# Patient Record
Sex: FEMALE | Race: WHITE | Employment: OTHER | URBAN - METROPOLITAN AREA
[De-identification: names, ages, dates, MRNs, and addresses within clinical notes are randomized per-mention and may not be internally consistent; named-entity substitution may affect disease eponyms.]

---

## 2018-07-14 ENCOUNTER — HOSPITAL ENCOUNTER (EMERGENCY)
Age: 43
Discharge: HOME | End: 2018-07-14
Payer: MEDICAID

## 2018-07-14 VITALS
WEIGHT: 238 LBS | BODY MASS INDEX: 42.17 KG/M2 | RESPIRATION RATE: 19 BRPM | HEIGHT: 63 IN | OXYGEN SATURATION: 100 % | DIASTOLIC BLOOD PRESSURE: 91 MMHG | SYSTOLIC BLOOD PRESSURE: 151 MMHG | TEMPERATURE: 98.6 F | HEART RATE: 100 BPM

## 2018-07-14 DIAGNOSIS — M54.12 CERVICAL RADICULOPATHY: ICD-10-CM

## 2018-07-14 DIAGNOSIS — G89.29 CHRONIC NECK PAIN: Primary | ICD-10-CM

## 2018-07-14 DIAGNOSIS — M54.2 CHRONIC NECK PAIN: Primary | ICD-10-CM

## 2018-07-14 LAB
ALBUMIN SERPL-MCNC: 4 G/DL (ref 3.5–5.2)
ALP BLD-CCNC: 83 U/L (ref 35–104)
ALT SERPL-CCNC: 19 U/L (ref 0–32)
ANION GAP SERPL CALCULATED.3IONS-SCNC: 11 MMOL/L (ref 7–16)
AST SERPL-CCNC: 17 U/L (ref 0–31)
BASOPHILS ABSOLUTE: 0.03 E9/L (ref 0–0.2)
BASOPHILS RELATIVE PERCENT: 0.5 % (ref 0–2)
BILIRUB SERPL-MCNC: <0.2 MG/DL (ref 0–1.2)
BILIRUBIN URINE: NEGATIVE
BLOOD, URINE: NEGATIVE
BUN BLDV-MCNC: 11 MG/DL (ref 6–20)
CALCIUM SERPL-MCNC: 9.2 MG/DL (ref 8.6–10.2)
CHLORIDE BLD-SCNC: 97 MMOL/L (ref 98–107)
CLARITY: CLEAR
CO2: 28 MMOL/L (ref 22–29)
COLOR: YELLOW
CREAT SERPL-MCNC: 0.7 MG/DL (ref 0.5–1)
EOSINOPHILS ABSOLUTE: 0.07 E9/L (ref 0.05–0.5)
EOSINOPHILS RELATIVE PERCENT: 1.2 % (ref 0–6)
GFR AFRICAN AMERICAN: >60
GFR NON-AFRICAN AMERICAN: >60 ML/MIN/1.73
GLUCOSE BLD-MCNC: 186 MG/DL (ref 74–109)
GLUCOSE URINE: NEGATIVE MG/DL
HCT VFR BLD CALC: 38.6 % (ref 34–48)
HEMOGLOBIN: 12.7 G/DL (ref 11.5–15.5)
IMMATURE GRANULOCYTES #: 0.01 E9/L
IMMATURE GRANULOCYTES %: 0.2 % (ref 0–5)
KETONES, URINE: NEGATIVE MG/DL
LACTIC ACID: 2 MMOL/L (ref 0.5–2.2)
LEUKOCYTE ESTERASE, URINE: NEGATIVE
LYMPHOCYTES ABSOLUTE: 1.94 E9/L (ref 1.5–4)
LYMPHOCYTES RELATIVE PERCENT: 34.4 % (ref 20–42)
MCH RBC QN AUTO: 28.5 PG (ref 26–35)
MCHC RBC AUTO-ENTMCNC: 32.9 % (ref 32–34.5)
MCV RBC AUTO: 86.5 FL (ref 80–99.9)
MONOCYTES ABSOLUTE: 0.41 E9/L (ref 0.1–0.95)
MONOCYTES RELATIVE PERCENT: 7.3 % (ref 2–12)
NEUTROPHILS ABSOLUTE: 3.18 E9/L (ref 1.8–7.3)
NEUTROPHILS RELATIVE PERCENT: 56.4 % (ref 43–80)
NITRITE, URINE: NEGATIVE
PDW BLD-RTO: 14.6 FL (ref 11.5–15)
PH UA: 7.5 (ref 5–9)
PLATELET # BLD: 358 E9/L (ref 130–450)
PMV BLD AUTO: 9.8 FL (ref 7–12)
POTASSIUM SERPL-SCNC: 4.5 MMOL/L (ref 3.5–5)
PROTEIN UA: NEGATIVE MG/DL
RBC # BLD: 4.46 E12/L (ref 3.5–5.5)
SODIUM BLD-SCNC: 136 MMOL/L (ref 132–146)
SPECIFIC GRAVITY UA: 1.01 (ref 1–1.03)
TOTAL PROTEIN: 7.2 G/DL (ref 6.4–8.3)
UROBILINOGEN, URINE: 0.2 E.U./DL
WBC # BLD: 5.6 E9/L (ref 4.5–11.5)

## 2018-07-14 PROCEDURE — 96375 TX/PRO/DX INJ NEW DRUG ADDON: CPT

## 2018-07-14 PROCEDURE — 96374 THER/PROPH/DIAG INJ IV PUSH: CPT

## 2018-07-14 PROCEDURE — 81003 URINALYSIS AUTO W/O SCOPE: CPT

## 2018-07-14 PROCEDURE — 80053 COMPREHEN METABOLIC PANEL: CPT

## 2018-07-14 PROCEDURE — 85025 COMPLETE CBC W/AUTO DIFF WBC: CPT

## 2018-07-14 PROCEDURE — 99283 EMERGENCY DEPT VISIT LOW MDM: CPT

## 2018-07-14 PROCEDURE — 96372 THER/PROPH/DIAG INJ SC/IM: CPT

## 2018-07-14 PROCEDURE — 2580000003 HC RX 258: Performed by: PHYSICIAN ASSISTANT

## 2018-07-14 PROCEDURE — 6360000002 HC RX W HCPCS: Performed by: PHYSICIAN ASSISTANT

## 2018-07-14 PROCEDURE — 36415 COLL VENOUS BLD VENIPUNCTURE: CPT

## 2018-07-14 PROCEDURE — 6360000002 HC RX W HCPCS: Performed by: NURSE PRACTITIONER

## 2018-07-14 PROCEDURE — 83605 ASSAY OF LACTIC ACID: CPT

## 2018-07-14 RX ORDER — OXYCODONE HYDROCHLORIDE 5 MG/1
5 TABLET ORAL EVERY 6 HOURS PRN
Qty: 20 TABLET | Refills: 0 | Status: SHIPPED | OUTPATIENT
Start: 2018-07-14 | End: 2018-07-19

## 2018-07-14 RX ORDER — 0.9 % SODIUM CHLORIDE 0.9 %
1000 INTRAVENOUS SOLUTION INTRAVENOUS ONCE
Status: COMPLETED | OUTPATIENT
Start: 2018-07-14 | End: 2018-07-14

## 2018-07-14 RX ORDER — ONDANSETRON 2 MG/ML
4 INJECTION INTRAMUSCULAR; INTRAVENOUS ONCE
Status: COMPLETED | OUTPATIENT
Start: 2018-07-14 | End: 2018-07-14

## 2018-07-14 RX ORDER — ORPHENADRINE CITRATE 30 MG/ML
60 INJECTION INTRAMUSCULAR; INTRAVENOUS ONCE
Status: COMPLETED | OUTPATIENT
Start: 2018-07-14 | End: 2018-07-14

## 2018-07-14 RX ORDER — MORPHINE SULFATE 4 MG/ML
6 INJECTION, SOLUTION INTRAMUSCULAR; INTRAVENOUS ONCE
Status: COMPLETED | OUTPATIENT
Start: 2018-07-14 | End: 2018-07-14

## 2018-07-14 RX ORDER — KETOROLAC TROMETHAMINE 30 MG/ML
60 INJECTION, SOLUTION INTRAMUSCULAR; INTRAVENOUS ONCE
Status: COMPLETED | OUTPATIENT
Start: 2018-07-14 | End: 2018-07-14

## 2018-07-14 RX ADMIN — SODIUM CHLORIDE 1000 ML: 9 INJECTION, SOLUTION INTRAVENOUS at 17:38

## 2018-07-14 RX ADMIN — MORPHINE SULFATE 6 MG: 4 INJECTION, SOLUTION INTRAMUSCULAR; INTRAVENOUS at 17:37

## 2018-07-14 RX ADMIN — ORPHENADRINE CITRATE 60 MG: 30 INJECTION, SOLUTION INTRAMUSCULAR; INTRAVENOUS at 17:38

## 2018-07-14 RX ADMIN — ONDANSETRON 4 MG: 2 INJECTION INTRAMUSCULAR; INTRAVENOUS at 17:37

## 2018-07-14 RX ADMIN — KETOROLAC TROMETHAMINE 60 MG: 30 INJECTION, SOLUTION INTRAMUSCULAR at 14:57

## 2018-07-14 ASSESSMENT — PAIN DESCRIPTION - ONSET: ONSET: ON-GOING

## 2018-07-14 ASSESSMENT — PAIN SCALES - GENERAL
PAINLEVEL_OUTOF10: 9
PAINLEVEL_OUTOF10: 7
PAINLEVEL_OUTOF10: 10

## 2018-07-14 ASSESSMENT — PAIN DESCRIPTION - FREQUENCY: FREQUENCY: CONTINUOUS

## 2018-07-14 ASSESSMENT — PAIN DESCRIPTION - ORIENTATION: ORIENTATION: POSTERIOR

## 2018-07-14 ASSESSMENT — PAIN DESCRIPTION - LOCATION: LOCATION: NECK

## 2018-07-14 ASSESSMENT — PAIN DESCRIPTION - DESCRIPTORS: DESCRIPTORS: SHARP

## 2018-07-14 ASSESSMENT — PAIN DESCRIPTION - PAIN TYPE: TYPE: CHRONIC PAIN

## 2018-07-14 NOTE — ED PROVIDER NOTES
[facility]     Department of Emergency Medicine   ED  Provider Note  Admit Date/Time: 7/14/2018  3:20 PM  ED Bed: 39/39   MRN: [id number]  Chief Complaint:   Neck Pain (started in june, has been hospitalizied twice, is not on any pain meds, does not see neuro until the end of the month)       History of Present Illness      Darryl Vidal is a 38 y.o. female who presents to the ED for Neck pain that she states is chronic. [facility] for this not too long ago. She states she is currently taking Robaxin and gabapentin. She reports numbness and tingling in her right arm. She has a past medical history of fibromyalgia, diabetes, scleroderma, and immune deficiency disorder. Patient states she does have an appointment with Dr. Baljit Gentile on the 27th. She was concerned as today her neck pain has began to worsen and the medications she is taking is no longer working. She denies any vision changes, headache, dizziness, chest pain, shortness breath, abdominal pain, nausea, vomiting, diarrhea, limb swelling, fever/chills, or recent trauma/injury. Patient has her MRI results from last week at [facility] clinic that state she has cervical stenosis and herniated disks at C5-6. Patient is alert and oriented x3 and in no apparent distress at this exam.     Robaxin 750mg q8 hours   Gabapentin 100mg BID    PCP: [facility]- Enmanuel Garay   Pain management: [place]- Dr. Omid HUSTON   Pertinent positives and negatives are stated within HPI, all other systems reviewed and are negative.     Past Medical History:   Past Medical History:   Diagnosis Date    Asthma     CPAP (continuous positive airway pressure) dependence     Depression     Diabetes mellitus (HCC)     Fibromyalgia     Headache     Hematuria     Hypertension     Immune deficiency disorder (HCC)     Rectal bleeding     Scleroderma ([facility] [address]     Thyroid disease      Past Surgical History:   Past Surgical History:   Procedure Laterality radiology results have been personally reviewed by myself)  Labs:  Results for orders placed or performed during the hospital encounter of 07/14/18   CBC Auto Differential   Result Value Ref Range    WBC 5.6 4.5 - 11.5 E9/L    RBC 4.46 3.50 - 5.50 E12/L    Hemoglobin 12.7 11.5 - 15.5 g/dL    Hematocrit 38.6 34.0 - 48.0 %    MCV 86.5 80.0 - 99.9 fL    MCH 28.5 26.0 - 35.0 pg    MCHC 32.9 32.0 - 34.5 %    RDW 14.6 11.5 - 15.0 fL    Platelets 855 263 - 491 E9/L    MPV 9.8 7.0 - 12.0 fL    Neutrophils % 56.4 43.0 - 80.0 %    Immature Granulocytes % 0.2 0.0 - 5.0 %    Lymphocytes % 34.4 20.0 - 42.0 %    Monocytes % 7.3 2.0 - 12.0 %    Eosinophils % 1.2 0.0 - 6.0 %    Basophils % 0.5 0.0 - 2.0 %    Neutrophils # 3.18 1.80 - 7.30 E9/L    Immature Granulocytes # 0.01 E9/L    Lymphocytes # 1.94 1.50 - 4.00 E9/L    Monocytes # 0.41 0.10 - 0.95 E9/L    Eosinophils # 0.07 0.05 - 0.50 E9/L    Basophils # 0.03 0.00 - 0.20 E9/L   Comprehensive Metabolic Panel   Result Value Ref Range    Sodium 136 132 - 146 mmol/L    Potassium 4.5 3.5 - 5.0 mmol/L    Chloride 97 (L) 98 - 107 mmol/L    CO2 28 22 - 29 mmol/L    Anion Gap 11 7 - 16 mmol/L    Glucose 186 (H) 74 - 109 mg/dL    BUN 11 6 - 20 mg/dL    CREATININE 0.7 0.5 - 1.0 mg/dL    GFR Non-African American >60 >=60 mL/min/1.73    GFR African American >60     Calcium 9.2 8.6 - 10.2 mg/dL    Total Protein 7.2 6.4 - 8.3 g/dL    Alb 4.0 3.5 - 5.2 g/dL    Total Bilirubin <0.2 0.0 - 1.2 mg/dL    Alkaline Phosphatase 83 35 - 104 U/L    ALT 19 0 - 32 U/L    AST 17 0 - 31 U/L   Lactic Acid, Plasma   Result Value Ref Range    Lactic Acid 2.0 0.5 - 2.2 mmol/L   Urinalysis   Result Value Ref Range    Color, UA Yellow Straw/Yellow    Clarity, UA Clear Clear    Glucose, Ur Negative Negative mg/dL    Bilirubin Urine Negative Negative    Ketones, Urine Negative Negative mg/dL    Specific Gravity, UA 1.015 1.005 - 1.030    Blood, Urine Negative Negative    pH, UA 7.5 5.0 - 9.0    Protein, UA Negative physicial examination, multiple bedside re-evaluations and IV medications  This patient has remained hemodynamically stable during their ED course. Plan   Discharge to home. Patient condition is good. New Medications     Discharge Medication List as of 7/14/2018  6:06 PM      START taking these medications    Details   diclofenac sodium (VOLTAREN) 1 % GEL Apply 4x daily as needed, Disp-100 g, R-0, Print      oxyCODONE (ROXICODONE) 5 MG immediate release tablet Take 1 tablet by mouth every 6 hours as needed for Pain for up to 5 days. Intended supply: 5 days. Take lowest dose possible to manage pain., Disp-20 tablet, R-0Print             Electronically signed by Arnel Mallory PA-C   DD: 7/14/18  **This report was transcribed using voice recognition software. Every effort was made to ensure accuracy; however, inadvertent computerized transcription errors may be present.     END OF PROVIDER NOTE       Arnel Mallory PA-C  07/15/18 [id number]

## 2018-07-16 ENCOUNTER — OFFICE VISIT (OUTPATIENT)
Dept: NEUROSURGERY | Age: 43
End: 2018-07-16
Payer: MEDICAID

## 2018-07-16 ENCOUNTER — HOSPITAL ENCOUNTER (EMERGENCY)
Age: 43
Discharge: HOME | End: 2018-07-16
Payer: MEDICAID

## 2018-07-16 VITALS
SYSTOLIC BLOOD PRESSURE: 123 MMHG | HEIGHT: 63 IN | DIASTOLIC BLOOD PRESSURE: 78 MMHG | WEIGHT: 242 LBS | BODY MASS INDEX: 42.88 KG/M2 | HEART RATE: 98 BPM

## 2018-07-16 VITALS
BODY MASS INDEX: 42.17 KG/M2 | RESPIRATION RATE: 16 BRPM | TEMPERATURE: 96.5 F | DIASTOLIC BLOOD PRESSURE: 90 MMHG | HEART RATE: 92 BPM | HEIGHT: 63 IN | SYSTOLIC BLOOD PRESSURE: 145 MMHG | OXYGEN SATURATION: 96 % | WEIGHT: 238 LBS

## 2018-07-16 DIAGNOSIS — M54.12 CERVICAL RADICULOPATHY: Primary | ICD-10-CM

## 2018-07-16 DIAGNOSIS — M54.2 MUSCULOSKELETAL NECK PAIN: Primary | ICD-10-CM

## 2018-07-16 PROCEDURE — 96372 THER/PROPH/DIAG INJ SC/IM: CPT

## 2018-07-16 PROCEDURE — 6360000002 HC RX W HCPCS: Performed by: NURSE PRACTITIONER

## 2018-07-16 PROCEDURE — 99282 EMERGENCY DEPT VISIT SF MDM: CPT

## 2018-07-16 PROCEDURE — 99244 OFF/OP CNSLTJ NEW/EST MOD 40: CPT | Performed by: NEUROLOGICAL SURGERY

## 2018-07-16 RX ORDER — ONDANSETRON 4 MG/1
4 TABLET, ORALLY DISINTEGRATING ORAL ONCE
Status: COMPLETED | OUTPATIENT
Start: 2018-07-16 | End: 2018-07-16

## 2018-07-16 RX ORDER — FUROSEMIDE 20 MG/1
20 TABLET ORAL DAILY
COMMUNITY
End: 2020-10-05 | Stop reason: SDUPTHER

## 2018-07-16 RX ORDER — CALCIUM CARBONATE 500(1250)
500 TABLET ORAL DAILY
COMMUNITY
End: 2020-08-27

## 2018-07-16 RX ORDER — CEVIMELINE HYDROCHLORIDE 30 MG/1
30 CAPSULE ORAL 2 TIMES DAILY
COMMUNITY
Start: 2017-02-01 | End: 2020-08-11

## 2018-07-16 RX ORDER — INSULIN GLARGINE 100 [IU]/ML
0-6 INJECTION, SOLUTION SUBCUTANEOUS 2 TIMES DAILY
COMMUNITY
End: 2020-10-15

## 2018-07-16 RX ORDER — FOLIC ACID 1 MG/1
1 TABLET ORAL
COMMUNITY
End: 2020-09-01 | Stop reason: SDUPTHER

## 2018-07-16 RX ORDER — MONTELUKAST SODIUM 10 MG/1
10 TABLET ORAL NIGHTLY
COMMUNITY

## 2018-07-16 RX ORDER — SCOLOPAMINE TRANSDERMAL SYSTEM 1 MG/1
1 PATCH, EXTENDED RELEASE TRANSDERMAL
COMMUNITY
End: 2020-08-11

## 2018-07-16 RX ORDER — METOPROLOL TARTRATE 100 MG/1
50 TABLET ORAL 2 TIMES DAILY
COMMUNITY
End: 2020-08-11

## 2018-07-16 RX ORDER — ERGOCALCIFEROL 1.25 MG/1
50000 CAPSULE ORAL WEEKLY
COMMUNITY
End: 2020-08-27

## 2018-07-16 RX ORDER — DIVALPROEX SODIUM 250 MG/1
500 TABLET, DELAYED RELEASE ORAL SEE ADMIN INSTRUCTIONS
COMMUNITY
Start: 2017-04-16 | End: 2021-01-26 | Stop reason: ALTCHOICE

## 2018-07-16 RX ORDER — IPRATROPIUM BROMIDE AND ALBUTEROL SULFATE 2.5; .5 MG/3ML; MG/3ML
1 SOLUTION RESPIRATORY (INHALATION) EVERY 4 HOURS PRN
COMMUNITY

## 2018-07-16 RX ORDER — GABAPENTIN 100 MG/1
100 CAPSULE ORAL 2 TIMES DAILY
COMMUNITY
End: 2020-08-11

## 2018-07-16 RX ORDER — EPINEPHRINE 0.3 MG/.3ML
0.3 INJECTION SUBCUTANEOUS PRN
COMMUNITY
End: 2020-08-27

## 2018-07-16 RX ORDER — MAGNESIUM OXIDE 400 MG/1
400 TABLET ORAL DAILY
COMMUNITY
End: 2020-08-27

## 2018-07-16 RX ORDER — LEVOTHYROXINE SODIUM 0.05 MG/1
125 TABLET ORAL
COMMUNITY
End: 2020-09-01 | Stop reason: SDUPTHER

## 2018-07-16 RX ORDER — RANITIDINE 300 MG/1
300 CAPSULE ORAL EVERY EVENING
COMMUNITY
End: 2020-08-11

## 2018-07-16 RX ORDER — ORPHENADRINE CITRATE 30 MG/ML
60 INJECTION INTRAMUSCULAR; INTRAVENOUS ONCE
Status: COMPLETED | OUTPATIENT
Start: 2018-07-16 | End: 2018-07-16

## 2018-07-16 RX ORDER — KETOROLAC TROMETHAMINE 30 MG/ML
60 INJECTION, SOLUTION INTRAMUSCULAR; INTRAVENOUS ONCE
Status: COMPLETED | OUTPATIENT
Start: 2018-07-16 | End: 2018-07-16

## 2018-07-16 RX ORDER — DEXLANSOPRAZOLE 60 MG/1
60 CAPSULE, DELAYED RELEASE ORAL 2 TIMES DAILY
COMMUNITY
Start: 2016-07-27 | End: 2020-08-11

## 2018-07-16 RX ORDER — LANOLIN ALCOHOL/MO/W.PET/CERES
5 CREAM (GRAM) TOPICAL NIGHTLY PRN
COMMUNITY
End: 2020-08-27

## 2018-07-16 RX ORDER — METHOCARBAMOL 750 MG/1
750 TABLET, FILM COATED ORAL 3 TIMES DAILY
COMMUNITY
End: 2020-08-27

## 2018-07-16 RX ORDER — ALBUTEROL SULFATE 90 UG/1
2 AEROSOL, METERED RESPIRATORY (INHALATION) EVERY 6 HOURS PRN
COMMUNITY
End: 2020-08-27

## 2018-07-16 RX ORDER — POLYETHYLENE GLYCOL 3350 17 G/17G
17 POWDER, FOR SOLUTION ORAL 2 TIMES DAILY PRN
COMMUNITY
End: 2020-10-05 | Stop reason: SDUPTHER

## 2018-07-16 RX ORDER — LORATADINE 10 MG/1
10 CAPSULE, LIQUID FILLED ORAL DAILY
COMMUNITY
End: 2020-08-11

## 2018-07-16 RX ORDER — DOCUSATE SODIUM 100 MG/1
100 CAPSULE, LIQUID FILLED ORAL 2 TIMES DAILY
COMMUNITY
End: 2020-08-11

## 2018-07-16 RX ORDER — FLUTICASONE PROPIONATE 50 MCG
1 SPRAY, SUSPENSION (ML) NASAL 2 TIMES DAILY
COMMUNITY

## 2018-07-16 RX ORDER — ONDANSETRON 8 MG/1
8 TABLET, ORALLY DISINTEGRATING ORAL EVERY 8 HOURS PRN
COMMUNITY
End: 2022-07-19

## 2018-07-16 RX ORDER — MORPHINE SULFATE 4 MG/ML
8 INJECTION, SOLUTION INTRAMUSCULAR; INTRAVENOUS ONCE
Status: COMPLETED | OUTPATIENT
Start: 2018-07-16 | End: 2018-07-16

## 2018-07-16 RX ORDER — SPIRONOLACTONE 50 MG/1
50 TABLET, FILM COATED ORAL DAILY
COMMUNITY
End: 2020-08-27

## 2018-07-16 RX ORDER — METFORMIN HYDROCHLORIDE 500 MG/1
500 TABLET, EXTENDED RELEASE ORAL 2 TIMES DAILY
COMMUNITY
End: 2020-08-11

## 2018-07-16 RX ORDER — CLONAZEPAM 0.5 MG/1
0.5 TABLET ORAL 2 TIMES DAILY
COMMUNITY

## 2018-07-16 RX ORDER — APREPITANT 80 MG/1
80 CAPSULE ORAL DAILY
COMMUNITY
End: 2020-08-27

## 2018-07-16 RX ORDER — ROPINIROLE 0.5 MG/1
0.5 TABLET, FILM COATED ORAL SEE ADMIN INSTRUCTIONS
COMMUNITY
End: 2020-09-01 | Stop reason: SDUPTHER

## 2018-07-16 RX ADMIN — MORPHINE SULFATE 8 MG: 4 INJECTION INTRAVENOUS at 01:02

## 2018-07-16 RX ADMIN — KETOROLAC TROMETHAMINE 60 MG: 30 INJECTION, SOLUTION INTRAMUSCULAR at 01:02

## 2018-07-16 RX ADMIN — ORPHENADRINE CITRATE 60 MG: 30 INJECTION INTRAMUSCULAR; INTRAVENOUS at 01:02

## 2018-07-16 RX ADMIN — ONDANSETRON 4 MG: 4 TABLET, ORALLY DISINTEGRATING ORAL at 01:02

## 2018-07-16 ASSESSMENT — ENCOUNTER SYMPTOMS
NAUSEA: 0
PHOTOPHOBIA: 0
SHORTNESS OF BREATH: 0
STRIDOR: 0
COUGH: 0
BLURRED VISION: 0
VOMITING: 0
BACK PAIN: 1

## 2018-07-16 ASSESSMENT — PAIN DESCRIPTION - LOCATION: LOCATION: NECK

## 2018-07-16 ASSESSMENT — PAIN DESCRIPTION - DESCRIPTORS: DESCRIPTORS: SHOOTING;BURNING

## 2018-07-16 ASSESSMENT — PAIN SCALES - GENERAL
PAINLEVEL_OUTOF10: 6
PAINLEVEL_OUTOF10: 6

## 2018-07-16 ASSESSMENT — PAIN DESCRIPTION - PAIN TYPE: TYPE: CHRONIC PAIN

## 2018-07-16 NOTE — PROGRESS NOTES
(continuous positive airway pressure) dependence     Depression     Diabetes mellitus (HCC)     Fibromyalgia     Headache     Hematuria     Hypertension     Immune deficiency disorder (HCC)     Rectal bleeding     Scleroderma ([facility] [address]     Thyroid disease      Past Surgical History:   Procedure Laterality Date    APPENDECTOMY      CHOLECYSTECTOMY      HYSTERECTOMY        History reviewed. No pertinent family history. Social History     Social History    Marital status: Unknown     Spouse name: N/A    Number of children: N/A    Years of education: N/A     Occupational History    Not on file. Social History Main Topics    Smoking status: Never Smoker    Smokeless tobacco: Never Used    Alcohol use No    Drug use: No    Sexual activity: Not on file     Other Topics Concern    Not on file     Social History Narrative    No narrative on file       Medications:   Current Outpatient Prescriptions   Medication Sig Dispense Refill    cevimeline (EVOXAC) 30 MG capsule Take 30 mg by mouth 2 times daily      clonazePAM (KLONOPIN) 0.5 MG tablet Take 0.5 mg by mouth 2 times daily. Ermias Sue dexlansoprazole (DEXILANT) 60 MG CPDR delayed release capsule Take 60 mg by mouth 2 times daily      albuterol sulfate  (90 Base) MCG/ACT inhaler Inhale 2 puffs into the lungs every 6 hours as needed for Wheezing      aprepitant (EMEND) 80 MG capsule Take 80 mg by mouth daily      calcium carbonate (OSCAL) 500 MG TABS tablet Take 500 mg by mouth daily      CPAP Machine MISC by Does not apply route      docusate sodium (COLACE) 100 MG capsule Take 100 mg by mouth 2 times daily      econazole nitrate 1 % cream Apply topically daily Apply topically daily.       EPINEPHrine (EPIPEN) 0.3 MG/0.3ML SOAJ injection Inject 0.3 mg into the muscle as needed Use as directed for allergic reaction      vitamin D (ERGOCALCIFEROL) 31977 units CAPS capsule Take 50,000 Units by mouth once a week      Ferrous Gluconate Respiratory: Negative for cough, shortness of breath and stridor. Cardiovascular: Negative for chest pain and palpitations. Gastrointestinal: Negative for nausea and vomiting. Genitourinary: Negative for flank pain. Musculoskeletal: Positive for back pain and neck pain. Negative for falls and myalgias. Skin: Negative for itching and rash. Neurological: Positive for focal weakness. Negative for dizziness, tremors and speech change. Endo/Heme/Allergies: Does not bruise/bleed easily. Psychiatric/Behavioral: Negative for memory loss. The patient does not have insomnia. Physical Exam   Constitutional: She appears well-nourished. HENT:   Head: Normocephalic and atraumatic. Eyes: EOM are normal. Pupils are equal, round, and reactive to light. Neck: No tracheal deviation present. Cardiovascular: Normal rate and regular rhythm. Pulmonary/Chest: Breath sounds normal. No stridor. Abdominal: She exhibits no distension. Neurological: She is alert. She has normal reflexes. CN 3-12 intact  Right sided strength 4/5, left sided strength full  There is giveaway weakness on the right side  Sensation intact to light touch    Erika sign negative  No clonus   Skin: Skin is warm and dry. Psychiatric: Thought content normal.        /78 (Site: Left Arm, Position: Sitting)   Pulse 98   Ht 5' 3\" (1.6 m)   Wt 242 lb (109.8 kg)   BMI 42.87 kg/m²        Assessment / Plan:   Musculoskeletal neck pain  Migraines    Hansa Hayes is 38years old. She presents with neck pain and back pain. Her neck pain is mainly musculoskeletal in nature. She has mild degenerative changes in the cervical spine and lumbar spine. There is no severe central canal or neuroforaminal narrowing. She does not have any signs of cervical myelopathy. She has a mild disc bulge at C4-5 and at C5-6. She also has a small right L4-5 paracentral disc herniation. No current surgical intervention is planned.   She is going to proceed with conservative treatment with the pain clinic. Thank you for involving me in the care of Cade Climsolitario.     Michelle Tabares MD   Pager: [phone]

## 2018-07-16 NOTE — ED PROVIDER NOTES
[azithromycin]    -------------------------------------------------- RESULTS -------------------------------------------------  All laboratory and radiology results have been personally reviewed by myself   LABS:  No results found for this visit on 07/16/18. RADIOLOGY:  Interpreted by Radiologist.  No orders to display       ------------------------- NURSING NOTES AND VITALS REVIEWED ---------------------------   The nursing notes within the ED encounter and vital signs as below have been reviewed. BP (!) 145/90   Pulse 92   Temp 96.5 °F (35.8 °C)   Resp 16   Ht 5' 3\" (1.6 m)   Wt 238 lb (108 kg)   SpO2 96%   BMI 42.16 kg/m²   Oxygen Saturation Interpretation: Normal      ---------------------------------------------------PHYSICAL EXAM--------------------------------------      Constitutional/General: Alert and oriented x3, well appearing, non toxic in NAD  Head: NC/AT  Eyes: PERRL, EOMI  Mouth: Oropharynx clear, handling secretions, no trismus  Neck: Supple, full ROM, no meningeal signs  Pulmonary: Lungs clear to auscultation bilaterally, no wheezes, rales, or rhonchi. Not in respiratory distress  Cardiovascular:  Regular rate and rhythm, no murmurs, gallops, or rubs. 2+ distal pulses  Abdomen: Soft, non tender, non distended,   Extremities: Moves all extremities x 4.  Warm and well perfused  Skin: warm and dry without rash  Neurologic: GCS 15,  Psych: Normal Affect      ------------------------------ ED COURSE/MEDICAL DECISION MAKING----------------------  Medications   ketorolac (TORADOL) injection 60 mg (60 mg Intramuscular Given 7/16/18 0102)   orphenadrine (NORFLEX) injection 60 mg (60 mg Intramuscular Given 7/16/18 0102)   ondansetron (ZOFRAN-ODT) disintegrating tablet 4 mg (4 mg Oral Given 7/16/18 0102)   morphine (PF) injection 8 mg (8 mg Intramuscular Given 7/16/18 0102)         Medical Decision Making:    Patient was provided with pain medication in the department and advised to call

## 2018-07-18 DIAGNOSIS — M54.40 LOW BACK PAIN WITH SCIATICA, SCIATICA LATERALITY UNSPECIFIED, UNSPECIFIED BACK PAIN LATERALITY, UNSPECIFIED CHRONICITY: ICD-10-CM

## 2018-07-18 DIAGNOSIS — M54.2 NECK PAIN: Primary | ICD-10-CM

## 2018-07-19 ENCOUNTER — HOSPITAL ENCOUNTER (EMERGENCY)
Age: 43
Discharge: HOME | End: 2018-07-20
Payer: MEDICAID

## 2018-07-19 VITALS
RESPIRATION RATE: 16 BRPM | DIASTOLIC BLOOD PRESSURE: 92 MMHG | HEIGHT: 63 IN | HEART RATE: 98 BPM | TEMPERATURE: 96.8 F | SYSTOLIC BLOOD PRESSURE: 127 MMHG | BODY MASS INDEX: 42.52 KG/M2 | WEIGHT: 240 LBS | OXYGEN SATURATION: 100 %

## 2018-07-19 DIAGNOSIS — G43.001 MIGRAINE WITHOUT AURA AND WITH STATUS MIGRAINOSUS, NOT INTRACTABLE: ICD-10-CM

## 2018-07-19 DIAGNOSIS — R21 RASH AND OTHER NONSPECIFIC SKIN ERUPTION: Primary | ICD-10-CM

## 2018-07-19 PROCEDURE — 6360000002 HC RX W HCPCS: Performed by: STUDENT IN AN ORGANIZED HEALTH CARE EDUCATION/TRAINING PROGRAM

## 2018-07-19 PROCEDURE — 96372 THER/PROPH/DIAG INJ SC/IM: CPT

## 2018-07-19 PROCEDURE — 99282 EMERGENCY DEPT VISIT SF MDM: CPT

## 2018-07-19 RX ORDER — DIPHENHYDRAMINE HYDROCHLORIDE 50 MG/ML
25 INJECTION INTRAMUSCULAR; INTRAVENOUS ONCE
Status: COMPLETED | OUTPATIENT
Start: 2018-07-19 | End: 2018-07-19

## 2018-07-19 RX ORDER — KETOROLAC TROMETHAMINE 30 MG/ML
15 INJECTION, SOLUTION INTRAMUSCULAR; INTRAVENOUS ONCE
Status: COMPLETED | OUTPATIENT
Start: 2018-07-19 | End: 2018-07-19

## 2018-07-19 RX ORDER — HYDROXYZINE HYDROCHLORIDE 50 MG/ML
50 INJECTION, SOLUTION INTRAMUSCULAR ONCE
Status: COMPLETED | OUTPATIENT
Start: 2018-07-19 | End: 2018-07-19

## 2018-07-19 RX ORDER — PROMETHAZINE HYDROCHLORIDE 25 MG/ML
12.5 INJECTION, SOLUTION INTRAMUSCULAR; INTRAVENOUS ONCE
Status: COMPLETED | OUTPATIENT
Start: 2018-07-19 | End: 2018-07-19

## 2018-07-19 RX ADMIN — HYDROXYZINE HYDROCHLORIDE 50 MG: 50 INJECTION, SOLUTION INTRAMUSCULAR at 23:40

## 2018-07-19 RX ADMIN — DIPHENHYDRAMINE HYDROCHLORIDE 25 MG: 50 INJECTION, SOLUTION INTRAMUSCULAR; INTRAVENOUS at 23:40

## 2018-07-19 RX ADMIN — KETOROLAC TROMETHAMINE 15 MG: 30 INJECTION, SOLUTION INTRAMUSCULAR; INTRAVENOUS at 23:40

## 2018-07-19 RX ADMIN — PROMETHAZINE HYDROCHLORIDE 12.5 MG: 25 INJECTION INTRAMUSCULAR; INTRAVENOUS at 23:40

## 2018-07-19 ASSESSMENT — ENCOUNTER SYMPTOMS
SINUS PRESSURE: 0
SWOLLEN GLANDS: 0
SORE THROAT: 0
COUGH: 0
VISUAL CHANGE: 0
ABDOMINAL PAIN: 0
EYE PAIN: 0
BACK PAIN: 0
NAUSEA: 0
COLOR CHANGE: 0
TINGLING: 0
PHOTOPHOBIA: 0
DIARRHEA: 0
VOMITING: 0
BLURRED VISION: 0
SHORTNESS OF BREATH: 0

## 2018-07-19 ASSESSMENT — PAIN DESCRIPTION - DESCRIPTORS: DESCRIPTORS: BURNING;SHARP

## 2018-07-19 ASSESSMENT — PAIN DESCRIPTION - LOCATION: LOCATION: HEAD;ELBOW

## 2018-07-19 ASSESSMENT — PAIN SCALES - GENERAL: PAINLEVEL_OUTOF10: 6

## 2018-07-19 ASSESSMENT — PAIN DESCRIPTION - ORIENTATION: ORIENTATION: RIGHT

## 2018-07-20 RX ORDER — HYDROXYZINE PAMOATE 25 MG/1
25 CAPSULE ORAL 3 TIMES DAILY PRN
Qty: 21 CAPSULE | Refills: 0 | Status: SHIPPED | OUTPATIENT
Start: 2018-07-20 | End: 2018-07-27

## 2018-07-20 NOTE — ED PROVIDER NOTES
supple. No JVD present. No tracheal deviation present. No thyromegaly present. No meningismal signs  No rigidity of the neck   Cardiovascular: Normal rate, regular rhythm, normal heart sounds and intact distal pulses. Exam reveals no gallop and no friction rub. No murmur heard. Pulmonary/Chest: Effort normal and breath sounds normal. No stridor. No respiratory distress. She has no wheezes. She has no rales. She exhibits no tenderness. Abdominal: Soft. Bowel sounds are normal. She exhibits no distension and no mass. There is no tenderness. There is no rebound and no guarding. Musculoskeletal: Normal range of motion. She exhibits no edema, tenderness or deformity. Lymphadenopathy:     She has no cervical adenopathy. Neurological: She is alert and oriented to person, place, and time. Skin: Skin is warm and dry. Rash (Maculopapular rash that is itchy and mildly tender to palpation on the right proximal forearm and elbow light brown in color) noted. She is not diaphoretic. No erythema. No pallor. Psychiatric: She has a normal mood and affect. Her behavior is normal. Judgment and thought content normal.   Nursing note and vitals reviewed. Procedures    MDM  Patient presented with rash and headache. Rash present for about 2 weeks on arm, worsening. Was seen at [facility] ED for rash today and prescribed Triamcinolone cream, but patient didn't use at recommendation of her immunologist. Headache consistent with prior migraines, no trauma. Patient treated with Phenergan, Toradol, Benadryl, and Hydroxyzine IM. Symptoms resolved. Patient discharged home with PCP and immunology follow-up and hydroxyzine as needed for itching. Patient accompanied by her father who was her . ED Course as of Jul 20 0418 Fri Jul 20, 2018   0032 Patient's headache and itching resolved.   [LS]      ED Course User Index  [LS] Joan Marina DO       ED Course as of Jul 20 0422 Fri Jul 20, 2018   0032 Patient's (Temporal)   Resp 16   Ht 5' 3\" (1.6 m)   Wt 240 lb (108.9 kg)   SpO2 100%   BMI 42.51 kg/m²   Oxygen Saturation Interpretation: Normal      ------------------------------------------ PROGRESS NOTES ------------------------------------------  4:22 AM  I have spoken with the patient and discussed todays results, in addition to providing specific details for the plan of care and counseling regarding the diagnosis and prognosis. Their questions are answered at this time and they are agreeable with the plan. I discussed at length with them reasons for immediate return here for re evaluation. They will followup with their primary care physician by calling their office tomorrow. --------------------------------- ADDITIONAL PROVIDER NOTES ---------------------------------  At this time the patient is without objective evidence of an acute process requiring hospitalization or inpatient management. They have remained hemodynamically stable throughout their entire ED visit and are stable for discharge with outpatient follow-up. The plan has been discussed in detail and they are aware of the specific conditions for emergent return, as well as the importance of follow-up. Discharge Medication List as of 7/20/2018 12:31 AM      START taking these medications    Details   hydrOXYzine (VISTARIL) 25 MG capsule Take 1 capsule by mouth 3 times daily as needed for Itching, Disp-21 capsule, R-0Print             Diagnosis:  1. Rash and other nonspecific skin eruption    2. Migraine without aura and with status migrainosus, not intractable        Disposition:  Patient's disposition: Discharge to home  Patient's condition is stable.          Ayde Hahn DO  Resident  07/20/18 8185

## 2018-07-20 NOTE — ED NOTES
Pt states she is feeling better. Discharge instructions discussed.  Pt given number for family medicine clinic per request.      Alcides Mejía RN  07/20/18 0041

## 2018-07-25 ENCOUNTER — HOSPITAL ENCOUNTER (EMERGENCY)
Age: 43
Discharge: HOME | End: 2018-07-26
Payer: MEDICAID

## 2018-07-25 VITALS
WEIGHT: 234 LBS | BODY MASS INDEX: 41.46 KG/M2 | HEIGHT: 63 IN | OXYGEN SATURATION: 100 % | DIASTOLIC BLOOD PRESSURE: 95 MMHG | SYSTOLIC BLOOD PRESSURE: 126 MMHG | TEMPERATURE: 97.3 F | HEART RATE: 95 BPM | RESPIRATION RATE: 15 BRPM

## 2018-07-25 DIAGNOSIS — R11.2 NON-INTRACTABLE VOMITING WITH NAUSEA, UNSPECIFIED VOMITING TYPE: Primary | ICD-10-CM

## 2018-07-25 PROCEDURE — 99284 EMERGENCY DEPT VISIT MOD MDM: CPT

## 2018-07-25 RX ORDER — ONDANSETRON 2 MG/ML
8 INJECTION INTRAMUSCULAR; INTRAVENOUS ONCE
Status: COMPLETED | OUTPATIENT
Start: 2018-07-25 | End: 2018-07-26

## 2018-07-25 RX ORDER — 0.9 % SODIUM CHLORIDE 0.9 %
1000 INTRAVENOUS SOLUTION INTRAVENOUS ONCE
Status: COMPLETED | OUTPATIENT
Start: 2018-07-25 | End: 2018-07-26

## 2018-07-25 ASSESSMENT — PAIN SCALES - GENERAL: PAINLEVEL_OUTOF10: 2

## 2018-07-25 ASSESSMENT — PAIN DESCRIPTION - PAIN TYPE: TYPE: ACUTE PAIN

## 2018-07-25 ASSESSMENT — PAIN DESCRIPTION - DESCRIPTORS: DESCRIPTORS: DISCOMFORT

## 2018-07-25 ASSESSMENT — PAIN DESCRIPTION - LOCATION: LOCATION: ABDOMEN

## 2018-07-25 ASSESSMENT — PAIN DESCRIPTION - ORIENTATION: ORIENTATION: LOWER

## 2018-07-26 LAB
ANION GAP SERPL CALCULATED.3IONS-SCNC: 10 MMOL/L (ref 7–16)
BASOPHILS ABSOLUTE: 0.03 E9/L (ref 0–0.2)
BASOPHILS RELATIVE PERCENT: 0.6 % (ref 0–2)
BUN BLDV-MCNC: 11 MG/DL (ref 6–20)
CALCIUM SERPL-MCNC: 9 MG/DL (ref 8.6–10.2)
CHLORIDE BLD-SCNC: 99 MMOL/L (ref 98–107)
CO2: 25 MMOL/L (ref 22–29)
CREAT SERPL-MCNC: 0.7 MG/DL (ref 0.5–1)
EOSINOPHILS ABSOLUTE: 0.07 E9/L (ref 0.05–0.5)
EOSINOPHILS RELATIVE PERCENT: 1.3 % (ref 0–6)
GFR AFRICAN AMERICAN: >60
GFR NON-AFRICAN AMERICAN: >60 ML/MIN/1.73
GLUCOSE BLD-MCNC: 147 MG/DL (ref 74–109)
HCT VFR BLD CALC: 36.4 % (ref 34–48)
HEMOGLOBIN: 11.7 G/DL (ref 11.5–15.5)
IMMATURE GRANULOCYTES #: 0.02 E9/L
IMMATURE GRANULOCYTES %: 0.4 % (ref 0–5)
LYMPHOCYTES ABSOLUTE: 2.23 E9/L (ref 1.5–4)
LYMPHOCYTES RELATIVE PERCENT: 42.2 % (ref 20–42)
MCH RBC QN AUTO: 28 PG (ref 26–35)
MCHC RBC AUTO-ENTMCNC: 32.1 % (ref 32–34.5)
MCV RBC AUTO: 87.1 FL (ref 80–99.9)
MONOCYTES ABSOLUTE: 0.4 E9/L (ref 0.1–0.95)
MONOCYTES RELATIVE PERCENT: 7.6 % (ref 2–12)
NEUTROPHILS ABSOLUTE: 2.54 E9/L (ref 1.8–7.3)
NEUTROPHILS RELATIVE PERCENT: 47.9 % (ref 43–80)
PDW BLD-RTO: 14.5 FL (ref 11.5–15)
PLATELET # BLD: 310 E9/L (ref 130–450)
PMV BLD AUTO: 9.6 FL (ref 7–12)
POTASSIUM SERPL-SCNC: 4.3 MMOL/L (ref 3.5–5)
RBC # BLD: 4.18 E12/L (ref 3.5–5.5)
SODIUM BLD-SCNC: 134 MMOL/L (ref 132–146)
WBC # BLD: 5.3 E9/L (ref 4.5–11.5)

## 2018-07-26 PROCEDURE — 96375 TX/PRO/DX INJ NEW DRUG ADDON: CPT

## 2018-07-26 PROCEDURE — 6360000002 HC RX W HCPCS: Performed by: NURSE PRACTITIONER

## 2018-07-26 PROCEDURE — 85025 COMPLETE CBC W/AUTO DIFF WBC: CPT

## 2018-07-26 PROCEDURE — 2580000003 HC RX 258: Performed by: NURSE PRACTITIONER

## 2018-07-26 PROCEDURE — 80048 BASIC METABOLIC PNL TOTAL CA: CPT

## 2018-07-26 PROCEDURE — 96374 THER/PROPH/DIAG INJ IV PUSH: CPT

## 2018-07-26 PROCEDURE — 36415 COLL VENOUS BLD VENIPUNCTURE: CPT

## 2018-07-26 RX ORDER — DIPHENHYDRAMINE HYDROCHLORIDE 50 MG/ML
12.5 INJECTION INTRAMUSCULAR; INTRAVENOUS ONCE
Status: COMPLETED | OUTPATIENT
Start: 2018-07-26 | End: 2018-07-26

## 2018-07-26 RX ADMIN — DIPHENHYDRAMINE HYDROCHLORIDE 12.5 MG: 50 INJECTION, SOLUTION INTRAMUSCULAR; INTRAVENOUS at 01:31

## 2018-07-26 RX ADMIN — PROCHLORPERAZINE EDISYLATE 10 MG: 5 INJECTION INTRAMUSCULAR; INTRAVENOUS at 01:32

## 2018-07-26 RX ADMIN — SODIUM CHLORIDE 1000 ML: 9 INJECTION, SOLUTION INTRAVENOUS at 00:26

## 2018-07-26 RX ADMIN — ONDANSETRON 8 MG: 2 INJECTION INTRAMUSCULAR; INTRAVENOUS at 00:32

## 2018-07-26 ASSESSMENT — PAIN DESCRIPTION - ORIENTATION: ORIENTATION: MID

## 2018-07-26 ASSESSMENT — PAIN SCALES - GENERAL: PAINLEVEL_OUTOF10: 3

## 2018-07-26 ASSESSMENT — PAIN DESCRIPTION - PAIN TYPE: TYPE: ACUTE PAIN

## 2018-07-26 ASSESSMENT — PAIN DESCRIPTION - FREQUENCY: FREQUENCY: CONTINUOUS

## 2018-07-26 ASSESSMENT — PAIN DESCRIPTION - DESCRIPTORS: DESCRIPTORS: ACHING;DULL

## 2018-07-26 NOTE — ED NOTES
Pt denies pain since medicated, ambulates with steady gait with friend to parking lot. Denies nausea/vomiting since medicated.      Chuck Salmeron RN  07/26/18 7796

## 2018-07-26 NOTE — ED PROVIDER NOTES
[facility]dependent [facility]       Department of Emergency Medicine   ED  Provider Note  Admit Date/RoomTime: 7/25/2018 11:30 PM  ED Room: 23/23  Chief Complaint   Emesis (\"extreme nausea, every time I try to eat I throw it up\" started yesterday hx of gastroparesis but this feels different than that, denies any pain, zofran and phenergan)    History of Present Illness   Source of history provided by:  patient and spouse/SO. History/Exam Limitations: none. Delmi Metz is a 38 y.o. old female with a past medical history of   Past Medical History:   Diagnosis Date    Asthma     CPAP (continuous positive airway pressure) dependence     Depression     Diabetes mellitus (HCC)     Fibromyalgia     Headache     Hematuria     Hypertension     Immune deficiency disorder (HCC)     Rectal bleeding     Scleroderma (HCC)     Thyroid disease     presents to the emergency department by private vehicle, with complaints of intermittent episodes nausea and vomiting of undigested food which began 1 day(s) prior to arrival.  There has been similar episodes in the past with chronic gastroparesis. The symptoms are associated with none. The symptoms are aggravated by eating and liquids and relieved by none and nothing. There has been no additional symptoms of abdominal pain, diarrhea, fever, chills, sweats, headache, arthralgias, myalgias, irritability, URI symptoms, cough or dysuria. Patient states that she has a gastroenterologist at the [facility] clinic. She has Zofran and Phenergan at home, but these are not helping. She states that she is having regular bowel movements and flatus. ROS    Pertinent positives and negatives are stated within HPI, all other systems reviewed and are negative. Past Surgical History:   Procedure Laterality Date    APPENDECTOMY      CHOLECYSTECTOMY      HYSTERECTOMY     Social History:  reports that she has never smoked.  She has never used smokeless tobacco. She reports that she does not during the hospital encounter of 07/25/18   CBC Auto Differential   Result Value Ref Range    WBC 5.3 4.5 - 11.5 E9/L    RBC 4.18 3.50 - 5.50 E12/L    Hemoglobin 11.7 11.5 - 15.5 g/dL    Hematocrit 36.4 34.0 - 48.0 %    MCV 87.1 80.0 - 99.9 fL    MCH 28.0 26.0 - 35.0 pg    MCHC 32.1 32.0 - 34.5 %    RDW 14.5 11.5 - 15.0 fL    Platelets 377 647 - 387 E9/L    MPV 9.6 7.0 - 12.0 fL    Neutrophils % 47.9 43.0 - 80.0 %    Immature Granulocytes % 0.4 0.0 - 5.0 %    Lymphocytes % 42.2 (H) 20.0 - 42.0 %    Monocytes % 7.6 2.0 - 12.0 %    Eosinophils % 1.3 0.0 - 6.0 %    Basophils % 0.6 0.0 - 2.0 %    Neutrophils # 2.54 1.80 - 7.30 E9/L    Immature Granulocytes # 0.02 E9/L    Lymphocytes # 2.23 1.50 - 4.00 E9/L    Monocytes # 0.40 0.10 - 0.95 E9/L    Eosinophils # 0.07 0.05 - 0.50 E9/L    Basophils # 0.03 0.00 - 0.20 E6/C   Basic Metabolic Panel   Result Value Ref Range    Sodium 134 132 - 146 mmol/L    Potassium 4.3 3.5 - 5.0 mmol/L    Chloride 99 98 - 107 mmol/L    CO2 25 22 - 29 mmol/L    Anion Gap 10 7 - 16 mmol/L    Glucose 147 (H) 74 - 109 mg/dL    BUN 11 6 - 20 mg/dL    CREATININE 0.7 0.5 - 1.0 mg/dL    GFR Non-African American >60 >=60 mL/min/1.73    GFR African American >60     Calcium 9.0 8.6 - 10.2 mg/dL     Imaging: All Radiology results interpreted by Radiologist unless otherwise noted. No orders to display     ED Course / Medical Decision Making     Medications   0.9 % sodium chloride bolus (1,000 mLs Intravenous New Bag 7/26/18 0026)   ondansetron (ZOFRAN) injection 8 mg (8 mg Intravenous Given 7/26/18 0032)   prochlorperazine (COMPAZINE) injection 10 mg (10 mg Intravenous Given 7/26/18 0132)   diphenhydrAMINE (BENADRYL) injection 12.5 mg (12.5 mg Intravenous Given 7/26/18 0131)        Re-examination:  7/25/18       Time: 0150   Patient resting no distress. Negative for acute abdomen.   Patient states she feels much better after IV fluid and nausea medication. Consult(s):   none. Procedure(s):   none    MDM:   Patient presented with nausea and vomiting a known history of gastroparesis. Patient's diagnostics were reviewed and discussed with her. She was negative for acute abdomen. Patient states that she feels much better after IV fluid and nausea medication. Patient will be discharged home and instructed to follow-up with her gastroenterologist.  She is instructed to return to the emergency department immediately if any new or worsening symptoms. Counseling: The emergency provider has spoken with the patient and spouse/SO and discussed todays results, in addition to providing specific details for the plan of care and counseling regarding the diagnosis and prognosis. Questions are answered at this time and they are agreeable with the plan. Assessment     1. Non-intractable vomiting with nausea, unspecified vomiting type      Plan   Discharge to home  Patient condition is good    New Medications     New Prescriptions    No medications on file     Electronically signed by REYNOLD Andre CNP   DD: 7/25/18  **This report was transcribed using voice recognition software. Every effort was made to ensure accuracy; however, inadvertent computerized transcription errors may be present.   END OF ED PROVIDER NOTE     REYNOLD Rocha CNP  07/26/18 0157

## 2018-08-01 ENCOUNTER — TELEPHONE (OUTPATIENT)
Dept: NEUROSURGERY | Age: 43
End: 2018-08-01

## 2018-08-06 LAB
ANION GAP SERPL CALCULATED.3IONS-SCNC: 12 MMOL/L (ref 7–16)
BASOPHILS ABSOLUTE: 0.05 E9/L (ref 0–0.2)
BASOPHILS RELATIVE PERCENT: 0.9 % (ref 0–2)
BILIRUBIN URINE: NEGATIVE
BLOOD, URINE: NEGATIVE
BUN BLDV-MCNC: 11 MG/DL (ref 6–20)
CALCIUM SERPL-MCNC: 9.6 MG/DL (ref 8.6–10.2)
CHLORIDE BLD-SCNC: 101 MMOL/L (ref 98–107)
CLARITY: CLEAR
CO2: 24 MMOL/L (ref 22–29)
COLOR: YELLOW
CREAT SERPL-MCNC: 0.7 MG/DL (ref 0.5–1)
EOSINOPHILS ABSOLUTE: 0.05 E9/L (ref 0.05–0.5)
EOSINOPHILS RELATIVE PERCENT: 0.9 % (ref 0–6)
GFR AFRICAN AMERICAN: >60
GFR NON-AFRICAN AMERICAN: >60 ML/MIN/1.73
GLUCOSE BLD-MCNC: 145 MG/DL (ref 74–109)
GLUCOSE URINE: 100 MG/DL
HCT VFR BLD CALC: 36.8 % (ref 34–48)
HEMOGLOBIN: 12.4 G/DL (ref 11.5–15.5)
IMMATURE GRANULOCYTES #: 0.01 E9/L
IMMATURE GRANULOCYTES %: 0.2 % (ref 0–5)
KETONES, URINE: ABNORMAL MG/DL
LEUKOCYTE ESTERASE, URINE: NEGATIVE
LYMPHOCYTES ABSOLUTE: 2.23 E9/L (ref 1.5–4)
LYMPHOCYTES RELATIVE PERCENT: 42.2 % (ref 20–42)
MCH RBC QN AUTO: 29 PG (ref 26–35)
MCHC RBC AUTO-ENTMCNC: 33.7 % (ref 32–34.5)
MCV RBC AUTO: 86.2 FL (ref 80–99.9)
METER GLUCOSE: 143 MG/DL (ref 70–110)
MONOCYTES ABSOLUTE: 0.37 E9/L (ref 0.1–0.95)
MONOCYTES RELATIVE PERCENT: 7 % (ref 2–12)
NEUTROPHILS ABSOLUTE: 2.58 E9/L (ref 1.8–7.3)
NEUTROPHILS RELATIVE PERCENT: 48.8 % (ref 43–80)
NITRITE, URINE: NEGATIVE
PDW BLD-RTO: 14.7 FL (ref 11.5–15)
PH UA: 8.5 (ref 5–9)
PLATELET # BLD: 307 E9/L (ref 130–450)
PMV BLD AUTO: 9.9 FL (ref 7–12)
POTASSIUM SERPL-SCNC: 4.5 MMOL/L (ref 3.5–5)
PROTEIN UA: NEGATIVE MG/DL
RBC # BLD: 4.27 E12/L (ref 3.5–5.5)
SODIUM BLD-SCNC: 137 MMOL/L (ref 132–146)
SPECIFIC GRAVITY UA: 1.02 (ref 1–1.03)
UROBILINOGEN, URINE: 0.2 E.U./DL
WBC # BLD: 5.3 E9/L (ref 4.5–11.5)

## 2018-08-06 PROCEDURE — 81003 URINALYSIS AUTO W/O SCOPE: CPT

## 2018-08-06 PROCEDURE — 80048 BASIC METABOLIC PNL TOTAL CA: CPT

## 2018-08-06 PROCEDURE — 82962 GLUCOSE BLOOD TEST: CPT

## 2018-08-06 PROCEDURE — 85025 COMPLETE CBC W/AUTO DIFF WBC: CPT

## 2018-08-06 PROCEDURE — 36415 COLL VENOUS BLD VENIPUNCTURE: CPT

## 2018-08-06 ASSESSMENT — PAIN DESCRIPTION - PAIN TYPE: TYPE: ACUTE PAIN

## 2018-08-06 ASSESSMENT — PAIN DESCRIPTION - DESCRIPTORS: DESCRIPTORS: BURNING

## 2018-08-06 ASSESSMENT — PAIN DESCRIPTION - ORIENTATION: ORIENTATION: MID

## 2018-08-06 ASSESSMENT — PAIN SCALES - GENERAL: PAINLEVEL_OUTOF10: 9

## 2018-08-06 ASSESSMENT — PAIN DESCRIPTION - LOCATION: LOCATION: HEAD;NECK

## 2018-08-07 ENCOUNTER — HOSPITAL ENCOUNTER (EMERGENCY)
Age: 43
Discharge: HOME | End: 2018-08-07
Payer: MEDICAID

## 2018-08-07 VITALS
HEIGHT: 63 IN | SYSTOLIC BLOOD PRESSURE: 164 MMHG | HEART RATE: 98 BPM | WEIGHT: 232 LBS | OXYGEN SATURATION: 97 % | RESPIRATION RATE: 16 BRPM | DIASTOLIC BLOOD PRESSURE: 90 MMHG | BODY MASS INDEX: 41.11 KG/M2 | TEMPERATURE: 98.9 F

## 2018-08-07 DIAGNOSIS — R51.9 NONINTRACTABLE HEADACHE, UNSPECIFIED CHRONICITY PATTERN, UNSPECIFIED HEADACHE TYPE: Primary | ICD-10-CM

## 2018-08-07 PROCEDURE — 99283 EMERGENCY DEPT VISIT LOW MDM: CPT

## 2018-08-07 NOTE — ED NOTES
Pt reported feeling like her sugar is low. Pt clammy to touch. .      Cheryle Perez LPN  30/92/72 [id number]

## 2018-08-07 NOTE — ED PROVIDER NOTES
never smoked. She has never used smokeless tobacco. She reports that she does not drink alcohol or use drugs. Family History: family history is not on file. Allergies: Abilify [aripiprazole]; Adhesive tape; Aspartame and phenylalanine; Cymbalta [duloxetine hcl]; Darvon [propoxyphene]; Dilaudid [hydromorphone hcl]; Doxepin; Doxycycline; Effexor [venlafaxine]; Grapeseed extract [nutritional supplements]; Hydrochlorothiazide; Iron; Levaquin [levofloxacin in d5w]; Other; Pecan nut (diagnostic); Reglan [metoclopramide]; Rocephin [ceftriaxone]; Rosemary oil; Sulfa antibiotics; Tetracyclines & related; Topamax [topiramate]; Tramadol; Triptans; Tylenol [acetaminophen]; Thicket [macadamia nut oil]; and Zithromax [azithromycin]    Physical Exam           ED Triage Vitals [08/06/18 2049]   BP Temp Temp src Pulse Resp SpO2 Height Weight   (!) 159/96 98.9 °F (37.2 °C) -- 100 18 98 % 5' 3\" (1.6 m) 232 lb (105.2 kg)    Oxygen Saturation Interpretation: Normal.    Constitutional:  Alert, development consistent with age. HEENT:  NC/NT. PERRLA. Airway patent. Neck:  Normal ROM. Supple. No meningeal signs  Respiratory:  Clear to auscultation and breath sounds equal.  CV:  Regular rate and rhythm, normal heart sounds, without pathological murmurs, ectopy, gallops, or rubs. GI:  Abdomen Soft, nontender, good bowel sounds. No firm or pulsatile mass. Back:  No costovertebral tenderness. Extremities: No tenderness or edema noted. Integument:  Normal turgor. Warm, dry, without visible rash, unless noted elsewhere. Lymphatics: No lymphangitis or adenopathy noted. Neurological:  Oriented x 3, GCS 15. CNII-XII grossly intact. Motor functions intact.      Lab / Imaging Results   (All laboratory and radiology results have been personally reviewed by myself)  Labs:  Results for orders placed or performed during the hospital encounter of 08/07/18   CBC Auto Differential   Result Value Ref Range    WBC 5.3 4.5 - 11.5 E9/L    RBC 4. 27 3.50 - 5.50 E12/L    Hemoglobin 12.4 11.5 - 15.5 g/dL    Hematocrit 36.8 34.0 - 48.0 %    MCV 86.2 80.0 - 99.9 fL    MCH 29.0 26.0 - 35.0 pg    MCHC 33.7 32.0 - 34.5 %    RDW 14.7 11.5 - 15.0 fL    Platelets 909 796 - 373 E9/L    MPV 9.9 7.0 - 12.0 fL    Neutrophils % 48.8 43.0 - 80.0 %    Immature Granulocytes % 0.2 0.0 - 5.0 %    Lymphocytes % 42.2 (H) 20.0 - 42.0 %    Monocytes % 7.0 2.0 - 12.0 %    Eosinophils % 0.9 0.0 - 6.0 %    Basophils % 0.9 0.0 - 2.0 %    Neutrophils # 2.58 1.80 - 7.30 E9/L    Immature Granulocytes # 0.01 E9/L    Lymphocytes # 2.23 1.50 - 4.00 E9/L    Monocytes # 0.37 0.10 - 0.95 E9/L    Eosinophils # 0.05 0.05 - 0.50 E9/L    Basophils # 0.05 0.00 - 0.20 N9/J   Basic Metabolic Panel   Result Value Ref Range    Sodium 137 132 - 146 mmol/L    Potassium 4.5 3.5 - 5.0 mmol/L    Chloride 101 98 - 107 mmol/L    CO2 24 22 - 29 mmol/L    Anion Gap 12 7 - 16 mmol/L    Glucose 145 (H) 74 - 109 mg/dL    BUN 11 6 - 20 mg/dL    CREATININE 0.7 0.5 - 1.0 mg/dL    GFR Non-African American >60 >=60 mL/min/1.73    GFR African American >60     Calcium 9.6 8.6 - 10.2 mg/dL   Urinalysis   Result Value Ref Range    Color, UA Yellow Straw/Yellow    Clarity, UA Clear Clear    Glucose, Ur 100 (A) Negative mg/dL    Bilirubin Urine Negative Negative    Ketones, Urine TRACE (A) Negative mg/dL    Specific Gravity, UA 1.020 1.005 - 1.030    Blood, Urine Negative Negative    pH, UA 8.5 5.0 - 9.0    Protein, UA Negative Negative mg/dL    Urobilinogen, Urine 0.2 <2.0 E.U./dL    Nitrite, Urine Negative Negative    Leukocyte Esterase, Urine Negative Negative   POCT Glucose   Result Value Ref Range    Meter Glucose 143 (H) 70 - 110 mg/dL     Imaging: All Radiology results interpreted by Radiologist unless otherwise noted.   No orders to display     ED Course / Medical Decision Making   Medications - No data to display     Re-examination:  8/7/18       Time: 0105   Patient resting in no distress, no focal neurologic

## 2020-07-24 ENCOUNTER — TELEPHONE (OUTPATIENT)
Dept: ADMINISTRATIVE | Age: 45
End: 2020-07-24

## 2020-07-24 NOTE — TELEPHONE ENCOUNTER
Pt called and requested to establish with you. Her , Abdirizak Mckeon ( 62)is a pt of yours. Her current pcp is in [facility], and she would like to have a pcp closer to home. Is she able to establish with you?

## 2020-08-11 ENCOUNTER — TELEPHONE (OUTPATIENT)
Dept: PRIMARY CARE CLINIC | Age: 45
End: 2020-08-11

## 2020-08-11 ENCOUNTER — OFFICE VISIT (OUTPATIENT)
Dept: PRIMARY CARE CLINIC | Age: 45
End: 2020-08-11
Payer: MEDICAID

## 2020-08-11 VITALS
WEIGHT: 184 LBS | BODY MASS INDEX: 32.6 KG/M2 | DIASTOLIC BLOOD PRESSURE: 78 MMHG | TEMPERATURE: 98.2 F | HEART RATE: 78 BPM | HEIGHT: 63 IN | SYSTOLIC BLOOD PRESSURE: 128 MMHG | OXYGEN SATURATION: 97 %

## 2020-08-11 PROBLEM — D83.9 CVID (COMMON VARIABLE IMMUNODEFICIENCY) (HCC): Status: ACTIVE | Noted: 2019-03-05

## 2020-08-11 PROBLEM — E66.9 OBESITY, CLASS II, BMI 35-39.9: Status: ACTIVE | Noted: 2019-01-11

## 2020-08-11 PROBLEM — N60.01 SOLITARY CYST OF RIGHT BREAST: Status: ACTIVE | Noted: 2018-08-21

## 2020-08-11 PROBLEM — E04.1 THYROID NODULE: Status: ACTIVE | Noted: 2020-08-11

## 2020-08-11 PROBLEM — G47.00 INSOMNIA: Status: ACTIVE | Noted: 2020-08-11

## 2020-08-11 PROBLEM — H54.3 VISION LOSS, BILATERAL: Status: ACTIVE | Noted: 2018-10-01

## 2020-08-11 PROBLEM — G43.719 INTRACTABLE CHRONIC MIGRAINE WITHOUT AURA AND WITHOUT STATUS MIGRAINOSUS: Status: ACTIVE | Noted: 2017-08-08

## 2020-08-11 PROBLEM — F41.9 ANXIETY: Status: ACTIVE | Noted: 2020-08-11

## 2020-08-11 PROBLEM — Z99.89 OSA ON CPAP: Status: ACTIVE | Noted: 2017-11-14

## 2020-08-11 PROBLEM — M79.7 PRIMARY FIBROMYALGIA SYNDROME: Status: ACTIVE | Noted: 2020-08-11

## 2020-08-11 PROBLEM — R19.7 DIARRHEA: Status: ACTIVE | Noted: 2020-08-11

## 2020-08-11 PROBLEM — G25.81 RESTLESS LEGS: Status: ACTIVE | Noted: 2020-08-11

## 2020-08-11 PROBLEM — Z79.4 TYPE 2 DIABETES MELLITUS WITHOUT COMPLICATION, WITH LONG-TERM CURRENT USE OF INSULIN (HCC): Status: ACTIVE | Noted: 2018-04-13

## 2020-08-11 PROBLEM — Z79.4 LONG TERM CURRENT USE OF INSULIN (HCC): Status: ACTIVE | Noted: 2020-08-11

## 2020-08-11 PROBLEM — I73.00 RAYNAUD'S PHENOMENON WITHOUT GANGRENE: Status: ACTIVE | Noted: 2017-10-22

## 2020-08-11 PROBLEM — R13.14 PHARYNGOESOPHAGEAL DYSPHAGIA: Status: ACTIVE | Noted: 2018-09-18

## 2020-08-11 PROBLEM — E11.9 TYPE 2 DIABETES MELLITUS WITHOUT COMPLICATION, WITH LONG-TERM CURRENT USE OF INSULIN (HCC): Status: ACTIVE | Noted: 2018-04-13

## 2020-08-11 PROBLEM — G90.A POSTURAL ORTHOSTATIC TACHYCARDIA SYNDROME: Status: ACTIVE | Noted: 2020-08-11

## 2020-08-11 PROBLEM — M35.01 SJOGREN'S SYNDROME WITH KERATOCONJUNCTIVITIS SICCA (HCC): Status: ACTIVE | Noted: 2018-10-09

## 2020-08-11 PROBLEM — D64.9 ANEMIA: Status: ACTIVE | Noted: 2020-08-11

## 2020-08-11 PROBLEM — E28.2 POLYCYSTIC DISEASE, OVARIES: Status: ACTIVE | Noted: 2020-08-11

## 2020-08-11 PROBLEM — G93.2 BENIGN INTRACRANIAL HYPERTENSION: Status: ACTIVE | Noted: 2020-08-11

## 2020-08-11 PROBLEM — R91.1 LUNG NODULE: Status: ACTIVE | Noted: 2018-12-06

## 2020-08-11 PROBLEM — K21.9 GASTROESOPHAGEAL REFLUX DISEASE: Status: ACTIVE | Noted: 2017-02-09

## 2020-08-11 PROBLEM — G43.909 MIGRAINE: Status: ACTIVE | Noted: 2020-08-11

## 2020-08-11 PROBLEM — K58.9 IRRITABLE BOWEL SYNDROME: Status: ACTIVE | Noted: 2020-08-11

## 2020-08-11 PROBLEM — Z87.442 HISTORY OF RENAL CALCULI: Status: ACTIVE | Noted: 2020-08-11

## 2020-08-11 PROBLEM — G47.33 OSA ON CPAP: Status: ACTIVE | Noted: 2017-11-14

## 2020-08-11 PROBLEM — I10 ESSENTIAL HYPERTENSION: Status: ACTIVE | Noted: 2017-11-14

## 2020-08-11 PROBLEM — R33.9 RETENTION OF URINE: Status: ACTIVE | Noted: 2017-02-16

## 2020-08-11 PROBLEM — K59.00 CONSTIPATION: Status: ACTIVE | Noted: 2020-08-11

## 2020-08-11 PROBLEM — K31.84 GASTROPARESIS: Status: ACTIVE | Noted: 2020-08-11

## 2020-08-11 PROCEDURE — 1036F TOBACCO NON-USER: CPT | Performed by: INTERNAL MEDICINE

## 2020-08-11 PROCEDURE — 3046F HEMOGLOBIN A1C LEVEL >9.0%: CPT | Performed by: INTERNAL MEDICINE

## 2020-08-11 PROCEDURE — 2022F DILAT RTA XM EVC RTNOPTHY: CPT | Performed by: INTERNAL MEDICINE

## 2020-08-11 PROCEDURE — G8427 DOCREV CUR MEDS BY ELIG CLIN: HCPCS | Performed by: INTERNAL MEDICINE

## 2020-08-11 PROCEDURE — 99205 OFFICE O/P NEW HI 60 MIN: CPT | Performed by: INTERNAL MEDICINE

## 2020-08-11 PROCEDURE — G8417 CALC BMI ABV UP PARAM F/U: HCPCS | Performed by: INTERNAL MEDICINE

## 2020-08-11 RX ORDER — PANTOPRAZOLE SODIUM 40 MG/1
40 TABLET, DELAYED RELEASE ORAL
COMMUNITY
End: 2020-09-01 | Stop reason: SDUPTHER

## 2020-08-11 RX ORDER — PROPRANOLOL HYDROCHLORIDE 10 MG/1
10 TABLET ORAL 3 TIMES DAILY
COMMUNITY
End: 2020-10-05 | Stop reason: SDUPTHER

## 2020-08-11 RX ORDER — TIZANIDINE 2 MG/1
2 TABLET ORAL 2 TIMES DAILY
Qty: 180 TABLET | Refills: 2 | Status: SHIPPED | OUTPATIENT
Start: 2020-08-11 | End: 2020-11-09

## 2020-08-11 RX ORDER — GALCANEZUMAB 120 MG/ML
120 INJECTION, SOLUTION SUBCUTANEOUS
COMMUNITY
Start: 2019-11-18 | End: 2021-02-25 | Stop reason: SDUPTHER

## 2020-08-11 RX ORDER — POTASSIUM CHLORIDE 1.5 G/1.77G
20 POWDER, FOR SOLUTION ORAL DAILY
COMMUNITY
End: 2020-08-19 | Stop reason: SDUPTHER

## 2020-08-11 RX ORDER — TIZANIDINE 2 MG/1
2 TABLET ORAL 2 TIMES DAILY
COMMUNITY
End: 2020-08-11 | Stop reason: SDUPTHER

## 2020-08-11 ASSESSMENT — ENCOUNTER SYMPTOMS
RHINORRHEA: 0
NAUSEA: 1
VOMITING: 0
STRIDOR: 0
ANAL BLEEDING: 0
CONSTIPATION: 1
BLOOD IN STOOL: 0
SHORTNESS OF BREATH: 0
TROUBLE SWALLOWING: 0
COLOR CHANGE: 0
FACIAL SWELLING: 0
ABDOMINAL PAIN: 0
WHEEZING: 0
EYE ITCHING: 0
SORE THROAT: 0
EYE DISCHARGE: 0
COUGH: 0
PHOTOPHOBIA: 0
EYE PAIN: 0
DIARRHEA: 1

## 2020-08-11 ASSESSMENT — PATIENT HEALTH QUESTIONNAIRE - PHQ9
SUM OF ALL RESPONSES TO PHQ QUESTIONS 1-9: 0
1. LITTLE INTEREST OR PLEASURE IN DOING THINGS: 0
2. FEELING DOWN, DEPRESSED OR HOPELESS: 0
SUM OF ALL RESPONSES TO PHQ9 QUESTIONS 1 & 2: 0
SUM OF ALL RESPONSES TO PHQ QUESTIONS 1-9: 0

## 2020-08-11 NOTE — PROGRESS NOTES
2020    Name: Primitivo Sheffield : 1975 Sex: female  Age: 45 y.o. Subjective:  Chief Complaint   Patient presents with    Annual Exam    Established New Doctor        HPI     79-year-old female here to establish ongoing care. Both of her previous care has been done through physicians from [facility] [place] [facility] at various places. We will obtain records from her previous PCP. She has numerous medical problems. Problem #1: Hypertension  Blood pressures are fairly reasonable on present medications    Problem #2 history of pseudotumor cerebri  She follows up with Dr. Estanislado Boxer. She had a recent lumbar puncture which was unremarkable. Problem #3 insulin requiring type 2 diabetes mellitus  Blood sugars are acceptable on present dose of insulin. She follows up with an endocrinologist in the [place] area. Problem #4 GI complaints  She has gastroparesis, dysphagia,She has had esophageal dilatations in the past, GERD irritable bowel syndrome with alternating constipation diarrhea. Nonalcoholic fatty liver disease. Problem #5: Common variable immunodeficiency-IgG. She gets IgG infusions on a regular basis, these are ordered by her hematologist.  She also has MTHFR mutation she has a history of iron deficiency anemia    Problem #5: Hypothyroidism  She follows with her endocrinologist for this as well. She has a known thyroid nodule    Problem #6: Depression  She is on antidepressants and antianxiety medications. She follows with her psychiatrist    Problem #7: Respiratory problems  She has a history of asthma and obstructive sleep apnea on a CPAP. She has a known lung nodule, right upper lobe discovered in 2018    . Problem #8: Connective tissue disorder  She has some symptoms of Sjogren's syndrome. We are not sure whether she also has an overlap syndrome with another connective tissue problem. Problem #8:  Blindness  She has complete loss of vision in 1 eye and almost total loss of vision in the other. Problem #9: Musculoskeletal problems  She has a history of fibromyalgia, chronic cervicalgia. MRI of her cervical spine was done recently. Reviewed report. History of restless leg syndrome. She has history of degenerative disc disease and cervical spondylosis    Problem #10: Migraines  She follows up with neurologist who has her on different medications. She has an accidental medication error in [facility]. They say she is on or has been on Suboxone but she never had this medication. Apparently another Di Starch had this medicine but [facility] refuses to correct this per patient report    She has a history of kidney stones, hyperlipidemia, and cysts of the right breast.      Review of Systems   Constitutional: Positive for fatigue. Negative for appetite change and unexpected weight change. HENT: Negative for congestion, ear pain, facial swelling, rhinorrhea, sore throat, tinnitus and trouble swallowing. Eyes: Negative for photophobia, pain, discharge, itching and visual disturbance. No vision in left eye and partial vision right   Respiratory: Negative for cough, shortness of breath, wheezing and stridor. Cardiovascular: Negative for chest pain, palpitations and leg swelling. Gastrointestinal: Positive for constipation, diarrhea and nausea. Negative for abdominal pain, anal bleeding, blood in stool and vomiting. Dysphagia   Endocrine: Negative for cold intolerance, heat intolerance, polydipsia, polyphagia and polyuria. Genitourinary: Positive for urgency. Negative for difficulty urinating, dysuria, flank pain, frequency and hematuria. Musculoskeletal: Positive for myalgias and neck pain. Negative for arthralgias, gait problem and joint swelling. Skin: Negative for color change, pallor and rash. Allergic/Immunologic: Negative for environmental allergies and food allergies. Neurological: Positive for headaches.  Negative for dizziness, tremors, seizures, syncope, speech difficulty, weakness, light-headedness and numbness. Hematological: Negative for adenopathy. Does not bruise/bleed easily. Psychiatric/Behavioral: Negative for agitation, behavioral problems, confusion, sleep disturbance and suicidal ideas. The patient is not nervous/anxious. Current Outpatient Medications:     propranolol (INDERAL) 10 MG tablet, Take 10 mg by mouth 3 times daily, Disp: , Rfl:     potassium chloride (KLOR-CON) 20 MEQ packet, Take 20 mEq by mouth daily, Disp: , Rfl:     levomilnacipran (FETZIMA) 40 MG CP24 extended release capsule, Take 40 mg by mouth daily, Disp: , Rfl:     pantoprazole sodium (PROTONIX) 40 MG PACK packet, Take 40 mg by mouth every morning (before breakfast), Disp: , Rfl:     tiZANidine (ZANAFLEX) 2 MG tablet, Take 1 tablet by mouth 2 times daily, Disp: 180 tablet, Rfl: 2    Immune Globulin, Human, (GAMMAGARD) 30 GM/300ML SOLN, Give 80 gm IVIg every 2 weeks and run each infusion slowly over 8 hours for 6 months till 08/30/2019. Premedicate 30 min prior: 20 mg IV Solumedrol, 25 mg PO Benadryl, 500 mg PO Tylenol, 500 cc saline pre- and 500 cc saline post infusion. Dx: CVID (D83.9), Disp: , Rfl:     Galcanezumab-gnlm (EMGALITY) 120 MG/ML SOAJ, Inject into the skin, Disp: , Rfl:     clonazePAM (KLONOPIN) 0.5 MG tablet, Take 0.5 mg by mouth 2 times daily. ., Disp: , Rfl:     divalproex (DEPAKOTE) 250 MG DR tablet, TK 1 T PO BID AND 2 TS HS, Disp: , Rfl:     albuterol sulfate  (90 Base) MCG/ACT inhaler, Inhale 2 puffs into the lungs every 6 hours as needed for Wheezing, Disp: , Rfl:     aprepitant (EMEND) 80 MG capsule, Take 80 mg by mouth daily, Disp: , Rfl:     calcium carbonate (OSCAL) 500 MG TABS tablet, Take 500 mg by mouth daily, Disp: , Rfl:     CPAP Machine MISC, by Does not apply route, Disp: , Rfl:     econazole nitrate 1 % cream, Apply topically daily Apply topically daily. , Disp: , Rfl:     EPINEPHrine (EPIPEN) 0.3 MG/0.3ML SOAJ injection, Inject 0.3 mg into the muscle as needed Use as directed for allergic reaction, Disp: , Rfl:     vitamin D (ERGOCALCIFEROL) 56702 units CAPS capsule, Take 50,000 Units by mouth once a week, Disp: , Rfl:     Ferrous Gluconate (IRON 27 PO), Take by mouth, Disp: , Rfl:     fluticasone (FLONASE) 50 MCG/ACT nasal spray, 1 spray by Nasal route daily, Disp: , Rfl:     fluticasone-salmeterol (ADVAIR HFA) 230-21 MCG/ACT inhaler, Inhale 2 puffs into the lungs 2 times daily, Disp: , Rfl:     folic acid (FOLVITE) 1 MG tablet, Take 1 mg by mouth daily, Disp: , Rfl:     furosemide (LASIX) 20 MG tablet, Take 20 mg by mouth daily, Disp: , Rfl:     insulin lispro (HUMALOG KWIKPEN) 100 UNIT/ML pen, Inject into the skin 3 times daily (before meals), Disp: , Rfl:     Immune Globulin, Human, 10 GM/100ML SOLN IV solution, Infuse intravenously once a week, Disp: , Rfl:     insulin glargine (LANTUS) 100 UNIT/ML injection vial, Inject into the skin nightly, Disp: , Rfl:     ipratropium-albuterol (DUONEB) 0.5-2.5 (3) MG/3ML SOLN nebulizer solution, Inhale 1 vial into the lungs every 4 hours, Disp: , Rfl:     levothyroxine (SYNTHROID) 112 MCG tablet, Take 112 mcg by mouth Daily, Disp: , Rfl:     AMYLASE-LIPASE-PROTEASE PO, Take by mouth, Disp: , Rfl:     magnesium oxide (MAG-OX) 400 MG tablet, Take 400 mg by mouth daily, Disp: , Rfl:     melatonin 3 MG TABS tablet, Take 5 mg by mouth nightly as needed, Disp: , Rfl:     methocarbamol (ROBAXIN) 750 MG tablet, Take 750 mg by mouth 3 times daily, Disp: , Rfl:     montelukast (SINGULAIR) 10 MG tablet, Take 10 mg by mouth nightly, Disp: , Rfl:     ondansetron (ZOFRAN-ODT) 4 MG disintegrating tablet, Take 4 mg by mouth every 8 hours as needed for Nausea or Vomiting, Disp: , Rfl:     polyethylene glycol (GLYCOLAX) powder, Take 17 g by mouth daily, Disp: , Rfl:     rOPINIRole (REQUIP) 0.5 MG tablet, Take 0.5 mg by mouth 3 times daily, Disp: , Rfl:     spironolactone (ALDACTONE) 50 MG tablet, Take 50 mg by mouth daily, Disp: , Rfl:     Tens Unit MISC, by Does not apply route, Disp: , Rfl:     polyethyl glycol-propyl glycol 0.4-0.3 % (SYSTANE) 0.4-0.3 % ophthalmic solution, 1 drop as needed for Dry Eyes, Disp: , Rfl:     diclofenac sodium (VOLTAREN) 1 % GEL, Apply 4x daily as needed, Disp: 100 g, Rfl: 0     Allergies   Allergen Reactions    Abilify [Aripiprazole]     Adhesive Tape     Aspartame And Phenylalanine     Cymbalta [Duloxetine Hcl]     Darvon [Propoxyphene]      darvocet    Diamox [Acetazolamide]     Dilaudid [Hydromorphone Hcl]     Doxepin     Doxycycline     Effexor [Venlafaxine]     Grapeseed Extract [Nutritional Supplements]      grapes    Hydrochlorothiazide     Iron     Levaquin [Levofloxacin In D5w]     Other      strawberries    Pecan Nut (Diagnostic)     Reglan [Metoclopramide]     Rocephin [Ceftriaxone]     Rosemary Oil     Sulfa Antibiotics     Tetracyclines & Related     Topamax [Topiramate]     Tramadol     Triptans     Tylenol [Acetaminophen]     Merced [Macadamia Nut Oil]     Zithromax [Azithromycin]         Past Medical History:   Diagnosis Date    Asthma     CPAP (continuous positive airway pressure) dependence     Depression     Fibromyalgia     Headache     Hematuria     Immune deficiency disorder (HCC)     Rectal bleeding     Scleroderma (HCC)        Health Maintenance Due   Topic Date Due    TSH testing  1975    Diabetic foot exam  12/18/1987    A1C test (Diabetic or Prediabetic)  12/18/1987    Diabetic retinal exam  12/18/1987    Lipid screen  12/18/1987    HIV screen  12/18/1992    Diabetic microalbuminuria test  12/18/1995    Hepatitis B vaccine (1 of 3 - Risk 3-dose series) 12/18/1996    Cervical cancer screen  12/18/1998        Patient Active Problem List   Diagnosis    Vision loss, bilateral    Type 2 diabetes mellitus without complication, with long-term current use of insulin (HCC)    Thyroid nodule  Solitary cyst of right breast    Sjogren's syndrome with keratoconjunctivitis sicca (HCC)    Retention of urine    Restless legs    Raynaud's phenomenon without gangrene    Primary fibromyalgia syndrome    Postural orthostatic tachycardia syndrome    PAULINE on CPAP    Obesity, Class II, BMI 35-39.9    Other and unspecified hyperlipidemia    Pharyngoesophageal dysphagia    Lung nodule    Long term current use of insulin (HCC)    Insomnia    Intractable chronic migraine without aura and without status migrainosus    Hypothyroidism    Hypogammaglobulinemia (HCC)    History of renal calculi    Gastroparesis    Gastroesophageal reflux disease    Essential hypertension    DDD (degenerative disc disease)    CVID (common variable immunodeficiency) (HCC)    Migraine    Irritable bowel syndrome    Constipation    Diarrhea    Benign intracranial hypertension    Depression    Anxiety    Anemia    Polycystic disease, ovaries    Cervicalgia    Asthma    Connective tissue disorder ([facility])        Past Surgical History:   Procedure Laterality Date    APPENDECTOMY      CHOLECYSTECTOMY      HYSTERECTOMY          Family History   Problem Relation Age of Onset    Stroke Sister     Osteoporosis Sister     Hypertension Brother         Social History     Tobacco Use    Smoking status: Never Smoker    Smokeless tobacco: Never Used   Substance Use Topics    Alcohol use: No    Drug use: No        Objective  Vitals:    08/11/20 1059   BP: 128/78   Site: Right Upper Arm   Position: Sitting   Cuff Size: Medium Adult   Pulse: 78   Temp: 98.2 °F (36.8 °C)   TempSrc: Temporal   SpO2: 97%   Weight: 184 lb (83.5 kg)   Height: 5' 3\" (1.6 m)        Exam:  Physical Exam  Constitutional:       General: She is not in acute distress. Appearance: She is well-developed. She is obese. She is not ill-appearing. Comments: Patient is blind   HENT:      Head: Normocephalic.       Right Ear: External ear normal. Left Ear: External ear normal.   Eyes:      General: No scleral icterus. Right eye: No discharge. Left eye: No discharge. Conjunctiva/sclera: Conjunctivae normal.      Pupils: Pupils are equal, round, and reactive to light. Neck:      Musculoskeletal: Normal range of motion and neck supple. Thyroid: No thyromegaly. Cardiovascular:      Rate and Rhythm: Normal rate and regular rhythm. Heart sounds: Normal heart sounds. No murmur. No friction rub. No gallop. Pulmonary:      Effort: Pulmonary effort is normal. No respiratory distress. Breath sounds: Normal breath sounds. No wheezing or rales. Chest:      Chest wall: No tenderness. Abdominal:      General: Bowel sounds are normal. There is no distension. Palpations: Abdomen is soft. There is no mass. Tenderness: There is no abdominal tenderness. There is no guarding or rebound. Musculoskeletal: Normal range of motion. General: No tenderness or deformity. Lymphadenopathy:      Cervical: No cervical adenopathy. Skin:     General: Skin is warm and dry. Coloration: Skin is not pale. Findings: No erythema or rash. Neurological:      Mental Status: She is alert and oriented to person, place, and time. Cranial Nerves: No cranial nerve deficit. Sensory: No sensory deficit. Deep Tendon Reflexes: Reflexes normal.   Psychiatric:         Mood and Affect: Mood normal.         Behavior: Behavior normal.         Thought Content: Thought content normal.         Judgment: Judgment normal.          Last labs reviewed. ASSESSMENT & PLAN :   Problem List        Circulatory    Essential hypertension     Continue medications. Have her  monitor her blood pressures at least 3 times a week in the morning.   Let me know if they are consistently over 811 systolic or over 90 diastolic            Respiratory    PAULINE on CPAP     Continue CPAP, does well on it         Asthma     Continue inhalers. Find out her last chest x-ray and pulmonary function tests were done from her previous PCP         Relevant Medications    albuterol sulfate  (90 Base) MCG/ACT inhaler    fluticasone-salmeterol (ADVAIR HFA) 230-21 MCG/ACT inhaler    ipratropium-albuterol (DUONEB) 0.5-2.5 (3) MG/3ML SOLN nebulizer solution    montelukast (SINGULAIR) 10 MG tablet       Digestive    Pharyngoesophageal dysphagia     Await GI evaluation. Relevant Orders    Amb External Referral To Gastroenterology    Gastroparesis - Primary     Refer to GI, find out when last gastric emptying time was done         Relevant Orders    Amb External Referral To Gastroenterology    Gastroesophageal reflux disease     Continue her PPI         Relevant Medications    aprepitant (EMEND) 80 MG capsule    AMYLASE-LIPASE-PROTEASE PO    magnesium oxide (MAG-OX) 400 MG tablet    ondansetron (ZOFRAN-ODT) 4 MG disintegrating tablet    polyethylene glycol (GLYCOLAX) powder    pantoprazole sodium (PROTONIX) 40 MG PACK packet    Other Relevant Orders    Amb External Referral To Gastroenterology    Irritable bowel syndrome     Refer to GI, continue medications         Relevant Medications    aprepitant (EMEND) 80 MG capsule    AMYLASE-LIPASE-PROTEASE PO    magnesium oxide (MAG-OX) 400 MG tablet    ondansetron (ZOFRAN-ODT) 4 MG disintegrating tablet    polyethylene glycol (GLYCOLAX) powder    pantoprazole sodium (PROTONIX) 40 MG PACK packet    Other Relevant Orders    Amb External Referral To Gastroenterology       Endocrine    Type 2 diabetes mellitus without complication, with long-term current use of insulin (HCC)     Continue with Lantus and Humalog as directed. Monitor blood sugars as directed.   Find out what last hemoglobin A1c was done         Relevant Medications    insulin lispro (HUMALOG KWIKPEN) 100 UNIT/ML pen    insulin glargine (LANTUS) 100 UNIT/ML injection vial    Hypothyroidism     Continue with home meds, find out the last TSH and free T4         Relevant Medications    levothyroxine (SYNTHROID) 112 MCG tablet       Other    Restless legs     Continue with medication         Lung nodule     Get results of last CT scan of chest         Insomnia     Continue clonazepam 0.5 mg twice daily         Hypogammaglobulinemia (HCC)    CVID (common variable immunodeficiency) (HCC)     Intravenous immunoglobulin G as directed by hematologist         Migraine     As per neurologist         Relevant Medications    clonazePAM (KLONOPIN) 0.5 MG tablet    divalproex (DEPAKOTE) 250 MG DR tablet    methocarbamol (ROBAXIN) 750 MG tablet    propranolol (INDERAL) 10 MG tablet    levomilnacipran (FETZIMA) 40 MG CP24 extended release capsule    tiZANidine (ZANAFLEX) 2 MG tablet    Galcanezumab-gnlm (EMGALITY) 120 MG/ML SOAJ    Anemia     Check last CBC report         Cervicalgia     Follow-up with Dr. Carol Bess as directed         Relevant Medications    tiZANidine (ZANAFLEX) 2 MG tablet    Connective tissue disorder ([facility] [address]     Follow-up with rheumatologist                Return in about 3 months (around 11/11/2020), or be fasting on next office visit.        Hnagrosalio Enamoradow, DO  8/12/2020

## 2020-08-12 PROBLEM — M54.2 CERVICALGIA: Status: ACTIVE | Noted: 2020-08-12

## 2020-08-12 NOTE — ASSESSMENT & PLAN NOTE
Continue inhalers.   Find out her last chest x-ray and pulmonary function tests were done from her previous PCP

## 2020-08-12 NOTE — ASSESSMENT & PLAN NOTE
Continue with Lantus and Humalog as directed. Monitor blood sugars as directed.   Find out what last hemoglobin A1c was done

## 2020-08-14 ENCOUNTER — TELEPHONE (OUTPATIENT)
Dept: PRIMARY CARE CLINIC | Age: 45
End: 2020-08-14

## 2020-08-14 NOTE — TELEPHONE ENCOUNTER
Last Appointment:  8/11/2020  Future Appointments   Date Time Provider Alisa Pike   11/12/2020 11:00 AM [facility]      Patient called about cardiologist referral.  She would like to see Dr. Chyna Matthew.     Electronically signed by Charlee Akins LPN on 4/87/6850 at 53:08 AM

## 2020-08-19 RX ORDER — POTASSIUM CHLORIDE 1.5 G/1.77G
20 POWDER, FOR SOLUTION ORAL DAILY
Qty: 90 PACKET | Refills: 2 | Status: SHIPPED | OUTPATIENT
Start: 2020-08-19 | End: 2021-08-30

## 2020-08-19 NOTE — TELEPHONE ENCOUNTER
Last Appointment:  8/11/2020  Future Appointments   Date Time Provider Alisa Pike   11/12/2020 11:00 AM [facility], DO Beth BAUTISTA [facility]      Refill pending also pt would like you to call her [phone]

## 2020-08-27 ENCOUNTER — APPOINTMENT (OUTPATIENT)
Dept: CT IMAGING | Age: 45
End: 2020-08-27
Payer: MEDICAID

## 2020-08-27 ENCOUNTER — HOSPITAL ENCOUNTER (OUTPATIENT)
Age: 45
Setting detail: OBSERVATION
Discharge: HOME | End: 2020-08-28
Attending: EMERGENCY MEDICINE
Payer: MEDICAID

## 2020-08-27 ENCOUNTER — APPOINTMENT (OUTPATIENT)
Dept: MRI IMAGING | Age: 45
End: 2020-08-27
Payer: MEDICAID

## 2020-08-27 PROBLEM — R29.90 STROKE-LIKE SYMPTOMS: Status: ACTIVE | Noted: 2020-08-27

## 2020-08-27 PROBLEM — G45.9 TIA (TRANSIENT ISCHEMIC ATTACK): Status: ACTIVE | Noted: 2020-08-27

## 2020-08-27 LAB
ALBUMIN SERPL-MCNC: 4 G/DL (ref 3.5–5.2)
ALP BLD-CCNC: 50 U/L (ref 35–104)
ALT SERPL-CCNC: 19 U/L (ref 0–32)
ANION GAP SERPL CALCULATED.3IONS-SCNC: 9 MMOL/L (ref 7–16)
AST SERPL-CCNC: 16 U/L (ref 0–31)
BASOPHILS ABSOLUTE: 0.04 E9/L (ref 0–0.2)
BASOPHILS RELATIVE PERCENT: 0.7 % (ref 0–2)
BILIRUB SERPL-MCNC: 0.2 MG/DL (ref 0–1.2)
BUN BLDV-MCNC: 10 MG/DL (ref 6–20)
CALCIUM SERPL-MCNC: 9 MG/DL (ref 8.6–10.2)
CHLORIDE BLD-SCNC: 102 MMOL/L (ref 98–107)
CO2: 25 MMOL/L (ref 22–29)
CREAT SERPL-MCNC: 0.6 MG/DL (ref 0.5–1)
EKG ATRIAL RATE: 81 BPM
EKG P AXIS: 59 DEGREES
EKG P-R INTERVAL: 158 MS
EKG Q-T INTERVAL: 402 MS
EKG QRS DURATION: 82 MS
EKG QTC CALCULATION (BAZETT): 466 MS
EKG R AXIS: 38 DEGREES
EKG T AXIS: 104 DEGREES
EKG VENTRICULAR RATE: 81 BPM
EOSINOPHILS ABSOLUTE: 0.07 E9/L (ref 0.05–0.5)
EOSINOPHILS RELATIVE PERCENT: 1.2 % (ref 0–6)
GFR AFRICAN AMERICAN: >60
GFR NON-AFRICAN AMERICAN: >60 ML/MIN/1.73
GLUCOSE BLD-MCNC: 186 MG/DL (ref 74–99)
HCT VFR BLD CALC: 38.7 % (ref 34–48)
HEMOGLOBIN: 12.5 G/DL (ref 11.5–15.5)
IMMATURE GRANULOCYTES #: 0.02 E9/L
IMMATURE GRANULOCYTES %: 0.3 % (ref 0–5)
LYMPHOCYTES ABSOLUTE: 1.96 E9/L (ref 1.5–4)
LYMPHOCYTES RELATIVE PERCENT: 32.9 % (ref 20–42)
MCH RBC QN AUTO: 29.6 PG (ref 26–35)
MCHC RBC AUTO-ENTMCNC: 32.3 % (ref 32–34.5)
MCV RBC AUTO: 91.5 FL (ref 80–99.9)
METER GLUCOSE: 102 MG/DL (ref 74–99)
METER GLUCOSE: 117 MG/DL (ref 74–99)
METER GLUCOSE: 136 MG/DL (ref 74–99)
MONOCYTES ABSOLUTE: 0.4 E9/L (ref 0.1–0.95)
MONOCYTES RELATIVE PERCENT: 6.7 % (ref 2–12)
NEUTROPHILS ABSOLUTE: 3.46 E9/L (ref 1.8–7.3)
NEUTROPHILS RELATIVE PERCENT: 58.2 % (ref 43–80)
PDW BLD-RTO: 12.6 FL (ref 11.5–15)
PLATELET # BLD: 206 E9/L (ref 130–450)
PMV BLD AUTO: 10.9 FL (ref 7–12)
POTASSIUM SERPL-SCNC: 4 MMOL/L (ref 3.5–5)
RBC # BLD: 4.23 E12/L (ref 3.5–5.5)
SODIUM BLD-SCNC: 136 MMOL/L (ref 132–146)
TOTAL PROTEIN: 6.2 G/DL (ref 6.4–8.3)
WBC # BLD: 6 E9/L (ref 4.5–11.5)

## 2020-08-27 PROCEDURE — 6360000002 HC RX W HCPCS: Performed by: CLINICAL NURSE SPECIALIST

## 2020-08-27 PROCEDURE — 6370000000 HC RX 637 (ALT 250 FOR IP): Performed by: CLINICAL NURSE SPECIALIST

## 2020-08-27 PROCEDURE — 2580000003 HC RX 258: Performed by: CLINICAL NURSE SPECIALIST

## 2020-08-27 PROCEDURE — 6360000002 HC RX W HCPCS: Performed by: HOSPITALIST

## 2020-08-27 PROCEDURE — G0378 HOSPITAL OBSERVATION PER HR: HCPCS

## 2020-08-27 PROCEDURE — 70551 MRI BRAIN STEM W/O DYE: CPT

## 2020-08-27 PROCEDURE — 99285 EMERGENCY DEPT VISIT HI MDM: CPT

## 2020-08-27 PROCEDURE — 94664 DEMO&/EVAL PT USE INHALER: CPT

## 2020-08-27 PROCEDURE — 80053 COMPREHEN METABOLIC PANEL: CPT

## 2020-08-27 PROCEDURE — 99218 PR INITIAL OBSERVATION CARE/DAY 30 MINUTES: CPT | Performed by: PSYCHIATRY & NEUROLOGY

## 2020-08-27 PROCEDURE — 70496 CT ANGIOGRAPHY HEAD: CPT

## 2020-08-27 PROCEDURE — 94640 AIRWAY INHALATION TREATMENT: CPT

## 2020-08-27 PROCEDURE — 6360000004 HC RX CONTRAST MEDICATION: Performed by: RADIOLOGY

## 2020-08-27 PROCEDURE — 85025 COMPLETE CBC W/AUTO DIFF WBC: CPT

## 2020-08-27 PROCEDURE — 6370000000 HC RX 637 (ALT 250 FOR IP): Performed by: HOSPITALIST

## 2020-08-27 PROCEDURE — 82962 GLUCOSE BLOOD TEST: CPT

## 2020-08-27 PROCEDURE — 93005 ELECTROCARDIOGRAM TRACING: CPT | Performed by: EMERGENCY MEDICINE

## 2020-08-27 PROCEDURE — 96375 TX/PRO/DX INJ NEW DRUG ADDON: CPT

## 2020-08-27 PROCEDURE — 96374 THER/PROPH/DIAG INJ IV PUSH: CPT

## 2020-08-27 PROCEDURE — 96372 THER/PROPH/DIAG INJ SC/IM: CPT

## 2020-08-27 PROCEDURE — 93010 ELECTROCARDIOGRAM REPORT: CPT | Performed by: INTERNAL MEDICINE

## 2020-08-27 PROCEDURE — 36591 DRAW BLOOD OFF VENOUS DEVICE: CPT

## 2020-08-27 PROCEDURE — 2580000003 HC RX 258: Performed by: HOSPITALIST

## 2020-08-27 PROCEDURE — 6360000002 HC RX W HCPCS: Performed by: FAMILY MEDICINE

## 2020-08-27 PROCEDURE — 70498 CT ANGIOGRAPHY NECK: CPT

## 2020-08-27 RX ORDER — PANTOPRAZOLE SODIUM 40 MG/1
40 TABLET, DELAYED RELEASE ORAL
Status: DISCONTINUED | OUTPATIENT
Start: 2020-08-28 | End: 2020-08-28 | Stop reason: HOSPADM

## 2020-08-27 RX ORDER — BUDESONIDE 0.5 MG/2ML
0.5 INHALANT ORAL 2 TIMES DAILY
Status: DISCONTINUED | OUTPATIENT
Start: 2020-08-27 | End: 2020-08-28 | Stop reason: HOSPADM

## 2020-08-27 RX ORDER — DEXTROSE MONOHYDRATE 25 G/50ML
12.5 INJECTION, SOLUTION INTRAVENOUS PRN
Status: DISCONTINUED | OUTPATIENT
Start: 2020-08-27 | End: 2020-08-28 | Stop reason: HOSPADM

## 2020-08-27 RX ORDER — ONDANSETRON 2 MG/ML
4 INJECTION INTRAMUSCULAR; INTRAVENOUS EVERY 6 HOURS PRN
Status: DISCONTINUED | OUTPATIENT
Start: 2020-08-27 | End: 2020-08-28 | Stop reason: HOSPADM

## 2020-08-27 RX ORDER — LANOLIN ALCOHOL/MO/W.PET/CERES
3 CREAM (GRAM) TOPICAL NIGHTLY PRN
Status: DISCONTINUED | OUTPATIENT
Start: 2020-08-27 | End: 2020-08-28 | Stop reason: HOSPADM

## 2020-08-27 RX ORDER — ACETAMINOPHEN 650 MG/1
650 SUPPOSITORY RECTAL EVERY 6 HOURS PRN
Status: DISCONTINUED | OUTPATIENT
Start: 2020-08-27 | End: 2020-08-28 | Stop reason: HOSPADM

## 2020-08-27 RX ORDER — PROPRANOLOL HYDROCHLORIDE 10 MG/1
10 TABLET ORAL 3 TIMES DAILY
Status: DISCONTINUED | OUTPATIENT
Start: 2020-08-27 | End: 2020-08-28 | Stop reason: HOSPADM

## 2020-08-27 RX ORDER — IPRATROPIUM BROMIDE AND ALBUTEROL SULFATE 2.5; .5 MG/3ML; MG/3ML
1 SOLUTION RESPIRATORY (INHALATION) EVERY 4 HOURS
Status: DISCONTINUED | OUTPATIENT
Start: 2020-08-27 | End: 2020-08-28 | Stop reason: HOSPADM

## 2020-08-27 RX ORDER — ONDANSETRON 2 MG/ML
4 INJECTION INTRAMUSCULAR; INTRAVENOUS EVERY 6 HOURS PRN
Status: DISCONTINUED | OUTPATIENT
Start: 2020-08-27 | End: 2020-08-27 | Stop reason: SDUPTHER

## 2020-08-27 RX ORDER — CLONAZEPAM 0.5 MG/1
0.5 TABLET ORAL 2 TIMES DAILY
Status: DISCONTINUED | OUTPATIENT
Start: 2020-08-27 | End: 2020-08-28 | Stop reason: HOSPADM

## 2020-08-27 RX ORDER — SODIUM CHLORIDE 0.9 % (FLUSH) 0.9 %
10 SYRINGE (ML) INJECTION EVERY 12 HOURS SCHEDULED
Status: DISCONTINUED | OUTPATIENT
Start: 2020-08-27 | End: 2020-08-28 | Stop reason: HOSPADM

## 2020-08-27 RX ORDER — ACETAMINOPHEN 325 MG/1
650 TABLET ORAL EVERY 6 HOURS PRN
Status: DISCONTINUED | OUTPATIENT
Start: 2020-08-27 | End: 2020-08-28 | Stop reason: HOSPADM

## 2020-08-27 RX ORDER — ACETAMINOPHEN 325 MG/1
650 TABLET ORAL EVERY 6 HOURS PRN
Status: DISCONTINUED | OUTPATIENT
Start: 2020-08-27 | End: 2020-08-27 | Stop reason: SDUPTHER

## 2020-08-27 RX ORDER — TRIAMCINOLONE ACETONIDE 1 MG/G
CREAM TOPICAL DAILY PRN
Status: ON HOLD | COMMUNITY
End: 2021-07-28

## 2020-08-27 RX ORDER — AZELASTINE 1 MG/ML
1 SPRAY, METERED NASAL 2 TIMES DAILY
COMMUNITY

## 2020-08-27 RX ORDER — ROPINIROLE 0.5 MG/1
0.5 TABLET, FILM COATED ORAL 3 TIMES DAILY
Status: DISCONTINUED | OUTPATIENT
Start: 2020-08-27 | End: 2020-08-28 | Stop reason: HOSPADM

## 2020-08-27 RX ORDER — INSULIN GLARGINE 100 [IU]/ML
2 INJECTION, SOLUTION SUBCUTANEOUS EVERY MORNING
Status: DISCONTINUED | OUTPATIENT
Start: 2020-08-28 | End: 2020-08-28 | Stop reason: HOSPADM

## 2020-08-27 RX ORDER — FUROSEMIDE 20 MG/1
20 TABLET ORAL DAILY
Status: DISCONTINUED | OUTPATIENT
Start: 2020-08-27 | End: 2020-08-28 | Stop reason: HOSPADM

## 2020-08-27 RX ORDER — BACITRACIN ZINC AND POLYMYXIN B SULFATE 500; 10000 [USP'U]/G; [USP'U]/G
1 OINTMENT TOPICAL NIGHTLY
COMMUNITY
End: 2020-10-15

## 2020-08-27 RX ORDER — FLUTICASONE PROPIONATE 50 MCG
1 SPRAY, SUSPENSION (ML) NASAL DAILY
Status: DISCONTINUED | OUTPATIENT
Start: 2020-08-27 | End: 2020-08-28 | Stop reason: HOSPADM

## 2020-08-27 RX ORDER — SODIUM CHLORIDE 0.9 % (FLUSH) 0.9 %
10 SYRINGE (ML) INJECTION
Status: ACTIVE | OUTPATIENT
Start: 2020-08-27 | End: 2020-08-27

## 2020-08-27 RX ORDER — DIVALPROEX SODIUM 250 MG/1
500 TABLET, DELAYED RELEASE ORAL NIGHTLY
Status: DISCONTINUED | OUTPATIENT
Start: 2020-08-27 | End: 2020-08-28 | Stop reason: HOSPADM

## 2020-08-27 RX ORDER — FOLIC ACID 0.8 MG
500 TABLET ORAL 2 TIMES DAILY
COMMUNITY
End: 2020-10-19

## 2020-08-27 RX ORDER — ATORVASTATIN CALCIUM 80 MG/1
80 TABLET, FILM COATED ORAL NIGHTLY
Status: DISCONTINUED | OUTPATIENT
Start: 2020-08-27 | End: 2020-08-28 | Stop reason: HOSPADM

## 2020-08-27 RX ORDER — INSULIN GLARGINE 100 [IU]/ML
6 INJECTION, SOLUTION SUBCUTANEOUS NIGHTLY
Status: DISCONTINUED | OUTPATIENT
Start: 2020-08-27 | End: 2020-08-28 | Stop reason: HOSPADM

## 2020-08-27 RX ORDER — TIZANIDINE 2 MG/1
2 TABLET ORAL NIGHTLY
COMMUNITY
End: 2020-10-15

## 2020-08-27 RX ORDER — ASPIRIN 300 MG/1
300 SUPPOSITORY RECTAL DAILY
Status: DISCONTINUED | OUTPATIENT
Start: 2020-08-27 | End: 2020-08-28 | Stop reason: HOSPADM

## 2020-08-27 RX ORDER — PROMETHAZINE HYDROCHLORIDE 25 MG/1
12.5 TABLET ORAL EVERY 6 HOURS PRN
Status: DISCONTINUED | OUTPATIENT
Start: 2020-08-27 | End: 2020-08-28 | Stop reason: HOSPADM

## 2020-08-27 RX ORDER — NICOTINE POLACRILEX 4 MG
15 LOZENGE BUCCAL PRN
Status: DISCONTINUED | OUTPATIENT
Start: 2020-08-27 | End: 2020-08-28 | Stop reason: HOSPADM

## 2020-08-27 RX ORDER — KETOROLAC TROMETHAMINE 30 MG/ML
15 INJECTION, SOLUTION INTRAMUSCULAR; INTRAVENOUS ONCE
Status: COMPLETED | OUTPATIENT
Start: 2020-08-27 | End: 2020-08-27

## 2020-08-27 RX ORDER — POLYETHYLENE GLYCOL 3350 17 G/17G
17 POWDER, FOR SOLUTION ORAL DAILY
Status: DISCONTINUED | OUTPATIENT
Start: 2020-08-27 | End: 2020-08-28 | Stop reason: HOSPADM

## 2020-08-27 RX ORDER — FOLIC ACID 1 MG/1
1 TABLET ORAL
Status: DISCONTINUED | OUTPATIENT
Start: 2020-08-27 | End: 2020-08-28 | Stop reason: HOSPADM

## 2020-08-27 RX ORDER — ACETAMINOPHEN 325 MG/1
650 TABLET ORAL EVERY 6 HOURS PRN
Status: ON HOLD | COMMUNITY
End: 2021-07-30 | Stop reason: HOSPADM

## 2020-08-27 RX ORDER — BUDESONIDE AND FORMOTEROL FUMARATE DIHYDRATE 160; 4.5 UG/1; UG/1
2 AEROSOL RESPIRATORY (INHALATION) 2 TIMES DAILY
Status: DISCONTINUED | OUTPATIENT
Start: 2020-08-27 | End: 2020-08-27 | Stop reason: CLARIF

## 2020-08-27 RX ORDER — ASPIRIN 81 MG/1
TABLET, CHEWABLE ORAL
Status: DISPENSED
Start: 2020-08-27 | End: 2020-08-27

## 2020-08-27 RX ORDER — SPIRONOLACTONE 25 MG/1
50 TABLET ORAL DAILY
Status: DISCONTINUED | OUTPATIENT
Start: 2020-08-27 | End: 2020-08-27

## 2020-08-27 RX ORDER — INSULIN GLARGINE 100 [IU]/ML
10 INJECTION, SOLUTION SUBCUTANEOUS NIGHTLY
Status: DISCONTINUED | OUTPATIENT
Start: 2020-08-27 | End: 2020-08-27

## 2020-08-27 RX ORDER — SODIUM CHLORIDE 0.9 % (FLUSH) 0.9 %
10 SYRINGE (ML) INJECTION PRN
Status: DISCONTINUED | OUTPATIENT
Start: 2020-08-27 | End: 2020-08-27

## 2020-08-27 RX ORDER — TETRAHYDROZOLINE HCL 0.05 %
2 DROPS OPHTHALMIC (EYE) 3 TIMES DAILY
COMMUNITY
End: 2020-10-15

## 2020-08-27 RX ORDER — CLOTRIMAZOLE 1 %
CREAM (GRAM) TOPICAL 2 TIMES DAILY PRN
COMMUNITY
End: 2021-02-25

## 2020-08-27 RX ORDER — TIZANIDINE 4 MG/1
2 TABLET ORAL NIGHTLY
Status: DISCONTINUED | OUTPATIENT
Start: 2020-08-27 | End: 2020-08-27 | Stop reason: SDUPTHER

## 2020-08-27 RX ORDER — AZELASTINE 1 MG/ML
1 SPRAY, METERED NASAL 2 TIMES DAILY
Status: DISCONTINUED | OUTPATIENT
Start: 2020-08-27 | End: 2020-08-27 | Stop reason: CLARIF

## 2020-08-27 RX ORDER — DIPHENHYDRAMINE HYDROCHLORIDE 50 MG/ML
25 INJECTION INTRAMUSCULAR; INTRAVENOUS ONCE
Status: COMPLETED | OUTPATIENT
Start: 2020-08-27 | End: 2020-08-27

## 2020-08-27 RX ORDER — TETRAHYDROZOLINE HCL 0.05 %
2 DROPS OPHTHALMIC (EYE) 3 TIMES DAILY
Status: DISCONTINUED | OUTPATIENT
Start: 2020-08-27 | End: 2020-08-28 | Stop reason: HOSPADM

## 2020-08-27 RX ORDER — TIZANIDINE 4 MG/1
2 TABLET ORAL 2 TIMES DAILY
Status: DISCONTINUED | OUTPATIENT
Start: 2020-08-27 | End: 2020-08-28 | Stop reason: HOSPADM

## 2020-08-27 RX ORDER — MAGNESIUM GLUCONATE 27 MG(500)
500 TABLET ORAL 2 TIMES DAILY
Status: DISCONTINUED | OUTPATIENT
Start: 2020-08-27 | End: 2020-08-28 | Stop reason: HOSPADM

## 2020-08-27 RX ORDER — POLYETHYLENE GLYCOL 3350 17 G/17G
17 POWDER, FOR SOLUTION ORAL DAILY PRN
Status: DISCONTINUED | OUTPATIENT
Start: 2020-08-27 | End: 2020-08-27 | Stop reason: SDUPTHER

## 2020-08-27 RX ORDER — DEXTROSE MONOHYDRATE 50 MG/ML
100 INJECTION, SOLUTION INTRAVENOUS PRN
Status: DISCONTINUED | OUTPATIENT
Start: 2020-08-27 | End: 2020-08-28 | Stop reason: HOSPADM

## 2020-08-27 RX ORDER — PROMETHAZINE HYDROCHLORIDE 25 MG/1
12.5 TABLET ORAL EVERY 6 HOURS PRN
Status: DISCONTINUED | OUTPATIENT
Start: 2020-08-27 | End: 2020-08-27 | Stop reason: SDUPTHER

## 2020-08-27 RX ORDER — METHOCARBAMOL 500 MG/1
750 TABLET, FILM COATED ORAL 3 TIMES DAILY
Status: DISCONTINUED | OUTPATIENT
Start: 2020-08-27 | End: 2020-08-27

## 2020-08-27 RX ORDER — LEVOTHYROXINE SODIUM 112 UG/1
112 TABLET ORAL DAILY
Status: DISCONTINUED | OUTPATIENT
Start: 2020-08-27 | End: 2020-08-27

## 2020-08-27 RX ORDER — PROMETHAZINE HYDROCHLORIDE 25 MG/1
25 TABLET ORAL EVERY 6 HOURS PRN
COMMUNITY

## 2020-08-27 RX ORDER — MONTELUKAST SODIUM 10 MG/1
10 TABLET ORAL NIGHTLY
Status: DISCONTINUED | OUTPATIENT
Start: 2020-08-27 | End: 2020-08-28 | Stop reason: HOSPADM

## 2020-08-27 RX ORDER — LEVOTHYROXINE SODIUM 0.12 MG/1
125 TABLET ORAL
Status: DISCONTINUED | OUTPATIENT
Start: 2020-08-28 | End: 2020-08-28 | Stop reason: HOSPADM

## 2020-08-27 RX ORDER — ARFORMOTEROL TARTRATE 15 UG/2ML
15 SOLUTION RESPIRATORY (INHALATION) 2 TIMES DAILY
Status: DISCONTINUED | OUTPATIENT
Start: 2020-08-27 | End: 2020-08-28 | Stop reason: HOSPADM

## 2020-08-27 RX ORDER — ASPIRIN 81 MG/1
81 TABLET ORAL DAILY
Status: DISCONTINUED | OUTPATIENT
Start: 2020-08-27 | End: 2020-08-28 | Stop reason: HOSPADM

## 2020-08-27 RX ORDER — POLYETHYLENE GLYCOL 3350 17 G/17G
17 POWDER, FOR SOLUTION ORAL DAILY PRN
Status: DISCONTINUED | OUTPATIENT
Start: 2020-08-27 | End: 2020-08-28 | Stop reason: HOSPADM

## 2020-08-27 RX ORDER — LEVOTHYROXINE SODIUM 0.1 MG/1
100 TABLET ORAL
Status: DISCONTINUED | OUTPATIENT
Start: 2020-08-29 | End: 2020-08-28 | Stop reason: HOSPADM

## 2020-08-27 RX ORDER — DIVALPROEX SODIUM 250 MG/1
250 TABLET, DELAYED RELEASE ORAL
Status: DISCONTINUED | OUTPATIENT
Start: 2020-08-27 | End: 2020-08-28 | Stop reason: HOSPADM

## 2020-08-27 RX ORDER — LEVOTHYROXINE SODIUM 0.05 MG/1
100 TABLET ORAL
COMMUNITY
End: 2020-09-01 | Stop reason: SDUPTHER

## 2020-08-27 RX ADMIN — ROPINIROLE HYDROCHLORIDE 0.5 MG: 0.5 TABLET, FILM COATED ORAL at 16:25

## 2020-08-27 RX ADMIN — MONTELUKAST 10 MG: 10 TABLET, FILM COATED ORAL at 21:37

## 2020-08-27 RX ADMIN — POTASSIUM BICARBONATE 20 MEQ: 782 TABLET, EFFERVESCENT ORAL at 11:51

## 2020-08-27 RX ADMIN — IPRATROPIUM BROMIDE AND ALBUTEROL SULFATE 3 ML: .5; 3 SOLUTION RESPIRATORY (INHALATION) at 12:12

## 2020-08-27 RX ADMIN — FUROSEMIDE 20 MG: 20 TABLET ORAL at 11:51

## 2020-08-27 RX ADMIN — TIZANIDINE 2 MG: 4 TABLET ORAL at 21:05

## 2020-08-27 RX ADMIN — Medication 500 MG: at 21:05

## 2020-08-27 RX ADMIN — ARFORMOTEROL TARTRATE 15 MCG: 15 SOLUTION RESPIRATORY (INHALATION) at 21:39

## 2020-08-27 RX ADMIN — FLUTICASONE PROPIONATE 1 SPRAY: 50 SPRAY, METERED NASAL at 16:26

## 2020-08-27 RX ADMIN — CLONAZEPAM 0.5 MG: 0.5 TABLET ORAL at 11:51

## 2020-08-27 RX ADMIN — Medication 10 ML: at 11:44

## 2020-08-27 RX ADMIN — FOLIC ACID 1 MG: 1 TABLET ORAL at 16:27

## 2020-08-27 RX ADMIN — IPRATROPIUM BROMIDE AND ALBUTEROL SULFATE 3 ML: .5; 3 SOLUTION RESPIRATORY (INHALATION) at 17:39

## 2020-08-27 RX ADMIN — DIVALPROEX SODIUM 250 MG: 250 TABLET, DELAYED RELEASE ORAL at 18:02

## 2020-08-27 RX ADMIN — SODIUM CHLORIDE, PRESERVATIVE FREE 10 ML: 5 INJECTION INTRAVENOUS at 21:06

## 2020-08-27 RX ADMIN — IOPAMIDOL 80 ML: 755 INJECTION, SOLUTION INTRAVENOUS at 04:36

## 2020-08-27 RX ADMIN — ROPINIROLE HYDROCHLORIDE 0.5 MG: 0.5 TABLET, FILM COATED ORAL at 21:04

## 2020-08-27 RX ADMIN — ENOXAPARIN SODIUM 40 MG: 40 INJECTION SUBCUTANEOUS at 16:25

## 2020-08-27 RX ADMIN — PROPRANOLOL HYDROCHLORIDE 10 MG: 10 TABLET ORAL at 21:37

## 2020-08-27 RX ADMIN — Medication 2 DROP: at 16:26

## 2020-08-27 RX ADMIN — IPRATROPIUM BROMIDE AND ALBUTEROL SULFATE 3 ML: .5; 3 SOLUTION RESPIRATORY (INHALATION) at 21:39

## 2020-08-27 RX ADMIN — Medication 2 DROP: at 21:07

## 2020-08-27 RX ADMIN — ATORVASTATIN CALCIUM 80 MG: 80 TABLET, FILM COATED ORAL at 21:02

## 2020-08-27 RX ADMIN — BUDESONIDE 500 MCG: 0.5 INHALANT RESPIRATORY (INHALATION) at 21:39

## 2020-08-27 RX ADMIN — INSULIN GLARGINE 6 UNITS: 100 INJECTION, SOLUTION SUBCUTANEOUS at 21:08

## 2020-08-27 RX ADMIN — LEVOMILNACIPRAN HYDROCHLORIDE 40 MG: 20 CAPSULE, EXTENDED RELEASE ORAL at 16:25

## 2020-08-27 RX ADMIN — DIPHENHYDRAMINE HYDROCHLORIDE 25 MG: 50 INJECTION, SOLUTION INTRAMUSCULAR; INTRAVENOUS at 21:05

## 2020-08-27 RX ADMIN — ASPIRIN 81 MG: 81 TABLET ORAL at 11:52

## 2020-08-27 RX ADMIN — KETOROLAC TROMETHAMINE 15 MG: 30 INJECTION, SOLUTION INTRAMUSCULAR at 21:05

## 2020-08-27 RX ADMIN — DIVALPROEX SODIUM 500 MG: 250 TABLET, DELAYED RELEASE ORAL at 21:36

## 2020-08-27 ASSESSMENT — PAIN DESCRIPTION - LOCATION
LOCATION: HEAD
LOCATION: HEAD

## 2020-08-27 ASSESSMENT — PAIN DESCRIPTION - ONSET: ONSET: ON-GOING

## 2020-08-27 ASSESSMENT — PAIN SCALES - GENERAL
PAINLEVEL_OUTOF10: 8
PAINLEVEL_OUTOF10: 4
PAINLEVEL_OUTOF10: 3

## 2020-08-27 ASSESSMENT — PAIN DESCRIPTION - PAIN TYPE
TYPE: ACUTE PAIN
TYPE: ACUTE PAIN

## 2020-08-27 ASSESSMENT — PAIN DESCRIPTION - DESCRIPTORS
DESCRIPTORS: HEADACHE
DESCRIPTORS: ACHING

## 2020-08-27 ASSESSMENT — PAIN DESCRIPTION - FREQUENCY: FREQUENCY: CONTINUOUS

## 2020-08-27 ASSESSMENT — PAIN DESCRIPTION - PROGRESSION: CLINICAL_PROGRESSION: GRADUALLY IMPROVING

## 2020-08-27 NOTE — CONSULTS
Micah Bustillo is a 45 y.o. female       Chief Complaint   Patient presents with    Consultation     pt sent from [facility] for possible stroke like symptoms following a migraine the last couple days. but tonight at 9pm she had a severe migraine and then developed right sided numbness and tingling which has since resolved only has headache at this time        HPI:    This is a 45year old woman with history of migraine headaches presenting with severe headache and right sided weakness and numbness. Headache started the day prior. She initially presented to outside hospital. Initial NIHSS was recorded as 6. She was transferred to our ED for further evaluation and possible neurointervention. Shortly after arrival in our ED her symptoms resolved. She has had previous elvira-weakness and numbness in the past but never this severe. MRI of the brain was normal.       HPI  Prior to Visit Medications    Medication Sig Taking? Authorizing Provider   potassium chloride (KLOR-CON) 20 MEQ packet Take 20 mEq by mouth daily  Vicenta Wallace DO   propranolol (INDERAL) 10 MG tablet Take 10 mg by mouth 3 times daily  Historical Provider, MD   levomilnacipran (FETZIMA) 40 MG CP24 extended release capsule Take 40 mg by mouth daily  Historical Provider, MD   pantoprazole sodium (PROTONIX) 40 MG PACK packet Take 40 mg by mouth every morning (before breakfast)  Historical Provider, MD   tiZANidine (ZANAFLEX) 2 MG tablet Take 1 tablet by mouth 2 times daily  Vicenta Wallace DO   Immune Globulin, Human, (GAMMAGARD) 30 GM/300ML SOLN Give 80 gm IVIg every 2 weeks and run each infusion slowly over 8 hours for 6 months till 08/30/2019. Premedicate 30 min prior: 20 mg IV Solumedrol, 25 mg PO Benadryl, 500 mg PO Tylenol, 500 cc saline pre- and 500 cc saline post infusion.  Dx: CVID (D83.9)  Historical Provider, MD   Galcanezumab-gnlm (EMGALITY) 120 MG/ML SOAJ Inject into the skin  Historical Provider, MD   clonazePAM (KLONOPIN) 0.5 MG tablet Take 0.5 mg by mouth 2 times daily. Ahsan Gonzalez Historical Provider, MD   divalproex (DEPAKOTE) 250 MG DR tablet TK 1 T PO BID AND 2 TS HS  Historical Provider, MD   albuterol sulfate  (90 Base) MCG/ACT inhaler Inhale 2 puffs into the lungs every 6 hours as needed for Wheezing  Historical Provider, MD   aprepitant (EMEND) 80 MG capsule Take 80 mg by mouth daily  Historical Provider, MD   calcium carbonate (OSCAL) 500 MG TABS tablet Take 500 mg by mouth daily  Historical Provider, MD   CPAP Machine MISC by Does not apply route  Historical Provider, MD   econazole nitrate 1 % cream Apply topically daily Apply topically daily.   Historical Provider, MD   EPINEPHrine (EPIPEN) 0.3 MG/0.3ML SOAJ injection Inject 0.3 mg into the muscle as needed Use as directed for allergic reaction  Historical Provider, MD   vitamin D (ERGOCALCIFEROL) 05351 units CAPS capsule Take 50,000 Units by mouth once a week  Historical Provider, MD   Ferrous Gluconate (IRON 27 PO) Take by mouth  Historical Provider, MD   fluticasone (FLONASE) 50 MCG/ACT nasal spray 1 spray by Nasal route daily  Historical Provider, MD   fluticasone-salmeterol (ADVAIR HFA) 230-21 MCG/ACT inhaler Inhale 2 puffs into the lungs 2 times daily  Historical Provider, MD   folic acid (FOLVITE) 1 MG tablet Take 1 mg by mouth daily  Historical Provider, MD   furosemide (LASIX) 20 MG tablet Take 20 mg by mouth daily  Historical Provider, MD   insulin lispro (HUMALOG KWIKPEN) 100 UNIT/ML pen Inject into the skin 3 times daily (before meals)  Historical Provider, MD   Immune Globulin, Human, 10 GM/100ML SOLN IV solution Infuse intravenously once a week  Historical Provider, MD   insulin glargine (LANTUS) 100 UNIT/ML injection vial Inject into the skin nightly  Historical Provider, MD   ipratropium-albuterol (DUONEB) 0.5-2.5 (3) MG/3ML SOLN nebulizer solution Inhale 1 vial into the lungs every 4 hours  Historical Provider, MD   levothyroxine (SYNTHROID) 112 MCG tablet Take 112 mcg by mouth Daily  Historical Provider, MD   AMYLASE-LIPASE-PROTEASE PO Take by mouth  Historical Provider, MD   magnesium oxide (MAG-OX) 400 MG tablet Take 400 mg by mouth daily  Historical Provider, MD   melatonin 3 MG TABS tablet Take 5 mg by mouth nightly as needed  Historical Provider, MD   methocarbamol (ROBAXIN) 750 MG tablet Take 750 mg by mouth 3 times daily  Historical Provider, MD   montelukast (SINGULAIR) 10 MG tablet Take 10 mg by mouth nightly  Historical Provider, MD   ondansetron (ZOFRAN-ODT) 4 MG disintegrating tablet Take 4 mg by mouth every 8 hours as needed for Nausea or Vomiting  Historical Provider, MD   polyethylene glycol (GLYCOLAX) powder Take 17 g by mouth daily  Historical Provider, MD   rOPINIRole (REQUIP) 0.5 MG tablet Take 0.5 mg by mouth 3 times daily  Historical Provider, MD   spironolactone (ALDACTONE) 50 MG tablet Take 50 mg by mouth daily  Historical Provider, MD   Tens Unit MISC by Does not apply route  Historical Provider, MD   polyethyl glycol-propyl glycol 0.4-0.3 % (SYSTANE) 0.4-0.3 % ophthalmic solution 1 drop as needed for Dry Eyes  Historical Provider, MD   diclofenac sodium (VOLTAREN) 1 % GEL Apply 4x daily as needed  Megan Vasquez PA-C     Social History     Tobacco Use    Smoking status: Never Smoker    Smokeless tobacco: Never Used   Substance Use Topics    Alcohol use: No    Drug use: No     Family History   Problem Relation Age of Onset    Stroke Sister     Osteoporosis Sister     Hypertension Brother      Past Surgical History:   Procedure Laterality Date    APPENDECTOMY      CHOLECYSTECTOMY      HYSTERECTOMY       Past Medical History:   Diagnosis Date    Asthma     CPAP (continuous positive airway pressure) dependence     Depression     Fibromyalgia     Headache     Hematuria     Immune deficiency disorder (HCC)     Rectal bleeding     Scleroderma ([facility] [address]      Review of Systems    As stated above all others negative         Objective:   /62 Comment: manual  Pulse 85   Temp 97.4 °F (36.3 °C)   Resp 16   Ht 5' 2\" (1.575 m)   Wt 178 lb (80.7 kg)   SpO2 100%   BMI 32.56 kg/m²     Physical Exam  HENT:      Head: Normocephalic and atraumatic. Mouth/Throat:      Mouth: Mucous membranes are moist.      Pharynx: Oropharynx is clear. Eyes:      General: Lids are normal.      Extraocular Movements: Extraocular movements intact. Conjunctiva/sclera: Conjunctivae normal.      Pupils: Pupils are equal, round, and reactive to light. Neck:      Musculoskeletal: Neck supple. Cardiovascular:      Rate and Rhythm: Normal rate and regular rhythm. Abdominal:      Palpations: Abdomen is soft. Neurological:      Mental Status: She is alert. Deep Tendon Reflexes: Strength normal.      Reflex Scores:       Tricep reflexes are 1+ on the right side and 1+ on the left side. Bicep reflexes are 1+ on the right side and 1+ on the left side. Patellar reflexes are 1+ on the right side and 1+ on the left side. Achilles reflexes are 1+ on the right side and 1+ on the left side. Psychiatric:         Mood and Affect: Mood normal.         Speech: Speech normal.                 Neurological Exam  Mental Status  Alert. Oriented to person, place, time and situation. Recent and remote memory are intact. Speech is normal. Language is fluent with no aphasia. Attention and concentration are normal.    Cranial Nerves  CN II: Visual fields full to confrontation. CN III, IV, VI: Extraocular movements intact bilaterally. Normal lids and orbits bilaterally. Pupils equal round and reactive to light bilaterally. CN V:  Right: Diminished sensation of the entire right side of the face. CN VII: Full and symmetric facial movement. CN VIII: Hearing is normal.  CN IX, X: Palate elevates symmetrically. Normal gag reflex. CN XI: Shoulder shrug strength is normal.  CN XII: Tongue midline without atrophy or fasciculations.     Motor  Normal muscle bulk throughout. Normal muscle tone. No abnormal involuntary movements. Strength is 5/5 throughout all four extremities. Sensory  Right hemisensory loss. Mild per pt. Reflexes                                           Right                      Left  Biceps                                 1+                         1+  Triceps                                1+                         1+  Patellar                                1+                         1+  Achilles                                1+                         1+    Coordination  Right: Finger-to-nose normal. Heel-to-shin normal.  Left: Finger-to-nose normal. Heel-to-shin normal.      Laboratory/Radiology:     CBC with Differential:    Lab Results   Component Value Date    WBC 5.2 08/28/2020    RBC 4.34 08/28/2020    RBC 4.31 03/11/2020    HGB 12.9 08/28/2020    HCT 39.6 08/28/2020     08/28/2020    MCV 91.2 08/28/2020    MCH 29.7 08/28/2020    MCHC 32.6 08/28/2020    RDW 12.4 08/28/2020    SEGSPCT 52.9 08/26/2020    LYMPHOPCT 49.3 08/28/2020    LYMPHOPCT 29.7 03/11/2020    MONOPCT 6.4 08/28/2020    BASOPCT 0.8 08/28/2020    MONOSABS 0.33 08/28/2020    LYMPHSABS 2.55 08/28/2020    EOSABS 0.09 08/28/2020    BASOSABS 0.04 08/28/2020     CMP:    Lab Results   Component Value Date     08/28/2020    K 3.8 08/28/2020    CL 99 08/28/2020    CO2 26 08/28/2020    BUN 18 08/28/2020    CREATININE 0.7 08/28/2020    GFRAA >60 08/28/2020    AGRATIO 1.5 08/26/2020    LABGLOM >60 08/28/2020    GLUCOSE 122 08/28/2020    PROT 6.1 08/28/2020    LABALBU 3.8 08/28/2020    CALCIUM 9.0 08/28/2020    BILITOT 0.8 08/28/2020    ALKPHOS 45 08/28/2020    AST 16 08/28/2020    ALT 19 08/28/2020     HgBA1c:    Lab Results   Component Value Date    LABA1C 7.0 08/28/2020     TSH:  No results found for: TSH  VITAMIN B12: No components found for: B12    Mri brain negative. I independently reviewed the labs and imaging studies at today's appointment.      Assessment: Complex/ hemiplegic migraine. Now resolved. Patient Active Problem List   Diagnosis    Vision loss, bilateral    Type 2 diabetes mellitus without complication, with long-term current use of insulin (HCC)    Thyroid nodule    Solitary cyst of right breast    Sjogren's syndrome with keratoconjunctivitis sicca ([address]    Retention of urine    Restless legs    Raynaud's phenomenon without gangrene    Primary fibromyalgia syndrome    Postural orthostatic tachycardia syndrome    PAULINE on CPAP    Obesity, Class II, BMI 35-39.9    Other and unspecified hyperlipidemia    Pharyngoesophageal dysphagia    Lung nodule    Long term current use of insulin (HCC)    Insomnia    Intractable chronic migraine without aura and without status migrainosus    Hypothyroidism    Hypogammaglobulinemia ([address]    History of renal calculi    Gastroparesis    Gastroesophageal reflux disease    Essential hypertension    DDD (degenerative disc disease)    CVID (common variable immunodeficiency) (HCC)    Migraine    Irritable bowel syndrome    Constipation    Diarrhea    Benign intracranial hypertension    Depression    Anxiety    Anemia    Polycystic disease, ovaries    Cervicalgia    Asthma    Connective tissue disorder (HCC)    Stroke-like symptoms       Plan:     Close follow up with outpatient neurology. Please call with further questions. OK for discharge from my standpoint.        Joseph Nazario  12:08 PM  8/27/2020

## 2020-08-27 NOTE — ED NOTES
[facility] [address] [facility] Prerna Flores  08/27/20 1147

## 2020-08-27 NOTE — ED NOTES
Radiology Procedure Waiver   Name: Bibi Current  : 1975  MRN: [id number]    Date:  20    Time: 2:35 AM EDT    Benefits of immediately proceeding with Radiology exam(s) without pre-testing outweigh the risks or are not indicated as specified below and therefore the following is/are being waived:    [x] Pregnancy test   [] Patients LMP on-time and regular.   [] Patient had Tubal Ligation or has other Contraception Device. [] Patient  is Menopausal or Premenarcheal.    [] Patient had Full or Partial Hysterectomy. [] Protocol for Iodine allergy    [] MRI Questionnaire     [x] BUN/Creatinine   [] Patient age w/no hx of renal dysfunction. [] Patient on Dialysis. [] Recent Normal Labs.   Electronically signed by Garcia Farmer MD on 20 at 2:35 AM EDT                 Garcia Farmer MD  [address] MD Benji  20 [id number]

## 2020-08-27 NOTE — ED PROVIDER NOTES
HPI:  8/27/20, Time: 2:36 AM NGAT         Rios Wang is a 45 y.o. female presenting to the ED for history of weakness on right side, beginning 9pm ago. The complaint has been persistent, moderate in severity, and worsened by nothing. Patient reporting headache. Patient reporting headache started around 9 PM.  She reported weakness on the right side her stroke scale was above 5 at outlying facility she was sent here for further evaluation patient was still symptomatic at outlying facility. Patient does have history of diabetes history of migraines. Patient reports she had a similar episode but not this bad in 2014 what they thought was related to a migraine headache. Patient reporting no speech difficulty no new visual changes. 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 Patient reporting no abdominal pain or vomiting there is no diarrhea. Patient reporting still having a headache she rates it a 4 out of 10. ROS:   Pertinent positives and negatives are stated within HPI, all other systems reviewed and are negative.  --------------------------------------------- PAST HISTORY ---------------------------------------------  Past Medical History:  has a past medical history of Asthma, CPAP (continuous positive airway pressure) dependence, Depression, Fibromyalgia, Headache, Hematuria, Immune deficiency disorder ([facility] [address] Rectal bleeding, and Scleroderma ([facility] [address] Past Surgical History:  has a past surgical history that includes Hysterectomy; Cholecystectomy; and Appendectomy. Social History:  reports that she has never smoked. She has never used smokeless tobacco. She reports that she does not drink alcohol or use drugs. Family History: family history includes Hypertension in her brother; Osteoporosis in her sister; Stroke in her sister. The patients home medications have been reviewed. Allergies: Abilify [aripiprazole];  Adhesive tape; Aspartame and phenylalanine; Cymbalta [duloxetine hcl]; Darvon [propoxyphene]; Diamox [acetazolamide]; Dilaudid [hydromorphone hcl]; Doxepin; Doxycycline; Effexor [venlafaxine]; Grapeseed extract [nutritional supplements]; Hydrochlorothiazide; Iron; Levaquin [levofloxacin in d5w]; Other; Pecan nut (diagnostic); Reglan [metoclopramide]; Rocephin [ceftriaxone]; Rosemary oil; Sulfa antibiotics; Tetracyclines & related; Topamax [topiramate]; Tramadol; Triptans; Tylenol [acetaminophen]; Reji Silvio nut oil]; and Zithromax [azithromycin]    ---------------------------------------------------PHYSICAL EXAM--------------------------------------    Constitutional/General: Alert and oriented x3, well appearing, non toxic in NAD  Head: Normocephalic and atraumatic  Eyes: PERRL, EOMI  Mouth: Oropharynx clear, handling secretions, no trismus  Neck: Supple, full ROM, non tender to palpation in the midline, no stridor, no crepitus, no meningeal signs  Pulmonary: Lungs clear to auscultation bilaterally, no wheezes, rales, or rhonchi. Not in respiratory distress  Cardiovascular:  Regular rate. Regular rhythm. No murmurs, gallops, or rubs. 2+ distal pulses  Chest: no chest wall tenderness  Abdomen: Soft. Non tender. Non distended. +BS. No rebound, guarding, or rigidity. No pulsatile masses appreciated. Musculoskeletal: Moves all extremities x 4. Warm and well perfused, no clubbing, cyanosis, or edema. Capillary refill <3 seconds  Skin: warm and dry. No rashes. Neurologic: GCS 15, CN 2-12 grossly intact, no focal deficits, symmetric strength 5/5 in the upper and lower extremities bilaterally  Psych: Normal Affect  NIH Stroke Scale at time of initial evaluation:  1A: Level of Consciousness 0 - alert; keenly responsive   1B: Ask Month and Age 0 - answers both questions correctly   1C:  Tell Patient To Open and Close Eyes, then Hand  Squeeze 0 - performs both tasks correctly   2: Test Horizontal Extraocular Movements 0 - normal   3: Test Visual Fields 0 - no visual loss   4: 0.05 - 0.50 E9/L    Basophils Absolute 0.04 0.00 - 0.20 E9/L   Comprehensive Metabolic Panel   Result Value Ref Range    Sodium 136 132 - 146 mmol/L    Potassium 4.0 3.5 - 5.0 mmol/L    Chloride 102 98 - 107 mmol/L    CO2 25 22 - 29 mmol/L    Anion Gap 9 7 - 16 mmol/L    Glucose 186 (H) 74 - 99 mg/dL    BUN 10 6 - 20 mg/dL    CREATININE 0.6 0.5 - 1.0 mg/dL    GFR Non-African American >60 >=60 mL/min/1.73    GFR African American >60     Calcium 9.0 8.6 - 10.2 mg/dL    Total Protein 6.2 (L) 6.4 - 8.3 g/dL    Alb 4.0 3.5 - 5.2 g/dL    Total Bilirubin 0.2 0.0 - 1.2 mg/dL    Alkaline Phosphatase 50 35 - 104 U/L    ALT 19 0 - 32 U/L    AST 16 0 - 31 U/L       RADIOLOGY:  Interpreted by Radiologist.  CTA HEAD W CONTRAST   Final Result   1. No significant arterial inflow disease in the neck. 2. Intracranially, no evidence of large vessel occlusion, aneurysm or   vascular malformation. 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 CTA NECK W CONTRAST   Final Result   1. No significant arterial inflow disease in the neck. 2. Intracranially, no evidence of large vessel occlusion, aneurysm or   vascular malformation. 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 EKG:  This EKG is signed and interpreted by me. Rate: 81  Rhythm: Sinus  Interpretation: non-specific EKG  Comparison: stable as compared to patient's most recent EKG      ------------------------- NURSING NOTES AND VITALS REVIEWED ---------------------------   The nursing notes within the ED encounter and vital signs as below have been reviewed by myself. BP (!) 104/58   Pulse 66   Temp 96.5 °F (35.8 °C) (Temporal)   Resp 16   Ht 5' 2\" (1.575 m)   Wt 178 lb (80.7 kg)   SpO2 98%   BMI 32.56 kg/m²   Oxygen Saturation Interpretation: Normal    The patients available past medical records and past encounters were reviewed.         ------------------------------ ED COURSE/MEDICAL DECISION MAKING----------------------  Medications   sodium chloride flush 0.9 % injection 10 mL (has no administration in time range)   iopamidol (ISOVUE-370) 76 % injection 110 mL (80 mLs Intravenous Given 8/27/20 8396)             Medical Decision Making:        Re-Evaluations:             Patient rechecked and improving. Vital signs stable. Patient having no chest pain. Patient still reporting some headache. Patient has no appreciable weakness. Consultations:               Internal medicine  Critical Care: This patient's ED course included: a personal history and physicial eaxmination    This patient has been closely monitored during their ED course. Counseling: The emergency provider has spoken with the patient and discussed todays results, in addition to providing specific details for the plan of care and counseling regarding the diagnosis and prognosis. Questions are answered at this time and they are agreeable with the plan.       --------------------------------- IMPRESSION AND DISPOSITION ---------------------------------    IMPRESSION  1. Acute right-sided weakness        DISPOSITION  Disposition: Admit to telemetry  Patient condition is stable        NOTE: This report was transcribed using voice recognition software.  Every effort was made to ensure accuracy; however, inadvertent computerized transcription errors may be present          Ap Porter MD  08/27/20 2300 Corazon Lira MD  08/27/20 2528

## 2020-08-27 NOTE — H&P
Hospital Medicine History & Physical      PCP: Denman Skiff, DO    Date of Admission: 8/27/2020    Date of Service: Pt seen/examined on 8/27/20 Placed in Observation. Chief Complaint: right side weakness      History Of Present Illness:  (45 y.o. female who presented to [address] [place] [address] with right side weakness. History obtained from the patient. 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 She states she does get hemiplegia headaches with similar symptoms at times but this was different in that she was unable to stand on her right leg. She did go to [facility] initially and was transferred to [facility] for neurology services. She was in [facility] two days ago for a migraine headache and treated with a new medication, she has also started back on her IVIG recently and this has been causing headache too. She states the weakness has improved but now has numbness in her extremities. She has PMH of asthma, depression, scleroderma, PAULINE, hypothyroid, HLD    Past Medical History:          Diagnosis Date    Asthma     CPAP (continuous positive airway pressure) dependence     Depression     Fibromyalgia     Headache     Hematuria     Immune deficiency disorder (HCC)     Rectal bleeding     Scleroderma (HCC)        Past Surgical History:          Procedure Laterality Date    APPENDECTOMY      CHOLECYSTECTOMY      HYSTERECTOMY         Medications Prior to Admission:      Prior to Admission medications    Medication Sig Start Date End Date Taking?  Authorizing Provider   potassium chloride (KLOR-CON) 20 MEQ packet Take 20 mEq by mouth daily 8/19/20 11/17/20  Vicenta Wallace DO   propranolol (INDERAL) 10 MG tablet Take 10 mg by mouth 3 times daily    Historical Provider, MD   levomilnacipran (FETZIMA) 40 MG CP24 extended release capsule Take 40 mg by mouth daily    Historical Provider, MD pantoprazole sodium (PROTONIX) 40 MG PACK packet Take 40 mg by mouth every morning (before breakfast)    Historical Provider, MD   tiZANidine (ZANAFLEX) 2 MG tablet Take 1 tablet by mouth 2 times daily 8/11/20 11/9/20  Vicenta Wallace DO   Immune Globulin, Human, (GAMMAGARD) 30 GM/300ML SOLN Give 80 gm IVIg every 2 weeks and run each infusion slowly over 8 hours for 6 months till 08/30/2019. Premedicate 30 min prior: 20 mg IV Solumedrol, 25 mg PO Benadryl, 500 mg PO Tylenol, 500 cc saline pre- and 500 cc saline post infusion. Dx: CVID (D83.9) 2/8/19   Historical Provider, MD   Galcanezumab-[facility]) 120 MG/ML SOAJ Inject into the skin 11/18/19   Historical Provider, MD   clonazePAM (KLONOPIN) 0.5 MG tablet Take 0.5 mg by mouth 2 times daily. Saleem Blanc Historical Provider, MD   divalproex (DEPAKOTE) 250 MG DR tablet TK 1 T PO BID AND 2 TS HS 4/16/17   Historical Provider, MD   albuterol sulfate  (90 Base) MCG/ACT inhaler Inhale 2 puffs into the lungs every 6 hours as needed for Wheezing    Historical Provider, MD   aprepitant (EMEND) 80 MG capsule Take 80 mg by mouth daily    Historical Provider, MD   calcium carbonate (OSCAL) 500 MG TABS tablet Take 500 mg by mouth daily    Historical Provider, MD   CPAP Machine MISC by Does not apply route    Historical Provider, MD   econazole nitrate 1 % cream Apply topically daily Apply topically daily.     Historical Provider, MD   EPINEPHrine (EPIPEN) 0.3 MG/0.3ML SOAJ injection Inject 0.3 mg into the muscle as needed Use as directed for allergic reaction    Historical Provider, MD   vitamin D (ERGOCALCIFEROL) 96736 units CAPS capsule Take 50,000 Units by mouth once a week    Historical Provider, MD   Ferrous Gluconate (IRON 27 PO) Take by mouth    Historical Provider, MD   fluticasone (FLONASE) 50 MCG/ACT nasal spray 1 spray by Nasal route daily    Historical Provider, MD   fluticasone-salmeterol (ADVAIR HFA) 230-21 MCG/ACT inhaler Inhale 2 puffs into the lungs 2 times daily    Historical Provider, MD   folic acid (FOLVITE) 1 MG tablet Take 1 mg by mouth daily    Historical Provider, MD   furosemide (LASIX) 20 MG tablet Take 20 mg by mouth daily    Historical Provider, MD   insulin lispro (HUMALOG KWIKPEN) 100 UNIT/ML pen Inject into the skin 3 times daily (before meals)    Historical Provider, MD   Immune Globulin, Human, 10 GM/100ML SOLN IV solution Infuse intravenously once a week    Historical Provider, MD   insulin glargine (LANTUS) 100 UNIT/ML injection vial Inject into the skin nightly    Historical Provider, MD   ipratropium-albuterol (DUONEB) 0.5-2.5 (3) MG/3ML SOLN nebulizer solution Inhale 1 vial into the lungs every 4 hours    Historical Provider, MD   levothyroxine (SYNTHROID) 112 MCG tablet Take 112 mcg by mouth Daily    Historical Provider, MD   AMYLASE-LIPASE-PROTEASE PO Take by mouth    Historical Provider, MD   magnesium oxide (MAG-OX) 400 MG tablet Take 400 mg by mouth daily    Historical Provider, MD   melatonin 3 MG TABS tablet Take 5 mg by mouth nightly as needed    Historical Provider, MD   methocarbamol (ROBAXIN) 750 MG tablet Take 750 mg by mouth 3 times daily    Historical Provider, MD   montelukast (SINGULAIR) 10 MG tablet Take 10 mg by mouth nightly    Historical Provider, MD   ondansetron (ZOFRAN-ODT) 4 MG disintegrating tablet Take 4 mg by mouth every 8 hours as needed for Nausea or Vomiting    Historical Provider, MD   polyethylene glycol (GLYCOLAX) powder Take 17 g by mouth daily    Historical Provider, MD   rOPINIRole (REQUIP) 0.5 MG tablet Take 0.5 mg by mouth 3 times daily    Historical Provider, MD   spironolactone (ALDACTONE) 50 MG tablet Take 50 mg by mouth daily    Historical Provider, MD   Tens Unit MISC by Does not apply route    Historical Provider, MD   polyethyl glycol-propyl glycol 0.4-0.3 % (SYSTANE) 0.4-0.3 % ophthalmic solution 1 drop as needed for Dry Eyes    Historical Provider, MD   diclofenac sodium (VOLTAREN) 1 % GEL Apply Full range of motion without deformity. Skin: Skin color, texture, turgor normal.  No rashes or lesions. Neurologic:  Weakness right side  Psychiatric:  Alert and oriented, thought content appropriate, normal insight  Capillary Refill: Brisk,< 3 seconds   Peripheral Pulses: +2 palpable, equal bilaterally         EKG:  I have reviewed the EKG with the following interpretation: NSR    Labs:     Recent Labs     08/26/20 2224 08/27/20 0253   WBC 6.0 6.0   HGB 13.0 12.5   HCT 38.8 38.7    206     Recent Labs     08/26/20 2224 08/27/20 0253    136   K 4.2 4.0    102   CO2 25 25   BUN 11 10   CREATININE 0.6 0.6   CALCIUM 8.7 9.0     Recent Labs     08/26/20 2224 08/27/20 0253   AST 19 16   ALT 23 19   BILITOT 0.6 0.2   ALKPHOS 46 50     Recent Labs     08/26/20 2224   INR 1.01     No results for input(s): Geraldine Rowe in the last 72 hours. Urinalysis:      Lab Results   Component Value Date    NITRU Negative 08/06/2018    BLOODU Negative 08/06/2018    SPECGRAV 1.020 08/06/2018    GLUCOSEU 100 08/06/2018       CTA HEAD W CONTRAST   Final Result   1. No significant arterial inflow disease in the neck. 2. Intracranially, no evidence of large vessel occlusion, aneurysm or   vascular malformation. 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 CTA NECK W CONTRAST   Final Result   1. No significant arterial inflow disease in the neck. 2. Intracranially, no evidence of large vessel occlusion, aneurysm or   vascular malformation. 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          MRI BRAIN WO CONTRAST    (Results Pending)       ASSESSMENT:    Active Hospital Problems    Diagnosis Date Noted    Stroke-like symptoms [R29.90] 08/27/2020    Sjogren's syndrome with keratoconjunctivitis sicca ([facility] [address] [M35.01] 10/09/2018    Type 2 diabetes mellitus without complication, with long-term current use of insulin (HCC) [E11.9, Z79.4] 04/13/2018    PAULINE on CPAP [G47.33, Z99.89] 11/14/2017    Raynaud's phenomenon without gangrene [I73.00] 10/22/2017    Hypothyroidism [E03.9] 08/10/2015       PLAN:  Resume home medications  Monitor BGL  Glucose control-insulin sliding scale, lantus  ASA  Statin  Monitor labs  Follow up MRI  Consult neurology  Neurovascular checks  Continue home medications        DVT Prophylaxis: lovenox  Diet: No diet orders on file  Code Status: No Order    PT/OT Eval Status: ordered    Dispo - observation       Khalida Diaz CNP    Thank you Katerina Franco DO for the opportunity to be involved in this patient's care. If you have any questions or concerns please feel free to contact me via [address] [place] [address].

## 2020-08-27 NOTE — PROGRESS NOTES
Pharmacy Note    Jada John was ordered azelastine (Astelin) nasal spray. Per the [address], this medication is non-formulary and not stocked by pharmacy for the reason indicated below. The medication can be reordered at discharge.      Medications in which risks outweigh benefits during hospitalization:         -  oral bisphosphonates         -  raloxifene (Evista)      Medications that lack necessity during an acute hospital stay:            -  nasal antihistamines        -  nasal ipratropium 0.03% and 0.06%        -  nasal miacalcin        -  acyclovir topical cream/ointment orders for herpes labialis (cold sores)    Sylvester Paulino, PharmD  08/27/20 2:07 PM

## 2020-08-27 NOTE — ED NOTES
Pt ambulated to restroom without assistance. Gait steady. Pt has no complaints at this time.       Vania Padgett RN  08/27/20 2436

## 2020-08-28 VITALS
OXYGEN SATURATION: 97 % | TEMPERATURE: 96.2 F | BODY MASS INDEX: 32.76 KG/M2 | SYSTOLIC BLOOD PRESSURE: 112 MMHG | HEIGHT: 62 IN | WEIGHT: 178 LBS | DIASTOLIC BLOOD PRESSURE: 77 MMHG | RESPIRATION RATE: 16 BRPM | HEART RATE: 75 BPM

## 2020-08-28 LAB
ALBUMIN SERPL-MCNC: 3.8 G/DL (ref 3.5–5.2)
ALP BLD-CCNC: 45 U/L (ref 35–104)
ALT SERPL-CCNC: 19 U/L (ref 0–32)
ANION GAP SERPL CALCULATED.3IONS-SCNC: 11 MMOL/L (ref 7–16)
AST SERPL-CCNC: 16 U/L (ref 0–31)
BASOPHILS ABSOLUTE: 0.04 E9/L (ref 0–0.2)
BASOPHILS RELATIVE PERCENT: 0.8 % (ref 0–2)
BILIRUB SERPL-MCNC: 0.8 MG/DL (ref 0–1.2)
BUN BLDV-MCNC: 18 MG/DL (ref 6–20)
CALCIUM SERPL-MCNC: 9 MG/DL (ref 8.6–10.2)
CHLORIDE BLD-SCNC: 99 MMOL/L (ref 98–107)
CHOLESTEROL, TOTAL: 130 MG/DL (ref 0–199)
CO2: 26 MMOL/L (ref 22–29)
CREAT SERPL-MCNC: 0.7 MG/DL (ref 0.5–1)
EOSINOPHILS ABSOLUTE: 0.09 E9/L (ref 0.05–0.5)
EOSINOPHILS RELATIVE PERCENT: 1.7 % (ref 0–6)
GFR AFRICAN AMERICAN: >60
GFR NON-AFRICAN AMERICAN: >60 ML/MIN/1.73
GLUCOSE BLD-MCNC: 122 MG/DL (ref 74–99)
HBA1C MFR BLD: 7 % (ref 4–5.6)
HCT VFR BLD CALC: 39.6 % (ref 34–48)
HDLC SERPL-MCNC: 43 MG/DL
HEMOGLOBIN: 12.9 G/DL (ref 11.5–15.5)
IMMATURE GRANULOCYTES #: 0.01 E9/L
IMMATURE GRANULOCYTES %: 0.2 % (ref 0–5)
LDL CHOLESTEROL CALCULATED: 64 MG/DL (ref 0–99)
LYMPHOCYTES ABSOLUTE: 2.55 E9/L (ref 1.5–4)
LYMPHOCYTES RELATIVE PERCENT: 49.3 % (ref 20–42)
MCH RBC QN AUTO: 29.7 PG (ref 26–35)
MCHC RBC AUTO-ENTMCNC: 32.6 % (ref 32–34.5)
MCV RBC AUTO: 91.2 FL (ref 80–99.9)
METER GLUCOSE: 116 MG/DL (ref 74–99)
METER GLUCOSE: 148 MG/DL (ref 74–99)
MONOCYTES ABSOLUTE: 0.33 E9/L (ref 0.1–0.95)
MONOCYTES RELATIVE PERCENT: 6.4 % (ref 2–12)
NEUTROPHILS ABSOLUTE: 2.15 E9/L (ref 1.8–7.3)
NEUTROPHILS RELATIVE PERCENT: 41.6 % (ref 43–80)
PDW BLD-RTO: 12.4 FL (ref 11.5–15)
PLATELET # BLD: 213 E9/L (ref 130–450)
PMV BLD AUTO: 11.4 FL (ref 7–12)
POTASSIUM REFLEX MAGNESIUM: 3.8 MMOL/L (ref 3.5–5)
RBC # BLD: 4.34 E12/L (ref 3.5–5.5)
SODIUM BLD-SCNC: 136 MMOL/L (ref 132–146)
TOTAL PROTEIN: 6.1 G/DL (ref 6.4–8.3)
TRIGL SERPL-MCNC: 116 MG/DL (ref 0–149)
VLDLC SERPL CALC-MCNC: 23 MG/DL
WBC # BLD: 5.2 E9/L (ref 4.5–11.5)

## 2020-08-28 PROCEDURE — 80061 LIPID PANEL: CPT

## 2020-08-28 PROCEDURE — 36415 COLL VENOUS BLD VENIPUNCTURE: CPT

## 2020-08-28 PROCEDURE — G0378 HOSPITAL OBSERVATION PER HR: HCPCS

## 2020-08-28 PROCEDURE — 6370000000 HC RX 637 (ALT 250 FOR IP): Performed by: HOSPITALIST

## 2020-08-28 PROCEDURE — 83036 HEMOGLOBIN GLYCOSYLATED A1C: CPT

## 2020-08-28 PROCEDURE — 80053 COMPREHEN METABOLIC PANEL: CPT

## 2020-08-28 PROCEDURE — 82962 GLUCOSE BLOOD TEST: CPT

## 2020-08-28 PROCEDURE — 6360000002 HC RX W HCPCS: Performed by: INTERNAL MEDICINE

## 2020-08-28 PROCEDURE — 6370000000 HC RX 637 (ALT 250 FOR IP): Performed by: CLINICAL NURSE SPECIALIST

## 2020-08-28 PROCEDURE — 94640 AIRWAY INHALATION TREATMENT: CPT

## 2020-08-28 PROCEDURE — 85025 COMPLETE CBC W/AUTO DIFF WBC: CPT

## 2020-08-28 PROCEDURE — 96375 TX/PRO/DX INJ NEW DRUG ADDON: CPT

## 2020-08-28 PROCEDURE — 6360000002 HC RX W HCPCS: Performed by: CLINICAL NURSE SPECIALIST

## 2020-08-28 PROCEDURE — 2580000003 HC RX 258: Performed by: HOSPITALIST

## 2020-08-28 PROCEDURE — 2580000003 HC RX 258: Performed by: CLINICAL NURSE SPECIALIST

## 2020-08-28 RX ORDER — HEPARIN SODIUM (PORCINE) LOCK FLUSH IV SOLN 100 UNIT/ML 100 UNIT/ML
500 SOLUTION INTRAVENOUS PRN
Status: DISCONTINUED | OUTPATIENT
Start: 2020-08-28 | End: 2020-08-28 | Stop reason: HOSPADM

## 2020-08-28 RX ADMIN — LEVOTHYROXINE SODIUM 125 MCG: 0.12 TABLET ORAL at 06:53

## 2020-08-28 RX ADMIN — PROPRANOLOL HYDROCHLORIDE 10 MG: 10 TABLET ORAL at 08:42

## 2020-08-28 RX ADMIN — ASPIRIN 81 MG: 81 TABLET ORAL at 08:42

## 2020-08-28 RX ADMIN — ARFORMOTEROL TARTRATE 15 MCG: 15 SOLUTION RESPIRATORY (INHALATION) at 10:12

## 2020-08-28 RX ADMIN — SODIUM CHLORIDE, PRESERVATIVE FREE 10 ML: 5 INJECTION INTRAVENOUS at 08:44

## 2020-08-28 RX ADMIN — ONDANSETRON 4 MG: 2 INJECTION INTRAMUSCULAR; INTRAVENOUS at 13:58

## 2020-08-28 RX ADMIN — CLONAZEPAM 0.5 MG: 0.5 TABLET ORAL at 08:41

## 2020-08-28 RX ADMIN — IPRATROPIUM BROMIDE AND ALBUTEROL SULFATE 3 ML: .5; 3 SOLUTION RESPIRATORY (INHALATION) at 14:06

## 2020-08-28 RX ADMIN — BUDESONIDE 500 MCG: 0.5 INHALANT RESPIRATORY (INHALATION) at 10:13

## 2020-08-28 RX ADMIN — Medication 2 DROP: at 13:59

## 2020-08-28 RX ADMIN — Medication 10 ML: at 08:48

## 2020-08-28 RX ADMIN — PROPRANOLOL HYDROCHLORIDE 10 MG: 10 TABLET ORAL at 13:58

## 2020-08-28 RX ADMIN — LEVOMILNACIPRAN HYDROCHLORIDE 40 MG: 20 CAPSULE, EXTENDED RELEASE ORAL at 08:42

## 2020-08-28 RX ADMIN — DIVALPROEX SODIUM 250 MG: 250 TABLET, DELAYED RELEASE ORAL at 08:42

## 2020-08-28 RX ADMIN — FUROSEMIDE 20 MG: 20 TABLET ORAL at 08:41

## 2020-08-28 RX ADMIN — SODIUM CHLORIDE, PRESERVATIVE FREE 500 UNITS: 5 INJECTION INTRAVENOUS at 15:29

## 2020-08-28 RX ADMIN — POLYETHYLENE GLYCOL 3350 17 G: 17 POWDER, FOR SOLUTION ORAL at 08:46

## 2020-08-28 RX ADMIN — ROPINIROLE HYDROCHLORIDE 0.5 MG: 0.5 TABLET, FILM COATED ORAL at 13:58

## 2020-08-28 RX ADMIN — ROPINIROLE HYDROCHLORIDE 0.5 MG: 0.5 TABLET, FILM COATED ORAL at 08:42

## 2020-08-28 RX ADMIN — INSULIN GLARGINE 2 UNITS: 100 INJECTION, SOLUTION SUBCUTANEOUS at 08:47

## 2020-08-28 RX ADMIN — IPRATROPIUM BROMIDE AND ALBUTEROL SULFATE 3 ML: .5; 3 SOLUTION RESPIRATORY (INHALATION) at 10:11

## 2020-08-28 RX ADMIN — POTASSIUM BICARBONATE 20 MEQ: 782 TABLET, EFFERVESCENT ORAL at 08:41

## 2020-08-28 RX ADMIN — Medication 2 DROP: at 08:45

## 2020-08-28 RX ADMIN — PANTOPRAZOLE SODIUM 40 MG: 40 TABLET, DELAYED RELEASE ORAL at 06:53

## 2020-08-28 RX ADMIN — FLUTICASONE PROPIONATE 1 SPRAY: 50 SPRAY, METERED NASAL at 08:45

## 2020-08-28 RX ADMIN — ACETAMINOPHEN 650 MG: 325 TABLET, FILM COATED ORAL at 06:52

## 2020-08-28 RX ADMIN — FOLIC ACID 1 MG: 1 TABLET ORAL at 11:35

## 2020-08-28 RX ADMIN — INSULIN LISPRO 2 UNITS: 100 INJECTION, SOLUTION INTRAVENOUS; SUBCUTANEOUS at 11:35

## 2020-08-28 RX ADMIN — Medication 500 MG: at 08:41

## 2020-08-28 RX ADMIN — TIZANIDINE 2 MG: 4 TABLET ORAL at 08:43

## 2020-08-28 RX ADMIN — ACETAMINOPHEN 650 MG: 325 TABLET, FILM COATED ORAL at 13:58

## 2020-08-28 ASSESSMENT — PAIN SCALES - GENERAL
PAINLEVEL_OUTOF10: 7
PAINLEVEL_OUTOF10: 8
PAINLEVEL_OUTOF10: 8
PAINLEVEL_OUTOF10: 7

## 2020-08-28 ASSESSMENT — PAIN DESCRIPTION - FREQUENCY: FREQUENCY: CONTINUOUS

## 2020-08-28 ASSESSMENT — PAIN - FUNCTIONAL ASSESSMENT: PAIN_FUNCTIONAL_ASSESSMENT: ACTIVITIES ARE NOT PREVENTED

## 2020-08-28 ASSESSMENT — PAIN DESCRIPTION - PROGRESSION: CLINICAL_PROGRESSION: NOT CHANGED

## 2020-08-28 ASSESSMENT — PAIN DESCRIPTION - ONSET: ONSET: ON-GOING

## 2020-08-28 ASSESSMENT — PAIN DESCRIPTION - ORIENTATION: ORIENTATION: OTHER (COMMENT)

## 2020-08-28 ASSESSMENT — PAIN DESCRIPTION - LOCATION: LOCATION: HEAD

## 2020-08-28 ASSESSMENT — PAIN DESCRIPTION - PAIN TYPE: TYPE: ACUTE PAIN

## 2020-08-28 ASSESSMENT — PAIN DESCRIPTION - DESCRIPTORS
DESCRIPTORS: HEADACHE
DESCRIPTORS: HEADACHE

## 2020-08-28 NOTE — CARE COORDINATION
8/28 Transition of Care: met with patient in the room. She is alert and oriented. She resides with her . She does not use any dme equipment at home. She does own a cane and walker. She has attended outpatient physical therapy in the past. She sees Dr Skylar Mendoza and [facility] in [place]. She does not anticipate any home going needs and her  will provide her transportation home. Plan is for discharge today.   Maegan Montoya RN CM  [phone]

## 2020-08-31 ENCOUNTER — TELEPHONE (OUTPATIENT)
Dept: PRIMARY CARE CLINIC | Age: 45
End: 2020-08-31

## 2020-08-31 NOTE — TELEPHONE ENCOUNTER
[facility] Transitions Initial Follow Up Call    Outreach made within 2 business days of discharge: Yes    Patient: Fady Jordan Patient : 1975   MRN: <[id number]>  Reason for Admission: There are no discharge diagnoses documented for the most recent discharge. Discharge Date: 20       Spoke with: Sally Rae. [address] [facility]    Discharge department/facility: [facility] Interactive Patient Contact:  Was patient able to fill all prescriptions: No: pt wasn't prescribed any medications at the time of discharge. Was patient instructed to bring all medications to the follow-up visit: No:   Is patient taking all medications as directed in the discharge summary?  No  Does patient understand their discharge instructions: Yes  Does patient have questions or concerns that need addressed prior to 7-14 day follow up office visit: no    Scheduled appointment with PCP within 7-14 days    Follow Up  Future Appointments   Date Time Provider Alisa Pike   2020  2:00 PM Joanie Barr, Paul Yan   2020 11:00 AM [address] [place] [address]

## 2020-09-01 ENCOUNTER — TELEPHONE (OUTPATIENT)
Dept: FAMILY MEDICINE CLINIC | Age: 45
End: 2020-09-01

## 2020-09-01 ENCOUNTER — OFFICE VISIT (OUTPATIENT)
Dept: PRIMARY CARE CLINIC | Age: 45
End: 2020-09-01
Payer: MEDICAID

## 2020-09-01 VITALS
HEIGHT: 62 IN | DIASTOLIC BLOOD PRESSURE: 82 MMHG | TEMPERATURE: 97.8 F | WEIGHT: 181.2 LBS | SYSTOLIC BLOOD PRESSURE: 122 MMHG | RESPIRATION RATE: 16 BRPM | BODY MASS INDEX: 33.34 KG/M2 | OXYGEN SATURATION: 98 % | HEART RATE: 97 BPM

## 2020-09-01 PROBLEM — R29.898 WEAKNESS OF EXTREMITY: Status: ACTIVE | Noted: 2020-09-01

## 2020-09-01 PROBLEM — R13.19 OTHER DYSPHAGIA: Status: ACTIVE | Noted: 2018-09-18

## 2020-09-01 PROCEDURE — 1111F DSCHRG MED/CURRENT MED MERGE: CPT | Performed by: INTERNAL MEDICINE

## 2020-09-01 PROCEDURE — 99214 OFFICE O/P EST MOD 30 MIN: CPT | Performed by: INTERNAL MEDICINE

## 2020-09-01 RX ORDER — FOLIC ACID 1 MG/1
1 TABLET ORAL
Qty: 90 TABLET | Refills: 1 | Status: SHIPPED | OUTPATIENT
Start: 2020-09-01

## 2020-09-01 RX ORDER — PANTOPRAZOLE SODIUM 40 MG/1
40 TABLET, DELAYED RELEASE ORAL
Qty: 90 TABLET | Refills: 1 | Status: SHIPPED | OUTPATIENT
Start: 2020-09-01

## 2020-09-01 RX ORDER — ROPINIROLE 0.5 MG/1
0.5 TABLET, FILM COATED ORAL SEE ADMIN INSTRUCTIONS
Qty: 270 TABLET | Refills: 1 | Status: SHIPPED | OUTPATIENT
Start: 2020-09-01 | End: 2021-10-20

## 2020-09-01 RX ORDER — LEVOTHYROXINE SODIUM 0.05 MG/1
125 TABLET ORAL
Qty: 90 TABLET | Refills: 1 | Status: SHIPPED
Start: 2020-09-01 | End: 2021-02-07 | Stop reason: SDUPTHER

## 2020-09-01 RX ORDER — LEVOTHYROXINE SODIUM 0.05 MG/1
100 TABLET ORAL
Qty: 24 TABLET | Refills: 1 | Status: SHIPPED
Start: 2020-09-03 | End: 2021-02-08 | Stop reason: ALTCHOICE

## 2020-09-01 ASSESSMENT — ENCOUNTER SYMPTOMS
EYE DISCHARGE: 0
RHINORRHEA: 0
NAUSEA: 1
BLOOD IN STOOL: 0
STRIDOR: 0
EYE ITCHING: 0
TROUBLE SWALLOWING: 0
EYE PAIN: 0
ABDOMINAL PAIN: 0
WHEEZING: 0
VOMITING: 0
SORE THROAT: 0
DIARRHEA: 1
COLOR CHANGE: 0
PHOTOPHOBIA: 0
CONSTIPATION: 1
SHORTNESS OF BREATH: 0
COUGH: 0
FACIAL SWELLING: 0
ANAL BLEEDING: 0

## 2020-09-01 NOTE — PROGRESS NOTES
2020    Name: Celia Ochoa : 1975 Sex: female  Age: 45 y.o. Subjective:  Chief Complaint   Patient presents with    Follow-Up from Hospital     TIA        HPI   Patient was hospitalized on  at [facility] and discharged on Friday, 2020. She was initially seen at [facility] emergency department on 2020 and treated as a migraine headache. When she presented to [facility] the next day she had some right-sided weakness and she was admitted with strokelike symptoms. She was seen by neurology who felt she may have had a TIA. She was told to follow-up with Dr. Rodríguez Wilhelm. This morning she saw Dr. Francisco Mesa and he scheduled her for a nuclear stress test next week. Reviewed reports from [facility]. .  The week prior to that she received an infusion of IVIG as ordered by her immunologist in [facility] [employer]. Since discharge she has had problems with dysphagia causing her to \"cough\". Is hard for her to swallow certain foods and she has a lot of mucus in the morning in the evening. Recommendation was for a modified barium swallow. She also has some problems saying words so we will get speech therapy to help with this as well. Because of her weakness of her right hand, she tends to drop things and when she walks she trips over her right foot we will also get physical therapy and Occupational Therapy for her. Requisitions for both will be given to the patient and she will fax these over to her . Medication reconciliation done. Prescription management done. She was not restarted on her aspirin supplement asked her to restart enteric-coated aspirin 81 mg a day. Also noted that she is not on a statin. We will address this when she returns. Problem #1: Hypertension  Blood pressures are fairly reasonable on present medications    Problem #2 history of pseudotumor cerebri  She follows up with Dr. Tabatha Grissom.   She had a recent lumbar puncture which was unremarkable. Problem #3 insulin requiring type 2 diabetes mellitus  Blood sugars are acceptable on present dose of insulin. She follows up with an endocrinologist in the [place] area. Problem #4 GI complaints  She has gastroparesis, dysphagia,She has had esophageal dilatations in the past, GERD irritable bowel syndrome with alternating constipation diarrhea. Nonalcoholic fatty liver disease. Problem #5: Common variable immunodeficiency-IgG. She gets IgG infusions on a regular basis, these are ordered by her hematologist.  She also has MTHFR mutation she has a history of iron deficiency anemia    Problem #5: Hypothyroidism  She follows with her endocrinologist for this as well. She has a known thyroid nodule    Problem #6: Depression  She is on antidepressants and antianxiety medications. She follows with her psychiatrist    Problem #7: Respiratory problems  She has a history of asthma and obstructive sleep apnea on a CPAP. She has a known lung nodule, right upper lobe discovered in 2018    . Problem #8: Connective tissue disorder  She has some symptoms of Sjogren's syndrome. We are not sure whether she also has an overlap syndrome with another connective tissue problem. 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 Problem #9: Musculoskeletal problems  She has a history of fibromyalgia, chronic cervicalgia. MRI of her cervical spine was done recently. Reviewed report. History of restless leg syndrome. She has history of degenerative disc disease and cervical spondylosis    Problem #10: Migraines  She follows up with neurologist who has her on different medications. She has an accidental medication error in [facility]. They say she is on or has been on Suboxone but she never had this medication.   Apparently another Christel Mor had this medicine but [facility] refuses to correct this per patient report    She has a history of kidney stones, hyperlipidemia, and cysts of the right breast.    We had a discussion about resuscitation directives. She is not sure about the difference between DNR CC arrest and DNR CC. I explained this to her. We will give her a copy of the DNR papers so her  can look at it and talk to her about it. . They would let me know what they wish done when she comes back in a month. Review of Systems   Constitutional: Positive for fatigue. Negative for appetite change and unexpected weight change. HENT: Negative for congestion, ear pain, facial swelling, rhinorrhea, sore throat, tinnitus and trouble swallowing. Eyes: Negative for photophobia, pain, discharge, itching and visual disturbance. No vision in left eye and partial vision right   Respiratory: Negative for cough, shortness of breath, wheezing and stridor. Cardiovascular: Negative for chest pain, palpitations and leg swelling. Gastrointestinal: Positive for constipation, diarrhea and nausea. Negative for abdominal pain, anal bleeding, blood in stool and vomiting. Dysphagia   Endocrine: Negative for cold intolerance, heat intolerance, polydipsia, polyphagia and polyuria. Genitourinary: Positive for urgency. Negative for difficulty urinating, dysuria, flank pain, frequency and hematuria. Musculoskeletal: Positive for myalgias and neck pain. Negative for arthralgias, gait problem and joint swelling. Skin: Negative for color change, pallor and rash. Allergic/Immunologic: Negative for environmental allergies and food allergies. Neurological: Positive for weakness and headaches. Negative for dizziness, tremors, seizures, syncope, speech difficulty, light-headedness and numbness. Right-sided weakness  Mild expressive aphasia   Hematological: Negative for adenopathy. Does not bruise/bleed easily.    Psychiatric/Behavioral: Negative for agitation, behavioral problems, confusion, sleep disturbance and suicidal ideas. The patient is not nervous/anxious. Current Outpatient Medications:     rOPINIRole (REQUIP) 0.5 MG tablet, Take 1 tablet by mouth See Admin Instructions 1 tablet - breakfast 2 tablets - supper, Disp: 270 tablet, Rfl: 1    levothyroxine (SYNTHROID) 50 MCG tablet, Take 2.5 tablets by mouth Five times weekly Given Monday,Tuesday,Wednesday,Thursday,Friday, Disp: 90 tablet, Rfl: 1    [START ON 9/3/2020] levothyroxine (SYNTHROID) 50 MCG tablet, Take 2 tablets by mouth Twice a Week Given Saturday,Sunday, Disp: 24 tablet, Rfl: 1    pantoprazole (PROTONIX) 40 MG tablet, Take 1 tablet by mouth every morning (before breakfast), Disp: 90 tablet, Rfl: 1    folic acid (FOLVITE) 1 MG tablet, Take 1 tablet by mouth Daily with lunch, Disp: 90 tablet, Rfl: 1    acetaminophen (TYLENOL) 325 MG tablet, Take 650 mg by mouth every 6 hours as needed for Pain, Disp: , Rfl:     sodium chloride (OCEAN, BABY AYR) 0.65 % nasal spray, 1 spray by Nasal route 4 times daily as needed for Congestion, Disp: , Rfl:     azelastine (ASTELIN) 0.1 % nasal spray, 1 spray by Nasal route 2 times daily, Disp: , Rfl:     diphenhydrAMINE (BENYLIN) 12.5 MG/5ML liquid, Take 25 mg by mouth 4 times daily as needed for Allergies (migrane break through), Disp: , Rfl:     Artificial Saliva (BIOTENE DRY MOUTH MOISTURIZING MT), Take 1 spray by mouth every 3 hours, Disp: , Rfl:     clotrimazole (LOTRIMIN) 1 % cream, Apply topically 2 times daily as needed Under breast and abdominal folds, Disp: , Rfl:     OIL OF OREGANO PO, Take 60 mg by mouth daily (with breakfast), Disp: , Rfl:     Bacillus Coagulans-Inulin (PROBIOTIC FORMULA) 1-250 BILLION-MG CAPS, Take 1 capsule by mouth nightly, Disp: , Rfl:     miconazole (MICOTIN) 2 % powder, Apply topically daily Apply to groin, under breast and skin folds. , Disp: , Rfl:     magnesium hydroxide (MILK OF MAGNESIA) 400 MG/5ML suspension, Take 30 mLs by mouth 4 times daily as needed for Fibromyalgia     Headache     Hematuria     Immune deficiency disorder (HCC)     Rectal bleeding     Scleroderma [facility])        Health Maintenance Due   Topic Date Due    TSH testing  1975    Diabetic foot exam  12/18/1987    A1C test (Diabetic or Prediabetic)  12/18/1987    Diabetic retinal exam  12/18/1987    Lipid screen  12/18/1987    HIV screen  12/18/1992    Diabetic microalbuminuria test  12/18/1995    Hepatitis B vaccine (1 of 3 - Risk 3-dose series) 12/18/1996    Cervical cancer screen  12/18/1998    Flu vaccine (1) 09/01/2020        Patient Active Problem List   Diagnosis    Vision loss, bilateral    Type 2 diabetes mellitus without complication, with long-term current use of insulin (HCC)    Thyroid nodule    Solitary cyst of right breast    Sjogren's syndrome with keratoconjunctivitis sicca ([address]    Retention of urine    Restless legs    Raynaud's phenomenon without gangrene    Primary fibromyalgia syndrome    Postural orthostatic tachycardia syndrome    PAULINE on CPAP    Obesity, Class II, BMI 35-39.9    Other and unspecified hyperlipidemia    Other dysphagia    Lung nodule    Long term current use of insulin (HCC)    Insomnia    Intractable chronic migraine without aura and without status migrainosus    Hypothyroidism    Hypogammaglobulinemia ([address]    History of renal calculi    Gastroparesis    Gastroesophageal reflux disease    Essential hypertension    DDD (degenerative disc disease)    CVID (common variable immunodeficiency) (HCC)    Migraine    Irritable bowel syndrome    Constipation    Diarrhea    Benign intracranial hypertension    Depression    Anxiety    Anemia    Polycystic disease, ovaries    Cervicalgia    Asthma    Connective tissue disorder (HCC)    Stroke-like symptoms    TIA (transient ischemic attack)    Weakness of extremity        Past Surgical History:   Procedure Laterality Date    APPENDECTOMY      CHOLECYSTECTOMY      HYSTERECTOMY          Family History   Problem Relation Age of Onset    Stroke Sister     Osteoporosis Sister     Hypertension Brother         Social History     Tobacco Use    Smoking status: Never Smoker    Smokeless tobacco: Never Used   Substance Use Topics    Alcohol use: No    Drug use: No        Objective  Vitals:    09/01/20 1128   BP: 122/82   Pulse: 97   Resp: 16   Temp: 97.8 °F (36.6 °C)   SpO2: 98%   Weight: 181 lb 3.2 oz (82.2 kg)   Height: 5' 2\" (1.575 m)        Exam:  Physical Exam  Constitutional:       General: She is not in acute distress. Appearance: She is well-developed. She is obese. She is not ill-appearing. Comments: Patient is blind   HENT:      Head: Normocephalic. Right Ear: External ear normal.      Left Ear: External ear normal.   Eyes:      General: No scleral icterus. Right eye: No discharge. Left eye: No discharge. Conjunctiva/sclera: Conjunctivae normal.      Pupils: Pupils are equal, round, and reactive to light. Neck:      Musculoskeletal: Normal range of motion and neck supple. Thyroid: No thyromegaly. Cardiovascular:      Rate and Rhythm: Normal rate and regular rhythm. Heart sounds: Normal heart sounds. No murmur. No friction rub. No gallop. Pulmonary:      Effort: Pulmonary effort is normal. No respiratory distress. Breath sounds: Normal breath sounds. No wheezing or rales. Chest:      Chest wall: No tenderness. Abdominal:      General: Bowel sounds are normal. There is no distension. Palpations: Abdomen is soft. There is no mass. Tenderness: There is no abdominal tenderness. There is no guarding or rebound. Musculoskeletal: Normal range of motion. General: No tenderness or deformity. Lymphadenopathy:      Cervical: No cervical adenopathy. Skin:     General: Skin is warm and dry. Coloration: Skin is not pale. Findings: No erythema or rash.    Neurological:      Mental Status: She follow-up with neurology         Weakness of extremity     PT and OT         Relevant Orders    External Referral To Physical Therapy           Return in 1 month (on 10/1/2020).        Barbara Osgood,   9/1/2020

## 2020-09-01 NOTE — ASSESSMENT & PLAN NOTE
And aspirin, consider adding atorvastatin 80 mg when she returns.   Follow-up with her neurologist of choice

## 2020-09-01 NOTE — ASSESSMENT & PLAN NOTE
Blood pressures are stable. Continue medications and monitor blood pressures at home. Call office if systolics are over 783 over diastolics over 90.

## 2020-09-01 NOTE — TELEPHONE ENCOUNTER
Pt called in her insurance will not cover her synthroid with 2 different prescriptions she stated it just needs to be on the label to take it two different ways.

## 2020-09-01 NOTE — TELEPHONE ENCOUNTER
How can we write that out? She is on levothyroxine 125 mcg 5 days a week and levothyroxine 150 mcg twice a week.

## 2020-09-04 ENCOUNTER — OFFICE VISIT (OUTPATIENT)
Dept: NEUROLOGY | Age: 45
End: 2020-09-04
Payer: MEDICAID

## 2020-09-04 VITALS
SYSTOLIC BLOOD PRESSURE: 127 MMHG | HEIGHT: 62 IN | OXYGEN SATURATION: 100 % | DIASTOLIC BLOOD PRESSURE: 89 MMHG | HEART RATE: 90 BPM | BODY MASS INDEX: 32.57 KG/M2 | TEMPERATURE: 98 F | WEIGHT: 177 LBS

## 2020-09-04 PROCEDURE — 99205 OFFICE O/P NEW HI 60 MIN: CPT | Performed by: NURSE PRACTITIONER

## 2020-09-04 PROCEDURE — G8427 DOCREV CUR MEDS BY ELIG CLIN: HCPCS | Performed by: NURSE PRACTITIONER

## 2020-09-04 PROCEDURE — G8417 CALC BMI ABV UP PARAM F/U: HCPCS | Performed by: NURSE PRACTITIONER

## 2020-09-04 PROCEDURE — 1036F TOBACCO NON-USER: CPT | Performed by: NURSE PRACTITIONER

## 2020-09-04 RX ORDER — ASPIRIN 81 MG/1
81 TABLET ORAL DAILY
COMMUNITY

## 2020-09-04 RX ORDER — ATORVASTATIN CALCIUM 10 MG/1
10 TABLET, FILM COATED ORAL DAILY
Qty: 90 TABLET | Refills: 1 | Status: SHIPPED | OUTPATIENT
Start: 2020-09-04 | End: 2021-02-25

## 2020-09-04 NOTE — PROGRESS NOTES
[facility]. King Vanessa M.D., F.A.C.P. Mac Sierra, ANDREZ, APRN, CNS  Alton Beckman. Vimal Marroquin, MSN, APRN-FNP-C  Leeanne Ocasio MSN, APRN, FNP-C  Catina CONNELL, PA-C  [address] MSN, APRN, FNP-C  [address], [facility]  [place], [address]  Phone: [phone]  Fax: [phone]       Claudia Craven is a 45 y.o. right handed female     Patient presents today to neurology office for follow-up to recent hospital admission. Patient presents with her --- patient is a decent historian;  added little to no additional history    Past Medical History:     Past Medical History:   Diagnosis Date    Anemia     Anxiety     Asthma     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     Connective tissue disease ([address]     Cyst of right breast     DDD (degenerative disc disease), cervical     Depression     Diabetes mellitus ([address]     Dysphagia     Fibromyalgia     Gastroparesis     GERD (gastroesophageal reflux disease)     Headache     Hematuria     Hyperlipidemia     Hypertension     Hypothyroidism     IBS (irritable bowel syndrome)     with constipation and diarrhea    Immune deficiency disorder ([address]     Insomnia     Kidney stones     Meningitis     x4--- twice after IVIG     PAULINE on CPAP     cpap dependence    PCOS (polycystic ovarian syndrome)     POTS (postural orthostatic tachycardia syndrome)     Pseudotumor cerebri     in 2008--- treated with LP; allergy to diamox    Raynaud's disease     Rectal bleeding     RLS (restless legs syndrome)     Scleroderma ([address]     Sjogren's disease ([address]     Thyroid nodule     TIA (transient ischemic attack)        Past Surgical History:       Past Surgical History:   Procedure Laterality Date    APPENDECTOMY      CARPAL TUNNEL RELEASE Bilateral     CHOLECYSTECTOMY      HYSTERECTOMY      PORT SURGERY      x3    PYLOROPLASTY         Allergies:        Allergies   Allergen Reactions spray by Nasal route 4 times daily as needed for Congestion   Yes Historical Provider, MD   azelastine (ASTELIN) 0.1 % nasal spray 1 spray by Nasal route 2 times daily   Yes Historical Provider, MD   diphenhydrAMINE (BENYLIN) 12.5 MG/5ML liquid Take 25 mg by mouth 4 times daily as needed for Allergies (migrane break through)   Yes Historical Provider, MD   Artificial Saliva (BIOTENE DRY MOUTH MOISTURIZING MT) Take 1 spray by mouth every 3 hours   Yes Historical Provider, MD   clotrimazole (LOTRIMIN) 1 % cream Apply topically 2 times daily as needed Under breast and abdominal folds   Yes Historical Provider, MD   OIL OF OREGANO PO Take 60 mg by mouth daily (with breakfast)   Yes Historical Provider, MD   Bacillus Coagulans-Inulin (PROBIOTIC FORMULA) 1-250 BILLION-MG CAPS Take 1 capsule by mouth nightly   Yes Historical Provider, MD   miconazole (MICOTIN) 2 % powder Apply topically daily Apply to groin, under breast and skin folds.    Yes Historical Provider, MD   magnesium hydroxide (MILK OF MAGNESIA) 400 MG/5ML suspension Take 30 mLs by mouth 4 times daily as needed for Constipation   Yes Historical Provider, MD   NONFORMULARY Apply topically daily as needed (joint pain) Natural T Relief Pain Cream   Yes Historical Provider, MD   SENNA LEAVES PO Take 470 mg by mouth three times daily    Yes Historical Provider, MD   promethazine (PHENERGAN) 25 MG tablet Take 25 mg by mouth every 6 hours as needed for Nausea   Yes Historical Provider, MD   Magnesium 500 MG CAPS Take 500 mg by mouth 2 times daily   Yes Historical Provider, MD   tiZANidine (ZANAFLEX) 2 MG tablet Take 2 mg by mouth nightly   Yes Historical Provider, MD   triamcinolone (KENALOG) 0.1 % cream Apply topically daily as needed (rash)   Yes Historical Provider, MD   tetrahydrozoline 0.05 % ophthalmic solution Place 2 drops into both eyes 3 times daily   Yes Historical Provider, MD   NONFORMULARY Take 2 capsules by mouth 2 times daily (with meals) Turmeric machine, and multiple other chronic conditions. Patient is scheduled for barium swallow and EGD next week in [place]. Patient is also followed by Dr. Jayson Ji, autoimmunologist/allergist at [facility] who treats her immune deficiency disorder. Patient also mentions that this doctor is moving to [facility] and this is too far for her to drive so she is unsure how she will get further treatment. Patient reports broken sleep of about 3 to 4 hours each night. Patient drinks about 6 ounces of coffee a day. Patient drinks over a gallon of water daily. Patient eats 6 small meals daily due to her gastric issues. Patient reports mild stress level. Patient walks 15 minutes a few times weekly. Objective:       /89 (Site: Right Upper Arm)   Pulse 90   Temp 98 °F (36.7 °C)   Ht 5' 2\" (1.575 m)   Wt 177 lb (80.3 kg)   SpO2 100%   BMI 32.37 kg/m²     General appearance: alert, appears stated age, cooperative and in no distress  Head: normocephalic, without obvious abnormality, atraumatic. Left occipital and left temporal tenderness  Eyes: conjunctivae/corneas clear; no drainage  Neck:  supple, symmetrical, trachea midline   Back: symmetric, no curvature.  ROM normal.  Bilateral trapezius muscle tenderness  Lungs: clear to auscultation bilaterally  Heart: regular rate and rhythm,   Abdomen: soft, non-tender; bowel sounds normal;   Extremities: normal, atraumatic, no cyanosis or edema  Pulses: 2+ and symmetric  Skin:  color, texture, turgor normal--no rashes or lesions      Mental Status: alert and oriented x 4, very conversant    Appropriate attention/concentration  Intact fundus of knowledge  Memories intact    Speech: no dysarthria  Language: no aphasias---reading, writing, repetition, and object identification intact    Cranial Nerves:  I: smell  intact   II: visual acuity     II: visual fields  no blink to threat; blind bilaterally worse to left eye   II: pupils PERRL   III,VII: ptosis None   III,IV,VI: extraocular muscles  EOMI without nystagmus   V: mastication Normal   V: facial light touch sensation  Normal   V,VII: corneal reflex     VII: facial muscle function - upper  Normal   VII: facial muscle function - lower Normal   VIII: hearing Normal   IX: soft palate elevation  Normal   IX,X: gag reflex    XI: trapezius strength  5/5   XI: sternocleidomastoid strength 5/5   XI: neck extension strength  5/5   XII: tongue strength  Normal     Motor:  5/5 left upper and lower extremity  +3/5 right upper and lower extremity  Normal bulk and tone  Right pronator drift  No abnormal movements    Sensory:  LT and PP decreased to right when compared to left  Vibration decreased to right when compared to left    Coordination:   FN slower to right; normal to left  FFM and MEGAN normal  HS normal    Gait:  Right hemiparetic gait with cane    DTR:   Right Brachioradialis reflex 1+  Left Brachioradialis reflex 1+  Right Biceps reflex 1+  Left Biceps reflex 1+  Right Triceps reflex 1+  Left Triceps reflex 1+  Right Quadriceps reflex 1+  Left Quadriceps reflex 1+  Right Achilles reflex 1+  Left Achilles reflex 1+    No Silvestre's    No other pathological reflexes    Laboratory/Radiology:  ry/Radiology:     CBC:   Lab Results   Component Value Date    WBC 5.2 08/28/2020    RBC 4.34 08/28/2020    RBC 4.31 03/11/2020    HGB 12.9 08/28/2020    HCT 39.6 08/28/2020    MCV 91.2 08/28/2020    MCH 29.7 08/28/2020    MCHC 32.6 08/28/2020    RDW 12.4 08/28/2020     08/28/2020    MPV 11.4 08/28/2020     CMP:    Lab Results   Component Value Date     08/28/2020    K 3.8 08/28/2020    CL 99 08/28/2020    CO2 26 08/28/2020    BUN 18 08/28/2020    CREATININE 0.7 08/28/2020    GFRAA >60 08/28/2020    AGRATIO 1.5 08/26/2020    LABGLOM >60 08/28/2020    GLUCOSE 122 08/28/2020    PROT 6.1 08/28/2020    LABALBU 3.8 08/28/2020    CALCIUM 9.0 08/28/2020    BILITOT 0.8 08/28/2020    ALKPHOS 45 08/28/2020    AST 16 08/28/2020    ALT 19 08/28/2020 Hepatic Function Panel:    Lab Results   Component Value Date    ALKPHOS 45 08/28/2020    ALT 19 08/28/2020    AST 16 08/28/2020    PROT 6.1 08/28/2020    BILITOT 0.8 08/28/2020    LABALBU 3.8 08/28/2020     HgBA1c:    Lab Results   Component Value Date    LABA1C 7.0 08/28/2020     FLP:    Lab Results   Component Value Date    TRIG 116 08/28/2020    HDL 43 08/28/2020    LDLCALC 64 08/28/2020    LABVLDL 23 08/28/2020       MRI brain without contrast 8/27/2020:   No acute findings. Essentially unremarkable study. CTA head and neck with contrast 8/27/2020:   1. No significant arterial inflow disease in the neck. 2.  Intracranially, no evidence of large vessel occlusion, aneurysm or vascular malformation. Suspected mild hypoplasia of the right P1 segment. MRI cervical spine without contrast 8/9/2020:   Single level disc disease at C5-6 intervally progressed from 2006. CT head without contrast 3/11/2020: No acute brain process identified.     All labs and images were personally reviewed at the time of this visit    Assessment:     Complex migraine with hemiplegia versus TIA during hospital admission in August 2020   Right facial droop, disorientation, double vision and expressive aphasia reported--- NIHSS 6    Resolve spontaneously after arrival to ED; no tPA given   MRI negative for acute pathology   CTA head and neck was also unremarkable for LVO or vascular malformation   Patient started on aspirin; not started on statin   A1c 7.0, LDL 64   Currently on Zanaflex, magnesium, propanolol, Depakote and Emgality    Does not need additional therapy at this time--- med list should be simplified if able   Has failed multiple other therapies in the past---please see above    Reported history of pseudotumor cerebri, asymptomatic meningitis, immune deficiency disorder, dysphasia   Also reportedly being worked up for MS--- MRIs without contrast unremarkable   MRI of brain and cervical spine with contrast to further evaluate   Blind bilaterally worse to left eye--- can see shadow in right eye   Recent LP positive for oligoclonal banding; NMO <1.5, WBC 4 but elevated protein and glucose   Patient is on IVIG last received on August 13;     IVIG times the past 2 years approximately every 2 weeks--- managed by [facility] on immunologist    Plan:     MRI brain and C-spine with contrast  Will start low-dose statin  Continue aspirin  Call with any issues  Return office in 3 months      REYNOLD Wyatt NP-C  12:18 PM  9/4/2020    I spent 70 minutes with this patient obtaining the HPI and discussing the exam with greater than 50% of the time providing counseling and education on medications and other treatment plans. All questions were answered prior to leaving my office.

## 2020-09-04 NOTE — PATIENT INSTRUCTIONS
Patient Education        Migraine Headache: Care Instructions  Your Care Instructions  Migraines are painful, throbbing headaches that often start on one side of the head. They may cause nausea and vomiting and make you sensitive to light, sound, or smell. Without treatment, migraines can last from 4 hours to a few days. Medicines can help prevent migraines or stop them after they have started. Your doctor can help you find which ones work best for you. Follow-up care is a key part of your treatment and safety. Be sure to make and go to all appointments, and call your doctor if you are having problems. It's also a good idea to know your test results and keep a list of the medicines you take. How can you care for yourself at home? · Do not drive if you have taken a prescription pain medicine. · Rest in a quiet, dark room until your headache is gone. Close your eyes, and try to relax or go to sleep. Don't watch TV or read. · Put a cold, moist cloth or cold pack on the painful area for 10 to 20 minutes at a time. Put a thin cloth between the cold pack and your skin. · Use a warm, moist towel or a heating pad set on low to relax tight shoulder and neck muscles. · Have someone gently massage your neck and shoulders. · Take your medicines exactly as prescribed. Call your doctor if you think you are having a problem with your medicine. You will get more details on the specific medicines your doctor prescribes. · Don't take medicine for headache pain too often. Talk to your doctor if you are taking medicine more than 2 days a week to stop a headache. Taking too much pain medicine can lead to more headaches. These are called medicine-overuse headaches. To prevent migraines  · Keep a headache diary so you can figure out what triggers your headaches. Avoiding triggers may help you prevent headaches. Record when each headache began, how long it lasted, and what the pain was like.  Write down any other symptoms you had with the headache, such as nausea, flashing lights or dark spots, or sensitivity to bright light or loud noise. Note if the headache occurred near your period. List anything that might have triggered the headache. Triggers may include certain foods (chocolate, cheese, wine) or odors, smoke, bright light, stress, or lack of sleep. · If your doctor has prescribed medicine for your migraines, take it as directed. You may have medicine that you take only when you get a migraine and medicine that you take all the time to help prevent migraines. ? If your doctor has prescribed medicine for when you get a headache, take it at the first sign of a migraine, unless your doctor has given you other instructions. ? If your doctor has prescribed medicine to prevent migraines, take it exactly as prescribed. Call your doctor if you think you are having a problem with your medicine. · Find healthy ways to deal with stress. Migraines are most common during or right after stressful times. Try finding ways to reduce stress like practicing mindfulness or deep breathing exercises. · Get plenty of sleep and exercise. But be careful to not push yourself too hard during exercise. It may trigger a headache. · Eat meals on a regular schedule. Avoid foods and drinks that often trigger migraines. These include chocolate, alcohol (especially red wine and port), aspartame, monosodium glutamate (MSG), and some additives found in foods (such as hot dogs, lee, cold cuts, aged cheeses, and pickled foods). · Limit caffeine. Don't drink too much coffee, tea, or soda. But don't quit caffeine suddenly. That can also give you migraines. · Do not smoke or allow others to smoke around you. If you need help quitting, talk to your doctor about stop-smoking programs and medicines. These can increase your chances of quitting for good.   · If you are taking birth control pills or hormone therapy, talk to your doctor about whether they are triggering best for you. Follow-up care is a key part of your treatment and safety. Be sure to make and go to all appointments, and call your doctor if you are having problems. It's also a good idea to know your test results and keep a list of the medicines you take. How can you care for yourself at home? · Do not drive if you have taken a prescription pain medicine. · Rest in a quiet, dark room until your headache is gone. Close your eyes, and try to relax or go to sleep. Don't watch TV or read. · Put a cold, moist cloth or cold pack on the painful area for 10 to 20 minutes at a time. Put a thin cloth between the cold pack and your skin. · Use a warm, moist towel or a heating pad set on low to relax tight shoulder and neck muscles. · Have someone gently massage your neck and shoulders. · Take your medicines exactly as prescribed. Call your doctor if you think you are having a problem with your medicine. You will get more details on the specific medicines your doctor prescribes. · Don't take medicine for headache pain too often. Talk to your doctor if you are taking medicine more than 2 days a week to stop a headache. Taking too much pain medicine can lead to more headaches. These are called medicine-overuse headaches. To prevent migraines  · Keep a headache diary so you can figure out what triggers your headaches. Avoiding triggers may help you prevent headaches. Record when each headache began, how long it lasted, and what the pain was like. Write down any other symptoms you had with the headache, such as nausea, flashing lights or dark spots, or sensitivity to bright light or loud noise. Note if the headache occurred near your period. List anything that might have triggered the headache. Triggers may include certain foods (chocolate, cheese, wine) or odors, smoke, bright light, stress, or lack of sleep. · If your doctor has prescribed medicine for your migraines, take it as directed.  You may have medicine that you take only when you get a migraine and medicine that you take all the time to help prevent migraines. ? If your doctor has prescribed medicine for when you get a headache, take it at the first sign of a migraine, unless your doctor has given you other instructions. ? If your doctor has prescribed medicine to prevent migraines, take it exactly as prescribed. Call your doctor if you think you are having a problem with your medicine. · Find healthy ways to deal with stress. Migraines are most common during or right after stressful times. Try finding ways to reduce stress like practicing mindfulness or deep breathing exercises. · Get plenty of sleep and exercise. But be careful to not push yourself too hard during exercise. It may trigger a headache. · Eat meals on a regular schedule. Avoid foods and drinks that often trigger migraines. These include chocolate, alcohol (especially red wine and port), aspartame, monosodium glutamate (MSG), and some additives found in foods (such as hot dogs, lee, cold cuts, aged cheeses, and pickled foods). · Limit caffeine. Don't drink too much coffee, tea, or soda. But don't quit caffeine suddenly. That can also give you migraines. · Do not smoke or allow others to smoke around you. If you need help quitting, talk to your doctor about stop-smoking programs and medicines. These can increase your chances of quitting for good. · If you are taking birth control pills or hormone therapy, talk to your doctor about whether they are triggering your migraines. When should you call for help? [id number] anytime you think you may need emergency care. For example, call if:  · You have signs of a stroke. These may include:  ? Sudden numbness, paralysis, or weakness in your face, arm, or leg, especially on only one side of your body. ? Sudden vision changes. ? Sudden trouble speaking. ? Sudden confusion or trouble understanding simple statements.   ? Sudden problems with walking or balance. ? A sudden, severe headache that is different from past headaches. Call your doctor now or seek immediate medical care if:  · You have new or worse nausea and vomiting. · You have a new or higher fever. · Your headache gets much worse. Watch closely for changes in your health, and be sure to contact your doctor if:  · You are not getting better after 2 days (48 hours). Where can you learn more? Go to https://[employer]Deliverooeb.Tysdo. org and sign in to your [employer] account. Enter G936 in the [place] box to learn more about \"Migraine Headache: Care Instructions. \"     If you do not have an account, please click on the \"Sign Up Now\" link. Current as of: November 20, 2019               Content Version: 12.5  © 0230-5957 Healthwise, Incorporated. Care instructions adapted under license by [facility] ([facility]). If you have questions about a medical condition or this instruction, always ask your healthcare professional. [address] any warranty or liability for your use of this information.

## 2020-09-08 ENCOUNTER — TELEPHONE (OUTPATIENT)
Dept: NEUROLOGY | Age: 45
End: 2020-09-08

## 2020-09-08 NOTE — TELEPHONE ENCOUNTER
[facility] Approval of MRI's    Cervical - [id number]  Brain  - [id number]  Valid 9/8/2020 - 10/23/2020    Scheduled by Cadence Christine @ [facility] for 9/21/2020  Electronically signed by Yaakov Knight on 9/8/20 at 12:05 PM EDT

## 2020-09-08 NOTE — TELEPHONE ENCOUNTER
Jeimy from [facility] scheduling called stating there is a radiologist recommendation that MRI cervical be done w/wo contrast (current order is for w/contrast). Jeimy would like to know if provider would change order. Pt also stated when scheduling that she has a port she would like them to access so, per Jeimy, pt will need an order for Heparin flush and port access. Forwarding to REYNOLD Gambino.   Electronically signed by Beatrice Wolf on 9/8/20 at 10:00 AM EDT

## 2020-09-09 ENCOUNTER — TELEPHONE (OUTPATIENT)
Dept: NEUROLOGY | Age: 45
End: 2020-09-09

## 2020-09-09 RX ORDER — SODIUM CHLORIDE 0.9 % (FLUSH) 0.9 %
10 SYRINGE (ML) INJECTION PRN
Qty: 20 ML | Refills: 3 | Status: SHIPPED
Start: 2020-09-09 | End: 2020-10-15

## 2020-09-09 NOTE — TELEPHONE ENCOUNTER
Pt called stating that she had went to see the eye doctor and her exam did not reveal anything. However patient states that she continues to have blurred vision, lightheadedness, neck pain, and a shocking sensation. She is scheduled for her MRIs 9/21. She Iwill call to see if her appointment can be moved up. Patient would like  to know if she should have a follow up earlier?

## 2020-09-09 NOTE — TELEPHONE ENCOUNTER
She was seen on 9/4 no need to see earlier. Please have her follow up with the MRIs as planned, if they can be moved up then that may be beneficial.  Have her continue the plan discussed during her visit. I will follow MRI results and if abnormal we will call her. Thanks.

## 2020-09-09 NOTE — TELEPHONE ENCOUNTER
Notified patient that Jesu Waldron NP had just seen her on 9/4  and there was no need to see her earlier. Pt did call and have her MRIs moved up as planned.  Instructed pt that Jesu Waldron will review the MRI's and she will call her if abnormal.

## 2020-09-10 ENCOUNTER — TELEPHONE (OUTPATIENT)
Dept: NEUROLOGY | Age: 45
End: 2020-09-10

## 2020-09-10 RX ORDER — SUCRALFATE 1 G/1
TABLET ORAL
Qty: 60 TABLET | Refills: 5 | Status: SHIPPED
Start: 2020-09-10 | End: 2020-09-11 | Stop reason: SINTOL

## 2020-09-11 RX ORDER — SUCRALFATE 1 G
TABLET ORAL
Qty: 60 TABLET | Refills: 3 | Status: SHIPPED
Start: 2020-09-11 | End: 2020-10-15

## 2020-09-15 ENCOUNTER — TELEPHONE (OUTPATIENT)
Dept: PRIMARY CARE CLINIC | Age: 45
End: 2020-09-15

## 2020-09-16 ENCOUNTER — TELEPHONE (OUTPATIENT)
Dept: PRIMARY CARE CLINIC | Age: 45
End: 2020-09-16

## 2020-09-16 ENCOUNTER — HOSPITAL ENCOUNTER (OUTPATIENT)
Dept: MRI IMAGING | Age: 45
Discharge: HOME | End: 2020-09-18
Payer: MEDICAID

## 2020-09-16 PROCEDURE — A9579 GAD-BASE MR CONTRAST NOS,1ML: HCPCS | Performed by: RADIOLOGY

## 2020-09-16 PROCEDURE — 72142 MRI NECK SPINE W/DYE: CPT

## 2020-09-16 PROCEDURE — 6360000002 HC RX W HCPCS: Performed by: NURSE PRACTITIONER

## 2020-09-16 PROCEDURE — 70552 MRI BRAIN STEM W/DYE: CPT

## 2020-09-16 PROCEDURE — 6360000004 HC RX CONTRAST MEDICATION: Performed by: RADIOLOGY

## 2020-09-16 PROCEDURE — 2580000003 HC RX 258: Performed by: NURSE PRACTITIONER

## 2020-09-16 RX ORDER — HEPARIN SODIUM (PORCINE) LOCK FLUSH IV SOLN 100 UNIT/ML 100 UNIT/ML
100 SOLUTION INTRAVENOUS PRN
Status: DISCONTINUED | OUTPATIENT
Start: 2020-09-16 | End: 2020-09-19 | Stop reason: HOSPADM

## 2020-09-16 RX ORDER — SODIUM CHLORIDE 0.9 % (FLUSH) 0.9 %
10 SYRINGE (ML) INJECTION PRN
Status: DISCONTINUED | OUTPATIENT
Start: 2020-09-16 | End: 2020-09-19 | Stop reason: HOSPADM

## 2020-09-16 RX ADMIN — Medication 10 ML: at 15:45

## 2020-09-16 RX ADMIN — GADOTERIDOL 16 ML: 279.3 INJECTION, SOLUTION INTRAVENOUS at 15:02

## 2020-09-16 RX ADMIN — SODIUM CHLORIDE, PRESERVATIVE FREE 100 UNITS: 5 INJECTION INTRAVENOUS at 15:45

## 2020-09-16 NOTE — TELEPHONE ENCOUNTER
Last Appointment:  9/1/2020  Future Appointments   Date Time Provider Alisa Marcelinoisti   9/16/2020  4:00 PM [facility] MRI RM 1 SEYZ MRI [facility]   9/16/2020  6:00 PM [facility] MRI RM 1 SEYZ MRI [facility]   10/5/2020 11:30 AM DO Dhruv Perez [facility]   11/12/2020 11:00 AM DO Beth Perez [facility]   12/15/2020  9:00 AM REYNOLD Nick NP [facility]      Patient reports she has to have PT and ST PA before it can be scheduled. I called [facility] spoke with Vickie. Prior auth started PT ref number is [id number] and ST ref number is [id number].   We should received fax with either approval or denial.    Electronically signed by Beatrice Cool LPN on 0/66/6259 at 9:42 PM

## 2020-09-16 NOTE — PROGRESS NOTES
1500 - Patient here for MRI with contrast. Allergies reviewed. Port accessed per order and MRI notified. [address] de-accessed per order. Flushed with saline followed by heparin, positive pressure locked. No s/s of complications noted or reported.

## 2020-09-17 ENCOUNTER — TELEPHONE (OUTPATIENT)
Dept: NEUROLOGY | Age: 45
End: 2020-09-17

## 2020-09-17 NOTE — TELEPHONE ENCOUNTER
MA called patient and gave her the results of MRI's. Message left.   Electronically signed by Medford Phalen, MA on 9/17/20 at 4:13 PM EDT

## 2020-09-17 NOTE — TELEPHONE ENCOUNTER
Patient called in today. She had her MRI yesterday. She would like her results.   Electronically signed by Baylee Craven MA on 9/17/20 at 2:06 PM EDT

## 2020-09-17 NOTE — TELEPHONE ENCOUNTER
[facility] approved physical therapy. Called [facility] department and they accept patient's insurance. Referral faxed over.

## 2020-09-19 ENCOUNTER — APPOINTMENT (OUTPATIENT)
Dept: CT IMAGING | Age: 45
End: 2020-09-19
Payer: MEDICAID

## 2020-09-19 ENCOUNTER — HOSPITAL ENCOUNTER (EMERGENCY)
Age: 45
Discharge: HOME | End: 2020-09-19
Attending: EMERGENCY MEDICINE
Payer: MEDICAID

## 2020-09-19 ENCOUNTER — APPOINTMENT (OUTPATIENT)
Dept: GENERAL RADIOLOGY | Age: 45
End: 2020-09-19
Payer: MEDICAID

## 2020-09-19 VITALS
RESPIRATION RATE: 16 BRPM | BODY MASS INDEX: 33.13 KG/M2 | OXYGEN SATURATION: 98 % | WEIGHT: 180 LBS | TEMPERATURE: 97.5 F | DIASTOLIC BLOOD PRESSURE: 71 MMHG | SYSTOLIC BLOOD PRESSURE: 125 MMHG | HEIGHT: 62 IN | HEART RATE: 78 BPM

## 2020-09-19 LAB
ALBUMIN SERPL-MCNC: 5.1 G/DL (ref 3.5–5.2)
ALP BLD-CCNC: 71 U/L (ref 35–104)
ALT SERPL-CCNC: 32 U/L (ref 0–32)
ANION GAP SERPL CALCULATED.3IONS-SCNC: 14 MMOL/L (ref 7–16)
AST SERPL-CCNC: 28 U/L (ref 0–31)
BASOPHILS ABSOLUTE: 0.05 E9/L (ref 0–0.2)
BASOPHILS RELATIVE PERCENT: 0.9 % (ref 0–2)
BILIRUB SERPL-MCNC: 0.4 MG/DL (ref 0–1.2)
BILIRUBIN URINE: NEGATIVE
BLOOD, URINE: NEGATIVE
BUN BLDV-MCNC: 14 MG/DL (ref 6–20)
CALCIUM SERPL-MCNC: 10.1 MG/DL (ref 8.6–10.2)
CHLORIDE BLD-SCNC: 94 MMOL/L (ref 98–107)
CLARITY: CLEAR
CO2: 29 MMOL/L (ref 22–29)
COLOR: YELLOW
CREAT SERPL-MCNC: 0.8 MG/DL (ref 0.5–1)
EOSINOPHILS ABSOLUTE: 0.17 E9/L (ref 0.05–0.5)
EOSINOPHILS RELATIVE PERCENT: 3 % (ref 0–6)
GFR AFRICAN AMERICAN: >60
GFR NON-AFRICAN AMERICAN: >60 ML/MIN/1.73
GLUCOSE BLD-MCNC: 145 MG/DL (ref 74–99)
GLUCOSE URINE: NEGATIVE MG/DL
HCT VFR BLD CALC: 47.7 % (ref 34–48)
HEMOGLOBIN: 15.5 G/DL (ref 11.5–15.5)
IMMATURE GRANULOCYTES #: 0.02 E9/L
IMMATURE GRANULOCYTES %: 0.3 % (ref 0–5)
KETONES, URINE: NEGATIVE MG/DL
LEUKOCYTE ESTERASE, URINE: NEGATIVE
LYMPHOCYTES ABSOLUTE: 2.24 E9/L (ref 1.5–4)
LYMPHOCYTES RELATIVE PERCENT: 39.1 % (ref 20–42)
MCH RBC QN AUTO: 29.9 PG (ref 26–35)
MCHC RBC AUTO-ENTMCNC: 32.5 % (ref 32–34.5)
MCV RBC AUTO: 92.1 FL (ref 80–99.9)
MONOCYTES ABSOLUTE: 0.4 E9/L (ref 0.1–0.95)
MONOCYTES RELATIVE PERCENT: 7 % (ref 2–12)
NEUTROPHILS ABSOLUTE: 2.85 E9/L (ref 1.8–7.3)
NEUTROPHILS RELATIVE PERCENT: 49.7 % (ref 43–80)
NITRITE, URINE: NEGATIVE
PDW BLD-RTO: 13.2 FL (ref 11.5–15)
PH UA: 7 (ref 5–9)
PLATELET # BLD: 294 E9/L (ref 130–450)
PMV BLD AUTO: 11.3 FL (ref 7–12)
POTASSIUM SERPL-SCNC: 3.8 MMOL/L (ref 3.5–5)
PRO-BNP: 13 PG/ML (ref 0–125)
PROTEIN UA: NEGATIVE MG/DL
RBC # BLD: 5.18 E12/L (ref 3.5–5.5)
SODIUM BLD-SCNC: 137 MMOL/L (ref 132–146)
SPECIFIC GRAVITY UA: 1.01 (ref 1–1.03)
TOTAL PROTEIN: 8.1 G/DL (ref 6.4–8.3)
TROPONIN: <0.01 NG/ML (ref 0–0.03)
TSH SERPL DL<=0.05 MIU/L-ACNC: 1.43 UIU/ML (ref 0.27–4.2)
UROBILINOGEN, URINE: 0.2 E.U./DL
VALPROIC ACID LEVEL: 86 MCG/ML (ref 50–100)
WBC # BLD: 5.7 E9/L (ref 4.5–11.5)

## 2020-09-19 PROCEDURE — 6360000002 HC RX W HCPCS: Performed by: EMERGENCY MEDICINE

## 2020-09-19 PROCEDURE — 80164 ASSAY DIPROPYLACETIC ACD TOT: CPT

## 2020-09-19 PROCEDURE — 84443 ASSAY THYROID STIM HORMONE: CPT

## 2020-09-19 PROCEDURE — 70450 CT HEAD/BRAIN W/O DYE: CPT

## 2020-09-19 PROCEDURE — 99285 EMERGENCY DEPT VISIT HI MDM: CPT

## 2020-09-19 PROCEDURE — 96375 TX/PRO/DX INJ NEW DRUG ADDON: CPT

## 2020-09-19 PROCEDURE — 71045 X-RAY EXAM CHEST 1 VIEW: CPT

## 2020-09-19 PROCEDURE — 80053 COMPREHEN METABOLIC PANEL: CPT

## 2020-09-19 PROCEDURE — 81003 URINALYSIS AUTO W/O SCOPE: CPT

## 2020-09-19 PROCEDURE — 96374 THER/PROPH/DIAG INJ IV PUSH: CPT

## 2020-09-19 PROCEDURE — 85025 COMPLETE CBC W/AUTO DIFF WBC: CPT

## 2020-09-19 PROCEDURE — 83880 ASSAY OF NATRIURETIC PEPTIDE: CPT

## 2020-09-19 PROCEDURE — 93005 ELECTROCARDIOGRAM TRACING: CPT | Performed by: EMERGENCY MEDICINE

## 2020-09-19 PROCEDURE — 84484 ASSAY OF TROPONIN QUANT: CPT

## 2020-09-19 RX ORDER — DIPHENHYDRAMINE HYDROCHLORIDE 50 MG/ML
25 INJECTION INTRAMUSCULAR; INTRAVENOUS ONCE
Status: COMPLETED | OUTPATIENT
Start: 2020-09-19 | End: 2020-09-19

## 2020-09-19 RX ORDER — KETOROLAC TROMETHAMINE 30 MG/ML
15 INJECTION, SOLUTION INTRAMUSCULAR; INTRAVENOUS ONCE
Status: COMPLETED | OUTPATIENT
Start: 2020-09-19 | End: 2020-09-19

## 2020-09-19 RX ORDER — PROCHLORPERAZINE EDISYLATE 5 MG/ML
10 INJECTION INTRAMUSCULAR; INTRAVENOUS ONCE
Status: COMPLETED | OUTPATIENT
Start: 2020-09-19 | End: 2020-09-19

## 2020-09-19 RX ADMIN — PROCHLORPERAZINE EDISYLATE 10 MG: 5 INJECTION INTRAMUSCULAR; INTRAVENOUS at 12:57

## 2020-09-19 RX ADMIN — DIPHENHYDRAMINE HYDROCHLORIDE 25 MG: 50 INJECTION, SOLUTION INTRAMUSCULAR; INTRAVENOUS at 12:57

## 2020-09-19 RX ADMIN — KETOROLAC TROMETHAMINE 15 MG: 30 INJECTION, SOLUTION INTRAMUSCULAR at 12:57

## 2020-09-19 NOTE — ED PROVIDER NOTES
(1.575 m) 180 lb (81.6 kg)      Oxygen Saturation Interpretation: Normal.    Constitutional:  Level of Consciousness: Awake and alert. ETOH: No.         Distress: none,  cooperative. Eyes:  PERRL, EOMI, no discharge or conjunctival injection. Ears:  External ears without lesions. Throat:  Pharynx without injection, exudate, or tonsillar hypertrophy. Airway patient. Neck:  Normal ROM. Supple. Respiratory:  Clear to auscultation and breath sounds equal.  CV:  Regular rate and rhythm, normal heart sounds, without pathological murmurs, ectopy, gallops, or rubs. GI:  Abdomen Soft, nontender, good bowel sounds. No firm or pulsatile mass. Back:  No costovertebral tenderness. Integument:  Normal turgor. Warm, dry, without visible rash, unless noted elsewhere. Lymphatic: no lymphadenopathy noted  Neurological:  Oriented. Motor functions intact.     Lab / Imaging Results   (All laboratory and radiology results have been personally reviewed by myself)  Labs:  Results for orders placed or performed during the hospital encounter of 09/19/20   CBC Auto Differential   Result Value Ref Range    WBC 5.7 4.5 - 11.5 E9/L    RBC 5.18 3.50 - 5.50 E12/L    Hemoglobin 15.5 11.5 - 15.5 g/dL    Hematocrit 47.7 34.0 - 48.0 %    MCV 92.1 80.0 - 99.9 fL    MCH 29.9 26.0 - 35.0 pg    MCHC 32.5 32.0 - 34.5 %    RDW 13.2 11.5 - 15.0 fL    Platelets 227 132 - 834 E9/L    MPV 11.3 7.0 - 12.0 fL    Neutrophils % 49.7 43.0 - 80.0 %    Immature Granulocytes % 0.3 0.0 - 5.0 %    Lymphocytes % 39.1 20.0 - 42.0 %    Monocytes % 7.0 2.0 - 12.0 %    Eosinophils % 3.0 0.0 - 6.0 %    Basophils % 0.9 0.0 - 2.0 %    Neutrophils Absolute 2.85 1.80 - 7.30 E9/L    Immature Granulocytes # 0.02 E9/L    Lymphocytes Absolute 2.24 1.50 - 4.00 E9/L    Monocytes Absolute 0.40 0.10 - 0.95 E9/L    Eosinophils Absolute 0.17 0.05 - 0.50 E9/L    Basophils Absolute 0.05 0.00 - 0.20 E9/L   Comprehensive Metabolic Panel   Result Value Ref Range    Sodium 137 132 - 146 mmol/L    Potassium 3.8 3.5 - 5.0 mmol/L    Chloride 94 (L) 98 - 107 mmol/L    CO2 29 22 - 29 mmol/L    Anion Gap 14 7 - 16 mmol/L    Glucose 145 (H) 74 - 99 mg/dL    BUN 14 6 - 20 mg/dL    CREATININE 0.8 0.5 - 1.0 mg/dL    GFR Non-African American >60 >=60 mL/min/1.73    GFR African American >60     Calcium 10.1 8.6 - 10.2 mg/dL    Total Protein 8.1 6.4 - 8.3 g/dL    Alb 5.1 3.5 - 5.2 g/dL    Total Bilirubin 0.4 0.0 - 1.2 mg/dL    Alkaline Phosphatase 71 35 - 104 U/L    ALT 32 0 - 32 U/L    AST 28 0 - 31 U/L   Troponin   Result Value Ref Range    Troponin <0.01 0.00 - 0.03 ng/mL   Brain Natriuretic Peptide   Result Value Ref Range    Pro-BNP 13 0 - 125 pg/mL   Urinalysis   Result Value Ref Range    Color, UA Yellow Straw/Yellow    Clarity, UA Clear Clear    Glucose, Ur Negative Negative mg/dL    Bilirubin Urine Negative Negative    Ketones, Urine Negative Negative mg/dL    Specific Gravity, UA 1.010 1.005 - 1.030    Blood, Urine Negative Negative    pH, UA 7.0 5.0 - 9.0    Protein, UA Negative Negative mg/dL    Urobilinogen, Urine 0.2 <2.0 E.U./dL    Nitrite, Urine Negative Negative    Leukocyte Esterase, Urine Negative Negative   TSH without Reflex   Result Value Ref Range    TSH 1.430 0.270 - 4.200 uIU/mL     Imaging: All Radiology results interpreted by Radiologist unless otherwise noted. CT HEAD WO CONTRAST   Final Result      NO ACUTE INTRACRANIAL PROCESS         XR CHEST PORTABLE   Final Result   No acute cardiopulmonary findings. EKG #1:  Interpreted by emergency department physician unless otherwise noted. Time:  12:21    Rate: 91  Rhythm: Sinus. Interpretation: normal sinus rhythm.     ED Course / Medical Decision Making     Medications   ketorolac (TORADOL) injection 15 mg (15 mg Intravenous Given 9/19/20 1257)   prochlorperazine (COMPAZINE) injection 10 mg (10 mg Intravenous Given 9/19/20 1257)   diphenhydrAMINE (BENADRYL) injection 25 mg (25 mg Intravenous Given 9/19/20 1257) Re-Evaluations:  9/19/20       Patients symptoms are improving. Consultations:             None    Procedures:   none    MDM: Patient presents to the emergency department for evaluation of chronic dizziness. CT head negative for acute pathology. Cardiac work-up negative. Patient's headache was better on reevaluation. She was reassured and advised to keep her scheduled follow-up appointment with neurology. Counseling:   I have spoken with the patient and discussed todays results, in addition to providing specific details for the plan of care and counseling regarding the diagnosis and prognosis and are agreeable with the plan. Assessment      1. Dizziness    2. Acute nonintractable headache, unspecified headache type      This patient's ED course included: a personal history and physicial examination  This patient has remained hemodynamically stable during their ED course. Plan   Discharge to home. Patient condition is stable. New Medications     Discharge Medication List as of 9/19/2020  2:33 PM        Electronically signed by Jaiden Silva DO   DD: 9/19/20  **This report was transcribed using voice recognition software. Every effort was made to ensure accuracy; however, inadvertent computerized transcription errors may be present.   END OF PROVIDER NOTE        Jaiden Silva DO  09/19/20 [id number]

## 2020-09-19 NOTE — ED NOTES
Bed: 12  Expected date:   Expected time:   Means of arrival:   Comments:  Triage     Rajan Dunham RN  09/19/20 [id number]

## 2020-09-20 LAB
EKG ATRIAL RATE: 91 BPM
EKG P AXIS: 51 DEGREES
EKG P-R INTERVAL: 152 MS
EKG Q-T INTERVAL: 374 MS
EKG QRS DURATION: 74 MS
EKG QTC CALCULATION (BAZETT): 460 MS
EKG R AXIS: 23 DEGREES
EKG T AXIS: 30 DEGREES
EKG VENTRICULAR RATE: 91 BPM

## 2020-09-20 PROCEDURE — 93010 ELECTROCARDIOGRAM REPORT: CPT | Performed by: INTERNAL MEDICINE

## 2020-09-21 ENCOUNTER — TELEPHONE (OUTPATIENT)
Dept: PRIMARY CARE CLINIC | Age: 45
End: 2020-09-21

## 2020-09-21 NOTE — TELEPHONE ENCOUNTER
Received call from Jennifer Marks from [facility] regarding speech therapy for patient. Insurance does not require prior auth. Will fax referral over to [address] [place] [address] where she is going to PT.     Electronically signed by Patti Garcia LPN on 9/17/1975 at 66:97 AM

## 2020-09-23 ENCOUNTER — TELEPHONE (OUTPATIENT)
Dept: ADMINISTRATIVE | Age: 45
End: 2020-09-23

## 2020-09-24 ENCOUNTER — OFFICE VISIT (OUTPATIENT)
Dept: FAMILY MEDICINE CLINIC | Age: 45
End: 2020-09-24
Payer: MEDICAID

## 2020-09-24 VITALS
TEMPERATURE: 98.5 F | HEART RATE: 74 BPM | DIASTOLIC BLOOD PRESSURE: 70 MMHG | SYSTOLIC BLOOD PRESSURE: 128 MMHG | OXYGEN SATURATION: 98 %

## 2020-09-24 LAB
BILIRUBIN, POC: NORMAL
BLOOD URINE, POC: NORMAL
CLARITY, POC: CLEAR
COLOR, POC: YELLOW
GLUCOSE URINE, POC: NORMAL
KETONES, POC: NORMAL
LEUKOCYTE EST, POC: NORMAL
NITRITE, POC: NORMAL
PH, POC: 8.5
PROTEIN, POC: NORMAL
SPECIFIC GRAVITY, POC: 1.01
UROBILINOGEN, POC: 0.2

## 2020-09-24 PROCEDURE — 1036F TOBACCO NON-USER: CPT | Performed by: PHYSICIAN ASSISTANT

## 2020-09-24 PROCEDURE — 81002 URINALYSIS NONAUTO W/O SCOPE: CPT | Performed by: PHYSICIAN ASSISTANT

## 2020-09-24 PROCEDURE — 99214 OFFICE O/P EST MOD 30 MIN: CPT | Performed by: PHYSICIAN ASSISTANT

## 2020-09-24 PROCEDURE — G8417 CALC BMI ABV UP PARAM F/U: HCPCS | Performed by: PHYSICIAN ASSISTANT

## 2020-09-24 PROCEDURE — G8427 DOCREV CUR MEDS BY ELIG CLIN: HCPCS | Performed by: PHYSICIAN ASSISTANT

## 2020-09-24 NOTE — PROGRESS NOTES
Monday,Tuesday,Wednesday,Thursday,Friday, Disp: 90 tablet, Rfl: 1    levothyroxine (SYNTHROID) 50 MCG tablet, Take 2 tablets by mouth Twice a Week Given Saturday,Sunday, Disp: 24 tablet, Rfl: 1    pantoprazole (PROTONIX) 40 MG tablet, Take 1 tablet by mouth every morning (before breakfast), Disp: 90 tablet, Rfl: 1    folic acid (FOLVITE) 1 MG tablet, Take 1 tablet by mouth Daily with lunch, Disp: 90 tablet, Rfl: 1    acetaminophen (TYLENOL) 325 MG tablet, Take 650 mg by mouth every 6 hours as needed for Pain, Disp: , Rfl:     sodium chloride (OCEAN, BABY AYR) 0.65 % nasal spray, 1 spray by Nasal route 4 times daily as needed for Congestion, Disp: , Rfl:     azelastine (ASTELIN) 0.1 % nasal spray, 1 spray by Nasal route 2 times daily, Disp: , Rfl:     diphenhydrAMINE (BENYLIN) 12.5 MG/5ML liquid, Take 25 mg by mouth 4 times daily as needed for Allergies (migrane break through), Disp: , Rfl:     Artificial Saliva (BIOTENE DRY MOUTH MOISTURIZING MT), Take 1 spray by mouth every 3 hours, Disp: , Rfl:     clotrimazole (LOTRIMIN) 1 % cream, Apply topically 2 times daily as needed Under breast and abdominal folds, Disp: , Rfl:     OIL OF OREGANO PO, Take 60 mg by mouth daily (with breakfast), Disp: , Rfl:     Bacillus Coagulans-Inulin (PROBIOTIC FORMULA) 1-250 BILLION-MG CAPS, Take 1 capsule by mouth nightly, Disp: , Rfl:     miconazole (MICOTIN) 2 % powder, Apply topically daily Apply to groin, under breast and skin folds. , Disp: , Rfl:     magnesium hydroxide (MILK OF MAGNESIA) 400 MG/5ML suspension, Take 30 mLs by mouth 4 times daily as needed for Constipation, Disp: , Rfl:     NONFORMULARY, Apply topically daily as needed (joint pain) Natural T Relief Pain Cream, Disp: , Rfl:     SENNA LEAVES PO, Take 470 mg by mouth three times daily , Disp: , Rfl:     promethazine (PHENERGAN) 25 MG tablet, Take 25 mg by mouth every 6 hours as needed for Nausea, Disp: , Rfl:     Magnesium 500 MG CAPS, Take 500 mg by mouth 2 times daily, Disp: , Rfl:     tiZANidine (ZANAFLEX) 2 MG tablet, Take 2 mg by mouth nightly, Disp: , Rfl:     triamcinolone (KENALOG) 0.1 % cream, Apply topically daily as needed (rash), Disp: , Rfl:     tetrahydrozoline 0.05 % ophthalmic solution, Place 2 drops into both eyes 3 times daily, Disp: , Rfl:     NONFORMULARY, Take 2 capsules by mouth 2 times daily (with meals) Turmeric Ginger, Disp: , Rfl:     potassium chloride (KLOR-CON) 20 MEQ packet, Take 20 mEq by mouth daily, Disp: 90 packet, Rfl: 2    propranolol (INDERAL) 10 MG tablet, Take 10 mg by mouth 3 times daily, Disp: , Rfl:     levomilnacipran (FETZIMA) 40 MG CP24 extended release capsule, Take 40 mg by mouth daily, Disp: , Rfl:     tiZANidine (ZANAFLEX) 2 MG tablet, Take 1 tablet by mouth 2 times daily (Patient taking differently: Take 2 mg by mouth 2 times daily Taking 0.5 - 1 tab if needed), Disp: 180 tablet, Rfl: 2    Immune Globulin, Human, (GAMMAGARD) 10 GM/100ML SOLN solution, 40 g by Intravenous (Continuous Infusion) route every 30 days , Disp: , Rfl:     Galcanezumab-gnlm (EMGALITY) 120 MG/ML SOAJ, Inject 120 mg into the skin every 30 days , Disp: , Rfl:     clonazePAM (KLONOPIN) 0.5 MG tablet, Take 0.5 mg by mouth 2 times daily. ., Disp: , Rfl:     divalproex (DEPAKOTE) 250 MG DR tablet, Take 250 mg by mouth See Admin Instructions 250 mg -  mg - Afternoon 500 mg - PM, Disp: , Rfl:     econazole nitrate 1 % cream, Apply topically daily as needed (Apply under breasts and folds) , Disp: , Rfl:     fluticasone (FLONASE) 50 MCG/ACT nasal spray, 1 spray by Nasal route 2 times daily , Disp: , Rfl:     fluticasone-salmeterol (ADVAIR HFA) 230-21 MCG/ACT inhaler, Inhale 2 puffs into the lungs 2 times daily, Disp: , Rfl:     furosemide (LASIX) 20 MG tablet, Take 20 mg by mouth daily, Disp: , Rfl:     insulin lispro (HUMALOG KWIKPEN) 100 UNIT/ML pen, Inject 0-10 Units into the skin 3 times daily (before meals) *Per Sliding Scale*, Disp: , Rfl:     insulin glargine (LANTUS) 100 UNIT/ML injection vial, Inject 0-6 Units into the skin 2 times daily 2 units -AM 6 units - PM, Disp: , Rfl:     ipratropium-albuterol (DUONEB) 0.5-2.5 (3) MG/3ML SOLN nebulizer solution, Inhale 1 vial into the lungs every 4 hours as needed for Shortness of Breath , Disp: , Rfl:     montelukast (SINGULAIR) 10 MG tablet, Take 10 mg by mouth nightly, Disp: , Rfl:     ondansetron (ZOFRAN-ODT) 8 MG TBDP disintegrating tablet, Take 8 mg by mouth every 8 hours as needed for Nausea or Vomiting , Disp: , Rfl:     polyethylene glycol (GLYCOLAX) 17 g packet, Take 17 g by mouth 2 times daily as needed , Disp: , Rfl:    Allergies   Allergen Reactions    Diamox [Acetazolamide] Shortness Of Breath and Rash    Abilify [Aripiprazole]     Adhesive Tape     Aspartame And Phenylalanine     Cymbalta [Duloxetine Hcl]     Darvon [Propoxyphene]      darvocet    Dilaudid [Hydromorphone Hcl]     Doxepin     Doxycycline     Effexor [Venlafaxine]     Grapeseed Extract [Nutritional Supplements]      grapes    Hydrochlorothiazide     Iron     Levaquin [Levofloxacin In D5w]     Other      strawberries    Pecan Nut (Diagnostic)     Red Dye     Reglan [Metoclopramide]     Rocephin [Ceftriaxone]     Rosemary Oil     Sulfa Antibiotics     Tetracyclines & Related     Topamax [Topiramate]     Tramadol     Triptans     Tylenol [Acetaminophen]     [place] [Macadamia Nut Oil]     Zithromax [Azithromycin]         Objective:  Vitals:    09/24/20 1405   BP: 128/70   Pulse: 74   Temp: 98.5 °F (36.9 °C)   SpO2: 98%        Exam:  Const: Appears healthy and well developed. Ill-appearing vitals reviewed per triage. Head/Face: Normocephalic, atraumatic. Facies is symmetric. Eyes: PERRL. ENMT:  Tympanic membranes are pearly gray with good light reflex bilaterally. Nares are patent. Buccal mucosa was mildly dry  posterior pharynx shows no exudate, irritation or redness. Neck: Supple and symmetric. Palpation reveals no adenopathy. Trachea midline. Resp: Lungs are clear bilaterally. CV: Rhythm is regular. S1 is normal. S2 is normal.  Abdomen: Abdomen soft, diffuse tenderness to palpation in the low abdomen is noted nondistended with BSPx4. No abdominal guarding, rebound tenderness peritoneal signs masses or organomegally noted. Positive CVA tenderness on the right negative on the left  Musculo: Walks with a normal gait. Patient moves extremities without pain or limitation. Pulses are equal bilaterally. Skin: Skin is warm and dry. Neuro: Alert and oriented x3. Speech is articulate and fluent. Psych: Patient's mood and affect is appropriate     Kylee Lima was seen today for urinary tract infection, abdominal pain, flank pain and nausea & vomiting. Diagnoses and all orders for this visit:    Urine frequency  -     POCT Urinalysis no Micro    Abdominal pain, unspecified abdominal location    Flank pain    Intractable vomiting with nausea, unspecified vomiting type    Fever, unspecified fever cause    I do feel based on the patient's history and physical exam further evaluation is warranted in the emergency department. Therefore patient was sent to [facility] emergency department for further evaluation and treatment    Return for To ER for evaluation and management.     Seen By:    PHYLLIS Salazar

## 2020-09-29 ENCOUNTER — APPOINTMENT (OUTPATIENT)
Dept: CT IMAGING | Age: 45
End: 2020-09-29
Payer: MEDICAID

## 2020-09-29 ENCOUNTER — HOSPITAL ENCOUNTER (EMERGENCY)
Age: 45
Discharge: HOME | End: 2020-09-29
Attending: EMERGENCY MEDICINE
Payer: MEDICAID

## 2020-09-29 VITALS
DIASTOLIC BLOOD PRESSURE: 63 MMHG | RESPIRATION RATE: 20 BRPM | HEIGHT: 62 IN | BODY MASS INDEX: 32.76 KG/M2 | TEMPERATURE: 97.9 F | WEIGHT: 178 LBS | SYSTOLIC BLOOD PRESSURE: 116 MMHG | OXYGEN SATURATION: 100 % | HEART RATE: 80 BPM

## 2020-09-29 LAB
ANION GAP SERPL CALCULATED.3IONS-SCNC: 7 MMOL/L (ref 7–16)
BACTERIA: NORMAL /HPF
BASOPHILS ABSOLUTE: 0.03 E9/L (ref 0–0.2)
BASOPHILS RELATIVE PERCENT: 0.5 % (ref 0–2)
BILIRUBIN URINE: NEGATIVE
BLOOD, URINE: NEGATIVE
BUN BLDV-MCNC: 12 MG/DL (ref 6–20)
CALCIUM SERPL-MCNC: 8.9 MG/DL (ref 8.6–10.2)
CHLORIDE BLD-SCNC: 104 MMOL/L (ref 98–107)
CLARITY: CLEAR
CO2: 28 MMOL/L (ref 22–29)
COLOR: YELLOW
CREAT SERPL-MCNC: 0.7 MG/DL (ref 0.5–1)
EOSINOPHILS ABSOLUTE: 0.05 E9/L (ref 0.05–0.5)
EOSINOPHILS RELATIVE PERCENT: 0.9 % (ref 0–6)
EPITHELIAL CELLS, UA: NORMAL /HPF
GFR AFRICAN AMERICAN: >60
GFR NON-AFRICAN AMERICAN: >60 ML/MIN/1.73
GLUCOSE BLD-MCNC: 170 MG/DL (ref 74–99)
GLUCOSE URINE: NEGATIVE MG/DL
HCG(URINE) PREGNANCY TEST: NEGATIVE
HCT VFR BLD CALC: 39.6 % (ref 34–48)
HEMOGLOBIN: 12.6 G/DL (ref 11.5–15.5)
IMMATURE GRANULOCYTES #: 0.02 E9/L
IMMATURE GRANULOCYTES %: 0.4 % (ref 0–5)
KETONES, URINE: NEGATIVE MG/DL
LEUKOCYTE ESTERASE, URINE: NEGATIVE
LYMPHOCYTES ABSOLUTE: 2.26 E9/L (ref 1.5–4)
LYMPHOCYTES RELATIVE PERCENT: 39.8 % (ref 20–42)
MCH RBC QN AUTO: 30.4 PG (ref 26–35)
MCHC RBC AUTO-ENTMCNC: 31.8 % (ref 32–34.5)
MCV RBC AUTO: 95.7 FL (ref 80–99.9)
MONOCYTES ABSOLUTE: 0.39 E9/L (ref 0.1–0.95)
MONOCYTES RELATIVE PERCENT: 6.9 % (ref 2–12)
NEUTROPHILS ABSOLUTE: 2.93 E9/L (ref 1.8–7.3)
NEUTROPHILS RELATIVE PERCENT: 51.5 % (ref 43–80)
NITRITE, URINE: NEGATIVE
PDW BLD-RTO: 13.2 FL (ref 11.5–15)
PH UA: 6.5 (ref 5–9)
PLATELET # BLD: 186 E9/L (ref 130–450)
PMV BLD AUTO: 11.2 FL (ref 7–12)
POTASSIUM REFLEX MAGNESIUM: 4.1 MMOL/L (ref 3.5–5)
PROTEIN UA: NEGATIVE MG/DL
RBC # BLD: 4.14 E12/L (ref 3.5–5.5)
RBC UA: NORMAL /HPF (ref 0–2)
SODIUM BLD-SCNC: 139 MMOL/L (ref 132–146)
SPECIFIC GRAVITY UA: 1.01 (ref 1–1.03)
UROBILINOGEN, URINE: 0.2 E.U./DL
WBC # BLD: 5.7 E9/L (ref 4.5–11.5)
WBC UA: NORMAL /HPF (ref 0–5)

## 2020-09-29 PROCEDURE — 81001 URINALYSIS AUTO W/SCOPE: CPT

## 2020-09-29 PROCEDURE — 2580000003 HC RX 258: Performed by: STUDENT IN AN ORGANIZED HEALTH CARE EDUCATION/TRAINING PROGRAM

## 2020-09-29 PROCEDURE — 36415 COLL VENOUS BLD VENIPUNCTURE: CPT

## 2020-09-29 PROCEDURE — 87088 URINE BACTERIA CULTURE: CPT

## 2020-09-29 PROCEDURE — 81025 URINE PREGNANCY TEST: CPT

## 2020-09-29 PROCEDURE — 6360000002 HC RX W HCPCS: Performed by: STUDENT IN AN ORGANIZED HEALTH CARE EDUCATION/TRAINING PROGRAM

## 2020-09-29 PROCEDURE — 87040 BLOOD CULTURE FOR BACTERIA: CPT

## 2020-09-29 PROCEDURE — 80048 BASIC METABOLIC PNL TOTAL CA: CPT

## 2020-09-29 PROCEDURE — 85025 COMPLETE CBC W/AUTO DIFF WBC: CPT

## 2020-09-29 PROCEDURE — 99283 EMERGENCY DEPT VISIT LOW MDM: CPT

## 2020-09-29 PROCEDURE — 74176 CT ABD & PELVIS W/O CONTRAST: CPT

## 2020-09-29 PROCEDURE — 96374 THER/PROPH/DIAG INJ IV PUSH: CPT

## 2020-09-29 RX ORDER — KETOROLAC TROMETHAMINE 30 MG/ML
15 INJECTION, SOLUTION INTRAMUSCULAR; INTRAVENOUS ONCE
Status: COMPLETED | OUTPATIENT
Start: 2020-09-29 | End: 2020-09-29

## 2020-09-29 RX ORDER — 0.9 % SODIUM CHLORIDE 0.9 %
1000 INTRAVENOUS SOLUTION INTRAVENOUS ONCE
Status: COMPLETED | OUTPATIENT
Start: 2020-09-29 | End: 2020-09-29

## 2020-09-29 RX ADMIN — SODIUM CHLORIDE 1000 ML: 9 INJECTION, SOLUTION INTRAVENOUS at 16:37

## 2020-09-29 RX ADMIN — KETOROLAC TROMETHAMINE 15 MG: 30 INJECTION, SOLUTION INTRAMUSCULAR; INTRAVENOUS at 16:56

## 2020-09-29 ASSESSMENT — ENCOUNTER SYMPTOMS
COUGH: 0
SINUS PRESSURE: 0
CONSTIPATION: 0
EYE DISCHARGE: 0
ABDOMINAL DISTENTION: 0
SORE THROAT: 0
WHEEZING: 0
EYE REDNESS: 0
VOMITING: 0
BACK PAIN: 0
DIARRHEA: 0
NAUSEA: 0
ABDOMINAL PAIN: 1
SHORTNESS OF BREATH: 0
EYE PAIN: 0

## 2020-09-29 ASSESSMENT — PAIN SCALES - GENERAL
PAINLEVEL_OUTOF10: 6
PAINLEVEL_OUTOF10: 6

## 2020-09-29 NOTE — ED PROVIDER NOTES
[facility]  Department of Emergency Medicine     Written by: Tommy Lawson DO  Patient Name: Ever Doherty  Attending Provider: Joleen Vang MD  Admit Date: 2020  3:05 PM  MRN: [id number]                   : 1975        Chief Complaint   Patient presents with    Fever     being treated for a UTI    Back Pain    - Chief complaint    Patient is a 79-year-old female past medical history of type 2 diabetes, fibromyalgia, PAULINE, hypothyroidism, hypertension, and IBS. Patient presents with chief complaint of chronic flank pain and dysuria. Patient states that since July she has had episodes of flank pain and dysuria. In addition she states that she has had malodor in her urine. She notes that she has been evaluated multiple times at [facility] most recently last week where CT scan was performed which was negative for kidney stones. Patient states that she has had multiple urinalysis is obtained with some indicate infection. Patient is currently on Macrobid day 5 of 7. Patient states is compliant with her medication. In addition patient notes subjective fevers and chills. She states that her flank pain is described as a sharpness currently rated 6 out of 10 radiates into her pelvic area. Patient states that she has been able to eat and drink and has been increasing her fluid intake. Patient denies any nausea, vomiting, chest pain, cough or shortness of breath. The history is provided by the patient. No  was used. Review of Systems   Constitutional: Negative for chills and fever. HENT: Negative for ear pain, sinus pressure and sore throat. Eyes: Negative for pain, discharge and redness. Respiratory: Negative for cough, shortness of breath and wheezing. Cardiovascular: Negative for chest pain. Gastrointestinal: Positive for abdominal pain.  Negative for abdominal distention, constipation, diarrhea, nausea and vomiting. Genitourinary: Positive for dysuria and urgency. Negative for decreased urine volume, frequency and vaginal pain. Musculoskeletal: Negative for arthralgias and back pain. Skin: Negative for rash and wound. Neurological: Negative for weakness and headaches. Hematological: Negative for adenopathy. All other systems reviewed and are negative. Physical Exam  Vitals signs and nursing note reviewed. Constitutional:       General: She is not in acute distress. Appearance: Normal appearance. HENT:      Head: Normocephalic and atraumatic. Nose: No congestion or rhinorrhea. Mouth/Throat:      Mouth: Mucous membranes are moist.      Pharynx: Oropharynx is clear. Eyes:      Extraocular Movements: Extraocular movements intact. Pupils: Pupils are equal, round, and reactive to light. Neck:      Musculoskeletal: Normal range of motion. No neck rigidity or muscular tenderness. Cardiovascular:      Rate and Rhythm: Regular rhythm. Tachycardia present. Heart sounds: No murmur. No gallop. Pulmonary:      Effort: Pulmonary effort is normal. No respiratory distress. Breath sounds: No wheezing, rhonchi or rales. Abdominal:      General: Abdomen is flat. Palpations: Abdomen is soft. There is no mass. Tenderness: There is no abdominal tenderness. There is right CVA tenderness and left CVA tenderness. There is no guarding. Hernia: No hernia is present. Musculoskeletal: Normal range of motion. General: No swelling, tenderness or signs of injury. Skin:     General: Skin is warm and dry. Capillary Refill: Capillary refill takes less than 2 seconds. Comments: Small subcutaneous nodule left lower quadrant abdominal wall   Neurological:      General: No focal deficit present. Mental Status: She is alert and oriented to person, place, and time. Mental status is at baseline.    Psychiatric:         Mood and Affect: Mood normal. Procedures       MDM  Number of Diagnoses or Management Options  Flank pain:   Hyperglycemia:   Diagnosis management comments: Patient 51-year-old female presented chief complaint of bilateral flank pain. Currently being treated for possible UTI vital signs stable on presentation. Physical exam patient with left CVA tenderness. Laboratory work obtained demonstrated hyperglycemia, no leukocytosis. Urinalysis obtained not demonstrate signs of infection. CT scan of the abdomen pelvis noncontrast was obtained which demonstrated fullness in the left kidney consistent possible recently passed kidney stone. In addition there was a incidental finding of a subcutaneous nodule in the abdominal wall. Findings consistent with left flank pain secondary to possible recently passed kidney stone. Discussed results with patient including abnormal nodule patient was instructed to monitor for additional growth or pain associated and also to follow-up with primary care doctor. Discharge discussed with patient, patient was instructed to increase fluids and use Tylenol as needed for pain. In addition patient was instructed to finish her previously scribed antibiotics. Patient was instructed if she did not notice any improvement symptoms she should follow-up with her primary care doctor in 3 days. Patient notes any new or worrisome symptoms she return emergency department for evaluation at that time. Plan of care discussed with patient, all questions were answered patient voiced understanding plan of care was discharged home in stable condition.        Amount and/or Complexity of Data Reviewed  Clinical lab tests: ordered and reviewed  Tests in the radiology section of CPT®: ordered and reviewed    Risk of Complications, Morbidity, and/or Mortality  Presenting problems: moderate  Diagnostic procedures: moderate  Management options: moderate    Patient Progress  Patient progress: stable       ED Course as of Sep 29 1802 Tue Sep 29, 2020   1759 Discussed laboratory and CT findings with patient. Patient is informed of abnormal subcutaneous nodule. Patient states this is near site where she injects her Trulicity. Patient instructed to continue to monitor for any new symptoms and to follow-up with primary care doctor. [BP]      ED Course User Index  [BP] Lisa Sims, DO        --------------------------------------------- PAST HISTORY ---------------------------------------------  Past Medical History:  has a past medical history of Anemia, Anxiety, Asthma, Blind, Connective tissue disease ([address] Cyst of right breast, DDD (degenerative disc disease), cervical, Depression, Diabetes mellitus ([address] Dysphagia, Fibromyalgia, Gastroparesis, GERD (gastroesophageal reflux disease), Headache, Hematuria, Hyperlipidemia, Hypertension, Hypothyroidism, IBS (irritable bowel syndrome), Immune deficiency disorder ([address] Insomnia, Kidney stones, Meningitis, PAULINE on CPAP, PCOS (polycystic ovarian syndrome), POTS (postural orthostatic tachycardia syndrome), Pseudotumor cerebri, Raynaud's disease, Rectal bleeding, RLS (restless legs syndrome), Scleroderma ([address] Sjogren's disease ([address] Thyroid nodule, and TIA (transient ischemic attack). Past Surgical History:  has a past surgical history that includes Hysterectomy; Cholecystectomy; Appendectomy; Carpal tunnel release (Bilateral); Port Surgery; and Pyloroplasty. Social History:  reports that she has never smoked. She has never used smokeless tobacco. She reports that she does not drink alcohol or use drugs. Family History: family history includes Alzheimer's Disease in her father, mother, paternal aunt, and paternal uncle; Hypertension in her brother; Osteoporosis in her sister; Stroke in her sister. The patients home medications have been reviewed. Allergies: Diamox [acetazolamide]; Abilify [aripiprazole];  Adhesive tape; Aspartame and phenylalanine; Cymbalta [duloxetine hcl]; Darvon [propoxyphene]; Dilaudid [hydromorphone hcl]; Doxepin; Doxycycline; Effexor [venlafaxine]; Grapeseed extract [nutritional supplements]; Hydrochlorothiazide; Iron; Levaquin [levofloxacin in d5w]; Other; Pecan nut (diagnostic); Red dye; Reglan [metoclopramide]; Rocephin [ceftriaxone]; Rosemary oil; Sulfa antibiotics; Tetracyclines & related; Topamax [topiramate]; Tramadol; Triptans; Tylenol [acetaminophen];  [place] Stain nut oil]; and Zithromax [azithromycin]    -------------------------------------------------- RESULTS -------------------------------------------------  Labs:  Results for orders placed or performed during the hospital encounter of 09/29/20   CBC auto differential   Result Value Ref Range    WBC 5.7 4.5 - 11.5 E9/L    RBC 4.14 3.50 - 5.50 E12/L    Hemoglobin 12.6 11.5 - 15.5 g/dL    Hematocrit 39.6 34.0 - 48.0 %    MCV 95.7 80.0 - 99.9 fL    MCH 30.4 26.0 - 35.0 pg    MCHC 31.8 (L) 32.0 - 34.5 %    RDW 13.2 11.5 - 15.0 fL    Platelets 241 860 - 838 E9/L    MPV 11.2 7.0 - 12.0 fL    Neutrophils % 51.5 43.0 - 80.0 %    Immature Granulocytes % 0.4 0.0 - 5.0 %    Lymphocytes % 39.8 20.0 - 42.0 %    Monocytes % 6.9 2.0 - 12.0 %    Eosinophils % 0.9 0.0 - 6.0 %    Basophils % 0.5 0.0 - 2.0 %    Neutrophils Absolute 2.93 1.80 - 7.30 E9/L    Immature Granulocytes # 0.02 E9/L    Lymphocytes Absolute 2.26 1.50 - 4.00 E9/L    Monocytes Absolute 0.39 0.10 - 0.95 E9/L    Eosinophils Absolute 0.05 0.05 - 0.50 E9/L    Basophils Absolute 0.03 0.00 - 0.20 E9/L   Urinalysis with Microscopic   Result Value Ref Range    Color, UA Yellow Straw/Yellow    Clarity, UA Clear Clear    Glucose, Ur Negative Negative mg/dL    Bilirubin Urine Negative Negative    Ketones, Urine Negative Negative mg/dL    Specific Gravity, UA 1.010 1.005 - 1.030    Blood, Urine Negative Negative    pH, UA 6.5 5.0 - 9.0    Protein, UA Negative Negative mg/dL    Urobilinogen, Urine 0.2 <2.0 E.U./dL    Nitrite, Urine Negative Negative Leukocyte Esterase, Urine Negative Negative    WBC, UA NONE 0 - 5 /HPF    RBC, UA NONE 0 - 2 /HPF    Epithelial Cells, UA RARE /HPF    Bacteria, UA NONE SEEN None Seen /HPF   Basic Metabolic Panel w/ Reflex to MG   Result Value Ref Range    Sodium 139 132 - 146 mmol/L    Potassium reflex Magnesium 4.1 3.5 - 5.0 mmol/L    Chloride 104 98 - 107 mmol/L    CO2 28 22 - 29 mmol/L    Anion Gap 7 7 - 16 mmol/L    Glucose 170 (H) 74 - 99 mg/dL    BUN 12 6 - 20 mg/dL    CREATININE 0.7 0.5 - 1.0 mg/dL    GFR Non-African American >60 >=60 mL/min/1.73    GFR African American >60     Calcium 8.9 8.6 - 10.2 mg/dL   Pregnancy, Urine   Result Value Ref Range    HCG(Urine) Pregnancy Test NEGATIVE NEGATIVE       Radiology:  CT ABDOMEN PELVIS WO CONTRAST Additional Contrast? None   Final Result   1. Normal size kidneys. No renal calculus, calculus in the ureters, or   calculus in the bladder lumen. 2. Minimal fullness of the collecting system of the left kidney can be   a normal variant or status post recent passage of a calculus. Please   correct clinically. 3. No acute inflammatory changes demonstrates mesenteric fat planes,   free intraperitoneal air, ascites or bowel obstruction but there is a   pattern of constipation. 4. See other comments and recommendations above described.          ------------------------- NURSING NOTES AND VITALS REVIEWED ---------------------------  Date / Time Roomed:  9/29/2020  3:05 PM  ED Bed Assignment:  18/18    The nursing notes within the ED encounter and vital signs as below have been reviewed.    /74   Pulse 80   Temp 98 °F (36.7 °C)   Resp 20   Ht 5' 2\" (1.575 m)   Wt 178 lb (80.7 kg)   SpO2 98%   BMI 32.56 kg/m²   Oxygen Saturation Interpretation: Normal      ------------------------------------------ PROGRESS NOTES ------------------------------------------  6:00 PM EDT  I have spoken with the patient and discussed todays results, in addition to providing

## 2020-09-30 ENCOUNTER — CARE COORDINATION (OUTPATIENT)
Dept: CARE COORDINATION | Age: 45
End: 2020-09-30

## 2020-10-01 LAB — URINE CULTURE, ROUTINE: NORMAL

## 2020-10-04 LAB
BLOOD CULTURE, ROUTINE: NORMAL
CULTURE, BLOOD 2: NORMAL

## 2020-10-05 ENCOUNTER — OFFICE VISIT (OUTPATIENT)
Dept: PRIMARY CARE CLINIC | Age: 45
End: 2020-10-05
Payer: MEDICAID

## 2020-10-05 VITALS
OXYGEN SATURATION: 98 % | WEIGHT: 192 LBS | HEART RATE: 87 BPM | SYSTOLIC BLOOD PRESSURE: 128 MMHG | TEMPERATURE: 97.8 F | DIASTOLIC BLOOD PRESSURE: 76 MMHG | HEIGHT: 62 IN | BODY MASS INDEX: 35.33 KG/M2

## 2020-10-05 PROBLEM — R22.2 SUBCUTANEOUS NODULE OF ABDOMINAL WALL: Status: ACTIVE | Noted: 2020-10-05

## 2020-10-05 PROBLEM — Z23 FLU VACCINE NEED: Status: ACTIVE | Noted: 2020-10-05

## 2020-10-05 PROCEDURE — G8427 DOCREV CUR MEDS BY ELIG CLIN: HCPCS | Performed by: INTERNAL MEDICINE

## 2020-10-05 PROCEDURE — 1036F TOBACCO NON-USER: CPT | Performed by: INTERNAL MEDICINE

## 2020-10-05 PROCEDURE — 99214 OFFICE O/P EST MOD 30 MIN: CPT | Performed by: INTERNAL MEDICINE

## 2020-10-05 PROCEDURE — G8417 CALC BMI ABV UP PARAM F/U: HCPCS | Performed by: INTERNAL MEDICINE

## 2020-10-05 PROCEDURE — G8482 FLU IMMUNIZE ORDER/ADMIN: HCPCS | Performed by: INTERNAL MEDICINE

## 2020-10-05 PROCEDURE — 90471 IMMUNIZATION ADMIN: CPT | Performed by: INTERNAL MEDICINE

## 2020-10-05 PROCEDURE — 90686 IIV4 VACC NO PRSV 0.5 ML IM: CPT | Performed by: INTERNAL MEDICINE

## 2020-10-05 RX ORDER — FUROSEMIDE 20 MG/1
20 TABLET ORAL DAILY
Qty: 60 TABLET | Refills: 3 | Status: SHIPPED
Start: 2020-10-05 | End: 2021-08-30

## 2020-10-05 RX ORDER — POLYETHYLENE GLYCOL 3350 17 G/17G
17 POWDER, FOR SOLUTION ORAL 2 TIMES DAILY PRN
Qty: 527 G | Refills: 2 | Status: SHIPPED
Start: 2020-10-05 | End: 2020-12-28 | Stop reason: SDUPTHER

## 2020-10-05 RX ORDER — PROPRANOLOL HYDROCHLORIDE 10 MG/1
10 TABLET ORAL 3 TIMES DAILY
Qty: 90 TABLET | Refills: 3 | Status: SHIPPED
Start: 2020-10-05 | End: 2021-01-25

## 2020-10-05 ASSESSMENT — ENCOUNTER SYMPTOMS
DIARRHEA: 1
SORE THROAT: 0
SHORTNESS OF BREATH: 0
PHOTOPHOBIA: 0
WHEEZING: 0
ABDOMINAL PAIN: 0
COUGH: 0
NAUSEA: 1
ANAL BLEEDING: 0
STRIDOR: 0
FACIAL SWELLING: 0
CONSTIPATION: 1
EYE PAIN: 0
EYE ITCHING: 0
EYE DISCHARGE: 0
BLOOD IN STOOL: 0
RHINORRHEA: 0
COLOR CHANGE: 0
VOMITING: 0
TROUBLE SWALLOWING: 0

## 2020-10-05 NOTE — PROGRESS NOTES
10/5/2020    Name: Misha Del Castillo : 1975 Sex: female  Age: 45 y.o. Subjective:  Chief Complaint   Patient presents with    1 Month Follow-Up        HPI     Patient was hospitalized at [facility] with abdominal pain. CT of the abdomen showed that she she could have passed a stone. There was also a subcutaneous nodule that will need to be addressed. She saw Dr. Rafael Shaw for her constipation and he tried her on. Trulance. Reviewed his note. Patient was hospitalized on ,  at [facility] and discharged on , 2020. She was initially seen at [facility] emergency department on 2020 and treated as a migraine headache. When she presented to [facility] the next day she had some right-sided weakness and she was admitted with strokelike symptoms. She was seen by neurology who felt she may have had a TIA. She was told to follow-up with Dr. Stephanie Skinner. This morning she saw Dr. Debbie Griffiths and he scheduled her for a nuclear stress test next week. Reviewed reports from [facility]. .  The week prior to that she received an infusion of IVIG as ordered by her immunologist in [place]. Since discharge she has had problems with dysphagia causing her to \"cough\". Is hard for her to swallow certain foods and she has a lot of mucus in the morning in the evening. Recommendation was for a modified barium swallow. This was done and report was noted. She also has some problems saying words so we will get speech therapy to help with this as well  She is undergoing speech therapy. Because of her weakness of her right hand, she tends to drop things and when she walks she trips over her right foot we will also get physical therapy and Occupational Therapy for her. Requisitions for both will be given to the patient and she will fax these over to her . Medication reconciliation done. Prescription management done.   She was not and cervical spondylosis    Problem #10: Migraines  She follows up with neurologist who has her on different medications. She has an accidental medication error in [facility]. They say she is on or has been on Suboxone but she never had this medication. Apparently another Sam Corcoran had this medicine but [facility] refuses to correct this per patient report    She has a history of kidney stones, hyperlipidemia, and cysts of the right breast.    She recently saw Dr. Lulu Benedict for her neurological problems and after evaluation patient decided to go back to her previous neurologist who is a nurse practitioner for Dr. Jamar Rodriges is    We had a discussion about resuscitation directives. She is not sure about the difference between DNR CC arrest and DNR CC. I explained this to her. She is decided to be a DNR CC only. DNR paper filled out. Review of Systems   Constitutional: Positive for fatigue. Negative for appetite change and unexpected weight change. HENT: Negative for congestion, ear pain, facial swelling, rhinorrhea, sore throat, tinnitus and trouble swallowing. Eyes: Negative for photophobia, pain, discharge, itching and visual disturbance. No vision in left eye and partial vision right   Respiratory: Negative for cough, shortness of breath, wheezing and stridor. Cardiovascular: Negative for chest pain, palpitations and leg swelling. Gastrointestinal: Positive for constipation, diarrhea and nausea. Negative for abdominal pain, anal bleeding, blood in stool and vomiting. Dysphagia   Endocrine: Negative for cold intolerance, heat intolerance, polydipsia, polyphagia and polyuria. Genitourinary: Positive for urgency. Negative for difficulty urinating, dysuria, flank pain, frequency and hematuria. Musculoskeletal: Positive for myalgias and neck pain. Negative for arthralgias, gait problem and joint swelling. Skin: Negative for color change, pallor and rash.         Nodule left lower abdomen Allergic/Immunologic: Negative for environmental allergies and food allergies. Neurological: Positive for weakness and headaches. Negative for dizziness, tremors, seizures, syncope, speech difficulty, light-headedness and numbness. Right-sided weakness  Mild expressive aphasia   Hematological: Negative for adenopathy. Does not bruise/bleed easily. Psychiatric/Behavioral: Negative for agitation, behavioral problems, confusion, sleep disturbance and suicidal ideas. The patient is not nervous/anxious.            Current Outpatient Medications:     polyethylene glycol (GLYCOLAX) 17 g packet, Take 17 g by mouth 2 times daily as needed for Constipation, Disp: 527 g, Rfl: 2    propranolol (INDERAL) 10 MG tablet, Take 1 tablet by mouth 3 times daily, Disp: 90 tablet, Rfl: 3    furosemide (LASIX) 20 MG tablet, Take 1 tablet by mouth daily, Disp: 60 tablet, Rfl: 3    Nitrofurantoin Monohyd Macro (MACROBID PO), Take by mouth, Disp: , Rfl:     CARAFATE 1 g tablet, Take 1 twice daily with meals, Disp: 60 tablet, Rfl: 3    sodium chloride flush 0.9 % injection, Infuse 10 mLs intravenously as needed for Line Care, Disp: 20 mL, Rfl: 3    aspirin 81 MG EC tablet, Take 81 mg by mouth daily, Disp: , Rfl:     atorvastatin (LIPITOR) 10 MG tablet, Take 1 tablet by mouth daily, Disp: 90 tablet, Rfl: 1    rOPINIRole (REQUIP) 0.5 MG tablet, Take 1 tablet by mouth See Admin Instructions 1 tablet - breakfast 2 tablets - supper, Disp: 270 tablet, Rfl: 1    levothyroxine (SYNTHROID) 50 MCG tablet, Take 2.5 tablets by mouth Five times weekly Given Monday,Tuesday,Wednesday,Thursday,Friday, Disp: 90 tablet, Rfl: 1    levothyroxine (SYNTHROID) 50 MCG tablet, Take 2 tablets by mouth Twice a Week Given Saturday,Sunday, Disp: 24 tablet, Rfl: 1    pantoprazole (PROTONIX) 40 MG tablet, Take 1 tablet by mouth every morning (before breakfast), Disp: 90 tablet, Rfl: 1    folic acid (FOLVITE) 1 MG tablet, Take 1 tablet by mouth Daily with lunch, Disp: 90 tablet, Rfl: 1    acetaminophen (TYLENOL) 325 MG tablet, Take 650 mg by mouth every 6 hours as needed for Pain, Disp: , Rfl:     sodium chloride (OCEAN, BABY AYR) 0.65 % nasal spray, 1 spray by Nasal route 4 times daily as needed for Congestion, Disp: , Rfl:     azelastine (ASTELIN) 0.1 % nasal spray, 1 spray by Nasal route 2 times daily, Disp: , Rfl:     diphenhydrAMINE (BENYLIN) 12.5 MG/5ML liquid, Take 25 mg by mouth 4 times daily as needed for Allergies (migrane break through), Disp: , Rfl:     Artificial Saliva (BIOTENE DRY MOUTH MOISTURIZING MT), Take 1 spray by mouth every 3 hours, Disp: , Rfl:     clotrimazole (LOTRIMIN) 1 % cream, Apply topically 2 times daily as needed Under breast and abdominal folds, Disp: , Rfl:     OIL OF OREGANO PO, Take 60 mg by mouth daily (with breakfast), Disp: , Rfl:     Bacillus Coagulans-Inulin (PROBIOTIC FORMULA) 1-250 BILLION-MG CAPS, Take 1 capsule by mouth nightly, Disp: , Rfl:     miconazole (MICOTIN) 2 % powder, Apply topically daily Apply to groin, under breast and skin folds. , Disp: , Rfl:     magnesium hydroxide (MILK OF MAGNESIA) 400 MG/5ML suspension, Take 30 mLs by mouth 4 times daily as needed for Constipation, Disp: , Rfl:     NONFORMULARY, Apply topically daily as needed (joint pain) Natural T Relief Pain Cream, Disp: , Rfl:     SENNA LEAVES PO, Take 470 mg by mouth three times daily , Disp: , Rfl:     promethazine (PHENERGAN) 25 MG tablet, Take 25 mg by mouth every 6 hours as needed for Nausea, Disp: , Rfl:     Magnesium 500 MG CAPS, Take 500 mg by mouth 2 times daily, Disp: , Rfl:     tiZANidine (ZANAFLEX) 2 MG tablet, Take 2 mg by mouth nightly, Disp: , Rfl:     triamcinolone (KENALOG) 0.1 % cream, Apply topically daily as needed (rash), Disp: , Rfl:     tetrahydrozoline 0.05 % ophthalmic solution, Place 2 drops into both eyes 3 times daily, Disp: , Rfl:     NONFORMULARY, Take 2 capsules by mouth 2 times daily (with meals) Turmeric Ginger, Disp: , Rfl:     potassium chloride (KLOR-CON) 20 MEQ packet, Take 20 mEq by mouth daily, Disp: 90 packet, Rfl: 2    levomilnacipran (FETZIMA) 40 MG CP24 extended release capsule, Take 40 mg by mouth daily, Disp: , Rfl:     tiZANidine (ZANAFLEX) 2 MG tablet, Take 1 tablet by mouth 2 times daily (Patient taking differently: Take 2 mg by mouth 2 times daily Taking 0.5 - 1 tab if needed), Disp: 180 tablet, Rfl: 2    Immune Globulin, Human, (GAMMAGARD) 10 GM/100ML SOLN solution, 40 g by Intravenous (Continuous Infusion) route every 30 days , Disp: , Rfl:     Galcanezumab-gnlm (EMGALITY) 120 MG/ML SOAJ, Inject 120 mg into the skin every 30 days , Disp: , Rfl:     clonazePAM (KLONOPIN) 0.5 MG tablet, Take 0.5 mg by mouth 2 times daily. ., Disp: , Rfl:     divalproex (DEPAKOTE) 250 MG DR tablet, Take 250 mg by mouth See Admin Instructions 250 mg -  mg - Afternoon 500 mg - PM, Disp: , Rfl:     econazole nitrate 1 % cream, Apply topically daily as needed (Apply under breasts and folds) , Disp: , Rfl:     fluticasone (FLONASE) 50 MCG/ACT nasal spray, 1 spray by Nasal route 2 times daily , Disp: , Rfl:     fluticasone-salmeterol (ADVAIR HFA) 230-21 MCG/ACT inhaler, Inhale 2 puffs into the lungs 2 times daily, Disp: , Rfl:     insulin lispro (HUMALOG KWIKPEN) 100 UNIT/ML pen, Inject 0-10 Units into the skin 3 times daily (before meals) *Per Sliding Scale*, Disp: , Rfl:     insulin glargine (LANTUS) 100 UNIT/ML injection vial, Inject 0-6 Units into the skin 2 times daily 2 units -AM 6 units - PM, Disp: , Rfl:     ipratropium-albuterol (DUONEB) 0.5-2.5 (3) MG/3ML SOLN nebulizer solution, Inhale 1 vial into the lungs every 4 hours as needed for Shortness of Breath , Disp: , Rfl:     montelukast (SINGULAIR) 10 MG tablet, Take 10 mg by mouth nightly, Disp: , Rfl:     ondansetron (ZOFRAN-ODT) 8 MG TBDP disintegrating tablet, Take 8 mg by mouth every 8 hours as needed for Nausea or Vomiting , Disp: , Rfl:     Heparin & NaCl Lock Flush 10-0.9 UNIT/ML-% KIT, Infuse 2.5 mg intravenously as needed (port flush), Disp: 1 kit, Rfl: 0     Allergies   Allergen Reactions    Diamox [Acetazolamide] Shortness Of Breath and Rash    Abilify [Aripiprazole]     Adhesive Tape     Aspartame And Phenylalanine     Cymbalta [Duloxetine Hcl]     Darvon [Propoxyphene]      darvocet    Dilaudid [Hydromorphone Hcl]     Doxepin     Doxycycline     Effexor [Venlafaxine]     Grapeseed Extract [Nutritional Supplements]      grapes    Hydrochlorothiazide     Iron     Levaquin [Levofloxacin In D5w]     Other      strawberries    Pecan Nut (Diagnostic)     Red Dye     Reglan [Metoclopramide]     Rocephin [Ceftriaxone]     Rosemary Oil     Sulfa Antibiotics     Tetracyclines & Related     Topamax [Topiramate]     Tramadol     Triptans     Tylenol [Acetaminophen]     [place] [Macadamia Nut Oil]     Zithromax [Azithromycin]         Past Medical History:   Diagnosis Date    Anemia     Anxiety     Asthma     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     Connective tissue disease (HCC)     Cyst of right breast     DDD (degenerative disc disease), cervical     Depression     Diabetes mellitus (HCC)     Dysphagia     Fibromyalgia     Gastroparesis     GERD (gastroesophageal reflux disease)     Headache     Hematuria     Hyperlipidemia     Hypertension     Hypothyroidism     IBS (irritable bowel syndrome)     with constipation and diarrhea    Immune deficiency disorder (HCC)     Insomnia     Kidney stones     Meningitis     x4--- twice after IVIG     PAULINE on CPAP     cpap dependence    PCOS (polycystic ovarian syndrome)     POTS (postural orthostatic tachycardia syndrome)     Pseudotumor cerebri     in 2008--- treated with LP; allergy to diamox    Raynaud's disease     Rectal bleeding     RLS (restless legs syndrome)     Scleroderma ([address]     Sjogren's disease [address] [address]     Thyroid nodule     TIA (transient ischemic attack)        Health Maintenance Due   Topic Date Due    Diabetic foot exam  12/18/1987    Diabetic retinal exam  12/18/1987    HIV screen  12/18/1992    Diabetic microalbuminuria test  12/18/1995    Hepatitis B vaccine (1 of 3 - Risk 3-dose series) 12/18/1996    Cervical cancer screen  12/18/1998        Patient Active Problem List   Diagnosis    Vision loss, bilateral    Type 2 diabetes mellitus without complication, with long-term current use of insulin (HCC)    Thyroid nodule    Solitary cyst of right breast    Sjogren's syndrome with keratoconjunctivitis sicca ([facility] [address]    Retention of urine    Restless legs    Raynaud's phenomenon without gangrene    Primary fibromyalgia syndrome    Postural orthostatic tachycardia syndrome    PAULINE on CPAP    Obesity, Class II, BMI 35-39.9    Other and unspecified hyperlipidemia    Other dysphagia    Lung nodule    Long term current use of insulin (HCC)    Insomnia    Intractable chronic migraine without aura and without status migrainosus    Hypothyroidism    Hypogammaglobulinemia (HCC)    History of renal calculi    Gastroparesis    Gastroesophageal reflux disease    Essential hypertension    DDD (degenerative disc disease)    CVID (common variable immunodeficiency) (HCC)    Migraine    Irritable bowel syndrome    Constipation    Diarrhea    Benign intracranial hypertension    Depression    Anxiety    Anemia    Polycystic disease, ovaries    Cervicalgia    Asthma    Connective tissue disorder (HCC)    Stroke-like symptoms    TIA (transient ischemic attack)    Weakness of extremity    Subcutaneous nodule of abdominal wall    Flu vaccine need        Past Surgical History:   Procedure Laterality Date    APPENDECTOMY      CARPAL TUNNEL RELEASE Bilateral     CHOLECYSTECTOMY      HYSTERECTOMY      PORT SURGERY      x3    PYLOROPLASTY          Family History   Problem Relation Age of Onset    Alzheimer's Disease Mother     Alzheimer's Disease Father     Stroke Sister     Osteoporosis Sister     Hypertension Brother     Alzheimer's Disease Paternal Aunt     Alzheimer's Disease Paternal Uncle         Social History     Tobacco Use    Smoking status: Never Smoker    Smokeless tobacco: Never Used   Substance Use Topics    Alcohol use: No    Drug use: No        Objective  Vitals:    10/05/20 1138   BP: 128/76   Site: Right Upper Arm   Position: Sitting   Cuff Size: Medium Adult   Pulse: 87   Temp: 97.8 °F (36.6 °C)   TempSrc: Temporal   SpO2: 98%   Weight: 192 lb (87.1 kg)   Height: 5' 2\" (1.575 m)        Exam:  Physical Exam  Constitutional:       General: She is not in acute distress. Appearance: She is well-developed. She is obese. She is not ill-appearing. Comments: Patient is blind   HENT:      Head: Normocephalic. Right Ear: External ear normal.      Left Ear: External ear normal.   Eyes:      General: No scleral icterus. Right eye: No discharge. Left eye: No discharge. Conjunctiva/sclera: Conjunctivae normal.      Pupils: Pupils are equal, round, and reactive to light. Neck:      Musculoskeletal: Normal range of motion and neck supple. Thyroid: No thyromegaly. Cardiovascular:      Rate and Rhythm: Normal rate and regular rhythm. Heart sounds: Normal heart sounds. No murmur. No friction rub. No gallop. Pulmonary:      Effort: Pulmonary effort is normal. No respiratory distress. Breath sounds: Normal breath sounds. No wheezing or rales. Chest:      Chest wall: No tenderness. Abdominal:      General: Bowel sounds are normal. There is no distension. Palpations: Abdomen is soft. There is no mass. Tenderness: There is no abdominal tenderness. There is no guarding or rebound. Comments: She has an approximately 1 cm hard easily movable nodule on her left lower anterior abdominal wall. It is subcutaneous in location. Musculoskeletal: Normal range of motion. General: No tenderness or deformity. Lymphadenopathy:      Cervical: No cervical adenopathy. Skin:     General: Skin is warm and dry. Coloration: Skin is not pale. Findings: No erythema or rash. Neurological:      Mental Status: She is alert and oriented to person, place, and time. Cranial Nerves: No cranial nerve deficit. Sensory: No sensory deficit. Deep Tendon Reflexes: Reflexes normal.   Psychiatric:         Mood and Affect: Mood normal.         Behavior: Behavior normal.         Thought Content: Thought content normal.         Judgment: Judgment normal.          Last labs reviewed. ASSESSMENT & PLAN :   Problem List        Circulatory    Essential hypertension    Relevant Medications    propranolol (INDERAL) 10 MG tablet    furosemide (LASIX) 20 MG tablet       Other    Constipation     Follow-up with GI         Relevant Medications    polyethylene glycol (GLYCOLAX) 17 g packet    Subcutaneous nodule of abdominal wall - Primary     She is going to think about which surgeon she would like and let me know I will make make the referral for evaluation and possible excision. In the meantime we will get an ultrasound of her abdominal wall and see if the nodule is solid or cystic         Relevant Orders    US ABDOMEN LIMITED    Flu vaccine need     Low-dose flu vaccine given         Relevant Orders    INFLUENZA, QUADV, 3 YRS AND OLDER, IM PF, PREFILL SYR OR SDV, 0.5ML (AFLURIA QUADV, PF) (Completed)           Return in about 2 months (around 12/5/2020), or diabetes and constipation.        Sharon Citizen, DO  10/5/2020

## 2020-10-06 ENCOUNTER — CARE COORDINATION (OUTPATIENT)
Dept: CARE COORDINATION | Age: 45
End: 2020-10-06

## 2020-10-06 NOTE — CARE COORDINATION
Comment alert noted in [facility] remote symptom monitoring program.     Abby Vee, 8:28 AM  Just had my flu shot yesterday afternoon. Kareen Vazquez LPN, 8:30 AM  Good Morning [place], Thanks for letting us know!

## 2020-10-07 ENCOUNTER — CARE COORDINATION (OUTPATIENT)
Dept: CARE COORDINATION | Age: 45
End: 2020-10-07

## 2020-10-08 ENCOUNTER — TELEPHONE (OUTPATIENT)
Dept: SURGERY | Age: 45
End: 2020-10-08

## 2020-10-08 ENCOUNTER — HOSPITAL ENCOUNTER (EMERGENCY)
Age: 45
Discharge: HOME | End: 2020-10-08
Attending: EMERGENCY MEDICINE
Payer: MEDICAID

## 2020-10-08 ENCOUNTER — APPOINTMENT (OUTPATIENT)
Dept: CT IMAGING | Age: 45
End: 2020-10-08
Payer: MEDICAID

## 2020-10-08 ENCOUNTER — APPOINTMENT (OUTPATIENT)
Dept: GENERAL RADIOLOGY | Age: 45
End: 2020-10-08
Payer: MEDICAID

## 2020-10-08 VITALS
SYSTOLIC BLOOD PRESSURE: 138 MMHG | HEART RATE: 84 BPM | WEIGHT: 190 LBS | DIASTOLIC BLOOD PRESSURE: 72 MMHG | TEMPERATURE: 97.2 F | OXYGEN SATURATION: 98 % | HEIGHT: 62 IN | RESPIRATION RATE: 14 BRPM | BODY MASS INDEX: 34.96 KG/M2

## 2020-10-08 LAB
ALBUMIN SERPL-MCNC: 3.9 G/DL (ref 3.5–5.2)
ALP BLD-CCNC: 57 U/L (ref 35–104)
ALT SERPL-CCNC: 19 U/L (ref 0–32)
ANION GAP SERPL CALCULATED.3IONS-SCNC: 7 MMOL/L (ref 7–16)
AST SERPL-CCNC: 19 U/L (ref 0–31)
BASOPHILS ABSOLUTE: 0.03 E9/L (ref 0–0.2)
BASOPHILS RELATIVE PERCENT: 0.5 % (ref 0–2)
BILIRUB SERPL-MCNC: 0.3 MG/DL (ref 0–1.2)
BILIRUBIN URINE: NEGATIVE
BLOOD, URINE: NEGATIVE
BUN BLDV-MCNC: 14 MG/DL (ref 6–20)
CALCIUM SERPL-MCNC: 9.1 MG/DL (ref 8.6–10.2)
CHLORIDE BLD-SCNC: 104 MMOL/L (ref 98–107)
CLARITY: CLEAR
CO2: 25 MMOL/L (ref 22–29)
COLOR: YELLOW
CREAT SERPL-MCNC: 0.7 MG/DL (ref 0.5–1)
EOSINOPHILS ABSOLUTE: 0.07 E9/L (ref 0.05–0.5)
EOSINOPHILS RELATIVE PERCENT: 1.1 % (ref 0–6)
GFR AFRICAN AMERICAN: >60
GFR NON-AFRICAN AMERICAN: >60 ML/MIN/1.73
GLUCOSE BLD-MCNC: 204 MG/DL (ref 74–99)
GLUCOSE URINE: NEGATIVE MG/DL
HCT VFR BLD CALC: 38.1 % (ref 34–48)
HEMOGLOBIN: 12.2 G/DL (ref 11.5–15.5)
IMMATURE GRANULOCYTES #: 0.02 E9/L
IMMATURE GRANULOCYTES %: 0.3 % (ref 0–5)
KETONES, URINE: NEGATIVE MG/DL
LACTIC ACID, SEPSIS: 0.7 MMOL/L (ref 0.5–1.9)
LEUKOCYTE ESTERASE, URINE: NEGATIVE
LIPASE: 42 U/L (ref 13–60)
LYMPHOCYTES ABSOLUTE: 2.24 E9/L (ref 1.5–4)
LYMPHOCYTES RELATIVE PERCENT: 35.1 % (ref 20–42)
MCH RBC QN AUTO: 30 PG (ref 26–35)
MCHC RBC AUTO-ENTMCNC: 32 % (ref 32–34.5)
MCV RBC AUTO: 93.6 FL (ref 80–99.9)
MONOCYTES ABSOLUTE: 0.42 E9/L (ref 0.1–0.95)
MONOCYTES RELATIVE PERCENT: 6.6 % (ref 2–12)
NEUTROPHILS ABSOLUTE: 3.61 E9/L (ref 1.8–7.3)
NEUTROPHILS RELATIVE PERCENT: 56.4 % (ref 43–80)
NITRITE, URINE: NEGATIVE
PDW BLD-RTO: 13.4 FL (ref 11.5–15)
PH UA: 7.5 (ref 5–9)
PLATELET # BLD: 203 E9/L (ref 130–450)
PMV BLD AUTO: 10.7 FL (ref 7–12)
POTASSIUM SERPL-SCNC: 4.2 MMOL/L (ref 3.5–5)
PRO-BNP: 19 PG/ML (ref 0–125)
PROTEIN UA: NEGATIVE MG/DL
RBC # BLD: 4.07 E12/L (ref 3.5–5.5)
SODIUM BLD-SCNC: 136 MMOL/L (ref 132–146)
SPECIFIC GRAVITY UA: 1.01 (ref 1–1.03)
TOTAL PROTEIN: 6.1 G/DL (ref 6.4–8.3)
TROPONIN: <0.01 NG/ML (ref 0–0.03)
UROBILINOGEN, URINE: 0.2 E.U./DL
WBC # BLD: 6.4 E9/L (ref 4.5–11.5)

## 2020-10-08 PROCEDURE — 84484 ASSAY OF TROPONIN QUANT: CPT

## 2020-10-08 PROCEDURE — 83605 ASSAY OF LACTIC ACID: CPT

## 2020-10-08 PROCEDURE — 96375 TX/PRO/DX INJ NEW DRUG ADDON: CPT

## 2020-10-08 PROCEDURE — 80053 COMPREHEN METABOLIC PANEL: CPT

## 2020-10-08 PROCEDURE — 6360000004 HC RX CONTRAST MEDICATION: Performed by: RADIOLOGY

## 2020-10-08 PROCEDURE — 74177 CT ABD & PELVIS W/CONTRAST: CPT

## 2020-10-08 PROCEDURE — 83690 ASSAY OF LIPASE: CPT

## 2020-10-08 PROCEDURE — 99282 EMERGENCY DEPT VISIT SF MDM: CPT

## 2020-10-08 PROCEDURE — 71045 X-RAY EXAM CHEST 1 VIEW: CPT

## 2020-10-08 PROCEDURE — 96374 THER/PROPH/DIAG INJ IV PUSH: CPT

## 2020-10-08 PROCEDURE — 87040 BLOOD CULTURE FOR BACTERIA: CPT

## 2020-10-08 PROCEDURE — 81003 URINALYSIS AUTO W/O SCOPE: CPT

## 2020-10-08 PROCEDURE — C9113 INJ PANTOPRAZOLE SODIUM, VIA: HCPCS | Performed by: EMERGENCY MEDICINE

## 2020-10-08 PROCEDURE — 83880 ASSAY OF NATRIURETIC PEPTIDE: CPT

## 2020-10-08 PROCEDURE — 85025 COMPLETE CBC W/AUTO DIFF WBC: CPT

## 2020-10-08 PROCEDURE — 99283 EMERGENCY DEPT VISIT LOW MDM: CPT

## 2020-10-08 PROCEDURE — 6360000002 HC RX W HCPCS: Performed by: EMERGENCY MEDICINE

## 2020-10-08 PROCEDURE — 93005 ELECTROCARDIOGRAM TRACING: CPT | Performed by: EMERGENCY MEDICINE

## 2020-10-08 RX ORDER — ONDANSETRON 2 MG/ML
4 INJECTION INTRAMUSCULAR; INTRAVENOUS ONCE
Status: COMPLETED | OUTPATIENT
Start: 2020-10-08 | End: 2020-10-08

## 2020-10-08 RX ORDER — PANTOPRAZOLE SODIUM 40 MG/10ML
40 INJECTION, POWDER, LYOPHILIZED, FOR SOLUTION INTRAVENOUS ONCE
Status: COMPLETED | OUTPATIENT
Start: 2020-10-08 | End: 2020-10-08

## 2020-10-08 RX ORDER — HEPARIN SODIUM (PORCINE) LOCK FLUSH IV SOLN 100 UNIT/ML 100 UNIT/ML
SOLUTION INTRAVENOUS
Status: DISCONTINUED
Start: 2020-10-08 | End: 2020-10-09 | Stop reason: HOSPADM

## 2020-10-08 RX ORDER — KETOROLAC TROMETHAMINE 30 MG/ML
15 INJECTION, SOLUTION INTRAMUSCULAR; INTRAVENOUS ONCE
Status: COMPLETED | OUTPATIENT
Start: 2020-10-08 | End: 2020-10-08

## 2020-10-08 RX ORDER — DICYCLOMINE HCL 20 MG
20 TABLET ORAL 4 TIMES DAILY
Qty: 30 TABLET | Refills: 0 | Status: SHIPPED | OUTPATIENT
Start: 2020-10-08 | End: 2020-11-12

## 2020-10-08 RX ADMIN — KETOROLAC TROMETHAMINE 15 MG: 30 INJECTION, SOLUTION INTRAMUSCULAR; INTRAVENOUS at 19:32

## 2020-10-08 RX ADMIN — ONDANSETRON 4 MG: 2 INJECTION INTRAMUSCULAR; INTRAVENOUS at 19:33

## 2020-10-08 RX ADMIN — IOPAMIDOL 75 ML: 755 INJECTION, SOLUTION INTRAVENOUS at 20:13

## 2020-10-08 RX ADMIN — PANTOPRAZOLE SODIUM 40 MG: 40 INJECTION, POWDER, FOR SOLUTION INTRAVENOUS at 19:33

## 2020-10-08 ASSESSMENT — PAIN SCALES - GENERAL: PAINLEVEL_OUTOF10: 7

## 2020-10-08 NOTE — ED NOTES
Radiology Procedure Waiver   Name: Luci Morales  : 1975  MRN: [id number]    Date:  10/8/20    Time: 7:03 PM EDT    Benefits of immediately proceeding with Radiology exam(s) without pre-testing outweigh the risks or are not indicated as specified below and therefore the following is/are being waived:    [x] Pregnancy test   [] Patients LMP on-time and regular.   [] Patient had Tubal Ligation or has other Contraception Device. [] Patient  is Menopausal or Premenarcheal.    [x] Patient had Full or Partial Hysterectomy. [] Protocol for Iodine allergy    [] MRI Questionnaire     [] BUN/Creatinine   [] Patient age w/no hx of renal dysfunction. [] Patient on Dialysis. [] Recent Normal Labs.   Electronically signed by Malia Lyman MD on 10/8/20 at 7:03 PM EDT               Frederick Gallegos MD  10/08/20 [id number]

## 2020-10-08 NOTE — TELEPHONE ENCOUNTER
Patient was referred by Dr. Hayley Ridley for mass on abdomen. Patient was scheduled on 10/30/20 in [place] office @ 8:30  am with Dr. Sal Sands. Patient was instructed to bring a photo ID, insurance card (if applicable), and list of any current medications. Patient verbalized understanding of appointment instructions.           Electronically signed by Hair Rios on 10/8/20 at 11:13 AM EDT

## 2020-10-08 NOTE — ED PROVIDER NOTES
HPI:  10/8/20,   Time: 6:06 PM EDT       Misha Del Castillo is a 45 y.o. female presenting to the ED for abdominal pain, beginning 1 day ago. The complaint has been persistent, mild in severity, and worsened by nothing. Patient is a 79-year-old female with history of appendectomy cholecystectomy and hysterectomy. She says she is never had a small bowel obstruction but family members have. She states that she feels somewhat bloated and this is all been going on since yesterday. She has had waxing waxing and waning chills and she thinks possibly a fever at home. She states this happens quite frequently with her she has a history of common variable immunodeficiency. She does not have a fever at this time. She had nausea but no vomiting. She states that she did have a small bowel one earlier today. She denies any chest pain. No pleuritic pain. No cough. No shortness of breath at this time. (Triage note had mentioned shortness of breath but when I asked her she denied. She states that was more of just a\" bloating discomfort feeling and not actually shortness of breath\")She has no headache no urinary complaints. No dysuria urgency or frequency. States the pain at this time is a 4 out of 10. She reports her pain is mostly diffuse abdominal pain sometimes more in the right upper quadrant and left lower quadrant, but \"throughout her abdomen\". Does note that she received a flu shot 3 days ago and sometimes that causes her to have some feelings of chills afterwards.     Review of Systems:   Pertinent positives and negatives are stated within HPI, all other systems reviewed and are negative.          --------------------------------------------- PAST HISTORY ---------------------------------------------  Past Medical History:  has a past medical history of Anemia, Anxiety, Asthma, Blind, Connective tissue disease ([facility] [address] Cyst of right breast, DDD (degenerative disc disease), cervical, Depression, Diabetes mellitus CBC Auto Differential   Result Value Ref Range    WBC 6.4 4.5 - 11.5 E9/L    RBC 4.07 3.50 - 5.50 E12/L    Hemoglobin 12.2 11.5 - 15.5 g/dL    Hematocrit 38.1 34.0 - 48.0 %    MCV 93.6 80.0 - 99.9 fL    MCH 30.0 26.0 - 35.0 pg    MCHC 32.0 32.0 - 34.5 %    RDW 13.4 11.5 - 15.0 fL    Platelets 228 449 - 403 E9/L    MPV 10.7 7.0 - 12.0 fL    Neutrophils % 56.4 43.0 - 80.0 %    Immature Granulocytes % 0.3 0.0 - 5.0 %    Lymphocytes % 35.1 20.0 - 42.0 %    Monocytes % 6.6 2.0 - 12.0 %    Eosinophils % 1.1 0.0 - 6.0 %    Basophils % 0.5 0.0 - 2.0 %    Neutrophils Absolute 3.61 1.80 - 7.30 E9/L    Immature Granulocytes # 0.02 E9/L    Lymphocytes Absolute 2.24 1.50 - 4.00 E9/L    Monocytes Absolute 0.42 0.10 - 0.95 E9/L    Eosinophils Absolute 0.07 0.05 - 0.50 E9/L    Basophils Absolute 0.03 0.00 - 0.20 E9/L   Lipase   Result Value Ref Range    Lipase 42 13 - 60 U/L   Troponin   Result Value Ref Range    Troponin <0.01 0.00 - 0.03 ng/mL   Urinalysis   Result Value Ref Range    Color, UA Yellow Straw/Yellow    Clarity, UA Clear Clear    Glucose, Ur Negative Negative mg/dL    Bilirubin Urine Negative Negative    Ketones, Urine Negative Negative mg/dL    Specific Gravity, UA 1.015 1.005 - 1.030    Blood, Urine Negative Negative    pH, UA 7.5 5.0 - 9.0    Protein, UA Negative Negative mg/dL    Urobilinogen, Urine 0.2 <2.0 E.U./dL    Nitrite, Urine Negative Negative    Leukocyte Esterase, Urine Negative Negative   Lactate, Sepsis   Result Value Ref Range    Lactic Acid, Sepsis 0.7 0.5 - 1.9 mmol/L   Brain Natriuretic Peptide   Result Value Ref Range    Pro-BNP 19 0 - 125 pg/mL   EKG 12 Lead   Result Value Ref Range    Ventricular Rate 90 BPM    Atrial Rate 90 BPM    P-R Interval 146 ms    QRS Duration 78 ms    Q-T Interval 378 ms    QTc Calculation (Bazett) 462 ms    P Axis 49 degrees    R Axis 34 degrees    T Axis 31 degrees       RADIOLOGY:  Interpreted by Radiologist.  CT ABDOMEN PELVIS W IV CONTRAST Additional Contrast? and states this frequently occurs with her. Without a known source. Therefore, we will do a CT the abdomen pelvis to look for any bowel obstruction ileus or constipation. We will check lab work urine we will do a chest x-ray for some waxing waning shortness of breath. She has no chest pain no pleuritic pain no recent cough. Differential diagnosis is constipation, ileus, small bowel obstruction, GERD, peptic ulcer disease, ACS, MI, dysrhythmia, CHF, asthma exacerbation. This patient has remained hemodynamically stable during their ED course. Had an EKG here showed normal sinus rhythm at 90 beats minute no signs of any ST changes. .  She is already had a hysterectomy so pregnancy test was not ordered. Being TPA and lactic acid were also normal.  CBC was normal chemistry was normal LFTs were normal lipase and troponin were both normal urinalysis was negative for acute infection. Patient is CT abdomen and pelvis that shows no inflammatory obstructive process. She has a known right sided ovarian cyst that she is aware of that she has no pain to that area. Repeat abdominal exam is soft and nontender. She is able tolerate p.o. fluids. She is comfortable the plan for discharge home. She will return to the ER for any new worsening symptoms. Re-Evaluations:             Re-evaluation. Patients symptoms are improving. Patient's pain is resolved after treatment here in the department. She is comfortable to be discharged home. She request Bentyl which she is used in the past to help with her symptomatology and she is going to follow-up outpatient with her GI doctor. Re-examination  10/8/20   6:06 PM EDT          Vital Signs:   Vitals:    10/08/20 1612 10/08/20 2152 10/08/20 2310   BP: (!) 141/69  138/72   Pulse: 108 84 84   Resp: 18  14   Temp: 97.2 °F (36.2 °C)     TempSrc: Skin     SpO2: 98%  98%   Weight: 190 lb (86.2 kg)     Height: 5' 2\" (1.575 m)                   Counseling:    The emergency provider has spoken with the patient and discussed todays results, in addition to providing specific details for the plan of care and counseling regarding the diagnosis and prognosis. Questions are answered at this time and they are agreeable with the plan.       --------------------------------- IMPRESSION AND DISPOSITION ---------------------------------    IMPRESSION  1. Generalized abdominal pain Improving       DISPOSITION  Disposition: Discharge to home  Patient condition is stable    NOTE: This report was transcribed using voice recognition software.  Every effort was made to ensure accuracy; however, inadvertent computerized transcription errors may be present        Karla Titus MD  10/09/20 0847

## 2020-10-09 ENCOUNTER — CARE COORDINATION (OUTPATIENT)
Dept: CARE COORDINATION | Age: 45
End: 2020-10-09

## 2020-10-09 LAB
EKG ATRIAL RATE: 90 BPM
EKG P AXIS: 49 DEGREES
EKG P-R INTERVAL: 146 MS
EKG Q-T INTERVAL: 378 MS
EKG QRS DURATION: 78 MS
EKG QTC CALCULATION (BAZETT): 462 MS
EKG R AXIS: 34 DEGREES
EKG T AXIS: 31 DEGREES
EKG VENTRICULAR RATE: 90 BPM

## 2020-10-09 NOTE — CARE COORDINATION
-ACM attempted to reach patient to follow up on recent ED visit on 10-8-2020 for post ED COVID-19 Monitoring, however no answer. -HIPAA compliant VM left with ACM's contact information, requesting call back.
reviewed discharge instructions, medical action plan and red flags related to discharge diagnosis. Reviewed and educated them on any new and changed medications related to discharge diagnosis. Advised obtaining a 90-day supply of all daily and as-needed medications. Education provided regarding infection prevention, and signs and symptoms of COVID-19 and when to seek medical attention with patient who verbalized understanding. Discussed exposure protocols and quarantine from [address] you at higher risk for severe illness 2019 and given an opportunity for questions and concerns. The patient agrees to contact the COVID-19 hotline [phone] or PCP office for questions related to their healthcare. CTN/ACM provided contact information for future reference. From CDC: Are you at higher risk for severe illness?  Wash your hands often.  Avoid close contact (6 feet, which is about two arm lengths) with people who are sick.  Put distance between yourself and other people if COVID-19 is spreading in your community.  Clean and disinfect frequently touched surfaces.  Avoid all cruise travel and non-essential air travel.  Call your healthcare professional if you have concerns about COVID-19 and your underlying condition or if you are sick. For more information on steps you can take to protect yourself, see CDC's How to Protect Yourself    Pt will be further monitored by COVID Loop Team based on severity of symptoms and risk factors.

## 2020-10-10 ENCOUNTER — CARE COORDINATION (OUTPATIENT)
Dept: CARE COORDINATION | Age: 45
End: 2020-10-10

## 2020-10-10 NOTE — CARE COORDINATION
Received a question in loop from patient:  Himanshu Rogers, 6:41 AM   When CT report came through what is a decompressed colon. And in right ovary it states I have 2 cysts in the ovary. A 3.3 x 1.5 cm cyst in the right ovary. And a peripherally enhancing 2.3 cm cyst within the right ovary, as well, likely hemorrhagic cyst. Is this normal with belly pain, fevers, etc. I dont have a OB/GYN at the moment. Please advise. Thank you. Robert Quispe RN, 8:27 AM   Good morning Bela Alba, As nurses we would recommend scheduling an appointment with your primary physician to discuss your results. We can not interpret test results without a physician. Your PCP can give you a referral to OB/GYN or gastroenterologist if they believe it is needed. Sorry, I am not much help. If you do not have a primary you can search [employer] 24/7 for one.  Margo Leslie RN

## 2020-10-13 ENCOUNTER — APPOINTMENT (OUTPATIENT)
Dept: CT IMAGING | Age: 45
End: 2020-10-13
Payer: MEDICAID

## 2020-10-13 ENCOUNTER — HOSPITAL ENCOUNTER (EMERGENCY)
Age: 45
Discharge: HOME | End: 2020-10-13
Attending: EMERGENCY MEDICINE
Payer: MEDICAID

## 2020-10-13 VITALS
WEIGHT: 190 LBS | SYSTOLIC BLOOD PRESSURE: 104 MMHG | TEMPERATURE: 98.2 F | OXYGEN SATURATION: 97 % | BODY MASS INDEX: 34.96 KG/M2 | HEART RATE: 80 BPM | DIASTOLIC BLOOD PRESSURE: 63 MMHG | RESPIRATION RATE: 14 BRPM | HEIGHT: 62 IN

## 2020-10-13 LAB
ALBUMIN SERPL-MCNC: 3.7 G/DL (ref 3.5–5.2)
ALP BLD-CCNC: 53 U/L (ref 35–104)
ALT SERPL-CCNC: 21 U/L (ref 0–32)
ANION GAP SERPL CALCULATED.3IONS-SCNC: 6 MMOL/L (ref 7–16)
AST SERPL-CCNC: 28 U/L (ref 0–31)
BASOPHILS ABSOLUTE: 0.02 E9/L (ref 0–0.2)
BASOPHILS RELATIVE PERCENT: 0.3 % (ref 0–2)
BILIRUB SERPL-MCNC: 0.3 MG/DL (ref 0–1.2)
BILIRUBIN URINE: NEGATIVE
BLOOD CULTURE, ROUTINE: NORMAL
BLOOD, URINE: NEGATIVE
BUN BLDV-MCNC: 14 MG/DL (ref 6–20)
CALCIUM SERPL-MCNC: 9.4 MG/DL (ref 8.6–10.2)
CHLORIDE BLD-SCNC: 103 MMOL/L (ref 98–107)
CLARITY: CLEAR
CO2: 23 MMOL/L (ref 22–29)
COLOR: YELLOW
CREAT SERPL-MCNC: 0.9 MG/DL (ref 0.5–1)
EOSINOPHILS ABSOLUTE: 0.07 E9/L (ref 0.05–0.5)
EOSINOPHILS RELATIVE PERCENT: 1.2 % (ref 0–6)
GFR AFRICAN AMERICAN: >60
GFR NON-AFRICAN AMERICAN: >60 ML/MIN/1.73
GLUCOSE BLD-MCNC: 204 MG/DL (ref 74–99)
GLUCOSE URINE: NEGATIVE MG/DL
HCT VFR BLD CALC: 37.8 % (ref 34–48)
HEMOGLOBIN: 12.5 G/DL (ref 11.5–15.5)
IMMATURE GRANULOCYTES #: 0.02 E9/L
IMMATURE GRANULOCYTES %: 0.3 % (ref 0–5)
KETONES, URINE: NEGATIVE MG/DL
LACTIC ACID: 0.8 MMOL/L (ref 0.5–2.2)
LEUKOCYTE ESTERASE, URINE: NEGATIVE
LYMPHOCYTES ABSOLUTE: 2.2 E9/L (ref 1.5–4)
LYMPHOCYTES RELATIVE PERCENT: 37.1 % (ref 20–42)
MCH RBC QN AUTO: 30.6 PG (ref 26–35)
MCHC RBC AUTO-ENTMCNC: 33.1 % (ref 32–34.5)
MCV RBC AUTO: 92.4 FL (ref 80–99.9)
MONOCYTES ABSOLUTE: 0.39 E9/L (ref 0.1–0.95)
MONOCYTES RELATIVE PERCENT: 6.6 % (ref 2–12)
NEUTROPHILS ABSOLUTE: 3.23 E9/L (ref 1.8–7.3)
NEUTROPHILS RELATIVE PERCENT: 54.5 % (ref 43–80)
NITRITE, URINE: NEGATIVE
PDW BLD-RTO: 13.3 FL (ref 11.5–15)
PH UA: 8.5 (ref 5–9)
PLATELET # BLD: 200 E9/L (ref 130–450)
PMV BLD AUTO: 11 FL (ref 7–12)
POTASSIUM REFLEX MAGNESIUM: 4.6 MMOL/L (ref 3.5–5)
PROTEIN UA: NEGATIVE MG/DL
RBC # BLD: 4.09 E12/L (ref 3.5–5.5)
SODIUM BLD-SCNC: 132 MMOL/L (ref 132–146)
SPECIFIC GRAVITY UA: 1.01 (ref 1–1.03)
TOTAL PROTEIN: 6.1 G/DL (ref 6.4–8.3)
UROBILINOGEN, URINE: 0.2 E.U./DL
WBC # BLD: 5.9 E9/L (ref 4.5–11.5)

## 2020-10-13 PROCEDURE — 87088 URINE BACTERIA CULTURE: CPT

## 2020-10-13 PROCEDURE — 96374 THER/PROPH/DIAG INJ IV PUSH: CPT

## 2020-10-13 PROCEDURE — 2580000003 HC RX 258: Performed by: EMERGENCY MEDICINE

## 2020-10-13 PROCEDURE — 74177 CT ABD & PELVIS W/CONTRAST: CPT

## 2020-10-13 PROCEDURE — 81003 URINALYSIS AUTO W/O SCOPE: CPT

## 2020-10-13 PROCEDURE — 83605 ASSAY OF LACTIC ACID: CPT

## 2020-10-13 PROCEDURE — 80053 COMPREHEN METABOLIC PANEL: CPT

## 2020-10-13 PROCEDURE — 99283 EMERGENCY DEPT VISIT LOW MDM: CPT

## 2020-10-13 PROCEDURE — 6360000002 HC RX W HCPCS: Performed by: EMERGENCY MEDICINE

## 2020-10-13 PROCEDURE — 2580000003 HC RX 258: Performed by: RADIOLOGY

## 2020-10-13 PROCEDURE — 99284 EMERGENCY DEPT VISIT MOD MDM: CPT

## 2020-10-13 PROCEDURE — 96375 TX/PRO/DX INJ NEW DRUG ADDON: CPT

## 2020-10-13 PROCEDURE — 85025 COMPLETE CBC W/AUTO DIFF WBC: CPT

## 2020-10-13 PROCEDURE — 6360000004 HC RX CONTRAST MEDICATION: Performed by: RADIOLOGY

## 2020-10-13 RX ORDER — ONDANSETRON 2 MG/ML
4 INJECTION INTRAMUSCULAR; INTRAVENOUS ONCE
Status: COMPLETED | OUTPATIENT
Start: 2020-10-13 | End: 2020-10-13

## 2020-10-13 RX ORDER — 0.9 % SODIUM CHLORIDE 0.9 %
1000 INTRAVENOUS SOLUTION INTRAVENOUS ONCE
Status: COMPLETED | OUTPATIENT
Start: 2020-10-13 | End: 2020-10-13

## 2020-10-13 RX ORDER — MORPHINE SULFATE 4 MG/ML
4 INJECTION, SOLUTION INTRAMUSCULAR; INTRAVENOUS ONCE
Status: COMPLETED | OUTPATIENT
Start: 2020-10-13 | End: 2020-10-13

## 2020-10-13 RX ORDER — KETOROLAC TROMETHAMINE 30 MG/ML
15 INJECTION, SOLUTION INTRAMUSCULAR; INTRAVENOUS ONCE
Status: COMPLETED | OUTPATIENT
Start: 2020-10-13 | End: 2020-10-13

## 2020-10-13 RX ORDER — PLECANATIDE 3 MG/1
TABLET ORAL DAILY
COMMUNITY
End: 2020-10-15

## 2020-10-13 RX ORDER — SODIUM CHLORIDE 0.9 % (FLUSH) 0.9 %
10 SYRINGE (ML) INJECTION 2 TIMES DAILY
Status: DISCONTINUED | OUTPATIENT
Start: 2020-10-13 | End: 2020-10-13 | Stop reason: HOSPADM

## 2020-10-13 RX ORDER — KETOROLAC TROMETHAMINE 10 MG/1
10 TABLET, FILM COATED ORAL EVERY 8 HOURS PRN
Qty: 15 TABLET | Refills: 0 | Status: SHIPPED | OUTPATIENT
Start: 2020-10-13 | End: 2020-10-19

## 2020-10-13 RX ORDER — ONDANSETRON 4 MG/1
4 TABLET, FILM COATED ORAL EVERY 8 HOURS PRN
Qty: 12 TABLET | Refills: 0 | Status: SHIPPED | OUTPATIENT
Start: 2020-10-13 | End: 2020-10-15

## 2020-10-13 RX ADMIN — MORPHINE SULFATE 4 MG: 4 INJECTION, SOLUTION INTRAMUSCULAR; INTRAVENOUS at 02:10

## 2020-10-13 RX ADMIN — ONDANSETRON 4 MG: 2 INJECTION INTRAMUSCULAR; INTRAVENOUS at 02:10

## 2020-10-13 RX ADMIN — IOPAMIDOL 75 ML: 755 INJECTION, SOLUTION INTRAVENOUS at 02:24

## 2020-10-13 RX ADMIN — SODIUM CHLORIDE 1000 ML: 9 INJECTION, SOLUTION INTRAVENOUS at 02:11

## 2020-10-13 RX ADMIN — Medication 10 ML: at 02:21

## 2020-10-13 RX ADMIN — KETOROLAC TROMETHAMINE 15 MG: 30 INJECTION, SOLUTION INTRAMUSCULAR; INTRAVENOUS at 02:10

## 2020-10-13 RX ADMIN — SODIUM CHLORIDE 1000 ML: 9 INJECTION, SOLUTION INTRAVENOUS at 02:57

## 2020-10-13 ASSESSMENT — PAIN SCALES - GENERAL
PAINLEVEL_OUTOF10: 4
PAINLEVEL_OUTOF10: 9

## 2020-10-13 ASSESSMENT — PAIN DESCRIPTION - LOCATION
LOCATION: FLANK;ABDOMEN;GROIN
LOCATION: ABDOMEN;BACK;GROIN

## 2020-10-13 ASSESSMENT — PAIN DESCRIPTION - ONSET
ONSET: GRADUAL
ONSET: AWAKENED FROM SLEEP

## 2020-10-13 ASSESSMENT — PAIN DESCRIPTION - PAIN TYPE: TYPE: ACUTE PAIN

## 2020-10-13 ASSESSMENT — PAIN DESCRIPTION - PROGRESSION: CLINICAL_PROGRESSION: RAPIDLY WORSENING

## 2020-10-13 ASSESSMENT — PAIN DESCRIPTION - FREQUENCY: FREQUENCY: CONTINUOUS

## 2020-10-13 ASSESSMENT — PAIN - FUNCTIONAL ASSESSMENT: PAIN_FUNCTIONAL_ASSESSMENT: ACTIVITIES ARE NOT PREVENTED

## 2020-10-13 ASSESSMENT — PAIN DESCRIPTION - DESCRIPTORS: DESCRIPTORS: SHARP

## 2020-10-13 ASSESSMENT — PAIN DESCRIPTION - ORIENTATION
ORIENTATION: RIGHT;LOWER
ORIENTATION: RIGHT

## 2020-10-13 NOTE — ED PROVIDER NOTES
HPI:  10/13/20,   Time: 1:37 AM EDT         Anjana House is a 45 y.o. female presenting to the ED for right lower quadrant adnexal pain, beginning 2 hours ago. The complaint has been constant, moderate in severity, and worsened by palpation acute onset of pain with radiation down into her groin and thigh and history of ovarian cysts and kidney stones. Positive nausea without emesis    ROS:   Pertinent positives and negatives are stated within HPI, all other systems reviewed and are negative.  --------------------------------------------- PAST HISTORY ---------------------------------------------  Past Medical History:  has a past medical history of Anemia, Anxiety, Asthma, Blind, Connective tissue disease ([address] Cyst of right breast, DDD (degenerative disc disease), cervical, Depression, Diabetes mellitus ([address] Dysphagia, Fibromyalgia, Gastroparesis, GERD (gastroesophageal reflux disease), Headache, Hematuria, Hyperlipidemia, Hypertension, Hypothyroidism, IBS (irritable bowel syndrome), Immune deficiency disorder ([address] Insomnia, Kidney stones, Meningitis, PAULINE on CPAP, PCOS (polycystic ovarian syndrome), POTS (postural orthostatic tachycardia syndrome), Pseudotumor cerebri, Raynaud's disease, Rectal bleeding, RLS (restless legs syndrome), Scleroderma ([address] Sjogren's disease ([address] Thyroid nodule, and TIA (transient ischemic attack). Past Surgical History:  has a past surgical history that includes Hysterectomy; Cholecystectomy; Appendectomy; Carpal tunnel release (Bilateral); Port Surgery; and Pyloroplasty. Social History:  reports that she has never smoked. She has never used smokeless tobacco. She reports that she does not drink alcohol or use drugs. Family History: family history includes Alzheimer's Disease in her father, mother, paternal aunt, and paternal uncle; Hypertension in her brother; Osteoporosis in her sister; Stroke in her sister.      The patients home medications have been reviewed. Allergies: Diamox [acetazolamide]; Abilify [aripiprazole]; Adhesive tape; Aspartame and phenylalanine; Cymbalta [duloxetine hcl]; Darvon [propoxyphene]; Dilaudid [hydromorphone hcl]; Doxepin; Doxycycline; Effexor [venlafaxine]; Grapeseed extract [nutritional supplements]; Hydrochlorothiazide; Iron; Levaquin [levofloxacin in d5w]; Other; Pecan nut (diagnostic); Red dye; Reglan [metoclopramide]; Rocephin [ceftriaxone]; Rosemary oil; Sulfa antibiotics; Tetracyclines & related; Topamax [topiramate]; Tramadol; Triptans; Tylenol [acetaminophen];  Lige Doing nut oil]; and Zithromax [azithromycin]    -------------------------------------------------- RESULTS -------------------------------------------------  All laboratory and radiology results have been personally reviewed by myself   LABS:  Results for orders placed or performed during the hospital encounter of 10/13/20   CBC Auto Differential   Result Value Ref Range    WBC 5.9 4.5 - 11.5 E9/L    RBC 4.09 3.50 - 5.50 E12/L    Hemoglobin 12.5 11.5 - 15.5 g/dL    Hematocrit 37.8 34.0 - 48.0 %    MCV 92.4 80.0 - 99.9 fL    MCH 30.6 26.0 - 35.0 pg    MCHC 33.1 32.0 - 34.5 %    RDW 13.3 11.5 - 15.0 fL    Platelets 740 170 - 389 E9/L    MPV 11.0 7.0 - 12.0 fL    Neutrophils % 54.5 43.0 - 80.0 %    Immature Granulocytes % 0.3 0.0 - 5.0 %    Lymphocytes % 37.1 20.0 - 42.0 %    Monocytes % 6.6 2.0 - 12.0 %    Eosinophils % 1.2 0.0 - 6.0 %    Basophils % 0.3 0.0 - 2.0 %    Neutrophils Absolute 3.23 1.80 - 7.30 E9/L    Immature Granulocytes # 0.02 E9/L    Lymphocytes Absolute 2.20 1.50 - 4.00 E9/L    Monocytes Absolute 0.39 0.10 - 0.95 E9/L    Eosinophils Absolute 0.07 0.05 - 0.50 E9/L    Basophils Absolute 0.02 0.00 - 0.20 E9/L   Comprehensive Metabolic Panel w/ Reflex to MG   Result Value Ref Range    Sodium 132 132 - 146 mmol/L    Potassium reflex Magnesium 4.6 3.5 - 5.0 mmol/L    Chloride 103 98 - 107 mmol/L    CO2 23 22 - 29 mmol/L    Anion Gap 6 (L) 7 - 16 mmol/L    Glucose 204 (H) 74 - 99 mg/dL    BUN 14 6 - 20 mg/dL    CREATININE 0.9 0.5 - 1.0 mg/dL    GFR Non-African American >60 >=60 mL/min/1.73    GFR African American >60     Calcium 9.4 8.6 - 10.2 mg/dL    Total Protein 6.1 (L) 6.4 - 8.3 g/dL    Alb 3.7 3.5 - 5.2 g/dL    Total Bilirubin 0.3 0.0 - 1.2 mg/dL    Alkaline Phosphatase 53 35 - 104 U/L    ALT 21 0 - 32 U/L    AST 28 0 - 31 U/L   Urinalysis, reflex to microscopic   Result Value Ref Range    Color, UA Yellow Straw/Yellow    Clarity, UA Clear Clear    Glucose, Ur Negative Negative mg/dL    Bilirubin Urine Negative Negative    Ketones, Urine Negative Negative mg/dL    Specific Gravity, UA 1.015 1.005 - 1.030    Blood, Urine Negative Negative    pH, UA 8.5 5.0 - 9.0    Protein, UA Negative Negative mg/dL    Urobilinogen, Urine 0.2 <2.0 E.U./dL    Nitrite, Urine Negative Negative    Leukocyte Esterase, Urine Negative Negative   Lactic Acid, Plasma   Result Value Ref Range    Lactic Acid 0.8 0.5 - 2.2 mmol/L       RADIOLOGY:  Interpreted by Radiologist.  CT ABDOMEN PELVIS W IV CONTRAST Additional Contrast? None   Final Result   1. Unchanged prominent amount of stool throughout the colon. Correlate for   constipation. 2. Nonspecific air-fluid levels in nondilated bowel. This may reflect ileus   or enteritis. 3. Benign-appearing cystic mass in the right adnexa is slightly larger, now   measuring 4 x 1.8 cm as compared to 3.3 x 1.6 cm on the previous. 4. Patient appears status post appendectomy. ------------------------- NURSING NOTES AND VITALS REVIEWED ---------------------------   The nursing notes within the ED encounter and vital signs as below have been reviewed.    /77   Pulse 102   Temp 98.2 °F (36.8 °C) (Oral)   Resp 20   Ht 5' 2\" (1.575 m)   Wt 190 lb (86.2 kg)   SpO2 98%   BMI 34.75 kg/m²   Oxygen Saturation Interpretation: Normal      ---------------------------------------------------PHYSICAL EXAM--------------------------------------      Constitutional/General: Alert and oriented x3, well appearing, non toxic in moderate distress secondary to pain  Head: NC/AT  Eyes: PERRL, EOMI  Mouth: Oropharynx clear, handling secretions, no trismus  Neck: Supple, full ROM, no meningeal signs  Pulmonary: Lungs clear to auscultation bilaterally, no wheezes, rales, or rhonchi. Not in respiratory distress  Cardiovascular:  Regular rate and rhythm, no murmurs, gallops, or rubs. 2+ distal pulses  Abdomen: Soft, moderate right adnexal right lower quadrant tenderness to palpation with guarding but no rebound, non distended,   Extremities: Moves all extremities x 4. Warm and well perfused  Skin: warm and dry without rash  Neurologic: GCS 15,  Psych: Normal Affect      ------------------------------ ED COURSE/MEDICAL DECISION MAKING----------------------  Medications   sodium chloride flush 0.9 % injection 10 mL (10 mLs Intravenous Given 10/13/20 0221)   0.9 % sodium chloride bolus (1,000 mLs Intravenous New Bag 10/13/20 0257)   0.9 % sodium chloride bolus (0 mLs Intravenous Stopped 10/13/20 0257)   ketorolac (TORADOL) injection 15 mg (15 mg Intravenous Given 10/13/20 0210)   ondansetron (ZOFRAN) injection 4 mg (4 mg Intravenous Given 10/13/20 0210)   morphine sulfate (PF) injection 4 mg (4 mg Intravenous Given 10/13/20 0210)   iopamidol (ISOVUE-370) 76 % injection 75 mL (75 mLs Intravenous Given 10/13/20 0224)         Medical Decision Making:    Right adnexal right lower quadrant pain rule out ovarian cyst versus torsion versus appendicitis    Counseling: The emergency provider has spoken with the patient and discussed todays results, in addition to providing specific details for the plan of care and counseling regarding the diagnosis and prognosis.   Questions are answered at this time and they are agreeable with the plan.      --------------------------------- IMPRESSION AND DISPOSITION ---------------------------------    IMPRESSION  1.  Right ovarian cyst New Problem       DISPOSITION  Disposition: Discharge to home  Patient condition is stable      ED course: Patient feeling much better after medications and labs unremarkable-we will give 1 more liter of fluid because patient feels lightheaded after receiving medications            Kit Cordero MD  10/13/20 5872

## 2020-10-14 ENCOUNTER — TELEPHONE (OUTPATIENT)
Dept: PRIMARY CARE CLINIC | Age: 45
End: 2020-10-14

## 2020-10-14 ENCOUNTER — VIRTUAL VISIT (OUTPATIENT)
Dept: PRIMARY CARE CLINIC | Age: 45
End: 2020-10-14
Payer: MEDICAID

## 2020-10-14 LAB — URINE CULTURE, ROUTINE: NORMAL

## 2020-10-14 PROCEDURE — 99213 OFFICE O/P EST LOW 20 MIN: CPT | Performed by: INTERNAL MEDICINE

## 2020-10-14 ASSESSMENT — ENCOUNTER SYMPTOMS
SORE THROAT: 0
NAUSEA: 1
COUGH: 0
ABDOMINAL PAIN: 0
VOMITING: 0

## 2020-10-14 NOTE — PROGRESS NOTES
10/15/2020    Name: Jimena Robison : 1975 Sex: female  Age: 45 y.o. Subjective:  Chief Complaint   Patient presents with    Other     f/u from ER cyst on overy        Other   Associated symptoms include fatigue, headaches, myalgias, nausea, neck pain and weakness. Pertinent negatives include no abdominal pain, arthralgias, chest pain, congestion, coughing, joint swelling, numbness, rash, sore throat or vomiting. Patient was hospitalized at [facility] with abdominal pain on 2020. CT of the abdomen showed that she she could have passed a stone. There was also a subcutaneous nodule in the left mid lower anterior abdominal wall measuring 1.3 cm. That will need to be addressed. They mention 2 cysts in the right ovary the largest one measuring 1.3 cm. She also had stool in her bowel consistent with constipation. There was mention of an oval-shaped 11 x 14 homogeneous density calcification in the medial aspect of the proximal descending colon. Of unknown etiology. She saw Dr. Shraddha Saunders for her constipation and he tried her on. Trulance. Reviewed his note. She could not tolerate the Trulance. She then presented to [facility] ED on 10/8/2020 with severe abdominal pain again. The second CT of her abdomen and pelvis meant to neither the subcutaneous nodule or the calcification that was noted on the first CT scan. They noticed a 3.3 x 1.5 cm cyst in the right ovary and a 2.3 cm cyst peripherally in the right ovary. They thought the second one might be hemorrhagic cyst.  She was treated and released with instructions to follow-up with a gynecologist    Most recently she presented again to the ED at [facility] on 10/13/2020 again with severe abdominal pain. The third CT of her abdomen within the month was done which showed possible ileus or enteritis.   The cystic mass in the right adnexa was slightly larger at 4 x 1.8 centimeters. Again referral to surgeon and GYN was recommended. She was given Toradol. She has an appointment with Evie Isabel this Friday to address the  2 findings that were mentioned on the first CT tbut never  were never commented on in the subsequent 2 CT scans. She also has an appoint with her gynecologist Dr. Monika Mao. Recommendation was that she have an ultrasound of her pelvis which we have ordered. Labs reviewed from 3 ED admissions and they were all essentially unremarkable except for elevated blood sugars. Problem #1: Hypertension  Blood pressures are fairly reasonable on present medications    Problem #2 history of pseudotumor cerebri  She follows up with Dr. Carolina Marks. She had a recent lumbar puncture which was unremarkable. Problem #3 insulin requiring type 2 diabetes mellitus  Blood sugars are acceptable on present dose of insulin. She follows up with an endocrinologist in the [place] area. Problem #4 GI complaints  She has gastroparesis, dysphagia,She has had esophageal dilatations in the past, GERD irritable bowel syndrome with alternating constipation diarrhea. Nonalcoholic fatty liver disease. Problem #5: Common variable immunodeficiency-IgG. She gets IgG infusions on a regular basis, these are ordered by her hematologist.  She also has MTHFR mutation she has a history of iron deficiency anemia    Problem #5: Hypothyroidism  She follows with her endocrinologist for this as well. She has a known thyroid nodule    Problem #6: Depression  She is on antidepressants and antianxiety medications. She follows with her psychiatrist    Problem #7: Respiratory problems  She has a history of asthma and obstructive sleep apnea on a CPAP. She has a known lung nodule, right upper lobe discovered in 2018    . Problem #8: Connective tissue disorder  She has some symptoms of Sjogren's syndrome.   We are not sure whether she also has an overlap syndrome with another connective tissue problem. 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 Problem #9: Musculoskeletal problems  She has a history of fibromyalgia, chronic cervicalgia. MRI of her cervical spine was done recently. Reviewed report. History of restless leg syndrome. She has history of degenerative disc disease and cervical spondylosis    Problem #10: Migraines  She follows up with neurologist who has her on different medications. She has an accidental medication error in [facility]. They say she is on or has been on Suboxone but she never had this medication. Apparently another Thierry Pages had this medicine but [facility] refuses to correct this per patient report    She has a history of kidney stones, hyperlipidemia, and cysts of the right breast.    She recently saw Dr. Valencia Reyes for her neurological problems and after evaluation patient decided to go back to her previous neurologist who is a nurse practitioner for Dr. Uriostegui More is    We had a discussion about resuscitation directives. She is not sure about the difference between DNR CC arrest and DNR CC. I explained this to her. She is decided to be a DNR CC only. DNR paper filled out.            Current Outpatient Medications:     Plecanatide (TRULANCE) 3 MG TABS, Take by mouth daily, Disp: , Rfl:     ketorolac (TORADOL) 10 MG tablet, Take 1 tablet by mouth every 8 hours as needed for Pain, Disp: 15 tablet, Rfl: 0    ondansetron (ZOFRAN) 4 MG tablet, Take 1 tablet by mouth every 8 hours as needed for Nausea or Vomiting, Disp: 12 tablet, Rfl: 0    dicyclomine (BENTYL) 20 MG tablet, Take 1 tablet by mouth 4 times daily, Disp: 30 tablet, Rfl: 0    polyethylene glycol (GLYCOLAX) 17 g packet, Take 17 g by mouth 2 times daily as needed for Constipation, Disp: 527 g, Rfl: 2    propranolol (INDERAL) 10 MG tablet, Take 1 tablet by mouth 3 times daily, Disp: 90 tablet, Rfl: 3    furosemide (LASIX) 20 MG tablet, Take 1 tablet by mouth daily, Disp: 60 tablet, Rfl: 3    Nitrofurantoin Monohyd Macro (MACROBID PO), Take by mouth, Disp: , Rfl:     CARAFATE 1 g tablet, Take 1 twice daily with meals, Disp: 60 tablet, Rfl: 3    sodium chloride flush 0.9 % injection, Infuse 10 mLs intravenously as needed for Line Care, Disp: 20 mL, Rfl: 3    aspirin 81 MG EC tablet, Take 81 mg by mouth daily, Disp: , Rfl:     atorvastatin (LIPITOR) 10 MG tablet, Take 1 tablet by mouth daily, Disp: 90 tablet, Rfl: 1    rOPINIRole (REQUIP) 0.5 MG tablet, Take 1 tablet by mouth See Admin Instructions 1 tablet - breakfast 2 tablets - supper, Disp: 270 tablet, Rfl: 1    levothyroxine (SYNTHROID) 50 MCG tablet, Take 2.5 tablets by mouth Five times weekly Given Monday,Tuesday,Wednesday,Thursday,Friday, Disp: 90 tablet, Rfl: 1    levothyroxine (SYNTHROID) 50 MCG tablet, Take 2 tablets by mouth Twice a Week Given Saturday,Sunday, Disp: 24 tablet, Rfl: 1    pantoprazole (PROTONIX) 40 MG tablet, Take 1 tablet by mouth every morning (before breakfast), Disp: 90 tablet, Rfl: 1    folic acid (FOLVITE) 1 MG tablet, Take 1 tablet by mouth Daily with lunch, Disp: 90 tablet, Rfl: 1    acetaminophen (TYLENOL) 325 MG tablet, Take 650 mg by mouth every 6 hours as needed for Pain, Disp: , Rfl:     sodium chloride (OCEAN, BABY AYR) 0.65 % nasal spray, 1 spray by Nasal route 4 times daily as needed for Congestion, Disp: , Rfl:     azelastine (ASTELIN) 0.1 % nasal spray, 1 spray by Nasal route 2 times daily, Disp: , Rfl:     diphenhydrAMINE (BENYLIN) 12.5 MG/5ML liquid, Take 25 mg by mouth 4 times daily as needed for Allergies (migrane break through), Disp: , Rfl:     Artificial Saliva (BIOTENE DRY MOUTH MOISTURIZING MT), Take 1 spray by mouth every 3 hours, Disp: , Rfl:     clotrimazole (LOTRIMIN) 1 % cream, Apply topically 2 times daily as needed Under breast and abdominal folds, Disp: , Rfl:     OIL OF OREGANO PO, Take 60 mg by mouth daily (with breakfast), Disp: , Rfl:     Bacillus Coagulans-Inulin (PROBIOTIC FORMULA) 1-250 BILLION-MG CAPS, Take 1 capsule by mouth nightly, Disp: , Rfl:     miconazole (MICOTIN) 2 % powder, Apply topically daily Apply to groin, under breast and skin folds. , Disp: , Rfl:     magnesium hydroxide (MILK OF MAGNESIA) 400 MG/5ML suspension, Take 30 mLs by mouth 4 times daily as needed for Constipation, Disp: , Rfl:     NONFORMULARY, Apply topically daily as needed (joint pain) Natural T Relief Pain Cream, Disp: , Rfl:     SENNA LEAVES PO, Take 470 mg by mouth three times daily , Disp: , Rfl:     promethazine (PHENERGAN) 25 MG tablet, Take 25 mg by mouth every 6 hours as needed for Nausea, Disp: , Rfl:     Magnesium 500 MG CAPS, Take 500 mg by mouth 2 times daily, Disp: , Rfl:     tiZANidine (ZANAFLEX) 2 MG tablet, Take 2 mg by mouth nightly, Disp: , Rfl:     triamcinolone (KENALOG) 0.1 % cream, Apply topically daily as needed (rash), Disp: , Rfl:     tetrahydrozoline 0.05 % ophthalmic solution, Place 2 drops into both eyes 3 times daily, Disp: , Rfl:     NONFORMULARY, Take 2 capsules by mouth 2 times daily (with meals) Turmeric Ginger, Disp: , Rfl:     potassium chloride (KLOR-CON) 20 MEQ packet, Take 20 mEq by mouth daily, Disp: 90 packet, Rfl: 2    levomilnacipran (FETZIMA) 40 MG CP24 extended release capsule, Take 40 mg by mouth daily, Disp: , Rfl:     tiZANidine (ZANAFLEX) 2 MG tablet, Take 1 tablet by mouth 2 times daily (Patient taking differently: Take 2 mg by mouth 2 times daily Taking 0.5 - 1 tab if needed), Disp: 180 tablet, Rfl: 2    Immune Globulin, Human, (GAMMAGARD) 10 GM/100ML SOLN solution, 40 g by Intravenous (Continuous Infusion) route every 30 days , Disp: , Rfl:     Galcanezumab-gnlm (EMGALITY) 120 MG/ML SOAJ, Inject 120 mg into the skin every 30 days , Disp: , Rfl:     clonazePAM (KLONOPIN) 0.5 MG tablet, Take 0.5 mg by mouth 2 times daily. ., Disp: , Rfl:     divalproex (DEPAKOTE) 250 MG DR tablet, Take Tramadol     Triptans     Tylenol [Acetaminophen]     Prospect [Macadamia Nut Oil]     Zithromax [Azithromycin]         Past Medical History:   Diagnosis Date    Anemia     Anxiety     Asthma     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     Connective tissue disease ([address]     Cyst of right breast     DDD (degenerative disc disease), cervical     Depression     Diabetes mellitus (HCC)     Dysphagia     Fibromyalgia     Gastroparesis     GERD (gastroesophageal reflux disease)     Headache     Hematuria     Hyperlipidemia     Hypertension     Hypothyroidism     IBS (irritable bowel syndrome)     with constipation and diarrhea    Immune deficiency disorder (HCC)     Insomnia     Kidney stones     Meningitis     x4--- twice after IVIG     PAULINE on CPAP     cpap dependence    PCOS (polycystic ovarian syndrome)     POTS (postural orthostatic tachycardia syndrome)     Pseudotumor cerebri     in 2008--- treated with LP; allergy to diamox    Raynaud's disease     Rectal bleeding     RLS (restless legs syndrome)     Scleroderma ([address]     Sjogren's disease ([address]     Thyroid nodule     TIA (transient ischemic attack)        Health Maintenance Due   Topic Date Due    Diabetic foot exam  12/18/1987    Diabetic retinal exam  12/18/1987    HIV screen  12/18/1992    Diabetic microalbuminuria test  12/18/1995    Hepatitis B vaccine (1 of 3 - Risk 3-dose series) 12/18/1996    Cervical cancer screen  12/18/1998        Patient Active Problem List   Diagnosis    Vision loss, bilateral    Type 2 diabetes mellitus without complication, with long-term current use of insulin (HCC)    Thyroid nodule    Solitary cyst of right breast    Sjogren's syndrome with keratoconjunctivitis sicca (HCC)    Retention of urine    Restless legs    Raynaud's phenomenon without gangrene    Primary fibromyalgia syndrome    Postural orthostatic tachycardia syndrome    PAULINE on CPAP    Obesity, Class II, BMI 79 Cristina Ramirez,   10/15/2020     Vee Dill is a 45 y.o. female evaluated via telephone on 10/14/2020. Consent:  She and/or health care decision maker is aware that that she may receive a bill for this telephone service, depending on her insurance coverage, and has provided verbal consent to proceed: Yes      Documentation:  I communicated with the patient and/or health care decision maker about abdominal pain. Details of this discussion including any medical advice provided: See above    Patient was located in her [facility] home address, PCP located at other [facility] address. No one else was involved in this call    I affirm this is a Patient Initiated Episode with a Patient who has not had a related appointment within my department in the past 7 days or scheduled within the next 24 hours.     Patient identification was verified at the start of the visit: Yes    Total Time: minutes: 11-20 minutes    Note: not billable if this call serves to triage the patient into an appointment for the relevant concern      Suzanna Garcia

## 2020-10-14 NOTE — TELEPHONE ENCOUNTER
Please set this patient up for virtual visit telephone this afternoon as this is too complicated for me to address in a note.

## 2020-10-15 PROBLEM — R10.30 LOWER ABDOMINAL PAIN: Status: ACTIVE | Noted: 2020-10-15

## 2020-10-16 ENCOUNTER — OFFICE VISIT (OUTPATIENT)
Dept: SURGERY | Age: 45
End: 2020-10-16
Payer: MEDICAID

## 2020-10-16 ENCOUNTER — HOSPITAL ENCOUNTER (EMERGENCY)
Age: 45
Discharge: HOME | End: 2020-10-16
Attending: EMERGENCY MEDICINE
Payer: MEDICAID

## 2020-10-16 VITALS
OXYGEN SATURATION: 99 % | HEIGHT: 62 IN | TEMPERATURE: 97.7 F | RESPIRATION RATE: 16 BRPM | DIASTOLIC BLOOD PRESSURE: 76 MMHG | SYSTOLIC BLOOD PRESSURE: 128 MMHG | HEART RATE: 85 BPM | BODY MASS INDEX: 36.44 KG/M2 | WEIGHT: 198 LBS

## 2020-10-16 VITALS
HEART RATE: 100 BPM | OXYGEN SATURATION: 100 % | BODY MASS INDEX: 36.44 KG/M2 | SYSTOLIC BLOOD PRESSURE: 140 MMHG | WEIGHT: 198 LBS | TEMPERATURE: 97.1 F | HEIGHT: 62 IN | DIASTOLIC BLOOD PRESSURE: 95 MMHG | RESPIRATION RATE: 16 BRPM

## 2020-10-16 LAB
ALBUMIN SERPL-MCNC: 3.9 G/DL (ref 3.5–5.2)
ALP BLD-CCNC: 59 U/L (ref 35–104)
ALT SERPL-CCNC: 24 U/L (ref 0–32)
ANION GAP SERPL CALCULATED.3IONS-SCNC: 6 MMOL/L (ref 7–16)
AST SERPL-CCNC: 21 U/L (ref 0–31)
BASOPHILS ABSOLUTE: 0.05 E9/L (ref 0–0.2)
BASOPHILS RELATIVE PERCENT: 0.9 % (ref 0–2)
BILIRUB SERPL-MCNC: 0.4 MG/DL (ref 0–1.2)
BILIRUBIN URINE: NEGATIVE
BLOOD, URINE: NEGATIVE
BUN BLDV-MCNC: 13 MG/DL (ref 6–20)
CALCIUM SERPL-MCNC: 9.1 MG/DL (ref 8.6–10.2)
CHLORIDE BLD-SCNC: 104 MMOL/L (ref 98–107)
CLARITY: CLEAR
CO2: 27 MMOL/L (ref 22–29)
COLOR: YELLOW
CREAT SERPL-MCNC: 0.8 MG/DL (ref 0.5–1)
EOSINOPHILS ABSOLUTE: 0.08 E9/L (ref 0.05–0.5)
EOSINOPHILS RELATIVE PERCENT: 1.4 % (ref 0–6)
GFR AFRICAN AMERICAN: >60
GFR NON-AFRICAN AMERICAN: >60 ML/MIN/1.73
GLUCOSE BLD-MCNC: 177 MG/DL (ref 74–99)
GLUCOSE URINE: NEGATIVE MG/DL
HCT VFR BLD CALC: 38 % (ref 34–48)
HEMOGLOBIN: 12 G/DL (ref 11.5–15.5)
IMMATURE GRANULOCYTES #: 0.01 E9/L
IMMATURE GRANULOCYTES %: 0.2 % (ref 0–5)
KETONES, URINE: NEGATIVE MG/DL
LACTIC ACID: 0.6 MMOL/L (ref 0.5–2.2)
LEUKOCYTE ESTERASE, URINE: NEGATIVE
LIPASE: 40 U/L (ref 13–60)
LYMPHOCYTES ABSOLUTE: 2.13 E9/L (ref 1.5–4)
LYMPHOCYTES RELATIVE PERCENT: 37.2 % (ref 20–42)
MCH RBC QN AUTO: 30.3 PG (ref 26–35)
MCHC RBC AUTO-ENTMCNC: 31.6 % (ref 32–34.5)
MCV RBC AUTO: 96 FL (ref 80–99.9)
MONOCYTES ABSOLUTE: 0.36 E9/L (ref 0.1–0.95)
MONOCYTES RELATIVE PERCENT: 6.3 % (ref 2–12)
NEUTROPHILS ABSOLUTE: 3.1 E9/L (ref 1.8–7.3)
NEUTROPHILS RELATIVE PERCENT: 54 % (ref 43–80)
NITRITE, URINE: NEGATIVE
PDW BLD-RTO: 13.2 FL (ref 11.5–15)
PH UA: 7.5 (ref 5–9)
PLATELET # BLD: 200 E9/L (ref 130–450)
PMV BLD AUTO: 10.6 FL (ref 7–12)
POTASSIUM REFLEX MAGNESIUM: 4 MMOL/L (ref 3.5–5)
PROTEIN UA: NEGATIVE MG/DL
RBC # BLD: 3.96 E12/L (ref 3.5–5.5)
SODIUM BLD-SCNC: 137 MMOL/L (ref 132–146)
SPECIFIC GRAVITY UA: 1.01 (ref 1–1.03)
TOTAL PROTEIN: 6.4 G/DL (ref 6.4–8.3)
UROBILINOGEN, URINE: 0.2 E.U./DL
WBC # BLD: 5.7 E9/L (ref 4.5–11.5)

## 2020-10-16 PROCEDURE — 96374 THER/PROPH/DIAG INJ IV PUSH: CPT

## 2020-10-16 PROCEDURE — 85025 COMPLETE CBC W/AUTO DIFF WBC: CPT

## 2020-10-16 PROCEDURE — G8482 FLU IMMUNIZE ORDER/ADMIN: HCPCS | Performed by: SURGERY

## 2020-10-16 PROCEDURE — 6360000002 HC RX W HCPCS: Performed by: FAMILY MEDICINE

## 2020-10-16 PROCEDURE — 1036F TOBACCO NON-USER: CPT | Performed by: SURGERY

## 2020-10-16 PROCEDURE — G8428 CUR MEDS NOT DOCUMENT: HCPCS | Performed by: SURGERY

## 2020-10-16 PROCEDURE — G8417 CALC BMI ABV UP PARAM F/U: HCPCS | Performed by: SURGERY

## 2020-10-16 PROCEDURE — 99284 EMERGENCY DEPT VISIT MOD MDM: CPT

## 2020-10-16 PROCEDURE — 96372 THER/PROPH/DIAG INJ SC/IM: CPT

## 2020-10-16 PROCEDURE — 2580000003 HC RX 258: Performed by: FAMILY MEDICINE

## 2020-10-16 PROCEDURE — 80053 COMPREHEN METABOLIC PANEL: CPT

## 2020-10-16 PROCEDURE — 83690 ASSAY OF LIPASE: CPT

## 2020-10-16 PROCEDURE — 83605 ASSAY OF LACTIC ACID: CPT

## 2020-10-16 PROCEDURE — 99203 OFFICE O/P NEW LOW 30 MIN: CPT | Performed by: SURGERY

## 2020-10-16 PROCEDURE — 81003 URINALYSIS AUTO W/O SCOPE: CPT

## 2020-10-16 RX ORDER — HYDROCODONE BITARTRATE AND ACETAMINOPHEN 5; 325 MG/1; MG/1
1 TABLET ORAL EVERY 6 HOURS PRN
Qty: 10 TABLET | Refills: 0 | Status: SHIPPED | OUTPATIENT
Start: 2020-10-16 | End: 2020-10-19

## 2020-10-16 RX ORDER — 0.9 % SODIUM CHLORIDE 0.9 %
1000 INTRAVENOUS SOLUTION INTRAVENOUS ONCE
Status: COMPLETED | OUTPATIENT
Start: 2020-10-16 | End: 2020-10-16

## 2020-10-16 RX ORDER — KETOROLAC TROMETHAMINE 30 MG/ML
30 INJECTION, SOLUTION INTRAMUSCULAR; INTRAVENOUS ONCE
Status: COMPLETED | OUTPATIENT
Start: 2020-10-16 | End: 2020-10-16

## 2020-10-16 RX ORDER — ONDANSETRON 2 MG/ML
4 INJECTION INTRAMUSCULAR; INTRAVENOUS ONCE
Status: COMPLETED | OUTPATIENT
Start: 2020-10-16 | End: 2020-10-16

## 2020-10-16 RX ADMIN — ONDANSETRON 4 MG: 2 INJECTION INTRAMUSCULAR; INTRAVENOUS at 19:19

## 2020-10-16 RX ADMIN — SODIUM CHLORIDE 1000 ML: 9 INJECTION, SOLUTION INTRAVENOUS at 19:19

## 2020-10-16 RX ADMIN — KETOROLAC TROMETHAMINE 30 MG: 30 INJECTION, SOLUTION INTRAMUSCULAR at 19:19

## 2020-10-16 ASSESSMENT — ENCOUNTER SYMPTOMS
ABDOMINAL PAIN: 1
SINUS PAIN: 0
PHOTOPHOBIA: 0
SORE THROAT: 0
NAUSEA: 1
ANAL BLEEDING: 0
DIARRHEA: 0
VOMITING: 0
WHEEZING: 0
BACK PAIN: 0
SHORTNESS OF BREATH: 0

## 2020-10-16 ASSESSMENT — PAIN DESCRIPTION - FREQUENCY: FREQUENCY: CONTINUOUS

## 2020-10-16 ASSESSMENT — PAIN DESCRIPTION - PAIN TYPE: TYPE: ACUTE PAIN

## 2020-10-16 ASSESSMENT — PAIN SCALES - GENERAL: PAINLEVEL_OUTOF10: 7

## 2020-10-16 ASSESSMENT — PAIN DESCRIPTION - LOCATION: LOCATION: ABDOMEN

## 2020-10-16 ASSESSMENT — PAIN DESCRIPTION - ONSET: ONSET: GRADUAL

## 2020-10-16 ASSESSMENT — PAIN DESCRIPTION - ORIENTATION: ORIENTATION: LOWER

## 2020-10-16 ASSESSMENT — PAIN DESCRIPTION - DESCRIPTORS: DESCRIPTORS: ACHING

## 2020-10-16 ASSESSMENT — PAIN DESCRIPTION - PROGRESSION: CLINICAL_PROGRESSION: GRADUALLY WORSENING

## 2020-10-16 NOTE — ED PROVIDER NOTES
KERRY Henry is a 45 y.o. female with a PMHx significant for diabetes, hypothyroidism, PAULINE, fibromyalgia, hysterectomy in 2017 who presents with right-sided abdominal pain. The patient states that the symptoms began October 13. He describes it as a sharp pain in her right lower quadrant. He was seen in the ER on the 13th and had a CAT scan revealing an ovarian cyst.  Followed up with her OB/GYN and had an ultrasound done. Her OB/GYN called her today and recommended she come to [facility] to be evaluated by the on-call doctor. She continues to have the pain. She does have some nausea but has not had any vomiting. Has had decreased urine and fecal output. Denies measured fever but states she has been having some chills. Denies any blood in stool or urine denies any vaginal symptoms including discharge or bleeding. The patient states that rest, Toradol makes the symptoms better and movement makes them worse. The patient has tried their normal medications including Toradol without getting meaningful relief of symptoms. The patient denies recent trauma, fatigue, HA, dizziness, vision changes, chest pain, palpitations, hx of MI, SOB, cough, wheezing, V/D/C, hematochezia, melena, dysuria, hematuria, generalized weakness and paresthesias. The patient is currently taking no blood thinners. Tobacco Hx:   reports that she has never smoked. She has never used smokeless tobacco.    Alcohol Hx:   reports no history of alcohol use. The history is provided by the patient. Review of Systems   Constitutional: Positive for chills. Negative for fever. HENT: Negative for sinus pain and sore throat. Eyes: Negative for photophobia and visual disturbance. Respiratory: Negative for shortness of breath and wheezing. Cardiovascular: Negative for chest pain, palpitations and leg swelling. Gastrointestinal: Positive for abdominal pain and nausea. Negative for anal bleeding, diarrhea and vomiting. Genitourinary: Negative for dysuria, frequency, hematuria, urgency, vaginal bleeding and vaginal discharge. Musculoskeletal: Negative for back pain and joint swelling. Skin: Negative for rash and wound. Neurological: Negative for dizziness, syncope, weakness, light-headedness and numbness. Physical Exam  Vitals signs reviewed. Constitutional:       General: She is not in acute distress. Appearance: Normal appearance. She is not ill-appearing or diaphoretic. HENT:      Head: Normocephalic and atraumatic. Mouth/Throat:      Mouth: Mucous membranes are moist.      Pharynx: Oropharynx is clear. No oropharyngeal exudate or posterior oropharyngeal erythema. Eyes:      Conjunctiva/sclera: Conjunctivae normal.      Pupils: Pupils are equal, round, and reactive to light. Cardiovascular:      Rate and Rhythm: Normal rate and regular rhythm. Heart sounds: Normal heart sounds. No murmur. Pulmonary:      Effort: Pulmonary effort is normal. No respiratory distress. Breath sounds: Normal breath sounds. No wheezing. Abdominal:      General: Bowel sounds are normal. There is no distension. Palpations: Abdomen is soft. Tenderness: There is abdominal tenderness (Right lower quadrant). There is no guarding. Musculoskeletal: Normal range of motion. General: No swelling or tenderness. Right lower leg: No edema. Left lower leg: No edema. Skin:     General: Skin is warm and dry. Findings: Bruising (Bruising on abdomen insulin injection site) present. Neurological:      General: No focal deficit present. Mental Status: She is alert and oriented to person, place, and time. Sensory: No sensory deficit. Motor: No weakness. Patient presents to the ED for right-sided abdominal pain. Patient was sent from her OB/GYN in [facility] for evaluation. Her OB/GYN does not do surgery. She has ultrasound from yesterday showing hydrosalpinx. There was good blood flow to the ovary. She has had multiple CAT scans over the past couple weeks showing the cyst.  Patient was given Toradol and IV normal saline for their symptoms with good improvement. Discussed the case with  [facility] who recommended patient establish with a new OB/GYN who actually does surgery. Patient actually saw a general surgeon today who told her he was not OB and did not remove these kinds of things. Patient continues to be non-toxic on re-evaluation. Findings were discussed with the patient and reasons to immediately return to the ED were articulated to them. They will follow-up with their PMD and OB/GYN. Patient will be given a short course of Norco for breakthrough pain and given instructions to only take as needed. PDMP reviewed. ED Course as of Oct 16 2038   Fri Oct 16, 2020   1950 Discussed case with nurse practitioner from the [facility] office where she was seen. He states that the note from the office just recommended that she was to the ER for further evaluation. No specific recommendations in the note. He did not see this patient is just the person on call so is unable to provide any more information at this time. Awaiting further lab work but at this time white blood cell count unremarkable and urinalysis negative. []   2032 Discussed case with  [facility]. She states patient should follow-up outpatient with a different OB as she is likely being sent here due to the fact that her OB does not do surgery. Patient is safe stable and does not have an acute abdomen at this time.   White blood cell count and lactate are normal.    []      ED Course User Index  [AH] Yesenia Ricketts MD       --------------------------------------------- PAST HISTORY ---------------------------------------------  Past Medical History:  has a past medical history of Anemia, Anxiety, Asthma, Blind, Connective tissue disease ([address] Cyst of right breast, DDD (degenerative disc disease), cervical, Depression, Diabetes mellitus ([facility] Dysphagia, Fibromyalgia, Gastroparesis, GERD (gastroesophageal reflux disease), Headache, Hematuria, Hyperlipidemia, Hypertension, Hypothyroidism, IBS (irritable bowel syndrome), Immune deficiency disorder ([facility] Insomnia, Kidney stones, Meningitis, PAULINE on CPAP, PCOS (polycystic ovarian syndrome), POTS (postural orthostatic tachycardia syndrome), Pseudotumor cerebri, Raynaud's disease, Rectal bleeding, RLS (restless legs syndrome), Scleroderma ([facility] Sjogren's disease ([facility] Thyroid nodule, and TIA (transient ischemic attack). Past Surgical History:  has a past surgical history that includes Hysterectomy; Cholecystectomy; Appendectomy; Carpal tunnel release (Bilateral); Port Surgery; and Pyloroplasty. Social History:  reports that she has never smoked. She has never used smokeless tobacco. She reports that she does not drink alcohol or use drugs. Family History: family history includes Alzheimer's Disease in her father, mother, paternal aunt, and paternal uncle; Hypertension in her brother; Osteoporosis in her sister; Stroke in her sister. The patients home medications have been reviewed. Allergies: Diamox [acetazolamide]; Abilify [aripiprazole]; Aspartame and phenylalanine; Cymbalta [duloxetine hcl]; Darvon [propoxyphene]; Dilaudid [hydromorphone hcl]; Doxepin; Doxycycline; Duloxetine; Effexor [venlafaxine]; Gluten meal; Grapeseed extract [nutritional supplements]; Hydrochlorothiazide; Iodine; Iron; Levaquin [levofloxacin in d5w]; Meclizine; Milk-related compounds; Other; Pecan nut (diagnostic); Prochlorperazine; Red dye; Reglan [metoclopramide]; Rocephin [ceftriaxone]; Rosemary oil; Sulfa antibiotics; Tetracyclines & related; Topamax [topiramate]; Tramadol; Triptans; [place] Stain nut oil]; Zithromax [azithromycin];  Adhesive tape; and Betamethasone    -------------------------------------------------- RESULTS Negative Negative    pH, UA 7.5 5.0 - 9.0    Protein, UA Negative Negative mg/dL    Urobilinogen, Urine 0.2 <2.0 E.U./dL    Nitrite, Urine Negative Negative    Leukocyte Esterase, Urine Negative Negative   Lactic Acid, Plasma   Result Value Ref Range    Lactic Acid 0.6 0.5 - 2.2 mmol/L       Radiology:  No orders to display       ------------------------- NURSING NOTES AND VITALS REVIEWED ---------------------------  Date / Time Roomed:  10/16/2020  6:04 PM  ED Bed Assignment:  06/06    The nursing notes within the ED encounter and vital signs as below have been reviewed. /86   Pulse 89   Temp 97.7 °F (36.5 °C)   Resp 16   Ht 5' 2\" (1.575 m)   Wt 198 lb (89.8 kg)   SpO2 99%   BMI 36.21 kg/m²   Oxygen Saturation Interpretation: Normal      ------------------------------------------ PROGRESS NOTES ------------------------------------------  8:38 PM EDT  I have spoken with the patient and discussed todays results, in addition to providing specific details for the plan of care and counseling regarding the diagnosis and prognosis. Their questions are answered at this time and they are agreeable with the plan. I discussed at length with them reasons for immediate return here for re evaluation. They will followup with their OB/GYN and primary care physician by calling their office on Monday.      --------------------------------- ADDITIONAL PROVIDER NOTES ---------------------------------  At this time the patient is without objective evidence of an acute process requiring hospitalization or inpatient management. They have remained hemodynamically stable throughout their entire ED visit and are stable for discharge with outpatient follow-up. The plan has been discussed in detail and they are aware of the specific conditions for emergent return, as well as the importance of follow-up.       New Prescriptions    HYDROCODONE-ACETAMINOPHEN (NORCO) 5-325 MG PER TABLET    Take 1 tablet by mouth every 6 hours as needed for Pain for up to 3 days. Intended supply: 3 days. Take lowest dose possible to manage pain       Diagnosis:  1. Right ovarian cyst    2. Hydrosalpinx        Disposition:  Patient's disposition: Discharge to home  Patient's condition is stable.          Daisy Wilson MD  Resident  10/16/20 [id number]

## 2020-10-16 NOTE — PROGRESS NOTES
[address]    General Surgery Attending History and Physical    Patient's Name/Date of Birth: Luci Morales / 1975 (20 y.o.)    Date: October 16, 2020     CC:abdominal mass    HPI:  26-year-old female complains of abdominal mass found on left side about 1 month ago    The patient reported  acute, intermittent pain localized to the mass that started a month ago. The intensity of the pain is mild to moderate. There are no alleviating  factors regarding the pain. Factors include pressure and palpation. The mass does not drain. It has been present for some time. Patient has chronic constipation and sees a GI doctor-Dr. Poppy Swanson at [facility].  She has a colonoscopy scheduled with him on November 10. She takes MiraLAX 1 scoop 2 times a day as well as 3 senna tablets a day. On her recent CT scan, it showed constipation was a calcified mass in her colon.     Patient also was found to have ovarian cysts--her most recent ultrasound from 10/15 shows a tubular structure in the right ovary which measures 4.6 cm    Past Medical History:   Diagnosis Date    Anemia     Anxiety     Asthma     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     Connective tissue disease (HCC)     Cyst of right breast     DDD (degenerative disc disease), cervical     Depression     Diabetes mellitus (HCC)     Dysphagia     Fibromyalgia     Gastroparesis     GERD (gastroesophageal reflux disease)     Headache     Hematuria     Hyperlipidemia     Hypertension     Hypothyroidism     IBS (irritable bowel syndrome)     with constipation and diarrhea    Immune deficiency disorder (HCC)     Insomnia     Kidney stones     Meningitis     x4--- twice after IVIG     PAULINE on CPAP     cpap dependence    PCOS (polycystic ovarian syndrome)     POTS (postural orthostatic tachycardia syndrome)     Pseudotumor cerebri     in 2008--- treated with LP; allergy to diamox    Raynaud's disease     Rectal bleeding     RLS (restless legs syndrome)     Scleroderma (HCC)     Sjogren's disease ([facility] [address]     Thyroid nodule     TIA (transient ischemic attack)        Past Surgical History:   Procedure Laterality Date    APPENDECTOMY      CARPAL TUNNEL RELEASE Bilateral     CHOLECYSTECTOMY      HYSTERECTOMY      PORT SURGERY      x3    PYLOROPLASTY         Current Outpatient Medications   Medication Sig Dispense Refill    aspirin 81 MG EC tablet Take 81 mg by mouth daily      acetaminophen (TYLENOL) 325 MG tablet Take 650 mg by mouth every 6 hours as needed for Pain      azelastine (ASTELIN) 0.1 % nasal spray 1 spray by Nasal route 2 times daily      diphenhydrAMINE (BENYLIN) 12.5 MG/5ML liquid Take 25 mg by mouth 4 times daily as needed for Allergies (migrane break through)      Artificial Saliva (BIOTENE DRY MOUTH MOISTURIZING MT) Take 1 spray by mouth every 3 hours      clotrimazole (LOTRIMIN) 1 % cream Apply topically 2 times daily as needed Under breast and abdominal folds      clonazePAM (KLONOPIN) 0.5 MG tablet Take 0.5 mg by mouth 2 times daily. Shimon Hogan Probiotic Product (PROBIOTIC DAILY PO)       ketorolac (TORADOL) 10 MG tablet Take 1 tablet by mouth every 8 hours as needed for Pain 15 tablet 0    dicyclomine (BENTYL) 20 MG tablet Take 1 tablet by mouth 4 times daily 30 tablet 0    polyethylene glycol (GLYCOLAX) 17 g packet Take 17 g by mouth 2 times daily as needed for Constipation 527 g 2    propranolol (INDERAL) 10 MG tablet Take 1 tablet by mouth 3 times daily 90 tablet 3    furosemide (LASIX) 20 MG tablet Take 1 tablet by mouth daily 60 tablet 3    atorvastatin (LIPITOR) 10 MG tablet Take 1 tablet by mouth daily 90 tablet 1    rOPINIRole (REQUIP) 0.5 MG tablet Take 1 tablet by mouth See Admin Instructions 1 tablet - breakfast  2 tablets - supper 270 tablet 1    levothyroxine (SYNTHROID) 50 MCG tablet Take 2.5 tablets by mouth Five times weekly Given Monday,Tuesday,Wednesday,Thursday,Friday 90 tablet 1    levothyroxine (SYNTHROID) 50 MCG tablet Take 2 tablets by mouth Twice a Week Given Saturday,Sunday 24 tablet 1    pantoprazole (PROTONIX) 40 MG tablet Take 1 tablet by mouth every morning (before breakfast) 90 tablet 1    folic acid (FOLVITE) 1 MG tablet Take 1 tablet by mouth Daily with lunch 90 tablet 1    sodium chloride (OCEAN, BABY AYR) 0.65 % nasal spray 1 spray by Nasal route 4 times daily as needed for Congestion      OIL OF OREGANO PO Take 60 mg by mouth daily (with breakfast)      miconazole (MICOTIN) 2 % powder Apply topically daily Apply to groin, under breast and skin folds.       magnesium hydroxide (MILK OF MAGNESIA) 400 MG/5ML suspension Take 30 mLs by mouth 4 times daily as needed for Constipation      NONFORMULARY Apply topically daily as needed (joint pain) Natural T Relief Pain Cream      SENNA LEAVES PO Take 470 mg by mouth three times daily       promethazine (PHENERGAN) 25 MG tablet Take 25 mg by mouth every 6 hours as needed for Nausea      Magnesium 500 MG CAPS Take 500 mg by mouth 2 times daily      triamcinolone (KENALOG) 0.1 % cream Apply topically daily as needed (rash)      NONFORMULARY Take 2 capsules by mouth 2 times daily (with meals) Turmeric Ginger      potassium chloride (KLOR-CON) 20 MEQ packet Take 20 mEq by mouth daily 90 packet 2    levomilnacipran (FETZIMA) 40 MG CP24 extended release capsule Take 40 mg by mouth daily      tiZANidine (ZANAFLEX) 2 MG tablet Take 1 tablet by mouth 2 times daily (Patient taking differently: Take 2 mg by mouth 2 times daily Taking 0.5 - 1 tab if needed) 180 tablet 2    Immune Globulin, Human, (GAMMAGARD) 10 GM/100ML SOLN solution 40 g by Intravenous (Continuous Infusion) route every 30 days       Galcanezumab-gnlm (EMGALITY) 120 MG/ML SOAJ Inject 120 mg into the skin every 30 days       divalproex (DEPAKOTE) 250 MG DR tablet Take 250 mg by mouth See Admin Instructions 250 mg - AM  250 mg - Afternoon  500 mg - PM  econazole nitrate 1 % cream Apply topically daily as needed (Apply under breasts and folds)       fluticasone (FLONASE) 50 MCG/ACT nasal spray 1 spray by Nasal route 2 times daily       fluticasone-salmeterol (ADVAIR HFA) 230-21 MCG/ACT inhaler Inhale 2 puffs into the lungs 2 times daily      ipratropium-albuterol (DUONEB) 0.5-2.5 (3) MG/3ML SOLN nebulizer solution Inhale 1 vial into the lungs every 4 hours as needed for Shortness of Breath       montelukast (SINGULAIR) 10 MG tablet Take 10 mg by mouth nightly      ondansetron (ZOFRAN-ODT) 8 MG TBDP disintegrating tablet Take 8 mg by mouth every 8 hours as needed for Nausea or Vomiting        No current facility-administered medications for this visit.         Allergies   Allergen Reactions    Diamox [Acetazolamide] Shortness Of Breath and Rash    Abilify [Aripiprazole]      Fevers and Tremors    Aspartame And Phenylalanine Diarrhea     Blood sugar Les, Diarrhea    Cymbalta [Duloxetine Hcl]     Darvon [Propoxyphene]      darvocet    Dilaudid [Hydromorphone Hcl]     Doxepin     Doxycycline     Duloxetine      Other reaction(s): suicidal ideation    Effexor [Venlafaxine]     Gluten Meal Other (See Comments)    Grapeseed Extract [Nutritional Supplements]      grapes    Hydrochlorothiazide     Iodine     Iron     Levaquin [Levofloxacin In D5w]     Meclizine Swelling and Other (See Comments)    Milk-Related Compounds      Dairy    Other      strawberries    Pecan Nut (Diagnostic)     Prochlorperazine Other (See Comments)    Red Dye     Reglan [Metoclopramide]     Rocephin [Ceftriaxone]     Rosemary Oil     Sulfa Antibiotics     Tetracyclines & Related     Topamax [Topiramate]     Tramadol     Triptans     [place] [Macadamia Nut Oil]     Zithromax [Azithromycin]     Adhesive Tape Rash    Betamethasone Nausea And Vomiting       Family History   Problem Relation Age of Onset    Alzheimer's Disease Mother     Alzheimer's Disease Father     Stroke Sister     Osteoporosis Sister     Hypertension Brother     Alzheimer's Disease Paternal Aunt     Alzheimer's Disease Paternal Uncle        Social History     Socioeconomic History    Marital status:      Spouse name: Not on file    Number of children: Not on file    Years of education: Not on file    Highest education level: Not on file   Occupational History    Not on file   Social Needs    Financial resource strain: Not on file    Food insecurity     Worry: Not on file     Inability: Not on file   [employer] needs     Medical: Not on file     Non-medical: Not on file   Tobacco Use    Smoking status: Never Smoker    Smokeless tobacco: Never Used   Substance and Sexual Activity    Alcohol use: No    Drug use: No    Sexual activity: Yes   Lifestyle    Physical activity     Days per week: Not on file     Minutes per session: Not on file    Stress: Not on file   Relationships    Social connections     Talks on phone: Not on file     Gets together: Not on file     Attends Anabaptism service: Not on file     Active member of club or organization: Not on file     Attends meetings of clubs or organizations: Not on file     Relationship status: Not on file    Intimate partner violence     Fear of current or ex partner: Not on file     Emotionally abused: Not on file     Physically abused: Not on file     Forced sexual activity: Not on file   Other Topics Concern    Not on file   Social History Narrative    Not on file       ROS: Review of Systems - History obtained from the patient  General ROS: negative  Psychological ROS: negative  Ophthalmic ROS: negative  Allergy and Immunology ROS: negative  Hematological and Lymphatic ROS: negative  Endocrine ROS: negative  Breast ROS: negative  Respiratory ROS: negative  Cardiovascular ROS: negative  Gastrointestinal ROS: positive for - abdominal pain and gas/bloating  Genito-Urinary ROS: negative  Musculoskeletal ROS: negative        Physical Exam:  Vitals:    10/16/20 0945   BP: (!) 140/95   Pulse: 100   Resp: 16   Temp: 97.1 °F (36.2 °C)   SpO2: 100%       PSYCH: mood and affect normal, alert and oriented x 3  CONSTITUTIONAL: No apparent distress, comfortable  EYES: Sclera white, pupils equal round and reactive to light  ENMT:  Hearing normal, trachea midline, ears externally intact  LYMPH: no lympadenopathy in neck. No lympadenopathy in groins  RESP: Breath sounds were clear and equal with no rales, wheezes, or rhonchi. Respiratory effort was normal with no retractions or use of accessory muscles. CV: Heart sounds were normal with a regular rate and rhythm. No pedal edema  GI/ Abdomen: The abdomen was soft and non distended. There was no tenderness, guarding, rebound, or rigidity. There was no                     masses, hepatosplenomegaly, or hernias. No inguinal hernias were noted on coughing and straining. 1.5 cm palpable mass on left side of abdomen in the subcutaneous space. Nondraining. Tender on palpation  Rectal -deferred  MSK: no clubbing/ no cyanosis/ gait normal       Assessment/Plan:  1.5 cm palpable mass in subcutaneous space along left-explained the patient at this is likely benign. This is likely a cyst.  At this point since it is only painful on deep palpation I would do nothing continue to observe for now. Can take it out if she becomes symptomatic the mass grows. Large tubular structure 4.6 cm in right adnexa -patient needs to follow-up with her OB/GYN. I printed out her reports from her pelvic ultrasounds from yesterday and gave them to her. Chronic constipation--pt is on scheduled miralax 1 tbsp bid and 3 senna. She follows up GI at [facility] for years--Colonoscopy scheduled with GI--Dr Epifanio Serna at [facility] on Nov 10     Follow-up as needed    Temitope Downey MD, FACS  10/16/2020  10:22 AM     NOTE: This report was transcribed using voice recognition software.  Every effort was made to ensure accuracy; however, inadvertent computerized transcription errors may be present.

## 2020-10-18 ENCOUNTER — HOSPITAL ENCOUNTER (EMERGENCY)
Age: 45
Discharge: HOME | End: 2020-10-19
Attending: EMERGENCY MEDICINE
Payer: MEDICAID

## 2020-10-18 LAB
ALBUMIN SERPL-MCNC: 4.2 G/DL (ref 3.5–5.2)
ALP BLD-CCNC: 57 U/L (ref 35–104)
ALT SERPL-CCNC: 24 U/L (ref 0–32)
ANION GAP SERPL CALCULATED.3IONS-SCNC: 11 MMOL/L (ref 7–16)
AST SERPL-CCNC: 27 U/L (ref 0–31)
BILIRUB SERPL-MCNC: 0.5 MG/DL (ref 0–1.2)
BUN BLDV-MCNC: 12 MG/DL (ref 6–20)
CALCIUM SERPL-MCNC: 9.5 MG/DL (ref 8.6–10.2)
CHLORIDE BLD-SCNC: 102 MMOL/L (ref 98–107)
CO2: 29 MMOL/L (ref 22–29)
CREAT SERPL-MCNC: 0.8 MG/DL (ref 0.5–1)
GFR AFRICAN AMERICAN: >60
GFR NON-AFRICAN AMERICAN: >60 ML/MIN/1.73
GLUCOSE BLD-MCNC: 183 MG/DL (ref 74–99)
LIPASE: 47 U/L (ref 13–60)
POTASSIUM REFLEX MAGNESIUM: 4.4 MMOL/L (ref 3.5–5)
SODIUM BLD-SCNC: 142 MMOL/L (ref 132–146)
TOTAL PROTEIN: 6.3 G/DL (ref 6.4–8.3)

## 2020-10-18 PROCEDURE — 36415 COLL VENOUS BLD VENIPUNCTURE: CPT

## 2020-10-18 PROCEDURE — 96374 THER/PROPH/DIAG INJ IV PUSH: CPT

## 2020-10-18 PROCEDURE — 6360000002 HC RX W HCPCS: Performed by: EMERGENCY MEDICINE

## 2020-10-18 PROCEDURE — 83690 ASSAY OF LIPASE: CPT

## 2020-10-18 PROCEDURE — 87088 URINE BACTERIA CULTURE: CPT

## 2020-10-18 PROCEDURE — 85025 COMPLETE CBC W/AUTO DIFF WBC: CPT

## 2020-10-18 PROCEDURE — 99284 EMERGENCY DEPT VISIT MOD MDM: CPT

## 2020-10-18 PROCEDURE — 2580000003 HC RX 258: Performed by: EMERGENCY MEDICINE

## 2020-10-18 PROCEDURE — 81001 URINALYSIS AUTO W/SCOPE: CPT

## 2020-10-18 PROCEDURE — 99283 EMERGENCY DEPT VISIT LOW MDM: CPT

## 2020-10-18 PROCEDURE — 80053 COMPREHEN METABOLIC PANEL: CPT

## 2020-10-18 RX ORDER — 0.9 % SODIUM CHLORIDE 0.9 %
1000 INTRAVENOUS SOLUTION INTRAVENOUS ONCE
Status: COMPLETED | OUTPATIENT
Start: 2020-10-18 | End: 2020-10-19

## 2020-10-18 RX ORDER — DROPERIDOL 2.5 MG/ML
0.62 INJECTION, SOLUTION INTRAMUSCULAR; INTRAVENOUS ONCE
Status: COMPLETED | OUTPATIENT
Start: 2020-10-18 | End: 2020-10-18

## 2020-10-18 RX ADMIN — DROPERIDOL 0.62 MG: 2.5 INJECTION, SOLUTION INTRAMUSCULAR; INTRAVENOUS at 23:31

## 2020-10-18 RX ADMIN — SODIUM CHLORIDE 1000 ML: 9 INJECTION, SOLUTION INTRAVENOUS at 23:30

## 2020-10-19 ENCOUNTER — APPOINTMENT (OUTPATIENT)
Dept: CT IMAGING | Age: 45
End: 2020-10-19
Payer: MEDICAID

## 2020-10-19 ENCOUNTER — OFFICE VISIT (OUTPATIENT)
Dept: NEUROLOGY | Age: 45
End: 2020-10-19
Payer: MEDICAID

## 2020-10-19 VITALS
OXYGEN SATURATION: 97 % | DIASTOLIC BLOOD PRESSURE: 67 MMHG | SYSTOLIC BLOOD PRESSURE: 115 MMHG | HEART RATE: 87 BPM | TEMPERATURE: 97.8 F | WEIGHT: 198.6 LBS | RESPIRATION RATE: 12 BRPM | BODY MASS INDEX: 36.55 KG/M2 | HEIGHT: 62 IN

## 2020-10-19 VITALS
DIASTOLIC BLOOD PRESSURE: 80 MMHG | HEART RATE: 79 BPM | BODY MASS INDEX: 36.44 KG/M2 | RESPIRATION RATE: 15 BRPM | WEIGHT: 198 LBS | SYSTOLIC BLOOD PRESSURE: 112 MMHG | HEIGHT: 62 IN | OXYGEN SATURATION: 96 % | TEMPERATURE: 97.7 F

## 2020-10-19 LAB
BACTERIA: ABNORMAL /HPF
BASOPHILS ABSOLUTE: 0.04 E9/L (ref 0–0.2)
BASOPHILS RELATIVE PERCENT: 0.6 % (ref 0–2)
BILIRUBIN URINE: NEGATIVE
BLOOD, URINE: NEGATIVE
CLARITY: CLEAR
COLOR: YELLOW
EOSINOPHILS ABSOLUTE: 0.13 E9/L (ref 0.05–0.5)
EOSINOPHILS RELATIVE PERCENT: 2 % (ref 0–6)
EPITHELIAL CELLS, UA: ABNORMAL /HPF
GLUCOSE URINE: NEGATIVE MG/DL
HCT VFR BLD CALC: 39.2 % (ref 34–48)
HEMOGLOBIN: 12.6 G/DL (ref 11.5–15.5)
IMMATURE GRANULOCYTES #: 0.02 E9/L
IMMATURE GRANULOCYTES %: 0.3 % (ref 0–5)
KETONES, URINE: NEGATIVE MG/DL
LEUKOCYTE ESTERASE, URINE: ABNORMAL
LYMPHOCYTES ABSOLUTE: 2.26 E9/L (ref 1.5–4)
LYMPHOCYTES RELATIVE PERCENT: 35.5 % (ref 20–42)
MCH RBC QN AUTO: 30.2 PG (ref 26–35)
MCHC RBC AUTO-ENTMCNC: 32.1 % (ref 32–34.5)
MCV RBC AUTO: 94 FL (ref 80–99.9)
MONOCYTES ABSOLUTE: 0.47 E9/L (ref 0.1–0.95)
MONOCYTES RELATIVE PERCENT: 7.4 % (ref 2–12)
NEUTROPHILS ABSOLUTE: 3.44 E9/L (ref 1.8–7.3)
NEUTROPHILS RELATIVE PERCENT: 54.2 % (ref 43–80)
NITRITE, URINE: NEGATIVE
PDW BLD-RTO: 13.2 FL (ref 11.5–15)
PH UA: 7 (ref 5–9)
PLATELET # BLD: 249 E9/L (ref 130–450)
PMV BLD AUTO: 12 FL (ref 7–12)
PROTEIN UA: NEGATIVE MG/DL
RBC # BLD: 4.17 E12/L (ref 3.5–5.5)
RBC UA: ABNORMAL /HPF (ref 0–2)
SPECIFIC GRAVITY UA: 1.01 (ref 1–1.03)
UROBILINOGEN, URINE: 0.2 E.U./DL
WBC # BLD: 6.4 E9/L (ref 4.5–11.5)
WBC UA: ABNORMAL /HPF (ref 0–5)

## 2020-10-19 PROCEDURE — 99215 OFFICE O/P EST HI 40 MIN: CPT | Performed by: NURSE PRACTITIONER

## 2020-10-19 PROCEDURE — G8427 DOCREV CUR MEDS BY ELIG CLIN: HCPCS | Performed by: NURSE PRACTITIONER

## 2020-10-19 PROCEDURE — 1036F TOBACCO NON-USER: CPT | Performed by: NURSE PRACTITIONER

## 2020-10-19 PROCEDURE — G8417 CALC BMI ABV UP PARAM F/U: HCPCS | Performed by: NURSE PRACTITIONER

## 2020-10-19 PROCEDURE — G8482 FLU IMMUNIZE ORDER/ADMIN: HCPCS | Performed by: NURSE PRACTITIONER

## 2020-10-19 PROCEDURE — 74177 CT ABD & PELVIS W/CONTRAST: CPT

## 2020-10-19 PROCEDURE — 6360000004 HC RX CONTRAST MEDICATION: Performed by: RADIOLOGY

## 2020-10-19 RX ORDER — ERGOCALCIFEROL 1.25 MG/1
50000 CAPSULE ORAL WEEKLY
COMMUNITY
End: 2020-11-12

## 2020-10-19 RX ORDER — THIAMINE HCL 100 MG
1 TABLET ORAL 2 TIMES DAILY
COMMUNITY

## 2020-10-19 RX ORDER — KETOROLAC TROMETHAMINE 10 MG/1
10 TABLET, FILM COATED ORAL EVERY 6 HOURS PRN
Qty: 20 TABLET | Refills: 0 | Status: ON HOLD | OUTPATIENT
Start: 2020-10-19 | End: 2021-07-30 | Stop reason: HOSPADM

## 2020-10-19 RX ORDER — INSULIN LISPRO 100 [IU]/ML
INJECTION, SOLUTION INTRAVENOUS; SUBCUTANEOUS DAILY
COMMUNITY

## 2020-10-19 RX ORDER — ACETAMINOPHEN 160 MG
TABLET,DISINTEGRATING ORAL NIGHTLY
COMMUNITY
End: 2020-10-20 | Stop reason: ALTCHOICE

## 2020-10-19 RX ORDER — INSULIN GLARGINE 100 [IU]/ML
2 INJECTION, SOLUTION SUBCUTANEOUS
COMMUNITY
End: 2020-11-12

## 2020-10-19 RX ADMIN — IOPAMIDOL 90 ML: 755 INJECTION, SOLUTION INTRAVENOUS at 00:22

## 2020-10-19 NOTE — ED NOTES
Pt alert and oriented x4. Speech clear. Respirations easy/unlabored. Skin warm/dry. Appropriate color. No signs of acute distress noted. Pt/family teaching provided; verbalized understanding. Pt stable for discharge.      Taylor Driver RN  10/19/20 5125

## 2020-10-19 NOTE — PROGRESS NOTES
[address] [facility]. Albert Tyler M.D., F.A.C.P. Nancie Baeza, ANDREZ, APRN, CNS  Jeanne Trejo. Erin Velez, MSN, APRN-FNP-C  Jaxson Prieto MSN, APRN, FNP-C  Romain CONNELL, CHAPIN Sanchez MSN, APRN, FNP-C  [address] [facility] [address], [id number] [place] [address] Rd  Phone: [phone]  Fax: [phone]       Sebas Boogie is a 45 y.o. right handed female     Patient presents with her --- patient is a decent historian;  added little to no additional history    Patient presents to neurology office today for follow-up to recent hospital admission and headache management. Patient was evaluated by Dr. Ambrose Gracia on August 27, 2020 for TIA versus complex migraine. Patient initially presented to [facility] ED with strokelike symptoms described as right-sided numbness/tingling and weakness with headache and was transferred to [facility] for further evaluation and treatment. Initial NIHSS was 6. Shortly after arrival to [facility] patient's symptoms resolved. tPA was not given and MRI brain was normal.  On chart review, patient had strength 5/5 throughout all 4 extremities with right elvira-sensory loss on exam.  Patient was thought to have complex migraine versus TIA and was discharged to follow-up with neurology. Patient was started on aspirin during admission. Patient has history of complex migraine in the past.  Complex migraines are described as unilateral pounding and throbbing pain from frontal to occipital accompanied with left-sided weakness accompanied with nausea, photo and phonophobia with irritability. Patient also describes heaviness with numbness and tingling to the left side of her body. On the day of admission patient had right facial droop, confusion described as disoriented, double vision and expressive aphasia.   Patient said this has never happened with her other complex migraines in the past.  Patient was formally follow by Dr. Juancarlos Ruiz in [facility] last seeing her a few months ago. In the past patient has tried amitriptyline, multiple blood pressure medications, triptans however that was stopped due to Raynauds disease, magnesium which was thought to be helpful, Botox and Emgality. Patient says Emgality was helping until her most recent IVIG treatment. Patient remains on propanolol. Patient informs me she was being evaluated for possible MS--- brings them packet of previous testing which included NMO less than 1.5 and oligoclonal bands of 4 in her CSF. Patient has had migraines since childhood. Patient's mother and sisters both have migraines as well. Patient sister also has had strokes and TIAs in the past.      Patient has multiple chronic conditions which include connective tissue disorder, scleroderma, immune deficiency disorder on IVIG, fibromyalgia, pseudotumor cerebri status post LP; allergic to Diamox that caused rash and shortness of breath. 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 Patient is scheduled for barium swallow and EGD next week in [place]. Patient is also followed by Dr. Mayra Marc, autoimmunologist/allergist at [facility] who treats her immune deficiency disorder. Patient also mentions that this doctor is moving to [facility] and this is too far for her to drive so she is unsure how she will get further treatment. Since her last visit patient's headaches have improved. Patient has identified triggers which include weather changes, heightened smells and floral scents which she tries to avoid. Patient kept a headache diary however forgot to bring it in. Patient also says that she start physical therapy twice a week; starting at the end of September which has helped with her weakness. Patient is unsure when PT will be completed.   Patient is set to see her neuro non-tender; bowel sounds normal;   Extremities: normal, atraumatic, no cyanosis or edema  Pulses: 2+ and symmetric  Skin:  color, texture, turgor normal--no rashes or lesions      Mental Status: alert and oriented x 4, very conversant    Appropriate attention/concentration  Intact fundus of knowledge  Memories intact    Speech: no dysarthria  Language: no aphasias---reading, writing, repetition, and object identification intact    Cranial Nerves:  I: smell  intact   II: visual acuity     II: visual fields  no blink to threat; blind bilaterally worse to left eye; left red desaturation   II: pupils PERRL   III,VII: ptosis None   III,IV,VI: extraocular muscles  EOMI without nystagmus   V: mastication Normal   V: facial light touch sensation  Normal   V,VII: corneal reflex     VII: facial muscle function - upper  Normal   VII: facial muscle function - lower Normal   VIII: hearing Normal   IX: soft palate elevation  Normal   IX,X: gag reflex    XI: trapezius strength  5/5   XI: sternocleidomastoid strength 5/5   XI: neck extension strength  5/5   XII: tongue strength  Normal     Motor:  5/5 left upper and lower extremity  +3/5 right upper and lower extremity  Normal bulk and tone  Right pronator drift  No abnormal movements    Sensory:  LT and PP decreased to right when compared to left  Vibration decreased to right when compared to left    Coordination:   FN slower to right; normal to left  FFM and MEGAN normal  HS normal    Gait:  Right hemiparetic gait with cane    DTR:   Right Brachioradialis reflex 1+  Left Brachioradialis reflex 1+  Right Biceps reflex 1+  Left Biceps reflex 1+  Right Triceps reflex 1+  Left Triceps reflex 1+  Right Quadriceps reflex 1+  Left Quadriceps reflex 1+  Right Achilles reflex 1+  Left Achilles reflex 1+    No Silvestre's    No other pathological reflexes    Laboratory/Radiology:  ry/Radiology:     CBC:   Lab Results   Component Value Date    WBC 6.4 10/18/2020    RBC 4.17 10/18/2020    RBC 4.31 03/11/2020    HGB 12.6 10/18/2020    HCT 39.2 10/18/2020    MCV 94.0 10/18/2020    MCH 30.2 10/18/2020    MCHC 32.1 10/18/2020    RDW 13.2 10/18/2020     10/18/2020    MPV 12.0 10/18/2020     CMP:    Lab Results   Component Value Date     10/18/2020    K 4.4 10/18/2020     10/18/2020    CO2 29 10/18/2020    BUN 12 10/18/2020    CREATININE 0.8 10/18/2020    GFRAA >60 10/18/2020    AGRATIO 1.4 09/18/2020    LABGLOM >60 10/18/2020    GLUCOSE 183 10/18/2020    PROT 6.3 10/18/2020    LABALBU 4.2 10/18/2020    CALCIUM 9.5 10/18/2020    BILITOT 0.5 10/18/2020    ALKPHOS 57 10/18/2020    AST 27 10/18/2020    ALT 24 10/18/2020     Hepatic Function Panel:    Lab Results   Component Value Date    ALKPHOS 57 10/18/2020    ALT 24 10/18/2020    AST 27 10/18/2020    PROT 6.3 10/18/2020    BILITOT 0.5 10/18/2020    LABALBU 4.2 10/18/2020     HgBA1c:    Lab Results   Component Value Date    LABA1C 7.0 08/28/2020     FLP:    Lab Results   Component Value Date    TRIG 116 08/28/2020    HDL 43 08/28/2020    LDLCALC 64 08/28/2020    LABVLDL 23 08/28/2020     CT head 9/19/2020: No acute intracranial process. MRI brain with contrast 9/16/2020: MRI of the brain IV contrast only, demonstrates no disruption of blood/brain barrier or abnormal intracranial enhancements. MRI cervical spine with contrast 9/16/2020: Unremarkable MRA of the cervical spine IV contrast only. No indication of disruption of blood dilatation brain barrier in the cervical spine cord, upper thoracic spine cord, or abnormal enhancement in the cord substance. MRI brain without contrast 8/27/2020:   No acute findings. Essentially unremarkable study. CTA head and neck with contrast 8/27/2020:   1. No significant arterial inflow disease in the neck. 2.  Intracranially, no evidence of large vessel occlusion, aneurysm or vascular malformation. Suspected mild hypoplasia of the right P1 segment.     MRI cervical spine without contrast 8/9/2020:   Single level disc disease at C5-6 intervally progressed from 2006. CT head without contrast 3/11/2020: No acute brain process identified. All labs and images were personally reviewed at the time of this visit    Assessment:     Complex migraine with hemiplegia versus TIA during hospital admission in August 2020   Right facial droop, disorientation, double vision and expressive aphasia reported--- NIHSS 6    Resolve spontaneously after arrival to ED; no tPA given   MRI negative for acute pathology   CTA head and neck was also unremarkable for LVO or vascular malformation   Patient started on aspirin; not started on statin   A1c 7.0, LDL 64   Currently on Zanaflex, magnesium, propanolol, Depakote and Emgality    Does not need additional therapy at this time--- med list should be simplified if able   Has failed multiple other therapies in the past---please see above   Patient has kept a headache diary however failed to bring it in--- has been able to identify triggers   Patient continues physical therapy twice a week which has helped with weakness   Patient asked about breakthrough headache medication today----I will not add additional therapies at this time this was reiterated; patient's clinical presentation is most likely being complicated by her multiple medications.      Reported history of pseudotumor cerebri, asymptomatic meningitis, immune deficiency disorder, dysphasia   Also reportedly being worked up for MS--- MRIs without contrast unremarkable   MRI of brain and cervical spine with contrast were unremarkable for acute process   Blind bilaterally worse to left eye--- can see shadow in right eye   Recent LP positive for oligoclonal banding; NMO <1.5, WBC 4 but elevated protein and glucose   Patient is on IVIG last received on August 13;     IVIG times the past 2 years approximately every 2 weeks--- managed by [facility] on immunologist   Patient goes to see neuro-ophthalmologist soon    Plan: Will keep headache diary  Continue Emgality and magnesium  Continue low-dose statin  Continue aspirin  Call with any issues  Return office in 3 months      Monse 207, APRN NP-C  9:32 AM  10/19/2020    I spent 40 minutes with this patient obtaining the HPI and discussing the exam with greater than 50% of the time providing counseling and education on medications and other treatment plans. All questions were answered prior to leaving my office.

## 2020-10-19 NOTE — ED PROVIDER NOTES
HPI:  10/18/20,   Time: 11:20 PM EDT       Sridevi Henry is a 45 y.o. female presenting to the ED for abdominal pain, beginning 1 week ago. The complaint has been persistent, moderate in severity, and worsened by nothing. Patient states that over the last week she has had increasing pain in her lower abdomen. She states it is sharp and stabbing. She states that when she stands up she feels a lot of pressure in her pelvic region. She states she has been seen several times and has been diagnosed with hydrosalpinx as well as a subcutaneous cystic mass for which she is being followed by general surgery for. She states that today she began having nausea vomiting therefore she came to the ED to be evaluated. Denies any bilious or bloody vomiting. Denies any fevers or chills. No urinary symptoms. Patient continues to have bowel movements. Otherwise no other acute symptoms. Review of Systems:   Pertinent positives and negatives are stated within HPI, all other systems reviewed and are negative.          --------------------------------------------- PAST HISTORY ---------------------------------------------  Past Medical History:  has a past medical history of Anemia, Anxiety, Asthma, Blind, Connective tissue disease ([address] Cyst of right breast, DDD (degenerative disc disease), cervical, Depression, Diabetes mellitus ([address] Dysphagia, Fibromyalgia, Gastroparesis, GERD (gastroesophageal reflux disease), Headache, Hematuria, Hyperlipidemia, Hypertension, Hypothyroidism, IBS (irritable bowel syndrome), Immune deficiency disorder ([address] Insomnia, Kidney stones, Meningitis, PAULINE on CPAP, PCOS (polycystic ovarian syndrome), POTS (postural orthostatic tachycardia syndrome), Pseudotumor cerebri, Raynaud's disease, Rectal bleeding, RLS (restless legs syndrome), Scleroderma ([address] Sjogren's disease ([address] Thyroid nodule, and TIA (transient ischemic attack).     Past Surgical History:  has a past surgical history that includes Hysterectomy; Cholecystectomy; Appendectomy; Carpal tunnel release (Bilateral); Port Surgery; and Pyloroplasty. Social History:  reports that she has never smoked. She has never used smokeless tobacco. She reports that she does not drink alcohol or use drugs. Family History: family history includes Alzheimer's Disease in her father, mother, paternal aunt, and paternal uncle; Hypertension in her brother; Osteoporosis in her sister; Stroke in her sister. The patients home medications have been reviewed. Allergies: Diamox [acetazolamide]; Abilify [aripiprazole]; Aspartame and phenylalanine; Cymbalta [duloxetine hcl]; Darvon [propoxyphene]; Dilaudid [hydromorphone hcl]; Doxepin; Doxycycline; Duloxetine; Effexor [venlafaxine]; Gluten meal; Grapeseed extract [nutritional supplements]; Hydrochlorothiazide; Iodine; Iron; Levaquin [levofloxacin in d5w]; Meclizine; Milk-related compounds; Other; Pecan nut (diagnostic); Prochlorperazine; Red dye; Reglan [metoclopramide]; Rocephin [ceftriaxone]; Rosemary oil; Sulfa antibiotics; Tetracyclines & related; Topamax [topiramate]; Tramadol; Triptans; [place] nut oil]; Zithromax [azithromycin]; Adhesive tape; and Betamethasone        ---------------------------------------------------PHYSICAL EXAM--------------------------------------    Constitutional/General: Alert and oriented x3, well appearing, non toxic in NAD  Head: Normocephalic and atraumatic  Eyes: PERRL, EOMI, conjunctive normal, sclera non icteric  Mouth: Oropharynx clear, handling secretions, no trismus, no asymmetry of the posterior oropharynx or uvular edema  Neck: Supple, full ROM, non tender to palpation in the midline, no stridor, no crepitus, no meningeal signs  Respiratory: Lungs clear to auscultation bilaterally, no wheezes, rales, or rhonchi. Not in respiratory distress  Cardiovascular:  Regular rate. Regular rhythm. No murmurs, gallops, or rubs.  2+ distal pulses  GI:  Abdomen Soft, tenderness palpation in the right lower quadrant, Non distended. +BS. No organomegaly, no palpable masses,  No rebound, guarding, or rigidity. Musculoskeletal: Moves all extremities x 4. Warm and well perfused, no clubbing, cyanosis, or edema. Capillary refill <3 seconds  Integument: skin warm and dry. No rashes. Neurologic: GCS 15, no focal deficits  Psychiatric: Normal Affect    -------------------------------------------------- RESULTS -------------------------------------------------  I have personally reviewed all laboratory and imaging results for this patient. Results are listed below.      LABS:  Results for orders placed or performed during the hospital encounter of 10/18/20   CBC Auto Differential   Result Value Ref Range    WBC 6.4 4.5 - 11.5 E9/L    RBC 4.17 3.50 - 5.50 E12/L    Hemoglobin 12.6 11.5 - 15.5 g/dL    Hematocrit 39.2 34.0 - 48.0 %    MCV 94.0 80.0 - 99.9 fL    MCH 30.2 26.0 - 35.0 pg    MCHC 32.1 32.0 - 34.5 %    RDW 13.2 11.5 - 15.0 fL    Platelets 269 064 - 069 E9/L    MPV 12.0 7.0 - 12.0 fL    Neutrophils % 54.2 43.0 - 80.0 %    Immature Granulocytes % 0.3 0.0 - 5.0 %    Lymphocytes % 35.5 20.0 - 42.0 %    Monocytes % 7.4 2.0 - 12.0 %    Eosinophils % 2.0 0.0 - 6.0 %    Basophils % 0.6 0.0 - 2.0 %    Neutrophils Absolute 3.44 1.80 - 7.30 E9/L    Immature Granulocytes # 0.02 E9/L    Lymphocytes Absolute 2.26 1.50 - 4.00 E9/L    Monocytes Absolute 0.47 0.10 - 0.95 E9/L    Eosinophils Absolute 0.13 0.05 - 0.50 E9/L    Basophils Absolute 0.04 0.00 - 0.20 E9/L   Comprehensive Metabolic Panel w/ Reflex to MG   Result Value Ref Range    Sodium 142 132 - 146 mmol/L    Potassium reflex Magnesium 4.4 3.5 - 5.0 mmol/L    Chloride 102 98 - 107 mmol/L    CO2 29 22 - 29 mmol/L    Anion Gap 11 7 - 16 mmol/L    Glucose 183 (H) 74 - 99 mg/dL    BUN 12 6 - 20 mg/dL    CREATININE 0.8 0.5 - 1.0 mg/dL    GFR Non-African American >60 >=60 mL/min/1.73    GFR African American >60     Calcium 9.5 8.6 - 10.2 mg/dL    Total Protein 6.3 (L) 6.4 - 8.3 g/dL    Alb 4.2 3.5 - 5.2 g/dL    Total Bilirubin 0.5 0.0 - 1.2 mg/dL    Alkaline Phosphatase 57 35 - 104 U/L    ALT 24 0 - 32 U/L    AST 27 0 - 31 U/L   Lipase   Result Value Ref Range    Lipase 47 13 - 60 U/L   Urinalysis, reflex to microscopic   Result Value Ref Range    Color, UA Yellow Straw/Yellow    Clarity, UA Clear Clear    Glucose, Ur Negative Negative mg/dL    Bilirubin Urine Negative Negative    Ketones, Urine Negative Negative mg/dL    Specific Gravity, UA 1.010 1.005 - 1.030    Blood, Urine Negative Negative    pH, UA 7.0 5.0 - 9.0    Protein, UA Negative Negative mg/dL    Urobilinogen, Urine 0.2 <2.0 E.U./dL    Nitrite, Urine Negative Negative    Leukocyte Esterase, Urine TRACE (A) Negative   Microscopic Urinalysis   Result Value Ref Range    WBC, UA 0-1 0 - 5 /HPF    RBC, UA NONE 0 - 2 /HPF    Epithelial Cells, UA RARE /HPF    Bacteria, UA RARE (A) None Seen /HPF       RADIOLOGY:  Interpreted by Radiologist.  CT ABDOMEN PELVIS W IV CONTRAST Additional Contrast? None   Final Result   There is a 3.9 cm cystic lesion in the right adnexa. Status post hysterectomy and cholecystectomy. Otherwise no CT evidence of an acute pathologic process in the abdomen or   pelvis.               ------------------------- NURSING NOTES AND VITALS REVIEWED ---------------------------   The nursing notes within the ED encounter and vital signs as below have been reviewed by myself. /80   Pulse 79   Temp 97.7 °F (36.5 °C)   Resp 15   Ht 5' 2\" (1.575 m)   Wt 198 lb (89.8 kg)   SpO2 96%   BMI 36.21 kg/m²   Oxygen Saturation Interpretation: Normal    The patients available past medical records and past encounters were reviewed.         ------------------------------ ED COURSE/MEDICAL DECISION MAKING----------------------  Medications   0.9 % sodium chloride bolus (0 mLs Intravenous Stopped 10/19/20 0031)   droperidol (INAPSINE) injection 0.625 mg (0.625 mg Intravenous Given 10/18/20 2331)   iopamidol (ISOVUE-370) 76 % injection 90 mL (90 mLs Intravenous Given 10/19/20 0022)         ED COURSE:       Medical Decision Making: This is a 46-year-old female who presented to the ED for abdominal pain. Patient underwent laboratory work-up which showed a normal CBC chemistry except for a glucose of 183. Normal lipase. Normal urinalysis with no signs of infection. CT abdomen pelvis redemonstrated 3.9 cm cystic lesion in the right adnexa. This is unchanged from previous. Reviewed patient's recent ultrasound which showed no torsion and this was redemonstrated. Patient given IV fluids and droperidol with complete resolution of her symptoms. Tolerating p.o. at this time. Patient has already been seen by surgery and is going to be following up with OB/GYN. Therefore the patient be treated conservatively with pain control. She will follow-up with her OB/GYN. Return precautions given. Patient agrees with plan. I, Dr. Lupe Sol, am the primary provider for this encounter    This patient's ED course included: a personal history and physicial examination, re-evaluation prior to disposition and multiple bedside re-evaluations    This patient has remained hemodynamically stable during their ED course. Re-Evaluations:             Re-evaluation. Patients symptoms are improving      Counseling: The emergency provider has spoken with the patient and discussed todays results, in addition to providing specific details for the plan of care and counseling regarding the diagnosis and prognosis. Questions are answered at this time and they are agreeable with the plan.       --------------------------------- IMPRESSION AND DISPOSITION ---------------------------------    IMPRESSION  1. Generalized abdominal pain    2.  Cyst of right ovary        DISPOSITION  Disposition: Discharge to home  Patient condition is stable    NOTE: This report was transcribed using voice recognition software.  Every effort was made to ensure accuracy; however, inadvertent computerized transcription errors may be present        Jodi Dove DO  10/19/20 0216

## 2020-10-19 NOTE — ED NOTES
Radiology Procedure Waiver   Name: Vanessa Gross  : 1975  MRN: [id number]    Date:  10/18/20    Time: 11:31 PM EDT    Benefits of immediately proceeding with Radiology exam(s) without pre-testing outweigh the risks or are not indicated as specified below and therefore the following is/are being waived:    [] Pregnancy test   [] Patients LMP on-time and regular.   [] Patient had Tubal Ligation or has other Contraception Device. [] Patient  is Menopausal or Premenarcheal.    [] Patient had Full or Partial Hysterectomy. [x] Protocol for Iodine allergy    [] MRI Questionnaire     [] BUN/Creatinine   [] Patient age w/no hx of renal dysfunction. [] Patient on Dialysis. [] Recent Normal Labs.   Electronically signed by Poly Cooper DO on 10/18/20 at 11:31 PM EDT          And has received IV contrast in the past with no reaction     Poyl Cooper DO  10/18/20 1733

## 2020-10-20 LAB — URINE CULTURE, ROUTINE: NORMAL

## 2020-10-22 ENCOUNTER — TELEPHONE (OUTPATIENT)
Dept: PRIMARY CARE CLINIC | Age: 45
End: 2020-10-22

## 2020-10-22 NOTE — TELEPHONE ENCOUNTER
[facility] called stating that [address] order must state on it whether it will be transvaginal, transabdominal or both. It has to state this due to insurance reasons. A new order can be faxed to [phone] so that this US can be scheduled.

## 2020-10-22 NOTE — TELEPHONE ENCOUNTER
Call patient. I thought she had this done already at [facility] radiology department in the past week.   If it was done there they will not repeat this at [facility]

## 2020-10-26 ENCOUNTER — TELEPHONE (OUTPATIENT)
Dept: NEUROLOGY | Age: 45
End: 2020-10-26

## 2020-10-26 NOTE — TELEPHONE ENCOUNTER
LM to call to set up Jan 2021 sa[facility]ara/[facility]ard in [place]  Electronically signed by Grace Johnson on 10/26/20 at 1:27 PM EDT

## 2020-11-11 ENCOUNTER — VIRTUAL VISIT (OUTPATIENT)
Dept: PRIMARY CARE CLINIC | Age: 45
End: 2020-11-11
Payer: MEDICAID

## 2020-11-11 PROCEDURE — 99213 OFFICE O/P EST LOW 20 MIN: CPT | Performed by: INTERNAL MEDICINE

## 2020-11-12 ENCOUNTER — TELEPHONE (OUTPATIENT)
Dept: PRIMARY CARE CLINIC | Age: 45
End: 2020-11-12

## 2020-11-12 PROBLEM — H46.9 OPTIC NEURITIS: Status: ACTIVE | Noted: 2020-11-06

## 2020-11-12 PROBLEM — N39.0 ACUTE URINARY TRACT INFECTION: Status: ACTIVE | Noted: 2020-11-12

## 2020-11-12 PROBLEM — J06.9 ACUTE UPPER RESPIRATORY INFECTION: Status: ACTIVE | Noted: 2020-11-12

## 2020-11-12 RX ORDER — INSULIN GLARGINE 100 [IU]/ML
INJECTION, SOLUTION SUBCUTANEOUS
COMMUNITY
Start: 2020-11-10

## 2020-11-12 ASSESSMENT — ENCOUNTER SYMPTOMS
COUGH: 1
COLOR CHANGE: 0
EYE DISCHARGE: 0
EYE PAIN: 1
PHOTOPHOBIA: 0
VOMITING: 0
SHORTNESS OF BREATH: 0
SORE THROAT: 0
TROUBLE SWALLOWING: 0
BLOOD IN STOOL: 0
FACIAL SWELLING: 0
NAUSEA: 0
CONSTIPATION: 0
WHEEZING: 0
ANAL BLEEDING: 0
STRIDOR: 0
DIARRHEA: 0
RHINORRHEA: 0
EYE ITCHING: 0
ABDOMINAL PAIN: 0

## 2020-11-12 NOTE — PROGRESS NOTES
Rachel Mars is a 45 y.o. female evaluated via telephone on 11/11/2020. Consent:  She and/or health care decision maker is aware that that she may receive a bill for this telephone service, depending on her insurance coverage, and has provided verbal consent to proceed: Yes      Documentation:  I communicated with the patient and/or health care decision maker about dyspnea and pleuritic pain   Details of this discussion including any medical advice provided: See note below    Patient was located in her [address] [place] [address] home address, PCP located in other [address] [place] [address] address. No one else was involved in this call      I affirm this is a Patient Initiated Episode with a Patient who has not had a related appointment within my department in the past 7 days or scheduled within the next 24 hours.     Patient identification was verified at the start of the visit: Yes    Total Time: minutes: 11-20 minutes    Note: not billable if this call serves to triage the patient into an appointment for the relevant [place]

## 2020-11-12 NOTE — PROGRESS NOTES
Constitutional: Negative for appetite change, fatigue and unexpected weight change. HENT: Positive for congestion. Negative for ear pain, facial swelling, rhinorrhea, sore throat, tinnitus and trouble swallowing. Eyes: Positive for pain. Negative for photophobia, discharge, itching and visual disturbance. Respiratory: Positive for cough. Negative for shortness of breath, wheezing and stridor. Cardiovascular: Positive for chest pain. Negative for palpitations and leg swelling. Gastrointestinal: Negative for abdominal pain, anal bleeding, blood in stool, constipation, diarrhea, nausea and vomiting. Endocrine: Negative for cold intolerance, heat intolerance, polydipsia, polyphagia and polyuria. Genitourinary: Negative for difficulty urinating, dysuria, flank pain, frequency, hematuria and urgency. Musculoskeletal: Negative for arthralgias, gait problem, joint swelling and myalgias. Skin: Negative for color change, pallor and rash. Allergic/Immunologic: Negative for environmental allergies and food allergies. Neurological: Negative for dizziness, tremors, seizures, syncope, speech difficulty, weakness, light-headedness, numbness and headaches. Hematological: Negative for adenopathy. Does not bruise/bleed easily. Psychiatric/Behavioral: Negative for agitation, behavioral problems, confusion, sleep disturbance and suicidal ideas. The patient is not nervous/anxious.            Current Outpatient Medications:     insulin glargine (LANTUS SOLOSTAR) 100 UNIT/ML injection pen, , Disp: , Rfl:     BENZONATATE PO, , Disp: , Rfl:     ketorolac (TORADOL) 10 MG tablet, Take 1 tablet by mouth every 6 hours as needed for Pain, Disp: 20 tablet, Rfl: 0    Magnesium 500 MG TABS, Take 1 tablet by mouth 2 times daily, Disp: , Rfl:     insulin lispro, 1 Unit Dial, (HUMALOG KWIKPEN) 100 UNIT/ML SOPN, Inject into the skin daily Sliding scale, Disp: , Rfl:     Probiotic Product (PROBIOTIC DAILY PO), , Disp: , Rfl:     polyethylene glycol (GLYCOLAX) 17 g packet, Take 17 g by mouth 2 times daily as needed for Constipation, Disp: 527 g, Rfl: 2    propranolol (INDERAL) 10 MG tablet, Take 1 tablet by mouth 3 times daily, Disp: 90 tablet, Rfl: 3    furosemide (LASIX) 20 MG tablet, Take 1 tablet by mouth daily, Disp: 60 tablet, Rfl: 3    aspirin 81 MG EC tablet, Take 81 mg by mouth daily, Disp: , Rfl:     atorvastatin (LIPITOR) 10 MG tablet, Take 1 tablet by mouth daily, Disp: 90 tablet, Rfl: 1    rOPINIRole (REQUIP) 0.5 MG tablet, Take 1 tablet by mouth See Admin Instructions 1 tablet - breakfast 2 tablets - supper (Patient taking differently: Take 0.5 mg by mouth See Admin Instructions 1 tablet qAM and 2 tablets qPM), Disp: 270 tablet, Rfl: 1    levothyroxine (SYNTHROID) 50 MCG tablet, Take 2.5 tablets by mouth Five times weekly Given Monday,Tuesday,Wednesday,Thursday,Friday, Disp: 90 tablet, Rfl: 1    levothyroxine (SYNTHROID) 50 MCG tablet, Take 2 tablets by mouth Twice a Week Given Saturday,Sunday, Disp: 24 tablet, Rfl: 1    pantoprazole (PROTONIX) 40 MG tablet, Take 1 tablet by mouth every morning (before breakfast), Disp: 90 tablet, Rfl: 1    folic acid (FOLVITE) 1 MG tablet, Take 1 tablet by mouth Daily with lunch, Disp: 90 tablet, Rfl: 1    acetaminophen (TYLENOL) 325 MG tablet, Take 650 mg by mouth every 6 hours as needed for Pain, Disp: , Rfl:     sodium chloride (OCEAN, BABY AYR) 0.65 % nasal spray, 1 spray by Nasal route 4 times daily as needed for Congestion, Disp: , Rfl:     azelastine (ASTELIN) 0.1 % nasal spray, 1 spray by Nasal route 2 times daily, Disp: , Rfl:     diphenhydrAMINE (BENYLIN) 12.5 MG/5ML liquid, Take 25 mg by mouth 4 times daily as needed for Allergies (migrane break through), Disp: , Rfl:     Artificial Saliva (BIOTENE DRY MOUTH MOISTURIZING MT), Take 1 spray by mouth every 3 hours, Disp: , Rfl:     clotrimazole (LOTRIMIN) 1 % cream, Apply topically 2 times daily as needed Under 8 MG TBDP disintegrating tablet, Take 8 mg by mouth every 8 hours as needed for Nausea or Vomiting , Disp: , Rfl:      Allergies   Allergen Reactions    Diamox [Acetazolamide] Shortness Of Breath and Rash    Abilify [Aripiprazole]      Fevers and Tremors    Aspartame And Phenylalanine Diarrhea     Blood sugar Les, Diarrhea    Cymbalta [Duloxetine Hcl]     Darvon [Propoxyphene]      darvocet    Dilaudid [Hydromorphone Hcl]     Doxepin     Doxycycline     Duloxetine      Other reaction(s): suicidal ideation    Effexor [Venlafaxine]     Gluten Meal Other (See Comments)    Grapeseed Extract [Nutritional Supplements]      grapes    Hydrochlorothiazide     Iodine     Iron     Levaquin [Levofloxacin In D5w]     Meclizine Swelling and Other (See Comments)    Milk-Related Compounds      Dairy    Other      strawberries    Pecan Nut (Diagnostic)     Prochlorperazine Other (See Comments)    Red Dye     Reglan [Metoclopramide]     Rocephin [Ceftriaxone]     Rosemary Oil     Sulfa Antibiotics     Tetracyclines & Related     Topamax [Topiramate]     Tramadol     Triptans     Hol[place]el [Macadamia Nut Oil]     Zithromax [Azithromycin]     Adhesive Tape Rash    Betamethasone Nausea And Vomiting    Wheat Bran Dermatitis, Itching, Nausea And Vomiting and Rash        Past Medical History:   Diagnosis Date    Abnormal Pap smear of cervix     hpv age 23   [place] Anemia     Anxiety     Asthma     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     Connective tissue disease (HCC)     Cyst of right breast     DDD (degenerative disc disease), cervical     Depression     Diabetes mellitus (HCC)     Dysphagia     Fibromyalgia     Gastroparesis     GERD (gastroesophageal reflux disease)     Headache     Hematuria     Hyperlipidemia     Hypertension     Hypothyroidism     IBS (irritable bowel syndrome)     with constipation and diarrhea    Immune deficiency disorder (HCC)     Insomnia     Kidney stones     Meningitis     x4--- twice after IVIG     PAULINE on CPAP     cpap dependence    PCOS (polycystic ovarian syndrome)     POTS (postural orthostatic tachycardia syndrome)     Pseudotumor cerebri     in 2008--- treated with LP; allergy to diamox    Raynaud's disease     Rectal bleeding     RLS (restless legs syndrome)     Scleroderma (HCC)     Sjogren's disease ([address]     Thyroid nodule     TIA (transient ischemic attack)        Health Maintenance Due   Topic Date Due    Diabetic foot exam  12/18/1987    Diabetic retinal exam  12/18/1987    HIV screen  12/18/1992    Diabetic microalbuminuria test  12/18/1995    Hepatitis B vaccine (1 of 3 - Risk 3-dose series) 12/18/1996    Cervical cancer screen  12/18/1998        Patient Active Problem List   Diagnosis    Vision loss, bilateral    Type 2 diabetes mellitus without complication, with long-term current use of insulin ([address]    Thyroid nodule    Solitary cyst of right breast    Sjogren's syndrome with keratoconjunctivitis sicca ([address]    Retention of urine    Restless legs    Raynaud's phenomenon without gangrene    Primary fibromyalgia syndrome    Postural orthostatic tachycardia syndrome    PAULINE on CPAP    Obesity, Class II, BMI 35-39.9    Other and unspecified hyperlipidemia    Other dysphagia    Lung nodule    Long term current use of insulin (HCC)    Insomnia    Intractable chronic migraine without aura and without status migrainosus    Hypothyroidism    Hypogammaglobulinemia ([address]    History of renal calculi    Gastroparesis    Gastroesophageal reflux disease    Essential hypertension    DDD (degenerative disc disease)    CVID (common variable immunodeficiency) ([address]    Migraine    Irritable bowel syndrome    Constipation    Diarrhea    Benign intracranial hypertension    Depression    Anxiety    Anemia    Polycystic disease, ovaries    Cervicalgia    Asthma    Connective tissue disorder ([address]    Stroke-like symptoms    TIA (transient ischemic attack)    Weakness of extremity    Subcutaneous nodule of abdominal wall    Flu vaccine need    Lower abdominal pain    Acute upper respiratory infection    Acute urinary tract infection    Optic neuritis        Past Surgical History:   Procedure Laterality Date    APPENDECTOMY      CARPAL TUNNEL RELEASE Bilateral     CHOLECYSTECTOMY      HYSTERECTOMY      PORT SURGERY      x3    PYLOROPLASTY          Family History   Problem Relation Age of Onset    Alzheimer's Disease Mother     Alzheimer's Disease Father     Stroke Sister     Osteoporosis Sister     Hypertension Brother     Alzheimer's Disease Paternal Aunt     Alzheimer's Disease Paternal Uncle         Social History     Tobacco Use    Smoking status: Never Smoker    Smokeless tobacco: Never Used   Substance Use Topics    Alcohol use: No    Drug use: No        Objective  There were no vitals filed for this visit. Exam:  Physical Exam     Last labs reviewed. ASSESSMENT & PLAN :   Problem List        Respiratory    Asthma     Continue her DuoNebs 4 times daily and Advair Diskus         Relevant Medications    fluticasone-salmeterol (ADVAIR HFA) 230-21 MCG/ACT inhaler    ipratropium-albuterol (DUONEB) 0.5-2.5 (3) MG/3ML SOLN nebulizer solution    montelukast (SINGULAIR) 10 MG tablet    Acute upper respiratory infection - Primary     Most probably viral in nature. It has to run its course.   She is to contact us if she develops any systemic symptoms as in HPI            Endocrine    Type 2 diabetes mellitus without complication, with long-term current use of insulin ([address]     She is to continue her Lantus and her NovoLog sliding scale on the direction of her endocrinologist.  She will keep close follow-up with her         Relevant Medications    insulin lispro, 1 Unit Dial, (HUMALOG KWIKPEN) 100 UNIT/ML SOPN    insulin glargine (LANTUS SOLOSTAR) 100 UNIT/ML injection pen       Nervous and Auditory    Optic neuritis     Follow-up with  neuro-ophthalmologist at [facility]                Return in about 1 week (around 11/18/2020), or if symptoms worsen or fail to improve.        Erma Garcia,   11/12/2020

## 2020-11-17 ENCOUNTER — CARE COORDINATION (OUTPATIENT)
Dept: CARE COORDINATION | Age: 45
End: 2020-11-17

## 2020-11-17 NOTE — CARE COORDINATION
ACM contacted patient d/t referral from Care Coordination from PCP. ACM introduced self and educated patient on the Care Coordination program.   Patient states that she is not feeling well. She states she has contacted her immunologist at [facility] (Dr. Kamlesh Bassett) and based on the patient's symptoms, this provider has recommended that patient go to the ER in [place] (where Dr. Kamlesh Bassett can follow). Patient states that she has a history of Meningitis and her symptoms are as follows:   Diarrhea x 4, headache (pain rated as 7 on a 0-10 pain scale), fever (101.8), stiff neck since 11/15/20, getting progressively worse with inability to move neck side to side or touch chin to chest, nausea, sensitivity to light, limb, joint pain and muscle spasms, and increased cough and thick, white, mucous production. ACM educated patient that she should be compliant with Dr. Kamlesh Bassett recommendation to proceed to the [place] ED. Patient states understanding and states she will go. ACM gave patient ACM's name and contact information and patient will update ACM on her status. PLAN  -Inform PCP that ACM did reach out to patient and inform her that patient states that she will proceed to the [place] ED. -[facility] will enroll patient in Care Coordination when able.

## 2020-11-20 ENCOUNTER — CARE COORDINATION (OUTPATIENT)
Dept: CARE COORDINATION | Age: 45
End: 2020-11-20

## 2020-11-20 NOTE — CARE COORDINATION
ACM attempted to contact patient to review her status and to further discuss enrollment into Care Coordination. ACM left a detailed voice message including ACM's name and contact information requesting a return call at her earliest convenience.      PLAN  Continue to attempt to contact patient

## 2020-11-24 ENCOUNTER — CARE COORDINATION (OUTPATIENT)
Dept: CARE COORDINATION | Age: 45
End: 2020-11-24

## 2020-11-24 ENCOUNTER — TELEPHONE (OUTPATIENT)
Dept: PHARMACY | Facility: CLINIC | Age: 45
End: 2020-11-24

## 2020-11-24 NOTE — CARE COORDINATION
-Patient returned this ACM's call. [facility] educated patient on Care Coordination. Patient is agreeable to enrollment.  -Patient has many follow up appointments scheduled in the near future. [facility] discussed patient's availability to complete the enrollment process and determined a date and time that will fit into her schedule.  -Patient does agree to talk to a pharmacist to review her medications. PLAN  -Contact patient on date and time that patient agreed on to complete the enrollment process. -Make a referral to a pharmacist to review patient's medications.

## 2020-11-24 NOTE — CARE COORDINATION
[facility]'s second attempt to contact patient to discuss enrollment into Care Coordination was unsuccessful. [facility] left a detailed message including contact information asking for a return call. PLAN  -Continue to attempt to contact patient  -Send introductory letter for Care Coordination to patient per My Chart.

## 2020-11-24 NOTE — LETTER
11/24/2020    Anjana House  [address]      Dear Anjana House,    My name is Lance Weaver and I am a registered nurse who partners with Titi Jung DO to improve patients' health. Titi Jung DO believes you would benefit from working with me. As a member of your health care team, I would work with other providers involved in your care, offer education for your specific health conditions, and connect you with additional resources as needed. I will collaborate with Titi Jung DO to support you in following your treatment plan. The additional support I provide is no additional cost to you. My primary focus is to help you achieve specific goals and improve your health. We are committed to walk with you on this journey and look forward to working with you. Please call me to further discuss your healthcare needs. I am available by phone. You can reach me at [phone].      In good health,     Lance Weaver RN

## 2020-11-24 NOTE — TELEPHONE ENCOUNTER
Received a referral:  from Care Coordinator to review patients medications. Called patient to schedule a time to speak with a pharmacist over the telephone. Spoke to patient and  and advised them of the above message. Patient verified understanding and scheduled their appointment: tomorrow at [address]   Clinical Pharmacy   Toll free: [phone], option 7

## 2020-11-25 ENCOUNTER — SCHEDULED TELEPHONE ENCOUNTER (OUTPATIENT)
Dept: PHARMACY | Facility: CLINIC | Age: 45
End: 2020-11-25

## 2020-11-25 RX ORDER — METHYLPREDNISOLONE 4 MG/1
TABLET ORAL DAILY
COMMUNITY
End: 2021-01-02

## 2020-11-25 RX ORDER — MILK THISTLE 150 MG
CAPSULE ORAL
COMMUNITY
End: 2021-01-26 | Stop reason: ALTCHOICE

## 2020-11-25 RX ORDER — TIZANIDINE 2 MG/1
2 TABLET ORAL 2 TIMES DAILY PRN
COMMUNITY
End: 2020-12-07 | Stop reason: SDUPTHER

## 2020-11-25 NOTE — TELEPHONE ENCOUNTER
Mihaela Thomas DO, medication review completed with patient:    · She recently reach out to you about a CGM. Based on our discussion, myself and the patient believe that the Dexcom G6 CGM is the most appropriate one. She told me that it will need to come through a DME supplier and not a traditional pharmacy. It will likely require a PA, but based on traditional PA criteria, I believe that she should be able to get this covered due to her vision, insulin use, and testing multiple times a day. · Patient reported muscle weakness and cramping after starting the statin. She understands the importance and does not want to stop taking. It may be reasonable to trial another statin such as Pravastatin or Rosuvastatin to see if this improves these symptoms. · Your patient reported some issues with masked hypoglycemia. She is taking propranolol which is known to cause this more than some other BB's such as metoprolol. Would something like metoprolol be a better choice? She says she was on at one point but had issues with low BP and was put on propranolol instead. Other potential options might include a CCB such as diltiazem or verapamil. Patient has appointment with you 12/14/20. Please discuss with patient at appointment or route back to me to discuss with patient. See note below for complete details. Thank you,    ERA Landis, PharmD, [address]  Phone: [phone] option-7        =======================================================          CLINICAL PHARMACY NOTE - Medication Review  Patient outreach to review medications - Spoke with patient and spouse. SUBJECTIVE/OBJECTIVE:   Anita Orozco is a 45 y.o. female referred to a clinical pharmacy specialist by care coordination and provider given their history of frequent ED admissions.     Medications:  Medication Sig comments    insulin glargine (LANTUS SOLOSTAR) 100 UNIT/ML injection pen  - Patient reports using 4 units in the morning and 14 units in the evening    BENZONATATE PO  - Using prn    ketorolac (TORADOL) 10 MG tablet Take 1 tablet by mouth every 6 hours as needed for Pain - Uses very sparingly for migraines. Understands danger and limits on this medication.  Magnesium 500 MG TABS Take 1 tablet by mouth 2 times daily - Using as prescribed for migraines    insulin lispro, 1 Unit Dial, (HUMALOG KWIKPEN) 100 UNIT/ML SOPN Inject into the skin daily Sliding scale - Uses the following sliding scale:  4 units for 150-200, then +2 for every 50 up to a max of 12units.    - On average she uses 30-35 units per day  - Recommended that she ask her endo for an updated prescription    Probiotic Product (PROBIOTIC DAILY PO)  - Using as prescribed    polyethylene glycol (GLYCOLAX) 17 g packet Take 17 g by mouth 2 times daily as needed for Constipation - pt reports using pretty much daily    propranolol (INDERAL) 10 MG tablet Take 1 tablet by mouth 3 times daily - Using as prescribed    furosemide (LASIX) 20 MG tablet Take 1 tablet by mouth daily - Using as prescribed    aspirin 81 MG EC tablet Take 81 mg by mouth daily  - Using as prescribed    atorvastatin (LIPITOR) 10 MG tablet Take 1 tablet by mouth daily - taking as prescribed, but having issues with myalgia.   - Recommended pravastatin or rosuvastatin    rOPINIRole (REQUIP) 0.5 MG tablet Take 1 tablet by mouth See Admin Instructions 1 tablet - breakfast  2 tablets - supper (Patient taking differently: Take 0.5 mg by mouth See Admin Instructions 1 tablet qAM and 2 tablets qPM) - 0.5mg in am and 1mg at night    levothyroxine (SYNTHROID) 50 MCG tablet Take 2.5 tablets by mouth Five times weekly Given Monday,Tuesday,Wednesday,Thursday,Friday - Using as prescribed  - uses because she is allergic to dye    levothyroxine (SYNTHROID) 50 MCG tablet Take 2 tablets by mouth Twice a Week Given Saturday,Sunday - Using as prescribed  - uses because she is allergic to dye    pantoprazole (PROTONIX) 40 MG tablet Take 1 tablet by mouth every morning (before breakfast) - Using as prescribed    folic acid (FOLVITE) 1 MG tablet Take 1 tablet by mouth Daily with lunch - Using as prescribed    acetaminophen (TYLENOL) 325 MG tablet Take 650 mg by mouth every 6 hours as needed for Pain - Using as prescribed    sodium chloride (OCEAN, BABY AYR) 0.65 % nasal spray 1 spray by Nasal route 4 times daily as needed for Congestion - Using as prescribed    azelastine (ASTELIN) 0.1 % nasal spray 1 spray by Nasal route 2 times daily - Using as prescribed    diphenhydrAMINE (BENYLIN) 12.5 MG/5ML liquid Take 25 mg by mouth 4 times daily as needed for Allergies (migrane break through) prn    Artificial Saliva (BIOTENE DRY MOUTH MOISTURIZING MT) Take 1 spray by mouth every 3 hours prn    clotrimazole (LOTRIMIN) 1 % cream Apply topically 2 times daily as needed Under breast and abdominal folds prn    OIL OF OREGANO PO Take 60 mg by mouth daily (with breakfast) - Using as directed    miconazole (MICOTIN) 2 % powder Apply topically daily Apply to groin, under breast and skin folds. - Using as prescribed    magnesium hydroxide (MILK OF MAGNESIA) 400 MG/5ML suspension Take 30 mLs by mouth 4 times daily as needed for Constipation prn    SENNA LEAVES PO Take 470 mg by mouth three times daily  - Reports using BID on a regular basis. States she has been instructed by gi  that she is ok to do this.  promethazine (PHENERGAN) 25 MG tablet Take 25 mg by mouth every 6 hours as needed for Nausea Prn only breakthrough nausea when ginger and zofran does not work.     triamcinolone (KENALOG) 0.1 % cream Apply topically daily as needed (rash) - Using as prescribed    NONFORMULARY Take 2 capsules by mouth 2 times daily (with meals) Turmeric Ginger Using for nausea, also uses ginger    potassium chloride (KLOR-CON) 20 MEQ packet Take 20 mEq by mouth daily - Using as prescribed  levomilnacipran (FETZIMA) 40 MG CP24 extended release capsule Take 40 mg by mouth daily - Using as prescribed    Galcanezumab-gnlm (EMGALITY) 120 MG/ML SOAJ Inject 120 mg into the skin every 30 days  - Using as prescribed    clonazePAM (KLONOPIN) 0.5 MG tablet Take 0.5 mg by mouth 2 times daily. - Using as prescribed    divalproex (DEPAKOTE) 250 MG DR tablet Take 250 mg by mouth See Admin Instructions 250 mg - AM  250 mg - Afternoon  500 mg - PM - Using as prescribed    econazole nitrate 1 % cream Apply topically daily as needed (Apply under breasts and folds)  - Using as prescribed    fluticasone (FLONASE) 50 MCG/ACT nasal spray 1 spray by Nasal route 2 times daily  - Using as prescribed    fluticasone-salmeterol (ADVAIR HFA) 230-21 MCG/ACT inhaler Inhale 2 puffs into the lungs 2 times daily - Using as prescribed    ipratropium-albuterol (DUONEB) 0.5-2.5 (3) MG/3ML SOLN nebulizer solution Inhale 1 vial into the lungs every 4 hours as needed for Shortness of Breath  - Using as prescribed    montelukast (SINGULAIR) 10 MG tablet Take 10 mg by mouth nightly - Using as prescribed    ondansetron (ZOFRAN-ODT) 8 MG TBDP disintegrating tablet Take 8 mg by mouth every 8 hours as needed for Nausea or Vomiting  - Using as prescribed prn     Additional Medications (including OTC/Herbal Supplements): added to Epic medication list  · Tizanidine 2mg hs, milk thistle seed QD, refresh eye drops,     Allergies:    Allergies   Allergen Reactions    Diamox [Acetazolamide] Shortness Of Breath and Rash    Abilify [Aripiprazole]      Fevers and Tremors    Aspartame And Phenylalanine Diarrhea     Blood sugar Les, Diarrhea    Cymbalta [Duloxetine Hcl]     Darvon [Propoxyphene]      darvocet    Dilaudid [Hydromorphone Hcl]     Doxepin     Doxycycline     Duloxetine      Other reaction(s): suicidal ideation    Effexor [Venlafaxine]     Gluten Meal Other (See Comments)    Grapeseed Extract [Nutritional Supplements] grapes    Hydrochlorothiazide     Iodine     Iron     Levaquin [Levofloxacin In D5w]     Meclizine Swelling and Other (See Comments)    Milk-Related Compounds      Dairy    Other      strawberries    Pecan Nut (Diagnostic)     Prochlorperazine Other (See Comments)    Red Dye     Reglan [Metoclopramide]     Rocephin [Ceftriaxone]     Rosemary Oil     Sulfa Antibiotics     Tetracyclines & Related     Topamax [Topiramate]     Tramadol     Triptans     [place] [Macadamia Nut Oil]     Zithromax [Azithromycin]     Adhesive Tape Rash    Betamethasone Nausea And Vomiting    Wheat Bran Dermatitis, Itching, Nausea And Vomiting and Rash       Pertinent Labs/Vitals:  BP Readings from Last 3 Encounters:   10/20/20 134/85   10/19/20 115/67   10/19/20 112/80     No results found for: LABMICR, [id number]  Lab Results   Component Value Date    LABA1C 7.0 (H) 08/28/2020     Lab Results   Component Value Date    CHOL 130 08/28/2020    TRIG 116 08/28/2020    HDL 43 08/28/2020    LDLCALC 64 08/28/2020     ALT   Date Value Ref Range Status   10/18/2020 24 0 - 32 U/L Final     AST   Date Value Ref Range Status   10/18/2020 27 0 - 31 U/L Final     Comment:     Specimen is slightly Hemolyzed. Result may be artificially increased.      The 10-year ASCVD risk score (Melissa Yoder et al., 2013) is: 1.3%    Values used to calculate the score:      Age: 45 years      Sex: Female      Is Non- : No      Diabetic: Yes      Tobacco smoker: No      Systolic Blood Pressure: 768 mmHg      Is BP treated: Yes      HDL Cholesterol: 43 mg/dL      Total Cholesterol: 130 mg/dL     Lab Results   Component Value Date    CREATININE 0.8 10/18/2020     eGFR: >60 mL/min/1.73 m^2    Social History:   Social History     Tobacco Use    Smoking status: Never Smoker    Smokeless tobacco: Never Used   Substance Use Topics    Alcohol use: No       Immunizations:   Most Recent Immunizations   Administered Date(s) Administered    Influenza Virus Vaccine 12/05/2019    Influenza Whole 09/26/2011    Influenza, Quadv, IM, PF (6 mo and older Fluzone, Flulaval, Fluarix, and 3 yrs and older Afluria) 10/05/2020    Pneumococcal Conjugate 13-valent ([id number]) 04/10/2015    Pneumococcal Polysaccharide ([id number]) 10/05/2015    Td (Adult), 5 Lf Tetanus Toxoid, Pf (Tenivac, Decavac) 12/07/2006    Td vaccine (adult) 12/07/2006    Tdap (Boostrix, Adacel) 10/07/2016       ASSESSMENT/PLAN:   - General Assessment:   · Adherence: Does not seem to be an issue. Patient has little to no vision and reports that her spouse helps with her medications and she takes what is prescribed regularly. She is also very knowledgeable of her medications and therapies. She was able to   · Cost: not a concern  · Drug interactions: The following clinically significant interactions were identified via Monetate Interaction Analysis as category D or higher: Propranolol and insulin- recommend trying another BB such as metoprolol or CCB such as diliazem or verapamil to reduce masked hypoglycemia  · Renal dosing: No renal adjustments necessary. · Immunizations: Did not discuss    - Diabetes:    Glycemic goal: <7.0%. Is at blood glucose goal.  This may change by the time she has her next a1c. She reports her BG running much higher recently and is also currently using steroids from her pulmonologist.  Marissa Dia Current symptoms/problems: polydipsia and nausea   Home blood sugar records: Pt did not report any readings, but did state that they have veronica much higher recently   Any episodes of hypoglycemia: yes - reports that she experiences nighttime lows. She also has reported some daytime lows and often cannot tell when they are happening. Likely due to BB use.    Current testing supplies/frequency: [employer] ([employer] brand)- up to 4-5x daily   Pen needles/syringes: generic   De-escalation of therapy: Patient said that she had her sliding scale adjusted up recently due to high readings and steroids. I reminded her that she will need to let her endo know when they end so that they can adjust again   Barriers to success: Vision- pt has help from spouse, but I think she would greatly benefit from CGM. - Upcoming appointments:   Future Appointments   Date Time Provider Alisa Glendy   11/25/2020 10:00 AM SCHEDULE, [facility] None   12/14/2020  8:30 AM DO Beth Carvalho [facility]   2/23/2021  3:20 PM Scarlet Begum, APRN - NP [facility]     WTania Denton, PharmD, [address] [facility]  Phone: [phone] option-7      CLINICAL PHARMACY CONSULT: MED RECONCILIATION/REVIEW ADDENDUM    For Pharmacy Admin Tracking Only    PHSO: Yes  Total # of Interventions Recommended: 3  - Discontinued Medication #: 1 Discontinue Reason(s): Interaction  - New Order #: 1 New Medication Order Reason(s): Needs Additional Medication Therapy  - Updated Order #: 1 Updated Order Reason(s):  Other  Recommended intervention potential cost savings: 1  Total Interventions Accepted: 1  Time Spent (min): 1898 Carmen Villatoro, PharmD  [address]

## 2020-11-27 NOTE — TELEPHONE ENCOUNTER
PCP response noted. Will sign off. Thank you!   Bhavani MesaD, [address]  Phone: [phone] option-7

## 2020-12-01 ENCOUNTER — OFFICE VISIT (OUTPATIENT)
Dept: PRIMARY CARE CLINIC | Age: 45
End: 2020-12-01
Payer: MEDICAID

## 2020-12-01 VITALS
RESPIRATION RATE: 18 BRPM | OXYGEN SATURATION: 99 % | WEIGHT: 185 LBS | HEART RATE: 89 BPM | BODY MASS INDEX: 34.04 KG/M2 | HEIGHT: 62 IN | TEMPERATURE: 98.4 F | SYSTOLIC BLOOD PRESSURE: 122 MMHG | DIASTOLIC BLOOD PRESSURE: 80 MMHG

## 2020-12-01 DIAGNOSIS — Z20.822 ENCOUNTER FOR LABORATORY TESTING FOR COVID-19 VIRUS: ICD-10-CM

## 2020-12-01 LAB
INFLUENZA A ANTIGEN, POC: NEGATIVE
INFLUENZA B ANTIGEN, POC: NEGATIVE
Lab: NORMAL
QC PASS/FAIL: NORMAL
SARS-COV-2, POC: NORMAL

## 2020-12-01 PROCEDURE — 99202 OFFICE O/P NEW SF 15 MIN: CPT | Performed by: CLINICAL NURSE SPECIALIST

## 2020-12-01 PROCEDURE — 87426 SARSCOV CORONAVIRUS AG IA: CPT | Performed by: CLINICAL NURSE SPECIALIST

## 2020-12-01 PROCEDURE — 87804 INFLUENZA ASSAY W/OPTIC: CPT | Performed by: CLINICAL NURSE SPECIALIST

## 2020-12-01 PROCEDURE — 1036F TOBACCO NON-USER: CPT | Performed by: CLINICAL NURSE SPECIALIST

## 2020-12-01 PROCEDURE — G8482 FLU IMMUNIZE ORDER/ADMIN: HCPCS | Performed by: CLINICAL NURSE SPECIALIST

## 2020-12-01 PROCEDURE — G8417 CALC BMI ABV UP PARAM F/U: HCPCS | Performed by: CLINICAL NURSE SPECIALIST

## 2020-12-01 PROCEDURE — G8427 DOCREV CUR MEDS BY ELIG CLIN: HCPCS | Performed by: CLINICAL NURSE SPECIALIST

## 2020-12-01 RX ORDER — BENZONATATE 100 MG/1
100 CAPSULE ORAL 3 TIMES DAILY PRN
Qty: 30 CAPSULE | Refills: 0 | Status: SHIPPED | OUTPATIENT
Start: 2020-12-01 | End: 2020-12-31

## 2020-12-01 RX ORDER — AMOXICILLIN AND CLAVULANATE POTASSIUM 875; 125 MG/1; MG/1
1 TABLET, FILM COATED ORAL 2 TIMES DAILY
Qty: 20 TABLET | Refills: 0 | Status: SHIPPED | OUTPATIENT
Start: 2020-12-01 | End: 2020-12-11

## 2020-12-02 LAB — SARS-COV-2, PCR: NOT DETECTED

## 2020-12-03 ENCOUNTER — APPOINTMENT (OUTPATIENT)
Dept: CT IMAGING | Age: 45
End: 2020-12-03
Payer: MEDICAID

## 2020-12-03 ENCOUNTER — HOSPITAL ENCOUNTER (EMERGENCY)
Age: 45
Discharge: HOME | End: 2020-12-04
Attending: EMERGENCY MEDICINE
Payer: MEDICAID

## 2020-12-03 ENCOUNTER — APPOINTMENT (OUTPATIENT)
Dept: GENERAL RADIOLOGY | Age: 45
End: 2020-12-03
Payer: MEDICAID

## 2020-12-03 ENCOUNTER — TELEPHONE (OUTPATIENT)
Dept: PRIMARY CARE CLINIC | Age: 45
End: 2020-12-03

## 2020-12-03 LAB
ALBUMIN SERPL-MCNC: 4.2 G/DL (ref 3.5–5.2)
ALP BLD-CCNC: 60 U/L (ref 35–104)
ALT SERPL-CCNC: 20 U/L (ref 0–32)
ANION GAP SERPL CALCULATED.3IONS-SCNC: 10 MMOL/L (ref 7–16)
AST SERPL-CCNC: 20 U/L (ref 0–31)
BASOPHILS ABSOLUTE: 0.02 E9/L (ref 0–0.2)
BASOPHILS RELATIVE PERCENT: 0.2 % (ref 0–2)
BILIRUB SERPL-MCNC: 0.3 MG/DL (ref 0–1.2)
BUN BLDV-MCNC: 16 MG/DL (ref 6–20)
CALCIUM SERPL-MCNC: 9 MG/DL (ref 8.6–10.2)
CHLORIDE BLD-SCNC: 98 MMOL/L (ref 98–107)
CO2: 28 MMOL/L (ref 22–29)
CREAT SERPL-MCNC: 0.7 MG/DL (ref 0.5–1)
EOSINOPHILS ABSOLUTE: 0 E9/L (ref 0.05–0.5)
EOSINOPHILS RELATIVE PERCENT: 0 % (ref 0–6)
GFR AFRICAN AMERICAN: >60
GFR NON-AFRICAN AMERICAN: >60 ML/MIN/1.73
GLUCOSE BLD-MCNC: 258 MG/DL (ref 74–99)
HCT VFR BLD CALC: 45.2 % (ref 34–48)
HEMOGLOBIN: 15 G/DL (ref 11.5–15.5)
IMMATURE GRANULOCYTES #: 0.12 E9/L
IMMATURE GRANULOCYTES %: 1.3 % (ref 0–5)
LYMPHOCYTES ABSOLUTE: 1.16 E9/L (ref 1.5–4)
LYMPHOCYTES RELATIVE PERCENT: 12.5 % (ref 20–42)
MCH RBC QN AUTO: 30 PG (ref 26–35)
MCHC RBC AUTO-ENTMCNC: 33.2 % (ref 32–34.5)
MCV RBC AUTO: 90.4 FL (ref 80–99.9)
MONOCYTES ABSOLUTE: 0.44 E9/L (ref 0.1–0.95)
MONOCYTES RELATIVE PERCENT: 4.7 % (ref 2–12)
NEUTROPHILS ABSOLUTE: 7.54 E9/L (ref 1.8–7.3)
NEUTROPHILS RELATIVE PERCENT: 81.3 % (ref 43–80)
PDW BLD-RTO: 11.9 FL (ref 11.5–15)
PLATELET # BLD: 242 E9/L (ref 130–450)
PMV BLD AUTO: 10.6 FL (ref 7–12)
POTASSIUM SERPL-SCNC: 4.3 MMOL/L (ref 3.5–5)
PRO-BNP: 28 PG/ML (ref 0–125)
RBC # BLD: 5 E12/L (ref 3.5–5.5)
SODIUM BLD-SCNC: 136 MMOL/L (ref 132–146)
TOTAL PROTEIN: 6.4 G/DL (ref 6.4–8.3)
TROPONIN: <0.01 NG/ML (ref 0–0.03)
WBC # BLD: 9.3 E9/L (ref 4.5–11.5)

## 2020-12-03 PROCEDURE — 84484 ASSAY OF TROPONIN QUANT: CPT

## 2020-12-03 PROCEDURE — 83880 ASSAY OF NATRIURETIC PEPTIDE: CPT

## 2020-12-03 PROCEDURE — 70450 CT HEAD/BRAIN W/O DYE: CPT

## 2020-12-03 PROCEDURE — 85025 COMPLETE CBC W/AUTO DIFF WBC: CPT

## 2020-12-03 PROCEDURE — 2580000003 HC RX 258: Performed by: EMERGENCY MEDICINE

## 2020-12-03 PROCEDURE — 99284 EMERGENCY DEPT VISIT MOD MDM: CPT | Performed by: EMERGENCY MEDICINE

## 2020-12-03 PROCEDURE — 80053 COMPREHEN METABOLIC PANEL: CPT

## 2020-12-03 PROCEDURE — 71275 CT ANGIOGRAPHY CHEST: CPT

## 2020-12-03 PROCEDURE — 93005 ELECTROCARDIOGRAM TRACING: CPT | Performed by: NURSE PRACTITIONER

## 2020-12-03 PROCEDURE — 6360000004 HC RX CONTRAST MEDICATION: Performed by: RADIOLOGY

## 2020-12-03 PROCEDURE — 71046 X-RAY EXAM CHEST 2 VIEWS: CPT

## 2020-12-03 RX ORDER — SODIUM CHLORIDE 0.9 % (FLUSH) 0.9 %
10 SYRINGE (ML) INJECTION PRN
Status: COMPLETED | OUTPATIENT
Start: 2020-12-03 | End: 2020-12-04

## 2020-12-03 RX ORDER — 0.9 % SODIUM CHLORIDE 0.9 %
1000 INTRAVENOUS SOLUTION INTRAVENOUS ONCE
Status: COMPLETED | OUTPATIENT
Start: 2020-12-03 | End: 2020-12-04

## 2020-12-03 RX ADMIN — IOPAMIDOL 70 ML: 755 INJECTION, SOLUTION INTRAVENOUS at 23:59

## 2020-12-03 RX ADMIN — SODIUM CHLORIDE 1000 ML: 9 INJECTION, SOLUTION INTRAVENOUS at 23:01

## 2020-12-03 NOTE — TELEPHONE ENCOUNTER
I just reviewed your covid testing result and it is negative. I am not sure why the lab marks these cancelled but be assured the lab was completed and it is negative. This one is not the rapid result. If your pulse ox continues to be low then you should either contact PCP for possible chest xray, return to the flu clinic for reevaluation or go to the ER for evaluation and treatment. Hope that you are feeling better today. I did not receive your message on my end yesterday and just noticed that you had a question when I opened your chart. Sorry for any confusion on this. Hope that you are doing better today.

## 2020-12-04 ENCOUNTER — CARE COORDINATION (OUTPATIENT)
Dept: CARE COORDINATION | Age: 45
End: 2020-12-04

## 2020-12-04 VITALS
DIASTOLIC BLOOD PRESSURE: 87 MMHG | RESPIRATION RATE: 18 BRPM | BODY MASS INDEX: 34.23 KG/M2 | HEART RATE: 91 BPM | HEIGHT: 62 IN | WEIGHT: 186 LBS | OXYGEN SATURATION: 99 % | SYSTOLIC BLOOD PRESSURE: 123 MMHG | TEMPERATURE: 96.9 F

## 2020-12-04 LAB
EKG ATRIAL RATE: 94 BPM
EKG P AXIS: 72 DEGREES
EKG P-R INTERVAL: 144 MS
EKG Q-T INTERVAL: 352 MS
EKG QRS DURATION: 72 MS
EKG QTC CALCULATION (BAZETT): 440 MS
EKG R AXIS: 64 DEGREES
EKG T AXIS: 13 DEGREES
EKG VENTRICULAR RATE: 94 BPM

## 2020-12-04 PROCEDURE — 2580000003 HC RX 258: Performed by: RADIOLOGY

## 2020-12-04 PROCEDURE — 6360000002 HC RX W HCPCS

## 2020-12-04 PROCEDURE — 93010 ELECTROCARDIOGRAM REPORT: CPT | Performed by: INTERNAL MEDICINE

## 2020-12-04 RX ORDER — PROMETHAZINE HYDROCHLORIDE AND CODEINE PHOSPHATE 6.25; 1 MG/5ML; MG/5ML
5 SYRUP ORAL 4 TIMES DAILY PRN
Qty: 60 ML | Refills: 0 | Status: SHIPPED | OUTPATIENT
Start: 2020-12-04 | End: 2020-12-07

## 2020-12-04 RX ORDER — HEPARIN SODIUM (PORCINE) LOCK FLUSH IV SOLN 100 UNIT/ML 100 UNIT/ML
SOLUTION INTRAVENOUS
Status: COMPLETED
Start: 2020-12-04 | End: 2020-12-04

## 2020-12-04 RX ADMIN — Medication 10 ML: at 00:00

## 2020-12-04 RX ADMIN — SODIUM CHLORIDE, PRESERVATIVE FREE: 5 INJECTION INTRAVENOUS at 01:53

## 2020-12-04 NOTE — CARE COORDINATION
Patient's  Michelle Moreno) contacted this ACM stating that [place] does not feel well enough to talk on the phone to complete the enrollment for Care Coordination. Patient contacted regarding recent discharge and COVID-19 risk. Discussed COVID-19 related testing which was available at this time. Test results were negative. Patient informed of results, if available? ACM informed patient's  that COVID test performed on 20 was negative. Care Transition Nurse/ Ambulatory Care Manager contacted the family () by telephone to perform post discharge assessment. Verified name and  with family as identifiers. Patient presented to the ED for cough and shortness of breath. ACM did not speak to patient on this outreach. She continues to not feel well. Patient has following risk factors of: immunocompromised and diabetes. CTN/ACM reviewed discharge instructions, medical action plan and red flags related to discharge diagnosis. Reviewed and educated them on any new and changed medications related to discharge diagnosis. Advised obtaining a 90-day supply of all daily and as-needed medications. Patient is taking medications as prescribed. Education provided regarding infection prevention, and signs and symptoms of COVID-19 and when to seek medical attention with family who verbalized understanding. Discussed exposure protocols and quarantine from [address] you at higher risk for severe illness 2019 and given an opportunity for questions and concerns. The family agrees to contact the COVID-19 hotline [phone] or PCP office for questions related to their healthcare. CTN/ACM provided contact information for future reference. LOOP not offered d/t ACM will follow patient in Care Coordination    Patient is active on My Chart and is aware how to utilize this tool. From CDC: Are you at higher risk for severe illness?  Wash your hands often.    Avoid close contact (6

## 2020-12-04 NOTE — ED NOTES
Port deaccessed with out incident pt alert orient skin warm dry resp easy  Discharge instructions given to pt verbalized understanding discharged via wheelchair assisted into vehicle     [facility]  12/04/20 0092

## 2020-12-04 NOTE — ED PROVIDER NOTES
HPI:  12/3/20,   Time: 10:35 PM CASSIDY Lester is a 45 y.o. female presenting to the ED for cough and shortness of breath, beginning 3 days ago. The complaint has been persistent, moderate in severity, and worsened by nothing. The patient states over the last month she has had several episodes of pneumonia and has been treated with antibiotics. She states she is currently on Augmentin and steroids. She states she has been having cough and shortness of breath over the last few days. She states she had a rapid Covid test and currently is pending a PCR. She states that her shortness of breath is progressively worsened and she is coughing up white sputum with blood-tinged mucus therefore she came to the ED to be evaluated. Patient states that today she was having some left thigh pain and it was a little erythematous therefore she is using some eyedrops today. Otherwise no other acute symptoms.     Review of Systems:   Pertinent positives and negatives are stated within HPI, all other systems reviewed and are negative.          --------------------------------------------- PAST HISTORY ---------------------------------------------  Past Medical History:  has a past medical history of Abnormal Pap smear of cervix, Anemia, Anxiety, Asthma, Blind, Connective tissue disease ([address] Cyst of right breast, DDD (degenerative disc disease), cervical, Depression, Diabetes mellitus ([address] Dysphagia, Fibromyalgia, Gastroparesis, GERD (gastroesophageal reflux disease), Headache, Hematuria, Hyperlipidemia, Hypertension, Hypothyroidism, IBS (irritable bowel syndrome), Immune deficiency disorder ([address] Insomnia, Kidney stones, Meningitis, PAULINE on CPAP, PCOS (polycystic ovarian syndrome), POTS (postural orthostatic tachycardia syndrome), Pseudotumor cerebri, Raynaud's disease, Rectal bleeding, RLS (restless legs syndrome), Scleroderma ([address] Sjogren's disease ([address] Thyroid nodule, and TIA (transient ischemic attack). Past Surgical History:  has a past surgical history that includes Hysterectomy; Cholecystectomy; Appendectomy; Carpal tunnel release (Bilateral); Port Surgery; and Pyloroplasty. Social History:  reports that she has never smoked. She has never used smokeless tobacco. She reports that she does not drink alcohol or use drugs. Family History: family history includes Alzheimer's Disease in her father, mother, paternal aunt, and paternal uncle; Hypertension in her brother; Osteoporosis in her sister; Stroke in her sister. The patients home medications have been reviewed. Allergies: Diamox [acetazolamide]; Abilify [aripiprazole]; Aspartame and phenylalanine; Cymbalta [duloxetine hcl]; Darvon [propoxyphene]; Dilaudid [hydromorphone hcl]; Doxepin; Doxycycline; Duloxetine; Effexor [venlafaxine]; Gluten meal; Grapeseed extract [nutritional supplements]; Hydrochlorothiazide; Iodine; Iron; Levaquin [levofloxacin in d5w]; Meclizine; Milk-related compounds; Other; Pecan nut (diagnostic); Prochlorperazine; Red dye; Reglan [metoclopramide]; Rocephin [ceftriaxone]; Rosemary oil; Sulfa antibiotics; Tetracyclines & related; Topamax [topiramate]; Tramadol; Triptans; [place] nut oil]; Zithromax [azithromycin]; Adhesive tape; Betamethasone; and Wheat bran        ---------------------------------------------------PHYSICAL EXAM--------------------------------------    Constitutional/General: Alert and oriented x3, well appearing, non toxic in NAD  Head: Normocephalic and atraumatic  Eyes: PERRL, EOMI, conjunctive normal, sclera non icteric  Mouth: Oropharynx clear, handling secretions, no trismus, no asymmetry of the posterior oropharynx or uvular edema  Neck: Supple, full ROM, non tender to palpation in the midline, no stridor, no crepitus, no meningeal signs  Respiratory: Lungs clear to auscultation bilaterally, no wheezes, rales, or rhonchi. Not in respiratory distress  Cardiovascular:  Regular rate. Regular rhythm. No murmurs, gallops, or rubs. 2+ distal pulses  GI:  Abdomen Soft, Non tender, Non distended. +BS. No organomegaly, no palpable masses,  No rebound, guarding, or rigidity. Musculoskeletal: Moves all extremities x 4. Warm and well perfused, no clubbing, cyanosis, or edema. Capillary refill <3 seconds  Integument: skin warm and dry. No rashes. Neurologic: GCS 15, no focal deficits, symmetric strength 5/5 in the upper and lower extremities bilaterally  Psychiatric: Normal Affect    -------------------------------------------------- RESULTS -------------------------------------------------  I have personally reviewed all laboratory and imaging results for this patient. Results are listed below.      LABS:  Results for orders placed or performed during the hospital encounter of 12/03/20   Troponin   Result Value Ref Range    Troponin <0.01 0.00 - 0.03 ng/mL   CBC Auto Differential   Result Value Ref Range    WBC 9.3 4.5 - 11.5 E9/L    RBC 5.00 3.50 - 5.50 E12/L    Hemoglobin 15.0 11.5 - 15.5 g/dL    Hematocrit 45.2 34.0 - 48.0 %    MCV 90.4 80.0 - 99.9 fL    MCH 30.0 26.0 - 35.0 pg    MCHC 33.2 32.0 - 34.5 %    RDW 11.9 11.5 - 15.0 fL    Platelets 685 591 - 287 E9/L    MPV 10.6 7.0 - 12.0 fL    Neutrophils % 81.3 (H) 43.0 - 80.0 %    Immature Granulocytes % 1.3 0.0 - 5.0 %    Lymphocytes % 12.5 (L) 20.0 - 42.0 %    Monocytes % 4.7 2.0 - 12.0 %    Eosinophils % 0.0 0.0 - 6.0 %    Basophils % 0.2 0.0 - 2.0 %    Neutrophils Absolute 7.54 (H) 1.80 - 7.30 E9/L    Immature Granulocytes # 0.12 E9/L    Lymphocytes Absolute 1.16 (L) 1.50 - 4.00 E9/L    Monocytes Absolute 0.44 0.10 - 0.95 E9/L    Eosinophils Absolute 0.00 (L) 0.05 - 0.50 E9/L    Basophils Absolute 0.02 0.00 - 0.20 E9/L   Comprehensive Metabolic Panel   Result Value Ref Range    Sodium 136 132 - 146 mmol/L    Potassium 4.3 3.5 - 5.0 mmol/L    Chloride 98 98 - 107 mmol/L    CO2 28 22 - 29 mmol/L    Anion Gap 10 7 - 16 mmol/L    Glucose 258 (H) 74 of 258. Troponin negative. BMP within normal limits. Head CT and chest x-ray unremarkable. CTA shows no PE. No pulmonary infiltrates noted. Patient given IV fluids. On reevaluation patient is feeling much better. She will be sent home with cough medication. Patient will undergo isolation until Covid test returns. Patient's eye appears within normal limits. No conjunctival erythema. Extraocular movements are intact. Return precautions given. Patient agrees with plan for discharge home. I, Dr. Keri Huynh, am the primary provider for this encounter    This patient's ED course included: a personal history and physicial examination and re-evaluation prior to disposition    This patient has remained hemodynamically stable during their ED course. Re-Evaluations:             Re-evaluation. Patients symptoms are improving      Counseling: The emergency provider has spoken with the patient and discussed todays results, in addition to providing specific details for the plan of care and counseling regarding the diagnosis and prognosis. Questions are answered at this time and they are agreeable with the plan.       --------------------------------- IMPRESSION AND DISPOSITION ---------------------------------    IMPRESSION  1. Dyspnea, unspecified type    2. Chest pain, unspecified type    3. Hyperglycemia        DISPOSITION  Disposition: Discharge to home  Patient condition is stable    NOTE: This report was transcribed using voice recognition software.  Every effort was made to ensure accuracy; however, inadvertent computerized transcription errors may be present        Miriam Arambula DO  12/04/20 0040

## 2020-12-04 NOTE — ED NOTES
Pt alert oriented skin warm dry resp easy c/o burning pain to left eye 6/10  Lungs cta     Debora Benitez RN  12/04/20 0023

## 2020-12-04 NOTE — ED NOTES
FIRST PROVIDER CONTACT ASSESSMENT NOTE      Department of Emergency Medicine   ED  First Provider Note   12/3/20  8:18 PM EST    Chief Complaint: Shortness of Breath (cough, white with blood tinged mucus, left eye pain, which pt is blind so its abnormal for her)      History of Present Illness:    Vielka Gupta is a 45 y.o. female who presents to the ED by private car for complaints of shortness of breath, patient denies chest pain with this. Just reports that she has felt short of breath since Monday, states that she is coughing bringing up clear-colored sputum did notice some blood-tinged. She also complains of pain to her left thigh. Patient is blind. Says normally does not have pain associated. She denies any abdominal pain denies any fevers as well as no noted vomiting or diarrhea    Focused Screening Exam:  Constitutional:  Alert, appears stated age and is in no distress. *ALLERGIES*     Diamox [acetazolamide]; Abilify [aripiprazole]; Aspartame and phenylalanine; Cymbalta [duloxetine hcl]; Darvon [propoxyphene]; Dilaudid [hydromorphone hcl]; Doxepin; Doxycycline; Duloxetine; Effexor [venlafaxine]; Gluten meal; Grapeseed extract [nutritional supplements]; Hydrochlorothiazide; Iodine; Iron; Levaquin [levofloxacin in d5w]; Meclizine; Milk-related compounds; Other; Pecan nut (diagnostic); Prochlorperazine; Red dye; Reglan [metoclopramide]; Rocephin [ceftriaxone]; Rosemary oil; Sulfa antibiotics; Tetracyclines & related; Topamax [topiramate]; Tramadol; Triptans; [place] nut oil]; Zithromax [azithromycin];  Adhesive tape; Betamethasone; and Wheat bran     ED Triage Vitals   BP Temp Temp src Pulse Resp SpO2 Height Weight   12/03/20 2015 12/03/20 1952 -- 12/03/20 1952 12/03/20 2015 12/03/20 1952 12/03/20 2015 12/03/20 2015   (!) 164/109 96.9 °F (36.1 °C)  105 18 98 % 5' 2\" (1.575 m) 186 lb (84.4 kg)        Initial Plan of Care:  Initiate Treatment-Testing, Proceed toTreatment Area When Bed Available for ED Attending/MLP to Continue Care    -----------------END OF FIRST PROVIDER CONTACT ASSESSMENT NOTE--------------  Electronically signed by REYNOLD Nieto CNP   DD: 12/3/20         REYNOLD Nieto CNP  12/03/20 2019

## 2020-12-04 NOTE — ED NOTES
Radiology Procedure Waiver   Name: Vee Dill  : 1975  MRN: [id number]    Date:  12/3/20    Time: 10:34 PM EST    Benefits of immediately proceeding with Radiology exam(s) without pre-testing outweigh the risks or are not indicated as specified below and therefore the following is/are being waived:    [x] Pregnancy test   [] Patients LMP on-time and regular.   [] Patient had Tubal Ligation or has other Contraception Device. [] Patient  is Menopausal or Premenarcheal.    [x] Patient had Full or Partial Hysterectomy. [x] Protocol for Iodine allergy    [] MRI Questionnaire     [] BUN/Creatinine   [] Patient age w/no hx of renal dysfunction. [] Patient on Dialysis. [] Recent Normal Labs.   Electronically signed by Kemi Morales DO on 12/3/20 at 10:34 PM EST          PT tolerates contrast     Kemi Morales DO  [id number] ([facility]), DO  [id number]

## 2020-12-04 NOTE — CARE COORDINATION
ACM attempted to contact patient to follow up on her status after her ED visit on 12/03/20 and to start the enrollment assessment for Care Coordination. ACM left a voice message with contact information asking patient to return the call.      PLAN  Continue to attempt to contact patient

## 2020-12-07 ENCOUNTER — VIRTUAL VISIT (OUTPATIENT)
Dept: PRIMARY CARE CLINIC | Age: 45
End: 2020-12-07
Payer: MEDICAID

## 2020-12-07 PROCEDURE — 99214 OFFICE O/P EST MOD 30 MIN: CPT | Performed by: INTERNAL MEDICINE

## 2020-12-07 RX ORDER — TIZANIDINE 2 MG/1
2 TABLET ORAL 2 TIMES DAILY PRN
Qty: 180 TABLET | Refills: 2 | Status: SHIPPED | OUTPATIENT
Start: 2020-12-07 | End: 2021-03-07

## 2020-12-07 ASSESSMENT — ENCOUNTER SYMPTOMS
EYE ITCHING: 0
COLOR CHANGE: 0
SHORTNESS OF BREATH: 0
RHINORRHEA: 0
ANAL BLEEDING: 0
STRIDOR: 0
ABDOMINAL PAIN: 0
EYE DISCHARGE: 0
PHOTOPHOBIA: 0
CONSTIPATION: 0
DIARRHEA: 0
EYE PAIN: 0
COUGH: 1
WHEEZING: 0
FACIAL SWELLING: 0
TROUBLE SWALLOWING: 0
VOMITING: 0
NAUSEA: 0
SORE THROAT: 0
BLOOD IN STOOL: 0

## 2020-12-07 NOTE — PROGRESS NOTES
TELEHEALTH VIDEO VISIT    HPI:    Britney Underwood (:  1975) has requested an audio/video evaluation for the following concern(s):    Chief Complaint   Patient presents with    3 Month Follow-Up       Patient was in the ED on 12/3/2020 with shortness of breath. She said her O2 sats at home dropped down to about 84%. She was weak. She had a cough productive of whitish phlegm. She was checked for COVID-19 and this was negative. She also had a CTA which did not show any pulmonary infiltrates. She says she is doing better. She was discharged on Augmentin because she had an ear infection and apparently may also have had a sinus infection. She was also placed on Phenergan with codeine because of her [place] cough. I told her to take as little of this as possible as this could be addictive. Benzonatate did not work    She has a very long medical history which will not be repeated here. Right now we are focusing on her insulin requiring type 2 diabetes mellitus. She is taking 4 units of Lantus in the morning and 14 units in the evening. She also uses  Humalog per sliding scale before meals and at bedtime. 4 units regularly and then for every 50 mg over 100 she takes 2 units. She has had no low blood sugar spells. Her sugars have been high over 200 and she is going to switch to a different endocrinologist at [facility] by the name of Dr. Blanca Castellanos She has an appointment with her in about a month. We may have to switch her from propranolol to  metoprolol to avoid any hypoglycemic episodes that may be masked by propranolol    She needs a rheumatologist as she was told that  she may have scleroderma or some other type of connective tissue disorder. She is unable to find a rheumatologist who takes her insurance but she is to keep trying and let me know if she finds 1 and will refer her.   She has problems with pain in her neck so she takes tizanidine on a as needed basis for this  She patient is not nervous/anxious. Prior to Visit Medications    Medication Sig Taking? Authorizing Provider   tiZANidine (ZANAFLEX) 2 MG tablet Take 1 tablet by mouth 2 times daily as needed (muscle) Yes Vicenta Wallace,    promethazine-codeine (PHENERGAN WITH CODEINE) 6.25-10 MG/5ML syrup Take 5 mLs by mouth 4 times daily as needed for Cough for up to 3 days. Yes Poly Cooper DO   amoxicillin-clavulanate (AUGMENTIN) 875-125 MG per tablet Take 1 tablet by mouth 2 times daily for 10 days Yes REYNOLD Olivas CNP   benzonatate (TESSALON) 100 MG capsule Take 1 capsule by mouth 3 times daily as needed for Cough Yes REYNOLD Olivas CNP   methylPREDNISolone (MEDROL) 4 MG tablet daily Titration from pulmonologist. Yes Historical Provider, MD   Milk Thistle 150 MG CAPS Take by mouth 1 daily Yes Historical Provider, MD   Polyvinyl Alcohol-Povidone (REFRESH OP) Apply to eye Artificial tears Yes Historical Provider, MD   insulin glargine (LANTUS SOLOSTAR) 100 UNIT/ML injection pen Injects 4 units in the morning and 14 units at night Yes Historical Provider, MD   BENZONATATE PO 1 tid prn Yes Historical Provider, MD   ketorolac (TORADOL) 10 MG tablet Take 1 tablet by mouth every 6 hours as needed for Pain Yes Poly Cooper DO   Magnesium 500 MG TABS Take 1 tablet by mouth 2 times daily Yes Historical Provider, MD   insulin lispro, 1 Unit Dial, (HUMALOG KWIKPEN) 100 UNIT/ML SOPN Inject into the skin daily Sliding scale: 150-200- 4 units, then add 2 units for every 50 up to a max of 12 units.  Yes Historical Provider, MD   Probiotic Product (PROBIOTIC DAILY PO)  Yes Historical Provider, MD   polyethylene glycol (GLYCOLAX) 17 g packet Take 17 g by mouth 2 times daily as needed for Constipation Yes Vicenta Wallace DO   propranolol (INDERAL) 10 MG tablet Take 1 tablet by mouth 3 times daily Yes Vicenta Wallace DO   furosemide (LASIX) 20 MG tablet Take 1 tablet by mouth daily Yes Melinda Jeronimo, DO aspirin 81 MG EC tablet Take 81 mg by mouth daily Yes Historical Provider, MD   atorvastatin (LIPITOR) 10 MG tablet Take 1 tablet by mouth daily Yes REYNOLD Oh NP   levothyroxine (SYNTHROID) 50 MCG tablet Take 2.5 tablets by mouth Five times weekly Given Monday,Tuesday,Wednesday,Thursday,Friday Yes Vicenta Wallace DO   levothyroxine (SYNTHROID) 50 MCG tablet Take 2 tablets by mouth Twice a Week Given Saturday,Sunday Yes Vicenta Wallace DO   pantoprazole (PROTONIX) 40 MG tablet Take 1 tablet by mouth every morning (before breakfast) Yes Vicenta Wallace DO   folic acid (FOLVITE) 1 MG tablet Take 1 tablet by mouth Daily with lunch Yes Vicenta Wallace DO   acetaminophen (TYLENOL) 325 MG tablet Take 650 mg by mouth every 6 hours as needed for Pain Yes Historical Provider, MD   sodium chloride (OCEAN, BABY AYR) 0.65 % nasal spray 1 spray by Nasal route 4 times daily as needed for Congestion Yes Historical Provider, MD   azelastine (ASTELIN) 0.1 % nasal spray 1 spray by Nasal route 2 times daily Yes Historical Provider, MD   diphenhydrAMINE (BENYLIN) 12.5 MG/5ML liquid Take 25 mg by mouth 4 times daily as needed for Allergies (migrane break through) Yes Historical Provider, MD   Artificial Saliva (BIOTENE DRY MOUTH MOISTURIZING MT) Take 1 spray by mouth every 3 hours Yes Historical Provider, MD   clotrimazole (LOTRIMIN) 1 % cream Apply topically 2 times daily as needed Under breast and abdominal folds Yes Historical Provider, MD   OIL OF OREGANO PO Take 60 mg by mouth daily (with breakfast) Yes Historical Provider, MD   miconazole (MICOTIN) 2 % powder Apply topically daily Apply to groin, under breast and skin folds.  Yes Historical Provider, MD   magnesium hydroxide (MILK OF MAGNESIA) 400 MG/5ML suspension Take 30 mLs by mouth 4 times daily as needed for Constipation Yes Historical Provider, MD   SENNA LEAVES PO Take 470 mg by mouth three times daily  Yes Historical Provider, MD   promethazine (PHENERGAN) 25 MG tablet Take 25 mg by mouth every 6 hours as needed for Nausea Yes Historical Provider, MD   triamcinolone (KENALOG) 0.1 % cream Apply topically daily as needed (rash) Yes Historical Provider, MD   NONFORMULARY Take 2 capsules by mouth 2 times daily (with meals) Turmeric Ginger Yes Historical Provider, MD   levomilnacipran (FETZIMA) 40 MG CP24 extended release capsule Take 40 mg by mouth daily Yes Historical Provider, MD   Galcanezumab-gnlm (EMGALITY) 120 MG/ML SOAJ Inject 120 mg into the skin every 30 days  Yes Historical Provider, MD   clonazePAM (KLONOPIN) 0.5 MG tablet Take 0.5 mg by mouth 2 times daily. . Yes Historical Provider, MD   divalproex (DEPAKOTE) 250 MG DR tablet Take 500 mg by mouth See Admin Instructions 250 mg -  mg - Afternoon 500 mg - PM Yes Historical Provider, MD   econazole nitrate 1 % cream Apply topically daily as needed (Apply under breasts and folds)  Yes Historical Provider, MD   fluticasone (FLONASE) 50 MCG/ACT nasal spray 1 spray by Nasal route 2 times daily  Yes Historical Provider, MD   fluticasone-salmeterol (ADVAIR HFA) 230-21 MCG/ACT inhaler Inhale 2 puffs into the lungs 2 times daily Yes Historical Provider, MD   ipratropium-albuterol (DUONEB) 0.5-2.5 (3) MG/3ML SOLN nebulizer solution Inhale 1 vial into the lungs every 4 hours as needed for Shortness of Breath  Yes Historical Provider, MD   montelukast (SINGULAIR) 10 MG tablet Take 10 mg by mouth nightly Yes Historical Provider, MD   ondansetron (ZOFRAN-ODT) 8 MG TBDP disintegrating tablet Take 8 mg by mouth every 8 hours as needed for Nausea or Vomiting  Yes Historical Provider, MD   rOPINIRole (REQUIP) 0.5 MG tablet Take 1 tablet by mouth See Admin Instructions 1 tablet - breakfast  2 tablets - supper  Patient taking differently: Take 0.5 mg by mouth See Admin Instructions 1 tablet qAM and 2 tablets qPM  Vicenta Wallace, DO   potassium chloride (KLOR-CON) 20 MEQ packet Take 20 mEq by mouth daily  Ester Ma,

## 2020-12-08 ENCOUNTER — TELEPHONE (OUTPATIENT)
Dept: PRIMARY CARE CLINIC | Age: 45
End: 2020-12-08

## 2020-12-08 NOTE — TELEPHONE ENCOUNTER
Please resend orders for glucometer and diabetic supplies for both Lakesha Antonio and Zeus Antunez to [facility] in [place].   [id number] does not take their insurance

## 2020-12-17 RX ORDER — GLUCOSAMINE HCL/CHONDROITIN SU 500-400 MG
CAPSULE ORAL
Qty: 260 STRIP | Refills: 1 | Status: SHIPPED | OUTPATIENT
Start: 2020-12-17 | End: 2021-02-25

## 2020-12-17 RX ORDER — BLOOD-GLUCOSE METER
1 KIT MISCELLANEOUS DAILY
Qty: 1 KIT | Refills: 0 | Status: SHIPPED | OUTPATIENT
Start: 2020-12-17 | End: 2021-02-25

## 2020-12-17 RX ORDER — LANCETS 30 GAUGE
1 EACH MISCELLANEOUS 4 TIMES DAILY
Qty: 200 EACH | Refills: 0 | Status: SHIPPED | OUTPATIENT
Start: 2020-12-17 | End: 2021-02-02

## 2020-12-22 ENCOUNTER — APPOINTMENT (OUTPATIENT)
Dept: GENERAL RADIOLOGY | Age: 45
End: 2020-12-22
Payer: MEDICAID

## 2020-12-22 ENCOUNTER — TELEPHONE (OUTPATIENT)
Dept: PRIMARY CARE CLINIC | Age: 45
End: 2020-12-22

## 2020-12-22 ENCOUNTER — HOSPITAL ENCOUNTER (EMERGENCY)
Age: 45
Discharge: HOME | End: 2020-12-22
Payer: MEDICAID

## 2020-12-22 VITALS
RESPIRATION RATE: 16 BRPM | HEIGHT: 62 IN | SYSTOLIC BLOOD PRESSURE: 105 MMHG | BODY MASS INDEX: 34.6 KG/M2 | TEMPERATURE: 96.9 F | WEIGHT: 188 LBS | OXYGEN SATURATION: 98 % | HEART RATE: 89 BPM | DIASTOLIC BLOOD PRESSURE: 61 MMHG

## 2020-12-22 PROCEDURE — 73110 X-RAY EXAM OF WRIST: CPT

## 2020-12-22 PROCEDURE — 6370000000 HC RX 637 (ALT 250 FOR IP): Performed by: PHYSICIAN ASSISTANT

## 2020-12-22 PROCEDURE — 99284 EMERGENCY DEPT VISIT MOD MDM: CPT

## 2020-12-22 PROCEDURE — 73130 X-RAY EXAM OF HAND: CPT

## 2020-12-22 RX ORDER — IBUPROFEN 400 MG/1
600 TABLET ORAL ONCE
Status: COMPLETED | OUTPATIENT
Start: 2020-12-22 | End: 2020-12-22

## 2020-12-22 RX ORDER — IBUPROFEN 800 MG/1
800 TABLET ORAL 2 TIMES DAILY PRN
Qty: 20 TABLET | Refills: 0 | Status: SHIPPED | OUTPATIENT
Start: 2020-12-22 | End: 2021-01-26 | Stop reason: ALTCHOICE

## 2020-12-22 RX ADMIN — IBUPROFEN 600 MG: 400 TABLET, FILM COATED ORAL at 19:11

## 2020-12-22 ASSESSMENT — PAIN SCALES - GENERAL
PAINLEVEL_OUTOF10: 7
PAINLEVEL_OUTOF10: 8

## 2020-12-22 NOTE — TELEPHONE ENCOUNTER
Patient is calling to ask what hospital she should go to, I suggested to go to [address] in [facility]. Patient states she always went to downtown [address]. So I suggested she go there.

## 2020-12-22 NOTE — ED PROVIDER NOTES
[facility]  Department of Emergency Medicine   ED  Encounter Note  Admit Date/RoomTime: 2020  5:50 PM  ED Room: [address]    NAME: Misha Del Castillo  : 1975  MRN: [id number]     Chief Complaint:  Hand Injury (Punched wall , having worse pain and increased swelling. )    History of Present Illness       Misha Del Castillo is a 39 y.o. old female presenting to the emergency department by private vehicle, for traumatic Right hand and wrist pain which occured 2 day(s) prior to arrival.  The complaint is due to a direct blow to the injured area while at home}. Patient states \"I was very angry therefore I punched a wall on  which I have not done in some time. \"  Patient states she did have it x-rayed 2 days ago when it was really swollen and was negative. Patient states she went to her family doctor who told her to come back and get a samson-ray due to the amount of pain she was having due to the swelling being down now. She is right handed. Patient has no prior history of pain/injury with regards to today's visit. The patients tetanus status is up to date. Since onset the symptoms have been improving. Her pain is aggraveated by any movement and relieved by nothing, as no treatment has been provided prior to this visit. ROS   Pertinent positives and negatives are stated within HPI, all other systems reviewed and are negative.     Past Medical History:  has a past medical history of Abnormal Pap smear of cervix, Anemia, Anxiety, Asthma, Blind, Connective tissue disease ([address] Cyst of right breast, DDD (degenerative disc disease), cervical, Depression, Diabetes mellitus ([address] Dysphagia, Fibromyalgia, Gastroparesis, GERD (gastroesophageal reflux disease), Headache, Hematuria, Hyperlipidemia, Hypertension, Hypothyroidism, IBS (irritable bowel syndrome), Immune deficiency disorder ([address] Insomnia, Kidney stones, Meningitis, PAULINE on CPAP, PCOS (polycystic ovarian syndrome), POTS (postural orthostatic tachycardia syndrome), Pseudotumor cerebri, Raynaud's disease, Rectal bleeding, RLS (restless legs syndrome), Scleroderma ([address] Sjogren's disease ([address] Thyroid nodule, and TIA (transient ischemic attack). Surgical History:  has a past surgical history that includes Hysterectomy; Cholecystectomy; Appendectomy; Carpal tunnel release (Bilateral); Port Surgery; and Pyloroplasty. Social History:  reports that she has never smoked. She has never used smokeless tobacco. She reports that she does not drink alcohol or use drugs. Family History: family history includes Alzheimer's Disease in her father, mother, paternal aunt, and paternal uncle; Hypertension in her brother; Osteoporosis in her sister; Stroke in her sister. Allergies: Diamox [acetazolamide], Abilify [aripiprazole], Aspartame and phenylalanine, Cymbalta [duloxetine hcl], Darvon [propoxyphene], Dilaudid [hydromorphone hcl], Doxepin, Doxycycline, Duloxetine, Effexor [venlafaxine], Gluten meal, Grapeseed extract [nutritional supplements], Hydrochlorothiazide, Iodine, Iron, Levaquin [levofloxacin in d5w], Meclizine, Milk-related compounds, Other, Pecan nut (diagnostic), Prochlorperazine, Red dye, Reglan [metoclopramide], Rocephin [ceftriaxone], Rosemary oil, Sulfa antibiotics, Tetracyclines & related, Topamax [topiramate], Tramadol, Triptans, Chapar[place] [macadamia nut oil], Zithromax [azithromycin], Adhesive tape, Betamethasone, and Wheat bran    Physical Exam   Oxygen Saturation Interpretation: Normal.        ED Triage Vitals   BP Temp Temp src Pulse Resp SpO2 Height Weight   12/22/20 1730 12/22/20 1722 -- 12/22/20 1722 12/22/20 1722 12/22/20 1722 12/22/20 1730 12/22/20 1730   (!) 182/122 96.9 °F (36.1 °C)  88 18 97 % 5' 2\" (1.575 m) 188 lb (85.3 kg)         Constitutional:  Alert, development consistent with age. Neck:  Normal ROM. Supple. Non-tender.   Hand: Right dorsal 4th, 5th proximal aspect  Proximal Phalanx and Metacarpal .              Tenderness: moderate. Swelling: Mild. Deformity: no.               Skin:  no erythema, rash or wounds noted. No pain noted over anatomic snuffbox. Patient does have some movement. Mild swelling noted. No obvious deformity. Patient neurovascular and sensory intact and has a good radial pulse       Neurovascular: Motor deficit: none. Sensory deficit:   none. Pulse deficit: none. Capillary refill: normal.  Fingers:  4th, 5th            Tenderness:  mild. Swelling: Mild. Deformity: no.              ROM: full range with pain. Skin:  no erythema, rash or wounds noted. No pain noted with palpation of fingers. Patient had good cap refill. Patient is neurovascular and sensory intact  Wrist:               Tenderness:  mild. Swelling: Mild. Deformity: no.             ROM: diminished range with pain. Skin:  no erythema, rash or wounds noted. Lymphatics: No lymphangitis or adenopathy noted. Neurological:  Oriented. Motor functions intact. t. Lab / Imaging Results   (All laboratory and radiology results have been personally reviewed by myself)  Labs:  No results found for this visit on 12/22/20. Imaging: All Radiology results interpreted by Radiologist unless otherwise noted.   XR WRIST RIGHT (MIN 3 VIEWS)   Final Result   No acute fracture or dislocation of the right hand or wrist.      XR HAND RIGHT (MIN 3 VIEWS)   Final Result   No acute fracture or dislocation of the right hand or wrist.        ED Course / Medical Decision Making     Medications   ibuprofen (ADVIL;MOTRIN) tablet 600 mg (has no administration in time range)        Consult(s):   None    Procedure(s):   none    MDM:    Patient is a 44-year-old female presented to the emergency department for right hand pain after punching a wall due to aggression and anger on Medications     New Prescriptions    IBUPROFEN (ADVIL;MOTRIN) 800 MG TABLET    Take 1 tablet by mouth 2 times daily as needed for Pain     Electronically signed by Yadiel Boswell PA-C   DD: 12/22/20  **This report was transcribed using voice recognition software. Every effort was made to ensure accuracy; however, inadvertent computerized transcription errors may be present.   END OF ED PROVIDER NOTE       Yadiel Boswell PA-C  12/22/20 [id number]

## 2020-12-23 ENCOUNTER — TELEPHONE (OUTPATIENT)
Dept: ADMINISTRATIVE | Age: 45
End: 2020-12-23

## 2020-12-23 ENCOUNTER — CARE COORDINATION (OUTPATIENT)
Dept: CARE COORDINATION | Age: 45
End: 2020-12-23

## 2020-12-23 SDOH — ECONOMIC STABILITY: TRANSPORTATION INSECURITY
IN THE PAST 12 MONTHS, HAS THE LACK OF TRANSPORTATION KEPT YOU FROM MEDICAL APPOINTMENTS OR FROM GETTING MEDICATIONS?: YES

## 2020-12-23 SDOH — SOCIAL STABILITY: SOCIAL NETWORK: ARE YOU MARRIED, WIDOWED, DIVORCED, SEPARATED, NEVER MARRIED, OR LIVING WITH A PARTNER?: MARRIED

## 2020-12-23 SDOH — SOCIAL STABILITY: SOCIAL NETWORK
DO YOU BELONG TO ANY CLUBS OR ORGANIZATIONS SUCH AS CHURCH GROUPS UNIONS, FRATERNAL OR ATHLETIC GROUPS, OR SCHOOL GROUPS?: NO

## 2020-12-23 SDOH — SOCIAL STABILITY: SOCIAL NETWORK: IN A TYPICAL WEEK, HOW MANY TIMES DO YOU TALK ON THE PHONE WITH FAMILY, FRIENDS, OR NEIGHBORS?: THREE TIMES A WEEK

## 2020-12-23 SDOH — SOCIAL STABILITY: SOCIAL NETWORK: HOW OFTEN DO YOU ATTEND CHURCH OR RELIGIOUS SERVICES?: MORE THAN 4 TIMES PER YEAR

## 2020-12-23 SDOH — ECONOMIC STABILITY: INCOME INSECURITY: HOW HARD IS IT FOR YOU TO PAY FOR THE VERY BASICS LIKE FOOD, HOUSING, MEDICAL CARE, AND HEATING?: NOT VERY HARD

## 2020-12-23 SDOH — SOCIAL STABILITY: SOCIAL NETWORK: HOW OFTEN DO YOU ATTENT MEETINGS OF THE CLUB OR ORGANIZATION YOU BELONG TO?: NEVER

## 2020-12-23 SDOH — HEALTH STABILITY: MENTAL HEALTH: HOW OFTEN DO YOU HAVE A DRINK CONTAINING ALCOHOL?: NEVER

## 2020-12-23 SDOH — HEALTH STABILITY: PHYSICAL HEALTH: ON AVERAGE, HOW MANY MINUTES DO YOU ENGAGE IN EXERCISE AT THIS LEVEL?: 20 MIN

## 2020-12-23 SDOH — HEALTH STABILITY: MENTAL HEALTH
STRESS IS WHEN SOMEONE FEELS TENSE, NERVOUS, ANXIOUS, OR CAN'T SLEEP AT NIGHT BECAUSE THEIR MIND IS TROUBLED. HOW STRESSED ARE YOU?: TO SOME EXTENT

## 2020-12-23 SDOH — SOCIAL STABILITY: SOCIAL NETWORK: HOW OFTEN DO YOU GET TOGETHER WITH FRIENDS OR RELATIVES?: NEVER

## 2020-12-23 SDOH — HEALTH STABILITY: PHYSICAL HEALTH: ON AVERAGE, HOW MANY DAYS PER WEEK DO YOU ENGAGE IN MODERATE TO STRENUOUS EXERCISE (LIKE A BRISK WALK)?: 7 DAYS

## 2020-12-23 SDOH — ECONOMIC STABILITY: TRANSPORTATION INSECURITY
IN THE PAST 12 MONTHS, HAS LACK OF TRANSPORTATION KEPT YOU FROM MEETINGS, WORK, OR FROM GETTING THINGS NEEDED FOR DAILY LIVING?: NO

## 2020-12-23 SDOH — ECONOMIC STABILITY: FOOD INSECURITY: WITHIN THE PAST 12 MONTHS, THE FOOD YOU BOUGHT JUST DIDN'T LAST AND YOU DIDN'T HAVE MONEY TO GET MORE.: NEVER TRUE

## 2020-12-23 SDOH — ECONOMIC STABILITY: FOOD INSECURITY: WITHIN THE PAST 12 MONTHS, YOU WORRIED THAT YOUR FOOD WOULD RUN OUT BEFORE YOU GOT MONEY TO BUY MORE.: NEVER TRUE

## 2020-12-23 NOTE — TELEPHONE ENCOUNTER
Transferred from [facility]    Judy RN [facility] states pt needs ED follow up within next 7 days. Per Judy, pt punched a wall injuring her hand. She was treated at [facility] on 12/22 , where she was also given instruction to schedule with PCP within the next 7 days.       Pt scheduled 12/28/20 @1:30PM

## 2020-12-23 NOTE — TELEPHONE ENCOUNTER
Call placed to pt, no answer. VM left informing her of recommendations per Bee Curtis PA-C. Callback number provided for any questions. Trino Christie

## 2020-12-23 NOTE — TELEPHONE ENCOUNTER
ED visit: 12/23/20 (hosea)  Injury: punched a wall Sunday    XR wrist right  Impression   No acute fracture or dislocation of the right hand or wrist.     XR hand right  Impression   No acute fracture or dislocation of the right hand or wrist.       Per ED note, \"Patient is a 44-year-old female presented to the emergency department for right hand pain after punching a wall due to aggression and anger on Sunday. Imaging was obtained based on moderate suspicion for fracture, dislocation as per history/physical findings. No acute fracture dislocation was noted. Patient was educated she most likely has a hand contusion. Patient was told to use rest, ice elevation. Patient is requesting a wrist brace. Patient will be given 1 through [facility] orthopedic supplies. Patient was educated that she should only use a wrist brace when up and moving around when she is resting she should take it off and move her wrist around to prevent a frozen joint. Patient was told that she should alternate Tylenol ibuprofen every 6-8 hours with food. Patient was given the name of an orthopedic. Patient was explained it can take weeks to start to feel better. Patient has no pain elicited over the anatomic snuffbox and is neurovascular and sensory intact has good cap refill and good radial pulse. Patient voiced understanding agree with the plan and management. Plan is subsequently for symptom control, limited use and  immobilization with appropriate outpatient follow-up. \"    Assessment       1. Injury of right hand, initial encounter    2. Contusion of right hand including fingers, initial encounter    3.  Right hand pain

## 2020-12-23 NOTE — CARE COORDINATION
Ambulatory Care Coordination Note  12/23/2020  CM Risk Score: 2  Charlson 10 Year Mortality Risk Score: 47%     ACC: Joshua Garcia, JAYSON    Summary Note:   ACM contacted patient to review ED visit and complete enrollment into Care Coordination. Patient lives in a ranch style home with her . There is one step to enter and 12 steps to the basement, where the laundry is done. 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 She does need assistance with some ADLS and IADLS ( due to visually impaired) which she receives from her . Patient does have a walker, quad cane, rollator, and wheel chair and utilizes this equipment on an as needed basis. Patient does follow with pulmonology (Dr. Sary Hogue) and optometry (Dr. Christine Mcnulty) from [facility] in [facility]. Patient's PCP is Dr. Jes Mendoza and her pharmacy is [facility] in [place]. Patient states she may benefit from receiving her medications in pre-packaged bubble packs in the mail. ACM did contact the pre-service center Sanjana Beaver to assist patient in scheduling an ED F/U appointment with her PCP. DM  -See assessment  -Patient does monitor and document her blood sugar readings consistently with her husbands assistance.   -Patient is in the process of trying to obtain approval for a CGM from her insurance company.   -Patient states that she has a 2 month old puppy that will eventually be trained to be her support/guide dog. The [place] with assist in training when the puppy has learned the basic skills.   -Patient does follow with an Endocrinologist from [facility] in [facility] [place].   -Patient is up to date on her optometry visits.   -Patient follows with Dr. Ludwin Dean, Podiatrist in [place] for her foot care. Her next appointment is scheduled for 01/08/2021.  -Patient would like a referral to the RD to discuss dietary management of her DM.    -Patient is not familiar with the DM Zone tool.      GENERAL -Patient feels she needs a new C-Pap machine. She states she has trouble sleeping d/t receiving too much air from the machine. Patient follows with Dr. Alicia Agarwal at [facility] in [place]. She is going to contact this provider to discuss a referral for a sleep study. PLAN  -Continue Care Coordination  -Refer to RD  -Refer to [facility] to discuss receiving her medications in the mail.   -Send patient DM Zone tool per My Chart, and continue education on this tool. -F/U on status of CGM. Ambulatory Care Coordination Assessment    Care Coordination Protocol  Program Enrollment: Complex Care  Referral from Primary Care Provider: Yes  Week 1 - Initial Assessment     Do you have all of your prescriptions and are they filled?: Yes  Barriers to medication adherence: None  Are you able to afford your medications?: Yes  How often do you have trouble taking your medications the way you have been told to take them?: I always take them as prescribed. Do you have Home O2 Therapy?: No      Ability to seek help/take action for Emergent Urgent situations i.e. fire, crime, inclement weather or health crisis. : Independent  Ability to ambulate to restroom: Independent  Ability handle personal hygeine needs (bathing/dressing/grooming): Needs Assistance  Ability to manage Medications: Dependent  Ability to prepare Food Preparation: Needs Assistance  Ability to maintain home (clean home, laundry): Dependent  Ability to drive and/or has transportation: Dependent  Ability to do shopping: Needs Assistance  Ability to manage finances: Needs Assistance  Is patient able to live independently?: No     Current Housing: Private Residence        Per the Fall Risk Screening, did the patient have 2 or more falls or 1 fall with injury in the past year?: No     Frequent urination at night?: Yes  Do you use rails/bars?: No  Do you have a non-slip tub mat?: Yes     Are you experiencing loss of meaning?: No Are you experiencing loss of hope and peace?: No     Thinking about your patient's physical health needs, are there any symptoms or problems (risk indicators) you are unsure about that require further investigation?: No identified areas of uncertainly or problems already being investigated   Are the patients physical health problems impacting on their mental well-being?: No identified areas of concern   Are there any problems with your patients lifestyle behaviors (alcohol, drugs, diet, exercise) that are impacting on physical or mental well-being?: No identified areas of concern   Do you have any other concerns about your patients mental well-being?  How would you rate their severity and impact on the patient?: Mild problems - don't interfere with function   How would you rate their home environment in terms of safety and stability (including domestic violence, insecure housing, neighbor harassment)?: Consistently safe, supportive, stable, no identified problems   How do daily activities impact on the patient's well-being? (include current or anticipated unemployment, work, caregiving, access to transportation or other): No identified problems or perceived positive benefits   How would you rate their social network (family, work, friends)?: Restricted participation with some degree of social isolation   How would you rate their financial resources (including ability to afford all required medical care)?: Financially secure, some resource challenges   How wells does the patient now understand their health and well-being (symptoms, signs or risk factors) and what they need to do to manage their health?: Reasonable to good understanding and already engages in managing health or is willing to undertake better management How well do you think your patient can engage in healthcare discussions? (Barriers include language, deafness, aphasia, alcohol or drug problems, learning difficulties, concentration): Clear and open communication, no identified barriers   Do other services need to be involved to help this patient?: Other care/services not in place and required   Suggested Interventions and [employer] [facility]: In Process (Comment: 12/23/20: Patient follows with Dr. Erick Miguel with [facility], patient also has a counselor)     [employer] or Pill Pack: In Process   Pharmacist: In Process   Registered Dietician: In Process   Zone Management Tools: In Process         Schedule an appointment with the patient's PCP, Set up/Review an Education Plan, Set up/Review Goals              Prior to Admission medications    Medication Sig Start Date End Date Taking? Authorizing Provider   ibuprofen (ADVIL;MOTRIN) 800 MG tablet Take 1 tablet by mouth 2 times daily as needed for Pain 12/22/20 1/1/21  Destiny Rosa PA-C   glucose monitoring kit (FREESTYLE) monitoring kit 1 kit by Does not apply route daily 12/17/20   Vicenta Wallace, DO   blood glucose monitor strips Test 4 times a day & as needed for symptoms of irregular blood glucose. Dispense sufficient amount for indicated testing frequency plus additional to accommodate PRN testing needs.  12/17/20   Vicenta Wallace, DO   Lancets MISC 1 each by Does not apply route 4 times daily 12/17/20   Vicenta Wallace DO   tiZANidine (ZANAFLEX) 2 MG tablet Take 1 tablet by mouth 2 times daily as needed (muscle) 12/7/20 3/7/21  Vicenta Wallace DO   benzonatate (TESSALON) 100 MG capsule Take 1 capsule by mouth 3 times daily as needed for Cough 12/1/20 12/31/20  Ary Bowen, APRN - CNP   methylPREDNISolone (MEDROL) 4 MG tablet daily Titration from pulmonologist.    Historical Provider, MD   Milk Thistle 150 MG CAPS Take by mouth 1 daily    Historical Provider, MD Polyvinyl Alcohol-Povidone (REFRESH OP) Apply to eye Artificial tears    Historical Provider, MD   insulin glargine (LANTUS SOLOSTAR) 100 UNIT/ML injection pen Injects 4 units in the morning and 14 units at night 11/10/20   Historical Provider, MD   BENZONATATE PO 1 tid prn 11/4/20   Historical Provider, MD   ketorolac (TORADOL) 10 MG tablet Take 1 tablet by mouth every 6 hours as needed for Pain 10/19/20   Karl Gitelman, DO   Magnesium 500 MG TABS Take 1 tablet by mouth 2 times daily    Historical Provider, MD   insulin lispro, 1 Unit Dial, (HUMALOG KWIKPEN) 100 UNIT/ML SOPN Inject into the skin daily Sliding scale: 150-200- 4 units, then add 2 units for every 50 up to a max of 12 units.     Historical Provider, MD   Probiotic Product (PROBIOTIC DAILY PO)     Historical Provider, MD   polyethylene glycol (GLYCOLAX) 17 g packet Take 17 g by mouth 2 times daily as needed for Constipation 10/5/20   Kelly Puentes DO   propranolol (INDERAL) 10 MG tablet Take 1 tablet by mouth 3 times daily 10/5/20   Vicenta Wallace DO   furosemide (LASIX) 20 MG tablet Take 1 tablet by mouth daily 10/5/20   Vicenta Wallace DO   aspirin 81 MG EC tablet Take 81 mg by mouth daily    Historical Provider, MD   atorvastatin (LIPITOR) 10 MG tablet Take 1 tablet by mouth daily 9/4/20   [employer] Stacy, APRN - NP   rOPINIRole (REQUIP) 0.5 MG tablet Take 1 tablet by mouth See Admin Instructions 1 tablet - breakfast  2 tablets - supper  Patient taking differently: Take 0.5 mg by mouth See Admin Instructions 1 tablet qAM and 2 tablets qPM 9/1/20 11/30/20  Vicenta Wallace DO   levothyroxine (SYNTHROID) 50 MCG tablet Take 2.5 tablets by mouth Five times weekly Given Monday,Tuesday,Wednesday,Thursday,Friday 9/1/20   Vicenta Wallace DO   levothyroxine (SYNTHROID) 50 MCG tablet Take 2 tablets by mouth Twice a Week Given Saturday,Juwan 9/3/20   Kelly Puentes DO pantoprazole (PROTONIX) 40 MG tablet Take 1 tablet by mouth every morning (before breakfast) 9/1/20   Vicenta Wallace DO   folic acid (FOLVITE) 1 MG tablet Take 1 tablet by mouth Daily with lunch 9/1/20   Vicenta Wallace DO   acetaminophen (TYLENOL) 325 MG tablet Take 650 mg by mouth every 6 hours as needed for Pain    Historical Provider, MD   sodium chloride (OCEAN, BABY AYR) 0.65 % nasal spray 1 spray by Nasal route 4 times daily as needed for Congestion    Historical Provider, MD   azelastine (ASTELIN) 0.1 % nasal spray 1 spray by Nasal route 2 times daily    Historical Provider, MD   diphenhydrAMINE (BENYLIN) 12.5 MG/5ML liquid Take 25 mg by mouth 4 times daily as needed for Allergies (migrane break through)    Historical Provider, MD   Artificial Saliva (BIOTENE DRY MOUTH MOISTURIZING MT) Take 1 spray by mouth every 3 hours    Historical Provider, MD   clotrimazole (LOTRIMIN) 1 % cream Apply topically 2 times daily as needed Under breast and abdominal folds    Historical Provider, MD   OIL OF OREGANO PO Take 60 mg by mouth daily (with breakfast)    Historical Provider, MD   miconazole (MICOTIN) 2 % powder Apply topically daily Apply to groin, under breast and skin folds.     Historical Provider, MD   magnesium hydroxide (MILK OF MAGNESIA) 400 MG/5ML suspension Take 30 mLs by mouth 4 times daily as needed for Constipation    Historical Provider, MD   SENNA LEAVES PO Take 470 mg by mouth three times daily     Historical Provider, MD   promethazine (PHENERGAN) 25 MG tablet Take 25 mg by mouth every 6 hours as needed for Nausea    Historical Provider, MD   triamcinolone (KENALOG) 0.1 % cream Apply topically daily as needed (rash)    Historical Provider, MD   NONFORMULARY Take 2 capsules by mouth 2 times daily (with meals) Turmeric Ginger    Historical Provider, MD   potassium chloride (KLOR-CON) 20 MEQ packet Take 20 mEq by mouth daily 8/19/20 11/17/20  Christiano Carmen DO levomilnacipran (FETZIMA) 40 MG CP24 extended release capsule Take 40 mg by mouth daily    Historical Provider, MD   Galcanezumab-gnlm (EMGALITY) 120 MG/ML SOAJ Inject 120 mg into the skin every 30 days  11/18/19   Historical Provider, MD   clonazePAM (KLONOPIN) 0.5 MG tablet Take 0.5 mg by mouth 2 times daily. Heddy  Historical Provider, MD   divalproex (DEPAKOTE) 250 MG DR tablet Take 500 mg by mouth See Admin Instructions 250 mg -  mg - Afternoon 500 mg - PM 4/16/17   Historical Provider, MD   econazole nitrate 1 % cream Apply topically daily as needed (Apply under breasts and folds)     Historical Provider, MD   fluticasone (FLONASE) 50 MCG/ACT nasal spray 1 spray by Nasal route 2 times daily     Historical Provider, MD   fluticasone-salmeterol (ADVAIR HFA) 230-21 MCG/ACT inhaler Inhale 2 puffs into the lungs 2 times daily    Historical Provider, MD   ipratropium-albuterol (DUONEB) 0.5-2.5 (3) MG/3ML SOLN nebulizer solution Inhale 1 vial into the lungs every 4 hours as needed for Shortness of Breath     Historical Provider, MD   montelukast (SINGULAIR) 10 MG tablet Take 10 mg by mouth nightly    Historical Provider, MD   ondansetron (ZOFRAN-ODT) 8 MG TBDP disintegrating tablet Take 8 mg by mouth every 8 hours as needed for Nausea or Vomiting     Historical Provider, MD       Future Appointments   Date Time Provider Alisa Pike   12[facility]28/2020  [facility]   2/23/2021  3:20 PM REYNOLD Chatterjee - NP [facility]   3/11/2021  9:30 AM Vicenta Chan DO [facility]      and   Diabetes Assessment    Medic Alert ID: No  Meal Planning: None   How often do you test your blood sugar?: Daily, Meals, Bedtime   Do you have barriers with adherence to non-pharmacologic self-management interventions?  (Nutrition/Exercise/Self-Monitoring): No   Have you ever had to go to the ED for symptoms of low blood sugar?: No       No patient-reported symptoms Do you have hyperglycemia symptoms?: No   Do you have hypoglycemia symptoms?: No   Last Blood Sugar Value: 195   Blood Sugar Monitoring Regimen: Morning Fasting, Before Meals, At Bedtime   Blood Sugar Trends: Fluctuating

## 2020-12-23 NOTE — LETTER
12/23/2020    Ever Doherty  [facility] [address] [facility]    Dear Ever Doherty,    I enjoyed speaking with you and wanted to send some additional information. Pleasant Ring, DO and I will work together to ensure your needs are met and help you achieve your health goals. We are committed to walk with you on this journey and look forward to working with you. Please feel free to contact me with any questions or concerns. I am available by phone.  You can reach me at [phone]      In good health,     Brinda Galdamez RN

## 2020-12-23 NOTE — TELEPHONE ENCOUNTER
pt was at er last night, for right hand and wrist numbness, needs an appt asap please contact pt at [phone] thank you

## 2020-12-23 NOTE — TELEPHONE ENCOUNTER
Patient had xrays per ED note prior to yesterdays visit, has had negative XR x 2.  Would recommend follow up with PCP, if concerned for occult fracture could order CT or MRI without contrast to evaluate for fracture and if positive can refer to ortho  Electronically signed by Saira Godoy PA-C on 12/23/2020 at 12:42 PM

## 2020-12-28 ENCOUNTER — OFFICE VISIT (OUTPATIENT)
Dept: PRIMARY CARE CLINIC | Age: 45
End: 2020-12-28
Payer: MEDICAID

## 2020-12-28 VITALS
BODY MASS INDEX: 35.88 KG/M2 | TEMPERATURE: 97.5 F | HEART RATE: 87 BPM | SYSTOLIC BLOOD PRESSURE: 128 MMHG | DIASTOLIC BLOOD PRESSURE: 72 MMHG | HEIGHT: 62 IN | WEIGHT: 195 LBS

## 2020-12-28 PROBLEM — S63.501D WRIST SPRAIN, RIGHT, SUBSEQUENT ENCOUNTER: Status: ACTIVE | Noted: 2020-12-28

## 2020-12-28 PROCEDURE — G8427 DOCREV CUR MEDS BY ELIG CLIN: HCPCS | Performed by: INTERNAL MEDICINE

## 2020-12-28 PROCEDURE — 1036F TOBACCO NON-USER: CPT | Performed by: INTERNAL MEDICINE

## 2020-12-28 PROCEDURE — G8417 CALC BMI ABV UP PARAM F/U: HCPCS | Performed by: INTERNAL MEDICINE

## 2020-12-28 PROCEDURE — 99213 OFFICE O/P EST LOW 20 MIN: CPT | Performed by: INTERNAL MEDICINE

## 2020-12-28 PROCEDURE — G8482 FLU IMMUNIZE ORDER/ADMIN: HCPCS | Performed by: INTERNAL MEDICINE

## 2020-12-28 RX ORDER — POLYETHYLENE GLYCOL 3350 17 G/17G
17 POWDER, FOR SOLUTION ORAL 2 TIMES DAILY PRN
Qty: 527 G | Refills: 2 | Status: SHIPPED | OUTPATIENT
Start: 2020-12-28

## 2020-12-28 ASSESSMENT — ENCOUNTER SYMPTOMS
CHEST TIGHTNESS: 0
COUGH: 1
SHORTNESS OF BREATH: 0

## 2020-12-28 NOTE — PROGRESS NOTES
(ADVIL;MOTRIN) 800 MG tablet, Take 1 tablet by mouth 2 times daily as needed for Pain, Disp: 20 tablet, Rfl: 0    glucose monitoring kit (FREESTYLE) monitoring kit, 1 kit by Does not apply route daily, Disp: 1 kit, Rfl: 0    blood glucose monitor strips, Test 4 times a day & as needed for symptoms of irregular blood glucose. Dispense sufficient amount for indicated testing frequency plus additional to accommodate PRN testing needs. , Disp: 260 strip, Rfl: 1    Lancets MISC, 1 each by Does not apply route 4 times daily, Disp: 200 each, Rfl: 0    tiZANidine (ZANAFLEX) 2 MG tablet, Take 1 tablet by mouth 2 times daily as needed (muscle), Disp: 180 tablet, Rfl: 2    benzonatate (TESSALON) 100 MG capsule, Take 1 capsule by mouth 3 times daily as needed for Cough, Disp: 30 capsule, Rfl: 0    methylPREDNISolone (MEDROL) 4 MG tablet, daily Titration from pulmonologist., Disp: , Rfl:     Milk Thistle 150 MG CAPS, Take by mouth 1 daily, Disp: , Rfl:     Polyvinyl Alcohol-Povidone (REFRESH OP), Apply to eye Artificial tears, Disp: , Rfl:     insulin glargine (LANTUS SOLOSTAR) 100 UNIT/ML injection pen, Injects 4 units in the morning and 14 units at night, Disp: , Rfl:     BENZONATATE PO, 1 tid prn, Disp: , Rfl:     ketorolac (TORADOL) 10 MG tablet, Take 1 tablet by mouth every 6 hours as needed for Pain, Disp: 20 tablet, Rfl: 0    Magnesium 500 MG TABS, Take 1 tablet by mouth 2 times daily, Disp: , Rfl:     insulin lispro, 1 Unit Dial, (HUMALOG KWIKPEN) 100 UNIT/ML SOPN, Inject into the skin daily Sliding scale: 150-200- 4 units, then add 2 units for every 50 up to a max of 12 units. , Disp: , Rfl:     Probiotic Product (PROBIOTIC DAILY PO), , Disp: , Rfl:     propranolol (INDERAL) 10 MG tablet, Take 1 tablet by mouth 3 times daily, Disp: 90 tablet, Rfl: 3    furosemide (LASIX) 20 MG tablet, Take 1 tablet by mouth daily, Disp: 60 tablet, Rfl: 3    aspirin 81 MG EC tablet, Take 81 mg by mouth daily, Disp: , Rfl:   atorvastatin (LIPITOR) 10 MG tablet, Take 1 tablet by mouth daily, Disp: 90 tablet, Rfl: 1    levothyroxine (SYNTHROID) 50 MCG tablet, Take 2.5 tablets by mouth Five times weekly Given Monday,Tuesday,Wednesday,Thursday,Friday, Disp: 90 tablet, Rfl: 1    levothyroxine (SYNTHROID) 50 MCG tablet, Take 2 tablets by mouth Twice a Week Given Saturday,Sunday, Disp: 24 tablet, Rfl: 1    pantoprazole (PROTONIX) 40 MG tablet, Take 1 tablet by mouth every morning (before breakfast), Disp: 90 tablet, Rfl: 1    folic acid (FOLVITE) 1 MG tablet, Take 1 tablet by mouth Daily with lunch, Disp: 90 tablet, Rfl: 1    acetaminophen (TYLENOL) 325 MG tablet, Take 650 mg by mouth every 6 hours as needed for Pain, Disp: , Rfl:     sodium chloride (OCEAN, BABY AYR) 0.65 % nasal spray, 1 spray by Nasal route 4 times daily as needed for Congestion, Disp: , Rfl:     azelastine (ASTELIN) 0.1 % nasal spray, 1 spray by Nasal route 2 times daily, Disp: , Rfl:     diphenhydrAMINE (BENYLIN) 12.5 MG/5ML liquid, Take 25 mg by mouth 4 times daily as needed for Allergies (migrane break through), Disp: , Rfl:     Artificial Saliva (BIOTENE DRY MOUTH MOISTURIZING MT), Take 1 spray by mouth every 3 hours, Disp: , Rfl:     clotrimazole (LOTRIMIN) 1 % cream, Apply topically 2 times daily as needed Under breast and abdominal folds, Disp: , Rfl:     OIL OF OREGANO PO, Take 60 mg by mouth daily (with breakfast), Disp: , Rfl:     miconazole (MICOTIN) 2 % powder, Apply topically daily Apply to groin, under breast and skin folds. , Disp: , Rfl:     magnesium hydroxide (MILK OF MAGNESIA) 400 MG/5ML suspension, Take 30 mLs by mouth 4 times daily as needed for Constipation, Disp: , Rfl:     SENNA LEAVES PO, Take 470 mg by mouth three times daily , Disp: , Rfl:     promethazine (PHENERGAN) 25 MG tablet, Take 25 mg by mouth every 6 hours as needed for Nausea, Disp: , Rfl:     triamcinolone (KENALOG) 0.1 % cream, Apply topically daily as needed darvocet    Dilaudid [Hydromorphone Hcl]     Doxepin     Doxycycline     Duloxetine      Other reaction(s): suicidal ideation    Effexor [Venlafaxine]     Gluten Meal Other (See Comments)    Grapeseed Extract [Nutritional Supplements]      grapes    Hydrochlorothiazide     Iodine     Iron     Levaquin [Levofloxacin In D5w]     Meclizine Swelling and Other (See Comments)    Milk-Related Compounds      Dairy    Other      strawberries    Pecan Nut (Diagnostic)     Prochlorperazine Other (See Comments)    Red Dye     Reglan [Metoclopramide]     Rocephin [Ceftriaxone]     Rosemary Oil     Sulfa Antibiotics     Tetracyclines & Related     Topamax [Topiramate]     Tramadol     Triptans     [place] [Macadamia Nut Oil]     Zithromax [Azithromycin]     Adhesive Tape Rash    Betamethasone Nausea And Vomiting    Wheat Bran Dermatitis, Itching, Nausea And Vomiting and Rash        Past Medical History:   Diagnosis Date    Abnormal Pap smear of cervix     hpv age 23   [facility] Anemia     Anxiety     Asthma     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     Connective tissue disease (HCC)     Cyst of right breast     DDD (degenerative disc disease), cervical     Depression     Diabetes mellitus (HCC)     Dysphagia     Fibromyalgia     Gastroparesis     GERD (gastroesophageal reflux disease)     Headache     Hematuria     Hyperlipidemia     Hypertension     Hypothyroidism     IBS (irritable bowel syndrome)     with constipation and diarrhea    Immune deficiency disorder (HCC)     Insomnia     Kidney stones     Meningitis     x4--- twice after IVIG     PAULINE on CPAP     cpap dependence    PCOS (polycystic ovarian syndrome)     POTS (postural orthostatic tachycardia syndrome)     Pseudotumor cerebri     in 2008--- treated with LP; allergy to diamox    Raynaud's disease     Rectal bleeding     RLS (restless legs syndrome)     Scleroderma ([address]     Sjogren's disease ([address]     Thyroid nodule     TIA (transient ischemic attack)        Health Maintenance Due   Topic Date Due    Hepatitis C screen  1977    Diabetic foot exam  12/18/1987    Diabetic retinal exam  12/18/1987    HIV screen  12/18/1992    Diabetic microalbuminuria test  12/18/1995    Hepatitis B vaccine (1 of 3 - Risk 3-dose series) 12/18/1996    Cervical cancer screen  12/18/1998        Patient Active Problem List   Diagnosis    Vision loss, bilateral    Type 2 diabetes mellitus without complication, with long-term current use of insulin (HCC)    Thyroid nodule    Solitary cyst of right breast    Sjogren's syndrome with keratoconjunctivitis sicca ([facility]    Retention of urine    Restless legs    Raynaud's phenomenon without gangrene    Primary fibromyalgia syndrome    Postural orthostatic tachycardia syndrome    PAULINE on CPAP    Obesity, Class II, BMI 35-39.9    Hyperlipidemia    Other dysphagia    Lung nodule    Long term current use of insulin (HCC)    Insomnia    Intractable chronic migraine without aura and without status migrainosus    Hypothyroidism    Hypogammaglobulinemia ([facility]    History of renal calculi    Gastroparesis    Gastroesophageal reflux disease    Essential hypertension    DDD (degenerative disc disease)    CVID (common variable immunodeficiency) (HCC)    Migraine    Irritable bowel syndrome    Constipation    Diarrhea    Benign intracranial hypertension    Depression    Anxiety    Anemia    Polycystic disease, ovaries    Cervicalgia    Asthma    Connective tissue disorder (HCC)    Stroke-like symptoms    TIA (transient ischemic attack)    Weakness of extremity    Subcutaneous nodule of abdominal wall    Flu vaccine need    Lower abdominal pain    Acute upper respiratory infection    Acute urinary tract infection    Optic neuritis    Immune deficiency disorder (HCC)    Wrist sprain, right, subsequent encounter        Past Surgical History: Procedure Laterality Date    APPENDECTOMY      CARPAL TUNNEL RELEASE Bilateral     CHOLECYSTECTOMY      HYSTERECTOMY      PORT SURGERY      x3    PYLOROPLASTY          Family History   Problem Relation Age of Onset    Alzheimer's Disease Mother     Alzheimer's Disease Father     Stroke Sister     Osteoporosis Sister     Hypertension Brother     Alzheimer's Disease Paternal Aunt     Alzheimer's Disease Paternal Uncle         Social History     Tobacco Use    Smoking status: Never Smoker    Smokeless tobacco: Never Used   Substance Use Topics    Alcohol use: No     Frequency: Never    Drug use: No        Objective  Vitals:    12/28/20 1357   BP: 128/72   Site: Right Upper Arm   Position: Sitting   Cuff Size: Medium Adult   Pulse: 87   Temp: 97.5 °F (36.4 °C)   TempSrc: Temporal   Weight: 195 lb (88.5 kg)   Height: 5' 2\" (1.575 m)        Exam:  Physical Exam  Vitals signs reviewed. Constitutional:       General: She is not in acute distress. Appearance: Normal appearance. She is obese. She is not ill-appearing. HENT:      Head: Normocephalic and atraumatic. Musculoskeletal:         General: Swelling and tenderness present. Comments: 1+ edema of the dorsum of her right hand. Capillary refill normal. Ulnar and radial pulses normal. Decreased sensation to light touch of her entire hand from mid forearm down. Unable to make a fist using her right hand. Tenderness to palpation of her right hand and lower forearm   Skin:     General: Skin is warm and dry. Coloration: Skin is not jaundiced. Neurological:      Mental Status: She is alert and oriented to person, place, and time. Motor: No weakness. Psychiatric:         Mood and Affect: Mood normal.         Behavior: Behavior normal.         Thought Content: Thought content normal.         Judgment: Judgment normal.          Last labs reviewed.       ASSESSMENT & PLAN :   Problem List        Musculoskeletal and Integument    Wrist

## 2020-12-28 NOTE — ASSESSMENT & PLAN NOTE
Elevate right hand. Use sling. Ice as needed. Tylenol as needed. Ibuprofen rarely. Await recommendation from [email or web address] on January 5, 2021.

## 2020-12-30 ENCOUNTER — HOSPITAL ENCOUNTER (EMERGENCY)
Age: 45
Discharge: HOME | End: 2020-12-30
Attending: EMERGENCY MEDICINE
Payer: MEDICAID

## 2020-12-30 ENCOUNTER — APPOINTMENT (OUTPATIENT)
Dept: CT IMAGING | Age: 45
End: 2020-12-30
Payer: MEDICAID

## 2020-12-30 VITALS
TEMPERATURE: 97.1 F | WEIGHT: 194 LBS | RESPIRATION RATE: 16 BRPM | HEART RATE: 98 BPM | DIASTOLIC BLOOD PRESSURE: 86 MMHG | SYSTOLIC BLOOD PRESSURE: 132 MMHG | BODY MASS INDEX: 35.7 KG/M2 | OXYGEN SATURATION: 98 % | HEIGHT: 62 IN

## 2020-12-30 DIAGNOSIS — J40 BRONCHITIS: Primary | ICD-10-CM

## 2020-12-30 LAB
ALBUMIN SERPL-MCNC: 4.1 G/DL (ref 3.5–5.2)
ALP BLD-CCNC: 53 U/L (ref 35–104)
ALT SERPL-CCNC: 17 U/L (ref 0–32)
ANION GAP SERPL CALCULATED.3IONS-SCNC: 7 MMOL/L (ref 7–16)
AST SERPL-CCNC: 15 U/L (ref 0–31)
BASOPHILS ABSOLUTE: 0.03 E9/L (ref 0–0.2)
BASOPHILS RELATIVE PERCENT: 0.6 % (ref 0–2)
BILIRUB SERPL-MCNC: 0.4 MG/DL (ref 0–1.2)
BUN BLDV-MCNC: 14 MG/DL (ref 6–20)
CALCIUM SERPL-MCNC: 9.7 MG/DL (ref 8.6–10.2)
CHLORIDE BLD-SCNC: 100 MMOL/L (ref 98–107)
CO2: 30 MMOL/L (ref 22–29)
CREAT SERPL-MCNC: 0.6 MG/DL (ref 0.5–1)
EOSINOPHILS ABSOLUTE: 0.04 E9/L (ref 0.05–0.5)
EOSINOPHILS RELATIVE PERCENT: 0.8 % (ref 0–6)
GFR AFRICAN AMERICAN: >60
GFR NON-AFRICAN AMERICAN: >60 ML/MIN/1.73
GLUCOSE BLD-MCNC: 221 MG/DL (ref 74–99)
HCT VFR BLD CALC: 39.9 % (ref 34–48)
HEMOGLOBIN: 13 G/DL (ref 11.5–15.5)
IMMATURE GRANULOCYTES #: 0.08 E9/L
IMMATURE GRANULOCYTES %: 1.6 % (ref 0–5)
LYMPHOCYTES ABSOLUTE: 2.07 E9/L (ref 1.5–4)
LYMPHOCYTES RELATIVE PERCENT: 42.2 % (ref 20–42)
MCH RBC QN AUTO: 30.6 PG (ref 26–35)
MCHC RBC AUTO-ENTMCNC: 32.6 % (ref 32–34.5)
MCV RBC AUTO: 93.9 FL (ref 80–99.9)
MONOCYTES ABSOLUTE: 0.41 E9/L (ref 0.1–0.95)
MONOCYTES RELATIVE PERCENT: 8.4 % (ref 2–12)
NEUTROPHILS ABSOLUTE: 2.28 E9/L (ref 1.8–7.3)
NEUTROPHILS RELATIVE PERCENT: 46.4 % (ref 43–80)
PDW BLD-RTO: 13.2 FL (ref 11.5–15)
PLATELET # BLD: 217 E9/L (ref 130–450)
PMV BLD AUTO: 10 FL (ref 7–12)
POTASSIUM SERPL-SCNC: 3.9 MMOL/L (ref 3.5–5)
RBC # BLD: 4.25 E12/L (ref 3.5–5.5)
SODIUM BLD-SCNC: 137 MMOL/L (ref 132–146)
TOTAL PROTEIN: 6.6 G/DL (ref 6.4–8.3)
TROPONIN: <0.01 NG/ML (ref 0–0.03)
WBC # BLD: 4.9 E9/L (ref 4.5–11.5)

## 2020-12-30 PROCEDURE — 36415 COLL VENOUS BLD VENIPUNCTURE: CPT

## 2020-12-30 PROCEDURE — 6360000002 HC RX W HCPCS: Performed by: EMERGENCY MEDICINE

## 2020-12-30 PROCEDURE — U0003 INFECTIOUS AGENT DETECTION BY NUCLEIC ACID (DNA OR RNA); SEVERE ACUTE RESPIRATORY SYNDROME CORONAVIRUS 2 (SARS-COV-2) (CORONAVIRUS DISEASE [COVID-19]), AMPLIFIED PROBE TECHNIQUE, MAKING USE OF HIGH THROUGHPUT TECHNOLOGIES AS DESCRIBED BY CMS-2020-01-R: HCPCS

## 2020-12-30 PROCEDURE — 96374 THER/PROPH/DIAG INJ IV PUSH: CPT

## 2020-12-30 PROCEDURE — 84484 ASSAY OF TROPONIN QUANT: CPT

## 2020-12-30 PROCEDURE — 85025 COMPLETE CBC W/AUTO DIFF WBC: CPT

## 2020-12-30 PROCEDURE — 6360000004 HC RX CONTRAST MEDICATION: Performed by: RADIOLOGY

## 2020-12-30 PROCEDURE — 96375 TX/PRO/DX INJ NEW DRUG ADDON: CPT

## 2020-12-30 PROCEDURE — 99285 EMERGENCY DEPT VISIT HI MDM: CPT

## 2020-12-30 PROCEDURE — 70450 CT HEAD/BRAIN W/O DYE: CPT

## 2020-12-30 PROCEDURE — 93005 ELECTROCARDIOGRAM TRACING: CPT | Performed by: EMERGENCY MEDICINE

## 2020-12-30 PROCEDURE — 71260 CT THORAX DX C+: CPT

## 2020-12-30 PROCEDURE — 6370000000 HC RX 637 (ALT 250 FOR IP): Performed by: EMERGENCY MEDICINE

## 2020-12-30 PROCEDURE — 80053 COMPREHEN METABOLIC PANEL: CPT

## 2020-12-30 RX ORDER — ONDANSETRON 4 MG/1
4 TABLET, ORALLY DISINTEGRATING ORAL EVERY 8 HOURS PRN
Qty: 10 TABLET | Refills: 0 | Status: SHIPPED | OUTPATIENT
Start: 2020-12-30 | End: 2021-01-26 | Stop reason: ALTCHOICE

## 2020-12-30 RX ORDER — ONDANSETRON 4 MG/1
4 TABLET, ORALLY DISINTEGRATING ORAL ONCE
Status: COMPLETED | OUTPATIENT
Start: 2020-12-30 | End: 2020-12-30

## 2020-12-30 RX ORDER — KETOROLAC TROMETHAMINE 30 MG/ML
15 INJECTION, SOLUTION INTRAMUSCULAR; INTRAVENOUS ONCE
Status: COMPLETED | OUTPATIENT
Start: 2020-12-30 | End: 2020-12-30

## 2020-12-30 RX ORDER — OXYCODONE HYDROCHLORIDE AND ACETAMINOPHEN 5; 325 MG/1; MG/1
1 TABLET ORAL ONCE
Status: COMPLETED | OUTPATIENT
Start: 2020-12-30 | End: 2020-12-30

## 2020-12-30 RX ORDER — ALBUTEROL SULFATE 90 UG/1
2 AEROSOL, METERED RESPIRATORY (INHALATION) EVERY 4 HOURS PRN
Qty: 1 INHALER | Status: SHIPPED | OUTPATIENT
Start: 2020-12-30 | End: 2021-01-26 | Stop reason: ALTCHOICE

## 2020-12-30 RX ORDER — LORATADINE 10 MG/1
10 TABLET ORAL DAILY
Qty: 7 TABLET | Refills: 0 | Status: SHIPPED | OUTPATIENT
Start: 2020-12-30 | End: 2021-01-06

## 2020-12-30 RX ORDER — BENZONATATE 100 MG/1
100 CAPSULE ORAL 3 TIMES DAILY PRN
Qty: 21 CAPSULE | Refills: 0 | Status: SHIPPED | OUTPATIENT
Start: 2020-12-30 | End: 2021-01-06

## 2020-12-30 RX ORDER — ONDANSETRON 2 MG/ML
4 INJECTION INTRAMUSCULAR; INTRAVENOUS ONCE
Status: COMPLETED | OUTPATIENT
Start: 2020-12-30 | End: 2020-12-30

## 2020-12-30 RX ORDER — HEPARIN SODIUM (PORCINE) LOCK FLUSH IV SOLN 100 UNIT/ML 100 UNIT/ML
SOLUTION INTRAVENOUS
Status: DISCONTINUED
Start: 2020-12-30 | End: 2020-12-30 | Stop reason: HOSPADM

## 2020-12-30 RX ADMIN — OXYCODONE AND ACETAMINOPHEN 1 TABLET: 5; 325 TABLET ORAL at 19:04

## 2020-12-30 RX ADMIN — KETOROLAC TROMETHAMINE 15 MG: 30 INJECTION, SOLUTION INTRAMUSCULAR at 17:00

## 2020-12-30 RX ADMIN — ONDANSETRON 4 MG: 4 TABLET, ORALLY DISINTEGRATING ORAL at 19:04

## 2020-12-30 RX ADMIN — IOPAMIDOL 75 ML: 755 INJECTION, SOLUTION INTRAVENOUS at 17:28

## 2020-12-30 RX ADMIN — ONDANSETRON 4 MG: 2 INJECTION INTRAMUSCULAR; INTRAVENOUS at 16:59

## 2020-12-30 ASSESSMENT — PAIN DESCRIPTION - LOCATION: LOCATION: GENERALIZED

## 2020-12-30 ASSESSMENT — PAIN DESCRIPTION - DESCRIPTORS
DESCRIPTORS: ACHING
DESCRIPTORS: ACHING;DISCOMFORT;TENDER

## 2020-12-30 ASSESSMENT — PAIN DESCRIPTION - ORIENTATION: ORIENTATION: ANTERIOR

## 2020-12-30 ASSESSMENT — PAIN SCALES - GENERAL
PAINLEVEL_OUTOF10: 8
PAINLEVEL_OUTOF10: 8

## 2020-12-30 ASSESSMENT — PAIN DESCRIPTION - PROGRESSION: CLINICAL_PROGRESSION: NOT CHANGED

## 2020-12-30 ASSESSMENT — PAIN DESCRIPTION - FREQUENCY: FREQUENCY: CONTINUOUS

## 2020-12-30 ASSESSMENT — PAIN DESCRIPTION - PAIN TYPE: TYPE: ACUTE PAIN

## 2020-12-30 NOTE — ED NOTES
Swab obtained from right nares  Pt states headache and nausea is increasing     Dar Melo RN  12/30/20 7513

## 2020-12-30 NOTE — ED NOTES
Accessed power port (Pt has card) right chest wall-  Red tube discard.   Inserted without any problems     Eileen Cameron, RN  12/30/20 8075

## 2020-12-31 ENCOUNTER — CARE COORDINATION (OUTPATIENT)
Dept: CARE COORDINATION | Age: 45
End: 2020-12-31

## 2020-12-31 LAB
EKG ATRIAL RATE: 104 BPM
EKG P AXIS: 32 DEGREES
EKG P-R INTERVAL: 148 MS
EKG Q-T INTERVAL: 338 MS
EKG QRS DURATION: 68 MS
EKG QTC CALCULATION (BAZETT): 444 MS
EKG R AXIS: 6 DEGREES
EKG T AXIS: 0 DEGREES
EKG VENTRICULAR RATE: 104 BPM

## 2020-12-31 PROCEDURE — 93010 ELECTROCARDIOGRAM REPORT: CPT | Performed by: INTERNAL MEDICINE

## 2020-12-31 NOTE — CARE COORDINATION
Ambulatory Care Coordination Note  12/31/2020  CM Risk Score: 2  Charlson 10 Year Mortality Risk Score: 47%     ACC: Wright Favre, RN    Summary Note:   ACM contacted patient to follow up on her status and to discuss ED visit (documented on a separate note). Patient has received a voice message from [address] [place] [id number] and she plans on returning this call. Patient has had 2 recent ED visits. She states she was told to go to the ED by her pulmonologist and her PCP related to her symptoms of productive cough and shortness of breath. DM  -See assessment  -Patient states her blood sugars readings are fluctuating.  -Patient and ACM feel patient is in the yellow zone on the DM zone tool d/t elevated blood sugars. PLAN  -Continue to monitor  -Place referral to RD.   -F/U to determine when patient will begin receiving medications in pre-fill packages through the mail.   -Continue to monitor patient's blood sugar readings and s/s (if any) of hyperglycemia. Goals Addressed                 This Visit's Progress       Patient Stated     Patient Stated (pt-stated)   Worsening     I will attempt to decrease my ED visits    Barriers: overwhelmed by complexity of regimen and lack of education  Plan for overcoming my barriers: Care Coordination, educate patient on when to utilize the ED and when to contact PCP and/or ACM  Confidence: 6/10  Anticipated Goal Completion Date: 03/23/2021         Other     Conditions and Symptoms   On track     I will schedule office visits, as directed by my provider. I will keep my appointment or reschedule if I have to cancel. I will follow my Zone Management tool to seek urgent or emergent care. I will notify my provider of any symptoms that indicate a worsening of my condition.   R/T DM    Barriers: impairment:  visual and lack of education  Plan for overcoming my barriers: Care Coordination, ACM to send patient DM Zone tool  Confidence: 7/10  Anticipated Goal Completion Date: 03/23/2021         Nutrition Plan   No change     I will allow ACM to make a referral to RD to discuss dietary management of DM    Barriers: lack of education  Plan for overcoming my barriers: Care Coordination, ACM to refer to RD  Confidence: 8/10  Anticipated Goal Completion Date: 03/23/2021       Self Monitoring   On track     Self-Monitored Blood Glucose - I will check my blood sugar blood sugars ac & HS  I will notify my provider of any trends of increasing or decreasing blood sugars over a 1 month period. I will notify my provider if I have any blood sugar readings less than 70 more than 2 times a month. Patient Reported Blood Glucose: 195    Barriers: impairment:  visual and overwhelmed by complexity of regimen  Plan for overcoming my barriers: Care Coordination, Review Blood sugar results, Educate on when to contact provider  Confidence: 7/10  Anticipated Goal Completion Date: 03/23/2021              Prior to Admission medications    Medication Sig Start Date End Date Taking?  Authorizing Provider   loratadine (CLARITIN) 10 MG tablet Take 1 tablet by mouth daily for 7 days 12/30/20 1/6/21  Obdulia Lawrence MD   albuterol sulfate HFA (PROVENTIL HFA) 108 (90 Base) MCG/ACT inhaler Inhale 2 puffs into the lungs every 4 hours as needed for Wheezing 12/30/20 12/30/21  Obdulia Lawrence MD   benzonatate (TESSALON PERLES) 100 MG capsule Take 1 capsule by mouth 3 times daily as needed for Cough 12/30/20 1/6/21  Obdulia Lawrence MD   ondansetron (ZOFRAN ODT) 4 MG disintegrating tablet Take 1 tablet by mouth every 8 hours as needed for Nausea or Vomiting 12/30/20   Obdulia Lawrence MD   polyethylene glycol (GLYCOLAX) 17 g packet Take 17 g by mouth 2 times daily as needed for Constipation 12/28/20   Vicenta Wallace DO   ibuprofen (ADVIL;MOTRIN) 800 MG tablet Take 1 tablet by mouth 2 times daily as needed for Pain 12/22/20 1/1/21  Destiny Rosa PA-C   glucose monitoring kit (FREESTYLE) monitoring kit 1 kit by Does not apply route daily 12/17/20   Vicenta Wallace, DO   blood glucose monitor strips Test 4 times a day & as needed for symptoms of irregular blood glucose. Dispense sufficient amount for indicated testing frequency plus additional to accommodate PRN testing needs. 12/17/20   Vicenta Wallace, DO   Lancets MISC 1 each by Does not apply route 4 times daily 12/17/20   Vicenta Wallace, DO   tiZANidine (ZANAFLEX) 2 MG tablet Take 1 tablet by mouth 2 times daily as needed (muscle) 12/7/20 3/7/21  Vicenta Wallace, DO   benzonatate (TESSALON) 100 MG capsule Take 1 capsule by mouth 3 times daily as needed for Cough 12/1/20 12/31/20  REYNOLD Yousif - ASPEN   methylPREDNISolone (MEDROL) 4 MG tablet daily Titration from pulmonologist.    Historical Provider, MD   Milk Thistle 150 MG CAPS Take by mouth 1 daily    Historical Provider, MD   Polyvinyl Alcohol-Povidone (REFRESH OP) Apply to eye Artificial tears    Historical Provider, MD   insulin glargine (LANTUS SOLOSTAR) 100 UNIT/ML injection pen Injects 4 units in the morning and 14 units at night 11/10/20   Historical Provider, MD   BENZONATATE PO 1 tid prn 11/4/20   Historical Provider, MD   ketorolac (TORADOL) 10 MG tablet Take 1 tablet by mouth every 6 hours as needed for Pain 10/19/20   Joselyn Nolasco,    Magnesium 500 MG TABS Take 1 tablet by mouth 2 times daily    Historical Provider, MD   insulin lispro, 1 Unit Dial, (HUMALOG KWIKPEN) 100 UNIT/ML SOPN Inject into the skin daily Sliding scale: 150-200- 4 units, then add 2 units for every 50 up to a max of 12 units.     Historical Provider, MD   Probiotic Product (PROBIOTIC DAILY PO)     Historical Provider, MD   propranolol (INDERAL) 10 MG tablet Take 1 tablet by mouth 3 times daily 10/5/20   Vicenta Wallace, DO   furosemide (LASIX) 20 MG tablet Take 1 tablet by mouth daily 10/5/20   Vicenta Wallace, DO   aspirin 81 MG EC tablet Take 81 mg by mouth daily    Historical Provider, MD   atorvastatin (LIPITOR) 10 MG tablet Take 1 tablet by mouth daily 9/4/20   REYNOLD Barth NP   rOPINIRole (REQUIP) 0.5 MG tablet Take 1 tablet by mouth See Admin Instructions 1 tablet - breakfast  2 tablets - supper  Patient taking differently: Take 0.5 mg by mouth See Admin Instructions 1 tablet qAM and 2 tablets qPM 9/1/20 11/30/20  Vicenta Wallace,    levothyroxine (SYNTHROID) 50 MCG tablet Take 2.5 tablets by mouth Five times weekly Given Monday,Tuesday,Wednesday,Thursday,Friday 9/1/20   Vicenta Wallace,    levothyroxine (SYNTHROID) 50 MCG tablet Take 2 tablets by mouth Twice a Week Given Saturday,[place] 9/3/20   Vicenta Wallace DO   pantoprazole (PROTONIX) 40 MG tablet Take 1 tablet by mouth every morning (before breakfast) 9/1/20   Vicenta Wallace,    folic acid (FOLVITE) 1 MG tablet Take 1 tablet by mouth Daily with lunch 9/1/20   Vicenta Wallace,    acetaminophen (TYLENOL) 325 MG tablet Take 650 mg by mouth every 6 hours as needed for Pain    Historical Provider, MD   sodium chloride (OCEAN, BABY AYR) 0.65 % nasal spray 1 spray by Nasal route 4 times daily as needed for Congestion    Historical Provider, MD   azelastine (ASTELIN) 0.1 % nasal spray 1 spray by Nasal route 2 times daily    Historical Provider, MD   diphenhydrAMINE (BENYLIN) 12.5 MG/5ML liquid Take 25 mg by mouth 4 times daily as needed for Allergies (migrane break through)    Historical Provider, MD   Artificial Saliva (BIOTENE DRY MOUTH MOISTURIZING MT) Take 1 spray by mouth every 3 hours    Historical Provider, MD   clotrimazole (LOTRIMIN) 1 % cream Apply topically 2 times daily as needed Under breast and abdominal folds    Historical Provider, MD   OIL OF OREGANO PO Take 60 mg by mouth daily (with breakfast)    Historical Provider, MD   miconazole (MICOTIN) 2 % powder Apply topically daily Apply to groin, under breast and skin folds.     Historical Provider, MD   magnesium hydroxide (MILK OF MAGNESIA) 400 MG/5ML suspension Take 30 mLs by mouth 4 times daily as needed for Constipation    Historical Provider, MD   SENNA LEAVES PO Take 470 mg by mouth three times daily     Historical Provider, MD   promethazine (PHENERGAN) 25 MG tablet Take 25 mg by mouth every 6 hours as needed for Nausea    Historical Provider, MD   triamcinolone (KENALOG) 0.1 % cream Apply topically daily as needed (rash)    Historical Provider, MD   NONFORMULARY Take 2 capsules by mouth 2 times daily (with meals) Turmeric Ginger    Historical Provider, MD   potassium chloride (KLOR-CON) 20 MEQ packet Take 20 mEq by mouth daily 8/19/20 11/17/20  Vicenta Wallace DO   levomilnacipran (FETZIMA) 40 MG CP24 extended release capsule Take 40 mg by mouth daily    Historical Provider, MD   Galcanezumab-gnlm (EMGALITY) 120 MG/ML SOAJ Inject 120 mg into the skin every 30 days  11/18/19   Historical Provider, MD   clonazePAM (KLONOPIN) 0.5 MG tablet Take 0.5 mg by mouth 2 times daily. Ana Maria Hernandez     Historical Provider, MD   divalproex (DEPAKOTE) 250 MG DR tablet Take 500 mg by mouth See Admin Instructions 250 mg -  mg - Afternoon 500 mg - PM 4/16/17   Historical Provider, MD   econazole nitrate 1 % cream Apply topically daily as needed (Apply under breasts and folds)     Historical Provider, MD   fluticasone (FLONASE) 50 MCG/ACT nasal spray 1 spray by Nasal route 2 times daily     Historical Provider, MD   fluticasone-salmeterol (ADVAIR HFA) 230-21 MCG/ACT inhaler Inhale 2 puffs into the lungs 2 times daily    Historical Provider, MD   ipratropium-albuterol (DUONEB) 0.5-2.5 (3) MG/3ML SOLN nebulizer solution Inhale 1 vial into the lungs every 4 hours as needed for Shortness of Breath     Historical Provider, MD   montelukast (SINGULAIR) 10 MG tablet Take 10 mg by mouth nightly    Historical Provider, MD   ondansetron (ZOFRAN-ODT) 8 MG TBDP disintegrating tablet Take 8 mg by mouth every 8 hours as needed for Nausea or Vomiting     Historical Provider, MD       Future Appointments   Date Time Provider Department Center   2/23/2021  3:20 PM [address], APRN - NP [facility]   3/11/2021  9:30 AM DO [facility]      and   Diabetes Assessment    Medic Alert ID: No  Meal Planning: None   How often do you test your blood sugar?: Daily, Meals, Bedtime   Do you have barriers with adherence to non-pharmacologic self-management interventions?  (Nutrition/Exercise/Self-Monitoring): No   Have you ever had to go to the ED for symptoms of low blood sugar?: No       No patient-reported symptoms   Do you have hyperglycemia symptoms?: No   Do you have hypoglycemia symptoms?: No   Last Blood Sugar Value: 229   Blood Sugar Monitoring Regimen: Morning Fasting, At Bedtime, Before Meals   Blood Sugar Trends: Fluctuating

## 2020-12-31 NOTE — ED NOTES
Patient discharge instructions highlighted,  educated on discharge instructions, verbalized understanding. Verified birth date on prescriptions. Proper medication administration, including  doses, timing and side effects and when to contact family physician. Patient was discharged home.      Amparo Calles RN  12/30/20 7152

## 2020-12-31 NOTE — CARE COORDINATION
Patient has following risk factors of: immunocompromised and diabetes. CTN/ACM reviewed discharge instructions, medical action plan and red flags such as increased shortness of breath, increasing fever and signs of decompensation with patient who verbalized understanding. Discussed exposure protocols and quarantine with CDC Guidelines What to do if you are sick with coronavirus disease 2019.  Patient was given an opportunity for questions and concerns. The patient agrees to contact the [facility] exposure line [phone], local [facility] department [place] Department of Health: ([phone]) and PCP office for questions related to their healthcare. CTN/ACM provided contact information for future needs. Reviewed and educated patient on any new and changed medications related to discharge diagnosis     Patient states she is taking the medications ordered by the ED provider    Patient is active on My Chart and is aware how to utilize this tool. Patient/family/caregiver given information for [employer] and agrees to enroll yes  Patient's preferred e-mail: [email or web address]   Patient's preferred phone number: [phone]  Based on Loop alert triggers, patient will be contacted by nurse care manager for worsening symptoms. Pt will be further monitored by COVID Loop Team based on severity of symptoms and risk factors. ACM reviewed self quarantine recommendations d/t COVID test results are pending. Steps to help prevent the spread of COVID-19 if you are sick  SOURCE - https://obi-montes.info/. html-     Stay home except to get medical care   ; Stay home: People who are mildly ill with COVID-19 are able to isolate at home during their illness.  You should restrict activities outside your home, except for getting medical care.   ; Avoid public areas: Do not go to work, school, or public areas.   ; Avoid public transportation: Avoid using public transportation, ride-sharing, or taxis.  ; Separate yourself from other people and animals in your home   ; Stay away from others: As much as possible, you should stay in a specific room and away from other people in your home. Also, you should use a separate bathroom, if available.   ; Limit contact with pets & animals: You should restrict contact with pets and other animals while y-ou are sick with COVID-19, just like you would around other people. Although there have not been reports of pets or other animals becoming sick with COVID-19, it is still recommended that people sick with COVID-19 limit contact with animals until more information is known about the virus. ; When possible, have another member of your household care for your animals while you are sick. If you are sick with COVID-19, avoid contact with your pet, including petting, snuggling, being kissed or licked, and sharing food. If you must care for your pet or be around animals while you are sick, wash your hands before and after you interact with pets and wear a facemask. See COVID-19 and Animals for more information. Other considerations   The ill person should eat/be fed in their room if possible. Non-disposable  items used should be handled with gloves and washed with hot water or in a . Clean hands after handling used  items.  If possible, dedicate a lined trash can for the ill person. Use gloves when removing garbage bags, handling, and disposing of trash. Wash hands after handling or disposing of trash.  Consider consulting with your local health department about trash disposal guidance if available. Information for Household Members and Caregivers of Someone who is Sick   Call ahead before visiting your doctor   Call ahead: If you have a medical appointment, call the healthcare provider and tell them that you have or may have COVID-19.  This will help the healthcare provider's office take steps to keep other people from getting infected or exposed. Wear a facemask if you are sick   ; If you are sick: You should wear a facemask when you are around other people (e.g., sharing a room or vehicle) or pets and before you enter a healthcare provider's office. ; If you are caring for others: If the person who is sick is not able to wear a facemask (for example, because it causes trouble breathing), then people who live with the person who is sick should not stay in the same room with them, or they should wear a facemask if they enter a room with the person who is sick. Cover your coughs and sneezes   ; Cover: Cover your mouth and nose with a tissue when you cough or sneeze.   ; Dispose: Throw used tissues in a lined trash can.   ; Wash hands: Immediately wash your hands with soap and water for at least 20 seconds or, if soap and water are not available, clean your hands with an alcohol-based hand  that contains at least 60% alcohol. Clean your hands often   ; Wash hands: Wash your hands often with soap and water for at least 20 seconds, especially after blowing your nose, coughing, or sneezing; going to the bathroom; and before eating or preparing food.   ; Hand : If soap and water are not readily available, use an alcohol-based hand  with at least 60% alcohol, covering all surfaces of your hands and rubbing them together until they feel dry.   ; Soap and water: Soap and water are the best option if hands are visibly dirty.   ; Avoid touching: Avoid touching your eyes, nose, and mouth with unwashed hands. Handwashing Tips   ; Wet your hands with clean, running water (warm or cold), turn off the tap, and apply soap.  ; Lather your hands by rubbing them together with the soap. Lather the backs of your hands, between your fingers, and under your nails. ; Scrub your hands for at least 20 seconds. Need a timer?  [place] from beginning to end twice.  ; Rinse your hands well under clean, running water.  ; Dry your hands using a clean towel or air dry them. Avoid sharing personal household items   ; Do not share: You should not share dishes, drinking glasses, cups, eating utensils, towels, or bedding with other people or pets in your home.   ; Wash thoroughly after use: After using these items, they should be washed thoroughly with soap and water. Clean all high-touch surfaces everyday   ; Clean and disinfect: Practice routine cleaning of high touch surfaces.  ; High touch surfaces include counters, tabletops, doorknobs, bathroom fixtures, toilets, phones, keyboards, tablets, and bedside tables.  ; Disinfect areas with bodily fluids: Also, clean any surfaces that may have blood, stool, or body fluids on them.   ; Household : Use a household cleaning spray or wipe, according to the label instructions. Labels contain instructions for safe and effective use of the cleaning product including precautions you should take when applying the product, such as wearing gloves and making sure you have good ventilation during use of the product. Monitor your symptoms   Seek medical attention: Seek prompt medical attention if your illness is worsening     (e.g., difficulty breathing).   ; Call your doctor: Before seeking care, call your healthcare provider and tell them that you have, or are being evaluated for, COVID-19.   ; Wear a facemask when sick: Put on a facemask before you enter the facility. These steps will help the healthcare provider's office to keep other people in the office or waiting room from getting infected or exposed. ; Alert health department: Ask your healthcare provider to call the local or state health department.  Persons who are placed under active monitoring or facilitated self-monitoring should follow instructions provided by their local health department or occupational health professionals, as appropriate.  ; Call 911 if you have a medical emergency: If you have a medical emergency and need to call 911, notify the dispatch personnel that you have, or are being evaluated for COVID-19. If possible, put on a facemask before emergency medical services arrive.

## 2021-01-01 ENCOUNTER — CARE COORDINATION (OUTPATIENT)
Dept: CARE COORDINATION | Age: 46
End: 2021-01-01

## 2021-01-01 LAB
SARS-COV-2: NOT DETECTED
SOURCE: NORMAL

## 2021-01-01 NOTE — CARE COORDINATION
Comment alert noted in Loop remote symptom monitoring program.     Beth Estrada, 11:48 AM  My PCP is not in office on fridays but today is when muscle aches began have no flavor to food or drinks. Yesterday no appetite. Kareen Vazquez LPN, 86:78 AM  [facility], most symptoms of Covid can be treated with over the counter medications. Try Tylenol or Aleve for your muscle aches if not contraindicated. Loss of Taste and Smell is a common symptom of Covid. It usually returns within a couple of weeks of other symptoms subsiding.

## 2021-01-02 ENCOUNTER — CARE COORDINATION (OUTPATIENT)
Dept: CARE COORDINATION | Age: 46
End: 2021-01-02

## 2021-01-02 ENCOUNTER — APPOINTMENT (OUTPATIENT)
Dept: GENERAL RADIOLOGY | Age: 46
End: 2021-01-02
Payer: MEDICAID

## 2021-01-02 ENCOUNTER — HOSPITAL ENCOUNTER (EMERGENCY)
Age: 46
Discharge: HOME | End: 2021-01-02
Attending: FAMILY MEDICINE
Payer: MEDICAID

## 2021-01-02 VITALS
HEIGHT: 62 IN | HEART RATE: 98 BPM | RESPIRATION RATE: 16 BRPM | TEMPERATURE: 97.3 F | DIASTOLIC BLOOD PRESSURE: 88 MMHG | WEIGHT: 198 LBS | BODY MASS INDEX: 36.44 KG/M2 | OXYGEN SATURATION: 97 % | SYSTOLIC BLOOD PRESSURE: 140 MMHG

## 2021-01-02 DIAGNOSIS — J18.9 PNEUMONIA DUE TO ORGANISM: ICD-10-CM

## 2021-01-02 DIAGNOSIS — R07.9 CHEST PAIN, UNSPECIFIED TYPE: ICD-10-CM

## 2021-01-02 DIAGNOSIS — R05.9 COUGH: Primary | ICD-10-CM

## 2021-01-02 LAB
ALBUMIN SERPL-MCNC: 4.1 G/DL (ref 3.5–5.2)
ALP BLD-CCNC: 53 U/L (ref 35–104)
ALT SERPL-CCNC: 15 U/L (ref 0–32)
ANION GAP SERPL CALCULATED.3IONS-SCNC: 9 MMOL/L (ref 7–16)
AST SERPL-CCNC: 15 U/L (ref 0–31)
BASOPHILS ABSOLUTE: 0.04 E9/L (ref 0–0.2)
BASOPHILS RELATIVE PERCENT: 0.8 % (ref 0–2)
BILIRUB SERPL-MCNC: 0.3 MG/DL (ref 0–1.2)
BUN BLDV-MCNC: 16 MG/DL (ref 6–20)
CALCIUM SERPL-MCNC: 9.6 MG/DL (ref 8.6–10.2)
CHLORIDE BLD-SCNC: 102 MMOL/L (ref 98–107)
CO2: 28 MMOL/L (ref 22–29)
CREAT SERPL-MCNC: 0.8 MG/DL (ref 0.5–1)
EOSINOPHILS ABSOLUTE: 0.05 E9/L (ref 0.05–0.5)
EOSINOPHILS RELATIVE PERCENT: 1 % (ref 0–6)
GFR AFRICAN AMERICAN: >60
GFR NON-AFRICAN AMERICAN: >60 ML/MIN/1.73
GLUCOSE BLD-MCNC: 167 MG/DL (ref 74–99)
HCT VFR BLD CALC: 40.3 % (ref 34–48)
HEMOGLOBIN: 13 G/DL (ref 11.5–15.5)
IMMATURE GRANULOCYTES #: 0.09 E9/L
IMMATURE GRANULOCYTES %: 1.8 % (ref 0–5)
LACTIC ACID: 0.9 MMOL/L (ref 0.5–2.2)
LYMPHOCYTES ABSOLUTE: 2.38 E9/L (ref 1.5–4)
LYMPHOCYTES RELATIVE PERCENT: 46.9 % (ref 20–42)
MCH RBC QN AUTO: 30.7 PG (ref 26–35)
MCHC RBC AUTO-ENTMCNC: 32.3 % (ref 32–34.5)
MCV RBC AUTO: 95.3 FL (ref 80–99.9)
MONOCYTES ABSOLUTE: 0.36 E9/L (ref 0.1–0.95)
MONOCYTES RELATIVE PERCENT: 7.1 % (ref 2–12)
NEUTROPHILS ABSOLUTE: 2.15 E9/L (ref 1.8–7.3)
NEUTROPHILS RELATIVE PERCENT: 42.4 % (ref 43–80)
PDW BLD-RTO: 14 FL (ref 11.5–15)
PLATELET # BLD: 213 E9/L (ref 130–450)
PMV BLD AUTO: 10.2 FL (ref 7–12)
POTASSIUM REFLEX MAGNESIUM: 4.2 MMOL/L (ref 3.5–5)
RBC # BLD: 4.23 E12/L (ref 3.5–5.5)
SODIUM BLD-SCNC: 139 MMOL/L (ref 132–146)
TOTAL PROTEIN: 6.6 G/DL (ref 6.4–8.3)
WBC # BLD: 5.1 E9/L (ref 4.5–11.5)

## 2021-01-02 PROCEDURE — 83605 ASSAY OF LACTIC ACID: CPT

## 2021-01-02 PROCEDURE — 85025 COMPLETE CBC W/AUTO DIFF WBC: CPT

## 2021-01-02 PROCEDURE — U0003 INFECTIOUS AGENT DETECTION BY NUCLEIC ACID (DNA OR RNA); SEVERE ACUTE RESPIRATORY SYNDROME CORONAVIRUS 2 (SARS-COV-2) (CORONAVIRUS DISEASE [COVID-19]), AMPLIFIED PROBE TECHNIQUE, MAKING USE OF HIGH THROUGHPUT TECHNOLOGIES AS DESCRIBED BY CMS-2020-01-R: HCPCS

## 2021-01-02 PROCEDURE — 80053 COMPREHEN METABOLIC PANEL: CPT

## 2021-01-02 PROCEDURE — 71045 X-RAY EXAM CHEST 1 VIEW: CPT

## 2021-01-02 PROCEDURE — 36415 COLL VENOUS BLD VENIPUNCTURE: CPT

## 2021-01-02 PROCEDURE — 99283 EMERGENCY DEPT VISIT LOW MDM: CPT

## 2021-01-02 RX ORDER — CEFDINIR 300 MG/1
300 CAPSULE ORAL 2 TIMES DAILY
Qty: 20 CAPSULE | Refills: 0 | Status: SHIPPED | OUTPATIENT
Start: 2021-01-02 | End: 2021-01-06 | Stop reason: SINTOL

## 2021-01-02 RX ORDER — MULTIVIT-MIN/IRON/FOLIC ACID/K 18-600-40
1 CAPSULE ORAL DAILY
Qty: 30 CAPSULE | Refills: 0 | Status: SHIPPED | OUTPATIENT
Start: 2021-01-02 | End: 2021-01-26 | Stop reason: ALTCHOICE

## 2021-01-02 RX ORDER — 0.9 % SODIUM CHLORIDE 0.9 %
1000 INTRAVENOUS SOLUTION INTRAVENOUS ONCE
Status: DISCONTINUED | OUTPATIENT
Start: 2021-01-02 | End: 2021-01-02 | Stop reason: HOSPADM

## 2021-01-02 ASSESSMENT — PAIN SCALES - GENERAL: PAINLEVEL_OUTOF10: 8

## 2021-01-02 ASSESSMENT — PAIN DESCRIPTION - DESCRIPTORS: DESCRIPTORS: ACHING

## 2021-01-02 ASSESSMENT — PAIN DESCRIPTION - PROGRESSION: CLINICAL_PROGRESSION: GRADUALLY WORSENING

## 2021-01-02 ASSESSMENT — PAIN DESCRIPTION - FREQUENCY: FREQUENCY: CONTINUOUS

## 2021-01-02 NOTE — CARE COORDINATION
Comment alert noted in Loop remote symptom monitoring program. Messaged patient to notify Diana Mcfarland if symptoms have worsened since yesterday. Jennifer Doty, 12:37 AM  5pm temps 102.4 tylenol, ice to neck, popsicles, cool shower hasnt brought it down. Last temp 102.6 at 10p.  said its hard to awake during day, woke every 30-60 minutes to push fluids. Said when up Id fall asleep sitting up. Lightheadedness and pain across my back hurts worse when breathe in. Pulse ox 88-92%. I dont know what Im supposed to do? Kareen Vazquez LPN, 9:85 AM  Hi, I'm Kareen Vazquez LPN from the [place] [address] Team. I tried to call you and left you a voicemail. Please let us know if your symptoms are worse than yesterday or if you wish to speak to a nurse. You can also E-mail your message to us. We are available between 8am to 4 pm Mon-Fri. and 9am to 1pm weekends. If your symptoms are severe - Please call your doctor, call 911 and/or go to the nearest Emergency Room. Pt returned call:     Pt states that her Pulse Ox readings are between 84-90% on RA, Pt stated that her readings do not improve with nebulize, inhaler, or CPAP. Pt reports Temp 101.2 today, nausea, vomiting x 1,  harsh/deep cough with thick phlegm that is hard to bring up, Burning sensation when coughing, back pain, fatigue, and body aches. Advised patient given her symptoms to return to the ER for evaluation. Pt verbalizes understanding.

## 2021-01-02 NOTE — ED NOTES
Pt. Ambulated, starting spo2 was 100%, spo2 dropped to 95% had heart rate was 115 by end of ambulation pt. Was very short of breath.  Flory Reno RN notified     Elan Okeefe  01/02/21 [id number]

## 2021-01-02 NOTE — ED PROVIDER NOTES
ED Attending  CC: No       [address] [facility]  Department of Emergency Medicine   ED  Encounter Note  Admit Date/RoomTime: 2021 11:49 AM  ED Room:     NAME: Meño Fitzgerald  : 1975  MRN: [id number]     Chief Complaint:  Chest Pain (with shortness of breath. Seen here for this on Wednesday. +for cough. Tested for covid and it was negative. )    History of Present Illness          Meño Fitzgerald is a 39 y.o. old female who presents to the emergency department by private vehicle, with waxing and waning right and left chest discomfort described as aching beginning over a week  prior to arrival.  The pain does not  radiate to neck, back or abdomen. Duration of symptoms: to the present time   Symptom(s) at onset was mild. Her symptoms are associated with headache, fever and cough. The symptoms are worsened by nothing and relieved by nothing. There has been No orthopnea, No paroxysmal nocturnal dyspnea, No edema, No palpitations and No syncope associated with complaint. She takes no blood thinning agents. Her cardiac risk factors are none. Care prior to arrival consisted of nothing tried , with minimal relief. She states she was seen several days ago in the emergency department for her chest pain patient had an extensive work-up including a CTA of the chest and lab work. Patient states that she is not getting any better and call her immunologist and was told to come back in for reevaluation. States that nothing makes her symptoms worse and nothing makes her symptoms better     . ROS   Pertinent positives and negatives are stated within HPI, all other systems reviewed and are negative.     Past Medical History:  has a past medical history of Abnormal Pap smear of cervix, Anemia, Anxiety, Asthma, Blind, Connective tissue disease ([address] Cyst of right breast, DDD (degenerative disc disease), cervical, Depression, Diabetes mellitus ([address] Dysphagia, Fibromyalgia, Gastroparesis, GERD (gastroesophageal reflux disease), Headache, Hematuria, Hyperlipidemia, Hypertension, Hypothyroidism, IBS (irritable bowel syndrome), Immune deficiency disorder ([facility] [address] Insomnia, Kidney stones, Meningitis, PAULINE on CPAP, PCOS (polycystic ovarian syndrome), POTS (postural orthostatic tachycardia syndrome), Pseudotumor cerebri, Raynaud's disease, Rectal bleeding, RLS (restless legs syndrome), Scleroderma ([facility] [address] Sjogren's disease ([facility] [address] Thyroid nodule, and TIA (transient ischemic attack). Surgical History:  has a past surgical history that includes Hysterectomy; Cholecystectomy; Appendectomy; Carpal tunnel release (Bilateral); Port Surgery; and Pyloroplasty. Social History:  reports that she has never smoked. She has never used smokeless tobacco. She reports that she does not drink alcohol or use drugs. Family History: family history includes Alzheimer's Disease in her father, mother, paternal aunt, and paternal uncle; Hypertension in her brother; Osteoporosis in her sister; Stroke in her sister.      Allergies: Diamox [acetazolamide], Abilify [aripiprazole], Aspartame and phenylalanine, Cymbalta [duloxetine hcl], Darvon [propoxyphene], Dilaudid [hydromorphone hcl], Doxepin, Doxycycline, Duloxetine, Effexor [venlafaxine], Gluten meal, Grapeseed extract [nutritional supplements], Hydrochlorothiazide, Iron, Levaquin [levofloxacin in d5w], Meclizine, Milk-related compounds, Other, Pecan nut (diagnostic), Prochlorperazine, Red dye, Reglan [metoclopramide], Rocephin [ceftriaxone], Rosemary oil, Sulfa antibiotics, Tetracyclines & related, Topamax [topiramate], Tramadol, Triptans, [place] [macadamia nut oil], Zithromax [azithromycin], Adhesive tape, Betamethasone, and Wheat bran    Physical Exam   Oxygen Saturation Interpretation: Normal.        ED Triage Vitals   BP Temp Temp Source Pulse Resp SpO2 Height Weight   01/02/21 1141 01/02/21 1141 01/02/21 1141 01/02/21 1141 01/02/21 1141 01/02/21 1141 01/02/21 1151 01/02/21 1151   (!) 140/88 97.3 °F (36.3 °C) Temporal 98 16 97 % 5' 2\" (1.575 m) 198 lb (89.8 kg)         General Appearance/Constitutional:  Alert, development consistent with age, NAD. HEENT:  NC/NT. PERRLA. Airway patent. Neck:  Normal ROM. Supple. Respiratory:  Clear to auscultation and breath sounds equal.  CV:  Regular rate and rhythm, normal heart sounds, without pathological murmurs, ectopy, gallops, or rubs. Chest:  Symmetrical without visible rash or tenderness. GI:  Abdomen Soft, nontender, good bowel sounds. No firm or pulsatile mass. Back:  No costovertebral tenderness. Extremities: No tenderness or edema noted. Lymphatics: no lymphadenopathy noted  Integument:  Normal turgor. Warm, dry, without visible rash, unless noted elsewhere. Neurological:  Oriented. Motor functions intact.    Psychiatric:  Affect normal.    Lab / Imaging Results   (All laboratory and radiology results have been personally reviewed by myself)  Labs:  Results for orders placed or performed during the hospital encounter of 01/02/21   CBC Auto Differential   Result Value Ref Range    WBC 5.1 4.5 - 11.5 E9/L    RBC 4.23 3.50 - 5.50 E12/L    Hemoglobin 13.0 11.5 - 15.5 g/dL    Hematocrit 40.3 34.0 - 48.0 %    MCV 95.3 80.0 - 99.9 fL    MCH 30.7 26.0 - 35.0 pg    MCHC 32.3 32.0 - 34.5 %    RDW 14.0 11.5 - 15.0 fL    Platelets 012 540 - 551 E9/L    MPV 10.2 7.0 - 12.0 fL    Neutrophils % 42.4 (L) 43.0 - 80.0 %    Immature Granulocytes % 1.8 0.0 - 5.0 %    Lymphocytes % 46.9 (H) 20.0 - 42.0 %    Monocytes % 7.1 2.0 - 12.0 %    Eosinophils % 1.0 0.0 - 6.0 %    Basophils % 0.8 0.0 - 2.0 %    Neutrophils Absolute 2.15 1.80 - 7.30 E9/L    Immature Granulocytes # 0.09 E9/L    Lymphocytes Absolute 2.38 1.50 - 4.00 E9/L    Monocytes Absolute 0.36 0.10 - 0.95 E9/L    Eosinophils Absolute 0.05 0.05 - 0.50 E9/L    Basophils Absolute 0.04 0.00 - 0.20 E9/L   Comprehensive Metabolic Panel w/ Reflex to MG   Result Value Ref Range    Sodium 139 132 - 146 mmol/L    Potassium reflex Magnesium 4.2 3.5 - 5.0 mmol/L    Chloride 102 98 - 107 mmol/L    CO2 28 22 - 29 mmol/L    Anion Gap 9 7 - 16 mmol/L    Glucose 167 (H) 74 - 99 mg/dL    BUN 16 6 - 20 mg/dL    CREATININE 0.8 0.5 - 1.0 mg/dL    GFR Non-African American >60 >=60 mL/min/1.73    GFR African American >60     Calcium 9.6 8.6 - 10.2 mg/dL    Total Protein 6.6 6.4 - 8.3 g/dL    Alb 4.1 3.5 - 5.2 g/dL    Total Bilirubin 0.3 0.0 - 1.2 mg/dL    Alkaline Phosphatase 53 35 - 104 U/L    ALT 15 0 - 32 U/L    AST 15 0 - 31 U/L   Lactic Acid, Plasma   Result Value Ref Range    Lactic Acid 0.9 0.5 - 2.2 mmol/L   Covid-19 Ambulatory   Result Value Ref Range    Source NP swab        Imaging: All Radiology results interpreted by Radiologist unless otherwise noted. XR CHEST PORTABLE   Final Result   Small lingular infiltrate. ED Course / Medical Decision Making     Medications   0.9 % sodium chloride bolus (has no administration in time range)        Re-Evaluations:  1/2/21      Time: 1325  Patients condition is improving. Consultations:             None    Procedures:   none    MDM:  At this time the patient is without objective evidence of an acute process requiring hospitalization or inpatient management. They have remained hemodynamically stable throughout their entire ED visit and are stable for discharge with outpatient follow-up. The plan has been discussed in detail and they are aware of the specific conditions for emergent return, as well as the importance of follow-up. Patient at this time was seen and evaluated by Dr. Carlo Guerrero. Patient will be placed on antibiotics including Omnicef with zinc and vitamin C for his symptoms. She was reswabbed for Covid chest x-ray does show that she has a linear pneumonia. She states that she will follow-up with her primary care physician she is nontoxic in appearance no distress and stable for outpatient follow-up.   Patient did ambulate without oxygen her pulse ox is good slightly winded but recovers immediately after sitting for several seconds    Plan of Care/Counseling:  I and emergency room physician reviewed today's visit with the patient in addition to providing specific details for the plan of care and counseling regarding the diagnosis and prognosis. Questions are answered at this time and are agreeable with the plan. Assessment    No diagnosis found. This patient's ED course included: a personal history and physicial examination  This patient has remained hemodynamically stable during their ED course. Plan   Discharge to home. Patient condition is good. New Medications     New Prescriptions    No medications on file     Electronically signed by REYNOLD Reynolds CNP   DD: 1/2/21  **This report was transcribed using voice recognition software. Every effort was made to ensure accuracy; however, inadvertent computerized transcription errors may be present.   END OF PROVIDER NOTE       REYNOLD Reynolds CNP  01/02/21 [id number]

## 2021-01-03 ENCOUNTER — CARE COORDINATION (OUTPATIENT)
Dept: CARE COORDINATION | Age: 46
End: 2021-01-03

## 2021-01-03 LAB — SARS-COV-2, PCR: NOT DETECTED

## 2021-01-04 ENCOUNTER — CARE COORDINATION (OUTPATIENT)
Dept: CARE COORDINATION | Age: 46
End: 2021-01-04

## 2021-01-04 DIAGNOSIS — G90.A POTS (POSTURAL ORTHOSTATIC TACHYCARDIA SYNDROME): ICD-10-CM

## 2021-01-04 DIAGNOSIS — R94.31 ABNORMAL EKG: Primary | ICD-10-CM

## 2021-01-04 NOTE — CARE COORDINATION
[id number] [facility] and left voicemail regarding Dietitian referral. Left call back number and will follow up as appropriate.        [address] [facility], [address] [place] [address]

## 2021-01-06 ENCOUNTER — TELEPHONE (OUTPATIENT)
Dept: ADMINISTRATIVE | Age: 46
End: 2021-01-06

## 2021-01-06 ENCOUNTER — OFFICE VISIT (OUTPATIENT)
Dept: PRIMARY CARE CLINIC | Age: 46
End: 2021-01-06
Payer: MEDICAID

## 2021-01-06 VITALS
HEART RATE: 100 BPM | WEIGHT: 198 LBS | RESPIRATION RATE: 20 BRPM | BODY MASS INDEX: 36.44 KG/M2 | TEMPERATURE: 98.6 F | SYSTOLIC BLOOD PRESSURE: 134 MMHG | OXYGEN SATURATION: 98 % | DIASTOLIC BLOOD PRESSURE: 76 MMHG | HEIGHT: 62 IN

## 2021-01-06 DIAGNOSIS — E11.9 TYPE 2 DIABETES MELLITUS WITHOUT COMPLICATION, WITH LONG-TERM CURRENT USE OF INSULIN (HCC): ICD-10-CM

## 2021-01-06 DIAGNOSIS — Z79.4 TYPE 2 DIABETES MELLITUS WITHOUT COMPLICATION, WITH LONG-TERM CURRENT USE OF INSULIN (HCC): ICD-10-CM

## 2021-01-06 DIAGNOSIS — J12.9 VIRAL PNEUMONIA: Primary | ICD-10-CM

## 2021-01-06 PROCEDURE — 3046F HEMOGLOBIN A1C LEVEL >9.0%: CPT | Performed by: INTERNAL MEDICINE

## 2021-01-06 PROCEDURE — 2022F DILAT RTA XM EVC RTNOPTHY: CPT | Performed by: INTERNAL MEDICINE

## 2021-01-06 PROCEDURE — G8427 DOCREV CUR MEDS BY ELIG CLIN: HCPCS | Performed by: INTERNAL MEDICINE

## 2021-01-06 PROCEDURE — 1036F TOBACCO NON-USER: CPT | Performed by: INTERNAL MEDICINE

## 2021-01-06 PROCEDURE — G8417 CALC BMI ABV UP PARAM F/U: HCPCS | Performed by: INTERNAL MEDICINE

## 2021-01-06 PROCEDURE — 99214 OFFICE O/P EST MOD 30 MIN: CPT | Performed by: INTERNAL MEDICINE

## 2021-01-06 PROCEDURE — G8482 FLU IMMUNIZE ORDER/ADMIN: HCPCS | Performed by: INTERNAL MEDICINE

## 2021-01-06 RX ORDER — PEN NEEDLE, DIABETIC 31 GX5/16"
NEEDLE, DISPOSABLE MISCELLANEOUS
COMMUNITY
Start: 2020-12-28 | End: 2021-02-25

## 2021-01-06 RX ORDER — METHYLPREDNISOLONE 4 MG/1
TABLET ORAL
COMMUNITY
Start: 2021-01-04 | End: 2021-02-25

## 2021-01-06 RX ORDER — CLARITHROMYCIN 500 MG/1
500 TABLET, COATED ORAL EVERY 12 HOURS
COMMUNITY
Start: 2021-01-04 | End: 2021-01-14

## 2021-01-06 RX ORDER — ZINC GLUCONATE 50 MG
TABLET ORAL
COMMUNITY
Start: 2021-01-02 | End: 2021-01-26 | Stop reason: ALTCHOICE

## 2021-01-06 RX ORDER — AMOXICILLIN AND CLAVULANATE POTASSIUM 875; 125 MG/1; MG/1
TABLET, FILM COATED ORAL
COMMUNITY
Start: 2021-01-03 | End: 2021-01-26 | Stop reason: ALTCHOICE

## 2021-01-06 RX ORDER — ASCORBIC ACID 500 MG
TABLET ORAL
COMMUNITY
Start: 2021-01-02

## 2021-01-06 RX ORDER — DIVALPROEX SODIUM 500 MG/1
TABLET, EXTENDED RELEASE ORAL
COMMUNITY
Start: 2020-12-24

## 2021-01-06 RX ORDER — POLYETHYLENE GLYCOL 3350 17 G/17G
POWDER, FOR SOLUTION ORAL
COMMUNITY
Start: 2020-12-28 | End: 2021-01-26 | Stop reason: ALTCHOICE

## 2021-01-06 ASSESSMENT — ENCOUNTER SYMPTOMS
COLOR CHANGE: 0
ABDOMINAL PAIN: 0
EYE ITCHING: 0
NAUSEA: 0
EYE DISCHARGE: 0
WHEEZING: 0
SHORTNESS OF BREATH: 1
DIARRHEA: 0
CHEST TIGHTNESS: 0
EYE PAIN: 0
SORE THROAT: 0
TROUBLE SWALLOWING: 0
COUGH: 1
BLOOD IN STOOL: 0
FACIAL SWELLING: 0
ANAL BLEEDING: 0
VOMITING: 0
STRIDOR: 0
PHOTOPHOBIA: 0
CONSTIPATION: 0
RHINORRHEA: 0

## 2021-01-06 NOTE — TELEPHONE ENCOUNTER
Patient Appointment Form:      PCP: Tee Baron  Referring: pcp    Has the Patient:    Seen a Cardiologist? yes    date:2017  [de-identified]  location:[facility]    Had a heart catheterization? yes   date: 2008  Hospital:  [facility]    Had heart surgery? no    Had a stress test or nuclear stress test? yes   date: 2017   [address]ility name:  [address]    Had an echocardiogram? yes   date: 2017   facility name:  [address]    Had a vascular ultrasound? yes   date: 2017   facility name:      Had a 24/48 heart monitor or extended cardiac event monitor?  24hr   date: 2017      Who ordered: Dana Bustos    Had recent blood work in the last 6 months? yes    date: 1/2021    ordering physician: [facility]    Had a pacemaker/ICD/ILR implant? no    Seen an Electrophysiologist? no        Will send records via: fax      Date & time of appointment:  1/28/21 Dr Sherman Samples

## 2021-01-06 NOTE — ASSESSMENT & PLAN NOTE
Finish up her double antibiotic. Take probiotic. Watch for diarrhea or abdominal pain suggestive of C. difficile. Recheck chest x-ray at the end of the month.   Follow-up with her immunologist and pulmonologist as directed

## 2021-01-06 NOTE — PROGRESS NOTES
2021    Name: Julio Koehler : 1975 Sex: female  Age: 39 y.o. Subjective:  Chief Complaint   Patient presents with    Pneumonia        Pneumonia  She complains of cough and shortness of breath. There is no wheezing. This is a recurrent problem. The current episode started 1 to 4 weeks ago. The problem occurs 2 to 4 times per day. The problem has been gradually improving. The cough is productive of sputum and hacking. Associated symptoms include nasal congestion. Pertinent negatives include no appetite change, chest pain, ear pain, headaches, myalgias, rhinorrhea, sore throat or trouble swallowing. Her symptoms are aggravated by minimal activity. Her symptoms are alleviated by oral steroids and change in position. She reports moderate improvement on treatment. Her symptoms are not alleviated by OTC cough suppressant, beta-agonist and prescription cough suppressant. There are no known risk factors for lung disease. Her past medical history is significant for asthma. Patient has quite a lengthy medical history and can be found in her previous office visit. Medications reviewed and stay the same    She saw her pulmonary doctor at [facility]. Reviewed her reports. She is getting a 2-week documentation on her CPAP for obstructive sleep apnea.  is with her and gives part of her history. .    Last week she started having increasing shortness of breath, says her saturations will drop to about 79. She found her old CPAP and actually got an auto CPAP set up so she could use it. He is not on oxygen. She was wheezing and coughing up some yellowish phlegm so she went to [address] [place] [address] emergency room on  where a CTA of her chest showed lingular infiltrate and some probable groundglass opacities consistent with viral pneumonia. She had been on azithromycin at that point. They sent her home with some over-the-counter medications.   She did not improve she was more short of breath she was coughing more pulse oximetry kept going up and down so on Saturday, 1/2/2021 she presented back to [facility] emergency room that time she had another physician put her on cefdinir which she is allergic to apparently because she developed itchy rash and her throat started to swell. She took some liquid Benadryl and that improved her symptoms. Chest x-ray done at that time showed lingular infiltrate. She contacted her pulmonologist and her immunologist who decided to put her on a double antibiotic she has been on Biaxin 500 mg twice daily and Augmentin 875 twice daily starting on 1/4/2021 and to be taken for 10 days. Her pulmonologist also put her on decreasing doses of Medrol. He has scheduled to start at 24 mg and goes down over 35 days. She says her blood sugars are not too bad in the 200-300 range and she adjusts her insulin doses according to her sliding scale. She has an appointment with her new endocrinologist  in the next few months    I assured patient that viral pneumonias usually need to run their course. She should get a repeat chest x-ray in about 3 weeks which will be about 3 weeks  since her last one  She is still on her same medications. Reviewed reports from both ED visits. Reviewed CTA and chest x-ray films. tReview of Systems   Constitutional: Negative for appetite change, fatigue and unexpected weight change. HENT: Negative for congestion, ear pain, facial swelling, rhinorrhea, sore throat, tinnitus and trouble swallowing. Eyes: Negative for photophobia, pain, discharge, itching and visual disturbance. Respiratory: Positive for cough and shortness of breath. Negative for chest tightness, wheezing and stridor. Cardiovascular: Negative for chest pain, palpitations and leg swelling. Gastrointestinal: Negative for abdominal pain, anal bleeding, blood in stool, constipation, diarrhea, nausea and vomiting.    Endocrine: Negative for cold intolerance, heat intolerance, 150-200- 4 units, then add 2 units for every 50 up to a max of 12 units. , Disp: , Rfl:     Probiotic Product (PROBIOTIC DAILY PO), , Disp: , Rfl:     propranolol (INDERAL) 10 MG tablet, Take 1 tablet by mouth 3 times daily, Disp: 90 tablet, Rfl: 3    furosemide (LASIX) 20 MG tablet, Take 1 tablet by mouth daily, Disp: 60 tablet, Rfl: 3    aspirin 81 MG EC tablet, Take 81 mg by mouth daily, Disp: , Rfl:     atorvastatin (LIPITOR) 10 MG tablet, Take 1 tablet by mouth daily, Disp: 90 tablet, Rfl: 1    levothyroxine (SYNTHROID) 50 MCG tablet, Take 2.5 tablets by mouth Five times weekly Given Monday,Tuesday,Wednesday,Thursday,Friday, Disp: 90 tablet, Rfl: 1    levothyroxine (SYNTHROID) 50 MCG tablet, Take 2 tablets by mouth Twice a Week Given Saturday,Sunday, Disp: 24 tablet, Rfl: 1    pantoprazole (PROTONIX) 40 MG tablet, Take 1 tablet by mouth every morning (before breakfast), Disp: 90 tablet, Rfl: 1    folic acid (FOLVITE) 1 MG tablet, Take 1 tablet by mouth Daily with lunch, Disp: 90 tablet, Rfl: 1    acetaminophen (TYLENOL) 325 MG tablet, Take 650 mg by mouth every 6 hours as needed for Pain, Disp: , Rfl:     sodium chloride (OCEAN, BABY AYR) 0.65 % nasal spray, 1 spray by Nasal route 4 times daily as needed for Congestion, Disp: , Rfl:     azelastine (ASTELIN) 0.1 % nasal spray, 1 spray by Nasal route 2 times daily, Disp: , Rfl:     diphenhydrAMINE (BENYLIN) 12.5 MG/5ML liquid, Take 25 mg by mouth 4 times daily as needed for Allergies (migrane break through), Disp: , Rfl:     Artificial Saliva (BIOTENE DRY MOUTH MOISTURIZING MT), Take 1 spray by mouth every 3 hours, Disp: , Rfl:     clotrimazole (LOTRIMIN) 1 % cream, Apply topically 2 times daily as needed Under breast and abdominal folds, Disp: , Rfl:     OIL OF OREGANO PO, Take 60 mg by mouth daily (with breakfast), Disp: , Rfl:     miconazole (MICOTIN) 2 % powder, Apply topically daily Apply to groin, under breast and skin folds. , Disp: , Rfl:   magnesium hydroxide (MILK OF MAGNESIA) 400 MG/5ML suspension, Take 30 mLs by mouth 4 times daily as needed for Constipation, Disp: , Rfl:     SENNA LEAVES PO, Take 470 mg by mouth three times daily , Disp: , Rfl:     promethazine (PHENERGAN) 25 MG tablet, Take 25 mg by mouth every 6 hours as needed for Nausea, Disp: , Rfl:     triamcinolone (KENALOG) 0.1 % cream, Apply topically daily as needed (rash), Disp: , Rfl:     NONFORMULARY, Take 2 capsules by mouth 2 times daily (with meals) Turmeric Ginger, Disp: , Rfl:     levomilnacipran (FETZIMA) 40 MG CP24 extended release capsule, Take 40 mg by mouth daily, Disp: , Rfl:     Galcanezumab-gnlm (EMGALITY) 120 MG/ML SOAJ, Inject 120 mg into the skin every 30 days , Disp: , Rfl:     clonazePAM (KLONOPIN) 0.5 MG tablet, Take 0.5 mg by mouth 2 times daily. ., Disp: , Rfl:     divalproex (DEPAKOTE) 250 MG DR tablet, Take 500 mg by mouth See Admin Instructions 250 mg -  mg - Afternoon 500 mg - PM, Disp: , Rfl:     econazole nitrate 1 % cream, Apply topically daily as needed (Apply under breasts and folds) , Disp: , Rfl:     fluticasone (FLONASE) 50 MCG/ACT nasal spray, 1 spray by Nasal route 2 times daily , Disp: , Rfl:     fluticasone-salmeterol (ADVAIR HFA) 230-21 MCG/ACT inhaler, Inhale 2 puffs into the lungs 2 times daily, Disp: , Rfl:     ipratropium-albuterol (DUONEB) 0.5-2.5 (3) MG/3ML SOLN nebulizer solution, Inhale 1 vial into the lungs every 4 hours as needed for Shortness of Breath , Disp: , Rfl:     montelukast (SINGULAIR) 10 MG tablet, Take 10 mg by mouth nightly, Disp: , Rfl:     ondansetron (ZOFRAN-ODT) 8 MG TBDP disintegrating tablet, Take 8 mg by mouth every 8 hours as needed for Nausea or Vomiting , Disp: , Rfl:     ibuprofen (ADVIL;MOTRIN) 800 MG tablet, Take 1 tablet by mouth 2 times daily as needed for Pain, Disp: 20 tablet, Rfl: 0    rOPINIRole (REQUIP) 0.5 MG tablet, Take 1 tablet by mouth See Admin Instructions 1 tablet - breakfast 2 tablets - supper (Patient taking differently: Take 0.5 mg by mouth See Admin Instructions 1 tablet qAM and 2 tablets qPM), Disp: 270 tablet, Rfl: 1    potassium chloride (KLOR-CON) 20 MEQ packet, Take 20 mEq by mouth daily, Disp: 90 packet, Rfl: 2     Allergies   Allergen Reactions    Diamox [Acetazolamide] Shortness Of Breath and Rash    Abilify [Aripiprazole]      Fevers and Tremors    Aspartame And Phenylalanine Diarrhea     Blood sugar Les, Diarrhea    Cefdinir      Itchy throat    Cymbalta [Duloxetine Hcl]     Darvon [Propoxyphene]      darvocet    Dilaudid [Hydromorphone Hcl]     Doxepin     Doxycycline     Duloxetine      Other reaction(s): suicidal ideation    Effexor [Venlafaxine]     Gluten Meal Other (See Comments)    Grapeseed Extract [Nutritional Supplements]      grapes    Hydrochlorothiazide     Iron     Levaquin [Levofloxacin In D5w]     Meclizine Swelling and Other (See Comments)    Milk-Related Compounds      Dairy    Other      strawberries    Pecan Nut (Diagnostic)     Prochlorperazine Other (See Comments)    Red Dye     Reglan [Metoclopramide]     Rocephin [Ceftriaxone]     Rosemary Oil     Sulfa Antibiotics     Tetracyclines & Related     Topamax [Topiramate]     Tramadol     Triptans     [place] [Macadamia Nut Oil]     Zithromax [Azithromycin]     Adhesive Tape Rash    Betamethasone Nausea And Vomiting    Wheat Bran Dermatitis, Itching, Nausea And Vomiting and Rash        Past Medical History:   Diagnosis Date    Abnormal Pap smear of cervix     hpv age 23   [facility] Anemia     Anxiety     Asthma     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     Connective tissue disease (HCC)     Cyst of right breast     DDD (degenerative disc disease), cervical     Depression     Diabetes mellitus ([facility]     Dysphagia     Fibromyalgia     Gastroparesis     GERD (gastroesophageal reflux disease)     Headache     Hematuria     Hyperlipidemia     Hypertension     Hypothyroidism     IBS (irritable bowel syndrome)     with constipation and diarrhea    Immune deficiency disorder ([address]     Insomnia     Kidney stones     Meningitis     x4--- twice after IVIG     PAULINE on CPAP     cpap dependence    PCOS (polycystic ovarian syndrome)     POTS (postural orthostatic tachycardia syndrome)     Pseudotumor cerebri     in 2008--- treated with LP; allergy to diamox    Raynaud's disease     Rectal bleeding     RLS (restless legs syndrome)     Scleroderma ([address]     Sjogren's disease ([address]     Thyroid nodule     TIA (transient ischemic attack)        Health Maintenance Due   Topic Date Due    Hepatitis C screen  1975    Diabetic foot exam  12/18/1987    Diabetic retinal exam  12/18/1987    HIV screen  12/18/1992    Diabetic microalbuminuria test  12/18/1995    Hepatitis B vaccine (1 of 3 - Risk 3-dose series) 12/18/1996    Cervical cancer screen  12/18/1998        Patient Active Problem List   Diagnosis    Vision loss, bilateral    Type 2 diabetes mellitus without complication, with long-term current use of insulin ([address]    Thyroid nodule    Solitary cyst of right breast    Sjogren's syndrome with keratoconjunctivitis sicca ([address]    Retention of urine    Restless legs    Raynaud's phenomenon without gangrene    Primary fibromyalgia syndrome    Postural orthostatic tachycardia syndrome    PAULINE on CPAP    Obesity, Class II, BMI 35-39.9    Hyperlipidemia    Other dysphagia    Lung nodule    Long term current use of insulin (HCC)    Insomnia    Intractable chronic migraine without aura and without status migrainosus    Hypothyroidism    Hypogammaglobulinemia ([address]    History of renal calculi    Gastroparesis    Gastroesophageal reflux disease    Essential hypertension    DDD (degenerative disc disease)    CVID (common variable immunodeficiency) (HCC)    Migraine    Irritable bowel syndrome    Constipation    Diarrhea    Benign intracranial hypertension    Depression    Anxiety    Anemia    Polycystic disease, ovaries    Cervicalgia    Asthma    Connective tissue disorder (HCC)    Stroke-like symptoms    TIA (transient ischemic attack)    Weakness of extremity    Subcutaneous nodule of abdominal wall    Flu vaccine need    Lower abdominal pain    Acute upper respiratory infection    Acute urinary tract infection    Optic neuritis    Immune deficiency disorder (HCC)    Wrist sprain, right, subsequent encounter    Viral pneumonia        Past Surgical History:   Procedure Laterality Date    APPENDECTOMY      CARPAL TUNNEL RELEASE Bilateral     CHOLECYSTECTOMY      HYSTERECTOMY      PORT SURGERY      x3    PYLOROPLASTY          Family History   Problem Relation Age of Onset    Alzheimer's Disease Mother     Alzheimer's Disease Father     Stroke Sister     Osteoporosis Sister     Hypertension Brother     Alzheimer's Disease Paternal Aunt     Alzheimer's Disease Paternal Uncle         Social History     Tobacco Use    Smoking status: Never Smoker    Smokeless tobacco: Never Used   Substance Use Topics    Alcohol use: No     Frequency: Never    Drug use: No        Objective  Vitals:    01/06/21 1556   BP: 134/76   Pulse: 100   Resp: 20   Temp: 98.6 °F (37 °C)   TempSrc: Temporal   SpO2: 98%   Weight: 198 lb (89.8 kg)   Height: 5' 2\" (1.575 m)        Exam:  Physical Exam  Vitals signs reviewed. Exam conducted with a chaperone present. Constitutional:       General: She is not in acute distress. Appearance: Normal appearance. She is well-developed. She is obese. She is not ill-appearing. HENT:      Head: Normocephalic and atraumatic. Right Ear: External ear normal.      Left Ear: External ear normal.      Nose: Nose normal.      Mouth/Throat:      Pharynx: No oropharyngeal exudate. Eyes:      General: No scleral icterus. Right eye: No discharge. Left eye: No discharge. Conjunctiva/sclera: Conjunctivae normal.      Pupils: Pupils are equal, round, and reactive to light. Neck:      Musculoskeletal: Normal range of motion and neck supple. Thyroid: No thyromegaly. Cardiovascular:      Rate and Rhythm: Normal rate and regular rhythm. Heart sounds: Normal heart sounds. No murmur. No friction rub. No gallop. Pulmonary:      Effort: Pulmonary effort is normal. No respiratory distress. Breath sounds: No wheezing or rales. Comments: Decreased breath sounds  Chest:      Chest wall: No tenderness. Abdominal:      General: Bowel sounds are normal. There is no distension. Palpations: Abdomen is soft. There is no mass. Tenderness: There is no abdominal tenderness. There is no guarding or rebound. Musculoskeletal: Normal range of motion. General: Swelling and tenderness present. No deformity. Comments: 1+ edema of the dorsum of her right hand. Capillary refill normal. Ulnar and radial pulses normal. Decreased sensation to light touch of her entire hand from mid forearm down. Unable to make a fist using her right hand. Tenderness to palpation of her right hand and lower forearm   Lymphadenopathy:      Cervical: No cervical adenopathy. Skin:     General: Skin is warm and dry. Coloration: Skin is not jaundiced or pale. Findings: No erythema or rash. Neurological:      Mental Status: She is alert and oriented to person, place, and time. Cranial Nerves: No cranial nerve deficit. Sensory: No sensory deficit. Motor: No weakness. Deep Tendon Reflexes: Reflexes normal.   Psychiatric:         Mood and Affect: Mood normal.         Behavior: Behavior normal.         Thought Content: Thought content normal.         Judgment: Judgment normal.          Last labs reviewed. ASSESSMENT & PLAN :   Problem List        Respiratory    Viral pneumonia - Primary     Finish up her double antibiotic. Take probiotic.   Watch for

## 2021-01-06 NOTE — TELEPHONE ENCOUNTER
Pt was seen in past by F physician - records should be available through [facility].   Appt scheduled with Dr. William Lau on 1/28/21 7:30 am.

## 2021-01-07 ENCOUNTER — CARE COORDINATION (OUTPATIENT)
Dept: CARE COORDINATION | Age: 46
End: 2021-01-07

## 2021-01-07 NOTE — TELEPHONE ENCOUNTER
New patient packet mailed. Notes are in [address], [address], and [facility] hospital faxing Cath report.

## 2021-01-08 ENCOUNTER — CARE COORDINATION (OUTPATIENT)
Dept: CARE COORDINATION | Age: 46
End: 2021-01-08

## 2021-01-08 NOTE — CARE COORDINATION
ACM attempted to contact patient to follow up on her status.  [facility] PLAN  Continue to attempt to contact patient.

## 2021-01-10 ENCOUNTER — TELEPHONE (OUTPATIENT)
Dept: PRIMARY CARE CLINIC | Age: 46
End: 2021-01-10

## 2021-01-10 DIAGNOSIS — H54.7 LOSS OF VISION: Primary | ICD-10-CM

## 2021-01-10 DIAGNOSIS — Z48.89 OBSERVATION AFTER SURGERY: ICD-10-CM

## 2021-01-10 DIAGNOSIS — M19.91 PRIMARY OSTEOARTHRITIS, UNSPECIFIED SITE: ICD-10-CM

## 2021-01-10 NOTE — TELEPHONE ENCOUNTER
Error. Please send physical therapy evaluation report to [facility] not [facility].   Fax number on encounter

## 2021-01-11 ENCOUNTER — TELEPHONE (OUTPATIENT)
Dept: PRIMARY CARE CLINIC | Age: 46
End: 2021-01-11

## 2021-01-11 NOTE — TELEPHONE ENCOUNTER
Zohreh with [facility] called and wanted Dr. Can Casey to be aware that they will not be able to see Irwin Rushing due to not taking her insurance.

## 2021-01-12 ENCOUNTER — TELEPHONE (OUTPATIENT)
Dept: PRIMARY CARE CLINIC | Age: 46
End: 2021-01-12

## 2021-01-18 ENCOUNTER — CARE COORDINATION (OUTPATIENT)
Dept: CARE COORDINATION | Age: 46
End: 2021-01-18

## 2021-01-18 NOTE — CARE COORDINATION
ACM attempted to contact patient to review her status.  stated she was on the phone and unable to talk at this time. ACM asked if patient could please return this call. PLAN  -If no return call, send trying to reach letter and continue to attempt to contact patient.

## 2021-01-18 NOTE — LETTER
January 18, 2021    Jina Palacios  [facility] [address]      Dear Mack Jansen:    I have been unable to reach you by phone for our care coordination follow up telephone call. I have missed our conversations and am interested in your health. Please call me at [phone], so that we can continue to work together to improve your health. If you have any questions or concerns, please don't hesitate to call.     Sincerely,        Israel Jung RN

## 2021-01-20 ENCOUNTER — OFFICE VISIT (OUTPATIENT)
Dept: CARDIOLOGY CLINIC | Age: 46
End: 2021-01-20
Payer: MEDICAID

## 2021-01-20 ENCOUNTER — TELEPHONE (OUTPATIENT)
Dept: CARDIOLOGY | Age: 46
End: 2021-01-20

## 2021-01-20 VITALS
HEART RATE: 90 BPM | WEIGHT: 194.6 LBS | SYSTOLIC BLOOD PRESSURE: 112 MMHG | HEIGHT: 62 IN | RESPIRATION RATE: 18 BRPM | DIASTOLIC BLOOD PRESSURE: 70 MMHG | BODY MASS INDEX: 35.81 KG/M2

## 2021-01-20 DIAGNOSIS — E78.5 HYPERLIPIDEMIA, UNSPECIFIED HYPERLIPIDEMIA TYPE: Primary | ICD-10-CM

## 2021-01-20 DIAGNOSIS — R06.09 DOE (DYSPNEA ON EXERTION): ICD-10-CM

## 2021-01-20 DIAGNOSIS — R94.31 ABNORMAL EKG: ICD-10-CM

## 2021-01-20 PROCEDURE — G8427 DOCREV CUR MEDS BY ELIG CLIN: HCPCS | Performed by: INTERNAL MEDICINE

## 2021-01-20 PROCEDURE — 1036F TOBACCO NON-USER: CPT | Performed by: INTERNAL MEDICINE

## 2021-01-20 PROCEDURE — 93000 ELECTROCARDIOGRAM COMPLETE: CPT | Performed by: INTERNAL MEDICINE

## 2021-01-20 PROCEDURE — G8482 FLU IMMUNIZE ORDER/ADMIN: HCPCS | Performed by: INTERNAL MEDICINE

## 2021-01-20 PROCEDURE — G8417 CALC BMI ABV UP PARAM F/U: HCPCS | Performed by: INTERNAL MEDICINE

## 2021-01-20 PROCEDURE — 99204 OFFICE O/P NEW MOD 45 MIN: CPT | Performed by: INTERNAL MEDICINE

## 2021-01-20 NOTE — TELEPHONE ENCOUNTER
SCHEDULED ECHO FOR 01-27-21. REVIEWED COVID CHECKLIST WITH PATIENT.     Electronically signed by Milagro Sanders on 1/20/2021 at 11:50 AM

## 2021-01-20 NOTE — PROGRESS NOTES
CHIEF COMPLAINT: YADAV/POTS/Abnormal EKG    HISTORY OF PRESENT ILLNESS: Patient is a 39 y.o. female seen at the request of [address] [place] [address]. Patient seen for abnormal EKG and YADAV. Hx of POTS and multiple risk factors including DM. No overt CP.     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     Connective tissue disease (HCC)     Cyst of right breast     DDD (degenerative disc disease), cervical     Depression     Diabetes mellitus (HCC)     Dysphagia     Fibromyalgia     Gastroparesis     GERD (gastroesophageal reflux disease)     Headache     Hematuria     Hyperlipidemia     Hypertension     Hypothyroidism     IBS (irritable bowel syndrome)     with constipation and diarrhea    Immune deficiency disorder (HCC)     Insomnia     Kidney stones     Meningitis     x4--- twice after IVIG     PAULINE on CPAP     cpap dependence    PCOS (polycystic ovarian syndrome)     POTS (postural orthostatic tachycardia syndrome)     Pseudotumor cerebri     in 2008--- treated with LP; allergy to diamox    Raynaud's disease     Rectal bleeding     RLS (restless legs syndrome)     Scleroderma ([address]     Sjogren's disease ([address]     Thyroid nodule     TIA (transient ischemic attack)        Patient Active Problem List   Diagnosis    Vision loss, bilateral    Type 2 diabetes mellitus without complication, with long-term current use of insulin (HCC)    Thyroid nodule    Solitary cyst of right breast    Sjogren's syndrome with keratoconjunctivitis sicca ([address]    Retention of urine    Restless legs    Raynaud's phenomenon without gangrene    Primary fibromyalgia syndrome    Postural orthostatic tachycardia syndrome    PAULINE on CPAP    Obesity, Class II, BMI 35-39.9    Hyperlipidemia    Other dysphagia    Lung nodule    Long term current use of insulin ([address]    Insomnia    Intractable chronic migraine without aura and without status migrainosus    Hypothyroidism    Hypogammaglobulinemia (HCC)    History of renal calculi    Gastroparesis    Gastroesophageal reflux disease    Essential hypertension    DDD (degenerative disc disease)    [facility]ID [facility]common [facility] immunodeficiency) [facility]    Migraine    Irritable bowel syndrome    Constipation    Diarrhea    Benign intracranial hypertension    Depression    Anxiety    Anemia    Polycystic disease, ovaries    Cervicalgia    Asthma    Connective tissue disorder ([facility])    Stroke-like symptoms    TIA (transient ischemic attack)    Weakness of extremity    Subcutaneous nodule of abdominal wall    Flu vaccine need    Lower abdominal pain    Acute upper respiratory infection    Acute urinary tract infection    Optic neuritis    Immune deficiency disorder ([facility])    Wrist sprain, right, subsequent encounter    Viral pneumonia       Allergies   Allergen Reactions    Diamox [Acetazolamide] Shortness Of Breath and Rash    Abilify [Aripiprazole]      Fevers and Tremors    Aspartame And Phenylalanine Diarrhea     Blood sugar Les, Diarrhea    Cefdinir      Itchy throat    Cymbalta [Duloxetine Hcl]     Darvon [Propoxyphene]      darvocet    Dilaudid [Hydromorphone Hcl]     Doxepin     Doxycycline     Duloxetine      Other reaction(s): suicidal ideation    Effexor [Venlafaxine]     Gluten Meal Other (See Comments)    Grapeseed Extract [Nutritional Supplements]      grapes    Hydrochlorothiazide     Iron     Levaquin [Levofloxacin In D5w]     Meclizine Swelling and Other (See Comments)    Milk-Related Compounds      Dairy    Other      strawberries    Pecan Nut (Diagnostic)     Prochlorperazine Other (See Comments)    Red Dye     Reglan [Metoclopramide]     Rocephin [Ceftriaxone]     Rosemary Oil     Sulfa Antibiotics     Tetracyclines & Related     Topamax [Topiramate]     Tramadol     Triptans     [place] [Macadamia Nut Oil]     Zithromax [Azithromycin]     Adhesive Tape Rash    Betamethasone Nausea And Vomiting    Wheat Bran Dermatitis, Itching, Nausea And Vomiting and Rash       Current Outpatient Medications   Medication Sig Dispense Refill    Lactobacillus (BIOTINEX PO) Take by mouth      ascorbic acid (VITAMIN C) 500 MG tablet TAKE 1 TABLET BY MOUTH EVERY DAY      zinc 50 MG TABS tablet TAKE 1 TABLET BY MOUTH EVERY DAY      polyethylene glycol (GLYCOLAX) 17 GM/SCOOP powder MIX 17 GRAMS IN BEVERAGE AND DRINK TWICE DAILY AS NEEDED FOR CONSTIPATION      methylPREDNISolone (MEDROL) 4 MG tablet Take by mouth daily. 6 tab - 5 days; 5 tab - 5 days; 4 tab - 5 days; 3 tab - 5 days; 2 tab - 5 days; 1 tab - 5 days; STOP      B-D ULTRAFINE III SHORT PEN 31G X 8 MM MISC USE WITH INSULIN SHOT FOUR TIMES DAILY      divalproex (DEPAKOTE ER) 500 MG extended release tablet TAKE 1 TABLET BY MOUTH THREE TIMES DAILY      diclofenac sodium (VOLTAREN) 1 % GEL Apply 2 g topically 4 times daily      Ascorbic Acid (VITAMIN C) 500 MG CAPS Take 1 tablet by mouth daily 30 capsule 0    zinc 50 MG CAPS Take 50 mg by mouth daily 30 capsule 0    ondansetron (ZOFRAN ODT) 4 MG disintegrating tablet Take 1 tablet by mouth every 8 hours as needed for Nausea or Vomiting 10 tablet 0    polyethylene glycol (GLYCOLAX) 17 g packet Take 17 g by mouth 2 times daily as needed for Constipation 527 g 2    glucose monitoring kit (FREESTYLE) monitoring kit 1 kit by Does not apply route daily 1 kit 0    blood glucose monitor strips Test 4 times a day & as needed for symptoms of irregular blood glucose. Dispense sufficient amount for indicated testing frequency plus additional to accommodate PRN testing needs.  260 strip 1    Lancets MISC 1 each by Does not apply route 4 times daily 200 each 0    tiZANidine (ZANAFLEX) 2 MG tablet Take 1 tablet by mouth 2 times daily as needed (muscle) 180 tablet 2    Polyvinyl Alcohol-Povidone (REFRESH OP) Apply to eye Artificial tears      insulin glargine (LANTUS SOLOSTAR) 100 UNIT/ML injection pen Injects 4 units in the morning and 14 units at night      ketorolac (TORADOL) 10 MG tablet Take 1 tablet by mouth every 6 hours as needed for Pain 20 tablet 0    insulin lispro, 1 Unit Dial, (HUMALOG KWIKPEN) 100 UNIT/ML SOPN Inject into the skin daily Sliding scale: 150-200- 4 units, then add 2 units for every 50 up to a max of 12 units.       Probiotic Product (PROBIOTIC DAILY PO)       propranolol (INDERAL) 10 MG tablet Take 1 tablet by mouth 3 times daily 90 tablet 3    furosemide (LASIX) 20 MG tablet Take 1 tablet by mouth daily 60 tablet 3    aspirin 81 MG EC tablet Take 81 mg by mouth daily      rOPINIRole (REQUIP) 0.5 MG tablet Take 1 tablet by mouth See Admin Instructions 1 tablet - breakfast  2 tablets - supper (Patient taking differently: Take 0.5 mg by mouth See Admin Instructions 1 tablet qAM and 2 tablets qPM) 270 tablet 1    levothyroxine (SYNTHROID) 50 MCG tablet Take 2.5 tablets by mouth Five times weekly Given Monday,Tuesday,Wednesday,Thursday,Friday 90 tablet 1    levothyroxine (SYNTHROID) 50 MCG tablet Take 2 tablets by mouth Twice a Week Given Saturday,Sunday 24 tablet 1    pantoprazole (PROTONIX) 40 MG tablet Take 1 tablet by mouth every morning (before breakfast) 90 tablet 1    folic acid (FOLVITE) 1 MG tablet Take 1 tablet by mouth Daily with lunch 90 tablet 1    acetaminophen (TYLENOL) 325 MG tablet Take 650 mg by mouth every 6 hours as needed for Pain      sodium chloride (OCEAN, BABY AYR) 0.65 % nasal spray 1 spray by Nasal route 4 times daily as needed for Congestion      azelastine (ASTELIN) 0.1 % nasal spray 1 spray by Nasal route 2 times daily      diphenhydrAMINE (BENYLIN) 12.5 MG/5ML liquid Take 25 mg by mouth 4 times daily as needed for Allergies (migrane break through)      Artificial Saliva (BIOTENE DRY MOUTH MOISTURIZING MT) Take 1 spray by mouth every 3 hours      clotrimazole (LOTRIMIN) 1 % cream Apply topically 2 times daily as needed Under breast and abdominal folds      OIL OF OREGANO PO Take 60 mg by mouth daily (with breakfast)      miconazole (MICOTIN) 2 % powder Apply topically daily Apply to groin, under breast and skin folds.  magnesium hydroxide (MILK OF MAGNESIA) 400 MG/5ML suspension Take 30 mLs by mouth 4 times daily as needed for Constipation      SENNA LEAVES PO Take 470 mg by mouth three times daily       promethazine (PHENERGAN) 25 MG tablet Take 25 mg by mouth every 6 hours as needed for Nausea      NONFORMULARY Take 2 capsules by mouth 2 times daily (with meals) Turmeric Ginger      potassium chloride (KLOR-CON) 20 MEQ packet Take 20 mEq by mouth daily 90 packet 2    levomilnacipran (FETZIMA) 40 MG CP24 extended release capsule Take 40 mg by mouth daily      Galcanezumab-gnlm (EMGALITY) 120 MG/ML SOAJ Inject 120 mg into the skin every 30 days       clonazePAM (KLONOPIN) 0.5 MG tablet Take 0.5 mg by mouth 2 times daily. Isaac Leonard econazole nitrate 1 % cream Apply topically daily as needed (Apply under breasts and folds)       fluticasone (FLONASE) 50 MCG/ACT nasal spray 1 spray by Nasal route 2 times daily       fluticasone-salmeterol (ADVAIR HFA) 230-21 MCG/ACT inhaler Inhale 2 puffs into the lungs 2 times daily      ipratropium-albuterol (DUONEB) 0.5-2.5 (3) MG/3ML SOLN nebulizer solution Inhale 1 vial into the lungs every 4 hours as needed for Shortness of Breath       montelukast (SINGULAIR) 10 MG tablet Take 10 mg by mouth nightly      ondansetron (ZOFRAN-ODT) 8 MG TBDP disintegrating tablet Take 8 mg by mouth every 8 hours as needed for Nausea or Vomiting       amoxicillin-clavulanate (AUGMENTIN) 875-125 MG per tablet TAKE 1 TABLET BY MOUTH EVERY 12 HOURS      albuterol sulfate HFA (PROVENTIL HFA) 108 (90 Base) MCG/ACT inhaler Inhale 2 puffs into the lungs every 4 hours as needed for Wheezing (Patient not taking: Reported on 1/20/2021) 1 Inhaler zero    ibuprofen (ADVIL;MOTRIN) 800 MG tablet Take 1 tablet by mouth 2 times daily as needed for Pain 20 tablet 0    Milk Thistle 150 MG CAPS Take by mouth 1 daily      BENZONATATE PO 1 tid prn      Magnesium 500 MG TABS Take 1 tablet by mouth 2 times daily      atorvastatin (LIPITOR) 10 MG tablet Take 1 tablet by mouth daily (Patient not taking: Reported on 1/20/2021) 90 tablet 1    triamcinolone (KENALOG) 0.1 % cream Apply topically daily as needed (rash)      divalproex (DEPAKOTE) 250 MG DR tablet Take 500 mg by mouth See Admin Instructions 250 mg -  mg - Afternoon 500 mg - PM       No current facility-administered medications for this visit. Social History     Socioeconomic History    Marital status:      Spouse name: Not on file    Number of children: 0    Years of education: 12    Highest education level: Bachelor's degree (e.g., BA, AB, BS)   Occupational History    Not on file   Social Needs    Financial resource strain: Not very hard    Food insecurity     Worry: Never true     Inability: Never true   [employer] needs     Medical: Yes     Non-medical: No   Tobacco Use    Smoking status: Never Smoker    Smokeless tobacco: Never Used   Substance and Sexual Activity    Alcohol use: No     Frequency: Never    Drug use: No    Sexual activity: Not Currently     Partners: Male   Lifestyle    Physical activity     Days per week: 7 days     Minutes per session: 20 min    Stress: To some extent   Relationships    Social connections     Talks on phone:  Three times a week     Gets together: Never     Attends [employer] service: More than 4 times per year     Active member of club or organization: No     Attends meetings of clubs or organizations: Never     Relationship status:     Intimate partner violence     Fear of current or ex partner: Not on file     Emotionally abused: Not on file     Physically abused: Not on file Forced sexual activity: Not on file   Other Topics Concern    Not on file   Social History Narrative    Patient is trying to exercise by walking her new puppy around the house. Plan is for puppy to become her support/seeing eye dog. [employer] will assist in training after puppy learns the basics. Patient does not visit with friends or relatives d/t COVID       Family History   Problem Relation Age of Onset    Alzheimer's Disease Mother     Alzheimer's Disease Father     Stroke Sister     Osteoporosis Sister     Hypertension Brother     Alzheimer's Disease Paternal Aunt     Alzheimer's Disease Paternal Uncle        Review of Systems:  Heart: as above   Lungs: as above   Eyes: denies changes in vision or discharge. Ears: denies changes in hearing or pain. Nose: denies epistaxis or masses   Throat: denies sore throat or trouble swallowing. Neuro: denies numbness, tingling, tremors. Skin: denies rashes or itching. : denies hematuria, dysuria   GI: denies vomiting, diarrhea   Psych: denies mood changed, anxiety, depression. All other systems negative. Physical Exam   /70   Pulse 90   Resp 18   Ht 5' 2\" (1.575 m)   Wt 194 lb 9.6 oz (88.3 kg)   BMI 35.59 kg/m²   Constitutional: Oriented to person, place, and time. Well-developed and well-nourished. No distress. Head: Normocephalic and atraumatic. Eyes: EOM are normal. Pupils are equal, round, and reactive to light. Neck: Normal range of motion. Neck supple. No hepatojugular reflux and no JVD present. Carotid bruit is not present. No tracheal deviation present. No thyromegaly present. Cardiovascular: Normal rate, regular rhythm, normal heart sounds and intact distal pulses. Exam reveals no gallop and no friction rub. No murmur heard. Pulmonary/Chest: Effort normal and breath sounds normal. No respiratory distress. No wheezes. No rales. No tenderness. Abdominal: Soft.  Bowel sounds are normal. No distension and no mass. No tenderness. No rebound and no guarding. Musculoskeletal: Normal range of motion. No edema and no tenderness. Lymphadenopathy:   No cervical adenopathy. No groin adenopathy. Neurological: Alert and oriented to person, place, and time. Skin: Skin is warm and dry. No rash noted. Not diaphoretic. No erythema. Psychiatric: Normal mood and affect. Behavior is normal.     EKG personally reviewed 01/20/21:  normal sinus rhythm, nonspecific ST and T waves changes, Q waves in anteroseptal.    ECHO CONCLUSIONS 12/18/2020:  - Exam indication: Systemic Sclerosis, Pulmonary Disease, SOB  - The left ventricle is normal in size. Left ventricular systolic function is normal. EF = 74 ± 5% (2D biplane) Normal left ventricular diastolic function.  - The right ventricle is normal in size. Right ventricular systolic function is normal.  _ No significant valvular abnormality identified.  - Exam was compared with the prior  echocardiographic exam performed on 4/11/2018. There is no significant change.   Electronically signed by Ilana Trammell on 12/18/2020 at 7:14:53 PM    ASSESSMENT AND PLAN:  Patient Active Problem List   Diagnosis    Vision loss, bilateral    Type 2 diabetes mellitus without complication, with long-term current use of insulin ([address]    Thyroid nodule    Solitary cyst of right breast    Sjogren's syndrome with keratoconjunctivitis sicca ([address]    Retention of urine    Restless legs    Raynaud's phenomenon without gangrene    Primary fibromyalgia syndrome    Postural orthostatic tachycardia syndrome    PAULINE on CPAP    Obesity, Class II, BMI 35-39.9    Hyperlipidemia    Other dysphagia    Lung nodule    Long term current use of insulin (HCC)    Insomnia    Intractable chronic migraine without aura and without status migrainosus    Hypothyroidism    Hypogammaglobulinemia ([address]    History of renal calculi    Gastroparesis    Gastroesophageal reflux disease    Essential hypertension    DDD (degenerative disc disease)    CVID (common variable immunodeficiency) (HCC)    Migraine    Irritable bowel syndrome    Constipation    Diarrhea    Benign intracranial hypertension    Depression    Anxiety    Anemia    Polycystic disease, ovaries    Cervicalgia    Asthma    Connective tissue disorder (HCC)    Stroke-like symptoms    TIA (transient ischemic attack)    Weakness of extremity    Subcutaneous nodule of abdominal wall    Flu vaccine need    Lower abdominal pain    Acute upper respiratory infection    Acute urinary tract infection    Optic neuritis    Immune deficiency disorder (HCC)    Wrist sprain, right, subsequent encounter    Viral pneumonia     1. YADAV/Abnormal EKG:     Multiple risk factors. Pharm stress. 2. POTS: Stable symptoms at this point. 3. DM    4. PAULINE with CPAP    5. Lipids    6. Neurological symptoms/TIA. 7. Pre-op Risk Stratification: Risks stratify by pharm stress. Brett Cobos D.O.   Cardiologist  Cardiology, [facility]

## 2021-01-24 DIAGNOSIS — I10 ESSENTIAL HYPERTENSION: ICD-10-CM

## 2021-01-25 ENCOUNTER — TELEPHONE (OUTPATIENT)
Dept: CARDIOLOGY | Age: 46
End: 2021-01-25

## 2021-01-25 RX ORDER — PROPRANOLOL HYDROCHLORIDE 10 MG/1
TABLET ORAL
Qty: 90 TABLET | Refills: 3 | Status: SHIPPED | OUTPATIENT
Start: 2021-01-25

## 2021-01-25 NOTE — TELEPHONE ENCOUNTER
Patient is scheduled for stress test 1/27, [facility] is requesting a peer to peer at [phone] opt [phone], case # [id number].  Please advise

## 2021-01-25 NOTE — TELEPHONE ENCOUNTER
Spoke with patient and confirmed Lexiscan stress test appointment on Jan.27, 2021 at 0700. Instructions for test and COVID-19 preprocedure checklist reviewed with patient.

## 2021-01-26 ENCOUNTER — OFFICE VISIT (OUTPATIENT)
Dept: PRIMARY CARE CLINIC | Age: 46
End: 2021-01-26
Payer: MEDICAID

## 2021-01-26 VITALS
OXYGEN SATURATION: 97 % | WEIGHT: 194 LBS | TEMPERATURE: 97.1 F | SYSTOLIC BLOOD PRESSURE: 118 MMHG | DIASTOLIC BLOOD PRESSURE: 72 MMHG | BODY MASS INDEX: 35.7 KG/M2 | HEART RATE: 107 BPM | HEIGHT: 62 IN

## 2021-01-26 DIAGNOSIS — Z79.4 LONG TERM CURRENT USE OF INSULIN (HCC): ICD-10-CM

## 2021-01-26 DIAGNOSIS — J45.20 MILD INTERMITTENT ASTHMA WITHOUT COMPLICATION: ICD-10-CM

## 2021-01-26 DIAGNOSIS — Z01.818 PREOP EXAMINATION: Primary | ICD-10-CM

## 2021-01-26 DIAGNOSIS — I10 ESSENTIAL HYPERTENSION: ICD-10-CM

## 2021-01-26 DIAGNOSIS — D80.1 HYPOGAMMAGLOBULINEMIA (HCC): ICD-10-CM

## 2021-01-26 PROCEDURE — G8482 FLU IMMUNIZE ORDER/ADMIN: HCPCS | Performed by: INTERNAL MEDICINE

## 2021-01-26 PROCEDURE — G8427 DOCREV CUR MEDS BY ELIG CLIN: HCPCS | Performed by: INTERNAL MEDICINE

## 2021-01-26 PROCEDURE — 1036F TOBACCO NON-USER: CPT | Performed by: INTERNAL MEDICINE

## 2021-01-26 PROCEDURE — G8417 CALC BMI ABV UP PARAM F/U: HCPCS | Performed by: INTERNAL MEDICINE

## 2021-01-26 PROCEDURE — 99214 OFFICE O/P EST MOD 30 MIN: CPT | Performed by: INTERNAL MEDICINE

## 2021-01-26 ASSESSMENT — ENCOUNTER SYMPTOMS
COLOR CHANGE: 0
EYE DISCHARGE: 0
PHOTOPHOBIA: 0
SHORTNESS OF BREATH: 0
SORE THROAT: 0
TROUBLE SWALLOWING: 0
NAUSEA: 0
ABDOMINAL PAIN: 0
RHINORRHEA: 0
VOMITING: 0
ANAL BLEEDING: 0
DIARRHEA: 0
STRIDOR: 0
EYE ITCHING: 0
EYE PAIN: 0
BLOOD IN STOOL: 0
FACIAL SWELLING: 0

## 2021-01-26 NOTE — PROGRESS NOTES
2021    Name: Chung Arredondo : 1975 Sex: female  Age: 39 y.o. Subjective:  Chief Complaint   Patient presents with    Pre-op Exam     exploriatory lap         Patient is here for medical clearance. She sees Dr. Leonard Mendoza at [facility] clinic. She is to be scheduled for an exploratory laparotomy and a right oophorectomy. This will be scheduled after she receives her other medical clearances. She has had recurrent lower abdominal pain for several months. She has some vaginal discharge without any vaginal bleeding. And ultrasound was done on 12/10/2020. This showed a hemorrhagic cyst of her right ovary, this measured about 14 x 12 x 13 millimeters. Also had a tubular structure measuring 46 mm x 23 mm x 23 mm. They felt it might be a hydrosalpinx. Left ovary was normal.  Repeat ultrasound showed the cyst of the right ovary was 35 mm x 20 mm x 18 mm which had grown in size. CT scan done on 2021 showed a 3.5 cm right adnexal unilocular cyst appeared to be intraovarian. She had a previous hysterectomy, cholecystectomy and possible appendectomy. Patient has a complicated medical history. Most recently seen at the ED at [facility] with chest pain. EKG was abnormal.  Reviewed report reviewed actual tracing. There is a possible septal infarct. Because of her chest pain and comorbidities she was referred to her cardiologist Dr. Smita Turner who is to do a pharmacological stress test on her on 2021. Depending upon his findings, cardiac clearance will be sent to [facility] clinic. She has a history of asthma and sees Dr. Michael Vizcaino at [facility]. Over the last couple of months he has had her 2 cycles of Decadron taper.  by 7 days. The last cycle is ongoing and she will be finished with it next Tuesday.   When this happened her blood sugars were extremely elevated and her immunologist at [facility] checked a hemoglobin A1c in the last week or so iwas 10.9% It was. 7% in August 2020  She is breathing much better. She will be following with Dr. Kash Quinn at [facility] for her diabetes. She is on insulin basal plus sliding scale. .  She says her blood sugars are improving. She has a history of hypertension and POTS. She is aware that the beta-blocker that she is on may blunt her response to hypoglycemia. She has history of hypothyroidism, GERD, hyperlipidemia, obstructive sleep apnea, Polycystic ovarian disease, chronic migraine,Depression, visual impairment, immune deficiency disorder with hypogammaglobulinemia. Possible connective tissue disorder and restless leg syndrome. She follows up with immunologist who asked her to defer getting a Covid vaccine until he brings her gammaglobulins up. She gets intravenous gammaglobulin from him. .  Review of Systems   Constitutional: Negative for appetite change, fatigue and unexpected weight change. HENT: Negative for congestion, ear pain, facial swelling, rhinorrhea, sore throat, tinnitus and trouble swallowing. Visual impairment   Eyes: Negative for photophobia, pain, discharge, itching and visual disturbance. Respiratory: Positive for cough and wheezing. Negative for shortness of breath and stridor. Cardiovascular: Positive for chest pain. Negative for palpitations and leg swelling. Gastrointestinal: Positive for constipation. Negative for abdominal pain, anal bleeding, blood in stool, diarrhea, nausea and vomiting. Endocrine: Negative for cold intolerance, heat intolerance, polydipsia, polyphagia and polyuria. Genitourinary: Positive for pelvic pain and vaginal discharge. Negative for difficulty urinating, dysuria, flank pain, frequency, hematuria and urgency. Musculoskeletal: Positive for arthralgias. Negative for gait problem, joint swelling and myalgias. Skin: Negative for color change, pallor and rash. Allergic/Immunologic: Negative for environmental allergies and food allergies. Neurological: Positive for headaches. Negative for dizziness, tremors, seizures, syncope, speech difficulty, weakness, light-headedness and numbness. Hematological: Negative for adenopathy. Does not bruise/bleed easily. Psychiatric/Behavioral: Negative for agitation, behavioral problems, confusion, sleep disturbance and suicidal ideas. The patient is not nervous/anxious. Current Outpatient Medications:     propranolol (INDERAL) 10 MG tablet, TAKE 1 TABLET BY MOUTH THREE TIMES DAILY, Disp: 90 tablet, Rfl: 3    Lactobacillus (BIOTINEX PO), Take by mouth, Disp: , Rfl:     ascorbic acid (VITAMIN C) 500 MG tablet, TAKE 1 TABLET BY MOUTH EVERY DAY, Disp: , Rfl:     methylPREDNISolone (MEDROL) 4 MG tablet, Take by mouth daily. 6 tab - 5 days; 5 tab - 5 days; 4 tab - 5 days; 3 tab - 5 days; 2 tab - 5 days; 1 tab - 5 days; STOP, Disp: , Rfl:     B-D ULTRAFINE III SHORT PEN 31G X 8 MM MISC, USE WITH INSULIN SHOT FOUR TIMES DAILY, Disp: , Rfl:     divalproex (DEPAKOTE ER) 500 MG extended release tablet, TAKE 1 TABLET BY MOUTH THREE TIMES DAILY, Disp: , Rfl:     diclofenac sodium (VOLTAREN) 1 % GEL, Apply 2 g topically 4 times daily, Disp: , Rfl:     zinc 50 MG CAPS, Take 50 mg by mouth daily (Patient taking differently: Take 1 capsule by mouth daily On hold for surgery), Disp: 30 capsule, Rfl: 0    polyethylene glycol (GLYCOLAX) 17 g packet, Take 17 g by mouth 2 times daily as needed for Constipation, Disp: 527 g, Rfl: 2    glucose monitoring kit (FREESTYLE) monitoring kit, 1 kit by Does not apply route daily, Disp: 1 kit, Rfl: 0    blood glucose monitor strips, Test 4 times a day & as needed for symptoms of irregular blood glucose. Dispense sufficient amount for indicated testing frequency plus additional to accommodate PRN testing needs. , Disp: 260 strip, Rfl: 1    Lancets MISC, 1 each by Does not apply route 4 times (ASTELIN) 0.1 % nasal spray, 1 spray by Nasal route 2 times daily, Disp: , Rfl:     diphenhydrAMINE (BENYLIN) 12.5 MG/5ML liquid, Take 25 mg by mouth 4 times daily as needed for Allergies (migrane break through), Disp: , Rfl:     Artificial Saliva (BIOTENE DRY MOUTH MOISTURIZING MT), Take 1 spray by mouth every 3 hours, Disp: , Rfl:     clotrimazole (LOTRIMIN) 1 % cream, Apply topically 2 times daily as needed Under breast and abdominal folds, Disp: , Rfl:     OIL OF OREGANO PO, Take 60 mg by mouth daily (with breakfast) On hold for surgery, Disp: , Rfl:     miconazole (MICOTIN) 2 % powder, Apply topically daily Apply to groin, under breast and skin folds. , Disp: , Rfl:     magnesium hydroxide (MILK OF MAGNESIA) 400 MG/5ML suspension, Take 30 mLs by mouth 4 times daily as needed for Constipation, Disp: , Rfl:     SENNA LEAVES PO, Take 470 mg by mouth three times daily , Disp: , Rfl:     promethazine (PHENERGAN) 25 MG tablet, Take 25 mg by mouth every 6 hours as needed for Nausea, Disp: , Rfl:     triamcinolone (KENALOG) 0.1 % cream, Apply topically daily as needed (rash), Disp: , Rfl:     NONFORMULARY, Take 2 capsules by mouth 2 times daily (with meals) Turmeric Ginger on hold for surgery, Disp: , Rfl:     potassium chloride (KLOR-CON) 20 MEQ packet, Take 20 mEq by mouth daily, Disp: 90 packet, Rfl: 2    levomilnacipran (FETZIMA) 40 MG CP24 extended release capsule, Take 40 mg by mouth daily, Disp: , Rfl:     Galcanezumab-gnlm (EMGALITY) 120 MG/ML SOAJ, Inject 120 mg into the skin every 30 days , Disp: , Rfl:     clonazePAM (KLONOPIN) 0.5 MG tablet, Take 0.5 mg by mouth 2 times daily. ., Disp: , Rfl:     econazole nitrate 1 % cream, Apply topically daily as needed (Apply under breasts and folds) , Disp: , Rfl:     fluticasone (FLONASE) 50 MCG/ACT nasal spray, 1 spray by Nasal route 2 times daily , Disp: , Rfl:     fluticasone-salmeterol (ADVAIR HFA) 230-21 MCG/ACT inhaler, Inhale 2 puffs into the lungs 2 times daily, Disp: , Rfl:     ipratropium-albuterol (DUONEB) 0.5-2.5 (3) MG/3ML SOLN nebulizer solution, Inhale 1 vial into the lungs every 4 hours as needed for Shortness of Breath , Disp: , Rfl:     montelukast (SINGULAIR) 10 MG tablet, Take 10 mg by mouth nightly, Disp: , Rfl:     ondansetron (ZOFRAN-ODT) 8 MG TBDP disintegrating tablet, Take 8 mg by mouth every 8 hours as needed for Nausea or Vomiting , Disp: , Rfl:     CPAP Machine MISC, by Does not apply route nightly, Disp: , Rfl:     Immune Globulin, Human, (GAMMAGARD IV), Infuse intravenously 40 gm per port once a month, Disp: , Rfl:     Magnesium 500 MG TABS, Take 1 tablet by mouth 2 times daily, Disp: , Rfl:     atorvastatin (LIPITOR) 10 MG tablet, Take 1 tablet by mouth daily, Disp: 90 tablet, Rfl: 1     Allergies   Allergen Reactions    Cymbalta [Duloxetine Hcl]      suicidal ideation     Diamox [Acetazolamide] Shortness Of Breath and Rash    Doxepin Anaphylaxis     Itchy sore throat problems with swallowing    Duloxetine Anaphylaxis     Other reaction(s): suicidal ideation    Grapeseed Extract [Nutritional Supplements] Anaphylaxis     Grapes ( red dye     Levaquin [Levofloxacin In D5w] Anaphylaxis     Foreman face lips swelling just Levaquin tolerated D5W)    Lipitor [Atorvastatin]      Causes muscle pain    Other Anaphylaxis     Strawberries hives in the mouth SOB    Pecan Nut (Diagnostic) Anaphylaxis     SOB Hives in the mouth itch    Prochlorperazine Anaphylaxis      HIGH Compazine involuntary muscle spasms low doses tolerated)    Red Dye Anaphylaxis    Reglan [Metoclopramide] Anaphylaxis     Whole body flipping around anxious involuntary muscles spasms    Rocephin [Ceftriaxone] Anaphylaxis     SOB hives turned red during gallbladder    Rosemary Oil Anaphylaxis     SOB facial swelling    Topamax [Topiramate] Anaphylaxis     Throat swelling    Tramadol Anaphylaxis     Premedicate with nausea medication ( anxiety nausea gastric discomfort emesis    Triptans Anaphylaxis     None due to Raynaud's  ( Triptans cause a stroke)    Gifford [Macadamia Nut Oil] Anaphylaxis     SOB itchey mouth hives    Abilify [Aripiprazole]      Fevers and Tremors    Aspartame And Phenylalanine Diarrhea     Blood sugar Les, Diarrhea    Cefdinir      Itchy throat    Darvon [Propoxyphene]      Darvocet itchy hives    Dilaudid [Hydromorphone Hcl]      Intolerance nausea instant    Doxycycline      Rash itching    Effexor [Venlafaxine]      Fevers chills    Gluten Meal Other (See Comments)     Rashes constipation gastric discomfort    Hydrochlorothiazide      itchy hives    Iron      ichy hive ( can tolerate ferrous gluconate)    Meclizine Swelling and Other (See Comments)     Swelling of the eyelids    Milk-Related Compounds      Dairy rashes constipation  Gastric dirrahea    Sulfa Antibiotics      SOB itchy hives    Tetracyclines & Related      Itchy  rash    Wheat Bran Itching, Nausea And Vomiting, Dermatitis and Rash    Zithromax [Azithromycin]      SOB rash itchy    Adhesive Tape Rash    Betamethasone Nausea And Vomiting        Past Medical History:   Diagnosis Date    Abnormal Pap smear of cervix     hpv age 23   [facility] Anemia     Anxiety     Asthma     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     Connective tissue disease (HCC)     Cyst of right breast     DDD (degenerative disc disease), cervical     Depression     Diabetes mellitus (HCC)     Dysphagia     Esophagus disorder     needs stretched ocassionally    Fibromyalgia     Gastroparesis     GERD (gastroesophageal reflux disease)     Headache     Hematuria     Hyperlipidemia     Hypertension     Hypothyroidism     IBS (irritable bowel syndrome)     with constipation and diarrhea    Immune deficiency disorder (HCC)     Insomnia     Kidney stones     Meningitis     x4--- twice after IVIG     PAULINE on CPAP     cpap dependence    PCOS (polycystic ovarian syndrome)     POTS (postural orthostatic tachycardia syndrome)     Problems with swallowing     due restrictive esophagus    Pseudotumor cerebri     in 2008--- treated with LP; allergy to diamox    Raynaud's disease     Rectal bleeding     RLS (restless legs syndrome)     Scleroderma (HCC)     Sjogren's disease ([address]     Thyroid nodule     TIA (transient ischemic attack)        Health Maintenance Due   Topic Date Due    Hepatitis C screen  1975    Diabetic foot exam  12/18/1987    Diabetic retinal exam  12/18/1987    HIV screen  12/18/1992    Diabetic microalbuminuria test  12/18/1995    Hepatitis B vaccine (1 of 3 - Risk 3-dose series) 12/18/1996    Cervical cancer screen  12/18/1998        Patient Active Problem List   Diagnosis    Vision loss, bilateral    Type 2 diabetes mellitus without complication, with long-term current use of insulin (HCC)    Thyroid nodule    Solitary cyst of right breast    Sjogren's syndrome with keratoconjunctivitis sicca ([address]    Retention of urine    Restless legs    Raynaud's phenomenon without gangrene    Primary fibromyalgia syndrome    Postural orthostatic tachycardia syndrome    PAULINE on CPAP    Obesity, Class II, BMI 35-39.9    Hyperlipidemia    Other dysphagia    Lung nodule    Long term current use of insulin (HCC)    Insomnia    Intractable chronic migraine without aura and without status migrainosus    Hypothyroidism    Hypogammaglobulinemia ([address]    History of renal calculi    Gastroparesis    Gastroesophageal reflux disease    Essential hypertension    DDD (degenerative disc disease)    CVID (common variable immunodeficiency) (HCC)    Migraine    Irritable bowel syndrome    Constipation    Diarrhea    Benign intracranial hypertension    Depression    Anxiety    Anemia    Polycystic disease, ovaries    Cervicalgia    Asthma    Connective tissue disorder (HCC)    Stroke-like symptoms    TIA (transient ischemic attack)    Weakness of extremity    Subcutaneous nodule of abdominal wall    Flu vaccine need    Lower abdominal pain    Acute upper respiratory infection    Acute urinary tract infection    Optic neuritis    Immune deficiency disorder (HCC)    Wrist sprain, right, subsequent encounter    Viral pneumonia    Preop examination        Past Surgical History:   Procedure Laterality Date    APPENDECTOMY      CARPAL TUNNEL RELEASE Bilateral     CHOLECYSTECTOMY      HYSTERECTOMY      PORT SURGERY      RIGHT chest  3 so far    PYLOROPLASTY          Family History   Problem Relation Age of Onset    Alzheimer's Disease Mother     Alzheimer's Disease Father     Heart Disease Father 59        MI  Stents    Stroke Father     Stroke Sister     Osteoporosis Sister     Hypertension Brother     High Cholesterol Brother     Alzheimer's Disease Paternal Aunt     Alzheimer's Disease Paternal Uncle         Social History     Tobacco Use    Smoking status: Never Smoker    Smokeless tobacco: Never Used   Substance Use Topics    Alcohol use: No     Frequency: Never    Drug use: No        Objective  Vitals:    01/26/21 1537   BP: 118/72   Pulse: 107   Temp: 97.1 °F (36.2 °C)   SpO2: 97%   Weight: 194 lb (88 kg)   Height: 5' 2\" (1.575 m)        Exam:  Physical Exam  Vitals signs reviewed. Exam conducted with a chaperone present. Constitutional:       Appearance: She is well-developed. She is obese. She is not ill-appearing. HENT:      Head: Normocephalic. Right Ear: External ear normal.      Left Ear: External ear normal.   Eyes:      General: No scleral icterus. Right eye: No discharge. Left eye: No discharge. Extraocular Movements: Extraocular movements intact. Conjunctiva/sclera: Conjunctivae normal.      Pupils: Pupils are equal, round, and reactive to light. Neck:      Musculoskeletal: Normal range of motion and neck supple. Thyroid: No thyromegaly.    Cardiovascular: Rate and Rhythm: Normal rate and regular rhythm. Heart sounds: Normal heart sounds. No murmur. No friction rub. No gallop. Pulmonary:      Effort: Pulmonary effort is normal. No respiratory distress. Breath sounds: Normal breath sounds. No wheezing or rales. Chest:      Chest wall: No tenderness. Abdominal:      General: Bowel sounds are normal. There is no distension. Palpations: Abdomen is soft. There is no mass. Tenderness: There is no abdominal tenderness. There is no guarding or rebound. Musculoskeletal: Normal range of motion. General: No tenderness or deformity. Lymphadenopathy:      Cervical: No cervical adenopathy. Skin:     General: Skin is warm and dry. Coloration: Skin is not pale. Findings: No erythema or rash. Neurological:      Mental Status: She is alert and oriented to person, place, and time. Sensory: No sensory deficit. Deep Tendon Reflexes: Reflexes normal.   Psychiatric:         Mood and Affect: Mood normal.         Behavior: Behavior normal.         Thought Content: Thought content normal.         Judgment: Judgment normal.          Last labs reviewed. ASSESSMENT & PLAN :   Problem List        Circulatory    Essential hypertension     Continue monitoring blood pressures and continue with her propranolol          Relevant Medications    furosemide (LASIX) 20 MG tablet    propranolol (INDERAL) 10 MG tablet       Respiratory    Asthma     Finish up her Decadron taper. Continue her inhalers and montelukast.  Send to her pulmonary specialist for pulmonary clearance prior to surgery. She is obese. If they end up doing a laparotomy instead of a laparoscopy then her breathing may be compromised and she may be at risk for hypostatic pneumonitis.          Relevant Medications    fluticasone-salmeterol (ADVAIR HFA) 230-21 MCG/ACT inhaler    ipratropium-albuterol (DUONEB) 0.5-2.5 (3) MG/3ML SOLN nebulizer solution    montelukast (SINGULAIR) 10 MG tablet       Other    Long term current use of insulin ([address]     Make sure she follows up with her endocrinologist prior to surgery. I am concerned about her being on prolonged doses of steroids in the recent past.  This may interfere with her healing process. We will have to monitor carefully for infection           Hypogammaglobulinemia ([address]     Continue  IVIG per her immunologist.  Will contact him to see if she will need an intravenous globulin infusion just prior to  surgery to build up her immunity         Preop examination - Primary     If medical clearance is given by both cardiology and pulmonary, she will be medically optimized for surgery. I would recommend endocrinology consult while she is in the hospital to help manage her blood sugars. Return in about 6 weeks (around 3/10/2021), or dm, hypothyrodism.        Harinder Wilson, DO  1/27/2021

## 2021-01-27 ENCOUNTER — HOSPITAL ENCOUNTER (OUTPATIENT)
Dept: CARDIOLOGY | Age: 46
Discharge: HOME | End: 2021-01-27
Payer: MEDICAID

## 2021-01-27 ENCOUNTER — TELEPHONE (OUTPATIENT)
Dept: CARDIOLOGY CLINIC | Age: 46
End: 2021-01-27

## 2021-01-27 VITALS — BODY MASS INDEX: 36.63 KG/M2 | HEIGHT: 61 IN | TEMPERATURE: 97.1 F | WEIGHT: 194 LBS

## 2021-01-27 DIAGNOSIS — E78.5 HYPERLIPIDEMIA, UNSPECIFIED HYPERLIPIDEMIA TYPE: ICD-10-CM

## 2021-01-27 DIAGNOSIS — R06.09 DOE (DYSPNEA ON EXERTION): Primary | ICD-10-CM

## 2021-01-27 DIAGNOSIS — R94.31 ABNORMAL EKG: ICD-10-CM

## 2021-01-27 DIAGNOSIS — R06.09 DOE (DYSPNEA ON EXERTION): ICD-10-CM

## 2021-01-27 PROCEDURE — 93017 CV STRESS TEST TRACING ONLY: CPT

## 2021-01-27 PROCEDURE — 2580000003 HC RX 258: Performed by: INTERNAL MEDICINE

## 2021-01-27 PROCEDURE — 3430000000 HC RX DIAGNOSTIC RADIOPHARMACEUTICAL: Performed by: INTERNAL MEDICINE

## 2021-01-27 PROCEDURE — A9500 TC99M SESTAMIBI: HCPCS | Performed by: INTERNAL MEDICINE

## 2021-01-27 PROCEDURE — 6360000002 HC RX W HCPCS: Performed by: INTERNAL MEDICINE

## 2021-01-27 PROCEDURE — 78452 HT MUSCLE IMAGE SPECT MULT: CPT

## 2021-01-27 RX ORDER — SODIUM CHLORIDE 0.9 % (FLUSH) 0.9 %
10 SYRINGE (ML) INJECTION PRN
Status: DISCONTINUED | OUTPATIENT
Start: 2021-01-27 | End: 2021-01-28 | Stop reason: HOSPADM

## 2021-01-27 RX ADMIN — Medication 10.1 MILLICURIE: at 07:24

## 2021-01-27 RX ADMIN — SODIUM CHLORIDE, PRESERVATIVE FREE 10 ML: 5 INJECTION INTRAVENOUS at 09:44

## 2021-01-27 RX ADMIN — SODIUM CHLORIDE, PRESERVATIVE FREE 10 ML: 5 INJECTION INTRAVENOUS at 07:24

## 2021-01-27 RX ADMIN — Medication 29.5 MILLICURIE: at 09:45

## 2021-01-27 RX ADMIN — REGADENOSON 0.4 MG: 0.08 INJECTION, SOLUTION INTRAVENOUS at 09:44

## 2021-01-27 RX ADMIN — SODIUM CHLORIDE, PRESERVATIVE FREE 10 ML: 5 INJECTION INTRAVENOUS at 09:45

## 2021-01-27 ASSESSMENT — ENCOUNTER SYMPTOMS
COUGH: 1
CONSTIPATION: 1
WHEEZING: 1

## 2021-01-27 NOTE — ASSESSMENT & PLAN NOTE
Continue  IVIG per her immunologist.  Will contact him to see if she will need an intravenous globulin infusion just prior to  surgery to build up her immunity

## 2021-01-27 NOTE — TELEPHONE ENCOUNTER
Patient is an acceptable risk to proceed with surgery. Tammy Hahn D.O.   Cardiologist  Cardiology, [facility]

## 2021-01-27 NOTE — ASSESSMENT & PLAN NOTE
Finish up her Decadron taper. Continue her inhalers and montelukast.  Send to her pulmonary specialist for pulmonary clearance prior to surgery. She is obese. If they end up doing a laparotomy instead of a laparoscopy then her breathing may be compromised and she may be at risk for hypostatic pneumonitis.

## 2021-01-27 NOTE — PROCEDURES
ventricular cavity size was noted to be normal.    Rotational analog analysis demonstrated no patient motion or abnormal extracardiac radioactivity and soft tissue breast attenuation. The gated SPECT stress imaging in the short, vertical long, and horizontal long axis demonstrated normal homogeneous tracer distribution throughout the myocardium. The resting images show no change. Gated SPECT left ventricular ejection fraction was calculated to be 70%, with normal myocardial thickening and wall motion. Impression:    1. ECG during the infusion did not change. 2. The myocardial perfusion imaging was normal with attenuation artifact. 3. Overall left ventricular systolic function was normal.  4. Low risk general pharmacologic stress test.    Thank you for sending your patient to this [employer].      Electronically signed by Carlene Pressley MD on 1/27/21 at 12:01 PM EST

## 2021-01-27 NOTE — ASSESSMENT & PLAN NOTE
If medical clearance is given by both cardiology and pulmonary, she will be medically optimized for surgery. I would recommend endocrinology consult while she is in the hospital to help manage her blood sugars.

## 2021-01-28 ENCOUNTER — CARE COORDINATION (OUTPATIENT)
Dept: CARE COORDINATION | Age: 46
End: 2021-01-28

## 2021-01-28 NOTE — CARE COORDINATION
ACM attempted to contact patient to follow up on her status. Her  stated she unavailable d/t she was having an MRI. ACM asked to please give the message to the patient that ACM did call and to please return the call. PLAN  If no return call, send trying to reach letter and continue to attempt to contact patient.

## 2021-02-02 DIAGNOSIS — E11.9 TYPE 2 DIABETES MELLITUS WITHOUT COMPLICATION, WITH LONG-TERM CURRENT USE OF INSULIN (HCC): ICD-10-CM

## 2021-02-02 DIAGNOSIS — Z79.4 TYPE 2 DIABETES MELLITUS WITHOUT COMPLICATION, WITH LONG-TERM CURRENT USE OF INSULIN (HCC): ICD-10-CM

## 2021-02-02 RX ORDER — LANCETS 33 GAUGE
EACH MISCELLANEOUS
Qty: 200 EACH | Refills: 0 | Status: SHIPPED
Start: 2021-02-02 | End: 2021-02-25

## 2021-02-05 ENCOUNTER — CARE COORDINATION (OUTPATIENT)
Dept: CARE COORDINATION | Age: 46
End: 2021-02-05

## 2021-02-05 NOTE — CARE COORDINATION
ACM attempted to contact patient to follow up on her status. ACM left a message with contact information asking patient to return the call. PLAN  -If no return call, ACM will inform PCP that [facility] plans to dis-enroll patient from Care Coordination d/t unable to reach.

## 2021-02-07 DIAGNOSIS — E03.9 ACQUIRED HYPOTHYROIDISM: ICD-10-CM

## 2021-02-08 RX ORDER — LEVOTHYROXINE SODIUM 0.05 MG/1
125 TABLET ORAL
Qty: 90 TABLET | Refills: 1 | Status: SHIPPED | OUTPATIENT
Start: 2021-02-08

## 2021-02-08 NOTE — TELEPHONE ENCOUNTER
Last Appointment:  1/26/2021  Future Appointments   Date Time Provider Alisa Pike   2/23/2021  3:20 PM REYNOLD Avery NP BD Neuro [facility]   3/10/2021  8:45 AM Royanne Dominion, DO Claudeen Grip [facility]

## 2021-02-25 ENCOUNTER — TELEMEDICINE (OUTPATIENT)
Dept: NEUROLOGY | Age: 46
End: 2021-02-25
Payer: MEDICAID

## 2021-02-25 DIAGNOSIS — G43.411 INTRACTABLE HEMIPLEGIC MIGRAINE WITH STATUS MIGRAINOSUS: Primary | ICD-10-CM

## 2021-02-25 DIAGNOSIS — R29.90 STROKE-LIKE SYMPTOMS: ICD-10-CM

## 2021-02-25 PROBLEM — Z01.818 PREOP EXAMINATION: Status: RESOLVED | Noted: 2021-01-26 | Resolved: 2021-02-25

## 2021-02-25 PROCEDURE — 99441 PR PHYS/QHP TELEPHONE EVALUATION 5-10 MIN: CPT | Performed by: NURSE PRACTITIONER

## 2021-02-25 RX ORDER — FAMOTIDINE 20 MG/1
1 TABLET, FILM COATED ORAL NIGHTLY
COMMUNITY
Start: 2021-02-02 | End: 2021-05-13

## 2021-02-25 RX ORDER — VERAPAMIL HYDROCHLORIDE 40 MG/1
1 TABLET ORAL 2 TIMES DAILY
COMMUNITY
Start: 2021-02-22 | End: 2021-05-13

## 2021-02-25 RX ORDER — GALCANEZUMAB 120 MG/ML
120 INJECTION, SOLUTION SUBCUTANEOUS
Qty: 1 PEN | Refills: 3 | Status: SHIPPED | OUTPATIENT
Start: 2021-02-25 | End: 2021-03-27

## 2021-02-25 NOTE — PROGRESS NOTES
Zenon Flynn was read the following message We want to confirm that, for purposes of billing, this is a virtual visit with your provider for which we will submit a claim for reimbursement with your insurance company. You will be responsible for any copays, coinsurance amounts or other amounts not covered by your insurance company. If you do not accept this, unfortunately we will not be able to schedule or proceed with a virtual visit with the provider. Do you accept? Nilesh Pace responded Yes .

## 2021-02-25 NOTE — PROGRESS NOTES
needed for Constipation 12/28/20  Yes Vicenta Wallace DO   tiZANidine (ZANAFLEX) 2 MG tablet Take 1 tablet by mouth 2 times daily as needed (muscle) 12/7/20 3/7/21 Yes Vicenta Wallace DO   Polyvinyl Alcohol-Povidone (REFRESH OP) Apply to eye Artificial tears   Yes Historical Provider, MD   insulin glargine (LANTUS SOLOSTAR) 100 UNIT/ML injection pen Injects 8 units in the morning and 14 units at night 11/10/20  Yes Historical Provider, MD   ketorolac (TORADOL) 10 MG tablet Take 1 tablet by mouth every 6 hours as needed for Pain 10/19/20  Yes Alicia Gardner,    Magnesium 500 MG TABS Take 1 tablet by mouth 2 times daily   Yes Historical Provider, MD   insulin lispro, 1 Unit Dial, (HUMALOG KWIKPEN) 100 UNIT/ML SOPN Inject into the skin daily Sliding scale: 150-200- 4 units, then add 2 units for every 50 up to a max of 12 units.    Yes Historical Provider, MD   Probiotic Product (PROBIOTIC DAILY PO) daily    Yes Historical Provider, MD   furosemide (LASIX) 20 MG tablet Take 1 tablet by mouth daily 10/5/20  Yes Luann Single, DO   aspirin 81 MG EC tablet Take 81 mg by mouth daily   Yes Historical Provider, MD   rOPINIRole (REQUIP) 0.5 MG tablet Take 1 tablet by mouth See Admin Instructions 1 tablet - breakfast  2 tablets - supper  Patient taking differently: Take 0.5 mg by mouth See Admin Instructions 1 tablet qAM and 2 tablets qPM 9/1/20 2/25/21 Yes Vicenta Wallace DO   pantoprazole (PROTONIX) 40 MG tablet Take 1 tablet by mouth every morning (before breakfast) 9/1/20  Yes Vicenta Wallace DO   folic acid (FOLVITE) 1 MG tablet Take 1 tablet by mouth Daily with lunch 9/1/20  Yes Luann Single, DO   acetaminophen (TYLENOL) 325 MG tablet Take 650 mg by mouth every 6 hours as needed for Pain   Yes Historical Provider, MD   sodium chloride (OCEAN, BABY AYR) 0.65 % nasal spray 1 spray by Nasal route 4 times daily as needed for Congestion   Yes Historical Provider, MD   azelastine (ASTELIN) 0.1 % nasal spray 1 spray by Nasal route 2 times daily   Yes Historical Provider, MD   diphenhydrAMINE (BENYLIN) 12.5 MG/5ML liquid Take 25 mg by mouth 4 times daily as needed for Allergies (migrane break through)   Yes Historical Provider, MD   Artificial Saliva (BIOTENE DRY MOUTH MOISTURIZING MT) Take 1 spray by mouth every 3 hours   Yes Historical Provider, MD   OIL OF OREGANO PO Take 60 mg by mouth daily (with breakfast)    Yes Historical Provider, MD   miconazole (MICOTIN) 2 % powder Apply topically daily Apply to groin, under breast and skin folds. Yes Historical Provider, MD   magnesium hydroxide (MILK OF MAGNESIA) 400 MG/5ML suspension Take 30 mLs by mouth 4 times daily as needed for Constipation   Yes Historical Provider, MD   SENNA LEAVES PO Take 470 mg by mouth three times daily    Yes Historical Provider, MD   promethazine (PHENERGAN) 25 MG tablet Take 25 mg by mouth every 6 hours as needed for Nausea   Yes Historical Provider, MD   triamcinolone (KENALOG) 0.1 % cream Apply topically daily as needed (rash)   Yes Historical Provider, MD   NONFORMULARY Take 2 capsules by mouth 2 times daily (with meals) Turmeric Ginger   Yes Historical Provider, MD   potassium chloride (KLOR-CON) 20 MEQ packet Take 20 mEq by mouth daily 8/19/20 2/25/21 Yes Vicenta Wallace,    levomilnacipran (FETZIMA) 40 MG CP24 extended release capsule Take 40 mg by mouth daily   Yes Historical Provider, MD   clonazePAM (KLONOPIN) 0.5 MG tablet Take 0.5 mg by mouth 2 times daily. .   Yes Historical Provider, MD   econazole nitrate 1 % cream Apply topically daily as needed (Apply under breasts and folds)    Yes Historical Provider, MD   fluticasone (FLONASE) 50 MCG/ACT nasal spray 1 spray by Nasal route 2 times daily    Yes Historical Provider, MD   fluticasone-salmeterol (ADVAIR HFA) 230-21 MCG/ACT inhaler Inhale 2 puffs into the lungs 2 times daily   Yes Historical Provider, MD   ipratropium-albuterol (DUONEB) 0.5-2.5 (3) MG/3ML SOLN nebulizer solution Inhale 1 vial into the lungs every 4 hours as needed for Shortness of Breath    Yes Historical Provider, MD   montelukast (SINGULAIR) 10 MG tablet Take 10 mg by mouth nightly   Yes Historical Provider, MD   ondansetron (ZOFRAN-ODT) 8 MG TBDP disintegrating tablet Take 8 mg by mouth every 8 hours as needed for Nausea or Vomiting    Yes Historical Provider, MD       Objective:     Mental Status Exam: sounds alert, oriented x4; thought processes appropriate    Speech sounds clear    Breathing Effort sounds normal    Laboratory/Radiology:     CBC:   Lab Results   Component Value Date    WBC 8.7 01/22/2021    RBC 4.61 01/22/2021    RBC 4.31 03/11/2020    HGB 14.0 01/22/2021    HCT 42.7 01/22/2021    MCV 92.8 01/22/2021    MCH 30.4 01/22/2021    MCHC 32.7 01/22/2021    RDW 14.2 01/22/2021     01/22/2021    MPV 9.7 01/22/2021     CMP:    Lab Results   Component Value Date     01/22/2021    K 4.3 01/22/2021    K 4.2 01/02/2021    CL 97 01/22/2021    CO2 25 01/22/2021    BUN 21 01/22/2021    CREATININE 0.7 01/22/2021    GFRAA >60 01/02/2021    AGRATIO 1.0 01/22/2021    LABGLOM 91 01/22/2021    GLUCOSE 377 01/22/2021    PROT 6.5 01/22/2021    LABALBU 3.3 01/22/2021    CALCIUM 8.7 01/22/2021    BILITOT 1.0 01/22/2021    ALKPHOS 49 01/22/2021    AST 33 01/22/2021    ALT 26 01/22/2021     U/A:    Lab Results   Component Value Date    NITRITE NEG 09/24/2020    COLORU Yellow 10/18/2020    PROTEINU Negative 10/18/2020    PHUR 7.0 10/18/2020    WBCUA 0-1 10/18/2020    RBCUA NONE 10/18/2020    BACTERIA RARE 10/18/2020    CLARITYU Clear 10/18/2020    SPECGRAV 1.010 10/18/2020    LEUKOCYTESUR TRACE 10/18/2020    UROBILINOGEN 0.2 10/18/2020    BILIRUBINUR Negative 10/18/2020    BILIRUBINUR NEG 09/24/2020    BLOODU Negative 10/18/2020    GLUCOSEU Negative 10/18/2020     HgBA1c:    Lab Results   Component Value Date    LABA1C 7.0 08/28/2020     FLP:    Lab Results   Component Value Date    TRIG 116 08/28/2020    HDL 43 08/28/2020 [address] 64 08/28/2020    LABVLDL 23 08/28/2020     TSH:    Lab Results   Component Value Date    TSH 1.430 09/19/2020     CT head without contrast 12/30/2020: No acute intracranial abnormality    CT head without contrast 12/3/2020: No acute intracranial abnormality    CT head 9/19/2020: No acute intracranial process.     MRI brain with contrast 9/16/2020: MRI of the brain IV contrast only, demonstrates no disruption of blood/brain barrier or abnormal intracranial enhancements.       MRI cervical spine with contrast 9/16/2020: Unremarkable MRA of the cervical spine IV contrast only. No indication of disruption of blood dilatation brain barrier in the cervical spine cord, upper thoracic spine cord, or abnormal enhancement in the cord substance.     MRI brain without contrast 8/27/2020:   No acute findings. Essentially unremarkable study.     CTA head and neck with contrast 8/27/2020:   1. No significant arterial inflow disease in the neck. 2.  Intracranially, no evidence of large vessel occlusion, aneurysm or vascular malformation.   Suspected mild hypoplasia of the right P1 segment.     MRI cervical spine without contrast 8/9/2020:   Single level disc disease at C5-6 intervally progressed from 2006.     CT head without contrast 3/11/2020: No acute brain process identified.     All labs and images personally reviewed today    Assessment:     Complex migraine with hemiplegia versus TIA during hospital admission in August 2020              Right facial droop, disorientation, double vision and expressive aphasia reported--- NIHSS 6                          Resolved spontaneously after arrival to ED; no tPA given              MRI negative for acute pathology              CTA head and neck was also unremarkable for LVO or vascular malformation              Patient started on aspirin; not started on statin              A1c 7.0, LDL 64              Currently on Zanaflex, magnesium, verapamil, propranolol, Depakote and Emgality                          Does not need additional therapy at this time--- med list should be simplified if able              Has failed multiple other therapies in the past---please see above              Patient has failed to keep a headache diary              Patient asked about breakthrough headache medication last visit----I will not add additional therapies at this time this was reiterated. Patient's clinical presentation is most likely being complicated by her multiple medications. Last hemiplegic migraine on February 8; after falling a few days prior   Patient has had about 3 migraines since her last visit.   They are not as frequent or severe since starting Emgality.     Reported history of pseudotumor cerebri, asymptomatic meningitis, immune deficiency disorder, and dysphasia              Also reportedly being worked up for MS--- MRIs without contrast unremarkable              MRI of brain and cervical spine with contrast were unremarkable for acute process              Blind bilaterally worse to left eye--- can see shadow in right eye              Recent LP positive for oligoclonal banding; NMO <1.5, WBC 4 but elevated protein and glucose              Patient is on IVIG once monthly and on Gammagard through her immunologist at [facility]              Patient will continue to follow with her neuro-ophthalmologist     Plan:     Continue Emgality and magnesium   Emgality refilled today  Continue low-dose statin and aspirin for stroke prevention  Call with any issues  Return office in 3 months      REYNOLD Ruiz  1:10 PM  2/25/2021

## 2021-02-26 ENCOUNTER — TELEPHONE (OUTPATIENT)
Dept: NEUROLOGY | Age: 46
End: 2021-02-26

## 2021-05-13 ENCOUNTER — HOSPITAL ENCOUNTER (EMERGENCY)
Age: 46
Discharge: HOME | End: 2021-05-13
Attending: FAMILY MEDICINE
Payer: MEDICAID

## 2021-05-13 VITALS
DIASTOLIC BLOOD PRESSURE: 90 MMHG | HEIGHT: 62 IN | SYSTOLIC BLOOD PRESSURE: 157 MMHG | BODY MASS INDEX: 38.64 KG/M2 | HEART RATE: 94 BPM | RESPIRATION RATE: 15 BRPM | TEMPERATURE: 96.8 F | WEIGHT: 210 LBS | OXYGEN SATURATION: 97 %

## 2021-05-13 DIAGNOSIS — R10.12 ABDOMINAL PAIN, LEFT UPPER QUADRANT: Primary | ICD-10-CM

## 2021-05-13 LAB
ALBUMIN SERPL-MCNC: 4.1 G/DL (ref 3.5–5.2)
ALP BLD-CCNC: 58 U/L (ref 35–104)
ALT SERPL-CCNC: 30 U/L (ref 0–32)
AMYLASE: 38 U/L (ref 20–100)
ANION GAP SERPL CALCULATED.3IONS-SCNC: 8 MMOL/L (ref 7–16)
AST SERPL-CCNC: 17 U/L (ref 0–31)
BASOPHILS ABSOLUTE: 0.01 E9/L (ref 0–0.2)
BASOPHILS RELATIVE PERCENT: 0.1 % (ref 0–2)
BILIRUB SERPL-MCNC: 0.3 MG/DL (ref 0–1.2)
BILIRUBIN URINE: NEGATIVE
BLOOD, URINE: NEGATIVE
BUN BLDV-MCNC: 24 MG/DL (ref 6–20)
CALCIUM SERPL-MCNC: 8.9 MG/DL (ref 8.6–10.2)
CHLORIDE BLD-SCNC: 99 MMOL/L (ref 98–107)
CLARITY: CLEAR
CO2: 29 MMOL/L (ref 22–29)
COLOR: YELLOW
CREAT SERPL-MCNC: 0.8 MG/DL (ref 0.5–1)
EOSINOPHILS ABSOLUTE: 0 E9/L (ref 0.05–0.5)
EOSINOPHILS RELATIVE PERCENT: 0 % (ref 0–6)
GFR AFRICAN AMERICAN: >60
GFR NON-AFRICAN AMERICAN: >60 ML/MIN/1.73
GLUCOSE BLD-MCNC: 303 MG/DL (ref 74–99)
GLUCOSE URINE: 500 MG/DL
HCT VFR BLD CALC: 45.2 % (ref 34–48)
HEMOGLOBIN: 15.1 G/DL (ref 11.5–15.5)
IMMATURE GRANULOCYTES #: 0.27 E9/L
IMMATURE GRANULOCYTES %: 2.9 % (ref 0–5)
KETONES, URINE: 15 MG/DL
LACTIC ACID: 1.5 MMOL/L (ref 0.5–2.2)
LEUKOCYTE ESTERASE, URINE: NEGATIVE
LIPASE: 52 U/L (ref 13–60)
LYMPHOCYTES ABSOLUTE: 1.16 E9/L (ref 1.5–4)
LYMPHOCYTES RELATIVE PERCENT: 12.6 % (ref 20–42)
MCH RBC QN AUTO: 29.7 PG (ref 26–35)
MCHC RBC AUTO-ENTMCNC: 33.4 % (ref 32–34.5)
MCV RBC AUTO: 89 FL (ref 80–99.9)
MONOCYTES ABSOLUTE: 0.41 E9/L (ref 0.1–0.95)
MONOCYTES RELATIVE PERCENT: 4.5 % (ref 2–12)
NEUTROPHILS ABSOLUTE: 7.33 E9/L (ref 1.8–7.3)
NEUTROPHILS RELATIVE PERCENT: 79.9 % (ref 43–80)
NITRITE, URINE: NEGATIVE
PDW BLD-RTO: 12.4 FL (ref 11.5–15)
PH UA: 8 (ref 5–9)
PLATELET # BLD: 243 E9/L (ref 130–450)
PMV BLD AUTO: 10.7 FL (ref 7–12)
POTASSIUM REFLEX MAGNESIUM: 4.6 MMOL/L (ref 3.5–5)
PROTEIN UA: NEGATIVE MG/DL
RBC # BLD: 5.08 E12/L (ref 3.5–5.5)
SODIUM BLD-SCNC: 136 MMOL/L (ref 132–146)
SPECIFIC GRAVITY UA: 1.01 (ref 1–1.03)
TOTAL PROTEIN: 6.8 G/DL (ref 6.4–8.3)
UROBILINOGEN, URINE: 0.2 E.U./DL
WBC # BLD: 9.2 E9/L (ref 4.5–11.5)

## 2021-05-13 PROCEDURE — 81003 URINALYSIS AUTO W/O SCOPE: CPT

## 2021-05-13 PROCEDURE — 6360000002 HC RX W HCPCS: Performed by: FAMILY MEDICINE

## 2021-05-13 PROCEDURE — 2580000003 HC RX 258: Performed by: FAMILY MEDICINE

## 2021-05-13 PROCEDURE — 96374 THER/PROPH/DIAG INJ IV PUSH: CPT

## 2021-05-13 PROCEDURE — 80053 COMPREHEN METABOLIC PANEL: CPT

## 2021-05-13 PROCEDURE — 82150 ASSAY OF AMYLASE: CPT

## 2021-05-13 PROCEDURE — 36415 COLL VENOUS BLD VENIPUNCTURE: CPT

## 2021-05-13 PROCEDURE — 96375 TX/PRO/DX INJ NEW DRUG ADDON: CPT

## 2021-05-13 PROCEDURE — 85025 COMPLETE CBC W/AUTO DIFF WBC: CPT

## 2021-05-13 PROCEDURE — 99284 EMERGENCY DEPT VISIT MOD MDM: CPT

## 2021-05-13 PROCEDURE — 83690 ASSAY OF LIPASE: CPT

## 2021-05-13 PROCEDURE — 83605 ASSAY OF LACTIC ACID: CPT

## 2021-05-13 RX ORDER — CIMETIDINE 300 MG/1
300 TABLET, FILM COATED ORAL 2 TIMES DAILY
COMMUNITY

## 2021-05-13 RX ORDER — MORPHINE SULFATE 10 MG/ML
10 INJECTION, SOLUTION INTRAMUSCULAR; INTRAVENOUS ONCE
Status: COMPLETED | OUTPATIENT
Start: 2021-05-13 | End: 2021-05-13

## 2021-05-13 RX ORDER — ONDANSETRON 2 MG/ML
4 INJECTION INTRAMUSCULAR; INTRAVENOUS ONCE
Status: COMPLETED | OUTPATIENT
Start: 2021-05-13 | End: 2021-05-13

## 2021-05-13 RX ORDER — KETOROLAC TROMETHAMINE 30 MG/ML
30 INJECTION, SOLUTION INTRAMUSCULAR; INTRAVENOUS ONCE
Status: COMPLETED | OUTPATIENT
Start: 2021-05-13 | End: 2021-05-13

## 2021-05-13 RX ORDER — METHYLPREDNISOLONE 4 MG/1
2 TABLET ORAL DAILY
Status: ON HOLD | COMMUNITY
End: 2021-07-28

## 2021-05-13 RX ORDER — TRAMADOL HYDROCHLORIDE 50 MG/1
50 TABLET ORAL EVERY 6 HOURS PRN
Qty: 12 TABLET | Refills: 0 | Status: SHIPPED | OUTPATIENT
Start: 2021-05-13 | End: 2021-05-16

## 2021-05-13 RX ORDER — 0.9 % SODIUM CHLORIDE 0.9 %
1000 INTRAVENOUS SOLUTION INTRAVENOUS ONCE
Status: COMPLETED | OUTPATIENT
Start: 2021-05-13 | End: 2021-05-13

## 2021-05-13 RX ADMIN — KETOROLAC TROMETHAMINE 30 MG: 30 INJECTION, SOLUTION INTRAMUSCULAR; INTRAVENOUS at 20:24

## 2021-05-13 RX ADMIN — ONDANSETRON 4 MG: 2 INJECTION INTRAMUSCULAR; INTRAVENOUS at 20:23

## 2021-05-13 RX ADMIN — MORPHINE SULFATE 10 MG: 10 INJECTION INTRAVENOUS at 22:16

## 2021-05-13 RX ADMIN — SODIUM CHLORIDE 1000 ML: 9 INJECTION, SOLUTION INTRAVENOUS at 20:23

## 2021-05-13 ASSESSMENT — PAIN DESCRIPTION - PAIN TYPE: TYPE: ACUTE PAIN

## 2021-05-13 ASSESSMENT — PAIN SCALES - GENERAL
PAINLEVEL_OUTOF10: 6
PAINLEVEL_OUTOF10: 6

## 2021-05-13 ASSESSMENT — PAIN DESCRIPTION - LOCATION: LOCATION: ABDOMEN

## 2021-05-13 ASSESSMENT — PAIN DESCRIPTION - DESCRIPTORS: DESCRIPTORS: CONSTANT;THROBBING;SHARP

## 2021-05-13 ASSESSMENT — PAIN DESCRIPTION - PROGRESSION: CLINICAL_PROGRESSION: GRADUALLY WORSENING

## 2021-05-13 NOTE — ED PROVIDER NOTES
HPI:  5/13/21,   Time: 7:22 PM EDT         Eusebio Crawford is a 39 y.o. female presenting to the ED for a 2-week history of left upper abdominal pain that has been fairly constant and may be slightly worse today. She denies  diarrhea or any change in her bowel habits. She did consult with a gastroenterologist 2 days ago and a work-up included imaging that shows that she had an enlarged spleen. For this reason, as far she understands, she was referred to an oncologist which she has not seen yet. Today she is having episodes of vomiting and can only tolerate taking sips of water. ROS:   Pertinent positives and negatives are stated within HPI, all other systems reviewed and are negative.  --------------------------------------------- PAST HISTORY ---------------------------------------------  Past Medical History:  has a past medical history of Abnormal Pap smear of cervix, Anemia, Anxiety, Asthma, Blind, Connective tissue disease ([address] Cyst of right breast, DDD (degenerative disc disease), cervical, Depression, Diabetes mellitus ([address] Dysphagia, Esophagus disorder, Fibromyalgia, Gastroparesis, GERD (gastroesophageal reflux disease), Headache, Hematuria, Hyperlipidemia, Hypertension, Hypothyroidism, IBS (irritable bowel syndrome), Immune deficiency disorder ([address] Insomnia, Kidney stones, Meningitis, PAULINE on CPAP, PCOS (polycystic ovarian syndrome), POTS (postural orthostatic tachycardia syndrome), Problems with swallowing, Pseudotumor cerebri, Raynaud's disease, Rectal bleeding, RLS (restless legs syndrome), Scleroderma ([address] Sjogren's disease ([address] Thyroid nodule, and TIA (transient ischemic attack). Past Surgical History:  has a past surgical history that includes Hysterectomy; Cholecystectomy; Appendectomy; Carpal tunnel release (Bilateral); Port Surgery; and Pyloroplasty. Social History:  reports that she has never smoked.  She has never used smokeless tobacco. She reports that she does not drink alcohol and does not use drugs. Family History: family history includes Alzheimer's Disease in her father, mother, paternal aunt, and paternal uncle; Heart Disease (age of onset: 59) in her father; High Cholesterol in her brother; Hypertension in her brother; Osteoporosis in her sister; Stroke in her father and sister. The patients home medications have been reviewed.     Allergies: Cymbalta [duloxetine hcl], Diamox [acetazolamide], Doxepin, Duloxetine, Grapeseed extract [nutritional supplements], Levaquin [levofloxacin in d5w], Lipitor [atorvastatin], Other, Pecan nut (diagnostic), Prochlorperazine, Red dye, Reglan [metoclopramide], Rocephin [ceftriaxone], Rosemary oil, Topamax [topiramate], Tramadol, Triptans, [place] [macadamia nut oil], Abilify [aripiprazole], Aspartame and phenylalanine, Cefdinir, Darvon [propoxyphene], Dilaudid [hydromorphone hcl], Doxycycline, Effexor [venlafaxine], Gluten meal, Hydrochlorothiazide, Iron, Meclizine, Milk-related compounds, Sulfa antibiotics, Tetracyclines & related, Wheat bran, Zithromax [azithromycin], Adhesive tape, and Betamethasone    -------------------------------------------------- RESULTS -------------------------------------------------  All laboratory and radiology results have been personally reviewed by myself   LABS:  Results for orders placed or performed during the hospital encounter of 05/13/21   CBC Auto Differential   Result Value Ref Range    WBC 9.2 4.5 - 11.5 E9/L    RBC 5.08 3.50 - 5.50 E12/L    Hemoglobin 15.1 11.5 - 15.5 g/dL    Hematocrit 45.2 34.0 - 48.0 %    MCV 89.0 80.0 - 99.9 fL    MCH 29.7 26.0 - 35.0 pg    MCHC 33.4 32.0 - 34.5 %    RDW 12.4 11.5 - 15.0 fL    Platelets 636 073 - 463 E9/L    MPV 10.7 7.0 - 12.0 fL    Neutrophils % 79.9 43.0 - 80.0 %    Immature Granulocytes % 2.9 0.0 - 5.0 %    Lymphocytes % 12.6 (L) 20.0 - 42.0 %    Monocytes % 4.5 2.0 - 12.0 %    Eosinophils % 0.0 0.0 - 6.0 %    Basophils % 0.1 0.0 - 2.0 %    Neutrophils Absolute 7.33 (H) 1.80 - 7.30 E9/L    Immature Granulocytes # 0.27 E9/L    Lymphocytes Absolute 1.16 (L) 1.50 - 4.00 E9/L    Monocytes Absolute 0.41 0.10 - 0.95 E9/L    Eosinophils Absolute 0.00 (L) 0.05 - 0.50 E9/L    Basophils Absolute 0.01 0.00 - 0.20 E9/L   Comprehensive Metabolic Panel w/ Reflex to MG   Result Value Ref Range    Sodium 136 132 - 146 mmol/L    Potassium reflex Magnesium 4.6 3.5 - 5.0 mmol/L    Chloride 99 98 - 107 mmol/L    CO2 29 22 - 29 mmol/L    Anion Gap 8 7 - 16 mmol/L    Glucose 303 (H) 74 - 99 mg/dL    BUN 24 (H) 6 - 20 mg/dL    CREATININE 0.8 0.5 - 1.0 mg/dL    GFR Non-African American >60 >=60 mL/min/1.73    GFR African American >60     Calcium 8.9 8.6 - 10.2 mg/dL    Total Protein 6.8 6.4 - 8.3 g/dL    Albumin 4.1 3.5 - 5.2 g/dL    Total Bilirubin 0.3 0.0 - 1.2 mg/dL    Alkaline Phosphatase 58 35 - 104 U/L    ALT 30 0 - 32 U/L    AST 17 0 - 31 U/L   Lipase   Result Value Ref Range    Lipase 52 13 - 60 U/L   Amylase   Result Value Ref Range    Amylase 38 20 - 100 U/L   Lactic Acid, Plasma   Result Value Ref Range    Lactic Acid 1.5 0.5 - 2.2 mmol/L   Urinalysis, reflex to microscopic   Result Value Ref Range    Color, UA Yellow Straw/Yellow    Clarity, UA Clear Clear    Glucose, Ur 500 (A) Negative mg/dL    Bilirubin Urine Negative Negative    Ketones, Urine 15 (A) Negative mg/dL    Specific Gravity, UA 1.015 1.005 - 1.030    Blood, Urine Negative Negative    pH, UA 8.0 5.0 - 9.0    Protein, UA Negative Negative mg/dL    Urobilinogen, Urine 0.2 <2.0 E.U./dL    Nitrite, Urine Negative Negative    Leukocyte Esterase, Urine Negative Negative       RADIOLOGY:  Interpreted by Radiologist.  No orders to display       ------------------------- NURSING NOTES AND VITALS REVIEWED ---------------------------   The nursing notes within the ED encounter and vital signs as below have been reviewed.    BP (!) 157/90   Pulse 94   Temp 96.8 °F (36 °C) (Infrared)   Resp 15   Ht 5' 2\" (1.575 m)   Wt 210 lb (95.3 kg)   SpO2 97%   BMI 38.41 kg/m²   Oxygen Saturation Interpretation: Normal      ---------------------------------------------------PHYSICAL EXAM--------------------------------------    Constitutional/General: Alert and oriented x3, well appearing, non toxic in NAD  Head: NC/AT  Eyes: PERRL, EOMI  Mouth: Oropharynx clear, handling secretions, no trismus  Neck: Supple, full ROM, no meningeal signs  Pulmonary: Lungs clear to auscultation bilaterally, no wheezes, rales, or rhonchi. Not in respiratory distress  Cardiovascular:  Regular rate and rhythm, no murmurs, gallops, or rubs. 2+ distal pulses  Abdomen: Soft, left upper quadrant tenderness palpation of moderate intensity. Bowel sounds are normoactive in all 4 quadrants. No hernias or masses organomegaly were palpated. Extremities: Moves all extremities x 4. Warm and well perfused  Skin: warm and dry without rash  Neurologic: GCS 15,  Psych: Normal Affect      ------------------------------ ED COURSE/MEDICAL DECISION MAKING----------------------  Medications   0.9 % sodium chloride bolus (0 mLs Intravenous Stopped 5/13/21 2212)   ondansetron (ZOFRAN) injection 4 mg (4 mg Intravenous Given 5/13/21 2023)   ketorolac (TORADOL) injection 30 mg (30 mg Intravenous Given 5/13/21 2024)   morphine (PF) injection 10 mg (10 mg Intravenous Given 5/13/21 2216)         Medical Decision Making:    Patient's lab testing is normal except for elevated blood sugar which is probably elevated because she is on a Medrol Dosepak for bronchitis. She she had some brief relief of pain from Toradol. I will give her a dose of morphine and prescribe her tramadol at home. It is listed as one of her allergies, but she states that she does tolerate it without secondary effects. She was agreeable to this plan she was discharged home in stable condition.   She has a chronic pain issue without clear etiology but she does have follow-up with specialty and she was agreeable to this plan. Counseling: The emergency provider has spoken with the patient and discussed todays results, in addition to providing specific details for the plan of care and counseling regarding the diagnosis and prognosis. Questions are answered at this time and they are agreeable with the plan.      --------------------------------- IMPRESSION AND DISPOSITION ---------------------------------    IMPRESSION  1.  Abdominal pain, left upper quadrant        DISPOSITION  Disposition: Discharge to home  Patient condition is stable                 Garcia Lopes MD  05/23/21 [id number]

## 2021-05-21 ENCOUNTER — OFFICE VISIT (OUTPATIENT)
Dept: CARDIOLOGY CLINIC | Age: 46
End: 2021-05-21
Payer: MEDICAID

## 2021-05-21 VITALS
DIASTOLIC BLOOD PRESSURE: 90 MMHG | HEART RATE: 91 BPM | HEIGHT: 62 IN | RESPIRATION RATE: 16 BRPM | WEIGHT: 205.6 LBS | BODY MASS INDEX: 37.84 KG/M2 | SYSTOLIC BLOOD PRESSURE: 136 MMHG

## 2021-05-21 DIAGNOSIS — E78.5 HYPERLIPIDEMIA, UNSPECIFIED HYPERLIPIDEMIA TYPE: Primary | ICD-10-CM

## 2021-05-21 PROCEDURE — 93000 ELECTROCARDIOGRAM COMPLETE: CPT | Performed by: INTERNAL MEDICINE

## 2021-05-21 PROCEDURE — 99214 OFFICE O/P EST MOD 30 MIN: CPT | Performed by: INTERNAL MEDICINE

## 2021-05-21 PROCEDURE — 1036F TOBACCO NON-USER: CPT | Performed by: INTERNAL MEDICINE

## 2021-05-21 PROCEDURE — G8417 CALC BMI ABV UP PARAM F/U: HCPCS | Performed by: INTERNAL MEDICINE

## 2021-05-21 PROCEDURE — G8428 CUR MEDS NOT DOCUMENT: HCPCS | Performed by: INTERNAL MEDICINE

## 2021-05-21 NOTE — PROGRESS NOTES
tablet TAKE 1 TABLET BY MOUTH THREE TIMES DAILY 90 tablet 3    ascorbic acid (VITAMIN C) 500 MG tablet TAKE 1 TABLET BY MOUTH EVERY DAY      divalproex (DEPAKOTE ER) 500 MG extended release tablet TAKE 1 TABLET BY MOUTH THREE TIMES DAILY      zinc 50 MG CAPS Take 50 mg by mouth daily 30 capsule 0    polyethylene glycol (GLYCOLAX) 17 g packet Take 17 g by mouth 2 times daily as needed for Constipation 527 g 2    Polyvinyl Alcohol-Povidone (REFRESH OP) Apply to eye Artificial tears      insulin glargine (LANTUS SOLOSTAR) 100 UNIT/ML injection pen Injects 8 units in the morning and 14 units at night      ketorolac (TORADOL) 10 MG tablet Take 1 tablet by mouth every 6 hours as needed for Pain 20 tablet 0    Magnesium 500 MG TABS Take 1 tablet by mouth 2 times daily      insulin lispro, 1 Unit Dial, (HUMALOG KWIKPEN) 100 UNIT/ML SOPN Inject into the skin daily Sliding scale: 150-200- 4 units, then add 2 units for every 50 up to a max of 12 units.       Probiotic Product (PROBIOTIC DAILY PO) daily       furosemide (LASIX) 20 MG tablet Take 1 tablet by mouth daily 60 tablet 3    aspirin 81 MG EC tablet Take 81 mg by mouth daily      pantoprazole (PROTONIX) 40 MG tablet Take 1 tablet by mouth every morning (before breakfast) (Patient taking differently: Take 40 mg by mouth 2 times daily ) 90 tablet 1    folic acid (FOLVITE) 1 MG tablet Take 1 tablet by mouth Daily with lunch 90 tablet 1    acetaminophen (TYLENOL) 325 MG tablet Take 650 mg by mouth every 6 hours as needed for Pain      sodium chloride (OCEAN, BABY AYR) 0.65 % nasal spray 1 spray by Nasal route 4 times daily as needed for Congestion      azelastine (ASTELIN) 0.1 % nasal spray 1 spray by Nasal route 2 times daily      diphenhydrAMINE (BENYLIN) 12.5 MG/5ML liquid Take 25 mg by mouth 4 times daily as needed for Allergies (migrane break through)      Artificial Saliva (BIOTENE DRY MOUTH MOISTURIZING MT) Take 1 spray by mouth every 3 hours      OIL OF OREGANO PO Take 60 mg by mouth daily (with breakfast)       miconazole (MICOTIN) 2 % powder Apply topically daily Apply to groin, under breast and skin folds.  magnesium hydroxide (MILK OF MAGNESIA) 400 MG/5ML suspension Take 30 mLs by mouth 4 times daily as needed for Constipation      SENNA LEAVES PO Take 470 mg by mouth three times daily       promethazine (PHENERGAN) 25 MG tablet Take 25 mg by mouth every 6 hours as needed for Nausea      triamcinolone (KENALOG) 0.1 % cream Apply topically daily as needed (rash)      NONFORMULARY Take 2 capsules by mouth 2 times daily (with meals) Turmeric Ginger      potassium chloride (KLOR-CON) 20 MEQ packet Take 20 mEq by mouth daily 90 packet 2    levomilnacipran (FETZIMA) 40 MG CP24 extended release capsule Take 40 mg by mouth daily      clonazePAM (KLONOPIN) 0.5 MG tablet Take 0.5 mg by mouth 2 times daily. [place] [employer] econazole nitrate 1 % cream Apply topically daily as needed (Apply under breasts and folds)       fluticasone (FLONASE) 50 MCG/ACT nasal spray 1 spray by Nasal route 2 times daily       fluticasone-salmeterol (ADVAIR HFA) 230-21 MCG/ACT inhaler Inhale 2 puffs into the lungs 2 times daily      ipratropium-albuterol (DUONEB) 0.5-2.5 (3) MG/3ML SOLN nebulizer solution Inhale 1 vial into the lungs every 4 hours as needed for Shortness of Breath       montelukast (SINGULAIR) 10 MG tablet Take 10 mg by mouth nightly      ondansetron (ZOFRAN-ODT) 8 MG TBDP disintegrating tablet Take 8 mg by mouth every 8 hours as needed for Nausea or Vomiting       rOPINIRole (REQUIP) 0.5 MG tablet Take 1 tablet by mouth See Admin Instructions 1 tablet - breakfast  2 tablets - supper (Patient taking differently: Take 0.5 mg by mouth See Admin Instructions 1 tablet qAM and 2 tablets qPM) 270 tablet 1     No current facility-administered medications for this visit.        Social History     Socioeconomic History    Marital status:      Spouse name: Not on file    Number of children: 0    Years of education: 12    Highest education level: Bachelor's degree (e.g., BA, AB, BS)   Occupational History    Not on file   Tobacco Use    Smoking status: Never Smoker    Smokeless tobacco: Never Used   Vaping Use    Vaping Use: Never used   Substance and Sexual Activity    Alcohol use: No    Drug use: No    Sexual activity: Not Currently     Partners: Male   Other Topics Concern    Not on file   Social History Narrative    Patient is trying to exercise by walking her new puppy around the house. Plan is for puppy to become her support/seeing eye dog. [employer] will assist in training after puppy learns the basics. Patient does not visit with friends or relatives d/t COVID     Social Determinants of Health     Financial Resource Strain: Low Risk     Difficulty of Paying Living Expenses: Not very hard   Food Insecurity: No Food Insecurity    Worried About Running Out of Food in the Last Year: Never true    Leanne of Food in the Last Year: Never true   Transportation Needs: Unmet Transportation Needs    Lack of Transportation (Medical): Yes    Lack of Transportation (Non-Medical): No   Physical Activity: Insufficiently Active    Days of Exercise per Week: 7 days    Minutes of Exercise per Session: 20 min   Stress: Stress Concern Present    Feeling of Stress : To some extent   Social Connections: Moderately Integrated    Frequency of Communication with Friends and Family:  Three times a week    Frequency of Social Gatherings with Friends and Family: Never    Attends Mu-ism Services: More than 4 times per year    Active Member of [employer] or Organizations: No    Attends Club or Organization Meetings: Never    Marital Status:    Intimate Partner Violence:     Fear of Current or Ex-Partner:     Emotionally Abused:     Physically Abused:     Sexually Abused:        Family History   Problem Relation Age of Onset    Alzheimer's Disease Mother     Alzheimer's Disease Father     Heart Disease Father 59        MI  Stents    Stroke Father     Stroke Sister     Osteoporosis Sister     Hypertension Brother     High Cholesterol Brother     Alzheimer's Disease Paternal Aunt     Alzheimer's Disease Paternal Uncle        Review of Systems:  Heart: as above   Lungs: as above   Eyes: denies changes in vision or discharge. Ears: denies changes in hearing or pain. Nose: denies epistaxis or masses   Throat: denies sore throat or trouble swallowing. Neuro: denies numbness, tingling, tremors. Skin: denies rashes or itching. : denies hematuria, dysuria   GI: denies vomiting, diarrhea   Psych: denies mood changed, anxiety, depression. All other systems negative. Physical Exam   BP (!) 136/90   Pulse 91   Resp 16   Ht 5' 2\" (1.575 m)   Wt 205 lb 9.6 oz (93.3 kg)   BMI 37.60 kg/m²   Constitutional: Oriented to person, place, and time. Well-developed and well-nourished. No distress. Head: Normocephalic and atraumatic. Eyes: EOM are normal. Pupils are equal, round, and reactive to light. Neck: Normal range of motion. Neck supple. No hepatojugular reflux and no JVD present. Carotid bruit is not present. No tracheal deviation present. No thyromegaly present. Cardiovascular: Normal rate, regular rhythm, normal heart sounds and intact distal pulses. Exam reveals no gallop and no friction rub. No murmur heard. Pulmonary/Chest: Effort normal and breath sounds normal. No respiratory distress. No wheezes. No rales. No tenderness. Abdominal: Soft. Bowel sounds are normal. No distension and no mass. No tenderness. No rebound and no guarding. Musculoskeletal: Normal range of motion. No edema and no tenderness. Lymphadenopathy:   No cervical adenopathy. No groin adenopathy. Neurological: Alert and oriented to person, place, and time. Skin: Skin is warm and dry. No rash noted. Not diaphoretic. No erythema.    Psychiatric: Stroke-like symptoms    TIA (transient ischemic attack)    Weakness of extremity    Subcutaneous nodule of abdominal wall    Flu vaccine need    Lower abdominal pain    Acute upper respiratory infection    Acute urinary tract infection    Optic neuritis    Immune deficiency disorder (HCC)    Wrist sprain, right, subsequent encounter    Viral pneumonia     1. YADAV/Abnormal EKG:     Multiple risk factors. Pharm stress low risk 1/27/2021.     2. POTS: Stable symptoms at this point. 3. DM    4. PAULINE with CPAP    5. Lipids    6. Neurological symptoms/TIA. Rachel Meza D.O.   Cardiologist  Cardiology, [facility]

## 2021-05-27 ENCOUNTER — TELEMEDICINE (OUTPATIENT)
Dept: NEUROLOGY | Age: 46
End: 2021-05-27
Payer: MEDICAID

## 2021-05-27 DIAGNOSIS — G43.911 INTRACTABLE MIGRAINE WITH STATUS MIGRAINOSUS, UNSPECIFIED MIGRAINE TYPE: Primary | ICD-10-CM

## 2021-05-27 PROCEDURE — 99212 OFFICE O/P EST SF 10 MIN: CPT | Performed by: NURSE PRACTITIONER

## 2021-05-27 RX ORDER — SENNA PLUS 8.6 MG/1
8.6 TABLET ORAL 2 TIMES DAILY PRN
Status: ON HOLD | COMMUNITY
Start: 2021-04-26 | End: 2021-07-28

## 2021-05-27 NOTE — PROGRESS NOTES
[facility]. [place] FABIAN Caban, F.A.C.P. Jadyn Velasquez, ANDREZ, APRN, CNS  Mahesh Holloway. Aimee Grady, MSN, APRN-FNP-C  Hamilton Lutz MSN, APRN, FNP-C  Arik CONNELL PA-C  [address] MSN, APRN, FNP-C  [address], [address]  [place], [address] [address]  Phone: [phone]  Fax: [phone]             Christian Camarillo is a 39 y.o. female evaluated via telephone on 2021. The patient and/or health care decision maker is aware that that he/she may receive a bill for this telephone service, depending on his/her insurance coverage, and has provided verbal consent to proceed: Yes    I affirm this is a Patient Initiated Episode with an Established Patient who has not had a related appointment within my department in the past 7 days or scheduled within the next 24 hours. The patient's identity was verified at the start of the call. Others involved in call: no one    Total Time: minutes: 5-10 minutes    Note: not billable if this call serves to triage the patient into an appointment for the relevant concern    HPI:     Since patient's last visit she has done well. Her migraines now are less severe and are less in duration; 24 to 48 hours each. Patient is not getting migraines monthly anymore. Patient reports currently she has a headache since [employer] on . Patient believes it was caused by the fluorescent lights and music. She admits she was not wearing her glasses at the time. Patient has been taking over-the-counter medications in addition to Benadryl and Zofran with no relief/ of headache. Prior to [employer] on , patient had had no headaches since her last visit. Patient continues to take melatonin for sleep at night but admits she has not slept well all week. Patient plans to have a sleep study completed however medications have to be weaned prior to this.   Chi Skyler has been decreased down to 20 mg since her last visit; previously taken 36 mg.    Patient continues to follow with neuro-ophthalmologist.  On her last virtual visit, he had completed more testing however nothing to conclude a definite diagnosis of MS. Patient continues on IVIG 1 time monthly. Patient also switched from gamma flex x1 which increased her blood glucose to over 500--  patient now is on Gammagard. There have been no new medical issues since her last visit. (+) headache  No chest pain or palpitations  No SOB  No vertigo, lightheadedness or loss of consciousness  No falls, tripping or stumbling  No incontinence of bowels or bladder  No itching or bruising   No numbness, tingling or focal arm/leg weakness    ROS otherwise negative      Medications:     Prior to Admission medications    Medication Sig Start Date End Date Taking? Authorizing Provider   senna (SENOKOT) 8.6 MG tablet Take 8.6 mg by mouth 2 times daily as needed 4/26/21  Yes Historical Provider, MD   Galcanezumab-gnlm 120 MG/ML SOAJ Inject 120 mg into the skin every 30 days 5/27/21 6/26/21 Yes REYNOLD Hernandez NP   cimetidine (TAGAMET) 300 MG tablet Take 300 mg by mouth 3 times daily   Yes Historical Provider, MD   methylPREDNISolone (MEDROL) 4 MG tablet Take 2 mg by mouth daily   Yes Historical Provider, MD   levothyroxine (SYNTHROID) 50 MCG tablet Take 2.5 tablets by mouth Five times weekly Given Monday,Tuesday,Wednesday,Thursday,Friday 2/8/21  Yes Dougie Brooks DO   CPAP Machine MISC by Does not apply route nightly. 5-10cm.    Yes Historical Provider, MD   Immune Globulin, Human, (GAMMAGARD IV) Infuse intravenously 40 gm per port once a month   Yes Historical Provider, MD   propranolol (INDERAL) 10 MG tablet TAKE 1 TABLET BY MOUTH THREE TIMES DAILY 1/25/21  Yes Dougie Brooks DO   ascorbic acid (VITAMIN C) 500 MG tablet TAKE 1 TABLET BY MOUTH EVERY DAY 1/2/21  Yes Historical Provider, MD   divalproex (DEPAKOTE ER) 500 MG extended release tablet TAKE 1 TABLET BY MOUTH THREE TIMES DAILY 12/24/20 Yes Historical Provider, MD   zinc 50 MG CAPS Take 50 mg by mouth daily 1/2/21  Yes Carline Delarosa APRN - CNP   polyethylene glycol (GLYCOLAX) 17 g packet Take 17 g by mouth 2 times daily as needed for Constipation 12/28/20  Yes Dougie Brooks DO   Polyvinyl Alcohol-Povidone (REFRESH OP) Apply to eye Artificial tears   Yes Historical Provider, MD   insulin glargine (LANTUS SOLOSTAR) 100 UNIT/ML injection pen Injects 8 units in the morning and 14 units at night 11/10/20  Yes Historical Provider, MD   ketorolac (TORADOL) 10 MG tablet Take 1 tablet by mouth every 6 hours as needed for Pain 10/19/20  Yes Jacobo Valdivia DO   Magnesium 500 MG TABS Take 1 tablet by mouth 2 times daily   Yes Historical Provider, MD   insulin lispro, 1 Unit Dial, (HUMALOG KWIKPEN) 100 UNIT/ML SOPN Inject into the skin daily Sliding scale: 150-200- 4 units, then add 2 units for every 50 up to a max of 12 units.    Yes Historical Provider, MD   Probiotic Product (PROBIOTIC DAILY PO) daily    Yes Historical Provider, MD   furosemide (LASIX) 20 MG tablet Take 1 tablet by mouth daily 10/5/20  Yes Dougie Brooks DO   rOPINIRole (REQUIP) 0.5 MG tablet Take 1 tablet by mouth See Admin Instructions 1 tablet - breakfast  2 tablets - supper  Patient taking differently: Take 0.5 mg by mouth See Admin Instructions 1 tablet qAM and 2 tablets qPM 9/1/20 5/27/21 Yes Vicenta Wallace DO   pantoprazole (PROTONIX) 40 MG tablet Take 1 tablet by mouth every morning (before breakfast)  Patient taking differently: Take 40 mg by mouth 2 times daily  9/1/20  Yes Vicenta Wallace DO   folic acid (FOLVITE) 1 MG tablet Take 1 tablet by mouth Daily with lunch 9/1/20  Yes Vicenta Wallace DO   acetaminophen (TYLENOL) 325 MG tablet Take 650 mg by mouth every 6 hours as needed for Pain   Yes Historical Provider, MD   sodium chloride (OCEAN, BABY AYR) 0.65 % nasal spray 1 spray by Nasal route 4 times daily as needed for Congestion   Yes Historical Provider, MD azelastine (ASTELIN) 0.1 % nasal spray 1 spray by Nasal route 2 times daily   Yes Historical Provider, MD   diphenhydrAMINE (BENYLIN) 12.5 MG/5ML liquid Take 25 mg by mouth 4 times daily as needed for Allergies (migrane break through)   Yes Historical Provider, MD   Artificial Saliva (BIOTENE DRY MOUTH MOISTURIZING MT) Take 1 spray by mouth every 3 hours   Yes Historical Provider, MD   OIL OF OREGANO PO Take 60 mg by mouth daily (with breakfast)    Yes Historical Provider, MD   miconazole (MICOTIN) 2 % powder Apply topically daily Apply to groin, under breast and skin folds. Yes Historical Provider, MD   magnesium hydroxide (MILK OF MAGNESIA) 400 MG/5ML suspension Take 30 mLs by mouth 4 times daily as needed for Constipation   Yes Historical Provider, MD   SENNA LEAVES PO Take 470 mg by mouth three times daily    Yes Historical Provider, MD   triamcinolone (KENALOG) 0.1 % cream Apply topically daily as needed (rash)   Yes Historical Provider, MD   NONFORMULARY Take 2 capsules by mouth 2 times daily (with meals) Turmeric Ginger   Yes Historical Provider, MD   potassium chloride (KLOR-CON) 20 MEQ packet Take 20 mEq by mouth daily 8/19/20 5/27/21 Yes Vicenta Wallace,    levomilnacipran (FETZIMA) 40 MG CP24 extended release capsule Take 20 mg by mouth daily    Yes Historical Provider, MD   clonazePAM (KLONOPIN) 0.5 MG tablet Take 0.5 mg by mouth 2 times daily. .   Yes Historical Provider, MD   econazole nitrate 1 % cream Apply topically daily as needed (Apply under breasts and folds)    Yes Historical Provider, MD   fluticasone (FLONASE) 50 MCG/ACT nasal spray 1 spray by Nasal route 2 times daily    Yes Historical Provider, MD   fluticasone-salmeterol (ADVAIR HFA) 230-21 MCG/ACT inhaler Inhale 2 puffs into the lungs 2 times daily   Yes Historical Provider, MD   ipratropium-albuterol (DUONEB) 0.5-2.5 (3) MG/3ML SOLN nebulizer solution Inhale 1 vial into the lungs every 4 hours as needed for Shortness of Breath    Yes Historical Provider, MD   montelukast (SINGULAIR) 10 MG tablet Take 10 mg by mouth nightly   Yes Historical Provider, MD   ondansetron (ZOFRAN-ODT) 8 MG TBDP disintegrating tablet Take 8 mg by mouth every 8 hours as needed for Nausea or Vomiting    Yes Historical Provider, MD   aspirin 81 MG EC tablet Take 81 mg by mouth daily  Patient not taking: Reported on 5/27/2021    Historical Provider, MD   promethazine (PHENERGAN) 25 MG tablet Take 25 mg by mouth every 6 hours as needed for Nausea  Patient not taking: Reported on 5/27/2021    Historical Provider, MD       Objective:     Mental Status Exam: sounds alert, oriented x4; thought processes appropriate    Speech sounds clear    Breathing Effort sounds normal    Laboratory/Radiology:     CBC:   Lab Results   Component Value Date    WBC 9.2 05/13/2021    RBC 5.08 05/13/2021    RBC 4.31 03/11/2020    HGB 15.1 05/13/2021    HCT 45.2 05/13/2021    MCV 89.0 05/13/2021    MCH 29.7 05/13/2021    MCHC 33.4 05/13/2021    RDW 12.4 05/13/2021     05/13/2021    MPV 10.7 05/13/2021     CMP:    Lab Results   Component Value Date     05/13/2021    K 4.6 05/13/2021    CL 99 05/13/2021    CO2 29 05/13/2021    BUN 24 05/13/2021    CREATININE 0.8 05/13/2021    GFRAA >60 05/13/2021    AGRATIO 1.0 01/22/2021    LABGLOM >60 05/13/2021    GLUCOSE 303 05/13/2021    PROT 6.8 05/13/2021    LABALBU 4.1 05/13/2021    CALCIUM 8.9 05/13/2021    BILITOT 0.3 05/13/2021    ALKPHOS 58 05/13/2021    AST 17 05/13/2021    ALT 30 05/13/2021     U/A:    Lab Results   Component Value Date    NITRITE NEG 09/24/2020    COLORU Yellow 05/13/2021    PROTEINU Negative 05/13/2021    PHUR 8.0 05/13/2021    WBCUA 0-1 10/18/2020    RBCUA NONE 10/18/2020    BACTERIA RARE 10/18/2020    CLARITYU Clear 05/13/2021    SPECGRAV 1.015 05/13/2021    LEUKOCYTESUR Negative 05/13/2021    UROBILINOGEN 0.2 05/13/2021    BILIRUBINUR Negative 05/13/2021    BILIRUBINUR NEG 09/24/2020    BLOODU Negative 05/13/2021 malformation              Patient started on aspirin; not started on statin              A1c 7.0, LDL 64              Currently on Zanaflex, magnesium, verapamil, propranolol, Depakote and Emgality                          Does not need additional therapy at this time--- med list should be simplified if able              Has failed multiple other therapies in the past              Patient has failed to keep a headache diary             Patient's clinical presentation is most likely being complicated by her multiple medications.     Last hemiplegic migraine reportedly on February 8; current headache is not hemiplegic in origin   Headaches have been way less frequent and less severe since starting Emgality;     current headache since church on Sunday prior to church this was controlled     Reported history of pseudotumor cerebri, asymptomatic meningitis, immune deficiency disorder, and dysphasia              Also reportedly being worked up for MS--- MRIs without contrast unremarkable              MRI of brain and cervical spine with contrast were unremarkable for acute process              Blind bilaterally worse to left eye--- can see shadow in right eye              Recent LP positive for oligoclonal banding; NMO <1.5, WBC 4 but elevated protein and glucose              Patient is on IVIG once monthly and on Gammagard through her immunologist at [facility]              Patient will continue to follow with her neuro-ophthalmologist     Plan:     Recommend taking 3 mg of melatonin at 6 PM and another 3 mg at 9 PM to abort current headache   Tylenol, Zofran and Benadryl have been unsuccessful all week  Continue Emgality and magnesium   Emgality refilled today  Continue low-dose statin and aspirin for stroke prevention  Call with any issues  Return office in 3 months      REYNOLD Lovett NP, REYNOLD NP-C  1:45 PM  5/27/2021

## 2021-05-27 NOTE — PROGRESS NOTES
Jimbo Ochoa was read the following message We want to confirm that, for purposes of billing, this is a virtual visit with your provider for which we will submit a claim for reimbursement with your insurance company. You will be responsible for any copays, coinsurance amounts or other amounts not covered by your insurance company. If you do not accept this, unfortunately we will not be able to schedule or proceed with a virtual visit with the provider. Do you accept? Shae Garcia responded Yes .

## 2021-06-21 ENCOUNTER — TELEPHONE (OUTPATIENT)
Dept: CARDIOLOGY CLINIC | Age: 46
End: 2021-06-21

## 2021-06-21 NOTE — TELEPHONE ENCOUNTER
I was first at  er in [place] and was checked for chf. Dr said i had a few flatlines on my ekg but nothing to worry about. Last night swelling in legs and feet, arms and hands, shortness of breath at rest and up moving. My pulse ox was going up and down lowest was 85% so my  took me to our local er. Wbc, Rbc, hgb, hct, lymphocytes were slightly low. My rdw was slightly high but Pro-B-Natriuretic Peptide was really elevated at 592. Glucose is elevated (from methylprednisolone) and Biaxin cause my lung Dr assumed with the productive coughing, feeling warn out, shortness of breath and having to sit up and sinus congestion it was infection. Which it has helped that. I will contact my cardiologist even though the local er dr said the peptide has nothing to do with the heart but I feel that the heart dr should be aware since Im even having trouble bending knees, wrists, fingers, toes, and pain in legs from the swelling while walking. Please advise. Thanks Monroe Back.

## 2021-06-21 NOTE — TELEPHONE ENCOUNTER
Noted. If symptoms get worse then OV in next month. Romana Schwalbe, D.O.   Cardiologist  Cardiology, [facility]

## 2021-06-29 ENCOUNTER — OFFICE VISIT (OUTPATIENT)
Dept: CARDIOLOGY CLINIC | Age: 46
End: 2021-06-29
Payer: MEDICAID

## 2021-06-29 VITALS
WEIGHT: 209 LBS | BODY MASS INDEX: 38.46 KG/M2 | DIASTOLIC BLOOD PRESSURE: 82 MMHG | HEART RATE: 75 BPM | HEIGHT: 62 IN | SYSTOLIC BLOOD PRESSURE: 102 MMHG | RESPIRATION RATE: 14 BRPM

## 2021-06-29 DIAGNOSIS — E78.5 HYPERLIPIDEMIA, UNSPECIFIED HYPERLIPIDEMIA TYPE: Primary | ICD-10-CM

## 2021-06-29 PROCEDURE — G8427 DOCREV CUR MEDS BY ELIG CLIN: HCPCS | Performed by: INTERNAL MEDICINE

## 2021-06-29 PROCEDURE — 1036F TOBACCO NON-USER: CPT | Performed by: INTERNAL MEDICINE

## 2021-06-29 PROCEDURE — 99214 OFFICE O/P EST MOD 30 MIN: CPT | Performed by: INTERNAL MEDICINE

## 2021-06-29 PROCEDURE — 93000 ELECTROCARDIOGRAM COMPLETE: CPT | Performed by: INTERNAL MEDICINE

## 2021-06-29 PROCEDURE — G8417 CALC BMI ABV UP PARAM F/U: HCPCS | Performed by: INTERNAL MEDICINE

## 2021-06-29 NOTE — PROGRESS NOTES
CHIEF COMPLAINT: YADAV/POTS/Abnormal EKG    HISTORY OF PRESENT ILLNESS: Patient is a 39 y.o. female seen at the request of Roge Gama DO. Patient seen for abnormal EKG and YADAV. Hx of POTS and multiple risk factors including DM. No overt CP.     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     Connective tissue disease (HCC)     Cyst of right breast     DDD (degenerative disc disease), cervical     Depression     Diabetes mellitus (HCC)     Dysphagia     Esophagus disorder     needs stretched ocassionally    Fibromyalgia     Gastroparesis     GERD (gastroesophageal reflux disease)     Headache     Hematuria     Hyperlipidemia     Hypertension     Hypothyroidism     IBS (irritable bowel syndrome)     with constipation and diarrhea    Immune deficiency disorder (HCC)     Insomnia     Kidney stones     Meningitis     x4--- twice after IVIG     PAULINE on CPAP     cpap dependence    PCOS (polycystic ovarian syndrome)     POTS (postural orthostatic tachycardia syndrome)     Problems with swallowing     due restrictive esophagus    Pseudotumor cerebri     in 2008--- treated with LP; allergy to diamox    Raynaud's disease     Rectal bleeding     RLS (restless legs syndrome)     Scleroderma ([address]     Sjogren's disease ([address]     Thyroid nodule     TIA (transient ischemic attack)        Patient Active Problem List   Diagnosis    Vision loss, bilateral    Type 2 diabetes mellitus without complication, with long-term current use of insulin (HCC)    Thyroid nodule    Solitary cyst of right breast    Sjogren's syndrome with keratoconjunctivitis sicca ([address]    Retention of urine    Restless legs    Raynaud's phenomenon without gangrene    Primary fibromyalgia syndrome    Postural orthostatic tachycardia syndrome    PAULINE on CPAP    Obesity, Class II, BMI 35-39.9    Hyperlipidemia    Other dysphagia    Lung nodule    Long term current use of insulin (HCC)    Insomnia    Intractable chronic migraine without aura and without status migrainosus    Hypothyroidism    Hypogammaglobulinemia (HCC)    History of renal calculi    Gastroparesis    Gastroesophageal reflux disease    Essential hypertension    DDD (degenerative disc disease)    CVID (common variable immunodeficiency) (HCC)    Migraine    Irritable bowel syndrome    Constipation    Diarrhea    Benign intracranial hypertension    Depression    Anxiety    Anemia    Polycystic disease, ovaries    Cervicalgia    Asthma    Connective tissue disorder ([facility])    Stroke-like symptoms    TIA (transient ischemic attack)    Weakness of extremity    Subcutaneous nodule of abdominal wall    Flu vaccine need    Lower abdominal pain    Acute upper respiratory infection    Acute urinary tract infection    Optic neuritis    Immune deficiency disorder ([facility])    Wrist sprain, right, subsequent encounter    Viral pneumonia       Allergies   Allergen Reactions    Cymbalta [Duloxetine Hcl]      suicidal ideation     Diamox [Acetazolamide] Shortness Of Breath and Rash    Doxepin Anaphylaxis     Itchy sore throat problems with swallowing    Duloxetine Anaphylaxis     Other reaction(s): suicidal ideation    Grapeseed Extract [Nutritional Supplements] Anaphylaxis     Grapes ( red dye     Levaquin [Levofloxacin In D5w] Anaphylaxis     Foreman face lips swelling just Levaquin tolerated D5W)    Lipitor [Atorvastatin]      Causes muscle pain    Other Anaphylaxis     Strawberries hives in the mouth SOB    Pecan Nut (Diagnostic) Anaphylaxis     SOB Hives in the mouth itch    Prochlorperazine Anaphylaxis      HIGH Compazine involuntary muscle spasms low doses tolerated)    Red Dye Anaphylaxis    Reglan [Metoclopramide] Anaphylaxis     Whole body flipping around anxious involuntary muscles spasms    intravenously 40 gm per port once a month      propranolol (INDERAL) 10 MG tablet TAKE 1 TABLET BY MOUTH THREE TIMES DAILY 90 tablet 3    ascorbic acid (VITAMIN C) 500 MG tablet TAKE 1 TABLET BY MOUTH EVERY DAY      divalproex (DEPAKOTE ER) 500 MG extended release tablet TAKE 1 TABLET BY MOUTH THREE TIMES DAILY      zinc 50 MG CAPS Take 50 mg by mouth daily 30 capsule 0    polyethylene glycol (GLYCOLAX) 17 g packet Take 17 g by mouth 2 times daily as needed for Constipation 527 g 2    Polyvinyl Alcohol-Povidone (REFRESH OP) Apply to eye Artificial tears      insulin glargine (LANTUS SOLOSTAR) 100 UNIT/ML injection pen Injects 8 units in the morning and 14 units at night      ketorolac (TORADOL) 10 MG tablet Take 1 tablet by mouth every 6 hours as needed for Pain 20 tablet 0    Magnesium 500 MG TABS Take 1 tablet by mouth 2 times daily      insulin lispro, 1 Unit Dial, (HUMALOG KWIKPEN) 100 UNIT/ML SOPN Inject into the skin daily Sliding scale: 150-200- 4 units, then add 2 units for every 50 up to a max of 12 units.       Probiotic Product (PROBIOTIC DAILY PO) daily       furosemide (LASIX) 20 MG tablet Take 1 tablet by mouth daily 60 tablet 3    rOPINIRole (REQUIP) 0.5 MG tablet Take 1 tablet by mouth See Admin Instructions 1 tablet - breakfast  2 tablets - supper (Patient taking differently: Take 0.5 mg by mouth See Admin Instructions 1 tablet qAM and 2 tablets qPM) 270 tablet 1    pantoprazole (PROTONIX) 40 MG tablet Take 1 tablet by mouth every morning (before breakfast) (Patient taking differently: Take 40 mg by mouth 2 times daily ) 90 tablet 1    folic acid (FOLVITE) 1 MG tablet Take 1 tablet by mouth Daily with lunch 90 tablet 1    acetaminophen (TYLENOL) 325 MG tablet Take 650 mg by mouth every 6 hours as needed for Pain      sodium chloride (OCEAN, BABY AYR) 0.65 % nasal spray 1 spray by Nasal route 4 times daily as needed for Congestion      azelastine (ASTELIN) 0.1 % nasal spray 1 spray by Nasal route 2 times daily      diphenhydrAMINE (BENYLIN) 12.5 MG/5ML liquid Take 25 mg by mouth 4 times daily as needed for Allergies (migrane break through)      Artificial Saliva (BIOTENE DRY MOUTH MOISTURIZING MT) Take 1 spray by mouth every 3 hours      OIL OF OREGANO PO Take 60 mg by mouth daily (with breakfast)       miconazole (MICOTIN) 2 % powder Apply topically daily Apply to groin, under breast and skin folds.  magnesium hydroxide (MILK OF MAGNESIA) 400 MG/5ML suspension Take 30 mLs by mouth 4 times daily as needed for Constipation      SENNA LEAVES PO Take 470 mg by mouth three times daily       promethazine (PHENERGAN) 25 MG tablet Take 25 mg by mouth every 6 hours as needed for Nausea       NONFORMULARY Take 2 capsules by mouth 2 times daily (with meals) Turmeric Ginger      potassium chloride (KLOR-CON) 20 MEQ packet Take 20 mEq by mouth daily 90 packet 2    clonazePAM (KLONOPIN) 0.5 MG tablet Take 0.5 mg by mouth 2 times daily. Jovita Drain econazole nitrate 1 % cream Apply topically daily as needed (Apply under breasts and folds)       fluticasone (FLONASE) 50 MCG/ACT nasal spray 1 spray by Nasal route 2 times daily       fluticasone-salmeterol (ADVAIR HFA) 230-21 MCG/ACT inhaler Inhale 2 puffs into the lungs 2 times daily      ipratropium-albuterol (DUONEB) 0.5-2.5 (3) MG/3ML SOLN nebulizer solution Inhale 1 vial into the lungs every 4 hours as needed for Shortness of Breath       montelukast (SINGULAIR) 10 MG tablet Take 10 mg by mouth nightly      ondansetron (ZOFRAN-ODT) 8 MG TBDP disintegrating tablet Take 8 mg by mouth every 8 hours as needed for Nausea or Vomiting       aspirin 81 MG EC tablet Take 81 mg by mouth daily (Patient not taking: Reported on 6/29/2021)      triamcinolone (KENALOG) 0.1 % cream Apply topically daily as needed (rash) (Patient not taking: Reported on 6/29/2021)      levomilnacipran (FETZIMA) 40 MG CP24 extended release capsule Take 20 mg by mouth daily (Patient not taking: Reported on 6/29/2021)       No current facility-administered medications for this visit. Social History     Socioeconomic History    Marital status:      Spouse name: Not on file    Number of children: 0    Years of education: 12    Highest education level: Bachelor's degree (e.g., BA, AB, BS)   Occupational History    Not on file   Tobacco Use    Smoking status: Never Smoker    Smokeless tobacco: Never Used   Vaping Use    Vaping Use: Never used   Substance and Sexual Activity    Alcohol use: No    Drug use: No    Sexual activity: Not Currently     Partners: Male   Other Topics Concern    Not on file   Social History Narrative    Patient is trying to exercise by walking her new puppy around the house. Plan is for puppy to become her support/seeing eye dog. [employer] will assist in training after puppy learns the basics. Patient does not visit with friends or relatives d/t COVID     Social Determinants of Health     Financial Resource Strain: Low Risk     Difficulty of Paying Living Expenses: Not very hard   Food Insecurity: No Food Insecurity    Worried About Running Out of Food in the Last Year: Never true    Leanne of Food in the Last Year: Never true   Transportation Needs: Unmet Transportation Needs    Lack of Transportation (Medical): Yes    Lack of Transportation (Non-Medical): No   Physical Activity: Insufficiently Active    Days of Exercise per Week: 7 days    Minutes of Exercise per Session: 20 min   Stress: Stress Concern Present    Feeling of Stress : To some extent   Social Connections: Moderately Integrated    Frequency of Communication with Friends and Family:  Three times a week    Frequency of Social Gatherings with Friends and Family: Never    Attends Confucianist Services: More than 4 times per year    Active Member of [employer] or Organizations: No    Attends Club or Organization Meetings: Never    Marital Status:  Intimate Partner Violence:     Fear of Current or Ex-Partner:     Emotionally Abused:     Physically Abused:     Sexually Abused:        Family History   Problem Relation Age of Onset    Alzheimer's Disease Mother     Alzheimer's Disease Father     Heart Disease Father 59        MI  Stents    Stroke Father     Stroke Sister     Osteoporosis Sister     Hypertension Brother     High Cholesterol Brother     Alzheimer's Disease Paternal Aunt     Alzheimer's Disease Paternal Uncle        Review of Systems:  Heart: as above   Lungs: as above   Eyes: denies changes in vision or discharge. Ears: denies changes in hearing or pain. Nose: denies epistaxis or masses   Throat: denies sore throat or trouble swallowing. Neuro: denies numbness, tingling, tremors. Skin: denies rashes or itching. : denies hematuria, dysuria   GI: denies vomiting, diarrhea   Psych: denies mood changed, anxiety, depression. All other systems negative. Physical Exam   /82   Pulse 75   Resp 14   Ht 5' 2\" (1.575 m)   Wt 209 lb (94.8 kg)   BMI 38.23 kg/m²   Constitutional: Oriented to person, place, and time. Well-developed and well-nourished. No distress. Head: Normocephalic and atraumatic. Eyes: EOM are normal. Pupils are equal, round, and reactive to light. Neck: Normal range of motion. Neck supple. No hepatojugular reflux and no JVD present. Carotid bruit is not present. No tracheal deviation present. No thyromegaly present. Cardiovascular: Normal rate, regular rhythm, normal heart sounds and intact distal pulses. Exam reveals no gallop and no friction rub. No murmur heard. Pulmonary/Chest: Effort normal and breath sounds normal. No respiratory distress. No wheezes. No rales. No tenderness. Abdominal: Soft. Bowel sounds are normal. No distension and no mass. No tenderness. No rebound and no guarding. Musculoskeletal: Normal range of motion. No edema and no tenderness.    Lymphadenopathy:   No cervical adenopathy. No groin adenopathy. Neurological: Alert and oriented to person, place, and time. Skin: Skin is warm and dry. No rash noted. Not diaphoretic. No erythema. Psychiatric: Normal mood and affect. Behavior is normal.     EKG personally reviewed 06/29/21:  normal sinus rhythm, nonspecific ST and T waves changes, Q waves in anteroseptal.    ECHO CONCLUSIONS 12/18/2020:  - Exam indication: Systemic Sclerosis, Pulmonary Disease, SOB  - The left ventricle is normal in size. Left ventricular systolic function is normal. EF = 74 ± 5% (2D biplane) Normal left ventricular diastolic function.  - The right ventricle is normal in size. Right ventricular systolic function is normal.  _ No significant valvular abnormality identified.  - Exam was compared with the prior  echocardiographic exam performed on 4/11/2018. There is no significant change.   Electronically signed by Carlos Dacosta on 12/18/2020 at 7:14:53 PM    ASSESSMENT AND PLAN:  Patient Active Problem List   Diagnosis    Vision loss, bilateral    Type 2 diabetes mellitus without complication, with long-term current use of insulin ([address]    Thyroid nodule    Solitary cyst of right breast    Sjogren's syndrome with keratoconjunctivitis sicca ([address]    Retention of urine    Restless legs    Raynaud's phenomenon without gangrene    Primary fibromyalgia syndrome    Postural orthostatic tachycardia syndrome    PAULINE on CPAP    Obesity, Class II, BMI 35-39.9    Hyperlipidemia    Other dysphagia    Lung nodule    Long term current use of insulin (HCC)    Insomnia    Intractable chronic migraine without aura and without status migrainosus    Hypothyroidism    Hypogammaglobulinemia (HCC)    History of renal calculi    Gastroparesis    Gastroesophageal reflux disease    Essential hypertension    DDD (degenerative disc disease)    CVID (common variable immunodeficiency) (HCC)    Migraine    Irritable bowel syndrome    Constipation

## 2021-07-12 ENCOUNTER — TELEPHONE (OUTPATIENT)
Dept: NEUROLOGY | Age: 46
End: 2021-07-12

## 2021-07-12 NOTE — TELEPHONE ENCOUNTER
Patient called in today, she states that migraines are getting worse. She also states that nothing is working, she even tried the cocktail. Please advise.   Electronically signed by Korin Weaver MA on 7/12/21 at 3:27 PM EDT

## 2021-07-13 NOTE — TELEPHONE ENCOUNTER
Please provide her with nurtec samples if she is able to come pick them up; if not I can send a script to her pharmacy. Thanks.

## 2021-07-14 ENCOUNTER — HOSPITAL ENCOUNTER (EMERGENCY)
Age: 46
Discharge: HOME | End: 2021-07-14
Payer: MEDICAID

## 2021-07-14 ENCOUNTER — APPOINTMENT (OUTPATIENT)
Dept: CT IMAGING | Age: 46
End: 2021-07-14
Payer: MEDICAID

## 2021-07-14 VITALS
HEIGHT: 62 IN | TEMPERATURE: 97.1 F | HEART RATE: 78 BPM | OXYGEN SATURATION: 98 % | DIASTOLIC BLOOD PRESSURE: 90 MMHG | WEIGHT: 204 LBS | SYSTOLIC BLOOD PRESSURE: 138 MMHG | BODY MASS INDEX: 37.54 KG/M2 | RESPIRATION RATE: 16 BRPM

## 2021-07-14 LAB
ALBUMIN SERPL-MCNC: 4 G/DL (ref 3.5–5.2)
ALP BLD-CCNC: 48 U/L (ref 35–104)
ALT SERPL-CCNC: 28 U/L (ref 0–32)
ANION GAP SERPL CALCULATED.3IONS-SCNC: 8 MMOL/L (ref 7–16)
AST SERPL-CCNC: 18 U/L (ref 0–31)
BACTERIA: NORMAL /HPF
BASOPHILS ABSOLUTE: 0.03 E9/L (ref 0–0.2)
BASOPHILS RELATIVE PERCENT: 0.7 % (ref 0–2)
BILIRUB SERPL-MCNC: 0.4 MG/DL (ref 0–1.2)
BILIRUBIN URINE: NEGATIVE
BLOOD, URINE: ABNORMAL
BUN BLDV-MCNC: 14 MG/DL (ref 6–20)
CALCIUM SERPL-MCNC: 9.2 MG/DL (ref 8.6–10.2)
CHLORIDE BLD-SCNC: 105 MMOL/L (ref 98–107)
CLARITY: CLEAR
CO2: 27 MMOL/L (ref 22–29)
COLOR: YELLOW
CREAT SERPL-MCNC: 0.6 MG/DL (ref 0.5–1)
EOSINOPHILS ABSOLUTE: 0.03 E9/L (ref 0.05–0.5)
EOSINOPHILS RELATIVE PERCENT: 0.7 % (ref 0–6)
EPITHELIAL CELLS, UA: NORMAL /HPF
GFR AFRICAN AMERICAN: >60
GFR NON-AFRICAN AMERICAN: >60 ML/MIN/1.73
GLUCOSE BLD-MCNC: 203 MG/DL (ref 74–99)
GLUCOSE URINE: 100 MG/DL
HCT VFR BLD CALC: 37.6 % (ref 34–48)
HEMOGLOBIN: 12.2 G/DL (ref 11.5–15.5)
IMMATURE GRANULOCYTES #: 0.02 E9/L
IMMATURE GRANULOCYTES %: 0.4 % (ref 0–5)
KETONES, URINE: NEGATIVE MG/DL
LACTIC ACID: 1.4 MMOL/L (ref 0.5–2.2)
LEUKOCYTE ESTERASE, URINE: NEGATIVE
LIPASE: 74 U/L (ref 13–60)
LYMPHOCYTES ABSOLUTE: 1.42 E9/L (ref 1.5–4)
LYMPHOCYTES RELATIVE PERCENT: 31.8 % (ref 20–42)
MCH RBC QN AUTO: 30.2 PG (ref 26–35)
MCHC RBC AUTO-ENTMCNC: 32.4 % (ref 32–34.5)
MCV RBC AUTO: 93.1 FL (ref 80–99.9)
MONOCYTES ABSOLUTE: 0.23 E9/L (ref 0.1–0.95)
MONOCYTES RELATIVE PERCENT: 5.1 % (ref 2–12)
NEUTROPHILS ABSOLUTE: 2.74 E9/L (ref 1.8–7.3)
NEUTROPHILS RELATIVE PERCENT: 61.3 % (ref 43–80)
NITRITE, URINE: NEGATIVE
PDW BLD-RTO: 15.5 FL (ref 11.5–15)
PH UA: 7.5 (ref 5–9)
PLATELET # BLD: 169 E9/L (ref 130–450)
PMV BLD AUTO: 10.9 FL (ref 7–12)
POTASSIUM REFLEX MAGNESIUM: 4.2 MMOL/L (ref 3.5–5)
PROTEIN UA: NEGATIVE MG/DL
RBC # BLD: 4.04 E12/L (ref 3.5–5.5)
RBC UA: NORMAL /HPF (ref 0–2)
SODIUM BLD-SCNC: 140 MMOL/L (ref 132–146)
SPECIFIC GRAVITY UA: 1.01 (ref 1–1.03)
STREP GRP A PCR: NEGATIVE
TOTAL PROTEIN: 6.4 G/DL (ref 6.4–8.3)
TROPONIN, HIGH SENSITIVITY: 7 NG/L (ref 0–9)
UROBILINOGEN, URINE: 0.2 E.U./DL
WBC # BLD: 4.5 E9/L (ref 4.5–11.5)
WBC UA: NORMAL /HPF (ref 0–5)

## 2021-07-14 PROCEDURE — 6360000002 HC RX W HCPCS: Performed by: NURSE PRACTITIONER

## 2021-07-14 PROCEDURE — 99283 EMERGENCY DEPT VISIT LOW MDM: CPT

## 2021-07-14 PROCEDURE — 80053 COMPREHEN METABOLIC PANEL: CPT

## 2021-07-14 PROCEDURE — 83690 ASSAY OF LIPASE: CPT

## 2021-07-14 PROCEDURE — 36415 COLL VENOUS BLD VENIPUNCTURE: CPT

## 2021-07-14 PROCEDURE — 83605 ASSAY OF LACTIC ACID: CPT

## 2021-07-14 PROCEDURE — 70450 CT HEAD/BRAIN W/O DYE: CPT

## 2021-07-14 PROCEDURE — 6370000000 HC RX 637 (ALT 250 FOR IP): Performed by: NURSE PRACTITIONER

## 2021-07-14 PROCEDURE — 87088 URINE BACTERIA CULTURE: CPT

## 2021-07-14 PROCEDURE — 84484 ASSAY OF TROPONIN QUANT: CPT

## 2021-07-14 PROCEDURE — 81001 URINALYSIS AUTO W/SCOPE: CPT

## 2021-07-14 PROCEDURE — 85025 COMPLETE CBC W/AUTO DIFF WBC: CPT

## 2021-07-14 PROCEDURE — 2580000003 HC RX 258: Performed by: NURSE PRACTITIONER

## 2021-07-14 PROCEDURE — 72125 CT NECK SPINE W/O DYE: CPT

## 2021-07-14 PROCEDURE — 96375 TX/PRO/DX INJ NEW DRUG ADDON: CPT

## 2021-07-14 PROCEDURE — 96374 THER/PROPH/DIAG INJ IV PUSH: CPT

## 2021-07-14 PROCEDURE — 87880 STREP A ASSAY W/OPTIC: CPT

## 2021-07-14 RX ORDER — 0.9 % SODIUM CHLORIDE 0.9 %
1000 INTRAVENOUS SOLUTION INTRAVENOUS ONCE
Status: COMPLETED | OUTPATIENT
Start: 2021-07-14 | End: 2021-07-14

## 2021-07-14 RX ORDER — HEPARIN SODIUM (PORCINE) LOCK FLUSH IV SOLN 100 UNIT/ML 100 UNIT/ML
100 SOLUTION INTRAVENOUS ONCE
Status: COMPLETED | OUTPATIENT
Start: 2021-07-14 | End: 2021-07-14

## 2021-07-14 RX ORDER — KETOROLAC TROMETHAMINE 30 MG/ML
30 INJECTION, SOLUTION INTRAMUSCULAR; INTRAVENOUS ONCE
Status: COMPLETED | OUTPATIENT
Start: 2021-07-14 | End: 2021-07-14

## 2021-07-14 RX ORDER — DIPHENHYDRAMINE HYDROCHLORIDE 50 MG/ML
25 INJECTION INTRAMUSCULAR; INTRAVENOUS ONCE
Status: COMPLETED | OUTPATIENT
Start: 2021-07-14 | End: 2021-07-14

## 2021-07-14 RX ORDER — ONDANSETRON 4 MG/1
4 TABLET, ORALLY DISINTEGRATING ORAL ONCE
Status: COMPLETED | OUTPATIENT
Start: 2021-07-14 | End: 2021-07-14

## 2021-07-14 RX ADMIN — ONDANSETRON 4 MG: 4 TABLET, ORALLY DISINTEGRATING ORAL at 13:10

## 2021-07-14 RX ADMIN — HEPARIN 100 UNITS: 100 SYRINGE at 15:10

## 2021-07-14 RX ADMIN — SODIUM CHLORIDE 1000 ML: 9 INJECTION, SOLUTION INTRAVENOUS at 13:05

## 2021-07-14 RX ADMIN — DIPHENHYDRAMINE HYDROCHLORIDE 25 MG: 50 INJECTION INTRAMUSCULAR; INTRAVENOUS at 13:55

## 2021-07-14 RX ADMIN — KETOROLAC TROMETHAMINE 30 MG: 30 INJECTION, SOLUTION INTRAMUSCULAR; INTRAVENOUS at 13:55

## 2021-07-14 ASSESSMENT — PAIN DESCRIPTION - PROGRESSION: CLINICAL_PROGRESSION: NOT CHANGED

## 2021-07-14 ASSESSMENT — PAIN DESCRIPTION - LOCATION: LOCATION: HEAD

## 2021-07-14 ASSESSMENT — PAIN DESCRIPTION - PAIN TYPE: TYPE: ACUTE PAIN

## 2021-07-14 ASSESSMENT — PAIN DESCRIPTION - ONSET: ONSET: ON-GOING

## 2021-07-14 ASSESSMENT — PAIN SCALES - GENERAL
PAINLEVEL_OUTOF10: 9
PAINLEVEL_OUTOF10: 9

## 2021-07-14 ASSESSMENT — PAIN DESCRIPTION - DESCRIPTORS: DESCRIPTORS: HEADACHE

## 2021-07-14 ASSESSMENT — PAIN DESCRIPTION - FREQUENCY: FREQUENCY: CONTINUOUS

## 2021-07-14 NOTE — ED PROVIDER NOTES
ED Attending  CC: No       [address] [facility]  Department of Emergency Medicine   ED  Encounter Note  Admit Date/RoomTime: 2021 11:41 AM  ED Room:     NAME: Rachel Mars  : 1975  MRN: [id number]     Chief Complaint:  Migraine (seeing a Neurologist who sent her in for a cat scan. Pt was given a new medication yesterday which did take the pain down some but then the pain came back. Pt states \"Things just dont seem right. \"  Having trouble with walking, feels like she is going to fall and has been bumping into things.  ) and Neck Pain (for 3 days. )    History of Present Illness      Rachel Mars is a 39 y.o. old female who presents to the emergency department by private vehicle, for complaint of waxing and waning episodes bilateral and occipital aching pain which began several year(s) prior to arrival.  Since onset the symptoms have been waxing and waning and mild-moderate in severity. The patient has history of migraine headaches diagnosed in the past.   Today's episode is somewhat typical for her. She has had CT, MRI, neurology consult and PCP evaluation in the past. The pain is associated with nausea, photophobia, photopsia and ataxia and negative for amaurosis, diplopia, other visual changes, paralysis/weakness, involuntary movements, tremor or speech impairment. The symptoms are aggravated by movement and relieved by nothing. There has been no history of recent trauma. Treatment prior to arrival consisted of: OTC NSAID's with moderate relief of symptoms. She takes no blood thinning agents. Patient states that she was recently started on a new medication by Dr. Ladonna Abdi for her migraine headaches. She states that she has been having worsening symptoms of her migraines for the last 4 months. She states that she has followed up with her neurologist Dr. Ladonna Abdi but was sent to the emergency department for a CAT scan of her head.  Patient states that she has fallen 3 times in 4 months when she has her migraines she becomes unsteady on her feet. ROS   Pertinent positives and negatives are stated within HPI, all other systems reviewed and are negative. Past Medical History:  has a past medical history of Abnormal Pap smear of cervix, Anemia, Anxiety, Asthma, Blind, Connective tissue disease ([address] Cyst of right breast, DDD (degenerative disc disease), cervical, Depression, Diabetes mellitus ([address] Dysphagia, Esophagus disorder, Fibromyalgia, Gastroparesis, GERD (gastroesophageal reflux disease), Headache, Hematuria, Hyperlipidemia, Hypertension, Hypothyroidism, IBS (irritable bowel syndrome), Immune deficiency disorder ([address] Insomnia, Kidney stones, Meningitis, Meningitis, PAULINE on CPAP, PCOS (polycystic ovarian syndrome), POTS (postural orthostatic tachycardia syndrome), Problems with swallowing, Pseudotumor cerebri, Raynaud's disease, Rectal bleeding, RLS (restless legs syndrome), Scleroderma ([address] Sjogren's disease ([address] Thyroid nodule, and TIA (transient ischemic attack). Surgical History:  has a past surgical history that includes Hysterectomy; Cholecystectomy; Appendectomy; Carpal tunnel release (Bilateral); Port Surgery; and Pyloroplasty. Social History:  reports that she has never smoked. She has never used smokeless tobacco. She reports that she does not drink alcohol and does not use drugs. Family History: family history includes Alzheimer's Disease in her father, mother, paternal aunt, and paternal uncle; Heart Disease (age of onset: 59) in her father; High Cholesterol in her brother; Hypertension in her brother; Osteoporosis in her sister; Stroke in her father and sister.      Allergies: Cymbalta [duloxetine hcl], Diamox [acetazolamide], Doxepin, Duloxetine, Grapeseed extract [nutritional supplements], Levaquin [levofloxacin in d5w], Lipitor [atorvastatin], Other, Pecan nut (diagnostic), Prochlorperazine, Red dye, Reglan [metoclopramide], Rocephin [ceftriaxone], Rosemary oil, Topamax [topiramate], Tramadol, Triptans, [place] [macadamia nut oil], Abilify [aripiprazole], Aspartame and phenylalanine, Cefdinir, Darvon [propoxyphene], Dilaudid [hydromorphone hcl], Doxycycline, Effexor [venlafaxine], Gluten meal, Hydrochlorothiazide, Iron, Meclizine, Milk-related compounds, Sulfa antibiotics, Tetracyclines & related, Wheat bran, Zithromax [azithromycin], Adhesive tape, and Betamethasone    Physical Exam   Oxygen Saturation Interpretation: Normal.        ED Triage Vitals   BP Temp Temp Source Pulse Resp SpO2 Height Weight   07/14/21 1141 07/14/21 1141 07/14/21 1141 07/14/21 1141 07/14/21 1141 07/14/21 1141 07/14/21 1154 07/14/21 1154   (!) 148/100 97.1 °F (36.2 °C) Infrared 82 16 98 % 5' 2\" (1.575 m) 204 lb (92.5 kg)         Constitutional:  Alert, development consistent with age. HEENT:  NC/NT. PERRLA. Airway patent. Neck:  Normal ROM. Supple. No meningeal signs  Respiratory:  Clear to auscultation and breath sounds equal.  CV:  Regular rate and rhythm, normal heart sounds, without pathological murmurs, ectopy, gallops, or rubs. GI:  Abdomen Soft, nontender, good bowel sounds. No firm or pulsatile mass. Back:  No costovertebral tenderness. Extremities: No tenderness or edema noted. Integument:  Normal turgor. Warm, dry, without visible rash, unless noted elsewhere. Lymphatics: No lymphangitis or adenopathy noted. Neurological:  Oriented x 3, GCS 15. CNII-XII grossly intact. Motor functions intact.      Lab / Imaging Results   (All laboratory and radiology results have been personally reviewed by myself)  Labs:  Results for orders placed or performed during the hospital encounter of 07/14/21   Strep Screen Group A Throat    Specimen: Throat   Result Value Ref Range    Strep Grp A PCR Negative Negative   CBC Auto Differential   Result Value Ref Range    WBC 4.5 4.5 - 11.5 E9/L    RBC 4.04 3.50 - 5.50 E12/L    Hemoglobin 12.2 11.5 - 15.5 g/dL    Hematocrit 37.6 34.0 - 48.0 %    MCV 93.1 80.0 - 99.9 fL    MCH 30.2 26.0 - 35.0 pg    MCHC 32.4 32.0 - 34.5 %    RDW 15.5 (H) 11.5 - 15.0 fL    Platelets 136 185 - 047 E9/L    MPV 10.9 7.0 - 12.0 fL    Neutrophils % 61.3 43.0 - 80.0 %    Immature Granulocytes % 0.4 0.0 - 5.0 %    Lymphocytes % 31.8 20.0 - 42.0 %    Monocytes % 5.1 2.0 - 12.0 %    Eosinophils % 0.7 0.0 - 6.0 %    Basophils % 0.7 0.0 - 2.0 %    Neutrophils Absolute 2.74 1.80 - 7.30 E9/L    Immature Granulocytes # 0.02 E9/L    Lymphocytes Absolute 1.42 (L) 1.50 - 4.00 E9/L    Monocytes Absolute 0.23 0.10 - 0.95 E9/L    Eosinophils Absolute 0.03 (L) 0.05 - 0.50 E9/L    Basophils Absolute 0.03 0.00 - 0.20 E9/L   Comprehensive Metabolic Panel w/ Reflex to MG   Result Value Ref Range    Sodium 140 132 - 146 mmol/L    Potassium reflex Magnesium 4.2 3.5 - 5.0 mmol/L    Chloride 105 98 - 107 mmol/L    CO2 27 22 - 29 mmol/L    Anion Gap 8 7 - 16 mmol/L    Glucose 203 (H) 74 - 99 mg/dL    BUN 14 6 - 20 mg/dL    CREATININE 0.6 0.5 - 1.0 mg/dL    GFR Non-African American >60 >=60 mL/min/1.73    GFR African American >60     Calcium 9.2 8.6 - 10.2 mg/dL    Total Protein 6.4 6.4 - 8.3 g/dL    Albumin 4.0 3.5 - 5.2 g/dL    Total Bilirubin 0.4 0.0 - 1.2 mg/dL    Alkaline Phosphatase 48 35 - 104 U/L    ALT 28 0 - 32 U/L    AST 18 0 - 31 U/L   Lactic Acid, Plasma   Result Value Ref Range    Lactic Acid 1.4 0.5 - 2.2 mmol/L   Lipase   Result Value Ref Range    Lipase 74 (H) 13 - 60 U/L   Urinalysis, reflex to microscopic   Result Value Ref Range    Color, UA Yellow Straw/Yellow    Clarity, UA Clear Clear    Glucose, Ur 100 (A) Negative mg/dL    Bilirubin Urine Negative Negative    Ketones, Urine Negative Negative mg/dL    Specific Gravity, UA 1.010 1.005 - 1.030    Blood, Urine TRACE-INTACT Negative    pH, UA 7.5 5.0 - 9.0    Protein, UA Negative Negative mg/dL    Urobilinogen, Urine 0.2 <2.0 E.U./dL    Nitrite, Urine Negative Negative    Leukocyte Esterase, Urine Negative Negative   Troponin Result Value Ref Range    Troponin, High Sensitivity 7 0 - 9 ng/L   Microscopic Urinalysis   Result Value Ref Range    WBC, UA NONE 0 - 5 /HPF    RBC, UA 0-1 0 - 2 /HPF    Epithelial Cells, UA RARE /HPF    Bacteria, UA NONE SEEN None Seen /HPF     Imaging: All Radiology results interpreted by Radiologist unless otherwise noted. CT CERVICAL SPINE WO CONTRAST   Final Result   1. There is no acute fracture of the cervical spine   2. Degenerative disc and degenerative joint disease at the C4-5 level more   prominent on the left. CT Head WO Contrast   Final Result   No acute intracranial abnormality. Specifically, there is no acute   intracranial hemorrhage. ED Course / Medical Decision Making     Medications   0.9 % sodium chloride bolus (0 mLs Intravenous Stopped 7/14/21 1405)   ondansetron (ZOFRAN-ODT) disintegrating tablet 4 mg (4 mg Oral Given 7/14/21 1310)   ketorolac (TORADOL) injection 30 mg (30 mg Intravenous Given 7/14/21 1355)   diphenhydrAMINE (BENADRYL) injection 25 mg (25 mg Intravenous Given 7/14/21 1355)   heparin flush 100 UNIT/ML injection 100 Units (100 Units Intracatheter Given 7/14/21 1510)        Re-examination:  7/14/21       Time:1500  Patients condition is improving after treatment. States that she is feeling much better after medication. Consult(s):   None    Procedure(s):   none    MDM:   At this time the patient is without objective evidence of an acute process requiring hospitalization or inpatient management. They have remained hemodynamically stable throughout their entire ED visit and are stable for discharge with outpatient follow-up. The plan has been discussed in detail and they are aware of the specific conditions for emergent return, as well as the importance of follow-up. She was seen and evaluated by Dr. Fede Farah.  Patient at this time is nontoxic in appearance and in no distress she is able for outpatient follow-up with her neurologist. Patient is educated continue taking her new medication that was prescribed. She states that it is helping her. Patient was also educated that if any symptoms should worsen any time she is to return to the emergency department. Patient is agreeable to plan of care all questions were answered. Patient is easily ambulatory without any gait disturbance. Plan of Care/Counseling:  REYNOLD Moon CNP reviewed today's visit with the patient in addition to providing specific details for the plan of care and counseling regarding the diagnosis and prognosis. Questions are answered at this time and are agreeable with the plan. Assessment      1. Chronic migraine      Plan   Discharged home. Patient condition is good    New Medications     Discharge Medication List as of 7/14/2021  3:01 PM        Electronically signed by REYNOLD Moon CNP   DD: 7/14/21  **This report was transcribed using voice recognition software. Every effort was made to ensure accuracy; however, inadvertent computerized transcription errors may be present.   END OF ED PROVIDER NOTE     REYNOLD Bradshaw CNP  07/14/21 7866

## 2021-07-14 NOTE — ED NOTES
Medport flushed w/ heparin and removed.  No complaints from PT after access removal.      Geovanny Weiss RN  07/14/21 2888

## 2021-07-15 ENCOUNTER — HOSPITAL ENCOUNTER (EMERGENCY)
Age: 46
Discharge: HOME | End: 2021-07-15
Attending: EMERGENCY MEDICINE
Payer: MEDICAID

## 2021-07-15 ENCOUNTER — APPOINTMENT (OUTPATIENT)
Dept: CT IMAGING | Age: 46
End: 2021-07-15
Payer: MEDICAID

## 2021-07-15 VITALS
HEIGHT: 62 IN | RESPIRATION RATE: 20 BRPM | HEART RATE: 71 BPM | TEMPERATURE: 96.9 F | OXYGEN SATURATION: 100 % | DIASTOLIC BLOOD PRESSURE: 65 MMHG | BODY MASS INDEX: 37.54 KG/M2 | SYSTOLIC BLOOD PRESSURE: 109 MMHG | WEIGHT: 204 LBS

## 2021-07-15 DIAGNOSIS — R51.9 HEADACHE, UNSPECIFIED HEADACHE TYPE: Primary | ICD-10-CM

## 2021-07-15 LAB
ALBUMIN SERPL-MCNC: 3.7 G/DL (ref 3.5–5.2)
ALP BLD-CCNC: 42 U/L (ref 35–104)
ALT SERPL-CCNC: 28 U/L (ref 0–32)
ANION GAP SERPL CALCULATED.3IONS-SCNC: 8 MMOL/L (ref 7–16)
AST SERPL-CCNC: 45 U/L (ref 0–31)
BASOPHILS ABSOLUTE: 0.03 E9/L (ref 0–0.2)
BASOPHILS RELATIVE PERCENT: 0.5 % (ref 0–2)
BILIRUB SERPL-MCNC: 0.3 MG/DL (ref 0–1.2)
BUN BLDV-MCNC: 15 MG/DL (ref 6–20)
CALCIUM SERPL-MCNC: 8.6 MG/DL (ref 8.6–10.2)
CHLORIDE BLD-SCNC: 104 MMOL/L (ref 98–107)
CO2: 25 MMOL/L (ref 22–29)
CREAT SERPL-MCNC: 0.7 MG/DL (ref 0.5–1)
EKG ATRIAL RATE: 68 BPM
EKG ATRIAL RATE: 69 BPM
EKG P AXIS: 50 DEGREES
EKG P AXIS: 57 DEGREES
EKG P-R INTERVAL: 158 MS
EKG P-R INTERVAL: 158 MS
EKG Q-T INTERVAL: 414 MS
EKG Q-T INTERVAL: 430 MS
EKG QRS DURATION: 78 MS
EKG QRS DURATION: 80 MS
EKG QTC CALCULATION (BAZETT): 443 MS
EKG QTC CALCULATION (BAZETT): 457 MS
EKG R AXIS: 20 DEGREES
EKG R AXIS: 22 DEGREES
EKG T AXIS: 116 DEGREES
EKG T AXIS: 126 DEGREES
EKG VENTRICULAR RATE: 68 BPM
EKG VENTRICULAR RATE: 69 BPM
EOSINOPHILS ABSOLUTE: 0.08 E9/L (ref 0.05–0.5)
EOSINOPHILS RELATIVE PERCENT: 1.4 % (ref 0–6)
GFR AFRICAN AMERICAN: >60
GFR NON-AFRICAN AMERICAN: >60 ML/MIN/1.73
GLUCOSE BLD-MCNC: 176 MG/DL (ref 74–99)
HCT VFR BLD CALC: 38.7 % (ref 34–48)
HEMOGLOBIN: 12.2 G/DL (ref 11.5–15.5)
IMMATURE GRANULOCYTES #: 0.03 E9/L
IMMATURE GRANULOCYTES %: 0.5 % (ref 0–5)
LYMPHOCYTES ABSOLUTE: 2.69 E9/L (ref 1.5–4)
LYMPHOCYTES RELATIVE PERCENT: 47.3 % (ref 20–42)
MCH RBC QN AUTO: 29.9 PG (ref 26–35)
MCHC RBC AUTO-ENTMCNC: 31.5 % (ref 32–34.5)
MCV RBC AUTO: 94.9 FL (ref 80–99.9)
MONOCYTES ABSOLUTE: 0.38 E9/L (ref 0.1–0.95)
MONOCYTES RELATIVE PERCENT: 6.7 % (ref 2–12)
NEUTROPHILS ABSOLUTE: 2.48 E9/L (ref 1.8–7.3)
NEUTROPHILS RELATIVE PERCENT: 43.6 % (ref 43–80)
PDW BLD-RTO: 15.4 FL (ref 11.5–15)
PLATELET # BLD: 183 E9/L (ref 130–450)
PMV BLD AUTO: 10.9 FL (ref 7–12)
POTASSIUM SERPL-SCNC: 4.7 MMOL/L (ref 3.5–5)
RBC # BLD: 4.08 E12/L (ref 3.5–5.5)
SODIUM BLD-SCNC: 137 MMOL/L (ref 132–146)
TOTAL PROTEIN: 6.2 G/DL (ref 6.4–8.3)
WBC # BLD: 5.7 E9/L (ref 4.5–11.5)

## 2021-07-15 PROCEDURE — 80053 COMPREHEN METABOLIC PANEL: CPT

## 2021-07-15 PROCEDURE — 93005 ELECTROCARDIOGRAM TRACING: CPT | Performed by: NURSE PRACTITIONER

## 2021-07-15 PROCEDURE — 70496 CT ANGIOGRAPHY HEAD: CPT

## 2021-07-15 PROCEDURE — 85025 COMPLETE CBC W/AUTO DIFF WBC: CPT

## 2021-07-15 PROCEDURE — 93005 ELECTROCARDIOGRAM TRACING: CPT | Performed by: EMERGENCY MEDICINE

## 2021-07-15 PROCEDURE — 6360000002 HC RX W HCPCS: Performed by: EMERGENCY MEDICINE

## 2021-07-15 PROCEDURE — 6360000004 HC RX CONTRAST MEDICATION: Performed by: RADIOLOGY

## 2021-07-15 PROCEDURE — 93010 ELECTROCARDIOGRAM REPORT: CPT | Performed by: INTERNAL MEDICINE

## 2021-07-15 PROCEDURE — 96374 THER/PROPH/DIAG INJ IV PUSH: CPT

## 2021-07-15 PROCEDURE — 99284 EMERGENCY DEPT VISIT MOD MDM: CPT

## 2021-07-15 PROCEDURE — 96372 THER/PROPH/DIAG INJ SC/IM: CPT

## 2021-07-15 RX ORDER — DROPERIDOL 2.5 MG/ML
0.62 INJECTION, SOLUTION INTRAMUSCULAR; INTRAVENOUS EVERY 6 HOURS PRN
Status: DISCONTINUED | OUTPATIENT
Start: 2021-07-15 | End: 2021-07-15 | Stop reason: HOSPADM

## 2021-07-15 RX ORDER — DIPHENHYDRAMINE HYDROCHLORIDE 50 MG/ML
25 INJECTION INTRAMUSCULAR; INTRAVENOUS ONCE
Status: COMPLETED | OUTPATIENT
Start: 2021-07-15 | End: 2021-07-15

## 2021-07-15 RX ADMIN — DIPHENHYDRAMINE HYDROCHLORIDE 25 MG: 50 INJECTION, SOLUTION INTRAMUSCULAR; INTRAVENOUS at 03:51

## 2021-07-15 RX ADMIN — DROPERIDOL 0.62 MG: 2.5 INJECTION, SOLUTION INTRAMUSCULAR; INTRAVENOUS at 03:51

## 2021-07-15 RX ADMIN — IOPAMIDOL 60 ML: 755 INJECTION, SOLUTION INTRAVENOUS at 04:41

## 2021-07-15 ASSESSMENT — PAIN DESCRIPTION - LOCATION: LOCATION: HEAD

## 2021-07-15 ASSESSMENT — PAIN DESCRIPTION - DESCRIPTORS: DESCRIPTORS: ACHING;THROBBING

## 2021-07-15 ASSESSMENT — PAIN DESCRIPTION - FREQUENCY: FREQUENCY: CONTINUOUS

## 2021-07-15 ASSESSMENT — PAIN DESCRIPTION - PAIN TYPE: TYPE: ACUTE PAIN

## 2021-07-15 ASSESSMENT — PAIN SCALES - GENERAL: PAINLEVEL_OUTOF10: 8

## 2021-07-15 NOTE — ED PROVIDER NOTES
HPI:  7/15/21, Time: 3:14 AM EDT         Jaleesa Alvarenga is a 39 y.o. female presenting to the ED for headache tight band around her head, beginning 4 days ago. The complaint has been persistent, moderate in severity, and worsened by nothing. Patient reports that she has been having headache for the last 4 days. Patient reports tight band around her head. Patient reporting nausea but no vomiting she reports photophobia. She reports no fever chills she reports no chest pains or shortness of breath or abdominal pain. Patient reporting that she does feel weak in her right leg. Patient reports she was seen in [place] and had CT of the head for the same headache today. Patient reporting still having symptoms. Patient does report that she does have family history of brain aneurysm sister had aneurysm per her report    ROS:   Pertinent positives and negatives are stated within HPI, all other systems reviewed and are negative.  --------------------------------------------- PAST HISTORY ---------------------------------------------  Past Medical History:  has a past medical history of Abnormal Pap smear of cervix, Anemia, Anxiety, Asthma, Blind, Connective tissue disease ([address] Cyst of right breast, DDD (degenerative disc disease), cervical, Depression, Diabetes mellitus ([address] Dysphagia, Esophagus disorder, Fibromyalgia, Gastroparesis, GERD (gastroesophageal reflux disease), Headache, Hematuria, Hyperlipidemia, Hypertension, Hypothyroidism, IBS (irritable bowel syndrome), Immune deficiency disorder ([address] Insomnia, Kidney stones, Meningitis, Meningitis, PAULINE on CPAP, PCOS (polycystic ovarian syndrome), POTS (postural orthostatic tachycardia syndrome), Problems with swallowing, Pseudotumor cerebri, Raynaud's disease, Rectal bleeding, RLS (restless legs syndrome), Scleroderma ([address] Sjogren's disease ([address] Thyroid nodule, and TIA (transient ischemic attack).     Past Surgical History:  has a past surgical history that includes Hysterectomy; Cholecystectomy; Appendectomy; Carpal tunnel release (Bilateral); Port Surgery; and Pyloroplasty. Social History:  reports that she has never smoked. She has never used smokeless tobacco. She reports that she does not drink alcohol and does not use drugs. Family History: family history includes Alzheimer's Disease in her father, mother, paternal aunt, and paternal uncle; Heart Disease (age of onset: 59) in her father; High Cholesterol in her brother; Hypertension in her brother; Osteoporosis in her sister; Stroke in her father and sister. The patients home medications have been reviewed. Allergies: Cymbalta [duloxetine hcl], Diamox [acetazolamide], Doxepin, Duloxetine, Grapeseed extract [nutritional supplements], Levaquin [levofloxacin in d5w], Lipitor [atorvastatin], Other, Pecan nut (diagnostic), Prochlorperazine, Red dye, Reglan [metoclopramide], Rocephin [ceftriaxone], Rosemary oil, Topamax [topiramate], Tramadol, Triptans, [place] [macadamia nut oil], Abilify [aripiprazole], Aspartame and phenylalanine, Cefdinir, Darvon [propoxyphene], Dilaudid [hydromorphone hcl], Doxycycline, Effexor [venlafaxine], Gluten meal, Hydrochlorothiazide, Iron, Meclizine, Milk-related compounds, Sulfa antibiotics, Tetracyclines & related, Wheat bran, Zithromax [azithromycin], Adhesive tape, and Betamethasone    ---------------------------------------------------PHYSICAL EXAM--------------------------------------    Constitutional/General: Alert and oriented x3,   Head: Normocephalic and atraumatic  Eyes: PERRL, EOMI  Mouth: Oropharynx clear, handling secretions, no trismus  Neck: Supple, full ROM, non tender to palpation in the midline, no stridor, no crepitus, no meningeal signs  Pulmonary: Lungs clear to auscultation bilaterally, no wheezes, rales, or rhonchi. Not in respiratory distress  Cardiovascular:  Regular rate. Regular rhythm. No murmurs, gallops, or rubs.  2+ distal pulses  Chest: no chest wall tenderness  Abdomen: Soft. Non tender. Non distended. +BS. No rebound, guarding, or rigidity. No pulsatile masses appreciated. Musculoskeletal: Moves all extremities x 4. Warm and well perfused, no clubbing, cyanosis, or edema. Capillary refill <3 seconds  Skin: warm and dry. No rashes. Neurologic: GCS 15, CN 2-12 grossly intact, no focal deficits, symmetric strength 5/5 in the upper patient has noted weakness to right lower extremities as far as plantar flexion dorsiflexion able to raise right leg but slightly weaker compared to left. Psych: Normal Affect    -------------------------------------------------- RESULTS -------------------------------------------------  I have personally reviewed all laboratory and imaging results for this patient. Results are listed below.      LABS:  Results for orders placed or performed during the hospital encounter of 07/15/21   CBC Auto Differential   Result Value Ref Range    WBC 5.7 4.5 - 11.5 E9/L    RBC 4.08 3.50 - 5.50 E12/L    Hemoglobin 12.2 11.5 - 15.5 g/dL    Hematocrit 38.7 34.0 - 48.0 %    MCV 94.9 80.0 - 99.9 fL    MCH 29.9 26.0 - 35.0 pg    MCHC 31.5 (L) 32.0 - 34.5 %    RDW 15.4 (H) 11.5 - 15.0 fL    Platelets 439 414 - 930 E9/L    MPV 10.9 7.0 - 12.0 fL    Neutrophils % 43.6 43.0 - 80.0 %    Immature Granulocytes % 0.5 0.0 - 5.0 %    Lymphocytes % 47.3 (H) 20.0 - 42.0 %    Monocytes % 6.7 2.0 - 12.0 %    Eosinophils % 1.4 0.0 - 6.0 %    Basophils % 0.5 0.0 - 2.0 %    Neutrophils Absolute 2.48 1.80 - 7.30 E9/L    Immature Granulocytes # 0.03 E9/L    Lymphocytes Absolute 2.69 1.50 - 4.00 E9/L    Monocytes Absolute 0.38 0.10 - 0.95 E9/L    Eosinophils Absolute 0.08 0.05 - 0.50 E9/L    Basophils Absolute 0.03 0.00 - 0.20 E9/L   Comprehensive Metabolic Panel   Result Value Ref Range    Sodium 137 132 - 146 mmol/L    Potassium 4.7 3.5 - 5.0 mmol/L    Chloride 104 98 - 107 mmol/L    CO2 25 22 - 29 mmol/L    Anion Gap 8 7 - 16 mmol/L    Glucose 176 (H) 74 - 99 mg/dL    BUN 15 6 - 20 mg/dL    CREATININE 0.7 0.5 - 1.0 mg/dL    GFR Non-African American >60 >=60 mL/min/1.73    GFR African American >60     Calcium 8.6 8.6 - 10.2 mg/dL    Total Protein 6.2 (L) 6.4 - 8.3 g/dL    Albumin 3.7 3.5 - 5.2 g/dL    Total Bilirubin 0.3 0.0 - 1.2 mg/dL    Alkaline Phosphatase 42 35 - 104 U/L    ALT 28 0 - 32 U/L    AST 45 (H) 0 - 31 U/L   EKG 12 Lead   Result Value Ref Range    Ventricular Rate 68 BPM    Atrial Rate 68 BPM    P-R Interval 158 ms    QRS Duration 80 ms    Q-T Interval 430 ms    QTc Calculation (Bazett) 457 ms    P Axis 50 degrees    R Axis 20 degrees    T Axis 126 degrees       RADIOLOGY:  Interpreted by Radiologist.  CTA HEAD W CONTRAST   Final Result   Unremarkable CTA of the head. EKG:  This EKG is signed and interpreted by me. Rate: 68  Rhythm: Sinus  Interpretation: non-specific EKG  Comparison: stable as compared to patient's most recent EKG        ------------------------- NURSING NOTES AND VITALS REVIEWED ---------------------------   The nursing notes within the ED encounter and vital signs as below have been reviewed by myself. /65   Pulse 71   Temp 96.9 °F (36.1 °C) (Tympanic)   Resp 20   Ht 5' 2\" (1.575 m)   Wt 204 lb (92.5 kg)   SpO2 100%   BMI 37.31 kg/m²   Oxygen Saturation Interpretation: Normal    The patients available past medical records and past encounters were reviewed. ------------------------------ ED COURSE/MEDICAL DECISION MAKING----------------------  Medications   droperidol (INAPSINE) injection 0.625 mg (0.625 mg Intramuscular Given 7/15/21 0351)   diphenhydrAMINE (BENADRYL) injection 25 mg (25 mg Intravenous Given 7/15/21 8720)   iopamidol (ISOVUE-370) 76 % injection 60 mL (60 mLs Intravenous Given 7/15/21 4034)             Medical Decision Making:   Presenting here because of ongoing headache for the last 4 days. Patient reporting she has been seen at outlying facility and still having symptoms. Patient reporting feeling weak in her right lower extremity. Patient reporting no chest pain or difficulty breathing. Labs noted reviewed as well as CT was ordered due to her complaint of headache as well as weakness to her right lower extremity. CTA was within normal limits. Patient medicated with droperidol and Benadryl. Symptoms have completely resolved. Patient neurologically intact she has 5 out of 5 strength in all extremities. Re-Evaluations:             Patient reevaluated here in emergency department patient was given droperidol as well as Benadryl. Patient reporting relief of her headache. Patient no longer feels weak. Patient neurologically intact on recheck. Patient made aware of findings and plan. Patient will be discharged home with outpatient follow-up she is to return anytime for any further problems. Consultations:             \    Critical Care: This patient's ED course included: a personal history and physicial eaxmination    This patient has been closely monitored during their ED course. Counseling: The emergency provider has spoken with the patient and discussed todays results, in addition to providing specific details for the plan of care and counseling regarding the diagnosis and prognosis. Questions are answered at this time and they are agreeable with the plan.       --------------------------------- IMPRESSION AND DISPOSITION ---------------------------------    IMPRESSION  1. Headache, unspecified headache type        DISPOSITION  Disposition: Discharge home  Patient condition is stable        NOTE: This report was transcribed using voice recognition software.  Every effort was made to ensure accuracy; however, inadvertent computerized transcription errors may be present          Padmini Zhang MD  07/15/21 [address]17 [facility],[address], [place]  07/15/21 7233

## 2021-07-15 NOTE — ED NOTES
FIRST PROVIDER CONTACT ASSESSMENT NOTE        Department of Emergency Medicine            ED  First Provider Note            7/15/21  12:36 AM EDT    Chief Complaint: Migraine (Migraine x4 days \"Feels like a band in around my head\"; seen at [facility] ED today; Irriatable;denies chest pain sob n/v/d)      History of Present Illness:    Lenora Silver is a 39 y.o. female who presents to the emergency department for continued headache, patient with history of migraine being followed by neurology, Dr. Virginia Suarez reports 4-day history of worsening headache. Patient reports that it feels like a band is around her head. Seen at [facility] had a negative CT brain and negative CT cervical spine as well as labs and meds provided without any significant relief  Focused Screening Exam:  Constitutional:  Alert, appears stated age and is in no distress.     *ALLERGIES*     Cymbalta [duloxetine hcl], Diamox [acetazolamide], Doxepin, Duloxetine, Grapeseed extract [nutritional supplements], Levaquin [levofloxacin in d5w], Lipitor [atorvastatin], Other, Pecan nut (diagnostic), Prochlorperazine, Red dye, Reglan [metoclopramide], Rocephin [ceftriaxone], Rosemary oil, Topamax [topiramate], Tramadol, Triptans, [place] [macadamia nut oil], Abilify [aripiprazole], Aspartame and phenylalanine, Cefdinir, Darvon [propoxyphene], Dilaudid [hydromorphone hcl], Doxycycline, Effexor [venlafaxine], Gluten meal, Hydrochlorothiazide, Iron, Meclizine, Milk-related compounds, Sulfa antibiotics, Tetracyclines & related, Wheat bran, Zithromax [azithromycin], Adhesive tape, and Betamethasone     ED Triage Vitals   BP Temp Temp Source Pulse Resp SpO2 Height Weight   07/15/21 0034 07/15/21 0025 07/15/21 0025 07/15/21 0025 07/15/21 0034 07/15/21 0025 07/15/21 0034 07/15/21 0034   (!) 136/90 96.9 °F (36.1 °C) Infrared 82 20 96 % 5' 2\" (1.575 m) 204 lb (92.5 kg)        Initial Plan of Care:  Initiate Treatment-Testing, Proceed toTreatment Area When Bed Available for ED Attending/MLP to Continue Care    -----------------END OF FIRST PROVIDER CONTACT ASSESSMENT NOTE--------------  Electronically signed by REYNOLD Garcia CNP   DD: 7/15/21         REYNOLD Garcia CNP  07/15/21 0036

## 2021-07-15 NOTE — ED NOTES
Bed: 17A-17  Expected date:   Expected time:   Means of arrival:   Comments:  Cherise Caicedo RN  07/15/21 1016

## 2021-07-16 LAB — URINE CULTURE, ROUTINE: NORMAL

## 2021-07-28 ENCOUNTER — APPOINTMENT (OUTPATIENT)
Dept: GENERAL RADIOLOGY | Age: 46
DRG: 047 | End: 2021-07-28
Payer: MEDICAID

## 2021-07-28 ENCOUNTER — APPOINTMENT (OUTPATIENT)
Dept: CT IMAGING | Age: 46
DRG: 047 | End: 2021-07-28
Payer: MEDICAID

## 2021-07-28 ENCOUNTER — HOSPITAL ENCOUNTER (INPATIENT)
Age: 46
LOS: 2 days | Discharge: HOME HEALTH | DRG: 047 | End: 2021-07-30
Attending: EMERGENCY MEDICINE | Admitting: INTERNAL MEDICINE
Payer: MEDICAID

## 2021-07-28 DIAGNOSIS — R29.90 STROKE-LIKE SYMPTOMS: Primary | ICD-10-CM

## 2021-07-28 LAB
ALBUMIN SERPL-MCNC: 3.8 G/DL (ref 3.5–5.2)
ALP BLD-CCNC: 42 U/L (ref 35–104)
ALT SERPL-CCNC: 22 U/L (ref 0–32)
ANION GAP SERPL CALCULATED.3IONS-SCNC: 9 MMOL/L (ref 7–16)
APTT: 39.8 SEC (ref 24.5–35.1)
AST SERPL-CCNC: 19 U/L (ref 0–31)
BACTERIA: NORMAL /HPF
BASOPHILS ABSOLUTE: 0.02 E9/L (ref 0–0.2)
BASOPHILS RELATIVE PERCENT: 0.4 % (ref 0–2)
BILIRUB SERPL-MCNC: 0.4 MG/DL (ref 0–1.2)
BILIRUBIN URINE: NEGATIVE
BLOOD, URINE: NEGATIVE
BUN BLDV-MCNC: 19 MG/DL (ref 6–20)
CALCIUM SERPL-MCNC: 9.2 MG/DL (ref 8.6–10.2)
CHLORIDE BLD-SCNC: 104 MMOL/L (ref 98–107)
CLARITY: CLEAR
CO2: 26 MMOL/L (ref 22–29)
COLOR: YELLOW
CREAT SERPL-MCNC: 0.7 MG/DL (ref 0.5–1)
EKG ATRIAL RATE: 77 BPM
EKG P AXIS: 45 DEGREES
EKG P-R INTERVAL: 150 MS
EKG Q-T INTERVAL: 376 MS
EKG QRS DURATION: 78 MS
EKG QTC CALCULATION (BAZETT): 425 MS
EKG R AXIS: 18 DEGREES
EKG T AXIS: 35 DEGREES
EKG VENTRICULAR RATE: 77 BPM
EOSINOPHILS ABSOLUTE: 0.02 E9/L (ref 0.05–0.5)
EOSINOPHILS RELATIVE PERCENT: 0.4 % (ref 0–6)
GFR AFRICAN AMERICAN: >60
GFR NON-AFRICAN AMERICAN: >60 ML/MIN/1.73
GLUCOSE BLD-MCNC: 186 MG/DL (ref 74–99)
GLUCOSE URINE: NEGATIVE MG/DL
HCG, URINE, POC: NEGATIVE
HCT VFR BLD CALC: 37.3 % (ref 34–48)
HEMOGLOBIN: 12.1 G/DL (ref 11.5–15.5)
IMMATURE GRANULOCYTES #: 0.09 E9/L
IMMATURE GRANULOCYTES %: 1.8 % (ref 0–5)
INR BLD: 1
KETONES, URINE: NEGATIVE MG/DL
LACTIC ACID: 1 MMOL/L (ref 0.5–2.2)
LEUKOCYTE ESTERASE, URINE: NEGATIVE
LIPASE: 54 U/L (ref 13–60)
LYMPHOCYTES ABSOLUTE: 1.96 E9/L (ref 1.5–4)
LYMPHOCYTES RELATIVE PERCENT: 38.7 % (ref 20–42)
Lab: NORMAL
MCH RBC QN AUTO: 30 PG (ref 26–35)
MCHC RBC AUTO-ENTMCNC: 32.4 % (ref 32–34.5)
MCV RBC AUTO: 92.6 FL (ref 80–99.9)
METER GLUCOSE: 155 MG/DL (ref 74–99)
METER GLUCOSE: 89 MG/DL (ref 74–99)
MONOCYTES ABSOLUTE: 0.43 E9/L (ref 0.1–0.95)
MONOCYTES RELATIVE PERCENT: 8.5 % (ref 2–12)
NEGATIVE QC PASS/FAIL: NORMAL
NEUTROPHILS ABSOLUTE: 2.55 E9/L (ref 1.8–7.3)
NEUTROPHILS RELATIVE PERCENT: 50.2 % (ref 43–80)
NITRITE, URINE: NEGATIVE
PDW BLD-RTO: 14.7 FL (ref 11.5–15)
PH UA: 7 (ref 5–9)
PLATELET # BLD: 185 E9/L (ref 130–450)
PMV BLD AUTO: 10.5 FL (ref 7–12)
POSITIVE QC PASS/FAIL: NORMAL
POTASSIUM REFLEX MAGNESIUM: 4.1 MMOL/L (ref 3.5–5)
PROTEIN UA: NEGATIVE MG/DL
PROTHROMBIN TIME: 10.9 SEC (ref 9.3–12.4)
RBC # BLD: 4.03 E12/L (ref 3.5–5.5)
RBC UA: NORMAL /HPF (ref 0–2)
SODIUM BLD-SCNC: 139 MMOL/L (ref 132–146)
SPECIFIC GRAVITY UA: 1.01 (ref 1–1.03)
TOTAL PROTEIN: 7.4 G/DL (ref 6.4–8.3)
TROPONIN, HIGH SENSITIVITY: <6 NG/L (ref 0–9)
UROBILINOGEN, URINE: 0.2 E.U./DL
WBC # BLD: 5.1 E9/L (ref 4.5–11.5)
WBC UA: NORMAL /HPF (ref 0–5)

## 2021-07-28 PROCEDURE — 70496 CT ANGIOGRAPHY HEAD: CPT

## 2021-07-28 PROCEDURE — 85025 COMPLETE CBC W/AUTO DIFF WBC: CPT

## 2021-07-28 PROCEDURE — 6360000002 HC RX W HCPCS: Performed by: STUDENT IN AN ORGANIZED HEALTH CARE EDUCATION/TRAINING PROGRAM

## 2021-07-28 PROCEDURE — 80053 COMPREHEN METABOLIC PANEL: CPT

## 2021-07-28 PROCEDURE — 2580000003 HC RX 258: Performed by: STUDENT IN AN ORGANIZED HEALTH CARE EDUCATION/TRAINING PROGRAM

## 2021-07-28 PROCEDURE — 96372 THER/PROPH/DIAG INJ SC/IM: CPT

## 2021-07-28 PROCEDURE — 6360000002 HC RX W HCPCS: Performed by: INTERNAL MEDICINE

## 2021-07-28 PROCEDURE — 6370000000 HC RX 637 (ALT 250 FOR IP): Performed by: INTERNAL MEDICINE

## 2021-07-28 PROCEDURE — 93010 ELECTROCARDIOGRAM REPORT: CPT | Performed by: INTERNAL MEDICINE

## 2021-07-28 PROCEDURE — 2140000000 HC CCU INTERMEDIATE R&B

## 2021-07-28 PROCEDURE — 83605 ASSAY OF LACTIC ACID: CPT

## 2021-07-28 PROCEDURE — 70450 CT HEAD/BRAIN W/O DYE: CPT

## 2021-07-28 PROCEDURE — 70498 CT ANGIOGRAPHY NECK: CPT

## 2021-07-28 PROCEDURE — 6360000002 HC RX W HCPCS

## 2021-07-28 PROCEDURE — 96374 THER/PROPH/DIAG INJ IV PUSH: CPT

## 2021-07-28 PROCEDURE — G0378 HOSPITAL OBSERVATION PER HR: HCPCS

## 2021-07-28 PROCEDURE — 6360000004 HC RX CONTRAST MEDICATION: Performed by: RADIOLOGY

## 2021-07-28 PROCEDURE — 0042T CT BRAIN PERFUSION: CPT

## 2021-07-28 PROCEDURE — 2580000003 HC RX 258: Performed by: RADIOLOGY

## 2021-07-28 PROCEDURE — 93005 ELECTROCARDIOGRAM TRACING: CPT | Performed by: STUDENT IN AN ORGANIZED HEALTH CARE EDUCATION/TRAINING PROGRAM

## 2021-07-28 PROCEDURE — 83690 ASSAY OF LIPASE: CPT

## 2021-07-28 PROCEDURE — 99283 EMERGENCY DEPT VISIT LOW MDM: CPT

## 2021-07-28 PROCEDURE — 85610 PROTHROMBIN TIME: CPT

## 2021-07-28 PROCEDURE — 96376 TX/PRO/DX INJ SAME DRUG ADON: CPT

## 2021-07-28 PROCEDURE — 2580000003 HC RX 258: Performed by: INTERNAL MEDICINE

## 2021-07-28 PROCEDURE — 84484 ASSAY OF TROPONIN QUANT: CPT

## 2021-07-28 PROCEDURE — 71045 X-RAY EXAM CHEST 1 VIEW: CPT

## 2021-07-28 PROCEDURE — 82962 GLUCOSE BLOOD TEST: CPT

## 2021-07-28 PROCEDURE — 81001 URINALYSIS AUTO W/SCOPE: CPT

## 2021-07-28 PROCEDURE — 94640 AIRWAY INHALATION TREATMENT: CPT

## 2021-07-28 PROCEDURE — 94664 DEMO&/EVAL PT USE INHALER: CPT

## 2021-07-28 PROCEDURE — 85730 THROMBOPLASTIN TIME PARTIAL: CPT

## 2021-07-28 PROCEDURE — 96375 TX/PRO/DX INJ NEW DRUG ADDON: CPT

## 2021-07-28 PROCEDURE — 99223 1ST HOSP IP/OBS HIGH 75: CPT | Performed by: INTERNAL MEDICINE

## 2021-07-28 RX ORDER — KETOROLAC TROMETHAMINE 30 MG/ML
15 INJECTION, SOLUTION INTRAMUSCULAR; INTRAVENOUS ONCE
Status: COMPLETED | OUTPATIENT
Start: 2021-07-28 | End: 2021-07-28

## 2021-07-28 RX ORDER — IBUPROFEN 400 MG/1
400 TABLET ORAL EVERY 6 HOURS PRN
Status: DISCONTINUED | OUTPATIENT
Start: 2021-07-28 | End: 2021-07-30 | Stop reason: HOSPADM

## 2021-07-28 RX ORDER — FOLIC ACID 1 MG/1
1 TABLET ORAL
Status: DISCONTINUED | OUTPATIENT
Start: 2021-07-28 | End: 2021-07-30 | Stop reason: HOSPADM

## 2021-07-28 RX ORDER — AMPICILLIN TRIHYDRATE 250 MG
600 CAPSULE ORAL 2 TIMES DAILY
COMMUNITY

## 2021-07-28 RX ORDER — ACETAMINOPHEN 650 MG/1
650 SUPPOSITORY RECTAL EVERY 6 HOURS PRN
Status: DISCONTINUED | OUTPATIENT
Start: 2021-07-28 | End: 2021-07-28

## 2021-07-28 RX ORDER — ROPINIROLE 1 MG/1
1 TABLET, FILM COATED ORAL NIGHTLY
Status: DISCONTINUED | OUTPATIENT
Start: 2021-07-28 | End: 2021-07-30 | Stop reason: HOSPADM

## 2021-07-28 RX ORDER — PROMETHAZINE HYDROCHLORIDE 25 MG/1
12.5 TABLET ORAL EVERY 6 HOURS PRN
Status: DISCONTINUED | OUTPATIENT
Start: 2021-07-28 | End: 2021-07-30 | Stop reason: HOSPADM

## 2021-07-28 RX ORDER — SODIUM CHLORIDE 0.9 % (FLUSH) 0.9 %
10 SYRINGE (ML) INJECTION PRN
Status: DISCONTINUED | OUTPATIENT
Start: 2021-07-28 | End: 2021-07-30 | Stop reason: HOSPADM

## 2021-07-28 RX ORDER — ONDANSETRON 2 MG/ML
4 INJECTION INTRAMUSCULAR; INTRAVENOUS EVERY 6 HOURS PRN
Status: DISCONTINUED | OUTPATIENT
Start: 2021-07-28 | End: 2021-07-30 | Stop reason: HOSPADM

## 2021-07-28 RX ORDER — SODIUM CHLORIDE 0.9 % (FLUSH) 0.9 %
10 SYRINGE (ML) INJECTION PRN
Status: DISCONTINUED | OUTPATIENT
Start: 2021-07-28 | End: 2021-07-28

## 2021-07-28 RX ORDER — LEVOTHYROXINE SODIUM 0.12 MG/1
125 TABLET ORAL
Status: DISCONTINUED | OUTPATIENT
Start: 2021-08-02 | End: 2021-07-30 | Stop reason: SDUPTHER

## 2021-07-28 RX ORDER — ARFORMOTEROL TARTRATE 15 UG/2ML
15 SOLUTION RESPIRATORY (INHALATION) 2 TIMES DAILY
Status: DISCONTINUED | OUTPATIENT
Start: 2021-07-28 | End: 2021-07-30 | Stop reason: HOSPADM

## 2021-07-28 RX ORDER — DIPHENHYDRAMINE HCL 12.5MG/5ML
25 LIQUID (ML) ORAL 4 TIMES DAILY PRN
Status: DISCONTINUED | OUTPATIENT
Start: 2021-07-28 | End: 2021-07-30 | Stop reason: HOSPADM

## 2021-07-28 RX ORDER — FUROSEMIDE 20 MG/1
20 TABLET ORAL DAILY
Status: DISCONTINUED | OUTPATIENT
Start: 2021-07-28 | End: 2021-07-30 | Stop reason: HOSPADM

## 2021-07-28 RX ORDER — INSULIN GLARGINE 100 [IU]/ML
4 INJECTION, SOLUTION SUBCUTANEOUS
Status: DISCONTINUED | OUTPATIENT
Start: 2021-07-29 | End: 2021-07-30 | Stop reason: HOSPADM

## 2021-07-28 RX ORDER — SODIUM CHLORIDE 9 MG/ML
25 INJECTION, SOLUTION INTRAVENOUS PRN
Status: DISCONTINUED | OUTPATIENT
Start: 2021-07-28 | End: 2021-07-28

## 2021-07-28 RX ORDER — ROPINIROLE 0.5 MG/1
0.5 TABLET, FILM COATED ORAL DAILY
Status: DISCONTINUED | OUTPATIENT
Start: 2021-07-29 | End: 2021-07-30 | Stop reason: HOSPADM

## 2021-07-28 RX ORDER — PROMETHAZINE HYDROCHLORIDE 25 MG/1
25 TABLET ORAL EVERY 6 HOURS PRN
Status: DISCONTINUED | OUTPATIENT
Start: 2021-07-28 | End: 2021-07-30 | Stop reason: HOSPADM

## 2021-07-28 RX ORDER — SODIUM CHLORIDE 9 MG/ML
25 INJECTION, SOLUTION INTRAVENOUS PRN
Status: DISCONTINUED | OUTPATIENT
Start: 2021-07-28 | End: 2021-07-30 | Stop reason: HOSPADM

## 2021-07-28 RX ORDER — IPRATROPIUM BROMIDE AND ALBUTEROL SULFATE 2.5; .5 MG/3ML; MG/3ML
1 SOLUTION RESPIRATORY (INHALATION) EVERY 4 HOURS PRN
Status: DISCONTINUED | OUTPATIENT
Start: 2021-07-28 | End: 2021-07-30 | Stop reason: HOSPADM

## 2021-07-28 RX ORDER — ACETAMINOPHEN 325 MG/1
650 TABLET ORAL EVERY 6 HOURS PRN
Status: DISCONTINUED | OUTPATIENT
Start: 2021-07-28 | End: 2021-07-28

## 2021-07-28 RX ORDER — SODIUM CHLORIDE 0.9 % (FLUSH) 0.9 %
10 SYRINGE (ML) INJECTION EVERY 12 HOURS SCHEDULED
Status: DISCONTINUED | OUTPATIENT
Start: 2021-07-28 | End: 2021-07-28

## 2021-07-28 RX ORDER — SODIUM CHLORIDE 0.9 % (FLUSH) 0.9 %
10 SYRINGE (ML) INJECTION EVERY 12 HOURS SCHEDULED
Status: DISCONTINUED | OUTPATIENT
Start: 2021-07-28 | End: 2021-07-30 | Stop reason: HOSPADM

## 2021-07-28 RX ORDER — DIPHENHYDRAMINE HYDROCHLORIDE 50 MG/ML
25 INJECTION INTRAMUSCULAR; INTRAVENOUS ONCE
Status: COMPLETED | OUTPATIENT
Start: 2021-07-28 | End: 2021-07-28

## 2021-07-28 RX ORDER — POLYETHYLENE GLYCOL 3350 17 G/17G
17 POWDER, FOR SOLUTION ORAL DAILY PRN
Status: DISCONTINUED | OUTPATIENT
Start: 2021-07-28 | End: 2021-07-28

## 2021-07-28 RX ORDER — PROPRANOLOL HYDROCHLORIDE 10 MG/1
10 TABLET ORAL 3 TIMES DAILY
Status: DISCONTINUED | OUTPATIENT
Start: 2021-07-28 | End: 2021-07-30 | Stop reason: HOSPADM

## 2021-07-28 RX ORDER — ACETAMINOPHEN 325 MG/1
650 TABLET ORAL EVERY 6 HOURS PRN
Status: DISCONTINUED | OUTPATIENT
Start: 2021-07-28 | End: 2021-07-30 | Stop reason: HOSPADM

## 2021-07-28 RX ORDER — BUDESONIDE 0.5 MG/2ML
1 INHALANT ORAL 2 TIMES DAILY
Status: DISCONTINUED | OUTPATIENT
Start: 2021-07-28 | End: 2021-07-30 | Stop reason: HOSPADM

## 2021-07-28 RX ORDER — ACETAMINOPHEN 650 MG/1
650 SUPPOSITORY RECTAL EVERY 6 HOURS PRN
Status: DISCONTINUED | OUTPATIENT
Start: 2021-07-28 | End: 2021-07-30 | Stop reason: HOSPADM

## 2021-07-28 RX ORDER — INSULIN GLARGINE 100 [IU]/ML
7 INJECTION, SOLUTION SUBCUTANEOUS NIGHTLY
Status: DISCONTINUED | OUTPATIENT
Start: 2021-07-28 | End: 2021-07-30 | Stop reason: HOSPADM

## 2021-07-28 RX ORDER — PANTOPRAZOLE SODIUM 40 MG/1
40 TABLET, DELAYED RELEASE ORAL
Status: DISCONTINUED | OUTPATIENT
Start: 2021-07-29 | End: 2021-07-30 | Stop reason: HOSPADM

## 2021-07-28 RX ORDER — SODIUM CHLORIDE 0.9 % (FLUSH) 0.9 %
10 SYRINGE (ML) INJECTION ONCE
Status: COMPLETED | OUTPATIENT
Start: 2021-07-28 | End: 2021-07-28

## 2021-07-28 RX ORDER — ONDANSETRON 2 MG/ML
4 INJECTION INTRAMUSCULAR; INTRAVENOUS ONCE
Status: COMPLETED | OUTPATIENT
Start: 2021-07-28 | End: 2021-07-28

## 2021-07-28 RX ORDER — DEXTROSE MONOHYDRATE 25 G/50ML
12.5 INJECTION, SOLUTION INTRAVENOUS PRN
Status: DISCONTINUED | OUTPATIENT
Start: 2021-07-28 | End: 2021-07-30 | Stop reason: HOSPADM

## 2021-07-28 RX ORDER — POLYETHYLENE GLYCOL 3350 17 G/17G
17 POWDER, FOR SOLUTION ORAL DAILY PRN
Status: DISCONTINUED | OUTPATIENT
Start: 2021-07-28 | End: 2021-07-30 | Stop reason: HOSPADM

## 2021-07-28 RX ORDER — DIVALPROEX SODIUM 500 MG/1
500 TABLET, EXTENDED RELEASE ORAL 3 TIMES DAILY
Status: DISCONTINUED | OUTPATIENT
Start: 2021-07-28 | End: 2021-07-30 | Stop reason: HOSPADM

## 2021-07-28 RX ORDER — 0.9 % SODIUM CHLORIDE 0.9 %
1000 INTRAVENOUS SOLUTION INTRAVENOUS ONCE
Status: COMPLETED | OUTPATIENT
Start: 2021-07-28 | End: 2021-07-28

## 2021-07-28 RX ORDER — LEVOTHYROXINE SODIUM 0.12 MG/1
125 TABLET ORAL
Status: DISCONTINUED | OUTPATIENT
Start: 2021-07-29 | End: 2021-07-30 | Stop reason: SDUPTHER

## 2021-07-28 RX ORDER — NICOTINE POLACRILEX 4 MG
15 LOZENGE BUCCAL PRN
Status: DISCONTINUED | OUTPATIENT
Start: 2021-07-28 | End: 2021-07-30 | Stop reason: HOSPADM

## 2021-07-28 RX ORDER — MONTELUKAST SODIUM 10 MG/1
10 TABLET ORAL NIGHTLY
Status: DISCONTINUED | OUTPATIENT
Start: 2021-07-28 | End: 2021-07-30 | Stop reason: HOSPADM

## 2021-07-28 RX ORDER — DEXTROSE MONOHYDRATE 50 MG/ML
100 INJECTION, SOLUTION INTRAVENOUS PRN
Status: DISCONTINUED | OUTPATIENT
Start: 2021-07-28 | End: 2021-07-30 | Stop reason: HOSPADM

## 2021-07-28 RX ADMIN — MONTELUKAST SODIUM 10 MG: 10 TABLET ORAL at 20:30

## 2021-07-28 RX ADMIN — DIVALPROEX SODIUM 500 MG: 500 TABLET, EXTENDED RELEASE ORAL at 20:29

## 2021-07-28 RX ADMIN — Medication 10 ML: at 20:30

## 2021-07-28 RX ADMIN — IBUPROFEN 400 MG: 400 TABLET, FILM COATED ORAL at 22:17

## 2021-07-28 RX ADMIN — SODIUM CHLORIDE 1000 ML: 9 INJECTION, SOLUTION INTRAVENOUS at 07:55

## 2021-07-28 RX ADMIN — DIPHENHYDRAMINE HYDROCHLORIDE 25 MG: 50 INJECTION, SOLUTION INTRAMUSCULAR; INTRAVENOUS at 11:38

## 2021-07-28 RX ADMIN — ENOXAPARIN SODIUM 40 MG: 100 INJECTION SUBCUTANEOUS at 18:10

## 2021-07-28 RX ADMIN — ARFORMOTEROL TARTRATE 15 MCG: 15 SOLUTION RESPIRATORY (INHALATION) at 21:24

## 2021-07-28 RX ADMIN — BUDESONIDE 1000 MCG: 0.5 SUSPENSION RESPIRATORY (INHALATION) at 21:24

## 2021-07-28 RX ADMIN — FOLIC ACID 1 MG: 1 TABLET ORAL at 18:10

## 2021-07-28 RX ADMIN — PROPRANOLOL HYDROCHLORIDE 10 MG: 10 TABLET ORAL at 20:30

## 2021-07-28 RX ADMIN — ONDANSETRON 4 MG: 2 INJECTION INTRAMUSCULAR; INTRAVENOUS at 07:57

## 2021-07-28 RX ADMIN — IOPAMIDOL 100 ML: 755 INJECTION, SOLUTION INTRAVENOUS at 09:42

## 2021-07-28 RX ADMIN — ROPINIROLE HYDROCHLORIDE 1 MG: 1 TABLET, FILM COATED ORAL at 20:29

## 2021-07-28 RX ADMIN — PROMETHAZINE HYDROCHLORIDE 25 MG: 25 TABLET ORAL at 23:33

## 2021-07-28 RX ADMIN — KETOROLAC TROMETHAMINE 15 MG: 30 INJECTION, SOLUTION INTRAMUSCULAR at 11:39

## 2021-07-28 RX ADMIN — FUROSEMIDE 20 MG: 20 TABLET ORAL at 18:10

## 2021-07-28 RX ADMIN — Medication 400 MG: at 20:29

## 2021-07-28 RX ADMIN — Medication 10 ML: at 09:42

## 2021-07-28 RX ADMIN — ACETAMINOPHEN 650 MG: 325 TABLET ORAL at 18:10

## 2021-07-28 RX ADMIN — ONDANSETRON 4 MG: 2 INJECTION INTRAMUSCULAR; INTRAVENOUS at 22:17

## 2021-07-28 RX ADMIN — INSULIN LISPRO 1 UNITS: 100 INJECTION, SOLUTION INTRAVENOUS; SUBCUTANEOUS at 20:28

## 2021-07-28 RX ADMIN — INSULIN GLARGINE 7 UNITS: 100 INJECTION, SOLUTION SUBCUTANEOUS at 20:29

## 2021-07-28 RX ADMIN — KETOROLAC TROMETHAMINE 15 MG: 30 INJECTION, SOLUTION INTRAMUSCULAR; INTRAVENOUS at 23:33

## 2021-07-28 SDOH — SOCIAL STABILITY: SOCIAL NETWORK: ARE YOU MARRIED, WIDOWED, DIVORCED, SEPARATED, NEVER MARRIED, OR LIVING WITH A PARTNER?: MARRIED

## 2021-07-28 SDOH — HEALTH STABILITY: MENTAL HEALTH: HOW OFTEN DO YOU HAVE A DRINK CONTAINING ALCOHOL?: NEVER

## 2021-07-28 SDOH — HEALTH STABILITY: MENTAL HEALTH: HOW MANY STANDARD DRINKS CONTAINING ALCOHOL DO YOU HAVE ON A TYPICAL DAY?: PATIENT DECLINED

## 2021-07-28 SDOH — SOCIAL STABILITY: SOCIAL INSECURITY: WITHIN THE LAST YEAR, HAVE YOU BEEN HUMILIATED OR EMOTIONALLY ABUSED IN OTHER WAYS BY YOUR PARTNER OR EX-PARTNER?: NO

## 2021-07-28 SDOH — ECONOMIC STABILITY: FOOD INSECURITY: WITHIN THE PAST 12 MONTHS, YOU WORRIED THAT YOUR FOOD WOULD RUN OUT BEFORE YOU GOT MONEY TO BUY MORE.: NEVER TRUE

## 2021-07-28 SDOH — SOCIAL STABILITY: SOCIAL NETWORK: HOW OFTEN DO YOU GET TOGETHER WITH FRIENDS OR RELATIVES?: TWICE A WEEK

## 2021-07-28 SDOH — ECONOMIC STABILITY: FOOD INSECURITY: WITHIN THE PAST 12 MONTHS, THE FOOD YOU BOUGHT JUST DIDN'T LAST AND YOU DIDN'T HAVE MONEY TO GET MORE.: NEVER TRUE

## 2021-07-28 SDOH — ECONOMIC STABILITY: INCOME INSECURITY: IN THE LAST 12 MONTHS, WAS THERE A TIME WHEN YOU WERE NOT ABLE TO PAY THE MORTGAGE OR RENT ON TIME?: NO

## 2021-07-28 SDOH — ECONOMIC STABILITY: HOUSING INSECURITY: IN THE LAST 12 MONTHS, HOW MANY PLACES HAVE YOU LIVED?: 1

## 2021-07-28 SDOH — ECONOMIC STABILITY: INCOME INSECURITY: HOW HARD IS IT FOR YOU TO PAY FOR THE VERY BASICS LIKE FOOD, HOUSING, MEDICAL CARE, AND HEATING?: NOT HARD AT ALL

## 2021-07-28 SDOH — SOCIAL STABILITY: SOCIAL INSECURITY
WITHIN THE LAST YEAR, HAVE TO BEEN RAPED OR FORCED TO HAVE ANY KIND OF SEXUAL ACTIVITY BY YOUR PARTNER OR EX-PARTNER?: NO

## 2021-07-28 SDOH — SOCIAL STABILITY: SOCIAL NETWORK: HOW OFTEN DO YOU ATTENT MEETINGS OF THE CLUB OR ORGANIZATION YOU BELONG TO?: NEVER

## 2021-07-28 SDOH — SOCIAL STABILITY: SOCIAL NETWORK: IN A TYPICAL WEEK, HOW MANY TIMES DO YOU TALK ON THE PHONE WITH FAMILY, FRIENDS, OR NEIGHBORS?: THREE TIMES A WEEK

## 2021-07-28 SDOH — SOCIAL STABILITY: SOCIAL NETWORK: HOW OFTEN DO YOU ATTEND CHURCH OR RELIGIOUS SERVICES?: 1 TO 4 TIMES PER YEAR

## 2021-07-28 SDOH — HEALTH STABILITY: PHYSICAL HEALTH: ON AVERAGE, HOW MANY MINUTES DO YOU ENGAGE IN EXERCISE AT THIS LEVEL?: 0 MIN

## 2021-07-28 SDOH — ECONOMIC STABILITY: HOUSING INSECURITY
IN THE LAST 12 MONTHS, WAS THERE A TIME WHEN YOU DID NOT HAVE A STEADY PLACE TO SLEEP OR SLEPT IN A SHELTER (INCLUDING NOW)?: NO

## 2021-07-28 SDOH — SOCIAL STABILITY: SOCIAL INSECURITY
WITHIN THE LAST YEAR, HAVE YOU BEEN KICKED, HIT, SLAPPED, OR OTHERWISE PHYSICALLY HURT BY YOUR PARTNER OR EX-PARTNER?: NO

## 2021-07-28 SDOH — HEALTH STABILITY: PHYSICAL HEALTH: ON AVERAGE, HOW MANY DAYS PER WEEK DO YOU ENGAGE IN MODERATE TO STRENUOUS EXERCISE (LIKE A BRISK WALK)?: 0 DAYS

## 2021-07-28 SDOH — SOCIAL STABILITY: SOCIAL INSECURITY: WITHIN THE LAST YEAR, HAVE YOU BEEN AFRAID OF YOUR PARTNER OR EX-PARTNER?: NO

## 2021-07-28 SDOH — ECONOMIC STABILITY: TRANSPORTATION INSECURITY
IN THE PAST 12 MONTHS, HAS THE LACK OF TRANSPORTATION KEPT YOU FROM MEDICAL APPOINTMENTS OR FROM GETTING MEDICATIONS?: NO

## 2021-07-28 ASSESSMENT — PAIN DESCRIPTION - PAIN TYPE
TYPE: CHRONIC PAIN
TYPE: ACUTE PAIN
TYPE: ACUTE PAIN

## 2021-07-28 ASSESSMENT — PAIN SCALES - GENERAL
PAINLEVEL_OUTOF10: 10
PAINLEVEL_OUTOF10: 8
PAINLEVEL_OUTOF10: 10
PAINLEVEL_OUTOF10: 7
PAINLEVEL_OUTOF10: 6
PAINLEVEL_OUTOF10: 7

## 2021-07-28 ASSESSMENT — PAIN DESCRIPTION - FREQUENCY
FREQUENCY: CONTINUOUS
FREQUENCY: CONTINUOUS

## 2021-07-28 ASSESSMENT — ENCOUNTER SYMPTOMS
NAUSEA: 0
SHORTNESS OF BREATH: 0
COUGH: 0

## 2021-07-28 ASSESSMENT — PAIN DESCRIPTION - DESCRIPTORS
DESCRIPTORS: HEADACHE
DESCRIPTORS: HEADACHE

## 2021-07-28 ASSESSMENT — PAIN DESCRIPTION - ONSET: ONSET: ON-GOING

## 2021-07-28 ASSESSMENT — PAIN DESCRIPTION - LOCATION
LOCATION: HEAD

## 2021-07-28 NOTE — ED PROVIDER NOTES
ATTENDING PROVIDER ATTESTATION:     Carolann Sylvester presented to the emergency department for evaluation of Fatigue (states she was just discharged last thursday from hospital and has had increasing weakness), Dizziness, and Headache (\"burning sensation around my head\")   and was initially evaluated by the Medical Resident. See Original ED Note for H&P and ED course above. I have reviewed and discussed the case, including pertinent history (medical, surgical, family and social) and exam findings with the Medical Resident assigned to Carolann Sylvester. I have personally performed and/or participated in the history, exam, medical decision making, and procedures and agree with all pertinent clinical information and any additional changes or corrections are noted below in my assessment and plan. I have discussed this patient in detail with the resident, and provided the instruction and education,       I have reviewed my findings and recommendations with the assigned Medical Resident, Carolann Sylvester and members of family present at the time of disposition. I have performed a history and physical examination of this patient and directly supervised the resident caring for this patient      History of Present Illness:    Presents to the ED for headache, dizziness, right sided weakness, beginning prior to arrival.  The complaint has been constant, severe in severity, and worsened by nothing. Last know well 10pm. Awoke with dizziness as well as right-sided weakness as well as headache. She reports burning sensation around her head. Not sudden onset, not worst headache of life. She reports that she was recently admitted to [facility] and had MRI with questionable medulla lesion. She has history of multiple neurologic conditions and follows with neurology here.         Review of Systems:   A complete review of systems was performed and pertinent positives and negatives are stated within HPI, all other systems Prochlorperazine, Red dye, Reglan [metoclopramide], Rocephin [ceftriaxone], Rosemary oil, Topamax [topiramate], Tramadol, Triptans, [place] [macadamia nut oil], Abilify [aripiprazole], Aspartame and phenylalanine, Cefdinir, Darvon [propoxyphene], Dilaudid [hydromorphone hcl], Doxycycline, Effexor [venlafaxine], Gluten meal, Hydrochlorothiazide, Iron, Meclizine, Milk-related compounds, Sulfa antibiotics, Tetracyclines & related, Wheat bran, Zithromax [azithromycin], Adhesive tape, and Betamethasone      Physical Exam:  Constitutional/General: Alert and oriented x3  Head: Normocephalic and atraumatic  Eyes: PERRL, EOMI, sclera non icteric  ENT: Oropharynx clear, handling secretions  Neck: Supple, full ROM, no stridor, no meningeal signs  Respiratory: Lungs clear to auscultation bilaterally, no wheezes, rales, or rhonchi. Not in respiratory distress  Cardiovascular:  Regular rate. Regular rhythm. No murmurs, no gallops, no rubs. 2+ distal pulses. Equal extremity pulses. GI:  Abdomen Soft, Non tender, Non distended. No rebound, guarding, or rigidity. No pulsatile masses. Musculoskeletal: Moves all extremities x 4. Warm and well perfused,  no clubbing, no cyanosis, no edema. Palpable peripheral pulses  Integument: skin warm and dry. No rashes. Neurologic: GCS 15, right upper and lower extremity weakness compared to the left. Able to squeeze hands and wiggle toes. Able to lift against gravity but cannot maintain. I directly supervised any procedures performed by the resident and was present for the procedure including all critical portions of the procedure      The cardiac monitor revealed sinus rhythm with a heart rate in the 80 s as interpreted by me. The cardiac monitor was ordered secondary to the patient's strokelike symptoms and to monitor the patient for dysrhythmia.    CPT [id number]      I, Dr. Charmayne Roles, am the primary provider of record    My Medical Decision Making:          stoke like symptoms  Not a TPA candidate  ? MS related  Medicine consulted for admission        1.  Stroke-like symptoms           Larry Prajapati MD  07/28/21 9718

## 2021-07-28 NOTE — H&P
[facility]    Attending Physician Statement:  Austin Obando M.D., F.A.C.P. I have discussed the case, including pertinent history and exam findings with the resident/NP. I have seen and examined the patient and the key elements of the encounter have been performed by me. I agree with the resident ROS, PMHx, PSHx, meds reviewed and assessment, plan and orders as documented by the resident/NP      Hospital charts reviewed, including other providers notes, relevant labs and imaging. Hx of migraines  ? complex   Zanaflex, magnesium, verapamil, propranolol, Depakote and Emgality  Should be on ASA- prevent CVA  Rule out TIA    Workup for MS- done in past +oligoclonal, neg NMO  Neg MRI in past    >50% of time spent coordinating care with other providers and/or counseling patient/family  Remainder of medical problems as per resident note.

## 2021-07-28 NOTE — H&P
[facility]  Internal Medicine Residency Program  History and Physical    Patient:  Randy Wolf 39 y.o. female MRN: [id number]     Date of Service: 7/28/2021    Hospital Day: 1      Chief complaint: had concerns including Fatigue (states she was just discharged last thursday from hospital and has had increasing weakness), Dizziness, and Headache (\"burning sensation around my head\"). History of Present Illness   The patient is a 39 y.o. female with a past medical history of diabetes, she groans syndrome, myalgias, hypogammaglobinemia, complex migraines with hemiplegia, Raynaud's phenomenon, TIA, partial blindness, POTS, polycystic ovarian disease, GERD, and possible connective tissue disorder presents to the emergency department due to dizziness, headaches, and extreme weakness on the right side. She says she went to bed yesterday about 10 PM where she was last feeling normal and woke up around 4 AM this morning when she was feeling dizzy, had a headache, and the right arm and leg tended to be weak. She states that this is happened before and follows currently with neurology locally. She states that these headaches that she is experiencing today are different than headache she has had previously. She states that she has ambulatory normally with no focal weakness in the right side however today she is unable to ambulate due to weakness on the right side. States she is on various medications for headaches however none of them seem to be working at the current moment. She also notes that she is blind in the left eye. She states she was recently at [facility] for similar complaints. She stated that she had an MRI performed with a questionable Medela lesion. Denies any chest pain, shortness of breath, vomiting, abdominal pain. Notable labs/imaging in the ED: Admission the ED patient had a blood pressure 146/101. UA was negative. CXR showed no evidence of acute abnormality.   Patient's blood glucose was 186. Troponin is less than 6. CT head, CTA head, CT neck, CTA neck showed no acute intracranial abnormality with no perfusion mismatch no acute abnormality or flow-limiting stenosis of the major arteries of the head neck    Medications/fluids in ED: Patient was given a 1 L bolus of normal saline, 25 mg of Benadryl, 10 mg Toradol, 4 mg Zofran    Past Medical History:      Diagnosis Date    Abnormal Pap smear of cervix     hpv age 23   [place] Me Anemia     Anxiety     Asthma     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     Connective tissue disease (HCC)     Cyst of right breast     DDD (degenerative disc disease), cervical     Depression     Diabetes mellitus ([address]     Dysphagia     Esophagus disorder     needs stretched ocassionally    Fibromyalgia     Gastroparesis     GERD (gastroesophageal reflux disease)     Headache     Hematuria     Hyperlipidemia     Hypertension     Hypothyroidism     IBS (irritable bowel syndrome)     with constipation and diarrhea    Immune deficiency disorder (HCC)     Insomnia     Kidney stones     Meningitis     x4--- twice after IVIG     Meningitis     PAULINE on CPAP     cpap dependence    PCOS (polycystic ovarian syndrome)     POTS (postural orthostatic tachycardia syndrome)     Problems with swallowing     due restrictive esophagus    Pseudotumor cerebri     in 2008--- treated with LP; allergy to diamox    Raynaud's disease     Rectal bleeding     RLS (restless legs syndrome)     Scleroderma (HCC)     Sjogren's disease ([address]     Thyroid nodule     TIA (transient ischemic attack)        Past Surgical History:        Procedure Laterality Date    APPENDECTOMY      CARPAL TUNNEL RELEASE Bilateral     CHOLECYSTECTOMY      HYSTERECTOMY      PORT SURGERY      RIGHT chest  3 so far    PYLOROPLASTY         Medications Prior to Admission:    Prior to Admission medications    Medication Sig Start Date End Date Taking?  Authorizing Provider   senna (SENOKOT) 8.6 MG tablet Take 8.6 mg by mouth 2 times daily as needed 4/26/21   Historical Provider, MD   cimetidine (TAGAMET) 300 MG tablet Take 300 mg by mouth 3 times daily    Historical Provider, MD   methylPREDNISolone (MEDROL) 4 MG tablet Take 2 mg by mouth daily    Historical Provider, MD   levothyroxine (SYNTHROID) 50 MCG tablet Take 2.5 tablets by mouth Five times weekly Given Monday,Tuesday,Wednesday,Thursday,Friday 2/8/21   Vicenta Wallace,    CPAP Machine MISC by Does not apply route nightly. 5-10cm. Historical Provider, MD   Immune Globulin, Human, (GAMMAGARD IV) Infuse intravenously 40 gm per port once a month    Historical Provider, MD   propranolol (INDERAL) 10 MG tablet TAKE 1 TABLET BY MOUTH THREE TIMES DAILY 1/25/21   Vicenta Wallace,    ascorbic acid (VITAMIN C) 500 MG tablet TAKE 1 TABLET BY MOUTH EVERY DAY 1/2/21   Historical Provider, MD   divalproex (DEPAKOTE ER) 500 MG extended release tablet TAKE 1 TABLET BY MOUTH THREE TIMES DAILY 12/24/20   Historical Provider, MD   zinc 50 MG CAPS Take 50 mg by mouth daily 1/2/21   Mirna Goodell, APRN - CNP   polyethylene glycol (GLYCOLAX) 17 g packet Take 17 g by mouth 2 times daily as needed for Constipation 12/28/20   Vicenta Wallace,    Polyvinyl Alcohol-Povidone (REFRESH OP) Apply to eye Artificial tears    Historical Provider, MD   insulin glargine (LANTUS SOLOSTAR) 100 UNIT/ML injection pen Injects 8 units in the morning and 14 units at night 11/10/20   Historical Provider, MD   ketorolac (TORADOL) 10 MG tablet Take 1 tablet by mouth every 6 hours as needed for Pain 10/19/20   Sundsanya Arrant,    Magnesium 500 MG TABS Take 1 tablet by mouth 2 times daily    Historical Provider, MD   insulin lispro, 1 Unit Dial, (HUMALOG KWIKPEN) 100 UNIT/ML SOPN Inject into the skin daily Sliding scale: 150-200- 4 units, then add 2 units for every 50 up to a max of 12 units.     Historical Provider, MD   Probiotic Product (PROBIOTIC DAILY PO) daily Historical Provider, MD   furosemide (LASIX) 20 MG tablet Take 1 tablet by mouth daily 10/5/20   Vicenta Wallace DO   aspirin 81 MG EC tablet Take 81 mg by mouth daily     Historical Provider, MD   rOPINIRole (REQUIP) 0.5 MG tablet Take 1 tablet by mouth See Admin Instructions 1 tablet - breakfast  2 tablets - supper  Patient taking differently: Take 0.5 mg by mouth See Admin Instructions 1 tablet qAM and 2 tablets qPM 9/1/20 6/29/21  Vicenta Wallace DO   pantoprazole (PROTONIX) 40 MG tablet Take 1 tablet by mouth every morning (before breakfast)  Patient taking differently: Take 40 mg by mouth 2 times daily  9/1/20   Vicenta Wallace DO   folic acid (FOLVITE) 1 MG tablet Take 1 tablet by mouth Daily with lunch 9/1/20   Vicenta Wallace DO   acetaminophen (TYLENOL) 325 MG tablet Take 650 mg by mouth every 6 hours as needed for Pain    Historical Provider, MD   sodium chloride (OCEAN, BABY AYR) 0.65 % nasal spray 1 spray by Nasal route 4 times daily as needed for Congestion    Historical Provider, MD   azelastine (ASTELIN) 0.1 % nasal spray 1 spray by Nasal route 2 times daily    Historical Provider, MD   diphenhydrAMINE (BENYLIN) 12.5 MG/5ML liquid Take 25 mg by mouth 4 times daily as needed for Allergies (migrane break through)    Historical Provider, MD   Artificial Saliva (BIOTENE DRY MOUTH MOISTURIZING MT) Take 1 spray by mouth every 3 hours    Historical Provider, MD   OIL OF OREGANO PO Take 60 mg by mouth daily (with breakfast)     Historical Provider, MD   miconazole (MICOTIN) 2 % powder Apply topically daily Apply to groin, under breast and skin folds.     Historical Provider, MD   magnesium hydroxide (MILK OF MAGNESIA) 400 MG/5ML suspension Take 30 mLs by mouth 4 times daily as needed for Constipation    Historical Provider, MD   SENNA LEAVES PO Take 470 mg by mouth three times daily     Historical Provider, MD   promethazine (PHENERGAN) 25 MG tablet Take 25 mg by mouth every 6 hours as needed for Nausea Historical Provider, MD   triamcinolone (KENALOG) 0.1 % cream Apply topically daily as needed (rash)     Historical Provider, MD   NONFORMULARY Take 2 capsules by mouth 2 times daily (with meals) Turmeric Ginger    Historical Provider, MD   potassium chloride (KLOR-CON) 20 MEQ packet Take 20 mEq by mouth daily 8/19/20 6/29/21  Vicenta Wallace DO   levomilnacipran (FETZIMA) 40 MG CP24 extended release capsule Take 20 mg by mouth daily     Historical Provider, MD   clonazePAM (KLONOPIN) 0.5 MG tablet Take 0.5 mg by mouth 2 times daily. Solo Hernandez     Historical Provider, MD   econazole nitrate 1 % cream Apply topically daily as needed (Apply under breasts and folds)     Historical Provider, MD   fluticasone (FLONASE) 50 MCG/ACT nasal spray 1 spray by Nasal route 2 times daily     Historical Provider, MD   fluticasone-salmeterol (ADVAIR HFA) 230-21 MCG/ACT inhaler Inhale 2 puffs into the lungs 2 times daily    Historical Provider, MD   ipratropium-albuterol (DUONEB) 0.5-2.5 (3) MG/3ML SOLN nebulizer solution Inhale 1 vial into the lungs every 4 hours as needed for Shortness of Breath     Historical Provider, MD   montelukast (SINGULAIR) 10 MG tablet Take 10 mg by mouth nightly    Historical Provider, MD   ondansetron (ZOFRAN-ODT) 8 MG TBDP disintegrating tablet Take 8 mg by mouth every 8 hours as needed for Nausea or Vomiting     Historical Provider, MD       Allergies:  Cymbalta [duloxetine hcl], Diamox [acetazolamide], Doxepin, Duloxetine, Grapeseed extract [nutritional supplements], Levaquin [levofloxacin in d5w], Lipitor [atorvastatin], Other, Pecan nut (diagnostic), Prochlorperazine, Red dye, Reglan [metoclopramide], Rocephin [ceftriaxone], Rosemary oil, Topamax [topiramate], Tramadol, Triptans, [place] [macadamia nut oil], Abilify [aripiprazole], Aspartame and phenylalanine, Cefdinir, Darvon [propoxyphene], Dilaudid [hydromorphone hcl], Doxycycline, Effexor [venlafaxine], Gluten meal, Hydrochlorothiazide, Iron, Meclizine, Milk-related compounds, Sulfa antibiotics, Tetracyclines & related, Wheat bran, Zithromax [azithromycin], Adhesive tape, and Betamethasone    Social History:   TOBACCO:   reports that she has never smoked. She has never used smokeless tobacco.  ETOH:   reports no history of alcohol use. Family History:       Problem Relation Age of Onset    Alzheimer's Disease Mother     Alzheimer's Disease Father     Heart Disease Father 59        MI  Stents    Stroke Father     Stroke Sister     Osteoporosis Sister     Hypertension Brother     High Cholesterol Brother     Alzheimer's Disease Paternal Aunt     Alzheimer's Disease Paternal Uncle        REVIEW OF SYSTEMS:    · Constitutional: No fever, no chills, no change in weight; good appetite  · HEENT: No blurred vision, no ear problems, no sore throat, no rhinorrhea. · Respiratory: No cough, no sputum production, no pleuritic chest pain, no shortness of breath  · Cardiology: No angina, no dyspnea on exertion, no paroxysmal nocturnal dyspnea, no orthopnea, no palpitation, no leg swelling. · Gastroenterology: No dysphagia, no reflux; no abdominal pain, no nausea or vomiting; no constipation or diarrhea. No hematochezia   · Genitourinary: No dysuria, no frequency, hesitancy; no hematuria  · Musculoskeletal: no joint pain, no myalgia, no change in range of movement  · Neurology: + focal weakness in extremities, no slurred speech, Diminished vision b/l, + tingling or numbness sensation  · Endocrinology: no temperature intolerance, no polyphagia, polydipsia or polyuria  · Hematology: no increased bleeding, no bruising, no lymphadenopathy  · Skin: no skin changes noticed by patient  · Psychology: no depressed mood, no suicidal ideation    Physical Exam   · Vitals: BP (!) 146/101   Pulse 81   Resp 16   Ht 5' 3\" (1.6 m)   Wt 206 lb (93.4 kg)   SpO2 98%   BMI 36.49 kg/m²     Physical Exam  Constitutional:       General: She is not in acute distress.      Appearance: and neck. CTA NECK W CONTRAST   Final Result   1. No acute intracranial abnormality. 2. No perfusion mismatch. 3. No acute abnormality or flow-limiting stenosis of the major arteries of   the head and neck. CTA HEAD W CONTRAST   Final Result   1. No acute intracranial abnormality. 2. No perfusion mismatch. 3. No acute abnormality or flow-limiting stenosis of the major arteries of   the head and neck. CT BRAIN PERFUSION   Final Result   1. No acute intracranial abnormality. 2. No perfusion mismatch. 3. No acute abnormality or flow-limiting stenosis of the major arteries of   the head and neck. XR CHEST PORTABLE   Final Result   No radiographic evidence of acute abnormality. EKG: Normal sinus rhythm  Possible Anterior infarct (cited on or before 28-JUL-2021)  Abnormal ECG  When compared with ECG of 15-JUL-2021 03:59,  No significant change was found    Resident's Assessment and Plan     Assessment and Plan:    1. Complex migraine with hemiplegia versus TIA  a. Substantial migraine history noted per EMR  b. Patient has right-sided weakness in upper and lower extremity  c. CTA head: No acute intracranial abnormality  d. CT head:No acute intracranial abnormality  e. CTA neck no acute intracranial abnormality or perfusion mismatch  f. CT brain shows no acute intercranial abnormality or perfusion mismatch  g. Neurology was consulted  h. MRI planned  i. LP per neurology  j. Continue neurochecks  k. Continue low-dose statin  l. Cont  Zanaflex, magnesium, propranolol, Depakote, and Emgality. m. Patient does not have a headache diary. n. Last hemiplegic migraine documented on 5/27/2021  o. Patient worked up for [address] in the past where MRIs without contrast were unremarkable MRI brain and cervical spine with contrast were unremarkable for acute process LP was positive for an oligoclonal banding; NML less than 1.5, WBC 4 with elevated protein and glucose  p.  Per last neurology note it was recommended take 3 mg melatonin at 6 PM and 3 mg at 9 PM to abort headaches  q. Continue to observe  r. zofran prn for nausea   2. History of hypogammaglobinemia  a. Patient currently receiving IVIG once monthly and on Gammagard through an immunologist at [facility]  3. Bilateral vision loss  a. Worse in the left eye and see shadow right eye  4. History of type 2 diabetes mellitus  a. A1c 10.9 per EMR  b. Patient currently uses Lantus 22 units in the morning and 18 units in the evening along with 4 to 6 units of Humalog with each meal  c. Currently on Metformin  mg twice daily  5. History of Sjogren's syndrome with keratoconjunctivitis sicca  a. Continue home meds  6. History of hypothyroidism  a. Continue synthroid  7. History of PAULINE  a.  Patient uses CPAP at home      PT/OT evaluation: Not at this time  DVT prophylaxis/ GI prophylaxis: protonix  Disposition: admit to house Tiago Wilson DO, PhD  PGY-1  Attending physician: Dr. Joaquín Marin

## 2021-07-28 NOTE — ED PROVIDER NOTES
HPI   47-year-old female patient with past medical history diabetes, Sjogren's disease, myalgia, hypogammaglobulinemia presented to emergency department for dizziness and extremity weakness. Patient says she went to bed yesterday 10 PM and woke up around 4 AM this morning when she felt feeling dizzy and be right arm and right leg. Patient denies any headache or extremity weakness symptoms in the past.  She does have an associated headache which is concentrated in the lower portion of her head. Patient says normally she is ambulatory but is unable to do so now secondary to be weakness in her extremities. Earlier today she also had some generalized abdominal pain which is now currently resolved. She was recently seen and discharged from [facility]. At that time she did have head CT and MRI with no acute findings. Review of Systems   Constitutional: Negative for fever. HENT: Negative for congestion. Respiratory: Negative for cough and shortness of breath. Gastrointestinal: Negative for nausea. Genitourinary: Negative for dysuria and frequency. Neurological: Positive for dizziness, numbness and headaches. Extremity weakness   All other systems reviewed and are negative. Physical Exam  Vitals and nursing note reviewed. Constitutional:       Appearance: Normal appearance. HENT:      Head: Normocephalic and atraumatic. Nose: Nose normal. No congestion. Mouth/Throat:      Mouth: Mucous membranes are moist.      Pharynx: Oropharynx is clear. Eyes:      General: No scleral icterus. Conjunctiva/sclera: Conjunctivae normal.      Pupils: Pupils are equal, round, and reactive to light. 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 Leftward nystagmus   Cardiovascular:      Rate and Rhythm: Normal rate and regular rhythm. Pulses: Normal pulses. Heart sounds: Normal heart sounds. Pulmonary:      Effort: Pulmonary effort is normal. No respiratory distress.       Breath sounds: Normal breath sounds. Abdominal:      General: Bowel sounds are normal. There is no distension. Tenderness: There is no abdominal tenderness. Musculoskeletal:         General: Normal range of motion. Cervical back: Normal range of motion and neck supple. No tenderness. Right lower leg: No edema. Left lower leg: No edema. Skin:     General: Skin is warm and dry. Capillary Refill: Capillary refill takes less than 2 seconds. Comments: Right chest wall port   Neurological:      General: No focal deficit present. Mental Status: She is alert. Comments: Weakness in the right arm and right leg. There is also decreased sensation in those extremities. Patient is alert and oriented x3. Patient is blind in the left eye. Procedures     MDM   53-year-old female here for upper and lower extremity weakness as well as headache and dizziness. Patient had imaging of brain and showed no intracranial hemorrhage or large vessel occlusion. Not a TPA or interventional radiology candidate. Lab work is not showing any acute abnormalities. Patient still having weakness here however somewhat improved. Still unable to ambulate. At this time patient will be admitted for further work-up. EKG: This EKG is signed and interpreted by me. Rate:77  Rhythm: Sinus  Interpretation: no acute changes  Comparison: stable as compared to patient's most recent EKG         NIH Stroke Scale/Score at time of initial evaluation:  1A: Level of Consciousness 0 - alert; keenly responsive   1B: Ask Month and Age 0 - answers both questions correctly   1C:  Tell Patient To Open and Close Eyes, then Hand  Squeeze 0 - performs both tasks correctly   2: Test Horizontal Extraocular Movements 0 - normal   3: Test Visual Fields 2 - complete hemianopia   4: Test Facial Palsy 0 - normal symmetric movement   5A: Test Left Arm Motor Drift 0 - no drift, limb holds 90 (or 45) degrees for full 10 seconds 5B: Test Right Arm Motor Drift 1 - drift, limb holds 90 (or 45) degrees but drifts down before full 10 seconds: does not hit bed   6A: Test Left Leg Motor Drift 0 - no drift; leg holds 30 degree position for full 5 seconds   6B: Test Right Leg Motor Drift 1 - drift; leg falls by the end of the 5 second period but does not hit bed   7: Test Limb Ataxia   (FNF/Heel-Shin) 0 - absent   8: Test Sensation 1 - mild to moderate sensory loss; patient feels pinprick is less sharp or is dull on the affected side; there is a loss of superficial pain with pinprick but patient is aware of being touched    9: Test Language/Aphasia 0 - no aphasia, normal   10: Test Dysarthria 0 - normal   11: Test Extinction/Inattention 0 - no abnormality   Total 5, patient blind in the left eye at baseline          --------------------------------------------- PAST HISTORY ---------------------------------------------  Past Medical History:  has a past medical history of Abnormal Pap smear of cervix, Anemia, Anxiety, Asthma, Blind, Connective tissue disease ([address] Cyst of right breast, DDD (degenerative disc disease), cervical, Depression, Diabetes mellitus ([address] Dysphagia, Esophagus disorder, Fibromyalgia, Gastroparesis, GERD (gastroesophageal reflux disease), Headache, Hematuria, Hyperlipidemia, Hypertension, Hypothyroidism, IBS (irritable bowel syndrome), Immune deficiency disorder ([address] Insomnia, Kidney stones, Meningitis, Meningitis, PAULINE on CPAP, PCOS (polycystic ovarian syndrome), POTS (postural orthostatic tachycardia syndrome), Problems with swallowing, Pseudotumor cerebri, Raynaud's disease, Rectal bleeding, RLS (restless legs syndrome), Scleroderma ([address] Sjogren's disease ([address] Thyroid nodule, and TIA (transient ischemic attack). Past Surgical History:  has a past surgical history that includes Hysterectomy; Cholecystectomy; Appendectomy; Carpal tunnel release (Bilateral); Port Surgery; and Pyloroplasty.     Social History: reports that she has never smoked. She has never used smokeless tobacco. She reports that she does not drink alcohol and does not use drugs. Family History: family history includes Alzheimer's Disease in her father, mother, paternal aunt, and paternal uncle; Heart Disease (age of onset: 59) in her father; High Cholesterol in her brother; Hypertension in her brother; Osteoporosis in her sister; Stroke in her father and sister. The patients home medications have been reviewed.     Allergies: Cymbalta [duloxetine hcl], Diamox [acetazolamide], Doxepin, Duloxetine, Grapeseed extract [nutritional supplements], Levaquin [levofloxacin in d5w], Lipitor [atorvastatin], Other, Pecan nut (diagnostic), Prochlorperazine, Red dye, Reglan [metoclopramide], Rocephin [ceftriaxone], Rosemary oil, Topamax [topiramate], Tramadol, Triptans, [place] [macadamia nut oil], Abilify [aripiprazole], Aspartame and phenylalanine, Cefdinir, Darvon [propoxyphene], Dilaudid [hydromorphone hcl], Doxycycline, Effexor [venlafaxine], Gluten meal, Hydrochlorothiazide, Iron, Meclizine, Milk-related compounds, Sulfa antibiotics, Tetracyclines & related, Wheat bran, Zithromax [azithromycin], Adhesive tape, and Betamethasone    -------------------------------------------------- RESULTS -------------------------------------------------    LABS:  Results for orders placed or performed during the hospital encounter of 07/28/21   CBC Auto Differential   Result Value Ref Range    WBC 5.1 4.5 - 11.5 E9/L    RBC 4.03 3.50 - 5.50 E12/L    Hemoglobin 12.1 11.5 - 15.5 g/dL    Hematocrit 37.3 34.0 - 48.0 %    MCV 92.6 80.0 - 99.9 fL    MCH 30.0 26.0 - 35.0 pg    MCHC 32.4 32.0 - 34.5 %    RDW 14.7 11.5 - 15.0 fL    Platelets 026 224 - 078 E9/L    MPV 10.5 7.0 - 12.0 fL    Neutrophils % 50.2 43.0 - 80.0 %    Immature Granulocytes % 1.8 0.0 - 5.0 %    Lymphocytes % 38.7 20.0 - 42.0 %    Monocytes % 8.5 2.0 - 12.0 %    Eosinophils % 0.4 0.0 - 6.0 %    Basophils % 0.4 0.0 - 2.0 %    Neutrophils Absolute 2.55 1.80 - 7.30 E9/L    Immature Granulocytes # 0.09 E9/L    Lymphocytes Absolute 1.96 1.50 - 4.00 E9/L    Monocytes Absolute 0.43 0.10 - 0.95 E9/L    Eosinophils Absolute 0.02 (L) 0.05 - 0.50 E9/L    Basophils Absolute 0.02 0.00 - 0.20 E9/L   Comprehensive Metabolic Panel w/ Reflex to MG   Result Value Ref Range    Sodium 139 132 - 146 mmol/L    Potassium reflex Magnesium 4.1 3.5 - 5.0 mmol/L    Chloride 104 98 - 107 mmol/L    CO2 26 22 - 29 mmol/L    Anion Gap 9 7 - 16 mmol/L    Glucose 186 (H) 74 - 99 mg/dL    BUN 19 6 - 20 mg/dL    CREATININE 0.7 0.5 - 1.0 mg/dL    GFR Non-African American >60 >=60 mL/min/1.73    GFR African American >60     Calcium 9.2 8.6 - 10.2 mg/dL    Total Protein 7.4 6.4 - 8.3 g/dL    Albumin 3.8 3.5 - 5.2 g/dL    Total Bilirubin 0.4 0.0 - 1.2 mg/dL    Alkaline Phosphatase 42 35 - 104 U/L    ALT 22 0 - 32 U/L    AST 19 0 - 31 U/L   Troponin   Result Value Ref Range    Troponin, High Sensitivity <6 0 - 9 ng/L   Protime-INR   Result Value Ref Range    Protime 10.9 9.3 - 12.4 sec    INR 1.0    APTT   Result Value Ref Range    aPTT 39.8 (H) 24.5 - 35.1 sec   Lipase   Result Value Ref Range    Lipase 54 13 - 60 U/L   Lactic Acid, Plasma   Result Value Ref Range    Lactic Acid 1.0 0.5 - 2.2 mmol/L   Urinalysis with Microscopic   Result Value Ref Range    Color, UA Yellow Straw/Yellow    Clarity, UA Clear Clear    Glucose, Ur Negative Negative mg/dL    Bilirubin Urine Negative Negative    Ketones, Urine Negative Negative mg/dL    Specific Gravity, UA 1.010 1.005 - 1.030    Blood, Urine Negative Negative    pH, UA 7.0 5.0 - 9.0    Protein, UA Negative Negative mg/dL    Urobilinogen, Urine 0.2 <2.0 E.U./dL    Nitrite, Urine Negative Negative    Leukocyte Esterase, Urine Negative Negative    WBC, UA NONE 0 - 5 /HPF    RBC, UA NONE 0 - 2 /HPF    Bacteria, UA NONE SEEN None Seen /HPF   POC Pregnancy Urine   Result Value Ref Range    HCG, Urine, POC Negative Negative    Lot Number [id number]     Positive QC Pass/Fail Pass     Negative QC Pass/Fail Pass    EKG 12 Lead   Result Value Ref Range    Ventricular Rate 77 BPM    Atrial Rate 77 BPM    P-R Interval 150 ms    QRS Duration 78 ms    Q-T Interval 376 ms    QTc Calculation (Bazett) 425 ms    P Axis 45 degrees    R Axis 18 degrees    T Axis 35 degrees       RADIOLOGY:  CT HEAD WO CONTRAST   Final Result   1. No acute intracranial abnormality. 2. No perfusion mismatch. 3. No acute abnormality or flow-limiting stenosis of the major arteries of   the head and neck. CTA NECK W CONTRAST   Final Result   1. No acute intracranial abnormality. 2. No perfusion mismatch. 3. No acute abnormality or flow-limiting stenosis of the major arteries of   the head and neck. CTA HEAD W CONTRAST   Final Result   1. No acute intracranial abnormality. 2. No perfusion mismatch. 3. No acute abnormality or flow-limiting stenosis of the major arteries of   the head and neck. CT BRAIN PERFUSION   Final Result   1. No acute intracranial abnormality. 2. No perfusion mismatch. 3. No acute abnormality or flow-limiting stenosis of the major arteries of   the head and neck. XR CHEST PORTABLE   Final Result   No radiographic evidence of acute abnormality.               ------------------------- NURSING NOTES AND VITALS REVIEWED ---------------------------  Date / Time Roomed:  7/28/2021  6:06 AM  ED Bed Assignment:  20/20    The nursing notes within the ED encounter and vital signs as below have been reviewed.      Patient Vitals for the past 24 hrs:   BP Pulse Resp SpO2 Height Weight   07/28/21 0603 (!) 146/101 81 16 98 % 5' 3\" (1.6 m) 206 lb (93.4 kg)       Oxygen Saturation Interpretation: Normal    ------------------------------------------ PROGRESS NOTES ------------------------------------------  Re-evaluation(s):  Time: 12pm  Patients symptoms show no change  Repeat physical examination is not changed    Counseling:  I have spoken with the patient and discussed todays results, in addition to providing specific details for the plan of care and counseling regarding the diagnosis and prognosis. Their questions are answered at this time and they are agreeable with the plan of admission.    --------------------------------- ADDITIONAL PROVIDER NOTES ---------------------------------  Consultations:  Time: 12pm. Spoke with Dr. Coleen Harris. Discussed case. They will admit the patient. This patient's ED course included: a personal history and physicial examination, re-evaluation prior to disposition, multiple bedside re-evaluations, IV medications and cardiac monitoring    This patient has remained hemodynamically stable during their ED course. Diagnosis:  1. Stroke-like symptoms        Disposition:  Patient's disposition: Admit to telemetry  Patient's condition is stable.          Bar West DO  Resident  07/28/21 [id number]

## 2021-07-29 ENCOUNTER — APPOINTMENT (OUTPATIENT)
Dept: MRI IMAGING | Age: 46
DRG: 047 | End: 2021-07-29
Payer: MEDICAID

## 2021-07-29 LAB
ANION GAP SERPL CALCULATED.3IONS-SCNC: 9 MMOL/L (ref 7–16)
BASOPHILS ABSOLUTE: 0.02 E9/L (ref 0–0.2)
BASOPHILS RELATIVE PERCENT: 0.5 % (ref 0–2)
BUN BLDV-MCNC: 23 MG/DL (ref 6–20)
CALCIUM SERPL-MCNC: 8.4 MG/DL (ref 8.6–10.2)
CHLORIDE BLD-SCNC: 106 MMOL/L (ref 98–107)
CHOLESTEROL, TOTAL: 148 MG/DL (ref 0–199)
CO2: 28 MMOL/L (ref 22–29)
CREAT SERPL-MCNC: 0.8 MG/DL (ref 0.5–1)
EOSINOPHILS ABSOLUTE: 0.03 E9/L (ref 0.05–0.5)
EOSINOPHILS RELATIVE PERCENT: 0.8 % (ref 0–6)
GFR AFRICAN AMERICAN: >60
GFR NON-AFRICAN AMERICAN: >60 ML/MIN/1.73
GLUCOSE BLD-MCNC: 103 MG/DL (ref 74–99)
HBA1C MFR BLD: 8.6 % (ref 4–5.6)
HCT VFR BLD CALC: 40.5 % (ref 34–48)
HDLC SERPL-MCNC: 36 MG/DL
HEMOGLOBIN: 12.4 G/DL (ref 11.5–15.5)
IMMATURE GRANULOCYTES #: 0.05 E9/L
IMMATURE GRANULOCYTES %: 1.3 % (ref 0–5)
LDL CHOLESTEROL CALCULATED: 56 MG/DL (ref 0–99)
LYMPHOCYTES ABSOLUTE: 2.38 E9/L (ref 1.5–4)
LYMPHOCYTES RELATIVE PERCENT: 59.8 % (ref 20–42)
MCH RBC QN AUTO: 29.5 PG (ref 26–35)
MCHC RBC AUTO-ENTMCNC: 30.6 % (ref 32–34.5)
MCV RBC AUTO: 96.4 FL (ref 80–99.9)
METER GLUCOSE: 100 MG/DL (ref 74–99)
METER GLUCOSE: 152 MG/DL (ref 74–99)
METER GLUCOSE: 186 MG/DL (ref 74–99)
METER GLUCOSE: 192 MG/DL (ref 74–99)
MONOCYTES ABSOLUTE: 0.33 E9/L (ref 0.1–0.95)
MONOCYTES RELATIVE PERCENT: 8.3 % (ref 2–12)
NEUTROPHILS ABSOLUTE: 1.17 E9/L (ref 1.8–7.3)
NEUTROPHILS RELATIVE PERCENT: 29.3 % (ref 43–80)
PDW BLD-RTO: 15.4 FL (ref 11.5–15)
PLATELET # BLD: 175 E9/L (ref 130–450)
PMV BLD AUTO: 11.3 FL (ref 7–12)
POTASSIUM REFLEX MAGNESIUM: 3.7 MMOL/L (ref 3.5–5)
RBC # BLD: 4.2 E12/L (ref 3.5–5.5)
SODIUM BLD-SCNC: 143 MMOL/L (ref 132–146)
TRIGL SERPL-MCNC: 278 MG/DL (ref 0–149)
VLDLC SERPL CALC-MCNC: 56 MG/DL
WBC # BLD: 4 E9/L (ref 4.5–11.5)

## 2021-07-29 PROCEDURE — 2580000003 HC RX 258: Performed by: INTERNAL MEDICINE

## 2021-07-29 PROCEDURE — 2140000000 HC CCU INTERMEDIATE R&B

## 2021-07-29 PROCEDURE — 94760 N-INVAS EAR/PLS OXIMETRY 1: CPT

## 2021-07-29 PROCEDURE — 6360000002 HC RX W HCPCS: Performed by: INTERNAL MEDICINE

## 2021-07-29 PROCEDURE — 6360000004 HC RX CONTRAST MEDICATION: Performed by: RADIOLOGY

## 2021-07-29 PROCEDURE — 99253 IP/OBS CNSLTJ NEW/EST LOW 45: CPT | Performed by: PSYCHIATRY & NEUROLOGY

## 2021-07-29 PROCEDURE — 97530 THERAPEUTIC ACTIVITIES: CPT

## 2021-07-29 PROCEDURE — 36415 COLL VENOUS BLD VENIPUNCTURE: CPT

## 2021-07-29 PROCEDURE — G0378 HOSPITAL OBSERVATION PER HR: HCPCS

## 2021-07-29 PROCEDURE — 94640 AIRWAY INHALATION TREATMENT: CPT

## 2021-07-29 PROCEDURE — 94761 N-INVAS EAR/PLS OXIMETRY MLT: CPT

## 2021-07-29 PROCEDURE — 82962 GLUCOSE BLOOD TEST: CPT

## 2021-07-29 PROCEDURE — 96372 THER/PROPH/DIAG INJ SC/IM: CPT

## 2021-07-29 PROCEDURE — 99232 SBSQ HOSP IP/OBS MODERATE 35: CPT | Performed by: INTERNAL MEDICINE

## 2021-07-29 PROCEDURE — 6370000000 HC RX 637 (ALT 250 FOR IP): Performed by: INTERNAL MEDICINE

## 2021-07-29 PROCEDURE — 83036 HEMOGLOBIN GLYCOSYLATED A1C: CPT

## 2021-07-29 PROCEDURE — 80061 LIPID PANEL: CPT

## 2021-07-29 PROCEDURE — 6370000000 HC RX 637 (ALT 250 FOR IP): Performed by: STUDENT IN AN ORGANIZED HEALTH CARE EDUCATION/TRAINING PROGRAM

## 2021-07-29 PROCEDURE — 97161 PT EVAL LOW COMPLEX 20 MIN: CPT

## 2021-07-29 PROCEDURE — 70553 MRI BRAIN STEM W/O & W/DYE: CPT

## 2021-07-29 PROCEDURE — 85025 COMPLETE CBC W/AUTO DIFF WBC: CPT

## 2021-07-29 PROCEDURE — 80048 BASIC METABOLIC PNL TOTAL CA: CPT

## 2021-07-29 PROCEDURE — A9577 INJ MULTIHANCE: HCPCS | Performed by: RADIOLOGY

## 2021-07-29 RX ORDER — ASPIRIN 81 MG/1
81 TABLET ORAL DAILY
Status: DISCONTINUED | OUTPATIENT
Start: 2021-07-29 | End: 2021-07-30 | Stop reason: HOSPADM

## 2021-07-29 RX ORDER — ATORVASTATIN CALCIUM 20 MG/1
20 TABLET, FILM COATED ORAL NIGHTLY
Status: DISCONTINUED | OUTPATIENT
Start: 2021-07-29 | End: 2021-07-30 | Stop reason: HOSPADM

## 2021-07-29 RX ADMIN — FOLIC ACID 1 MG: 1 TABLET ORAL at 11:56

## 2021-07-29 RX ADMIN — PROPRANOLOL HYDROCHLORIDE 10 MG: 10 TABLET ORAL at 09:02

## 2021-07-29 RX ADMIN — ATORVASTATIN CALCIUM 20 MG: 20 TABLET, FILM COATED ORAL at 23:03

## 2021-07-29 RX ADMIN — ACETAMINOPHEN 650 MG: 325 TABLET ORAL at 16:47

## 2021-07-29 RX ADMIN — ROPINIROLE HYDROCHLORIDE 1 MG: 1 TABLET, FILM COATED ORAL at 22:45

## 2021-07-29 RX ADMIN — ACETAMINOPHEN 650 MG: 325 TABLET ORAL at 05:04

## 2021-07-29 RX ADMIN — GADOBENATE DIMEGLUMINE 18 ML: 529 INJECTION, SOLUTION INTRAVENOUS at 21:37

## 2021-07-29 RX ADMIN — INSULIN LISPRO 1 UNITS: 100 INJECTION, SOLUTION INTRAVENOUS; SUBCUTANEOUS at 22:47

## 2021-07-29 RX ADMIN — ARFORMOTEROL TARTRATE 15 MCG: 15 SOLUTION RESPIRATORY (INHALATION) at 09:27

## 2021-07-29 RX ADMIN — INSULIN LISPRO 2 UNITS: 100 INJECTION, SOLUTION INTRAVENOUS; SUBCUTANEOUS at 12:14

## 2021-07-29 RX ADMIN — DIVALPROEX SODIUM 500 MG: 500 TABLET, EXTENDED RELEASE ORAL at 22:45

## 2021-07-29 RX ADMIN — INSULIN LISPRO 2 UNITS: 100 INJECTION, SOLUTION INTRAVENOUS; SUBCUTANEOUS at 16:49

## 2021-07-29 RX ADMIN — INSULIN GLARGINE 4 UNITS: 100 INJECTION, SOLUTION SUBCUTANEOUS at 06:09

## 2021-07-29 RX ADMIN — Medication 10 ML: at 09:03

## 2021-07-29 RX ADMIN — FUROSEMIDE 20 MG: 20 TABLET ORAL at 09:01

## 2021-07-29 RX ADMIN — ENOXAPARIN SODIUM 40 MG: 100 INJECTION SUBCUTANEOUS at 09:01

## 2021-07-29 RX ADMIN — ROPINIROLE HYDROCHLORIDE 0.5 MG: 0.5 TABLET, FILM COATED ORAL at 09:02

## 2021-07-29 RX ADMIN — PROPRANOLOL HYDROCHLORIDE 10 MG: 10 TABLET ORAL at 14:57

## 2021-07-29 RX ADMIN — MONTELUKAST SODIUM 10 MG: 10 TABLET ORAL at 22:45

## 2021-07-29 RX ADMIN — DIVALPROEX SODIUM 500 MG: 500 TABLET, EXTENDED RELEASE ORAL at 09:01

## 2021-07-29 RX ADMIN — POLYETHYLENE GLYCOL 3350 17 G: 17 POWDER, FOR SOLUTION ORAL at 11:56

## 2021-07-29 RX ADMIN — ARFORMOTEROL TARTRATE 15 MCG: 15 SOLUTION RESPIRATORY (INHALATION) at 19:57

## 2021-07-29 RX ADMIN — ASPIRIN 81 MG: 81 TABLET, COATED ORAL at 16:47

## 2021-07-29 RX ADMIN — DIVALPROEX SODIUM 500 MG: 500 TABLET, EXTENDED RELEASE ORAL at 14:57

## 2021-07-29 RX ADMIN — BUDESONIDE 1000 MCG: 0.5 SUSPENSION RESPIRATORY (INHALATION) at 19:57

## 2021-07-29 RX ADMIN — BUDESONIDE 1000 MCG: 0.5 SUSPENSION RESPIRATORY (INHALATION) at 09:27

## 2021-07-29 RX ADMIN — PROPRANOLOL HYDROCHLORIDE 10 MG: 10 TABLET ORAL at 22:45

## 2021-07-29 RX ADMIN — Medication 400 MG: at 09:02

## 2021-07-29 RX ADMIN — Medication 400 MG: at 22:45

## 2021-07-29 RX ADMIN — PANTOPRAZOLE SODIUM 40 MG: 40 TABLET, DELAYED RELEASE ORAL at 06:10

## 2021-07-29 RX ADMIN — Medication 10 ML: at 22:45

## 2021-07-29 RX ADMIN — INSULIN GLARGINE 7 UNITS: 100 INJECTION, SOLUTION SUBCUTANEOUS at 22:47

## 2021-07-29 ASSESSMENT — PAIN SCALES - GENERAL
PAINLEVEL_OUTOF10: 5
PAINLEVEL_OUTOF10: 8
PAINLEVEL_OUTOF10: 0
PAINLEVEL_OUTOF10: 0
PAINLEVEL_OUTOF10: 6

## 2021-07-29 ASSESSMENT — VISUAL ACUITY: VA_NORMAL: 1

## 2021-07-29 ASSESSMENT — PAIN DESCRIPTION - FREQUENCY: FREQUENCY: CONTINUOUS

## 2021-07-29 ASSESSMENT — PAIN - FUNCTIONAL ASSESSMENT: PAIN_FUNCTIONAL_ASSESSMENT: ACTIVITIES ARE NOT PREVENTED

## 2021-07-29 ASSESSMENT — PAIN DESCRIPTION - PAIN TYPE: TYPE: ACUTE PAIN

## 2021-07-29 ASSESSMENT — PAIN DESCRIPTION - DESCRIPTORS: DESCRIPTORS: HEADACHE

## 2021-07-29 ASSESSMENT — PAIN DESCRIPTION - LOCATION: LOCATION: HEAD

## 2021-07-29 NOTE — CONSULTS
Cedric Hughes is a 39 y.o. female       Chief Complaint   Patient presents with    Fatigue     states she was just discharged last thursday from hospital and has had increasing weakness    Dizziness    Headache     \"burning sensation around my head\"       HPI:  24-year-old woman with complex past medical history including migraines, mixed classic connective tissue disorder, TIAs, left eye blindness, pots is presenting to the hospital with right-sided weakness and numbness that occurred upon waking up in the morning. She had associated symptoms of dizziness and headache. She had similar but less severe symptoms about a week ago she had an MRI performed of the shunt with results are. She has also had slurring of her words. HPI  Prior to Visit Medications    Medication Sig Taking? Authorizing Provider   Cinnamon 500 MG CAPS Take 600 mg by mouth 2 times daily Yes Historical Provider, MD   NONFORMULARY 300 mg 3 times daily Carlos - Holy Basal Yes Historical Provider, MD   cimetidine (TAGAMET) 300 MG tablet Take 300 mg by mouth 3 times daily Yes Historical Provider, MD   levothyroxine (SYNTHROID) 50 MCG tablet Take 2.5 tablets by mouth Five times weekly Given Monday,Tuesday,Wednesday,Thursday,Friday Yes Vicenta Wallace, DO   CPAP Machine MISC by Does not apply route nightly. 5-10cm.  Yes Historical Provider, MD   Immune Globulin, Human, (GAMMAGARD IV) Infuse intravenously 40 gm per port once a month Yes Historical Provider, MD   propranolol (INDERAL) 10 MG tablet TAKE 1 TABLET BY MOUTH THREE TIMES DAILY Yes Vicenta Wallace,    ascorbic acid (VITAMIN C) 500 MG tablet TAKE 1 TABLET BY MOUTH EVERY DAY Yes Historical Provider, MD   divalproex (DEPAKOTE ER) 500 MG extended release tablet TAKE 1 TABLET BY MOUTH THREE TIMES DAILY Yes Historical Provider, MD   zinc 50 MG CAPS Take 50 mg by mouth daily Yes Rafia Galdamez APRN - CNP   polyethylene glycol (GLYCOLAX) 17 g packet Take 17 g by mouth 2 times daily as needed for Constipation Yes Becca Gerard, DO   Polyvinyl Alcohol-Povidone (REFRESH OP) Apply to eye Artificial tears Yes Historical Provider, MD   insulin glargine (LANTUS SOLOSTAR) 100 UNIT/ML injection pen Injects 8 units in the morning and 14 units at night Yes Historical Provider, MD   ketorolac (TORADOL) 10 MG tablet Take 1 tablet by mouth every 6 hours as needed for Pain Yes Marius Sandifer, DO   Magnesium 500 MG TABS Take 1 tablet by mouth 2 times daily Yes Historical Provider, MD   insulin lispro, 1 Unit Dial, (HUMALOG KWIKPEN) 100 UNIT/ML SOPN Inject into the skin daily Sliding scale: 150-200- 4 units, then add 2 units for every 50 up to a max of 12 units.  Yes Historical Provider, MD   Probiotic Product (PROBIOTIC DAILY PO) daily  Yes Historical Provider, MD   furosemide (LASIX) 20 MG tablet Take 1 tablet by mouth daily Yes Vicenta Wallace,    aspirin 81 MG EC tablet Take 81 mg by mouth daily  Yes Historical Provider, MD   rOPINIRole (REQUIP) 0.5 MG tablet Take 1 tablet by mouth See Admin Instructions 1 tablet - breakfast  2 tablets - supper  Patient taking differently: Take 0.5 mg by mouth See Admin Instructions 1 tablet qAM and 2 tablets qPM Yes Vicenta Wallace,    pantoprazole (PROTONIX) 40 MG tablet Take 1 tablet by mouth every morning (before breakfast)  Patient taking differently: Take 40 mg by mouth 2 times daily  Yes Vicenta Wallace DO   folic acid (FOLVITE) 1 MG tablet Take 1 tablet by mouth Daily with lunch Yes Vicenta Wallace DO   acetaminophen (TYLENOL) 325 MG tablet Take 650 mg by mouth every 6 hours as needed for Pain Yes Historical Provider, MD   sodium chloride (OCEAN, BABY AYR) 0.65 % nasal spray 1 spray by Nasal route 4 times daily as needed for Congestion Yes Historical Provider, MD   azelastine (ASTELIN) 0.1 % nasal spray 1 spray by Nasal route 2 times daily Yes Historical Provider, MD   diphenhydrAMINE (BENYLIN) 12.5 MG/5ML liquid Take 25 mg by mouth 4 times daily as needed for Allergies (migrane break through) Yes Historical Provider, MD   Artificial Saliva (BIOTENE DRY MOUTH MOISTURIZING MT) Take 1 spray by mouth every 3 hours Yes Historical Provider, MD   OIL OF OREGANO PO Take 60 mg by mouth daily (with breakfast)  Yes Historical Provider, MD   miconazole (MICOTIN) 2 % powder Apply topically daily Apply to groin, under breast and skin folds. Yes Historical Provider, MD   magnesium hydroxide (MILK OF MAGNESIA) 400 MG/5ML suspension Take 30 mLs by mouth 4 times daily as needed for Constipation Yes Historical Provider, MD   SENNA LEAVES PO Take 470 mg by mouth three times daily  Yes Historical Provider, MD   promethazine (PHENERGAN) 25 MG tablet Take 25 mg by mouth every 6 hours as needed for Nausea  Yes Historical Provider, MD   NONFORMULARY Take 2 capsules by mouth 2 times daily (with meals) Turmeric Ginger Yes Historical Provider, MD   potassium chloride (KLOR-CON) 20 MEQ packet Take 20 mEq by mouth daily Yes Vicenta Wallace DO   clonazePAM (KLONOPIN) 0.5 MG tablet Take 0.5 mg by mouth 2 times daily. . Yes Historical Provider, MD   econazole nitrate 1 % cream Apply topically daily as needed (Apply under breasts and folds)  Yes Historical Provider, MD   fluticasone (FLONASE) 50 MCG/ACT nasal spray 1 spray by Nasal route 2 times daily  Yes Historical Provider, MD   fluticasone-salmeterol (ADVAIR HFA) 230-21 MCG/ACT inhaler Inhale 2 puffs into the lungs 2 times daily Yes Historical Provider, MD   montelukast (SINGULAIR) 10 MG tablet Take 10 mg by mouth nightly Yes Historical Provider, MD   ondansetron (ZOFRAN-ODT) 8 MG TBDP disintegrating tablet Take 8 mg by mouth every 8 hours as needed for Nausea or Vomiting  Yes Historical Provider, MD   ipratropium-albuterol (DUONEB) 0.5-2.5 (3) MG/3ML SOLN nebulizer solution Inhale 1 vial into the lungs every 4 hours as needed for Shortness of Breath   Historical Provider, MD     Social History     Tobacco Use    Smoking status: Never Smoker    Smokeless tobacco: Never Used   Vaping Use    Vaping Use: Never used   Substance Use Topics    Alcohol use: No    Drug use: No     Family History   Problem Relation Age of Onset    Alzheimer's Disease Mother     Alzheimer's Disease Father     Heart Disease Father 59        MI  Stents    Stroke Father     Stroke Sister     Osteoporosis Sister     Hypertension Brother     High Cholesterol Brother     Alzheimer's Disease Paternal Aunt     Alzheimer's Disease Paternal Uncle      Past Surgical History:   Procedure Laterality Date    APPENDECTOMY      CARPAL TUNNEL RELEASE Bilateral     CHOLECYSTECTOMY      HYSTERECTOMY      PORT SURGERY      RIGHT chest  3 so far    PYLOROPLASTY       Past Medical History:   Diagnosis Date    Abnormal Pap smear of cervix     hpv age 23   Larryena Ramses Anemia     Anxiety     Asthma     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     Connective tissue disease ([address]     Cyst of right breast     DDD (degenerative disc disease), cervical     Depression     Diabetes mellitus ([address]     Dysphagia     Esophagus disorder     needs stretched ocassionally    Fibromyalgia     Gastroparesis     GERD (gastroesophageal reflux disease)     Headache     Hematuria     Hyperlipidemia     Hypertension     Hypothyroidism     IBS (irritable bowel syndrome)     with constipation and diarrhea    Immune deficiency disorder ([address]     Insomnia     Kidney stones     Meningitis     x4--- twice after IVIG     Meningitis     PAULINE on CPAP     cpap dependence    PCOS (polycystic ovarian syndrome)     POTS (postural orthostatic tachycardia syndrome)     Problems with swallowing     due restrictive esophagus    Pseudotumor cerebri     in 2008--- treated with LP; allergy to diamox    Raynaud's disease     Rectal bleeding     RLS (restless legs syndrome)     Scleroderma ([address]     Sjogren's disease ([address]     Thyroid nodule     TIA (transient ischemic attack)      Review of Systems    Right sided weakness and numbness, headache difficulty with speech. Objective:   BP (!) 113/58   Pulse 76   Temp 98 °F (36.7 °C) (Oral)   Resp 16   Ht 5' 3\" (1.6 m)   Wt 206 lb (93.4 kg)   SpO2 97%   BMI 36.49 kg/m²     Physical Exam  HENT:      Head: Normocephalic and atraumatic. Mouth/Throat:      Mouth: Mucous membranes are moist.      Pharynx: Oropharynx is clear. Eyes:      General: Lids are normal.      Extraocular Movements: Extraocular movements intact. Conjunctiva/sclera: Conjunctivae normal.   Cardiovascular:      Rate and Rhythm: Regular rhythm. Heart sounds: Normal heart sounds. Pulmonary:      Breath sounds: Normal breath sounds. Abdominal:      Palpations: Abdomen is soft. Musculoskeletal:         General: Normal range of motion. Cervical back: Neck supple. Skin:     General: Skin is warm and dry. Neurological:      Mental Status: She is alert and oriented to person, place, and time. Deep Tendon Reflexes:      Reflex Scores:       Tricep reflexes are 1+ on the right side and 1+ on the left side. Bicep reflexes are 1+ on the right side and 1+ on the left side. Patellar reflexes are 1+ on the right side and 1+ on the left side. Achilles reflexes are 1+ on the right side and 1+ on the left side. Psychiatric:         Speech: Speech normal.         Behavior: Behavior normal.                 Neurological Exam  Mental Status  Alert. Oriented to person, place, time and situation. Speech is normal. Language is fluent with no aphasia. Attention and concentration are normal.    Cranial Nerves  CN II: Visual acuity is normal. Visual fields full to confrontation. CN III, IV, VI: Extraocular movements intact bilaterally. Normal lids and orbits bilaterally. Relative afferent pupillary defect present on left. CN V:  Right: Diminished sensation of the entire right side of the face. CN VII: Full and symmetric facial movement.   CN VIII: Hearing is normal.  CN IX, X: Palate elevates symmetrically. Normal gag reflex. CN XI: Shoulder shrug strength is normal.  CN XII: Tongue midline without atrophy or fasciculations. Motor  Normal muscle bulk throughout. Normal muscle tone. No abnormal involuntary movements. Right pronator drift. 3-4/5 strength on the right. .    Sensory  Right hemisensory loss. Reflexes                                           Right                      Left  Biceps                                 1+                         1+  Triceps                                1+                         1+  Patellar                                1+                         1+  Achilles                                1+                         1+  Plantar                           Mute                Mute      Laboratory/Radiology:     CBC with Differential:    Lab Results   Component Value Date    WBC 4.0 07/29/2021    RBC 4.20 07/29/2021    RBC 4.31 03/11/2020    HGB 12.4 07/29/2021    HCT 40.5 07/29/2021     07/29/2021    MCV 96.4 07/29/2021    MCH 29.5 07/29/2021    MCHC 30.6 07/29/2021    RDW 15.4 07/29/2021    SEGSPCT 69.6 01/22/2021    LYMPHOPCT 59.8 07/29/2021    LYMPHOPCT 29.7 03/11/2020    MONOPCT 8.3 07/29/2021    BASOPCT 0.5 07/29/2021    MONOSABS 0.33 07/29/2021    LYMPHSABS 2.38 07/29/2021    EOSABS 0.03 07/29/2021    BASOSABS 0.02 07/29/2021     CMP:    Lab Results   Component Value Date     07/29/2021    K 3.7 07/29/2021     07/29/2021    CO2 28 07/29/2021    BUN 23 07/29/2021    CREATININE 0.8 07/29/2021    GFRAA >60 07/29/2021    AGRATIO 1.0 01/22/2021    LABGLOM >60 07/29/2021    GLUCOSE 103 07/29/2021    PROT 7.4 07/28/2021    LABALBU 3.8 07/28/2021    CALCIUM 8.4 07/29/2021    BILITOT 0.4 07/28/2021    ALKPHOS 42 07/28/2021    AST 19 07/28/2021    ALT 22 07/28/2021       CT head:  Negative     I independently reviewed the labs and imaging studies at today's appointment.      Assessment: Complex migraine vs stroke vs MS. Conversion reaction also can be considered. Plan:   Follow up MRI of the brain w/wo contrast.   Supportive care.        Ramirez De La Cruz MD  3:26 PM  7/29/2021

## 2021-07-29 NOTE — PROGRESS NOTES
Physical Therapy  Physical Therapy Initial Assessment     Name: Jaleesa Alvarenga  : 1975  MRN: [id number]      Date of Service: 2021    Evaluating PT:  Grayson Rome PT, DPT [id number]     Room #:  0957/8715-F  Diagnosis:  Stroke-like symptoms [R29.90]  Reason for admission: fatigue, weakness   Precautions:  Falls, R sided weakness, L eye blind   Procedure/Surgery:  none  Equipment Recommendations:  FWW    SUBJECTIVE:  Pt lives with significant other in a 1 story home with 2+1 stair(s) to enter and 0 rail(s). Bed is on 1st floor and bath is on 1st floor. Pt ambulated with no AD PTA. OBJECTIVE:   Initial Evaluation  Date:  Treatment   Short Term/ Long Term   Goals   AM-PAC 6 Clicks      Was pt agreeable to Eval/treatment? Yes      Does pt have pain? Denies      Bed Mobility  Rolling: NT  Supine to sit: NT  Sit to supine: NT  Scooting: NT  Independent    Transfers Sit to stand: SBA  Stand to sit: SBA  Stand pivot: NT  Independent    Ambulation    10 feet with [employer] Rodney  And  20 feet with [employer] SBA    >150 feet with AAD Mod I vs independent   Stair negotiation: ascended and descended  NT  3 steps with 0 rail independent   ROM BUE:  See OT eval   BLE:  WFL     Strength BUE:  See OT eval   RLE 3/5  LLE 5/5  Increase by 1/3 MMT grade    Balance Sitting EOB:  NT  Dynamic Standing:  SBA  Sitting EOB:  indep  Dynamic Standing:   Mod I      -Pt is A & O x 3  -Sensation:  Pt denies numbness and tingling to extremities  -Edema:  unremarkable     Therapeutic Exercises:  Functional activity     Patient education  Pt educated on safety, sequencing of transfers, and role of PT    Patient response to education:   Pt verbalized understanding Pt demonstrated skill Pt requires further education in this area   Yes  Yes  Yes      ASSESSMENT:  Conditions Requiring Skilled Therapeutic Intervention:  [x]Decreased strength     []Decreased ROM  [x]Decreased functional mobility  [x]Decreased balance   [x]Decreased endurance []Decreased posture  []Decreased sensation  [x]Decreased coordination   [x]Decreased vision  [x]Decreased safety awareness   []Increased pain       Comments:  Pt received sitting in chair and agreeable to PT session  Pt presents with R sided weakness. Able to stand from chair and commode without assistance although did require cueing for hand placement. Ambulating with no device pt unsteady and needed assist but improved with use of walker. Gait remained slow with limp during RLE weight bearing and poor foot clearance R secondary to foot drop. Pt seated in chair to end session   Pt with all needs met and call light in reach. Pt would benefit from continued PT POC to address functional deficits described above. Treatment:  Patient practiced and was instructed in the following treatment:     Patient education provided continuously throughout session for sequencing, safety maintenance, and improving any deficits found during the evaluation.  STS and pivot transfer training - pt educated on proper hand and foot placement, safety and sequencing, and use of WW to safely complete sit<>stand and pivot transfers with hands on assistance to complete task safely     Gait training- pt was given verbal and tactile cues to facilitate improved balance and R foot clearance during ambulation as well as provided with physical assistance to complete task. Pt's/ family goals   1. Return home    Patient and or family understand(s) diagnosis, prognosis, and plan of care. yes    Prognosis is fair for reaching above PT goals. PHYSICAL THERAPY PLAN OF CARE:    PT POC is established based on physician order and patient diagnosis     Referring provider/PT Order:    07/29/21 1100  PT eval and treat Start: 07/29/21 1100, End: 07/29/21 1100, ONE TIME, Standing Count: 1 Occurrences, R      Ivis Thy Catherine Esqueda MD     Diagnosis:  Stroke-like symptoms [R29.90]  Specific instructions for next treatment:  Progress ambulation. Stairs. Current Treatment Recommendations:   [x] Strengthening to improve independence with functional mobility   [] ROM to improve independence with functional mobility   [x] Balance Training to improve static/dynamic balance and to reduce fall risk  [x] Endurance Training to improve activity tolerance during functional mobility   [x] Transfer Training to improve safety and independence with all functional transfers   [x] Gait Training to improve gait mechanics, endurance and asses need for appropriate assistive device  [x] Stair Training in preparation for safe discharge home and/or into the community   [] Positioning to prevent skin breakdown and contractures  [] Safety and Education Training   [] Patient/Caregiver Education   [] HEP  [] Other     PT long term treatment goals are located in above grid    Frequency of treatments: 2-5x/week x 1-2 weeks. Time in  1320  Time out  1340    Total Treatment Time  10 minutes     Evaluation Time includes thorough review of current medical information, gathering information on past medical history/social history and prior level of function, completion of standardized testing/informal observation of tasks, assessment of data and education on plan of care and goals.     CPT codes:  [x] Low Complexity PT evaluation 34802  [] Moderate Complexity PT evaluation 15835  [] High Complexity PT evaluation 64634  [] PT Re-evaluation 26963  [] Gait training 73183 - minutes  [] Manual therapy 38432 - minutes  [x] Therapeutic activities 40688 10 minutes  [] Therapeutic exercises 03297 - minutes  [] Neuromuscular reeducation 45337 - minutes     Laney Bhatia, PT, DPT  [id number]

## 2021-07-29 NOTE — PROGRESS NOTES
[facility]  [facility] Medicine Residency Program  Progress Note - House Team     Patient:  Luci Morales 39 y.o. female MRN: [id number]     Date of Service: 7/29/2021     CC: headache with right-sided weakness  Overnight events: Persistent headache    Subjective     Patient was seen and examined this morning at bedside in no acute distress. Overnight, patient complains of persisting headache, which she describes as a band around her head. Patient also experienced new-onset decreased sensation on the full right side of her face but no difficulty with speech or swallowing. Patient passed her swallow test and has been started on gluten-dairy-red dye-free diet. Patient also complained of changes in hearing bilaterally described as noises being \"louder inside her ears. \" Patient has been able to sit up at a higher angle on her bed and was seen ambulating to the restroom with help from the nurse. Objective     Physical Exam:  · Vitals: /72   Pulse 76   Temp 98.3 °F (36.8 °C) (Oral)   Resp 16   Ht 5' 3\" (1.6 m)   Wt 206 lb (93.4 kg)   SpO2 95%   BMI 36.49 kg/m²     · I & O - 24hr: No intake/output data recorded. · General Appearance: alert, appears stated age, cooperative and no distress  · HEENT:  Head: Normal, normocephalic, atraumatic. · Neck: no carotid bruit, no JVD and supple, symmetrical, trachea midline  · Lung: clear to auscultation bilaterally  · Heart: regular rate and rhythm, S1, S2 normal, no murmur, click, rub or gallop  · Abdomen: soft, non-tender; bowel sounds normal; no masses,  no organomegaly  · Extremities:  extremities normal, atraumatic, no cyanosis or edema  · Musculokeletal: No joint swelling, no muscle tenderness. ROM normal in all joints of extremities.    · Neurologic: Mental status: Alert, oriented to person, place and time, thought content appropriate, pupils equally round and reactive to light, EOMs intact, no saccades, no RENE, intact gross hearing bilaterally, sensation to light touch decreased on Right upper and lower face, no facial asymmetry, eyebrow strength equal, tongue midline, uvula midline, SCM motor strength L >R, shoulder shrug L>R, lower extremity strength L > R, upper extremity L>R, Negative arm drift  Subject  Pertinent Labs & Imaging Studies   bill  CBC with Differential:    Lab Results   Component Value Date    WBC 4.0 07/29/2021    RBC 4.20 07/29/2021    RBC 4.31 03/11/2020    HGB 12.4 07/29/2021    HCT 40.5 07/29/2021     07/29/2021    MCV 96.4 07/29/2021    MCH 29.5 07/29/2021    MCHC 30.6 07/29/2021    RDW 15.4 07/29/2021    SEGSPCT 69.6 01/22/2021    LYMPHOPCT 59.8 07/29/2021    LYMPHOPCT 29.7 03/11/2020    MONOPCT 8.3 07/29/2021    BASOPCT 0.5 07/29/2021    MONOSABS 0.33 07/29/2021    LYMPHSABS 2.38 07/29/2021    EOSABS 0.03 07/29/2021    BASOSABS 0.02 07/29/2021     CMP:    Lab Results   Component Value Date     07/29/2021    K 3.7 07/29/2021     07/29/2021    CO2 28 07/29/2021    BUN 23 07/29/2021    CREATININE 0.8 07/29/2021    GFRAA >60 07/29/2021    AGRATIO 1.0 01/22/2021    LABGLOM >60 07/29/2021    GLUCOSE 103 07/29/2021    PROT 7.4 07/28/2021    LABALBU 3.8 07/28/2021    CALCIUM 8.4 07/29/2021    BILITOT 0.4 07/28/2021    ALKPHOS 42 07/28/2021    AST 19 07/28/2021    ALT 22 07/28/2021     HgBA1c:    Lab Results   Component Value Date    LABA1C 8.6 07/29/2021     FLP:    Lab Results   Component Value Date    TRIG 278 07/29/2021    HDL 36 07/29/2021    LDLCALC 56 07/29/2021    LABVLDL 56 07/29/2021       CT HEAD WO CONTRAST   Final Result   1. No acute intracranial abnormality. 2. No perfusion mismatch. 3. No acute abnormality or flow-limiting stenosis of the major arteries of   the head and neck. CTA NECK W CONTRAST   Final Result   1. No acute intracranial abnormality. 2. No perfusion mismatch. 3. No acute abnormality or flow-limiting stenosis of the major arteries of   the head and neck. CTA HEAD W CONTRAST   Final Result   1. No acute intracranial abnormality. 2. No perfusion mismatch. 3. No acute abnormality or flow-limiting stenosis of the major arteries of   the head and neck. CT BRAIN PERFUSION   Final Result   1. No acute intracranial abnormality. 2. No perfusion mismatch. 3. No acute abnormality or flow-limiting stenosis of the major arteries of   the head and neck. XR CHEST PORTABLE   Final Result   No radiographic evidence of acute abnormality. MRI BRAIN W WO CONTRAST    (Results Pending)        Resident's Assessment and Plan     Assessment and Plan:    1. Complex migraine with hemiplegia vs TIA   - Neuro consulted with plans for MRI with and without contrast, no plans for LP right now   - Continue toradol for headaches   - Continue benadryl, phenergen, Mg, propanolol and depakote    2. H/O of IDDM type 2   - A1C: 8.6    - LDSS   - Glucose checks AC/HS    3. Hypertriglyceridemia   - Triglycerides 278   - Started on lipitor 20 mg, patient took this medication previously    4. H/O hypogammaglobulinemia   - Receives monthly IVIG    5. Vision loss, L>R    6. H/O Sjogrens syndrome    7. H/O hypothyroidism    8.  H/O PAULINE      PT/OT evaluation: Eval and treat  DVT prophylaxis/ GI prophylaxis: Lovenox 40 mg, Protonix 40 mg  Disposition: continue current care     Carolin Pressley MD, PGY-1  Attending physician: Dr. Lelia Cortes

## 2021-07-29 NOTE — PROGRESS NOTES
[facility]  Internal Medicine Residency Program  Progress Note - House Team 2    Patient:  Luci Morales 39 y.o. female MRN: [id number]     Date of Service: 7/29/2021     CC: Fatigue, dizziness, headache, weakness on right side  Overnight events: Passed swallow test, started Toradol 15 mg for headache/pain     Subjective     Patient was alert and oriented, denied any shortness of breath or palpitations. She is still having headaches, fatigue, and weakness of the right side. There is new decreased sensation of her right face, and new pressure in her ears. Patient complained about continued migraine and asked for something other than tylenol, so 15 mg Toradol was ordered. Objective     Physical Exam:  · Vitals: BP (!) 113/58   Pulse 76   Temp 98 °F (36.7 °C) (Oral)   Resp 16   Ht 5' 3\" (1.6 m)   Wt 206 lb (93.4 kg)   SpO2 97%   BMI 36.49 kg/m²     · I & O - 24hr: No intake/output data recorded. · General Appearance: alert, appears stated age, cooperative, fatigued, mild distress and mildly obese  · HEENT:  Head: Normocephalic, no lesions, without obvious abnormality. · Eye: Blind in left eye  · Neck: no adenopathy, no carotid bruit, no JVD, supple, symmetrical, trachea midline and thyroid not enlarged, symmetric, no tenderness/mass/nodules  · Lung: clear to auscultation bilaterally  · Heart: regular rate and rhythm and S1, S2 normal  · Abdomen: soft, non-tender; bowel sounds normal; no masses,  no organomegaly  · Extremities:  extremities normal, atraumatic, no cyanosis or edema  · Musculokeletal: No joint swelling, no muscle tenderness. ROM normal in all joints of extremities. · Neurologic: Mental status: Alert, oriented, thought content appropriate. Sensory: Sensory deficit present in right upper and lower extremities, and right face. Motor: Weakness present in right upper and lower extremities.   Subject  Pertinent Labs & Imaging Studies   bill  CBC:   Lab Results   Component Value Date    WBC 4.0 07/29/2021    RBC 4.20 07/29/2021    RBC 4.31 03/11/2020    HGB 12.4 07/29/2021    HCT 40.5 07/29/2021    MCV 96.4 07/29/2021    MCH 29.5 07/29/2021    MCHC 30.6 07/29/2021    RDW 15.4 07/29/2021     07/29/2021    MPV 11.3 07/29/2021     WBC:    Lab Results   Component Value Date    WBC 4.0 07/29/2021     Hemoglobin/Hematocrit:    Lab Results   Component Value Date    HGB 12.4 07/29/2021    HCT 40.5 07/29/2021     BMP:    Lab Results   Component Value Date     07/29/2021    K 3.7 07/29/2021     07/29/2021    CO2 28 07/29/2021    BUN 23 07/29/2021    LABALBU 3.8 07/28/2021    CREATININE 0.8 07/29/2021    CALCIUM 8.4 07/29/2021    GFRAA >60 07/29/2021    LABGLOM >60 07/29/2021    GLUCOSE 103 07/29/2021     Imaging Studies:  CT HEAD WO CONTRAST   Final Result   1. No acute intracranial abnormality. 2. No perfusion mismatch. 3. No acute abnormality or flow-limiting stenosis of the major arteries of   the head and neck.           CTA NECK W CONTRAST   Final Result   1. No acute intracranial abnormality. 2. No perfusion mismatch. 3. No acute abnormality or flow-limiting stenosis of the major arteries of   the head and neck.           CTA HEAD W CONTRAST   Final Result   1. No acute intracranial abnormality. 2. No perfusion mismatch. 3. No acute abnormality or flow-limiting stenosis of the major arteries of   the head and neck.           CT BRAIN PERFUSION   Final Result   1. No acute intracranial abnormality. 2. No perfusion mismatch.    3. No acute abnormality or flow-limiting stenosis of the major arteries of   the head and neck.           XR CHEST PORTABLE   Final Result   No radiographic evidence of acute abnormality.                 EKG: Normal sinus rhythm  Possible Anterior infarct (cited on or before 28-JUL-2021)  Abnormal ECG  When compared with ECG of 15-JUL-2021 03:59,  No significant change was found      Resident's Assessment and Plan     Arabella Tran is a 39 y.o. female that presents with fatigue, dizziness, headache, and weakness on her right side. 1. Complex migraine w/ hemiplegia   A. Complex migraine history as noted   B. CT scans show no abnormalities   C. MRI planned   D. Consulted neurology   E. Continue pain control on toradol, benadryl, phenergan, Mg, propanolol, depakote  2. History of hypogammaglobinemia   A. Patient currently receiving IVIG once monthly and on Gammagard through immunologist at [facility]  3. Bilateral vision loss   A. Blind in left eye and impaired vision in right eye  4. Hx of IDDM   A. A1c at 10.9   B. Patient currently on Lantus 22 units in morning and 18 units in evening with 4-6 units Humalog with meals   C. Metformin  mg twice daily  5. Hx Sjogren's disease   A. Continue home meds  6. Hx of hypothyroidism   A. Continue home med levothyroxine  7.  Hx of PAULINE   A. CPAP at home    PT/OT evaluation:  DVT prophylaxis/ GI prophylaxis:   Disposition: home +/- home health / SNF / [facility] 12 / [facility], [place]  Attending physician: Dr. Kalpesh Arthur

## 2021-07-29 NOTE — CARE COORDINATION
Care Coordination: Received a call from [place] at [facility]. Updated her on patient. She states this seems to be a pattern with patient with new complaints.   Will notify her on discharge    Roberth Barahona

## 2021-07-29 NOTE — CARE COORDINATION
Met with pt and spouse at bedside. Pt lives with spouse in a 2+1 step to enter, 1 story home. Pt receives waiver services including 28hrs private caregivers, 7 home delivered meals and emergency response service. /milena from [facility] [phone]. Pt has hx of outpatient therapy. No reji/hh hx. Pt is agreeable to home health upon discharge if needed, will need orders. referral made to preferred provider [facility]. [facility] will follow, unable to start skill nursing services until 8/1-Sunday. Pt/ot ordered. Neuro consulted. Dr. Eduardo Wiley is pcp. [facility] for meds. Cm/ss will continue to follow for discharge needs.      Erica, [address] [place] [address]

## 2021-07-29 NOTE — PROGRESS NOTES
[facility]  Internal Medicine Residency / [address]    Attending Physician Statement  I have discussed the case, including pertinent history and exam findings with the resident and the team.  I have seen and examined the patient and the key elements of the encounter have been performed by me. I have also personally reviewed imaging studies and labs. I agree with the assessment, plan and orders as documented by the resident. Continues to have RUE and RLE weakness and with new concern for R sided facial numbness. Assessment:  1. Complex migraine with hemiplegia  2. Possible TIA  3. ?MS  4. DM, insulin requiring    Plan:  MRI brain  Risk stratification  PT/OT  Pain control  ASA and statin for stroke prevention        Remainder of medical problems as per resident note. Cuba Mcleod MD  Internal Medicine Residency Faculty

## 2021-07-30 VITALS
HEART RATE: 78 BPM | RESPIRATION RATE: 14 BRPM | DIASTOLIC BLOOD PRESSURE: 65 MMHG | SYSTOLIC BLOOD PRESSURE: 109 MMHG | HEIGHT: 63 IN | TEMPERATURE: 97.6 F | OXYGEN SATURATION: 95 % | BODY MASS INDEX: 36.5 KG/M2 | WEIGHT: 206 LBS

## 2021-07-30 LAB
ANION GAP SERPL CALCULATED.3IONS-SCNC: 11 MMOL/L (ref 7–16)
BASOPHILS ABSOLUTE: 0.02 E9/L (ref 0–0.2)
BASOPHILS RELATIVE PERCENT: 0.5 % (ref 0–2)
BUN BLDV-MCNC: 16 MG/DL (ref 6–20)
CALCIUM SERPL-MCNC: 8.4 MG/DL (ref 8.6–10.2)
CHLORIDE BLD-SCNC: 104 MMOL/L (ref 98–107)
CO2: 21 MMOL/L (ref 22–29)
CREAT SERPL-MCNC: 0.7 MG/DL (ref 0.5–1)
EOSINOPHILS ABSOLUTE: 0.05 E9/L (ref 0.05–0.5)
EOSINOPHILS RELATIVE PERCENT: 1.1 % (ref 0–6)
GFR AFRICAN AMERICAN: >60
GFR NON-AFRICAN AMERICAN: >60 ML/MIN/1.73
GLUCOSE BLD-MCNC: 188 MG/DL (ref 74–99)
HCT VFR BLD CALC: 37.6 % (ref 34–48)
HEMOGLOBIN: 11.8 G/DL (ref 11.5–15.5)
IMMATURE GRANULOCYTES #: 0.05 E9/L
IMMATURE GRANULOCYTES %: 1.1 % (ref 0–5)
LYMPHOCYTES ABSOLUTE: 2.13 E9/L (ref 1.5–4)
LYMPHOCYTES RELATIVE PERCENT: 48.2 % (ref 20–42)
MCH RBC QN AUTO: 29.7 PG (ref 26–35)
MCHC RBC AUTO-ENTMCNC: 31.4 % (ref 32–34.5)
MCV RBC AUTO: 94.7 FL (ref 80–99.9)
METER GLUCOSE: 198 MG/DL (ref 74–99)
METER GLUCOSE: 273 MG/DL (ref 74–99)
MONOCYTES ABSOLUTE: 0.38 E9/L (ref 0.1–0.95)
MONOCYTES RELATIVE PERCENT: 8.6 % (ref 2–12)
NEUTROPHILS ABSOLUTE: 1.79 E9/L (ref 1.8–7.3)
NEUTROPHILS RELATIVE PERCENT: 40.5 % (ref 43–80)
PDW BLD-RTO: 14.8 FL (ref 11.5–15)
PLATELET # BLD: 192 E9/L (ref 130–450)
PMV BLD AUTO: 10.9 FL (ref 7–12)
POTASSIUM REFLEX MAGNESIUM: 4 MMOL/L (ref 3.5–5)
RBC # BLD: 3.97 E12/L (ref 3.5–5.5)
SODIUM BLD-SCNC: 136 MMOL/L (ref 132–146)
WBC # BLD: 4.4 E9/L (ref 4.5–11.5)

## 2021-07-30 PROCEDURE — 96372 THER/PROPH/DIAG INJ SC/IM: CPT

## 2021-07-30 PROCEDURE — 6360000002 HC RX W HCPCS: Performed by: INTERNAL MEDICINE

## 2021-07-30 PROCEDURE — 80048 BASIC METABOLIC PNL TOTAL CA: CPT

## 2021-07-30 PROCEDURE — 2580000003 HC RX 258: Performed by: INTERNAL MEDICINE

## 2021-07-30 PROCEDURE — 97165 OT EVAL LOW COMPLEX 30 MIN: CPT

## 2021-07-30 PROCEDURE — 36415 COLL VENOUS BLD VENIPUNCTURE: CPT

## 2021-07-30 PROCEDURE — 97535 SELF CARE MNGMENT TRAINING: CPT

## 2021-07-30 PROCEDURE — 94640 AIRWAY INHALATION TREATMENT: CPT

## 2021-07-30 PROCEDURE — 99232 SBSQ HOSP IP/OBS MODERATE 35: CPT | Performed by: NURSE PRACTITIONER

## 2021-07-30 PROCEDURE — 82962 GLUCOSE BLOOD TEST: CPT

## 2021-07-30 PROCEDURE — 6360000002 HC RX W HCPCS: Performed by: NURSE PRACTITIONER

## 2021-07-30 PROCEDURE — 6370000000 HC RX 637 (ALT 250 FOR IP): Performed by: INTERNAL MEDICINE

## 2021-07-30 PROCEDURE — 85025 COMPLETE CBC W/AUTO DIFF WBC: CPT

## 2021-07-30 PROCEDURE — G0378 HOSPITAL OBSERVATION PER HR: HCPCS

## 2021-07-30 PROCEDURE — 6370000000 HC RX 637 (ALT 250 FOR IP): Performed by: STUDENT IN AN ORGANIZED HEALTH CARE EDUCATION/TRAINING PROGRAM

## 2021-07-30 PROCEDURE — 96376 TX/PRO/DX INJ SAME DRUG ADON: CPT

## 2021-07-30 PROCEDURE — 6360000002 HC RX W HCPCS: Performed by: STUDENT IN AN ORGANIZED HEALTH CARE EDUCATION/TRAINING PROGRAM

## 2021-07-30 PROCEDURE — 99239 HOSP IP/OBS DSCHRG MGMT >30: CPT | Performed by: INTERNAL MEDICINE

## 2021-07-30 PROCEDURE — 97530 THERAPEUTIC ACTIVITIES: CPT

## 2021-07-30 RX ORDER — DIPHENHYDRAMINE HYDROCHLORIDE 50 MG/ML
25 INJECTION INTRAMUSCULAR; INTRAVENOUS ONCE
Status: COMPLETED | OUTPATIENT
Start: 2021-07-30 | End: 2021-07-30

## 2021-07-30 RX ORDER — ATORVASTATIN CALCIUM 20 MG/1
20 TABLET, FILM COATED ORAL NIGHTLY
Qty: 30 TABLET | Refills: 3 | Status: SHIPPED | OUTPATIENT
Start: 2021-07-30 | End: 2021-08-30

## 2021-07-30 RX ORDER — HEPARIN SODIUM (PORCINE) LOCK FLUSH IV SOLN 100 UNIT/ML 100 UNIT/ML
500 SOLUTION INTRAVENOUS PRN
Status: COMPLETED | OUTPATIENT
Start: 2021-07-30 | End: 2021-07-30

## 2021-07-30 RX ORDER — KETOROLAC TROMETHAMINE 30 MG/ML
30 INJECTION, SOLUTION INTRAMUSCULAR; INTRAVENOUS ONCE
Status: COMPLETED | OUTPATIENT
Start: 2021-07-30 | End: 2021-07-30

## 2021-07-30 RX ORDER — LEVOTHYROXINE SODIUM 0.05 MG/1
125 TABLET ORAL
Status: DISCONTINUED | OUTPATIENT
Start: 2021-08-02 | End: 2021-07-30 | Stop reason: HOSPADM

## 2021-07-30 RX ORDER — LEVOTHYROXINE SODIUM 0.05 MG/1
100 TABLET ORAL
Status: DISCONTINUED | OUTPATIENT
Start: 2021-07-31 | End: 2021-07-30 | Stop reason: HOSPADM

## 2021-07-30 RX ADMIN — INSULIN LISPRO 6 UNITS: 100 INJECTION, SOLUTION INTRAVENOUS; SUBCUTANEOUS at 12:09

## 2021-07-30 RX ADMIN — ENOXAPARIN SODIUM 40 MG: 100 INJECTION SUBCUTANEOUS at 08:42

## 2021-07-30 RX ADMIN — BUDESONIDE 1000 MCG: 0.5 SUSPENSION RESPIRATORY (INHALATION) at 09:58

## 2021-07-30 RX ADMIN — DIVALPROEX SODIUM 500 MG: 500 TABLET, EXTENDED RELEASE ORAL at 08:43

## 2021-07-30 RX ADMIN — IBUPROFEN 400 MG: 400 TABLET, FILM COATED ORAL at 08:46

## 2021-07-30 RX ADMIN — ACETAMINOPHEN 650 MG: 325 TABLET ORAL at 01:20

## 2021-07-30 RX ADMIN — PROMETHAZINE HYDROCHLORIDE 25 MG: 25 TABLET ORAL at 08:44

## 2021-07-30 RX ADMIN — ROPINIROLE HYDROCHLORIDE 0.5 MG: 0.5 TABLET, FILM COATED ORAL at 08:43

## 2021-07-30 RX ADMIN — INSULIN LISPRO 2 UNITS: 100 INJECTION, SOLUTION INTRAVENOUS; SUBCUTANEOUS at 06:31

## 2021-07-30 RX ADMIN — FOLIC ACID 1 MG: 1 TABLET ORAL at 12:54

## 2021-07-30 RX ADMIN — PROMETHAZINE HYDROCHLORIDE 25 MG: 25 TABLET ORAL at 01:20

## 2021-07-30 RX ADMIN — SODIUM CHLORIDE, PRESERVATIVE FREE 500 UNITS: 5 INJECTION INTRAVENOUS at 13:44

## 2021-07-30 RX ADMIN — ARFORMOTEROL TARTRATE 15 MCG: 15 SOLUTION RESPIRATORY (INHALATION) at 09:58

## 2021-07-30 RX ADMIN — INSULIN GLARGINE 4 UNITS: 100 INJECTION, SOLUTION SUBCUTANEOUS at 08:47

## 2021-07-30 RX ADMIN — DIPHENHYDRAMINE HYDROCHLORIDE 25 MG: 50 INJECTION INTRAMUSCULAR; INTRAVENOUS at 08:59

## 2021-07-30 RX ADMIN — PANTOPRAZOLE SODIUM 40 MG: 40 TABLET, DELAYED RELEASE ORAL at 05:50

## 2021-07-30 RX ADMIN — FUROSEMIDE 20 MG: 20 TABLET ORAL at 08:44

## 2021-07-30 RX ADMIN — KETOROLAC TROMETHAMINE 30 MG: 30 INJECTION, SOLUTION INTRAMUSCULAR; INTRAVENOUS at 12:08

## 2021-07-30 RX ADMIN — Medication 400 MG: at 08:44

## 2021-07-30 RX ADMIN — PROPRANOLOL HYDROCHLORIDE 10 MG: 10 TABLET ORAL at 08:43

## 2021-07-30 RX ADMIN — ASPIRIN 81 MG: 81 TABLET, COATED ORAL at 08:43

## 2021-07-30 RX ADMIN — LEVOTHYROXINE SODIUM 125 MCG: 0.12 TABLET ORAL at 05:50

## 2021-07-30 RX ADMIN — Medication 10 ML: at 08:42

## 2021-07-30 ASSESSMENT — PAIN SCALES - GENERAL
PAINLEVEL_OUTOF10: 0
PAINLEVEL_OUTOF10: 3
PAINLEVEL_OUTOF10: 2
PAINLEVEL_OUTOF10: 7

## 2021-07-30 ASSESSMENT — PAIN DESCRIPTION - PAIN TYPE: TYPE: ACUTE PAIN

## 2021-07-30 ASSESSMENT — PAIN DESCRIPTION - FREQUENCY: FREQUENCY: CONTINUOUS

## 2021-07-30 ASSESSMENT — PAIN DESCRIPTION - LOCATION: LOCATION: HEAD

## 2021-07-30 ASSESSMENT — PAIN DESCRIPTION - PROGRESSION: CLINICAL_PROGRESSION: NOT CHANGED

## 2021-07-30 ASSESSMENT — PAIN - FUNCTIONAL ASSESSMENT: PAIN_FUNCTIONAL_ASSESSMENT: ACTIVITIES ARE NOT PREVENTED

## 2021-07-30 ASSESSMENT — PAIN DESCRIPTION - DESCRIPTORS: DESCRIPTORS: HEADACHE

## 2021-07-30 ASSESSMENT — PAIN DESCRIPTION - ONSET: ONSET: ON-GOING

## 2021-07-30 NOTE — PROGRESS NOTES
Pt IV and tele removed. Pt belongings gathered and verbalizes understanding of DC paperwork. Pt left floor via wheelchair.

## 2021-07-30 NOTE — PROGRESS NOTES
CLINICAL PHARMACY NOTE: MEDS TO BEDS    Total # of Prescriptions Filled: 1   The following medications were delivered to the patient:  · lipitor     Additional Documentation:

## 2021-07-30 NOTE — PROGRESS NOTES
[facility]  Progress Note - House Team     Patient:  Vielka Gupta 39 y.o. female MRN: [id number]     Date of Service: 2021     CC: headache and R sided weakness  Overnight events: none     Subjective     Patient was seen and examined this morning at bedside in no acute distress. Patient stated that she felt a little shaky overnight after the MRI however her symptoms resolved quickly. She states she still has a headache before however it is getting better. She is also stating that her weakness is greatly improved and she is now able to bear weight on her R leg. She denies any SOB, CP, N/V. Objective     Physical Exam:  TEMPERATURE:  Current - Temp: 98 °F (36.7 °C); Max - Temp  Av.2 °F (36.8 °C)  Min: 98 °F (36.7 °C)  Max: 98.3 °F (36.8 °C)  RESPIRATIONS RANGE: Resp  Av  Min: 16  Max: 16  PULSE RANGE: Pulse  Av.8  Min: 76  Max: 81  BLOOD PRESSURE RANGE:  Systolic (96FGM), XHY:137 , Min:113 , MRU:584   ; Diastolic (49GIH), BKB:79, Min:58, Max:75    PULSE OXIMETRY RANGE: SpO2  Av.3 %  Min: 95 %  Max: 97 %    I & O - 24hr:    Intake/Output Summary (Last 24 hours) at 2021 0753  Last data filed at 2021 0443  Gross per 24 hour   Intake 1000 ml   Output    Net 1000 ml     I/O last 3 completed shifts: In: 1000 [P.O.:1000]  Out: -  No intake/output data recorded. Weight change:     Physical Exam  Constitutional:       General: She is not in acute distress. Appearance: She is well-developed. She is not diaphoretic. HENT:      Head: Normocephalic and atraumatic. Eyes:      General: No scleral icterus. Pupils: Pupils are equal, round, and reactive to light. Neck:      Thyroid: No thyromegaly. Vascular: No JVD. Trachea: No tracheal deviation. Cardiovascular:      Rate and Rhythm: Normal rate and regular rhythm. Heart sounds: Normal heart sounds. No murmur heard. No friction rub. No gallop. Pulmonary:      Effort: Pulmonary effort is normal. No respiratory distress. Breath sounds: Normal breath sounds. No wheezing or rales. Abdominal:      General: Bowel sounds are normal. There is no distension. Palpations: Abdomen is soft. There is no mass. Tenderness: There is no abdominal tenderness. There is no guarding or rebound. Musculoskeletal:         General: Normal range of motion. Right lower leg: No edema. Left lower leg: No edema. Skin:     General: Skin is warm and dry. Capillary Refill: Capillary refill takes less than 2 seconds. Coloration: Skin is not pale. Findings: No rash. Comments: Port R chest   Neurological:      Mental Status: She is alert and oriented to person, place, and time. Motor: Weakness (Right sided ) present. Comments: Decreased visual acuity. L>R     Psychiatric:         Behavior: Behavior normal.         Thought Content:  Thought content normal.         Judgment: Judgment normal.       Subject  Pertinent Labs & Imaging Studies   bill  CBC:   Recent Labs     07/28/21  0748 07/29/21  0635   WBC 5.1 4.0*   HGB 12.1 12.4   HCT 37.3 40.5   MCV 92.6 96.4    175       BMP:    Recent Labs     07/28/21  0748 07/29/21  0635    143   K 4.1 3.7    106   CO2 26 28   BUN 19 23*   CREATININE 0.7 0.8   GLUCOSE 186* 103*       LIVER PROFILE:   Recent Labs     07/28/21  0748   AST 19   ALT 22   LIPASE 54   BILITOT 0.4   ALKPHOS 42       PT/INR:   Recent Labs     07/28/21  0748   PROTIME 10.9   INR 1.0       APTT:   Recent Labs     07/28/21  0748   APTT 39.8*       Fasting Lipid Panel:    Lab Results   Component Value Date    CHOL 148 07/29/2021    TRIG 278 07/29/2021    HDL 36 07/29/2021       Notable Cultures:      Blood cultures   Blood Culture, Routine   Date Value Ref Range Status   10/08/2020 5 Days no growth  Final     Respiratory cultures No results found for: RESPCULTURE No results found for: LABGRAM  Urine Urine Culture, Routine   Date Value Ref Range Status   07/14/2021   Final    10 to 100,000 CFU/mL  Mixed nemo isolated. Further workup and sensitivity testing  is not routinely indicated and will not be performed. Mixed nemo isolated includes:  Mixed gram positive organisms       Legionella No results found for: LABLEGI  C Diff PCR No results found for: CDIFPCR  Wound culture/abscess: No results for input(s): WNDABS in the last 72 hours. Tip culture:No results for input(s): CXCATHTIP in the last 72 hours. CT HEAD WO CONTRAST    Result Date: 7/28/2021  EXAMINATION: CTA OF THE HEAD WITH CONTRAST; CTA OF THE HEAD WITH CONTRAST WITH PERFUSION; CTA OF THE NECK; CT OF THE HEAD WITHOUT CONTRAST 7/28/2021 9:47 am: TECHNIQUE: CTA of the head/brain was performed with the administration of intravenous contrast. Multiplanar reformatted images are provided for review. MIP images are provided for review. Dose modulation, iterative reconstruction, and/or weight based adjustment of the mA/kV was utilized to reduce the radiation dose to as low as reasonably achievable.; CTA of the neck was performed with the administration of intravenous contrast. Multiplanar reformatted images are provided for review. MIP images are provided for review. Stenosis of the internal carotid arteries measured using NASCET criteria. Dose modulation, iterative reconstruction, and/or weight based adjustment of the mA/kV was utilized to reduce the radiation dose to as low as reasonably achievable.; CT of the head was performed without the administration of intravenous contrast. Dose modulation, iterative reconstruction, and/or weight based adjustment of the mA/kV was utilized to reduce the radiation dose to as low as reasonably achievable. Noncontrast CT of the head with reconstructed 2-D images are also provided for review.  COMPARISON: CT head July 15, 2021, CT head July 14, 2021 HISTORY: ORDERING SYSTEM PROVIDED HISTORY: right arm and leg weakness TECHNOLOGIST PROVIDED HISTORY: Reason for exam:->right arm and leg weakness Has a \"code stroke\" or \"stroke alert\" been called? ->No Decision Support Exception - unselect if not a suspected or confirmed emergency medical condition->Emergency Medical Condition ([place]) What reading provider will be dictating this exam?->CRC; ORDERING SYSTEM PROVIDED HISTORY: right arm and leg weakness TECHNOLOGIST PROVIDED HISTORY: Reason for exam:->right arm and leg weakness Decision Support Exception - unselect if not a suspected or confirmed emergency medical condition->Emergency Medical Condition ([place]) What reading provider will be dictating this exam?->[facility] FINDINGS: CT HEAD: BRAIN/VENTRICLES:  No acute intracranial hemorrhage or extraaxial fluid collection. Grey-white differentiation is maintained. No evidence of mass, mass effect or midline shift. No evidence of hydrocephalus. ORBITS: The visualized portion of the orbits demonstrate no acute abnormality. SINUSES:  The visualized paranasal sinuses and mastoid air cells demonstrate no acute abnormality. SOFT TISSUES/SKULL: No acute abnormality of the visualized skull or soft tissues. CTA NECK: AORTIC ARCH/ARCH VESSELS: There is retroesophageal course of right subclavian artery, normal variant. No dissection or arterial injury. No significant stenosis of the brachiocephalic or subclavian arteries. CAROTID ARTERIES: No dissection, arterial injury, or hemodynamically significant stenosis by NASCET criteria. VERTEBRAL ARTERIES: No dissection, arterial injury, or significant stenosis. SOFT TISSUES: There are airspace opacities at the lung apices, likely related to underdistention versus pneumonitis. No cervical or superior mediastinal lymphadenopathy. The larynx and pharynx are unremarkable. No acute abnormality of the salivary and thyroid glands. BONES: No acute osseous abnormality.  CTA HEAD: ANTERIOR CIRCULATION: No significant stenosis of the intracranial internal carotid, anterior cerebral, or middle cerebral arteries. No aneurysm. POSTERIOR CIRCULATION: No significant stenosis of the vertebral, basilar, or posterior cerebral arteries. No aneurysm. OTHER: No dural venous sinus thrombosis on this non-dedicated study. CT PERFUSION: EXAM QUALITY: The examination is diagnostic with appropriate arterial inflow and venous outflow curves, and diagnostic perfusion maps. CORE INFARCT: The total area of ischemic core is 0 mL (CBF<30% volume). TOTAL HYPOPERFUSION: The total area of hypoperfusion is 0 mL (Tmax>6s volume). PENUMBRA: The penumbra (mismatch) volume is 0 mL and is located in the n/a. The mismatch ratio is n/a.     1. No acute intracranial abnormality. 2. No perfusion mismatch. 3. No acute abnormality or flow-limiting stenosis of the major arteries of the head and neck. CT Head WO Contrast    Result Date: 7/14/2021  EXAMINATION: CT OF THE HEAD WITHOUT CONTRAST  7/14/2021 12:25 pm TECHNIQUE: CT of the head was performed without the administration of intravenous contrast. Dose modulation, iterative reconstruction, and/or weight based adjustment of the mA/kV was utilized to reduce the radiation dose to as low as reasonably achievable. COMPARISON: None. HISTORY: ORDERING SYSTEM PROVIDED HISTORY: headaches,gait instability TECHNOLOGIST PROVIDED HISTORY: Reason for exam:->headaches,gait instability Has a \"code stroke\" or \"stroke alert\" been called? ->No Decision Support Exception - unselect if not a suspected or confirmed emergency medical condition->Emergency Medical Condition ([place]) FINDINGS: BRAIN/VENTRICLES: There is no acute intracranial hemorrhage, mass effect or midline shift. No abnormal extra-axial fluid collection. The gray-white differentiation is maintained without evidence of an acute infarct. There is no evidence of hydrocephalus. ORBITS: The visualized portion of the orbits demonstrate no acute abnormality.  SINUSES: The visualized paranasal sinuses and mastoid air cells infiltrate, effusion, or pneumothorax. No acute osseous abnormality. Stable position of right MediPort catheter. No radiographic evidence of acute abnormality. CTA NECK W CONTRAST    Result Date: 7/28/2021  EXAMINATION: CTA OF THE HEAD WITH CONTRAST; CTA OF THE HEAD WITH CONTRAST WITH PERFUSION; CTA OF THE NECK; CT OF THE HEAD WITHOUT CONTRAST 7/28/2021 9:47 am: TECHNIQUE: CTA of the head/brain was performed with the administration of intravenous contrast. Multiplanar reformatted images are provided for review. MIP images are provided for review. Dose modulation, iterative reconstruction, and/or weight based adjustment of the mA/kV was utilized to reduce the radiation dose to as low as reasonably achievable.; CTA of the neck was performed with the administration of intravenous contrast. Multiplanar reformatted images are provided for review. MIP images are provided for review. Stenosis of the internal carotid arteries measured using NASCET criteria. Dose modulation, iterative reconstruction, and/or weight based adjustment of the mA/kV was utilized to reduce the radiation dose to as low as reasonably achievable.; CT of the head was performed without the administration of intravenous contrast. Dose modulation, iterative reconstruction, and/or weight based adjustment of the mA/kV was utilized to reduce the radiation dose to as low as reasonably achievable. Noncontrast CT of the head with reconstructed 2-D images are also provided for review. COMPARISON: CT head July 15, 2021, CT head July 14, 2021 HISTORY: ORDERING SYSTEM PROVIDED HISTORY: right arm and leg weakness TECHNOLOGIST PROVIDED HISTORY: Reason for exam:->right arm and leg weakness Has a \"code stroke\" or \"stroke alert\" been called? ->No Decision Support Exception - unselect if not a suspected or confirmed emergency medical condition->Emergency Medical Condition ([place]) What reading provider will be dictating this exam?->[facility]; [address] [place] [address] HISTORY: right arm and leg weakness TECHNOLOGIST PROVIDED HISTORY: Reason for exam:->right arm and leg weakness Decision Support Exception - unselect if not a suspected or confirmed emergency medical condition->Emergency Medical Condition ([place]) What reading provider will be dictating this exam?->CRC FINDINGS: CT HEAD: BRAIN/VENTRICLES:  No acute intracranial hemorrhage or extraaxial fluid collection. Grey-white differentiation is maintained. No evidence of mass, mass effect or midline shift. No evidence of hydrocephalus. ORBITS: The visualized portion of the orbits demonstrate no acute abnormality. SINUSES:  The visualized paranasal sinuses and mastoid air cells demonstrate no acute abnormality. SOFT TISSUES/SKULL: No acute abnormality of the visualized skull or soft tissues. CTA NECK: AORTIC ARCH/ARCH VESSELS: There is retroesophageal course of right subclavian artery, normal variant. No dissection or arterial injury. No significant stenosis of the brachiocephalic or subclavian arteries. CAROTID ARTERIES: No dissection, arterial injury, or hemodynamically significant stenosis by NASCET criteria. VERTEBRAL ARTERIES: No dissection, arterial injury, or significant stenosis. SOFT TISSUES: There are airspace opacities at the lung apices, likely related to underdistention versus pneumonitis. No cervical or superior mediastinal lymphadenopathy. The larynx and pharynx are unremarkable. No acute abnormality of the salivary and thyroid glands. BONES: No acute osseous abnormality. CTA HEAD: ANTERIOR CIRCULATION: No significant stenosis of the intracranial internal carotid, anterior cerebral, or middle cerebral arteries. No aneurysm. POSTERIOR CIRCULATION: No significant stenosis of the vertebral, basilar, or posterior cerebral arteries. No aneurysm. OTHER: No dural venous sinus thrombosis on this non-dedicated study.  CT PERFUSION: EXAM QUALITY: The examination is diagnostic with appropriate arterial inflow and venous outflow curves, and diagnostic perfusion maps. CORE INFARCT: The total area of ischemic core is 0 mL (CBF<30% volume). TOTAL HYPOPERFUSION: The total area of hypoperfusion is 0 mL (Tmax>6s volume). PENUMBRA: The penumbra (mismatch) volume is 0 mL and is located in the n/a. The mismatch ratio is n/a.     1. No acute intracranial abnormality. 2. No perfusion mismatch. 3. No acute abnormality or flow-limiting stenosis of the major arteries of the head and neck. CT BRAIN PERFUSION    Result Date: 7/28/2021  EXAMINATION: CTA OF THE HEAD WITH CONTRAST; CTA OF THE HEAD WITH CONTRAST WITH PERFUSION; CTA OF THE NECK; CT OF THE HEAD WITHOUT CONTRAST 7/28/2021 9:47 am: TECHNIQUE: CTA of the head/brain was performed with the administration of intravenous contrast. Multiplanar reformatted images are provided for review. MIP images are provided for review. Dose modulation, iterative reconstruction, and/or weight based adjustment of the mA/kV was utilized to reduce the radiation dose to as low as reasonably achievable.; CTA of the neck was performed with the administration of intravenous contrast. Multiplanar reformatted images are provided for review. MIP images are provided for review. Stenosis of the internal carotid arteries measured using NASCET criteria. Dose modulation, iterative reconstruction, and/or weight based adjustment of the mA/kV was utilized to reduce the radiation dose to as low as reasonably achievable.; CT of the head was performed without the administration of intravenous contrast. Dose modulation, iterative reconstruction, and/or weight based adjustment of the mA/kV was utilized to reduce the radiation dose to as low as reasonably achievable. Noncontrast CT of the head with reconstructed 2-D images are also provided for review.  COMPARISON: CT head July 15, 2021, CT head July 14, 2021 HISTORY: ORDERING SYSTEM PROVIDED HISTORY: right arm and leg weakness TECHNOLOGIST PROVIDED HISTORY: Reason for exam:->right arm and leg weakness Has a \"code stroke\" or \"stroke alert\" been called? ->No Decision Support Exception - unselect if not a suspected or confirmed emergency medical condition->Emergency Medical Condition (MA) What reading provider will be dictating this exam?->CRC; ORDERING SYSTEM PROVIDED HISTORY: right arm and leg weakness TECHNOLOGIST PROVIDED HISTORY: Reason for exam:->right arm and leg weakness Decision Support Exception - unselect if not a suspected or confirmed emergency medical condition->Emergency Medical Condition (MA) What reading provider will be dictating this exam?->CRC FINDINGS: CT HEAD: BRAIN/VENTRICLES:  No acute intracranial hemorrhage or extraaxial fluid collection. Grey-white differentiation is maintained. No evidence of mass, mass effect or midline shift. No evidence of hydrocephalus. ORBITS: The visualized portion of the orbits demonstrate no acute abnormality. SINUSES:  The visualized paranasal sinuses and mastoid air cells demonstrate no acute abnormality. SOFT TISSUES/SKULL: No acute abnormality of the visualized skull or soft tissues. CTA NECK: AORTIC ARCH/ARCH VESSELS: There is retroesophageal course of right subclavian artery, normal variant. No dissection or arterial injury. No significant stenosis of the brachiocephalic or subclavian arteries. CAROTID ARTERIES: No dissection, arterial injury, or hemodynamically significant stenosis by NASCET criteria. VERTEBRAL ARTERIES: No dissection, arterial injury, or significant stenosis. SOFT TISSUES: There are airspace opacities at the lung apices, likely related to underdistention versus pneumonitis. No cervical or superior mediastinal lymphadenopathy. The larynx and pharynx are unremarkable. No acute abnormality of the salivary and thyroid glands. BONES: No acute osseous abnormality. CTA HEAD: ANTERIOR CIRCULATION: No significant stenosis of the intracranial internal carotid, anterior cerebral, or middle cerebral arteries.  No aneurysm. POSTERIOR CIRCULATION: No significant stenosis of the vertebral, basilar, or posterior cerebral arteries. No aneurysm. OTHER: No dural venous sinus thrombosis on this non-dedicated study. CT PERFUSION: EXAM QUALITY: The examination is diagnostic with appropriate arterial inflow and venous outflow curves, and diagnostic perfusion maps. CORE INFARCT: The total area of ischemic core is 0 mL (CBF<30% volume). TOTAL HYPOPERFUSION: The total area of hypoperfusion is 0 mL (Tmax>6s volume). PENUMBRA: The penumbra (mismatch) volume is 0 mL and is located in the n/a. The mismatch ratio is n/a.     1. No acute intracranial abnormality. 2. No perfusion mismatch. 3. No acute abnormality or flow-limiting stenosis of the major arteries of the head and neck. CTA HEAD W CONTRAST    Result Date: 7/28/2021  EXAMINATION: CTA OF THE HEAD WITH CONTRAST; CTA OF THE HEAD WITH CONTRAST WITH PERFUSION; CTA OF THE NECK; CT OF THE HEAD WITHOUT CONTRAST 7/28/2021 9:47 am: TECHNIQUE: CTA of the head/brain was performed with the administration of intravenous contrast. Multiplanar reformatted images are provided for review. MIP images are provided for review. Dose modulation, iterative reconstruction, and/or weight based adjustment of the mA/kV was utilized to reduce the radiation dose to as low as reasonably achievable.; CTA of the neck was performed with the administration of intravenous contrast. Multiplanar reformatted images are provided for review. MIP images are provided for review. Stenosis of the internal carotid arteries measured using NASCET criteria.  Dose modulation, iterative reconstruction, and/or weight based adjustment of the mA/kV was utilized to reduce the radiation dose to as low as reasonably achievable.; CT of the head was performed without the administration of intravenous contrast. Dose modulation, iterative reconstruction, and/or weight based adjustment of the mA/kV was utilized to reduce the radiation dose to as low as reasonably achievable. Noncontrast CT of the head with reconstructed 2-D images are also provided for review. COMPARISON: CT head July 15, 2021, CT head July 14, 2021 HISTORY: ORDERING SYSTEM PROVIDED HISTORY: right arm and leg weakness TECHNOLOGIST PROVIDED HISTORY: Reason for exam:->right arm and leg weakness Has a \"code stroke\" or \"stroke alert\" been called? ->No Decision Support Exception - unselect if not a suspected or confirmed emergency medical condition->Emergency Medical Condition (MA) What reading provider will be dictating this exam?->CRC; ORDERING SYSTEM PROVIDED HISTORY: right arm and leg weakness TECHNOLOGIST PROVIDED HISTORY: Reason for exam:->right arm and leg weakness Decision Support Exception - unselect if not a suspected or confirmed emergency medical condition->Emergency Medical Condition (MA) What reading provider will be dictating this exam?->CRC FINDINGS: CT HEAD: BRAIN/VENTRICLES:  No acute intracranial hemorrhage or extraaxial fluid collection. Grey-white differentiation is maintained. No evidence of mass, mass effect or midline shift. No evidence of hydrocephalus. ORBITS: The visualized portion of the orbits demonstrate no acute abnormality. SINUSES:  The visualized paranasal sinuses and mastoid air cells demonstrate no acute abnormality. SOFT TISSUES/SKULL: No acute abnormality of the visualized skull or soft tissues. CTA NECK: AORTIC ARCH/ARCH VESSELS: There is retroesophageal course of right subclavian artery, normal variant. No dissection or arterial injury. No significant stenosis of the brachiocephalic or subclavian arteries. CAROTID ARTERIES: No dissection, arterial injury, or hemodynamically significant stenosis by NASCET criteria. VERTEBRAL ARTERIES: No dissection, arterial injury, or significant stenosis. SOFT TISSUES: There are airspace opacities at the lung apices, likely related to underdistention versus pneumonitis.   No cervical or superior mediastinal lymphadenopathy. The larynx and pharynx are unremarkable. No acute abnormality of the salivary and thyroid glands. BONES: No acute osseous abnormality. CTA HEAD: ANTERIOR CIRCULATION: No significant stenosis of the intracranial internal carotid, anterior cerebral, or middle cerebral arteries. No aneurysm. POSTERIOR CIRCULATION: No significant stenosis of the vertebral, basilar, or posterior cerebral arteries. No aneurysm. OTHER: No dural venous sinus thrombosis on this non-dedicated study. CT PERFUSION: EXAM QUALITY: The examination is diagnostic with appropriate arterial inflow and venous outflow curves, and diagnostic perfusion maps. CORE INFARCT: The total area of ischemic core is 0 mL (CBF<30% volume). TOTAL HYPOPERFUSION: The total area of hypoperfusion is 0 mL (Tmax>6s volume). PENUMBRA: The penumbra (mismatch) volume is 0 mL and is located in the n/a. The mismatch ratio is n/a.     1. No acute intracranial abnormality. 2. No perfusion mismatch. 3. No acute abnormality or flow-limiting stenosis of the major arteries of the head and neck. CTA HEAD W CONTRAST    Result Date: 7/15/2021  EXAMINATION: CTA OF THE HEAD WITH CONTRAST 7/15/2021 4:38 am: TECHNIQUE: CTA of the head/brain was performed with the administration of intravenous contrast. Multiplanar reformatted images are provided for review. MIP images are provided for review. Dose modulation, iterative reconstruction, and/or weight based adjustment of the mA/kV was utilized to reduce the radiation dose to as low as reasonably achievable. COMPARISON: None. HISTORY: ORDERING SYSTEM PROVIDED HISTORY: [facility] TECHNOLOGIST PROVIDED HISTORY: Reason for exam:->[facility] Has a \"code stroke\" or \"stroke alert\" been called? ->No Decision Support Exception - unselect if not a suspected or confirmed emergency medical condition->Emergency Medical Condition ([place]) What reading provider will be dictating this exam?->CRC FINDINGS: ANTERIOR CIRCULATION: No significant stenosis of the intracranial internal carotid, anterior cerebral, or middle cerebral arteries. No aneurysm. POSTERIOR CIRCULATION: No significant stenosis of the vertebral, basilar, or posterior cerebral arteries. No aneurysm. OTHER: No dural venous sinus thrombosis on this non-dedicated study. BRAIN: No mass effect or midline shift. No extra-axial fluid collection. The gray-white differentiation is maintained. Unremarkable CTA of the head. MRI BRAIN W WO CONTRAST    Result Date: 7/29/2021  EXAMINATION: MRI OF THE BRAIN WITHOUT AND WITH CONTRAST  7/29/2021 9:10 pm TECHNIQUE: Multiplanar multisequence MRI of the head/brain was performed without and with the administration of intravenous contrast. COMPARISON: CT head without contrast July 28, 2021. CT angiogram head with contrast July 28, 2021. HISTORY: ORDERING SYSTEM PROVIDED HISTORY: stroke vs MS TECHNOLOGIST PROVIDED HISTORY: Reason for exam:->stroke vs MS What reading provider will be dictating this exam?->CRC FINDINGS: INTRACRANIAL STRUCTURES/VENTRICLES:  There is no acute infarct. No mass effect or midline shift. No evidence of an acute intracranial hemorrhage. The ventricles and sulci are normal in size and configuration. The sellar/suprasellar regions appear unremarkable. The normal signal voids within the major intracranial vessels appear maintained. No abnormal focus of enhancement is seen within the brain. ORBITS: The visualized portion of the orbits demonstrate no acute abnormality. SINUSES: The visualized paranasal sinuses and mastoid air cells are well aerated. BONES/SOFT TISSUES: The bone marrow signal intensity appears normal. The soft tissues demonstrate no acute abnormality. Unremarkable contrast enhanced MRI brain examination. Resident's Assessment and Plan     Assessment and Plan:    1. Complex migraine with hemiplegia versus TIA  a. MRI brain showed unremarkable contrast-enhanced MRI brain examination  b.  There recommend

## 2021-07-30 NOTE — PROGRESS NOTES
Occupational Therapy  OCCUPATIONAL THERAPY INITIAL EVALUATION    [address] [address] [address]      Date:2021                                                Patient Name: Sebas Boogie  MRN: [id number]  : 1975  Room: [address] [place] [address]    Evaluating OT: Maxim Mitchell OTR/L #[id number]     Referring Provider: Vel Rausch MD  Specific Provider Orders/Date: OT eval and treat 21    Diagnosis: Stroke-like symptoms [R29.90]   Pt admitted to hospital with fatigue, weakness and headache     Pertinent Medical History:  has a past medical history of Abnormal Pap smear of cervix, Anemia, Anxiety, Asthma, Blind, Connective tissue disease ([address] Cyst of right breast, DDD (degenerative disc disease), cervical, Depression, Diabetes mellitus ([address] Dysphagia, Esophagus disorder, Fibromyalgia, Gastroparesis, GERD (gastroesophageal reflux disease), Headache, Hematuria, Hyperlipidemia, Hypertension, Hypothyroidism, IBS (irritable bowel syndrome), Immune deficiency disorder ([address] Insomnia, Kidney stones, Meningitis, Meningitis, PAULINE on CPAP, PCOS (polycystic ovarian syndrome), POTS (postural orthostatic tachycardia syndrome), Problems with swallowing, Pseudotumor cerebri, Raynaud's disease, Rectal bleeding, RLS (restless legs syndrome), Scleroderma ([address] Sjogren's disease ([address] Thyroid nodule, and TIA (transient ischemic attack).        Precautions:  Fall Risk, R sided weakness, L eye blind    Assessment of current deficits    [x] Functional mobility  [x]ADLs  [x] Strength               []Cognition    [x] Functional transfers   [x] IADLs         [x] Safety Awareness   [x]Endurance    [x] Fine Coordination              [x] Balance      [] Vision/perception   []Sensation     []Gross Motor Coordination  [] ROM  [] Delirium                   [] Motor Control     OT PLAN OF CARE   OT POC based on physician orders, patient diagnosis and results of clinical assessment    Frequency/Duration 1-3 days/wk for 2 weeks PRN   Specific OT Treatment Interventions to include:   * Instruction/training on adapted ADL techniques and AE recommendations to increase functional independence within precautions       * Training on energy conservation strategies, correct breathing pattern and techniques to improve independence/tolerance for self-care routine  * Functional transfer/mobility training/DME recommendations for increased independence, safety, and fall prevention  * Patient/Family education to increase follow through with safety techniques and functional independence  * Recommendation of environmental modifications for increased safety with functional transfers/mobility and ADLs  * Therapeutic exercise to improve motor endurance, ROM, and functional strength for ADLs/functional transfers  * Therapeutic activities to facilitate/challenge dynamic balance, stand tolerance for increased safety and independence with ADLs  * Therapeutic activities to facilitate gross/fine motor skills for increased independence with ADLs    Modified Gianluca Scale (MRS)  Score     Description  0             No symptoms  1             No significant disability despite symptoms  2             Slight disability; able to look after own affairs  3             Moderate disability; able to ambulate without assist/ requires assist with ADLs  4             Moderate/Severe disability;requires assist to ambulate/assist with ADLs  5             Severe disability;bedridden/incontinent   6               Score:   3     Recommended Adaptive Equipment: TBD     Home Living: Pt lives with significant other in a 1 story home with 2+1 THALIA and no hand rail    Bathroom setup: tub/shower combo    Equipment owned: quad cane, w/w, rollator    Prior Level of Function: mod I with ADLs , mod i with IADLs; ambulated without AD   Occupation: na    Pain Level: Pt reports 7/10 headache this session;  Therapist facilitated repositioning to address pain      Cognition: A&O: 4/4; Follows 2 step directions   Memory:  good   Sequencing:  good   Problem solving:  good   Judgement/safety: F+     Functional Assessment:  AM-PAC Daily Activity Raw Score: 19/24   Initial Eval Status  Date: 7/30/21 Treatment Status  Date: STGs = LTGs  Time frame: 10-14 days   Feeding Mod I    Issued built up foam due to c/o dropping utensils with fatigue      Grooming Supervision     Standing at sink   Modified [place]    UB Dressing Supervision     robe  Modified [place]    LB Dressing Stand by Assist   Modified [place]    Bathing Stand by Assist  Modified [place]    Toileting Stand by Assist   Modified [place]    Bed Mobility  Supine to sit: NT   Sit to supine: NT   Supine to sit: Modified [place]   Sit to supine: Modified [place]    Functional Transfers Stand by Assist   Modified [place]    Functional Mobility Stand by Assist     Ambulated in room and hallway with w/w; cuing on posture, w/w management and safety  Modified [place]    Balance Sitting:     Static:  indep    Dynamic:sup  Standing: SBA     Activity Tolerance F  G   Visual/  Perceptual Glasses: yes    L eye blind    Vision wfl                  Hand Dominance right    Strength ROM Additional Info:    RUE   3+/5 wfl fair  and fair FMC/dexterity noted during ADL tasks     LUE 4/5 wfl good  and wfl FMC/dexterity noted during ADL tasks     Hearing: wfl  Sensation:wfl  Tone: wfl  Edema:none noted     Comments: Upon arrival patient seated at EOB and agreeable to OT Session. Therapist educated pt on role of OT. At end of session, patient seated at EOB with call light and phone within reach, all lines and tubes intact. Overall patient demonstrated decreased independence and safety during completion of ADL/functional transfer/mobility tasks.   Pt would benefit from continued skilled OT to increase safety and independence with completion of ADL/IADL tasks for functional independence and quality of life. Treatment: OT treatment provided this date includes: Facilitation of unsupported sitting balance (impacting ADLs; addressing posture, weight shifting, dynamic reaching), functional transfers (various surfaces: bed, toilet), standing tolerance tasks (addressing posture, balance and activity tolerance while incorporating light functional reaching; impacting ADLs and functional activity) and functional ambulation tasks with w/w (including to/ from bathroom and in preparation for item retrieval tasks; cuing on posture, w/w management and safety) - skilled cuing on hand placement, posture, body mechanics, energy conservation techniques and safety. Therapist facilitated self-care retraining: UB/LB self-care tasks (robe, socks), toileting task, standing grooming tasks and self-feeding task (issued built up foam for utensils) educating pt on modified techniques, posture, safety and energy conservation techniques. Skilled monitoring of HR, O2 sats and pts response to treatment. Therapist educated pt on FM exercises for dexterity with R hand. Rehab Potential: Good for established goals     Patient / Family Goal: none stated      Patient and/or family were instructed on functional diagnosis, prognosis/goals and OT plan of care. Demonstrated fair+ understanding.      Eval Complexity: Low    Time In: 900  Time Out: 938  Total Treatment Time: 23 minutes    Min Units   OT Eval Low 97165  x  1   OT Eval Medium 69260      OT Eval High 23340      OT Re-Eval [id number]       Therapeutic Ex 46834       Therapeutic Activities 24147  11  1   ADL/Self Care 76471  12  1   Orthotic Management 80071       Manual [id number]     Neuro Re-Ed 99882       Non-Billable Time          Evaluation Time additionally includes thorough review of current medical information, gathering information on past medical history/social history and prior level of function, interpretation of standardized testing/informal observation of tasks, assessment of data and development of plan of care and goals.           Morales Gerard OTR/L #[id number]

## 2021-07-30 NOTE — CARE COORDINATION
Care Coordination: DC order noted--Will need Claude 80 orders on chart prior to dc - ,willie roberts [facility] notified of dc for today and Adam Murguia for [facility] care waiver notified of dc order at 21  via     Ulises Rosa

## 2021-07-30 NOTE — PROGRESS NOTES
[facility]  Internal Medicine Residency Program  Progress Note - House Team 2    Patient:  Laury Stephens 39 y.o. female MRN: [id number]     Date of Service: 7/30/2021     CC: Headache w/ right side weakness  Overnight events: MRI negative    Subjective     Patient was examined this morning and felt improvement of her symptoms. Right upper and lower extremities had improvement, and was able to ambulate with a walker. Patient was not in distress, but felt shakiness when the MRI was being done, but the symptoms resolved afterwards. Patient still has headache and numbness on right side of face. She denies palpitations and shortness of breath. Objective     Physical Exam:  · Vitals: /65   Pulse 78   Temp 97.6 °F (36.4 °C) (Oral)   Resp 14   Ht 5' 3\" (1.6 m)   Wt 206 lb (93.4 kg)   SpO2 95%   BMI 36.49 kg/m²     · I & O - 24hr: I/O this shift:  · In: 240 [P.O.:240]  · Out: -    · General Appearance: alert, appears stated age, cooperative and no distress  · HEENT:  Head: Normal, normocephalic, atraumatic. · Eye: Blind in left eye  · Neck: no adenopathy, no carotid bruit, no JVD, supple, symmetrical, trachea midline and thyroid not enlarged, symmetric, no tenderness/mass/nodules  · Lung: clear to auscultation bilaterally  · Heart: regular rate and rhythm, S1, S2 normal, no murmur, click, rub or gallop  · Abdomen: soft, non-tender; bowel sounds normal; no masses,  no organomegaly  · Extremities:  extremities normal, atraumatic, no cyanosis or edema  · Musculokeletal: No joint swelling, no muscle tenderness. ROM normal in all joints of extremities. · Neurologic: Mental status: Alert, oriented, thought content appropriate, Right sided weakness in upper and lower extremity.   Subject  Pertinent Labs & Imaging Studies   bill  CBC:   Lab Results   Component Value Date    WBC 4.4 07/30/2021    RBC 3.97 07/30/2021    RBC 4.31 03/11/2020    HGB 11.8 07/30/2021    HCT 37.6 07/30/2021    MCV 94.7 Aleksey Lima is a 39 y.o. female that present with headache and right sided weakness. 1. Complex migraine w/ hemiplegia vs TIA vs MS   A. MRI showed unremarkable contrast and no abnormalities. B. Supportive care   C. Continue Toradol, benadryl, phenergan, Mg, propanolol, depakote  2. Consider conversion disorder   A. Via neuro consult  3. History of hypogammaglobinemia              A. Patient currently receiving IVIG once monthly and on Gammagard through immunologist at [facility]  4. Bilateral vision loss              A. Blind in left eye and impaired vision in right eye  5. Hx of IDDM              A. A1c at 10.9              B. Patient currently on Lantus 22 units in morning and 18 units in evening with 4-6 units Humalog with meals              C. Metformin  mg twice daily  6. Hx Sjogren's disease              A. Continue home meds  7. Hx of hypothyroidism              A. Continue home med levothyroxine  8.  Hx of PAULINE              A. CPAP at home    PT/OT evaluation:  DVT prophylaxis/ GI prophylaxis:   Disposition: home +/- home health / SNF / Marlen Fraga / [address] [place] [address], [place]  Attending physician: Dr. Josee Palmer

## 2021-07-30 NOTE — DISCHARGE SUMMARY
[address]  Discharge Summary    PCP: Laura Garza DO    Admit Date:7/28/2021  Discharge Date: 7/30/2021    Admission Diagnosis:   1. Complex migraine with hemiplegia versus TIA  2. History of insulin-dependent diabetes mellitus type 2  3. Hypertriglyceridemia  4. History of hypogammaglobulinemia  5. History of bilateral visual loss left greater than right  6. History of Sjogren's syndome  7. History hypothyroidism  8. History of PAULINE    Discharge Diagnosis:  9. Complex migraine with hemiplegia versus TIA -- improved  10. History of insulin-dependent diabetes mellitus type 2  11. Hypertriglyceridemia  12. History of hypogammaglobulinemia  13. History of bilateral visual loss left greater than right  14. History of Sjogren's syndrome  15. History hypothyroidism  16. History of PAULINE      Hospital Course: The patient is a 39 y.o. female with a past medical history of diabetes, she groans syndrome, myalgias, hypogammaglobinemia, complex migraines with hemiplegia, Raynaud's phenomenon, TIA, partial blindness, POTS, polycystic ovarian disease, GERD, and possible connective tissue disorder presents to the emergency department due to dizziness, headaches, and extreme weakness on the right side. She says she went to bed yesterday about 10 PM where she was last feeling normal and woke up around 4 AM this morning when she was feeling dizzy, had a headache, and the right arm and leg tended to be weak. She states that this is happened before and follows currently with neurology locally. She states that these headaches that she is experiencing today are different than headache she has had previously. She states that she has ambulatory normally with no focal weakness in the right side however today she is unable to ambulate due to weakness on the right side. States she is on various medications for headaches however none of them seem to be working at the current moment.   She also notes that she is blind in the left eye. She states she was recently at [facility] for similar complaints. She stated that she had an MRI performed with a questionable Medela lesion. Denies any chest pain, shortness of breath, vomiting, abdominal pain. Notable labs/imaging in the ED: Admission the ED patient had a blood pressure 146/101. UA was negative. CXR showed no evidence of acute abnormality. Patient's blood glucose was 186. Troponin is less than 6. CT head, CTA head, CT neck, CTA neck showed no acute intracranial abnormality with no perfusion mismatch no acute abnormality or flow-limiting stenosis of the major arteries of the head neck. Medications/fluids in ED: Patient was given a 1 L bolus of normal saline, 25 mg of Benadryl, 10 mg Toradol, 4 mg Zofran. Upon admission, neurology was consulted. MRI was performed and showed no abnormalities or enhancements. Patients condition continued to improve throughout the hospital course. Neurology recommendations were to continue supportive care for the patient. Patient is medically stable upon discharge     Significant findings (history and exam, laboratory, radiological, pathology, other tests):     Physical Exam  Constitutional:       General: She is not in acute distress. Appearance: She is well-developed. She is not diaphoretic. HENT:      Head: Normocephalic and atraumatic. Eyes:      General: No scleral icterus. Pupils: Pupils are equal, round, and reactive to light. Neck:      Thyroid: No thyromegaly. Vascular: No JVD. Trachea: No tracheal deviation. Cardiovascular:      Rate and Rhythm: Normal rate and regular rhythm. Heart sounds: Normal heart sounds. No murmur heard. No friction rub. No gallop. Pulmonary:      Effort: Pulmonary effort is normal. No respiratory distress. Breath sounds: Normal breath sounds. No wheezing or rales. Abdominal:      General: Bowel sounds are normal. There is no distension. Palpations: Abdomen is soft. There is no mass. Tenderness: There is no abdominal tenderness. There is no guarding or rebound. Musculoskeletal:         General: Normal range of motion. Skin:     General: Skin is warm and dry. Capillary Refill: Capillary refill takes less than 2 seconds. Coloration: Skin is not pale. Findings: No rash. Comments: Port R side   Neurological:      Mental Status: She is alert and oriented to person, place, and time. Motor: Weakness (R sided UE LE) present. Comments: dimished vision b/l L>R   Psychiatric:         Behavior: Behavior normal.         Thought Content: Thought content normal.         Judgment: Judgment normal.           Pending test results: None    Consults:  1. Neurology      Procedures:  1. None    Condition at discharge: Stable    Disposition: home    Discharge Medications:  Current Discharge Medication List      CONTINUE these medications which have NOT CHANGED    Details   Cinnamon 500 MG CAPS Take 600 mg by mouth 2 times daily      !! NONFORMULARY 300 mg 3 times daily Carlos - Holy Basal      cimetidine (TAGAMET) 300 MG tablet Take 300 mg by mouth 3 times daily      levothyroxine (SYNTHROID) 50 MCG tablet Take 2.5 tablets by mouth Five times weekly Given Monday,Tuesday,Wednesday,Thursday,Friday  Qty: 90 tablet, Refills: 1    Associated Diagnoses: Acquired hypothyroidism      CPAP Machine MISC by Does not apply route nightly. 5-10cm.       Immune Globulin, Human, (GAMMAGARD IV) Infuse intravenously 40 gm per port once a month      propranolol (INDERAL) 10 MG tablet TAKE 1 TABLET BY MOUTH THREE TIMES DAILY  Qty: 90 tablet, Refills: 3    Associated Diagnoses: Essential hypertension      ascorbic acid (VITAMIN C) 500 MG tablet TAKE 1 TABLET BY MOUTH EVERY DAY      divalproex (DEPAKOTE ER) 500 MG extended release tablet TAKE 1 TABLET BY MOUTH THREE TIMES DAILY      zinc 50 MG CAPS Take 50 mg by mouth daily  Qty: 30 capsule, Refills: 0 polyethylene glycol (GLYCOLAX) 17 g packet Take 17 g by mouth 2 times daily as needed for Constipation  Qty: 527 g, Refills: 2      Polyvinyl Alcohol-Povidone (REFRESH OP) Apply to eye Artificial tears      insulin glargine (LANTUS SOLOSTAR) 100 UNIT/ML injection pen Injects 8 units in the morning and 14 units at night      ketorolac (TORADOL) 10 MG tablet Take 1 tablet by mouth every 6 hours as needed for Pain  Qty: 20 tablet, Refills: 0      Magnesium 500 MG TABS Take 1 tablet by mouth 2 times daily      insulin lispro, 1 Unit Dial, (HUMALOG KWIKPEN) 100 UNIT/ML SOPN Inject into the skin daily Sliding scale: 150-200- 4 units, then add 2 units for every 50 up to a max of 12 units.       Probiotic Product (PROBIOTIC DAILY PO) daily       furosemide (LASIX) 20 MG tablet Take 1 tablet by mouth daily  Qty: 60 tablet, Refills: 3    Associated Diagnoses: Essential hypertension      aspirin 81 MG EC tablet Take 81 mg by mouth daily       rOPINIRole (REQUIP) 0.5 MG tablet Take 1 tablet by mouth See Admin Instructions 1 tablet - breakfast  2 tablets - supper  Qty: 270 tablet, Refills: 1    Associated Diagnoses: Restless legs      pantoprazole (PROTONIX) 40 MG tablet Take 1 tablet by mouth every morning (before breakfast)  Qty: 90 tablet, Refills: 1    Associated Diagnoses: Gastroesophageal reflux disease without esophagitis      folic acid (FOLVITE) 1 MG tablet Take 1 tablet by mouth Daily with lunch  Qty: 90 tablet, Refills: 1      acetaminophen (TYLENOL) 325 MG tablet Take 650 mg by mouth every 6 hours as needed for Pain      sodium chloride (OCEAN, BABY AYR) 0.65 % nasal spray 1 spray by Nasal route 4 times daily as needed for Congestion      azelastine (ASTELIN) 0.1 % nasal spray 1 spray by Nasal route 2 times daily      diphenhydrAMINE (BENYLIN) 12.5 MG/5ML liquid Take 25 mg by mouth 4 times daily as needed for Allergies (migrane break through)      Artificial Saliva (BIOTENE DRY MOUTH MOISTURIZING MT) Take 1 spray by mouth every 3 hours      OIL OF OREGANO PO Take 60 mg by mouth daily (with breakfast)       miconazole (MICOTIN) 2 % powder Apply topically daily Apply to groin, under breast and skin folds. magnesium hydroxide (MILK OF MAGNESIA) 400 MG/5ML suspension Take 30 mLs by mouth 4 times daily as needed for Constipation      SENNA LEAVES PO Take 470 mg by mouth three times daily       promethazine (PHENERGAN) 25 MG tablet Take 25 mg by mouth every 6 hours as needed for Nausea       !! NONFORMULARY Take 2 capsules by mouth 2 times daily (with meals) Turmeric Ginger      potassium chloride (KLOR-CON) 20 MEQ packet Take 20 mEq by mouth daily  Qty: 90 packet, Refills: 2      clonazePAM (KLONOPIN) 0.5 MG tablet Take 0.5 mg by mouth 2 times daily. Nga Pena econazole nitrate 1 % cream Apply topically daily as needed (Apply under breasts and folds)       fluticasone (FLONASE) 50 MCG/ACT nasal spray 1 spray by Nasal route 2 times daily       fluticasone-salmeterol (ADVAIR HFA) 230-21 MCG/ACT inhaler Inhale 2 puffs into the lungs 2 times daily      montelukast (SINGULAIR) 10 MG tablet Take 10 mg by mouth nightly      ondansetron (ZOFRAN-ODT) 8 MG TBDP disintegrating tablet Take 8 mg by mouth every 8 hours as needed for Nausea or Vomiting       ipratropium-albuterol (DUONEB) 0.5-2.5 (3) MG/3ML SOLN nebulizer solution Inhale 1 vial into the lungs every 4 hours as needed for Shortness of Breath        !! - Potential duplicate medications found. Please discuss with provider.       STOP taking these medications       senna (SENOKOT) 8.6 MG tablet Comments:   Reason for Stopping:         methylPREDNISolone (MEDROL) 4 MG tablet Comments:   Reason for Stopping:         triamcinolone (KENALOG) 0.1 % cream Comments:   Reason for Stopping:         levomilnacipran (FETZIMA) 40 MG CP24 extended release capsule Comments:   Reason for Stopping:               Activity: activity as tolerated  Diet: regular diet    Follow-up appointments: Please follow up with Perri Jung DO within one week of hospital discharge. Patient Instructions: Please take all medications as prescribed.      Melisa Sanches DO, PhD    PGY 1  9:43 AM 7/30/2021

## 2021-07-30 NOTE — PROGRESS NOTES
Genesis Dietz is a 39 y.o.  female     Neurology is following for headache and dizziness    PMH: Migraines, mixed classic connective tissue disorder, prior TIAs, left eye blindness, POTS    Patient presented to ED with complaint of right-sided weakness and numbness occurring upon awakening. Patient also had dizziness and headache with slurred speech. Patient had similar symptoms a week ago although this occurrence was more severe. MRI brain, CTA and CTP were unrevealing during this admission. This morning patient reports headache a 4/10 after being given PRNs. Vital signs at present time are stable    There is no family present at bedside    Objective:     /65   Pulse 78   Temp 97.6 °F (36.4 °C) (Oral)   Resp 14   Ht 5' 3\" (1.6 m)   Wt 206 lb (93.4 kg)   SpO2 95%   BMI 36.49 kg/m²     General appearance: alert, appears stated age, cooperative and no distress  Head: normocephalic, without obvious abnormality, atraumatic  Eyes: conjunctivae/corneas clear.  .  Neck: Symmetrical  Lungs: Unlabored breaths on room air  Heart: regular rate and rhythm  Extremities: normal, atraumatic, no cyanosis or edema  Pulses: 2+ and symmetric  Skin: color, texture, turgor normal---no rashes or lesions      Mental Status: Alert and oriented x4, follows commands, resting quietly in bed    Appropriate attention/concentration  Intact fundus of knowledge  Intact memories    Speech: no dysarthria  Language: no aphasias    Cranial Nerves:  I: smell NA   II: visual acuity  NA   II: visual fields Full to confrontation, chronic left eye vision loss   II: pupils DAVID   III,VII: ptosis None   III,IV,VI: extraocular muscles   EOMI without nystagmus   V: mastication Normal   V: facial light touch sensation   right facial numbness reported   V,VII: corneal reflex     VII: facial muscle function - upper  Normal   VII: facial muscle function - lower Normal   VIII: hearing Normal   IX: soft palate elevation  Normal   IX,X: gag reflex    XI: trapezius strength  5/5   XI: sternocleidomastoid strength 5/5   XI: neck extension strength  5/5   XII: tongue strength  Normal     Motor:  4/5 strength on the right; left side 5/5  Obese bulk, normal tone  No abnormal movements    Sensory:  Right hemisensory loss    Coordination:   FN, FFM and MEGAN intact    Gait:  Not tested    DTR:   +1 throughout    No Babinskis  No Silvestre's    No pathological reflexes    Laboratory/Radiology:     CBC:   Lab Results   Component Value Date    WBC 4.4 07/30/2021    RBC 3.97 07/30/2021    RBC 4.31 03/11/2020    HGB 11.8 07/30/2021    HCT 37.6 07/30/2021    MCV 94.7 07/30/2021    MCH 29.7 07/30/2021    MCHC 31.4 07/30/2021    RDW 14.8 07/30/2021     07/30/2021    MPV 10.9 07/30/2021     CMP:    Lab Results   Component Value Date     07/30/2021    K 4.0 07/30/2021     07/30/2021    CO2 21 07/30/2021    BUN 16 07/30/2021    CREATININE 0.7 07/30/2021    GFRAA >60 07/30/2021    AGRATIO 1.0 01/22/2021    LABGLOM >60 07/30/2021    GLUCOSE 188 07/30/2021    PROT 7.4 07/28/2021    LABALBU 3.8 07/28/2021    CALCIUM 8.4 07/30/2021    BILITOT 0.4 07/28/2021    ALKPHOS 42 07/28/2021    AST 19 07/28/2021    ALT 22 07/28/2021     U/A:    Lab Results   Component Value Date    NITRITE NEG 09/24/2020    COLORU Yellow 07/28/2021    PROTEINU Negative 07/28/2021    PHUR 7.0 07/28/2021    WBCUA NONE 07/28/2021    RBCUA NONE 07/28/2021    BACTERIA NONE SEEN 07/28/2021    CLARITYU Clear 07/28/2021    SPECGRAV 1.010 07/28/2021    LEUKOCYTESUR Negative 07/28/2021    UROBILINOGEN 0.2 07/28/2021    BILIRUBINUR Negative 07/28/2021    BILIRUBINUR NEG 09/24/2020    BLOODU Negative 07/28/2021    GLUCOSEU Negative 07/28/2021     HgBA1c:    Lab Results   Component Value Date    LABA1C 8.6 07/29/2021     FLP:    Lab Results   Component Value Date    TRIG 278 07/29/2021    HDL 36 07/29/2021    LDLCALC 56 07/29/2021    LABVLDL 56 07/29/2021     TSH:    Lab Results   Component Value Date    TSH 1.430 2020     MRI BRAIN W WO CONTRAST   Final Result   Unremarkable contrast enhanced MRI brain examination. CT HEAD WO CONTRAST   Final Result   1. No acute intracranial abnormality. 2. No perfusion mismatch. 3. No acute abnormality or flow-limiting stenosis of the major arteries of   the head and neck. CTA NECK W CONTRAST   Final Result   1. No acute intracranial abnormality. 2. No perfusion mismatch. 3. No acute abnormality or flow-limiting stenosis of the major arteries of   the head and neck. CTA HEAD W CONTRAST   Final Result   1. No acute intracranial abnormality. 2. No perfusion mismatch. 3. No acute abnormality or flow-limiting stenosis of the major arteries of   the head and neck. CT BRAIN PERFUSION   Final Result   1. No acute intracranial abnormality. 2. No perfusion mismatch. 3. No acute abnormality or flow-limiting stenosis of the major arteries of   the head and neck. XR CHEST PORTABLE   Final Result   No radiographic evidence of acute abnormality.              All labs and images personally reviewed today    Assessment:     Complex migraine in the setting of multiple chronic conditions   Provokers may include chronic sleep deprivation and seasonal allergy; medication overuse is also a possibility    Currently obtaining 4 hours on CPAP machine   Continue Depakote 500 mg 3 times daily, Inderal 10 mg 3 times daily and magnesium 400 mg twice daily   Depakote, Benadryl, Inderal, Motrin and Phenergan given this morning around 844AM    Headache 4/10 after above    Plan:     Reviewed chart and patient's condition with Dr. Itzel Zaragoza this morning  Continue supportive care  Toradol x1 for headache   Continue aspirin and statin for stroke prevention  Recommend daily antihistamine such as over-the-counter Claritin  Recommend repeat sleep study  Better sleep hygiene  Simplify medication list    Okay to discharge from neuro standpoint

## 2021-07-30 NOTE — PLAN OF CARE
Problem: Pain:  Goal: Pain level will decrease  Description: Pain level will decrease  Outcome: Met This Shift     Problem: Skin Integrity:  Goal: Will show no infection signs and symptoms  Description: Will show no infection signs and symptoms  Outcome: Met This Shift     Problem: Falls - Risk of:  Goal: Will remain free from falls  Description: Will remain free from falls  Outcome: Met This Shift

## 2021-08-06 ENCOUNTER — APPOINTMENT (OUTPATIENT)
Dept: ULTRASOUND IMAGING | Age: 46
End: 2021-08-06
Payer: MEDICAID

## 2021-08-06 ENCOUNTER — APPOINTMENT (OUTPATIENT)
Dept: GENERAL RADIOLOGY | Age: 46
End: 2021-08-06
Payer: MEDICAID

## 2021-08-06 LAB
ALBUMIN SERPL-MCNC: 3.9 G/DL (ref 3.5–5.2)
ALP BLD-CCNC: 43 U/L (ref 35–104)
ALT SERPL-CCNC: 24 U/L (ref 0–32)
ANION GAP SERPL CALCULATED.3IONS-SCNC: 8 MMOL/L (ref 7–16)
AST SERPL-CCNC: 26 U/L (ref 0–31)
BASOPHILS ABSOLUTE: 0.03 E9/L (ref 0–0.2)
BASOPHILS RELATIVE PERCENT: 0.7 % (ref 0–2)
BILIRUB SERPL-MCNC: 0.3 MG/DL (ref 0–1.2)
BILIRUBIN URINE: NEGATIVE
BLOOD, URINE: NEGATIVE
BUN BLDV-MCNC: 18 MG/DL (ref 6–20)
CALCIUM SERPL-MCNC: 9.2 MG/DL (ref 8.6–10.2)
CHLORIDE BLD-SCNC: 104 MMOL/L (ref 98–107)
CLARITY: CLEAR
CO2: 28 MMOL/L (ref 22–29)
COLOR: YELLOW
CREAT SERPL-MCNC: 1.2 MG/DL (ref 0.5–1)
EOSINOPHILS ABSOLUTE: 0.04 E9/L (ref 0.05–0.5)
EOSINOPHILS RELATIVE PERCENT: 0.9 % (ref 0–6)
GFR AFRICAN AMERICAN: 59
GFR NON-AFRICAN AMERICAN: 49 ML/MIN/1.73
GLUCOSE BLD-MCNC: 144 MG/DL (ref 74–99)
GLUCOSE URINE: NEGATIVE MG/DL
HCT VFR BLD CALC: 37.9 % (ref 34–48)
HEMOGLOBIN: 12 G/DL (ref 11.5–15.5)
IMMATURE GRANULOCYTES #: 0.02 E9/L
IMMATURE GRANULOCYTES %: 0.5 % (ref 0–5)
KETONES, URINE: NEGATIVE MG/DL
LEUKOCYTE ESTERASE, URINE: NEGATIVE
LYMPHOCYTES ABSOLUTE: 1.98 E9/L (ref 1.5–4)
LYMPHOCYTES RELATIVE PERCENT: 45.8 % (ref 20–42)
MCH RBC QN AUTO: 30.2 PG (ref 26–35)
MCHC RBC AUTO-ENTMCNC: 31.7 % (ref 32–34.5)
MCV RBC AUTO: 95.2 FL (ref 80–99.9)
MONOCYTES ABSOLUTE: 0.38 E9/L (ref 0.1–0.95)
MONOCYTES RELATIVE PERCENT: 8.8 % (ref 2–12)
NEUTROPHILS ABSOLUTE: 1.87 E9/L (ref 1.8–7.3)
NEUTROPHILS RELATIVE PERCENT: 43.3 % (ref 43–80)
NITRITE, URINE: NEGATIVE
PDW BLD-RTO: 14.7 FL (ref 11.5–15)
PH UA: 7.5 (ref 5–9)
PLATELET # BLD: 188 E9/L (ref 130–450)
PMV BLD AUTO: 11.1 FL (ref 7–12)
POTASSIUM SERPL-SCNC: 4.1 MMOL/L (ref 3.5–5)
PRO-BNP: 103 PG/ML (ref 0–125)
PROTEIN UA: NEGATIVE MG/DL
RBC # BLD: 3.98 E12/L (ref 3.5–5.5)
SODIUM BLD-SCNC: 140 MMOL/L (ref 132–146)
SPECIFIC GRAVITY UA: 1.01 (ref 1–1.03)
TOTAL PROTEIN: 6.9 G/DL (ref 6.4–8.3)
TROPONIN, HIGH SENSITIVITY: 6 NG/L (ref 0–9)
UROBILINOGEN, URINE: 0.2 E.U./DL
WBC # BLD: 4.3 E9/L (ref 4.5–11.5)

## 2021-08-06 PROCEDURE — 81003 URINALYSIS AUTO W/O SCOPE: CPT

## 2021-08-06 PROCEDURE — 93005 ELECTROCARDIOGRAM TRACING: CPT | Performed by: NURSE PRACTITIONER

## 2021-08-06 PROCEDURE — 85025 COMPLETE CBC W/AUTO DIFF WBC: CPT

## 2021-08-06 PROCEDURE — 71045 X-RAY EXAM CHEST 1 VIEW: CPT

## 2021-08-06 PROCEDURE — 84484 ASSAY OF TROPONIN QUANT: CPT

## 2021-08-06 PROCEDURE — 99282 EMERGENCY DEPT VISIT SF MDM: CPT

## 2021-08-06 PROCEDURE — 93971 EXTREMITY STUDY: CPT

## 2021-08-06 PROCEDURE — 83880 ASSAY OF NATRIURETIC PEPTIDE: CPT

## 2021-08-06 PROCEDURE — 80053 COMPREHEN METABOLIC PANEL: CPT

## 2021-08-06 PROCEDURE — 96374 THER/PROPH/DIAG INJ IV PUSH: CPT

## 2021-08-06 ASSESSMENT — PAIN SCALES - GENERAL: PAINLEVEL_OUTOF10: 8

## 2021-08-06 ASSESSMENT — PAIN DESCRIPTION - LOCATION: LOCATION: HEAD

## 2021-08-06 ASSESSMENT — PAIN DESCRIPTION - DESCRIPTORS: DESCRIPTORS: HEADACHE

## 2021-08-06 ASSESSMENT — PAIN DESCRIPTION - PAIN TYPE: TYPE: ACUTE PAIN

## 2021-08-07 ENCOUNTER — APPOINTMENT (OUTPATIENT)
Dept: CT IMAGING | Age: 46
End: 2021-08-07
Payer: MEDICAID

## 2021-08-07 ENCOUNTER — HOSPITAL ENCOUNTER (EMERGENCY)
Age: 46
Discharge: HOME | End: 2021-08-07
Attending: EMERGENCY MEDICINE
Payer: MEDICAID

## 2021-08-07 VITALS
HEART RATE: 76 BPM | SYSTOLIC BLOOD PRESSURE: 106 MMHG | TEMPERATURE: 97.5 F | RESPIRATION RATE: 18 BRPM | HEIGHT: 62 IN | OXYGEN SATURATION: 94 % | WEIGHT: 220 LBS | BODY MASS INDEX: 40.48 KG/M2 | DIASTOLIC BLOOD PRESSURE: 80 MMHG

## 2021-08-07 DIAGNOSIS — I10 ESSENTIAL HYPERTENSION: Primary | ICD-10-CM

## 2021-08-07 DIAGNOSIS — R51.9 ACUTE NONINTRACTABLE HEADACHE, UNSPECIFIED HEADACHE TYPE: ICD-10-CM

## 2021-08-07 PROCEDURE — 74176 CT ABD & PELVIS W/O CONTRAST: CPT

## 2021-08-07 PROCEDURE — 6360000002 HC RX W HCPCS: Performed by: EMERGENCY MEDICINE

## 2021-08-07 PROCEDURE — 2580000003 HC RX 258: Performed by: EMERGENCY MEDICINE

## 2021-08-07 RX ORDER — 0.9 % SODIUM CHLORIDE 0.9 %
1000 INTRAVENOUS SOLUTION INTRAVENOUS ONCE
Status: COMPLETED | OUTPATIENT
Start: 2021-08-07 | End: 2021-08-07

## 2021-08-07 RX ORDER — DROPERIDOL 2.5 MG/ML
1.25 INJECTION, SOLUTION INTRAMUSCULAR; INTRAVENOUS ONCE
Status: COMPLETED | OUTPATIENT
Start: 2021-08-07 | End: 2021-08-07

## 2021-08-07 RX ADMIN — SODIUM CHLORIDE 1000 ML: 9 INJECTION, SOLUTION INTRAVENOUS at 04:44

## 2021-08-07 RX ADMIN — DROPERIDOL 1.25 MG: 2.5 INJECTION, SOLUTION INTRAMUSCULAR; INTRAVENOUS at 04:45

## 2021-08-07 NOTE — ED NOTES
FIRST PROVIDER CONTACT ASSESSMENT NOTE                                                                                                Department of Emergency Medicine                                                      First Provider Note  21  8:07 PM EDT  NAME: Britney Underwood  : 1975  MRN: [id number]    Chief Complaint: Hypertension (HA, HTN, leg swelling) and Leg Swelling      History of Present Illness:   Britney Underwood is a 39 y.o. female who presents to the ED for continued headache as well as shortness of breath and left lower extremity swelling. Patient was recently admitted to the hospital , with diagnosis of strokelike symptoms as well as having headache. She did have an MRI on  which was negative. She reports that today her blood pressure was elevated, here it is 177/109 she reports her headache is actually worse than what she was when she was in the hospital she is also reporting left leg swelling not to. Focused Physical Exam:  VS:    ED Triage Vitals   BP Temp Temp src Pulse Resp SpO2 Height Weight   21 -- 21   (!) 177/109 97.5 °F (36.4 °C)  95 16 97 % 5' 2\" (1.575 m) 220 lb (99.8 kg)        General: Alert and in no apparent distress.     Medical History:  has a past medical history of Abnormal Pap smear of cervix, Anemia, Anxiety, Asthma, Blind, Connective tissue disease ([address] Cyst of right breast, DDD (degenerative disc disease), cervical, Depression, Diabetes mellitus ([address] Dysphagia, Esophagus disorder, Fibromyalgia, Gastroparesis, GERD (gastroesophageal reflux disease), Headache, Hematuria, Hyperlipidemia, Hypertension, Hypothyroidism, IBS (irritable bowel syndrome), Immune deficiency disorder ([address] Insomnia, Kidney stones, Meningitis, Meningitis, PAULINE on CPAP, PCOS (polycystic ovarian syndrome), POTS (postural orthostatic tachycardia syndrome), Problems with swallowing, Pseudotumor cerebri, Raynaud's disease, Rectal bleeding, RLS (restless legs syndrome), Scleroderma ([address] Sjogren's disease [address] [address] Thyroid nodule, and TIA (transient ischemic attack). Surgical History:  has a past surgical history that includes Hysterectomy; Cholecystectomy; Appendectomy; Carpal tunnel release (Bilateral); Port Surgery; and Pyloroplasty. Social History:  reports that she has never smoked. She has never used smokeless tobacco. She reports that she does not drink alcohol and does not use drugs. Family History: family history includes Alzheimer's Disease in her father, mother, paternal aunt, and paternal uncle; Heart Disease (age of onset: 59) in her father; High Cholesterol in her brother; Hypertension in her brother; Osteoporosis in her sister; Stroke in her father and sister.     Allergies: Cymbalta [duloxetine hcl], Diamox [acetazolamide], Doxepin, Duloxetine, Grapeseed extract [nutritional supplements], Levaquin [levofloxacin in d5w], Lipitor [atorvastatin], Other, Pecan nut (diagnostic), Prochlorperazine, Red dye, Reglan [metoclopramide], Rocephin [ceftriaxone], Rosemary oil, Strawberry flavor, Topamax [topiramate], Triptans, [place] [macadamia nut oil], Abilify [aripiprazole], Aspartame and phenylalanine, Cefdinir, Darvon [propoxyphene], Dilaudid [hydromorphone hcl], Doxycycline, Effexor [venlafaxine], Gluten meal, Hydrochlorothiazide, Iron, Meclizine, Milk-related compounds, Sulfa antibiotics, Tetracyclines & related, Wheat bran, Zithromax [azithromycin], Adhesive tape, and Betamethasone     Initial Plan of Care:  Initiate Treatment-Testing, Proceed toTreatment Area When Bed Available for ED Attending/MLP to Continue Care    -------------------------------------------------END OF FIRST PROVIDER CONTACT ASSESSMENT NOTE--------------------------------------------------------  Electronically signed by REYNOLD Davis CNP   DD: 8/6/21         REYNOLD Davis CNP  08/06/21 2009

## 2021-08-07 NOTE — ED PROVIDER NOTES
HPI:  8/7/21,   Time: 4:38 AM EDT       Abhishek Bansal is a 39 y.o. female presenting to the ED for elevated blood pressure and leg swelling, beginning 2 days ago. The complaint has been persistent, mild in severity, and worsened by nothing. The patient states she was recently in the hospital for a complex migraine. She states that she has been doing well but today her home health nurse evaluated her and her blood pressure was elevated. She states she called her primary care doctor who is out of town and told her to come to the ED to be evaluated. She states she has been having a headache but this has been persistent since she was even discharged from the hospital.  This is unchanged. She states she has also noticed some trace edema and has been taking her Lasix at home with minimal improvement. Denies any chest pain shortness of breath. No numbness tingling. Vision at baseline. No nausea vomiting diarrhea.     Review of Systems:   Pertinent positives and negatives are stated within HPI, all other systems reviewed and are negative.          --------------------------------------------- PAST HISTORY ---------------------------------------------  Past Medical History:  has a past medical history of Abnormal Pap smear of cervix, Anemia, Anxiety, Asthma, Blind, Connective tissue disease ([address] Cyst of right breast, DDD (degenerative disc disease), cervical, Depression, Diabetes mellitus ([address] Dysphagia, Esophagus disorder, Fibromyalgia, Gastroparesis, GERD (gastroesophageal reflux disease), Headache, Hematuria, Hyperlipidemia, Hypertension, Hypothyroidism, IBS (irritable bowel syndrome), Immune deficiency disorder ([address] Insomnia, Kidney stones, Meningitis, Meningitis, PAULINE on CPAP, PCOS (polycystic ovarian syndrome), POTS (postural orthostatic tachycardia syndrome), Problems with swallowing, Pseudotumor cerebri, Raynaud's disease, Rectal bleeding, RLS (restless legs syndrome), Scleroderma ([address] Sjogren's disease [facility]), Thyroid nodule, and TIA (transient ischemic attack). Past Surgical History:  has a past surgical history that includes Hysterectomy; Cholecystectomy; Appendectomy; Carpal tunnel release (Bilateral); Port Surgery; and Pyloroplasty. Social History:  reports that she has never smoked. She has never used smokeless tobacco. She reports that she does not drink alcohol and does not use drugs. Family History: family history includes Alzheimer's Disease in her father, mother, paternal aunt, and paternal uncle; Heart Disease (age of onset: 59) in her father; High Cholesterol in her brother; Hypertension in her brother; Osteoporosis in her sister; Stroke in her father and sister. The patients home medications have been reviewed.     Allergies: Cymbalta [duloxetine hcl], Diamox [acetazolamide], Doxepin, Duloxetine, Grapeseed extract [nutritional supplements], Levaquin [levofloxacin in d5w], Lipitor [atorvastatin], Other, Pecan nut (diagnostic), Prochlorperazine, Red dye, Reglan [metoclopramide], Rocephin [ceftriaxone], Rosemary oil, Strawberry flavor, Topamax [topiramate], Triptans, [place] [macadamia nut oil], Abilify [aripiprazole], Aspartame and phenylalanine, Cefdinir, Darvon [propoxyphene], Dilaudid [hydromorphone hcl], Doxycycline, Effexor [venlafaxine], Gluten meal, Hydrochlorothiazide, Iron, Meclizine, Milk-related compounds, Sulfa antibiotics, Tetracyclines & related, Wheat bran, Zithromax [azithromycin], Adhesive tape, and Betamethasone        ---------------------------------------------------PHYSICAL EXAM--------------------------------------    Constitutional/General: Alert and oriented x3, NAD  Head: Normocephalic and atraumatic  Eyes: PERRL, EOMI, conjunctive normal, sclera non icteric  Mouth: Oropharynx clear, handling secretions, no trismus, no asymmetry of the posterior oropharynx or uvular edema  Neck: Supple, full ROM, non tender to palpation in the midline, no stridor, no crepitus, no meningeal signs  Respiratory: Lungs clear to auscultation bilaterally, no wheezes, rales, or rhonchi. Not in respiratory distress  Cardiovascular:  Regular rate. Regular rhythm. No murmurs, gallops, or rubs. 2+ distal pulses  GI:  Abdomen Soft, Non tender, Non distended. +BS. No organomegaly, no palpable masses,  No rebound, guarding, or rigidity. Musculoskeletal: Moves all extremities x 4. Warm and well perfused, no clubbing, cyanosis. Trace edema to bilateral lower extremities capillary refill <3 seconds  Integument: skin warm and dry. No rashes. Neurologic: GCS 15, no focal deficits, symmetric strength 5/5 in the upper and lower extremities bilaterally, sensation intact all 4 extremities, cranial nerves II to XII intact  Psychiatric: Normal Affect    -------------------------------------------------- RESULTS -------------------------------------------------  I have personally reviewed all laboratory and imaging results for this patient. Results are listed below.      LABS:  Results for orders placed or performed during the hospital encounter of 08/07/21   Troponin   Result Value Ref Range    Troponin, High Sensitivity 6 0 - 9 ng/L   CBC Auto Differential   Result Value Ref Range    WBC 4.3 (L) 4.5 - 11.5 E9/L    RBC 3.98 3.50 - 5.50 E12/L    Hemoglobin 12.0 11.5 - 15.5 g/dL    Hematocrit 37.9 34.0 - 48.0 %    MCV 95.2 80.0 - 99.9 fL    MCH 30.2 26.0 - 35.0 pg    MCHC 31.7 (L) 32.0 - 34.5 %    RDW 14.7 11.5 - 15.0 fL    Platelets 538 062 - 552 E9/L    MPV 11.1 7.0 - 12.0 fL    Neutrophils % 43.3 43.0 - 80.0 %    Immature Granulocytes % 0.5 0.0 - 5.0 %    Lymphocytes % 45.8 (H) 20.0 - 42.0 %    Monocytes % 8.8 2.0 - 12.0 %    Eosinophils % 0.9 0.0 - 6.0 %    Basophils % 0.7 0.0 - 2.0 %    Neutrophils Absolute 1.87 1.80 - 7.30 E9/L    Immature Granulocytes # 0.02 E9/L    Lymphocytes Absolute 1.98 1.50 - 4.00 E9/L    Monocytes Absolute 0.38 0.10 - 0.95 E9/L    Eosinophils Absolute 0.04 (L) 0.05 - 0.50 E9/L    Basophils Absolute 0.03 0.00 - 0.20 E9/L   Comprehensive Metabolic Panel   Result Value Ref Range    Sodium 140 132 - 146 mmol/L    Potassium 4.1 3.5 - 5.0 mmol/L    Chloride 104 98 - 107 mmol/L    CO2 28 22 - 29 mmol/L    Anion Gap 8 7 - 16 mmol/L    Glucose 144 (H) 74 - 99 mg/dL    BUN 18 6 - 20 mg/dL    CREATININE 1.2 (H) 0.5 - 1.0 mg/dL    GFR Non-African American 49 >=60 mL/min/1.73    GFR African American 59     Calcium 9.2 8.6 - 10.2 mg/dL    Total Protein 6.9 6.4 - 8.3 g/dL    Albumin 3.9 3.5 - 5.2 g/dL    Total Bilirubin 0.3 0.0 - 1.2 mg/dL    Alkaline Phosphatase 43 35 - 104 U/L    ALT 24 0 - 32 U/L    AST 26 0 - 31 U/L   Brain Natriuretic Peptide   Result Value Ref Range    Pro- 0 - 125 pg/mL   Urinalysis   Result Value Ref Range    Color, UA Yellow Straw/Yellow    Clarity, UA Clear Clear    Glucose, Ur Negative Negative mg/dL    Bilirubin Urine Negative Negative    Ketones, Urine Negative Negative mg/dL    Specific Gravity, UA 1.015 1.005 - 1.030    Blood, Urine Negative Negative    pH, UA 7.5 5.0 - 9.0    Protein, UA Negative Negative mg/dL    Urobilinogen, Urine 0.2 <2.0 E.U./dL    Nitrite, Urine Negative Negative    Leukocyte Esterase, Urine Negative Negative   EKG 12 Lead   Result Value Ref Range    Ventricular Rate 80 BPM    Atrial Rate 80 BPM    P-R Interval 162 ms    QRS Duration 78 ms    Q-T Interval 404 ms    QTc Calculation (Bazett) 465 ms    P Axis 53 degrees    R Axis 18 degrees    T Axis 45 degrees       RADIOLOGY:  Interpreted by Radiologist.  CT ABDOMEN PELVIS WO CONTRAST Additional Contrast? None   Final Result   1. There is no acute intra-abdominal or intrapelvic pathology. Specifically,   there are no findings of obstructive uropathy         US DUP LOWER EXTREMITY LEFT [place]   Final Result   No evidence of DVT in the left lower extremity. XR CHEST PORTABLE   Final Result   No acute process. Infusion port in place.              EKG:  This EKG is signed and interpreted by the EP. EKG shows normal sinus rhythm at 80 bpm.  Low voltage QRS. Nonspecific ST-T wave changes noted. No STEMI.      ------------------------- NURSING NOTES AND VITALS REVIEWED ---------------------------   The nursing notes within the ED encounter and vital signs as below have been reviewed by myself. /80   Pulse 76   Temp 97.5 °F (36.4 °C)   Resp 18   Ht 5' 2\" (1.575 m)   Wt 220 lb (99.8 kg)   SpO2 94%   BMI 40.24 kg/m²   Oxygen Saturation Interpretation: Normal    The patients available past medical records and past encounters were reviewed. ------------------------------ ED COURSE/MEDICAL DECISION MAKING----------------------  Medications   0.9 % sodium chloride bolus (0 mLs Intravenous Stopped 8/7/21 0746)   droperidol (INAPSINE) injection 1.25 mg (1.25 mg Intravenous Given 8/7/21 4924)         ED COURSE:       Medical Decision Making: This is a 24-year-old female presented to the ED for elevated blood pressure and leg swelling. Patient underwent laboratory work-up which showed a normal CBC except for white count 4.3. Negative troponin. Chemistry was unremarkable except for creatinine 1.2. BNP normal at 1 3 urinalysis negative EKG showed no ischemic findings. Ultrasound shows no signs of DVT. Chest x-ray unremarkable. CT head pelvis shows no acute pathology. Patient given IV fluids as well as droperidol. On reevaluation blood pressure significantly improved after pain control. Headache also improved. Patient was treated symptomatically with follow-up with neurology as scheduled. Return precautions given. Patient agrees with plan for discharge home.     I, Dr. Nicholas Quiros, am the primary provider for this encounter    This patient's ED course included: a personal history and physicial examination, re-evaluation prior to disposition and multiple bedside re-evaluations    This patient has remained hemodynamically stable during their ED

## 2021-08-08 LAB
EKG ATRIAL RATE: 80 BPM
EKG P AXIS: 53 DEGREES
EKG P-R INTERVAL: 162 MS
EKG Q-T INTERVAL: 404 MS
EKG QRS DURATION: 78 MS
EKG QTC CALCULATION (BAZETT): 465 MS
EKG R AXIS: 18 DEGREES
EKG T AXIS: 45 DEGREES
EKG VENTRICULAR RATE: 80 BPM

## 2021-08-09 ENCOUNTER — TELEPHONE (OUTPATIENT)
Dept: CARDIOLOGY CLINIC | Age: 46
End: 2021-08-09

## 2021-08-09 NOTE — TELEPHONE ENCOUNTER
I am okay with that last BP. Observe for now. Erin Mueller D.O.   Cardiologist  Cardiology, [facility]

## 2021-08-23 ENCOUNTER — TELEPHONE (OUTPATIENT)
Dept: CARDIOLOGY CLINIC | Age: 46
End: 2021-08-23

## 2021-08-23 ENCOUNTER — TELEPHONE (OUTPATIENT)
Dept: ADMINISTRATIVE | Age: 46
End: 2021-08-23

## 2021-08-23 NOTE — TELEPHONE ENCOUNTER
Home health nurse states patient gained 8lbs in a week,yesterday she was having SOB and lower extremity edema and was taken to [facility] where she was given IV lasix 40mg.  Her bp today is 164/90 and she is taking 20mg lasix daily, please advise

## 2021-08-24 NOTE — TELEPHONE ENCOUNTER
Increase lasix to 40 mg daily. Lynn Castellon D.O.   Cardiologist  Cardiology, [facility]

## 2021-08-26 NOTE — TELEPHONE ENCOUNTER
Patient states she is still having edema and SOB, she was having difficulty breathing last night so she went to [place] [facility] today, labs and chest X-ray were done(scanned in chart), she was ordered to f/u with cardiology, [facility] is scheduled for Monday 8/30

## 2021-08-26 NOTE — TELEPHONE ENCOUNTER
She can drink water liberally but just needs to watch salt intake if she starts swelling. Dont want her to get too low on fluids either. Forest Gonzales D.O.   Cardiologist  Cardiology, [facility]

## 2021-08-30 ENCOUNTER — OFFICE VISIT (OUTPATIENT)
Dept: CARDIOLOGY CLINIC | Age: 46
End: 2021-08-30
Payer: MEDICAID

## 2021-08-30 VITALS
HEIGHT: 62 IN | HEART RATE: 88 BPM | SYSTOLIC BLOOD PRESSURE: 138 MMHG | WEIGHT: 220 LBS | RESPIRATION RATE: 12 BRPM | BODY MASS INDEX: 40.48 KG/M2 | DIASTOLIC BLOOD PRESSURE: 88 MMHG

## 2021-08-30 DIAGNOSIS — I10 ESSENTIAL HYPERTENSION: Primary | ICD-10-CM

## 2021-08-30 PROCEDURE — G8427 DOCREV CUR MEDS BY ELIG CLIN: HCPCS | Performed by: INTERNAL MEDICINE

## 2021-08-30 PROCEDURE — 1036F TOBACCO NON-USER: CPT | Performed by: INTERNAL MEDICINE

## 2021-08-30 PROCEDURE — 99214 OFFICE O/P EST MOD 30 MIN: CPT | Performed by: INTERNAL MEDICINE

## 2021-08-30 PROCEDURE — 93000 ELECTROCARDIOGRAM COMPLETE: CPT | Performed by: INTERNAL MEDICINE

## 2021-08-30 PROCEDURE — G8417 CALC BMI ABV UP PARAM F/U: HCPCS | Performed by: INTERNAL MEDICINE

## 2021-08-30 RX ORDER — FUROSEMIDE 20 MG/1
TABLET ORAL
Qty: 90 TABLET | Refills: 3 | Status: SHIPPED | OUTPATIENT
Start: 2021-08-30

## 2021-08-30 RX ORDER — EPINEPHRINE 0.3 MG/.3ML
INJECTION SUBCUTANEOUS
COMMUNITY
Start: 2021-07-23

## 2021-08-30 RX ORDER — ACETAZOLAMIDE 250 MG/1
250 TABLET ORAL
COMMUNITY
Start: 2021-08-19

## 2021-08-30 RX ORDER — LANOLIN ALCOHOL/MO/W.PET/CERES
CREAM (GRAM) TOPICAL
COMMUNITY
Start: 2021-03-05

## 2021-08-30 RX ORDER — GALCANEZUMAB 120 MG/ML
INJECTION, SOLUTION SUBCUTANEOUS
COMMUNITY
Start: 2021-07-20

## 2021-08-30 RX ORDER — TIZANIDINE 2 MG/1
2 TABLET ORAL
COMMUNITY
Start: 2021-03-23

## 2021-08-30 NOTE — PROGRESS NOTES
CHIEF COMPLAINT: YADAV/POTS/Abnormal EKG    HISTORY OF PRESENT ILLNESS: Patient is a 39 y.o. female seen at the request of Boby Jean DO. Patient seen for abnormal EKG and YADAV. Hx of POTS and multiple risk factors including DM. Issues with edema and labile BP. Some dizziness. No overt CP.     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     Connective tissue disease (HCC)     Cyst of right breast     DDD (degenerative disc disease), cervical     Depression     Diabetes mellitus (HCC)     Dysphagia     Esophagus disorder     needs stretched ocassionally    Fibromyalgia     Gastroparesis     GERD (gastroesophageal reflux disease)     Headache     Hematuria     Hyperlipidemia     Hypertension     Hypothyroidism     IBS (irritable bowel syndrome)     with constipation and diarrhea    Immune deficiency disorder (HCC)     Insomnia     Kidney stones     Meningitis     x4--- twice after IVIG     Meningitis     PAULINE on CPAP     cpap dependence    PCOS (polycystic ovarian syndrome)     POTS (postural orthostatic tachycardia syndrome)     Problems with swallowing     due restrictive esophagus    Pseudotumor cerebri     in 2008--- treated with LP; allergy to diamox    Raynaud's disease     Rectal bleeding     RLS (restless legs syndrome)     Scleroderma ([address]     Sjogren's disease ([address]     Thyroid nodule     TIA (transient ischemic attack)        Patient Active Problem List   Diagnosis    Vision loss, bilateral    Type 2 diabetes mellitus without complication, with long-term current use of insulin (HCC)    Thyroid nodule    Solitary cyst of right breast    Sjogren's syndrome with keratoconjunctivitis sicca ([address]    Retention of urine    Restless legs    Raynaud's phenomenon without gangrene    Primary fibromyalgia syndrome    Postural Anaphylaxis     Whole body flipping around anxious involuntary muscles spasms    Rocephin [Ceftriaxone] Anaphylaxis     SOB hives turned red during gallbladder    Rosemary Oil Anaphylaxis     SOB facial swelling    Strawberry Flavor Anaphylaxis     Allergy to strawberry fruit and juice    Topamax [Topiramate] Anaphylaxis     Throat swelling    Triptans Anaphylaxis     None due to Raynaud's  ( Triptans cause a stroke)    [place] [Macadamia Nut Oil] Anaphylaxis     SOB itchey mouth hives    Abilify [Aripiprazole]      Fevers and Tremors    Aspartame And Phenylalanine Diarrhea     Blood sugar Les, Diarrhea    Cefdinir      Itchy throat    Darvon [Propoxyphene]      Darvocet itchy hives    Dilaudid [Hydromorphone Hcl]      Intolerance nausea instant    Doxycycline      Rash itching    Effexor [Venlafaxine]      Fevers chills    Gluten Meal Other (See Comments)     Rashes constipation gastric discomfort    Hydrochlorothiazide      itchy hives    Iron      ichy hive ( can tolerate ferrous gluconate)    Meclizine Swelling and Other (See Comments)     Swelling of the eyelids    Milk-Related Compounds      Dairy rashes constipation  Gastric dirrahea    Sulfa Antibiotics      SOB itchy hives    Tetracyclines & Related      Itchy  rash    Wheat Bran Itching, Nausea And Vomiting, Dermatitis and Rash    Zithromax [Azithromycin]      SOB rash itchy    Barbiturates     Fenofibrate Other (See Comments)     Other reaction(s): Other    Metformin      Other reaction(s): Dyspepsia, Dyspepsia    Strawberry Extract Other (See Comments)     Other reaction(s): white blisters in mouth, shortness of breath    Sulfonylureas     Thiazide-Type Diuretics     Tobramycin     Vancomycin      Other reaction(s):  Other    Adhesive Tape Rash    Betamethasone Nausea And Vomiting       Current Outpatient Medications   Medication Sig Dispense Refill    acetaZOLAMIDE (DIAMOX) 250 MG tablet Take 250 mg by mouth       EPINEPHrine (EPIPEN) 0.3 MG/0.3ML SOAJ injection INJECT INTO THE MUSCLE ONCE AS NEEDED FOR ANAPHYLAXIS      EMGALITY 120 MG/ML SOAJ ADMINISTER 1 ML UNDER THE SKIN EVERY 30 DAYS      melatonin 3 MG TABS tablet Take 1 tablet with dinner and 1 tablet at bedtime (night)      tiZANidine (ZANAFLEX) 2 MG tablet Take 2 mg by mouth      furosemide (LASIX) 20 MG tablet Take 3 times per week. 90 tablet 3    Cinnamon 500 MG CAPS Take 600 mg by mouth 2 times daily      NONFORMULARY 300 mg 3 times daily Carlos - Holy Basal      cimetidine (TAGAMET) 300 MG tablet Take 300 mg by mouth 2 times daily       levothyroxine (SYNTHROID) 50 MCG tablet Take 2.5 tablets by mouth Five times weekly Given Monday,Tuesday,Wednesday,Thursday,Friday 90 tablet 1    Immune Globulin, Human, (GAMMAGARD IV) Infuse intravenously 45 gm per port once a month      propranolol (INDERAL) 10 MG tablet TAKE 1 TABLET BY MOUTH THREE TIMES DAILY 90 tablet 3    ascorbic acid (VITAMIN C) 500 MG tablet TAKE 1 TABLET BY MOUTH EVERY DAY      divalproex (DEPAKOTE ER) 500 MG extended release tablet TAKE 1 TABLET BY MOUTH THREE TIMES DAILY      zinc 50 MG CAPS Take 50 mg by mouth daily 30 capsule 0    polyethylene glycol (GLYCOLAX) 17 g packet Take 17 g by mouth 2 times daily as needed for Constipation 527 g 2    Polyvinyl Alcohol-Povidone (REFRESH OP) Apply to eye Artificial tears      insulin glargine (LANTUS SOLOSTAR) 100 UNIT/ML injection pen Injects 8 units in the morning and 14 units at night      Magnesium 500 MG TABS Take 1 tablet by mouth 2 times daily      insulin lispro, 1 Unit Dial, (HUMALOG KWIKPEN) 100 UNIT/ML SOPN Inject into the skin daily Sliding scale: 150-200- 4 units, then add 2 units for every 50 up to a max of 12 units.       Probiotic Product (PROBIOTIC DAILY PO) daily       aspirin 81 MG EC tablet Take 81 mg by mouth daily       rOPINIRole (REQUIP) 0.5 MG tablet Take 1 tablet by mouth See Admin Instructions 1 tablet - breakfast  2 tablets - supper (Patient taking differently: Take 0.5 mg by mouth See Admin Instructions 1 tablet qAM and 2 tablets qPM) 270 tablet 1    pantoprazole (PROTONIX) 40 MG tablet Take 1 tablet by mouth every morning (before breakfast) (Patient taking differently: Take 40 mg by mouth 2 times daily ) 90 tablet 1    folic acid (FOLVITE) 1 MG tablet Take 1 tablet by mouth Daily with lunch 90 tablet 1    sodium chloride (OCEAN, BABY AYR) 0.65 % nasal spray 1 spray by Nasal route 4 times daily as needed for Congestion      azelastine (ASTELIN) 0.1 % nasal spray 1 spray by Nasal route 2 times daily      diphenhydrAMINE (BENYLIN) 12.5 MG/5ML liquid Take 25 mg by mouth 4 times daily as needed for Allergies (migrane break through)      Artificial Saliva (BIOTENE DRY MOUTH MOISTURIZING MT) Take 1 spray by mouth every 3 hours      OIL OF OREGANO PO Take 60 mg by mouth daily (with breakfast)       magnesium hydroxide (MILK OF MAGNESIA) 400 MG/5ML suspension Take 30 mLs by mouth 4 times daily as needed for Constipation      SENNA LEAVES PO Take 470 mg by mouth three times daily       promethazine (PHENERGAN) 25 MG tablet Take 25 mg by mouth every 6 hours as needed for Nausea       NONFORMULARY Take 2 capsules by mouth 2 times daily (with meals) Turmeric Ginger      potassium chloride (KLOR-CON) 20 MEQ packet Take 20 mEq by mouth daily 90 packet 2    clonazePAM (KLONOPIN) 0.5 MG tablet Take 0.5 mg by mouth 2 times daily. Tawana Jaime econazole nitrate 1 % cream Apply topically daily as needed (Apply under breasts and folds)       fluticasone (FLONASE) 50 MCG/ACT nasal spray 1 spray by Nasal route 2 times daily       fluticasone-salmeterol (ADVAIR HFA) 230-21 MCG/ACT inhaler Inhale 2 puffs into the lungs 2 times daily      ipratropium-albuterol (DUONEB) 0.5-2.5 (3) MG/3ML SOLN nebulizer solution Inhale 1 vial into the lungs every 4 hours as needed for Shortness of Breath       montelukast (SINGULAIR) 10 MG tablet Take 10 mg by mouth nightly      ondansetron (ZOFRAN-ODT) 8 MG TBDP disintegrating tablet Take 8 mg by mouth every 8 hours as needed for Nausea or Vomiting        No current facility-administered medications for this visit. Social History     Socioeconomic History    Marital status:      Spouse name: Not on file    Number of children: 0    Years of education: 12    Highest education level: Bachelor's degree (e.g., BA, AB, BS)   Occupational History    Not on file   Tobacco Use    Smoking status: Never Smoker    Smokeless tobacco: Never Used   Vaping Use    Vaping Use: Never used   Substance and Sexual Activity    Alcohol use: No    Drug use: No    Sexual activity: Not Currently     Partners: Male   Other Topics Concern    Not on file   Social History Narrative    Patient is trying to exercise by walking her new puppy around the house. Plan is for puppy to become her support/seeing eye dog. [employer] will assist in training after puppy learns the basics. Patient does not visit with friends or relatives d/t COVID     Social Determinants of Health     Financial Resource Strain: Low Risk     Difficulty of Paying Living Expenses: Not hard at all   Food Insecurity: No Food Insecurity    Worried About Running Out of Food in the Last Year: Never true    [address] in the Last Year: Never true   Transportation Needs: No Transportation Needs    Lack of Transportation (Medical): No    Lack of Transportation (Non-Medical): No   Physical Activity: Inactive    Days of Exercise per Week: 0 days    Minutes of Exercise per Session: 0 min   Stress: Stress Concern Present    Feeling of Stress : To some extent   Social Connections: Moderately Integrated    Frequency of Communication with Friends and Family:  Three times a week    Frequency of Social Gatherings with Friends and Family: Twice a week    Attends [employer] Services: 1 to 4 times per year   [employer] of [employer]s or Organizations: No    Attends Club or Organization Meetings: Never    Marital Status:    Intimate Partner Violence: Not At Risk    Fear of Current or Ex-Partner: No    Emotionally Abused: No    Physically Abused: No    Sexually Abused: No       Family History   Problem Relation Age of Onset    Alzheimer's Disease Mother     Alzheimer's Disease Father     Heart Disease Father 59        MI  Stents    Stroke Father     Stroke Sister     Osteoporosis Sister     Hypertension Brother     High Cholesterol Brother     Alzheimer's Disease Paternal Aunt     Alzheimer's Disease Paternal Uncle        Review of Systems:  Heart: as above   Lungs: as above   Eyes: denies changes in vision or discharge. Ears: denies changes in hearing or pain. Nose: denies epistaxis or masses   Throat: denies sore throat or trouble swallowing. Neuro: denies numbness, tingling, tremors. Skin: denies rashes or itching. : denies hematuria, dysuria   GI: denies vomiting, diarrhea   Psych: denies mood changed, anxiety, depression. All other systems negative. Physical Exam   /88   Pulse 88   Resp 12   Ht 5' 2\" (1.575 m)   Wt 220 lb (99.8 kg)   BMI 40.24 kg/m²   Constitutional: Oriented to person, place, and time. Well-developed and well-nourished. No distress. Head: Normocephalic and atraumatic. Eyes: EOM are normal. Pupils are equal, round, and reactive to light. Neck: Normal range of motion. Neck supple. No hepatojugular reflux and no JVD present. Carotid bruit is not present. No tracheal deviation present. No thyromegaly present. Cardiovascular: Normal rate, regular rhythm, normal heart sounds and intact distal pulses. Exam reveals no gallop and no friction rub. No murmur heard. Pulmonary/Chest: Effort normal and breath sounds normal. No respiratory distress. No wheezes. No rales. No tenderness. Abdominal: Soft. Bowel sounds are normal. No distension and no mass. No tenderness. No rebound and no guarding. Musculoskeletal: Normal range of motion. No edema and no tenderness. Lymphadenopathy:   No cervical adenopathy. No groin adenopathy. Neurological: Alert and oriented to person, place, and time. Skin: Skin is warm and dry. No rash noted. Not diaphoretic. No erythema. Psychiatric: Normal mood and affect. Behavior is normal.     EKG personally reviewed 08/30/21:  normal sinus rhythm, nonspecific ST and T waves changes, Q waves in anteroseptal.    ECHO CONCLUSIONS 12/18/2020:  - Exam indication: Systemic Sclerosis, Pulmonary Disease, SOB  - The left ventricle is normal in size. Left ventricular systolic function is normal. EF = 74 ± 5% (2D biplane) Normal left ventricular diastolic function.  - The right ventricle is normal in size. Right ventricular systolic function is normal.  _ No significant valvular abnormality identified.  - Exam was compared with the prior  echocardiographic exam performed on 4/11/2018. There is no significant change.   Electronically signed by Marsha Egan on 12/18/2020 at 7:14:53 PM    ASSESSMENT AND PLAN:  Patient Active Problem List   Diagnosis    Vision loss, bilateral    Type 2 diabetes mellitus without complication, with long-term current use of insulin ([address]    Thyroid nodule    Solitary cyst of right breast    Sjogren's syndrome with keratoconjunctivitis sicca ([address]    Retention of urine    Restless legs    Raynaud's phenomenon without gangrene    Primary fibromyalgia syndrome    Postural orthostatic tachycardia syndrome    PAULINE on CPAP    Obesity, Class II, BMI 35-39.9    Hyperlipidemia    Other dysphagia    Lung nodule    Long term current use of insulin (HCC)    Insomnia    Intractable chronic migraine without aura and without status migrainosus    Hypothyroidism    Hypogammaglobulinemia (HCC)    History of renal calculi    Gastroparesis    Gastroesophageal reflux disease    Essential hypertension    DDD (degenerative disc disease)    CVID (common variable immunodeficiency) [facility]    Migraine    Irritable bowel syndrome    Constipation    Diarrhea    Benign intracranial hypertension    Depression    Anxiety    Anemia    Polycystic disease, ovaries    Cervicalgia    Asthma    Connective tissue disorder (HCC)    Stroke-like symptoms    TIA (transient ischemic attack)    Weakness of extremity    Subcutaneous nodule of abdominal wall    Flu vaccine need    Lower abdominal pain    Acute upper respiratory infection    Acute urinary tract infection    Optic neuritis    Immune deficiency disorder (HCC)    Wrist sprain, right, subsequent encounter    Viral pneumonia     1. YADAV/Abnormal EKG:     Multiple risk factors. Pharm stress low risk 1/27/2021.     2. POTS: Make lasix 3 times per week. 3. DM    4. PAULINE with CPAP    5. Lipids    6. Neurological symptoms/TIA. Priscila Mccollum D.O.   Cardiologist  Cardiology, [facility]

## 2021-08-31 ENCOUNTER — TELEPHONE (OUTPATIENT)
Dept: ADMINISTRATIVE | Age: 46
End: 2021-08-31

## 2021-09-03 NOTE — TELEPHONE ENCOUNTER
Jorden Approval of Emgality  #[id number], Valid 2/26/2021 - 2/26/2022.   In Media  Electronically signed by Larry Choe on 2/26/21 at 10:38 AM EST Statement Selected

## 2021-09-13 ENCOUNTER — TELEPHONE (OUTPATIENT)
Dept: ADMINISTRATIVE | Age: 46
End: 2021-09-13

## 2021-09-13 NOTE — TELEPHONE ENCOUNTER
Pt has a 2 wk f/u on 9/21 she has to go to [place] to a urologist. There are no other openings.   Please advise pt. [phone]

## 2021-10-07 ENCOUNTER — HOSPITAL ENCOUNTER (OUTPATIENT)
Dept: ULTRASOUND IMAGING | Age: 46
Discharge: HOME | End: 2021-10-09
Payer: MEDICAID

## 2021-10-07 DIAGNOSIS — R16.1 SPLENOMEGALY: ICD-10-CM

## 2021-10-07 PROCEDURE — 76700 US EXAM ABDOM COMPLETE: CPT

## 2021-10-20 ENCOUNTER — OFFICE VISIT (OUTPATIENT)
Dept: CARDIOLOGY CLINIC | Age: 46
End: 2021-10-20
Payer: MEDICAID

## 2021-10-20 VITALS
BODY MASS INDEX: 41.97 KG/M2 | SYSTOLIC BLOOD PRESSURE: 106 MMHG | RESPIRATION RATE: 18 BRPM | HEART RATE: 89 BPM | WEIGHT: 228.1 LBS | DIASTOLIC BLOOD PRESSURE: 68 MMHG | HEIGHT: 62 IN

## 2021-10-20 DIAGNOSIS — I10 ESSENTIAL HYPERTENSION: Primary | ICD-10-CM

## 2021-10-20 PROCEDURE — G8427 DOCREV CUR MEDS BY ELIG CLIN: HCPCS | Performed by: INTERNAL MEDICINE

## 2021-10-20 PROCEDURE — 93000 ELECTROCARDIOGRAM COMPLETE: CPT | Performed by: INTERNAL MEDICINE

## 2021-10-20 PROCEDURE — 99214 OFFICE O/P EST MOD 30 MIN: CPT | Performed by: INTERNAL MEDICINE

## 2021-10-20 PROCEDURE — G8417 CALC BMI ABV UP PARAM F/U: HCPCS | Performed by: INTERNAL MEDICINE

## 2021-10-20 PROCEDURE — G8484 FLU IMMUNIZE NO ADMIN: HCPCS | Performed by: INTERNAL MEDICINE

## 2021-10-20 PROCEDURE — 1036F TOBACCO NON-USER: CPT | Performed by: INTERNAL MEDICINE

## 2021-10-20 RX ORDER — AMITRIPTYLINE HYDROCHLORIDE 25 MG/1
25 TABLET, FILM COATED ORAL NIGHTLY
COMMUNITY

## 2021-10-20 NOTE — PROGRESS NOTES
CHIEF COMPLAINT: YADAV/POTS/Abnormal EKG    HISTORY OF PRESENT ILLNESS: Patient is a 39 y.o. female seen at the request of Alexandra Siddiqui DO. Patient seen for abnormal EKG and YADAV. Hx of POTS and multiple risk factors including DM. Recent admission at [facility] for COVID    Edema reasonable. Improved BP. Some YADAV. No overt CP. No palpitations.     Past Medical History:   Diagnosis Date    Abnormal Pap smear of cervix     hpv age 23   [facility] Anemia     Anxiety     Asthma     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     Connective tissue disease (HCC)     Cyst of right breast     DDD (degenerative disc disease), cervical     Depression     Diabetes mellitus (HCC)     Dysphagia     Esophagus disorder     needs stretched ocassionally    Fibromyalgia     Gastroparesis     GERD (gastroesophageal reflux disease)     Headache     Hematuria     Hyperlipidemia     Hypertension     Hypothyroidism     IBS (irritable bowel syndrome)     with constipation and diarrhea    Immune deficiency disorder ([address]     Insomnia     Kidney stones     Meningitis     x4--- twice after IVIG     Meningitis     PAULINE on CPAP     cpap dependence    PCOS (polycystic ovarian syndrome)     POTS (postural orthostatic tachycardia syndrome)     Problems with swallowing     due restrictive esophagus    Pseudotumor cerebri     in 2008--- treated with LP; allergy to diamox    Raynaud's disease     Rectal bleeding     RLS (restless legs syndrome)     Scleroderma ([address]     Sjogren's disease ([address]     Thyroid nodule     TIA (transient ischemic attack)        Patient Active Problem List   Diagnosis    Vision loss, bilateral    Type 2 diabetes mellitus without complication, with long-term current use of insulin (HCC)    Thyroid nodule    Solitary cyst of right breast    Sjogren's syndrome with keratoconjunctivitis sicca ([address]    Retention of urine    Restless legs    Raynaud's phenomenon without gangrene    Primary fibromyalgia syndrome    Postural orthostatic tachycardia syndrome    PAULINE on CPAP    Obesity, Class II, BMI 35-39.9    Hyperlipidemia    Other dysphagia    Lung nodule    Long term current use of insulin ([facility])    Insomnia    Intractable chronic migraine without aura and without status migrainosus    Hypothyroidism    Hypogammaglobulinemia ([facility])    History of renal calculi    Gastroparesis    Gastroesophageal reflux disease    Essential hypertension    DDD (degenerative disc disease)    CVID (common variable immunodeficiency) ([facility])    Migraine    Irritable bowel syndrome    Constipation    Diarrhea    Benign intracranial hypertension    Depression    Anxiety    Anemia    Polycystic disease, ovaries    Cervicalgia    Asthma    Connective tissue disorder ([facility])    Stroke-like symptoms    TIA (transient ischemic attack)    Weakness of extremity    Subcutaneous nodule of abdominal wall    Flu vaccine need    Lower abdominal pain    Acute upper respiratory infection    Acute urinary tract infection    Optic neuritis    Immune deficiency disorder ([facility])    Wrist sprain, right, subsequent encounter    Viral pneumonia       Allergies   Allergen Reactions    Cymbalta [Duloxetine Hcl]      suicidal ideation     Diamox [Acetazolamide] Shortness Of Breath and Rash    Doxepin Anaphylaxis     Itchy sore throat problems with swallowing    Duloxetine Anaphylaxis     Other reaction(s): suicidal ideation    Grapeseed Extract [Nutritional Supplements] Anaphylaxis     Grapes ( red dye     Levaquin [Levofloxacin In D5w] Anaphylaxis     Foreman face lips swelling just Levaquin tolerated D5W)    Lipitor [Atorvastatin]      Causes muscle pain    Other Anaphylaxis     Strawberries hives in the mouth SOB    Pecan Nut (Diagnostic) Anaphylaxis     SOB Hives in the mouth itch    Prochlorperazine Anaphylaxis      HIGH Compazine involuntary muscle spasms low doses tolerated)    Red Dye Anaphylaxis    Reglan [Metoclopramide] Anaphylaxis     Whole body flipping around anxious involuntary muscles spasms    Rocephin [Ceftriaxone] Anaphylaxis     SOB hives turned red during gallbladder    Rosemary Oil Anaphylaxis     SOB facial swelling    Strawberry Flavor Anaphylaxis     Allergy to strawberry fruit and juice    Topamax [Topiramate] Anaphylaxis     Throat swelling    Triptans Anaphylaxis     None due to Raynaud's  ( Triptans cause a stroke)    Sorento [Macadamia Nut Oil] Anaphylaxis     SOB itchey mouth hives    Abilify [Aripiprazole]      Fevers and Tremors    Aspartame And Phenylalanine Diarrhea     Blood sugar Les, Diarrhea    Cefdinir      Itchy throat    Darvon [Propoxyphene]      Darvocet itchy hives    Dilaudid [Hydromorphone Hcl]      Intolerance nausea instant    Doxycycline      Rash itching    Effexor [Venlafaxine]      Fevers chills    Gluten Meal Other (See Comments)     Rashes constipation gastric discomfort    Hydrochlorothiazide      itchy hives    Iron      ichy hive ( can tolerate ferrous gluconate)    Meclizine Swelling and Other (See Comments)     Swelling of the eyelids    Milk-Related Compounds      Dairy rashes constipation  Gastric dirrahea    Sulfa Antibiotics      SOB itchy hives    Tetracyclines & Related      Itchy  rash    Wheat Bran Itching, Nausea And Vomiting, Dermatitis and Rash    Zithromax [Azithromycin]      SOB rash itchy    Barbiturates     Fenofibrate Other (See Comments)     Other reaction(s): Other    Metformin      Other reaction(s): Dyspepsia, Dyspepsia    Strawberry Extract Other (See Comments)     Other reaction(s): white blisters in mouth, shortness of breath    Sulfonylureas     Thiazide-Type Diuretics     Tobramycin     Vancomycin      Other reaction(s):  Other    Adhesive Tape Rash    Betamethasone Nausea And Vomiting       Current Outpatient Medications   Medication Sig Dispense Refill    amitriptyline (ELAVIL) 25 MG tablet Take 25 mg by mouth nightly      acetaZOLAMIDE (DIAMOX) 250 MG tablet Take 250 mg by mouth       EPINEPHrine (EPIPEN) 0.3 MG/0.3ML SOAJ injection INJECT INTO THE MUSCLE ONCE AS NEEDED FOR ANAPHYLAXIS      EMGALITY 120 MG/ML SOAJ ADMINISTER 1 ML UNDER THE SKIN EVERY 30 DAYS      melatonin 3 MG TABS tablet Take 1 tablet with dinner and 1 tablet at bedtime (night)      tiZANidine (ZANAFLEX) 2 MG tablet Take 2 mg by mouth      furosemide (LASIX) 20 MG tablet Take 3 times per week. 90 tablet 3    Cinnamon 500 MG CAPS Take 600 mg by mouth 2 times daily      NONFORMULARY 300 mg 3 times daily Carlos - Holy Basal      cimetidine (TAGAMET) 300 MG tablet Take 300 mg by mouth 2 times daily       levothyroxine (SYNTHROID) 50 MCG tablet Take 2.5 tablets by mouth Five times weekly Given Monday,Tuesday,Wednesday,Thursday,Friday 90 tablet 1    Immune Globulin, Human, (GAMMAGARD IV) Infuse intravenously 45 gm per port once a month      propranolol (INDERAL) 10 MG tablet TAKE 1 TABLET BY MOUTH THREE TIMES DAILY 90 tablet 3    ascorbic acid (VITAMIN C) 500 MG tablet TAKE 1 TABLET BY MOUTH EVERY DAY      divalproex (DEPAKOTE ER) 500 MG extended release tablet TAKE 1 TABLET BY MOUTH THREE TIMES DAILY      zinc 50 MG CAPS Take 50 mg by mouth daily 30 capsule 0    polyethylene glycol (GLYCOLAX) 17 g packet Take 17 g by mouth 2 times daily as needed for Constipation 527 g 2    Polyvinyl Alcohol-Povidone (REFRESH OP) Apply to eye Artificial tears      insulin glargine (LANTUS SOLOSTAR) 100 UNIT/ML injection pen Injects 14 units in the morning and 22 units at night      Magnesium 500 MG TABS Take 1 tablet by mouth 2 times daily      insulin lispro, 1 Unit Dial, (HUMALOG KWIKPEN) 100 UNIT/ML SOPN Inject into the skin daily Sliding scale: 150-200- 4 units, then add 2 units for every 50 up to a max of 12 units.       Probiotic Product (PROBIOTIC DAILY PO) daily       aspirin 81 MG EC tablet Take 81 mg by mouth daily       rOPINIRole (REQUIP) 0.5 MG tablet Take 1 tablet by mouth See Admin Instructions 1 tablet - breakfast  2 tablets - supper (Patient taking differently: Take 0.5 mg by mouth See Admin Instructions 1 tablet qAM and 2 tablets qPM) 270 tablet 1    pantoprazole (PROTONIX) 40 MG tablet Take 1 tablet by mouth every morning (before breakfast) (Patient taking differently: Take 40 mg by mouth 2 times daily ) 90 tablet 1    folic acid (FOLVITE) 1 MG tablet Take 1 tablet by mouth Daily with lunch 90 tablet 1    sodium chloride (OCEAN, BABY AYR) 0.65 % nasal spray 1 spray by Nasal route 4 times daily as needed for Congestion      azelastine (ASTELIN) 0.1 % nasal spray 1 spray by Nasal route 2 times daily      diphenhydrAMINE (BENYLIN) 12.5 MG/5ML liquid Take 25 mg by mouth 4 times daily as needed for Allergies (migrane break through)      Artificial Saliva (BIOTENE DRY MOUTH MOISTURIZING MT) Take 1 spray by mouth every 3 hours      OIL OF OREGANO PO Take 60 mg by mouth daily (with breakfast)       magnesium hydroxide (MILK OF MAGNESIA) 400 MG/5ML suspension Take 30 mLs by mouth 4 times daily as needed for Constipation      SENNA LEAVES PO Take 470 mg by mouth three times daily       promethazine (PHENERGAN) 25 MG tablet Take 25 mg by mouth every 6 hours as needed for Nausea       NONFORMULARY Take 2 capsules by mouth 2 times daily (with meals) Turmeric Ginger      clonazePAM (KLONOPIN) 0.5 MG tablet Take 0.5 mg by mouth 2 times daily. Tera Mccormick econazole nitrate 1 % cream Apply topically daily as needed (Apply under breasts and folds)       fluticasone (FLONASE) 50 MCG/ACT nasal spray 1 spray by Nasal route 2 times daily       fluticasone-salmeterol (ADVAIR HFA) 230-21 MCG/ACT inhaler Inhale 2 puffs into the lungs 2 times daily      ipratropium-albuterol (DUONEB) 0.5-2.5 (3) MG/3ML SOLN nebulizer solution Inhale 1 vial into the lungs every 4 hours as needed for Shortness of Breath       montelukast (SINGULAIR) 10 MG tablet Take 10 mg by mouth nightly      ondansetron (ZOFRAN-ODT) 8 MG TBDP disintegrating tablet Take 8 mg by mouth every 8 hours as needed for Nausea or Vomiting       potassium chloride (KLOR-CON) 20 MEQ packet Take 20 mEq by mouth daily 90 packet 2     No current facility-administered medications for this visit. Social History     Socioeconomic History    Marital status:      Spouse name: Not on file    Number of children: 0    Years of education: 12    Highest education level: Bachelor's degree (e.g., BA, AB, BS)   Occupational History    Not on file   Tobacco Use    Smoking status: Never Smoker    Smokeless tobacco: Never Used   Vaping Use    Vaping Use: Never used   Substance and Sexual Activity    Alcohol use: No    Drug use: No    Sexual activity: Not Currently     Partners: Male   Other Topics Concern    Not on file   Social History Narrative    Patient is trying to exercise by walking her new puppy around the house. Plan is for puppy to become her support/seeing eye dog. [employer] will assist in training after puppy learns the basics. Patient does not visit with friends or relatives d/t COVID     Social Determinants of Health     Financial Resource Strain: Low Risk     Difficulty of Paying Living Expenses: Not hard at all   Food Insecurity: No Food Insecurity    Worried About Running Out of Food in the Last Year: Never true    [address] in the Last Year: Never true   Transportation Needs: No Transportation Needs    Lack of Transportation (Medical): No    Lack of Transportation (Non-Medical): No   Physical Activity: Inactive    Days of Exercise per Week: 0 days    Minutes of Exercise per Session: 0 min   Stress: Stress Concern Present    Feeling of Stress : To some extent   Social Connections: Moderately Integrated    Frequency of Communication with Friends and Family:  Three times a week    Frequency of Social Gatherings with Friends and Family: Twice a week    Attends Confucianism Services: 1 to 4 times per year    Active Member of [employer] or Organizations: No    Attends Club or Organization Meetings: Never    Marital Status:    Intimate Partner Violence: Not At Risk    Fear of Current or Ex-Partner: No    Emotionally Abused: No    Physically Abused: No    Sexually Abused: No       Family History   Problem Relation Age of Onset    Alzheimer's Disease Mother     Alzheimer's Disease Father     Heart Disease Father 59        MI  Stents    Stroke Father     Stroke Sister     Osteoporosis Sister     Hypertension Brother     High Cholesterol Brother     Alzheimer's Disease Paternal Aunt     Alzheimer's Disease Paternal Uncle        Review of Systems:  Heart: as above   Lungs: as above   Eyes: denies changes in vision or discharge. Ears: denies changes in hearing or pain. Nose: denies epistaxis or masses   Throat: denies sore throat or trouble swallowing. Neuro: denies numbness, tingling, tremors. Skin: denies rashes or itching. : denies hematuria, dysuria   GI: denies vomiting, diarrhea   Psych: denies mood changed, anxiety, depression. All other systems negative. Physical Exam   /68   Pulse 89   Resp 18   Ht 5' 2\" (1.575 m)   Wt 228 lb 1.6 oz (103.5 kg)   BMI 41.72 kg/m²   Constitutional: Oriented to person, place, and time. Well-developed and well-nourished. No distress. Head: Normocephalic and atraumatic. Eyes: EOM are normal. Pupils are equal, round, and reactive to light. Neck: Normal range of motion. Neck supple. No hepatojugular reflux and no JVD present. Carotid bruit is not present. No tracheal deviation present. No thyromegaly present. Cardiovascular: Normal rate, regular rhythm, normal heart sounds and intact distal pulses. Exam reveals no gallop and no friction rub. No murmur heard. Pulmonary/Chest: Effort normal and breath sounds normal. No respiratory distress.  No renal calculi    Gastroparesis    Gastroesophageal reflux disease    Essential hypertension    DDD (degenerative disc disease)    CVID (common variable immunodeficiency) (HCC)    Migraine    Irritable bowel syndrome    Constipation    Diarrhea    Benign intracranial hypertension    Depression    Anxiety    Anemia    Polycystic disease, ovaries    Cervicalgia    Asthma    Connective tissue disorder (HCC)    Stroke-like symptoms    TIA (transient ischemic attack)    Weakness of extremity    Subcutaneous nodule of abdominal wall    Flu vaccine need    Lower abdominal pain    Acute upper respiratory infection    Acute urinary tract infection    Optic neuritis    Immune deficiency disorder (HCC)    Wrist sprain, right, subsequent encounter    Viral pneumonia     1. YADAV/Abnormal EKG:     Multiple risk factors. Pharm stress low risk 1/27/2021.     2. POTS: Continue same. 3. DM: Per PCP. 4. PAULINE with CPAP    5. Lipids    6. Neurological symptoms/TIA. 7. Recent COVID infection. Paige Barone D.O.   Cardiologist  Cardiology, [facility]

## 2022-07-19 ENCOUNTER — HOSPITAL ENCOUNTER (EMERGENCY)
Age: 47
Discharge: HOME | End: 2022-07-19
Attending: EMERGENCY MEDICINE
Payer: MEDICAID

## 2022-07-19 ENCOUNTER — APPOINTMENT (OUTPATIENT)
Dept: CT IMAGING | Age: 47
End: 2022-07-19
Payer: MEDICAID

## 2022-07-19 VITALS
WEIGHT: 250 LBS | RESPIRATION RATE: 16 BRPM | TEMPERATURE: 97.6 F | HEIGHT: 62 IN | SYSTOLIC BLOOD PRESSURE: 148 MMHG | BODY MASS INDEX: 46.01 KG/M2 | OXYGEN SATURATION: 100 % | HEART RATE: 99 BPM | DIASTOLIC BLOOD PRESSURE: 93 MMHG

## 2022-07-19 DIAGNOSIS — R10.12 LEFT UPPER QUADRANT ABDOMINAL PAIN: Primary | ICD-10-CM

## 2022-07-19 DIAGNOSIS — R11.0 NAUSEA: ICD-10-CM

## 2022-07-19 LAB
ALBUMIN SERPL-MCNC: 4.5 G/DL (ref 3.5–5.2)
ALP BLD-CCNC: 149 U/L (ref 35–104)
ALT SERPL-CCNC: 34 U/L (ref 0–32)
ANION GAP SERPL CALCULATED.3IONS-SCNC: 12 MMOL/L (ref 7–16)
AST SERPL-CCNC: 28 U/L (ref 0–31)
BASOPHILS ABSOLUTE: 0.05 E9/L (ref 0–0.2)
BASOPHILS RELATIVE PERCENT: 0.8 % (ref 0–2)
BILIRUB SERPL-MCNC: 0.2 MG/DL (ref 0–1.2)
BILIRUBIN URINE: NEGATIVE
BLOOD, URINE: NEGATIVE
BUN BLDV-MCNC: 14 MG/DL (ref 6–20)
CALCIUM SERPL-MCNC: 9.7 MG/DL (ref 8.6–10.2)
CHLORIDE BLD-SCNC: 100 MMOL/L (ref 98–107)
CLARITY: CLEAR
CO2: 25 MMOL/L (ref 22–29)
COLOR: YELLOW
CREAT SERPL-MCNC: 0.9 MG/DL (ref 0.5–1)
EOSINOPHILS ABSOLUTE: 0.07 E9/L (ref 0.05–0.5)
EOSINOPHILS RELATIVE PERCENT: 1.1 % (ref 0–6)
GFR AFRICAN AMERICAN: >60
GFR NON-AFRICAN AMERICAN: >60 ML/MIN/1.73
GLUCOSE BLD-MCNC: 161 MG/DL (ref 74–99)
GLUCOSE URINE: NEGATIVE MG/DL
HCT VFR BLD CALC: 38.8 % (ref 34–48)
HEMOGLOBIN: 11.9 G/DL (ref 11.5–15.5)
IMMATURE GRANULOCYTES #: 0.02 E9/L
IMMATURE GRANULOCYTES %: 0.3 % (ref 0–5)
KETONES, URINE: NEGATIVE MG/DL
LACTIC ACID: 1.5 MMOL/L (ref 0.5–2.2)
LEUKOCYTE ESTERASE, URINE: NEGATIVE
LIPASE: 57 U/L (ref 13–60)
LYMPHOCYTES ABSOLUTE: 1.88 E9/L (ref 1.5–4)
LYMPHOCYTES RELATIVE PERCENT: 28.2 % (ref 20–42)
MCH RBC QN AUTO: 25.1 PG (ref 26–35)
MCHC RBC AUTO-ENTMCNC: 30.7 % (ref 32–34.5)
MCV RBC AUTO: 81.7 FL (ref 80–99.9)
METER GLUCOSE: 135 MG/DL (ref 74–99)
MONOCYTES ABSOLUTE: 0.48 E9/L (ref 0.1–0.95)
MONOCYTES RELATIVE PERCENT: 7.2 % (ref 2–12)
NEUTROPHILS ABSOLUTE: 4.16 E9/L (ref 1.8–7.3)
NEUTROPHILS RELATIVE PERCENT: 62.4 % (ref 43–80)
NITRITE, URINE: NEGATIVE
PDW BLD-RTO: 16.1 FL (ref 11.5–15)
PH UA: 7 (ref 5–9)
PLATELET # BLD: 364 E9/L (ref 130–450)
PMV BLD AUTO: 9.7 FL (ref 7–12)
POTASSIUM REFLEX MAGNESIUM: 4.6 MMOL/L (ref 3.5–5)
PROTEIN UA: NEGATIVE MG/DL
RBC # BLD: 4.75 E12/L (ref 3.5–5.5)
SODIUM BLD-SCNC: 137 MMOL/L (ref 132–146)
SPECIFIC GRAVITY UA: 1.01 (ref 1–1.03)
TOTAL PROTEIN: 7.9 G/DL (ref 6.4–8.3)
UROBILINOGEN, URINE: 0.2 E.U./DL
WBC # BLD: 6.7 E9/L (ref 4.5–11.5)

## 2022-07-19 PROCEDURE — 2580000003 HC RX 258

## 2022-07-19 PROCEDURE — 83690 ASSAY OF LIPASE: CPT

## 2022-07-19 PROCEDURE — 96374 THER/PROPH/DIAG INJ IV PUSH: CPT

## 2022-07-19 PROCEDURE — 74176 CT ABD & PELVIS W/O CONTRAST: CPT

## 2022-07-19 PROCEDURE — 99284 EMERGENCY DEPT VISIT MOD MDM: CPT

## 2022-07-19 PROCEDURE — 83605 ASSAY OF LACTIC ACID: CPT

## 2022-07-19 PROCEDURE — 85025 COMPLETE CBC W/AUTO DIFF WBC: CPT

## 2022-07-19 PROCEDURE — 80053 COMPREHEN METABOLIC PANEL: CPT

## 2022-07-19 PROCEDURE — 81003 URINALYSIS AUTO W/O SCOPE: CPT

## 2022-07-19 PROCEDURE — 96375 TX/PRO/DX INJ NEW DRUG ADDON: CPT

## 2022-07-19 PROCEDURE — 6360000002 HC RX W HCPCS

## 2022-07-19 PROCEDURE — 82962 GLUCOSE BLOOD TEST: CPT

## 2022-07-19 RX ORDER — FENTANYL CITRATE 50 UG/ML
25 INJECTION, SOLUTION INTRAMUSCULAR; INTRAVENOUS ONCE
Status: COMPLETED | OUTPATIENT
Start: 2022-07-19 | End: 2022-07-19

## 2022-07-19 RX ORDER — ONDANSETRON 2 MG/ML
4 INJECTION INTRAMUSCULAR; INTRAVENOUS ONCE
Status: COMPLETED | OUTPATIENT
Start: 2022-07-19 | End: 2022-07-19

## 2022-07-19 RX ORDER — ONDANSETRON 4 MG/1
4 TABLET, ORALLY DISINTEGRATING ORAL 3 TIMES DAILY PRN
Qty: 21 TABLET | Refills: 0 | Status: SHIPPED | OUTPATIENT
Start: 2022-07-19

## 2022-07-19 RX ORDER — 0.9 % SODIUM CHLORIDE 0.9 %
1000 INTRAVENOUS SOLUTION INTRAVENOUS ONCE
Status: COMPLETED | OUTPATIENT
Start: 2022-07-19 | End: 2022-07-19

## 2022-07-19 RX ADMIN — ONDANSETRON 4 MG: 2 INJECTION INTRAMUSCULAR; INTRAVENOUS at 18:24

## 2022-07-19 RX ADMIN — SODIUM CHLORIDE 1000 ML: 9 INJECTION, SOLUTION INTRAVENOUS at 18:16

## 2022-07-19 RX ADMIN — FENTANYL CITRATE 25 MCG: 50 INJECTION, SOLUTION INTRAMUSCULAR; INTRAVENOUS at 18:25

## 2022-07-19 ASSESSMENT — PAIN SCALES - GENERAL: PAINLEVEL_OUTOF10: 7

## 2022-07-19 NOTE — ED PROVIDER NOTES
ED Attending shared visit  CC: Nuria        [facility]  Department of Emergency Medicine   ED  Encounter Note  Admit Date/RoomTime: 2022  4:57 PM  ED Room: 36/36    NAME: Brenden Seals  : 1975  MRN: [id number]     Chief Complaint:  Abdominal Pain (upper) and Nausea    History of Present Illness       Brenden Seals is a 42 y.o. old female who presents to the emergency department by private vehicle, for gradual onset aching, sharp pain in the LUQ without radiation which began 1 day(s) prior to arrival.   There has been no similar episodes in the past .  Since onset the symptoms have been persistent. The pain is associated with nausea. The pain is aggravated by nothing in particular and relieved by nothing. There has been NO chest pain, shortness of breath, fever, chills, diarrhea, constipation, dark/black stools, blood in stool, blood in emesis, dysuria, hematuria, or urinary urgency. Last Menstrual  Cycle or OB history not available or N/A.  GYN history non-contributory or N/A. Patient stated that she called her GI doctor Dr. Berta Toney, and he told her to come to the emergency department due to just having an EGD and her esophagus was stretched, and she states that they took biopsies. Patient is a type II diabetic, and is on an insulin pump. The patient has a history of diverticulitis. Patient states that her blood sugars have been running in the high 100s to low 200s. Patient appears well, nontoxic and in no acute distress upon arrival.  No other complaints or concerns at this time. .  ROS   Pertinent positives and negatives are stated within HPI, all other systems reviewed and are negative.     Past Medical History:  has a past medical history of Abnormal Pap smear of cervix, Anemia, Anxiety, Asthma, Blind, Connective tissue disease ([address] Cyst of right breast, DDD (degenerative disc disease), cervical, Depression, Diabetes mellitus ([address] Dysphagia, Esophagus disorder, Fibromyalgia, Gastroparesis, GERD (gastroesophageal reflux disease), Headache, Hematuria, Hyperlipidemia, Hypertension, Hypothyroidism, IBS (irritable bowel syndrome), Immune deficiency disorder ([facility] Insomnia, Kidney stones, Meningitis, Meningitis, PAULINE on CPAP, PCOS (polycystic ovarian syndrome), POTS (postural orthostatic tachycardia syndrome), Problems with swallowing, Pseudotumor cerebri, Raynaud's disease, Rectal bleeding, RLS (restless legs syndrome), Scleroderma ([facility] Sjogren's disease ([facility] Thyroid nodule, and TIA (transient ischemic attack). Surgical History has a past surgical history that includes Hysterectomy; Cholecystectomy; Appendectomy; Carpal tunnel release (Bilateral); Port Surgery; and Pyloroplasty. Social History:  reports that she has never smoked. She has never used smokeless tobacco. She reports that she does not drink alcohol and does not use drugs. Family History: family history includes Alzheimer's Disease in her father, mother, paternal aunt, and paternal uncle; Heart Disease (age of onset: 59) in her father; High Cholesterol in her brother; Hypertension in her brother; Osteoporosis in her sister; Stroke in her father and sister.      Allergies: Cymbalta [duloxetine hcl], Diamox [acetazolamide], Doxepin, Duloxetine, Grapeseed extract [nutritional supplements], Levaquin [levofloxacin in d5w], Lipitor [atorvastatin], Other, Pecan nut (diagnostic), Prochlorperazine, Red dye, Reglan [metoclopramide], Rocephin [ceftriaxone], Rosemary oil, Strawberry flavor, Topamax [topiramate], Triptans, Hymera [macadamia nut oil], Abilify [aripiprazole], Aspartame and phenylalanine, Cefdinir, Darvon [propoxyphene], Dilaudid [hydromorphone hcl], Doxycycline, Effexor [venlafaxine], Gluten meal, Hydrochlorothiazide, Iron, Meclizine, Milk-related compounds, Sulfa antibiotics, Tetracyclines & related, Wheat bran, Zithromax [azithromycin], Barbiturates, Fenofibrate, Metformin, Strawberry extract, Sulfonylureas, Thiazide-type diuretics, Tobramycin, Vancomycin, Adhesive tape, and Betamethasone    Physical Exam   Oxygen Saturation Interpretation: Normal on room air analysis. ED Triage Vitals [07/19/22 1353]   BP Temp Temp Source Heart Rate Resp SpO2 Height Weight   (!) 172/113 97.6 °F (36.4 °C) Temporal (!) 106 18 100 % 5' 2\" (1.575 m) 250 lb (113.4 kg)        Physical Exam  General Appearance/Constitutional:  Alert, development consistent with age. HEENT:  NC/NT. PERRLA. Airway patent. Neck:  Supple. No lymphadenopathy. Respiratory:  No retractions. Lungs Clear to auscultation and breath sounds equal.  CV:  Regular rate and rhythm. GI:  normal appearing, non-distended with no visible hernias. Bowel sounds: normal bowel sounds. Tenderness: There is mild tenderness present - located in the RUQ., There is no rebound tenderness. , There is no guarding. , There is no distension. , There is no pulsatile mass. .        Liver: non-tender. Spleen:  non-palpable. Back: CVA Tenderness: No CVA tenderness. : /Pelvic examination deferred / declined. Integument:  Normal turgor. Warm, dry, without visible rash, unless noted elsewhere. Lymphatics: No edema, cap.refill <3sec. Neurological:  Orientation age-appropriate. Motor functions intact.     Lab / Imaging Results   (All laboratory and radiology results have been personally reviewed by myself)  Labs:  Results for orders placed or performed during the hospital encounter of 07/19/22   CBC with Auto Differential   Result Value Ref Range    WBC 6.7 4.5 - 11.5 E9/L    RBC 4.75 3.50 - 5.50 E12/L    Hemoglobin 11.9 11.5 - 15.5 g/dL    Hematocrit 38.8 34.0 - 48.0 %    MCV 81.7 80.0 - 99.9 fL    MCH 25.1 (L) 26.0 - 35.0 pg    MCHC 30.7 (L) 32.0 - 34.5 %    RDW 16.1 (H) 11.5 - 15.0 fL    Platelets 540 850 - 010 E9/L    MPV 9.7 7.0 - 12.0 fL    Neutrophils % 62.4 43.0 - 80.0 %    Immature Granulocytes % 0.3 0.0 - 5.0 %

## 2022-07-19 NOTE — ED NOTES
Department of Emergency Medicine    FIRST PROVIDER TRIAGE NOTE             Independent MLP           7/19/22  1:53 PM EDT    Date of Encounter: 7/19/22   MRN: [id number]    Vitals:    07/19/22 1353   BP: (!) 172/113   Pulse: (!) 106   Resp: 18   Temp: 97.6 °F (36.4 °C)   TempSrc: Temporal   SpO2: 100%   Weight: 250 lb (113.4 kg)   Height: 5' 2\" (1.575 m)      HPI: Garth Jones is a 42 y.o. female who presents to the ED for Abdominal Pain (upper) and Nausea  Recently told she had diverticulitis     ROS: Negative for cp, sob, vomiting, or diarrhea. Physical Exam:   Gen Appearance/Constitutional: alert  CV: mildly tachycardic   GI: tender to palpation- LUQ      Initial Plan of Care: All treatment areas with department are currently occupied. Plan to order/Initiate the following while awaiting opening in ED: labs.     Initial Plan of Care: Initiate Treatment-Testing, Proceed toTreatment Area When Bed Available for ED Attending/MLP to Continue Care    Electronically signed by Susie Francois PA-C   DD: 7/19/22       Susie Francois PA-C  07/19/22 [id number]

## 2023-02-23 LAB
ALANINE AMINOTRANSFERASE (SGPT) (U/L) IN SER/PLAS: 23 U/L (ref 7–45)
ALBUMIN (G/DL) IN SER/PLAS: 3.9 G/DL (ref 3.4–5)
ALKALINE PHOSPHATASE (U/L) IN SER/PLAS: 74 U/L (ref 33–110)
ANION GAP IN SER/PLAS: 10 MMOL/L (ref 10–20)
ASPARTATE AMINOTRANSFERASE (SGOT) (U/L) IN SER/PLAS: 16 U/L (ref 9–39)
BASOPHILS (10*3/UL) IN BLOOD BY AUTOMATED COUNT: 0.02 X10E9/L (ref 0–0.1)
BASOPHILS/100 LEUKOCYTES IN BLOOD BY AUTOMATED COUNT: 0.4 % (ref 0–2)
BILIRUBIN TOTAL (MG/DL) IN SER/PLAS: 0.3 MG/DL (ref 0–1.2)
CALCIDIOL (25 OH VITAMIN D3) (NG/ML) IN SER/PLAS: 26 NG/ML
CALCIUM (MG/DL) IN SER/PLAS: 8.8 MG/DL (ref 8.6–10.3)
CARBON DIOXIDE, TOTAL (MMOL/L) IN SER/PLAS: 26 MMOL/L (ref 21–32)
CHLORIDE (MMOL/L) IN SER/PLAS: 106 MMOL/L (ref 98–107)
CHOLESTEROL (MG/DL) IN SER/PLAS: NORMAL
CHOLESTEROL IN HDL (MG/DL) IN SER/PLAS: NORMAL
CHOLESTEROL/HDL RATIO: NORMAL
COBALAMIN (VITAMIN B12) (PG/ML) IN SER/PLAS: 299 PG/ML (ref 211–911)
CREATININE (MG/DL) IN SER/PLAS: 0.81 MG/DL (ref 0.5–1.05)
EOSINOPHILS (10*3/UL) IN BLOOD BY AUTOMATED COUNT: 0.03 X10E9/L (ref 0–0.7)
EOSINOPHILS/100 LEUKOCYTES IN BLOOD BY AUTOMATED COUNT: 0.7 % (ref 0–6)
ERYTHROCYTE DISTRIBUTION WIDTH (RATIO) BY AUTOMATED COUNT: 17.1 % (ref 11.5–14.5)
ERYTHROCYTE MEAN CORPUSCULAR HEMOGLOBIN CONCENTRATION (G/DL) BY AUTOMATED: 31.7 G/DL (ref 32–36)
ERYTHROCYTE MEAN CORPUSCULAR VOLUME (FL) BY AUTOMATED COUNT: 92 FL (ref 80–100)
ERYTHROCYTES (10*6/UL) IN BLOOD BY AUTOMATED COUNT: 4.29 X10E12/L (ref 4–5.2)
ESTIMATED AVERAGE GLUCOSE FOR HBA1C: 177 MG/DL
GFR FEMALE: >90 ML/MIN/1.73M2
GLUCOSE (MG/DL) IN SER/PLAS: 179 MG/DL (ref 74–99)
HEMATOCRIT (%) IN BLOOD BY AUTOMATED COUNT: 39.4 % (ref 36–46)
HEMOGLOBIN (G/DL) IN BLOOD: 12.5 G/DL (ref 12–16)
HEMOGLOBIN A1C/HEMOGLOBIN TOTAL IN BLOOD: 7.8 %
IMMATURE GRANULOCYTES/100 LEUKOCYTES IN BLOOD BY AUTOMATED COUNT: 0.4 % (ref 0–0.9)
IRON (UG/DL) IN SER/PLAS: 59 UG/DL (ref 35–150)
IRON BINDING CAPACITY (UG/DL) IN SER/PLAS: 334 UG/DL (ref 240–445)
IRON SATURATION (%) IN SER/PLAS: 18 % (ref 25–45)
LDL: NORMAL
LEUKOCYTES (10*3/UL) IN BLOOD BY AUTOMATED COUNT: 4.5 X10E9/L (ref 4.4–11.3)
LYMPHOCYTES (10*3/UL) IN BLOOD BY AUTOMATED COUNT: 1.62 X10E9/L (ref 1.2–4.8)
LYMPHOCYTES/100 LEUKOCYTES IN BLOOD BY AUTOMATED COUNT: 35.8 % (ref 13–44)
MAGNESIUM (MG/DL) IN SER/PLAS: 2.28 MG/DL (ref 1.6–2.4)
MONOCYTES (10*3/UL) IN BLOOD BY AUTOMATED COUNT: 0.41 X10E9/L (ref 0.1–1)
MONOCYTES/100 LEUKOCYTES IN BLOOD BY AUTOMATED COUNT: 9.1 % (ref 2–10)
NEUTROPHILS (10*3/UL) IN BLOOD BY AUTOMATED COUNT: 2.43 X10E9/L (ref 1.2–7.7)
NEUTROPHILS/100 LEUKOCYTES IN BLOOD BY AUTOMATED COUNT: 53.6 % (ref 40–80)
NON HDL CHOLESTEROL: NORMAL
PARATHYRIN INTACT (PG/ML) IN SER/PLAS: 75.1 PG/ML (ref 18.5–88)
PHOSPHATE (MG/DL) IN SER/PLAS: 2.8 MG/DL (ref 2.5–4.9)
PLATELETS (10*3/UL) IN BLOOD AUTOMATED COUNT: 322 X10E9/L (ref 150–450)
POC CALCIUM IONIZED (MMOL/L) IN BLOOD: 1.16 MMOL/L (ref 1.1–1.33)
POTASSIUM (MMOL/L) IN SER/PLAS: 3.8 MMOL/L (ref 3.5–5.3)
PROTEIN TOTAL: 6.5 G/DL (ref 6.4–8.2)
SODIUM (MMOL/L) IN SER/PLAS: 138 MMOL/L (ref 136–145)
THYROTROPIN (MIU/L) IN SER/PLAS BY DETECTION LIMIT <= 0.05 MIU/L: 0.94 MIU/L (ref 0.44–3.98)
THYROXINE (T4) FREE (NG/DL) IN SER/PLAS: 1.03 NG/DL (ref 0.61–1.12)
TRIGLYCERIDE (MG/DL) IN SER/PLAS: NORMAL
UREA NITROGEN (MG/DL) IN SER/PLAS: 13 MG/DL (ref 6–23)
VLDL: NORMAL

## 2023-03-21 ENCOUNTER — DOCUMENTATION (OUTPATIENT)
Dept: PRIMARY CARE | Facility: CLINIC | Age: 48
End: 2023-03-21
Payer: MEDICAID

## 2023-03-29 ENCOUNTER — APPOINTMENT (OUTPATIENT)
Dept: CT IMAGING | Age: 48
End: 2023-03-29
Payer: MEDICAID

## 2023-03-29 ENCOUNTER — HOSPITAL ENCOUNTER (EMERGENCY)
Age: 48
Discharge: HOME | End: 2023-03-29
Payer: MEDICAID

## 2023-03-29 VITALS
HEART RATE: 85 BPM | BODY MASS INDEX: 45.45 KG/M2 | DIASTOLIC BLOOD PRESSURE: 93 MMHG | HEIGHT: 62 IN | RESPIRATION RATE: 16 BRPM | WEIGHT: 247 LBS | OXYGEN SATURATION: 99 % | SYSTOLIC BLOOD PRESSURE: 146 MMHG | TEMPERATURE: 97 F

## 2023-03-29 DIAGNOSIS — R10.9 ABDOMINAL PAIN, UNSPECIFIED ABDOMINAL LOCATION: Primary | ICD-10-CM

## 2023-03-29 LAB
ALBUMIN SERPL-MCNC: 4.1 G/DL (ref 3.5–5.2)
ALP SERPL-CCNC: 76 U/L (ref 35–104)
ALT SERPL-CCNC: 26 U/L (ref 0–32)
ANION GAP SERPL CALCULATED.3IONS-SCNC: 11 MMOL/L (ref 7–16)
AST SERPL-CCNC: 24 U/L (ref 0–31)
BACTERIA URNS QL MICRO: ABNORMAL /HPF
BASOPHILS # BLD: 0.03 E9/L (ref 0–0.2)
BASOPHILS NFR BLD: 0.4 % (ref 0–2)
BILIRUB SERPL-MCNC: <0.2 MG/DL (ref 0–1.2)
BILIRUB UR QL STRIP: NEGATIVE
BUN SERPL-MCNC: 11 MG/DL (ref 6–20)
CALCIUM SERPL-MCNC: 9.4 MG/DL (ref 8.6–10.2)
CHLORIDE SERPL-SCNC: 101 MMOL/L (ref 98–107)
CLARITY UR: CLEAR
CO2 SERPL-SCNC: 27 MMOL/L (ref 22–29)
COLOR UR: YELLOW
CREAT SERPL-MCNC: 0.8 MG/DL (ref 0.5–1)
EOSINOPHIL # BLD: 0.02 E9/L (ref 0.05–0.5)
EOSINOPHIL NFR BLD: 0.3 % (ref 0–6)
EPI CELLS #/AREA URNS HPF: ABNORMAL /HPF
ERYTHROCYTE [DISTWIDTH] IN BLOOD BY AUTOMATED COUNT: 14.1 FL (ref 11.5–15)
GLUCOSE SERPL-MCNC: 111 MG/DL (ref 74–99)
GLUCOSE UR STRIP-MCNC: 500 MG/DL
HCT VFR BLD AUTO: 45.4 % (ref 34–48)
HGB BLD-MCNC: 14.4 G/DL (ref 11.5–15.5)
HGB UR QL STRIP: NEGATIVE
IMM GRANULOCYTES # BLD: 0.03 E9/L
IMM GRANULOCYTES NFR BLD: 0.4 % (ref 0–5)
KETONES UR STRIP-MCNC: NEGATIVE MG/DL
LACTATE BLDV-SCNC: 1.7 MMOL/L (ref 0.5–2.2)
LEUKOCYTE ESTERASE UR QL STRIP: NEGATIVE
LYMPHOCYTES # BLD: 1.91 E9/L (ref 1.5–4)
LYMPHOCYTES NFR BLD: 24.2 % (ref 20–42)
MCH RBC QN AUTO: 29.1 PG (ref 26–35)
MCHC RBC AUTO-ENTMCNC: 31.7 % (ref 32–34.5)
MCV RBC AUTO: 91.7 FL (ref 80–99.9)
MONOCYTES # BLD: 0.32 E9/L (ref 0.1–0.95)
MONOCYTES NFR BLD: 4.1 % (ref 2–12)
NEUTROPHILS # BLD: 5.58 E9/L (ref 1.8–7.3)
NEUTS SEG NFR BLD: 70.6 % (ref 43–80)
NITRITE UR QL STRIP: NEGATIVE
PH UR STRIP: 7.5 [PH] (ref 5–9)
PLATELET # BLD AUTO: 287 E9/L (ref 130–450)
PMV BLD AUTO: 10 FL (ref 7–12)
POTASSIUM SERPL-SCNC: 4.1 MMOL/L (ref 3.5–5)
PROT SERPL-MCNC: 7.8 G/DL (ref 6.4–8.3)
PROT UR STRIP-MCNC: NEGATIVE MG/DL
RBC # BLD AUTO: 4.95 E12/L (ref 3.5–5.5)
RBC #/AREA URNS HPF: ABNORMAL /HPF (ref 0–2)
SODIUM SERPL-SCNC: 139 MMOL/L (ref 132–146)
SP GR UR STRIP: 1.01 (ref 1–1.03)
UROBILINOGEN UR STRIP-ACNC: 0.2 E.U./DL
WBC # BLD: 7.9 E9/L (ref 4.5–11.5)
WBC #/AREA URNS HPF: ABNORMAL /HPF (ref 0–5)

## 2023-03-29 PROCEDURE — 6360000002 HC RX W HCPCS: Performed by: NURSE PRACTITIONER

## 2023-03-29 PROCEDURE — 74177 CT ABD & PELVIS W/CONTRAST: CPT

## 2023-03-29 PROCEDURE — 99285 EMERGENCY DEPT VISIT HI MDM: CPT

## 2023-03-29 PROCEDURE — 6360000004 HC RX CONTRAST MEDICATION: Performed by: RADIOLOGY

## 2023-03-29 PROCEDURE — 96375 TX/PRO/DX INJ NEW DRUG ADDON: CPT

## 2023-03-29 PROCEDURE — 2580000003 HC RX 258: Performed by: NURSE PRACTITIONER

## 2023-03-29 PROCEDURE — 80053 COMPREHEN METABOLIC PANEL: CPT

## 2023-03-29 PROCEDURE — 85025 COMPLETE CBC W/AUTO DIFF WBC: CPT

## 2023-03-29 PROCEDURE — 96374 THER/PROPH/DIAG INJ IV PUSH: CPT

## 2023-03-29 PROCEDURE — 83605 ASSAY OF LACTIC ACID: CPT

## 2023-03-29 PROCEDURE — 81001 URINALYSIS AUTO W/SCOPE: CPT

## 2023-03-29 RX ORDER — ONDANSETRON 4 MG/1
4 TABLET, ORALLY DISINTEGRATING ORAL 3 TIMES DAILY PRN
Qty: 12 TABLET | Refills: 0 | Status: SHIPPED | OUTPATIENT
Start: 2023-03-29

## 2023-03-29 RX ORDER — ONDANSETRON 4 MG/1
4 TABLET, ORALLY DISINTEGRATING ORAL 3 TIMES DAILY PRN
Qty: 12 TABLET | Refills: 0 | Status: SHIPPED | OUTPATIENT
Start: 2023-03-29 | End: 2023-03-29 | Stop reason: SDUPTHER

## 2023-03-29 RX ORDER — ONDANSETRON 2 MG/ML
4 INJECTION INTRAMUSCULAR; INTRAVENOUS EVERY 6 HOURS PRN
Status: DISCONTINUED | OUTPATIENT
Start: 2023-03-29 | End: 2023-03-30 | Stop reason: HOSPADM

## 2023-03-29 RX ORDER — DICYCLOMINE HCL 20 MG
20 TABLET ORAL 4 TIMES DAILY
Qty: 12 TABLET | Refills: 0 | Status: SHIPPED | OUTPATIENT
Start: 2023-03-29

## 2023-03-29 RX ORDER — KETOROLAC TROMETHAMINE 30 MG/ML
15 INJECTION, SOLUTION INTRAMUSCULAR; INTRAVENOUS ONCE
Status: COMPLETED | OUTPATIENT
Start: 2023-03-29 | End: 2023-03-29

## 2023-03-29 RX ORDER — 0.9 % SODIUM CHLORIDE 0.9 %
1000 INTRAVENOUS SOLUTION INTRAVENOUS ONCE
Status: COMPLETED | OUTPATIENT
Start: 2023-03-29 | End: 2023-03-29

## 2023-03-29 RX ORDER — DICYCLOMINE HCL 20 MG
20 TABLET ORAL 4 TIMES DAILY
Qty: 12 TABLET | Refills: 0 | Status: SHIPPED | OUTPATIENT
Start: 2023-03-29 | End: 2023-03-29 | Stop reason: SDUPTHER

## 2023-03-29 RX ADMIN — KETOROLAC TROMETHAMINE 15 MG: 30 INJECTION, SOLUTION INTRAMUSCULAR; INTRAVENOUS at 19:37

## 2023-03-29 RX ADMIN — SODIUM CHLORIDE 1000 ML: 9 INJECTION, SOLUTION INTRAVENOUS at 19:42

## 2023-03-29 RX ADMIN — ONDANSETRON HYDROCHLORIDE 4 MG: 2 SOLUTION INTRAMUSCULAR; INTRAVENOUS at 19:37

## 2023-03-29 RX ADMIN — IOPAMIDOL 75 ML: 755 INJECTION, SOLUTION INTRAVENOUS at 20:52

## 2023-03-29 ASSESSMENT — PAIN DESCRIPTION - ORIENTATION: ORIENTATION: RIGHT

## 2023-03-29 ASSESSMENT — LIFESTYLE VARIABLES
HOW MANY STANDARD DRINKS CONTAINING ALCOHOL DO YOU HAVE ON A TYPICAL DAY: PATIENT DOES NOT DRINK
HOW OFTEN DO YOU HAVE A DRINK CONTAINING ALCOHOL: NEVER

## 2023-03-29 ASSESSMENT — PAIN DESCRIPTION - LOCATION: LOCATION: ABDOMEN

## 2023-03-29 ASSESSMENT — PAIN DESCRIPTION - DESCRIPTORS: DESCRIPTORS: ACHING;CRAMPING;DISCOMFORT

## 2023-03-30 ENCOUNTER — TELEPHONE (OUTPATIENT)
Dept: PRIMARY CARE | Facility: CLINIC | Age: 48
End: 2023-03-30
Payer: MEDICAID

## 2023-03-30 NOTE — ED PROVIDER NOTES
times per week. Qty: 90 tablet, Refills: 3    Associated Diagnoses: Essential hypertension      Cinnamon 500 MG CAPS Take 600 mg by mouth 2 times daily      !! NONFORMULARY 300 mg 3 times daily Carlos - Holy Basal      cimetidine (TAGAMET) 300 MG tablet Take 300 mg by mouth 2 times daily       levothyroxine (SYNTHROID) 50 MCG tablet Take 2.5 tablets by mouth Five times weekly Given Monday,Tuesday,Wednesday,Thursday,Friday  Qty: 90 tablet, Refills: 1    Associated Diagnoses: Acquired hypothyroidism      Immune Globulin, Human, (GAMMAGARD IV) Infuse intravenously 45 gm per port once a month      propranolol (INDERAL) 10 MG tablet TAKE 1 TABLET BY MOUTH THREE TIMES DAILY  Qty: 90 tablet, Refills: 3    Associated Diagnoses: Essential hypertension      ascorbic acid (VITAMIN C) 500 MG tablet TAKE 1 TABLET BY MOUTH EVERY DAY      divalproex (DEPAKOTE ER) 500 MG extended release tablet TAKE 1 TABLET BY MOUTH THREE TIMES DAILY      zinc 50 MG CAPS Take 50 mg by mouth daily  Qty: 30 capsule, Refills: 0      polyethylene glycol (GLYCOLAX) 17 g packet Take 17 g by mouth 2 times daily as needed for Constipation  Qty: 527 g, Refills: 2      Polyvinyl Alcohol-Povidone (REFRESH OP) Apply to eye Artificial tears      insulin glargine (LANTUS SOLOSTAR) 100 UNIT/ML injection pen Injects 14 units in the morning and 22 units at night      Magnesium 500 MG TABS Take 1 tablet by mouth 2 times daily      insulin lispro, 1 Unit Dial, (HUMALOG KWIKPEN) 100 UNIT/ML SOPN Inject into the skin daily Sliding scale: 150-200- 4 units, then add 2 units for every 50 up to a max of 12 units.       Probiotic Product (PROBIOTIC DAILY PO) daily       aspirin 81 MG EC tablet Take 81 mg by mouth daily       rOPINIRole (REQUIP) 0.5 MG tablet Take 1 tablet by mouth See Admin Instructions 1 tablet - breakfast  2 tablets - supper  Qty: 270 tablet, Refills: 1    Associated Diagnoses: Restless legs      pantoprazole (PROTONIX) 40 MG tablet Take 1 tablet by mouth

## 2023-04-01 LAB
THYROTROPIN (MIU/L) IN SER/PLAS BY DETECTION LIMIT <= 0.05 MIU/L: 0.4 MIU/L (ref 0.44–3.98)
THYROXINE (T4) FREE (NG/DL) IN SER/PLAS: 1.24 NG/DL (ref 0.78–1.48)

## 2023-04-10 ENCOUNTER — HOSPITAL ENCOUNTER (EMERGENCY)
Age: 48
Discharge: HOME | End: 2023-04-11
Attending: STUDENT IN AN ORGANIZED HEALTH CARE EDUCATION/TRAINING PROGRAM
Payer: MEDICAID

## 2023-04-10 DIAGNOSIS — R11.2 NAUSEA AND VOMITING, UNSPECIFIED VOMITING TYPE: Primary | ICD-10-CM

## 2023-04-10 DIAGNOSIS — R19.7 DIARRHEA, UNSPECIFIED TYPE: ICD-10-CM

## 2023-04-10 PROCEDURE — 96375 TX/PRO/DX INJ NEW DRUG ADDON: CPT

## 2023-04-10 PROCEDURE — 96372 THER/PROPH/DIAG INJ SC/IM: CPT

## 2023-04-10 PROCEDURE — 99285 EMERGENCY DEPT VISIT HI MDM: CPT

## 2023-04-10 PROCEDURE — 96374 THER/PROPH/DIAG INJ IV PUSH: CPT

## 2023-04-10 RX ORDER — ONDANSETRON 2 MG/ML
4 INJECTION INTRAMUSCULAR; INTRAVENOUS ONCE
Status: COMPLETED | OUTPATIENT
Start: 2023-04-10 | End: 2023-04-11

## 2023-04-10 RX ORDER — 0.9 % SODIUM CHLORIDE 0.9 %
1000 INTRAVENOUS SOLUTION INTRAVENOUS ONCE
Status: COMPLETED | OUTPATIENT
Start: 2023-04-10 | End: 2023-04-11

## 2023-04-11 ENCOUNTER — APPOINTMENT (OUTPATIENT)
Dept: CT IMAGING | Age: 48
End: 2023-04-11
Payer: MEDICAID

## 2023-04-11 VITALS
HEIGHT: 62 IN | WEIGHT: 250 LBS | HEART RATE: 95 BPM | DIASTOLIC BLOOD PRESSURE: 97 MMHG | TEMPERATURE: 97.7 F | SYSTOLIC BLOOD PRESSURE: 155 MMHG | RESPIRATION RATE: 16 BRPM | OXYGEN SATURATION: 97 % | BODY MASS INDEX: 46.01 KG/M2

## 2023-04-11 LAB
ALBUMIN SERPL-MCNC: 4.3 G/DL (ref 3.5–5.2)
ALP SERPL-CCNC: 120 U/L (ref 35–104)
ALT SERPL-CCNC: 118 U/L (ref 0–32)
ANION GAP SERPL CALCULATED.3IONS-SCNC: 12 MMOL/L (ref 7–16)
AST SERPL-CCNC: 76 U/L (ref 0–31)
BASOPHILS # BLD: 0 E9/L (ref 0–0.2)
BASOPHILS NFR BLD: 0 % (ref 0–2)
BILIRUB SERPL-MCNC: 0.2 MG/DL (ref 0–1.2)
BILIRUB UR QL STRIP: NEGATIVE
BUN SERPL-MCNC: 14 MG/DL (ref 6–20)
CALCIUM SERPL-MCNC: 9.8 MG/DL (ref 8.6–10.2)
CHLORIDE SERPL-SCNC: 101 MMOL/L (ref 98–107)
CLARITY UR: CLEAR
CO2 SERPL-SCNC: 27 MMOL/L (ref 22–29)
COLOR UR: YELLOW
CREAT SERPL-MCNC: 0.8 MG/DL (ref 0.5–1)
EOSINOPHIL # BLD: 0.06 E9/L (ref 0.05–0.5)
EOSINOPHIL NFR BLD: 1.6 % (ref 0–6)
ERYTHROCYTE [DISTWIDTH] IN BLOOD BY AUTOMATED COUNT: 14.3 FL (ref 11.5–15)
GLUCOSE SERPL-MCNC: 135 MG/DL (ref 74–99)
GLUCOSE UR STRIP-MCNC: >=1000 MG/DL
HCG UR QL: NEGATIVE
HCT VFR BLD AUTO: 43.8 % (ref 34–48)
HGB BLD-MCNC: 13.9 G/DL (ref 11.5–15.5)
HGB UR QL STRIP: NEGATIVE
IMM GRANULOCYTES # BLD: 0.02 E9/L
IMM GRANULOCYTES NFR BLD: 0.5 % (ref 0–5)
KETONES UR STRIP-MCNC: 15 MG/DL
LACTATE BLDV-SCNC: 0.9 MMOL/L (ref 0.5–2.2)
LEUKOCYTE ESTERASE UR QL STRIP: NEGATIVE
LIPASE: 28 U/L (ref 13–60)
LYMPHOCYTES # BLD: 1.47 E9/L (ref 1.5–4)
LYMPHOCYTES NFR BLD: 40.2 % (ref 20–42)
MCH RBC QN AUTO: 29 PG (ref 26–35)
MCHC RBC AUTO-ENTMCNC: 31.7 % (ref 32–34.5)
MCV RBC AUTO: 91.3 FL (ref 80–99.9)
MONOCYTES # BLD: 0.28 E9/L (ref 0.1–0.95)
MONOCYTES NFR BLD: 7.7 % (ref 2–12)
NEUTROPHILS # BLD: 1.83 E9/L (ref 1.8–7.3)
NEUTS SEG NFR BLD: 50 % (ref 43–80)
NITRITE UR QL STRIP: NEGATIVE
PH UR STRIP: 7 [PH] (ref 5–9)
PLATELET # BLD AUTO: 237 E9/L (ref 130–450)
PMV BLD AUTO: 10.2 FL (ref 7–12)
POTASSIUM SERPL-SCNC: 4.3 MMOL/L (ref 3.5–5)
PROT SERPL-MCNC: 7.4 G/DL (ref 6.4–8.3)
PROT UR STRIP-MCNC: NEGATIVE MG/DL
RBC # BLD AUTO: 4.8 E12/L (ref 3.5–5.5)
RBC MORPH BLD: NORMAL
SODIUM SERPL-SCNC: 140 MMOL/L (ref 132–146)
SP GR UR STRIP: 1.01 (ref 1–1.03)
UROBILINOGEN UR STRIP-ACNC: 0.2 E.U./DL
WBC # BLD: 3.7 E9/L (ref 4.5–11.5)

## 2023-04-11 PROCEDURE — 83690 ASSAY OF LIPASE: CPT

## 2023-04-11 PROCEDURE — 83605 ASSAY OF LACTIC ACID: CPT

## 2023-04-11 PROCEDURE — 2580000003 HC RX 258: Performed by: STUDENT IN AN ORGANIZED HEALTH CARE EDUCATION/TRAINING PROGRAM

## 2023-04-11 PROCEDURE — 96374 THER/PROPH/DIAG INJ IV PUSH: CPT

## 2023-04-11 PROCEDURE — 6360000004 HC RX CONTRAST MEDICATION: Performed by: RADIOLOGY

## 2023-04-11 PROCEDURE — 81003 URINALYSIS AUTO W/O SCOPE: CPT

## 2023-04-11 PROCEDURE — 96375 TX/PRO/DX INJ NEW DRUG ADDON: CPT

## 2023-04-11 PROCEDURE — 80053 COMPREHEN METABOLIC PANEL: CPT

## 2023-04-11 PROCEDURE — 96372 THER/PROPH/DIAG INJ SC/IM: CPT

## 2023-04-11 PROCEDURE — 36415 COLL VENOUS BLD VENIPUNCTURE: CPT

## 2023-04-11 PROCEDURE — 85025 COMPLETE CBC W/AUTO DIFF WBC: CPT

## 2023-04-11 PROCEDURE — 74177 CT ABD & PELVIS W/CONTRAST: CPT

## 2023-04-11 PROCEDURE — 81025 URINE PREGNANCY TEST: CPT

## 2023-04-11 PROCEDURE — 6360000002 HC RX W HCPCS: Performed by: STUDENT IN AN ORGANIZED HEALTH CARE EDUCATION/TRAINING PROGRAM

## 2023-04-11 RX ORDER — ONDANSETRON 4 MG/1
4 TABLET, FILM COATED ORAL EVERY 8 HOURS PRN
Qty: 12 TABLET | Refills: 0 | Status: SHIPPED | OUTPATIENT
Start: 2023-04-11 | End: 2023-04-12

## 2023-04-11 RX ORDER — FENTANYL CITRATE 50 UG/ML
25 INJECTION, SOLUTION INTRAMUSCULAR; INTRAVENOUS ONCE
Status: COMPLETED | OUTPATIENT
Start: 2023-04-11 | End: 2023-04-11

## 2023-04-11 RX ORDER — 0.9 % SODIUM CHLORIDE 0.9 %
1000 INTRAVENOUS SOLUTION INTRAVENOUS ONCE
Status: COMPLETED | OUTPATIENT
Start: 2023-04-11 | End: 2023-04-11

## 2023-04-11 RX ADMIN — TRIMETHOBENZAMIDE HYDROCHLORIDE 200 MG: 100 INJECTION INTRAMUSCULAR at 02:16

## 2023-04-11 RX ADMIN — IOPAMIDOL 50 ML: 755 INJECTION, SOLUTION INTRAVENOUS at 02:06

## 2023-04-11 RX ADMIN — SODIUM CHLORIDE 1000 ML: 9 INJECTION, SOLUTION INTRAVENOUS at 00:29

## 2023-04-11 RX ADMIN — FENTANYL CITRATE 25 MCG: 50 INJECTION, SOLUTION INTRAMUSCULAR; INTRAVENOUS at 02:16

## 2023-04-11 RX ADMIN — ONDANSETRON 4 MG: 2 INJECTION INTRAMUSCULAR; INTRAVENOUS at 00:30

## 2023-04-11 RX ADMIN — SODIUM CHLORIDE 1000 ML: 9 INJECTION, SOLUTION INTRAVENOUS at 02:17

## 2023-04-11 ASSESSMENT — PAIN DESCRIPTION - LOCATION
LOCATION: FLANK
LOCATION: FLANK

## 2023-04-11 ASSESSMENT — ENCOUNTER SYMPTOMS
BLOOD IN STOOL: 0
SINUS PRESSURE: 0
EYE DISCHARGE: 0
ABDOMINAL DISTENTION: 0
WHEEZING: 0
SORE THROAT: 0
VOMITING: 1
EYE PAIN: 0
DIARRHEA: 1
SHORTNESS OF BREATH: 0
NAUSEA: 1
EYE REDNESS: 0
COUGH: 0
BACK PAIN: 0
ABDOMINAL PAIN: 1

## 2023-04-11 ASSESSMENT — PAIN DESCRIPTION - ORIENTATION
ORIENTATION: RIGHT
ORIENTATION: RIGHT

## 2023-04-11 ASSESSMENT — PAIN - FUNCTIONAL ASSESSMENT: PAIN_FUNCTIONAL_ASSESSMENT: 0-10

## 2023-04-11 ASSESSMENT — PAIN SCALES - GENERAL
PAINLEVEL_OUTOF10: 6
PAINLEVEL_OUTOF10: 6

## 2023-04-11 NOTE — DISCHARGE INSTRUCTIONS
Follow up with your gastroenterologist  If symptoms worsen or concern arises return for re-evaluation.

## 2023-04-11 NOTE — ED PROVIDER NOTES
Urine 15 (A) Negative mg/dL    Specific Gravity, UA 1.010 1.005 - 1.030    Blood, Urine Negative Negative    pH, UA 7.0 5.0 - 9.0    Protein, UA Negative Negative mg/dL    Urobilinogen, Urine 0.2 <2.0 E.U./dL    Nitrite, Urine Negative Negative    Leukocyte Esterase, Urine Negative Negative   Urine Preg (Lab)   Result Value Ref Range    HCG(Urine) Pregnancy Test NEGATIVE NEGATIVE       Radiology:  CT ABDOMEN PELVIS W IV CONTRAST Additional Contrast? None   Final Result   No acute abdominopelvic abnormality. Fluid throughout the colon, which can   be seen in the setting a diarrheal illness. RECOMMENDATIONS:   Unavailable             ------------------------- NURSING NOTES AND VITALS REVIEWED ---------------------------  Date / Time Roomed:  4/10/2023 11:12 PM  ED Bed Assignment:  06/06    The nursing notes within the ED encounter and vital signs as below have been reviewed. BP (!) 155/97   Pulse 95   Temp 97.7 °F (36.5 °C) (Oral)   Resp 16   Ht 5' 2\" (1.575 m)   Wt 250 lb (113.4 kg)   SpO2 97%   BMI 45.73 kg/m²   Oxygen Saturation Interpretation: Normal      ------------------------------------------ PROGRESS NOTES ------------------------------------------  9:38 PM EDT  I have spoken with the patient and discussed todays results, in addition to providing specific details for the plan of care and counseling regarding the diagnosis and prognosis. Their questions are answered at this time and they are agreeable with the plan. I discussed at length with them reasons for immediate return here for re evaluation. They will followup with their primary care physician by calling their office tomorrow. --------------------------------- ADDITIONAL PROVIDER NOTES ---------------------------------  At this time the patient is without objective evidence of an acute process requiring hospitalization or inpatient management.   They have remained hemodynamically stable throughout their entire ED visit and are

## 2023-04-12 ENCOUNTER — HOSPITAL ENCOUNTER (EMERGENCY)
Age: 48
Discharge: HOME | End: 2023-04-13
Attending: STUDENT IN AN ORGANIZED HEALTH CARE EDUCATION/TRAINING PROGRAM
Payer: MEDICAID

## 2023-04-12 DIAGNOSIS — R10.11 RIGHT UPPER QUADRANT ABDOMINAL PAIN: Primary | ICD-10-CM

## 2023-04-12 LAB
ALBUMIN SERPL-MCNC: 4.1 G/DL (ref 3.5–5.2)
ALP SERPL-CCNC: 98 U/L (ref 35–104)
ALT SERPL-CCNC: 63 U/L (ref 0–32)
ANION GAP SERPL CALCULATED.3IONS-SCNC: 13 MMOL/L (ref 7–16)
AST SERPL-CCNC: 33 U/L (ref 0–31)
BACTERIA URNS QL MICRO: ABNORMAL /HPF
BASOPHILS # BLD: 0.01 E9/L (ref 0–0.2)
BASOPHILS NFR BLD: 0.2 % (ref 0–2)
BETA-HYDROXYBUTYRATE: 0.33 MMOL/L (ref 0.02–0.27)
BILIRUB SERPL-MCNC: 0.2 MG/DL (ref 0–1.2)
BILIRUB UR QL STRIP: NEGATIVE
BUN SERPL-MCNC: 8 MG/DL (ref 6–20)
CALCIUM SERPL-MCNC: 9.7 MG/DL (ref 8.6–10.2)
CHLORIDE SERPL-SCNC: 103 MMOL/L (ref 98–107)
CLARITY UR: CLEAR
CO2 SERPL-SCNC: 25 MMOL/L (ref 22–29)
COLOR UR: YELLOW
CREAT SERPL-MCNC: 0.8 MG/DL (ref 0.5–1)
EOSINOPHIL # BLD: 0.09 E9/L (ref 0.05–0.5)
EOSINOPHIL NFR BLD: 2.2 % (ref 0–6)
EPI CELLS #/AREA URNS HPF: ABNORMAL /HPF
ERYTHROCYTE [DISTWIDTH] IN BLOOD BY AUTOMATED COUNT: 13.9 FL (ref 11.5–15)
GLUCOSE SERPL-MCNC: 108 MG/DL (ref 74–99)
GLUCOSE UR STRIP-MCNC: >=1000 MG/DL
HCT VFR BLD AUTO: 42.2 % (ref 34–48)
HGB BLD-MCNC: 13.5 G/DL (ref 11.5–15.5)
HGB UR QL STRIP: NEGATIVE
IMM GRANULOCYTES # BLD: 0.02 E9/L
IMM GRANULOCYTES NFR BLD: 0.5 % (ref 0–5)
KETONES UR STRIP-MCNC: NEGATIVE MG/DL
LACTATE BLDV-SCNC: 1.1 MMOL/L (ref 0.5–2.2)
LEUKOCYTE ESTERASE UR QL STRIP: NEGATIVE
LIPASE: 23 U/L (ref 13–60)
LYMPHOCYTES # BLD: 1.42 E9/L (ref 1.5–4)
LYMPHOCYTES NFR BLD: 34 % (ref 20–42)
MCH RBC QN AUTO: 28.9 PG (ref 26–35)
MCHC RBC AUTO-ENTMCNC: 32 % (ref 32–34.5)
MCV RBC AUTO: 90.4 FL (ref 80–99.9)
MONOCYTES # BLD: 0.32 E9/L (ref 0.1–0.95)
MONOCYTES NFR BLD: 7.7 % (ref 2–12)
NEUTROPHILS # BLD: 2.32 E9/L (ref 1.8–7.3)
NEUTS SEG NFR BLD: 55.4 % (ref 43–80)
NITRITE UR QL STRIP: NEGATIVE
PH UR STRIP: 5.5 [PH] (ref 5–9)
PH VENOUS: 7.42 (ref 7.35–7.45)
PLATELET # BLD AUTO: 260 E9/L (ref 130–450)
PMV BLD AUTO: 9.8 FL (ref 7–12)
POTASSIUM SERPL-SCNC: 4.1 MMOL/L (ref 3.5–5)
PROT SERPL-MCNC: 7 G/DL (ref 6.4–8.3)
PROT UR STRIP-MCNC: NEGATIVE MG/DL
RBC # BLD AUTO: 4.67 E12/L (ref 3.5–5.5)
RBC #/AREA URNS HPF: ABNORMAL /HPF (ref 0–2)
SODIUM SERPL-SCNC: 141 MMOL/L (ref 132–146)
SP GR UR STRIP: <=1.005 (ref 1–1.03)
UROBILINOGEN UR STRIP-ACNC: 0.2 E.U./DL
WBC # BLD: 4.2 E9/L (ref 4.5–11.5)
WBC #/AREA URNS HPF: ABNORMAL /HPF (ref 0–5)

## 2023-04-12 PROCEDURE — 83690 ASSAY OF LIPASE: CPT

## 2023-04-12 PROCEDURE — 82010 KETONE BODYS QUAN: CPT

## 2023-04-12 PROCEDURE — 82800 BLOOD PH: CPT

## 2023-04-12 PROCEDURE — 36415 COLL VENOUS BLD VENIPUNCTURE: CPT

## 2023-04-12 PROCEDURE — 80053 COMPREHEN METABOLIC PANEL: CPT

## 2023-04-12 PROCEDURE — 85025 COMPLETE CBC W/AUTO DIFF WBC: CPT

## 2023-04-12 PROCEDURE — 6360000002 HC RX W HCPCS: Performed by: STUDENT IN AN ORGANIZED HEALTH CARE EDUCATION/TRAINING PROGRAM

## 2023-04-12 PROCEDURE — 96374 THER/PROPH/DIAG INJ IV PUSH: CPT

## 2023-04-12 PROCEDURE — 96375 TX/PRO/DX INJ NEW DRUG ADDON: CPT

## 2023-04-12 PROCEDURE — 2580000003 HC RX 258: Performed by: STUDENT IN AN ORGANIZED HEALTH CARE EDUCATION/TRAINING PROGRAM

## 2023-04-12 PROCEDURE — 81001 URINALYSIS AUTO W/SCOPE: CPT

## 2023-04-12 PROCEDURE — 99284 EMERGENCY DEPT VISIT MOD MDM: CPT

## 2023-04-12 PROCEDURE — 83605 ASSAY OF LACTIC ACID: CPT

## 2023-04-12 RX ORDER — 0.9 % SODIUM CHLORIDE 0.9 %
1000 INTRAVENOUS SOLUTION INTRAVENOUS ONCE
Status: COMPLETED | OUTPATIENT
Start: 2023-04-12 | End: 2023-04-13

## 2023-04-12 RX ORDER — PROCHLORPERAZINE EDISYLATE 5 MG/ML
10 INJECTION INTRAMUSCULAR; INTRAVENOUS ONCE
Status: COMPLETED | OUTPATIENT
Start: 2023-04-12 | End: 2023-04-12

## 2023-04-12 RX ORDER — FENTANYL CITRATE 50 UG/ML
50 INJECTION, SOLUTION INTRAMUSCULAR; INTRAVENOUS ONCE
Status: COMPLETED | OUTPATIENT
Start: 2023-04-12 | End: 2023-04-12

## 2023-04-12 RX ORDER — DIPHENHYDRAMINE HYDROCHLORIDE 50 MG/ML
25 INJECTION INTRAMUSCULAR; INTRAVENOUS ONCE
Status: COMPLETED | OUTPATIENT
Start: 2023-04-12 | End: 2023-04-12

## 2023-04-12 RX ADMIN — SODIUM CHLORIDE 1000 ML: 9 INJECTION, SOLUTION INTRAVENOUS at 21:39

## 2023-04-12 RX ADMIN — PROCHLORPERAZINE EDISYLATE 10 MG: 5 INJECTION INTRAMUSCULAR; INTRAVENOUS at 21:40

## 2023-04-12 RX ADMIN — FENTANYL CITRATE 50 MCG: 50 INJECTION, SOLUTION INTRAMUSCULAR; INTRAVENOUS at 21:40

## 2023-04-12 RX ADMIN — DIPHENHYDRAMINE HYDROCHLORIDE 25 MG: 50 INJECTION, SOLUTION INTRAMUSCULAR; INTRAVENOUS at 21:40

## 2023-04-12 ASSESSMENT — PAIN SCALES - GENERAL
PAINLEVEL_OUTOF10: 7
PAINLEVEL_OUTOF10: 8

## 2023-04-12 ASSESSMENT — PAIN DESCRIPTION - PAIN TYPE: TYPE: ACUTE PAIN

## 2023-04-12 ASSESSMENT — PAIN DESCRIPTION - DESCRIPTORS: DESCRIPTORS: THROBBING

## 2023-04-12 ASSESSMENT — PAIN DESCRIPTION - LOCATION: LOCATION: ABDOMEN

## 2023-04-12 ASSESSMENT — PAIN - FUNCTIONAL ASSESSMENT: PAIN_FUNCTIONAL_ASSESSMENT: 0-10

## 2023-04-12 ASSESSMENT — PAIN DESCRIPTION - ONSET: ONSET: ON-GOING

## 2023-04-12 ASSESSMENT — PAIN DESCRIPTION - FREQUENCY: FREQUENCY: CONTINUOUS

## 2023-04-13 VITALS
SYSTOLIC BLOOD PRESSURE: 132 MMHG | TEMPERATURE: 98.6 F | OXYGEN SATURATION: 100 % | HEART RATE: 88 BPM | RESPIRATION RATE: 16 BRPM | DIASTOLIC BLOOD PRESSURE: 84 MMHG

## 2023-04-13 NOTE — ED PROVIDER NOTES
HPI   This is a 43-year-old female patient present to emergency department for evaluation of abdominal pain. Patient reporting she is having generalized pain. She was seen here few  days prior for similar. Now she states that her pain is in the right upper quadrant that radiates to her back. She states she has had kidney stones in the past it does not feel like that. She has past history of cholecystectomy, diabetic gastroparesis and esophageal strictures. She follows with [facility] gastroenterology and she states she has an appoint with him on Friday. Seen here and had a negative CT scan consistent with diarrheal illness few days prior. She denies any chest pain or shortness of breath. She is still having nausea vomiting and diarrhea. She has not been on any antibiotics. No blood in the vomit or stool. Review of Systems   Constitutional:  Negative for chills and fever. HENT:  Negative for congestion. Respiratory:  Negative for cough and shortness of breath. Cardiovascular:  Negative for chest pain. Gastrointestinal:  Positive for abdominal pain. Negative for diarrhea, nausea and vomiting. Genitourinary:  Negative for difficulty urinating, dysuria and hematuria. Musculoskeletal:  Negative for back pain. Skin:  Negative for color change. All other systems reviewed and are negative. Physical Exam  Vitals and nursing note reviewed. Constitutional:       Appearance: Normal appearance. HENT:      Head: Normocephalic and atraumatic. Nose: Nose normal. No congestion. Mouth/Throat:      Mouth: Mucous membranes are moist.      Pharynx: Oropharynx is clear. Eyes:      Conjunctiva/sclera: Conjunctivae normal.      Pupils: Pupils are equal, round, and reactive to light. Cardiovascular:      Rate and Rhythm: Normal rate and regular rhythm. Pulses: Normal pulses. Heart sounds: Normal heart sounds.    Pulmonary:      Effort: Pulmonary effort is normal. No

## 2023-04-13 NOTE — DISCHARGE INSTRUCTIONS
If symptoms change or worsen please return back please follow-up with your gastroenterologist.  Monitor for fevers.

## 2023-04-14 ASSESSMENT — ENCOUNTER SYMPTOMS
SHORTNESS OF BREATH: 0
NAUSEA: 0
COLOR CHANGE: 0
BACK PAIN: 0
COUGH: 0
ABDOMINAL PAIN: 1
VOMITING: 0
DIARRHEA: 0

## 2023-04-19 PROBLEM — N64.89 PSEUDOANGIOMATOUS STROMAL HYPERPLASIA OF BREAST: Status: ACTIVE | Noted: 2023-04-19

## 2023-04-19 PROBLEM — R29.898 WEAKNESS OF EXTREMITY: Status: ACTIVE | Noted: 2020-09-01

## 2023-04-19 PROBLEM — R33.9 RETENTION OF URINE: Status: ACTIVE | Noted: 2017-02-16

## 2023-04-19 PROBLEM — R13.19 OTHER DYSPHAGIA: Status: ACTIVE | Noted: 2018-09-18

## 2023-04-19 PROBLEM — J34.2 DEVIATED NASAL SEPTUM: Status: ACTIVE | Noted: 2023-04-19

## 2023-04-19 PROBLEM — J34.3 NASAL TURBINATE HYPERTROPHY: Status: ACTIVE | Noted: 2023-04-19

## 2023-04-19 PROBLEM — E28.2 POLYCYSTIC DISEASE, OVARIES: Status: ACTIVE | Noted: 2020-08-11

## 2023-04-19 PROBLEM — E66.9 OBESITY, CLASS II, BMI 35-39.9: Status: ACTIVE | Noted: 2019-01-11

## 2023-04-19 PROBLEM — J45.909 ASTHMA (HHS-HCC): Status: ACTIVE | Noted: 2023-04-19

## 2023-04-19 PROBLEM — H57.09 RELATIVE AFFERENT PUPILLARY DEFECT OF LEFT EYE: Status: ACTIVE | Noted: 2023-04-19

## 2023-04-19 PROBLEM — U07.1 COVID-19 VIRUS INFECTION: Status: ACTIVE | Noted: 2023-04-19

## 2023-04-19 PROBLEM — S63.501D WRIST SPRAIN, RIGHT, SUBSEQUENT ENCOUNTER: Status: ACTIVE | Noted: 2020-12-28

## 2023-04-19 PROBLEM — M51.27 HERNIATED NUCLEUS PULPOSUS OF LUMBOSACRAL REGION: Status: ACTIVE | Noted: 2023-04-19

## 2023-04-19 PROBLEM — J06.9 ACUTE UPPER RESPIRATORY INFECTION: Status: ACTIVE | Noted: 2020-11-12

## 2023-04-19 PROBLEM — M54.16 LUMBAR RADICULOPATHY: Status: ACTIVE | Noted: 2023-04-19

## 2023-04-19 PROBLEM — D83.9 CVID (COMMON VARIABLE IMMUNODEFICIENCY) (MULTI): Status: ACTIVE | Noted: 2019-03-05

## 2023-04-19 PROBLEM — R06.89 DIFFICULTY BREATHING: Status: ACTIVE | Noted: 2023-04-19

## 2023-04-19 PROBLEM — K31.84 GASTROPARESIS: Status: ACTIVE | Noted: 2020-08-11

## 2023-04-19 PROBLEM — M35.00 SJOGRENS SYNDROME (MULTI): Status: ACTIVE | Noted: 2023-04-19

## 2023-04-19 PROBLEM — M54.2 CERVICALGIA: Status: ACTIVE | Noted: 2020-08-12

## 2023-04-19 PROBLEM — G45.9 TIA (TRANSIENT ISCHEMIC ATTACK): Status: ACTIVE | Noted: 2020-08-27

## 2023-04-19 PROBLEM — R91.1 LUNG NODULE: Status: ACTIVE | Noted: 2018-12-06

## 2023-04-19 PROBLEM — H54.3 VISION LOSS, BILATERAL: Status: ACTIVE | Noted: 2018-10-01

## 2023-04-19 PROBLEM — G47.00 INSOMNIA: Status: ACTIVE | Noted: 2020-08-11

## 2023-04-19 PROBLEM — E66.812 OBESITY, CLASS II, BMI 35-39.9: Status: ACTIVE | Noted: 2019-01-11

## 2023-04-19 PROBLEM — H47.20 LEFT OPTIC NERVE ATROPHY: Status: ACTIVE | Noted: 2023-04-19

## 2023-04-19 PROBLEM — G25.81 RESTLESS LEGS SYNDROME: Status: ACTIVE | Noted: 2020-08-11

## 2023-04-19 PROBLEM — G43.909 MIGRAINE: Status: ACTIVE | Noted: 2020-08-11

## 2023-04-19 PROBLEM — G47.33 OSA ON CPAP: Status: ACTIVE | Noted: 2017-11-14

## 2023-04-19 PROBLEM — D84.9 IMMUNE DEFICIENCY DISORDER (MULTI): Status: ACTIVE | Noted: 2023-04-19

## 2023-04-19 PROBLEM — F41.9 ANXIETY: Status: ACTIVE | Noted: 2020-08-11

## 2023-04-19 PROBLEM — R09.81 NASAL CONGESTION: Status: ACTIVE | Noted: 2023-04-19

## 2023-04-19 PROBLEM — Z96.41 INSULIN PUMP STATUS: Status: ACTIVE | Noted: 2023-04-19

## 2023-04-19 PROBLEM — M54.12 CERVICAL RADICULOPATHY: Status: ACTIVE | Noted: 2023-04-19

## 2023-04-19 PROBLEM — I10 BENIGN ESSENTIAL HYPERTENSION: Status: ACTIVE | Noted: 2017-11-14

## 2023-04-19 PROBLEM — G93.2 BENIGN INTRACRANIAL HYPERTENSION: Status: ACTIVE | Noted: 2020-08-11

## 2023-04-19 PROBLEM — G43.719 INTRACTABLE CHRONIC MIGRAINE WITHOUT AURA AND WITHOUT STATUS MIGRAINOSUS: Status: ACTIVE | Noted: 2017-08-08

## 2023-04-19 PROBLEM — R56.9 FOCAL SEIZURES (MULTI): Status: ACTIVE | Noted: 2023-04-19

## 2023-04-19 PROBLEM — N60.01 SOLITARY CYST OF RIGHT BREAST: Status: ACTIVE | Noted: 2018-08-21

## 2023-04-19 PROBLEM — E55.9 VITAMIN D DEFICIENCY: Status: ACTIVE | Noted: 2023-04-19

## 2023-04-19 PROBLEM — R29.90 STROKE-LIKE SYMPTOMS: Status: ACTIVE | Noted: 2020-08-27

## 2023-04-19 PROBLEM — G40.109 LOCALIZATION-RELATED EPILEPSY (MULTI): Status: ACTIVE | Noted: 2023-04-19

## 2023-04-19 PROBLEM — R19.7 DIARRHEA: Status: ACTIVE | Noted: 2020-08-11

## 2023-04-19 PROBLEM — K59.00 CONSTIPATION: Status: ACTIVE | Noted: 2020-08-11

## 2023-04-19 PROBLEM — M43.12 SPONDYLOLISTHESIS OF CERVICAL REGION: Status: ACTIVE | Noted: 2023-04-19

## 2023-04-19 PROBLEM — D64.9 ANEMIA: Status: ACTIVE | Noted: 2020-08-11

## 2023-04-19 PROBLEM — S02.2XXS FRACTURE OF NASAL BONES, SEQUELA: Status: ACTIVE | Noted: 2023-04-19

## 2023-04-19 PROBLEM — E04.1 THYROID NODULE: Status: ACTIVE | Noted: 2020-08-11

## 2023-04-19 PROBLEM — M35.9 CONNECTIVE TISSUE DISORDER (MULTI): Status: ACTIVE | Noted: 2023-04-19

## 2023-04-19 PROBLEM — J12.9 VIRAL PNEUMONIA: Status: ACTIVE | Noted: 2021-01-06

## 2023-04-19 PROBLEM — R29.3 POOR POSTURE: Status: ACTIVE | Noted: 2023-04-19

## 2023-04-19 PROBLEM — G43.111 INTRACTABLE MIGRAINE WITH AURA WITH STATUS MIGRAINOSUS: Status: ACTIVE | Noted: 2023-04-19

## 2023-04-19 PROBLEM — M47.816 LUMBAR SPONDYLOSIS: Status: ACTIVE | Noted: 2023-04-19

## 2023-04-19 PROBLEM — K58.9 IRRITABLE BOWEL SYNDROME: Status: ACTIVE | Noted: 2020-08-11

## 2023-04-19 PROBLEM — J45.40 MODERATE PERSISTENT ALLERGIC ASTHMA (HHS-HCC): Status: ACTIVE | Noted: 2023-04-19

## 2023-04-19 PROBLEM — G43.109 COMPLICATED MIGRAINE: Status: ACTIVE | Noted: 2023-04-19

## 2023-04-19 PROBLEM — K21.9 GERD WITHOUT ESOPHAGITIS: Status: ACTIVE | Noted: 2023-04-19

## 2023-04-19 PROBLEM — N63.20 MASS OF LEFT BREAST: Status: ACTIVE | Noted: 2023-04-19

## 2023-04-19 PROBLEM — G40.909 EPILEPSY (MULTI): Status: ACTIVE | Noted: 2023-04-19

## 2023-04-19 PROBLEM — M50.30 DEGENERATION OF INTERVERTEBRAL DISC OF CERVICAL REGION: Status: ACTIVE | Noted: 2023-04-19

## 2023-04-19 PROBLEM — E66.01 MORBID OBESITY (MULTI): Status: ACTIVE | Noted: 2023-04-19

## 2023-04-19 PROBLEM — M35.01 SJOGREN'S SYNDROME WITH KERATOCONJUNCTIVITIS SICCA (MULTI): Status: ACTIVE | Noted: 2018-10-09

## 2023-04-19 PROBLEM — M48.07 SPINAL STENOSIS OF LUMBOSACRAL REGION: Status: ACTIVE | Noted: 2023-04-19

## 2023-04-19 PROBLEM — M79.7 PRIMARY FIBROMYALGIA SYNDROME: Status: ACTIVE | Noted: 2020-08-11

## 2023-04-19 PROBLEM — H47.012 NAION (NON-ARTERITIC ANTERIOR ISCHEMIC OPTIC NEUROPATHY), LEFT EYE: Status: ACTIVE | Noted: 2023-04-19

## 2023-04-19 PROBLEM — N39.0 ACUTE URINARY TRACT INFECTION: Status: ACTIVE | Noted: 2020-11-12

## 2023-04-19 PROBLEM — M19.90 DJD (DEGENERATIVE JOINT DISEASE): Status: ACTIVE | Noted: 2023-04-19

## 2023-04-19 PROBLEM — M54.50 LOWER BACK PAIN: Status: ACTIVE | Noted: 2023-04-19

## 2023-04-19 PROBLEM — M51.369 DEGENERATION OF INTERVERTEBRAL DISC OF LUMBAR REGION: Status: ACTIVE | Noted: 2023-04-19

## 2023-04-19 PROBLEM — S06.6XAA SUBARACHNOID HEMORRHAGE, TRAUMATIC (MULTI): Status: ACTIVE | Noted: 2023-04-19

## 2023-04-19 PROBLEM — M51.36 DEGENERATION OF INTERVERTEBRAL DISC OF LUMBAR REGION: Status: ACTIVE | Noted: 2023-04-19

## 2023-04-19 PROBLEM — I73.00 RAYNAUD'S PHENOMENON WITHOUT GANGRENE: Status: ACTIVE | Noted: 2017-10-22

## 2023-04-19 PROBLEM — R10.30 LOWER ABDOMINAL PAIN: Status: ACTIVE | Noted: 2020-10-15

## 2023-04-19 PROBLEM — M47.892 OTHER SPONDYLOSIS, CERVICAL REGION: Status: ACTIVE | Noted: 2023-04-19

## 2023-04-19 PROBLEM — Q30.9 NOSE ABNORMALITY: Status: ACTIVE | Noted: 2023-04-19

## 2023-04-19 PROBLEM — K21.9 GASTROESOPHAGEAL REFLUX DISEASE: Status: ACTIVE | Noted: 2017-02-09

## 2023-04-19 PROBLEM — R22.2 SUBCUTANEOUS NODULE OF ABDOMINAL WALL: Status: ACTIVE | Noted: 2020-10-05

## 2023-04-19 PROBLEM — H46.9 OPTIC NEURITIS: Status: ACTIVE | Noted: 2020-11-06

## 2023-04-19 PROBLEM — M48.02 SPINAL STENOSIS OF CERVICAL REGION: Status: ACTIVE | Noted: 2023-04-19

## 2023-04-19 PROBLEM — G90.A POSTURAL ORTHOSTATIC TACHYCARDIA SYNDROME: Status: ACTIVE | Noted: 2020-08-11

## 2023-04-19 PROBLEM — M15.3 POST-TRAUMATIC OSTEOARTHRITIS OF MULTIPLE JOINTS: Status: ACTIVE | Noted: 2023-04-19

## 2023-04-19 PROBLEM — F32.2 DEPRESSION, MAJOR, SINGLE EPISODE, SEVERE (MULTI): Status: ACTIVE | Noted: 2023-04-19

## 2023-04-19 RX ORDER — ONDANSETRON 4 MG/1
4 TABLET, ORALLY DISINTEGRATING ORAL
COMMUNITY
Start: 2023-03-29 | End: 2023-07-04

## 2023-04-19 RX ORDER — TALC
POWDER (GRAM) TOPICAL
COMMUNITY
Start: 2021-03-05

## 2023-04-19 RX ORDER — IBUPROFEN 100 MG/5ML
1 SUSPENSION, ORAL (FINAL DOSE FORM) ORAL DAILY
COMMUNITY
Start: 2022-11-02 | End: 2023-06-26

## 2023-04-19 RX ORDER — RIFAXIMIN 550 MG/1
1 TABLET ORAL 2 TIMES DAILY
COMMUNITY
Start: 2022-12-12 | End: 2023-04-20

## 2023-04-19 RX ORDER — AZELASTINE 1 MG/ML
2 SPRAY, METERED NASAL 2 TIMES DAILY
COMMUNITY
End: 2023-07-04

## 2023-04-19 RX ORDER — ALBUTEROL SULFATE 90 UG/1
AEROSOL, METERED RESPIRATORY (INHALATION)
COMMUNITY
Start: 2021-10-07 | End: 2023-04-20 | Stop reason: SDUPTHER

## 2023-04-19 RX ORDER — IMMUNE GLOBULIN INFUSION (HUMAN) 100 MG/ML
INJECTION, SOLUTION INTRAVENOUS; SUBCUTANEOUS
COMMUNITY
Start: 2021-03-02 | End: 2023-10-05 | Stop reason: WASHOUT

## 2023-04-19 RX ORDER — LEVOCETIRIZINE DIHYDROCHLORIDE 5 MG/1
1 TABLET, FILM COATED ORAL DAILY
COMMUNITY
Start: 2023-03-08 | End: 2024-06-07 | Stop reason: WASHOUT

## 2023-04-19 RX ORDER — FOLIC ACID 1 MG/1
1 TABLET ORAL DAILY
COMMUNITY
Start: 2018-05-31 | End: 2024-01-09 | Stop reason: SDUPTHER

## 2023-04-19 RX ORDER — BLOOD-GLUCOSE METER
EACH MISCELLANEOUS
COMMUNITY

## 2023-04-19 RX ORDER — FREMANEZUMAB-VFRM 225 MG/1.5ML
225 INJECTION SUBCUTANEOUS
COMMUNITY
End: 2023-04-20 | Stop reason: ALTCHOICE

## 2023-04-19 RX ORDER — PROPRANOLOL HYDROCHLORIDE 10 MG/1
1 TABLET ORAL 3 TIMES DAILY
COMMUNITY
Start: 2019-02-18 | End: 2023-06-30 | Stop reason: SDUPTHER

## 2023-04-19 RX ORDER — POTASSIUM CHLORIDE 1.5 G/1.58G
POWDER, FOR SOLUTION ORAL 2 TIMES DAILY
COMMUNITY
Start: 2021-02-17 | End: 2023-04-20 | Stop reason: ALTCHOICE

## 2023-04-19 RX ORDER — LANCETS 33 GAUGE
EACH MISCELLANEOUS
COMMUNITY
Start: 2023-01-30

## 2023-04-19 RX ORDER — DIBASIC SODIUM PHOSPHATE, MONOBASIC POTASSIUM PHOSPHATE AND MONOBASIC SODIUM PHOSPHATE 852; 155; 130 MG/1; MG/1; MG/1
1 TABLET ORAL 2 TIMES DAILY
COMMUNITY
Start: 2023-02-22 | End: 2023-04-20 | Stop reason: ALTCHOICE

## 2023-04-19 RX ORDER — MIDAZOLAM 5 MG/.1ML
SPRAY NASAL
COMMUNITY
Start: 2022-11-16

## 2023-04-19 RX ORDER — TIZANIDINE 2 MG/1
2 TABLET ORAL
COMMUNITY
Start: 2021-03-23 | End: 2023-10-05 | Stop reason: WASHOUT

## 2023-04-19 RX ORDER — EPINEPHRINE 0.3 MG/.3ML
INJECTION SUBCUTANEOUS
COMMUNITY
Start: 2019-08-21 | End: 2023-04-20 | Stop reason: SDUPTHER

## 2023-04-19 RX ORDER — CYCLOBENZAPRINE HCL 10 MG
TABLET ORAL
COMMUNITY
End: 2023-07-04 | Stop reason: ALTCHOICE

## 2023-04-19 RX ORDER — INSULIN GLARGINE 100 [IU]/ML
INJECTION, SOLUTION SUBCUTANEOUS
COMMUNITY
Start: 2020-11-10 | End: 2023-10-05 | Stop reason: WASHOUT

## 2023-04-19 RX ORDER — LEVOTHYROXINE SODIUM 100 UG/1
TABLET ORAL
COMMUNITY
Start: 2022-11-29 | End: 2023-04-20

## 2023-04-19 RX ORDER — TIOTROPIUM BROMIDE INHALATION SPRAY 1.56 UG/1
2 SPRAY, METERED RESPIRATORY (INHALATION) DAILY
COMMUNITY
Start: 2022-08-18 | End: 2023-04-20

## 2023-04-19 RX ORDER — INSULIN GLARGINE 100 [IU]/ML
INJECTION, SOLUTION SUBCUTANEOUS 2 TIMES DAILY
COMMUNITY
End: 2023-04-20 | Stop reason: ALTCHOICE

## 2023-04-19 RX ORDER — ACETAMINOPHEN 500 MG
TABLET ORAL
COMMUNITY
Start: 2022-11-02

## 2023-04-19 RX ORDER — PANTOPRAZOLE SODIUM 40 MG/1
TABLET, DELAYED RELEASE ORAL 2 TIMES DAILY
COMMUNITY
Start: 2021-05-27 | End: 2023-04-20 | Stop reason: ALTCHOICE

## 2023-04-19 RX ORDER — BACLOFEN 20 MG
1 TABLET ORAL DAILY
COMMUNITY
End: 2023-04-20 | Stop reason: ALTCHOICE

## 2023-04-19 RX ORDER — CLONAZEPAM 0.5 MG/1
1 TABLET ORAL 2 TIMES DAILY
COMMUNITY
Start: 2018-12-17 | End: 2023-10-05 | Stop reason: WASHOUT

## 2023-04-19 RX ORDER — PEN NEEDLE, DIABETIC 31 GX5/16"
NEEDLE, DISPOSABLE MISCELLANEOUS
COMMUNITY
Start: 2023-03-24

## 2023-04-19 RX ORDER — LEVETIRACETAM 1000 MG/1
TABLET ORAL
COMMUNITY
Start: 2022-09-21 | End: 2023-06-26

## 2023-04-19 RX ORDER — ASCORBIC ACID 500 MG
1 TABLET,CHEWABLE ORAL DAILY
COMMUNITY
Start: 2021-01-02 | End: 2023-06-26

## 2023-04-19 RX ORDER — FLUTICASONE PROPIONATE 50 MCG
1 SPRAY, SUSPENSION (ML) NASAL
COMMUNITY
End: 2023-08-03 | Stop reason: SDUPTHER

## 2023-04-19 RX ORDER — LACOSAMIDE 200 MG/1
1 TABLET ORAL 2 TIMES DAILY
COMMUNITY
Start: 2022-03-28

## 2023-04-19 RX ORDER — SEMAGLUTIDE 0.68 MG/ML
INJECTION, SOLUTION SUBCUTANEOUS
COMMUNITY
Start: 2023-03-31 | End: 2023-10-05 | Stop reason: WASHOUT

## 2023-04-19 RX ORDER — MONTELUKAST SODIUM 10 MG/1
1 TABLET ORAL NIGHTLY
COMMUNITY
Start: 2021-02-17

## 2023-04-19 RX ORDER — FUROSEMIDE 20 MG/1
1 TABLET ORAL 2 TIMES DAILY
COMMUNITY
Start: 2018-11-06 | End: 2023-04-28 | Stop reason: SDUPTHER

## 2023-04-19 RX ORDER — AMITRIPTYLINE HYDROCHLORIDE 25 MG/1
1 TABLET, FILM COATED ORAL NIGHTLY
COMMUNITY
Start: 2021-09-11

## 2023-04-19 RX ORDER — PEN NEEDLE, DIABETIC 29 G X1/2"
NEEDLE, DISPOSABLE MISCELLANEOUS
COMMUNITY
Start: 2022-09-05 | End: 2023-10-05 | Stop reason: WASHOUT

## 2023-04-19 RX ORDER — DIVALPROEX SODIUM 500 MG/1
1 TABLET, FILM COATED, EXTENDED RELEASE ORAL 2 TIMES DAILY
COMMUNITY
Start: 2020-12-24

## 2023-04-19 RX ORDER — DICYCLOMINE HYDROCHLORIDE 20 MG/1
1 TABLET ORAL 4 TIMES DAILY
COMMUNITY
Start: 2023-03-29 | End: 2023-07-04 | Stop reason: SINTOL

## 2023-04-19 RX ORDER — DAPAGLIFLOZIN 5 MG/1
1 TABLET, FILM COATED ORAL
COMMUNITY
Start: 2023-02-14 | End: 2023-10-12 | Stop reason: SDUPTHER

## 2023-04-19 RX ORDER — CARBOXYMETHYLCELLULOSE SODIUM 10 MG/ML
GEL OPHTHALMIC
COMMUNITY

## 2023-04-19 RX ORDER — UBROGEPANT 100 MG/1
1 TABLET ORAL AS NEEDED
COMMUNITY
Start: 2023-02-09 | End: 2023-10-05 | Stop reason: SDUPTHER

## 2023-04-19 RX ORDER — DIPHENHYDRAMINE HYDROCHLORIDE 50 MG/ML
INJECTION INTRAMUSCULAR; INTRAVENOUS
COMMUNITY
Start: 2023-03-09 | End: 2023-06-26

## 2023-04-19 RX ORDER — IPRATROPIUM BROMIDE AND ALBUTEROL SULFATE 2.5; .5 MG/3ML; MG/3ML
3 SOLUTION RESPIRATORY (INHALATION) 4 TIMES DAILY PRN
COMMUNITY

## 2023-04-19 RX ORDER — PRUCALOPRIDE 2 MG/1
1 TABLET, FILM COATED ORAL DAILY
COMMUNITY
Start: 2022-05-09

## 2023-04-19 RX ORDER — PROMETHAZINE HYDROCHLORIDE 25 MG/1
1 TABLET ORAL EVERY 6 HOURS PRN
COMMUNITY
Start: 2021-10-27 | End: 2023-10-05 | Stop reason: WASHOUT

## 2023-04-19 RX ORDER — LANSOPRAZOLE 30 MG/1
1 CAPSULE, DELAYED RELEASE ORAL 2 TIMES DAILY
COMMUNITY
Start: 2022-10-05 | End: 2023-07-04

## 2023-04-19 RX ORDER — INSULIN LISPRO 100 [IU]/ML
INJECTION, SOLUTION INTRAVENOUS; SUBCUTANEOUS
COMMUNITY

## 2023-04-19 RX ORDER — FLUTICASONE PROPIONATE AND SALMETEROL XINAFOATE 230; 21 UG/1; UG/1
2 AEROSOL, METERED RESPIRATORY (INHALATION) 2 TIMES DAILY
COMMUNITY
Start: 2021-02-17 | End: 2023-11-14 | Stop reason: ALTCHOICE

## 2023-04-19 RX ORDER — CLOTRIMAZOLE 10 MG/1
LOZENGE ORAL; TOPICAL
COMMUNITY
Start: 2022-12-27 | End: 2023-07-04 | Stop reason: ALTCHOICE

## 2023-04-19 RX ORDER — GLUCAGON 1 MG
VIAL (EA) INJECTION
COMMUNITY
Start: 2022-08-31 | End: 2024-06-07

## 2023-04-19 RX ORDER — ROPINIROLE 1 MG/1
TABLET, FILM COATED ORAL
COMMUNITY
End: 2023-06-26

## 2023-04-19 RX ORDER — ATOGEPANT 60 MG/1
1 TABLET ORAL DAILY
COMMUNITY
Start: 2023-02-09 | End: 2023-10-06 | Stop reason: ALTCHOICE

## 2023-04-19 RX ORDER — ACETAMINOPHEN 325 MG/1
TABLET ORAL EVERY 6 HOURS
COMMUNITY
End: 2023-06-28 | Stop reason: SDUPTHER

## 2023-04-20 ENCOUNTER — OFFICE VISIT (OUTPATIENT)
Dept: PRIMARY CARE | Facility: CLINIC | Age: 48
End: 2023-04-20
Payer: MEDICAID

## 2023-04-20 VITALS
WEIGHT: 249 LBS | SYSTOLIC BLOOD PRESSURE: 134 MMHG | BODY MASS INDEX: 45.82 KG/M2 | HEART RATE: 61 BPM | RESPIRATION RATE: 16 BRPM | HEIGHT: 62 IN | DIASTOLIC BLOOD PRESSURE: 74 MMHG | OXYGEN SATURATION: 94 %

## 2023-04-20 DIAGNOSIS — R56.9 FOCAL SEIZURES (MULTI): ICD-10-CM

## 2023-04-20 DIAGNOSIS — D83.9 CVID (COMMON VARIABLE IMMUNODEFICIENCY) (MULTI): Primary | ICD-10-CM

## 2023-04-20 DIAGNOSIS — E28.2 POLYCYSTIC DISEASE, OVARIES: ICD-10-CM

## 2023-04-20 DIAGNOSIS — J45.40 MODERATE PERSISTENT ALLERGIC ASTHMA (HHS-HCC): ICD-10-CM

## 2023-04-20 DIAGNOSIS — J45.40 MODERATE PERSISTENT ASTHMA WITHOUT COMPLICATION (HHS-HCC): ICD-10-CM

## 2023-04-20 DIAGNOSIS — G25.81 RESTLESS LEGS SYNDROME: ICD-10-CM

## 2023-04-20 DIAGNOSIS — E03.8 HYPOTHYROIDISM DUE TO HASHIMOTO'S THYROIDITIS: ICD-10-CM

## 2023-04-20 DIAGNOSIS — G93.2 BENIGN INTRACRANIAL HYPERTENSION: ICD-10-CM

## 2023-04-20 DIAGNOSIS — F32.2 DEPRESSION, MAJOR, SINGLE EPISODE, SEVERE (MULTI): ICD-10-CM

## 2023-04-20 DIAGNOSIS — M35.01 SJOGREN'S SYNDROME WITH KERATOCONJUNCTIVITIS SICCA (MULTI): ICD-10-CM

## 2023-04-20 DIAGNOSIS — G43.109 COMPLICATED MIGRAINE: ICD-10-CM

## 2023-04-20 DIAGNOSIS — H46.9 OPTIC NEURITIS: ICD-10-CM

## 2023-04-20 DIAGNOSIS — G47.33 OSA ON CPAP: ICD-10-CM

## 2023-04-20 DIAGNOSIS — E06.3 HYPOTHYROIDISM DUE TO HASHIMOTO'S THYROIDITIS: ICD-10-CM

## 2023-04-20 PROBLEM — R09.81 NASAL CONGESTION: Status: RESOLVED | Noted: 2023-04-19 | Resolved: 2023-04-20

## 2023-04-20 PROBLEM — R22.2 SUBCUTANEOUS NODULE OF ABDOMINAL WALL: Status: RESOLVED | Noted: 2020-10-05 | Resolved: 2023-04-20

## 2023-04-20 PROBLEM — M50.30 DEGENERATION OF INTERVERTEBRAL DISC OF CERVICAL REGION: Status: RESOLVED | Noted: 2023-04-19 | Resolved: 2023-04-20

## 2023-04-20 PROBLEM — N39.0 ACUTE URINARY TRACT INFECTION: Status: RESOLVED | Noted: 2020-11-12 | Resolved: 2023-04-20

## 2023-04-20 PROBLEM — M35.9 CONNECTIVE TISSUE DISORDER (MULTI): Status: RESOLVED | Noted: 2023-04-19 | Resolved: 2023-04-20

## 2023-04-20 PROBLEM — M54.2 CERVICALGIA: Status: RESOLVED | Noted: 2020-08-12 | Resolved: 2023-04-20

## 2023-04-20 PROBLEM — M48.07 SPINAL STENOSIS OF LUMBOSACRAL REGION: Status: RESOLVED | Noted: 2023-04-19 | Resolved: 2023-04-20

## 2023-04-20 PROBLEM — M48.02 SPINAL STENOSIS OF CERVICAL REGION: Status: RESOLVED | Noted: 2023-04-19 | Resolved: 2023-04-20

## 2023-04-20 PROBLEM — M47.816 LUMBAR SPONDYLOSIS: Status: RESOLVED | Noted: 2023-04-19 | Resolved: 2023-04-20

## 2023-04-20 PROBLEM — R06.89 DIFFICULTY BREATHING: Status: RESOLVED | Noted: 2023-04-19 | Resolved: 2023-04-20

## 2023-04-20 PROBLEM — R29.90 STROKE-LIKE SYMPTOMS: Status: RESOLVED | Noted: 2020-08-27 | Resolved: 2023-04-20

## 2023-04-20 PROBLEM — M54.50 LOWER BACK PAIN: Status: RESOLVED | Noted: 2023-04-19 | Resolved: 2023-04-20

## 2023-04-20 PROBLEM — M51.27 HERNIATED NUCLEUS PULPOSUS OF LUMBOSACRAL REGION: Status: RESOLVED | Noted: 2023-04-19 | Resolved: 2023-04-20

## 2023-04-20 PROBLEM — N64.89 PSEUDOANGIOMATOUS STROMAL HYPERPLASIA OF BREAST: Status: RESOLVED | Noted: 2023-04-19 | Resolved: 2023-04-20

## 2023-04-20 PROBLEM — R29.898 WEAKNESS OF EXTREMITY: Status: RESOLVED | Noted: 2020-09-01 | Resolved: 2023-04-20

## 2023-04-20 PROBLEM — E66.812 OBESITY, CLASS II, BMI 35-39.9: Status: RESOLVED | Noted: 2019-01-11 | Resolved: 2023-04-20

## 2023-04-20 PROBLEM — M35.00 SJOGRENS SYNDROME (MULTI): Status: RESOLVED | Noted: 2023-04-19 | Resolved: 2023-04-20

## 2023-04-20 PROBLEM — J34.2 DEVIATED NASAL SEPTUM: Status: RESOLVED | Noted: 2023-04-19 | Resolved: 2023-04-20

## 2023-04-20 PROBLEM — R33.9 RETENTION OF URINE: Status: RESOLVED | Noted: 2017-02-16 | Resolved: 2023-04-20

## 2023-04-20 PROBLEM — G43.111 INTRACTABLE MIGRAINE WITH AURA WITH STATUS MIGRAINOSUS: Status: RESOLVED | Noted: 2023-04-19 | Resolved: 2023-04-20

## 2023-04-20 PROBLEM — G43.909 MIGRAINE: Status: RESOLVED | Noted: 2020-08-11 | Resolved: 2023-04-20

## 2023-04-20 PROBLEM — K21.9 GASTROESOPHAGEAL REFLUX DISEASE: Status: RESOLVED | Noted: 2017-02-09 | Resolved: 2023-04-20

## 2023-04-20 PROBLEM — R13.19 OTHER DYSPHAGIA: Status: RESOLVED | Noted: 2018-09-18 | Resolved: 2023-04-20

## 2023-04-20 PROBLEM — E66.01 MORBID OBESITY (MULTI): Status: RESOLVED | Noted: 2023-04-19 | Resolved: 2023-04-20

## 2023-04-20 PROBLEM — M79.7 PRIMARY FIBROMYALGIA SYNDROME: Status: RESOLVED | Noted: 2020-08-11 | Resolved: 2023-04-20

## 2023-04-20 PROBLEM — K59.00 CONSTIPATION: Status: RESOLVED | Noted: 2020-08-11 | Resolved: 2023-04-20

## 2023-04-20 PROBLEM — M54.12 CERVICAL RADICULOPATHY: Status: RESOLVED | Noted: 2023-04-19 | Resolved: 2023-04-20

## 2023-04-20 PROBLEM — N63.20 MASS OF LEFT BREAST: Status: RESOLVED | Noted: 2023-04-19 | Resolved: 2023-04-20

## 2023-04-20 PROBLEM — M51.36 DEGENERATION OF INTERVERTEBRAL DISC OF LUMBAR REGION: Status: RESOLVED | Noted: 2023-04-19 | Resolved: 2023-04-20

## 2023-04-20 PROBLEM — J34.3 NASAL TURBINATE HYPERTROPHY: Status: RESOLVED | Noted: 2023-04-19 | Resolved: 2023-04-20

## 2023-04-20 PROBLEM — J45.909 ASTHMA (HHS-HCC): Status: RESOLVED | Noted: 2023-04-19 | Resolved: 2023-04-20

## 2023-04-20 PROBLEM — N60.01 SOLITARY CYST OF RIGHT BREAST: Status: RESOLVED | Noted: 2018-08-21 | Resolved: 2023-04-20

## 2023-04-20 PROBLEM — M15.3 POST-TRAUMATIC OSTEOARTHRITIS OF MULTIPLE JOINTS: Status: RESOLVED | Noted: 2023-04-19 | Resolved: 2023-04-20

## 2023-04-20 PROBLEM — G43.719 INTRACTABLE CHRONIC MIGRAINE WITHOUT AURA AND WITHOUT STATUS MIGRAINOSUS: Status: RESOLVED | Noted: 2017-08-08 | Resolved: 2023-04-20

## 2023-04-20 PROBLEM — S02.2XXS FRACTURE OF NASAL BONES, SEQUELA: Status: RESOLVED | Noted: 2023-04-19 | Resolved: 2023-04-20

## 2023-04-20 PROBLEM — M47.892 OTHER SPONDYLOSIS, CERVICAL REGION: Status: RESOLVED | Noted: 2023-04-19 | Resolved: 2023-04-20

## 2023-04-20 PROBLEM — Q30.9 NOSE ABNORMALITY: Status: RESOLVED | Noted: 2023-04-19 | Resolved: 2023-04-20

## 2023-04-20 PROBLEM — H54.3 VISION LOSS, BILATERAL: Status: RESOLVED | Noted: 2018-10-01 | Resolved: 2023-04-20

## 2023-04-20 PROBLEM — S63.501D WRIST SPRAIN, RIGHT, SUBSEQUENT ENCOUNTER: Status: RESOLVED | Noted: 2020-12-28 | Resolved: 2023-04-20

## 2023-04-20 PROBLEM — E55.9 VITAMIN D DEFICIENCY: Status: RESOLVED | Noted: 2023-04-19 | Resolved: 2023-04-20

## 2023-04-20 PROBLEM — J12.9 VIRAL PNEUMONIA: Status: RESOLVED | Noted: 2021-01-06 | Resolved: 2023-04-20

## 2023-04-20 PROBLEM — D64.9 ANEMIA: Status: RESOLVED | Noted: 2020-08-11 | Resolved: 2023-04-20

## 2023-04-20 PROBLEM — Z96.41 INSULIN PUMP STATUS: Status: RESOLVED | Noted: 2023-04-19 | Resolved: 2023-04-20

## 2023-04-20 PROBLEM — H47.20 LEFT OPTIC NERVE ATROPHY: Status: RESOLVED | Noted: 2023-04-19 | Resolved: 2023-04-20

## 2023-04-20 PROBLEM — J06.9 ACUTE UPPER RESPIRATORY INFECTION: Status: RESOLVED | Noted: 2020-11-12 | Resolved: 2023-04-20

## 2023-04-20 PROBLEM — R10.30 LOWER ABDOMINAL PAIN: Status: RESOLVED | Noted: 2020-10-15 | Resolved: 2023-04-20

## 2023-04-20 PROBLEM — M43.12 SPONDYLOLISTHESIS OF CERVICAL REGION: Status: RESOLVED | Noted: 2023-04-19 | Resolved: 2023-04-20

## 2023-04-20 PROBLEM — D84.9 IMMUNE DEFICIENCY DISORDER (MULTI): Status: RESOLVED | Noted: 2023-04-19 | Resolved: 2023-04-20

## 2023-04-20 PROBLEM — S06.6XAA SUBARACHNOID HEMORRHAGE, TRAUMATIC (MULTI): Status: RESOLVED | Noted: 2023-04-19 | Resolved: 2023-04-20

## 2023-04-20 PROBLEM — E66.9 OBESITY, CLASS II, BMI 35-39.9: Status: RESOLVED | Noted: 2019-01-11 | Resolved: 2023-04-20

## 2023-04-20 PROBLEM — R29.3 POOR POSTURE: Status: RESOLVED | Noted: 2023-04-19 | Resolved: 2023-04-20

## 2023-04-20 PROBLEM — M51.369 DEGENERATION OF INTERVERTEBRAL DISC OF LUMBAR REGION: Status: RESOLVED | Noted: 2023-04-19 | Resolved: 2023-04-20

## 2023-04-20 PROBLEM — G45.9 TIA (TRANSIENT ISCHEMIC ATTACK): Status: RESOLVED | Noted: 2020-08-27 | Resolved: 2023-04-20

## 2023-04-20 LAB
CORTISOL (UG/DL) IN SERUM - AM: 0.9 UG/DL (ref 5–20)
DEHYDROEPIANDROSTERONE SULFATE (DHEA-S) (UG/DL) IN SER/: 7 UG/DL (ref 12–379)
ESTRADIOL (PG/ML) IN SER/PLAS: 77 PG/ML
FOLLITROPIN (IU/L) IN SER/PLAS: 17.5 IU/L
LUTEINIZING HORMONE (IU/ML) IN SER/PLAS: 14 IU/L

## 2023-04-20 PROCEDURE — 3075F SYST BP GE 130 - 139MM HG: CPT | Performed by: INTERNAL MEDICINE

## 2023-04-20 PROCEDURE — 3078F DIAST BP <80 MM HG: CPT | Performed by: INTERNAL MEDICINE

## 2023-04-20 PROCEDURE — 99214 OFFICE O/P EST MOD 30 MIN: CPT | Performed by: INTERNAL MEDICINE

## 2023-04-20 PROCEDURE — 1036F TOBACCO NON-USER: CPT | Performed by: INTERNAL MEDICINE

## 2023-04-20 PROCEDURE — 3008F BODY MASS INDEX DOCD: CPT | Performed by: INTERNAL MEDICINE

## 2023-04-20 RX ORDER — ALBUTEROL SULFATE 90 UG/1
2 AEROSOL, METERED RESPIRATORY (INHALATION)
COMMUNITY
Start: 2018-03-08 | End: 2023-10-05

## 2023-04-20 RX ORDER — CIMETIDINE 300 MG/1
300 TABLET, FILM COATED ORAL 2 TIMES DAILY
COMMUNITY
Start: 2022-09-18 | End: 2024-01-18 | Stop reason: ALTCHOICE

## 2023-04-20 RX ORDER — EPINEPHRINE 0.3 MG/.3ML
INJECTION SUBCUTANEOUS
Qty: 1 EACH | Refills: 6 | Status: SHIPPED | OUTPATIENT
Start: 2023-04-20 | End: 2024-05-17

## 2023-04-20 RX ORDER — CALCIUM CARB/VITAMIN D3/VIT K1 650MG-12.5
1 TABLET,CHEWABLE ORAL DAILY
COMMUNITY
Start: 2022-09-18 | End: 2024-01-04 | Stop reason: WASHOUT

## 2023-04-20 RX ORDER — ONDANSETRON 4 MG/1
8 TABLET, FILM COATED ORAL EVERY 8 HOURS PRN
COMMUNITY
Start: 2023-04-11 | End: 2023-04-20 | Stop reason: ALTCHOICE

## 2023-04-20 RX ORDER — HYOSCYAMINE SULFATE 0.12 MG/1
0.12 TABLET SUBLINGUAL EVERY 4 HOURS PRN
COMMUNITY
Start: 2022-10-14 | End: 2023-07-04

## 2023-04-20 RX ORDER — ACETAMINOPHEN 160 MG/5ML
325 SUSPENSION ORAL EVERY 4 HOURS PRN
COMMUNITY
Start: 2022-09-18 | End: 2023-10-05

## 2023-04-20 ASSESSMENT — PATIENT HEALTH QUESTIONNAIRE - PHQ9
SUM OF ALL RESPONSES TO PHQ9 QUESTIONS 1 AND 2: 0
2. FEELING DOWN, DEPRESSED OR HOPELESS: NOT AT ALL
1. LITTLE INTEREST OR PLEASURE IN DOING THINGS: NOT AT ALL

## 2023-04-20 ASSESSMENT — ANXIETY QUESTIONNAIRES
IF YOU CHECKED OFF ANY PROBLEMS ON THIS QUESTIONNAIRE, HOW DIFFICULT HAVE THESE PROBLEMS MADE IT FOR YOU TO DO YOUR WORK, TAKE CARE OF THINGS AT HOME, OR GET ALONG WITH OTHER PEOPLE: NOT DIFFICULT AT ALL
2. NOT BEING ABLE TO STOP OR CONTROL WORRYING: NOT AT ALL
7. FEELING AFRAID AS IF SOMETHING AWFUL MIGHT HAPPEN: NOT AT ALL
5. BEING SO RESTLESS THAT IT IS HARD TO SIT STILL: NOT AT ALL
6. BECOMING EASILY ANNOYED OR IRRITABLE: NOT AT ALL
GAD7 TOTAL SCORE: 0
3. WORRYING TOO MUCH ABOUT DIFFERENT THINGS: NOT AT ALL
4. TROUBLE RELAXING: NOT AT ALL
1. FEELING NERVOUS, ANXIOUS, OR ON EDGE: NOT AT ALL

## 2023-04-20 ASSESSMENT — ENCOUNTER SYMPTOMS
LOSS OF SENSATION IN FEET: 0
OCCASIONAL FEELINGS OF UNSTEADINESS: 0
DEPRESSION: 0

## 2023-04-20 NOTE — ASSESSMENT & PLAN NOTE
evaluated by endocrine        Recent hormonal lab work ordered by endocrinologist continue to follow

## 2023-04-20 NOTE — ASSESSMENT & PLAN NOTE
>>ASSESSMENT AND PLAN FOR DEPRESSION, MAJOR, SINGLE EPISODE, SEVERE (CMS/HCC) WRITTEN ON 4/20/2023  2:39 PM BY CHELSEY PAEZ, DO        Continues to see psychiatrist and psychology counselor stable

## 2023-04-20 NOTE — PROGRESS NOTES
"I Subjective   Reason for Visit: Jonathan Ayala is an 47 y.o. female here for a fu visit.     Past Medical, Surgical, and Family History reviewed and updated in chart.    Reviewed all medications by prescribing practitioner or clinical pharmacist (such as prescriptions, OTCs, herbal therapies and supplements) and documented in the medical record.    HPI    Patient Care Team:  Isidoro Mensah DO as PCP - General     Review of Systems   All other systems reviewed and are negative.      Objective   Vitals:  /74   Pulse 61   Resp 16   Ht 1.575 m (5' 2\")   Wt 113 kg (249 lb)   SpO2 94%   BMI 45.54 kg/m²       Physical Exam  Vitals and nursing note reviewed.   Constitutional:       General: She is not in acute distress.     Appearance: Normal appearance. She is well-developed. She is not toxic-appearing.   HENT:      Head: Normocephalic and atraumatic.      Right Ear: Tympanic membrane and external ear normal.      Left Ear: Tympanic membrane and external ear normal.      Nose: Nose normal.      Mouth/Throat:      Mouth: Mucous membranes are moist.      Pharynx: Oropharynx is clear. No oropharyngeal exudate or posterior oropharyngeal erythema.      Tonsils: No tonsillar exudate. 2+ on the right. 2+ on the left.   Eyes:      Extraocular Movements: Extraocular movements intact.      Conjunctiva/sclera: Conjunctivae normal.   Cardiovascular:      Rate and Rhythm: Normal rate and regular rhythm.      Pulses: Normal pulses.      Heart sounds: Normal heart sounds. No murmur heard.  Pulmonary:      Effort: Pulmonary effort is normal.      Breath sounds: Normal breath sounds.   Abdominal:      General: Abdomen is flat. Bowel sounds are normal.      Palpations: Abdomen is soft.   Musculoskeletal:      Cervical back: Neck supple.   Lymphadenopathy:      Cervical: No cervical adenopathy.   Skin:     General: Skin is warm and dry.      Findings: No rash.   Neurological:      Mental Status: She is alert. Mental status " is at baseline.   Psychiatric:         Mood and Affect: Mood normal.         Behavior: Behavior normal.         Thought Content: Thought content normal.         Judgment: Judgment normal.         Assessment/Plan   Problem List Items Addressed This Visit          Nervous    Benign intracranial hypertension    Current Assessment & Plan     Continues to follow with neurologist managing with diuretic therapy with furosemide blood pressure stable         Focal seizures (CMS/HCC)    Current Assessment & Plan     Continue seizure prophylaxis with lacosamide and valproic acid         Optic neuritis    Overview     Last Assessment & Plan: Formatting of this note might be different from the original. Follow-up with  neuro-ophthalmologist at [facility]         BRENT on CPAP    Overview     Formatting of this note might be different from the original. Overview: does well on CPAP at home, plan to continue Last Assessment & Plan: Formatting of this note might be different from the original. Continue CPAP, does well on it         Current Assessment & Plan     Compliant with regular nighttime use of CPAP         Restless legs syndrome    Overview     Last Assessment & Plan: Formatting of this note might be different from the original. Continue ropinirole, prescription management         Current Assessment & Plan     Continue Requip stable at this time            Respiratory    Moderate persistent allergic asthma    Current Assessment & Plan     Stable no exacerbation back on Advair discontinued Spiriva         RESOLVED: Asthma    Overview     Last Assessment & Plan: Formatting of this note might be different from the original. Finish up her Decadron taper.  Continue her inhalers and montelukast.  Send to her pulmonary specialist for pulmonary clearance prior to surgery. She is obese.  If they end up doing a laparotomy instead of a laparoscopy then her breathing may be compromised and she may be at risk for hypostatic  pneumonitis.            Endocrine/Metabolic    Hypothyroidism    Overview     Formatting of this note might be different from the original. Overview: C/w home meds Last Assessment & Plan: Formatting of this note might be different from the original. Continue -2 doses of levothyroxine.  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 Intravenous immunoglobulin G as directed by hematologist         Current Assessment & Plan     Pulmonologist taking over therapy increase Gammagard to 60 mg IV twice a month to improve overall immunoglobulin levels and reduce risk of recurrent infection            Other    Depression, major, single episode, severe (CMS/HCC)    Current Assessment & Plan         Continues to see psychiatrist and psychology counselor stable         Complicated migraine    Current Assessment & Plan     Stable at this time continue ubrelvy 100mg daily          Sjogren's syndrome with keratoconjunctivitis sicca (CMS/[facility])    Current Assessment & Plan     Follows closely with rheumatologist

## 2023-04-20 NOTE — ASSESSMENT & PLAN NOTE
Pulmonologist taking over therapy increase Gammagard to 60 mg IV twice a month to improve overall immunoglobulin levels and reduce risk of recurrent infection

## 2023-04-20 NOTE — ASSESSMENT & PLAN NOTE
Continues to follow with neurologist managing with diuretic therapy with furosemide blood pressure stable

## 2023-04-23 ENCOUNTER — HOSPITAL ENCOUNTER (EMERGENCY)
Age: 48
Discharge: HOME | End: 2023-04-23
Attending: EMERGENCY MEDICINE
Payer: MEDICAID

## 2023-04-23 VITALS
WEIGHT: 242 LBS | HEART RATE: 88 BPM | OXYGEN SATURATION: 98 % | HEIGHT: 62 IN | SYSTOLIC BLOOD PRESSURE: 144 MMHG | TEMPERATURE: 98 F | DIASTOLIC BLOOD PRESSURE: 88 MMHG | BODY MASS INDEX: 44.53 KG/M2 | RESPIRATION RATE: 16 BRPM

## 2023-04-23 DIAGNOSIS — R11.2 NAUSEA AND VOMITING, UNSPECIFIED VOMITING TYPE: Primary | ICD-10-CM

## 2023-04-23 DIAGNOSIS — R10.9 ABDOMINAL PAIN, UNSPECIFIED ABDOMINAL LOCATION: ICD-10-CM

## 2023-04-23 LAB
ALBUMIN SERPL-MCNC: 4.1 G/DL (ref 3.5–5.2)
ALP SERPL-CCNC: 76 U/L (ref 35–104)
ALT SERPL-CCNC: 21 U/L (ref 0–32)
ANION GAP SERPL CALCULATED.3IONS-SCNC: 11 MMOL/L (ref 7–16)
AST SERPL-CCNC: 17 U/L (ref 0–31)
BASOPHILS # BLD: 0.03 E9/L (ref 0–0.2)
BASOPHILS NFR BLD: 0.7 % (ref 0–2)
BILIRUB SERPL-MCNC: 0.3 MG/DL (ref 0–1.2)
BUN SERPL-MCNC: 10 MG/DL (ref 6–20)
CALCIUM SERPL-MCNC: 9.5 MG/DL (ref 8.6–10.2)
CHLORIDE SERPL-SCNC: 100 MMOL/L (ref 98–107)
CO2 SERPL-SCNC: 29 MMOL/L (ref 22–29)
CREAT SERPL-MCNC: 0.8 MG/DL (ref 0.5–1)
EOSINOPHIL # BLD: 0.05 E9/L (ref 0.05–0.5)
EOSINOPHIL NFR BLD: 1.2 % (ref 0–6)
ERYTHROCYTE [DISTWIDTH] IN BLOOD BY AUTOMATED COUNT: 13.7 FL (ref 11.5–15)
GLUCOSE SERPL-MCNC: 103 MG/DL (ref 74–99)
HCT VFR BLD AUTO: 44.2 % (ref 34–48)
HGB BLD-MCNC: 14.2 G/DL (ref 11.5–15.5)
IMM GRANULOCYTES # BLD: 0.02 E9/L
IMM GRANULOCYTES NFR BLD: 0.5 % (ref 0–5)
LIPASE: 25 U/L (ref 13–60)
LYMPHOCYTES # BLD: 1.59 E9/L (ref 1.5–4)
LYMPHOCYTES NFR BLD: 38.6 % (ref 20–42)
MCH RBC QN AUTO: 28.7 PG (ref 26–35)
MCHC RBC AUTO-ENTMCNC: 32.1 % (ref 32–34.5)
MCV RBC AUTO: 89.5 FL (ref 80–99.9)
MONOCYTES # BLD: 0.32 E9/L (ref 0.1–0.95)
MONOCYTES NFR BLD: 7.8 % (ref 2–12)
NEUTROPHILS # BLD: 2.11 E9/L (ref 1.8–7.3)
NEUTS SEG NFR BLD: 51.2 % (ref 43–80)
PLATELET # BLD AUTO: 253 E9/L (ref 130–450)
PMV BLD AUTO: 9.6 FL (ref 7–12)
POTASSIUM SERPL-SCNC: 3.9 MMOL/L (ref 3.5–5)
PROT SERPL-MCNC: 7.4 G/DL (ref 6.4–8.3)
RBC # BLD AUTO: 4.94 E12/L (ref 3.5–5.5)
SODIUM SERPL-SCNC: 140 MMOL/L (ref 132–146)
WBC # BLD: 4.1 E9/L (ref 4.5–11.5)

## 2023-04-23 PROCEDURE — 99284 EMERGENCY DEPT VISIT MOD MDM: CPT

## 2023-04-23 PROCEDURE — 96374 THER/PROPH/DIAG INJ IV PUSH: CPT

## 2023-04-23 PROCEDURE — 96375 TX/PRO/DX INJ NEW DRUG ADDON: CPT

## 2023-04-23 PROCEDURE — 85025 COMPLETE CBC W/AUTO DIFF WBC: CPT

## 2023-04-23 PROCEDURE — 6360000002 HC RX W HCPCS: Performed by: EMERGENCY MEDICINE

## 2023-04-23 PROCEDURE — 83690 ASSAY OF LIPASE: CPT

## 2023-04-23 PROCEDURE — 96372 THER/PROPH/DIAG INJ SC/IM: CPT

## 2023-04-23 PROCEDURE — 96361 HYDRATE IV INFUSION ADD-ON: CPT

## 2023-04-23 PROCEDURE — 2580000003 HC RX 258: Performed by: EMERGENCY MEDICINE

## 2023-04-23 PROCEDURE — 36415 COLL VENOUS BLD VENIPUNCTURE: CPT

## 2023-04-23 PROCEDURE — 80053 COMPREHEN METABOLIC PANEL: CPT

## 2023-04-23 RX ORDER — ONDANSETRON 2 MG/ML
4 INJECTION INTRAMUSCULAR; INTRAVENOUS ONCE
Status: COMPLETED | OUTPATIENT
Start: 2023-04-23 | End: 2023-04-23

## 2023-04-23 RX ORDER — FENTANYL CITRATE 50 UG/ML
50 INJECTION, SOLUTION INTRAMUSCULAR; INTRAVENOUS ONCE
Status: COMPLETED | OUTPATIENT
Start: 2023-04-23 | End: 2023-04-23

## 2023-04-23 RX ORDER — 0.9 % SODIUM CHLORIDE 0.9 %
1000 INTRAVENOUS SOLUTION INTRAVENOUS ONCE
Status: COMPLETED | OUTPATIENT
Start: 2023-04-23 | End: 2023-04-23

## 2023-04-23 RX ORDER — HALOPERIDOL 5 MG/ML
5 INJECTION INTRAMUSCULAR ONCE
Status: COMPLETED | OUTPATIENT
Start: 2023-04-23 | End: 2023-04-23

## 2023-04-23 RX ADMIN — FENTANYL CITRATE 50 MCG: 50 INJECTION, SOLUTION INTRAMUSCULAR; INTRAVENOUS at 20:58

## 2023-04-23 RX ADMIN — SODIUM CHLORIDE 1000 ML: 9 INJECTION, SOLUTION INTRAVENOUS at 20:57

## 2023-04-23 RX ADMIN — HALOPERIDOL LACTATE 5 MG: 5 INJECTION, SOLUTION INTRAMUSCULAR at 22:09

## 2023-04-23 RX ADMIN — ONDANSETRON 4 MG: 2 INJECTION INTRAMUSCULAR; INTRAVENOUS at 20:58

## 2023-04-23 ASSESSMENT — LIFESTYLE VARIABLES
HOW OFTEN DO YOU HAVE A DRINK CONTAINING ALCOHOL: NEVER
HOW MANY STANDARD DRINKS CONTAINING ALCOHOL DO YOU HAVE ON A TYPICAL DAY: PATIENT DOES NOT DRINK

## 2023-04-23 ASSESSMENT — PAIN - FUNCTIONAL ASSESSMENT
PAIN_FUNCTIONAL_ASSESSMENT: NONE - DENIES PAIN
PAIN_FUNCTIONAL_ASSESSMENT: NONE - DENIES PAIN
PAIN_FUNCTIONAL_ASSESSMENT: 0-10

## 2023-04-23 ASSESSMENT — PAIN DESCRIPTION - LOCATION: LOCATION: ABDOMEN

## 2023-04-23 ASSESSMENT — PAIN DESCRIPTION - PAIN TYPE: TYPE: ACUTE PAIN

## 2023-04-23 ASSESSMENT — PAIN DESCRIPTION - DESCRIPTORS: DESCRIPTORS: ACHING

## 2023-04-23 ASSESSMENT — PAIN SCALES - GENERAL: PAINLEVEL_OUTOF10: 6

## 2023-04-23 ASSESSMENT — PAIN DESCRIPTION - FREQUENCY: FREQUENCY: CONTINUOUS

## 2023-04-24 LAB — ADRENOCORTICOTROPIC HORMONE: 12.9 PG/ML (ref 7.2–63.3)

## 2023-04-24 ASSESSMENT — ENCOUNTER SYMPTOMS
COUGH: 0
SHORTNESS OF BREATH: 0
BACK PAIN: 0
ABDOMINAL PAIN: 1

## 2023-04-24 NOTE — ED PROVIDER NOTES
This is a 44-year-old female with a past medical history including sleep apnea PCOS GERD and IBS who presents to the ED for evaluation of nausea. Patient states has been having ongoing bowel issues and abdominal pain. Patient has been seen several times and is following with an GI physician. Patient has had multiple CAT scans in the past several months all of which have not shown no acute pathology. Patient states she was prescribed Compazine and Zofran states over the past several days she still cannot keep anything down and throws these medications up. Patient states she still continues to have pain in her upper abdomen. No reported lower abdominal pain. No reported fevers or chills. No other reported mitigating or exacerbating factors. The history is provided by the patient. Review of Systems   Constitutional:  Negative for fever. HENT:  Negative for congestion. Eyes:  Negative for visual disturbance. Respiratory:  Negative for cough and shortness of breath. Cardiovascular:  Negative for chest pain. Gastrointestinal:  Positive for abdominal pain. Endocrine: Negative for polyuria. Genitourinary:  Negative for dysuria. Musculoskeletal:  Negative for back pain. Skin:  Negative for rash. Allergic/Immunologic: Positive for immunocompromised state. Neurological:  Negative for headaches. Hematological:  Does not bruise/bleed easily. Psychiatric/Behavioral:  Negative for confusion. Physical Exam  Vitals and nursing note reviewed. Constitutional:       General: She is not in acute distress. Appearance: She is well-developed. HENT:      Head: Normocephalic and atraumatic. Mouth/Throat:      Mouth: Mucous membranes are dry. Eyes:      Extraocular Movements: Extraocular movements intact. Neck:      Vascular: No JVD. Cardiovascular:      Rate and Rhythm: Normal rate and regular rhythm.    Pulmonary:      Effort: Pulmonary effort is normal.   Abdominal:

## 2023-04-27 LAB
TESTOSTERONE FREE (CHAN): 1.4 PG/ML (ref 0.1–6.4)
TESTOSTERONE,TOTAL,LC-MS/MS: 7 NG/DL (ref 2–45)

## 2023-04-28 DIAGNOSIS — G93.2 BENIGN INTRACRANIAL HYPERTENSION: Primary | ICD-10-CM

## 2023-04-28 RX ORDER — FUROSEMIDE 20 MG/1
20 TABLET ORAL 2 TIMES DAILY
Qty: 180 TABLET | Refills: 3 | Status: SHIPPED | OUTPATIENT
Start: 2023-04-28 | End: 2024-01-09 | Stop reason: SDUPTHER

## 2023-05-05 ENCOUNTER — PATIENT OUTREACH (OUTPATIENT)
Dept: PRIMARY CARE | Facility: CLINIC | Age: 48
End: 2023-05-05
Payer: MEDICAID

## 2023-05-05 ENCOUNTER — DOCUMENTATION (OUTPATIENT)
Dept: PRIMARY CARE | Facility: CLINIC | Age: 48
End: 2023-05-05
Payer: MEDICAID

## 2023-05-05 DIAGNOSIS — K22.2 ESOPHAGEAL STRICTURE: ICD-10-CM

## 2023-05-05 NOTE — PROGRESS NOTES
Discharge Facility: [facility]  Discharge Diagnosis: Esophageal stricture, EGD  Admission Date: 5/2/23      Discharge Date: 5/4/23    PCP Appointment Date:  TBD  Specialist Appointment Date: BETI Dobbins 5/12/23  Hospital Encounter and Summary: Linked  See discharge assessment below for further details    Engagement  Call Start Time: 1153 (5/5/2023 11:53 AM)    Medications  Medications reviewed with patient/caregiver?: Not applicable (5/5/2023 11:53 AM)  Is the patient having any side effects they believe may be caused by any medication additions or changes?: No (5/5/2023 11:53 AM)  Does the patient have all medications ordered at discharge?: Not applicable (5/5/2023 11:53 AM)  Is the patient taking all medications as directed (includes completed medication regime)?: Yes (5/5/2023 11:53 AM)  Medication Comments: see med list, No new meds at discharge (5/5/2023 11:53 AM)    Appointments  Does the patient have a primary care provider?: Yes (5/5/2023 11:53 AM)  Care Management Interventions: Advised patient to make appointment (No appts available, task to office) (5/5/2023 11:53 AM)  Has the patient kept scheduled appointments due by today?: Yes (5/5/2023 11:53 AM)    Self Management  Has home health visited the patient within 72 hours of discharge?: Not applicable (5/5/2023 11:53 AM)    Patient Teaching  Does the patient have access to their discharge instructions?: Yes (5/5/2023 11:53 AM)  Care Management Interventions: Reviewed instructions with patient (5/5/2023 11:53 AM)  What is the patient's perception of their health status since discharge?: Same (5/5/2023 11:53 AM)

## 2023-05-18 ENCOUNTER — PATIENT OUTREACH (OUTPATIENT)
Dept: PRIMARY CARE | Facility: CLINIC | Age: 48
End: 2023-05-18
Payer: MEDICAID

## 2023-05-18 NOTE — PROGRESS NOTES
Call following hospitalization on 5/4/23.  At time of outreach call the patient feels as if their condition has improved since last outreach call. All questions or concerns were addressed.

## 2023-06-04 NOTE — ASSESSMENT & PLAN NOTE
Continue her DuoNebs 4 times daily and Advair Diskus
Follow-up with  neuro-ophthalmologist at [facility]
She is to continue her Lantus and her NovoLog sliding scale on the direction of her endocrinologist.  She will keep close follow-up with her
Jeremy

## 2023-06-05 ENCOUNTER — PATIENT OUTREACH (OUTPATIENT)
Dept: PRIMARY CARE | Facility: CLINIC | Age: 48
End: 2023-06-05
Payer: MEDICAID

## 2023-06-05 NOTE — PROGRESS NOTES
Call placed regarding one month post discharge follow up call.  At time of outreach call the patient feels as if their condition has returned to baseline since initial visit with PCP or specialist.  Questions or concerns regarding recovery period addressed at this time.   Reviewed any PCP or specialists progress notes/labs/radiology reports if applicable and addressed any questions or concerns.   Britney Phipps LPN

## 2023-06-13 LAB
ALANINE AMINOTRANSFERASE (SGPT) (U/L) IN SER/PLAS: 22 U/L (ref 7–45)
ALBUMIN (G/DL) IN SER/PLAS: 4.4 G/DL (ref 3.4–5)
ALKALINE PHOSPHATASE (U/L) IN SER/PLAS: 91 U/L (ref 33–110)
ANION GAP IN SER/PLAS: 13 MMOL/L (ref 10–20)
ASPARTATE AMINOTRANSFERASE (SGOT) (U/L) IN SER/PLAS: 20 U/L (ref 9–39)
BASOPHILS (10*3/UL) IN BLOOD BY AUTOMATED COUNT: 0.02 X10E9/L (ref 0–0.1)
BASOPHILS/100 LEUKOCYTES IN BLOOD BY AUTOMATED COUNT: 0.3 % (ref 0–2)
BILIRUBIN TOTAL (MG/DL) IN SER/PLAS: 0.3 MG/DL (ref 0–1.2)
CALCIUM (MG/DL) IN SER/PLAS: 10 MG/DL (ref 8.6–10.6)
CARBON DIOXIDE, TOTAL (MMOL/L) IN SER/PLAS: 31 MMOL/L (ref 21–32)
CHLORIDE (MMOL/L) IN SER/PLAS: 102 MMOL/L (ref 98–107)
CREATININE (MG/DL) IN SER/PLAS: 0.85 MG/DL (ref 0.5–1.05)
DEAMIDATED GLIADIN PEPTIDE IGA: <1 U/ML (ref 0–14)
DEAMIDATED GLIADIN PEPTIDE IGG: 2 U/ML (ref 0–14)
EOSINOPHILS (10*3/UL) IN BLOOD BY AUTOMATED COUNT: 0.04 X10E9/L (ref 0–0.7)
EOSINOPHILS/100 LEUKOCYTES IN BLOOD BY AUTOMATED COUNT: 0.6 % (ref 0–6)
ERYTHROCYTE DISTRIBUTION WIDTH (RATIO) BY AUTOMATED COUNT: 14.2 % (ref 11.5–14.5)
ERYTHROCYTE MEAN CORPUSCULAR HEMOGLOBIN CONCENTRATION (G/DL) BY AUTOMATED: 30.7 G/DL (ref 32–36)
ERYTHROCYTE MEAN CORPUSCULAR VOLUME (FL) BY AUTOMATED COUNT: 90 FL (ref 80–100)
ERYTHROCYTES (10*6/UL) IN BLOOD BY AUTOMATED COUNT: 4.98 X10E12/L (ref 4–5.2)
GFR FEMALE: 86 ML/MIN/1.73M2
GLUCOSE (MG/DL) IN SER/PLAS: 109 MG/DL (ref 74–99)
HEMATOCRIT (%) IN BLOOD BY AUTOMATED COUNT: 44.9 % (ref 36–46)
HEMOGLOBIN (G/DL) IN BLOOD: 13.8 G/DL (ref 12–16)
IGA (MG/DL) IN SER/PLAS: 121 MG/DL (ref 70–400)
IMMATURE GRANULOCYTES/100 LEUKOCYTES IN BLOOD BY AUTOMATED COUNT: 0.5 % (ref 0–0.9)
LEUKOCYTES (10*3/UL) IN BLOOD BY AUTOMATED COUNT: 6.3 X10E9/L (ref 4.4–11.3)
LYMPHOCYTES (10*3/UL) IN BLOOD BY AUTOMATED COUNT: 1.53 X10E9/L (ref 1.2–4.8)
LYMPHOCYTES/100 LEUKOCYTES IN BLOOD BY AUTOMATED COUNT: 24.3 % (ref 13–44)
MONOCYTES (10*3/UL) IN BLOOD BY AUTOMATED COUNT: 0.41 X10E9/L (ref 0.1–1)
MONOCYTES/100 LEUKOCYTES IN BLOOD BY AUTOMATED COUNT: 6.5 % (ref 2–10)
NEUTROPHILS (10*3/UL) IN BLOOD BY AUTOMATED COUNT: 4.27 X10E9/L (ref 1.2–7.7)
NEUTROPHILS/100 LEUKOCYTES IN BLOOD BY AUTOMATED COUNT: 67.8 % (ref 40–80)
NRBC (PER 100 WBCS) BY AUTOMATED COUNT: 0 /100 WBC (ref 0–0)
PLATELETS (10*3/UL) IN BLOOD AUTOMATED COUNT: 290 X10E9/L (ref 150–450)
POTASSIUM (MMOL/L) IN SER/PLAS: 4.5 MMOL/L (ref 3.5–5.3)
PROTEIN TOTAL: 7.8 G/DL (ref 6.4–8.2)
SODIUM (MMOL/L) IN SER/PLAS: 141 MMOL/L (ref 136–145)
TISSUE TRANSGLUTAMINASE IGG: <1 U/ML (ref 0–14)
TISSUE TRANSGLUTAMINASE, IGA: <1 U/ML (ref 0–14)
UREA NITROGEN (MG/DL) IN SER/PLAS: 17 MG/DL (ref 6–23)

## 2023-06-14 ENCOUNTER — APPOINTMENT (OUTPATIENT)
Dept: CT IMAGING | Age: 48
End: 2023-06-14
Payer: MEDICAID

## 2023-06-14 ENCOUNTER — HOSPITAL ENCOUNTER (EMERGENCY)
Age: 48
Discharge: HOME | End: 2023-06-14
Attending: EMERGENCY MEDICINE
Payer: MEDICAID

## 2023-06-14 VITALS
WEIGHT: 240 LBS | SYSTOLIC BLOOD PRESSURE: 140 MMHG | BODY MASS INDEX: 44.16 KG/M2 | HEART RATE: 97 BPM | TEMPERATURE: 97.9 F | RESPIRATION RATE: 16 BRPM | HEIGHT: 62 IN | OXYGEN SATURATION: 97 % | DIASTOLIC BLOOD PRESSURE: 85 MMHG

## 2023-06-14 DIAGNOSIS — R11.2 NAUSEA VOMITING AND DIARRHEA: Primary | ICD-10-CM

## 2023-06-14 DIAGNOSIS — K92.1 HEMATOCHEZIA: ICD-10-CM

## 2023-06-14 DIAGNOSIS — R19.7 NAUSEA VOMITING AND DIARRHEA: Primary | ICD-10-CM

## 2023-06-14 LAB
ALBUMIN SERPL-MCNC: 4.1 G/DL (ref 3.5–5.2)
ALP SERPL-CCNC: 108 U/L (ref 35–104)
ALT SERPL-CCNC: 45 U/L (ref 0–32)
ANION GAP SERPL CALCULATED.3IONS-SCNC: 9 MMOL/L (ref 7–16)
AST SERPL-CCNC: 30 U/L (ref 0–31)
BASOPHILS # BLD: 0.01 E9/L (ref 0–0.2)
BASOPHILS NFR BLD: 0.2 % (ref 0–2)
BILIRUB SERPL-MCNC: 0.3 MG/DL (ref 0–1.2)
BILIRUB UR QL STRIP: NEGATIVE
BUN SERPL-MCNC: 14 MG/DL (ref 6–20)
CALCIUM SERPL-MCNC: 9.3 MG/DL (ref 8.6–10.2)
CHLORIDE SERPL-SCNC: 103 MMOL/L (ref 98–107)
CLARITY UR: CLEAR
CO2 SERPL-SCNC: 28 MMOL/L (ref 22–29)
COLOR UR: YELLOW
CREAT SERPL-MCNC: 0.8 MG/DL (ref 0.5–1)
EOSINOPHIL # BLD: 0.11 E9/L (ref 0.05–0.5)
EOSINOPHIL NFR BLD: 2.3 % (ref 0–6)
ERYTHROCYTE [DISTWIDTH] IN BLOOD BY AUTOMATED COUNT: 14.6 FL (ref 11.5–15)
GLUCOSE SERPL-MCNC: 103 MG/DL (ref 74–99)
GLUCOSE UR STRIP-MCNC: 500 MG/DL
HCT VFR BLD AUTO: 42.1 % (ref 34–48)
HGB BLD-MCNC: 13.5 G/DL (ref 11.5–15.5)
HGB UR QL STRIP: NEGATIVE
IMM GRANULOCYTES # BLD: 0.01 E9/L
IMM GRANULOCYTES NFR BLD: 0.2 % (ref 0–5)
INFLUENZA A: NOT DETECTED
INFLUENZA B: NOT DETECTED
KETONES UR STRIP-MCNC: NEGATIVE MG/DL
LACTATE BLDV-SCNC: 1 MMOL/L (ref 0.5–2.2)
LEUKOCYTE ESTERASE UR QL STRIP: NEGATIVE
LIPASE: 36 U/L (ref 13–60)
LYMPHOCYTES # BLD: 1.63 E9/L (ref 1.5–4)
LYMPHOCYTES NFR BLD: 34.5 % (ref 20–42)
MCH RBC QN AUTO: 28.8 PG (ref 26–35)
MCHC RBC AUTO-ENTMCNC: 32.1 % (ref 32–34.5)
MCV RBC AUTO: 90 FL (ref 80–99.9)
MONOCYTES # BLD: 0.34 E9/L (ref 0.1–0.95)
MONOCYTES NFR BLD: 7.2 % (ref 2–12)
NEUTROPHILS # BLD: 2.62 E9/L (ref 1.8–7.3)
NEUTS SEG NFR BLD: 55.6 % (ref 43–80)
NITRITE UR QL STRIP: NEGATIVE
PH UR STRIP: 7.5 [PH] (ref 5–9)
PLATELET # BLD AUTO: 246 E9/L (ref 130–450)
PMV BLD AUTO: 9.8 FL (ref 7–12)
POTASSIUM SERPL-SCNC: 4.3 MMOL/L (ref 3.5–5)
PROT SERPL-MCNC: 7.6 G/DL (ref 6.4–8.3)
PROT UR STRIP-MCNC: NEGATIVE MG/DL
RBC # BLD AUTO: 4.68 E12/L (ref 3.5–5.5)
SARS-COV-2 RNA, RT PCR: NOT DETECTED
SODIUM SERPL-SCNC: 140 MMOL/L (ref 132–146)
SP GR UR STRIP: 1.01 (ref 1–1.03)
UROBILINOGEN UR STRIP-ACNC: 0.2 E.U./DL
WBC # BLD: 4.7 E9/L (ref 4.5–11.5)

## 2023-06-14 PROCEDURE — A4216 STERILE WATER/SALINE, 10 ML: HCPCS | Performed by: EMERGENCY MEDICINE

## 2023-06-14 PROCEDURE — 74176 CT ABD & PELVIS W/O CONTRAST: CPT

## 2023-06-14 PROCEDURE — 96375 TX/PRO/DX INJ NEW DRUG ADDON: CPT

## 2023-06-14 PROCEDURE — 87636 SARSCOV2 & INF A&B AMP PRB: CPT

## 2023-06-14 PROCEDURE — 36415 COLL VENOUS BLD VENIPUNCTURE: CPT

## 2023-06-14 PROCEDURE — 2580000003 HC RX 258: Performed by: EMERGENCY MEDICINE

## 2023-06-14 PROCEDURE — 99284 EMERGENCY DEPT VISIT MOD MDM: CPT

## 2023-06-14 PROCEDURE — 6360000002 HC RX W HCPCS: Performed by: EMERGENCY MEDICINE

## 2023-06-14 PROCEDURE — 96374 THER/PROPH/DIAG INJ IV PUSH: CPT

## 2023-06-14 PROCEDURE — C9113 INJ PANTOPRAZOLE SODIUM, VIA: HCPCS | Performed by: EMERGENCY MEDICINE

## 2023-06-14 PROCEDURE — 83605 ASSAY OF LACTIC ACID: CPT

## 2023-06-14 PROCEDURE — 80053 COMPREHEN METABOLIC PANEL: CPT

## 2023-06-14 PROCEDURE — 81003 URINALYSIS AUTO W/O SCOPE: CPT

## 2023-06-14 PROCEDURE — 85025 COMPLETE CBC W/AUTO DIFF WBC: CPT

## 2023-06-14 PROCEDURE — 83690 ASSAY OF LIPASE: CPT

## 2023-06-14 RX ORDER — LORAZEPAM 2 MG/ML
1 INJECTION INTRAMUSCULAR ONCE
Status: COMPLETED | OUTPATIENT
Start: 2023-06-14 | End: 2023-06-14

## 2023-06-14 RX ORDER — HYDROCORTISONE 10 MG/1
10 TABLET ORAL 2 TIMES DAILY
COMMUNITY

## 2023-06-14 RX ORDER — 0.9 % SODIUM CHLORIDE 0.9 %
1000 INTRAVENOUS SOLUTION INTRAVENOUS ONCE
Status: COMPLETED | OUTPATIENT
Start: 2023-06-14 | End: 2023-06-14

## 2023-06-14 RX ORDER — DOXYCYCLINE HYCLATE 50 MG/1
324 CAPSULE, GELATIN COATED ORAL
COMMUNITY

## 2023-06-14 RX ORDER — UBROGEPANT 100 MG/1
TABLET ORAL
COMMUNITY

## 2023-06-14 RX ORDER — DICYCLOMINE HYDROCHLORIDE 10 MG/1
20 CAPSULE ORAL EVERY 6 HOURS PRN
Qty: 30 CAPSULE | Refills: 0 | Status: SHIPPED | OUTPATIENT
Start: 2023-06-14

## 2023-06-14 RX ORDER — 0.9 % SODIUM CHLORIDE 0.9 %
2000 INTRAVENOUS SOLUTION INTRAVENOUS ONCE
Status: DISCONTINUED | OUTPATIENT
Start: 2023-06-14 | End: 2023-06-14

## 2023-06-14 RX ORDER — ONDANSETRON 4 MG/1
8 TABLET, ORALLY DISINTEGRATING ORAL EVERY 8 HOURS PRN
Qty: 20 TABLET | Refills: 0 | Status: SHIPPED | OUTPATIENT
Start: 2023-06-14

## 2023-06-14 RX ORDER — ATOGEPANT 60 MG/1
TABLET ORAL
COMMUNITY

## 2023-06-14 RX ADMIN — SODIUM CHLORIDE 1000 ML: 9 INJECTION, SOLUTION INTRAVENOUS at 21:12

## 2023-06-14 RX ADMIN — SODIUM CHLORIDE 40 MG: 9 INJECTION INTRAMUSCULAR; INTRAVENOUS; SUBCUTANEOUS at 21:48

## 2023-06-14 RX ADMIN — LORAZEPAM 1 MG: 2 INJECTION INTRAMUSCULAR; INTRAVENOUS at 21:47

## 2023-06-14 RX ADMIN — WATER 100 MG: 1 INJECTION INTRAMUSCULAR; INTRAVENOUS; SUBCUTANEOUS at 21:51

## 2023-06-14 ASSESSMENT — PAIN DESCRIPTION - LOCATION: LOCATION: ABDOMEN

## 2023-06-14 ASSESSMENT — PAIN DESCRIPTION - FREQUENCY: FREQUENCY: CONTINUOUS

## 2023-06-14 ASSESSMENT — PAIN DESCRIPTION - ORIENTATION: ORIENTATION: MID

## 2023-06-14 ASSESSMENT — PAIN DESCRIPTION - PAIN TYPE: TYPE: ACUTE PAIN

## 2023-06-14 ASSESSMENT — PAIN DESCRIPTION - DESCRIPTORS: DESCRIPTORS: ACHING;DISCOMFORT

## 2023-06-14 ASSESSMENT — PAIN - FUNCTIONAL ASSESSMENT: PAIN_FUNCTIONAL_ASSESSMENT: 0-10

## 2023-06-14 ASSESSMENT — PAIN SCALES - GENERAL: PAINLEVEL_OUTOF10: 4

## 2023-06-14 ASSESSMENT — PAIN DESCRIPTION - ONSET: ONSET: SUDDEN

## 2023-06-15 NOTE — DISCHARGE INSTRUCTIONS
NOTE:  Get immediate medical attention if any new/worsening symptoms occur. Make sure to contact your primary care physician tomorrow to let him know your progress. You may need referral to your gastroenterologist for further evaluation.

## 2023-06-15 NOTE — ED PROVIDER NOTES
HPI:  6/14/23, Time: 10:53 PM EDT        Gene Ruth is a 41 y.o. female presenting to the ED for nausea vomiting plus diarrhea, beginning 1 day ago ago. The complaint has been persistent, moderate in severity, and worsened by nothing. No fever/chills, no hematemesis, no melena, the patient states she has had some intermittent hematochezia. Abdominal pain is dull achy discomfort mid abdomen. No relieving factors reported. No other complaints. No radiation of pain to the back. Review of Systems:   A complete review of systems was performed and pertinent positives and negatives are stated within HPI, all other systems reviewed and are negative.    --------------------------------------------- PAST HISTORY ---------------------------------------------  Past Medical History:  has a past medical history of Abnormal Pap smear of cervix, Anemia, Anxiety, Asthma, Blind, Connective tissue disease ([address] Cyst of right breast, DDD (degenerative disc disease), cervical, Depression, Diabetes mellitus ([address] Dysphagia, Esophagus disorder, Fibromyalgia, Gastroparesis, GERD (gastroesophageal reflux disease), Headache, Hematuria, Hyperlipidemia, Hypertension, Hypothyroidism, IBS (irritable bowel syndrome), Immune deficiency disorder ([address] Insomnia, Kidney stones, Meningitis, Meningitis, PAULINE on CPAP, PCOS (polycystic ovarian syndrome), POTS (postural orthostatic tachycardia syndrome), Problems with swallowing, Pseudotumor cerebri, Raynaud's disease, Rectal bleeding, RLS (restless legs syndrome), Scleroderma ([address] Sjogren's disease ([address] Sudden adrenal gland insufficiency ([address] Thyroid nodule, and TIA (transient ischemic attack). Past Surgical History:  has a past surgical history that includes Hysterectomy; Cholecystectomy; Appendectomy; Carpal tunnel release (Bilateral); Port Surgery; and Pyloroplasty. Social History:  reports that she has never smoked.  She has never used smokeless tobacco. She reports that

## 2023-06-20 LAB
MISCELLANEUOUS TEST RESULT: NORMAL
NAME OF SENDOUT TEST: NORMAL

## 2023-06-24 ENCOUNTER — HOSPITAL ENCOUNTER (EMERGENCY)
Age: 48
Discharge: HOME | End: 2023-06-24
Attending: EMERGENCY MEDICINE
Payer: MEDICAID

## 2023-06-24 ENCOUNTER — APPOINTMENT (OUTPATIENT)
Dept: GENERAL RADIOLOGY | Age: 48
End: 2023-06-24
Payer: MEDICAID

## 2023-06-24 VITALS
TEMPERATURE: 98.4 F | RESPIRATION RATE: 16 BRPM | BODY MASS INDEX: 44.16 KG/M2 | HEIGHT: 62 IN | WEIGHT: 240 LBS | SYSTOLIC BLOOD PRESSURE: 150 MMHG | HEART RATE: 97 BPM | DIASTOLIC BLOOD PRESSURE: 108 MMHG | OXYGEN SATURATION: 100 %

## 2023-06-24 DIAGNOSIS — J02.9 VIRAL PHARYNGITIS: Primary | ICD-10-CM

## 2023-06-24 LAB
ALBUMIN SERPL-MCNC: 3.7 G/DL (ref 3.5–5.2)
ALP SERPL-CCNC: 110 U/L (ref 35–104)
ALT SERPL-CCNC: 15 U/L (ref 0–32)
ANION GAP SERPL CALCULATED.3IONS-SCNC: 9 MMOL/L (ref 7–16)
AST SERPL-CCNC: 19 U/L (ref 0–31)
BACTERIA URNS QL MICRO: ABNORMAL /HPF
BASOPHILS # BLD: 0.04 E9/L (ref 0–0.2)
BASOPHILS NFR BLD: 0.5 % (ref 0–2)
BILIRUB SERPL-MCNC: 0.3 MG/DL (ref 0–1.2)
BILIRUB UR QL STRIP: NEGATIVE
BUN SERPL-MCNC: 21 MG/DL (ref 6–20)
CALCIUM SERPL-MCNC: 9.5 MG/DL (ref 8.6–10.2)
CHLORIDE SERPL-SCNC: 102 MMOL/L (ref 98–107)
CLARITY UR: CLEAR
CO2 SERPL-SCNC: 24 MMOL/L (ref 22–29)
COLOR UR: YELLOW
CORTIS SERPL-MCNC: 4.2 MCG/DL (ref 2.68–18.4)
CREAT SERPL-MCNC: 0.8 MG/DL (ref 0.5–1)
EOSINOPHIL # BLD: 0.14 E9/L (ref 0.05–0.5)
EOSINOPHIL NFR BLD: 1.8 % (ref 0–6)
EPI CELLS #/AREA URNS HPF: ABNORMAL /HPF
ERYTHROCYTE [DISTWIDTH] IN BLOOD BY AUTOMATED COUNT: 14.6 FL (ref 11.5–15)
GLUCOSE SERPL-MCNC: 138 MG/DL (ref 74–99)
GLUCOSE UR STRIP-MCNC: >=1000 MG/DL
HCT VFR BLD AUTO: 40.5 % (ref 34–48)
HGB BLD-MCNC: 12.9 G/DL (ref 11.5–15.5)
HGB UR QL STRIP: NEGATIVE
IMM GRANULOCYTES # BLD: 0.03 E9/L
IMM GRANULOCYTES NFR BLD: 0.4 % (ref 0–5)
INFLUENZA A BY PCR: NOT DETECTED
INFLUENZA B BY PCR: NOT DETECTED
KETONES UR STRIP-MCNC: ABNORMAL MG/DL
LEUKOCYTE ESTERASE UR QL STRIP: NEGATIVE
LIPASE: 43 U/L (ref 13–60)
LYMPHOCYTES # BLD: 1.42 E9/L (ref 1.5–4)
LYMPHOCYTES NFR BLD: 18.5 % (ref 20–42)
MAGNESIUM SERPL-MCNC: 2.1 MG/DL (ref 1.6–2.6)
MCH RBC QN AUTO: 28.6 PG (ref 26–35)
MCHC RBC AUTO-ENTMCNC: 31.9 % (ref 32–34.5)
MCV RBC AUTO: 89.8 FL (ref 80–99.9)
MONOCYTES # BLD: 0.45 E9/L (ref 0.1–0.95)
MONOCYTES NFR BLD: 5.9 % (ref 2–12)
NEUTROPHILS # BLD: 5.6 E9/L (ref 1.8–7.3)
NEUTS SEG NFR BLD: 72.9 % (ref 43–80)
NITRITE UR QL STRIP: NEGATIVE
PH UR STRIP: 6.5 [PH] (ref 5–9)
PLATELET # BLD AUTO: 247 E9/L (ref 130–450)
PMV BLD AUTO: 9.7 FL (ref 7–12)
POTASSIUM SERPL-SCNC: 4.3 MMOL/L (ref 3.5–5)
PROT SERPL-MCNC: 7.1 G/DL (ref 6.4–8.3)
PROT UR STRIP-MCNC: NEGATIVE MG/DL
RBC # BLD AUTO: 4.51 E12/L (ref 3.5–5.5)
RBC #/AREA URNS HPF: ABNORMAL /HPF (ref 0–2)
SARS-COV-2 RDRP RESP QL NAA+PROBE: NOT DETECTED
SODIUM SERPL-SCNC: 135 MMOL/L (ref 132–146)
SP GR UR STRIP: 1.02 (ref 1–1.03)
STREP GRP A PCR: NEGATIVE
UROBILINOGEN UR STRIP-ACNC: 0.2 E.U./DL
WBC # BLD: 7.7 E9/L (ref 4.5–11.5)
WBC #/AREA URNS HPF: ABNORMAL /HPF (ref 0–5)

## 2023-06-24 PROCEDURE — 87635 SARS-COV-2 COVID-19 AMP PRB: CPT

## 2023-06-24 PROCEDURE — 71045 X-RAY EXAM CHEST 1 VIEW: CPT

## 2023-06-24 PROCEDURE — 82533 TOTAL CORTISOL: CPT

## 2023-06-24 PROCEDURE — 83735 ASSAY OF MAGNESIUM: CPT

## 2023-06-24 PROCEDURE — 6360000002 HC RX W HCPCS: Performed by: EMERGENCY MEDICINE

## 2023-06-24 PROCEDURE — 87502 INFLUENZA DNA AMP PROBE: CPT

## 2023-06-24 PROCEDURE — 80053 COMPREHEN METABOLIC PANEL: CPT

## 2023-06-24 PROCEDURE — 99284 EMERGENCY DEPT VISIT MOD MDM: CPT

## 2023-06-24 PROCEDURE — 96375 TX/PRO/DX INJ NEW DRUG ADDON: CPT

## 2023-06-24 PROCEDURE — 85025 COMPLETE CBC W/AUTO DIFF WBC: CPT

## 2023-06-24 PROCEDURE — 87880 STREP A ASSAY W/OPTIC: CPT

## 2023-06-24 PROCEDURE — 81001 URINALYSIS AUTO W/SCOPE: CPT

## 2023-06-24 PROCEDURE — 83690 ASSAY OF LIPASE: CPT

## 2023-06-24 PROCEDURE — 2580000003 HC RX 258: Performed by: EMERGENCY MEDICINE

## 2023-06-24 PROCEDURE — 96374 THER/PROPH/DIAG INJ IV PUSH: CPT

## 2023-06-24 RX ORDER — KETOROLAC TROMETHAMINE 30 MG/ML
15 INJECTION, SOLUTION INTRAMUSCULAR; INTRAVENOUS ONCE
Status: COMPLETED | OUTPATIENT
Start: 2023-06-24 | End: 2023-06-24

## 2023-06-24 RX ORDER — ONDANSETRON 2 MG/ML
4 INJECTION INTRAMUSCULAR; INTRAVENOUS ONCE
Status: COMPLETED | OUTPATIENT
Start: 2023-06-24 | End: 2023-06-24

## 2023-06-24 RX ORDER — LIDOCAINE HYDROCHLORIDE 20 MG/ML
15 SOLUTION OROPHARYNGEAL ONCE
Status: DISCONTINUED | OUTPATIENT
Start: 2023-06-24 | End: 2023-06-24 | Stop reason: HOSPADM

## 2023-06-24 RX ORDER — LIDOCAINE HYDROCHLORIDE 20 MG/ML
5 SOLUTION OROPHARYNGEAL PRN
Qty: 100 ML | Refills: 0 | Status: SHIPPED | OUTPATIENT
Start: 2023-06-24

## 2023-06-24 RX ORDER — HYDROCORTISONE 20 MG/1
20 TABLET ORAL 2 TIMES DAILY
Qty: 6 TABLET | Refills: 0 | Status: SHIPPED | OUTPATIENT
Start: 2023-06-24 | End: 2023-06-27

## 2023-06-24 RX ORDER — 0.9 % SODIUM CHLORIDE 0.9 %
1000 INTRAVENOUS SOLUTION INTRAVENOUS ONCE
Status: COMPLETED | OUTPATIENT
Start: 2023-06-24 | End: 2023-06-24

## 2023-06-24 RX ORDER — HEPARIN SODIUM 100 [USP'U]/ML
INJECTION, SOLUTION INTRAVENOUS
Status: DISCONTINUED
Start: 2023-06-24 | End: 2023-06-24 | Stop reason: HOSPADM

## 2023-06-24 RX ADMIN — SODIUM CHLORIDE 1000 ML: 9 INJECTION, SOLUTION INTRAVENOUS at 06:39

## 2023-06-24 RX ADMIN — ONDANSETRON 4 MG: 2 INJECTION INTRAMUSCULAR; INTRAVENOUS at 06:41

## 2023-06-24 RX ADMIN — KETOROLAC TROMETHAMINE 15 MG: 30 INJECTION, SOLUTION INTRAMUSCULAR; INTRAVENOUS at 06:41

## 2023-06-24 ASSESSMENT — PAIN DESCRIPTION - LOCATION: LOCATION: BACK;THROAT

## 2023-06-24 ASSESSMENT — PAIN SCALES - GENERAL: PAINLEVEL_OUTOF10: 6

## 2023-06-24 ASSESSMENT — PAIN - FUNCTIONAL ASSESSMENT: PAIN_FUNCTIONAL_ASSESSMENT: 0-10

## 2023-06-24 NOTE — ED NOTES
Assumed care of patient from Dr. Michael Devine. Please refer to Dr. Olivia Rolle note for full history and physical.  Presented with sore throat for 2 days. Workup ordered included CBC, CMP, flu, COVID, urinalysis, lipase and strep screen. Also ordered total cortisol level. Chest x-ray. Labs independently interpreted by me. WBC is 7.7.,  No significant abnormalities on chemistry, urinalysis shows no signs of infection. COVID, flu and rapid strep are negative. Chest x-ray shows no signs of acute cardiopulmonary disease. Course under my care:  Patient is finishing her bolus of saline upon my evaluation. She is asking for something for her throat. I will give her a dose of viscous lidocaine and prescribe this outpatient as well. The plan discussed with the off going provider was for the patient to receive her increased/stress dose Solu-Cortef prescription and if no significant abnormalities on work-up noted, she can be discharged. I discussed the findings with the patient. She has no hyper- or hyponatremia, no hypo or hyper kalemia. The patient is fine with the plan for the viscous lidocaine and stress dose steroids. Patient to be discharged to follow-up outpatient.     Nino Lutz MD  7:56 AM  6/24/2023         Campbell Nicholson MD  Resident  06/24/23 [id number]

## 2023-06-24 NOTE — ED PROVIDER NOTES
57-year-old female with history of IgG deficiency who gets immunoglobulin infusions every 2 weeks. She also has history of adrenal insufficiency who is on daily hydrocortisone 10 mg. She states that for her stress dose she increases it to hydrocortisone 40 mg for 3 days daily. She has not increased her cortisone dose since 1 month ago. She comes emergency department today for concerns of pharyngitis. She started having sore throat 2 days ago. The sore throat is accompanied by fevers generalized body aches and chills. She says she started having back pain in the upper thoracic area describes as a achy pain like her muscle aches in her extremities. She denies any trauma. She takes Tylenol for the fevers. Last dose being at 2 in the morning. She denies the following: Chest pain, cough, sinus pressure, headache, abdominal pain, vomiting, urinary symptoms or GI bleed. Patient does states she is nauseated and that does not alleviate with Zofran. Chief Complaint   Patient presents with    Pharyngitis    Back Pain     Middle  back pain starting a half hour ago       Review of Systems   Pertinent stated in HPI above  Physical Exam  Vitals reviewed. Constitutional:       General: She is not in acute distress. Appearance: She is not ill-appearing. HENT:      Head: Normocephalic. Right Ear: External ear normal.      Left Ear: External ear normal.      Nose: Nose normal.      Mouth/Throat:      Mouth: Mucous membranes are moist.      Pharynx: Uvula midline. No pharyngeal swelling, oropharyngeal exudate, posterior oropharyngeal erythema or uvula swelling. Tonsils: No tonsillar exudate or tonsillar abscesses. Eyes:      General:         Right eye: No discharge. Left eye: No discharge. Conjunctiva/sclera: Conjunctivae normal.   Cardiovascular:      Rate and Rhythm: Normal rate and regular rhythm. Heart sounds: No friction rub. No gallop.    Pulmonary:      Effort: No

## 2023-06-26 ENCOUNTER — HOSPITAL ENCOUNTER (EMERGENCY)
Age: 48
Discharge: HOME | End: 2023-06-26
Payer: MEDICAID

## 2023-06-26 ENCOUNTER — APPOINTMENT (OUTPATIENT)
Dept: CT IMAGING | Age: 48
End: 2023-06-26
Payer: MEDICAID

## 2023-06-26 ENCOUNTER — APPOINTMENT (OUTPATIENT)
Dept: GENERAL RADIOLOGY | Age: 48
End: 2023-06-26
Payer: MEDICAID

## 2023-06-26 VITALS
HEART RATE: 93 BPM | DIASTOLIC BLOOD PRESSURE: 96 MMHG | TEMPERATURE: 98.1 F | OXYGEN SATURATION: 98 % | SYSTOLIC BLOOD PRESSURE: 137 MMHG | RESPIRATION RATE: 16 BRPM

## 2023-06-26 DIAGNOSIS — N89.8 VAGINAL DISCHARGE: ICD-10-CM

## 2023-06-26 DIAGNOSIS — R10.30 LOWER ABDOMINAL PAIN: ICD-10-CM

## 2023-06-26 DIAGNOSIS — N30.00 ACUTE CYSTITIS WITHOUT HEMATURIA: Primary | ICD-10-CM

## 2023-06-26 LAB
ALBUMIN SERPL-MCNC: 4.4 G/DL (ref 3.5–5.2)
ALP SERPL-CCNC: 126 U/L (ref 35–104)
ALT SERPL-CCNC: 22 U/L (ref 0–32)
ANION GAP SERPL CALCULATED.3IONS-SCNC: 12 MMOL/L (ref 7–16)
AST SERPL-CCNC: 21 U/L (ref 0–31)
BACTERIA URNS QL MICRO: ABNORMAL /HPF
BASOPHILS # BLD: 0.04 E9/L (ref 0–0.2)
BASOPHILS NFR BLD: 0.5 % (ref 0–2)
BILIRUB SERPL-MCNC: <0.2 MG/DL (ref 0–1.2)
BILIRUB UR QL STRIP: NEGATIVE
BUN SERPL-MCNC: 15 MG/DL (ref 6–20)
CALCIUM SERPL-MCNC: 9.4 MG/DL (ref 8.6–10.2)
CHLORIDE SERPL-SCNC: 100 MMOL/L (ref 98–107)
CLARITY UR: ABNORMAL
CLUE CELLS VAG QL WET PREP: NORMAL
CO2 SERPL-SCNC: 29 MMOL/L (ref 22–29)
COLOR UR: YELLOW
CREAT SERPL-MCNC: 1 MG/DL (ref 0.5–1)
EOSINOPHIL # BLD: 0.12 E9/L (ref 0.05–0.5)
EOSINOPHIL NFR BLD: 1.4 % (ref 0–6)
EPI CELLS #/AREA URNS HPF: ABNORMAL /HPF
ERYTHROCYTE [DISTWIDTH] IN BLOOD BY AUTOMATED COUNT: 14.4 FL (ref 11.5–15)
GLUCOSE SERPL-MCNC: 123 MG/DL (ref 74–99)
GLUCOSE UR STRIP-MCNC: >=1000 MG/DL
HCT VFR BLD AUTO: 46.2 % (ref 34–48)
HGB BLD-MCNC: 14.7 G/DL (ref 11.5–15.5)
HGB UR QL STRIP: ABNORMAL
IMM GRANULOCYTES # BLD: 0.04 E9/L
IMM GRANULOCYTES NFR BLD: 0.5 % (ref 0–5)
KETONES UR STRIP-MCNC: ABNORMAL MG/DL
LACTATE BLDV-SCNC: 1.5 MMOL/L (ref 0.5–2.2)
LEUKOCYTE ESTERASE UR QL STRIP: ABNORMAL
LIPASE: 34 U/L (ref 13–60)
LYMPHOCYTES # BLD: 1.96 E9/L (ref 1.5–4)
LYMPHOCYTES NFR BLD: 22.8 % (ref 20–42)
MCH RBC QN AUTO: 28.8 PG (ref 26–35)
MCHC RBC AUTO-ENTMCNC: 31.8 % (ref 32–34.5)
MCV RBC AUTO: 90.4 FL (ref 80–99.9)
MONOCYTES # BLD: 0.36 E9/L (ref 0.1–0.95)
MONOCYTES NFR BLD: 4.2 % (ref 2–12)
NEUTROPHILS # BLD: 6.09 E9/L (ref 1.8–7.3)
NEUTS SEG NFR BLD: 70.6 % (ref 43–80)
NITRITE UR QL STRIP: NEGATIVE
PH UR STRIP: 5.5 [PH] (ref 5–9)
PLATELET # BLD AUTO: 313 E9/L (ref 130–450)
PMV BLD AUTO: 9.7 FL (ref 7–12)
POTASSIUM SERPL-SCNC: 4.1 MMOL/L (ref 3.5–5)
PROT SERPL-MCNC: 7.6 G/DL (ref 6.4–8.3)
PROT UR STRIP-MCNC: NEGATIVE MG/DL
RBC # BLD AUTO: 5.11 E12/L (ref 3.5–5.5)
RBC #/AREA URNS HPF: ABNORMAL /HPF (ref 0–2)
SODIUM SERPL-SCNC: 141 MMOL/L (ref 132–146)
SP GR UR STRIP: 1.02 (ref 1–1.03)
SPECIMEN SOURCE FLD: NORMAL
STREP GRP A PCR: NEGATIVE
T VAGINALIS VAG QL WET PREP: NORMAL
UROBILINOGEN UR STRIP-ACNC: 0.2 E.U./DL
WBC # BLD: 8.6 E9/L (ref 4.5–11.5)
WBC #/AREA URNS HPF: ABNORMAL /HPF (ref 0–5)
YEAST VAG QL WET PREP: NORMAL

## 2023-06-26 PROCEDURE — 6360000002 HC RX W HCPCS: Performed by: STUDENT IN AN ORGANIZED HEALTH CARE EDUCATION/TRAINING PROGRAM

## 2023-06-26 PROCEDURE — 6370000000 HC RX 637 (ALT 250 FOR IP): Performed by: NURSE PRACTITIONER

## 2023-06-26 PROCEDURE — 83605 ASSAY OF LACTIC ACID: CPT

## 2023-06-26 PROCEDURE — 36415 COLL VENOUS BLD VENIPUNCTURE: CPT

## 2023-06-26 PROCEDURE — 87591 N.GONORRHOEAE DNA AMP PROB: CPT

## 2023-06-26 PROCEDURE — 87210 SMEAR WET MOUNT SALINE/INK: CPT

## 2023-06-26 PROCEDURE — 71046 X-RAY EXAM CHEST 2 VIEWS: CPT

## 2023-06-26 PROCEDURE — 83690 ASSAY OF LIPASE: CPT

## 2023-06-26 PROCEDURE — 74177 CT ABD & PELVIS W/CONTRAST: CPT

## 2023-06-26 PROCEDURE — 87491 CHLMYD TRACH DNA AMP PROBE: CPT

## 2023-06-26 PROCEDURE — 87088 URINE BACTERIA CULTURE: CPT

## 2023-06-26 PROCEDURE — 87880 STREP A ASSAY W/OPTIC: CPT

## 2023-06-26 PROCEDURE — 81001 URINALYSIS AUTO W/SCOPE: CPT

## 2023-06-26 PROCEDURE — 6360000002 HC RX W HCPCS: Performed by: NURSE PRACTITIONER

## 2023-06-26 PROCEDURE — 80053 COMPREHEN METABOLIC PANEL: CPT

## 2023-06-26 PROCEDURE — 85025 COMPLETE CBC W/AUTO DIFF WBC: CPT

## 2023-06-26 PROCEDURE — 6360000004 HC RX CONTRAST MEDICATION: Performed by: RADIOLOGY

## 2023-06-26 PROCEDURE — 96374 THER/PROPH/DIAG INJ IV PUSH: CPT

## 2023-06-26 PROCEDURE — 99285 EMERGENCY DEPT VISIT HI MDM: CPT

## 2023-06-26 PROCEDURE — 2580000003 HC RX 258: Performed by: NURSE PRACTITIONER

## 2023-06-26 RX ORDER — ROPINIROLE 0.5 MG/1
0.5 TABLET, FILM COATED ORAL 3 TIMES DAILY
COMMUNITY
Start: 2023-05-08 | End: 2023-06-30 | Stop reason: SDUPTHER

## 2023-06-26 RX ORDER — SODIUM CHLORIDE, SODIUM LACTATE, POTASSIUM CHLORIDE, CALCIUM CHLORIDE 600; 310; 30; 20 MG/100ML; MG/100ML; MG/100ML; MG/100ML
100 INJECTION, SOLUTION INTRAVENOUS CONTINUOUS
COMMUNITY
Start: 2022-01-06 | End: 2023-10-05 | Stop reason: WASHOUT

## 2023-06-26 RX ORDER — HEPARIN SODIUM 100 [USP'U]/ML
100 INJECTION, SOLUTION INTRAVENOUS PRN
Status: DISCONTINUED | OUTPATIENT
Start: 2023-06-26 | End: 2023-06-27 | Stop reason: HOSPADM

## 2023-06-26 RX ORDER — MAGNESIUM 250 MG
250 TABLET ORAL 2 TIMES DAILY
COMMUNITY
End: 2023-10-05 | Stop reason: WASHOUT

## 2023-06-26 RX ORDER — LEVOTHYROXINE SODIUM 50 UG/1
50 TABLET ORAL
COMMUNITY
End: 2023-10-05 | Stop reason: WASHOUT

## 2023-06-26 RX ORDER — KETOROLAC TROMETHAMINE 30 MG/ML
30 INJECTION, SOLUTION INTRAMUSCULAR; INTRAVENOUS ONCE
Status: COMPLETED | OUTPATIENT
Start: 2023-06-26 | End: 2023-06-26

## 2023-06-26 RX ORDER — 0.9 % SODIUM CHLORIDE 0.9 %
1000 INTRAVENOUS SOLUTION INTRAVENOUS ONCE
Status: COMPLETED | OUTPATIENT
Start: 2023-06-26 | End: 2023-06-26

## 2023-06-26 RX ORDER — LEVETIRACETAM 1000 MG/1
1000 TABLET ORAL 2 TIMES DAILY
COMMUNITY
Start: 2022-09-21 | End: 2023-10-05 | Stop reason: WASHOUT

## 2023-06-26 RX ORDER — GRANULES FOR ORAL 3 G/1
3 POWDER ORAL ONCE
Status: COMPLETED | OUTPATIENT
Start: 2023-06-26 | End: 2023-06-26

## 2023-06-26 RX ADMIN — KETOROLAC TROMETHAMINE 30 MG: 30 INJECTION, SOLUTION INTRAMUSCULAR; INTRAVENOUS at 21:04

## 2023-06-26 RX ADMIN — IOPAMIDOL 75 ML: 755 INJECTION, SOLUTION INTRAVENOUS at 19:39

## 2023-06-26 RX ADMIN — GRANULES FOR ORAL SOLUTION 1 PACKET: 3 POWDER ORAL at 21:09

## 2023-06-26 RX ADMIN — Medication 100 UNITS: at 22:39

## 2023-06-26 RX ADMIN — SODIUM CHLORIDE 1000 ML: 9 INJECTION, SOLUTION INTRAVENOUS at 21:04

## 2023-06-26 ASSESSMENT — PAIN DESCRIPTION - LOCATION: LOCATION: VAGINA

## 2023-06-26 ASSESSMENT — PAIN - FUNCTIONAL ASSESSMENT: PAIN_FUNCTIONAL_ASSESSMENT: 0-10

## 2023-06-26 ASSESSMENT — PAIN SCALES - GENERAL
PAINLEVEL_OUTOF10: 7
PAINLEVEL_OUTOF10: 7

## 2023-06-28 ENCOUNTER — OFFICE VISIT (OUTPATIENT)
Dept: PRIMARY CARE | Facility: CLINIC | Age: 48
End: 2023-06-28
Payer: MEDICAID

## 2023-06-28 VITALS
HEART RATE: 68 BPM | WEIGHT: 240 LBS | SYSTOLIC BLOOD PRESSURE: 110 MMHG | DIASTOLIC BLOOD PRESSURE: 80 MMHG | HEIGHT: 62 IN | BODY MASS INDEX: 44.16 KG/M2

## 2023-06-28 DIAGNOSIS — K31.84 DIABETIC GASTROPARESIS ASSOCIATED WITH TYPE 2 DIABETES MELLITUS (MULTI): ICD-10-CM

## 2023-06-28 DIAGNOSIS — B37.31 VAGINAL MONILIASIS: ICD-10-CM

## 2023-06-28 DIAGNOSIS — R56.9 FOCAL SEIZURES (MULTI): ICD-10-CM

## 2023-06-28 DIAGNOSIS — E27.40 ADRENAL INSUFFICIENCY (MULTI): ICD-10-CM

## 2023-06-28 DIAGNOSIS — D83.9 CVID (COMMON VARIABLE IMMUNODEFICIENCY) (MULTI): ICD-10-CM

## 2023-06-28 DIAGNOSIS — B37.81 THRUSH OF MOUTH AND ESOPHAGUS (MULTI): Primary | ICD-10-CM

## 2023-06-28 DIAGNOSIS — B37.0 THRUSH OF MOUTH AND ESOPHAGUS (MULTI): Primary | ICD-10-CM

## 2023-06-28 DIAGNOSIS — M35.01 SJOGREN'S SYNDROME WITH KERATOCONJUNCTIVITIS SICCA (MULTI): ICD-10-CM

## 2023-06-28 DIAGNOSIS — K21.9 GERD WITHOUT ESOPHAGITIS: ICD-10-CM

## 2023-06-28 DIAGNOSIS — E11.43 DIABETIC GASTROPARESIS ASSOCIATED WITH TYPE 2 DIABETES MELLITUS (MULTI): ICD-10-CM

## 2023-06-28 PROBLEM — S80.00XA KNEE CONTUSION: Status: RESOLVED | Noted: 2023-06-28 | Resolved: 2023-06-28

## 2023-06-28 PROBLEM — N60.11 BILATERAL FIBROCYSTIC BREAST CHANGES: Status: ACTIVE | Noted: 2018-08-21

## 2023-06-28 PROBLEM — H93.293 ABNORMAL AUDITORY PERCEPTION OF BOTH EARS: Status: ACTIVE | Noted: 2018-12-06

## 2023-06-28 PROBLEM — K12.1 MOUTH ULCER: Status: RESOLVED | Noted: 2017-05-17 | Resolved: 2023-06-28

## 2023-06-28 PROBLEM — R20.0 NUMBNESS AND TINGLING OF BOTH LEGS: Status: ACTIVE | Noted: 2018-07-01

## 2023-06-28 PROBLEM — M34.9 LIMITED SCLERODERMA (MULTI): Status: ACTIVE | Noted: 2018-04-13

## 2023-06-28 PROBLEM — M79.605 LEFT LEG PAIN: Status: RESOLVED | Noted: 2023-06-28 | Resolved: 2023-06-28

## 2023-06-28 PROBLEM — M19.90 DJD (DEGENERATIVE JOINT DISEASE): Status: RESOLVED | Noted: 2023-04-19 | Resolved: 2023-06-28

## 2023-06-28 PROBLEM — S80.00XA KNEE CONTUSION: Status: ACTIVE | Noted: 2023-06-28

## 2023-06-28 PROBLEM — M25.562 LEFT KNEE PAIN: Status: ACTIVE | Noted: 2023-06-28

## 2023-06-28 PROBLEM — K12.1 MOUTH ULCER: Status: ACTIVE | Noted: 2017-05-17

## 2023-06-28 PROBLEM — G44.019 EPISODIC CLUSTER HEADACHE, NOT INTRACTABLE: Status: ACTIVE | Noted: 2017-09-11

## 2023-06-28 PROBLEM — R20.2 PARESTHESIA: Status: ACTIVE | Noted: 2023-06-28

## 2023-06-28 PROBLEM — Z98.890 HISTORY OF ENDOMETRIAL ABLATION: Status: ACTIVE | Noted: 2017-11-09

## 2023-06-28 PROBLEM — Z98.890 HISTORY OF ENDOMETRIAL ABLATION: Status: RESOLVED | Noted: 2017-11-09 | Resolved: 2023-06-28

## 2023-06-28 PROBLEM — R55 SYNCOPE: Status: ACTIVE | Noted: 2018-12-28

## 2023-06-28 PROBLEM — M25.469 KNEE EFFUSION: Status: ACTIVE | Noted: 2023-06-28

## 2023-06-28 PROBLEM — H93.293 ABNORMAL AUDITORY PERCEPTION OF BOTH EARS: Status: RESOLVED | Noted: 2018-12-06 | Resolved: 2023-06-28

## 2023-06-28 PROBLEM — K30: Status: ACTIVE | Noted: 2019-01-11

## 2023-06-28 PROBLEM — H91.22 SUDDEN IDIOPATHIC HEARING LOSS OF LEFT EAR: Status: RESOLVED | Noted: 2018-11-11 | Resolved: 2023-06-28

## 2023-06-28 PROBLEM — R26.89 IMBALANCE: Status: ACTIVE | Noted: 2018-11-11

## 2023-06-28 PROBLEM — N60.12 BILATERAL FIBROCYSTIC BREAST CHANGES: Status: ACTIVE | Noted: 2018-08-21

## 2023-06-28 PROBLEM — D50.9 IRON DEFICIENCY ANEMIA: Status: ACTIVE | Noted: 2017-11-14

## 2023-06-28 PROBLEM — H91.22 SUDDEN IDIOPATHIC HEARING LOSS OF LEFT EAR: Status: ACTIVE | Noted: 2018-11-11

## 2023-06-28 PROBLEM — R92.1 BREAST CALCIFICATION, RIGHT: Status: ACTIVE | Noted: 2018-08-21

## 2023-06-28 PROBLEM — M79.605 LEFT LEG PAIN: Status: ACTIVE | Noted: 2023-06-28

## 2023-06-28 PROBLEM — M25.469 KNEE EFFUSION: Status: RESOLVED | Noted: 2023-06-28 | Resolved: 2023-06-28

## 2023-06-28 PROBLEM — M25.562 LEFT KNEE PAIN: Status: RESOLVED | Noted: 2023-06-28 | Resolved: 2023-06-28

## 2023-06-28 PROBLEM — R20.2 NUMBNESS AND TINGLING OF BOTH LEGS: Status: ACTIVE | Noted: 2018-07-01

## 2023-06-28 PROBLEM — R11.2 NAUSEA AND/OR VOMITING: Status: ACTIVE | Noted: 2023-06-28

## 2023-06-28 PROBLEM — R55 SYNCOPE: Status: RESOLVED | Noted: 2018-12-28 | Resolved: 2023-06-28

## 2023-06-28 PROBLEM — R26.89 IMBALANCE: Status: RESOLVED | Noted: 2018-11-11 | Resolved: 2023-06-28

## 2023-06-28 LAB — BACTERIA UR CULT: NORMAL

## 2023-06-28 PROCEDURE — 3079F DIAST BP 80-89 MM HG: CPT | Performed by: INTERNAL MEDICINE

## 2023-06-28 PROCEDURE — 3051F HG A1C>EQUAL 7.0%<8.0%: CPT | Performed by: INTERNAL MEDICINE

## 2023-06-28 PROCEDURE — 3008F BODY MASS INDEX DOCD: CPT | Performed by: INTERNAL MEDICINE

## 2023-06-28 PROCEDURE — 99213 OFFICE O/P EST LOW 20 MIN: CPT | Performed by: INTERNAL MEDICINE

## 2023-06-28 PROCEDURE — 3074F SYST BP LT 130 MM HG: CPT | Performed by: INTERNAL MEDICINE

## 2023-06-28 PROCEDURE — 1036F TOBACCO NON-USER: CPT | Performed by: INTERNAL MEDICINE

## 2023-06-28 RX ORDER — FLUCONAZOLE 100 MG/1
100 TABLET ORAL DAILY
Qty: 5 TABLET | Refills: 0 | Status: SHIPPED | OUTPATIENT
Start: 2023-06-28 | End: 2023-07-03

## 2023-06-28 ASSESSMENT — ENCOUNTER SYMPTOMS: TROUBLE SWALLOWING: 1

## 2023-06-28 NOTE — PROGRESS NOTES
"Subjective   Reason for Visit: Jonathan Ayala is an 47 y.o. female here for a visit.     Past Medical, Surgical, and Family History reviewed and updated in chart.    Reviewed all medications by prescribing practitioner or clinical pharmacist (such as prescriptions, OTCs, herbal therapies and supplements) and documented in the medical record.    Patient discharged after 2 hospital visits at [facility] emergency department where she presented with acute pharyngitis symptoms diagnosed as viral pharyngitis and acute cystitis with lower abdominal pain and what appears to be yeast vaginitis she is status post hysterectomy nephrectomy does not have a cervix does not report any high risk behavior for GC chlamydia cultures were taken patient reports current discharge and also oropharyngeal thrush she underwent strep testing for pharyngitis also GC chlamydia testing wet prep genital was completed urinalysis revealed trace bacteriuria was given fosfomycin she was given oral nystatin swish and swallow for thrush and esophagitis/pharyngitis suspect candidal.  Common variable immunodeficiency syndrome under care of of immunologist receiving immunoglobin therapy on a 2-week basis for recurrent infections he had noticed some mild vestibular changes of her ears bilaterally she is not having any significant breathing difficulties CAT scan of the lower abdomen revealed no acute abnormalities or colitis chest x-ray was clear of any respiratory infection    ER Follow-up  Associated symptoms include congestion.       Patient Care Team:  Isidoro Mensah DO as PCP - General  RAUL Retana-CNP as PCP - [facility] Medicaid PCP  Britney Phipps LPN as Care Manager (Case Management)     Review of Systems   HENT:  Positive for congestion, mouth sores and trouble swallowing.    Genitourinary:  Positive for vaginal discharge and vaginal pain.       Objective   Vitals:  /80   Pulse 68   Ht 1.575 m (5' 2\")   Wt 109 kg (240 lb)   " BMI 43.90 kg/m²       Physical Exam  Constitutional:       Appearance: Normal appearance. She is obese.   HENT:      Right Ear: Tympanic membrane, ear canal and external ear normal.      Left Ear: Tympanic membrane, ear canal and external ear normal.      Mouth/Throat:      Mouth: Mucous membranes are dry.      Comments: Mild thrush noted in oropharynx palatal no evidence of tonsillar exudate erythema  Neurological:      Mental Status: She is alert.         Assessment/Plan   Problem List Items Addressed This Visit       CVID (common variable immunodeficiency) (CMS/[facility])    Overview     Last Assessment & Plan: Formatting of this note might be different from the original. Intravenous immunoglobulin G as directed by hematologist         Current Assessment & Plan     Continue therapy based with pulmonologist and allergist immunologist         Focal seizures (CMS/[facility])    Current Assessment & Plan     Stable at this time follows with neurologist on antiseizure medication         Sjogren's syndrome with keratoconjunctivitis sicca (CMS/[facility])    Current Assessment & Plan     Review records from previous rheumatologist.  Continue to monitor work-up of Sjogren's         Adrenal insufficiency (CMS/[facility])    Current Assessment & Plan     Iatrogenic based no evidence of severe adrenal insufficiency not requiring chronic use of corticosteroids at this time continue to monitor         Thrush of mouth and esophagus (CMS/[facility]) - Primary    Current Assessment & Plan     Secondary to C VID.  Also increase use of antibiotics and appropriate given by emergency room.  Continue with oral nystatin swish swallow with use of oral fluconazole appropriate use of inhaler with Advair with rinsing of mouth after use         Relevant Orders    Follow Up In [facility] - PCP - Established    Vaginal moniliasis    Current Assessment & Plan     Fluconazole 100 mg 1 tablet daily for 5 days         Relevant Medications    fluconazole (Diflucan) 100  mg tablet        Patient was identified as a fall risk. Risk prevention instructions provided.

## 2023-06-28 NOTE — PROGRESS NOTES
"Subjective   Patient ID: Jonathan Ayala is a 47 y.o. female who presents for ER Follow-up (Sore throat, cough, COVID neg, FLU neg, Strep neg,/Vaginal Discharge did testing no results back yet, ).    HPI     Review of Systems    Objective   /80   Pulse 68   Ht 1.575 m (5' 2\")   Wt 109 kg (240 lb)   BMI 43.90 kg/m²     Physical Exam    Assessment/Plan          "

## 2023-06-28 NOTE — ASSESSMENT & PLAN NOTE
Secondary to C VID.  Also increase use of antibiotics and appropriate given by emergency room.  Continue with oral nystatin swish swallow with use of oral fluconazole appropriate use of inhaler with Advair with rinsing of mouth after use

## 2023-06-28 NOTE — ASSESSMENT & PLAN NOTE
Iatrogenic based no evidence of severe adrenal insufficiency not requiring chronic use of corticosteroids at this time continue to monitor

## 2023-06-28 NOTE — PATIENT INSTRUCTIONS

## 2023-06-29 LAB
C TRACH DNA SPEC QL NAA+PROBE: NEGATIVE
N GONORRHOEA DNA SPEC QL NAA+PROBE: NEGATIVE
SPECIMEN SOURCE: NORMAL

## 2023-06-30 ENCOUNTER — TELEPHONE (OUTPATIENT)
Dept: PRIMARY CARE | Facility: CLINIC | Age: 48
End: 2023-06-30
Payer: MEDICAID

## 2023-06-30 DIAGNOSIS — G93.2 BENIGN INTRACRANIAL HYPERTENSION: ICD-10-CM

## 2023-06-30 DIAGNOSIS — I10 BENIGN ESSENTIAL HYPERTENSION: Primary | ICD-10-CM

## 2023-06-30 DIAGNOSIS — G25.81 RESTLESS LEGS SYNDROME: ICD-10-CM

## 2023-06-30 RX ORDER — ROPINIROLE 0.5 MG/1
0.5 TABLET, FILM COATED ORAL 3 TIMES DAILY
Qty: 90 TABLET | Refills: 1 | Status: SHIPPED | OUTPATIENT
Start: 2023-06-30 | End: 2023-10-05 | Stop reason: WASHOUT

## 2023-06-30 RX ORDER — PROPRANOLOL HYDROCHLORIDE 10 MG/1
10 TABLET ORAL 3 TIMES DAILY
Qty: 90 TABLET | Refills: 1 | Status: SHIPPED | OUTPATIENT
Start: 2023-06-30 | End: 2024-01-09 | Stop reason: SDUPTHER

## 2023-06-30 NOTE — TELEPHONE ENCOUNTER
----- Message from Jonathan Ayala sent at 6/30/2023  7:50 AM EDT -----  Regarding: Rx refills & Urinalysis ?  Contact: [phone]  Rx refills needed. Ropinirole 0.5 mg take 1 tab in am & 2 tabs in evening  Propranolol 10 mg take 1 tab tid. Rx get called into [facility] in [place], [place] on [facility]. Thanks During infusion yesterday my diastolic was started at 95 and during infusion was in low 100’s not above  105. Came down to high 80’s or low 90’s ( can’t remember exact number) after receiving the lactaid ringers after IVIG which I get prior to infusion as well. [facility] [place] it said ,000 and I did a clean catch. Didn’t know if you felt that needed rechecked if burning continues after finishing diflucan? Cause possible it was contaminated by yeast?  Thanks. Jonathan

## 2023-07-04 PROBLEM — E11.43 TYPE 2 DIABETES MELLITUS WITH DIABETIC AUTONOMIC NEUROPATHY, WITH LONG-TERM CURRENT USE OF INSULIN (MULTI): Status: ACTIVE | Noted: 2023-07-04

## 2023-07-04 PROBLEM — K30: Status: RESOLVED | Noted: 2019-01-11 | Resolved: 2023-07-04

## 2023-07-04 PROBLEM — H46.9 OPTIC NEURITIS: Status: RESOLVED | Noted: 2020-11-06 | Resolved: 2023-07-04

## 2023-07-04 PROBLEM — G90.A POSTURAL ORTHOSTATIC TACHYCARDIA SYNDROME: Status: RESOLVED | Noted: 2020-08-11 | Resolved: 2023-07-04

## 2023-07-04 PROBLEM — R20.2 NUMBNESS AND TINGLING OF BOTH LEGS: Status: RESOLVED | Noted: 2018-07-01 | Resolved: 2023-07-04

## 2023-07-04 PROBLEM — Z79.4 TYPE 2 DIABETES MELLITUS WITH DIABETIC AUTONOMIC NEUROPATHY, WITH LONG-TERM CURRENT USE OF INSULIN (MULTI): Status: ACTIVE | Noted: 2023-07-04

## 2023-07-04 PROBLEM — R20.2 PARESTHESIA: Status: RESOLVED | Noted: 2023-06-28 | Resolved: 2023-07-04

## 2023-07-04 PROBLEM — R20.0 NUMBNESS AND TINGLING OF BOTH LEGS: Status: RESOLVED | Noted: 2018-07-01 | Resolved: 2023-07-04

## 2023-07-04 PROBLEM — K58.0 IRRITABLE BOWEL SYNDROME WITH DIARRHEA: Status: ACTIVE | Noted: 2020-08-11

## 2023-07-04 PROBLEM — R92.1 BREAST CALCIFICATION, RIGHT: Status: RESOLVED | Noted: 2018-08-21 | Resolved: 2023-07-04

## 2023-07-04 PROBLEM — F33.42 MAJOR DEPRESSIVE DISORDER, RECURRENT, IN FULL REMISSION WITH ANXIOUS DISTRESS (CMS-HCC): Status: ACTIVE | Noted: 2020-08-11

## 2023-07-04 PROBLEM — E66.813 CLASS 3 SEVERE OBESITY DUE TO EXCESS CALORIES WITH SERIOUS COMORBIDITY AND BODY MASS INDEX (BMI) OF 40.0 TO 44.9 IN ADULT: Status: ACTIVE | Noted: 2023-04-19

## 2023-07-04 PROBLEM — R11.2 NAUSEA AND/OR VOMITING: Status: RESOLVED | Noted: 2023-06-28 | Resolved: 2023-07-04

## 2023-07-04 PROBLEM — R19.7 DIARRHEA: Status: RESOLVED | Noted: 2020-08-11 | Resolved: 2023-07-04

## 2023-07-04 PROBLEM — E11.43 DIABETIC GASTROPARESIS ASSOCIATED WITH TYPE 2 DIABETES MELLITUS (MULTI): Status: ACTIVE | Noted: 2020-08-11

## 2023-07-04 NOTE — ASSESSMENT & PLAN NOTE
Per GI patient removed off antispasmodic medications ineffective lansoprazole 30 mg twice a day was replaced with Aciphex 20 mg 1 tablet daily continue

## 2023-07-13 ENCOUNTER — PATIENT OUTREACH (OUTPATIENT)
Dept: PRIMARY CARE | Facility: CLINIC | Age: 48
End: 2023-07-13
Payer: MEDICAID

## 2023-07-13 NOTE — PROGRESS NOTES
Discharge Facility: [facility]   Discharge Diagnosis: Nausea and vomiting, unspecified vomiting type [R11.2]  Discharge Disposition: Home   Admission Date: 7-6-23  Discharge Date: 7-12-23     PCP Appointment Date:  Specialist Appointment Date:   Hospital Encounter and Summary: not available at this time   See discharge assessment below for further details

## 2023-07-20 ENCOUNTER — APPOINTMENT (OUTPATIENT)
Dept: PRIMARY CARE | Facility: CLINIC | Age: 48
End: 2023-07-20
Payer: MEDICAID

## 2023-08-01 ENCOUNTER — APPOINTMENT (OUTPATIENT)
Dept: LAB | Facility: LAB | Age: 48
End: 2023-08-01
Payer: MEDICAID

## 2023-08-01 LAB — SEDIMENTATION RATE, ERYTHROCYTE: 19 MM/H (ref 0–20)

## 2023-08-02 LAB
ANA PATTERN: ABNORMAL
ANA TITER: ABNORMAL
ANTI-CENTROMERE: 6.1 AI
ANTI-CHROMATIN: <0.2 AI
ANTI-DNA (DS): 1 IU/ML
ANTI-JO-1 IGG: <0.2 AI
ANTI-NUCLEAR ANTIBODY (ANA): POSITIVE
ANTI-RIBOSOMAL P: <0.2 AI
ANTI-RNP: 0.3 AI
ANTI-SCL-70: <0.2 AI
ANTI-SM/RNP: <0.2 AI
ANTI-SM: <0.2 AI
ANTI-SSA: 0.5 AI
ANTI-SSB: <0.2 AI
C REACTIVE PROTEIN (MG/L) IN SER/PLAS BY HIGH SENSIT: 3.2 MG/L

## 2023-08-03 DIAGNOSIS — J30.2 PERENNIAL ALLERGIC RHINITIS WITH SEASONAL VARIATION: Primary | ICD-10-CM

## 2023-08-03 DIAGNOSIS — J30.89 PERENNIAL ALLERGIC RHINITIS WITH SEASONAL VARIATION: Primary | ICD-10-CM

## 2023-08-03 RX ORDER — FLUTICASONE PROPIONATE 50 MCG
1 SPRAY, SUSPENSION (ML) NASAL DAILY
Qty: 16 G | Refills: 2 | Status: SHIPPED | OUTPATIENT
Start: 2023-08-03 | End: 2023-12-04

## 2023-08-07 ENCOUNTER — APPOINTMENT (OUTPATIENT)
Dept: PRIMARY CARE | Facility: CLINIC | Age: 48
End: 2023-08-07
Payer: MEDICAID

## 2023-08-07 LAB — HLAB27 TYPING: POSITIVE

## 2023-08-09 NOTE — CARE COORDINATION
Date of Service: 07/28/2023    COMPREHENSIVE MEDICAL NEUROPSYCHIATRIC 25-MINUTE TELEPHONIC FOLLOWUP EVALUATION    This is a 25-minute medical neuropsychiatric followup encounter.    SERVICE CODE:   13232, modifier 93.    ENCOUNTER DIAGNOSIS:   F20.0, chronic paranoid schizophrenia.    Jose Guadalupe is very pleasant and interactive in this medical neuropsychiatric 25-minute telephonic followup encounter.      Jose Guadalupe reports increased dysphoria and anxiety related to persistent delusions of having his mind controlled by his older brother.  This has been a difficult symptom impervious to treatment including Clozapine.    MENTAL STATUS EXAMINATION:    Otherwise, reveals no signs of thought disorder nor of organic mental disease.  No hallucinosis is described.    A 14-point medical review of systems is negative or noncontributory aside from that described above.  Alcohol and other drug abuse are denied.     Jose Guadalupe is provided a comprehensive medical neuropsychiatric medication regimen as thoroughly reviewed, discussed, adjusted and prescribed as per [facility].  Jose Guadalupe will return for close followup in 2 weeks.      Dictated By: Garland Marcelino MD  Signing Provider: MD RODERICK Fonseca/coco ([id number])   DD: 08/07/2023 11:45:02 PM TD: 08/09/2023 2:54:47 AM      
Left a message for patient to return call re: ED Follow-up for At Risk COVID-19
yes  Patient's preferred e-mail:  [email or web address]  Patient's preferred phone number: [phone]  Based on Loop alert triggers, patient will be contacted by nurse care manager for worsening symptoms. Pt will be further monitored by COVID Loop Team based on severity of symptoms and risk factors. Pt returned RN's call regarding her ED visit on 9/29/2020 for back pain/flank pain and fever. Pt recently dx with UTI and has been on Macrobid. Pt states that she has a dose tonight to take and 1 tomorrow morning. Pt states that she is still feeling \"a little crappy. \" States today she is mostly feeling weak and fatigued. Denies any fevers today or dysuria. Pt has a f/u already scheduled with her pcp on 10/5/2020. Pt does have a hx of seeing Dr. Anabelle Mosher (urology) in [place], but is thinking about switching to new urologist. Pt plans on discussing with her pcp on upcoming appointment. Pt given information on walk-in care locations and current flu clinic locations for future reference. Pt asking if RN can email this information to her. Will email her updated flyer on these clinics. Pt agreeable to enroll in Loop monitoring system. Pt active with a [employer] account. Pt's health care decision maker information reviewed and updated at this time. Pt has advance directives made out, but is going to get them notarized. Pt states that she would like to bring them in to her next appointment to be scanned into her chart.

## 2023-08-18 ENCOUNTER — TELEPHONE (OUTPATIENT)
Dept: PRIMARY CARE | Facility: CLINIC | Age: 48
End: 2023-08-18
Payer: MEDICAID

## 2023-08-18 NOTE — TELEPHONE ENCOUNTER
[facility] had a diagnosis code for blood work (IT levels)that was pushed back to be billed and wants to know if we would have another code they can use or should they cancel code and resend. [phone]

## 2023-08-29 PROBLEM — H93.13 SUBJECTIVE TINNITUS OF BOTH EARS: Status: ACTIVE | Noted: 2023-08-29

## 2023-08-29 PROBLEM — H54.7 FUNCTIONAL VISION PROBLEM: Status: ACTIVE | Noted: 2023-08-29

## 2023-08-29 PROBLEM — H53.9 VISION ABNORMALITIES: Status: ACTIVE | Noted: 2023-08-29

## 2023-08-29 PROBLEM — G44.209 TENSION HEADACHE: Status: ACTIVE | Noted: 2023-08-29

## 2023-08-29 PROBLEM — K31.84 GASTROPARESIS: Status: ACTIVE | Noted: 2020-08-11

## 2023-08-29 RX ORDER — DICLOFENAC SODIUM 50 MG/1
50 TABLET, DELAYED RELEASE ORAL 3 TIMES DAILY PRN
COMMUNITY
Start: 2023-03-06 | End: 2024-01-18 | Stop reason: SDUPTHER

## 2023-08-29 RX ORDER — ROPINIROLE 1 MG/1
1 TABLET, FILM COATED ORAL DAILY
COMMUNITY

## 2023-08-29 RX ORDER — PHENTERMINE HYDROCHLORIDE 37.5 MG/1
1 TABLET ORAL DAILY
COMMUNITY
End: 2023-10-05 | Stop reason: WASHOUT

## 2023-08-29 RX ORDER — RABEPRAZOLE SODIUM 20 MG/1
20 TABLET, DELAYED RELEASE ORAL EVERY MORNING
COMMUNITY
Start: 2023-06-28 | End: 2023-10-05 | Stop reason: WASHOUT

## 2023-08-29 RX ORDER — DULAGLUTIDE 1.5 MG/.5ML
INJECTION, SOLUTION SUBCUTANEOUS
COMMUNITY
Start: 2023-05-30 | End: 2023-10-05

## 2023-08-29 RX ORDER — HYDROCORTISONE SODIUM SUCCINATE 100 MG/2ML
INJECTION, POWDER, FOR SOLUTION INTRAMUSCULAR; INTRAVENOUS
COMMUNITY
Start: 2022-12-01 | End: 2023-10-05 | Stop reason: WASHOUT

## 2023-08-29 RX ORDER — OMEGA-3-ACID ETHYL ESTERS 1 G/1
2 CAPSULE, LIQUID FILLED ORAL 2 TIMES DAILY
COMMUNITY
End: 2023-10-05 | Stop reason: WASHOUT

## 2023-08-29 RX ORDER — DOCUSATE SODIUM 100 MG/1
1 CAPSULE, LIQUID FILLED ORAL DAILY
COMMUNITY
End: 2023-10-05 | Stop reason: WASHOUT

## 2023-08-29 RX ORDER — CLOTRIMAZOLE 1 %
CREAM (GRAM) TOPICAL
COMMUNITY

## 2023-08-29 RX ORDER — PANTOPRAZOLE SODIUM 40 MG/1
40 TABLET, DELAYED RELEASE ORAL 2 TIMES DAILY
COMMUNITY
Start: 2022-09-12 | End: 2023-10-05 | Stop reason: WASHOUT

## 2023-08-29 RX ORDER — CLARITHROMYCIN 500 MG/1
2 TABLET, FILM COATED ORAL DAILY
COMMUNITY
End: 2023-09-01 | Stop reason: ALTCHOICE

## 2023-08-29 RX ORDER — TIZANIDINE HYDROCHLORIDE 2 MG/1
1 CAPSULE, GELATIN COATED ORAL 3 TIMES DAILY
COMMUNITY
Start: 2023-02-08 | End: 2024-02-16 | Stop reason: SINTOL

## 2023-08-29 RX ORDER — LEVOTHYROXINE SODIUM 112 UG/1
1 TABLET ORAL DAILY
COMMUNITY
End: 2023-10-05 | Stop reason: WASHOUT

## 2023-08-29 RX ORDER — METOPROLOL SUCCINATE 100 MG/1
100 TABLET, EXTENDED RELEASE ORAL DAILY
COMMUNITY
End: 2023-10-05 | Stop reason: WASHOUT

## 2023-08-29 RX ORDER — SEMAGLUTIDE 1.34 MG/ML
INJECTION, SOLUTION SUBCUTANEOUS
COMMUNITY
Start: 2023-08-16 | End: 2023-10-05 | Stop reason: SDUPTHER

## 2023-08-29 RX ORDER — FLUDROCORTISONE ACETATE 0.1 MG/1
1 TABLET ORAL DAILY
COMMUNITY
End: 2023-10-05 | Stop reason: WASHOUT

## 2023-08-29 RX ORDER — ACETAMINOPHEN 500 MG
1 TABLET ORAL DAILY
COMMUNITY
End: 2023-10-05 | Stop reason: WASHOUT

## 2023-08-29 RX ORDER — ARIPIPRAZOLE 5 MG/1
1 TABLET ORAL DAILY
COMMUNITY
End: 2023-10-05

## 2023-08-29 RX ORDER — PANCRELIPASE 60000; 12000; 38000 [USP'U]/1; [USP'U]/1; [USP'U]/1
2 CAPSULE, DELAYED RELEASE PELLETS ORAL 3 TIMES DAILY
COMMUNITY
End: 2023-10-05 | Stop reason: WASHOUT

## 2023-08-29 RX ORDER — PANTOPRAZOLE SODIUM 40 MG/1
1 TABLET, DELAYED RELEASE ORAL DAILY
COMMUNITY
End: 2023-10-05 | Stop reason: WASHOUT

## 2023-08-29 RX ORDER — LEVETIRACETAM 1000 MG/1
1.5 TABLET ORAL 2 TIMES DAILY
COMMUNITY
Start: 2022-09-21 | End: 2023-10-05 | Stop reason: WASHOUT

## 2023-08-29 RX ORDER — NAPROXEN 500 MG/1
1 TABLET ORAL EVERY 12 HOURS PRN
COMMUNITY
Start: 2023-06-13 | End: 2023-10-05 | Stop reason: WASHOUT

## 2023-08-29 RX ORDER — DULAGLUTIDE 0.75 MG/.5ML
INJECTION, SOLUTION SUBCUTANEOUS
COMMUNITY
Start: 2023-04-07 | End: 2023-10-05 | Stop reason: WASHOUT

## 2023-08-29 RX ORDER — NABUMETONE 750 MG/1
750 TABLET, FILM COATED ORAL 2 TIMES DAILY
COMMUNITY
End: 2023-10-05 | Stop reason: WASHOUT

## 2023-08-29 RX ORDER — GABAPENTIN 100 MG/1
1 CAPSULE ORAL DAILY
COMMUNITY
End: 2023-10-05 | Stop reason: WASHOUT

## 2023-08-29 RX ORDER — TIOTROPIUM BROMIDE INHALATION SPRAY 1.56 UG/1
2 SPRAY, METERED RESPIRATORY (INHALATION) EVERY MORNING
COMMUNITY
Start: 2022-08-18 | End: 2023-10-05 | Stop reason: WASHOUT

## 2023-08-29 RX ORDER — POLYETHYLENE GLYCOL 3350 17 G/17G
POWDER, FOR SOLUTION ORAL
COMMUNITY
Start: 2023-07-06 | End: 2023-10-05 | Stop reason: WASHOUT

## 2023-08-29 RX ORDER — AZELASTINE 1 MG/ML
2 SPRAY, METERED NASAL 2 TIMES DAILY
COMMUNITY
Start: 2023-03-05 | End: 2023-10-05 | Stop reason: WASHOUT

## 2023-08-29 RX ORDER — LANOLIN ALCOHOL/MO/W.PET/CERES
400 CREAM (GRAM) TOPICAL DAILY
COMMUNITY
End: 2023-10-05 | Stop reason: WASHOUT

## 2023-08-29 RX ORDER — CAFFEINE 200 MG
1 TABLET ORAL AS NEEDED
COMMUNITY
End: 2023-10-05 | Stop reason: WASHOUT

## 2023-08-29 RX ORDER — GLUCAGON 3 MG/1
POWDER NASAL
COMMUNITY
Start: 2022-08-31 | End: 2024-04-02 | Stop reason: SDUPTHER

## 2023-08-29 RX ORDER — PANCRELIPASE LIPASE, PANCRELIPASE PROTEASE, PANCRELIPASE AMYLASE 20000; 63000; 84000 [USP'U]/1; [USP'U]/1; [USP'U]/1
CAPSULE, DELAYED RELEASE ORAL
COMMUNITY
End: 2023-10-05 | Stop reason: WASHOUT

## 2023-08-29 RX ORDER — LIDOCAINE HYDROCHLORIDE 20 MG/ML
SOLUTION OROPHARYNGEAL
COMMUNITY
Start: 2023-06-24 | End: 2023-10-05 | Stop reason: WASHOUT

## 2023-08-29 RX ORDER — LEVOTHYROXINE SODIUM 50 UG/1
TABLET ORAL
COMMUNITY
End: 2023-10-05 | Stop reason: WASHOUT

## 2023-08-29 RX ORDER — LEVETIRACETAM 750 MG/1
TABLET ORAL
COMMUNITY
Start: 2023-06-23 | End: 2023-10-05 | Stop reason: WASHOUT

## 2023-08-29 RX ORDER — CYCLOBENZAPRINE HCL 10 MG
10 TABLET ORAL NIGHTLY PRN
COMMUNITY
Start: 2023-02-09 | End: 2023-10-05 | Stop reason: WASHOUT

## 2023-08-29 RX ORDER — METFORMIN HYDROCHLORIDE 500 MG/1
500 TABLET ORAL DAILY
COMMUNITY
End: 2023-10-05 | Stop reason: WASHOUT

## 2023-08-29 RX ORDER — TACROLIMUS 1 MG/G
OINTMENT TOPICAL
COMMUNITY
Start: 2020-04-13 | End: 2023-10-05 | Stop reason: WASHOUT

## 2023-08-29 RX ORDER — GALCANEZUMAB 120 MG/ML
INJECTION, SOLUTION SUBCUTANEOUS
COMMUNITY
End: 2023-10-05 | Stop reason: WASHOUT

## 2023-08-29 RX ORDER — MILNACIPRAN HYDROCHLORIDE 100 MG/1
1 TABLET, FILM COATED ORAL EVERY 12 HOURS
COMMUNITY
End: 2023-10-05 | Stop reason: WASHOUT

## 2023-08-29 RX ORDER — LACOSAMIDE 50 MG/1
1 TABLET ORAL EVERY 12 HOURS
COMMUNITY
Start: 2023-06-23

## 2023-08-29 RX ORDER — LORATADINE 10 MG/1
10 TABLET ORAL DAILY
COMMUNITY
End: 2023-10-05 | Stop reason: WASHOUT

## 2023-08-29 RX ORDER — FLUTICASONE PROPIONATE 50 MCG
1 SPRAY, SUSPENSION (ML) NASAL 2 TIMES DAILY
COMMUNITY
End: 2023-10-05 | Stop reason: SDUPTHER

## 2023-08-29 RX ORDER — DIBASIC SODIUM PHOSPHATE, MONOBASIC POTASSIUM PHOSPHATE AND MONOBASIC SODIUM PHOSPHATE 852; 155; 130 MG/1; MG/1; MG/1
1 TABLET ORAL 2 TIMES DAILY
COMMUNITY
Start: 2023-02-22 | End: 2023-10-05 | Stop reason: WASHOUT

## 2023-08-29 RX ORDER — HYDROCODONE BITARTRATE AND ACETAMINOPHEN 5; 325 MG/1; MG/1
1 TABLET ORAL EVERY 6 HOURS PRN
COMMUNITY
Start: 2023-06-05 | End: 2023-10-05 | Stop reason: WASHOUT

## 2023-08-29 RX ORDER — CEVIMELINE HYDROCHLORIDE 30 MG/1
1 CAPSULE ORAL EVERY 8 HOURS
COMMUNITY
End: 2023-10-05 | Stop reason: WASHOUT

## 2023-08-29 RX ORDER — MOXIFLOXACIN HYDROCHLORIDE 400 MG/1
1 TABLET ORAL DAILY
COMMUNITY
End: 2023-09-01 | Stop reason: ALTCHOICE

## 2023-08-29 RX ORDER — NYSTATIN 100000 [USP'U]/ML
SUSPENSION ORAL AS NEEDED
COMMUNITY
Start: 2023-06-26 | End: 2024-01-04 | Stop reason: WASHOUT

## 2023-08-29 RX ORDER — DICYCLOMINE HYDROCHLORIDE 10 MG/1
1-2 CAPSULE ORAL EVERY 6 HOURS PRN
COMMUNITY
End: 2023-10-05 | Stop reason: WASHOUT

## 2023-08-29 RX ORDER — ASPIRIN 81 MG/1
81 TABLET ORAL DAILY
COMMUNITY
End: 2023-10-05

## 2023-08-29 RX ORDER — CALCIUM CARBONATE 200(500)MG
1 TABLET,CHEWABLE ORAL DAILY
COMMUNITY
End: 2023-10-05 | Stop reason: WASHOUT

## 2023-08-29 RX ORDER — AMITRIPTYLINE HYDROCHLORIDE 10 MG/1
1 TABLET, FILM COATED ORAL DAILY
COMMUNITY
End: 2023-10-05

## 2023-08-29 RX ORDER — FREMANEZUMAB-VFRM 225 MG/1.5ML
INJECTION SUBCUTANEOUS
COMMUNITY
Start: 2023-02-14 | End: 2023-10-05 | Stop reason: WASHOUT

## 2023-08-29 RX ORDER — ESOMEPRAZOLE MAGNESIUM 40 MG/1
40 CAPSULE, DELAYED RELEASE ORAL 2 TIMES DAILY
COMMUNITY
Start: 2023-06-11 | End: 2023-10-05 | Stop reason: WASHOUT

## 2023-08-29 RX ORDER — SENNOSIDES 8.6 MG
1-2 TABLET ORAL NIGHTLY PRN
COMMUNITY
Start: 2023-07-31

## 2023-08-29 RX ORDER — IBUPROFEN 100 MG/5ML
1000 SUSPENSION, ORAL (FINAL DOSE FORM) ORAL DAILY
COMMUNITY

## 2023-08-29 RX ORDER — DIPHENHYDRAMINE HYDROCHLORIDE 50 MG/ML
INJECTION, SOLUTION INTRAMUSCULAR; INTRAVENOUS
COMMUNITY
Start: 2023-05-15 | End: 2023-10-05 | Stop reason: WASHOUT

## 2023-08-29 RX ORDER — ROPINIROLE 0.5 MG/1
0.5 TABLET, FILM COATED ORAL EVERY MORNING
COMMUNITY

## 2023-08-29 RX ORDER — INSULIN GLARGINE 300 U/ML
INJECTION, SOLUTION SUBCUTANEOUS
COMMUNITY
Start: 2023-08-14 | End: 2024-01-04 | Stop reason: WASHOUT

## 2023-08-29 RX ORDER — PROMETHAZINE HYDROCHLORIDE 12.5 MG/1
12.5 TABLET ORAL AS NEEDED
COMMUNITY
Start: 2023-08-18

## 2023-08-29 RX ORDER — PANCRELIPASE 36000; 180000; 114000 [USP'U]/1; [USP'U]/1; [USP'U]/1
CAPSULE, DELAYED RELEASE PELLETS ORAL
COMMUNITY
Start: 2016-03-21 | End: 2023-10-05 | Stop reason: WASHOUT

## 2023-08-29 RX ORDER — ONDANSETRON 4 MG/1
2 TABLET, ORALLY DISINTEGRATING ORAL EVERY 8 HOURS PRN
COMMUNITY
Start: 2023-06-15 | End: 2023-11-14 | Stop reason: ALTCHOICE

## 2023-08-29 RX ORDER — LEVOTHYROXINE SODIUM 125 UG/1
CAPSULE ORAL
COMMUNITY
End: 2023-10-05 | Stop reason: WASHOUT

## 2023-08-29 RX ORDER — QUINIDINE GLUCONATE 324 MG
1 TABLET, EXTENDED RELEASE ORAL DAILY
COMMUNITY

## 2023-08-29 RX ORDER — BLOOD-GLUCOSE SENSOR
EACH MISCELLANEOUS
COMMUNITY
Start: 2023-08-17 | End: 2023-10-03 | Stop reason: SDUPTHER

## 2023-08-29 RX ORDER — PROMETHAZINE HYDROCHLORIDE 25 MG/1
SUPPOSITORY RECTAL
COMMUNITY
End: 2023-10-05 | Stop reason: WASHOUT

## 2023-08-29 RX ORDER — ERGOCALCIFEROL 1.25 MG/1
CAPSULE ORAL
COMMUNITY
End: 2023-10-05 | Stop reason: WASHOUT

## 2023-08-29 RX ORDER — ERENUMAB-AOOE 140 MG/ML
INJECTION, SOLUTION SUBCUTANEOUS
COMMUNITY
Start: 2023-07-21 | End: 2024-01-04 | Stop reason: WASHOUT

## 2023-08-29 RX ORDER — INDOMETHACIN 25 MG/5ML
5 SUSPENSION ORAL 3 TIMES DAILY
COMMUNITY
End: 2023-10-05 | Stop reason: WASHOUT

## 2023-08-29 RX ORDER — DICYCLOMINE HYDROCHLORIDE 20 MG/1
20 TABLET ORAL
COMMUNITY
Start: 2015-11-16 | End: 2023-10-05 | Stop reason: WASHOUT

## 2023-08-29 RX ORDER — DEXLANSOPRAZOLE 60 MG/1
1 CAPSULE, DELAYED RELEASE ORAL DAILY
COMMUNITY
Start: 2015-12-16 | End: 2023-10-05 | Stop reason: WASHOUT

## 2023-08-29 RX ORDER — CALCIUM CARBONATE 500(1250)
1 TABLET,CHEWABLE ORAL NIGHTLY
COMMUNITY
End: 2023-10-05 | Stop reason: WASHOUT

## 2023-08-29 RX ORDER — ARMODAFINIL 150 MG/1
TABLET ORAL
COMMUNITY
End: 2023-10-05

## 2023-08-29 RX ORDER — METHOCARBAMOL 750 MG/1
750 TABLET, FILM COATED ORAL 2 TIMES DAILY
COMMUNITY
End: 2023-10-05 | Stop reason: WASHOUT

## 2023-08-29 RX ORDER — LEVOTHYROXINE SODIUM 100 UG/1
CAPSULE ORAL
COMMUNITY
End: 2023-10-05 | Stop reason: WASHOUT

## 2023-08-29 RX ORDER — TRIAMCINOLONE ACETONIDE 1 MG/G
CREAM TOPICAL
COMMUNITY
Start: 2023-08-01 | End: 2023-10-30

## 2023-08-29 RX ORDER — LUBIPROSTONE 24 UG/1
24 CAPSULE ORAL 2 TIMES DAILY
COMMUNITY
End: 2023-10-05 | Stop reason: WASHOUT

## 2023-08-29 RX ORDER — ECONAZOLE NITRATE 10 MG/G
CREAM TOPICAL
COMMUNITY
End: 2023-10-05 | Stop reason: WASHOUT

## 2023-08-29 RX ORDER — NYSTATIN 500000 [USP'U]/1
TABLET, COATED ORAL AS NEEDED
COMMUNITY
Start: 2023-06-09 | End: 2023-11-14 | Stop reason: ALTCHOICE

## 2023-08-29 RX ORDER — CLINDAMYCIN HYDROCHLORIDE 300 MG/1
CAPSULE ORAL
COMMUNITY
End: 2023-10-05 | Stop reason: WASHOUT

## 2023-08-29 RX ORDER — DIVALPROEX SODIUM 250 MG/1
1 TABLET, DELAYED RELEASE ORAL 2 TIMES DAILY
COMMUNITY
End: 2023-10-05 | Stop reason: WASHOUT

## 2023-08-29 RX ORDER — LANSOPRAZOLE 30 MG/1
30 CAPSULE, DELAYED RELEASE ORAL 2 TIMES DAILY
COMMUNITY
Start: 2023-04-17 | End: 2023-10-05 | Stop reason: WASHOUT

## 2023-08-29 RX ORDER — PROCHLORPERAZINE MALEATE 5 MG
5 TABLET ORAL EVERY 6 HOURS
COMMUNITY
End: 2023-10-05 | Stop reason: WASHOUT

## 2023-08-29 RX ORDER — SYRING-NEEDL,DISP,INSUL,0.3 ML 29 G X1/2"
SYRINGE, EMPTY DISPOSABLE MISCELLANEOUS
COMMUNITY
Start: 2015-07-21 | End: 2023-10-05 | Stop reason: SDDI

## 2023-08-29 RX ORDER — HYOSCYAMINE SULFATE 0.12 MG/1
0.12 TABLET, ORALLY DISINTEGRATING ORAL EVERY 4 HOURS
COMMUNITY
Start: 2022-10-14 | End: 2023-10-05 | Stop reason: WASHOUT

## 2023-08-29 RX ORDER — HYDROCORTISONE 10 MG/1
1 TABLET ORAL 2 TIMES DAILY
COMMUNITY
Start: 2023-08-14 | End: 2024-05-17

## 2023-08-29 RX ORDER — GUAIFENESIN 1200 MG
1 TABLET, EXTENDED RELEASE 12 HR ORAL 4 TIMES DAILY
COMMUNITY

## 2023-08-29 RX ORDER — CLONAZEPAM 0.5 MG/1
1 TABLET ORAL 2 TIMES DAILY
COMMUNITY

## 2023-08-31 ENCOUNTER — HOSPITAL ENCOUNTER (OUTPATIENT)
Dept: DATA CONVERSION | Facility: HOSPITAL | Age: 48
End: 2023-08-31
Attending: RADIOLOGY | Admitting: RADIOLOGY
Payer: MEDICAID

## 2023-08-31 DIAGNOSIS — G93.2 BENIGN INTRACRANIAL HYPERTENSION: ICD-10-CM

## 2023-08-31 DIAGNOSIS — R51.9 HEADACHE, UNSPECIFIED: ICD-10-CM

## 2023-08-31 DIAGNOSIS — G43.909 MIGRAINE, UNSPECIFIED, NOT INTRACTABLE, WITHOUT STATUS MIGRAINOSUS: ICD-10-CM

## 2023-08-31 LAB
BASOPHILS/100 LEUKOCYTES IN CSF BY MANUAL COUNT: 0 %
BLASTS/100 LEUKOCYTES IN CSF BY MANUAL COUNT: 0 %
CELLS COUNTED TOTAL IN CEREBRAL SPINAL FLUID: 0
CLARITY CEREBRAL SPINAL FLUID: CLEAR
COLOR OF CEREBRAL SPINAL FLUID: COLORLESS
COLOR OF SUPERNATANT CEREBRAL SPINAL FLUID: COLORLESS
EOSINOPHILS/100 LEUKOCYTES IN CSF BY MANUAL COUNT: 0 %
ERYTHROCYTES (/UL)  IN CEREBRAL SPINAL FLUID: 105 /UL (ref 0–5)
ERYTHROCYTES (/UL)  IN CEREBRAL SPINAL FLUID: 105 /UL (ref 0–5)
ERYTHROCYTES (/UL)  IN CEREBRAL SPINAL FLUID: 6 /UL (ref 0–5)
GLUCOSE (MG/DL) IN CEREBRAL SPINAL FLUID: 71 MG/DL (ref 40–70)
GLUCOSE (MG/DL) IN CEREBRAL SPINAL FLUID: 71 MG/DL (ref 40–70)
IMMATURE GRANULOCYTES/100 LEUKOCYTES IN CSF BY MANUAL COUNT: 0 %
LEUKOCYTES (/UL) IN CEREBRAL SPINAL FLUID: 0 /UL (ref 0–5)
LYMPHOCYTES/100 LEUKOCYTES IN CSF BY MANUAL COUNT: 0 %
MONO/MACROPHAGE/100 LEUKOCYTES IN CSF BY MANUAL COUNT: 0 %
PLASMA CELLS/100 LEUKOCYTES IN CSF BY MANUAL COUNT: 0 %
PROTEIN (MG/DL) IN CSF: 91 MG/DL (ref 15–45)
PROTEIN (MG/DL) IN CSF: 91 MG/DL (ref 15–45)
SEGMENTED NEUTROPHILS/100 LEUKOCYTES IN CSF BY MANUAL COUNT: 0 %
TUBE NUMBER OF CEREBRAL SPINAL FLUID: ABNORMAL
UNCLASSIFIED CELLS/100 LEUKOCYTES CSF BY MANUAL CNT: 0 %

## 2023-09-01 ENCOUNTER — PATIENT MESSAGE (OUTPATIENT)
Dept: PRIMARY CARE | Facility: CLINIC | Age: 48
End: 2023-09-01
Payer: MEDICAID

## 2023-09-01 DIAGNOSIS — B37.31 VAGINAL MONILIASIS: Primary | ICD-10-CM

## 2023-09-01 RX ORDER — FLUCONAZOLE 100 MG/1
100 TABLET ORAL DAILY
Qty: 5 TABLET | Refills: 0 | Status: SHIPPED | OUTPATIENT
Start: 2023-09-01 | End: 2023-09-06

## 2023-09-06 RX ORDER — AMOXICILLIN AND CLAVULANATE POTASSIUM 875; 125 MG/1; MG/1
TABLET, FILM COATED ORAL
COMMUNITY
Start: 2023-09-01 | End: 2023-10-05

## 2023-09-06 RX ORDER — LORAZEPAM 1 MG/1
1 TABLET ORAL
COMMUNITY
Start: 2023-09-05 | End: 2023-10-05 | Stop reason: WASHOUT

## 2023-09-07 ENCOUNTER — APPOINTMENT (OUTPATIENT)
Dept: PRIMARY CARE | Facility: CLINIC | Age: 48
End: 2023-09-07
Payer: MEDICAID

## 2023-09-22 ENCOUNTER — APPOINTMENT (OUTPATIENT)
Dept: PRIMARY CARE | Facility: CLINIC | Age: 48
End: 2023-09-22
Payer: MEDICAID

## 2023-09-28 ENCOUNTER — APPOINTMENT (OUTPATIENT)
Dept: PRIMARY CARE | Facility: CLINIC | Age: 48
End: 2023-09-28
Payer: MEDICAID

## 2023-09-29 VITALS — HEIGHT: 62 IN | BODY MASS INDEX: 42.96 KG/M2 | WEIGHT: 233.47 LBS

## 2023-09-30 ENCOUNTER — PHARMACY VISIT (OUTPATIENT)
Dept: PHARMACY | Facility: CLINIC | Age: 48
End: 2023-09-30
Payer: MEDICAID

## 2023-09-30 PROCEDURE — RXMED WILLOW AMBULATORY MEDICATION CHARGE

## 2023-10-03 DIAGNOSIS — E11.43 TYPE 2 DIABETES MELLITUS WITH DIABETIC AUTONOMIC NEUROPATHY, WITH LONG-TERM CURRENT USE OF INSULIN (MULTI): Primary | ICD-10-CM

## 2023-10-03 DIAGNOSIS — Z79.4 TYPE 2 DIABETES MELLITUS WITH DIABETIC AUTONOMIC NEUROPATHY, WITH LONG-TERM CURRENT USE OF INSULIN (MULTI): Primary | ICD-10-CM

## 2023-10-03 RX ORDER — BLOOD-GLUCOSE,RECEIVER,CONT
1 EACH MISCELLANEOUS AS NEEDED
COMMUNITY
Start: 2023-09-29 | End: 2024-01-04 | Stop reason: WASHOUT

## 2023-10-03 RX ORDER — BLOOD-GLUCOSE SENSOR
EACH MISCELLANEOUS
COMMUNITY
End: 2023-10-04 | Stop reason: SDUPTHER

## 2023-10-04 ENCOUNTER — SPECIALTY PHARMACY (OUTPATIENT)
Dept: PHARMACY | Facility: CLINIC | Age: 48
End: 2023-10-04

## 2023-10-04 DIAGNOSIS — E11.9 TYPE 2 DIABETES MELLITUS WITHOUT COMPLICATION, UNSPECIFIED WHETHER LONG TERM INSULIN USE (MULTI): ICD-10-CM

## 2023-10-04 RX ORDER — BLOOD-GLUCOSE SENSOR
EACH MISCELLANEOUS
Qty: 9 EACH | Refills: 3 | Status: SHIPPED | OUTPATIENT
Start: 2023-10-04 | End: 2024-01-04 | Stop reason: WASHOUT

## 2023-10-04 RX ORDER — BLOOD-GLUCOSE SENSOR
EACH MISCELLANEOUS
Qty: 3 EACH | Refills: 11 | Status: SHIPPED | OUTPATIENT
Start: 2023-10-04 | End: 2023-10-05 | Stop reason: WASHOUT

## 2023-10-04 NOTE — TELEPHONE ENCOUNTER
Patient called would like to go back to Dexcom g6 sensor , everything was sent but sensors - order pended. ,    Patient also tried  to lower to 5 mg hydrocortisone , she said it didn't work she went back up to 10 mg . Patient is still having constant yeast infections , she is wondering if her increased dose of Farxiga 10 mg is causing this , she is having a infectious disease / gyn on Friday . It is extensive  spreading to her buttocks . She wants to know if there is anything natural that she can try to help with the burning and itching . She had a urine test last week and she had greater or more than 1000 mgdL glucose in her urine.

## 2023-10-05 ENCOUNTER — TELEMEDICINE (OUTPATIENT)
Dept: NEUROLOGY | Facility: CLINIC | Age: 48
End: 2023-10-05
Payer: MEDICAID

## 2023-10-05 ENCOUNTER — PHARMACY VISIT (OUTPATIENT)
Dept: PHARMACY | Facility: CLINIC | Age: 48
End: 2023-10-05
Payer: MEDICAID

## 2023-10-05 DIAGNOSIS — G43.711 CHRONIC MIGRAINE WITHOUT AURA, INTRACTABLE, WITH STATUS MIGRAINOSUS: Primary | ICD-10-CM

## 2023-10-05 PROCEDURE — 99215 OFFICE O/P EST HI 40 MIN: CPT | Performed by: PSYCHIATRY & NEUROLOGY

## 2023-10-05 PROCEDURE — RXMED WILLOW AMBULATORY MEDICATION CHARGE

## 2023-10-05 RX ORDER — FLUCONAZOLE 150 MG/1
150 TABLET ORAL ONCE
COMMUNITY
Start: 2023-09-29 | End: 2024-01-04 | Stop reason: WASHOUT

## 2023-10-05 RX ORDER — LEVOTHYROXINE SODIUM 50 UG/1
TABLET ORAL
COMMUNITY
End: 2023-10-05 | Stop reason: SDUPTHER

## 2023-10-05 RX ORDER — LEVETIRACETAM 750 MG/1
1500 TABLET ORAL 2 TIMES DAILY
COMMUNITY
End: 2023-10-05 | Stop reason: WASHOUT

## 2023-10-05 RX ORDER — TIZANIDINE HYDROCHLORIDE 2 MG/1
2 CAPSULE, GELATIN COATED ORAL 2 TIMES DAILY
COMMUNITY
End: 2023-10-05 | Stop reason: SDUPTHER

## 2023-10-05 RX ORDER — BLOOD-GLUCOSE TRANSMITTER
EACH MISCELLANEOUS
COMMUNITY
Start: 2023-09-29 | End: 2024-01-04 | Stop reason: WASHOUT

## 2023-10-05 RX ORDER — LACTOBACILLUS COMBINATION NO.4 3B CELL
CAPSULE ORAL
COMMUNITY
Start: 2018-09-20 | End: 2024-01-04 | Stop reason: WASHOUT

## 2023-10-05 RX ORDER — LACTULOSE 10 G/15ML
10 SOLUTION ORAL 2 TIMES DAILY
COMMUNITY
Start: 2023-09-22 | End: 2023-11-14 | Stop reason: ALTCHOICE

## 2023-10-05 ASSESSMENT — PATIENT HEALTH QUESTIONNAIRE - PHQ9
2. FEELING DOWN, DEPRESSED OR HOPELESS: NOT AT ALL
1. LITTLE INTEREST OR PLEASURE IN DOING THINGS: NOT AT ALL
SUM OF ALL RESPONSES TO PHQ9 QUESTIONS 1 AND 2: 0

## 2023-10-05 ASSESSMENT — ENCOUNTER SYMPTOMS
DEPRESSION: 0
LOSS OF SENSATION IN FEET: 0
OCCASIONAL FEELINGS OF UNSTEADINESS: 0

## 2023-10-05 NOTE — PROGRESS NOTES
Patient address: [address]  [facility] [address]  Patient Medications: (For current medications to populate, please use .CMED smart link)  Medication delivery date: Pt called to set delivery 10/10 for ozempic, qulipta and ubrelvy. OK w/ $0 co pay

## 2023-10-05 NOTE — PROGRESS NOTES
After LP to remove pressure at end of August, headaches decreased.   Currently Experiencing 3 migraines per month.   Treating early with Ubrelvy 100mg and diclofenac. Takes 3=-4 hours to resolve. Sometimes repeats Ubrelvy   On IVIG infusion days migraines are worse. Adds Zofran and is helpful. Will repeat Ubrelvy on these days.   Continues Aimovig monthly and Qulipta daily    Migraine is right sided. Starts in front and radiates to back.   Associated nausea, light and noise sensitivity  Triggers are IVIG, bright lights, fluorescent lights.     Sleeping well. 8 hours per night.   Experiencing a bit of depression as gets angry easily. Counseling is helping. Sees weekly.  Working on DBT strategies.     Saw rheumatologist. Still doing tests to RO ALS. Has done xrays, ultrasounds and more labs.       Subjective     Chief Complaint: Headache    Jonathan Ayala is a 47 y.o. year old female who presents with chief complaint of headaches.    HPI    ROS: As per HPI, otherwise all other systems have been reviewed are negative for complaint.     Review of Systems    Past Medical History:   Diagnosis Date    Abnormal findings on diagnostic imaging of other abdominal regions, including retroperitoneum 10/14/2020    Abnormal CT of the abdomen    Acquired deformity of nose 03/24/2022    Nasal deformity    Acute upper respiratory infection, unspecified 10/16/2019    Acute URI    Allergy status to unspecified drugs, medicaments and biological substances 05/22/2020    History of drug allergy    Allergy status to unspecified drugs, medicaments and biological substances 11/13/2020    History of adverse drug reaction    Benign intracranial hypertension 01/27/2022    Pseudotumor cerebri    Bipolar disorder, unspecified (CMS/HCC)     Bipolar disease, chronic    Breast calcification, right 08/21/2018    Cellulitis of abdominal wall 09/28/2022    Cellulitis of right abdominal wall    Cervicalgia 07/01/2020    Cervicalgia of  ylksqrbi-irbfswy-obtho region    Chronic maxillary sinusitis 01/04/2022    Chronic maxillary sinusitis    Chronic sialoadenitis 03/16/2020    Chronic sialoadenitis    COVID-19 01/06/2022    COVID-19 with multiple comorbidities    Decreased white blood cell count, unspecified 11/04/2019    Leukopenia    Disturbances of salivary secretion 03/16/2020    Xerostomia    Dry eye syndrome of bilateral lacrimal glands 10/07/2022    Dry eyes, bilateral    Encounter for preprocedural cardiovascular examination 02/01/2022    Preoperative cardiovascular examination    Food additives allergy status 06/11/2020    Allergy to food dye    Fracture of nasal bones, initial encounter for closed fracture 03/03/2022    Closed fracture of nasal bone, initial encounter    Granuloma of right orbit 10/07/2021    Inflammatory pseudotumor of right orbit    History of endometrial ablation 11/09/2017    Localized swelling, mass and lump, head 03/24/2022    Swollen nose    Major depressive disorder, recurrent, in full remission (CMS/HCC) 10/07/2021    Depression, major, recurrent, in complete remission    Mammary duct ectasia of left breast 08/24/2022    Periductal mastitis of left breast    Nipple discharge 08/24/2022    Bloody discharge from left nipple    Ocular pain, right eye 10/07/2022    Pain in right eye    Other abnormal and inconclusive findings on diagnostic imaging of breast 07/06/2020    Other abnormal and inconclusive findings on diagnostic imaging of breast    Other anomalies of pupillary function 05/31/2019    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    Other chest pain 05/18/2020    Chest discomfort    Other conditions influencing health status 08/01/2022    History of cough    Other conditions influencing health status 08/03/2021    Chronic migraine    Other specified disorders of eustachian tube, left ear 11/18/2019    ETD (Eustachian tube dysfunction), left    Other specified disorders of nose and nasal sinuses 03/24/2022     Nasal dryness    Other specified disorders of nose and nasal sinuses 03/24/2022    Nasal crusting    Pelvic and perineal pain 07/06/2020    Pelvic pain    Personal history of other diseases of the circulatory system 04/07/2020    History of sinus tachycardia    Personal history of other diseases of the circulatory system 04/07/2020    History of abnormal electrocardiography    Personal history of other diseases of the circulatory system     History of Raynaud's syndrome    Personal history of other diseases of the digestive system     History of irritable bowel syndrome    Personal history of other diseases of the digestive system 03/02/2020    History of oral pain    Personal history of other diseases of the musculoskeletal system and connective tissue 01/19/2022    History of neck pain    Personal history of other diseases of the musculoskeletal system and connective tissue 03/02/2021    History of scleroderma    Personal history of other diseases of the musculoskeletal system and connective tissue 06/16/2020    History of muscle weakness    Personal history of other diseases of the nervous system and sense organs 11/18/2019    History of hearing loss    Personal history of other diseases of the nervous system and sense organs 09/21/2022    History of partial seizures    Personal history of other diseases of the respiratory system 04/14/2021    History of asthma    Personal history of other endocrine, nutritional and metabolic disease 02/17/2021    History of diabetes mellitus    Personal history of other mental and behavioral disorders 05/27/2021    History of anxiety    Personal history of other specified conditions 09/07/2022    History of nipple discharge    Personal history of other specified conditions 10/16/2019    History of headache    Personal history of other specified conditions 09/28/2022    History of lump of left breast    Personal history of other specified conditions 09/16/2021    History of  persistent cough    Personal history of other specified conditions 02/01/2022    History of palpitations    Personal history of other specified conditions 03/09/2022    History of headache    Personal history of other specified conditions 02/12/2014    History of chest pain    Personal history of other specified conditions 10/27/2021    History of nausea and vomiting    Personal history of other specified conditions 10/16/2019    History of fatigue    Personal history of other specified conditions 02/26/2021    History of orthopnea    Personal history of other specified conditions 02/22/2021    History of shortness of breath    Personal history of urinary calculi     H/O renal calculi    Postural orthostatic tachycardia syndrome (POTS)     POTS (postural orthostatic tachycardia syndrome)    Rash and other nonspecific skin eruption 03/15/2022    Rash    Repeated falls 06/23/2021    Recurrent falls    Right lower quadrant pain 10/14/2020    Abdominal pain, RLQ (right lower quadrant)    Sjogren syndrome, unspecified (CMS/HCC)     History of Sjogren's disease    Slow transit constipation 07/09/2020    Slow transit constipation    Subarachnoid hemorrhage, traumatic (CMS/HCC) 04/19/2023    Traumatic subarachnoid hemorrhage with loss of consciousness of unspecified duration, subsequent encounter 03/15/2022    Subarachnoid hemorrhage following injury, with loss of consciousness, subsequent encounter    Traumatic subarachnoid hemorrhage without loss of consciousness, subsequent encounter     Subarachnoid hemorrhage following injury, no loss of consciousness, subsequent encounter    Unspecified disorder of refraction 10/07/2022    Refractive error    Unspecified optic neuritis 11/06/2020    Right optic neuritis    Unspecified optic neuritis 11/06/2020    Optic neuritis, right    Unspecified visual loss 09/25/2019    Vision loss    Venous insufficiency (chronic) (peripheral) 10/18/2021    Chronic venous insufficiency of lower  extremity    Viral infection, unspecified 01/11/2022    Nonspecific syndrome suggestive of viral illness    Vitamin D deficiency, unspecified 09/28/2022    Vitamin D deficiency     Past Surgical History:   Procedure Laterality Date    CT HEAD ANGIO W AND WO IV CONTRAST  7/28/2021    CT HEAD ANGIO W AND WO IV CONTRAST 7/28/2021    CT HEAD ANGIO W AND WO IV CONTRAST  7/15/2021    CT HEAD ANGIO W AND WO IV CONTRAST 7/15/2021    CT HEAD ANGIO W AND WO IV CONTRAST  8/27/2020    CT HEAD ANGIO W AND WO IV CONTRAST 8/27/2020    CT NECK ANGIO W AND WO IV CONTRAST  7/28/2021    CT NECK ANGIO W AND WO IV CONTRAST 7/28/2021    CT NECK ANGIO W AND WO IV CONTRAST  8/27/2020    CT NECK ANGIO W AND WO IV CONTRAST 8/27/2020    MR HEAD ANGIO WO IV CONTRAST  2/8/2021    MR HEAD ANGIO WO IV CONTRAST 2/8/2021 [facility] CLINICAL LEGACY    MR NECK ANGIO WO IV CONTRAST  2/8/2021    MR NECK ANGIO WO IV CONTRAST 2/8/2021 [facility] CL[facility]ICAL [facility]    OTHER SURGICAL HISTORY  08/22/2019    Carpal tunnel surgery    OTHER SURGICAL HISTORY  08/22/2019    Hysterectomy    OTHER SURGICAL HISTORY  08/22/2019    Venous access port placement    OTHER SURGICAL HISTORY  08/22/2019    Cholecystectomy    OTHER SURGICAL HISTORY  08/22/2019    Appendectomy    OTHER SURGICAL HISTORY  08/22/2019    Pyloroplasty     Family History   Problem Relation Name Age of Onset    Other (Perforated bowel) Mother      Hyperlipidemia Father      Hypertension Father      Heart attack Father      Other (S/P CABG) Father      Stroke Sister      Breast cancer Sister      Lupus Sister      Hyperlipidemia Brother      Hypertension Brother      Blindness Other Multiple     Melanoma Other      Asthma Other      Other (hay fever) Other      Allergies Other       Social History     Tobacco Use    Smoking status: Never    Smokeless tobacco: Never   Substance Use Topics    Alcohol use: Not Currently        Objective   There were no vitals taken for this visit.    Neuro Exam:  Cardiac Exam: No  apparent edema of b/l lower extremities  Neurological Exam:  MENTAL STATUS:   General Appearance: No distress, alert, interactive, and cooperative. Orientation was normal to time, place and person. Recent and remote memory was intact.     OPHTHALMOSCOPIC:   The ophthalmoscopic exam was normal. The fundi were well visualized with normal disc margins, clear vessels and vascular pulsations. No disc edema. The cup/disk ratio was not enlarged. No hemorrhages or exudates were present in the posterior segments that were visualized.     CRANIAL NERVES:   CN 2         Visual fields full to confrontation.   CN 3, 4, 6   Pupils round, 4 mm in diameter, equally reactive to light. Lids symmetric; no ptosis. EOMs normal alignment, full range with normal saccades, pursuit and convergence.   No nystagmus.   CN 5   Facial sensation intact bilaterally.   CN 7   Normal and symmetric facial strength. Nasolabial folds symmetric.   CN 8   Hearing intact to conversation and finger rub.  CN 9, 10   Palate elevates symmetrically.  CN 11   Normal strength of shoulder shrug and neck turning.   CN 12   Tongue midline, with normal bulk and strength; no fasciculations.     MOTOR:   Muscle bulk and tone were normal in both upper and lower extremities.   No pronator drift bilaterally.  No fasciculations, tremor or other abnormal movements evident with the patient examined clothed.    STRENGTH:  R  L  Deltoid            5          5  Biceps  5 5  Triceps  5 5    Hip flexion 5 5  Knee Flex 5 5  Knee Ex 5 5    REFLEXES: R L  Biceps  2 2                     Triceps  2 2  Patellar  2 2     SENSORY:   In both upper and lower extremities, sensation was intact to light touch.    COORDINATION:   In both upper extremities, finger-nose-finger was intact without dysmetria or overshoot.     GAIT:   Station was stable with a normal base. Gait was stable with a normal arm swing and speed. No ataxia, shuffling, steppage or waddling was present. No circumduction  was present. No Romberg sign was present.    Neurological Exam  Physical Exam    Results                                            Assessment/Plan

## 2023-10-09 ASSESSMENT — CUP TO DISC RATIO
OD_RATIO: 0.15
OS_RATIO: 0.15

## 2023-10-09 ASSESSMENT — EXTERNAL EXAM - LEFT EYE: OS_EXAM: NORMAL

## 2023-10-09 ASSESSMENT — EXTERNAL EXAM - RIGHT EYE: OD_EXAM: NORMAL

## 2023-10-09 ASSESSMENT — SLIT LAMP EXAM - LIDS
COMMENTS: NORMAL
COMMENTS: NORMAL

## 2023-10-10 ENCOUNTER — OFFICE VISIT (OUTPATIENT)
Dept: OPHTHALMOLOGY | Facility: CLINIC | Age: 48
End: 2023-10-10
Payer: MEDICAID

## 2023-10-10 ENCOUNTER — SPECIALTY PHARMACY (OUTPATIENT)
Dept: PHARMACY | Facility: CLINIC | Age: 48
End: 2023-10-10

## 2023-10-10 DIAGNOSIS — G93.2 BENIGN INTRACRANIAL HYPERTENSION: Primary | ICD-10-CM

## 2023-10-10 NOTE — PROGRESS NOTES
"Assessment and Plan    06/08/2021 +OKN response OD  09/30/2019 +OKN response OD    12/01/2023 MRI brain & orbits with contrast, which I personally reviewed, shows right frontal encephalomalacia consistent with prior bolt.  Interval head imaging.    10/05/2022 CT head without contrast & CTA head & neck, by report from [facility], show \" 1.   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  Overlying right-sided sherley hole.)  2.  Prominence of the ventricles and sulci for age.  \" and \"1. Similar appearing old areas of infarction involving the right and left frontal lobes extending to the convexities with underlying encephalomalacia at site of prior hemorrhage from 02/13/2022. Overlying right-sided sherley hole. Prominence of the ventricles and sulci for age. No evidence of acute infarction. No active hemorrhage. 2. No significant stenosis internal carotid arteries. 3. No significant stenosis of the intracranial vessels or evidence of aneurysm. 4. Aberrant right subclavian artery behind the esophagus with no dilation of the proximal esophagus.\"  09/25/2022 CT head without contrast, which I personally reviewed previously, shows no lesion.  04/20/2022 CT head without contrast, which I personally reviewed previously, shows right frontal encephalomalacia judged stable by Radiology.  Interval head imaging.  09/10/2021 MRI brain with contrast, which I personally reviewed previously, shows no lesion.  09/09/2021 CTA head & neck, which I personally reviewed previously, shows no lesion.  09/09/2021 CT head without contrast, which I personally reviewed previously, shows no lesion.  08/11/2021 MRI brain & orbits with contrast & MRV head, which I personally reviewed previously, show left optic atrophy with Radiology noting “Punctate focus of enhancement seen along the left 7th-8th " cranial nerve bundle at the level of the porus acusticus, indeterminate. Otherwise unremarkable MRI of the brain.”  Interval head imaging.  06/04/2021 MRI brain & orbits with contrast, which I personally reviewed previously, shows left optic atrophy.  Interval head imaging.  06/29/2017 MRI brain without contrast, which I personally reviewed previously, shows no lesion.  11/22/2007 CT head without contrast, which I personally reviewed previously, shows no lesion.    08/11/2021 lumbar puncture opening pressure 18 cm water, tube 1: RBC 0, WBC 16 (86% L, 13% M), tube 4: RBC 0 & WBC 16 (92% L, 8% M), protein 71 & glucose 61. Cytology negative. Flow cytometry negative. Oligoclonal bands 0. IgG studies with high CSF IgG & albumin.  08/05/2020 lumbar puncture opening pressure 20 cm water, protein 68, glucose 85. MBP wnl. Oligoclonal bands POSITIVE 4.  12/26/2019 lumbar puncture no opening pressure checked per patient) tube ?: RBC 39, WBC 6 (91% L, 9% M), tube ?: RBC 38, WBC 5, protein 89, glucose 79. (via [employer] from [employer])  11/13/2019 lumbar puncture opening pressure 22 cm water, tube 1: RBC 9, WBC 4, tube 4: RBC 1 & WBC 5, protein 82 & glucose 61. Oligoclonal bands 0. IgG studies with non-specific abnormalities. HSV PCR negative.  09/20/2019 lumbar puncture opening pressure 20 cm water, RBC 1, WBC 7 (96% L, 4% M), protein 94 & glucose 78.  01/31/2015 lumbar puncture RBC 2, WBC 0, protein 104 & glucose 74.    07/27/2019 multifocal ERG with mildly reduced central responses.  Pattern VEP with OD borderline latency at 0.25 degrees and OS with severely prolonged latencies and decreased amplitudes.    Lab Results   Component Value Date/Time    SEDRATE 19 08/01/2023 1558    SEDRATE 33 (H) 08/07/2022 0322    SEDRATE 19 05/25/2022 1501    SEDRATE 3 06/05/2020 1110    SEDRATE 6 05/13/2020 1529    SEDRATE 3 03/28/2020 0629    SEDRATE 5 09/16/2019 0507    SEDRATE 8 08/19/2019 1314    CRP 0.90 08/07/2022 0322    CRP 0.35  05/25/2022 1501    CRP 0.34 09/23/2021 0618    CRP 0.71 09/21/2021 0648    CRP 0.14 07/16/2020 0944    CRP 0.10 06/05/2020 1110    CRP <0.10 05/13/2020 1529    CRP <0.10 03/28/2020 0629    CRP 3.64 (A) 11/21/2019 2157    CRP <0.10 08/19/2019 1314      Lab Results   Component Value Date/Time    JKKYDVRA80 299 02/23/2023 0941    SAPILYNY95 709 02/09/2021 0451    DNVSAYWQ06 508 12/10/2019 1349    RCFOL 951 12/10/2019 1349      Lab Results   Component Value Date/Time    NMOAQP4 Negative 11/14/2019 1447    MOGFACS Negative 11/10/2020 1000    MOGFACS Negative 11/14/2019 1447    ACE 26 11/10/2020 1000      Tuberculosis studies  Lab Results   Component Value Date/Time    TBSIN Negative 09/22/2021 0430    TBSIN Negative 11/10/2020 1000    TBSIN Negative 11/14/2019 0531        09/18/2020 ESR 1. CRP < 0.08 mg/dL (0.8 mg/L).  08/28/2020 HbA1c HIGH 7.0. Lipid panel with LDL 64.  08/05/2020 B12 462. Folate wnl. AQP4 ab negative.  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 Suarez.  07/09/2019 BRETT > 1:640. Anti-centromere HIGH 101 (0-40). scl-70 negative. Chromatin ab IgG, anti-ribosomal ab, anti-Sarahy ab, anti-DNA ab negative.    10/10/2023 OCT RNFL OD *** & OS ***.  02/06/2023 OCT RNFL OD 92 & OS 38. (stable)  OCT macula OD normal foveal contour 255 & OS thinning RNFL miscentered wrt fovea.  10/07/2022 OCT RNFL OD 92 & OS 40. (decreased OD & stable OS)  09/23/2022 OCT RNFL OD 98 & OS 38. (increased OD & stable OS)  01/27/2022 OCT RNFL OD 88 & OS 37. (stable)  10/06/2021 OCT RNFL OD 93 & OS 39 (stable)..  08/19/2021 OCT RNFL OD 92 & OS 38. (stable)  06/08/2021 OCT RNFL OD 87 & OS 37. (stable)  01/28/2021 OCT RNFL OD 85 & OS 37. (stable)  11/06/2020 OCT RNFL OD 89 & OS 39. (stable)  07/27/2020 OCT RNFL OD 89 & OS 38. (stable)  01/07/2020 OCT RNFL OD 93 & OS 38. (stable to mild increase OD, stable to mild decrease OS)  11/27/2019 OCT RNFL OD 84 & OS 42. (stable to mild OD decrease)  09/30/2019 OCT RNFL OD 89  & OS 41. (stable)  07/18/2019 OCT RNFL OD 90 & OS 39.  OCT macula OD normal foveal contour 256 & OS thinning of RNFL & GCL, but preserved foveal contour 238.    10/10/2023 OCT RNFL OD *** & OS ***.  02/06/2023 HVF 24-2 OD fovea 36, FL 1/17, FP 0%< FN 15%, scatter MD -7.58 & OS stimulus V, fovea 19, generalized depression.  10/07/2022 HVF 24-2 OD fovea 35, wnl MD -1.26 & OS fovea 5, generalized depression MD -30.62.  09/23/2022 HVF 24-2 OD fovea 38, scatter MD -2.67 & OS stimulus V, fovea 16, superior arcuate & inferior arcuate.  01/27/2022 HVF 24-2 OD fovea 36, wnl MD -1.67 & OS stimulus V, fovea 28, generalized reduction.  10/06/2021 HVF 24-2 OD fovea 36, scatter MD -4.76 & OS stimulus V, generalized reduction.  08/19/2021 HVF 24-2 OD fovea 39, wnl MD -2.31 & OS stimulus V, fovea 28, generalized depression.  06/08/2021 HVF Stimulus V OD fovea 34, wnl & OS stimulus V, fovea 24, generalized depression.  01/28/2021 HVF 24-2 OD fovea 40, wnl MD -0.63 & OS stimulus V, fovea 28, superior nasal step & inferior arcuate.  11/06/2020 HVF 24-2 OD fovea 32, possible inferior > superior arcuate MD -14.71 & OS stimulus V, fovea 10, generalized depression.  07/27/2020 HVF 24-2 OD fovea 39, wnl MD -2.45 & OS stimulus V, fovea 27, superior & inferior altitudinal.    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    Right eye vision is much better today with pinhole than at her recent ED visit and now is similar to other recent visits. The left eye remains poor. I think again there is a component of refractive error. The left eye shows similar optic atrophy. She has had other episodes of right  eye vision loss that recovered, and I think this one is similar are more consistent with a functional origin.    I still recommend artificial tears and regular refractive rare.    Plan    Artificial tears.  Weight loss and blood sugar regulation.  Continue with Dr. Bai.  Vasculopathic risk factor control.  Hold acetazolamide.    Follow up as previously planned in 3-6 months with HVF & OCT. (dilated 02/06/2023)

## 2023-10-12 DIAGNOSIS — E11.9 CONTROLLED TYPE 2 DIABETES MELLITUS WITHOUT COMPLICATION, UNSPECIFIED WHETHER LONG TERM INSULIN USE (MULTI): ICD-10-CM

## 2023-10-12 RX ORDER — DAPAGLIFLOZIN 5 MG/1
5 TABLET, FILM COATED ORAL
Qty: 90 TABLET | Refills: 3 | Status: SHIPPED | OUTPATIENT
Start: 2023-10-12 | End: 2024-04-04 | Stop reason: DRUGHIGH

## 2023-10-12 NOTE — TELEPHONE ENCOUNTER
Patient asking for refill of Farixga 5 mg tablets , she has been cutting 10 mg tablets since dose decrease but it is  not accurate .order pended . Geovanna Evangelista LPN

## 2023-10-13 ENCOUNTER — TELEPHONE (OUTPATIENT)
Dept: ENDOCRINOLOGY | Facility: CLINIC | Age: 48
End: 2023-10-13
Payer: MEDICAID

## 2023-10-13 ENCOUNTER — PATIENT MESSAGE (OUTPATIENT)
Dept: ENDOCRINOLOGY | Facility: CLINIC | Age: 48
End: 2023-10-13
Payer: MEDICAID

## 2023-10-13 DIAGNOSIS — Z79.4 TYPE 2 DIABETES MELLITUS WITH DIABETIC AUTONOMIC NEUROPATHY, WITH LONG-TERM CURRENT USE OF INSULIN (MULTI): Primary | ICD-10-CM

## 2023-10-13 DIAGNOSIS — E11.43 TYPE 2 DIABETES MELLITUS WITH DIABETIC AUTONOMIC NEUROPATHY, WITH LONG-TERM CURRENT USE OF INSULIN (MULTI): Primary | ICD-10-CM

## 2023-10-13 RX ORDER — SEMAGLUTIDE 1.34 MG/ML
1 INJECTION, SOLUTION SUBCUTANEOUS
Qty: 3 ML | Refills: 5 | Status: SHIPPED | OUTPATIENT
Start: 2023-10-13 | End: 2023-12-08 | Stop reason: DRUGHIGH

## 2023-10-13 NOTE — TELEPHONE ENCOUNTER
Regarding: Ozempic question   Contact: [phone]  ----- Message from Maria Del Rosario Quintanilla sent at 10/13/2023 12:31 PM EDT -----       ----- Message from Jonathan Ayala to Daja Vergara, RAUL-CNP sent at 10/13/2023 11:49 AM -----   I left a message on "[employer]’s voicemail about  specialty Pharmacy and I are both confused. Last virtual with Dr Felix she did cut my Farxiga from 10 mg to 5 mg. But she didn’t mention anything about stopping Ozempic. The  special[facility] were going to [facility] from [facility] to them they got a refusal to fill from Drs office. They wasn’t sure if it was a mistake or not. I can’t message Dr Felix yet with the change with this MyChart. But I saw your name so I figured I’d message you cause I’m supposed to take it today. I have enough to take today. But if you could figure out if I’m still supposed to be taking Ozempic still it would be greatly appreciated. Thank you. Cause I know it’s really busy there with trying to learn a new system and still take care of your regular duties. Again thank you. Jonathan Ayala

## 2023-10-13 NOTE — TELEPHONE ENCOUNTER
Spoke to pharmacy confirming [facility] script for Jonathan Ayala. Geovanna Evangelista LPN

## 2023-10-15 ENCOUNTER — PHARMACY VISIT (OUTPATIENT)
Dept: PHARMACY | Facility: CLINIC | Age: 48
End: 2023-10-15
Payer: MEDICAID

## 2023-10-15 PROCEDURE — RXMED WILLOW AMBULATORY MEDICATION CHARGE

## 2023-10-16 ENCOUNTER — SPECIALTY PHARMACY (OUTPATIENT)
Dept: PHARMACY | Facility: CLINIC | Age: 48
End: 2023-10-16

## 2023-10-16 ENCOUNTER — PHARMACY VISIT (OUTPATIENT)
Dept: PHARMACY | Facility: CLINIC | Age: 48
End: 2023-10-16
Payer: MEDICAID

## 2023-10-16 PROCEDURE — RXMED WILLOW AMBULATORY MEDICATION CHARGE

## 2023-10-17 ENCOUNTER — PHARMACY VISIT (OUTPATIENT)
Dept: PHARMACY | Facility: CLINIC | Age: 48
End: 2023-10-17

## 2023-10-17 ENCOUNTER — SPECIALTY PHARMACY (OUTPATIENT)
Dept: PHARMACY | Facility: CLINIC | Age: 48
End: 2023-10-17

## 2023-10-19 ENCOUNTER — TELEPHONE (OUTPATIENT)
Dept: ENDOCRINOLOGY | Facility: CLINIC | Age: 48
End: 2023-10-19
Payer: MEDICAID

## 2023-11-07 ENCOUNTER — SPECIALTY PHARMACY (OUTPATIENT)
Dept: PHARMACY | Facility: CLINIC | Age: 48
End: 2023-11-07

## 2023-11-07 NOTE — PROGRESS NOTES
[facility] Clinical Note    Jonathan Ayala is a 47 y.o. female, who is on the specialty pharmacy service for management of: Migraine Core with status of: (Enrolled)     Jonathan was contacted on 11/7/2023.    Refer to the encounter summary report for documentation details about patient counseling and education.      Medication Adherence  The importance of adherence was discussed with the patient and they were advised to take the medication as prescribed by their provider. Jonathan was encouraged to call her physician's office if they have a question regarding a missed dose.     Conclusion  Rate your quality of life on scale of 1-10: 8  Rate your satisfaction with  Specialty Pharmacy on scale of 1-10: 9      Patient advised to contact the pharmacy if there are any changes to her medication list, including prescriptions, OTC medications, herbal products, or supplements. Patient was advised of [facility]’s dispensing process, refill timeline, contact information ([phone]), and patient management follow up. Patient confirmed understanding of education conducted during assessment. All patient questions and concerns were addressed to the best of my ability. Patient was encouraged to contact the specialty pharmacy with any questions or concerns.    Confirmed follow-up outreaches are properly scheduled. Reviewed goals of therapy in the program targets.    Chapito Chatman, LoydaD

## 2023-11-09 ENCOUNTER — PHARMACY VISIT (OUTPATIENT)
Dept: PHARMACY | Facility: CLINIC | Age: 48
End: 2023-11-09
Payer: MEDICAID

## 2023-11-09 PROCEDURE — RXMED WILLOW AMBULATORY MEDICATION CHARGE

## 2023-11-10 ENCOUNTER — PATIENT OUTREACH (OUTPATIENT)
Dept: PRIMARY CARE | Facility: CLINIC | Age: 48
End: 2023-11-10
Payer: MEDICAID

## 2023-11-10 ENCOUNTER — PHARMACY VISIT (OUTPATIENT)
Dept: PHARMACY | Facility: CLINIC | Age: 48
End: 2023-11-10
Payer: MEDICAID

## 2023-11-10 PROCEDURE — RXMED WILLOW AMBULATORY MEDICATION CHARGE

## 2023-11-10 NOTE — PROGRESS NOTES
Discharge Facility: [facility]  Discharge Diagnosis: Left Sided Weakness  Admission Date: 7 Nov 23  Discharge Date: 9 Nov 23    PCP Appointment Date: 14 Nov 23  Specialist Appointment Date: 14 Nov 23 (Neurology)  Hospital Encounter and Summary: Linked    See discharge assessment below for further details     Engagement  Call Start Time: 0927 (11/10/2023  9:37 AM)    Medications  Medications reviewed with patient/caregiver?: Yes (11/10/2023  9:37 AM)  Is the patient having any side effects they believe may be caused by any medication additions or changes?: No (11/10/2023  9:37 AM)  Does the patient have all medications ordered at discharge?: Yes (11/10/2023  9:37 AM)  Prescription Comments: None (11/10/2023  9:37 AM)  Is the patient taking all medications as directed (includes completed medication regime)?: Yes (11/10/2023  9:37 AM)  Medication Comments: Pt stating that she has no questions, concerns, or issues with medications at this time. (11/10/2023  9:37 AM)    Appointments  Does the patient have a primary care provider?: Yes (pt made follow up appt for 14 Nov 23) (11/10/2023  9:37 AM)  Care Management Interventions: Verified appointment date/time/provider (11/10/2023  9:37 AM)  Has the patient kept scheduled appointments due by today?: Yes (11/10/2023  9:37 AM)  Care Management Interventions: Advised patient to keep appointment (11/10/2023  9:37 AM)    Self Management  What is the home health agency?: N/A (11/10/2023  9:37 AM)  Has home health visited the patient within 72 hours of discharge?: Not applicable (11/10/2023  9:37 AM)  What Durable Medical Equipment (DME) was ordered?: N/A (11/10/2023  9:37 AM)    Patient Teaching  Does the patient have access to their discharge instructions?: Yes (11/10/2023  9:37 AM)  Care Management Interventions: Reviewed instructions with patient (11/10/2023  9:37 AM)  What is the patient's perception of their health status since discharge?: Improving (11/10/2023  9:37  AM)  Is the patient/caregiver able to teach back the hierarchy of who to call/visit for symptoms/problems? PCP, Specialist, Home Health nurse, Urgent Care, ED, 911: Yes (11/10/2023  9:37 AM)  Patient/Caregiver Education Comments: None (11/10/2023  9:37 AM)    Wrap Up  Wrap Up Additional Comments: None (11/10/2023  9:37 AM)  Call End Time: 0937 (11/10/2023  9:37 AM)     Pt grateful for outreach call and is agreeable to being contacted after PCP appt to see how she is doing.

## 2023-11-14 ENCOUNTER — APPOINTMENT (OUTPATIENT)
Dept: PRIMARY CARE | Facility: CLINIC | Age: 48
End: 2023-11-14
Payer: MEDICAID

## 2023-11-14 ENCOUNTER — APPOINTMENT (OUTPATIENT)
Dept: NEUROLOGY | Facility: CLINIC | Age: 48
End: 2023-11-14
Payer: MEDICAID

## 2023-11-14 ENCOUNTER — OFFICE VISIT (OUTPATIENT)
Dept: NEUROLOGY | Facility: CLINIC | Age: 48
End: 2023-11-14
Payer: MEDICAID

## 2023-11-14 VITALS
DIASTOLIC BLOOD PRESSURE: 92 MMHG | BODY MASS INDEX: 44.35 KG/M2 | WEIGHT: 241 LBS | HEART RATE: 97 BPM | SYSTOLIC BLOOD PRESSURE: 146 MMHG | HEIGHT: 62 IN

## 2023-11-14 DIAGNOSIS — R56.9 FOCAL SEIZURES (MULTI): ICD-10-CM

## 2023-11-14 DIAGNOSIS — G43.109 COMPLICATED MIGRAINE: Primary | ICD-10-CM

## 2023-11-14 PROCEDURE — 3077F SYST BP >= 140 MM HG: CPT

## 2023-11-14 PROCEDURE — 1036F TOBACCO NON-USER: CPT

## 2023-11-14 PROCEDURE — 3080F DIAST BP >= 90 MM HG: CPT

## 2023-11-14 PROCEDURE — 3008F BODY MASS INDEX DOCD: CPT

## 2023-11-14 PROCEDURE — 99215 OFFICE O/P EST HI 40 MIN: CPT

## 2023-11-14 PROCEDURE — 3051F HG A1C>EQUAL 7.0%<8.0%: CPT

## 2023-11-14 RX ORDER — ATOGEPANT 60 MG/1
60 TABLET ORAL DAILY
Qty: 30 TABLET | Refills: 11 | Status: SHIPPED | OUTPATIENT
Start: 2023-11-14

## 2023-11-14 RX ORDER — LEVETIRACETAM 750 MG/1
1500 TABLET ORAL 2 TIMES DAILY
COMMUNITY

## 2023-11-14 RX ORDER — AMLODIPINE BESYLATE 5 MG/1
5 TABLET ORAL DAILY
COMMUNITY
Start: 2023-10-12 | End: 2024-10-11

## 2023-11-14 NOTE — PATIENT INSTRUCTIONS
Continue qulipta and aimovig for prevention. Qulipta resent to  speciality pharmacy to restart.   Continue Ubrelvy and diclofenac for rescue.   Instructed to call me or Dr. Bai with any problems in the meantime, otherwise keep f/u with Dr. Bai in February.    Suggestions for preventing or controlling migraines:  - Riboflavin (vitamin B2) 200 mg twice a day and magnesium (oxelate) 250-300 mg daily for prevention  - CoQ10 100 mg daily increasing to 400 mg daily may also be helpful  - avoid triggers that can cause or worsen migraines (food, lack of sleep, stress, etc.)  - keep a diary of your headaches to note how often you get them, how long they last, and any other helpful information   - avoid taking abortive medication (NOT preventative medication) more than 3 days a week (includes both prescription and over the counter meds)  - take your preventative medication as directed. Let me know if you have side effects or problems with the medication. Do not suddenly stop the medication.  - melatonin 3 mg at night can help with sleep and headaches.

## 2023-11-14 NOTE — PROGRESS NOTES
"Subjective     Jonathan Ayala is a  47 y.o. female with a past medical history of DM II, hemiplegic migraines, hyopthyroidism, adrenal insufficiency, CVID, HTN, seizures s/p shunt trial, IIH w/ annual Lps who presents with No chief complaint on file.  . .    Visit type: follow up visit    HPI   Pt was last seen by Dr. Bai virtually 10/5/23. Per her note, pt had LP at the end of August and headaches decreased. She was currently having 3 migraines per month txing with Ubrelvy 100 mg and diclofenac. Does get IVIG infusions every 4 weeks until July 2024 for CVID (common variable immunodeficiency). Dr. Bai recommended continuing Aimovig and Qulipta and repeating Ubrelvy on infusion days.     Pt presents today for follow up after hospitalization at [facility] on 11/7 for hemiplegic migraine vs. Stroke. She messaged Dr. Bai to let her know that she had gotten her infusion, then had a headache and took her Ubrelvy and Diclofenac and promethazine. She went to sleep at 930 pm and woke up at 3 am with\"worst migraine ever\" and left side heaviness. Pt was also inquiring about resuming Qulipta with Aimovig. She follows with Dr. Mederos as well, last on 10/10/23. At that time, he felt that the left eye was still poor and there may be a component of refractive error, showed optic atropgy. She has also had other episodes of R eye vision loss which recovered and he felt those were more c/w functional origin. Recommended artificial tears, weight loss, blood sugary reducation, vascular risk factor reduction, and holding acetazolamide.     At [facility], CTA head and neck were unremarkable. CT head showed nothing acute but did show focal right frontal lobe encephalopmalacia deep to a sherley hole. MRI brain was negative for stroke. Pt recieved IV decadron, gabapentin, and IV mag and improved greatly. Was discharged with diagnosis of complicated migraine and recommended to continue aimovig and propranolol. Gabapentin was also continued. " "    Today she states that she was at her IVIG session at 1 pm and this time was done by 5 pm when usually it takes 6-8 hrs. Pretreated wtih Ubrelvy as normal for her and took a second one afterward. No headache during or even when she went to sleep. Then she had  this migraine which woke her up from sleep. She states she could bear weight at first and had numbness in the left side typical for her hemiplegic migraines but then by the time she got to the ED she couldn't bear any weight or feel anything. It took 36 hours for her migraine to break. Also tells me the morning before IVIG she had an episode of bowel incontinence and then her vision \"flipped upside down\" for a minute or two. States this has not happened before. States it was liquid BM and did not realize she had to go to the bathroom at all. She does have urinary stress incontinence. She feels completely back to herself today.     Taking her Keppra and Vimpat. States seizures are under good control. Last episode in July.   Going through counseling. Recently punched a wall because she couldnt control her anger.     Review of Systems  10 point review of systems performed and is negative except as noted in the HPI.     All other systems have been reviewed and are negative for complaint.    Past Medical History:   Diagnosis Date    Abnormal findings on diagnostic imaging of other abdominal regions, including retroperitoneum 10/14/2020    Abnormal CT of the abdomen    Acquired deformity of nose 03/24/2022    Nasal deformity    Acute upper respiratory infection, unspecified 10/16/2019    Acute URI    Allergy status to unspecified drugs, medicaments and biological substances 05/22/2020    History of drug allergy    Allergy status to unspecified drugs, medicaments and biological substances 11/13/2020    History of adverse drug reaction    Benign intracranial hypertension 01/27/2022    Pseudotumor cerebri    Bipolar disorder, unspecified (CMS/HCC)     Bipolar " disease, chronic    Breast calcification, right 08/21/2018    Cellulitis of abdominal wall 09/28/2022    Cellulitis of right abdominal wall    Cervicalgia 07/01/2020    Cervicalgia of iobtofhs-[id number] region    Chronic maxillary sinusitis 01/04/2022    Chronic maxillary sinusitis    Chronic sialoadenitis 03/16/2020    Chronic sialoadenitis    COVID-19 01/06/2022    COVID-19 with multiple comorbidities    Decreased white blood cell count, unspecified 11/04/2019    Leukopenia    Disturbances of salivary secretion 03/16/2020    Xerostomia    Dry eye syndrome of bilateral lacrimal glands 10/07/2022    Dry eyes, bilateral    Encounter for preprocedural cardiovascular examination 02/01/2022    Preoperative cardiovascular examination    Food additives allergy status 06/11/2020    Allergy to food dye    Fracture of nasal bones, initial encounter for closed fracture 03/03/2022    Closed fracture of nasal bone, initial encounter    Granuloma of right orbit 10/07/2021    Inflammatory pseudotumor of right orbit    History of endometrial ablation 11/09/2017    Localized swelling, mass and lump, head 03/24/2022    Swollen nose    Major depressive disorder, recurrent, in full remission (CMS/HCC) 10/07/2021    Depression, major, recurrent, in complete remission    Mammary duct ectasia of left breast 08/24/2022    Periductal mastitis of left breast    Nipple discharge 08/24/2022    Bloody discharge from left nipple    Ocular pain, right eye 10/07/2022    Pain in right eye    Other abnormal and inconclusive findings on diagnostic imaging of breast 07/06/2020    Other abnormal and inconclusive findings on diagnostic imaging of breast    Other anomalies of pupillary function 05/31/2019    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    Other chest pain 05/18/2020    Chest discomfort    Other conditions influencing health status 08/01/2022    History of cough    Other conditions influencing health status 08/03/2021    Chronic  migraine    Other specified disorders of eustachian tube, left ear 11/18/2019    ETD (Eustachian tube dysfunction), left    Other specified disorders of nose and nasal sinuses 03/24/2022    Nasal dryness    Other specified disorders of nose and nasal sinuses 03/24/2022    Nasal crusting    Pelvic and perineal pain 07/06/2020    Pelvic pain    Personal history of other diseases of the circulatory system 04/07/2020    History of sinus tachycardia    Personal history of other diseases of the circulatory system 04/07/2020    History of abnormal electrocardiography    Personal history of other diseases of the circulatory system     History of Raynaud's syndrome    Personal history of other diseases of the digestive system     History of irritable bowel syndrome    Personal history of other diseases of the digestive system 03/02/2020    History of oral pain    Personal history of other diseases of the musculoskeletal system and connective tissue 01/19/2022    History of neck pain    Personal history of other diseases of the musculoskeletal system and connective tissue 03/02/2021    History of scleroderma    Personal history of other diseases of the musculoskeletal system and connective tissue 06/16/2020    History of muscle weakness    Personal history of other diseases of the nervous system and sense organs 11/18/2019    History of hearing loss    Personal history of other diseases of the nervous system and sense organs 09/21/2022    History of partial seizures    Personal history of other diseases of the respiratory system 04/14/2021    History of asthma    Personal history of other endocrine, nutritional and metabolic disease 02/17/2021    History of diabetes mellitus    Personal history of other mental and behavioral disorders 05/27/2021    History of anxiety    Personal history of other specified conditions 09/07/2022    History of nipple discharge    Personal history of other specified conditions 10/16/2019     History of headache    Personal history of other specified conditions 09/28/2022    History of lump of left breast    Personal history of other specified conditions 09/16/2021    History of persistent cough    Personal history of other specified conditions 02/01/2022    History of palpitations    Personal history of other specified conditions 03/09/2022    History of headache    Personal history of other specified conditions 02/12/2014    History of chest pain    Personal history of other specified conditions 10/27/2021    History of nausea and vomiting    Personal history of other specified conditions 10/16/2019    History of fatigue    Personal history of other specified conditions 02/26/2021    History of orthopnea    Personal history of other specified conditions 02/22/2021    History of shortness of breath    Personal history of urinary calculi     H/O renal calculi    Postural orthostatic tachycardia syndrome (POTS)     POTS (postural orthostatic tachycardia syndrome)    Rash and other nonspecific skin eruption 03/15/2022    Rash    Repeated falls 06/23/2021    Recurrent falls    Right lower quadrant pain 10/14/2020    Abdominal pain, RLQ (right lower quadrant)    Sjogren syndrome, unspecified (CMS/[facility])     History of Sjogren's disease    Slow transit constipation 07/09/2020    Slow transit constipation    Subarachnoid hemorrhage, traumatic (CMS/[facility]) 04/19/2023    Traumatic subarachnoid hemorrhage with loss of consciousness of unspecified duration, subsequent encounter 03/15/2022    Subarachnoid hemorrhage following injury, with loss of consciousness, subsequent encounter    Traumatic subarachnoid hemorrhage without loss of consciousness, subsequent encounter     Subarachnoid hemorrhage following injury, no loss of consciousness, subsequent encounter    Unspecified disorder of refraction 10/07/2022    Refractive error    Unspecified optic neuritis 11/06/2020    Right optic neuritis    Unspecified optic  neuritis 11/06/2020    Optic neuritis, right    Unspecified visual loss 09/25/2019    Vision loss    Venous insufficiency (chronic) (peripheral) 10/18/2021    Chronic venous insufficiency of lower extremity    Viral infection, unspecified 01/11/2022    Nonspecific syndrome suggestive of viral illness    Vitamin D deficiency, unspecified 09/28/2022    Vitamin D deficiency       Family medical history includes stroke in sister.    Past Surgical History:   Procedure Laterality Date    CT ANGIO NECK  7/28/2021    CT NECK ANGIO W AND WO IV CONTRAST 7/28/2021    CT ANGIO NECK  8/27/2020    CT NECK ANGIO W AND WO IV CONTRAST 8/27/2020    CT HEAD ANGIO W AND WO IV CONTRAST  7/28/2021    CT HEAD ANGIO W AND WO IV CONTRAST 7/28/2021    CT HEAD ANGIO W AND WO IV CONTRAST  7/15/2021    CT HEAD ANGIO W AND WO IV CONTRAST 7/15/2021    CT HEAD ANGIO W AND WO IV CONTRAST  8/27/2020    CT HEAD ANGIO W AND WO IV CONTRAST 8/27/2020    MR HEAD ANGIO WO IV CONTRAST  2/8/2021    MR HEAD ANGIO WO IV CONTRAST 2/8/2021 [facility]ICAL [facility]GAC[facility]    MR NECK ANGIO WO IV CONTRAST  2/8/2021    MR NECK ANGIO WO IV CONTRAST 2/8/2021 [facility] CLINICAL LEGACY    OTHER SURGICAL HISTORY  08/22/2019    Carpal tunnel surgery    OTHER SURGICAL HISTORY  08/22/2019    Hysterectomy    OTHER SURGICAL HISTORY  08/22/2019    Venous access port placement    OTHER SURGICAL HISTORY  08/22/2019    Cholecystectomy    OTHER SURGICAL HISTORY  08/22/2019    Appendectomy    OTHER SURGICAL HISTORY  08/22/2019    Pyloroplasty       Social History     Substance and Sexual Activity   Drug Use Never     Tobacco Use: Low Risk  (11/14/2023)    Patient History     Smoking Tobacco Use: Never     Smokeless Tobacco Use: Never     Passive Exposure: Not on file     Alcohol Use: Not on file           Objective     Neurological Exam  Mental Status  Awake, alert and oriented to person, place and time. Oriented to person, place, time and situation. Recent and remote memory are intact. Speech is  normal. Language is fluent with no aphasia. Attention and concentration are normal. Fund of knowledge is appropriate for level of education. Apraxia absent.    Cranial Nerves  CN III, IV, VI: Extraocular movements intact bilaterally. Normal lids and orbits bilaterally. Pupils equal round and reactive to light bilaterally.  CN VII: Full and symmetric facial movement.  CN VIII: Hearing is normal.    Motor  Normal muscle bulk throughout. No fasciculations present. Normal muscle tone. No abnormal involuntary movements. Strength is 5/5 throughout all four extremities.    Gait  Normal casual, toe, heel and tandem gait.           Results for orders placed or performed in visit on 11/11/22   MR brain w and wo IV contrast    Narrative    MRN: [id number]  Patient Name: TI AWAD     STUDY:  MRI BRAIN W/WO CONTRAST;  11/11/2022 7:01 pm     INDICATION:  Patient with new stroke diagnosed on CT from [facility] with left frontal  stroke not seen on prior studies.  G43.109: Complicated migraine.     COMPARISON:  CT head dated 10/05/2022; MRI of the brain dated 09/10/2021.     ACCESSION NUMBER(S):  [id number]     ORDERING CLINICIAN:  EDITH KIMBLE     TECHNIQUE:  Multiplanar and multisequence MRI of the brain was performed before  and after intravenous administration of 20 mL of Dotarem gadolinium  contrast.     FINDINGS:  Surgical changes of prior right frontal approach bolt placement are  again demonstrated, with small amount of dark gradient echo signal  correspond to hemosiderin staining present within the sulci of the  right frontal lobe. Small amount of FLAIR hyperintense signal is  present within the adjacent subcortical white (series 4, image 6).     There is no evidence of diffusion restriction abnormality to suggest  an acute infarct.     There is no new intracranial hemorrhage. No mass effect or midline  shift.     There is no ventricular dilatation. Basal cisterns are patent. No  abnormal extra-axial fluid collections are  evident.     Aside from parenchymal signal changes in the right frontal lobe above  the site of patient's prior bolt placement, no additional parenchymal  signal abnormalities are identified. No areas of cystic  encephalomalacia suggesting previous infarcts is present.     There is no pathologic intracranial enhancement.     There is mild mucosal thickening present in the inferior left  maxillary sinus.     IMPRESSION:  1.  Changes of prior right frontal approach bolt placement are again  demonstrated, with small amount of hemosiderin staining present  within the sulci of the right frontal lobe, and small amount of FLAIR  hyperintense gliosis present in the adjacent white matter.  2. Otherwise, no acute intracranial abnormality is evident. No  evidence of new infarct or acute hemorrhage. No areas of cystic  encephalomalacia suggestive of previous infarcts are identified.      Results for orders placed or performed in visit on 09/25/22   CT head wo IV contrast    Narrative    MRN: [id number]  Patient Name: TI AWAD     STUDY:  CT HEAD WO CONTRAST;  9/25/2022 7:47 am     INDICATION:  Headache, blurred vision right eye. hx of pseudotumor cerebri .     COMPARISON:  04/20/2022     ACCESSION NUMBER(S):  [id number]     ORDERING CLINICIAN:  VASU DEL ANGEL     TECHNIQUE:  Noncontrast axial CT scan of head was performed. Angled reformats in  brain and bone windows were generated. The images were reviewed in  bone, brain, blood and soft tissue windows.     FINDINGS:  CSF Spaces: The ventricles, sulci and basal cisterns are within  normal limits. There is no extraaxial fluid collection.     Parenchyma:  The grey-white differentiation is intact. There is no  mass effect or midline shift.  There is no intracranial hemorrhage.     Calvarium: The calvarium is unremarkable.     Paranasal sinuses and mastoids: Visualized paranasal sinuses and  mastoids are clear.     IMPRESSION:  No evidence of acute cortical infarct or intracranial  hemorrhage.     No evidence of intracranial hemorrhage or displaced skull fracture.   Results for orders placed or performed in visit on 09/08/22   XR cervical spine complete 6+ views    Narrative    MRN: [id number]  Patient Name: TI AWAD     STUDY:  SPINE, CERVICAL, CMPLT, OBLIQUE/FLEX/EXT     INDICATION:  NECK PAIN.  M47.816: Lumbar spondylosis M54.16: Lumbar radiculopathy.     COMPARISON:  July 1, 2020     ACCESSION NUMBER(S):  [id number]     ORDERING CLINICIAN:  JOSÉ NEWTON     FINDINGS:  Mild C5-6 cervical discogenic degenerative disease.     Mild facet arthrosis at C4-5 leading to a mild anterolisthesis.     No pathologic motion.        IMPRESSION:  Mild C5-6 cervical discogenic degenerative disease.     Mild facet arthrosis at C4-5 leading to a mild anterolisthesis.   Results for orders placed or performed in visit on 03/08/22   EEG    Narrative    Ordered by an unspecified provider.   Results for orders placed or performed in visit on 02/13/22   CT CERVICAL SPINE WO IV CONTRAST    Narrative    MRN: [id number]  Patient Name: MARY GRAYOTDCCXLI     STUDY:  CT HEAD WO CONTRAST; CT CORONAL/SAGITTAL/OBLIQUE RECON; CT FACIAL  BONES; CT C-SPINE WO CONTRAST;  2/13/2022 10:32 pm     INDICATION:  TRAUMA.     COMPARISON:  None.     ACCESSION NUMBER(S):  [id number]; [id number]; [id number]; [id number]     ORDERING CLINICIAN:  AYDIN WESTON     TECHNIQUE:  Noncontrast axial CT scan of head was performed. Angled reformats in  brain and bone windows were generated. The images were reviewed in  bone, brain, blood and soft tissue windows. Coronal and sagittal  reformats were provided.     CT of the face as per protocol for trauma face with reconstructed  images in the coronal and sagittal plane provided. 3D images of the  face generated into plane in separate working station.     CT of the cervical spine as per protocol with images, sagittal and  coronal reconstruction provided.     FINDINGS:        Parenchyma -CSF Spaces: :  Multifocal subarachnoid hemorrhage, in the  right superior frontal lobe. When compared from the previous exam  02/08/2022 perform outside there appears more diffuse involvement of  the right superior frontal lobe. There are diffuse effacement of the  sulci in the right superior frontal lobe and edema medially.  There has been decrease size of the parenchymal hemorrhage in the  right frontal lobe, noted in the previous exam. Prior changes of  right frontal calvarial pressure bolt with associated  encephalomalacia in the right frontal lobe. No herniation.     No midline shift. Mild mass effect due to edema in the right frontal  lobe, noted in the frontal horn. Mild subcortical and periventricular  diffuse white matter hypodensity.     Calvarium: The calvarium is unremarkable. Midline frontal soft tissue  hematoma.     Paranasal sinuses and mastoids: Visualized paranasal sinuses and  mastoids are clear.     Face:     The bony orbits are intact. The orbital contents are unremarkable.     Nondisplaced fracture of the right nasal bone.     Visualized portions of mandible and bilateral temporomandibular  joints are intact.     Visualized paranasal sinuses and mastoids are clear.     Cervical Spine:     There is no evidence for an acute fracture or subluxation of the  cervical spine. There is no significant central canal stenosis.     Vertebral bodies are normal in height and alignment.  Disc spaces are  preserved.     The odontoid process and craniocervical junction are intact.     The prevertebral and paraspinal soft tissues are unremarkable.     Bilateral multiple subcentimeter cervical lymph nodes are noted,  likely reactive.     Thyroid gland is within normal limits.     Upper lungs and mediastinum are unremarkable.     IMPRESSION:  Minimally displaced fracture of the left nasal bone with air foci in  soft tissue swelling.     IMPRESSION:  Multifocal subarachnoid hemorrhage in the right superior frontal lobe  with mild  increased edema when compared from the outside CT dated  02/08/2022.     Mild diffuse effacement of the sulci in the superior frontal lobe.  Mild mass effect noted in the frontal horn of the right lateral  ventricle.     No midline shift. There has been decrease size of the parenchymal  hemorrhage in the right frontal lobe, noted in the previous exam.  Prior changes of right frontal calvarial pressure bolt with  associated encephalomalacia in the right frontal lobe.     Soft tissue hematoma in the frontal scalp in the midline and slightly  to the right.     No acute calvarial fracture.     Nondisplaced fracture of the right nasal bone.     No acute fracture or traumatic malalignment of the cervical spine.  Document Only:  The critical information above was relayed directly by me by  telephone to AYDIN WESTON on 2/13/2022 at 10:45 pm with readback  verification.   Results for orders placed or performed in visit on 02/13/22   CT LUMBAR SPINE WO IV CONTRAST    Narrative    MRN: [id number]  Patient Name: TRAUMA, IGNACIACXLI     STUDY:  CT CHEST ABDOMEN PELVIS W IV CONTRAST; CT L-SPINE WO CONTRAST; CT  T-SPINE WO CONTRAST;  2/13/2022 10:32 pm     INDICATION:  TRAUMA. 46 year old female with fall at outside hospital. Actual MRN  [id number]     COMPARISON:  None.     ACCESSION NUMBER(S):  [id number]; [id number]; [id number]     ORDERING CLINICIAN:  AYDIN WESTON     TECHNIQUE:  Contiguous axial images of the chest, abdomen, and pelvis were  obtained after the intravenous administration of 90 cc Omnipaque 350  contrast. Coronal and sagittal reformatted images were reconstructed  from the axial data.     Multiplanar reformatted images of the thoracic and lumbar spine were  reconstructed from source data of concurrent CT chest/abdomen/pelvis  acquisition.     FINDINGS:  CT CHEST:     MEDIASTINUM AND LYMPH NODES: No enlarged intrathoracic or axillary  lymph nodes. No pneumomediastinum.     VESSELS: Right chest wall MediPort  catheter is noted with tip in the  right atrium. Normal caliber aorta without dissection. No significant  aortic atherosclerosis.     HEART: Normal size. No coronary artery calcifications. No significant  pericardial effusion.     LUNG, AIRWAYS, PLEURA: No consolidation, pulmonary edema, pleural  effusion or pneumothorax.     OSSEOUS STRUCTURES: No acute osseous abnormality.     CHEST WALL SOFT TISSUES: No acute abnormality.        CT ABDOMEN/PELVIS:     ABDOMINAL WALL: No acute abnormality.     LIVER: No significant parenchymal abnormality.     BILE DUCTS: No significant intrahepatic or extrahepatic dilatation.     GALLBLADDER: The gallbladder is surgically absent.     SPLEEN: No significant abnormality.     PANCREAS: No significant abnormality.     ADRENALS: No significant abnormality.     KIDNEYS, URETERS, BLADDER: No significant abnormality.     REPRODUCTIVE ORGANS: No significant abnormality.     VESSELS: No significant abnormality.     LYMPH NODES/RETROPERITONEUM: No enlarged lymph nodes.     BOWEL/MESENTERY/PERITONEUM: No bowel wall thickening or dilatation.  Normal appendix. No ascites, free air or fluid collection.     MUSCULOSKELETAL: No acute osseous abnormality.        CT THORACIC SPINE:     PARASPINAL SOFT TISSUES: No paravertebral fluid collection or  significant edema.  ALIGNMENT: No traumatic spondylolisthesis or traumatic facet widening.  VERTEBRAE: No acute fracture. Vertebral body heights are maintained.  SPINAL CANAL/INTERVERTEBRAL DISCS: No high-grade spinal canal  stenosis. No significant disc height loss. Or        CT LUMBAR SPINE:     PARASPINAL SOFT TISSUES: No paravertebral fluid collection or  significant edema.  ALIGNMENT: No traumatic spondylolisthesis or traumatic facet widening.  VERTEBRAE: No acute fracture.  Vertebral body heights are maintained.  SPINAL CANAL/INTERVERTEBRAL DISCS: No high-grade spinal canal  stenosis. No significant disc height loss.  NEURAL FORAMINA: No significant  neural foraminal stenosis.     IMPRESSION:  CT CHEST/ABDOMEN/PELVIS:  No acute traumatic injury or abdominopelvic abnormality.        CT THORACIC AND LUMBAR SPINE:  No acute fracture or traumatic malalignment.     I personally reviewed the images/study and I agree with the findings  as stated. This study was interpreted at [place], [place].   Results for orders placed or performed in visit on 02/13/22   CT THORACIC SPINE WO IV CONTRAST    Narrative    MRN: [id number]  Patient Name: TRAUMA, JOYA     STUDY:  CT CHEST ABDOMEN PELVIS W IV CONTRAST; CT L-SPINE WO CONTRAST; CT  T-SPINE WO CONTRAST;  2/13/2022 10:32 pm     INDICATION:  TRAUMA. 46 year old female with fall at outside hospital. Actual MRN  [id number]     COMPARISON:  None.     ACCESSION NUMBER(S):  [id number]; [id number]; [id number]     ORDERING CLINICIAN:  AYDIN WESTON     TECHNIQUE:  Contiguous axial images of the chest, abdomen, and pelvis were  obtained after the intravenous administration of 90 cc Omnipaque 350  contrast. Coronal and sagittal reformatted images were reconstructed  from the axial data.     Multiplanar reformatted images of the thoracic and lumbar spine were  reconstructed from source data of concurrent CT chest/abdomen/pelvis  acquisition.     FINDINGS:  CT CHEST:     MEDIASTINUM AND LYMPH NODES: No enlarged intrathoracic or axillary  lymph nodes. No pneumomediastinum.     VESSELS: Right chest wall MediPort catheter is noted with tip in the  right atrium. Normal caliber aorta without dissection. No significant  aortic atherosclerosis.     HEART: Normal size. No coronary artery calcifications. No significant  pericardial effusion.     LUNG, AIRWAYS, PLEURA: No consolidation, pulmonary edema, pleural  effusion or pneumothorax.     OSSEOUS STRUCTURES: No acute osseous abnormality.     CHEST WALL SOFT TISSUES: No acute abnormality.        CT ABDOMEN/PELVIS:     ABDOMINAL WALL: No acute  abnormality.     LIVER: No significant parenchymal abnormality.     BILE DUCTS: No significant intrahepatic or extrahepatic dilatation.     GALLBLADDER: The gallbladder is surgically absent.     SPLEEN: No significant abnormality.     PANCREAS: No significant abnormality.     ADRENALS: No significant abnormality.     KIDNEYS, URETERS, BLADDER: No significant abnormality.     REPRODUCTIVE ORGANS: No significant abnormality.     VESSELS: No significant abnormality.     LYMPH NODES/RETROPERITONEUM: No enlarged lymph nodes.     BOWEL/MESENTERY/PERITONEUM: No bowel wall thickening or dilatation.  Normal appendix. No ascites, free air or fluid collection.     MUSCULOSKELETAL: No acute osseous abnormality.        CT THORACIC SPINE:     PARASPINAL SOFT TISSUES: No paravertebral fluid collection or  significant edema.  ALIGNMENT: No traumatic spondylolisthesis or traumatic facet widening.  VERTEBRAE: No acute fracture. Vertebral body heights are maintained.  SPINAL CANAL/INTERVERTEBRAL DISCS: No high-grade spinal canal  stenosis. No significant disc height loss. Or        CT LUMBAR SPINE:     PARASPINAL SOFT TISSUES: No paravertebral fluid collection or  significant edema.  ALIGNMENT: No traumatic spondylolisthesis or traumatic facet widening.  VERTEBRAE: No acute fracture.  Vertebral body heights are maintained.  SPINAL CANAL/INTERVERTEBRAL DISCS: No high-grade spinal canal  stenosis. No significant disc height loss.  NEURAL FORAMINA: No significant neural foraminal stenosis.     IMPRESSION:  CT CHEST/ABDOMEN/PELVIS:  No acute traumatic injury or abdominopelvic abnormality.        CT THORACIC AND LUMBAR SPINE:  No acute fracture or traumatic malalignment.     I personally reviewed the images/study and I agree with the findings  as stated. This study was interpreted at [place], [place].   Results for orders placed or performed in visit on 01/14/22   MR LUMBAR SPINE W AND WO IV  CONTRAST    Narrative    Addendum Begins  MRN: [id number]  Patient Name: TI AWAD     ADDENDUM:  Multilevel degenerative changes are present in the cervical, thoracic  and lumbar spine as noted.     The greatest degree of spinal stenosis is at C5-6 where the cord is  deformed by central disc protrusion more so than noted previously.  The left neural foramen is mildly stenosed at this level as well.    Addendum Ends  MRN: [id number]  Patient Name: TI AWAD     STUDY:  MR CERVICAL WO/W CONTRAST; MR T-SPINE WO/W; MR L-SPINE WO/W CONTRAST;  1/14/2022 2:27 pm     INDICATION:  cervical spine pain, left arm weakness/numbness ; upper T spine pain,  left arm weakness/numbness ; left arm numbness/weakness, recent  fevers.  Fever a few days ago but none since then. Muscle spasms  upper back, upper back pain, neck pain for 2 days. Limited range of  motion of neck. Left arm weakness and tingling.     COMPARISON:  MRI cervical and thoracic spine 02/08/2021.     ACCESSION NUMBER(S):  [id number]; [id number]; [id number]     ORDERING CLINICIAN:  FAYE SUN     TECHNIQUE:  Sagittal T1, T2, STIR, axial T1 and axial T2 weighted images were  acquired through the cervical spine.  Enhanced images of the  cervical, thoracic and lumbar spine were obtained using 20 mL  gadoterate (Dotarem)     FINDINGS:  The spinal cord is normal. The spinal cord and intrathecal contents  in the cervical, thoracic and lumbar region enhance normally.     MRI CERVICAL SPINE:     Alignment: There is grade 1 anterolisthesis of C4 on C5.     Vertebrae/Intervertebral Discs: The vertebral bodies demonstrate  expected height.  The marrow signal is within normal limits. The  intervertebral discs demonstrate expected signal and morphology.     Cord: Normal in caliber and signal. No evidence of abnormal  enhancement within the cervical spinal cord.     Craniocervical junction: There is no mass of the foramen magnum. The  atlanto odontoid articulation has mild  degenerative changes.     C2-3: No stenosis is noted.     C3-4: No stenosis noted. The left facet joint is mildly arthritic.     C4-5: The left facet joint is severely arthritic. The thecal sac,  left lateral recess and neural foramen are mildly stenosed by disc  bulge, uncovertebral spurring and facet hypertrophy similar to the  prior exam.     C5-6: The spinal canal and lateral recesses are moderately stenosed  by central disc protrusion with flattening of the cord more so than  noted previously. The left neural foramen is mildly stenosed as well.        C6-7: No stenosis is noted.     C7-T1: No stenosis is noted.     T1-2 and T2-3: The sagittal images demonstrate no stenoses.     The prevertebral and posterior paraspinous soft tissues are within  normal limits.           MRI THORACIC SPINE:     There are 12 rib-bearing thoracic vertebrae.     Alignment: Within normal limits.     Vertebrae/Intervertebral Discs: The vertebral body heights are  intact.  Marrow signal is within normal limits. The disc spaces are  preserved. There is no significant central canal stenosis.     Cord: Normal in signal. No abnormal enhancing lesions.     Paraspinous Soft Tissues: Within normal limits.           MRI LUMBAR SPINE:     There are 5 lumbar type vertebrae.     Alignment: The vertebral alignment is maintained.     Vertebrae/Intervertebral Discs: The vertebral bodies demonstrate  expected height.The marrow signal is within normal limits. The L2-3,  L4-5 and L5-S1 discs have degenerative desiccation. The discs at  these levels are slightly decreased in height as well.     Conus: The conus terminates at the superior endplate of L2.     T12-L1:  There is no significant central canal or neural foraminal  stenosis.     L1-2:  There is no significant central canal or neural foraminal  stenosis.     L2-3:  There is no significant central canal or neural foraminal  stenosis.     L3-4:  There is no significant central canal or neural  foraminal  stenosis. The facet joints are mildly arthritic.     L4-5:  There is broad-based circumferential disc bulging with mild  spinal canal and lateral recess stenosis. The facet joints are  moderately arthritic with small effusions bilaterally.     L5-S1:  No stenoses are noted. The facet joints are mildly to  moderately arthritic with small effusions bilaterally.     The prevertebral and posterior paraspinous soft tissues are  unremarkable.     IMPRESSION:   MR THORACIC SPINE W AND WO IV CONTRAST    Narrative    Addendum Begins  MRN: [id number]  Patient Name: TI AWAD     ADDENDUM:  Multilevel degenerative changes are present in the cervical, thoracic  and lumbar spine as noted.     The greatest degree of spinal stenosis is at C5-6 where the cord is  deformed by central disc protrusion more so than noted previously.  The left neural foramen is mildly stenosed at this level as well.    Addendum Ends  MRN: [id number]  Patient Name: TI AWAD     STUDY:  MR CERVICAL WO/W CONTRAST; MR T-SPINE WO/W; MR L-SPINE WO/W CONTRAST;  1/14/2022 2:27 pm     INDICATION:  cervical spine pain, left arm weakness/numbness ; upper T spine pain,  left arm weakness/numbness ; left arm numbness/weakness, recent  fevers.  Fever a few days ago but none since then. Muscle spasms  upper back, upper back pain, neck pain for 2 days. Limited range of  motion of neck. Left arm weakness and tingling.     COMPARISON:  MRI cervical and thoracic spine 02/08/2021.     ACCESSION NUMBER(S):  [id number]; [id number]; [id number]     ORDERING CLINICIAN:  FAYE SUN     TECHNIQUE:  Sagittal T1, T2, STIR, axial T1 and axial T2 weighted images were  acquired through the cervical spine.  Enhanced images of the  cervical, thoracic and lumbar spine were obtained using 20 mL  gadoterate (Dotarem)     FINDINGS:  The spinal cord is normal. The spinal cord and intrathecal contents  in the cervical, thoracic and lumbar region enhance normally.     MRI CERVICAL  SPINE:     Alignment: There is grade 1 anterolisthesis of C4 on C5.     Vertebrae/Intervertebral Discs: The vertebral bodies demonstrate  expected height.  The marrow signal is within normal limits. The  intervertebral discs demonstrate expected signal and morphology.     Cord: Normal in caliber and signal. No evidence of abnormal  enhancement within the cervical spinal cord.     Craniocervical junction: There is no mass of the foramen magnum. The  atlanto odontoid articulation has mild degenerative changes.     C2-3: No stenosis is noted.     C3-4: No stenosis noted. The left facet joint is mildly arthritic.     C4-5: The left facet joint is severely arthritic. The thecal sac,  left lateral recess and neural foramen are mildly stenosed by disc  bulge, uncovertebral spurring and facet hypertrophy similar to the  prior exam.     C5-6: The spinal canal and lateral recesses are moderately stenosed  by central disc protrusion with flattening of the cord more so than  noted previously. The left neural foramen is mildly stenosed as well.        C6-7: No stenosis is noted.     C7-T1: No stenosis is noted.     T1-2 and T2-3: The sagittal images demonstrate no stenoses.     The prevertebral and posterior paraspinous soft tissues are within  normal limits.           MRI THORACIC SPINE:     There are 12 rib-bearing thoracic vertebrae.     Alignment: Within normal limits.     Vertebrae/Intervertebral Discs: The vertebral body heights are  intact.  Marrow signal is within normal limits. The disc spaces are  preserved. There is no significant central canal stenosis.     Cord: Normal in signal. No abnormal enhancing lesions.     Paraspinous Soft Tissues: Within normal limits.           MRI LUMBAR SPINE:     There are 5 lumbar type vertebrae.     Alignment: The vertebral alignment is maintained.     Vertebrae/Intervertebral Discs: The vertebral bodies demonstrate  expected height.The marrow signal is within normal limits. The  L2-3,  L4-5 and L5-S1 discs have degenerative desiccation. The discs at  these levels are slightly decreased in height as well.     Conus: The conus terminates at the superior endplate of L2.     T12-L1:  There is no significant central canal or neural foraminal  stenosis.     L1-2:  There is no significant central canal or neural foraminal  stenosis.     L2-3:  There is no significant central canal or neural foraminal  stenosis.     L3-4:  There is no significant central canal or neural foraminal  stenosis. The facet joints are mildly arthritic.     L4-5:  There is broad-based circumferential disc bulging with mild  spinal canal and lateral recess stenosis. The facet joints are  moderately arthritic with small effusions bilaterally.     L5-S1:  No stenoses are noted. The facet joints are mildly to  moderately arthritic with small effusions bilaterally.     The prevertebral and posterior paraspinous soft tissues are  unremarkable.     IMPRESSION:   MR CERVICAL SPINE W AND WO IV CONTRAST    Narrative    Addendum Begins  MRN: [id number]  Patient Name: TI AWAD     ADDENDUM:  Multilevel degenerative changes are present in the cervical, thoracic  and lumbar spine as noted.     The greatest degree of spinal stenosis is at C5-6 where the cord is  deformed by central disc protrusion more so than noted previously.  The left neural foramen is mildly stenosed at this level as well.    Addendum Ends  MRN: [id number]  Patient Name: TI AWAD     STUDY:  MR CERVICAL WO/W CONTRAST; MR T-SPINE WO/W; MR L-SPINE WO/W CONTRAST;  1/14/2022 2:27 pm     INDICATION:  cervical spine pain, left arm weakness/numbness ; upper T spine pain,  left arm weakness/numbness ; left arm numbness/weakness, recent  fevers.  Fever a few days ago but none since then. Muscle spasms  upper back, upper back pain, neck pain for 2 days. Limited range of  motion of neck. Left arm weakness and tingling.     COMPARISON:  MRI cervical and thoracic spine  02/08/2021.     ACCESSION NUMBER(S):  [id number]; [id number]; [id number]     ORDERING CLINICIAN:  FAYE SUN     TECHNIQUE:  Sagittal T1, T2, STIR, axial T1 and axial T2 weighted images were  acquired through the cervical spine.  Enhanced images of the  cervical, thoracic and lumbar spine were obtained using 20 mL  gadoterate (Dotarem)     FINDINGS:  The spinal cord is normal. The spinal cord and intrathecal contents  in the cervical, thoracic and lumbar region enhance normally.     MRI CERVICAL SPINE:     Alignment: There is grade 1 anterolisthesis of C4 on C5.     Vertebrae/Intervertebral Discs: The vertebral bodies demonstrate  expected height.  The marrow signal is within normal limits. The  intervertebral discs demonstrate expected signal and morphology.     Cord: Normal in caliber and signal. No evidence of abnormal  enhancement within the cervical spinal cord.     Craniocervical junction: There is no mass of the foramen magnum. The  atlanto odontoid articulation has mild degenerative changes.     C2-3: No stenosis is noted.     C3-4: No stenosis noted. The left facet joint is mildly arthritic.     C4-5: The left facet joint is severely arthritic. The thecal sac,  left lateral recess and neural foramen are mildly stenosed by disc  bulge, uncovertebral spurring and facet hypertrophy similar to the  prior exam.     C5-6: The spinal canal and lateral recesses are moderately stenosed  by central disc protrusion with flattening of the cord more so than  noted previously. The left neural foramen is mildly stenosed as well.        C6-7: No stenosis is noted.     C7-T1: No stenosis is noted.     T1-2 and T2-3: The sagittal images demonstrate no stenoses.     The prevertebral and posterior paraspinous soft tissues are within  normal limits.           MRI THORACIC SPINE:     There are 12 rib-bearing thoracic vertebrae.     Alignment: Within normal limits.     Vertebrae/Intervertebral Discs: The vertebral body heights  are  intact.  Marrow signal is within normal limits. The disc spaces are  preserved. There is no significant central canal stenosis.     Cord: Normal in signal. No abnormal enhancing lesions.     Paraspinous Soft Tissues: Within normal limits.           MRI LUMBAR SPINE:     There are 5 lumbar type vertebrae.     Alignment: The vertebral alignment is maintained.     Vertebrae/Intervertebral Discs: The vertebral bodies demonstrate  expected height.The marrow signal is within normal limits. The L2-3,  L4-5 and L5-S1 discs have degenerative desiccation. The discs at  these levels are slightly decreased in height as well.     Conus: The conus terminates at the superior endplate of L2.     T12-L1:  There is no significant central canal or neural foraminal  stenosis.     L1-2:  There is no significant central canal or neural foraminal  stenosis.     L2-3:  There is no significant central canal or neural foraminal  stenosis.     L3-4:  There is no significant central canal or neural foraminal  stenosis. The facet joints are mildly arthritic.     L4-5:  There is broad-based circumferential disc bulging with mild  spinal canal and lateral recess stenosis. The facet joints are  moderately arthritic with small effusions bilaterally.     L5-S1:  No stenoses are noted. The facet joints are mildly to  moderately arthritic with small effusions bilaterally.     The prevertebral and posterior paraspinous soft tissues are  unremarkable.     IMPRESSION:   Results for orders placed or performed in visit on 07/20/21   MR BRAIN WO IV CONTRAST    Narrative    MRN: [id number]  Patient Name: TI AWAD     STUDY:  MRI BRAIN WO;  7/20/2021 8:02 pm     INDICATION:  R/O CVA.  Right-sided weakness for 2 days     COMPARISON:  CT 07/19/2021, MRI 06/03/2021, 09/16/2019     ACCESSION NUMBER(S):  [id number]     ORDERING CLINICIAN:  KIESHA PAYNE     TECHNIQUE:  Axial T2, FLAIR, DWI, gradient echo T2 and sagittal and coronal T1  weighted images of  brain were acquired.     FINDINGS:  CSF Spaces: The ventricles, sulci and basal cisterns are within  normal limits.     Parenchyma: There is no diffusion restriction abnormality to suggest  acute infarct. Faint hyperintense signal within the left medulla  (series 10, image 22) seen on FLAIR imaging and not well appreciated  on additional sequences however indeterminate could represent  artifact. Artifact was at this level on prior imaging and this is not  seen on 09/16/2019. There is no focal parenchymal signal abnormality.  There is no mass effect or midline shift.     Paranasal Sinuses and Mastoids: Visualized paranasal sinuses and  mastoid air cells are unremarkable.     IMPRESSION:  Faint region FLAIR hyperintense signal within the left medulla which  may be artifactual. Attention on follow-up suggested. Otherwise no  evidence of acute infarct, intracranial mass effect or midline shift.   Results for orders placed or performed in visit on 02/08/21   MR CERVICAL SPINE WO IV CONTRAST    Narrative    MRN: [id number]  Patient Name: TI AWAD     STUDY:  MRI of the cervical and thoracic spine without contrast.     INDICATION:  fall 2/6/21, R-sided paresthesias/weakness     COMPARISON:  CT cervical and thoracic spine 02/08/2021     ACCESSION NUMBER(S):  [id number]; [id number]     ORDERING CLINICIAN:  GEOVANNA CAGLE     TECHNIQUE:  Multiplanar, multisequence imaging of the cervical and thoracic spine  was performed  without contrast.     FINDINGS:  CERVICAL:  Alignment: Unremarkable.     Bones: Unremarkable.     Cord: Unremarkable.     Disc spaces: Loss of disc height at C5-6 with a disc bulge, effacing  the ventral thecal sac and distorting the ventral cord without cord  signal abnormality. Minimal spinal canal narrowing without foraminal  narrowing. Otherwise no spinal canal or foraminal narrowing.     Other: Unremarkable.        THORACIC:  Alignment: Unremarkable.     Bones: Unremarkable.     Cord: Unremarkable.     Disc  spaces: Thickening of the ligamentum flavum at T10-11 with  minimal distortion of the right dorsal lateral cord without cord  signal abnormality. No spinal canal or foraminal narrowing.     Other: Unremarkable.     IMPRESSION:  1. Disc bulge at C5-6 with distortion of the ventral cord without  cord signal abnormality and minimal spinal canal narrowing.  2. No thoracic spinal canal or foraminal narrowing.   Results for orders placed or performed in visit on 02/08/21   MR THORACIC SPINE WO IV CONTRAST    Narrative    MRN: [id number]  Patient Name: TI AWAD     STUDY:  MRI of the cervical and thoracic spine without contrast.     INDICATION:  fall 2/6/21, R-sided paresthesias/weakness     COMPARISON:  CT cervical and thoracic spine 02/08/2021     ACCESSION NUMBER(S):  [id number]; [id number]     ORDERING CLINICIAN:  GEOVANNA CAGLE     TECHNIQUE:  Multiplanar, multisequence imaging of the cervical and thoracic spine  was performed  without contrast.     FINDINGS:  CERVICAL:  Alignment: Unremarkable.     Bones: Unremarkable.     Cord: Unremarkable.     Disc spaces: Loss of disc height at C5-6 with a disc bulge, effacing  the ventral thecal sac and distorting the ventral cord without cord  signal abnormality. Minimal spinal canal narrowing without foraminal  narrowing. Otherwise no spinal canal or foraminal narrowing.     Other: Unremarkable.        THORACIC:  Alignment: Unremarkable.     Bones: Unremarkable.     Cord: Unremarkable.     Disc spaces: Thickening of the ligamentum flavum at T10-11 with  minimal distortion of the right dorsal lateral cord without cord  signal abnormality. No spinal canal or foraminal narrowing.     Other: Unremarkable.     IMPRESSION:  1. Disc bulge at C5-6 with distortion of the ventral cord without  cord signal abnormality and minimal spinal canal narrowing.  2. No thoracic spinal canal or foraminal narrowing.   Results for orders placed or performed in visit on 02/08/21   MR ANGIO HEAD WO IV  CONTRAST    Narrative    MRN: [id number]  Patient Name: TI AWAD     STUDY:  MRI BRAIN WO; MRA HEAD W/O C; MRA NECK WO.C;  2/8/2021 10:54 am     INDICATION:  Arm drift/numbness/recent fall concerned about stroke.  Stroke  protocol.     COMPARISON:  Head and cervical spine CT, same day, MRI of the orbits, 11/24/2020     ACCESSION NUMBER(S):  [id number]; [id number]; [id number]     ORDERING CLINICIAN:  KARTIK FLORENTINO     TECHNIQUE:  Axial T2, FLAIR, DWI, gradient echo T2 and  sagittal and coronal T1  weighted images of the brain were acquired.     Time-of-flight MRA of the head  and neck was performed. The images  were reviewed as source images and maximum intensity projections.     FINDINGS:  Brain:     CSF Spaces: The ventricles, sulci and basal cisterns are within  normal limits.  No abnormal extra-axial collection.     Parenchyma: There is no diffusion restriction abnormality to suggest  acute infarct.  No parenchymal signal abnormality. No evidence of  intracranial hemorrhage. There is no mass effect or midline shift.     Paranasal Sinuses and Mastoids: Unremarkable.     Orbits: Decreased size and increased T2 signal intensity of the left  optic nerve was better evaluated previously, the orbits are otherwise  unremarkable.     MRA of head:     Anterior circulation:    There is expected flow signal in bilateral  intracranial internal carotid arteries, bilateral carotid terminals,  bilateral proximal anterior and middle cerebral arteries.  No  aneurysm.     Posterior circulation:  Normal variant left dominant vertebral  artery. Bilateral intracranial vertebral arteries, vertebrobasilar  junction, basilar artery and proximal posterior cerebral arteries  demonstrate expected flow signal. No aneurysm.     MRA of neck:     The source images are mildly degraded by artifact.     Right carotid vessels:  There is expected flow signal in the  visualized portion of the common carotid artery.  There is mild  attenuation of flow  signal at the carotid bifurcation which may be  secondary to flow related artifact. The internal carotid artery in  the neck demonstrates expected flow signal.     Left carotid vessels:   There is expected flow signal in the  visualized portion of the common carotid artery.  There is mild  attenuation of flow signal at the carotid bifurcation which may be  secondary to flow related artifact. The internal carotid artery in  the neck demonstrates expected flow signal.     Vertebral vessels: Left dominant. The visualized segments of the  cervical vertebral arteries demonstrate expected flow signal.     IMPRESSION:  MRI Brain:     No acute intracranial pathology.     MRA:     No flow-limiting stenosis or occlusion on MRA head and neck.   Results for orders placed or performed in visit on 02/08/21   MR ANGIO NECK WO IV CONTRAST    Narrative    MRN: [id number]  Patient Name: TI AWAD     STUDY:  MRI BRAIN WO; MRA HEAD W/O C; MRA NECK WO.C;  2/8/2021 10:54 am     INDICATION:  Arm drift/numbness/recent fall concerned about stroke.  Stroke  protocol.     COMPARISON:  Head and cervical spine CT, same day, MRI of the orbits, 11/24/2020     ACCESSION NUMBER(S):  [id number]; [id number]; [id number]     ORDERING CLINICIAN:  KARTIK FLORENTINO     TECHNIQUE:  Axial T2, FLAIR, DWI, gradient echo T2 and  sagittal and coronal T1  weighted images of the brain were acquired.     Time-of-flight MRA of the head  and neck was performed. The images  were reviewed as source images and maximum intensity projections.     FINDINGS:  Brain:     CSF Spaces: The ventricles, sulci and basal cisterns are within  normal limits.  No abnormal extra-axial collection.     Parenchyma: There is no diffusion restriction abnormality to suggest  acute infarct.  No parenchymal signal abnormality. No evidence of  intracranial hemorrhage. There is no mass effect or midline shift.     Paranasal Sinuses and Mastoids: Unremarkable.     Orbits: Decreased size and increased T2  signal intensity of the left  optic nerve was better evaluated previously, the orbits are otherwise  unremarkable.     MRA of head:     Anterior circulation:    There is expected flow signal in bilateral  intracranial internal carotid arteries, bilateral carotid terminals,  bilateral proximal anterior and middle cerebral arteries.  No  aneurysm.     Posterior circulation:  Normal variant left dominant vertebral  artery. Bilateral intracranial vertebral arteries, vertebrobasilar  junction, basilar artery and proximal posterior cerebral arteries  demonstrate expected flow signal. No aneurysm.     MRA of neck:     The source images are mildly degraded by artifact.     Right carotid vessels:  There is expected flow signal in the  visualized portion of the common carotid artery.  There is mild  attenuation of flow signal at the carotid bifurcation which may be  secondary to flow related artifact. The internal carotid artery in  the neck demonstrates expected flow signal.     Left carotid vessels:   There is expected flow signal in the  visualized portion of the common carotid artery.  There is mild  attenuation of flow signal at the carotid bifurcation which may be  secondary to flow related artifact. The internal carotid artery in  the neck demonstrates expected flow signal.     Vertebral vessels: Left dominant. The visualized segments of the  cervical vertebral arteries demonstrate expected flow signal.     IMPRESSION:  MRI Brain:     No acute intracranial pathology.     MRA:     No flow-limiting stenosis or occlusion on MRA head and neck.   Results for orders placed or performed in visit on 07/01/20   XR CERVICAL SPINE COMPLETE 4-5 VIEWS    Narrative    MRN: [id number]  Patient Name: TI AWAD     STUDY:  SPINE, CERVICAL  MIN 4 VIEWS; ;  7/1/2020 2:55 pm     INDICATION:  persistnet neck pain left side,.     COMPARISON:  None.     ACCESSION NUMBER(S):  [id number]     ORDERING CLINICIAN:  GAVINO HINES      FINDINGS:  Minimal anterolisthesis of C4 on C5. Vertebral heights are preserved.  No substantial disc space narrowing. Mild C5-6 and C6-7 uncovertebral  spurring. Possible lower cervical foraminal narrowing. No  prevertebral swelling.     IMPRESSION:  Mild cervical spondylosis.   Results for orders placed or performed in visit on 09/03/19   EMG    Narrative    Ordered by an unspecified provider.              Assessment/Plan   Problem List Items Addressed This Visit       Complicated migraine - Primary    Overview     - Pt with history of hemiplegic migraines that affect her left side. These are often triggered by IVIG infusions for which she uses Ubrelvy for pre and post treatment. She had a worse event on 11/7 and was treated at [facility] [facility] for stroke workup. Migraine resovled after about 72 hrs with magnesium, steroids, and gabapentin.   - MRI brain and CTA head and neck without acute process.          Current Assessment & Plan     Patient returned to neurological baseline and has been stable since she left [place].   Discussed case with Dr. Bai.   Will restart Qulipta 60mg  as patient was off of it during this most recent event. Sent to  specialty.  Continue Aimovig 140 q monthly for prevention  Continue Ubrelvy and diclofenac for rescue  Follow up with Dr. Bai as scheduled in Feb           Relevant Medications    atogepant (Qulipta) 60 mg tablet tablet    Focal seizures (CMS/HCC)    Overview     No seizures since July 2023.   On vimpat and keppra          Current Assessment & Plan     Reports compliance and good control since July   Continue current meds

## 2023-11-14 NOTE — ASSESSMENT & PLAN NOTE
Patient returned to neurological baseline and has been stable since she left [place].   Discussed case with Dr. Bai.   Will restart Qulipta 60mg  as patient was off of it during this most recent event. Sent to  specialty.  Continue Aimovig 140 q monthly for prevention  Continue Ubrelvy and diclofenac for rescue  Follow up with Dr. Bai as scheduled in Feb

## 2023-11-16 ENCOUNTER — PATIENT OUTREACH (OUTPATIENT)
Dept: PRIMARY CARE | Facility: CLINIC | Age: 48
End: 2023-11-16
Payer: MEDICAID

## 2023-11-16 ENCOUNTER — PHARMACY VISIT (OUTPATIENT)
Dept: PHARMACY | Facility: CLINIC | Age: 48
End: 2023-11-16
Payer: MEDICAID

## 2023-11-16 NOTE — PROGRESS NOTES
Unable to reach patient for call back after patient's follow up appointment with PCP.   VINODM with call back number for patient to call if needed   If no voicemail available call attempts x 2 were made to contact the patient to assist with any questions or concerns patient may have.

## 2023-11-17 ENCOUNTER — APPOINTMENT (OUTPATIENT)
Dept: ENDOCRINOLOGY | Facility: CLINIC | Age: 48
End: 2023-11-17
Payer: MEDICAID

## 2023-11-19 ASSESSMENT — EXTERNAL EXAM - RIGHT EYE: OD_EXAM: NORMAL

## 2023-11-19 ASSESSMENT — CUP TO DISC RATIO
OD_RATIO: 0.15
OS_RATIO: 0.15

## 2023-11-19 ASSESSMENT — SLIT LAMP EXAM - LIDS
COMMENTS: NORMAL
COMMENTS: NORMAL

## 2023-11-19 ASSESSMENT — EXTERNAL EXAM - LEFT EYE: OS_EXAM: NORMAL

## 2023-11-20 ENCOUNTER — OFFICE VISIT (OUTPATIENT)
Dept: OPHTHALMOLOGY | Facility: CLINIC | Age: 48
End: 2023-11-20
Payer: MEDICAID

## 2023-11-20 DIAGNOSIS — H47.012 NAION (NON-ARTERITIC ANTERIOR ISCHEMIC OPTIC NEUROPATHY), LEFT EYE: Primary | ICD-10-CM

## 2023-11-20 DIAGNOSIS — H57.09 RELATIVE AFFERENT PUPILLARY DEFECT OF LEFT EYE: ICD-10-CM

## 2023-11-20 NOTE — PROGRESS NOTES
"Assessment and Plan    06/08/2021 +OKN response OD  09/30/2019 +OKN response OD    08/01/2023 MRV head, which I personally reviewed, shows no lesion.  07/29/2023 MRI brain & orbits with contrast, which I personally reviewed, shows right frontal encephalomalacia consistent with prior bolt.  Interval head imaging.    10/05/2022 CT head without contrast & CTA head & neck, by report from [place], show \" 1.   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  Overlying right-sided sherley hole.)  2.  Prominence of the ventricles and sulci for age.  \" and \"1. Similar appearing old areas of infarction involving the right and left frontal lobes extending to the convexities with underlying encephalomalacia at site of prior hemorrhage from 02/13/2022. Overlying right-sided sherley hole. Prominence of the ventricles and sulci for age. No evidence of acute infarction. No active hemorrhage. 2. No significant stenosis internal carotid arteries. 3. No significant stenosis of the intracranial vessels or evidence of aneurysm. 4. Aberrant right subclavian artery behind the esophagus with no dilation of the proximal esophagus.\"  09/25/2022 CT head without contrast, which I personally reviewed previously, shows no lesion.  04/20/2022 CT head without contrast, which I personally reviewed previously, shows right frontal encephalomalacia judged stable by Radiology.  Interval head imaging.  09/10/2021 MRI brain with contrast, which I personally reviewed previously, shows no lesion.  09/09/2021 CTA head & neck, which I personally reviewed previously, shows no lesion.  09/09/2021 CT head without contrast, which I personally reviewed previously, shows no lesion.  08/11/2021 MRI brain & orbits with contrast & MRV head, which I personally reviewed previously, show left optic atrophy with Radiology " noting “Punctate focus of enhancement seen along the left 7th-8th cranial nerve bundle at the level of the porus acusticus, indeterminate. Otherwise unremarkable MRI of the brain.”  Interval head imaging.  06/04/2021 MRI brain & orbits with contrast, which I personally reviewed previously, shows left optic atrophy.  Interval head imaging.  06/29/2017 MRI brain without contrast, which I personally reviewed previously, shows no lesion.  11/22/2007 CT head without contrast, which I personally reviewed previously, shows no lesion.    08/31/2023 lumbar puncture tube 1: , WBC 0, tube 4: RBC 6 & WBC 0, protein 91 & glucose 71.  08/11/2021 lumbar puncture opening pressure 18 cm water, tube 1: RBC 0, WBC 16 (86% L, 13% M), tube 4: RBC 0 & WBC 16 (92% L, 8% M), protein 71 & glucose 61. Cytology negative. Flow cytometry negative. Oligoclonal bands 0. IgG studies with high CSF IgG & albumin.  08/05/2020 lumbar puncture opening pressure 20 cm water, protein 68, glucose 85. MBP wnl. Oligoclonal bands POSITIVE 4.  12/26/2019 lumbar puncture no opening pressure checked per patient) tube ?: RBC 39, WBC 6 (91% L, 9% M), tube ?: RBC 38, WBC 5, protein 89, glucose 79. (via [employer] from [employer])  11/13/2019 lumbar puncture opening pressure 22 cm water, tube 1: RBC 9, WBC 4, tube 4: RBC 1 & WBC 5, protein 82 & glucose 61. Oligoclonal bands 0. IgG studies with non-specific abnormalities. HSV PCR negative.  09/20/2019 lumbar puncture opening pressure 20 cm water, RBC 1, WBC 7 (96% L, 4% M), protein 94 & glucose 78.  01/31/2015 lumbar puncture RBC 2, WBC 0, protein 104 & glucose 74.    07/27/2019 multifocal ERG with mildly reduced central responses.  Pattern VEP with OD borderline latency at 0.25 degrees and OS with severely prolonged latencies and decreased amplitudes.    Lab Results   Component Value Date/Time    SEDRATE 19 08/01/2023 1558    SEDRATE 33 (H) 08/07/2022 0322    SEDRATE 19 05/25/2022 1501    SEDRATE 3 06/05/2020 1110     SEDRATE 6 05/13/2020 1529    SEDRATE 3 03/28/2020 0629    SEDRATE 5 09/16/2019 0507    SEDRATE 8 08/19/2019 1314    CRP 0.90 08/07/2022 0322    CRP 0.35 05/25/2022 1501    CRP 0.34 09/23/2021 0618    CRP 0.71 09/21/2021 0648    CRP 0.14 07/16/2020 0944    CRP 0.10 06/05/2020 1110    CRP <0.10 05/13/2020 1529    CRP <0.10 03/28/2020 0629    CRP 3.64 (A) 11/21/2019 2157    CRP <0.10 08/19/2019 1314      Lab Results   Component Value Date/Time    CYEUVRAZ87 299 02/23/2023 0941    PTSOUXRU73 709 02/09/2021 0451    GFQVGFEV90 508 12/10/2019 1349    RCFOL 951 12/10/2019 1349      Lab Results   Component Value Date/Time    NMOAQP4 Negative 11/14/2019 1447    MOGFACS Negative 11/10/2020 1000    MOGFACS Negative 11/14/2019 1447    ACE 26 11/10/2020 1000      Tuberculosis studies  Lab Results   Component Value Date/Time    TBSIN Negative 09/22/2021 0430    TBSIN Negative 11/10/2020 1000    TBSIN Negative 11/14/2019 0531        09/18/2020 ESR 1. CRP < 0.08 mg/dL (0.8 mg/L).  08/28/2020 HbA1c HIGH 7.0. Lipid panel with LDL 64.  08/05/2020 B12 462. Folate wnl. AQP4 ab negative.  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 Suarez.  07/09/2019 BRETT > 1:640. Anti-centromere HIGH 101 (0-40). scl-70 negative. Chromatin ab IgG, anti-ribosomal ab, anti-Sarahy ab, anti-DNA ab negative.    11/20/2023 OCT RNFL OD *** & OS ***.  02/06/2023 OCT RNFL OD 92 & OS 38. (stable)  OCT macula OD normal foveal contour 255 & OS thinning RNFL miscentered wrt fovea.  10/07/2022 OCT RNFL OD 92 & OS 40. (decreased OD & stable OS)  09/23/2022 OCT RNFL OD 98 & OS 38. (increased OD & stable OS)  01/27/2022 OCT RNFL OD 88 & OS 37. (stable)  10/06/2021 OCT RNFL OD 93 & OS 39 (stable)..  08/19/2021 OCT RNFL OD 92 & OS 38. (stable)  06/08/2021 OCT RNFL OD 87 & OS 37. (stable)  01/28/2021 OCT RNFL OD 85 & OS 37. (stable)  11/06/2020 OCT RNFL OD 89 & OS 39. (stable)  07/27/2020 OCT RNFL OD 89 & OS 38. (stable)  01/07/2020 OCT RNFL OD 93 &  OS 38. (stable to mild increase OD, stable to mild decrease OS)  11/27/2019 OCT RNFL OD 84 & OS 42. (stable to mild OD decrease)  09/30/2019 OCT RNFL OD 89 & OS 41. (stable)  07/18/2019 OCT RNFL OD 90 & OS 39.  OCT macula OD normal foveal contour 256 & OS thinning of RNFL & GCL, but preserved foveal contour 238.    11/20/2023 OCT RNFL OD *** & OS ***.  02/06/2023 HVF 24-2 OD fovea 36, FL 1/17, FP 0%< FN 15%, scatter MD -7.58 & OS stimulus V, fovea 19, generalized depression.  10/07/2022 HVF 24-2 OD fovea 35, wnl MD -1.26 & OS fovea 5, generalized depression MD -30.62.  09/23/2022 HVF 24-2 OD fovea 38, scatter MD -2.67 & OS stimulus V, fovea 16, superior arcuate & inferior arcuate.  01/27/2022 HVF 24-2 OD fovea 36, wnl MD -1.67 & OS stimulus V, fovea 28, generalized reduction.  10/06/2021 HVF 24-2 OD fovea 36, scatter MD -4.76 & OS stimulus V, generalized reduction.  08/19/2021 HVF 24-2 OD fovea 39, wnl MD -2.31 & OS stimulus V, fovea 28, generalized depression.  06/08/2021 HVF Stimulus V OD fovea 34, wnl & OS stimulus V, fovea 24, generalized depression.  01/28/2021 HVF 24-2 OD fovea 40, wnl MD -0.63 & OS stimulus V, fovea 28, superior nasal step & inferior arcuate.  11/06/2020 HVF 24-2 OD fovea 32, possible inferior > superior arcuate MD -14.71 & OS stimulus V, fovea 10, generalized depression.  07/27/2020 HVF 24-2 OD fovea 39, wnl MD -2.45 & OS stimulus V, fovea 27, superior & inferior altitudinal.    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    Right eye vision is much better today with pinhole than at her recent ED visit and now is similar to other recent visits. The  left eye remains poor. I think again there is a component of refractive error. The left eye shows similar optic atrophy. She has had other episodes of right eye vision loss that recovered, and I think this one is similar are more consistent with a functional origin.    I still recommend artificial tears and regular refractive rare.    Plan    Artificial tears.  Weight loss and blood sugar regulation.  Continue with Dr. Bai.  Vasculopathic risk factor control.  Hold acetazolamide.    Follow up as previously planned in 3-6 months with HVF & OCT. (dilated 02/06/2023)

## 2023-11-24 ENCOUNTER — TELEPHONE (OUTPATIENT)
Dept: ENDOCRINOLOGY | Facility: CLINIC | Age: 48
End: 2023-11-24
Payer: MEDICAID

## 2023-11-27 ENCOUNTER — APPOINTMENT (OUTPATIENT)
Dept: ENDOCRINOLOGY | Facility: CLINIC | Age: 48
End: 2023-11-27
Payer: MEDICAID

## 2023-11-28 ENCOUNTER — SPECIALTY PHARMACY (OUTPATIENT)
Dept: PHARMACY | Facility: CLINIC | Age: 48
End: 2023-11-28

## 2023-11-29 ENCOUNTER — PHARMACY VISIT (OUTPATIENT)
Dept: PHARMACY | Facility: CLINIC | Age: 48
End: 2023-11-29
Payer: MEDICAID

## 2023-11-29 PROCEDURE — RXMED WILLOW AMBULATORY MEDICATION CHARGE

## 2023-11-30 ENCOUNTER — SPECIALTY PHARMACY (OUTPATIENT)
Dept: PHARMACY | Facility: CLINIC | Age: 48
End: 2023-11-30

## 2023-12-04 ENCOUNTER — PHARMACY VISIT (OUTPATIENT)
Dept: PHARMACY | Facility: CLINIC | Age: 48
End: 2023-12-04
Payer: MEDICAID

## 2023-12-04 DIAGNOSIS — J30.89 PERENNIAL ALLERGIC RHINITIS WITH SEASONAL VARIATION: ICD-10-CM

## 2023-12-04 DIAGNOSIS — J30.2 PERENNIAL ALLERGIC RHINITIS WITH SEASONAL VARIATION: ICD-10-CM

## 2023-12-04 PROCEDURE — RXMED WILLOW AMBULATORY MEDICATION CHARGE

## 2023-12-04 RX ORDER — FLUTICASONE PROPIONATE 50 MCG
1 SPRAY, SUSPENSION (ML) NASAL DAILY
Qty: 16 G | Refills: 0 | Status: SHIPPED | OUTPATIENT
Start: 2023-12-04 | End: 2024-01-09 | Stop reason: SDUPTHER

## 2023-12-05 DIAGNOSIS — E06.3 HYPOTHYROIDISM DUE TO HASHIMOTO'S THYROIDITIS: Primary | ICD-10-CM

## 2023-12-05 DIAGNOSIS — E03.8 HYPOTHYROIDISM DUE TO HASHIMOTO'S THYROIDITIS: Primary | ICD-10-CM

## 2023-12-05 PROCEDURE — RXMED WILLOW AMBULATORY MEDICATION CHARGE

## 2023-12-07 ENCOUNTER — PATIENT OUTREACH (OUTPATIENT)
Dept: PRIMARY CARE | Facility: CLINIC | Age: 48
End: 2023-12-07
Payer: MEDICAID

## 2023-12-08 ENCOUNTER — TELEMEDICINE CLINICAL SUPPORT (OUTPATIENT)
Dept: ENDOCRINOLOGY | Facility: CLINIC | Age: 48
End: 2023-12-08
Payer: MEDICAID

## 2023-12-08 DIAGNOSIS — Z79.4 TYPE 2 DIABETES MELLITUS WITH DIABETIC AUTONOMIC NEUROPATHY, WITH LONG-TERM CURRENT USE OF INSULIN (MULTI): Primary | ICD-10-CM

## 2023-12-08 DIAGNOSIS — E11.43 TYPE 2 DIABETES MELLITUS WITH DIABETIC AUTONOMIC NEUROPATHY, WITH LONG-TERM CURRENT USE OF INSULIN (MULTI): Primary | ICD-10-CM

## 2023-12-08 PROCEDURE — 95251 CONT GLUC MNTR ANALYSIS I&R: CPT | Performed by: INTERNAL MEDICINE

## 2023-12-08 PROCEDURE — 99211 OFF/OP EST MAY X REQ PHY/QHP: CPT | Performed by: PHARMACIST

## 2023-12-08 PROCEDURE — 95251 CONT GLUC MNTR ANALYSIS I&R: CPT | Performed by: PHARMACIST

## 2023-12-08 RX ORDER — SEMAGLUTIDE 2.68 MG/ML
2 INJECTION, SOLUTION SUBCUTANEOUS
Qty: 3 ML | Refills: 6 | Status: CANCELLED | OUTPATIENT
Start: 2023-12-08

## 2023-12-08 NOTE — PROGRESS NOTES
Clinical Pharmacy Team met with Jonathan Ayala regarding a consultation for diabetes management thanks to a referral from Dr. Marah Felix MD. Below is a summary of our conversation and recommendations:    ________________________________________________________________________      Allergies   Allergen Reactions    Acetazolamide Hives, Rash, Shortness of breath and Swelling     oral hives, redness, ABD pain    Atorvastatin Unknown     Causes muscle pain    Cefdinir Itching, Rash, Shortness of breath and Anaphylaxis     Itchy throat    Throat closing / difficulty breathing.  Took Benadryl at home.    Itchy throat      Throat closing / difficulty breathing.  Took Benadryl at home.    Ceftriaxone Anaphylaxis, Rash, Shortness of breath and Swelling     SOB    SOB hives turned red during gallbladder    Doxepin Anaphylaxis, Hives, Swelling, Angioedema and Rash     Itchy sore throat problems with swallowing    facial swelling    Itchy sore throat problems with swallowing      facial swelling    Duloxetine Anaphylaxis, Hallucinations and Rash     suicidal ideation    suicidal ideation     Other reaction(s): suicidal ideation    suicidal    Suicidal ideation    Levofloxacin Angioedema, Swelling and Rash     eyelid swelling    eyelid swelling. Patient tolerates Avelox    Levofloxacin In D5w Anaphylaxis     Moon face lips swelling just Levaquin tolerated D5W)    Nutritional Supplements Anaphylaxis     Grapes ( red dye    Prochlorperazine Anaphylaxis     HIGH Compazine involuntary muscle spasms low doses tolerated)    Red Dye Anaphylaxis    Becky Anaphylaxis and Swelling     Becky    SOB facial swelling    Rosemary Oil Anaphylaxis, Itching and Swelling     Becky  SOB facial swelling      SOB facial swelling    Strawberry Shortness of breath     White blisters in mouth      Other reaction(s): white blisters in mouth, shortness of breath    Strawberry Flavor Anaphylaxis     Allergy to strawberry fruit and juice     "Sulfa (Sulfonamide Antibiotics) Anaphylaxis, Rash, Shortness of breath and Swelling     SOB itchy hives    Other Reaction(s): rash, SOB      SOB itchy hives    Topiramate Anaphylaxis, Swelling and Angioedema     eyelid swelling    Throat swelling    eyelid swelling  Throat swelling      Throat swelling      eyelid swelling    Tree Nuts Shortness of breath     walnuts    Tree Pollen-Black Surprise Shortness of breath     Other Reaction(s): Other: See Comments      White blisters in mouth      blisters in mouth-carries an EPIPEN-\"I have gotten short of breath\"    Tree Pollen-Pecan Shortness of breath     pecans    [id number] Antimigraine Agents Anaphylaxis and Nausea Only     \"[id number]- anti migraine agents and DHE: can cause stroke per pt \"    None due to Raynaud's  ( Triptans cause a stroke)    Surprise Shortness of breath    Aripiprazole Unknown, Fever and Other     fever and tremors    Fevers and Tremors    Tremor    Aspartame Diarrhea     Blood sugar Everett, Diarrhea    Aspartame (Bulk) Diarrhea, Nausea Only and Nausea/vomiting     Other Reaction(s): nausea, diarrhea, abdominal pain      Blood sugar [place]rett, Diarrhea    Fenofibrate Other     Double vision    Gluten Itching, Other and Rash     Rashes constipation gastric discomfort    Hydrochlorothiazide Hives, Itching and Rash     hctz removed as free-text allergy and entered as [facility] allergy,1455 10/6/2007JO      itchy hives    Hydromorphone Unknown, GI intolerance and Nausea Only     Intolerance nausea instant    Iron Hives and Rash     ichy hive ( can tolerate ferrous gluconate)    Meclizine Angioedema, Other and Swelling     eyelid swelling    Swelling of the eyelids    Eyelids and face    Metformin Other     Other reaction(s): Dyspepsia, Dyspepsia    Propoxyphene Unknown and Hives     Darvocet itchy hives    Statins-Hmg-Coa Reductase Inhibitors Unknown     Double vision    Tetracyclines Hives, Itching and Rash     sulfa    Rash itching    Itchy  rash    " Thiazides Hives, Itching and Rash     itchy hives    Venlafaxine Unknown and Fever     Fevers chills    Wheat Unknown     oral blisters    Adhesive Tape-Silicones Itching and Other    Barbiturates Unknown    Dhe Unknown     Raynaud's disease    Diet Foods Diarrhea     Aspartame allergy only. Removes to many foods to interface.    Ferrous Sulfate Hives    Nsaids (Non-Steroidal Anti-Inflammatory Drug) Unknown and Nausea/vomiting    Other Unknown    Sulfonylureas Unknown    Tobramycin Unknown    Vancomycin Unknown and Hives     Niyah syndrome    Other reaction(s): Other    Red man syndrome if given fast, benadryl with.     Niyah syndrome  Other reaction(s): Other      Other reaction(s): Other      Red man syndrome if given fast, benadryl with.    Adhesive Rash    Azithromycin Hives, Itching and Rash    Betamethasone Nausea And Vomiting, Other, GI intolerance and Diarrhea     N/V/D    House Dust Rash    Metoclopramide Hcl Other    Milk Unknown, Itching and Rash     ABD pain    Prednisone Rash    Propoxyphene-Acetaminophen Hives and Rash    Sulfacetamide Sodium Rash and Swelling    Zolpidem Other and Hallucinations     Sleep walk    Other Reaction(s): insomnia and agitation      Sleep walk         Diabetes Pharmacotherapy:    Ozempic 1 mg subcutaneous once weekly  Toujeo 66 units subcutaneous once daily  Humalog with an ICR of 1:4 and ISF of 1:30  Farxiga 10 mg once daily    Patient reports tolerating this regimen well.      Lab Review  Lab Results   Component Value Date    BILITOT 0.3 08/06/2023    CALCIUM 9.5 08/06/2023    CO2 23 08/06/2023     08/06/2023    CREATININE 0.82 08/06/2023    GLUCOSE 135 (H) 08/06/2023    ALKPHOS 81 08/06/2023    K 4.2 08/06/2023    PROT 8.8 (H) 08/06/2023     08/06/2023    AST 21 08/06/2023    ALT 27 08/06/2023    BUN 17 08/06/2023    ANIONGAP 16 08/06/2023    MG 2.16 07/28/2023    PHOS 3.8 04/30/2023    LDH 95 07/17/2020    ALBUMIN 4.3 08/06/2023    LIPASE 20 04/30/2023     GFRF 89 2023    GFRMALE CANCELED 2023     Lab Results   Component Value Date    TRIG CANCELED 2023    CHOL CANCELED 2023    HDL CANCELED 2023     Lab Results   Component Value Date    HGBA1C 5.9 (H) 2023    HGBA1C 7.8 (A) 2023    HGBA1C 8.3 (A) 2022     The 10-year ASCVD risk score (Mamie SANDERSON, et al., 2019) is: 3.4%    Values used to calculate the score:      Age: 48 years      Sex: Female      Is Non- : No      Diabetic: Yes      Tobacco smoker: No      Systolic Blood Pressure: 146 mmHg      Is BP treated: Yes      HDL Cholesterol: 46.3 mg/dL      Total Cholesterol: 186 mg/dL      Monitoring   -----------------------------  [employer] Clarity  -----------------------------  Reporting period: Sat 2023 - Fri Dec 8, 2023  -----------------------------  Glucose Details  Average glucose: 193 mg/dL  Standard deviation: 41 mg/dL  GMI: 7.9%  -----------------------------  Time in Range  Very High: 9%  High: 50%  In Range: 41%  Low: 0%  Very Low: 0%    Target Range   mg/dL    -----------------------------  CGM Details  Sensor usage: 100%  Days with CGM data:         Assessment/Plan     The patient reports today for a diabetes consultation. Reviewed CGM report and discussed it with the patient. The patient is experiencing hyperglycemia throughout the day. We discussed this today. Discussed therapueitc options. Given patients hyperglycemia, BMI and insulin usage, she would benefit from a dose increase in ozempic. She reports tolerating her current dose well and is interested in this. Will send rx for ozempic 2 mg subcutaneous once weekly. We will keep all other dosages the same for now. Patient understands and is agreeable to this.    A minimum of 72 hours of CGM data was reviewed and used to make therapy decisions.       PATIENT EDUCATION/GOALS    Goals  Fasting B - 130 mg/dL  Postprandial BG: less than 180 mg/dL  A1c: less than  7%    Type of encounter: [virtual]    Provided counseling on lifestyle modifications, medications, and self-monitoring. Patient has no additional questions at this time. Pharmacy to follow up in 6 weeks. Please reach out with any questions. Thank you.       Joanna Evans, PharmD    Provider on site: Jazz Mccall CNP  Continue all meds under the continuation of care with the referring provider and clinical pharmacy team.

## 2023-12-11 ENCOUNTER — SPECIALTY PHARMACY (OUTPATIENT)
Dept: PHARMACY | Facility: CLINIC | Age: 48
End: 2023-12-11

## 2023-12-11 DIAGNOSIS — Z79.4 TYPE 2 DIABETES MELLITUS WITH DIABETIC AUTONOMIC NEUROPATHY, WITH LONG-TERM CURRENT USE OF INSULIN (MULTI): ICD-10-CM

## 2023-12-11 DIAGNOSIS — E11.43 TYPE 2 DIABETES MELLITUS WITH DIABETIC AUTONOMIC NEUROPATHY, WITH LONG-TERM CURRENT USE OF INSULIN (MULTI): ICD-10-CM

## 2023-12-12 ENCOUNTER — OFFICE VISIT (OUTPATIENT)
Dept: OPHTHALMOLOGY | Facility: CLINIC | Age: 48
End: 2023-12-12
Payer: MEDICAID

## 2023-12-12 DIAGNOSIS — G93.2 BENIGN INTRACRANIAL HYPERTENSION: ICD-10-CM

## 2023-12-12 DIAGNOSIS — H47.012 NAION (NON-ARTERITIC ANTERIOR ISCHEMIC OPTIC NEUROPATHY), LEFT EYE: Primary | ICD-10-CM

## 2023-12-12 DIAGNOSIS — H57.09 RELATIVE AFFERENT PUPILLARY DEFECT OF LEFT EYE: ICD-10-CM

## 2023-12-12 NOTE — PROGRESS NOTES
"Assessment and Plan    Assessment and Plan    06/08/2021 +OKN response OD  09/30/2019 +OKN response OD    08/01/2023 MRV head, which I personally reviewed, shows no lesion.  07/29/2023 MRI brain & orbits with contrast, which I personally reviewed, shows right frontal encephalomalacia consistent with prior bolt.  Interval head imaging.    10/05/2022 CT head without contrast & CTA head & neck, by report from [place], show \" 1.   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  Overlying right-sided sherley hole.)  2.  Prominence of the ventricles and sulci for age.  \" and \"1. Similar appearing old areas of infarction involving the right and left frontal lobes extending to the convexities with underlying encephalomalacia at site of prior hemorrhage from 02/13/2022. Overlying right-sided sherley hole. Prominence of the ventricles and sulci for age. No evidence of acute infarction. No active hemorrhage. 2. No significant stenosis internal carotid arteries. 3. No significant stenosis of the intracranial vessels or evidence of aneurysm. 4. Aberrant right subclavian artery behind the esophagus with no dilation of the proximal esophagus.\"  09/25/2022 CT head without contrast, which I personally reviewed previously, shows no lesion.  04/20/2022 CT head without contrast, which I personally reviewed previously, shows right frontal encephalomalacia judged stable by Radiology.  Interval head imaging.  09/10/2021 MRI brain with contrast, which I personally reviewed previously, shows no lesion.  09/09/2021 CTA head & neck, which I personally reviewed previously, shows no lesion.  09/09/2021 CT head without contrast, which I personally reviewed previously, shows no lesion.  08/11/2021 MRI brain & orbits with contrast & MRV head, which I personally reviewed previously, show left optic " atrophy with Radiology noting “Punctate focus of enhancement seen along the left 7th-8th cranial nerve bundle at the level of the porus acusticus, indeterminate. Otherwise unremarkable MRI of the brain.”  Interval head imaging.  06/04/2021 MRI brain & orbits with contrast, which I personally reviewed previously, shows left optic atrophy.  Interval head imaging.  06/29/2017 MRI brain without contrast, which I personally reviewed previously, shows no lesion.  11/22/2007 CT head without contrast, which I personally reviewed previously, shows no lesion.    08/31/2023 lumbar puncture tube 1: , WBC 0, tube 4: RBC 6 & WBC 0, protein 91 & glucose 71.  08/11/2021 lumbar puncture opening pressure 18 cm water, tube 1: RBC 0, WBC 16 (86% L, 13% M), tube 4: RBC 0 & WBC 16 (92% L, 8% M), protein 71 & glucose 61. Cytology negative. Flow cytometry negative. Oligoclonal bands 0. IgG studies with high CSF IgG & albumin.  08/05/2020 lumbar puncture opening pressure 20 cm water, protein 68, glucose 85. MBP wnl. Oligoclonal bands POSITIVE 4.  12/26/2019 lumbar puncture no opening pressure checked per patient) tube ?: RBC 39, WBC 6 (91% L, 9% M), tube ?: RBC 38, WBC 5, protein 89, glucose 79. (via [email or web address] from [employer])  11/13/2019 lumbar puncture opening pressure 22 cm water, tube 1: RBC 9, WBC 4, tube 4: RBC 1 & WBC 5, protein 82 & glucose 61. Oligoclonal bands 0. IgG studies with non-specific abnormalities. HSV PCR negative.  09/20/2019 lumbar puncture opening pressure 20 cm water, RBC 1, WBC 7 (96% L, 4% M), protein 94 & glucose 78.  01/31/2015 lumbar puncture RBC 2, WBC 0, protein 104 & glucose 74.    07/27/2019 multifocal ERG with mildly reduced central responses.  Pattern VEP with OD borderline latency at 0.25 degrees and OS with severely prolonged latencies and decreased amplitudes.    Lab Results   Component Value Date/Time    SEDRATE 19 08/01/2023 1558    SEDRATE 33 (H) 08/07/2022 0322    SEDRATE 19 05/25/2022 1501     SEDRATE 3 06/05/2020 1110    SEDRATE 6 05/13/2020 1529    SEDRATE 3 03/28/2020 0629    SEDRATE 5 09/16/2019 0507    SEDRATE 8 08/19/2019 1314    CRP 0.90 08/07/2022 0322    CRP 0.35 05/25/2022 1501    CRP 0.34 09/23/2021 0618    CRP 0.71 09/21/2021 0648    CRP 0.14 07/16/2020 0944    CRP 0.10 06/05/2020 1110    CRP <0.10 05/13/2020 1529    CRP <0.10 03/28/2020 0629    CRP 3.64 (A) 11/21/2019 2157    CRP <0.10 08/19/2019 1314      Lab Results   Component Value Date/Time    HAXKQGKW63 299 02/23/2023 0941    RAKNTAJP35 709 02/09/2021 0451    QQMBFHMA92 508 12/10/2019 1349    RCFOL 951 12/10/2019 1349      Lab Results   Component Value Date/Time    NMOAQP4 Negative 11/14/2019 1447    MOGFACS Negative 11/10/2020 1000    MOGFACS Negative 11/14/2019 1447    ACE 26 11/10/2020 1000      Tuberculosis studies  Lab Results   Component Value Date/Time    TBSIN Negative 09/22/2021 0430    TBSIN Negative 11/10/2020 1000    TBSIN Negative 11/14/2019 0531        09/18/2020 ESR 1. CRP < 0.08 mg/dL (0.8 mg/L).  08/28/2020 HbA1c HIGH 7.0. Lipid panel with LDL 64.  08/05/2020 B12 462. Folate wnl. AQP4 ab negative.  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 Suarez.  07/09/2019 BRETT > 1:640. Anti-centromere HIGH 101 (0-40). scl-70 negative. Chromatin ab IgG, anti-ribosomal ab, anti-Sarahy ab, anti-DNA ab negative.    02/06/2023 OCT RNFL OD 92 & OS 38. (stable)  OCT macula OD normal foveal contour 255 & OS thinning RNFL miscentered wrt fovea.  10/07/2022 OCT RNFL OD 92 & OS 40. (decreased OD & stable OS)  09/23/2022 OCT RNFL OD 98 & OS 38. (increased OD & stable OS)  01/27/2022 OCT RNFL OD 88 & OS 37. (stable)  10/06/2021 OCT RNFL OD 93 & OS 39 (stable)..  08/19/2021 OCT RNFL OD 92 & OS 38. (stable)  06/08/2021 OCT RNFL OD 87 & OS 37. (stable)  01/28/2021 OCT RNFL OD 85 & OS 37. (stable)  11/06/2020 OCT RNFL OD 89 & OS 39. (stable)  07/27/2020 OCT RNFL OD 89 & OS 38. (stable)  01/07/2020 OCT RNFL OD 93 & OS 38.  (stable to mild increase OD, stable to mild decrease OS)  11/27/2019 OCT RNFL OD 84 & OS 42. (stable to mild OD decrease)  09/30/2019 OCT RNFL OD 89 & OS 41. (stable)  07/18/2019 OCT RNFL OD 90 & OS 39.  OCT macula OD normal foveal contour 256 & OS thinning of RNFL & GCL, but preserved foveal contour 238.    02/06/2023 HVF 24-2 OD fovea 36, FL 1/17, FP 0%< FN 15%, scatter MD -7.58 & OS stimulus V, fovea 19, generalized depression.  10/07/2022 HVF 24-2 OD fovea 35, wnl MD -1.26 & OS fovea 5, generalized depression MD -30.62.  09/23/2022 HVF 24-2 OD fovea 38, scatter MD -2.67 & OS stimulus V, fovea 16, superior arcuate & inferior arcuate.  01/27/2022 HVF 24-2 OD fovea 36, wnl MD -1.67 & OS stimulus V, fovea 28, generalized reduction.  10/06/2021 HVF 24-2 OD fovea 36, scatter MD -4.76 & OS stimulus V, generalized reduction.  08/19/2021 HVF 24-2 OD fovea 39, wnl MD -2.31 & OS stimulus V, fovea 28, generalized depression.  06/08/2021 HVF Stimulus V OD fovea 34, wnl & OS stimulus V, fovea 24, generalized depression.  01/28/2021 HVF 24-2 OD fovea 40, wnl MD -0.63 & OS stimulus V, fovea 28, superior nasal step & inferior arcuate.  11/06/2020 HVF 24-2 OD fovea 32, possible inferior > superior arcuate MD -14.71 & OS stimulus V, fovea 10, generalized depression.  07/27/2020 HVF 24-2 OD fovea 39, wnl MD -2.45 & OS stimulus V, fovea 27, superior & inferior altitudinal.    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    Right eye vision is much better today with pinhole than at her recent ED visit and now is similar to other recent visits. The left eye remains poor. I think again there is  a component of refractive error. The left eye shows similar optic atrophy. She has had other episodes of right eye vision loss that recovered, and I think this one is similar are more consistent with a functional origin.    I still recommend artificial tears and regular refractive rare.    Plan    Artificial tears.  Weight loss and blood sugar regulation.  Continue with Dr. Bai.  Vasculopathic risk factor control.  Hold acetazolamide.    Follow up as previously planned in 3-6 months with HVF & OCT. (dilated 02/06/2023)

## 2023-12-13 ENCOUNTER — PHARMACY VISIT (OUTPATIENT)
Dept: PHARMACY | Facility: CLINIC | Age: 48
End: 2023-12-13
Payer: MEDICAID

## 2023-12-13 DIAGNOSIS — E11.9 TYPE 2 DIABETES MELLITUS WITHOUT COMPLICATION, UNSPECIFIED WHETHER LONG TERM INSULIN USE (MULTI): ICD-10-CM

## 2023-12-13 RX ORDER — SEMAGLUTIDE 1.34 MG/ML
1 INJECTION, SOLUTION SUBCUTANEOUS
Qty: 3 ML | Refills: 5 | OUTPATIENT
Start: 2023-12-13

## 2023-12-13 RX ORDER — SEMAGLUTIDE 2.68 MG/ML
2 INJECTION, SOLUTION SUBCUTANEOUS
Qty: 3 ML | Refills: 11 | Status: SHIPPED | OUTPATIENT
Start: 2023-12-13 | End: 2024-01-18 | Stop reason: SINTOL

## 2023-12-13 NOTE — TELEPHONE ENCOUNTER
Jonathan Ayala   Called requesting new script for 2mg Ozempic as discussed with PharmD  . Order pended to provider . Prior authorization for new dose submitted. Geovanna Evangelista LPN

## 2023-12-15 ENCOUNTER — PHARMACY VISIT (OUTPATIENT)
Dept: PHARMACY | Facility: CLINIC | Age: 48
End: 2023-12-15

## 2023-12-22 ENCOUNTER — DOCUMENTATION (OUTPATIENT)
Dept: PRIMARY CARE | Facility: CLINIC | Age: 48
End: 2023-12-22
Payer: MEDICAID

## 2023-12-22 NOTE — PROGRESS NOTES
Pt set follow up with a provider from a different section that does not participate in TCM program. Pt disenrolled from TCM program at this time.

## 2023-12-27 ENCOUNTER — APPOINTMENT (OUTPATIENT)
Dept: PRIMARY CARE | Facility: CLINIC | Age: 48
End: 2023-12-27
Payer: MEDICAID

## 2024-01-02 PROCEDURE — RXMED WILLOW AMBULATORY MEDICATION CHARGE

## 2024-01-03 ENCOUNTER — PHARMACY VISIT (OUTPATIENT)
Dept: PHARMACY | Facility: CLINIC | Age: 49
End: 2024-01-03
Payer: MEDICAID

## 2024-01-03 ENCOUNTER — LAB (OUTPATIENT)
Dept: LAB | Facility: LAB | Age: 49
End: 2024-01-03
Payer: MEDICAID

## 2024-01-03 DIAGNOSIS — E06.3 HYPOTHYROIDISM DUE TO HASHIMOTO'S THYROIDITIS: ICD-10-CM

## 2024-01-03 DIAGNOSIS — E03.8 HYPOTHYROIDISM DUE TO HASHIMOTO'S THYROIDITIS: ICD-10-CM

## 2024-01-03 LAB
T4 FREE SERPL-MCNC: 1.24 NG/DL (ref 0.78–1.48)
TSH SERPL-ACNC: 0.9 MIU/L (ref 0.44–3.98)

## 2024-01-03 PROCEDURE — 84439 ASSAY OF FREE THYROXINE: CPT

## 2024-01-03 PROCEDURE — 84443 ASSAY THYROID STIM HORMONE: CPT

## 2024-01-03 PROCEDURE — 36415 COLL VENOUS BLD VENIPUNCTURE: CPT

## 2024-01-04 ENCOUNTER — PATIENT MESSAGE (OUTPATIENT)
Dept: PRIMARY CARE | Facility: CLINIC | Age: 49
End: 2024-01-04

## 2024-01-04 ENCOUNTER — TELEMEDICINE (OUTPATIENT)
Dept: PRIMARY CARE | Facility: CLINIC | Age: 49
End: 2024-01-04
Payer: MEDICAID

## 2024-01-04 ENCOUNTER — APPOINTMENT (OUTPATIENT)
Dept: OPHTHALMOLOGY | Facility: CLINIC | Age: 49
End: 2024-01-04
Payer: MEDICAID

## 2024-01-04 VITALS
WEIGHT: 222.9 LBS | HEART RATE: 95 BPM | OXYGEN SATURATION: 95 % | DIASTOLIC BLOOD PRESSURE: 73 MMHG | SYSTOLIC BLOOD PRESSURE: 118 MMHG | HEIGHT: 62 IN | TEMPERATURE: 100.9 F | BODY MASS INDEX: 41.02 KG/M2

## 2024-01-04 DIAGNOSIS — F33.42 MAJOR DEPRESSIVE DISORDER, RECURRENT, IN FULL REMISSION WITH ANXIOUS DISTRESS (CMS-HCC): ICD-10-CM

## 2024-01-04 DIAGNOSIS — K31.84 DIABETIC GASTROPARESIS ASSOCIATED WITH TYPE 2 DIABETES MELLITUS (MULTI): ICD-10-CM

## 2024-01-04 DIAGNOSIS — G93.2 BENIGN INTRACRANIAL HYPERTENSION: ICD-10-CM

## 2024-01-04 DIAGNOSIS — K21.00 GASTROESOPHAGEAL REFLUX DISEASE WITH ESOPHAGITIS WITHOUT HEMORRHAGE: ICD-10-CM

## 2024-01-04 DIAGNOSIS — J45.40 MODERATE PERSISTENT ALLERGIC ASTHMA (HHS-HCC): ICD-10-CM

## 2024-01-04 DIAGNOSIS — M35.01 SJOGREN'S SYNDROME WITH KERATOCONJUNCTIVITIS SICCA (MULTI): ICD-10-CM

## 2024-01-04 DIAGNOSIS — G47.33 OSA ON CPAP: ICD-10-CM

## 2024-01-04 DIAGNOSIS — E11.43 DIABETIC GASTROPARESIS ASSOCIATED WITH TYPE 2 DIABETES MELLITUS (MULTI): ICD-10-CM

## 2024-01-04 DIAGNOSIS — D83.9 CVID (COMMON VARIABLE IMMUNODEFICIENCY) (MULTI): Primary | ICD-10-CM

## 2024-01-04 DIAGNOSIS — D83.9 CVID (COMMON VARIABLE IMMUNODEFICIENCY) (MULTI): ICD-10-CM

## 2024-01-04 DIAGNOSIS — G43.109 COMPLICATED MIGRAINE: ICD-10-CM

## 2024-01-04 DIAGNOSIS — E11.43 TYPE 2 DIABETES MELLITUS WITH DIABETIC AUTONOMIC NEUROPATHY, WITH LONG-TERM CURRENT USE OF INSULIN (MULTI): ICD-10-CM

## 2024-01-04 DIAGNOSIS — E66.01 CLASS 3 SEVERE OBESITY DUE TO EXCESS CALORIES WITH SERIOUS COMORBIDITY AND BODY MASS INDEX (BMI) OF 40.0 TO 44.9 IN ADULT (MULTI): ICD-10-CM

## 2024-01-04 DIAGNOSIS — Z79.4 TYPE 2 DIABETES MELLITUS WITH DIABETIC AUTONOMIC NEUROPATHY, WITH LONG-TERM CURRENT USE OF INSULIN (MULTI): ICD-10-CM

## 2024-01-04 PROBLEM — K22.0 ACHALASIA OF ESOPHAGUS: Status: ACTIVE | Noted: 2024-01-04

## 2024-01-04 PROBLEM — H53.9 VISION ABNORMALITIES: Status: RESOLVED | Noted: 2023-08-29 | Resolved: 2024-01-04

## 2024-01-04 PROBLEM — M34.9 LIMITED SCLERODERMA (MULTI): Status: RESOLVED | Noted: 2018-04-13 | Resolved: 2024-01-04

## 2024-01-04 PROBLEM — U07.1 COVID-19 VIRUS INFECTION: Status: RESOLVED | Noted: 2023-04-19 | Resolved: 2024-01-04

## 2024-01-04 PROBLEM — E27.40 ADRENAL INSUFFICIENCY (MULTI): Status: RESOLVED | Noted: 2023-06-28 | Resolved: 2024-01-04

## 2024-01-04 PROBLEM — B37.0 THRUSH OF MOUTH AND ESOPHAGUS (MULTI): Status: RESOLVED | Noted: 2023-06-28 | Resolved: 2024-01-04

## 2024-01-04 PROBLEM — Z98.890 S/P BALLOON DILATATION OF ESOPHAGEAL STRICTURE: Status: ACTIVE | Noted: 2023-04-19

## 2024-01-04 PROBLEM — H54.7 FUNCTIONAL VISION PROBLEM: Status: RESOLVED | Noted: 2023-08-29 | Resolved: 2024-01-04

## 2024-01-04 PROBLEM — R56.9 FOCAL SEIZURES (MULTI): Status: RESOLVED | Noted: 2023-04-19 | Resolved: 2024-01-04

## 2024-01-04 PROBLEM — D50.9 IRON DEFICIENCY ANEMIA: Status: RESOLVED | Noted: 2017-11-14 | Resolved: 2024-01-04

## 2024-01-04 PROBLEM — B37.81 THRUSH OF MOUTH AND ESOPHAGUS (MULTI): Status: RESOLVED | Noted: 2023-06-28 | Resolved: 2024-01-04

## 2024-01-04 PROBLEM — E28.2 POLYCYSTIC DISEASE, OVARIES: Status: RESOLVED | Noted: 2020-08-11 | Resolved: 2024-01-04

## 2024-01-04 PROCEDURE — 99214 OFFICE O/P EST MOD 30 MIN: CPT | Performed by: INTERNAL MEDICINE

## 2024-01-04 RX ORDER — PREDNISONE 20 MG/1
TABLET ORAL
Qty: 18 TABLET | Refills: 0 | Status: SHIPPED | OUTPATIENT
Start: 2024-01-04 | End: 2024-01-05 | Stop reason: SDUPTHER

## 2024-01-04 RX ORDER — CLARITHROMYCIN 500 MG/1
500 TABLET, FILM COATED ORAL 2 TIMES DAILY
Qty: 10 TABLET | Refills: 0 | Status: SHIPPED | OUTPATIENT
Start: 2024-01-04 | End: 2024-01-09

## 2024-01-04 RX ORDER — PANTOPRAZOLE SODIUM 40 MG/1
40 TABLET, DELAYED RELEASE ORAL DAILY
Qty: 30 TABLET | Refills: 5 | Status: SHIPPED | OUTPATIENT
Start: 2024-01-04 | End: 2024-01-05 | Stop reason: SDUPTHER

## 2024-01-04 ASSESSMENT — ANXIETY QUESTIONNAIRES
5. BEING SO RESTLESS THAT IT IS HARD TO SIT STILL: NOT AT ALL
7. FEELING AFRAID AS IF SOMETHING AWFUL MIGHT HAPPEN: NOT AT ALL
1. FEELING NERVOUS, ANXIOUS, OR ON EDGE: NOT AT ALL
GAD7 TOTAL SCORE: 0
2. NOT BEING ABLE TO STOP OR CONTROL WORRYING: NOT AT ALL
4. TROUBLE RELAXING: NOT AT ALL
3. WORRYING TOO MUCH ABOUT DIFFERENT THINGS: NOT AT ALL
6. BECOMING EASILY ANNOYED OR IRRITABLE: NOT AT ALL

## 2024-01-04 ASSESSMENT — ENCOUNTER SYMPTOMS
DEPRESSION: 0
CHEST TIGHTNESS: 1
FATIGUE: 1
TROUBLE SWALLOWING: 1
OCCASIONAL FEELINGS OF UNSTEADINESS: 1
COUGH: 1
LOSS OF SENSATION IN FEET: 0
FEVER: 1
SINUS PAIN: 1
WHEEZING: 1

## 2024-01-04 ASSESSMENT — COLUMBIA-SUICIDE SEVERITY RATING SCALE - C-SSRS
6. HAVE YOU EVER DONE ANYTHING, STARTED TO DO ANYTHING, OR PREPARED TO DO ANYTHING TO END YOUR LIFE?: NO
2. HAVE YOU ACTUALLY HAD ANY THOUGHTS OF KILLING YOURSELF?: NO
1. IN THE PAST MONTH, HAVE YOU WISHED YOU WERE DEAD OR WISHED YOU COULD GO TO SLEEP AND NOT WAKE UP?: NO

## 2024-01-04 ASSESSMENT — PATIENT HEALTH QUESTIONNAIRE - PHQ9
2. FEELING DOWN, DEPRESSED OR HOPELESS: NOT AT ALL
SUM OF ALL RESPONSES TO PHQ9 QUESTIONS 1 AND 2: 0
1. LITTLE INTEREST OR PLEASURE IN DOING THINGS: NOT AT ALL

## 2024-01-04 ASSESSMENT — PAIN SCALES - GENERAL: PAINLEVEL: 0-NO PAIN

## 2024-01-04 NOTE — PROGRESS NOTES
"Subjective   Reason for Visit: Jonathan Ayala is an 48 y.o. female here for a virtual follow-up visit       Past Medical, Surgical, and Family History reviewed and updated in chart.    Reviewed all medications by prescribing practitioner or clinical pharmacist (such as prescriptions, OTCs, herbal therapies and supplements) and documented in the medical record.    47 year old female patient with chronic history of esophageal dysphagia to solids s/p empirical dilations Q6 months with partial improvement, last EGD with dilation was 1/3/24  .Outpatient manometry with features of EGJOO. Planned for outpatient End[facility], however, admitted for progressive symptom        Patient Care Team:  Isidoro Mensah DO as PCP - General  RAUL Retana-CNP as PCP - [facility] PCP     Review of Systems   Constitutional:  Positive for fatigue and fever.   HENT:  Positive for sinus pain and trouble swallowing.    Respiratory:  Positive for cough, chest tightness and wheezing.        Objective   Vitals:  /73 (BP Location: Right arm, Patient Position: Sitting, BP Cuff Size: Adult)   Pulse 95   Temp 38.3 °C (100.9 °F) (Oral)   Ht 1.575 m (5' 2\")   Wt 101 kg (222 lb 14.4 oz)   SpO2 95%   BMI 40.77 kg/m²       Physical Exam    Assessment/Plan   Problem List Items Addressed This Visit    None       Patient was identified as a fall risk. Risk prevention instructions provided.  "

## 2024-01-05 RX ORDER — PREDNISONE 20 MG/1
TABLET ORAL
Qty: 18 TABLET | Refills: 0 | Status: SHIPPED | OUTPATIENT
Start: 2024-01-05 | End: 2024-01-18 | Stop reason: ALTCHOICE

## 2024-01-05 RX ORDER — PANTOPRAZOLE SODIUM 40 MG/1
40 TABLET, DELAYED RELEASE ORAL DAILY
Qty: 30 TABLET | Refills: 5 | Status: SHIPPED | OUTPATIENT
Start: 2024-01-05 | End: 2024-07-03

## 2024-01-05 NOTE — ASSESSMENT & PLAN NOTE
Patient with recurrent bronchial Sino bronchial infections with multiple allergies patient with recent dental inflammation infection with drainage prescribed penicillin 500 mg 4 times a day for 7 days to cover for atypical lower respiratory tract infection we will prescribe Biaxin 500 mg twice a day for 5 days will also prescribe course of prednisone with taper in the setting of asthmatic bronchitis reevaluate with next appointment

## 2024-01-08 NOTE — PROGRESS NOTES
"Assessment and Plan    06/08/2021 +OKN response OD  09/30/2019 +OKN response OD    11/07/2023 MRI brain without contrast, by report from [facility], shows \"No acute intracranial abnormality including no evidence of an acute or recent brain parenchymal infarct. \"  11/07/2023 CTA head & neck & CT head without contrast, by report from [facility], show \"No acute abnormality of the cervical and intracranial vasculature.\" And \"No evidence of an acute intracranial process.     Focal RIGHT frontal lobe encephalomalacia deep to a sherley hole, new from   02/25/2020. \"  08/01/2023 MRV head, which I personally reviewed, shows no lesion.  07/29/2023 MRI brain & orbits with contrast, which I personally reviewed, shows right frontal encephalomalacia consistent with prior bolt.  Interval head imaging.    10/05/2022 CT head without contrast & CTA head & neck, by report from [place], show \" 1.   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  Overlying right-sided sherley hole.)  2.  Prominence of the ventricles and sulci for age.  \" and \"1. Similar appearing old areas of infarction involving the right and left frontal lobes extending to the convexities with underlying encephalomalacia at site of prior hemorrhage from 02/13/2022. Overlying right-sided sherley hole. Prominence of the ventricles and sulci for age. No evidence of acute infarction. No active hemorrhage. 2. No significant stenosis internal carotid arteries. 3. No significant stenosis of the intracranial vessels or evidence of aneurysm. 4. Aberrant right subclavian artery behind the esophagus with no dilation of the proximal esophagus.\"  09/25/2022 CT head without contrast, which I personally reviewed previously, shows no lesion.  04/20/2022 CT head without contrast, which I personally reviewed previously, shows right " frontal encephalomalacia judged stable by Radiology.  Interval head imaging.  09/10/2021 MRI brain with contrast, which I personally reviewed previously, shows no lesion.  09/09/2021 CTA head & neck, which I personally reviewed previously, shows no lesion.  09/09/2021 CT head without contrast, which I personally reviewed previously, shows no lesion.  08/11/2021 MRI brain & orbits with contrast & MRV head, which I personally reviewed previously, show left optic atrophy with Radiology noting “Punctate focus of enhancement seen along the left 7th-8th cranial nerve bundle at the level of the porus acusticus, indeterminate. Otherwise unremarkable MRI of the brain.”  Interval head imaging.  06/04/2021 MRI brain & orbits with contrast, which I personally reviewed previously, shows left optic atrophy.  Interval head imaging.  06/29/2017 MRI brain without contrast, which I personally reviewed previously, shows no lesion.  11/22/2007 CT head without contrast, which I personally reviewed previously, shows no lesion.    08/31/2023 lumbar puncture tube 1: , WBC 0, tube 4: RBC 6 & WBC 0, protein 91 & glucose 71.  08/11/2021 lumbar puncture opening pressure 18 cm water, tube 1: RBC 0, WBC 16 (86% L, 13% M), tube 4: RBC 0 & WBC 16 (92% L, 8% M), protein 71 & glucose 61. Cytology negative. Flow cytometry negative. Oligoclonal bands 0. IgG studies with high CSF IgG & albumin.  08/05/2020 lumbar puncture opening pressure 20 cm water, protein 68, glucose 85. MBP wnl. Oligoclonal bands POSITIVE 4.  12/26/2019 lumbar puncture no opening pressure checked per patient) tube ?: RBC 39, WBC 6 (91% L, 9% M), tube ?: RBC 38, WBC 5, protein 89, glucose 79. (via [employer] from [employer])  11/13/2019 lumbar puncture opening pressure 22 cm water, tube 1: RBC 9, WBC 4, tube 4: RBC 1 & WBC 5, protein 82 & glucose 61. Oligoclonal bands 0. IgG studies with non-specific abnormalities. HSV PCR negative.  09/20/2019 lumbar puncture opening pressure 20  cm water, RBC 1, WBC 7 (96% L, 4% M), protein 94 & glucose 78.  01/31/2015 lumbar puncture RBC 2, WBC 0, protein 104 & glucose 74.    07/27/2019 multifocal ERG with mildly reduced central responses.  Pattern VEP with OD borderline latency at 0.25 degrees and OS with severely prolonged latencies and decreased amplitudes.    Lab Results   Component Value Date/Time    SEDRATE 19 08/01/2023 1558    SEDRATE 33 (H) 08/07/2022 0322    SEDRATE 19 05/25/2022 1501    SEDRATE 3 06/05/2020 1110    SEDRATE 6 05/13/2020 1529    SEDRATE 3 03/28/2020 0629    SEDRATE 5 09/16/2019 0507    SEDRATE 8 08/19/2019 1314    CRP 0.90 08/07/2022 0322    CRP 0.35 05/25/2022 1501    CRP 0.34 09/23/2021 0618    CRP 0.71 09/21/2021 0648    CRP 0.14 07/16/2020 0944    CRP 0.10 06/05/2020 1110    CRP <0.10 05/13/2020 1529    CRP <0.10 03/28/2020 0629    CRP 3.64 (A) 11/21/2019 2157    CRP <0.10 08/19/2019 1314      Lab Results   Component Value Date/Time    BBLFNIJH95 299 02/23/2023 0941    HLHAQAMH49 709 02/09/2021 0451    VBFONOIA18 508 12/10/2019 1349    RCFOL 951 12/10/2019 1349      Lab Results   Component Value Date/Time    NMOAQP4 Negative 11/14/2019 1447    MOGFACS Negative 11/10/2020 1000    MOGFACS Negative 11/14/2019 1447    ACE 26 11/10/2020 1000      Tuberculosis studies  Lab Results   Component Value Date/Time    TBSIN Negative 09/22/2021 0430    TBSIN Negative 11/10/2020 1000    TBSIN Negative 11/14/2019 0531      12/04/2023 ESR 10. CRP <0.3 mg/dL.  10/01/2023 syphilis non-reactive (NR).  09/18/2020 ESR 1. CRP < 0.08 mg/dL (0.8 mg/L).  08/28/2020 HbA1c HIGH 7.0. Lipid panel with LDL 64.  08/05/2020 B12 462. Folate wnl. AQP4 ab negative.  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 Suarez.  07/09/2019 BRETT > 1:640. Anti-centromere HIGH 101 (0-40). scl-70 negative. Chromatin ab IgG, anti-ribosomal ab, anti-Sarahy ab, anti-DNA ab negative.    1/9/2024 OCT RNFL OD 89 & OS 36. (Stable)  02/06/2023 OCT RNFL OD 92 & OS  38. (stable)  OCT macula OD normal foveal contour 255 & OS thinning RNFL miscentered wrt fovea.  10/07/2022 OCT RNFL OD 92 & OS 40. (decreased OD & stable OS)  09/23/2022 OCT RNFL OD 98 & OS 38. (increased OD & stable OS)  01/27/2022 OCT RNFL OD 88 & OS 37. (stable)  10/06/2021 OCT RNFL OD 93 & OS 39 (stable)..  08/19/2021 OCT RNFL OD 92 & OS 38. (stable)  06/08/2021 OCT RNFL OD 87 & OS 37. (stable)  01/28/2021 OCT RNFL OD 85 & OS 37. (stable)  11/06/2020 OCT RNFL OD 89 & OS 39. (stable)  07/27/2020 OCT RNFL OD 89 & OS 38. (stable)  01/07/2020 OCT RNFL OD 93 & OS 38. (stable to mild increase OD, stable to mild decrease OS)  11/27/2019 OCT RNFL OD 84 & OS 42. (stable to mild OD decrease)  09/30/2019 OCT RNFL OD 89 & OS 41. (stable)  07/18/2019 OCT RNFL OD 90 & OS 39.  OCT macula OD normal foveal contour 256 & OS thinning of RNFL & GCL, but preserved foveal contour 238.    1/9/2024 HVF 24-2 OD wnl MD -0.94 & OS generalized depression MD -32.10.  02/06/2023 HVF 24-2 OD fovea 36, FL 1/17, FP 0%< FN 15%, scatter MD -7.58 & OS stimulus V, fovea 19, generalized depression.  10/07/2022 HVF 24-2 OD fovea 35, wnl MD -1.26 & OS fovea 5, generalized depression MD -30.62.  09/23/2022 HVF 24-2 OD fovea 38, scatter MD -2.67 & OS stimulus V, fovea 16, superior arcuate & inferior arcuate.  01/27/2022 HVF 24-2 OD fovea 36, wnl MD -1.67 & OS stimulus V, fovea 28, generalized reduction.  10/06/2021 HVF 24-2 OD fovea 36, scatter MD -4.76 & OS stimulus V, generalized reduction.  08/19/2021 HVF 24-2 OD fovea 39, wnl MD -2.31 & OS stimulus V, fovea 28, generalized depression.  06/08/2021 HVF Stimulus V OD fovea 34, wnl & OS stimulus V, fovea 24, generalized depression.  01/28/2021 HVF 24-2 OD fovea 40, wnl MD -0.63 & OS stimulus V, fovea 28, superior nasal step & inferior arcuate.  11/06/2020 HVF 24-2 OD fovea 32, possible inferior > superior arcuate MD -14.71 & OS stimulus V, fovea 10, generalized depression.  07/27/2020 HVF 24-2 OD fovea  39, wnl MD -2.45 & OS stimulus V, fovea 27, superior & inferior altitudinal.    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    Right eye vision today is improved form our last visit. I think more recent problems are refractive given the lack of optic nerve changes, good acuity with pinhole, reassuring visual field testing and good color vision. The left vision loss and optic atrophy remain stable and consistent with prior non-arteritic anterior ischemic optic neuropathy.    I recommend artificial tears and regular refractive rare.    Plan    Artificial tears.  Weight loss and blood sugar regulation.  Continue with Dr. Bai.  Vasculopathic risk factor control.  Hold acetazolamide.    Follow up in 6 months with HVF & OCT. (dilated 02/06/2023)

## 2024-01-09 ENCOUNTER — OFFICE VISIT (OUTPATIENT)
Dept: OPHTHALMOLOGY | Facility: CLINIC | Age: 49
End: 2024-01-09
Payer: MEDICAID

## 2024-01-09 ENCOUNTER — TELEPHONE (OUTPATIENT)
Dept: ENDOCRINOLOGY | Facility: CLINIC | Age: 49
End: 2024-01-09

## 2024-01-09 DIAGNOSIS — E04.1 THYROID NODULE: ICD-10-CM

## 2024-01-09 DIAGNOSIS — H47.20 OPTIC ATROPHY: Primary | ICD-10-CM

## 2024-01-09 DIAGNOSIS — J30.2 PERENNIAL ALLERGIC RHINITIS WITH SEASONAL VARIATION: ICD-10-CM

## 2024-01-09 DIAGNOSIS — J30.89 PERENNIAL ALLERGIC RHINITIS WITH SEASONAL VARIATION: ICD-10-CM

## 2024-01-09 DIAGNOSIS — K58.0 IRRITABLE BOWEL SYNDROME WITH DIARRHEA: Primary | ICD-10-CM

## 2024-01-09 DIAGNOSIS — I10 BENIGN ESSENTIAL HYPERTENSION: ICD-10-CM

## 2024-01-09 DIAGNOSIS — G93.2 BENIGN INTRACRANIAL HYPERTENSION: ICD-10-CM

## 2024-01-09 PROCEDURE — 99214 OFFICE O/P EST MOD 30 MIN: CPT | Performed by: PSYCHIATRY & NEUROLOGY

## 2024-01-09 PROCEDURE — 92133 CPTRZD OPH DX IMG PST SGM ON: CPT | Performed by: PSYCHIATRY & NEUROLOGY

## 2024-01-09 PROCEDURE — 92083 EXTENDED VISUAL FIELD XM: CPT | Performed by: PSYCHIATRY & NEUROLOGY

## 2024-01-09 RX ORDER — FOLIC ACID 1 MG/1
1 TABLET ORAL DAILY
Qty: 90 TABLET | Refills: 3 | Status: SHIPPED | OUTPATIENT
Start: 2024-01-09

## 2024-01-09 RX ORDER — FUROSEMIDE 20 MG/1
20 TABLET ORAL 2 TIMES DAILY
Qty: 180 TABLET | Refills: 3 | Status: SHIPPED | OUTPATIENT
Start: 2024-01-09

## 2024-01-09 RX ORDER — FLUTICASONE PROPIONATE 50 MCG
1 SPRAY, SUSPENSION (ML) NASAL DAILY
Qty: 16 G | Refills: 0 | Status: SHIPPED | OUTPATIENT
Start: 2024-01-09

## 2024-01-09 RX ORDER — PROPRANOLOL HYDROCHLORIDE 10 MG/1
10 TABLET ORAL 3 TIMES DAILY
Qty: 90 TABLET | Refills: 1 | Status: SHIPPED | OUTPATIENT
Start: 2024-01-09 | End: 2024-04-04 | Stop reason: SDUPTHER

## 2024-01-09 ASSESSMENT — CONF VISUAL FIELD
OS_SUPERIOR_NASAL_RESTRICTION: 1
OD_NORMAL: 1
OS_SUPERIOR_TEMPORAL_RESTRICTION: 1
OS_INFERIOR_NASAL_RESTRICTION: 1
OD_INFERIOR_TEMPORAL_RESTRICTION: 0
OD_SUPERIOR_NASAL_RESTRICTION: 0
OD_SUPERIOR_TEMPORAL_RESTRICTION: 0
OD_INFERIOR_NASAL_RESTRICTION: 0
OS_INFERIOR_TEMPORAL_RESTRICTION: 1

## 2024-01-09 ASSESSMENT — ENCOUNTER SYMPTOMS
MUSCULOSKELETAL NEGATIVE: 0
ALLERGIC/IMMUNOLOGIC NEGATIVE: 0
PSYCHIATRIC NEGATIVE: 0
GASTROINTESTINAL NEGATIVE: 0
HEMATOLOGIC/LYMPHATIC NEGATIVE: 0
ENDOCRINE NEGATIVE: 0
EYES NEGATIVE: 1
CONSTITUTIONAL NEGATIVE: 0
RESPIRATORY NEGATIVE: 0
CARDIOVASCULAR NEGATIVE: 0
NEUROLOGICAL NEGATIVE: 0

## 2024-01-09 ASSESSMENT — VISUAL ACUITY
METHOD: SNELLEN - LINEAR
OD_PH_CC: 20/20-1
OS_CC: 20/200-2
OD_CC: 20/25-3

## 2024-01-09 ASSESSMENT — SLIT LAMP EXAM - LIDS
COMMENTS: NORMAL
COMMENTS: NORMAL

## 2024-01-09 ASSESSMENT — EXTERNAL EXAM - RIGHT EYE: OD_EXAM: NORMAL

## 2024-01-09 ASSESSMENT — TONOMETRY
OD_IOP_MMHG: 19
OS_IOP_MMHG: 20
IOP_METHOD: TONOPEN

## 2024-01-09 ASSESSMENT — EXTERNAL EXAM - LEFT EYE: OS_EXAM: NORMAL

## 2024-01-09 ASSESSMENT — CUP TO DISC RATIO
OS_RATIO: 0.15
OD_RATIO: 0.15

## 2024-01-09 NOTE — TELEPHONE ENCOUNTER
Patient requesting lab order for thyroid T# - Orders pended to provider. Geovanna Evangelista LPN     Arava Counseling:  Patient counseled regarding adverse effects of Arava including but not limited to nausea, vomiting, abnormalities in liver function tests. Patients may develop mouth sores, rash, diarrhea, and abnormalities in blood counts. The patient understands that monitoring is required including LFTs and blood counts.  There is a rare possibility of scarring of the liver and lung problems that can occur when taking methotrexate. Persistent nausea, loss of appetite, pale stools, dark urine, cough, and shortness of breath should be reported immediately. Patient advised to discontinue Arava treatment and consult with a physician prior to attempting conception. The patient will have to undergo a treatment to eliminate Arava from the body prior to conception.

## 2024-01-09 NOTE — TELEPHONE ENCOUNTER
Prior authorization approval received for semaglutide . Coverage dates from 12/13/2023-08/12/2024. Geovanna Evangelista LPN

## 2024-01-10 ENCOUNTER — SPECIALTY PHARMACY (OUTPATIENT)
Dept: PHARMACY | Facility: CLINIC | Age: 49
End: 2024-01-10

## 2024-01-10 PROCEDURE — RXMED WILLOW AMBULATORY MEDICATION CHARGE

## 2024-01-11 ENCOUNTER — APPOINTMENT (OUTPATIENT)
Dept: NEUROLOGY | Facility: CLINIC | Age: 49
End: 2024-01-11
Payer: MEDICAID

## 2024-01-12 ENCOUNTER — TELEMEDICINE CLINICAL SUPPORT (OUTPATIENT)
Dept: ENDOCRINOLOGY | Facility: CLINIC | Age: 49
End: 2024-01-12
Payer: MEDICAID

## 2024-01-12 DIAGNOSIS — Z79.4 TYPE 2 DIABETES MELLITUS WITH DIABETIC AUTONOMIC NEUROPATHY, WITH LONG-TERM CURRENT USE OF INSULIN (MULTI): Primary | ICD-10-CM

## 2024-01-12 DIAGNOSIS — E11.43 TYPE 2 DIABETES MELLITUS WITH DIABETIC AUTONOMIC NEUROPATHY, WITH LONG-TERM CURRENT USE OF INSULIN (MULTI): Primary | ICD-10-CM

## 2024-01-12 DIAGNOSIS — E04.1 THYROID NODULE: Primary | ICD-10-CM

## 2024-01-12 RX ORDER — LEVOTHYROXINE SODIUM 50 UG/1
75 TABLET ORAL
COMMUNITY
Start: 2024-01-10 | End: 2024-01-12 | Stop reason: SDUPTHER

## 2024-01-12 RX ORDER — LEVOTHYROXINE SODIUM 50 UG/1
75 TABLET ORAL
Qty: 45 TABLET | Refills: 11 | Status: SHIPPED | OUTPATIENT
Start: 2024-01-12 | End: 2024-03-14 | Stop reason: SDUPTHER

## 2024-01-12 NOTE — TELEPHONE ENCOUNTER
Patient requesting new script for Synthroid 50 mcg , order pended to provider. Geovanna Evangelista LPN

## 2024-01-12 NOTE — PROGRESS NOTES
Clinical Pharmacy Team met with Jonathan Ayala regarding a consultation for diabetes management thanks to a referral from Dr. Marah Felix MD. Below is a summary of our conversation and recommendations:    ________________________________________________________________________      Allergies   Allergen Reactions    Acetazolamide Hives, Rash, Shortness of breath and Swelling     oral hives, redness, ABD pain    Atorvastatin Unknown     Causes muscle pain    Cefdinir Itching, Rash, Shortness of breath and Anaphylaxis     Itchy throat    Throat closing / difficulty breathing.  Took Benadryl at home.    Itchy throat      Throat closing / difficulty breathing.  Took Benadryl at home.    Ceftriaxone Anaphylaxis, Rash, Shortness of breath and Swelling     SOB    SOB hives turned red during gallbladder    Doxepin Anaphylaxis, Hives, Swelling, Angioedema and Rash     Itchy sore throat problems with swallowing    facial swelling    Itchy sore throat problems with swallowing      facial swelling    Duloxetine Anaphylaxis, Hallucinations and Rash     suicidal ideation    suicidal ideation     Other reaction(s): suicidal ideation    suicidal    Suicidal ideation    Levofloxacin Angioedema, Swelling and Rash     eyelid swelling    eyelid swelling. Patient tolerates Avelox    Levofloxacin In D5w Anaphylaxis     Moon face lips swelling just Levaquin tolerated D5W)    Nutritional Supplements Anaphylaxis     Grapes ( red dye    Prochlorperazine Anaphylaxis     HIGH Compazine involuntary muscle spasms low doses tolerated)    Red Dye Anaphylaxis    Becky Anaphylaxis and Swelling     Becky    SOB facial swelling    Rosemary Oil Anaphylaxis, Itching and Swelling     Becky  SOB facial swelling      SOB facial swelling    Strawberry Shortness of breath     White blisters in mouth      Other reaction(s): white blisters in mouth, shortness of breath    Strawberry Flavor Anaphylaxis     Allergy to strawberry fruit and juice  "   Sulfa (Sulfonamide Antibiotics) Anaphylaxis, Rash, Shortness of breath and Swelling     SOB itchy hives    Other Reaction(s): rash, SOB      SOB itchy hives    Topiramate Anaphylaxis, Swelling and Angioedema     eyelid swelling    Throat swelling    eyelid swelling  Throat swelling      Throat swelling      eyelid swelling    Tree Nuts Shortness of breath     walnuts    Tree Pollen-Black [place] Shortness of breath     Other Reaction(s): Other: See Comments      White blisters in mouth      blisters in mouth-carries an EPIPEN-\"I have gotten short of breath\"    Tree Pollen-Pecan Shortness of breath     pecans    [id number] Antimigraine Agents Anaphylaxis and Nausea Only     \"Elfxxrlic-0-ei0- anti migraine agents and DHE: can cause stroke per pt \"    None due to Raynaud's  ( Triptans cause a stroke)    [place] Shortness of breath    Aripiprazole Unknown, Fever and Other     fever and tremors    Fevers and Tremors    Tremor    Aspartame Diarrhea     Blood sugar [place], Diarrhea    Aspartame (Bulk) Diarrhea, Nausea Only and Nausea/vomiting     Other Reaction(s): nausea, diarrhea, abdominal pain      Blood sugar [place], Diarrhea    Fenofibrate Other     Double vision    Gluten Itching, Other and Rash     Rashes constipation gastric discomfort    Hydrochlorothiazide Hives, Itching and Rash     hctz removed as free-text allergy and entered as [facility] allergy,1455 10/6/2007JO      itchy hives    Hydromorphone Unknown, GI intolerance and Nausea Only     Intolerance nausea instant    Iron Hives and Rash     ichy hive ( can tolerate ferrous gluconate)    Meclizine Angioedema, Other and Swelling     eyelid swelling    Swelling of the eyelids    Eyelids and face    Metformin Other     Other reaction(s): Dyspepsia, Dyspepsia    Propoxyphene Unknown and Hives     Darvocet itchy hives    Statins-Hmg-Coa Reductase Inhibitors Unknown     Double vision    Tetracyclines Hives, Itching and Rash     sulfa    Rash itching    Itchy  rash "    Thiazides Hives, Itching and Rash     itchy hives    Venlafaxine Unknown and Fever     Fevers chills    Wheat Unknown     oral blisters    Adhesive Tape-Silicones Itching and Other    Barbiturates Unknown    Dhe Unknown     Raynaud's disease    Diet Foods Diarrhea     Aspartame allergy only. Removes to many foods to interface.    Ferrous Sulfate Hives    Nsaids (Non-Steroidal Anti-Inflammatory Drug) Unknown and Nausea/vomiting    Other Unknown    Sulfonylureas Unknown    Tobramycin Unknown    Vancomycin Unknown and Hives     Niyah syndrome    Other reaction(s): Other    Red man syndrome if given fast, benadryl with.     Niyah syndrome  Other reaction(s): Other      Other reaction(s): Other      Red man syndrome if given fast, benadryl with.    Adhesive Rash    Azithromycin Hives, Itching and Rash    Betamethasone Nausea And Vomiting, Other, GI intolerance and Diarrhea     N/V/D    House Dust Rash    Metoclopramide Hcl Other    Milk Unknown, Itching and Rash     ABD pain    Prednisone Rash    Propoxyphene-Acetaminophen Hives and Rash    Sulfacetamide Sodium Rash and Swelling    Zolpidem Other and Hallucinations     Sleep walk    Other Reaction(s): insomnia and agitation      Sleep walk         Diabetes Pharmacotherapy:    Ozempic 1 mg subcutaneous once weekly  Toujeo 66 units subcutaneous once daily  Humalog with an ICR of 1:4 and ISF of 1:30  Farxiga 10 mg once daily    Patient reports tolerating this regimen well.      Lab Review  Lab Results   Component Value Date    BILITOT 0.3 08/06/2023    CALCIUM 9.5 08/06/2023    CO2 23 08/06/2023     08/06/2023    CREATININE 0.82 08/06/2023    GLUCOSE 135 (H) 08/06/2023    ALKPHOS 81 08/06/2023    K 4.2 08/06/2023    PROT 8.8 (H) 08/06/2023     08/06/2023    AST 21 08/06/2023    ALT 27 08/06/2023    BUN 17 08/06/2023    ANIONGAP 16 08/06/2023    MG 2.16 07/28/2023    PHOS 3.8 04/30/2023    LDH 95 07/17/2020    ALBUMIN 4.3 08/06/2023    LIPASE 20 04/30/2023     GFRF 89 2023    GFRMALE CANCELED 2023     Lab Results   Component Value Date    TRIG CANCELED 2023    CHOL CANCELED 2023    HDL CANCELED 2023     Lab Results   Component Value Date    HGBA1C 5.9 (H) 2023    HGBA1C 7.8 (A) 2023    HGBA1C 8.3 (A) 2022     The 10-year ASCVD risk score (Mamie SANDERSON, et al., 2019) is: 3.4%    Values used to calculate the score:      Age: 48 years      Sex: Female      Is Non- : No      Diabetic: Yes      Tobacco smoker: No      Systolic Blood Pressure: 146 mmHg      Is BP treated: Yes      HDL Cholesterol: 46.3 mg/dL      Total Cholesterol: 186 mg/dL          Assessment/Plan     Plan:  Continue humalog ICR 1:5 plus SSI 2:50 >150, can give correction doses 2 hours after meals if needed  Conrinue ozempic 2 mg subcutaneous once weekly  Farxiga 5 mg once daily  Continue toujeo 38 units subcutaneous once daily      PATIENT EDUCATION/GOALS    Goals  Fasting B - 130 mg/dL  Postprandial BG: less than 180 mg/dL  A1c: less than 7%    Type of encounter: [virtual]    Provided counseling on lifestyle modifications, medications, and self-monitoring. Patient has no additional questions at this time. Pharmacy to follow up in 6 weeks. Please reach out with any questions. Thank you.       Joanna Evans, PharmD    Provider on site: Jazz Mccall CNP  Continue all meds under the continuation of care with the referring provider and clinical pharmacy team.

## 2024-01-16 ENCOUNTER — PHARMACY VISIT (OUTPATIENT)
Dept: PHARMACY | Facility: CLINIC | Age: 49
End: 2024-01-16
Payer: MEDICAID

## 2024-01-18 ENCOUNTER — TELEPHONE (OUTPATIENT)
Dept: ENDOCRINOLOGY | Facility: CLINIC | Age: 49
End: 2024-01-18

## 2024-01-18 ENCOUNTER — PATIENT MESSAGE (OUTPATIENT)
Dept: ENDOCRINOLOGY | Facility: CLINIC | Age: 49
End: 2024-01-18

## 2024-01-18 ENCOUNTER — TELEMEDICINE (OUTPATIENT)
Dept: NEUROLOGY | Facility: CLINIC | Age: 49
End: 2024-01-18
Payer: MEDICAID

## 2024-01-18 DIAGNOSIS — Z79.4 TYPE 2 DIABETES MELLITUS WITH DIABETIC AUTONOMIC NEUROPATHY, WITH LONG-TERM CURRENT USE OF INSULIN (MULTI): Primary | ICD-10-CM

## 2024-01-18 DIAGNOSIS — E11.43 TYPE 2 DIABETES MELLITUS WITH DIABETIC AUTONOMIC NEUROPATHY, WITH LONG-TERM CURRENT USE OF INSULIN (MULTI): Primary | ICD-10-CM

## 2024-01-18 DIAGNOSIS — G43.711 CHRONIC MIGRAINE WITHOUT AURA, INTRACTABLE, WITH STATUS MIGRAINOSUS: ICD-10-CM

## 2024-01-18 PROCEDURE — 99214 OFFICE O/P EST MOD 30 MIN: CPT | Performed by: PSYCHIATRY & NEUROLOGY

## 2024-01-18 RX ORDER — SEMAGLUTIDE 1.34 MG/ML
1 INJECTION, SOLUTION SUBCUTANEOUS
Qty: 3 ML | Refills: 5 | Status: SHIPPED | OUTPATIENT
Start: 2024-01-18 | End: 2024-04-02 | Stop reason: SINTOL

## 2024-01-18 RX ORDER — DIPHENHYDRAMINE HYDROCHLORIDE 50 MG/ML
INJECTION INTRAMUSCULAR; INTRAVENOUS
COMMUNITY
Start: 2024-01-08

## 2024-01-18 RX ORDER — DICLOFENAC SODIUM 50 MG/1
50 TABLET, DELAYED RELEASE ORAL 3 TIMES DAILY PRN
Qty: 60 TABLET | Refills: 3 | Status: SHIPPED | OUTPATIENT
Start: 2024-01-18

## 2024-01-18 RX ORDER — COVID-19 ANTIGEN TEST
KIT MISCELLANEOUS
COMMUNITY
Start: 2024-01-04 | End: 2024-04-04 | Stop reason: ALTCHOICE

## 2024-01-18 ASSESSMENT — PATIENT HEALTH QUESTIONNAIRE - PHQ9
1. LITTLE INTEREST OR PLEASURE IN DOING THINGS: NOT AT ALL
2. FEELING DOWN, DEPRESSED OR HOPELESS: NOT AT ALL
SUM OF ALL RESPONSES TO PHQ9 QUESTIONS 1 AND 2: 0

## 2024-01-18 NOTE — PROGRESS NOTES
Since weather has been variable and increase migraines occur.   Infusion of gamma guard earlier this week ( gets q 2 weeks) Premedicates with ubrelvy.   Did start getting a migraine in middle of infusion and stopped infusion for a time and gave more fluids and started back up. Was able to get through infusion without getting a migraine.     With current weather has increase Ubrelvy use. Has not had to repeat   IN past 3 weeks has used Ubrelvy 6 times and kept migraine from escalating.   Will use diclofenac and promethazine first line 3 times and did not work in past 3 week. Had to take Ubrelvy to follow. Combo was better than ubrelvy alone. 2 times in past 3 weeks more debilitating and needed to be in dark room with ice agnes on neck.     Aimovig once monthly. Wear off a couple of days prior to redose. Denies constipation blood pressure controlled. Also takes qulipta daily at 60 mg  Feels overall migraine regimen is good. Weather just seems to have flared it up.   Follows gluten free - dairy free diet. Has lost some weight. Is on ozempic but on a lower dose due to dysphagia/odynophagia. Has gone from 236 to 219. This will be good for IIH.     Migraines occur right side.   Associated nausea , light and noise sensitivity.   Triggers are weather and IVIG infusion.     Sleeping well.  Averages 8-9 hours with one interruption to use bathroom.     Anxiety and depression has been under control. Has been working with counselor and working on skills. Has had 1 blow up in past 3 week. Irritability.   Had not had another for 4 months prior.   Sees  counselor once weekly virtually.   Sees psychiatry every 3 months.

## 2024-01-18 NOTE — TELEPHONE ENCOUNTER
Patient has appointment with you on 02/26, but would like an appointment ASAP regarding problems with Ozempic. Please call patient at [phone] .  Thank you

## 2024-01-25 ENCOUNTER — APPOINTMENT (OUTPATIENT)
Dept: PHARMACY | Facility: HOSPITAL | Age: 49
End: 2024-01-25
Payer: MEDICAID

## 2024-01-26 ENCOUNTER — TELEMEDICINE CLINICAL SUPPORT (OUTPATIENT)
Dept: ENDOCRINOLOGY | Facility: CLINIC | Age: 49
End: 2024-01-26
Payer: MEDICAID

## 2024-01-26 NOTE — PROGRESS NOTES
Clinical Pharmacy Team met with Jonathan Ayala regarding a consultation for diabetes management thanks to a referral from Dr. Marah Felix MD. Below is a summary of our conversation and recommendations:    ________________________________________________________________________      Allergies   Allergen Reactions    Acetazolamide Hives, Rash, Shortness of breath and Swelling     oral hives, redness, ABD pain    Atorvastatin Unknown     Causes muscle pain    Cefdinir Itching, Rash, Shortness of breath and Anaphylaxis     Itchy throat    Throat closing / difficulty breathing.  Took Benadryl at home.    Itchy throat      Throat closing / difficulty breathing.  Took Benadryl at home.    Ceftriaxone Anaphylaxis, Rash, Shortness of breath and Swelling     SOB    SOB hives turned red during gallbladder    Doxepin Anaphylaxis, Hives, Swelling, Angioedema and Rash     Itchy sore throat problems with swallowing    facial swelling    Itchy sore throat problems with swallowing      facial swelling    Duloxetine Anaphylaxis, Hallucinations and Rash     suicidal ideation    suicidal ideation     Other reaction(s): suicidal ideation    suicidal    Suicidal ideation    Levofloxacin Angioedema, Swelling and Rash     eyelid swelling    eyelid swelling. Patient tolerates Avelox    Levofloxacin In D5w Anaphylaxis     Moon face lips swelling just Levaquin tolerated D5W)    Nutritional Supplements Anaphylaxis     Grapes ( red dye    Prochlorperazine Anaphylaxis     HIGH Compazine involuntary muscle spasms low doses tolerated)    Red Dye Anaphylaxis    Becky Anaphylaxis and Swelling     Becky    SOB facial swelling    Rosemary Oil Anaphylaxis, Itching and Swelling     Becky  SOB facial swelling      SOB facial swelling    Strawberry Shortness of breath     White blisters in mouth      Other reaction(s): white blisters in mouth, shortness of breath    Strawberry Flavor Anaphylaxis     Allergy to strawberry fruit and juice     "Sulfa (Sulfonamide Antibiotics) Anaphylaxis, Rash, Shortness of breath and Swelling     SOB itchy hives    Other Reaction(s): rash, SOB      SOB itchy hives    Topiramate Anaphylaxis, Swelling and Angioedema     eyelid swelling    Throat swelling    eyelid swelling  Throat swelling      Throat swelling      eyelid swelling    Tree Nuts Shortness of breath     walnuts    Tree Pollen-Black [place] Shortness of breath     Other Reaction(s): Other: See Comments      White blisters in mouth      blisters in mouth-carries an EPIPEN-\"I have gotten short of breath\"    Tree Pollen-Pecan Shortness of breath     pecans    [id number]-4-[id number] Antimigraine Agents Anaphylaxis and Nausea Only     \"[id number]-9-vl1- anti migraine agents and DHE: can cause stroke per pt \"    None due to Raynaud's  ( Triptans cause a stroke)    [place] Shortness of breath    Aripiprazole Unknown, Fever and Other     fever and tremors    Fevers and Tremors    Tremor    Aspartame Diarrhea     Blood sugar [place], Diarrhea    Aspartame (Bulk) Diarrhea, Nausea Only and Nausea/vomiting     Other Reaction(s): nausea, diarrhea, abdominal pain      Blood sugar [place], Diarrhea    Fenofibrate Other     Double vision    Gluten Itching, Other and Rash     Rashes constipation gastric discomfort    Hydrochlorothiazide Hives, Itching and Rash     hctz removed as free-text allergy and entered as [facility] allergy,1455 10/6/2007JO      itchy hives    Hydromorphone Unknown, GI intolerance and Nausea Only     Intolerance nausea instant    Iron Hives and Rash     ichy hive ( can tolerate ferrous gluconate)    Meclizine Angioedema, Other and Swelling     eyelid swelling    Swelling of the eyelids    Eyelids and face    Metformin Other     Other reaction(s): Dyspepsia, Dyspepsia    Propoxyphene Unknown and Hives     Darvocet itchy hives    Statins-Hmg-Coa Reductase Inhibitors Unknown     Double vision    Tetracyclines Hives, Itching and Rash     sulfa    Rash itching    Itchy  rash    " Thiazides Hives, Itching and Rash     itchy hives    Venlafaxine Unknown and Fever     Fevers chills    Wheat Unknown     oral blisters    Adhesive Tape-Silicones Itching and Other    Barbiturates Unknown    Dhe Unknown     Raynaud's disease    Diet Foods Diarrhea     Aspartame allergy only. Removes to many foods to interface.    Ferrous Sulfate Hives    Nsaids (Non-Steroidal Anti-Inflammatory Drug) Unknown and Nausea/vomiting    Other Unknown    Sulfonylureas Unknown    Tobramycin Unknown    Vancomycin Unknown and Hives     Niyah syndrome    Other reaction(s): Other    Red man syndrome if given fast, benadryl with.     Niyah syndrome  Other reaction(s): Other      Other reaction(s): Other      Red man syndrome if given fast, benadryl with.    Adhesive Rash    Azithromycin Hives, Itching and Rash    Betamethasone Nausea And Vomiting, Other, GI intolerance and Diarrhea     N/V/D    House Dust Rash    Metoclopramide Hcl Other    Milk Unknown, Itching and Rash     ABD pain    Prednisone Rash    Propoxyphene-Acetaminophen Hives and Rash    Sulfacetamide Sodium Rash and Swelling    Zolpidem Other and Hallucinations     Sleep walk    Other Reaction(s): insomnia and agitation      Sleep walk         Diabetes Pharmacotherapy:    Ozempic 1 mg subcutaneous once weekly  Toujeo 66 units subcutaneous once daily  Humalog with an ICR of 1:4 and ISF of 1:30  Farxiga 10 mg once daily    Patient reports tolerating this regimen well.      Lab Review  Lab Results   Component Value Date    BILITOT 0.3 08/06/2023    CALCIUM 9.5 08/06/2023    CO2 23 08/06/2023     08/06/2023    CREATININE 0.82 08/06/2023    GLUCOSE 135 (H) 08/06/2023    ALKPHOS 81 08/06/2023    K 4.2 08/06/2023    PROT 8.8 (H) 08/06/2023     08/06/2023    AST 21 08/06/2023    ALT 27 08/06/2023    BUN 17 08/06/2023    ANIONGAP 16 08/06/2023    MG 2.16 07/28/2023    PHOS 3.8 04/30/2023    LDH 95 07/17/2020    ALBUMIN 4.3 08/06/2023    LIPASE 20 04/30/2023     GFRF 89 2023    GFRMALE CANCELED 2023     Lab Results   Component Value Date    TRIG CANCELED 2023    CHOL CANCELED 2023    HDL CANCELED 2023     Lab Results   Component Value Date    HGBA1C 5.9 (H) 2023    HGBA1C 7.8 (A) 2023    HGBA1C 8.3 (A) 2022     The 10-year ASCVD risk score (Mamie SANDERSON, et al., 2019) is: 3.4%    Values used to calculate the score:      Age: 48 years      Sex: Female      Is Non- : No      Diabetic: Yes      Tobacco smoker: No      Systolic Blood Pressure: 146 mmHg      Is BP treated: Yes      HDL Cholesterol: 46.3 mg/dL      Total Cholesterol: 186 mg/dL          Assessment/Plan     The patient reports today for a diabetes consultation. Reviewed CGM report and discussed it with the patient. Patient is experiancing more stable glucose readings. Discussed regimen. She is going down back to ozempic 1 mg. Recommend no changes at this tiem  A minimum of 72 hours of CGM data was reviewed and used to make therapy decisions.       PATIENT EDUCATION/GOALS    Goals  Fasting B - 130 mg/dL  Postprandial BG: less than 180 mg/dL  A1c: less than 7%    Type of encounter: [virtual]    Provided counseling on lifestyle modifications, medications, and self-monitoring. Patient has no additional questions at this time. Pharmacy to follow up in 6 weeks. Please reach out with any questions. Thank you.       Loyda FitzpatrickD    Provider on site: Jazz Mccall CNP  Continue all meds under the continuation of care with the referring provider and clinical pharmacy

## 2024-01-29 PROCEDURE — RXMED WILLOW AMBULATORY MEDICATION CHARGE

## 2024-01-30 ENCOUNTER — PHARMACY VISIT (OUTPATIENT)
Dept: PHARMACY | Facility: CLINIC | Age: 49
End: 2024-01-30
Payer: MEDICAID

## 2024-02-01 ENCOUNTER — TELEPHONE (OUTPATIENT)
Dept: ENDOCRINOLOGY | Facility: CLINIC | Age: 49
End: 2024-02-01
Payer: MEDICAID

## 2024-02-01 NOTE — TELEPHONE ENCOUNTER
Paperwork for patient's CGM sent to [facility] as requested . Geovanna Evangelista LPN

## 2024-02-05 ENCOUNTER — SPECIALTY PHARMACY (OUTPATIENT)
Dept: PHARMACY | Facility: CLINIC | Age: 49
End: 2024-02-05

## 2024-02-06 ENCOUNTER — HOSPITAL ENCOUNTER (EMERGENCY)
Facility: HOSPITAL | Age: 49
Discharge: HOME | End: 2024-02-06
Attending: STUDENT IN AN ORGANIZED HEALTH CARE EDUCATION/TRAINING PROGRAM
Payer: MEDICAID

## 2024-02-06 ENCOUNTER — APPOINTMENT (OUTPATIENT)
Dept: RADIOLOGY | Facility: HOSPITAL | Age: 49
End: 2024-02-06
Payer: MEDICAID

## 2024-02-06 ENCOUNTER — APPOINTMENT (OUTPATIENT)
Dept: CARDIOLOGY | Facility: HOSPITAL | Age: 49
End: 2024-02-06
Payer: MEDICAID

## 2024-02-06 VITALS
OXYGEN SATURATION: 98 % | HEART RATE: 86 BPM | DIASTOLIC BLOOD PRESSURE: 93 MMHG | TEMPERATURE: 97.6 F | BODY MASS INDEX: 41.96 KG/M2 | HEIGHT: 62 IN | SYSTOLIC BLOOD PRESSURE: 141 MMHG | WEIGHT: 228 LBS | RESPIRATION RATE: 20 BRPM

## 2024-02-06 DIAGNOSIS — R10.13 ABDOMINAL PAIN, EPIGASTRIC: Primary | ICD-10-CM

## 2024-02-06 LAB
ALBUMIN SERPL BCP-MCNC: 3.8 G/DL (ref 3.4–5)
ALP SERPL-CCNC: 51 U/L (ref 33–110)
ALT SERPL W P-5'-P-CCNC: 21 U/L (ref 7–45)
ANION GAP SERPL CALC-SCNC: 10 MMOL/L (ref 10–20)
AST SERPL W P-5'-P-CCNC: 17 U/L (ref 9–39)
BASOPHILS # BLD AUTO: 0.02 X10*3/UL (ref 0–0.1)
BASOPHILS NFR BLD AUTO: 0.6 %
BILIRUB SERPL-MCNC: 0.3 MG/DL (ref 0–1.2)
BUN SERPL-MCNC: 14 MG/DL (ref 6–23)
CALCIUM SERPL-MCNC: 8.6 MG/DL (ref 8.6–10.3)
CARDIAC TROPONIN I PNL SERPL HS: <3 NG/L (ref 0–13)
CHLORIDE SERPL-SCNC: 105 MMOL/L (ref 98–107)
CO2 SERPL-SCNC: 27 MMOL/L (ref 21–32)
CREAT SERPL-MCNC: 0.67 MG/DL (ref 0.5–1.05)
EGFRCR SERPLBLD CKD-EPI 2021: >90 ML/MIN/1.73M*2
EOSINOPHIL # BLD AUTO: 0.06 X10*3/UL (ref 0–0.7)
EOSINOPHIL NFR BLD AUTO: 1.7 %
ERYTHROCYTE [DISTWIDTH] IN BLOOD BY AUTOMATED COUNT: 14.5 % (ref 11.5–14.5)
FLUAV RNA RESP QL NAA+PROBE: NOT DETECTED
FLUBV RNA RESP QL NAA+PROBE: NOT DETECTED
GLUCOSE SERPL-MCNC: 114 MG/DL (ref 74–99)
HCT VFR BLD AUTO: 40.2 % (ref 36–46)
HGB BLD-MCNC: 13.3 G/DL (ref 12–16)
IMM GRANULOCYTES # BLD AUTO: 0.01 X10*3/UL (ref 0–0.7)
IMM GRANULOCYTES NFR BLD AUTO: 0.3 % (ref 0–0.9)
LACTATE SERPL-SCNC: 0.9 MMOL/L (ref 0.4–2)
LIPASE SERPL-CCNC: 46 U/L (ref 9–82)
LYMPHOCYTES # BLD AUTO: 1.46 X10*3/UL (ref 1.2–4.8)
LYMPHOCYTES NFR BLD AUTO: 41.1 %
MAGNESIUM SERPL-MCNC: 1.77 MG/DL (ref 1.6–2.4)
MCH RBC QN AUTO: 29.6 PG (ref 26–34)
MCHC RBC AUTO-ENTMCNC: 33.1 G/DL (ref 32–36)
MCV RBC AUTO: 90 FL (ref 80–100)
MONOCYTES # BLD AUTO: 0.35 X10*3/UL (ref 0.1–1)
MONOCYTES NFR BLD AUTO: 9.9 %
NEUTROPHILS # BLD AUTO: 1.65 X10*3/UL (ref 1.2–7.7)
NEUTROPHILS NFR BLD AUTO: 46.4 %
NRBC BLD-RTO: 0 /100 WBCS (ref 0–0)
PLATELET # BLD AUTO: 233 X10*3/UL (ref 150–450)
POTASSIUM SERPL-SCNC: 3.6 MMOL/L (ref 3.5–5.3)
PROT SERPL-MCNC: 7.8 G/DL (ref 6.4–8.2)
RBC # BLD AUTO: 4.49 X10*6/UL (ref 4–5.2)
SARS-COV-2 RNA RESP QL NAA+PROBE: NOT DETECTED
SODIUM SERPL-SCNC: 138 MMOL/L (ref 136–145)
WBC # BLD AUTO: 3.6 X10*3/UL (ref 4.4–11.3)

## 2024-02-06 PROCEDURE — 84484 ASSAY OF TROPONIN QUANT: CPT | Performed by: STUDENT IN AN ORGANIZED HEALTH CARE EDUCATION/TRAINING PROGRAM

## 2024-02-06 PROCEDURE — 96361 HYDRATE IV INFUSION ADD-ON: CPT

## 2024-02-06 PROCEDURE — 74177 CT ABD & PELVIS W/CONTRAST: CPT

## 2024-02-06 PROCEDURE — 99284 EMERGENCY DEPT VISIT MOD MDM: CPT | Mod: 25 | Performed by: STUDENT IN AN ORGANIZED HEALTH CARE EDUCATION/TRAINING PROGRAM

## 2024-02-06 PROCEDURE — 85025 COMPLETE CBC W/AUTO DIFF WBC: CPT | Performed by: STUDENT IN AN ORGANIZED HEALTH CARE EDUCATION/TRAINING PROGRAM

## 2024-02-06 PROCEDURE — 87636 SARSCOV2 & INF A&B AMP PRB: CPT | Performed by: STUDENT IN AN ORGANIZED HEALTH CARE EDUCATION/TRAINING PROGRAM

## 2024-02-06 PROCEDURE — 74177 CT ABD & PELVIS W/CONTRAST: CPT | Performed by: RADIOLOGY

## 2024-02-06 PROCEDURE — 36415 COLL VENOUS BLD VENIPUNCTURE: CPT | Performed by: STUDENT IN AN ORGANIZED HEALTH CARE EDUCATION/TRAINING PROGRAM

## 2024-02-06 PROCEDURE — 2550000001 HC RX 255 CONTRASTS: Performed by: STUDENT IN AN ORGANIZED HEALTH CARE EDUCATION/TRAINING PROGRAM

## 2024-02-06 PROCEDURE — 83735 ASSAY OF MAGNESIUM: CPT | Performed by: STUDENT IN AN ORGANIZED HEALTH CARE EDUCATION/TRAINING PROGRAM

## 2024-02-06 PROCEDURE — 2500000004 HC RX 250 GENERAL PHARMACY W/ HCPCS (ALT 636 FOR OP/ED): Performed by: STUDENT IN AN ORGANIZED HEALTH CARE EDUCATION/TRAINING PROGRAM

## 2024-02-06 PROCEDURE — 93005 ELECTROCARDIOGRAM TRACING: CPT

## 2024-02-06 PROCEDURE — 96374 THER/PROPH/DIAG INJ IV PUSH: CPT | Mod: 59

## 2024-02-06 PROCEDURE — 80053 COMPREHEN METABOLIC PANEL: CPT | Performed by: STUDENT IN AN ORGANIZED HEALTH CARE EDUCATION/TRAINING PROGRAM

## 2024-02-06 PROCEDURE — 83605 ASSAY OF LACTIC ACID: CPT | Performed by: STUDENT IN AN ORGANIZED HEALTH CARE EDUCATION/TRAINING PROGRAM

## 2024-02-06 PROCEDURE — 2500000001 HC RX 250 WO HCPCS SELF ADMINISTERED DRUGS (ALT 637 FOR MEDICARE OP): Performed by: STUDENT IN AN ORGANIZED HEALTH CARE EDUCATION/TRAINING PROGRAM

## 2024-02-06 PROCEDURE — 83690 ASSAY OF LIPASE: CPT | Performed by: STUDENT IN AN ORGANIZED HEALTH CARE EDUCATION/TRAINING PROGRAM

## 2024-02-06 RX ORDER — ALUMINUM HYDROXIDE, MAGNESIUM HYDROXIDE, AND SIMETHICONE 1200; 120; 1200 MG/30ML; MG/30ML; MG/30ML
30 SUSPENSION ORAL ONCE
Status: COMPLETED | OUTPATIENT
Start: 2024-02-06 | End: 2024-02-06

## 2024-02-06 RX ORDER — HEPARIN 100 UNIT/ML
SYRINGE INTRAVENOUS
Status: DISCONTINUED
Start: 2024-02-06 | End: 2024-02-06 | Stop reason: HOSPADM

## 2024-02-06 RX ORDER — ONDANSETRON HYDROCHLORIDE 2 MG/ML
4 INJECTION, SOLUTION INTRAVENOUS ONCE
Status: COMPLETED | OUTPATIENT
Start: 2024-02-06 | End: 2024-02-06

## 2024-02-06 RX ADMIN — ALUMINUM HYDROXIDE, MAGNESIUM HYDROXIDE, AND DIMETHICONE 30 ML: 200; 20; 200 SUSPENSION ORAL at 20:43

## 2024-02-06 RX ADMIN — IOHEXOL 75 ML: 350 INJECTION, SOLUTION INTRAVENOUS at 19:11

## 2024-02-06 RX ADMIN — ONDANSETRON 4 MG: 2 INJECTION INTRAMUSCULAR; INTRAVENOUS at 18:04

## 2024-02-06 RX ADMIN — SODIUM CHLORIDE, POTASSIUM CHLORIDE, SODIUM LACTATE AND CALCIUM CHLORIDE 1000 ML: 600; 310; 30; 20 INJECTION, SOLUTION INTRAVENOUS at 18:01

## 2024-02-06 ASSESSMENT — COLUMBIA-SUICIDE SEVERITY RATING SCALE - C-SSRS
1. IN THE PAST MONTH, HAVE YOU WISHED YOU WERE DEAD OR WISHED YOU COULD GO TO SLEEP AND NOT WAKE UP?: NO
6. HAVE YOU EVER DONE ANYTHING, STARTED TO DO ANYTHING, OR PREPARED TO DO ANYTHING TO END YOUR LIFE?: NO
2. HAVE YOU ACTUALLY HAD ANY THOUGHTS OF KILLING YOURSELF?: NO

## 2024-02-06 ASSESSMENT — PAIN - FUNCTIONAL ASSESSMENT: PAIN_FUNCTIONAL_ASSESSMENT: 0-10

## 2024-02-06 ASSESSMENT — PAIN SCALES - GENERAL: PAINLEVEL_OUTOF10: 7

## 2024-02-07 PROCEDURE — RXMED WILLOW AMBULATORY MEDICATION CHARGE

## 2024-02-07 NOTE — ED PROVIDER NOTES
HPI   Chief Complaint   Patient presents with    Abdominal Pain     Started this morning, abd pain radiating to back with tearing sensation, denies CP and SOB       This is a 48-year-old female past medical history of CVI D, diabetes, hypothyroidism, adrenal insufficiency, gastroparesis who has had her appendix and gallbladder removed presents emergency department for pain in her epigastric region.  She does have a history of chronic pancreatitis as well.  Patient states that symptoms started this morning.  She denies any shortness of breath or chest pain.  She reports nausea but no vomiting.  She denies any diarrhea or constipation.  Last bowel movement was yesterday.                          [place] Coma Scale Score: 15                  Patient History   Past Medical History:   Diagnosis Date    Abnormal findings on diagnostic imaging of other abdominal regions, including retroperitoneum 10/14/2020    Abnormal CT of the abdomen    Acquired deformity of nose 03/24/2022    Nasal deformity    Acute upper respiratory infection, unspecified 10/16/2019    Acute URI    Allergy status to unspecified drugs, medicaments and biological substances 05/22/2020    History of drug allergy    Allergy status to unspecified drugs, medicaments and biological substances 11/13/2020    History of adverse drug reaction    Benign intracranial hypertension 01/27/2022    Pseudotumor cerebri    Bipolar disorder, unspecified (CMS/[facility])     Bipolar disease, chronic    Breast calcification, right 08/21/2018    Cellulitis of abdominal wall 09/28/2022    Cellulitis of right abdominal wall    Cervicalgia 07/01/2020    Cervicalgia of [id number] region    Chronic maxillary sinusitis 01/04/2022    Chronic maxillary sinusitis    Chronic sialoadenitis 03/16/2020    Chronic sialoadenitis    COVID-19 01/06/2022    COVID-19 with multiple comorbidities    Decreased white blood cell count, unspecified 11/04/2019    Leukopenia    Disturbances of  salivary secretion 03/16/2020    Xerostomia    Dry eye syndrome of bilateral lacrimal glands 10/07/2022    Dry eyes, bilateral    Encounter for preprocedural cardiovascular examination 02/01/2022    Preoperative cardiovascular examination    Food additives allergy status 06/11/2020    Allergy to food dye    Fracture of nasal bones, initial encounter for closed fracture 03/03/2022    Closed fracture of nasal bone, initial encounter    Granuloma of right orbit 10/07/2021    Inflammatory pseudotumor of right orbit    History of endometrial ablation 11/09/2017    Localized swelling, mass and lump, head 03/24/2022    Swollen nose    Major depressive disorder, recurrent, in full remission (CMS/HCC) 10/07/2021    Depression, major, recurrent, in complete remission    Mammary duct ectasia of left breast 08/24/2022    Periductal mastitis of left breast    Nipple discharge 08/24/2022    Bloody discharge from left nipple    Ocular pain, right eye 10/07/2022    Pain in right eye    Other abnormal and inconclusive findings on diagnostic imaging of breast 07/06/2020    Other abnormal and inconclusive findings on diagnostic imaging of breast    Other anomalies of pupillary function 05/31/2019    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    Other chest pain 05/18/2020    Chest discomfort    Other conditions influencing health status 08/01/2022    History of cough    Other conditions influencing health status 08/03/2021    Chronic migraine    Other specified disorders of eustachian tube, left ear 11/18/2019    ETD (Eustachian tube dysfunction), left    Other specified disorders of nose and nasal sinuses 03/24/2022    Nasal dryness    Other specified disorders of nose and nasal sinuses 03/24/2022    Nasal crusting    Pelvic and perineal pain 07/06/2020    Pelvic pain    Personal history of other diseases of the circulatory system 04/07/2020    History of sinus tachycardia    Personal history of other diseases of the circulatory  system 04/07/2020    History of abnormal electrocardiography    Personal history of other diseases of the circulatory system     History of Raynaud's syndrome    Personal history of other diseases of the digestive system     History of irritable bowel syndrome    Personal history of other diseases of the digestive system 03/02/2020    History of oral pain    Personal history of other diseases of the musculoskeletal system and connective tissue 01/19/2022    History of neck pain    Personal history of other diseases of the musculoskeletal system and connective tissue 03/02/2021    History of scleroderma    Personal history of other diseases of the musculoskeletal system and connective tissue 06/16/2020    History of muscle weakness    Personal history of other diseases of the nervous system and sense organs 11/18/2019    History of hearing loss    Personal history of other diseases of the nervous system and sense organs 09/21/2022    History of partial seizures    Personal history of other diseases of the respiratory system 04/14/2021    History of asthma    Personal history of other endocrine, nutritional and metabolic disease 02/17/2021    History of diabetes mellitus    Personal history of other mental and behavioral disorders 05/27/2021    History of anxiety    Personal history of other specified conditions 09/07/2022    History of nipple discharge    Personal history of other specified conditions 10/16/2019    History of headache    Personal history of other specified conditions 09/28/2022    History of lump of left breast    Personal history of other specified conditions 09/16/2021    History of persistent cough    Personal history of other specified conditions 02/01/2022    History of palpitations    Personal history of other specified conditions 03/09/2022    History of headache    Personal history of other specified conditions 02/12/2014    History of chest pain    Personal history of other specified  conditions 10/27/2021    History of nausea and vomiting    Personal history of other specified conditions 10/16/2019    History of fatigue    Personal history of other specified conditions 02/26/2021    History of orthopnea    Personal history of other specified conditions 02/22/2021    History of shortness of breath    Personal history of urinary calculi     H/O renal calculi    Postural orthostatic tachycardia syndrome (POTS)     POTS (postural orthostatic tachycardia syndrome)    Rash and other nonspecific skin eruption 03/15/2022    Rash    Repeated falls 06/23/2021    Recurrent falls    Right lower quadrant pain 10/14/2020    Abdominal pain, RLQ (right lower quadrant)    Sjogren syndrome, unspecified (CMS/[facility]ly [facility])     History of Sjogren's disease    Slow transit constipation 07/09/2020    Slow transit constipation    Subarachnoid hemorrhage, traumatic (CMS/[facility]) 04/19/2023    Traumatic subarachnoid hemorrhage with loss of consciousness of unspecified duration, subsequent encounter 03/15/2022    Subarachnoid hemorrhage following injury, with loss of consciousness, subsequent encounter    Traumatic subarachnoid hemorrhage without loss of consciousness, subsequent encounter     Subarachnoid hemorrhage following injury, no loss of consciousness, subsequent encounter    Unspecified disorder of refraction 10/07/2022    Refractive error    Unspecified optic neuritis 11/06/2020    Right optic neuritis    Unspecified optic neuritis 11/06/2020    Optic neuritis, right    Unspecified visual loss 09/25/2019    Vision loss    Venous insufficiency (chronic) (peripheral) 10/18/2021    Chronic venous insufficiency of lower extremity    Viral infection, unspecified 01/11/2022    Nonspecific syndrome suggestive of viral illness    Vitamin D deficiency, unspecified 09/28/2022    Vitamin D deficiency     Past Surgical History:   Procedure Laterality Date    MR HEAD ANGIO WO IV CONTRAST  2/8/2021    MR HEAD ANGIO WO IV CONTRAST 2/8/2021  [facility]    MR NECK ANGIO WO IV CONTRAST  2/8/2021    MR NECK ANGIO WO IV CONTRAST 2/8/2021 [facility]ICAL [facility]    OTHER SURGICAL HISTORY  08/22/2019    Carpal tunnel surgery    OTHER SURGICAL HISTORY  08/22/2019    Hysterectomy    OTHER SURGICAL HISTORY  08/22/2019    Venous access port placement    OTHER SURGICAL HISTORY  08/22/2019    Cholecystectomy    OTHER SURGICAL HISTORY  08/22/2019    Appendectomy    OTHER SURGICAL HISTORY  08/22/2019    Pyloroplasty     Family History   Problem Relation Name Age of Onset    Other (Perforated bowel) Mother      Hyperlipidemia Father      Hypertension Father      Heart attack Father      Other (S/P CABG) Father      Stroke Sister      Breast cancer Sister      Lupus Sister      Hyperlipidemia Brother      Hypertension Brother      Blindness Other Multiple     Melanoma Other      Asthma Other      Other (hay fever) Other      Allergies Other       Social History     Tobacco Use    Smoking status: Never    Smokeless tobacco: Never   Vaping Use    Vaping Use: Never used   Substance Use Topics    Alcohol use: Not Currently    Drug use: Yes     Types: Benzodiazepines       Physical Exam   ED Triage Vitals [02/06/24 1601]   Temperature Heart Rate Respirations BP   36.4 °C (97.6 °F) 86 20 139/82      Pulse Ox Temp Source Heart Rate Source Patient Position   98 % Temporal Monitor --      BP Location FiO2 (%)     Right arm --       Physical Exam  Constitutional:       General: She is not in acute distress.  HENT:      Head: Normocephalic and atraumatic.      Right Ear: Tympanic membrane normal.      Left Ear: Tympanic membrane normal.      Mouth/Throat:      Mouth: Mucous membranes are moist.   Eyes:      Extraocular Movements: Extraocular movements intact.      Conjunctiva/sclera: Conjunctivae normal.      Pupils: Pupils are equal, round, and reactive to light.   Cardiovascular:      Rate and Rhythm: Normal rate and regular rhythm.      Heart sounds: No murmur  heard.  Pulmonary:      Effort: Pulmonary effort is normal. No respiratory distress.      Breath sounds: Normal breath sounds. No stridor. No wheezing or rales.   Abdominal:      General: Bowel sounds are normal. There is no distension.      Tenderness: There is abdominal tenderness in the epigastric area. There is no guarding or rebound.   Musculoskeletal:         General: No swelling, tenderness or deformity. Normal range of motion.   Skin:     General: Skin is warm and dry.      Coloration: Skin is not jaundiced.      Findings: No bruising or lesion.   Neurological:      General: No focal deficit present.      Mental Status: She is alert and oriented to person, place, and time. Mental status is at baseline.      Cranial Nerves: No cranial nerve deficit.      Motor: No weakness.   Psychiatric:         Mood and Affect: Mood normal.       Labs Reviewed   CBC WITH AUTO DIFFERENTIAL - Abnormal       Result Value    WBC 3.6 (*)     nRBC 0.0      RBC 4.49      Hemoglobin 13.3      Hematocrit 40.2      MCV 90      MCH 29.6      MCHC 33.1      RDW 14.5      Platelets 233      Neutrophils % 46.4      Immature Granulocytes %, Automated 0.3      Lymphocytes % 41.1      Monocytes % 9.9      Eosinophils % 1.7      Basophils % 0.6      Neutrophils Absolute 1.65      Immature Granulocytes Absolute, Automated 0.01      Lymphocytes Absolute 1.46      Monocytes Absolute 0.35      Eosinophils Absolute 0.06      Basophils Absolute 0.02     COMPREHENSIVE METABOLIC PANEL - Abnormal    Glucose 114 (*)     Sodium 138      Potassium 3.6      Chloride 105      Bicarbonate 27      Anion Gap 10      Urea Nitrogen 14      Creatinine 0.67      eGFR >90      Calcium 8.6      Albumin 3.8      Alkaline Phosphatase 51      Total Protein 7.8      AST 17      Bilirubin, Total 0.3      ALT 21     MAGNESIUM - Normal    Magnesium 1.77     LIPASE - Normal    Lipase 46      Narrative:     Venipuncture immediately after or during the administration of  Metamizole may lead to falsely low results. Testing should be performed immediately prior to Metamizole dosing.   LACTATE - Normal    Lactate 0.9      Narrative:     Venipuncture immediately after or during the administration of Metamizole may lead to falsely low results. Testing should be performed immediately  prior to Metamizole dosing.   INFLUENZA A AND B PCR - Normal    Flu A Result Not Detected      Flu B Result Not Detected      Narrative:     This assay is an in vitro diagnostic multiplex nucleic acid amplification test for the detection and discrimination of Influenza A & B from nasopharyngeal specimens, and has been validated for use at [facility]. Negative results do not preclude Influenza A/B infections, and should not be used as the sole basis for diagnosis, treatment, or other management decisions. If Influenza A/B and RSV PCR results are negative, testing for Parainfluenza virus, Adenovirus and Metapneumovirus is routinely performed for [facility] pediatric oncology and intensive care inpatients, and is available on other patients by placing an add-on request.   SARS-COV-2 PCR - Normal    Coronavirus 2019, PCR Not Detected      Narrative:     This assay has received FDA Emergency Use Authorization (EUA) and is only authorized for the duration of time that circumstances exist to justify the authorization of the emergency use of in vitro diagnostic tests for the detection of SARS-CoV-2 virus and/or diagnosis of COVID-19 infection under section 564(b)(1) of the Act, 21 U.S.C. 360bbb-3(b)(1). This assay is an in vitro diagnostic nucleic acid amplification test for the qualitative detection of SARS-CoV-2 from nasopharyngeal specimens and has been validated for use at [facility]. Negative results do not preclude COVID-19 infections and should not be used as the sole basis for diagnosis, treatment, or other management decisions.     TROPONIN I, HIGH SENSITIVITY -  Normal    Troponin I, High Sensitivity <3      Narrative:     Less than 99th percentile of normal range cutoff-  Female and children under 18 years old <14 ng/L; Male <21 ng/L: Negative  Repeat testing should be performed if clinically indicated.     Female and children under 18 years old 14-50 ng/L; Male 21-50 ng/L:  Consistent with possible cardiac damage and possible increased clinical   risk. Serial measurements may help to assess extent of myocardial damage.     >50 ng/L: Consistent with cardiac damage, increased clinical risk and  myocardial infarction. Serial measurements may help assess extent of   myocardial damage.      NOTE: Children less than 1 year old may have higher baseline troponin   levels and results should be interpreted in conjunction with the overall   clinical context.     NOTE: Troponin I testing is performed using a different   testing methodology at [facility] than at other   [facility] hospitals. Direct result comparisons should only   be made within the same method.     CT abdomen pelvis w IV contrast   Final Result   1. No acute abnormality within the abdomen or pelvis.   2. 3 cm left adnexal cyst, probably physiologic.        MACRO:   None        Signed by: Ernie Fiore 2/6/2024 7:31 PM   Dictation workstation:   [id number]          ED Course & [facility]   ED Course as of 02/07/24 0111   Tue Feb 06, 2024 1949 IMPRESSION:  1. No acute abnormality within the abdomen or pelvis.  2. 3 cm left adnexal cyst, probably physiologic.   [CF]      ED Course User Index  [CF] Rosa M Hale MD         Diagnoses as of 02/07/24 0111   Abdominal pain, epigastric       Medical Decision Making  48-year-old female past medical history as stated above presents emerged department for epigastric pain and nausea.  Patient's vitals show she is hemodynamically stable.  Physical exam reveals tenderness palpation her gastric region.  She does have some mild tenderness in her right lower quadrant  which patient states is normal and has not worsened.  She has had an appendectomy and cholecystectomy.  Patient was given Zofran and fluids.  Patient has no leukocytosis or left shift.  Patient actually has leukopenia which could be secondary to possible viral infection.  No signs of neutropenia with her absolute count.  Patient does not have urinary symptoms I talked indefinitely about obtaining urine but she does not think it is required at this time.  Patient does not have her uterus and her pain is in her epigastric region is Evalose patient for any  pathology.  CT of patient's abdomen showed no acute abnormality.  Her lipase was negative.  Patient may be having gastroparesis but she does feel safe to go home at this time.  She was obtained return precautions.  Patient can be having gastroparesis versus chronic pancreatitis versus gastroenteritis.  Patient states she has medications at home for these issues.  She will follow-up with her primary care provider and she verbalizes any return precautions.    Sinus rhythm at a rate of 96 bpm no ST changes or elevation nonischemic EKG, normal intervals.        Procedure  Procedures     Rosa M Hale MD  02/07/24 0114       Rosa M Hale MD  03/06/24 1200

## 2024-02-09 ENCOUNTER — SPECIALTY PHARMACY (OUTPATIENT)
Dept: PHARMACY | Facility: CLINIC | Age: 49
End: 2024-02-09

## 2024-02-09 ENCOUNTER — PHARMACY VISIT (OUTPATIENT)
Dept: PHARMACY | Facility: CLINIC | Age: 49
End: 2024-02-09
Payer: MEDICAID

## 2024-02-09 LAB
ATRIAL RATE: 96 BPM
P AXIS: 39 DEGREES
PR INTERVAL: 170 MS
Q ONSET: 253 MS
QRS COUNT: 16 BEATS
QRS DURATION: 92 MS
QT INTERVAL: 386 MS
QTC CALCULATION(BAZETT): 488 MS
QTC FREDERICIA: 451 MS
R AXIS: -13 DEGREES
T AXIS: 46 DEGREES
T OFFSET: 446 MS
VENTRICULAR RATE: 96 BPM

## 2024-02-09 PROCEDURE — RXMED WILLOW AMBULATORY MEDICATION CHARGE

## 2024-02-10 ENCOUNTER — PHARMACY VISIT (OUTPATIENT)
Dept: PHARMACY | Facility: CLINIC | Age: 49
End: 2024-02-10
Payer: MEDICAID

## 2024-02-15 ENCOUNTER — PATIENT MESSAGE (OUTPATIENT)
Dept: PRIMARY CARE | Facility: CLINIC | Age: 49
End: 2024-02-15
Payer: MEDICAID

## 2024-02-15 DIAGNOSIS — G44.209 TENSION HEADACHE: Primary | ICD-10-CM

## 2024-02-16 RX ORDER — TIZANIDINE HYDROCHLORIDE 2 MG/1
2 CAPSULE, GELATIN COATED ORAL 3 TIMES DAILY
Qty: 180 CAPSULE | Refills: 1 | OUTPATIENT
Start: 2024-02-16

## 2024-02-16 RX ORDER — TIZANIDINE 2 MG/1
2 TABLET ORAL EVERY 8 HOURS PRN
Qty: 90 TABLET | Refills: 11 | Status: SHIPPED | OUTPATIENT
Start: 2024-02-16 | End: 2024-03-17

## 2024-02-22 ENCOUNTER — APPOINTMENT (OUTPATIENT)
Dept: RADIOLOGY | Facility: HOSPITAL | Age: 49
End: 2024-02-22
Payer: MEDICAID

## 2024-02-22 ENCOUNTER — TELEPHONE (OUTPATIENT)
Dept: PRIMARY CARE | Facility: CLINIC | Age: 49
End: 2024-02-22

## 2024-02-22 ENCOUNTER — HOSPITAL ENCOUNTER (EMERGENCY)
Facility: HOSPITAL | Age: 49
Discharge: HOME | End: 2024-02-22
Attending: EMERGENCY MEDICINE
Payer: MEDICAID

## 2024-02-22 VITALS
TEMPERATURE: 97.6 F | BODY MASS INDEX: 42.14 KG/M2 | HEART RATE: 97 BPM | HEIGHT: 62 IN | WEIGHT: 229 LBS | OXYGEN SATURATION: 97 % | SYSTOLIC BLOOD PRESSURE: 143 MMHG | DIASTOLIC BLOOD PRESSURE: 88 MMHG | RESPIRATION RATE: 18 BRPM

## 2024-02-22 DIAGNOSIS — M54.6 ACUTE RIGHT-SIDED THORACIC BACK PAIN: Primary | ICD-10-CM

## 2024-02-22 LAB
ALBUMIN SERPL BCP-MCNC: 3.8 G/DL (ref 3.4–5)
ALP SERPL-CCNC: 58 U/L (ref 33–110)
ALT SERPL W P-5'-P-CCNC: 34 U/L (ref 7–45)
ANION GAP SERPL CALC-SCNC: 12 MMOL/L (ref 10–20)
APPEARANCE UR: ABNORMAL
AST SERPL W P-5'-P-CCNC: 21 U/L (ref 9–39)
BASOPHILS # BLD AUTO: 0.01 X10*3/UL (ref 0–0.1)
BASOPHILS NFR BLD AUTO: 0.1 %
BILIRUB SERPL-MCNC: 0.3 MG/DL (ref 0–1.2)
BILIRUB UR STRIP.AUTO-MCNC: NEGATIVE MG/DL
BUN SERPL-MCNC: 24 MG/DL (ref 6–23)
CALCIUM SERPL-MCNC: 9 MG/DL (ref 8.6–10.3)
CHLORIDE SERPL-SCNC: 103 MMOL/L (ref 98–107)
CO2 SERPL-SCNC: 28 MMOL/L (ref 21–32)
COLOR UR: YELLOW
CREAT SERPL-MCNC: 0.66 MG/DL (ref 0.5–1.05)
EGFRCR SERPLBLD CKD-EPI 2021: >90 ML/MIN/1.73M*2
EOSINOPHIL # BLD AUTO: 0.01 X10*3/UL (ref 0–0.7)
EOSINOPHIL NFR BLD AUTO: 0.1 %
ERYTHROCYTE [DISTWIDTH] IN BLOOD BY AUTOMATED COUNT: 13.9 % (ref 11.5–14.5)
GLUCOSE SERPL-MCNC: 162 MG/DL (ref 74–99)
GLUCOSE UR STRIP.AUTO-MCNC: NEGATIVE MG/DL
HCG UR QL IA.RAPID: NEGATIVE
HCT VFR BLD AUTO: 41.9 % (ref 36–46)
HGB BLD-MCNC: 13.9 G/DL (ref 12–16)
HOLD SPECIMEN: NORMAL
IMM GRANULOCYTES # BLD AUTO: 0.06 X10*3/UL (ref 0–0.7)
IMM GRANULOCYTES NFR BLD AUTO: 0.9 % (ref 0–0.9)
KETONES UR STRIP.AUTO-MCNC: ABNORMAL MG/DL
LEUKOCYTE ESTERASE UR QL STRIP.AUTO: NEGATIVE
LIPASE SERPL-CCNC: 83 U/L (ref 9–82)
LYMPHOCYTES # BLD AUTO: 1.81 X10*3/UL (ref 1.2–4.8)
LYMPHOCYTES NFR BLD AUTO: 25.8 %
MCH RBC QN AUTO: 29.6 PG (ref 26–34)
MCHC RBC AUTO-ENTMCNC: 33.2 G/DL (ref 32–36)
MCV RBC AUTO: 89 FL (ref 80–100)
MONOCYTES # BLD AUTO: 0.43 X10*3/UL (ref 0.1–1)
MONOCYTES NFR BLD AUTO: 6.1 %
NEUTROPHILS # BLD AUTO: 4.69 X10*3/UL (ref 1.2–7.7)
NEUTROPHILS NFR BLD AUTO: 67 %
NITRITE UR QL STRIP.AUTO: NEGATIVE
NRBC BLD-RTO: 0 /100 WBCS (ref 0–0)
PH UR STRIP.AUTO: 5 [PH]
PLATELET # BLD AUTO: 248 X10*3/UL (ref 150–450)
POTASSIUM SERPL-SCNC: 3.7 MMOL/L (ref 3.5–5.3)
PROT SERPL-MCNC: 7.8 G/DL (ref 6.4–8.2)
PROT UR STRIP.AUTO-MCNC: NEGATIVE MG/DL
RBC # BLD AUTO: 4.69 X10*6/UL (ref 4–5.2)
RBC # UR STRIP.AUTO: NEGATIVE /UL
SODIUM SERPL-SCNC: 139 MMOL/L (ref 136–145)
SP GR UR STRIP.AUTO: 1.03
UROBILINOGEN UR STRIP.AUTO-MCNC: 2 MG/DL
WBC # BLD AUTO: 7 X10*3/UL (ref 4.4–11.3)

## 2024-02-22 PROCEDURE — 74176 CT ABD & PELVIS W/O CONTRAST: CPT

## 2024-02-22 PROCEDURE — 83690 ASSAY OF LIPASE: CPT | Performed by: NURSE PRACTITIONER

## 2024-02-22 PROCEDURE — 96375 TX/PRO/DX INJ NEW DRUG ADDON: CPT

## 2024-02-22 PROCEDURE — 81025 URINE PREGNANCY TEST: CPT | Performed by: NURSE PRACTITIONER

## 2024-02-22 PROCEDURE — 96361 HYDRATE IV INFUSION ADD-ON: CPT

## 2024-02-22 PROCEDURE — 85025 COMPLETE CBC W/AUTO DIFF WBC: CPT | Performed by: NURSE PRACTITIONER

## 2024-02-22 PROCEDURE — 96374 THER/PROPH/DIAG INJ IV PUSH: CPT

## 2024-02-22 PROCEDURE — 99284 EMERGENCY DEPT VISIT MOD MDM: CPT | Mod: 25

## 2024-02-22 PROCEDURE — 74176 CT ABD & PELVIS W/O CONTRAST: CPT | Mod: FOREIGN READ | Performed by: RADIOLOGY

## 2024-02-22 PROCEDURE — 80053 COMPREHEN METABOLIC PANEL: CPT | Performed by: NURSE PRACTITIONER

## 2024-02-22 PROCEDURE — 2500000004 HC RX 250 GENERAL PHARMACY W/ HCPCS (ALT 636 FOR OP/ED): Performed by: NURSE PRACTITIONER

## 2024-02-22 PROCEDURE — 2500000004 HC RX 250 GENERAL PHARMACY W/ HCPCS (ALT 636 FOR OP/ED)

## 2024-02-22 PROCEDURE — 71046 X-RAY EXAM CHEST 2 VIEWS: CPT | Mod: FOREIGN READ | Performed by: RADIOLOGY

## 2024-02-22 PROCEDURE — 71046 X-RAY EXAM CHEST 2 VIEWS: CPT

## 2024-02-22 PROCEDURE — 81003 URINALYSIS AUTO W/O SCOPE: CPT | Performed by: NURSE PRACTITIONER

## 2024-02-22 RX ORDER — HEPARIN 100 UNIT/ML
SYRINGE INTRAVENOUS
Status: COMPLETED
Start: 2024-02-22 | End: 2024-02-22

## 2024-02-22 RX ORDER — ONDANSETRON HYDROCHLORIDE 2 MG/ML
4 INJECTION, SOLUTION INTRAVENOUS ONCE
Status: COMPLETED | OUTPATIENT
Start: 2024-02-22 | End: 2024-02-22

## 2024-02-22 RX ORDER — MORPHINE SULFATE 4 MG/ML
4 INJECTION, SOLUTION INTRAMUSCULAR; INTRAVENOUS ONCE
Status: COMPLETED | OUTPATIENT
Start: 2024-02-22 | End: 2024-02-22

## 2024-02-22 RX ORDER — KETOROLAC TROMETHAMINE 30 MG/ML
15 INJECTION, SOLUTION INTRAMUSCULAR; INTRAVENOUS ONCE
Status: COMPLETED | OUTPATIENT
Start: 2024-02-22 | End: 2024-02-22

## 2024-02-22 RX ORDER — DIPHENHYDRAMINE HYDROCHLORIDE 50 MG/ML
25 INJECTION INTRAMUSCULAR; INTRAVENOUS ONCE
Status: COMPLETED | OUTPATIENT
Start: 2024-02-22 | End: 2024-02-22

## 2024-02-22 RX ADMIN — DIPHENHYDRAMINE HYDROCHLORIDE 25 MG: 50 INJECTION, SOLUTION INTRAMUSCULAR; INTRAVENOUS at 11:13

## 2024-02-22 RX ADMIN — ONDANSETRON 4 MG: 2 INJECTION INTRAMUSCULAR; INTRAVENOUS at 08:23

## 2024-02-22 RX ADMIN — Medication 500 UNITS: at 12:38

## 2024-02-22 RX ADMIN — KETOROLAC TROMETHAMINE 15 MG: 30 INJECTION, SOLUTION INTRAMUSCULAR at 08:24

## 2024-02-22 RX ADMIN — SODIUM CHLORIDE 1000 ML: 9 INJECTION, SOLUTION INTRAVENOUS at 08:24

## 2024-02-22 RX ADMIN — MORPHINE SULFATE 4 MG: 4 INJECTION, SOLUTION INTRAMUSCULAR; INTRAVENOUS at 11:12

## 2024-02-22 ASSESSMENT — PAIN SCALES - GENERAL
PAINLEVEL_OUTOF10: 3
PAINLEVEL_OUTOF10: 8

## 2024-02-22 ASSESSMENT — PAIN - FUNCTIONAL ASSESSMENT
PAIN_FUNCTIONAL_ASSESSMENT: 0-10
PAIN_FUNCTIONAL_ASSESSMENT: 0-10

## 2024-02-22 ASSESSMENT — PAIN DESCRIPTION - ORIENTATION: ORIENTATION: RIGHT

## 2024-02-22 ASSESSMENT — COLUMBIA-SUICIDE SEVERITY RATING SCALE - C-SSRS
2. HAVE YOU ACTUALLY HAD ANY THOUGHTS OF KILLING YOURSELF?: NO
1. IN THE PAST MONTH, HAVE YOU WISHED YOU WERE DEAD OR WISHED YOU COULD GO TO SLEEP AND NOT WAKE UP?: NO
6. HAVE YOU EVER DONE ANYTHING, STARTED TO DO ANYTHING, OR PREPARED TO DO ANYTHING TO END YOUR LIFE?: NO

## 2024-02-22 ASSESSMENT — PAIN DESCRIPTION - DESCRIPTORS: DESCRIPTORS: SHARP

## 2024-02-22 ASSESSMENT — PAIN DESCRIPTION - FREQUENCY: FREQUENCY: INTERMITTENT

## 2024-02-22 ASSESSMENT — PAIN DESCRIPTION - LOCATION: LOCATION: BACK

## 2024-02-22 ASSESSMENT — PAIN DESCRIPTION - PAIN TYPE: TYPE: ACUTE PAIN

## 2024-02-22 ASSESSMENT — PAIN DESCRIPTION - ONSET: ONSET: AWAKENED FROM SLEEP

## 2024-02-22 NOTE — ED TRIAGE NOTES
Pt here with c/o sudden onset right upper side back pain. No chest or abd pain. Slight nausea. Pt took zofran at home and it helped. Pt describes pain as intermittent and sharp.

## 2024-02-22 NOTE — ED PROVIDER NOTES
Chief Complaint   Patient presents with   • Back Pain       HPI       48 year old female presents to the Emergency Department today complaining of right flank/upper back pain that she describes as pressure/squeezing in nature, intermittent since this am, and varies in intensity. Notes to having nausea associated with such. Denies any injury/trauma to the area.       History provided by:  Patient             Patient History   Past Medical History:   Diagnosis Date   • Abnormal findings on diagnostic imaging of other abdominal regions, including retroperitoneum 10/14/2020    Abnormal CT of the abdomen   • Acquired deformity of nose 03/24/2022    Nasal deformity   • Acute upper respiratory infection, unspecified 10/16/2019    Acute URI   • Allergy status to unspecified drugs, medicaments and biological substances 05/22/2020    History of drug allergy   • Allergy status to unspecified drugs, medicaments and biological substances 11/13/2020    History of adverse drug reaction   • Benign intracranial hypertension 01/27/2022    Pseudotumor cerebri   • Bipolar disorder, unspecified (CMS/[facility])     Bipolar disease, chronic   • Breast calcification, right 08/21/2018   • Cellulitis of abdominal wall 09/28/2022    Cellulitis of right abdominal wall   • Cervicalgia 07/01/2020    Cervicalgia of gnxomlan-iklgqzh-njvnb region   • Chronic maxillary sinusitis 01/04/2022    Chronic maxillary sinusitis   • Chronic sialoadenitis 03/16/2020    Chronic sialoadenitis   • COVID-19 01/06/2022    COVID-19 with multiple comorbidities   • Decreased white blood cell count, unspecified 11/04/2019    Leukopenia   • Disturbances of salivary secretion 03/16/2020    Xerostomia   • Dry eye syndrome of bilateral lacrimal glands 10/07/2022    Dry eyes, bilateral   • Encounter for preprocedural cardiovascular examination 02/01/2022    Preoperative cardiovascular examination   • Food additives allergy status 06/11/2020    Allergy to food dye   • Fracture of  nasal bones, initial encounter for closed fracture 03/03/2022    Closed fracture of nasal bone, initial encounter   • Granuloma of right orbit 10/07/2021    Inflammatory pseudotumor of right orbit   • History of endometrial ablation 11/09/2017   • Localized swelling, mass and lump, head 03/24/2022    Swollen nose   • Major depressive disorder, recurrent, in full remission (CMS/HCC) 10/07/2021    Depression, major, recurrent, in complete remission   • Mammary duct ectasia of left breast 08/24/2022    Periductal mastitis of left breast   • Nipple discharge 08/24/2022    Bloody discharge from left nipple   • Ocular pain, right eye 10/07/2022    Pain in right eye   • Other abnormal and inconclusive findings on diagnostic imaging of breast 07/06/2020    Other abnormal and inconclusive findings on diagnostic imaging of breast   • Other anomalies of pupillary function 05/31/2019    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   • Other chest pain 05/18/2020    Chest discomfort   • Other conditions influencing health status 08/01/2022    History of cough   • Other conditions influencing health status 08/03/2021    Chronic migraine   • Other specified disorders of eustachian tube, left ear 11/18/2019    ETD (Eustachian tube dysfunction), left   • Other specified disorders of nose and nasal sinuses 03/24/2022    Nasal dryness   • Other specified disorders of nose and nasal sinuses 03/24/2022    Nasal crusting   • Pelvic and perineal pain 07/06/2020    Pelvic pain   • Personal history of other diseases of the circulatory system 04/07/2020    History of sinus tachycardia   • Personal history of other diseases of the circulatory system 04/07/2020    History of abnormal electrocardiography   • Personal history of other diseases of the circulatory system     History of Raynaud's syndrome   • Personal history of other diseases of the digestive system     History of irritable bowel syndrome   • Personal history of other diseases  of the digestive system 03/02/2020    History of oral pain   • Personal history of other diseases of the musculoskeletal system and connective tissue 01/19/2022    History of neck pain   • Personal history of other diseases of the musculoskeletal system and connective tissue 03/02/2021    History of scleroderma   • Personal history of other diseases of the musculoskeletal system and connective tissue 06/16/2020    History of muscle weakness   • Personal history of other diseases of the nervous system and sense organs 11/18/2019    History of hearing loss   • Personal history of other diseases of the nervous system and sense organs 09/21/2022    History of partial seizures   • Personal history of other diseases of the respiratory system 04/14/2021    History of asthma   • Personal history of other endocrine, nutritional and metabolic disease 02/17/2021    History of diabetes mellitus   • Personal history of other mental and behavioral disorders 05/27/2021    History of anxiety   • Personal history of other specified conditions 09/07/2022    History of nipple discharge   • Personal history of other specified conditions 10/16/2019    History of headache   • Personal history of other specified conditions 09/28/2022    History of lump of left breast   • Personal history of other specified conditions 09/16/2021    History of persistent cough   • Personal history of other specified conditions 02/01/2022    History of palpitations   • Personal history of other specified conditions 03/09/2022    History of headache   • Personal history of other specified conditions 02/12/2014    History of chest pain   • Personal history of other specified conditions 10/27/2021    History of nausea and vomiting   • Personal history of other specified conditions 10/16/2019    History of fatigue   • Personal history of other specified conditions 02/26/2021    History of orthopnea   • Personal history of other specified conditions 02/22/2021     History of shortness of breath   • Personal history of urinary calculi     H/O renal calculi   • Postural orthostatic tachycardia syndrome (POTS)     POTS (postural orthostatic tachycardia syndrome)   • Rash and other nonspecific skin eruption 03/15/2022    Rash   • Repeated falls 06/23/2021    Recurrent falls   • Right lower quadrant pain 10/14/2020    Abdominal pain, RLQ (right lower quadrant)   • Sjogren syndrome, unspecified (CMS/[facility])     History of Sjogren's disease   • Slow transit constipation 07/09/2020    Slow transit constipation   • Subarachnoid hemorrhage, traumatic (CMS/[facility]) 04/19/2023   • Traumatic subarachnoid hemorrhage with loss of consciousness of unspecified duration, subsequent encounter 03/15/2022    Subarachnoid hemorrhage following injury, with loss of consciousness, subsequent encounter   • Traumatic subarachnoid hemorrhage without loss of consciousness, subsequent encounter     Subarachnoid hemorrhage following injury, no loss of consciousness, subsequent encounter   • Unspecified disorder of refraction 10/07/2022    Refractive error   • Unspecified optic neuritis 11/06/2020    Right optic neuritis   • Unspecified optic neuritis 11/06/2020    Optic neuritis, right   • Unspecified visual loss 09/25/2019    Vision loss   • Venous insufficiency (chronic) (peripheral) 10/18/2021    Chronic venous insufficiency of lower extremity   • Viral infection, unspecified 01/11/2022    Nonspecific syndrome suggestive of viral illness   • Vitamin D deficiency, unspecified 09/28/2022    Vitamin D deficiency     Past Surgical History:   Procedure Laterality Date   • MR HEAD ANGIO WO IV CONTRAST  2/8/2021    MR HEAD ANGIO WO IV CONTRAST 2/8/2021 [facility] CL[facility]ICAL [facility]GAC[facility]   • MR NECK ANGIO WO IV CONTRAST  2/8/2021    MR NECK ANGIO WO IV CONTRAST 2/8/2021 [facility] CLINICAL LEGACY   • OTHER SURGICAL HISTORY  08/22/2019    Carpal tunnel surgery   • OTHER SURGICAL HISTORY  08/22/2019    Hysterectomy   • OTHER SURGICAL  HISTORY  08/22/2019    Venous access port placement   • OTHER SURGICAL HISTORY  08/22/2019    Cholecystectomy   • OTHER SURGICAL HISTORY  08/22/2019    Appendectomy   • OTHER SURGICAL HISTORY  08/22/2019    Pyloroplasty     Family History   Problem Relation Name Age of Onset   • Other (Perforated bowel) Mother     • Hyperlipidemia Father     • Hypertension Father     • Heart attack Father     • Other (S/P CABG) Father     • Stroke Sister     • Breast cancer Sister     • Lupus Sister     • Hyperlipidemia Brother     • Hypertension Brother     • Blindness Other Multiple    • Melanoma Other     • Asthma Other     • Other (hay fever) Other     • Allergies Other       Social History     Tobacco Use   • Smoking status: Never   • Smokeless tobacco: Never   Vaping Use   • Vaping Use: Never used   Substance Use Topics   • Alcohol use: Not Currently   • Drug use: Yes     Types: Benzodiazepines           Physical Exam  Constitutional:       Appearance: Normal appearance.   HENT:      Head: Normocephalic.      Right Ear: Tympanic membrane, ear canal and external ear normal.      Left Ear: Tympanic membrane, ear canal and external ear normal.      Nose: Nose normal.      Mouth/Throat:      Mouth: Mucous membranes are moist.      Pharynx: Oropharynx is clear. No oropharyngeal exudate or posterior oropharyngeal erythema.   Eyes:      Conjunctiva/sclera: Conjunctivae normal.      Pupils: Pupils are equal, round, and reactive to light.   Cardiovascular:      Rate and Rhythm: Normal rate and regular rhythm.      Pulses:           Radial pulses are 3+ on the right side and 3+ on the left side.        Dorsalis pedis pulses are 3+ on the right side and 3+ on the left side.      Heart sounds: Normal heart sounds. No murmur heard.     No friction rub. No gallop.   Pulmonary:      Effort: Pulmonary effort is normal. No respiratory distress.      Breath sounds: Normal breath sounds. No wheezing, rhonchi or rales.   Abdominal:      General:  Abdomen is flat. Bowel sounds are normal.      Palpations: Abdomen is soft.      Tenderness: There is no abdominal tenderness. There is no right CVA tenderness, left CVA tenderness, guarding or rebound. Negative signs include Garcia's sign and McBurney's sign.   Musculoskeletal:         General: No swelling or deformity.      Cervical back: Full passive range of motion without pain.      Right lower leg: No edema.      Left lower leg: No edema.   Lymphadenopathy:      Cervical: No cervical adenopathy.   Skin:     Capillary Refill: Capillary refill takes less than 2 seconds.      Coloration: Skin is not jaundiced.      Findings: No rash.   Neurological:      General: No focal deficit present.      Mental Status: She is alert and oriented to person, place, and time. Mental status is at baseline.      Gait: Gait is intact.   Psychiatric:         Mood and Affect: Mood normal.         Behavior: Behavior is cooperative.         Labs Reviewed   COMPREHENSIVE METABOLIC PANEL - Abnormal       Result Value    Glucose 162 (*)     Sodium 139      Potassium 3.7      Chloride 103      Bicarbonate 28      Anion Gap 12      Urea Nitrogen 24 (*)     Creatinine 0.66      eGFR >90      Calcium 9.0      Albumin 3.8      Alkaline Phosphatase 58      Total Protein 7.8      AST 21      Bilirubin, Total 0.3      ALT 34     LIPASE - Abnormal    Lipase 83 (*)     Narrative:     Venipuncture immediately after or during the administration of Metamizole may lead to falsely low results. Testing should be performed immediately prior to Metamizole dosing.   URINALYSIS WITH REFLEX CULTURE AND MICROSCOPIC - Abnormal    Color, Urine Yellow      Appearance, Urine Hazy (*)     Specific Gravity, Urine 1.027      pH, Urine 5.0      Protein, Urine NEGATIVE      Glucose, Urine NEGATIVE      Blood, Urine NEGATIVE      Ketones, Urine 20 (1+) (*)     Bilirubin, Urine NEGATIVE      Urobilinogen, Urine 2.0 (*)     Nitrite, Urine NEGATIVE      Leukocyte  Esterase, Urine NEGATIVE     HCG, URINE, QUALITATIVE - Normal    HCG, Urine NEGATIVE     CBC WITH AUTO DIFFERENTIAL    WBC 7.0      nRBC 0.0      RBC 4.69      Hemoglobin 13.9      Hematocrit 41.9      MCV 89      MCH 29.6      MCHC 33.2      RDW 13.9      Platelets 248      Neutrophils % 67.0      Immature Granulocytes %, Automated 0.9      Lymphocytes % 25.8      Monocytes % 6.1      Eosinophils % 0.1      Basophils % 0.1      Neutrophils Absolute 4.69      Immature Granulocytes Absolute, Automated 0.06      Lymphocytes Absolute 1.81      Monocytes Absolute 0.43      Eosinophils Absolute 0.01      Basophils Absolute 0.01     URINALYSIS WITH REFLEX CULTURE AND MICROSCOPIC    Narrative:     The following orders were created for panel order Urinalysis with Reflex Culture and Microscopic.  Procedure                               Abnormality         Status                     ---------                               -----------         ------                     Urinalysis with Reflex C...[[id number]]  Abnormal            Final result               Extra Urine Gray Tube[[id number]]                            In process                   Please view results for these tests on the individual orders.   EXTRA URINE GRAY TUBE       CT abdomen pelvis wo IV contrast   Final Result   No acute process. No renal/ureteral calculi.  Unchanged from prior   exam.   Signed by Rick Kern MD      XR chest 2 views   Final Result   No acute process.   Signed by Rick Kern MD               ED Course & MDM   ED Course as of 02/22/24 1222   Thu Feb 22, 2024   1158 LIPASE(!): 83 [JZ]      ED Course User Index  [JZ] Broderick Kruse, RAUL-CNP         Diagnoses as of 02/22/24 1222   Acute right-sided thoracic back pain           Medical Decision Making  Patient was seen and evaluated by Dr. Kunz. Saline lock was established with labs drawn and results as above. Given 1 Liter of normal saline wide open over 1 hour. Given Zofran and  "Toradol as well. After receiving the medication and IV fluids, reported feeling improved. Later, she was given a dose of Morphine and Benadryl with improvement in her pain. Blood counts, electrolytes, liver function, and kidney function were unremarkable. Minimal elevation in her lipase at 83. Urine pregnancy way negative. Urinalysis showed no signs of infection. CXR and CT scan of her abdomen and pelvis shows no acute pathology. Repeat abdominal evaluation reveals an abdomen soft, nondistended, and nontender to palpation. There was no rebound, rigidity, or guarding noted. There were no peritoneal signs noted. Continued to have multiple benign serial abdominal exams. At this time, we find no underlying evidence of acute pancreatitis, cholecystitis, cholangitis, diverticulitis, or appendicitis. We are unclear as to the cause of her pain. Likely muscular in nature.  Take Tylenol and Advil over the counter as needed for fever and/or pain. No contraindications to NSAIDs are noted. Continue to take her Tizanidine as previously directed. Follow up with their doctor in 3 days. Return if worse in any way. Discharged in stable condition with computer instructions.    Diagnostic Impression:     1. Acute right upper back/flank pain    2. IV meds in ED               Your medication list        CONTINUE taking these medications        Instructions Last Dose Given Next Dose Due   BD Ultra-Fine Mini Pen Needle 31 gauge x 3/16\" needle  Generic drug: pen needle, diabetic           OneTouch Delica Plus Lancet 33 gauge misc  Generic drug: lancets                  ASK your doctor about these medications        Instructions Last Dose Given Next Dose Due   acetaminophen 325 mg capsule  Commonly known as: Tylenol           Aimovig Autoinjector 140 mg/mL injection  Generic drug: erenumab      INJECT 140 MG (1 PEN) UNDER THE SKIN EVERY 30 DAYS       amitriptyline 25 mg tablet  Commonly known as: Elavil           amLODIPine 5 mg " tablet  Commonly known as: Norvasc           ascorbic acid 1,000 mg tablet  Commonly known as: Vitamin C           carboxymethylcellulose 1 % ophthalmic solution dropperette  Commonly known as: Refresh Celluvisc           cholecalciferol 5,000 Units tablet  Commonly known as: Vitamin D-3           clotrimazole 1 % cream  Commonly known as: Lotrimin           dapagliflozin propanediol 5 mg  Commonly known as: Farxiga      Take 1 tablet (5 mg) by mouth once daily in the morning. Take before meals. Take 1 tablet (5 mg) by mouth once daily in the morning. Take before meals.       diclofenac 50 mg EC tablet  Commonly known as: Voltaren      Take 1 tablet (50 mg) by mouth 3 times a day as needed (migraine). 30 day supply       diphenhydrAMINE 50 mg/mL injection  Commonly known as: BENADryl           divalproex 500 mg 24 hr tablet  Commonly known as: Depakote ER           EPINEPHrine 0.3 mg/0.3 mL injection syringe  Commonly known as: Epipen      INJECT BY INTRAMUSCULAR ROUTE ONCE AS NEEDED FOR ANAPHYLAXIS       ferrous gluconate 240 (27 Fe) MG tablet  Commonly known as: Fergon           Flowflex COVID-19 Ag Home Test kit  Generic drug: COVID-19 antigen test           fluticasone 50 mcg/actuation nasal spray  Commonly known as: Flonase      Administer 1 spray into each nostril once daily.       folic acid 1 mg tablet  Commonly known as: Folvite      Take 1 tablet (1 mg) by mouth once daily.       furosemide 20 mg tablet  Commonly known as: Lasix      Take 1 tablet (20 mg) by mouth 2 times a day.       Baqsimi 3 mg/actuation spray,non-aerosol  Generic drug: glucagon           Glucagon Emergency Kit (human) 1 mg injection  Generic drug: glucagon           hydrocortisone 10 mg tablet  Commonly known as: Cortef           immune globulin (human) infusion  Commonly known as: Gammagard           insulin lispro 100 unit/mL injection  Commonly known as: HumaLOG           ipratropium-albuteroL 0.5-2.5 mg/3 mL nebulizer  solution  Commonly known as: Duo-Neb           KlonoPIN 0.5 mg tablet  Generic drug: clonazePAM           lacosamide 200 mg tablet tablet  Commonly known as: Vimpat           lacosamide 50 mg tablet  Commonly known as: Vimpat           levETIRAcetam 750 mg tablet  Commonly known as: Keppra           levocetirizine 5 mg tablet  Commonly known as: Xyzal           levothyroxine 50 mcg tablet  Commonly known as: Synthroid, Levoxyl      Take 1.5 tablets (75 mcg) by mouth once daily in the morning. Take before meals.       melatonin 3 mg tablet           montelukast 10 mg tablet  Commonly known as: Singulair           Motegrity 2 mg tablet  Generic drug: prucalopride           nasal spray Nayzilam 5 mg/spray (0.1 mL) spray,non-aerosol  Generic drug: midazolam           OneTouch Verio test strips strip  Generic drug: blood sugar diagnostic           Ozempic 1 mg/dose (4 mg/3 mL) pen injector  Generic drug: semaglutide      Inject 1 mg under the skin 1 (one) time per week.       pantoprazole 40 mg EC tablet  Commonly known as: ProtoNix      Take 1 tablet (40 mg) by mouth once daily. Do not crush, chew, or split.       promethazine 12.5 mg tablet  Commonly known as: Phenergan           propranolol 10 mg tablet  Commonly known as: Inderal      Take 1 tablet (10 mg) by mouth 3 times a day.       Qulipta 60 mg tablet tablet  Generic drug: atogepant      Take 1 tablet (60 mg) by mouth once daily.       rOPINIRole 1 mg tablet  Commonly known as: Requip           rOPINIRole 0.5 mg tablet  Commonly known as: Requip           senna 8.6 mg tablet  Generic drug: sennosides           tiZANidine 2 mg tablet  Commonly known as: Zanaflex      Take 1 tablet (2 mg) by mouth every 8 hours if needed for muscle spasms.       Toujeo Max U-300 SoloStar 300 unit/mL (3 mL) injection  Generic drug: insulin glargine      INJECT 66 UNITS UNDER THE SKIN ONCE DAILY       Ubrelvy 100 mg tablet tablet  Generic drug: ubrogepant      TAKE ONE (1) TABLET AT  ONSET OF MIGRAINE, MAY REPEAT IN 2 HOURS. MAX DOSE 200MG (2 TABLETS) IN 24 HOURS.       ZINC ORAL                      Procedure  Procedures     Broderick Kruse, APRN-CNP  02/22/24 [id number]

## 2024-02-22 NOTE — TELEPHONE ENCOUNTER
Patient was recently in Ed and was told to follow up in 3 days. Also needs an appointment for waiver program.

## 2024-02-23 ENCOUNTER — LAB (OUTPATIENT)
Dept: LAB | Facility: LAB | Age: 49
End: 2024-02-23
Payer: MEDICAID

## 2024-02-23 ENCOUNTER — APPOINTMENT (OUTPATIENT)
Dept: ENDOCRINOLOGY | Facility: CLINIC | Age: 49
End: 2024-02-23
Payer: MEDICAID

## 2024-02-23 DIAGNOSIS — E03.8 HYPOTHYROIDISM DUE TO HASHIMOTO'S THYROIDITIS: ICD-10-CM

## 2024-02-23 DIAGNOSIS — E04.1 THYROID NODULE: ICD-10-CM

## 2024-02-23 DIAGNOSIS — E06.3 HYPOTHYROIDISM DUE TO HASHIMOTO'S THYROIDITIS: ICD-10-CM

## 2024-02-23 LAB
T3 SERPL-MCNC: 71 NG/DL (ref 60–200)
T3FREE SERPL-MCNC: 2.3 PG/ML (ref 2.3–4.2)

## 2024-02-23 PROCEDURE — 84480 ASSAY TRIIODOTHYRONINE (T3): CPT

## 2024-02-23 PROCEDURE — 36415 COLL VENOUS BLD VENIPUNCTURE: CPT

## 2024-02-23 PROCEDURE — 84481 FREE ASSAY (FT-3): CPT

## 2024-03-04 PROCEDURE — RXMED WILLOW AMBULATORY MEDICATION CHARGE

## 2024-03-06 ENCOUNTER — PHARMACY VISIT (OUTPATIENT)
Dept: PHARMACY | Facility: CLINIC | Age: 49
End: 2024-03-06
Payer: MEDICAID

## 2024-03-08 ENCOUNTER — TELEMEDICINE CLINICAL SUPPORT (OUTPATIENT)
Dept: ENDOCRINOLOGY | Facility: CLINIC | Age: 49
End: 2024-03-08
Payer: MEDICAID

## 2024-03-08 PROCEDURE — 99211 OFF/OP EST MAY X REQ PHY/QHP: CPT | Performed by: PHARMACIST

## 2024-03-08 NOTE — PROGRESS NOTES
Clinical Pharmacy Team met with Jonathan Ayala regarding a consultation for diabetes management thanks to a referral from Dr. Marah Felix MD. Below is a summary of our conversation and recommendations:    ________________________________________________________________________      Allergies   Allergen Reactions    Acetazolamide Hives, Rash, Shortness of breath and Swelling     oral hives, redness, ABD pain    Atorvastatin Unknown     Causes muscle pain    Cefdinir Itching, Rash, Shortness of breath and Anaphylaxis     Itchy throat    Throat closing / difficulty breathing.  Took Benadryl at home.    Itchy throat      Throat closing / difficulty breathing.  Took Benadryl at home.    Ceftriaxone Anaphylaxis, Rash, Shortness of breath and Swelling     SOB    SOB hives turned red during gallbladder    Doxepin Anaphylaxis, Hives, Swelling, Angioedema and Rash     Itchy sore throat problems with swallowing    facial swelling    Itchy sore throat problems with swallowing      facial swelling    Duloxetine Anaphylaxis, Hallucinations and Rash     suicidal ideation    suicidal ideation     Other reaction(s): suicidal ideation    suicidal    Suicidal ideation    Levofloxacin Angioedema, Swelling and Rash     eyelid swelling    eyelid swelling. Patient tolerates Avelox    Levofloxacin In D5w Anaphylaxis     Moon face lips swelling just Levaquin tolerated D5W)    Nutritional Supplements Anaphylaxis     Grapes ( red dye    Prochlorperazine Anaphylaxis     HIGH Compazine involuntary muscle spasms low doses tolerated)    Red Dye Anaphylaxis    Becky Anaphylaxis and Swelling     Becky    SOB facial swelling    Rosemary Oil Anaphylaxis, Itching and Swelling     Becky  SOB facial swelling      SOB facial swelling    Strawberry Shortness of breath     White blisters in mouth      Other reaction(s): white blisters in mouth, shortness of breath    Strawberry Flavor Anaphylaxis     Allergy to strawberry fruit and juice     "Sulfa (Sulfonamide Antibiotics) Anaphylaxis, Rash, Shortness of breath and Swelling     SOB itchy hives    Other Reaction(s): rash, SOB      SOB itchy hives    Topiramate Anaphylaxis, Swelling and Angioedema     eyelid swelling    Throat swelling    eyelid swelling  Throat swelling      Throat swelling      eyelid swelling    Tree Nuts Shortness of breath     walnuts    Tree Pollen-Black [place] Shortness of breath     Other Reaction(s): Other: See Comments      White blisters in mouth      blisters in mouth-carries an EPIPEN-\"I have gotten short of breath\"    Tree Pollen-Pecan Shortness of breath     pecans    [id number] Antimigraine Agents Anaphylaxis and Nausea Only     \"[id number]- anti migraine agents and DHE: can cause stroke per pt \"    None due to Raynaud's  ( Triptans cause a stroke)    [place] Shortness of breath    Aripiprazole Unknown, Fever and Other     fever and tremors    Fevers and Tremors    Tremor    Aspartame Diarrhea     Blood sugar [place], Diarrhea    Aspartame (Bulk) Diarrhea, Nausea Only and Nausea/vomiting     Other Reaction(s): nausea, diarrhea, abdominal pain      Blood sugar [place], Diarrhea    Fenofibrate Other     Double vision    Gluten Itching, Other and Rash     Rashes constipation gastric discomfort    Hydrochlorothiazide Hives, Itching and Rash     hctz removed as free-text allergy and entered as [facility] allergy,1455 10/6/2007JO      itchy hives    Hydromorphone Unknown, GI intolerance and Nausea Only     Intolerance nausea instant    Iron Hives and Rash     ichy hive ( can tolerate ferrous gluconate)    Meclizine Angioedema, Other and Swelling     eyelid swelling    Swelling of the eyelids    Eyelids and face    Metformin Other     Other reaction(s): Dyspepsia, Dyspepsia    Propoxyphene Unknown and Hives     Darvocet itchy hives    Statins-Hmg-Coa Reductase Inhibitors Unknown     Double vision    Tetracyclines Hives, Itching and Rash     sulfa    Rash itching    Itchy  rash    " Thiazides Hives, Itching and Rash     itchy hives    Venlafaxine Unknown and Fever     Fevers chills    Wheat Unknown     oral blisters    Adhesive Tape-Silicones Itching and Other    Barbiturates Unknown    Dhe Unknown     Raynaud's disease    Diet Foods Diarrhea     Aspartame allergy only. Removes to many foods to interface.    Ferrous Sulfate Hives    Nsaids (Non-Steroidal Anti-Inflammatory Drug) Unknown and Nausea/vomiting    Other Unknown    Sulfonylureas Unknown    Tobramycin Unknown    Vancomycin Unknown and Hives     Niyah syndrome    Other reaction(s): Other    Red man syndrome if given fast, benadryl with.     Niyah syndrome  Other reaction(s): Other      Other reaction(s): Other      Red man syndrome if given fast, benadryl with.    Adhesive Rash    Azithromycin Hives, Itching and Rash    Betamethasone Nausea And Vomiting, Other, GI intolerance and Diarrhea     N/V/D    House Dust Rash    Metoclopramide Hcl Other    Milk Unknown, Itching and Rash     ABD pain    Prednisone Rash    Propoxyphene-Acetaminophen Hives and Rash    Sulfacetamide Sodium Rash and Swelling    Zolpidem Other and Hallucinations     Sleep walk    Other Reaction(s): insomnia and agitation      Sleep walk         Diabetes Pharmacotherapy:   Toujeo 44 units subcutaneous once daily  Humalog with an ICR of 1:3 and ISF of 1:30  Farxiga 10 mg once daily    Patient reports tolerating this regimen well.      Lab Review  Lab Results   Component Value Date    BILITOT 0.3 08/06/2023    CALCIUM 9.5 08/06/2023    CO2 23 08/06/2023     08/06/2023    CREATININE 0.82 08/06/2023    GLUCOSE 135 (H) 08/06/2023    ALKPHOS 81 08/06/2023    K 4.2 08/06/2023    PROT 8.8 (H) 08/06/2023     08/06/2023    AST 21 08/06/2023    ALT 27 08/06/2023    BUN 17 08/06/2023    ANIONGAP 16 08/06/2023    MG 2.16 07/28/2023    PHOS 3.8 04/30/2023    LDH 95 07/17/2020    ALBUMIN 4.3 08/06/2023    LIPASE 20 04/30/2023    GFRF 89 08/06/2023    GFRMALE CANCELED  2023     Lab Results   Component Value Date    TRIG CANCELED 2023    CHOL CANCELED 2023    HDL CANCELED 2023     Lab Results   Component Value Date    HGBA1C 5.9 (H) 2023    HGBA1C 7.8 (A) 2023    HGBA1C 8.3 (A) 2022     The 10-year ASCVD risk score (Mamie SANDERSON, et al., 2019) is: 3.4%    Values used to calculate the score:      Age: 48 years      Sex: Female      Is Non- : No      Diabetic: Yes      Tobacco smoker: No      Systolic Blood Pressure: 146 mmHg      Is BP treated: Yes      HDL Cholesterol: 46.3 mg/dL      Total Cholesterol: 186 mg/dL          Assessment/Plan     The patient reports today for a diabetes consultation. Reviewed CGM report and discussed it with the patient. The patient is scheduled to undergo inguinal herniopathy and is here for periprocedural medication adjustments. Discussed this with the patient. She is to stop farxiga 3 days before procedure. It is okay to restart the medication once discharged and eating and drinking normally. Given her hyperglycemia recommend continuing current insulin regimen up until the day of the surgery. If hyperglycemic on the morning of the surgery she can give sliding scale corrections every 2 hours. Patient was agreeable to this plan.      A minimum of 72 hours of CGM data was reviewed and used to make therapy decisions.       PATIENT EDUCATION/GOALS    Goals  Fasting B - 130 mg/dL  Postprandial BG: less than 180 mg/dL  A1c: less than 7%    Type of encounter: [virtual]    Provided counseling on lifestyle modifications, medications, and self-monitoring. Patient has no additional questions at this time. Pharmacy to follow up in 6 weeks. Please reach out with any questions. Thank you.       Joanna Evans, PharmD    Provider on site: Jazz Mccall CNP  Continue all meds under the continuation of care with the referring provider and clinical pharmacy

## 2024-03-11 PROCEDURE — RXMED WILLOW AMBULATORY MEDICATION CHARGE

## 2024-03-12 ENCOUNTER — TELEPHONE (OUTPATIENT)
Dept: ENDOCRINOLOGY | Facility: CLINIC | Age: 49
End: 2024-03-12
Payer: MEDICAID

## 2024-03-12 NOTE — TELEPHONE ENCOUNTER
Prior authorization for Synthroid submitted to the insurance on behalf of Jonathan Ayala   Office waiting on response . Geovanna Evangelista LPN

## 2024-03-13 ENCOUNTER — SPECIALTY PHARMACY (OUTPATIENT)
Dept: PHARMACY | Facility: CLINIC | Age: 49
End: 2024-03-13

## 2024-03-14 ENCOUNTER — PHARMACY VISIT (OUTPATIENT)
Dept: PHARMACY | Facility: CLINIC | Age: 49
End: 2024-03-14
Payer: MEDICAID

## 2024-03-14 DIAGNOSIS — G43.711 CHRONIC MIGRAINE WITHOUT AURA, INTRACTABLE, WITH STATUS MIGRAINOSUS: ICD-10-CM

## 2024-03-14 DIAGNOSIS — E04.1 THYROID NODULE: ICD-10-CM

## 2024-03-14 RX ORDER — LEVOTHYROXINE SODIUM 50 UG/1
75 TABLET ORAL
Qty: 45 TABLET | Refills: 11 | Status: SHIPPED | OUTPATIENT
Start: 2024-03-14

## 2024-03-14 NOTE — TELEPHONE ENCOUNTER
Patient requesting refill of levothyroxine 50 mcg tabs , insurance with not cover synthroid. Order pended to provider. Geovanna Evangelista LPN

## 2024-03-15 ENCOUNTER — OFFICE VISIT (OUTPATIENT)
Dept: OPHTHALMOLOGY | Facility: CLINIC | Age: 49
End: 2024-03-15
Payer: MEDICAID

## 2024-03-15 DIAGNOSIS — H57.09 RELATIVE AFFERENT PUPILLARY DEFECT OF LEFT EYE: ICD-10-CM

## 2024-03-15 DIAGNOSIS — H47.20 OPTIC ATROPHY: ICD-10-CM

## 2024-03-15 DIAGNOSIS — H47.012 NAION (NON-ARTERITIC ANTERIOR ISCHEMIC OPTIC NEUROPATHY), LEFT EYE: ICD-10-CM

## 2024-03-15 DIAGNOSIS — G93.2 BENIGN INTRACRANIAL HYPERTENSION: Primary | ICD-10-CM

## 2024-03-15 DIAGNOSIS — R04.0 EPISTAXIS: ICD-10-CM

## 2024-03-15 PROCEDURE — 92083 EXTENDED VISUAL FIELD XM: CPT | Performed by: PSYCHIATRY & NEUROLOGY

## 2024-03-15 PROCEDURE — 92133 CPTRZD OPH DX IMG PST SGM ON: CPT | Performed by: PSYCHIATRY & NEUROLOGY

## 2024-03-15 PROCEDURE — 99214 OFFICE O/P EST MOD 30 MIN: CPT | Performed by: PSYCHIATRY & NEUROLOGY

## 2024-03-15 ASSESSMENT — CONF VISUAL FIELD
OD_INFERIOR_TEMPORAL_RESTRICTION: 0
OD_SUPERIOR_NASAL_RESTRICTION: 0
OS_INFERIOR_NASAL_RESTRICTION: 3
OD_SUPERIOR_TEMPORAL_RESTRICTION: 0
OS_SUPERIOR_NASAL_RESTRICTION: 3
OD_INFERIOR_NASAL_RESTRICTION: 0
OD_NORMAL: 1
OS_INFERIOR_TEMPORAL_RESTRICTION: 3

## 2024-03-15 ASSESSMENT — SLIT LAMP EXAM - LIDS
COMMENTS: NORMAL
COMMENTS: NORMAL

## 2024-03-15 ASSESSMENT — ENCOUNTER SYMPTOMS
ENDOCRINE NEGATIVE: 0
HEMATOLOGIC/LYMPHATIC NEGATIVE: 0
MUSCULOSKELETAL NEGATIVE: 0
CONSTITUTIONAL NEGATIVE: 0
EYES NEGATIVE: 1
GASTROINTESTINAL NEGATIVE: 0
NEUROLOGICAL NEGATIVE: 0
RESPIRATORY NEGATIVE: 0
ALLERGIC/IMMUNOLOGIC NEGATIVE: 0
CARDIOVASCULAR NEGATIVE: 0
PSYCHIATRIC NEGATIVE: 0

## 2024-03-15 ASSESSMENT — VISUAL ACUITY
OD_CC: 20/25
OD_CC+: -3
METHOD: SNELLEN - LINEAR
OS_CC: 20/200

## 2024-03-15 ASSESSMENT — REFRACTION_WEARINGRX
OD_AXIS: 015
OD_SPHERE: -1.50
OS_SPHERE: -1.25
OS_CYLINDER: -3.00
OS_AXIS: 170
OD_CYLINDER: -3.00

## 2024-03-15 ASSESSMENT — EXTERNAL EXAM - LEFT EYE: OS_EXAM: NORMAL

## 2024-03-15 ASSESSMENT — TONOMETRY
OD_IOP_MMHG: 19
OS_IOP_MMHG: 19
IOP_METHOD: GOLDMANN APPLANATION

## 2024-03-15 ASSESSMENT — CUP TO DISC RATIO
OD_RATIO: 0.15
OS_RATIO: 0.15

## 2024-03-15 ASSESSMENT — EXTERNAL EXAM - RIGHT EYE: OD_EXAM: NORMAL

## 2024-03-15 NOTE — PROGRESS NOTES
"Assessment and Plan    Assessment and Plan    06/08/2021 +OKN response OD  09/30/2019 +OKN response OD    11/07/2023 MRI brain without contrast, by report from [facility], shows \"No acute intracranial abnormality including no evidence of an acute or recent brain parenchymal infarct. \"  11/07/2023 CTA head & neck & CT head without contrast, by report from [facility], show \"No acute abnormality of the cervical and intracranial vasculature.\" And \"No evidence of an acute intracranial process.     Focal RIGHT frontal lobe encephalomalacia deep to a sherley hole, new from   02/25/2020. \"  08/01/2023 MRV head, which I personally reviewed, shows no lesion.  07/29/2023 MRI brain & orbits with contrast, which I personally reviewed, shows right frontal encephalomalacia consistent with prior bolt.  Interval head imaging.    10/05/2022 CT head without contrast & CTA head & neck, by report from [facility], show \" 1.   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  Overlying right-sided sherley hole.)  2.  Prominence of the ventricles and sulci for age.  \" and \"1. Similar appearing old areas of infarction involving the right and left frontal lobes extending to the convexities with underlying encephalomalacia at site of prior hemorrhage from 02/13/2022. Overlying right-sided sherley hole. Prominence of the ventricles and sulci for age. No evidence of acute infarction. No active hemorrhage. 2. No significant stenosis internal carotid arteries. 3. No significant stenosis of the intracranial vessels or evidence of aneurysm. 4. Aberrant right subclavian artery behind the esophagus with no dilation of the proximal esophagus.\"  09/25/2022 CT head without contrast, which I personally reviewed previously, shows no lesion.  04/20/2022 CT head without contrast, which I personally reviewed " previously, shows right frontal encephalomalacia judged stable by Radiology.  Interval head imaging.  09/10/2021 MRI brain with contrast, which I personally reviewed previously, shows no lesion.  09/09/2021 CTA head & neck, which I personally reviewed previously, shows no lesion.  09/09/2021 CT head without contrast, which I personally reviewed previously, shows no lesion.  08/11/2021 MRI brain & orbits with contrast & MRV head, which I personally reviewed previously, show left optic atrophy with Radiology noting “Punctate focus of enhancement seen along the left 7th-8th cranial nerve bundle at the level of the porus acusticus, indeterminate. Otherwise unremarkable MRI of the brain.”  Interval head imaging.  06/04/2021 MRI brain & orbits with contrast, which I personally reviewed previously, shows left optic atrophy.  Interval head imaging.  06/29/2017 MRI brain without contrast, which I personally reviewed previously, shows no lesion.  11/22/2007 CT head without contrast, which I personally reviewed previously, shows no lesion.    08/31/2023 lumbar puncture tube 1: , WBC 0, tube 4: RBC 6 & WBC 0, protein 91 & glucose 71.  08/11/2021 lumbar puncture opening pressure 18 cm water, tube 1: RBC 0, WBC 16 (86% L, 13% M), tube 4: RBC 0 & WBC 16 (92% L, 8% M), protein 71 & glucose 61. Cytology negative. Flow cytometry negative. Oligoclonal bands 0. IgG studies with high CSF IgG & albumin.  08/05/2020 lumbar puncture opening pressure 20 cm water, protein 68, glucose 85. MBP wnl. Oligoclonal bands POSITIVE 4.  12/26/2019 lumbar puncture no opening pressure checked per patient) tube ?: RBC 39, WBC 6 (91% L, 9% M), tube ?: RBC 38, WBC 5, protein 89, glucose 79. (via [employer])  11/13/2019 lumbar puncture opening pressure 22 cm water, tube 1: RBC 9, WBC 4, tube 4: RBC 1 & WBC 5, protein 82 & glucose 61. Oligoclonal bands 0. IgG studies with non-specific abnormalities. HSV PCR negative.  09/20/2019 lumbar  puncture opening pressure 20 cm water, RBC 1, WBC 7 (96% L, 4% M), protein 94 & glucose 78.  01/31/2015 lumbar puncture RBC 2, WBC 0, protein 104 & glucose 74.    07/27/2019 multifocal ERG with mildly reduced central responses.  Pattern VEP with OD borderline latency at 0.25 degrees and OS with severely prolonged latencies and decreased amplitudes.    Lab Results   Component Value Date/Time    SEDRATE 19 08/01/2023 1558    SEDRATE 33 (H) 08/07/2022 0322    SEDRATE 19 05/25/2022 1501    SEDRATE 3 06/05/2020 1110    SEDRATE 6 05/13/2020 1529    SEDRATE 3 03/28/2020 0629    SEDRATE 5 09/16/2019 0507    SEDRATE 8 08/19/2019 1314    CRP 0.90 08/07/2022 0322    CRP 0.35 05/25/2022 1501    CRP 0.34 09/23/2021 0618    CRP 0.71 09/21/2021 0648    CRP 0.14 07/16/2020 0944    CRP 0.10 06/05/2020 1110    CRP <0.10 05/13/2020 1529    CRP <0.10 03/28/2020 0629    CRP 3.64 (A) 11/21/2019 2157    CRP <0.10 08/19/2019 1314      Lab Results   Component Value Date/Time    ZOJUDKSX95 299 02/23/2023 0941    WUXQELSB42 709 02/09/2021 0451    QTOOUZET94 508 12/10/2019 1349    RCFOL 951 12/10/2019 1349      Lab Results   Component Value Date/Time    NMOAQP4 Negative 11/14/2019 1447    MOGFACS Negative 11/10/2020 1000    MOGFACS Negative 11/14/2019 1447    ACE 26 11/10/2020 1000      Tuberculosis studies  Lab Results   Component Value Date/Time    TBSIN Negative 09/22/2021 0430    TBSIN Negative 11/10/2020 1000    TBSIN Negative 11/14/2019 0531      12/04/2023 ESR 10. CRP <0.3 mg/dL.  10/01/2023 syphilis non-reactive (NR).  09/18/2020 ESR 1. CRP < 0.08 mg/dL (0.8 mg/L).  08/28/2020 HbA1c HIGH 7.0. Lipid panel with LDL 64.  08/05/2020 B12 462. Folate wnl. AQP4 ab negative.  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 Suarez.  07/09/2019 BRETT > 1:640. Anti-centromere HIGH 101 (0-40). scl-70 negative. Chromatin ab IgG, anti-ribosomal ab, anti-Sarahy ab, anti-DNA ab negative.    03/15/2024 OCT RNFL OD 92 & OS 36.  (Stable)  01/09/2024 OCT RNFL OD 89 & OS 36. (Stable)  02/06/2023 OCT RNFL OD 92 & OS 38. (stable)  OCT macula OD normal foveal contour 255 & OS thinning RNFL miscentered wrt fovea.  10/07/2022 OCT RNFL OD 92 & OS 40. (decreased OD & stable OS)  09/23/2022 OCT RNFL OD 98 & OS 38. (increased OD & stable OS)  01/27/2022 OCT RNFL OD 88 & OS 37. (stable)  10/06/2021 OCT RNFL OD 93 & OS 39 (stable)..  08/19/2021 OCT RNFL OD 92 & OS 38. (stable)  06/08/2021 OCT RNFL OD 87 & OS 37. (stable)  01/28/2021 OCT RNFL OD 85 & OS 37. (stable)  11/06/2020 OCT RNFL OD 89 & OS 39. (stable)  07/27/2020 OCT RNFL OD 89 & OS 38. (stable)  01/07/2020 OCT RNFL OD 93 & OS 38. (stable to mild increase OD, stable to mild decrease OS)  11/27/2019 OCT RNFL OD 84 & OS 42. (stable to mild OD decrease)  09/30/2019 OCT RNFL OD 89 & OS 41. (stable)  07/18/2019 OCT RNFL OD 90 & OS 39.  OCT macula OD normal foveal contour 256 & OS thinning of RNFL & GCL, but preserved foveal contour 238.    03/15/2024 HVF 24-2 OD fovea 36, wnl MD -1.05 & OS stimulus V, fovea 3, inferior arcuate > superior arcuate.  01/09/2024 HVF 24-2 OD wnl MD -0.94 & OS generalized depression MD -32.10.  02/06/2023 HVF 24-2 OD fovea 36, FL 1/17, FP 0%< FN 15%, scatter MD -7.58 & OS stimulus V, fovea 19, generalized depression.  10/07/2022 HVF 24-2 OD fovea 35, wnl MD -1.26 & OS fovea 5, generalized depression MD -30.62.  09/23/2022 HVF 24-2 OD fovea 38, scatter MD -2.67 & OS stimulus V, fovea 16, superior arcuate & inferior arcuate.  01/27/2022 HVF 24-2 OD fovea 36, wnl MD -1.67 & OS stimulus V, fovea 28, generalized reduction.  10/06/2021 HVF 24-2 OD fovea 36, scatter MD -4.76 & OS stimulus V, generalized reduction.  08/19/2021 HVF 24-2 OD fovea 39, wnl MD -2.31 & OS stimulus V, fovea 28, generalized depression.  06/08/2021 HVF Stimulus V OD fovea 34, wnl & OS stimulus V, fovea 24, generalized depression.  01/28/2021 HVF 24-2 OD fovea 40, wnl MD -0.63 & OS stimulus V, fovea 28,  superior nasal step & inferior arcuate.  11/06/2020 HVF 24-2 OD fovea 32, possible inferior > superior arcuate MD -14.71 & OS stimulus V, fovea 10, generalized depression.  07/27/2020 HVF 24-2 OD fovea 39, wnl MD -2.45 & OS stimulus V, fovea 27, superior & inferior altitudinal.    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    Vision appears stable, and right eye vision seems nearly normal. The left eye impaired vision is stable. I do no suspect optic neuritis. I do not see any evidence of reactivation of idiopathic intracranial hypertension. The left eye remains consistent with prior non-arteritic anterior ischemic optic neuropathy.    I recommend artificial tears and updating refractive rare as planned.    Regarding epistaxis, I recommend seeing an otolaryngologist for evaluation of a treatable source.    Plan    Artificial tears.  Weight loss and blood sugar regulation.  Continue with Dr. Bai.  Vasculopathic risk factor control.  Continue off acetazolamide.  Referral to otolaryngologist.    Follow up in 6 months with HVF & OCT. (dilated 3/15/2024)

## 2024-03-18 ENCOUNTER — APPOINTMENT (OUTPATIENT)
Dept: ENDOCRINOLOGY | Facility: CLINIC | Age: 49
End: 2024-03-18
Payer: MEDICAID

## 2024-03-20 ENCOUNTER — TELEPHONE (OUTPATIENT)
Dept: ENDOCRINOLOGY | Facility: CLINIC | Age: 49
End: 2024-03-20
Payer: MEDICAID

## 2024-03-20 NOTE — TELEPHONE ENCOUNTER
CMN for Dexcom faxed on behalf of Jonathan Ayala to [employer] at [phone] as requested. Geovanna Evangelista LPN

## 2024-03-22 DIAGNOSIS — G43.711 CHRONIC MIGRAINE WITHOUT AURA, INTRACTABLE, WITH STATUS MIGRAINOSUS: ICD-10-CM

## 2024-03-27 ENCOUNTER — SPECIALTY PHARMACY (OUTPATIENT)
Dept: PHARMACY | Facility: CLINIC | Age: 49
End: 2024-03-27

## 2024-03-27 ENCOUNTER — APPOINTMENT (OUTPATIENT)
Dept: PRIMARY CARE | Facility: CLINIC | Age: 49
End: 2024-03-27
Payer: MEDICAID

## 2024-04-01 PROCEDURE — RXMED WILLOW AMBULATORY MEDICATION CHARGE

## 2024-04-02 ENCOUNTER — TELEPHONE (OUTPATIENT)
Dept: ENDOCRINOLOGY | Facility: CLINIC | Age: 49
End: 2024-04-02

## 2024-04-02 ENCOUNTER — TELEMEDICINE (OUTPATIENT)
Dept: ENDOCRINOLOGY | Facility: CLINIC | Age: 49
End: 2024-04-02
Payer: MEDICAID

## 2024-04-02 ENCOUNTER — PATIENT MESSAGE (OUTPATIENT)
Dept: ENDOCRINOLOGY | Facility: CLINIC | Age: 49
End: 2024-04-02

## 2024-04-02 ENCOUNTER — PHARMACY VISIT (OUTPATIENT)
Dept: PHARMACY | Facility: CLINIC | Age: 49
End: 2024-04-02
Payer: MEDICAID

## 2024-04-02 DIAGNOSIS — E11.43 TYPE 2 DIABETES MELLITUS WITH DIABETIC AUTONOMIC NEUROPATHY, WITH LONG-TERM CURRENT USE OF INSULIN (MULTI): ICD-10-CM

## 2024-04-02 DIAGNOSIS — E04.1 THYROID NODULE: ICD-10-CM

## 2024-04-02 DIAGNOSIS — Z79.4 TYPE 2 DIABETES MELLITUS WITH DIABETIC AUTONOMIC NEUROPATHY, WITH LONG-TERM CURRENT USE OF INSULIN (MULTI): Primary | ICD-10-CM

## 2024-04-02 DIAGNOSIS — E11.9 CONTROLLED TYPE 2 DIABETES MELLITUS WITHOUT COMPLICATION, UNSPECIFIED WHETHER LONG TERM INSULIN USE (MULTI): ICD-10-CM

## 2024-04-02 DIAGNOSIS — Z79.4 TYPE 2 DIABETES MELLITUS WITH DIABETIC AUTONOMIC NEUROPATHY, WITH LONG-TERM CURRENT USE OF INSULIN (MULTI): ICD-10-CM

## 2024-04-02 DIAGNOSIS — E11.43 TYPE 2 DIABETES MELLITUS WITH DIABETIC AUTONOMIC NEUROPATHY, WITH LONG-TERM CURRENT USE OF INSULIN (MULTI): Primary | ICD-10-CM

## 2024-04-02 PROCEDURE — 99214 OFFICE O/P EST MOD 30 MIN: CPT | Performed by: INTERNAL MEDICINE

## 2024-04-02 PROCEDURE — 1036F TOBACCO NON-USER: CPT | Performed by: INTERNAL MEDICINE

## 2024-04-02 PROCEDURE — 3008F BODY MASS INDEX DOCD: CPT | Performed by: INTERNAL MEDICINE

## 2024-04-02 RX ORDER — GLUCAGON 3 MG/1
POWDER NASAL
Qty: 1 EACH | Refills: 3 | Status: SHIPPED | OUTPATIENT
Start: 2024-04-02

## 2024-04-02 RX ORDER — NALTREXONE HYDROCHLORIDE AND BUPROPION HYDROCHLORIDE 8; 90 MG/1; MG/1
TABLET, EXTENDED RELEASE ORAL
Qty: 120 TABLET | Refills: 11 | Status: SHIPPED | OUTPATIENT
Start: 2024-04-02 | End: 2024-04-04 | Stop reason: WASHOUT

## 2024-04-02 NOTE — PROGRESS NOTES
Consults    Reason For Consult  Diabetes and adrenal insufficiency     History Of Present Illness  Jonathan Ayala is a 48 y.o. female presenting with diabetes .       she has trush multiple time  advair also is on board      her BG are improved  1 to 4 carb        type 2 diabetes on insulin therapy    Initial diagnosis with diabetes was 2003. The patient has a family history of type 2 including father  She had comorbid conditions including obesity with BMI 46, hyperlipidemia, Vit D deficiency       Currently 44 units toujeo, we will change 50 units      recently he rcortiosl was low  she was having abdominal pain as well  her records reviewed      lAT ct BRAIN 2/2022, SHOWED INFARCT IN FRONTAL LOBE BOTH SIDES.  she has new onset adrenal insufficiency   recent d/c from hospital with nausea and vomiting      ERCP and MRI pancreas done   sees neurology at [facility]  and also she sees migraine      nausea  requires medication   recent BG : in am since ozempic, / trulicity , high 80's, 140   dinner 170     A1c 7.8% 2/2023     Current diabetes regimen is as follows:   Lantus 36 units in am, 30 at night  Humalog 1 unit per 3 gram, 1 unit per 12 mg/dl     The patient is currently checking the blood glucose using Dexcom CGM.      Hypoglycemia frequency: 1%  Hypoglycemia awareness: yes     The patient comes into the office today stating she was told by Dr LEMUS to m=come in due to her lips getting numb at times, she is fatigued, and she is having muscle cramping. B 12 2/2023 WNL at 299.      Last foot exam: 3/2024  Last eye exam: Dr Mederos, 10/2023  Last urine albumin: <7 2024  Last LDL:108 2021, labs due.      She was evaluated for seizure disorder of March 14-20. States she has epileptic and nonepileptic seizures, has follow up in 3 months, no medication changes were made.      She was started on methylprednisone 6 mg taper in 1 mg doses starting 5 weeks ago. She is currently taking 1 mg due to cough from pulmonology MD.             All pertinent positive symptoms are included in the history of present illness.     All other systems have been reviewed and are negative and noncontributory to this patient's current ailments.               Home Management    Results from Most Recent A1C  Hemoglobin A1C   Date/Time Value Ref Range Status   11/08/2023 09:38 AM 5.9 (H) 4.3 - 5.6 % Final     Comment:     American Diabetes Association guidelines indicate that patients with HgbA1c in the range 5.7-6.4% are at increased risk for development of diabetes, and intervention by lifestyle modification may be beneficial. HgbA1c greater or equal to 6.5% is considered diagnostic of diabetes.        Diabetes Problem List Entries with Dates  Problem List:  2023-07: Type 2 diabetes mellitus with diabetic autonomic neuropathy,   with long-term current use of insulin (CMS/[facility])  2020-08: Diabetic gastroparesis associated with type 2 diabetes   mellitus (CMS/[facility])      History of DKA with Dates:   This is not able to automated, and will cause the clinician to review the entire history of patient. Solved, need to look at History of Diabetes Problem List. Includes resolved entries. Clinician can look above and enter the count.      History where DKA was Reason for Admission in last year no        NOTE: Anything under this line is for testing purposes only       Any family history of thyroid cancer? no  Any personal history of radiation to your head/neck? no    Past Medical History  She has a past medical history of Abnormal findings on diagnostic imaging of other abdominal regions, including retroperitoneum (10/14/2020), Acquired deformity of nose (03/24/2022), Acute upper respiratory infection, unspecified (10/16/2019), Allergy status to unspecified drugs, medicaments and biological substances (05/22/2020), Allergy status to unspecified drugs, medicaments and biological substances (11/13/2020), Benign intracranial hypertension (01/27/2022), Bipolar disorder, unspecified  (CMS/[facility]), Breast calcification, right (08/21/2018), Cellulitis of abdominal wall (09/28/2022), Cervicalgia (07/01/2020), Chronic maxillary sinusitis (01/04/2022), Chronic sialoadenitis (03/16/2020), COVID-19 (01/06/2022), Decreased white blood cell count, unspecified (11/04/2019), Disturbances of salivary secretion (03/16/2020), Dry eye syndrome of bilateral lacrimal glands (10/07/2022), Encounter for preprocedural cardiovascular examination (02/01/2022), Food additives allergy status (06/11/2020), Fracture of nasal bones, initial encounter for closed fracture (03/03/2022), Granuloma of right orbit (10/07/2021), History of endometrial ablation (11/09/2017), Localized swelling, mass and lump, head (03/24/2022), Major depressive disorder, recurrent, in full remission (CMS/[facility]) (10/07/2021), 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), Nipple discharge (08/24/2022), Ocular pain, right eye (10/07/2022), Other abnormal and inconclusive findings on diagnostic imaging of breast (07/06/2020), Other anomalies of pupillary function (05/31/2019), Other chest pain (05/18/2020), Other conditions influencing health status (08/01/2022), Other conditions influencing health status (08/03/2021), Other specified disorders of eustachian tube, left ear (11/18/2019), Other specified disorders of nose and nasal sinuses (03/24/2022), Other specified disorders of nose and nasal sinuses (03/24/2022), Pelvic and perineal pain (07/06/2020), Personal history of other diseases of the circulatory system (04/07/2020), Personal history of other diseases of the circulatory system (04/07/2020), Personal history of other diseases of the circulatory system, Personal history of other diseases of the digestive system, Personal history of other diseases of the digestive system (03/02/2020), Personal history of other diseases of the musculoskeletal system and connective tissue (01/19/2022), Personal history of other diseases of the musculoskeletal  system and connective tissue (03/02/2021), Personal history of other diseases of the musculoskeletal system and connective tissue (06/16/2020), Personal history of other diseases of the nervous system and sense organs (11/18/2019), Personal history of other diseases of the nervous system and sense organs (09/21/2022), Personal history of other diseases of the respiratory system (04/14/2021), Personal history of other endocrine, nutritional and metabolic disease (02/17/2021), Personal history of other mental and behavioral disorders (05/27/2021), Personal history of other specified conditions (09/07/2022), Personal history of other specified conditions (10/16/2019), Personal history of other specified conditions (09/28/2022), Personal history of other specified conditions (09/16/2021), Personal history of other specified conditions (02/01/2022), Personal history of other specified conditions (03/09/2022), Personal history of other specified conditions (02/12/2014), Personal history of other specified conditions (10/27/2021), Personal history of other specified conditions (10/16/2019), Personal history of other specified conditions (02/26/2021), Personal history of other specified conditions (02/22/2021), Personal history of urinary calculi, Postural orthostatic tachycardia syndrome (POTS), Rash and other nonspecific skin eruption (03/15/2022), Repeated falls (06/23/2021), Right lower quadrant pain (10/14/2020), Sjogren syndrome, unspecified (CMS/HCC), Slow transit constipation (07/09/2020), Subarachnoid hemorrhage, traumatic (CMS/HCC) (04/19/2023), Traumatic subarachnoid hemorrhage with loss of consciousness of unspecified duration, subsequent encounter (03/15/2022), Traumatic subarachnoid hemorrhage without loss of consciousness, subsequent encounter, Unspecified disorder of refraction (10/07/2022), Unspecified optic neuritis (11/06/2020), Unspecified optic neuritis (11/06/2020), Unspecified visual loss  (09/25/2019), Venous insufficiency (chronic) (peripheral) (10/18/2021), Viral infection, unspecified (01/11/2022), and Vitamin D deficiency, unspecified (09/28/2022).    Surgical History  She has a past surgical history that includes Other surgical history (08/22/2019); Other surgical history (08/22/2019); Other surgical history (08/22/2019); Other surgical history (08/22/2019); Other surgical history (08/22/2019); Other surgical history (08/22/2019); MR angio neck wo IV contrast (2/8/2021); and MR angio head wo IV contrast (2/8/2021).     Social History  She reports that she has never smoked. She has never used smokeless tobacco. She reports that she does not currently use alcohol. She reports current drug use. Drug: Benzodiazepines.    Family History  Family History   Problem Relation Name Age of Onset    Other (Perforated bowel) Mother      Hyperlipidemia Father      Hypertension Father      Heart attack Father      Other (S/P CABG) Father      Stroke Sister      Breast cancer Sister      Lupus Sister      Hyperlipidemia Brother      Hypertension Brother      Blindness Other Multiple     Melanoma Other      Asthma Other      Other (hay fever) Other      Allergies Other          Allergies  Acetazolamide, Atorvastatin, Cefdinir, Ceftriaxone, Doxepin, Duloxetine, Levofloxacin, Levofloxacin in d5w, Nutritional supplements, Prochlorperazine, Red dye, Rosemary, Rosemary oil, Strawberry, Sulfa (sulfonamide antibiotics), Topiramate, Tree pollen-black walnut, Tree pollen-pecan, Jfrisqhr-6-uh7 antimigraine agents, [place], Aripiprazole, Aspartame, Aspartame (bulk), Fenofibrate, Gluten, Hydrochlorothiazide, Hydromorphone, Iron, Meclizine, Metformin, Propoxyphene, Statins-hmg-coa reductase inhibitors, Tetracyclines, Thiazides, Venlafaxine, Wheat, Adhesive tape-silicones, Barbiturates, Dhe, Diet foods, Ferrous sulfate, Nsaids (non-steroidal anti-inflammatory drug), Other, Sulfonylureas, Tobramycin, Vancomycin, Adhesive,  Azithromycin, Betamethasone, House dust, Metoclopramide hcl, Milk, Prednisone, Propoxyphene-acetaminophen, Sulfacetamide sodium, and Zolpidem    Review of Systems    Past Medical History:   Diagnosis Date    Abnormal findings on diagnostic imaging of other abdominal regions, including retroperitoneum 10/14/2020    Abnormal CT of the abdomen    Acquired deformity of nose 03/24/2022    Nasal deformity    Acute upper respiratory infection, unspecified 10/16/2019    Acute URI    Allergy status to unspecified drugs, medicaments and biological substances 05/22/2020    History of drug allergy    Allergy status to unspecified drugs, medicaments and biological substances 11/13/2020    History of adverse drug reaction    Benign intracranial hypertension 01/27/2022    Pseudotumor cerebri    Bipolar disorder, unspecified (CMS/HCC)     Bipolar disease, chronic    Breast calcification, right 08/21/2018    Cellulitis of abdominal wall 09/28/2022    Cellulitis of right abdominal wall    Cervicalgia 07/01/2020    Cervicalgia of mgwwzyju-zbuozlw-irwfu region    Chronic maxillary sinusitis 01/04/2022    Chronic maxillary sinusitis    Chronic sialoadenitis 03/16/2020    Chronic sialoadenitis    COVID-19 01/06/2022    COVID-19 with multiple comorbidities    Decreased white blood cell count, unspecified 11/04/2019    Leukopenia    Disturbances of salivary secretion 03/16/2020    Xerostomia    Dry eye syndrome of bilateral lacrimal glands 10/07/2022    Dry eyes, bilateral    Encounter for preprocedural cardiovascular examination 02/01/2022    Preoperative cardiovascular examination    Food additives allergy status 06/11/2020    Allergy to food dye    Fracture of nasal bones, initial encounter for closed fracture 03/03/2022    Closed fracture of nasal bone, initial encounter    Granuloma of right orbit 10/07/2021    Inflammatory pseudotumor of right orbit    History of endometrial ablation 11/09/2017    Localized swelling, mass and lump,  head 03/24/2022    Swollen nose    Major depressive disorder, recurrent, in full remission (CMS/HCC) 10/07/2021    Depression, major, recurrent, in complete remission    Mammary duct ectasia of left breast 08/24/2022    Periductal mastitis of left breast    Nipple discharge 08/24/2022    Bloody discharge from left nipple    Ocular pain, right eye 10/07/2022    Pain in right eye    Other abnormal and inconclusive findings on diagnostic imaging of breast 07/06/2020    Other abnormal and inconclusive findings on diagnostic imaging of breast    Other anomalies of pupillary function 05/31/2019    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    Other chest pain 05/18/2020    Chest discomfort    Other conditions influencing health status 08/01/2022    History of cough    Other conditions influencing health status 08/03/2021    Chronic migraine    Other specified disorders of eustachian tube, left ear 11/18/2019    ETD (Eustachian tube dysfunction), left    Other specified disorders of nose and nasal sinuses 03/24/2022    Nasal dryness    Other specified disorders of nose and nasal sinuses 03/24/2022    Nasal crusting    Pelvic and perineal pain 07/06/2020    Pelvic pain    Personal history of other diseases of the circulatory system 04/07/2020    History of sinus tachycardia    Personal history of other diseases of the circulatory system 04/07/2020    History of abnormal electrocardiography    Personal history of other diseases of the circulatory system     History of Raynaud's syndrome    Personal history of other diseases of the digestive system     History of irritable bowel syndrome    Personal history of other diseases of the digestive system 03/02/2020    History of oral pain    Personal history of other diseases of the musculoskeletal system and connective tissue 01/19/2022    History of neck pain    Personal history of other diseases of the musculoskeletal system and connective tissue 03/02/2021    History of  scleroderma    Personal history of other diseases of the musculoskeletal system and connective tissue 06/16/2020    History of muscle weakness    Personal history of other diseases of the nervous system and sense organs 11/18/2019    History of hearing loss    Personal history of other diseases of the nervous system and sense organs 09/21/2022    History of partial seizures    Personal history of other diseases of the respiratory system 04/14/2021    History of asthma    Personal history of other endocrine, nutritional and metabolic disease 02/17/2021    History of diabetes mellitus    Personal history of other mental and behavioral disorders 05/27/2021    History of anxiety    Personal history of other specified conditions 09/07/2022    History of nipple discharge    Personal history of other specified conditions 10/16/2019    History of headache    Personal history of other specified conditions 09/28/2022    History of lump of left breast    Personal history of other specified conditions 09/16/2021    History of persistent cough    Personal history of other specified conditions 02/01/2022    History of palpitations    Personal history of other specified conditions 03/09/2022    History of headache    Personal history of other specified conditions 02/12/2014    History of chest pain    Personal history of other specified conditions 10/27/2021    History of nausea and vomiting    Personal history of other specified conditions 10/16/2019    History of fatigue    Personal history of other specified conditions 02/26/2021    History of orthopnea    Personal history of other specified conditions 02/22/2021    History of shortness of breath    Personal history of urinary calculi     H/O renal calculi    Postural orthostatic tachycardia syndrome (POTS)     POTS (postural orthostatic tachycardia syndrome)    Rash and other nonspecific skin eruption 03/15/2022    Rash    Repeated falls 06/23/2021    Recurrent falls    Right  lower quadrant pain 10/14/2020    Abdominal pain, RLQ (right lower quadrant)    Sjogren syndrome, unspecified (CMS/[facility])     History of Sjogren's disease    Slow transit constipation 07/09/2020    Slow transit constipation    Subarachnoid hemorrhage, traumatic (CMS/[facility]) 04/19/2023    Traumatic subarachnoid hemorrhage with loss of consciousness of unspecified duration, subsequent encounter 03/15/2022    Subarachnoid hemorrhage following injury, with loss of consciousness, subsequent encounter    Traumatic subarachnoid hemorrhage without loss of consciousness, subsequent encounter     Subarachnoid hemorrhage following injury, no loss of consciousness, subsequent encounter    Unspecified disorder of refraction 10/07/2022    Refractive error    Unspecified optic neuritis 11/06/2020    Right optic neuritis    Unspecified optic neuritis 11/06/2020    Optic neuritis, right    Unspecified visual loss 09/25/2019    Vision loss    Venous insufficiency (chronic) (peripheral) 10/18/2021    Chronic venous insufficiency of lower extremity    Viral infection, unspecified 01/11/2022    Nonspecific syndrome suggestive of viral illness    Vitamin D deficiency, unspecified 09/28/2022    Vitamin D deficiency       Past Surgical History:   Procedure Laterality Date    MR HEAD ANGIO WO IV CONTRAST  2/8/2021    MR HEAD ANGIO WO IV CONTRAST 2/8/2021 [facility]    MR NECK ANGIO WO IV CONTRAST  2/8/2021    MR NECK ANGIO WO IV CONTRAST 2/8/2021 [facility] CLINICAL [facility]GAC[facility]    OTHER SURGICAL HISTORY  08/22/2019    Carpal tunnel surgery    OTHER SURGICAL HISTORY  08/22/2019    Hysterectomy    OTHER SURGICAL HISTORY  08/22/2019    Venous access port placement    OTHER SURGICAL HISTORY  08/22/2019    Cholecystectomy    OTHER SURGICAL HISTORY  08/22/2019    Appendectomy    OTHER SURGICAL HISTORY  08/22/2019    Pyloroplasty       Social History     Socioeconomic History    Marital status:      Spouse name: Not on file    Number of children:  Not on file    Years of education: Not on file    Highest education level: Not on file   Occupational History    Not on file   Tobacco Use    Smoking status: Never    Smokeless tobacco: Never   Vaping Use    Vaping Use: Never used   Substance and Sexual Activity    Alcohol use: Not Currently    Drug use: Yes     Types: Benzodiazepines    Sexual activity: Yes   Other Topics Concern    Not on file   Social History Narrative    Not on file     Social Determinants of Health     Financial Resource Strain: Not on file   Food Insecurity: Not on file   Transportation Needs: Not on file   Physical Activity: Not on file   Stress: Not on file   Social Connections: Not on file   Intimate Partner Violence: Not on file   Housing Stability: Not on file        Physical Exam     ROS, PMH, FH/SH, surgical history and allergies have been reviewed.    Last Recorded Vitals  There were no vitals taken for this visit.    Relevant Results         If you would like to pull in Medications, type .meds     If you would like to pull in Lab results for the last 24 hours, type .[id number]    If you would like to pull in specific Lab results, type .ll     If you would like to pull in Imaging results, type .imgrslt :99}  Lab Review  Lab Results   Component Value Date    BILITOT 0.3 02/22/2024    CALCIUM 9.0 02/22/2024    CO2 28 02/22/2024     02/22/2024    CREATININE 0.66 02/22/2024    GLUCOSE 162 (H) 02/22/2024    ALKPHOS 58 02/22/2024    K 3.7 02/22/2024    PROT 7.8 02/22/2024     02/22/2024    AST 21 02/22/2024    ALT 34 02/22/2024    BUN 24 (H) 02/22/2024    ANIONGAP 12 02/22/2024    MG 1.77 02/06/2024    PHOS 3.8 04/30/2023    LDH 95 07/17/2020    ALBUMIN 3.8 02/22/2024    LIPASE 83 (H) 02/22/2024    GFRF 89 08/06/2023    GFRMALE CANCELED 04/30/2023     Lab Results   Component Value Date    TRIG CANCELED 02/23/2023    CHOL CANCELED 02/23/2023    HDL CANCELED 02/23/2023     Lab Results   Component Value Date    HGBA1C 5.9 (H)  11/08/2023    HGBA1C 7.8 (A) 02/23/2023    HGBA1C 8.3 (A) 02/16/2022     The ASCVD Risk score (Mamie SANDERSON, et al., 2019) failed to calculate for the following reasons:    The patient has a prior MI or stroke diagnosis       Assessment/Plan   Problem List Items Addressed This Visit             ICD-10-CM    Thyroid nodule E04.1    Relevant Medications    naltrexone-bupropion (Contrave) 8-90 mg ER tablet    Other Relevant Orders    Lipid Panel    Hemoglobin A1C    Type 2 diabetes mellitus with diabetic autonomic neuropathy, with long-term current use of insulin (CMS/[facility]) - Primary E11.43, Z79.4    Relevant Medications    naltrexone-bupropion (Contrave) 8-90 mg ER tablet    Other Relevant Orders    Lipid Panel    Hemoglobin A1C       diabetes     Adrenal insufficiency (255.41) (E27.40)   · Dyslipidemia, goal LDL below 70 (272.4) (E78.5)   · Type 2 diabetes mellitus with hyperglycemia, with long-term current use of insulin  (250.00,790.29,V58.67) (E11.65,Z79.4)   · Thyroid nodule (241.0) (E04.1)   · Hypothyroidism (244.9) (E03.9)    secondary insufficiency , previous history of frontal lobe infarct  HC current;y on 10 mg twice a day , she will do 10-5 due to frequent yeast infection        H A1C not at goal at 7.8%, GMI 7.1%     her pump trial failed   Change diabetes regimen is as follows:   toujeo 66 units in am units AM      Continue  Humalog 1 unit per 4 gram, 1 unit per 30 mg/dl  Farxiga 10 mg daily      continue ozempic 1 mg weekly, the pain was present before the drug   ophthalmology 9/2023   has carpal tunnel and sees neurology for neuropathy and migraine      hypothyroid treated , as she is throwing up  TSH 3/2023   we will check blood tests , orders placed      I spent a total of 30+ minutes on the date of the service which included preparing to see the patient, face-to-face patient care, completing clinical documentation, obtaining and / or reviewing separately obtained history, counseling and educating the  patient/family/caregiver, ordering medications, tests, or procedures, communicating with other healthcare providers (not separately reported), independently interpreting results, not separately reported, and communicating results to the patient/family/caregiver, real-time telemedicine visit using audiovisual technology with patient's verbal consent. Name and date of birth verified.     Currently 44 units toujeo, we will change 50 units     Marah Felix MD

## 2024-04-03 ENCOUNTER — APPOINTMENT (OUTPATIENT)
Dept: OPHTHALMOLOGY | Facility: CLINIC | Age: 49
End: 2024-04-03
Payer: MEDICAID

## 2024-04-03 PROCEDURE — RXMED WILLOW AMBULATORY MEDICATION CHARGE

## 2024-04-04 ENCOUNTER — TELEMEDICINE (OUTPATIENT)
Dept: PRIMARY CARE | Facility: CLINIC | Age: 49
End: 2024-04-04
Payer: MEDICAID

## 2024-04-04 ENCOUNTER — APPOINTMENT (OUTPATIENT)
Dept: PRIMARY CARE | Facility: CLINIC | Age: 49
End: 2024-04-04
Payer: MEDICAID

## 2024-04-04 VITALS
HEIGHT: 62 IN | WEIGHT: 253 LBS | HEART RATE: 101 BPM | BODY MASS INDEX: 46.56 KG/M2 | DIASTOLIC BLOOD PRESSURE: 76 MMHG | SYSTOLIC BLOOD PRESSURE: 158 MMHG

## 2024-04-04 DIAGNOSIS — K21.00 GASTROESOPHAGEAL REFLUX DISEASE WITH ESOPHAGITIS WITHOUT HEMORRHAGE: ICD-10-CM

## 2024-04-04 DIAGNOSIS — E11.43 DIABETIC GASTROPARESIS ASSOCIATED WITH TYPE 2 DIABETES MELLITUS (MULTI): Primary | ICD-10-CM

## 2024-04-04 DIAGNOSIS — M35.01 SJOGREN'S SYNDROME WITH KERATOCONJUNCTIVITIS SICCA (MULTI): ICD-10-CM

## 2024-04-04 DIAGNOSIS — Z79.4 TYPE 2 DIABETES MELLITUS WITH DIABETIC AUTONOMIC NEUROPATHY, WITH LONG-TERM CURRENT USE OF INSULIN (MULTI): ICD-10-CM

## 2024-04-04 DIAGNOSIS — I10 BENIGN ESSENTIAL HYPERTENSION: ICD-10-CM

## 2024-04-04 DIAGNOSIS — G93.2 BENIGN INTRACRANIAL HYPERTENSION: ICD-10-CM

## 2024-04-04 DIAGNOSIS — K31.84 DIABETIC GASTROPARESIS ASSOCIATED WITH TYPE 2 DIABETES MELLITUS (MULTI): Primary | ICD-10-CM

## 2024-04-04 DIAGNOSIS — E66.01 CLASS 3 SEVERE OBESITY DUE TO EXCESS CALORIES WITH SERIOUS COMORBIDITY AND BODY MASS INDEX (BMI) OF 40.0 TO 44.9 IN ADULT (MULTI): ICD-10-CM

## 2024-04-04 DIAGNOSIS — E11.43 TYPE 2 DIABETES MELLITUS WITH DIABETIC AUTONOMIC NEUROPATHY, WITH LONG-TERM CURRENT USE OF INSULIN (MULTI): ICD-10-CM

## 2024-04-04 DIAGNOSIS — J45.40 MODERATE PERSISTENT ALLERGIC ASTHMA (HHS-HCC): ICD-10-CM

## 2024-04-04 DIAGNOSIS — D83.9 CVID (COMMON VARIABLE IMMUNODEFICIENCY) (MULTI): ICD-10-CM

## 2024-04-04 PROCEDURE — 1036F TOBACCO NON-USER: CPT | Performed by: INTERNAL MEDICINE

## 2024-04-04 PROCEDURE — 99213 OFFICE O/P EST LOW 20 MIN: CPT | Performed by: INTERNAL MEDICINE

## 2024-04-04 PROCEDURE — 3077F SYST BP >= 140 MM HG: CPT | Performed by: INTERNAL MEDICINE

## 2024-04-04 PROCEDURE — 3078F DIAST BP <80 MM HG: CPT | Performed by: INTERNAL MEDICINE

## 2024-04-04 PROCEDURE — 3008F BODY MASS INDEX DOCD: CPT | Performed by: INTERNAL MEDICINE

## 2024-04-04 RX ORDER — PROPRANOLOL HYDROCHLORIDE 10 MG/1
10 TABLET ORAL 3 TIMES DAILY
Qty: 90 TABLET | Refills: 11 | Status: SHIPPED | OUTPATIENT
Start: 2024-04-04

## 2024-04-04 RX ORDER — DAPAGLIFLOZIN 5 MG/1
5 TABLET, FILM COATED ORAL 2 TIMES DAILY
Qty: 90 TABLET | Refills: 3 | Status: SHIPPED | COMMUNITY
Start: 2024-04-04 | End: 2024-04-26 | Stop reason: SDUPTHER

## 2024-04-04 ASSESSMENT — ENCOUNTER SYMPTOMS
CHILLS: 1
HEADACHES: 1
COUGH: 1

## 2024-04-04 NOTE — ASSESSMENT & PLAN NOTE
Blood pressure is elevated but recently getting over pneumonia with Avelox continue to monitor continue with metoprolol to tartrate 50 mg twice a day refilled propranolol

## 2024-04-04 NOTE — ASSESSMENT & PLAN NOTE
Manage continue with endocrinologist at this time taken off GLP-1 agonist due to its exacerbation of gastroparesis diabetic

## 2024-04-04 NOTE — PROGRESS NOTES
"Subjective   Patient ID: Jonathan Ayala is a 48 y.o. female who presents for virtual follow up  and Cough (Fever 101.1, patient states she had pneumonia after he hernia surgery was given antibiotic felt better once stopped she started with the cough again, SOB with walking, COVID neg, when she brings stuff up it is real thick and green.   ).    HPI     Review of Systems    Objective   /76   Pulse 101   Ht 1.575 m (5' 2\")   Wt 115 kg (253 lb)   BMI 46.27 kg/m²     Physical Exam    Assessment/Plan          "

## 2024-04-04 NOTE — PATIENT INSTRUCTIONS

## 2024-04-04 NOTE — PROGRESS NOTES
"Subjective   Reason for Visit: Jonathan Ayala is an 48 y.o. female here for a virtual visit. Virtual or Telephone Consent    An interactive audio and video telecommunication system which permits real time communications between the patient (at the originating site) and provider (at the distant site) was utilized to provide this telehealth service.   Verbal consent was requested and obtained from Jonathan Ayala on this date, 04/04/24 for a telehealth visit.     Past Medical, Surgical, and Family History reviewed and updated in chart.Time spent 33 minutes    Reviewed all medications by prescribing practitioner or clinical pharmacist (such as prescriptions, OTCs, herbal therapies and supplements) and documented in the medical record.    Cough  This is a recurrent problem. The current episode started today. The problem has been waxing and waning. The problem occurs constantly. The cough is Non-productive. Associated symptoms include chest pain, chills, headaches and nasal congestion. Nothing aggravates the symptoms. She has tried oral steroids, a beta-agonist inhaler, OTC cough suppressant, OTC inhaler, body position changes, cool air, prescription cough suppressant, rest, steroid inhaler, ipratropium inhaler and leukotriene antagonists for the symptoms. The treatment provided mild relief. Her past medical history is significant for asthma, bronchiectasis and pneumonia.       Patient Care Team:  Isidoro Mensah DO as PCP - General  RAUL Retana-CNP as PCP - [facility] PCP     Review of Systems   Constitutional:  Positive for chills.   Respiratory:  Positive for cough.    Cardiovascular:  Positive for chest pain.   Neurological:  Positive for headaches.   All other systems reviewed and are negative.      Objective   Vitals:  /76   Pulse 101   Ht 1.575 m (5' 2\")   Wt 115 kg (253 lb)   BMI 46.27 kg/m²       Physical Exam  Vitals and nursing note reviewed.   Constitutional:       General: She is not " in acute distress.     Appearance: Normal appearance. She is well-developed. She is obese. She is not toxic-appearing.   HENT:      Head: Normocephalic and atraumatic.      Right Ear: Tympanic membrane and external ear normal.      Left Ear: Tympanic membrane and external ear normal.      Nose: Nose normal.      Mouth/Throat:      Mouth: Mucous membranes are moist.      Pharynx: Oropharynx is clear. No oropharyngeal exudate or posterior oropharyngeal erythema.      Tonsils: No tonsillar exudate. 2+ on the right. 2+ on the left.   Eyes:      Extraocular Movements: Extraocular movements intact.      Conjunctiva/sclera: Conjunctivae normal.   Cardiovascular:      Rate and Rhythm: Normal rate and regular rhythm.      Pulses: Normal pulses.      Heart sounds: Normal heart sounds. No murmur heard.  Pulmonary:      Effort: Pulmonary effort is normal.      Breath sounds: Normal breath sounds.   Abdominal:      General: Abdomen is flat. Bowel sounds are normal.      Palpations: Abdomen is soft.   Musculoskeletal:      Cervical back: Neck supple.   Lymphadenopathy:      Cervical: No cervical adenopathy.   Skin:     General: Skin is warm and dry.      Findings: No rash.   Neurological:      Mental Status: She is alert. Mental status is at baseline.   Psychiatric:         Mood and Affect: Mood normal.         Behavior: Behavior normal.         Thought Content: Thought content normal.         Judgment: Judgment normal.         Assessment/Plan   Problem List Items Addressed This Visit       CVID (common variable immunodeficiency) (CMS/HCC)    Overview     Last Assessment & Plan: Formatting of this note might be different from the original. Intravenous immunoglobulin G as directed by hematologist         Current Assessment & Plan     Continue to monitor immunoglobin levels as directed by hematologist stable at this time         Benign essential hypertension    Overview     Formatting of this note might be different from the  original. hemodynamically stable, continue home meds Last Assessment & Plan: Assessment: No acute issues, stable PLAN: · Medical management with metoprolol tartrate 50 mg twice a day Last Assessment & Plan: Formatting of this note might be different from the original. Continue monitoring blood pressures and continue with her propranolol         Current Assessment & Plan     Blood pressure is elevated but recently getting over pneumonia with Avelox continue to monitor continue with metoprolol to tartrate 50 mg twice a day refilled propranolol         Relevant Medications    propranolol (Inderal) 10 mg tablet    Benign intracranial hypertension    Relevant Medications    propranolol (Inderal) 10 mg tablet    Other Relevant Orders    Follow Up In [facility] - PCP - Established    Diabetic gastroparesis associated with type 2 diabetes mellitus (CMS/[facility]) - Primary    Overview     Last Assessment & Plan: Formatting of this note might be different from the original. Refer to GI, find out when last gastric emptying time was done         Current Assessment & Plan     Manage continue with endocrinologist at this time taken off GLP-1 agonist due to its exacerbation of gastroparesis diabetic         Moderate persistent allergic asthma    Class 3 severe obesity due to excess calories with serious comorbidity and body mass index (BMI) of 40.0 to 44.9 in adult (CMS/[facility])    Current Assessment & Plan     BMI elevated at 46 attempt to reduce weight once symptoms status improves         Sjogren's syndrome with keratoconjunctivitis sicca (CMS/[facility])    Current Assessment & Plan     Continue preventative care with hydrating solutions         Type 2 diabetes mellitus with diabetic autonomic neuropathy, with long-term current use of insulin (CMS/[facility])    Current Assessment & Plan     Following with endocrine specialist at this time titrating on Farxiga 10 mg daily         GERD with esophagitis        Patient was identified as a  fall risk. Risk prevention instructions provided.

## 2024-04-08 PROCEDURE — RXMED WILLOW AMBULATORY MEDICATION CHARGE

## 2024-04-11 ENCOUNTER — PHARMACY VISIT (OUTPATIENT)
Dept: PHARMACY | Facility: CLINIC | Age: 49
End: 2024-04-11
Payer: MEDICAID

## 2024-04-12 ENCOUNTER — APPOINTMENT (OUTPATIENT)
Dept: OTOLARYNGOLOGY | Facility: CLINIC | Age: 49
End: 2024-04-12
Payer: MEDICAID

## 2024-04-15 ENCOUNTER — APPOINTMENT (OUTPATIENT)
Dept: CT IMAGING | Age: 49
End: 2024-04-15
Payer: MEDICAID

## 2024-04-15 ENCOUNTER — PATIENT MESSAGE (OUTPATIENT)
Dept: ENDOCRINOLOGY | Facility: CLINIC | Age: 49
End: 2024-04-15
Payer: MEDICAID

## 2024-04-15 ENCOUNTER — HOSPITAL ENCOUNTER (EMERGENCY)
Age: 49
Discharge: HOME | End: 2024-04-15
Attending: EMERGENCY MEDICINE
Payer: MEDICAID

## 2024-04-15 VITALS
DIASTOLIC BLOOD PRESSURE: 83 MMHG | OXYGEN SATURATION: 96 % | RESPIRATION RATE: 18 BRPM | TEMPERATURE: 98.1 F | SYSTOLIC BLOOD PRESSURE: 146 MMHG | HEART RATE: 99 BPM

## 2024-04-15 DIAGNOSIS — R10.9 ABDOMINAL PAIN, UNSPECIFIED ABDOMINAL LOCATION: Primary | ICD-10-CM

## 2024-04-15 LAB
ALBUMIN SERPL-MCNC: 3.8 G/DL (ref 3.5–5.2)
ALP SERPL-CCNC: 90 U/L (ref 35–104)
ALT SERPL-CCNC: 27 U/L (ref 0–32)
AMYLASE SERPL-CCNC: 37 U/L (ref 20–100)
ANION GAP SERPL CALCULATED.3IONS-SCNC: 12 MMOL/L (ref 7–16)
AST SERPL-CCNC: 25 U/L (ref 0–31)
BILIRUB SERPL-MCNC: <0.2 MG/DL (ref 0–1.2)
BILIRUB UR QL STRIP: NEGATIVE
BUN SERPL-MCNC: 18 MG/DL (ref 6–20)
CALCIUM SERPL-MCNC: 9.4 MG/DL (ref 8.6–10.2)
CHLORIDE SERPL-SCNC: 104 MMOL/L (ref 98–107)
CLARITY UR: CLEAR
CO2 SERPL-SCNC: 24 MMOL/L (ref 22–29)
COLOR UR: YELLOW
CREAT SERPL-MCNC: 0.7 MG/DL (ref 0.5–1)
ERYTHROCYTE [DISTWIDTH] IN BLOOD BY AUTOMATED COUNT: 15.4 % (ref 11.5–15)
GFR SERPL CREATININE-BSD FRML MDRD: >90 ML/MIN/1.73M2
GLUCOSE SERPL-MCNC: 186 MG/DL (ref 74–99)
GLUCOSE UR STRIP-MCNC: >=1000 MG/DL
HCG UR QL: NEGATIVE
HCT VFR BLD AUTO: 38.5 % (ref 34–48)
HGB BLD-MCNC: 12.6 G/DL (ref 11.5–15.5)
HGB UR QL STRIP.AUTO: NEGATIVE
KETONES UR STRIP-MCNC: 15 MG/DL
LACTATE BLDV-SCNC: 1.7 MMOL/L (ref 0.5–2.2)
LEUKOCYTE ESTERASE UR QL STRIP: NEGATIVE
LIPASE SERPL-CCNC: 78 U/L (ref 13–60)
MAGNESIUM SERPL-MCNC: 2 MG/DL (ref 1.6–2.6)
MCH RBC QN AUTO: 29.5 PG (ref 26–35)
MCHC RBC AUTO-ENTMCNC: 32.7 G/DL (ref 32–34.5)
MCV RBC AUTO: 90.2 FL (ref 80–99.9)
NITRITE UR QL STRIP: NEGATIVE
PH UR STRIP: 6.5 [PH] (ref 5–9)
PLATELET # BLD AUTO: 271 K/UL (ref 130–450)
PMV BLD AUTO: 10.2 FL (ref 7–12)
POTASSIUM SERPL-SCNC: 4 MMOL/L (ref 3.5–5)
PROT SERPL-MCNC: 7.6 G/DL (ref 6.4–8.3)
PROT UR STRIP-MCNC: NEGATIVE MG/DL
RBC # BLD AUTO: 4.27 M/UL (ref 3.5–5.5)
RBC #/AREA URNS HPF: ABNORMAL /HPF
SODIUM SERPL-SCNC: 140 MMOL/L (ref 132–146)
SP GR UR STRIP: 1.02 (ref 1–1.03)
UROBILINOGEN UR STRIP-ACNC: 0.2 EU/DL (ref 0–1)
WBC #/AREA URNS HPF: ABNORMAL /HPF
WBC OTHER # BLD: 3.4 K/UL (ref 4.5–11.5)

## 2024-04-15 PROCEDURE — 6360000002 HC RX W HCPCS: Performed by: EMERGENCY MEDICINE

## 2024-04-15 PROCEDURE — 2580000003 HC RX 258: Performed by: EMERGENCY MEDICINE

## 2024-04-15 PROCEDURE — 83690 ASSAY OF LIPASE: CPT

## 2024-04-15 PROCEDURE — 96375 TX/PRO/DX INJ NEW DRUG ADDON: CPT

## 2024-04-15 PROCEDURE — 80053 COMPREHEN METABOLIC PANEL: CPT

## 2024-04-15 PROCEDURE — 96374 THER/PROPH/DIAG INJ IV PUSH: CPT

## 2024-04-15 PROCEDURE — 85027 COMPLETE CBC AUTOMATED: CPT

## 2024-04-15 PROCEDURE — 82150 ASSAY OF AMYLASE: CPT

## 2024-04-15 PROCEDURE — 83735 ASSAY OF MAGNESIUM: CPT

## 2024-04-15 PROCEDURE — 6370000000 HC RX 637 (ALT 250 FOR IP): Performed by: EMERGENCY MEDICINE

## 2024-04-15 PROCEDURE — 84703 CHORIONIC GONADOTROPIN ASSAY: CPT

## 2024-04-15 PROCEDURE — 87086 URINE CULTURE/COLONY COUNT: CPT

## 2024-04-15 PROCEDURE — 99284 EMERGENCY DEPT VISIT MOD MDM: CPT

## 2024-04-15 PROCEDURE — 81001 URINALYSIS AUTO W/SCOPE: CPT

## 2024-04-15 PROCEDURE — 83605 ASSAY OF LACTIC ACID: CPT

## 2024-04-15 PROCEDURE — 74176 CT ABD & PELVIS W/O CONTRAST: CPT

## 2024-04-15 RX ORDER — KETOROLAC TROMETHAMINE 30 MG/ML
30 INJECTION, SOLUTION INTRAMUSCULAR; INTRAVENOUS ONCE
Status: COMPLETED | OUTPATIENT
Start: 2024-04-15 | End: 2024-04-15

## 2024-04-15 RX ORDER — ONDANSETRON 2 MG/ML
4 INJECTION INTRAMUSCULAR; INTRAVENOUS ONCE
Status: COMPLETED | OUTPATIENT
Start: 2024-04-15 | End: 2024-04-15

## 2024-04-15 RX ORDER — OXYCODONE HYDROCHLORIDE AND ACETAMINOPHEN 5; 325 MG/1; MG/1
1 TABLET ORAL ONCE
Status: COMPLETED | OUTPATIENT
Start: 2024-04-15 | End: 2024-04-15

## 2024-04-15 RX ORDER — 0.9 % SODIUM CHLORIDE 0.9 %
1000 INTRAVENOUS SOLUTION INTRAVENOUS ONCE
Status: COMPLETED | OUTPATIENT
Start: 2024-04-15 | End: 2024-04-15

## 2024-04-15 RX ORDER — TRAMADOL HYDROCHLORIDE 50 MG/1
50 TABLET ORAL EVERY 6 HOURS PRN
Qty: 12 TABLET | Refills: 0 | Status: SHIPPED | OUTPATIENT
Start: 2024-04-15 | End: 2024-04-18

## 2024-04-15 RX ADMIN — ONDANSETRON 4 MG: 2 INJECTION INTRAMUSCULAR; INTRAVENOUS at 03:06

## 2024-04-15 RX ADMIN — SODIUM CHLORIDE 1000 ML: 9 INJECTION, SOLUTION INTRAVENOUS at 03:05

## 2024-04-15 RX ADMIN — OXYCODONE HYDROCHLORIDE AND ACETAMINOPHEN 1 TABLET: 5; 325 TABLET ORAL at 03:37

## 2024-04-15 RX ADMIN — KETOROLAC TROMETHAMINE 30 MG: 30 INJECTION, SOLUTION INTRAMUSCULAR at 03:08

## 2024-04-15 ASSESSMENT — PAIN SCALES - GENERAL: PAINLEVEL_OUTOF10: 8

## 2024-04-15 ASSESSMENT — PAIN DESCRIPTION - LOCATION
LOCATION: FLANK;SHOULDER
LOCATION: FLANK

## 2024-04-15 ASSESSMENT — PAIN DESCRIPTION - ORIENTATION
ORIENTATION: LEFT
ORIENTATION: LEFT

## 2024-04-15 NOTE — DISCHARGE INSTRUCTIONS
Follow-up with gynecology for possible ultrasound soon as possible return if increased fevers vomiting chest pain shortness of breath or any bleeding follow-up with the surgeon who did the surgery including clinic as well

## 2024-04-15 NOTE — ED PROVIDER NOTES
HPI:  4/15/24,   Time: 4:24 AM EDT         Ayden Barragan is a 48 y.o. female presenting to the ED for left flank pain abdominal, beginning hours ago.  The complaint has been persistent, moderate in severity, and worsened by nothing.  Patient does not have a umbilical hernia that is obstructing she has nonspecific abdominal pain her pain seems to be worse with movement her pain was in the left flank she has been urinating normally normal bowel movements no chest pain or shortness of breath no leg swelling no calf pain her vital signs are stable she is not tachycardic her pulse ox is normal no vaginal bleeding or discharge denies any chance of being pregnant    ROS:   Pertinent positives and negatives are stated within HPI, all other systems reviewed and are negative.  --------------------------------------------- PAST HISTORY ---------------------------------------------  Past Medical History:  has a past medical history of Abnormal Pap smear of cervix, Anemia, Anxiety, Asthma, Blind, Connective tissue disease (HCC), Cyst of right breast, DDD (degenerative disc disease), cervical, Depression, Diabetes mellitus (HCC), Dysphagia, Esophagus disorder, Fibromyalgia, Gastroparesis, GERD (gastroesophageal reflux disease), Headache, Hematuria, Hyperlipidemia, Hypertension, Hypothyroidism, IBS (irritable bowel syndrome), Immune deficiency disorder (HCC), Insomnia, Kidney stones, Meningitis, Meningitis, PAULINE on CPAP, PCOS (polycystic ovarian syndrome), POTS (postural orthostatic tachycardia syndrome), Problems with swallowing, Pseudotumor cerebri, Raynaud's disease, Rectal bleeding, RLS (restless legs syndrome), Scleroderma (HCC), Sjogren's disease (HCC), Sudden adrenal gland insufficiency (HCC), Thyroid nodule, and TIA (transient ischemic attack).    Past Surgical History:  has a past surgical history that includes Hysterectomy; Cholecystectomy; Appendectomy; Carpal tunnel release (Bilateral); Port Surgery; and

## 2024-04-17 ENCOUNTER — TELEPHONE (OUTPATIENT)
Dept: PRIMARY CARE | Facility: CLINIC | Age: 49
End: 2024-04-17
Payer: MEDICAID

## 2024-04-17 LAB
MICROORGANISM SPEC CULT: ABNORMAL
MICROORGANISM SPEC CULT: ABNORMAL
SPECIMEN DESCRIPTION: ABNORMAL

## 2024-04-17 NOTE — TELEPHONE ENCOUNTER
Sharita from [employer] [place] [facility] is sking if you can fax her most recent OVN and med list and she will be doing her yearly evaluation for the Homecare Waiver program today at 1:00, please fax to Jocelyn Sheriff at [phone]

## 2024-04-18 ENCOUNTER — LAB (OUTPATIENT)
Dept: LAB | Facility: LAB | Age: 49
End: 2024-04-18
Payer: MEDICAID

## 2024-04-18 DIAGNOSIS — Z79.4 TYPE 2 DIABETES MELLITUS WITH DIABETIC AUTONOMIC NEUROPATHY, WITH LONG-TERM CURRENT USE OF INSULIN (MULTI): ICD-10-CM

## 2024-04-18 DIAGNOSIS — E11.43 TYPE 2 DIABETES MELLITUS WITH DIABETIC AUTONOMIC NEUROPATHY, WITH LONG-TERM CURRENT USE OF INSULIN (MULTI): ICD-10-CM

## 2024-04-18 DIAGNOSIS — E04.1 THYROID NODULE: ICD-10-CM

## 2024-04-18 DIAGNOSIS — E06.3 HYPOTHYROIDISM DUE TO HASHIMOTO'S THYROIDITIS: ICD-10-CM

## 2024-04-18 DIAGNOSIS — E27.8 ADRENAL NODULE (MULTI): ICD-10-CM

## 2024-04-18 DIAGNOSIS — E03.8 HYPOTHYROIDISM DUE TO HASHIMOTO'S THYROIDITIS: ICD-10-CM

## 2024-04-18 LAB
CHOLEST SERPL-MCNC: 204 MG/DL (ref 0–199)
CHOLESTEROL/HDL RATIO: 4.4
EST. AVERAGE GLUCOSE BLD GHB EST-MCNC: 180 MG/DL
HBA1C MFR BLD: 7.9 %
HDLC SERPL-MCNC: 46.5 MG/DL
LDLC SERPL CALC-MCNC: 103 MG/DL
NON HDL CHOLESTEROL: 158 MG/DL (ref 0–149)
TRIGL SERPL-MCNC: 272 MG/DL (ref 0–149)
VLDL: 54 MG/DL (ref 0–40)

## 2024-04-18 PROCEDURE — 84443 ASSAY THYROID STIM HORMONE: CPT

## 2024-04-18 PROCEDURE — 83036 HEMOGLOBIN GLYCOSYLATED A1C: CPT

## 2024-04-18 PROCEDURE — 36415 COLL VENOUS BLD VENIPUNCTURE: CPT

## 2024-04-18 PROCEDURE — 82533 TOTAL CORTISOL: CPT

## 2024-04-18 PROCEDURE — 80061 LIPID PANEL: CPT

## 2024-04-22 ENCOUNTER — APPOINTMENT (OUTPATIENT)
Dept: ENDOCRINOLOGY | Facility: CLINIC | Age: 49
End: 2024-04-22
Payer: MEDICAID

## 2024-04-22 ENCOUNTER — TELEMEDICINE (OUTPATIENT)
Dept: ENDOCRINOLOGY | Facility: CLINIC | Age: 49
End: 2024-04-22
Payer: MEDICAID

## 2024-04-22 DIAGNOSIS — E04.1 THYROID NODULE: ICD-10-CM

## 2024-04-22 DIAGNOSIS — E27.8 ADRENAL NODULE (MULTI): ICD-10-CM

## 2024-04-22 DIAGNOSIS — Z79.4 TYPE 2 DIABETES MELLITUS WITH DIABETIC AUTONOMIC NEUROPATHY, WITH LONG-TERM CURRENT USE OF INSULIN (MULTI): Primary | ICD-10-CM

## 2024-04-22 DIAGNOSIS — E11.43 TYPE 2 DIABETES MELLITUS WITH DIABETIC AUTONOMIC NEUROPATHY, WITH LONG-TERM CURRENT USE OF INSULIN (MULTI): Primary | ICD-10-CM

## 2024-04-22 DIAGNOSIS — E03.8 HYPOTHYROIDISM DUE TO HASHIMOTO'S THYROIDITIS: ICD-10-CM

## 2024-04-22 DIAGNOSIS — E06.3 HYPOTHYROIDISM DUE TO HASHIMOTO'S THYROIDITIS: ICD-10-CM

## 2024-04-22 LAB — TSH SERPL-ACNC: 0.84 MIU/L (ref 0.44–3.98)

## 2024-04-22 PROCEDURE — 3051F HG A1C>EQUAL 7.0%<8.0%: CPT | Performed by: INTERNAL MEDICINE

## 2024-04-22 PROCEDURE — 1036F TOBACCO NON-USER: CPT | Performed by: INTERNAL MEDICINE

## 2024-04-22 PROCEDURE — 95251 CONT GLUC MNTR ANALYSIS I&R: CPT | Performed by: INTERNAL MEDICINE

## 2024-04-22 PROCEDURE — 3049F LDL-C 100-129 MG/DL: CPT | Performed by: INTERNAL MEDICINE

## 2024-04-22 PROCEDURE — 3008F BODY MASS INDEX DOCD: CPT | Performed by: INTERNAL MEDICINE

## 2024-04-22 PROCEDURE — 99215 OFFICE O/P EST HI 40 MIN: CPT | Performed by: INTERNAL MEDICINE

## 2024-04-22 RX ORDER — EZETIMIBE 10 MG/1
10 TABLET ORAL DAILY
Qty: 30 TABLET | Refills: 11 | Status: SHIPPED | OUTPATIENT
Start: 2024-04-22 | End: 2025-04-22

## 2024-04-22 RX ORDER — SEMAGLUTIDE 0.68 MG/ML
0.5 INJECTION, SOLUTION SUBCUTANEOUS
Qty: 2 ML | Refills: 3 | Status: SHIPPED | OUTPATIENT
Start: 2024-04-22

## 2024-04-22 NOTE — PROGRESS NOTES
Consults    Reason For Consult  Adrenal nodule . Weight gain., bruising     History Of Present Illness      Jonathan Ayala is a 48 y.o. female presenting with multiple issues       She had back pain , adrenal nodule   CT 4/2024   Small left adrenal nodule measuring 11 mm measures 8 Hounsfield units consistent   with an adrenal adenoma.         1 units per 3 carbs   Toujeo 50 units (she was on 44 )      1 units 30 for correction     type 2 diabetes on insulin therapy    Initial diagnosis with diabetes was 2003. The patient has a family history of type 2 including father  She had comorbid conditions including obesity with BMI 46, hyperlipidemia, Vit D deficiency        Currently 44 units toujeo, we will change 50 units      Ozempic was giving side effects when it went up to 2 mg weekly  She did not have any around 0.5-1 mg weekly     ERCP and MRI pancreas done - pancreatitis , none for many years ( GI was not sure if it was true pancreatitis)   sees neurology at [facility] and also she sees migraine      nausea        Humalog 1 unit per 3 gram, 1 unit per 12 mg/dl     The patient is currently checking the blood glucose using Dexcom CGM.      Hypoglycemia frequency: 1%  Hypoglycemia awareness: yes       Last foot exam: 3/2024  Last eye exam: Dr Mederos, 3/2024   Last urine albumin:  4/ 2024  Last LDL: 4/2024   had double vision with statin     She was evaluated for seizure disorder    States she has epileptic and nonepileptic seizures,           Home Management    Results from Most Recent A1C  Hemoglobin A1C   Date/Time Value Ref Range Status   04/18/2024 08:19 AM 7.9 (H) see below % Final        Diabetes Problem List Entries with Dates  Problem List:  2023-07: Type 2 diabetes mellitus with diabetic autonomic neuropathy,   with long-term current use of insulin (Multi)  2020-08: Diabetic gastroparesis associated with type 2 diabetes   mellitus (Multi)      History of DKA with Dates:   This is not able to automated, and will  cause the clinician to review the entire history of patient. Solved, need to look at History of Diabetes Problem List. Includes resolved entries. Clinician can look above and enter the count.      History where DKA was Reason for Admission in last year none          NOTE: Anything under this line is for testing purposes only       Any family history of thyroid cancer? NO  Any personal history of radiation to your head/neck? NO    Past Medical History  She has a past medical history of Abnormal findings on diagnostic imaging of other abdominal regions, including retroperitoneum (10/14/2020), Acquired deformity of nose (03/24/2022), Acute upper respiratory infection, unspecified (10/16/2019), Allergy status to unspecified drugs, medicaments and biological substances (05/22/2020), Allergy status to unspecified drugs, medicaments and biological substances (11/13/2020), Benign intracranial hypertension (01/27/2022), Bipolar disorder, unspecified (Multi), Breast calcification, right (08/21/2018), Cellulitis of abdominal wall (09/28/2022), Cervicalgia (07/01/2020), Chronic maxillary sinusitis (01/04/2022), Chronic sialoadenitis (03/16/2020), COVID-19 (01/06/2022), Decreased white blood cell count, unspecified (11/04/2019), Disturbances of salivary secretion (03/16/2020), Dry eye syndrome of bilateral lacrimal glands (10/07/2022), Encounter for preprocedural cardiovascular examination (02/01/2022), Food additives allergy status (06/11/2020), Fracture of nasal bones, initial encounter for closed fracture (03/03/2022), Granuloma of right orbit (10/07/2021), History of endometrial ablation (11/09/2017), Hyperlipidemia, Hypertension, Hyperthyroidism, Hypoglycemia, Hypothyroidism, Localized swelling, mass and lump, head (03/24/2022), Major depressive disorder, recurrent, in full remission (CMS-HCC) (10/07/2021), 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), Nipple discharge (08/24/2022), Ocular pain, right eye (10/07/2022),  Other abnormal and inconclusive findings on diagnostic imaging of breast (07/06/2020), Other anomalies of pupillary function (05/31/2019), Other chest pain (05/18/2020), Other conditions influencing health status (08/01/2022), Other conditions influencing health status (08/03/2021), Other specified disorders of eustachian tube, left ear (11/18/2019), Other specified disorders of nose and nasal sinuses (03/24/2022), Other specified disorders of nose and nasal sinuses (03/24/2022), Pelvic and perineal pain (07/06/2020), Personal history of other diseases of the circulatory system (04/07/2020), Personal history of other diseases of the circulatory system (04/07/2020), Personal history of other diseases of the circulatory system, Personal history of other diseases of the digestive system, Personal history of other diseases of the digestive system (03/02/2020), Personal history of other diseases of the musculoskeletal system and connective tissue (01/19/2022), Personal history of other diseases of the musculoskeletal system and connective tissue (03/02/2021), Personal history of other diseases of the musculoskeletal system and connective tissue (06/16/2020), Personal history of other diseases of the nervous system and sense organs (11/18/2019), Personal history of other diseases of the nervous system and sense organs (09/21/2022), Personal history of other diseases of the respiratory system (04/14/2021), Personal history of other endocrine, nutritional and metabolic disease (02/17/2021), Personal history of other mental and behavioral disorders (05/27/2021), Personal history of other specified conditions (09/07/2022), Personal history of other specified conditions (10/16/2019), Personal history of other specified conditions (09/28/2022), Personal history of other specified conditions (09/16/2021), Personal history of other specified conditions (02/01/2022), Personal history of other specified conditions (03/09/2022),  Personal history of other specified conditions (02/12/2014), Personal history of other specified conditions (10/27/2021), Personal history of other specified conditions (10/16/2019), Personal history of other specified conditions (02/26/2021), Personal history of other specified conditions (02/22/2021), Personal history of urinary calculi, Polycystic ovary syndrome, Postural orthostatic tachycardia syndrome (POTS), Rash and other nonspecific skin eruption (03/15/2022), Repeated falls (06/23/2021), Right lower quadrant pain (10/14/2020), Sjogren syndrome, unspecified (Multi), Slow transit constipation (07/09/2020), Subarachnoid hemorrhage, traumatic (Multi) (04/19/2023), Thyroid nodule, Traumatic subarachnoid hemorrhage with loss of consciousness of unspecified duration, subsequent encounter (03/15/2022), Traumatic subarachnoid hemorrhage without loss of consciousness, subsequent encounter, Type 2 diabetes mellitus (Multi), Unspecified disorder of refraction (10/07/2022), Unspecified optic neuritis (11/06/2020), Unspecified optic neuritis (11/06/2020), Unspecified visual loss (09/25/2019), Venous insufficiency (chronic) (peripheral) (10/18/2021), Viral infection, unspecified (01/11/2022), Vitamin D deficiency, and Vitamin D deficiency, unspecified (09/28/2022).    Surgical History  She has a past surgical history that includes Other surgical history (08/22/2019); Other surgical history (08/22/2019); Other surgical history (08/22/2019); Other surgical history (08/22/2019); Other surgical history (08/22/2019); Other surgical history (08/22/2019); MR angio neck wo IV contrast (02/08/2021); MR angio head wo IV contrast (02/08/2021); [place] tooth extraction (2004); Appendectomy (2017); Hysterectomy (2017); and Hernia repair (Right, 03/01/2024).     Social History  She reports that she has never smoked. She has never used smokeless tobacco. She reports that she does not currently use alcohol. She reports current drug use.  Drug: Benzodiazepines.    Family History  Family History   Problem Relation Name Age of Onset    Other (Perforated bowel) Mother      Hyperlipidemia Father Mac     Hypertension Father Mac     Heart attack Father Mac     Other (S/P CABG) Father Mac     Diabetes Father Mac     Prostate cancer Father Mac     Coronary artery disease Father Mac     Heart failure Father Mac     Stroke Father Mac     Stroke Sister Jazmin     Breast cancer Sister Jazmin     Lupus Sister Jazmin     Ovarian cancer Sister Jazmin     Hyperlipidemia Brother      Hypertension Brother      Diabetes Father's Sister Linda     Thyroid cancer Father's Sister Bekah     Thyroid disease Father's Sister Jessica     Colon cancer Father's Brother ?     Blindness Other Multiple     Melanoma Other      Asthma Other      Other (hay fever) Other      Allergies Other          Allergies  Acetazolamide, Atorvastatin, Cefdinir, Ceftriaxone, Doxepin, Duloxetine, Levofloxacin, Levofloxacin in d5w, Nutritional supplements, Ozempic [semaglutide], Prochlorperazine, Red dye, Rosemary, Rosemary oil, Strawberry, Sulfa (sulfonamide antibiotics), Topiramate, Tree pollen-black walnut, Tree pollen-pecan, Pjxsjmnu-6-kl7 antimigraine agents, [place], Aripiprazole, Aspartame, Aspartame (bulk), Fenofibrate, Gluten, Hydrochlorothiazide, Hydromorphone, Iron, Meclizine, Metformin, Propoxyphene, Statins-hmg-coa reductase inhibitors, Tetracyclines, Thiazides, Venlafaxine, Wheat, Adhesive tape-silicones, Barbiturates, Dhe, Diet foods, Ferrous sulfate, Nsaids (non-steroidal anti-inflammatory drug), Other, Sulfonylureas, Tobramycin, Vancomycin, Adhesive, Azithromycin, Betamethasone, House dust, Metoclopramide hcl, Milk, Prednisone, Propoxyphene-acetaminophen, Sulfacetamide sodium, and Zolpidem    Review of Systems    Past Medical History:   Diagnosis Date    Abnormal findings on diagnostic imaging of other abdominal regions, including retroperitoneum 10/14/2020    Abnormal CT of the  abdomen    Acquired deformity of nose 03/24/2022    Nasal deformity    Acute upper respiratory infection, unspecified 10/16/2019    Acute URI    Allergy status to unspecified drugs, medicaments and biological substances 05/22/2020    History of drug allergy    Allergy status to unspecified drugs, medicaments and biological substances 11/13/2020    History of adverse drug reaction    Benign intracranial hypertension 01/27/2022    Pseudotumor cerebri    Bipolar disorder, unspecified (Multi)     Bipolar disease, chronic    Breast calcification, right 08/21/2018    Cellulitis of abdominal wall 09/28/2022    Cellulitis of right abdominal wall    Cervicalgia 07/01/2020    Cervicalgia of [id number]-[id number] region    Chronic maxillary sinusitis 01/04/2022    Chronic maxillary sinusitis    Chronic sialoadenitis 03/16/2020    Chronic sialoadenitis    COVID-19 01/06/2022    COVID-19 with multiple comorbidities    Decreased white blood cell count, unspecified 11/04/2019    Leukopenia    Disturbances of salivary secretion 03/16/2020    Xerostomia    Dry eye syndrome of bilateral lacrimal glands 10/07/2022    Dry eyes, bilateral    Encounter for preprocedural cardiovascular examination 02/01/2022    Preoperative cardiovascular examination    Food additives allergy status 06/11/2020    Allergy to food dye    Fracture of nasal bones, initial encounter for closed fracture 03/03/2022    Closed fracture of nasal bone, initial encounter    Granuloma of right orbit 10/07/2021    Inflammatory pseudotumor of right orbit    History of endometrial ablation 11/09/2017    Hyperlipidemia     Hypertension     Hyperthyroidism     Hypoglycemia     Hypothyroidism     Localized swelling, mass and lump, head 03/24/2022    Swollen nose    Major depressive disorder, recurrent, in full remission (CMS-HCC) 10/07/2021    Depression, major, recurrent, in complete remission    Mammary duct ectasia of left breast 08/24/2022    Periductal mastitis of  left breast    Nipple discharge 08/24/2022    Bloody discharge from left nipple    Ocular pain, right eye 10/07/2022    Pain in right eye    Other abnormal and inconclusive findings on diagnostic imaging of breast 07/06/2020    Other abnormal and inconclusive findings on diagnostic imaging of breast    Other anomalies of pupillary function 05/31/2019    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    Other chest pain 05/18/2020    Chest discomfort    Other conditions influencing health status 08/01/2022    History of cough    Other conditions influencing health status 08/03/2021    Chronic migraine    Other specified disorders of eustachian tube, left ear 11/18/2019    ETD (Eustachian tube dysfunction), left    Other specified disorders of nose and nasal sinuses 03/24/2022    Nasal dryness    Other specified disorders of nose and nasal sinuses 03/24/2022    Nasal crusting    Pelvic and perineal pain 07/06/2020    Pelvic pain    Personal history of other diseases of the circulatory system 04/07/2020    History of sinus tachycardia    Personal history of other diseases of the circulatory system 04/07/2020    History of abnormal electrocardiography    Personal history of other diseases of the circulatory system     History of Raynaud's syndrome    Personal history of other diseases of the digestive system     History of irritable bowel syndrome    Personal history of other diseases of the digestive system 03/02/2020    History of oral pain    Personal history of other diseases of the musculoskeletal system and connective tissue 01/19/2022    History of neck pain    Personal history of other diseases of the musculoskeletal system and connective tissue 03/02/2021    History of scleroderma    Personal history of other diseases of the musculoskeletal system and connective tissue 06/16/2020    History of muscle weakness    Personal history of other diseases of the nervous system and sense organs 11/18/2019    History of  hearing loss    Personal history of other diseases of the nervous system and sense organs 09/21/2022    History of partial seizures    Personal history of other diseases of the respiratory system 04/14/2021    History of asthma    Personal history of other endocrine, nutritional and metabolic disease 02/17/2021    History of diabetes mellitus    Personal history of other mental and behavioral disorders 05/27/2021    History of anxiety    Personal history of other specified conditions 09/07/2022    History of nipple discharge    Personal history of other specified conditions 10/16/2019    History of headache    Personal history of other specified conditions 09/28/2022    History of lump of left breast    Personal history of other specified conditions 09/16/2021    History of persistent cough    Personal history of other specified conditions 02/01/2022    History of palpitations    Personal history of other specified conditions 03/09/2022    History of headache    Personal history of other specified conditions 02/12/2014    History of chest pain    Personal history of other specified conditions 10/27/2021    History of nausea and vomiting    Personal history of other specified conditions 10/16/2019    History of fatigue    Personal history of other specified conditions 02/26/2021    History of orthopnea    Personal history of other specified conditions 02/22/2021    History of shortness of breath    Personal history of urinary calculi     H/O renal calculi    Polycystic ovary syndrome     Postural orthostatic tachycardia syndrome (POTS)     POTS (postural orthostatic tachycardia syndrome)    Rash and other nonspecific skin eruption 03/15/2022    Rash    Repeated falls 06/23/2021    Recurrent falls    Right lower quadrant pain 10/14/2020    Abdominal pain, RLQ (right lower quadrant)    Sjogren syndrome, unspecified (Multi)     History of Sjogren's disease    Slow transit constipation 07/09/2020    Slow transit  constipation    Subarachnoid hemorrhage, traumatic (Multi) 04/19/2023    Thyroid nodule     Traumatic subarachnoid hemorrhage with loss of consciousness of unspecified duration, subsequent encounter 03/15/2022    Subarachnoid hemorrhage following injury, with loss of consciousness, subsequent encounter    Traumatic subarachnoid hemorrhage without loss of consciousness, subsequent encounter     Subarachnoid hemorrhage following injury, no loss of consciousness, subsequent encounter    Type 2 diabetes mellitus (Multi)     Unspecified disorder of refraction 10/07/2022    Refractive error    Unspecified optic neuritis 11/06/2020    Right optic neuritis    Unspecified optic neuritis 11/06/2020    Optic neuritis, right    Unspecified visual loss 09/25/2019    Vision loss    Venous insufficiency (chronic) (peripheral) 10/18/2021    Chronic venous insufficiency of lower extremity    Viral infection, unspecified 01/11/2022    Nonspecific syndrome suggestive of viral illness    Vitamin D deficiency     Vitamin D deficiency, unspecified 09/28/2022    Vitamin D deficiency       Past Surgical History:   Procedure Laterality Date    APPENDECTOMY  2017    HERNIA REPAIR Right 03/01/2024    with mesh    HYSTERECTOMY  2017    MR HEAD ANGIO WO IV CONTRAST  02/08/2021    MR HEAD ANGIO WO IV CONTRAST 2/8/2021 [facility]    MR NECK ANGIO WO IV CONTRAST  02/08/2021    MR NECK ANGIO WO IV CONTRAST 2/8/2021 [facility] CLINICAL [facility]GAC[facility]    OTHER SURGICAL HISTORY  08/22/2019    Carpal tunnel surgery    OTHER SURGICAL HISTORY  08/22/2019    Hysterectomy    OTHER SURGICAL HISTORY  08/22/2019    Venous access port placement    OTHER SURGICAL HISTORY  08/22/2019    Cholecystectomy    OTHER SURGICAL HISTORY  08/22/2019    Appendectomy    OTHER SURGICAL HISTORY  08/22/2019    Pyloroplasty    WISDOM TOOTH EXTRACTION  2004       Social History     Socioeconomic History    Marital status:      Spouse name: Not on file    Number of children:  Not on file    Years of education: Not on file    Highest education level: Not on file   Occupational History    Not on file   Tobacco Use    Smoking status: Never    Smokeless tobacco: Never   Vaping Use    Vaping status: Never Used   Substance and Sexual Activity    Alcohol use: Not Currently     Comment: Socially in College    Drug use: Yes     Types: Benzodiazepines    Sexual activity: Not Currently     Partners: Male     Birth control/protection: Abstinence, Surgical     Comment: Partial Hysterectomy   Other Topics Concern    Not on file   Social History Narrative    Not on file     Social Determinants of Health     Financial Resource Strain: Low Risk  (7/28/2021)    Received from [facility]    Overall Financial Resource Strain (CARDIA)     Difficulty of Paying Living Expenses: Not hard at all   Food Insecurity: No Food Insecurity (7/28/2021)    Received from [facility]    Hunger Vital Sign     Worried About Running Out of Food in the Last Year: Never true     Ran Out of Food in the Last Year: Never true   Transportation Needs: No Transportation Needs (7/28/2021)    Received from [facility]    PRAPARE - Transportation     Lack of Transportation (Medical): No     Lack of Transportation (Non-Medical): No   Physical Activity: Inactive (7/28/2021)    Received from [facility]    Exercise Vital Sign     Days of Exercise per Week: 0 days     Minutes of Exercise per Session: 0 min   Stress: Stress Concern Present (7/28/2021)    Received from [facility]    [place] [employer] - Occupational Stress Questionnaire     Feeling of Stress : To some extent   Social Connections: Moderately Integrated (7/28/2021)    Received from [facility]    Social Connection and Isolation Panel [NHANES]     Frequency of Communication with Friends and Family: Three times a week      Frequency of Social Gatherings with Friends and Family: Twice a week     Attends Yazidism Services: 1 to 4 times per year     Active Member of Clubs or Organizations: No     Attends Club or Organization Meetings: Never     Marital Status:    Intimate Partner Violence: Not on file   Housing Stability: Low Risk  (7/28/2021)    Received from [facility]    Housing Stability Vital Sign     Unable to Pay for Housing in the Last Year: No     Number of Places Lived in the Last Year: 1     Unstable Housing in the Last Year: No        Physical Exam     ROS, PMH, FH/SH, surgical history and allergies have been reviewed.    Last Recorded Vitals  There were no vitals taken for this visit.    Relevant Results         If you would like to pull in Medications, type .meds     If you would like to pull in Lab results for the last 24 hours, type .[id number]    If you would like to pull in specific Lab results, type .ll     If you would like to pull in Imaging results, type .imgrslt :99}  Lab Review  Lab Results   Component Value Date    BILITOT 0.3 02/22/2024    CALCIUM 9.0 02/22/2024    CO2 28 02/22/2024     02/22/2024    CREATININE 0.66 02/22/2024    GLUCOSE 162 (H) 02/22/2024    ALKPHOS 58 02/22/2024    K 3.7 02/22/2024    PROT 7.8 02/22/2024     02/22/2024    AST 21 02/22/2024    ALT 34 02/22/2024    BUN 24 (H) 02/22/2024    ANIONGAP 12 02/22/2024    MG 1.77 02/06/2024    PHOS 3.8 04/30/2023    LDH 95 07/17/2020    ALBUMIN 3.8 02/22/2024    LIPASE 83 (H) 02/22/2024    GFRF 89 08/06/2023    GFRMALE CANCELED 04/30/2023     Lab Results   Component Value Date    TRIG 272 (H) 04/18/2024    CHOL 204 (H) 04/18/2024    LDLCALC 103 (H) 04/18/2024    HDL 46.5 04/18/2024     Lab Results   Component Value Date    HGBA1C 7.9 (H) 04/18/2024    HGBA1C 5.9 (H) 11/08/2023    HGBA1C 7.8 (A) 02/23/2023     The ASCVD Risk score (Mamie SANDERSON, et al., 2019) failed to calculate for the following reasons:    The  patient has a prior MI or stroke diagnosis       Assessment/Plan   Problem List Items Addressed This Visit             ICD-10-CM    Hypothyroidism E03.9    Relevant Orders    TSH with reflex to Free T4 if abnormal    ACTH    Cortisol AM    Thyroid nodule E04.1    Relevant Medications    semaglutide (Ozempic) 0.25 mg or 0.5 mg (2 mg/3 mL) pen injector    ezetimibe (Zetia) 10 mg tablet    Other Relevant Orders    Aldosterone, Serum    Renin Activity    Metanephrines Plasma    Basic metabolic panel    US thyroid    TSH with reflex to Free T4 if abnormal    ACTH    Cortisol AM    Type 2 diabetes mellitus with diabetic autonomic neuropathy, with long-term current use of insulin (Multi) - Primary E11.43, Z79.4    Relevant Medications    semaglutide (Ozempic) 0.25 mg or 0.5 mg (2 mg/3 mL) pen injector    ezetimibe (Zetia) 10 mg tablet    Other Relevant Orders    Aldosterone, Serum    Renin Activity    Metanephrines Plasma    Basic metabolic panel    US thyroid    TSH with reflex to Free T4 if abnormal    ACTH    Cortisol AM     Other Visit Diagnoses         Codes    Adrenal nodule (Multi)     E27.8    Relevant Medications    semaglutide (Ozempic) 0.25 mg or 0.5 mg (2 mg/3 mL) pen injector    ezetimibe (Zetia) 10 mg tablet    Other Relevant Orders    Aldosterone, Serum    Renin Activity    Metanephrines Plasma    Basic metabolic panel    CT adrenals w and wo IV contrast    US thyroid    TSH with reflex to Free T4 if abnormal    ACTH    Cortisol AM                      Ovarian cancer sister  from recently, she has CT and showed ovarian enlargement   Other sister had breast cancer           Adrenal insufficiency (255.41) (E27.40)   · Dyslipidemia, goal LDL below 70 (272.4) (E78.5) zetia RX provided due to statin intolerance      · Type 2 diabetes mellitus with hyperglycemia, with long-term current use of insulin  (250.00,790.29,V58.67) (E11.65,Z79.4)   · Thyroid nodule (241.0) (E04.1)  due US , order placed    ·  Hypothyroidism (244.9) (E03.9)     secondary insufficiency , previous history of frontal lobe infarct  HC current;y on 10 mg twice a day , she will do 10-5 due to frequent yeast infection       Change diabetes regimen is as follows:   toujeo 50 units daily      Continue  Humalog 1 unit per 4 gram, 1 unit per 30 mg/dl  Farxiga 10 mg daily      Resume  ozempic 0.25 mg weekly,  she tolerated until 1 mg a week, she wants to try   As her BG were much better with the medication   ophthalmology  3/2024   has carpal tunnel and sees neurology for neuropathy and migraine      hypothyroid treated TSH 1/2024 we will check blood tests , orders placed           She also has some bruising , we will try 10 mg in am and 5 mg in afternoon       Marah Felix MD                WE WILL TRY TO LOWER HER HC AND discontinue IF POSSIBLE TO SEE IF ADRENAL NODULE IS HYPERACTIVE AS SHE IS GAINING WEIGHT AND HAS EASY BRUISING         To discuss omnipod with Joanna Evans        I spent a total of 40+ minutes on the date of the service which included preparing to see the patient, face-to-face patient care, completing clinical documentation, obtaining and / or reviewing separately obtained history, counseling and educating the patient/family/caregiver, ordering medications, tests, or procedures, communicating with other healthcare providers (not separately reported), independently interpreting results, not separately reported, and communicating results to the patient/family/caregiver, real-time telemedicine visit using audiovisual technology with patient's verbal consent.  Name and date of birth verified.

## 2024-04-23 ENCOUNTER — LAB (OUTPATIENT)
Dept: LAB | Facility: LAB | Age: 49
End: 2024-04-23
Payer: MEDICAID

## 2024-04-23 ENCOUNTER — PATIENT MESSAGE (OUTPATIENT)
Dept: OPHTHALMOLOGY | Facility: CLINIC | Age: 49
End: 2024-04-23

## 2024-04-23 DIAGNOSIS — E11.43 TYPE 2 DIABETES MELLITUS WITH DIABETIC AUTONOMIC (POLY)NEUROPATHY (MULTI): Primary | ICD-10-CM

## 2024-04-23 DIAGNOSIS — E03.8 HYPOTHYROIDISM DUE TO HASHIMOTO'S THYROIDITIS: ICD-10-CM

## 2024-04-23 DIAGNOSIS — E06.3 AUTOIMMUNE THYROIDITIS: ICD-10-CM

## 2024-04-23 DIAGNOSIS — E03.8 OTHER SPECIFIED HYPOTHYROIDISM: ICD-10-CM

## 2024-04-23 DIAGNOSIS — E27.8 OTHER SPECIFIED DISORDERS OF ADRENAL GLAND (MULTI): ICD-10-CM

## 2024-04-23 DIAGNOSIS — E27.8 ADRENAL NODULE (MULTI): ICD-10-CM

## 2024-04-23 DIAGNOSIS — E04.1 NONTOXIC SINGLE THYROID NODULE: ICD-10-CM

## 2024-04-23 DIAGNOSIS — E06.3 HYPOTHYROIDISM DUE TO HASHIMOTO'S THYROIDITIS: ICD-10-CM

## 2024-04-23 DIAGNOSIS — E04.1 THYROID NODULE: ICD-10-CM

## 2024-04-23 DIAGNOSIS — Z79.4 TYPE 2 DIABETES MELLITUS WITH DIABETIC AUTONOMIC NEUROPATHY, WITH LONG-TERM CURRENT USE OF INSULIN (MULTI): ICD-10-CM

## 2024-04-23 DIAGNOSIS — E11.43 TYPE 2 DIABETES MELLITUS WITH DIABETIC AUTONOMIC NEUROPATHY, WITH LONG-TERM CURRENT USE OF INSULIN (MULTI): ICD-10-CM

## 2024-04-23 DIAGNOSIS — Z79.4 LONG TERM (CURRENT) USE OF INSULIN (MULTI): ICD-10-CM

## 2024-04-23 LAB
ANION GAP SERPL CALC-SCNC: 12 MMOL/L (ref 10–20)
BUN SERPL-MCNC: 20 MG/DL (ref 6–23)
CALCIUM SERPL-MCNC: 9.1 MG/DL (ref 8.6–10.3)
CHLORIDE SERPL-SCNC: 103 MMOL/L (ref 98–107)
CO2 SERPL-SCNC: 29 MMOL/L (ref 21–32)
CORTIS AM PEAK SERPL-MSCNC: 1.1 UG/DL (ref 5–20)
CORTIS AM PEAK SERPL-MSCNC: 18.9 UG/DL (ref 5–20)
CREAT SERPL-MCNC: 0.85 MG/DL (ref 0.5–1.05)
EGFRCR SERPLBLD CKD-EPI 2021: 86 ML/MIN/1.73M*2
GLUCOSE SERPL-MCNC: 215 MG/DL (ref 74–99)
POTASSIUM SERPL-SCNC: 4.2 MMOL/L (ref 3.5–5.3)
SODIUM SERPL-SCNC: 140 MMOL/L (ref 136–145)
TSH SERPL-ACNC: 2.5 MIU/L (ref 0.44–3.98)

## 2024-04-23 PROCEDURE — 36415 COLL VENOUS BLD VENIPUNCTURE: CPT

## 2024-04-23 PROCEDURE — 80048 BASIC METABOLIC PNL TOTAL CA: CPT

## 2024-04-23 PROCEDURE — 82024 ASSAY OF ACTH: CPT

## 2024-04-23 PROCEDURE — 84244 ASSAY OF RENIN: CPT

## 2024-04-23 PROCEDURE — 83835 ASSAY OF METANEPHRINES: CPT

## 2024-04-23 PROCEDURE — 82088 ASSAY OF ALDOSTERONE: CPT

## 2024-04-23 PROCEDURE — 82533 TOTAL CORTISOL: CPT

## 2024-04-23 PROCEDURE — 84443 ASSAY THYROID STIM HORMONE: CPT

## 2024-04-24 ENCOUNTER — SPECIALTY PHARMACY (OUTPATIENT)
Dept: PHARMACY | Facility: CLINIC | Age: 49
End: 2024-04-24

## 2024-04-25 LAB
ACTH PLAS-MCNC: <1.5 PG/ML (ref 7.2–63.3)
ALDOST SERPL-MCNC: 9.6 NG/DL

## 2024-04-26 ENCOUNTER — PATIENT MESSAGE (OUTPATIENT)
Dept: ENDOCRINOLOGY | Facility: CLINIC | Age: 49
End: 2024-04-26
Payer: MEDICAID

## 2024-04-26 ENCOUNTER — TELEPHONE (OUTPATIENT)
Dept: ENDOCRINOLOGY | Facility: CLINIC | Age: 49
End: 2024-04-26
Payer: MEDICAID

## 2024-04-26 DIAGNOSIS — E11.9 CONTROLLED TYPE 2 DIABETES MELLITUS WITHOUT COMPLICATION, UNSPECIFIED WHETHER LONG TERM INSULIN USE (MULTI): ICD-10-CM

## 2024-04-26 DIAGNOSIS — E27.8 ADRENAL NODULE (MULTI): ICD-10-CM

## 2024-04-26 DIAGNOSIS — E27.8 ADRENAL NODULE (MULTI): Primary | ICD-10-CM

## 2024-04-26 DIAGNOSIS — E04.1 THYROID NODULE: ICD-10-CM

## 2024-04-26 LAB
ANNOTATION COMMENT IMP: NORMAL
METANEPHS SERPL-SCNC: <0.1 NMOL/L (ref 0–0.49)
NORMETANEPH FREE SERPL-SCNC: 0.5 NMOL/L (ref 0–0.89)
RENIN PLAS-CCNC: 2.6 NG/ML/HR

## 2024-04-26 RX ORDER — DAPAGLIFLOZIN 5 MG/1
5 TABLET, FILM COATED ORAL DAILY
Qty: 90 TABLET | Refills: 3 | Status: SHIPPED | OUTPATIENT
Start: 2024-04-26 | End: 2024-04-29

## 2024-04-26 RX ORDER — DAPAGLIFLOZIN 5 MG/1
5 TABLET, FILM COATED ORAL 2 TIMES DAILY
Qty: 90 TABLET | Refills: 3 | Status: SHIPPED | OUTPATIENT
Start: 2024-04-26 | End: 2024-04-26 | Stop reason: SDUPTHER

## 2024-04-26 RX ORDER — INSULIN PUMP CONTROLLER
EACH MISCELLANEOUS
COMMUNITY

## 2024-04-26 RX ORDER — DEXAMETHASONE 1 MG/1
TABLET ORAL
Qty: 1 TABLET | Refills: 0 | Status: SHIPPED | OUTPATIENT
Start: 2024-04-26 | End: 2024-06-07

## 2024-04-26 NOTE — TELEPHONE ENCOUNTER
We received request for omnipod dash starter kit and pods , is patient starting omnipod ? Routing to provider for answer .Geovanna Evangelista LPN      Hemarthrosis

## 2024-04-26 NOTE — TELEPHONE ENCOUNTER
Jonathan Ayala requesting refill for farxiga. Order pended to provider . Geovanna Evangelista LPN

## 2024-04-26 NOTE — TELEPHONE ENCOUNTER
Patient contacted office to let us know she received providers message and will have requested testing completed on Monday 04/29/2024. Geovanna Evangelista LPN

## 2024-04-26 NOTE — TELEPHONE ENCOUNTER
Jonathan Ayala requesting refill for farxiga.   Order pended to provider . Geovanna Evangelista LPN

## 2024-04-29 ENCOUNTER — APPOINTMENT (OUTPATIENT)
Dept: OTOLARYNGOLOGY | Facility: CLINIC | Age: 49
End: 2024-04-29
Payer: MEDICAID

## 2024-04-29 ENCOUNTER — LAB (OUTPATIENT)
Dept: LAB | Facility: LAB | Age: 49
End: 2024-04-29
Payer: MEDICAID

## 2024-04-29 DIAGNOSIS — E27.8 ADRENAL NODULE (MULTI): ICD-10-CM

## 2024-04-29 DIAGNOSIS — K92.1 MELENA: Primary | ICD-10-CM

## 2024-04-29 DIAGNOSIS — K92.1 MELENA: ICD-10-CM

## 2024-04-29 DIAGNOSIS — E04.1 THYROID NODULE: ICD-10-CM

## 2024-04-29 LAB
BASOPHILS # BLD AUTO: 0.02 X10*3/UL (ref 0–0.1)
BASOPHILS NFR BLD AUTO: 0.5 %
CORTIS AM PEAK SERPL-MSCNC: 1.2 UG/DL (ref 5–20)
EOSINOPHIL # BLD AUTO: 0 X10*3/UL (ref 0–0.7)
EOSINOPHIL NFR BLD AUTO: 0 %
ERYTHROCYTE [DISTWIDTH] IN BLOOD BY AUTOMATED COUNT: 15 % (ref 11.5–14.5)
FERRITIN SERPL-MCNC: 80 NG/ML (ref 8–150)
HCT VFR BLD AUTO: 41.2 % (ref 36–46)
HGB BLD-MCNC: 12.6 G/DL (ref 12–16)
IMM GRANULOCYTES # BLD AUTO: 0.01 X10*3/UL (ref 0–0.7)
IMM GRANULOCYTES NFR BLD AUTO: 0.2 % (ref 0–0.9)
IRON SATN MFR SERPL: 19 % (ref 25–45)
IRON SERPL-MCNC: 71 UG/DL (ref 35–150)
LYMPHOCYTES # BLD AUTO: 0.86 X10*3/UL (ref 1.2–4.8)
LYMPHOCYTES NFR BLD AUTO: 21.3 %
MCH RBC QN AUTO: 28.6 PG (ref 26–34)
MCHC RBC AUTO-ENTMCNC: 30.6 G/DL (ref 32–36)
MCV RBC AUTO: 94 FL (ref 80–100)
MONOCYTES # BLD AUTO: 0.29 X10*3/UL (ref 0.1–1)
MONOCYTES NFR BLD AUTO: 7.2 %
NEUTROPHILS # BLD AUTO: 2.86 X10*3/UL (ref 1.2–7.7)
NEUTROPHILS NFR BLD AUTO: 70.8 %
NRBC BLD-RTO: 0 /100 WBCS (ref 0–0)
PLATELET # BLD AUTO: 301 X10*3/UL (ref 150–450)
RBC # BLD AUTO: 4.4 X10*6/UL (ref 4–5.2)
TIBC SERPL-MCNC: 373 UG/DL (ref 240–445)
UIBC SERPL-MCNC: 302 UG/DL (ref 110–370)
WBC # BLD AUTO: 4 X10*3/UL (ref 4.4–11.3)

## 2024-04-29 PROCEDURE — 85025 COMPLETE CBC W/AUTO DIFF WBC: CPT

## 2024-04-29 PROCEDURE — 36415 COLL VENOUS BLD VENIPUNCTURE: CPT

## 2024-04-29 PROCEDURE — 80375 DRUG/SUBSTANCE NOS 1-3: CPT

## 2024-04-29 PROCEDURE — 82533 TOTAL CORTISOL: CPT

## 2024-04-29 PROCEDURE — 82728 ASSAY OF FERRITIN: CPT

## 2024-04-29 PROCEDURE — 83540 ASSAY OF IRON: CPT

## 2024-04-29 PROCEDURE — 83550 IRON BINDING TEST: CPT

## 2024-04-29 RX ORDER — DAPAGLIFLOZIN 10 MG/1
5 TABLET, FILM COATED ORAL DAILY
Qty: 15 TABLET | Refills: 11 | Status: SHIPPED | OUTPATIENT
Start: 2024-04-29 | End: 2024-05-03 | Stop reason: DRUGHIGH

## 2024-05-01 DIAGNOSIS — E11.9 TYPE 2 DIABETES MELLITUS WITHOUT COMPLICATION, UNSPECIFIED WHETHER LONG TERM INSULIN USE (MULTI): ICD-10-CM

## 2024-05-01 NOTE — TELEPHONE ENCOUNTER
Pharmacy requesting new scripts for Farxiga and Toujeo. Orders pended to provider Geovanna Evangelista LPN

## 2024-05-02 LAB — DEXAMETHASONE SERPL-MCNC: 340.4 NG/DL

## 2024-05-02 RX ORDER — DAPAGLIFLOZIN 5 MG/1
5 TABLET, FILM COATED ORAL DAILY
Qty: 90 TABLET | Refills: 3 | Status: SHIPPED | OUTPATIENT
Start: 2024-05-02 | End: 2025-05-02

## 2024-05-02 RX ORDER — INSULIN GLARGINE 300 [IU]/ML
INJECTION, SOLUTION SUBCUTANEOUS
Qty: 9 ML | Refills: 6 | Status: SHIPPED | OUTPATIENT
Start: 2024-05-02

## 2024-05-03 ENCOUNTER — SPECIALTY PHARMACY (OUTPATIENT)
Dept: PHARMACY | Facility: CLINIC | Age: 49
End: 2024-05-03

## 2024-05-03 PROCEDURE — RXMED WILLOW AMBULATORY MEDICATION CHARGE

## 2024-05-06 PROCEDURE — RXMED WILLOW AMBULATORY MEDICATION CHARGE

## 2024-05-07 ENCOUNTER — SPECIALTY PHARMACY (OUTPATIENT)
Dept: PHARMACY | Facility: CLINIC | Age: 49
End: 2024-05-07

## 2024-05-07 ENCOUNTER — PHARMACY VISIT (OUTPATIENT)
Dept: PHARMACY | Facility: CLINIC | Age: 49
End: 2024-05-07
Payer: MEDICAID

## 2024-05-08 ENCOUNTER — PHARMACY VISIT (OUTPATIENT)
Dept: PHARMACY | Facility: CLINIC | Age: 49
End: 2024-05-08
Payer: MEDICAID

## 2024-05-17 ENCOUNTER — PATIENT MESSAGE (OUTPATIENT)
Dept: ENDOCRINOLOGY | Facility: CLINIC | Age: 49
End: 2024-05-17
Payer: MEDICAID

## 2024-05-17 DIAGNOSIS — E27.8 ADRENAL NODULE (MULTI): Primary | ICD-10-CM

## 2024-05-17 DIAGNOSIS — D83.9 CVID (COMMON VARIABLE IMMUNODEFICIENCY) (MULTI): ICD-10-CM

## 2024-05-17 RX ORDER — HYDROCORTISONE 10 MG/1
TABLET ORAL
Qty: 300 TABLET | Refills: 0 | Status: SHIPPED | OUTPATIENT
Start: 2024-05-17

## 2024-05-17 RX ORDER — EPINEPHRINE 0.3 MG/.3ML
INJECTION SUBCUTANEOUS
Qty: 2 EACH | Refills: 0 | Status: SHIPPED | OUTPATIENT
Start: 2024-05-17

## 2024-05-17 NOTE — TELEPHONE ENCOUNTER
From: Jonathan Ayala  To: Marah Felix  Sent: 5/17/2024 12:08 AM EDT  Subject: Said 2 contact u in case of sx r/t change in dose not sure if r/t adrenals or not    My f/u appt for adrenals was pushed out approximately a month. Not sure if what’s going on is r/t medication dose change. URI got worse finished antibiotics was put on my usual methylprednisolone 4 mg x 6 tabs over a 35 day titration that Dr Love as usual. Was getting better until today when started 3 tablets. Started coughing up white thick mucus up again but no fever. Feel off balance, extremely tired, low back pain with muscle cramps in arch of feet muscle spasms in low back. Migraines starting to get worse again. I’ve lost a bit of weight. Lower legs painful to the touch. Been tested for COVID & Flu 3 times and negative every time. Nausea , vomiting after coughing spells. Just want to make sure this isn’t r/t the decrease in my hydrocortisone. Figured it’s what everyone I know is dealing with from this funky weather but just want to make sure. Thanks. Jonathan Ayala

## 2024-05-17 NOTE — PATIENT COMMUNICATION
Patient feels theodore condition is worsening , wondering if it's due to dose change , has follow up with Eb[facility] May 21st , follow up with Dr. Felix 06/25 Geovanna Evangelista LPN

## 2024-05-21 ENCOUNTER — TELEMEDICINE CLINICAL SUPPORT (OUTPATIENT)
Dept: ENDOCRINOLOGY | Facility: CLINIC | Age: 49
End: 2024-05-21
Payer: MEDICAID

## 2024-05-21 DIAGNOSIS — E11.43 TYPE 2 DIABETES MELLITUS WITH DIABETIC AUTONOMIC NEUROPATHY, WITH LONG-TERM CURRENT USE OF INSULIN (MULTI): ICD-10-CM

## 2024-05-21 DIAGNOSIS — E27.8 ADRENAL NODULE (MULTI): ICD-10-CM

## 2024-05-21 DIAGNOSIS — Z79.4 TYPE 2 DIABETES MELLITUS WITH DIABETIC AUTONOMIC NEUROPATHY, WITH LONG-TERM CURRENT USE OF INSULIN (MULTI): ICD-10-CM

## 2024-05-21 NOTE — PROGRESS NOTES
Plan; continue all meds  Will help switch to omnipod  Will help set up dietican refferal  Needs dexcom g6  Follow up 6 weeks      Clinical Pharmacy Team met with Jonathan Ayala regarding a consultation for diabetes management thanks to a referral from Dr. Marah Felix MD. Below is a summary of our conversation and recommendations:    ________________________________________________________________________      Allergies   Allergen Reactions    Acetazolamide Hives, Rash, Shortness of breath and Swelling     oral hives, redness, ABD pain    Atorvastatin Unknown     Causes muscle pain    Cefdinir Itching, Rash, Shortness of breath and Anaphylaxis     Itchy throat    Throat closing / difficulty breathing.  Took Benadryl at home.    Itchy throat      Throat closing / difficulty breathing.  Took Benadryl at home.    Ceftriaxone Anaphylaxis, Rash, Shortness of breath and Swelling     SOB    SOB hives turned red during gallbladder    Doxepin Anaphylaxis, Hives, Swelling, Angioedema and Rash     Itchy sore throat problems with swallowing    facial swelling    Itchy sore throat problems with swallowing      facial swelling    Duloxetine Anaphylaxis, Hallucinations and Rash     suicidal ideation    suicidal ideation     Other reaction(s): suicidal ideation    suicidal    Suicidal ideation    Levofloxacin Angioedema, Swelling and Rash     eyelid swelling    eyelid swelling. Patient tolerates Avelox    Levofloxacin In D5w Anaphylaxis     Moon face lips swelling just Levaquin tolerated D5W)    Nutritional Supplements Anaphylaxis     Grapes ( red dye    Prochlorperazine Anaphylaxis     HIGH Compazine involuntary muscle spasms low doses tolerated)    Red Dye Anaphylaxis    Becky Anaphylaxis and Swelling     Becky    SOB facial swelling    Rosemary Oil Anaphylaxis, Itching and Swelling     Becky  SOB facial swelling      SOB facial swelling    Strawberry Shortness of breath     White blisters in mouth      Other  "reaction(s): white blisters in mouth, shortness of breath    Strawberry Flavor Anaphylaxis     Allergy to strawberry fruit and juice    Sulfa (Sulfonamide Antibiotics) Anaphylaxis, Rash, Shortness of breath and Swelling     SOB itchy hives    Other Reaction(s): rash, SOB      SOB itchy hives    Topiramate Anaphylaxis, Swelling and Angioedema     eyelid swelling    Throat swelling    eyelid swelling  Throat swelling      Throat swelling      eyelid swelling    Tree Nuts Shortness of breath     walnuts    Tree Pollen-Black [place] Shortness of breath     Other Reaction(s): Other: See Comments      White blisters in mouth      blisters in mouth-carries an EPIPEN-\"I have gotten short of breath\"    Tree Pollen-Pecan Shortness of breath     pecans    [id number] Antimigraine Agents Anaphylaxis and Nausea Only     \"[id number]-wg5- anti migraine agents and DHE: can cause stroke per pt \"    None due to Raynaud's  ( Triptans cause a stroke)    [place] Shortness of breath    Aripiprazole Unknown, Fever and Other     fever and tremors    Fevers and Tremors    Tremor    Aspartame Diarrhea     Blood sugar [place], Diarrhea    Aspartame (Bulk) Diarrhea, Nausea Only and Nausea/vomiting     Other Reaction(s): nausea, diarrhea, abdominal pain      Blood sugar [place], Diarrhea    Fenofibrate Other     Double vision    Gluten Itching, Other and Rash     Rashes constipation gastric discomfort    Hydrochlorothiazide Hives, Itching and Rash     hctz removed as free-text allergy and entered as [facility] allergy,1455 10/6/2007JO      itchy hives    Hydromorphone Unknown, GI intolerance and Nausea Only     Intolerance nausea instant    Iron Hives and Rash     ichy hive ( can tolerate ferrous gluconate)    Meclizine Angioedema, Other and Swelling     eyelid swelling    Swelling of the eyelids    Eyelids and face    Metformin Other     Other reaction(s): Dyspepsia, Dyspepsia    Propoxyphene Unknown and Hives     Darvocet itchy hives    " Statins-Hmg-Coa Reductase Inhibitors Unknown     Double vision    Tetracyclines Hives, Itching and Rash     sulfa    Rash itching    Itchy  rash    Thiazides Hives, Itching and Rash     itchy hives    Venlafaxine Unknown and Fever     Fevers chills    Wheat Unknown     oral blisters    Adhesive Tape-Silicones Itching and Other    Barbiturates Unknown    Dhe Unknown     Raynaud's disease    Diet Foods Diarrhea     Aspartame allergy only. Removes to many foods to interface.    Ferrous Sulfate Hives    Nsaids (Non-Steroidal Anti-Inflammatory Drug) Unknown and Nausea/vomiting    Other Unknown    Sulfonylureas Unknown    Tobramycin Unknown    Vancomycin Unknown and Hives     Niyah syndrome    Other reaction(s): Other    Red man syndrome if given fast, benadryl with.     Niyah syndrome  Other reaction(s): Other      Other reaction(s): Other      Red man syndrome if given fast, benadryl with.    Adhesive Rash    Azithromycin Hives, Itching and Rash    Betamethasone Nausea And Vomiting, Other, GI intolerance and Diarrhea     N/V/D    House Dust Rash    Metoclopramide Hcl Other    Milk Unknown, Itching and Rash     ABD pain    Prednisone Rash    Propoxyphene-Acetaminophen Hives and Rash    Sulfacetamide Sodium Rash and Swelling    Zolpidem Other and Hallucinations     Sleep walk    Other Reaction(s): insomnia and agitation      Sleep walk         Diabetes Pharmacotherapy:   Toujeo 44 units subcutaneous once daily  Humalog with an ICR of 1:3 and ISF of 1:30  Farxiga 10 mg once daily    Patient reports tolerating this regimen well.      Lab Review  Lab Results   Component Value Date    BILITOT 0.3 08/06/2023    CALCIUM 9.5 08/06/2023    CO2 23 08/06/2023     08/06/2023    CREATININE 0.82 08/06/2023    GLUCOSE 135 (H) 08/06/2023    ALKPHOS 81 08/06/2023    K 4.2 08/06/2023    PROT 8.8 (H) 08/06/2023     08/06/2023    AST 21 08/06/2023    ALT 27 08/06/2023    BUN 17 08/06/2023    ANIONGAP 16 08/06/2023    MG 2.16  2023    PHOS 3.8 2023    LDH 95 2020    ALBUMIN 4.3 2023    LIPASE 20 2023    GFRF 89 2023    GFRMALE CANCELED 2023     Lab Results   Component Value Date    TRIG CANCELED 2023    CHOL CANCELED 2023    HDL CANCELED 2023     Lab Results   Component Value Date    HGBA1C 5.9 (H) 2023    HGBA1C 7.8 (A) 2023    HGBA1C 8.3 (A) 2022     The 10-year ASCVD risk score (Mamie SANDERSON, et al., 2019) is: 3.4%    Values used to calculate the score:      Age: 48 years      Sex: Female      Is Non- : No      Diabetic: Yes      Tobacco smoker: No      Systolic Blood Pressure: 146 mmHg      Is BP treated: Yes      HDL Cholesterol: 46.3 mg/dL      Total Cholesterol: 186 mg/dL          Assessment/Plan     The patient reports today for a diabetes consultation. Reviewed CGM report and discussed it with the patient. The patient is scheduled to undergo inguinal herniopathy and is here for periprocedural medication adjustments. Discussed this with the patient. She is to stop farxiga 3 days before procedure. It is okay to restart the medication once discharged and eating and drinking normally. Given her hyperglycemia recommend continuing current insulin regimen up until the day of the surgery. If hyperglycemic on the morning of the surgery she can give sliding scale corrections every 2 hours. Patient was agreeable to this plan.      A minimum of 72 hours of CGM data was reviewed and used to make therapy decisions.       PATIENT EDUCATION/GOALS    Goals  Fasting B - 130 mg/dL  Postprandial BG: less than 180 mg/dL  A1c: less than 7%    Type of encounter: [virtual]    Provided counseling on lifestyle modifications, medications, and self-monitoring. Patient has no additional questions at this time. Pharmacy to follow up in 6 weeks. Please reach out with any questions. Thank you.       Joanna Evans, PharmD    Provider on site: Jazz Mccall,  CNP  Continue all meds under the continuation of care with the referring provider and clinical pharmacy

## 2024-05-30 ENCOUNTER — APPOINTMENT (OUTPATIENT)
Dept: ENDOCRINOLOGY | Facility: CLINIC | Age: 49
End: 2024-05-30
Payer: MEDICAID

## 2024-05-30 PROCEDURE — RXMED WILLOW AMBULATORY MEDICATION CHARGE

## 2024-05-31 ENCOUNTER — APPOINTMENT (OUTPATIENT)
Dept: ENDOCRINOLOGY | Facility: CLINIC | Age: 49
End: 2024-05-31
Payer: MEDICAID

## 2024-05-31 ENCOUNTER — APPOINTMENT (OUTPATIENT)
Dept: OPHTHALMOLOGY | Facility: CLINIC | Age: 49
End: 2024-05-31
Payer: MEDICAID

## 2024-05-31 ENCOUNTER — PHARMACY VISIT (OUTPATIENT)
Dept: PHARMACY | Facility: CLINIC | Age: 49
End: 2024-05-31
Payer: MEDICAID

## 2024-06-02 ENCOUNTER — PATIENT MESSAGE (OUTPATIENT)
Dept: PRIMARY CARE | Facility: CLINIC | Age: 49
End: 2024-06-02
Payer: MEDICAID

## 2024-06-03 ENCOUNTER — DOCUMENTATION (OUTPATIENT)
Dept: PRIMARY CARE | Facility: CLINIC | Age: 49
End: 2024-06-03
Payer: MEDICAID

## 2024-06-03 ENCOUNTER — HOSPITAL ENCOUNTER (EMERGENCY)
Facility: HOSPITAL | Age: 49
Discharge: HOME | End: 2024-06-04
Attending: EMERGENCY MEDICINE
Payer: MEDICAID

## 2024-06-03 VITALS
SYSTOLIC BLOOD PRESSURE: 130 MMHG | DIASTOLIC BLOOD PRESSURE: 82 MMHG | RESPIRATION RATE: 16 BRPM | HEIGHT: 62 IN | TEMPERATURE: 97.2 F | OXYGEN SATURATION: 98 % | BODY MASS INDEX: 45.08 KG/M2 | WEIGHT: 245 LBS | HEART RATE: 107 BPM

## 2024-06-03 DIAGNOSIS — H57.9 EYE PROBLEM: Primary | ICD-10-CM

## 2024-06-03 PROCEDURE — 99284 EMERGENCY DEPT VISIT MOD MDM: CPT

## 2024-06-03 PROCEDURE — 2500000005 HC RX 250 GENERAL PHARMACY W/O HCPCS: Mod: SE

## 2024-06-03 RX ORDER — ONDANSETRON 4 MG/1
4 TABLET, FILM COATED ORAL ONCE
Status: COMPLETED | OUTPATIENT
Start: 2024-06-03 | End: 2024-06-03

## 2024-06-03 RX ADMIN — ONDANSETRON HYDROCHLORIDE 4 MG: 4 TABLET, FILM COATED ORAL at 20:56

## 2024-06-03 ASSESSMENT — LIFESTYLE VARIABLES
EVER HAD A DRINK FIRST THING IN THE MORNING TO STEADY YOUR NERVES TO GET RID OF A HANGOVER: NO
EVER FELT BAD OR GUILTY ABOUT YOUR DRINKING: NO
TOTAL SCORE: 0
HAVE PEOPLE ANNOYED YOU BY CRITICIZING YOUR DRINKING: NO
HAVE YOU EVER FELT YOU SHOULD CUT DOWN ON YOUR DRINKING: NO

## 2024-06-03 ASSESSMENT — PAIN SCALES - GENERAL: PAINLEVEL_OUTOF10: 8

## 2024-06-03 ASSESSMENT — PAIN DESCRIPTION - LOCATION: LOCATION: EYE

## 2024-06-03 ASSESSMENT — COLUMBIA-SUICIDE SEVERITY RATING SCALE - C-SSRS
2. HAVE YOU ACTUALLY HAD ANY THOUGHTS OF KILLING YOURSELF?: NO
6. HAVE YOU EVER DONE ANYTHING, STARTED TO DO ANYTHING, OR PREPARED TO DO ANYTHING TO END YOUR LIFE?: NO
1. IN THE PAST MONTH, HAVE YOU WISHED YOU WERE DEAD OR WISHED YOU COULD GO TO SLEEP AND NOT WAKE UP?: NO

## 2024-06-03 ASSESSMENT — PAIN - FUNCTIONAL ASSESSMENT: PAIN_FUNCTIONAL_ASSESSMENT: 0-10

## 2024-06-03 ASSESSMENT — PAIN DESCRIPTION - ORIENTATION: ORIENTATION: RIGHT

## 2024-06-03 ASSESSMENT — PAIN DESCRIPTION - PAIN TYPE: TYPE: ACUTE PAIN

## 2024-06-03 NOTE — ED TRIAGE NOTES
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

## 2024-06-04 PROCEDURE — 2500000001 HC RX 250 WO HCPCS SELF ADMINISTERED DRUGS (ALT 637 FOR MEDICARE OP): Mod: SE

## 2024-06-04 PROCEDURE — 2500000005 HC RX 250 GENERAL PHARMACY W/O HCPCS: Mod: SE

## 2024-06-04 RX ORDER — IBUPROFEN 600 MG/1
600 TABLET ORAL ONCE
Status: COMPLETED | OUTPATIENT
Start: 2024-06-04 | End: 2024-06-04

## 2024-06-04 RX ORDER — ONDANSETRON 4 MG/1
4 TABLET, FILM COATED ORAL ONCE
Status: COMPLETED | OUTPATIENT
Start: 2024-06-04 | End: 2024-06-04

## 2024-06-04 RX ADMIN — ONDANSETRON HYDROCHLORIDE 4 MG: 4 TABLET, FILM COATED ORAL at 03:27

## 2024-06-04 RX ADMIN — IBUPROFEN 600 MG: 600 TABLET, FILM COATED ORAL at 03:27

## 2024-06-04 NOTE — ED PROVIDER NOTES
HPI   Chief Complaint   Patient presents with    Eye Problem       Patient is a 48-year-old female with a past medical history significant for T2D, adrenal insufficiency, migraines, hypothyroidism, and immunodeficiency presenting to the ED with an eye problem.  Patient states she is already legally blind in her left eye.  She endorses a history of IIH and MS.  She states she was not a candidate to have a stent placed.  Patient states that she began to experience blurriness and black spots in the right eye yesterday.  She states these symptoms worsened into today and she now feels pressure behind the eye and feeling as though a needle was poking through the eye.  She denies any trauma or injury to the eye.  Patient also states she feels as though a curtain or shade is being pulled down over the right eye.  She endorses associated symptoms of lightheadedness and nausea.  Patient follows Dr. Mederos who is a neuro ophthalmologist here at .  She states she called his office and they told her to present to the ED.  Patient otherwise denies any headache, dizziness, or vomiting.              Jay Coma Scale Score: 15                     Patient History   Past Medical History:   Diagnosis Date    Abnormal findings on diagnostic imaging of other abdominal regions, including retroperitoneum 10/14/2020    Abnormal CT of the abdomen    Acquired deformity of nose 03/24/2022    Nasal deformity    Acute upper respiratory infection, unspecified 10/16/2019    Acute URI    Allergy status to unspecified drugs, medicaments and biological substances 05/22/2020    History of drug allergy    Allergy status to unspecified drugs, medicaments and biological substances 11/13/2020    History of adverse drug reaction    Benign intracranial hypertension 01/27/2022    Pseudotumor cerebri    Bipolar disorder, unspecified (Multi)     Bipolar disease, chronic    Breast calcification, right 08/21/2018    Cellulitis of abdominal wall 09/28/2022     Cellulitis of right abdominal wall    Cervicalgia 07/01/2020    Cervicalgia of [id number] region    Chronic maxillary sinusitis 01/04/2022    Chronic maxillary sinusitis    Chronic sialoadenitis 03/16/2020    Chronic sialoadenitis    COVID-19 01/06/2022    COVID-19 with multiple comorbidities    Decreased white blood cell count, unspecified 11/04/2019    Leukopenia    Disturbances of salivary secretion 03/16/2020    Xerostomia    Dry eye syndrome of bilateral lacrimal glands 10/07/2022    Dry eyes, bilateral    Encounter for preprocedural cardiovascular examination 02/01/2022    Preoperative cardiovascular examination    Food additives allergy status 06/11/2020    Allergy to food dye    Fracture of nasal bones, initial encounter for closed fracture 03/03/2022    Closed fracture of nasal bone, initial encounter    Granuloma of right orbit 10/07/2021    Inflammatory pseudotumor of right orbit    History of endometrial ablation 11/09/2017    Hyperlipidemia     Hypertension     Hyperthyroidism     Hypoglycemia     Hypothyroidism     Localized swelling, mass and lump, head 03/24/2022    Swollen nose    Major depressive disorder, recurrent, in full remission ([facility]) 10/07/2021    Depression, major, recurrent, in complete remission    Mammary duct ectasia of left breast 08/24/2022    Periductal mastitis of left breast    Nipple discharge 08/24/2022    Bloody discharge from left nipple    Ocular pain, right eye 10/07/2022    Pain in right eye    Other abnormal and inconclusive findings on diagnostic imaging of breast 07/06/2020    Other abnormal and inconclusive findings on diagnostic imaging of breast    Other anomalies of pupillary function 05/31/2019    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    Other chest pain 05/18/2020    Chest discomfort    Other conditions influencing health status 08/01/2022    History of cough    Other conditions influencing health status 08/03/2021    Chronic migraine     Other specified disorders of eustachian tube, left ear 11/18/2019    ETD (Eustachian tube dysfunction), left    Other specified disorders of nose and nasal sinuses 03/24/2022    Nasal dryness    Other specified disorders of nose and nasal sinuses 03/24/2022    Nasal crusting    Pelvic and perineal pain 07/06/2020    Pelvic pain    Personal history of other diseases of the circulatory system 04/07/2020    History of sinus tachycardia    Personal history of other diseases of the circulatory system 04/07/2020    History of abnormal electrocardiography    Personal history of other diseases of the circulatory system     History of Raynaud's syndrome    Personal history of other diseases of the digestive system     History of irritable bowel syndrome    Personal history of other diseases of the digestive system 03/02/2020    History of oral pain    Personal history of other diseases of the musculoskeletal system and connective tissue 01/19/2022    History of neck pain    Personal history of other diseases of the musculoskeletal system and connective tissue 03/02/2021    History of scleroderma    Personal history of other diseases of the musculoskeletal system and connective tissue 06/16/2020    History of muscle weakness    Personal history of other diseases of the nervous system and sense organs 11/18/2019    History of hearing loss    Personal history of other diseases of the nervous system and sense organs 09/21/2022    History of partial seizures    Personal history of other diseases of the respiratory system 04/14/2021    History of asthma    Personal history of other endocrine, nutritional and metabolic disease 02/17/2021    History of diabetes mellitus    Personal history of other mental and behavioral disorders 05/27/2021    History of anxiety    Personal history of other specified conditions 09/07/2022    History of nipple discharge    Personal history of other specified conditions 10/16/2019    History of  headache    Personal history of other specified conditions 09/28/2022    History of lump of left breast    Personal history of other specified conditions 09/16/2021    History of persistent cough    Personal history of other specified conditions 02/01/2022    History of palpitations    Personal history of other specified conditions 03/09/2022    History of headache    Personal history of other specified conditions 02/12/2014    History of chest pain    Personal history of other specified conditions 10/27/2021    History of nausea and vomiting    Personal history of other specified conditions 10/16/2019    History of fatigue    Personal history of other specified conditions 02/26/2021    History of orthopnea    Personal history of other specified conditions 02/22/2021    History of shortness of breath    Personal history of urinary calculi     H/O renal calculi    Polycystic ovary syndrome     Postural orthostatic tachycardia syndrome (POTS)     POTS (postural orthostatic tachycardia syndrome)    Rash and other nonspecific skin eruption 03/15/2022    Rash    Repeated falls 06/23/2021    Recurrent falls    Right lower quadrant pain 10/14/2020    Abdominal pain, RLQ (right lower quadrant)    Sjogren syndrome, unspecified (Multi)     History of Sjogren's disease    Slow transit constipation 07/09/2020    Slow transit constipation    Subarachnoid hemorrhage, traumatic (Multi) 04/19/2023    Thyroid nodule     Traumatic subarachnoid hemorrhage with loss of consciousness of unspecified duration, subsequent encounter 03/15/2022    Subarachnoid hemorrhage following injury, with loss of consciousness, subsequent encounter    Traumatic subarachnoid hemorrhage without loss of consciousness, subsequent encounter     Subarachnoid hemorrhage following injury, no loss of consciousness, subsequent encounter    Type 2 diabetes mellitus (Multi)     Unspecified disorder of refraction 10/07/2022    Refractive error    Unspecified optic  neuritis 11/06/2020    Right optic neuritis    Unspecified optic neuritis 11/06/2020    Optic neuritis, right    Unspecified visual loss 09/25/2019    Vision loss    Venous insufficiency (chronic) (peripheral) 10/18/2021    Chronic venous insufficiency of lower extremity    Viral infection, unspecified 01/11/2022    Nonspecific syndrome suggestive of viral illness    Vitamin D deficiency     Vitamin D deficiency, unspecified 09/28/2022    Vitamin D deficiency     Past Surgical History:   Procedure Laterality Date    APPENDECTOMY  2017    HERNIA REPAIR Right 03/01/2024    with mesh    HYSTERECTOMY  2017    MR HEAD ANGIO WO IV CONTRAST  02/08/2021    MR HEAD ANGIO WO IV CONTRAST 2/8/2021 [facility]    MR NECK ANGIO WO IV CONTRAST  02/08/2021    MR NECK ANGIO WO IV CONTRAST 2/8/2021 [facility]GAC[facility]    OTHER SURGICAL HISTORY  08/22/2019    Carpal tunnel surgery    OTHER SURGICAL HISTORY  08/22/2019    Hysterectomy    OTHER SURGICAL HISTORY  08/22/2019    Venous access port placement    OTHER SURGICAL HISTORY  08/22/2019    Cholecystectomy    OTHER SURGICAL HISTORY  08/22/2019    Appendectomy    OTHER SURGICAL HISTORY  08/22/2019    Pyloroplasty    WISDOM TOOTH EXTRACTION  2004     Family History   Problem Relation Name Age of Onset    Other (Perforated bowel) Mother      Hyperlipidemia Father Mac     Hypertension Father Mac     Heart attack Father Mac     Other (S/P CABG) Father Mac     Diabetes Father Mac     Prostate cancer Father Mac     Coronary artery disease Father Mac     Heart failure Father Mac     Stroke Father Mac     Stroke Sister Jazmin     Breast cancer Sister Jazimn     Lupus Sister Jazmin     Ovarian cancer Sister Jazmin     Hyperlipidemia Brother      Hypertension Brother      Diabetes Father's Sister Linda     Thyroid cancer Father's Sister Bekah     Thyroid disease Father's Sister Jessica     Colon cancer Father's Brother ?     Blindness Other Multiple     Melanoma Other       Asthma Other      Other (hay fever) Other      Allergies Other       Social History     Tobacco Use    Smoking status: Never    Smokeless tobacco: Never   Vaping Use    Vaping status: Never Used   Substance Use Topics    Alcohol use: Not Currently     Comment: Socially in College    Drug use: Yes     Types: Benzodiazepines       Physical Exam   ED Triage Vitals [06/03/24 1947]   Temperature Heart Rate Respirations BP   36.2 °C (97.2 °F) (!) 107 16 130/82      Pulse Ox Temp Source Heart Rate Source Patient Position   98 % Temporal Monitor Sitting      BP Location FiO2 (%)     Left arm 21 %       Physical Exam  Vitals reviewed.   Constitutional:       General: She is not in acute distress.     Appearance: She is not ill-appearing.   Eyes:      General: Lids are normal.      Extraocular Movements: Extraocular movements intact.      Right eye: No nystagmus.      Left eye: No nystagmus.      Conjunctiva/sclera: Conjunctivae normal.      Pupils: Pupils are unequal (Right pupil mildly dilated).      Comments: Patient reports pain with EOMs.  Bilateral pupils reactive to light.   Cardiovascular:      Rate and Rhythm: Regular rhythm. Tachycardia present.   Pulmonary:      Effort: Pulmonary effort is normal.      Breath sounds: Normal breath sounds.   Musculoskeletal:      Cervical back: Normal range of motion and neck supple.   Neurological:      General: No focal deficit present.      Mental Status: She is alert and oriented to person, place, and time.       ED Course & MDM   Diagnoses as of 06/04/24 1443   Eye problem       Medical Decision Making  Patient is a 48-year-old female with a past medical history significant for T2D, adrenal insufficiency, migraines, hypothyroidism, and immunodeficiency presenting to the ED with an eye problem.  History was obtained from the patient.  She endorses an extensive past medical history.  Is legally blind in her left eye and has a history of IIH and MS.  States she was not a candidate  for stent placement.  She began to then experience blurriness and black spots in the right eye yesterday.  Symptoms worsened today and now is feeling pain in/behind the eye.  Now feels as though his shade is being pulled down over the right eye.  Has some associated lightheadedness and nausea.  Denies any associated trauma or injury to the eye.  Follows Dr. Mederos who is a neuro-ophthalmologist here at .  His office told her to present to this ED.  On physical exam, patient is sitting comfortably and in no apparent distress.  Bilateral EOMs intact without nystagmus.  The right pupil does appear mildly dilated compared to the left.  Patient reports pain with EOMs.  Bilateral pupils round and reactive to light.  Neurologically intact without obvious deficit.  Full ROM of the neck.  Remainder of exam as noted above.  Patient is slightly tachycardic with a heart rate of 107.  Other vital signs stable.  Patient requested a dose of Zofran for which she was given.  A consult was then placed to the ophthalmology team for formal evaluation.  Please see the consult note for more details.  They ultimately deemed nothing structurally wrong with the right eye.  They did note patient has had previous episodic functional vision loss in the right eye.  They had no concern for more acute etiology.  They recommended patient continue her artificial tears as she has already been prescribed.  They will arrange close outpatient follow-up with Dr. Mederos in the coming days.  All of this was relayed to the patient who verbalized understanding.  She received doses of ibuprofen and Zofran prior to discharge.  Return precautions discussed.  She is agreeable to the plan and all of her questions were answered to satisfaction.        Procedure  Procedures     Raquel Pro PA-C  06/04/24 3092

## 2024-06-04 NOTE — CONSULTS
"Reason for consult: right eye floaters and blurry vision     HPI: 49yo female with history of left non-arteritic anterior ischemic optic neuropathy,  bilateral sequential vision loss with right eye improvement 11/13/2019, idiopathic intracranial hypertension, seizures, scleroderma, Sjogren, CVID on monthly IVIG, POTS, HTN, DM2, hypothyroidism, BRENT on CPAP, migraine presents for blurry vision and spots in the right eye.     She states she began noticing black spots in the right eye originally in May. These slowly resolved, however yesterday evening they returned along with blurry vision, pain with eye movements, and sharp pain behind the right eye. She also endorses a curtain over the right eye, but no new flashes. She does get flashes with migraines at times. She also endorses headaches for the past week. She endorses hearing a \"whooshing\" sound in the ears, but denies that it is pulsatile. She denies any solorzano ein vision with position change. She did have nausea, vomiting, and diarrhea a few days ago, but denies sick contacts or recent dietary changes.     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 11/13/2019, idiopathic intracranial hypertension   Past Medical History: as above  Family History: reviewed and not pertinent to chief complaint  Medications: please refer to medication reconciliation  Allergies: please refer to patient allergy list    Base Eye Exam       Visual Acuity (Snellen - Linear)         Right Left    Near cc 20/50 phni HM              Tonometry (Goldmann Applanation, 5:07 AM)         Right Left    Pressure 23 21              Pupils         Dark Light Shape React APD    Right 5 3 Round Brisk None    Left 5 3 Round Brisk +2 APD              Visual Fields    Left field - unable due to cooperation today   Right field - global constriction, worse superiorly              Extraocular Movement         Right " Left     Full Full              Neuro/Psych       Oriented x3: Yes                  Additional Tests       Color         Right Left    Ishihara 9/11 Unable                  Slit Lamp and Fundus Exam       External Exam         Right Left    External Normal Normal              Slit Lamp Exam         Right Left    Lids/Lashes Normal Normal    Conjunctiva/Sclera White and quiet White and quiet    Cornea Clear Clear    Anterior Chamber Deep and quiet Deep and quiet    Iris Round and reactive Round and reactive    Lens Clear Clear    Anterior Vitreous Normal Normal              Fundus Exam         Right Left    Disc Normal Pallor    C/D Ratio 0.15 0.15    Macula Normal Normal    Vessels Normal Normal    Periphery Normal Normal                     A&P:  #Floaters, right eye   #Hx of NAION, left eye  #Hx of IIH  - 47yo female with hx of left non-arteritic anterior ischemic optic neuropathy, bilateral sequential vision loss with right eye improvement 11/13/2019, idiopathic intracranial hypertension, seizures, scleroderma, Sjogren, CVID on monthly IVIG, POTS, HTN, DM2, hypothyroidism, BRENT on CPAP, migraine presenting for new floaters of the right eye.   - Entrance exam today with reduced visual acuity from 20/25 at last visit to 20/50 OD and OS from 20/200 to 20/HM. Pupillary exam with stable left APD. EOMI. VF with constriction OD, unable OS. IOP high normal OU. Color vision 9/11 OD and unable OS.   - Slit lamp exam today clear with no dry eye, no anterior chamber inflammation, negative Clarke's sign OU.   - Dilated fundus exam notable for stable optic nerve appearance with no optic disc edema appreciated. Scleral depressed exam completed with no tears, breaks, or holes.   - Vertical prism dissociation test revealed diplopia OU.   - Overall, there is no clear ocular etiology for the reduced right eye visual acuity in this pt with hx of left NAION and IIH. Upon chart review, pt did have reduced visual acuity without clear  cause in 2/2023 with VA OD 20/40 ph 20/25 OS 20/1000 ph 20/600. She has been noted to previously have episodic functional vision loss of the right eye. Of note, there is no concern for posterior vitreous detachment, retinal tear or detachment causing her subjective floaters and curtain sign. Additionally given absence of disc edema, there is no concern for NAION or optic neuritis of the right eye.     Recommendations:   - Will schedule close outpatient follow-up with neuro-ophthalmology  - Continue artificial tears as prescribed     Herminia Marie MD  Ophthalmology, PGY2    Note not final until signed by attending physician    Ophthalmology Adult Pager: [phone]  Ophthalmology Peds Pager: [phone]    For adult follow up appts, call [phone]  For pediatric follow up appts, call [phone]

## 2024-06-04 NOTE — DISCHARGE INSTRUCTIONS
Please continue using the artificial tears you have at home as needed.  The ophthalmology team did not note anything significant on your exam today.  Will work tomorrow on arranging close follow-up with Dr. Mederos.  You will be called and notified in the coming days that appointment is scheduled.

## 2024-06-05 ENCOUNTER — FOLLOW-UP (OUTPATIENT)
Dept: OPHTHALMOLOGY | Facility: CLINIC | Age: 49
End: 2024-06-05
Payer: MEDICAID

## 2024-06-05 ENCOUNTER — APPOINTMENT (OUTPATIENT)
Dept: RADIOLOGY | Facility: HOSPITAL | Age: 49
End: 2024-06-05
Payer: MEDICAID

## 2024-06-05 ENCOUNTER — HOSPITAL ENCOUNTER (EMERGENCY)
Facility: HOSPITAL | Age: 49
Discharge: HOME | End: 2024-06-05
Attending: EMERGENCY MEDICINE
Payer: MEDICAID

## 2024-06-05 ENCOUNTER — SPECIALTY PHARMACY (OUTPATIENT)
Dept: PHARMACY | Facility: CLINIC | Age: 49
End: 2024-06-05

## 2024-06-05 VITALS
HEART RATE: 90 BPM | BODY MASS INDEX: 45.08 KG/M2 | TEMPERATURE: 97.3 F | RESPIRATION RATE: 16 BRPM | OXYGEN SATURATION: 99 % | SYSTOLIC BLOOD PRESSURE: 141 MMHG | HEIGHT: 62 IN | DIASTOLIC BLOOD PRESSURE: 88 MMHG | WEIGHT: 245 LBS

## 2024-06-05 DIAGNOSIS — H46.11 OPTIC NEURITIS, RETROBULBAR (ACUTE), RIGHT: ICD-10-CM

## 2024-06-05 DIAGNOSIS — H54.61 VISION LOSS OF RIGHT EYE: Primary | ICD-10-CM

## 2024-06-05 DIAGNOSIS — H47.012 NAION (NON-ARTERITIC ANTERIOR ISCHEMIC OPTIC NEUROPATHY), LEFT EYE: ICD-10-CM

## 2024-06-05 DIAGNOSIS — H57.09 RELATIVE AFFERENT PUPILLARY DEFECT OF LEFT EYE: ICD-10-CM

## 2024-06-05 DIAGNOSIS — H47.20 OPTIC ATROPHY: Primary | ICD-10-CM

## 2024-06-05 DIAGNOSIS — G93.2 BENIGN INTRACRANIAL HYPERTENSION: ICD-10-CM

## 2024-06-05 PROCEDURE — 2500000004 HC RX 250 GENERAL PHARMACY W/ HCPCS (ALT 636 FOR OP/ED): Mod: SE

## 2024-06-05 PROCEDURE — A9575 INJ GADOTERATE MEGLUMI 0.1ML: HCPCS | Mod: SE | Performed by: EMERGENCY MEDICINE

## 2024-06-05 PROCEDURE — 99285 EMERGENCY DEPT VISIT HI MDM: CPT | Performed by: EMERGENCY MEDICINE

## 2024-06-05 PROCEDURE — 2500000001 HC RX 250 WO HCPCS SELF ADMINISTERED DRUGS (ALT 637 FOR MEDICARE OP): Mod: SE

## 2024-06-05 PROCEDURE — 2550000001 HC RX 255 CONTRASTS: Mod: SE | Performed by: EMERGENCY MEDICINE

## 2024-06-05 PROCEDURE — 70553 MRI BRAIN STEM W/O & W/DYE: CPT

## 2024-06-05 PROCEDURE — 96375 TX/PRO/DX INJ NEW DRUG ADDON: CPT | Mod: 59

## 2024-06-05 PROCEDURE — 92134 CPTRZ OPH DX IMG PST SGM RTA: CPT | Performed by: PSYCHIATRY & NEUROLOGY

## 2024-06-05 PROCEDURE — 96361 HYDRATE IV INFUSION ADD-ON: CPT

## 2024-06-05 PROCEDURE — 92083 EXTENDED VISUAL FIELD XM: CPT | Performed by: PSYCHIATRY & NEUROLOGY

## 2024-06-05 PROCEDURE — 70543 MRI ORBT/FAC/NCK W/O &W/DYE: CPT | Performed by: RADIOLOGY

## 2024-06-05 PROCEDURE — 70553 MRI BRAIN STEM W/O & W/DYE: CPT | Performed by: RADIOLOGY

## 2024-06-05 PROCEDURE — 99285 EMERGENCY DEPT VISIT HI MDM: CPT | Mod: 25

## 2024-06-05 PROCEDURE — 99215 OFFICE O/P EST HI 40 MIN: CPT | Performed by: PSYCHIATRY & NEUROLOGY

## 2024-06-05 PROCEDURE — 70543 MRI ORBT/FAC/NCK W/O &W/DYE: CPT

## 2024-06-05 PROCEDURE — 96374 THER/PROPH/DIAG INJ IV PUSH: CPT | Mod: 59

## 2024-06-05 RX ORDER — GADOTERATE MEGLUMINE 376.9 MG/ML
20 INJECTION INTRAVENOUS
Status: COMPLETED | OUTPATIENT
Start: 2024-06-05 | End: 2024-06-05

## 2024-06-05 RX ORDER — LORAZEPAM 0.5 MG/1
1 TABLET ORAL ONCE
Status: COMPLETED | OUTPATIENT
Start: 2024-06-05 | End: 2024-06-05

## 2024-06-05 RX ORDER — DIPHENHYDRAMINE HYDROCHLORIDE 50 MG/ML
25 INJECTION INTRAMUSCULAR; INTRAVENOUS ONCE
Status: COMPLETED | OUTPATIENT
Start: 2024-06-05 | End: 2024-06-05

## 2024-06-05 RX ORDER — CIPROFLOXACIN 500 MG/1
500 TABLET ORAL 2 TIMES DAILY
COMMUNITY
Start: 2024-06-02 | End: 2024-06-09

## 2024-06-05 RX ORDER — ACETAMINOPHEN 325 MG/1
975 TABLET ORAL ONCE
Status: COMPLETED | OUTPATIENT
Start: 2024-06-05 | End: 2024-06-05

## 2024-06-05 RX ORDER — ONDANSETRON HYDROCHLORIDE 2 MG/ML
4 INJECTION, SOLUTION INTRAVENOUS ONCE
Status: COMPLETED | OUTPATIENT
Start: 2024-06-05 | End: 2024-06-05

## 2024-06-05 RX ORDER — LACOSAMIDE 100 MG/1
250 TABLET ORAL ONCE
Status: COMPLETED | OUTPATIENT
Start: 2024-06-05 | End: 2024-06-05

## 2024-06-05 RX ORDER — KETOROLAC TROMETHAMINE 15 MG/ML
15 INJECTION, SOLUTION INTRAMUSCULAR; INTRAVENOUS ONCE
Status: COMPLETED | OUTPATIENT
Start: 2024-06-05 | End: 2024-06-05

## 2024-06-05 RX ORDER — FLUCONAZOLE 150 MG/1
TABLET ORAL
COMMUNITY
Start: 2024-05-31

## 2024-06-05 RX ORDER — LEVETIRACETAM 500 MG/1
1500 TABLET ORAL ONCE
Status: COMPLETED | OUTPATIENT
Start: 2024-06-05 | End: 2024-06-05

## 2024-06-05 RX ADMIN — LORAZEPAM 1 MG: 0.5 TABLET ORAL at 18:24

## 2024-06-05 RX ADMIN — DIPHENHYDRAMINE HYDROCHLORIDE 25 MG: 50 INJECTION, SOLUTION INTRAMUSCULAR; INTRAVENOUS at 17:23

## 2024-06-05 RX ADMIN — KETOROLAC TROMETHAMINE 15 MG: 15 INJECTION, SOLUTION INTRAMUSCULAR; INTRAVENOUS at 17:23

## 2024-06-05 RX ADMIN — ONDANSETRON 4 MG: 2 INJECTION INTRAMUSCULAR; INTRAVENOUS at 17:23

## 2024-06-05 RX ADMIN — LEVETIRACETAM 1500 MG: 500 TABLET, FILM COATED ORAL at 21:47

## 2024-06-05 RX ADMIN — LACOSAMIDE 250 MG: 100 TABLET, FILM COATED ORAL at 21:47

## 2024-06-05 RX ADMIN — SODIUM CHLORIDE, POTASSIUM CHLORIDE, SODIUM LACTATE AND CALCIUM CHLORIDE 1000 ML: 600; 310; 30; 20 INJECTION, SOLUTION INTRAVENOUS at 17:24

## 2024-06-05 RX ADMIN — GADOTERATE MEGLUMINE 20 ML: 376.9 INJECTION INTRAVENOUS at 19:34

## 2024-06-05 RX ADMIN — ACETAMINOPHEN 975 MG: 325 TABLET ORAL at 21:48

## 2024-06-05 ASSESSMENT — ENCOUNTER SYMPTOMS
NEUROLOGICAL NEGATIVE: 0
CONSTITUTIONAL NEGATIVE: 0
HEMATOLOGIC/LYMPHATIC NEGATIVE: 0
RESPIRATORY NEGATIVE: 0
PSYCHIATRIC NEGATIVE: 0
ALLERGIC/IMMUNOLOGIC NEGATIVE: 0
MUSCULOSKELETAL NEGATIVE: 0
CARDIOVASCULAR NEGATIVE: 0
EYES NEGATIVE: 1
GASTROINTESTINAL NEGATIVE: 0
ENDOCRINE NEGATIVE: 0

## 2024-06-05 ASSESSMENT — PAIN DESCRIPTION - LOCATION
LOCATION: EYE
LOCATION: HEAD

## 2024-06-05 ASSESSMENT — SLIT LAMP EXAM - LIDS
COMMENTS: NORMAL
COMMENTS: NORMAL

## 2024-06-05 ASSESSMENT — CONF VISUAL FIELD
OD_SUPERIOR_NASAL_RESTRICTION: 1
OS_INFERIOR_NASAL_RESTRICTION: 3
OD_INFERIOR_TEMPORAL_RESTRICTION: 0
OS_SUPERIOR_TEMPORAL_RESTRICTION: 3
OS_SUPERIOR_NASAL_RESTRICTION: 3
OD_INFERIOR_NASAL_RESTRICTION: 0
OS_INFERIOR_TEMPORAL_RESTRICTION: 3
METHOD: COUNTING FINGERS
OD_SUPERIOR_TEMPORAL_RESTRICTION: 1

## 2024-06-05 ASSESSMENT — EXTERNAL EXAM - RIGHT EYE: OD_EXAM: NORMAL

## 2024-06-05 ASSESSMENT — LIFESTYLE VARIABLES
EVER FELT BAD OR GUILTY ABOUT YOUR DRINKING: NO
TOTAL SCORE: 0
HAVE PEOPLE ANNOYED YOU BY CRITICIZING YOUR DRINKING: NO
HAVE YOU EVER FELT YOU SHOULD CUT DOWN ON YOUR DRINKING: NO
EVER HAD A DRINK FIRST THING IN THE MORNING TO STEADY YOUR NERVES TO GET RID OF A HANGOVER: NO

## 2024-06-05 ASSESSMENT — CUP TO DISC RATIO
OS_RATIO: 0.15
OD_RATIO: 0.15

## 2024-06-05 ASSESSMENT — PAIN SCALES - GENERAL
PAINLEVEL_OUTOF10: 7
PAINLEVEL_OUTOF10: 6

## 2024-06-05 ASSESSMENT — VISUAL ACUITY
CORRECTION_TYPE: GLASSES
METHOD: SNELLEN - LINEAR
OS_CC: 20/250
OD_CC: 20/30-2

## 2024-06-05 ASSESSMENT — COLUMBIA-SUICIDE SEVERITY RATING SCALE - C-SSRS
6. HAVE YOU EVER DONE ANYTHING, STARTED TO DO ANYTHING, OR PREPARED TO DO ANYTHING TO END YOUR LIFE?: NO
1. IN THE PAST MONTH, HAVE YOU WISHED YOU WERE DEAD OR WISHED YOU COULD GO TO SLEEP AND NOT WAKE UP?: NO
2. HAVE YOU ACTUALLY HAD ANY THOUGHTS OF KILLING YOURSELF?: NO

## 2024-06-05 ASSESSMENT — TONOMETRY
OS_IOP_MMHG: 18
IOP_METHOD: GOLDMANN APPLANATION
OD_IOP_MMHG: 20

## 2024-06-05 ASSESSMENT — EXTERNAL EXAM - LEFT EYE: OS_EXAM: NORMAL

## 2024-06-05 ASSESSMENT — PAIN - FUNCTIONAL ASSESSMENT: PAIN_FUNCTIONAL_ASSESSMENT: 0-10

## 2024-06-05 ASSESSMENT — PAIN DESCRIPTION - ORIENTATION: ORIENTATION: RIGHT

## 2024-06-05 ASSESSMENT — PAIN DESCRIPTION - PAIN TYPE: TYPE: ACUTE PAIN

## 2024-06-05 NOTE — ED PROVIDER NOTES
HPI   Chief Complaint   Patient presents with    Eye Problem       48-year-old female history of diabetes, adrenal insufficiency, hypothyroidism,  left nonarteritic anterior ischemic optic neuropathy, idiopathic intracranial hypertension, seizures, CVID on monthly IVIG, POTS, BRENT on CPAP, prior hemiplegic migraines presenting to the ED from ophthalmology clinic for evaluation of interval right eye vision worsening.  Patient sent into ED from ophthalmology clinic for MRI orbits with contrast for consideration of optic neuritis versus functional vision loss. Patient was seen on 6/3/2024 in ED for same complaint, endorsing blurriness and black spots in her vision progressive over the last month.  Continuing to describe interval right eye vision worsening and has new superior altitudinal vision loss of the right eye without correlating retinal or optic disc findings, has baseline poor left eye vision so relative a ferret pupillary defect is not possible to use for examination per optho notes.  Patient additionally stating she has had an ongoing migraine over the last week, feels similar to prior migraines, no sudden onset, some associated nausea, no recent falls or trauma.  Patient denies any additional complaints, stating she is here for MRI as recommended by ophthalmology.    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 11/13/2019, idiopathic intracranial hypertension                                               Jay Coma Scale Score: 15                     Patient History   Past Medical History:   Diagnosis Date    Abnormal findings on diagnostic imaging of other abdominal regions, including retroperitoneum 10/14/2020    Abnormal CT of the abdomen    Acquired deformity of nose 03/24/2022    Nasal deformity    Acute upper respiratory infection, unspecified 10/16/2019    Acute URI    Allergy status to unspecified drugs,  medicaments and biological substances 05/22/2020    History of drug allergy    Allergy status to unspecified drugs, medicaments and biological substances 11/13/2020    History of adverse drug reaction    Benign intracranial hypertension 01/27/2022    Pseudotumor cerebri    Bipolar disorder, unspecified (Multi)     Bipolar disease, chronic    Breast calcification, right 08/21/2018    Cellulitis of abdominal wall 09/28/2022    Cellulitis of right abdominal wall    Cervicalgia 07/01/2020    Cervicalgia of rhgipwrf-hnbxogn-anice region    Chronic maxillary sinusitis 01/04/2022    Chronic maxillary sinusitis    Chronic sialoadenitis 03/16/2020    Chronic sialoadenitis    COVID-19 01/06/2022    COVID-19 with multiple comorbidities    Decreased white blood cell count, unspecified 11/04/2019    Leukopenia    Disease of thyroid gland     Disturbances of salivary secretion 03/16/2020    Xerostomia    Dry eye syndrome of bilateral lacrimal glands 10/07/2022    Dry eyes, bilateral    Encounter for preprocedural cardiovascular examination 02/01/2022    Preoperative cardiovascular examination    Food additives allergy status 06/11/2020    Allergy to food dye    Fracture of nasal bones, initial encounter for closed fracture 03/03/2022    Closed fracture of nasal bone, initial encounter    Granuloma of right orbit 10/07/2021    Inflammatory pseudotumor of right orbit    History of endometrial ablation 11/09/2017    Hyperlipidemia     Hypertension     Hyperthyroidism     Hypoglycemia     Hypothyroidism     Localized swelling, mass and lump, head 03/24/2022    Swollen nose    Major depressive disorder, recurrent, in full remission (CMS-HCC) 10/07/2021    Depression, major, recurrent, in complete remission    Mammary duct ectasia of left breast 08/24/2022    Periductal mastitis of left breast    Migraine     Nipple discharge 08/24/2022    Bloody discharge from left nipple    Ocular pain, right eye 10/07/2022    Pain in right eye     Optic atrophy     Other abnormal and inconclusive findings on diagnostic imaging of breast 07/06/2020    Other abnormal and inconclusive findings on diagnostic imaging of breast    Other anomalies of pupillary function 05/31/2019    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    Other chest pain 05/18/2020    Chest discomfort    Other conditions influencing health status 08/01/2022    History of cough    Other conditions influencing health status 08/03/2021    Chronic migraine    Other specified disorders of eustachian tube, left ear 11/18/2019    ETD (Eustachian tube dysfunction), left    Other specified disorders of nose and nasal sinuses 03/24/2022    Nasal dryness    Other specified disorders of nose and nasal sinuses 03/24/2022    Nasal crusting    Pelvic and perineal pain 07/06/2020    Pelvic pain    Personal history of other diseases of the circulatory system 04/07/2020    History of sinus tachycardia    Personal history of other diseases of the circulatory system 04/07/2020    History of abnormal electrocardiography    Personal history of other diseases of the circulatory system     History of Raynaud's syndrome    Personal history of other diseases of the digestive system     History of irritable bowel syndrome    Personal history of other diseases of the digestive system 03/02/2020    History of oral pain    Personal history of other diseases of the musculoskeletal system and connective tissue 01/19/2022    History of neck pain    Personal history of other diseases of the musculoskeletal system and connective tissue 03/02/2021    History of scleroderma    Personal history of other diseases of the musculoskeletal system and connective tissue 06/16/2020    History of muscle weakness    Personal history of other diseases of the nervous system and sense organs 11/18/2019    History of hearing loss    Personal history of other diseases of the nervous system and sense organs 09/21/2022    History of partial  seizures    Personal history of other diseases of the respiratory system 04/14/2021    History of asthma    Personal history of other endocrine, nutritional and metabolic disease 02/17/2021    History of diabetes mellitus    Personal history of other mental and behavioral disorders 05/27/2021    History of anxiety    Personal history of other specified conditions 09/07/2022    History of nipple discharge    Personal history of other specified conditions 10/16/2019    History of headache    Personal history of other specified conditions 09/28/2022    History of lump of left breast    Personal history of other specified conditions 09/16/2021    History of persistent cough    Personal history of other specified conditions 02/01/2022    History of palpitations    Personal history of other specified conditions 03/09/2022    History of headache    Personal history of other specified conditions 02/12/2014    History of chest pain    Personal history of other specified conditions 10/27/2021    History of nausea and vomiting    Personal history of other specified conditions 10/16/2019    History of fatigue    Personal history of other specified conditions 02/26/2021    History of orthopnea    Personal history of other specified conditions 02/22/2021    History of shortness of breath    Personal history of urinary calculi     H/O renal calculi    Polycystic ovary syndrome     Postural orthostatic tachycardia syndrome (POTS)     POTS (postural orthostatic tachycardia syndrome)    Rash and other nonspecific skin eruption 03/15/2022    Rash    Repeated falls 06/23/2021    Recurrent falls    Right lower quadrant pain 10/14/2020    Abdominal pain, RLQ (right lower quadrant)    Sjogren syndrome, unspecified (Multi)     History of Sjogren's disease    Sjogren's syndrome (Multi)     Slow transit constipation 07/09/2020    Slow transit constipation    Subarachnoid hemorrhage, traumatic (Multi) 04/19/2023    Thyroid nodule      Traumatic subarachnoid hemorrhage with loss of consciousness of unspecified duration, subsequent encounter 03/15/2022    Subarachnoid hemorrhage following injury, with loss of consciousness, subsequent encounter    Traumatic subarachnoid hemorrhage without loss of consciousness, subsequent encounter     Subarachnoid hemorrhage following injury, no loss of consciousness, subsequent encounter    Type 2 diabetes mellitus (Multi)     Unspecified disorder of refraction 10/07/2022    Refractive error    Unspecified optic neuritis 11/06/2020    Right optic neuritis    Unspecified optic neuritis 11/06/2020    Optic neuritis, right    Unspecified visual loss 09/25/2019    Vision loss    Venous insufficiency (chronic) (peripheral) 10/18/2021    Chronic venous insufficiency of lower extremity    Viral infection, unspecified 01/11/2022    Nonspecific syndrome suggestive of viral illness    Vitamin D deficiency     Vitamin D deficiency, unspecified 09/28/2022    Vitamin D deficiency     Past Surgical History:   Procedure Laterality Date    APPENDECTOMY  2017    HERNIA REPAIR Right 03/01/2024    with mesh    HYSTERECTOMY  2017    MR HEAD ANGIO WO IV CONTRAST  02/08/2021    MR HEAD ANGIO WO IV CONTRAST 2/8/2021 [facility]    MR NECK ANGIO WO IV CONTRAST  02/08/2021    MR NECK ANGIO WO IV CONTRAST 2/8/2021 [facility]    OTHER SURGICAL HISTORY  08/22/2019    Carpal tunnel surgery    OTHER SURGICAL HISTORY  08/22/2019    Hysterectomy    OTHER SURGICAL HISTORY  08/22/2019    Venous access port placement    OTHER SURGICAL HISTORY  08/22/2019    Cholecystectomy    OTHER SURGICAL HISTORY  08/22/2019    Appendectomy    OTHER SURGICAL HISTORY  08/22/2019    Pyloroplasty    WISDOM TOOTH EXTRACTION  2004     Family History   Problem Relation Name Age of Onset    Other (Perforated bowel) Mother Radha     Hypertension Mother Radha     Macular degeneration Mother Radha     Hyperlipidemia Father Mac     Hypertension  Father Mac     Heart attack Father Mac     Other (S/P CABG) Father Mac     Diabetes Father Mac     Prostate cancer Father Mac     Coronary artery disease Father Mac     Heart failure Father Mac     Stroke Father Mac     Cancer Father Mac     Macular degeneration Father Mac     Retinal detachment Father Mac     Stroke Sister Jazmin     Breast cancer Sister Jazmin     Lupus Sister Jazmin     Ovarian cancer Sister Jazmin     Astigmatism Sister Jazmin     Hyperlipidemia Brother Sanchez     Hypertension Brother Sanchez     Astigmatism Brother Sanchez     Diabetes Father's Sister Linda     Blindness Father's Sister Linda     Thyroid cancer Father's Sister Bekah     Thyroid disease Father's Sister Jessica     Colon cancer Father's Brother ?     Blindness Other Multiple     Melanoma Other      Asthma Other      Other (hay fever) Other      Allergies Other      Cancer Maternal Grandmother Brenda     Cancer Father's Brother Kian      Social History     Tobacco Use    Smoking status: Never    Smokeless tobacco: Never   Vaping Use    Vaping status: Never Used   Substance Use Topics    Alcohol use: Not Currently     Comment: Socially in College    Drug use: Yes     Types: Benzodiazepines       Physical Exam   ED Triage Vitals [06/05/24 1606]   Temperature Heart Rate Respirations BP   36.3 °C (97.3 °F) 90 16 141/88      Pulse Ox Temp Source Heart Rate Source Patient Position   99 % Skin Monitor Sitting      BP Location FiO2 (%)     Right arm --       Physical Exam  Vitals and nursing note reviewed.   Constitutional:       General: She is not in acute distress.     Appearance: Normal appearance. She is not ill-appearing, toxic-appearing or diaphoretic.      Comments: Wearing sunglasses   HENT:      Head: Normocephalic and atraumatic.      Right Ear: External ear normal.      Left Ear: External ear normal.      Nose: Nose normal.      Mouth/Throat:      Mouth: Mucous membranes are moist.      Pharynx: Oropharynx is clear.   Eyes:       Extraocular Movements: Extraocular movements intact.      Conjunctiva/sclera: Conjunctivae normal.      Comments: Bilateral eyes dilated from ophthalmology exam prior to arrival, no conjunctival injection, pupils equal in size.   Cardiovascular:      Rate and Rhythm: Normal rate and regular rhythm.      Pulses: Normal pulses.   Pulmonary:      Effort: Pulmonary effort is normal. No respiratory distress.   Musculoskeletal:      Cervical back: Normal range of motion and neck supple. No rigidity.      Right lower leg: No edema.      Left lower leg: No edema.   Skin:     General: Skin is warm and dry.      Capillary Refill: Capillary refill takes less than 2 seconds.      Findings: No rash.   Neurological:      General: No focal deficit present.      Mental Status: She is alert and oriented to person, place, and time. Mental status is at baseline.      Cranial Nerves: No cranial nerve deficit.      Sensory: No sensory deficit.      Motor: No weakness.      Coordination: Coordination normal.      Gait: Gait normal.   Psychiatric:         Mood and Affect: Mood normal.         Behavior: Behavior normal.         ED Course & MDM   ED Course as of 06/05/24 2245 Wed Jun 05, 2024 2042 MR orbit w and wo IV contrast  MRI orbit showing no acute abnormality of the right optic nerve, left optic nerve atrophy unchanged from previous MRI.  No acute intracranial abnormality, right frontal lobe encephalomalacia/gliosis similar to prior MRI. No intracranial ischemia or abnormal enhancement. [KR]   2235 Ophthalmology reviewed imaging, no current concern for optic neuritis based on exam. 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  Patient seen today by Dr. Mederos with neuro-ophthalmology endorsing blurry vision, pain with eye movements, sharp pain behind right eye with endorsement of a curtain over the right eye. DDx to include immediately retrobulbar lesions such as with optic neuritis, main alternative considered is dysfunctional vision loss.  No optic disc edema on outpatient ophthalmology exam today to raise concerns for idiopathic intracranial hypertension or nonarteritic anterior ischemic optic neuropathy affecting the right eye, left eye exam was stable and consistent with prior nonarteritic anterior ischemic optic neuropathy.  Also considered migraine as potential source of symptoms, no meningeal signs, fever with low suspicion for CSF infection.  Patient ambulatory with steady gait, no focal neurologic deficits or findings concerning for TIA/stroke.  Treated with migraine cocktail with some symptomatic improvement, already follows with neurology outpatient for migraines.  MRI of the orbit and brain showing no acute abnormalities, stable chronic findings.  Ophthalmology reviewed imaging and examination from today, no concern for optic neuritis at this time, suspect functional vision loss.  Patient is to follow-up outpatient with neuro-ophthalmology and has upcoming appointment with her neurologist.  Discharged with return precautions discussed.    Amount and/or Complexity of Data Reviewed  External Data Reviewed:      Details: 11/07/2023 MRI brain without contrast, by report from [facility], shows \"No acute intracranial abnormality including no evidence of an acute or recent brain parenchymal infarct. \"    11/07/2023 CTA head & neck & CT head without contrast, by report from [facility], show \"No acute abnormality of the cervical and intracranial vasculature.\" And \"No evidence of an acute " intracranial process.             Procedure  Procedures     Colleen Lim DO  Resident  06/05/24 1899

## 2024-06-05 NOTE — Clinical Note
Jonathan Ayala was seen and treated in our emergency department on 6/5/2024.  She may return to work on 06/06/2024.       If you have any questions or concerns, please don't hesitate to call.      Colleen Lim, DO

## 2024-06-05 NOTE — PROGRESS NOTES
"Assessment and Plan    06/08/2021 +OKN response OD  09/30/2019 +OKN response OD    11/07/2023 MRI brain without contrast, by report from [facility], shows \"No acute intracranial abnormality including no evidence of an acute or recent brain parenchymal infarct. \"  11/07/2023 CTA head & neck & CT head without contrast, by report from [facility], show \"No acute abnormality of the cervical and intracranial vasculature.\" And \"No evidence of an acute intracranial process.     Focal RIGHT frontal lobe encephalomalacia deep to a sherley hole, new from   02/25/2020. \"  08/01/2023 MRV head, which I personally reviewed previously, shows no lesion.  07/29/2023 MRI brain & orbits with contrast, which I personally reviewed previously, shows right frontal encephalomalacia consistent with prior bolt.  Interval head imaging.  10/05/2022 CT head without contrast & CTA head & neck, by report from [place], show \" 1.   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  Overlying right-sided sherley hole.)  2.  Prominence of the ventricles and sulci for age.  \" and \"1. Similar appearing old areas of infarction involving the right and left frontal lobes extending to the convexities with underlying encephalomalacia at site of prior hemorrhage from 02/13/2022. Overlying right-sided sherley hole. Prominence of the ventricles and sulci for age. No evidence of acute infarction. No active hemorrhage. 2. No significant stenosis internal carotid arteries. 3. No significant stenosis of the intracranial vessels or evidence of aneurysm. 4. Aberrant right subclavian artery behind the esophagus with no dilation of the proximal esophagus.\"  09/25/2022 CT head without contrast, which I personally reviewed previously, shows no lesion.  04/20/2022 CT head without contrast, which I personally reviewed " previously, shows right frontal encephalomalacia judged stable by Radiology.  Interval head imaging.  09/10/2021 MRI brain with contrast, which I personally reviewed previously, shows no lesion.  09/09/2021 CTA head & neck, which I personally reviewed previously, shows no lesion.  09/09/2021 CT head without contrast, which I personally reviewed previously, shows no lesion.  08/11/2021 MRI brain & orbits with contrast & MRV head, which I personally reviewed previously, show left optic atrophy with Radiology noting “Punctate focus of enhancement seen along the left 7th-8th cranial nerve bundle at the level of the porus acusticus, indeterminate. Otherwise unremarkable MRI of the brain.”  Interval head imaging.  06/04/2021 MRI brain & orbits with contrast, which I personally reviewed previously, shows left optic atrophy.  Interval head imaging.  06/29/2017 MRI brain without contrast, which I personally reviewed previously, shows no lesion.  11/22/2007 CT head without contrast, which I personally reviewed previously, shows no lesion.    08/31/2023 lumbar puncture tube 1: , WBC 0, tube 4: RBC 6 & WBC 0, protein 91 & glucose 71.  08/11/2021 lumbar puncture opening pressure 18 cm water, tube 1: RBC 0, WBC 16 (86% L, 13% M), tube 4: RBC 0 & WBC 16 (92% L, 8% M), protein 71 & glucose 61. Cytology negative. Flow cytometry negative. Oligoclonal bands 0. IgG studies with high CSF IgG & albumin.  08/05/2020 lumbar puncture opening pressure 20 cm water, protein 68, glucose 85. MBP wnl. Oligoclonal bands POSITIVE 4.  12/26/2019 lumbar puncture no opening pressure checked per patient) tube ?: RBC 39, WBC 6 (91% L, 9% M), tube ?: RBC 38, WBC 5, protein 89, glucose 79. (via [employer])  11/13/2019 lumbar puncture opening pressure 22 cm water, tube 1: RBC 9, WBC 4, tube 4: RBC 1 & WBC 5, protein 82 & glucose 61. Oligoclonal bands 0. IgG studies with non-specific abnormalities. HSV PCR negative.  09/20/2019 lumbar  puncture opening pressure 20 cm water, RBC 1, WBC 7 (96% L, 4% M), protein 94 & glucose 78.  01/31/2015 lumbar puncture RBC 2, WBC 0, protein 104 & glucose 74.    07/27/2019 multifocal ERG with mildly reduced central responses.  Pattern VEP with OD borderline latency at 0.25 degrees and OS with severely prolonged latencies and decreased amplitudes.    Lab Results   Component Value Date/Time    SEDRATE 19 08/01/2023 1558    SEDRATE 33 (H) 08/07/2022 0322    SEDRATE 19 05/25/2022 1501    SEDRATE 3 06/05/2020 1110    SEDRATE 6 05/13/2020 1529    SEDRATE 3 03/28/2020 0629    SEDRATE 5 09/16/2019 0507    SEDRATE 8 08/19/2019 1314    CRP 0.90 08/07/2022 0322    CRP 0.35 05/25/2022 1501    CRP 0.34 09/23/2021 0618    CRP 0.71 09/21/2021 0648    CRP 0.14 07/16/2020 0944    CRP 0.10 06/05/2020 1110    CRP <0.10 05/13/2020 1529    CRP <0.10 03/28/2020 0629    CRP 3.64 (A) 11/21/2019 2157    CRP <0.10 08/19/2019 1314      Lab Results   Component Value Date/Time    CKUSJAFV26 299 02/23/2023 0941    WXPHPLLQ23 709 02/09/2021 0451    VGJZYILN76 508 12/10/2019 1349    RCFOL 951 12/10/2019 1349      Lab Results   Component Value Date/Time    NMOAQP4 Negative 11/14/2019 1447    MOGFACS Negative 11/10/2020 1000    MOGFACS Negative 11/14/2019 1447    ACE 26 11/10/2020 1000      Tuberculosis studies  Lab Results   Component Value Date/Time    TBSIN Negative 09/22/2021 0430    TBSIN Negative 11/10/2020 1000    TBSIN Negative 11/14/2019 0531      12/04/2023 ESR 10. CRP <0.3 mg/dL.  10/01/2023 syphilis non-reactive (NR).  09/18/2020 ESR 1. CRP < 0.08 mg/dL (0.8 mg/L).  08/28/2020 HbA1c HIGH 7.0. Lipid panel with LDL 64.  08/05/2020 B12 462. Folate wnl. AQP4 ab negative.  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 Suarez.  07/09/2019 BRETT > 1:640. Anti-centromere HIGH 101 (0-40). scl-70 negative. Chromatin ab IgG, anti-ribosomal ab, anti-Sarahy ab, anti-DNA ab negative.    06/05/2024 OCT RNFL OD 95 & OS 40.  (Stable)  OCT macula OD normal foveal contour 264 & OS normal foveal contour 234.  03/15/2024 OCT RNFL OD 92 & OS 36. (Stable)  01/09/2024 OCT RNFL OD 89 & OS 36. (Stable)  02/06/2023 OCT RNFL OD 92 & OS 38. (stable)  OCT macula OD normal foveal contour 255 & OS thinning RNFL miscentered wrt fovea.  10/07/2022 OCT RNFL OD 92 & OS 40. (decreased OD & stable OS)  09/23/2022 OCT RNFL OD 98 & OS 38. (increased OD & stable OS)  01/27/2022 OCT RNFL OD 88 & OS 37. (stable)  10/06/2021 OCT RNFL OD 93 & OS 39 (stable)..  08/19/2021 OCT RNFL OD 92 & OS 38. (stable)  06/08/2021 OCT RNFL OD 87 & OS 37. (stable)  01/28/2021 OCT RNFL OD 85 & OS 37. (stable)  11/06/2020 OCT RNFL OD 89 & OS 39. (stable)  07/27/2020 OCT RNFL OD 89 & OS 38. (stable)  01/07/2020 OCT RNFL OD 93 & OS 38. (stable to mild increase OD, stable to mild decrease OS)  11/27/2019 OCT RNFL OD 84 & OS 42. (stable to mild OD decrease)  09/30/2019 OCT RNFL OD 89 & OS 41. (stable)  07/18/2019 OCT RNFL OD 90 & OS 39.  OCT macula OD normal foveal contour 256 & OS thinning of RNFL & GCL, but preserved foveal contour 238.    06/05/2024 HVF 24-2 OD fovea 34, FL 2/16, FP 0%, FN 40%, superior altitudinal MD -18.00 & OS stimulus V, fovea 25, generalized depression.  03/15/2024 HVF 24-2 OD fovea 36, wnl MD -1.05 & OS stimulus V, fovea 3, inferior arcuate > superior arcuate.  01/09/2024 HVF 24-2 OD wnl MD -0.94 & OS generalized depression MD -32.10.  02/06/2023 HVF 24-2 OD fovea 36, FL 1/17, FP 0%< FN 15%, scatter MD -7.58 & OS stimulus V, fovea 19, generalized depression.  10/07/2022 HVF 24-2 OD fovea 35, wnl MD -1.26 & OS fovea 5, generalized depression MD -30.62.  09/23/2022 HVF 24-2 OD fovea 38, scatter MD -2.67 & OS stimulus V, fovea 16, superior arcuate & inferior arcuate.  01/27/2022 HVF 24-2 OD fovea 36, wnl MD -1.67 & OS stimulus V, fovea 28, generalized reduction.  10/06/2021 HVF 24-2 OD fovea 36, scatter MD -4.76 & OS stimulus V, generalized reduction.  08/19/2021  HVF 24-2 OD fovea 39, wnl MD -2.31 & OS stimulus V, fovea 28, generalized depression.  06/08/2021 HVF Stimulus V OD fovea 34, wnl & OS stimulus V, fovea 24, generalized depression.  01/28/2021 HVF 24-2 OD fovea 40, wnl MD -0.63 & OS stimulus V, fovea 28, superior nasal step & inferior arcuate.  11/06/2020 HVF 24-2 OD fovea 32, possible inferior > superior arcuate MD -14.71 & OS stimulus V, fovea 10, generalized depression.  07/27/2020 HVF 24-2 OD fovea 39, wnl MD -2.45 & OS stimulus V, fovea 27, superior & inferior altitudinal.    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    She has new superior altitudinal vision loss of the right eye. I do not see correlating retinal or optic disc findings. With baseline poor left eye vision, relative afferent pupillary defect is not possible to use.    The main consideration is an immediately retrobulbar lesion such as with optic neuritis. The main alterative given her history is functional vision loss. I do not see optic disc edema to raise concerns for idiopathic intracranial hypertension or non-arteritic anterior ischemic optic neuropathy affecting the right eye.    The left eye remains stable and consistent with prior non-arteritic anterior ischemic optic neuropathy.    We discussed MRI and options of Emergency Department versus STAT outpatient.    Plan    Report to [facility] Emergency Department.  Check MRI orbits with contrast.  Decide on admission for optic neuritis versus discharge and outpatient follow up for functional vision loss based on results.  Continue with Dr. Bai for headache  treatment.  Vasculopathic risk factor control.  Continue off acetazolamide.    Follow up in with HVF & OCT. (dilated 6/5/2024)

## 2024-06-06 NOTE — DISCHARGE INSTRUCTIONS
You were evaluated after being sent in by ophthalmology.  You had MRIs of your brain and eye obtaining but not show any new abnormalities.  You should follow-up with Dr. Mederos as scheduled.  Please return for any worsening symptoms.

## 2024-06-07 ENCOUNTER — APPOINTMENT (OUTPATIENT)
Dept: ENDOCRINOLOGY | Facility: CLINIC | Age: 49
End: 2024-06-07
Payer: MEDICAID

## 2024-06-17 ENCOUNTER — APPOINTMENT (OUTPATIENT)
Dept: PRIMARY CARE | Facility: CLINIC | Age: 49
End: 2024-06-17
Payer: MEDICAID

## 2024-06-18 ENCOUNTER — OFFICE VISIT (OUTPATIENT)
Dept: PRIMARY CARE | Facility: CLINIC | Age: 49
End: 2024-06-18
Payer: MEDICAID

## 2024-06-18 VITALS
HEART RATE: 98 BPM | HEIGHT: 62 IN | WEIGHT: 251.4 LBS | BODY MASS INDEX: 46.26 KG/M2 | SYSTOLIC BLOOD PRESSURE: 114 MMHG | DIASTOLIC BLOOD PRESSURE: 78 MMHG | OXYGEN SATURATION: 96 %

## 2024-06-18 DIAGNOSIS — E88.9: ICD-10-CM

## 2024-06-18 DIAGNOSIS — J45.40 MODERATE PERSISTENT ALLERGIC ASTHMA (HHS-HCC): ICD-10-CM

## 2024-06-18 DIAGNOSIS — D83.9 CVID (COMMON VARIABLE IMMUNODEFICIENCY) (MULTI): ICD-10-CM

## 2024-06-18 DIAGNOSIS — E11.43 DIABETIC GASTROPARESIS ASSOCIATED WITH TYPE 2 DIABETES MELLITUS (MULTI): ICD-10-CM

## 2024-06-18 DIAGNOSIS — S82.51XA CLOSED AVULSION FRACTURE OF MEDIAL MALLEOLUS OF RIGHT TIBIA, INITIAL ENCOUNTER: ICD-10-CM

## 2024-06-18 DIAGNOSIS — Z79.899 POLYPHARMACY: ICD-10-CM

## 2024-06-18 DIAGNOSIS — G93.2: ICD-10-CM

## 2024-06-18 DIAGNOSIS — I10 BENIGN ESSENTIAL HYPERTENSION: ICD-10-CM

## 2024-06-18 DIAGNOSIS — T39.95XA ANALGESIC REBOUND HEADACHE: ICD-10-CM

## 2024-06-18 DIAGNOSIS — F33.42 MAJOR DEPRESSIVE DISORDER, RECURRENT, IN FULL REMISSION WITH ANXIOUS DISTRESS (CMS-HCC): ICD-10-CM

## 2024-06-18 DIAGNOSIS — B37.31 VAGINAL MONILIASIS: ICD-10-CM

## 2024-06-18 DIAGNOSIS — G44.40 ANALGESIC REBOUND HEADACHE: ICD-10-CM

## 2024-06-18 DIAGNOSIS — G43.109 COMPLICATED MIGRAINE: ICD-10-CM

## 2024-06-18 DIAGNOSIS — K31.84 DIABETIC GASTROPARESIS ASSOCIATED WITH TYPE 2 DIABETES MELLITUS (MULTI): ICD-10-CM

## 2024-06-18 DIAGNOSIS — E66.01 CLASS 3 SEVERE OBESITY DUE TO EXCESS CALORIES WITH SERIOUS COMORBIDITY AND BODY MASS INDEX (BMI) OF 40.0 TO 44.9 IN ADULT (MULTI): Primary | ICD-10-CM

## 2024-06-18 PROBLEM — G40.909 SEIZURE DISORDER (MULTI): Status: ACTIVE | Noted: 2024-06-18

## 2024-06-18 PROBLEM — G44.019 EPISODIC CLUSTER HEADACHE, NOT INTRACTABLE: Status: RESOLVED | Noted: 2017-09-11 | Resolved: 2024-06-18

## 2024-06-18 PROCEDURE — 1036F TOBACCO NON-USER: CPT | Performed by: INTERNAL MEDICINE

## 2024-06-18 PROCEDURE — 99215 OFFICE O/P EST HI 40 MIN: CPT | Performed by: INTERNAL MEDICINE

## 2024-06-18 PROCEDURE — 3074F SYST BP LT 130 MM HG: CPT | Performed by: INTERNAL MEDICINE

## 2024-06-18 PROCEDURE — 3049F LDL-C 100-129 MG/DL: CPT | Performed by: INTERNAL MEDICINE

## 2024-06-18 PROCEDURE — 3051F HG A1C>EQUAL 7.0%<8.0%: CPT | Performed by: INTERNAL MEDICINE

## 2024-06-18 PROCEDURE — 3078F DIAST BP <80 MM HG: CPT | Performed by: INTERNAL MEDICINE

## 2024-06-18 PROCEDURE — 3008F BODY MASS INDEX DOCD: CPT | Performed by: INTERNAL MEDICINE

## 2024-06-18 RX ORDER — AMITRIPTYLINE HYDROCHLORIDE 50 MG/1
50 TABLET, FILM COATED ORAL NIGHTLY
Qty: 30 TABLET | Refills: 11 | Status: SHIPPED | OUTPATIENT
Start: 2024-06-18 | End: 2025-06-18

## 2024-06-18 RX ORDER — PROPRANOLOL HYDROCHLORIDE 60 MG/1
60 CAPSULE, EXTENDED RELEASE ORAL DAILY
Qty: 30 CAPSULE | Refills: 11 | Status: SHIPPED | OUTPATIENT
Start: 2024-06-18 | End: 2025-06-18

## 2024-06-18 ASSESSMENT — ENCOUNTER SYMPTOMS
WEAKNESS: 1
LIGHT-HEADEDNESS: 1
HEADACHES: 1
FATIGUE: 1
DIZZINESS: 1
NECK PAIN: 1

## 2024-06-18 ASSESSMENT — PATIENT HEALTH QUESTIONNAIRE - PHQ9
1. LITTLE INTEREST OR PLEASURE IN DOING THINGS: NOT AT ALL
SUM OF ALL RESPONSES TO PHQ9 QUESTIONS 1 AND 2: 0
2. FEELING DOWN, DEPRESSED OR HOPELESS: NOT AT ALL

## 2024-06-18 NOTE — ASSESSMENT & PLAN NOTE
Discontinue melatonin discontinue Tylenol ibuprofen change propranolol 10 mg 3 times a day to extended release propranolol 80 mg daily work on reducing preventative migraine medications not working at all may have developed tolerance discussed with neurologist reducing medication burden

## 2024-06-18 NOTE — PROGRESS NOTES
"Subjective   Patient ID: Jonathan Ayala is a 48 y.o. female who presents for Follow-up.    HPI     Review of Systems    Objective   /78   Pulse 98   Ht 1.575 m (5' 2\")   Wt 114 kg (251 lb 6.4 oz) Comment: weighed with boot on  SpO2 96%   BMI 45.98 kg/m²     Physical Exam    Assessment/Plan          "

## 2024-06-18 NOTE — PROGRESS NOTES
"Subjective   Reason for Visit: Jonathan Ayala is an 48 y.o. female here for a Medicare Wellness visit.     Past Medical, Surgical, and Family History reviewed and updated in chart.    Reviewed all medications by prescribing practitioner or clinical pharmacist (such as prescriptions, OTCs, herbal therapies and supplements) and documented in the medical record.    HPI    Patient Care Team:  Isidoro Mensah DO as PCP - General  RAUL Retana-CNP as PCP - [facility] Medicaid PCP     Review of Systems   Constitutional:  Positive for fatigue.   Musculoskeletal:  Positive for neck pain.   Neurological:  Positive for dizziness, weakness, light-headedness and headaches.       Objective   Vitals:  /78   Pulse 98   Ht 1.575 m (5' 2\")   Wt 114 kg (251 lb 6.4 oz) Comment: weighed with boot on  SpO2 96%   BMI 45.98 kg/m²       Physical Exam  Vitals and nursing note reviewed.   Constitutional:       General: She is not in acute distress.     Appearance: Normal appearance. She is well-developed. She is obese. She is not toxic-appearing.   HENT:      Head: Normocephalic and atraumatic.      Right Ear: Tympanic membrane and external ear normal.      Left Ear: Tympanic membrane and external ear normal.      Nose: Nose normal.      Mouth/Throat:      Mouth: Mucous membranes are moist.      Pharynx: Oropharynx is clear. No oropharyngeal exudate or posterior oropharyngeal erythema.      Tonsils: No tonsillar exudate. 2+ on the right. 2+ on the left.   Eyes:      Extraocular Movements: Extraocular movements intact.      Conjunctiva/sclera: Conjunctivae normal.   Cardiovascular:      Rate and Rhythm: Normal rate and regular rhythm.      Pulses: Normal pulses.      Heart sounds: Normal heart sounds. No murmur heard.  Pulmonary:      Effort: Pulmonary effort is normal.      Breath sounds: Normal breath sounds.   Abdominal:      General: Abdomen is flat. Bowel sounds are normal.      Palpations: Abdomen is soft. "   Musculoskeletal:      Cervical back: Neck supple.   Lymphadenopathy:      Cervical: No cervical adenopathy.   Skin:     General: Skin is warm and dry.      Findings: No rash.   Neurological:      Mental Status: She is alert. Mental status is at baseline.   Psychiatric:         Mood and Affect: Mood normal.         Behavior: Behavior normal.         Thought Content: Thought content normal.         Judgment: Judgment normal.         Assessment/Plan   Problem List Items Addressed This Visit       CVID (common variable immunodeficiency) (Multi)    Overview     Last Assessment & Plan: Formatting of this note might be different from the original. Intravenous immunoglobulin G as directed by hematologist         Current Assessment & Plan     Continues with infusion of Gammagard hemoglobin stable follows with immunologist         Major depressive disorder, recurrent, in full remission with anxious distress (CMS-[facility])    Overview     Formatting of this note might be different from the original. Last Assessment & Plan: Assessment: Patient has documented history of anxiety and noticed to be anxious during the interview Not in panic attacks.  No acute distress PLAN: · Conservative management with home medical regimen - Klonopin and Keppra         Current Assessment & Plan     Continue clonazepam treated by psychiatrist         Benign essential hypertension    Overview     Formatting of this note might be different from the original. hemodynamically stable, continue home meds Last Assessment & Plan: Assessment: No acute issues, stable PLAN: · Medical management with metoprolol tartrate 50 mg twice a day Last Assessment & Plan: Formatting of this note might be different from the original. Continue monitoring blood pressures and continue with her propranolol         Current Assessment & Plan     Continue amlodipine 5 mg daily with furosemide 20 mg twice a day         Complicated migraine    Overview     - Pt with history of  hemiplegic migraines that affect her left side. These are often triggered by IVIG infusions for which she uses Ubrelvy for pre and post treatment. She had a worse event on 11/7 and was treated at [place] for stroke workup. Migraine resovled after about 72 hrs with magnesium, steroids, and gabapentin.   - MRI brain and CTA head and neck without acute process.          Current Assessment & Plan     Follow-up with neurologist headache specialist to see if should take a holiday from medications not working at this time to include Aimovig Qulipta Nurtec         Diabetic gastroparesis associated with type 2 diabetes mellitus (Multi)    Overview     Last Assessment & Plan: Formatting of this note might be different from the original. Refer to GI, find out when last gastric emptying time was done         Current Assessment & Plan     Reduced dose of semaglutide may need to discontinue related to gastroparesis         Moderate persistent allergic asthma (HHS-HCC)    Current Assessment & Plan     Stable continue DuoNeb at home symptoms controlled with montelukast 10 mg daily         Class 3 severe obesity due to excess calories with serious comorbidity and body mass index (BMI) of 40.0 to 44.9 in adult (Multi) - Primary    Current Assessment & Plan     Continue on semaglutide 0.5 mg subcu q. weekly         Vaginal moniliasis    Current Assessment & Plan     Worsened by use of dapagliflozin even with reduced dose has had to use weekly fluconazole complicating her C VID discontinue at this time and reevaluate incontinence with urogynecologist         Benign intracranial hypertension due to metabolic disease    Current Assessment & Plan     Not a candidate for lumbar puncture due to normal findings of MRI and ophthalmology exam not revealing significant papilledema at this time although vision changes have been noted consistent with migraine also high risk meningitis with history of CVI D continue furosemide 20 mg twice a day  monitor blood pressure status optimize BMI goals increase amitriptyline from 25 to 50 mg daily reevaluate         Avulsion fracture of medial malleolus of right tibia    Current Assessment & Plan     As a result of accidental fall at home as follow-up with orthopedic foot surgeon for further evaluation placed in brace stable at this time         Analgesic rebound headache    Current Assessment & Plan     For the next 4 weeks for repeat discontinue chronic doses of acetaminophen and ibuprofen okay for Voltaren prior to immunoglobin therapy and as needed for pain involving avulsion fracture try to limit use not effective anyway for management of chronic headache         Relevant Medications    propranolol LA (Inderal LA) 60 mg 24 hr capsule    amitriptyline (Elavil) 50 mg tablet    Other Relevant Orders    Follow Up In [facility] - PCP - Established    Polypharmacy    Current Assessment & Plan     Discontinue melatonin discontinue Tylenol ibuprofen change propranolol 10 mg 3 times a day to extended release propranolol 80 mg daily work on reducing preventative migraine medications not working at all may have developed tolerance discussed with neurologist reducing medication burden             Patient was identified as a fall risk. Risk prevention instructions provided.

## 2024-06-18 NOTE — ADDENDUM NOTE
Addended by: CHELSEY PAEZ on: 6/18/2024 06:39 PM     Modules accepted: Orders     What Type Of Note Output Would You Prefer (Optional)?: Standard Output How Severe Are Your Spot(S)?: mild Have Your Spot(S) Been Treated In The Past?: has not been treated Hpi Title: Evaluation of Skin Lesions

## 2024-06-18 NOTE — ASSESSMENT & PLAN NOTE
For the next 4 weeks for repeat discontinue chronic doses of acetaminophen and ibuprofen okay for Voltaren prior to immunoglobin therapy and as needed for pain involving avulsion fracture try to limit use not effective anyway for management of chronic headache

## 2024-06-18 NOTE — ASSESSMENT & PLAN NOTE
Continue management with neurologist stable on levetiracetam 1500 mg twice a day with Vimpat 250 mg every 12 hours and Depakote  mg 1 tablet twice a day

## 2024-06-18 NOTE — PATIENT INSTRUCTIONS

## 2024-06-18 NOTE — ASSESSMENT & PLAN NOTE
Increased amitriptyline from 25 to 50 mg nightly discontinue ferrous sulfate continue continue Montegrity2 mg daily

## 2024-06-18 NOTE — ASSESSMENT & PLAN NOTE
Not a candidate for lumbar puncture due to normal findings of MRI and ophthalmology exam not revealing significant papilledema at this time although vision changes have been noted consistent with migraine also high risk meningitis with history of CVI D continue furosemide 20 mg twice a day monitor blood pressure status optimize BMI goals increase amitriptyline from 25 to 50 mg daily reevaluate

## 2024-06-18 NOTE — ASSESSMENT & PLAN NOTE
Follow-up with neurologist headache specialist to see if should take a holiday from medications not working at this time to include Aimovig Qulipta Nurtec

## 2024-06-18 NOTE — ASSESSMENT & PLAN NOTE
As a result of accidental fall at home as follow-up with orthopedic foot surgeon for further evaluation placed in brace stable at this time

## 2024-06-18 NOTE — ASSESSMENT & PLAN NOTE
Worsened by use of dapagliflozin even with reduced dose has had to use weekly fluconazole complicating her C VID discontinue at this time and reevaluate incontinence with urogynecologist

## 2024-06-20 DIAGNOSIS — E88.9: Primary | ICD-10-CM

## 2024-06-20 DIAGNOSIS — G93.2: Primary | ICD-10-CM

## 2024-06-20 PROCEDURE — RXMED WILLOW AMBULATORY MEDICATION CHARGE

## 2024-06-20 RX ORDER — AMITRIPTYLINE HYDROCHLORIDE 25 MG/1
50 TABLET, FILM COATED ORAL NIGHTLY
Qty: 180 TABLET | Refills: 3 | Status: SHIPPED | OUTPATIENT
Start: 2024-06-20

## 2024-06-20 NOTE — TELEPHONE ENCOUNTER
Pharmacy called states that the 50mg amitriptyline has a red dye in it and patient has an allergy to red dye they are asking if they can keep the 25mg but double the dose to make it 50mg

## 2024-06-21 ENCOUNTER — PHARMACY VISIT (OUTPATIENT)
Dept: PHARMACY | Facility: CLINIC | Age: 49
End: 2024-06-21
Payer: MEDICAID

## 2024-06-24 ENCOUNTER — PHARMACY VISIT (OUTPATIENT)
Dept: PHARMACY | Facility: CLINIC | Age: 49
End: 2024-06-24

## 2024-06-24 DIAGNOSIS — J30.89 PERENNIAL ALLERGIC RHINITIS WITH SEASONAL VARIATION: ICD-10-CM

## 2024-06-24 DIAGNOSIS — J30.2 PERENNIAL ALLERGIC RHINITIS WITH SEASONAL VARIATION: ICD-10-CM

## 2024-06-24 RX ORDER — FLUTICASONE PROPIONATE 50 MCG
SPRAY, SUSPENSION (ML) NASAL
Qty: 16 G | Refills: 3 | Status: SHIPPED | OUTPATIENT
Start: 2024-06-24

## 2024-06-25 ENCOUNTER — APPOINTMENT (OUTPATIENT)
Dept: ENDOCRINOLOGY | Facility: CLINIC | Age: 49
End: 2024-06-25
Payer: MEDICAID

## 2024-06-25 DIAGNOSIS — E27.8 ADRENAL NODULE (MULTI): ICD-10-CM

## 2024-06-25 DIAGNOSIS — E03.8 HYPOTHYROIDISM DUE TO HASHIMOTO'S THYROIDITIS: ICD-10-CM

## 2024-06-25 DIAGNOSIS — Z79.4 TYPE 2 DIABETES MELLITUS WITH DIABETIC AUTONOMIC NEUROPATHY, WITH LONG-TERM CURRENT USE OF INSULIN (MULTI): ICD-10-CM

## 2024-06-25 DIAGNOSIS — E04.1 THYROID NODULE: ICD-10-CM

## 2024-06-25 DIAGNOSIS — E06.3 HYPOTHYROIDISM DUE TO HASHIMOTO'S THYROIDITIS: ICD-10-CM

## 2024-06-25 DIAGNOSIS — E11.43 TYPE 2 DIABETES MELLITUS WITH DIABETIC AUTONOMIC NEUROPATHY, WITH LONG-TERM CURRENT USE OF INSULIN (MULTI): ICD-10-CM

## 2024-06-25 DIAGNOSIS — K31.84 DIABETIC GASTROPARESIS ASSOCIATED WITH TYPE 2 DIABETES MELLITUS (MULTI): ICD-10-CM

## 2024-06-25 DIAGNOSIS — E11.43 DIABETIC GASTROPARESIS ASSOCIATED WITH TYPE 2 DIABETES MELLITUS (MULTI): ICD-10-CM

## 2024-06-25 DIAGNOSIS — E11.65 UNCONTROLLED TYPE 2 DIABETES MELLITUS WITH HYPERGLYCEMIA (MULTI): Primary | ICD-10-CM

## 2024-06-25 PROBLEM — E27.9 ADRENAL NODULE: Status: ACTIVE | Noted: 2024-06-25

## 2024-06-25 PROCEDURE — 3051F HG A1C>EQUAL 7.0%<8.0%: CPT | Performed by: INTERNAL MEDICINE

## 2024-06-25 PROCEDURE — 95251 CONT GLUC MNTR ANALYSIS I&R: CPT | Performed by: INTERNAL MEDICINE

## 2024-06-25 PROCEDURE — 99214 OFFICE O/P EST MOD 30 MIN: CPT | Performed by: INTERNAL MEDICINE

## 2024-06-25 PROCEDURE — 3008F BODY MASS INDEX DOCD: CPT | Performed by: INTERNAL MEDICINE

## 2024-06-25 PROCEDURE — 3049F LDL-C 100-129 MG/DL: CPT | Performed by: INTERNAL MEDICINE

## 2024-06-25 PROCEDURE — 1036F TOBACCO NON-USER: CPT | Performed by: INTERNAL MEDICINE

## 2024-06-25 NOTE — PROGRESS NOTES
Consults    Reason For Consult  Diabetes and adrenal nodule , hypothyroidism , thyroid nodule     History Of Present Illness  Jonathan Ayala is a 48 y.o. female presenting with type 2 diabetes with hyperglycemia     She is using dexcom 7     toujeo 50 units in am  Carb coverage with humalog, 1 units per 3 carbs   Humalog 1 unit per 3 gram, 1 unit per 12 mg/dlWaiting omnipod to arrive     Ozempic 0.5mg weekly, had issues with swallowing, she is off now      She had back pain , adrenal nodule   CT 4/2024   Small left adrenal nodule measuring 11 mm measures 8 Hounsfield units consistent   with an adrenal adenoma.             type 2 diabetes    Initial diagnosis with diabetes was 2003. The patient has a family history of type 2 including father  She had comorbid conditions including obesity with BMI 46, hyperlipidemia, Vit D deficiency     ERCP and MRI pancreas done - pancreatitis , none for many years ( GI was not sure if it was true pancreatitis)   sees neurology at [facility] and also she sees migraine            The patient is currently checking the blood glucose using Dexcom CGM.         Last foot exam: 3/2024  Last eye exam: Dr Mederos, 3/2024   Last urine albumin:  4/ 2024  Last LDL: 4/2024   had double vision with statin , she is taking an alternative      She was evaluated for seizure disorder    States she has epileptic and nonepileptic seizures,         Home Management    Results from Most Recent A1C  Hemoglobin A1C   Date/Time Value Ref Range Status   04/18/2024 08:19 AM 7.9 (H) see below % Final        Diabetes Problem List Entries with Dates  Problem List:  2023-07: Type 2 diabetes mellitus with diabetic autonomic neuropathy,   with long-term current use of insulin (Multi)  2020-08: Diabetic gastroparesis associated with type 2 diabetes   mellitus (Multi)      History of DKA with Dates:   This is not able to automated, and will cause the clinician to review the entire history of patient. Solved, need to look at  History of Diabetes Problem List. Includes resolved entries. Clinician can look above and enter the count.      NOTE: Anything under this line is for testing purposes only       Any family history of thyroid cancer? no  Any personal history of radiation to your head/neck? no    Past Medical History  She has a past medical history of Abnormal findings on diagnostic imaging of other abdominal regions, including retroperitoneum (10/14/2020), Acquired deformity of nose (03/24/2022), Acute upper respiratory infection, unspecified (10/16/2019), Allergy status to unspecified drugs, medicaments and biological substances (05/22/2020), Allergy status to unspecified drugs, medicaments and biological substances (11/13/2020), Benign intracranial hypertension (01/27/2022), Bipolar disorder, unspecified (Multi), Breast calcification, right (08/21/2018), Cellulitis of abdominal wall (09/28/2022), Cervicalgia (07/01/2020), Chronic maxillary sinusitis (01/04/2022), Chronic sialoadenitis (03/16/2020), COVID-19 (01/06/2022), Decreased white blood cell count, unspecified (11/04/2019), Disease of thyroid gland, Disturbances of salivary secretion (03/16/2020), Dry eye syndrome of bilateral lacrimal glands (10/07/2022), Encounter for preprocedural cardiovascular examination (02/01/2022), Food additives allergy status (06/11/2020), Fracture of nasal bones, initial encounter for closed fracture (03/03/2022), Granuloma of right orbit (10/07/2021), History of endometrial ablation (11/09/2017), Hyperlipidemia, Hypertension, Hyperthyroidism, Hypoglycemia, Hypothyroidism, Localized swelling, mass and lump, head (03/24/2022), Major depressive disorder, recurrent, in full remission (CMS-HCC) (10/07/2021), 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), Migraine, Nipple discharge (08/24/2022), Ocular pain, right eye (10/07/2022), Optic atrophy, Other abnormal and inconclusive findings on diagnostic imaging of breast (07/06/2020), Other anomalies  of pupillary function (05/31/2019), Other chest pain (05/18/2020), Other conditions influencing health status (08/01/2022), Other conditions influencing health status (08/03/2021), Other specified disorders of eustachian tube, left ear (11/18/2019), Other specified disorders of nose and nasal sinuses (03/24/2022), Other specified disorders of nose and nasal sinuses (03/24/2022), Pelvic and perineal pain (07/06/2020), Personal history of other diseases of the circulatory system (04/07/2020), Personal history of other diseases of the circulatory system (04/07/2020), Personal history of other diseases of the circulatory system, Personal history of other diseases of the digestive system, Personal history of other diseases of the digestive system (03/02/2020), Personal history of other diseases of the musculoskeletal system and connective tissue (01/19/2022), Personal history of other diseases of the musculoskeletal system and connective tissue (03/02/2021), Personal history of other diseases of the musculoskeletal system and connective tissue (06/16/2020), Personal history of other diseases of the nervous system and sense organs (11/18/2019), Personal history of other diseases of the nervous system and sense organs (09/21/2022), Personal history of other diseases of the respiratory system (04/14/2021), Personal history of other endocrine, nutritional and metabolic disease (02/17/2021), Personal history of other mental and behavioral disorders (05/27/2021), Personal history of other specified conditions (09/07/2022), Personal history of other specified conditions (10/16/2019), Personal history of other specified conditions (09/28/2022), Personal history of other specified conditions (09/16/2021), Personal history of other specified conditions (02/01/2022), Personal history of other specified conditions (03/09/2022), Personal history of other specified conditions (02/12/2014), Personal history of other specified  conditions (10/27/2021), Personal history of other specified conditions (10/16/2019), Personal history of other specified conditions (02/26/2021), Personal history of other specified conditions (02/22/2021), Personal history of urinary calculi, Polycystic ovary syndrome, Postural orthostatic tachycardia syndrome (POTS), Rash and other nonspecific skin eruption (03/15/2022), Repeated falls (06/23/2021), Right lower quadrant pain (10/14/2020), Sjogren syndrome, unspecified (Multi), Sjogren's syndrome (Multi), Slow transit constipation (07/09/2020), Subarachnoid hemorrhage, traumatic (Multi) (04/19/2023), Thyroid nodule, Traumatic subarachnoid hemorrhage with loss of consciousness of unspecified duration, subsequent encounter (03/15/2022), Traumatic subarachnoid hemorrhage without loss of consciousness, subsequent encounter, Type 2 diabetes mellitus (Multi), Unspecified disorder of refraction (10/07/2022), Unspecified optic neuritis (11/06/2020), Unspecified optic neuritis (11/06/2020), Unspecified visual loss (09/25/2019), Venous insufficiency (chronic) (peripheral) (10/18/2021), Viral infection, unspecified (01/11/2022), Vitamin D deficiency, and Vitamin D deficiency, unspecified (09/28/2022).    Surgical History  She has a past surgical history that includes Other surgical history (08/22/2019); Other surgical history (08/22/2019); Other surgical history (08/22/2019); Other surgical history (08/22/2019); Other surgical history (08/22/2019); Other surgical history (08/22/2019); MR angio neck wo IV contrast (02/08/2021); MR angio head wo IV contrast (02/08/2021); [place] tooth extraction (2004); Appendectomy (2017); Hysterectomy (2017); and Hernia repair (Right, 03/01/2024).     Social History  She reports that she has never smoked. She has never used smokeless tobacco. She reports that she does not currently use alcohol. She reports current drug use. Drug: Benzodiazepines.    Family History  Family History   Problem  Relation Name Age of Onset    Other (Perforated bowel) Mother Radha     Hypertension Mother Radha     Macular degeneration Mother Radha     Hyperlipidemia Father Mac     Hypertension Father Mac     Heart attack Father Mac     Other (S/P CABG) Father Mac     Diabetes Father Mac     Prostate cancer Father Mac     Coronary artery disease Father Mac     Heart failure Father Mac     Stroke Father Mac     Cancer Father Mac     Macular degeneration Father Mac     Retinal detachment Father Mac     Stroke Sister Jazmin     Breast cancer Sister Jazmin     Lupus Sister Jazmin     Ovarian cancer Sister Jazmin     Astigmatism Sister Jazmin     Hyperlipidemia Brother Sanchez     Hypertension Brother Sanchez     Astigmatism Brother Sanchez     Diabetes Father's Sister Linda     Blindness Father's Sister Linda     Thyroid cancer Father's Sister Bekah     Thyroid disease Father's Sister Jessica     Colon cancer Father's Brother ?     Blindness Other Multiple     Melanoma Other      Asthma Other      Other (hay fever) Other      Allergies Other      Cancer Maternal Grandmother Brenda     Cancer Father's Brother Kian         Allergies  Acetazolamide, Atorvastatin, Cefdinir, Ceftriaxone, Doxepin, Duloxetine, Levofloxacin, Levofloxacin in d5w, Nutritional supplements, Ozempic [semaglutide], Prochlorperazine, Red dye, Rosemary, Rosemary oil, Strawberry, Sulfa (sulfonamide antibiotics), Topiramate, Tree pollen-black walnut, Tree pollen-pecan, Pklyfjch-2-lv4 antimigraine agents, [place], Aripiprazole, Aspartame, Aspartame (bulk), Fenofibrate, Gluten, Hydrochlorothiazide, Hydromorphone, Iron, Meclizine, Metformin, Propoxyphene, Statins-hmg-coa reductase inhibitors, Tetracyclines, Thiazides, Venlafaxine, Wheat, Adhesive tape-silicones, Barbiturates, Ciprofloxacin, Dhe, Diet foods, Ferrous sulfate, Nsaids (non-steroidal anti-inflammatory drug), Other, Sulfonylureas, Tobramycin, Vancomycin, Adhesive, Azithromycin, Betamethasone, House  dust, Metoclopramide hcl, Milk, Prednisone, Propoxyphene-acetaminophen, Sulfacetamide sodium, and Zolpidem    Review of Systems    Past Medical History:   Diagnosis Date    Abnormal findings on diagnostic imaging of other abdominal regions, including retroperitoneum 10/14/2020    Abnormal CT of the abdomen    Acquired deformity of nose 03/24/2022    Nasal deformity    Acute upper respiratory infection, unspecified 10/16/2019    Acute URI    Allergy status to unspecified drugs, medicaments and biological substances 05/22/2020    History of drug allergy    Allergy status to unspecified drugs, medicaments and biological substances 11/13/2020    History of adverse drug reaction    Benign intracranial hypertension 01/27/2022    Pseudotumor cerebri    Bipolar disorder, unspecified (Multi)     Bipolar disease, chronic    Breast calcification, right 08/21/2018    Cellulitis of abdominal wall 09/28/2022    Cellulitis of right abdominal wall    Cervicalgia 07/01/2020    Cervicalgia of vuzzxzsc-jvhywlb-jfpgx region    Chronic maxillary sinusitis 01/04/2022    Chronic maxillary sinusitis    Chronic sialoadenitis 03/16/2020    Chronic sialoadenitis    COVID-19 01/06/2022    COVID-19 with multiple comorbidities    Decreased white blood cell count, unspecified 11/04/2019    Leukopenia    Disease of thyroid gland     Disturbances of salivary secretion 03/16/2020    Xerostomia    Dry eye syndrome of bilateral lacrimal glands 10/07/2022    Dry eyes, bilateral    Encounter for preprocedural cardiovascular examination 02/01/2022    Preoperative cardiovascular examination    Food additives allergy status 06/11/2020    Allergy to food dye    Fracture of nasal bones, initial encounter for closed fracture 03/03/2022    Closed fracture of nasal bone, initial encounter    Granuloma of right orbit 10/07/2021    Inflammatory pseudotumor of right orbit    History of endometrial ablation 11/09/2017    Hyperlipidemia     Hypertension      Hyperthyroidism     Hypoglycemia     Hypothyroidism     Localized swelling, mass and lump, head 03/24/2022    Swollen nose    Major depressive disorder, recurrent, in full remission (CMS-[facility]) 10/07/2021    Depression, major, recurrent, in complete remission    Mammary duct ectasia of left breast 08/24/2022    Periductal mastitis of left breast    Migraine     Nipple discharge 08/24/2022    Bloody discharge from left nipple    Ocular pain, right eye 10/07/2022    Pain in right eye    Optic atrophy     Other abnormal and inconclusive findings on diagnostic imaging of breast 07/06/2020    Other abnormal and inconclusive findings on diagnostic imaging of breast    Other anomalies of pupillary function 05/31/2019    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    Other chest pain 05/18/2020    Chest discomfort    Other conditions influencing health status 08/01/2022    History of cough    Other conditions influencing health status 08/03/2021    Chronic migraine    Other specified disorders of eustachian tube, left ear 11/18/2019    ETD (Eustachian tube dysfunction), left    Other specified disorders of nose and nasal sinuses 03/24/2022    Nasal dryness    Other specified disorders of nose and nasal sinuses 03/24/2022    Nasal crusting    Pelvic and perineal pain 07/06/2020    Pelvic pain    Personal history of other diseases of the circulatory system 04/07/2020    History of sinus tachycardia    Personal history of other diseases of the circulatory system 04/07/2020    History of abnormal electrocardiography    Personal history of other diseases of the circulatory system     History of Raynaud's syndrome    Personal history of other diseases of the digestive system     History of irritable bowel syndrome    Personal history of other diseases of the digestive system 03/02/2020    History of oral pain    Personal history of other diseases of the musculoskeletal system and connective tissue 01/19/2022    History of neck  pain    Personal history of other diseases of the musculoskeletal system and connective tissue 03/02/2021    History of scleroderma    Personal history of other diseases of the musculoskeletal system and connective tissue 06/16/2020    History of muscle weakness    Personal history of other diseases of the nervous system and sense organs 11/18/2019    History of hearing loss    Personal history of other diseases of the nervous system and sense organs 09/21/2022    History of partial seizures    Personal history of other diseases of the respiratory system 04/14/2021    History of asthma    Personal history of other endocrine, nutritional and metabolic disease 02/17/2021    History of diabetes mellitus    Personal history of other mental and behavioral disorders 05/27/2021    History of anxiety    Personal history of other specified conditions 09/07/2022    History of nipple discharge    Personal history of other specified conditions 10/16/2019    History of headache    Personal history of other specified conditions 09/28/2022    History of lump of left breast    Personal history of other specified conditions 09/16/2021    History of persistent cough    Personal history of other specified conditions 02/01/2022    History of palpitations    Personal history of other specified conditions 03/09/2022    History of headache    Personal history of other specified conditions 02/12/2014    History of chest pain    Personal history of other specified conditions 10/27/2021    History of nausea and vomiting    Personal history of other specified conditions 10/16/2019    History of fatigue    Personal history of other specified conditions 02/26/2021    History of orthopnea    Personal history of other specified conditions 02/22/2021    History of shortness of breath    Personal history of urinary calculi     H/O renal calculi    Polycystic ovary syndrome     Postural orthostatic tachycardia syndrome (POTS)     POTS (postural  orthostatic tachycardia syndrome)    Rash and other nonspecific skin eruption 03/15/2022    Rash    Repeated falls 06/23/2021    Recurrent falls    Right lower quadrant pain 10/14/2020    Abdominal pain, RLQ (right lower quadrant)    Sjogren syndrome, unspecified (Multi)     History of Sjogren's disease    Sjogren's syndrome (Multi)     Slow transit constipation 07/09/2020    Slow transit constipation    Subarachnoid hemorrhage, traumatic (Multi) 04/19/2023    Thyroid nodule     Traumatic subarachnoid hemorrhage with loss of consciousness of unspecified duration, subsequent encounter 03/15/2022    Subarachnoid hemorrhage following injury, with loss of consciousness, subsequent encounter    Traumatic subarachnoid hemorrhage without loss of consciousness, subsequent encounter     Subarachnoid hemorrhage following injury, no loss of consciousness, subsequent encounter    Type 2 diabetes mellitus (Multi)     Unspecified disorder of refraction 10/07/2022    Refractive error    Unspecified optic neuritis 11/06/2020    Right optic neuritis    Unspecified optic neuritis 11/06/2020    Optic neuritis, right    Unspecified visual loss 09/25/2019    Vision loss    Venous insufficiency (chronic) (peripheral) 10/18/2021    Chronic venous insufficiency of lower extremity    Viral infection, unspecified 01/11/2022    Nonspecific syndrome suggestive of viral illness    Vitamin D deficiency     Vitamin D deficiency, unspecified 09/28/2022    Vitamin D deficiency       Past Surgical History:   Procedure Laterality Date    APPENDECTOMY  2017    HERNIA REPAIR Right 03/01/2024    with mesh    HYSTERECTOMY  2017    MR HEAD ANGIO WO IV CONTRAST  02/08/2021    MR HEAD ANGIO WO IV CONTRAST 2/8/2021 [facility]ICAL [facility]GAC[facility]    MR NECK ANGIO WO IV CONTRAST  02/08/2021    MR NECK ANGIO WO IV CONTRAST 2/8/2021 [facility] CLINICAL LEGACY    OTHER SURGICAL HISTORY  08/22/2019    Carpal tunnel surgery    OTHER SURGICAL HISTORY  08/22/2019    Hysterectomy     OTHER SURGICAL HISTORY  08/22/2019    Venous access port placement    OTHER SURGICAL HISTORY  08/22/2019    Cholecystectomy    OTHER SURGICAL HISTORY  08/22/2019    Appendectomy    OTHER SURGICAL HISTORY  08/22/2019    Pyloroplasty    WISDOM TOOTH EXTRACTION  2004       Social History     Socioeconomic History    Marital status:      Spouse name: Not on file    Number of children: Not on file    Years of education: Not on file    Highest education level: Not on file   Occupational History    Not on file   Tobacco Use    Smoking status: Never    Smokeless tobacco: Never   Vaping Use    Vaping status: Never Used   Substance and Sexual Activity    Alcohol use: Not Currently     Comment: Socially in College    Drug use: Yes     Types: Benzodiazepines    Sexual activity: Not Currently     Partners: Male     Birth control/protection: Abstinence, Surgical     Comment: Partial Hysterectomy   Other Topics Concern    Not on file   Social History Narrative    Not on file     Social Determinants of Health     Financial Resource Strain: Low Risk  (7/28/2021)    Received from [facility]    Overall Financial Resource Strain (CARDIA)     Difficulty of Paying Living Expenses: Not hard at all   Food Insecurity: No Food Insecurity (7/28/2021)    Received from [facility]    Hunger Vital Sign     Worried About Running Out of Food in the Last Year: Never true     Ran Out of Food in the Last Year: Never true   Transportation Needs: No Transportation Needs (7/28/2021)    Received from [facility]    PRAPARE - Transportation     Lack of Transportation (Medical): No     Lack of Transportation (Non-Medical): No   Physical Activity: Inactive (7/28/2021)    Received from [facility]    Exercise Vital Sign     Days of Exercise per Week: 0 days     Minutes of Exercise per Session: 0 min   Stress: Stress Concern Present (7/28/2021)    Received from [facility]    [employer] - Occupational Stress Questionnaire     Feeling of Stress : To some extent   Social Connections: Moderately Integrated (7/28/2021)    Received from [facility]    Social Connection and Isolation Panel [NHANES]     Frequency of Communication with Friends and Family: Three times a week     Frequency of Social Gatherings with Friends and Family: Twice a week     Attends Orthodoxy Services: 1 to 4 times per year     Active Member of Clubs or Organizations: No     Attends Club or Organization Meetings: Never     Marital Status:    Intimate Partner Violence: Not on file   Housing Stability: Low Risk  (7/28/2021)    Received from [facility]    Housing Stability Vital Sign     Unable to Pay for Housing in the Last Year: No     Number of Places Lived in the Last Year: 1     Unstable Housing in the Last Year: No        Physical Exam     ROS, PMH, FH/SH, surgical history and allergies have been reviewed.    Last Recorded Vitals  There were no vitals taken for this visit.    Relevant Results         If you would like to pull in Medications, type .meds     If you would like to pull in Lab results for the last 24 hours, type .[id number]    If you would like to pull in specific Lab results, type .ll     If you would like to pull in Imaging results, type .imgrslt :99}  Lab Review  Lab Results   Component Value Date    BILITOT 0.3 02/22/2024    CALCIUM 9.1 04/23/2024    CO2 29 04/23/2024     04/23/2024    CREATININE 0.85 04/23/2024    GLUCOSE 215 (H) 04/23/2024    ALKPHOS 58 02/22/2024    K 4.2 04/23/2024    PROT 7.8 02/22/2024     04/23/2024    AST 21 02/22/2024    ALT 34 02/22/2024    BUN 20 04/23/2024    ANIONGAP 12 04/23/2024    MG 1.77 02/06/2024    PHOS 3.8 04/30/2023    LDH 95 07/17/2020    ALBUMIN 3.8 02/22/2024    LIPASE 83 (H) 02/22/2024    GFRF 89 08/06/2023    GFRMALE CANCELED  2023     Lab Results   Component Value Date    TRIG 272 (H) 2024    CHOL 204 (H) 2024    LDLCALC 103 (H) 2024    HDL 46.5 2024     Lab Results   Component Value Date    HGBA1C 7.9 (H) 2024    HGBA1C 5.9 (H) 2023    HGBA1C 7.8 (A) 2023     The ASCVD Risk score (Mamie SANDERSON, et al., 2019) failed to calculate for the following reasons:    The patient has a prior MI or stroke diagnosis       Assessment/Plan   Problem List Items Addressed This Visit             ICD-10-CM    Diabetic gastroparesis associated with type 2 diabetes mellitus (Multi) E11.43, K31.84    Hypothyroidism E03.9    Thyroid nodule E04.1    Type 2 diabetes mellitus with diabetic autonomic neuropathy, with long-term current use of insulin (Multi) E11.43, Z79.4    Adrenal nodule (Multi) E27.8     Other Visit Diagnoses         Codes    Uncontrolled type 2 diabetes mellitus with hyperglycemia (Multi)    -  Primary E11.65                 Ovarian cancer sister  from recently, she has CT and showed ovarian enlargement   Other sister had breast cancer       dex suppression, was negative       Adrenal insufficiency (255.41) (E27.40)   · Dyslipidemia, goal LDL below 70 (272.4) (E78.5) zetia RX provided due to statin intolerance plus       · Type 2 diabetes mellitus with hyperglycemia, with long-term current use of insulin  (250.00,790.29,V58.67) (E11.65,Z79.4)   · Thyroid nodule (241.0) (E04.1)  due US , order placed    · Hypothyroidism (244.9) (E03.9)     secondary insufficiency , previous history of frontal lobe infarct  HC current;y on 10 mg a day ,        Change diabetes regimen is as follows:   toujeo 50 units daily      Continue  Humalog 1 unit per 4 gram, 1 unit per 30 mg/dl  Farxiga 10 mg daily - stopped due to UTI/yeast infection  ozempic 0.25 mg weekly,  stopped due to swallowing problem and nausea   ophthalmology  3/2024   has carpal tunnel and sees neurology for neuropathy and migraine       hypothyroid treated TSH levothyroxine 50 mcg (dye allergy) 75 mcg a day             Marah Felix MD

## 2024-06-26 ENCOUNTER — APPOINTMENT (OUTPATIENT)
Dept: OPHTHALMOLOGY | Facility: CLINIC | Age: 49
End: 2024-06-26
Payer: MEDICAID

## 2024-06-26 DIAGNOSIS — H47.20 OPTIC ATROPHY: ICD-10-CM

## 2024-06-26 DIAGNOSIS — G93.2 BENIGN INTRACRANIAL HYPERTENSION: ICD-10-CM

## 2024-06-26 DIAGNOSIS — H47.012 NAION (NON-ARTERITIC ANTERIOR ISCHEMIC OPTIC NEUROPATHY), LEFT EYE: Primary | ICD-10-CM

## 2024-06-26 PROCEDURE — 99214 OFFICE O/P EST MOD 30 MIN: CPT | Performed by: PSYCHIATRY & NEUROLOGY

## 2024-06-26 PROCEDURE — 92133 CPTRZD OPH DX IMG PST SGM ON: CPT | Performed by: PSYCHIATRY & NEUROLOGY

## 2024-06-26 PROCEDURE — 92083 EXTENDED VISUAL FIELD XM: CPT | Performed by: PSYCHIATRY & NEUROLOGY

## 2024-06-26 ASSESSMENT — CONF VISUAL FIELD
OS_INFERIOR_TEMPORAL_RESTRICTION: 3
OS_SUPERIOR_TEMPORAL_RESTRICTION: 3
OD_SUPERIOR_NASAL_RESTRICTION: 1
OD_SUPERIOR_TEMPORAL_RESTRICTION: 1
OS_INFERIOR_NASAL_RESTRICTION: 3
OS_SUPERIOR_NASAL_RESTRICTION: 3

## 2024-06-26 ASSESSMENT — VISUAL ACUITY
OS_PH_CC: 20/400
OS_CC: 20/500
OD_CC: 20/40
METHOD: SNELLEN - LINEAR
OD_PH_CC: 20/25
CORRECTION_TYPE: GLASSES

## 2024-06-26 ASSESSMENT — ENCOUNTER SYMPTOMS
RESPIRATORY NEGATIVE: 0
EYES NEGATIVE: 1
NEUROLOGICAL NEGATIVE: 0
PSYCHIATRIC NEGATIVE: 0
MUSCULOSKELETAL NEGATIVE: 0
ENDOCRINE NEGATIVE: 0
HEMATOLOGIC/LYMPHATIC NEGATIVE: 0
CONSTITUTIONAL NEGATIVE: 0
CARDIOVASCULAR NEGATIVE: 0
ALLERGIC/IMMUNOLOGIC NEGATIVE: 0
GASTROINTESTINAL NEGATIVE: 0

## 2024-06-26 ASSESSMENT — TONOMETRY
OS_IOP_MMHG: 14
IOP_METHOD: GOLDMANN APPLANATION
OD_IOP_MMHG: 18

## 2024-06-26 ASSESSMENT — CUP TO DISC RATIO
OD_RATIO: 0.15
OS_RATIO: 0.15

## 2024-06-26 ASSESSMENT — EXTERNAL EXAM - RIGHT EYE: OD_EXAM: NORMAL

## 2024-06-26 ASSESSMENT — SLIT LAMP EXAM - LIDS
COMMENTS: NORMAL
COMMENTS: NORMAL

## 2024-06-26 ASSESSMENT — EXTERNAL EXAM - LEFT EYE: OS_EXAM: NORMAL

## 2024-06-26 NOTE — LETTER
"June 26, 2024     Jose Francisco Thomas    Patient: Jonathan Ayala   YOB: 1975   Date of Visit: 6/26/2024     Dear Dr. Jose Francisco Thomas:    I am writing to share my findings regarding our shared patient Jonathan Ayala from her visit with me on 6/26/2024.    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    She had  visit to the ED with negative imaging. The right eye is improving, but the superior nasal quadrant looks abnormal and somewhat overlapping.    Headaches have been a problem. She had some allergic reactions. She stopped Ubrelvy with improvement in rash, so she is off it for now.    She had a right ankle fracture from tripping on a rock.    She has been working with counselor Jose Francisco Thomas on problems including non-epileptic events.  Last edited by Rob Mederos MD PhD on 6/26/2024  3:57 PM.        Diagnoses    Diagnoses and all orders for this visit:  NAION (non-arteritic anterior ischemic optic neuropathy), left eye (Primary)  -     [place]ar[place] Visual Field - OU - Both Eyes  -     OCT, Optic Nerve - OU - Both Eyes  Benign intracranial hypertension  -     [place]arpe Visual Field - OU - Both Eyes  -     OCT, Optic Nerve - OU - Both Eyes  Optic atrophy  -     Sharpe Visual Field - OU - Both Eyes  -     OCT, Optic Nerve - OU - Both Eyes    Assessment and Plan    06/08/2021 +OKN response OD  09/30/2019 +OKN response OD    06/05/2024 MRI orbits with contrast, which I personally reviewed, shows right frontal encephalomalacia similar to prior imaging.    11/07/2023 MRI brain without contrast, by report from [facility], shows \"No acute intracranial abnormality including " "no evidence of an acute or recent brain parenchymal infarct. \"  11/07/2023 CTA head & neck & CT head without contrast, by report from [facility], show \"No acute abnormality of the cervical and intracranial vasculature.\" And \"No evidence of an acute intracranial process.     Focal RIGHT frontal lobe encephalomalacia deep to a sherley hole, new from   02/25/2020. \"  08/01/2023 MRV head, which I personally reviewed previously, shows no lesion.  07/29/2023 MRI brain & orbits with contrast, which I personally reviewed previously, shows right frontal encephalomalacia consistent with prior bolt.  Interval head imaging.  10/05/2022 CT head without contrast & CTA head & neck, by report from [place], show \" 1.   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  Overlying right-sided sherley hole.)  2.  Prominence of the ventricles and sulci for age.  \" and \"1. Similar appearing old areas of infarction involving the right and left frontal lobes extending to the convexities with underlying encephalomalacia at site of prior hemorrhage from 02/13/2022. Overlying right-sided sherley hole. Prominence of the ventricles and sulci for age. No evidence of acute infarction. No active hemorrhage. 2. No significant stenosis internal carotid arteries. 3. No significant stenosis of the intracranial vessels or evidence of aneurysm. 4. Aberrant right subclavian artery behind the esophagus with no dilation of the proximal esophagus.\"  09/25/2022 CT head without contrast, which I personally reviewed previously, shows no lesion.  04/20/2022 CT head without contrast, which I personally reviewed previously, shows right frontal encephalomalacia judged stable by Radiology.  Interval head imaging.  09/10/2021 MRI brain with contrast, which I personally reviewed previously, shows no lesion.  09/09/2021 " CTA head & neck, which I personally reviewed previously, shows no lesion.  09/09/2021 CT head without contrast, which I personally reviewed previously, shows no lesion.  08/11/2021 MRI brain & orbits with contrast & MRV head, which I personally reviewed previously, show left optic atrophy with Radiology noting “Punctate focus of enhancement seen along the left 7th-8th cranial nerve bundle at the level of the porus acusticus, indeterminate. Otherwise unremarkable MRI of the brain.”  Interval head imaging.  06/04/2021 MRI brain & orbits with contrast, which I personally reviewed previously, shows left optic atrophy.  Interval head imaging.  06/29/2017 MRI brain without contrast, which I personally reviewed previously, shows no lesion.  11/22/2007 CT head without contrast, which I personally reviewed previously, shows no lesion.    08/31/2023 lumbar puncture tube 1: , WBC 0, tube 4: RBC 6 & WBC 0, protein 91 & glucose 71.  08/11/2021 lumbar puncture opening pressure 18 cm water, tube 1: RBC 0, WBC 16 (86% L, 13% M), tube 4: RBC 0 & WBC 16 (92% L, 8% M), protein 71 & glucose 61. Cytology negative. Flow cytometry negative. Oligoclonal bands 0. IgG studies with high CSF IgG & albumin.  08/05/2020 lumbar puncture opening pressure 20 cm water, protein 68, glucose 85. MBP wnl. Oligoclonal bands POSITIVE 4.  12/26/2019 lumbar puncture no opening pressure checked per patient) tube ?: RBC 39, WBC 6 (91% L, 9% M), tube ?: RBC 38, WBC 5, protein 89, glucose 79. (via [employer] from [employer])  11/13/2019 lumbar puncture opening pressure 22 cm water, tube 1: RBC 9, WBC 4, tube 4: RBC 1 & WBC 5, protein 82 & glucose 61. Oligoclonal bands 0. IgG studies with non-specific abnormalities. HSV PCR negative.  09/20/2019 lumbar puncture opening pressure 20 cm water, RBC 1, WBC 7 (96% L, 4% M), protein 94 & glucose 78.  01/31/2015 lumbar puncture RBC 2, WBC 0, protein 104 & glucose 74.    07/27/2019 multifocal ERG with mildly reduced  central responses.  Pattern VEP with OD borderline latency at 0.25 degrees and OS with severely prolonged latencies and decreased amplitudes.    Lab Results   Component Value Date/Time    SEDRATE 19 08/01/2023 1558    SEDRATE 33 (H) 08/07/2022 0322    SEDRATE 19 05/25/2022 1501    SEDRATE 3 06/05/2020 1110    SEDRATE 6 05/13/2020 1529    SEDRATE 3 03/28/2020 0629    SEDRATE 5 09/16/2019 0507    SEDRATE 8 08/19/2019 1314    CRP 0.90 08/07/2022 0322    CRP 0.35 05/25/2022 1501    CRP 0.34 09/23/2021 0618    CRP 0.71 09/21/2021 0648    CRP 0.14 07/16/2020 0944    CRP 0.10 06/05/2020 1110    CRP <0.10 05/13/2020 1529    CRP <0.10 03/28/2020 0629    CRP 3.64 (A) 11/21/2019 2157    CRP <0.10 08/19/2019 1314      Lab Results   Component Value Date/Time    ONGCBDSD81 299 02/23/2023 0941    ANUGXHAS00 709 02/09/2021 0451    XWTSJSQC58 508 12/10/2019 1349    RCFOL 951 12/10/2019 1349      Lab Results   Component Value Date/Time    NMOAQP4 Negative 11/14/2019 1447    MOGFACS Negative 11/10/2020 1000    MOGFACS Negative 11/14/2019 1447    ACE 26 11/10/2020 1000      Tuberculosis studies  Lab Results   Component Value Date/Time    TBSIN Negative 09/22/2021 0430    TBSIN Negative 11/10/2020 1000    TBSIN Negative 11/14/2019 0531      12/04/2023 ESR 10. CRP <0.3 mg/dL.  10/01/2023 syphilis non-reactive (NR).  09/18/2020 ESR 1. CRP < 0.08 mg/dL (0.8 mg/L).  08/28/2020 HbA1c HIGH 7.0. Lipid panel with LDL 64.  08/05/2020 B12 462. Folate wnl. AQP4 ab negative.  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 Suarez.  07/09/2019 BRETT > 1:640. Anti-centromere HIGH 101 (0-40). scl-70 negative. Chromatin ab IgG, anti-ribosomal ab, anti-Sarahy ab, anti-DNA ab negative.    06/26/2024 OCT RNFL OD 95 & OS 41. (Stable)  06/05/2024 OCT RNFL OD 95 & OS 40. (Stable)  OCT macula OD normal foveal contour 264 & OS normal foveal contour 234.  03/15/2024 OCT RNFL OD 92 & OS 36. (Stable)  01/09/2024 OCT RNFL OD 89 & OS 36.  (Stable)  02/06/2023 OCT RNFL OD 92 & OS 38. (stable)  OCT macula OD normal foveal contour 255 & OS thinning RNFL miscentered wrt fovea.  10/07/2022 OCT RNFL OD 92 & OS 40. (decreased OD & stable OS)  09/23/2022 OCT RNFL OD 98 & OS 38. (increased OD & stable OS)  01/27/2022 OCT RNFL OD 88 & OS 37. (stable)  10/06/2021 OCT RNFL OD 93 & OS 39 (stable)..  08/19/2021 OCT RNFL OD 92 & OS 38. (stable)  06/08/2021 OCT RNFL OD 87 & OS 37. (stable)  01/28/2021 OCT RNFL OD 85 & OS 37. (stable)  11/06/2020 OCT RNFL OD 89 & OS 39. (stable)  07/27/2020 OCT RNFL OD 89 & OS 38. (stable)  01/07/2020 OCT RNFL OD 93 & OS 38. (stable to mild increase OD, stable to mild decrease OS)  11/27/2019 OCT RNFL OD 84 & OS 42. (stable to mild OD decrease)  09/30/2019 OCT RNFL OD 89 & OS 41. (stable)  07/18/2019 OCT RNFL OD 90 & OS 39.  OCT macula OD normal foveal contour 256 & OS thinning of RNFL & GCL, but preserved foveal contour 238.    06/26/2024 HVF 24-2 OD fovea 33, FL 3/15, FP 0%, FN 20%, superior arcuate MD -13.69 & OS stimulus V, fovea 27, generalized depression.  06/05/2024 HVF 24-2 OD fovea 34, FL 2/16, FP 0%, FN 40%, superior altitudinal MD -18.00 & OS stimulus V, fovea 25, generalized depression.  03/15/2024 HVF 24-2 OD fovea 36, wnl MD -1.05 & OS stimulus V, fovea 3, inferior arcuate > superior arcuate.  01/09/2024 HVF 24-2 OD wnl MD -0.94 & OS generalized depression MD -32.10.  02/06/2023 HVF 24-2 OD fovea 36, FL 1/17, FP 0%< FN 15%, scatter MD -7.58 & OS stimulus V, fovea 19, generalized depression.  10/07/2022 HVF 24-2 OD fovea 35, wnl MD -1.26 & OS fovea 5, generalized depression MD -30.62.  09/23/2022 HVF 24-2 OD fovea 38, scatter MD -2.67 & OS stimulus V, fovea 16, superior arcuate & inferior arcuate.  01/27/2022 HVF 24-2 OD fovea 36, wnl MD -1.67 & OS stimulus V, fovea 28, generalized reduction.  10/06/2021 HVF 24-2 OD fovea 36, scatter MD -4.76 & OS stimulus V, generalized reduction.  08/19/2021 HVF 24-2 OD fovea 39, wnl  MD -2.31 & OS stimulus V, fovea 28, generalized depression.  06/08/2021 HVF Stimulus V OD fovea 34, wnl & OS stimulus V, fovea 24, generalized depression.  01/28/2021 HVF 24-2 OD fovea 40, wnl MD -0.63 & OS stimulus V, fovea 28, superior nasal step & inferior arcuate.  11/06/2020 HVF 24-2 OD fovea 32, possible inferior > superior arcuate MD -14.71 & OS stimulus V, fovea 10, generalized depression.  07/27/2020 HVF 24-2 OD fovea 39, wnl MD -2.45 & OS stimulus V, fovea 27, superior & inferior altitudinal.    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    The superior altitudinal vision loss of the right eye shows some improvement, but more importantly, no sign on MRI, examination or OCT of an underlying structural eye or optic nerve disease. Findings are consistent with functional vision loss, which she has had in the past. We discussed this likely origin versus repeating electrodiagonostic testing.    The left eye remains stable and consistent with prior non-arteritic anterior ischemic optic neuropathy.    I do not see recurrent optic disc edema to raise concerns for idiopathic intracranial hypertension exacerbation.    Plan    Continue counseling with Mr. Jose Francisco Thomas.  Continue with Dr. Bai for headache treatment.  Vasculopathic risk factor control.  Continue off acetazolamide.    Follow up in 1-2 months with HVF & OCT. (dilated 6/5/2024)      Below you will find my full examination. I appreciate the opportunity to see Jonathan Ayala today and to share in her care with you. Please contact me if you have questions for me regarding this visit or if I can be of assistance to  another of your patients with neuro-ophthalmological problems.    Sincerely,    Rob Mederos MD PhD    CC:   No Recipients      Base Eye Exam       Visual Acuity (Snellen - Linear)         Right Left    Dist cc 20/40 20/500    Dist ph cc 20/25 20/400      Correction: Glasses              Tonometry ([employer]mann Applanation, 2:15 PM)         Right Left    Pressure 18 14              Pupils         Dark Light Shape React APD    Right 5 3 Round Brisk None    Left 5 3 Round Brisk >1.8 log units              Visual Fields         Left Right    Restrictions Partial outer superior temporal, inferior temporal, superior nasal, inferior nasal deficiencies Total superior temporal, superior nasal deficiencies              Extraocular Movement         Right Left     Full, Ortho Full, Ortho              Neuro/Psych       Oriented x3: Yes    Mood/Affect: Normal                  Additional Tests       Color         Right Left    Ishihara 8 0                  Slit Lamp and Fundus Exam       External Exam         Right Left    External Normal Normal              Slit Lamp Exam         Right Left    Lids/Lashes Normal Normal    Conjunctiva/Sclera White and quiet White and quiet    Cornea Clear Clear    Anterior Chamber Deep and quiet Deep and quiet    Iris Round and reactive Round and reactive    Lens Clear Clear    Anterior Vitreous Normal Normal              Fundus Exam         Right Left    Disc Normal Pallor    C/D Ratio 0.15 0.15    Macula Normal Normal    Vessels Normal Normal    Periphery Normal Normal

## 2024-06-26 NOTE — PROGRESS NOTES
"Assessment and Plan    06/08/2021 +OKN response OD  09/30/2019 +OKN response OD    06/05/2024 MRI orbits with contrast, which I personally reviewed, shows right frontal encephalomalacia similar to prior imaging.    11/07/2023 MRI brain without contrast, by report from [facility], shows \"No acute intracranial abnormality including no evidence of an acute or recent brain parenchymal infarct. \"  11/07/2023 CTA head & neck & CT head without contrast, by report from [facility], show \"No acute abnormality of the cervical and intracranial vasculature.\" And \"No evidence of an acute intracranial process.     Focal RIGHT frontal lobe encephalomalacia deep to a sherley hole, new from   02/25/2020. \"  08/01/2023 MRV head, which I personally reviewed previously, shows no lesion.  07/29/2023 MRI brain & orbits with contrast, which I personally reviewed previously, shows right frontal encephalomalacia consistent with prior bolt.  Interval head imaging.  10/05/2022 CT head without contrast & CTA head & neck, by report from [place], show \" 1.   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  Overlying right-sided sherley hole.)  2.  Prominence of the ventricles and sulci for age.  \" and \"1. Similar appearing old areas of infarction involving the right and left frontal lobes extending to the convexities with underlying encephalomalacia at site of prior hemorrhage from 02/13/2022. Overlying right-sided sherley hole. Prominence of the ventricles and sulci for age. No evidence of acute infarction. No active hemorrhage. 2. No significant stenosis internal carotid arteries. 3. No significant stenosis of the intracranial vessels or evidence of aneurysm. 4. Aberrant right subclavian artery behind the esophagus with no dilation of the proximal esophagus.\"  09/25/2022 CT head without " contrast, which I personally reviewed previously, shows no lesion.  04/20/2022 CT head without contrast, which I personally reviewed previously, shows right frontal encephalomalacia judged stable by Radiology.  Interval head imaging.  09/10/2021 MRI brain with contrast, which I personally reviewed previously, shows no lesion.  09/09/2021 CTA head & neck, which I personally reviewed previously, shows no lesion.  09/09/2021 CT head without contrast, which I personally reviewed previously, shows no lesion.  08/11/2021 MRI brain & orbits with contrast & MRV head, which I personally reviewed previously, show left optic atrophy with Radiology noting “Punctate focus of enhancement seen along the left 7th-8th cranial nerve bundle at the level of the porus acusticus, indeterminate. Otherwise unremarkable MRI of the brain.”  Interval head imaging.  06/04/2021 MRI brain & orbits with contrast, which I personally reviewed previously, shows left optic atrophy.  Interval head imaging.  06/29/2017 MRI brain without contrast, which I personally reviewed previously, shows no lesion.  11/22/2007 CT head without contrast, which I personally reviewed previously, shows no lesion.    08/31/2023 lumbar puncture tube 1: , WBC 0, tube 4: RBC 6 & WBC 0, protein 91 & glucose 71.  08/11/2021 lumbar puncture opening pressure 18 cm water, tube 1: RBC 0, WBC 16 (86% L, 13% M), tube 4: RBC 0 & WBC 16 (92% L, 8% M), protein 71 & glucose 61. Cytology negative. Flow cytometry negative. Oligoclonal bands 0. IgG studies with high CSF IgG & albumin.  08/05/2020 lumbar puncture opening pressure 20 cm water, protein 68, glucose 85. MBP wnl. Oligoclonal bands POSITIVE 4.  12/26/2019 lumbar puncture no opening pressure checked per patient) tube ?: RBC 39, WBC 6 (91% L, 9% M), tube ?: RBC 38, WBC 5, protein 89, glucose 79. (via [employer] from [employer])  11/13/2019 lumbar puncture opening pressure 22 cm water, tube 1: RBC 9, WBC 4, tube 4: RBC 1 & WBC 5,  protein 82 & glucose 61. Oligoclonal bands 0. IgG studies with non-specific abnormalities. HSV PCR negative.  09/20/2019 lumbar puncture opening pressure 20 cm water, RBC 1, WBC 7 (96% L, 4% M), protein 94 & glucose 78.  01/31/2015 lumbar puncture RBC 2, WBC 0, protein 104 & glucose 74.    07/27/2019 multifocal ERG with mildly reduced central responses.  Pattern VEP with OD borderline latency at 0.25 degrees and OS with severely prolonged latencies and decreased amplitudes.    Lab Results   Component Value Date/Time    SEDRATE 19 08/01/2023 1558    SEDRATE 33 (H) 08/07/2022 0322    SEDRATE 19 05/25/2022 1501    SEDRATE 3 06/05/2020 1110    SEDRATE 6 05/13/2020 1529    SEDRATE 3 03/28/2020 0629    SEDRATE 5 09/16/2019 0507    SEDRATE 8 08/19/2019 1314    CRP 0.90 08/07/2022 0322    CRP 0.35 05/25/2022 1501    CRP 0.34 09/23/2021 0618    CRP 0.71 09/21/2021 0648    CRP 0.14 07/16/2020 0944    CRP 0.10 06/05/2020 1110    CRP <0.10 05/13/2020 1529    CRP <0.10 03/28/2020 0629    CRP 3.64 (A) 11/21/2019 2157    CRP <0.10 08/19/2019 1314      Lab Results   Component Value Date/Time    WEADMZIY09 299 02/23/2023 0941    UFHMGNIK13 709 02/09/2021 0451    HABJOSBF52 508 12/10/2019 1349    RCFOL 951 12/10/2019 1349      Lab Results   Component Value Date/Time    NMOAQP4 Negative 11/14/2019 1447    MOGFACS Negative 11/10/2020 1000    MOGFACS Negative 11/14/2019 1447    ACE 26 11/10/2020 1000      Tuberculosis studies  Lab Results   Component Value Date/Time    TBSIN Negative 09/22/2021 0430    TBSIN Negative 11/10/2020 1000    TBSIN Negative 11/14/2019 0531      12/04/2023 ESR 10. CRP <0.3 mg/dL.  10/01/2023 syphilis non-reactive (NR).  09/18/2020 ESR 1. CRP < 0.08 mg/dL (0.8 mg/L).  08/28/2020 HbA1c HIGH 7.0. Lipid panel with LDL 64.  08/05/2020 B12 462. Folate wnl. AQP4 ab negative.  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 Suarez.  07/09/2019 BRETT > 1:640. Anti-centromere HIGH 101 (0-40). scl-70  negative. Chromatin ab IgG, anti-ribosomal ab, anti-Sarahy ab, anti-DNA ab negative.    06/26/2024 OCT RNFL OD 95 & OS 41. (Stable)  06/05/2024 OCT RNFL OD 95 & OS 40. (Stable)  OCT macula OD normal foveal contour 264 & OS normal foveal contour 234.  03/15/2024 OCT RNFL OD 92 & OS 36. (Stable)  01/09/2024 OCT RNFL OD 89 & OS 36. (Stable)  02/06/2023 OCT RNFL OD 92 & OS 38. (stable)  OCT macula OD normal foveal contour 255 & OS thinning RNFL miscentered wrt fovea.  10/07/2022 OCT RNFL OD 92 & OS 40. (decreased OD & stable OS)  09/23/2022 OCT RNFL OD 98 & OS 38. (increased OD & stable OS)  01/27/2022 OCT RNFL OD 88 & OS 37. (stable)  10/06/2021 OCT RNFL OD 93 & OS 39 (stable)..  08/19/2021 OCT RNFL OD 92 & OS 38. (stable)  06/08/2021 OCT RNFL OD 87 & OS 37. (stable)  01/28/2021 OCT RNFL OD 85 & OS 37. (stable)  11/06/2020 OCT RNFL OD 89 & OS 39. (stable)  07/27/2020 OCT RNFL OD 89 & OS 38. (stable)  01/07/2020 OCT RNFL OD 93 & OS 38. (stable to mild increase OD, stable to mild decrease OS)  11/27/2019 OCT RNFL OD 84 & OS 42. (stable to mild OD decrease)  09/30/2019 OCT RNFL OD 89 & OS 41. (stable)  07/18/2019 OCT RNFL OD 90 & OS 39.  OCT macula OD normal foveal contour 256 & OS thinning of RNFL & GCL, but preserved foveal contour 238.    06/26/2024 HVF 24-2 OD fovea 33, FL 3/15, FP 0%, FN 20%, superior arcuate MD -13.69 & OS stimulus V, fovea 27, generalized depression.  06/05/2024 HVF 24-2 OD fovea 34, FL 2/16, FP 0%, FN 40%, superior altitudinal MD -18.00 & OS stimulus V, fovea 25, generalized depression.  03/15/2024 HVF 24-2 OD fovea 36, wnl MD -1.05 & OS stimulus V, fovea 3, inferior arcuate > superior arcuate.  01/09/2024 HVF 24-2 OD wnl MD -0.94 & OS generalized depression MD -32.10.  02/06/2023 HVF 24-2 OD fovea 36, FL 1/17, FP 0%< FN 15%, scatter MD -7.58 & OS stimulus V, fovea 19, generalized depression.  10/07/2022 HVF 24-2 OD fovea 35, wnl MD -1.26 & OS fovea 5, generalized depression MD -30.62.  09/23/2022 F  24-2 OD fovea 38, scatter MD -2.67 & OS stimulus V, fovea 16, superior arcuate & inferior arcuate.  01/27/2022 HVF 24-2 OD fovea 36, wnl MD -1.67 & OS stimulus V, fovea 28, generalized reduction.  10/06/2021 HVF 24-2 OD fovea 36, scatter MD -4.76 & OS stimulus V, generalized reduction.  08/19/2021 HVF 24-2 OD fovea 39, wnl MD -2.31 & OS stimulus V, fovea 28, generalized depression.  06/08/2021 HVF Stimulus V OD fovea 34, wnl & OS stimulus V, fovea 24, generalized depression.  01/28/2021 HVF 24-2 OD fovea 40, wnl MD -0.63 & OS stimulus V, fovea 28, superior nasal step & inferior arcuate.  11/06/2020 HVF 24-2 OD fovea 32, possible inferior > superior arcuate MD -14.71 & OS stimulus V, fovea 10, generalized depression.  07/27/2020 HVF 24-2 OD fovea 39, wnl MD -2.45 & OS stimulus V, fovea 27, superior & inferior altitudinal.    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    The superior altitudinal vision loss of the right eye shows some improvement, but more importantly, no sign on MRI, examination or OCT of an underlying structural eye or optic nerve disease. Findings are consistent with functional vision loss, which she has had in the past. We discussed this likely origin versus repeating electrodiagonostic testing.    The left eye remains stable and consistent with prior non-arteritic anterior ischemic optic neuropathy.    I do not see recurrent optic disc edema to raise concerns for idiopathic intracranial hypertension exacerbation.    Plan    Continue counseling with Mr. Jose Francisco Thomas.  Continue with Dr. Bai for headache treatment.  Vasculopathic risk factor  control.  Continue off acetazolamide.    Follow up in 1-2 months with HVF & OCT. (dilated 6/5/2024)

## 2024-06-27 ENCOUNTER — APPOINTMENT (OUTPATIENT)
Dept: ENDOCRINOLOGY | Facility: CLINIC | Age: 49
End: 2024-06-27
Payer: MEDICAID

## 2024-06-27 VITALS — BODY MASS INDEX: 46.19 KG/M2 | HEIGHT: 62 IN | WEIGHT: 251 LBS

## 2024-06-27 DIAGNOSIS — E11.69 TYPE 2 DIABETES MELLITUS WITH OTHER SPECIFIED COMPLICATION, WITH LONG-TERM CURRENT USE OF INSULIN (MULTI): Primary | ICD-10-CM

## 2024-06-27 DIAGNOSIS — Z71.3 DIETARY COUNSELING: ICD-10-CM

## 2024-06-27 DIAGNOSIS — Z79.4 TYPE 2 DIABETES MELLITUS WITH OTHER SPECIFIED COMPLICATION, WITH LONG-TERM CURRENT USE OF INSULIN (MULTI): Primary | ICD-10-CM

## 2024-06-27 PROCEDURE — 97802 MEDICAL NUTRITION INDIV IN: CPT | Performed by: DIETITIAN, REGISTERED

## 2024-06-27 NOTE — PATIENT INSTRUCTIONS
- Please refer to your book entitled: Your Mediterranean Meal Plan, and follow Mediterranean Diet eating guidelines as reviewed during your visit. Remember, avoiding all foods that are an allergen for you is to be maintained and does not equate to inadequately following the Mediterranean diet.  - Consider pre-planning healthy meals for the week. Refer to your book for both menu and recipe ideas to get you started.  - Further recipes can also be found at: [email or web address], and at [email or web address]   - Continue to take medications as prescribed and monitor blood sugar levels daily.  - Follow-up with nutrition on 8/29/24 at 2:15 PM.

## 2024-06-27 NOTE — PROGRESS NOTES
"Initial Nutrition Assessment    Patient was referred to nutrition by Dr. Felix for education on healthy eating for consideration of Type 2 DM. Other PMHX significant for Obesity, HTN, HLD, Hypothyroidism, IBS-D, BRENT, GERD and Depression. Pertinent labs reviewed which show A1c of 7.9%, CHOL of 204 mg/dL and Trig of 272 mg/dL. Pt currently wears the Dexcom sensor for daily BG monitoring with noted average glucose of 191 mg/dL and TIR at 43%. Will soon be placed on the Omnipod insulin pump. Currently using [facility] and utilizing an ICR of 1:3. Pt reports actively CHO counting and very knowledgeable with such. States she is very interested in following the Mediterranean diet but unsure of where to begin. See all interventions/recommendations below as discussed during visit this day.     Anthropometrics:  Height:   Ht Readings from Last 1 Encounters:   06/27/24 1.575 m (5' 2\")      Weight:   Wt Readings from Last 10 Encounters:   06/27/24 114 kg (251 lb)   06/18/24 114 kg (251 lb 6.4 oz)   06/05/24 111 kg (245 lb)   06/03/24 111 kg (245 lb)   04/04/24 115 kg (253 lb)   02/22/24 104 kg (229 lb)   02/06/24 103 kg (228 lb)   01/04/24 101 kg (222 lb 14.4 oz)   11/14/23 109 kg (241 lb)   06/28/23 109 kg (240 lb)      Current BMI:   BMI Readings from Last 1 Encounters:   06/27/24 45.91 kg/m²        Labs:  Lab Results   Component Value Date    HGBA1C 7.9 (H) 04/18/2024      Lab Results   Component Value Date    CHOL 204 (H) 04/18/2024    CHOL CANCELED 02/23/2023    CHOL 186 02/08/2021     Lab Results   Component Value Date    HDL 46.5 04/18/2024    HDL CANCELED 02/23/2023    HDL 46.3 02/08/2021     Lab Results   Component Value Date    LDLCALC 103 (H) 04/18/2024     Lab Results   Component Value Date    TRIG 272 (H) 04/18/2024    TRIG CANCELED 02/23/2023    TRIG 158 (H) 02/08/2021       Malnutrition Screening:  Significant Unintentional weight loss: No  Eating less than 75% of usual intake for more than 2 weeks: No  Potential " Signs of Inflammation: No    Recommended Malnutrition Diagnosis: No malnutrition identified    Patient reported Information:  - Has increased allergies (aspartame, milk, wheat, strawberries, walnuts, pecans, and rosemary) so is careful to avoid these items.  - States increased visits with dietitians over the years but frustrated that with current allergies, nutrition recommendations have not been very helpful.  - Interested in following the Mediterranean diet but unsure of where to begin and questions if it is a good fit with allergies/diagnoses.  - Has Type 2 DM, currently with [facility] for management. Actively CHO counting utilizing ICR of 1:3.  - Awaiting Omnipod insulin pump start soon. Was previously on the Tandem insulin pump.  - Is interested in increased recipes to assist with implementing healthy eating.    Nutrition Diagnosis: Food and nutrition-related knowledge deficit related to lack of prior exposure to information as evidenced by patient has no prior knowledge of need for food and nutrition-related recommendations.    Readiness to Learn:  Cognitive ability: Alert and oriented  Motivation to learn: Interested  Family Support: Unable to assess- family not present  Instruction provided to: Patient  Patient learns best by: Multiple methods  Factors affecting learning: None   Physical limitations affecting learning: None    Education Materials Provided:   Your Mediterranean Meal Plan Booklet    Nutrition Interventions/Recommendations for 6/27/2024:  - Please refer to your book entitled: Your Mediterranean Meal Plan, and follow Mediterranean Diet eating guidelines as reviewed during your visit. Remember, avoiding all foods that are an allergen for you is to be maintained and does not equate to inadequately following the Mediterranean diet.  - Consider pre-planning healthy meals for the week. Refer to your book for both menu and recipe ideas to get you started.  - Further recipes can also be found at:  [email or web address], and at https://www.diabetesfoodhub.org   - Continue to take medications as prescribed and monitor blood sugar levels daily.  - Follow-up with nutrition on 8/29/24 at 2:15 PM.      Nutrition Monitoring & Evaluation: adherence to recommendations and patient stated goals    Need for follow-up: Individual Nutrition Visit in 4-6 weeks    Referred by: Dr. Felix    MNT Billing Type: Medical Nutrition Assessment, each 15 min increment, for 3 increments.    SIGNATURE:   Trinidad Rios RD, JEET, LD, [facility]                                                        DATE:   6/27/2024

## 2024-07-01 PROCEDURE — RXMED WILLOW AMBULATORY MEDICATION CHARGE

## 2024-07-03 ENCOUNTER — PHARMACY VISIT (OUTPATIENT)
Dept: PHARMACY | Facility: CLINIC | Age: 49
End: 2024-07-03
Payer: MEDICAID

## 2024-07-03 ENCOUNTER — APPOINTMENT (OUTPATIENT)
Dept: PRIMARY CARE | Facility: CLINIC | Age: 49
End: 2024-07-03
Payer: MEDICAID

## 2024-07-05 DIAGNOSIS — J45.40 MODERATE PERSISTENT ALLERGIC ASTHMA (HHS-HCC): Primary | ICD-10-CM

## 2024-07-05 RX ORDER — MONTELUKAST SODIUM 10 MG/1
10 TABLET ORAL NIGHTLY
Qty: 90 TABLET | Refills: 0 | Status: SHIPPED | OUTPATIENT
Start: 2024-07-05

## 2024-07-09 ENCOUNTER — APPOINTMENT (OUTPATIENT)
Dept: OPHTHALMOLOGY | Facility: CLINIC | Age: 49
End: 2024-07-09
Payer: MEDICAID

## 2024-07-09 ENCOUNTER — HOSPITAL ENCOUNTER (EMERGENCY)
Facility: HOSPITAL | Age: 49
Discharge: HOME | End: 2024-07-09
Payer: MEDICAID

## 2024-07-09 ENCOUNTER — APPOINTMENT (OUTPATIENT)
Dept: CARDIOLOGY | Facility: HOSPITAL | Age: 49
End: 2024-07-09
Payer: MEDICAID

## 2024-07-09 ENCOUNTER — APPOINTMENT (OUTPATIENT)
Dept: RADIOLOGY | Facility: HOSPITAL | Age: 49
End: 2024-07-09
Payer: MEDICAID

## 2024-07-09 VITALS
OXYGEN SATURATION: 97 % | DIASTOLIC BLOOD PRESSURE: 100 MMHG | WEIGHT: 250 LBS | RESPIRATION RATE: 16 BRPM | HEART RATE: 97 BPM | TEMPERATURE: 98.3 F | SYSTOLIC BLOOD PRESSURE: 137 MMHG | HEIGHT: 62 IN | BODY MASS INDEX: 46.01 KG/M2

## 2024-07-09 DIAGNOSIS — H66.91 RIGHT OTITIS MEDIA, UNSPECIFIED OTITIS MEDIA TYPE: ICD-10-CM

## 2024-07-09 DIAGNOSIS — R51.9 NONINTRACTABLE HEADACHE, UNSPECIFIED CHRONICITY PATTERN, UNSPECIFIED HEADACHE TYPE: Primary | ICD-10-CM

## 2024-07-09 PROCEDURE — 2500000004 HC RX 250 GENERAL PHARMACY W/ HCPCS (ALT 636 FOR OP/ED): Performed by: PHYSICIAN ASSISTANT

## 2024-07-09 PROCEDURE — 96375 TX/PRO/DX INJ NEW DRUG ADDON: CPT

## 2024-07-09 PROCEDURE — 93005 ELECTROCARDIOGRAM TRACING: CPT

## 2024-07-09 PROCEDURE — 99284 EMERGENCY DEPT VISIT MOD MDM: CPT | Mod: 25

## 2024-07-09 PROCEDURE — 96374 THER/PROPH/DIAG INJ IV PUSH: CPT

## 2024-07-09 PROCEDURE — 70450 CT HEAD/BRAIN W/O DYE: CPT

## 2024-07-09 PROCEDURE — 70450 CT HEAD/BRAIN W/O DYE: CPT | Performed by: STUDENT IN AN ORGANIZED HEALTH CARE EDUCATION/TRAINING PROGRAM

## 2024-07-09 RX ORDER — DIPHENHYDRAMINE HYDROCHLORIDE 50 MG/ML
25 INJECTION INTRAMUSCULAR; INTRAVENOUS ONCE
Status: COMPLETED | OUTPATIENT
Start: 2024-07-09 | End: 2024-07-09

## 2024-07-09 RX ORDER — AMOXICILLIN 500 MG/1
500 CAPSULE ORAL 3 TIMES DAILY
Qty: 21 CAPSULE | Refills: 0 | Status: SHIPPED | OUTPATIENT
Start: 2024-07-09 | End: 2024-07-16

## 2024-07-09 RX ORDER — METOCLOPRAMIDE HYDROCHLORIDE 5 MG/ML
10 INJECTION INTRAMUSCULAR; INTRAVENOUS ONCE
Status: COMPLETED | OUTPATIENT
Start: 2024-07-09 | End: 2024-07-09

## 2024-07-09 RX ORDER — HEPARIN 100 UNIT/ML
5 SYRINGE INTRAVENOUS ONCE
Status: COMPLETED | OUTPATIENT
Start: 2024-07-09 | End: 2024-07-09

## 2024-07-09 ASSESSMENT — LIFESTYLE VARIABLES
EVER FELT BAD OR GUILTY ABOUT YOUR DRINKING: NO
HAVE YOU EVER FELT YOU SHOULD CUT DOWN ON YOUR DRINKING: NO
HAVE PEOPLE ANNOYED YOU BY CRITICIZING YOUR DRINKING: NO
EVER HAD A DRINK FIRST THING IN THE MORNING TO STEADY YOUR NERVES TO GET RID OF A HANGOVER: NO
TOTAL SCORE: 0

## 2024-07-09 ASSESSMENT — PAIN DESCRIPTION - FREQUENCY: FREQUENCY: INTERMITTENT

## 2024-07-09 ASSESSMENT — PAIN - FUNCTIONAL ASSESSMENT: PAIN_FUNCTIONAL_ASSESSMENT: 0-10

## 2024-07-09 ASSESSMENT — COLUMBIA-SUICIDE SEVERITY RATING SCALE - C-SSRS
1. IN THE PAST MONTH, HAVE YOU WISHED YOU WERE DEAD OR WISHED YOU COULD GO TO SLEEP AND NOT WAKE UP?: NO
2. HAVE YOU ACTUALLY HAD ANY THOUGHTS OF KILLING YOURSELF?: NO
6. HAVE YOU EVER DONE ANYTHING, STARTED TO DO ANYTHING, OR PREPARED TO DO ANYTHING TO END YOUR LIFE?: NO

## 2024-07-09 ASSESSMENT — PAIN DESCRIPTION - LOCATION: LOCATION: JAW

## 2024-07-09 ASSESSMENT — PAIN SCALES - GENERAL: PAINLEVEL_OUTOF10: 6

## 2024-07-09 NOTE — ED PROVIDER NOTES
EMERGENCY MEDICINE EVALUATION NOTE    History of Present Illness     Chief Complaint:   Chief Complaint   Patient presents with    Jaw Pain       HPI: Jonathan Ayala is a 48 y.o. female presents with a chief complaint of pain on the right side of her face.  She reports she is having little pain in any of the right side of her jaw but it is now progressed to the entire right side of her face which is causing her to have a right-sided migraine.  Patient reports that she has no change in her vision, nausea, or vomiting.  She is that she has been trying to take Tylenol ibuprofen at home however she has been told not to take as much ibuprofen as they are trying to get her migraines under control without using it.  Patient reports that she also has felt like her right ear has been slightly muffled.  She states that the pain radiates throughout their entire right side of her head.  She denies any injury to the area.  She denies any fevers or chills.  She denies any neck or back pain.  Once again patient states that she does have a history of migraine headaches but they do not normally progress in this pattern.    Previous History     Past Medical History:   Diagnosis Date    Abnormal findings on diagnostic imaging of other abdominal regions, including retroperitoneum 10/14/2020    Abnormal CT of the abdomen    Acquired deformity of nose 03/24/2022    Nasal deformity    Acute upper respiratory infection, unspecified 10/16/2019    Acute URI    Allergy status to unspecified drugs, medicaments and biological substances 05/22/2020    History of drug allergy    Allergy status to unspecified drugs, medicaments and biological substances 11/13/2020    History of adverse drug reaction    Benign intracranial hypertension 01/27/2022    Pseudotumor cerebri    Bipolar disorder, unspecified (Multi)     Bipolar disease, chronic    Breast calcification, right 08/21/2018    Cellulitis of abdominal wall 09/28/2022    Cellulitis of right  abdominal wall    Cervicalgia 07/01/2020    Cervicalgia of jpafaebb-zrwkbur-kzgat region    Chronic maxillary sinusitis 01/04/2022    Chronic maxillary sinusitis    Chronic sialoadenitis 03/16/2020    Chronic sialoadenitis    COVID-19 01/06/2022    COVID-19 with multiple comorbidities    Decreased white blood cell count, unspecified 11/04/2019    Leukopenia    Disease of thyroid gland     Disturbances of salivary secretion 03/16/2020    Xerostomia    Dry eye syndrome of bilateral lacrimal glands 10/07/2022    Dry eyes, bilateral    Encounter for preprocedural cardiovascular examination 02/01/2022    Preoperative cardiovascular examination    Food additives allergy status 06/11/2020    Allergy to food dye    Fracture of nasal bones, initial encounter for closed fracture 03/03/2022    Closed fracture of nasal bone, initial encounter    Granuloma of right orbit 10/07/2021    Inflammatory pseudotumor of right orbit    History of endometrial ablation 11/09/2017    Hyperlipidemia     Hypertension     Hyperthyroidism     Hypoglycemia     Hypothyroidism     Localized swelling, mass and lump, head 03/24/2022    Swollen nose    Major depressive disorder, recurrent, in full remission ([facility]) 10/07/2021    Depression, major, recurrent, in complete remission    Mammary duct ectasia of left breast 08/24/2022    Periductal mastitis of left breast    Migraine     Nipple discharge 08/24/2022    Bloody discharge from left nipple    Ocular pain, right eye 10/07/2022    Pain in right eye    Optic atrophy     Other abnormal and inconclusive findings on diagnostic imaging of breast 07/06/2020    Other abnormal and inconclusive findings on diagnostic imaging of breast    Other anomalies of pupillary function 05/31/2019    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    Other chest pain 05/18/2020    Chest discomfort    Other conditions influencing health status 08/01/2022    History of cough    Other conditions influencing health  status 08/03/2021    Chronic migraine    Other specified disorders of eustachian tube, left ear 11/18/2019    ETD (Eustachian tube dysfunction), left    Other specified disorders of nose and nasal sinuses 03/24/2022    Nasal dryness    Other specified disorders of nose and nasal sinuses 03/24/2022    Nasal crusting    Pelvic and perineal pain 07/06/2020    Pelvic pain    Personal history of other diseases of the circulatory system 04/07/2020    History of sinus tachycardia    Personal history of other diseases of the circulatory system 04/07/2020    History of abnormal electrocardiography    Personal history of other diseases of the circulatory system     History of Raynaud's syndrome    Personal history of other diseases of the digestive system     History of irritable bowel syndrome    Personal history of other diseases of the digestive system 03/02/2020    History of oral pain    Personal history of other diseases of the musculoskeletal system and connective tissue 01/19/2022    History of neck pain    Personal history of other diseases of the musculoskeletal system and connective tissue 03/02/2021    History of scleroderma    Personal history of other diseases of the musculoskeletal system and connective tissue 06/16/2020    History of muscle weakness    Personal history of other diseases of the nervous system and sense organs 11/18/2019    History of hearing loss    Personal history of other diseases of the nervous system and sense organs 09/21/2022    History of partial seizures    Personal history of other diseases of the respiratory system 04/14/2021    History of asthma    Personal history of other endocrine, nutritional and metabolic disease 02/17/2021    History of diabetes mellitus    Personal history of other mental and behavioral disorders 05/27/2021    History of anxiety    Personal history of other specified conditions 09/07/2022    History of nipple discharge    Personal history of other specified  conditions 10/16/2019    History of headache    Personal history of other specified conditions 09/28/2022    History of lump of left breast    Personal history of other specified conditions 09/16/2021    History of persistent cough    Personal history of other specified conditions 02/01/2022    History of palpitations    Personal history of other specified conditions 03/09/2022    History of headache    Personal history of other specified conditions 02/12/2014    History of chest pain    Personal history of other specified conditions 10/27/2021    History of nausea and vomiting    Personal history of other specified conditions 10/16/2019    History of fatigue    Personal history of other specified conditions 02/26/2021    History of orthopnea    Personal history of other specified conditions 02/22/2021    History of shortness of breath    Personal history of urinary calculi     H/O renal calculi    Polycystic ovary syndrome     Postural orthostatic tachycardia syndrome (POTS)     POTS (postural orthostatic tachycardia syndrome)    Rash and other nonspecific skin eruption 03/15/2022    Rash    Repeated falls 06/23/2021    Recurrent falls    Right lower quadrant pain 10/14/2020    Abdominal pain, RLQ (right lower quadrant)    Sjogren syndrome, unspecified (Multi)     History of Sjogren's disease    Sjogren's syndrome (Multi)     Slow transit constipation 07/09/2020    Slow transit constipation    Subarachnoid hemorrhage, traumatic (Multi) 04/19/2023    Thyroid nodule     Traumatic subarachnoid hemorrhage with loss of consciousness of unspecified duration, subsequent encounter 03/15/2022    Subarachnoid hemorrhage following injury, with loss of consciousness, subsequent encounter    Traumatic subarachnoid hemorrhage without loss of consciousness, subsequent encounter     Subarachnoid hemorrhage following injury, no loss of consciousness, subsequent encounter    Type 2 diabetes mellitus (Multi)     Unspecified disorder  of refraction 10/07/2022    Refractive error    Unspecified optic neuritis 11/06/2020    Right optic neuritis    Unspecified optic neuritis 11/06/2020    Optic neuritis, right    Unspecified visual loss 09/25/2019    Vision loss    Venous insufficiency (chronic) (peripheral) 10/18/2021    Chronic venous insufficiency of lower extremity    Viral infection, unspecified 01/11/2022    Nonspecific syndrome suggestive of viral illness    Vitamin D deficiency     Vitamin D deficiency, unspecified 09/28/2022    Vitamin D deficiency     Past Surgical History:   Procedure Laterality Date    APPENDECTOMY  2017    HERNIA REPAIR Right 03/01/2024    with mesh    HYSTERECTOMY  2017    MR HEAD ANGIO WO IV CONTRAST  02/08/2021    MR HEAD ANGIO WO IV CONTRAST 2/8/2021 [facility]    MR NECK ANGIO WO IV CONTRAST  02/08/2021    MR NECK ANGIO WO IV CONTRAST 2/8/2021 [facility]    OTHER SURGICAL HISTORY  08/22/2019    Carpal tunnel surgery    OTHER SURGICAL HISTORY  08/22/2019    Hysterectomy    OTHER SURGICAL HISTORY  08/22/2019    Venous access port placement    OTHER SURGICAL HISTORY  08/22/2019    Cholecystectomy    OTHER SURGICAL HISTORY  08/22/2019    Appendectomy    OTHER SURGICAL HISTORY  08/22/2019    Pyloroplasty    WISDOM TOOTH EXTRACTION  2004     Social History     Tobacco Use    Smoking status: Never    Smokeless tobacco: Never   Vaping Use    Vaping status: Never Used   Substance Use Topics    Alcohol use: Not Currently     Comment: Socially in College    Drug use: Yes     Types: Benzodiazepines     Family History   Problem Relation Name Age of Onset    Other (Perforated bowel) Mother Radha     Hypertension Mother Radha     Macular degeneration Mother Radha     Hyperlipidemia Father Mac     Hypertension Father Mac     Heart attack Father Mac     Other (S/P CABG) Father Mac     Diabetes Father Mac     Prostate cancer Father Mac     Coronary artery disease Father Mac     Heart failure  Father Mac     Stroke Father Mac     Cancer Father Mac     Macular degeneration Father Mac     Retinal detachment Father Mac     Stroke Sister Jazmin     Breast cancer Sister Jazmin     Lupus Sister Jazmin     Ovarian cancer Sister Jazmin     Astigmatism Sister Jazmin     Hyperlipidemia Brother Sanchez     Hypertension Brother Sanchez     Astigmatism Brother Sanchez     Diabetes Father's Sister Linda     Blindness Father's Sister Linda     Thyroid cancer Father's Sister Bekah     Thyroid disease Father's Sister Jessica     Colon cancer Father's Brother ?     Blindness Other Multiple     Melanoma Other      Asthma Other      Other (hay fever) Other      Allergies Other      Cancer Maternal Grandmother Brenda     Cancer Father's Brother Kian      Allergies   Allergen Reactions    Acetazolamide Hives, Rash, Shortness of breath and Swelling     oral hives, redness, ABD pain    Atorvastatin Unknown     Causes muscle pain    Cefdinir Itching, Rash, Shortness of breath and Anaphylaxis     Itchy throat    Throat closing / difficulty breathing.  Took Benadryl at home.    Itchy throat      Throat closing / difficulty breathing.  Took Benadryl at home.    Ceftriaxone Anaphylaxis, Rash, Shortness of breath and Swelling     SOB    SOB hives turned red during gallbladder    Doxepin Anaphylaxis, Hives, Swelling, Angioedema and Rash     Itchy sore throat problems with swallowing    facial swelling    Itchy sore throat problems with swallowing      facial swelling    Duloxetine Anaphylaxis, Hallucinations and Rash     suicidal ideation    suicidal ideation     Other reaction(s): suicidal ideation    suicidal    Suicidal ideation    Levofloxacin Angioedema, Swelling and Rash     eyelid swelling    eyelid swelling. Patient tolerates Avelox    Levofloxacin In D5w Anaphylaxis     Moon face lips swelling just Levaquin tolerated D5W)    Nutritional Supplements Anaphylaxis     Grapes ( red dye    Ozempic [Semaglutide] Nausea/vomiting     Severe  "gastroparesis worsening     Prochlorperazine Anaphylaxis     HIGH Compazine involuntary muscle spasms low doses tolerated)    Red Dye Anaphylaxis    Becky Anaphylaxis and Swelling     Becky    SOB facial swelling    Rosemary Oil Anaphylaxis, Itching and Swelling     Becky  SOB facial swelling      SOB facial swelling    Strawberry Shortness of breath     White blisters in mouth      Other reaction(s): white blisters in mouth, shortness of breath    Sulfa (Sulfonamide Antibiotics) Anaphylaxis, Rash, Shortness of breath and Swelling     SOB itchy hives    Other Reaction(s): rash, SOB      SOB itchy hives    Topiramate Anaphylaxis, Swelling and Angioedema     eyelid swelling    Throat swelling    eyelid swelling  Throat swelling      Throat swelling      eyelid swelling    Tree Pollen-Black [place] Shortness of breath     Other Reaction(s): Other: See Comments      White blisters in mouth      blisters in mouth-carries an EPIPEN-\"I have gotten short of breath\"    Tree Pollen-Pecan Shortness of breath     pecans    Zegotpof-3-[id number] Antimigraine Agents Anaphylaxis and Nausea Only     \"[id number]- anti migraine agents and DHE: can cause stroke per pt \"    None due to Raynaud's  ( Triptans cause a stroke)    [place] Shortness of breath    Aripiprazole Unknown, Fever and Other     fever and tremors    Fevers and Tremors    Tremor    Aspartame Diarrhea     Blood sugar [place], Diarrhea    Aspartame (Bulk) Diarrhea, Nausea Only and Nausea/vomiting     Other Reaction(s): nausea, diarrhea, abdominal pain      Blood sugar [place], Diarrhea    Fenofibrate Other     Double vision    Gluten Itching, Other and Rash     Rashes constipation gastric discomfort    Hydrochlorothiazide Hives, Itching and Rash     hctz removed as free-text allergy and entered as [facility] allergy,1455 10/6/2007JO      itchy hives    Hydromorphone Unknown, GI intolerance and Nausea Only     Intolerance nausea instant    Iron Hives and Rash     ichy hive " ( can tolerate ferrous gluconate)    Meclizine Angioedema, Other and Swelling     eyelid swelling    Swelling of the eyelids    Eyelids and face    Metformin Other     Other reaction(s): Dyspepsia, Dyspepsia    Propoxyphene Unknown and Hives     Darvocet itchy hives    Statins-Hmg-Coa Reductase Inhibitors Unknown     Double vision    Tetracyclines Hives, Itching and Rash     sulfa    Rash itching    Itchy  rash    Thiazides Hives, Itching and Rash     itchy hives    Venlafaxine Unknown and Fever     Fevers chills    Wheat Unknown     oral blisters    Adhesive Tape-Silicones Itching and Other    Barbiturates Unknown    Ciprofloxacin Hives    Dhe Unknown     Raynaud's disease    Diet Foods Diarrhea     Aspartame allergy only. Removes to many foods to interface.    Farxiga [Dapagliflozin] Other     Frequent yeast infections and UTI    Ferrous Sulfate Hives    Nsaids (Non-Steroidal Anti-Inflammatory Drug) Unknown and Nausea/vomiting    Other Unknown    Sulfonylureas Unknown    Tobramycin Unknown    Ubrelvy [Ubrogepant] Hives    Vancomycin Unknown and Hives     Niyah syndrome    Other reaction(s): Other    Red man syndrome if given fast, benadryl with.     Niyah syndrome  Other reaction(s): Other      Other reaction(s): Other      Red man syndrome if given fast, benadryl with.    Adhesive Rash    Azithromycin Hives, Itching and Rash    Betamethasone Nausea And Vomiting, Other, GI intolerance and Diarrhea     N/V/D    House Dust Rash    Metoclopramide Hcl Other    Milk Unknown, Itching and Rash     ABD pain    Prednisone Rash    Propoxyphene-Acetaminophen Hives and Rash    Sulfacetamide Sodium Rash and Swelling    Zolpidem Other and Hallucinations     Sleep walk    Other Reaction(s): insomnia and agitation      Sleep walk     Current Outpatient Medications   Medication Instructions    amitriptyline (ELAVIL) 50 mg, oral, Nightly    amLODIPine (NORVASC) 5 mg, oral, Daily    amoxicillin (AMOXIL) 500 mg, oral, 3 times daily  "   ascorbic acid (VITAMIN C) 1,000 mg, oral, Daily    BD Ultra-Fine Mini Pen Needle 31 gauge x 3/16\" needle USE AS DIRECTED FIVE TIMES A DAY.    carboxymethylcellulose (Refresh Celluvisc) 1 % ophthalmic solution dropperette ophthalmic (eye)    cholecalciferol (Vitamin D-3) 5,000 Units tablet oral    clonazePAM (KlonoPIN) 0.5 mg tablet 1 tablet, oral, 2 times daily, at the same time.    clotrimazole (Lotrimin) 1 % cream     diclofenac (VOLTAREN) 50 mg, oral, 3 times daily PRN, 30 day supply    diphenhydrAMINE (BENADryl) 50 mg/mL injection     divalproex (Depakote ER) 500 mg 24 hr tablet 1 tablet, oral, 2 times daily    EPINEPHrine 0.3 mg/0.3 mL injection syringe INJECT INTRAMUSCULARLY ONCE AS NEEDED FOR ANAPHYLAXIS    erenumab (Aimovig) 140 mg/mL injection INJECT 140 MG (1 PEN) UNDER THE SKIN EVERY 30 DAYS    ezetimibe (ZETIA) 10 mg, oral, Daily    fluticasone (Flonase) 50 mcg/actuation nasal spray USE 1 SPRAY INTO EACH NOSTRIL ONCE DAILY.    folic acid (FOLVITE) 1 mg, oral, Daily    furosemide (LASIX) 20 mg, oral, 2 times daily    glucagon (Baqsimi) 3 mg/actuation spray,non-aerosol USE ONE SPRAY IN THE NOSE AS NEEDED FOR LOW BLOOD SUGAR  MAY REPEAT AFTER 15 MINUTES USING A NEW DEVICE IF NO RESPONSE    hydrocortisone (Cortef) 10 mg tablet TAKE ONE TABLET BY MOUTH TWO TIMES A DAY; TAKE DOUBLE DOSES FOR 3 DAYS WHEN ILL    immune globulin, human, (Gammagard) infusion     insulin glargine (Toujeo Max Solostar- 2 unit dial) 300 unit/mL (3 mL) injection Inject 50 units under skin once daily    insulin lispro (HumaLOG) 100 unit/mL injection subcutaneous    insulin pump cart,cont inf,BT (Omnipod Dash Pods, Gen 4,) cartridge subcutaneous    ipratropium-albuteroL (Duo-Neb) 0.5-2.5 mg/3 mL nebulizer solution 3 mL, inhalation, 4 times daily PRN    lacosamide (Vimpat) 200 mg tablet tablet 1 tablet, oral, 2 times daily    lacosamide (Vimpat) 50 mg tablet Take 1 tablet (50 mg) by mouth every 12 hours.    levETIRAcetam (KEPPRA) " 1,500 mg, oral, 2 times daily    levothyroxine (SYNTHROID, LEVOXYL) 75 mcg, oral, Daily before breakfast    montelukast (SINGULAIR) 10 mg, oral, Nightly    Motegrity 2 mg tablet 1 tablet, oral, Daily    nasal spray Nayzilam 5 mg/spray (0.1 mL) spray,non-aerosol nasal    OneTouch Delica Plus Lancet 33 gauge misc USE 1 LANCET TO CHECK GLUCOSE ONCE DAILY AS DIRECTED    OneTouch Verio test strips strip USE 1 STRIP TO CHECK GLUCOSE ONCE DAILY AS DIRECTED    pantoprazole (PROTONIX) 40 mg, oral, Daily, Do not crush, chew, or split.    promethazine (Phenergan) 12.5 mg tablet Take 1 tablet (12.5 mg) by mouth if needed.    propranolol LA (INDERAL LA) 60 mg, oral, Daily, Do not crush, chew, or split.    Qulipta 60 mg, oral, Daily    rOPINIRole (Requip) 1 mg tablet 1 tablet, oral, Daily    rOPINIRole (REQUIP) 0.5 mg, oral, Every morning    senna 8.6 mg tablet 1-2 tablets, oral, Nightly PRN    tiZANidine (ZANAFLEX) 2 mg, oral, Every 8 hours PRN    ZINC ORAL 100 mg, oral, Daily, Take as directed       Physical Exam     Appearance: Alert, oriented , cooperative,  in no acute distress.      Skin: Intact,  dry skin, no lesions, rash, petechiae or purpura.      Eyes: PERRLA, EOMs intact,  Conjunctiva pink      ENT: Hearing grossly intact. Pharynx clear, uvula midline.  Right-sided tympanic membrane erythematous, left side tympanic membrane within normal limits.  Patient does have tender lymph nodes on right side of neck on her jawline.     Neck: Supple. Trachea at midline.      Pulmonary: Clear bilaterally. No rales, rhonchi or wheezing. No accessory muscle use or stridor.     Cardiac: Normal rate and rhythm without murmur     Abdomen: Soft, nontender, active bowel sounds.     Musculoskeletal: Full range of motion.      Neurological:Cranial nerves II through XII are grossly intact, normal sensation, no weakness, no focal findings identified.  Normal gait.     Results   Labs Reviewed - No data to display  CT head wo IV contrast  "  Final Result   No acute intracranial abnormality.        Stable encephalomalacia in the right superior frontal lobe.        Signed by: John Mcnamara 7/9/2024 6:22 PM   Dictation workstation:   [id number]            ED Course & Medical Decision Making     Medications   diphenhydrAMINE (BENADryl) injection 25 mg (25 mg intravenous Given 7/9/24 1835)   metoclopramide (Reglan) injection 10 mg (10 mg intravenous Given 7/9/24 1836)     Heart Rate:  [97]   Temperature:  [36.8 °C (98.3 °F)]   Respirations:  [16]   BP: (137)/(100)   Height:  [157.5 cm (5' 2\")]   Weight:  [113 kg (250 lb)]   Pulse Ox:  [97 %]    ED Course as of 07/09/24 1907 Tue Jul 09, 2024 1755 I discussed with the patient that the patient's symptoms are likely caused from a right-sided ear infection.  Patient was concerned due to severity of the progression of the headache.  Due to this patient will receive CT imaging of the head.  Patient also received Reglan and Benadryl.  Patient can have Reglan as long as she has Benadryl with it.  Patient will be reassessed after CT imaging.  She manage patient's last dose of Motrin was about 3 to 4 hours prior to arrival so she cannot receive any Toradol at this time. [CJ]   1903 EKG performed at 655 PM.  Sinus rhythm with a ventricular rate of 80 bpm, pr interval 170 ms, QT/QTc of 395/470 ms.  No STEMI     [CJ]   1903 Reevaluated patient at this time.  Patient states he feels better after receiving medications.  Plan of care discussed with the patient.  CT of the head did not show any acute changes from previous CT. on examination patient has otitis media of right ear.  This is likely the source of the patient's pain which is induced a headache.  Patient be treated with amoxicillin to treat her acute otitis media.  Patient was urged to follow-up with her primary care provider 1 to 2 days return to emergency room immediately with any worsening symptoms. [CJ]      ED Course User Index  [CJ] Carter Masters PA-C "         Diagnoses as of 07/09/24 1907   Nonintractable headache, unspecified chronicity pattern, unspecified headache type   Right otitis media, unspecified otitis media type       Procedures   Procedures    Diagnosis     1. Nonintractable headache, unspecified chronicity pattern, unspecified headache type    2. Right otitis media, unspecified otitis media type        Disposition   Discharged     ED Prescriptions       Medication Sig Dispense Start Date End Date Auth. Provider    amoxicillin (Amoxil) 500 mg capsule Take 1 capsule (500 mg) by mouth 3 times a day for 7 days. 21 capsule 7/9/2024 7/16/2024 Carter Masters PA-C            Disclaimer: This note was dictated by speech recognition. Minor errors in transcription may be present. Please call if questions.       Carter Masters PA-C  07/09/24 1907

## 2024-07-11 ENCOUNTER — PATIENT MESSAGE (OUTPATIENT)
Dept: PRIMARY CARE | Facility: CLINIC | Age: 49
End: 2024-07-11
Payer: MEDICAID

## 2024-07-11 DIAGNOSIS — H66.3X9 CHRONIC SUPPURATIVE OTITIS MEDIA, UNSPECIFIED LATERALITY, UNSPECIFIED OTITIS MEDIA LOCATION: Primary | ICD-10-CM

## 2024-07-11 RX ORDER — AMOXICILLIN 400 MG/5ML
10 POWDER, FOR SUSPENSION ORAL 2 TIMES DAILY
Qty: 140 ML | Refills: 0 | Status: SHIPPED | OUTPATIENT
Start: 2024-07-11 | End: 2024-07-21

## 2024-07-14 ENCOUNTER — PATIENT MESSAGE (OUTPATIENT)
Dept: PRIMARY CARE | Facility: CLINIC | Age: 49
End: 2024-07-14
Payer: MEDICAID

## 2024-07-14 DIAGNOSIS — G47.01 INSOMNIA DUE TO MEDICAL CONDITION: Primary | ICD-10-CM

## 2024-07-14 LAB
ATRIAL RATE: 88 BPM
P AXIS: 52 DEGREES
PR INTERVAL: 177 MS
Q ONSET: 253 MS
QRS COUNT: 14 BEATS
QRS DURATION: 91 MS
QT INTERVAL: 395 MS
QTC CALCULATION(BAZETT): 478 MS
QTC FREDERICIA: 448 MS
R AXIS: 6 DEGREES
T AXIS: 70 DEGREES
T OFFSET: 450 MS
VENTRICULAR RATE: 88 BPM

## 2024-07-15 ENCOUNTER — APPOINTMENT (OUTPATIENT)
Dept: ENDOCRINOLOGY | Facility: CLINIC | Age: 49
End: 2024-07-15
Payer: MEDICAID

## 2024-07-15 DIAGNOSIS — Z79.4 TYPE 2 DIABETES MELLITUS WITH OTHER SPECIFIED COMPLICATION, WITH LONG-TERM CURRENT USE OF INSULIN (MULTI): Primary | ICD-10-CM

## 2024-07-15 DIAGNOSIS — E11.69 TYPE 2 DIABETES MELLITUS WITH OTHER SPECIFIED COMPLICATION, WITH LONG-TERM CURRENT USE OF INSULIN (MULTI): Primary | ICD-10-CM

## 2024-07-15 PROCEDURE — 99211 OFF/OP EST MAY X REQ PHY/QHP: CPT | Performed by: PHARMACIST

## 2024-07-15 RX ORDER — INSULIN PMP CART,AUT,G6/7,CNTR
1 EACH SUBCUTANEOUS
Qty: 15 EACH | Refills: 11 | Status: SHIPPED | OUTPATIENT
Start: 2024-07-15

## 2024-07-15 RX ORDER — INSULIN PMP CART,AUT,G6/7,CNTR
1 EACH SUBCUTANEOUS ONCE
Qty: 1 EACH | Refills: 0 | Status: SHIPPED | OUTPATIENT
Start: 2024-07-15 | End: 2024-07-15

## 2024-07-17 PROCEDURE — RXMED WILLOW AMBULATORY MEDICATION CHARGE

## 2024-07-17 RX ORDER — AMITRIPTYLINE HYDROCHLORIDE 100 MG/1
100 TABLET ORAL NIGHTLY
Qty: 30 TABLET | Refills: 11 | Status: SHIPPED | OUTPATIENT
Start: 2024-07-17 | End: 2025-07-17

## 2024-07-18 ENCOUNTER — PHARMACY VISIT (OUTPATIENT)
Dept: PHARMACY | Facility: CLINIC | Age: 49
End: 2024-07-18
Payer: MEDICAID

## 2024-07-18 ENCOUNTER — APPOINTMENT (OUTPATIENT)
Dept: NEUROLOGY | Facility: CLINIC | Age: 49
End: 2024-07-18
Payer: MEDICAID

## 2024-07-18 DIAGNOSIS — G43.711 CHRONIC MIGRAINE WITHOUT AURA, INTRACTABLE, WITH STATUS MIGRAINOSUS: Primary | ICD-10-CM

## 2024-07-18 PROCEDURE — 3049F LDL-C 100-129 MG/DL: CPT | Performed by: PSYCHIATRY & NEUROLOGY

## 2024-07-18 PROCEDURE — 99215 OFFICE O/P EST HI 40 MIN: CPT | Performed by: PSYCHIATRY & NEUROLOGY

## 2024-07-18 PROCEDURE — 3051F HG A1C>EQUAL 7.0%<8.0%: CPT | Performed by: PSYCHIATRY & NEUROLOGY

## 2024-07-18 RX ORDER — MUPIROCIN 20 MG/G
OINTMENT TOPICAL
COMMUNITY
Start: 2024-07-11

## 2024-07-18 RX ORDER — CYCLOBENZAPRINE HCL 10 MG
TABLET ORAL
COMMUNITY
Start: 2024-07-17

## 2024-07-18 RX ORDER — TRAMADOL HYDROCHLORIDE 50 MG/1
TABLET ORAL
COMMUNITY
Start: 2024-07-17

## 2024-07-18 RX ORDER — FLUTICASONE PROPIONATE AND SALMETEROL XINAFOATE 230; 21 UG/1; UG/1
AEROSOL, METERED RESPIRATORY (INHALATION)
COMMUNITY
Start: 2024-07-10

## 2024-07-18 ASSESSMENT — ENCOUNTER SYMPTOMS
DEPRESSION: 0
LOSS OF SENSATION IN FEET: 0
OCCASIONAL FEELINGS OF UNSTEADINESS: 1

## 2024-07-18 NOTE — PROGRESS NOTES
"Migraines have worsened in frequency and intensity in last 1-2 months.,   Experiencing nearly daily migraines. Was advised to stay off tylenol and ibuprofen in case some overuse cycle was going on.  Was trying to break overuse with Ubrelvy and experienced hives each time she took it so stopped.   Not using anything for treatment currently.   Daily migraine occurs. Pain ranges 4-10/10.   Goes into dark room and uses cold gel hat to try to ease pain.   Current migraine 4/10.   Reports 4 days in past week debilitating.   Continues daily Qulipta 60mg. And Aimovig monthly .Amitriptyline 100mg at hs from PCP  Propranolol ER once daily    Went to ER  for migraine and found an ear infection.   Migraine pain is holocephalic today. Usually just one side or the other. Describes as pressure  Associated light and noise sensitivity, nausea- phenergan has not been helpful recently. Has had  Zofran in the past and it stopped working so switched to phenergan.   Triggers are change in weather, certain smells,     Trouble falling asleep. Recent increase in amitriptyline. Averages 3-4 hours per night.     Feels mood is surprisingly managed pretty well. Not as bah as she would expect for lack of sleep she is experiencing.     Optic neuritis beginning of June   Meds used  Nurtec- worked for 1 year.   Triptans or DHE cannot use due to raynauds n ight terrors and d\"sleep seizures\". Has not had on elavil 100 mg.   Botox- worked for 3-4 years and stopped   Ajovy did not work  n ight terrors and d\"sleep seizures\". Has not had on elavil 100 mg.   Seizures had been in good control.  Focal seizures. In last month has had 8 lasting 1-2 minutes . She states due to a lot of stress.   Is seeing a counselor and reading a book DBT     Having   Holding Tizanidine currently to trial Flexeril for neck and back pain, Greater pain in low back  Tizaindine was not working well.     Assess/plan  Will use amitriptyline as a prevention. Dr Mensah documents " that the Aimovig and Qulipta are not working so we will stop those. Use nurtec to treat the headache now that the ubrelvy stopped working. Will send that in [facility] for rescue. Follow up 10-12 weeks

## 2024-07-18 NOTE — PATIENT INSTRUCTIONS
[unfilled]  Problem List Items Addressed This Visit    None  Visit Diagnoses       Chronic migraine without aura, intractable, with status migrainosus    -  Primary    Relevant Medications    lasmiditan 100 mg tablet

## 2024-07-19 ENCOUNTER — TELEPHONE (OUTPATIENT)
Dept: ENDOCRINOLOGY | Facility: CLINIC | Age: 49
End: 2024-07-19
Payer: MEDICAID

## 2024-07-19 DIAGNOSIS — G43.711 CHRONIC MIGRAINE WITHOUT AURA, INTRACTABLE, WITH STATUS MIGRAINOSUS: ICD-10-CM

## 2024-07-19 NOTE — TELEPHONE ENCOUNTER
Prior Auth for intro kit pending determination  Submitted on 7/19 via cmm    KEY: [id number] - intro kit  KEY: [id number]

## 2024-07-22 ENCOUNTER — PHARMACY VISIT (OUTPATIENT)
Dept: PHARMACY | Facility: CLINIC | Age: 49
End: 2024-07-22
Payer: MEDICAID

## 2024-07-22 ENCOUNTER — APPOINTMENT (OUTPATIENT)
Dept: RADIOLOGY | Facility: HOSPITAL | Age: 49
End: 2024-07-22
Payer: MEDICAID

## 2024-07-22 ENCOUNTER — HOSPITAL ENCOUNTER (EMERGENCY)
Facility: HOSPITAL | Age: 49
Discharge: HOME | End: 2024-07-22
Attending: EMERGENCY MEDICINE
Payer: MEDICAID

## 2024-07-22 VITALS
HEART RATE: 92 BPM | HEIGHT: 62 IN | WEIGHT: 250 LBS | DIASTOLIC BLOOD PRESSURE: 83 MMHG | TEMPERATURE: 98.2 F | OXYGEN SATURATION: 97 % | SYSTOLIC BLOOD PRESSURE: 124 MMHG | RESPIRATION RATE: 16 BRPM | BODY MASS INDEX: 46.01 KG/M2

## 2024-07-22 DIAGNOSIS — M17.11 OSTEOARTHRITIS OF RIGHT KNEE, UNSPECIFIED OSTEOARTHRITIS TYPE: Primary | ICD-10-CM

## 2024-07-22 PROCEDURE — 73564 X-RAY EXAM KNEE 4 OR MORE: CPT | Mod: RIGHT SIDE | Performed by: RADIOLOGY

## 2024-07-22 PROCEDURE — 73564 X-RAY EXAM KNEE 4 OR MORE: CPT | Mod: RT

## 2024-07-22 PROCEDURE — 99283 EMERGENCY DEPT VISIT LOW MDM: CPT

## 2024-07-22 PROCEDURE — RXMED WILLOW AMBULATORY MEDICATION CHARGE

## 2024-07-22 RX ORDER — DICLOFENAC SODIUM 10 MG/G
4 GEL TOPICAL 3 TIMES DAILY PRN
Qty: 50 G | Refills: 0 | Status: SHIPPED | OUTPATIENT
Start: 2024-07-22

## 2024-07-22 RX ORDER — METHYLPREDNISOLONE 4 MG/1
TABLET ORAL
Qty: 21 TABLET | Refills: 0 | Status: SHIPPED | OUTPATIENT
Start: 2024-07-22 | End: 2024-07-29

## 2024-07-22 ASSESSMENT — PAIN SCALES - GENERAL: PAINLEVEL_OUTOF10: 7

## 2024-07-22 ASSESSMENT — PAIN - FUNCTIONAL ASSESSMENT: PAIN_FUNCTIONAL_ASSESSMENT: 0-10

## 2024-07-22 NOTE — ED PROVIDER NOTES
"    Emergency Department Provider Note        MEDICAL DECISION MAKING:  Medical Decision Making       7/22/24     Chief Complaint   Patient presents with    Knee Pain     PT fell 1 month ago, Has had soreness in right knee, When she repositioned in bed she heard \"grinding and crunching\" in knee. Was nauseous and had increased pain. PT is unable to bear weight on right leg.       History/Exam limitations: none.   Additional history was obtained from patient.    HPI: Jonathan Ayala  is a 48 y.o. presents with right knee pain  Past medical records, Past medical history and surgical history reviewed and as documented.  History reviewed and as noted. past medical records reviewed.    Active Ambulatory Problems     Diagnosis Date Noted    CVID (common variable immunodeficiency) (Multi) 03/05/2019    Major depressive disorder, recurrent, in full remission with anxious distress (CMS-HCC) 08/11/2020    Benign essential hypertension 11/14/2017    Complicated migraine 04/19/2023    Diabetic gastroparesis associated with type 2 diabetes mellitus (Multi) 08/11/2020    S/P balloon dilatation of esophageal stricture 04/19/2023    Dyslipidemia, goal LDL below 100 02/26/2010    Insomnia 08/11/2020    Irritable bowel syndrome with diarrhea 08/11/2020    Hypothyroidism 08/10/2015    Lumbar radiculopathy 04/19/2023    Thyroid nodule 08/11/2020    Lung nodule 12/06/2018    Moderate persistent allergic asthma ([facility]) 04/19/2023    Class 3 severe obesity due to excess calories with serious comorbidity and body mass index (BMI) of 40.0 to 44.9 in adult (Multi) 04/19/2023    NAION (non-arteritic anterior ischemic optic neuropathy), left eye 04/19/2023    BRENT on CPAP 11/14/2017    Raynaud's phenomenon without gangrene 10/22/2017    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 04/19/2023    Restless legs syndrome 08/11/2020    Sjogren's syndrome with keratoconjunctivitis sicca (Multi) 10/09/2018    Bilateral fibrocystic breast changes " 08/21/2018    Female stress incontinence 10/27/2015    Vaginal moniliasis 06/28/2023    Type 2 diabetes mellitus with diabetic autonomic neuropathy, with long-term current use of insulin (Multi) 07/04/2023    Subjective tinnitus of both ears 08/29/2023    Tension headache 08/29/2023    Achalasia of esophagus 01/04/2024    GERD with esophagitis 01/04/2024    Benign intracranial hypertension due to metabolic disease 06/18/2024    Avulsion fracture of medial malleolus of right tibia 06/18/2024    Analgesic rebound headache 06/18/2024    Polypharmacy 06/18/2024    Seizure disorder (Multi) 06/18/2024    Adrenal nodule (Multi) 06/25/2024     Resolved Ambulatory Problems     Diagnosis Date Noted    H/O degenerative disc disease 08/04/2010    Deviated nasal septum 04/19/2023    Connective tissue disorder (Multi) 04/19/2023    Constipation 08/11/2020    Diarrhea 08/11/2020    COVID-19 virus infection 04/19/2023    Acute upper respiratory infection 11/12/2020    Acute urinary tract infection 11/12/2020    Anemia 08/11/2020    Asthma ([facility]) 04/19/2023    Cervicalgia 08/12/2020    Migraine 08/11/2020    Depression 12/27/2006    Difficulty breathing 04/19/2023    Focal seizures (Multi) 04/19/2023    Fracture of nasal bones, sequela 04/19/2023    Gastroesophageal reflux disease 02/09/2017    Herniated nucleus pulposus of lumbosacral region 04/19/2023    Hypogammaglobulinemia (Multi) 02/10/2012    Immune deficiency disorder (Multi) 04/19/2023    Insulin pump status 04/19/2023    Intractable chronic migraine without aura and without status migrainosus 08/08/2017    Intractable migraine with aura with status migrainosus 04/19/2023    Left optic nerve atrophy 04/19/2023    Lower abdominal pain 10/15/2020    Lower back pain 04/19/2023    Cervical radiculopathy 04/19/2023    Degeneration of intervertebral disc of cervical region 04/19/2023    Degeneration of intervertebral disc of lumbar region 04/19/2023    Lumbar spondylosis  04/19/2023    Other spondylosis, cervical region 04/19/2023    Spinal stenosis of cervical region 04/19/2023    Mass of left breast 04/19/2023    Nasal congestion 04/19/2023    Nasal turbinate hypertrophy 04/19/2023    Nose abnormality 04/19/2023    Obesity, Class II, BMI 35-39.9 01/11/2019    Optic neuritis 11/06/2020    Other dysphagia 09/18/2018    Polycystic disease, ovaries 08/11/2020    Poor posture 04/19/2023    DJD (degenerative joint disease) 04/19/2023    Post-traumatic osteoarthritis of multiple joints 04/19/2023    Postural orthostatic tachycardia syndrome 08/11/2020    Primary fibromyalgia syndrome 08/11/2020    Pseudoangiomatous stromal hyperplasia of breast 04/19/2023    Retention of urine 02/16/2017    Sjogrens syndrome (Multi) 04/19/2023    Solitary cyst of right breast 08/21/2018    Spinal stenosis of lumbosacral region 04/19/2023    Spondylolisthesis of cervical region 04/19/2023    Stroke-like symptoms 08/27/2020    Subcutaneous nodule of abdominal wall 10/05/2020    Subarachnoid hemorrhage, traumatic (Multi) 04/19/2023    TIA (transient ischemic attack) 08/27/2020    Viral pneumonia 01/06/2021    Vision loss, bilateral 10/01/2018    Vitamin D deficiency 04/19/2023    Weakness of extremity 09/01/2020    Wrist sprain, right, subsequent encounter 12/28/2020    Abnormal auditory perception of both ears 12/06/2018    Adrenal insufficiency (Multi) 06/28/2023    Breast calcification, right 08/21/2018    Delayed gastric transit 01/11/2019    Episodic cluster headache, not intractable 09/11/2017    Knee contusion 06/28/2023    Iron deficiency anemia 11/14/2017    History of endometrial ablation 11/09/2017    Imbalance 11/11/2018    Left knee pain 06/28/2023    Knee effusion 06/28/2023    Left leg pain 06/28/2023    Limited scleroderma (Multi) 04/13/2018    Mouth ulcer 05/17/2017    Numbness and tingling of both legs 07/01/2018    Nausea and/or vomiting 06/28/2023    Paresthesia 06/28/2023    Sudden  idiopathic hearing loss of left ear 11/11/2018    Syncope 12/28/2018    Thrush of mouth and esophagus (Multi) 06/28/2023    Gastroparesis 08/11/2020    Vision abnormalities 08/29/2023    Functional vision problem 08/29/2023     Past Medical History:   Diagnosis Date    Abnormal findings on diagnostic imaging of other abdominal regions, including retroperitoneum 10/14/2020    Acquired deformity of nose 03/24/2022    Acute upper respiratory infection, unspecified 10/16/2019    Allergy status to unspecified drugs, medicaments and biological substances 05/22/2020    Allergy status to unspecified drugs, medicaments and biological substances 11/13/2020    Benign intracranial hypertension 01/27/2022    Bipolar disorder, unspecified (Multi)     Cellulitis of abdominal wall 09/28/2022    Chronic maxillary sinusitis 01/04/2022    Chronic sialoadenitis 03/16/2020    COVID-19 01/06/2022    Decreased white blood cell count, unspecified 11/04/2019    Disease of thyroid gland     Disturbances of salivary secretion 03/16/2020    Dry eye syndrome of bilateral lacrimal glands 10/07/2022    Encounter for preprocedural cardiovascular examination 02/01/2022    Food additives allergy status 06/11/2020    Fracture of nasal bones, initial encounter for closed fracture 03/03/2022    Granuloma of right orbit 10/07/2021    Hyperlipidemia     Hypertension     Hyperthyroidism     Hypoglycemia     Localized swelling, mass and lump, head 03/24/2022    Major depressive disorder, recurrent, in full remission (CMS-HCC) 10/07/2021    Mammary duct ectasia of left breast 08/24/2022    Nipple discharge 08/24/2022    Ocular pain, right eye 10/07/2022    Optic atrophy     Other abnormal and inconclusive findings on diagnostic imaging of breast 07/06/2020    Other anomalies of pupillary function 05/31/2019    Other chest pain 05/18/2020    Other conditions influencing health status 08/01/2022    Other conditions influencing health status 08/03/2021     Other specified disorders of eustachian tube, left ear 11/18/2019    Other specified disorders of nose and nasal sinuses 03/24/2022    Other specified disorders of nose and nasal sinuses 03/24/2022    Pelvic and perineal pain 07/06/2020    Personal history of other diseases of the circulatory system 04/07/2020    Personal history of other diseases of the circulatory system 04/07/2020    Personal history of other diseases of the circulatory system     Personal history of other diseases of the digestive system     Personal history of other diseases of the digestive system 03/02/2020    Personal history of other diseases of the musculoskeletal system and connective tissue 01/19/2022    Personal history of other diseases of the musculoskeletal system and connective tissue 03/02/2021    Personal history of other diseases of the musculoskeletal system and connective tissue 06/16/2020    Personal history of other diseases of the nervous system and sense organs 11/18/2019    Personal history of other diseases of the nervous system and sense organs 09/21/2022    Personal history of other diseases of the respiratory system 04/14/2021    Personal history of other endocrine, nutritional and metabolic disease 02/17/2021    Personal history of other mental and behavioral disorders 05/27/2021    Personal history of other specified conditions 09/07/2022    Personal history of other specified conditions 10/16/2019    Personal history of other specified conditions 09/28/2022    Personal history of other specified conditions 09/16/2021    Personal history of other specified conditions 02/01/2022    Personal history of other specified conditions 03/09/2022    Personal history of other specified conditions 02/12/2014    Personal history of other specified conditions 10/27/2021    Personal history of other specified conditions 10/16/2019    Personal history of other specified conditions 02/26/2021    Personal history of other specified  conditions 02/22/2021    Personal history of urinary calculi     Polycystic ovary syndrome     Postural orthostatic tachycardia syndrome (POTS)     Rash and other nonspecific skin eruption 03/15/2022    Repeated falls 06/23/2021    Right lower quadrant pain 10/14/2020    Sjogren syndrome, unspecified (Multi)     Sjogren's syndrome (Multi)     Slow transit constipation 07/09/2020    Traumatic subarachnoid hemorrhage with loss of consciousness of unspecified duration, subsequent encounter 03/15/2022    Traumatic subarachnoid hemorrhage without loss of consciousness, subsequent encounter     Type 2 diabetes mellitus (Multi)     Unspecified disorder of refraction 10/07/2022    Unspecified optic neuritis 11/06/2020    Unspecified optic neuritis 11/06/2020    Unspecified visual loss 09/25/2019    Venous insufficiency (chronic) (peripheral) 10/18/2021    Viral infection, unspecified 01/11/2022    Vitamin D deficiency, unspecified 09/28/2022        Past Surgical History:   Procedure Laterality Date    APPENDECTOMY  2017    HERNIA REPAIR Right 03/01/2024    with mesh    HYSTERECTOMY  2017    MR HEAD ANGIO WO IV CONTRAST  02/08/2021    MR HEAD ANGIO WO IV CONTRAST 2/8/2021 [facility]    MR NECK ANGIO WO IV CONTRAST  02/08/2021    MR NECK ANGIO WO IV CONTRAST 2/8/2021 [facility] CLINICAL LEGAC[facility]    OTHER SURGICAL HISTORY  08/22/2019    Carpal tunnel surgery    OTHER SURGICAL HISTORY  08/22/2019    Hysterectomy    OTHER SURGICAL HISTORY  08/22/2019    Venous access port placement    OTHER SURGICAL HISTORY  08/22/2019    Cholecystectomy    OTHER SURGICAL HISTORY  08/22/2019    Appendectomy    OTHER SURGICAL HISTORY  08/22/2019    Pyloroplasty    WISDOM TOOTH EXTRACTION  2004        Family History   Problem Relation Name Age of Onset    Other (Perforated bowel) Mother Radha     Hypertension Mother Radha     Macular degeneration Mother Radha     Hyperlipidemia Father Mac     Hypertension Father Mac     Heart attack  Father Mac     Other (S/P CABG) Father Mac     Diabetes Father Mac     Prostate cancer Father Mac     Coronary artery disease Father Mac     Heart failure Father Mac     Stroke Father Mac     Cancer Father Mac     Macular degeneration Father Mac     Retinal detachment Father Mac     Stroke Sister Jazmin     Breast cancer Sister Jazmin     Lupus Sister Jazmin     Ovarian cancer Sister Jazmin     Astigmatism Sister Jazmin     Hyperlipidemia Brother Sanchez     Hypertension Brother Sanchez     Astigmatism Brother Sanchez     Diabetes Father's Sister Linda     Blindness Father's Sister Linda     Thyroid cancer Father's Sister Bekah     Thyroid disease Father's Sister Jessica     Colon cancer Father's Brother ?     Blindness Other Multiple     Melanoma Other      Asthma Other      Other (hay fever) Other      Allergies Other      Cancer Maternal Grandmother Brenda     Cancer Father's Brother Kian         Social History     Tobacco Use    Smoking status: Never    Smokeless tobacco: Never   Vaping Use    Vaping status: Never Used   Substance Use Topics    Alcohol use: Not Currently     Comment: Socially in College    Drug use: Yes     Types: Benzodiazepines        Allergies   Allergen Reactions    Acetazolamide Hives, Rash, Shortness of breath and Swelling     oral hives, redness, ABD pain    Atorvastatin Unknown     Causes muscle pain    Cefdinir Itching, Rash, Shortness of breath and Anaphylaxis     Itchy throat    Throat closing / difficulty breathing.  Took Benadryl at home.    Itchy throat      Throat closing / difficulty breathing.  Took Benadryl at home.    Ceftriaxone Anaphylaxis, Rash, Shortness of breath and Swelling     SOB    SOB hives turned red during gallbladder    Doxepin Anaphylaxis, Hives, Swelling, Angioedema and Rash     Itchy sore throat problems with swallowing    facial swelling    Itchy sore throat problems with swallowing      facial swelling    Duloxetine Anaphylaxis, Hallucinations and Rash      "suicidal ideation    suicidal ideation     Other reaction(s): suicidal ideation    suicidal    Suicidal ideation    Levofloxacin Angioedema, Swelling and Rash     eyelid swelling    eyelid swelling. Patient tolerates Avelox    Levofloxacin In D5w Anaphylaxis     Moon face lips swelling just Levaquin tolerated D5W)    Nutritional Supplements Anaphylaxis     Grapes ( red dye    Ozempic [Semaglutide] Nausea/vomiting     Severe gastroparesis worsening     Prochlorperazine Anaphylaxis     HIGH Compazine involuntary muscle spasms low doses tolerated)    Red Dye Anaphylaxis    Becky Anaphylaxis and Swelling     Becky    SOB facial swelling    Rosemary Oil Anaphylaxis, Itching and Swelling     Becky  SOB facial swelling      SOB facial swelling    Strawberry Shortness of breath     White blisters in mouth      Other reaction(s): white blisters in mouth, shortness of breath    Sulfa (Sulfonamide Antibiotics) Anaphylaxis, Rash, Shortness of breath and Swelling     SOB itchy hives    Other Reaction(s): rash, SOB      SOB itchy hives    Topiramate Anaphylaxis, Swelling and Angioedema     eyelid swelling    Throat swelling    eyelid swelling  Throat swelling      Throat swelling      eyelid swelling    Tree Pollen-Black [place] Shortness of breath     Other Reaction(s): Other: See Comments      White blisters in mouth      blisters in mouth-carries an EPIPEN-\"I have gotten short of breath\"    Tree Pollen-Pecan Shortness of breath     pecans    [id number] Antimigraine Agents Anaphylaxis and Nausea Only     \"[id number]- anti migraine agents and DHE: can cause stroke per pt \"    None due to Raynaud's  ( Triptans cause a stroke)    [place] Shortness of breath    Aripiprazole Unknown, Fever and Other     fever and tremors    Fevers and Tremors    Tremor    Aspartame Diarrhea     Blood sugar [place], Diarrhea    Aspartame (Bulk) Diarrhea, Nausea Only and Nausea/vomiting     Other Reaction(s): nausea, diarrhea, abdominal " pain      Blood sugar [place], Diarrhea    Fenofibrate Other     Double vision    Gluten Itching, Other and Rash     Rashes constipation gastric discomfort    Hydrochlorothiazide Hives, Itching and Rash     hctz removed as free-text allergy and entered as [facility] allergy,1455 10/6/2007JO      itchy hives    Hydromorphone Unknown, GI intolerance and Nausea Only     Intolerance nausea instant    Iron Hives and Rash     ichy hive ( can tolerate ferrous gluconate)    Meclizine Angioedema, Other and Swelling     eyelid swelling    Swelling of the eyelids    Eyelids and face    Metformin Other     Other reaction(s): Dyspepsia, Dyspepsia    Propoxyphene Unknown and Hives     Darvocet itchy hives    Statins-Hmg-Coa Reductase Inhibitors Unknown     Double vision    Tetracyclines Hives, Itching and Rash     sulfa    Rash itching    Itchy  rash    Thiazides Hives, Itching and Rash     itchy hives    Venlafaxine Unknown and Fever     Fevers chills    Wheat Unknown     oral blisters    Adhesive Tape-Silicones Itching and Other    Barbiturates Unknown    Ciprofloxacin Hives    Dhe Unknown     Raynaud's disease    Diet Foods Diarrhea     Aspartame allergy only. Removes to many foods to interface.    Farxiga [Dapagliflozin] Other     Frequent yeast infections and UTI    Ferrous Sulfate Hives    Nsaids (Non-Steroidal Anti-Inflammatory Drug) Unknown and Nausea/vomiting    Other Unknown    Sulfonylureas Unknown    Tobramycin Unknown    Ubrelvy [Ubrogepant] Hives    Vancomycin Unknown and Hives     Niyah syndrome    Other reaction(s): Other    Red man syndrome if given fast, benadryl with.     Niyah syndrome  Other reaction(s): Other      Other reaction(s): Other      Red man syndrome if given fast, benadryl with.    Adhesive Rash    Azithromycin Hives, Itching and Rash    Betamethasone Nausea And Vomiting, Other, GI intolerance and Diarrhea     N/V/D    House Dust Rash    Metoclopramide Hcl Other    Milk Unknown, Itching and Rash      "ABD pain    Prednisone Rash    Propoxyphene-Acetaminophen Hives and Rash    Sulfacetamide Sodium Rash and Swelling    Zolpidem Other and Hallucinations     Sleep walk    Other Reaction(s): insomnia and agitation      Sleep walk        Unless otherwise stated in this report the patient's positive and negative responses for review of systems for constitutional, eyes, ENT, cardiovascular, respiratory, gastrointestinal, neurological, genitourinary, musculoskeletal, and integument systems and related systems to the presenting problem are either as stated in the HPI or were not pertinent or were negative for the symptoms and/or complaints related to the presenting medical problem.    PHYSICAL EXAM  Triage/nursing notes and vital signs reviewed as available and as noted    Vitals:    07/22/24 0414   BP: 124/83   Pulse: (!) 103   Resp: 18   Temp: 36.8 °C (98.2 °F)   SpO2: 97%   Weight: 113 kg (250 lb)   Height: 1.575 m (5' 2\")         Vital Signs reviewed and noted to be within normal limits. the patient is not hypoxic.  -General: Alert, no acute distress, patient resting comfortably  -Skin: warm, intact, no pallor noted  -Head: Normocephalic, atraumatic  -Eye: Normal conjunctiva  -Cardiac: Normal peripheral perfusion  -Respiratory: No acute distress  -Musculoskeletal: No deformity, full ROM.  Right knee: Diffuse soft tissue tenderness noted of the elbow.  Mild joint ecchymosis and swelling.  No deformity.  No crepitus.  Range of motion grossly intact to flexion and extension.  No bony tenderness including that of the olecranon, or distal radius/ulna.  No bony tenderness, or deformity of the wrist joint or shoulder joint.  Neurovascular is intact.  -Neurological: alert and oriented, normal sensory and motor observed.  -Psychiatric: Cooperative    ED COURSE  Current subjective and objective findings, differential diagnosis includes but not limited to knee strain, sprain, osteoarthritis      Work-up performed to evaluate for " differential diagnosis as clinically indicated   Orders Placed This Encounter   Procedures    XR knee right 4+ views          Labs and imaging reviewed by me  and note         Labs Reviewed - No data to display     XR knee right 4+ views   Final Result   1.  No radiographic evidence for acute fracture at the right knee.                  MACRO:   None.        Signed by: Lawanda Elias 7/22/2024 7:04 AM   Dictation workstation:   [id number]               Pt course which Intervention and treatment included :    Procedure  Procedures    Medications - No data to display     Diagnoses as of 07/22/24 0707   Osteoarthritis of right knee, unspecified osteoarthritis type          DISPOSITION: Patient is stable for discharge.  Based upon my history, physical exam, evaluation and judgement regarding the aforementioned differentials, at the time of this assessment, I do not see evidence to support the presence of any life, neurologic, or limb threatening pathology in my considered differentials. Alternate non life threatening pathology has been considered, and has been ruled out based upon the findings of my evaluation. While there may be remaining pathology considered within the differential, this is not life threatening, not limb threatening, and does not warrant further emergent evaluation or treatment at this time.  Shared decision making made with the patient as applicable.  It is my judgement that further treatment and evaluation can safely proceed in the outpatient setting. Shared decision making was utilized to arrive at all clinical decisions. At this time I do not see evidence of an emergency medical condition based upon my medical screening examination.  All imaging and laboratory results were discussed with the patient in detail including acute as well as incidental findings.  I discussed the differential, results and discharge plan with the patient and/or family/friend/caregiver if present. Education and  reassurance provided regards to presumed diagnosis. I emphasized the importance of follow-up with the physician I referred them to in the timeframe recommended. I explained reasons for the patient to return to the Emergency Department. Additional verbal discharge instructions were also given and discussed with the patient to supplement those generated by the EMR. We also discussed medications that were prescribed (if any) including common side effects and interactions as well as proper dosing and administration. The patient was advised to abstain from driving, operating heavy machinery or making significant decisions while taking medications such as opiates and muscle relaxers that may impair this. All questions were addressed. They understand return precautions and discharge instructions. The patient and/or family/friend/caregiver expressed understanding    1. Osteoarthritis of right knee, unspecified osteoarthritis type  diclofenac sodium (Voltaren Arthritis Pain) 1 % gel    methylPREDNISolone (Medrol Dospak) 4 mg tablets         -------------------------------------------------------------------    07/22/24 at 7:05 AM - Marty R LeJeune, DO   Internal & Emergency Medicine          Marty R Lejeune, DO  Resident  07/22/24 0708

## 2024-07-22 NOTE — ED TRIAGE NOTES
"PT fell 1 month ago, Has had soreness in right knee, When she repositioned in bed she heard \"grinding and crunching\" in knee. Was nauseous and had increased pain. PT is unable to bear weight on right leg. PT A&Ox4, GCS 15, Airway patent.   "
[id number]

## 2024-07-24 NOTE — PROGRESS NOTES
Clinical Pharmacy Team met with Jonathan Ayala regarding a consultation for diabetes management thanks to a referral from Dr. Marah Felix MD. Below is a summary of our conversation and recommendations:    ________________________________________________________________________      Allergies   Allergen Reactions    Acetazolamide Hives, Rash, Shortness of breath and Swelling     oral hives, redness, ABD pain    Atorvastatin Unknown     Causes muscle pain    Cefdinir Itching, Rash, Shortness of breath and Anaphylaxis     Itchy throat    Throat closing / difficulty breathing.  Took Benadryl at home.    Itchy throat      Throat closing / difficulty breathing.  Took Benadryl at home.    Ceftriaxone Anaphylaxis, Rash, Shortness of breath and Swelling     SOB    SOB hives turned red during gallbladder    Doxepin Anaphylaxis, Hives, Swelling, Angioedema and Rash     Itchy sore throat problems with swallowing    facial swelling    Itchy sore throat problems with swallowing      facial swelling    Duloxetine Anaphylaxis, Hallucinations and Rash     suicidal ideation    suicidal ideation     Other reaction(s): suicidal ideation    suicidal    Suicidal ideation    Levofloxacin Angioedema, Swelling and Rash     eyelid swelling    eyelid swelling. Patient tolerates Avelox    Levofloxacin In D5w Anaphylaxis     Moon face lips swelling just Levaquin tolerated D5W)    Nutritional Supplements Anaphylaxis     Grapes ( red dye    Prochlorperazine Anaphylaxis     HIGH Compazine involuntary muscle spasms low doses tolerated)    Red Dye Anaphylaxis    Becky Anaphylaxis and Swelling     Becky    SOB facial swelling    Rosemary Oil Anaphylaxis, Itching and Swelling     Becky  SOB facial swelling      SOB facial swelling    Strawberry Shortness of breath     White blisters in mouth      Other reaction(s): white blisters in mouth, shortness of breath    Strawberry Flavor Anaphylaxis     Allergy to strawberry fruit and juice     "Sulfa (Sulfonamide Antibiotics) Anaphylaxis, Rash, Shortness of breath and Swelling     SOB itchy hives    Other Reaction(s): rash, SOB      SOB itchy hives    Topiramate Anaphylaxis, Swelling and Angioedema     eyelid swelling    Throat swelling    eyelid swelling  Throat swelling      Throat swelling      eyelid swelling    Tree Nuts Shortness of breath     walnuts    Tree Pollen-Black [place] Shortness of breath     Other Reaction(s): Other: See Comments      White blisters in mouth      blisters in mouth-carries an EPIPEN-\"I have gotten short of breath\"    Tree Pollen-Pecan Shortness of breath     pecans    Kyaiitas-[id number] Antimigraine Agents Anaphylaxis and Nausea Only     \"[id number]- anti migraine agents and DHE: can cause stroke per pt \"    None due to Raynaud's  ( Triptans cause a stroke)    [place] Shortness of breath    Aripiprazole Unknown, Fever and Other     fever and tremors    Fevers and Tremors    Tremor    Aspartame Diarrhea     Blood sugar [place], Diarrhea    Aspartame (Bulk) Diarrhea, Nausea Only and Nausea/vomiting     Other Reaction(s): nausea, diarrhea, abdominal pain      Blood sugar [place], Diarrhea    Fenofibrate Other     Double vision    Gluten Itching, Other and Rash     Rashes constipation gastric discomfort    Hydrochlorothiazide Hives, Itching and Rash     hctz removed as free-text allergy and entered as [facility] allergy,1455 10/6/2007JO      itchy hives    Hydromorphone Unknown, GI intolerance and Nausea Only     Intolerance nausea instant    Iron Hives and Rash     ichy hive ( can tolerate ferrous gluconate)    Meclizine Angioedema, Other and Swelling     eyelid swelling    Swelling of the eyelids    Eyelids and face    Metformin Other     Other reaction(s): Dyspepsia, Dyspepsia    Propoxyphene Unknown and Hives     Darvocet itchy hives    Statins-Hmg-Coa Reductase Inhibitors Unknown     Double vision    Tetracyclines Hives, Itching and Rash     sulfa    Rash itching    Itchy  rash    " Thiazides Hives, Itching and Rash     itchy hives    Venlafaxine Unknown and Fever     Fevers chills    Wheat Unknown     oral blisters    Adhesive Tape-Silicones Itching and Other    Barbiturates Unknown    Dhe Unknown     Raynaud's disease    Diet Foods Diarrhea     Aspartame allergy only. Removes to many foods to interface.    Ferrous Sulfate Hives    Nsaids (Non-Steroidal Anti-Inflammatory Drug) Unknown and Nausea/vomiting    Other Unknown    Sulfonylureas Unknown    Tobramycin Unknown    Vancomycin Unknown and Hives     Niyah syndrome    Other reaction(s): Other    Red man syndrome if given fast, benadryl with.     Niyah syndrome  Other reaction(s): Other      Other reaction(s): Other      Red man syndrome if given fast, benadryl with.    Adhesive Rash    Azithromycin Hives, Itching and Rash    Betamethasone Nausea And Vomiting, Other, GI intolerance and Diarrhea     N/V/D    House Dust Rash    Metoclopramide Hcl Other    Milk Unknown, Itching and Rash     ABD pain    Prednisone Rash    Propoxyphene-Acetaminophen Hives and Rash    Sulfacetamide Sodium Rash and Swelling    Zolpidem Other and Hallucinations     Sleep walk    Other Reaction(s): insomnia and agitation      Sleep walk         Diabetes Pharmacotherapy:   Toujeo 44 units subcutaneous once daily  Humalog with an ICR of 1:3 and ISF of 1:30  Farxiga 10 mg once daily    Patient reports tolerating this regimen well.      Lab Review  Lab Results   Component Value Date    BILITOT 0.3 08/06/2023    CALCIUM 9.5 08/06/2023    CO2 23 08/06/2023     08/06/2023    CREATININE 0.82 08/06/2023    GLUCOSE 135 (H) 08/06/2023    ALKPHOS 81 08/06/2023    K 4.2 08/06/2023    PROT 8.8 (H) 08/06/2023     08/06/2023    AST 21 08/06/2023    ALT 27 08/06/2023    BUN 17 08/06/2023    ANIONGAP 16 08/06/2023    MG 2.16 07/28/2023    PHOS 3.8 04/30/2023    LDH 95 07/17/2020    ALBUMIN 4.3 08/06/2023    LIPASE 20 04/30/2023    GFRF 89 08/06/2023    GFRMALE CANCELED  2023     Lab Results   Component Value Date    TRIG CANCELED 2023    CHOL CANCELED 2023    HDL CANCELED 2023     Lab Results   Component Value Date    HGBA1C 5.9 (H) 2023    HGBA1C 7.8 (A) 2023    HGBA1C 8.3 (A) 2022     The 10-year ASCVD risk score (Mamie SANDERSON, et al., 2019) is: 3.4%    Values used to calculate the score:      Age: 48 years      Sex: Female      Is Non- : No      Diabetic: Yes      Tobacco smoker: No      Systolic Blood Pressure: 146 mmHg      Is BP treated: Yes      HDL Cholesterol: 46.3 mg/dL      Total Cholesterol: 186 mg/dL          Assessment/Plan     The patient reports today for a diabetes consultation. Reviewed CGM report and discussed it with the patient. Plan; continue all meds  Will help switch to omnipod  Will help set up dietican refferal  Needs dexcom g6  Follow up 6 weeks  A minimum of 72 hours of CGM data was reviewed and used to make therapy decisions.       PATIENT EDUCATION/GOALS    Goals  Fasting B - 130 mg/dL  Postprandial BG: less than 180 mg/dL  A1c: less than 7%    Type of encounter: [virtual]    Provided counseling on lifestyle modifications, medications, and self-monitoring. Patient has no additional questions at this time. Pharmacy to follow up in 6 weeks. Please reach out with any questions. Thank you.       Joanna Evans, PharmD    Provider on site: Jazz Mccall CNP  Continue all meds under the continuation of care with the referring provider and clinical pharmacy

## 2024-07-25 ENCOUNTER — OFFICE VISIT (OUTPATIENT)
Dept: OPHTHALMOLOGY | Facility: CLINIC | Age: 49
End: 2024-07-25
Payer: MEDICAID

## 2024-07-25 DIAGNOSIS — H47.20 OPTIC ATROPHY: ICD-10-CM

## 2024-07-25 DIAGNOSIS — H57.09 RELATIVE AFFERENT PUPILLARY DEFECT OF LEFT EYE: Primary | ICD-10-CM

## 2024-07-25 DIAGNOSIS — H47.012 NAION (NON-ARTERITIC ANTERIOR ISCHEMIC OPTIC NEUROPATHY), LEFT EYE: ICD-10-CM

## 2024-07-25 DIAGNOSIS — G93.2 BENIGN INTRACRANIAL HYPERTENSION: ICD-10-CM

## 2024-07-25 PROCEDURE — 92133 CPTRZD OPH DX IMG PST SGM ON: CPT | Performed by: PSYCHIATRY & NEUROLOGY

## 2024-07-25 PROCEDURE — 99214 OFFICE O/P EST MOD 30 MIN: CPT | Performed by: PSYCHIATRY & NEUROLOGY

## 2024-07-25 PROCEDURE — 92083 EXTENDED VISUAL FIELD XM: CPT | Performed by: PSYCHIATRY & NEUROLOGY

## 2024-07-25 ASSESSMENT — CUP TO DISC RATIO
OD_RATIO: 0.15
OS_RATIO: 0.15

## 2024-07-25 ASSESSMENT — CONF VISUAL FIELD
OD_INFERIOR_NASAL_RESTRICTION: 1
OS_SUPERIOR_NASAL_RESTRICTION: 1
OS_INFERIOR_NASAL_RESTRICTION: 1
OS_INFERIOR_TEMPORAL_RESTRICTION: 1
OS_SUPERIOR_TEMPORAL_RESTRICTION: 1
OD_SUPERIOR_NASAL_RESTRICTION: 1

## 2024-07-25 ASSESSMENT — VISUAL ACUITY
OS_PH_CC: 20/200-2
CORRECTION_TYPE: GLASSES
OD_CC: 20/25-2
OD_PH_CC: 20/20-1
METHOD: SNELLEN - LINEAR
OS_CC: 20/400

## 2024-07-25 ASSESSMENT — ENCOUNTER SYMPTOMS
PSYCHIATRIC NEGATIVE: 0
GASTROINTESTINAL NEGATIVE: 0
CONSTITUTIONAL NEGATIVE: 0
MUSCULOSKELETAL NEGATIVE: 0
CARDIOVASCULAR NEGATIVE: 0
ENDOCRINE NEGATIVE: 0
ALLERGIC/IMMUNOLOGIC NEGATIVE: 0
EYES NEGATIVE: 1
HEMATOLOGIC/LYMPHATIC NEGATIVE: 0
NEUROLOGICAL NEGATIVE: 0
RESPIRATORY NEGATIVE: 0

## 2024-07-25 ASSESSMENT — SLIT LAMP EXAM - LIDS
COMMENTS: NORMAL
COMMENTS: NORMAL

## 2024-07-25 ASSESSMENT — EXTERNAL EXAM - LEFT EYE: OS_EXAM: NORMAL

## 2024-07-25 ASSESSMENT — TONOMETRY
IOP_METHOD: GOLDMANN APPLANATION
OD_IOP_MMHG: 16
OS_IOP_MMHG: 16

## 2024-07-25 ASSESSMENT — EXTERNAL EXAM - RIGHT EYE: OD_EXAM: NORMAL

## 2024-07-25 NOTE — PROGRESS NOTES
"Assessment and Plan    06/08/2021 +OKN response OD  09/30/2019 +OKN response OD    07/09/2024 CT head without contrast, which I personally reviewed, shows stable right frontal encephalomalacia.    06/05/2024 MRI orbits with contrast, which I personally reviewed previously, shows right frontal encephalomalacia similar to prior imaging.  11/07/2023 MRI brain without contrast, by report from [facility], shows \"No acute intracranial abnormality including no evidence of an acute or recent brain parenchymal infarct. \"  11/07/2023 CTA head & neck & CT head without contrast, by report from [facility], show \"No acute abnormality of the cervical and intracranial vasculature.\" And \"No evidence of an acute intracranial process.     Focal RIGHT frontal lobe encephalomalacia deep to a sherley hole, new from   02/25/2020. \"  08/01/2023 MRV head, which I personally reviewed previously, shows no lesion.  07/29/2023 MRI brain & orbits with contrast, which I personally reviewed previously, shows right frontal encephalomalacia consistent with prior bolt.  Interval head imaging.  10/05/2022 CT head without contrast & CTA head & neck, by report from [facility], show \" 1.   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  Overlying right-sided sherley hole.)  2.  Prominence of the ventricles and sulci for age.  \" and \"1. Similar appearing old areas of infarction involving the right and left frontal lobes extending to the convexities with underlying encephalomalacia at site of prior hemorrhage from 02/13/2022. Overlying right-sided sherley hole. Prominence of the ventricles and sulci for age. No evidence of acute infarction. No active hemorrhage. 2. No significant stenosis internal carotid arteries. 3. No significant stenosis of the intracranial vessels or evidence of aneurysm. 4. Aberrant " "right subclavian artery behind the esophagus with no dilation of the proximal esophagus.\"  09/25/2022 CT head without contrast, which I personally reviewed previously, shows no lesion.  04/20/2022 CT head without contrast, which I personally reviewed previously, shows right frontal encephalomalacia judged stable by Radiology.  Interval head imaging.  09/10/2021 MRI brain with contrast, which I personally reviewed previously, shows no lesion.  09/09/2021 CTA head & neck, which I personally reviewed previously, shows no lesion.  09/09/2021 CT head without contrast, which I personally reviewed previously, shows no lesion.  08/11/2021 MRI brain & orbits with contrast & MRV head, which I personally reviewed previously, show left optic atrophy with Radiology noting “Punctate focus of enhancement seen along the left 7th-8th cranial nerve bundle at the level of the porus acusticus, indeterminate. Otherwise unremarkable MRI of the brain.”  Interval head imaging.  06/04/2021 MRI brain & orbits with contrast, which I personally reviewed previously, shows left optic atrophy.  Interval head imaging.  06/29/2017 MRI brain without contrast, which I personally reviewed previously, shows no lesion.  11/22/2007 CT head without contrast, which I personally reviewed previously, shows no lesion.    08/31/2023 lumbar puncture tube 1: , WBC 0, tube 4: RBC 6 & WBC 0, protein 91 & glucose 71.  08/11/2021 lumbar puncture opening pressure 18 cm water, tube 1: RBC 0, WBC 16 (86% L, 13% M), tube 4: RBC 0 & WBC 16 (92% L, 8% M), protein 71 & glucose 61. Cytology negative. Flow cytometry negative. Oligoclonal bands 0. IgG studies with high CSF IgG & albumin.  08/05/2020 lumbar puncture opening pressure 20 cm water, protein 68, glucose 85. MBP wnl. Oligoclonal bands POSITIVE 4.  12/26/2019 lumbar puncture no opening pressure checked per patient) tube ?: RBC 39, WBC 6 (91% L, 9% M), tube ?: RBC 38, WBC 5, protein 89, glucose 79. (via [facility] " from [place])  11/13/2019 lumbar puncture opening pressure 22 cm water, tube 1: RBC 9, WBC 4, tube 4: RBC 1 & WBC 5, protein 82 & glucose 61. Oligoclonal bands 0. IgG studies with non-specific abnormalities. HSV PCR negative.  09/20/2019 lumbar puncture opening pressure 20 cm water, RBC 1, WBC 7 (96% L, 4% M), protein 94 & glucose 78.  01/31/2015 lumbar puncture RBC 2, WBC 0, protein 104 & glucose 74.    07/27/2019 multifocal ERG with mildly reduced central responses.  Pattern VEP with OD borderline latency at 0.25 degrees and OS with severely prolonged latencies and decreased amplitudes.    Lab Results   Component Value Date/Time    SEDRATE 19 08/01/2023 1558    SEDRATE 33 (H) 08/07/2022 0322    SEDRATE 19 05/25/2022 1501    SEDRATE 3 06/05/2020 1110    SEDRATE 6 05/13/2020 1529    SEDRATE 3 03/28/2020 0629    SEDRATE 5 09/16/2019 0507    SEDRATE 8 08/19/2019 1314    CRP 0.90 08/07/2022 0322    CRP 0.35 05/25/2022 1501    CRP 0.34 09/23/2021 0618    CRP 0.71 09/21/2021 0648    CRP 0.14 07/16/2020 0944    CRP 0.10 06/05/2020 1110    CRP <0.10 05/13/2020 1529    CRP <0.10 03/28/2020 0629    CRP 3.64 (A) 11/21/2019 2157    CRP <0.10 08/19/2019 1314      Lab Results   Component Value Date/Time    MNRLURCR63 299 02/23/2023 0941    AGPLRSBE66 709 02/09/2021 0451    ZRHENYJH29 508 12/10/2019 1349    RCFOL 951 12/10/2019 1349      Lab Results   Component Value Date/Time    NMOAQP4 Negative 11/14/2019 1447    MOGFACS Negative 11/10/2020 1000    MOGFACS Negative 11/14/2019 1447    ACE 26 11/10/2020 1000      Tuberculosis studies  Lab Results   Component Value Date/Time    TBSIN Negative 09/22/2021 0430    TBSIN Negative 11/10/2020 1000    TBSIN Negative 11/14/2019 0531      12/04/2023 ESR 10. CRP <0.3 mg/dL.  10/01/2023 syphilis non-reactive (NR).  09/18/2020 ESR 1. CRP < 0.08 mg/dL (0.8 mg/L).  08/28/2020 HbA1c HIGH 7.0. Lipid panel with LDL 64.  08/05/2020 B12 462. Folate wnl. AQP4 ab negative.  10/2019 Aure hereditary  optic neuropathy testing negative with Dr. Suarez.  07/09/2019 BRETT > 1:640. Anti-centromere HIGH 101 (0-40). scl-70 negative. Chromatin ab IgG, anti-ribosomal ab, anti-Sarahy ab, anti-DNA ab negative.    07/25/2024 OCT RNFL OD 89 & OS 37. (Decrease, now at baseline of early 2024)  06/26/2024 OCT RNFL OD 95 & OS 41. (Stable)  06/05/2024 OCT RNFL OD 95 & OS 40. (Stable)  OCT macula OD normal foveal contour 264 & OS normal foveal contour 234.  03/15/2024 OCT RNFL OD 92 & OS 36. (Stable)  01/09/2024 OCT RNFL OD 89 & OS 36. (Stable)  02/06/2023 OCT RNFL OD 92 & OS 38. (stable)  OCT macula OD normal foveal contour 255 & OS thinning RNFL miscentered wrt fovea.  10/07/2022 OCT RNFL OD 92 & OS 40. (decreased OD & stable OS)  09/23/2022 OCT RNFL OD 98 & OS 38. (increased OD & stable OS)  01/27/2022 OCT RNFL OD 88 & OS 37. (stable)  10/06/2021 OCT RNFL OD 93 & OS 39 (stable)..  08/19/2021 OCT RNFL OD 92 & OS 38. (stable)  06/08/2021 OCT RNFL OD 87 & OS 37. (stable)  01/28/2021 OCT RNFL OD 85 & OS 37. (stable)  11/06/2020 OCT RNFL OD 89 & OS 39. (stable)  07/27/2020 OCT RNFL OD 89 & OS 38. (stable)  01/07/2020 OCT RNFL OD 93 & OS 38. (stable to mild increase OD, stable to mild decrease OS)  11/27/2019 OCT RNFL OD 84 & OS 42. (stable to mild OD decrease)  09/30/2019 OCT RNFL OD 89 & OS 41. (stable)  07/18/2019 OCT RNFL OD 90 & OS 39.  OCT macula OD normal foveal contour 256 & OS thinning of RNFL & GCL, but preserved foveal contour 238.    07/25/2024 HVF 24-2 OD stimulus V, fovea 39, superior > inferior nasal step & OS stimulus V, fovea 18, generalized depression with some superior temporal sparing (inferior > superior altitudinal.  06/26/2024 HVF 24-2 OD fovea 33, FL 3/15, FP 0%, FN 20%, superior arcuate MD -13.69 & OS stimulus V, fovea 27, generalized depression.  06/05/2024 HVF 24-2 OD fovea 34, FL 2/16, FP 0%, FN 40%, superior altitudinal MD -18.00 & OS stimulus V, fovea 25, generalized depression.  03/15/2024 HVF 24-2 OD fovea  36, wnl MD -1.05 & OS stimulus V, fovea 3, inferior arcuate > superior arcuate.  01/09/2024 HVF 24-2 OD wnl MD -0.94 & OS generalized depression MD -32.10.  02/06/2023 HVF 24-2 OD fovea 36, FL 1/17, FP 0%< FN 15%, scatter MD -7.58 & OS stimulus V, fovea 19, generalized depression.  10/07/2022 HVF 24-2 OD fovea 35, wnl MD -1.26 & OS fovea 5, generalized depression MD -30.62.  09/23/2022 HVF 24-2 OD fovea 38, scatter MD -2.67 & OS stimulus V, fovea 16, superior arcuate & inferior arcuate.  01/27/2022 HVF 24-2 OD fovea 36, wnl MD -1.67 & OS stimulus V, fovea 28, generalized reduction.  10/06/2021 HVF 24-2 OD fovea 36, scatter MD -4.76 & OS stimulus V, generalized reduction.  08/19/2021 HVF 24-2 OD fovea 39, wnl MD -2.31 & OS stimulus V, fovea 28, generalized depression.  06/08/2021 HVF Stimulus V OD fovea 34, wnl & OS stimulus V, fovea 24, generalized depression.  01/28/2021 HVF 24-2 OD fovea 40, wnl MD -0.63 & OS stimulus V, fovea 28, superior nasal step & inferior arcuate.  11/06/2020 HVF 24-2 OD fovea 32, possible inferior > superior arcuate MD -14.71 & OS stimulus V, fovea 10, generalized depression.  07/27/2020 HVF 24-2 OD fovea 39, wnl MD -2.45 & OS stimulus V, fovea 27, superior & inferior altitudinal.    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    The superior altitudinal vision loss of the right eye shows some further improvement. OCT retinal nerve fiber layer shows minimal change. I still think this problem was functional vision loss with delayed recovery. We discussed again whether to pursue diagnostic electrophysiological testing. With the longer recovery, I  favor doing so although we could wait.    The left eye findings are the same and consistent with prior non-arteritic anterior ischemic optic neuropathy.    I again do not see recurrent optic disc edema to raise concerns for idiopathic intracranial hypertension exacerbation although there are some changes on OCT retinal nerve fiber layer that could reflect improvement of idiopathic intracranial hypertension or could be fluctiations in testing.    Plan    Check multifocal ERG, ERG and visual evoked potential (VEP), mainly for the right eye.  Continue counseling with Mr. Jose Francisco Thomas.  Continue with Dr. Bai for headache treatment.  Vasculopathic risk factor control.  Continue off acetazolamide.    Follow up in 1-2 months with HVF & OCT. (dilated 6/5/2024)

## 2024-07-26 RX ORDER — INSULIN LISPRO 100 [IU]/ML
INJECTION, SOLUTION INTRAVENOUS; SUBCUTANEOUS
Qty: 30 ML | Refills: 11 | Status: SHIPPED | OUTPATIENT
Start: 2024-07-26

## 2024-07-29 ENCOUNTER — APPOINTMENT (OUTPATIENT)
Dept: ENDOCRINOLOGY | Facility: CLINIC | Age: 49
End: 2024-07-29
Payer: MEDICAID

## 2024-07-29 DIAGNOSIS — E11.65 UNCONTROLLED TYPE 2 DIABETES MELLITUS WITH HYPERGLYCEMIA (MULTI): Primary | ICD-10-CM

## 2024-07-29 NOTE — PROGRESS NOTES
Clinical Pharmacy Team met with Jonathan Ayala regarding a consultation for diabetes management thanks to a referral from Dr. Marah Felix MD. Below is a summary of our conversation and recommendations:    ________________________________________________________________________      Allergies   Allergen Reactions    Acetazolamide Hives, Rash, Shortness of breath and Swelling     oral hives, redness, ABD pain    Atorvastatin Unknown     Causes muscle pain    Cefdinir Itching, Rash, Shortness of breath and Anaphylaxis     Itchy throat    Throat closing / difficulty breathing.  Took Benadryl at home.    Itchy throat      Throat closing / difficulty breathing.  Took Benadryl at home.    Ceftriaxone Anaphylaxis, Rash, Shortness of breath and Swelling     SOB    SOB hives turned red during gallbladder    Doxepin Anaphylaxis, Hives, Swelling, Angioedema and Rash     Itchy sore throat problems with swallowing    facial swelling    Itchy sore throat problems with swallowing      facial swelling    Duloxetine Anaphylaxis, Hallucinations and Rash     suicidal ideation    suicidal ideation     Other reaction(s): suicidal ideation    suicidal    Suicidal ideation    Levofloxacin Angioedema, Swelling and Rash     eyelid swelling    eyelid swelling. Patient tolerates Avelox    Levofloxacin In D5w Anaphylaxis     Moon face lips swelling just Levaquin tolerated D5W)    Nutritional Supplements Anaphylaxis     Grapes ( red dye    Prochlorperazine Anaphylaxis     HIGH Compazine involuntary muscle spasms low doses tolerated)    Red Dye Anaphylaxis    Becky Anaphylaxis and Swelling     Becky    SOB facial swelling    Rosemary Oil Anaphylaxis, Itching and Swelling     Becky  SOB facial swelling      SOB facial swelling    Strawberry Shortness of breath     White blisters in mouth      Other reaction(s): white blisters in mouth, shortness of breath    Strawberry Flavor Anaphylaxis     Allergy to strawberry fruit and juice     "Sulfa (Sulfonamide Antibiotics) Anaphylaxis, Rash, Shortness of breath and Swelling     SOB itchy hives    Other Reaction(s): rash, SOB      SOB itchy hives    Topiramate Anaphylaxis, Swelling and Angioedema     eyelid swelling    Throat swelling    eyelid swelling  Throat swelling      Throat swelling      eyelid swelling    Tree Nuts Shortness of breath     walnuts    Tree Pollen-Black Sixes Shortness of breath     Other Reaction(s): Other: See Comments      White blisters in mouth      blisters in mouth-carries an EPIPEN-\"I have gotten short of breath\"    Tree Pollen-Pecan Shortness of breath     pecans    [id number] Antimigraine Agents Anaphylaxis and Nausea Only     \"[id number]- anti migraine agents and DHE: can cause stroke per pt \"    None due to Raynaud's  ( Triptans cause a stroke)    Sixes Shortness of breath    Aripiprazole Unknown, Fever and Other     fever and tremors    Fevers and Tremors    Tremor    Aspartame Diarrhea     Blood sugar Everett, Diarrhea    Aspartame (Bulk) Diarrhea, Nausea Only and Nausea/vomiting     Other Reaction(s): nausea, diarrhea, abdominal pain      Blood sugar Everett, Diarrhea    Fenofibrate Other     Double vision    Gluten Itching, Other and Rash     Rashes constipation gastric discomfort    Hydrochlorothiazide Hives, Itching and Rash     hctz removed as free-text allergy and entered as [facility] allergy,1455 10/6/2007JO      itchy hives    Hydromorphone Unknown, GI intolerance and Nausea Only     Intolerance nausea instant    Iron Hives and Rash     ichy hive ( can tolerate ferrous gluconate)    Meclizine Angioedema, Other and Swelling     eyelid swelling    Swelling of the eyelids    Eyelids and face    Metformin Other     Other reaction(s): Dyspepsia, Dyspepsia    Propoxyphene Unknown and Hives     Darvocet itchy hives    Statins-Hmg-Coa Reductase Inhibitors Unknown     Double vision    Tetracyclines Hives, Itching and Rash     sulfa    Rash itching    Itchy  rash    " Thiazides Hives, Itching and Rash     itchy hives    Venlafaxine Unknown and Fever     Fevers chills    Wheat Unknown     oral blisters    Adhesive Tape-Silicones Itching and Other    Barbiturates Unknown    Dhe Unknown     Raynaud's disease    Diet Foods Diarrhea     Aspartame allergy only. Removes to many foods to interface.    Ferrous Sulfate Hives    Nsaids (Non-Steroidal Anti-Inflammatory Drug) Unknown and Nausea/vomiting    Other Unknown    Sulfonylureas Unknown    Tobramycin Unknown    Vancomycin Unknown and Hives     Niyah syndrome    Other reaction(s): Other    Red man syndrome if given fast, benadryl with.     Niyah syndrome  Other reaction(s): Other      Other reaction(s): Other      Red man syndrome if given fast, benadryl with.    Adhesive Rash    Azithromycin Hives, Itching and Rash    Betamethasone Nausea And Vomiting, Other, GI intolerance and Diarrhea     N/V/D    House Dust Rash    Metoclopramide Hcl Other    Milk Unknown, Itching and Rash     ABD pain    Prednisone Rash    Propoxyphene-Acetaminophen Hives and Rash    Sulfacetamide Sodium Rash and Swelling    Zolpidem Other and Hallucinations     Sleep walk    Other Reaction(s): insomnia and agitation      Sleep walk         Diabetes Pharmacotherapy:   Toujeo 44 units subcutaneous once daily  Humalog with an ICR of 1:3 and ISF of 1:30  Farxiga 10 mg once daily    Patient reports tolerating this regimen well.      Lab Review  Lab Results   Component Value Date    BILITOT 0.3 08/06/2023    CALCIUM 9.5 08/06/2023    CO2 23 08/06/2023     08/06/2023    CREATININE 0.82 08/06/2023    GLUCOSE 135 (H) 08/06/2023    ALKPHOS 81 08/06/2023    K 4.2 08/06/2023    PROT 8.8 (H) 08/06/2023     08/06/2023    AST 21 08/06/2023    ALT 27 08/06/2023    BUN 17 08/06/2023    ANIONGAP 16 08/06/2023    MG 2.16 07/28/2023    PHOS 3.8 04/30/2023    LDH 95 07/17/2020    ALBUMIN 4.3 08/06/2023    LIPASE 20 04/30/2023    GFRF 89 08/06/2023    GFRMALE CANCELED  2023     Lab Results   Component Value Date    TRIG CANCELED 2023    CHOL CANCELED 2023    HDL CANCELED 2023     Lab Results   Component Value Date    HGBA1C 5.9 (H) 2023    HGBA1C 7.8 (A) 2023    HGBA1C 8.3 (A) 2022     The 10-year ASCVD risk score (Mamie DK, et al., 2019) is: 3.4%    Values used to calculate the score:      Age: 48 years      Sex: Female      Is Non- : No      Diabetic: Yes      Tobacco smoker: No      Systolic Blood Pressure: 146 mmHg      Is BP treated: Yes      HDL Cholesterol: 46.3 mg/dL      Total Cholesterol: 186 mg/dL          Assessment/Plan     The patient reports today for a diabetes consultation. Reviewed CGM report and discussed it with the patient. She was able to fill her dexcom and omnipods. Answered patient questions. Will reach out to [employer] for help with training. Patient was agreeable to this plan.     PATIENT EDUCATION/GOALS    Goals  Fasting B - 130 mg/dL  Postprandial BG: less than 180 mg/dL  A1c: less than 7%    Type of encounter: [virtual]    Provided counseling on lifestyle modifications, medications, and self-monitoring. Patient has no additional questions at this time. Pharmacy to follow up in 6 weeks. Please reach out with any questions. Thank you.       Joanna Evans, PharmD    Provider on site: Jazz Mccall CNP  Continue all meds under the continuation of care with the referring provider and clinical pharmacy

## 2024-07-30 ENCOUNTER — TELEPHONE (OUTPATIENT)
Dept: ENDOCRINOLOGY | Facility: CLINIC | Age: 49
End: 2024-07-30
Payer: MEDICAID

## 2024-07-30 NOTE — TELEPHONE ENCOUNTER
Received a DWO from [employer] on 7/29 for Dr. Felix - sent to Jazz for signature  Completed and Faxed back to [phone] on 7/30      Notes and labs also requested. Faxed to [phone] on 7/30

## 2024-07-31 ENCOUNTER — APPOINTMENT (OUTPATIENT)
Dept: OPHTHALMOLOGY | Facility: CLINIC | Age: 49
End: 2024-07-31
Payer: MEDICAID

## 2024-08-01 ENCOUNTER — SPECIALTY PHARMACY (OUTPATIENT)
Dept: PHARMACY | Facility: CLINIC | Age: 49
End: 2024-08-01

## 2024-08-04 ENCOUNTER — APPOINTMENT (OUTPATIENT)
Dept: OPHTHALMOLOGY | Facility: CLINIC | Age: 49
End: 2024-08-04
Payer: MEDICAID

## 2024-08-04 DIAGNOSIS — E11.9 TYPE 2 DIABETES MELLITUS WITHOUT COMPLICATION, UNSPECIFIED WHETHER LONG TERM INSULIN USE (MULTI): ICD-10-CM

## 2024-08-04 RX ORDER — DAPAGLIFLOZIN 5 MG/1
5 TABLET, FILM COATED ORAL DAILY
Qty: 90 TABLET | Refills: 3 | Status: CANCELLED | OUTPATIENT
Start: 2024-08-04 | End: 2025-08-04

## 2024-08-05 ENCOUNTER — APPOINTMENT (OUTPATIENT)
Dept: NEUROLOGY | Facility: CLINIC | Age: 49
End: 2024-08-05
Payer: MEDICAID

## 2024-08-06 ENCOUNTER — APPOINTMENT (OUTPATIENT)
Dept: RADIOLOGY | Facility: HOSPITAL | Age: 49
End: 2024-08-06
Payer: MEDICAID

## 2024-08-06 ENCOUNTER — HOSPITAL ENCOUNTER (EMERGENCY)
Facility: HOSPITAL | Age: 49
Discharge: HOME | End: 2024-08-06
Attending: STUDENT IN AN ORGANIZED HEALTH CARE EDUCATION/TRAINING PROGRAM
Payer: MEDICAID

## 2024-08-06 ENCOUNTER — TELEPHONE (OUTPATIENT)
Dept: NEUROLOGY | Facility: CLINIC | Age: 49
End: 2024-08-06

## 2024-08-06 ENCOUNTER — APPOINTMENT (OUTPATIENT)
Dept: NEUROLOGY | Facility: CLINIC | Age: 49
End: 2024-08-06
Payer: MEDICAID

## 2024-08-06 ENCOUNTER — APPOINTMENT (OUTPATIENT)
Dept: CARDIOLOGY | Facility: HOSPITAL | Age: 49
End: 2024-08-06
Payer: MEDICAID

## 2024-08-06 VITALS
RESPIRATION RATE: 20 BRPM | DIASTOLIC BLOOD PRESSURE: 86 MMHG | HEART RATE: 91 BPM | OXYGEN SATURATION: 99 % | HEIGHT: 62 IN | TEMPERATURE: 97.9 F | WEIGHT: 250 LBS | BODY MASS INDEX: 46.01 KG/M2 | SYSTOLIC BLOOD PRESSURE: 106 MMHG

## 2024-08-06 DIAGNOSIS — E11.9 TYPE 2 DIABETES MELLITUS WITHOUT COMPLICATION, UNSPECIFIED WHETHER LONG TERM INSULIN USE (MULTI): ICD-10-CM

## 2024-08-06 DIAGNOSIS — H65.01 RIGHT ACUTE SEROUS OTITIS MEDIA, RECURRENCE NOT SPECIFIED: Primary | ICD-10-CM

## 2024-08-06 DIAGNOSIS — G43.711 CHRONIC MIGRAINE WITHOUT AURA, INTRACTABLE, WITH STATUS MIGRAINOSUS: ICD-10-CM

## 2024-08-06 DIAGNOSIS — I49.8 SINUS ARRHYTHMIA: ICD-10-CM

## 2024-08-06 LAB
ALBUMIN SERPL BCP-MCNC: 3.7 G/DL (ref 3.4–5)
ALP SERPL-CCNC: 74 U/L (ref 33–110)
ALT SERPL W P-5'-P-CCNC: 23 U/L (ref 7–45)
ANION GAP SERPL CALC-SCNC: 10 MMOL/L (ref 10–20)
APPEARANCE UR: CLEAR
AST SERPL W P-5'-P-CCNC: 17 U/L (ref 9–39)
BASOPHILS # BLD AUTO: 0.03 X10*3/UL (ref 0–0.1)
BASOPHILS NFR BLD AUTO: 0.5 %
BILIRUB DIRECT SERPL-MCNC: 0 MG/DL (ref 0–0.3)
BILIRUB SERPL-MCNC: 0.3 MG/DL (ref 0–1.2)
BILIRUB UR STRIP.AUTO-MCNC: NEGATIVE MG/DL
BUN SERPL-MCNC: 11 MG/DL (ref 6–23)
CALCIUM SERPL-MCNC: 8.8 MG/DL (ref 8.6–10.3)
CARDIAC TROPONIN I PNL SERPL HS: <3 NG/L (ref 0–13)
CHLORIDE SERPL-SCNC: 102 MMOL/L (ref 98–107)
CO2 SERPL-SCNC: 29 MMOL/L (ref 21–32)
COLOR UR: COLORLESS
CREAT SERPL-MCNC: 0.88 MG/DL (ref 0.5–1.05)
EGFRCR SERPLBLD CKD-EPI 2021: 82 ML/MIN/1.73M*2
EOSINOPHIL # BLD AUTO: 0.13 X10*3/UL (ref 0–0.7)
EOSINOPHIL NFR BLD AUTO: 2.1 %
ERYTHROCYTE [DISTWIDTH] IN BLOOD BY AUTOMATED COUNT: 15.3 % (ref 11.5–14.5)
GLUCOSE SERPL-MCNC: 136 MG/DL (ref 74–99)
GLUCOSE UR STRIP.AUTO-MCNC: NORMAL MG/DL
HCT VFR BLD AUTO: 35.2 % (ref 36–46)
HGB BLD-MCNC: 11.3 G/DL (ref 12–16)
IMM GRANULOCYTES # BLD AUTO: 0.04 X10*3/UL (ref 0–0.7)
IMM GRANULOCYTES NFR BLD AUTO: 0.6 % (ref 0–0.9)
KETONES UR STRIP.AUTO-MCNC: NEGATIVE MG/DL
LEUKOCYTE ESTERASE UR QL STRIP.AUTO: NEGATIVE
LYMPHOCYTES # BLD AUTO: 1.52 X10*3/UL (ref 1.2–4.8)
LYMPHOCYTES NFR BLD AUTO: 24 %
MCH RBC QN AUTO: 29.3 PG (ref 26–34)
MCHC RBC AUTO-ENTMCNC: 32.1 G/DL (ref 32–36)
MCV RBC AUTO: 91 FL (ref 80–100)
MONOCYTES # BLD AUTO: 0.35 X10*3/UL (ref 0.1–1)
MONOCYTES NFR BLD AUTO: 5.5 %
NEUTROPHILS # BLD AUTO: 4.26 X10*3/UL (ref 1.2–7.7)
NEUTROPHILS NFR BLD AUTO: 67.3 %
NITRITE UR QL STRIP.AUTO: NEGATIVE
NRBC BLD-RTO: 0 /100 WBCS (ref 0–0)
PH UR STRIP.AUTO: 5.5 [PH]
PLATELET # BLD AUTO: 265 X10*3/UL (ref 150–450)
POTASSIUM SERPL-SCNC: 4 MMOL/L (ref 3.5–5.3)
PROT SERPL-MCNC: 7.6 G/DL (ref 6.4–8.2)
PROT UR STRIP.AUTO-MCNC: NEGATIVE MG/DL
RBC # BLD AUTO: 3.86 X10*6/UL (ref 4–5.2)
RBC # UR STRIP.AUTO: NEGATIVE /UL
SODIUM SERPL-SCNC: 137 MMOL/L (ref 136–145)
SP GR UR STRIP.AUTO: 1.01
UROBILINOGEN UR STRIP.AUTO-MCNC: NORMAL MG/DL
WBC # BLD AUTO: 6.3 X10*3/UL (ref 4.4–11.3)

## 2024-08-06 PROCEDURE — 2550000001 HC RX 255 CONTRASTS: Performed by: STUDENT IN AN ORGANIZED HEALTH CARE EDUCATION/TRAINING PROGRAM

## 2024-08-06 PROCEDURE — 2500000004 HC RX 250 GENERAL PHARMACY W/ HCPCS (ALT 636 FOR OP/ED)

## 2024-08-06 PROCEDURE — 85025 COMPLETE CBC W/AUTO DIFF WBC: CPT | Performed by: NURSE PRACTITIONER

## 2024-08-06 PROCEDURE — 96375 TX/PRO/DX INJ NEW DRUG ADDON: CPT

## 2024-08-06 PROCEDURE — 96374 THER/PROPH/DIAG INJ IV PUSH: CPT

## 2024-08-06 PROCEDURE — 70496 CT ANGIOGRAPHY HEAD: CPT | Performed by: STUDENT IN AN ORGANIZED HEALTH CARE EDUCATION/TRAINING PROGRAM

## 2024-08-06 PROCEDURE — 80053 COMPREHEN METABOLIC PANEL: CPT | Performed by: NURSE PRACTITIONER

## 2024-08-06 PROCEDURE — 70498 CT ANGIOGRAPHY NECK: CPT | Performed by: STUDENT IN AN ORGANIZED HEALTH CARE EDUCATION/TRAINING PROGRAM

## 2024-08-06 PROCEDURE — 70498 CT ANGIOGRAPHY NECK: CPT

## 2024-08-06 PROCEDURE — 84484 ASSAY OF TROPONIN QUANT: CPT | Performed by: STUDENT IN AN ORGANIZED HEALTH CARE EDUCATION/TRAINING PROGRAM

## 2024-08-06 PROCEDURE — 2500000004 HC RX 250 GENERAL PHARMACY W/ HCPCS (ALT 636 FOR OP/ED): Performed by: NURSE PRACTITIONER

## 2024-08-06 PROCEDURE — 70450 CT HEAD/BRAIN W/O DYE: CPT

## 2024-08-06 PROCEDURE — 99285 EMERGENCY DEPT VISIT HI MDM: CPT | Mod: 25

## 2024-08-06 PROCEDURE — 81003 URINALYSIS AUTO W/O SCOPE: CPT | Performed by: NURSE PRACTITIONER

## 2024-08-06 PROCEDURE — 93005 ELECTROCARDIOGRAM TRACING: CPT

## 2024-08-06 RX ORDER — NARATRIPTAN 1 MG/1
1 TABLET ORAL ONCE AS NEEDED
Qty: 9 TABLET | Refills: 0 | Status: SHIPPED | OUTPATIENT
Start: 2024-08-06

## 2024-08-06 RX ORDER — KETOROLAC TROMETHAMINE 30 MG/ML
15 INJECTION, SOLUTION INTRAMUSCULAR; INTRAVENOUS ONCE
Status: COMPLETED | OUTPATIENT
Start: 2024-08-06 | End: 2024-08-06

## 2024-08-06 RX ORDER — KETOROLAC TROMETHAMINE 30 MG/ML
INJECTION, SOLUTION INTRAMUSCULAR; INTRAVENOUS
Status: COMPLETED
Start: 2024-08-06 | End: 2024-08-06

## 2024-08-06 RX ORDER — AMOXICILLIN AND CLAVULANATE POTASSIUM 875; 125 MG/1; MG/1
1 TABLET, FILM COATED ORAL EVERY 12 HOURS
Qty: 14 TABLET | Refills: 0 | Status: SHIPPED | OUTPATIENT
Start: 2024-08-06 | End: 2024-08-06 | Stop reason: SINTOL

## 2024-08-06 RX ORDER — AMOXICILLIN 400 MG/5ML
400 POWDER, FOR SUSPENSION ORAL 3 TIMES DAILY
Qty: 150 ML | Refills: 0 | Status: SHIPPED | OUTPATIENT
Start: 2024-08-06 | End: 2024-08-16

## 2024-08-06 RX ORDER — SUMATRIPTAN SUCCINATE 25 MG/1
25 TABLET ORAL ONCE AS NEEDED
Qty: 9 TABLET | Refills: 0 | Status: SHIPPED | OUTPATIENT
Start: 2024-08-06

## 2024-08-06 RX ORDER — ONDANSETRON HYDROCHLORIDE 2 MG/ML
4 INJECTION, SOLUTION INTRAVENOUS ONCE
Status: COMPLETED | OUTPATIENT
Start: 2024-08-06 | End: 2024-08-06

## 2024-08-06 RX ORDER — DAPAGLIFLOZIN 5 MG/1
5 TABLET, FILM COATED ORAL DAILY
Qty: 90 TABLET | Refills: 3 | Status: CANCELLED | OUTPATIENT
Start: 2024-08-04 | End: 2025-08-04

## 2024-08-06 ASSESSMENT — PAIN SCALES - GENERAL
PAINLEVEL_OUTOF10: 5 - MODERATE PAIN
PAINLEVEL_OUTOF10: 7
PAINLEVEL_OUTOF10: 5 - MODERATE PAIN
PAINLEVEL_OUTOF10: 0 - NO PAIN

## 2024-08-06 ASSESSMENT — PAIN DESCRIPTION - LOCATION
LOCATION: BACK

## 2024-08-06 ASSESSMENT — PAIN DESCRIPTION - PROGRESSION
CLINICAL_PROGRESSION: NOT CHANGED
CLINICAL_PROGRESSION: RESOLVED

## 2024-08-06 ASSESSMENT — PAIN DESCRIPTION - PAIN TYPE
TYPE: ACUTE PAIN
TYPE: ACUTE PAIN

## 2024-08-06 ASSESSMENT — LIFESTYLE VARIABLES
HAVE YOU EVER FELT YOU SHOULD CUT DOWN ON YOUR DRINKING: NO
HAVE PEOPLE ANNOYED YOU BY CRITICIZING YOUR DRINKING: NO
TOTAL SCORE: 0
EVER FELT BAD OR GUILTY ABOUT YOUR DRINKING: NO
EVER HAD A DRINK FIRST THING IN THE MORNING TO STEADY YOUR NERVES TO GET RID OF A HANGOVER: NO

## 2024-08-06 ASSESSMENT — PAIN DESCRIPTION - FREQUENCY: FREQUENCY: CONSTANT/CONTINUOUS

## 2024-08-06 ASSESSMENT — PAIN DESCRIPTION - ONSET: ONSET: ONGOING

## 2024-08-06 ASSESSMENT — PAIN - FUNCTIONAL ASSESSMENT
PAIN_FUNCTIONAL_ASSESSMENT: 0-10

## 2024-08-06 ASSESSMENT — PAIN DESCRIPTION - DESCRIPTORS: DESCRIPTORS: ACHING

## 2024-08-06 NOTE — PROGRESS NOTES
Patient given prescription for new wheelchair and hospital bed mattress patient having nausea and emesis orthostasis symptoms in the setting of diabetes mellitus chronic migraine headache and CVID.  Asked patient to hold amlodipine and furosemide and Zetia and repeat blood work given by endocrinologist at this time follow-up on results

## 2024-08-06 NOTE — ED PROVIDER NOTES
HPI   Chief Complaint   Patient presents with    Back Pain       48-year-old female with A past medical history of diabetes, CVID, depression, hypertension, complicated migraines, hyperlipidemia, hypothyroidism, obstructive sleep apnea, seizure disorder presents to the emergency department today for multiple complaints.  On initial evaluation patient states that she was having some lower back pain that stretches across her lumbar region.  No traumatic injury or falls.  No saddle anesthesia bowel or bladder incontinence.  Does not radiate down her legs. states she has also been having some dizziness upon standing and lower blood pressures with orthostatic hypotension.  States however she did not hear from her family doctor who wants her to stop taking the amlodipine and furosemide and then he will reevaluate her.  She also states that she has been having a continuous whooshing noise in her right ear for the past week and last Thursday she had difficulty finding her words with aphasia that resolved after a few minutes.  She was not evaluated after that time.  That has not returned.  She has no unilateral weakness.  She is at her baseline at this time.                          Anza Coma Scale Score: 15                  Patient History   Past Medical History:   Diagnosis Date    Abnormal findings on diagnostic imaging of other abdominal regions, including retroperitoneum 10/14/2020    Abnormal CT of the abdomen    Acquired deformity of nose 03/24/2022    Nasal deformity    Acute upper respiratory infection, unspecified 10/16/2019    Acute URI    Allergy status to unspecified drugs, medicaments and biological substances 05/22/2020    History of drug allergy    Allergy status to unspecified drugs, medicaments and biological substances 11/13/2020    History of adverse drug reaction    Benign intracranial hypertension 01/27/2022    Pseudotumor cerebri    Bipolar disorder, unspecified (Multi)     Bipolar disease, chronic     Breast calcification, right 08/21/2018    Cellulitis of abdominal wall 09/28/2022    Cellulitis of right abdominal wall    Cervicalgia 07/01/2020    Cervicalgia of [id number]-[id number] region    Chronic maxillary sinusitis 01/04/2022    Chronic maxillary sinusitis    Chronic sialoadenitis 03/16/2020    Chronic sialoadenitis    COVID-19 01/06/2022    COVID-19 with multiple comorbidities    Decreased white blood cell count, unspecified 11/04/2019    Leukopenia    Disease of thyroid gland     Disturbances of salivary secretion 03/16/2020    Xerostomia    Dry eye syndrome of bilateral lacrimal glands 10/07/2022    Dry eyes, bilateral    Encounter for preprocedural cardiovascular examination 02/01/2022    Preoperative cardiovascular examination    Food additives allergy status 06/11/2020    Allergy to food dye    Fracture of nasal bones, initial encounter for closed fracture 03/03/2022    Closed fracture of nasal bone, initial encounter    Granuloma of right orbit 10/07/2021    Inflammatory pseudotumor of right orbit    History of endometrial ablation 11/09/2017    Hyperlipidemia     Hypertension     Hyperthyroidism     Hypoglycemia     Hypothyroidism     Localized swelling, mass and lump, head 03/24/2022    Swollen nose    Major depressive disorder, recurrent, in full remission ([facility]) 10/07/2021    Depression, major, recurrent, in complete remission    Mammary duct ectasia of left breast 08/24/2022    Periductal mastitis of left breast    Migraine     Nipple discharge 08/24/2022    Bloody discharge from left nipple    Ocular pain, right eye 10/07/2022    Pain in right eye    Optic atrophy     Other abnormal and inconclusive findings on diagnostic imaging of breast 07/06/2020    Other abnormal and inconclusive findings on diagnostic imaging of breast    Other anomalies of pupillary function 05/31/2019    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    Other chest pain 05/18/2020    Chest discomfort    Other  conditions influencing health status 08/01/2022    History of cough    Other conditions influencing health status 08/03/2021    Chronic migraine    Other specified disorders of eustachian tube, left ear 11/18/2019    ETD (Eustachian tube dysfunction), left    Other specified disorders of nose and nasal sinuses 03/24/2022    Nasal dryness    Other specified disorders of nose and nasal sinuses 03/24/2022    Nasal crusting    Pelvic and perineal pain 07/06/2020    Pelvic pain    Personal history of other diseases of the circulatory system 04/07/2020    History of sinus tachycardia    Personal history of other diseases of the circulatory system 04/07/2020    History of abnormal electrocardiography    Personal history of other diseases of the circulatory system     History of Raynaud's syndrome    Personal history of other diseases of the digestive system     History of irritable bowel syndrome    Personal history of other diseases of the digestive system 03/02/2020    History of oral pain    Personal history of other diseases of the musculoskeletal system and connective tissue 01/19/2022    History of neck pain    Personal history of other diseases of the musculoskeletal system and connective tissue 03/02/2021    History of scleroderma    Personal history of other diseases of the musculoskeletal system and connective tissue 06/16/2020    History of muscle weakness    Personal history of other diseases of the nervous system and sense organs 11/18/2019    History of hearing loss    Personal history of other diseases of the nervous system and sense organs 09/21/2022    History of partial seizures    Personal history of other diseases of the respiratory system 04/14/2021    History of asthma    Personal history of other endocrine, nutritional and metabolic disease 02/17/2021    History of diabetes mellitus    Personal history of other mental and behavioral disorders 05/27/2021    History of anxiety    Personal history of  other specified conditions 09/07/2022    History of nipple discharge    Personal history of other specified conditions 10/16/2019    History of headache    Personal history of other specified conditions 09/28/2022    History of lump of left breast    Personal history of other specified conditions 09/16/2021    History of persistent cough    Personal history of other specified conditions 02/01/2022    History of palpitations    Personal history of other specified conditions 03/09/2022    History of headache    Personal history of other specified conditions 02/12/2014    History of chest pain    Personal history of other specified conditions 10/27/2021    History of nausea and vomiting    Personal history of other specified conditions 10/16/2019    History of fatigue    Personal history of other specified conditions 02/26/2021    History of orthopnea    Personal history of other specified conditions 02/22/2021    History of shortness of breath    Personal history of urinary calculi     H/O renal calculi    Polycystic ovary syndrome     Postural orthostatic tachycardia syndrome (POTS)     POTS (postural orthostatic tachycardia syndrome)    Rash and other nonspecific skin eruption 03/15/2022    Rash    Repeated falls 06/23/2021    Recurrent falls    Right lower quadrant pain 10/14/2020    Abdominal pain, RLQ (right lower quadrant)    Sjogren syndrome, unspecified (Multi)     History of Sjogren's disease    Sjogren's syndrome (Multi)     Slow transit constipation 07/09/2020    Slow transit constipation    Subarachnoid hemorrhage, traumatic (Multi) 04/19/2023    Thyroid nodule     Traumatic subarachnoid hemorrhage with loss of consciousness of unspecified duration, subsequent encounter 03/15/2022    Subarachnoid hemorrhage following injury, with loss of consciousness, subsequent encounter    Traumatic subarachnoid hemorrhage without loss of consciousness, subsequent encounter     Subarachnoid hemorrhage following injury,  no loss of consciousness, subsequent encounter    Type 2 diabetes mellitus (Multi)     Unspecified disorder of refraction 10/07/2022    Refractive error    Unspecified optic neuritis 11/06/2020    Right optic neuritis    Unspecified optic neuritis 11/06/2020    Optic neuritis, right    Unspecified visual loss 09/25/2019    Vision loss    Venous insufficiency (chronic) (peripheral) 10/18/2021    Chronic venous insufficiency of lower extremity    Viral infection, unspecified 01/11/2022    Nonspecific syndrome suggestive of viral illness    Vitamin D deficiency     Vitamin D deficiency, unspecified 09/28/2022    Vitamin D deficiency     Past Surgical History:   Procedure Laterality Date    APPENDECTOMY  2017    HERNIA REPAIR Right 03/01/2024    with mesh    HYSTERECTOMY  2017    MR HEAD ANGIO WO IV CONTRAST  02/08/2021    MR HEAD ANGIO WO IV CONTRAST 2/8/2021 [facility]    MR NECK ANGIO WO IV CONTRAST  02/08/2021    MR NECK ANGIO WO IV CONTRAST 2/8/2021 [facility] CLINICAL [facility]GAC[facility]    OTHER SURGICAL HISTORY  08/22/2019    Carpal tunnel surgery    OTHER SURGICAL HISTORY  08/22/2019    Hysterectomy    OTHER SURGICAL HISTORY  08/22/2019    Venous access port placement    OTHER SURGICAL HISTORY  08/22/2019    Cholecystectomy    OTHER SURGICAL HISTORY  08/22/2019    Appendectomy    OTHER SURGICAL HISTORY  08/22/2019    Pyloroplasty    WISDOM TOOTH EXTRACTION  2004     Family History   Problem Relation Name Age of Onset    Other (Perforated bowel) Mother Radha     Hypertension Mother Radha     Macular degeneration Mother Radha     Hyperlipidemia Father Mac     Hypertension Father Mac     Heart attack Father Mac     Other (S/P CABG) Father Mac     Diabetes Father Mac     Prostate cancer Father Mac     Coronary artery disease Father Mac     Heart failure Father Mac     Stroke Father Mac     Cancer Father Mac     Macular degeneration Father Mac     Retinal detachment Father Mac     Stroke Sister  Jazmin     Breast cancer Sister Jazmin     Lupus Sister Jazmin     Ovarian cancer Sister Jazmin     Astigmatism Sister Jazmin     Hyperlipidemia Brother Sanchez     Hypertension Brother Sanchez     Astigmatism Brother Sanchez     Diabetes Father's Sister Linda     Blindness Father's Sister Linda     Thyroid cancer Father's Sister Bekah     Thyroid disease Father's Sister Jessica     Colon cancer Father's Brother ?     Blindness Other Multiple     Melanoma Other      Asthma Other      Other (hay fever) Other      Allergies Other      Cancer Maternal Grandmother Brenda     Cancer Father's Brother Kian      Social History     Tobacco Use    Smoking status: Never    Smokeless tobacco: Never   Vaping Use    Vaping status: Never Used   Substance Use Topics    Alcohol use: Not Currently     Comment: Socially in College    Drug use: Yes     Types: Benzodiazepines       Physical Exam   ED Triage Vitals [08/06/24 1707]   Temperature Heart Rate Respirations BP   36.7 °C (98.1 °F) 91 18 129/80      Pulse Ox Temp Source Heart Rate Source Patient Position   98 % Temporal Monitor --      BP Location FiO2 (%)     -- --       Physical Exam  Vitals and nursing note reviewed.   Constitutional:       General: She is not in acute distress.     Appearance: Normal appearance. She is not toxic-appearing.   HENT:      Right Ear: Tympanic membrane normal.      Left Ear: Tympanic membrane normal.      Mouth/Throat:      Mouth: Mucous membranes are moist.      Pharynx: Oropharynx is clear.   Eyes:      Extraocular Movements: Extraocular movements intact.      Pupils: Pupils are equal, round, and reactive to light.   Cardiovascular:      Rate and Rhythm: Normal rate and regular rhythm.      Pulses: Normal pulses.      Heart sounds: Normal heart sounds.   Pulmonary:      Effort: Pulmonary effort is normal.      Breath sounds: Normal breath sounds.   Abdominal:      General: Abdomen is flat. Bowel sounds are normal.      Palpations: Abdomen is soft.      Tenderness:  There is no abdominal tenderness.   Musculoskeletal:         General: Normal range of motion.      Cervical back: Normal, normal range of motion and neck supple.      Thoracic back: Normal.      Lumbar back: Spasms and tenderness present. No bony tenderness. Negative right straight leg raise test and negative left straight leg raise test.      Comments: Bilateral paraspinal tenderness with no overlying skin changes   Skin:     General: Skin is warm and dry.      Capillary Refill: Capillary refill takes less than 2 seconds.   Neurological:      General: No focal deficit present.      Mental Status: She is alert and oriented to person, place, and time.      GCS: GCS eye subscore is 4. GCS verbal subscore is 5. GCS motor subscore is 6.      Cranial Nerves: Cranial nerves 2-12 are intact.      Sensory: Sensation is intact.      Motor: Motor function is intact.      Coordination: Coordination is intact.      Gait: Gait is intact.      Deep Tendon Reflexes: Reflexes are normal and symmetric.   Psychiatric:         Mood and Affect: Mood normal.         Behavior: Behavior normal.         Judgment: Judgment normal.         Labs Reviewed   CBC WITH AUTO DIFFERENTIAL   BASIC METABOLIC PANEL   HEPATIC FUNCTION PANEL   URINALYSIS WITH REFLEX CULTURE AND MICROSCOPIC    Narrative:     The following orders were created for panel order Urinalysis with Reflex Culture and Microscopic.  Procedure                               Abnormality         Status                     ---------                               -----------         ------                     Urinalysis with Reflex C...[[id number]]                                                 Extra Urine Gray Tube[[id number]]                                                         Please view results for these tests on the individual orders.   URINALYSIS WITH REFLEX CULTURE AND MICROSCOPIC   EXTRA URINE GRAY TUBE     Pain Management Panel  More data exists         Latest Ref Rng & Units  4/30/2023 2/15/2023   Pain Management Panel   Amphetamine Screen, Urine NEGATIVE PRESUMPTIVE NEGATIVE  PRESUMPTIVE NEGATIVE    Barbiturate Screen, Urine NEGATIVE PRESUMPTIVE NEGATIVE  PRESUMPTIVE NEGATIVE    Fentanyl Screen, Urine NEGATIVE PRESUMPTIVE NEGATIVE  PRESUMPTIVE NEGATIVE    Methadone Screen, Urine NEGATIVE PRESUMPTIVE NEGATIVE  PRESUMPTIVE NEGATIVE       Details                 CT head wo IV contrast    (Results Pending)       ED Course & [facility]   ED Course as of 08/06/24 2123   Tue Aug 06, 2024   2109 Repeat EKG showing sinus rhythm, no atrial flutter baseline visualized.  Patient remains asymptomatic from a cardiac perspective, continuing to deny chest pain, palpitations, shortness of breath.  Cardiology consulted. Given lack of consistency of flutter appearance on initial EKG, only appearing in leads I and II on initial exam and not in any other leads, and resolution on second EKG, this is thought to be likely artifact. Nonetheless will recommend cardiology outpatient evaluation and echocardiogram and close follow up with return precautions. [JG]      ED Course User Index  [JG] Maria Del Rosario Villalobos MD         Diagnoses as of 08/06/24 2123   Right acute serous otitis media, recurrence not specified   Sinus arrhythmia       Medical Decision Making  On initial evaluation patient is well-appearing the emergency department at this time.  We did move her into a regular bed due to her complaints.  Right ear does have some erythema no sign of infection.  Due to the moment of aphasia last week as well as this thumping sensation we did perform a neurologic exam with an NIH of 0.  She has an EVD CT score of 3.  Repeat EKG shows a rate of 92.  189 QRS 95 QTc 469 no ST elevation or deviation and no abnormality or signs of A-fib.  Labs were obtained and are within normal limits troponin less than 3 hepatic function within normal limits BMP shows slightly elevated glucose otherwise unremarkable urine shows no signs of  infection slight anemia but no leukocytosis.  CT without shows no evidence of stenosis of the cervical vessels no stenosis or large Chronos of the intracranial no evidence of acute cortical infarct or acute intracranial hemorrhage.  Patient is on single antiplatelet therapy as well as statin therapy.  Vital signs have remained stable in the ER with an NIH of 0.  Dual did follow the clinical practice guideline for TIA.  She was given some Toradol in the ER for her back pain with improvement.  I discussed use of anti-inflammatories at home as well as close outpatient follow-up and return precautions she verbalized understanding the plan has no further questions or concerns or patient will be discharged in stable condition.        Procedure  Procedures    I discussed the differential, results and discharge plan with the patient and/or family/friend/caregiver if present.  I emphasized the importance of follow-up with the physician I referred them to in the timeframe recommended.  I explained reasons for the patient to return to the Emergency Department. Additional verbal discharge instructions were also given and discussed with the patient to supplement those generated by the EMR. We also discussed medications that were prescribed (if any) including common side effects and interactions. The patient was advised to abstain from driving, operating heavy machinery or making significant decisions while taking medications such as opiates and muscle relaxers that may impair this. All questions were addressed.  They understand return precautions and discharge instructions. The patient and/or family/friend/caregiver expressed understanding.           Lori Arredondo, RAUL-ARMANDO  08/06/24 [id number]

## 2024-08-06 NOTE — ED TRIAGE NOTES
Pt to ED c/o back pain x 1 week, pt states pain gets worse when standing and causes dizziness. Pt states right ear feels like the ocean. Was on ABX for ear infection and finished course.

## 2024-08-06 NOTE — TELEPHONE ENCOUNTER
Insurance has denied both Reyvow and Nurtec for not trying step therapy, 2 triptans. Will script and pt instr on use. States understanding She will report back in 2 weeks with trial info if she chooses to try, but scripts will be sent and insurance will be satisfied

## 2024-08-06 NOTE — PATIENT INSTRUCTIONS

## 2024-08-07 ENCOUNTER — TELEPHONE (OUTPATIENT)
Dept: NEUROLOGY | Facility: CLINIC | Age: 49
End: 2024-08-07
Payer: MEDICAID

## 2024-08-07 LAB — HOLD SPECIMEN: NORMAL

## 2024-08-07 NOTE — DISCHARGE INSTRUCTIONS
You were seen today in the emergency department for right-sided ear infection.  You are given a prescription for Augmentin, please complete the full course of antibiotics even if your symptoms are resolving.  The remainder of your lab workup was unremarkable.  Your initial EKG (heart rhythm) showed slight arrhythmia, however repeat EKG was normal.  We do recommend outpatient follow-up with cardiology, and have provided referral.    Please take all medications as prescribed.    Please return to the emergency department any worsening symptoms, including lightheadedness, dizziness, chest pain, palpitations, loss of consciousness, or other concerns.

## 2024-08-07 NOTE — TELEPHONE ENCOUNTER
Spoke with Jonathan garcia Denial of both Nurtec and Reyvow and plan moving forwards. See other Telephone note 8-6-2024

## 2024-08-07 NOTE — ED PROVIDER NOTES
"HPI   Chief Complaint   Patient presents with    Back Pain       48-year female presenting with right-sided ear discomfort, has history of chronic recurrent otitis media.  She was recently given a prescription for amoxicillin course which she completed.  States her symptoms initially resolved.  Several days ago she began experiencing right-sided irritation and states she can \"hear the ocean\".  Patient has extensive allergy list and states she has previously only been able to tolerate amoxicillin suspension secondary to these allergies.  Patient initially endorsing some intermittent dizziness with sensation of the room spinning around her, reported initially that the symptoms occurred when she stood quickly from a seated position.  Denies fevers, chills, head trauma, neck or back pain, syncope, chest pain, palpitations, shortness of breath, abdominal pain, numbness, tingling, weakness.              Patient History   Past Medical History:   Diagnosis Date    Abnormal findings on diagnostic imaging of other abdominal regions, including retroperitoneum 10/14/2020    Abnormal CT of the abdomen    Acquired deformity of nose 03/24/2022    Nasal deformity    Acute upper respiratory infection, unspecified 10/16/2019    Acute URI    Allergy status to unspecified drugs, medicaments and biological substances 05/22/2020    History of drug allergy    Allergy status to unspecified drugs, medicaments and biological substances 11/13/2020    History of adverse drug reaction    Benign intracranial hypertension 01/27/2022    Pseudotumor cerebri    Bipolar disorder, unspecified (Multi)     Bipolar disease, chronic    Breast calcification, right 08/21/2018    Cellulitis of abdominal wall 09/28/2022    Cellulitis of right abdominal wall    Cervicalgia 07/01/2020    Cervicalgia of [id number] region    Chronic maxillary sinusitis 01/04/2022    Chronic maxillary sinusitis    Chronic sialoadenitis 03/16/2020    Chronic " sialoadenitis    COVID-19 01/06/2022    COVID-19 with multiple comorbidities    Decreased white blood cell count, unspecified 11/04/2019    Leukopenia    Disease of thyroid gland     Disturbances of salivary secretion 03/16/2020    Xerostomia    Dry eye syndrome of bilateral lacrimal glands 10/07/2022    Dry eyes, bilateral    Encounter for preprocedural cardiovascular examination 02/01/2022    Preoperative cardiovascular examination    Food additives allergy status 06/11/2020    Allergy to food dye    Fracture of nasal bones, initial encounter for closed fracture 03/03/2022    Closed fracture of nasal bone, initial encounter    Granuloma of right orbit 10/07/2021    Inflammatory pseudotumor of right orbit    History of endometrial ablation 11/09/2017    Hyperlipidemia     Hypertension     Hyperthyroidism     Hypoglycemia     Hypothyroidism     Localized swelling, mass and lump, head 03/24/2022    Swollen nose    Major depressive disorder, recurrent, in full remission (CMS-HCC) 10/07/2021    Depression, major, recurrent, in complete remission    Mammary duct ectasia of left breast 08/24/2022    Periductal mastitis of left breast    Migraine     Nipple discharge 08/24/2022    Bloody discharge from left nipple    Ocular pain, right eye 10/07/2022    Pain in right eye    Optic atrophy     Other abnormal and inconclusive findings on diagnostic imaging of breast 07/06/2020    Other abnormal and inconclusive findings on diagnostic imaging of breast    Other anomalies of pupillary function 05/31/2019    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    Other chest pain 05/18/2020    Chest discomfort    Other conditions influencing health status 08/01/2022    History of cough    Other conditions influencing health status 08/03/2021    Chronic migraine    Other specified disorders of eustachian tube, left ear 11/18/2019    ETD (Eustachian tube dysfunction), left    Other specified disorders of nose and nasal sinuses  03/24/2022    Nasal dryness    Other specified disorders of nose and nasal sinuses 03/24/2022    Nasal crusting    Pelvic and perineal pain 07/06/2020    Pelvic pain    Personal history of other diseases of the circulatory system 04/07/2020    History of sinus tachycardia    Personal history of other diseases of the circulatory system 04/07/2020    History of abnormal electrocardiography    Personal history of other diseases of the circulatory system     History of Raynaud's syndrome    Personal history of other diseases of the digestive system     History of irritable bowel syndrome    Personal history of other diseases of the digestive system 03/02/2020    History of oral pain    Personal history of other diseases of the musculoskeletal system and connective tissue 01/19/2022    History of neck pain    Personal history of other diseases of the musculoskeletal system and connective tissue 03/02/2021    History of scleroderma    Personal history of other diseases of the musculoskeletal system and connective tissue 06/16/2020    History of muscle weakness    Personal history of other diseases of the nervous system and sense organs 11/18/2019    History of hearing loss    Personal history of other diseases of the nervous system and sense organs 09/21/2022    History of partial seizures    Personal history of other diseases of the respiratory system 04/14/2021    History of asthma    Personal history of other endocrine, nutritional and metabolic disease 02/17/2021    History of diabetes mellitus    Personal history of other mental and behavioral disorders 05/27/2021    History of anxiety    Personal history of other specified conditions 09/07/2022    History of nipple discharge    Personal history of other specified conditions 10/16/2019    History of headache    Personal history of other specified conditions 09/28/2022    History of lump of left breast    Personal history of other specified conditions 09/16/2021     History of persistent cough    Personal history of other specified conditions 02/01/2022    History of palpitations    Personal history of other specified conditions 03/09/2022    History of headache    Personal history of other specified conditions 02/12/2014    History of chest pain    Personal history of other specified conditions 10/27/2021    History of nausea and vomiting    Personal history of other specified conditions 10/16/2019    History of fatigue    Personal history of other specified conditions 02/26/2021    History of orthopnea    Personal history of other specified conditions 02/22/2021    History of shortness of breath    Personal history of urinary calculi     H/O renal calculi    Polycystic ovary syndrome     Postural orthostatic tachycardia syndrome (POTS)     POTS (postural orthostatic tachycardia syndrome)    Rash and other nonspecific skin eruption 03/15/2022    Rash    Repeated falls 06/23/2021    Recurrent falls    Right lower quadrant pain 10/14/2020    Abdominal pain, RLQ (right lower quadrant)    Sjogren syndrome, unspecified (Multi)     History of Sjogren's disease    Sjogren's syndrome (Multi)     Slow transit constipation 07/09/2020    Slow transit constipation    Subarachnoid hemorrhage, traumatic (Multi) 04/19/2023    Thyroid nodule     Traumatic subarachnoid hemorrhage with loss of consciousness of unspecified duration, subsequent encounter 03/15/2022    Subarachnoid hemorrhage following injury, with loss of consciousness, subsequent encounter    Traumatic subarachnoid hemorrhage without loss of consciousness, subsequent encounter     Subarachnoid hemorrhage following injury, no loss of consciousness, subsequent encounter    Type 2 diabetes mellitus (Multi)     Unspecified disorder of refraction 10/07/2022    Refractive error    Unspecified optic neuritis 11/06/2020    Right optic neuritis    Unspecified optic neuritis 11/06/2020    Optic neuritis, right    Unspecified visual loss  09/25/2019    Vision loss    Venous insufficiency (chronic) (peripheral) 10/18/2021    Chronic venous insufficiency of lower extremity    Viral infection, unspecified 01/11/2022    Nonspecific syndrome suggestive of viral illness    Vitamin D deficiency     Vitamin D deficiency, unspecified 09/28/2022    Vitamin D deficiency     Past Surgical History:   Procedure Laterality Date    APPENDECTOMY  2017    HERNIA REPAIR Right 03/01/2024    with mesh    HYSTERECTOMY  2017    MR HEAD ANGIO WO IV CONTRAST  02/08/2021    MR HEAD ANGIO WO IV CONTRAST 2/8/2021 [facility]    MR NECK ANGIO WO IV CONTRAST  02/08/2021    MR NECK ANGIO WO IV CONTRAST 2/8/2021 [facility]    OTHER SURGICAL HISTORY  08/22/2019    Carpal tunnel surgery    OTHER SURGICAL HISTORY  08/22/2019    Hysterectomy    OTHER SURGICAL HISTORY  08/22/2019    Venous access port placement    OTHER SURGICAL HISTORY  08/22/2019    Cholecystectomy    OTHER SURGICAL HISTORY  08/22/2019    Appendectomy    OTHER SURGICAL HISTORY  08/22/2019    Pyloroplasty    WISDOM TOOTH EXTRACTION  2004     Family History   Problem Relation Name Age of Onset    Other (Perforated bowel) Mother Radha     Hypertension Mother Radha     Macular degeneration Mother Radha     Hyperlipidemia Father Mac     Hypertension Father Mac     Heart attack Father Mac     Other (S/P CABG) Father Mac     Diabetes Father Mac     Prostate cancer Father Mac     Coronary artery disease Father Mac     Heart failure Father Mac     Stroke Father Mac     Cancer Father Mac     Macular degeneration Father Mac     Retinal detachment Father Mac     Stroke Sister Jazmin     Breast cancer Sister Jazmin     Lupus Sister Jazmin     Ovarian cancer Sister Jazmin     Astigmatism Sister Jazmin     Hyperlipidemia Brother Sanchez     Hypertension Brother Sanchez     Astigmatism Brother Sanchez     Diabetes Father's Sister Linda     Blindness Father's Sister Linda     Thyroid cancer Father's Sister  Bekah     Thyroid disease Father's Sister Jessica     Colon cancer Father's Brother ?     Blindness Other Multiple     Melanoma Other      Asthma Other      Other (hay fever) Other      Allergies Other      Cancer Maternal Grandmother Brenda     Cancer Father's Brother Kian      Social History     Tobacco Use    Smoking status: Never    Smokeless tobacco: Never   Vaping Use    Vaping status: Never Used   Substance Use Topics    Alcohol use: Not Currently     Comment: Socially in College    Drug use: Yes     Types: Benzodiazepines       Physical Exam   ED Triage Vitals   Temperature Heart Rate Respirations BP   08/06/24 1707 08/06/24 1707 08/06/24 1707 08/06/24 1707   36.7 °C (98.1 °F) 91 18 129/80      Pulse Ox Temp Source Heart Rate Source Patient Position   08/06/24 1707 08/06/24 1707 08/06/24 1707 --   98 % Temporal Monitor       BP Location FiO2 (%)     08/06/24 1739 --     Left arm        Physical Exam  Constitutional:       Appearance: She is not toxic-appearing.   HENT:      Head: Normocephalic and atraumatic.      Ears:      Comments: Mild erythema and bulging to right tympanic membrane with visualized serous drainage. TM intact. No cerumen impaction. Left TM unremarkable.     Mouth/Throat:      Pharynx: Oropharynx is clear.   Eyes:      Extraocular Movements: Extraocular movements intact.      Pupils: Pupils are equal, round, and reactive to light.   Cardiovascular:      Rate and Rhythm: Normal rate and regular rhythm.   Pulmonary:      Effort: Pulmonary effort is normal.      Breath sounds: Normal breath sounds.   Abdominal:      Palpations: Abdomen is soft.      Tenderness: There is no abdominal tenderness.   Musculoskeletal:         General: Normal range of motion.      Cervical back: Normal range of motion.   Neurological:      General: No focal deficit present.      Mental Status: She is alert and oriented to person, place, and time.      Sensory: No sensory deficit.      Motor: No weakness.           ED  Course & MDM   ED Course as of 08/06/24 2131   Tue Aug 06, 2024   2109 Repeat EKG showing sinus rhythm, no atrial flutter baseline visualized.  Patient remains asymptomatic from a cardiac perspective, continuing to deny chest pain, palpitations, shortness of breath.  Cardiology consulted. Given lack of consistency of flutter appearance on initial EKG, only appearing in leads I and II on initial exam and not in any other leads, and resolution on second EKG, this is thought to be likely artifact. Nonetheless will recommend cardiology outpatient evaluation and echocardiogram and close follow up with return precautions. [JG]      ED Course User Index  [JG] Maria Del Rosario Villalobos MD         Diagnoses as of 08/06/24 2131   Right acute serous otitis media, recurrence not specified   Sinus arrhythmia                 No data recorded     [place] Coma Scale Score: 15 (08/06/24 1747 : Maria Del Rosario Rueda, RN)       NIH Stroke Scale: 0 (08/06/24 1747 : Maria Del Rosario Rueda, LESLIE)                   Medical Decision Making  No focal neurologic deficits on initial examination.  Patient was initially evaluated by FRANCHESCA, I was contacted for evaluation after concern for patient's dizziness symptoms.  CT head had been ordered prior to my involvement and patient was on the table, CTA added for completeness due to concern for dizziness, however low concern overall for intracranial origin given lack of neurologic deficit.  Patient does have mild erythema to the right tympanic membrane, small amount of serous drainage.  No cerumen impaction.  No sinus pain or tenderness to palpation.  Patient with known history of chronic otitis media, previously has been given amoxicillin suspension.  Will prescribe amoxicillin suspension given patient's extensive allergy list, however do recommend close outpatient follow-up with ENT for evaluation of recurrent infection with extensive allergies.  On initial EKG, lead I and lead II had flutter baseline, however no  flutter baseline noted amongst other leads, EKG inconsistent overall with atrial flutter.  Patient asymptomatic from cardiac standpoint.  No history of any cardiac arrhythmias.  Patient monitored in the ER for several hours without any onset of cardiac symptoms, and repeat EKG showed no evidence of flutter baseline.  Low suspicion for any underlying cardiac issue.  Troponin negative.  Will refer to cardiology out of an abundance of caution and discussed strict return precautions for any cardiac symptoms.  Discussed all findings with patient, recommending outpatient follow-up with PCP, ENT, and cardiology.  Discussed return precautions at length.  Patient voiced understanding and agreement with plan. Ambulating with a steady gait without assistance from the emergency department.         Maria Del Rosario Villalobos MD  08/06/24 0172

## 2024-08-08 DIAGNOSIS — D83.9 CVID (COMMON VARIABLE IMMUNODEFICIENCY) (MULTI): ICD-10-CM

## 2024-08-08 DIAGNOSIS — E88.9: ICD-10-CM

## 2024-08-08 DIAGNOSIS — G93.2: ICD-10-CM

## 2024-08-08 DIAGNOSIS — H47.012 NAION (NON-ARTERITIC ANTERIOR ISCHEMIC OPTIC NEUROPATHY), LEFT EYE: ICD-10-CM

## 2024-08-08 DIAGNOSIS — M35.01 SJOGREN'S SYNDROME WITH KERATOCONJUNCTIVITIS SICCA (MULTI): ICD-10-CM

## 2024-08-08 DIAGNOSIS — M54.16 LUMBAR RADICULOPATHY: ICD-10-CM

## 2024-08-08 DIAGNOSIS — R56.9 FOCAL SEIZURES (MULTI): ICD-10-CM

## 2024-08-09 DIAGNOSIS — E11.9 TYPE 2 DIABETES MELLITUS WITHOUT COMPLICATION, UNSPECIFIED WHETHER LONG TERM INSULIN USE (MULTI): ICD-10-CM

## 2024-08-09 LAB
ATRIAL RATE: 91 BPM
ATRIAL RATE: 93 BPM
P AXIS: 47 DEGREES
P AXIS: 51 DEGREES
PR INTERVAL: 189 MS
PR INTERVAL: 189 MS
Q ONSET: 253 MS
Q ONSET: 253 MS
QRS COUNT: 14 BEATS
QRS COUNT: 15 BEATS
QRS DURATION: 95 MS
QRS DURATION: 96 MS
QT INTERVAL: 379 MS
QT INTERVAL: 380 MS
QTC CALCULATION(BAZETT): 465 MS
QTC CALCULATION(BAZETT): 469 MS
QTC FREDERICIA: 435 MS
QTC FREDERICIA: 437 MS
R AXIS: 0 DEGREES
R AXIS: 13 DEGREES
T AXIS: 100 DEGREES
T AXIS: 74 DEGREES
T OFFSET: 442 MS
T OFFSET: 443 MS
VENTRICULAR RATE: 90 BPM
VENTRICULAR RATE: 92 BPM

## 2024-08-12 ENCOUNTER — PATIENT MESSAGE (OUTPATIENT)
Dept: ENDOCRINOLOGY | Facility: CLINIC | Age: 49
End: 2024-08-12
Payer: MEDICAID

## 2024-08-12 PROCEDURE — RXMED WILLOW AMBULATORY MEDICATION CHARGE

## 2024-08-12 RX ORDER — DAPAGLIFLOZIN 5 MG/1
5 TABLET, FILM COATED ORAL DAILY
Qty: 90 TABLET | Refills: 3 | Status: SHIPPED | OUTPATIENT
Start: 2024-08-12 | End: 2025-08-12

## 2024-08-14 DIAGNOSIS — E11.43 TYPE 2 DIABETES MELLITUS WITH DIABETIC AUTONOMIC NEUROPATHY, WITH LONG-TERM CURRENT USE OF INSULIN (MULTI): Primary | ICD-10-CM

## 2024-08-14 DIAGNOSIS — Z79.4 TYPE 2 DIABETES MELLITUS WITH DIABETIC AUTONOMIC NEUROPATHY, WITH LONG-TERM CURRENT USE OF INSULIN (MULTI): Primary | ICD-10-CM

## 2024-08-14 RX ORDER — INSULIN LISPRO 100 [IU]/ML
INJECTION, SOLUTION INTRAVENOUS; SUBCUTANEOUS
Qty: 90 ML | Refills: 3 | Status: SHIPPED | OUTPATIENT
Start: 2024-08-14

## 2024-08-15 ENCOUNTER — TELEPHONE (OUTPATIENT)
Dept: ENDOCRINOLOGY | Facility: CLINIC | Age: 49
End: 2024-08-15
Payer: MEDICAID

## 2024-08-18 ENCOUNTER — APPOINTMENT (OUTPATIENT)
Dept: OPHTHALMOLOGY | Facility: CLINIC | Age: 49
End: 2024-08-18
Payer: MEDICAID

## 2024-08-18 PROCEDURE — 95930 VISUAL EP TEST CNS W/I&R: CPT | Performed by: PSYCHIATRY & NEUROLOGY

## 2024-08-18 PROCEDURE — 92274 MULTIFOCAL ERG W/I&R: CPT | Performed by: PSYCHIATRY & NEUROLOGY

## 2024-08-18 PROCEDURE — 92273 FULL FIELD ERG W/I&R: CPT | Performed by: PSYCHIATRY & NEUROLOGY

## 2024-08-19 ENCOUNTER — OFFICE VISIT (OUTPATIENT)
Dept: PAIN MEDICINE | Facility: HOSPITAL | Age: 49
End: 2024-08-19
Payer: MEDICAID

## 2024-08-19 ENCOUNTER — HOSPITAL ENCOUNTER (OUTPATIENT)
Dept: RADIOLOGY | Facility: HOSPITAL | Age: 49
Discharge: HOME | End: 2024-08-19
Payer: MEDICAID

## 2024-08-19 DIAGNOSIS — M51.36 DDD (DEGENERATIVE DISC DISEASE), LUMBAR: ICD-10-CM

## 2024-08-19 DIAGNOSIS — M51.36 DDD (DEGENERATIVE DISC DISEASE), LUMBAR: Primary | ICD-10-CM

## 2024-08-19 DIAGNOSIS — M47.817 LUMBOSACRAL SPONDYLOSIS WITHOUT MYELOPATHY: ICD-10-CM

## 2024-08-19 PROCEDURE — 3049F LDL-C 100-129 MG/DL: CPT | Performed by: PAIN MEDICINE

## 2024-08-19 PROCEDURE — 99213 OFFICE O/P EST LOW 20 MIN: CPT | Performed by: PAIN MEDICINE

## 2024-08-19 PROCEDURE — 72114 X-RAY EXAM L-S SPINE BENDING: CPT

## 2024-08-19 PROCEDURE — 72114 X-RAY EXAM L-S SPINE BENDING: CPT | Performed by: RADIOLOGY

## 2024-08-19 PROCEDURE — 3051F HG A1C>EQUAL 7.0%<8.0%: CPT | Performed by: PAIN MEDICINE

## 2024-08-19 PROCEDURE — 99203 OFFICE O/P NEW LOW 30 MIN: CPT | Performed by: PAIN MEDICINE

## 2024-08-19 ASSESSMENT — PAIN - FUNCTIONAL ASSESSMENT: PAIN_FUNCTIONAL_ASSESSMENT: 0-10

## 2024-08-19 ASSESSMENT — PAIN SCALES - GENERAL: PAINLEVEL_OUTOF10: 6

## 2024-08-19 NOTE — PROGRESS NOTES
"Subjective   Patient ID: Jonathan Ayala is a 48 y.o. female with a past medical history of T2DM, migraines, lumbar radiculopathy,      HPI:   Patient has several year history of lower back pain for which she was previously treated about 10 years ago at the [facility] with medial branch RFA.  The pain she has now is similar to the pain she had 10 years ago and started after she fell this June. Pain is in her low back in the midline with radiation to her paraspinal muscles bilaterally and upper buttock area.  It is 9/10 after standing for a long time and is worse with twisting.  The pain is throbbing and sharp in nature.  She denies shooting pain down her legs, numbness, tingling, weakness, saddle anesthesia.  She has incontinence of urine, but this is related to a hysterectomy and has not changed in years. She is not currently taking medication for pain at home.     Physical Therapy: The patient has not done physical therapy within the past six months  Other Conservative Measures she has tried: Ice and Injections  Classes of medications tried in the past: Acetaminophen, NSAIDs, Gabapentenoids, and Topical agents      Review of Systems   13-point ROS done and negative except for HPI.     Current Outpatient Medications   Medication Instructions    Advair -21 mcg/actuation inhaler INHALE TWO PUFFS BY MOUTH AS INSTRUCTED TWO TIMES A DAY.    amitriptyline (ELAVIL) 100 mg, oral, Nightly    amLODIPine (NORVASC) 5 mg, oral, Daily    ascorbic acid (VITAMIN C) 1,000 mg, oral, Daily    BD Ultra-Fine Mini Pen Needle 31 gauge x 3/16\" needle USE AS DIRECTED FIVE TIMES A DAY.    carboxymethylcellulose (Refresh Celluvisc) 1 % ophthalmic solution dropperette ophthalmic (eye)    cholecalciferol (Vitamin D-3) 5,000 Units tablet oral    clonazePAM (KlonoPIN) 0.5 mg tablet 1 tablet, oral, 2 times daily, at the same time.    clotrimazole (Lotrimin) 1 % cream     cyclobenzaprine (Flexeril) 10 mg tablet     dapagliflozin " propanediol (FARXIGA) 5 mg, oral, Daily    diclofenac (VOLTAREN) 50 mg, oral, 3 times daily PRN, 30 day supply    diclofenac sodium (VOLTAREN ARTHRITIS PAIN) 4 g, Topical, 3 times daily PRN    divalproex (Depakote ER) 500 mg 24 hr tablet 1 tablet, oral, 2 times daily    EPINEPHrine 0.3 mg/0.3 mL injection syringe INJECT INTRAMUSCULARLY ONCE AS NEEDED FOR ANAPHYLAXIS    ezetimibe (ZETIA) 10 mg, oral, Daily    fluticasone (Flonase) 50 mcg/actuation nasal spray USE 1 SPRAY INTO EACH NOSTRIL ONCE DAILY.    folic acid (FOLVITE) 1 mg, oral, Daily    furosemide (LASIX) 20 mg, oral, 2 times daily    glucagon (Baqsimi) 3 mg/actuation spray,non-aerosol USE ONE SPRAY IN THE NOSE AS NEEDED FOR LOW BLOOD SUGAR  MAY REPEAT AFTER 15 MINUTES USING A NEW DEVICE IF NO RESPONSE    hydrocortisone (Cortef) 10 mg tablet TAKE ONE TABLET BY MOUTH TWO TIMES A DAY; TAKE DOUBLE DOSES FOR 3 DAYS WHEN ILL    immune globulin, human, (Gammagard) infusion     insulin glargine (Toujeo Max Solostar- 2 unit dial) 300 unit/mL (3 mL) injection Inject 50 units under skin once daily    insulin lispro (HumaLOG KwikPen Insulin) 100 unit/mL injection Use as directed to give up to 100 units a day    insulin pump cart,automated,BT (Omnipod 5 G6 Pods, Gen 5,) cartridge 1 Device, subcutaneous, Every 48 hours    insulin pump cart,cont inf,BT (Omnipod Dash Pods, Gen 4,) cartridge subcutaneous    ipratropium-albuteroL (Duo-Neb) 0.5-2.5 mg/3 mL nebulizer solution 3 mL, inhalation, 4 times daily PRN    lacosamide (Vimpat) 200 mg tablet tablet 1 tablet, oral, 2 times daily    lacosamide (Vimpat) 50 mg tablet Take 1 tablet (50 mg) by mouth every 12 hours.    lasmiditan 100 mg, oral, As needed, Do not drive in 8 ours of taking this medicine.    levETIRAcetam (KEPPRA) 1,500 mg, oral, 2 times daily    levothyroxine (SYNTHROID, LEVOXYL) 75 mcg, oral, Daily before breakfast    montelukast (SINGULAIR) 10 mg, oral, Nightly    Motegrity 2 mg tablet 1 tablet, oral, Daily     mupirocin (Bactroban) 2 % ointment apply a small amount to the affected area topically three times a day.    naratriptan (AMERGE) 1 mg, oral, Once as needed, May repeat in 4 hours if unresolved. Do not exceed 5 mg in 24 hours.    nasal spray Nayzilam 5 mg/spray (0.1 mL) spray,non-aerosol nasal    OneTouch Delica Plus Lancet 33 gauge misc USE 1 LANCET TO CHECK GLUCOSE ONCE DAILY AS DIRECTED    OneTouch Verio test strips strip USE 1 STRIP TO CHECK GLUCOSE ONCE DAILY AS DIRECTED    pantoprazole (PROTONIX) 40 mg, oral, Daily, Do not crush, chew, or split.    promethazine (Phenergan) 12.5 mg tablet Take 1 tablet (12.5 mg) by mouth if needed.    propranolol LA (INDERAL LA) 60 mg, oral, Daily, Do not crush, chew, or split.    rimegepant (NURTEC ODT) 75 mg, oral, As needed    rOPINIRole (REQUIP) 0.5 mg, oral, Every morning, 1 tab in AM and 2 tabs in PM    senna 8.6 mg tablet 1-2 tablets, oral, Nightly PRN    SUMAtriptan (IMITREX) 25 mg, oral, Once as needed, May repeat dose once in 2 hours if no relief.  Do not exceed 2 doses in 24 hours.    tiZANidine (ZANAFLEX) 2 mg, oral, Every 8 hours PRN    ZINC ORAL 100 mg, oral, Daily, Take as directed       Past Medical History:   Diagnosis Date    Abnormal findings on diagnostic imaging of other abdominal regions, including retroperitoneum 10/14/2020    Abnormal CT of the abdomen    Acquired deformity of nose 03/24/2022    Nasal deformity    Acute upper respiratory infection, unspecified 10/16/2019    Acute URI    Allergy status to unspecified drugs, medicaments and biological substances 05/22/2020    History of drug allergy    Allergy status to unspecified drugs, medicaments and biological substances 11/13/2020    History of adverse drug reaction    Benign intracranial hypertension 01/27/2022    Pseudotumor cerebri    Bipolar disorder, unspecified (Multi)     Bipolar disease, chronic    Breast calcification, right 08/21/2018    Cellulitis of abdominal wall 09/28/2022    Cellulitis  of right abdominal wall    Cervicalgia 07/01/2020    Cervicalgia of ylavyrtm-gedxerz-egvrr region    Chronic maxillary sinusitis 01/04/2022    Chronic maxillary sinusitis    Chronic sialoadenitis 03/16/2020    Chronic sialoadenitis    COVID-19 01/06/2022    COVID-19 with multiple comorbidities    Decreased white blood cell count, unspecified 11/04/2019    Leukopenia    Disease of thyroid gland     Disturbances of salivary secretion 03/16/2020    Xerostomia    Dry eye syndrome of bilateral lacrimal glands 10/07/2022    Dry eyes, bilateral    Encounter for preprocedural cardiovascular examination 02/01/2022    Preoperative cardiovascular examination    Food additives allergy status 06/11/2020    Allergy to food dye    Fracture of nasal bones, initial encounter for closed fracture 03/03/2022    Closed fracture of nasal bone, initial encounter    Granuloma of right orbit 10/07/2021    Inflammatory pseudotumor of right orbit    History of endometrial ablation 11/09/2017    Hyperlipidemia     Hypertension     Hyperthyroidism     Hypoglycemia     Hypothyroidism     Localized swelling, mass and lump, head 03/24/2022    Swollen nose    Major depressive disorder, recurrent, in full remission ([facility]) 10/07/2021    Depression, major, recurrent, in complete remission    Mammary duct ectasia of left breast 08/24/2022    Periductal mastitis of left breast    Migraine     Nipple discharge 08/24/2022    Bloody discharge from left nipple    Ocular pain, right eye 10/07/2022    Pain in right eye    Optic atrophy     Other abnormal and inconclusive findings on diagnostic imaging of breast 07/06/2020    Other abnormal and inconclusive findings on diagnostic imaging of breast    Other anomalies of pupillary function 05/31/2019    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    Other chest pain 05/18/2020    Chest discomfort    Other conditions influencing health status 08/01/2022    History of cough    Other conditions influencing  health status 08/03/2021    Chronic migraine    Other specified disorders of eustachian tube, left ear 11/18/2019    ETD (Eustachian tube dysfunction), left    Other specified disorders of nose and nasal sinuses 03/24/2022    Nasal dryness    Other specified disorders of nose and nasal sinuses 03/24/2022    Nasal crusting    Pelvic and perineal pain 07/06/2020    Pelvic pain    Personal history of other diseases of the circulatory system 04/07/2020    History of sinus tachycardia    Personal history of other diseases of the circulatory system 04/07/2020    History of abnormal electrocardiography    Personal history of other diseases of the circulatory system     History of Raynaud's syndrome    Personal history of other diseases of the digestive system     History of irritable bowel syndrome    Personal history of other diseases of the digestive system 03/02/2020    History of oral pain    Personal history of other diseases of the musculoskeletal system and connective tissue 01/19/2022    History of neck pain    Personal history of other diseases of the musculoskeletal system and connective tissue 03/02/2021    History of scleroderma    Personal history of other diseases of the musculoskeletal system and connective tissue 06/16/2020    History of muscle weakness    Personal history of other diseases of the nervous system and sense organs 11/18/2019    History of hearing loss    Personal history of other diseases of the nervous system and sense organs 09/21/2022    History of partial seizures    Personal history of other diseases of the respiratory system 04/14/2021    History of asthma    Personal history of other endocrine, nutritional and metabolic disease 02/17/2021    History of diabetes mellitus    Personal history of other mental and behavioral disorders 05/27/2021    History of anxiety    Personal history of other specified conditions 09/07/2022    History of nipple discharge    Personal history of other  specified conditions 10/16/2019    History of headache    Personal history of other specified conditions 09/28/2022    History of lump of left breast    Personal history of other specified conditions 09/16/2021    History of persistent cough    Personal history of other specified conditions 02/01/2022    History of palpitations    Personal history of other specified conditions 03/09/2022    History of headache    Personal history of other specified conditions 02/12/2014    History of chest pain    Personal history of other specified conditions 10/27/2021    History of nausea and vomiting    Personal history of other specified conditions 10/16/2019    History of fatigue    Personal history of other specified conditions 02/26/2021    History of orthopnea    Personal history of other specified conditions 02/22/2021    History of shortness of breath    Personal history of urinary calculi     H/O renal calculi    Polycystic ovary syndrome     Postural orthostatic tachycardia syndrome (POTS)     POTS (postural orthostatic tachycardia syndrome)    Rash and other nonspecific skin eruption 03/15/2022    Rash    Repeated falls 06/23/2021    Recurrent falls    Right lower quadrant pain 10/14/2020    Abdominal pain, RLQ (right lower quadrant)    Sjogren syndrome, unspecified (Multi)     History of Sjogren's disease    Sjogren's syndrome (Multi)     Slow transit constipation 07/09/2020    Slow transit constipation    Subarachnoid hemorrhage, traumatic (Multi) 04/19/2023    Thyroid nodule     Traumatic subarachnoid hemorrhage with loss of consciousness of unspecified duration, subsequent encounter 03/15/2022    Subarachnoid hemorrhage following injury, with loss of consciousness, subsequent encounter    Traumatic subarachnoid hemorrhage without loss of consciousness, subsequent encounter     Subarachnoid hemorrhage following injury, no loss of consciousness, subsequent encounter    Type 2 diabetes mellitus (Multi)     Unspecified  disorder of refraction 10/07/2022    Refractive error    Unspecified optic neuritis 11/06/2020    Right optic neuritis    Unspecified optic neuritis 11/06/2020    Optic neuritis, right    Unspecified visual loss 09/25/2019    Vision loss    Venous insufficiency (chronic) (peripheral) 10/18/2021    Chronic venous insufficiency of lower extremity    Viral infection, unspecified 01/11/2022    Nonspecific syndrome suggestive of viral illness    Vitamin D deficiency     Vitamin D deficiency, unspecified 09/28/2022    Vitamin D deficiency        Past Surgical History:   Procedure Laterality Date    APPENDECTOMY  2017    HERNIA REPAIR Right 03/01/2024    with mesh    HYSTERECTOMY  2017    MR HEAD ANGIO WO IV CONTRAST  02/08/2021    MR HEAD ANGIO WO IV CONTRAST 2/8/2021 [facility]INICAL [facility]GAC[facility]    MR NECK ANGIO WO IV CONTRAST  02/08/2021    MR NECK ANGIO WO IV CONTRAST 2/8/2021 [facility] CLINICAL LEGACY    OTHER SURGICAL HISTORY  08/22/2019    Carpal tunnel surgery    OTHER SURGICAL HISTORY  08/22/2019    Hysterectomy    OTHER SURGICAL HISTORY  08/22/2019    Venous access port placement    OTHER SURGICAL HISTORY  08/22/2019    Cholecystectomy    OTHER SURGICAL HISTORY  08/22/2019    Appendectomy    OTHER SURGICAL HISTORY  08/22/2019    Pyloroplasty    WISDOM TOOTH EXTRACTION  2004        Family History   Problem Relation Name Age of Onset    Other (Perforated bowel) Mother Radha     Hypertension Mother Radha     Macular degeneration Mother Radha     Hyperlipidemia Father Mac     Hypertension Father Mac     Heart attack Father Mac     Other (S/P CABG) Father Mac     Diabetes Father Mac     Prostate cancer Father Mac     Coronary artery disease Father Mac     Heart failure Father Mac     Stroke Father Mac     Cancer Father Mac     Macular degeneration Father Mac     Retinal detachment Father Mac     Stroke Sister Jazmin     Breast cancer Sister Jazmin     Lupus Sister Jazmin     Ovarian cancer Sister Jazmin      Astigmatism Sister Jazmin     Hyperlipidemia Brother Sanchez     Hypertension Brother Sanchez     Astigmatism Brother Sanchez     Diabetes Father's Sister Linda     Blindness Father's Sister Linda     Thyroid cancer Father's Sister Bekah     Thyroid disease Father's Sister Jessica     Colon cancer Father's Brother ?     Blindness Other Multiple     Melanoma Other      Asthma Other      Other (hay fever) Other      Allergies Other      Cancer Maternal Grandmother Brenda     Cancer Father's Brother Kian         Allergies   Allergen Reactions    Acetazolamide Hives, Rash, Shortness of breath and Swelling     oral hives, redness, ABD pain    Atorvastatin Unknown     Causes muscle pain    Cefdinir Itching, Rash, Shortness of breath and Anaphylaxis     Itchy throat    Throat closing / difficulty breathing.  Took Benadryl at home.    Itchy throat      Throat closing / difficulty breathing.  Took Benadryl at home.    Ceftriaxone Anaphylaxis, Rash, Shortness of breath and Swelling     SOB    SOB hives turned red during gallbladder    Doxepin Anaphylaxis, Hives, Swelling, Angioedema and Rash     Itchy sore throat problems with swallowing    facial swelling    Itchy sore throat problems with swallowing      facial swelling    Duloxetine Anaphylaxis, Hallucinations and Rash     suicidal ideation    suicidal ideation     Other reaction(s): suicidal ideation    suicidal    Suicidal ideation    Levofloxacin Angioedema, Swelling and Rash     eyelid swelling    eyelid swelling. Patient tolerates Avelox    Levofloxacin In D5w Anaphylaxis     Moon face lips swelling just Levaquin tolerated D5W)    Nutritional Supplements Anaphylaxis     Grapes ( red dye    Ozempic [Semaglutide] Nausea/vomiting     Severe gastroparesis worsening     Prochlorperazine Anaphylaxis     HIGH Compazine involuntary muscle spasms low doses tolerated)    Red Dye Anaphylaxis    Becky Anaphylaxis and Swelling     Becky    SOB facial swelling    Rosemary Oil Anaphylaxis,  "Itching and Swelling     Becky  SOB facial swelling      SOB facial swelling    Strawberry Shortness of breath     White blisters in mouth      Other reaction(s): white blisters in mouth, shortness of breath    Sulfa (Sulfonamide Antibiotics) Anaphylaxis, Rash, Shortness of breath and Swelling     SOB itchy hives    Other Reaction(s): rash, SOB      SOB itchy hives    Topiramate Anaphylaxis, Swelling and Angioedema     eyelid swelling    Throat swelling    eyelid swelling  Throat swelling      Throat swelling      eyelid swelling    Tree Pollen-Black Elmer Shortness of breath     Other Reaction(s): Other: See Comments      White blisters in mouth      blisters in mouth-carries an EPIPEN-\"I have gotten short of breath\"    Tree Pollen-Pecan Shortness of breath     pecans    Elmer Shortness of breath    Aripiprazole Unknown, Fever and Other     fever and tremors    Fevers and Tremors    Tremor    Aspartame Diarrhea     Blood sugar Everett, Diarrhea    Aspartame (Bulk) Diarrhea, Nausea Only and Nausea/vomiting     Other Reaction(s): nausea, diarrhea, abdominal pain      Blood sugar Everett, Diarrhea    Fenofibrate Other     Double vision    Gluten Itching, Other and Rash     Rashes constipation gastric discomfort    Hydrochlorothiazide Hives, Itching and Rash     hctz removed as free-text allergy and entered as [facility] allergy,1455 10/6/2007JO      itchy hives    Hydromorphone Unknown, GI intolerance and Nausea Only     Intolerance nausea instant    Iron Hives and Rash     ichy hive ( can tolerate ferrous gluconate)    Meclizine Angioedema, Other and Swelling     eyelid swelling    Swelling of the eyelids    Eyelids and face    Metformin Other     Other reaction(s): Dyspepsia, Dyspepsia    Propoxyphene Unknown and Hives     Darvocet itchy hives    Statins-Hmg-Coa Reductase Inhibitors Unknown     Double vision    Tetracyclines Hives, Itching and Rash     sulfa    Rash itching    Itchy  rash    Thiazides Hives, Itching " and Rash     itchy hives    Venlafaxine Unknown and Fever     Fevers chills    Wheat Unknown     oral blisters    Adhesive Tape-Silicones Itching and Other    Barbiturates Unknown    Ciprofloxacin Hives    Dhe Unknown     Raynaud's disease    Diet Foods Diarrhea     Aspartame allergy only. Removes to many foods to interface.    Farxiga [Dapagliflozin] Other     Frequent yeast infections and UTI    Ferrous Sulfate Hives    Nsaids (Non-Steroidal Anti-Inflammatory Drug) Unknown and Nausea/vomiting    Other Unknown    Sulfonylureas Unknown    Tobramycin Unknown    Ubrelvy [Ubrogepant] Hives    Vancomycin Unknown and Hives     Niyah syndrome    Other reaction(s): Other    Red man syndrome if given fast, benadryl with.     Niyah syndrome  Other reaction(s): Other      Other reaction(s): Other      Red man syndrome if given fast, benadryl with.    Adhesive Rash    Azithromycin Hives, Itching and Rash    Betamethasone Nausea And Vomiting, Other, GI intolerance and Diarrhea     N/V/D    House Dust Rash    Metoclopramide Hcl Other    Milk Unknown, Itching and Rash     ABD pain    Prednisone Rash    Propoxyphene-Acetaminophen Hives and Rash    Sulfacetamide Sodium Rash and Swelling    [id number] Antimigraine Agents Nausea Only         None due to Raynaud's  ( Triptans cause a stroke)    Zolpidem Other and Hallucinations     Sleep walk    Other Reaction(s): insomnia and agitation      Sleep walk        Objective     There were no vitals filed for this visit.     Physical Exam  General: NAD, well groomed, well nourished  Eyes: Non-icteric sclera, EOMI  Ears, Nose, Mouth, and Throat: External ears and nose appear to be without deformity or rash. No lesions or masses noted. Hearing is grossly intact.   Neck: Trachea midline  Respiratory: Nonlabored breathing   Cardiovascular: trace peripheral edema   Skin: No rashes or open lesions/ulcers identified on skin.    Back:   Palpation: Yes tenderness to palpation over lumbar  paraspinous muscles. Pain on midline.  No pain over SIJ, buttock.   Straight leg raise: does not reproduce their pain, bilaterally   MARCUS Maneuver does not reproduce pain bilaterally  Hyperlordosis of lumbar spine      Hip: No pain over greater trochanters. and Pain not reproduced with hip internal/external rotation.     Neurologic:   Cranial nerves grossly intact.   Strength +5/5 and symmetric plantar/dorsiflexion   Sensation: Normal to light touch throughout    Psychiatric: Alert, orientation to person, place, and time. Cooperative.      Assessment/Plan   Based on patient's HPI, PE, her symptoms are most likely related to facet arthritis.  This is supported by midline pain and pain at upper buttock area without radicular symptoms. Since her pain is similar to the pain she had years ago that was successfully treated with medial branch RFA, we will aim to repeat this procedure starting with xrays and diagnostic medial branch block.     Plan:  -Lumbar spine xrays with flexion and extension today  -Diagnostic medial branch block, level pending xrays from today  -Continue medications as prescribed  -Continue home exercises      The patient has failed treatment with : have significant limitations in their sleep quality due to the pain and have significant limitations of their quality of life due to the pain    We discussed  the risks, benefits and alternatives of the procedure including but not limited to: , Lack of efficacy , Transiently worsening pain , Bleeding, Infection , and Nerve Damage    Follow up: After procedure    The patient was invited to contact us back anytime with any questions or concerns and follow-up with us in the office as needed.     Diagnoses and all orders for this visit:  DDD (degenerative disc disease), lumbar  -     XR lumbar spine 6+ views including oblique flexion extension; Future  Lumbosacral spondylosis without myelopathy  -     XR lumbar spine 6+ views including oblique flexion  extension; Future      This note was generated with the aid of dictation software, there may be typos despite my attempts at proofreading.      Sydnee Patton, DO  Anesthesiology, PGY-1

## 2024-08-23 DIAGNOSIS — M46.1 BILATERAL SACROILIITIS (CMS-HCC): ICD-10-CM

## 2024-08-26 PROCEDURE — RXMED WILLOW AMBULATORY MEDICATION CHARGE

## 2024-08-27 ENCOUNTER — HOSPITAL ENCOUNTER (EMERGENCY)
Facility: HOSPITAL | Age: 49
Discharge: HOME | End: 2024-08-28
Attending: STUDENT IN AN ORGANIZED HEALTH CARE EDUCATION/TRAINING PROGRAM
Payer: MEDICAID

## 2024-08-27 ENCOUNTER — PHARMACY VISIT (OUTPATIENT)
Dept: PHARMACY | Facility: CLINIC | Age: 49
End: 2024-08-27
Payer: MEDICAID

## 2024-08-27 ENCOUNTER — APPOINTMENT (OUTPATIENT)
Dept: CARDIOLOGY | Facility: HOSPITAL | Age: 49
End: 2024-08-27
Payer: MEDICAID

## 2024-08-27 ENCOUNTER — SPECIALTY PHARMACY (OUTPATIENT)
Dept: PHARMACY | Facility: CLINIC | Age: 49
End: 2024-08-27

## 2024-08-27 DIAGNOSIS — G43.109 COMPLICATED MIGRAINE: Primary | ICD-10-CM

## 2024-08-27 LAB — GLUCOSE BLD MANUAL STRIP-MCNC: 170 MG/DL (ref 74–99)

## 2024-08-27 PROCEDURE — 99285 EMERGENCY DEPT VISIT HI MDM: CPT

## 2024-08-27 PROCEDURE — 93005 ELECTROCARDIOGRAM TRACING: CPT

## 2024-08-27 PROCEDURE — 82947 ASSAY GLUCOSE BLOOD QUANT: CPT

## 2024-08-28 ENCOUNTER — APPOINTMENT (OUTPATIENT)
Dept: RADIOLOGY | Facility: HOSPITAL | Age: 49
End: 2024-08-28
Payer: MEDICAID

## 2024-08-28 VITALS
OXYGEN SATURATION: 96 % | HEART RATE: 87 BPM | RESPIRATION RATE: 16 BRPM | BODY MASS INDEX: 29.08 KG/M2 | WEIGHT: 158 LBS | TEMPERATURE: 97.7 F | SYSTOLIC BLOOD PRESSURE: 135 MMHG | DIASTOLIC BLOOD PRESSURE: 82 MMHG | HEIGHT: 62 IN

## 2024-08-28 LAB
ANION GAP SERPL CALC-SCNC: 12 MMOL/L (ref 10–20)
ATRIAL RATE: 105 BPM
B-HCG SERPL-ACNC: <2 MIU/ML
BUN SERPL-MCNC: 19 MG/DL (ref 6–23)
CALCIUM SERPL-MCNC: 9 MG/DL (ref 8.6–10.3)
CARDIAC TROPONIN I PNL SERPL HS: <3 NG/L (ref 0–13)
CARDIAC TROPONIN I PNL SERPL HS: <3 NG/L (ref 0–13)
CHLORIDE SERPL-SCNC: 103 MMOL/L (ref 98–107)
CO2 SERPL-SCNC: 28 MMOL/L (ref 21–32)
CREAT SERPL-MCNC: 0.88 MG/DL (ref 0.5–1.05)
EGFRCR SERPLBLD CKD-EPI 2021: 82 ML/MIN/1.73M*2
ERYTHROCYTE [DISTWIDTH] IN BLOOD BY AUTOMATED COUNT: 14.4 % (ref 11.5–14.5)
GLUCOSE BLD MANUAL STRIP-MCNC: 75 MG/DL (ref 74–99)
GLUCOSE SERPL-MCNC: 135 MG/DL (ref 74–99)
HCT VFR BLD AUTO: 37.2 % (ref 36–46)
HGB BLD-MCNC: 12.2 G/DL (ref 12–16)
MCH RBC QN AUTO: 29.8 PG (ref 26–34)
MCHC RBC AUTO-ENTMCNC: 32.8 G/DL (ref 32–36)
MCV RBC AUTO: 91 FL (ref 80–100)
NRBC BLD-RTO: 0 /100 WBCS (ref 0–0)
P AXIS: 52 DEGREES
PLATELET # BLD AUTO: 287 X10*3/UL (ref 150–450)
POTASSIUM SERPL-SCNC: 4.1 MMOL/L (ref 3.5–5.3)
PR INTERVAL: 194 MS
Q ONSET: 253 MS
QRS COUNT: 17 BEATS
QRS DURATION: 101 MS
QT INTERVAL: 362 MS
QTC CALCULATION(BAZETT): 479 MS
QTC FREDERICIA: 436 MS
R AXIS: 0 DEGREES
RBC # BLD AUTO: 4.09 X10*6/UL (ref 4–5.2)
SCAN RESULT: NORMAL
SODIUM SERPL-SCNC: 139 MMOL/L (ref 136–145)
T AXIS: 68 DEGREES
T OFFSET: 434 MS
TSH SERPL-ACNC: 1.29 MIU/L (ref 0.44–3.98)
VALPROATE FREE SERPL-MCNC: 17.1 UG/ML (ref 4–30)
VENTRICULAR RATE: 105 BPM
WBC # BLD AUTO: 5 X10*3/UL (ref 4.4–11.3)

## 2024-08-28 PROCEDURE — 82947 ASSAY GLUCOSE BLOOD QUANT: CPT

## 2024-08-28 PROCEDURE — 84443 ASSAY THYROID STIM HORMONE: CPT | Performed by: STUDENT IN AN ORGANIZED HEALTH CARE EDUCATION/TRAINING PROGRAM

## 2024-08-28 PROCEDURE — 96375 TX/PRO/DX INJ NEW DRUG ADDON: CPT

## 2024-08-28 PROCEDURE — 2500000005 HC RX 250 GENERAL PHARMACY W/O HCPCS: Performed by: EMERGENCY MEDICINE

## 2024-08-28 PROCEDURE — 96367 TX/PROPH/DG ADDL SEQ IV INF: CPT

## 2024-08-28 PROCEDURE — 36415 COLL VENOUS BLD VENIPUNCTURE: CPT | Performed by: STUDENT IN AN ORGANIZED HEALTH CARE EDUCATION/TRAINING PROGRAM

## 2024-08-28 PROCEDURE — 96365 THER/PROPH/DIAG IV INF INIT: CPT

## 2024-08-28 PROCEDURE — 84484 ASSAY OF TROPONIN QUANT: CPT | Performed by: STUDENT IN AN ORGANIZED HEALTH CARE EDUCATION/TRAINING PROGRAM

## 2024-08-28 PROCEDURE — 70498 CT ANGIOGRAPHY NECK: CPT

## 2024-08-28 PROCEDURE — 70498 CT ANGIOGRAPHY NECK: CPT | Performed by: RADIOLOGY

## 2024-08-28 PROCEDURE — 96366 THER/PROPH/DIAG IV INF ADDON: CPT

## 2024-08-28 PROCEDURE — 80165 DIPROPYLACETIC ACID FREE: CPT | Performed by: STUDENT IN AN ORGANIZED HEALTH CARE EDUCATION/TRAINING PROGRAM

## 2024-08-28 PROCEDURE — 2550000001 HC RX 255 CONTRASTS: Performed by: EMERGENCY MEDICINE

## 2024-08-28 PROCEDURE — 70496 CT ANGIOGRAPHY HEAD: CPT | Performed by: RADIOLOGY

## 2024-08-28 PROCEDURE — 85027 COMPLETE CBC AUTOMATED: CPT | Performed by: STUDENT IN AN ORGANIZED HEALTH CARE EDUCATION/TRAINING PROGRAM

## 2024-08-28 PROCEDURE — 70450 CT HEAD/BRAIN W/O DYE: CPT

## 2024-08-28 PROCEDURE — 2500000004 HC RX 250 GENERAL PHARMACY W/ HCPCS (ALT 636 FOR OP/ED): Performed by: EMERGENCY MEDICINE

## 2024-08-28 PROCEDURE — 84702 CHORIONIC GONADOTROPIN TEST: CPT | Performed by: STUDENT IN AN ORGANIZED HEALTH CARE EDUCATION/TRAINING PROGRAM

## 2024-08-28 PROCEDURE — 80048 BASIC METABOLIC PNL TOTAL CA: CPT | Performed by: STUDENT IN AN ORGANIZED HEALTH CARE EDUCATION/TRAINING PROGRAM

## 2024-08-28 RX ORDER — MAGNESIUM SULFATE 1 G/100ML
1 INJECTION INTRAVENOUS ONCE
Status: COMPLETED | OUTPATIENT
Start: 2024-08-28 | End: 2024-08-28

## 2024-08-28 RX ORDER — ONDANSETRON HYDROCHLORIDE 2 MG/ML
4 INJECTION, SOLUTION INTRAVENOUS ONCE
Status: COMPLETED | OUTPATIENT
Start: 2024-08-28 | End: 2024-08-28

## 2024-08-28 ASSESSMENT — ENCOUNTER SYMPTOMS
FEVER: 0
BACK PAIN: 0
HEADACHES: 1
NAUSEA: 0
ABDOMINAL PAIN: 0
DIARRHEA: 0
CHILLS: 0
SHORTNESS OF BREATH: 0
VOMITING: 0
WEAKNESS: 1
COUGH: 0
NECK PAIN: 0
NUMBNESS: 0
DIZZINESS: 0

## 2024-08-28 ASSESSMENT — LIFESTYLE VARIABLES
EVER HAD A DRINK FIRST THING IN THE MORNING TO STEADY YOUR NERVES TO GET RID OF A HANGOVER: NO
HAVE YOU EVER FELT YOU SHOULD CUT DOWN ON YOUR DRINKING: NO
EVER FELT BAD OR GUILTY ABOUT YOUR DRINKING: NO
TOTAL SCORE: 0
HAVE PEOPLE ANNOYED YOU BY CRITICIZING YOUR DRINKING: NO

## 2024-08-28 ASSESSMENT — PAIN SCALES - GENERAL
PAINLEVEL_OUTOF10: 7
PAINLEVEL_OUTOF10: 3
PAINLEVEL_OUTOF10: 8

## 2024-08-28 ASSESSMENT — PAIN DESCRIPTION - LOCATION: LOCATION: HEAD

## 2024-08-28 ASSESSMENT — PAIN - FUNCTIONAL ASSESSMENT: PAIN_FUNCTIONAL_ASSESSMENT: 0-10

## 2024-08-28 NOTE — ED PROVIDER NOTES
"[facility] EMERGENCY DEPARTMENT ENCOUNTER    Pt Name:Jonathan Ayala  MRN: [id number]  Birthdate 1975  Date of evaluation: 08/28/24  PCP:  Isidoro Mensah DO    CHIEF COMPLAINT       Chief Complaint   Patient presents with    .     Migraine, took a sumatriptan for the first time today but states it is worse now. N/V, left sided weakness started around 9 pm      HISTORY OF PRESENT ILLNESS  (Location/Symptom, Timing/Onset, Context/Setting, Quality, Duration, Modifying Factors, Severity.)      Jonathan Ayala is a 48 y.o. female who presents with headache and history of complex migraines.  Patient states she took sumatriptan earlier today despite having had previous adverse reactions to this medication.  She states shortly thereafter her headache worsened slightly, and she began experiencing subjective left-sided weakness.  She does have a history of experiencing similar weakness with migraines in the past, and also has a history of seizures on valproic acid.  She states her migraine \"wraps around the whole head, but is worse in the right temple\".  This is usual for her.  She states she also has a history of prior intracranial bleed after an intracranial procedure, but does not of any history of aneurysm.  She is not currently on any anticoagulation.  She denies any trauma or falls.  She denies lightheadedness, changes in vision, dizziness, syncope, numbness or tingling, chest pain, palpitations, shortness of breath, abdominal pain, nausea, vomiting, diarrhea.  She denies any recent seizure activity and states has not missed any doses of her seizure medications.    Of note, patient also notes that she has been hypoglycemic over the past day.  She has had glucose readings at home as low as 40, and has corrected these with p.o. intake.    PAST MEDICAL / SURGICAL / SOCIAL / FAMILY HISTORY      has a past medical history of Abnormal findings on diagnostic imaging of other abdominal regions, including retroperitoneum " (10/14/2020), Acquired deformity of nose (03/24/2022), Acute upper respiratory infection, unspecified (10/16/2019), Allergy status to unspecified drugs, medicaments and biological substances (05/22/2020), Allergy status to unspecified drugs, medicaments and biological substances (11/13/2020), Benign intracranial hypertension (01/27/2022), Bipolar disorder, unspecified (Multi), Breast calcification, right (08/21/2018), Cellulitis of abdominal wall (09/28/2022), Cervicalgia (07/01/2020), Chronic maxillary sinusitis (01/04/2022), Chronic sialoadenitis (03/16/2020), COVID-19 (01/06/2022), Decreased white blood cell count, unspecified (11/04/2019), Disease of thyroid gland, Disturbances of salivary secretion (03/16/2020), Dry eye syndrome of bilateral lacrimal glands (10/07/2022), Encounter for preprocedural cardiovascular examination (02/01/2022), Food additives allergy status (06/11/2020), Fracture of nasal bones, initial encounter for closed fracture (03/03/2022), Granuloma of right orbit (10/07/2021), History of endometrial ablation (11/09/2017), Hyperlipidemia, Hypertension, Hyperthyroidism, Hypoglycemia, Hypothyroidism, Localized swelling, mass and lump, head (03/24/2022), Major depressive disorder, recurrent, in full remission (CMS-HCC) (10/07/2021), 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), Migraine, Nipple discharge (08/24/2022), Ocular pain, right eye (10/07/2022), Optic atrophy, Other abnormal and inconclusive findings on diagnostic imaging of breast (07/06/2020), Other anomalies of pupillary function (05/31/2019), Other chest pain (05/18/2020), Other conditions influencing health status (08/01/2022), Other conditions influencing health status (08/03/2021), Other specified disorders of eustachian tube, left ear (11/18/2019), Other specified disorders of nose and nasal sinuses (03/24/2022), Other specified disorders of nose and nasal sinuses (03/24/2022), Pelvic and perineal pain (07/06/2020), Personal  history of other diseases of the circulatory system (04/07/2020), Personal history of other diseases of the circulatory system (04/07/2020), Personal history of other diseases of the circulatory system, Personal history of other diseases of the digestive system, Personal history of other diseases of the digestive system (03/02/2020), Personal history of other diseases of the musculoskeletal system and connective tissue (01/19/2022), Personal history of other diseases of the musculoskeletal system and connective tissue (03/02/2021), Personal history of other diseases of the musculoskeletal system and connective tissue (06/16/2020), Personal history of other diseases of the nervous system and sense organs (11/18/2019), Personal history of other diseases of the nervous system and sense organs (09/21/2022), Personal history of other diseases of the respiratory system (04/14/2021), Personal history of other endocrine, nutritional and metabolic disease (02/17/2021), Personal history of other mental and behavioral disorders (05/27/2021), Personal history of other specified conditions (09/07/2022), Personal history of other specified conditions (10/16/2019), Personal history of other specified conditions (09/28/2022), Personal history of other specified conditions (09/16/2021), Personal history of other specified conditions (02/01/2022), Personal history of other specified conditions (03/09/2022), Personal history of other specified conditions (02/12/2014), Personal history of other specified conditions (10/27/2021), Personal history of other specified conditions (10/16/2019), Personal history of other specified conditions (02/26/2021), Personal history of other specified conditions (02/22/2021), Personal history of urinary calculi, Polycystic ovary syndrome, Postural orthostatic tachycardia syndrome (POTS), Rash and other nonspecific skin eruption (03/15/2022), Repeated falls (06/23/2021), Right lower quadrant pain  (10/14/2020), Sjogren syndrome, unspecified (Multi), Sjogren's syndrome (Multi), Slow transit constipation (07/09/2020), Subarachnoid hemorrhage, traumatic (Multi) (04/19/2023), Thyroid nodule, Traumatic subarachnoid hemorrhage with loss of consciousness of unspecified duration, subsequent encounter (03/15/2022), Traumatic subarachnoid hemorrhage without loss of consciousness, subsequent encounter, Type 2 diabetes mellitus (Multi), Unspecified disorder of refraction (10/07/2022), Unspecified optic neuritis (11/06/2020), Unspecified optic neuritis (11/06/2020), Unspecified visual loss (09/25/2019), Venous insufficiency (chronic) (peripheral) (10/18/2021), Viral infection, unspecified (01/11/2022), Vitamin D deficiency, and Vitamin D deficiency, unspecified (09/28/2022).     has a past surgical history that includes Other surgical history (08/22/2019); Other surgical history (08/22/2019); Other surgical history (08/22/2019); Other surgical history (08/22/2019); Other surgical history (08/22/2019); Other surgical history (08/22/2019); MR angio neck wo IV contrast (02/08/2021); MR angio head wo IV contrast (02/08/2021); [place] tooth extraction (2004); Appendectomy (2017); Hysterectomy (2017); and Hernia repair (Right, 03/01/2024).      Social History     Socioeconomic History    Marital status:      Spouse name: Not on file    Number of children: Not on file    Years of education: Not on file    Highest education level: Not on file   Occupational History    Not on file   Tobacco Use    Smoking status: Never    Smokeless tobacco: Never   Vaping Use    Vaping status: Never Used   Substance and Sexual Activity    Alcohol use: Not Currently     Comment: Socially in College    Drug use: Yes     Types: Benzodiazepines    Sexual activity: Not Currently     Partners: Male     Birth control/protection: Abstinence, Surgical     Comment: Partial Hysterectomy   Other Topics Concern    Not on file   Social History Narrative     Not on file     Social Determinants of Health     Financial Resource Strain: Low Risk  (7/28/2021)    Received from [facility]    Overall Financial Resource Strain (CARDIA)     Difficulty of Paying Living Expenses: Not hard at all   Food Insecurity: No Food Insecurity (7/28/2021)    Received from [facility]    Hunger Vital Sign     Worried About Running Out of Food in the Last Year: Never true     Ran Out of Food in the Last Year: Never true   Transportation Needs: No Transportation Needs (7/28/2021)    Received from [facility]    PRAPARE - Transportation     Lack of Transportation (Medical): No     Lack of Transportation (Non-Medical): No   Physical Activity: Inactive (7/28/2021)    Received from [facility]    Exercise Vital Sign     Days of Exercise per Week: 0 days     Minutes of Exercise per Session: 0 min   Stress: Stress Concern Present (7/28/2021)    Received from [facility]    [place] [employer] - Occupational Stress Questionnaire     Feeling of Stress : To some extent   Social Connections: Moderately Integrated (7/28/2021)    Received from [facility] [employer]    Social Connection and Isolation Panel [NHANES]     Frequency of Communication with Friends and Family: Three times a week     Frequency of Social Gatherings with Friends and Family: Twice a week     Attends Temple Services: 1 to 4 times per year     Active Member of Clubs or Organizations: No     Attends Club or Organization Meetings: Never     Marital Status:    Intimate Partner Violence: Not on file   Housing Stability: Not on file       Family History   Problem Relation Name Age  of Onset    Other (Perforated bowel) Mother Radha     Hypertension Mother Radha     Macular degeneration Mother Radha     Hyperlipidemia Father Mac     Hypertension Father Mac     Heart attack Father Mac     Other (S/P CABG) Father Mac     Diabetes Father Mac     Prostate cancer Father Mac     Coronary artery disease Father Mac     Heart failure Father Mac     Stroke Father Mac     Cancer Father Mac     Macular degeneration Father Mac     Retinal detachment Father Mac     Stroke Sister Jazmin     Breast cancer Sister Jazmin     Lupus Sister Jazmin     Ovarian cancer Sister Jazmin     Astigmatism Sister Jazmin     Hyperlipidemia Brother Sanchez     Hypertension Brother Sanchez     Astigmatism Brother Sanchez     Diabetes Father's Sister Linda     Blindness Father's Sister Linda     Thyroid cancer Father's Sister Bekah     Thyroid disease Father's Sister Jessica     Colon cancer Father's Brother ?     Blindness Other Multiple     Melanoma Other      Asthma Other      Other (hay fever) Other      Allergies Other      Cancer Maternal Grandmother Brenda     Cancer Father's Brother Kian        Allergies:  Acetazolamide, Atorvastatin, Cefdinir, Ceftriaxone, Doxepin, Duloxetine, Levofloxacin, Levofloxacin in d5w, Nutritional supplements, Ozempic [semaglutide], Prochlorperazine, Red dye, Rosemary, Rosemary oil, Strawberry, Sulfa (sulfonamide antibiotics), Topiramate, Tree pollen-black walnut, Tree pollen-pecan, [place], Aripiprazole, Aspartame, Aspartame (bulk), Fenofibrate, Gluten, Hydrochlorothiazide, Hydromorphone, Iron, Meclizine, Metformin, Propoxyphene, Statins-hmg-coa reductase inhibitors, Tetracyclines, Thiazides, Venlafaxine, Wheat, Adhesive tape-silicones, Barbiturates, Ciprofloxacin, Dhe, Diet foods, Farxiga [dapagliflozin], Ferrous sulfate, Nsaids (non-steroidal anti-inflammatory drug), Other, Sulfonylureas, Tobramycin, Vancomycin, Adhesive, Azithromycin, Betamethasone, House dust, Metoclopramide hcl, Milk,  "Prednisone, Propoxyphene-acetaminophen, Sulfacetamide sodium, [id number] antimigraine agents, and Zolpidem    Home Medications:  Prior to Admission medications    Medication Sig Start Date End Date Taking? Authorizing Provider   Advair -21 mcg/actuation inhaler INHALE TWO PUFFS BY MOUTH AS INSTRUCTED TWO TIMES A DAY. 7/10/24   Historical Provider, MD   amitriptyline (Elavil) 100 mg tablet Take 1 tablet (100 mg) by mouth once daily at bedtime. 7/17/24 7/17/25  Isidoro Mensah DO   amLODIPine (Norvasc) 5 mg tablet Take 1 tablet (5 mg) by mouth once daily. 10/12/23 10/11/24  Historical Provider, MD   ascorbic acid (Vitamin C) 1,000 mg tablet Take 1 tablet (1,000 mg) by mouth once daily.    Historical Provider, MD   BD Ultra-Fine Mini Pen Needle 31 gauge x 3/16\" needle USE AS DIRECTED FIVE TIMES A DAY. 3/24/23   Historical Provider, MD   carboxymethylcellulose (Refresh Celluvisc) 1 % ophthalmic solution dropperette Administer into affected eye(s).    Historical Provider, MD   cholecalciferol (Vitamin D-3) 5,000 Units tablet Take by mouth. 11/2/22   Historical Provider, MD   clonazePAM (KlonoPIN) 0.5 mg tablet Take 1 tablet (0.5 mg) by mouth 2 times a day. at the same time.    Historical Provider, MD   clotrimazole (Lotrimin) 1 % cream     Historical Provider, MD   cyclobenzaprine (Flexeril) 10 mg tablet  7/17/24   Historical Provider, MD   dapagliflozin propanediol (Farxiga) 5 mg Take 1 tablet (5 mg) by mouth once daily. 8/12/24 8/12/25  Marah Felix MD   diclofenac (Voltaren) 50 mg EC tablet Take 1 tablet (50 mg) by mouth 3 times a day as needed (migraine). 30 day supply 1/18/24   Evelyn Bai MD   diclofenac sodium (Voltaren Arthritis Pain) 1 % gel Apply 4.5 inches (4 g) topically 3 times a day as needed (knee pain). 7/22/24   Marty R Lejeune, DO   divalproex (Depakote ER) 500 mg 24 hr tablet Take 1 tablet (500 mg) by mouth 2 times a day. 12/24/20   Historical Provider, MD   EPINEPHrine 0.3 " mg/0.3 mL injection syringe INJECT INTRAMUSCULARLY ONCE AS NEEDED FOR ANAPHYLAXIS 5/17/24   Isidoro Mensah DO   ezetimibe (Zetia) 10 mg tablet Take 1 tablet (10 mg) by mouth once daily. 4/22/24 4/22/25  Marah Felix MD   fluticasone (Flonase) 50 mcg/actuation nasal spray USE 1 SPRAY INTO EACH NOSTRIL ONCE DAILY. 6/24/24   Isidoro Mensah DO   folic acid (Folvite) 1 mg tablet Take 1 tablet (1 mg) by mouth once daily. 1/9/24   Isidoro Mensah DO   furosemide (Lasix) 20 mg tablet Take 1 tablet (20 mg) by mouth 2 times a day. 1/9/24   Isidoro Mensah DO   glucagon (Baqsimi) 3 mg/actuation spray,non-aerosol USE ONE SPRAY IN THE NOSE AS NEEDED FOR LOW BLOOD SUGAR  MAY REPEAT AFTER 15 MINUTES USING A NEW DEVICE IF NO RESPONSE 4/2/24   Marah Felix MD   hydrocortisone (Cortef) 10 mg tablet TAKE ONE TABLET BY MOUTH TWO TIMES A DAY; TAKE DOUBLE DOSES FOR 3 DAYS WHEN ILL  Patient taking differently: Take 1 tablet (10 mg) by mouth once daily. 5/17/24   Marah Felix MD   immune globulin, human, (Gammagard) infusion  7/7/23   Historical Provider, MD   insulin glargine (T[place]o Max Solostar- 2 unit dial) 300 unit/mL (3 mL) injection Inject 50 units under skin once daily 5/2/24   Marah Felix MD   insulin lispro (HumaLOG KwikPen Insulin) 100 unit/mL injection Use as directed to give up to 100 units a day 8/14/24   Marah Felix MD   insulin pump cart,automated,BT (Omnipod 5 G6 Pods, Gen 5,) cartridge Inject 1 Device under the skin every other day. 7/15/24   Marah Felix MD   insulin pump cart,cont inf,BT (Omnipod Dash Pods, Gen 4,) cartridge Inject under the skin.    Historical Provider, MD   ipratropium-albuteroL (Duo-Neb) 0.5-2.5 mg/3 mL nebulizer solution Inhale 3 mL 4 times a day as needed.    Historical Provider, MD   lacosamide (Vimpat) 200 mg tablet tablet Take 1 tablet (200 mg) by mouth 2 times a day. 3/28/22   Historical Provider, MD   lacosamide (Vimpat) 50 mg tablet Take  1 tablet (50 mg) by mouth every 12 hours. 6/23/23   Historical Provider, MD   lasmiditan 100 mg tablet Take 100 mg by mouth if needed (migraine). Do not drive in 8 ours of taking this medicine. 7/18/24 7/18/25  Evelyn Bai MD   levETIRAcetam (Keppra) 750 mg tablet Take 2 tablets (1,500 mg) by mouth 2 times a day.    Historical Provider, MD   levothyroxine (Synthroid, Levoxyl) 50 mcg tablet Take 1.5 tablets (75 mcg) by mouth once daily in the morning. Take before meals. 3/14/24   Marah Felix MD   montelukast (Singulair) 10 mg tablet Take 1 tablet (10 mg) by mouth once daily at bedtime. 7/5/24   Kristina Shanks, APRN-CNP   Motegrity 2 mg tablet Take 1 tablet (2 mg) by mouth once daily. 5/9/22   Historical Provider, MD   mupirocin (Bactroban) 2 % ointment apply a small amount to the affected area topically three times a day. 7/11/24   Historical Provider, MD   naratriptan (Amerge) 1 mg tablet Take 1 tablet (1 mg) by mouth 1 time if needed for migraine (may repeat x1). May repeat in 4 hours if unresolved. Do not exceed 5 mg in 24 hours. 8/6/24   Evelyn Bai MD   nasal spray Nayzilam 5 mg/spray (0.1 mL) spray,non-aerosol Administer into affected nostril(s). 11/16/22   Historical Provider, MD   OneTouch Delica Plus Lancet 33 gauge misc USE 1 LANCET TO CHECK GLUCOSE ONCE DAILY AS DIRECTED 1/30/23   Historical Provider, MD   OneTouch Verio test strips strip USE 1 STRIP TO CHECK GLUCOSE ONCE DAILY AS DIRECTED    Historical Provider, MD   pantoprazole (ProtoNix) 40 mg EC tablet Take 1 tablet (40 mg) by mouth once daily. Do not crush, chew, or split.  Patient taking differently: Take 1 tablet (40 mg) by mouth 2 times a day. Do not crush, chew, or split. 1/5/24 7/3/24  Isidoro Mensah,    promethazine (Phenergan) 12.5 mg tablet Take 1 tablet (12.5 mg) by mouth if needed. 8/18/23   Historical Provider, MD   propranolol LA (Inderal LA) 60 mg 24 hr capsule Take 1 capsule (60 mg) by mouth once daily. Do  "not crush, chew, or split. 6/18/24 6/18/25  Isidoro Mensah,    rimegepant (Nurtec ODT) 75 mg tablet,disintegrating Take 1 tablet (75 mg) by mouth if needed (migraine). 7/19/24   Evelyn Bai MD   rOPINIRole (Requip) 0.5 mg tablet Take 1 tablet (0.5 mg) by mouth once daily in the morning. 1 tab in AM and 2 tabs in PM    Historical Provider, MD   senna 8.6 mg tablet Take 1-2 tablets (8.6-17.2 mg) by mouth as needed at bedtime for constipation. 7/31/23   Historical Provider, MD   SUMAtriptan (Imitrex) 25 mg tablet Take 1 tablet (25 mg) by mouth 1 time if needed for migraine. May repeat dose once in 2 hours if no relief.  Do not exceed 2 doses in 24 hours. 8/6/24   Evelyn Bai MD   tiZANidine (Zanaflex) 2 mg tablet Take 1 tablet (2 mg) by mouth every 8 hours if needed for muscle spasms. 2/16/24 3/17/24  Isidoro Mensah DO   ZINC ORAL Take 100 mg by mouth once daily. Take as directed    Historical Provider, MD   ubrogepant (Ubrelvy) 100 mg tablet tablet TAKE ONE (1) TABLET AT ONSET OF MIGRAINE, MAY REPEAT IN 2 HOURS. MAX DOSE 200MG (2 TABLETS) IN 24 HOURS. 3/15/24 6/24/24  Evelyn Bai MD     REVIEW OF SYSTEMS       Review of Systems   Constitutional:  Negative for chills and fever.   HENT: Negative.     Respiratory:  Negative for cough and shortness of breath.    Cardiovascular:  Negative for chest pain.   Gastrointestinal:  Negative for abdominal pain, diarrhea, nausea and vomiting.   Genitourinary: Negative.    Musculoskeletal:  Negative for back pain and neck pain.   Neurological:  Positive for weakness (Subjective weakness to RUE, RLE) and headaches (Right temporal). Negative for dizziness and numbness.       PHYSICAL EXAM      INITIAL VITALS:   /56   Pulse 95   Temp 36.5 °C (97.7 °F)   Resp 16   Ht 1.575 m (5' 2\")   Wt 71.7 kg (158 lb)   SpO2 98%   BMI 28.90 kg/m²     Physical Exam  Vitals reviewed.   HENT:      Head: Normocephalic and atraumatic.      Comments: No facial droop     " Mouth/Throat:      Pharynx: Oropharynx is clear.   Eyes:      Extraocular Movements: Extraocular movements intact.      Pupils: Pupils are equal, round, and reactive to light.   Cardiovascular:      Rate and Rhythm: Regular rhythm. Tachycardia present.   Pulmonary:      Effort: Pulmonary effort is normal.      Breath sounds: Normal breath sounds.   Abdominal:      Palpations: Abdomen is soft.      Tenderness: There is no abdominal tenderness. There is no guarding or rebound.   Musculoskeletal:         General: Normal range of motion.      Cervical back: Normal range of motion.      Comments: Strength 5/5 in bilateral upper and lower extremities   Skin:     Capillary Refill: Capillary refill takes less than 2 seconds.   Neurological:      General: No focal deficit present.      Mental Status: She is alert and oriented to person, place, and time.      Sensory: No sensory deficit.      Motor: No weakness.      Comments: NIH 0. No drift in any extremity. Sensation intact throughout.       DDX/DIAGNOSTIC RESULTS / EMERGENCY DEPARTMENT COURSE / MDM     Medical Decision Making  48-year-old with history of complex migraines presents with right-sided temporal headache typical of her migraine symptoms with subjective left-sided weakness.  On examination, patient NIH 0 with no focal neurologic deficits, has full range of motion of bilateral upper and lower extremities with strength 5/5 throughout, no drift on examination.  Answering all questions appropriately.  Vital signs stable on arrival, heart rate 95.  POC glucose within normal limits.  Will obtain lab workup to rule out underlying electrolyte abnormality or infectious etiology, will obtain CT head and CTA to rule out any acute intracranial abnormality.  Low suspicion for CVA given presentation, impression at this time is complex migraine.        FINAL IMPRESSION      1. Complicated migraine          DISPOSITION / PLAN     Signed out to oncoming provider pending imaging  8/28/2024 2:00 AM    PATIENT REFERRED TO:  No follow-up provider specified.    DISCHARGE MEDICATIONS:  New Prescriptions    No medications on file       Maria Del Rosario Villalobos MD  Emergency Medicine Attending Physician    (Please note that portions of this note were completed with a voice recognition program.  Efforts were made to edit the dictations but occasionally words are mis-transcribed.)     Maria Del Rosario Villalobos MD  08/28/24 0212

## 2024-08-28 NOTE — PROGRESS NOTES
This progress note represents a emergency department transition note for signout of care.    Patient care was signed out to me. Please see the previous provider's notes for the full history and physical. Briefly, Jonathan Ayala is 48 y.o. female who Has history of complex migraines in the past who presented to the emergency department for a headache.  Patient has been having a headache today.  Due to his long allergy list, she did use sumatriptan despite having previous adverse reactions.  Headache had worsened afterwards and she began to experience subjective left-sided weakness.  Patient had been signed out pending CT imaging results in addition to reevaluation.  While waiting, I did order headache medication including magnesium, valproic acid, and Zofran due to the patient's extensive allergy list.  The patient's CT imaging was negative for findings.  Patient was reevaluated and felt better.  Headache was currently a 3 out of 10.  She was still having some mild left-sided weakness.  Patient states is consistent with her prior atypical migraine.  She states that it usually takes up to 24 hours for her symptoms to resolve.  There is no physical weakness.  The patient was offered admission but declined, saying that she believes this was her normal typical complex migraines.  She was advised to follow-up with her neurologist.  Return precautions given.  Patient verbalized understanding, agreed to plan, the patient was discharged home    Marino Reynoso DO  Emergency Medicine    Diagnoses as of 08/28/24 0525   Complicated migraine

## 2024-08-29 ENCOUNTER — APPOINTMENT (OUTPATIENT)
Dept: ENDOCRINOLOGY | Facility: CLINIC | Age: 49
End: 2024-08-29
Payer: MEDICAID

## 2024-08-30 DIAGNOSIS — G43.711 CHRONIC MIGRAINE WITHOUT AURA, INTRACTABLE, WITH STATUS MIGRAINOSUS: Primary | ICD-10-CM

## 2024-09-01 VITALS
HEART RATE: 94 BPM | SYSTOLIC BLOOD PRESSURE: 122 MMHG | RESPIRATION RATE: 16 BRPM | WEIGHT: 258 LBS | BODY MASS INDEX: 47.48 KG/M2 | TEMPERATURE: 97.3 F | DIASTOLIC BLOOD PRESSURE: 83 MMHG | HEIGHT: 62 IN | OXYGEN SATURATION: 96 %

## 2024-09-01 PROCEDURE — 99285 EMERGENCY DEPT VISIT HI MDM: CPT

## 2024-09-02 ENCOUNTER — APPOINTMENT (OUTPATIENT)
Dept: RADIOLOGY | Facility: HOSPITAL | Age: 49
End: 2024-09-02
Payer: MEDICAID

## 2024-09-02 ENCOUNTER — HOSPITAL ENCOUNTER (EMERGENCY)
Facility: HOSPITAL | Age: 49
Discharge: HOME | End: 2024-09-02
Payer: MEDICAID

## 2024-09-02 DIAGNOSIS — R42 DIZZINESS: Primary | ICD-10-CM

## 2024-09-02 LAB
ALBUMIN SERPL BCP-MCNC: 3.7 G/DL (ref 3.4–5)
ALP SERPL-CCNC: 61 U/L (ref 33–110)
ALT SERPL W P-5'-P-CCNC: 13 U/L (ref 7–45)
ANION GAP SERPL CALC-SCNC: 10 MMOL/L (ref 10–20)
AST SERPL W P-5'-P-CCNC: 13 U/L (ref 9–39)
BASOPHILS # BLD AUTO: 0.04 X10*3/UL (ref 0–0.1)
BASOPHILS NFR BLD AUTO: 0.9 %
BILIRUB SERPL-MCNC: 0.2 MG/DL (ref 0–1.2)
BUN SERPL-MCNC: 17 MG/DL (ref 6–23)
CALCIUM SERPL-MCNC: 9.1 MG/DL (ref 8.6–10.3)
CARDIAC TROPONIN I PNL SERPL HS: <3 NG/L (ref 0–13)
CARDIAC TROPONIN I PNL SERPL HS: <3 NG/L (ref 0–13)
CHLORIDE SERPL-SCNC: 104 MMOL/L (ref 98–107)
CO2 SERPL-SCNC: 29 MMOL/L (ref 21–32)
CREAT SERPL-MCNC: 0.93 MG/DL (ref 0.5–1.05)
EGFRCR SERPLBLD CKD-EPI 2021: 77 ML/MIN/1.73M*2
EOSINOPHIL # BLD AUTO: 0.15 X10*3/UL (ref 0–0.7)
EOSINOPHIL NFR BLD AUTO: 3.5 %
ERYTHROCYTE [DISTWIDTH] IN BLOOD BY AUTOMATED COUNT: 14.6 % (ref 11.5–14.5)
GLUCOSE BLD MANUAL STRIP-MCNC: 68 MG/DL (ref 74–99)
GLUCOSE SERPL-MCNC: 81 MG/DL (ref 74–99)
HCT VFR BLD AUTO: 35 % (ref 36–46)
HGB BLD-MCNC: 11.1 G/DL (ref 12–16)
IMM GRANULOCYTES # BLD AUTO: 0.01 X10*3/UL (ref 0–0.7)
IMM GRANULOCYTES NFR BLD AUTO: 0.2 % (ref 0–0.9)
LYMPHOCYTES # BLD AUTO: 1.55 X10*3/UL (ref 1.2–4.8)
LYMPHOCYTES NFR BLD AUTO: 36.6 %
MCH RBC QN AUTO: 29.2 PG (ref 26–34)
MCHC RBC AUTO-ENTMCNC: 31.7 G/DL (ref 32–36)
MCV RBC AUTO: 92 FL (ref 80–100)
MONOCYTES # BLD AUTO: 0.37 X10*3/UL (ref 0.1–1)
MONOCYTES NFR BLD AUTO: 8.7 %
NEUTROPHILS # BLD AUTO: 2.12 X10*3/UL (ref 1.2–7.7)
NEUTROPHILS NFR BLD AUTO: 50.1 %
NRBC BLD-RTO: 0 /100 WBCS (ref 0–0)
PLATELET # BLD AUTO: 258 X10*3/UL (ref 150–450)
POTASSIUM SERPL-SCNC: 3.8 MMOL/L (ref 3.5–5.3)
PROT SERPL-MCNC: 7.3 G/DL (ref 6.4–8.2)
RBC # BLD AUTO: 3.8 X10*6/UL (ref 4–5.2)
SARS-COV-2 RNA RESP QL NAA+PROBE: NOT DETECTED
SODIUM SERPL-SCNC: 139 MMOL/L (ref 136–145)
WBC # BLD AUTO: 4.2 X10*3/UL (ref 4.4–11.3)

## 2024-09-02 PROCEDURE — 70450 CT HEAD/BRAIN W/O DYE: CPT | Performed by: RADIOLOGY

## 2024-09-02 PROCEDURE — 71046 X-RAY EXAM CHEST 2 VIEWS: CPT | Performed by: RADIOLOGY

## 2024-09-02 PROCEDURE — 87635 SARS-COV-2 COVID-19 AMP PRB: CPT

## 2024-09-02 PROCEDURE — 84484 ASSAY OF TROPONIN QUANT: CPT

## 2024-09-02 PROCEDURE — 96375 TX/PRO/DX INJ NEW DRUG ADDON: CPT

## 2024-09-02 PROCEDURE — 82947 ASSAY GLUCOSE BLOOD QUANT: CPT | Mod: 59

## 2024-09-02 PROCEDURE — 71046 X-RAY EXAM CHEST 2 VIEWS: CPT

## 2024-09-02 PROCEDURE — 2500000004 HC RX 250 GENERAL PHARMACY W/ HCPCS (ALT 636 FOR OP/ED)

## 2024-09-02 PROCEDURE — 85025 COMPLETE CBC W/AUTO DIFF WBC: CPT

## 2024-09-02 PROCEDURE — 70450 CT HEAD/BRAIN W/O DYE: CPT

## 2024-09-02 PROCEDURE — 96374 THER/PROPH/DIAG INJ IV PUSH: CPT

## 2024-09-02 PROCEDURE — 80053 COMPREHEN METABOLIC PANEL: CPT

## 2024-09-02 PROCEDURE — 96361 HYDRATE IV INFUSION ADD-ON: CPT

## 2024-09-02 PROCEDURE — 93005 ELECTROCARDIOGRAM TRACING: CPT

## 2024-09-02 RX ORDER — SCOLOPAMINE TRANSDERMAL SYSTEM 1 MG/1
1 PATCH, EXTENDED RELEASE TRANSDERMAL ONCE
Status: DISCONTINUED | OUTPATIENT
Start: 2024-09-02 | End: 2024-09-02 | Stop reason: HOSPADM

## 2024-09-02 RX ORDER — HEPARIN 100 UNIT/ML
SYRINGE INTRAVENOUS
Status: COMPLETED
Start: 2024-09-02 | End: 2024-09-02

## 2024-09-02 RX ORDER — HEPARIN 100 UNIT/ML
5 SYRINGE INTRAVENOUS ONCE
Status: COMPLETED | OUTPATIENT
Start: 2024-09-02 | End: 2024-09-02

## 2024-09-02 RX ORDER — ONDANSETRON HYDROCHLORIDE 2 MG/ML
4 INJECTION, SOLUTION INTRAVENOUS ONCE
Status: COMPLETED | OUTPATIENT
Start: 2024-09-02 | End: 2024-09-02

## 2024-09-02 RX ORDER — SCOLOPAMINE TRANSDERMAL SYSTEM 1 MG/1
1 PATCH, EXTENDED RELEASE TRANSDERMAL
Qty: 3 PATCH | Refills: 0 | Status: SHIPPED | OUTPATIENT
Start: 2024-09-02

## 2024-09-02 ASSESSMENT — LIFESTYLE VARIABLES
EVER FELT BAD OR GUILTY ABOUT YOUR DRINKING: NO
TOTAL SCORE: 0
EVER HAD A DRINK FIRST THING IN THE MORNING TO STEADY YOUR NERVES TO GET RID OF A HANGOVER: NO
HAVE PEOPLE ANNOYED YOU BY CRITICIZING YOUR DRINKING: NO
HAVE YOU EVER FELT YOU SHOULD CUT DOWN ON YOUR DRINKING: NO

## 2024-09-02 NOTE — DISCHARGE INSTRUCTIONS
Use scopolamine patch as needed for dizziness.  For the next 3 days of your hydrocortisone dose from 10 mg to 20 mg then after that go back down to the 10 mg dose.  Please follow-up with your primary care doctor soon as possible.  Come back to the ED for any new or worsening symptoms.

## 2024-09-02 NOTE — ED PROVIDER NOTES
"HPI   Chief Complaint   Patient presents with    Fall     Fell on Saturday at [facility], was seen at [facility] in [place]. Pt states she remembers walking and the next thing she was being helped up by grandson, grandson states she didn't hit her head. She also states she has ringing in her ears since Thursday concerned for ear infection. -thinners.     Dizziness     Everything is spinning, nausea        HPI  Patient is a 48-year-old female presents emergency department for fall and dizziness.  She has a past medical history of POTS, Sjogren syndrome, diabetes, PCOS.  Patient states that she fell yesterday at [facility] and does not recall what happened.  States that she woke up on the ground.  The fall was witnessed by family member who states that she did not hit her head.  Was able to pick her self off the ground and walked around without any difficulty.  This morning patient states that she woke up feeling dizzy like she is \"walking a boat\".  She does feel like she is going to pass out.  States that she was recently treated for a right ear infection and completed the course of antibiotics last week.  She states because of this ear infection she has had some decreased sensation in her left arm and left leg which are not worse than normal.  States that she has been hearing ringing in her right ear and the \"ocean\" in her left ear.  She is concerned that her ear infection has returned.  Endorses nausea.  Denies fevers, chills, chest pain, shortness of breath, rhinorrhea, nasal congestion, cough, abdominal pain.      Patient History   Past Medical History:   Diagnosis Date    Abnormal findings on diagnostic imaging of other abdominal regions, including retroperitoneum 10/14/2020    Abnormal CT of the abdomen    Acquired deformity of nose 03/24/2022    Nasal deformity    Acute upper respiratory infection, unspecified 10/16/2019    Acute URI    Allergy status to unspecified drugs, medicaments and biological substances " 05/22/2020    History of drug allergy    Allergy status to unspecified drugs, medicaments and biological substances 11/13/2020    History of adverse drug reaction    Benign intracranial hypertension 01/27/2022    Pseudotumor cerebri    Bipolar disorder, unspecified (Multi)     Bipolar disease, chronic    Breast calcification, right 08/21/2018    Cellulitis of abdominal wall 09/28/2022    Cellulitis of right abdominal wall    Cervicalgia 07/01/2020    Cervicalgia of [id number] region    Chronic maxillary sinusitis 01/04/2022    Chronic maxillary sinusitis    Chronic sialoadenitis 03/16/2020    Chronic sialoadenitis    COVID-19 01/06/2022    COVID-19 with multiple comorbidities    Decreased white blood cell count, unspecified 11/04/2019    Leukopenia    Disease of thyroid gland     Disturbances of salivary secretion 03/16/2020    Xerostomia    Dry eye syndrome of bilateral lacrimal glands 10/07/2022    Dry eyes, bilateral    Encounter for preprocedural cardiovascular examination 02/01/2022    Preoperative cardiovascular examination    Food additives allergy status 06/11/2020    Allergy to food dye    Fracture of nasal bones, initial encounter for closed fracture 03/03/2022    Closed fracture of nasal bone, initial encounter    Granuloma of right orbit 10/07/2021    Inflammatory pseudotumor of right orbit    History of endometrial ablation 11/09/2017    Hyperlipidemia     Hypertension     Hyperthyroidism     Hypoglycemia     Hypothyroidism     Localized swelling, mass and lump, head 03/24/2022    Swollen nose    Major depressive disorder, recurrent, in full remission (CMS-HCC) 10/07/2021    Depression, major, recurrent, in complete remission    Mammary duct ectasia of left breast 08/24/2022    Periductal mastitis of left breast    Migraine     Nipple discharge 08/24/2022    Bloody discharge from left nipple    Ocular pain, right eye 10/07/2022    Pain in right eye    Optic atrophy     Other abnormal and  inconclusive findings on diagnostic imaging of breast 07/06/2020    Other abnormal and inconclusive findings on diagnostic imaging of breast    Other anomalies of pupillary function 05/31/2019    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    Other chest pain 05/18/2020    Chest discomfort    Other conditions influencing health status 08/01/2022    History of cough    Other conditions influencing health status 08/03/2021    Chronic migraine    Other specified disorders of eustachian tube, left ear 11/18/2019    ETD (Eustachian tube dysfunction), left    Other specified disorders of nose and nasal sinuses 03/24/2022    Nasal dryness    Other specified disorders of nose and nasal sinuses 03/24/2022    Nasal crusting    Pelvic and perineal pain 07/06/2020    Pelvic pain    Personal history of other diseases of the circulatory system 04/07/2020    History of sinus tachycardia    Personal history of other diseases of the circulatory system 04/07/2020    History of abnormal electrocardiography    Personal history of other diseases of the circulatory system     History of Raynaud's syndrome    Personal history of other diseases of the digestive system     History of irritable bowel syndrome    Personal history of other diseases of the digestive system 03/02/2020    History of oral pain    Personal history of other diseases of the musculoskeletal system and connective tissue 01/19/2022    History of neck pain    Personal history of other diseases of the musculoskeletal system and connective tissue 03/02/2021    History of scleroderma    Personal history of other diseases of the musculoskeletal system and connective tissue 06/16/2020    History of muscle weakness    Personal history of other diseases of the nervous system and sense organs 11/18/2019    History of hearing loss    Personal history of other diseases of the nervous system and sense organs 09/21/2022    History of partial seizures    Personal history of other  diseases of the respiratory system 04/14/2021    History of asthma    Personal history of other endocrine, nutritional and metabolic disease 02/17/2021    History of diabetes mellitus    Personal history of other mental and behavioral disorders 05/27/2021    History of anxiety    Personal history of other specified conditions 09/07/2022    History of nipple discharge    Personal history of other specified conditions 10/16/2019    History of headache    Personal history of other specified conditions 09/28/2022    History of lump of left breast    Personal history of other specified conditions 09/16/2021    History of persistent cough    Personal history of other specified conditions 02/01/2022    History of palpitations    Personal history of other specified conditions 03/09/2022    History of headache    Personal history of other specified conditions 02/12/2014    History of chest pain    Personal history of other specified conditions 10/27/2021    History of nausea and vomiting    Personal history of other specified conditions 10/16/2019    History of fatigue    Personal history of other specified conditions 02/26/2021    History of orthopnea    Personal history of other specified conditions 02/22/2021    History of shortness of breath    Personal history of urinary calculi     H/O renal calculi    Polycystic ovary syndrome     Postural orthostatic tachycardia syndrome (POTS)     POTS (postural orthostatic tachycardia syndrome)    Rash and other nonspecific skin eruption 03/15/2022    Rash    Repeated falls 06/23/2021    Recurrent falls    Right lower quadrant pain 10/14/2020    Abdominal pain, RLQ (right lower quadrant)    Sjogren syndrome, unspecified (Multi)     History of Sjogren's disease    Sjogren's syndrome (Multi)     Slow transit constipation 07/09/2020    Slow transit constipation    Subarachnoid hemorrhage, traumatic (Multi) 04/19/2023    Thyroid nodule     Traumatic subarachnoid hemorrhage with loss of  consciousness of unspecified duration, subsequent encounter 03/15/2022    Subarachnoid hemorrhage following injury, with loss of consciousness, subsequent encounter    Traumatic subarachnoid hemorrhage without loss of consciousness, subsequent encounter     Subarachnoid hemorrhage following injury, no loss of consciousness, subsequent encounter    Type 2 diabetes mellitus (Multi)     Unspecified disorder of refraction 10/07/2022    Refractive error    Unspecified optic neuritis 11/06/2020    Right optic neuritis    Unspecified optic neuritis 11/06/2020    Optic neuritis, right    Unspecified visual loss 09/25/2019    Vision loss    Venous insufficiency (chronic) (peripheral) 10/18/2021    Chronic venous insufficiency of lower extremity    Viral infection, unspecified 01/11/2022    Nonspecific syndrome suggestive of viral illness    Vitamin D deficiency     Vitamin D deficiency, unspecified 09/28/2022    Vitamin D deficiency     Past Surgical History:   Procedure Laterality Date    APPENDECTOMY  2017    HERNIA REPAIR Right 03/01/2024    with mesh    HYSTERECTOMY  2017    MR HEAD ANGIO WO IV CONTRAST  02/08/2021    MR HEAD ANGIO WO IV CONTRAST 2/8/2021 [facility] CLINICAL LEGACY    MR NECK ANGIO WO IV CONTRAST  02/08/2021    MR NECK ANGIO WO IV CONTRAST 2/8/2021 [facility] CLINICAL LEGACY    OTHER SURGICAL HISTORY  08/22/2019    Carpal tunnel surgery    OTHER SURGICAL HISTORY  08/22/2019    Hysterectomy    OTHER SURGICAL HISTORY  08/22/2019    Venous access port placement    OTHER SURGICAL HISTORY  08/22/2019    Cholecystectomy    OTHER SURGICAL HISTORY  08/22/2019    Appendectomy    OTHER SURGICAL HISTORY  08/22/2019    Pyloroplasty    WISDOM TOOTH EXTRACTION  2004     Family History   Problem Relation Name Age of Onset    Other (Perforated bowel) Mother Radha     Hypertension Mother Radha     Macular degeneration Mother Radha     Hyperlipidemia Father Mac     Hypertension Father Mac     Heart attack Father Mac      Other (S/P CABG) Father Mac     Diabetes Father Mac     Prostate cancer Father Mac     Coronary artery disease Father Mac     Heart failure Father Mac     Stroke Father Mac     Cancer Father Mac     Macular degeneration Father Mac     Retinal detachment Father Mac     Stroke Sister Jazmin     Breast cancer Sister Jazmin     Lupus Sister Jazmin     Ovarian cancer Sister Jazmin     Astigmatism Sister Jazmin     Hyperlipidemia Brother Sanchez     Hypertension Brother Sanchez     Astigmatism Brother Sanchez     Diabetes Father's Sister Linda     Blindness Father's Sister Linda     Thyroid cancer Father's Sister Bekah     Thyroid disease Father's Sister Jessica     Colon cancer Father's Brother ?     Blindness Other Multiple     Melanoma Other      Asthma Other      Other (hay fever) Other      Allergies Other      Cancer Maternal Grandmother Brenda     Cancer Father's Brother Kian      Social History     Tobacco Use    Smoking status: Never    Smokeless tobacco: Never   Vaping Use    Vaping status: Never Used   Substance Use Topics    Alcohol use: Not Currently     Comment: Socially in College    Drug use: Yes     Types: Benzodiazepines       Physical Exam   ED Triage Vitals [09/01/24 2254]   Temperature Heart Rate Respirations BP   36.3 °C (97.3 °F) 94 16 122/83      Pulse Ox Temp Source Heart Rate Source Patient Position   96 % Temporal -- --      BP Location FiO2 (%)     -- --       Physical Exam  General: Resting comfortably in bed. No acute distress. Non-toxic.   Head: Atraumatic, normocephalic.   Eyes: Sclera anicteric.  Extraocular movements intact.  PERRL.  Fatigable horizontal nystagmus.  ENT: Mucous membranes moist.  Bilateral tympanic membranes with no erythema or fluid.  Ear canals with no irritation.    Heart: Regular rate and rhythm.   Lungs: Clear to auscultation bilaterally.  Normal respiratory pattern without conversational dyspnea or respiratory distress.   Abdomen: Soft, non-tender, non-distended, no  guarding or rebound.   Neurologic: Awake and alert, normal speech and mental status. Moves all extremities equally well.  Decreased sensation left upper extremity left lower extremity.  No dysdiadochokinesia.  No truncal ataxia.  Psychiatric: Mood and affect appropriate.   Skin: Warm and dry, no appreciable rash.   Musculoskeletal: No peripheral edema. No signs of DVT.       ED Course & MDM   ED Course as of 09/02/24 0950   Mon Sep 02, 2024   0529 WBC(!): 4.2  Stable [GENA]      ED Course User Index  [GENA] Victoriano Carpenter MD         Diagnoses as of 09/02/24 0950   Dizziness                 No data recorded     [place] Coma Scale Score: 15 (09/01/24 2254 : Reyna Leon RN)                           Medical Decision Making  Patient is a 48 y.o. female who presents to the emergency department with chief complaint of fall and dizziness. History of present illness as above. Chronic conditions impacting care include POTS, Sjogren syndrome, diabetes, PCOS. Patient remained afebrile and hemodynamically stable while in the emergency department. They saturated well on room air. The differential diagnosis associated with this patient's presentation includes electrolyte abnormalities, DEN, anemia, intracranial injury, arrhythmia, dehydration. Our workup consisted of ordering/reviewing CBC, CMP, troponin, glucose, EKG, chest x-ray, CT head.     Patient did endorse dizziness upon my examination.  Exam performed.  Head impulse test did have corrective saccade to midline.  Nystagmus was horizontal and fatigable.  Test of skew with no deviation.  Findings consistent with peripheral vertigo as etiology of her dizziness.  Patient with extensive allergies.  It is noted in her chart that meclizine causes her angioedema.  Scopolamine patch ordered for her dizziness.    External records reviewed:  Care everywhere reviewed for current medications medical history    Diagnostics interpreted by me include:  EKG sinus rhythm with low  voltage precordial leads and old anterior septal infarct.      ED Course as of 09/02/24 0950   Mon Sep 02, 2024   0529 WBC(!): 4.2  Stable [GENA]      ED Course User Index  [GENA] Victoriano Carpenter MD         Diagnoses as of 09/02/24 0950   Dizziness       CMP with no electrolyte abnormalities, DEN, or transaminitis.  Troponin x 2 undetectable. Chest x-ray with cardiomegaly and mild pulmonary vascular congestion.  CT head unremarkable.    It did take some time for the patient scopolamine patch to arrive from pharmacy.  She will need reevaluation once the patch is applied.  If patient's symptoms improved with scopolamine and she is ambulating without difficulty, anticipate discharge home with follow-up with ENT.    Patient was signed out to Dr. Panrell at 0700 pending completion of their work-up.  Please see the next provider's transition of care note for the remainder of the patient's care.       Social determinants of health affecting care:  None    ED Medications managed:  Medications   scopolamine (Transderm-Scop) patch 1 patch (1 patch transdermal Medication Applied 9/2/24 0725)   ondansetron (Zofran) injection 4 mg (4 mg intravenous Given 9/2/24 0240)   sodium chloride 0.9 % bolus 1,000 mL (0 mL intravenous Stopped 9/2/24 0350)   heparin flush 100 unit/mL syringe 500 Units (500 Units intravenous Given 9/2/24 0827)         Procedure  Procedures     Victoriano Carpenter MD  09/02/24 0950

## 2024-09-02 NOTE — PROGRESS NOTES
Emergency Medicine Transition of Care Note.    I received Jonathan Ayala in signout from Dr. Carpenter.  Please see the previous ED provider note for all HPI, PE and MDM up to the time of signout at 0700. This is in addition to the primary record.    In brief Jonathan Ayala is an 48 y.o. female presenting for   Chief Complaint   Patient presents with    Fall     Fell on Saturday at [facility], was seen at [facility] in [place]. Pt states she remembers walking and the next thing she was being helped up by grandson, grandson states she didn't hit her head. She also states she has ringing in her ears since Thursday concerned for ear infection. -thinners.     Dizziness     Everything is spinning, nausea      At the time of signout we were awaiting: re-eval    ED Course as of 09/02/24 0801   Mon Sep 02, 2024   0529 WBC(!): 4.2  Stable [GENA]      ED Course User Index  [GENA] Victoriano Carpenter MD         Diagnoses as of 09/02/24 0801   Dizziness       Medical Decision Making  48-year-old female presented ED with dizziness.  This is a setting of her recent ear infection.  Also is having some whooshing hearing sounds in her left ear and decreased hearing to the ear.  Work up to the time of signout showed no acute abnormalities.  Original provider thought this is likely peripheral vertigo as her HINTS exam was normal.  At signout we are awaiting reeval.  On reevaluation patient dizziness is improved.  She is able to ambulate normally.  Therefore, will discharge patient home with scopolamine patch as needed for dizziness and instructions to increase her hydrocortisone dose to 20 mg for the next 3 days.  Advised to follow-up with primary care and ENT.  Told to come back to the ED for any new or worsening symptoms.    Final diagnoses:   [R42] Dizziness           Procedure  Procedures    Emmanuel Parnell DO

## 2024-09-03 ENCOUNTER — APPOINTMENT (OUTPATIENT)
Dept: CARDIOLOGY | Facility: HOSPITAL | Age: 49
End: 2024-09-03
Payer: MEDICAID

## 2024-09-03 ENCOUNTER — TELEPHONE (OUTPATIENT)
Dept: NEUROLOGY | Facility: HOSPITAL | Age: 49
End: 2024-09-03
Payer: MEDICAID

## 2024-09-03 DIAGNOSIS — G43.711 CHRONIC MIGRAINE WITHOUT AURA, INTRACTABLE, WITH STATUS MIGRAINOSUS: ICD-10-CM

## 2024-09-05 LAB
ATRIAL RATE: 82 BPM
P AXIS: 51 DEGREES
PR INTERVAL: 185 MS
Q ONSET: 252 MS
QRS COUNT: 14 BEATS
QRS DURATION: 99 MS
QT INTERVAL: 398 MS
QTC CALCULATION(BAZETT): 465 MS
QTC FREDERICIA: 441 MS
R AXIS: 2 DEGREES
T AXIS: 75 DEGREES
T OFFSET: 451 MS
VENTRICULAR RATE: 82 BPM

## 2024-09-06 ENCOUNTER — APPOINTMENT (OUTPATIENT)
Dept: ENDOCRINOLOGY | Facility: CLINIC | Age: 49
End: 2024-09-06
Payer: MEDICAID

## 2024-09-06 DIAGNOSIS — E11.65 UNCONTROLLED TYPE 2 DIABETES MELLITUS WITH HYPERGLYCEMIA (MULTI): ICD-10-CM

## 2024-09-06 PROCEDURE — 99211 OFF/OP EST MAY X REQ PHY/QHP: CPT | Performed by: PHARMACIST

## 2024-09-06 NOTE — PROGRESS NOTES
Clinical Pharmacy Team met with Jonathan Ayala regarding a consultation for diabetes management thanks to a referral from Dr. Marah Felix MD. Below is a summary of our conversation and recommendations:    ________________________________________________________________________      Allergies   Allergen Reactions    Acetazolamide Hives, Rash, Shortness of breath and Swelling     oral hives, redness, ABD pain    Atorvastatin Unknown     Causes muscle pain    Cefdinir Itching, Rash, Shortness of breath and Anaphylaxis     Itchy throat    Throat closing / difficulty breathing.  Took Benadryl at home.    Itchy throat      Throat closing / difficulty breathing.  Took Benadryl at home.    Ceftriaxone Anaphylaxis, Rash, Shortness of breath and Swelling     SOB    SOB hives turned red during gallbladder    Doxepin Anaphylaxis, Hives, Swelling, Angioedema and Rash     Itchy sore throat problems with swallowing    facial swelling    Itchy sore throat problems with swallowing      facial swelling    Duloxetine Anaphylaxis, Hallucinations and Rash     suicidal ideation    suicidal ideation     Other reaction(s): suicidal ideation    suicidal    Suicidal ideation    Levofloxacin Angioedema, Swelling and Rash     eyelid swelling    eyelid swelling. Patient tolerates Avelox    Levofloxacin In D5w Anaphylaxis     Moon face lips swelling just Levaquin tolerated D5W)    Nutritional Supplements Anaphylaxis     Grapes ( red dye    Prochlorperazine Anaphylaxis     HIGH Compazine involuntary muscle spasms low doses tolerated)    Red Dye Anaphylaxis    Becky Anaphylaxis and Swelling     Becky    SOB facial swelling    Rosemary Oil Anaphylaxis, Itching and Swelling     Becky  SOB facial swelling      SOB facial swelling    Strawberry Shortness of breath     White blisters in mouth      Other reaction(s): white blisters in mouth, shortness of breath    Strawberry Flavor Anaphylaxis     Allergy to strawberry fruit and juice     "Sulfa (Sulfonamide Antibiotics) Anaphylaxis, Rash, Shortness of breath and Swelling     SOB itchy hives    Other Reaction(s): rash, SOB      SOB itchy hives    Topiramate Anaphylaxis, Swelling and Angioedema     eyelid swelling    Throat swelling    eyelid swelling  Throat swelling      Throat swelling      eyelid swelling    Tree Nuts Shortness of breath     walnuts    Tree Pollen-Black [place] Shortness of breath     Other Reaction(s): Other: See Comments      White blisters in mouth      blisters in mouth-carries an EPIPEN-\"I have gotten short of breath\"    Tree Pollen-Pecan Shortness of breath     pecans    [id number]-[id number] Antimigraine Agents Anaphylaxis and Nausea Only     \"[id number]-2-[id number]- anti migraine agents and DHE: can cause stroke per pt \"    None due to Raynaud's  ( Triptans cause a stroke)    [place] Shortness of breath    Aripiprazole Unknown, Fever and Other     fever and tremors    Fevers and Tremors    Tremor    Aspartame Diarrhea     Blood sugar [place], Diarrhea    Aspartame (Bulk) Diarrhea, Nausea Only and Nausea/vomiting     Other Reaction(s): nausea, diarrhea, abdominal pain      Blood sugar [place], Diarrhea    Fenofibrate Other     Double vision    Gluten Itching, Other and Rash     Rashes constipation gastric discomfort    Hydrochlorothiazide Hives, Itching and Rash     hctz removed as free-text allergy and entered as [facility] allergy,1455 10/6/2007JO      itchy hives    Hydromorphone Unknown, GI intolerance and Nausea Only     Intolerance nausea instant    Iron Hives and Rash     ichy hive ( can tolerate ferrous gluconate)    Meclizine Angioedema, Other and Swelling     eyelid swelling    Swelling of the eyelids    Eyelids and face    Metformin Other     Other reaction(s): Dyspepsia, Dyspepsia    Propoxyphene Unknown and Hives     Darvocet itchy hives    Statins-Hmg-Coa Reductase Inhibitors Unknown     Double vision    Tetracyclines Hives, Itching and Rash     sulfa    Rash itching    Itchy  rash    " Thiazides Hives, Itching and Rash     itchy hives    Venlafaxine Unknown and Fever     Fevers chills    Wheat Unknown     oral blisters    Adhesive Tape-Silicones Itching and Other    Barbiturates Unknown    Dhe Unknown     Raynaud's disease    Diet Foods Diarrhea     Aspartame allergy only. Removes to many foods to interface.    Ferrous Sulfate Hives    Nsaids (Non-Steroidal Anti-Inflammatory Drug) Unknown and Nausea/vomiting    Other Unknown    Sulfonylureas Unknown    Tobramycin Unknown    Vancomycin Unknown and Hives     Niyah syndrome    Other reaction(s): Other    Red man syndrome if given fast, benadryl with.     Niyah syndrome  Other reaction(s): Other      Other reaction(s): Other      Red man syndrome if given fast, benadryl with.    Adhesive Rash    Azithromycin Hives, Itching and Rash    Betamethasone Nausea And Vomiting, Other, GI intolerance and Diarrhea     N/V/D    House Dust Rash    Metoclopramide Hcl Other    Milk Unknown, Itching and Rash     ABD pain    Prednisone Rash    Propoxyphene-Acetaminophen Hives and Rash    Sulfacetamide Sodium Rash and Swelling    Zolpidem Other and Hallucinations     Sleep walk    Other Reaction(s): insomnia and agitation      Sleep walk         Diabetes Pharmacotherapy:   Toujeo 44 units subcutaneous once daily  Humalog with an ICR of 1:3 and ISF of 1:30  Farxiga 10 mg once daily    Patient reports tolerating this regimen well.      Lab Review  Lab Results   Component Value Date    BILITOT 0.3 08/06/2023    CALCIUM 9.5 08/06/2023    CO2 23 08/06/2023     08/06/2023    CREATININE 0.82 08/06/2023    GLUCOSE 135 (H) 08/06/2023    ALKPHOS 81 08/06/2023    K 4.2 08/06/2023    PROT 8.8 (H) 08/06/2023     08/06/2023    AST 21 08/06/2023    ALT 27 08/06/2023    BUN 17 08/06/2023    ANIONGAP 16 08/06/2023    MG 2.16 07/28/2023    PHOS 3.8 04/30/2023    LDH 95 07/17/2020    ALBUMIN 4.3 08/06/2023    LIPASE 20 04/30/2023    GFRF 89 08/06/2023    GFRMALE CANCELED  2023     Lab Results   Component Value Date    TRIG CANCELED 2023    CHOL CANCELED 2023    HDL CANCELED 2023     Lab Results   Component Value Date    HGBA1C 5.9 (H) 2023    HGBA1C 7.8 (A) 2023    HGBA1C 8.3 (A) 2022     The 10-year ASCVD risk score (Mamie SANDERSON, et al., 2019) is: 3.4%    Values used to calculate the score:      Age: 48 years      Sex: Female      Is Non- : No      Diabetic: Yes      Tobacco smoker: No      Systolic Blood Pressure: 146 mmHg      Is BP treated: Yes      HDL Cholesterol: 46.3 mg/dL      Total Cholesterol: 186 mg/dL          Assessment/Plan     The patient reports today for a diabetes consultation. Reviewed CGM report and discussed it with the patient. She has not yet switched to the omnipod 5 due to time conflicts with the . Patient will come next week for onsite training which she    PATIENT EDUCATION/GOALS    Goals  Fasting B - 130 mg/dL  Postprandial BG: less than 180 mg/dL  A1c: less than 7%    Type of encounter: [virtual]    Provided counseling on lifestyle modifications, medications, and self-monitoring. Patient has no additional questions at this time. Pharmacy to follow up in 2 weeks. Please reach out with any questions. Thank you.       Joanna Evans, PharmD    Provider on site: Jazz Mccall CNP  Continue all meds under the continuation of care with the referring provider and clinical pharmacy

## 2024-09-09 ENCOUNTER — APPOINTMENT (OUTPATIENT)
Dept: ENDOCRINOLOGY | Facility: CLINIC | Age: 49
End: 2024-09-09
Payer: MEDICAID

## 2024-09-09 DIAGNOSIS — E11.65 UNCONTROLLED TYPE 2 DIABETES MELLITUS WITH HYPERGLYCEMIA (MULTI): ICD-10-CM

## 2024-09-09 PROCEDURE — 99211 OFF/OP EST MAY X REQ PHY/QHP: CPT | Performed by: PHARMACIST

## 2024-09-09 NOTE — PROGRESS NOTES
Clinical Pharmacy Team met with Jonathan Ayala regarding a consultation for diabetes management thanks to a referral from Dr. Marah Felix MD. Below is a summary of our conversation and recommendations:    ________________________________________________________________________      Allergies   Allergen Reactions    Acetazolamide Hives, Rash, Shortness of breath and Swelling     oral hives, redness, ABD pain    Atorvastatin Unknown     Causes muscle pain    Cefdinir Itching, Rash, Shortness of breath and Anaphylaxis     Itchy throat    Throat closing / difficulty breathing.  Took Benadryl at home.    Itchy throat      Throat closing / difficulty breathing.  Took Benadryl at home.    Ceftriaxone Anaphylaxis, Rash, Shortness of breath and Swelling     SOB    SOB hives turned red during gallbladder    Doxepin Anaphylaxis, Hives, Swelling, Angioedema and Rash     Itchy sore throat problems with swallowing    facial swelling    Itchy sore throat problems with swallowing      facial swelling    Duloxetine Anaphylaxis, Hallucinations and Rash     suicidal ideation    suicidal ideation     Other reaction(s): suicidal ideation    suicidal    Suicidal ideation    Levofloxacin Angioedema, Swelling and Rash     eyelid swelling    eyelid swelling. Patient tolerates Avelox    Levofloxacin In D5w Anaphylaxis     Moon face lips swelling just Levaquin tolerated D5W)    Nutritional Supplements Anaphylaxis     Grapes ( red dye    Prochlorperazine Anaphylaxis     HIGH Compazine involuntary muscle spasms low doses tolerated)    Red Dye Anaphylaxis    Becky Anaphylaxis and Swelling     Becky    SOB facial swelling    Rosemary Oil Anaphylaxis, Itching and Swelling     Becky  SOB facial swelling      SOB facial swelling    Strawberry Shortness of breath     White blisters in mouth      Other reaction(s): white blisters in mouth, shortness of breath    Strawberry Flavor Anaphylaxis     Allergy to strawberry fruit and juice     "Sulfa (Sulfonamide Antibiotics) Anaphylaxis, Rash, Shortness of breath and Swelling     SOB itchy hives    Other Reaction(s): rash, SOB      SOB itchy hives    Topiramate Anaphylaxis, Swelling and Angioedema     eyelid swelling    Throat swelling    eyelid swelling  Throat swelling      Throat swelling      eyelid swelling    Tree Nuts Shortness of breath     walnuts    Tree Pollen-Black [place] Shortness of breath     Other Reaction(s): Other: See Comments      White blisters in mouth      blisters in mouth-carries an EPIPEN-\"I have gotten short of breath\"    Tree Pollen-Pecan Shortness of breath     pecans    [id number] Antimigraine Agents Anaphylaxis and Nausea Only     \"Atdrylmwm-2-xm3- anti migraine agents and DHE: can cause stroke per pt \"    None due to Raynaud's  ( Triptans cause a stroke)    [place] Shortness of breath    Aripiprazole Unknown, Fever and Other     fever and tremors    Fevers and Tremors    Tremor    Aspartame Diarrhea     Blood sugar [place], Diarrhea    Aspartame (Bulk) Diarrhea, Nausea Only and Nausea/vomiting     Other Reaction(s): nausea, diarrhea, abdominal pain      Blood sugar [place], Diarrhea    Fenofibrate Other     Double vision    Gluten Itching, Other and Rash     Rashes constipation gastric discomfort    Hydrochlorothiazide Hives, Itching and Rash     hctz removed as free-text allergy and entered as [facility] allergy,1455 10/6/2007JO      itchy hives    Hydromorphone Unknown, GI intolerance and Nausea Only     Intolerance nausea instant    Iron Hives and Rash     ichy hive ( can tolerate ferrous gluconate)    Meclizine Angioedema, Other and Swelling     eyelid swelling    Swelling of the eyelids    Eyelids and face    Metformin Other     Other reaction(s): Dyspepsia, Dyspepsia    Propoxyphene Unknown and Hives     Darvocet itchy hives    Statins-Hmg-Coa Reductase Inhibitors Unknown     Double vision    Tetracyclines Hives, Itching and Rash     sulfa    Rash itching    Itchy  rash    " Thiazides Hives, Itching and Rash     itchy hives    Venlafaxine Unknown and Fever     Fevers chills    Wheat Unknown     oral blisters    Adhesive Tape-Silicones Itching and Other    Barbiturates Unknown    Dhe Unknown     Raynaud's disease    Diet Foods Diarrhea     Aspartame allergy only. Removes to many foods to interface.    Ferrous Sulfate Hives    Nsaids (Non-Steroidal Anti-Inflammatory Drug) Unknown and Nausea/vomiting    Other Unknown    Sulfonylureas Unknown    Tobramycin Unknown    Vancomycin Unknown and Hives     Niyah syndrome    Other reaction(s): Other    Red man syndrome if given fast, benadryl with.     Niyah syndrome  Other reaction(s): Other      Other reaction(s): Other      Red man syndrome if given fast, benadryl with.    Adhesive Rash    Azithromycin Hives, Itching and Rash    Betamethasone Nausea And Vomiting, Other, GI intolerance and Diarrhea     N/V/D    House Dust Rash    Metoclopramide Hcl Other    Milk Unknown, Itching and Rash     ABD pain    Prednisone Rash    Propoxyphene-Acetaminophen Hives and Rash    Sulfacetamide Sodium Rash and Swelling    Zolpidem Other and Hallucinations     Sleep walk    Other Reaction(s): insomnia and agitation      Sleep walk         Diabetes Pharmacotherapy:   Toujeo 44 units subcutaneous once daily  Humalog with an ICR of 1:3 and ISF of 1:30    Patient reports tolerating this regimen well.      Lab Review  Lab Results   Component Value Date    BILITOT 0.3 08/06/2023    CALCIUM 9.5 08/06/2023    CO2 23 08/06/2023     08/06/2023    CREATININE 0.82 08/06/2023    GLUCOSE 135 (H) 08/06/2023    ALKPHOS 81 08/06/2023    K 4.2 08/06/2023    PROT 8.8 (H) 08/06/2023     08/06/2023    AST 21 08/06/2023    ALT 27 08/06/2023    BUN 17 08/06/2023    ANIONGAP 16 08/06/2023    MG 2.16 07/28/2023    PHOS 3.8 04/30/2023    LDH 95 07/17/2020    ALBUMIN 4.3 08/06/2023    LIPASE 20 04/30/2023    GFRF 89 08/06/2023    GFRMALE CANCELED 04/30/2023     Lab Results    Component Value Date    TRIG CANCELED 2023    CHOL CANCELED 2023    HDL CANCELED 2023     Lab Results   Component Value Date    HGBA1C 5.9 (H) 2023    HGBA1C 7.8 (A) 2023    HGBA1C 8.3 (A) 2022     The 10-year ASCVD risk score (Mamie SANDERSON, et al., 2019) is: 3.4%    Values used to calculate the score:      Age: 48 years      Sex: Female      Is Non- : No      Diabetic: Yes      Tobacco smoker: No      Systolic Blood Pressure: 146 mmHg      Is BP treated: Yes      HDL Cholesterol: 46.3 mg/dL      Total Cholesterol: 186 mg/dL          Assessment/Plan     The patient reports today for a diabetes consultation. Reviewed CGM report and discussed it with the patient. Today we did an omnipod 5 pump training. Discussed major training points. Reviewed points listed in omnipod 5 training check list. Answered all patient questions. Patient was able to successfully self apply her omnipod 5 with dexcom g6. Will follow up with patient.       PATIENT EDUCATION/GOALS    Goals  Fasting B - 130 mg/dL  Postprandial BG: less than 180 mg/dL  A1c: less than 7%    Type of encounter: [virtual]    Provided counseling on lifestyle modifications, medications, and self-monitoring. Patient has no additional questions at this time. Patient has scheduled endo visit in 1 month. Please reach out with any questions. Thank you.       Joanna Evans, PharmD    Provider on site: Jazz Mccall CNP  Continue all meds under the continuation of care with the referring provider and clinical pharmacy

## 2024-09-10 ENCOUNTER — HOSPITAL ENCOUNTER (OUTPATIENT)
Facility: HOSPITAL | Age: 49
Discharge: HOME | End: 2024-09-10
Payer: MEDICAID

## 2024-09-10 ENCOUNTER — APPOINTMENT (OUTPATIENT)
Dept: CARDIOLOGY | Facility: HOSPITAL | Age: 49
End: 2024-09-10
Payer: MEDICAID

## 2024-09-10 ENCOUNTER — PATIENT MESSAGE (OUTPATIENT)
Dept: ENDOCRINOLOGY | Facility: CLINIC | Age: 49
End: 2024-09-10
Payer: MEDICAID

## 2024-09-10 VITALS
SYSTOLIC BLOOD PRESSURE: 147 MMHG | RESPIRATION RATE: 16 BRPM | HEART RATE: 105 BPM | TEMPERATURE: 96.8 F | DIASTOLIC BLOOD PRESSURE: 90 MMHG | WEIGHT: 250 LBS | OXYGEN SATURATION: 100 % | BODY MASS INDEX: 46.01 KG/M2 | HEIGHT: 62 IN

## 2024-09-10 DIAGNOSIS — E11.65 UNCONTROLLED TYPE 2 DIABETES MELLITUS WITH HYPERGLYCEMIA (MULTI): ICD-10-CM

## 2024-09-10 DIAGNOSIS — M46.1 BILATERAL SACROILIITIS (CMS-HCC): ICD-10-CM

## 2024-09-10 RX ORDER — BLOOD-GLUCOSE SENSOR
EACH MISCELLANEOUS
Qty: 3 EACH | Refills: 11 | Status: SHIPPED | OUTPATIENT
Start: 2024-09-10

## 2024-09-10 RX ORDER — BLOOD-GLUCOSE TRANSMITTER
EACH MISCELLANEOUS
Qty: 1 EACH | Refills: 3 | Status: SHIPPED | OUTPATIENT
Start: 2024-09-10

## 2024-09-10 RX ORDER — LIDOCAINE HYDROCHLORIDE 5 MG/ML
INJECTION, SOLUTION INFILTRATION; INTRAVENOUS
Status: DISCONTINUED
Start: 2024-09-10 | End: 2024-09-10 | Stop reason: WASHOUT

## 2024-09-10 RX ORDER — METHYLPREDNISOLONE ACETATE 40 MG/ML
INJECTION, SUSPENSION INTRA-ARTICULAR; INTRALESIONAL; INTRAMUSCULAR; SOFT TISSUE
Status: DISCONTINUED
Start: 2024-09-10 | End: 2024-09-10 | Stop reason: WASHOUT

## 2024-09-10 NOTE — H&P
Pain Management H&P    History Of Present Illness  Jonathan Ayala is a 48 y.o. female presents for procedure state below. Endorses no changes in past medical history or medical health since last seen in clinic.      Past Medical History  She has a past medical history of Abnormal findings on diagnostic imaging of other abdominal regions, including retroperitoneum (10/14/2020), Acquired deformity of nose (03/24/2022), Acute upper respiratory infection, unspecified (10/16/2019), Allergy status to unspecified drugs, medicaments and biological substances (05/22/2020), Allergy status to unspecified drugs, medicaments and biological substances (11/13/2020), Benign intracranial hypertension (01/27/2022), Bipolar disorder, unspecified (Multi), Breast calcification, right (08/21/2018), Cellulitis of abdominal wall (09/28/2022), Cervicalgia (07/01/2020), Chronic maxillary sinusitis (01/04/2022), Chronic sialoadenitis (03/16/2020), COVID-19 (01/06/2022), Decreased white blood cell count, unspecified (11/04/2019), Disease of thyroid gland, Disturbances of salivary secretion (03/16/2020), Dry eye syndrome of bilateral lacrimal glands (10/07/2022), Encounter for preprocedural cardiovascular examination (02/01/2022), Food additives allergy status (06/11/2020), Fracture of nasal bones, initial encounter for closed fracture (03/03/2022), Granuloma of right orbit (10/07/2021), History of endometrial ablation (11/09/2017), Hyperlipidemia, Hypertension, Hyperthyroidism, Hypoglycemia, Hypothyroidism, Localized swelling, mass and lump, head (03/24/2022), Major depressive disorder, recurrent, in full remission (CMS-HCC) (10/07/2021), 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), Migraine, Nipple discharge (08/24/2022), Ocular pain, right eye (10/07/2022), Optic atrophy, Other abnormal and inconclusive findings on diagnostic imaging of breast (07/06/2020), Other anomalies of pupillary function (05/31/2019), Other chest pain  (05/18/2020), Other conditions influencing health status (08/01/2022), Other conditions influencing health status (08/03/2021), Other specified disorders of eustachian tube, left ear (11/18/2019), Other specified disorders of nose and nasal sinuses (03/24/2022), Other specified disorders of nose and nasal sinuses (03/24/2022), Pelvic and perineal pain (07/06/2020), Personal history of other diseases of the circulatory system (04/07/2020), Personal history of other diseases of the circulatory system (04/07/2020), Personal history of other diseases of the circulatory system, Personal history of other diseases of the digestive system, Personal history of other diseases of the digestive system (03/02/2020), Personal history of other diseases of the musculoskeletal system and connective tissue (01/19/2022), Personal history of other diseases of the musculoskeletal system and connective tissue (03/02/2021), Personal history of other diseases of the musculoskeletal system and connective tissue (06/16/2020), Personal history of other diseases of the nervous system and sense organs (11/18/2019), Personal history of other diseases of the nervous system and sense organs (09/21/2022), Personal history of other diseases of the respiratory system (04/14/2021), Personal history of other endocrine, nutritional and metabolic disease (02/17/2021), Personal history of other mental and behavioral disorders (05/27/2021), Personal history of other specified conditions (09/07/2022), Personal history of other specified conditions (10/16/2019), Personal history of other specified conditions (09/28/2022), Personal history of other specified conditions (09/16/2021), Personal history of other specified conditions (02/01/2022), Personal history of other specified conditions (03/09/2022), Personal history of other specified conditions (02/12/2014), Personal history of other specified conditions (10/27/2021), Personal history of other specified  conditions (10/16/2019), Personal history of other specified conditions (02/26/2021), Personal history of other specified conditions (02/22/2021), Personal history of urinary calculi, Polycystic ovary syndrome, Postural orthostatic tachycardia syndrome (POTS), Rash and other nonspecific skin eruption (03/15/2022), Repeated falls (06/23/2021), Right lower quadrant pain (10/14/2020), Sjogren syndrome, unspecified (Multi), Sjogren's syndrome (Multi), Slow transit constipation (07/09/2020), Subarachnoid hemorrhage, traumatic (Multi) (04/19/2023), Thyroid nodule, Traumatic subarachnoid hemorrhage with loss of consciousness of unspecified duration, subsequent encounter (03/15/2022), Traumatic subarachnoid hemorrhage without loss of consciousness, subsequent encounter, Type 2 diabetes mellitus (Multi), Unspecified disorder of refraction (10/07/2022), Unspecified optic neuritis (11/06/2020), Unspecified optic neuritis (11/06/2020), Unspecified visual loss (09/25/2019), Venous insufficiency (chronic) (peripheral) (10/18/2021), Viral infection, unspecified (01/11/2022), Vitamin D deficiency, and Vitamin D deficiency, unspecified (09/28/2022).    Surgical History  She has a past surgical history that includes Other surgical history (08/22/2019); Other surgical history (08/22/2019); Other surgical history (08/22/2019); Other surgical history (08/22/2019); Other surgical history (08/22/2019); Other surgical history (08/22/2019); MR angio neck wo IV contrast (02/08/2021); MR angio head wo IV contrast (02/08/2021); [facility] tooth extraction (2004); Appendectomy (2017); Hysterectomy (2017); and Hernia repair (Right, 03/01/2024).     Social History  She reports that she has never smoked. She has never used smokeless tobacco. She reports that she does not currently use alcohol. She reports current drug use. Drug: Benzodiazepines.    Family History  Family History   Problem Relation Name Age of Onset    Other (Perforated bowel) Mother  Radha     Hypertension Mother Radha     Macular degeneration Mother Radha     Hyperlipidemia Father Mac     Hypertension Father Mac     Heart attack Father Mac     Other (S/P CABG) Father Mac     Diabetes Father Mac     Prostate cancer Father Mac     Coronary artery disease Father Mac     Heart failure Father Mac     Stroke Father Mac     Cancer Father Mac     Macular degeneration Father Mac     Retinal detachment Father Mac     Stroke Sister Jazmin     Breast cancer Sister Jazmin     Lupus Sister Jazmin     Ovarian cancer Sister Jazmin     Astigmatism Sister Jazmin     Hyperlipidemia Brother Sanchez     Hypertension Brother Sanchez     Astigmatism Brother Sanchez     Diabetes Father's Sister Linda     Blindness Father's Sister Linda     Thyroid cancer Father's Sister Bekah     Thyroid disease Father's Sister Jessica     Colon cancer Father's Brother ?     Blindness Other Multiple     Melanoma Other      Asthma Other      Other (hay fever) Other      Allergies Other      Cancer Maternal Grandmother Brenda     Cancer Father's Brother Kian         Allergies  Acetazolamide, Atorvastatin, Cefdinir, Ceftriaxone, Doxepin, Duloxetine, Levofloxacin, Levofloxacin in d5w, Nutritional supplements, Ozempic [semaglutide], Prochlorperazine, Red dye, Rosemary, Rosemary oil, Strawberry, Sulfa (sulfonamide antibiotics), Topiramate, Tree pollen-black walnut, Tree pollen-pecan, [place], Aripiprazole, Aspartame, Aspartame (bulk), Fenofibrate, Gluten, Hydrochlorothiazide, Hydromorphone, Iron, Meclizine, Metformin, Propoxyphene, Statins-hmg-coa reductase inhibitors, Tetracyclines, Thiazides, Venlafaxine, Wheat, Adhesive tape-silicones, Barbiturates, Ciprofloxacin, Dhe, Diet foods, Farxiga [dapagliflozin], Ferrous sulfate, Nsaids (non-steroidal anti-inflammatory drug), Other, Sulfonylureas, Tobramycin, Vancomycin, Adhesive, Azithromycin, Betamethasone, House dust, Metoclopramide hcl, Milk, Prednisone, Propoxyphene-acetaminophen,  "Sulfacetamide sodium, [id number] antimigraine agents, and Zolpidem    Review of Symptoms:   Constitutional: Negative for chills, diaphoresis or fever  HENT: Negative for neck swelling  Eyes:.  Negative for eye pain  Respiratory:.  Negative for cough, shortness of breath or wheezing    Cardiovascular:.  Negative for chest pain or palpitations  Gastrointestinal:.  Negative for abdominal pain, nausea and vomiting  Genitourinary:.  Negative for urgency  Musculoskeletal:  Positive for back pain. Positive for joint pain. Denies falls within the past 3 months.  Skin: Negative for wounds or itching   Neurological: Negative for dizziness, seizures, loss of consciousness and weakness  Endo/Heme/Allergies: Does not bruise/bleed easily  Psychiatric/Behavioral: Negative for depression. The patient does not appear anxious.       PHYSICAL EXAM  Vitals signs reviewed  Constitutional:       General: Not in acute distress     Appearance: Normal appearance. Not ill-appearing.  HENT:     Head: Normocephalic and atraumatic  Eyes:     Conjunctiva/sclera: Conjunctivae normal  Cardiovascular:     Rate and Rhythm: Normal rate and regular rhythm  Pulmonary:     Effort: No respiratory distress  Abdominal:     Palpations: Abdomen is soft  Musculoskeletal: DORMAN  Skin:     General: Skin is warm and dry  Neurological:     General: No focal deficit present  Psychiatric:         Mood and Affect: Mood normal         Behavior: Behavior normal     Last Recorded Vitals  There were no vitals taken for this visit.    Relevant Results  Current Outpatient Medications   Medication Instructions    Advair -21 mcg/actuation inhaler INHALE TWO PUFFS BY MOUTH AS INSTRUCTED TWO TIMES A DAY.    amitriptyline (ELAVIL) 100 mg, oral, Nightly    amLODIPine (NORVASC) 5 mg, oral, Daily    ascorbic acid (VITAMIN C) 1,000 mg, oral, Daily    BD Ultra-Fine Mini Pen Needle 31 gauge x 3/16\" needle USE AS DIRECTED FIVE TIMES A DAY.    blood-glucose sensor (Dexcom G6 " Sensor) device Use as directed. Change sensors every 10 days    blood-glucose transmitter device (Dexcom G6 Transmitter) device Use as instructed. Change every 90 days    carboxymethylcellulose (Refresh Celluvisc) 1 % ophthalmic solution dropperette ophthalmic (eye)    cholecalciferol (Vitamin D-3) 5,000 Units tablet oral    clonazePAM (KlonoPIN) 0.5 mg tablet 1 tablet, oral, 2 times daily, at the same time.    clotrimazole (Lotrimin) 1 % cream     cyclobenzaprine (Flexeril) 10 mg tablet     diclofenac (VOLTAREN) 50 mg, oral, 3 times daily PRN, 30 day supply    diclofenac sodium (VOLTAREN ARTHRITIS PAIN) 4 g, Topical, 3 times daily PRN    divalproex (Depakote ER) 500 mg 24 hr tablet 1 tablet, oral, 2 times daily    EPINEPHrine 0.3 mg/0.3 mL injection syringe INJECT INTRAMUSCULARLY ONCE AS NEEDED FOR ANAPHYLAXIS    ezetimibe (ZETIA) 10 mg, oral, Daily    Farxiga 5 mg, oral, Daily    fluticasone (Flonase) 50 mcg/actuation nasal spray USE 1 SPRAY INTO EACH NOSTRIL ONCE DAILY.    folic acid (FOLVITE) 1 mg, oral, Daily    furosemide (LASIX) 20 mg, oral, 2 times daily    glucagon (Baqsimi) 3 mg/actuation spray,non-aerosol USE ONE SPRAY IN THE NOSE AS NEEDED FOR LOW BLOOD SUGAR  MAY REPEAT AFTER 15 MINUTES USING A NEW DEVICE IF NO RESPONSE    hydrocortisone (Cortef) 10 mg tablet TAKE ONE TABLET BY MOUTH TWO TIMES A DAY; TAKE DOUBLE DOSES FOR 3 DAYS WHEN ILL    immune globulin, human, (Gammagard) infusion     insulin glargine (Toujeo Max Solostar- 2 unit dial) 300 unit/mL (3 mL) injection Inject 50 units under skin once daily    insulin lispro (HumaLOG KwikPen Insulin) 100 unit/mL injection Use as directed to give up to 100 units a day    insulin pump cart,automated,BT (Omnipod 5 G6 Pods, Gen 5,) cartridge 1 Device, subcutaneous, Every 48 hours    insulin pump cart,cont inf,BT (Omnipod Dash Pods, Gen 4,) cartridge subcutaneous    ipratropium-albuteroL (Duo-Neb) 0.5-2.5 mg/3 mL nebulizer solution 3 mL, inhalation, 4 times  daily PRN    lacosamide (Vimpat) 200 mg tablet tablet 1 tablet, oral, 2 times daily    lacosamide (Vimpat) 50 mg tablet Take 1 tablet (50 mg) by mouth every 12 hours.    lasmiditan 100 mg, oral, As needed, Do not drive in 8 ours of taking this medicine.    levETIRAcetam (KEPPRA) 1,500 mg, oral, 2 times daily    levothyroxine (SYNTHROID, LEVOXYL) 75 mcg, oral, Daily before breakfast    montelukast (SINGULAIR) 10 mg, oral, Nightly    Motegrity 2 mg tablet 1 tablet, oral, Daily    mupirocin (Bactroban) 2 % ointment apply a small amount to the affected area topically three times a day.    naratriptan (AMERGE) 1 mg, oral, Once as needed, May repeat in 4 hours if unresolved. Do not exceed 5 mg in 24 hours.    nasal spray Nayzilam 5 mg/spray (0.1 mL) spray,non-aerosol nasal    OneTouch Delica Plus Lancet 33 gauge misc USE 1 LANCET TO CHECK GLUCOSE ONCE DAILY AS DIRECTED    OneTouch Verio test strips strip USE 1 STRIP TO CHECK GLUCOSE ONCE DAILY AS DIRECTED    pantoprazole (PROTONIX) 40 mg, oral, Daily, Do not crush, chew, or split.    promethazine (Phenergan) 12.5 mg tablet Take 1 tablet (12.5 mg) by mouth if needed.    propranolol LA (INDERAL LA) 60 mg, oral, Daily, Do not crush, chew, or split.    rimegepant (NURTEC ODT) 75 mg, oral, As needed    rOPINIRole (REQUIP) 0.5 mg, oral, Every morning, 1 tab in AM and 2 tabs in PM    scopolamine (Transderm-Scop) 1 mg over 3 days patch 3 day 1 patch, transdermal, Every 72 hours    senna 8.6 mg tablet 1-2 tablets, oral, Nightly PRN    SUMAtriptan (IMITREX) 25 mg, oral, Once as needed, May repeat dose once in 2 hours if no relief.  Do not exceed 2 doses in 24 hours.    tiZANidine (ZANAFLEX) 2 mg, oral, Every 8 hours PRN    ubrogepant (UBRELVY) 100 mg, oral, As needed    ZINC ORAL 100 mg, oral, Daily, Take as directed       No results found for this or any previous visit from the past 1000 days.     No image results found.       No diagnosis found.     ASSESSMENT/PLAN  Jonathan Ayala  is a 48 y.o. female presenting for bilateral SI joint injection     Patient denies any recent antibiotic use or infections, denies any blood thinner use, and denies contrast or local anesthetic allergies     Risks, benefits, alternatives discussed. All questions answered to the best of my ability. Patient agrees to proceed.      Our plan is as follows:  - Proceed with aforementioned procedure          iMnisterio Nelson DO   Pain fellow

## 2024-09-10 NOTE — NURSING NOTE
Procedure cancelled per Dr. Walker d/t pts blood sugar being too high. Pt informed and agrees with decision.

## 2024-09-11 ENCOUNTER — TELEPHONE (OUTPATIENT)
Dept: NEUROLOGY | Facility: CLINIC | Age: 49
End: 2024-09-11
Payer: MEDICAID

## 2024-09-11 DIAGNOSIS — G43.711 CHRONIC MIGRAINE WITHOUT AURA, INTRACTABLE, WITH STATUS MIGRAINOSUS: ICD-10-CM

## 2024-09-12 ENCOUNTER — PREP FOR PROCEDURE (OUTPATIENT)
Dept: PAIN MEDICINE | Facility: CLINIC | Age: 49
End: 2024-09-12
Payer: MEDICAID

## 2024-09-12 ENCOUNTER — PATIENT MESSAGE (OUTPATIENT)
Dept: ENDOCRINOLOGY | Facility: CLINIC | Age: 49
End: 2024-09-12
Payer: MEDICAID

## 2024-09-12 DIAGNOSIS — E11.9 TYPE 2 DIABETES MELLITUS WITHOUT COMPLICATION, UNSPECIFIED WHETHER LONG TERM INSULIN USE (MULTI): ICD-10-CM

## 2024-09-12 DIAGNOSIS — M46.1 SACROILIITIS (CMS-HCC): ICD-10-CM

## 2024-09-12 RX ORDER — INSULIN GLARGINE 300 [IU]/ML
INJECTION, SOLUTION SUBCUTANEOUS
Qty: 9 ML | Refills: 6 | Status: SHIPPED | OUTPATIENT
Start: 2024-09-12

## 2024-09-15 NOTE — PROGRESS NOTES
"Assessment and Plan    06/08/2021 +OKN response OD  09/30/2019 +OKN response OD    09/02/2024 CT head without contrast, which I personally reviewed, shows right frontal encephalomalacia stable from prior.  08/28/2024 CTA head and neck & CT head without contrast, which I personally reviewed, show right frontal encephalomalacia stable from prior.  08/06/2024 CTA head & neck & CT head without contrast, which I personally reviewed, show right frontal encephalomalacia stable from prior.    07/09/2024 CT head without contrast, which I personally reviewed previously, shows stable right frontal encephalomalacia.  06/05/2024 MRI orbits with contrast, which I personally reviewed previously, shows right frontal encephalomalacia similar to prior imaging.  11/07/2023 MRI brain without contrast, by report from [facility], shows \"No acute intracranial abnormality including no evidence of an acute or recent brain parenchymal infarct. \"  11/07/2023 CTA head & neck & CT head without contrast, by report from [facility], show \"No acute abnormality of the cervical and intracranial vasculature.\" And \"No evidence of an acute intracranial process.     Focal RIGHT frontal lobe encephalomalacia deep to a sherley hole, new from   02/25/2020. \"  08/01/2023 MRV head, which I personally reviewed previously, shows no lesion.  07/29/2023 MRI brain & orbits with contrast, which I personally reviewed previously, shows right frontal encephalomalacia consistent with prior bolt.  Interval head imaging.  10/05/2022 CT head without contrast & CTA head & neck, by report from [facility], show \" 1.   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  Overlying right-sided sherley hole.)  2.  Prominence of the ventricles and sulci for age.  \" and \"1. Similar appearing old areas of infarction involving the " "right and left frontal lobes extending to the convexities with underlying encephalomalacia at site of prior hemorrhage from 02/13/2022. Overlying right-sided sherley hole. Prominence of the ventricles and sulci for age. No evidence of acute infarction. No active hemorrhage. 2. No significant stenosis internal carotid arteries. 3. No significant stenosis of the intracranial vessels or evidence of aneurysm. 4. Aberrant right subclavian artery behind the esophagus with no dilation of the proximal esophagus.\"  09/25/2022 CT head without contrast, which I personally reviewed previously, shows no lesion.  04/20/2022 CT head without contrast, which I personally reviewed previously, shows right frontal encephalomalacia judged stable by Radiology.  Interval head imaging.  09/10/2021 MRI brain with contrast, which I personally reviewed previously, shows no lesion.  09/09/2021 CTA head & neck, which I personally reviewed previously, shows no lesion.  09/09/2021 CT head without contrast, which I personally reviewed previously, shows no lesion.  08/11/2021 MRI brain & orbits with contrast & MRV head, which I personally reviewed previously, show left optic atrophy with Radiology noting “Punctate focus of enhancement seen along the left 7th-8th cranial nerve bundle at the level of the porus acusticus, indeterminate. Otherwise unremarkable MRI of the brain.”  Interval head imaging.  06/04/2021 MRI brain & orbits with contrast, which I personally reviewed previously, shows left optic atrophy.  Interval head imaging.  06/29/2017 MRI brain without contrast, which I personally reviewed previously, shows no lesion.  11/22/2007 CT head without contrast, which I personally reviewed previously, shows no lesion.    08/31/2023 lumbar puncture tube 1: , WBC 0, tube 4: RBC 6 & WBC 0, protein 91 & glucose 71.  08/11/2021 lumbar puncture opening pressure 18 cm water, tube 1: RBC 0, WBC 16 (86% L, 13% M), tube 4: RBC 0 & WBC 16 (92% L, 8% M), " protein 71 & glucose 61. Cytology negative. Flow cytometry negative. Oligoclonal bands 0. IgG studies with high CSF IgG & albumin.  08/05/2020 lumbar puncture opening pressure 20 cm water, protein 68, glucose 85. MBP wnl. Oligoclonal bands POSITIVE 4.  12/26/2019 lumbar puncture no opening pressure checked per patient) tube ?: RBC 39, WBC 6 (91% L, 9% M), tube ?: RBC 38, WBC 5, protein 89, glucose 79. (via [employer] from [email or web address])  11/13/2019 lumbar puncture opening pressure 22 cm water, tube 1: RBC 9, WBC 4, tube 4: RBC 1 & WBC 5, protein 82 & glucose 61. Oligoclonal bands 0. IgG studies with non-specific abnormalities. HSV PCR negative.  09/20/2019 lumbar puncture opening pressure 20 cm water, RBC 1, WBC 7 (96% L, 4% M), protein 94 & glucose 78.  01/31/2015 lumbar puncture RBC 2, WBC 0, protein 104 & glucose 74.    08/18/2024 Electrodiagnostic testing.  ffERG: Normal (OU).  Responses of L-/M-cones and S-cones: No abnormality can be detected (OU).  Responses of On- and Off-bipolar cells in cone pathway: Normal (OU).  PhNR (RGC function):  OD: Amplitude reduces mildly and implicit time prolongs mildly.  OS: Amplitude reduces moderately and implicit time prolongs mildly.  mfERG: No abnormality can be found (OU).  PVEP:  OD: Normal.  OS: Amplitude reduces severely.  Hemifield PVEP:  OD: Left-field PVEP amplitude is significantly lower than right-field PVEP.  OS: PVEP of both sides show no detectable components.  07/27/2019 multifocal ERG with mildly reduced central responses.  Pattern VEP with OD borderline latency at 0.25 degrees and OS with severely prolonged latencies and decreased amplitudes.    Lab Results   Component Value Date/Time    SEDRATE 19 08/01/2023 1558    SEDRATE 33 (H) 08/07/2022 0322    SEDRATE 19 05/25/2022 1501    SEDRATE 3 06/05/2020 1110    SEDRATE 6 05/13/2020 1529    SEDRATE 3 03/28/2020 0629    SEDRATE 5 09/16/2019 0507    SEDRATE 8 08/19/2019 1314    CRP 0.90 08/07/2022 0322    CRP 0.35  05/25/2022 1501    CRP 0.34 09/23/2021 0618    CRP 0.71 09/21/2021 0648    CRP 0.14 07/16/2020 0944    CRP 0.10 06/05/2020 1110    CRP <0.10 05/13/2020 1529    CRP <0.10 03/28/2020 0629    CRP 3.64 (A) 11/21/2019 2157    CRP <0.10 08/19/2019 1314      Lab Results   Component Value Date/Time    CDKPFKOK04 299 02/23/2023 0941    YRRVUPCX03 709 02/09/2021 0451    BGLHZPYV51 508 12/10/2019 1349    RCFOL 951 12/10/2019 1349      Lab Results   Component Value Date/Time    NMOAQP4 Negative 11/14/2019 1447    MOGFACS Negative 11/10/2020 1000    MOGFACS Negative 11/14/2019 1447    ACE 26 11/10/2020 1000      Tuberculosis studies  Lab Results   Component Value Date/Time    TBSIN Negative 09/22/2021 0430    TBSIN Negative 11/10/2020 1000    TBSIN Negative 11/14/2019 0531      12/04/2023 ESR 10. CRP <0.3 mg/dL.  10/01/2023 syphilis non-reactive (NR).  09/18/2020 ESR 1. CRP < 0.08 mg/dL (0.8 mg/L).  08/28/2020 HbA1c HIGH 7.0. Lipid panel with LDL 64.  08/05/2020 B12 462. Folate wnl. AQP4 ab negative.  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 Suarez.  07/09/2019 BRETT > 1:640. Anti-centromere HIGH 101 (0-40). scl-70 negative. Chromatin ab IgG, anti-ribosomal ab, anti-Sarahy ab, anti-DNA ab negative.    09/16/2024 OCT RNFL  & OS 36. (Interval new elevation OD & stable thinning OS)  07/25/2024 OCT RNFL OD 89 & OS 37. (Decrease, now at baseline of early 2024)  06/26/2024 OCT RNFL OD 95 & OS 41. (Stable)  06/05/2024 OCT RNFL OD 95 & OS 40. (Stable)  OCT macula OD normal foveal contour 264 & OS normal foveal contour 234.  03/15/2024 OCT RNFL OD 92 & OS 36. (Stable)  01/09/2024 OCT RNFL OD 89 & OS 36. (Stable)  02/06/2023 OCT RNFL OD 92 & OS 38. (stable)  OCT macula OD normal foveal contour 255 & OS thinning RNFL miscentered wrt fovea.  10/07/2022 OCT RNFL OD 92 & OS 40. (decreased OD & stable OS)  09/23/2022 OCT RNFL OD 98 & OS 38. (increased OD & stable OS)  01/27/2022 OCT RNFL OD 88 & OS 37.  (stable)  10/06/2021 OCT RNFL OD 93 & OS 39 (stable)..  08/19/2021 OCT RNFL OD 92 & OS 38. (stable)  06/08/2021 OCT RNFL OD 87 & OS 37. (stable)  01/28/2021 OCT RNFL OD 85 & OS 37. (stable)  11/06/2020 OCT RNFL OD 89 & OS 39. (stable)  07/27/2020 OCT RNFL OD 89 & OS 38. (stable)  01/07/2020 OCT RNFL OD 93 & OS 38. (stable to mild increase OD, stable to mild decrease OS)  11/27/2019 OCT RNFL OD 84 & OS 42. (stable to mild OD decrease)  09/30/2019 OCT RNFL OD 89 & OS 41. (stable)  07/18/2019 OCT RNFL OD 90 & OS 39.  OCT macula OD normal foveal contour 256 & OS thinning of RNFL & GCL, but preserved foveal contour 238.    09/16/2024 HVF 24-2 OD stimulus III, fovea 14, generalized depression MD -31.43 & OS stimulus V, fovea 1, generalized depression with some superior temporal sparing.  07/25/2024 HVF 24-2 OD stimulus V, fovea 39, superior > inferior nasal step & OS stimulus V, fovea 18, generalized depression with some superior temporal sparing (inferior > superior altitudinal.  06/26/2024 HVF 24-2 OD fovea 33, FL 3/15, FP 0%, FN 20%, superior arcuate MD -13.69 & OS stimulus V, fovea 27, generalized depression.  06/05/2024 HVF 24-2 OD fovea 34, FL 2/16, FP 0%, FN 40%, superior altitudinal MD -18.00 & OS stimulus V, fovea 25, generalized depression.  03/15/2024 HVF 24-2 OD fovea 36, wnl MD -1.05 & OS stimulus V, fovea 3, inferior arcuate > superior arcuate.  01/09/2024 HVF 24-2 OD wnl MD -0.94 & OS generalized depression MD -32.10.  02/06/2023 HVF 24-2 OD fovea 36, FL 1/17, FP 0%< FN 15%, scatter MD -7.58 & OS stimulus V, fovea 19, generalized depression.  10/07/2022 HVF 24-2 OD fovea 35, wnl MD -1.26 & OS fovea 5, generalized depression MD -30.62.  09/23/2022 HVF 24-2 OD fovea 38, scatter MD -2.67 & OS stimulus V, fovea 16, superior arcuate & inferior arcuate.  01/27/2022 HVF 24-2 OD fovea 36, wnl MD -1.67 & OS stimulus V, fovea 28, generalized reduction.  10/06/2021 HVF 24-2 OD fovea 36, scatter MD -4.76 & OS stimulus  V, generalized reduction.  08/19/2021 HVF 24-2 OD fovea 39, wnl MD -2.31 & OS stimulus V, fovea 28, generalized depression.  06/08/2021 HVF Stimulus V OD fovea 34, wnl & OS stimulus V, fovea 24, generalized depression.  01/28/2021 HVF 24-2 OD fovea 40, wnl MD -0.63 & OS stimulus V, fovea 28, superior nasal step & inferior arcuate.  11/06/2020 HVF 24-2 OD fovea 32, possible inferior > superior arcuate MD -14.71 & OS stimulus V, fovea 10, generalized depression.  07/27/2020 HVF 24-2 OD fovea 39, wnl MD -2.45 & OS stimulus V, fovea 27, superior & inferior altitudinal.    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    She has new right optic disc edema. One concern is sequential non-arteritic anterior ischemic optic neuropathy, but the differential diagnosis is broad an includes optic neuritis, both typical and atypical forms, as well as intracranial pressure (ICP) elevation given her history of idiopathic intracranial hypertension. Other problems such as neuro-retinitis are possible, too.    I recommend inpatient evaluation at [facility] through the Emergency Department.    Plan    Report to [facility] Emergency Department.  Consult Neurology for possible intracranial pressure (ICP) elevation evaluation including examination for possible spine pathology contributing.  Check MRI brain & orbits with contrast and MRV head.  Check B12, folate, thiamine, syphilis antibody, T-SPOT & ACE. Also check Toxoplasmosis antibodies, Bartonella antibodies, RMSF antibodies, Lyme antibodies. Check NMO/AQP4 & MOG antibodies.  Check  X-ray chest for any infiltrate.  If not contraindicated, check lumbar puncture with opening pressure in the lateral decubitus position with legs extended, protein, glucose & cell count with differential. If opening pressure > 25 cm water, drain to 20 cm water closing pressure up to a maximum of 25 ml CSF drained. Other cerebrospinal fluid (CSF) studies will depend on imaging results.  Consider treatment with methylprednisolone 250 mg IV Q6 hours depending on whether indications are found.    Follow up in 2 weeks with HVF & OCT. (dilated 6/5/2024)

## 2024-09-16 ENCOUNTER — OFFICE VISIT (OUTPATIENT)
Dept: OPHTHALMOLOGY | Facility: CLINIC | Age: 49
End: 2024-09-16
Payer: MEDICAID

## 2024-09-16 DIAGNOSIS — H47.10 EDEMA OF OPTIC DISC OF RIGHT EYE: ICD-10-CM

## 2024-09-16 DIAGNOSIS — G93.2 BENIGN INTRACRANIAL HYPERTENSION: ICD-10-CM

## 2024-09-16 DIAGNOSIS — H47.20 OPTIC ATROPHY: ICD-10-CM

## 2024-09-16 DIAGNOSIS — H47.012 NAION (NON-ARTERITIC ANTERIOR ISCHEMIC OPTIC NEUROPATHY), LEFT EYE: Primary | ICD-10-CM

## 2024-09-16 DIAGNOSIS — H57.09 RELATIVE AFFERENT PUPILLARY DEFECT OF LEFT EYE: ICD-10-CM

## 2024-09-16 LAB
ALBUMIN SERPL BCP-MCNC: 4 G/DL (ref 3.4–5)
ALP SERPL-CCNC: 74 U/L (ref 33–110)
ALT SERPL W P-5'-P-CCNC: 13 U/L (ref 7–45)
ANION GAP SERPL CALC-SCNC: 12 MMOL/L (ref 10–20)
AST SERPL W P-5'-P-CCNC: 15 U/L (ref 9–39)
BASOPHILS # BLD AUTO: 0.04 X10*3/UL (ref 0–0.1)
BASOPHILS NFR BLD AUTO: 0.7 %
BILIRUB SERPL-MCNC: 0.3 MG/DL (ref 0–1.2)
BUN SERPL-MCNC: 15 MG/DL (ref 6–23)
CALCIUM SERPL-MCNC: 9.5 MG/DL (ref 8.6–10.6)
CHLORIDE SERPL-SCNC: 101 MMOL/L (ref 98–107)
CO2 SERPL-SCNC: 29 MMOL/L (ref 21–32)
CREAT SERPL-MCNC: 0.9 MG/DL (ref 0.5–1.05)
EGFRCR SERPLBLD CKD-EPI 2021: 80 ML/MIN/1.73M*2
EOSINOPHIL # BLD AUTO: 0.03 X10*3/UL (ref 0–0.7)
EOSINOPHIL NFR BLD AUTO: 0.6 %
ERYTHROCYTE [DISTWIDTH] IN BLOOD BY AUTOMATED COUNT: 14.7 % (ref 11.5–14.5)
GLUCOSE SERPL-MCNC: 183 MG/DL (ref 74–99)
HCT VFR BLD AUTO: 38.7 % (ref 36–46)
HGB BLD-MCNC: 12 G/DL (ref 12–16)
IMM GRANULOCYTES # BLD AUTO: 0.02 X10*3/UL (ref 0–0.7)
IMM GRANULOCYTES NFR BLD AUTO: 0.4 % (ref 0–0.9)
LYMPHOCYTES # BLD AUTO: 1.66 X10*3/UL (ref 1.2–4.8)
LYMPHOCYTES NFR BLD AUTO: 31.1 %
MCH RBC QN AUTO: 28.1 PG (ref 26–34)
MCHC RBC AUTO-ENTMCNC: 31 G/DL (ref 32–36)
MCV RBC AUTO: 91 FL (ref 80–100)
MONOCYTES # BLD AUTO: 0.47 X10*3/UL (ref 0.1–1)
MONOCYTES NFR BLD AUTO: 8.8 %
NEUTROPHILS # BLD AUTO: 3.12 X10*3/UL (ref 1.2–7.7)
NEUTROPHILS NFR BLD AUTO: 58.4 %
NRBC BLD-RTO: 0 /100 WBCS (ref 0–0)
PLATELET # BLD AUTO: 307 X10*3/UL (ref 150–450)
POTASSIUM SERPL-SCNC: 4.2 MMOL/L (ref 3.5–5.3)
PROT SERPL-MCNC: 8.2 G/DL (ref 6.4–8.2)
RBC # BLD AUTO: 4.27 X10*6/UL (ref 4–5.2)
SODIUM SERPL-SCNC: 138 MMOL/L (ref 136–145)
WBC # BLD AUTO: 5.3 X10*3/UL (ref 4.4–11.3)

## 2024-09-16 PROCEDURE — 36415 COLL VENOUS BLD VENIPUNCTURE: CPT | Performed by: EMERGENCY MEDICINE

## 2024-09-16 PROCEDURE — 92133 CPTRZD OPH DX IMG PST SGM ON: CPT | Performed by: PSYCHIATRY & NEUROLOGY

## 2024-09-16 PROCEDURE — 99285 EMERGENCY DEPT VISIT HI MDM: CPT

## 2024-09-16 PROCEDURE — 92083 EXTENDED VISUAL FIELD XM: CPT | Performed by: PSYCHIATRY & NEUROLOGY

## 2024-09-16 PROCEDURE — 99215 OFFICE O/P EST HI 40 MIN: CPT | Performed by: PSYCHIATRY & NEUROLOGY

## 2024-09-16 PROCEDURE — 85025 COMPLETE CBC W/AUTO DIFF WBC: CPT | Performed by: EMERGENCY MEDICINE

## 2024-09-16 PROCEDURE — 80053 COMPREHEN METABOLIC PANEL: CPT | Performed by: EMERGENCY MEDICINE

## 2024-09-16 PROCEDURE — 99285 EMERGENCY DEPT VISIT HI MDM: CPT | Performed by: EMERGENCY MEDICINE

## 2024-09-16 ASSESSMENT — VISUAL ACUITY
METHOD: SNELLEN - LINEAR
OS_CC: 20/400
CORRECTION_TYPE: GLASSES
OD_CC: 20/30

## 2024-09-16 ASSESSMENT — CONF VISUAL FIELD
OD_SUPERIOR_NASAL_RESTRICTION: 1
OS_INFERIOR_NASAL_RESTRICTION: 1
OS_INFERIOR_TEMPORAL_RESTRICTION: 1
OS_SUPERIOR_NASAL_RESTRICTION: 1
OS_SUPERIOR_TEMPORAL_RESTRICTION: 2
OD_INFERIOR_TEMPORAL_RESTRICTION: 1
OD_SUPERIOR_TEMPORAL_RESTRICTION: 1
OD_INFERIOR_NASAL_RESTRICTION: 1
METHOD: COUNTING FINGERS

## 2024-09-16 ASSESSMENT — ENCOUNTER SYMPTOMS
EYES NEGATIVE: 1
RESPIRATORY NEGATIVE: 0
CARDIOVASCULAR NEGATIVE: 0
MUSCULOSKELETAL NEGATIVE: 0
ENDOCRINE NEGATIVE: 0
ALLERGIC/IMMUNOLOGIC NEGATIVE: 0
CONSTITUTIONAL NEGATIVE: 0
NEUROLOGICAL NEGATIVE: 0
PSYCHIATRIC NEGATIVE: 0
GASTROINTESTINAL NEGATIVE: 0
HEMATOLOGIC/LYMPHATIC NEGATIVE: 0

## 2024-09-16 ASSESSMENT — REFRACTION_WEARINGRX
OD_AXIS: 015
OS_CYLINDER: -3.00
OS_AXIS: 170
OD_CYLINDER: -3.00
OD_SPHERE: -1.50
OS_SPHERE: -1.25

## 2024-09-16 ASSESSMENT — TONOMETRY
OD_IOP_MMHG: 14
OS_IOP_MMHG: 15
IOP_METHOD: GOLDMANN APPLANATION

## 2024-09-16 ASSESSMENT — SLIT LAMP EXAM - LIDS
COMMENTS: NORMAL
COMMENTS: NORMAL

## 2024-09-16 ASSESSMENT — CUP TO DISC RATIO
OS_RATIO: 0.15
OD_RATIO: OBSCURED

## 2024-09-16 ASSESSMENT — EXTERNAL EXAM - RIGHT EYE: OD_EXAM: NORMAL

## 2024-09-16 ASSESSMENT — EXTERNAL EXAM - LEFT EYE: OS_EXAM: NORMAL

## 2024-09-16 ASSESSMENT — PAIN SCALES - GENERAL: PAINLEVEL_OUTOF10: 0 - NO PAIN

## 2024-09-16 ASSESSMENT — PAIN - FUNCTIONAL ASSESSMENT: PAIN_FUNCTIONAL_ASSESSMENT: 0-10

## 2024-09-16 NOTE — ED TRIAGE NOTES
Per optho/neuro note- new right optic disc edema. concern is sequential non-arteritic anterior ischemic optic neuropathy, but the differential diagnosis is broad an includes optic neuritis, both typical and atypical forms, as well as intracranial pressure (ICP) elevation given her history of idiopathic intracranial hypertension. Other problems such as neuro-retinitis are possible, too.

## 2024-09-17 ENCOUNTER — HOSPITAL ENCOUNTER (INPATIENT)
Facility: HOSPITAL | Age: 49
End: 2024-09-17
Attending: EMERGENCY MEDICINE | Admitting: PSYCHIATRY & NEUROLOGY
Payer: MEDICAID

## 2024-09-17 ENCOUNTER — APPOINTMENT (OUTPATIENT)
Dept: RADIOLOGY | Facility: HOSPITAL | Age: 49
End: 2024-09-17
Payer: MEDICAID

## 2024-09-17 DIAGNOSIS — G93.2 IIH (IDIOPATHIC INTRACRANIAL HYPERTENSION): ICD-10-CM

## 2024-09-17 DIAGNOSIS — R09.81 CONGESTION OF NASAL SINUS: ICD-10-CM

## 2024-09-17 DIAGNOSIS — H53.9 VISION CHANGES: Primary | ICD-10-CM

## 2024-09-17 DIAGNOSIS — E27.9 ADRENAL NODULE: ICD-10-CM

## 2024-09-17 LAB
ABO GROUP (TYPE) IN BLOOD: NORMAL
ANTIBODY SCREEN: NORMAL
APTT PPP: 33 SECONDS (ref 27–38)
CREAT SERPL-MCNC: 0.88 MG/DL (ref 0.5–1.05)
EGFRCR SERPLBLD CKD-EPI 2021: 82 ML/MIN/1.73M*2
FOLATE SERPL-MCNC: >24 NG/ML
GLUCOSE BLD MANUAL STRIP-MCNC: 148 MG/DL (ref 74–99)
GLUCOSE BLD MANUAL STRIP-MCNC: 167 MG/DL (ref 74–99)
GLUCOSE BLD MANUAL STRIP-MCNC: 205 MG/DL (ref 74–99)
GLUCOSE BLD MANUAL STRIP-MCNC: 208 MG/DL (ref 74–99)
GLUCOSE BLD MANUAL STRIP-MCNC: 227 MG/DL (ref 74–99)
INR PPP: 1.1 (ref 0.9–1.1)
PROTHROMBIN TIME: 11.9 SECONDS (ref 9.8–12.8)
RH FACTOR (ANTIGEN D): NORMAL
T GONDII IGG SER-ACNC: NONREACTIVE
TREPONEMA PALLIDUM IGG+IGM AB [PRESENCE] IN SERUM OR PLASMA BY IMMUNOASSAY: REACTIVE
VIT B12 SERPL-MCNC: 383 PG/ML (ref 211–911)

## 2024-09-17 PROCEDURE — 70543 MRI ORBT/FAC/NCK W/O &W/DYE: CPT

## 2024-09-17 PROCEDURE — 87476 LYME DIS DNA AMP PROBE: CPT

## 2024-09-17 PROCEDURE — 82565 ASSAY OF CREATININE: CPT

## 2024-09-17 PROCEDURE — 86757 RICKETTSIA ANTIBODY: CPT

## 2024-09-17 PROCEDURE — 84590 ASSAY OF VITAMIN A: CPT

## 2024-09-17 PROCEDURE — 86780 TREPONEMA PALLIDUM: CPT

## 2024-09-17 PROCEDURE — 2500000004 HC RX 250 GENERAL PHARMACY W/ HCPCS (ALT 636 FOR OP/ED)

## 2024-09-17 PROCEDURE — 2550000001 HC RX 255 CONTRASTS: Mod: SE | Performed by: EMERGENCY MEDICINE

## 2024-09-17 PROCEDURE — 82947 ASSAY GLUCOSE BLOOD QUANT: CPT

## 2024-09-17 PROCEDURE — A9575 INJ GADOTERATE MEGLUMI 0.1ML: HCPCS | Mod: SE | Performed by: EMERGENCY MEDICINE

## 2024-09-17 PROCEDURE — 84425 ASSAY OF VITAMIN B-1: CPT

## 2024-09-17 PROCEDURE — 2500000001 HC RX 250 WO HCPCS SELF ADMINISTERED DRUGS (ALT 637 FOR MEDICARE OP): Mod: SE

## 2024-09-17 PROCEDURE — 36415 COLL VENOUS BLD VENIPUNCTURE: CPT

## 2024-09-17 PROCEDURE — 70553 MRI BRAIN STEM W/O & W/DYE: CPT

## 2024-09-17 PROCEDURE — 2500000001 HC RX 250 WO HCPCS SELF ADMINISTERED DRUGS (ALT 637 FOR MEDICARE OP)

## 2024-09-17 PROCEDURE — 82746 ASSAY OF FOLIC ACID SERUM: CPT

## 2024-09-17 PROCEDURE — 71045 X-RAY EXAM CHEST 1 VIEW: CPT

## 2024-09-17 PROCEDURE — 86901 BLOOD TYPING SEROLOGIC RH(D): CPT

## 2024-09-17 PROCEDURE — 70546 MR ANGIOGRAPH HEAD W/O&W/DYE: CPT

## 2024-09-17 PROCEDURE — 86611 BARTONELLA ANTIBODY: CPT

## 2024-09-17 PROCEDURE — 86318 IA INFECTIOUS AGENT ANTIBODY: CPT

## 2024-09-17 PROCEDURE — 86777 TOXOPLASMA ANTIBODY: CPT

## 2024-09-17 PROCEDURE — 86778 TOXOPLASMA ANTIBODY IGM: CPT

## 2024-09-17 PROCEDURE — 1100000001 HC PRIVATE ROOM DAILY

## 2024-09-17 PROCEDURE — 99223 1ST HOSP IP/OBS HIGH 75: CPT | Performed by: PSYCHIATRY & NEUROLOGY

## 2024-09-17 PROCEDURE — 85730 THROMBOPLASTIN TIME PARTIAL: CPT

## 2024-09-17 PROCEDURE — 71045 X-RAY EXAM CHEST 1 VIEW: CPT | Performed by: RADIOLOGY

## 2024-09-17 PROCEDURE — 86481 TB AG RESPONSE T-CELL SUSP: CPT

## 2024-09-17 PROCEDURE — 85610 PROTHROMBIN TIME: CPT

## 2024-09-17 PROCEDURE — 82607 VITAMIN B-12: CPT

## 2024-09-17 PROCEDURE — 2500000002 HC RX 250 W HCPCS SELF ADMINISTERED DRUGS (ALT 637 FOR MEDICARE OP, ALT 636 FOR OP/ED): Mod: SE

## 2024-09-17 PROCEDURE — 82164 ANGIOTENSIN I ENZYME TEST: CPT

## 2024-09-17 PROCEDURE — 86256 FLUORESCENT ANTIBODY TITER: CPT

## 2024-09-17 RX ORDER — FOLIC ACID 1 MG/1
1 TABLET ORAL DAILY
Status: DISCONTINUED | OUTPATIENT
Start: 2024-09-17 | End: 2024-09-25 | Stop reason: HOSPADM

## 2024-09-17 RX ORDER — ONDANSETRON 4 MG/1
4 TABLET, ORALLY DISINTEGRATING ORAL EVERY 8 HOURS PRN
Status: DISCONTINUED | OUTPATIENT
Start: 2024-09-17 | End: 2024-09-21

## 2024-09-17 RX ORDER — HYDROCORTISONE 10 MG/1
10 TABLET ORAL EVERY MORNING
Status: DISCONTINUED | OUTPATIENT
Start: 2024-09-17 | End: 2024-09-23

## 2024-09-17 RX ORDER — IPRATROPIUM BROMIDE AND ALBUTEROL SULFATE 2.5; .5 MG/3ML; MG/3ML
3 SOLUTION RESPIRATORY (INHALATION) 4 TIMES DAILY PRN
Status: DISCONTINUED | OUTPATIENT
Start: 2024-09-17 | End: 2024-09-25 | Stop reason: HOSPADM

## 2024-09-17 RX ORDER — LEVETIRACETAM 250 MG/1
1500 TABLET ORAL 2 TIMES DAILY
Status: DISCONTINUED | OUTPATIENT
Start: 2024-09-17 | End: 2024-09-25 | Stop reason: HOSPADM

## 2024-09-17 RX ORDER — AZELASTINE 1 MG/ML
1 SPRAY, METERED NASAL 2 TIMES DAILY
COMMUNITY

## 2024-09-17 RX ORDER — ROPINIROLE 0.5 MG/1
0.5 TABLET, FILM COATED ORAL EVERY MORNING
Status: DISCONTINUED | OUTPATIENT
Start: 2024-09-18 | End: 2024-09-25 | Stop reason: HOSPADM

## 2024-09-17 RX ORDER — MONTELUKAST SODIUM 10 MG/1
10 TABLET ORAL NIGHTLY
Status: DISCONTINUED | OUTPATIENT
Start: 2024-09-17 | End: 2024-09-25 | Stop reason: HOSPADM

## 2024-09-17 RX ORDER — ONDANSETRON HYDROCHLORIDE 2 MG/ML
4 INJECTION, SOLUTION INTRAVENOUS EVERY 8 HOURS PRN
Status: DISCONTINUED | OUTPATIENT
Start: 2024-09-17 | End: 2024-09-21

## 2024-09-17 RX ORDER — AMITRIPTYLINE HYDROCHLORIDE 100 MG/1
100 TABLET ORAL NIGHTLY
Status: DISCONTINUED | OUTPATIENT
Start: 2024-09-17 | End: 2024-09-25 | Stop reason: HOSPADM

## 2024-09-17 RX ORDER — PROPRANOLOL HYDROCHLORIDE 60 MG/1
60 CAPSULE, EXTENDED RELEASE ORAL DAILY
Status: DISCONTINUED | OUTPATIENT
Start: 2024-09-18 | End: 2024-09-17

## 2024-09-17 RX ORDER — LORAZEPAM 0.5 MG/1
1 TABLET ORAL ONCE
Status: COMPLETED | OUTPATIENT
Start: 2024-09-17 | End: 2024-09-17

## 2024-09-17 RX ORDER — MICONAZOLE NITRATE 2 G/100G
POWDER TOPICAL
COMMUNITY

## 2024-09-17 RX ORDER — EZETIMIBE 10 MG/1
10 TABLET ORAL DAILY
Status: DISCONTINUED | OUTPATIENT
Start: 2024-09-17 | End: 2024-09-25 | Stop reason: HOSPADM

## 2024-09-17 RX ORDER — LEVOTHYROXINE SODIUM 75 UG/1
75 TABLET ORAL
Status: DISCONTINUED | OUTPATIENT
Start: 2024-09-18 | End: 2024-09-25 | Stop reason: HOSPADM

## 2024-09-17 RX ORDER — PROPRANOLOL HYDROCHLORIDE 60 MG/1
60 CAPSULE, EXTENDED RELEASE ORAL DAILY
Status: DISCONTINUED | OUTPATIENT
Start: 2024-09-17 | End: 2024-09-18

## 2024-09-17 RX ORDER — GADOTERATE MEGLUMINE 376.9 MG/ML
20 INJECTION INTRAVENOUS
Status: COMPLETED | OUTPATIENT
Start: 2024-09-17 | End: 2024-09-17

## 2024-09-17 RX ORDER — ACETAMINOPHEN 160 MG/5ML
650 SOLUTION ORAL EVERY 4 HOURS PRN
Status: DISCONTINUED | OUTPATIENT
Start: 2024-09-17 | End: 2024-09-17

## 2024-09-17 RX ORDER — FLUTICASONE FUROATE AND VILANTEROL 200; 25 UG/1; UG/1
1 POWDER RESPIRATORY (INHALATION)
Status: DISCONTINUED | OUTPATIENT
Start: 2024-09-17 | End: 2024-09-20

## 2024-09-17 RX ORDER — ENOXAPARIN SODIUM 100 MG/ML
40 INJECTION SUBCUTANEOUS DAILY
Status: DISCONTINUED | OUTPATIENT
Start: 2024-09-17 | End: 2024-09-25 | Stop reason: HOSPADM

## 2024-09-17 RX ORDER — ONDANSETRON 4 MG/1
4 TABLET, ORALLY DISINTEGRATING ORAL EVERY 8 HOURS PRN
COMMUNITY

## 2024-09-17 RX ORDER — SENNOSIDES 8.6 MG/1
1-2 TABLET ORAL NIGHTLY
Status: DISCONTINUED | OUTPATIENT
Start: 2024-09-17 | End: 2024-09-25 | Stop reason: HOSPADM

## 2024-09-17 RX ORDER — INSULIN LISPRO 100 [IU]/ML
0-5 INJECTION, SOLUTION INTRAVENOUS; SUBCUTANEOUS
Status: DISCONTINUED | OUTPATIENT
Start: 2024-09-17 | End: 2024-09-18

## 2024-09-17 RX ORDER — DEXTROSE 50 % IN WATER (D50W) INTRAVENOUS SYRINGE
25
Status: DISCONTINUED | OUTPATIENT
Start: 2024-09-17 | End: 2024-09-18 | Stop reason: SDUPTHER

## 2024-09-17 RX ORDER — ACETAMINOPHEN 325 MG/1
325-650 TABLET ORAL EVERY 6 HOURS PRN
COMMUNITY

## 2024-09-17 RX ORDER — FUROSEMIDE 20 MG/1
20 TABLET ORAL 2 TIMES DAILY
Status: DISCONTINUED | OUTPATIENT
Start: 2024-09-17 | End: 2024-09-19

## 2024-09-17 RX ORDER — ACETAMINOPHEN 325 MG/1
650 TABLET ORAL EVERY 4 HOURS PRN
Status: DISCONTINUED | OUTPATIENT
Start: 2024-09-17 | End: 2024-09-25 | Stop reason: HOSPADM

## 2024-09-17 RX ORDER — DIVALPROEX SODIUM 500 MG/1
500 TABLET, FILM COATED, EXTENDED RELEASE ORAL 2 TIMES DAILY
Status: DISCONTINUED | OUTPATIENT
Start: 2024-09-17 | End: 2024-09-25 | Stop reason: HOSPADM

## 2024-09-17 RX ORDER — DIPHENHYDRAMINE HCL 12.5MG/5ML
10-20 LIQUID (ML) ORAL DAILY PRN
COMMUNITY

## 2024-09-17 RX ORDER — DEXTROSE 50 % IN WATER (D50W) INTRAVENOUS SYRINGE
12.5
Status: DISCONTINUED | OUTPATIENT
Start: 2024-09-17 | End: 2024-09-18 | Stop reason: SDUPTHER

## 2024-09-17 RX ORDER — CALCIUM CARB/VITAMIN D3/VIT K1 650MG-12.5
2 TABLET,CHEWABLE ORAL DAILY
COMMUNITY

## 2024-09-17 SDOH — SOCIAL STABILITY: SOCIAL INSECURITY: HAVE YOU HAD THOUGHTS OF HARMING ANYONE ELSE?: NO

## 2024-09-17 SDOH — HEALTH STABILITY: MENTAL HEALTH: HAVE YOU WISHED YOU WERE DEAD OR WISHED YOU COULD GO TO SLEEP AND NOT WAKE UP?: NO

## 2024-09-17 SDOH — SOCIAL STABILITY: SOCIAL INSECURITY: HAS ANYONE EVER THREATENED TO HURT YOUR FAMILY OR YOUR PETS?: NO

## 2024-09-17 SDOH — HEALTH STABILITY: MENTAL HEALTH: BEHAVIORAL HEALTH(WDL): WITHIN DEFINED LIMITS

## 2024-09-17 SDOH — SOCIAL STABILITY: SOCIAL INSECURITY: DOES ANYONE TRY TO KEEP YOU FROM HAVING/CONTACTING OTHER FRIENDS OR DOING THINGS OUTSIDE YOUR HOME?: NO

## 2024-09-17 SDOH — SOCIAL STABILITY: SOCIAL INSECURITY: DO YOU FEEL UNSAFE GOING BACK TO THE PLACE WHERE YOU ARE LIVING?: NO

## 2024-09-17 SDOH — SOCIAL STABILITY: SOCIAL INSECURITY: WERE YOU ABLE TO COMPLETE ALL THE BEHAVIORAL HEALTH SCREENINGS?: YES

## 2024-09-17 SDOH — SOCIAL STABILITY: SOCIAL INSECURITY: ARE YOU OR HAVE YOU BEEN THREATENED OR ABUSED PHYSICALLY, EMOTIONALLY, OR SEXUALLY BY ANYONE?: NO

## 2024-09-17 SDOH — SOCIAL STABILITY: SOCIAL INSECURITY: DO YOU FEEL ANYONE HAS EXPLOITED OR TAKEN ADVANTAGE OF YOU FINANCIALLY OR OF YOUR PERSONAL PROPERTY?: NO

## 2024-09-17 SDOH — SOCIAL STABILITY: SOCIAL INSECURITY: ABUSE: ADULT

## 2024-09-17 SDOH — HEALTH STABILITY: MENTAL HEALTH: SUICIDE ASSESSMENT: ADULT (C-SSRS)

## 2024-09-17 SDOH — SOCIAL STABILITY: SOCIAL INSECURITY: HAVE YOU HAD ANY THOUGHTS OF HARMING ANYONE ELSE?: NO

## 2024-09-17 SDOH — SOCIAL STABILITY: SOCIAL INSECURITY: ARE THERE ANY APPARENT SIGNS OF INJURIES/BEHAVIORS THAT COULD BE RELATED TO ABUSE/NEGLECT?: NO

## 2024-09-17 SDOH — HEALTH STABILITY: MENTAL HEALTH: HAVE YOU ACTUALLY HAD ANY THOUGHTS OF KILLING YOURSELF?: NO

## 2024-09-17 SDOH — HEALTH STABILITY: MENTAL HEALTH: HAVE YOU EVER DONE ANYTHING, STARTED TO DO ANYTHING, OR PREPARED TO DO ANYTHING TO END YOUR LIFE?: NO

## 2024-09-17 ASSESSMENT — COGNITIVE AND FUNCTIONAL STATUS - GENERAL
PATIENT BASELINE BEDBOUND: NO
CLIMB 3 TO 5 STEPS WITH RAILING: TOTAL
MOBILITY SCORE: 20
DAILY ACTIVITIY SCORE: 24
MOBILITY SCORE: 20
WALKING IN HOSPITAL ROOM: A LITTLE
CLIMB 3 TO 5 STEPS WITH RAILING: TOTAL
DAILY ACTIVITIY SCORE: 24
WALKING IN HOSPITAL ROOM: A LITTLE

## 2024-09-17 ASSESSMENT — PAIN SCALES - GENERAL
PAINLEVEL_OUTOF10: 3
PAINLEVEL_OUTOF10: 6
PAINLEVEL_OUTOF10: 0 - NO PAIN
PAINLEVEL_OUTOF10: 3

## 2024-09-17 ASSESSMENT — LIFESTYLE VARIABLES
AUDIT-C TOTAL SCORE: 0
HOW MANY STANDARD DRINKS CONTAINING ALCOHOL DO YOU HAVE ON A TYPICAL DAY: PATIENT DOES NOT DRINK
PRESCIPTION_ABUSE_PAST_12_MONTHS: NO
SUBSTANCE_ABUSE_PAST_12_MONTHS: NO
HOW OFTEN DO YOU HAVE 6 OR MORE DRINKS ON ONE OCCASION: NEVER
AUDIT-C TOTAL SCORE: 0
SKIP TO QUESTIONS 9-10: 1
HOW OFTEN DO YOU HAVE A DRINK CONTAINING ALCOHOL: NEVER

## 2024-09-17 ASSESSMENT — PATIENT HEALTH QUESTIONNAIRE - PHQ9
SUM OF ALL RESPONSES TO PHQ9 QUESTIONS 1 & 2: 0
2. FEELING DOWN, DEPRESSED OR HOPELESS: NOT AT ALL
1. LITTLE INTEREST OR PLEASURE IN DOING THINGS: NOT AT ALL

## 2024-09-17 ASSESSMENT — ACTIVITIES OF DAILY LIVING (ADL)
DRESSING YOURSELF: INDEPENDENT
WALKS IN HOME: INDEPENDENT
ADEQUATE_TO_COMPLETE_ADL: NO
BATHING: INDEPENDENT
LACK_OF_TRANSPORTATION: NO
JUDGMENT_ADEQUATE_SAFELY_COMPLETE_DAILY_ACTIVITIES: NO
HEARING - RIGHT EAR: FUNCTIONAL
PATIENT'S MEMORY ADEQUATE TO SAFELY COMPLETE DAILY ACTIVITIES?: YES
HEARING - LEFT EAR: FUNCTIONAL
GROOMING: INDEPENDENT
TOILETING: INDEPENDENT
FEEDING YOURSELF: INDEPENDENT

## 2024-09-17 NOTE — H&P
GENERAL HISTORY AND PHYSICAL  NOTE    History of Present Illness:    Jonathan Ayala is a 48 y.o. 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 Mederos, for further evaluation of increased right optic disc edema. Neurology was consulted per ophthalmology recommendations to evaluate for increased ICP.      History obtained from extensive chart review and from patient at bedside. She saw Dr. Mederos in clinic yesterday for right eye visual disturbance where he noted new right optic disc edema c/f sequential non-arteretic anterior ischemic optic neuropathy vs optic neuritis (atypical vs typical) vs other causes of elevated ICP vs neuro-retinitis. He recommended urgent ED visit for extensive imaging, LP w/ opening pressure and serology work up as well as a neurology consult for evaluation of increased ICP. Per his note: OCT RNFL  & OS 36. (Interval new elevation OD & stable thinning OS) and HVF 24-2 OD stimulus III, fovea 14, generalized depression MD -31.43 & OS stimulus V, fovea 1, generalized depression with some superior temporal sparing.      She reports that she has had progressively worsening painless right eye blurry vision over the past month. She describes it as a curtain going from top to bottom - this is unlike her prior right eye vision loss episodes as it is still persistent over the past month. She also reports worsening balance with recurrent falls as a result and nausea over the past few weeks. She denies any change in her headaches - say that they are of the same intensity as her usual migraines although reports her auras have been different as of late,  as instead of having white flashing lights, she now sees green-orange/red flashing lights. She does note that she has to lay down to alleviate her headaches as she is more nauseous when she is sitting up.      Of note, she had reported right eye vision loss multiple times in the past which on further evaluation was negative on imaging, exam and OCT and attributed to functional vision loss. This subsequently resolved shortly thereafter. It is to be noted that as part of this work up the past, LP in 2020 was positive for OCBs at 4 with subsequent OCB testing negative.     She follows with Dr. Bai for migraines - last saw her virtually July 2024. Migraines are usually right sided, with flashing lights auras and accompanying photo/phonosensitivity and nausea. She is on amitryptiline and -500 for preventative and nurtec for abortive. She fpreviously followed with Dr. Aviles for right frontal focal epilepsy but more recently has established with [facility] epilepsy team and is on LEV [id number], -250. She notes she had a seizure with L sided tonic/clonic activity ~ 2 weeks which she alerted her epileptologist about and they told her to continue her current regimen. 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      She was referred to psychiatry/psychology recently given her anxiety/depression and c/f some functional overlay to her presentation but has never established care with them.      ROS: negative except as per HPI      Prior pertinent work up:  MRI brain and orbits w/ and w/o 6/2024: L optic nerve atrophy stable from prior. Unremarkable right optic nerve. Stable R frontal lobe gliosis.   LP 8/2023: tube 1: , WBC 0, tube 4: RBC 6 & WBC 0, protein 91 & glucose 71.   LP 8/2021: OP 18, tube 1: RBC 0, WBC 16 (86% L, 13% M), tube 4: RBC 0 & WBC 16 (92% L, 8% M), protein 71 & glucose 61.  Cytology negative. Flow cytometry negative. Oligoclonal bands 0. IgG studies with high CSF IgG & albumin.   LP 08/2020: OP 20, protein 68, glucose 85. MBP wnl. Oligoclonal bands POSITIVE 4.     Home Medications:    Amitriptyline 100 mg nightly  Amlodipine 5 mg daily  Cetirizine 10 mg daily  Cyclobenzaprine 10 mg 3 times daily as needed  Dapagliflozin 5 mg daily  Diclofenac 50 mg 3 times daily as needed-for migraines  Depakote  mg twice daily-for depression  Ezetimibe 10 mg daily  Flonase 150 mg as needed  Advair HFA 2 puffs twice daily  Fluticasone 50 mcg 1 spray daily  Folic acid 1 mg daily  Furosemide 20 mg twice daily  Hydrocortisone/acetaminophen 5-3 25, half tablet every 8 hours as needed  Ibuprofen 800 mg every 6 hours as needed  IVIG (Gammagard) q. monthly  Insulin glargine 50 units daily  Insulin lispro  Ketorolac 10 mg every 6 hours as needed   Lacosamide 250 mg twice daily  Levetiracetam 1500 mg twice daily  Levothyroxine 75 mcg daily  Methylprednisolone ?  Montelukast 10 mg nightly  Onsansetron 4 mg as needed  Propranolol 60 mg daily  Motegrity 2 mg daily  Nurtec 75 mg as needed  Ropinirole 0.5 mg every morning-1 mg every afternoon  Scopolamine patch  Sumatriptan 25 mg as needed  Tizanidine 2 mg every 8 hours       Past Medical History:    has a past medical history of Abnormal findings on diagnostic imaging of other abdominal regions, including retroperitoneum (10/14/2020), Acquired deformity of nose (03/24/2022), Acute upper respiratory infection, unspecified (10/16/2019), Allergy status to unspecified drugs, medicaments and biological substances (05/22/2020), Allergy status to unspecified drugs, medicaments and biological substances (11/13/2020), Benign intracranial hypertension (01/27/2022), Bipolar disorder, unspecified (Multi), Breast calcification, right (08/21/2018), Cellulitis of abdominal wall (09/28/2022), Cervicalgia (07/01/2020), Chronic maxillary sinusitis (01/04/2022), Chronic  sialoadenitis (03/16/2020), COVID-19 (01/06/2022), Decreased white blood cell count, unspecified (11/04/2019), Disease of thyroid gland, Disturbances of salivary secretion (03/16/2020), Dry eye syndrome of bilateral lacrimal glands (10/07/2022), Encounter for preprocedural cardiovascular examination (02/01/2022), Food additives allergy status (06/11/2020), Fracture of nasal bones, initial encounter for closed fracture (03/03/2022), Granuloma of right orbit (10/07/2021), History of endometrial ablation (11/09/2017), Hyperlipidemia, Hypertension, Hyperthyroidism, Hypoglycemia, Hypothyroidism, Localized swelling, mass and lump, head (03/24/2022), Major depressive disorder, recurrent, in full remission (CMS-HCC) (10/07/2021), 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), Migraine, Nipple discharge (08/24/2022), Ocular pain, right eye (10/07/2022), Optic atrophy, Other abnormal and inconclusive findings on diagnostic imaging of breast (07/06/2020), Other anomalies of pupillary function (05/31/2019), Other chest pain (05/18/2020), Other conditions influencing health status (08/01/2022), Other conditions influencing health status (08/03/2021), Other specified disorders of eustachian tube, left ear (11/18/2019), Other specified disorders of nose and nasal sinuses (03/24/2022), Other specified disorders of nose and nasal sinuses (03/24/2022), Pelvic and perineal pain (07/06/2020), Personal history of other diseases of the circulatory system (04/07/2020), Personal history of other diseases of the circulatory system (04/07/2020), Personal history of other diseases of the circulatory system, Personal history of other diseases of the digestive system, Personal history of other diseases of the digestive system (03/02/2020), Personal history of other diseases of the musculoskeletal system and connective tissue (01/19/2022), Personal history of other diseases of the musculoskeletal system and connective tissue (03/02/2021),  Personal history of other diseases of the musculoskeletal system and connective tissue (06/16/2020), Personal history of other diseases of the nervous system and sense organs (11/18/2019), Personal history of other diseases of the nervous system and sense organs (09/21/2022), Personal history of other diseases of the respiratory system (04/14/2021), Personal history of other endocrine, nutritional and metabolic disease (02/17/2021), Personal history of other mental and behavioral disorders (05/27/2021), Personal history of other specified conditions (09/07/2022), Personal history of other specified conditions (10/16/2019), Personal history of other specified conditions (09/28/2022), Personal history of other specified conditions (09/16/2021), Personal history of other specified conditions (02/01/2022), Personal history of other specified conditions (03/09/2022), Personal history of other specified conditions (02/12/2014), Personal history of other specified conditions (10/27/2021), Personal history of other specified conditions (10/16/2019), Personal history of other specified conditions (02/26/2021), Personal history of other specified conditions (02/22/2021), Personal history of urinary calculi, Polycystic ovary syndrome, Postural orthostatic tachycardia syndrome (POTS), Rash and other nonspecific skin eruption (03/15/2022), Repeated falls (06/23/2021), Right lower quadrant pain (10/14/2020), Sjogren syndrome, unspecified (Multi), Sjogren's syndrome (Multi), Slow transit constipation (07/09/2020), Subarachnoid hemorrhage, traumatic (Multi) (04/19/2023), Thyroid nodule, Traumatic subarachnoid hemorrhage with loss of consciousness of unspecified duration, subsequent encounter (03/15/2022), Traumatic subarachnoid hemorrhage without loss of consciousness, subsequent encounter, Type 2 diabetes mellitus (Multi), Unspecified disorder of refraction (10/07/2022), Unspecified optic neuritis (11/06/2020), Unspecified optic  neuritis (11/06/2020), Unspecified visual loss (09/25/2019), Venous insufficiency (chronic) (peripheral) (10/18/2021), Viral infection, unspecified (01/11/2022), Vitamin D deficiency, and Vitamin D deficiency, unspecified (09/28/2022).    Past Surgical History:    has a past surgical history that includes Other surgical history (08/22/2019); Other surgical history (08/22/2019); Other surgical history (08/22/2019); Other surgical history (08/22/2019); Other surgical history (08/22/2019); Other surgical history (08/22/2019); MR angio neck wo IV contrast (02/08/2021); MR angio head wo IV contrast (02/08/2021); Ulysses tooth extraction (2004); Appendectomy (2017); Hysterectomy (2017); and Hernia repair (Right, 03/01/2024).    Allergies:   Allergies   Allergen Reactions    Acetazolamide Hives, Rash, Shortness of breath and Swelling     oral hives, redness, ABD pain    Atorvastatin Unknown     Causes muscle pain    Cefdinir Itching, Rash, Shortness of breath and Anaphylaxis     Itchy throat    Throat closing / difficulty breathing.  Took Benadryl at home.    Itchy throat      Throat closing / difficulty breathing.  Took Benadryl at home.    Ceftriaxone Anaphylaxis, Rash, Shortness of breath and Swelling     SOB    SOB hives turned red during gallbladder    Doxepin Anaphylaxis, Hives, Swelling, Angioedema and Rash     Itchy sore throat problems with swallowing    facial swelling    Itchy sore throat problems with swallowing      facial swelling    Duloxetine Anaphylaxis, Hallucinations and Rash     suicidal ideation    suicidal ideation     Other reaction(s): suicidal ideation    suicidal    Suicidal ideation    Levofloxacin Angioedema, Swelling and Rash     eyelid swelling    eyelid swelling. Patient tolerates Avelox    Levofloxacin In D5w Anaphylaxis     Moon face lips swelling just Levaquin tolerated D5W)    Nutritional Supplements Anaphylaxis     Grapes ( red dye    Ozempic [Semaglutide] Nausea/vomiting     Severe  "gastroparesis worsening     Prochlorperazine Anaphylaxis     HIGH Compazine involuntary muscle spasms low doses tolerated)    Red Dye Anaphylaxis    Becky Anaphylaxis and Swelling     Becky    SOB facial swelling    Rosemary Oil Anaphylaxis, Itching and Swelling     Becky  SOB facial swelling      SOB facial swelling    Strawberry Shortness of breath     White blisters in mouth      Other reaction(s): white blisters in mouth, shortness of breath    Sulfa (Sulfonamide Antibiotics) Anaphylaxis, Rash, Shortness of breath and Swelling     SOB itchy hives    Other Reaction(s): rash, SOB      SOB itchy hives    Topiramate Anaphylaxis, Swelling and Angioedema     eyelid swelling    Throat swelling    eyelid swelling  Throat swelling      Throat swelling      eyelid swelling    Tree Pollen-Black Temple Shortness of breath     Other Reaction(s): Other: See Comments      White blisters in mouth      blisters in mouth-carries an EPIPEN-\"I have gotten short of breath\"    Tree Pollen-Pecan Shortness of breath     pecans    Temple Shortness of breath    Aripiprazole Unknown, Fever and Other     fever and tremors    Fevers and Tremors    Tremor    Aspartame Diarrhea     Blood sugar Everett, Diarrhea    Aspartame (Bulk) Diarrhea, Nausea Only and Nausea/vomiting     Other Reaction(s): nausea, diarrhea, abdominal pain      Blood sugar Everett, Diarrhea    Fenofibrate Other     Double vision    Gluten Itching, Other and Rash     Rashes constipation gastric discomfort    Hydrochlorothiazide Hives, Itching and Rash     hctz removed as free-text allergy and entered as [facility] allergy,1455 10/6/2007JO      itchy hives    Hydromorphone Unknown, GI intolerance and Nausea Only     Intolerance nausea instant    Iron Hives and Rash     ichy hive ( can tolerate ferrous gluconate)    Meclizine Angioedema, Other and Swelling     eyelid swelling    Swelling of the eyelids    Eyelids and face    Metformin Other     Other reaction(s): Dyspepsia, " Dyspepsia    Propoxyphene Unknown and Hives     Darvocet itchy hives    Statins-Hmg-Coa Reductase Inhibitors Unknown     Double vision    Tetracyclines Hives, Itching and Rash     sulfa    Rash itching    Itchy  rash    Thiazides Hives, Itching and Rash     itchy hives    Venlafaxine Unknown and Fever     Fevers chills    Wheat Unknown     oral blisters    Adhesive Tape-Silicones Itching and Other    Barbiturates Unknown    Ciprofloxacin Hives    Dhe Unknown     Raynaud's disease    Diet Foods Diarrhea     Aspartame allergy only. Removes to many foods to interface.    Farxiga [Dapagliflozin] Other     Frequent yeast infections and UTI    Ferrous Sulfate Hives    Nsaids (Non-Steroidal Anti-Inflammatory Drug) Unknown and Nausea/vomiting    Other Unknown    Sulfonylureas Unknown    Tobramycin Unknown    Vancomycin Unknown and Hives     Niyah syndrome    Other reaction(s): Other    Red man syndrome if given fast, benadryl with.     Niyah syndrome  Other reaction(s): Other      Other reaction(s): Other      Red man syndrome if given fast, benadryl with.    Adhesive Rash    Azithromycin Hives, Itching and Rash    Betamethasone Nausea And Vomiting, Other, GI intolerance and Diarrhea     N/V/D    House Dust Rash    Metoclopramide Hcl Other    Milk Unknown, Itching and Rash     ABD pain    Prednisone Rash    Propoxyphene-Acetaminophen Hives and Rash    Sulfacetamide Sodium Rash and Swelling    [id number] Antimigraine Agents Nausea Only         None due to Raynaud's  ( Triptans cause a stroke)    Zolpidem Other and Hallucinations     Sleep walk    Other Reaction(s): insomnia and agitation      Sleep walk       Family History:   Family History   Problem Relation Name Age of Onset    Other (Perforated bowel) Mother Radha     Hypertension Mother Radha     Macular degeneration Mother Radha     Hyperlipidemia Father Mac     Hypertension Father Mac     Heart attack Father Mac     Other (S/P CABG) Father Mac      Diabetes Father Mac     Prostate cancer Father Mac     Coronary artery disease Father Mac     Heart failure Father Mac     Stroke Father Mac     Cancer Father Mac     Macular degeneration Father Mac     Retinal detachment Father Mac     Stroke Sister Jazmin     Breast cancer Sister Jazmin     Lupus Sister Jazmin     Ovarian cancer Sister Jazmin     Astigmatism Sister Jazmin     Hyperlipidemia Brother Sanchez     Hypertension Brother Sanchez     Astigmatism Brother Sanchez     Diabetes Father's Sister Linda     Blindness Father's Sister Linda     Thyroid cancer Father's Sister Bekah     Thyroid disease Father's Sister Jessica     Colon cancer Father's Brother ?     Blindness Other Multiple     Melanoma Other      Asthma Other      Other (hay fever) Other      Allergies Other      Cancer Maternal Grandmother Brenda     Cancer Father's Brother Kian        Past Social History:   Social History     Tobacco Use    Smoking status: Never    Smokeless tobacco: Never   Vaping Use    Vaping status: Never Used   Substance Use Topics    Alcohol use: Not Currently     Comment: Socially in College    Drug use: Yes     Types: Benzodiazepines       Vitals:   Temperature:  [36.3 °C (97.4 °F)] 36.3 °C (97.4 °F)  Heart Rate:  [] 90  Respirations:  [15-18] 16  BP: (104-154)/(68-89) 125/86    Physical Exam: (per night float resident Dr. Luna and Dr. Palmer)     MENTAL STATE:   Alert. Orientation was normal to time, place and person. Recent and remote memory was intact.  Attention span and concentration were normal. Language testing was normal for comprehension, repetition, expression, and naming.       OPHTHALMOSCOPIC:   The ophthalmoscopic exam was performed with blurry disc margins in the right eye. Pale disc noted in the left.       CRANIAL NERVES:   CN 2      Visual fields full to confrontation on the right, absent on the left.   CN 3, 4, 6   Pupils round, 6 mm in diameter (pupils were dilated for ophthalmology evaluation), equally  reactive to light. Lids symmetric; no ptosis. EOMs normal alignment, full range with normal saccades, pursuit and convergence. No nystagmus.   CN 5   Facial sensation intact bilaterally.   CN 7   Normal and symmetric facial strength. Nasolabial folds symmetric.   CN 8   Hearing intact to conversation.  CN 9/10  Palate elevates symmetrically.   CN 11   Normal strength of shoulder shrug and neck turning.   CN 12   Tongue midline, with normal bulk and strength; no fasciculations.      MOTOR:   Muscle bulk and tone were normal in both upper and lower extremities.   No fasciculations, tremor or other abnormal movements were present.                          R          L  Deltoids       5          5  Biceps          5          5  Triceps         5          5  Wrist Flex     5          5  Wrist Ext      5          5     Hip Flex         5          5  Knee Flex      5          5  Knee Ext        5          5  Dorsiflex        5          5  Plantarflex     5          5     REFLEXES:                       R          L  BR:               2         2  Biceps:         2          2  Triceps:        2          2  Knee:           1          1  Ankle:          1          1     Babinski: toes downgoing to plantar stimulation. No clonus       SENSORY:   In both upper and lower extremities, sensation was intact to light touch and temperature. Decreased sensation to vibration in bilateral toes > fingers. Decreased sensation to pin prick in a length-dependent manner - mid-fore arm in bilateral upper extremities and ankle in bilateral lower extremities.     COORDINATION:    In both upper extremities, finger-nose-finger was intact without dysmetria or overshoot.      GAIT:   Station was stable with a normal base. Gait was stable with a normal arm swing and speed. No ataxia, shuffling, steppage or waddling was present. No circumduction was present. Tandem gait was intact. Mild swaying on Romberg's but was negative.     Labs:   Results from  last 72 hours   Lab Units 09/16/24 1934   WBC AUTO x10*3/uL 5.3   HEMOGLOBIN g/dL 12.0   HEMATOCRIT % 38.7   PLATELETS AUTO x10*3/uL 307      Results from last 72 hours   Lab Units 09/17/24  0250 09/16/24 1934   SODIUM mmol/L  --  138   POTASSIUM mmol/L  --  4.2   CHLORIDE mmol/L  --  101   CO2 mmol/L  --  29   BUN mg/dL  --  15   CREATININE mg/dL 0.88 0.90   GLUCOSE mg/dL  --  183*      Results from last 72 hours   Lab Units 09/16/24 1934   ALK PHOS U/L 74   AST U/L 15   ALT U/L 13   BILIRUBIN TOTAL mg/dL 0.3               BNP   Date/Time Value Ref Range Status   09/08/2022 04:47 PM 86 0 - 99 pg/mL Final     Comment:     .  <100 pg/mL - Heart failure unlikely  100-299 pg/mL - Intermediate probability of acute heart  .               failure exacerbation. Correlate with clinical  .               context and patient history.    >=300 pg/mL - Heart Failure likely. Correlate with clinical  .               context and patient history.  BNP testing is performed using different testing   methodology at [facility] than at other   [facility]. Direct result comparisons should   only be made within the same method.         Lab Results   Component Value Date    GLUCOSEU Normal 08/06/2024    BLOODU NEGATIVE 08/06/2024    PROTUR NEGATIVE 08/06/2024    NITRITEU NEGATIVE 08/06/2024    LEUKOCYTESU NEGATIVE 08/06/2024         Imaging:  MR brain w and wo IV contrast    Result Date: 9/17/2024  There is similar mild brain parenchymal volume loss when compared with 06/05/2024.   An area of encephalomalacia is again noted within the right frontal lobe convexity. The gradient echo T2 images again demonstrate a small amount of curvilinear blooming dark signal along the margins of the area of encephalomalacia suggesting associated hemosiderin deposition anterior dystrophic calcification/mineralization.   The high-resolution MRI images of the orbits again demonstrate asymmetric atrophy and increased STIR signal within  the left optic nerve when compared with the right similar when compared with the prior MRI of the orbits dated 06/05/2024. Again, no corresponding abnormal enhancement is identified.   The intracranial MRV is within normal limits without evidence of venous sinus thrombosis.     I personally reviewed the images/study and I agree with the findings as stated by Dr. Brody Daley M.D. This study was interpreted at [facility], [place], [place].   MACRO: None.   Signed by: Rob Mendes 9/17/2024 5:36 AM Dictation workstation:   [id number]   No CT head results found for the past 14 days    Assessment/Recommendations  Jonathan Ayala is a 48 y.o. 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 Mederos, for further evaluation of increased right optic disc edema.  Neurology was consulted per ophthalmology recommendations to evaluate for increased ICP. Her main symptoms are progressively worsening right eye blurry vision with accompanying worsening balance and nausea. On exam, she has blurry disc margins on the right with increased RNFL in the right eye on ophthalmological exam c/f right optic disc edema (with stable thinning on the left). Her MRI/MRV images do not show the classic signs of increased ICP or abnormal enhancement and no thrombosis. In terms of etiology for unilateral optic disc edema, atypical optic neuritis remains on the differential vs neuro-retinitis vs less likely IIH/elevated ICP given clinical history, imaging findings and exam.      Plan to admit patient, for high dose steroids and will complete lumbar puncture while inpatient.     #R optic disc  edema   #c/f Optic neuritis   - IV Solumedrol 1g daily   - Pending LP once transferred to the floor     #IIH s/o R frontal bolt 2022 c/b focal epilepsy   #R hemiplegic migraines   - c/w divalproex 500 mg BID   - c/w Lacosamide 250 mg BID   - c/w Keppra 1500 mg BID     #Hypothyroidism   - c/w Levothyroxine 75 mcg daily     #Asthma   - c/w motelukast 10 mg daily   - c/w duonebs PRN   - c/w Breo Ellipta daily     #CVID   #Sjorgen's syndrome   - IVIG infusions every 14 days?, last infusion 09/12    #POTS  - c/w propanolol 60 mg daily       #HLD   - c/w Zetia 10 mg daily     #T2DM   - SSI   - Glucose checks [facility]   - hypoglycemia protocol     MISC: c/w ropinirole daily     F: PRN  E: PRN  N: Regular diet (gluten, lactose free)  A: PIV     O2:Room air   Bowel Regimen: Sennokot nightly,     DVT ppx: Lovenox, held in anticipation of LP      Code Status: Full code (confirmed on admission)        Pamela Portillo MD  Department of Neurology, PGY-3  [facility] [id number]      ----------------------------------------------------------------------------------------------------------------------------------------  ATTENDING ATTESTATION    I saw and evaluated the patient. I personally obtained the key and critical portions of the history and physical exam or was physically present for key and critical portions performed by the resident/fellow. I reviewed the resident/fellow's documentation and discussed the patient with the resident/fellow. I agree with the resident/fellow's medical decision making as documented in the note with the exception/addition of the following:     Admitted with vision changes in right eye, with disc changes seen. Follows with neuro-optho outpatient. Has a history of IIH and has allergies to acetazolamide and topomax; on lasix currently. Differential for worsening also includes inflammatory conditions and thus will start on empiric steroids. Will need LP for opening pressure and to send CSF studies for inflammatory  condition.     Has comorbid conditions of Sjogren's and CVID.     Ciara Elder MD  General Neurology Attending  [facility]

## 2024-09-17 NOTE — ED PROVIDER NOTES
History of Present Illness     History provided by: Patient  Limitations to History: None  External Records Reviewed with Brief Summary: None    HPI:  Jonathan Ayala is a 48 y.o. female past medical history significant for POTS, CVID, Sjogren, diabetes, PCOS, adrenal insufficiency, extensive neuro-ophthalmic history presenting after being referred by her neuro-ophthalmologist for vision changes.  Patient at baseline has blindness in the left eye, has been noticing new changes in the right eye.  Over the past 2 months, patient has lost vision in the lower half fields of the right eye and this is now transitioned to the upper outer field currently.  Patient does note history of headaches and migraines, however does not have one right now.  Review of systems otherwise negative.    Physical Exam   Triage vitals:  T 36.3 °C (97.4 °F)  HR (!) 104  /83  RR 16  O2 99 % None (Room air)    Physical Exam  Vitals and nursing note reviewed.   Constitutional:       General: She is not in acute distress.     Appearance: She is well-developed.   HENT:      Head: Normocephalic and atraumatic.   Eyes:      Conjunctiva/sclera: Conjunctivae normal.   Cardiovascular:      Rate and Rhythm: Normal rate and regular rhythm.      Heart sounds: No murmur heard.  Pulmonary:      Effort: Pulmonary effort is normal. No respiratory distress.      Breath sounds: Normal breath sounds.   Abdominal:      Palpations: Abdomen is soft.      Tenderness: There is no abdominal tenderness.   Musculoskeletal:         General: No swelling.      Cervical back: Neck supple.   Skin:     General: Skin is warm and dry.      Capillary Refill: Capillary refill takes less than 2 seconds.   Neurological:      Mental Status: She is alert.   Psychiatric:         Mood and Affect: Mood normal.          Medical Decision Making & ED Course   Medical Decision Makin y.o. female past medical history significant for POTS, CVID, Sjogren, diabetes, PCOS, adrenal  insufficiency, extensive neuro-ophthalmic history presenting after being referred by her neuro-ophthalmologist for vision changes.  Patient at baseline has blindness in the left eye, has been noticing new changes in the right eye.  Over the past 2 months, patient has lost vision in the lower half fields of the right eye and this is now transitioned to the upper outer field currently.  Patient does note history of headaches and migraines, however does not have one right now.  Review of systems otherwise negative.    Physical exam unremarkable.  Per neuro-ophthalmology note, neurology consulted.   Orders placed as per Dr. Mederos's note, including MRI brain and orbits with contrast, MRV head, B12, folate, thiamine, syphilis antibody, T-SPOT & ACE, Toxoplasmosis antibodies, Bartonella antibodies, RMSF antibodies, Lyme antibodies. Check NMO/AQP4 & MOG antibodies.  Chest x-ray ordered.  ----      Differential diagnoses considered include but are not limited to: optic neuritis, both typical and atypical forms, as well as intracranial pressure (ICP) elevation given her history of idiopathic intracranial hypertension. Other problems such as neuro-retinitis are possible, too.      Social Determinants of Health which Significantly Impact Care: None identified     EKG Independent Interpretation: EKG interpreted by myself. Please see ED Course for full interpretation.    Independent Result Review and Interpretation: Relevant laboratory and radiographic results were reviewed and independently interpreted by myself.  As necessary, they are commented on in the ED Course.    Chronic conditions affecting the patient's care: As documented above in MDM    The patient was discussed with the following consultants/services: None    Care Considerations: As documented above in [facility]    ED Course:     Disposition   Patient was signed out to Dr. Luz Patel at 0200 pending completion of their work-up.  Please see the next provider's transition  of care note for the remainder of the patient's care.     Procedures   Procedures    Patient seen and discussed with ED attending physician.    Lee West DO  Emergency Medicine     Lee West DO  Resident  09/17/24 0225

## 2024-09-17 NOTE — PROGRESS NOTES
48 y.o. female past medical history significant for POTS, CVID, Sjogren, diabetes, PCOS, adrenal insufficiency, extensive neuro-ophthalmic history presenting after being referred by her neuro-ophthalmologist for vision changes.  This patient was a signout to me and during the time of signout patient was pending neurology recommendation.  After speaking to neurology, patient has been scheduled for outpatient lumbar puncture on September 19.  Patient was instructed to be well-hydrated for the procedure.  The ophthalmology team wanted patient admitted for IV steroids.  The neurology team was on board and patient was admitted to the neurology team.

## 2024-09-17 NOTE — PROGRESS NOTES
"Pharmacy Medication History Review    Jonathan Ayala is a 48 y.o. female admitted for No Principal Problem: There is no principal problem currently on the Problem List. Please update the Problem List and refresh.. Pharmacy reviewed the patient's [id number] medications and allergies for accuracy.    Medications ADDED:  Acetaminophen 325 mg tablet   Azelastine 0.1% nasal spray   Benadryl 12.5 mg / 5 mL liquid   Moisturizing mouth ( Biotene Oral Dry Mouth ) solution   Miconazole 2 % powder   Ondansetron ODT 4 mg tablet   Viactiv 650 mg - 12.5 mcg - 40 mcg chewable tablet   Medications CHANGED:  Hydrocortisone 10 mg tablet - updated to \" Patient taking differently ; 1 tab po every morning.\"   Insulin Lispro 100 unit / mL injection - updated to \" Patient taking differently ; up to 100 units a day per sliding scale.\"   Nayzilam 5 mg / spray - updated to \" Patient requesting refill , out of supply at home.\"   Immune Globin ( Gammagard ) infusion - updated sig to \" Infuse 600 mL into a venous catheter every 14 days. \"     Medications REMOVED / NOT TAKING :   Amlodipine 5 mg tablet - Not taking / On hold per provider   Ascorbic acid ( Vitamin C ) 1, 000 mg tablet - Removed   Clotrimazole 1 % cream - Removed   Cyclobenzaprine 10 mg tablet - Removed   Farxiga 5 mg tablet - Patient not taking / Flagged for removal   Diclofenac Sodium ( Voltaren Arthritis Pain ) 1 % gel - Patient not taking / Flagged for removal  Insulin pump cart , automated, BT ( Omnipod 5 G6 Pods , Gen 5) cartridge - Patient not taking / Flagged for removal  Insulin Pump Cart , cont ,inf , BT ( Omnipod Dash Pods , Gen 4 ) cartridge - Removed  Lasmiditan 100 mg tablet - Patient not taking / Flagged for removal  Mupirocin 2% ointment - Removed  Rimegepant ( Nurtec ODT ) 75 mg tablet - Patient not taking   Scopolamine 1 mg over 3 days patch - Patient not taking / Flagged for removal   Sumatriptan 25 mg tablet - Patient not taking / Flagged for removal " "      The list below reflects the updated PTA list.   Prior to Admission Medications   Prescriptions Last Dose Informant   Advair -21 mcg/actuation inhaler Past Week Self   Sig: INHALE TWO PUFFS BY MOUTH AS INSTRUCTED TWO TIMES A DAY.   BD Ultra-Fine Mini Pen Needle 31 gauge x 3/16\" needle  Self   Sig: USE AS DIRECTED FIVE TIMES A DAY.   EPINEPHrine 0.3 mg/0.3 mL injection syringe  Self   Sig: INJECT INTRAMUSCULARLY ONCE AS NEEDED FOR ANAPHYLAXIS   Motegrity 2 mg tablet Past Week Self   Sig: Take 1 tablet (2 mg) by mouth once daily.   OneTouch Delica Plus Lancet 33 gauge misc  Self   Sig: USE 1 LANCET TO CHECK GLUCOSE ONCE DAILY AS DIRECTED   OneTouch Verio test strips strip  Self   Sig: USE 1 STRIP TO CHECK GLUCOSE ONCE DAILY AS DIRECTED   SUMAtriptan (Imitrex) 25 mg tablet Not Taking Self   Sig: Take 1 tablet (25 mg) by mouth 1 time if needed for migraine. May repeat dose once in 2 hours if no relief.  Do not exceed 2 doses in 24 hours.   Patient not taking: Reported on 9/17/2024   ZINC ORAL Past Week Self   Sig: Take 50 mg by mouth once daily. Take as directed   acetaminophen (Tylenol) 325 mg tablet Past Month Self   Sig: Take 1-2 tablets (325-650 mg) by mouth every 6 hours if needed for mild pain (1 - 3). Days of infusions   amLODIPine (Norvasc) 5 mg tablet  Self   Sig: Take 1 tablet (5 mg) by mouth once daily.  Patient Not Taking / On Hold Per Provider   amitriptyline (Elavil) 100 mg tablet Past Week Self   Sig: Take 1 tablet (100 mg) by mouth once daily at bedtime.   azelastine (Astelin) 137 mcg (0.1 %) nasal spray Past Week Self   Sig: Administer 1 spray into each nostril 2 times a day. Use in each nostril as directed   blood-glucose sensor (Dexcom G6 Sensor) device  Self   Sig: Use as directed. Change sensors every 10 days   blood-glucose transmitter device (Dexcom G6 Transmitter) device  Self   Sig: Use as instructed. Change every 90 days   calcium-vitamin D3-vitamin K (Viactiv) 650 mg-12.5 mcg-40 " mcg chewable tablet Past Week Self   Sig: Chew 2 tablets once daily. At lunch   carboxymethylcellulose (Refresh Celluvisc) 1 % ophthalmic solution dropperette Past Week Self   Sig: Administer 2 drops into both eyes 3 times a day as needed for dry eyes.   cholecalciferol (Vitamin D-3) 5,000 Units tablet Past Week Self   Sig: Take 1 tablet (5,000 Units) by mouth once daily.   clonazePAM (KlonoPIN) 0.5 mg tablet Past Week Self   Sig: Take 1 tablet (0.5 mg) by mouth 2 times a day. at the same time.   dapagliflozin propanediol (Farxiga) 5 mg Not Taking Self   Sig: Take 1 tablet (5 mg) by mouth once daily.   Patient not taking: Reported on 9/17/2024   diclofenac (Voltaren) 50 mg EC tablet Past Week Self   Sig: Take 1 tablet (50 mg) by mouth 3 times a day as needed (migraine). 30 day supply   diclofenac sodium (Voltaren Arthritis Pain) 1 % gel Not Taking Self   Sig: Apply 4.5 inches (4 g) topically 3 times a day as needed (knee pain).   Patient not taking: Reported on 9/17/2024   diphenhydrAMINE (BENADryl) 12.5 mg/5 mL liquid Past Month Self   Sig: Take 10-20 mL (25-50 mg) by mouth once daily as needed for allergies (or migraines).   divalproex (Depakote ER) 500 mg 24 hr tablet Past Week Self   Sig: Take 1 tablet (500 mg) by mouth 2 times a day.   ezetimibe (Zetia) 10 mg tablet Past Week Self   Sig: Take 1 tablet (10 mg) by mouth once daily.   fluticasone (Flonase) 50 mcg/actuation nasal spray Past Week Self   Sig: USE 1 SPRAY INTO EACH NOSTRIL ONCE DAILY.   folic acid (Folvite) 1 mg tablet Past Week Self   Sig: Take 1 tablet (1 mg) by mouth once daily.   furosemide (Lasix) 20 mg tablet Past Week Self   Sig: Take 1 tablet (20 mg) by mouth 2 times a day.   glucagon (Baqsimi) 3 mg/actuation spray,non-aerosol Unknown Self   Sig: USE ONE SPRAY IN THE NOSE AS NEEDED FOR LOW BLOOD SUGAR  MAY REPEAT AFTER 15 MINUTES USING A NEW DEVICE IF NO RESPONSE   hydrocortisone (Cortef) 10 mg tablet Past Week Self   Sig: TAKE ONE TABLET BY  MOUTH TWO TIMES A DAY; TAKE DOUBLE DOSES FOR 3 DAYS WHEN ILL   Patient taking differently: Take 1 tablet (10 mg) by mouth once daily in the morning.   immune globulin, human, (Gammagard) infusion 9/12/2024 Self   Sig: Infuse 600 mL (60 g) into a venous catheter every 14 (fourteen) days.   insulin glargine (Toujeo Max Solostar- 2 unit dial) 300 unit/mL (3 mL) injection Past Week Self   Sig: Inject 50 units under skin once daily   insulin lispro (HumaLOG KwikPen Insulin) 100 unit/mL injection Past Week Self   Sig: Use as directed to give up to 100 units a day   Patient taking differently: Use as directed to give up to 100 units a day per sliding scale   insulin pump cart,automated,BT (Omnipod 5 G6 Pods, Gen 5,) cartridge Not Taking Self   Sig: Inject 1 Device under the skin every other day.   Patient not taking: Reported on 9/17/2024   ipratropium-albuteroL (Duo-Neb) 0.5-2.5 mg/3 mL nebulizer solution More than a month Self   Sig: Inhale 3 mL 4 times a day as needed.   lacosamide (Vimpat) 200 mg tablet tablet Past Week Self   Sig: Take 1 tablet (200 mg) by mouth 2 times a day.   lacosamide (Vimpat) 50 mg tablet Past Week Self   Sig: Take 1 tablet (50 mg) by mouth every 12 hours.   lasmiditan 100 mg tablet Not Taking Self   Sig: Take 100 mg by mouth if needed (migraine). Do not drive in 8 ours of taking this medicine.   Patient not taking: Reported on 9/17/2024   levETIRAcetam (Keppra) 750 mg tablet Past Week Self   Sig: Take 2 tablets (1,500 mg) by mouth 2 times a day.   levothyroxine (Synthroid, Levoxyl) 50 mcg tablet Past Week Self   Sig: Take 1.5 tablets (75 mcg) by mouth once daily in the morning. Take before meals.   miconazole (Micotin) 2 % powder Past Week Self   Sig: Apply to groin , under the breast and skin folds daily   moisturizing mouth (Biotene Oral Dry Mouth) solution Past Week Self   Sig: Take 1 spray by mouth 4 times a day as needed.   montelukast (Singulair) 10 mg tablet Past Week Self   Sig: Take 1  tablet (10 mg) by mouth once daily at bedtime.   naratriptan (Amerge) 1 mg tablet Past Month Self   Sig: Take 1 tablet (1 mg) by mouth 1 time if needed for migraine (may repeat x1). May repeat in 4 hours if unresolved. Do not exceed 5 mg in 24 hours.   nasal spray Nayzilam 5 mg/spray (0.1 mL) spray,non-aerosol  Self   Sig: Administer into affected nostril(s).   ondansetron ODT (Zofran-ODT) 4 mg disintegrating tablet Past Week Self   Sig: Take 1 tablet (4 mg) by mouth every 8 hours if needed for nausea or vomiting.   pantoprazole (ProtoNix) 40 mg EC tablet Past Week Self   Sig: Take 1 tablet (40 mg) by mouth once daily. Do not crush, chew, or split.   promethazine (Phenergan) 12.5 mg tablet Past Week Self   Sig: Take 1 tablet (12.5 mg) by mouth if needed.   propranolol LA (Inderal LA) 60 mg 24 hr capsule 9/16/2024 Self   Sig: Take 1 capsule (60 mg) by mouth once daily. Do not crush, chew, or split.   rOPINIRole (Requip) 0.5 mg tablet Past Week Self   Sig: Take 1 tablet (0.5 mg) by mouth once daily in the morning. 1 tab in AM and 2 tabs in PM   rimegepant (Nurtec ODT) 75 mg tablet,disintegrating Not Taking Self   Sig: Take 1 tablet (75 mg) by mouth if needed (migraine).   Patient not taking: Reported on 9/17/2024   scopolamine (Transderm-Scop) 1 mg over 3 days patch 3 day Not Taking Self   Sig: Place 1 patch over 72 hours on the skin every 3rd day.   Patient not taking: Reported on 9/17/2024   senna 8.6 mg tablet Past Week Self   Sig: Take 1-2 tablets (8.6-17.2 mg) by mouth as needed at bedtime for constipation.   tiZANidine (Zanaflex) 2 mg tablet Past Week Self   Sig: Take 1 tablet (2 mg) by mouth every 8 hours if needed for muscle spasms.   ubrogepant (Ubrelvy) 100 mg tablet tablet Past Month Self   Sig: Take 1 tablet (100 mg) by mouth if needed (migraine). May repeat in 2 hours for max of 200mg per 24 hours.      Facility-Administered Medications: None        The list below reflects the updated allergy list. Please  review each documented allergy for additional clarification and justification.  Allergies  Reviewed by Ct Dhaliwal on 9/17/2024        Severity Reactions Comments    Acetazolamide High Hives, Rash, Shortness of breath, Swelling oral hives, redness, ABD pain    Atorvastatin High Unknown Causes muscle pain    Cefdinir High Itching, Rash, Shortness of breath, Anaphylaxis Itchy throat Throat closing / difficulty breathing.  Took Benadryl at home. Itchy throat      Throat closing / difficulty breathing.  Took Benadryl at home.    Ceftriaxone High Anaphylaxis, Rash, Shortness of breath, Swelling SOB SOB hives turned red during gallbladder    Doxepin High Anaphylaxis, Hives, Swelling, Angioedema, Rash Itchy sore throat problems with swallowing facial swelling Itchy sore throat problems with swallowing      facial swelling    Duloxetine High Anaphylaxis, Hallucinations, Rash suicidal ideation suicidal ideation  Other reaction(s): suicidal ideation suicidal Suicidal ideation    Levofloxacin High Angioedema, Swelling, Rash eyelid swelling eyelid swelling. Patient tolerates Avelox    Levofloxacin In D5w High Anaphylaxis [place] face lips swelling just Levaquin tolerated D5W)    Nutritional Supplements High Anaphylaxis Grapes ( red dye    Ozempic [semaglutide] High Nausea/vomiting Severe gastroparesis worsening     Prochlorperazine High Anaphylaxis HIGH Compazine involuntary muscle spasms low doses tolerated)    Red Dye High Anaphylaxis     Becky High Anaphylaxis, Swelling Rosemary SOB facial swelling    Becky Oil High Anaphylaxis, Itching, Swelling Rosemary  SOB facial swelling      SOB facial swelling    Strawberry High Shortness of breath White blisters in mouth      Other reaction(s): white blisters in mouth, shortness of breath    Sulfa (sulfonamide Antibiotics) High Anaphylaxis, Rash, Shortness of breath, Swelling SOB itchy hives Other Reaction(s): rash, SOB      SOB itchy hives    Topiramate High Anaphylaxis,  "Swelling, Angioedema eyelid swelling Throat swelling eyelid swelling  Throat swelling      Throat swelling      eyelid swelling    Tree Pollen-black [place] High Shortness of breath Other Reaction(s): Other: See Comments      White blisters in mouth      blisters in mouth-carries an EPIPEN-\"I have gotten short of breath\"    Tree Pollen-pecan High Shortness of breath pecans    [place] High Shortness of breath     Aripiprazole Medium Unknown, Fever, Other fever and tremors Fevers and Tremors Tremor    Aspartame Medium Diarrhea Blood sugar [place], Diarrhea    Aspartame (bulk) Medium Diarrhea, Nausea Only, Nausea/vomiting Other Reaction(s): nausea, diarrhea, abdominal pain      Blood sugar [place], Diarrhea    Fenofibrate Medium Other Double vision    Gluten Medium Itching, Other, Rash Rashes constipation gastric discomfort    Hydrochlorothiazide Medium Hives, Itching, Rash hctz removed as free-text allergy and entered as [facility] allergy,1455 10/6/2007JO      itchy hives    Hydromorphone Medium Unknown, GI intolerance, Nausea Only Intolerance nausea instant    Iron Medium Hives, Rash ichy hive ( can tolerate ferrous gluconate)    Meclizine Medium Angioedema, Other, Swelling eyelid swelling Swelling of the eyelids Eyelids and face    Metformin Medium Other Other reaction(s): Dyspepsia, Dyspepsia    Propoxyphene Medium Unknown, Hives Darvocet itchy hives    Statins-hmg-coa Reductase Inhibitors Medium Unknown Double vision    Tetracyclines Medium Hives, Itching, Rash sulfa Rash itching Itchy  rash    Thiazides Medium Hives, Itching, Rash itchy hives    Venlafaxine Medium Unknown, Fever Fevers chills    Wheat Medium Unknown oral blisters    Adhesive Tape-silicones Not Specified Itching, Other     Barbiturates Not Specified Unknown     Ciprofloxacin Not Specified Hives     Dhe Not Specified Unknown Raynaud's disease    Diet Foods Not Specified Diarrhea Aspartame allergy only. Removes to many foods to interface.    Farxiga " "[dapagliflozin] Not Specified Other Frequent yeast infections and UTI    Ferrous Sulfate Not Specified Hives     Nsaids (non-steroidal Anti-inflammatory Drug) Not Specified Unknown, Nausea/vomiting     Other Not Specified Unknown     Sulfonylureas Not Specified Unknown     Tobramycin Not Specified Unknown     Vancomycin Not Specified Unknown, Hives Niyah syndrome Other reaction(s): Other Red man syndrome if given fast, benadryl with.  Niyah syndrome  Other reaction(s): Other      Other reaction(s): Other      Red man syndrome if given fast, benadryl with.    Adhesive Low Rash     Azithromycin Low Hives, Itching, Rash     Betamethasone Low Nausea And Vomiting, Other, GI intolerance, Diarrhea N/V/D    House Dust Low Rash     Metoclopramide Hcl Low Other     Milk Low Unknown, Itching, Rash ABD pain    Prednisone Low Rash     Propoxyphene-acetaminophen Low Hives, Rash     Sulfacetamide Sodium Low Rash, Swelling     Fguonlat-[id number] Antimigraine Agents Low Nausea Only None due to Raynaud's  ( Triptans cause a stroke)    Zolpidem Low Other, Hallucinations Sleep walk Other Reaction(s): insomnia and agitation      Sleep walk            Patient declines M2B at discharge.     Sources:   [facility] - 09/11/2024 Lacosamide 50 mg tablet 180 # / 90 days                     - 08/30/2024 Clonazepam 0.5 mg tablet 60 # / 30 days                      - 07/31/2024 Hydrocodone - Acetaminophen 5-325 mg tablet 7 # / 3 days                      - 07/17/2024 Tramadol Hcl 50 mg tablet 8 # / 2 days   Patient interview  Patient dispense [facility] Chart Review  Care Everywhere    Admission Med Rec Grid     Additional Comments:  Patient reports not taking Amlodipine 5 mg tablets , states this medication is on hold per provider.     Patient reports taking Hydrocortisone 10 mg tablets differently than prescribed . Rx sig states to \" Take 1 tablet by mouth two times a day ; take double doses for 3 days when ill \" Patient states she is taking \" 1 tablet " "by mouth once daily in the am & double doses for 3 days when sick.\"     Patient receives Gammagard infusions every 14 days with a dose of 60 g / 600 mL . Patient states her last infusion was on 09/12/2024.    Patient has a typed and printed list with her present at bedside with all current home medications along with doses. Patient also able to name current medications , doses and indications from her memory. Patient is a very reliable historian and appears compliant with all current home medications.     Ct Dhaliwal  Pharmacy Technician  09/17/24     Secure Chat preferred   If no response call [phone] or [employer] \"Med Rec\"   "

## 2024-09-17 NOTE — CONSULTS
"Inpatient consult to Neurology  Consult performed by: Wenceslao Luna MD  Consult ordered by: Ernie Galeana MD      Reason for consult: \"referral from Dr. Mederos neuro-optho\" Per ophthalmology recs: eval for increased ICP     History Of Present Illness  Jonathan Ayala is a 48 y.o. 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 Mederos, for further evaluation of increased right optic disc edema. Neurology was consulted per ophthalmology recommendations to evaluate for increased ICP.     History obtained from extensive chart review and from patient at bedside. She saw Dr. Mederos in clinic yesterday for right eye visual disturbance where he noted new right optic disc edema c/f sequential non-arteretic anterior ischemic optic neuropathy vs optic neuritis (atypical vs typical) vs other causes of elevated ICP vs neuro-retinitis. He recommended urgent ED visit for extensive imaging, LP w/ opening pressure and serology work up as well as a neurology consult for evaluation of increased ICP. Per his note: OCT RNFL  & OS 36. (Interval new elevation OD & stable thinning OS) and HVF 24-2 OD stimulus III, fovea 14, generalized depression MD -31.43 & OS stimulus V, fovea 1, generalized depression with some superior temporal sparing.     She reports that she has had progressively worsening painless right eye blurry vision over the past month. She describes it as a curtain going from top to bottom - this is unlike her prior right eye vision loss episodes as it is still persistent over the past month. She also reports worsening balance with recurrent falls as a result and nausea over " the past few weeks. She denies any change in her headaches - say that they are of the same intensity as her usual migraines although reports her auras have been different as of late, as instead of having white flashing lights, she now sees green-orange/red flashing lights. She does note that she has to lay down to alleviate her headaches as she is more nauseous when she is sitting up.     Of note, she had reported right eye vision loss multiple times in the past which on further evaluation was negative on imaging, exam and OCT and attributed to functional vision loss. This subsequently resolved shortly thereafter. It is to be noted that as part of this work up the past, LP in 2020 was positive for OCBs at 4 with subsequent OCB testing negative.    She follows with Dr. Bai for migraines - last saw her virtually July 2024. Migraines are usually right sided, with flashing lights auras and accompanying photo/phonosensitivity and nausea. She is on amitryptiline and -500 for preventative and nurtec for abortive. She fpreviously followed with Dr. Aviles for right frontal focal epilepsy but more recently has established with [facility] epilepsy team and is on LEV [id number], -250. She notes she had a seizure with L sided tonic/clonic activity ~ 2 weeks which she alerted her epileptologist about and they told her to continue her current regimen. 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     She was referred to psychiatry/psychology recently given her anxiety/depression and c/f some functional overlay to her presentation but has never established care with them.     ROS: negative except as per HPI     Prior pertinent work up:  MRI brain and orbits w/ and w/o 6/2024: L optic nerve atrophy stable from prior. Unremarkable right optic nerve. Stable R frontal lobe gliosis.   LP 8/2023: tube 1: RBC  105, WBC 0, tube 4: RBC 6 & WBC 0, protein 91 & glucose 71.   LP 8/2021: OP 18, tube 1: RBC 0, WBC 16 (86% L, 13% M), tube 4: RBC 0 & WBC 16 (92% L, 8% M), protein 71 & glucose 61. Cytology negative. Flow cytometry negative. Oligoclonal bands 0. IgG studies with high CSF IgG & albumin.   LP 08/2020: OP 20, protein 68, glucose 85. MBP wnl. Oligoclonal bands POSITIVE 4.     Past Medical History  Past Medical History:   Diagnosis Date    Abnormal findings on diagnostic imaging of other abdominal regions, including retroperitoneum 10/14/2020    Abnormal CT of the abdomen    Acquired deformity of nose 03/24/2022    Nasal deformity    Acute upper respiratory infection, unspecified 10/16/2019    Acute URI    Allergy status to unspecified drugs, medicaments and biological substances 05/22/2020    History of drug allergy    Allergy status to unspecified drugs, medicaments and biological substances 11/13/2020    History of adverse drug reaction    Benign intracranial hypertension 01/27/2022    Pseudotumor cerebri    Bipolar disorder, unspecified (Multi)     Bipolar disease, chronic    Breast calcification, right 08/21/2018    Cellulitis of abdominal wall 09/28/2022    Cellulitis of right abdominal wall    Cervicalgia 07/01/2020    Cervicalgia of [id number]-jqmkrti-zubgq region    Chronic maxillary sinusitis 01/04/2022    Chronic maxillary sinusitis    Chronic sialoadenitis 03/16/2020    Chronic sialoadenitis    COVID-19 01/06/2022    COVID-19 with multiple comorbidities    Decreased white blood cell count, unspecified 11/04/2019    Leukopenia    Disease of thyroid gland     Disturbances of salivary secretion 03/16/2020    Xerostomia    Dry eye syndrome of bilateral lacrimal glands 10/07/2022    Dry eyes, bilateral    Encounter for preprocedural cardiovascular examination 02/01/2022    Preoperative cardiovascular examination    Food additives allergy status 06/11/2020    Allergy to food dye    Fracture of nasal bones, initial  encounter for closed fracture 03/03/2022    Closed fracture of nasal bone, initial encounter    Granuloma of right orbit 10/07/2021    Inflammatory pseudotumor of right orbit    History of endometrial ablation 11/09/2017    Hyperlipidemia     Hypertension     Hyperthyroidism     Hypoglycemia     Hypothyroidism     Localized swelling, mass and lump, head 03/24/2022    Swollen nose    Major depressive disorder, recurrent, in full remission (CMS-HCC) 10/07/2021    Depression, major, recurrent, in complete remission    Mammary duct ectasia of left breast 08/24/2022    Periductal mastitis of left breast    Migraine     Nipple discharge 08/24/2022    Bloody discharge from left nipple    Ocular pain, right eye 10/07/2022    Pain in right eye    Optic atrophy     Other abnormal and inconclusive findings on diagnostic imaging of breast 07/06/2020    Other abnormal and inconclusive findings on diagnostic imaging of breast    Other anomalies of pupillary function 05/31/2019    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    Other chest pain 05/18/2020    Chest discomfort    Other conditions influencing health status 08/01/2022    History of cough    Other conditions influencing health status 08/03/2021    Chronic migraine    Other specified disorders of eustachian tube, left ear 11/18/2019    ETD (Eustachian tube dysfunction), left    Other specified disorders of nose and nasal sinuses 03/24/2022    Nasal dryness    Other specified disorders of nose and nasal sinuses 03/24/2022    Nasal crusting    Pelvic and perineal pain 07/06/2020    Pelvic pain    Personal history of other diseases of the circulatory system 04/07/2020    History of sinus tachycardia    Personal history of other diseases of the circulatory system 04/07/2020    History of abnormal electrocardiography    Personal history of other diseases of the circulatory system     History of Raynaud's syndrome    Personal history of other diseases of the digestive  system     History of irritable bowel syndrome    Personal history of other diseases of the digestive system 03/02/2020    History of oral pain    Personal history of other diseases of the musculoskeletal system and connective tissue 01/19/2022    History of neck pain    Personal history of other diseases of the musculoskeletal system and connective tissue 03/02/2021    History of scleroderma    Personal history of other diseases of the musculoskeletal system and connective tissue 06/16/2020    History of muscle weakness    Personal history of other diseases of the nervous system and sense organs 11/18/2019    History of hearing loss    Personal history of other diseases of the nervous system and sense organs 09/21/2022    History of partial seizures    Personal history of other diseases of the respiratory system 04/14/2021    History of asthma    Personal history of other endocrine, nutritional and metabolic disease 02/17/2021    History of diabetes mellitus    Personal history of other mental and behavioral disorders 05/27/2021    History of anxiety    Personal history of other specified conditions 09/07/2022    History of nipple discharge    Personal history of other specified conditions 10/16/2019    History of headache    Personal history of other specified conditions 09/28/2022    History of lump of left breast    Personal history of other specified conditions 09/16/2021    History of persistent cough    Personal history of other specified conditions 02/01/2022    History of palpitations    Personal history of other specified conditions 03/09/2022    History of headache    Personal history of other specified conditions 02/12/2014    History of chest pain    Personal history of other specified conditions 10/27/2021    History of nausea and vomiting    Personal history of other specified conditions 10/16/2019    History of fatigue    Personal history of other specified conditions 02/26/2021    History of  orthopnea    Personal history of other specified conditions 02/22/2021    History of shortness of breath    Personal history of urinary calculi     H/O renal calculi    Polycystic ovary syndrome     Postural orthostatic tachycardia syndrome (POTS)     POTS (postural orthostatic tachycardia syndrome)    Rash and other nonspecific skin eruption 03/15/2022    Rash    Repeated falls 06/23/2021    Recurrent falls    Right lower quadrant pain 10/14/2020    Abdominal pain, RLQ (right lower quadrant)    Sjogren syndrome, unspecified (Multi)     History of Sjogren's disease    Sjogren's syndrome (Multi)     Slow transit constipation 07/09/2020    Slow transit constipation    Subarachnoid hemorrhage, traumatic (Multi) 04/19/2023    Thyroid nodule     Traumatic subarachnoid hemorrhage with loss of consciousness of unspecified duration, subsequent encounter 03/15/2022    Subarachnoid hemorrhage following injury, with loss of consciousness, subsequent encounter    Traumatic subarachnoid hemorrhage without loss of consciousness, subsequent encounter     Subarachnoid hemorrhage following injury, no loss of consciousness, subsequent encounter    Type 2 diabetes mellitus (Multi)     Unspecified disorder of refraction 10/07/2022    Refractive error    Unspecified optic neuritis 11/06/2020    Right optic neuritis    Unspecified optic neuritis 11/06/2020    Optic neuritis, right    Unspecified visual loss 09/25/2019    Vision loss    Venous insufficiency (chronic) (peripheral) 10/18/2021    Chronic venous insufficiency of lower extremity    Viral infection, unspecified 01/11/2022    Nonspecific syndrome suggestive of viral illness    Vitamin D deficiency     Vitamin D deficiency, unspecified 09/28/2022    Vitamin D deficiency     Surgical History  Past Surgical History:   Procedure Laterality Date    APPENDECTOMY  2017    HERNIA REPAIR Right 03/01/2024    with mesh    HYSTERECTOMY  2017    MR HEAD ANGIO WO IV CONTRAST  02/08/2021    MR  HEAD ANGIO WO IV CONTRAST 2/8/2021 [facility]GAC[facility]    MR NECK ANGIO WO IV CONTRAST  02/08/2021    MR NECK ANGIO WO IV CONTRAST 2/8/2021 [facility] CLINICAL LEGAC[facility]    OTHER SURGICAL HISTORY  08/22/2019    Carpal tunnel surgery    OTHER SURGICAL HISTORY  08/22/2019    Hysterectomy    OTHER SURGICAL HISTORY  08/22/2019    Venous access port placement    OTHER SURGICAL HISTORY  08/22/2019    Cholecystectomy    OTHER SURGICAL HISTORY  08/22/2019    Appendectomy    OTHER SURGICAL HISTORY  08/22/2019    Pyloroplasty    WISDOM TOOTH EXTRACTION  2004     Social History  Social History     Tobacco Use    Smoking status: Never    Smokeless tobacco: Never   Vaping Use    Vaping status: Never Used   Substance Use Topics    Alcohol use: Not Currently     Comment: Socially in College    Drug use: Yes     Types: Benzodiazepines     Allergies  Acetazolamide, Atorvastatin, Cefdinir, Ceftriaxone, Doxepin, Duloxetine, Levofloxacin, Levofloxacin in d5w, Nutritional supplements, Ozempic [semaglutide], Prochlorperazine, Red dye, Rosemary, Rosemary oil, Strawberry, Sulfa (sulfonamide antibiotics), Topiramate, Tree pollen-black walnut, Tree pollen-pecan, [place], Aripiprazole, Aspartame, Aspartame (bulk), Fenofibrate, Gluten, Hydrochlorothiazide, Hydromorphone, Iron, Meclizine, Metformin, Propoxyphene, Statins-hmg-coa reductase inhibitors, Tetracyclines, Thiazides, Venlafaxine, Wheat, Adhesive tape-silicones, Barbiturates, Ciprofloxacin, Dhe, Diet foods, Farxiga [dapagliflozin], Ferrous sulfate, Nsaids (non-steroidal anti-inflammatory drug), Other, Sulfonylureas, Tobramycin, Vancomycin, Adhesive, Azithromycin, Betamethasone, House dust, Metoclopramide hcl, Milk, Prednisone, Propoxyphene-acetaminophen, Sulfacetamide sodium, Vvdicxsi-3-md9 antimigraine agents, and Zolpidem  (Not in a hospital admission)    Home medications:   Amitriptyline 100 mg nightly  Amlodipine 5 mg daily  Cetirizine 10 mg daily  Cyclobenzaprine 10 mg 3 times daily as  "needed  Dapagliflozin 5 mg daily  Diclofenac 50 mg 3 times daily as needed-for migraines  Depakote  mg twice daily-for depression  Ezetimibe 10 mg daily  Flonase 150 mg as needed  Advair HFA 2 puffs twice daily  Fluticasone 50 mcg 1 spray daily  Folic acid 1 mg daily  Furosemide 20 mg twice daily  Hydrocortisone/acetaminophen 5-3 25, half tablet every 8 hours as needed  Ibuprofen 800 mg every 6 hours as needed  IVIG (Gammagard) q. monthly  Insulin glargine 50 units daily  Insulin lispro  Ketorolac 10 mg every 6 hours as needed   Lacosamide 250 mg twice daily  Levetiracetam 1500 mg twice daily  Levothyroxine 75 mcg daily  Methylprednisolone ?  Montelukast 10 mg nightly  Onsansetron 4 mg as needed  Propranolol 60 mg daily  Motegrity 2 mg daily  Nurtec 75 mg as needed  Ropinirole 0.5 mg every morning-1 mg every afternoon  Scopolamine patch  Sumatriptan 25 mg as needed  Tizanidine 2 mg every 8 hours    Last Recorded Vitals  Blood pressure 135/86, pulse 88, temperature 36.3 °C (97.4 °F), resp. rate 16, height 1.702 m (5' 7\"), weight 113 kg (250 lb), SpO2 96%.    Neurological Exam:     MENTAL STATE:   Alert. Orientation was normal to time, place and person. Recent and remote memory was intact.  Attention span and concentration were normal. Language testing was normal for comprehension, repetition, expression, and naming.      OPHTHALMOSCOPIC:   The ophthalmoscopic exam was performed with blurry disc margins in the right eye. Pale disc noted in the left.      CRANIAL NERVES:   CN 2   Visual fields full to confrontation on the right, absent on the left.   CN 3, 4, 6   Pupils round, 6 mm in diameter (pupils were dilated for ophthalmology evaluation), equally reactive to light. Lids symmetric; no ptosis. EOMs normal alignment, full range with normal saccades, pursuit and convergence. No nystagmus.   CN 5   Facial sensation intact bilaterally.   CN 7   Normal and symmetric facial strength. Nasolabial folds symmetric. "   CN 8   Hearing intact to conversation.  CN 9/10  Palate elevates symmetrically.   CN 11   Normal strength of shoulder shrug and neck turning.   CN 12   Tongue midline, with normal bulk and strength; no fasciculations.     MOTOR:   Muscle bulk and tone were normal in both upper and lower extremities.   No fasciculations, tremor or other abnormal movements were present.                         R          L  Deltoids       5          5  Biceps          5          5  Triceps         5          5  Wrist Flex     5          5  Wrist Ext      5          5    Hip Flex         5          5  Knee Flex      5          5  Knee Ext        5          5  Dorsiflex        5          5  Plantarflex     5          5    REFLEXES:                       R          L  BR:               2         2  Biceps:         2          2  Triceps:        2          2  Knee:           1          1  Ankle:          1          1    Babinski: toes downgoing to plantar stimulation. No clonus      SENSORY:   In both upper and lower extremities, sensation was intact to light touch and temperature. Decreased sensation to vibration in bilateral toes > fingers. Decreased sensation to pin prick in a length-dependent manner - mid-fore arm in bilateral upper extremities and ankle in bilateral lower extremities.    COORDINATION:    In both upper extremities, finger-nose-finger was intact without dysmetria or overshoot.     GAIT:   Station was stable with a normal base. Gait was stable with a normal arm swing and speed. No ataxia, shuffling, steppage or waddling was present. No circumduction was present. Tandem gait was intact. Mild swaying on Romberg's but was negative.     Relevant Results  MRI brain and orbits w/ and w/o + MRV personally reviewed. MRI with stable R frontal gliosis. MRI orbits with no abnormal enhancement of optic nerves bilaterally. Optic nerves are not tortous. MRV without evidence of venous sinus thrombosis.     Impression:     There is  similar mild brain parenchymal volume loss when compared  with 06/05/2024.      An area of encephalomalacia is again noted within the right frontal  lobe convexity. The gradient echo T2 images again demonstrate a small  amount of curvilinear blooming dark signal along the margins of the  area of encephalomalacia suggesting associated hemosiderin deposition  anterior dystrophic calcification/mineralization.      The high-resolution MRI images of the orbits again demonstrate  asymmetric atrophy and increased STIR signal within the left optic  nerve when compared with the right similar when compared with the  prior MRI of the orbits dated 06/05/2024. Again, no corresponding  abnormal enhancement is identified.      The intracranial MRV is within normal limits without evidence of  venous sinus thrombosis.                         Assessment/Plan   Assessment & Plan      Jonathan Ayala is a 48 y.o. 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 Mederos, for further evaluation of increased right optic disc edema.  Neurology was consulted per ophthalmology recommendations to evaluate for increased ICP. Her main symptoms are progressively worsening right eye blurry vision with accompanying worsening balance and nausea. On exam, she has blurry disc margins on the right with increased RNFL in the right eye on ophthalmological exam c/f right optic disc edema (with stable thinning on the left). Her MRI/MRV images do not show the classic signs of increased ICP or abnormal enhancement and no thrombosis. In terms of etiology for unilateral optic disc edema, atypical optic neuritis remains on the differential  vs neuro-retinitis vs less likely IIH/elevated ICP given clinical history, imaging findings and exam.     We discussed extensively with the patient about approach for the LP to complete her work up. She notes all of her prior LP's have been under fluoroscopy guidance given prior failed bedside attempts. She would prefer to have it under fluoroscopy given her immunocompromised state with IVIG and does not want to be poked multiple times unnecessarily. We discussed that we can have it done on an outpatient basis and have been able to schedule within 24-48 hours with IR. She was agreeable to this plan.     Recommendations:   - Recommend STAT outpatient IR guided lumbar puncture with opening pressure in the lateral decubitus position and send for the following studies: cell count w/ diff, protein, glucose.   - Agree with remainder of extensive work up per outpatient ophthalmology recommendations including serology work up.     Patient seen, discussed and examined with senior resident. To be formally staffed with attending in AM. Recommendations not final until note is signed by attending.     Wenceslao Luna MD   Neurology, PGY2  General Neurology [id number]    ----------------------------------------------------------------------------------------------------------------------------------------  ATTENDING ATTESTATION    Patient was admitted to the general neurology team. Please read H and P note.    Ciara Elder MD  General Neurology  [facility]

## 2024-09-17 NOTE — PROGRESS NOTES
"Jonathan Ayala is a 48 y.o. female on day 0 of admission presenting with Vision changes.      Subjective   She had MRI scans and laboratory workup overnight that are so far unrevealing. She had evaluation with neurology and per discussion with patient have preferred outpatient LP with IR as patient is not willing to have LP without fluoroscopic guidance.     Today she feels her right eye vision is stable with decreased vision in the bottom portion of the right eye that began on 9/7/24.       Objective     Last Recorded Vitals  Blood pressure (!) 142/92, pulse 93, temperature 36.3 °C (97.4 °F), resp. rate 16, height 1.702 m (5' 7\"), weight 113 kg (250 lb), SpO2 100%.    Physical Exam  Base Eye Exam       Visual Acuity (Snellen - Linear)         Right Left    Near cc 20/50 ph 20/30+1 20/400 ph 20/200      Correction: Glasses              Tonometry (Tonopen, 10:42 AM)         Right Left    Pressure 18 15              Pupils         Dark Light Shape React    Right 6 6 Round pharm dilated    Left 6 6 Round pharm dilated              Visual Fields (Counting fingers)         Left Right                                Extraocular Movement         Right Left     Full, Ortho Full, Ortho              Neuro/Psych       Oriented x3: Yes                  Additional Tests       Color         Right Left    Ishihara 13/14 unable                  - Labs: B12 wnl, Folate 1nl, Toxo IgG wnl, Syphilis total antibodies (Ab) reactive  - Pending labs: CNS demyelinating panel, ACE, Rickettsia, Lyme, Bartonella, Vitamin A, Vitamin B1, Tspot, RPR, cerebrospinal fluid (CSF) studies    - MRI Brain and Orbit with and without contrast, MRV:  IMPRESSION:  There is similar mild brain parenchymal volume loss when compared with 06/05/2024.      An area of encephalomalacia is again noted within the right frontal lobe convexity. The gradient echo T2 images again demonstrate a small amount of curvilinear blooming dark signal along the margins of the area " of encephalomalacia suggesting associated hemosiderin deposition anterior dystrophic calcification/mineralization.      The high-resolution MRI images of the orbits again demonstrate asymmetric atrophy and increased STIR signal within the left optic nerve when compared with the right similar when compared with the prior MRI of the orbits dated 06/05/2024. Again, no corresponding abnormal enhancement is identified.      The intracranial MRV is within normal limits without evidence of  venous sinus thrombosis.      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    Seen in clinic with Dr. Mederos on 9/16/24 with new grade 4 right optic disc edema. Differential diagnosis includes sequential non-arteritic anterior ischemic optic neuropathy, but also optic neuritis, both typical and atypical forms, as well as intracranial pressure (ICP) elevation given her history of idiopathic intracranial hypertension, neuro-retinitis. Testing so far without evidence of enhancement of inflammatory lesions, no VST suggesting against inflammatory etiology, however laboratory studies for atypical optic neuritis still pending.     #Unilateral optic disc edema, right eye  - MRI Brain and orbits and MRV without enhancement of the optic nerve, demyelinating lesions, or VST  - Chest Xray without infiltrate or hilar adenopathy  - Laboratory workup so far unremarkable. Syphilis Ab positive, awaiting RPR and CSF studies  - Lumbar puncture with opening pressure in the lateral decubitus position with legs extended, protein, glucose & cell count with differential. If opening pressure > 25 cm  water, drain to 20 cm water closing pressure up to a maximum of 25 ml CSF drained   - Discussed with patient lack of findings for inflammatory etiologies at this time. Discussed risks and benefits of initiating IVMP as a possible treatment option for an undetected inflammatory condition causing her vision change with the understanding that it may not cause improvement. Patient wishes to do anything to potentially save or restore her vision and would like to proceed with IVMP  - Consider treatment with methylprednisolone 250 mg IV Q6 hours. Appreciate neurology recommendations regarding final dosing and duration.     Ophthalmology to continue to follow while inpatient    Thank you for the consult. Note not final until attending signature. Please contact the Ophthalmology service with further questions or concerns.    Pager: [phone]    Rob Estrada MD  Department of Ophthalmology, PGY-3

## 2024-09-18 ENCOUNTER — TELEPHONE (OUTPATIENT)
Dept: PRIMARY CARE | Facility: CLINIC | Age: 49
End: 2024-09-18
Payer: MEDICAID

## 2024-09-18 LAB
ALBUMIN SERPL BCP-MCNC: 4 G/DL (ref 3.4–5)
ANION GAP SERPL CALC-SCNC: 14 MMOL/L (ref 10–20)
APPEARANCE CSF: CLEAR
BASOPHILS # BLD AUTO: 0 X10*3/UL (ref 0–0.1)
BASOPHILS NFR BLD AUTO: 0 %
BASOPHILS NFR CSF MANUAL: 0 %
BLASTS CSF MANUAL: 0 %
BUN SERPL-MCNC: 14 MG/DL (ref 6–23)
C GATTII+NEOFOR DNA CSF QL NAA+NON-PROBE: NOT DETECTED
CALCIUM SERPL-MCNC: 9.2 MG/DL (ref 8.6–10.6)
CHLORIDE SERPL-SCNC: 98 MMOL/L (ref 98–107)
CMV DNA CSF QL NAA+NON-PROBE: NOT DETECTED
CO2 SERPL-SCNC: 26 MMOL/L (ref 21–32)
COLOR CSF: COLORLESS
COLOR CSF: COLORLESS
COLOR SPUN CSF: COLORLESS
CREAT SERPL-MCNC: 0.87 MG/DL (ref 0.5–1.05)
E COLI K1 DNA CSF QL NAA+NON-PROBE: NOT DETECTED
EGFRCR SERPLBLD CKD-EPI 2021: 83 ML/MIN/1.73M*2
EOSINOPHIL # BLD AUTO: 0 X10*3/UL (ref 0–0.7)
EOSINOPHIL NFR BLD AUTO: 0 %
EOSINOPHIL NFR CSF MANUAL: 0 %
ERYTHROCYTE [DISTWIDTH] IN BLOOD BY AUTOMATED COUNT: 14 % (ref 11.5–14.5)
EV RNA CSF QL NAA+NON-PROBE: NOT DETECTED
GLUCOSE BLD MANUAL STRIP-MCNC: 285 MG/DL (ref 74–99)
GLUCOSE BLD MANUAL STRIP-MCNC: 337 MG/DL (ref 74–99)
GLUCOSE BLD MANUAL STRIP-MCNC: 349 MG/DL (ref 74–99)
GLUCOSE BLD MANUAL STRIP-MCNC: 356 MG/DL (ref 74–99)
GLUCOSE CSF-MCNC: 154 MG/DL (ref 40–70)
GLUCOSE SERPL-MCNC: 298 MG/DL (ref 74–99)
GP B STREP DNA CSF QL NAA+NON-PROBE: NOT DETECTED
HAEM INFLU DNA CSF QL NAA+NON-PROBE: NOT DETECTED
HCT VFR BLD AUTO: 39.4 % (ref 36–46)
HGB BLD-MCNC: 12.7 G/DL (ref 12–16)
HHV6 DNA CSF QL NAA+NON-PROBE: NOT DETECTED
HSV1 DNA CSF QL NAA+NON-PROBE: NOT DETECTED
HSV1 DNA CSF QL NAA+PROBE: NOT DETECTED
HSV2 DNA CSF QL NAA+NON-PROBE: NOT DETECTED
HSV2 DNA CSF QL NAA+PROBE: NOT DETECTED
IMM GRANULOCYTES # BLD AUTO: 0.03 X10*3/UL (ref 0–0.7)
IMM GRANULOCYTES NFR BLD AUTO: 0.5 % (ref 0–0.9)
IMM GRANULOCYTES NFR CSF: 0 %
L MONOCYTOG DNA CSF QL NAA+NON-PROBE: NOT DETECTED
LDH CSF L TO P-CCNC: <25 U/L
LYMPHOCYTES # BLD AUTO: 0.69 X10*3/UL (ref 1.2–4.8)
LYMPHOCYTES NFR BLD AUTO: 10.4 %
LYMPHOCYTES NFR CSF MANUAL: 29 % (ref 28–96)
MAGNESIUM SERPL-MCNC: 1.89 MG/DL (ref 1.6–2.4)
MCH RBC QN AUTO: 28.4 PG (ref 26–34)
MCHC RBC AUTO-ENTMCNC: 32.2 G/DL (ref 32–36)
MCV RBC AUTO: 88 FL (ref 80–100)
MONOCYTES # BLD AUTO: 0.05 X10*3/UL (ref 0.1–1)
MONOCYTES NFR BLD AUTO: 0.8 %
MONOS+MACROS NFR CSF MANUAL: 36 % (ref 16–56)
N MEN DNA CSF QL NAA+NON-PROBE: NOT DETECTED
NEUTROPHILS # BLD AUTO: 5.87 X10*3/UL (ref 1.2–7.7)
NEUTROPHILS NFR BLD AUTO: 88.3 %
NEUTS SEG NFR CSF MANUAL: 36 % (ref 0–5)
NRBC BLD-RTO: 0 /100 WBCS (ref 0–0)
OTHER CELLS NFR CSF MANUAL: 0 %
PARECHOVIRUS A RNA CSF QL NAA+NON-PROBE: NOT DETECTED
PHOSPHATE SERPL-MCNC: 2.7 MG/DL (ref 2.5–4.9)
PLASMA CELLS NFR CSF MICRO: 0 %
PLATELET # BLD AUTO: 278 X10*3/UL (ref 150–450)
POTASSIUM SERPL-SCNC: 4.6 MMOL/L (ref 3.5–5.3)
PROT CSF-MCNC: 79 MG/DL (ref 15–45)
RBC # BLD AUTO: 4.47 X10*6/UL (ref 4–5.2)
RBC # CSF AUTO: 1000 /UL (ref 0–5)
RPR SER QL: NONREACTIVE
S PNEUM DNA CSF QL NAA+NON-PROBE: NOT DETECTED
SODIUM SERPL-SCNC: 133 MMOL/L (ref 136–145)
T PALLIDUM AB SER QL AGGL: NONREACTIVE
TOTAL CELLS COUNTED CSF: 14
TUBE # CSF: ABNORMAL
VZV DNA CSF QL NAA+NON-PROBE: NOT DETECTED
WBC # BLD AUTO: 6.6 X10*3/UL (ref 4.4–11.3)
WBC # CSF AUTO: 6 /UL (ref 1–5)

## 2024-09-18 PROCEDURE — 80069 RENAL FUNCTION PANEL: CPT

## 2024-09-18 PROCEDURE — 83735 ASSAY OF MAGNESIUM: CPT

## 2024-09-18 PROCEDURE — 87799 DETECT AGENT NOS DNA QUANT: CPT

## 2024-09-18 PROCEDURE — 88187 FLOWCYTOMETRY/READ 2-8: CPT

## 2024-09-18 PROCEDURE — 82040 ASSAY OF SERUM ALBUMIN: CPT

## 2024-09-18 PROCEDURE — 87529 HSV DNA AMP PROBE: CPT

## 2024-09-18 PROCEDURE — 87798 DETECT AGENT NOS DNA AMP: CPT

## 2024-09-18 PROCEDURE — 2500000002 HC RX 250 W HCPCS SELF ADMINISTERED DRUGS (ALT 637 FOR MEDICARE OP, ALT 636 FOR OP/ED)

## 2024-09-18 PROCEDURE — 2500000001 HC RX 250 WO HCPCS SELF ADMINISTERED DRUGS (ALT 637 FOR MEDICARE OP)

## 2024-09-18 PROCEDURE — 009U3ZX DRAINAGE OF SPINAL CANAL, PERCUTANEOUS APPROACH, DIAGNOSTIC: ICD-10-PCS

## 2024-09-18 PROCEDURE — 86592 SYPHILIS TEST NON-TREP QUAL: CPT

## 2024-09-18 PROCEDURE — 87205 SMEAR GRAM STAIN: CPT

## 2024-09-18 PROCEDURE — 1100000001 HC PRIVATE ROOM DAILY

## 2024-09-18 PROCEDURE — 88185 FLOWCYTOMETRY/TC ADD-ON: CPT | Mod: TC

## 2024-09-18 PROCEDURE — 89051 BODY FLUID CELL COUNT: CPT

## 2024-09-18 PROCEDURE — 88112 CYTOPATH CELL ENHANCE TECH: CPT | Performed by: PATHOLOGY

## 2024-09-18 PROCEDURE — 82945 GLUCOSE OTHER FLUID: CPT

## 2024-09-18 PROCEDURE — 87102 FUNGUS ISOLATION CULTURE: CPT

## 2024-09-18 PROCEDURE — 99231 SBSQ HOSP IP/OBS SF/LOW 25: CPT | Performed by: PSYCHIATRY & NEUROLOGY

## 2024-09-18 PROCEDURE — 82947 ASSAY GLUCOSE BLOOD QUANT: CPT

## 2024-09-18 PROCEDURE — 86255 FLUORESCENT ANTIBODY SCREEN: CPT

## 2024-09-18 PROCEDURE — 2500000004 HC RX 250 GENERAL PHARMACY W/ HCPCS (ALT 636 FOR OP/ED)

## 2024-09-18 PROCEDURE — 83615 LACTATE (LD) (LDH) ENZYME: CPT

## 2024-09-18 PROCEDURE — 87483 CNS DNA AMP PROBE TYPE 12-25: CPT

## 2024-09-18 PROCEDURE — 84157 ASSAY OF PROTEIN OTHER: CPT

## 2024-09-18 PROCEDURE — 85025 COMPLETE CBC W/AUTO DIFF WBC: CPT

## 2024-09-18 PROCEDURE — 88112 CYTOPATH CELL ENHANCE TECH: CPT | Mod: TC,MCY

## 2024-09-18 PROCEDURE — 62270 DX LMBR SPI PNXR: CPT | Mod: GC

## 2024-09-18 RX ORDER — OXYCODONE HYDROCHLORIDE 10 MG/1
10 TABLET ORAL ONCE
Status: COMPLETED | OUTPATIENT
Start: 2024-09-18 | End: 2024-09-18

## 2024-09-18 RX ORDER — INSULIN LISPRO 100 [IU]/ML
0-5 INJECTION, SOLUTION INTRAVENOUS; SUBCUTANEOUS
Status: DISCONTINUED | OUTPATIENT
Start: 2024-09-18 | End: 2024-09-18

## 2024-09-18 RX ORDER — INSULIN GLARGINE 100 [IU]/ML
25 INJECTION, SOLUTION SUBCUTANEOUS NIGHTLY
Status: DISCONTINUED | OUTPATIENT
Start: 2024-09-18 | End: 2024-09-19

## 2024-09-18 RX ORDER — LORAZEPAM 1 MG/1
1 TABLET ORAL ONCE
Status: COMPLETED | OUTPATIENT
Start: 2024-09-18 | End: 2024-09-18

## 2024-09-18 RX ORDER — MAGNESIUM SULFATE HEPTAHYDRATE 40 MG/ML
2 INJECTION, SOLUTION INTRAVENOUS ONCE
Status: COMPLETED | OUTPATIENT
Start: 2024-09-18 | End: 2024-09-18

## 2024-09-18 RX ORDER — INSULIN LISPRO 100 [IU]/ML
0-10 INJECTION, SOLUTION INTRAVENOUS; SUBCUTANEOUS
Status: DISPENSED | OUTPATIENT
Start: 2024-09-18 | End: 2024-09-23

## 2024-09-18 RX ORDER — DEXTROSE 50 % IN WATER (D50W) INTRAVENOUS SYRINGE
12.5
Status: DISCONTINUED | OUTPATIENT
Start: 2024-09-18 | End: 2024-09-19

## 2024-09-18 RX ORDER — PROPRANOLOL HYDROCHLORIDE 20 MG/1
20 TABLET ORAL 3 TIMES DAILY
Status: DISCONTINUED | OUTPATIENT
Start: 2024-09-18 | End: 2024-09-25 | Stop reason: HOSPADM

## 2024-09-18 RX ORDER — DEXTROSE 50 % IN WATER (D50W) INTRAVENOUS SYRINGE
25
Status: DISCONTINUED | OUTPATIENT
Start: 2024-09-18 | End: 2024-09-19

## 2024-09-18 ASSESSMENT — COGNITIVE AND FUNCTIONAL STATUS - GENERAL
DAILY ACTIVITIY SCORE: 24
CLIMB 3 TO 5 STEPS WITH RAILING: A LITTLE
MOBILITY SCORE: 23
MOBILITY SCORE: 23
CLIMB 3 TO 5 STEPS WITH RAILING: A LITTLE

## 2024-09-18 ASSESSMENT — PAIN SCALES - GENERAL
PAINLEVEL_OUTOF10: 2
PAINLEVEL_OUTOF10: 0 - NO PAIN

## 2024-09-18 NOTE — PROGRESS NOTES
"Jonathan Ayala is a 48 y.o. female on day 1 of admission presenting with Vision changes.    Subjective   Jonathan Ayala is a 48 y.o. 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      OVN Updates:  Pt VSS and is on RA. She was started on IV Solumedrol 1 g daily on 9/17. Order was placed for 20 mg propanolol (oral) TID (long-acting medication has red dye, contraindicated since pt has hx of red dye anaphylaxis). Zofran given PRN for nausea.     Morning encounter:   Patient reports slight improvement blurred vision in right eye and pain when looking to the right. She also states that her headache from last night has since resolved. No other pain or acute changes in symptoms.     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 She lives with  and 1 pet dog. Patient has not been sexually active since 2009. Denies recent trauma, travel, acute stressors in home or work life, etc.      Scheduled medications  amitriptyline, 100 mg, oral, Nightly  divalproex, 500 mg, oral, BID  [Held by provider] enoxaparin, 40 mg, subcutaneous, Daily  ezetimibe, 10 mg, oral, Daily  fluticasone furoate-vilanteroL, 1 puff, inhalation, Daily  folic acid, 1 mg, oral, Daily  furosemide, 20 mg, oral, BID  [Held by provider] hydrocortisone, 10 mg, oral, q AM  insulin lispro, 0-10 Units, subcutaneous, TID  lacosamide, 250 mg, oral, BID  levETIRAcetam, 1,500 mg, oral, BID  levothyroxine, 75 mcg, oral, Daily before breakfast  methylPREDNISolone sodium succinate (PF), 1,000 mg, intravenous, q18h  montelukast, " "10 mg, oral, Nightly  propranolol, 20 mg, oral, TID  rOPINIRole, 0.5 mg, oral, q AM  sennosides, 1-2 tablet, oral, Nightly      Continuous medications     PRN medications  PRN medications: [DISCONTINUED] acetaminophen **OR** acetaminophen, dextrose, dextrose, glucagon, glucagon, ipratropium-albuteroL, ondansetron ODT **OR** ondansetron        Objective     Last Recorded Vitals  Blood pressure 111/74, pulse 98, temperature 36.4 °C (97.5 °F), temperature source Temporal, resp. rate 16, height 1.575 m (5' 2\"), weight 118 kg (261 lb), SpO2 95%.    Physical Exam  HENT:      Head: Normocephalic.   Eyes:      General: Lids are normal.      Extraocular Movements: Extraocular movements intact.   Cardiovascular:      Pulses: Normal pulses.      Heart sounds: Normal heart sounds.   Pulmonary:      Effort: Pulmonary effort is normal.      Breath sounds: Normal breath sounds.   Neurological:      Mental Status: She is alert.      Motor: Motor strength is normal.     Deep Tendon Reflexes:      Reflex Scores:       Bicep reflexes are 2+ on the right side and 2+ on the left side.       Brachioradialis reflexes are 2+ on the right side and 2+ on the left side.       Patellar reflexes are 1+ on the right side and 1+ on the left side.       Achilles reflexes are 1+ on the right side and 1+ on the left side.  Psychiatric:         Speech: Speech normal.     Neurological Exam  Mental Status  Alert. Oriented to person, place and time. Recent and remote memory are intact. Speech is normal. Attention and concentration are normal.    Cranial Nerves  CN II: Right visual acuity: Finger movement. Left visual acuity: Finger movement. Right homonymous hemianopsia. Left normal visual field.  CN III, IV, VI: Extraocular movements intact bilaterally. Normal lids and orbits bilaterally.   Right pupil: 3 mm.   Left pupil: 3 mm. Pain endorsed with rightward gaze in right eye..  CN V: Facial sensation is normal.  CN VII: Full and symmetric facial " movement.  CN VIII: Hearing is normal.  CN IX, X: Palate elevates symmetrically  CN XI: Shoulder shrug strength is normal.  CN XII: Tongue midline without atrophy or fasciculations.    Motor  Normal muscle bulk throughout. No fasciculations present. Normal muscle tone. Strength is 5/5 throughout all four extremities.    Sensory  Pinprick is normal in upper and lower extremities. Diminished cold sensation in distal lower extremities.. Vibration abnormality: Decreased vibration in distal lower extremities -- upon re-examination, some vibration intact in upper body but diminished in feet and toes. . Proprioception is normal in upper and lower extremities.     Reflexes                                            Right                      Left  Brachioradialis                    2+                         2+  Biceps                                 2+                         2+  Patellar                                1+                         1+  Achilles                                1+                         1+    Coordination  Right: Finger-to-nose normal. Rapid alternating movement normal. Heel-to-shin normal.    Gait  Normal casual, toe, heel and tandem gait.  Romberg was negative. .    Results from last 72 hours   Lab Units 09/18/24  0920 09/16/24  1934   WBC AUTO x10*3/uL 6.6 5.3   NRBC AUTO /100 WBCs 0.0 0.0   RBC AUTO x10*6/uL 4.47 4.27   HEMOGLOBIN g/dL 12.7 12.0   HEMATOCRIT % 39.4 38.7   MCV fL 88 91   MCH pg 28.4 28.1   MCHC g/dL 32.2 31.0*   RDW % 14.0 14.7*   PLATELETS AUTO x10*3/uL 278 307   NEUTROS PCT AUTO % 88.3 58.4   IG PCT AUTO % 0.5 0.4   LYMPHS PCT AUTO % 10.4 31.1   MONOS PCT AUTO % 0.8 8.8   EOS PCT AUTO % 0.0 0.6   BASOS PCT AUTO % 0.0 0.7   NEUTROS ABS x10*3/uL 5.87 3.12   IG AUTO x10*3/uL 0.03 0.02   LYMPHS ABS AUTO x10*3/uL 0.69* 1.66   MONOS ABS AUTO x10*3/uL 0.05* 0.47   EOS ABS AUTO x10*3/uL 0.00 0.03   BASOS ABS AUTO x10*3/uL 0.00 0.04      Results from last 72 hours   Lab Units  09/18/24  0920 09/17/24  0250 09/16/24 1934   GLUCOSE mg/dL 298*  --  183*   SODIUM mmol/L 133*  --  138   POTASSIUM mmol/L 4.6  --  4.2   CHLORIDE mmol/L 98  --  101   CO2 mmol/L 26  --  29   ANION GAP mmol/L 14  --  12   BUN mg/dL 14  --  15   CREATININE mg/dL 0.87 0.88 0.90   EGFR mL/min/1.73m*2 83 82 80   CALCIUM mg/dL 9.2  --  9.5   PHOSPHORUS mg/dL 2.7  --   --    ALBUMIN g/dL 4.0  --  4.0   MAGNESIUM mg/dL 1.89  --   --       Results from last 72 hours   Lab Units 09/18/24 0920 09/16/24 1934   ALK PHOS U/L  --  74   BILIRUBIN TOTAL mg/dL  --  0.3   PROTEIN TOTAL g/dL  --  8.2   ALT U/L  --  13   AST U/L  --  15   ALBUMIN g/dL 4.0 4.0      Results from last 72 hours   Lab Units 09/17/24  0207   PROTIME seconds 11.9   INR  1.1   APTT seconds 33       MRI Brain w and wo IV contrast, MRI Orbit w and wo IV contrast, MRV w and wo IV contrast 9/17  There is similar mild brain parenchymal volume loss when compared  with 06/05/2024.      An area of encephalomalacia is again noted within the right frontal  lobe convexity. The gradient echo T2 images again demonstrate a small  amount of curvilinear blooming dark signal along the margins of the  area of encephalomalacia suggesting associated hemosiderin deposition  anterior dystrophic calcification/mineralization.      The high-resolution MRI images of the orbits again demonstrate  asymmetric atrophy and increased STIR signal within the left optic  nerve when compared with the right similar when compared with the  prior MRI of the orbits dated 06/05/2024. Again, no corresponding  abnormal enhancement is identified.      The intracranial MRV is within normal limits without evidence of  venous sinus thrombosis.      Assessment/Plan      Assessment & Plan  Vision changes    Jonathan Ayala is a 48 y.o. 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 In terms of etiology for unilateral optic disc edema, atypical optic neuritis remains on the differential vs neuro-retinitis.     #R optic disc edema   #c/f Optic neuritis   ::Follows with Dr Mederos outpatient  ::9/17 Serum toxoplasma IgG noreactive  ::9/18 Syphilis Ab reactive, RPR nonreactive (most likely Ab from IVIG)  - IV Solumedrol 1g daily for three days  - Pending LP     #IIH s/o R frontal bolt 2022 c/b focal epilepsy   #R hemiplegic migraines   - c/w divalproex 500 mg BID   - c/w Lacosamide 250 mg BID   - c/w Keppra 1500 mg BID      #Hypothyroidism   - c/w Levothyroxine 75 mcg daily      #Asthma   - c/w motelukast 10 mg daily   - c/w duonebs PRN   - c/w Breo Ellipta daily      #CVID   #Sjorgen's syndrome   - IVIG infusions every 14 days?, last infusion 09/12     #POTS  - propranolol 20mg TID     #HLD   - c/w Zetia 10 mg daily      #T2DM   - SSI #2 while on steroids, potentially increase further  - Glucose checks AC HS   - hypoglycemia protocol      MISC: c/w ropinirole daily      F: PRN  E: PRN  N: Regular diet (gluten, lactose free)  A: PIV      O2: Room air   Bowel Regimen: Sennokot nightly    DVT ppx: Lovenox, held in anticipation of LP       Code Status: FULL CODE       JOHNY PARISI, MS3   Neurology General    I personally saw, examined, and discussed the patient above. I have reviewed and agree with the excellent medical student note above. All edits or corrections have been made directly into the note, including the physical exam and assessment/plan. Patient was seen, examined, and discussed with the attending.    Shannen Faust MD  Department of Neurology, PGY-2  General [id number]      ----------------------------------------------------------------------------------------------------------------------------------------  ATTENDING ATTESTATION    I saw and evaluated the patient. I personally obtained the key and critical portions of the history and physical exam or was physically present for key and critical portions performed by the resident/fellow. I reviewed the resident/fellow's documentation and discussed the patient with the resident/fellow.       Ciara Elder MD  General Neurology Attending  [facility]

## 2024-09-18 NOTE — CARE PLAN
TCC Called Patient to complete assessment No Answer will attempted again.    Update Patient is having a lumbar Puncture. Tcc Will continue to Follow for Discharge Planning Needs.

## 2024-09-18 NOTE — NURSING NOTE
RN informed Jess Parham MD about the patient's vital signs, heart rate 112, 156/94  patient is asymptomatic.  RN was advised to continue to monitor no interventions.

## 2024-09-18 NOTE — CARE PLAN
Problem: Pain - Adult  Goal: Verbalizes/displays adequate comfort level or baseline comfort level  Outcome: Progressing     Problem: Pain - Adult  Goal: Verbalizes/displays adequate comfort level or baseline comfort level  Outcome: Progressing   The patient's goals for the shift include  help when needed    The clinical goals for the shift include Patient will remain safe and free from injury throughout shift

## 2024-09-18 NOTE — SIGNIFICANT EVENT
Rapid Response RN Note     09/18/24 1842   Onset Documentation   Rapid Response Initiated By RN   Location/Room [facility]  (LT [id number])   Pager Time [phone]   Arrival Time 1842   Event End Time 1848   Primary Reason for Call Staff concern  (concern for sepsis)     Rapid response for concern for sepsis.  Per report, sepsis alert received earlier in the evening.  Patient without complaints at that time.  Patient noted to be flushed this evening.  VS reported upon rapid response team arrival: T 36.4 °C; HR 99; RR 22; /86; SPO2 95% on room air.    Dr. Amol Roberts, DACR at bedside.  Primary Neurology service updated via phone by bedside RN.  Patient denied fever or chills.  Face flushed.  Sepsis workup per Neurology service discretion.    Staff to page rapid response for any concerns or acute change in condition/VS.

## 2024-09-18 NOTE — PROGRESS NOTES
"Jonathan Ayala is a 48 y.o. female on day 1 of admission presenting with Vision changes.      Subjective   Today she feels her vision in the right eye is somewhat better, more clear, with increased area that she can see. She was admitted yesterday and received one dose of IV steroids.       Objective     Last Recorded Vitals  Blood pressure 111/74, pulse 98, temperature 36.4 °C (97.5 °F), temperature source Temporal, resp. rate 16, height 1.575 m (5' 2\"), weight 118 kg (261 lb), SpO2 95%.    Physical Exam  Base Eye Exam       Visual Acuity (Snellen - Linear)         Right Left    Near sc 20/30 PH 20/20-2 20/200 PH 20/70              Pupils         Dark Light Shape React APD    Right 5 3 Round Brisk None    Left 5 3 Round Brisk +rAPD              Visual Fields (Counting fingers)         Left Right                                Neuro/Psych       Oriented x3: Yes    Mood/Affect: Normal                  Additional Tests       Color         Right Left    Ishihara 12/14 0/14                    - Labs: B12 wnl, Folate 1nl, Toxo IgG wnl, Syphilis total antibodies (Ab) reactive  - Pending labs: CNS demyelinating panel, ACE, Rickettsia, Lyme, Bartonella, Vitamin A, Vitamin B1, Tspot, RPR, cerebrospinal fluid (CSF) studies     - MRI Brain and Orbit with and without contrast, MRV:  IMPRESSION:  There is similar mild brain parenchymal volume loss when compared with 06/05/2024.      An area of encephalomalacia is again noted within the right frontal lobe convexity. The gradient echo T2 images again demonstrate a small amount of curvilinear blooming dark signal along the margins of the area of encephalomalacia suggesting associated hemosiderin deposition anterior dystrophic calcification/mineralization.      The high-resolution MRI images of the orbits again demonstrate asymmetric atrophy and increased STIR signal within the left optic nerve when compared with the right similar when compared with the prior MRI of the orbits dated " 06/05/2024. Again, no corresponding abnormal enhancement is identified.      The intracranial MRV is within normal limits without evidence of  venous sinus thrombosis.            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     Seen in clinic with Dr. Mederos on 9/16/24 with new grade 4 right optic disc edema. Differential diagnosis includes sequential non-arteritic anterior ischemic optic neuropathy, but also optic neuritis, both typical and atypical forms, as well as intracranial pressure (ICP) elevation given her history of idiopathic intracranial hypertension, neuro-retinitis. Testing so far without evidence of enhancement of inflammatory lesions, no VST suggesting against inflammatory etiology, however laboratory studies for atypical optic neuritis still pending.     Update 9/18  - Admitted to Neurology, started IV solumedrol 1g q24h (9/17/24)  - Neurology to attempt LP today  - Entrance testing showing improvement in visual acuity today     #Unilateral optic disc edema, right eye  - MRI Brain and orbits and MRV without enhancement of the optic nerve, demyelinating lesions, or VST  - Chest Xray without infiltrate or hilar adenopathy  - Laboratory workup so far unremarkable. Syphilis Ab positive, awaiting RPR and CSF studies  - Recommend Lumbar puncture with opening pressure in the lateral decubitus position with legs extended, protein, glucose & cell count with differential. If opening pressure > 25 cm water, drain to 20 cm water closing pressure up to a maximum of 25 ml CSF drained   - Continue IV methylprednisolone 1g daily with neurology        Ophthalmology to continue to follow while inpatient     Thank you for the consult. Note not final until attending signature. Please contact the Ophthalmology service with further questions or concerns.     Pager: [phone]     Rob Estrada MD  Department of Ophthalmology, PGY-3

## 2024-09-19 ENCOUNTER — PHARMACY VISIT (OUTPATIENT)
Dept: PHARMACY | Facility: CLINIC | Age: 49
End: 2024-09-19
Payer: MEDICAID

## 2024-09-19 ENCOUNTER — APPOINTMENT (OUTPATIENT)
Dept: PRIMARY CARE | Facility: CLINIC | Age: 49
End: 2024-09-19
Payer: MEDICAID

## 2024-09-19 ENCOUNTER — HOSPITAL ENCOUNTER (OUTPATIENT)
Dept: RADIOLOGY | Facility: HOSPITAL | Age: 49
End: 2024-09-19
Payer: MEDICAID

## 2024-09-19 ENCOUNTER — APPOINTMENT (OUTPATIENT)
Dept: ENDOCRINOLOGY | Facility: CLINIC | Age: 49
End: 2024-09-19
Payer: MEDICAID

## 2024-09-19 DIAGNOSIS — S82.51XA CLOSED AVULSION FRACTURE OF MEDIAL MALLEOLUS OF RIGHT TIBIA, INITIAL ENCOUNTER: ICD-10-CM

## 2024-09-19 DIAGNOSIS — M35.01 SJOGREN'S SYNDROME WITH KERATOCONJUNCTIVITIS SICCA (MULTI): ICD-10-CM

## 2024-09-19 DIAGNOSIS — D83.9 CVID (COMMON VARIABLE IMMUNODEFICIENCY): ICD-10-CM

## 2024-09-19 DIAGNOSIS — R56.9 FOCAL SEIZURES (MULTI): ICD-10-CM

## 2024-09-19 LAB
ACE SERPL-CCNC: 41 U/L (ref 16–85)
ALBUMIN SERPL BCP-MCNC: 4.3 G/DL (ref 3.4–5)
ANION GAP SERPL CALC-SCNC: 14 MMOL/L (ref 10–20)
APPEARANCE CSF: CLEAR
BASOPHILS # BLD AUTO: 0 X10*3/UL (ref 0–0.1)
BASOPHILS NFR BLD AUTO: 0 %
BASOPHILS NFR CSF MANUAL: 0 %
BLASTS CSF MANUAL: 0 %
BUN SERPL-MCNC: 22 MG/DL (ref 6–23)
CALCIUM SERPL-MCNC: 9.6 MG/DL (ref 8.6–10.6)
CHLORIDE SERPL-SCNC: 99 MMOL/L (ref 98–107)
CO2 SERPL-SCNC: 26 MMOL/L (ref 21–32)
COLOR CSF: COLORLESS
COLOR SPUN CSF: COLORLESS
CREAT SERPL-MCNC: 0.92 MG/DL (ref 0.5–1.05)
EGFRCR SERPLBLD CKD-EPI 2021: 78 ML/MIN/1.73M*2
EOSINOPHIL # BLD AUTO: 0 X10*3/UL (ref 0–0.7)
EOSINOPHIL NFR BLD AUTO: 0 %
EOSINOPHIL NFR CSF MANUAL: 0 %
ERYTHROCYTE [DISTWIDTH] IN BLOOD BY AUTOMATED COUNT: 14.5 % (ref 11.5–14.5)
GLUCOSE BLD MANUAL STRIP-MCNC: 392 MG/DL (ref 74–99)
GLUCOSE BLD MANUAL STRIP-MCNC: 429 MG/DL (ref 74–99)
GLUCOSE BLD MANUAL STRIP-MCNC: 452 MG/DL (ref 74–99)
GLUCOSE BLD MANUAL STRIP-MCNC: 460 MG/DL (ref 74–99)
GLUCOSE BLD MANUAL STRIP-MCNC: 464 MG/DL (ref 74–99)
GLUCOSE SERPL-MCNC: 401 MG/DL (ref 74–99)
HCT VFR BLD AUTO: 38.8 % (ref 36–46)
HGB BLD-MCNC: 12.6 G/DL (ref 12–16)
IMM GRANULOCYTES # BLD AUTO: 0.06 X10*3/UL (ref 0–0.7)
IMM GRANULOCYTES NFR BLD AUTO: 0.6 % (ref 0–0.9)
IMM GRANULOCYTES NFR CSF: 0 %
LABORATORY COMMENT REPORT: NORMAL
LABORATORY COMMENT REPORT: NORMAL
LYMPHOCYTES # BLD AUTO: 0.76 X10*3/UL (ref 1.2–4.8)
LYMPHOCYTES NFR BLD AUTO: 8 %
LYMPHOCYTES NFR CSF MANUAL: 58 % (ref 28–96)
MAGNESIUM SERPL-MCNC: 2.33 MG/DL (ref 1.6–2.4)
MCH RBC QN AUTO: 28.8 PG (ref 26–34)
MCHC RBC AUTO-ENTMCNC: 32.5 G/DL (ref 32–36)
MCV RBC AUTO: 89 FL (ref 80–100)
MONOCYTES # BLD AUTO: 0.25 X10*3/UL (ref 0.1–1)
MONOCYTES NFR BLD AUTO: 2.6 %
MONOS+MACROS NFR CSF MANUAL: 18 % (ref 16–56)
NEUTROPHILS # BLD AUTO: 8.43 X10*3/UL (ref 1.2–7.7)
NEUTROPHILS NFR BLD AUTO: 88.8 %
NEUTS SEG NFR CSF MANUAL: 24 % (ref 0–5)
NIL(NEG) CONTROL SPOT COUNT: NORMAL
NRBC BLD-RTO: 0 /100 WBCS (ref 0–0)
OTHER CELLS NFR CSF MANUAL: 0 %
PANEL A SPOT COUNT: 0
PANEL B SPOT COUNT: 0
PATH REPORT.FINAL DX SPEC: NORMAL
PATH REPORT.GROSS SPEC: NORMAL
PATH REPORT.RELEVANT HX SPEC: NORMAL
PATH REPORT.TOTAL CANCER: NORMAL
PHOSPHATE SERPL-MCNC: 3.3 MG/DL (ref 2.5–4.9)
PLASMA CELLS NFR CSF MICRO: 0 %
PLATELET # BLD AUTO: 291 X10*3/UL (ref 150–450)
POS CONTROL SPOT COUNT: NORMAL
POTASSIUM SERPL-SCNC: 4.7 MMOL/L (ref 3.5–5.3)
RBC # BLD AUTO: 4.37 X10*6/UL (ref 4–5.2)
RBC # CSF AUTO: 224 /UL (ref 0–5)
SODIUM SERPL-SCNC: 134 MMOL/L (ref 136–145)
T GONDII IGM SER-ACNC: <3 AU/ML
T-SPOT. TB INTERPRETATION: NEGATIVE
TOTAL CELLS COUNTED CSF: 67
TUBE # CSF: ABNORMAL
VDRL CSF-ACNC: NONREACTIVE
WBC # BLD AUTO: 9.5 X10*3/UL (ref 4.4–11.3)
WBC # CSF AUTO: 5 /UL (ref 1–5)

## 2024-09-19 PROCEDURE — 2500000001 HC RX 250 WO HCPCS SELF ADMINISTERED DRUGS (ALT 637 FOR MEDICARE OP)

## 2024-09-19 PROCEDURE — 80069 RENAL FUNCTION PANEL: CPT

## 2024-09-19 PROCEDURE — 2500000002 HC RX 250 W HCPCS SELF ADMINISTERED DRUGS (ALT 637 FOR MEDICARE OP, ALT 636 FOR OP/ED)

## 2024-09-19 PROCEDURE — 2500000004 HC RX 250 GENERAL PHARMACY W/ HCPCS (ALT 636 FOR OP/ED)

## 2024-09-19 PROCEDURE — 94640 AIRWAY INHALATION TREATMENT: CPT

## 2024-09-19 PROCEDURE — 99231 SBSQ HOSP IP/OBS SF/LOW 25: CPT | Performed by: PSYCHIATRY & NEUROLOGY

## 2024-09-19 PROCEDURE — RXMED WILLOW AMBULATORY MEDICATION CHARGE

## 2024-09-19 PROCEDURE — 82947 ASSAY GLUCOSE BLOOD QUANT: CPT

## 2024-09-19 PROCEDURE — 1100000001 HC PRIVATE ROOM DAILY

## 2024-09-19 PROCEDURE — 83735 ASSAY OF MAGNESIUM: CPT

## 2024-09-19 PROCEDURE — 36416 COLLJ CAPILLARY BLOOD SPEC: CPT

## 2024-09-19 PROCEDURE — 85025 COMPLETE CBC W/AUTO DIFF WBC: CPT

## 2024-09-19 RX ORDER — INSULIN GLARGINE 100 [IU]/ML
50 INJECTION, SOLUTION SUBCUTANEOUS EVERY MORNING
Status: DISCONTINUED | OUTPATIENT
Start: 2024-09-19 | End: 2024-09-19

## 2024-09-19 RX ORDER — INSULIN GLARGINE 100 [IU]/ML
50 INJECTION, SOLUTION SUBCUTANEOUS NIGHTLY
Status: DISCONTINUED | OUTPATIENT
Start: 2024-09-19 | End: 2024-09-19

## 2024-09-19 RX ORDER — FUROSEMIDE 20 MG/1
40 TABLET ORAL 2 TIMES DAILY
Status: DISCONTINUED | OUTPATIENT
Start: 2024-09-19 | End: 2024-09-25 | Stop reason: HOSPADM

## 2024-09-19 RX ORDER — INSULIN GLARGINE 100 [IU]/ML
50 INJECTION, SOLUTION SUBCUTANEOUS EVERY MORNING
Status: DISCONTINUED | OUTPATIENT
Start: 2024-09-20 | End: 2024-09-23

## 2024-09-19 RX ORDER — INSULIN LISPRO 100 [IU]/ML
10 INJECTION, SOLUTION INTRAVENOUS; SUBCUTANEOUS ONCE
Status: COMPLETED | OUTPATIENT
Start: 2024-09-19 | End: 2024-09-19

## 2024-09-19 RX ORDER — DEXTROSE 50 % IN WATER (D50W) INTRAVENOUS SYRINGE
25
Status: DISCONTINUED | OUTPATIENT
Start: 2024-09-19 | End: 2024-09-25 | Stop reason: HOSPADM

## 2024-09-19 RX ORDER — BRIMONIDINE TARTRATE 2 MG/ML
1 SOLUTION/ DROPS OPHTHALMIC 2 TIMES DAILY
Status: DISCONTINUED | OUTPATIENT
Start: 2024-09-19 | End: 2024-09-24

## 2024-09-19 RX ORDER — INSULIN GLARGINE 100 [IU]/ML
25 INJECTION, SOLUTION SUBCUTANEOUS ONCE
Status: COMPLETED | OUTPATIENT
Start: 2024-09-19 | End: 2024-09-19

## 2024-09-19 RX ORDER — DEXTROSE 50 % IN WATER (D50W) INTRAVENOUS SYRINGE
12.5
Status: DISCONTINUED | OUTPATIENT
Start: 2024-09-19 | End: 2024-09-25 | Stop reason: HOSPADM

## 2024-09-19 SDOH — ECONOMIC STABILITY: FOOD INSECURITY: WITHIN THE PAST 12 MONTHS, YOU WORRIED THAT YOUR FOOD WOULD RUN OUT BEFORE YOU GOT MONEY TO BUY MORE.: OFTEN TRUE

## 2024-09-19 SDOH — ECONOMIC STABILITY: TRANSPORTATION INSECURITY

## 2024-09-19 SDOH — ECONOMIC STABILITY: INCOME INSECURITY: IN THE LAST 12 MONTHS, WAS THERE A TIME WHEN YOU WERE NOT ABLE TO PAY THE MORTGAGE OR RENT ON TIME?: YES

## 2024-09-19 SDOH — ECONOMIC STABILITY: TRANSPORTATION INSECURITY: IN THE PAST 12 MONTHS, HAS LACK OF TRANSPORTATION KEPT YOU FROM MEDICAL APPOINTMENTS OR FROM GETTING MEDICATIONS?: YES

## 2024-09-19 SDOH — ECONOMIC STABILITY: TRANSPORTATION INSECURITY
IN THE PAST 12 MONTHS, HAS LACK OF TRANSPORTATION KEPT YOU FROM MEETINGS, WORK, OR FROM GETTING THINGS NEEDED FOR DAILY LIVING?: YES

## 2024-09-19 SDOH — ECONOMIC STABILITY: FOOD INSECURITY: WITHIN THE PAST 12 MONTHS, THE FOOD YOU BOUGHT JUST DIDN'T LAST AND YOU DIDN'T HAVE MONEY TO GET MORE.: PATIENT DECLINED

## 2024-09-19 SDOH — ECONOMIC STABILITY: GENERAL

## 2024-09-19 SDOH — ECONOMIC STABILITY: HOUSING INSECURITY: IN THE LAST 12 MONTHS, HOW MANY PLACES HAVE YOU LIVED?: 1

## 2024-09-19 SDOH — ECONOMIC STABILITY: FOOD INSECURITY

## 2024-09-19 SDOH — ECONOMIC STABILITY: HOUSING INSECURITY

## 2024-09-19 SDOH — ECONOMIC STABILITY: HOUSING INSECURITY: IN THE LAST 12 MONTHS, WAS THERE A TIME WHEN YOU WERE NOT ABLE TO PAY THE MORTGAGE OR RENT ON TIME?: YES

## 2024-09-19 SDOH — ECONOMIC STABILITY: FOOD INSECURITY: WITHIN THE PAST 12 MONTHS, YOU WORRIED THAT YOUR FOOD WOULD RUN OUT BEFORE YOU GOT THE MONEY TO BUY MORE.: OFTEN TRUE

## 2024-09-19 SDOH — ECONOMIC STABILITY: HOUSING INSECURITY: IN THE PAST 12 MONTHS HAS THE ELECTRIC, GAS, OIL, OR WATER COMPANY THREATENED TO SHUT OFF SERVICES IN YOUR HOME?: NO

## 2024-09-19 ASSESSMENT — COGNITIVE AND FUNCTIONAL STATUS - GENERAL
MOBILITY SCORE: 24
DAILY ACTIVITIY SCORE: 24

## 2024-09-19 ASSESSMENT — PAIN SCALES - GENERAL
PAINLEVEL_OUTOF10: 0 - NO PAIN
PAINLEVEL_OUTOF10: 0 - NO PAIN

## 2024-09-19 ASSESSMENT — PAIN - FUNCTIONAL ASSESSMENT
PAIN_FUNCTIONAL_ASSESSMENT: 0-10
PAIN_FUNCTIONAL_ASSESSMENT: 0-10

## 2024-09-19 ASSESSMENT — SOCIAL DETERMINANTS OF HEALTH (SDOH): IN THE PAST 12 MONTHS, HAS THE ELECTRIC, GAS, OIL, OR WATER COMPANY THREATENED TO SHUT OFF SERVICE IN YOUR HOME?: NO

## 2024-09-19 ASSESSMENT — ACTIVITIES OF DAILY LIVING (ADL): LACK_OF_TRANSPORTATION: YES

## 2024-09-19 NOTE — PROGRESS NOTES
Met with pt and introduced myself as care coordinator with Care Transitions Team for discharge planning. Pt reports being independent with ADL's for the most part, patient does have a HHA that comes 3X week she requires assistance with cleaning, getting dressed, and ambulation. Pt uses Walker for assistance with ambulation. Pt  also assists. Pt reported no safety concerns at home, she stated 5 falls in last year. Patient is on Waiver Program through [facility] [place].  PCP: Makenna  DATE OF LAST VISIT: 3 Months  PHARMACY: [facility]  MEDICATIONS AFFORDABLE: Yes  FEELS SAFE AT HOME: Yes  RECENT FALLS: 5  EQUIPMENT USED IN HOME:  Shower Chair, Walker, Cane, BSC.  HOME O2/CPAP/NEBS: n/a  DME SUPPLIER: Waiver Program  TRANSPORT HOME:  DIABETIC/SUPPLIES NEEDED: None Needed, [facility]  HD SCHEDULE:  n/a  Homecare: [facility]    Address, phone and emergency contact information verified. All questions and concerns answered. Will continue to follow for discharge needs.

## 2024-09-19 NOTE — NURSING NOTE
1900 received report pt in bed watching TV, no s/s of distress noted at this time. Care is ongoing at this time.    2100 pt tolerated all meds     No significant changes with patient at this time. Patient slept most of this shift.

## 2024-09-19 NOTE — PROGRESS NOTES
"Jonathan Ayala is a 48 y.o. female on day 2 of admission presenting with Vision changes.    Subjective   Jonathan Ayala is a 48 y.o. 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      Interval Updates:  Lumbar puncture performed at bedside 9/18. Rapid response called due to c/f sepsis (due to slight tachycardia?). Patient without symptoms at that time and rapid response called off. NAEON. Blood sugars continue to be elevated.    Morning encounter:   Patient endorses she does not have a headache anymore. Endorses similar vision deficits compared to 9/18. Slight cough this morning (dry). Denies other signs of infection.      Objective     Last Recorded Vitals  Blood pressure 139/86, pulse 96, temperature 36.7 °C (98.1 °F), temperature source Temporal, resp. rate 18, height 1.575 m (5' 2\"), weight 118 kg (261 lb), SpO2 96%.    Physical Exam  HENT:      Head: Normocephalic.   Eyes:      General: Lids are normal.      Extraocular Movements: Extraocular movements intact.   Cardiovascular:      Pulses: Normal pulses.      Heart sounds: Normal heart sounds.   Pulmonary:      Effort: Pulmonary effort is normal.      Breath sounds: Normal breath sounds.   Neurological:      Mental Status: She is alert.      Motor: Motor strength is normal.     Deep Tendon Reflexes:      Reflex Scores:       Bicep reflexes are 2+ on the right side and 2+ on the left side.       Brachioradialis reflexes are 2+ on the right side and 2+ on the left side.       Patellar reflexes are 1+ on the right side and 1+ on the left side.       Achilles reflexes are 1+ on the right side and " 1+ on the left side.  Psychiatric:         Speech: Speech normal.       Neurological Exam  Mental Status  Alert. Oriented to person, place and time. Recent and remote memory are intact. Speech is normal. Attention and concentration are normal.    Cranial Nerves  CN II: Right visual acuity: Finger movement. Left visual acuity: Finger movement. Right homonymous hemianopsia. Left normal visual field.  CN III, IV, VI: Extraocular movements intact bilaterally. Normal lids and orbits bilaterally.   Right pupil: 3 mm.   Left pupil: 3 mm. Pain endorsed with rightward gaze in right eye..  CN V: Facial sensation is normal.  CN VII: Full and symmetric facial movement.  CN VIII: Hearing is normal.  CN IX, X: Palate elevates symmetrically  CN XI: Shoulder shrug strength is normal.  CN XII: Tongue midline without atrophy or fasciculations.  Right eye with blurry vision in the inferior quadrants, improved. Some blurriness in R superior temporal quadrant. Left eye with baseline decreased peripheral visual acuity.    Motor  Normal muscle bulk throughout. No fasciculations present. Normal muscle tone. Strength is 5/5 throughout all four extremities.    Sensory  Pinprick is normal in upper and lower extremities. Diminished cold sensation in distal lower extremities.. Vibration abnormality: Decreased vibration in distal lower extremities -- upon re-examination, some vibration intact in upper body but diminished in feet and toes. . Proprioception is normal in upper and lower extremities.     Reflexes                                            Right                      Left  Brachioradialis                    2+                         2+  Biceps                                 2+                         2+  Patellar                                1+                         1+  Achilles                                1+                         1+    Coordination  Right: Finger-to-nose normal. Rapid alternating movement normal.  Heel-to-shin normal.    Gait  Normal casual, toe, heel and tandem gait.  Romberg was negative. .    Results from last 72 hours   Lab Units 09/19/24  0919 09/18/24  0920   WBC AUTO x10*3/uL 9.5 6.6   NRBC AUTO /100 WBCs 0.0 0.0   RBC AUTO x10*6/uL 4.37 4.47   HEMOGLOBIN g/dL 12.6 12.7   HEMATOCRIT % 38.8 39.4   MCV fL 89 88   MCH pg 28.8 28.4   MCHC g/dL 32.5 32.2   RDW % 14.5 14.0   PLATELETS AUTO x10*3/uL 291 278   NEUTROS PCT AUTO % 88.8 88.3   IG PCT AUTO % 0.6 0.5   LYMPHS PCT AUTO % 8.0 10.4   MONOS PCT AUTO % 2.6 0.8   EOS PCT AUTO % 0.0 0.0   BASOS PCT AUTO % 0.0 0.0   NEUTROS ABS x10*3/uL 8.43* 5.87   IG AUTO x10*3/uL 0.06 0.03   LYMPHS ABS AUTO x10*3/uL 0.76* 0.69*   MONOS ABS AUTO x10*3/uL 0.25 0.05*   EOS ABS AUTO x10*3/uL 0.00 0.00   BASOS ABS AUTO x10*3/uL 0.00 0.00      Results from last 72 hours   Lab Units 09/19/24 0919 09/18/24  0920   GLUCOSE mg/dL 401* 298*   SODIUM mmol/L 134* 133*   POTASSIUM mmol/L 4.7 4.6   CHLORIDE mmol/L 99 98   CO2 mmol/L 26 26   ANION GAP mmol/L 14 14   BUN mg/dL 22 14   CREATININE mg/dL 0.92 0.87   EGFR mL/min/1.73m*2 78 83   CALCIUM mg/dL 9.6 9.2   PHOSPHORUS mg/dL 3.3 2.7   ALBUMIN g/dL 4.3 4.0   MAGNESIUM mg/dL 2.33 1.89      Results from last 72 hours   Lab Units 09/19/24 0919 09/18/24 0920 09/16/24  1934   ALK PHOS U/L  --   --  74   BILIRUBIN TOTAL mg/dL  --   --  0.3   PROTEIN TOTAL g/dL  --   --  8.2   ALT U/L  --   --  13   AST U/L  --   --  15   ALBUMIN g/dL 4.3 4.0 4.0      Results from last 72 hours   Lab Units 09/17/24  0207   PROTIME seconds 11.9   INR  1.1   APTT seconds 33       MRI Brain w and wo IV contrast, MRI Orbit w and wo IV contrast, MRV w and wo IV contrast 9/17  There is similar mild brain parenchymal volume loss when compared  with 06/05/2024.      An area of encephalomalacia is again noted within the right frontal  lobe convexity. The gradient echo T2 images again demonstrate a small  amount of curvilinear blooming dark signal along the  margins of the  area of encephalomalacia suggesting associated hemosiderin deposition  anterior dystrophic calcification/mineralization.      The high-resolution MRI images of the orbits again demonstrate  asymmetric atrophy and increased STIR signal within the left optic  nerve when compared with the right similar when compared with the  prior MRI of the orbits dated 06/05/2024. Again, no corresponding  abnormal enhancement is identified.      The intracranial MRV is within normal limits without evidence of  venous sinus thrombosis.    Scheduled medications  amitriptyline, 100 mg, oral, Nightly  divalproex, 500 mg, oral, BID  enoxaparin, 40 mg, subcutaneous, Daily  ezetimibe, 10 mg, oral, Daily  fluticasone furoate-vilanteroL, 1 puff, inhalation, Daily  folic acid, 1 mg, oral, Daily  furosemide, 20 mg, oral, BID  hydrocortisone, 10 mg, oral, q AM  [START ON 9/20/2024] insulin glargine, 50 Units, subcutaneous, q AM  insulin lispro, 0-10 Units, subcutaneous, TID  lacosamide, 250 mg, oral, BID  levETIRAcetam, 1,500 mg, oral, BID  levothyroxine, 75 mcg, oral, Daily before breakfast  montelukast, 10 mg, oral, Nightly  propranolol, 20 mg, oral, TID  rOPINIRole, 0.5 mg, oral, q AM  sennosides, 1-2 tablet, oral, Nightly      Continuous medications     PRN medications  PRN medications: [DISCONTINUED] acetaminophen **OR** acetaminophen, dextrose, dextrose, glucagon, glucagon, ipratropium-albuteroL, ondansetron ODT **OR** ondansetron      Assessment/Plan      Assessment & Plan  Vision changes    Jonathankaren Ayala is a 48 y.o. 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 . In terms of etiology for  unilateral optic disc edema, atypical optic neuritis remains on the differential vs neuro-retinitis.  Lumbar puncture mostly noticeable for elevated protein and opening pressure of 52, but no pleocytosis with predominant cell type. Headache was greatly improved after LP and vision continues to improve. This makes the differential of IIH more enticing, however autoimmune/oprtic neuritis is still on the differential. Will work on ruling out sarcoidosis in terms of autoimmune causes of decreased vision and discuss with ophthalmology.    #R optic disc edema   #c/f Optic neuritis   ::Follows with Dr Mederos outpatient  ::9/17 Serum toxoplasma IgG noreactive  ::9/18 Syphilis Ab reactive, RPR nonreactive (most likely Ab from IVIG)  ::9/18 LP opening pressure 52, WBC 6 (36% PMNs, 29% lymphocytes), 1000 RBC (Tube 1), protein 79, glucose 154 (POCT glucose >300), closing pressure 22, meningitis panel negative, HSV negative, VDRL nonreactive  - s/p IV Solumedrol 1g daily for three days (last dose 9/19 6AM), will have to determine whether to pursue steroid taper  - follow up CNS demyelinating panel  - follow up rest of LP results  - CT chest w IV contrast to assess for sarcoidosis     #IIH s/o R frontal bolt 2022 c/b focal epilepsy   #R hemiplegic migraines   - c/w divalproex 500 mg BID   - c/w lasix 20mg BID  - c/w Lacosamide 250 mg BID   - c/w Keppra 1500 mg BID      #Hypothyroidism   - c/w Levothyroxine 75 mcg daily      #Asthma   - c/w motelukast 10 mg daily   - c/w duonebs PRN   - c/w Breo Ellipta daily      #CVID   #Sjorgen's syndrome   - IVIG infusions every 14 days?, last infusion 09/12     #POTS  - propranolol 20mg TID     #HLD   - c/w Zetia 10 mg daily      #T2DM   - SSI #2 while on steroids, potentially increase further  - Glucose checks AC HS   - hypoglycemia protocol      MISC: c/w ropinirole daily      F: PRN  E: PRN  N: Regular diet (gluten, lactose free)  A: PIV      O2: Room air   Bowel Regimen: Sennokot nightly     DVT ppx: Lovenox, held in anticipation of LP       Code Status: FULL CODE     Shannen Faust MD  Department of Neurology, PGY-2  General [id number]     Shannen Faust MD    -----------------------------------------------------------------------------------------------------------------------------  ATTENDING ATTESTATION    I saw patient with trainee and agree with the edits, history and exam that I helped formulate per above.      Ciara Elder MD  Neuromuscular Neurology  [facility]  Office Phone Number: [phone]

## 2024-09-19 NOTE — PROCEDURES
Lumbar Puncture    Date/Time: 9/18/2024 2:00 PM    Performed by: Pamela Portillo MD  Authorized by: Ciara Elder MD    Consent:     Consent obtained:  Verbal    Consent given by:  Patient    Risks discussed:  Bleeding, infection, pain, repeat procedure, headache and nerve damage    Alternatives discussed:  Delayed treatment  Universal protocol:     Procedure explained and questions answered to patient or proxy's satisfaction: yes      Relevant documents present and verified: yes      Test results available: yes      Imaging studies available: yes      Required blood products, implants, devices, and special equipment available: yes      Immediately prior to procedure a time out was called: yes      Site/side marked: yes      Patient identity confirmed:  Verbally with patient and arm band  Pre-procedure details:     Procedure purpose:  Diagnostic    Preparation: Patient was prepped and draped in usual sterile fashion    Anesthesia:     Anesthesia method:  None  Procedure details:     Lumbar space:  L3-L4 interspace    Patient position:  L lateral decubitus    Needle gauge:  18    Needle length (in):  5.0    Ultrasound guidance: yes      Ultrasound guidance use: view anatomy of the area      Number of attempts:  2    Fluid appearance:  Clear    Tubes of fluid:  4    Total volume (ml):  23  Post-procedure details:     Puncture site:  Adhesive bandage applied and direct pressure applied    Procedure completion:  Tolerated  Comments:      Opening pressure: 52 mm Hg   Total Volume removed: 23 ml   Closing pressure: 22 mm Hg     Pamela Portillo MD  Department of Neurology, PGY-3

## 2024-09-19 NOTE — PROGRESS NOTES
"Jonathan Ayala is a 48 y.o. female on day 2 of admission presenting with Vision changes.      Subjective   Patient had LP yesterday with neurology and she says since then, her vision in both eyes feels improved.        Objective     Last Recorded Vitals  Blood pressure (!) 167/97, pulse 96, temperature 36.5 °C (97.7 °F), temperature source Temporal, resp. rate 17, height 1.575 m (5' 2\"), weight 118 kg (261 lb), SpO2 96%.    Physical Exam  Base Eye Exam       Visual Acuity (Snellen - Linear)         Right Left    Near sc 20/30 ph 20/20 20/200 PH 20/50-2              Tonometry (Tonopen, 1:37 PM)         Right Left    Pressure 21 23              Pupils         Dark Light Shape React APD    Right 5 3 Round Brisk None    Left 5 3 Round Brisk +rAPD              Visual Fields (Counting fingers)         Left Right                                Extraocular Movement         Right Left     Full, Ortho Full, Ortho              Neuro/Psych       Oriented x3: Yes                  Additional Tests       Color         Right Left    Ishihara 14/14 0/14                    - Labs: B12 wnl, Folate 1nl, Toxo IgG wnl, Syphilis total antibodies (Ab) reactive (negative on repeat), RPR negative,  - LP 9/18/24: Opening pressure 52, WBC 6, 36% neutrophils, 29% lymphocytes, 36% monos. Protein elevated to 79, and elevated glucose 154 (confounded by her elevated POC glucose of 300). ). Oligoclonal bands negative, HSV negative, VDRL negative, meningitis PCR panel negative.  - Pending labs: CNS demyelinating panel, ACE, Rickettsia, Lyme, Bartonella, Vitamin A, Vitamin B1, Tspot, cerebrospinal fluid (CSF) studies     - MRI Brain and Orbit with and without contrast, MRV:  IMPRESSION:  There is similar mild brain parenchymal volume loss when compared with 06/05/2024.      An area of encephalomalacia is again noted within the right frontal lobe convexity. The gradient echo T2 images again demonstrate a small amount of curvilinear blooming dark signal " along the margins of the area of encephalomalacia suggesting associated hemosiderin deposition anterior dystrophic calcification/mineralization.      The high-resolution MRI images of the orbits again demonstrate asymmetric atrophy and increased STIR signal within the left optic nerve when compared with the right similar when compared with the prior MRI of the orbits dated 06/05/2024. Again, no corresponding abnormal enhancement is identified.      The intracranial MRV is within normal limits without evidence of  venous sinus thrombosis.         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     Seen in clinic with Dr. Mederos on 9/16/24 with new grade 4 right optic disc edema. Differential diagnosis includes sequential non-arteritic anterior ischemic optic neuropathy, but also optic neuritis, both typical and atypical forms, as well as intracranial pressure (ICP) elevation given her history of idiopathic intracranial hypertension, neuro-retinitis. Testing so far without evidence of enhancement of inflammatory lesions, no VST suggesting against inflammatory etiology, however laboratory studies for atypical optic neuritis still pending.      Update 9/19  - Lumbar puncture 9/18 with elevated opening pressure to 52 mmHg, elevated protein, with borderline WBC of 6 (no cell type predominant)  - Entrance testing showing stable improvement in visual acuity today     #Unilateral optic disc edema, right eye  - MRI Brain and orbits and MRV without enhancement of the optic nerve, demyelinating lesions, or VST  - Chest Xray without infiltrate or hilar adenopathy  -  Laboratory workup so far unremarkable.  - Lumbar puncture 9/18 with elevated opening pressure to 52 mmHg, elevated protein, with borderline WBC of 6 (no cell type predominant)  - Agree with CT chest to evaluate for sarcoidosis  - Continue IV solumedrol 1g q24h (9/17/24 - ) per neurology  - Increase furosemide to 40mg BID for elevated intracranial pressure (ICP).  - As furosemide is not ideal for treatment of IIH, would recommend allergy consultation for possible desensitization to diamox   - Will discuss option for VPS in this setting of elevated intracranial pressure (ICP) and whether she would be interested in pursuing VPS evaluation     #Ocular hypertension, both eyes  - Possibly related to systemic steroid therapy  - Defer timolol for now in setting of asthma  - Start brimonidine BID, both eyes      Ophthalmology to continue to follow while inpatient     Thank you for the consult. Note not final until attending signature. Please contact the Ophthalmology service with further questions or concerns.     Pager: [phone]     Rob Estrada MD  Department of Ophthalmology, PGY-3     Discussed with attending physician Dr. Mederos

## 2024-09-20 ENCOUNTER — APPOINTMENT (OUTPATIENT)
Dept: RADIOLOGY | Facility: HOSPITAL | Age: 49
End: 2024-09-20
Payer: MEDICAID

## 2024-09-20 LAB
ALBUMIN SERPL BCP-MCNC: 3.9 G/DL (ref 3.4–5)
ANION GAP SERPL CALC-SCNC: 11 MMOL/L (ref 10–20)
ANNOTATION COMMENT IMP: NORMAL
B BURGDOR DNA SPEC QL NAA+PROBE: NOT DETECTED
B HENSELAE IGG TITR SER IF: NORMAL {TITER}
B HENSELAE IGM TITR SER IF: NORMAL {TITER}
BASOPHILS # BLD AUTO: 0.01 X10*3/UL (ref 0–0.1)
BASOPHILS NFR BLD AUTO: 0.1 %
BUN SERPL-MCNC: 24 MG/DL (ref 6–23)
CALCIUM SERPL-MCNC: 9 MG/DL (ref 8.6–10.6)
CELL POPULATIONS: NORMAL
CHLORIDE SERPL-SCNC: 99 MMOL/L (ref 98–107)
CO2 SERPL-SCNC: 29 MMOL/L (ref 21–32)
CREAT SERPL-MCNC: 0.88 MG/DL (ref 0.5–1.05)
DIAGNOSIS: NORMAL
EGFRCR SERPLBLD CKD-EPI 2021: 82 ML/MIN/1.73M*2
EOSINOPHIL # BLD AUTO: 0 X10*3/UL (ref 0–0.7)
EOSINOPHIL NFR BLD AUTO: 0 %
EPSTEIN-BARR VIRUS DNA PCR, QUANTITATIVE  (NON-BLOOD SPECIMEN): NOT DETECTED IU/ML
ERYTHROCYTE [DISTWIDTH] IN BLOOD BY AUTOMATED COUNT: 14.2 % (ref 11.5–14.5)
EST. AVERAGE GLUCOSE BLD GHB EST-MCNC: 160 MG/DL
FLOW DIFFERENTIAL: NORMAL
FLOW TEST ORDERED: NORMAL
FLUID CELL COUNT: 6 /UL
FUNGUS SPEC CULT: NORMAL
FUNGUS SPEC FUNGUS STN: NORMAL
GLUCOSE BLD MANUAL STRIP-MCNC: 139 MG/DL (ref 74–99)
GLUCOSE BLD MANUAL STRIP-MCNC: 235 MG/DL (ref 74–99)
GLUCOSE BLD MANUAL STRIP-MCNC: 267 MG/DL (ref 74–99)
GLUCOSE BLD MANUAL STRIP-MCNC: 339 MG/DL (ref 74–99)
GLUCOSE SERPL-MCNC: 347 MG/DL (ref 74–99)
HBA1C MFR BLD: 7.2 %
HCT VFR BLD AUTO: 35.3 % (ref 36–46)
HGB BLD-MCNC: 11.7 G/DL (ref 12–16)
IMM GRANULOCYTES # BLD AUTO: 0.08 X10*3/UL (ref 0–0.7)
IMM GRANULOCYTES NFR BLD AUTO: 0.8 % (ref 0–0.9)
LAB TEST METHOD: NORMAL
LYMPHOCYTES # BLD AUTO: 1.11 X10*3/UL (ref 1.2–4.8)
LYMPHOCYTES NFR BLD AUTO: 11.6 %
MAGNESIUM SERPL-MCNC: 2.08 MG/DL (ref 1.6–2.4)
MCH RBC QN AUTO: 28.6 PG (ref 26–34)
MCHC RBC AUTO-ENTMCNC: 33.1 G/DL (ref 32–36)
MCV RBC AUTO: 86 FL (ref 80–100)
MONOCYTES # BLD AUTO: 0.56 X10*3/UL (ref 0.1–1)
MONOCYTES NFR BLD AUTO: 5.8 %
NEUTROPHILS # BLD AUTO: 7.82 X10*3/UL (ref 1.2–7.7)
NEUTROPHILS NFR BLD AUTO: 81.7 %
NRBC BLD-RTO: 0 /100 WBCS (ref 0–0)
NUMBER OF CELLS COLLECTED: NORMAL
PATH REPORT.TOTAL CANCER: NORMAL
PHOSPHATE SERPL-MCNC: 2.4 MG/DL (ref 2.5–4.9)
PLATELET # BLD AUTO: 259 X10*3/UL (ref 150–450)
POTASSIUM SERPL-SCNC: 3.8 MMOL/L (ref 3.5–5.3)
R RICKETTSI IGG TITR SER IF: NORMAL {TITER}
R RICKETTSI IGM TITR SER IF: NORMAL {TITER}
RBC # BLD AUTO: 4.09 X10*6/UL (ref 4–5.2)
RETINYL PALMITATE SERPL-MCNC: 0.02 MG/L (ref 0–0.1)
SIGNATURE COMMENT: NORMAL
SODIUM SERPL-SCNC: 135 MMOL/L (ref 136–145)
SPECIMEN SOURCE: NORMAL
SPECIMEN VIABILITY: NORMAL
TOXOPLASMA GONDII PCR, QUANTITATIVE (NON-BLOOD SPECIMEN): NOT DETECTED COPIES/ML
VARICELLA ZOSTER VIRUS DNA PCR, QUANTITATIVE (NON-BLOOD: NOT DETECTED COPIES/ML
VIT A SERPL-MCNC: 0.53 MG/L (ref 0.3–1.2)
WBC # BLD AUTO: 9.6 X10*3/UL (ref 4.4–11.3)

## 2024-09-20 PROCEDURE — 2500000001 HC RX 250 WO HCPCS SELF ADMINISTERED DRUGS (ALT 637 FOR MEDICARE OP)

## 2024-09-20 PROCEDURE — 80069 RENAL FUNCTION PANEL: CPT

## 2024-09-20 PROCEDURE — 94640 AIRWAY INHALATION TREATMENT: CPT

## 2024-09-20 PROCEDURE — 2500000001 HC RX 250 WO HCPCS SELF ADMINISTERED DRUGS (ALT 637 FOR MEDICARE OP): Performed by: STUDENT IN AN ORGANIZED HEALTH CARE EDUCATION/TRAINING PROGRAM

## 2024-09-20 PROCEDURE — 99231 SBSQ HOSP IP/OBS SF/LOW 25: CPT | Performed by: PSYCHIATRY & NEUROLOGY

## 2024-09-20 PROCEDURE — 82947 ASSAY GLUCOSE BLOOD QUANT: CPT

## 2024-09-20 PROCEDURE — 71250 CT THORAX DX C-: CPT

## 2024-09-20 PROCEDURE — 2500000002 HC RX 250 W HCPCS SELF ADMINISTERED DRUGS (ALT 637 FOR MEDICARE OP, ALT 636 FOR OP/ED)

## 2024-09-20 PROCEDURE — 1100000001 HC PRIVATE ROOM DAILY

## 2024-09-20 PROCEDURE — 83735 ASSAY OF MAGNESIUM: CPT

## 2024-09-20 PROCEDURE — 85025 COMPLETE CBC W/AUTO DIFF WBC: CPT

## 2024-09-20 PROCEDURE — 83036 HEMOGLOBIN GLYCOSYLATED A1C: CPT

## 2024-09-20 PROCEDURE — 2500000004 HC RX 250 GENERAL PHARMACY W/ HCPCS (ALT 636 FOR OP/ED)

## 2024-09-20 PROCEDURE — 99254 IP/OBS CNSLTJ NEW/EST MOD 60: CPT | Performed by: INTERNAL MEDICINE

## 2024-09-20 PROCEDURE — 71250 CT THORAX DX C-: CPT | Performed by: RADIOLOGY

## 2024-09-20 RX ORDER — POLYETHYLENE GLYCOL 3350 17 G/17G
17 POWDER, FOR SOLUTION ORAL DAILY
Status: DISCONTINUED | OUTPATIENT
Start: 2024-09-20 | End: 2024-09-25 | Stop reason: HOSPADM

## 2024-09-20 RX ORDER — DEXTROSE 50 % IN WATER (D50W) INTRAVENOUS SYRINGE
12.5
Status: DISCONTINUED | OUTPATIENT
Start: 2024-09-20 | End: 2024-09-25 | Stop reason: HOSPADM

## 2024-09-20 RX ORDER — INSULIN LISPRO 100 [IU]/ML
7 INJECTION, SOLUTION INTRAVENOUS; SUBCUTANEOUS
Status: DISCONTINUED | OUTPATIENT
Start: 2024-09-20 | End: 2024-09-20

## 2024-09-20 RX ORDER — ROPINIROLE 1 MG/1
1 TABLET, FILM COATED ORAL NIGHTLY
Status: DISCONTINUED | OUTPATIENT
Start: 2024-09-20 | End: 2024-09-25 | Stop reason: HOSPADM

## 2024-09-20 RX ORDER — INSULIN LISPRO 100 [IU]/ML
15 INJECTION, SOLUTION INTRAVENOUS; SUBCUTANEOUS
Status: DISCONTINUED | OUTPATIENT
Start: 2024-09-20 | End: 2024-09-22

## 2024-09-20 RX ORDER — CLONAZEPAM 0.5 MG/1
0.5 TABLET ORAL 2 TIMES DAILY
Status: DISCONTINUED | OUTPATIENT
Start: 2024-09-20 | End: 2024-09-25 | Stop reason: HOSPADM

## 2024-09-20 RX ORDER — FLUTICASONE PROPIONATE AND SALMETEROL XINAFOATE 230; 21 UG/1; UG/1
2 AEROSOL, METERED RESPIRATORY (INHALATION)
Status: DISCONTINUED | OUTPATIENT
Start: 2024-09-20 | End: 2024-09-25

## 2024-09-20 ASSESSMENT — COGNITIVE AND FUNCTIONAL STATUS - GENERAL
MOBILITY SCORE: 24
DAILY ACTIVITIY SCORE: 24

## 2024-09-20 ASSESSMENT — PAIN SCALES - GENERAL
PAINLEVEL_OUTOF10: 5 - MODERATE PAIN
PAINLEVEL_OUTOF10: 2
PAINLEVEL_OUTOF10: 5 - MODERATE PAIN

## 2024-09-20 ASSESSMENT — PAIN - FUNCTIONAL ASSESSMENT: PAIN_FUNCTIONAL_ASSESSMENT: 0-10

## 2024-09-20 NOTE — CARE PLAN
Transitional Care Coordinator Note: Patient discussed with medical team, per medical team patient is not medically ready. Going to ICU Monday for desensitization ADOD is 9/24 at earliest Discharge dispo: YUE Nair RN  Transitional Care Coordinator

## 2024-09-20 NOTE — CONSULTS
Inpatient consult to Endocrinology  Consult performed by: Christie Bobby MD  Consult ordered by: Ciara Elder MD  Reason for consult: Inpatient Hyperglycemia Management  Assessment/Recommendations: Inpatient Hyperglycemia Management           Reason For Consult  Inpatient Hyperglycemia Management     History Of Present Illness  Jonathan Ayala is a 48 y.o. F with PMHx T2DM - last A1C 4/24 - 7.9, IIH (dx 2/2022 w/ OP 25) s/p R frontal bolt c/b Tract hemorrhage and SAH and focal epilepsy, Right hemiplegic migraines, CVID on monthly IVIG infusions, Severe Asthma (>childhood), Sjogren's syndrome/scleroderma, POTS, Adrenal Insufficiency - Central known on maintenance HC of 10 mg orally daily, , HTN, MNG with Hypothyroidism on LT4 of 75 mcg orally daily, BRENT on CPAP, Anxiety/depression admitted for a 1 month history of right eye blurred vision and is being treated for 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 Endocrine is consulted on 9/20/24  for management of Inpatient Hyperglycemia.     Hospital course (9/17 - 9/20)   3 Doses of Methylprednisone 1 g given at 4 pm daily -Last dose - 9/18   Plan:   HC 10 mg orally daily - home dose pt.   HC of 10 mg orally daily - resumed during patient's stay.    Diabetes Home Regimen:   Toujeo  50 units AM   Carb Ratio - 1:2 TID (Undercarb estimating however)  Sliding Scale - >200 - 1 unit for every 30 >150/ Sliding Scale of 2 units for every  30 >150 at bedtime.     Hospital Course until 9/20:   Glargine 25 units given at 9/19 and 9/18  First dose of glargine 50 units given on 9/20    Diabetes diagnosis:   2002 -2003    Family Hx: DM 2 - Father and Paternal Cousins    Diabetes Complications:   Gastroparesis/ Peripheral Neuropathy   Denies Hypoglycemic and Hyperglycemic events requiring 3rd party assistance    Home Dietary Snapshot:   Attempts to keep a well balanced diet  Avoids Aspartame in the light of Gastroparesis. However it appears that patient is UnderCarb Estimating.     CGM: Dexcom G7      Previously on Tandem - short period of time- reportedly hypoglycemia.   With Omnipod recently - adhesive rxn.     Previous treatment:   Ozempic with GI side effects on the 2 mg weekly dose - though had been endorsing weight loss was discontinued.  Metformin with GI side effects.    Patient follows with Dr. Felix and team. Last seen on 9/16 -[facility].  Upcoming appointment on 10/10/23 - [facility].     Results from Most Recent A1C  Hemoglobin A1C   Date/Time Value Ref Range Status   04/18/2024 08:19 AM 7.9 (H) see below % Final        Diabetes Problem List Entries with Dates  Problem List:  2023-07: Type 2 diabetes mellitus with diabetic autonomic neuropathy,   with long-term current use of insulin (Multi)  2020-08: Diabetic gastroparesis associated with type 2 diabetes   mellitus (Multi)      Current Facility-Administered Medications:     [DISCONTINUED] acetaminophen (Tylenol) oral liquid 650 mg, 650 mg, oral, q4h PRN **OR** acetaminophen (Tylenol) tablet 650 mg, 650 mg, oral, q4h PRN, Pamela Portillo MD, 650 mg at 09/20/24 0843    amitriptyline (Elavil) tablet 100 mg, 100 mg, oral, Nightly, Pamela Portillo MD, 100 mg at 09/19/24 2153    brimonidine (AlphaGAN) 0.2 % ophthalmic solution 1 drop, 1 drop, Both Eyes, BID, Shannen Faust MD, 1 drop at 09/20/24 0851    clonazePAM (KlonoPIN) tablet 0.5 mg, 0.5 mg, oral, BID, Shannen Faust MD    dextrose 50 % injection 12.5 g, 12.5 g, intravenous, q15 min PRN, Shannen Faust MD    dextrose 50 % injection 12.5 g, 12.5 g, intravenous, q15 min PRN, Shannen Faust MD    dextrose 50 % injection 25 g, 25 g, intravenous, q15 min PRN, Shannen Faust MD    divalproex (Depakote ER) 24 hr tablet 500 mg, 500 mg, oral, BID, Pamela Portillo MD, 500 mg at 09/20/24 0845    enoxaparin (Lovenox) syringe 40 mg, 40 mg, subcutaneous, Daily, Jess Parham MD, 40 mg at 09/20/24 0847    ezetimibe (Zetia) tablet 10 mg, 10 mg, oral, Daily, Pamela Portillo MD, 10 mg at 09/20/24 0845    fluticasone  furoate-vilanteroL (Breo Ellipta) 200-25 mcg/dose inhaler 1 puff, 1 puff, inhalation, Daily, Pamela Portillo MD, 1 puff at 09/18/24 1535    fluticasone propion-salmeteroL (Advair) 230-21 mcg/actuation inhaler 2 puff, 2 puff, inhalation, BID, Shannen Faust MD    folic acid (Folvite) tablet 1 mg, 1 mg, oral, Daily, Pamela Portillo MD, 1 mg at 09/20/24 0844    furosemide (Lasix) tablet 40 mg, 40 mg, oral, BID, Shannen Faust MD, 40 mg at 09/20/24 0844    glucagon (Glucagen) injection 1 mg, 1 mg, intramuscular, q15 min PRN, Shannen Faust MD    glucagon (Glucagen) injection 1 mg, 1 mg, intramuscular, q15 min PRN, Shannen Faust MD    glucagon (Glucagen) injection 1 mg, 1 mg, intramuscular, q15 min PRN, Shannen Faust MD    hydrocortisone (Cortef) tablet 10 mg, 10 mg, oral, q AM, Alan Hernandez MD, 10 mg at 09/20/24 0845    insulin glargine (Lantus) injection 50 Units, 50 Units, subcutaneous, q AM, Shannen Faust MD, 50 Units at 09/20/24 0852    insulin lispro (HumaLOG) injection 0-10 Units, 0-10 Units, subcutaneous, TID, Shannen Faust MD, 8 Units at 09/20/24 0851    insulin lispro (HumaLOG) injection 7 Units, 7 Units, subcutaneous, TID, Shannen Faust MD, 7 Units at 09/20/24 0855    ipratropium-albuteroL (Duo-Neb) 0.5-2.5 mg/3 mL nebulizer solution 3 mL, 3 mL, nebulization, 4x daily PRN, Pamela Portillo MD, 3 mL at 09/20/24 0819    lacosamide (Vimpat) tablet 250 mg, 250 mg, oral, BID, Pamela Portillo MD, 250 mg at 09/20/24 0850    levETIRAcetam (Keppra) tablet 1,500 mg, 1,500 mg, oral, BID, Pamela Portillo MD, 1,500 mg at 09/20/24 0844    levothyroxine (Synthroid, Levoxyl) tablet 75 mcg, 75 mcg, oral, Daily before breakfast, Pamela Portillo MD, 75 mcg at 09/20/24 0633    montelukast (Singulair) tablet 10 mg, 10 mg, oral, Nightly, Pamela Portillo MD, 10 mg at 09/19/24 2135    ondansetron ODT (Zofran-ODT) disintegrating tablet 4 mg, 4 mg, oral, q8h PRN **OR** ondansetron (Zofran) injection 4 mg, 4 mg, intravenous, q8h PRN, Wenceslao Luna MD,  4 mg at 09/17/24 2205    propranolol (Inderal) tablet 20 mg, 20 mg, oral, TID, Shannen Faust MD, 20 mg at 09/20/24 0846    rOPINIRole (Requip) tablet 0.5 mg, 0.5 mg, oral, q AM, Pamela Portillo MD, 0.5 mg at 09/20/24 0846    rOPINIRole (Requip) tablet 1 mg, 1 mg, oral, Nightly, Shannen Faust MD    sennosides (Senokot) tablet 8.6-17.2 mg, 1-2 tablet, oral, Nightly, Pamela Portillo MD, 8.6 mg at 09/19/24 2131   Past Medical History  She has a past medical history of Abnormal findings on diagnostic imaging of other abdominal regions, including retroperitoneum (10/14/2020), Acquired deformity of nose (03/24/2022), Acute upper respiratory infection, unspecified (10/16/2019), Allergy status to unspecified drugs, medicaments and biological substances (05/22/2020), Allergy status to unspecified drugs, medicaments and biological substances (11/13/2020), Benign intracranial hypertension (01/27/2022), Bipolar disorder, unspecified (Multi), Breast calcification, right (08/21/2018), Cellulitis of abdominal wall (09/28/2022), Cervicalgia (07/01/2020), Chronic maxillary sinusitis (01/04/2022), Chronic sialoadenitis (03/16/2020), COVID-19 (01/06/2022), Decreased white blood cell count, unspecified (11/04/2019), Disease of thyroid gland, Disturbances of salivary secretion (03/16/2020), Dry eye syndrome of bilateral lacrimal glands (10/07/2022), Encounter for preprocedural cardiovascular examination (02/01/2022), Food additives allergy status (06/11/2020), Fracture of nasal bones, initial encounter for closed fracture (03/03/2022), Granuloma of right orbit (10/07/2021), History of endometrial ablation (11/09/2017), Hyperlipidemia, Hypertension, Hyperthyroidism, Hypoglycemia, Hypothyroidism, Localized swelling, mass and lump, head (03/24/2022), Major depressive disorder, recurrent, in full remission (CMS-HCC) (10/07/2021), 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), Migraine, Nipple discharge (08/24/2022), Ocular pain, right eye  (10/07/2022), Optic atrophy, Other abnormal and inconclusive findings on diagnostic imaging of breast (07/06/2020), Other anomalies of pupillary function (05/31/2019), Other chest pain (05/18/2020), Other conditions influencing health status (08/01/2022), Other conditions influencing health status (08/03/2021), Other specified disorders of eustachian tube, left ear (11/18/2019), Other specified disorders of nose and nasal sinuses (03/24/2022), Other specified disorders of nose and nasal sinuses (03/24/2022), Pelvic and perineal pain (07/06/2020), Personal history of other diseases of the circulatory system (04/07/2020), Personal history of other diseases of the circulatory system (04/07/2020), Personal history of other diseases of the circulatory system, Personal history of other diseases of the digestive system, Personal history of other diseases of the digestive system (03/02/2020), Personal history of other diseases of the musculoskeletal system and connective tissue (01/19/2022), Personal history of other diseases of the musculoskeletal system and connective tissue (03/02/2021), Personal history of other diseases of the musculoskeletal system and connective tissue (06/16/2020), Personal history of other diseases of the nervous system and sense organs (11/18/2019), Personal history of other diseases of the nervous system and sense organs (09/21/2022), Personal history of other diseases of the respiratory system (04/14/2021), Personal history of other endocrine, nutritional and metabolic disease (02/17/2021), Personal history of other mental and behavioral disorders (05/27/2021), Personal history of other specified conditions (09/07/2022), Personal history of other specified conditions (10/16/2019), Personal history of other specified conditions (09/28/2022), Personal history of other specified conditions (09/16/2021), Personal history of other specified conditions (02/01/2022), Personal history of other specified  conditions (03/09/2022), Personal history of other specified conditions (02/12/2014), Personal history of other specified conditions (10/27/2021), Personal history of other specified conditions (10/16/2019), Personal history of other specified conditions (02/26/2021), Personal history of other specified conditions (02/22/2021), Personal history of urinary calculi, Polycystic ovary syndrome, Postural orthostatic tachycardia syndrome (POTS), Rash and other nonspecific skin eruption (03/15/2022), Repeated falls (06/23/2021), Right lower quadrant pain (10/14/2020), Sjogren syndrome, unspecified (Multi), Sjogren's syndrome (Multi), Slow transit constipation (07/09/2020), Subarachnoid hemorrhage, traumatic (Multi) (04/19/2023), Thyroid nodule, Traumatic subarachnoid hemorrhage with loss of consciousness of unspecified duration, subsequent encounter (03/15/2022), Traumatic subarachnoid hemorrhage without loss of consciousness, subsequent encounter, Type 2 diabetes mellitus (Multi), Unspecified disorder of refraction (10/07/2022), Unspecified optic neuritis (11/06/2020), Unspecified optic neuritis (11/06/2020), Unspecified visual loss (09/25/2019), Venous insufficiency (chronic) (peripheral) (10/18/2021), Viral infection, unspecified (01/11/2022), Vitamin D deficiency, and Vitamin D deficiency, unspecified (09/28/2022).    Surgical History  She has a past surgical history that includes Other surgical history (08/22/2019); Other surgical history (08/22/2019); Other surgical history (08/22/2019); Other surgical history (08/22/2019); Other surgical history (08/22/2019); Other surgical history (08/22/2019); MR angio neck wo IV contrast (02/08/2021); MR angio head wo IV contrast (02/08/2021); [place] tooth extraction (2004); Appendectomy (2017); Hysterectomy (2017); and Hernia repair (Right, 03/01/2024).     Social History  She reports that she has never smoked. She has never used smokeless tobacco. She reports that she does not  currently use alcohol. She reports current drug use. Drug: Benzodiazepines.    Family History  Family History   Problem Relation Name Age of Onset    Other (Perforated bowel) Mother Radha     Hypertension Mother Radha     Macular degeneration Mother Radha     Hyperlipidemia Father Mac     Hypertension Father Mac     Heart attack Father Mac     Other (S/P CABG) Father Mac     Diabetes Father Mac     Prostate cancer Father Mac     Coronary artery disease Father Mac     Heart failure Father Mac     Stroke Father Mac     Cancer Father Mac     Macular degeneration Father Mac     Retinal detachment Father Mac     Stroke Sister Jazmin     Breast cancer Sister Jazmin     Lupus Sister Jazmin     Ovarian cancer Sister Jazmin     Astigmatism Sister Jazmin     Hyperlipidemia Brother Sanchez     Hypertension Brother Sanchez     Astigmatism Brother Sanchez     Diabetes Father's Sister Linda     Blindness Father's Sister Linda     Thyroid cancer Father's Sister Bekah     Thyroid disease Father's Sister Jessica     Colon cancer Father's Brother ?     Blindness Other Multiple     Melanoma Other      Asthma Other      Other (hay fever) Other      Allergies Other      Cancer Maternal Grandmother Cleveland     Cancer Father's Brother Kian         Allergies  Acetazolamide, Atorvastatin, Cefdinir, Ceftriaxone, Doxepin, Duloxetine, Levofloxacin, Levofloxacin in d5w, Nutritional supplements, Ozempic [semaglutide], Prochlorperazine, Red dye, Rosemary, Rosemary oil, Strawberry, Sulfa (sulfonamide antibiotics), Topiramate, Tree pollen-black walnut, Tree pollen-pecan, [place], Aripiprazole, Aspartame, Aspartame (bulk), Fenofibrate, Gluten, Hydrochlorothiazide, Hydromorphone, Iron, Meclizine, Metformin, Propoxyphene, Statins-hmg-coa reductase inhibitors, Tetracyclines, Thiazides, Venlafaxine, Wheat, Adhesive tape-silicones, Barbiturates, Ciprofloxacin, Dhe, Diet foods, Farxiga [dapagliflozin], Ferrous sulfate, Nsaids (non-steroidal  "anti-inflammatory drug), Other, Sulfonylureas, Tobramycin, Vancomycin, Adhesive, Azithromycin, Betamethasone, House dust, Metoclopramide hcl, Prednisone, Propoxyphene-acetaminophen, Sulfacetamide sodium, Txbeesdw-6-yg2 antimigraine agents, and Zolpidem    Review of Systems As above      Physical Exam  Vitals:    09/20/24 0852   BP: 150/88   Pulse: 92   Resp: 16   Temp: 36.2 °C (97.2 °F)   SpO2: 98%   HENT:      Head: Normocephalic.   Eyes:      General: PERRLA, Soft Globe,   Cardiovascular:      Pulses: Normal pulses.      Heart sounds: Normal heart sounds.   Pulmonary:      Effort: Pulmonary effort is normal.      Breath sounds: Normal breath sounds.   Neurological: No delay in relaxation phase  Psychiatric:         Speech: Speech normal.      ROS, PMH, FH/SH, surgical history and allergies have been reviewed.    Last Recorded Vitals  Blood pressure 150/88, pulse 92, temperature 36.2 °C (97.2 °F), temperature source Temporal, resp. rate 16, height 1.575 m (5' 2\"), weight 118 kg (261 lb), SpO2 98%.    Relevant Results  Results from last 7 days   Lab Units 09/20/24  0833 09/20/24  0640 09/19/24  2106 09/19/24  1612 09/19/24  1404 09/19/24  1216 09/19/24  0919 09/18/24  1532 09/18/24  0920 09/17/24  0855 09/16/24  1934   POCT GLUCOSE mg/dL 339*  --  429* 464* 452* 460*  --    < >  --    < >  --    GLUCOSE mg/dL  --  347*  --   --   --   --  401*  --  298*  --  183*    < > = values in this interval not displayed.       Current Facility-Administered Medications:     [DISCONTINUED] acetaminophen (Tylenol) oral liquid 650 mg, 650 mg, oral, q4h PRN **OR** acetaminophen (Tylenol) tablet 650 mg, 650 mg, oral, q4h PRN, Pamela Portillo MD, 650 mg at 09/20/24 0843    amitriptyline (Elavil) tablet 100 mg, 100 mg, oral, Nightly, Pamela Portillo MD, 100 mg at 09/19/24 2153    brimonidine (AlphaGAN) 0.2 % ophthalmic solution 1 drop, 1 drop, Both Eyes, BID, Shannen Faust MD, 1 drop at 09/20/24 3042    clonazePAM (KlonoPIN) tablet 0.5 " mg, 0.5 mg, oral, BID, Shannen Faust MD    dextrose 50 % injection 12.5 g, 12.5 g, intravenous, q15 min PRN, Shannen Faust MD    dextrose 50 % injection 12.5 g, 12.5 g, intravenous, q15 min PRN, Shannen Faust MD    dextrose 50 % injection 25 g, 25 g, intravenous, q15 min PRN, Shannen Faust MD    divalproex (Depakote ER) 24 hr tablet 500 mg, 500 mg, oral, BID, Pamela Portillo MD, 500 mg at 09/20/24 0845    enoxaparin (Lovenox) syringe 40 mg, 40 mg, subcutaneous, Daily, Jess Parham MD, 40 mg at 09/20/24 0847    ezetimibe (Zetia) tablet 10 mg, 10 mg, oral, Daily, Pamela Portillo MD, 10 mg at 09/20/24 0845    fluticasone furoate-vilanteroL (Breo Ellipta) 200-25 mcg/dose inhaler 1 puff, 1 puff, inhalation, Daily, Pamela Portillo MD, 1 puff at 09/18/24 1535    fluticasone propion-salmeteroL (Advair) 230-21 mcg/actuation inhaler 2 puff, 2 puff, inhalation, BID, Shannen Faust MD    folic acid (Folvite) tablet 1 mg, 1 mg, oral, Daily, Pamela Portillo MD, 1 mg at 09/20/24 0844    furosemide (Lasix) tablet 40 mg, 40 mg, oral, BID, Shannen Faust MD, 40 mg at 09/20/24 0844    glucagon (Glucagen) injection 1 mg, 1 mg, intramuscular, q15 min PRN, Shannen Faust MD    glucagon (Glucagen) injection 1 mg, 1 mg, intramuscular, q15 min PRN, Shannen Faust MD    glucagon (Glucagen) injection 1 mg, 1 mg, intramuscular, q15 min PRN, Shannen Faust MD    hydrocortisone (Cortef) tablet 10 mg, 10 mg, oral, q AM, Alan Hernandez MD, 10 mg at 09/20/24 0845    insulin glargine (Lantus) injection 50 Units, 50 Units, subcutaneous, q AM, Shannen Faust MD, 50 Units at 09/20/24 0852    insulin lispro (HumaLOG) injection 0-10 Units, 0-10 Units, subcutaneous, TID, Shannen Faust MD, 8 Units at 09/20/24 0851    insulin lispro (HumaLOG) injection 7 Units, 7 Units, subcutaneous, TID, Shannen Faust MD, 7 Units at 09/20/24 0855    ipratropium-albuteroL (Duo-Neb) 0.5-2.5 mg/3 mL nebulizer solution 3 mL, 3 mL, nebulization, 4x daily PRN, Pamela Portillo MD, 3 mL at  09/20/24 0819    lacosamide (Vimpat) tablet 250 mg, 250 mg, oral, BID, Pamela Portillo MD, 250 mg at 09/20/24 0850    levETIRAcetam (Keppra) tablet 1,500 mg, 1,500 mg, oral, BID, Pamela Portillo MD, 1,500 mg at 09/20/24 0844    levothyroxine (Synthroid, Levoxyl) tablet 75 mcg, 75 mcg, oral, Daily before breakfast, Pamela Portillo MD, 75 mcg at 09/20/24 0633    montelukast (Singulair) tablet 10 mg, 10 mg, oral, Nightly, Pamela Portillo MD, 10 mg at 09/19/24 2138    ondansetron ODT (Zofran-ODT) disintegrating tablet 4 mg, 4 mg, oral, q8h PRN **OR** ondansetron (Zofran) injection 4 mg, 4 mg, intravenous, q8h PRN, Wenceslao Luna MD, 4 mg at 09/17/24 2205    propranolol (Inderal) tablet 20 mg, 20 mg, oral, TID, Shannen Faust MD, 20 mg at 09/20/24 0846    rOPINIRole (Requip) tablet 0.5 mg, 0.5 mg, oral, q AM, Pamela Portillo MD, 0.5 mg at 09/20/24 0846    rOPINIRole (Requip) tablet 1 mg, 1 mg, oral, Nightly, Shannen Faust MD    sennosides (Senokot) tablet 8.6-17.2 mg, 1-2 tablet, oral, Nightly, Pamela Portillo MD, 8.6 mg at 09/19/24 2131   Assessment/Plan   Assessment & Plan  Vision changes  Jonathan Ayala is a 48 y.o. 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 Endocrine is consulted on 9/20/24  for management of Inpatient Hyperglycemia.     Hospital course (9/17 - 9/20)   3 Doses of Methylprednisone 1 g given at 4 pm daily -Last dose - 9/18   Plan:   HC 10 mg orally daily - home dose pt.   HC of 10 mg orally daily - resumed during patient's  stay.    Diabetes Home Regimen:   Toujeo  50 units AM   Carb Ratio - 1:2 TID (Undercarb estimating however)  Sliding Scale - >200 - 1 unit for every 30 >150/ Sliding Scale of 2 units for every  30 >150 at bedtime.     Diabetes diagnosis:   2002 -2003    Family Hx: DM 2 - Father and Paternal Cousins    Diabetes Complications:   Gastroparesis/ Peripheral Neuropathy   Denies Hypoglycemic and Hyperglycemic events requiring 3rd party assistance    Home Dietary Snapshot:   Attempts to keep a well balanced diet  Avoids Aspartame in the light of Gastroparesis. However it appears that patient is UnderCarb Estimating.     CGM: Dexcom G7     Previously on Tandem - short period of time- reportedly hypoglycemia.   With Omnipod recently - adhesive rxn.     Previous treatment:   Ozempic with GI side effects on the 2 mg weekly dose - though had been endorsing weight loss was discontinued.  Metformin with GI side effects.    Patient follows with Dr. Felix and team. [facility] on 9/16 -[facility].  [facility]     # Diabetes Type 2 - with Complications - Gastroparesis and Peripheral Neuropathy  # Insulin Dependent Type 2 Diabetes Mellitus  # Inpatient Hyperglycemia Management in the setting of Methylprednisone administration    Endocrine Recommendations - 9/20/24:     -Please increase Lispro premeals from 7 units to 15 units TID - AC- to hold only for missed meals.   -Please continue with Lantus 50 units once daily in AM at the time being - as patient has just received Lantus at the current dose. Future dosing will depend on her response.     Also, it appears that patient is Undercarb Estimating    Endocrine will continue to Follow.   Hypoglycemia Protocol  Accu-checks TID and at Bedtime  Tentative Date of Discharge: Pending   Plan communicated to primary team  Patient is discussed, seen, and examined with Dr. Campoverde who agrees with the management plan.     Christie Bobby MD

## 2024-09-20 NOTE — CONSULTS
Consults    Reason For Consult  c/f IIH leading to vision loss, allergic to diamox and topomax     History Of Present Illness  Jonathan Ayala is a 48 y.o. 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 The patient reports multiple adverse reactions to a variety of medications, including acetazolamide and topiramate previously prescribed for the management of her IIH. She recalls developing widespread pruritic urticarial rash and shortness of breath approximately 24-48 hours after starting acetazolamide. She reports a similar presentation with multiple other drugs. The patient was last evaluated by an Allergist/Immunologist in 2008 for the management of her CVID for which she receives home infusions every 2 weeks. She reports multiple lab and skin testing for evaluation of food and drug allergies have been unrevelaing, but was informed to avoid foods and medications containing red dye 40.    The patient has required multiple ED visits following anaphylactic-like reactions requiring IM epinephrine. She states she carries her epipen on hand at all times, with her last use earlier this year following consumption of a granola bar. She also reports history of chronic hives with initial onset dating back to 2008. During her last reported anaphylactic reaction, she states she developed throat itchy, throat swelling and widespread hives for which she requiring IM epinephrine x 2 with complete resolution of her symptoms. Additionally, she notes high dose steroid use aggravated her hives.    She denies any associated episodes of angioedema with her reactions to date. No sinopulmonary infections with the past year. Allergy and Immunology consulted for drug allergy evaluation and possible acetazolamide drug  challenge.     Past Medical History  She has a past medical history of Abnormal findings on diagnostic imaging of other abdominal regions, including retroperitoneum (10/14/2020), Acquired deformity of nose (03/24/2022), Acute upper respiratory infection, unspecified (10/16/2019), Allergy status to unspecified drugs, medicaments and biological substances (05/22/2020), Allergy status to unspecified drugs, medicaments and biological substances (11/13/2020), Benign intracranial hypertension (01/27/2022), Bipolar disorder, unspecified (Multi), Breast calcification, right (08/21/2018), Cellulitis of abdominal wall (09/28/2022), Cervicalgia (07/01/2020), Chronic maxillary sinusitis (01/04/2022), Chronic sialoadenitis (03/16/2020), COVID-19 (01/06/2022), Decreased white blood cell count, unspecified (11/04/2019), Disease of thyroid gland, Disturbances of salivary secretion (03/16/2020), Dry eye syndrome of bilateral lacrimal glands (10/07/2022), Encounter for preprocedural cardiovascular examination (02/01/2022), Food additives allergy status (06/11/2020), Fracture of nasal bones, initial encounter for closed fracture (03/03/2022), Granuloma of right orbit (10/07/2021), History of endometrial ablation (11/09/2017), Hyperlipidemia, Hypertension, Hyperthyroidism, Hypoglycemia, Hypothyroidism, Localized swelling, mass and lump, head (03/24/2022), Major depressive disorder, recurrent, in full remission (CMS-[facility]) (10/07/2021), 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), Migraine, Nipple discharge (08/24/2022), Ocular pain, right eye (10/07/2022), Optic atrophy, Other abnormal and inconclusive findings on diagnostic imaging of breast (07/06/2020), Other anomalies of pupillary function (05/31/2019), Other chest pain (05/18/2020), Other conditions influencing health status (08/01/2022), Other conditions influencing health status (08/03/2021), Other specified disorders of eustachian tube, left ear (11/18/2019), Other specified  disorders of nose and nasal sinuses (03/24/2022), Other specified disorders of nose and nasal sinuses (03/24/2022), Pelvic and perineal pain (07/06/2020), Personal history of other diseases of the circulatory system (04/07/2020), Personal history of other diseases of the circulatory system (04/07/2020), Personal history of other diseases of the circulatory system, Personal history of other diseases of the digestive system, Personal history of other diseases of the digestive system (03/02/2020), Personal history of other diseases of the musculoskeletal system and connective tissue (01/19/2022), Personal history of other diseases of the musculoskeletal system and connective tissue (03/02/2021), Personal history of other diseases of the musculoskeletal system and connective tissue (06/16/2020), Personal history of other diseases of the nervous system and sense organs (11/18/2019), Personal history of other diseases of the nervous system and sense organs (09/21/2022), Personal history of other diseases of the respiratory system (04/14/2021), Personal history of other endocrine, nutritional and metabolic disease (02/17/2021), Personal history of other mental and behavioral disorders (05/27/2021), Personal history of other specified conditions (09/07/2022), Personal history of other specified conditions (10/16/2019), Personal history of other specified conditions (09/28/2022), Personal history of other specified conditions (09/16/2021), Personal history of other specified conditions (02/01/2022), Personal history of other specified conditions (03/09/2022), Personal history of other specified conditions (02/12/2014), Personal history of other specified conditions (10/27/2021), Personal history of other specified conditions (10/16/2019), Personal history of other specified conditions (02/26/2021), Personal history of other specified conditions (02/22/2021), Personal history of urinary calculi, Polycystic ovary syndrome,  Postural orthostatic tachycardia syndrome (POTS), Rash and other nonspecific skin eruption (03/15/2022), Repeated falls (06/23/2021), Right lower quadrant pain (10/14/2020), Sjogren syndrome, unspecified (Multi), Sjogren's syndrome (Multi), Slow transit constipation (07/09/2020), Subarachnoid hemorrhage, traumatic (Multi) (04/19/2023), Thyroid nodule, Traumatic subarachnoid hemorrhage with loss of consciousness of unspecified duration, subsequent encounter (03/15/2022), Traumatic subarachnoid hemorrhage without loss of consciousness, subsequent encounter, Type 2 diabetes mellitus (Multi), Unspecified disorder of refraction (10/07/2022), Unspecified optic neuritis (11/06/2020), Unspecified optic neuritis (11/06/2020), Unspecified visual loss (09/25/2019), Venous insufficiency (chronic) (peripheral) (10/18/2021), Viral infection, unspecified (01/11/2022), Vitamin D deficiency, and Vitamin D deficiency, unspecified (09/28/2022).    Surgical History  She has a past surgical history that includes Other surgical history (08/22/2019); Other surgical history (08/22/2019); Other surgical history (08/22/2019); Other surgical history (08/22/2019); Other surgical history (08/22/2019); Other surgical history (08/22/2019); MR angio neck wo IV contrast (02/08/2021); MR angio head wo IV contrast (02/08/2021); [place] tooth extraction (2004); Appendectomy (2017); Hysterectomy (2017); and Hernia repair (Right, 03/01/2024).     Social History  She reports that she has never smoked. She has never used smokeless tobacco. She reports that she does not currently use alcohol. She reports current drug use. Drug: Benzodiazepines.    Family History  Family History   Problem Relation Name Age of Onset    Other (Perforated bowel) Mother Radha     Hypertension Mother Radha     Macular degeneration Mother Radha     Hyperlipidemia Father Mac     Hypertension Father Mac     Heart attack Father Mac     Other (S/P CABG) Father Mac      Diabetes Father Mac     Prostate cancer Father Mac     Coronary artery disease Father Mac     Heart failure Father Mac     Stroke Father Mac     Cancer Father Mac     Macular degeneration Father Mac     Retinal detachment Father Mac     Stroke Sister Jazmin     Breast cancer Sister Jazmin     Lupus Sister Jazmin     Ovarian cancer Sister Jazmin     Astigmatism Sister Jazmin     Hyperlipidemia Brother Sanchez     Hypertension Brother Sanchez     Astigmatism Brother Sanchez     Diabetes Father's Sister Linda     Blindness Father's Sister Linda     Thyroid cancer Father's Sister Bekah     Thyroid disease Father's Sister Jessica     Colon cancer Father's Brother ?     Blindness Other Multiple     Melanoma Other      Asthma Other      Other (hay fever) Other      Allergies Other      Cancer Maternal Grandmother Brenda     Cancer Father's Brother Kian         Allergies  Acetazolamide, Atorvastatin, Cefdinir, Ceftriaxone, Doxepin, Duloxetine, Levofloxacin, Levofloxacin in d5w, Nutritional supplements, Ozempic [semaglutide], Prochlorperazine, Red dye, Rosemary, Rosemary oil, Strawberry, Sulfa (sulfonamide antibiotics), Topiramate, Tree pollen-black walnut, Tree pollen-pecan, Caney, Aripiprazole, Aspartame, Aspartame (bulk), Fenofibrate, Gluten, Hydrochlorothiazide, Hydromorphone, Iron, Meclizine, Metformin, Propoxyphene, Statins-hmg-coa reductase inhibitors, Tetracyclines, Thiazides, Venlafaxine, Wheat, Adhesive tape-silicones, Barbiturates, Ciprofloxacin, Dhe, Diet foods, Farxiga [dapagliflozin], Ferrous sulfate, Nsaids (non-steroidal anti-inflammatory drug), Other, Sulfonylureas, Tobramycin, Vancomycin, Adhesive, Azithromycin, Betamethasone, House dust, Metoclopramide hcl, Prednisone, Propoxyphene-acetaminophen, Sulfacetamide sodium, Yxytousn-0-vx6 antimigraine agents, and Zolpidem       Vital signs:  /85 (BP Location: Right arm, Patient Position: Sitting)   Pulse 97   Temp 35.3 °C (95.5 °F) (Temporal)   Resp 18   Ht  "1.575 m (5' 2\")   Wt 118 kg (261 lb)   SpO2 96%   BMI 47.74 kg/m²     Physical Exam:  GENERAL: Alert, oriented and in no acute distress.     HEENT: EYES: No conjunctival injection or cobblestoning. Nose: nasal turbinates mildly edematous and are not boggy.  There is no mucous stranding, polyps, or blood    noted. EARS: Tympanic membranes are clear. MOUTH: moist and pink with no exudates, ulcers, or thrush. NECK: is supple, without adenopathy.  No upper airway stridor noted.       HEART: regular rate and rhythm.       LUNGS: Clear to auscultation bilaterally. No wheezing, rhonchi or rales.        ABDOMEN: Positive bowel sounds, soft, nontender, nondistended.       EXTREMITIES: No clubbing or edema.        NEURO:  Normal affect.  Gait normal.      SKIN: No rash, hives, or angioedema noted       Last Recorded Vitals  /85 (BP Location: Right arm, Patient Position: Sitting)   Pulse 97   Temp 35.3 °C (95.5 °F) (Temporal)   Resp 18   Wt 118 kg (261 lb)   SpO2 96%        Assessment/Plan     The patient is a 48 year-old female with multiple drug reactions, previously labelled with chronic idiopathic urticaria and idiopathic anaphylaxis requiring multiple IM epinephrine. Given patient's immunocompromised status, the patient is high risk for developing infection with VPS. At this time, do not recommend oral drug challenge as patient is moderate to high risk for developing reaction, therefore recommend proceeding with specific drug desensitization protocol.     Our team will work alongside pharmacy to develop appropriate acetazolamide desensitization protocol to have in place. Protocol will need to take place in ICU setting for close monitoring with dedicated nurse. Discussed with patient likely need for desensitization. All questions and concerns addressed with patient who consents to proceeding with acetazolamide desensitization when able. Primary team informed if unable to maintain maintenance dose following " successfully passing desensitization, patient will require another desensitization if exceeds 48 hour window period. Neurology team anticipates maintenance dose to be diamox 500 mg orally twice daily.  Recommend the following PRN medications to be made available at bedside prior to starting desensitization: epinephrine 0.3 mg (1:1000) IM PRN for anaphylaxis, solumedrol 40 mg IV, diphenhydramine 50 mg IV, famotidine 20 mg IV.    Additionally, strongly recommend the patient establish with an Allergist/Immunologist nearby her hometown in [place] for regular monitoring. She will require lab monitoring (CBC with diff, CMP, Serum Immunoglobulins IgG, IgA, IgM, ESR, CRP) every 3 months and HRCT Chest every 3 years.    Also, while inpatient, recommend checking baseline serum tryptase level. Consider providing a standing script to have on hand to check tryptase level during her anaphylactic-like episodes given her multiple episodes of poorly described triggers for her anaphylaxis requiring ER care and multiple epinephrine.    Await scheduling of desensitization pending ICU bed availability which is anticipated early next week.       Derrick Sanchez MD  Allergy & Immunology Fellow, PGY-5  Pager: [phone]  Haiku preferred

## 2024-09-20 NOTE — CARE PLAN
Problem: Safety - Adult  Goal: Free from fall injury  Outcome: Progressing     Problem: Discharge Planning  Goal: Discharge to home or other facility with appropriate resources  Outcome: Progressing     Problem: Chronic Conditions and Co-morbidities  Goal: Patient's chronic conditions and co-morbidity symptoms are monitored and maintained or improved  Outcome: Progressing   The patient's goals for the shift include      The clinical goals for the shift include pt will remain stable throughout shft

## 2024-09-20 NOTE — PROGRESS NOTES
"Jonathan Ayala is a 48 y.o. female on day 3 of admission presenting with Vision changes.    Subjective   Jonathan Ayala is a 48 y.o. 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      Interval Updates:  Sugars continue to be elevated, given extra doses of short acting insulin.    Morning encounter:   Patient endorses a slight headache this morning and some pain when looking the the horizontal extremes (right eye). Sore throat due to not having normal home inhalers. Denies any infectious symptoms.      Objective     Last Recorded Vitals  Blood pressure 150/88, pulse 92, temperature 36.2 °C (97.2 °F), temperature source Temporal, resp. rate 16, height 1.575 m (5' 2\"), weight 118 kg (261 lb), SpO2 98%.    Physical Exam  HENT:      Head: Normocephalic.   Eyes:      General: Lids are normal.      Extraocular Movements: Extraocular movements intact.   Cardiovascular:      Pulses: Normal pulses.      Heart sounds: Normal heart sounds.   Pulmonary:      Effort: Pulmonary effort is normal.      Breath sounds: Normal breath sounds.   Neurological:      Mental Status: She is alert.      Motor: Motor strength is normal.     Deep Tendon Reflexes:      Reflex Scores:       Bicep reflexes are 2+ on the right side and 2+ on the left side.       Brachioradialis reflexes are 2+ on the right side and 2+ on the left side.       Patellar reflexes are 1+ on the right side and 1+ on the left side.       Achilles reflexes are 1+ on the right side and 1+ on the left side.  Psychiatric:         Speech: Speech normal.       Neurological Exam  Mental Status  Alert. Oriented to " person, place and time. Recent and remote memory are intact. Speech is normal. Attention and concentration are normal.    Cranial Nerves  CN II: Right visual acuity: Finger movement. Left visual acuity: Finger movement. Right homonymous hemianopsia. Left normal visual field.  CN III, IV, VI: Extraocular movements intact bilaterally. Normal lids and orbits bilaterally.   Right pupil: 3 mm.   Left pupil: 3 mm. Pain endorsed with rightward gaze in right eye..  CN V: Facial sensation is normal.  CN VII: Full and symmetric facial movement.  CN VIII: Hearing is normal.  CN IX, X: Palate elevates symmetrically  CN XI: Shoulder shrug strength is normal.  CN XII: Tongue midline without atrophy or fasciculations.  Right eye with blurry vision in the nasal inferior. Some blurriness in R superior temporal quadrant. Left eye with baseline decreased peripheral visual acuity. Pain in the right eye when looking to the horizontal extremes.    Motor  Normal muscle bulk throughout. No fasciculations present. Normal muscle tone. Strength is 5/5 throughout all four extremities.    Sensory  Pinprick is normal in upper and lower extremities. Diminished cold sensation in distal lower extremities.. Vibration abnormality: Decreased vibration in distal lower extremities -- upon re-examination, some vibration intact in upper body but diminished in feet and toes. . Proprioception is normal in upper and lower extremities.     Reflexes                                            Right                      Left  Brachioradialis                    2+                         2+  Biceps                                 2+                         2+  Patellar                                1+                         1+  Achilles                                1+                         1+    Coordination  Right: Finger-to-nose normal. Rapid alternating movement normal. Heel-to-shin normal.    Gait  Normal casual, toe, heel and tandem gait.  Romberg was  negative. .    Results from last 72 hours   Lab Units 09/20/24  0640 09/19/24  0919   WBC AUTO x10*3/uL 9.6 9.5   NRBC AUTO /100 WBCs 0.0 0.0   RBC AUTO x10*6/uL 4.09 4.37   HEMOGLOBIN g/dL 11.7* 12.6   HEMATOCRIT % 35.3* 38.8   MCV fL 86 89   MCH pg 28.6 28.8   MCHC g/dL 33.1 32.5   RDW % 14.2 14.5   PLATELETS AUTO x10*3/uL 259 291   NEUTROS PCT AUTO % 81.7 88.8   IG PCT AUTO % 0.8 0.6   LYMPHS PCT AUTO % 11.6 8.0   MONOS PCT AUTO % 5.8 2.6   EOS PCT AUTO % 0.0 0.0   BASOS PCT AUTO % 0.1 0.0   NEUTROS ABS x10*3/uL 7.82* 8.43*   IG AUTO x10*3/uL 0.08 0.06   LYMPHS ABS AUTO x10*3/uL 1.11* 0.76*   MONOS ABS AUTO x10*3/uL 0.56 0.25   EOS ABS AUTO x10*3/uL 0.00 0.00   BASOS ABS AUTO x10*3/uL 0.01 0.00      Results from last 72 hours   Lab Units 09/20/24  0640 09/19/24  0919   GLUCOSE mg/dL 347* 401*   SODIUM mmol/L 135* 134*   POTASSIUM mmol/L 3.8 4.7   CHLORIDE mmol/L 99 99   CO2 mmol/L 29 26   ANION GAP mmol/L 11 14   BUN mg/dL 24* 22   CREATININE mg/dL 0.88 0.92   EGFR mL/min/1.73m*2 82 78   CALCIUM mg/dL 9.0 9.6   PHOSPHORUS mg/dL 2.4* 3.3   ALBUMIN g/dL 3.9 4.3   MAGNESIUM mg/dL 2.08 2.33      Results from last 72 hours   Lab Units 09/20/24  0640 09/19/24  0919   ALBUMIN g/dL 3.9 4.3              MRI Brain w and wo IV contrast, MRI Orbit w and wo IV contrast, MRV w and wo IV contrast 9/17  There is similar mild brain parenchymal volume loss when compared  with 06/05/2024.      An area of encephalomalacia is again noted within the right frontal  lobe convexity. The gradient echo T2 images again demonstrate a small  amount of curvilinear blooming dark signal along the margins of the  area of encephalomalacia suggesting associated hemosiderin deposition  anterior dystrophic calcification/mineralization.      The high-resolution MRI images of the orbits again demonstrate  asymmetric atrophy and increased STIR signal within the left optic  nerve when compared with the right similar when compared with the  prior MRI of  the orbits dated 06/05/2024. Again, no corresponding  abnormal enhancement is identified.      The intracranial MRV is within normal limits without evidence of  venous sinus thrombosis.    Scheduled medications  amitriptyline, 100 mg, oral, Nightly  brimonidine, 1 drop, Both Eyes, BID  clonazePAM, 0.5 mg, oral, BID  divalproex, 500 mg, oral, BID  enoxaparin, 40 mg, subcutaneous, Daily  ezetimibe, 10 mg, oral, Daily  fluticasone furoate-vilanteroL, 1 puff, inhalation, Daily  fluticasone propion-salmeteroL, 2 puff, inhalation, BID  folic acid, 1 mg, oral, Daily  furosemide, 40 mg, oral, BID  hydrocortisone, 10 mg, oral, q AM  insulin glargine, 50 Units, subcutaneous, q AM  insulin lispro, 0-10 Units, subcutaneous, TID  insulin lispro, 7 Units, subcutaneous, TID  lacosamide, 250 mg, oral, BID  levETIRAcetam, 1,500 mg, oral, BID  levothyroxine, 75 mcg, oral, Daily before breakfast  montelukast, 10 mg, oral, Nightly  polyethylene glycol, 17 g, oral, Daily  propranolol, 20 mg, oral, TID  rOPINIRole, 0.5 mg, oral, q AM  rOPINIRole, 1 mg, oral, Nightly  sennosides, 1-2 tablet, oral, Nightly      Continuous medications     PRN medications  PRN medications: [DISCONTINUED] acetaminophen **OR** acetaminophen, dextrose, dextrose, dextrose, glucagon, glucagon, glucagon, ipratropium-albuteroL, ondansetron ODT **OR** ondansetron      Assessment/Plan      Assessment & Plan  Vision changes    Jonathan Ayala is a 48 y.o. 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 . In terms of etiology for unilateral optic disc edema, atypical optic neuritis remains on the differential vs neuro-retinitis.  Lumbar  puncture mostly noticeable for elevated protein and opening pressure of 52, but no pleocytosis with predominant cell type. Headache was greatly improved after LP and vision continues to improve. This makes the differential of IIH more enticing, however autoimmune/oprtic neuritis is still on the differential. Will work on ruling out sarcoidosis in terms of autoimmune causes of decreased vision and discuss with ophthalmology.    #R optic disc edema   #c/f Optic neuritis vs IIH  ::Follows with Dr Mederos outpatient  ::9/17 Serum toxoplasma IgG noreactive  ::9/18 Syphilis Ab reactive, RPR nonreactive (most likely Ab from IVIG)  ::9/18 LP opening pressure 52, WBC 6 (36% PMNs, 29% lymphocytes), 1000 RBC (Tube 1), protein 79, glucose 154 (POCT glucose >300), closing pressure 22, meningitis panel negative, HSV negative, VDRL nonreactive, cytology without malignant cells, flow cytometry without discrete population of B cells  - s/p IV Solumedrol 1g daily for three days (last dose 9/19 6AM)  - follow up CNS demyelinating panel  - follow up rest of LP results  - CT chest w IV contrast to assess for sarcoidosis  - consulted NSGY to discuss risk/benefits of VPS  - Allergy: planning for admission to ICU/NSU for Diamox desensitization     #IIH s/o R frontal bolt 2022 c/b focal epilepsy   #R hemiplegic migraines   - c/w divalproex 500 mg BID   - c/w lasix 40mg BID  - c/w Lacosamide 250 mg BID   - c/w Keppra 1500 mg BID      #Hypothyroidism   - c/w Levothyroxine 75 mcg daily      #Asthma   - c/w motelukast 10 mg daily   - c/w duonebs PRN   - c/w Breo Ellipta daily      #CVID   #Sjorgen's syndrome   - IVIG infusions every 14 days?, last infusion 09/12     #POTS  - propranolol 20mg TID     #HLD   - c/w Zetia 10 mg daily      #T2DM   - lantus 50 qAM, lispro 7 TID  - Endocrine consulted, appreciate recs  - Glucose checks AC HS   - hypoglycemia protocol      MISC: c/w ropinirole BID (0.5mg qAM, 1mg qPM)      F: PRN  E: PRN  N: Regular  diet (gluten, lactose free)  A: PIV      O2: Room air   Bowel Regimen: Sennokot nightly    DVT ppx: Lovenox, held in anticipation of LP       Code Status: FULL CODE     Shannen Faust MD  Department of Neurology, PGY-2  General [id number]     Shannen Faust MD    -----------------------------------------------------------------------------------------------------------------------------  ATTENDING ATTESTATION    I saw patient with trainee and agree with the edits, history and exam that I helped formulate per above.      Ciara Elder MD  Neuromuscular Neurology  [facility]  Office Phone Number: [phone]

## 2024-09-20 NOTE — ASSESSMENT & PLAN NOTE
Jonathan Ayala is a 48 y.o. 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 Endocrine is consulted on 9/20/24  for management of Inpatient Hyperglycemia.     Hospital course (9/17 - 9/20)   3 Doses of Methylprednisone 1 g given at 4 pm daily -Last dose - 9/18   Plan:   HC 10 mg orally daily - home dose pt.   HC of 10 mg orally daily - resumed during patient's stay.    Diabetes Home Regimen:   Toujeo  50 units AM   Carb Ratio - 1:2 TID (Undercarb estimating however)  Sliding Scale - >200 - 1 unit for every 30 >150/ Sliding Scale of 2 units for every  30 >150 at bedtime.     Diabetes diagnosis:   2002 -2003    Family Hx: DM 2 - Father and Paternal Cousins    Diabetes Complications:   Gastroparesis/ Peripheral Neuropathy   Denies Hypoglycemic and Hyperglycemic events requiring 3rd party assistance    Home Dietary Snapshot:   Attempts to keep a well balanced diet  Avoids Aspartame in the light of Gastroparesis. However it appears that patient is UnderCarb Estimating.     CGM: Dexcom G7     Previously on Tandem - short period of time- reportedly hypoglycemia.   With Omnipod recently - adhesive rxn.     Previous treatment:   Ozempic with GI side effects on the 2 mg weekly dose - though had been endorsing weight loss was discontinued.  Metformin with GI side effects.    Patient follows with Dr. Felix and team. Last seen on 9/16 -Fetharleyer.  Upcoming appointment on 10/10/23 - Stefany.     # Diabetes Type 2 - with Complications -  Gastroparesis and Peripheral Neuropathy  # Insulin Dependent Type 2 Diabetes Mellitus  # Inpatient Hyperglycemia Management in the setting of Methylprednisone administration

## 2024-09-20 NOTE — CARE PLAN
The patient's goals for the shift include      The clinical goals for the shift include pt remain sfe and free from falls thgoughout shfit. BSand BP remain within pt goal

## 2024-09-20 NOTE — PROGRESS NOTES
"Jonathan Ayala is a 48 y.o. female on day 3 of admission presenting with Vision changes.      Subjective   Today she feels her right eye has some pain on lateral movement since starting the brimonidine drops. She also notices she has floaters in the right eye associated with severe coughing spells she was having.        Objective     Last Recorded Vitals  Blood pressure 131/85, pulse 97, temperature 35.3 °C (95.5 °F), temperature source Temporal, resp. rate 18, height 1.575 m (5' 2\"), weight 118 kg (261 lb), SpO2 96%.    Physical Exam  Base Eye Exam       Visual Acuity (Snellen - Linear)         Right Left    Near sc 20/30+3 ph 20/20 20/200 PH 20/70-1              Tonometry (Tonopen, 6:10 PM)         Right Left    Pressure 20 20              Pupils         Dark Light Shape React APD    Right 5 3 Round Brisk None    Left 5 3 Round Brisk +rAPD              Visual Fields (Counting fingers)         Left Right                                Extraocular Movement         Right Left     Full, Ortho Full, Ortho              Neuro/Psych       Oriented x3: Yes    Mood/Affect: Normal                  Additional Tests       Color         Right Left    Ishihara 14/14 0/14                  Slit Lamp and Fundus Exam       External Exam         Right Left    External Normal Normal              Slit Lamp Exam         Right Left    Lids/Lashes Normal Normal    Conjunctiva/Sclera White and quiet White and quiet    Cornea Clear Clear    Anterior Chamber Deep and quiet Deep and quiet    Iris Round and reactive Round and reactive    Lens Clear Clear    Anterior Vitreous Normal Normal              Fundus Exam         Right Left    Disc Edema grade 3 with pallor     C/D Ratio obscured     Macula Normal     Vessels Normal     Periphery Normal                   - Labs: B12 wnl, Folate 1nl, Toxo IgG wnl, Syphilis total antibodies (Ab) reactive (negative on repeat), RPR negative, Rickettsia wnl, Lyme wnl, Bartonella wnl, T spot negative, ACE " wnl,   - LP 9/18/24: Opening pressure 52, WBC 6, 36% neutrophils, 29% lymphocytes, 36% monos. Protein elevated to 79, and elevated glucose 154 (confounded by her elevated POC glucose of 300). ). Oligoclonal bands negative, HSV negative, VDRL negative, meningitis PCR panel negative, cytology normal,   - Pending labs: CNS demyelinating panel, Vitamin A, Vitamin B1, cerebrospinal fluid (CSF) studies     - MRI Brain and Orbit with and without contrast, MRV:  IMPRESSION:  There is similar mild brain parenchymal volume loss when compared with 06/05/2024.      An area of encephalomalacia is again noted within the right frontal lobe convexity. The gradient echo T2 images again demonstrate a small amount of curvilinear blooming dark signal along the margins of the area of encephalomalacia suggesting associated hemosiderin deposition anterior dystrophic calcification/mineralization.      The high-resolution MRI images of the orbits again demonstrate asymmetric atrophy and increased STIR signal within the left optic nerve when compared with the right similar when compared with the prior MRI of the orbits dated 06/05/2024. Again, no corresponding abnormal enhancement is identified.      The intracranial MRV is within normal limits without evidence of  venous sinus thrombosis.    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     Seen in clinic with Dr. Mederos on 9/16/24 with new grade 4 right optic disc edema. Differential diagnosis includes sequential non-arteritic anterior ischemic optic neuropathy, but also optic neuritis, both typical and atypical forms, as well as  intracranial pressure (ICP) elevation given her history of idiopathic intracranial hypertension, neuro-retinitis. Testing so far without evidence of enhancement of inflammatory lesions, no VST suggesting against inflammatory etiology, however laboratory studies for atypical optic neuritis still pending.      Update 9/20  - Entrance testing stable   - Discussed with patient option for VPS given her elevated intracranial pressure (ICP) and patient is willing to have evaluation for this. Recommend neurosurgery consultation  - Allergy planning for desensitization to diamox on Monday     #Unilateral optic disc edema, right eye  - MRI Brain and orbits and MRV without enhancement of the optic nerve, demyelinating lesions, or VST  - Chest Xray without infiltrate or hilar adenopathy  - Laboratory workup so far unremarkable.  - Lumbar puncture 9/18 with elevated opening pressure to 52 mmHg, elevated protein, with borderline WBC of 6 (no cell type predominant)  - Agree with CT chest to evaluate for sarcoidosis  - Continue IV solumedrol 1g q24h (9/17/24 - ) per neurology  - Continue furosemide 40mg BID for elevated intracranial pressure (ICP).  - Allergy consulted for desensitization to diamox   - Discussed with patient option for VPS given her elevated intracranial pressure (ICP) and patient is willing to have evaluation for this. Recommend neurosurgery consultation     #Ocular hypertension, both eyes  - Possibly related to systemic steroid therapy  - Defer timolol for now in setting of asthma  - Continue brimonidine BID, both eyes      Ophthalmology to continue to follow while inpatient     Thank you for the consult. Note not final until attending signature. Please contact the Ophthalmology service with further questions or concerns.     Pager: [phone]     Rob Estrada MD  Department of Ophthalmology, PGY-3     Discussed with attending physician Dr. Mederos

## 2024-09-20 NOTE — CONSULTS
Date of Service:  9/20/2024 Attending Provider:  Ciara Elder MD     Reason for Consultation:  Jonathan Ayala is being seen today for a consult requested by Ciara Elder MD for IIH.      Subjective   History of Present Illness:  Jonathan is a 48 y.o. 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) s/p R frontal bolt c/b tract hemorrhage, SAH, and focal epilepsy, R hemiplegic migraines, optic neuropathy, CVID (IVIG qmonthly), Sjogren's syndrome, Scleroderma, fibromyalgia, chronic pain syndrome, POTS, T2DM, HTN, hypothyroidism, BRENT on CPAP, morbid obesity, GERD, gastroparesis s/p pyeloplasty, NAFLD, anxiety, depression, 9/17 p/w 1 month of R eye blurry vision, MRI brain RF encephalomalacia, MR orbit STIR signal within L optic nerve, MRV negative, 9/18 s/p LP (OP 52)     The patient reports worsening painless blurry vision in her right eye over the past month, describing it as a curtain descending from top to bottom. This episode differs from previous instances of right eye vision loss, as it has persisted. She also experiences worsening balance, recurrent falls, and nausea. While her headache intensity remains the same as her usual migraines, her auras have changed from white flashing lights to green-orange/red flashes. She finds relief from her headaches by lying down, as sitting up increases her nausea.    Historically, she has experienced right eye vision loss multiple times, but previous evaluations, including imaging and OCT, indicated functional vision loss, which resolved. Notably, a lumbar puncture in 2020 showed positive oligoclonal bands (OCBs), but subsequent tests for OCBs were negative.    Patient also reports being seizure free for 6 months at the beginning of this year. Around 2 months ago her seizures relapsed. She notes being more stressed due to finances and noticed her ear swelling and thought she had an ear infection. She reports having 1-2 seizures a week that usually occur back to back. She states  headache relief after her lumbar puncture.     Of note patient was seen by [facility] on 2/3/2022 and underwent a RF bolt (OP 25) in the OR at that time. Was then admitted on 2/13/2022 with seizure like activity, AEDs were titrated per neurology. Later presented on 3/08/2022 with 4d HA.      Review of Systems   10 point ROS is obtained and negative except the ones mentioned in the HPI    Social History  She reports that she has never smoked. She has never used smokeless tobacco. She reports that she does not currently use alcohol. She reports current drug use. Drug: Benzodiazepines.    Medical History  Past Medical History:   Diagnosis Date    Abnormal findings on diagnostic imaging of other abdominal regions, including retroperitoneum 10/14/2020    Abnormal CT of the abdomen    Acquired deformity of nose 03/24/2022    Nasal deformity    Acute upper respiratory infection, unspecified 10/16/2019    Acute URI    Allergy status to unspecified drugs, medicaments and biological substances 05/22/2020    History of drug allergy    Allergy status to unspecified drugs, medicaments and biological substances 11/13/2020    History of adverse drug reaction    Benign intracranial hypertension 01/27/2022    Pseudotumor cerebri    Bipolar disorder, unspecified (Multi)     Bipolar disease, chronic    Breast calcification, right 08/21/2018    Cellulitis of abdominal wall 09/28/2022    Cellulitis of right abdominal wall    Cervicalgia 07/01/2020    Cervicalgia of uampvdzl-qstwcid-fwkzx region    Chronic maxillary sinusitis 01/04/2022    Chronic maxillary sinusitis    Chronic sialoadenitis 03/16/2020    Chronic sialoadenitis    COVID-19 01/06/2022    COVID-19 with multiple comorbidities    Decreased white blood cell count, unspecified 11/04/2019    Leukopenia    Disease of thyroid gland     Disturbances of salivary secretion 03/16/2020    Xerostomia    Dry eye syndrome of bilateral lacrimal glands 10/07/2022    Dry eyes,  bilateral    Encounter for preprocedural cardiovascular examination 02/01/2022    Preoperative cardiovascular examination    Food additives allergy status 06/11/2020    Allergy to food dye    Fracture of nasal bones, initial encounter for closed fracture 03/03/2022    Closed fracture of nasal bone, initial encounter    Granuloma of right orbit 10/07/2021    Inflammatory pseudotumor of right orbit    History of endometrial ablation 11/09/2017    Hyperlipidemia     Hypertension     Hyperthyroidism     Hypoglycemia     Hypothyroidism     Localized swelling, mass and lump, head 03/24/2022    Swollen nose    Major depressive disorder, recurrent, in full remission ([facility]) 10/07/2021    Depression, major, recurrent, in complete remission    Mammary duct ectasia of left breast 08/24/2022    Periductal mastitis of left breast    Migraine     Nipple discharge 08/24/2022    Bloody discharge from left nipple    Ocular pain, right eye 10/07/2022    Pain in right eye    Optic atrophy     Other abnormal and inconclusive findings on diagnostic imaging of breast 07/06/2020    Other abnormal and inconclusive findings on diagnostic imaging of breast    Other anomalies of pupillary function 05/31/2019    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    Other chest pain 05/18/2020    Chest discomfort    Other conditions influencing health status 08/01/2022    History of cough    Other conditions influencing health status 08/03/2021    Chronic migraine    Other specified disorders of eustachian tube, left ear 11/18/2019    ETD (Eustachian tube dysfunction), left    Other specified disorders of nose and nasal sinuses 03/24/2022    Nasal dryness    Other specified disorders of nose and nasal sinuses 03/24/2022    Nasal crusting    Pelvic and perineal pain 07/06/2020    Pelvic pain    Personal history of other diseases of the circulatory system 04/07/2020    History of sinus tachycardia    Personal history of other diseases of the  circulatory system 04/07/2020    History of abnormal electrocardiography    Personal history of other diseases of the circulatory system     History of Raynaud's syndrome    Personal history of other diseases of the digestive system     History of irritable bowel syndrome    Personal history of other diseases of the digestive system 03/02/2020    History of oral pain    Personal history of other diseases of the musculoskeletal system and connective tissue 01/19/2022    History of neck pain    Personal history of other diseases of the musculoskeletal system and connective tissue 03/02/2021    History of scleroderma    Personal history of other diseases of the musculoskeletal system and connective tissue 06/16/2020    History of muscle weakness    Personal history of other diseases of the nervous system and sense organs 11/18/2019    History of hearing loss    Personal history of other diseases of the nervous system and sense organs 09/21/2022    History of partial seizures    Personal history of other diseases of the respiratory system 04/14/2021    History of asthma    Personal history of other endocrine, nutritional and metabolic disease 02/17/2021    History of diabetes mellitus    Personal history of other mental and behavioral disorders 05/27/2021    History of anxiety    Personal history of other specified conditions 09/07/2022    History of nipple discharge    Personal history of other specified conditions 10/16/2019    History of headache    Personal history of other specified conditions 09/28/2022    History of lump of left breast    Personal history of other specified conditions 09/16/2021    History of persistent cough    Personal history of other specified conditions 02/01/2022    History of palpitations    Personal history of other specified conditions 03/09/2022    History of headache    Personal history of other specified conditions 02/12/2014    History of chest pain    Personal history of other  specified conditions 10/27/2021    History of nausea and vomiting    Personal history of other specified conditions 10/16/2019    History of fatigue    Personal history of other specified conditions 02/26/2021    History of orthopnea    Personal history of other specified conditions 02/22/2021    History of shortness of breath    Personal history of urinary calculi     H/O renal calculi    Polycystic ovary syndrome     Postural orthostatic tachycardia syndrome (POTS)     POTS (postural orthostatic tachycardia syndrome)    Rash and other nonspecific skin eruption 03/15/2022    Rash    Repeated falls 06/23/2021    Recurrent falls    Right lower quadrant pain 10/14/2020    Abdominal pain, RLQ (right lower quadrant)    Sjogren syndrome, unspecified (Multi)     History of Sjogren's disease    Sjogren's syndrome (Multi)     Slow transit constipation 07/09/2020    Slow transit constipation    Subarachnoid hemorrhage, traumatic (Multi) 04/19/2023    Thyroid nodule     Traumatic subarachnoid hemorrhage with loss of consciousness of unspecified duration, subsequent encounter 03/15/2022    Subarachnoid hemorrhage following injury, with loss of consciousness, subsequent encounter    Traumatic subarachnoid hemorrhage without loss of consciousness, subsequent encounter     Subarachnoid hemorrhage following injury, no loss of consciousness, subsequent encounter    Type 2 diabetes mellitus (Multi)     Unspecified disorder of refraction 10/07/2022    Refractive error    Unspecified optic neuritis 11/06/2020    Right optic neuritis    Unspecified optic neuritis 11/06/2020    Optic neuritis, right    Unspecified visual loss 09/25/2019    Vision loss    Venous insufficiency (chronic) (peripheral) 10/18/2021    Chronic venous insufficiency of lower extremity    Viral infection, unspecified 01/11/2022    Nonspecific syndrome suggestive of viral illness    Vitamin D deficiency     Vitamin D deficiency, unspecified 09/28/2022    Vitamin D  deficiency       Surgical History  Past Surgical History:   Procedure Laterality Date    APPENDECTOMY  2017    HERNIA REPAIR Right 03/01/2024    with mesh    HYSTERECTOMY  2017    MR HEAD ANGIO WO IV CONTRAST  02/08/2021    MR HEAD ANGIO WO IV CONTRAST 2/8/2021 [facility]    MR NECK ANGIO WO IV CONTRAST  02/08/2021    MR NECK ANGIO WO IV CONTRAST 2/8/2021 [facility] CL[facility]    OTHER SURGICAL HISTORY  08/22/2019    Carpal tunnel surgery    OTHER SURGICAL HISTORY  08/22/2019    Hysterectomy    OTHER SURGICAL HISTORY  08/22/2019    Venous access port placement    OTHER SURGICAL HISTORY  08/22/2019    Cholecystectomy    OTHER SURGICAL HISTORY  08/22/2019    Appendectomy    OTHER SURGICAL HISTORY  08/22/2019    Pyloroplasty    WISDOM TOOTH EXTRACTION  2004        Objective     Vitals:  Vitals:    09/20/24 0852   BP: 150/88   Pulse: 92   Resp: 16   Temp: 36.2 °C (97.2 °F)   SpO2: 98%         Exam:  Constitutional: No acute distress  Resp: breathing comfortably  Cardio: well perfused  GI: nondistended  MSK: full range of motion  Neuro: Awake, A&Ox3  Left eye blurry vision  hypophonic  Cranial Nerves II-XII: PERRL, EOMI, Face symmetric, Facial SILT, Palate/Tongue midline and symmetric, shoulder shrugs symmetric, hearing intact to finger rubs bilaterally  Motor: 5/5, no dysmetria on finger to nose, no pronator drift  Sensation: SILT throughout all extremities  Psych: appropriate  Skin: no obvious lesions        Medications  Current Outpatient Medications   Medication Instructions    acetaminophen (TYLENOL) 325-650 mg, oral, Every 6 hours PRN, Days of infusions     Advair -21 mcg/actuation inhaler INHALE TWO PUFFS BY MOUTH AS INSTRUCTED TWO TIMES A DAY.    amitriptyline (ELAVIL) 100 mg, oral, Nightly    amLODIPine (NORVASC) 5 mg, oral, Daily    azelastine (Astelin) 137 mcg (0.1 %) nasal spray 1 spray, Each Nostril, 2 times daily, Use in each nostril as directed    BD Ultra-Fine Mini Pen Needle 31 gauge x  "3/16\" needle USE AS DIRECTED FIVE TIMES A DAY.    blood-glucose sensor (Dexcom G6 Sensor) device Use as directed. Change sensors every 10 days    blood-glucose transmitter device (Dexcom G6 Transmitter) device Use as instructed. Change every 90 days    calcium-vitamin D3-vitamin K (Viactiv) 650 mg-12.5 mcg-40 mcg chewable tablet 2 tablets, oral, Daily, At lunch     carboxymethylcellulose (Refresh Celluvisc) 1 % ophthalmic solution dropperette 2 drops, Both Eyes, 3 times daily PRN    cholecalciferol (Vitamin D-3) 5,000 Units tablet 1 tablet, oral, Daily    clonazePAM (KlonoPIN) 0.5 mg tablet 1 tablet, oral, 2 times daily, at the same time.    diclofenac (VOLTAREN) 50 mg, oral, 3 times daily PRN, 30 day supply    diclofenac sodium (VOLTAREN ARTHRITIS PAIN) 4 g, Topical, 3 times daily PRN    diphenhydrAMINE (BENADryl) 12.5 mg/5 mL liquid 10-20 mL, oral, Daily PRN    divalproex (Depakote ER) 500 mg 24 hr tablet 1 tablet, oral, 2 times daily    EPINEPHrine 0.3 mg/0.3 mL injection syringe INJECT INTRAMUSCULARLY ONCE AS NEEDED FOR ANAPHYLAXIS    ezetimibe (ZETIA) 10 mg, oral, Daily    Farxiga 5 mg, oral, Daily    fluticasone (Flonase) 50 mcg/actuation nasal spray USE 1 SPRAY INTO EACH NOSTRIL ONCE DAILY.    folic acid (FOLVITE) 1 mg, oral, Daily    furosemide (LASIX) 20 mg, oral, 2 times daily    glucagon (Baqsimi) 3 mg/actuation spray,non-aerosol USE ONE SPRAY IN THE NOSE AS NEEDED FOR LOW BLOOD SUGAR  MAY REPEAT AFTER 15 MINUTES USING A NEW DEVICE IF NO RESPONSE    hydrocortisone (Cortef) 10 mg tablet TAKE ONE TABLET BY MOUTH TWO TIMES A DAY; TAKE DOUBLE DOSES FOR 3 DAYS WHEN ILL    immune globulin, human, (Gammagard) infusion Infuse 600 mL (60 g) into a venous catheter every 14 (fourteen) days.    insulin glargine (Toujeo Max Solostar- 2 unit dial) 300 unit/mL (3 mL) injection Inject 50 units under skin once daily    insulin lispro (HumaLOG KwikPen Insulin) 100 unit/mL injection Use as directed to give up to 100 units a " day    insulin pump cart,automated,BT (Omnipod 5 G6 Pods, Gen 5,) cartridge 1 Device, subcutaneous, Every 48 hours    ipratropium-albuteroL (Duo-Neb) 0.5-2.5 mg/3 mL nebulizer solution 3 mL, inhalation, 4 times daily PRN    lacosamide (Vimpat) 200 mg tablet tablet 1 tablet, oral, 2 times daily    lacosamide (Vimpat) 50 mg tablet Take 1 tablet (50 mg) by mouth every 12 hours.    levETIRAcetam (KEPPRA) 1,500 mg, oral, 2 times daily    levothyroxine (SYNTHROID, LEVOXYL) 75 mcg, oral, Daily before breakfast    miconazole (Micotin) 2 % powder Apply to groin , under the breast and skin folds daily     moisturizing mouth (Biotene Oral Dry Mouth) solution 1 spray, oral, 4 times daily PRN    montelukast (SINGULAIR) 10 mg, oral, Nightly    Motegrity 2 mg tablet 1 tablet, oral, Daily    naratriptan (AMERGE) 1 mg, oral, Once as needed, May repeat in 4 hours if unresolved. Do not exceed 5 mg in 24 hours.    nasal spray Nayzilam 5 mg/spray (0.1 mL) spray,non-aerosol nasal    ondansetron ODT (ZOFRAN-ODT) 4 mg, oral, Every 8 hours PRN    OneTouch Delica Plus Lancet 33 gauge misc USE 1 LANCET TO CHECK GLUCOSE ONCE DAILY AS DIRECTED    OneTouch Verio test strips strip USE 1 STRIP TO CHECK GLUCOSE ONCE DAILY AS DIRECTED    pantoprazole (PROTONIX) 40 mg, oral, Daily, Do not crush, chew, or split.    promethazine (Phenergan) 12.5 mg tablet Take 1 tablet (12.5 mg) by mouth if needed.    propranolol LA (INDERAL LA) 60 mg, oral, Daily, Do not crush, chew, or split.    Reyvow 100 mg, oral, As needed, Do not drive in 8 hours of taking this medicine. Maximum one dose per 24 hours.    rimegepant (NURTEC ODT) 75 mg, oral, As needed    rOPINIRole (REQUIP) 0.5 mg, oral, Every morning, 1 tab in AM and 2 tabs in PM    scopolamine (Transderm-Scop) 1 mg over 3 days patch 3 day 1 patch, transdermal, Every 72 hours    senna 8.6 mg tablet 1-2 tablets, oral, Nightly PRN    SUMAtriptan (IMITREX) 25 mg, oral, Once as needed, May repeat dose once in 2 hours  if no relief.  Do not exceed 2 doses in 24 hours.    tiZANidine (ZANAFLEX) 2 mg, oral, Every 8 hours PRN    ubrogepant (UBRELVY) 100 mg, oral, As needed, May repeat in 2 hours for max of 200mg per 24 hours.    ZINC ORAL 50 mg, oral, Daily, Take as directed        Diagnostic Results:    Lab Results   Component Value Date    WBC 9.6 09/20/2024    HGB 11.7 (L) 09/20/2024    HCT 35.3 (L) 09/20/2024    MCV 86 09/20/2024     09/20/2024     Lab Results   Component Value Date    CREATININE 0.88 09/20/2024    BUN 24 (H) 09/20/2024     (L) 09/20/2024    K 3.8 09/20/2024    CL 99 09/20/2024    CO2 29 09/20/2024     Lab Results   Component Value Date    INR 1.1 09/17/2024    INR 0.9 01/31/2023    INR 0.9 02/13/2022    PROTIME 11.9 09/17/2024    PROTIME 10.3 01/31/2023    PROTIME 10.3 02/13/2022       Imaging Results:    MR brain w and wo IV contrast   Final Result   There is similar mild brain parenchymal volume loss when compared   with 06/05/2024.        An area of encephalomalacia is again noted within the right frontal   lobe convexity. The gradient echo T2 images again demonstrate a small   amount of curvilinear blooming dark signal along the margins of the   area of encephalomalacia suggesting associated hemosiderin deposition   anterior dystrophic calcification/mineralization.        The high-resolution MRI images of the orbits again demonstrate   asymmetric atrophy and increased STIR signal within the left optic   nerve when compared with the right similar when compared with the   prior MRI of the orbits dated 06/05/2024. Again, no corresponding   abnormal enhancement is identified.        The intracranial MRV is within normal limits without evidence of   venous sinus thrombosis.             I personally reviewed the images/study and I agree with the findings   as stated by Dr. Brody Daley M.D. This study was interpreted at   [facility], [place], [place].        MACRO:    None.        Signed by: Rob Mendes 9/17/2024 5:36 AM   Dictation workstation:   [id number]      MR orbit w and wo IV contrast   Final Result   There is similar mild brain parenchymal volume loss when compared   with 06/05/2024.        An area of encephalomalacia is again noted within the right frontal   lobe convexity. The gradient echo T2 images again demonstrate a small   amount of curvilinear blooming dark signal along the margins of the   area of encephalomalacia suggesting associated hemosiderin deposition   anterior dystrophic calcification/mineralization.        The high-resolution MRI images of the orbits again demonstrate   asymmetric atrophy and increased STIR signal within the left optic   nerve when compared with the right similar when compared with the   prior MRI of the orbits dated 06/05/2024. Again, no corresponding   abnormal enhancement is identified.        The intracranial MRV is within normal limits without evidence of   venous sinus thrombosis.             I personally reviewed the images/study and I agree with the findings   as stated by Dr. Brody Daley M.D. This study was interpreted at   [place], [place].        MACRO:   None.        Signed by: Rob Mendes 9/17/2024 5:36 AM   Dictation workstation:   [id number]      MR venography intracranial w and wo IV contrast   Final Result   There is similar mild brain parenchymal volume loss when compared   with 06/05/2024.        An area of encephalomalacia is again noted within the right frontal   lobe convexity. The gradient echo T2 images again demonstrate a small   amount of curvilinear blooming dark signal along the margins of the   area of encephalomalacia suggesting associated hemosiderin deposition   anterior dystrophic calcification/mineralization.        The high-resolution MRI images of the orbits again demonstrate   asymmetric atrophy and increased STIR signal within the left optic    nerve when compared with the right similar when compared with the   prior MRI of the orbits dated 06/05/2024. Again, no corresponding   abnormal enhancement is identified.        The intracranial MRV is within normal limits without evidence of   venous sinus thrombosis.             I personally reviewed the images/study and I agree with the findings   as stated by Dr. Brody Daley M.D. This study was interpreted at   [place], [place].        MACRO:   None.        Signed by: Rob Mendes 9/17/2024 5:36 AM   Dictation workstation:   [id number]      XR chest 1 view   Final Result   1.  No acute cardiopulmonary process.        I personally reviewed the image(s)/study and resident interpretation.   I agree with the findings as stated by resident Martin Weston.   Data analyzed and images interpreted at [place], [place].        MACRO:   None        Signed by: Torri Spivey 9/17/2024 3:23 AM   Dictation workstation:   [id number]      CT chest w and wo IV contrast    (Results Pending)             Assessment/Plan   Assessment:  Jonathan is a 48 y.o. 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) s/p R frontal bolt c/b tract hemorrhage, SAH, and focal epilepsy, R hemiplegic migraines, optic neuropathy, CVID (IVIG qmonthly), Sjogren's syndrome, Scleroderma, fibromyalgia, chronic pain syndrome, POTS, T2DM, HTN, hypothyroidism, BRENT on CPAP, morbid obesity, GERD, gastroparesis s/p pyeloplasty, NAFLD, anxiety, depression, 9/17 p/w 1 month of R eye blurry vision, and pulsatile tinnitus, MRI brain RF encephalomalacia, MR orbit STIR signal within L optic nerve, MRV negative, 9/18 s/p LP (OP 52)     Plan:  - Appreciate ophtho recommendation, in this setting would recommend optic nerve fenestration if possible  - Will discuss possibility of venous manometry and angiography as outpatient   - Will discuss with Primary pulmonologist regarding  possibility of VPS in setting of CVID  - Rest of care per primary team     Rom Hicks MD    Note authored by resident on neurosurgery team, with all questions or to contact team please page at [phone]  Plan not finalized until note signed by attending

## 2024-09-21 ENCOUNTER — APPOINTMENT (OUTPATIENT)
Dept: RADIOLOGY | Facility: HOSPITAL | Age: 49
End: 2024-09-21
Payer: MEDICAID

## 2024-09-21 LAB
ALB CSF/SERPL: 12.1 RATIO (ref 0–9)
ALBUMIN CSF-MCNC: 46 MG/DL (ref 0–35)
ALBUMIN SERPL BCP-MCNC: 3.7 G/DL (ref 3.4–5)
ALBUMIN SERPL-MCNC: 3807 MG/DL (ref 3500–5200)
ANION GAP SERPL CALC-SCNC: 13 MMOL/L (ref 10–20)
BACTERIA CSF CULT: NORMAL
BASOPHILS # BLD AUTO: 0.01 X10*3/UL (ref 0–0.1)
BASOPHILS NFR BLD AUTO: 0.1 %
BUN SERPL-MCNC: 23 MG/DL (ref 6–23)
CALCIUM SERPL-MCNC: 8.4 MG/DL (ref 8.6–10.6)
CHLORIDE SERPL-SCNC: 98 MMOL/L (ref 98–107)
CO2 SERPL-SCNC: 31 MMOL/L (ref 21–32)
CREAT SERPL-MCNC: 1 MG/DL (ref 0.5–1.05)
EGFRCR SERPLBLD CKD-EPI 2021: 71 ML/MIN/1.73M*2
EOSINOPHIL # BLD AUTO: 0.01 X10*3/UL (ref 0–0.7)
EOSINOPHIL NFR BLD AUTO: 0.1 %
ERYTHROCYTE [DISTWIDTH] IN BLOOD BY AUTOMATED COUNT: 14.6 % (ref 11.5–14.5)
GLUCOSE BLD MANUAL STRIP-MCNC: 109 MG/DL (ref 74–99)
GLUCOSE BLD MANUAL STRIP-MCNC: 143 MG/DL (ref 74–99)
GLUCOSE BLD MANUAL STRIP-MCNC: 281 MG/DL (ref 74–99)
GLUCOSE BLD MANUAL STRIP-MCNC: 293 MG/DL (ref 74–99)
GLUCOSE SERPL-MCNC: 182 MG/DL (ref 74–99)
GRAM STN SPEC: NORMAL
GRAM STN SPEC: NORMAL
HCT VFR BLD AUTO: 38.8 % (ref 36–46)
HGB BLD-MCNC: 12 G/DL (ref 12–16)
IGG CSF-MCNC: 13.7 MG/DL (ref 0–6)
IGG SERPL-MCNC: 2106 MG/DL (ref 768–1632)
IGG SYNTH RATE SER+CSF CALC-MRATE: 4.9 MG/D
IGG/ALB CLEAR SER+CSF-RTO: 0.54 RATIO (ref 0.28–0.66)
IGG/ALB CSF: 0.3 RATIO (ref 0.09–0.25)
IMM GRANULOCYTES # BLD AUTO: 0.04 X10*3/UL (ref 0–0.7)
IMM GRANULOCYTES NFR BLD AUTO: 0.4 % (ref 0–0.9)
LYMPHOCYTES # BLD AUTO: 1.51 X10*3/UL (ref 1.2–4.8)
LYMPHOCYTES NFR BLD AUTO: 16.9 %
MAGNESIUM SERPL-MCNC: 2.02 MG/DL (ref 1.6–2.4)
MCH RBC QN AUTO: 28.4 PG (ref 26–34)
MCHC RBC AUTO-ENTMCNC: 30.9 G/DL (ref 32–36)
MCV RBC AUTO: 92 FL (ref 80–100)
MONOCYTES # BLD AUTO: 0.8 X10*3/UL (ref 0.1–1)
MONOCYTES NFR BLD AUTO: 8.9 %
NEUTROPHILS # BLD AUTO: 6.57 X10*3/UL (ref 1.2–7.7)
NEUTROPHILS NFR BLD AUTO: 73.6 %
NRBC BLD-RTO: 0 /100 WBCS (ref 0–0)
OLIGOCLONAL BANDS CSF ELPH-IMP: ABNORMAL
OLIGOCLONAL BANDS CSF ELPH-IMP: NEGATIVE
OLIGOCLONAL BANDS CSF IEF: 0 BANDS (ref 0–1)
PHOSPHATE SERPL-MCNC: 2.8 MG/DL (ref 2.5–4.9)
PLATELET # BLD AUTO: 202 X10*3/UL (ref 150–450)
POTASSIUM SERPL-SCNC: 3.7 MMOL/L (ref 3.5–5.3)
RBC # BLD AUTO: 4.22 X10*6/UL (ref 4–5.2)
SODIUM SERPL-SCNC: 138 MMOL/L (ref 136–145)
VIT B1 PYROPHOSHATE BLD-SCNC: 142 NMOL/L (ref 70–180)
WBC # BLD AUTO: 8.9 X10*3/UL (ref 4.4–11.3)

## 2024-09-21 PROCEDURE — 2500000004 HC RX 250 GENERAL PHARMACY W/ HCPCS (ALT 636 FOR OP/ED)

## 2024-09-21 PROCEDURE — 2500000001 HC RX 250 WO HCPCS SELF ADMINISTERED DRUGS (ALT 637 FOR MEDICARE OP)

## 2024-09-21 PROCEDURE — 99232 SBSQ HOSP IP/OBS MODERATE 35: CPT | Performed by: PSYCHIATRY & NEUROLOGY

## 2024-09-21 PROCEDURE — 2500000002 HC RX 250 W HCPCS SELF ADMINISTERED DRUGS (ALT 637 FOR MEDICARE OP, ALT 636 FOR OP/ED)

## 2024-09-21 PROCEDURE — 82947 ASSAY GLUCOSE BLOOD QUANT: CPT

## 2024-09-21 PROCEDURE — 1100000001 HC PRIVATE ROOM DAILY

## 2024-09-21 PROCEDURE — 70551 MRI BRAIN STEM W/O DYE: CPT | Performed by: RADIOLOGY

## 2024-09-21 PROCEDURE — 85025 COMPLETE CBC W/AUTO DIFF WBC: CPT

## 2024-09-21 PROCEDURE — 70450 CT HEAD/BRAIN W/O DYE: CPT

## 2024-09-21 PROCEDURE — 2500000001 HC RX 250 WO HCPCS SELF ADMINISTERED DRUGS (ALT 637 FOR MEDICARE OP): Performed by: STUDENT IN AN ORGANIZED HEALTH CARE EDUCATION/TRAINING PROGRAM

## 2024-09-21 PROCEDURE — 80069 RENAL FUNCTION PANEL: CPT

## 2024-09-21 PROCEDURE — 99254 IP/OBS CNSLTJ NEW/EST MOD 60: CPT

## 2024-09-21 PROCEDURE — 70498 CT ANGIOGRAPHY NECK: CPT

## 2024-09-21 PROCEDURE — 70551 MRI BRAIN STEM W/O DYE: CPT

## 2024-09-21 PROCEDURE — 83735 ASSAY OF MAGNESIUM: CPT

## 2024-09-21 PROCEDURE — 2550000001 HC RX 255 CONTRASTS: Performed by: PSYCHIATRY & NEUROLOGY

## 2024-09-21 RX ORDER — ONDANSETRON HYDROCHLORIDE 2 MG/ML
4 INJECTION, SOLUTION INTRAVENOUS EVERY 8 HOURS PRN
Status: DISCONTINUED | OUTPATIENT
Start: 2024-09-21 | End: 2024-09-25 | Stop reason: HOSPADM

## 2024-09-21 RX ORDER — ONDANSETRON 4 MG/1
4 TABLET, ORALLY DISINTEGRATING ORAL EVERY 6 HOURS PRN
Status: DISCONTINUED | OUTPATIENT
Start: 2024-09-21 | End: 2024-09-25 | Stop reason: HOSPADM

## 2024-09-21 RX ORDER — ONDANSETRON HYDROCHLORIDE 2 MG/ML
4 INJECTION, SOLUTION INTRAVENOUS ONCE
Status: COMPLETED | OUTPATIENT
Start: 2024-09-21 | End: 2024-09-21

## 2024-09-21 RX ORDER — ACETAMINOPHEN 325 MG/1
650 TABLET ORAL ONCE
Status: DISCONTINUED | OUTPATIENT
Start: 2024-09-21 | End: 2024-09-25 | Stop reason: HOSPADM

## 2024-09-21 ASSESSMENT — COGNITIVE AND FUNCTIONAL STATUS - GENERAL
DAILY ACTIVITIY SCORE: 24
MOBILITY SCORE: 24

## 2024-09-21 ASSESSMENT — PAIN DESCRIPTION - ORIENTATION: ORIENTATION: RIGHT

## 2024-09-21 ASSESSMENT — PAIN - FUNCTIONAL ASSESSMENT
PAIN_FUNCTIONAL_ASSESSMENT: 0-10

## 2024-09-21 ASSESSMENT — PAIN DESCRIPTION - LOCATION: LOCATION: HEAD

## 2024-09-21 ASSESSMENT — PAIN SCALES - GENERAL
PAINLEVEL_OUTOF10: 3
PAINLEVEL_OUTOF10: 2
PAINLEVEL_OUTOF10: 7
PAINLEVEL_OUTOF10: 7

## 2024-09-21 NOTE — PROGRESS NOTES
"Jonathan Ayala is a 48 y.o. female on day 4 of admission presenting with Vision changes.      Subjective   Patient seen at bedside this morning. She feels her headache is worse when she is sitting up. She thinks her vision has been stable, has not noticed any changes since yesterday.     She reports she discussed with her family in depth last night and would like to proceed with VPS surgery.        Objective     Last Recorded Vitals  Blood pressure 105/68, pulse 100, temperature 36.1 °C (97 °F), resp. rate 16, height 1.575 m (5' 2\"), weight 118 kg (261 lb), SpO2 93%.    Physical Exam  Base Eye Exam       Visual Acuity (Snellen - Linear)         Right Left    Near sc 20/40 ph 20/25-2 20/200 ph 20/70-2              Tonometry (Tonopen, 10:00 AM)         Right Left    Pressure 20 17              Pupils         Pupils Dark Light Shape React APD    Right PERRL, No APD 5 3 Round Brisk None    Left PERRL, No APD 5 3 Round Brisk + APD              Visual Fields (Counting fingers)         Left Right                                Extraocular Movement         Right Left     Full Full              Neuro/Psych       Oriented x3: Yes    Mood/Affect: Normal                  Additional Tests       Color         Right Left    [place] 12/14 1/14                  Slit Lamp and Fundus Exam       External Exam         Right Left    External Normal Normal              Slit Lamp Exam         Right Left    Lids/Lashes Normal Normal                    - Labs: B12 wnl, Folate 1nl, Toxo IgG wnl, Syphilis total antibodies (Ab) reactive (negative on repeat), RPR negative, Rickettsia wnl, Lyme wnl, Bartonella wnl, T spot negative, ACE wnl,   - LP 9/18/24: Opening pressure 52, WBC 6, 36% neutrophils, 29% lymphocytes, 36% monos. Protein elevated to 79, and elevated glucose 154 (confounded by her elevated POC glucose of 300). ). Oligoclonal bands negative, HSV negative, VDRL negative, meningitis PCR panel negative, cytology normal,   - Pending " labs: CNS demyelinating panel, Vitamin A, Vitamin B1, cerebrospinal fluid (CSF) studies     - MRI Brain and Orbit with and without contrast, MRV:  IMPRESSION:  There is similar mild brain parenchymal volume loss when compared with 06/05/2024.      An area of encephalomalacia is again noted within the right frontal lobe convexity. The gradient echo T2 images again demonstrate a small amount of curvilinear blooming dark signal along the margins of the area of encephalomalacia suggesting associated hemosiderin deposition anterior dystrophic calcification/mineralization.      The high-resolution MRI images of the orbits again demonstrate asymmetric atrophy and increased STIR signal within the left optic nerve when compared with the right similar when compared with the prior MRI of the orbits dated 06/05/2024. Again, no corresponding abnormal enhancement is identified.      The intracranial MRV is within normal limits without evidence of  venous sinus thrombosis.                         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     Seen in clinic with Dr. Mederos on 9/16/24 with new grade 4 right optic disc edema. Differential diagnosis includes sequential non-arteritic anterior ischemic optic neuropathy, but also optic neuritis, both typical and atypical forms, as well as intracranial pressure (ICP) elevation given her history of idiopathic intracranial hypertension, neuro-retinitis. Testing so far without evidence of enhancement of inflammatory lesions, no VST suggesting against inflammatory etiology, however laboratory studies for atypical optic neuritis  still pending.      Update 9/21  - Entrance testing with slightly worsened BCVA OD today 20/25-2 down from 20/20   - Patient discussed with family and would like to pursue VPS shunt  - Per neurosurgery, tentatively plan for shunt placement Tuesday pending OR clearance documentation from medicine/pulm   - Allergy planning for desensitization to diamox on Monday     #Unilateral optic disc edema, right eye  - MRI Brain and orbits and MRV without enhancement of the optic nerve, demyelinating lesions, or VST  - Chest Xray without infiltrate or hilar adenopathy  - Laboratory workup so far unremarkable.  - Lumbar puncture 9/18 with elevated opening pressure to 52 mmHg, elevated protein, with borderline WBC of 6 (no cell type predominant)  - Agree with CT chest to evaluate for sarcoidosis  - Continue IV solumedrol 1g q24h (9/17/24 - ) per neurology  - Continue furosemide 40mg BID for elevated intracranial pressure (ICP).  - Allergy consulted for desensitization to diamox   - Discussed with patient option for VPS given her elevated intracranial pressure (ICP)   - Patient wishes to pursue VPS, NSGY tentatively planning for Tuesday OR     #Ocular hypertension, both eyes  - Possibly related to systemic steroid therapy  - Defer timolol for now in setting of asthma  - Continue brimonidine BID, both eyes      Ophthalmology to continue to follow while inpatient     Thank you for the consult. Note not final until attending signature. Please contact the Ophthalmology service with further questions or concerns.     Pager: [phone]    Kia Robins MD

## 2024-09-21 NOTE — CONSULTS
"    Subjective     HPI  Jonathan Ayala is a 48 y.o. 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 Lumbar puncture mostly noticeable for elevated protein and opening pressure of 52, but no pleocytosis with predominant cell type, with leading differential for initial presentation of IIH.      At 1532 patient endorses worsening headache, left face and extremity tingling. BAT called at 1627 for new symptoms of left-sided weakness. NIHSS 7 for LOC 1, partial HH, LUE drift, LLE effort versus gravity, mild dysarthria, and partial sensory loss. She also endorsed right-sided facial numbness instead at one point during this clinical change. Of note, her last IVIG infusion for her CVID and Sjorgen's syndrome was on 09/12. She clarified history of both left-sided and right-sided hemiplegic migraines.  Her tract hemorrhage and subarachnoid hemorrhage were in the setting of right frontal bolt; neither spontaneous nor hypertensive.    Time of stroke team arrival: 1627  Direct to CT?: Yes  Time of CTH read by stroke team: As it was being performed    Past Medical History  Past Medical History:   Diagnosis Date    Abnormal findings on diagnostic imaging of other abdominal regions, including retroperitoneum 10/14/2020    Abnormal CT of the abdomen    Acquired deformity of nose 03/24/2022    Nasal deformity    Acute upper respiratory infection, unspecified 10/16/2019    Acute URI    Allergy status to unspecified drugs, medicaments and biological substances 05/22/2020    History of drug allergy    Allergy status to unspecified drugs, medicaments and biological " substances 11/13/2020    History of adverse drug reaction    Benign intracranial hypertension 01/27/2022    Pseudotumor cerebri    Bipolar disorder, unspecified (Multi)     Bipolar disease, chronic    Breast calcification, right 08/21/2018    Cellulitis of abdominal wall 09/28/2022    Cellulitis of right abdominal wall    Cervicalgia 07/01/2020    Cervicalgia of tpruzfis-nwbiaie-oxohd region    Chronic maxillary sinusitis 01/04/2022    Chronic maxillary sinusitis    Chronic sialoadenitis 03/16/2020    Chronic sialoadenitis    COVID-19 01/06/2022    COVID-19 with multiple comorbidities    Decreased white blood cell count, unspecified 11/04/2019    Leukopenia    Disease of thyroid gland     Disturbances of salivary secretion 03/16/2020    Xerostomia    Dry eye syndrome of bilateral lacrimal glands 10/07/2022    Dry eyes, bilateral    Encounter for preprocedural cardiovascular examination 02/01/2022    Preoperative cardiovascular examination    Food additives allergy status 06/11/2020    Allergy to food dye    Fracture of nasal bones, initial encounter for closed fracture 03/03/2022    Closed fracture of nasal bone, initial encounter    Granuloma of right orbit 10/07/2021    Inflammatory pseudotumor of right orbit    History of endometrial ablation 11/09/2017    Hyperlipidemia     Hypertension     Hyperthyroidism     Hypoglycemia     Hypothyroidism     Localized swelling, mass and lump, head 03/24/2022    Swollen nose    Major depressive disorder, recurrent, in full remission (CMS-HCC) 10/07/2021    Depression, major, recurrent, in complete remission    Mammary duct ectasia of left breast 08/24/2022    Periductal mastitis of left breast    Migraine     Nipple discharge 08/24/2022    Bloody discharge from left nipple    Ocular pain, right eye 10/07/2022    Pain in right eye    Optic atrophy     Other abnormal and inconclusive findings on diagnostic imaging of breast 07/06/2020    Other abnormal and inconclusive findings  on diagnostic imaging of breast    Other anomalies of pupillary function 05/31/2019    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    Other chest pain 05/18/2020    Chest discomfort    Other conditions influencing health status 08/01/2022    History of cough    Other conditions influencing health status 08/03/2021    Chronic migraine    Other specified disorders of eustachian tube, left ear 11/18/2019    ETD (Eustachian tube dysfunction), left    Other specified disorders of nose and nasal sinuses 03/24/2022    Nasal dryness    Other specified disorders of nose and nasal sinuses 03/24/2022    Nasal crusting    Pelvic and perineal pain 07/06/2020    Pelvic pain    Personal history of other diseases of the circulatory system 04/07/2020    History of sinus tachycardia    Personal history of other diseases of the circulatory system 04/07/2020    History of abnormal electrocardiography    Personal history of other diseases of the circulatory system     History of Raynaud's syndrome    Personal history of other diseases of the digestive system     History of irritable bowel syndrome    Personal history of other diseases of the digestive system 03/02/2020    History of oral pain    Personal history of other diseases of the musculoskeletal system and connective tissue 01/19/2022    History of neck pain    Personal history of other diseases of the musculoskeletal system and connective tissue 03/02/2021    History of scleroderma    Personal history of other diseases of the musculoskeletal system and connective tissue 06/16/2020    History of muscle weakness    Personal history of other diseases of the nervous system and sense organs 11/18/2019    History of hearing loss    Personal history of other diseases of the nervous system and sense organs 09/21/2022    History of partial seizures    Personal history of other diseases of the respiratory system 04/14/2021    History of asthma    Personal history of other endocrine,  nutritional and metabolic disease 02/17/2021    History of diabetes mellitus    Personal history of other mental and behavioral disorders 05/27/2021    History of anxiety    Personal history of other specified conditions 09/07/2022    History of nipple discharge    Personal history of other specified conditions 10/16/2019    History of headache    Personal history of other specified conditions 09/28/2022    History of lump of left breast    Personal history of other specified conditions 09/16/2021    History of persistent cough    Personal history of other specified conditions 02/01/2022    History of palpitations    Personal history of other specified conditions 03/09/2022    History of headache    Personal history of other specified conditions 02/12/2014    History of chest pain    Personal history of other specified conditions 10/27/2021    History of nausea and vomiting    Personal history of other specified conditions 10/16/2019    History of fatigue    Personal history of other specified conditions 02/26/2021    History of orthopnea    Personal history of other specified conditions 02/22/2021    History of shortness of breath    Personal history of urinary calculi     H/O renal calculi    Polycystic ovary syndrome     Postural orthostatic tachycardia syndrome (POTS)     POTS (postural orthostatic tachycardia syndrome)    Rash and other nonspecific skin eruption 03/15/2022    Rash    Repeated falls 06/23/2021    Recurrent falls    Right lower quadrant pain 10/14/2020    Abdominal pain, RLQ (right lower quadrant)    Sjogren syndrome, unspecified (Multi)     History of Sjogren's disease    Sjogren's syndrome (Multi)     Slow transit constipation 07/09/2020    Slow transit constipation    Subarachnoid hemorrhage, traumatic (Multi) 04/19/2023    Thyroid nodule     Traumatic subarachnoid hemorrhage with loss of consciousness of unspecified duration, subsequent encounter 03/15/2022    Subarachnoid hemorrhage  following injury, with loss of consciousness, subsequent encounter    Traumatic subarachnoid hemorrhage without loss of consciousness, subsequent encounter     Subarachnoid hemorrhage following injury, no loss of consciousness, subsequent encounter    Type 2 diabetes mellitus (Multi)     Unspecified disorder of refraction 10/07/2022    Refractive error    Unspecified optic neuritis 11/06/2020    Right optic neuritis    Unspecified optic neuritis 11/06/2020    Optic neuritis, right    Unspecified visual loss 09/25/2019    Vision loss    Venous insufficiency (chronic) (peripheral) 10/18/2021    Chronic venous insufficiency of lower extremity    Viral infection, unspecified 01/11/2022    Nonspecific syndrome suggestive of viral illness    Vitamin D deficiency     Vitamin D deficiency, unspecified 09/28/2022    Vitamin D deficiency     Surgical History  Past Surgical History:   Procedure Laterality Date    APPENDECTOMY  2017    HERNIA REPAIR Right 03/01/2024    with mesh    HYSTERECTOMY  2017    MR HEAD ANGIO WO IV CONTRAST  02/08/2021    MR HEAD ANGIO WO IV CONTRAST 2/8/2021 [facility]    MR NECK ANGIO WO IV CONTRAST  02/08/2021    MR NECK ANGIO WO IV CONTRAST 2/8/2021 [facility] CLINICAL LEGAC[facility]    OTHER SURGICAL HISTORY  08/22/2019    Carpal tunnel surgery    OTHER SURGICAL HISTORY  08/22/2019    Hysterectomy    OTHER SURGICAL HISTORY  08/22/2019    Venous access port placement    OTHER SURGICAL HISTORY  08/22/2019    Cholecystectomy    OTHER SURGICAL HISTORY  08/22/2019    Appendectomy    OTHER SURGICAL HISTORY  08/22/2019    Pyloroplasty    WISDOM TOOTH EXTRACTION  2004     Social History  Social History     Tobacco Use    Smoking status: Never    Smokeless tobacco: Never   Vaping Use    Vaping status: Never Used   Substance Use Topics    Alcohol use: Not Currently     Comment: Socially in College    Drug use: Yes     Types: Benzodiazepines     Allergies  Acetazolamide, Atorvastatin, Cefdinir, Ceftriaxone,  "Doxepin, Duloxetine, Levofloxacin, Levofloxacin in d5w, Nutritional supplements, Ozempic [semaglutide], Prochlorperazine, Red dye, Rosemary, Rosemary oil, Strawberry, Sulfa (sulfonamide antibiotics), Topiramate, Tree pollen-black walnut, Tree pollen-pecan, Woodruff, Aripiprazole, Aspartame, Aspartame (bulk), Fenofibrate, Gluten, Hydrochlorothiazide, Hydromorphone, Iron, Meclizine, Metformin, Propoxyphene, Statins-hmg-coa reductase inhibitors, Tetracyclines, Thiazides, Venlafaxine, Wheat, Adhesive tape-silicones, Barbiturates, Ciprofloxacin, Dhe, Diet foods, Farxiga [dapagliflozin], Ferrous sulfate, Nsaids (non-steroidal anti-inflammatory drug), Other, Sulfonylureas, Tobramycin, Vancomycin, Adhesive, Azithromycin, Betamethasone, House dust, Metoclopramide hcl, Prednisone, Propoxyphene-acetaminophen, Sulfacetamide sodium, [id number] antimigraine agents, and Zolpidem    Home Medications  Current Outpatient Medications   Medication Instructions    acetaminophen (TYLENOL) 325-650 mg, oral, Every 6 hours PRN, Days of infusions     Advair -21 mcg/actuation inhaler INHALE TWO PUFFS BY MOUTH AS INSTRUCTED TWO TIMES A DAY.    amitriptyline (ELAVIL) 100 mg, oral, Nightly    amLODIPine (NORVASC) 5 mg, oral, Daily    azelastine (Astelin) 137 mcg (0.1 %) nasal spray 1 spray, Each Nostril, 2 times daily, Use in each nostril as directed    BD Ultra-Fine Mini Pen Needle 31 gauge x 3/16\" needle USE AS DIRECTED FIVE TIMES A DAY.    blood-glucose sensor (Dexcom G6 Sensor) device Use as directed. Change sensors every 10 days    blood-glucose transmitter device (Dexcom G6 Transmitter) device Use as instructed. Change every 90 days    calcium-vitamin D3-vitamin K (Viactiv) 650 mg-12.5 mcg-40 mcg chewable tablet 2 tablets, oral, Daily, At lunch     carboxymethylcellulose (Refresh Celluvisc) 1 % ophthalmic solution dropperette 2 drops, Both Eyes, 3 times daily PRN    cholecalciferol (Vitamin D-3) 5,000 Units tablet 1 tablet, oral, " Daily    clonazePAM (KlonoPIN) 0.5 mg tablet 1 tablet, oral, 2 times daily, at the same time.    diclofenac (VOLTAREN) 50 mg, oral, 3 times daily PRN, 30 day supply    diclofenac sodium (VOLTAREN ARTHRITIS PAIN) 4 g, Topical, 3 times daily PRN    diphenhydrAMINE (BENADryl) 12.5 mg/5 mL liquid 10-20 mL, oral, Daily PRN    divalproex (Depakote ER) 500 mg 24 hr tablet 1 tablet, oral, 2 times daily    EPINEPHrine 0.3 mg/0.3 mL injection syringe INJECT INTRAMUSCULARLY ONCE AS NEEDED FOR ANAPHYLAXIS    ezetimibe (ZETIA) 10 mg, oral, Daily    Farxiga 5 mg, oral, Daily    fluticasone (Flonase) 50 mcg/actuation nasal spray USE 1 SPRAY INTO EACH NOSTRIL ONCE DAILY.    folic acid (FOLVITE) 1 mg, oral, Daily    furosemide (LASIX) 20 mg, oral, 2 times daily    glucagon (Baqsimi) 3 mg/actuation spray,non-aerosol USE ONE SPRAY IN THE NOSE AS NEEDED FOR LOW BLOOD SUGAR  MAY REPEAT AFTER 15 MINUTES USING A NEW DEVICE IF NO RESPONSE    hydrocortisone (Cortef) 10 mg tablet TAKE ONE TABLET BY MOUTH TWO TIMES A DAY; TAKE DOUBLE DOSES FOR 3 DAYS WHEN ILL    immune globulin, human, (Gammagard) infusion Infuse 600 mL (60 g) into a venous catheter every 14 (fourteen) days.    insulin glargine (Toujeo Max Solostar- 2 unit dial) 300 unit/mL (3 mL) injection Inject 50 units under skin once daily    insulin lispro (HumaLOG KwikPen Insulin) 100 unit/mL injection Use as directed to give up to 100 units a day    insulin pump cart,automated,BT (Omnipod 5 G6 Pods, Gen 5,) cartridge 1 Device, subcutaneous, Every 48 hours    ipratropium-albuteroL (Duo-Neb) 0.5-2.5 mg/3 mL nebulizer solution 3 mL, inhalation, 4 times daily PRN    lacosamide (Vimpat) 200 mg tablet tablet 1 tablet, oral, 2 times daily    lacosamide (Vimpat) 50 mg tablet Take 1 tablet (50 mg) by mouth every 12 hours.    levETIRAcetam (KEPPRA) 1,500 mg, oral, 2 times daily    levothyroxine (SYNTHROID, LEVOXYL) 75 mcg, oral, Daily before breakfast    miconazole (Micotin) 2 % powder Apply to  groin , under the breast and skin folds daily     moisturizing mouth (Biotene Oral Dry Mouth) solution 1 spray, oral, 4 times daily PRN    montelukast (SINGULAIR) 10 mg, oral, Nightly    Motegrity 2 mg tablet 1 tablet, oral, Daily    naratriptan (AMERGE) 1 mg, oral, Once as needed, May repeat in 4 hours if unresolved. Do not exceed 5 mg in 24 hours.    nasal spray Nayzilam 5 mg/spray (0.1 mL) spray,non-aerosol nasal    ondansetron ODT (ZOFRAN-ODT) 4 mg, oral, Every 8 hours PRN    OneTouch Delica Plus Lancet 33 gauge misc USE 1 LANCET TO CHECK GLUCOSE ONCE DAILY AS DIRECTED    OneTouch Verio test strips strip USE 1 STRIP TO CHECK GLUCOSE ONCE DAILY AS DIRECTED    pantoprazole (PROTONIX) 40 mg, oral, Daily, Do not crush, chew, or split.    promethazine (Phenergan) 12.5 mg tablet Take 1 tablet (12.5 mg) by mouth if needed.    propranolol LA (INDERAL LA) 60 mg, oral, Daily, Do not crush, chew, or split.    Reyvow 100 mg, oral, As needed, Do not drive in 8 hours of taking this medicine. Maximum one dose per 24 hours.    rimegepant (NURTEC ODT) 75 mg, oral, As needed    rOPINIRole (REQUIP) 0.5 mg, oral, Every morning, 1 tab in AM and 2 tabs in PM    scopolamine (Transderm-Scop) 1 mg over 3 days patch 3 day 1 patch, transdermal, Every 72 hours    senna 8.6 mg tablet 1-2 tablets, oral, Nightly PRN    SUMAtriptan (IMITREX) 25 mg, oral, Once as needed, May repeat dose once in 2 hours if no relief.  Do not exceed 2 doses in 24 hours.    tiZANidine (ZANAFLEX) 2 mg, oral, Every 8 hours PRN    ubrogepant (UBRELVY) 100 mg, oral, As needed, May repeat in 2 hours for max of 200mg per 24 hours.    ZINC ORAL 50 mg, oral, Daily, Take as directed           Objective   24h Vitals  Heart Rate:  []   Temp:  [35.4 °C (95.7 °F)-35.7 °C (96.3 °F)]   Resp:  [16-18]   BP: ()/(58-74)   SpO2:  [93 %-97 %]      Physical Exam  Neurological Exam  Mental Status   dysarthria present. Able to name objects and name parts of  objects.    Cranial Nerves  CN III, IV, VI: Extraocular movements intact bilaterally.  CN VII:  Right: There is no facial weakness.  Left: There is no facial weakness.  CN VIII: Intact to conversation.    Motor   Normal muscle tone.  Campo's sign appreciated with right lower extremity elevation.    Sensory  Light touch abnormality: Sensation: Endorses decreased sensation over left extremities.     Physical Exam  Eyes:      Extraocular Movements: Extraocular movements intact.   Neurological:      Cranial Nerves: Dysarthria present.       NIHSS    1a  Level of consciousness: 1=not alert but arousable by minor stimulation to obey, answer or respond   1b. LOC questions:  0=Performs both tasks correctly   1c. LOC commands: 0=Performs both tasks correctly   2.  Best Gaze: 0=normal   3. Visual: 1=Partial hemianopia   4. Facial Palsy: 0=Normal symmetric movement   5a. Motor Left Arm: 1=Drift, limb holds 90 (or 45) degrees but drifts down before full 10 seconds: does not hit bed   5b.  Motor Right Arm: 0=No drift, limb holds 90 (or 45) degrees for full 10 seconds   6a. Motor Left Le=Some effort against gravity, limb cannot get to or maintain (if cured) 90 (or 45) degrees, drifts down to bed, but has some effort against gravity   6b  Motor Right Le=No drift, limb holds 90 (or 45) degrees for full 10 seconds   7. Limb Ataxia: 0=Absent   8.  Sensory: 1=Mild to moderate sensory loss; patient feels pinprick is less sharp or is dull on the affected side; there is a loss of superficial pain with pinprick but patient is aware She is being touched   9. Best Language:  0=No aphasia, normal   10. Dysarthria: 1=Mild to moderate, patient slurs at least some words and at worst, can be understood with some difficulty   11. Extinction and Inattention: 0=No abnormality          Total:   7        Recent Labs  Results from last 72 hours   Lab Units 24  0605 24  0640 24  0919   SODIUM mmol/L 138 135* 134*   POTASSIUM  mmol/L 3.7 3.8 4.7   BUN mg/dL 23 24* 22   CREATININE mg/dL 1.00 0.88 0.92   CALCIUM mg/dL 8.4* 9.0 9.6   MAGNESIUM mg/dL 2.02 2.08 2.33      Results from last 72 hours   Lab Units 09/21/24  0605 09/20/24  0640 09/19/24  0919   WBC AUTO x10*3/uL 8.9 9.6 9.5   HEMOGLOBIN g/dL 12.0 11.7* 12.6   HEMATOCRIT % 38.8 35.3* 38.8   PLATELETS AUTO x10*3/uL 202 259 291            Lab Results   Component Value Date    HGBA1C 7.2 (H) 09/20/2024    HGBA1C 7.9 (H) 04/18/2024    HGBA1C 5.9 (H) 11/08/2023    LDLF CANCELED 02/23/2023    LDLF 108 (H) 02/08/2021    LDLF 53 07/27/2020          Results from last 7 days   Lab Units 09/20/24  0640   HEMOGLOBIN A1C % 7.2*     BNP   Date/Time Value Ref Range Status   09/08/2022 04:47 PM 86 0 - 99 pg/mL Final     Comment:     .  <100 pg/mL - Heart failure unlikely  100-299 pg/mL - Intermediate probability of acute heart  .               failure exacerbation. Correlate with clinical  .               context and patient history.    >=300 pg/mL - Heart Failure likely. Correlate with clinical  .               context and patient history.  BNP testing is performed using different testing   methodology at [facility] than at other   [facility]. Direct result comparisons should   only be made within the same method.         Imaging  MRI head results: MR brain w and wo IV contrast    Result Date: 9/17/2024  There is similar mild brain parenchymal volume loss when compared with 06/05/2024.   An area of encephalomalacia is again noted within the right frontal lobe convexity. The gradient echo T2 images again demonstrate a small amount of curvilinear blooming dark signal along the margins of the area of encephalomalacia suggesting associated hemosiderin deposition anterior dystrophic calcification/mineralization.   The high-resolution MRI images of the orbits again demonstrate asymmetric atrophy and increased STIR signal within the left optic nerve when compared with the right similar  when compared with the prior MRI of the orbits dated 06/05/2024. Again, no corresponding abnormal enhancement is identified.   The intracranial MRV is within normal limits without evidence of venous sinus thrombosis.     I personally reviewed the images/study and I agree with the findings as stated by Dr. Brody Daley M.D. This study was interpreted at [place], [place].   MACRO: None.   Signed by: Rob Mendes 9/17/2024 5:36 AM Dictation workstation:   [id number]    MR brain w and wo IV contrast    Result Date: 6/5/2024  1. Left optic nerve atrophy unchanged from 07/28/2023 MR. No acute abnormality of the right optic nerve. 2. No acute intracranial ischemia or abnormal enhancement. 3. Right frontal lobe encephalomalacia/gliosis similar to 07/28/2023 MR.   I personally reviewed the images/study and I agree with the findings as stated. This study was interpreted at [place], [place].   MACRO: None   Signed by: Stephanie Dockery 6/5/2024 10:27 PM Dictation workstation:   [id number]    MR brain wo IV contrast    Result Date: 11/7/2023  IMPRESSION: No acute intracranial abnormality including no evidence of an acute or recent brain parenchymal infarct. Transcribed Using Voice Recognition Transcribe Date/Time: Nov 7 2023  7:18P Dictated by: AYDIN BOOTH DO This examination was interpreted and the report reviewed and electronically signed by: AYDIN BOOTH DO on Nov 7 2023  7:24PM  EST   CT head results: CT head wo IV contrast    Result Date: 9/2/2024  1.  No acute intracranial hemorrhage or acute territorial infarct.         MACRO: None.   Signed by: Lawanda Elias 9/2/2024 4:08 AM Dictation workstation:   [id number]    CT head wo IV contrast    Addendum Date: 8/28/2024    Interpreted By:  Stephanie Dockery, ADDENDUM: DISREGARD PRIOR REPORT INTENDED FOR DIFFERENT IMAGING.   CORRECTED REPORT FOR HEAD CT Accession  [id number]  as below   Technique: Axial, coronal and sagittal sequences through the head   Findings:   Redemonstrated region of encephalomalacia within the periphery of the right frontal lobe with punctate coarse calcification again noted. Additional mild nonspecific white matter changes and minimal parenchymal volume loss. No mass effect or midline shift.   The ventricles, sulci and basal cisterns enlarged, concordant with parenchymal volume loss.   No intracranial hemorrhage.   No displaced calvarial fracture.   Visualized paranasal sinuses and mastoid air cells are patent.   Soft tissues appear unremarkable.   Impression:   No acute intracranial hemorrhage or mass effect.   Chronic changes within the right frontal lobe, similar to prior imaging.   Signed by: Stephanie Dockery 8/28/2024 3:03 AM   -------- ORIGINAL REPORT -------- Dictation workstation:   [id number]    Result Date: 8/28/2024  Interval closed reduction and casting of acute tibial and fibular fractures with minimal persistent displacement as detailed.   Casting material partially limits evaluation of soft tissues however subcutaneous air is not excluded at the level of the distal tibial fracture and clinical correlation for possible open fracture suggested.     MACRO: None   Signed by: Stephanie Dockery 8/28/2024 2:39 AM Dictation workstation:   [id number]    CT head wo IV contrast    Result Date: 8/6/2024  No evidence for significant stenosis of the cervical vessels.   No evidence for significant stenosis or large branch vessel cutoffs of the intracranial vessels.   Chronic gliosis in the right superior frontal lobe and right frontal sherley hole unchanged. No evidence of acute cortical infarct or acute intracranial hemorrhage.   MACRO: None   Signed by: John Mcnamara 8/6/2024 8:46 PM Dictation workstation:   [id number]    CT head wo IV contrast    Result Date: 7/9/2024  No acute intracranial abnormality.   Stable encephalomalacia in the right superior frontal  lobe.   Signed by: John Mcnamara 7/9/2024 6:22 PM Dictation workstation:   [id number]    CT head wo IV contrast    Result Date: 11/7/2023  IMPRESSION: No evidence of an acute intracranial process. Focal RIGHT frontal lobe encephalomalacia deep to a sherley hole, new from 02/25/2020. CRITICAL TEST/RESULTS: Notification initiated at  11/7/2023 04:26 .   Communicated with RIRI ROSARIO on  11/7/2023 04:26 . CR_1 : ELIDA   Transcribe Date/Time: Nov 7 2023  4:25A Dictated by : JG GERARD MD This examination was interpreted and the report reviewed and electronically signed by: JG GERARD MD on Nov 7 2023  4:28AM  EST   Echocardiography results: No echocardiogram results found for the past 12 months  EEG results: No EEG results found for the past 12 months   Stroke Alert CT/MRI review: Was interpreted as it was being performed     IV Thrombolysis IV Thrombolysis Checklist      IV Thrombolysis Given: No; Thrombolysis contraindication reason: Working diagnosis is NOT a suspected ischemic stroke    Assessment/Plan   Jonathan Ayala is a 48-year-old right-handed female w/ a complex neurological PMHx of 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 Stroke Neurology consulted for new-onset left-sided numbness and weakness. NIHSS 7 for LOC 1, partial HH, LUE drift, LLE effort versus gravity, mild dysarthria, and partial sensory loss. She also endorsed right-sided facial numbness instead at one point during this clinical change.    Differential diagnoses of CSVT (pulsatile tinnitus), acute ischemic stroke, conversion  disorder, and hemiplegic migraine. Her history of intracranial hemorrhage was neither spontaneous nor hypertensive, so was judged not to be an absolute contraindication to TNK. CT Head and Angio Head & Neck were negative for any definitive acute process. Her presentation remained unclear and her IVIG infusions put her at increased risk for hypercoagulable state that could predispose her to an acute ischemic stroke. Obtained hyperacute MRI brain to definitively evaluate for acute ischemic stroke and it was negative for an acute infarction.    Recommendations:  No further stroke imaging, work-up, or interventions needed at this time  Migraine Cocktail PRN  Rest of care per General Neurology    Carolina Li MD  PGY-3 Child Neurology  Stroke [phone]    Recommendations are not final until formally staffed with the attending physician, Dr. Donato.

## 2024-09-21 NOTE — PROGRESS NOTES
"Jonathan Ayala is a 48 y.o. female on day 4 of admission presenting with Vision changes.    Subjective   Jonathan Ayala is a 48 y.o. 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      Interval Updates:  Sugars continue to be elevated, her glucose this morning was 293.    Morning encounter:   This morning, her nurse messaged me due to BP 90/58 and a rapid was not called. The patient did feel dizzy when sitting up that resolved when lying down. Patient endorses a headache and nausea this morning that are also improved when lying down in bed. The headache started last night and is the reason she did not eat dinner last night. She states the headache does not feel like her past migraines but feels like the headache she had before that resolved with the LP. She currently would prefer to have a procedure over trying Diamox. She got her home Advair but is still having a scratchy throat. Denies any infectious symptoms. She states she had a BM yesterday.      Objective     Last Recorded Vitals  Blood pressure 90/58, pulse 103, temperature 35.7 °C (96.3 °F), temperature source Temporal, resp. rate 18, height 1.575 m (5' 2\"), weight 118 kg (261 lb), SpO2 93%.    Physical Exam  HENT:      Head: Normocephalic.   Eyes:      General: Lids are normal.      Extraocular Movements: Extraocular movements intact.   Cardiovascular:      Pulses: Normal pulses.      Heart sounds: Normal heart sounds.   Pulmonary:      Effort: Pulmonary effort is normal.      Breath sounds: Normal breath sounds.   Neurological:      Mental Status: She is alert.      Motor: Motor strength is " normal.     Deep Tendon Reflexes:      Reflex Scores:       Bicep reflexes are 2+ on the right side and 2+ on the left side.       Brachioradialis reflexes are 2+ on the right side and 2+ on the left side.       Patellar reflexes are 1+ on the right side and 1+ on the left side.       Achilles reflexes are 1+ on the right side and 1+ on the left side.  Psychiatric:         Speech: Speech normal.       Neurological Exam  Mental Status  Alert. Oriented to person, place and time. Recent and remote memory are intact. Speech is normal. Attention and concentration are normal.    Cranial Nerves  CN II: Right visual acuity: Finger movement. Left visual acuity: Finger movement. Right homonymous hemianopsia. Left normal visual field.  CN III, IV, VI: Extraocular movements intact bilaterally. Normal lids and orbits bilaterally.   Right pupil: 3 mm.   Left pupil: 3 mm. Pain endorsed with rightward gaze in right eye..  CN V: Facial sensation is normal.  CN VII: Full and symmetric facial movement.  CN VIII: Hearing is normal.  CN IX, X: Palate elevates symmetrically  CN XI: Shoulder shrug strength is normal.  CN XII: Tongue midline without atrophy or fasciculations.  Right eye with blurry vision in the nasal inferior. Some blurriness in R superior temporal quadrant. Left eye with baseline decreased peripheral visual acuity. Pain in the right eye when looking to the horizontal extremes.    Motor  Normal muscle bulk throughout. No fasciculations present. Normal muscle tone. Strength is 5/5 throughout all four extremities.    Sensory  Pinprick is normal in upper and lower extremities. Diminished cold sensation in distal lower extremities.. Vibration abnormality: Decreased vibration in distal lower extremities -- upon re-examination, some vibration intact in upper body but diminished in feet and toes. . Proprioception is normal in upper and lower extremities.     Reflexes                                            Right                       Left  Brachioradialis                    2+                         2+  Biceps                                 2+                         2+  Patellar                                1+                         1+  Achilles                                1+                         1+    Coordination  Right: Finger-to-nose normal. Rapid alternating movement normal. Heel-to-shin normal.    Gait  Normal casual, toe, heel and tandem gait.  Romberg was negative. .    Results from last 72 hours   Lab Units 09/21/24  0605 09/20/24  0640   WBC AUTO x10*3/uL 8.9 9.6   NRBC AUTO /100 WBCs 0.0 0.0   RBC AUTO x10*6/uL 4.22 4.09   HEMOGLOBIN g/dL 12.0 11.7*   HEMATOCRIT % 38.8 35.3*   MCV fL 92 86   MCH pg 28.4 28.6   MCHC g/dL 30.9* 33.1   RDW % 14.6* 14.2   PLATELETS AUTO x10*3/uL 202 259   NEUTROS PCT AUTO % 73.6 81.7   IG PCT AUTO % 0.4 0.8   LYMPHS PCT AUTO % 16.9 11.6   MONOS PCT AUTO % 8.9 5.8   EOS PCT AUTO % 0.1 0.0   BASOS PCT AUTO % 0.1 0.1   NEUTROS ABS x10*3/uL 6.57 7.82*   IG AUTO x10*3/uL 0.04 0.08   LYMPHS ABS AUTO x10*3/uL 1.51 1.11*   MONOS ABS AUTO x10*3/uL 0.80 0.56   EOS ABS AUTO x10*3/uL 0.01 0.00   BASOS ABS AUTO x10*3/uL 0.01 0.01      Results from last 72 hours   Lab Units 09/21/24 0605 09/20/24  0640   GLUCOSE mg/dL 182* 347*   SODIUM mmol/L 138 135*   POTASSIUM mmol/L 3.7 3.8   CHLORIDE mmol/L 98 99   CO2 mmol/L 31 29   ANION GAP mmol/L 13 11   BUN mg/dL 23 24*   CREATININE mg/dL 1.00 0.88   EGFR mL/min/1.73m*2 71 82   CALCIUM mg/dL 8.4* 9.0   PHOSPHORUS mg/dL 2.8 2.4*   ALBUMIN g/dL 3.7 3.9   MAGNESIUM mg/dL 2.02 2.08      Results from last 72 hours   Lab Units 09/21/24  0605 09/20/24  0640   ALBUMIN g/dL 3.7 3.9              MRI Brain w and wo IV contrast, MRI Orbit w and wo IV contrast, MRV w and wo IV contrast 9/17  There is similar mild brain parenchymal volume loss when compared  with 06/05/2024.      An area of encephalomalacia is again noted within the right frontal  lobe convexity. The  gradient echo T2 images again demonstrate a small  amount of curvilinear blooming dark signal along the margins of the  area of encephalomalacia suggesting associated hemosiderin deposition  anterior dystrophic calcification/mineralization.      The high-resolution MRI images of the orbits again demonstrate  asymmetric atrophy and increased STIR signal within the left optic  nerve when compared with the right similar when compared with the  prior MRI of the orbits dated 06/05/2024. Again, no corresponding  abnormal enhancement is identified.      The intracranial MRV is within normal limits without evidence of  venous sinus thrombosis.    Scheduled medications  acetaminophen, 650 mg, oral, Once  amitriptyline, 100 mg, oral, Nightly  brimonidine, 1 drop, Both Eyes, BID  clonazePAM, 0.5 mg, oral, BID  divalproex, 500 mg, oral, BID  enoxaparin, 40 mg, subcutaneous, Daily  ezetimibe, 10 mg, oral, Daily  fluticasone propion-salmeteroL, 2 puff, inhalation, BID  folic acid, 1 mg, oral, Daily  furosemide, 40 mg, oral, BID  hydrocortisone, 10 mg, oral, q AM  insulin glargine, 50 Units, subcutaneous, q AM  insulin lispro, 0-10 Units, subcutaneous, TID  insulin lispro, 15 Units, subcutaneous, TID  lacosamide, 250 mg, oral, BID  levETIRAcetam, 1,500 mg, oral, BID  levothyroxine, 75 mcg, oral, Daily before breakfast  montelukast, 10 mg, oral, Nightly  polyethylene glycol, 17 g, oral, Daily  propranolol, 20 mg, oral, TID  rOPINIRole, 0.5 mg, oral, q AM  rOPINIRole, 1 mg, oral, Nightly  sennosides, 1-2 tablet, oral, Nightly      Continuous medications     PRN medications  PRN medications: [DISCONTINUED] acetaminophen **OR** acetaminophen, dextrose, dextrose, dextrose, glucagon, glucagon, glucagon, ipratropium-albuteroL, ondansetron ODT **OR** ondansetron      Assessment/Plan      Assessment & Plan  Vision changes    Jonathan Ayala is a 48 y.o. right handed female w/ a complex neurological PMHx of [facility] (dx 2/2022 w/ OP 25) s/p R  frontal bolt c/b tract hemorrhage and SAH and focal epilepsy, right hemiplegic migraines, and other PMHx of CVID on monthly IVIG infusions, Sjogren's syndrome/scleroderma, POTS, T2DM, HTN, hypothyroidism, BRENT on CPAP, anxiety/depression admitted for a 1 month history of right eye blurred vision. . In terms of etiology for unilateral optic disc edema, atypical optic neuritis remains on the differential vs neuro-retinitis.  Lumbar puncture mostly noticeable for elevated protein and opening pressure of 52, but no pleocytosis with predominant cell type. Headache was greatly improved after LP and vision continues to improve. This makes the differential of IIH more enticing, however autoimmune/oprtic neuritis is still on the differential. Will work on ruling out sarcoidosis in terms of autoimmune causes of decreased vision and discuss with ophthalmology.    #R optic disc edema   #c/f Optic neuritis vs IIH  ::Follows with Dr Mederos outpatient  ::9/17 Serum toxoplasma IgG noreactive  ::9/18 Syphilis Ab reactive, RPR nonreactive (most likely Ab from IVIG)  ::9/18 LP opening pressure 52, WBC 6 (36% PMNs, 29% lymphocytes), 1000 RBC (Tube 1), protein 79, glucose 154 (POCT glucose >300), closing pressure 22, meningitis panel negative, HSV negative, VDRL nonreactive, cytology without malignant cells, flow cytometry without discrete population of B cells, negative for oligoclonal banding, elevated IgG  - s/p IV Solumedrol 1g daily for three days (last dose 9/19 6AM)  - follow up CNS demyelinating panel  - follow up rest of LP results  - CT chest w IV contrast to assess for sarcoidosis  - consulted NSGY to discuss risk/benefits of VPS  - Allergy: planning for admission to ICU/NSU for Diamox desensitization       #IIH s/o R frontal bolt 2022 c/b focal epilepsy   #R hemiplegic migraines   - c/w divalproex 500 mg BID   - c/w lasix 40mg BID  - c/w Lacosamide 250 mg BID   - c/w Keppra 1500 mg BID   - strict I&Os for volume  status    #Hypothyroidism   - c/w Levothyroxine 75 mcg daily      #Asthma   - c/w motelukast 10 mg daily   - c/w duonebs PRN   - c/w Breo Ellipta daily      #CVID   #Sjorgen's syndrome   - IVIG infusions every 14 days?, last infusion 09/12     #POTS  - propranolol 20mg TID  - will give 500 ml LR bolus this morning for hypotension    #HLD   - c/w Zetia 10 mg daily      #T2DM   - lantus 50 qAM, lispro 7 TID  - Endocrine consulted, appreciate recs  - Glucose checks AC HS   - hypoglycemia protocol      MISC: c/w ropinirole BID (0.5mg qAM, 1mg qPM)      F: PRN  E: PRN  N: Regular diet (gluten, lactose free)  A: PIV      O2: Room air   Bowel Regimen: Miralax, Sennokot nightly    DVT ppx: Lovenox     Code Status: FULL CODE     Shannen Walsh MD  Department of Neurology, PGY-1  General [id number]     -----------------------------------------------------------------------------------------------------------------------------  ATTENDING ATTESTATION    I saw patient with trainee and agree with the edits, history and exam that I helped formulate per above. In addition:    In afternoon time, BAT called as patient reported worsening headache, left face and limb tinglilng and had left sided weakness. Work-up included vessel imaging as well as MRI Brain. This did not reveal evidence of acute stroke.     For IIH, ongoing discussions regarding management. Plan has been set-up for diamox challenge with allergy/immunology teams input given this is listed as an allergy for patient and patient reports she did not tolerate this medication in the past. NSGY has discussed with patient as well information regarding shunt.     Ciara Elder MD  Neuromuscular Neurology  [facility]  Office Phone Number: [phone]

## 2024-09-21 NOTE — SIGNIFICANT EVENT
09/21/24 1629   Onset Documentation   Rapid Response Initiated By Physician   Location/Room [facility]  (LT [id number])   Pager Time 1627   Arrival Time 1629   Event End Time 1721   Level II Called No   Primary Reason for Call BAT - see stroke narrator     Rapid Response Note    I responded to BAT called for left sided weakness and left hemisensory deficit.  Last known well was 1530.  Stroke neurology at the bedside.  NIHSS performed for 7.  Baseline NIHSS is 0.  Patient with history of complex migraines with RIGHT hemiplegia.  Also with history of tract hemorrhage and SAH from [place] in 2022.  CT Head and Neck w/wo contrast ordered.  Required new angiocath in CT due to inability for mediport to handle contrast.  20 gauge placed in right ac.  No evidence of LVO.  Patient still TNK candidate but stroke attending requesting STAT MRI.  Awaiting availability of MRI.

## 2024-09-21 NOTE — PROGRESS NOTES
"Jonathan Ayala is a 48 y.o. female on day 4 of admission presenting with Vision changes.    Subjective   Jonathan Ayala is a 48 y.o. 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      Interval Updates:  Sugars continue to be elevated, given extra doses of short acting insulin.    Morning encounter:   Patient endorses a slight headache this morning and some pain when looking the the horizontal extremes (right eye). Sore throat due to not having normal home inhalers. Denies any infectious symptoms.      Objective     Temp:  [35.3 °C (95.5 °F)-36.2 °C (97.2 °F)] 35.4 °C (95.7 °F)  Heart Rate:  [85-97] 85  Resp:  [16-18] 16  BP: (109-150)/(74-88) 109/74      Physical Exam  HENT:      Head: Normocephalic.   Eyes:      General: Lids are normal.      Extraocular Movements: Extraocular movements intact.   Cardiovascular:      Pulses: Normal pulses.      Heart sounds: Normal heart sounds.   Pulmonary:      Effort: Pulmonary effort is normal.      Breath sounds: Normal breath sounds.   Neurological:      Mental Status: She is alert.      Motor: Motor strength is normal.     Deep Tendon Reflexes:      Reflex Scores:       Bicep reflexes are 2+ on the right side and 2+ on the left side.       Brachioradialis reflexes are 2+ on the right side and 2+ on the left side.       Patellar reflexes are 1+ on the right side and 1+ on the left side.       Achilles reflexes are 1+ on the right side and 1+ on the left side.  Psychiatric:         Speech: Speech normal.       Neurological Exam  Mental Status  Alert. Oriented to person, place and time. Recent and remote memory " are intact. Speech is normal. Attention and concentration are normal.    Cranial Nerves  CN II: Right visual acuity: Finger movement. Left visual acuity: Finger movement. Right homonymous hemianopsia. Left normal visual field.  CN III, IV, VI: Extraocular movements intact bilaterally. Normal lids and orbits bilaterally.   Right pupil: 3 mm.   Left pupil: 3 mm. Pain endorsed with rightward gaze in right eye..  CN V: Facial sensation is normal.  CN VII: Full and symmetric facial movement.  CN VIII: Hearing is normal.  CN IX, X: Palate elevates symmetrically  CN XI: Shoulder shrug strength is normal.  CN XII: Tongue midline without atrophy or fasciculations.  Right eye with blurry vision in the nasal inferior. Some blurriness in R superior temporal quadrant. Left eye with baseline decreased peripheral visual acuity. Pain in the right eye when looking to the horizontal extremes.    Motor  Normal muscle bulk throughout. No fasciculations present. Normal muscle tone. Strength is 5/5 throughout all four extremities.    Sensory  Pinprick is normal in upper and lower extremities. Diminished cold sensation in distal lower extremities.. Vibration abnormality: Decreased vibration in distal lower extremities -- upon re-examination, some vibration intact in upper body but diminished in feet and toes. . Proprioception is normal in upper and lower extremities.     Reflexes                                            Right                      Left  Brachioradialis                    2+                         2+  Biceps                                 2+                         2+  Patellar                                1+                         1+  Achilles                                1+                         1+    Coordination  Right: Finger-to-nose normal. Rapid alternating movement normal. Heel-to-shin normal.    Gait  Normal casual, toe, heel and tandem gait.  Romberg was negative. .    Results from last 72 hours   Lab  Units 09/21/24  0605 09/20/24  0640   WBC AUTO x10*3/uL 8.9 9.6   NRBC AUTO /100 WBCs 0.0 0.0   RBC AUTO x10*6/uL 4.22 4.09   HEMOGLOBIN g/dL 12.0 11.7*   HEMATOCRIT % 38.8 35.3*   MCV fL 92 86   MCH pg 28.4 28.6   MCHC g/dL 30.9* 33.1   RDW % 14.6* 14.2   PLATELETS AUTO x10*3/uL 202 259   NEUTROS PCT AUTO % 73.6 81.7   IG PCT AUTO % 0.4 0.8   LYMPHS PCT AUTO % 16.9 11.6   MONOS PCT AUTO % 8.9 5.8   EOS PCT AUTO % 0.1 0.0   BASOS PCT AUTO % 0.1 0.1   NEUTROS ABS x10*3/uL 6.57 7.82*   IG AUTO x10*3/uL 0.04 0.08   LYMPHS ABS AUTO x10*3/uL 1.51 1.11*   MONOS ABS AUTO x10*3/uL 0.80 0.56   EOS ABS AUTO x10*3/uL 0.01 0.00   BASOS ABS AUTO x10*3/uL 0.01 0.01      Results from last 72 hours   Lab Units 09/21/24  0605 09/20/24  0640   GLUCOSE mg/dL 182* 347*   SODIUM mmol/L 138 135*   POTASSIUM mmol/L 3.7 3.8   CHLORIDE mmol/L 98 99   CO2 mmol/L 31 29   ANION GAP mmol/L 13 11   BUN mg/dL 23 24*   CREATININE mg/dL 1.00 0.88   EGFR mL/min/1.73m*2 71 82   CALCIUM mg/dL 8.4* 9.0   PHOSPHORUS mg/dL 2.8 2.4*   ALBUMIN g/dL 3.7 3.9   MAGNESIUM mg/dL 2.02 2.08      Results from last 72 hours   Lab Units 09/21/24  0605 09/20/24  0640   ALBUMIN g/dL 3.7 3.9              MRI Brain w and wo IV contrast, MRI Orbit w and wo IV contrast, MRV w and wo IV contrast 9/17  There is similar mild brain parenchymal volume loss when compared  with 06/05/2024.      An area of encephalomalacia is again noted within the right frontal  lobe convexity. The gradient echo T2 images again demonstrate a small  amount of curvilinear blooming dark signal along the margins of the  area of encephalomalacia suggesting associated hemosiderin deposition  anterior dystrophic calcification/mineralization.      The high-resolution MRI images of the orbits again demonstrate  asymmetric atrophy and increased STIR signal within the left optic  nerve when compared with the right similar when compared with the  prior MRI of the orbits dated 06/05/2024. Again, no  corresponding  abnormal enhancement is identified.      The intracranial MRV is within normal limits without evidence of  venous sinus thrombosis.    Scheduled medications  amitriptyline, 100 mg, oral, Nightly  brimonidine, 1 drop, Both Eyes, BID  clonazePAM, 0.5 mg, oral, BID  divalproex, 500 mg, oral, BID  enoxaparin, 40 mg, subcutaneous, Daily  ezetimibe, 10 mg, oral, Daily  fluticasone propion-salmeteroL, 2 puff, inhalation, BID  folic acid, 1 mg, oral, Daily  furosemide, 40 mg, oral, BID  hydrocortisone, 10 mg, oral, q AM  insulin glargine, 50 Units, subcutaneous, q AM  insulin lispro, 0-10 Units, subcutaneous, TID  insulin lispro, 15 Units, subcutaneous, TID  lacosamide, 250 mg, oral, BID  levETIRAcetam, 1,500 mg, oral, BID  levothyroxine, 75 mcg, oral, Daily before breakfast  montelukast, 10 mg, oral, Nightly  polyethylene glycol, 17 g, oral, Daily  propranolol, 20 mg, oral, TID  rOPINIRole, 0.5 mg, oral, q AM  rOPINIRole, 1 mg, oral, Nightly  sennosides, 1-2 tablet, oral, Nightly      Continuous medications     PRN medications  PRN medications: [DISCONTINUED] acetaminophen **OR** acetaminophen, dextrose, dextrose, dextrose, glucagon, glucagon, glucagon, ipratropium-albuteroL, ondansetron ODT **OR** ondansetron      Assessment/Plan      Assessment & Plan  Vision changes    Jonathan Ayala is a 48 y.o. 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 . In terms of etiology for unilateral optic disc edema, atypical optic neuritis remains on the differential vs neuro-retinitis.  Lumbar puncture mostly noticeable for elevated protein and opening pressure of 52, but no pleocytosis with  predominant cell type. Headache was greatly improved after LP and vision continues to improve. This makes the differential of IIH more enticing, however autoimmune/oprtic neuritis is still on the differential. Will work on ruling out sarcoidosis in terms of autoimmune causes of decreased vision and discuss with ophthalmology.    #R optic disc edema   #c/f Optic neuritis vs IIH  ::Follows with Dr Mederos outpatient  ::9/17 Serum toxoplasma IgG noreactive  ::9/18 Syphilis Ab reactive, RPR nonreactive (most likely Ab from IVIG)  ::9/18 LP opening pressure 52, WBC 6 (36% PMNs, 29% lymphocytes), 1000 RBC (Tube 1), protein 79, glucose 154 (POCT glucose >300), closing pressure 22, meningitis panel negative, HSV negative, VDRL nonreactive, cytology without malignant cells, flow cytometry without discrete population of B cells  - s/p IV Solumedrol 1g daily for three days (last dose 9/19 6AM)  - follow up CNS demyelinating panel  - follow up rest of LP results  - CT chest w IV contrast to assess for sarcoidosis  - consulted NSGY to discuss risk/benefits of VPS  - Allergy: planning for admission to ICU/NSU for Diamox desensitization     #IIH s/o R frontal bolt 2022 c/b focal epilepsy   #R hemiplegic migraines   - c/w divalproex 500 mg BID   - c/w lasix 40mg BID  - c/w Lacosamide 250 mg BID   - c/w Keppra 1500 mg BID      #Hypothyroidism   - c/w Levothyroxine 75 mcg daily      #Asthma   - c/w motelukast 10 mg daily   - c/w duonebs PRN   - c/w Breo Ellipta daily      #CVID   #Sjorgen's syndrome   - IVIG infusions every 14 days?, last infusion 09/12     #POTS  - propranolol 20mg TID     #HLD   - c/w Zetia 10 mg daily      #T2DM   - lantus 50 qAM, lispro 7 TID  - Endocrine consulted, appreciate recs  - Glucose checks AC HS   - hypoglycemia protocol      MISC: c/w ropinirole BID (0.5mg qAM, 1mg qPM)      F: PRN  E: PRN  N: Regular diet (gluten free)  A: PIV      O2: Room air   Bowel Regimen: Sennokot nightly   Last BM Date: 09/18/24    DVT ppx: Lovenox, held in anticipation of LP       Code Status: FULL CODE         Pamela Portillo MD    -----------------------------------------------------------------------------------------------------------------------------  ATTENDING ATTESTATION    DUPLICATE NOTE ENTRY FOR TODAY. PLEASE REFER TO OTHER NOTE DATED TODAY FOR MY ATTESTATION.    Ciara Elder MD  Neuromuscular Neurology  [facility]  Office Phone Number: [phone]      05-Nov-2020 11:52

## 2024-09-21 NOTE — CARE PLAN
Problem: Pain - Adult  Goal: Verbalizes/displays adequate comfort level or baseline comfort level  Outcome: Progressing     Problem: Safety - Adult  Goal: Free from fall injury  Outcome: Progressing   The patient's goals for the shift include      The clinical goals for the shift include pt remain sfe and free from falls tharamisughout shfit. BSand BP remain within pt goal

## 2024-09-22 VITALS
TEMPERATURE: 96.3 F | HEIGHT: 62 IN | OXYGEN SATURATION: 93 % | DIASTOLIC BLOOD PRESSURE: 87 MMHG | SYSTOLIC BLOOD PRESSURE: 127 MMHG | WEIGHT: 261 LBS | BODY MASS INDEX: 48.03 KG/M2 | RESPIRATION RATE: 18 BRPM | HEART RATE: 88 BPM

## 2024-09-22 PROBLEM — G93.2 IIH (IDIOPATHIC INTRACRANIAL HYPERTENSION): Status: ACTIVE | Noted: 2024-09-16

## 2024-09-22 PROBLEM — E11.65 TYPE 2 DIABETES MELLITUS WITH HYPERGLYCEMIA, WITH LONG-TERM CURRENT USE OF INSULIN: Status: ACTIVE | Noted: 2023-07-04

## 2024-09-22 LAB
ALBUMIN SERPL BCP-MCNC: 3.9 G/DL (ref 3.4–5)
ANION GAP SERPL CALC-SCNC: 16 MMOL/L (ref 10–20)
BASOPHILS # BLD AUTO: 0.01 X10*3/UL (ref 0–0.1)
BASOPHILS NFR BLD AUTO: 0.1 %
BUN SERPL-MCNC: 18 MG/DL (ref 6–23)
CALCIUM SERPL-MCNC: 9.3 MG/DL (ref 8.6–10.6)
CHLORIDE SERPL-SCNC: 96 MMOL/L (ref 98–107)
CO2 SERPL-SCNC: 27 MMOL/L (ref 21–32)
CREAT SERPL-MCNC: 0.92 MG/DL (ref 0.5–1.05)
EGFRCR SERPLBLD CKD-EPI 2021: 78 ML/MIN/1.73M*2
EOSINOPHIL # BLD AUTO: 0 X10*3/UL (ref 0–0.7)
EOSINOPHIL NFR BLD AUTO: 0 %
ERYTHROCYTE [DISTWIDTH] IN BLOOD BY AUTOMATED COUNT: 14.5 % (ref 11.5–14.5)
GLUCOSE BLD MANUAL STRIP-MCNC: 210 MG/DL (ref 74–99)
GLUCOSE BLD MANUAL STRIP-MCNC: 221 MG/DL (ref 74–99)
GLUCOSE BLD MANUAL STRIP-MCNC: 276 MG/DL (ref 74–99)
GLUCOSE SERPL-MCNC: 316 MG/DL (ref 74–99)
HCT VFR BLD AUTO: 38.8 % (ref 36–46)
HGB BLD-MCNC: 12.7 G/DL (ref 12–16)
IMM GRANULOCYTES # BLD AUTO: 0.02 X10*3/UL (ref 0–0.7)
IMM GRANULOCYTES NFR BLD AUTO: 0.2 % (ref 0–0.9)
LYMPHOCYTES # BLD AUTO: 0.99 X10*3/UL (ref 1.2–4.8)
LYMPHOCYTES NFR BLD AUTO: 12.2 %
MAGNESIUM SERPL-MCNC: 2.09 MG/DL (ref 1.6–2.4)
MCH RBC QN AUTO: 28 PG (ref 26–34)
MCHC RBC AUTO-ENTMCNC: 32.7 G/DL (ref 32–36)
MCV RBC AUTO: 86 FL (ref 80–100)
MONOCYTES # BLD AUTO: 0.55 X10*3/UL (ref 0.1–1)
MONOCYTES NFR BLD AUTO: 6.8 %
NEUTROPHILS # BLD AUTO: 6.56 X10*3/UL (ref 1.2–7.7)
NEUTROPHILS NFR BLD AUTO: 80.7 %
NRBC BLD-RTO: 0 /100 WBCS (ref 0–0)
PHOSPHATE SERPL-MCNC: 3.2 MG/DL (ref 2.5–4.9)
PLATELET # BLD AUTO: 259 X10*3/UL (ref 150–450)
POTASSIUM SERPL-SCNC: 3.8 MMOL/L (ref 3.5–5.3)
RBC # BLD AUTO: 4.54 X10*6/UL (ref 4–5.2)
SODIUM SERPL-SCNC: 135 MMOL/L (ref 136–145)
WBC # BLD AUTO: 8.1 X10*3/UL (ref 4.4–11.3)

## 2024-09-22 PROCEDURE — 82947 ASSAY GLUCOSE BLOOD QUANT: CPT

## 2024-09-22 PROCEDURE — 2500000004 HC RX 250 GENERAL PHARMACY W/ HCPCS (ALT 636 FOR OP/ED)

## 2024-09-22 PROCEDURE — 2500000001 HC RX 250 WO HCPCS SELF ADMINISTERED DRUGS (ALT 637 FOR MEDICARE OP)

## 2024-09-22 PROCEDURE — 83520 IMMUNOASSAY QUANT NOS NONAB: CPT

## 2024-09-22 PROCEDURE — 1100000001 HC PRIVATE ROOM DAILY

## 2024-09-22 PROCEDURE — 2500000001 HC RX 250 WO HCPCS SELF ADMINISTERED DRUGS (ALT 637 FOR MEDICARE OP): Performed by: STUDENT IN AN ORGANIZED HEALTH CARE EDUCATION/TRAINING PROGRAM

## 2024-09-22 PROCEDURE — 99254 IP/OBS CNSLTJ NEW/EST MOD 60: CPT | Performed by: STUDENT IN AN ORGANIZED HEALTH CARE EDUCATION/TRAINING PROGRAM

## 2024-09-22 PROCEDURE — 2500000002 HC RX 250 W HCPCS SELF ADMINISTERED DRUGS (ALT 637 FOR MEDICARE OP, ALT 636 FOR OP/ED)

## 2024-09-22 PROCEDURE — 85025 COMPLETE CBC W/AUTO DIFF WBC: CPT

## 2024-09-22 PROCEDURE — 99232 SBSQ HOSP IP/OBS MODERATE 35: CPT | Performed by: STUDENT IN AN ORGANIZED HEALTH CARE EDUCATION/TRAINING PROGRAM

## 2024-09-22 PROCEDURE — 99232 SBSQ HOSP IP/OBS MODERATE 35: CPT | Performed by: PSYCHIATRY & NEUROLOGY

## 2024-09-22 PROCEDURE — 80069 RENAL FUNCTION PANEL: CPT

## 2024-09-22 PROCEDURE — 83735 ASSAY OF MAGNESIUM: CPT

## 2024-09-22 RX ORDER — INSULIN LISPRO 100 [IU]/ML
18 INJECTION, SOLUTION INTRAVENOUS; SUBCUTANEOUS
Status: DISCONTINUED | OUTPATIENT
Start: 2024-09-22 | End: 2024-09-25 | Stop reason: HOSPADM

## 2024-09-22 ASSESSMENT — COGNITIVE AND FUNCTIONAL STATUS - GENERAL
DAILY ACTIVITIY SCORE: 22
MOBILITY SCORE: 24
HELP NEEDED FOR BATHING: A LITTLE
DRESSING REGULAR LOWER BODY CLOTHING: A LITTLE

## 2024-09-22 ASSESSMENT — PAIN SCALES - GENERAL: PAINLEVEL_OUTOF10: 0 - NO PAIN

## 2024-09-22 NOTE — PROGRESS NOTES
"Jonathan Ayala is a 48 y.o. female on day 5 of admission presenting with Vision changes.    Subjective   No complaints    Objective     Physical Exam  Ox3  R 5/5  LUE 5/5  LLE 4/5    Last Recorded Vitals  Blood pressure 105/68, pulse 100, temperature 36.1 °C (97 °F), resp. rate 16, height 1.575 m (5' 2\"), weight 118 kg (261 lb), SpO2 93%.  Intake/Output last 3 Shifts:  I/O last 3 completed shifts:  In: 150 (1.3 mL/kg) [P.O.:150]  Out: 300 (2.5 mL/kg) [Urine:300 (0.1 mL/kg/hr)]  Weight: 118.4 kg     Relevant Results    Assessment/Plan   Principal Problem:    Vision changes    48yF h/o IIH (dx 2/2022 w/ OP 25) s/p RF bolt (Lottie) c/b tract hemorrhage, SAH, and focal epilepsy, R hemiplegic migraines, optic neuropathy, CVID (IVIG qmonthly), Sjogren's syndrome, Scleroderma, fibromyalgia, chronic pain syndrome, POTS, T2DM, HTN, hypothyroidism, BRENT on CPAP, morbid obesity, GERD, gastroparesis s/p pyeloplasty, NAFLD, anxiety, depression, 9/17 p/w 1 month of R eye blurry vision, and intermittent pulsatile tinnitus, MRI brain RF encephalomalacia, MR orbit STIR signal within L optic nerve, MRV negative, 9/18 s/p LP (OP 52), 9/21 s/p BAT for L hemiplegia, CTH/CTA stable RF encephalomalacia, no LVO, MRI neg    Recommendations  Neurology primary  OR Tues 9/24 for R VPS   Please obtain medicine consult for preop clearance, and pulm consult regarding further work up scleroderma/sarcoid and OR clearance    Jesus Lambert MD      "

## 2024-09-22 NOTE — PROGRESS NOTES
"Jonathan Ayala is a 48 y.o. female on day 5 of admission presenting with Vision changes.      Subjective   Patient sitting up at bedside table, trying to text on her phone.   She reports feeling much better today, headache is more tolerable. She is glad she is getting VPS surgery       Objective     Last Recorded Vitals  Blood pressure 101/71, pulse 92, temperature 35.3 °C (95.5 °F), temperature source Temporal, resp. rate 18, height 1.575 m (5' 2\"), weight 118 kg (261 lb), SpO2 94%.    Physical Exam  Base Eye Exam       Visual Acuity (Snellen - Linear)         Right Left    Near sc 20/20 20/70+2 phni              Tonometry (Goldmann Applanation, 12:02 PM)         Right Left    Pressure 18 16              Pupils         Dark Light Shape React APD    Right 5 3 Round Brisk None    Left 5 3 Round Brisk +              Visual Fields (Counting fingers)         Left Right                                Extraocular Movement         Right Left     Full Full              Neuro/Psych       Oriented x3: Yes    Mood/Affect: Normal                  Additional Tests       Color         Right Left    Ishihara 14/14 0/14                  Slit Lamp and Fundus Exam       External Exam         Right Left    External Normal Normal              Slit Lamp Exam         Right Left    Lids/Lashes Normal Normal                           Assessment/Plan   Assessment & Plan  Vision changes    IIH (idiopathic intracranial hypertension)      This 48 year-old woman with a history of left non-arteritic anterior ischemic optic neuropathy,  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     Seen in clinic with Dr. Mederos on 9/16/24 with new grade 4 right optic disc edema. Differential diagnosis includes sequential non-arteritic anterior " ischemic optic neuropathy, but also optic neuritis, both typical and atypical forms, as well as intracranial pressure (ICP) elevation given her history of idiopathic intracranial hypertension, neuro-retinitis. Testing so far without evidence of enhancement of inflammatory lesions, no VST suggesting against inflammatory etiology, however laboratory studies for atypical optic neuritis still pending. Planning for VPS w NSGY 9/24.      Update 9/22  - Entrance testing stable today  - Per neurosurgery, tentatively plan for shunt placement Tuesday pending OR clearance documentation from medicine/pulm   - Allergy planning for desensitization to diamox- deferred now due to OR planning for VPS     #Unilateral optic disc edema, right eye  - MRI Brain and orbits and MRV without enhancement of the optic nerve, demyelinating lesions, or VST  - Chest Xray without infiltrate or hilar adenopathy  - Laboratory workup so far unremarkable.  - Lumbar puncture 9/18 with elevated opening pressure to 52 mmHg, elevated protein, with borderline WBC of 6 (no cell type predominant)  - Agree with CT chest to evaluate for sarcoidosis- pending final read  - Continue IV solumedrol 1g q24h (9/17/24 -9/19 ) x 3 days per neurology  - Continue furosemide 40mg BID for elevated intracranial pressure (ICP).  - Allergy consulted for desensitization to diamox   - Discussed with patient option for VPS given her elevated intracranial pressure (ICP)   - Patient wishes to pursue VPS, NSGY tentatively planning for Tuesday OR     #Ocular hypertension, both eyes  - Possibly related to systemic steroid therapy  - Defer timolol for now in setting of asthma  - Continue brimonidine BID, both eyes      Ophthalmology to continue to follow while inpatient     Thank you for the consult. Note not final until attending signature. Please contact the Ophthalmology service with further questions or concerns.     Pager: [phone]              Kia Robins MD

## 2024-09-22 NOTE — ASSESSMENT & PLAN NOTE
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 Endocrine was consulted on 9/20/24  for management of Inpatient Hyperglycemia.       Diabetes history:  -Diagnosed 2002 -2003  -Follows with Dr. Felix and her group OP (Last seen on 9/16 -Stefany.Upcoming appointment on 10/10/23 - Stefany)  -Diabetes Home Regimen:   Toujeo  50 units AM   Carb Ratio - 1:2 TID (Undercarb estimating)  Sliding Scale -  1 unit for every 30 > 200 and  2 units for every  30 >200 at bedtime.   -Diabetes Complications:   Gastroparesis/ Peripheral Neuropathy   -Monitors blood sugar via Dexcom G7   -Previously on Tandem - short period of time- reportedly hypoglycemia.   With Omnipod recently - adhesive rxn.   -Previous treatment:   Ozempic and Metformin (Had GI side effects)    Glucocorticoid during hospital stay:  -Patient received 3 doses of 1 g of methylprednisolone.  Last dose was on 9/18  Which resulted in significant hyperglycemia.  -Dexamethasone 4 mg on 9/21 at bedtime    Of note, she is planned for  shunt placement on 9/24 for elevated ICP    Type 2 diabetes: Poorly controlled in setting of acute illness, glucocorticoid use, and stress  Recommendations:  - CONTINUE glargine 50 units every 24 hours  - INCREASE lispro to 18 units 3 times daily with meals  - CONTINUE lispro #2 insulin sliding scale (2 units for every 50 above 150) 3 times daily with meals only  -Accu-Cheks 3 times daily before meals and at bedtime (please obtain a bedtime check as  it is often missed)  -Hypoglycemia protocol  -Carb consistent diet 60    Plan was communicated to the primary team    Ruth Combs MD  Endocrinology, PGY 5  Pager [phone] or secure chat     Case seen, examined, and discussed with Dr. Campoverde

## 2024-09-22 NOTE — PROGRESS NOTES
"Jonathan Ayala is a 48 y.o. female on day 5 of admission presenting with Vision changes.    Subjective   Patient reports that she had a very small lunch yesterday and missed dinner yesterday as well.  Therefore her bedtime blood sugar was tightly controlled at 109.  Dexamethasone 4 mg was administered overnight due to an episode of hemiparesis.  She reports that she had something to eat overnight because she missed her 2 meals.  This a.m. she is hyperglycemic at over 270.  No further dexamethasone is planned.  She is planned for  shunt on 9/24    Objective   Last Recorded Vitals  Blood pressure 101/71, pulse 92, temperature 35.3 °C (95.5 °F), temperature source Temporal, resp. rate 18, height 1.575 m (5' 2\"), weight 118 kg (261 lb), SpO2 94%.  Constitutional: Middle-aged  female, obese, NAD, well groomed. AOx3. Cooperative  HEENT: EOMI, Anicteric scleras   Neck: Soft, supple   Cardiovascular: normal HR  Respiratory: no increased wob,  or accessory muscle use.  Psych : appropriate affect       Intake/Output last 3 Shifts:  I/O last 3 completed shifts:  In: 150 (1.3 mL/kg) [P.O.:150]  Out: 800 (6.8 mL/kg) [Urine:800 (0.2 mL/kg/hr)]  Weight: 118.4 kg     Relevant Results  Results from last 7 days   Lab Units 09/22/24  1206 09/22/24  0857 09/21/24  2147 09/21/24  1335 09/21/24  0817 09/21/24  0605 09/21/24  0205 09/20/24  0833 09/20/24  0640 09/19/24  1216 09/19/24  0919 09/18/24  1532 09/18/24  0920   POCT GLUCOSE mg/dL  --  276* 109* 281* 293*  --  143*   < >  --    < >  --    < >  --    GLUCOSE mg/dL 316*  --   --   --   --  182*  --   --  347*  --  401*  --  298*    < > = values in this interval not displayed.     .  Results from last 7 days   Lab Units 09/22/24  1206 09/21/24  0605 09/20/24  0640 09/17/24  0250 09/16/24  1934   SODIUM mmol/L 135* 138 135*   < > 138   POTASSIUM mmol/L 3.8 3.7 3.8   < > 4.2   CHLORIDE mmol/L 96* 98 99   < > 101   CO2 mmol/L 27 31 29   < > 29   BUN mg/dL 18 23 24*   < > 15 "   CREATININE mg/dL 0.92 1.00 0.88   < > 0.90   CALCIUM mg/dL 9.3 8.4* 9.0   < > 9.5   PROTEIN TOTAL g/dL  --   --   --   --  8.2   BILIRUBIN TOTAL mg/dL  --   --   --   --  0.3   ALK PHOS U/L  --   --   --   --  74   ALT U/L  --   --   --   --  13   AST U/L  --   --   --   --  15   GLUCOSE mg/dL 316* 182* 347*   < > 183*    < > = values in this interval not displayed.     .  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 Endocrine was consulted on 9/20/24  for management of Inpatient Hyperglycemia.       Diabetes history:  -Diagnosed 2002 -2003  -Follows with Dr. Felix and her group OP (Last seen on 9/16 -Fetzner.Upcoming appointment on 10/10/23 - Fetzner)  -Diabetes Home Regimen:   Toujeo  50 units AM   Carb Ratio - 1:2 TID (Undercarb estimating)  Sliding Scale -  1 unit for every 30 > 200 and  2 units for every  30 >200 at bedtime.   -Diabetes Complications:   Gastroparesis/ Peripheral Neuropathy   -Monitors blood sugar via Dexcom G7   -Previously on Tandem - short period of time- reportedly hypoglycemia.   With Omnipod recently - adhesive rxn.    -Previous treatment:   Ozempic and Metformin (Had GI side effects)    Glucocorticoid during hospital stay:  -Patient received 3 doses of 1 g of methylprednisolone.  Last dose was on 9/18  Which resulted in significant hyperglycemia.  -Dexamethasone 4 mg on 9/21 at bedtime    Of note, she is planned for  shunt placement on 9/24 for elevated ICP    Type 2 diabetes: Poorly controlled in setting of acute illness, glucocorticoid use, and stress  Recommendations:  - CONTINUE glargine 50 units every 24 hours  - INCREASE lispro to 18 units 3 times daily with meals  - CONTINUE lispro #2 insulin sliding scale (2 units for every 50 above 150) 3 times daily with meals only  -Accu-Cheks 3 times daily before meals and at bedtime (please obtain a bedtime check as it is often missed)  -Hypoglycemia protocol  -Carb consistent diet 60    Plan was communicated to the primary team    Ruth Combs MD  Endocrinology, PGY 5  Pager [phone] or secure chat     Case seen, examined, and discussed with Dr. Campoverde

## 2024-09-22 NOTE — HOSPITAL COURSE
Jonathan Ayala is a 48 y.o. 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 . In terms of etiology for unilateral optic disc edema, atypical optic neuritis remains on the differential vs neuro-retinitis. Lumbar puncture mostly noticeable for elevated protein and opening pressure of 52, but no pleocytosis with predominant cell type. Headache was greatly improved after LP and vision continues to improve. This makes the differential of IIH more enticing. VPS placement with NSGY on 9/24. No complications from this procedure.    Hospital course complicated by episode of L sided paresthesias and weakness iso headache on 9/21. CT scans and MRI negative for stroke. Headache treated with IVF, dexamethasone, and zofran with improvement.    Patient ambulating, eating, drinking and toileting well. Discharged home on 9/25 with follow ups scheduled.    Medications on Discharge:  Hydrocortisone:  9/26: Take 20mg (2 tablets) in the morning, take 10mg (1 tablet) at 4PM  9/27: Take 20mg (2 tablets) in the morning, take 10mg (1 tablet) at 4PM  9/28 until your Endocrine appointment (10/10): Take 10mg (1 tablet) in the morning, take 5mg (0.5 tablet) at 4PM    Follow Ups:  Neurosurgery: post  shunt care/follow up  Endocrinology: follow up adrenal insufficiency, hydrocortisone dosing, and diabetes management  Ophthalmology: follow up improvement of vision

## 2024-09-22 NOTE — SIGNIFICANT EVENT
Stroke Neurology: Sign Off     Ms. 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     Stroke Neurology consulted 9/21 for new-onset left-sided numbness and weakness. NIHSS 7 for LOC 1, partial HH, LUE drift, LLE effort versus gravity, mild dysarthria, and partial sensory loss. She also endorsed right-sided facial numbness instead at one point during this clinical change.     CTH/CTA H/N unremarkable  Hyperacute MRI no diffusion restriction     Management of migraine per general neurology team. No further workup per stroke.     Stroke will sign off.     Erwin Ndiaye DO   PGY4 Neurology

## 2024-09-22 NOTE — CONSULTS
Reason For Consult  Preop optimization    History Of Present Illness  Jonathan Ayala is a 48 y.o. 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  She is planned for placement of VPS on 9/24 with nsgy, pulmonary is consulted for preop-optimization regarding her diagnosis of asthma.    Patient reports at baseline her symptoms have been well-controlled, currently on advair and uses duonebs as needed for dyspnea as an outpatient.  Follows with Dr. Love at [facility], last seen 8/30/2024.  No hospitalizations over the past few years for asthma (was in the ICU related to covid in 2020, but this is her only hospitalization), and has never required mechanical ventilation for an asthma exacerbation.  Denies significant cough, hemoptysis, or wheezing.  Denies significant dyspnea, thinks that she would have some difficulty climbing a flight of stairs and need to pause, but reports this is not different from her baseline.  No dyspnea with ambulation, and states that her breathing is at baseline.  Has a slightly hoarse voice that she attributes to a reaction to cleaning chemicals in the hospital.       Past Medical History  She has a past medical history of Abnormal findings on diagnostic imaging of other abdominal regions, including retroperitoneum (10/14/2020), Acquired deformity of nose (03/24/2022), Acute upper respiratory infection, unspecified (10/16/2019), Allergy status to unspecified drugs, medicaments and biological substances (05/22/2020), Allergy status to unspecified drugs, medicaments and biological substances (11/13/2020), Benign intracranial hypertension (01/27/2022), Bipolar disorder, unspecified (Multi), Breast  calcification, right (08/21/2018), Cellulitis of abdominal wall (09/28/2022), Cervicalgia (07/01/2020), Chronic maxillary sinusitis (01/04/2022), Chronic sialoadenitis (03/16/2020), COVID-19 (01/06/2022), Decreased white blood cell count, unspecified (11/04/2019), Disease of thyroid gland, Disturbances of salivary secretion (03/16/2020), Dry eye syndrome of bilateral lacrimal glands (10/07/2022), Encounter for preprocedural cardiovascular examination (02/01/2022), Food additives allergy status (06/11/2020), Fracture of nasal bones, initial encounter for closed fracture (03/03/2022), Granuloma of right orbit (10/07/2021), History of endometrial ablation (11/09/2017), Hyperlipidemia, Hypertension, Hyperthyroidism, Hypoglycemia, Hypothyroidism, Localized swelling, mass and lump, head (03/24/2022), Major depressive disorder, recurrent, in full remission (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), Migraine, Nipple discharge (08/24/2022), Ocular pain, right eye (10/07/2022), Optic atrophy, Other abnormal and inconclusive findings on diagnostic imaging of breast (07/06/2020), Other anomalies of pupillary function (05/31/2019), Other chest pain (05/18/2020), Other conditions influencing health status (08/01/2022), Other conditions influencing health status (08/03/2021), Other specified disorders of eustachian tube, left ear (11/18/2019), Other specified disorders of nose and nasal sinuses (03/24/2022), Other specified disorders of nose and nasal sinuses (03/24/2022), Pelvic and perineal pain (07/06/2020), Personal history of other diseases of the circulatory system (04/07/2020), Personal history of other diseases of the circulatory system (04/07/2020), Personal history of other diseases of the circulatory system, Personal history of other diseases of the digestive system, Personal history of other diseases of the digestive system (03/02/2020), Personal history of other diseases of the  musculoskeletal system and connective tissue (01/19/2022), Personal history of other diseases of the musculoskeletal system and connective tissue (03/02/2021), Personal history of other diseases of the musculoskeletal system and connective tissue (06/16/2020), Personal history of other diseases of the nervous system and sense organs (11/18/2019), Personal history of other diseases of the nervous system and sense organs (09/21/2022), Personal history of other diseases of the respiratory system (04/14/2021), Personal history of other endocrine, nutritional and metabolic disease (02/17/2021), Personal history of other mental and behavioral disorders (05/27/2021), Personal history of other specified conditions (09/07/2022), Personal history of other specified conditions (10/16/2019), Personal history of other specified conditions (09/28/2022), Personal history of other specified conditions (09/16/2021), Personal history of other specified conditions (02/01/2022), Personal history of other specified conditions (03/09/2022), Personal history of other specified conditions (02/12/2014), Personal history of other specified conditions (10/27/2021), Personal history of other specified conditions (10/16/2019), Personal history of other specified conditions (02/26/2021), Personal history of other specified conditions (02/22/2021), Personal history of urinary calculi, Polycystic ovary syndrome, Postural orthostatic tachycardia syndrome (POTS), Rash and other nonspecific skin eruption (03/15/2022), Repeated falls (06/23/2021), Right lower quadrant pain (10/14/2020), Sjogren syndrome, unspecified (Multi), Sjogren's syndrome (Multi), Slow transit constipation (07/09/2020), Subarachnoid hemorrhage, traumatic (Multi) (04/19/2023), Thyroid nodule, Traumatic subarachnoid hemorrhage with loss of consciousness of unspecified duration, subsequent encounter (03/15/2022), Traumatic subarachnoid hemorrhage without loss of consciousness,  subsequent encounter, Type 2 diabetes mellitus (Multi), Unspecified disorder of refraction (10/07/2022), Unspecified optic neuritis (11/06/2020), Unspecified optic neuritis (11/06/2020), Unspecified visual loss (09/25/2019), Venous insufficiency (chronic) (peripheral) (10/18/2021), Viral infection, unspecified (01/11/2022), Vitamin D deficiency, and Vitamin D deficiency, unspecified (09/28/2022).    Surgical History  She has a past surgical history that includes Other surgical history (08/22/2019); Other surgical history (08/22/2019); Other surgical history (08/22/2019); Other surgical history (08/22/2019); Other surgical history (08/22/2019); Other surgical history (08/22/2019); MR angio neck wo IV contrast (02/08/2021); MR angio head wo IV contrast (02/08/2021); [place] tooth extraction (2004); Appendectomy (2017); Hysterectomy (2017); and Hernia repair (Right, 03/01/2024).     Social History  She reports that she has never smoked. She has never used smokeless tobacco. She reports that she does not currently use alcohol. She reports current drug use. Drug: Benzodiazepines.    Family History  Family History   Problem Relation Name Age of Onset    Other (Perforated bowel) Mother Radha     Hypertension Mother Radha     Macular degeneration Mother Radha     Hyperlipidemia Father Mac     Hypertension Father Mac     Heart attack Father Mac     Other (S/P CABG) Father Mac     Diabetes Father Mac     Prostate cancer Father Mac     Coronary artery disease Father Mac     Heart failure Father Mac     Stroke Father Mac     Cancer Father Mac     Macular degeneration Father Mac     Retinal detachment Father Mac     Stroke Sister Jazmin     Breast cancer Sister Jazmin     Lupus Sister Jazmin     Ovarian cancer Sister Jazmin     Astigmatism Sister Jazmin     Hyperlipidemia Brother Sanchez     Hypertension Brother Sanchez     Astigmatism Brother Sanchez     Diabetes Father's Sister Linda     Blindness Father's Sister Linda   "   Thyroid cancer Father's Sister Bekah     Thyroid disease Father's Sister Jessica     Colon cancer Father's Brother ?     Blindness Other Multiple     Melanoma Other      Asthma Other      Other (hay fever) Other      Allergies Other      Cancer Maternal Grandmother Brenda     Cancer Father's Brother Kian         Allergies  Acetazolamide, Atorvastatin, Cefdinir, Ceftriaxone, Doxepin, Duloxetine, Levofloxacin, Levofloxacin in d5w, Nutritional supplements, Ozempic [semaglutide], Prochlorperazine, Red dye, Rosemary, Rosemary oil, Strawberry, Sulfa (sulfonamide antibiotics), Topiramate, Tree pollen-black walnut, Tree pollen-pecan, [place], Aripiprazole, Aspartame, Aspartame (bulk), Fenofibrate, Gluten, Hydrochlorothiazide, Hydromorphone, Iron, Meclizine, Metformin, Propoxyphene, Statins-hmg-coa reductase inhibitors, Tetracyclines, Thiazides, Venlafaxine, Wheat, Adhesive tape-silicones, Barbiturates, Ciprofloxacin, Dhe, Diet foods, Farxiga [dapagliflozin], Ferrous sulfate, Nsaids (non-steroidal anti-inflammatory drug), Other, Sulfonylureas, Tobramycin, Vancomycin, Adhesive, Azithromycin, Betamethasone, House dust, Metoclopramide hcl, Prednisone, Propoxyphene-acetaminophen, Sulfacetamide sodium, Qipeqynq-0-qd8 antimigraine agents, and Zolpidem    Review of Systems  10 point ROS was obtained and is negative except for as stated above     Physical Exam  General: well appearing in NAD  HENT: atraumatic, normocephalic  Respiratory: CTAB, no excessive respiratory effort on RA, hoarse voice noted  Cardio: RRR, no mrg  GI: NT/ND  Extremities: WWP with no edema  Neuro: no gross deficits  Psych: cooperative      Last Recorded Vitals  Blood pressure 101/71, pulse 92, temperature 35.3 °C (95.5 °F), temperature source Temporal, resp. rate 18, height 1.575 m (5' 2\"), weight 118 kg (261 lb), SpO2 94%.    Relevant Results  CT chest high resolution    Result Date: 9/22/2024  Interpreted By:  Jaclyn Dawson, STUDY: CT CHEST HIGH " RESOLUTION;  9/20/2024 6:15 pm   INDICATION: Signs/Symptoms:Assess for sarcoidosis/ autoimmune disease.   COMPARISON: CT dated 02/13/2022   ACCESSION NUMBER(S): [id number]   ORDERING CLINICIAN: DULCE DAMON   TECHNIQUE: Using helical multidetector technique, volumetric data acquisition of the chest was obtained without intravenous administration of contrast material under the high resolution chest CT protocol. Examination includes contiguous slices through the chest in supine positioning during inspiration, noncontiguous axial slices in supine positioning during expiration and prone positioning during inspiration.   FINDINGS: LUNGS AND AIRWAYS: The trachea and central airways are patent. No endobronchial lesion is seen.   There are bandlike areas of atelectasis in the right lower lobe and lingula.   The bilateral lungs are clear without evidence of focal consolidation, pleural effusion, or pneumothorax.   Minimal/mild air trapping in the expiratory phase images. Prone imaging reveals improvement of bibasilar atelectasis and no evidence of pulmonary fibrosis.   MEDIASTINUM AND XIOMARA, LOWER NECK AND AXILLA: The visualized thyroid gland is within normal limits. No evidence of thoracic lymphadenopathy by CT criteria. Esophagus appears within normal limits as seen.   HEART AND VESSELS: The thoracic aorta normal in course and caliber. There is aberrant right subclavian artery with retroesophageal course. Main pulmonary artery and its branches are normal in caliber. No coronary artery calcifications are seen. Please note, the study is not optimized for evaluation of coronary arteries. The cardiac chambers are not enlarged. There is no pericardial effusion seen.   A right IJ approach central venous catheter is terminating in the right atrium.   UPPER ABDOMEN: The visualized subdiaphragmatic structures demonstrate no remarkable findings.       CHEST WALL AND OSSEOUS STRUCTURES: Chest wall is within normal limits. No acute  osseous pathology.There are no suspicious osseous lesions.       1.  No CT findings suggestive of intrathoracic sarcoidosis. No CT evidence of acute process in the chest. 2. Minimal/mild air trapping in the expiratory phase images which may be due to component of airway disease.       MACRO: None   Signed by: Jaclyn Barajas 9/22/2024 3:33 PM Dictation workstation:   [id number]    MR brain wo IV contrast    Result Date: 9/22/2024  Interpreted By:  Jasen Antoine  and Edi Skinner STUDY: MR BRAIN WO IV CONTRAST;  9/21/2024 7:12 pm   INDICATION: Signs/Symptoms:Hyperacute MRI for stroke, TNK decision, LKW [id number].   COMPARISON: MRI brain on 09/17/2024. CT brain on 09/21/2024.   ACCESSION NUMBER(S): [id number]   ORDERING CLINICIAN: DULCE DAMON   TECHNIQUE: Axial T2, FLAIR, DWI, gradient echo T2 and sagittal and coronal T1 weighted images of brain were acquired.   FINDINGS: CSF Spaces: Mild diffuse sulcal and ventricular prominence, likely secondary to age-related parenchymal volume loss. No extra-axial fluid collection.   Parenchyma: There is no diffusion restriction abnormality to suggest acute infarct.  Redemonstration of encephalomalacia and gliosis within the superior right frontal lobe. Gradient echo images demonstrate curvilinear susceptibility artifact along the margins of this area of encephalomalacia, likely due to hemosiderin deposition and/or mineralization. Redemonstration of mild nonspecific T2/hyperintense signal throughout the periventricular white matter, likely representing sequelae of small-vessel ischemic disease. There is no mass effect or midline shift.   Paranasal Sinuses and Mastoids: Mild mucosal thickening of the inferior maxillary sinuses and scattered ethmoid air cells. Small bilateral mastoid effusions.       1. No evidence of acute infarct, intracranial mass effect or midline shift. 2. Stable encephalomalacia/gliosis within the right frontal lobe convexity, likely from prior infarct. 3.  Senescent changes of the brain, as detailed above.   I personally reviewed the images/study and I agree with the findings as stated by Dr. Brody Daley M.D. This study was interpreted at [facility], [place], [place].   MACRO: None   Signed by: Jasen Antoine 9/22/2024 7:28 AM Dictation workstation:   [id number]    CT brain attack angio head and neck W and WO IV contrast    Result Date: 9/21/2024  Interpreted By:  Jasen Antoine, STUDY: CT BRAIN ATTACK ANGIO HEAD AND NECK W AND WO IV CONTRAST; CT BRAIN ATTACK HEAD WO IV CONTRAST;  9/21/2024 5:04 pm   INDICATION: Signs/Symptoms:Left sided numbness and tingling; Signs/Symptoms:Bat.     COMPARISON: None.   ACCESSION NUMBER(S): [id number]; [id number]   ORDERING CLINICIAN: DULCE DAMON   TECHNIQUE: Unenhanced CT images of the head were obtained. Subsequently, bolus iodinated contrast was administered intravenously and axial images of the head and neck were acquired.  Coronal, sagittal, and 3-D reconstructions were provided for review.   FINDINGS: Findings noncontrast head CT: Stable encephalomalacia high right frontal lobe. No acute intracranial hemorrhage mass effect or midline shift. No hydrocephalus. No new territorial loss of gray-white differentiation. Visualized paranasal sinuses and mastoid air cells are clear.   Findings CT angiography neck: On limited examination through the upper chest note is made of aberrant origin of the right subclavian artery. The remainder of the great vessels are unremarkable in appearance. Common carotid arteries carotid bifurcations and cervical internal carotid arteries are patent. Vertebral arteries demonstrate minimal contour irregularity at the C2 level right-greater-than-left side for instance on image 478 series 401 which may due to artifact and/or minimal tortuosity rather than abnormality of other etiology such as subtle fibromuscular dysplasia. Incidental note is made of 3 mm anterolisthesis of  C4 over C5 without facet disruption most likely on a chronic/degenerative basis.   Findings CT angiography head: Distal internal carotid arteries are patent and branch appropriately into the anterior and middle cerebral arteries without evidence of hemodynamically significant stenosis or other abnormality identified. The distal vertebral arteries are patent and join appropriately to form the basilar artery which is diminutive in caliber perhaps in part due to minimal prominence of posterior communicating arteries. The proximal posterior cerebral arteries are patent.       Stable encephalomalacia high right frontal lobe. No definite acute intracranial abnormality on noncontrast head CT. No acute intracranial hemorrhage. No hemodynamically significant stenosis or large vessel occlusion was identified on CT angiography of the head and neck with subtle contour irregularity of the vertebral arteries at the level of the base of the C2 vertebral body of uncertain significance perhaps reflecting artifact and/or minimal tortuosity or subtle fibromuscular dysplasia for instance. Please note that MRI would be more sensitive and specific for recent ischemia if clinically suspected.   Note: Imaging was discussed with the referring team by radiology resident at the time of the evaluation.   MACRO: None   Signed by: Jasen Antoine 9/21/2024 5:34 PM Dictation workstation:   [id number]    CT brain attack head wo IV contrast    Result Date: 9/21/2024  Interpreted By:  Jasen Antoine, STUDY: CT BRAIN ATTACK ANGIO HEAD AND NECK W AND WO IV CONTRAST; CT BRAIN ATTACK HEAD WO IV CONTRAST;  9/21/2024 5:04 pm   INDICATION: Signs/Symptoms:Left sided numbness and tingling; Signs/Symptoms:Bat.     COMPARISON: None.   ACCESSION NUMBER(S): [id number]; [id number]   ORDERING CLINICIAN: DULCE DAMON   TECHNIQUE: Unenhanced CT images of the head were obtained. Subsequently, bolus iodinated contrast was administered intravenously and axial images of  the head and neck were acquired.  Coronal, sagittal, and 3-D reconstructions were provided for review.   FINDINGS: Findings noncontrast head CT: Stable encephalomalacia high right frontal lobe. No acute intracranial hemorrhage mass effect or midline shift. No hydrocephalus. No new territorial loss of gray-white differentiation. Visualized paranasal sinuses and mastoid air cells are clear.   Findings CT angiography neck: On limited examination through the upper chest note is made of aberrant origin of the right subclavian artery. The remainder of the great vessels are unremarkable in appearance. Common carotid arteries carotid bifurcations and cervical internal carotid arteries are patent. Vertebral arteries demonstrate minimal contour irregularity at the C2 level right-greater-than-left side for instance on image 478 series 401 which may due to artifact and/or minimal tortuosity rather than abnormality of other etiology such as subtle fibromuscular dysplasia. Incidental note is made of 3 mm anterolisthesis of C4 over C5 without facet disruption most likely on a chronic/degenerative basis.   Findings CT angiography head: Distal internal carotid arteries are patent and branch appropriately into the anterior and middle cerebral arteries without evidence of hemodynamically significant stenosis or other abnormality identified. The distal vertebral arteries are patent and join appropriately to form the basilar artery which is diminutive in caliber perhaps in part due to minimal prominence of posterior communicating arteries. The proximal posterior cerebral arteries are patent.       Stable encephalomalacia high right frontal lobe. No definite acute intracranial abnormality on noncontrast head CT. No acute intracranial hemorrhage. No hemodynamically significant stenosis or large vessel occlusion was identified on CT angiography of the head and neck with subtle contour irregularity of the vertebral arteries at the level of  the base of the C2 vertebral body of uncertain significance perhaps reflecting artifact and/or minimal tortuosity or subtle fibromuscular dysplasia for instance. Please note that MRI would be more sensitive and specific for recent ischemia if clinically suspected.   Note: Imaging was discussed with the referring team by radiology resident at the time of the evaluation.   MACRO: None   Signed by: Jasen Antoine 9/21/2024 5:34 PM Dictation workstation:   [id number]       Assessment/Plan   48 y.o. 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  She is planned for placement of VPS on 9/24 with nsgy, pulmonary is consulted for preop-optimization regarding her diagnosis of asthma.    Her breathing is currently at baseline and well-controlled with home advair and duonebs as needed.  Her ARISCAT score is 31 points (and note that these points are all related to surgery-specific factors rather than patient factors), which portends a 13% risk of post-operative pulmonary complications.  She is currently optimized from a pulmonary standpoint, although would defer her CVID risk assessment to immunology consult team.    Recommendations:  -Continue home advair and duonebs as needed perioperatively  -Please ensure patient has incentive spirometer and acapella at bedside post-operatively to reduce the risk of atelectasis and assist with mucus clearance  -Please ensure that anesthesia is aware that she has been told that she is a difficult intubation and usually requires video laryngoscopy    Thank you for this consult.  Pulmonary will sign off.    Patient seen, examined, and discussed with Dr. Ora Novak MD

## 2024-09-22 NOTE — PROGRESS NOTES
"Jonathan Ayala is a 48 y.o. female on day 5 of admission presenting with Vision changes.    Subjective   Jonathan Ayala is a 48 y.o. 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      Interval Updates:  Developed left paresthesias and weakness on 9/21 in the afternoon. CT scans and MRI negative for stroke. No TNK given. Headache requiring fluids, dex, and zofran.    Morning encounter:   Endorses paresthesias and weakness has improved. No longer has a headache. Denies infectious symptoms. Vision is better compared to prior day.      Objective     Last Recorded Vitals  Blood pressure 105/68, pulse 100, temperature 36.1 °C (97 °F), resp. rate 16, height 1.575 m (5' 2\"), weight 118 kg (261 lb), SpO2 93%.    Physical Exam  HENT:      Head: Normocephalic.   Eyes:      General: Lids are normal.      Extraocular Movements: Extraocular movements intact.   Cardiovascular:      Pulses: Normal pulses.      Heart sounds: Normal heart sounds.   Pulmonary:      Effort: Pulmonary effort is normal.      Breath sounds: Normal breath sounds.   Neurological:      Mental Status: She is alert.      Motor: Motor strength is normal.     Deep Tendon Reflexes:      Reflex Scores:       Bicep reflexes are 2+ on the right side and 2+ on the left side.       Brachioradialis reflexes are 2+ on the right side and 2+ on the left side.       Patellar reflexes are 1+ on the right side and 1+ on the left side.       Achilles reflexes are 1+ on the right side and 1+ on the left side.  Psychiatric:         Speech: Speech normal.       Neurological Exam  Mental Status  Alert. Oriented " to person, place and time. Recent and remote memory are intact. Speech is normal. Attention and concentration are normal.    Cranial Nerves  CN II: Right visual acuity: Finger movement. Left visual acuity: Finger movement. Right homonymous hemianopsia. Left normal visual field.  CN III, IV, VI: Extraocular movements intact bilaterally. Normal lids and orbits bilaterally.   Right pupil: 3 mm.   Left pupil: 3 mm. Pain endorsed with rightward gaze in right eye..  CN V: Facial sensation is normal.  CN VII: Full and symmetric facial movement.  CN VIII: Hearing is normal.  CN IX, X: Palate elevates symmetrically  CN XI: Shoulder shrug strength is normal.  CN XII: Tongue midline without atrophy or fasciculations.  Right eye with blurry vision in the nasal inferior. Left eye with baseline decreased peripheral visual acuity.    Motor  Normal muscle bulk throughout. No fasciculations present. Normal muscle tone. Strength is 5/5 throughout all four extremities.    Sensory  Pinprick is normal in upper and lower extremities. Diminished cold sensation in distal lower extremities.. Vibration abnormality: Decreased vibration in distal lower extremities -- upon re-examination, some vibration intact in upper body but diminished in feet and toes. . Proprioception is normal in upper and lower extremities.     Reflexes                                            Right                      Left  Brachioradialis                    2+                         2+  Biceps                                 2+                         2+  Patellar                                1+                         1+  Achilles                                1+                         1+    Coordination  Right: Finger-to-nose normal. Rapid alternating movement normal. Heel-to-shin normal.    Gait    Deferred.    Results from last 72 hours   Lab Units 09/21/24  0605 09/20/24  0640   WBC AUTO x10*3/uL 8.9 9.6   NRBC AUTO /100 WBCs 0.0 0.0   RBC AUTO x10*6/uL  4.22 4.09   HEMOGLOBIN g/dL 12.0 11.7*   HEMATOCRIT % 38.8 35.3*   MCV fL 92 86   MCH pg 28.4 28.6   MCHC g/dL 30.9* 33.1   RDW % 14.6* 14.2   PLATELETS AUTO x10*3/uL 202 259   NEUTROS PCT AUTO % 73.6 81.7   IG PCT AUTO % 0.4 0.8   LYMPHS PCT AUTO % 16.9 11.6   MONOS PCT AUTO % 8.9 5.8   EOS PCT AUTO % 0.1 0.0   BASOS PCT AUTO % 0.1 0.1   NEUTROS ABS x10*3/uL 6.57 7.82*   IG AUTO x10*3/uL 0.04 0.08   LYMPHS ABS AUTO x10*3/uL 1.51 1.11*   MONOS ABS AUTO x10*3/uL 0.80 0.56   EOS ABS AUTO x10*3/uL 0.01 0.00   BASOS ABS AUTO x10*3/uL 0.01 0.01      Results from last 72 hours   Lab Units 09/21/24  0605 09/20/24  0640   GLUCOSE mg/dL 182* 347*   SODIUM mmol/L 138 135*   POTASSIUM mmol/L 3.7 3.8   CHLORIDE mmol/L 98 99   CO2 mmol/L 31 29   ANION GAP mmol/L 13 11   BUN mg/dL 23 24*   CREATININE mg/dL 1.00 0.88   EGFR mL/min/1.73m*2 71 82   CALCIUM mg/dL 8.4* 9.0   PHOSPHORUS mg/dL 2.8 2.4*   ALBUMIN g/dL 3.7 3.9   MAGNESIUM mg/dL 2.02 2.08      Results from last 72 hours   Lab Units 09/21/24  0605 09/20/24  0640   ALBUMIN g/dL 3.7 3.9              MRI Brain w and wo IV contrast, MRI Orbit w and wo IV contrast, MRV w and wo IV contrast 9/17  There is similar mild brain parenchymal volume loss when compared  with 06/05/2024.      An area of encephalomalacia is again noted within the right frontal  lobe convexity. The gradient echo T2 images again demonstrate a small  amount of curvilinear blooming dark signal along the margins of the  area of encephalomalacia suggesting associated hemosiderin deposition  anterior dystrophic calcification/mineralization.      The high-resolution MRI images of the orbits again demonstrate  asymmetric atrophy and increased STIR signal within the left optic  nerve when compared with the right similar when compared with the  prior MRI of the orbits dated 06/05/2024. Again, no corresponding  abnormal enhancement is identified.      The intracranial MRV is within normal limits without evidence  of  venous sinus thrombosis.    Scheduled medications  acetaminophen, 650 mg, oral, Once  amitriptyline, 100 mg, oral, Nightly  brimonidine, 1 drop, Both Eyes, BID  clonazePAM, 0.5 mg, oral, BID  divalproex, 500 mg, oral, BID  enoxaparin, 40 mg, subcutaneous, Daily  ezetimibe, 10 mg, oral, Daily  fluticasone propion-salmeteroL, 2 puff, inhalation, BID  folic acid, 1 mg, oral, Daily  furosemide, 40 mg, oral, BID  hydrocortisone, 10 mg, oral, q AM  insulin glargine, 50 Units, subcutaneous, q AM  insulin lispro, 0-10 Units, subcutaneous, TID  insulin lispro, 15 Units, subcutaneous, TID  lacosamide, 250 mg, oral, BID  levETIRAcetam, 1,500 mg, oral, BID  levothyroxine, 75 mcg, oral, Daily before breakfast  montelukast, 10 mg, oral, Nightly  polyethylene glycol, 17 g, oral, Daily  propranolol, 20 mg, oral, TID  rOPINIRole, 0.5 mg, oral, q AM  rOPINIRole, 1 mg, oral, Nightly  sennosides, 1-2 tablet, oral, Nightly      Continuous medications     PRN medications  PRN medications: [DISCONTINUED] acetaminophen **OR** acetaminophen, dextrose, dextrose, dextrose, glucagon, glucagon, glucagon, ipratropium-albuteroL, ondansetron ODT **OR** ondansetron      Assessment/Plan      Assessment & Plan  Vision changes    Jonathan Ayala is a 48 y.o. 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 . In terms of etiology for unilateral optic disc edema, atypical optic neuritis remains on the differential vs neuro-retinitis.  Lumbar puncture mostly noticeable for elevated protein and opening pressure of 52, but no pleocytosis with predominant cell type. Headache was greatly improved after LP and vision continues to  improve. This makes the differential of IIH more enticing, however autoimmune/oprtic neuritis is still on the differential. Will work on ruling out sarcoidosis in terms of autoimmune causes of decreased vision and discuss with ophthalmology.    #R optic disc edema   #c/f Optic neuritis vs IIH  ::Follows with Dr Mederos outpatient  ::9/17 Serum toxoplasma IgG noreactive  ::9/18 Syphilis Ab reactive, RPR nonreactive (most likely Ab from IVIG)  ::9/18 LP opening pressure 52, WBC 6 (36% PMNs, 29% lymphocytes), 1000 RBC (Tube 1), protein 79, glucose 154 (POCT glucose >300), closing pressure 22, meningitis panel negative, HSV negative, VDRL nonreactive, cytology without malignant cells, flow cytometry without discrete population of B cells, negative for oligoclonal banding, elevated IgG  :: s/p IV Solumedrol 1g daily for three days (last dose 9/19 6AM)  - follow up CNS demyelinating panel  - follow up rest of LP results  - CT chest w IV contrast to assess for sarcoidosis -> needs final read  - OR with NSGY on 9/24 for VPS -> consult pulm for pulmonary clearance  - Allergy: deferring diamox desensitization in favor of VPS       #IIH s/o R frontal bolt 2022 c/b focal epilepsy   #R hemiplegic migraines   - c/w divalproex 500 mg BID   - c/w lasix 40mg BID, if hypotensive polus prn  - c/w Lacosamide 250 mg BID   - c/w Keppra 1500 mg BID   - strict I&Os for volume status    #Hypothyroidism   - c/w Levothyroxine 75 mcg daily      #Asthma   - c/w motelukast 10 mg daily   - c/w duonebs PRN   - c/w Breo Ellipta daily      #CVID   #Sjorgen's syndrome   - IVIG infusions every 14 days?, last infusion 09/12     #POTS  - propranolol 20mg TID  - will give 500 ml LR bolus this morning for hypotension    #HLD   - c/w Zetia 10 mg daily      #T2DM   - lantus 50 qAM, lispro 7 TID  - Endocrine consulted, appreciate recs  - Glucose checks AC HS   - hypoglycemia protocol      MISC: c/w ropinirole BID (0.5mg qAM, 1mg qPM)      F: PRN  E: PRN  N:  Carb controlled  A: PIV      O2: Room air   Bowel Regimen: Miralax, Sennokot nightly    DVT ppx: Lovenox     Code Status: FULL CODE     Shannen Faust MD  Department of Neurology, PGY-2  [facility] [id number]     -----------------------------------------------------------------------------------------------------------------------------  ATTENDING ATTESTATION    I saw patient with trainee and agree with the edits, history and exam that I helped formulate per above.    Per discussion with patient--she would like to pursue  shunt for treatment of IIH.    CT Chest read final--read is no findings supportive of intrathoracic sarcoidosis. Minimal/mild air trapping in the expiratory phase images which may be due to airway disease.    She does not want to pursue diamox challenge. This is listed as an allergy and patient reports she has not been able to tolerate this medication in the past.    She had onset of new neurologic deficit with hemiplegia and hemiparesthesias yesterday for which BAT was initiated and head imaging obtained--no signs of stroke on MRI study. Given migraine cocktail with improvement of headache she had been having and symptoms.     Today morning she denies any paresthesias.    Ciara Elder MD  Neuromuscular Neurology  [facility]  Office Phone Number: [phone]

## 2024-09-23 ENCOUNTER — APPOINTMENT (OUTPATIENT)
Dept: RADIOLOGY | Facility: HOSPITAL | Age: 49
End: 2024-09-23
Payer: MEDICAID

## 2024-09-23 ENCOUNTER — ANESTHESIA EVENT (OUTPATIENT)
Dept: OPERATING ROOM | Facility: HOSPITAL | Age: 49
End: 2024-09-23
Payer: MEDICAID

## 2024-09-23 ENCOUNTER — SPECIALTY PHARMACY (OUTPATIENT)
Dept: PHARMACY | Facility: CLINIC | Age: 49
End: 2024-09-23

## 2024-09-23 ENCOUNTER — APPOINTMENT (OUTPATIENT)
Dept: CARDIOLOGY | Facility: HOSPITAL | Age: 49
End: 2024-09-23
Payer: MEDICAID

## 2024-09-23 PROBLEM — T88.4XXA DIFFICULT INTUBATION: Status: ACTIVE | Noted: 2024-09-23

## 2024-09-23 LAB
ABO GROUP (TYPE) IN BLOOD: NORMAL
ALBUMIN SERPL BCP-MCNC: 4.1 G/DL (ref 3.4–5)
ANION GAP SERPL CALC-SCNC: 16 MMOL/L (ref 10–20)
ANTIBODY SCREEN: NORMAL
APPEARANCE UR: CLEAR
APTT PPP: 32 SECONDS (ref 27–38)
BASOPHILS # BLD AUTO: 0.01 X10*3/UL (ref 0–0.1)
BASOPHILS NFR BLD AUTO: 0.1 %
BILIRUB UR STRIP.AUTO-MCNC: NEGATIVE MG/DL
BUN SERPL-MCNC: 18 MG/DL (ref 6–23)
CALCIUM SERPL-MCNC: 9.2 MG/DL (ref 8.6–10.6)
CHLORIDE SERPL-SCNC: 96 MMOL/L (ref 98–107)
CO2 SERPL-SCNC: 26 MMOL/L (ref 21–32)
COLOR UR: ABNORMAL
CREAT SERPL-MCNC: 0.87 MG/DL (ref 0.5–1.05)
EGFRCR SERPLBLD CKD-EPI 2021: 83 ML/MIN/1.73M*2
EOSINOPHIL # BLD AUTO: 0.08 X10*3/UL (ref 0–0.7)
EOSINOPHIL NFR BLD AUTO: 0.9 %
ERYTHROCYTE [DISTWIDTH] IN BLOOD BY AUTOMATED COUNT: 14.4 % (ref 11.5–14.5)
FUNGUS SPEC CULT: NORMAL
FUNGUS SPEC FUNGUS STN: NORMAL
GLUCOSE BLD MANUAL STRIP-MCNC: 191 MG/DL (ref 74–99)
GLUCOSE BLD MANUAL STRIP-MCNC: 202 MG/DL (ref 74–99)
GLUCOSE BLD MANUAL STRIP-MCNC: 213 MG/DL (ref 74–99)
GLUCOSE BLD MANUAL STRIP-MCNC: 213 MG/DL (ref 74–99)
GLUCOSE BLD MANUAL STRIP-MCNC: 98 MG/DL (ref 74–99)
GLUCOSE SERPL-MCNC: 208 MG/DL (ref 74–99)
GLUCOSE UR STRIP.AUTO-MCNC: NORMAL MG/DL
HCT VFR BLD AUTO: 39.7 % (ref 36–46)
HGB BLD-MCNC: 12.6 G/DL (ref 12–16)
IMM GRANULOCYTES # BLD AUTO: 0.04 X10*3/UL (ref 0–0.7)
IMM GRANULOCYTES NFR BLD AUTO: 0.5 % (ref 0–0.9)
INR PPP: 1 (ref 0.9–1.1)
KETONES UR STRIP.AUTO-MCNC: ABNORMAL MG/DL
LEUKOCYTE ESTERASE UR QL STRIP.AUTO: NEGATIVE
LYMPHOCYTES # BLD AUTO: 1.86 X10*3/UL (ref 1.2–4.8)
LYMPHOCYTES NFR BLD AUTO: 21.6 %
MAGNESIUM SERPL-MCNC: 2.2 MG/DL (ref 1.6–2.4)
MCH RBC QN AUTO: 27.9 PG (ref 26–34)
MCHC RBC AUTO-ENTMCNC: 31.7 G/DL (ref 32–36)
MCV RBC AUTO: 88 FL (ref 80–100)
MONOCYTES # BLD AUTO: 0.51 X10*3/UL (ref 0.1–1)
MONOCYTES NFR BLD AUTO: 5.9 %
NEUTROPHILS # BLD AUTO: 6.12 X10*3/UL (ref 1.2–7.7)
NEUTROPHILS NFR BLD AUTO: 71 %
NITRITE UR QL STRIP.AUTO: NEGATIVE
NRBC BLD-RTO: 0 /100 WBCS (ref 0–0)
PH UR STRIP.AUTO: 5.5 [PH]
PHOSPHATE SERPL-MCNC: 2.4 MG/DL (ref 2.5–4.9)
PLATELET # BLD AUTO: 266 X10*3/UL (ref 150–450)
POTASSIUM SERPL-SCNC: 3.3 MMOL/L (ref 3.5–5.3)
PROT UR STRIP.AUTO-MCNC: NEGATIVE MG/DL
PROTHROMBIN TIME: 11.4 SECONDS (ref 9.8–12.8)
RBC # BLD AUTO: 4.51 X10*6/UL (ref 4–5.2)
RBC # UR STRIP.AUTO: NEGATIVE /UL
RH FACTOR (ANTIGEN D): NORMAL
SODIUM SERPL-SCNC: 135 MMOL/L (ref 136–145)
SP GR UR STRIP.AUTO: 1.02
UROBILINOGEN UR STRIP.AUTO-MCNC: NORMAL MG/DL
WBC # BLD AUTO: 8.6 X10*3/UL (ref 4.4–11.3)
WNV IGG CSF IA-ACNC: 0.75 IV
WNV IGM CSF IA-ACNC: 0.01 IV

## 2024-09-23 PROCEDURE — 2500000002 HC RX 250 W HCPCS SELF ADMINISTERED DRUGS (ALT 637 FOR MEDICARE OP, ALT 636 FOR OP/ED)

## 2024-09-23 PROCEDURE — 82947 ASSAY GLUCOSE BLOOD QUANT: CPT

## 2024-09-23 PROCEDURE — 2500000004 HC RX 250 GENERAL PHARMACY W/ HCPCS (ALT 636 FOR OP/ED)

## 2024-09-23 PROCEDURE — 99222 1ST HOSP IP/OBS MODERATE 55: CPT

## 2024-09-23 PROCEDURE — 2500000001 HC RX 250 WO HCPCS SELF ADMINISTERED DRUGS (ALT 637 FOR MEDICARE OP)

## 2024-09-23 PROCEDURE — 93005 ELECTROCARDIOGRAM TRACING: CPT

## 2024-09-23 PROCEDURE — 85025 COMPLETE CBC W/AUTO DIFF WBC: CPT

## 2024-09-23 PROCEDURE — 2500000001 HC RX 250 WO HCPCS SELF ADMINISTERED DRUGS (ALT 637 FOR MEDICARE OP): Performed by: STUDENT IN AN ORGANIZED HEALTH CARE EDUCATION/TRAINING PROGRAM

## 2024-09-23 PROCEDURE — 85610 PROTHROMBIN TIME: CPT

## 2024-09-23 PROCEDURE — 93010 ELECTROCARDIOGRAM REPORT: CPT | Performed by: INTERNAL MEDICINE

## 2024-09-23 PROCEDURE — 82374 ASSAY BLOOD CARBON DIOXIDE: CPT

## 2024-09-23 PROCEDURE — 86901 BLOOD TYPING SEROLOGIC RH(D): CPT

## 2024-09-23 PROCEDURE — 36415 COLL VENOUS BLD VENIPUNCTURE: CPT

## 2024-09-23 PROCEDURE — 83735 ASSAY OF MAGNESIUM: CPT

## 2024-09-23 PROCEDURE — 1100000001 HC PRIVATE ROOM DAILY

## 2024-09-23 PROCEDURE — 2500000005 HC RX 250 GENERAL PHARMACY W/O HCPCS

## 2024-09-23 PROCEDURE — 99232 SBSQ HOSP IP/OBS MODERATE 35: CPT

## 2024-09-23 PROCEDURE — 81003 URINALYSIS AUTO W/O SCOPE: CPT

## 2024-09-23 PROCEDURE — 70460 CT HEAD/BRAIN W/DYE: CPT

## 2024-09-23 RX ORDER — INSULIN GLARGINE 100 [IU]/ML
20 INJECTION, SOLUTION SUBCUTANEOUS EVERY MORNING
Status: DISCONTINUED | OUTPATIENT
Start: 2024-09-24 | End: 2024-09-24

## 2024-09-23 RX ORDER — POTASSIUM CHLORIDE 1.5 G/1.58G
20 POWDER, FOR SOLUTION ORAL ONCE
Status: COMPLETED | OUTPATIENT
Start: 2024-09-23 | End: 2024-09-23

## 2024-09-23 RX ORDER — INSULIN LISPRO 100 [IU]/ML
0-10 INJECTION, SOLUTION INTRAVENOUS; SUBCUTANEOUS EVERY 4 HOURS
Status: DISCONTINUED | OUTPATIENT
Start: 2024-09-24 | End: 2024-09-24

## 2024-09-23 RX ORDER — HYDROCORTISONE 20 MG/1
20 TABLET ORAL EVERY MORNING
Status: DISCONTINUED | OUTPATIENT
Start: 2024-09-24 | End: 2024-09-24

## 2024-09-23 SDOH — HEALTH STABILITY: MENTAL HEALTH: CURRENT SMOKER: 0

## 2024-09-23 ASSESSMENT — COGNITIVE AND FUNCTIONAL STATUS - GENERAL
DAILY ACTIVITIY SCORE: 24
CLIMB 3 TO 5 STEPS WITH RAILING: A LITTLE
DAILY ACTIVITIY SCORE: 24
MOBILITY SCORE: 22
CLIMB 3 TO 5 STEPS WITH RAILING: A LITTLE
WALKING IN HOSPITAL ROOM: A LITTLE
WALKING IN HOSPITAL ROOM: A LITTLE
MOBILITY SCORE: 22

## 2024-09-23 ASSESSMENT — PAIN SCALES - GENERAL
PAINLEVEL_OUTOF10: 0 - NO PAIN
PAINLEVEL_OUTOF10: 0 - NO PAIN

## 2024-09-23 ASSESSMENT — ENCOUNTER SYMPTOMS
FACIAL ASYMMETRY: 0
RESPIRATORY NEGATIVE: 1
SEIZURES: 0
PSYCHIATRIC NEGATIVE: 1
HEADACHES: 0
NUMBNESS: 0
CARDIOVASCULAR NEGATIVE: 1
CONSTITUTIONAL NEGATIVE: 1
WEAKNESS: 0
DIZZINESS: 0
GASTROINTESTINAL NEGATIVE: 1
SPEECH DIFFICULTY: 0

## 2024-09-23 ASSESSMENT — PAIN - FUNCTIONAL ASSESSMENT: PAIN_FUNCTIONAL_ASSESSMENT: 0-10

## 2024-09-23 NOTE — PROGRESS NOTES
"Jonathan Ayala is a 48 y.o. female on day 6 of admission presenting with Vision changes.    Subjective   Patient was not seen today, with NSVT during rounds.  On the chart was reviewed.    Objective       Last Recorded Vitals  Blood pressure 108/69, pulse 96, temperature 36.3 °C (97.3 °F), temperature source Temporal, resp. rate 18, height 1.575 m (5' 2\"), weight 118 kg (261 lb), SpO2 94%.  Intake/Output last 3 Shifts:  I/O last 3 completed shifts:  In: - (0 mL/kg)   Out: 500 (4.2 mL/kg) [Urine:500 (0.1 mL/kg/hr)]  Weight: 118.4 kg     Relevant Results  Results from last 7 days   Lab Units 09/23/24  1257 09/23/24  0932 09/23/24  0829 09/22/24  1955 09/22/24  1617 09/22/24  1206 09/22/24  0857 09/21/24  0817 09/21/24  0605 09/20/24  0833 09/20/24  0640 09/19/24  1216 09/19/24  0919   POCT GLUCOSE mg/dL 98  --  213* 210* 221*  --  276*   < >  --    < >  --    < >  --    GLUCOSE mg/dL  --  208*  --   --   --  316*  --   --  182*  --  347*  --  401*    < > = values in this interval not displayed.     Scheduled medications  acetaminophen, 650 mg, oral, Once  amitriptyline, 100 mg, oral, Nightly  brimonidine, 1 drop, Both Eyes, BID  clonazePAM, 0.5 mg, oral, BID  divalproex, 500 mg, oral, BID  [Held by provider] enoxaparin, 40 mg, subcutaneous, Daily  ezetimibe, 10 mg, oral, Daily  fluticasone propion-salmeteroL, 2 puff, inhalation, BID  folic acid, 1 mg, oral, Daily  furosemide, 40 mg, oral, BID  [START ON 9/24/2024] hydrocortisone, 20 mg, oral, q AM  [START ON 9/24/2024] insulin glargine, 20 Units, subcutaneous, q AM  insulin lispro, 0-10 Units, subcutaneous, TID  [START ON 9/24/2024] insulin lispro, 0-10 Units, subcutaneous, q4h  insulin lispro, 18 Units, subcutaneous, TID  lacosamide, 250 mg, oral, BID  levETIRAcetam, 1,500 mg, oral, BID  levothyroxine, 75 mcg, oral, Daily before breakfast  montelukast, 10 mg, oral, Nightly  polyethylene glycol, 17 g, oral, Daily  propranolol, 20 mg, oral, TID  prucalopride, 1 tablet, " oral, Daily  rOPINIRole, 0.5 mg, oral, q AM  rOPINIRole, 1 mg, oral, Nightly  sennosides, 1-2 tablet, oral, Nightly      Continuous medications     PRN medications  PRN medications: [DISCONTINUED] acetaminophen **OR** acetaminophen, dextrose, dextrose, dextrose, glucagon, glucagon, glucagon, ipratropium-albuteroL, ondansetron ODT **OR** ondansetron       Results for orders placed or performed during the hospital encounter of 09/17/24 (from the past 24 hour(s))   POCT GLUCOSE   Result Value Ref Range    POCT Glucose 210 (H) 74 - 99 mg/dL   POCT GLUCOSE   Result Value Ref Range    POCT Glucose 213 (H) 74 - 99 mg/dL   CBC and Auto Differential   Result Value Ref Range    WBC 8.6 4.4 - 11.3 x10*3/uL    nRBC 0.0 0.0 - 0.0 /100 WBCs    RBC 4.51 4.00 - 5.20 x10*6/uL    Hemoglobin 12.6 12.0 - 16.0 g/dL    Hematocrit 39.7 36.0 - 46.0 %    MCV 88 80 - 100 fL    MCH 27.9 26.0 - 34.0 pg    MCHC 31.7 (L) 32.0 - 36.0 g/dL    RDW 14.4 11.5 - 14.5 %    Platelets 266 150 - 450 x10*3/uL    Neutrophils % 71.0 40.0 - 80.0 %    Immature Granulocytes %, Automated 0.5 0.0 - 0.9 %    Lymphocytes % 21.6 13.0 - 44.0 %    Monocytes % 5.9 2.0 - 10.0 %    Eosinophils % 0.9 0.0 - 6.0 %    Basophils % 0.1 0.0 - 2.0 %    Neutrophils Absolute 6.12 1.20 - 7.70 x10*3/uL    Immature Granulocytes Absolute, Automated 0.04 0.00 - 0.70 x10*3/uL    Lymphocytes Absolute 1.86 1.20 - 4.80 x10*3/uL    Monocytes Absolute 0.51 0.10 - 1.00 x10*3/uL    Eosinophils Absolute 0.08 0.00 - 0.70 x10*3/uL    Basophils Absolute 0.01 0.00 - 0.10 x10*3/uL   Magnesium   Result Value Ref Range    Magnesium 2.20 1.60 - 2.40 mg/dL   Renal function panel   Result Value Ref Range    Glucose 208 (H) 74 - 99 mg/dL    Sodium 135 (L) 136 - 145 mmol/L    Potassium 3.3 (L) 3.5 - 5.3 mmol/L    Chloride 96 (L) 98 - 107 mmol/L    Bicarbonate 26 21 - 32 mmol/L    Anion Gap 16 10 - 20 mmol/L    Urea Nitrogen 18 6 - 23 mg/dL    Creatinine 0.87 0.50 - 1.05 mg/dL    eGFR 83 >60 mL/min/1.73m*2     Calcium 9.2 8.6 - 10.6 mg/dL    Phosphorus 2.4 (L) 2.5 - 4.9 mg/dL    Albumin 4.1 3.4 - 5.0 g/dL   POCT GLUCOSE   Result Value Ref Range    POCT Glucose 98 74 - 99 mg/dL     *Note: Due to a large number of results and/or encounters for the requested time period, some results have not been displayed. A complete set of results can be found in Results Review.      Assessment/Plan   Assessment & Plan  Vision changes  Jonathan Ayala is a 48 y.o. 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 Endocrine is consulted on 9/20/24  for management of Inpatient Hyperglycemia.     Hospital course (9/17 - 9/20)   3 Doses of Methylprednisone 1 g given at 4 pm daily -Last dose - 9/18   Plan:   HC 10 mg orally daily - home dose pt.   HC of 10 mg orally daily - resumed during patient's stay.    Diabetes Home Regimen:   Toujeo  50 units AM   Carb Ratio - 1:2 TID (Undercarb estimating however)  Sliding Scale - >200 - 1 unit for every 30 >150/ Sliding Scale of 2 units for every  30 >150 at bedtime.     Diabetes diagnosis:   2002 -2003    Family Hx: DM 2 - Father and Paternal Cousins    Diabetes Complications:   Gastroparesis/ Peripheral Neuropathy   Denies Hypoglycemic and Hyperglycemic events requiring 3rd party assistance    Home Dietary Snapshot:   Attempts to keep a well balanced diet  Avoids Aspartame in the light of Gastroparesis. However it appears that patient is UnderCarb Estimating.     CGM: Dexcom G7     Previously on Tandem - short period  of time- reportedly hypoglycemia.   With Omnipod recently - adhesive rxn.     Previous treatment:   Ozempic with GI side effects on the 2 mg weekly dose - though had been endorsing weight loss was discontinued.  Metformin with GI side effects.    Patient follows with Dr. Felix and team. Last seen on 9/16 -Fetzner.  Upcoming appointment on 10/10/23 - [facility].     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 Endocrine was consulted on 9/20/24  for management of Inpatient Hyperglycemia.       Diabetes history:  -Diagnosed 2002 -2003  -Follows with Dr. Felix and her group OP (Last seen on 9/16 -Fetzner.Upcoming appointment on 10/10/23 - Fetzner)  -Diabetes Home Regimen:   Toujeo  50 units AM   Carb Ratio - 1:2 TID (Undercarb estimating)  Sliding Scale -  1 unit for every 30 > 200 and  2 units for every  30 >200 at bedtime.   -Diabetes Complications:   Gastroparesis/ Peripheral Neuropathy   -Monitors blood sugar via Dexcom G7   -Previously on Tandem - short period of time- reportedly hypoglycemia.   With Omnipod recently - adhesive rxn.   -Previous treatment:   Ozempic and Metformin  (Had GI side effects)    Glucocorticoid during hospital stay:  -Patient received 3 doses of 1 g of methylprednisolone.  Last dose was on 9/18  Which resulted in significant hyperglycemia.  -Dexamethasone 4 mg on 9/21 at bedtime  -Patient back on home hydrocortisone dose 10 mg daily [per orders patient is supposed to be on 10 twice daily, only taking 10 mg in the morning].    Of note, she is planned for  shunt placement on 9/24 for elevated ICP, this will be n.p.o.    Type 2 diabetes: Poorly controlled in setting of acute illness, glucocorticoid use, and stress  Recommendations:  -Administer hydrocortisone 20 mg p.o. on 9/20 4 AM prior to arrival, followed by hydrocortisone 50 mg every 6 hours afterwards as patient needs to be stress dosed prior to anesthesia and OR  - Administer glargine only 20 units tomorrow morning 9/20 4 AM  - Hold lispro scheduled 18 units 3 times daily with meals when patient is n.p.o.  - Change lispro #2 insulin sliding scale (2 units for every 50 above 150) to every 4 hours when patient is n.p.o.  -Accu-Cheks every 4 hours when patient is n.p.o.  -Hypoglycemia protocol  -Carb consistent diet 60    Plan was communicated to the primary team via secure messaging    Marcela Vazquez MD  Endocrinology, PGY 5  Pager [phone] or secure chat     Case discussed with Dr. Conklin

## 2024-09-23 NOTE — CONSULTS
Inpatient consult to Medicine  Consult performed by: Neo Cardoso MD  Consult ordered by: Ciara Elder MD        Reason For Consult  Pre-op clearance for  shunt     History Of Present Illness  Jonathan Ayala is a 48 y.o. 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 Ophthalmology exam showed new grade 4 right optic disc edema. Extensive workup thus far includes: MRI brain/orbits and MRV without enhancement of optic nerve, demyelinating lesions or VST, CXR with no evidence of hilar adenopathy/infiltrate, CT chest negative for sarcoidosis or intrathoracic pathology,negative toxoplasma IgG, Syphilis Ab reactive, RPR nonreactive (most likely Ab from IVIG), and LP with elevated opening pressure to 52 mmHg, elevated protein, with borderline WBC of 6 (no cell type predominant). CNS demyelinating panel and further LP results pending. Patient s/p IV solumedrol x 3 days (last dose 9/19). Neurosurgery planning for VPS 9/24 pending pulm/medicine clearance.     Today, patient is doing well. States her vision in her right eye has mildly improved since admission. Only has peripheral vision in left eye at baseline from prior subarachnoid hemorrhage. Denies headache, dizziness, weakness, numbness, loss of consciousness, chest pain, palpitations, SOB, wheezing. Does endorse recent history of falls without head trauma and remote history of ear fullness/ringing.    Past Medical History  She has a past medical history of Abnormal findings on diagnostic imaging of other abdominal regions, including retroperitoneum (10/14/2020), Acquired deformity of nose (03/24/2022), Acute upper respiratory infection, unspecified (10/16/2019), Allergy  status to unspecified drugs, medicaments and biological substances (05/22/2020), Allergy status to unspecified drugs, medicaments and biological substances (11/13/2020), Benign intracranial hypertension (01/27/2022), Bipolar disorder, unspecified (Multi), Breast calcification, right (08/21/2018), Cellulitis of abdominal wall (09/28/2022), Cervicalgia (07/01/2020), Chronic maxillary sinusitis (01/04/2022), Chronic sialoadenitis (03/16/2020), COVID-19 (01/06/2022), Decreased white blood cell count, unspecified (11/04/2019), Disease of thyroid gland, Disturbances of salivary secretion (03/16/2020), Dry eye syndrome of bilateral lacrimal glands (10/07/2022), Encounter for preprocedural cardiovascular examination (02/01/2022), Food additives allergy status (06/11/2020), Fracture of nasal bones, initial encounter for closed fracture (03/03/2022), Granuloma of right orbit (10/07/2021), History of endometrial ablation (11/09/2017), Hyperlipidemia, Hypertension, Hyperthyroidism, Hypoglycemia, Hypothyroidism, Localized swelling, mass and lump, head (03/24/2022), Major depressive disorder, recurrent, in full remission (CMS-[facility]) (10/07/2021), 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), Migraine, Nipple discharge (08/24/2022), Ocular pain, right eye (10/07/2022), Optic atrophy, Other abnormal and inconclusive findings on diagnostic imaging of breast (07/06/2020), Other anomalies of pupillary function (05/31/2019), Other chest pain (05/18/2020), Other conditions influencing health status (08/01/2022), Other conditions influencing health status (08/03/2021), Other specified disorders of eustachian tube, left ear (11/18/2019), Other specified disorders of nose and nasal sinuses (03/24/2022), Other specified disorders of nose and nasal sinuses (03/24/2022), Pelvic and perineal pain (07/06/2020), Personal history of other diseases of the circulatory system (04/07/2020), Personal history of other diseases of the circulatory  system (04/07/2020), Personal history of other diseases of the circulatory system, Personal history of other diseases of the digestive system, Personal history of other diseases of the digestive system (03/02/2020), Personal history of other diseases of the musculoskeletal system and connective tissue (01/19/2022), Personal history of other diseases of the musculoskeletal system and connective tissue (03/02/2021), Personal history of other diseases of the musculoskeletal system and connective tissue (06/16/2020), Personal history of other diseases of the nervous system and sense organs (11/18/2019), Personal history of other diseases of the nervous system and sense organs (09/21/2022), Personal history of other diseases of the respiratory system (04/14/2021), Personal history of other endocrine, nutritional and metabolic disease (02/17/2021), Personal history of other mental and behavioral disorders (05/27/2021), Personal history of other specified conditions (09/07/2022), Personal history of other specified conditions (10/16/2019), Personal history of other specified conditions (09/28/2022), Personal history of other specified conditions (09/16/2021), Personal history of other specified conditions (02/01/2022), Personal history of other specified conditions (03/09/2022), Personal history of other specified conditions (02/12/2014), Personal history of other specified conditions (10/27/2021), Personal history of other specified conditions (10/16/2019), Personal history of other specified conditions (02/26/2021), Personal history of other specified conditions (02/22/2021), Personal history of urinary calculi, Polycystic ovary syndrome, Postural orthostatic tachycardia syndrome (POTS), Rash and other nonspecific skin eruption (03/15/2022), Repeated falls (06/23/2021), Right lower quadrant pain (10/14/2020), Sjogren syndrome, unspecified (Multi), Sjogren's syndrome (Multi), Slow transit constipation (07/09/2020),  Subarachnoid hemorrhage, traumatic (Multi) (04/19/2023), Thyroid nodule, Traumatic subarachnoid hemorrhage with loss of consciousness of unspecified duration, subsequent encounter (03/15/2022), Traumatic subarachnoid hemorrhage without loss of consciousness, subsequent encounter, Type 2 diabetes mellitus (Multi), Unspecified disorder of refraction (10/07/2022), Unspecified optic neuritis (11/06/2020), Unspecified optic neuritis (11/06/2020), Unspecified visual loss (09/25/2019), Venous insufficiency (chronic) (peripheral) (10/18/2021), Viral infection, unspecified (01/11/2022), Vitamin D deficiency, and Vitamin D deficiency, unspecified (09/28/2022).    Surgical History  She has a past surgical history that includes Other surgical history (08/22/2019); Other surgical history (08/22/2019); Other surgical history (08/22/2019); Other surgical history (08/22/2019); Other surgical history (08/22/2019); Other surgical history (08/22/2019); MR angio neck wo IV contrast (02/08/2021); MR angio head wo IV contrast (02/08/2021); [place] tooth extraction (2004); Appendectomy (2017); Hysterectomy (2017); and Hernia repair (Right, 03/01/2024).     Social History  She reports that she has never smoked. She has never used smokeless tobacco. She reports that she does not currently use alcohol. She reports current drug use. Drug: Benzodiazepines.    Family History  Family History   Problem Relation Name Age of Onset    Other (Perforated bowel) Mother Radha     Hypertension Mother Radha     Macular degeneration Mother Radha     Hyperlipidemia Father Mac     Hypertension Father Mac     Heart attack Father Mac     Other (S/P CABG) Father Mac     Diabetes Father Mac     Prostate cancer Father Mac     Coronary artery disease Father Mac     Heart failure Father Mac     Stroke Father Mac     Cancer Father Mac     Macular degeneration Father Mac     Retinal detachment Father Mac     Stroke Sister Jazmin     Breast  cancer Sister Jazmin     Lupus Sister Jazmin     Ovarian cancer Sister Jazmin     Astigmatism Sister Jazmin     Hyperlipidemia Brother Sanchez     Hypertension Brother Sanchez     Astigmatism Brother Sanchez     Diabetes Father's Sister Linda     Blindness Father's Sister Linda     Thyroid cancer Father's Sister Bekah     Thyroid disease Father's Sister Jessica     Colon cancer Father's Brother ?     Blindness Other Multiple     Melanoma Other      Asthma Other      Other (hay fever) Other      Allergies Other      Cancer Maternal Grandmother Brenda     Cancer Father's Brother Kian         Allergies  Acetazolamide, Atorvastatin, Cefdinir, Ceftriaxone, Doxepin, Duloxetine, Levofloxacin, Levofloxacin in d5w, Nutritional supplements, Ozempic [semaglutide], Prochlorperazine, Red dye, Rosemary, Rosemary oil, Strawberry, Sulfa (sulfonamide antibiotics), Topiramate, Tree pollen-black walnut, Tree pollen-pecan, [place], Aripiprazole, Aspartame, Aspartame (bulk), Fenofibrate, Gluten, Hydrochlorothiazide, Hydromorphone, Iron, Meclizine, Metformin, Propoxyphene, Statins-hmg-coa reductase inhibitors, Tetracyclines, Thiazides, Venlafaxine, Wheat, Adhesive tape-silicones, Barbiturates, Ciprofloxacin, Dhe, Diet foods, Farxiga [dapagliflozin], Ferrous sulfate, Nsaids (non-steroidal anti-inflammatory drug), Other, Sulfonylureas, Tobramycin, Vancomycin, Adhesive, Azithromycin, Betamethasone, House dust, Metoclopramide hcl, Prednisone, Propoxyphene-acetaminophen, Sulfacetamide sodium, Amzrexku-9-ou5 antimigraine agents, and Zolpidem    Review of Systems   Constitutional: Negative.    HENT:  Positive for tinnitus.    Eyes:  Positive for visual disturbance.   Respiratory: Negative.     Cardiovascular: Negative.    Gastrointestinal: Negative.    Neurological:  Negative for dizziness, seizures, facial asymmetry, speech difficulty, weakness, numbness and headaches.   Psychiatric/Behavioral: Negative.          Physical Exam  Constitutional:       Appearance:  Normal appearance. She is obese.   Eyes:      Extraocular Movements: Extraocular movements intact.      Conjunctiva/sclera: Conjunctivae normal.      Comments: Diminished vision in right eye. Unable to correctly identify number of fingers being held up   Cardiovascular:      Rate and Rhythm: Normal rate and regular rhythm.      Pulses: Normal pulses.      Heart sounds: Normal heart sounds.   Pulmonary:      Effort: Pulmonary effort is normal.      Breath sounds: Normal breath sounds.   Abdominal:      General: Abdomen is flat.      Palpations: Abdomen is soft.   Musculoskeletal:      Right lower leg: No edema.      Left lower leg: No edema.   Skin:     General: Skin is warm and dry.   Neurological:      General: No focal deficit present.      Mental Status: She is alert and oriented to person, place, and time.      Sensory: No sensory deficit.      Motor: No weakness.   Psychiatric:         Mood and Affect: Mood normal.         Behavior: Behavior normal.          Last Recorded Vitals  /81   Pulse 101   Temp 35.9 °C (96.6 °F)   Resp 18   Wt 118 kg (261 lb)   SpO2 95%     Relevant Results  MRI Brain 9/21/24:  IMPRESSION:  1. No evidence of acute infarct, intracranial mass effect or midline  shift.  2. Stable encephalomalacia/gliosis within the right frontal lobe  convexity, likely from prior infarct.    9/20 CT chest high resolution:  IMPRESSION:  1.  No CT findings suggestive of intrathoracic sarcoidosis. No CT  evidence of acute process in the chest.  2. Minimal/mild air trapping in the expiratory phase images which may  be due to component of airway disease.     Assessment/Plan     Jonathan Ayala is a 48 y.o. 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 Ophthalmology exam showed new grade 4 right optic disc edema. Workup for optic neuritis vs IIH. Plan for VPS on 9/24. Consulted for pre-op evaluation.    Plan:  -RCRI: Class III Risk (1 point for pre-operative insulin treatment, 1 point for history of stroke ). 10.1% 30-day risk of death, MI, or cardiac arrest.   -DASI: 7.99 METS  -Normal stress test March 2023. EF >65%. Normal LV size and function. No coronary artery calcifications on high resolution CT from 9/20. Recommend obtaining EKG.  -Already seen by pulmonology for clearance given history of asthma and BRENT.  -ARISCAT score 31 -> 13% risk of post-operative pulmonary complications   -Refer to note for recommendations   -Patient reported history of difficult intubation requiring video laryngoscopy.       Neo Cardoso MD  PGY-1

## 2024-09-23 NOTE — PROGRESS NOTES
Spiritual Care Visit    Clinical Encounter Type  Visited With: Patient  Routine Visit: Introduction  Continue Visiting: Yes  Surgical Visit: Pre-op  Referral From: Patient  Referral To:     Yazidism Encounters  Yazidism Needs: Prayer    Values/Beliefs  Cultural Requests During Hospitalization: none noted  Spiritual Requests During Hospitalization: prayer, support, comfort    Sacramental Encounters  Communion: Ask the patient  Communion Given Indicator: No    Patient Spiritual Care Encounters  Suffering Severity: Moderate  Fear Level: Moderate  Feelings of Loneliness: Fair  Feelings of Hopelessness: Fair  Coping: Consistently demonstrated    Family Spiritual Care Encounters  Family Participation in Care: Consistently demonstrated  Family Support During Treatment: Consistently demonstrated  Caregiver-Patient Relationship: Not compromised         PC-7 Assessment (Level of Unmet Needs)  Existential Struggle: Further assess  Spiritual/Yazidism Struggle: Further assess  Legacy: Further assess  Relationships: Further assess  Fear of Death/Dying: Further assess  Values/Medical Decision Making: Further assess  Ritual/Other: Further assess  PC-7 Score: 0    SDAT (Spiritual Distress Assessment Tool)  Need for Life Balance: Some evidence of unmet spiritual need  Need for Connection: No evidence of unmet spiritual need  Need for Values Acknowledgement: No evidence of unmet spiritual need  Need to Maintain Control: No evidence of unmet spiritual need  Need to Maintain Identity: No evidence of unmet spiritual need  SDAT Score: 1  SDAT Average Score: 0.2    Taxonomy  Intended Effects: Convey a calming presence, Demonstrate caring and concern, Lessen anxiety  Methods: Explore presence of God, Exploring hope, Offer spiritual/Restorationist support, Offer support  Interventions: Active listening, Discuss concerns, Prayer for healing    Initial spiritual care visit with patient. Patient asked to see  in order to receive a  "prayer and blessing prior to surgery tomorrow. Patient will be having a shunt placed. Patient shared that she is \"in good spirits\" and does not have \"any real fears\". Patient has good family support. She talked about her grandchildren, her , and her dog! Patient asked for prayer, which this  lifted up for her. Patient asked  if she is a healer because \"when you prayed for me and held my hands, I felt something calming and healing.\"  was very happy to hear that patient felt comforted.  will continue to follow patient for support.    "

## 2024-09-23 NOTE — PROGRESS NOTES
"Jonathan Ayala is a 48 y.o. female on day 6 of admission presenting with Vision changes.    Subjective   Jonathan Ayala is a 48 y.o. 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      Interval Updates:  No acute events overnight    Morning encounter:   Vision is back to where it was before earlier this admission. Still blurry, can see movement.      Objective     Last Recorded Vitals  Blood pressure 105/71, pulse 87, temperature 35.9 °C (96.6 °F), resp. rate 20, height 1.575 m (5' 2\"), weight 118 kg (261 lb), SpO2 94%.    Physical Exam  HENT:      Head: Normocephalic.   Eyes:      General: Lids are normal.      Extraocular Movements: Extraocular movements intact.   Cardiovascular:      Pulses: Normal pulses.      Heart sounds: Normal heart sounds.   Pulmonary:      Effort: Pulmonary effort is normal.      Breath sounds: Normal breath sounds.   Neurological:      Mental Status: She is alert.      Motor: Motor strength is normal.     Deep Tendon Reflexes:      Reflex Scores:       Bicep reflexes are 2+ on the right side and 2+ on the left side.       Brachioradialis reflexes are 2+ on the right side and 2+ on the left side.       Patellar reflexes are 1+ on the right side and 1+ on the left side.       Achilles reflexes are 1+ on the right side and 1+ on the left side.  Psychiatric:         Speech: Speech normal.     Neurological Exam  Mental Status  Alert. Oriented to person, place and time. Recent and remote memory are intact. Speech is normal. Attention and concentration are normal.    Cranial Nerves  CN II: Right visual acuity: Finger movement. " Left visual acuity: Finger movement. Right homonymous hemianopsia. Left normal visual field.  CN III, IV, VI: Extraocular movements intact bilaterally. Normal lids and orbits bilaterally.   Right pupil: 3 mm.   Left pupil: 3 mm. Pain endorsed with rightward gaze in right eye..  CN V: Facial sensation is normal.  CN VII: Full and symmetric facial movement.  CN VIII: Hearing is normal.  CN IX, X: Palate elevates symmetrically  CN XI: Shoulder shrug strength is normal.  CN XII: Tongue midline without atrophy or fasciculations.  Right eye with blurry vision in the nasal inferior. Left eye with baseline decreased peripheral visual acuity.    Motor  Normal muscle bulk throughout. No fasciculations present. Normal muscle tone. Strength is 5/5 throughout all four extremities.    Sensory  Pinprick is normal in upper and lower extremities. Diminished cold sensation in distal lower extremities.. Vibration abnormality: Decreased vibration in distal lower extremities -- upon re-examination, some vibration intact in upper body but diminished in feet and toes. . Proprioception is normal in upper and lower extremities.     Reflexes                                            Right                      Left  Brachioradialis                    2+                         2+  Biceps                                 2+                         2+  Patellar                                1+                         1+  Achilles                                1+                         1+    Coordination  Right: Finger-to-nose normal. Rapid alternating movement normal. Heel-to-shin normal.    Gait    Deferred.    Results from last 72 hours   Lab Units 09/22/24  1206 09/21/24  0605   WBC AUTO x10*3/uL 8.1 8.9   NRBC AUTO /100 WBCs 0.0 0.0   RBC AUTO x10*6/uL 4.54 4.22   HEMOGLOBIN g/dL 12.7 12.0   HEMATOCRIT % 38.8 38.8   MCV fL 86 92   MCH pg 28.0 28.4   MCHC g/dL 32.7 30.9*   RDW % 14.5 14.6*   PLATELETS AUTO x10*3/uL 259 202   NEUTROS PCT  AUTO % 80.7 73.6   IG PCT AUTO % 0.2 0.4   LYMPHS PCT AUTO % 12.2 16.9   MONOS PCT AUTO % 6.8 8.9   EOS PCT AUTO % 0.0 0.1   BASOS PCT AUTO % 0.1 0.1   NEUTROS ABS x10*3/uL 6.56 6.57   IG AUTO x10*3/uL 0.02 0.04   LYMPHS ABS AUTO x10*3/uL 0.99* 1.51   MONOS ABS AUTO x10*3/uL 0.55 0.80   EOS ABS AUTO x10*3/uL 0.00 0.01   BASOS ABS AUTO x10*3/uL 0.01 0.01      Results from last 72 hours   Lab Units 09/22/24  1206 09/21/24  0605   GLUCOSE mg/dL 316* 182*   SODIUM mmol/L 135* 138   POTASSIUM mmol/L 3.8 3.7   CHLORIDE mmol/L 96* 98   CO2 mmol/L 27 31   ANION GAP mmol/L 16 13   BUN mg/dL 18 23   CREATININE mg/dL 0.92 1.00   EGFR mL/min/1.73m*2 78 71   CALCIUM mg/dL 9.3 8.4*   PHOSPHORUS mg/dL 3.2 2.8   ALBUMIN g/dL 3.9 3.7   MAGNESIUM mg/dL 2.09 2.02      Results from last 72 hours   Lab Units 09/22/24  1206 09/21/24  0605   ALBUMIN g/dL 3.9 3.7             MRI Brain w and wo IV contrast, MRI Orbit w and wo IV contrast, MRV w and wo IV contrast 9/17  There is similar mild brain parenchymal volume loss when compared with 06/05/2024.      An area of encephalomalacia is again noted within the right frontal lobe convexity. The gradient echo T2 images again demonstrate a small amount of curvilinear blooming dark signal along the margins of the  area of encephalomalacia suggesting associated hemosiderin deposition anterior dystrophic calcification/mineralization.      The high-resolution MRI images of the orbits again demonstrate asymmetric atrophy and increased STIR signal within the left optic nerve when compared with the right similar when compared with the  prior MRI of the orbits dated 06/05/2024. Again, no corresponding abnormal enhancement is identified.      The intracranial MRV is within normal limits without evidence of venous sinus thrombosis.    Scheduled medications  acetaminophen, 650 mg, oral, Once  amitriptyline, 100 mg, oral, Nightly  brimonidine, 1 drop, Both Eyes, BID  clonazePAM, 0.5 mg, oral,  BID  divalproex, 500 mg, oral, BID  enoxaparin, 40 mg, subcutaneous, Daily  ezetimibe, 10 mg, oral, Daily  fluticasone propion-salmeteroL, 2 puff, inhalation, BID  folic acid, 1 mg, oral, Daily  furosemide, 40 mg, oral, BID  hydrocortisone, 10 mg, oral, q AM  insulin glargine, 50 Units, subcutaneous, q AM  insulin lispro, 0-10 Units, subcutaneous, TID  insulin lispro, 18 Units, subcutaneous, TID  lacosamide, 250 mg, oral, BID  levETIRAcetam, 1,500 mg, oral, BID  levothyroxine, 75 mcg, oral, Daily before breakfast  montelukast, 10 mg, oral, Nightly  polyethylene glycol, 17 g, oral, Daily  propranolol, 20 mg, oral, TID  rOPINIRole, 0.5 mg, oral, q AM  rOPINIRole, 1 mg, oral, Nightly  sennosides, 1-2 tablet, oral, Nightly      Continuous medications     PRN medications  PRN medications: [DISCONTINUED] acetaminophen **OR** acetaminophen, dextrose, dextrose, dextrose, glucagon, glucagon, glucagon, ipratropium-albuteroL, ondansetron ODT **OR** ondansetron      Assessment/Plan      Assessment & Plan  IIH (idiopathic intracranial hypertension)    Jonathan Ayala is a 48 y.o. 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 . In terms of etiology for unilateral optic disc edema, atypical optic neuritis remains on the differential vs neuro-retinitis.  Lumbar puncture mostly noticeable for elevated protein and opening pressure of 52, but no pleocytosis with predominant cell type. Headache was greatly improved after LP and vision continues to improve. This makes the differential of IIH more enticing. She has a history of allergy to diamox which she has refused densitization for. After discussion with  patient, she understands the risks of  shunt iso CVID diagnosis and would like to proceed with this procedure.    Updates 9/23:  - CTH Volumetric today for plan for  shunt tomorrow    #R optic disc edema   #c/f Optic neuritis vs IIH  ::Follows with Dr Mederos outpatient  ::9/17 Serum toxoplasma IgG noreactive  ::9/18 Syphilis Ab reactive, RPR nonreactive (most likely Ab from IVIG)  ::9/18 LP opening pressure 52, WBC 6 (36% PMNs, 29% lymphocytes), 1000 RBC (Tube 1), protein 79, glucose 154 (POCT glucose >300), closing pressure 22, meningitis panel negative, HSV negative, VDRL nonreactive, cytology without malignant cells, flow cytometry without discrete population of B cells, negative for oligoclonal banding, elevated IgG  :: s/p IV Solumedrol 1g daily for three days (last dose 9/19 6AM)  :: CSF studies all negative: West Nile, VZV, Toxoplasma, Meningitis panel, VDRL, HSV, Fungal culture  :: CSF with WBC 6, RBC 1000, protein 79, glucose 154, IgG 13.7, OCB negative   - CT chest w IV con negative for sarcoidosis or intrathoracic pathology  - OR with NSGY on 9/24 for VPS  - Allergy: deferring diamox desensitization in favor of VPS, patient agrees and understands risks and benefits     #IIH s/o R frontal bolt 2022 c/b focal epilepsy   #R hemiplegic migraines   - c/w divalproex 500 mg BID   - c/w lasix 40mg BID, if hypotensive polus prn  - c/w Lacosamide 250 mg BID   - c/w Keppra 1500 mg BID   - strict I&Os for volume status    #Hypothyroidism   - c/w Levothyroxine 75 mcg daily      #Asthma   - c/w motelukast 10 mg daily   - c/w duonebs PRN   - c/w Breo Ellipta daily      #CVID   #Sjorgen's syndrome   - IVIG infusions every 14 days?, last infusion 09/12     #POTS  - propranolol 20mg TID    #HLD   - c/w Zetia 10 mg daily      #T2DM   - lantus 50 qAM, lispro 7 TID  - Endocrine consulted, appreciate recs  - Glucose checks AC HS   - hypoglycemia protocol      MISC: c/w ropinirole BID (0.5mg qAM, 1mg qPM)      F: PRN  E:  PRN  N: Carb controlled  A: PIV      O2: Room air   Bowel Regimen: Miralax, Sennokot nightly, Motegrity  DVT ppx: Lovenox     Code Status: FULL CODE     Giacomo Olguin MD  Department of Neurology, PGY-2  [facility] [id number]

## 2024-09-23 NOTE — ANESTHESIA PREPROCEDURE EVALUATION
Patient: Jonathan Ayala    Procedure Information       Date/Time: 09/24/24 0426    Procedure: Right Ventricular Peritoneal shunt placement (Right)    Location: [facility] OR 26 / [facility] OR    Surgeons: Avelino Tafoya MD            Relevant Problems   Anesthesia  Pt states she was told that she is a difficult intubation and more recent  intubations were performed with videolaryngoscopy w/o difficulty   (+) Difficult intubation      Cardiac   (+) Benign essential hypertension   (+) Dyslipidemia, goal LDL below 100      Pulmonary  Pt. States that due to her CVID she requires prophylactic antibiotics to prevemt pneumonia after procedures requiring intubation   (+) Moderate persistent allergic asthma (HHS-HCC)   (+) BRENT on CPAP      Neuro   (+) IIH (idiopathic intracranial hypertension)   (+) Lumbar radiculopathy   (+) Major depressive disorder, recurrent, in full remission with anxious distress (CMS-HCC)   (+) Seizure disorder (Multi)      GI   (+) Irritable bowel syndrome with diarrhea      /Renal (within normal limits)      Liver (within normal limits)      Endocrine   (+) Class 3 severe obesity due to excess calories with serious comorbidity and body mass index (BMI) of 40.0 to 44.9 in adult   (+) Diabetic gastroparesis associated with type 2 diabetes mellitus (Multi)   (+) Hypothyroidism   (+) Type 2 diabetes mellitus with hyperglycemia, with long-term current use of insulin      Hematology (within normal limits)      ID   (+) Vaginal moniliasis      Skin  scleraderma       Clinical information reviewed:    Allergies  Meds             Multiple allergies  NPO Detail: Pt. Advised to stay NPO after midnight  No data recorded     Physical Exam    Airway  Mallampati: IV  TM distance: >3 FB  Neck ROM: full  Comments: Easy glidescope intubation per chart.   Cardiovascular    Dental - normal exam     Pulmonary    Abdominal            Anesthesia Plan    History of general anesthesia?: yes  History of  complications of general anesthesia?: yes    ASA 3     general     The patient is not a current smoker.  Patient did not smoke on day of procedure.    intravenous induction   Postoperative administration of opioids is intended.  Trial extubation is planned.  Anesthetic plan and risks discussed with patient.  Use of blood products discussed with patient who consented to blood products.    Plan discussed with CRNA.     GA/ETT w/ videolaryngoscopy (due to previous difficult intubation), difficult IV access, has a Powerport in chest. Pt. Has CVID and states that she has had pneumonia after intubations in the past and requires prophylactic antibiotics to prevent postsurgical pneumonia    Plan general endotracheal anesthesia with peripheral IV placement and ASA standard noninvasive monitors.  The possibility of blood product transfusion was also described in detail.  Risks, benefits, alternatives of this plan were described in detail to the patient, who indicated understanding and agreed to proceed.    Forest Dodd MD

## 2024-09-23 NOTE — ASSESSMENT & PLAN NOTE
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 Endocrine was consulted on 9/20/24  for management of Inpatient Hyperglycemia.       Diabetes history:  -Diagnosed 2002 -2003  -Follows with Dr. Felix and her group OP (Last seen on 9/16 -Stefany.Upcoming appointment on 10/10/23 - Stefany)  -Diabetes Home Regimen:   Toujeo  50 units AM   Carb Ratio - 1:2 TID (Undercarb estimating)  Sliding Scale -  1 unit for every 30 > 200 and  2 units for every  30 >200 at bedtime.   -Diabetes Complications:   Gastroparesis/ Peripheral Neuropathy   -Monitors blood sugar via Dexcom G7   -Previously on Tandem - short period of time- reportedly hypoglycemia.   With Omnipod recently - adhesive rxn.   -Previous treatment:   Ozempic and Metformin (Had GI side effects)    Glucocorticoid during hospital stay:  -Patient received 3 doses of 1 g of methylprednisolone.  Last dose was on 9/18  Which resulted in significant hyperglycemia.  -Dexamethasone 4 mg on 9/21 at bedtime  -Patient back on home hydrocortisone dose 10 mg daily [per orders patient is supposed to be on 10 twice daily, only taking 10 mg in the morning].    Of note, she is planned for  shunt placement on 9/24 for elevated ICP, this will be n.p.o.    Type 2 diabetes: Poorly controlled in setting of acute illness, glucocorticoid use, and stress  Recommendations:  -Administer hydrocortisone 20 mg p.o. on 9/20 4 AM prior to arrival, followed by hydrocortisone 50 mg every 6 hours afterwards as  patient needs to be stress dosed prior to anesthesia and OR  - Administer glargine only 20 units tomorrow morning 9/20 4 AM  - Hold lispro scheduled 18 units 3 times daily with meals when patient is n.p.o.  - Change lispro #2 insulin sliding scale (2 units for every 50 above 150) to every 4 hours when patient is n.p.o.  -Accu-Cheks every 4 hours when patient is n.p.o.  -Hypoglycemia protocol  -Carb consistent diet 60    Plan was communicated to the primary team via secure messaging    Marcela Vazquez MD  Endocrinology, PGY 5  Pager [phone] or secure chat     Case discussed with Dr. Conklin

## 2024-09-23 NOTE — PROGRESS NOTES
"Jonathan Ayala is a 48 y.o. female on day 6 of admission presenting with Vision changes.    Subjective   No complaints    Objective     Physical Exam  AOx3  R 5/5  LUE 5/5  LLE 5/5    Last Recorded Vitals  Blood pressure 105/71, pulse 87, temperature 35.9 °C (96.6 °F), resp. rate 20, height 1.575 m (5' 2\"), weight 118 kg (261 lb), SpO2 94%.  Intake/Output last 3 Shifts:  I/O last 3 completed shifts:  In: 150 (1.3 mL/kg) [P.O.:150]  Out: 800 (6.8 mL/kg) [Urine:800 (0.2 mL/kg/hr)]  Weight: 118.4 kg     Relevant Results    Assessment/Plan   Principal Problem:    Vision changes  Active Problems:    Type 2 diabetes mellitus with hyperglycemia, with long-term current use of insulin (Multi)    IIH (idiopathic intracranial hypertension)    48yF h/o IIH (dx 2/2022 w/ OP 25) s/p RF bolt (Lottie) c/b tract hemorrhage, SAH, and focal epilepsy, R hemiplegic migraines, optic neuropathy, CVID (IVIG qmonthly), Sjogren's syndrome, Scleroderma, fibromyalgia, chronic pain syndrome, POTS, T2DM, HTN, hypothyroidism, BRENT on CPAP, morbid obesity, GERD, gastroparesis s/p pyeloplasty, NAFLD, anxiety, depression, 9/17 p/w 1 month of R eye blurry vision, and intermittent pulsatile tinnitus, MRI brain RF encephalomalacia, MR orbit STIR signal within L optic nerve, MRV negative, 9/18 s/p LP (OP 52), 9/21 s/p BAT for L hemiplegia, CTH/CTA stable RF encephalomalacia, no LVO, MRI neg    Recommendations  Neurology primary  OR Tues 9/24 for R VPS   Please obtain medicine consult for preop clearance  Appreciate pulmonary recommendations regarding operative clearance/further pulmonary optimization   Will need volumetric CT head non-contrast for surgical planning   Eder Melissa MD      "

## 2024-09-23 NOTE — PROGRESS NOTES
"Jonathan Ayala is a 48 y.o. female on day 6 of admission presenting with Vision changes.      Subjective   Today she feels her vision is stable. Her right eye discomfort has since resolved. Still with some difficulty seeing out of the lower portion of the right eye since she got here. Planning for VPS tomorrow with neurosurgery.        Objective     Last Recorded Vitals  Blood pressure 119/81, pulse 101, temperature 35.9 °C (96.6 °F), resp. rate 18, height 1.575 m (5' 2\"), weight 118 kg (261 lb), SpO2 95%.    Physical Exam  Base Eye Exam       Visual Acuity (Snellen - Linear)         Right Left    Near sc 20/20 20/200 ph 20/70+1              Pupils         Dark Light Shape React APD    Right 6 4 Round Brisk None    Left 6 4 Round Brisk +rAPD              Visual Fields         Left Right                     Left to hand motion             Extraocular Movement         Right Left     Full, Ortho Full, Ortho              Neuro/Psych       Oriented x3: Yes    Mood/Affect: Normal              Dilation       Both eyes: 1% Mydriacyl & 2.5% Cuco  @ 10:35 AM                  Additional Tests       Color         Right Left    Ishihara 14/14 0/14                  Slit Lamp and Fundus Exam       External Exam         Right Left    External Normal Normal              Slit Lamp Exam         Right Left    Lids/Lashes Normal Normal    Conjunctiva/Sclera White and quiet White and quiet    Cornea Clear Clear    Anterior Chamber Deep and quiet Deep and quiet    Iris Round and reactive Round and reactive    Lens Clear Clear    Anterior Vitreous Normal Normal              Fundus Exam         Right Left    Disc edema grade 2-3 with pallor Pallor    C/D Ratio obscured 0.15    Macula Normal Normal    Vessels Normal Normal    Periphery Normal Normal                        Assessment/Plan   Assessment & Plan  Vision changes    IIH (idiopathic intracranial hypertension)    Type 2 diabetes mellitus with hyperglycemia, with long-term current use " of insulin    This 48 year-old woman with a history of left non-arteritic anterior ischemic optic neuropathy,  bilateral sequential vision loss with right eye improvement 11/13/2019, idiopathic intracranial hypertension, seizures, scleroderma, Sjogren, CVID on monthly IVIG, POTS, HTN, DM2, hypothyroidism, RBENT on CPAP, migraine who presents with right eye visual loss.     Seen in clinic with Dr. Mederos on 9/16/24 with new grade 4 right optic disc edema. Differential diagnosis includes sequential non-arteritic anterior ischemic optic neuropathy, but also optic neuritis, both typical and atypical forms, as well as intracranial pressure (ICP) elevation given her history of idiopathic intracranial hypertension, neuro-retinitis. Testing so far without evidence of enhancement of inflammatory lesions, no VST suggesting against inflammatory etiology, however laboratory studies for atypical optic neuritis still pending. Planning for VPS w NSGY 9/24.      Update 9/23  - Entrance testing stable today  - Per neurosurgery, tentatively plan for shunt placement Tuesday pending OR clearance documentation from medicine/pulm      #Unilateral optic disc edema, right eye  - MRI Brain and orbits and MRV without enhancement of the optic nerve, demyelinating lesions, or VST  - Chest Xray without infiltrate or hilar adenopathy  - Laboratory workup so far unremarkable.  - Lumbar puncture 9/18 with elevated opening pressure to 52 mmHg, elevated protein, with borderline WBC of 6 (no cell type predominant)  - CT chest w IV con negative for sarcoidosis or intrathoracic pathology   - s/p IV solumedrol 1g q24h (9/17/24 -9/19 )   - Continue furosemide 40mg BID for elevated intracranial pressure (ICP).  - Allergy consulted for desensitization to diamox, deferring in favor of VPS for now  - Patient wishes to pursue VPS, NSGY tentatively planning for Tuesday OR     #Ocular hypertension, both eyes  - Possibly related to systemic steroid therapy  -  Defer timolol for now in setting of asthma  - Continue brimonidine BID, both eyes      Ophthalmology to continue to follow while inpatient     Thank you for the consult. Note not final until attending signature. Please contact the Ophthalmology service with further questions or concerns.     Pager: [phone]     Rob Estrada MD

## 2024-09-24 ENCOUNTER — APPOINTMENT (OUTPATIENT)
Dept: RADIOLOGY | Facility: HOSPITAL | Age: 49
End: 2024-09-24
Payer: MEDICAID

## 2024-09-24 ENCOUNTER — ANESTHESIA (OUTPATIENT)
Dept: OPERATING ROOM | Facility: HOSPITAL | Age: 49
End: 2024-09-24
Payer: MEDICAID

## 2024-09-24 LAB
ALBUMIN SERPL BCP-MCNC: 3.7 G/DL (ref 3.4–5)
ALP SERPL-CCNC: 65 U/L (ref 33–110)
ALT SERPL W P-5'-P-CCNC: 21 U/L (ref 7–45)
ANION GAP SERPL CALC-SCNC: 15 MMOL/L (ref 10–20)
APTT PPP: 33 SECONDS (ref 27–38)
AQP4 H2O CHANNEL IGG SERPL QL: NEGATIVE
AST SERPL W P-5'-P-CCNC: 11 U/L (ref 9–39)
ATRIAL RATE: 104 BPM
BASOPHILS # BLD AUTO: 0.01 X10*3/UL (ref 0–0.1)
BASOPHILS NFR BLD AUTO: 0.1 %
BILIRUB SERPL-MCNC: 0.3 MG/DL (ref 0–1.2)
BUN SERPL-MCNC: 18 MG/DL (ref 6–23)
CALCIUM SERPL-MCNC: 8.6 MG/DL (ref 8.6–10.6)
CHLORIDE SERPL-SCNC: 97 MMOL/L (ref 98–107)
CO2 SERPL-SCNC: 29 MMOL/L (ref 21–32)
CREAT SERPL-MCNC: 0.79 MG/DL (ref 0.5–1.05)
EGFRCR SERPLBLD CKD-EPI 2021: >90 ML/MIN/1.73M*2
EOSINOPHIL # BLD AUTO: 0.07 X10*3/UL (ref 0–0.7)
EOSINOPHIL NFR BLD AUTO: 1 %
ERYTHROCYTE [DISTWIDTH] IN BLOOD BY AUTOMATED COUNT: 14.6 % (ref 11.5–14.5)
GLUCOSE BLD MANUAL STRIP-MCNC: 156 MG/DL (ref 74–99)
GLUCOSE BLD MANUAL STRIP-MCNC: 164 MG/DL (ref 74–99)
GLUCOSE BLD MANUAL STRIP-MCNC: 192 MG/DL (ref 74–99)
GLUCOSE BLD MANUAL STRIP-MCNC: 233 MG/DL (ref 74–99)
GLUCOSE BLD MANUAL STRIP-MCNC: 247 MG/DL (ref 74–99)
GLUCOSE BLD MANUAL STRIP-MCNC: 272 MG/DL (ref 74–99)
GLUCOSE BLD MANUAL STRIP-MCNC: 306 MG/DL (ref 74–99)
GLUCOSE SERPL-MCNC: 184 MG/DL (ref 74–99)
HCG UR QL IA.RAPID: NEGATIVE
HCT VFR BLD AUTO: 36.9 % (ref 36–46)
HGB BLD-MCNC: 11.9 G/DL (ref 12–16)
HOLD SPECIMEN: NORMAL
IMM GRANULOCYTES # BLD AUTO: 0.03 X10*3/UL (ref 0–0.7)
IMM GRANULOCYTES NFR BLD AUTO: 0.4 % (ref 0–0.9)
IMMUNOLOGIST REVIEW: NORMAL
INR PPP: 1 (ref 0.9–1.1)
LYMPHOCYTES # BLD AUTO: 1.82 X10*3/UL (ref 1.2–4.8)
LYMPHOCYTES NFR BLD AUTO: 27.1 %
MAGNESIUM SERPL-MCNC: 2.06 MG/DL (ref 1.6–2.4)
MCH RBC QN AUTO: 28.4 PG (ref 26–34)
MCHC RBC AUTO-ENTMCNC: 32.2 G/DL (ref 32–36)
MCV RBC AUTO: 88 FL (ref 80–100)
MOG IGG1 SERPL QL FC: NEGATIVE
MONOCYTES # BLD AUTO: 0.5 X10*3/UL (ref 0.1–1)
MONOCYTES NFR BLD AUTO: 7.4 %
NEUTROPHILS # BLD AUTO: 4.29 X10*3/UL (ref 1.2–7.7)
NEUTROPHILS NFR BLD AUTO: 64 %
NRBC BLD-RTO: 0 /100 WBCS (ref 0–0)
P AXIS: 65 DEGREES
P OFFSET: 198 MS
P ONSET: 137 MS
PHOSPHATE SERPL-MCNC: 4 MG/DL (ref 2.5–4.9)
PLATELET # BLD AUTO: 254 X10*3/UL (ref 150–450)
POTASSIUM SERPL-SCNC: 3.8 MMOL/L (ref 3.5–5.3)
PR INTERVAL: 166 MS
PROT SERPL-MCNC: 6.9 G/DL (ref 6.4–8.2)
PROTHROMBIN TIME: 10.7 SECONDS (ref 9.8–12.8)
Q ONSET: 220 MS
QRS COUNT: 17 BEATS
QRS DURATION: 84 MS
QT INTERVAL: 362 MS
QTC CALCULATION(BAZETT): 476 MS
QTC FREDERICIA: 435 MS
R AXIS: 28 DEGREES
RBC # BLD AUTO: 4.19 X10*6/UL (ref 4–5.2)
SODIUM SERPL-SCNC: 137 MMOL/L (ref 136–145)
T AXIS: 55 DEGREES
T OFFSET: 401 MS
VENTRICULAR RATE: 104 BPM
WBC # BLD AUTO: 6.7 X10*3/UL (ref 4.4–11.3)

## 2024-09-24 PROCEDURE — 2720000007 HC OR 272 NO HCPCS: Performed by: NEUROLOGICAL SURGERY

## 2024-09-24 PROCEDURE — A62223 PR CREATE SHUNT:VENTRIC-PERITONEAL

## 2024-09-24 PROCEDURE — C1894 INTRO/SHEATH, NON-LASER: HCPCS | Performed by: NEUROLOGICAL SURGERY

## 2024-09-24 PROCEDURE — 70450 CT HEAD/BRAIN W/O DYE: CPT | Performed by: RADIOLOGY

## 2024-09-24 PROCEDURE — 2500000001 HC RX 250 WO HCPCS SELF ADMINISTERED DRUGS (ALT 637 FOR MEDICARE OP)

## 2024-09-24 PROCEDURE — 3700000002 HC GENERAL ANESTHESIA TIME - EACH INCREMENTAL 1 MINUTE: Performed by: NEUROLOGICAL SURGERY

## 2024-09-24 PROCEDURE — 82947 ASSAY GLUCOSE BLOOD QUANT: CPT

## 2024-09-24 PROCEDURE — 85025 COMPLETE CBC W/AUTO DIFF WBC: CPT

## 2024-09-24 PROCEDURE — 7100000001 HC RECOVERY ROOM TIME - INITIAL BASE CHARGE: Performed by: NEUROLOGICAL SURGERY

## 2024-09-24 PROCEDURE — 7100000002 HC RECOVERY ROOM TIME - EACH INCREMENTAL 1 MINUTE: Performed by: NEUROLOGICAL SURGERY

## 2024-09-24 PROCEDURE — 81025 URINE PREGNANCY TEST: CPT

## 2024-09-24 PROCEDURE — 2780000003 HC OR 278 NO HCPCS: Performed by: NEUROLOGICAL SURGERY

## 2024-09-24 PROCEDURE — C1729 CATH, DRAINAGE: HCPCS | Performed by: NEUROLOGICAL SURGERY

## 2024-09-24 PROCEDURE — 1100000001 HC PRIVATE ROOM DAILY

## 2024-09-24 PROCEDURE — 3700000001 HC GENERAL ANESTHESIA TIME - INITIAL BASE CHARGE: Performed by: NEUROLOGICAL SURGERY

## 2024-09-24 PROCEDURE — 85610 PROTHROMBIN TIME: CPT

## 2024-09-24 PROCEDURE — 2500000004 HC RX 250 GENERAL PHARMACY W/ HCPCS (ALT 636 FOR OP/ED): Performed by: ANESTHESIOLOGY

## 2024-09-24 PROCEDURE — 61781 SCAN PROC CRANIAL INTRA: CPT | Performed by: NEUROLOGICAL SURGERY

## 2024-09-24 PROCEDURE — 84100 ASSAY OF PHOSPHORUS: CPT

## 2024-09-24 PROCEDURE — 99232 SBSQ HOSP IP/OBS MODERATE 35: CPT

## 2024-09-24 PROCEDURE — 80053 COMPREHEN METABOLIC PANEL: CPT

## 2024-09-24 PROCEDURE — 00163J6 BYPASS CEREBRAL VENTRICLE TO PERITONEAL CAVITY WITH SYNTHETIC SUBSTITUTE, PERCUTANEOUS APPROACH: ICD-10-PCS | Performed by: NEUROLOGICAL SURGERY

## 2024-09-24 PROCEDURE — 2500000002 HC RX 250 W HCPCS SELF ADMINISTERED DRUGS (ALT 637 FOR MEDICARE OP, ALT 636 FOR OP/ED)

## 2024-09-24 PROCEDURE — 70450 CT HEAD/BRAIN W/O DYE: CPT

## 2024-09-24 PROCEDURE — 3600000009 HC OR TIME - EACH INCREMENTAL 1 MINUTE - PROCEDURE LEVEL FOUR: Performed by: NEUROLOGICAL SURGERY

## 2024-09-24 PROCEDURE — A62223 PR CREATE SHUNT:VENTRIC-PERITONEAL: Performed by: ANESTHESIOLOGY

## 2024-09-24 PROCEDURE — 2500000005 HC RX 250 GENERAL PHARMACY W/O HCPCS: Performed by: NEUROLOGICAL SURGERY

## 2024-09-24 PROCEDURE — 3600000004 HC OR TIME - INITIAL BASE CHARGE - PROCEDURE LEVEL FOUR: Performed by: NEUROLOGICAL SURGERY

## 2024-09-24 PROCEDURE — 0WJG4ZZ INSPECTION OF PERITONEAL CAVITY, PERCUTANEOUS ENDOSCOPIC APPROACH: ICD-10-PCS | Performed by: NEUROLOGICAL SURGERY

## 2024-09-24 PROCEDURE — 2500000005 HC RX 250 GENERAL PHARMACY W/O HCPCS: Performed by: ANESTHESIOLOGY

## 2024-09-24 PROCEDURE — 2500000004 HC RX 250 GENERAL PHARMACY W/ HCPCS (ALT 636 FOR OP/ED)

## 2024-09-24 PROCEDURE — 2500000004 HC RX 250 GENERAL PHARMACY W/ HCPCS (ALT 636 FOR OP/ED): Performed by: NEUROLOGICAL SURGERY

## 2024-09-24 PROCEDURE — 2500000005 HC RX 250 GENERAL PHARMACY W/O HCPCS

## 2024-09-24 PROCEDURE — 62223 ESTABLISH BRAIN CAVITY SHUNT: CPT | Performed by: NEUROLOGICAL SURGERY

## 2024-09-24 PROCEDURE — 83735 ASSAY OF MAGNESIUM: CPT

## 2024-09-24 DEVICE — IMPLANTABLE DEVICE: Type: IMPLANTABLE DEVICE | Site: CRANIAL | Status: FUNCTIONAL

## 2024-09-24 DEVICE — RESERVOIR, RICKHAM, STANDARD: Type: IMPLANTABLE DEVICE | Site: CRANIAL | Status: FUNCTIONAL

## 2024-09-24 DEVICE — VALVE, CERTAS PLUS, PROGRAMMABLE: Type: IMPLANTABLE DEVICE | Site: CRANIAL | Status: FUNCTIONAL

## 2024-09-24 RX ORDER — ONDANSETRON HYDROCHLORIDE 2 MG/ML
INJECTION, SOLUTION INTRAVENOUS AS NEEDED
Status: DISCONTINUED | OUTPATIENT
Start: 2024-09-24 | End: 2024-09-24

## 2024-09-24 RX ORDER — INSULIN LISPRO 100 [IU]/ML
0-10 INJECTION, SOLUTION INTRAVENOUS; SUBCUTANEOUS
Status: DISCONTINUED | OUTPATIENT
Start: 2024-09-24 | End: 2024-09-25 | Stop reason: HOSPADM

## 2024-09-24 RX ORDER — SODIUM CHLORIDE 0.9 G/100ML
IRRIGANT IRRIGATION AS NEEDED
Status: DISCONTINUED | OUTPATIENT
Start: 2024-09-24 | End: 2024-09-24 | Stop reason: HOSPADM

## 2024-09-24 RX ORDER — MORPHINE SULFATE 4 MG/ML
2 INJECTION INTRAVENOUS EVERY 5 MIN PRN
Status: DISCONTINUED | OUTPATIENT
Start: 2024-09-24 | End: 2024-09-24 | Stop reason: HOSPADM

## 2024-09-24 RX ORDER — HYDROCORTISONE 20 MG/1
20 TABLET ORAL ONCE
Status: COMPLETED | OUTPATIENT
Start: 2024-09-25 | End: 2024-09-25

## 2024-09-24 RX ORDER — MIDAZOLAM HYDROCHLORIDE 1 MG/ML
INJECTION INTRAMUSCULAR; INTRAVENOUS AS NEEDED
Status: DISCONTINUED | OUTPATIENT
Start: 2024-09-24 | End: 2024-09-24

## 2024-09-24 RX ORDER — ROCURONIUM BROMIDE 10 MG/ML
INJECTION, SOLUTION INTRAVENOUS AS NEEDED
Status: DISCONTINUED | OUTPATIENT
Start: 2024-09-24 | End: 2024-09-24

## 2024-09-24 RX ORDER — LABETALOL HYDROCHLORIDE 5 MG/ML
5 INJECTION, SOLUTION INTRAVENOUS ONCE AS NEEDED
Status: DISCONTINUED | OUTPATIENT
Start: 2024-09-24 | End: 2024-09-24 | Stop reason: HOSPADM

## 2024-09-24 RX ORDER — ONDANSETRON HYDROCHLORIDE 2 MG/ML
4 INJECTION, SOLUTION INTRAVENOUS ONCE AS NEEDED
Status: COMPLETED | OUTPATIENT
Start: 2024-09-24 | End: 2024-09-24

## 2024-09-24 RX ORDER — INSULIN GLARGINE 100 [IU]/ML
50 INJECTION, SOLUTION SUBCUTANEOUS ONCE
Status: COMPLETED | OUTPATIENT
Start: 2024-09-24 | End: 2024-09-24

## 2024-09-24 RX ORDER — HYDROCORTISONE 10 MG/1
10 TABLET ORAL ONCE
Status: COMPLETED | OUTPATIENT
Start: 2024-09-25 | End: 2024-09-25

## 2024-09-24 RX ORDER — DROPERIDOL 2.5 MG/ML
0.62 INJECTION, SOLUTION INTRAMUSCULAR; INTRAVENOUS ONCE AS NEEDED
Status: DISCONTINUED | OUTPATIENT
Start: 2024-09-24 | End: 2024-09-24 | Stop reason: HOSPADM

## 2024-09-24 RX ORDER — FENTANYL CITRATE 50 UG/ML
INJECTION, SOLUTION INTRAMUSCULAR; INTRAVENOUS AS NEEDED
Status: DISCONTINUED | OUTPATIENT
Start: 2024-09-24 | End: 2024-09-24

## 2024-09-24 RX ORDER — ALBUTEROL SULFATE 0.83 MG/ML
2.5 SOLUTION RESPIRATORY (INHALATION) ONCE AS NEEDED
Status: DISCONTINUED | OUTPATIENT
Start: 2024-09-24 | End: 2024-09-24 | Stop reason: HOSPADM

## 2024-09-24 RX ORDER — HYDROCORTISONE 5 MG/1
5 TABLET ORAL ONCE
Status: COMPLETED | OUTPATIENT
Start: 2024-09-25 | End: 2024-09-25

## 2024-09-24 RX ORDER — OXYCODONE HYDROCHLORIDE 5 MG/1
5 TABLET ORAL EVERY 4 HOURS PRN
Status: DISCONTINUED | OUTPATIENT
Start: 2024-09-24 | End: 2024-09-24 | Stop reason: HOSPADM

## 2024-09-24 RX ORDER — PROPOFOL 10 MG/ML
INJECTION, EMULSION INTRAVENOUS AS NEEDED
Status: DISCONTINUED | OUTPATIENT
Start: 2024-09-24 | End: 2024-09-24

## 2024-09-24 RX ORDER — CEFAZOLIN 1 G/1
INJECTION, POWDER, FOR SOLUTION INTRAVENOUS AS NEEDED
Status: DISCONTINUED | OUTPATIENT
Start: 2024-09-24 | End: 2024-09-24

## 2024-09-24 RX ORDER — MIDAZOLAM HYDROCHLORIDE 1 MG/ML
1 INJECTION INTRAMUSCULAR; INTRAVENOUS ONCE AS NEEDED
Status: DISCONTINUED | OUTPATIENT
Start: 2024-09-24 | End: 2024-09-24 | Stop reason: HOSPADM

## 2024-09-24 RX ORDER — ACETAMINOPHEN 325 MG/1
650 TABLET ORAL ONCE
Status: DISCONTINUED | OUTPATIENT
Start: 2024-09-24 | End: 2024-09-24 | Stop reason: HOSPADM

## 2024-09-24 RX ORDER — SODIUM CHLORIDE, SODIUM LACTATE, POTASSIUM CHLORIDE, CALCIUM CHLORIDE 600; 310; 30; 20 MG/100ML; MG/100ML; MG/100ML; MG/100ML
INJECTION, SOLUTION INTRAVENOUS CONTINUOUS PRN
Status: DISCONTINUED | OUTPATIENT
Start: 2024-09-24 | End: 2024-09-24

## 2024-09-24 RX ORDER — SODIUM CHLORIDE, SODIUM LACTATE, POTASSIUM CHLORIDE, CALCIUM CHLORIDE 600; 310; 30; 20 MG/100ML; MG/100ML; MG/100ML; MG/100ML
100 INJECTION, SOLUTION INTRAVENOUS CONTINUOUS
Status: DISCONTINUED | OUTPATIENT
Start: 2024-09-24 | End: 2024-09-24 | Stop reason: HOSPADM

## 2024-09-24 RX ORDER — LIDOCAINE HYDROCHLORIDE 10 MG/ML
0.1 INJECTION, SOLUTION INFILTRATION; PERINEURAL ONCE
Status: DISCONTINUED | OUTPATIENT
Start: 2024-09-24 | End: 2024-09-24 | Stop reason: HOSPADM

## 2024-09-24 RX ORDER — MEPERIDINE HYDROCHLORIDE 25 MG/ML
12.5 INJECTION INTRAMUSCULAR; INTRAVENOUS; SUBCUTANEOUS EVERY 10 MIN PRN
Status: DISCONTINUED | OUTPATIENT
Start: 2024-09-24 | End: 2024-09-24 | Stop reason: HOSPADM

## 2024-09-24 RX ORDER — MORPHINE SULFATE 4 MG/ML
4 INJECTION INTRAVENOUS EVERY 5 MIN PRN
Status: DISCONTINUED | OUTPATIENT
Start: 2024-09-24 | End: 2024-09-24 | Stop reason: HOSPADM

## 2024-09-24 RX ORDER — INSULIN GLARGINE 100 [IU]/ML
50 INJECTION, SOLUTION SUBCUTANEOUS EVERY MORNING
Status: DISCONTINUED | OUTPATIENT
Start: 2024-09-25 | End: 2024-09-25 | Stop reason: HOSPADM

## 2024-09-24 RX ORDER — PHENYLEPHRINE HCL IN 0.9% NACL 0.4MG/10ML
SYRINGE (ML) INTRAVENOUS AS NEEDED
Status: DISCONTINUED | OUTPATIENT
Start: 2024-09-24 | End: 2024-09-24

## 2024-09-24 RX ORDER — LIDOCAINE HCL/PF 100 MG/5ML
SYRINGE (ML) INTRAVENOUS AS NEEDED
Status: DISCONTINUED | OUTPATIENT
Start: 2024-09-24 | End: 2024-09-24

## 2024-09-24 RX ORDER — LEVETIRACETAM 500 MG/5ML
INJECTION, SOLUTION, CONCENTRATE INTRAVENOUS AS NEEDED
Status: DISCONTINUED | OUTPATIENT
Start: 2024-09-24 | End: 2024-09-24

## 2024-09-24 RX ORDER — POLYMYXIN B 500000 [USP'U]/1
INJECTION, POWDER, LYOPHILIZED, FOR SOLUTION INTRAMUSCULAR; INTRATHECAL; INTRAVENOUS; OPHTHALMIC AS NEEDED
Status: DISCONTINUED | OUTPATIENT
Start: 2024-09-24 | End: 2024-09-24 | Stop reason: HOSPADM

## 2024-09-24 ASSESSMENT — PAIN SCALES - PAIN ASSESSMENT IN ADVANCED DEMENTIA (PAINAD): TOTALSCORE: MEDICATION (SEE MAR)

## 2024-09-24 ASSESSMENT — PAIN SCALES - GENERAL
PAINLEVEL_OUTOF10: 0 - NO PAIN
PAINLEVEL_OUTOF10: 8
PAINLEVEL_OUTOF10: 0 - NO PAIN
PAINLEVEL_OUTOF10: 6
PAINLEVEL_OUTOF10: 6
PAINLEVEL_OUTOF10: 5 - MODERATE PAIN
PAINLEVEL_OUTOF10: 7

## 2024-09-24 ASSESSMENT — PAIN - FUNCTIONAL ASSESSMENT: PAIN_FUNCTIONAL_ASSESSMENT: 0-10

## 2024-09-24 NOTE — ANESTHESIA PROCEDURE NOTES
Peripheral IV  Date/Time: 9/24/2024 7:40 AM      Placement  Needle size: 20 G  Laterality: left  Location: hand  Site prep: alcohol  Technique: anatomical landmarks  Attempts: 3  Difficult Venous Access: Yes  Unsuccessful Attempt Location(s): left forearm

## 2024-09-24 NOTE — ANESTHESIA PROCEDURE NOTES
Airway  Date/Time: 9/24/2024 7:38 AM  Urgency: elective    Airway not difficult    Staffing  Performed: CRNA   Authorized by: Forest Dodd MD    Performed by: Forest Dodd MD  Patient location during procedure: OR    Indications and Patient Condition  Indications for airway management: anesthesia and airway protection  Spontaneous Ventilation: absent  Sedation level: deep  Preoxygenated: yes  Patient position: sniffing  MILS maintained throughout  Mask difficulty assessment: 2 - vent by mask + OA or adjuvant +/- NMBA  Planned trial extubation    Final Airway Details  Final airway type: endotracheal airway      Successful airway: ETT  Cuffed: yes   Successful intubation technique: direct laryngoscopy  Facilitating devices/methods: intubating stylet  Endotracheal tube insertion site: oral  Blade: Jeremy  Blade size: #4  ETT size (mm): 7.0  Cormack-Lehane Classification: grade IIa - partial view of glottis  Placement verified by: chest auscultation and capnometry   Number of attempts at approach: 1  Ventilation between attempts: none    Additional Comments  Easy direct laryngoscope airway with head ramped up/optimal positioning.

## 2024-09-24 NOTE — SIGNIFICANT EVENT
Neurosurgery postoperative recommendations      Patient is s/p right frontal  shunt (Certas MRI-safe valve with antisiphon guard set at 5. This valve does not need to be checked or re-dialed after MRI imaging)    STAT postoperative CT head without contrast  SBP<160 through POD2; okay for DVT chemoppx POD2  Okay for PTOT POD0; OOB TID with WBAT  Continue home meds as appropriate  Ensure patient has appropriate neuro-ophtho eval as outpatient to assess again for papilledema per their timeline discretion  If there are any issues with severe abdominal pain or issues with eating/drinking, please page us.    Page [phone] with any questions or concerns regarding the plan of care.

## 2024-09-24 NOTE — PROGRESS NOTES
"Jonathan Ayala is a 48 y.o. female on day 7 of admission presenting with Vision changes.    Subjective   Patient was seen and examined today, in PACU postop for right  shunt.  Patient missed her a.m. glargine dose 20 units, as well as hydrocortisone, per chart review she received dexamethasone 4 mg at 8 AM IntraOp.  She remained hemodynamically stable postop       Objective   Constitutional:       Appearance: Normal appearance. She is obese.   Eyes:      Extraocular Movements: Extraocular movements intact.      Conjunctiva/sclera: Conjunctivae normal.      Comments: VH exam not done      Rate and Rhythm: Normal rate and regular rhythm.   Pulmonary:      Effort: Pulmonary effort is normal.   Abdominal:      General: Abdomen is flat.   Musculoskeletal:      Right lower leg: No edema.      Left lower leg: No edema.   Skin:     General: Skin is warm and dry.   R VPS  Neurological:      Mental Status: She is alert and oriented to person, place, and time.   Psychiatric:         Mood and Affect: Mood normal.          Last Recorded Vitals  Blood pressure 119/75, pulse 102, temperature 36.3 °C (97.3 °F), temperature source Temporal, resp. rate 18, height 1.575 m (5' 2\"), weight 118 kg (261 lb), SpO2 93%.  Intake/Output last 3 Shifts:  I/O last 3 completed shifts:  In: - (0 mL/kg)   Out: 700 (5.9 mL/kg) [Urine:700 (0.2 mL/kg/hr)]  Weight: 118.4 kg     Relevant Results  Results from last 7 days   Lab Units 09/24/24  1243 09/24/24  1112 09/24/24  0545 09/24/24  0406 09/24/24  0218 09/24/24  0216 09/23/24  1257 09/23/24  0932 09/22/24  1617 09/22/24  1206 09/21/24  0817 09/21/24  0605 09/20/24  0833 09/20/24  0640   POCT GLUCOSE mg/dL 247* 233* 164* 156*  --  192*   < >  --    < >  --    < >  --    < >  --    GLUCOSE mg/dL  --   --   --   --  184*  --   --  208*  --  316*  --  182*  --  347*    < > = values in this interval not displayed.     Scheduled medications  acetaminophen, 650 mg, oral, Once  amitriptyline, 100 mg, oral, " Nightly  clonazePAM, 0.5 mg, oral, BID  divalproex, 500 mg, oral, BID  [Held by provider] enoxaparin, 40 mg, subcutaneous, Daily  ezetimibe, 10 mg, oral, Daily  fluticasone propion-salmeteroL, 2 puff, inhalation, BID  folic acid, 1 mg, oral, Daily  [Held by provider] furosemide, 40 mg, oral, BID  [START ON 9/25/2024] hydrocortisone, 10 mg, oral, Once  [START ON 9/25/2024] hydrocortisone, 20 mg, oral, Once  [START ON 9/25/2024] hydrocortisone, 5 mg, oral, Once  [START ON 9/25/2024] insulin glargine, 50 Units, subcutaneous, q AM  insulin lispro, 0-10 Units, subcutaneous, TID  insulin lispro, 18 Units, subcutaneous, TID  lacosamide, 250 mg, oral, BID  levETIRAcetam, 1,500 mg, oral, BID  levothyroxine, 75 mcg, oral, Daily before breakfast  montelukast, 10 mg, oral, Nightly  polyethylene glycol, 17 g, oral, Daily  propranolol, 20 mg, oral, TID  prucalopride, 1 tablet, oral, Daily  rOPINIRole, 0.5 mg, oral, q AM  rOPINIRole, 1 mg, oral, Nightly  sennosides, 1-2 tablet, oral, Nightly      Continuous medications     PRN medications  PRN medications: [DISCONTINUED] acetaminophen **OR** acetaminophen, dextrose, dextrose, dextrose, glucagon, glucagon, glucagon, ipratropium-albuteroL, ondansetron ODT **OR** ondansetron              Results for orders placed or performed during the hospital encounter of 09/17/24 (from the past 24 hour(s))   POCT GLUCOSE   Result Value Ref Range    POCT Glucose 191 (H) 74 - 99 mg/dL   Urinalysis with Reflex Culture and Microscopic   Result Value Ref Range    Color, Urine Light-Yellow Light-Yellow, Yellow, Dark-Yellow    Appearance, Urine Clear Clear    Specific Gravity, Urine 1.023 1.005 - 1.035    pH, Urine 5.5 5.0, 5.5, 6.0, 6.5, 7.0, 7.5, 8.0    Protein, Urine NEGATIVE NEGATIVE, 10 (TRACE), 20 (TRACE) mg/dL    Glucose, Urine Normal Normal mg/dL    Blood, Urine NEGATIVE NEGATIVE    Ketones, Urine TRACE (A) NEGATIVE mg/dL    Bilirubin, Urine NEGATIVE NEGATIVE    Urobilinogen, Urine Normal Normal  mg/dL    Nitrite, Urine NEGATIVE NEGATIVE    Leukocyte Esterase, Urine NEGATIVE NEGATIVE   Extra Urine Gray Tube   Result Value Ref Range    Extra Tube Hold for add-ons.    Type and Screen   Result Value Ref Range    ABO TYPE A     Rh TYPE POS     ANTIBODY SCREEN NEG    Coagulation Screen   Result Value Ref Range    Protime 11.4 9.8 - 12.8 seconds    INR 1.0 0.9 - 1.1    aPTT 32 27 - 38 seconds   ECG 12 Lead   Result Value Ref Range    Ventricular Rate 104 BPM    Atrial Rate 104 BPM    HI Interval 166 ms    QRS Duration 84 ms    QT Interval 362 ms    QTC Calculation(Bazett) 476 ms    P Axis 65 degrees    R Axis 28 degrees    T Axis 55 degrees    QRS Count 17 beats    Q Onset 220 ms    P Onset 137 ms    P Offset 198 ms    T Offset 401 ms    QTC Fredericia 435 ms   POCT GLUCOSE   Result Value Ref Range    POCT Glucose 202 (H) 74 - 99 mg/dL   POCT GLUCOSE   Result Value Ref Range    POCT Glucose 213 (H) 74 - 99 mg/dL   hCG, Urine, Qualitative   Result Value Ref Range    HCG, Urine NEGATIVE NEGATIVE   POCT GLUCOSE   Result Value Ref Range    POCT Glucose 192 (H) 74 - 99 mg/dL   CBC and Auto Differential   Result Value Ref Range    WBC 6.7 4.4 - 11.3 x10*3/uL    nRBC 0.0 0.0 - 0.0 /100 WBCs    RBC 4.19 4.00 - 5.20 x10*6/uL    Hemoglobin 11.9 (L) 12.0 - 16.0 g/dL    Hematocrit 36.9 36.0 - 46.0 %    MCV 88 80 - 100 fL    MCH 28.4 26.0 - 34.0 pg    MCHC 32.2 32.0 - 36.0 g/dL    RDW 14.6 (H) 11.5 - 14.5 %    Platelets 254 150 - 450 x10*3/uL    Neutrophils % 64.0 40.0 - 80.0 %    Immature Granulocytes %, Automated 0.4 0.0 - 0.9 %    Lymphocytes % 27.1 13.0 - 44.0 %    Monocytes % 7.4 2.0 - 10.0 %    Eosinophils % 1.0 0.0 - 6.0 %    Basophils % 0.1 0.0 - 2.0 %    Neutrophils Absolute 4.29 1.20 - 7.70 x10*3/uL    Immature Granulocytes Absolute, Automated 0.03 0.00 - 0.70 x10*3/uL    Lymphocytes Absolute 1.82 1.20 - 4.80 x10*3/uL    Monocytes Absolute 0.50 0.10 - 1.00 x10*3/uL    Eosinophils Absolute 0.07 0.00 - 0.70 x10*3/uL     Basophils Absolute 0.01 0.00 - 0.10 x10*3/uL   Magnesium   Result Value Ref Range    Magnesium 2.06 1.60 - 2.40 mg/dL   Comprehensive Metabolic Panel   Result Value Ref Range    Glucose 184 (H) 74 - 99 mg/dL    Sodium 137 136 - 145 mmol/L    Potassium 3.8 3.5 - 5.3 mmol/L    Chloride 97 (L) 98 - 107 mmol/L    Bicarbonate 29 21 - 32 mmol/L    Anion Gap 15 10 - 20 mmol/L    Urea Nitrogen 18 6 - 23 mg/dL    Creatinine 0.79 0.50 - 1.05 mg/dL    eGFR >90 >60 mL/min/1.73m*2    Calcium 8.6 8.6 - 10.6 mg/dL    Albumin 3.7 3.4 - 5.0 g/dL    Alkaline Phosphatase 65 33 - 110 U/L    Total Protein 6.9 6.4 - 8.2 g/dL    AST 11 9 - 39 U/L    Bilirubin, Total 0.3 0.0 - 1.2 mg/dL    ALT 21 7 - 45 U/L   Coagulation Screen   Result Value Ref Range    Protime 10.7 9.8 - 12.8 seconds    INR 1.0 0.9 - 1.1    aPTT 33 27 - 38 seconds   Phosphorus   Result Value Ref Range    Phosphorus 4.0 2.5 - 4.9 mg/dL   POCT GLUCOSE   Result Value Ref Range    POCT Glucose 156 (H) 74 - 99 mg/dL   POCT GLUCOSE   Result Value Ref Range    POCT Glucose 164 (H) 74 - 99 mg/dL   POCT GLUCOSE   Result Value Ref Range    POCT Glucose 233 (H) 74 - 99 mg/dL   POCT GLUCOSE   Result Value Ref Range    POCT Glucose 247 (H) 74 - 99 mg/dL     *Note: Due to a large number of results and/or encounters for the requested time period, some results have not been displayed. A complete set of results can be found in Results Review.      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 Endocrine was consulted on 9/20/24  for management of Inpatient Hyperglycemia.       Diabetes history:  -Diagnosed 2002 -2003  -Follows with Dr. Felix and her group OP (Last seen on 9/16 -[facility].Upcoming appointment on 10/10/23 - [facility])  -Diabetes Home Regimen:   Toujeo  50 units AM   Carb Ratio - 1:2 TID (Undercarb estimating)  Sliding Scale -  1 unit for every 30 > 200 and  2 units for every  30 >200 at bedtime.   -Diabetes Complications:   Gastroparesis/ Peripheral Neuropathy   -Monitors blood sugar via Dexcom G7   -Previously on Tandem - short period of time- reportedly hypoglycemia.   With Omnipod recently - adhesive rxn.   -Previous treatment:   Ozempic and Metformin (Had GI side effects)    Glucocorticoid during hospital stay:  3 Doses of Methylprednisone 1 g given at 4 pm daily -Last dose - 9/18   Followed by 1 dose of Dexamethasone 4 mg on 9/21  Resumed on home dose HC 10 mg daily on 9/22 - 9/23 [per orders patient is supposed to be on 10 twice daily, only taking 10 mg in the morning].  VPS placement- Received Dexa 4 mg on 9/24 at  am intra-op    Type 2 diabetes: Poorly controlled in setting of acute illness, glucocorticoid use, and stress  Recommendations:  ADOD 9/25  -Glargine full dose: 50 units now and next dose tomorrow morning at 9 am (communicated earlier to primary team)  -Resume sched lispro 18 units tid with meals once diet order is resumed  -resume sliding scale insulin lispro #2 [2 units for every 50 above 150] tid with meals  -accynes ACHS  -Adult diabetic diet 60 g    If no plans for continuing dexamethasone per primary team:  We recommend Hydrocortisone tomorrow  20 mg in am  10 mg at noon  and 5 mg at 4 pm     Plan was communicated to the primary team via secure messaging    Marcela Vazquez MD  Endocrinology, PGY 5  Pager [phone] or secure chat     Case discussed with   Katerin

## 2024-09-24 NOTE — ASSESSMENT & PLAN NOTE
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 Endocrine was consulted on 9/20/24  for management of Inpatient Hyperglycemia.       Diabetes history:  -Diagnosed 2002 -2003  -Follows with Dr. Felix and her group OP (Last seen on 9/16 -[facility].Upcoming appointment on 10/10/23 - [facility])  -Diabetes Home Regimen:   Toujeo  50 units AM   Carb Ratio - 1:2 TID (Undercarb estimating)  Sliding Scale -  1 unit for every 30 > 200 and  2 units for every  30 >200 at bedtime.   -Diabetes Complications:   Gastroparesis/ Peripheral Neuropathy   -Monitors blood sugar via Dexcom G7   -Previously on Tandem - short period of time- reportedly hypoglycemia.   With Omnipod recently - adhesive rxn.   -Previous treatment:   Ozempic and Metformin (Had GI side effects)    Glucocorticoid during hospital stay:  3 Doses of Methylprednisone 1 g given at 4 pm daily -Last dose - 9/18   Followed by 1 dose of Dexamethasone 4 mg on 9/21  Resumed on home dose HC 10 mg daily on 9/22 - 9/23 [per orders patient is supposed to be on 10 twice daily, only taking 10 mg in the morning].  VPS placement- Received Dexa 4 mg on 9/24 at  am intra-op    Type 2 diabetes: Poorly controlled in setting of acute illness, glucocorticoid use, and stress  Recommendations:  ADOD 9/25  -Glargine full dose: 50 units now and next dose tomorrow morning at 9 am (communicated earlier to primary team)  -Resume sched lispro 18 units tid with meals once diet order is resumed  -resume sliding  scale insulin lispro #2 [2 units for every 50 above 150] tid with meals  -accucheks ACHS  -Adult diabetic diet 60 g    If no plans for continuing dexamethasone per primary team:  We recommend Hydrocortisone tomorrow  20 mg in am  10 mg at noon  and 5 mg at 4 pm     Plan was communicated to the primary team via secure messaging    Marcela Vazquez MD  Endocrinology, PGY 5  Pager [phone] or secure chat     Case discussed with Dr. Conklin

## 2024-09-24 NOTE — OP NOTE
Right Ventricular Peritoneal shunt placement (R) Operative Note     Date: 2024  OR Location: [facility] OR    Name: Jonathan Ayala, : 1975, Age: 48 y.o., MRN: [id number], Sex: female    Diagnosis  Pre-op Diagnosis      * IIH (idiopathic intracranial hypertension) [G93.2] Post-op Diagnosis     * IIH (idiopathic intracranial hypertension) [G93.2]     Procedures  Right Ventricular Peritoneal shunt placement  60375 - HI CRTJ SHUNT [id number] TERMINUS    Right frontal ventriculo-peritoneal shunt (certas with antisiphon MRI SAFE at 5)  Laparoscopy; use of neuronavigation    Surgeons      * Avelino Tafoya - Primary    Resident/Fellow/Other Assistant:  Surgeons and Role:     * Norm Monson MD - Resident - Assisting    Procedure Summary  Anesthesia: General  ASA: III  Anesthesia Staff: Anesthesiologist: Forest Dodd MD  CRNA: RAUL Ramirez-ELAINE, DNP; CATALINO Forman  Estimated Blood Loss: 20mL  Intra-op Medications:   Administrations occurring from 0645 to 0955 on 24:   Medication Name Total Dose   sodium chloride 0.9 % irrigation solution 2,000 mL   gentamicin (Garamycin) 80 mg, polymyxin B 500,000 Units in sodium chloride 0.9 % 1,000 mL irrigation 1,000 mL   clonazePAM (KlonoPIN) tablet 0.5 mg Cannot be calculated   divalproex (Depakote ER) 24 hr tablet 500 mg Cannot be calculated   hydrocortisone (Cortef) tablet 20 mg Cannot be calculated   insulin glargine (Lantus) injection 20 Units Cannot be calculated   insulin lispro (HumaLOG) injection 0-10 Units Cannot be calculated   lacosamide (Vimpat) tablet 250 mg 50 mg   prucalopride tablet 2 mg Cannot be calculated   rOPINIRole (Requip) tablet 0.5 mg Cannot be calculated   oxygen (O2) therapy 48 L              Anesthesia Record               Intraprocedure I/O Totals          Intake    LR infusion 1000.00 mL    Total Intake 1000 mL       Output    Est. Blood Loss 20 mL    Total Output 20 mL       Net    Net Volume  980 mL          Specimen: No specimens collected     Staff:   Circulator: Ankit Guadalupe Person: Elicia         Drains and/or Catheters:   Intracranial Pressure/Ventriculostomy Ventricular drainage catheter Right Occipital region (Active)       Tourniquet Times:         Implants:  Implants       Type Name Action Serial No.      Neuro Interventional Implant VALVE, CERTAS PLUS, PROGRAMMABLE - [id number] Implanted      Neuro Interventional Implant RESERVOIR, RICKHAM, STANDARD - [id number] Implanted       peritoneal catheter Implanted               Findings: good CSF flow    Indications: Jonathan Ayala is an 48 y.o. female who is having surgery for IIH (idiopathic intracranial hypertension) [G93.2].     The patient was seen in the preoperative area. The risks, benefits, complications, treatment options, non-operative alternatives, expected recovery and outcomes were discussed with the patient. The possibilities of reaction to medication, pulmonary aspiration, injury to surrounding structures, bleeding, recurrent infection, the need for additional procedures, failure to diagnose a condition, and creating a complication requiring transfusion or operation were discussed with the patient. The patient concurred with the proposed plan, giving informed consent.  The site of surgery was properly noted/marked if necessary per policy. The patient has been actively warmed in preoperative area. Preoperative antibiotics have been ordered and given within 1 hours of incision. Venous thrombosis prophylaxis have been ordered including bilateral sequential compression devices    Procedure Details:     The patient was brought back from preop to OR. A safety huddle was performed and anesthesia was induced. The right side of the head and abdomen were cleansed, prepped and draped in usual sterile fashion. Local anesthetic was used to infiltrate skin over incision. Axiem with attached to forehead and stealth was registered with confirmation  of good accuracy.    A C-shaped incision was placed over Kocher's point on the right side of the head. A burrhole was made with acorn; dura was coagulated with bipolar electrocautery.  A pocket was made under the scalp for the shunt valve and a relaxing incision was made just behind the ear. Then, cruciate incision was made in dura, and dural leaflets and noe were coagulated. Ventricular catheter was passed into ventricle with good return of CSF. Rickham reservoir was connected to proximal catheter.    Then, abdominal access was made with laparoscopic trochar; abdomen was insufflated and intraperitoneal placement was confirmed with laparoscopy. There were significant adhesion which were lysed using the laparoscope. A second abdominal site was accessed with a sharp beveled needle; Guide wire and peel-away sheath was placed in second incision with intraperitoneal placement again confirmed.    Shunt tunneler was passed from postauricular incision to abdominal incision; trochar was removed and tubing was passed through the retained tunneling sheath. This tubing was brought from  postauricular incision to C-shaped scalp incision. Valve was attached to this A-tubing and sheath was removed. Rickham was attached to valve. Attachment sites were secured with silk ties; flow into distal tubing was confirmed by pumping shunt reservoir.    Distal A-tubing was then passed through peel-away sheath and sheath was removed after tubing was confirmed to be intra-abdominal on laparoscopy.    Scalp and postauricular incisions were closed with 2-0 vicryls followed by running 4-0 monocryl. Abdominal incisions closed with inverted 2-0 vicryls and dermabond. All counts were correct at end of case and patient was turned over to anesthesia for extubation.    The use of abdominal laparoscopy for placement of the peritoneal portion of the shunt increased the difficulty of the case by 33%       Complications:  None; patient tolerated the  procedure well.    Disposition: PACU - hemodynamically stable.  Condition: stable         Additional Details: significnat abdominal adhesions    Attending Attestation: I was present and scrubbed for the key portions of the procedure.    Avelino Tafoya  Phone Number: [phone]

## 2024-09-24 NOTE — CARE PLAN
The patient's goals for the shift include      The clinical goals for the shift include pt will remain safe and free from falls/injury    Over the shift, the patient did not have any falls/injury.      Problem: Pain - Adult  Goal: Verbalizes/displays adequate comfort level or baseline comfort level  Outcome: Progressing     Problem: Safety - Adult  Goal: Free from fall injury  Outcome: Progressing     Problem: Discharge Planning  Goal: Discharge to home or other facility with appropriate resources  Outcome: Progressing     Problem: Chronic Conditions and Co-morbidities  Goal: Patient's chronic conditions and co-morbidity symptoms are monitored and maintained or improved  Outcome: Progressing

## 2024-09-24 NOTE — PROGRESS NOTES
"Jonathan Ayala is a 48 y.o. female on day 7 of admission presenting with Vision changes.    Subjective   Jonathan Ayala is a 48 y.o. 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      Interval Updates:  No acute events overnight.    Morning encounter:   This morning patient was down in OR. Seen in afternoon      Objective     Last Recorded Vitals  Blood pressure 119/75, pulse 102, temperature 36.3 °C (97.3 °F), temperature source Temporal, resp. rate 18, height 1.575 m (5' 2\"), weight 118 kg (261 lb), SpO2 93%.    Physical Exam  HENT:      Head: Normocephalic.      Comments: S/p shunt placement with no bleeding/fluid or redness  Eyes:      General: Lids are normal.      Extraocular Movements: Extraocular movements intact.   Cardiovascular:      Pulses: Normal pulses.      Heart sounds: Normal heart sounds.   Pulmonary:      Effort: Pulmonary effort is normal.      Breath sounds: Normal breath sounds.   Neurological:      Mental Status: She is alert.      Motor: Motor strength is normal.     Deep Tendon Reflexes:      Reflex Scores:       Bicep reflexes are 2+ on the right side and 2+ on the left side.       Brachioradialis reflexes are 2+ on the right side and 2+ on the left side.       Patellar reflexes are 1+ on the right side and 1+ on the left side.       Achilles reflexes are 1+ on the right side and 1+ on the left side.  Psychiatric:         Speech: Speech normal.     Neurological Exam  Mental Status  Alert. Oriented to person, place and time. Recent and remote memory are intact. Speech is normal. Attention and concentration are " normal.    Cranial Nerves  CN II: Right visual acuity: Finger movement. Left visual acuity: Finger movement. Right homonymous hemianopsia. Left normal visual field. Visual field testing improving  Able to count fingers in L eye in all visual fields  Able to count fingers in R superior nasal and temporal fields, repots seeing gray and able to see movement in lower fields.  CN III, IV, VI: Extraocular movements intact bilaterally. Normal lids and orbits bilaterally.   Right pupil: 3 mm.   Left pupil: 3 mm. Pain endorsed with rightward gaze in right eye..  CN V: Facial sensation is normal.  CN VII: Full and symmetric facial movement.  CN VIII: Hearing is normal.  CN IX, X: Palate elevates symmetrically  CN XI: Shoulder shrug strength is normal.  CN XII: Tongue midline without atrophy or fasciculations.  Right eye with blurry vision in the nasal inferior. Left eye with baseline decreased peripheral visual acuity.    Motor  Normal muscle bulk throughout. No fasciculations present. Normal muscle tone. Strength is 5/5 throughout all four extremities.    Sensory  Pinprick is normal in upper and lower extremities. Diminished cold sensation in distal lower extremities.. Vibration abnormality: Decreased vibration in distal lower extremities -- upon re-examination, some vibration intact in upper body but diminished in feet and toes. . Proprioception is normal in upper and lower extremities.     Reflexes                                            Right                      Left  Brachioradialis                    2+                         2+  Biceps                                 2+                         2+  Patellar                                1+                         1+  Achilles                                1+                         1+    Coordination  Right: Finger-to-nose normal. Rapid alternating movement normal. Heel-to-shin normal.    Gait    Deferred.    Results from last 72 hours   Lab Units 09/24/24  9288  09/23/24  0932   WBC AUTO x10*3/uL 6.7 8.6   NRBC AUTO /100 WBCs 0.0 0.0   RBC AUTO x10*6/uL 4.19 4.51   HEMOGLOBIN g/dL 11.9* 12.6   HEMATOCRIT % 36.9 39.7   MCV fL 88 88   MCH pg 28.4 27.9   MCHC g/dL 32.2 31.7*   RDW % 14.6* 14.4   PLATELETS AUTO x10*3/uL 254 266   NEUTROS PCT AUTO % 64.0 71.0   IG PCT AUTO % 0.4 0.5   LYMPHS PCT AUTO % 27.1 21.6   MONOS PCT AUTO % 7.4 5.9   EOS PCT AUTO % 1.0 0.9   BASOS PCT AUTO % 0.1 0.1   NEUTROS ABS x10*3/uL 4.29 6.12   IG AUTO x10*3/uL 0.03 0.04   LYMPHS ABS AUTO x10*3/uL 1.82 1.86   MONOS ABS AUTO x10*3/uL 0.50 0.51   EOS ABS AUTO x10*3/uL 0.07 0.08   BASOS ABS AUTO x10*3/uL 0.01 0.01      Results from last 72 hours   Lab Units 09/24/24  0218 09/23/24  0932   GLUCOSE mg/dL 184* 208*   SODIUM mmol/L 137 135*   POTASSIUM mmol/L 3.8 3.3*   CHLORIDE mmol/L 97* 96*   CO2 mmol/L 29 26   ANION GAP mmol/L 15 16   BUN mg/dL 18 18   CREATININE mg/dL 0.79 0.87   EGFR mL/min/1.73m*2 >90 83   CALCIUM mg/dL 8.6 9.2   PHOSPHORUS mg/dL 4.0 2.4*   ALBUMIN g/dL 3.7 4.1   MAGNESIUM mg/dL 2.06 2.20      Results from last 72 hours   Lab Units 09/24/24  0218 09/23/24  0932   ALK PHOS U/L 65  --    BILIRUBIN TOTAL mg/dL 0.3  --    PROTEIN TOTAL g/dL 6.9  --    ALT U/L 21  --    AST U/L 11  --    ALBUMIN g/dL 3.7 4.1      Results from last 72 hours   Lab Units 09/24/24  0218 09/23/24  1833   PROTIME seconds 10.7 11.4   INR  1.0 1.0   APTT seconds 33 32        MRI Brain w and wo IV contrast, MRI Orbit w and wo IV contrast, MRV w and wo IV contrast 9/17  There is similar mild brain parenchymal volume loss when compared with 06/05/2024.      An area of encephalomalacia is again noted within the right frontal lobe convexity. The gradient echo T2 images again demonstrate a small amount of curvilinear blooming dark signal along the margins of the  area of encephalomalacia suggesting associated hemosiderin deposition anterior dystrophic calcification/mineralization.      The high-resolution MRI images  of the orbits again demonstrate asymmetric atrophy and increased STIR signal within the left optic nerve when compared with the right similar when compared with the  prior MRI of the orbits dated 06/05/2024. Again, no corresponding abnormal enhancement is identified.      The intracranial MRV is within normal limits without evidence of venous sinus thrombosis.    Scheduled medications  acetaminophen, 650 mg, oral, Once  amitriptyline, 100 mg, oral, Nightly  clonazePAM, 0.5 mg, oral, BID  divalproex, 500 mg, oral, BID  [Held by provider] enoxaparin, 40 mg, subcutaneous, Daily  ezetimibe, 10 mg, oral, Daily  fluticasone propion-salmeteroL, 2 puff, inhalation, BID  folic acid, 1 mg, oral, Daily  [Held by provider] furosemide, 40 mg, oral, BID  hydrocortisone, 20 mg, oral, q AM  insulin glargine, 20 Units, subcutaneous, q AM  insulin lispro, 0-10 Units, subcutaneous, q4h  [Held by provider] insulin lispro, 18 Units, subcutaneous, TID  lacosamide, 250 mg, oral, BID  levETIRAcetam, 1,500 mg, oral, BID  levothyroxine, 75 mcg, oral, Daily before breakfast  montelukast, 10 mg, oral, Nightly  polyethylene glycol, 17 g, oral, Daily  propranolol, 20 mg, oral, TID  prucalopride, 1 tablet, oral, Daily  rOPINIRole, 0.5 mg, oral, q AM  rOPINIRole, 1 mg, oral, Nightly  sennosides, 1-2 tablet, oral, Nightly      Continuous medications     PRN medications  PRN medications: [DISCONTINUED] acetaminophen **OR** acetaminophen, dextrose, dextrose, dextrose, glucagon, glucagon, glucagon, ipratropium-albuteroL, ondansetron ODT **OR** ondansetron      Assessment/Plan      Assessment & Plan  IIH (idiopathic intracranial hypertension)    Jonathan Ayala is a 48 y.o. right handed female w/ a complex neurological PMHx of IIH (dx 2/2022 w/ OP 25) s/p R frontal bolt c/b tract hemorrhage and SAH and focal epilepsy, right hemiplegic migraines, and other PMHx of CVID on monthly IVIG infusions, Sjogren's syndrome/scleroderma, POTS, T2DM, HTN,  hypothyroidism, BRENT on CPAP, anxiety/depression admitted for a 1 month history of right eye blurred vision. . In terms of etiology for unilateral optic disc edema, atypical optic neuritis remains on the differential vs neuro-retinitis.  Lumbar puncture mostly noticeable for elevated protein and opening pressure of 52, but no pleocytosis with predominant cell type. Headache was greatly improved after LP and vision continues to improve. This makes the differential of IIH more enticing. She has a history of allergy to diamox which she has refused densitization for. After discussion with patient, she understands the risks of  shunt iso CVID diagnosis and would like to proceed with this procedure. S/p  shunt placement 9/24 without complications.    Updates 9/23:  - s/p  shunt placement  - Stopped brimonidine drops per ophthalmology recs  - Following endocrinology recs for blood sugar control  - Plan for discharge tomorrow    #R optic disc edema   #c/f Optic neuritis vs IIH  ::Follows with Dr Mederos outpatient  ::9/17 Serum toxoplasma IgG noreactive  ::9/18 Syphilis Ab reactive, RPR nonreactive (most likely Ab from IVIG)  ::9/18 LP opening pressure 52, WBC 6 (36% PMNs, 29% lymphocytes), 1000 RBC (Tube 1), protein 79, glucose 154 (POCT glucose >300), closing pressure 22, meningitis panel negative, HSV negative, VDRL nonreactive, cytology without malignant cells, flow cytometry without discrete population of B cells, negative for oligoclonal banding, elevated IgG  :: s/p IV Solumedrol 1g daily for three days (last dose 9/19 6AM)  :: CSF studies all negative: West Nile, VZV, Toxoplasma, Meningitis panel, VDRL, HSV, Fungal culture  :: CSF with WBC 6, RBC 1000, protein 79, glucose 154, IgG 13.7, OCB negative   :: CT chest w IV con negative for sarcoidosis or intrathoracic pathology  :: s/p  shunt placement 9/24  - Allergy: deferring diamox desensitization in favor of VPS, patient agrees and understands risks and  benefits  - Brimonidine discontinued per ophthalmology recs     #IIH s/o R frontal bolt 2022 c/b focal epilepsy   #R hemiplegic migraines   - c/w divalproex 500 mg BID   - c/w lasix 40mg BID, if hypotensive polus prn  - c/w Lacosamide 250 mg BID   - c/w Keppra 1500 mg BID   - strict I&Os for volume status    #Hypothyroidism   - c/w Levothyroxine 75 mcg daily      #Asthma   - c/w motelukast 10 mg daily   - c/w duonebs PRN   - c/w Breo Ellipta daily      #CVID   #Sjorgen's syndrome   - IVIG infusions every 14 days, last infusion 09/12     #POTS  - propranolol 20mg TID    #HLD   - c/w Zetia 10 mg daily      #T2DM   - lantus 50 qAM, lispro 18 TID, sliding scale  - Endocrine consulted, appreciate recs  - Glucose checks AC HS  - hypoglycemia protocol      MISC: c/w ropinirole BID (0.5mg qAM, 1mg qPM)      F: PRN  E: PRN  N: Carb controlled  A: PIV      O2: Room air   Bowel Regimen: Miralax, Sennokot nightly, Motegrity  DVT ppx: On hold until POD2     Code Status: FULL CODE     Giacomo Olguin MD  Department of Neurology, PGY-2  General [id number]

## 2024-09-24 NOTE — PROGRESS NOTES
"Jonathan Ayala is a 48 y.o. female on day 7 of admission presenting with Vision changes.    Subjective   No complaints    Objective     Physical Exam  AOx3  R 5/5  LUE 5/5  LLE 5/5    Last Recorded Vitals  Blood pressure 118/66, pulse 104, temperature 36 °C (96.8 °F), temperature source Temporal, resp. rate 16, height 1.575 m (5' 2\"), weight 118 kg (261 lb), SpO2 96%.  Intake/Output last 3 Shifts:  I/O last 3 completed shifts:  In: - (0 mL/kg)   Out: 700 (5.9 mL/kg) [Urine:700 (0.2 mL/kg/hr)]  Weight: 118.4 kg     Relevant Results    Assessment/Plan   Principal Problem:    Vision changes  Active Problems:    Type 2 diabetes mellitus with hyperglycemia, with long-term current use of insulin (Multi)    Difficult intubation    IIH (idiopathic intracranial hypertension)    48yF h/o IIH (dx 2/2022 w/ OP 25) s/p RF bolt (Lottie) c/b tract hemorrhage, SAH, and focal epilepsy, R hemiplegic migraines, optic neuropathy, CVID (IVIG qmonthly), Sjogren's syndrome, Scleroderma, fibromyalgia, chronic pain syndrome, POTS, T2DM, HTN, hypothyroidism, BRENT on CPAP, morbid obesity, GERD, gastroparesis s/p pyeloplasty, NAFLD, anxiety, depression, 9/17 p/w 1 month of R eye blurry vision, and intermittent pulsatile tinnitus, MRI brain RF encephalomalacia, MR orbit STIR signal within L optic nerve, MRV negative, 9/18 s/p LP (OP 52), 9/21 s/p BAT for L hemiplegia, CTH/CTA stable RF encephalomalacia, no LVO, MRI neg  9/23 vCTH done   RCRI class III    Recommendations  Neurology primary  OR Tues 9/24 for L VPS       Rom Hicks MD      "

## 2024-09-24 NOTE — CONSULTS
[facility]  ACUTE CARE SURGERY - HISTORY AND PHYSICAL / CONSULT    Patient Name: Jonathan Ayala  MRN: [id number]  Admit Date: 917  : 1975  AGE: 48 y.o.   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 [facility] consulted for abdominal placement of  shunt.    Pt states she is not on any home anticoagulation.    PAST MEDICAL HISTORY:   PMH:  Past Medical History:   Diagnosis Date    Abnormal findings on diagnostic imaging of other abdominal regions, including retroperitoneum 10/14/2020    Abnormal CT of the abdomen    Acquired deformity of nose 2022    Nasal deformity    Acute upper respiratory infection, unspecified 10/16/2019    Acute URI    Allergy status to unspecified drugs, medicaments and biological substances 2020    History of drug allergy    Allergy status to unspecified drugs, medicaments and biological substances 2020    History of adverse drug reaction    Benign intracranial hypertension 2022    Pseudotumor cerebri    Bipolar disorder, unspecified (Multi)     Bipolar disease, chronic    Breast calcification, right 2018    Cellulitis of abdominal wall 2022    Cellulitis of right abdominal wall    Cervicalgia 2020    Cervicalgia of gavruuhv-rbxzsdi-ysnhx region    Chronic maxillary sinusitis 2022    Chronic maxillary sinusitis    Chronic sialoadenitis 2020    Chronic sialoadenitis    COVID-19 2022    COVID-19 with  multiple comorbidities    Decreased white blood cell count, unspecified 11/04/2019    Leukopenia    Disease of thyroid gland     Disturbances of salivary secretion 03/16/2020    Xerostomia    Dry eye syndrome of bilateral lacrimal glands 10/07/2022    Dry eyes, bilateral    Encounter for preprocedural cardiovascular examination 02/01/2022    Preoperative cardiovascular examination    Food additives allergy status 06/11/2020    Allergy to food dye    Fracture of nasal bones, initial encounter for closed fracture 03/03/2022    Closed fracture of nasal bone, initial encounter    Granuloma of right orbit 10/07/2021    Inflammatory pseudotumor of right orbit    History of endometrial ablation 11/09/2017    Hyperlipidemia     Hypertension     Hyperthyroidism     Hypoglycemia     Hypothyroidism     Localized swelling, mass and lump, head 03/24/2022    Swollen nose    Major depressive disorder, recurrent, in full remission (CMS-HCC) 10/07/2021    Depression, major, recurrent, in complete remission    Mammary duct ectasia of left breast 08/24/2022    Periductal mastitis of left breast    Migraine     Nipple discharge 08/24/2022    Bloody discharge from left nipple    Ocular pain, right eye 10/07/2022    Pain in right eye    Optic atrophy     Other abnormal and inconclusive findings on diagnostic imaging of breast 07/06/2020    Other abnormal and inconclusive findings on diagnostic imaging of breast    Other anomalies of pupillary function 05/31/2019    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    Other chest pain 05/18/2020    Chest discomfort    Other conditions influencing health status 08/01/2022    History of cough    Other conditions influencing health status 08/03/2021    Chronic migraine    Other specified disorders of eustachian tube, left ear 11/18/2019    ETD (Eustachian tube dysfunction), left    Other specified disorders of nose and nasal sinuses 03/24/2022    Nasal dryness    Other specified disorders of  nose and nasal sinuses 03/24/2022    Nasal crusting    Pelvic and perineal pain 07/06/2020    Pelvic pain    Personal history of other diseases of the circulatory system 04/07/2020    History of sinus tachycardia    Personal history of other diseases of the circulatory system 04/07/2020    History of abnormal electrocardiography    Personal history of other diseases of the circulatory system     History of Raynaud's syndrome    Personal history of other diseases of the digestive system     History of irritable bowel syndrome    Personal history of other diseases of the digestive system 03/02/2020    History of oral pain    Personal history of other diseases of the musculoskeletal system and connective tissue 01/19/2022    History of neck pain    Personal history of other diseases of the musculoskeletal system and connective tissue 03/02/2021    History of scleroderma    Personal history of other diseases of the musculoskeletal system and connective tissue 06/16/2020    History of muscle weakness    Personal history of other diseases of the nervous system and sense organs 11/18/2019    History of hearing loss    Personal history of other diseases of the nervous system and sense organs 09/21/2022    History of partial seizures    Personal history of other diseases of the respiratory system 04/14/2021    History of asthma    Personal history of other endocrine, nutritional and metabolic disease 02/17/2021    History of diabetes mellitus    Personal history of other mental and behavioral disorders 05/27/2021    History of anxiety    Personal history of other specified conditions 09/07/2022    History of nipple discharge    Personal history of other specified conditions 10/16/2019    History of headache    Personal history of other specified conditions 09/28/2022    History of lump of left breast    Personal history of other specified conditions 09/16/2021    History of persistent cough    Personal history of other  specified conditions 02/01/2022    History of palpitations    Personal history of other specified conditions 03/09/2022    History of headache    Personal history of other specified conditions 02/12/2014    History of chest pain    Personal history of other specified conditions 10/27/2021    History of nausea and vomiting    Personal history of other specified conditions 10/16/2019    History of fatigue    Personal history of other specified conditions 02/26/2021    History of orthopnea    Personal history of other specified conditions 02/22/2021    History of shortness of breath    Personal history of urinary calculi     H/O renal calculi    Polycystic ovary syndrome     Postural orthostatic tachycardia syndrome (POTS)     POTS (postural orthostatic tachycardia syndrome)    Rash and other nonspecific skin eruption 03/15/2022    Rash    Repeated falls 06/23/2021    Recurrent falls    Right lower quadrant pain 10/14/2020    Abdominal pain, RLQ (right lower quadrant)    Sjogren syndrome, unspecified (Multi)     History of Sjogren's disease    Sjogren's syndrome (Multi)     Slow transit constipation 07/09/2020    Slow transit constipation    Subarachnoid hemorrhage, traumatic (Multi) 04/19/2023    Thyroid nodule     Traumatic subarachnoid hemorrhage with loss of consciousness of unspecified duration, subsequent encounter 03/15/2022    Subarachnoid hemorrhage following injury, with loss of consciousness, subsequent encounter    Traumatic subarachnoid hemorrhage without loss of consciousness, subsequent encounter     Subarachnoid hemorrhage following injury, no loss of consciousness, subsequent encounter    Type 2 diabetes mellitus (Multi)     Unspecified disorder of refraction 10/07/2022    Refractive error    Unspecified optic neuritis 11/06/2020    Right optic neuritis    Unspecified optic neuritis 11/06/2020    Optic neuritis, right    Unspecified visual loss 09/25/2019    Vision loss    Venous insufficiency  (chronic) (peripheral) 10/18/2021    Chronic venous insufficiency of lower extremity    Viral infection, unspecified 01/11/2022    Nonspecific syndrome suggestive of viral illness    Vitamin D deficiency     Vitamin D deficiency, unspecified 09/28/2022    Vitamin D deficiency       PSH:  Past Surgical History:   Procedure Laterality Date    APPENDECTOMY  2017    HERNIA REPAIR Right 03/01/2024    with mesh    HYSTERECTOMY  2017    MR HEAD ANGIO WO IV CONTRAST  02/08/2021    MR HEAD ANGIO WO IV CONTRAST 2/8/2021 [facility]ICAL [facility]GAC[facility]    MR NECK ANGIO WO IV CONTRAST  02/08/2021    MR NECK ANGIO WO IV CONTRAST 2/8/2021 [facility] CLINICAL LEGAC[facility]    OTHER SURGICAL HISTORY  08/22/2019    Carpal tunnel surgery    OTHER SURGICAL HISTORY  08/22/2019    Hysterectomy    OTHER SURGICAL HISTORY  08/22/2019    Venous access port placement    OTHER SURGICAL HISTORY  08/22/2019    Cholecystectomy    OTHER SURGICAL HISTORY  08/22/2019    Appendectomy    OTHER SURGICAL HISTORY  08/22/2019    Pyloroplasty    WISDOM TOOTH EXTRACTION  2004     FH:  Family History   Problem Relation Name Age of Onset    Other (Perforated bowel) Mother Radha     Hypertension Mother Radha     Macular degeneration Mother Radha     Hyperlipidemia Father Mac     Hypertension Father Mac     Heart attack Father Mac     Other (S/P CABG) Father Mac     Diabetes Father Mac     Prostate cancer Father Mac     Coronary artery disease Father Mac     Heart failure Father Mac     Stroke Father Mac     Cancer Father Mac     Macular degeneration Father Mac     Retinal detachment Father Mac     Stroke Sister Jazmin     Breast cancer Sister Jazmin     Lupus Sister Jazmin     Ovarian cancer Sister Jazmin     Astigmatism Sister Jazmin     Hyperlipidemia Brother Sanchez     Hypertension Brother Sanchez     Astigmatism Brother Sanchez     Diabetes Father's Sister Linda     Blindness Father's Sister Linda     Thyroid cancer Father's Sister Bekah     Thyroid disease Father's  Sister Jessica     Colon cancer Father's Brother ?     Blindness Other Multiple     Melanoma Other      Asthma Other      Other (hay fever) Other      Allergies Other      Cancer Maternal Grandmother Brenda     Cancer Father's Brother Kian      SOCIAL HISTORY:    Smoking:  Social History     Tobacco Use   Smoking Status Never   Smokeless Tobacco Never       Alcohol:   Social History     Substance and Sexual Activity   Alcohol Use Not Currently    Comment: Socially in College       MEDICATIONS:  Prior to Admission medications    Medication Sig Start Date End Date Taking? Authorizing Provider   acetaminophen (Tylenol) 325 mg tablet Take 1-2 tablets (325-650 mg) by mouth every 6 hours if needed for mild pain (1 - 3). Days of infusions   Yes Historical Provider, MD   Advair -21 mcg/actuation inhaler INHALE TWO PUFFS BY MOUTH AS INSTRUCTED TWO TIMES A DAY. 7/10/24  Yes Historical Provider, MD   amitriptyline (Elavil) 100 mg tablet Take 1 tablet (100 mg) by mouth once daily at bedtime. 7/17/24 7/17/25 Yes Isidoro Mensah,    azelastine (Astelin) 137 mcg (0.1 %) nasal spray Administer 1 spray into each nostril 2 times a day. Use in each nostril as directed   Yes Historical Provider, MD   calcium-vitamin D3-vitamin K (Viactiv) 650 mg-12.5 mcg-40 mcg chewable tablet Chew 2 tablets once daily. At lunch   Yes Historical Provider, MD   carboxymethylcellulose (Refresh Celluvisc) 1 % ophthalmic solution dropperette Administer 2 drops into both eyes 3 times a day as needed for dry eyes.   Yes Historical Provider, MD   cholecalciferol (Vitamin D-3) 5,000 Units tablet Take 1 tablet (5,000 Units) by mouth once daily. 11/2/22  Yes Historical Provider, MD   clonazePAM (KlonoPIN) 0.5 mg tablet Take 1 tablet (0.5 mg) by mouth 2 times a day. at the same time.   Yes Historical Provider, MD   diclofenac (Voltaren) 50 mg EC tablet Take 1 tablet (50 mg) by mouth 3 times a day as needed (migraine). 30 day supply 1/18/24  Yes Evelyn  MACKENZIE Bai MD   diphenhydrAMINE (BENADryl) 12.5 mg/5 mL liquid Take 10-20 mL (25-50 mg) by mouth once daily as needed for allergies (or migraines).   Yes Historical Provider, MD   divalproex (Depakote ER) 500 mg 24 hr tablet Take 1 tablet (500 mg) by mouth 2 times a day. 12/24/20  Yes Historical Provider, MD   ezetimibe (Zetia) 10 mg tablet Take 1 tablet (10 mg) by mouth once daily. 4/22/24 4/22/25 Yes Marah Felix MD   fluticasone (Flonase) 50 mcg/actuation nasal spray USE 1 SPRAY INTO EACH NOSTRIL ONCE DAILY. 6/24/24  Yes Isidoro Mensah DO   folic acid (Folvite) 1 mg tablet Take 1 tablet (1 mg) by mouth once daily. 1/9/24  Yes Isidoro Mensah DO   furosemide (Lasix) 20 mg tablet Take 1 tablet (20 mg) by mouth 2 times a day. 1/9/24  Yes Isidoro Mensah DO   hydrocortisone (Cortef) 10 mg tablet TAKE ONE TABLET BY MOUTH TWO TIMES A DAY; TAKE DOUBLE DOSES FOR 3 DAYS WHEN ILL  Patient taking differently: Take 1 tablet (10 mg) by mouth once daily in the morning. 5/17/24  Yes Marah Felix MD   insulin glargine (Toujeo Max Solostar- 2 unit dial) 300 unit/mL (3 mL) injection Inject 50 units under skin once daily 9/12/24  Yes Marah Felix MD   insulin lispro (HumaLOG KwikPen Insulin) 100 unit/mL injection Use as directed to give up to 100 units a day  Patient taking differently: Use as directed to give up to 100 units a day per sliding scale 8/14/24  Yes Marah Felix MD   lacosamide (Vimpat) 200 mg tablet tablet Take 1 tablet (200 mg) by mouth 2 times a day. 3/28/22  Yes Historical Provider, MD   lacosamide (Vimpat) 50 mg tablet Take 1 tablet (50 mg) by mouth every 12 hours. 6/23/23  Yes Historical Provider, MD   levETIRAcetam (Keppra) 750 mg tablet Take 2 tablets (1,500 mg) by mouth 2 times a day.   Yes Historical Provider, MD   levothyroxine (Synthroid, Levoxyl) 50 mcg tablet Take 1.5 tablets (75 mcg) by mouth once daily in the morning. Take before meals. 3/14/24  Yes Marah Felix,  MD   miconazole (Micotin) 2 % powder Apply to groin , under the breast and skin folds daily   Yes Historical Provider, MD   moisturizing mouth (Biotene Oral Dry Mouth) solution Take 1 spray by mouth 4 times a day as needed.   Yes Historical Provider, MD   montelukast (Singulair) 10 mg tablet Take 1 tablet (10 mg) by mouth once daily at bedtime. 7/5/24  Yes Kristina Shanks APRN-CNP   Motegrity 2 mg tablet Take 1 tablet (2 mg) by mouth once daily. 5/9/22  Yes Historical Provider, MD   naratriptan (Amerge) 1 mg tablet Take 1 tablet (1 mg) by mouth 1 time if needed for migraine (may repeat x1). May repeat in 4 hours if unresolved. Do not exceed 5 mg in 24 hours. 8/6/24  Yes Evelyn Bai MD   ondansetron ODT (Zofran-ODT) 4 mg disintegrating tablet Take 1 tablet (4 mg) by mouth every 8 hours if needed for nausea or vomiting.   Yes Historical Provider, MD   pantoprazole (ProtoNix) 40 mg EC tablet Take 1 tablet (40 mg) by mouth once daily. Do not crush, chew, or split. 1/5/24 9/17/24 Yes Isidoro Mensah,    promethazine (Phenergan) 12.5 mg tablet Take 1 tablet (12.5 mg) by mouth if needed. 8/18/23  Yes Historical Provider, MD   propranolol LA (Inderal LA) 60 mg 24 hr capsule Take 1 capsule (60 mg) by mouth once daily. Do not crush, chew, or split. 6/18/24 6/18/25 Yes Isidoro Mensah DO   rOPINIRole (Requip) 0.5 mg tablet Take 1 tablet (0.5 mg) by mouth once daily in the morning. 1 tab in AM and 2 tabs in PM   Yes Historical Provider, MD   senna 8.6 mg tablet Take 1-2 tablets (8.6-17.2 mg) by mouth as needed at bedtime for constipation. 7/31/23  Yes Historical Provider, MD   tiZANidine (Zanaflex) 2 mg tablet Take 1 tablet (2 mg) by mouth every 8 hours if needed for muscle spasms. 2/16/24 9/17/24 Yes Isidoro Mensah, DO   ubrogepant (Ubrelvy) 100 mg tablet tablet Take 1 tablet (100 mg) by mouth if needed (migraine). May repeat in 2 hours for max of 200mg per 24 hours. 9/11/24 9/11/25 Yes Evelyn Bai,  "MD   ZINC ORAL Take 50 mg by mouth once daily. Take as directed   Yes Historical Provider, MD   amLODIPine (Norvasc) 5 mg tablet Take 1 tablet (5 mg) by mouth once daily. 10/12/23 10/11/24  Historical Provider, MD   BD Ultra-Fine Mini Pen Needle 31 gauge x 3/16\" needle USE AS DIRECTED FIVE TIMES A DAY. 3/24/23   Historical Provider, MD   blood-glucose sensor (Dexcom G6 Sensor) device Use as directed. Change sensors every 10 days 9/10/24   Marah Felix MD   blood-glucose transmitter device (Dexcom G6 Transmitter) device Use as instructed. Change every 90 days 9/10/24   Marah Felix MD   dapagliflozin propanediol (Farxiga) 5 mg Take 1 tablet (5 mg) by mouth once daily.  Patient not taking: Reported on 9/17/2024 8/12/24 8/12/25  Marah Felix MD   diclofenac sodium (Voltaren Arthritis Pain) 1 % gel Apply 4.5 inches (4 g) topically 3 times a day as needed (knee pain).  Patient not taking: Reported on 9/17/2024 7/22/24   Marty R Lejeune, DO   EPINEPHrine 0.3 mg/0.3 mL injection syringe INJECT INTRAMUSCULARLY ONCE AS NEEDED FOR ANAPHYLAXIS 5/17/24   Isidoro Mensah, DO   glucagon (Baqsimi) 3 mg/actuation spray,non-aerosol USE ONE SPRAY IN THE NOSE AS NEEDED FOR LOW BLOOD SUGAR  MAY REPEAT AFTER 15 MINUTES USING A NEW DEVICE IF NO RESPONSE 4/2/24   Marah Felix MD   immune globulin, human, (Gammagard) infusion Infuse 600 mL (60 g) into a venous catheter every 14 (fourteen) days. 7/7/23   Historical Provider, MD   insulin pump cart,automated,BT (Omnipod 5 G6 Pods, Gen 5,) cartridge Inject 1 Device under the skin every other day.  Patient not taking: Reported on 9/17/2024 7/15/24   Marah Felix MD   ipratropium-albuteroL (Duo-Neb) 0.5-2.5 mg/3 mL nebulizer solution Inhale 3 mL 4 times a day as needed.    Historical Provider, MD   lasmiditan 100 mg tablet Take 100 mg by mouth if needed (migraine). Do not drive in 8 hours of taking this medicine. Maximum one dose per 24 hours. 9/3/24 9/3/25  " Evelyn Bai MD   nasal spray Nayzilam 5 mg/spray (0.1 mL) spray,non-aerosol Administer into affected nostril(s). 11/16/22   Historical Provider, MD   OneTouch Delica Plus Lancet 33 gauge misc USE 1 LANCET TO CHECK GLUCOSE ONCE DAILY AS DIRECTED 1/30/23   Historical Provider, MD   OneTouch Verio test strips strip USE 1 STRIP TO CHECK GLUCOSE ONCE DAILY AS DIRECTED    Historical Provider, MD   rimegepant (Nurtec ODT) 75 mg tablet,disintegrating Take 1 tablet (75 mg) by mouth if needed (migraine).  Patient not taking: Reported on 9/17/2024 7/19/24   Evelyn Bai MD   scopolamine (Transderm-Scop) 1 mg over 3 days patch 3 day Place 1 patch over 72 hours on the skin every 3rd day.  Patient not taking: Reported on 9/17/2024 9/2/24   Emmanuel Parnell DO   SUMAtriptan (Imitrex) 25 mg tablet Take 1 tablet (25 mg) by mouth 1 time if needed for migraine. May repeat dose once in 2 hours if no relief.  Do not exceed 2 doses in 24 hours.  Patient not taking: Reported on 9/17/2024 8/6/24   Evelyn Bai MD     ALLERGIES:  Allergies   Allergen Reactions    Acetazolamide Hives, Rash, Shortness of breath and Swelling     oral hives, redness, ABD pain    Atorvastatin Unknown     Causes muscle pain    Cefdinir Itching, Rash, Shortness of breath and Anaphylaxis     Itchy throat    Throat closing / difficulty breathing.  Took Benadryl at home.    Itchy throat      Throat closing / difficulty breathing.  Took Benadryl at home.    Ceftriaxone Anaphylaxis, Rash, Shortness of breath and Swelling     SOB    SOB hives turned red during gallbladder    Doxepin Anaphylaxis, Hives, Swelling, Angioedema and Rash     Itchy sore throat problems with swallowing    facial swelling    Itchy sore throat problems with swallowing      facial swelling    Duloxetine Anaphylaxis, Hallucinations and Rash     suicidal ideation    suicidal ideation     Other reaction(s): suicidal ideation    suicidal    Suicidal ideation    Levofloxacin Angioedema, Swelling  "and Rash     eyelid swelling    eyelid swelling. Patient tolerates Avelox    Levofloxacin In D5w Anaphylaxis     Moon face lips swelling just Levaquin tolerated D5W)    Nutritional Supplements Anaphylaxis     Grapes ( red dye    Ozempic [Semaglutide] Nausea/vomiting     Severe gastroparesis worsening     Prochlorperazine Anaphylaxis     HIGH Compazine involuntary muscle spasms low doses tolerated)    Red Dye Anaphylaxis    Becky Anaphylaxis and Swelling     Becky    SOB facial swelling    Rosemary Oil Anaphylaxis, Itching and Swelling     Becky  SOB facial swelling      SOB facial swelling    Strawberry Shortness of breath     White blisters in mouth      Other reaction(s): white blisters in mouth, shortness of breath    Sulfa (Sulfonamide Antibiotics) Anaphylaxis, Rash, Shortness of breath and Swelling     SOB itchy hives    Other Reaction(s): rash, SOB      SOB itchy hives    Topiramate Anaphylaxis, Swelling and Angioedema     eyelid swelling    Throat swelling    eyelid swelling  Throat swelling      Throat swelling      eyelid swelling    Tree Pollen-Black [place] Shortness of breath     Other Reaction(s): Other: See Comments      White blisters in mouth      blisters in mouth-carries an EPIPEN-\"I have gotten short of breath\"    Tree Pollen-Pecan Shortness of breath     pecans    [place] Shortness of breath    Aripiprazole Unknown, Fever and Other     fever and tremors    Fevers and Tremors    Tremor    Aspartame Diarrhea     Blood sugar [place], Diarrhea    Aspartame (Bulk) Diarrhea, Nausea Only and Nausea/vomiting     Other Reaction(s): nausea, diarrhea, abdominal pain      Blood sugar [place], Diarrhea    Fenofibrate Other     Double vision    Gluten Itching, Other and Rash     Rashes constipation gastric discomfort    Hydrochlorothiazide Hives, Itching and Rash     hctz removed as free-text allergy and entered as Lain[place]um allergy,1455 10/6/2007JO      itchy hives    Hydromorphone Unknown, GI intolerance and " Nausea Only     Intolerance nausea instant    Iron Hives and Rash     ichy hive ( can tolerate ferrous gluconate)    Meclizine Angioedema, Other and Swelling     eyelid swelling    Swelling of the eyelids    Eyelids and face    Metformin Other     Other reaction(s): Dyspepsia, Dyspepsia    Propoxyphene Unknown and Hives     Darvocet itchy hives    Statins-Hmg-Coa Reductase Inhibitors Unknown     Double vision    Tetracyclines Hives, Itching and Rash     sulfa    Rash itching    Itchy  rash    Thiazides Hives, Itching and Rash     itchy hives    Venlafaxine Unknown and Fever     Fevers chills    Wheat Unknown     oral blisters    Adhesive Tape-Silicones Itching and Other    Barbiturates Unknown    Ciprofloxacin Hives    Dhe Unknown     Raynaud's disease    Diet Foods Diarrhea     Aspartame allergy only. Removes to many foods to interface.    Farxiga [Dapagliflozin] Other     Frequent yeast infections and UTI    Ferrous Sulfate Hives    Nsaids (Non-Steroidal Anti-Inflammatory Drug) Unknown and Nausea/vomiting    Other Unknown    Sulfonylureas Unknown    Tobramycin Unknown    Vancomycin Unknown and Hives     Niyah syndrome    Other reaction(s): Other    Red man syndrome if given fast, benadryl with.     Niyah syndrome  Other reaction(s): Other      Other reaction(s): Other      Red man syndrome if given fast, benadryl with.    Adhesive Rash    Azithromycin Hives, Itching and Rash    Betamethasone Nausea And Vomiting, Other, GI intolerance and Diarrhea     N/V/D    House Dust Rash    Metoclopramide Hcl Other    Prednisone Rash    Propoxyphene-Acetaminophen Hives and Rash    Sulfacetamide Sodium Rash and Swelling    [id number] Antimigraine Agents Nausea Only         None due to Raynaud's  ( Triptans cause a stroke)    Zolpidem Other and Hallucinations     Sleep walk    Other Reaction(s): insomnia and agitation      Sleep walk       REVIEW OF SYSTEMS:  Review of Systems  PHYSICAL EXAM:  Physical Exam  NAD, resting  comfortably  No increased WOB  Abdomen soft, non-tender, non-distended. Unable to appreciate any fluid wave d/t large abdomen  Voiding spontaneously  IMAGING SUMMARY:  (summary of findings, not a copy of dictation)  No new    LABS:  Results from last 7 days   Lab Units 09/24/24  0218 09/23/24  0932 09/22/24  1206   WBC AUTO x10*3/uL 6.7 8.6 8.1   HEMOGLOBIN g/dL 11.9* 12.6 12.7   HEMATOCRIT % 36.9 39.7 38.8   PLATELETS AUTO x10*3/uL 254 266 259   NEUTROS PCT AUTO % 64.0 71.0 80.7   LYMPHS PCT AUTO % 27.1 21.6 12.2   MONOS PCT AUTO % 7.4 5.9 6.8   EOS PCT AUTO % 1.0 0.9 0.0     Results from last 7 days   Lab Units 09/24/24  0218 09/23/24  1833   APTT seconds 33 32   INR  1.0 1.0     Results from last 7 days   Lab Units 09/24/24  0218 09/23/24  0932 09/22/24  1206   SODIUM mmol/L 137 135* 135*   POTASSIUM mmol/L 3.8 3.3* 3.8   CHLORIDE mmol/L 97* 96* 96*   CO2 mmol/L 29 26 27   BUN mg/dL 18 18 18   CREATININE mg/dL 0.79 0.87 0.92   CALCIUM mg/dL 8.6 9.2 9.3   PROTEIN TOTAL g/dL 6.9  --   --    BILIRUBIN TOTAL mg/dL 0.3  --   --    ALK PHOS U/L 65  --   --    ALT U/L 21  --   --    AST U/L 11  --   --    GLUCOSE mg/dL 184* 208* 316*     Results from last 7 days   Lab Units 09/24/24  0218   BILIRUBIN TOTAL mg/dL 0.3             I have reviewed all laboratory and imaging results ordered/pertinent for this encounter.       Patient seen and plans discussed with Chief Resident Dr. Simerlink. To be staffed with [facility] AM attending.    Luara Guevara MD PGY-1  Acute Care Surgery [id number]

## 2024-09-24 NOTE — ANESTHESIA POSTPROCEDURE EVALUATION
Patient: Jonathan Ayala    Procedure Summary       Date: 09/24/24 Room / Location: [facility] OR 25 / [facility] [place]    Anesthesia Start: 0726 Anesthesia Stop: 0959    Procedure: Right Ventricular Peritoneal shunt placement (Right) Diagnosis:       IIH (idiopathic intracranial hypertension)      (IIH (idiopathic intracranial hypertension) [G93.2])    Surgeons: Avelino Tafoya MD Responsible Provider: Forest Dodd MD    Anesthesia Type: general ASA Status: 3            Anesthesia Type: general    Vitals Value Taken Time   /69 09/24/24 1030   Temp 36 °C (96.8 °F) 09/24/24 0947   Pulse 99 09/24/24 1044   Resp 20 09/24/24 1044   SpO2 99 % 09/24/24 1044   Vitals shown include unfiled device data.    Anesthesia Post Evaluation    Patient location during evaluation: bedside  Patient participation: complete - patient participated  Level of consciousness: sleepy but conscious  Pain management: adequate  Airway patency: patent  Cardiovascular status: acceptable  Respiratory status: acceptable  Hydration status: acceptable  Postoperative Nausea and Vomiting: none  Comments: Patient is somnolent but arousable.  No current complaints;  PACU nurse at bedside.  Pain reasonably controlled.  Normothermic.  Euvolemic and hemodynamically stable.  Stable respiratory status;  no current nausea or emesis.  Indicated PACU care given.    Forest Dodd MD    There were no known notable events for this encounter.

## 2024-09-24 NOTE — SIGNIFICANT EVENT
Medicine Sign Off Note    Preoperative evaluation completed per yesterdays consult note. Patient underwent VPS placement this morning. Patient discussed with Dr. Bryant. Medicine team will sign off.    Neo Cardoso MD  PGY-1

## 2024-09-24 NOTE — PROGRESS NOTES
"Jonathan Ayala is a 48 y.o. female on day 7 of admission presenting with Vision changes.      Subjective   Patient had VPS this AM with neurosurgery without complication. She feels well, denies any vision changes and endorses stable blurriness in the right eye.       Objective     Last Recorded Vitals  Blood pressure 119/75, pulse 102, temperature 36.3 °C (97.3 °F), temperature source Temporal, resp. rate 18, height 1.575 m (5' 2\"), weight 118 kg (261 lb), SpO2 93%.    Physical Exam  Base Eye Exam       Visual Acuity (Snellen - Linear)         Right Left    Near sc 20/20-2 20/100 p 20/70-2              Tonometry (Tonopen, 2:09 PM)         Right Left    Pressure 17 18              Pupils         Dark Light Shape React APD    Right 5 3 Round Brisk None    Left 5 3 Round Brisk +rAPD              Visual Fields         Left Right                                Extraocular Movement         Right Left     Full, Ortho Full, Ortho              Neuro/Psych       Oriented x3: Yes                  Additional Tests       Color         Right Left    Ishihara 14/14 1/14                        Assessment/Plan   Assessment & Plan  Vision changes    IIH (idiopathic intracranial hypertension)    Type 2 diabetes mellitus with hyperglycemia, with long-term current use of insulin    Difficult intubation    This 48 year-old woman with a history of left non-arteritic anterior ischemic optic neuropathy,  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     Seen in clinic with Dr. Mederos on 9/16/24 with new grade 4 right optic disc edema. Differential diagnosis includes sequential non-arteritic anterior ischemic optic neuropathy, but also optic neuritis, both typical and atypical forms, as well as intracranial pressure (ICP) elevation " given her history of idiopathic intracranial hypertension, neuro-retinitis. Testing so far without evidence of enhancement of inflammatory lesions, no VST suggesting against inflammatory etiology, however laboratory studies for atypical optic neuritis still pending. Now s/p VPS with NSGY 9/24.      Update 9/24  - status post (s/p) VPS this AM with neurosurgery  - Entrance testing stable      #Unilateral optic disc edema, right eye  - MRI Brain and orbits and MRV without enhancement of the optic nerve, demyelinating lesions, or VST  - Chest Xray without infiltrate or hilar adenopathy  - Laboratory workup so far unremarkable.  - Lumbar puncture 9/18 with elevated opening pressure to 52 mmHg, elevated protein, with borderline WBC of 6 (no cell type predominant)  - CT chest w IV con negative for sarcoidosis or intrathoracic pathology   - s/p IV solumedrol 1g q24h (9/17/24 -9/19 )   - Continue furosemide 40mg BID for elevated intracranial pressure (ICP).  - Allergy consulted for desensitization to diamox, deferring in favor of VPS for now  - status post (s/p) VPS (9/14/24)      #Ocular hypertension, both eyes - resolved  - Possibly related to systemic steroid therapy  - Discontinue brimonidine OU     Ophthalmology to continue to follow while inpatient     Thank you for the consult. Note not final until attending signature. Please contact the Ophthalmology service with further questions or concerns.     Pager: [phone]     Rob Estrada MD

## 2024-09-25 ENCOUNTER — SPECIALTY PHARMACY (OUTPATIENT)
Dept: PHARMACY | Facility: CLINIC | Age: 49
End: 2024-09-25

## 2024-09-25 VITALS
SYSTOLIC BLOOD PRESSURE: 126 MMHG | TEMPERATURE: 96.8 F | RESPIRATION RATE: 20 BRPM | HEART RATE: 100 BPM | OXYGEN SATURATION: 95 % | BODY MASS INDEX: 48.03 KG/M2 | WEIGHT: 261 LBS | HEIGHT: 62 IN | DIASTOLIC BLOOD PRESSURE: 80 MMHG

## 2024-09-25 LAB
ALBUMIN SERPL BCP-MCNC: 3.7 G/DL (ref 3.4–5)
ANION GAP SERPL CALC-SCNC: 11 MMOL/L (ref 10–20)
BACTERIA CSF CULT: NORMAL
BASOPHILS # BLD AUTO: 0.01 X10*3/UL (ref 0–0.1)
BASOPHILS NFR BLD AUTO: 0.2 %
BUN SERPL-MCNC: 13 MG/DL (ref 6–23)
CALCIUM SERPL-MCNC: 8.8 MG/DL (ref 8.6–10.6)
CHLORIDE SERPL-SCNC: 101 MMOL/L (ref 98–107)
CO2 SERPL-SCNC: 31 MMOL/L (ref 21–32)
CREAT SERPL-MCNC: 0.79 MG/DL (ref 0.5–1.05)
EGFRCR SERPLBLD CKD-EPI 2021: >90 ML/MIN/1.73M*2
EOSINOPHIL # BLD AUTO: 0.02 X10*3/UL (ref 0–0.7)
EOSINOPHIL NFR BLD AUTO: 0.3 %
ERYTHROCYTE [DISTWIDTH] IN BLOOD BY AUTOMATED COUNT: 14.6 % (ref 11.5–14.5)
GLUCOSE BLD MANUAL STRIP-MCNC: 132 MG/DL (ref 74–99)
GLUCOSE BLD MANUAL STRIP-MCNC: 151 MG/DL (ref 74–99)
GLUCOSE BLD MANUAL STRIP-MCNC: 231 MG/DL (ref 74–99)
GLUCOSE SERPL-MCNC: 161 MG/DL (ref 74–99)
GRAM STN SPEC: NORMAL
GRAM STN SPEC: NORMAL
HCT VFR BLD AUTO: 38.8 % (ref 36–46)
HGB BLD-MCNC: 12 G/DL (ref 12–16)
IMM GRANULOCYTES # BLD AUTO: 0.03 X10*3/UL (ref 0–0.7)
IMM GRANULOCYTES NFR BLD AUTO: 0.5 % (ref 0–0.9)
LYMPHOCYTES # BLD AUTO: 1.58 X10*3/UL (ref 1.2–4.8)
LYMPHOCYTES NFR BLD AUTO: 27.3 %
MAGNESIUM SERPL-MCNC: 2.16 MG/DL (ref 1.6–2.4)
MCH RBC QN AUTO: 28.4 PG (ref 26–34)
MCHC RBC AUTO-ENTMCNC: 30.9 G/DL (ref 32–36)
MCV RBC AUTO: 92 FL (ref 80–100)
MONOCYTES # BLD AUTO: 0.43 X10*3/UL (ref 0.1–1)
MONOCYTES NFR BLD AUTO: 7.4 %
NEUTROPHILS # BLD AUTO: 3.72 X10*3/UL (ref 1.2–7.7)
NEUTROPHILS NFR BLD AUTO: 64.3 %
NRBC BLD-RTO: 0 /100 WBCS (ref 0–0)
PHOSPHATE SERPL-MCNC: 2.1 MG/DL (ref 2.5–4.9)
PLATELET # BLD AUTO: 258 X10*3/UL (ref 150–450)
POTASSIUM SERPL-SCNC: 4.2 MMOL/L (ref 3.5–5.3)
RBC # BLD AUTO: 4.22 X10*6/UL (ref 4–5.2)
SODIUM SERPL-SCNC: 139 MMOL/L (ref 136–145)
TRYPTASE SERPL-MCNC: 4.5 UG/L
WBC # BLD AUTO: 5.8 X10*3/UL (ref 4.4–11.3)

## 2024-09-25 PROCEDURE — 99239 HOSP IP/OBS DSCHRG MGMT >30: CPT

## 2024-09-25 PROCEDURE — 2500000001 HC RX 250 WO HCPCS SELF ADMINISTERED DRUGS (ALT 637 FOR MEDICARE OP)

## 2024-09-25 PROCEDURE — 2500000002 HC RX 250 W HCPCS SELF ADMINISTERED DRUGS (ALT 637 FOR MEDICARE OP, ALT 636 FOR OP/ED)

## 2024-09-25 PROCEDURE — 99232 SBSQ HOSP IP/OBS MODERATE 35: CPT

## 2024-09-25 PROCEDURE — 83735 ASSAY OF MAGNESIUM: CPT

## 2024-09-25 PROCEDURE — 2500000004 HC RX 250 GENERAL PHARMACY W/ HCPCS (ALT 636 FOR OP/ED)

## 2024-09-25 PROCEDURE — 80069 RENAL FUNCTION PANEL: CPT

## 2024-09-25 PROCEDURE — RXMED WILLOW AMBULATORY MEDICATION CHARGE

## 2024-09-25 PROCEDURE — 85025 COMPLETE CBC W/AUTO DIFF WBC: CPT

## 2024-09-25 PROCEDURE — 82947 ASSAY GLUCOSE BLOOD QUANT: CPT

## 2024-09-25 RX ORDER — FLUTICASONE FUROATE AND VILANTEROL 100; 25 UG/1; UG/1
1 POWDER RESPIRATORY (INHALATION) DAILY
Status: DISCONTINUED | OUTPATIENT
Start: 2024-09-25 | End: 2024-09-25 | Stop reason: HOSPADM

## 2024-09-25 RX ORDER — HYDROCORTISONE 10 MG/1
10 TABLET ORAL 2 TIMES DAILY
Qty: 300 TABLET | Refills: 0 | Status: SHIPPED | OUTPATIENT
Start: 2024-09-25

## 2024-09-25 RX ORDER — MIDAZOLAM 5 MG/.1ML
5 SPRAY NASAL DAILY PRN
Qty: 1 EACH | Refills: 0 | Status: CANCELLED | OUTPATIENT
Start: 2024-09-25

## 2024-09-25 RX ORDER — HEPARIN 100 UNIT/ML
5 SYRINGE INTRAVENOUS AS NEEDED
Status: DISCONTINUED | OUTPATIENT
Start: 2024-09-25 | End: 2024-09-25 | Stop reason: HOSPADM

## 2024-09-25 ASSESSMENT — COGNITIVE AND FUNCTIONAL STATUS - GENERAL
DAILY ACTIVITIY SCORE: 24
MOBILITY SCORE: 24

## 2024-09-25 ASSESSMENT — PAIN - FUNCTIONAL ASSESSMENT: PAIN_FUNCTIONAL_ASSESSMENT: 0-10

## 2024-09-25 ASSESSMENT — PAIN SCALES - GENERAL
PAINLEVEL_OUTOF10: 3
PAINLEVEL_OUTOF10: 0 - NO PAIN
PAINLEVEL_OUTOF10: 0 - NO PAIN

## 2024-09-25 NOTE — DISCHARGE SUMMARY
Discharge Diagnosis  Vision changes    Issues Requiring Follow-Up  Neurosurgery: post  shunt care/follow up  Endocrinology: follow up adrenal insufficiency, hydrocortisone dosing, and diabetes management  Ophthalmology: follow up improvement of vision    Test Results Pending At Discharge  Pending Labs       Order Current Status    Encephalopathy-Autoimmune Evaluation,CSF In process    Fungal Culture/Smear Preliminary result          Hospital Course  Jonathan Ayala is a 48 y.o. 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 . In terms of etiology for unilateral optic disc edema, atypical optic neuritis remains on the differential vs neuro-retinitis. Lumbar puncture mostly noticeable for elevated protein and opening pressure of 52, but no pleocytosis with predominant cell type. Headache was greatly improved after LP and vision continues to improve. This makes the differential of IIH more enticing. VPS placement with NSGY on 9/24. No complications from this procedure.    Hospital course complicated by episode of L sided paresthesias and weakness iso headache on 9/21. CT scans and MRI negative for stroke. Headache treated with IVF, dexamethasone, and zofran with improvement.    Patient ambulating, eating, drinking and toileting well. Discharged home on 9/25 with follow ups scheduled.    Medications on Discharge:  Hydrocortisone:  9/26: Take 20mg (2 tablets) in the morning, take 10mg (1 tablet) at 4PM  9/27: Take 20mg (2 tablets) in the morning, take 10mg (1 tablet) at 4PM  9/28 until your Endocrine appointment (10/10): Take 10mg (1 tablet) in the morning, take 5mg (0.5 tablet) at  4PM    Follow Ups:  Neurosurgery: post  shunt care/follow up  Endocrinology: follow up adrenal insufficiency, hydrocortisone dosing, and diabetes management  Ophthalmology: follow up improvement of vision    Pertinent Physical Exam At Time of Discharge  Physical Exam  Physical Exam  HENT:      Head: Normocephalic.      Comments: S/p shunt placement with no bleeding/fluid or redness.  Abdomen:    Well healing incisions from  shunt placement. No redness or discharge.    Neurological Exam  Mental Status  Alert. Oriented to person, place and time. Recent and remote memory are intact. Speech is normal. Attention and concentration are normal.     Cranial Nerves  CN II: Right visual acuity: Finger movement. Left visual acuity: Finger movement. Right inferior nasal field deficit. Left normal visual field. Visual field testing improving Able to count fingers in L eye in all visual fields  Able to count fingers in R superior nasal and temporal fields, repots seeing gray and able to see movement in lower fields - improved from day prior.  CN III, IV, VI: Extraocular movements intact bilaterally. Normal lids and orbits bilaterally.   Right pupil: 3 mm.   Left pupil: 3 mm. Pain endorsed with rightward gaze in right eye..  CN V: Facial sensation is normal.  CN VII: Full and symmetric facial movement.  CN VIII: Hearing is normal.  CN IX, X: Palate elevates symmetrically  CN XI: Shoulder shrug strength is normal.  CN XII: Tongue midline without atrophy or fasciculations.  Right eye with blurry vision in the nasal inferior. Left eye with baseline decreased peripheral visual acuity.     Motor  Normal muscle bulk throughout. No fasciculations present. Normal muscle tone. Strength is 5/5 throughout all four extremities.     Sensory  Intact to soft touch in b/l UE and LE. Pinprick is normal in UE and LE. Diminished cold sensation in distal lower extremities. Proprioception is normal in upper and lower extremities.     "  Coordination  Right: Finger-to-nose normal. Rapid alternating movement normal. Heel-to-shin normal.  Romberg negative.     Gait  Normal gait with appropriate arm swing, stride length. Normal heel and toe walking.    Home Medications     Medication List      CHANGE how you take these medications     hydrocortisone 10 mg tablet; Commonly known as: Cortef; Take 1 tablet   (10 mg) by mouth 2 times a day. 9/26: Take 20mg (2 tablets) in the   morning, take 10mg (1 tablet) at 4PM 9/27: Take 20mg (2 tablets) in the   morning, take 10mg (1 tablet) at 4PM 9/28 until your Endocrine appointment   (10/10): Take 10mg (1 tablet) in the morning, take 5mg (0.5 tablet) at   4PM; What changed: See the new instructions.   insulin lispro 100 unit/mL injection; Commonly known as: HumaLOG KwikPen   Insulin; Use as directed to give up to 100 units a day; What changed:   additional instructions     CONTINUE taking these medications     acetaminophen 325 mg tablet; Commonly known as: Tylenol   Advair -21 mcg/actuation inhaler; Generic drug: fluticasone   propion-salmeteroL   amitriptyline 100 mg tablet; Commonly known as: Elavil; Take 1 tablet   (100 mg) by mouth once daily at bedtime.   amLODIPine 5 mg tablet; Commonly known as: Norvasc   azelastine 137 mcg (0.1 %) nasal spray; Commonly known as: Astelin   Baqsimi 3 mg/actuation spray,non-aerosol; Generic drug: glucagon; USE   ONE SPRAY IN THE NOSE AS NEEDED FOR LOW BLOOD SUGAR  MAY REPEAT AFTER 15   MINUTES USING A NEW DEVICE IF NO RESPONSE   BD Ultra-Fine Mini Pen Needle 31 gauge x 3/16\" needle; Generic drug: pen   needle, diabetic   carboxymethylcellulose 1 % ophthalmic solution dropperette; Commonly   known as: Refresh Celluvisc   cholecalciferol 5,000 Units tablet; Commonly known as: Vitamin D-3   Dexcom G6 Sensor device; Generic drug: blood-glucose sensor; Use as   directed. Change sensors every 10 days   Dexcom G6 Transmitter device; Generic drug: blood-glucose transmitter "   device; Use as instructed. Change every 90 days   * diclofenac 50 mg EC tablet; Commonly known as: Voltaren; Take 1 tablet   (50 mg) by mouth 3 times a day as needed (migraine). 30 day supply   diphenhydrAMINE 12.5 mg/5 mL liquid; Commonly known as: BENADryl   divalproex 500 mg 24 hr tablet; Commonly known as: Depakote ER   EPINEPHrine 0.3 mg/0.3 mL injection syringe; Commonly known as: Epipen;   INJECT INTRAMUSCULARLY ONCE AS NEEDED FOR ANAPHYLAXIS   ezetimibe 10 mg tablet; Commonly known as: Zetia; Take 1 tablet (10 mg)   by mouth once daily.   fluticasone 50 mcg/actuation nasal spray; Commonly known as: Flonase;   USE 1 SPRAY INTO EACH NOSTRIL ONCE DAILY.   folic acid 1 mg tablet; Commonly known as: Folvite; Take 1 tablet (1 mg)   by mouth once daily.   furosemide 20 mg tablet; Commonly known as: Lasix; Take 1 tablet (20 mg)   by mouth 2 times a day.   immune globulin (human) infusion; Commonly known as: Gammagard   insulin glargine 300 unit/mL (3 mL) injection; Commonly known as: Toujeo   Max Solostar- 2 unit dial; Inject 50 units under skin once daily   ipratropium-albuteroL 0.5-2.5 mg/3 mL nebulizer solution; Commonly known   as: Duo-Neb   KlonoPIN 0.5 mg tablet; Generic drug: clonazePAM   * lacosamide 200 mg tablet tablet; Commonly known as: Vimpat   * lacosamide 50 mg tablet; Commonly known as: Vimpat   levETIRAcetam 750 mg tablet; Commonly known as: Keppra   levothyroxine 50 mcg tablet; Commonly known as: Synthroid, Levoxyl; Take   1.5 tablets (75 mcg) by mouth once daily in the morning. Take before   meals.   miconazole 2 % powder; Commonly known as: Micotin   moisturizing mouth solution; Commonly known as: Biotene Oral Dry Mouth   montelukast 10 mg tablet; Commonly known as: Singulair; Take 1 tablet   (10 mg) by mouth once daily at bedtime.   Motegrity 2 mg tablet; Generic drug: prucalopride   naratriptan 1 mg tablet; Commonly known as: Amerge; Take 1 tablet (1 mg)   by mouth 1 time if needed for  migraine (may repeat x1). May repeat in 4   hours if unresolved. Do not exceed 5 mg in 24 hours.   nasal spray Nayzilam 5 mg/spray (0.1 mL) spray,non-aerosol; Generic   drug: midazolam   Omnipod 5 G6 Pods (Gen 5) cartridge; Generic drug: insulin pump   cart,automated,BT; Inject 1 Device under the skin every other day.   ondansetron ODT 4 mg disintegrating tablet; Commonly known as:   Zofran-ODT   OneTouch Delica Plus Lancet 33 gauge misc; Generic drug: lancets   OneTouch Verio test strips strip; Generic drug: blood sugar diagnostic   pantoprazole 40 mg EC tablet; Commonly known as: ProtoNix; Take 1 tablet   (40 mg) by mouth once daily. Do not crush, chew, or split.   promethazine 12.5 mg tablet; Commonly known as: Phenergan   propranolol LA 60 mg 24 hr capsule; Commonly known as: Inderal LA; Take   1 capsule (60 mg) by mouth once daily. Do not crush, chew, or split.   Reyvow 100 mg tablet; Generic drug: lasmiditan; Take 100 mg by mouth if   needed (migraine). Do not drive in 8 hours of taking this medicine.   Maximum one dose per 24 hours.   rOPINIRole 0.5 mg tablet; Commonly known as: Requip   senna 8.6 mg tablet; Generic drug: sennosides   tiZANidine 2 mg tablet; Commonly known as: Zanaflex; Take 1 tablet (2   mg) by mouth every 8 hours if needed for muscle spasms.   ubrogepant 100 mg tablet tablet; Commonly known as: Ubrelvy; Take 1   tablet (100 mg) by mouth if needed (migraine). May repeat in 2 hours for   max of 200mg per 24 hours.   Viactiv 650 mg-12.5 mcg-40 mcg chewable tablet; Generic drug:   calcium-vitamin D3-vitamin K   ZINC ORAL  * This list has 3 medication(s) that are the same as other medications   prescribed for you. Read the directions carefully, and ask your doctor or   other care provider to review them with you.     ASK your doctor about these medications     * diclofenac sodium 1 % gel; Commonly known as: Voltaren Arthritis Pain;   Apply 4.5 inches (4 g) topically 3 times a day as needed (knee  pain).   Farxiga 5 mg; Generic drug: dapagliflozin propanediol; Take 1 tablet (5   mg) by mouth once daily.   rimegepant 75 mg tablet,disintegrating; Commonly known as: Nurtec ODT;   Take 1 tablet (75 mg) by mouth if needed (migraine).   scopolamine 1 mg over 3 days patch 3 day; Commonly known as:   Transderm-Scop; Place 1 patch over 72 hours on the skin every 3rd day.   SUMAtriptan 25 mg tablet; Commonly known as: Imitrex; Take 1 tablet (25   mg) by mouth 1 time if needed for migraine. May repeat dose once in 2   hours if no relief.  Do not exceed 2 doses in 24 hours.  * This list has 1 medication(s) that are the same as other medications   prescribed for you. Read the directions carefully, and ask your doctor or   other care provider to review them with you.       Outpatient Follow-Up  Future Appointments   Date Time Provider Department Center   10/1/2024  9:00 AM KUMAR PAIN PROCEDURE RM1 BEAPNP [facility]   10/3/2024  2:30 PM Rob Mederos MD PhD [id number] [facility]   10/4/2024  3:15 PM Rob Mederos MD PhD [id number] [facility]   10/9/2024 11:00 AM NEUROSURGERY [facility] NURSE [id number] [facility]   10/10/2024  2:30 PM Jazz Mccall APRN-CNP [id number] [facility]   10/15/2024  9:00 AM DIANE Ni, CCC-A [id number] [facility]   10/15/2024  9:40 AM Jaimie Waldron APRN-CNP [id number] [facility]   10/17/2024  3:30 PM Evelyn Bai MD [id number] [place]   10/22/2024  1:00 PM [facility] [id number] ULTRASOUND [id number] [facility]   10/22/2024  1:45 PM [facility] [id number] CT [id number] [facility]   10/28/2024  2:45 PM Avelino Tafoya MD [id number] [facility]   4/3/2025 12:00 PM Marah Felix MD [id number] [facility]       Giacomo Olguin MD

## 2024-09-25 NOTE — PROGRESS NOTES
"Jonathan Ayala is a 48 y.o. female on day 8 of admission presenting with Vision changes.    Subjective   Patient was seen and examined today.  Had no major complaints, appetite is good, denies any nausea or vomiting.       Objective   Constitutional:       Appearance: Normal appearance. She is obese.   Eyes:      Extraocular Movements: Extraocular movements intact.      Conjunctiva/sclera: Conjunctivae normal.      Comments: VF exam not done      Rate and Rhythm: Normal rate and regular rhythm.   Pulmonary:      Effort: Pulmonary effort is normal.   Abdominal:      General: Abdomen is flat.   Musculoskeletal:      Right lower leg: No edema.      Left lower leg: No edema.   Skin:     General: Skin is warm and dry.   R VPS  Neurological:      Mental Status: She is alert and oriented to person, place, and time.   Psychiatric:         Mood and Affect: Mood normal    Last Recorded Vitals  Blood pressure 126/80, pulse 100, temperature 36 °C (96.8 °F), temperature source Temporal, resp. rate 20, height 1.575 m (5' 2\"), weight 118 kg (261 lb), SpO2 95%.  Intake/Output last 3 Shifts:  I/O last 3 completed shifts:  In: 1000 (8.4 mL/kg) [I.V.:1000 (8.4 mL/kg)]  Out: 1770 (15 mL/kg) [Urine:1750 (0.4 mL/kg/hr); Blood:20]  Weight: 118.4 kg     Relevant Results  Results from last 7 days   Lab Units 09/25/24  1632 09/25/24  1212 09/25/24  0911 09/25/24  0757 09/24/24  1953 09/24/24  1641 09/24/24  0406 09/24/24  0218 09/23/24  1257 09/23/24  0932 09/22/24  1617 09/22/24  1206 09/21/24  0817 09/21/24  0605   POCT GLUCOSE mg/dL 132* 231*  --  151* 306* 272*   < >  --    < >  --    < >  --    < >  --    GLUCOSE mg/dL  --   --  161*  --   --   --   --  184*  --  208*  --  316*  --  182*    < > = values in this interval not displayed.     Scheduled medications  prucalopride, 1 tablet, oral, Daily  acetaminophen, 650 mg, oral, Once  amitriptyline, 100 mg, oral, Nightly  clonazePAM, 0.5 mg, oral, BID  divalproex, 500 mg, oral, BID  [Held " by provider] enoxaparin, 40 mg, subcutaneous, Daily  ezetimibe, 10 mg, oral, Daily  fluticasone furoate-vilanteroL, 1 puff, inhalation, Daily  folic acid, 1 mg, oral, Daily  [Held by provider] furosemide, 40 mg, oral, BID  insulin glargine, 50 Units, subcutaneous, q AM  insulin lispro, 0-10 Units, subcutaneous, TID  insulin lispro, 18 Units, subcutaneous, TID  lacosamide, 250 mg, oral, BID  levETIRAcetam, 1,500 mg, oral, BID  levothyroxine, 75 mcg, oral, Daily before breakfast  montelukast, 10 mg, oral, Nightly  polyethylene glycol, 17 g, oral, Daily  propranolol, 20 mg, oral, TID  rOPINIRole, 0.5 mg, oral, q AM  rOPINIRole, 1 mg, oral, Nightly  sennosides, 1-2 tablet, oral, Nightly      Continuous medications     PRN medications  PRN medications: [DISCONTINUED] acetaminophen **OR** acetaminophen, dextrose, dextrose, dextrose, glucagon, glucagon, glucagon, heparin flush, ipratropium-albuteroL, ondansetron ODT **OR** ondansetron         Results for orders placed or performed during the hospital encounter of 09/17/24 (from the past 24 hour(s))   POCT GLUCOSE   Result Value Ref Range    POCT Glucose 272 (H) 74 - 99 mg/dL   POCT GLUCOSE   Result Value Ref Range    POCT Glucose 306 (H) 74 - 99 mg/dL   POCT GLUCOSE   Result Value Ref Range    POCT Glucose 151 (H) 74 - 99 mg/dL   CBC and Auto Differential   Result Value Ref Range    WBC 5.8 4.4 - 11.3 x10*3/uL    nRBC 0.0 0.0 - 0.0 /100 WBCs    RBC 4.22 4.00 - 5.20 x10*6/uL    Hemoglobin 12.0 12.0 - 16.0 g/dL    Hematocrit 38.8 36.0 - 46.0 %    MCV 92 80 - 100 fL    MCH 28.4 26.0 - 34.0 pg    MCHC 30.9 (L) 32.0 - 36.0 g/dL    RDW 14.6 (H) 11.5 - 14.5 %    Platelets 258 150 - 450 x10*3/uL    Neutrophils % 64.3 40.0 - 80.0 %    Immature Granulocytes %, Automated 0.5 0.0 - 0.9 %    Lymphocytes % 27.3 13.0 - 44.0 %    Monocytes % 7.4 2.0 - 10.0 %    Eosinophils % 0.3 0.0 - 6.0 %    Basophils % 0.2 0.0 - 2.0 %    Neutrophils Absolute 3.72 1.20 - 7.70 x10*3/uL    Immature  Granulocytes Absolute, Automated 0.03 0.00 - 0.70 x10*3/uL    Lymphocytes Absolute 1.58 1.20 - 4.80 x10*3/uL    Monocytes Absolute 0.43 0.10 - 1.00 x10*3/uL    Eosinophils Absolute 0.02 0.00 - 0.70 x10*3/uL    Basophils Absolute 0.01 0.00 - 0.10 x10*3/uL   Magnesium   Result Value Ref Range    Magnesium 2.16 1.60 - 2.40 mg/dL   Renal function panel   Result Value Ref Range    Glucose 161 (H) 74 - 99 mg/dL    Sodium 139 136 - 145 mmol/L    Potassium 4.2 3.5 - 5.3 mmol/L    Chloride 101 98 - 107 mmol/L    Bicarbonate 31 21 - 32 mmol/L    Anion Gap 11 10 - 20 mmol/L    Urea Nitrogen 13 6 - 23 mg/dL    Creatinine 0.79 0.50 - 1.05 mg/dL    eGFR >90 >60 mL/min/1.73m*2    Calcium 8.8 8.6 - 10.6 mg/dL    Phosphorus 2.1 (L) 2.5 - 4.9 mg/dL    Albumin 3.7 3.4 - 5.0 g/dL   POCT GLUCOSE   Result Value Ref Range    POCT Glucose 231 (H) 74 - 99 mg/dL   POCT GLUCOSE   Result Value Ref Range    POCT Glucose 132 (H) 74 - 99 mg/dL     *Note: Due to a large number of results and/or encounters for the requested time period, some results have not been displayed. A complete set of results can be found in Results Review.      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 Endocrine was consulted on 9/20/24  for management of Inpatient Hyperglycemia.       Diabetes history:  -Diagnosed 2002 -2003  -Follows with Dr. Felix and her group OP  ([facility] on 9/16 -[facility].Upcoming appointment on 10/10/23 - [facility])  -Diabetes Home Regimen:   Toujeo  50 units AM   Carb Ratio - 1:2 TID (Undercarb estimating)  Sliding Scale -  1 unit for every 30 > 200 and  2 units for every  30 >200 at bedtime.   -Diabetes Complications:   Gastroparesis/ Peripheral Neuropathy   -Monitors blood sugar via Dexcom G7   -Previously on Tandem - short period of time- reportedly hypoglycemia.   With Omnipod recently - adhesive rxn.   -Previous treatment:   Ozempic and Metformin (Had GI side effects)    Glucocorticoid during hospital stay:  3 Doses of Methylprednisone 1 g given at 4 pm daily -Last dose - 9/18   Followed by 1 dose of Dexamethasone 4 mg on 9/21  Resumed on home dose HC 10 mg daily on 9/22 - 9/23 [per orders patient is supposed to be on 10 twice daily, only taking 10 mg in the morning].  VPS placement- Received Dexa 4 mg on 9/24 at  am intra-op  9/25: HC 20-10-5    Type 2 diabetes: Poorly controlled in setting of acute illness, glucocorticoid use, and stress  Recommendations:  ADOD 9/25  -Resume home Toujeo 50 units subcutaneously daily in a.m.  -Resume home sliding scale insulin along with insulin to carb ratio 1:2 as well as sliding scale for snacks at bedtime seen home regimen].  -Hydrocortisone 20 mg at 8 AM and 10 mg at 4 PM for 3 days starting 9/26  Tapering down to the hydrocortisone 10 mg at 8 AM and 5 mg at 4 PM thereafter until patient follows up with endocrinology on 10/10/2024.    Plan was communicated to the primary team via secure messaging    Marcela aVzquez MD  Endocrinology, PGY 5  Pager [phone] or secure chat     Case discussed with Dr. Conklin

## 2024-09-25 NOTE — CARE PLAN
The patient's goals for the shift include practice adls     The clinical goals for the shift include Pt to remain safe and stable through shift    Over the shift, the patient did not make progress toward the following goals. Barriers to progression include mobilization barriers . Recommendations to address these barriers include practice ADL's and out of bed to bathroom.

## 2024-09-25 NOTE — ASSESSMENT & PLAN NOTE
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 Endocrine was consulted on 9/20/24  for management of Inpatient Hyperglycemia.       Diabetes history:  -Diagnosed 2002 -2003  -Follows with Dr. Felxi and her group OP (Last seen on 9/16 -[facility].Upcoming appointment on 10/10/23 - [facility])  -Diabetes Home Regimen:   Toujeo  50 units AM   Carb Ratio - 1:2 TID (Undercarb estimating)  Sliding Scale -  1 unit for every 30 > 200 and  2 units for every  30 >200 at bedtime.   -Diabetes Complications:   Gastroparesis/ Peripheral Neuropathy   -Monitors blood sugar via Dexcom G7   -Previously on Tandem - short period of time- reportedly hypoglycemia.   With Omnipod recently - adhesive rxn.   -Previous treatment:   Ozempic and Metformin (Had GI side effects)    Glucocorticoid during hospital stay:  3 Doses of Methylprednisone 1 g given at 4 pm daily -Last dose - 9/18   Followed by 1 dose of Dexamethasone 4 mg on 9/21  Resumed on home dose HC 10 mg daily on 9/22 - 9/23 [per orders patient is supposed to be on 10 twice daily, only taking 10 mg in the morning].  VPS placement- Received Dexa 4 mg on 9/24 at  am intra-op  9/25: HC 20-10-5    Type 2 diabetes: Poorly controlled in setting of acute illness, glucocorticoid use, and stress  Recommendations:  ADOD 9/25  -Resume home Toujeo 50 units subcutaneously daily in a.m.  -Resume home sliding scale insulin along with insulin to carb ratio 1:2 as well as sliding scale for snacks at bedtime seen home  regimen].  -Hydrocortisone 20 mg at 8 AM and 10 mg at 4 PM for 3 days starting 9/26  Tapering down to the hydrocortisone 10 mg at 8 AM and 5 mg at 4 PM thereafter until patient follows up with endocrinology on 10/10/2024.    Plan was communicated to the primary team via secure messaging    Marcela Vazquez MD  Endocrinology, PGY 5  Pager [phone] or secure chat     Case discussed with Dr. Conklin

## 2024-09-25 NOTE — PROGRESS NOTES
"Jonathan Ayala is a 48 y.o. female on day 8 of admission presenting with Vision changes.      Subjective   Today she reports some head pain related to her recent VPS. She describes a black line in the center of the vision in the right eye that is now improving slowly and appears grey instead of black.       Objective     Last Recorded Vitals  Blood pressure 121/82, pulse 95, temperature 36 °C (96.8 °F), temperature source Temporal, resp. rate 18, height 1.575 m (5' 2\"), weight 118 kg (261 lb), SpO2 95%.    Physical Exam  Base Eye Exam       Visual Acuity (Snellen - Linear)         Right Left    Near sc 20/25-2 ph 20/20-1 20/200 ph 20/70+1              Tonometry (Tonopen, 9:06 AM)         Right Left    Pressure 17 18              Pupils         Dark Light Shape React APD    Right 6 5 Round Brisk None    Left 6 5 Round Brisk +RAPD              Visual Fields         Left Right     Full                                 Extraocular Movement         Right Left     Full, Ortho Full, Ortho              Neuro/Psych       Oriented x3: Yes                  Additional Tests       Color         Right Left    Ishihara 14/14 0/14                        Assessment/Plan   Assessment & Plan  Vision changes    Type 2 diabetes mellitus with hyperglycemia, with long-term current use of insulin    Difficult intubation    This 48 year-old woman with a history of left non-arteritic anterior ischemic optic neuropathy,  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     Seen in clinic with Dr. Mederos on 9/16/24 with new grade 4 right optic disc edema. Differential diagnosis includes sequential non-arteritic anterior ischemic optic neuropathy, but also optic neuritis, both typical and atypical forms, as well as intracranial pressure " (ICP) elevation given her history of idiopathic intracranial hypertension, neuro-retinitis. Testing so far without evidence of enhancement of inflammatory lesions, no VST suggesting against inflammatory etiology, however laboratory studies for atypical optic neuritis still pending. Now s/p VPS with NSGY 9/24.      Update 9/25  - Entrance testing stable. Has follow up scheduled with Dr. Mederos (neuro-ophthalmology) on 10/03/24     #Unilateral optic disc edema, right eye  - MRI Brain and orbits and MRV without enhancement of the optic nerve, demyelinating lesions, or VST  - Chest Xray without infiltrate or hilar adenopathy  - Laboratory workup so far unremarkable.  - Lumbar puncture 9/18 with elevated opening pressure to 52 mmHg, elevated protein, with borderline WBC of 6 (no cell type predominant)  - CT chest w IV con negative for sarcoidosis or intrathoracic pathology   - s/p IV solumedrol 1g q24h (9/17/24 -9/19 )   - Continue furosemide 40mg BID for elevated intracranial pressure (ICP).  - Allergy consulted for desensitization to diamox, deferring in favor of VPS for now  - status post (s/p) VPS (9/14/24)      #Ocular hypertension, both eyes - resolved  - Possibly related to systemic steroid therapy  - Discontinue brimonidine OU     Ophthalmology to continue to follow while inpatient     Thank you for the consult. Note not final until attending signature. Please contact the Ophthalmology service with further questions or concerns.     Pager: [phone]     Rob Estrada MD

## 2024-09-25 NOTE — SIGNIFICANT EVENT
Patient with s/p R frontal VPS, doing well postoperatively. No acute neurosurgical intervention or additional neuroimaging needed at this time. Prophylactic anticoagulation allowed on post-op day 2. Patient needs follow up in 2 weeks for wound check. If patient is still house at this time please page us at [phone] to review. Ok to work with [facility]OT. Thank you for allowing us to participate in the care of this patient. Will sign off at this time. Please page [phone] with any questions or concerns.    Jesus Lambert MD  PGY-3 Neurosurgery  6:29 AM

## 2024-09-25 NOTE — CARE PLAN
Transitional Care Coordinator Note: Patient discussed with medical team, per medical team patient is not medically ready. Discharge dispo: [facility]. ADOD 9/25/1979  Tad Nair RN  Transitional Care Coordinator

## 2024-09-26 ENCOUNTER — PATIENT OUTREACH (OUTPATIENT)
Dept: PRIMARY CARE | Facility: CLINIC | Age: 49
End: 2024-09-26
Payer: MEDICAID

## 2024-09-26 ENCOUNTER — PHARMACY VISIT (OUTPATIENT)
Dept: PHARMACY | Facility: CLINIC | Age: 49
End: 2024-09-26
Payer: MEDICAID

## 2024-09-26 NOTE — PROGRESS NOTES
Referral placed for Pharmacy.     Pt. States that she doesn't take as much tylenol due to rebound headaches with migraine history. She would like to know what she should take to help with this pain. Upon hospital discharge they advised tylenol.     Discharge Facility: [facility]   Discharge Diagnosis: Vision changes   Admission Date: 9-17-24  Discharge Date:  9-25-24    PCP Appointment Date: 10-2-24  Specialist Appointment Date: 10-3-24 [facility]   Hospital Encounter and Summary Linked: Yes  See discharge assessment below for further details  Medications  Medications reviewed with patient/caregiver?: Yes (9/26/2024 11:14 AM)  Is the patient having any side effects they believe may be caused by any medication additions or changes?: No (9/26/2024 11:14 AM)  Does the patient have all medications ordered at discharge?: Yes (9/26/2024 11:14 AM)  Care Management Interventions: Provided patient education (9/26/2024 11:14 AM)  Prescription Comments: CHANGE how you take: hydrocortisone (Cortef)  ASK how to take: diclofenac sodium 1 % gel (Voltaren Arthritis Pain) Farxiga 5 mg (dapagliflozin propanediol) rimegepant 75 mg tablet,disintegrating (Nurtec ODT) scopolamine 1 mg over 3 days patch 3 day (TransdermScop) SUMAtriptan 25 mg tablet (Imitrex) (9/26/2024 11:14 AM)  Is the patient taking all medications as directed (includes completed medication regime)?: Yes (9/26/2024 11:14 AM)  Care Management Interventions: Provided patient education; Notified pharmacy for assistance (9/26/2024 11:14 AM)  Medication Comments: Pharm referral placed (9/26/2024 11:14 AM)    Appointments  Does the patient have a primary care provider?: Yes (9/26/2024 11:14 AM)  Care Management Interventions: Verified appointment date/time/provider (9/26/2024 11:14 AM)  Has the patient kept scheduled appointments due by today?: Yes (9/26/2024 11:14 AM)  Care Management Interventions: Advised patient to keep appointment (9/26/2024 11:14 AM)    Self  Management  What is the home health agency?: Home care waiver- Home care aid was in to see pt. today (9/26/2024 11:14 AM)  Has home health visited the patient within 72 hours of discharge?: Yes (9/26/2024 11:14 AM)    Patient Teaching  Does the patient have access to their discharge instructions?: Yes (9/26/2024 11:14 AM)  Care Management Interventions: Reviewed instructions with patient (9/26/2024 11:14 AM)  What is the patient's perception of their health status since discharge?: Improving (9/26/2024 11:14 AM)  Is the patient/caregiver able to teach back the hierarchy of who to call/visit for symptoms/problems? PCP, Specialist, Home Health nurse, Urgent Care, ED, 911: Yes (9/26/2024 11:14 AM)      Britney Phipps LPN     Libtayo Counseling- I discussed with the patient the risks of Libtayo including but not limited to nausea, vomiting, diarrhea, and bone or muscle pain.  The patient verbalized understanding of the proper use and possible adverse effects of Libtayo.  All of the patient's questions and concerns were addressed.

## 2024-09-27 ENCOUNTER — TELEPHONE (OUTPATIENT)
Dept: ENDOCRINOLOGY | Facility: CLINIC | Age: 49
End: 2024-09-27
Payer: MEDICAID

## 2024-09-27 NOTE — TELEPHONE ENCOUNTER
Received [employer] supply form for CGM  Form signed by provider and faxed to number provided [phone] with fax confirmation  Form scanned to media

## 2024-09-30 ENCOUNTER — TELEPHONE (OUTPATIENT)
Dept: NEUROSURGERY | Facility: HOSPITAL | Age: 49
End: 2024-09-30
Payer: MEDICAID

## 2024-09-30 LAB
FUNGUS SPEC CULT: NORMAL
FUNGUS SPEC FUNGUS STN: NORMAL

## 2024-09-30 NOTE — TELEPHONE ENCOUNTER
Called pt.'s cell and then called home phone and talked to her , He said it was leaking clear fluid the other day and doesn't know if it is leaking today . I told him to watch it if it is not leaking today and if it is leaking she should come to the main [place] ER. He said ok.

## 2024-10-01 ENCOUNTER — APPOINTMENT (OUTPATIENT)
Facility: HOSPITAL | Age: 49
End: 2024-10-01
Payer: MEDICAID

## 2024-10-01 LAB
AMPAR2 IGG CSF QL CBA IFA: NEGATIVE
AMPHIPHYSIN AB CSF QL IF: NEGATIVE
ANNOTATION COMMENT IMP: NORMAL
CASPR2 IGG CSF QL CBA IFA: NEGATIVE
CV2 AB CSF QL IF: NEGATIVE
DPPX IGG CSF QL CBA IFA: NEGATIVE
GABABR IGG CSF QL CBA IFA: NEGATIVE
GAD65 IGG+IGM CSF IA-SCNC: 0 NMOL/L
GFAP ALPHA IGG CSF QL IF: NEGATIVE
GLIAL NUC TYPE 1 AB CSF QL IF: NEGATIVE
HU1 AB CSF QL IF: NEGATIVE
HU2 AB CSF QL IF: NEGATIVE
HU3 AB CSF QL IF: NEGATIVE
IGLON5 IGG CSF QL CBA IFA: NEGATIVE
IMMUNOLOGIST REVIEW: NORMAL
LGI1 IGG CSF QL CBA IFA: NEGATIVE
MGLUR1 IGG CSF QL IF: NEGATIVE
NEUROCHONDRIN AB CSF QL IF: NEGATIVE
NIF IGG CSF QL IF: NEGATIVE
NMDAR1 IGG CSF QL CBA IFA: NEGATIVE
PCA-1 AB CSF QL IF: NEGATIVE
PCA-2 AB CSF QL IF: NEGATIVE
PCA-TR AB CSF QL IF: NEGATIVE
PDE10A AB IFA, CSF: NEGATIVE
SEPTIN-7 IGG CSF QL IF: NEGATIVE
TRIM46 AB IFA, CSF: NEGATIVE

## 2024-10-02 ENCOUNTER — PATIENT OUTREACH (OUTPATIENT)
Dept: PHARMACY | Facility: HOSPITAL | Age: 49
End: 2024-10-02

## 2024-10-02 ENCOUNTER — APPOINTMENT (OUTPATIENT)
Dept: PRIMARY CARE | Facility: CLINIC | Age: 49
End: 2024-10-02
Payer: MEDICAID

## 2024-10-02 VITALS — BODY MASS INDEX: 45.64 KG/M2 | WEIGHT: 248 LBS | HEIGHT: 62 IN

## 2024-10-02 DIAGNOSIS — E11.65 TYPE 2 DIABETES MELLITUS WITH HYPERGLYCEMIA, WITH LONG-TERM CURRENT USE OF INSULIN: Primary | ICD-10-CM

## 2024-10-02 DIAGNOSIS — Z79.4 TYPE 2 DIABETES MELLITUS WITH HYPERGLYCEMIA, WITH LONG-TERM CURRENT USE OF INSULIN: Primary | ICD-10-CM

## 2024-10-02 PROCEDURE — 99496 TRANSJ CARE MGMT HIGH F2F 7D: CPT | Performed by: INTERNAL MEDICINE

## 2024-10-02 PROCEDURE — 1036F TOBACCO NON-USER: CPT | Performed by: INTERNAL MEDICINE

## 2024-10-02 PROCEDURE — 3008F BODY MASS INDEX DOCD: CPT | Performed by: INTERNAL MEDICINE

## 2024-10-02 PROCEDURE — 3049F LDL-C 100-129 MG/DL: CPT | Performed by: INTERNAL MEDICINE

## 2024-10-02 PROCEDURE — 3051F HG A1C>EQUAL 7.0%<8.0%: CPT | Performed by: INTERNAL MEDICINE

## 2024-10-02 RX ORDER — PANTOPRAZOLE SODIUM 40 MG/1
40 TABLET, DELAYED RELEASE ORAL
COMMUNITY

## 2024-10-02 RX ORDER — AMOXICILLIN AND CLAVULANATE POTASSIUM 875; 125 MG/1; MG/1
1 TABLET, FILM COATED ORAL
COMMUNITY
Start: 2024-09-29 | End: 2024-10-09

## 2024-10-02 RX ORDER — BLOOD-GLUCOSE,RECEIVER,CONT
EACH MISCELLANEOUS
COMMUNITY

## 2024-10-02 RX ORDER — POLYETHYLENE GLYCOL 3350 17 G/17G
17 POWDER, FOR SOLUTION ORAL DAILY PRN
COMMUNITY
Start: 2024-08-22

## 2024-10-02 SDOH — HEALTH STABILITY: MENTAL HEALTH: CAFFEINE CONSUMPTION: NEVER

## 2024-10-02 NOTE — PROGRESS NOTES
Subjective   Patient ID: Jonathan Ayala is a 48 y.o. female who presents for TCM.    HPI     Review of Systems    Objective   There were no vitals taken for this visit.    Physical Exam    Assessment/Plan

## 2024-10-02 NOTE — PROGRESS NOTES
Transitional Care Management: Pharmacy    Subjective   Jonathan Ayala is a 48 y.o. female who was referred to Clinical Pharmacy Team to complete TCM medication reconciliation prior to office visit with Isidoro Mensah DO on 10/2/24. A comprehensive medication review was completed with the patient. patient had all medications and/or an updated medication list in front of them during the telephone encounter.     Notable medication changes following discharge:  Admission Date: 9/17/24  Discharge Date: 9/25/24  Discharge Diagnosis: [facility]  Discharged From: [facility]    Medication Reconciliation  General Medication Management    Type of medication management: comprehensive medication review  Referred by: case management  Targeting Criteria Met: drug management program at-risk beneficiary  Is the Beneficiary in a Long Term Care Facility at the Time of the First CMR Offer?: no  Cognitive ability: good  Cognitive impairment status verified this year: no  Recipient: beneficiary  Provider: hospital pharmacist  Visit type: initial  Method of contact: by telephone  Medications at visit: does not bring in medications  Caffeine use: never       Medication Adherence    What concerns does the patient have in regards to their medications: None  Patient reported X missed doses in the last 7 days: 0  Any gaps in refill history greater than 2 weeks in the last 3 months: no  Demonstrates understanding of importance of adherence: yes  Informant: patient  Reliability of informant: reliable  Estimated medication adherence level: 76-89%  Adherence estimation source: claims history  Reasons for non-adherence: no problems identified       No issues reported in regards to accessibility affordability adherence adverse effects organization.    Allergies/Intolerances:   Allergies   Allergen Reactions    Acetazolamide Hives, Rash, Shortness of breath and Swelling     oral hives, redness, ABD pain    Atorvastatin Unknown     Causes muscle  pain    Cefdinir Itching, Rash, Shortness of breath and Anaphylaxis     Itchy throat    Throat closing / difficulty breathing.  Took Benadryl at home.    Itchy throat      Throat closing / difficulty breathing.  Took Benadryl at home.    Ceftriaxone Anaphylaxis, Rash, Shortness of breath and Swelling     SOB    SOB hives turned red during gallbladder    Doxepin Anaphylaxis, Hives, Swelling, Angioedema and Rash     Itchy sore throat problems with swallowing    facial swelling    Itchy sore throat problems with swallowing      facial swelling    Duloxetine Anaphylaxis, Hallucinations and Rash     suicidal ideation    suicidal ideation     Other reaction(s): suicidal ideation    suicidal    Suicidal ideation    Levofloxacin Angioedema, Swelling and Rash     eyelid swelling    eyelid swelling. Patient tolerates Avelox    Levofloxacin In D5w Anaphylaxis     Moon face lips swelling just Levaquin tolerated D5W)    Nutritional Supplements Anaphylaxis     Grapes ( red dye    Ozempic [Semaglutide] Nausea/vomiting     Severe gastroparesis worsening     Prochlorperazine Anaphylaxis     HIGH Compazine involuntary muscle spasms low doses tolerated)    Red Dye Anaphylaxis    Becky Anaphylaxis and Swelling     Becky    SOB facial swelling    Rosemary Oil Anaphylaxis, Itching and Swelling     Becky  SOB facial swelling      SOB facial swelling    Strawberry Shortness of breath     White blisters in mouth      Other reaction(s): white blisters in mouth, shortness of breath    Sulfa (Sulfonamide Antibiotics) Anaphylaxis, Rash, Shortness of breath and Swelling     SOB itchy hives    Other Reaction(s): rash, SOB      SOB itchy hives    Topiramate Anaphylaxis, Swelling and Angioedema     eyelid swelling    Throat swelling    eyelid swelling  Throat swelling      Throat swelling      eyelid swelling    Tree Pollen-[place] Shortness of breath     Other Reaction(s): Other: See Comments      White blisters in mouth       "blisters in mouth-carries an EPIPEN-\"I have gotten short of breath\"    Tree Pollen-Pecan Shortness of breath     pecans    [place] Shortness of breath    Aripiprazole Unknown, Fever and Other     fever and tremors    Fevers and Tremors    Tremor    Aspartame Diarrhea     Blood sugar [place], Diarrhea    Aspartame (Bulk) Diarrhea, Nausea Only and Nausea/vomiting     Other Reaction(s): nausea, diarrhea, abdominal pain      Blood sugar [place], Diarrhea    Fenofibrate Other     Double vision    Gluten Itching, Other and Rash     Rashes constipation gastric discomfort    Hydrochlorothiazide Hives, Itching and Rash     hctz removed as free-text allergy and entered as [facility] allergy,1455 10/6/2007JO      itchy hives    Hydromorphone Unknown, GI intolerance and Nausea Only     Intolerance nausea instant    Iron Hives and Rash     ichy hive ( can tolerate ferrous gluconate)    Meclizine Angioedema, Other and Swelling     eyelid swelling    Swelling of the eyelids    Eyelids and face    Metformin Other     Other reaction(s): Dyspepsia, Dyspepsia    Propoxyphene Unknown and Hives     Darvocet itchy hives    Statins-Hmg-Coa Reductase Inhibitors Unknown     Double vision    Tetracyclines Hives, Itching and Rash     sulfa    Rash itching    Itchy  rash    Thiazides Hives, Itching and Rash     itchy hives    Venlafaxine Unknown and Fever     Fevers chills    Wheat Unknown     oral blisters    Adhesive Tape-Silicones Itching and Other    Barbiturates Unknown    Ciprofloxacin Hives    Dhe Unknown     Raynaud's disease    Diet Foods Diarrhea     Aspartame allergy only. Removes to many foods to interface.    Farxiga [Dapagliflozin] Other     Frequent yeast infections and UTI    Ferrous Sulfate Hives    Nsaids (Non-Steroidal Anti-Inflammatory Drug) Unknown and Nausea/vomiting    Other Unknown    Sulfonylureas Unknown    Tobramycin Unknown    Vancomycin Unknown and Hives     Niyah syndrome    Other reaction(s): Other    Red man syndrome if " "given fast, benadryl with.     Niyah syndrome  Other reaction(s): Other      Other reaction(s): Other      Red man syndrome if given fast, benadryl with.    Adhesive Rash    Azithromycin Hives, Itching and Rash    Betamethasone Nausea And Vomiting, Other, GI intolerance and Diarrhea     N/V/D    House Dust Rash    Metoclopramide Hcl Other    Prednisone Rash    Propoxyphene-Acetaminophen Hives and Rash    Sulfacetamide Sodium Rash and Swelling    Cdbhibxh-0-Cp7 Antimigraine Agents Nausea Only         None due to Raynaud's  ( Triptans cause a stroke)    Zolpidem Other and Hallucinations     Sleep walk    Other Reaction(s): insomnia and agitation      Sleep walk       Current Outpatient Medications   Medication Instructions    acetaminophen (TYLENOL) 325-650 mg, oral, Every 6 hours PRN, Days of infusions     Advair -21 mcg/actuation inhaler INHALE TWO PUFFS BY MOUTH AS INSTRUCTED TWO TIMES A DAY.    amitriptyline (ELAVIL) 100 mg, oral, Nightly    amLODIPine (NORVASC) 5 mg, oral, Daily    amoxicillin-pot clavulanate (Augmentin) 875-125 mg tablet 1 tablet, oral, Every 12 hours scheduled (0630,1830)    azelastine (Astelin) 137 mcg (0.1 %) nasal spray 1 spray, Each Nostril, 2 times daily, Use in each nostril as directed    BD Ultra-Fine Mini Pen Needle 31 gauge x 3/16\" needle USE AS DIRECTED FIVE TIMES A DAY.    blood-glucose sensor (DEXCOM G7 SENSOR MISC) Use to check blood sugar continuously throughout the day as directed. Change sensor every 10 days.    calcium-vitamin D3-vitamin K (Viactiv) 650 mg-12.5 mcg-40 mcg chewable tablet 2 tablets, oral, Daily, At lunch     carboxymethylcellulose (Refresh Celluvisc) 1 % ophthalmic solution dropperette 2 drops, Both Eyes, 3 times daily PRN    cholecalciferol (Vitamin D-3) 5,000 Units tablet 1 tablet, oral, Daily    clonazePAM (KlonoPIN) 0.5 mg tablet 1 tablet, oral, 2 times daily, at the same time.    diclofenac (VOLTAREN) 50 mg, oral, 3 times daily PRN, 30 day supply    " diphenhydrAMINE (BENADryl) 12.5 mg/5 mL liquid 10-20 mL, oral, Daily PRN    divalproex (Depakote ER) 500 mg 24 hr tablet 1 tablet, oral, 2 times daily    EPINEPHrine 0.3 mg/0.3 mL injection syringe INJECT INTRAMUSCULARLY ONCE AS NEEDED FOR ANAPHYLAXIS    ezetimibe (ZETIA) 10 mg, oral, Daily    fluticasone (Flonase) 50 mcg/actuation nasal spray USE 1 SPRAY INTO EACH NOSTRIL ONCE DAILY.    folic acid (FOLVITE) 1 mg, oral, Daily    furosemide (LASIX) 20 mg, oral, 2 times daily    glucagon (Baqsimi) 3 mg/actuation spray,non-aerosol USE ONE SPRAY IN THE NOSE AS NEEDED FOR LOW BLOOD SUGAR  MAY REPEAT AFTER 15 MINUTES USING A NEW DEVICE IF NO RESPONSE    hydrocortisone (CORTEF) 10 mg, oral, 2 times daily, 9/26: Take 20mg (2 tablets) in the morning, take 10mg (1 tablet) at 4PM<BR>9/27: Take 20mg (2 tablets) in the morning, take 10mg (1 tablet) at 4PM<BR>9/28 until your Endocrine appointment (10/10): Take 10mg (1 tablet) in the morning, take 5mg (0.5 tablet) at 4PM    immune globulin, human, (Gammagard) infusion Infuse 600 mL (60 g) into a venous catheter every 14 (fourteen) days.    insulin glargine (Toujeo Max Solostar- 2 unit dial) 300 unit/mL (3 mL) injection Inject 50 units under skin once daily    insulin lispro (HumaLOG KwikPen Insulin) 100 unit/mL injection Use as directed to give up to 100 units a day    ipratropium-albuteroL (Duo-Neb) 0.5-2.5 mg/3 mL nebulizer solution 3 mL, inhalation, 4 times daily PRN    lacosamide (Vimpat) 200 mg tablet tablet 1 tablet, oral, 2 times daily    lacosamide (Vimpat) 50 mg tablet Take 1 tablet (50 mg) by mouth every 12 hours.    levETIRAcetam (KEPPRA) 1,500 mg, oral, 2 times daily    levothyroxine (SYNTHROID, LEVOXYL) 75 mcg, oral, Daily before breakfast    miconazole (Micotin) 2 % powder Apply to groin , under the breast and skin folds daily     moisturizing mouth (Biotene Oral Dry Mouth) solution 1 spray, oral, 4 times daily PRN    montelukast (SINGULAIR) 10 mg, oral, Nightly     Motegrity 2 mg tablet 1 tablet, oral, Daily    nasal spray Nayzilam 5 mg/spray (0.1 mL) spray,non-aerosol nasal    ondansetron ODT (ZOFRAN-ODT) 4 mg, oral, Every 8 hours PRN    OneTouch Delica Plus Lancet 33 gauge misc USE 1 LANCET TO CHECK GLUCOSE ONCE DAILY AS DIRECTED    OneTouch Verio test strips strip USE 1 STRIP TO CHECK GLUCOSE ONCE DAILY AS DIRECTED    pantoprazole (PROTONIX) 40 mg, oral, 2 times daily before meals    polyethylene glycol (GLYCOLAX, MIRALAX) 17 g, oral, Daily PRN    promethazine (Phenergan) 12.5 mg tablet Take 1 tablet (12.5 mg) by mouth if needed.    propranolol LA (INDERAL LA) 60 mg, oral, Daily, Do not crush, chew, or split.    Reyvow 100 mg, oral, As needed, Do not drive in 8 hours of taking this medicine. Maximum one dose per 24 hours.    rOPINIRole (Requip) 0.5 mg tablet Take 1 tablet by mouth (0.5mg) in the morning and 2 tablets (1mg) by mouth in the evening    senna 8.6 mg tablet 1-2 tablets, oral, Nightly PRN    tiZANidine (ZANAFLEX) 2 mg, oral, Every 8 hours PRN    Ubrelvy 100 mg, oral, As needed, May repeat in 2 hours for max of 200mg per 24 hours.    ZINC ORAL 50 mg, oral, Daily     Objective     Lab  Wt Readings from Last 3 Encounters:   09/17/24 118 kg (261 lb)   09/10/24 113 kg (250 lb)   09/01/24 117 kg (258 lb)     Pulse Readings from Last 3 Encounters:   09/25/24 100   09/10/24 105   09/01/24 94     BP Readings from Last 3 Encounters:   09/25/24 126/80   09/10/24 147/90   09/01/24 122/83      Lab Results   Component Value Date    BILITOT 0.3 09/24/2024    CALCIUM 8.8 09/25/2024    CO2 31 09/25/2024     09/25/2024    CREATININE 0.79 09/25/2024    CREATININE 0.79 09/24/2024    CREATININE 0.87 09/23/2024    GLUCOSE 161 (H) 09/25/2024    ALKPHOS 65 09/24/2024    K 4.2 09/25/2024    PROT 6.9 09/24/2024     09/25/2024    AST 11 09/24/2024    ALT 21 09/24/2024    BUN 13 09/25/2024    ANIONGAP 11 09/25/2024    MG 2.16 09/25/2024    PHOS 2.1 (L) 09/25/2024    LDH 95  07/17/2020    ALBUMIN 3.7 09/25/2024    LIPASE 83 (H) 02/22/2024    GFRF 89 08/06/2023    GFRF >90 07/28/2023    GFRF 86 06/13/2023    GFRMALE CANCELED 04/30/2023     Lab Results   Component Value Date    TRIG 272 (H) 04/18/2024    TRIG CANCELED 02/23/2023    TRIG 158 (H) 02/08/2021    CHOL 204 (H) 04/18/2024    CHOL CANCELED 02/23/2023    CHOL 186 02/08/2021    LDLCALC 103 (H) 04/18/2024    HDL 46.5 04/18/2024    HDL CANCELED 02/23/2023    HDL 46.3 02/08/2021     Lab Results   Component Value Date    HGBA1C 7.2 (H) 09/20/2024    HGBA1C 7.9 (H) 04/18/2024    HGBA1C 5.9 (H) 11/08/2023     Drug Interactions:  No pertinent DDI    Assessment/Plan    Indication, effectiveness, safety and convenience of her medications were reviewed today. The patient's medical conditions were assessed, evaluated, and deemed meeting goals of drug therapy.     Comments/Recommendations to PCP:  None    Davida Kathleen PharmD    Verbal consent to manage patient's drug therapy was obtained from the patient and/or an individual authorized to act on behalf of a patient. They were informed they may decline to participate or withdraw from participation in pharmacy services at any time.
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
## 2024-10-02 NOTE — PROGRESS NOTES
"Assessment and Plan    06/08/2021 +OKN response OD  09/30/2019 +OKN response OD    09/24/2024 CT head without contrast, which I personally reviewed, shows interval placement of a right frontal approach ventriculoperitoneal shunt.  09/23/2024 CT head without contrast, which I personally reviewed, shows no lesion.  09/21/2024 MRI brain without contrast, which I personally reviewed, shows stable findings.  09/21/2024 CT head without contrast & CTA head & neck, which I personally reviewed, shows the right frontal encephalomalacia.  09/17/2024 MRI brain & orbits with contrast & MRV head, which I personally reviewed, show stable right frontal encephalomalacia.    09/02/2024 CT head without contrast, which I personally reviewed previously, shows right frontal encephalomalacia stable from prior.  08/28/2024 CTA head and neck & CT head without contrast, which I personally reviewed previously, show right frontal encephalomalacia stable from prior.  08/06/2024 CTA head & neck & CT head without contrast, which I personally reviewed previously, show right frontal encephalomalacia stable from prior.  07/09/2024 CT head without contrast, which I personally reviewed previously, shows stable right frontal encephalomalacia.  06/05/2024 MRI orbits with contrast, which I personally reviewed previously, shows right frontal encephalomalacia similar to prior imaging.  11/07/2023 MRI brain without contrast, by report from [facility], shows \"No acute intracranial abnormality including no evidence of an acute or recent brain parenchymal infarct. \"  11/07/2023 CTA head & neck & CT head without contrast, by report from [facility], show \"No acute abnormality of the cervical and intracranial vasculature.\" And \"No evidence of an acute intracranial process.  Focal RIGHT frontal lobe encephalomalacia deep to a sherley hole, new from 02/25/2020. \"  08/01/2023 MRV head, which I personally reviewed previously, shows no lesion.  07/29/2023 MRI " "brain & orbits with contrast, which I personally reviewed previously, shows right frontal encephalomalacia consistent with prior bolt.  Interval head imaging.  10/05/2022 CT head without contrast & CTA head & neck, by report from [facility], show \" 1.   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  Overlying right-sided sherley hole.)  2.  Prominence of the ventricles and sulci for age.  \" and \"1. Similar appearing old areas of infarction involving the right and left frontal lobes extending to the convexities with underlying encephalomalacia at site of prior hemorrhage from 02/13/2022. Overlying right-sided sherley hole. Prominence of the ventricles and sulci for age. No evidence of acute infarction. No active hemorrhage. 2. No significant stenosis internal carotid arteries. 3. No significant stenosis of the intracranial vessels or evidence of aneurysm. 4. Aberrant right subclavian artery behind the esophagus with no dilation of the proximal esophagus.\"  09/25/2022 CT head without contrast, which I personally reviewed previously, shows no lesion.  04/20/2022 CT head without contrast, which I personally reviewed previously, shows right frontal encephalomalacia judged stable by Radiology.  Interval head imaging.  09/10/2021 MRI brain with contrast, which I personally reviewed previously, shows no lesion.  09/09/2021 CTA head & neck, which I personally reviewed previously, shows no lesion.  09/09/2021 CT head without contrast, which I personally reviewed previously, shows no lesion.  08/11/2021 MRI brain & orbits with contrast & MRV head, which I personally reviewed previously, show left optic atrophy with Radiology noting “Punctate focus of enhancement seen along the left 7th-8th cranial nerve bundle at the level of the porus acusticus, indeterminate. Otherwise unremarkable " MRI of the brain.”  Interval head imaging.  06/04/2021 MRI brain & orbits with contrast, which I personally reviewed previously, shows left optic atrophy.  Interval head imaging.  06/29/2017 MRI brain without contrast, which I personally reviewed previously, shows no lesion.  11/22/2007 CT head without contrast, which I personally reviewed previously, shows no lesion.    09/18/2024 lumbar puncture tube 1: RBC 1000, WBC 6, tube 4:  & WBC 5, protein 79 & glucose 154. IgG studies with high albumin and albumin index with high cerebrospinal fluid (CSF) IgG/albumin ratio. Oligoclonal bands negative. Cytology negative. Flow cytometry negative. Encephalopathy autoimmune evaluation negative. Meningitis pathogen panel, HSV PCR, VZV PCR, EBV PCR, toxoplasma PCR, West Nile IgG & IgM, VDRL, fungal smear negative.    08/31/2023 lumbar puncture opening pressure 52 cm water, tube 1: , WBC 0, tube 4: RBC 6 & WBC 0, protein 91 & glucose 71.  08/11/2021 lumbar puncture opening pressure 18 cm water, tube 1: RBC 0, WBC 16 (86% L, 13% M), tube 4: RBC 0 & WBC 16 (92% L, 8% M), protein 71 & glucose 61. Cytology negative. Flow cytometry negative. Oligoclonal bands 0. IgG studies with high CSF IgG & albumin.  08/05/2020 lumbar puncture opening pressure 20 cm water, protein 68, glucose 85. MBP wnl. Oligoclonal bands POSITIVE 4.  12/26/2019 lumbar puncture no opening pressure checked per patient) tube ?: RBC 39, WBC 6 (91% L, 9% M), tube ?: RBC 38, WBC 5, protein 89, glucose 79. (via [employer] from [employer])  11/13/2019 lumbar puncture opening pressure 22 cm water, tube 1: RBC 9, WBC 4, tube 4: RBC 1 & WBC 5, protein 82 & glucose 61. Oligoclonal bands 0. IgG studies with non-specific abnormalities. HSV PCR negative.  09/20/2019 lumbar puncture opening pressure 20 cm water, RBC 1, WBC 7 (96% L, 4% M), protein 94 & glucose 78.  01/31/2015 lumbar puncture RBC 2, WBC 0, protein 104 & glucose 74.    08/18/2024 Electrodiagnostic  testing.  ffERG: Normal (OU).  Responses of L-/M-cones and S-cones: No abnormality can be detected (OU).  Responses of On- and Off-bipolar cells in cone pathway: Normal (OU).  PhNR (RGC function):  OD: Amplitude reduces mildly and implicit time prolongs mildly.  OS: Amplitude reduces moderately and implicit time prolongs mildly.  mfERG: No abnormality can be found (OU).  PVEP:  OD: Normal.  OS: Amplitude reduces severely.  Hem[place]ield PVEP:  OD: Left-field PVEP amplitude is significantly lower than right-field PVEP.  OS: PVEP of both sides show no detectable components.  07/27/2019 multifocal ERG with mildly reduced central responses.  Pattern VEP with OD borderline latency at 0.25 degrees and OS with severely prolonged latencies and decreased amplitudes.    Lab Results   Component Value Date/Time    SEDRATE 19 08/01/2023 1558    SEDRATE 33 (H) 08/07/2022 0322    SEDRATE 19 05/25/2022 1501    SEDRATE 3 06/05/2020 1110    SEDRATE 6 05/13/2020 1529    SEDRATE 3 03/28/2020 0629    SEDRATE 5 09/16/2019 0507    SEDRATE 8 08/19/2019 1314    CRP 0.90 08/07/2022 0322    CRP 0.35 05/25/2022 1501    CRP 0.34 09/23/2021 0618    CRP 0.71 09/21/2021 0648    CRP 0.14 07/16/2020 0944    CRP 0.10 06/05/2020 1110    CRP <0.10 05/13/2020 1529    CRP <0.10 03/28/2020 0629    CRP 3.64 (A) 11/21/2019 2157    CRP <0.10 08/19/2019 1314      Lab Results   Component Value Date/Time    AGQKIHLH48 383 09/17/2024 0242    ESIXKAXP88 299 02/23/2023 0941    USFFCANT11 709 02/09/2021 0451    SAMZRYHD72 508 12/10/2019 1349    FOLATE >24.0 09/17/2024 0242    RCFOL 951 12/10/2019 1349    UXRI7AX 142 09/17/2024 0250    VTAI Normal 09/17/2024 0242    VITAMINA 0.53 09/17/2024 0242    VTAR 0.02 09/17/2024 0242      Lab Results   Component Value Date/Time    NMOAQP4 Negative 09/17/2024 0242    NMOAQP4 Negative 11/14/2019 1447    MOGFACS Negative 09/17/2024 0242    MOGFACS Negative 11/10/2020 1000    MOGFACS Negative 11/14/2019 1447    ACE 41 09/17/2024 0242       Tuberculosis studies  Lab Results   Component Value Date/Time    TBSIN Negative 09/17/2024 0250    TBSIN Negative 09/22/2021 0430    TBSIN Negative 11/10/2020 1000    TBSIN Negative 11/14/2019 0531      Lab Results   Component Value Date/Time    XJCYCLJU41 383 09/17/2024 0242    OCUCQMIV14 299 02/23/2023 0941    ZYILWOHS66 709 02/09/2021 0451    IMOXSZXN53 508 12/10/2019 1349    FOLATE >24.0 09/17/2024 0242    RCFOL 951 12/10/2019 1349    DKIV5CT 142 09/17/2024 0250    VTAI Normal 09/17/2024 0242    VITAMINA 0.53 09/17/2024 0242    VTAR 0.02 09/17/2024 0242        09/17/2024 syphilis REACTIVE. Treponema confirm & RPR non-reactive (NR). T-SPOT TB negative. Bartonella abs, Lyme PCR, RMSF abs, Toxoplasma IgG & IgM negative.  12/04/2023 ESR 10. CRP <0.3 mg/dL.  10/01/2023 syphilis non-reactive (NR).  09/18/2020 ESR 1. CRP < 0.08 mg/dL (0.8 mg/L).  08/28/2020 HbA1c HIGH 7.0. Lipid panel with LDL 64.  08/05/2020 B12 462. Folate wnl. AQP4 ab negative.  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 Suarez.  07/09/2019 BRETT > 1:640. Anti-centromere HIGH 101 (0-40). scl-70 negative. Chromatin ab IgG, anti-ribosomal ab, anti-Sarahy ab, anti-DNA ab negative.    10/03/2024 OCT RNFL  & OS 39. (Lower OD & stable OS)  09/16/2024 OCT RNFL  & OS 36. (Interval new elevation OD & stable thinning OS)  07/25/2024 OCT RNFL OD 89 & OS 37. (Decrease, now at baseline of early 2024)  06/26/2024 OCT RNFL OD 95 & OS 41. (Stable)  06/05/2024 OCT RNFL OD 95 & OS 40. (Stable)  OCT macula OD normal foveal contour 264 & OS normal foveal contour 234.  03/15/2024 OCT RNFL OD 92 & OS 36. (Stable)  01/09/2024 OCT RNFL OD 89 & OS 36. (Stable)  02/06/2023 OCT RNFL OD 92 & OS 38. (stable)  OCT macula OD normal foveal contour 255 & OS thinning RNFL miscentered wrt fovea.  10/07/2022 OCT RNFL OD 92 & OS 40. (decreased OD & stable OS)  09/23/2022 OCT RNFL OD 98 & OS 38. (increased OD & stable OS)  01/27/2022 OCT RNFL OD 88  & OS 37. (stable)  10/06/2021 OCT RNFL OD 93 & OS 39 (stable)..  08/19/2021 OCT RNFL OD 92 & OS 38. (stable)  06/08/2021 OCT RNFL OD 87 & OS 37. (stable)  01/28/2021 OCT RNFL OD 85 & OS 37. (stable)  11/06/2020 OCT RNFL OD 89 & OS 39. (stable)  07/27/2020 OCT RNFL OD 89 & OS 38. (stable)  01/07/2020 OCT RNFL OD 93 & OS 38. (stable to mild increase OD, stable to mild decrease OS)  11/27/2019 OCT RNFL OD 84 & OS 42. (stable to mild OD decrease)  09/30/2019 OCT RNFL OD 89 & OS 41. (stable)  07/18/2019 OCT RNFL OD 90 & OS 39.  OCT macula OD normal foveal contour 256 & OS thinning of RNFL & GCL, but preserved foveal contour 238.    10/03/2024 HVF 24-2 OD fovea 4, generalized depression MD -29.75 & OS stimulus V, fovea 12, central, superior nasal step & inferior arcuate defects.  09/16/2024 HVF 24-2 OD stimulus III, fovea 14, generalized depression MD -31.43 & OS stimulus V, fovea 1, generalized depression with some superior temporal sparing.  07/25/2024 HVF 24-2 OD stimulus V, fovea 39, superior > inferior nasal step & OS stimulus V, fovea 18, generalized depression with some superior temporal sparing (inferior > superior altitudinal.  06/26/2024 HVF 24-2 OD fovea 33, FL 3/15, FP 0%, FN 20%, superior arcuate MD -13.69 & OS stimulus V, fovea 27, generalized depression.  06/05/2024 HVF 24-2 OD fovea 34, FL 2/16, FP 0%, FN 40%, superior altitudinal MD -18.00 & OS stimulus V, fovea 25, generalized depression.  03/15/2024 HVF 24-2 OD fovea 36, wnl MD -1.05 & OS stimulus V, fovea 3, inferior arcuate > superior arcuate.  01/09/2024 HVF 24-2 OD wnl MD -0.94 & OS generalized depression MD -32.10.  02/06/2023 HVF 24-2 OD fovea 36, FL 1/17, FP 0%< FN 15%, scatter MD -7.58 & OS stimulus V, fovea 19, generalized depression.  10/07/2022 HVF 24-2 OD fovea 35, wnl MD -1.26 & OS fovea 5, generalized depression MD -30.62.  09/23/2022 HVF 24-2 OD fovea 38, scatter MD -2.67 & OS stimulus V, fovea 16, superior arcuate & inferior  arcuate.  01/27/2022 HVF 24-2 OD fovea 36, wnl MD -1.67 & OS stimulus V, fovea 28, generalized reduction.  10/06/2021 HVF 24-2 OD fovea 36, scatter MD -4.76 & OS stimulus V, generalized reduction.  08/19/2021 HVF 24-2 OD fovea 39, wnl MD -2.31 & OS stimulus V, fovea 28, generalized depression.  06/08/2021 HVF Stimulus V OD fovea 34, wnl & OS stimulus V, fovea 24, generalized depression.  01/28/2021 HVF 24-2 OD fovea 40, wnl MD -0.63 & OS stimulus V, fovea 28, superior nasal step & inferior arcuate.  11/06/2020 HVF 24-2 OD fovea 32, possible inferior > superior arcuate MD -14.71 & OS stimulus V, fovea 10, generalized depression.  07/27/2020 HVF 24-2 OD fovea 39, wnl MD -2.45 & OS stimulus V, fovea 27, superior & inferior altitudinal.    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    The right optic disc edema is less than the last examination, but the optic disc edema has not resolved. The evaluation points to two main etiologies, sequential non-arteritic anterior ischemic optic neuropathy versus papilledema related to intracranial pressure (ICP) elevation and idiopathic intracranial hypertension given the lack of findings of intracranial mass, venous sinus thrombosis or meningitic disease. The evaluation was negative for infiltrating lesions, optic neuritis and neuro-retinitis. The ventriculoperitoneal shunt should be helpful with intracranial pressure (ICP) elevation. We discussed neurosurgical follow up to make sure the ventriculoperitoneal shunt is functioning. I advise continued furosemide as well for now.    Regarding optic nerve  sheath fenestration, I do not see a role now given the relatively preserved central vision and risks with the procedure along with the evidence from the IONDTT that optic nerve sheath fenestration does not have a role in the non-arteritic anterior ischemic optic neuropathy component of this illness.    The wound appears to be responding to treatment, but we discussed returning to a surgeon or an emergency department for drainage or worsening redness.    Plan    Continue furosemide.  Evaluation of ventriculoperitoneal shunt with neurosurgeon Dr. Juancarlos Tafoya.  Vasculopathic risk factor control.    Follow up in 3-4 weeks with HVF & OCT. (dilated 6/5/2024)

## 2024-10-03 ENCOUNTER — TELEPHONE (OUTPATIENT)
Dept: ENDOCRINOLOGY | Facility: CLINIC | Age: 49
End: 2024-10-03

## 2024-10-03 ENCOUNTER — APPOINTMENT (OUTPATIENT)
Dept: OPHTHALMOLOGY | Facility: CLINIC | Age: 49
End: 2024-10-03
Payer: MEDICAID

## 2024-10-03 DIAGNOSIS — H47.012 NAION (NON-ARTERITIC ANTERIOR ISCHEMIC OPTIC NEUROPATHY), LEFT EYE: ICD-10-CM

## 2024-10-03 DIAGNOSIS — J45.40 MODERATE PERSISTENT ALLERGIC ASTHMA (HHS-HCC): ICD-10-CM

## 2024-10-03 DIAGNOSIS — E11.65 TYPE 2 DIABETES MELLITUS WITH HYPERGLYCEMIA, WITH LONG-TERM CURRENT USE OF INSULIN: Primary | ICD-10-CM

## 2024-10-03 DIAGNOSIS — Z79.4 TYPE 2 DIABETES MELLITUS WITH HYPERGLYCEMIA, WITH LONG-TERM CURRENT USE OF INSULIN: Primary | ICD-10-CM

## 2024-10-03 DIAGNOSIS — G93.2 BENIGN INTRACRANIAL HYPERTENSION: ICD-10-CM

## 2024-10-03 DIAGNOSIS — H47.20 OPTIC ATROPHY: ICD-10-CM

## 2024-10-03 DIAGNOSIS — H47.10 EDEMA OF OPTIC DISC OF RIGHT EYE: Primary | ICD-10-CM

## 2024-10-03 LAB
ATRIAL RATE: 104 BPM
P AXIS: 65 DEGREES
P OFFSET: 198 MS
P ONSET: 137 MS
PR INTERVAL: 166 MS
Q ONSET: 220 MS
QRS COUNT: 17 BEATS
QRS DURATION: 84 MS
QT INTERVAL: 362 MS
QTC CALCULATION(BAZETT): 476 MS
QTC FREDERICIA: 435 MS
R AXIS: 28 DEGREES
T AXIS: 55 DEGREES
T OFFSET: 401 MS
VENTRICULAR RATE: 104 BPM

## 2024-10-03 PROCEDURE — 92083 EXTENDED VISUAL FIELD XM: CPT | Performed by: PSYCHIATRY & NEUROLOGY

## 2024-10-03 PROCEDURE — 92133 CPTRZD OPH DX IMG PST SGM ON: CPT | Performed by: PSYCHIATRY & NEUROLOGY

## 2024-10-03 PROCEDURE — 99214 OFFICE O/P EST MOD 30 MIN: CPT | Performed by: PSYCHIATRY & NEUROLOGY

## 2024-10-03 ASSESSMENT — CUP TO DISC RATIO
OD_RATIO: OBSCURED
OS_RATIO: 0.15

## 2024-10-03 ASSESSMENT — TONOMETRY
IOP_METHOD: GOLDMANN APPLANATION
OD_IOP_MMHG: 18
OS_IOP_MMHG: 21

## 2024-10-03 ASSESSMENT — VISUAL ACUITY
CORRECTION_TYPE: GLASSES
METHOD: SNELLEN - LINEAR
OS_PH_CC: 20/200
OS_CC: 20/300
OD_PH_CC+: -1
OD_CC: 20/60
OD_PH_CC: 20/50

## 2024-10-03 ASSESSMENT — REFRACTION_WEARINGRX
OS_AXIS: 170
OS_SPHERE: -1.25
OS_CYLINDER: -3.00
SPECS_TYPE: SVL
OD_AXIS: 015
OD_SPHERE: -1.50
OD_CYLINDER: -3.00

## 2024-10-03 ASSESSMENT — ENCOUNTER SYMPTOMS
ALLERGIC/IMMUNOLOGIC NEGATIVE: 0
RESPIRATORY NEGATIVE: 0
EYES NEGATIVE: 1
NEUROLOGICAL NEGATIVE: 0
CONSTITUTIONAL NEGATIVE: 0
MUSCULOSKELETAL NEGATIVE: 0
ENDOCRINE NEGATIVE: 0
GASTROINTESTINAL NEGATIVE: 0
CARDIOVASCULAR NEGATIVE: 0
HEMATOLOGIC/LYMPHATIC NEGATIVE: 0
PSYCHIATRIC NEGATIVE: 0

## 2024-10-03 ASSESSMENT — CONF VISUAL FIELD
OS_INFERIOR_NASAL_RESTRICTION: 2
OD_SUPERIOR_NASAL_RESTRICTION: 0
OS_SUPERIOR_TEMPORAL_RESTRICTION: 2
OD_INFERIOR_NASAL_RESTRICTION: 1
OD_INFERIOR_TEMPORAL_RESTRICTION: 1
OS_SUPERIOR_NASAL_RESTRICTION: 2
OS_INFERIOR_TEMPORAL_RESTRICTION: 2

## 2024-10-03 ASSESSMENT — EXTERNAL EXAM - LEFT EYE: OS_EXAM: NORMAL

## 2024-10-03 ASSESSMENT — EXTERNAL EXAM - RIGHT EYE: OD_EXAM: NORMAL

## 2024-10-03 ASSESSMENT — SLIT LAMP EXAM - LIDS
COMMENTS: NORMAL
COMMENTS: NORMAL

## 2024-10-03 NOTE — TELEPHONE ENCOUNTER
[facility] requesting chart notes  Chart notes faxed to number provided with confirmation

## 2024-10-04 ENCOUNTER — PATIENT OUTREACH (OUTPATIENT)
Dept: PRIMARY CARE | Facility: CLINIC | Age: 49
End: 2024-10-04

## 2024-10-04 ENCOUNTER — APPOINTMENT (OUTPATIENT)
Dept: OPHTHALMOLOGY | Facility: CLINIC | Age: 49
End: 2024-10-04
Payer: MEDICAID

## 2024-10-04 NOTE — PROGRESS NOTES
Unable to reach patient for call back after recent hospitalization.   LVM with call back number for patient to call if needed   If no voicemail available call attempts x 2 were made to contact the patient to assist with any questions or concerns patient may have.    Britney Phipps LPN

## 2024-10-07 ENCOUNTER — DOCUMENTATION (OUTPATIENT)
Dept: PRIMARY CARE | Facility: CLINIC | Age: 49
End: 2024-10-07
Payer: MEDICAID

## 2024-10-07 LAB
FUNGUS SPEC CULT: NORMAL
FUNGUS SPEC FUNGUS STN: NORMAL

## 2024-10-07 PROCEDURE — RXMED WILLOW AMBULATORY MEDICATION CHARGE

## 2024-10-08 ENCOUNTER — TELEPHONE (OUTPATIENT)
Dept: PRIMARY CARE | Facility: CLINIC | Age: 49
End: 2024-10-08
Payer: MEDICAID

## 2024-10-08 NOTE — TELEPHONE ENCOUNTER
[facility] called to see if an order for bath wipes and gloves can be sent to [facility].     Also  requested a new mattress for hospital bed from [facility]

## 2024-10-09 ENCOUNTER — CLINICAL SUPPORT (OUTPATIENT)
Dept: NEUROSURGERY | Facility: HOSPITAL | Age: 49
End: 2024-10-09
Payer: MEDICAID

## 2024-10-09 ENCOUNTER — PHARMACY VISIT (OUTPATIENT)
Dept: PHARMACY | Facility: CLINIC | Age: 49
End: 2024-10-09
Payer: MEDICAID

## 2024-10-09 DIAGNOSIS — N39.3 FEMALE STRESS INCONTINENCE: ICD-10-CM

## 2024-10-09 NOTE — PROGRESS NOTES
I had the pleasure of seeing Jonathan SILVINA Ayala for wound/incision check after her shunt placement with Dr. Tafoya. She has finished her course of antibiotics for left abdominal incision discharge. The incision is now free of redness, swelling, and discharge. We reviewed incision care and follow up appointment. Jonathankaren Ayala agrees and has no further questions. They will see Dr. Tafoya in a few weeks for follow up.

## 2024-10-10 ENCOUNTER — APPOINTMENT (OUTPATIENT)
Dept: ENDOCRINOLOGY | Facility: CLINIC | Age: 49
End: 2024-10-10
Payer: MEDICAID

## 2024-10-10 DIAGNOSIS — Z79.4 TYPE 2 DIABETES MELLITUS WITH HYPERGLYCEMIA, WITH LONG-TERM CURRENT USE OF INSULIN: Primary | ICD-10-CM

## 2024-10-10 DIAGNOSIS — E27.40 ADRENAL INSUFFICIENCY (MULTI): ICD-10-CM

## 2024-10-10 DIAGNOSIS — E11.65 TYPE 2 DIABETES MELLITUS WITH HYPERGLYCEMIA, WITH LONG-TERM CURRENT USE OF INSULIN: Primary | ICD-10-CM

## 2024-10-10 PROCEDURE — 3051F HG A1C>EQUAL 7.0%<8.0%: CPT | Performed by: NURSE PRACTITIONER

## 2024-10-10 PROCEDURE — 3049F LDL-C 100-129 MG/DL: CPT | Performed by: NURSE PRACTITIONER

## 2024-10-10 PROCEDURE — 99214 OFFICE O/P EST MOD 30 MIN: CPT | Performed by: NURSE PRACTITIONER

## 2024-10-10 PROCEDURE — 1036F TOBACCO NON-USER: CPT | Performed by: NURSE PRACTITIONER

## 2024-10-10 PROCEDURE — 95251 CONT GLUC MNTR ANALYSIS I&R: CPT | Performed by: NURSE PRACTITIONER

## 2024-10-10 NOTE — Clinical Note
She was in hospital and they were weaning her hydrocortisone.  As of recent she has been taking hydrocortisone 10 mg in the morning and 5 mg in the afternoon.  Prior to hospital she was taking hydrocortisone 10 mg once daily in the morning.  Do you want to adjust her dose further ?

## 2024-10-10 NOTE — Clinical Note
Please follow up with Joanna in 2 months for med adjustment.  She has seen him before.  Will keep appt with Dr. Felix in [place]

## 2024-10-10 NOTE — PROGRESS NOTES
Subjective   Jonathan Ayala is a 48 y.o. female presents today for a follow up of Type 2 diabetes. Initial diagnosis with diabetes was 2003.   Known complications include: hyperlipidemia, obesity, gastroparesis, peripheral neuropathy   Personal history of seizures     Patient of Dr. Felix and last seen by her 6/2024, Previously saw Dr. Felix at .   Last visit with me 1/2023  A1c 7.2% on 9/20/2024.  Previous A1c 7.9% in 4/2024.   Since last visit, she was using OP5   OP5 pods were falling off the adhesive   She was having skin reactions, describes as skin burns  She likes MDI better      Historical meds:   metformin GI side effects  Ozempic        Current diabetes regimen is as follows:   Toujeo Max 54 units once daily (Previously 50 units)   Humalog carb ratio 1:3, ISF 2 units for every 30 over 150.        Patient is using continuous glucose monitor - Dexcom G7  The patient is currently checking the blood glucose as needed.        Hypoglycemia frequency: 0%  Hypoglycemia awareness: yes     The patient comes into the office today with variable blood sugars.        History of adrenal insuffiency  On hydrocortisone  Currently taking 10 mg at 8 AM and 5 mg at 4 PM      ROS  General: no fever, chills or acute changes in weight in the last 6 months  Skin: no rashes, pruritis or dry skin  Cardiac: denies chest pain, heart palpitations or orthopnea  Pulmonary: denies wheezing, productive cough or exertional dyspnea      Objective    Physical Exam  Unable to obtain blood pressure today due to virtual visit  General Appearance: Well appearing, alert, in no acute distress, well-hydrated, well nourished.  Affect: alert, cooperative         Current Outpatient Medications:     acetaminophen (Tylenol) 325 mg tablet, Take 1-2 tablets (325-650 mg) by mouth every 6 hours if needed for mild pain (1 - 3). Days of infusions, Disp: , Rfl:     Advair -21 mcg/actuation inhaler, INHALE TWO PUFFS BY MOUTH AS INSTRUCTED TWO  "TIMES A DAY., Disp: , Rfl:     amitriptyline (Elavil) 100 mg tablet, Take 1 tablet (100 mg) by mouth once daily at bedtime., Disp: 30 tablet, Rfl: 11    amLODIPine (Norvasc) 5 mg tablet, Take 1 tablet (5 mg) by mouth once daily., Disp: , Rfl:     azelastine (Astelin) 137 mcg (0.1 %) nasal spray, Administer 1 spray into each nostril 2 times a day. Use in each nostril as directed, Disp: , Rfl:     BD Ultra-Fine Mini Pen Needle 31 gauge x 3/16\" needle, USE AS DIRECTED FIVE TIMES A DAY., Disp: , Rfl:     blood-glucose sensor (DEXCOM G7 SENSOR MISC), Use to check blood sugar continuously throughout the day as directed. Change sensor every 10 days., Disp: , Rfl:     calcium-vitamin D3-vitamin K (Viactiv) 650 mg-12.5 mcg-40 mcg chewable tablet, Chew 2 tablets once daily. At lunch, Disp: , Rfl:     carboxymethylcellulose (Refresh Celluvisc) 1 % ophthalmic solution dropperette, Administer 2 drops into both eyes 3 times a day as needed for dry eyes., Disp: , Rfl:     cholecalciferol (Vitamin D-3) 5,000 Units tablet, Take 1 tablet (5,000 Units) by mouth once daily., Disp: , Rfl:     clonazePAM (KlonoPIN) 0.5 mg tablet, Take 1 tablet (0.5 mg) by mouth 2 times a day. at the same time., Disp: , Rfl:     Dexcom G7  misc, Use as instructed to check blood sugars continuously throughout the day, Disp: , Rfl:     diclofenac (Voltaren) 50 mg EC tablet, Take 1 tablet (50 mg) by mouth 3 times a day as needed (migraine). 30 day supply, Disp: 60 tablet, Rfl: 3    diphenhydrAMINE (BENADryl) 12.5 mg/5 mL liquid, Take 10-20 mL (25-50 mg) by mouth once daily as needed for allergies (or migraines)., Disp: , Rfl:     divalproex (Depakote ER) 500 mg 24 hr tablet, Take 1 tablet (500 mg) by mouth 2 times a day., Disp: , Rfl:     EPINEPHrine 0.3 mg/0.3 mL injection syringe, INJECT INTRAMUSCULARLY ONCE AS NEEDED FOR ANAPHYLAXIS, Disp: 2 each, Rfl: 0    ezetimibe (Zetia) 10 mg tablet, Take 1 tablet (10 mg) by mouth once daily., Disp: 30 " tablet, Rfl: 11    fluticasone (Flonase) 50 mcg/actuation nasal spray, USE 1 SPRAY INTO EACH NOSTRIL ONCE DAILY., Disp: 16 g, Rfl: 3    folic acid (Folvite) 1 mg tablet, Take 1 tablet (1 mg) by mouth once daily., Disp: 90 tablet, Rfl: 3    furosemide (Lasix) 20 mg tablet, Take 1 tablet (20 mg) by mouth 2 times a day., Disp: 180 tablet, Rfl: 3    gloves, latex with aloe vera misc, Large size gloves, Disp: 200 each, Rfl: 3    glucagon (Baqsimi) 3 mg/actuation spray,non-aerosol, USE ONE SPRAY IN THE NOSE AS NEEDED FOR LOW BLOOD SUGAR  MAY REPEAT AFTER 15 MINUTES USING A NEW DEVICE IF NO RESPONSE, Disp: 1 each, Rfl: 3    hydrocortisone (Cortef) 10 mg tablet, Take 1 tablet (10 mg) by mouth 2 times a day. 9/26: Take 20mg (2 tablets) in the morning, take 10mg (1 tablet) at 4PM 9/27: Take 20mg (2 tablets) in the morning, take 10mg (1 tablet) at 4PM 9/28 until your Endocrine appointment (10/10): Take 10mg (1 tablet) in the morning, take 5mg (0.5 tablet) at 4PM, Disp: 300 tablet, Rfl: 0    immune globulin, human, (Gammagard) infusion, Infuse 600 mL (60 g) into a venous catheter every 14 (fourteen) days., Disp: , Rfl:     insulin glargine (Toujeo Max Solostar- 2 unit dial) 300 unit/mL (3 mL) injection, Inject 50 units under skin once daily, Disp: 9 mL, Rfl: 6    insulin lispro (HumaLOG KwikPen Insulin) 100 unit/mL injection, Use as directed to give up to 100 units a day, Disp: 90 mL, Rfl: 3    ipratropium-albuteroL (Duo-Neb) 0.5-2.5 mg/3 mL nebulizer solution, Inhale 3 mL 4 times a day as needed., Disp: , Rfl:     lacosamide (Vimpat) 200 mg tablet tablet, Take 1 tablet (200 mg) by mouth 2 times a day., Disp: , Rfl:     lacosamide (Vimpat) 50 mg tablet, Take 1 tablet (50 mg) by mouth every 12 hours., Disp: , Rfl:     lasmiditan 100 mg tablet, Take 100 mg by mouth if needed (migraine). Do not drive in 8 hours of taking this medicine. Maximum one dose per 24 hours., Disp: 9 tablet, Rfl: 3    levETIRAcetam (Keppra) 750 mg tablet,  Take 2 tablets (1,500 mg) by mouth 2 times a day., Disp: , Rfl:     levothyroxine (Synthroid, Levoxyl) 50 mcg tablet, Take 1.5 tablets (75 mcg) by mouth once daily in the morning. Take before meals., Disp: 45 tablet, Rfl: 11    miconazole (Micotin) 2 % powder, Apply to groin , under the breast and skin folds daily, Disp: , Rfl:     miscellaneous medical supply misc, Bath Wipes, Disp: 200 each, Rfl: 3    moisturizing mouth (Biotene Oral Dry Mouth) solution, Take 1 spray by mouth 4 times a day as needed., Disp: , Rfl:     montelukast (Singulair) 10 mg tablet, Take 1 tablet (10 mg) by mouth once daily at bedtime., Disp: 90 tablet, Rfl: 0    Motegrity 2 mg tablet, Take 1 tablet (2 mg) by mouth once daily., Disp: , Rfl:     nasal spray Nayzilam 5 mg/spray (0.1 mL) spray,non-aerosol, Administer into affected nostril(s)., Disp: , Rfl:     ondansetron ODT (Zofran-ODT) 4 mg disintegrating tablet, Take 1 tablet (4 mg) by mouth every 8 hours if needed for nausea or vomiting., Disp: , Rfl:     OneTouch Delica Plus Lancet 33 gauge misc, USE 1 LANCET TO CHECK GLUCOSE ONCE DAILY AS DIRECTED, Disp: , Rfl:     OneTouch Verio test strips strip, USE 1 STRIP TO CHECK GLUCOSE ONCE DAILY AS DIRECTED, Disp: , Rfl:     pantoprazole (Protonix) 40 mg EC tablet, Take 1 tablet (40 mg) by mouth 2 times a day before meals., Disp: , Rfl:     polyethylene glycol (Glycolax, Miralax) 17 gram/dose powder, Mix 17 g of powder and drink once daily as needed for constipation., Disp: , Rfl:     promethazine (Phenergan) 12.5 mg tablet, Take 1 tablet (12.5 mg) by mouth if needed., Disp: , Rfl:     propranolol LA (Inderal LA) 60 mg 24 hr capsule, Take 1 capsule (60 mg) by mouth once daily. Do not crush, chew, or split., Disp: 30 capsule, Rfl: 11    rOPINIRole (Requip) 0.5 mg tablet, Take 1 tablet by mouth (0.5mg) in the morning and 2 tablets (1mg) by mouth in the evening, Disp: , Rfl:     senna 8.6 mg tablet, Take 1-2 tablets (8.6-17.2 mg) by mouth as needed  at bedtime for constipation., Disp: , Rfl:     tiZANidine (Zanaflex) 2 mg tablet, Take 1 tablet (2 mg) by mouth every 8 hours if needed for muscle spasms., Disp: 90 tablet, Rfl: 11    ubrogepant (Ubrelvy) 100 mg tablet tablet, Take 1 tablet (100 mg) by mouth if needed (migraine). May repeat in 2 hours for max of 200mg per 24 hours., Disp: 16 tablet, Rfl: 3    ZINC ORAL, Take 50 mg by mouth once daily., Disp: , Rfl:     Lab Results   Component Value Date    BILITOT 0.3 09/24/2024    CALCIUM 8.8 09/25/2024    CO2 31 09/25/2024     09/25/2024    CREATININE 0.79 09/25/2024    GLUCOSE 161 (H) 09/25/2024    ALKPHOS 65 09/24/2024    K 4.2 09/25/2024    PROT 6.9 09/24/2024     09/25/2024    AST 11 09/24/2024    ALT 21 09/24/2024    BUN 13 09/25/2024    ANIONGAP 11 09/25/2024    MG 2.16 09/25/2024    PHOS 2.1 (L) 09/25/2024    LDH 95 07/17/2020    ALBUMIN 3.7 09/25/2024    LIPASE 83 (H) 02/22/2024    GFRF 89 08/06/2023    GFRMALE CANCELED 04/30/2023     Lab Results   Component Value Date    TRIG 272 (H) 04/18/2024    CHOL 204 (H) 04/18/2024    LDLCALC 103 (H) 04/18/2024    HDL 46.5 04/18/2024     Lab Results   Component Value Date    HGBA1C 7.2 (H) 09/20/2024    HGBA1C 7.9 (H) 04/18/2024    HGBA1C 5.9 (H) 11/08/2023       The ASCVD Risk score (Mamie DK, et al., 2019) failed to calculate for the following reasons:    The patient has a prior MI or stroke diagnosis    Assessment/Plan   Problem List Items Addressed This Visit       Type 2 diabetes mellitus with hyperglycemia, with long-term current use of insulin - Primary    Relevant Orders    Follow up in Endocrinology Pharmacy     Other Visit Diagnoses       Adrenal insufficiency (Multi)            Comment: A1c not at goal.  Her time in range is not at goal.  She has been off OmniPod due to skin reaction to the adhesive.  Historically she has had better control over the blood sugars when wearing OmniPod insulin pump.  Now on injections and has consistent  elevation after breakfast.  Will adjust her carb ratio with breakfast.  Does not feel good less than 100 waking up will lower her basal slightly.  Short-term follow-up with Pharm.D. she is weaning the hydrocortisone.  Her baseline was 10 mg once daily.  Will address with Dr. Felix    Plan:   Lower the Toujeo max 52 units once daily   Change Humalog 1: 2 with breakfast, 1:3 with lunch and dinner   Continue 2 units for every 30 over 150.    For now continue hydrocortisone 10 mg in the morning and 5 mg in the afternoon.  I will discuss with Dr. Austin about weaning further   Follow up with Joanna in 2 months and DR. Felix in April as planned

## 2024-10-10 NOTE — PATIENT INSTRUCTIONS
Lower the Toujeo max 52 units once daily     Change Humalog 1: 2 with breakfast, 1:3 with lunch and dinner   Continue 2 units for every 30 over 150.      For now continue hydrocortisone 10 mg in the morning and 5 mg in the afternoon.  I will discuss with Dr. Austin about weaning further     Follow up with Joanna in 2 months and DR. Felix in April as planned

## 2024-10-15 ENCOUNTER — APPOINTMENT (OUTPATIENT)
Dept: AUDIOLOGY | Facility: CLINIC | Age: 49
End: 2024-10-15
Payer: MEDICAID

## 2024-10-15 ENCOUNTER — APPOINTMENT (OUTPATIENT)
Dept: OTOLARYNGOLOGY | Facility: CLINIC | Age: 49
End: 2024-10-15
Payer: MEDICAID

## 2024-10-16 ENCOUNTER — TELEPHONE (OUTPATIENT)
Dept: PRIMARY CARE | Facility: CLINIC | Age: 49
End: 2024-10-16
Payer: MEDICAID

## 2024-10-17 ENCOUNTER — APPOINTMENT (OUTPATIENT)
Dept: NEUROLOGY | Facility: CLINIC | Age: 49
End: 2024-10-17
Payer: MEDICAID

## 2024-10-18 DIAGNOSIS — J45.40 MODERATE PERSISTENT ALLERGIC ASTHMA (HHS-HCC): ICD-10-CM

## 2024-10-18 DIAGNOSIS — R11.0 NAUSEA: ICD-10-CM

## 2024-10-18 RX ORDER — MONTELUKAST SODIUM 10 MG/1
10 TABLET ORAL NIGHTLY
Qty: 90 TABLET | Refills: 0 | Status: SHIPPED | OUTPATIENT
Start: 2024-10-18

## 2024-10-18 RX ORDER — ONDANSETRON 4 MG/1
4 TABLET, ORALLY DISINTEGRATING ORAL EVERY 8 HOURS PRN
Qty: 20 TABLET | Refills: 2 | Status: SHIPPED | OUTPATIENT
Start: 2024-10-18

## 2024-10-18 NOTE — TELEPHONE ENCOUNTER
Jonathan Ayala [id number] [facility] Clinical Support Staff  Phone Number: [phone]     During the time of   filling  my weekly pill strip and going through my powders and creams I’m in need montelukast 10 mg 1tab at bed. ondansetron ODT 4 mg  disintegrating tablet as needed, and nystatin powder to put under my folds and medline aloe vesta clear antifungal ointment. I get pills from [facility] in [facility].  I used to get the creams and powder online but didn’t know if one of the medical supply stores might have it in stock cause I am having financial difficulties right now. Thank you. Jonathan

## 2024-10-22 ENCOUNTER — APPOINTMENT (OUTPATIENT)
Dept: RADIOLOGY | Facility: CLINIC | Age: 49
End: 2024-10-22
Payer: MEDICAID

## 2024-10-22 ENCOUNTER — HOSPITAL ENCOUNTER (OUTPATIENT)
Dept: RADIOLOGY | Facility: CLINIC | Age: 49
End: 2024-10-22
Payer: MEDICAID

## 2024-10-22 ENCOUNTER — TELEPHONE (OUTPATIENT)
Dept: PRIMARY CARE | Facility: CLINIC | Age: 49
End: 2024-10-22
Payer: MEDICAID

## 2024-10-22 NOTE — TELEPHONE ENCOUNTER
I called patient to let her know that the request for the wheelchair and hospital bed were sent over however the recent notes we have they will not accept due to no documentation of the need. When patient has appointment they need to discuss the need for these items.

## 2024-10-23 ENCOUNTER — APPOINTMENT (OUTPATIENT)
Dept: RADIOLOGY | Facility: HOSPITAL | Age: 49
End: 2024-10-23
Payer: MEDICAID

## 2024-10-23 ENCOUNTER — HOSPITAL ENCOUNTER (EMERGENCY)
Facility: HOSPITAL | Age: 49
Discharge: HOME | End: 2024-10-24
Attending: EMERGENCY MEDICINE
Payer: MEDICAID

## 2024-10-23 ENCOUNTER — APPOINTMENT (OUTPATIENT)
Dept: OPHTHALMOLOGY | Facility: CLINIC | Age: 49
End: 2024-10-23
Payer: MEDICAID

## 2024-10-23 ENCOUNTER — SPECIALTY PHARMACY (OUTPATIENT)
Dept: PHARMACY | Facility: CLINIC | Age: 49
End: 2024-10-23

## 2024-10-23 ENCOUNTER — APPOINTMENT (OUTPATIENT)
Dept: NEUROLOGY | Facility: CLINIC | Age: 49
End: 2024-10-23
Payer: MEDICAID

## 2024-10-23 DIAGNOSIS — H47.012 NAION (NON-ARTERITIC ANTERIOR ISCHEMIC OPTIC NEUROPATHY), LEFT EYE: ICD-10-CM

## 2024-10-23 DIAGNOSIS — H47.20 OPTIC ATROPHY: ICD-10-CM

## 2024-10-23 DIAGNOSIS — H46.11 OPTIC NEURITIS, RETROBULBAR (ACUTE), RIGHT: Primary | ICD-10-CM

## 2024-10-23 DIAGNOSIS — H57.09 RELATIVE AFFERENT PUPILLARY DEFECT OF LEFT EYE: ICD-10-CM

## 2024-10-23 DIAGNOSIS — G93.2 BENIGN INTRACRANIAL HYPERTENSION: ICD-10-CM

## 2024-10-23 DIAGNOSIS — H54.61 VISION LOSS OF RIGHT EYE: Primary | ICD-10-CM

## 2024-10-23 DIAGNOSIS — H47.10 EDEMA OF OPTIC DISC OF RIGHT EYE: ICD-10-CM

## 2024-10-23 LAB
ALBUMIN SERPL BCP-MCNC: 4.2 G/DL (ref 3.4–5)
ALP SERPL-CCNC: 87 U/L (ref 33–110)
ALT SERPL W P-5'-P-CCNC: 21 U/L (ref 7–45)
ANION GAP SERPL CALC-SCNC: 13 MMOL/L (ref 10–20)
AST SERPL W P-5'-P-CCNC: 21 U/L (ref 9–39)
BASOPHILS # BLD AUTO: 0.03 X10*3/UL (ref 0–0.1)
BASOPHILS NFR BLD AUTO: 0.6 %
BILIRUB SERPL-MCNC: 0.3 MG/DL (ref 0–1.2)
BUN SERPL-MCNC: 17 MG/DL (ref 6–23)
CALCIUM SERPL-MCNC: 9.7 MG/DL (ref 8.6–10.6)
CHLORIDE SERPL-SCNC: 100 MMOL/L (ref 98–107)
CO2 SERPL-SCNC: 32 MMOL/L (ref 21–32)
CREAT SERPL-MCNC: 0.89 MG/DL (ref 0.5–1.05)
EGFRCR SERPLBLD CKD-EPI 2021: 81 ML/MIN/1.73M*2
EOSINOPHIL # BLD AUTO: 0.04 X10*3/UL (ref 0–0.7)
EOSINOPHIL NFR BLD AUTO: 0.8 %
ERYTHROCYTE [DISTWIDTH] IN BLOOD BY AUTOMATED COUNT: 15.5 % (ref 11.5–14.5)
GLUCOSE SERPL-MCNC: 144 MG/DL (ref 74–99)
HCT VFR BLD AUTO: 41.2 % (ref 36–46)
HGB BLD-MCNC: 12.7 G/DL (ref 12–16)
IMM GRANULOCYTES # BLD AUTO: 0.02 X10*3/UL (ref 0–0.7)
IMM GRANULOCYTES NFR BLD AUTO: 0.4 % (ref 0–0.9)
LYMPHOCYTES # BLD AUTO: 1.64 X10*3/UL (ref 1.2–4.8)
LYMPHOCYTES NFR BLD AUTO: 31.5 %
MAGNESIUM SERPL-MCNC: 2.05 MG/DL (ref 1.6–2.4)
MCH RBC QN AUTO: 28 PG (ref 26–34)
MCHC RBC AUTO-ENTMCNC: 30.8 G/DL (ref 32–36)
MCV RBC AUTO: 91 FL (ref 80–100)
MONOCYTES # BLD AUTO: 0.35 X10*3/UL (ref 0.1–1)
MONOCYTES NFR BLD AUTO: 6.7 %
NEUTROPHILS # BLD AUTO: 3.13 X10*3/UL (ref 1.2–7.7)
NEUTROPHILS NFR BLD AUTO: 60 %
NRBC BLD-RTO: 0 /100 WBCS (ref 0–0)
PLATELET # BLD AUTO: 295 X10*3/UL (ref 150–450)
POTASSIUM SERPL-SCNC: 4 MMOL/L (ref 3.5–5.3)
PROT SERPL-MCNC: 7.5 G/DL (ref 6.4–8.2)
RBC # BLD AUTO: 4.53 X10*6/UL (ref 4–5.2)
SODIUM SERPL-SCNC: 141 MMOL/L (ref 136–145)
WBC # BLD AUTO: 5.2 X10*3/UL (ref 4.4–11.3)

## 2024-10-23 PROCEDURE — 74018 RADEX ABDOMEN 1 VIEW: CPT

## 2024-10-23 PROCEDURE — 74018 RADEX ABDOMEN 1 VIEW: CPT | Mod: FOREIGN READ | Performed by: RADIOLOGY

## 2024-10-23 PROCEDURE — 70450 CT HEAD/BRAIN W/O DYE: CPT | Performed by: RADIOLOGY

## 2024-10-23 PROCEDURE — 99285 EMERGENCY DEPT VISIT HI MDM: CPT | Performed by: EMERGENCY MEDICINE

## 2024-10-23 PROCEDURE — 36415 COLL VENOUS BLD VENIPUNCTURE: CPT

## 2024-10-23 PROCEDURE — 70250 X-RAY EXAM OF SKULL: CPT | Mod: FOREIGN READ | Performed by: RADIOLOGY

## 2024-10-23 PROCEDURE — A9575 INJ GADOTERATE MEGLUMI 0.1ML: HCPCS | Mod: SE | Performed by: EMERGENCY MEDICINE

## 2024-10-23 PROCEDURE — 99215 OFFICE O/P EST HI 40 MIN: CPT | Performed by: PSYCHIATRY & NEUROLOGY

## 2024-10-23 PROCEDURE — 70543 MRI ORBT/FAC/NCK W/O &W/DYE: CPT

## 2024-10-23 PROCEDURE — 96374 THER/PROPH/DIAG INJ IV PUSH: CPT | Mod: 59

## 2024-10-23 PROCEDURE — 70543 MRI ORBT/FAC/NCK W/O &W/DYE: CPT | Performed by: STUDENT IN AN ORGANIZED HEALTH CARE EDUCATION/TRAINING PROGRAM

## 2024-10-23 PROCEDURE — 85025 COMPLETE CBC W/AUTO DIFF WBC: CPT

## 2024-10-23 PROCEDURE — 99285 EMERGENCY DEPT VISIT HI MDM: CPT | Mod: 25

## 2024-10-23 PROCEDURE — 70450 CT HEAD/BRAIN W/O DYE: CPT

## 2024-10-23 PROCEDURE — 2550000001 HC RX 255 CONTRASTS: Mod: SE | Performed by: EMERGENCY MEDICINE

## 2024-10-23 PROCEDURE — 2500000004 HC RX 250 GENERAL PHARMACY W/ HCPCS (ALT 636 FOR OP/ED): Mod: SE

## 2024-10-23 PROCEDURE — 71045 X-RAY EXAM CHEST 1 VIEW: CPT | Mod: FOREIGN READ | Performed by: RADIOLOGY

## 2024-10-23 PROCEDURE — 83735 ASSAY OF MAGNESIUM: CPT

## 2024-10-23 PROCEDURE — 92133 CPTRZD OPH DX IMG PST SGM ON: CPT | Performed by: PSYCHIATRY & NEUROLOGY

## 2024-10-23 PROCEDURE — 80053 COMPREHEN METABOLIC PANEL: CPT

## 2024-10-23 PROCEDURE — 70553 MRI BRAIN STEM W/O & W/DYE: CPT

## 2024-10-23 PROCEDURE — 2500000001 HC RX 250 WO HCPCS SELF ADMINISTERED DRUGS (ALT 637 FOR MEDICARE OP): Mod: SE

## 2024-10-23 PROCEDURE — 70553 MRI BRAIN STEM W/O & W/DYE: CPT | Performed by: STUDENT IN AN ORGANIZED HEALTH CARE EDUCATION/TRAINING PROGRAM

## 2024-10-23 PROCEDURE — 71045 X-RAY EXAM CHEST 1 VIEW: CPT

## 2024-10-23 PROCEDURE — 74018 RADEX ABDOMEN 1 VIEW: CPT | Performed by: STUDENT IN AN ORGANIZED HEALTH CARE EDUCATION/TRAINING PROGRAM

## 2024-10-23 RX ORDER — GADOTERATE MEGLUMINE 376.9 MG/ML
20 INJECTION INTRAVENOUS
Status: COMPLETED | OUTPATIENT
Start: 2024-10-23 | End: 2024-10-23

## 2024-10-23 RX ORDER — LORAZEPAM 2 MG/ML
1 INJECTION INTRAMUSCULAR ONCE
Status: COMPLETED | OUTPATIENT
Start: 2024-10-23 | End: 2024-10-23

## 2024-10-23 RX ORDER — LEVETIRACETAM 500 MG/1
1500 TABLET ORAL ONCE
Status: COMPLETED | OUTPATIENT
Start: 2024-10-23 | End: 2024-10-23

## 2024-10-23 RX ORDER — LACOSAMIDE 100 MG/1
250 TABLET ORAL ONCE
Status: COMPLETED | OUTPATIENT
Start: 2024-10-23 | End: 2024-10-23

## 2024-10-23 ASSESSMENT — LIFESTYLE VARIABLES
EVER HAD A DRINK FIRST THING IN THE MORNING TO STEADY YOUR NERVES TO GET RID OF A HANGOVER: NO
EVER FELT BAD OR GUILTY ABOUT YOUR DRINKING: NO
TOTAL SCORE: 0
HAVE YOU EVER FELT YOU SHOULD CUT DOWN ON YOUR DRINKING: NO
HAVE PEOPLE ANNOYED YOU BY CRITICIZING YOUR DRINKING: NO

## 2024-10-23 ASSESSMENT — ENCOUNTER SYMPTOMS
CARDIOVASCULAR NEGATIVE: 0
GASTROINTESTINAL NEGATIVE: 0
ENDOCRINE NEGATIVE: 0
ALLERGIC/IMMUNOLOGIC NEGATIVE: 0
PSYCHIATRIC NEGATIVE: 0
MUSCULOSKELETAL NEGATIVE: 0
HEMATOLOGIC/LYMPHATIC NEGATIVE: 0
RESPIRATORY NEGATIVE: 0
NEUROLOGICAL NEGATIVE: 0
CONSTITUTIONAL NEGATIVE: 0
EYES NEGATIVE: 1

## 2024-10-23 ASSESSMENT — CONF VISUAL FIELD
METHOD: COUNTING FINGERS
OS_INFERIOR_TEMPORAL_RESTRICTION: 1
OS_INFERIOR_NASAL_RESTRICTION: 1
OD_SUPERIOR_TEMPORAL_RESTRICTION: 1
OS_SUPERIOR_TEMPORAL_RESTRICTION: 1
OD_INFERIOR_TEMPORAL_RESTRICTION: 1
OS_SUPERIOR_NASAL_RESTRICTION: 1
OD_INFERIOR_NASAL_RESTRICTION: 1
OD_SUPERIOR_NASAL_RESTRICTION: 1

## 2024-10-23 ASSESSMENT — SLIT LAMP EXAM - LIDS
COMMENTS: NORMAL
COMMENTS: NORMAL

## 2024-10-23 ASSESSMENT — PAIN - FUNCTIONAL ASSESSMENT: PAIN_FUNCTIONAL_ASSESSMENT: 0-10

## 2024-10-23 ASSESSMENT — VISUAL ACUITY
OD_SC: LP
OS_SC: HM
METHOD: SNELLEN - LINEAR
CORRECTION_TYPE: GLASSES

## 2024-10-23 ASSESSMENT — PAIN SCALES - GENERAL: PAINLEVEL_OUTOF10: 0 - NO PAIN

## 2024-10-23 ASSESSMENT — EXTERNAL EXAM - RIGHT EYE: OD_EXAM: NORMAL

## 2024-10-23 ASSESSMENT — EXTERNAL EXAM - LEFT EYE: OS_EXAM: NORMAL

## 2024-10-23 ASSESSMENT — TONOMETRY
OD_IOP_MMHG: 22
OS_IOP_MMHG: 22
IOP_METHOD: GOLDMANN APPLANATION

## 2024-10-23 ASSESSMENT — CUP TO DISC RATIO
OD_RATIO: 0.15
OS_RATIO: 0.15

## 2024-10-23 NOTE — ED TRIAGE NOTES
R eye vision change, send to ED by the Neurology. Pt state that she is blinb on L eye now going blind on R, pt does have a  shunt

## 2024-10-23 NOTE — CONSULTS
Reason For Consult  New blindness in right eye    History Of Present Illness  Jonathan Ayala is a 48 y.o. 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 Mederos and found to 10 days of total right eye blindness    She reports a decline in vision in the right eye starting 10/13. Initially, vision was reduced to seeing shapes, but after experiencing what felt like a migraine on 10/12 behind the right eye, complete vision was lost upon waking on 10/12. No means of transportation to the hospital was available. There has been no improvement or changes since the vision loss. The patient also reports right-sided ear pain and occasional dizziness, which they attribute to standing up too quickly. Headaches have been mild, rated 2-3/10, and not severe like a typical migraine.       Past Medical History  She has a past medical history of Abnormal findings on diagnostic imaging of other abdominal regions, including retroperitoneum (10/14/2020), Acquired deformity of nose (03/24/2022), Acute upper respiratory infection, unspecified (10/16/2019), Allergic, Allergy status to unspecified drugs, medicaments and biological substances (05/22/2020), Allergy status to unspecified drugs, medicaments and biological substances (11/13/2020), Anemia, Anxiety (2005), Asthma, Benign intracranial hypertension (01/27/2022), Bipolar  disorder, unspecified (Multi), Breast calcification, right (08/21/2018), Cellulitis of abdominal wall (09/28/2022), Cervicalgia (07/01/2020), Chronic maxillary sinusitis (01/04/2022), Chronic sialoadenitis (03/16/2020), COVID-19 (01/06/2022), Decreased white blood cell count, unspecified (11/04/2019), Disease of thyroid gland, Disturbances of salivary secretion (03/16/2020), Dry eye syndrome of bilateral lacrimal glands (10/07/2022), Encounter for preprocedural cardiovascular examination (02/01/2022), Food additives allergy status (06/11/2020), Fracture of nasal bones, initial encounter for closed fracture (03/03/2022), GERD (gastroesophageal reflux disease) (15 years old), Granuloma of right orbit (10/07/2021), History of endometrial ablation (11/09/2017), Hyperlipidemia, Hypertension, Hyperthyroidism, Hypoglycemia, Hypothyroidism, Localized swelling, mass and lump, head (03/24/2022), Major depressive disorder, recurrent, in full remission (CMS-HCC) (10/07/2021), 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), Meningitis ([facility]) (2008), Migraine, Nipple discharge (08/24/2022), Ocular pain, right eye (10/07/2022), Optic atrophy, Other abnormal and inconclusive findings on diagnostic imaging of breast (07/06/2020), Other anomalies of pupillary function (05/31/2019), Other chest pain (05/18/2020), Other conditions influencing health status (08/01/2022), Other conditions influencing health status (08/03/2021), Other specified disorders of eustachian tube, left ear (11/18/2019), Other specified disorders of nose and nasal sinuses (03/24/2022), Other specified disorders of nose and nasal sinuses (03/24/2022), Pelvic and perineal pain (07/06/2020), Personal history of other diseases of the circulatory system (04/07/2020), Personal history of other diseases of the circulatory system (04/07/2020), Personal history of other diseases of the circulatory system, Personal history of other diseases of the digestive system,  Personal history of other diseases of the digestive system (03/02/2020), Personal history of other diseases of the musculoskeletal system and connective tissue (01/19/2022), Personal history of other diseases of the musculoskeletal system and connective tissue (03/02/2021), Personal history of other diseases of the musculoskeletal system and connective tissue (06/16/2020), Personal history of other diseases of the nervous system and sense organs (11/18/2019), Personal history of other diseases of the nervous system and sense organs (09/21/2022), Personal history of other diseases of the respiratory system (04/14/2021), Personal history of other endocrine, nutritional and metabolic disease (02/17/2021), Personal history of other mental and behavioral disorders (05/27/2021), Personal history of other specified conditions (09/07/2022), Personal history of other specified conditions (10/16/2019), Personal history of other specified conditions (09/28/2022), Personal history of other specified conditions (09/16/2021), Personal history of other specified conditions (02/01/2022), Personal history of other specified conditions (03/09/2022), Personal history of other specified conditions (02/12/2014), Personal history of other specified conditions (10/27/2021), Personal history of other specified conditions (10/16/2019), Personal history of other specified conditions (02/26/2021), Personal history of other specified conditions (02/22/2021), Personal history of urinary calculi, Polycystic ovary syndrome, Postural orthostatic tachycardia syndrome (POTS), Rash and other nonspecific skin eruption (03/15/2022), Repeated falls (06/23/2021), Right lower quadrant pain (10/14/2020), Seizures (Multi), Sjogren syndrome, unspecified (Multi), Sjogren's syndrome, Slow transit constipation (07/09/2020), Subarachnoid hemorrhage, traumatic (Multi) (04/19/2023), Thyroid nodule, Traumatic subarachnoid hemorrhage with loss of consciousness of  unspecified duration, subsequent encounter (03/15/2022), Traumatic subarachnoid hemorrhage without loss of consciousness, subsequent encounter, Type 2 diabetes mellitus, Unspecified disorder of refraction (10/07/2022), Unspecified optic neuritis (11/06/2020), Unspecified optic neuritis (11/06/2020), Unspecified visual loss (09/25/2019), Varicella (As a child), Venous insufficiency (chronic) (peripheral) (10/18/2021), Viral infection, unspecified (01/11/2022), Vitamin D deficiency, and Vitamin D deficiency, unspecified (09/28/2022).    Surgical History  She has a past surgical history that includes Other surgical history (08/22/2019); Other surgical history (08/22/2019); Other surgical history (08/22/2019); Other surgical history (08/22/2019); Other surgical history (08/22/2019); Other surgical history (08/22/2019); MR angio neck wo IV contrast (02/08/2021); MR angio head wo IV contrast (02/08/2021); [place] tooth extraction (2004); Appendectomy (2017); Hysterectomy (2017); Hernia repair (Right, 03/01/2024); Cardiac catheterization; Cholecystectomy; Fracture surgery (12/2023); Endometrial ablation; and Brain surgery (Norco placement to measure pressure).     Social History  She reports that she has never smoked. She has never used smokeless tobacco. She reports that she does not currently use alcohol. She reports current drug use. Drug: Benzodiazepines.    Family History  Family History   Problem Relation Name Age of Onset    Other (Perforated bowel) Mother Radha     Hypertension Mother Radha     Macular degeneration Mother Radha     Depression Mother Radha     Hyperlipidemia Mother Radha     Mental illness Mother Radha     Hyperlipidemia Father Mac     Hypertension Father Mac     Heart attack Father Mac     Other (S/P CABG) Father Mac     Diabetes Father Mac     Prostate cancer Father Mac     Coronary artery disease Father Mac     Heart failure Father Mac     Stroke Father Mac     Cancer  Father Mac     Macular degeneration Father Mac     Retinal detachment Father Mac     Asthma Father Mac     Hearing loss Father Mac     Heart disease Father Mac     Hernia Father Mac     Stroke Sister Jazmin     Breast cancer Sister Jazmin     Lupus Sister Jazmin     Ovarian cancer Sister Jazmin     Astigmatism Sister Jazmin     Arthritis Sister Jazmin     Asthma Sister Jazmin     Depression Sister Jazmin     Mental illness Sister Jazmin     Miscarriages / Stillbirths Sister Jazmin     Vision loss Sister Jazmin     Hyperlipidemia Brother Sanchez     Hypertension Brother Sanchez     Astigmatism Brother Sanchez     Arthritis Brother Sanchez     Asthma Brother Sanchez     Diabetes Father's Sister Linda     Blindness Father's Sister Linda     Vision loss Father's Sister Linda     Thyroid cancer Father's Sister Bekah     Thyroid disease Father's Sister Jessica     Colon cancer Father's Brother ?     Cancer Father's Brother Kian     Anesthesia related problems Father's Brother Kian     Depression Father's Brother Kian     Hernia Father's Brother Kian     Mental illness Father's Brother Kian     Cancer Maternal Grandmother Brenda     Ovarian cancer Maternal Grandmother Brenda     Blindness Other Multiple     Melanoma Other      Asthma Other      Other (hay fever) Other      Allergies Other      Alcohol abuse Paternal Grandfather Elkin     Arthritis Sister Isha     Asthma Sister Isha     Cancer Sister Isha     Depression Sister Isha     Mental illness Sister Isha     Miscarriages / Stillbirths Sister Isha     Ovarian cancer Sister Isha     Glaucoma Neg Hx          Allergies  Acetazolamide, Atorvastatin, Cefdinir, Ceftriaxone, Doxepin, Duloxetine, Fd and c red no.40, Levofloxacin, Levofloxacin in d5w, Nutritional supplements, Ozempic [semaglutide], Prochlorperazine, Red dye, Rosemary, Rosemary oil, Strawberry, Sulfa (sulfonamide antibiotics), Topiramate, Tree pollen-black walnut, Tree pollen-pecan, [place], Aripiprazole, Aspartame, Aspartame  "(bulk), Fenofibrate, Gluten, Hydrochlorothiazide, Hydromorphone, Iron, Meclizine, Metformin, Propoxyphene, Statins-hmg-coa reductase inhibitors, Tetracyclines, Thiazides, Venlafaxine, Wheat, Adhesive tape-silicones, Barbiturates, Ciprofloxacin, Dhe, Diet foods, Farxiga [dapagliflozin], Ferrous sulfate, Nsaids (non-steroidal anti-inflammatory drug), Other, Sulfonylureas, Tobramycin, Vancomycin, Adhesive, Azithromycin, Betamethasone, House dust, Metoclopramide hcl, Prednisone, Propoxyphene-acetaminophen, Sulfacetamide sodium, [id number] antimigraine agents, and Zolpidem    Review of Systems  As above     Physical Exam  Ox3  both eyes 5mm minimally reactive, no light perception/total blindness   BUE and BLE 5/5  Shunt incisions are c/d/I (abdominal incisoin have scab on left abdomen)     Last Recorded Vitals  Blood pressure (!) 168/92, pulse (!) 107, temperature 36.6 °C (97.9 °F), resp. rate 15, height 1.575 m (5' 2\"), weight 111 kg (245 lb), SpO2 97%.    Relevant Results  10/23 shunt series with certas at 5     Assessment/Plan     Jonathan Ayala is a 48 y.o. 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 Mederos and found to 10 days of total right eye blindness    10/23 shunt series with certas at 5    Plan  MRI brain, orbit with and without contrast  CTH without contrast  Lateral abdominal xray (rule " out kink)  Further recs pending the above        Dustin Chery MD

## 2024-10-23 NOTE — HOSPITAL COURSE
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) s/p R frontal bolt c/b tract hemorrhage and focal epilepsy, right hemiplegic migraines, CVID on monthly IVIG infusions, Sjogren's syndrome/scleroderma, POTS, T2DM, HTN, hypothyroidism, BRENT on CPAP, anxiety/depression, left non-arteritic anterior ischemic optic neruopathy with bilateral sequential vision loss 9/17 p/w 1 mo R eye blurry vision, MRI brain RF encephalomalaxcia, MR orbit L optic nerve STIR signal, MRV negative, 9/18 s/p LP (OP 52), 9/21 s/p BAT for L hemiplegia, CTH/CTA stable RF encephalomalacia, no LVO, MRI  neg, 9/24/2024 s/p RF  shunt (certas at 5), 10/23 presenting after ophtho clinic with Dr. Mederos and found to have worsened R sided vision for 10 days with improved but present R optic disc edema.       Plan  MRI brain, orbit  CTH  Lateral abdominal xray (rule out kink)  Shunt tap

## 2024-10-23 NOTE — ED PROVIDER NOTES
Emergency Department Provider Note        History of Present Illness     History provided by: Patient  Limitations to History: None    HPI:  Patient is a 48-year-old femalewith a history of left non-arteritic anterior ischemic optic neuropathy, bilateral sequential vision loss with right eye improvement 11/13/2019, idiopathic intracranial hypertension status post ventriculoperitoneal shunt , seizures, scleroderma, Sjogren, CVID on monthly IVIG, POTS, HTN, DM2, hypothyroidism, BRENT on CPAP, migraine presents in follow up for evaluation of an interval visual disturbance.  Patient came from her outpatient appointment where she was being evaluated for loss of vision in her right eye.  Patient was then told come to the ED for imaging.  While in the ED patient states that starting about 10 days ago she began only seeing shapes in her right eye.  Patient states that she then continuously lost vision and not cannot see light shapes or figures out of the right eye.  Patient denies any trauma to the area states that the same thing happened to her left eye in the past.  Patient states some mild pain in the right eye that was then followed by a migraine and states this is how her usual migraines are.  Physical Exam   Triage vitals:  T 36.1 °C (96.9 °F)  HR 82  /77  RR 16  O2 96 % None (Room air)    Physical Exam  Constitutional:       Appearance: Normal appearance.   HENT:      Head: Normocephalic.   Eyes:      General:         Right eye: No discharge.         Left eye: No discharge.      Extraocular Movements: Extraocular movements intact.      Conjunctiva/sclera: Conjunctivae normal.      Comments: Dilated 5 mm bilaterally   Cardiovascular:      Rate and Rhythm: Normal rate.   Pulmonary:      Effort: Pulmonary effort is normal.   Abdominal:      General: Abdomen is flat.   Musculoskeletal:         General: Normal range of motion.      Cervical back: Normal range of motion.   Skin:     General: Skin is warm.    Neurological:      Mental Status: She is alert. Mental status is at baseline.   Psychiatric:         Mood and Affect: Mood normal.         Behavior: Behavior normal.          Medical Decision Making & ED Course   Medical Decision Making:  Patient is a 48-year-old female presenting to the ED due to referral from outpatient service.  Patient saw her neuro-ophthalmologist today and states visual loss in her right eye.  Patient also already has vision loss in the left eye.  Patient was sent in by the neuro-ophthalmologist for an MRI brain with and without contrast and MRI orbits.  Ophthalmology was consulted and states they are aware of the patient.  Patient has a  shunt and a shunt series was obtained that showed a kink in the shunt neurosurgery was therefore consulted.  MRI showed no new processes.  Per neurosurgery lateral abdominal x-ray was ordered for visualization of the shunt.  Patient was signed out pending x-ray.  ----           EKG Independent Interpretation: EKG not obtained        The patient was discussed with the following consultants/services: Neurosurgery, ophthalmology              Disposition   Patient was signed out to oncoming provider pending completion of their work-up.  Please see the next provider's transition of care note for the remainder of the patient's care.     Procedures   Procedures    Patient seen and discussed with ED attending physician.    Josefina Gunter MD  Emergency Medicine       Josefina Gunter MD  Resident  10/23/24 3179

## 2024-10-23 NOTE — PROGRESS NOTES
"Assessment and Plan    06/08/2021 +OKN response OD  09/30/2019 +OKN response OD    09/24/2024 CT head without contrast, which I personally reviewed, shows interval placement of a right frontal approach ventriculoperitoneal shunt.  09/23/2024 CT head without contrast, which I personally reviewed, shows no lesion.  09/21/2024 MRI brain without contrast, which I personally reviewed, shows stable findings.  09/21/2024 CT head without contrast & CTA head & neck, which I personally reviewed, shows the right frontal encephalomalacia.  09/17/2024 MRI brain & orbits with contrast & MRV head, which I personally reviewed, show stable right frontal encephalomalacia.    09/02/2024 CT head without contrast, which I personally reviewed previously, shows right frontal encephalomalacia stable from prior.  08/28/2024 CTA head and neck & CT head without contrast, which I personally reviewed previously, show right frontal encephalomalacia stable from prior.  08/06/2024 CTA head & neck & CT head without contrast, which I personally reviewed previously, show right frontal encephalomalacia stable from prior.  07/09/2024 CT head without contrast, which I personally reviewed previously, shows stable right frontal encephalomalacia.  06/05/2024 MRI orbits with contrast, which I personally reviewed previously, shows right frontal encephalomalacia similar to prior imaging.  11/07/2023 MRI brain without contrast, by report from [facility], shows \"No acute intracranial abnormality including no evidence of an acute or recent brain parenchymal infarct. \"  11/07/2023 CTA head & neck & CT head without contrast, by report from [facility], show \"No acute abnormality of the cervical and intracranial vasculature.\" And \"No evidence of an acute intracranial process.  Focal RIGHT frontal lobe encephalomalacia deep to a sherley hole, new from 02/25/2020. \"  08/01/2023 MRV head, which I personally reviewed previously, shows no lesion.  07/29/2023 MRI " "brain & orbits with contrast, which I personally reviewed previously, shows right frontal encephalomalacia consistent with prior bolt.  Interval head imaging.  10/05/2022 CT head without contrast & CTA head & neck, by report from [facility], show \" 1.   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  Overlying right-sided sherley hole.)  2.  Prominence of the ventricles and sulci for age.  \" and \"1. Similar appearing old areas of infarction involving the right and left frontal lobes extending to the convexities with underlying encephalomalacia at site of prior hemorrhage from 02/13/2022. Overlying right-sided sherley hole. Prominence of the ventricles and sulci for age. No evidence of acute infarction. No active hemorrhage. 2. No significant stenosis internal carotid arteries. 3. No significant stenosis of the intracranial vessels or evidence of aneurysm. 4. Aberrant right subclavian artery behind the esophagus with no dilation of the proximal esophagus.\"  09/25/2022 CT head without contrast, which I personally reviewed previously, shows no lesion.  04/20/2022 CT head without contrast, which I personally reviewed previously, shows right frontal encephalomalacia judged stable by Radiology.  Interval head imaging.  09/10/2021 MRI brain with contrast, which I personally reviewed previously, shows no lesion.  09/09/2021 CTA head & neck, which I personally reviewed previously, shows no lesion.  09/09/2021 CT head without contrast, which I personally reviewed previously, shows no lesion.  08/11/2021 MRI brain & orbits with contrast & MRV head, which I personally reviewed previously, show left optic atrophy with Radiology noting “Punctate focus of enhancement seen along the left 7th-8th cranial nerve bundle at the level of the porus acusticus, indeterminate. Otherwise unremarkable " MRI of the brain.”  Interval head imaging.  06/04/2021 MRI brain & orbits with contrast, which I personally reviewed previously, shows left optic atrophy.  Interval head imaging.  06/29/2017 MRI brain without contrast, which I personally reviewed previously, shows no lesion.  11/22/2007 CT head without contrast, which I personally reviewed previously, shows no lesion.    09/18/2024 lumbar puncture tube 1: RBC 1000, WBC 6, tube 4:  & WBC 5, protein 79 & glucose 154. IgG studies with high albumin and albumin index with high cerebrospinal fluid (CSF) IgG/albumin ratio. Oligoclonal bands negative. Cytology negative. Flow cytometry negative. Encephalopathy autoimmune evaluation negative. Meningitis pathogen panel, HSV PCR, VZV PCR, EBV PCR, toxoplasma PCR, West Nile IgG & IgM, VDRL, fungal smear negative.    08/31/2023 lumbar puncture opening pressure 52 cm water, tube 1: , WBC 0, tube 4: RBC 6 & WBC 0, protein 91 & glucose 71.  08/11/2021 lumbar puncture opening pressure 18 cm water, tube 1: RBC 0, WBC 16 (86% L, 13% M), tube 4: RBC 0 & WBC 16 (92% L, 8% M), protein 71 & glucose 61. Cytology negative. Flow cytometry negative. Oligoclonal bands 0. IgG studies with high CSF IgG & albumin.  08/05/2020 lumbar puncture opening pressure 20 cm water, protein 68, glucose 85. MBP wnl. Oligoclonal bands POSITIVE 4.  12/26/2019 lumbar puncture no opening pressure checked per patient) tube ?: RBC 39, WBC 6 (91% L, 9% M), tube ?: RBC 38, WBC 5, protein 89, glucose 79. (via [employer] from [employer])  11/13/2019 lumbar puncture opening pressure 22 cm water, tube 1: RBC 9, WBC 4, tube 4: RBC 1 & WBC 5, protein 82 & glucose 61. Oligoclonal bands 0. IgG studies with non-specific abnormalities. HSV PCR negative.  09/20/2019 lumbar puncture opening pressure 20 cm water, RBC 1, WBC 7 (96% L, 4% M), protein 94 & glucose 78.  01/31/2015 lumbar puncture RBC 2, WBC 0, protein 104 & glucose 74.    08/18/2024 Electrodiagnostic  testing.  ffERG: Normal (OU).  Responses of L-/M-cones and S-cones: No abnormality can be detected (OU).  Responses of On- and Off-bipolar cells in cone pathway: Normal (OU).  PhNR (RGC function):  OD: Amplitude reduces mildly and implicit time prolongs mildly.  OS: Amplitude reduces moderately and implicit time prolongs mildly.  mfERG: No abnormality can be found (OU).  PVEP:  OD: Normal.  OS: Amplitude reduces severely.  [place]em[place]ield PVEP:  OD: Left-field PVEP amplitude is significantly lower than right-field PVEP.  OS: PVEP of both sides show no detectable components.  07/27/2019 multifocal ERG with mildly reduced central responses.  Pattern VEP with OD borderline latency at 0.25 degrees and OS with severely prolonged latencies and decreased amplitudes.    Lab Results   Component Value Date/Time    SEDRATE 19 08/01/2023 1558    SEDRATE 33 (H) 08/07/2022 0322    SEDRATE 19 05/25/2022 1501    SEDRATE 3 06/05/2020 1110    SEDRATE 6 05/13/2020 1529    SEDRATE 3 03/28/2020 0629    SEDRATE 5 09/16/2019 0507    SEDRATE 8 08/19/2019 1314    CRP 0.90 08/07/2022 0322    CRP 0.35 05/25/2022 1501    CRP 0.34 09/23/2021 0618    CRP 0.71 09/21/2021 0648    CRP 0.14 07/16/2020 0944    CRP 0.10 06/05/2020 1110    CRP <0.10 05/13/2020 1529    CRP <0.10 03/28/2020 0629    CRP 3.64 (A) 11/21/2019 2157    CRP <0.10 08/19/2019 1314      Lab Results   Component Value Date/Time    QNOZQRUC33 383 09/17/2024 0242    RCYNHMVG07 299 02/23/2023 0941    VNGXAGSJ14 709 02/09/2021 0451    JGMQSAWX32 508 12/10/2019 1349    FOLATE >24.0 09/17/2024 0242    RCFOL 951 12/10/2019 1349    HXVN6YL 142 09/17/2024 0250    VTAI Normal 09/17/2024 0242    VITAMINA 0.53 09/17/2024 0242    VTAR 0.02 09/17/2024 0242      Lab Results   Component Value Date/Time    NMOAQP4 Negative 09/17/2024 0242    NMOAQP4 Negative 11/14/2019 1447    MOGFACS Negative 09/17/2024 0242    MOGFACS Negative 11/10/2020 1000    MOGFACS Negative 11/14/2019 1447    ACE 41 09/17/2024 0242       Tuberculosis studies  Lab Results   Component Value Date/Time    TBSIN Negative 09/17/2024 0250    TBSIN Negative 09/22/2021 0430    TBSIN Negative 11/10/2020 1000    TBSIN Negative 11/14/2019 0531      Lab Results   Component Value Date/Time    NQWKQENL63 383 09/17/2024 0242    HHQCOIQH20 299 02/23/2023 0941    SIACFUVY80 709 02/09/2021 0451    GUWSFUBX15 508 12/10/2019 1349    FOLATE >24.0 09/17/2024 0242    RCFOL 951 12/10/2019 1349    YCST7LB 142 09/17/2024 0250    VTAI Normal 09/17/2024 0242    VITAMINA 0.53 09/17/2024 0242    VTAR 0.02 09/17/2024 0242        09/17/2024 syphilis REACTIVE. Treponema confirm & RPR non-reactive (NR). T-SPOT TB negative. Bartonella abs, Lyme PCR, RMSF abs, Toxoplasma IgG & IgM negative.  12/04/2023 ESR 10. CRP <0.3 mg/dL.  10/01/2023 syphilis non-reactive (NR).  09/18/2020 ESR 1. CRP < 0.08 mg/dL (0.8 mg/L).  08/28/2020 HbA1c HIGH 7.0. Lipid panel with LDL 64.  08/05/2020 B12 462. Folate wnl. AQP4 ab negative.  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 Suarez.  07/09/2019 BRETT > 1:640. Anti-centromere HIGH 101 (0-40). scl-70 negative. Chromatin ab IgG, anti-ribosomal ab, anti-Sarahy ab, anti-DNA ab negative.    10/23/2024 OCT RNFL  & OS 41. (Lower OD & stable OS)  10/03/2024 OCT RNFL  & OS 39. (Lower OD & stable OS)  09/16/2024 OCT RNFL  & OS 36. (Interval new elevation OD & stable thinning OS)  07/25/2024 OCT RNFL OD 89 & OS 37. (Decrease, now at baseline of early 2024)  06/26/2024 OCT RNFL OD 95 & OS 41. (Stable)  06/05/2024 OCT RNFL OD 95 & OS 40. (Stable)  OCT macula OD normal foveal contour 264 & OS normal foveal contour 234.  03/15/2024 OCT RNFL OD 92 & OS 36. (Stable)  01/09/2024 OCT RNFL OD 89 & OS 36. (Stable)  02/06/2023 OCT RNFL OD 92 & OS 38. (stable)  OCT macula OD normal foveal contour 255 & OS thinning RNFL miscentered wrt fovea.  10/07/2022 OCT RNFL OD 92 & OS 40. (decreased OD & stable OS)  09/23/2022 OCT RNFL OD 98 &  OS 38. (increased OD & stable OS)  01/27/2022 OCT RNFL OD 88 & OS 37. (stable)  10/06/2021 OCT RNFL OD 93 & OS 39 (stable)..  08/19/2021 OCT RNFL OD 92 & OS 38. (stable)  06/08/2021 OCT RNFL OD 87 & OS 37. (stable)  01/28/2021 OCT RNFL OD 85 & OS 37. (stable)  11/06/2020 OCT RNFL OD 89 & OS 39. (stable)  07/27/2020 OCT RNFL OD 89 & OS 38. (stable)  01/07/2020 OCT RNFL OD 93 & OS 38. (stable to mild increase OD, stable to mild decrease OS)  11/27/2019 OCT RNFL OD 84 & OS 42. (stable to mild OD decrease)  09/30/2019 OCT RNFL OD 89 & OS 41. (stable)  07/18/2019 OCT RNFL OD 90 & OS 39.  OCT macula OD normal foveal contour 256 & OS thinning of RNFL & GCL, but preserved foveal contour 238.    10/03/2024 HVF 24-2 OD fovea 4, generalized depression MD -29.75 & OS stimulus V, fovea 12, central, superior nasal step & inferior arcuate defects.  09/16/2024 HVF 24-2 OD stimulus III, fovea 14, generalized depression MD -31.43 & OS stimulus V, fovea 1, generalized depression with some superior temporal sparing.  07/25/2024 HVF 24-2 OD stimulus V, fovea 39, superior > inferior nasal step & OS stimulus V, fovea 18, generalized depression with some superior temporal sparing (inferior > superior altitudinal.  06/26/2024 HVF 24-2 OD fovea 33, FL 3/15, FP 0%, FN 20%, superior arcuate MD -13.69 & OS stimulus V, fovea 27, generalized depression.  06/05/2024 HVF 24-2 OD fovea 34, FL 2/16, FP 0%, FN 40%, superior altitudinal MD -18.00 & OS stimulus V, fovea 25, generalized depression.  03/15/2024 HVF 24-2 OD fovea 36, wnl MD -1.05 & OS stimulus V, fovea 3, inferior arcuate > superior arcuate.  01/09/2024 HVF 24-2 OD wnl MD -0.94 & OS generalized depression MD -32.10.  02/06/2023 HVF 24-2 OD fovea 36, FL 1/17, FP 0%< FN 15%, scatter MD -7.58 & OS stimulus V, fovea 19, generalized depression.  10/07/2022 HVF 24-2 OD fovea 35, wnl MD -1.26 & OS fovea 5, generalized depression MD -30.62.  09/23/2022 HVF 24-2 OD fovea 38, scatter MD -2.67 & OS  stimulus V, fovea 16, superior arcuate & inferior arcuate.  01/27/2022 HVF 24-2 OD fovea 36, wnl MD -1.67 & OS stimulus V, fovea 28, generalized reduction.  10/06/2021 HVF 24-2 OD fovea 36, scatter MD -4.76 & OS stimulus V, generalized reduction.  08/19/2021 HVF 24-2 OD fovea 39, wnl MD -2.31 & OS stimulus V, fovea 28, generalized depression.  06/08/2021 HVF Stimulus V OD fovea 34, wnl & OS stimulus V, fovea 24, generalized depression.  01/28/2021 HVF 24-2 OD fovea 40, wnl MD -0.63 & OS stimulus V, fovea 28, superior nasal step & inferior arcuate.  11/06/2020 HVF 24-2 OD fovea 32, possible inferior > superior arcuate MD -14.71 & OS stimulus V, fovea 10, generalized depression.  07/27/2020 HVF 24-2 OD fovea 39, wnl MD -2.45 & OS stimulus V, fovea 27, superior & inferior altitudinal.    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    She has worse vision of the right eye. The optic disc edema on the right still is present, but is not as severe. Concerns remain for two main etiologies, non-arteritic anterior ischemic optic neuropathy with spontaneous worsening, which can happen, versus uncontrolled idiopathic intracranial hypertension despite ventriculoperitoneal shunt. The possibility of optic neuritis also is a consideration, as is a cortical cause of vision loss such as stroke. She has had functional vision loss in the past and could have some embellishment beyond clear disease, but she does have definite optic disc edema and vision loss that lead me to exclude functional overlay at this point.    We discussed whether to go to the Emergency  Department for MRI and ventriculoperitoneal shunt evaluation.    Plan    Report to [facility] Emergency Department.  Check MRI brain & orbits with contrast for possible optic neuritis.  Continue furosemide.  Evaluation of ventriculoperitoneal shunt with consultation with Neurosurgery and then later follow up with neurosurgeon Dr. Juancarlos Tafoya.  Vasculopathic risk factor control.    Follow up in 3-4 weeks with OCT (likely no HVF unless improved). (dilated 6/5/2024)

## 2024-10-24 VITALS
HEIGHT: 62 IN | RESPIRATION RATE: 16 BRPM | WEIGHT: 245 LBS | TEMPERATURE: 97.9 F | SYSTOLIC BLOOD PRESSURE: 133 MMHG | BODY MASS INDEX: 45.08 KG/M2 | OXYGEN SATURATION: 95 % | HEART RATE: 88 BPM | DIASTOLIC BLOOD PRESSURE: 89 MMHG

## 2024-10-24 LAB
GLUCOSE BLD MANUAL STRIP-MCNC: 112 MG/DL (ref 74–99)
GLUCOSE BLD MANUAL STRIP-MCNC: 96 MG/DL (ref 74–99)

## 2024-10-24 PROCEDURE — 2500000001 HC RX 250 WO HCPCS SELF ADMINISTERED DRUGS (ALT 637 FOR MEDICARE OP): Mod: SE

## 2024-10-24 PROCEDURE — 82947 ASSAY GLUCOSE BLOOD QUANT: CPT

## 2024-10-24 RX ORDER — ACETAMINOPHEN 325 MG/1
975 TABLET ORAL ONCE
Status: COMPLETED | OUTPATIENT
Start: 2024-10-24 | End: 2024-10-24

## 2024-10-24 NOTE — SIGNIFICANT EVENT
CTH and SS series reviewed, no obvious cause of shunt failure, and MRI reviewed with no obvious etiology causing new R eye blindness. As patient has had vision loss for 10 days, which could be related to non arteritic anterior ischemic optic neuropathy vs IIH, would not recommend further work up shunt with invasive procedures as patient has no further vision to salvage. Ok for discharge per ED, patient has follow up with Dr. Tafoya on 10/28.    Jesus Lambert MD  PGY-2 Neurosurgery  1:02 AM

## 2024-10-24 NOTE — DISCHARGE INSTRUCTIONS
You were seen in the emergency department for right eye vision loss.  Your MRIs look stable from previous and the evaluation of your  shunt showed no kinking or blockage of flow.  A follow-up appointment has been set for you with neurosurgery on Monday, 10/28.  You should also follow-up with your ophthalmology team in 3 to 4 weeks.  Dr. Mederos recommended continuing your furosemide at home.  Please come back to the emergency department if you have worsening headaches, nausea/vomiting, further changes in vision, or any new concerns.

## 2024-10-24 NOTE — PROGRESS NOTES
Emergency Department Transition of Care Note       Signout   I received Jonathan Ayala in signout from Dr. Gunter.  Please see the ED Provider Note for all HPI, PE and MDM up to the time of signout at 2300.  This is in addition to the primary record.    In brief Jonathan Ayala is an 48 y.o. female with PMHx left non-arteritic anterior ischemic optic neuropathy, bilateral sequential vision loss with right eye improvement 11/13/2019, idiopathic intracranial hypertension status post ventriculoperitoneal shunt , seizures, scleroderma, Sjogren, CVID on monthly IVIG, POTS, HTN, DM2, hypothyroidism, BRENT on CPAP, migraine who was sent to ED from neuro-ophthalmology appointment for new R eye vision loss x10 days.     Prior to my shift, MRI result similar to previous by MR shunt series with c/f kink in abdominal portion of shunt.  Lateral abd XR ordered    At the time of signout we were awaiting:  Neurology recs, ophthalmology recs    ED Course & Medical Decision Making   Medical Decision Making:  On my evaluation, patient resting comfortably.  Discussed with neurosurgery, who recommends no acute intervention.  Per their review, no concern for  shunt failure and MRI without obvious etiology of new onset right eye blindness.  Per neurosurgery there is concern for nonarteritic anterior ischemic optic neuropathy versus IIH, however did not recommend additional invasive procedures with shunt given the duration of the patient's symptoms and lack of vision to salvage.  Discussed with ophthalmology, and as MR imaging is negative for optic neuritis, ophthalmology recommended no acute intervention.    Discussed with the findings and recommendations with the patient, who expressed understanding.  I recommended that she follow-up with with neurosurgery (Dr. Tafoya) on Monday 10/28 as previously scheduled.  Also recommended that she follow-up with neuro-ophthalmology in 3 to 4 weeks.  Advised her to return to care if she has  fever/chills, worsening headaches, nausea/vomiting, further changes in her vision, or any new concerns.  Patient was discharged home in stable condition.    ED Course:  Diagnoses as of 10/24/24 0735   Vision loss of right eye       Disposition   As a result of the work-up, the patient was discharged home.  she was informed of her diagnosis and instructed to come back with any concerns or worsening of condition.  she and was agreeable to the plan as discussed above.  she was given the opportunity to ask questions.  All of the patient's questions were answered.      Patient seen and discussed with ED attending physician.    Brittney Poe MD  Emergency Medicine

## 2024-10-25 ENCOUNTER — HOSPITAL ENCOUNTER (INPATIENT)
Facility: HOSPITAL | Age: 49
LOS: 1 days | Discharge: HOME | End: 2024-10-26
Attending: STUDENT IN AN ORGANIZED HEALTH CARE EDUCATION/TRAINING PROGRAM | Admitting: NEUROLOGICAL SURGERY
Payer: MEDICAID

## 2024-10-25 DIAGNOSIS — H53.9 VISION CHANGES: ICD-10-CM

## 2024-10-25 DIAGNOSIS — G93.2 IDIOPATHIC INTRACRANIAL HYPERTENSION: ICD-10-CM

## 2024-10-25 DIAGNOSIS — H54.7 VISION LOSS: Primary | ICD-10-CM

## 2024-10-25 LAB
ABO GROUP (TYPE) IN BLOOD: NORMAL
ALBUMIN SERPL BCP-MCNC: 3.7 G/DL (ref 3.4–5)
ALP SERPL-CCNC: 79 U/L (ref 33–110)
ALT SERPL W P-5'-P-CCNC: 28 U/L (ref 7–45)
ANION GAP SERPL CALC-SCNC: 16 MMOL/L (ref 10–20)
ANTIBODY SCREEN: NORMAL
APTT PPP: 148 SECONDS (ref 27–38)
APTT PPP: 27 SECONDS (ref 27–38)
APTT PPP: 47 SECONDS (ref 27–38)
AST SERPL W P-5'-P-CCNC: 21 U/L (ref 9–39)
BASOPHILS # BLD AUTO: 0.03 X10*3/UL (ref 0–0.1)
BASOPHILS NFR BLD AUTO: 0.6 %
BILIRUB SERPL-MCNC: 0.2 MG/DL (ref 0–1.2)
BUN SERPL-MCNC: 20 MG/DL (ref 6–23)
CALCIUM SERPL-MCNC: 8.8 MG/DL (ref 8.6–10.6)
CHLORIDE SERPL-SCNC: 102 MMOL/L (ref 98–107)
CO2 SERPL-SCNC: 23 MMOL/L (ref 21–32)
CREAT SERPL-MCNC: 0.79 MG/DL (ref 0.5–1.05)
EGFRCR SERPLBLD CKD-EPI 2021: >90 ML/MIN/1.73M*2
EOSINOPHIL # BLD AUTO: 0.04 X10*3/UL (ref 0–0.7)
EOSINOPHIL NFR BLD AUTO: 0.8 %
ERYTHROCYTE [DISTWIDTH] IN BLOOD BY AUTOMATED COUNT: 15.3 % (ref 11.5–14.5)
GLUCOSE BLD MANUAL STRIP-MCNC: 83 MG/DL (ref 74–99)
GLUCOSE SERPL-MCNC: 195 MG/DL (ref 74–99)
HCT VFR BLD AUTO: 33.7 % (ref 36–46)
HGB BLD-MCNC: 11.7 G/DL (ref 12–16)
IMM GRANULOCYTES # BLD AUTO: 0.01 X10*3/UL (ref 0–0.7)
IMM GRANULOCYTES NFR BLD AUTO: 0.2 % (ref 0–0.9)
INR PPP: 0.9 (ref 0.9–1.1)
INR PPP: 1 (ref 0.9–1.1)
INR PPP: 1 (ref 0.9–1.1)
LYMPHOCYTES # BLD AUTO: 1.31 X10*3/UL (ref 1.2–4.8)
LYMPHOCYTES NFR BLD AUTO: 26.1 %
MCH RBC QN AUTO: 29.4 PG (ref 26–34)
MCHC RBC AUTO-ENTMCNC: 34.7 G/DL (ref 32–36)
MCV RBC AUTO: 85 FL (ref 80–100)
MONOCYTES # BLD AUTO: 0.4 X10*3/UL (ref 0.1–1)
MONOCYTES NFR BLD AUTO: 8 %
NEUTROPHILS # BLD AUTO: 3.23 X10*3/UL (ref 1.2–7.7)
NEUTROPHILS NFR BLD AUTO: 64.3 %
NRBC BLD-RTO: 0 /100 WBCS (ref 0–0)
PLATELET # BLD AUTO: 266 X10*3/UL (ref 150–450)
POTASSIUM SERPL-SCNC: 3.9 MMOL/L (ref 3.5–5.3)
PROT SERPL-MCNC: 7.5 G/DL (ref 6.4–8.2)
PROTHROMBIN TIME: 10.4 SECONDS (ref 9.8–12.8)
PROTHROMBIN TIME: 10.8 SECONDS (ref 9.8–12.8)
PROTHROMBIN TIME: 10.9 SECONDS (ref 9.8–12.8)
RBC # BLD AUTO: 3.98 X10*6/UL (ref 4–5.2)
RH FACTOR (ANTIGEN D): NORMAL
SODIUM SERPL-SCNC: 137 MMOL/L (ref 136–145)
UFH PPP CHRO-ACNC: <0.1 IU/ML
WBC # BLD AUTO: 5 X10*3/UL (ref 4.4–11.3)

## 2024-10-25 PROCEDURE — 85610 PROTHROMBIN TIME: CPT | Performed by: INTERNAL MEDICINE

## 2024-10-25 PROCEDURE — 85520 HEPARIN ASSAY: CPT

## 2024-10-25 PROCEDURE — 99255 IP/OBS CONSLTJ NEW/EST HI 80: CPT | Performed by: STUDENT IN AN ORGANIZED HEALTH CARE EDUCATION/TRAINING PROGRAM

## 2024-10-25 PROCEDURE — 2500000004 HC RX 250 GENERAL PHARMACY W/ HCPCS (ALT 636 FOR OP/ED): Mod: SE

## 2024-10-25 PROCEDURE — 2500000001 HC RX 250 WO HCPCS SELF ADMINISTERED DRUGS (ALT 637 FOR MEDICARE OP): Mod: SE

## 2024-10-25 PROCEDURE — 85610 PROTHROMBIN TIME: CPT

## 2024-10-25 PROCEDURE — 99285 EMERGENCY DEPT VISIT HI MDM: CPT | Performed by: EMERGENCY MEDICINE

## 2024-10-25 PROCEDURE — 62270 DX LMBR SPI PNXR: CPT

## 2024-10-25 PROCEDURE — 85025 COMPLETE CBC W/AUTO DIFF WBC: CPT

## 2024-10-25 PROCEDURE — 86901 BLOOD TYPING SEROLOGIC RH(D): CPT

## 2024-10-25 PROCEDURE — 82947 ASSAY GLUCOSE BLOOD QUANT: CPT

## 2024-10-25 PROCEDURE — 36415 COLL VENOUS BLD VENIPUNCTURE: CPT

## 2024-10-25 PROCEDURE — 99285 EMERGENCY DEPT VISIT HI MDM: CPT | Mod: 25

## 2024-10-25 PROCEDURE — 96375 TX/PRO/DX INJ NEW DRUG ADDON: CPT

## 2024-10-25 PROCEDURE — 80053 COMPREHEN METABOLIC PANEL: CPT

## 2024-10-25 PROCEDURE — 009U3ZX DRAINAGE OF SPINAL CANAL, PERCUTANEOUS APPROACH, DIAGNOSTIC: ICD-10-PCS

## 2024-10-25 RX ORDER — LORAZEPAM 2 MG/ML
1 INJECTION INTRAMUSCULAR ONCE
Status: COMPLETED | OUTPATIENT
Start: 2024-10-25 | End: 2024-10-25

## 2024-10-25 RX ORDER — LIDOCAINE HYDROCHLORIDE 10 MG/ML
INJECTION, SOLUTION INFILTRATION; PERINEURAL
Status: DISPENSED
Start: 2024-10-25 | End: 2024-10-26

## 2024-10-25 RX ORDER — METHOCARBAMOL 100 MG/ML
1000 INJECTION, SOLUTION INTRAMUSCULAR; INTRAVENOUS ONCE
Status: COMPLETED | OUTPATIENT
Start: 2024-10-25 | End: 2024-10-25

## 2024-10-25 RX ORDER — LACOSAMIDE 100 MG/1
250 TABLET ORAL ONCE
Status: COMPLETED | OUTPATIENT
Start: 2024-10-25 | End: 2024-10-25

## 2024-10-25 RX ORDER — LEVETIRACETAM 500 MG/1
1500 TABLET ORAL ONCE
Status: COMPLETED | OUTPATIENT
Start: 2024-10-25 | End: 2024-10-25

## 2024-10-25 RX ORDER — ONDANSETRON HYDROCHLORIDE 2 MG/ML
4 INJECTION, SOLUTION INTRAVENOUS ONCE
Status: COMPLETED | OUTPATIENT
Start: 2024-10-25 | End: 2024-10-25

## 2024-10-25 RX ORDER — LIDOCAINE HYDROCHLORIDE 10 MG/ML
10 INJECTION, SOLUTION EPIDURAL; INFILTRATION; INTRACAUDAL; PERINEURAL ONCE
Status: COMPLETED | OUTPATIENT
Start: 2024-10-25 | End: 2024-10-25

## 2024-10-25 RX ADMIN — ONDANSETRON 4 MG: 2 INJECTION INTRAMUSCULAR; INTRAVENOUS at 20:09

## 2024-10-25 RX ADMIN — LACOSAMIDE 250 MG: 100 TABLET, FILM COATED ORAL at 21:27

## 2024-10-25 RX ADMIN — METHOCARBAMOL 1000 MG: 100 INJECTION INTRAMUSCULAR; INTRAVENOUS at 20:09

## 2024-10-25 RX ADMIN — LIDOCAINE HYDROCHLORIDE 100 MG: 10 INJECTION, SOLUTION EPIDURAL; INFILTRATION; INTRACAUDAL; PERINEURAL at 22:46

## 2024-10-25 RX ADMIN — LEVETIRACETAM 1500 MG: 500 TABLET, FILM COATED ORAL at 21:26

## 2024-10-25 RX ADMIN — LORAZEPAM 1 MG: 2 INJECTION, SOLUTION INTRAMUSCULAR; INTRAVENOUS at 21:26

## 2024-10-25 ASSESSMENT — PAIN SCALES - GENERAL: PAINLEVEL_OUTOF10: 5 - MODERATE PAIN

## 2024-10-25 ASSESSMENT — PAIN DESCRIPTION - LOCATION: LOCATION: ABDOMEN

## 2024-10-25 ASSESSMENT — PAIN - FUNCTIONAL ASSESSMENT: PAIN_FUNCTIONAL_ASSESSMENT: 0-10

## 2024-10-25 NOTE — ED PROVIDER NOTES
Emergency Department Provider Note        History of Present Illness     History provided by: Patient  Limitations to History: None  External Records Reviewed with Brief Summary:  none    HPI:  Jonathan Ayala is a 48 y.o. 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  Patient states she was seen here earlier this week, she has had right eye vision loss for over a week.  She states prior to this she had limited vision in the right eye so was difficult to tell and vision loss came on slowly.  Patient states she has been blind in the left eye since 2018.  Otherwise has no complaints, she states that she does have chronic right abdominal pain where she believes her shunt has been placed, is unsure if this is related otherwise denies nausea, vomiting, constipation.  Last bowel movement was yesterday which was normal    Physical Exam   Triage vitals:  T 36.7 °C (98 °F)  HR (!) 105  /82  RR 16  O2 95 % None (Room air)    General: Awake, alert, in no acute distress  Eyes: Gaze conjugate.  No scleral icterus or injection.  No vision in bilateral eyes.  Pupils equal round at 4mm and nonreactive  HENT: Normo-cephalic, atraumatic. No stridor  CV: Regular rate, regular rhythm. Radial pulses 2+ bilaterally  Resp: Breathing non-labored, speaking in full sentences.  Clear to auscultation bilaterally  GI: Soft, non-distended, non-tender. No rebound or guarding.  MSK/Extremities: No gross bony deformities. Moving all extremities  Skin: Warm. Appropriate color  Neuro: Alert. Oriented. Face symmetric. Speech is fluent.  Gross strength and sensation intact in b/l UE and  LEs  Psych: Appropriate mood and affect    Medical Decision Making & ED Course   Medical Decision Makin y.o. female with a complex past medical history presenting as a referral from neurosurgery for lumbar puncture.  Arrival she is hemodynamically stable, afebrile, saturating well on room air no acute distress.  Neurosurgery was consulted after my evaluation of the patient and requested CBC, RFP, coags and type and screen which were ordered as they are planning a lumbar puncture for evaluation of shunt malfunction.  Coagulation screen was abnormal, this was repeated per neurosurgery and hematology was consulted per neurosurgery for clearance prior to lumbar puncture.  Patient was signed out to the oncoming provider pending neurosurgery ability to perform a lumbar puncture.  ----      Differential diagnoses considered include but are not limited to: shunt malfunction     Social Determinants of Health which Significantly Impact Care: None identified     EKG Independent Interpretation: EKG not obtained    Independent Result Review and Interpretation: Relevant laboratory and radiographic results were reviewed and independently interpreted by myself.  As necessary, they are commented on in the ED Course.    Chronic conditions affecting the patient's care: As documented above in [facility]    The patient was discussed with the following consultants/services:  Neurosurgery regarding lumbar puncture    Care Considerations: As documented above in [facility]    ED Course:  ED Course as of 10/26/24 1342   Fri Oct 25, 2024   1246 Attending summary:  47 y/o F with extensive PMH most relevant for ** presenting for right vision loss. Seen in ED on 10/23 with negative MRI and ophtho exams. Neurosurg saw and cleared for dc. She was called in today by neurosurg bc they told her she needs an LP. R vision loss has been about 10 days now, cannot see out of that eye. No pain. Vitals show mild tachycardia, otherwise unremarkable. Normal external  eye exam. She had shunt evaluated 10/23 without concern for malfunction- I imagine this is why they wanted the LP. She has no indication for emergent LP without signs or symptoms of meningitis. Will consult neurosurgery, dispo pending their recs.  [SS]   1552 Patient signed out to me at 1500, I touched base with neurosurgery team who states they have discussed with hematology team and awaiting clearance, hematology is aware of patient.  Patient's disposition pending hematology clearance and lumbar puncture by neurosurgery team [SA]   1751 After discussion with hematology and NSGY, plan to repeat anti-Xa and PTT and based on this perform LP today or admit for workup and LP clearance [SA]   2045 Repeat labs normalized, NSGY paged for LP [SA]      ED Course User Index  [SA] Tiffany Alvarez DO  [SS] Theresa Mary MD         Diagnoses as of 10/26/24 1342   Vision loss     Disposition   Patient was signed out to Dr. Newell at 1500 pending completion of their work-up.  Please see the next provider's transition of care note for the remainder of the patient's care.     Procedures   Procedures    Patient seen and discussed with ED attending physician.    Beti Cheng DO  Emergency Medicine       Beti Cheng DO  Resident  10/26/24 1343

## 2024-10-25 NOTE — PROGRESS NOTES
I received this patient during signout at 1500.   Please see previous provider's note for detailed H&P, labs and imaging.      Under my care, patient reassessed and remains clinically stable. See ED course below.    @  ED Course as of 10/25/24 2203   Fri Oct 25, 2024   1246 Attending summary:  49 y/o F with extensive PMH most relevant for ** presenting for right vision loss. Seen in ED on 10/23 with negative MRI and ophtho exams. Neurosurg saw and cleared for dc. She was called in today by neurosurg bc they told her she needs an LP. R vision loss has been about 10 days now, cannot see out of that eye. No pain. Vitals show mild tachycardia, otherwise unremarkable. Normal external eye exam. She had shunt evaluated 10/23 without concern for malfunction- I imagine this is why they wanted the LP. She has no indication for emergent LP without signs or symptoms of meningitis. Will consult neurosurgery, dispo pending their recs.  [SS]   1552 Patient signed out to me at 1500, I touched base with neurosurgery team who states they have discussed with hematology team and awaiting clearance, hematology is aware of patient.  Patient's disposition pending hematology clearance and lumbar puncture by neurosurgery team [SA]   1751 After discussion with hematology and NSGY, plan to repeat anti-Xa and PTT and based on this perform LP today or admit for workup and LP clearance [SA]   2045 Repeat labs normalized, NSGY paged for LP [SA]      ED Course User Index  [SA] Tiffany Alvarez DO  [SS] Theresa Mary MD         Diagnoses as of 10/25/24 2203   Vision loss   @    Disposition: Patient was signed out at 2300 pending completion of their work-up.  Please see the next provider's transition of care note for the remainder of the patient's care.       Patient seen and staffed with attending physician.     Tiffany Alvarez DO   EM PGY3

## 2024-10-25 NOTE — CONSULTS
Consults  Date of Service:  10/25/2024 Attending Provider:  Theresa Mary MD     Reason for Consultation:  Jonathan Ayala is being seen today for a consult requested by Theresa Mary MD for blurry vision.    Subjective   History of Present Illness:  Jonathan is a 48 y.o. female with No Principal Problem: There is no principal problem currently on the Problem List. Please update the Problem List and refresh.    Patient continues to have vision loss from previous visit.  Patient denied any weakness, numbness,  fevers or chills.    Review of Systems negative other than listed in HPI.    Objective   Vitals:  Vitals:    10/25/24 1019   BP: 135/82   Pulse: (!) 105   Resp: 16   Temp: 36.7 °C (98 °F)   SpO2: 95%         Exam:  Constitutional: No acute distress  Resp: breathing comfortably  Cardio: well perfused  GI: nondistended  MSK: full range of motion  Neuro: Awake, Ox3  face symmetric  RUE 5/5  LUE 5/5  RLE 5/5  LLE 5/5  sensation intact to light touch  Psych: appropriate  Skin: no obvious lesions    Medical History  Past Medical History:   Diagnosis Date    Abnormal findings on diagnostic imaging of other abdominal regions, including retroperitoneum 10/14/2020    Abnormal CT of the abdomen    Acquired deformity of nose 03/24/2022    Nasal deformity    Acute upper respiratory infection, unspecified 10/16/2019    Acute URI    Allergic     Allergy status to unspecified drugs, medicaments and biological substances 05/22/2020    History of drug allergy    Allergy status to unspecified drugs, medicaments and biological substances 11/13/2020    History of adverse drug reaction    Anemia     Anxiety 2005    Asthma     Benign intracranial hypertension 01/27/2022    Pseudotumor cerebri    Bipolar disorder, unspecified (Multi)     Bipolar disease, chronic    Breast calcification, right 08/21/2018    Cellulitis of abdominal wall 09/28/2022    Cellulitis of right abdominal wall    Cervicalgia 07/01/2020    Cervicalgia of  [id number] region    Chronic maxillary sinusitis 01/04/2022    Chronic maxillary sinusitis    Chronic sialoadenitis 03/16/2020    Chronic sialoadenitis    COVID-19 01/06/2022    COVID-19 with multiple comorbidities    Decreased white blood cell count, unspecified 11/04/2019    Leukopenia    Disease of thyroid gland     Disturbances of salivary secretion 03/16/2020    Xerostomia    Dry eye syndrome of bilateral lacrimal glands 10/07/2022    Dry eyes, bilateral    Encounter for preprocedural cardiovascular examination 02/01/2022    Preoperative cardiovascular examination    Food additives allergy status 06/11/2020    Allergy to food dye    Fracture of nasal bones, initial encounter for closed fracture 03/03/2022    Closed fracture of nasal bone, initial encounter    GERD (gastroesophageal reflux disease) 15 years old    Granuloma of right orbit 10/07/2021    Inflammatory pseudotumor of right orbit    History of endometrial ablation 11/09/2017    Hyperlipidemia     Hypertension     Hyperthyroidism     Hypoglycemia     Hypothyroidism     Localized swelling, mass and lump, head 03/24/2022    Swollen nose    Major depressive disorder, recurrent, in full remission (CMS-HCC) 10/07/2021    Depression, major, recurrent, in complete remission    Mammary duct ectasia of left breast 08/24/2022    Periductal mastitis of left breast    Meningitis ([facility]) 2008    Migraine     Nipple discharge 08/24/2022    Bloody discharge from left nipple    Ocular pain, right eye 10/07/2022    Pain in right eye    Optic atrophy     Other abnormal and inconclusive findings on diagnostic imaging of breast 07/06/2020    Other abnormal and inconclusive findings on diagnostic imaging of breast    Other anomalies of pupillary function 05/31/2019    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    Other chest pain 05/18/2020    Chest discomfort    Other conditions influencing health status 08/01/2022    History of cough    Other  conditions influencing health status 08/03/2021    Chronic migraine    Other specified disorders of eustachian tube, left ear 11/18/2019    ETD (Eustachian tube dysfunction), left    Other specified disorders of nose and nasal sinuses 03/24/2022    Nasal dryness    Other specified disorders of nose and nasal sinuses 03/24/2022    Nasal crusting    Pelvic and perineal pain 07/06/2020    Pelvic pain    Personal history of other diseases of the circulatory system 04/07/2020    History of sinus tachycardia    Personal history of other diseases of the circulatory system 04/07/2020    History of abnormal electrocardiography    Personal history of other diseases of the circulatory system     History of Raynaud's syndrome    Personal history of other diseases of the digestive system     History of irritable bowel syndrome    Personal history of other diseases of the digestive system 03/02/2020    History of oral pain    Personal history of other diseases of the musculoskeletal system and connective tissue 01/19/2022    History of neck pain    Personal history of other diseases of the musculoskeletal system and connective tissue 03/02/2021    History of scleroderma    Personal history of other diseases of the musculoskeletal system and connective tissue 06/16/2020    History of muscle weakness    Personal history of other diseases of the nervous system and sense organs 11/18/2019    History of hearing loss    Personal history of other diseases of the nervous system and sense organs 09/21/2022    History of partial seizures    Personal history of other diseases of the respiratory system 04/14/2021    History of asthma    Personal history of other endocrine, nutritional and metabolic disease 02/17/2021    History of diabetes mellitus    Personal history of other mental and behavioral disorders 05/27/2021    History of anxiety    Personal history of other specified conditions 09/07/2022    History of nipple discharge     Personal history of other specified conditions 10/16/2019    History of headache    Personal history of other specified conditions 09/28/2022    History of lump of left breast    Personal history of other specified conditions 09/16/2021    History of persistent cough    Personal history of other specified conditions 02/01/2022    History of palpitations    Personal history of other specified conditions 03/09/2022    History of headache    Personal history of other specified conditions 02/12/2014    History of chest pain    Personal history of other specified conditions 10/27/2021    History of nausea and vomiting    Personal history of other specified conditions 10/16/2019    History of fatigue    Personal history of other specified conditions 02/26/2021    History of orthopnea    Personal history of other specified conditions 02/22/2021    History of shortness of breath    Personal history of urinary calculi     H/O renal calculi    Polycystic ovary syndrome     Postural orthostatic tachycardia syndrome (POTS)     POTS (postural orthostatic tachycardia syndrome)    Rash and other nonspecific skin eruption 03/15/2022    Rash    Repeated falls 06/23/2021    Recurrent falls    Right lower quadrant pain 10/14/2020    Abdominal pain, RLQ (right lower quadrant)    Seizures (Multi)     Sjogren syndrome, unspecified (Multi)     History of Sjogren's disease    Sjogren's syndrome     Slow transit constipation 07/09/2020    Slow transit constipation    Subarachnoid hemorrhage, traumatic (Multi) 04/19/2023    Thyroid nodule     Traumatic subarachnoid hemorrhage with loss of consciousness of unspecified duration, subsequent encounter 03/15/2022    Subarachnoid hemorrhage following injury, with loss of consciousness, subsequent encounter    Traumatic subarachnoid hemorrhage without loss of consciousness, subsequent encounter     Subarachnoid hemorrhage following injury, no loss of consciousness, subsequent encounter    Type 2  diabetes mellitus     Unspecified disorder of refraction 10/07/2022    Refractive error    Unspecified optic neuritis 11/06/2020    Right optic neuritis    Unspecified optic neuritis 11/06/2020    Optic neuritis, right    Unspecified visual loss 09/25/2019    Vision loss    Varicella As a child    Venous insufficiency (chronic) (peripheral) 10/18/2021    Chronic venous insufficiency of lower extremity    Viral infection, unspecified 01/11/2022    Nonspecific syndrome suggestive of viral illness    Vitamin D deficiency     Vitamin D deficiency, unspecified 09/28/2022    Vitamin D deficiency       Surgical History  Past Surgical History:   Procedure Laterality Date    APPENDECTOMY  2017    BRAIN SURGERY  Blair placement to measure pressure    CARDIAC CATHETERIZATION      CHOLECYSTECTOMY      ENDOMETRIAL ABLATION      FRACTURE SURGERY  12/2023    HERNIA REPAIR Right 03/01/2024    with mesh    HYSTERECTOMY  2017    MR HEAD ANGIO WO IV CONTRAST  02/08/2021    MR HEAD ANGIO WO IV CONTRAST 2/8/2021 [facility]    MR NECK ANGIO WO IV CONTRAST  02/08/2021    MR NECK ANGIO WO IV CONTRAST 2/8/2021 [facility]    OTHER SURGICAL HISTORY  08/22/2019    Carpal tunnel surgery    OTHER SURGICAL HISTORY  08/22/2019    Hysterectomy    OTHER SURGICAL HISTORY  08/22/2019    Venous access port placement    OTHER SURGICAL HISTORY  08/22/2019    Cholecystectomy    OTHER SURGICAL HISTORY  08/22/2019    Appendectomy    OTHER SURGICAL HISTORY  08/22/2019    Pyloroplasty    WISDOM TOOTH EXTRACTION  2004        Medications  Current Outpatient Medications   Medication Instructions    acetaminophen (TYLENOL) 325-650 mg, Every 6 hours PRN    Advair -21 mcg/actuation inhaler INHALE TWO PUFFS BY MOUTH AS INSTRUCTED TWO TIMES A DAY.    amitriptyline (ELAVIL) 100 mg, oral, Nightly    amLODIPine (NORVASC) 5 mg, oral, Daily    azelastine (Astelin) 137 mcg (0.1 %) nasal spray 1 spray, 2 times daily    BD Ultra-Fine Mini Pen Needle  "31 gauge x 3/16\" needle USE AS DIRECTED FIVE TIMES A DAY.    blood-glucose sensor (DEXCOM G7 SENSOR MISC) Use to check blood sugar continuously throughout the day as directed. Change sensor every 10 days.    calcium-vitamin D3-vitamin K (Viactiv) 650 mg-12.5 mcg-40 mcg chewable tablet 2 tablets, Daily    carboxymethylcellulose (Refresh Celluvisc) 1 % ophthalmic solution dropperette 2 drops, 3 times daily PRN    cholecalciferol (Vitamin D-3) 5,000 Units tablet 1 tablet, Daily    clonazePAM (KlonoPIN) 0.5 mg tablet 1 tablet, 2 times daily    Dexcom G7  misc Use as instructed to check blood sugars continuously throughout the day    diclofenac (VOLTAREN) 50 mg, oral, 3 times daily PRN, 30 day supply    diphenhydrAMINE (BENADryl) 12.5 mg/5 mL liquid 10-20 mL, Daily PRN    divalproex (Depakote ER) 500 mg 24 hr tablet 1 tablet, 2 times daily    EPINEPHrine 0.3 mg/0.3 mL injection syringe INJECT INTRAMUSCULARLY ONCE AS NEEDED FOR ANAPHYLAXIS    ezetimibe (ZETIA) 10 mg, oral, Daily    fluticasone (Flonase) 50 mcg/actuation nasal spray USE 1 SPRAY INTO EACH NOSTRIL ONCE DAILY.    folic acid (FOLVITE) 1 mg, oral, Daily    furosemide (LASIX) 20 mg, oral, 2 times daily    gloves, latex with aloe vera misc Large size gloves    glucagon (Baqsimi) 3 mg/actuation spray,non-aerosol USE ONE SPRAY IN THE NOSE AS NEEDED FOR LOW BLOOD SUGAR  MAY REPEAT AFTER 15 MINUTES USING A NEW DEVICE IF NO RESPONSE    hydrocortisone (CORTEF) 10 mg, oral, 2 times daily, 9/26: Take 20mg (2 tablets) in the morning, take 10mg (1 tablet) at 4PM  9/27: Take 20mg (2 tablets) in the morning, take 10mg (1 tablet) at 4PM  9/28 until your Endocrine appointment (10/10): Take 10mg (1 tablet) in the morning, take 5mg (0.5 tablet) at 4PM    immune globulin, human, (Gammagard) infusion Infuse 600 mL (60 g) into a venous catheter every 14 (fourteen) days.    insulin glargine (Toujeo Max Solostar- 2 unit dial) 300 unit/mL (3 mL) injection Inject 50 units under " skin once daily    insulin lispro (HumaLOG KwikPen Insulin) 100 unit/mL injection Use as directed to give up to 100 units a day    ipratropium-albuteroL (Duo-Neb) 0.5-2.5 mg/3 mL nebulizer solution 3 mL, 4 times daily PRN    lacosamide (Vimpat) 200 mg tablet tablet 1 tablet, 2 times daily    lacosamide (Vimpat) 50 mg tablet Take 1 tablet (50 mg) by mouth every 12 hours.    levETIRAcetam (KEPPRA) 1,500 mg, 2 times daily    levothyroxine (SYNTHROID, LEVOXYL) 75 mcg, oral, Daily before breakfast    miconazole (Micotin) 2 % powder Apply to groin , under the breast and skin folds daily    miscellaneous medical supply [facility] Wipes    moisturizing mouth (Biotene Oral Dry Mouth) solution 1 spray, 4 times daily PRN    montelukast (SINGULAIR) 10 mg, oral, Nightly    Motegrity 2 mg tablet 1 tablet, Daily    nasal spray Nayzilam 5 mg/spray (0.1 mL) spray,non-aerosol Administer into affected nostril(s).    ondansetron ODT (ZOFRAN-ODT) 4 mg, oral, Every 8 hours PRN    OneTouch Delica Plus Lancet 33 gauge misc USE 1 LANCET TO CHECK GLUCOSE ONCE DAILY AS DIRECTED    OneTouch Verio test strips strip USE 1 STRIP TO CHECK GLUCOSE ONCE DAILY AS DIRECTED    pantoprazole (PROTONIX) 40 mg, 2 times daily before meals    polyethylene glycol (GLYCOLAX, MIRALAX) 17 g, Daily PRN    promethazine (Phenergan) 12.5 mg tablet Take 1 tablet (12.5 mg) by mouth if needed.    propranolol LA (INDERAL LA) 60 mg, oral, Daily, Do not crush, chew, or split.    Reyvow 100 mg, oral, As needed, Do not drive in 8 hours of taking this medicine. Maximum one dose per 24 hours.    rOPINIRole (Requip) 0.5 mg tablet Take 1 tablet by mouth (0.5mg) in the morning and 2 tablets (1mg) by mouth in the evening    senna 8.6 mg tablet 1-2 tablets, Nightly PRN    tiZANidine (ZANAFLEX) 2 mg, oral, Every 8 hours PRN    Ubrelvy 100 mg, oral, As needed, May repeat in 2 hours for max of 200mg per 24 hours.    ZINC ORAL 50 mg, Daily        Diagnostic Results:    Lab Results    Component Value Date    WBC 5.2 10/23/2024    HGB 12.7 10/23/2024    HCT 41.2 10/23/2024    MCV 91 10/23/2024     10/23/2024     Lab Results   Component Value Date    CREATININE 0.89 10/23/2024    BUN 17 10/23/2024     10/23/2024    K 4.0 10/23/2024     10/23/2024    CO2 32 10/23/2024     Lab Results   Component Value Date    INR 1.0 09/24/2024    INR 1.0 09/23/2024    INR 1.1 09/17/2024    PROTIME 10.7 09/24/2024    PROTIME 11.4 09/23/2024    PROTIME 11.9 09/17/2024       === 10/23/24 ===    CT HEAD WO IV CONTRAST    - Impression -  There is a right frontal catheter with the tip terminating near the  foramen of Monro, unchanged. The ventricles are similar in size and  configuration compared to the prior exam 09/24/2024.    No acute intracranial hemorrhage or mass effect.    MACRO:  None    Signed by: Kayla Brownlee 10/23/2024 6:38 PM  Dictation workstation:   [id number]  === 10/23/24 ===    MR ORBIT W AND WO IV CONTRAST    - Impression -  MR brain:    Limited evaluation of the right frontoparietal region due to  susceptibility artifact.  1. Interval placement of right frontal approach ventriculostomy  catheter with tip terminating within the foramen of Monro.  Unremarkable size and appearance of the ventricular system. Gliosis  within the right superior frontal lobe and anterior frontal white  matter along the course of the ventriculostomy catheter.  2. No evidence of acute infarct, intracranial mass effect, or midline  shift.      MR orbit:    Redemonstration of asymmetric atrophy of the left optic nerve. No  evidence of abnormal enhancement or intraorbital mass. The right  optic nerve is normal in size.      I personally reviewed the images/study and I agree with the findings  as stated by Dr. Brody Daley M.D. This study was interpreted at  [place], [place].    MACRO:  None    Signed by: John Mcnamara 10/23/2024 9:37 PM  Dictation workstation:    [id number]      Assessment/Plan   Assessment:  h/o IIH (dx 2/2022 w/ OP 25) s/p R frontal bolt c/b tract hemorrhage and focal epilepsy, right hemiplegic migraines, CVID on monthly IVIG infusions, Sjogren's syndrome/scleroderma, POTS, T2DM, HTN, hypothyroidism, BRENT on CPAP, anxiety/depression, left non-arteritic anterior ischemic optic neruopathy with bilateral sequential vision loss 9/17 p/w 1 mo R eye blurry vision, MRI brain RF encephalomalaxcia, MR orbit L optic nerve STIR signal, MRV negative, 9/18 s/p LP (OP 52), 9/21 s/p BAT for L hemiplegia, CTH/CTA stable RF encephalomalacia, no LVO, MRI  neg, 9/24/2024 s/p RF  shunt (certas at 5), 10/23 presenting after ophtho clinic with Dr. Mederos and found to 10 days of total right eye blindness, SS intact, 10/23 MRI brain/orbit neg    Plan:  Plan for lumbar puncture pending clearance from hematology--elevated APTT    Jesus Lambert MD

## 2024-10-25 NOTE — ED TRIAGE NOTES
Pt presents to the ED with new R eye vision loss x one week. Pt was in ED yesterday and had CT and SS performed with no concerns found. Neuro called her this AM and asked her to report back to the ED for a LP.

## 2024-10-25 NOTE — CONSULTS
Inpatient consult to Hematology  Consult performed by: Raj Sal MD  Consult ordered by: Theresa Mary MD      Reason For Consult  Prolonged PTT, concern for whether a lumbar puncture would be a safe procedure    History Of Present Illness  Jonathan Ayala is a  48-year-old female with a history of left non-arteritic anterior ischemic optic neuropathy, bilateral sequential vision loss with right eye improvement 11/13/2019, idiopathic intracranial hypertension status post ventriculoperitoneal shunt , seizures, scleroderma, Sjogren, CVID on monthly IVIG, POTS, HTN, DM2, hypothyroidism, BRENT on CPAP, migraine presents in follow up for evaluation of an interval visual disturbance. Hematology consulted for prolonged PTT and concern for whether a lumbar puncture is a safe procedure in light of this prolonged PTT.     Ms. Ayala states that she has no personal history of blood disorders and has never had a blood clot. She has had several surgeries including a  shunt placement, inguinal hernia repair, orthopedic hand procedure, cholecystectomy, appendectomy, and wisdom tooth removal x4 without post-operative bleeding complications. She does report intermittent gingival bleeding recently since initiation of levetiracetam however no other issues with bleeding reported. She has had a hysterectomy so does not have periods now, but did have heavy periods in the past prior to this procedure.     She takes some vitamins- vitamin C, zinc, and calcium-  in addition to her regular medications. No other supplements used.    Importantly, the patient has a port which was last accessed yesterday for her IVIG therapy.      Past Medical History  She has a past medical history of Abnormal findings on diagnostic imaging of other abdominal regions, including retroperitoneum (10/14/2020), Acquired deformity of nose (03/24/2022), Acute upper respiratory infection, unspecified (10/16/2019), Allergic, Allergy status to  unspecified drugs, medicaments and biological substances (05/22/2020), Allergy status to unspecified drugs, medicaments and biological substances (11/13/2020), Anemia, Anxiety (2005), Asthma, Benign intracranial hypertension (01/27/2022), Bipolar disorder, unspecified (Multi), Breast calcification, right (08/21/2018), Cellulitis of abdominal wall (09/28/2022), Cervicalgia (07/01/2020), Chronic maxillary sinusitis (01/04/2022), Chronic sialoadenitis (03/16/2020), COVID-19 (01/06/2022), Decreased white blood cell count, unspecified (11/04/2019), Disease of thyroid gland, Disturbances of salivary secretion (03/16/2020), Dry eye syndrome of bilateral lacrimal glands (10/07/2022), Encounter for preprocedural cardiovascular examination (02/01/2022), Food additives allergy status (06/11/2020), Fracture of nasal bones, initial encounter for closed fracture (03/03/2022), GERD (gastroesophageal reflux disease) (15 years old), Granuloma of right orbit (10/07/2021), History of endometrial ablation (11/09/2017), Hyperlipidemia, Hypertension, Hyperthyroidism, Hypoglycemia, Hypothyroidism, Localized swelling, mass and lump, head (03/24/2022), Major depressive disorder, recurrent, in full remission (CMS-HCC) (10/07/2021), 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), Meningitis ([facility]) (2008), Migraine, Nipple discharge (08/24/2022), Ocular pain, right eye (10/07/2022), Optic atrophy, Other abnormal and inconclusive findings on diagnostic imaging of breast (07/06/2020), Other anomalies of pupillary function (05/31/2019), Other chest pain (05/18/2020), Other conditions influencing health status (08/01/2022), Other conditions influencing health status (08/03/2021), Other specified disorders of eustachian tube, left ear (11/18/2019), Other specified disorders of nose and nasal sinuses (03/24/2022), Other specified disorders of nose and nasal sinuses (03/24/2022), Pelvic and perineal pain (07/06/2020), Personal history of other  diseases of the circulatory system (04/07/2020), Personal history of other diseases of the circulatory system (04/07/2020), Personal history of other diseases of the circulatory system, Personal history of other diseases of the digestive system, Personal history of other diseases of the digestive system (03/02/2020), Personal history of other diseases of the musculoskeletal system and connective tissue (01/19/2022), Personal history of other diseases of the musculoskeletal system and connective tissue (03/02/2021), Personal history of other diseases of the musculoskeletal system and connective tissue (06/16/2020), Personal history of other diseases of the nervous system and sense organs (11/18/2019), Personal history of other diseases of the nervous system and sense organs (09/21/2022), Personal history of other diseases of the respiratory system (04/14/2021), Personal history of other endocrine, nutritional and metabolic disease (02/17/2021), Personal history of other mental and behavioral disorders (05/27/2021), Personal history of other specified conditions (09/07/2022), Personal history of other specified conditions (10/16/2019), Personal history of other specified conditions (09/28/2022), Personal history of other specified conditions (09/16/2021), Personal history of other specified conditions (02/01/2022), Personal history of other specified conditions (03/09/2022), Personal history of other specified conditions (02/12/2014), Personal history of other specified conditions (10/27/2021), Personal history of other specified conditions (10/16/2019), Personal history of other specified conditions (02/26/2021), Personal history of other specified conditions (02/22/2021), Personal history of urinary calculi, Polycystic ovary syndrome, Postural orthostatic tachycardia syndrome (POTS), Rash and other nonspecific skin eruption (03/15/2022), Repeated falls (06/23/2021), Right lower quadrant pain (10/14/2020), Seizures  (Multi), Sjogren syndrome, unspecified (Multi), Sjogren's syndrome, Slow transit constipation (07/09/2020), Subarachnoid hemorrhage, traumatic (Multi) (04/19/2023), Thyroid nodule, Traumatic subarachnoid hemorrhage with loss of consciousness of unspecified duration, subsequent encounter (03/15/2022), Traumatic subarachnoid hemorrhage without loss of consciousness, subsequent encounter, Type 2 diabetes mellitus, Unspecified disorder of refraction (10/07/2022), Unspecified optic neuritis (11/06/2020), Unspecified optic neuritis (11/06/2020), Unspecified visual loss (09/25/2019), Varicella (As a child), Venous insufficiency (chronic) (peripheral) (10/18/2021), Viral infection, unspecified (01/11/2022), Vitamin D deficiency, and Vitamin D deficiency, unspecified (09/28/2022).    Surgical History  She has a past surgical history that includes Other surgical history (08/22/2019); Other surgical history (08/22/2019); Other surgical history (08/22/2019); Other surgical history (08/22/2019); Other surgical history (08/22/2019); Other surgical history (08/22/2019); MR angio neck wo IV contrast (02/08/2021); MR angio head wo IV contrast (02/08/2021); [place] tooth extraction (2004); Appendectomy (2017); Hysterectomy (2017); Hernia repair (Right, 03/01/2024); Cardiac catheterization; Cholecystectomy; Fracture surgery (12/2023); Endometrial ablation; and Brain surgery ([place] placement to measure pressure).     Social History  She reports that she has never smoked. She has never used smokeless tobacco. She reports that she does not currently use alcohol. She reports current drug use. Drug: Benzodiazepines.    Family History  Family History   Problem Relation Name Age of Onset    Other (Perforated bowel) Mother Radha     Hypertension Mother Radha     Macular degeneration Mother Radha     Depression Mother Radha     Hyperlipidemia Mother Radha     Mental illness Mother Radha     Hyperlipidemia Father Mac      Hypertension Father Mac     Heart attack Father Mac     Other (S/P CABG) Father Mac     Diabetes Father Mac     Prostate cancer Father Mac     Coronary artery disease Father Mac     Heart failure Father Mac     Stroke Father Mac     Cancer Father Mac     Macular degeneration Father Mac     Retinal detachment Father Mac     Asthma Father Mac     Hearing loss Father Mac     Heart disease Father Mac     Hernia Father Mac     Stroke Sister Jazmin     Breast cancer Sister Jazmin     Lupus Sister Jazmin     Ovarian cancer Sister Jazmin     Astigmatism Sister Jazmin     Arthritis Sister Jazmin     Asthma Sister Jazmin     Depression Sister Jazmin     Mental illness Sister Jazmin     Miscarriages / Stillbirths Sister Jazmin     Vision loss Sister Jazmin     Hyperlipidemia Brother Sanchez     Hypertension Brother Sanchez     Astigmatism Brother Sanchez     Arthritis Brother Sanchez     Asthma Brother Sanchez     Diabetes Father's Sister Linda     Blindness Father's Sister Linda     Vision loss Father's Sister Linda     Thyroid cancer Father's Sister Bekah     Thyroid disease Father's Sister Jessica     Colon cancer Father's Brother ?     Cancer Father's Brother Kian     Anesthesia related problems Father's Brother Kian     Depression Father's Brother Kian     Hernia Father's Brother Kian     Mental illness Father's Brother Kian     Cancer Maternal Grandmother Brenda     Ovarian cancer Maternal Grandmother Brenda     Blindness Other Multiple     Melanoma Other      Asthma Other      Other (hay fever) Other      Allergies Other      Alcohol abuse Paternal Grandfather Elkin     Arthritis Sister Isha     Asthma Sister Isha     Cancer Sister Isha     Depression Sister Isha     Mental illness Sister Isha     Miscarriages / Stillbirths Sister Isha     Ovarian cancer Sister Isha     Glaucoma Neg Hx          Allergies  Acetazolamide, Atorvastatin, Cefdinir, Ceftriaxone, Doxepin, Duloxetine, Fd and c red no.40, Levofloxacin, Levofloxacin  in d5w, Nutritional supplements, Ozempic [semaglutide], Prochlorperazine, Red dye, Rosemary, Rosemary oil, Strawberry, Sulfa (sulfonamide antibiotics), Topiramate, Tree pollen-black walnut, Tree pollen-pecan, [place], Aripiprazole, Aspartame, Aspartame (bulk), Fenofibrate, Gluten, Hydrochlorothiazide, Hydromorphone, Iron, Meclizine, Metformin, Propoxyphene, Statins-hmg-coa reductase inhibitors, Tetracyclines, Thiazides, Venlafaxine, Wheat, Adhesive tape-silicones, Barbiturates, Ciprofloxacin, Dhe, Diet foods, Farxiga [dapagliflozin], Ferrous sulfate, Nsaids (non-steroidal anti-inflammatory drug), Other, Sulfonylureas, Tobramycin, Vancomycin, Adhesive, Azithromycin, Betamethasone, House dust, Metoclopramide hcl, Prednisone, Propoxyphene-acetaminophen, Sulfacetamide sodium, [id number] antimigraine agents, and Zolpidem    Physical Exam  Constitutional: Pleasant female in no acute distress.  HEENT: Normocephalic, atraumatic. Wearing sunglasses.  Respiratory: Normal respiratory effort on RA.   Cardiovascular: Warm and well perfused.   Neuro: Alert and oriented to conversation.   Skin: Warm, dry.   Psych: Appropriate mood and affect.    Last Recorded Vitals  /63 (BP Location: Right arm)   Pulse 95   Temp 36.2 °C (97.2 °F) (Tympanic)   Resp 16   Wt 111 kg (245 lb)   SpO2 98%     Relevant Results   Latest Reference Range & Units 01/31/23 21:23 09/17/24 02:07 09/23/24 18:33 09/24/24 02:18 10/25/24 11:07 10/25/24 12:54   aPTT 27 - 38 seconds 149 (HH) 33 32 33 148 (HH) 47 (H)      Latest Reference Range & Units 10/25/24 11:07   WBC 4.4 - 11.3 x10*3/uL 5.0   nRBC 0.0 - 0.0 /100 WBCs 0.0   RBC 4.00 - 5.20 x10*6/uL 3.98 (L)   HEMOGLOBIN 12.0 - 16.0 g/dL 11.7 (L)   HEMATOCRIT 36.0 - 46.0 % 33.7 (L)   MCV 80 - 100 fL 85   MCH 26.0 - 34.0 pg 29.4   MCHC 32.0 - 36.0 g/dL 34.7   RED CELL DISTRIBUTION WIDTH 11.5 - 14.5 % 15.3 (H)   Platelets 150 - 450 x10*3/uL 266   Neutrophils % 40.0 - 80.0 % 64.3   Immature Granulocytes  %, Automated 0.0 - 0.9 % 0.2   Lymphocytes % 13.0 - 44.0 % 26.1   Monocytes % 2.0 - 10.0 % 8.0   Eosinophils % 0.0 - 6.0 % 0.8   Basophils % 0.0 - 2.0 % 0.6   Neutrophils Absolute 1.20 - 7.70 x10*3/uL 3.23   Immature Granulocytes Absolute, Automated 0.00 - 0.70 x10*3/uL 0.01   Lymphocytes Absolute 1.20 - 4.80 x10*3/uL 1.31   Monocytes Absolute 0.10 - 1.00 x10*3/uL 0.40   Eosinophils Absolute 0.00 - 0.70 x10*3/uL 0.04   Basophils Absolute 0.00 - 0.10 x10*3/uL 0.03      [facility] Reference Range & Units 04/29/20 10:35   Beta-2 Glyco 1 IgG 0.0 - 20.0 U/mL <1.4   Beta 2 Glyco 1 IgM 0.0 - 20.0 U/mL 0.3   Beta-2 Glyco 1 IgA 0.0 - 20.0 U/mL <0.6   Anticardiolipin IgG 0.0 - 20.0 GPL U/mL <1.6   Anticardiolipin IgM 0.0 - 20.0 MPL U/mL 0.3   Anticardiolipin IgA 0.0 - 20.0 APL U/mL <0.5       Assessment/Plan   Jonathan Ayala is a  48-year-old female with a history of left non-arteritic anterior ischemic optic neuropathy, bilateral sequential vision loss with right eye improvement 11/13/2019, idiopathic intracranial hypertension status post ventriculoperitoneal shunt , seizures, scleroderma, Sjogren, CVID on monthly IVIG, POTS, HTN, DM2, hypothyroidism, BRENT on CPAP, migraine presents in follow up for evaluation of an interval visual disturbance. Hematology consulted for prolonged PTT and concern for whether a lumbar puncture is a safe procedure in light of this prolonged PTT.     At this time, most likely etiology is lab error / aPTT being drawn off of recently heparinized port from IVIG yesterday. Patient does not have a strong personal history of bleeding to suggest acquired coagulopathy. Recommend rechecking aPTT, anti-Xa level from peripheral IV site to evaluate. If this is the case, it would be safe to proceed with LP tonight.     If aPTT continues to be abnormal, LP should be deferred until further workup can be performed for assessment of coagulopathy.     Recommendations:  - Please recheck aPTT, anti-Xa level from  PERIPHERAL IV site   - IF NORMAL okay to proceed with lumbar puncture tonight  - ONLY IF ABNORMAL, please order:   - Von Willebrand panel including vWB antigen, Gp1BM, von Willebrand multimers  - APS panel including B2 glycoprotein antibodies, lupus anticoagulant, and cardiolipin antibody  - Thrombin time  - Reptilase time   - Factor activity studies including Factor XII, Factor XI, Factor IX, and Factor VIII    Thank you for this interesting consult, we will continue to follow if the aPTT continues to be abnormal.   Patient seen and discussed with the attending, Dr. Mcpherson, and the fellow, Dr. Sal.    Assessment & plan not finalized until signed by attending / fellow.    Hematology Consult Pager: [phone]  Oncology Consult Pager: [phone]    AMINA Pacheco MS-4  Internal Medicine

## 2024-10-26 ENCOUNTER — APPOINTMENT (OUTPATIENT)
Dept: RADIOLOGY | Facility: HOSPITAL | Age: 49
End: 2024-10-26
Payer: MEDICAID

## 2024-10-26 VITALS
DIASTOLIC BLOOD PRESSURE: 83 MMHG | SYSTOLIC BLOOD PRESSURE: 163 MMHG | HEIGHT: 62 IN | OXYGEN SATURATION: 97 % | WEIGHT: 245 LBS | TEMPERATURE: 96.9 F | BODY MASS INDEX: 45.08 KG/M2 | RESPIRATION RATE: 16 BRPM | HEART RATE: 100 BPM

## 2024-10-26 PROBLEM — G93.2 IDIOPATHIC INTRACRANIAL HYPERTENSION: Status: ACTIVE | Noted: 2024-10-26

## 2024-10-26 PROBLEM — H54.7 VISION LOSS: Status: ACTIVE | Noted: 2024-10-26

## 2024-10-26 LAB
ABO GROUP (TYPE) IN BLOOD: NORMAL
ANTIBODY SCREEN: NORMAL
B-HCG SERPL-ACNC: <3 MIU/ML
GLUCOSE BLD MANUAL STRIP-MCNC: 94 MG/DL (ref 74–99)
RH FACTOR (ANTIGEN D): NORMAL

## 2024-10-26 PROCEDURE — 2500000001 HC RX 250 WO HCPCS SELF ADMINISTERED DRUGS (ALT 637 FOR MEDICARE OP): Mod: SE

## 2024-10-26 PROCEDURE — 70250 X-RAY EXAM OF SKULL: CPT | Performed by: STUDENT IN AN ORGANIZED HEALTH CARE EDUCATION/TRAINING PROGRAM

## 2024-10-26 PROCEDURE — 86900 BLOOD TYPING SEROLOGIC ABO: CPT

## 2024-10-26 PROCEDURE — 86901 BLOOD TYPING SEROLOGIC RH(D): CPT

## 2024-10-26 PROCEDURE — 84702 CHORIONIC GONADOTROPIN TEST: CPT

## 2024-10-26 PROCEDURE — 70250 X-RAY EXAM OF SKULL: CPT

## 2024-10-26 PROCEDURE — 82947 ASSAY GLUCOSE BLOOD QUANT: CPT

## 2024-10-26 PROCEDURE — 96365 THER/PROPH/DIAG IV INF INIT: CPT

## 2024-10-26 PROCEDURE — 74176 CT ABD & PELVIS W/O CONTRAST: CPT | Performed by: STUDENT IN AN ORGANIZED HEALTH CARE EDUCATION/TRAINING PROGRAM

## 2024-10-26 PROCEDURE — 96375 TX/PRO/DX INJ NEW DRUG ADDON: CPT

## 2024-10-26 PROCEDURE — 96366 THER/PROPH/DIAG IV INF ADDON: CPT

## 2024-10-26 PROCEDURE — 74176 CT ABD & PELVIS W/O CONTRAST: CPT

## 2024-10-26 PROCEDURE — G0378 HOSPITAL OBSERVATION PER HR: HCPCS

## 2024-10-26 PROCEDURE — 86923 COMPATIBILITY TEST ELECTRIC: CPT

## 2024-10-26 PROCEDURE — 2500000004 HC RX 250 GENERAL PHARMACY W/ HCPCS (ALT 636 FOR OP/ED): Mod: SE

## 2024-10-26 PROCEDURE — 1210000001 HC SEMI-PRIVATE ROOM DAILY

## 2024-10-26 RX ORDER — POLYETHYLENE GLYCOL 3350 17 G/17G
17 POWDER, FOR SOLUTION ORAL DAILY
Status: DISCONTINUED | OUTPATIENT
Start: 2024-10-26 | End: 2024-10-27 | Stop reason: HOSPADM

## 2024-10-26 RX ORDER — CLONAZEPAM 0.5 MG/1
0.5 TABLET ORAL 2 TIMES DAILY
Status: CANCELLED | OUTPATIENT
Start: 2024-10-26

## 2024-10-26 RX ORDER — AMITRIPTYLINE HYDROCHLORIDE 50 MG/1
100 TABLET, FILM COATED ORAL NIGHTLY
Status: CANCELLED | OUTPATIENT
Start: 2024-10-26

## 2024-10-26 RX ORDER — DIPHENHYDRAMINE HYDROCHLORIDE 50 MG/ML
25 INJECTION INTRAMUSCULAR; INTRAVENOUS ONCE
Status: COMPLETED | OUTPATIENT
Start: 2024-10-26 | End: 2024-10-26

## 2024-10-26 RX ORDER — ACETAMINOPHEN 650 MG/1
650 SUPPOSITORY RECTAL EVERY 4 HOURS PRN
Status: DISCONTINUED | OUTPATIENT
Start: 2024-10-26 | End: 2024-10-27 | Stop reason: HOSPADM

## 2024-10-26 RX ORDER — FLUTICASONE FUROATE AND VILANTEROL 200; 25 UG/1; UG/1
1 POWDER RESPIRATORY (INHALATION)
Status: CANCELLED | OUTPATIENT
Start: 2024-10-26

## 2024-10-26 RX ORDER — DIVALPROEX SODIUM 250 MG/1
500 TABLET, FILM COATED, EXTENDED RELEASE ORAL 2 TIMES DAILY
Status: CANCELLED | OUTPATIENT
Start: 2024-10-26

## 2024-10-26 RX ORDER — ACETAMINOPHEN 160 MG/5ML
650 SOLUTION ORAL EVERY 4 HOURS PRN
Status: DISCONTINUED | OUTPATIENT
Start: 2024-10-26 | End: 2024-10-27 | Stop reason: HOSPADM

## 2024-10-26 RX ORDER — METOCLOPRAMIDE HYDROCHLORIDE 5 MG/ML
10 INJECTION INTRAMUSCULAR; INTRAVENOUS ONCE
Status: COMPLETED | OUTPATIENT
Start: 2024-10-26 | End: 2024-10-26

## 2024-10-26 RX ORDER — MAGNESIUM SULFATE 1 G/100ML
1 INJECTION INTRAVENOUS ONCE
Status: COMPLETED | OUTPATIENT
Start: 2024-10-26 | End: 2024-10-26

## 2024-10-26 RX ORDER — FUROSEMIDE 40 MG/1
20 TABLET ORAL 2 TIMES DAILY
Status: CANCELLED | OUTPATIENT
Start: 2024-10-26

## 2024-10-26 RX ORDER — HEPARIN 100 UNIT/ML
SYRINGE INTRAVENOUS
Status: DISCONTINUED
Start: 2024-10-26 | End: 2024-10-27 | Stop reason: HOSPADM

## 2024-10-26 RX ORDER — EZETIMIBE 10 MG/1
10 TABLET ORAL DAILY
Status: CANCELLED | OUTPATIENT
Start: 2024-10-26

## 2024-10-26 RX ORDER — ACETAMINOPHEN 325 MG/1
650 TABLET ORAL EVERY 4 HOURS PRN
Status: DISCONTINUED | OUTPATIENT
Start: 2024-10-26 | End: 2024-10-27 | Stop reason: HOSPADM

## 2024-10-26 RX ORDER — ACETAMINOPHEN 325 MG/1
325-650 TABLET ORAL EVERY 6 HOURS PRN
Status: CANCELLED | OUTPATIENT
Start: 2024-10-26

## 2024-10-26 RX ADMIN — DEXAMETHASONE SODIUM PHOSPHATE 4 MG: 4 INJECTION, SOLUTION INTRA-ARTICULAR; INTRALESIONAL; INTRAMUSCULAR; INTRAVENOUS; SOFT TISSUE at 14:53

## 2024-10-26 RX ADMIN — MAGNESIUM SULFATE HEPTAHYDRATE 1 G: 1 INJECTION, SOLUTION INTRAVENOUS at 14:54

## 2024-10-26 RX ADMIN — ACETAMINOPHEN 650 MG: 325 TABLET ORAL at 14:39

## 2024-10-26 RX ADMIN — SODIUM CHLORIDE 1000 ML: 9 INJECTION, SOLUTION INTRAVENOUS at 14:53

## 2024-10-26 RX ADMIN — DIPHENHYDRAMINE HYDROCHLORIDE 25 MG: 50 INJECTION INTRAMUSCULAR; INTRAVENOUS at 14:53

## 2024-10-26 RX ADMIN — METOCLOPRAMIDE 10 MG: 5 INJECTION, SOLUTION INTRAMUSCULAR; INTRAVENOUS at 14:53

## 2024-10-26 ASSESSMENT — PAIN - FUNCTIONAL ASSESSMENT
PAIN_FUNCTIONAL_ASSESSMENT: 0-10
PAIN_FUNCTIONAL_ASSESSMENT: 0-10

## 2024-10-26 ASSESSMENT — PAIN SCALES - GENERAL
PAINLEVEL_OUTOF10: 3
PAINLEVEL_OUTOF10: 0 - NO PAIN
PAINLEVEL_OUTOF10: 3

## 2024-10-26 NOTE — CONSULTS
[facility]  ACUTE CARE SURGERY - CONSULT    Patient Name: Jonathan Ayala  MRN: [id number]  Admit Date: 1025  : 1975  AGE: 48 y.o.   GENDER: female  ==============================================================================  TODAY'S ASSESSMENT AND PLAN OF CARE:  49 YO F with PMH lap appendectomy, open right inguinal hernia repair with mesh, lap cholecystectomy, lap hysterectomy, and IIH s/p  shunt placement by Neurosurgery on , for which [facility] was previous consulted for intraoperative assistance, presented to ED with 10 days of right eye vision loss.      She was evaluated by neurosurgery who performed lumbar puncture on 10/25, and found elevated opening pressure.  They would like to take the patient to OR on 10/28/2024 for  shunt revision, and consulting [facility] for intraoperative assistance.    Plan:  - [facility] is available for assistance  shunt revision  - Please page team at time of surgery      Pt d/w attending surgeon, Dr. Loya,      Donnell Best MD  General Surgery, PGY-3  Please page service pager with questions  ACS [id number]  ==============================================================================  CHIEF COMPLAINT/REASON FOR CONSULT:  Intraoperative assistance with  shunt revision    PAST MEDICAL HISTORY:   PMH: idiopathic intracranial hypertension  Left eye blindness      PSH: lap appendectomy  Open right inguinal hernia repair with mesh  Lap brittany  Lap hysterectomy   shunt    FH:   Family History   Problem Relation Name Age of Onset    Other (Perforated bowel) Mother Radha     Hypertension Mother Radha     Macular degeneration Mother Radha     Depression Mother Radha     Hyperlipidemia Mother Radha     Mental illness Mother Radha     Hyperlipidemia Father Mac     Hypertension Father Mac     Heart attack Father Mac     Other (S/P CABG) Father Mac     Diabetes Father Mac     Prostate cancer Father Mac     Coronary  artery disease Father Mac     Heart failure Father Mac     Stroke Father Mac     Cancer Father Mac     Macular degeneration Father Mac     Retinal detachment Father Mac     Asthma Father Mac     Hearing loss Father Mac     Heart disease Father Mac     Hernia Father Mac     Stroke Sister Jazmin     Breast cancer Sister Jazmin     Lupus Sister Jazmin     Ovarian cancer Sister Jazmin     Astigmatism Sister Jazmin     Arthritis Sister Jazmin     Asthma Sister Jazmin     Depression Sister Jazmin     Mental illness Sister Jazmin     Miscarriages / Stillbirths Sister Jazmin     Vision loss Sister Jazmin     Hyperlipidemia Brother Sanchez     Hypertension Brother Sanchez     Astigmatism Brother Sanchez     Arthritis Brother Sanchez     Asthma Brother Sanchez     Diabetes Father's Sister Linda     Blindness Father's Sister Linda     Vision loss Father's Sister Linda     Thyroid cancer Father's Sister Bekah     Thyroid disease Father's Sister Jessica     Colon cancer Father's Brother ?     Cancer Father's Brother Kian     Anesthesia related problems Father's Brother Kian     Depression Father's Brother Kian     Hernia Father's Brother Kian     Mental illness Father's Brother Kian     Cancer Maternal Grandmother Brenda     Ovarian cancer Maternal Grandmother Brenda     Blindness Other Multiple     Melanoma Other      Asthma Other      Other (hay fever) Other      Allergies Other      Alcohol abuse Paternal Grandfather Elkin     Arthritis Sister Isha     Asthma Sister Isha     Cancer Sister Isha     Depression Sister Isha     Mental illness Sister Isha     Miscarriages / Stillbirths Sister Isha     Ovarian cancer Sister Isha     Glaucoma Neg Hx       SOCIAL HISTORY:    Smoking:    Social History     Tobacco Use   Smoking Status Never   Smokeless Tobacco Never       Alcohol:    Social History     Substance and Sexual Activity   Alcohol Use Not Currently    Comment: Socially in College       Drug use: denies    MEDICATIONS:   Prior to Admission  "medications    Medication Sig Start Date End Date Taking? Authorizing Provider   acetaminophen (Tylenol) 325 mg tablet Take 1-2 tablets (325-650 mg) by mouth every 6 hours if needed for mild pain (1 - 3). Days of infusions    Historical Provider, MD   Advair -21 mcg/actuation inhaler INHALE TWO PUFFS BY MOUTH AS INSTRUCTED TWO TIMES A DAY. 7/10/24   Historical Provider, MD   amitriptyline (Elavil) 100 mg tablet Take 1 tablet (100 mg) by mouth once daily at bedtime. 7/17/24 7/17/25  Isidoro Mensah,    amLODIPine (Norvasc) 5 mg tablet Take 1 tablet (5 mg) by mouth once daily. 10/12/23 10/11/24  Historical Provider, MD   azelastine (Astelin) 137 mcg (0.1 %) nasal spray Administer 1 spray into each nostril 2 times a day. Use in each nostril as directed    Historical Provider, MD   BD Ultra-Fine Mini Pen Needle 31 gauge x 3/16\" needle USE AS DIRECTED FIVE TIMES A DAY. 3/24/23   Historical Provider, MD   blood-glucose sensor (DEXCOM G7 SENSOR MISC) Use to check blood sugar continuously throughout the day as directed. Change sensor every 10 days.    Historical Provider, MD   calcium-vitamin D3-vitamin K (Viactiv) 650 mg-12.5 mcg-40 mcg chewable tablet Chew 2 tablets once daily. At lunch    Historical Provider, MD   carboxymethylcellulose (Refresh Celluvisc) 1 % ophthalmic solution dropperette Administer 2 drops into both eyes 3 times a day as needed for dry eyes.    Historical Provider, MD   cholecalciferol (Vitamin D-3) 5,000 Units tablet Take 1 tablet (5,000 Units) by mouth once daily. 11/2/22   Historical Provider, MD   clonazePAM (KlonoPIN) 0.5 mg tablet Take 1 tablet (0.5 mg) by mouth 2 times a day. at the same time.    Historical Provider, MD   Dexcom G7  misc Use as instructed to check blood sugars continuously throughout the day    Historical Provider, MD   diclofenac (Voltaren) 50 mg EC tablet Take 1 tablet (50 mg) by mouth 3 times a day as needed (migraine). 30 day supply 1/18/24   Evelyn MANN" MD Richardson   diphenhydrAMINE (BENADryl) 12.5 mg/5 mL liquid Take 10-20 mL (25-50 mg) by mouth once daily as needed for allergies (or migraines).    Historical Provider, MD   divalproex (Depakote ER) 500 mg 24 hr tablet Take 1 tablet (500 mg) by mouth 2 times a day. 12/24/20   Historical Provider, MD   EPINEPHrine 0.3 mg/0.3 mL injection syringe INJECT INTRAMUSCULARLY ONCE AS NEEDED FOR ANAPHYLAXIS 5/17/24   Isidoro Mensah DO   ezetimibe (Zetia) 10 mg tablet Take 1 tablet (10 mg) by mouth once daily. 4/22/24 4/22/25  Marah Felix MD   fluticasone (Flonase) 50 mcg/actuation nasal spray USE 1 SPRAY INTO EACH NOSTRIL ONCE DAILY. 6/24/24   Isidoro Mensah DO   folic acid (Folvite) 1 mg tablet Take 1 tablet (1 mg) by mouth once daily. 1/9/24   Isidoro Mensah DO   furosemide (Lasix) 20 mg tablet Take 1 tablet (20 mg) by mouth 2 times a day. 1/9/24   Isidoro Mensah DO   gloves, latex with aloe vera misc Large size gloves 10/9/24   Isidoro Mensah DO   glucagon (Baqsimi) 3 mg/actuation spray,non-aerosol USE ONE SPRAY IN THE NOSE AS NEEDED FOR LOW BLOOD SUGAR  MAY REPEAT AFTER 15 MINUTES USING A NEW DEVICE IF NO RESPONSE 4/2/24   Marah Felix MD   hydrocortisone (Cortef) 10 mg tablet Take 1 tablet (10 mg) by mouth 2 times a day. 9/26: Take 20mg (2 tablets) in the morning, take 10mg (1 tablet) at 4PM  9/27: Take 20mg (2 tablets) in the morning, take 10mg (1 tablet) at 4PM  9/28 until your Endocrine appointment (10/10): Take 10mg (1 tablet) in the morning, take 5mg (0.5 tablet) at 4PM 9/25/24   Giacomo Olguin MD   immune globulin, human, (Gammagard) infusion Infuse 600 mL (60 g) into a venous catheter every 14 (fourteen) days. 7/7/23   Historical Provider, MD   insulin glargine (Toujeo Max Solostar- 2 unit dial) 300 unit/mL (3 mL) injection Inject 50 units under skin once daily 9/12/24   Marah Felix MD   insulin lispro (HumaLOG KwikPen Insulin) 100 unit/mL injection Use as directed to  give up to 100 units a day 8/14/24   Marah Felix MD   ipratropium-albuteroL (Duo-Neb) 0.5-2.5 mg/3 mL nebulizer solution Inhale 3 mL 4 times a day as needed.    Historical Provider, MD   lacosamide (Vimpat) 200 mg tablet tablet Take 1 tablet (200 mg) by mouth 2 times a day. 3/28/22   Historical Provider, MD   lacosamide (Vimpat) 50 mg tablet Take 1 tablet (50 mg) by mouth every 12 hours. 6/23/23   Historical Provider, MD   lasmiditan 100 mg tablet Take 100 mg by mouth if needed (migraine). Do not drive in 8 hours of taking this medicine. Maximum one dose per 24 hours. 9/3/24 9/3/25  Evelyn Bai MD   levETIRAcetam (Keppra) 750 mg tablet Take 2 tablets (1,500 mg) by mouth 2 times a day.    Historical Provider, MD   levothyroxine (Synthroid, Levoxyl) 50 mcg tablet Take 1.5 tablets (75 mcg) by mouth once daily in the morning. Take before meals. 3/14/24   Marah Felix MD   miconazole (Micotin) 2 % powder Apply to groin , under the breast and skin folds daily    Historical Provider, MD   miscellaneous medical supply misc Bath Wipes 10/9/24   Isidoro Mensah,    moisturizing mouth (Biotene Oral Dry Mouth) solution Take 1 spray by mouth 4 times a day as needed.    Historical Provider, MD   montelukast (Singulair) 10 mg tablet Take 1 tablet (10 mg) by mouth once daily at bedtime. 10/18/24   Isidoro Mensah DO   Motegrity 2 mg tablet Take 1 tablet (2 mg) by mouth once daily. 5/9/22   Historical Provider, MD   nasal spray Nayzilam 5 mg/spray (0.1 mL) spray,non-aerosol Administer into affected nostril(s). 11/16/22   Historical Provider, MD   ondansetron ODT (Zofran-ODT) 4 mg disintegrating tablet Take 1 tablet (4 mg) by mouth every 8 hours if needed for nausea or vomiting. 10/18/24   Isidoro A Yanelli, DO   OneTouch Delica Plus Lancet 33 gauge misc USE 1 LANCET TO CHECK GLUCOSE ONCE DAILY AS DIRECTED 1/30/23   Historical Provider, MD   OneTouch Verio test strips strip USE 1 STRIP TO CHECK GLUCOSE ONCE  DAILY AS DIRECTED    Historical Provider, MD   pantoprazole (Protonix) 40 mg EC tablet Take 1 tablet (40 mg) by mouth 2 times a day before meals.    Historical Provider, MD   polyethylene glycol (Glycolax, Miralax) 17 gram/dose powder Mix 17 g of powder and drink once daily as needed for constipation. 8/22/24   Historical Provider, MD   promethazine (Phenergan) 12.5 mg tablet Take 1 tablet (12.5 mg) by mouth if needed. 8/18/23   Historical Provider, MD   propranolol LA (Inderal LA) 60 mg 24 hr capsule Take 1 capsule (60 mg) by mouth once daily. Do not crush, chew, or split. 6/18/24 6/18/25  Isidoro Mensah DO   rOPINIRole (Requip) 0.5 mg tablet Take 1 tablet by mouth (0.5mg) in the morning and 2 tablets (1mg) by mouth in the evening    Historical Provider, MD   senna 8.6 mg tablet Take 1-2 tablets (8.6-17.2 mg) by mouth as needed at bedtime for constipation. 7/31/23   Historical Provider, MD   tiZANidine (Zanaflex) 2 mg tablet Take 1 tablet (2 mg) by mouth every 8 hours if needed for muscle spasms. 2/16/24 10/2/24  Isidoro Mensah DO   ubrogepant (Ubrelvy) 100 mg tablet tablet Take 1 tablet (100 mg) by mouth if needed (migraine). May repeat in 2 hours for max of 200mg per 24 hours. 9/11/24 9/11/25  Evelyn Bai MD   ZINC ORAL Take 50 mg by mouth once daily.    Historical Provider, MD     ALLERGIES:   Allergies   Allergen Reactions    Acetazolamide Hives, Rash, Shortness of breath and Swelling     oral hives, redness, ABD pain    Atorvastatin Unknown     Causes muscle pain    Cefdinir Itching, Rash, Shortness of breath and Anaphylaxis     Itchy throat    Throat closing / difficulty breathing.  Took Benadryl at home.    Itchy throat      Throat closing / difficulty breathing.  Took Benadryl at home.    Ceftriaxone Anaphylaxis, Rash, Shortness of breath and Swelling     SOB    SOB hives turned red during gallbladder    Doxepin Anaphylaxis, Hives, Swelling, Angioedema and Rash     Itchy sore throat problems  "with swallowing    facial swelling    Itchy sore throat problems with swallowing      facial swelling    Duloxetine Anaphylaxis, Hallucinations and Rash     suicidal ideation    suicidal ideation     Other reaction(s): suicidal ideation    suicidal    Suicidal ideation    Fd And C Red No.40 Anaphylaxis    Levofloxacin Angioedema, Swelling and Rash     eyelid swelling    eyelid swelling. Patient tolerates Avelox    Levofloxacin In D5w Anaphylaxis     Moon face lips swelling just Levaquin tolerated D5W)    Nutritional Supplements Anaphylaxis     Grapes ( red dye    Ozempic [Semaglutide] Nausea/vomiting     Severe gastroparesis worsening     Prochlorperazine Anaphylaxis     HIGH Compazine involuntary muscle spasms low doses tolerated)    Red Dye Anaphylaxis    Becky Anaphylaxis and Swelling     Becky    SOB facial swelling    Rosemary Oil Anaphylaxis, Itching and Swelling     Becky  SOB facial swelling      SOB facial swelling    Strawberry Shortness of breath     White blisters in mouth      Other reaction(s): white blisters in mouth, shortness of breath    Sulfa (Sulfonamide Antibiotics) Anaphylaxis, Rash, Shortness of breath and Swelling     SOB itchy hives    Other Reaction(s): rash, SOB      SOB itchy hives    Topiramate Anaphylaxis, Swelling and Angioedema     eyelid swelling    Throat swelling    eyelid swelling  Throat swelling      Throat swelling      eyelid swelling    Tree Pollen-Black [place] Shortness of breath     Other Reaction(s): Other: See Comments      White blisters in mouth      blisters in mouth-carries an EPIPEN-\"I have gotten short of breath\"    Tree Pollen-Pecan Shortness of breath     pecans    [place] Shortness of breath    Aripiprazole Unknown, Fever and Other     fever and tremors    Fevers and Tremors    Tremor    Aspartame Diarrhea     Blood sugar [place], Diarrhea    Aspartame (Bulk) Diarrhea, Nausea Only and Nausea/vomiting     Other Reaction(s): nausea, diarrhea, abdominal pain   "    Blood sugar [place], Diarrhea    Fenofibrate Other     Double vision    Gluten Itching, Other and Rash     Rashes constipation gastric discomfort    Hydrochlorothiazide Hives, Itching and Rash     hctz removed as free-text allergy and entered as [facility] allergy,1455 10/6/2007JO      itchy hives    Hydromorphone Unknown, GI intolerance and Nausea Only     Intolerance nausea instant    Iron Hives and Rash     ichy hive ( can tolerate ferrous gluconate)    Meclizine Angioedema, Other and Swelling     eyelid swelling    Swelling of the eyelids    Eyelids and face    Metformin Other     Other reaction(s): Dyspepsia, Dyspepsia    Propoxyphene Unknown and Hives     Darvocet itchy hives    Statins-Hmg-Coa Reductase Inhibitors Unknown     Double vision    Tetracyclines Hives, Itching and Rash     sulfa    Rash itching    Itchy  rash    Thiazides Hives, Itching and Rash     itchy hives    Venlafaxine Unknown and Fever     Fevers chills    Wheat Unknown     oral blisters    Adhesive Tape-Silicones Itching and Other    Barbiturates Unknown    Ciprofloxacin Hives    Dhe Unknown     Raynaud's disease    Diet Foods Diarrhea     Aspartame allergy only. Removes to many foods to interface.    Farxiga [Dapagliflozin] Other     Frequent yeast infections and UTI    Ferrous Sulfate Hives    Nsaids (Non-Steroidal Anti-Inflammatory Drug) Unknown and Nausea/vomiting    Other Unknown    Sulfonylureas Unknown    Tobramycin Unknown    Vancomycin Unknown and Hives     Niyah syndrome    Other reaction(s): Other    Red man syndrome if given fast, benadryl with.     Niyah syndrome  Other reaction(s): Other      Other reaction(s): Other      Red man syndrome if given fast, benadryl with.    Adhesive Rash    Azithromycin Hives, Itching and Rash    Betamethasone Nausea And Vomiting, Other, GI intolerance and Diarrhea     N/V/D    House Dust Rash    Metoclopramide Hcl Other    Prednisone Rash    Propoxyphene-Acetaminophen Hives and Rash     Sulfacetamide Sodium Rash and Swelling    Qabnmxse-5-Ta6 Antimigraine Agents Nausea Only         None due to Raynaud's  ( Triptans cause a stroke)    Zolpidem Other and Hallucinations     Sleep walk    Other Reaction(s): insomnia and agitation      Sleep walk       REVIEW OF SYSTEMS:  Review of Systems  All systems are reviewed and negative otherwise stated above  PHYSICAL EXAM:  Physical Exam  Constitutional: no acute distress  Neuro: A/O x4, no gross deficits   Psych: normal affect  Cardiac: RR  Pulmonary: unlabored respirations   Abdomen: soft, non distended, non tender, with two small laparoscopic incisions well approximated, no drainage, healing well  Skin: warm and dry overall    Extremities: no swelling noted  MSK: moving all four    IMAGING SUMMARY:  (summary of findings, not a copy of dictation)  \    LABS:  Results from last 7 days   Lab Units 10/25/24  1107 10/23/24  1726   WBC AUTO x10*3/uL 5.0 5.2   HEMOGLOBIN g/dL 11.7* 12.7   HEMATOCRIT % 33.7* 41.2   PLATELETS AUTO x10*3/uL 266 295   NEUTROS PCT AUTO % 64.3 60.0   LYMPHS PCT AUTO % 26.1 31.5   MONOS PCT AUTO % 8.0 6.7   EOS PCT AUTO % 0.8 0.8     Results from last 7 days   Lab Units 10/25/24  1944 10/25/24  1254 10/25/24  1107   APTT seconds 27 47* 148*   INR  1.0 1.0 0.9     Results from last 7 days   Lab Units 10/25/24  1107 10/23/24  1726   SODIUM mmol/L 137 141   POTASSIUM mmol/L 3.9 4.0   CHLORIDE mmol/L 102 100   CO2 mmol/L 23 32   BUN mg/dL 20 17   CREATININE mg/dL 0.79 0.89   CALCIUM mg/dL 8.8 9.7   PROTEIN TOTAL g/dL 7.5 7.5   BILIRUBIN TOTAL mg/dL 0.2 0.3   ALK PHOS U/L 79 87   ALT U/L 28 21   AST U/L 21 21   GLUCOSE mg/dL 195* 144*     Results from last 7 days   Lab Units 10/25/24  1107 10/23/24  1726   BILIRUBIN TOTAL mg/dL 0.2 0.3             I have reviewed all laboratory and imaging results ordered/pertinent for this encounter.

## 2024-10-26 NOTE — PROGRESS NOTES
Emergency Department Transition of Care Note       Signout   I received Jonathan Ayala in signout from Dr. Alvarez.  Please see the ED Provider Note for all HPI, PE and MDM up to the time of signout at 2300.  This is in addition to the primary record.    In brief Jonathan Ayala is an 48 y.o. female presenting for migraines with history of prior  shunt, presenting from neurosurgery office for lumbar puncture.     At the time of signout we were awaiting:  Lumbar puncture    ED Course & Medical Decision Making   Medical Decision Making:  Under my care, patient made hemodynamically stable, pending lumbar puncture by neurosurgery. NSGY team was able to perform at bedside - removed 30ml, unclear what opening pressures were but no CSF was sent. Further discussed with neurosurgery who requested observation. Recommended admission to their service as patient has no other acute issues aside from migraine with elevated intracranial pressure. Agreed to admission to NSGY team for further work up.     ED Course:  ED Course as of 10/26/24 0747   Fri Oct 25, 2024   1246 Attending summary:  49 y/o F with extensive PMH most relevant for ** presenting for right vision loss. Seen in ED on 10/23 with negative MRI and ophtho exams. Neurosurg saw and cleared for dc. She was called in today by neurosurg bc they told her she needs an LP. R vision loss has been about 10 days now, cannot see out of that eye. No pain. Vitals show mild tachycardia, otherwise unremarkable. Normal external eye exam. She had shunt evaluated 10/23 without concern for malfunction- I imagine this is why they wanted the LP. She has no indication for emergent LP without signs or symptoms of meningitis. Will consult neurosurgery, dispo pending their recs.  [SS]   8632 Patient signed out to me at 1500, I touched base with neurosurgery team who states they have discussed with hematology team and awaiting clearance, hematology is aware of patient.  Patient's disposition  pending hematology clearance and lumbar puncture by neurosurgery team [SA]   1751 After discussion with hematology and NSGY, plan to repeat anti-Xa and PTT and based on this perform LP today or admit for workup and LP clearance [SA]   2045 Repeat labs normalized, NSGY paged for LP [SA]      ED Course User Index  [SA] Tiffany Alvarez DO  [SS] Theresa Mary MD         Diagnoses as of 10/26/24 0747   Vision loss       Disposition   As a result of their workup, the patient will require admission to the hospital.  The patient was informed of her diagnosis.  The patient was given the opportunity to ask questions and I answered them. The patient agreed to be admitted to the hospital.    Procedures   Procedures    Patient seen and discussed with ED attending physician.    Bibi Alves DO  Emergency Medicine

## 2024-10-26 NOTE — CONSULTS
Reason For Consult  Eye exam after lumbar puncture     History Of Present Illness  Jonathan Ayala is a 48 y.o. female with PMH of left non-arteritic anterior ischemic optic neuropathy, bilateral sequential vision loss with right eye improvement 11/13/2019, idiopathic intracranial hypertension status post ventriculoperitoneal shunt , seizures, scleroderma, Sjogren, CVID on monthly IVIG, POTS, HTN, DM2, hypothyroidism, BRENT on CPAP, migraine  with ophthalmology consulted for repeat eye exam after lumbar puncture.     She was recently admitted 9/16/24 due to new grade 4 right optic disc edema. LP was obtained 9/18 with elevated opening pressure to 52mmHg and  shunt was placed on 9/24 for IIH. At this time, her vision was OD 20/25 pinhole (ph) 20/20 and OS 20/200 pinhole (ph) 20/70. She was noted to have improving optic disc edema on 10/3/24 in Dr. Mederos's clinic, and then was seen on 10/23/24 and found to have significantly reduced vision in the right eye to light perception (LP). The concern was for NAION with spontaneous worsening or uncontrolled IIH or optic neuritis or stroke.     She is now s/p LP with opening pressure 28, and she reports mild clearing in vision in superotemporal region of the right eye. She continues to endorse headaches. She denies TVOs, positional component to headaches, or flashes and floaters.      Past Medical History  She has a past medical history of Abnormal findings on diagnostic imaging of other abdominal regions, including retroperitoneum (10/14/2020), Acquired deformity of nose (03/24/2022), Acute upper respiratory infection, unspecified (10/16/2019), Allergic, Allergy status to unspecified drugs, medicaments and biological substances (05/22/2020), Allergy status to unspecified drugs, medicaments and biological substances (11/13/2020), Anemia, Anxiety (2005), Asthma, Benign intracranial hypertension (01/27/2022), Bipolar disorder, unspecified (Multi), Breast calcification, right  (08/21/2018), Cellulitis of abdominal wall (09/28/2022), Cervicalgia (07/01/2020), Chronic maxillary sinusitis (01/04/2022), Chronic sialoadenitis (03/16/2020), COVID-19 (01/06/2022), Decreased white blood cell count, unspecified (11/04/2019), Disease of thyroid gland, Disturbances of salivary secretion (03/16/2020), Dry eye syndrome of bilateral lacrimal glands (10/07/2022), Encounter for preprocedural cardiovascular examination (02/01/2022), Food additives allergy status (06/11/2020), Fracture of nasal bones, initial encounter for closed fracture (03/03/2022), GERD (gastroesophageal reflux disease) (15 years old), Granuloma of right orbit (10/07/2021), History of endometrial ablation (11/09/2017), Hyperlipidemia, Hypertension, Hyperthyroidism, Hypoglycemia, Hypothyroidism, Localized swelling, mass and lump, head (03/24/2022), Major depressive disorder, recurrent, in full remission (CMS-HCC) (10/07/2021), 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), Meningitis ([facility]) (2008), Migraine, Nipple discharge (08/24/2022), Ocular pain, right eye (10/07/2022), Optic atrophy, Other abnormal and inconclusive findings on diagnostic imaging of breast (07/06/2020), Other anomalies of pupillary function (05/31/2019), Other chest pain (05/18/2020), Other conditions influencing health status (08/01/2022), Other conditions influencing health status (08/03/2021), Other specified disorders of eustachian tube, left ear (11/18/2019), Other specified disorders of nose and nasal sinuses (03/24/2022), Other specified disorders of nose and nasal sinuses (03/24/2022), Pelvic and perineal pain (07/06/2020), Personal history of other diseases of the circulatory system (04/07/2020), Personal history of other diseases of the circulatory system (04/07/2020), Personal history of other diseases of the circulatory system, Personal history of other diseases of the digestive system, Personal history of other diseases of the digestive system  (03/02/2020), Personal history of other diseases of the musculoskeletal system and connective tissue (01/19/2022), Personal history of other diseases of the musculoskeletal system and connective tissue (03/02/2021), Personal history of other diseases of the musculoskeletal system and connective tissue (06/16/2020), Personal history of other diseases of the nervous system and sense organs (11/18/2019), Personal history of other diseases of the nervous system and sense organs (09/21/2022), Personal history of other diseases of the respiratory system (04/14/2021), Personal history of other endocrine, nutritional and metabolic disease (02/17/2021), Personal history of other mental and behavioral disorders (05/27/2021), Personal history of other specified conditions (09/07/2022), Personal history of other specified conditions (10/16/2019), Personal history of other specified conditions (09/28/2022), Personal history of other specified conditions (09/16/2021), Personal history of other specified conditions (02/01/2022), Personal history of other specified conditions (03/09/2022), Personal history of other specified conditions (02/12/2014), Personal history of other specified conditions (10/27/2021), Personal history of other specified conditions (10/16/2019), Personal history of other specified conditions (02/26/2021), Personal history of other specified conditions (02/22/2021), Personal history of urinary calculi, Polycystic ovary syndrome, Postural orthostatic tachycardia syndrome (POTS), Rash and other nonspecific skin eruption (03/15/2022), Repeated falls (06/23/2021), Right lower quadrant pain (10/14/2020), Seizures (Multi), Sjogren syndrome, unspecified (Multi), Sjogren's syndrome, Slow transit constipation (07/09/2020), Subarachnoid hemorrhage, traumatic (Multi) (04/19/2023), Thyroid nodule, Traumatic subarachnoid hemorrhage with loss of consciousness of unspecified duration, subsequent encounter (03/15/2022),  Traumatic subarachnoid hemorrhage without loss of consciousness, subsequent encounter, Type 2 diabetes mellitus, Unspecified disorder of refraction (10/07/2022), Unspecified optic neuritis (11/06/2020), Unspecified optic neuritis (11/06/2020), Unspecified visual loss (09/25/2019), Varicella (As a child), Venous insufficiency (chronic) (peripheral) (10/18/2021), Viral infection, unspecified (01/11/2022), Vitamin D deficiency, and Vitamin D deficiency, unspecified (09/28/2022).    Surgical History  She has a past surgical history that includes Other surgical history (08/22/2019); Other surgical history (08/22/2019); Other surgical history (08/22/2019); Other surgical history (08/22/2019); Other surgical history (08/22/2019); Other surgical history (08/22/2019); MR angio neck wo IV contrast (02/08/2021); MR angio head wo IV contrast (02/08/2021); [place] tooth extraction (2004); Appendectomy (2017); Hysterectomy (2017); Hernia repair (Right, 03/01/2024); Cardiac catheterization; Cholecystectomy; Fracture surgery (12/2023); Endometrial ablation; and Brain surgery (Lenore placement to measure pressure).    Social History  She reports that she has never smoked. She has never used smokeless tobacco. She reports that she does not currently use alcohol. She reports current drug use. Drug: Benzodiazepines.    Family History  Family History   Problem Relation Name Age of Onset    Other (Perforated bowel) Mother Radha     Hypertension Mother Radha     Macular degeneration Mother Radha     Depression Mother Radha     Hyperlipidemia Mother Radha     Mental illness Mother Radha     Hyperlipidemia Father Mac     Hypertension Father Mac     Heart attack Father Mac     Other (S/P CABG) Father Mac     Diabetes Father Mac     Prostate cancer Father Mac     Coronary artery disease Father Mac     Heart failure Father Mac     Stroke Father Mac     Cancer Father Mac     Macular degeneration Father Mac      Retinal detachment Father Mac     Asthma Father Mac     Hearing loss Father Mac     Heart disease Father Mac     Hernia Father Mac     Stroke Sister Jazmin     Breast cancer Sister Jazmin     Lupus Sister Jazmin     Ovarian cancer Sister Jazmin     Astigmatism Sister Jazmin     Arthritis Sister Jazmin     Asthma Sister Jazmin     Depression Sister Jazmin     Mental illness Sister Jazmin     Miscarriages / Stillbirths Sister Jazmin     Vision loss Sister Jazmin     Hyperlipidemia Brother Sanchez     Hypertension Brother Sanchez     Astigmatism Brother Sanchez     Arthritis Brother Sanchez     Asthma Brother Sanchez     Diabetes Father's Sister Linda     Blindness Father's Sister Linda     Vision loss Father's Sister Linda     Thyroid cancer Father's Sister Bekah     Thyroid disease Father's Sister Jessica     Colon cancer Father's Brother ?     Cancer Father's Brother Kian     Anesthesia related problems Father's Brother Kian     Depression Father's Brother Kian     Hernia Father's Brother Kian     Mental illness Father's Brother Kian     Cancer Maternal Grandmother Brenda     Ovarian cancer Maternal Grandmother Brenda     Blindness Other Multiple     Melanoma Other      Asthma Other      Other (hay fever) Other      Allergies Other      Alcohol abuse Paternal Grandfather Elkin     Arthritis Sister Isha     Asthma Sister Isha     Cancer Sister Isha     Depression Sister Isha     Mental illness Sister Isha     Miscarriages / Stillbirths Sister Isha     Ovarian cancer Sister Isha     Glaucoma Neg Hx          Allergies  Acetazolamide, Atorvastatin, Cefdinir, Ceftriaxone, Doxepin, Duloxetine, Fd and c red no.40, Levofloxacin, Levofloxacin in d5w, Nutritional supplements, Ozempic [semaglutide], Prochlorperazine, Red dye, Rosemary, Rosemary oil, Strawberry, Sulfa (sulfonamide antibiotics), Topiramate, Tree pollen-black walnut, Tree pollen-pecan, Kula, Aripiprazole, Aspartame, Aspartame (bulk), Fenofibrate, Gluten, Hydrochlorothiazide,  "Hydromorphone, Iron, Meclizine, Metformin, Propoxyphene, Statins-hmg-coa reductase inhibitors, Tetracyclines, Thiazides, Venlafaxine, Wheat, Adhesive tape-silicones, Barbiturates, Ciprofloxacin, Dhe, Diet foods, Farxiga [dapagliflozin], Ferrous sulfate, Nsaids (non-steroidal anti-inflammatory drug), Other, Sulfonylureas, Tobramycin, Vancomycin, Adhesive, Azithromycin, Betamethasone, House dust, Metoclopramide hcl, Prednisone, Propoxyphene-acetaminophen, Sulfacetamide sodium, Nbjhecol-5-nd8 antimigraine agents, and Zolpidem    Review of Systems  As above     Physical Exam  Base Eye Exam       Visual Acuity (Snellen - Linear)         Right Left    Near sc HM superotemporally HM centrally              Tonometry (Tonopen, 1:35 PM)         Right Left    Pressure 13 15              Pupils         Dark Light Shape React APD    Right 6 5 Round Sluggish None    Left 6 5 Round Sluggish None              Visual Fields         Left Right                                Extraocular Movement         Right Left     Full Full              Neuro/Psych       Oriented x3: Yes    Mood/Affect: Normal                  Slit Lamp and Fundus Exam       External Exam         Right Left    External Normal Normal              Slit Lamp Exam         Right Left    Lids/Lashes Normal Normal    Conjunctiva/Sclera White and quiet White and quiet    Cornea Clear Clear    Anterior Chamber Deep and quiet Deep and quiet    Iris Round and reactive Round and reactive    Lens Clear Clear    Anterior Vitreous Normal Normal              Fundus Exam         Right Left    Disc Resolved edema, new splinter hemorrhage inferotemporally Pallor    C/D Ratio 0.15 0.15    Macula Normal Normal    Vessels Normal Normal                     Last Recorded Vitals  Blood pressure 113/74, pulse 86, temperature 36.1 °C (96.9 °F), temperature source Temporal, resp. rate 16, height 1.575 m (5' 2\"), weight 111 kg (245 lb), SpO2 96%.    Relevant Results         Assessment/Plan "     #Vision loss in R eye   - Jonathan Ayala is a 49yo female with PMH of left non-arteritic anterior ischemic optic neuropathy, bilateral sequential vision loss with right eye improvement 11/13/2019, idiopathic intracranial hypertension status post ventriculoperitoneal shunt , seizures, scleroderma, Sjogren, CVID on monthly IVIG, POTS, HTN, DM2, hypothyroidism, BRENT on CPAP, migraine.   - She is currently admitted with significantly reduced vision in the right eye, thought to be 2/2 shunt failure after LP was obtained with opening pressure of 28 vs NAION with spontaneous worsening. MRI brain and orbits were obtained without evidence of optic nerve enhancement. Ophthalmology re-engaged after LP to assess for visual improvement and consideration of shunt revision.     - Exam today with improved VA from LP to HM in superotemporal corner of right eye. Pupillary exam without clear APD however this is confounded by history of NAION.   - Optic nerve OD with resolved edema but new splinter hemorrhage inferotemporally OD. OS with sharp margins.     Recommendations:   - Though visual prognosis and recovery is guarded, would recommend shunt revision   - Recommendations discussed with neurosurgery and patient, and at this time, she elects to proceed with shunt revision, scheduled for 10/28/24   - Ophthalmology will continue to follow     Patient discussed with attending physician, Dr. Mederos.     Herminia Marie MD  Ophthalmology, PGY3    Ophthalmology Adult Pager - [phone]  Ophthalmology Pediatrics Pager (M-F 8:00am-5:00pm) - [phone]    For adult follow-up appointments, call: [phone]  For pediatric follow-up appointments, call: [phone]

## 2024-10-26 NOTE — PROCEDURES
Lumbar Puncture    Date/Time: 10/25/2024 10:00 PM    Performed by: Rob Durham MD  Authorized by: Fabio Mcdonald MD    Consent:     Consent obtained:  Verbal    Consent given by:  Patient    Risks, benefits, and alternatives were discussed: yes      Risks discussed:  Bleeding, infection, pain and nerve damage  Anesthesia:     Anesthesia method:  Local infiltration    Local anesthetic:  Lidocaine 1% w/o epi  Procedure details:     Lumbar space:  L4-L5 interspace    Patient position:  L lateral decubitus    Ultrasound guidance: no      Opening pressure (cm H2O):  28    Closing pressure (cm H2O):  8    Fluid appearance:  Blood-tinged and blood-tinged then clearing    Tubes of fluid:  3    Total volume (ml):  30  Post-procedure details:     Procedure completion:  Tolerated

## 2024-10-27 NOTE — SIGNIFICANT EVENT
Called to bedside by RN regarding patient wishing to leave the hospital.    Patient reported that she thought long and hard about the surgery and finally decided not to have it. She said she is very [place] and feels like she has a gut feeling that she should not go through with it. On top of that, she recently got a phone call about a family member and she is worried something is wrong, therefore she feels it is most important for her to leave the hospital to go be with them. Patient understood that if she leaves the chances of her regaining her vision is severely decreased and can be permanently blind. but wishes to leave regardless. All questions were answered.    Jesus Lambert MD  PGY-2 Neurosurgery  10:47 PM

## 2024-10-27 NOTE — DISCHARGE SUMMARY
Discharge Diagnosis  Idiopathic intracranial hypertension    Issues Requiring Follow-Up  shunt    Test Results Pending At Discharge  Pending Labs       No current pending labs.            Hospital Course   48yF h/o IIH (dx 2/2022 w/ OP 25) s/p R frontal bolt c/b tract hemorrhage and focal epilepsy, right hemiplegic migraines, CVID on monthly IVIG infusions, Sjogren's syndrome/scleroderma, POTS, T2DM, HTN, hypothyroidism, BRENT on CPAP, anxiety/depression, left non-arteritic anterior ischemic optic neruopathy with bilateral sequential vision loss 9/17 p/w 1 mo R eye blurry vision, MRI brain RF encephalomalaxcia, MR orbit L optic nerve STIR signal, MRV negative, 9/18 s/p LP (OP 52), 9/21 s/p BAT for L hemiplegia, CTH/CTA stable RF encephalomalacia, no LVO, MRI neg, 9/24/2024 s/p RF  shunt (certas at 5), 10/23 presenting after ophtho clinic with Dr. Mederos and found to 10 days of total right eye blindness, SS intact, 10/23 MRI brain/orbit neg     S/p LP (OP 28), with improvement in ophtho exam. Shunt dialed to Certas 3.    Patient opted for no surgical intervention and was concerned about a possible family emergency so therefore decided to leave.    Pertinent Physical Exam At Time of Discharge  Physical Exam  A&Ox3  Blind bilaterally  Face symmetric  RUE 5/5  LUE 5/5  RLE 5/5  LLE 5/5  Sensation intact to light touch throughout all extremities      Home Medications     Medication List      CONTINUE taking these medications     acetaminophen 325 mg tablet; Commonly known as: Tylenol   Advair -21 mcg/actuation inhaler; Generic drug: fluticasone   propion-salmeteroL   amitriptyline 100 mg tablet; Commonly known as: Elavil; Take 1 tablet   (100 mg) by mouth once daily at bedtime.   azelastine 137 mcg (0.1 %) nasal spray; Commonly known as: Astelin   Baqsimi 3 mg/actuation spray,non-aerosol; Generic drug: glucagon; USE   ONE SPRAY IN THE NOSE AS NEEDED FOR LOW BLOOD SUGAR  MAY REPEAT AFTER 15   MINUTES USING A NEW  "DEVICE IF NO RESPONSE   BD Ultra-Fine Mini Pen Needle 31 gauge x 3/16\" needle; Generic drug: pen   needle, diabetic   carboxymethylcellulose 1 % ophthalmic solution dropperette; Commonly   known as: Refresh Celluvisc   cholecalciferol 5,000 Units tablet; Commonly known as: Vitamin D-3   Dexcom G7  misc; Generic drug: blood-glucose meter,continuous   DEXCOM G7 SENSOR MISC   diclofenac 50 mg EC tablet; Commonly known as: Voltaren; Take 1 tablet   (50 mg) by mouth 3 times a day as needed (migraine). 30 day supply   diphenhydrAMINE 12.5 mg/5 mL liquid; Commonly known as: BENADryl   divalproex 500 mg 24 hr tablet; Commonly known as: Depakote ER   EPINEPHrine 0.3 mg/0.3 mL injection syringe; Commonly known as: Epipen;   INJECT INTRAMUSCULARLY ONCE AS NEEDED FOR ANAPHYLAXIS   ezetimibe 10 mg tablet; Commonly known as: Zetia; Take 1 tablet (10 mg)   by mouth once daily.   fluticasone 50 mcg/actuation nasal spray; Commonly known as: Flonase;   USE 1 SPRAY INTO EACH NOSTRIL ONCE DAILY.   folic acid 1 mg tablet; Commonly known as: Folvite; Take 1 tablet (1 mg)   by mouth once daily.   furosemide 20 mg tablet; Commonly known as: Lasix; Take 1 tablet (20 mg)   by mouth 2 times a day.   gloves, latex with aloe vera misc; Large size gloves   hydrocortisone 10 mg tablet; Commonly known as: Cortef; Take 1 tablet   (10 mg) by mouth 2 times a day. 9/26: Take 20mg (2 tablets) in the   morning, take 10mg (1 tablet) at 4PM 9/27: Take 20mg (2 tablets) in the   morning, take 10mg (1 tablet) at 4PM 9/28 until your Endocrine appointment   (10/10): Take 10mg (1 tablet) in the morning, take 5mg (0.5 tablet) at 4PM   immune globulin (human) infusion; Commonly known as: Gammagard   insulin lispro 100 unit/mL injection; Commonly known as: HumaLOG KwikPen   Insulin; Use as directed to give up to 100 units a day   ipratropium-albuteroL 0.5-2.5 mg/3 mL nebulizer solution; Commonly known   as: Duo-Neb   KlonoPIN 0.5 mg tablet; Generic drug: " clonazePAM   * lacosamide 200 mg tablet tablet; Commonly known as: Vimpat   * lacosamide 50 mg tablet; Commonly known as: Vimpat   levETIRAcetam 750 mg tablet; Commonly known as: Keppra   levothyroxine 50 mcg tablet; Commonly known as: Synthroid, Levoxyl; Take   1.5 tablets (75 mcg) by mouth once daily in the morning. Take before   meals.   miconazole 2 % powder; Commonly known as: Micotin   miscellaneous medical supply misc; Bath Wipes   moisturizing mouth solution; Commonly known as: Biotene Oral Dry Mouth   montelukast 10 mg tablet; Commonly known as: Singulair; Take 1 tablet   (10 mg) by mouth once daily at bedtime.   Motegrity 2 mg tablet; Generic drug: prucalopride   nasal spray Nayzilam 5 mg/spray (0.1 mL) spray,non-aerosol; Generic   drug: midazolam   ondansetron ODT 4 mg disintegrating tablet; Commonly known as:   Zofran-ODT; Take 1 tablet (4 mg) by mouth every 8 hours if needed for   nausea or vomiting.   OneTouch Delica Plus Lancet 33 gauge misc; Generic drug: lancets   OneTouch Verio test strips strip; Generic drug: blood sugar diagnostic   polyethylene glycol 17 gram/dose powder; Commonly known as: Glycolax,   Miralax   promethazine 12.5 mg tablet; Commonly known as: Phenergan   propranolol LA 60 mg 24 hr capsule; Commonly known as: Inderal LA; Take   1 capsule (60 mg) by mouth once daily. Do not crush, chew, or split.   Protonix 40 mg EC tablet; Generic drug: pantoprazole   Reyvow 100 mg tablet; Generic drug: lasmiditan; Take 100 mg by mouth if   needed (migraine). Do not drive in 8 hours of taking this medicine.   Maximum one dose per 24 hours.   rOPINIRole 0.5 mg tablet; Commonly known as: Requip   senna 8.6 mg tablet; Generic drug: sennosides   Toujeo Max U-300 SoloStar 300 unit/mL (3 mL) injection; Generic drug:   insulin glargine; Inject 50 units under skin once daily   Ubrelvy 100 mg tablet tablet; Generic drug: ubrogepant; Take 1 tablet   (100 mg) by mouth if needed (migraine). May repeat in 2  hours for max of   200mg per 24 hours.   Viactiv 650 mg-12.5 mcg-40 mcg chewable tablet; Generic drug:   calcium-vitamin D3-vitamin K   ZINC ORAL  * This list has 2 medication(s) that are the same as other medications   prescribed for you. Read the directions carefully, and ask your doctor or   other care provider to review them with you.     STOP taking these medications     amLODIPine 5 mg tablet; Commonly known as: Norvasc   tiZANidine 2 mg tablet; Commonly known as: Zanaflex       Outpatient Follow-Up  Future Appointments   Date Time Provider Department Center   10/28/2024  2:45 PM Avelino Tafoya MD [id number] [facility]   10/29/2024 11:30 AM Maria Del Rosario Alatorre, APRN-CNP [id number] [facility]   12/20/2024 11:30 AM Joanna Evans PharmD [id number] [facility]   1/7/2025  4:00 PM Isidoro Mensah DO [id number] [facility]   4/3/2025 12:00 PM Marah Felix MD [id number] [facility]       Jesus Lambert MD

## 2024-10-28 ENCOUNTER — APPOINTMENT (OUTPATIENT)
Dept: NEUROSURGERY | Facility: CLINIC | Age: 49
End: 2024-10-28
Payer: MEDICAID

## 2024-10-28 VITALS
BODY MASS INDEX: 45.08 KG/M2 | WEIGHT: 245 LBS | SYSTOLIC BLOOD PRESSURE: 170 MMHG | RESPIRATION RATE: 22 BRPM | HEART RATE: 78 BPM | HEIGHT: 62 IN | DIASTOLIC BLOOD PRESSURE: 90 MMHG

## 2024-10-28 DIAGNOSIS — G93.2 IDIOPATHIC INTRACRANIAL HYPERTENSION: Primary | ICD-10-CM

## 2024-10-28 DIAGNOSIS — N39.3 FEMALE STRESS INCONTINENCE: ICD-10-CM

## 2024-10-28 PROCEDURE — 3008F BODY MASS INDEX DOCD: CPT | Performed by: NEUROLOGICAL SURGERY

## 2024-10-28 PROCEDURE — 3051F HG A1C>EQUAL 7.0%<8.0%: CPT | Performed by: NEUROLOGICAL SURGERY

## 2024-10-28 PROCEDURE — 3049F LDL-C 100-129 MG/DL: CPT | Performed by: NEUROLOGICAL SURGERY

## 2024-10-28 PROCEDURE — 1036F TOBACCO NON-USER: CPT | Performed by: NEUROLOGICAL SURGERY

## 2024-10-28 PROCEDURE — 99212 OFFICE O/P EST SF 10 MIN: CPT | Performed by: NEUROLOGICAL SURGERY

## 2024-10-28 PROCEDURE — 3077F SYST BP >= 140 MM HG: CPT | Performed by: NEUROLOGICAL SURGERY

## 2024-10-28 PROCEDURE — 3080F DIAST BP >= 90 MM HG: CPT | Performed by: NEUROLOGICAL SURGERY

## 2024-10-28 RX ORDER — CAMPHOR/EUCALYPTUS/MENTHOL
LIQUID (ML) MISCELLANEOUS
Qty: 200 EACH | Refills: 3 | Status: SHIPPED | OUTPATIENT
Start: 2024-10-28

## 2024-10-28 ASSESSMENT — PAIN SCALES - GENERAL: PAINLEVEL_OUTOF10: 0-NO PAIN

## 2024-10-29 ENCOUNTER — APPOINTMENT (OUTPATIENT)
Dept: RADIOLOGY | Facility: HOSPITAL | Age: 49
End: 2024-10-29
Payer: MEDICAID

## 2024-10-29 ENCOUNTER — HOSPITAL ENCOUNTER (INPATIENT)
Facility: HOSPITAL | Age: 49
LOS: 2 days | Discharge: HOME | End: 2024-10-31
Attending: STUDENT IN AN ORGANIZED HEALTH CARE EDUCATION/TRAINING PROGRAM | Admitting: NEUROLOGICAL SURGERY
Payer: MEDICAID

## 2024-10-29 ENCOUNTER — APPOINTMENT (OUTPATIENT)
Dept: NEUROLOGY | Facility: CLINIC | Age: 49
End: 2024-10-29
Payer: MEDICAID

## 2024-10-29 DIAGNOSIS — G89.18 POSTOPERATIVE PAIN: ICD-10-CM

## 2024-10-29 DIAGNOSIS — G43.711 CHRONIC MIGRAINE WITHOUT AURA, INTRACTABLE, WITH STATUS MIGRAINOSUS: ICD-10-CM

## 2024-10-29 DIAGNOSIS — E11.9 TYPE 2 DIABETES MELLITUS WITHOUT COMPLICATION, UNSPECIFIED WHETHER LONG TERM INSULIN USE (MULTI): ICD-10-CM

## 2024-10-29 DIAGNOSIS — G93.2 IDIOPATHIC INTRACRANIAL HYPERTENSION: Primary | ICD-10-CM

## 2024-10-29 LAB
ALBUMIN SERPL BCP-MCNC: 3.9 G/DL (ref 3.4–5)
ALP SERPL-CCNC: 75 U/L (ref 33–110)
ALT SERPL W P-5'-P-CCNC: 18 U/L (ref 7–45)
ANION GAP SERPL CALC-SCNC: 14 MMOL/L (ref 10–20)
APPEARANCE UR: CLEAR
APTT PPP: 29 SECONDS (ref 27–38)
AST SERPL W P-5'-P-CCNC: 14 U/L (ref 9–39)
BASOPHILS # BLD AUTO: 0.03 X10*3/UL (ref 0–0.1)
BASOPHILS NFR BLD AUTO: 0.6 %
BILIRUB SERPL-MCNC: 0.2 MG/DL (ref 0–1.2)
BILIRUB UR STRIP.AUTO-MCNC: NEGATIVE MG/DL
BLOOD EXPIRATION DATE: NORMAL
BLOOD EXPIRATION DATE: NORMAL
BUN SERPL-MCNC: 17 MG/DL (ref 6–23)
CALCIUM SERPL-MCNC: 9.2 MG/DL (ref 8.6–10.6)
CHLORIDE SERPL-SCNC: 102 MMOL/L (ref 98–107)
CO2 SERPL-SCNC: 26 MMOL/L (ref 21–32)
COLOR UR: YELLOW
CREAT SERPL-MCNC: 0.77 MG/DL (ref 0.5–1.05)
DISPENSE STATUS: NORMAL
DISPENSE STATUS: NORMAL
EGFRCR SERPLBLD CKD-EPI 2021: >90 ML/MIN/1.73M*2
EOSINOPHIL # BLD AUTO: 0.04 X10*3/UL (ref 0–0.7)
EOSINOPHIL NFR BLD AUTO: 0.7 %
ERYTHROCYTE [DISTWIDTH] IN BLOOD BY AUTOMATED COUNT: 15.4 % (ref 11.5–14.5)
GLUCOSE SERPL-MCNC: 220 MG/DL (ref 74–99)
GLUCOSE UR STRIP.AUTO-MCNC: NORMAL MG/DL
HCG UR QL IA.RAPID: NEGATIVE
HCT VFR BLD AUTO: 37.4 % (ref 36–46)
HGB BLD-MCNC: 11.9 G/DL (ref 12–16)
IMM GRANULOCYTES # BLD AUTO: 0.01 X10*3/UL (ref 0–0.7)
IMM GRANULOCYTES NFR BLD AUTO: 0.2 % (ref 0–0.9)
INR PPP: 0.9 (ref 0.9–1.1)
KETONES UR STRIP.AUTO-MCNC: NEGATIVE MG/DL
LEUKOCYTE ESTERASE UR QL STRIP.AUTO: NEGATIVE
LYMPHOCYTES # BLD AUTO: 1.32 X10*3/UL (ref 1.2–4.8)
LYMPHOCYTES NFR BLD AUTO: 24.7 %
MCH RBC QN AUTO: 27.9 PG (ref 26–34)
MCHC RBC AUTO-ENTMCNC: 31.8 G/DL (ref 32–36)
MCV RBC AUTO: 88 FL (ref 80–100)
MONOCYTES # BLD AUTO: 0.36 X10*3/UL (ref 0.1–1)
MONOCYTES NFR BLD AUTO: 6.7 %
MUCOUS THREADS #/AREA URNS AUTO: NORMAL /LPF
NEUTROPHILS # BLD AUTO: 3.58 X10*3/UL (ref 1.2–7.7)
NEUTROPHILS NFR BLD AUTO: 67.1 %
NITRITE UR QL STRIP.AUTO: NEGATIVE
NRBC BLD-RTO: 0 /100 WBCS (ref 0–0)
PH UR STRIP.AUTO: 6 [PH]
PLATELET # BLD AUTO: 273 X10*3/UL (ref 150–450)
POTASSIUM SERPL-SCNC: 4.1 MMOL/L (ref 3.5–5.3)
PRODUCT BLOOD TYPE: 6200
PRODUCT BLOOD TYPE: 6200
PRODUCT CODE: NORMAL
PRODUCT CODE: NORMAL
PROT SERPL-MCNC: 7.7 G/DL (ref 6.4–8.2)
PROT UR STRIP.AUTO-MCNC: ABNORMAL MG/DL
PROTHROMBIN TIME: 10.2 SECONDS (ref 9.8–12.8)
RBC # BLD AUTO: 4.26 X10*6/UL (ref 4–5.2)
RBC # UR STRIP.AUTO: NEGATIVE /UL
RBC #/AREA URNS AUTO: NORMAL /HPF
SODIUM SERPL-SCNC: 138 MMOL/L (ref 136–145)
SP GR UR STRIP.AUTO: 1.03
SQUAMOUS #/AREA URNS AUTO: NORMAL /HPF
UNIT ABO: NORMAL
UNIT ABO: NORMAL
UNIT NUMBER: NORMAL
UNIT NUMBER: NORMAL
UNIT RH: NORMAL
UNIT RH: NORMAL
UNIT VOLUME: 350
UNIT VOLUME: 350
UROBILINOGEN UR STRIP.AUTO-MCNC: ABNORMAL MG/DL
WBC # BLD AUTO: 5.3 X10*3/UL (ref 4.4–11.3)
WBC #/AREA URNS AUTO: NORMAL /HPF
XM INTEP: NORMAL
XM INTEP: NORMAL

## 2024-10-29 PROCEDURE — 71045 X-RAY EXAM CHEST 1 VIEW: CPT

## 2024-10-29 PROCEDURE — 85025 COMPLETE CBC W/AUTO DIFF WBC: CPT | Performed by: EMERGENCY MEDICINE

## 2024-10-29 PROCEDURE — 86901 BLOOD TYPING SEROLOGIC RH(D): CPT | Performed by: STUDENT IN AN ORGANIZED HEALTH CARE EDUCATION/TRAINING PROGRAM

## 2024-10-29 PROCEDURE — 2500000001 HC RX 250 WO HCPCS SELF ADMINISTERED DRUGS (ALT 637 FOR MEDICARE OP): Performed by: STUDENT IN AN ORGANIZED HEALTH CARE EDUCATION/TRAINING PROGRAM

## 2024-10-29 PROCEDURE — 70460 CT HEAD/BRAIN W/DYE: CPT

## 2024-10-29 PROCEDURE — 80053 COMPREHEN METABOLIC PANEL: CPT | Performed by: EMERGENCY MEDICINE

## 2024-10-29 PROCEDURE — 85025 COMPLETE CBC W/AUTO DIFF WBC: CPT | Performed by: NEUROLOGICAL SURGERY

## 2024-10-29 PROCEDURE — 99285 EMERGENCY DEPT VISIT HI MDM: CPT | Performed by: STUDENT IN AN ORGANIZED HEALTH CARE EDUCATION/TRAINING PROGRAM

## 2024-10-29 PROCEDURE — 2500000002 HC RX 250 W HCPCS SELF ADMINISTERED DRUGS (ALT 637 FOR MEDICARE OP, ALT 636 FOR OP/ED): Performed by: STUDENT IN AN ORGANIZED HEALTH CARE EDUCATION/TRAINING PROGRAM

## 2024-10-29 PROCEDURE — 80053 COMPREHEN METABOLIC PANEL: CPT | Performed by: NEUROLOGICAL SURGERY

## 2024-10-29 PROCEDURE — 81001 URINALYSIS AUTO W/SCOPE: CPT | Performed by: STUDENT IN AN ORGANIZED HEALTH CARE EDUCATION/TRAINING PROGRAM

## 2024-10-29 PROCEDURE — 1100000001 HC PRIVATE ROOM DAILY

## 2024-10-29 PROCEDURE — 81025 URINE PREGNANCY TEST: CPT | Performed by: STUDENT IN AN ORGANIZED HEALTH CARE EDUCATION/TRAINING PROGRAM

## 2024-10-29 PROCEDURE — 99285 EMERGENCY DEPT VISIT HI MDM: CPT | Mod: 25

## 2024-10-29 PROCEDURE — 85610 PROTHROMBIN TIME: CPT | Performed by: STUDENT IN AN ORGANIZED HEALTH CARE EDUCATION/TRAINING PROGRAM

## 2024-10-29 RX ORDER — LEVETIRACETAM 750 MG/1
1500 TABLET ORAL 2 TIMES DAILY
Status: DISCONTINUED | OUTPATIENT
Start: 2024-10-29 | End: 2024-10-31 | Stop reason: HOSPADM

## 2024-10-29 RX ORDER — ONDANSETRON 4 MG/1
4 TABLET, FILM COATED ORAL EVERY 8 HOURS PRN
Status: DISCONTINUED | OUTPATIENT
Start: 2024-10-29 | End: 2024-10-30

## 2024-10-29 RX ORDER — DIVALPROEX SODIUM 500 MG/1
500 TABLET, FILM COATED, EXTENDED RELEASE ORAL 2 TIMES DAILY
Status: DISCONTINUED | OUTPATIENT
Start: 2024-10-29 | End: 2024-10-31 | Stop reason: HOSPADM

## 2024-10-29 RX ORDER — CHOLECALCIFEROL (VITAMIN D3) 25 MCG
5000 TABLET ORAL DAILY
Status: DISCONTINUED | OUTPATIENT
Start: 2024-10-29 | End: 2024-10-31 | Stop reason: HOSPADM

## 2024-10-29 RX ORDER — AMOXICILLIN 250 MG
2 CAPSULE ORAL 2 TIMES DAILY
Status: DISCONTINUED | OUTPATIENT
Start: 2024-10-29 | End: 2024-10-30

## 2024-10-29 RX ORDER — CARBOXYMETHYLCELLULOSE SODIUM 10 MG/ML
2 GEL OPHTHALMIC 3 TIMES DAILY PRN
Status: DISCONTINUED | OUTPATIENT
Start: 2024-10-29 | End: 2024-10-29

## 2024-10-29 RX ORDER — FLUTICASONE FUROATE AND VILANTEROL 200; 25 UG/1; UG/1
1 POWDER RESPIRATORY (INHALATION)
Status: DISCONTINUED | OUTPATIENT
Start: 2024-10-29 | End: 2024-10-31 | Stop reason: HOSPADM

## 2024-10-29 RX ORDER — METOCLOPRAMIDE 10 MG/1
10 TABLET ORAL EVERY 6 HOURS PRN
Status: DISCONTINUED | OUTPATIENT
Start: 2024-10-29 | End: 2024-10-31 | Stop reason: HOSPADM

## 2024-10-29 RX ORDER — PROPRANOLOL HYDROCHLORIDE 60 MG/1
60 CAPSULE, EXTENDED RELEASE ORAL DAILY
Status: DISCONTINUED | OUTPATIENT
Start: 2024-10-29 | End: 2024-10-31 | Stop reason: HOSPADM

## 2024-10-29 RX ORDER — PANTOPRAZOLE SODIUM 40 MG/1
40 TABLET, DELAYED RELEASE ORAL
Status: DISCONTINUED | OUTPATIENT
Start: 2024-10-29 | End: 2024-10-31 | Stop reason: HOSPADM

## 2024-10-29 RX ORDER — ROPINIROLE 0.5 MG/1
0.5 TABLET, FILM COATED ORAL
Status: DISCONTINUED | OUTPATIENT
Start: 2024-10-30 | End: 2024-10-31 | Stop reason: HOSPADM

## 2024-10-29 RX ORDER — OXYCODONE HYDROCHLORIDE 5 MG/1
5 TABLET ORAL EVERY 4 HOURS PRN
Status: DISCONTINUED | OUTPATIENT
Start: 2024-10-29 | End: 2024-10-30

## 2024-10-29 RX ORDER — AMITRIPTYLINE HYDROCHLORIDE 100 MG/1
100 TABLET ORAL NIGHTLY
Status: DISCONTINUED | OUTPATIENT
Start: 2024-10-29 | End: 2024-10-31 | Stop reason: HOSPADM

## 2024-10-29 RX ORDER — METOCLOPRAMIDE HYDROCHLORIDE 5 MG/ML
10 INJECTION INTRAMUSCULAR; INTRAVENOUS EVERY 6 HOURS PRN
Status: DISCONTINUED | OUTPATIENT
Start: 2024-10-29 | End: 2024-10-31 | Stop reason: HOSPADM

## 2024-10-29 RX ORDER — NALOXONE HYDROCHLORIDE 0.4 MG/ML
0.2 INJECTION, SOLUTION INTRAMUSCULAR; INTRAVENOUS; SUBCUTANEOUS EVERY 5 MIN PRN
Status: DISCONTINUED | OUTPATIENT
Start: 2024-10-29 | End: 2024-10-30

## 2024-10-29 RX ORDER — FLUTICASONE PROPIONATE 50 MCG
1 SPRAY, SUSPENSION (ML) NASAL DAILY
Status: DISCONTINUED | OUTPATIENT
Start: 2024-10-29 | End: 2024-10-31 | Stop reason: HOSPADM

## 2024-10-29 RX ORDER — ROPINIROLE 1 MG/1
1 TABLET, FILM COATED ORAL
Status: DISCONTINUED | OUTPATIENT
Start: 2024-10-29 | End: 2024-10-31 | Stop reason: HOSPADM

## 2024-10-29 RX ORDER — EZETIMIBE 10 MG/1
10 TABLET ORAL DAILY
Status: DISCONTINUED | OUTPATIENT
Start: 2024-10-29 | End: 2024-10-31 | Stop reason: HOSPADM

## 2024-10-29 RX ORDER — LACOSAMIDE 50 MG/1
50 TABLET ORAL EVERY 12 HOURS
Status: DISCONTINUED | OUTPATIENT
Start: 2024-10-29 | End: 2024-10-31 | Stop reason: HOSPADM

## 2024-10-29 RX ORDER — TALC
3 POWDER (GRAM) TOPICAL NIGHTLY PRN
Status: DISCONTINUED | OUTPATIENT
Start: 2024-10-29 | End: 2024-10-31 | Stop reason: HOSPADM

## 2024-10-29 RX ORDER — CLONAZEPAM 0.5 MG/1
0.5 TABLET ORAL 2 TIMES DAILY
Status: DISCONTINUED | OUTPATIENT
Start: 2024-10-29 | End: 2024-10-31 | Stop reason: HOSPADM

## 2024-10-29 RX ORDER — ONDANSETRON HYDROCHLORIDE 2 MG/ML
4 INJECTION, SOLUTION INTRAVENOUS EVERY 8 HOURS PRN
Status: DISCONTINUED | OUTPATIENT
Start: 2024-10-29 | End: 2024-10-30

## 2024-10-29 RX ORDER — LACOSAMIDE 100 MG/1
200 TABLET ORAL 2 TIMES DAILY
Status: DISCONTINUED | OUTPATIENT
Start: 2024-10-29 | End: 2024-10-31 | Stop reason: HOSPADM

## 2024-10-29 RX ORDER — FUROSEMIDE 20 MG/1
20 TABLET ORAL 2 TIMES DAILY
Status: DISCONTINUED | OUTPATIENT
Start: 2024-10-29 | End: 2024-10-31 | Stop reason: HOSPADM

## 2024-10-29 RX ORDER — AZELASTINE 1 MG/ML
1 SPRAY, METERED NASAL 2 TIMES DAILY
Status: DISCONTINUED | OUTPATIENT
Start: 2024-10-29 | End: 2024-10-31 | Stop reason: HOSPADM

## 2024-10-29 RX ORDER — MONTELUKAST SODIUM 10 MG/1
10 TABLET ORAL NIGHTLY
Status: DISCONTINUED | OUTPATIENT
Start: 2024-10-29 | End: 2024-10-31 | Stop reason: HOSPADM

## 2024-10-29 RX ORDER — IPRATROPIUM BROMIDE AND ALBUTEROL SULFATE 2.5; .5 MG/3ML; MG/3ML
3 SOLUTION RESPIRATORY (INHALATION) 4 TIMES DAILY PRN
Status: DISCONTINUED | OUTPATIENT
Start: 2024-10-29 | End: 2024-10-31 | Stop reason: HOSPADM

## 2024-10-29 RX ORDER — OXYCODONE HYDROCHLORIDE 5 MG/1
2.5 TABLET ORAL EVERY 4 HOURS PRN
Status: DISCONTINUED | OUTPATIENT
Start: 2024-10-29 | End: 2024-10-30

## 2024-10-29 RX ORDER — POLYETHYLENE GLYCOL 3350 17 G/17G
17 POWDER, FOR SOLUTION ORAL DAILY
Status: DISCONTINUED | OUTPATIENT
Start: 2024-10-29 | End: 2024-10-30

## 2024-10-29 RX ORDER — FOLIC ACID 1 MG/1
1 TABLET ORAL DAILY
Status: DISCONTINUED | OUTPATIENT
Start: 2024-10-29 | End: 2024-10-31 | Stop reason: HOSPADM

## 2024-10-29 RX ORDER — LEVOTHYROXINE SODIUM 75 UG/1
75 TABLET ORAL
Status: DISCONTINUED | OUTPATIENT
Start: 2024-10-30 | End: 2024-10-31 | Stop reason: HOSPADM

## 2024-10-29 ASSESSMENT — ACTIVITIES OF DAILY LIVING (ADL)
ADL_BEFORE_ADMISSION: RIGHT
ADEQUATE_TO_COMPLETE_ADL: NO
TOILETING: INDEPENDENT
ASSISTIVE_DEVICES: OTHER
DRESSING: NEEDS ASSISTANCE
HOW_WELL_CAN_YOU_BATHE_YOURSELF: NEED SOME HELP
ADEQUATE_TO_COMPLETE_ADL: NO
HOW_WELL_CAN_YOU_COMPLETE_GROOMING_TASKS: NEED SOME HELP
FEEDING: INDEPENDENT
HOW_WELL_CAN_YOU_USE_BATHROOM_BY_YOURSELF: INDEPENDENTLY
HEARING_RIGHT_EAR: DIFFICULTY WITH NOISE
HOW_WELL_CAN_YOU_FEED_YOURSELF: INDEPENDENTLY
WALKS_IN_HOME: INDEPENDENTLY
HEARING_LEFT_EAR: DIFFICULTY WITH NOISE
BATHING: NEEDS ASSISTANCE
ADL_BEFORE_ADMISSION: INDEPENDENTLY
ASSISTIVE_DEVICE: OTHER (COMMENT)
HOW_WELL_CAN_YOU_DRESS_YOURSELF: NEED SOME HELP

## 2024-10-29 ASSESSMENT — COGNITIVE AND FUNCTIONAL STATUS - GENERAL
MOBILITY SCORE: 24
DAILY ACTIVITIY SCORE: 24

## 2024-10-30 ENCOUNTER — ANESTHESIA EVENT (OUTPATIENT)
Dept: OPERATING ROOM | Facility: HOSPITAL | Age: 49
End: 2024-10-30
Payer: MEDICAID

## 2024-10-30 ENCOUNTER — APPOINTMENT (OUTPATIENT)
Dept: CARDIOLOGY | Facility: HOSPITAL | Age: 49
End: 2024-10-30
Payer: MEDICAID

## 2024-10-30 ENCOUNTER — ANESTHESIA (OUTPATIENT)
Dept: OPERATING ROOM | Facility: HOSPITAL | Age: 49
End: 2024-10-30
Payer: MEDICAID

## 2024-10-30 LAB
ABO GROUP (TYPE) IN BLOOD: NORMAL
ANTIBODY SCREEN: NORMAL
GLUCOSE BLD MANUAL STRIP-MCNC: 182 MG/DL (ref 74–99)
GLUCOSE BLD MANUAL STRIP-MCNC: 204 MG/DL (ref 74–99)
HOLD SPECIMEN: NORMAL
RH FACTOR (ANTIGEN D): NORMAL

## 2024-10-30 PROCEDURE — 3700000001 HC GENERAL ANESTHESIA TIME - INITIAL BASE CHARGE: Performed by: NEUROLOGICAL SURGERY

## 2024-10-30 PROCEDURE — 2500000005 HC RX 250 GENERAL PHARMACY W/O HCPCS: Performed by: NEUROLOGICAL SURGERY

## 2024-10-30 PROCEDURE — 3700000002 HC GENERAL ANESTHESIA TIME - EACH INCREMENTAL 1 MINUTE: Performed by: NEUROLOGICAL SURGERY

## 2024-10-30 PROCEDURE — 2780000003 HC OR 278 NO HCPCS: Performed by: NEUROLOGICAL SURGERY

## 2024-10-30 PROCEDURE — 2500000004 HC RX 250 GENERAL PHARMACY W/ HCPCS (ALT 636 FOR OP/ED): Performed by: NEUROLOGICAL SURGERY

## 2024-10-30 PROCEDURE — C1889 IMPLANT/INSERT DEVICE, NOC: HCPCS | Performed by: NEUROLOGICAL SURGERY

## 2024-10-30 PROCEDURE — 93005 ELECTROCARDIOGRAM TRACING: CPT

## 2024-10-30 PROCEDURE — 2500000004 HC RX 250 GENERAL PHARMACY W/ HCPCS (ALT 636 FOR OP/ED): Performed by: STUDENT IN AN ORGANIZED HEALTH CARE EDUCATION/TRAINING PROGRAM

## 2024-10-30 PROCEDURE — 2500000004 HC RX 250 GENERAL PHARMACY W/ HCPCS (ALT 636 FOR OP/ED)

## 2024-10-30 PROCEDURE — 00W63JZ REVISION OF SYNTHETIC SUBSTITUTE IN CEREBRAL VENTRICLE, PERCUTANEOUS APPROACH: ICD-10-PCS | Performed by: NEUROLOGICAL SURGERY

## 2024-10-30 PROCEDURE — 3600000004 HC OR TIME - INITIAL BASE CHARGE - PROCEDURE LEVEL FOUR: Performed by: NEUROLOGICAL SURGERY

## 2024-10-30 PROCEDURE — 2500000005 HC RX 250 GENERAL PHARMACY W/O HCPCS

## 2024-10-30 PROCEDURE — 1200000002 HC GENERAL ROOM WITH TELEMETRY DAILY

## 2024-10-30 PROCEDURE — 7100000002 HC RECOVERY ROOM TIME - EACH INCREMENTAL 1 MINUTE: Performed by: NEUROLOGICAL SURGERY

## 2024-10-30 PROCEDURE — 3600000009 HC OR TIME - EACH INCREMENTAL 1 MINUTE - PROCEDURE LEVEL FOUR: Performed by: NEUROLOGICAL SURGERY

## 2024-10-30 PROCEDURE — C1894 INTRO/SHEATH, NON-LASER: HCPCS | Performed by: NEUROLOGICAL SURGERY

## 2024-10-30 PROCEDURE — 7100000001 HC RECOVERY ROOM TIME - INITIAL BASE CHARGE: Performed by: NEUROLOGICAL SURGERY

## 2024-10-30 PROCEDURE — 0WWG4JZ REVISION OF SYNTHETIC SUBSTITUTE IN PERITONEAL CAVITY, PERCUTANEOUS ENDOSCOPIC APPROACH: ICD-10-PCS | Performed by: NEUROLOGICAL SURGERY

## 2024-10-30 PROCEDURE — 2720000007 HC OR 272 NO HCPCS: Performed by: NEUROLOGICAL SURGERY

## 2024-10-30 PROCEDURE — 2500000002 HC RX 250 W HCPCS SELF ADMINISTERED DRUGS (ALT 637 FOR MEDICARE OP, ALT 636 FOR OP/ED): Performed by: STUDENT IN AN ORGANIZED HEALTH CARE EDUCATION/TRAINING PROGRAM

## 2024-10-30 PROCEDURE — 93010 ELECTROCARDIOGRAM REPORT: CPT | Performed by: INTERNAL MEDICINE

## 2024-10-30 PROCEDURE — 2500000001 HC RX 250 WO HCPCS SELF ADMINISTERED DRUGS (ALT 637 FOR MEDICARE OP): Performed by: NEUROLOGICAL SURGERY

## 2024-10-30 PROCEDURE — 62230 REPLACE/REVISE BRAIN SHUNT: CPT | Performed by: NEUROLOGICAL SURGERY

## 2024-10-30 PROCEDURE — 2500000001 HC RX 250 WO HCPCS SELF ADMINISTERED DRUGS (ALT 637 FOR MEDICARE OP): Performed by: STUDENT IN AN ORGANIZED HEALTH CARE EDUCATION/TRAINING PROGRAM

## 2024-10-30 PROCEDURE — 82947 ASSAY GLUCOSE BLOOD QUANT: CPT

## 2024-10-30 DEVICE — IMPLANTABLE DEVICE: Type: IMPLANTABLE DEVICE | Site: CRANIAL | Status: FUNCTIONAL

## 2024-10-30 RX ORDER — POLYETHYLENE GLYCOL 3350 17 G/17G
17 POWDER, FOR SOLUTION ORAL DAILY
Status: DISCONTINUED | OUTPATIENT
Start: 2024-10-30 | End: 2024-10-31 | Stop reason: HOSPADM

## 2024-10-30 RX ORDER — ROCURONIUM BROMIDE 10 MG/ML
INJECTION, SOLUTION INTRAVENOUS AS NEEDED
Status: DISCONTINUED | OUTPATIENT
Start: 2024-10-30 | End: 2024-10-30

## 2024-10-30 RX ORDER — OXYCODONE HYDROCHLORIDE 5 MG/1
5 TABLET ORAL EVERY 4 HOURS PRN
Status: DISCONTINUED | OUTPATIENT
Start: 2024-10-30 | End: 2024-10-31 | Stop reason: HOSPADM

## 2024-10-30 RX ORDER — MORPHINE SULFATE 4 MG/ML
2 INJECTION INTRAVENOUS EVERY 2 HOUR PRN
Status: DISCONTINUED | OUTPATIENT
Start: 2024-10-30 | End: 2024-10-31 | Stop reason: HOSPADM

## 2024-10-30 RX ORDER — OXYCODONE HYDROCHLORIDE 5 MG/1
5 TABLET ORAL EVERY 4 HOURS PRN
Status: DISCONTINUED | OUTPATIENT
Start: 2024-10-30 | End: 2024-10-30 | Stop reason: HOSPADM

## 2024-10-30 RX ORDER — OXYCODONE HYDROCHLORIDE 5 MG/1
10 TABLET ORAL EVERY 4 HOURS PRN
Status: DISCONTINUED | OUTPATIENT
Start: 2024-10-30 | End: 2024-10-30 | Stop reason: HOSPADM

## 2024-10-30 RX ORDER — ONDANSETRON HYDROCHLORIDE 2 MG/ML
4 INJECTION, SOLUTION INTRAVENOUS EVERY 8 HOURS PRN
Status: DISCONTINUED | OUTPATIENT
Start: 2024-10-30 | End: 2024-10-31 | Stop reason: HOSPADM

## 2024-10-30 RX ORDER — LABETALOL HYDROCHLORIDE 5 MG/ML
10 INJECTION, SOLUTION INTRAVENOUS EVERY 10 MIN PRN
Status: DISCONTINUED | OUTPATIENT
Start: 2024-10-30 | End: 2024-10-31 | Stop reason: HOSPADM

## 2024-10-30 RX ORDER — DROPERIDOL 2.5 MG/ML
0.62 INJECTION, SOLUTION INTRAMUSCULAR; INTRAVENOUS ONCE AS NEEDED
Status: COMPLETED | OUTPATIENT
Start: 2024-10-30 | End: 2024-10-30

## 2024-10-30 RX ORDER — FENTANYL CITRATE 50 UG/ML
25 INJECTION, SOLUTION INTRAMUSCULAR; INTRAVENOUS EVERY 5 MIN PRN
Status: DISCONTINUED | OUTPATIENT
Start: 2024-10-30 | End: 2024-10-30 | Stop reason: HOSPADM

## 2024-10-30 RX ORDER — FENTANYL CITRATE 50 UG/ML
INJECTION, SOLUTION INTRAMUSCULAR; INTRAVENOUS AS NEEDED
Status: DISCONTINUED | OUTPATIENT
Start: 2024-10-30 | End: 2024-10-30

## 2024-10-30 RX ORDER — SODIUM CHLORIDE, SODIUM LACTATE, POTASSIUM CHLORIDE, CALCIUM CHLORIDE 600; 310; 30; 20 MG/100ML; MG/100ML; MG/100ML; MG/100ML
INJECTION, SOLUTION INTRAVENOUS CONTINUOUS PRN
Status: DISCONTINUED | OUTPATIENT
Start: 2024-10-30 | End: 2024-10-30

## 2024-10-30 RX ORDER — SODIUM CHLORIDE, SODIUM LACTATE, POTASSIUM CHLORIDE, CALCIUM CHLORIDE 600; 310; 30; 20 MG/100ML; MG/100ML; MG/100ML; MG/100ML
100 INJECTION, SOLUTION INTRAVENOUS CONTINUOUS
Status: DISCONTINUED | OUTPATIENT
Start: 2024-10-30 | End: 2024-10-30 | Stop reason: HOSPADM

## 2024-10-30 RX ORDER — SODIUM CHLORIDE 9 MG/ML
75 INJECTION, SOLUTION INTRAVENOUS CONTINUOUS
Status: DISCONTINUED | OUTPATIENT
Start: 2024-10-30 | End: 2024-10-31 | Stop reason: HOSPADM

## 2024-10-30 RX ORDER — MIDAZOLAM HYDROCHLORIDE 1 MG/ML
INJECTION INTRAMUSCULAR; INTRAVENOUS AS NEEDED
Status: DISCONTINUED | OUTPATIENT
Start: 2024-10-30 | End: 2024-10-30

## 2024-10-30 RX ORDER — HYDRALAZINE HYDROCHLORIDE 20 MG/ML
10 INJECTION INTRAMUSCULAR; INTRAVENOUS
Status: DISCONTINUED | OUTPATIENT
Start: 2024-10-30 | End: 2024-10-31 | Stop reason: HOSPADM

## 2024-10-30 RX ORDER — ONDANSETRON HYDROCHLORIDE 2 MG/ML
INJECTION, SOLUTION INTRAVENOUS AS NEEDED
Status: DISCONTINUED | OUTPATIENT
Start: 2024-10-30 | End: 2024-10-30

## 2024-10-30 RX ORDER — LABETALOL HYDROCHLORIDE 5 MG/ML
20 INJECTION, SOLUTION INTRAVENOUS ONCE
Status: COMPLETED | OUTPATIENT
Start: 2024-10-30 | End: 2024-10-30

## 2024-10-30 RX ORDER — PHENYLEPHRINE HCL IN 0.9% NACL 0.4MG/10ML
SYRINGE (ML) INTRAVENOUS AS NEEDED
Status: DISCONTINUED | OUTPATIENT
Start: 2024-10-30 | End: 2024-10-30

## 2024-10-30 RX ORDER — LIDOCAINE HYDROCHLORIDE 10 MG/ML
0.1 INJECTION, SOLUTION INFILTRATION; PERINEURAL ONCE
Status: DISCONTINUED | OUTPATIENT
Start: 2024-10-30 | End: 2024-10-30 | Stop reason: HOSPADM

## 2024-10-30 RX ORDER — ONDANSETRON HYDROCHLORIDE 2 MG/ML
4 INJECTION, SOLUTION INTRAVENOUS ONCE AS NEEDED
Status: DISCONTINUED | OUTPATIENT
Start: 2024-10-30 | End: 2024-10-30 | Stop reason: HOSPADM

## 2024-10-30 RX ORDER — OXYCODONE HYDROCHLORIDE 5 MG/1
10 TABLET ORAL EVERY 4 HOURS PRN
Status: DISCONTINUED | OUTPATIENT
Start: 2024-10-30 | End: 2024-10-31 | Stop reason: HOSPADM

## 2024-10-30 RX ORDER — NORETHINDRONE AND ETHINYL ESTRADIOL 0.5-0.035
KIT ORAL AS NEEDED
Status: DISCONTINUED | OUTPATIENT
Start: 2024-10-30 | End: 2024-10-30

## 2024-10-30 RX ORDER — ENOXAPARIN SODIUM 100 MG/ML
40 INJECTION SUBCUTANEOUS EVERY 12 HOURS SCHEDULED
Status: DISCONTINUED | OUTPATIENT
Start: 2024-11-01 | End: 2024-10-31 | Stop reason: HOSPADM

## 2024-10-30 RX ORDER — LIDOCAINE HYDROCHLORIDE AND EPINEPHRINE 5; 5 MG/ML; UG/ML
INJECTION, SOLUTION INFILTRATION; PERINEURAL AS NEEDED
Status: DISCONTINUED | OUTPATIENT
Start: 2024-10-30 | End: 2024-10-30 | Stop reason: HOSPADM

## 2024-10-30 RX ORDER — CEFAZOLIN 1 G/1
INJECTION, POWDER, FOR SOLUTION INTRAVENOUS AS NEEDED
Status: DISCONTINUED | OUTPATIENT
Start: 2024-10-30 | End: 2024-10-30

## 2024-10-30 RX ORDER — NALOXONE HYDROCHLORIDE 0.4 MG/ML
0.2 INJECTION, SOLUTION INTRAMUSCULAR; INTRAVENOUS; SUBCUTANEOUS EVERY 5 MIN PRN
Status: DISCONTINUED | OUTPATIENT
Start: 2024-10-30 | End: 2024-10-31 | Stop reason: HOSPADM

## 2024-10-30 RX ORDER — OXYCODONE HYDROCHLORIDE 5 MG/1
2.5 TABLET ORAL EVERY 4 HOURS PRN
Status: DISCONTINUED | OUTPATIENT
Start: 2024-10-30 | End: 2024-10-31 | Stop reason: HOSPADM

## 2024-10-30 RX ORDER — TIZANIDINE 2 MG/1
2 TABLET ORAL EVERY 8 HOURS PRN
COMMUNITY

## 2024-10-30 RX ORDER — ONDANSETRON 4 MG/1
4 TABLET, FILM COATED ORAL EVERY 8 HOURS PRN
Status: DISCONTINUED | OUTPATIENT
Start: 2024-10-30 | End: 2024-10-31 | Stop reason: HOSPADM

## 2024-10-30 RX ORDER — LIDOCAINE HYDROCHLORIDE 20 MG/ML
INJECTION, SOLUTION INFILTRATION; PERINEURAL AS NEEDED
Status: DISCONTINUED | OUTPATIENT
Start: 2024-10-30 | End: 2024-10-30

## 2024-10-30 RX ORDER — PROPOFOL 10 MG/ML
INJECTION, EMULSION INTRAVENOUS AS NEEDED
Status: DISCONTINUED | OUTPATIENT
Start: 2024-10-30 | End: 2024-10-30

## 2024-10-30 SDOH — HEALTH STABILITY: MENTAL HEALTH: CURRENT SMOKER: 0

## 2024-10-30 ASSESSMENT — COGNITIVE AND FUNCTIONAL STATUS - GENERAL
DAILY ACTIVITIY SCORE: 24
MOBILITY SCORE: 23
CLIMB 3 TO 5 STEPS WITH RAILING: A LITTLE

## 2024-10-30 ASSESSMENT — PAIN SCALES - GENERAL
PAINLEVEL_OUTOF10: 0 - NO PAIN
PAINLEVEL_OUTOF10: 8
PAIN_LEVEL: 2
PAINLEVEL_OUTOF10: 8
PAINLEVEL_OUTOF10: 8
PAINLEVEL_OUTOF10: 0 - NO PAIN
PAINLEVEL_OUTOF10: 6
PAINLEVEL_OUTOF10: 3

## 2024-10-30 ASSESSMENT — PAIN - FUNCTIONAL ASSESSMENT
PAIN_FUNCTIONAL_ASSESSMENT: 0-10
PAIN_FUNCTIONAL_ASSESSMENT: UNABLE TO SELF-REPORT
PAIN_FUNCTIONAL_ASSESSMENT: 0-10

## 2024-10-31 VITALS
TEMPERATURE: 96.8 F | DIASTOLIC BLOOD PRESSURE: 91 MMHG | WEIGHT: 245 LBS | HEART RATE: 102 BPM | BODY MASS INDEX: 44.81 KG/M2 | OXYGEN SATURATION: 98 % | SYSTOLIC BLOOD PRESSURE: 155 MMHG | RESPIRATION RATE: 16 BRPM

## 2024-10-31 LAB
ALBUMIN SERPL BCP-MCNC: 4.1 G/DL (ref 3.4–5)
ANION GAP SERPL CALC-SCNC: 14 MMOL/L (ref 10–20)
BUN SERPL-MCNC: 12 MG/DL (ref 6–23)
CALCIUM SERPL-MCNC: 9 MG/DL (ref 8.6–10.6)
CHLORIDE SERPL-SCNC: 101 MMOL/L (ref 98–107)
CO2 SERPL-SCNC: 23 MMOL/L (ref 21–32)
CREAT SERPL-MCNC: 0.76 MG/DL (ref 0.5–1.05)
EGFRCR SERPLBLD CKD-EPI 2021: >90 ML/MIN/1.73M*2
ERYTHROCYTE [DISTWIDTH] IN BLOOD BY AUTOMATED COUNT: 15.2 % (ref 11.5–14.5)
GLUCOSE SERPL-MCNC: 266 MG/DL (ref 74–99)
HCT VFR BLD AUTO: 36.7 % (ref 36–46)
HGB BLD-MCNC: 11.6 G/DL (ref 12–16)
MCH RBC QN AUTO: 28.2 PG (ref 26–34)
MCHC RBC AUTO-ENTMCNC: 31.6 G/DL (ref 32–36)
MCV RBC AUTO: 89 FL (ref 80–100)
NRBC BLD-RTO: 0 /100 WBCS (ref 0–0)
PHOSPHATE SERPL-MCNC: 2.1 MG/DL (ref 2.5–4.9)
PLATELET # BLD AUTO: 285 X10*3/UL (ref 150–450)
POTASSIUM SERPL-SCNC: 3.7 MMOL/L (ref 3.5–5.3)
RBC # BLD AUTO: 4.11 X10*6/UL (ref 4–5.2)
SODIUM SERPL-SCNC: 134 MMOL/L (ref 136–145)
WBC # BLD AUTO: 8.9 X10*3/UL (ref 4.4–11.3)

## 2024-10-31 PROCEDURE — 2500000001 HC RX 250 WO HCPCS SELF ADMINISTERED DRUGS (ALT 637 FOR MEDICARE OP): Performed by: NEUROLOGICAL SURGERY

## 2024-10-31 PROCEDURE — 97165 OT EVAL LOW COMPLEX 30 MIN: CPT | Mod: GO

## 2024-10-31 PROCEDURE — 2500000002 HC RX 250 W HCPCS SELF ADMINISTERED DRUGS (ALT 637 FOR MEDICARE OP, ALT 636 FOR OP/ED): Performed by: NEUROLOGICAL SURGERY

## 2024-10-31 PROCEDURE — 80069 RENAL FUNCTION PANEL: CPT | Performed by: NEUROLOGICAL SURGERY

## 2024-10-31 PROCEDURE — 2500000004 HC RX 250 GENERAL PHARMACY W/ HCPCS (ALT 636 FOR OP/ED): Performed by: PHYSICIAN ASSISTANT

## 2024-10-31 PROCEDURE — 99239 HOSP IP/OBS DSCHRG MGMT >30: CPT | Performed by: NURSE PRACTITIONER

## 2024-10-31 PROCEDURE — 85027 COMPLETE CBC AUTOMATED: CPT | Performed by: NEUROLOGICAL SURGERY

## 2024-10-31 PROCEDURE — 97161 PT EVAL LOW COMPLEX 20 MIN: CPT | Mod: GP

## 2024-10-31 PROCEDURE — 2500000004 HC RX 250 GENERAL PHARMACY W/ HCPCS (ALT 636 FOR OP/ED): Performed by: NEUROLOGICAL SURGERY

## 2024-10-31 PROCEDURE — 36415 COLL VENOUS BLD VENIPUNCTURE: CPT | Performed by: NEUROLOGICAL SURGERY

## 2024-10-31 RX ORDER — HEPARIN 100 UNIT/ML
5 SYRINGE INTRAVENOUS ONCE
Status: COMPLETED | OUTPATIENT
Start: 2024-10-31 | End: 2024-10-31

## 2024-10-31 RX ORDER — OXYCODONE HYDROCHLORIDE 5 MG/1
5 TABLET ORAL EVERY 6 HOURS PRN
Qty: 28 TABLET | Refills: 0 | Status: SHIPPED | OUTPATIENT
Start: 2024-10-31 | End: 2024-11-08 | Stop reason: HOSPADM

## 2024-10-31 RX ORDER — DICLOFENAC SODIUM 50 MG/1
50 TABLET, DELAYED RELEASE ORAL 3 TIMES DAILY PRN
Status: ON HOLD
Start: 2024-10-31 | End: 2024-11-07 | Stop reason: ALTCHOICE

## 2024-10-31 RX ORDER — INSULIN GLARGINE 300 [IU]/ML
52 INJECTION, SOLUTION SUBCUTANEOUS EVERY MORNING
Start: 2024-10-31

## 2024-10-31 ASSESSMENT — COGNITIVE AND FUNCTIONAL STATUS - GENERAL
MOBILITY SCORE: 24
DAILY ACTIVITIY SCORE: 24
DAILY ACTIVITIY SCORE: 24
CLIMB 3 TO 5 STEPS WITH RAILING: A LITTLE
MOBILITY SCORE: 23

## 2024-10-31 ASSESSMENT — PAIN - FUNCTIONAL ASSESSMENT
PAIN_FUNCTIONAL_ASSESSMENT: 0-10

## 2024-10-31 ASSESSMENT — PAIN DESCRIPTION - DESCRIPTORS
DESCRIPTORS: BURNING;SHARP
DESCRIPTORS: ACHING

## 2024-10-31 ASSESSMENT — PAIN SCALES - GENERAL
PAINLEVEL_OUTOF10: 6
PAINLEVEL_OUTOF10: 5 - MODERATE PAIN
PAINLEVEL_OUTOF10: 2
PAINLEVEL_OUTOF10: 6
PAINLEVEL_OUTOF10: 0 - NO PAIN
PAINLEVEL_OUTOF10: 8

## 2024-10-31 ASSESSMENT — ACTIVITIES OF DAILY LIVING (ADL)
ADL_ASSISTANCE: NEEDS ASSISTANCE
EFFECT OF PAIN ON DAILY ACTIVITIES: SLEEPING

## 2024-11-01 LAB
ATRIAL RATE: 89 BPM
P AXIS: 55 DEGREES
P OFFSET: 195 MS
P ONSET: 132 MS
PR INTERVAL: 178 MS
Q ONSET: 221 MS
QRS COUNT: 15 BEATS
QRS DURATION: 88 MS
QT INTERVAL: 396 MS
QTC CALCULATION(BAZETT): 481 MS
QTC FREDERICIA: 451 MS
R AXIS: 21 DEGREES
T AXIS: 86 DEGREES
T OFFSET: 419 MS
VENTRICULAR RATE: 89 BPM

## 2024-11-05 ENCOUNTER — APPOINTMENT (OUTPATIENT)
Dept: OPHTHALMOLOGY | Facility: CLINIC | Age: 49
End: 2024-11-05
Payer: MEDICAID

## 2024-11-05 DIAGNOSIS — G93.2 IDIOPATHIC INTRACRANIAL HYPERTENSION: Primary | ICD-10-CM

## 2024-11-05 DIAGNOSIS — H47.012 NAION (NON-ARTERITIC ANTERIOR ISCHEMIC OPTIC NEUROPATHY), LEFT EYE: ICD-10-CM

## 2024-11-05 PROCEDURE — 92083 EXTENDED VISUAL FIELD XM: CPT | Performed by: PSYCHIATRY & NEUROLOGY

## 2024-11-05 PROCEDURE — 92133 CPTRZD OPH DX IMG PST SGM ON: CPT | Performed by: PSYCHIATRY & NEUROLOGY

## 2024-11-05 PROCEDURE — 99214 OFFICE O/P EST MOD 30 MIN: CPT | Performed by: PSYCHIATRY & NEUROLOGY

## 2024-11-05 ASSESSMENT — SLIT LAMP EXAM - LIDS
COMMENTS: NORMAL
COMMENTS: NORMAL

## 2024-11-05 ASSESSMENT — ENCOUNTER SYMPTOMS
CONSTITUTIONAL NEGATIVE: 0
ALLERGIC/IMMUNOLOGIC NEGATIVE: 0
GASTROINTESTINAL NEGATIVE: 0
NEUROLOGICAL NEGATIVE: 1
EYES NEGATIVE: 1
ENDOCRINE NEGATIVE: 0
MUSCULOSKELETAL NEGATIVE: 0
RESPIRATORY NEGATIVE: 0
HEMATOLOGIC/LYMPHATIC NEGATIVE: 0
PSYCHIATRIC NEGATIVE: 0
CARDIOVASCULAR NEGATIVE: 0

## 2024-11-05 ASSESSMENT — REFRACTION_WEARINGRX
OS_CYLINDER: -3.00
OS_AXIS: 170
SPECS_TYPE: SVL
OS_SPHERE: -1.25
OD_AXIS: 015
OD_SPHERE: -1.50
OD_CYLINDER: -3.00

## 2024-11-05 ASSESSMENT — CONF VISUAL FIELD
OS_INFERIOR_TEMPORAL_RESTRICTION: 0
OD_SUPERIOR_NASAL_RESTRICTION: 1
OS_NORMAL: 1
OD_INFERIOR_NASAL_RESTRICTION: 1
OS_SUPERIOR_NASAL_RESTRICTION: 0
OS_INFERIOR_NASAL_RESTRICTION: 0
OS_SUPERIOR_TEMPORAL_RESTRICTION: 0

## 2024-11-05 ASSESSMENT — EXTERNAL EXAM - RIGHT EYE: OD_EXAM: NORMAL

## 2024-11-05 ASSESSMENT — TONOMETRY
IOP_METHOD: GOLDMANN APPLANATION
OD_IOP_MMHG: 18
OS_IOP_MMHG: 19

## 2024-11-05 ASSESSMENT — CUP TO DISC RATIO
OS_RATIO: 0.15
OD_RATIO: 0.15

## 2024-11-05 ASSESSMENT — VISUAL ACUITY
OS_SC: 20/400
METHOD: SNELLEN - LINEAR

## 2024-11-05 ASSESSMENT — EXTERNAL EXAM - LEFT EYE: OS_EXAM: NORMAL

## 2024-11-05 NOTE — PROGRESS NOTES
"Assessment and Plan    06/08/2021 +OKN response OD  09/30/2019 +OKN response OD    10/29/2024 CT head without contrast, which I personally reviewed, shows the right frontal ventriculoperitoneal shunt catheter.  10/23/2024 MRI brain & orbits with contrast, which I personally reviewed, shows the right frontal ventriculoperitoneal shunt catheter.  10/23/2024 CT head without contrast, which I personally reviewed, shows the right frontal ventriculoperitoneal shunt catheter.    09/24/2024 CT head without contrast, which I personally reviewed previously, shows interval placement of a right frontal approach ventriculoperitoneal shunt.  09/23/2024 CT head without contrast, which I personally reviewed previously, shows no lesion.  09/21/2024 MRI brain without contrast, which I personally reviewed previously, shows stable findings.  09/21/2024 CT head without contrast & CTA head & neck, which I personally reviewed previously, shows the right frontal encephalomalacia.  09/17/2024 MRI brain & orbits with contrast & MRV head, which I personally reviewed previously, show stable right frontal encephalomalacia.  09/02/2024 CT head without contrast, which I personally reviewed previously, shows right frontal encephalomalacia stable from prior.  08/28/2024 CTA head and neck & CT head without contrast, which I personally reviewed previously, show right frontal encephalomalacia stable from prior.  08/06/2024 CTA head & neck & CT head without contrast, which I personally reviewed previously, show right frontal encephalomalacia stable from prior.  07/09/2024 CT head without contrast, which I personally reviewed previously, shows stable right frontal encephalomalacia.  06/05/2024 MRI orbits with contrast, which I personally reviewed previously, shows right frontal encephalomalacia similar to prior imaging.  11/07/2023 MRI brain without contrast, by report from [facility], shows \"No acute intracranial abnormality including no evidence " "of an acute or recent brain parenchymal infarct. \"  11/07/2023 CTA head & neck & CT head without contrast, by report from [facility], show \"No acute abnormality of the cervical and intracranial vasculature.\" And \"No evidence of an acute intracranial process.  Focal RIGHT frontal lobe encephalomalacia deep to a sherley hole, new from 02/25/2020. \"  08/01/2023 MRV head, which I personally reviewed previously, shows no lesion.  07/29/2023 MRI brain & orbits with contrast, which I personally reviewed previously, shows right frontal encephalomalacia consistent with prior bolt.  Interval head imaging.  10/05/2022 CT head without contrast & CTA head & neck, by report from [place], show \" 1.   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  Overlying right-sided sherley hole.)  2.  Prominence of the ventricles and sulci for age.  \" and \"1. Similar appearing old areas of infarction involving the right and left frontal lobes extending to the convexities with underlying encephalomalacia at site of prior hemorrhage from 02/13/2022. Overlying right-sided sherley hole. Prominence of the ventricles and sulci for age. No evidence of acute infarction. No active hemorrhage. 2. No significant stenosis internal carotid arteries. 3. No significant stenosis of the intracranial vessels or evidence of aneurysm. 4. Aberrant right subclavian artery behind the esophagus with no dilation of the proximal esophagus.\"  09/25/2022 CT head without contrast, which I personally reviewed previously, shows no lesion.  04/20/2022 CT head without contrast, which I personally reviewed previously, shows right frontal encephalomalacia judged stable by Radiology.  Interval head imaging.  09/10/2021 MRI brain with contrast, which I personally reviewed previously, shows no lesion.  09/09/2021 CTA head & neck, " which I personally reviewed previously, shows no lesion.  09/09/2021 CT head without contrast, which I personally reviewed previously, shows no lesion.  08/11/2021 MRI brain & orbits with contrast & MRV head, which I personally reviewed previously, show left optic atrophy with Radiology noting “Punctate focus of enhancement seen along the left 7th-8th cranial nerve bundle at the level of the porus acusticus, indeterminate. Otherwise unremarkable MRI of the brain.”  Interval head imaging.  06/04/2021 MRI brain & orbits with contrast, which I personally reviewed previously, shows left optic atrophy.  Interval head imaging.  06/29/2017 MRI brain without contrast, which I personally reviewed previously, shows no lesion.  11/22/2007 CT head without contrast, which I personally reviewed previously, shows no lesion.    09/18/2024 lumbar puncture tube 1: RBC 1000, WBC 6, tube 4:  & WBC 5, protein 79 & glucose 154. IgG studies with high albumin and albumin index with high cerebrospinal fluid (CSF) IgG/albumin ratio. Oligoclonal bands negative. Cytology negative. Flow cytometry negative. Encephalopathy autoimmune evaluation negative. Meningitis pathogen panel, HSV PCR, VZV PCR, EBV PCR, toxoplasma PCR, West Nile IgG & IgM, VDRL, fungal smear negative.  08/31/2023 lumbar puncture opening pressure 52 cm water, tube 1: , WBC 0, tube 4: RBC 6 & WBC 0, protein 91 & glucose 71.  08/11/2021 lumbar puncture opening pressure 18 cm water, tube 1: RBC 0, WBC 16 (86% L, 13% M), tube 4: RBC 0 & WBC 16 (92% L, 8% M), protein 71 & glucose 61. Cytology negative. Flow cytometry negative. Oligoclonal bands 0. IgG studies with high CSF IgG & albumin.  08/05/2020 lumbar puncture opening pressure 20 cm water, protein 68, glucose 85. MBP wnl. Oligoclonal bands POSITIVE 4.  12/26/2019 lumbar puncture no opening pressure checked per patient) tube ?: RBC 39, WBC 6 (91% L, 9% M), tube ?: RBC 38, WBC 5, protein 89, glucose 79. (via  [place] from [place])  11/13/2019 lumbar puncture opening pressure 22 cm water, tube 1: RBC 9, WBC 4, tube 4: RBC 1 & WBC 5, protein 82 & glucose 61. Oligoclonal bands 0. IgG studies with non-specific abnormalities. HSV PCR negative.  09/20/2019 lumbar puncture opening pressure 20 cm water, RBC 1, WBC 7 (96% L, 4% M), protein 94 & glucose 78.  01/31/2015 lumbar puncture RBC 2, WBC 0, protein 104 & glucose 74.    08/18/2024 Electrodiagnostic testing.  ffERG: Normal (OU).  Responses of L-/M-cones and S-cones: No abnormality can be detected (OU).  Responses of On- and Off-bipolar cells in cone pathway: Normal (OU).  PhNR (RGC function):  OD: Amplitude reduces mildly and implicit time prolongs mildly.  OS: Amplitude reduces moderately and implicit time prolongs mildly.  mfERG: No abnormality can be found (OU).  PVEP:  OD: Normal.  OS: Amplitude reduces severely.  Hemifield PVEP:  OD: Left-field PVEP amplitude is significantly lower than right-field PVEP.  OS: PVEP of both sides show no detectable components.  07/27/2019 multifocal ERG with mildly reduced central responses.  Pattern VEP with OD borderline latency at 0.25 degrees and OS with severely prolonged latencies and decreased amplitudes.    Lab Results   Component Value Date/Time    SEDRATE 19 08/01/2023 1558    SEDRATE 33 (H) 08/07/2022 0322    SEDRATE 19 05/25/2022 1501    SEDRATE 3 06/05/2020 1110    SEDRATE 6 05/13/2020 1529    SEDRATE 3 03/28/2020 0629    SEDRATE 5 09/16/2019 0507    SEDRATE 8 08/19/2019 1314    CRP 0.90 08/07/2022 0322    CRP 0.35 05/25/2022 1501    CRP 0.34 09/23/2021 0618    CRP 0.71 09/21/2021 0648    CRP 0.14 07/16/2020 0944    CRP 0.10 06/05/2020 1110    CRP <0.10 05/13/2020 1529    CRP <0.10 03/28/2020 0629    CRP 3.64 (A) 11/21/2019 2157    CRP <0.10 08/19/2019 1314      Lab Results   Component Value Date/Time    ZZMCZHTL81 383 09/17/2024 0242    OFZJGTVO32 299 02/23/2023 0941    ROJABTHL76 709 02/09/2021 0451    WPPTQONL46 508  12/10/2019 1349    FOLATE >24.0 09/17/2024 0242    RCFOL 951 12/10/2019 1349    DYLF0VV 142 09/17/2024 0250    VTAI Normal 09/17/2024 0242    VITAMINA 0.53 09/17/2024 0242    VTAR 0.02 09/17/2024 0242      Lab Results   Component Value Date/Time    NMOAQP4 Negative 09/17/2024 0242    NMOAQP4 Negative 11/14/2019 1447    MOGFACS Negative 09/17/2024 0242    MOGFACS Negative 11/10/2020 1000    MOGFACS Negative 11/14/2019 1447    ACE 41 09/17/2024 0242      Tuberculosis studies  Lab Results   Component Value Date/Time    TBSIN Negative 09/17/2024 0250    TBSIN Negative 09/22/2021 0430    TBSIN Negative 11/10/2020 1000    TBSIN Negative 11/14/2019 0531      Lab Results   Component Value Date/Time    BPEPFYBU80 383 09/17/2024 0242    UHEDJYRD27 299 02/23/2023 0941    VDIIBVDB55 709 02/09/2021 0451    NKBTNIYG56 508 12/10/2019 1349    FOLATE >24.0 09/17/2024 0242    RCFOL 951 12/10/2019 1349    UGUI2YZ 142 09/17/2024 0250    VTAI Normal 09/17/2024 0242    VITAMINA 0.53 09/17/2024 0242    VTAR 0.02 09/17/2024 0242      09/17/2024 syphilis REACTIVE. Treponema confirm & RPR non-reactive (NR). T-SPOT TB negative. Bartonella abs, Lyme PCR, RMSF abs, Toxoplasma IgG & IgM negative.  12/04/2023 ESR 10. CRP <0.3 mg/dL.  10/01/2023 syphilis non-reactive (NR).  09/18/2020 ESR 1. CRP < 0.08 mg/dL (0.8 mg/L).  08/28/2020 HbA1c HIGH 7.0. Lipid panel with LDL 64.  08/05/2020 B12 462. Folate wnl. AQP4 ab negative.  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 Suarez.  07/09/2019 BRETT > 1:640. Anti-centromere HIGH 101 (0-40). scl-70 negative. Chromatin ab IgG, anti-ribosomal ab, anti-Sarahy ab, anti-DNA ab negative.    11/05/2024 OCT RNFL OD 81 with T & I thinning & OS 37. (Lower OD & stable OS)  10/23/2024 OCT RNFL  & OS 41. (Lower OD & stable OS)  10/03/2024 OCT RNFL  & OS 39. (Lower OD & stable OS)  09/16/2024 OCT RNFL  & OS 36. (Interval new elevation OD & stable thinning OS)  07/25/2024 OCT RNFL OD  89 & OS 37. (Decrease, now at baseline of early 2024)  06/26/2024 OCT RNFL OD 95 & OS 41. (Stable)  06/05/2024 OCT RNFL OD 95 & OS 40. (Stable)  OCT macula OD normal foveal contour 264 & OS normal foveal contour 234.  03/15/2024 OCT RNFL OD 92 & OS 36. (Stable)  01/09/2024 OCT RNFL OD 89 & OS 36. (Stable)  02/06/2023 OCT RNFL OD 92 & OS 38. (stable)  OCT macula OD normal foveal contour 255 & OS thinning RNFL miscentered wrt fovea.  10/07/2022 OCT RNFL OD 92 & OS 40. (decreased OD & stable OS)  09/23/2022 OCT RNFL OD 98 & OS 38. (increased OD & stable OS)  01/27/2022 OCT RNFL OD 88 & OS 37. (stable)  10/06/2021 OCT RNFL OD 93 & OS 39 (stable)..  08/19/2021 OCT RNFL OD 92 & OS 38. (stable)  06/08/2021 OCT RNFL OD 87 & OS 37. (stable)  01/28/2021 OCT RNFL OD 85 & OS 37. (stable)  11/06/2020 OCT RNFL OD 89 & OS 39. (stable)  07/27/2020 OCT RNFL OD 89 & OS 38. (stable)  01/07/2020 OCT RNFL OD 93 & OS 38. (stable to mild increase OD, stable to mild decrease OS)  11/27/2019 OCT RNFL OD 84 & OS 42. (stable to mild OD decrease)  09/30/2019 OCT RNFL OD 89 & OS 41. (stable)  07/18/2019 OCT RNFL OD 90 & OS 39.  OCT macula OD normal foveal contour 256 & OS thinning of RNFL & GCL, but preserved foveal contour 238.    11/05/2024 HVF 24-2 OD stimulus V, fovea <0, inferior altitudinal & superior arcuate (some superior temporal sparing) & OS stimulus V, fovea 22, central, superior nasal step & inferior arcuate defects.  10/03/2024 HVF 24-2 OD fovea 4, generalized depression MD -29.75 & OS stimulus V, fovea 12, central, superior nasal step & inferior arcuate defects.  09/16/2024 HVF 24-2 OD stimulus III, fovea 14, generalized depression MD -31.43 & OS stimulus V, fovea 1, generalized depression with some superior temporal sparing.  07/25/2024 HVF 24-2 OD stimulus V, fovea 39, superior > inferior nasal step & OS stimulus V, fovea 18, generalized depression with some superior temporal sparing (inferior > superior  altitudinal.  06/26/2024 HVF 24-2 OD fovea 33, FL 3/15, FP 0%, FN 20%, superior arcuate MD -13.69 & OS stimulus V, fovea 27, generalized depression.  06/05/2024 HVF 24-2 OD fovea 34, FL 2/16, FP 0%, FN 40%, superior altitudinal MD -18.00 & OS stimulus V, fovea 25, generalized depression.  03/15/2024 HVF 24-2 OD fovea 36, wnl MD -1.05 & OS stimulus V, fovea 3, inferior arcuate > superior arcuate.  01/09/2024 HVF 24-2 OD wnl MD -0.94 & OS generalized depression MD -32.10.  02/06/2023 HVF 24-2 OD fovea 36, FL 1/17, FP 0%< FN 15%, scatter MD -7.58 & OS stimulus V, fovea 19, generalized depression.  10/07/2022 HVF 24-2 OD fovea 35, wnl MD -1.26 & OS fovea 5, generalized depression MD -30.62.  09/23/2022 HVF 24-2 OD fovea 38, scatter MD -2.67 & OS stimulus V, fovea 16, superior arcuate & inferior arcuate.  01/27/2022 HVF 24-2 OD fovea 36, wnl MD -1.67 & OS stimulus V, fovea 28, generalized reduction.  10/06/2021 HVF 24-2 OD fovea 36, scatter MD -4.76 & OS stimulus V, generalized reduction.  08/19/2021 HVF 24-2 OD fovea 39, wnl MD -2.31 & OS stimulus V, fovea 28, generalized depression.  06/08/2021 HVF Stimulus V OD fovea 34, wnl & OS stimulus V, fovea 24, generalized depression.  01/28/2021 HVF 24-2 OD fovea 40, wnl MD -0.63 & OS stimulus V, fovea 28, superior nasal step & inferior arcuate.  11/06/2020 HVF 24-2 OD fovea 32, possible inferior > superior arcuate MD -14.71 & OS stimulus V, fovea 10, generalized depression.  07/27/2020 HVF 24-2 OD fovea 39, wnl MD -2.45 & OS stimulus V, fovea 27, superior & inferior altitudinal.    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    She has some improvement in vision since the last visit with me although not as good as a the end of the hospital stay. The optic disc edema has resolved, and the neve is starting to show atrophy, as expected after the prior edema. The response to ventriculoperitoneal shunt makes intracranial pressure (ICP) elevation a part of this vision loss although I still do think there was an ischemic component with non-arteritic anterior ischemic optic neuropathy. There also is some variability in her examination and a history of functional vision loss although clearly with the optic disc edema and optic atrophy, functional vision loss is NOT the main problem.    The incision site appears clean and intact now, but I recommended she contact the neurosurgery office.      Plan    Contact Dr. Tafoya / Neurosurgery regarding ventriculoperitoneal shunt incision.  Continue furosemide.  Vasculopathic risk factor control.    Follow up in 1 month with Sharpe visual field (HVF) & OCT. (dilated 6/5/2024)

## 2024-11-06 ENCOUNTER — TELEPHONE (OUTPATIENT)
Dept: NEUROSURGERY | Facility: HOSPITAL | Age: 49
End: 2024-11-06
Payer: MEDICAID

## 2024-11-06 ENCOUNTER — APPOINTMENT (OUTPATIENT)
Dept: RADIOLOGY | Facility: HOSPITAL | Age: 49
End: 2024-11-06
Payer: MEDICAID

## 2024-11-06 ENCOUNTER — HOSPITAL ENCOUNTER (INPATIENT)
Facility: HOSPITAL | Age: 49
LOS: 1 days | Discharge: HOME | End: 2024-11-08
Attending: STUDENT IN AN ORGANIZED HEALTH CARE EDUCATION/TRAINING PROGRAM | Admitting: NEUROLOGICAL SURGERY
Payer: MEDICAID

## 2024-11-06 DIAGNOSIS — T81.30XA WOUND DEHISCENCE: Primary | ICD-10-CM

## 2024-11-06 LAB
ALBUMIN SERPL BCP-MCNC: 4 G/DL (ref 3.4–5)
ALP SERPL-CCNC: 107 U/L (ref 33–110)
ALT SERPL W P-5'-P-CCNC: 36 U/L (ref 7–45)
ANION GAP SERPL CALC-SCNC: 12 MMOL/L (ref 10–20)
APTT PPP: 35 SECONDS (ref 27–38)
AST SERPL W P-5'-P-CCNC: 19 U/L (ref 9–39)
B-HCG SERPL-ACNC: <3 MIU/ML
BASOPHILS # BLD AUTO: 0.03 X10*3/UL (ref 0–0.1)
BASOPHILS NFR BLD AUTO: 0.7 %
BILIRUB SERPL-MCNC: 0.2 MG/DL (ref 0–1.2)
BUN SERPL-MCNC: 16 MG/DL (ref 6–23)
CALCIUM SERPL-MCNC: 9.2 MG/DL (ref 8.6–10.6)
CHLORIDE SERPL-SCNC: 99 MMOL/L (ref 98–107)
CO2 SERPL-SCNC: 30 MMOL/L (ref 21–32)
CREAT SERPL-MCNC: 0.74 MG/DL (ref 0.5–1.05)
CRP SERPL-MCNC: 1.36 MG/DL
EGFRCR SERPLBLD CKD-EPI 2021: >90 ML/MIN/1.73M*2
EOSINOPHIL # BLD AUTO: 0.26 X10*3/UL (ref 0–0.7)
EOSINOPHIL NFR BLD AUTO: 5.8 %
ERYTHROCYTE [DISTWIDTH] IN BLOOD BY AUTOMATED COUNT: 15.2 % (ref 11.5–14.5)
ERYTHROCYTE [SEDIMENTATION RATE] IN BLOOD BY WESTERGREN METHOD: 36 MM/H (ref 0–20)
GLUCOSE SERPL-MCNC: 244 MG/DL (ref 74–99)
HCT VFR BLD AUTO: 34.7 % (ref 36–46)
HGB BLD-MCNC: 11.5 G/DL (ref 12–16)
IMM GRANULOCYTES # BLD AUTO: 0.02 X10*3/UL (ref 0–0.7)
IMM GRANULOCYTES NFR BLD AUTO: 0.4 % (ref 0–0.9)
INR PPP: 1 (ref 0.9–1.1)
LYMPHOCYTES # BLD AUTO: 1.75 X10*3/UL (ref 1.2–4.8)
LYMPHOCYTES NFR BLD AUTO: 38.9 %
MAGNESIUM SERPL-MCNC: 1.97 MG/DL (ref 1.6–2.4)
MCH RBC QN AUTO: 28.2 PG (ref 26–34)
MCHC RBC AUTO-ENTMCNC: 33.1 G/DL (ref 32–36)
MCV RBC AUTO: 85 FL (ref 80–100)
MONOCYTES # BLD AUTO: 0.35 X10*3/UL (ref 0.1–1)
MONOCYTES NFR BLD AUTO: 7.8 %
NEUTROPHILS # BLD AUTO: 2.09 X10*3/UL (ref 1.2–7.7)
NEUTROPHILS NFR BLD AUTO: 46.4 %
NRBC BLD-RTO: 0 /100 WBCS (ref 0–0)
PLATELET # BLD AUTO: 305 X10*3/UL (ref 150–450)
POTASSIUM SERPL-SCNC: 4 MMOL/L (ref 3.5–5.3)
PROT SERPL-MCNC: 7.1 G/DL (ref 6.4–8.2)
PROTHROMBIN TIME: 10.7 SECONDS (ref 9.8–12.8)
RBC # BLD AUTO: 4.08 X10*6/UL (ref 4–5.2)
SODIUM SERPL-SCNC: 137 MMOL/L (ref 136–145)
WBC # BLD AUTO: 4.5 X10*3/UL (ref 4.4–11.3)

## 2024-11-06 PROCEDURE — 2500000005 HC RX 250 GENERAL PHARMACY W/O HCPCS: Mod: SE

## 2024-11-06 PROCEDURE — 85025 COMPLETE CBC W/AUTO DIFF WBC: CPT

## 2024-11-06 PROCEDURE — 86140 C-REACTIVE PROTEIN: CPT

## 2024-11-06 PROCEDURE — 84702 CHORIONIC GONADOTROPIN TEST: CPT

## 2024-11-06 PROCEDURE — 70450 CT HEAD/BRAIN W/O DYE: CPT

## 2024-11-06 PROCEDURE — 85610 PROTHROMBIN TIME: CPT

## 2024-11-06 PROCEDURE — 99285 EMERGENCY DEPT VISIT HI MDM: CPT | Performed by: STUDENT IN AN ORGANIZED HEALTH CARE EDUCATION/TRAINING PROGRAM

## 2024-11-06 PROCEDURE — 85652 RBC SED RATE AUTOMATED: CPT

## 2024-11-06 PROCEDURE — 83735 ASSAY OF MAGNESIUM: CPT

## 2024-11-06 PROCEDURE — 80053 COMPREHEN METABOLIC PANEL: CPT

## 2024-11-06 PROCEDURE — 86901 BLOOD TYPING SEROLOGIC RH(D): CPT

## 2024-11-06 PROCEDURE — 99285 EMERGENCY DEPT VISIT HI MDM: CPT | Mod: 25

## 2024-11-06 RX ORDER — ONDANSETRON 4 MG/1
4 TABLET, ORALLY DISINTEGRATING ORAL ONCE
Status: COMPLETED | OUTPATIENT
Start: 2024-11-06 | End: 2024-11-06

## 2024-11-06 RX ADMIN — ONDANSETRON 4 MG: 4 TABLET, ORALLY DISINTEGRATING ORAL at 22:10

## 2024-11-06 ASSESSMENT — PAIN SCALES - GENERAL: PAINLEVEL_OUTOF10: 3

## 2024-11-06 ASSESSMENT — PAIN - FUNCTIONAL ASSESSMENT: PAIN_FUNCTIONAL_ASSESSMENT: 0-10

## 2024-11-06 NOTE — ED TRIAGE NOTES
Sent by neuro surg for wound check to incision on head. Shunt revision last week. Per pt she is leaking from site. Currently no drainage and appears well approximated.

## 2024-11-06 NOTE — TELEPHONE ENCOUNTER
Pt called and said she has drainage from her incision. I asked her to come to the ER at [facility].

## 2024-11-07 ENCOUNTER — APPOINTMENT (OUTPATIENT)
Dept: RADIOLOGY | Facility: HOSPITAL | Age: 49
End: 2024-11-07
Payer: MEDICAID

## 2024-11-07 ENCOUNTER — HOSPITAL ENCOUNTER (OUTPATIENT)
Dept: CARDIOLOGY | Facility: HOSPITAL | Age: 49
Discharge: HOME | End: 2024-11-07
Payer: MEDICAID

## 2024-11-07 PROBLEM — T81.30XA WOUND DEHISCENCE: Status: ACTIVE | Noted: 2024-11-07

## 2024-11-07 LAB
ABO GROUP (TYPE) IN BLOOD: NORMAL
ANTIBODY SCREEN: NORMAL
APPEARANCE CSF: CLEAR
BASOPHILS NFR CSF MANUAL: 0 %
BLASTS CSF MANUAL: 0 %
COLOR CSF: COLORLESS
COLOR SPUN CSF: COLORLESS
EOSINOPHIL NFR CSF MANUAL: 6 %
GLUCOSE BLD MANUAL STRIP-MCNC: 169 MG/DL (ref 74–99)
GLUCOSE BLD MANUAL STRIP-MCNC: 172 MG/DL (ref 74–99)
GLUCOSE BLD MANUAL STRIP-MCNC: 185 MG/DL (ref 74–99)
GLUCOSE BLD MANUAL STRIP-MCNC: 259 MG/DL (ref 74–99)
GLUCOSE BLD MANUAL STRIP-MCNC: 264 MG/DL (ref 74–99)
GLUCOSE CSF-MCNC: 107 MG/DL (ref 40–70)
IMM GRANULOCYTES NFR CSF: 0 %
LYMPHOCYTES NFR CSF MANUAL: 69 % (ref 28–96)
MONOS+MACROS NFR CSF MANUAL: 18 % (ref 16–56)
NEUTS SEG NFR CSF MANUAL: 8 % (ref 0–5)
OTHER CELLS NFR CSF MANUAL: 0 %
PLASMA CELLS NFR CSF MICRO: 0 %
PROT CSF-MCNC: 67 MG/DL (ref 15–45)
RBC # CSF AUTO: 138 /UL (ref 0–5)
RH FACTOR (ANTIGEN D): NORMAL
TOTAL CELLS COUNTED CSF: 80
TUBE # CSF: ABNORMAL
WBC # CSF AUTO: 6 /UL (ref 1–5)

## 2024-11-07 PROCEDURE — 70250 X-RAY EXAM OF SKULL: CPT

## 2024-11-07 PROCEDURE — 74018 RADEX ABDOMEN 1 VIEW: CPT | Mod: FOREIGN READ | Performed by: RADIOLOGY

## 2024-11-07 PROCEDURE — 71045 X-RAY EXAM CHEST 1 VIEW: CPT | Mod: FOREIGN READ | Performed by: RADIOLOGY

## 2024-11-07 PROCEDURE — 82945 GLUCOSE OTHER FLUID: CPT | Performed by: STUDENT IN AN ORGANIZED HEALTH CARE EDUCATION/TRAINING PROGRAM

## 2024-11-07 PROCEDURE — 84157 ASSAY OF PROTEIN OTHER: CPT | Performed by: STUDENT IN AN ORGANIZED HEALTH CARE EDUCATION/TRAINING PROGRAM

## 2024-11-07 PROCEDURE — 2500000004 HC RX 250 GENERAL PHARMACY W/ HCPCS (ALT 636 FOR OP/ED): Performed by: STUDENT IN AN ORGANIZED HEALTH CARE EDUCATION/TRAINING PROGRAM

## 2024-11-07 PROCEDURE — 2500000004 HC RX 250 GENERAL PHARMACY W/ HCPCS (ALT 636 FOR OP/ED): Performed by: PHYSICIAN ASSISTANT

## 2024-11-07 PROCEDURE — 81001 URINALYSIS AUTO W/SCOPE: CPT | Performed by: STUDENT IN AN ORGANIZED HEALTH CARE EDUCATION/TRAINING PROGRAM

## 2024-11-07 PROCEDURE — 2500000001 HC RX 250 WO HCPCS SELF ADMINISTERED DRUGS (ALT 637 FOR MEDICARE OP): Performed by: STUDENT IN AN ORGANIZED HEALTH CARE EDUCATION/TRAINING PROGRAM

## 2024-11-07 PROCEDURE — G0378 HOSPITAL OBSERVATION PER HR: HCPCS

## 2024-11-07 PROCEDURE — 87070 CULTURE OTHR SPECIMN AEROBIC: CPT | Performed by: STUDENT IN AN ORGANIZED HEALTH CARE EDUCATION/TRAINING PROGRAM

## 2024-11-07 PROCEDURE — 93005 ELECTROCARDIOGRAM TRACING: CPT

## 2024-11-07 PROCEDURE — 2500000002 HC RX 250 W HCPCS SELF ADMINISTERED DRUGS (ALT 637 FOR MEDICARE OP, ALT 636 FOR OP/ED): Performed by: STUDENT IN AN ORGANIZED HEALTH CARE EDUCATION/TRAINING PROGRAM

## 2024-11-07 PROCEDURE — 2500000005 HC RX 250 GENERAL PHARMACY W/O HCPCS: Performed by: PHYSICIAN ASSISTANT

## 2024-11-07 PROCEDURE — 89050 BODY FLUID CELL COUNT: CPT | Performed by: STUDENT IN AN ORGANIZED HEALTH CARE EDUCATION/TRAINING PROGRAM

## 2024-11-07 PROCEDURE — 1100000001 HC PRIVATE ROOM DAILY

## 2024-11-07 PROCEDURE — 2500000001 HC RX 250 WO HCPCS SELF ADMINISTERED DRUGS (ALT 637 FOR MEDICARE OP): Performed by: PHYSICIAN ASSISTANT

## 2024-11-07 PROCEDURE — 71045 X-RAY EXAM CHEST 1 VIEW: CPT

## 2024-11-07 PROCEDURE — 82947 ASSAY GLUCOSE BLOOD QUANT: CPT

## 2024-11-07 PROCEDURE — 70450 CT HEAD/BRAIN W/O DYE: CPT | Performed by: RADIOLOGY

## 2024-11-07 PROCEDURE — 70250 X-RAY EXAM OF SKULL: CPT | Mod: FOREIGN READ | Performed by: RADIOLOGY

## 2024-11-07 RX ORDER — OXYCODONE HYDROCHLORIDE 5 MG/1
5 TABLET ORAL EVERY 4 HOURS PRN
Status: DISCONTINUED | OUTPATIENT
Start: 2024-11-07 | End: 2024-11-08 | Stop reason: HOSPADM

## 2024-11-07 RX ORDER — DEXTROSE 50 % IN WATER (D50W) INTRAVENOUS SYRINGE
12.5
Status: DISCONTINUED | OUTPATIENT
Start: 2024-11-07 | End: 2024-11-08 | Stop reason: HOSPADM

## 2024-11-07 RX ORDER — CARBOXYMETHYLCELLULOSE SODIUM 10 MG/ML
2 GEL OPHTHALMIC 3 TIMES DAILY PRN
Status: DISCONTINUED | OUTPATIENT
Start: 2024-11-07 | End: 2024-11-08 | Stop reason: HOSPADM

## 2024-11-07 RX ORDER — AMITRIPTYLINE HYDROCHLORIDE 100 MG/1
100 TABLET ORAL NIGHTLY
Status: DISCONTINUED | OUTPATIENT
Start: 2024-11-07 | End: 2024-11-08 | Stop reason: HOSPADM

## 2024-11-07 RX ORDER — FUROSEMIDE 20 MG/1
20 TABLET ORAL 2 TIMES DAILY
Status: DISCONTINUED | OUTPATIENT
Start: 2024-11-07 | End: 2024-11-08 | Stop reason: HOSPADM

## 2024-11-07 RX ORDER — EZETIMIBE 10 MG/1
10 TABLET ORAL DAILY
Status: DISCONTINUED | OUTPATIENT
Start: 2024-11-07 | End: 2024-11-08 | Stop reason: HOSPADM

## 2024-11-07 RX ORDER — LACOSAMIDE 50 MG/1
50 TABLET ORAL EVERY 12 HOURS
Status: DISCONTINUED | OUTPATIENT
Start: 2024-11-07 | End: 2024-11-08 | Stop reason: HOSPADM

## 2024-11-07 RX ORDER — ROPINIROLE 0.5 MG/1
0.5 TABLET, FILM COATED ORAL
Status: DISCONTINUED | OUTPATIENT
Start: 2024-11-07 | End: 2024-11-08 | Stop reason: HOSPADM

## 2024-11-07 RX ORDER — AZELASTINE 1 MG/ML
1 SPRAY, METERED NASAL 2 TIMES DAILY
Status: DISCONTINUED | OUTPATIENT
Start: 2024-11-07 | End: 2024-11-08 | Stop reason: HOSPADM

## 2024-11-07 RX ORDER — DIVALPROEX SODIUM 500 MG/1
500 TABLET, FILM COATED, EXTENDED RELEASE ORAL 2 TIMES DAILY
Status: DISCONTINUED | OUTPATIENT
Start: 2024-11-07 | End: 2024-11-08 | Stop reason: HOSPADM

## 2024-11-07 RX ORDER — AMOXICILLIN 250 MG
2 CAPSULE ORAL 2 TIMES DAILY
Status: DISCONTINUED | OUTPATIENT
Start: 2024-11-07 | End: 2024-11-08 | Stop reason: HOSPADM

## 2024-11-07 RX ORDER — FLUTICASONE PROPIONATE 50 MCG
1 SPRAY, SUSPENSION (ML) NASAL DAILY
Status: DISCONTINUED | OUTPATIENT
Start: 2024-11-07 | End: 2024-11-08 | Stop reason: HOSPADM

## 2024-11-07 RX ORDER — CHOLECALCIFEROL (VITAMIN D3) 25 MCG
5000 TABLET ORAL DAILY
Status: DISCONTINUED | OUTPATIENT
Start: 2024-11-07 | End: 2024-11-08 | Stop reason: HOSPADM

## 2024-11-07 RX ORDER — SODIUM CHLORIDE 9 MG/ML
75 INJECTION, SOLUTION INTRAVENOUS CONTINUOUS
Status: DISCONTINUED | OUTPATIENT
Start: 2024-11-07 | End: 2024-11-07

## 2024-11-07 RX ORDER — INSULIN LISPRO 100 [IU]/ML
0-5 INJECTION, SOLUTION INTRAVENOUS; SUBCUTANEOUS
Status: DISCONTINUED | OUTPATIENT
Start: 2024-11-07 | End: 2024-11-08 | Stop reason: HOSPADM

## 2024-11-07 RX ORDER — DOXYLAMINE SUCCINATE 25 MG
1 TABLET ORAL DAILY PRN
COMMUNITY

## 2024-11-07 RX ORDER — FLUTICASONE PROPIONATE AND SALMETEROL XINAFOATE 230; 21 UG/1; UG/1
2 AEROSOL, METERED RESPIRATORY (INHALATION)
Status: DISCONTINUED | OUTPATIENT
Start: 2024-11-07 | End: 2024-11-08 | Stop reason: HOSPADM

## 2024-11-07 RX ORDER — MONTELUKAST SODIUM 10 MG/1
10 TABLET ORAL NIGHTLY
Status: DISCONTINUED | OUTPATIENT
Start: 2024-11-07 | End: 2024-11-08 | Stop reason: HOSPADM

## 2024-11-07 RX ORDER — INSULIN GLARGINE 100 [IU]/ML
25 INJECTION, SOLUTION SUBCUTANEOUS EVERY 24 HOURS
Status: DISCONTINUED | OUTPATIENT
Start: 2024-11-07 | End: 2024-11-08

## 2024-11-07 RX ORDER — ONDANSETRON 4 MG/1
4 TABLET, ORALLY DISINTEGRATING ORAL EVERY 8 HOURS PRN
Status: DISCONTINUED | OUTPATIENT
Start: 2024-11-07 | End: 2024-11-08 | Stop reason: HOSPADM

## 2024-11-07 RX ORDER — PANTOPRAZOLE SODIUM 40 MG/1
40 TABLET, DELAYED RELEASE ORAL
Status: DISCONTINUED | OUTPATIENT
Start: 2024-11-07 | End: 2024-11-08 | Stop reason: HOSPADM

## 2024-11-07 RX ORDER — HEPARIN SODIUM 5000 [USP'U]/ML
5000 INJECTION, SOLUTION INTRAVENOUS; SUBCUTANEOUS EVERY 8 HOURS
Status: DISCONTINUED | OUTPATIENT
Start: 2024-11-07 | End: 2024-11-08 | Stop reason: HOSPADM

## 2024-11-07 RX ORDER — DOXYLAMINE SUCCINATE 25 MG
TABLET ORAL DAILY
Status: DISCONTINUED | OUTPATIENT
Start: 2024-11-07 | End: 2024-11-08 | Stop reason: HOSPADM

## 2024-11-07 RX ORDER — NALOXONE HYDROCHLORIDE 0.4 MG/ML
0.2 INJECTION, SOLUTION INTRAMUSCULAR; INTRAVENOUS; SUBCUTANEOUS EVERY 5 MIN PRN
Status: DISCONTINUED | OUTPATIENT
Start: 2024-11-07 | End: 2024-11-08 | Stop reason: HOSPADM

## 2024-11-07 RX ORDER — CLONAZEPAM 0.5 MG/1
0.5 TABLET ORAL 2 TIMES DAILY
Status: DISCONTINUED | OUTPATIENT
Start: 2024-11-07 | End: 2024-11-08 | Stop reason: HOSPADM

## 2024-11-07 RX ORDER — LEVOTHYROXINE SODIUM 75 UG/1
75 TABLET ORAL
Status: DISCONTINUED | OUTPATIENT
Start: 2024-11-07 | End: 2024-11-08 | Stop reason: HOSPADM

## 2024-11-07 RX ORDER — POLYETHYLENE GLYCOL 3350 17 G/17G
17 POWDER, FOR SOLUTION ORAL DAILY
Status: DISCONTINUED | OUTPATIENT
Start: 2024-11-07 | End: 2024-11-08 | Stop reason: HOSPADM

## 2024-11-07 RX ORDER — LACOSAMIDE 100 MG/1
200 TABLET ORAL 2 TIMES DAILY
Status: DISCONTINUED | OUTPATIENT
Start: 2024-11-07 | End: 2024-11-08 | Stop reason: HOSPADM

## 2024-11-07 RX ORDER — PROPRANOLOL HYDROCHLORIDE 60 MG/1
60 CAPSULE, EXTENDED RELEASE ORAL DAILY
Status: DISCONTINUED | OUTPATIENT
Start: 2024-11-07 | End: 2024-11-08 | Stop reason: HOSPADM

## 2024-11-07 RX ORDER — ONDANSETRON HYDROCHLORIDE 2 MG/ML
4 INJECTION, SOLUTION INTRAVENOUS EVERY 8 HOURS PRN
Status: DISCONTINUED | OUTPATIENT
Start: 2024-11-07 | End: 2024-11-08 | Stop reason: HOSPADM

## 2024-11-07 RX ORDER — DEXTROSE 50 % IN WATER (D50W) INTRAVENOUS SYRINGE
25
Status: DISCONTINUED | OUTPATIENT
Start: 2024-11-07 | End: 2024-11-08 | Stop reason: HOSPADM

## 2024-11-07 RX ORDER — LEVETIRACETAM 250 MG/1
1500 TABLET ORAL 2 TIMES DAILY
Status: DISCONTINUED | OUTPATIENT
Start: 2024-11-07 | End: 2024-11-08 | Stop reason: HOSPADM

## 2024-11-07 RX ORDER — IPRATROPIUM BROMIDE AND ALBUTEROL SULFATE 2.5; .5 MG/3ML; MG/3ML
3 SOLUTION RESPIRATORY (INHALATION) EVERY 6 HOURS PRN
Status: DISCONTINUED | OUTPATIENT
Start: 2024-11-07 | End: 2024-11-08 | Stop reason: HOSPADM

## 2024-11-07 RX ORDER — FOLIC ACID 1 MG/1
1 TABLET ORAL DAILY
Status: DISCONTINUED | OUTPATIENT
Start: 2024-11-07 | End: 2024-11-08 | Stop reason: HOSPADM

## 2024-11-07 RX ORDER — ROPINIROLE 2 MG/1
2 TABLET, FILM COATED ORAL
Status: DISCONTINUED | OUTPATIENT
Start: 2024-11-07 | End: 2024-11-08 | Stop reason: HOSPADM

## 2024-11-07 RX ORDER — FLUTICASONE FUROATE AND VILANTEROL 200; 25 UG/1; UG/1
1 POWDER RESPIRATORY (INHALATION)
Status: DISCONTINUED | OUTPATIENT
Start: 2024-11-07 | End: 2024-11-07

## 2024-11-07 RX ADMIN — ROPINIROLE HYDROCHLORIDE 2 MG: 2 TABLET, FILM COATED ORAL at 17:13

## 2024-11-07 RX ADMIN — MICONAZOLE NITRATE: 20 CREAM TOPICAL at 10:43

## 2024-11-07 RX ADMIN — OXYCODONE HYDROCHLORIDE 5 MG: 5 TABLET ORAL at 13:14

## 2024-11-07 RX ADMIN — LACOSAMIDE 50 MG: 50 TABLET, FILM COATED ORAL at 09:14

## 2024-11-07 RX ADMIN — SODIUM CHLORIDE 75 ML/HR: 9 INJECTION, SOLUTION INTRAVENOUS at 09:27

## 2024-11-07 RX ADMIN — AMITRIPTYLINE HYDROCHLORIDE 100 MG: 100 TABLET ORAL at 20:23

## 2024-11-07 RX ADMIN — CLONAZEPAM 0.5 MG: 0.5 TABLET ORAL at 09:13

## 2024-11-07 RX ADMIN — FOLIC ACID 1 MG: 1 TABLET ORAL at 09:13

## 2024-11-07 RX ADMIN — AZELASTINE HYDROCHLORIDE 1 SPRAY: 137 SPRAY, METERED NASAL at 20:30

## 2024-11-07 RX ADMIN — LEVETIRACETAM 1500 MG: 250 TABLET, FILM COATED ORAL at 20:25

## 2024-11-07 RX ADMIN — FLUTICASONE PROPIONATE AND SALMETEROL XINAFOATE 2 PUFF: 230; 21 AEROSOL, METERED RESPIRATORY (INHALATION) at 12:29

## 2024-11-07 RX ADMIN — LACOSAMIDE 200 MG: 100 TABLET, FILM COATED ORAL at 09:13

## 2024-11-07 RX ADMIN — ROPINIROLE HYDROCHLORIDE 0.5 MG: 0.5 TABLET, FILM COATED ORAL at 10:42

## 2024-11-07 RX ADMIN — LEVETIRACETAM 1500 MG: 250 TABLET, FILM COATED ORAL at 09:13

## 2024-11-07 RX ADMIN — MONTELUKAST 10 MG: 10 TABLET, FILM COATED ORAL at 20:25

## 2024-11-07 RX ADMIN — INSULIN LISPRO 3 UNITS: 100 INJECTION, SOLUTION INTRAVENOUS; SUBCUTANEOUS at 16:43

## 2024-11-07 RX ADMIN — INSULIN LISPRO 1 UNITS: 100 INJECTION, SOLUTION INTRAVENOUS; SUBCUTANEOUS at 12:24

## 2024-11-07 RX ADMIN — PANTOPRAZOLE SODIUM 40 MG: 40 TABLET, DELAYED RELEASE ORAL at 17:13

## 2024-11-07 RX ADMIN — PRUCALOPRIDE 2 MG: 1 TABLET, FILM COATED ORAL at 12:29

## 2024-11-07 RX ADMIN — CLONAZEPAM 0.5 MG: 0.5 TABLET ORAL at 20:23

## 2024-11-07 RX ADMIN — HEPARIN SODIUM 5000 UNITS: 5000 INJECTION, SOLUTION INTRAVENOUS; SUBCUTANEOUS at 17:13

## 2024-11-07 RX ADMIN — OXYCODONE HYDROCHLORIDE 5 MG: 5 TABLET ORAL at 09:13

## 2024-11-07 RX ADMIN — FUROSEMIDE 20 MG: 20 TABLET ORAL at 20:24

## 2024-11-07 RX ADMIN — EZETIMIBE 10 MG: 10 TABLET ORAL at 10:42

## 2024-11-07 RX ADMIN — INSULIN LISPRO 1 UNITS: 100 INJECTION, SOLUTION INTRAVENOUS; SUBCUTANEOUS at 09:04

## 2024-11-07 RX ADMIN — FLUTICASONE PROPIONATE AND SALMETEROL XINAFOATE 2 PUFF: 230; 21 AEROSOL, METERED RESPIRATORY (INHALATION) at 20:31

## 2024-11-07 RX ADMIN — OXYCODONE HYDROCHLORIDE 5 MG: 5 TABLET ORAL at 21:47

## 2024-11-07 RX ADMIN — FUROSEMIDE 20 MG: 20 TABLET ORAL at 09:13

## 2024-11-07 RX ADMIN — LACOSAMIDE 50 MG: 50 TABLET, FILM COATED ORAL at 20:25

## 2024-11-07 RX ADMIN — OXYCODONE HYDROCHLORIDE 5 MG: 5 TABLET ORAL at 17:19

## 2024-11-07 RX ADMIN — AZELASTINE HYDROCHLORIDE 1 SPRAY: 137 SPRAY, METERED NASAL at 10:42

## 2024-11-07 RX ADMIN — LEVOTHYROXINE SODIUM 75 MCG: 75 TABLET ORAL at 10:43

## 2024-11-07 RX ADMIN — FLUTICASONE PROPIONATE 1 SPRAY: 50 SPRAY, METERED NASAL at 10:42

## 2024-11-07 RX ADMIN — ONDANSETRON 4 MG: 2 INJECTION INTRAMUSCULAR; INTRAVENOUS at 10:41

## 2024-11-07 RX ADMIN — LACOSAMIDE 200 MG: 100 TABLET, FILM COATED ORAL at 20:24

## 2024-11-07 RX ADMIN — PANTOPRAZOLE SODIUM 40 MG: 40 TABLET, DELAYED RELEASE ORAL at 09:26

## 2024-11-07 RX ADMIN — DIVALPROEX SODIUM 500 MG: 500 TABLET, FILM COATED, EXTENDED RELEASE ORAL at 20:24

## 2024-11-07 RX ADMIN — PROPRANOLOL HYDROCHLORIDE 60 MG: 60 CAPSULE, EXTENDED RELEASE ORAL at 10:41

## 2024-11-07 RX ADMIN — ONDANSETRON 4 MG: 4 TABLET, ORALLY DISINTEGRATING ORAL at 21:46

## 2024-11-07 RX ADMIN — Medication 5000 UNITS: at 09:13

## 2024-11-07 RX ADMIN — POLYETHYLENE GLYCOL 3350 17 G: 17 POWDER, FOR SOLUTION ORAL at 09:23

## 2024-11-07 RX ADMIN — DIVALPROEX SODIUM 500 MG: 500 TABLET, FILM COATED, EXTENDED RELEASE ORAL at 10:43

## 2024-11-07 ASSESSMENT — PAIN SCALES - GENERAL
PAINLEVEL_OUTOF10: 5 - MODERATE PAIN
PAINLEVEL_OUTOF10: 5 - MODERATE PAIN
PAINLEVEL_OUTOF10: 6
PAINLEVEL_OUTOF10: 5 - MODERATE PAIN
PAINLEVEL_OUTOF10: 4
PAINLEVEL_OUTOF10: 4
PAINLEVEL_OUTOF10: 8

## 2024-11-07 ASSESSMENT — COGNITIVE AND FUNCTIONAL STATUS - GENERAL
DAILY ACTIVITIY SCORE: 22
HELP NEEDED FOR BATHING: A LITTLE
DRESSING REGULAR LOWER BODY CLOTHING: A LITTLE
CLIMB 3 TO 5 STEPS WITH RAILING: A LITTLE
WALKING IN HOSPITAL ROOM: A LITTLE
MOBILITY SCORE: 22

## 2024-11-07 ASSESSMENT — PAIN - FUNCTIONAL ASSESSMENT
PAIN_FUNCTIONAL_ASSESSMENT: 0-10

## 2024-11-07 ASSESSMENT — PAIN DESCRIPTION - LOCATION: LOCATION: HEAD

## 2024-11-07 NOTE — HOSPITAL COURSE
Jonathan Ayala is a 48 y.o. 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 Recent workups include 9/17 presenting with 1 month R eye blurry vision, MRI brain RF encephalomalaxcia, MR orbit L optic nerve STIR signal, MRV negative, 9/18 s/p LP (OP 52), 9/21 s/p BAT for L hemiplegia, CTH/CTA stable RF encephalomalacia, no LVO, MRI  neg, 9/24/2024 s/p RF  shunt (certas at 5), 10/23 presenting after ophtho clinic with Dr. Mederos and found to 10 days of total right eye blindness, SS intact, 10/23 MRI brain/orbit neg, 10/25 s/p LP (OP28, CP9), 10/29 p/w HA and visual changes, 10/30 s/p R VPS exploration w abdominal catheter repositioning w improvement in distal runnoff. Now returned to [facility] ED 11/6 with mininmal clear incisional drainage and minimal decrease in vision. CTH stable, SS intact (Certas at 3), shunt tap with spontaneous flow. Ophthalmology evaluated patient with roughly stable exam. Patient admitted to neurosurgery service for further monitoring.     11/7 Incision oversewn at site of leaking.   11/8 No further leakage noted. Improved ophthalmology examination. CSF cultures remained negative.     On the day of discharge, the pt was tolerating a diet, pain was controlled on PO pain medication, and they were ambulating and voiding spontaneously. They were discharged in stable condition with detailed instructions and close scheduled outpatient follow up.

## 2024-11-07 NOTE — CONSULTS
Reason For Consult  Papilledema rule out    History Of Present Illness  Jonathan Ayala is a 48 y.o. female with PMH of left non-arteritic anterior ischemic optic neuropathy, bilateral sequential vision loss with right eye improvement 11/13/2019, idiopathic intracranial hypertension status post ventriculoperitoneal shunt , seizures, scleroderma, Sjogren, CVID on monthly IVIG, POTS, HTN, DM2, hypothyroidism, BRENT on CPAP, migraine  recently s/p  shunt revision on 10/30 presenting today for clear incisional drainage. She denies vision changes but does endorse headaches and incisional drainage when she coughs.      Past Medical History  She has a past medical history of Abnormal findings on diagnostic imaging of other abdominal regions, including retroperitoneum (10/14/2020), Acquired deformity of nose (03/24/2022), Acute upper respiratory infection, unspecified (10/16/2019), Adrenal disease (Multi), Allergic, Allergy status to unspecified drugs, medicaments and biological substances (05/22/2020), Allergy status to unspecified drugs, medicaments and biological substances (11/13/2020), Anemia, Anxiety (2005), Asthma, Benign intracranial hypertension (01/27/2022), Bipolar disorder, unspecified (Multi), Breast calcification, right (08/21/2018), Cellulitis of abdominal wall (09/28/2022), Cervicalgia (07/01/2020), Chronic maxillary sinusitis (01/04/2022), Chronic sialoadenitis (03/16/2020), COVID-19 (01/06/2022), Decreased white blood cell count, unspecified (11/04/2019), Disease of thyroid gland, Disturbances of salivary secretion (03/16/2020), Dry eye syndrome of bilateral lacrimal glands (10/07/2022), Encounter for preprocedural cardiovascular examination (02/01/2022), Food additives allergy status (06/11/2020), Fracture of nasal bones, initial encounter for closed fracture (03/03/2022), GERD (gastroesophageal reflux disease) (15 years old), Granuloma of right orbit (10/07/2021), History of endometrial ablation  (11/09/2017), Hyperlipidemia, Hypertension, Hyperthyroidism, Hypoglycemia, Hypothyroidism, Immunocompromised, Localized swelling, mass and lump, head (03/24/2022), Major depressive disorder, recurrent, in full remission ([facility]) (10/07/2021), 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), Meningitis ([facility]) (2008), Migraine, Nipple discharge (08/24/2022), Ocular pain, right eye (10/07/2022), Optic atrophy, Other abnormal and inconclusive findings on diagnostic imaging of breast (07/06/2020), Other anomalies of pupillary function (05/31/2019), Other chest pain (05/18/2020), Other conditions influencing health status (08/01/2022), Other conditions influencing health status (08/03/2021), Other specified disorders of eustachian tube, left ear (11/18/2019), Other specified disorders of nose and nasal sinuses (03/24/2022), Other specified disorders of nose and nasal sinuses (03/24/2022), Pelvic and perineal pain (07/06/2020), Personal history of other diseases of the circulatory system (04/07/2020), Personal history of other diseases of the circulatory system (04/07/2020), Personal history of other diseases of the circulatory system, Personal history of other diseases of the digestive system, Personal history of other diseases of the digestive system (03/02/2020), Personal history of other diseases of the musculoskeletal system and connective tissue (01/19/2022), Personal history of other diseases of the musculoskeletal system and connective tissue (03/02/2021), Personal history of other diseases of the musculoskeletal system and connective tissue (06/16/2020), Personal history of other diseases of the nervous system and sense organs (11/18/2019), Personal history of other diseases of the nervous system and sense organs (09/21/2022), Personal history of other diseases of the respiratory system (04/14/2021), Personal history of other endocrine, nutritional and metabolic disease (02/17/2021), Personal history of other  mental and behavioral disorders (05/27/2021), Personal history of other specified conditions (09/07/2022), Personal history of other specified conditions (10/16/2019), Personal history of other specified conditions (09/28/2022), Personal history of other specified conditions (09/16/2021), Personal history of other specified conditions (02/01/2022), Personal history of other specified conditions (03/09/2022), Personal history of other specified conditions (02/12/2014), Personal history of other specified conditions (10/27/2021), Personal history of other specified conditions (10/16/2019), Personal history of other specified conditions (02/26/2021), Personal history of other specified conditions (02/22/2021), Personal history of urinary calculi, Polycystic ovary syndrome, Postural orthostatic tachycardia syndrome (POTS), Rash and other nonspecific skin eruption (03/15/2022), Repeated falls (06/23/2021), Right lower quadrant pain (10/14/2020), Seizures (Multi), Sjogren syndrome, unspecified (Multi), Sjogren's syndrome, Sleep apnea, Slow transit constipation (07/09/2020), Subarachnoid hemorrhage, traumatic (Multi) (04/19/2023), Thyroid nodule, Traumatic subarachnoid hemorrhage with loss of consciousness of unspecified duration, subsequent encounter (03/15/2022), Traumatic subarachnoid hemorrhage without loss of consciousness, subsequent encounter, Type 2 diabetes mellitus, Unspecified disorder of refraction (10/07/2022), Unspecified optic neuritis (11/06/2020), Unspecified optic neuritis (11/06/2020), Unspecified visual loss (09/25/2019), Varicella (As a child), Venous insufficiency (chronic) (peripheral) (10/18/2021), Viral infection, unspecified (01/11/2022), Vitamin D deficiency, and Vitamin D deficiency, unspecified (09/28/2022).    Surgical History  She has a past surgical history that includes Other surgical history (08/22/2019); Other surgical history (08/22/2019); Other surgical history (08/22/2019); Other  surgical history (08/22/2019); Other surgical history (08/22/2019); Other surgical history (08/22/2019); MR angio neck wo IV contrast (02/08/2021); MR angio head wo IV contrast (02/08/2021); [place] tooth extraction (2004); Appendectomy (2017); Hysterectomy (2017); Hernia repair (Right, 03/01/2024); Cardiac catheterization; Cholecystectomy; Fracture surgery (12/2023); Endometrial ablation; and Brain surgery ([place] placement to measure pressure).    Social History  She reports that she has never smoked. She has never used smokeless tobacco. She reports that she does not currently use alcohol. She reports current drug use. Drug: Benzodiazepines.    Family History  Family History   Problem Relation Name Age of Onset    Other (Perforated bowel) Mother Radha     Hypertension Mother Radha     Macular degeneration Mother Radha     Depression Mother Radha     Hyperlipidemia Mother Radha     Mental illness Mother Radha     Hyperlipidemia Father Mac     Hypertension Father Mac     Heart attack Father Mac     Other (S/P CABG) Father Mac     Diabetes Father Mac     Prostate cancer Father Mac     Coronary artery disease Father Mac     Heart failure Father Mac     Stroke Father Mac     Cancer Father Mac     Macular degeneration Father Mac     Retinal detachment Father Mac     Asthma Father Mac     Hearing loss Father Mac     Heart disease Father Mac     Hernia Father Mac     Stroke Sister Jazmin     Breast cancer Sister Jazmin     Lupus Sister Jazmin     Ovarian cancer Sister Jazmin     Astigmatism Sister Jazmin     Arthritis Sister Jazmin     Asthma Sister Jazmin     Depression Sister Jazmin     Mental illness Sister Jazmin     Miscarriages / Stillbirths Sister Jazmin     Vision loss Sister Jazmin     Hyperlipidemia Brother Sanchez     Hypertension Brother Sanchez     Astigmatism Brother Sanchez     Arthritis Brother Sanchez     Asthma Brother Sanchez     Diabetes Father's Sister Linda     Blindness Father's Sister Linda      Vision loss Father's Sister Linda     Thyroid cancer Father's Sister Bekah     Thyroid disease Father's Sister Jessica     Colon cancer Father's Brother ?     Cancer Father's Brother Kian     Anesthesia related problems Father's Brother Kian     Depression Father's Brother Kian     Hernia Father's Brother Kian     Mental illness Father's Brother Kian     Cancer Maternal Grandmother Brenda     Ovarian cancer Maternal Grandmother Brenda     Blindness Other Multiple     Melanoma Other      Asthma Other      Other (hay fever) Other      Allergies Other      Alcohol abuse Paternal Grandfather Elkin     Arthritis Sister Isha     Asthma Sister Isha     Cancer Sister Isha     Depression Sister Isha     Mental illness Sister Isha     Miscarriages / Stillbirths Sister Isha     Ovarian cancer Sister Isha     Glaucoma Neg Hx          Allergies  Acetazolamide, Atorvastatin, Cefdinir, Ceftriaxone, Doxepin, Duloxetine, Fd and c red no.40, Levofloxacin, Levofloxacin in d5w, Nutritional supplements, Ozempic [semaglutide], Prochlorperazine, Red dye, Rosemary, Rosemary oil, Strawberry, Sulfa (sulfonamide antibiotics), Topiramate, Tree pollen-black walnut, Tree pollen-pecan, Spruce, Aripiprazole, Aspartame, Aspartame (bulk), Fenofibrate, Gluten, Hydrochlorothiazide, Hydromorphone, Iron, Meclizine, Metformin, Propoxyphene, Statins-hmg-coa reductase inhibitors, Tetracyclines, Thiazides, Venlafaxine, Wheat, Adhesive tape-silicones, Barbiturates, Ciprofloxacin, Dhe, Diet foods, Farxiga [dapagliflozin], Ferrous sulfate, Nsaids (non-steroidal anti-inflammatory drug), Other, Sulfonylureas, Tobramycin, Vancomycin, Adhesive, Azithromycin, Betamethasone, House dust, Metoclopramide hcl, Prednisone, Propoxyphene-acetaminophen, Sulfacetamide sodium, Koxyjual-1-pv3 antimigraine agents, and Zolpidem    Review of Systems  As above     Physical Exam  Base Eye Exam       Visual Acuity (Snellen - Linear)         Right Left    Near sc 20/200 ph  "20/40+2 superotemporally 20/200 ph 20/40-2              Tonometry (Tonopen, 1:13 AM)         Right Left    Pressure 17 18              Pupils         Dark Light Shape React APD    Right 6 5 Round Sluggish None    Left 6 5 Round Sluggish +APD              Visual Fields         Left Right     Full                                 Extraocular Movement         Right Left     Full Full              Neuro/Psych       Oriented x3: Yes    Mood/Affect: Normal                  Additional Tests       Color         Right Left    [place] 4/9 3/9                  Slit Lamp and Fundus Exam       External Exam         Right Left    External Normal Normal              Slit Lamp Exam         Right Left    Lids/Lashes Normal Normal    Conjunctiva/Sclera White and quiet White and quiet    Cornea Clear Clear    Anterior Chamber Deep and quiet Deep and quiet    Iris Round and reactive Round and reactive    Lens Clear Clear    Anterior Vitreous Normal Normal              Fundus Exam         Right Left    Disc pallor temporally Pallor    C/D Ratio 0.15 0.15    Macula Normal Normal    Vessels Normal Normal    Periphery Normal Normal                     Last Recorded Vitals  Blood pressure 170/89, pulse 97, temperature 36.7 °C (98.1 °F), resp. rate 16, height 1.575 m (5' 2\"), weight 113 kg (250 lb), SpO2 96%.    Relevant Results       Assessment/Plan     #Idiopathic intracranial hypertension  #NAION, both eyes   #S/p  shunt  - Jonathan Ayala is a 48 y.o. female with PMH of left non-arteritic anterior ischemic optic neuropathy, bilateral sequential vision loss with right eye improvement 11/13/2019, idiopathic intracranial hypertension status post ventriculoperitoneal shunt , seizures, scleroderma, Sjogren, CVID on monthly IVIG, POTS, HTN, DM2, hypothyroidism, BRENT on CPAP, migraine  recently s/p  shunt revision on 10/30 presenting today for clear incisional drainage, ophthalmology consulted to check visual acuity and rule out optic disc " edema.   - Today, visual acuity is improved from recent outpatient visit with Dr. Mederos, and approximately stable to last eye exam on discharge. There is no optic disc edema, only pallor as noted in outpatient visit.   - CTH with stable appearance of ventricles.   - Patient discussed with neurosurgery and planned to be admitted for wound washout and possible shunt explanation.   - Ophthalmology will continue to follow.       Herminia Marie MD  Ophthalmology, PGY3    Ophthalmology Adult Pager - [phone]  Ophthalmology Pediatrics Pager (M-F 8:00am-5:00pm) - [phone]    For adult follow-up appointments, call: [phone]  For pediatric follow-up appointments, call: [phone]

## 2024-11-07 NOTE — PROGRESS NOTES
I received this patient during signout at 2300.   Please see previous provider's note for detailed H&P, labs and imaging.      Under my care, patient reassessed and remains clinically stable. See ED course below.    @  ED Course as of 11/07/24 0324   Wed Nov 06, 2024   2231 I assumed care of patient at 2200, patient can see shapes, pictures that are not clear out of right eye, legally blind in left eye. Optho and NSGY following [SA]   Thu Nov 07, 2024   0012 CRP is elevated to 1.36 from prior, CMP overall within normal limits, CBC with normal WBC count, platelets, hemoglobin is 11.5 which is at baseline, ESR slightly elevated at 36 [SA]   0105 Optho evaluated patient, no appreciable papilledema [SA]   0105 CTH reviewed:  IMPRESSION:  CT HEAD:  1. No acute intracranial abnormality.  2. Stable positioning of right frontal approach ventricular shunt  catheter with the tip terminating at the foramen of [place]ro. Stable  appearance of the ventricles.   [SA]   0106 XR shunt series reviewed:  IMPRESSION:  Radiopaque portions of the shunt tubing show no kink or discontinuity.   [SA]   0323 Discussed with NSGY who will admit patient to their service for postop concern [SA]      ED Course User Index  [SA] Tiffany Alvarez DO   @    Disposition: Admitted        Patient seen and staffed with attending physician.     Tiffany Alvarez DO   EM PGY3

## 2024-11-07 NOTE — H&P
History Of Present Illness  Jonathan Ayala is a 48 y.o. female presenting with wound dehiscence.    History Of Present Illness  Jonathan Ayala is a 48 y.o. female presenting with shunt incision drainage.     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 Mederos and found to 10 days of total right eye blindness, SS intact, 10/23 MRI brain/orbit neg, 10/25 s/p LP (OP28, CP9), 10/29 p/w HA and visual changes, 10/30 s/p R VPS exploration w abdominal catheter repositioning w improvement in distal runnoff, 11/6 p/w min clear incisional drainage, min decr in vision     Patient states was in usual state of health when she states for 2d she has noticed some minimal clear incisional drainage. She states that her vision is still improved from prior to surgery but worse than at the end of her hospital stay. Patient is endorsing low pressure headaches for the last 1 day. No fevers, chills, n/v, numbness, weakness, paresthesias.     Past Medical History  She has a past medical history of Abnormal findings on diagnostic imaging of other abdominal regions, including retroperitoneum (10/14/2020), Acquired deformity of nose (03/24/2022), Acute upper respiratory infection, unspecified (10/16/2019), Adrenal disease (Multi), Allergic, Allergy status to unspecified drugs, medicaments and biological substances (05/22/2020), Allergy status to  unspecified drugs, medicaments and biological substances (11/13/2020), Anemia, Anxiety (2005), Asthma, Benign intracranial hypertension (01/27/2022), Bipolar disorder, unspecified (Multi), Breast calcification, right (08/21/2018), Cellulitis of abdominal wall (09/28/2022), Cervicalgia (07/01/2020), Chronic maxillary sinusitis (01/04/2022), Chronic sialoadenitis (03/16/2020), COVID-19 (01/06/2022), Decreased white blood cell count, unspecified (11/04/2019), Disease of thyroid gland, Disturbances of salivary secretion (03/16/2020), Dry eye syndrome of bilateral lacrimal glands (10/07/2022), Encounter for preprocedural cardiovascular examination (02/01/2022), Food additives allergy status (06/11/2020), Fracture of nasal bones, initial encounter for closed fracture (03/03/2022), GERD (gastroesophageal reflux disease) (15 years old), Granuloma of right orbit (10/07/2021), History of endometrial ablation (11/09/2017), Hyperlipidemia, Hypertension, Hyperthyroidism, Hypoglycemia, Hypothyroidism, Immunocompromised, Localized swelling, mass and lump, head (03/24/2022), Major depressive disorder, recurrent, in full remission (CMS-HCC) (10/07/2021), 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), Meningitis ([facility]) (2008), Migraine, Nipple discharge (08/24/2022), Ocular pain, right eye (10/07/2022), Optic atrophy, Other abnormal and inconclusive findings on diagnostic imaging of breast (07/06/2020), Other anomalies of pupillary function (05/31/2019), Other chest pain (05/18/2020), Other conditions influencing health status (08/01/2022), Other conditions influencing health status (08/03/2021), Other specified disorders of eustachian tube, left ear (11/18/2019), Other specified disorders of nose and nasal sinuses (03/24/2022), Other specified disorders of nose and nasal sinuses (03/24/2022), Pelvic and perineal pain (07/06/2020), Personal history of other diseases of the circulatory system (04/07/2020), Personal history of  other diseases of the circulatory system (04/07/2020), Personal history of other diseases of the circulatory system, Personal history of other diseases of the digestive system, Personal history of other diseases of the digestive system (03/02/2020), Personal history of other diseases of the musculoskeletal system and connective tissue (01/19/2022), Personal history of other diseases of the musculoskeletal system and connective tissue (03/02/2021), Personal history of other diseases of the musculoskeletal system and connective tissue (06/16/2020), Personal history of other diseases of the nervous system and sense organs (11/18/2019), Personal history of other diseases of the nervous system and sense organs (09/21/2022), Personal history of other diseases of the respiratory system (04/14/2021), Personal history of other endocrine, nutritional and metabolic disease (02/17/2021), Personal history of other mental and behavioral disorders (05/27/2021), Personal history of other specified conditions (09/07/2022), Personal history of other specified conditions (10/16/2019), Personal history of other specified conditions (09/28/2022), Personal history of other specified conditions (09/16/2021), Personal history of other specified conditions (02/01/2022), Personal history of other specified conditions (03/09/2022), Personal history of other specified conditions (02/12/2014), Personal history of other specified conditions (10/27/2021), Personal history of other specified conditions (10/16/2019), Personal history of other specified conditions (02/26/2021), Personal history of other specified conditions (02/22/2021), Personal history of urinary calculi, Polycystic ovary syndrome, Postural orthostatic tachycardia syndrome (POTS), Rash and other nonspecific skin eruption (03/15/2022), Repeated falls (06/23/2021), Right lower quadrant pain (10/14/2020), Seizures (Multi), Sjogren syndrome, unspecified (Multi), Sjogren's syndrome,  Sleep apnea, Slow transit constipation (07/09/2020), Subarachnoid hemorrhage, traumatic (Multi) (04/19/2023), Thyroid nodule, Traumatic subarachnoid hemorrhage with loss of consciousness of unspecified duration, subsequent encounter (03/15/2022), Traumatic subarachnoid hemorrhage without loss of consciousness, subsequent encounter, Type 2 diabetes mellitus, Unspecified disorder of refraction (10/07/2022), Unspecified optic neuritis (11/06/2020), Unspecified optic neuritis (11/06/2020), Unspecified visual loss (09/25/2019), Varicella (As a child), Venous insufficiency (chronic) (peripheral) (10/18/2021), Viral infection, unspecified (01/11/2022), Vitamin D deficiency, and Vitamin D deficiency, unspecified (09/28/2022).     Surgical History  She has a past surgical history that includes Other surgical history (08/22/2019); Other surgical history (08/22/2019); Other surgical history (08/22/2019); Other surgical history (08/22/2019); Other surgical history (08/22/2019); Other surgical history (08/22/2019); MR angio neck wo IV contrast (02/08/2021); MR angio head wo IV contrast (02/08/2021); [place] tooth extraction (2004); Appendectomy (2017); Hysterectomy (2017); Hernia repair (Right, 03/01/2024); Cardiac catheterization; Cholecystectomy; Fracture surgery (12/2023); Endometrial ablation; and Brain surgery ([place] placement to measure pressure).     Social History  She reports that she has never smoked. She has never used smokeless tobacco. She reports that she does not currently use alcohol. She reports current drug use. Drug: Benzodiazepines.     Family History  Family History          Family History   Problem Relation Name Age of Onset    Other (Perforated bowel) Mother Radha      Hypertension Mother Radha      Macular degeneration Mother Radha      Depression Mother Radha      Hyperlipidemia Mother Radha      Mental illness Mother Radha      Hyperlipidemia Father Mac      Hypertension Father Mac       Heart attack Father Mac      Other (S/P CABG) Father Mac      Diabetes Father Mac      Prostate cancer Father Mac      Coronary artery disease Father Mac      Heart failure Father Mac      Stroke Father Mac      Cancer Father Mac      Macular degeneration Father Mac      Retinal detachment Father Mac      Asthma Father Mac      Hearing loss Father Mac      Heart disease Father Mac      Hernia Father Mac      Stroke Sister Jazmin      Breast cancer Sister Jazmin      Lupus Sister Jazmin      Ovarian cancer Sister Jazmin      Astigmatism Sister Jazmin      Arthritis Sister Jazmin      Asthma Sister Jazmin      Depression Sister Jazmin      Mental illness Sister Jazmin      Miscarriages / Stillbirths Sister Jazmin      Vision loss Sister Jazmin      Hyperlipidemia Brother Sanchez      Hypertension Brother Sanchez      Astigmatism Brother Sanchez      Arthritis Brother Sanchez      Asthma Brother Sanchez      Diabetes Father's Sister Linda      Blindness Father's Sister Linda      Vision loss Father's Sister Linda      Thyroid cancer Father's Sister Bekah      Thyroid disease Father's Sister Jessica      Colon cancer Father's Brother ?      Cancer Father's Brother Kian      Anesthesia related problems Father's Brother Kian      Depression Father's Brother Kian      Hernia Father's Brother Kian      Mental illness Father's Brother Kian      Cancer Maternal Grandmother Brenda      Ovarian cancer Maternal Grandmother Brenda      Blindness Other Multiple      Melanoma Other        Asthma Other        Other (hay fever) Other        Allergies Other        Alcohol abuse Paternal Grandfather Elkin      Arthritis Sister Isha      Asthma Sister Isha      Cancer Sister Isha      Depression Sister Isha      Mental illness Sister Isha      Miscarriages / Stillbirths Sister Isha      Ovarian cancer Sister Isha      Glaucoma Neg Hx                Allergies  Acetazolamide, Atorvastatin, Cefdinir, Ceftriaxone, Doxepin, Duloxetine, Fd and c red  "no.40, Levofloxacin, Levofloxacin in d5w, Nutritional supplements, Ozempic [semaglutide], Prochlorperazine, Red dye, Rosemary, Rosemary oil, Strawberry, Sulfa (sulfonamide antibiotics), Topiramate, Tree pollen-black walnut, Tree pollen-pecan, [place], Aripiprazole, Aspartame, Aspartame (bulk), Fenofibrate, Gluten, Hydrochlorothiazide, Hydromorphone, Iron, Meclizine, Metformin, Propoxyphene, Statins-hmg-coa reductase inhibitors, Tetracyclines, Thiazides, Venlafaxine, Wheat, Adhesive tape-silicones, Barbiturates, Ciprofloxacin, Dhe, Diet foods, Farxiga [dapagliflozin], Ferrous sulfate, Nsaids (non-steroidal anti-inflammatory drug), Other, Sulfonylureas, Tobramycin, Vancomycin, Adhesive, Azithromycin, Betamethasone, House dust, Metoclopramide hcl, Prednisone, Propoxyphene-acetaminophen, Sulfacetamide sodium, Uzrahfgn-5-[id number] antimigraine agents, and Zolpidem     Review of Systems  Neg other than above     Physical Exam  NAD  Well perfused  Equal chest rise b/l  Incision healing w/ min clear drainage  CECI  Appropriate affect     Aox3  PERRL, EOMI, FS, TM  Ou 20/200  BUE/BLE prox 5 dist 5  SILT     Last Recorded Vitals  Blood pressure 170/89, pulse 97, temperature 36.7 °C (98.1 °F), resp. rate 16, height 1.575 m (5' 2\"), weight 113 kg (250 lb), SpO2 96%.     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 Mederos and found to 10 days of total right eye blindness, SS intact, 10/23 MRI brain/orbit neg, 10/25 s/p LP (OP28, CP9), 10/29 p/w HA and visual changes, 10/30 s/p R VPS exploration w abdominal catheter repositioning w improvement in distal runnoff, 11/6 p/w min clear incisional drainage, min decr in vision     Patient with minimal clear drainage from incisional site and slightly worsened vision. Will benefit from further labs and imaging, CTH stable vents, SS intact Certas at 3, ophtho roughly stable exam to hospital discharge no papilledema     Floor  CSF cx  OR for wound washout and poss shunt explant  Labs  Home meds  NPO after midnight  SCDs               Albert Crespo MD

## 2024-11-07 NOTE — CONSULTS
Reason For Consult  Shunt incision drainage    History Of Present Illness  Jonathan Ayala is a 48 y.o. female presenting with shunt incision drainage.    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 Mederos and found to 10 days of total right eye blindness, SS intact, 10/23 MRI brain/orbit neg, 10/25 s/p LP (OP28, CP9), 10/29 p/w HA and visual changes, 10/30 s/p R VPS exploration w abdominal catheter repositioning w improvement in distal runnoff, 11/6 p/w min clear incisional drainage, min decr in vision    Patient states was in usual state of health when she states for 2d she has noticed some minimal clear incisional drainage. She states that her vision is still improved from prior to surgery but worse than at the end of her hospital stay. Patient is endorsing low pressure headaches for the last 1 day. No fevers, chills, n/v, numbness, weakness, paresthesias.     Past Medical History  She has a past medical history of Abnormal findings on diagnostic imaging of other abdominal regions, including retroperitoneum (10/14/2020), Acquired deformity of nose (03/24/2022), Acute upper respiratory infection, unspecified (10/16/2019), Adrenal disease (Multi), Allergic, Allergy status to unspecified drugs, medicaments and biological substances (05/22/2020), Allergy status to unspecified drugs, medicaments and biological substances  (11/13/2020), Anemia, Anxiety (2005), Asthma, Benign intracranial hypertension (01/27/2022), Bipolar disorder, unspecified (Multi), Breast calcification, right (08/21/2018), Cellulitis of abdominal wall (09/28/2022), Cervicalgia (07/01/2020), Chronic maxillary sinusitis (01/04/2022), Chronic sialoadenitis (03/16/2020), COVID-19 (01/06/2022), Decreased white blood cell count, unspecified (11/04/2019), Disease of thyroid gland, Disturbances of salivary secretion (03/16/2020), Dry eye syndrome of bilateral lacrimal glands (10/07/2022), Encounter for preprocedural cardiovascular examination (02/01/2022), Food additives allergy status (06/11/2020), Fracture of nasal bones, initial encounter for closed fracture (03/03/2022), GERD (gastroesophageal reflux disease) (15 years old), Granuloma of right orbit (10/07/2021), History of endometrial ablation (11/09/2017), Hyperlipidemia, Hypertension, Hyperthyroidism, Hypoglycemia, Hypothyroidism, Immunocompromised, Localized swelling, mass and lump, head (03/24/2022), Major depressive disorder, recurrent, in full remission (CMS-HCC) (10/07/2021), 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), Meningitis ([facility]) (2008), Migraine, Nipple discharge (08/24/2022), Ocular pain, right eye (10/07/2022), Optic atrophy, Other abnormal and inconclusive findings on diagnostic imaging of breast (07/06/2020), Other anomalies of pupillary function (05/31/2019), Other chest pain (05/18/2020), Other conditions influencing health status (08/01/2022), Other conditions influencing health status (08/03/2021), Other specified disorders of eustachian tube, left ear (11/18/2019), Other specified disorders of nose and nasal sinuses (03/24/2022), Other specified disorders of nose and nasal sinuses (03/24/2022), Pelvic and perineal pain (07/06/2020), Personal history of other diseases of the circulatory system (04/07/2020), Personal history of other diseases of the circulatory system (04/07/2020),  Personal history of other diseases of the circulatory system, Personal history of other diseases of the digestive system, Personal history of other diseases of the digestive system (03/02/2020), Personal history of other diseases of the musculoskeletal system and connective tissue (01/19/2022), Personal history of other diseases of the musculoskeletal system and connective tissue (03/02/2021), Personal history of other diseases of the musculoskeletal system and connective tissue (06/16/2020), Personal history of other diseases of the nervous system and sense organs (11/18/2019), Personal history of other diseases of the nervous system and sense organs (09/21/2022), Personal history of other diseases of the respiratory system (04/14/2021), Personal history of other endocrine, nutritional and metabolic disease (02/17/2021), Personal history of other mental and behavioral disorders (05/27/2021), Personal history of other specified conditions (09/07/2022), Personal history of other specified conditions (10/16/2019), Personal history of other specified conditions (09/28/2022), Personal history of other specified conditions (09/16/2021), Personal history of other specified conditions (02/01/2022), Personal history of other specified conditions (03/09/2022), Personal history of other specified conditions (02/12/2014), Personal history of other specified conditions (10/27/2021), Personal history of other specified conditions (10/16/2019), Personal history of other specified conditions (02/26/2021), Personal history of other specified conditions (02/22/2021), Personal history of urinary calculi, Polycystic ovary syndrome, Postural orthostatic tachycardia syndrome (POTS), Rash and other nonspecific skin eruption (03/15/2022), Repeated falls (06/23/2021), Right lower quadrant pain (10/14/2020), Seizures (Multi), Sjogren syndrome, unspecified (Multi), Sjogren's syndrome, Sleep apnea, Slow transit constipation (07/09/2020),  Subarachnoid hemorrhage, traumatic (Multi) (04/19/2023), Thyroid nodule, Traumatic subarachnoid hemorrhage with loss of consciousness of unspecified duration, subsequent encounter (03/15/2022), Traumatic subarachnoid hemorrhage without loss of consciousness, subsequent encounter, Type 2 diabetes mellitus, Unspecified disorder of refraction (10/07/2022), Unspecified optic neuritis (11/06/2020), Unspecified optic neuritis (11/06/2020), Unspecified visual loss (09/25/2019), Varicella (As a child), Venous insufficiency (chronic) (peripheral) (10/18/2021), Viral infection, unspecified (01/11/2022), Vitamin D deficiency, and Vitamin D deficiency, unspecified (09/28/2022).    Surgical History  She has a past surgical history that includes Other surgical history (08/22/2019); Other surgical history (08/22/2019); Other surgical history (08/22/2019); Other surgical history (08/22/2019); Other surgical history (08/22/2019); Other surgical history (08/22/2019); MR angio neck wo IV contrast (02/08/2021); MR angio head wo IV contrast (02/08/2021); [place] tooth extraction (2004); Appendectomy (2017); Hysterectomy (2017); Hernia repair (Right, 03/01/2024); Cardiac catheterization; Cholecystectomy; Fracture surgery (12/2023); Endometrial ablation; and Brain surgery ([place] placement to measure pressure).     Social History  She reports that she has never smoked. She has never used smokeless tobacco. She reports that she does not currently use alcohol. She reports current drug use. Drug: Benzodiazepines.    Family History  Family History   Problem Relation Name Age of Onset    Other (Perforated bowel) Mother Radha     Hypertension Mother Radha     Macular degeneration Mother Radha     Depression Mother Radha     Hyperlipidemia Mother Radha     Mental illness Mother Radha     Hyperlipidemia Father Mac     Hypertension Father Mac     Heart attack Father Mac     Other (S/P CABG) Father Mac     Diabetes Father Mac      Prostate cancer Father Amc     Coronary artery disease Father Mac     Heart failure Father Mac     Stroke Father Mac     Cancer Father Mac     Macular degeneration Father Mac     Retinal detachment Father Mac     Asthma Father Mac     Hearing loss Father Mac     Heart disease Father Mac     Hernia Father Mac     Stroke Sister Jazmin     Breast cancer Sister Jazmin     Lupus Sister Jazmin     Ovarian cancer Sister Jazmin     Astigmatism Sister Jazmin     Arthritis Sister Jazmin     Asthma Sister Jazmin     Depression Sister Jazmin     Mental illness Sister Jazmin     Miscarriages / Stillbirths Sister Jazmin     Vision loss Sister Jazmin     Hyperlipidemia Brother Sanchez     Hypertension Brother Sanchez     Astigmatism Brother Sanchez     Arthritis Brother Sanchez     Asthma Brother Sanchez     Diabetes Father's Sister Linda     Blindness Father's Sister Linda     Vision loss Father's Sister Linda     Thyroid cancer Father's Sister Bekah     Thyroid disease Father's Sister Jessica     Colon cancer Father's Brother ?     Cancer Father's Brother Kian     Anesthesia related problems Father's Brother Kian     Depression Father's Brother Kian     Hernia Father's Brother Kian     Mental illness Father's Brother Kian     Cancer Maternal Grandmother Brenda     Ovarian cancer Maternal Grandmother Brenda     Blindness Other Multiple     Melanoma Other      Asthma Other      Other (hay fever) Other      Allergies Other      Alcohol abuse Paternal Grandfather Elkin     Arthritis Sister Isha     Asthma Sister Isha     Cancer Sister Isha     Depression Sister Isha     Mental illness Sister Isha     Miscarriages / Stillbirths Sister Isha     Ovarian cancer Sister Isha     Glaucoma Neg Hx          Allergies  Acetazolamide, Atorvastatin, Cefdinir, Ceftriaxone, Doxepin, Duloxetine, Fd and c red no.40, Levofloxacin, Levofloxacin in d5w, Nutritional supplements, Ozempic [semaglutide], Prochlorperazine, Red dye, Rosemary, Rosemary oil, Strawberry,  "Sulfa (sulfonamide antibiotics), Topiramate, Tree pollen-black walnut, Tree pollen-pecan, [place], Aripiprazole, Aspartame, Aspartame (bulk), Fenofibrate, Gluten, Hydrochlorothiazide, Hydromorphone, Iron, Meclizine, Metformin, Propoxyphene, Statins-hmg-coa reductase inhibitors, Tetracyclines, Thiazides, Venlafaxine, Wheat, Adhesive tape-silicones, Barbiturates, Ciprofloxacin, Dhe, Diet foods, Farxiga [dapagliflozin], Ferrous sulfate, Nsaids (non-steroidal anti-inflammatory drug), Other, Sulfonylureas, Tobramycin, Vancomycin, Adhesive, Azithromycin, Betamethasone, House dust, Metoclopramide hcl, Prednisone, Propoxyphene-acetaminophen, Sulfacetamide sodium, [id number] antimigraine agents, and Zolpidem    Review of Systems  Neg other than above     Physical Exam  NAD  Well perfused  Equal chest rise b/l  Incision healing w/ min clear drainage  CECI  Appropriate affect    Aox3  PERRL, EOMI, FS, TM  Ou 20/200  BUE/BLE prox 5 dist 5  SILT     Last Recorded Vitals  Blood pressure 170/89, pulse 97, temperature 36.7 °C (98.1 °F), resp. rate 16, height 1.575 m (5' 2\"), weight 113 kg (250 lb), SpO2 96%.    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 Mederos and found to 10 days of total right eye blindness, SS intact, 10/23 MRI brain/orbit " neg, 10/25 s/p LP (OP28, CP9), 10/29 p/w HA and visual changes, 10/30 s/p R VPS exploration w abdominal catheter repositioning w improvement in distal runnoff, 11/6 p/w min clear incisional drainage, min decr in vision    Patient with minimal clear drainage from incisional site and slightly worsened vision. Will benefit from further labs and imaging    -CTH w/o contrast  -Shunt series  -please consult ophtho to evaluate vision and for papilledema  -please obtain CBC, RFP, ESR, CRP, coag, T+S, EKG, HCG, keep NPO at this time  -further recs pending        Albert Crespo MD

## 2024-11-07 NOTE — PROGRESS NOTES
"Jonathan Ayala is a 48 y.o. female on day 0 of admission presenting with Wound dehiscence.      Subjective   Patient reports no changes in vision. Reports some headaches and leaking from the incision site.        Objective     Last Recorded Vitals  Blood pressure 142/89, pulse 68, temperature 35.3 °C (95.5 °F), temperature source Temporal, resp. rate 20, height 1.575 m (5' 2\"), weight 113 kg (250 lb), SpO2 96%.    Physical Exam  Base Eye Exam       Visual Acuity (Snellen - Linear)         Right Left    Near sc 20/40 PH 20/25 20/100 PH 20/40              Tonometry (Tonopen, 1:37 PM)         Right Left    Pressure 17 15              Pupils         Dark Light Shape React APD    Right 5 3 Round Sluggish None    Left 5 3 Round Sluggish None              Visual Fields         Left Right                                Extraocular Movement         Right Left     Full, Ortho Full, Ortho              Neuro/Psych       Oriented x3: Yes    Mood/Affect: Normal                  Additional Tests       Color         Right Left    [place] 5/11 5/11                  Slit Lamp and Fundus Exam       External Exam         Right Left    External Normal Normal              Slit Lamp Exam         Right Left    Lids/Lashes Normal Normal    Conjunctiva/Sclera White and quiet White and quiet    Cornea Clear Clear    Anterior Chamber Deep and quiet Deep and quiet    Iris Round and reactive Round and reactive    Lens Clear Clear                      Assessment/Plan   Assessment & Plan  Wound dehiscence    #Idiopathic intracranial hypertension  #NAION, both eyes   #S/p  shunt  - Jonathan Ayala is a 48 y.o. female with PMH of left non-arteritic anterior ischemic optic neuropathy, bilateral sequential vision loss with right eye improvement 11/13/2019, idiopathic intracranial hypertension status post ventriculoperitoneal shunt , seizures, scleroderma, Sjogren, CVID on monthly IVIG, POTS, HTN, DM2, hypothyroidism, BRENT on CPAP, migraine  recently " s/p  shunt revision on 10/30 presenting for clear incisional drainage, ophthalmology consulted to check visual acuity and rule out optic disc edema.   - 11/7, visual acuity is improved from recent outpatient visit with Dr. Mederos, and approximately stable to last eye exam on discharge. There is no optic disc edema, only pallor as noted in outpatient visit.   - CTH with stable appearance of ventricles.   - Patient discussed with neurosurgery and planned to be admitted for wound washout and possible shunt explanation.   - Ophthalmology will continue to follow.         Andry Bailey MD, PhD  Ophthalmology, PGY3     Ophthalmology Adult Pager - [phone]  Ophthalmology Pediatrics Pager (M-F 8:00am-5:00pm) - [phone]     For adult follow-up appointments, call: [phone]  For pediatric follow-up appointments, call: [phone]

## 2024-11-07 NOTE — ED PROVIDER NOTES
Emergency Department Provider Note        History of Present Illness     History provided by: Patient  Limitations to History: None    HPI:  Jonathan Ayala is a 48 y.o. female History of idiopathic intracranial hypertension with focal epilepsy and hemiplegic migraines, CVID on IVIG, hypothyroidism hypertension diabetes and left-sided ischemic optic neuropathy presented to the ED for concern of possible infection.  Patient recently had revision of her  shunt and now stating leakage from the site.  Patient states that it is yellowish leakage.  Patient states she was told by her neurosurgeon to come to the ED for evaluation by neurosurgery.  Patient also stating associated worsening headaches does better with lying down as well as associated nausea.  Physical Exam   Triage vitals:  T 36.1 °C (96.9 °F)  HR 82  /77  RR 16  O2 96 % None (Room air)    Physical Exam  Constitutional:       Appearance: Normal appearance.   HENT:      Head: Normocephalic.   Cardiovascular:      Rate and Rhythm: Normal rate.   Pulmonary:      Effort: Pulmonary effort is normal.   Abdominal:      General: Abdomen is flat.   Musculoskeletal:         General: Normal range of motion.      Cervical back: Normal range of motion.   Skin:     General: Skin is warm.   Neurological:      Mental Status: She is alert.          Medical Decision Making & ED Course   Medical Decision Making:  Patient is a 48-year-old female resenting to the ED for postop infection.  Patient was sent by her neurosurgeon for evaluation.  Patient states that she has had yellowish leakage coming from her surgical site states associated headaches and nausea.  Patient states that the headaches better when lying down.  Neurosurgery was consulted please see ED course for further details  ----      EKG Independent Interpretation: EKG interpreted by myself. Please see ED Course for full interpretation.        The patient was discussed with the following consultants/services:  Neurosurgery      ED Course as of 11/08/24 1452   Wed Nov 06, 2024   2231 I assumed care of patient at 2200, patient can see shapes, pictures that are not clear out of right eye, legally blind in left eye. Optho and NSGY following [SA]   Thu Nov 07, 2024   0012 CRP is elevated to 1.36 from prior, CMP overall within normal limits, CBC with normal WBC count, platelets, hemoglobin is 11.5 which is at baseline, ESR slightly elevated at 36 [SA]   0105 Optho evaluated patient, no appreciable papilledema [SA]   0105 CTH reviewed:  IMPRESSION:  CT HEAD:  1. No acute intracranial abnormality.  2. Stable positioning of right frontal approach ventricular shunt  catheter with the tip terminating at the foramen of [place]ro. Stable  appearance of the ventricles.   [SA]   0106 XR shunt series reviewed:  IMPRESSION:  Radiopaque portions of the shunt tubing show no kink or discontinuity.   [SA]   0323 Discussed with NSGY who will admit patient to their service for postop concern [SA]      ED Course User Index  [SA] Tiffany Alvarez,          Diagnoses as of 11/08/24 1452   Wound dehiscence          Disposition   Patient was signed out to oncoming provider pending completion of their work-up.  Please see the next provider's transition of care note for the remainder of the patient's care.     Procedures   Procedures    Patient seen and discussed with ED attending physician.    Josefina Gunter MD  Emergency Medicine       Josefina Gunter MD  Resident  11/08/24 1453

## 2024-11-07 NOTE — PROGRESS NOTES
"Pharmacy Medication History Review    Jonathan Ayala is a 48 y.o. female admitted for Wound dehiscence. Pharmacy reviewed the patient's [id number] medications and allergies for accuracy.    Medications ADDED:  Miconazole Cream  Medications CHANGED:  Fluticasone Nasal [place]  Promethazine  Medications REMOVED:   None     The list below reflects the updated PTA list.   Prior to Admission Medications   Prescriptions Last Dose Informant   Advair -21 mcg/actuation inhaler 11/6/2024 Self   Sig: INHALE TWO PUFFS BY MOUTH AS INSTRUCTED TWO TIMES A DAY.   BD Ultra-Fine Mini Pen Needle 31 gauge x 3/16\" needle     Sig: USE AS DIRECTED FIVE TIMES A DAY.   Dexcom G7  misc  Self   Sig: Use as instructed to check blood sugars continuously throughout the day   EPINEPHrine 0.3 mg/0.3 mL injection syringe  Self   Sig: INJECT INTRAMUSCULARLY ONCE AS NEEDED FOR ANAPHYLAXIS   Motegrity 2 mg tablet 11/6/2024 Self   Sig: Take 1 tablet (2 mg) by mouth once daily.   OneTouch Delica Plus Lancet 33 gauge misc     Sig: USE 1 LANCET TO CHECK GLUCOSE ONCE DAILY AS DIRECTED   OneTouch Verio test strips strip     Sig: USE 1 STRIP TO CHECK GLUCOSE ONCE DAILY AS DIRECTED   ZINC ORAL  Self   Sig: Take 50 mg by mouth once daily.   acetaminophen (Tylenol) 325 mg tablet  Self   Sig: Take 1-2 tablets (325-650 mg) by mouth every 6 hours if needed for mild pain (1 - 3). Days of infusions   amitriptyline (Elavil) 100 mg tablet 11/6/2024 Self   Sig: Take 1 tablet (100 mg) by mouth once daily at bedtime.   azelastine (Astelin) 137 mcg (0.1 %) nasal spray 11/6/2024 Self   Sig: Administer 1 spray into each nostril 2 times a day. Use in each nostril as directed   blood-glucose sensor (DEXCOM G7 SENSOR MISC)     Sig: Use to check blood sugar continuously throughout the day as directed. Change sensor every 10 days.   calcium-vitamin D3-vitamin K (Viactiv) 650 mg-12.5 mcg-40 mcg chewable tablet 11/6/2024 Self   Sig: Chew 2 tablets once daily. " At lunch   carboxymethylcellulose (Refresh Celluvisc) 1 % ophthalmic solution dropperette  Self   Sig: Administer 2 drops into both eyes 3 times a day as needed for dry eyes.   cholecalciferol (Vitamin D-3) 5,000 Units tablet  Self   Sig: Take 1 tablet (5,000 Units) by mouth once daily.   clonazePAM (KlonoPIN) 0.5 mg tablet 11/6/2024 Self   Sig: Take 1 tablet (0.5 mg) by mouth 2 times a day.   diphenhydrAMINE (BENADryl) 12.5 mg/5 mL liquid  Self   Sig: Take 10-20 mL (25-50 mg) by mouth once daily as needed for allergies (or migraines).   disposable gloves (Disposable Latex-Free Gloves) misc     Sig: Use as needed   divalproex (Depakote ER) 500 mg 24 hr tablet 11/6/2024 Self   Sig: Take 1 tablet (500 mg) by mouth 2 times a day.   ezetimibe (Zetia) 10 mg tablet  Self   Sig: Take 1 tablet (10 mg) by mouth once daily.   fluticasone (Flonase) 50 mcg/actuation nasal spray 11/6/2024 Self   Sig: USE 1 SPRAY INTO EACH NOSTRIL ONCE DAILY.   Patient taking differently: Administer 1 spray into each nostril 2 times a day.   folic acid (Folvite) 1 mg tablet 11/6/2024 Self   Sig: Take 1 tablet (1 mg) by mouth once daily.   furosemide (Lasix) 20 mg tablet 11/6/2024 Self   Sig: Take 1 tablet (20 mg) by mouth 2 times a day.   gloves, latex with aloe vera misc     Sig: Large size gloves   glucagon (Baqsimi) 3 mg/actuation spray,non-aerosol  Self   Sig: USE ONE SPRAY IN THE NOSE AS NEEDED FOR LOW BLOOD SUGAR  MAY REPEAT AFTER 15 MINUTES USING A NEW DEVICE IF NO RESPONSE   immune globulin, human, (Gammagard) infusion  Self   Sig: Infuse 600 mL (60 g) into a venous catheter every 14 (fourteen) days.   insulin glargine (Toujeo Max Solostar- 2 unit dial) 300 unit/mL (3 mL) injection 11/6/2024 Self   Sig: Inject 52 Units under the skin once daily in the morning. Inject 50 units under skin once daily      insulin lispro (HumaLOG KwikPen Insulin) 100 unit/mL injection  Self   Sig: Use as directed to give up to 100 units a day    ipratropium-albuteroL (Duo-Neb) 0.5-2.5 mg/3 mL nebulizer solution  Self   Sig: Inhale 3 mL 4 times a day as needed.   lacosamide (Vimpat) 200 mg tablet tablet  Self   Sig: Take 1 tablet (200 mg) by mouth 2 times a day.  Total dose 250 mg twice daily    lacosamide (Vimpat) 50 mg tablet 11/6/2024 Self   Sig: Take 1 tablet (50 mg) by mouth every 12 hours.  Total dose 250 mg twice daily    lasmiditan 100 mg tablet  Self   Sig: Take 100 mg by mouth if needed (migraine). Do not drive in 8 hours of taking this medicine. Maximum one dose per 24 hours.   levETIRAcetam (Keppra) 750 mg tablet 11/6/2024 Self   Sig: Take 2 tablets (1,500 mg) by mouth 2 times a day.   levothyroxine (Synthroid, Levoxyl) 50 mcg tablet 11/6/2024 Self   Sig: Take 1.5 tablets (75 mcg) by mouth once daily in the morning. Take before meals.   miconazole (Micotin) 2 % cream  Self   Sig: Apply 1 Application topically once daily as needed (rash).   miconazole (Micotin) 2 % powder  Self   Sig: Apply to groin , under the breast and skin folds daily   miscellaneous medical supply misc     Sig: Bath Wipes  fragrance free   moisturizing mouth (Biotene Oral Dry Mouth) solution  Self   Sig: Take 1 spray by mouth 4 times a day as needed.   montelukast (Singulair) 10 mg tablet 11/6/2024 Self   Sig: Take 1 tablet (10 mg) by mouth once daily at bedtime.   nasal spray Nayzilam 5 mg/spray (0.1 mL) spray,non-aerosol  Self   Sig: Administer into affected nostril(s).   ondansetron ODT (Zofran-ODT) 4 mg disintegrating tablet  Self   Sig: Take 1 tablet (4 mg) by mouth every 8 hours if needed for nausea or vomiting.   oxyCODONE (Roxicodone) 5 mg immediate release tablet  Self   Sig: Take 1 tablet (5 mg) by mouth every 6 hours if needed for moderate pain (4 - 6) or severe pain (7 - 10) for up to 7 days.   pantoprazole (Protonix) 40 mg EC tablet 11/6/2024 Self   Sig: Take 1 tablet (40 mg) by mouth 2 times a day before meals.   polyethylene glycol (Glycolax, Miralax) 17  gram/dose powder  Self   Sig: Mix 17 g of powder and drink once daily as needed for constipation.   promethazine (Phenergan) 12.5 mg tablet  Self   Sig: Take 1 tablet (12.5 mg) by mouth if needed for nausea or vomiting. if ondanseron ineffective   propranolol LA (Inderal LA) 60 mg 24 hr capsule 11/6/2024 Self   Sig: Take 1 capsule (60 mg) by mouth once daily. Do not crush, chew, or split.   rOPINIRole (Requip) 0.5 mg tablet 11/6/2024 Self   Sig: Take 1 tablet by mouth (0.5mg) in the morning and 2 tablets (1mg) by mouth in the evening   senna 8.6 mg tablet  Self   Sig: Take 1-2 tablets (8.6-17.2 mg) by mouth as needed at bedtime for constipation.   tiZANidine (Zanaflex) 2 mg tablet 11/6/2024 Self   Sig: Take 1 tablet (2 mg) by mouth every 8 hours if needed for muscle spasms.   ubrogepant (Ubrelvy) 100 mg tablet tablet  Self   Sig: Take 1 tablet (100 mg) by mouth if needed (migraine). May repeat in 2 hours for max of 200mg per 24 hours.      Facility-Administered Medications: None        The list below reflects the updated allergy list. Please review each documented allergy for additional clarification and justification.  Allergies  Reviewed by Jaimie Mo RN on 11/6/2024        Severity Reactions Comments    Acetazolamide High Hives, Rash, Shortness of breath, Swelling oral hives, redness, ABD pain    Atorvastatin High Unknown Causes muscle pain    Cefdinir High Itching, Rash, Shortness of breath, Anaphylaxis Itchy throat Throat closing / difficulty breathing.  Took Benadryl at home. Itchy throat      Throat closing / difficulty breathing.  Took Benadryl at home.    Ceftriaxone High Anaphylaxis, Rash, Shortness of breath, Swelling SOB SOB hives turned red during gallbladder    Doxepin High Anaphylaxis, Hives, Swelling, Angioedema, Rash Itchy sore throat problems with swallowing facial swelling Itchy sore throat problems with swallowing      facial swelling    Duloxetine High Anaphylaxis, Hallucinations, Rash  "suicidal ideation suicidal ideation  Other reaction(s): suicidal ideation suicidal Suicidal ideation    Fd And C Red No.40 High Anaphylaxis     Levofloxacin High Angioedema, Swelling, Rash eyelid swelling eyelid swelling. Patient tolerates Avelox    Levofloxacin In D5w High Anaphylaxis [place] face lips swelling just Levaquin tolerated D5W)    Nutritional Supplements High Anaphylaxis Grapes ( red dye    Ozempic [semaglutide] High Nausea/vomiting Severe gastroparesis worsening     Prochlorperazine High Anaphylaxis HIGH Compazine involuntary muscle spasms low doses tolerated)    Red Dye High Anaphylaxis     Becky High Anaphylaxis, Swelling Rosemary SOB facial swelling    Becky Oil High Anaphylaxis, Itching, Swelling Rosemary  SOB facial swelling      SOB facial swelling    Strawberry High Shortness of breath White blisters in mouth      Other reaction(s): white blisters in mouth, shortness of breath    Sulfa (sulfonamide Antibiotics) High Anaphylaxis, Rash, Shortness of breath, Swelling SOB itchy hives Other Reaction(s): rash, SOB      SOB itchy hives    Topiramate High Anaphylaxis, Swelling, Angioedema eyelid swelling Throat swelling eyelid swelling  Throat swelling      Throat swelling      eyelid swelling    Tree Pollen-black [place] High Shortness of breath Other Reaction(s): Other: See Comments      White blisters in mouth      blisters in mouth-carries an EPIPEN-\"I have gotten short of breath\"    Tree Pollen-pecan High Shortness of breath pecans    [place]uldin High Shortness of breath     Aripiprazole Medium Unknown, Fever, Other fever and tremors Fevers and Tremors Tremor    Aspartame Medium Diarrhea Blood sugar [place], Diarrhea    Aspartame (bulk) Medium Diarrhea, Nausea Only, Nausea/vomiting Other Reaction(s): nausea, diarrhea, abdominal pain      Blood sugar [place], Diarrhea    Fenofibrate Medium Other Double vision    Gluten Medium Itching, Other, Rash Rashes constipation gastric discomfort    " Hydrochlorothiazide Medium Hives, Itching, Rash hctz removed as free-text allergy and entered as [facility] allergy,1455 10/6/2007JO      itchy hives    Hydromorphone Medium Unknown, GI intolerance, Nausea Only Intolerance nausea instant    Iron Medium Hives, Rash ichy hive ( can tolerate ferrous gluconate)    Meclizine Medium Angioedema, Other, Swelling eyelid swelling Swelling of the eyelids Eyelids and face    Metformin Medium Other Other reaction(s): Dyspepsia, Dyspepsia    Propoxyphene Medium Unknown, Hives Darvocet itchy hives    Statins-hmg-coa Reductase Inhibitors Medium Unknown Double vision    Tetracyclines Medium Hives, Itching, Rash sulfa Rash itching Itchy  rash    Thiazides Medium Hives, Itching, Rash itchy hives    Venlafaxine Medium Unknown, Fever Fevers chills    Wheat Medium Unknown oral blisters    Adhesive Tape-silicones Not Specified Itching, Other     Barbiturates Not Specified Unknown     Ciprofloxacin Not Specified Hives     Dhe Not Specified Unknown Raynaud's disease    Diet Foods Not Specified Diarrhea Aspartame allergy only. Removes to many foods to interface.    Farxiga [dapagliflozin] Not Specified Other Frequent yeast infections and UTI    Ferrous Sulfate Not Specified Hives     Nsaids (non-steroidal Anti-inflammatory Drug) Not Specified Unknown, Nausea/vomiting     Other Not Specified Unknown     Sulfonylureas Not Specified Unknown     Tobramycin Not Specified Unknown     Vancomycin Not Specified Unknown, Hives Niyah syndrome Other reaction(s): Other Red man syndrome if given fast, benadryl with.  Niyah syndrome  Other reaction(s): Other      Other reaction(s): Other      Red man syndrome if given fast, benadryl with.    Adhesive Low Rash     Azithromycin Low Hives, Itching, Rash     Betamethasone Low Nausea And Vomiting, Other, GI intolerance, Diarrhea N/V/D    House Dust Low Rash     Metoclopramide Hcl Low Other     Prednisone Low Rash     Propoxyphene-acetaminophen Low Hives, Rash      "Sulfacetamide Sodium Low Rash, Swelling     [id number] Antimigraine Agents Low Nausea Only None due to Raynaud's  ( Triptans cause a stroke)    Zolpidem Low Other, Hallucinations Sleep walk Other Reaction(s): insomnia and agitation      Sleep walk            Patient declines M2B at discharge.     Sources:   Patient interview (good historian)  [facility]  Care Everywhere  Medication fill history    Additional Comments:  None to note    Evelyn Villela formerly [facility]  Transitions of Care Pharmacist  11/07/24     Secure Chat preferred   If no response call [phone] or [email or web address] \"Med Rec\"    "

## 2024-11-08 ENCOUNTER — TELEPHONE (OUTPATIENT)
Dept: ENDOCRINOLOGY | Facility: CLINIC | Age: 49
End: 2024-11-08
Payer: MEDICAID

## 2024-11-08 ENCOUNTER — DOCUMENTATION (OUTPATIENT)
Dept: PRIMARY CARE | Facility: CLINIC | Age: 49
End: 2024-11-08
Payer: MEDICAID

## 2024-11-08 VITALS
OXYGEN SATURATION: 95 % | HEART RATE: 93 BPM | WEIGHT: 250 LBS | TEMPERATURE: 97 F | DIASTOLIC BLOOD PRESSURE: 74 MMHG | BODY MASS INDEX: 46.01 KG/M2 | SYSTOLIC BLOOD PRESSURE: 108 MMHG | HEIGHT: 62 IN | RESPIRATION RATE: 16 BRPM

## 2024-11-08 LAB
APPEARANCE UR: CLEAR
ATRIAL RATE: 90 BPM
BACTERIA #/AREA URNS AUTO: ABNORMAL /HPF
BILIRUB UR STRIP.AUTO-MCNC: NEGATIVE MG/DL
COLOR UR: YELLOW
GLUCOSE BLD MANUAL STRIP-MCNC: 229 MG/DL (ref 74–99)
GLUCOSE BLD MANUAL STRIP-MCNC: 267 MG/DL (ref 74–99)
GLUCOSE UR STRIP.AUTO-MCNC: NORMAL MG/DL
KETONES UR STRIP.AUTO-MCNC: NEGATIVE MG/DL
LEUKOCYTE ESTERASE UR QL STRIP.AUTO: NEGATIVE
MUCOUS THREADS #/AREA URNS AUTO: ABNORMAL /LPF
NITRITE UR QL STRIP.AUTO: NEGATIVE
P AXIS: 48 DEGREES
P OFFSET: 190 MS
P ONSET: 128 MS
PH UR STRIP.AUTO: 7.5 [PH]
PR INTERVAL: 186 MS
PROT UR STRIP.AUTO-MCNC: NORMAL MG/DL
Q ONSET: 221 MS
QRS COUNT: 15 BEATS
QRS DURATION: 84 MS
QT INTERVAL: 392 MS
QTC CALCULATION(BAZETT): 479 MS
QTC FREDERICIA: 448 MS
R AXIS: -4 DEGREES
RBC # UR STRIP.AUTO: NEGATIVE /UL
RBC #/AREA URNS AUTO: ABNORMAL /HPF
SP GR UR STRIP.AUTO: 1.03
SQUAMOUS #/AREA URNS AUTO: ABNORMAL /HPF
T AXIS: 43 DEGREES
T OFFSET: 417 MS
UROBILINOGEN UR STRIP.AUTO-MCNC: NORMAL MG/DL
VENTRICULAR RATE: 90 BPM
WBC #/AREA URNS AUTO: ABNORMAL /HPF

## 2024-11-08 PROCEDURE — 2500000001 HC RX 250 WO HCPCS SELF ADMINISTERED DRUGS (ALT 637 FOR MEDICARE OP): Performed by: STUDENT IN AN ORGANIZED HEALTH CARE EDUCATION/TRAINING PROGRAM

## 2024-11-08 PROCEDURE — 2500000004 HC RX 250 GENERAL PHARMACY W/ HCPCS (ALT 636 FOR OP/ED): Performed by: PHYSICIAN ASSISTANT

## 2024-11-08 PROCEDURE — 82947 ASSAY GLUCOSE BLOOD QUANT: CPT

## 2024-11-08 PROCEDURE — 99239 HOSP IP/OBS DSCHRG MGMT >30: CPT | Performed by: PHYSICIAN ASSISTANT

## 2024-11-08 PROCEDURE — 2500000004 HC RX 250 GENERAL PHARMACY W/ HCPCS (ALT 636 FOR OP/ED)

## 2024-11-08 PROCEDURE — G0378 HOSPITAL OBSERVATION PER HR: HCPCS

## 2024-11-08 PROCEDURE — 2500000001 HC RX 250 WO HCPCS SELF ADMINISTERED DRUGS (ALT 637 FOR MEDICARE OP): Performed by: PHYSICIAN ASSISTANT

## 2024-11-08 PROCEDURE — 2500000002 HC RX 250 W HCPCS SELF ADMINISTERED DRUGS (ALT 637 FOR MEDICARE OP, ALT 636 FOR OP/ED): Performed by: PHYSICIAN ASSISTANT

## 2024-11-08 PROCEDURE — 2500000002 HC RX 250 W HCPCS SELF ADMINISTERED DRUGS (ALT 637 FOR MEDICARE OP, ALT 636 FOR OP/ED): Performed by: STUDENT IN AN ORGANIZED HEALTH CARE EDUCATION/TRAINING PROGRAM

## 2024-11-08 RX ORDER — INSULIN GLARGINE 100 [IU]/ML
50 INJECTION, SOLUTION SUBCUTANEOUS EVERY 24 HOURS
Status: DISCONTINUED | OUTPATIENT
Start: 2024-11-08 | End: 2024-11-08 | Stop reason: HOSPADM

## 2024-11-08 RX ORDER — HEPARIN 100 UNIT/ML
5 SYRINGE INTRAVENOUS AS NEEDED
Status: DISCONTINUED | OUTPATIENT
Start: 2024-11-08 | End: 2024-11-08 | Stop reason: HOSPADM

## 2024-11-08 RX ADMIN — LACOSAMIDE 50 MG: 50 TABLET, FILM COATED ORAL at 08:36

## 2024-11-08 RX ADMIN — Medication 5000 UNITS: at 08:35

## 2024-11-08 RX ADMIN — INSULIN GLARGINE 50 UNITS: 100 INJECTION, SOLUTION SUBCUTANEOUS at 09:56

## 2024-11-08 RX ADMIN — DIVALPROEX SODIUM 500 MG: 500 TABLET, FILM COATED, EXTENDED RELEASE ORAL at 08:35

## 2024-11-08 RX ADMIN — LEVETIRACETAM 1500 MG: 250 TABLET, FILM COATED ORAL at 08:35

## 2024-11-08 RX ADMIN — HEPARIN SODIUM 5000 UNITS: 5000 INJECTION, SOLUTION INTRAVENOUS; SUBCUTANEOUS at 00:10

## 2024-11-08 RX ADMIN — LEVOTHYROXINE SODIUM 75 MCG: 75 TABLET ORAL at 06:16

## 2024-11-08 RX ADMIN — INSULIN LISPRO 2 UNITS: 100 INJECTION, SOLUTION INTRAVENOUS; SUBCUTANEOUS at 12:04

## 2024-11-08 RX ADMIN — FUROSEMIDE 20 MG: 20 TABLET ORAL at 08:35

## 2024-11-08 RX ADMIN — MICONAZOLE NITRATE: 20 CREAM TOPICAL at 08:41

## 2024-11-08 RX ADMIN — FLUTICASONE PROPIONATE AND SALMETEROL XINAFOATE 2 PUFF: 230; 21 AEROSOL, METERED RESPIRATORY (INHALATION) at 08:37

## 2024-11-08 RX ADMIN — AZELASTINE HYDROCHLORIDE 1 SPRAY: 137 SPRAY, METERED NASAL at 08:36

## 2024-11-08 RX ADMIN — INSULIN LISPRO 3 UNITS: 100 INJECTION, SOLUTION INTRAVENOUS; SUBCUTANEOUS at 08:32

## 2024-11-08 RX ADMIN — HEPARIN SODIUM 5000 UNITS: 5000 INJECTION, SOLUTION INTRAVENOUS; SUBCUTANEOUS at 08:36

## 2024-11-08 RX ADMIN — PRUCALOPRIDE 2 MG: 1 TABLET, FILM COATED ORAL at 08:37

## 2024-11-08 RX ADMIN — PANTOPRAZOLE SODIUM 40 MG: 40 TABLET, DELAYED RELEASE ORAL at 06:15

## 2024-11-08 RX ADMIN — EZETIMIBE 10 MG: 10 TABLET ORAL at 08:36

## 2024-11-08 RX ADMIN — ROPINIROLE HYDROCHLORIDE 0.5 MG: 0.5 TABLET, FILM COATED ORAL at 06:15

## 2024-11-08 RX ADMIN — HEPARIN 500 UNITS: 100 SYRINGE at 17:20

## 2024-11-08 RX ADMIN — FLUTICASONE PROPIONATE 1 SPRAY: 50 SPRAY, METERED NASAL at 08:36

## 2024-11-08 RX ADMIN — PROPRANOLOL HYDROCHLORIDE 60 MG: 60 CAPSULE, EXTENDED RELEASE ORAL at 08:36

## 2024-11-08 RX ADMIN — ROPINIROLE HYDROCHLORIDE 2 MG: 2 TABLET, FILM COATED ORAL at 17:18

## 2024-11-08 RX ADMIN — PANTOPRAZOLE SODIUM 40 MG: 40 TABLET, DELAYED RELEASE ORAL at 17:18

## 2024-11-08 RX ADMIN — LACOSAMIDE 200 MG: 100 TABLET, FILM COATED ORAL at 08:35

## 2024-11-08 RX ADMIN — FOLIC ACID 1 MG: 1 TABLET ORAL at 08:35

## 2024-11-08 RX ADMIN — CLONAZEPAM 0.5 MG: 0.5 TABLET ORAL at 08:35

## 2024-11-08 ASSESSMENT — COGNITIVE AND FUNCTIONAL STATUS - GENERAL
MOBILITY SCORE: 23
DAILY ACTIVITIY SCORE: 24
CLIMB 3 TO 5 STEPS WITH RAILING: A LITTLE

## 2024-11-08 ASSESSMENT — PAIN - FUNCTIONAL ASSESSMENT
PAIN_FUNCTIONAL_ASSESSMENT: 0-10
PAIN_FUNCTIONAL_ASSESSMENT: 0-10

## 2024-11-08 ASSESSMENT — PAIN SCALES - GENERAL
PAINLEVEL_OUTOF10: 2
PAINLEVEL_OUTOF10: 2

## 2024-11-08 NOTE — CARE PLAN
The clinical goals for the shift include safety, comfort    Patient oriented x4, vitals WNL at baseline. Complain of mild head incision site pain, denies intervention. Incision site remains free of drainage at this time. Ambulatory in room as tolerated. Anticipated discharge home tbd.      Problem: Pain - Adult  Goal: Verbalizes/displays adequate comfort level or baseline comfort level  Outcome: Progressing     Problem: Safety - Adult  Goal: Free from fall injury  Outcome: Progressing     Problem: Discharge Planning  Goal: Discharge to home or other facility with appropriate resources  Outcome: Progressing     Problem: Chronic Conditions and Co-morbidities  Goal: Patient's chronic conditions and co-morbidity symptoms are monitored and maintained or improved  Outcome: Progressing     Problem: Fall/Injury  Goal: Not fall by end of shift  Outcome: Progressing  Goal: Be free from injury by end of the shift  Outcome: Progressing  Goal: Verbalize understanding of personal risk factors for fall in the hospital  Outcome: Progressing

## 2024-11-08 NOTE — PROGRESS NOTES
Discharge instructions reviewed with patient at bedside. Discussed new medications, post op care/incision care instructions, and follow up appointments. Patient verbalizes understanding, all questions answered at this time. IV removed, awaiting transport for discharge home with diana.

## 2024-11-08 NOTE — TELEPHONE ENCOUNTER
Received faxed request from [facility] [employer] [facility] on 11/07 for most recent office note.    Note from Oct 2024 faxed to [phone] on 11/8

## 2024-11-08 NOTE — PROGRESS NOTES
"Jonathan Ayala is a 48 y.o. female on day 1 of admission presenting with Wound dehiscence.      Subjective   Patient reports no change in vision. No no concerns today. Feels that the headaches are getting better and that the wound leak is slowing.        Objective     Last Recorded Vitals  Blood pressure 99/64, pulse 92, temperature 36.4 °C (97.5 °F), resp. rate 18, height 1.575 m (5' 2\"), weight 113 kg (250 lb), SpO2 96%.    Physical Exam  Base Eye Exam       Visual Acuity (Snellen - Linear)         Right Left    Near sc 20/30 PH 20/25 20/100 PH 20/40              Tonometry (Tonopen, 11:38 AM)         Right Left    Pressure 20 18              Pupils         Pupils Dark Light Shape React APD    Right PERRL, No APD 4 2 Round Sluggish None    Left PERRL, No APD 4 2 Round Sluggish None              Visual Fields         Left Right                                Extraocular Movement         Right Left     Full, Ortho Full, Ortho              Neuro/Psych       Oriented x3: Yes    Mood/Affect: Normal                  Additional Tests       Color         Right Left    Ish[place]ara 3/11 3/11                  Slit Lamp and Fundus Exam       External Exam         Right Left    External Normal Normal              Slit Lamp Exam         Right Left    Lids/Lashes Normal Normal    Conjunctiva/Sclera White and quiet White and quiet    Cornea Clear Clear    Anterior Chamber Deep and quiet Deep and quiet    Iris Round and reactive Round and reactive    Lens Clear Clear                      Assessment/Plan   Assessment & Plan  Wound dehiscence    #Idiopathic intracranial hypertension  #NAION, both eyes   #S/p  shunt  - Jonathan Ayala is a 48 y.o. 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   - Visual acuity is improved from recent outpatient visit with Dr. Mederos, and approximately stable to last eye exam on discharge. There is no optic disc edema, only pallor as noted in outpatient visit.   - CTH with stable appearance of ventricles  - Defer to Neurosurgery regarding wound leak  - Ophthalmology will continue to follow        Andry Bailey MD, PhD  Ophthalmology, PGY3     Ophthalmology Adult Pager - [phone]  Ophthalmology Pediatrics Pager (M-F 8:00am-5:00pm) - [phone]     For adult follow-up appointments, call: [phone]  For pediatric follow-up appointments, call: [phone]

## 2024-11-08 NOTE — SIGNIFICANT EVENT
Rapid Response Nurse Note: RADAR alert: 8    Pager time: [phone]  Arrival time: 406  Event end time: 412  Location: T4-56  [x] Triage by phone or secure messaging    Rapid response initiated by:  [] Rapid response RN [] Family [] Nursing Supervisor [] Physician   [x] RADAR auto page [] Sepsis auto-page [] RN [] RT   [] NP/PA [] Other:     Primary reason for call:   [] BAT [] New CPAP/BiPAP [] Bleeding [] Change in mental status   [] Chest pain [] Code blue [] FiO2 >/= 50% [] HR </= 40 bpm   [] HR >/= 130 bpm [] Hyperglycemia [] Hypoglycemia [x] RADAR    [] RR </= 8 bpm [] RR >/= 30 bpm [] SBP </= 90 mmHg [] SpO2 < 90%   [] Seizure [] Sepsis [] Shortness of breath  [] Staff concern: see comments     Initial VS and/or RADAR VS: T 35.1 °C; HR 89; RR 18; BP 97/64; SPO2 91%.    Providers present at bedside (if applicable):     Name of ICU Provider contacted (if applicable):     Interventions:  [x] None [] ABG/VBG [] Assist w/ICU transfer [] BAT paged    [] Bag mask [] Blood [] Cardioversion [] Code Blue   [] Code blue for intubation [] Code status changed [] Chest x-ray [] EKG   [] IV fluid/bolus [] KUB x-ray [] Labs/cultures [] Medication   [] Nebulizer treatment [] NIPPV (CPAP/BiPAP) [] Oxygen [] Oral airway   [] Peripheral IV [] Palliative care consult [] CT/MRI [] Sepsis protocol    [] Suctioned [] Other:     Outcome:  [] Coded and  [] Code blue for intubation [] Coded and transferred to ICU []  on division   [x] Remained on division (no change) [] Remained on division + additional monitoring [] Remained in ED [] Transferred to ED   [] Transferred to ICU [] Transferred to inpatient status [] Transferred for interventions (procedure) [] Transferred to ICU stepdown    [] Transferred to surgery [] Transferred to telemetry [] Sepsis protocol [] STEMI protocol   [] Stroke protocol [x] Bedside nurse instructed to page rapid response for any concerns or acute change in condition/VS     Additional Comments:  Bedside RN has no concerns at this time.

## 2024-11-08 NOTE — DISCHARGE SUMMARY
Discharge Diagnosis  Wound dehiscence    Test Results Pending At Discharge  Pending Labs       Order Current Status    CSF Culture/Smear Preliminary result          Hospital Course  Jonathan Ayala is a 48 y.o. 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 Recent workups include 9/17 presenting with 1 month R eye blurry vision, MRI brain RF encephalomalaxcia, MR orbit L optic nerve STIR signal, MRV negative, 9/18 s/p LP (OP 52), 9/21 s/p BAT for L hemiplegia, CTH/CTA stable RF encephalomalacia, no LVO, MRI  neg, 9/24/2024 s/p RF  shunt (certas at 5), 10/23 presenting after ophtho clinic with Dr. Mederos and found to 10 days of total right eye blindness, SS intact, 10/23 MRI brain/orbit neg, 10/25 s/p LP (OP28, CP9), 10/29 p/w HA and visual changes, 10/30 s/p R VPS exploration w abdominal catheter repositioning w improvement in distal runnoff. Now returned to [facility] ED 11/6 with mininmal clear incisional drainage and minimal decrease in vision. CTH stable, SS intact (Certas at 3), shunt tap with spontaneous flow. Ophthalmology evaluated patient with roughly stable exam. Patient admitted to neurosurgery service for further monitoring.     11/7 Incision oversewn at site of leaking.   11/8 No further leakage noted. Improved ophthalmology examination. CSF cultures remained negative.     On the day of discharge, the pt was tolerating a diet, pain was controlled on PO pain medication, and they were ambulating and voiding spontaneously. They were discharged in stable condition with detailed instructions and close scheduled outpatient follow up.    Pertinent Physical Exam At Time of  "Discharge  Constitutional: A&Ox3, calm and cooperative, NAD.  Eyes: PERRL, clear sclera .  ENMT: Moist mucous membranes, no apparent injuries or lesions.  Cardiovascular: Normal rate and regular rhythm. 2+ equal pulses of the distal extremities.  Respiratory/Thorax: CTAB, regular respirations on RA. Good symmetric chest expansion.   Gastrointestinal: Abdomen soft, non tender.   Extremities: Follows commands x4. 5/5 strength throughout.   Neurological: A&Ox3.   Psychological: Appropriate mood and behavior.   Skin: Cranial incision C/D/I without evidence of further leakage.     Home Medications     Medication List      CHANGE how you take these medications     fluticasone 50 mcg/actuation nasal spray; Commonly known as: Flonase;   USE 1 SPRAY INTO EACH NOSTRIL ONCE DAILY.; What changed: See the new   instructions.     CONTINUE taking these medications     acetaminophen 325 mg tablet; Commonly known as: Tylenol   Advair -21 mcg/actuation inhaler; Generic drug: fluticasone   propion-salmeteroL   amitriptyline 100 mg tablet; Commonly known as: Elavil; Take 1 tablet   (100 mg) by mouth once daily at bedtime.   azelastine 137 mcg (0.1 %) nasal spray; Commonly known as: Astelin   Baqsimi 3 mg/actuation spray,non-aerosol; Generic drug: glucagon; USE   ONE SPRAY IN THE NOSE AS NEEDED FOR LOW BLOOD SUGAR  MAY REPEAT AFTER 15   MINUTES USING A NEW DEVICE IF NO RESPONSE   BD Ultra-Fine Mini Pen Needle 31 gauge x 3/16\" needle; Generic drug: pen   needle, diabetic   carboxymethylcellulose 1 % ophthalmic solution dropperette; Commonly   known as: Refresh Celluvisc   cholecalciferol 5,000 Units tablet; Commonly known as: Vitamin D-3   Dexcom G7  misc; Generic drug: blood-glucose meter,continuous   DEXCOM G7 SENSOR MISC   diphenhydrAMINE 12.5 mg/5 mL liquid; Commonly known as: BENADryl   disposable gloves misc; Commonly known as: Disposable Latex-Free Gloves;   Use as needed   divalproex 500 mg 24 hr tablet; Commonly " known as: Depakote ER   EPINEPHrine 0.3 mg/0.3 mL injection syringe; Commonly known as: Epipen;   INJECT INTRAMUSCULARLY ONCE AS NEEDED FOR ANAPHYLAXIS   ezetimibe 10 mg tablet; Commonly known as: Zetia; Take 1 tablet (10 mg)   by mouth once daily.   folic acid 1 mg tablet; Commonly known as: Folvite; Take 1 tablet (1 mg)   by mouth once daily.   furosemide 20 mg tablet; Commonly known as: Lasix; Take 1 tablet (20 mg)   by mouth 2 times a day.   gloves, latex with aloe vera misc; Large size gloves   immune globulin (human) infusion; Commonly known as: Gammagard   insulin glargine 300 unit/mL (3 mL) injection; Commonly known as: Toujeo   Max Solostar- 2 unit dial; Inject 52 Units under the skin once daily in   the morning. Inject 50 units under skin once daily   insulin lispro 100 unit/mL injection; Commonly known as: HumaLOG KwikPen   Insulin; Use as directed to give up to 100 units a day   ipratropium-albuteroL 0.5-2.5 mg/3 mL nebulizer solution; Commonly known   as: Duo-Neb   KlonoPIN 0.5 mg tablet; Generic drug: clonazePAM   * lacosamide 200 mg tablet tablet; Commonly known as: Vimpat   * lacosamide 50 mg tablet; Commonly known as: Vimpat   levETIRAcetam 750 mg tablet; Commonly known as: Keppra   levothyroxine 50 mcg tablet; Commonly known as: Synthroid, Levoxyl; Take   1.5 tablets (75 mcg) by mouth once daily in the morning. Take before   meals.   * miconazole 2 % powder; Commonly known as: Micotin   * miconazole 2 % cream; Commonly known as: Micotin   miscellaneous medical supply misc; Bath Wipes  fragrance free   moisturizing mouth solution; Commonly known as: Biotene Oral Dry Mouth   montelukast 10 mg tablet; Commonly known as: Singulair; Take 1 tablet   (10 mg) by mouth once daily at bedtime.   Motegrity 2 mg tablet; Generic drug: prucalopride   nasal spray Nayzilam 5 mg/spray (0.1 mL) spray,non-aerosol; Generic   drug: midazolam   ondansetron ODT 4 mg disintegrating tablet; Commonly known as:   Zofran-ODT;  Take 1 tablet (4 mg) by mouth every 8 hours if needed for   nausea or vomiting.   OneTouch Delica Plus Lancet 33 gauge misc; Generic drug: lancets   OneTouch Verio test strips strip; Generic drug: blood sugar diagnostic   polyethylene glycol 17 gram/dose powder; Commonly known as: Glycolax,   Miralax   promethazine 12.5 mg tablet; Commonly known as: Phenergan   propranolol LA 60 mg 24 hr capsule; Commonly known as: Inderal LA; Take   1 capsule (60 mg) by mouth once daily. Do not crush, chew, or split.   Protonix 40 mg EC tablet; Generic drug: pantoprazole   Reyvow 100 mg tablet; Generic drug: lasmiditan; Take 100 mg by mouth if   needed (migraine). Do not drive in 8 hours of taking this medicine.   Maximum one dose per 24 hours.   rOPINIRole 0.5 mg tablet; Commonly known as: Requip   senna 8.6 mg tablet; Generic drug: sennosides   tiZANidine 2 mg tablet; Commonly known as: Zanaflex   Ubrelvy 100 mg tablet tablet; Generic drug: ubrogepant; Take 1 tablet   (100 mg) by mouth if needed (migraine). May repeat in 2 hours for max of   200mg per 24 hours.   Viactiv 650 mg-12.5 mcg-40 mcg chewable tablet; Generic drug:   calcium-vitamin D3-vitamin K   ZINC ORAL  * This list has 4 medication(s) that are the same as other medications   prescribed for you. Read the directions carefully, and ask your doctor or   other care provider to review them with you.     STOP taking these medications     oxyCODONE 5 mg immediate release tablet; Commonly known as: Roxicodone     Outpatient Follow-Up  Future Appointments   Date Time Provider Department [place]   11/13/2024 10:00 AM NEUROSURGERY [facility] NURSE [id number] [facility]   12/5/2024  3:45 PM Rob Mederos MD PhD [id number] [facility]   12/16/2024  2:30 PM Avelino Tafoya MD [id number] [facility]   12/20/2024 11:30 AM Loyda FitzpatrickD [id number] [facility]   1/7/2025  4:00 PM Isidoro Mensah DO [id number] [facility]   4/3/2025 12:00 PM Marah Felix MD [id number] [facility]  [place]    Total face to face time spent with patient/family of >30 minutes, with >50% of the time spent discussing plan of care/management, counseling/educating on disease processes, explaining results of diagnostic testing.

## 2024-11-08 NOTE — PROGRESS NOTES
"Jonathan Ayala is a 48 y.o. female on day 1 of admission presenting with Wound dehiscence.    Subjective   NAEON       Objective     Physical Exam  Aox3  Fcx4 5/5  Vision stable  No incisional leakage    Last Recorded Vitals  Blood pressure 99/64, pulse 92, temperature 36.4 °C (97.5 °F), resp. rate 18, height 1.575 m (5' 2\"), weight 113 kg (250 lb), SpO2 96%.  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 Mederos and found to 10 days of total right eye blindness, SS intact, 10/23 MRI brain/orbit neg, 10/25 s/p LP (OP28, CP9), 10/29 p/w HA and visual changes, 10/30 s/p R VPS exploration w abdominal catheter repositioning w improvement in distal runnoff, 11/6 p/w min clear incisional drainage, min decr in vision     -floor  -OR if CSF cx positive, if remains neg through today can plan for dispo tomorrow with 1 week follow up  -ophtho recs - daily exams  PTOT  SCD, SQH           Albert Cresop MD      "

## 2024-11-10 LAB
BACTERIA CSF CULT: NORMAL
GRAM STN SPEC: NORMAL
GRAM STN SPEC: NORMAL

## 2024-11-11 ENCOUNTER — PATIENT OUTREACH (OUTPATIENT)
Dept: PRIMARY CARE | Facility: CLINIC | Age: 49
End: 2024-11-11
Payer: MEDICAID

## 2024-11-11 ENCOUNTER — SPECIALTY PHARMACY (OUTPATIENT)
Dept: PHARMACY | Facility: CLINIC | Age: 49
End: 2024-11-11

## 2024-11-11 ENCOUNTER — TELEPHONE (OUTPATIENT)
Dept: PRIMARY CARE | Facility: CLINIC | Age: 49
End: 2024-11-11
Payer: MEDICAID

## 2024-11-11 PROCEDURE — RXMED WILLOW AMBULATORY MEDICATION CHARGE

## 2024-11-11 NOTE — PROGRESS NOTES
Discharge Facility: [facility]  Discharge Diagnosis:  Superficial disruption of operation wound  Admission Date: 6 Nov 24  Discharge Date: 8 Nov 24    PCP Appointment Date: Tasked to office  Specialist Appointment Date: 12 Nov 24 (NeurologyChika @ [facility])  Hospital Encounter and Summary Linked: Yes    No contact on discharge outreach after 2 attempts. Tasked to office for scheduling. Will attempt outreach again in 2 wks.

## 2024-11-11 NOTE — TELEPHONE ENCOUNTER
----- Message from Forest Fuentes sent at 11/11/2024  2:59 PM EST -----  Regarding: Unsuccessful discharge outreach after 2 attempts.  Discharge facility: [facility]  Discharge diagnosis:  Superficial disruption of operation wound     Date of discharge: 8 Nov 24       Unsuccessful attempts x2 to reach patient for PCP Follow-up  Please have office staff reach out to patient and schedule an appointment within 14 days from discharge date.

## 2024-11-12 ENCOUNTER — PHARMACY VISIT (OUTPATIENT)
Dept: PHARMACY | Facility: CLINIC | Age: 49
End: 2024-11-12
Payer: MEDICAID

## 2024-11-13 ENCOUNTER — CLINICAL SUPPORT (OUTPATIENT)
Dept: NEUROSURGERY | Facility: HOSPITAL | Age: 49
End: 2024-11-13
Payer: MEDICAID

## 2024-11-13 NOTE — PROGRESS NOTES
Crani incision well approximated with out redness, swelling or drainage. The incision had additional sutures and she will come back next week and then I will remove them. She will see Dr. Tafoya on 12/16.

## 2024-11-14 LAB
BACTERIA CSF CULT: NORMAL
GRAM STN SPEC: NORMAL
GRAM STN SPEC: NORMAL

## 2024-11-15 ENCOUNTER — APPOINTMENT (OUTPATIENT)
Dept: RADIOLOGY | Facility: HOSPITAL | Age: 49
End: 2024-11-15
Payer: MEDICAID

## 2024-11-15 ENCOUNTER — ANESTHESIA (OUTPATIENT)
Dept: OPERATING ROOM | Facility: HOSPITAL | Age: 49
End: 2024-11-15
Payer: MEDICAID

## 2024-11-15 ENCOUNTER — ANESTHESIA EVENT (OUTPATIENT)
Dept: OPERATING ROOM | Facility: HOSPITAL | Age: 49
End: 2024-11-15
Payer: MEDICAID

## 2024-11-15 ENCOUNTER — CLINICAL SUPPORT (OUTPATIENT)
Dept: EMERGENCY MEDICINE | Facility: HOSPITAL | Age: 49
End: 2024-11-15
Payer: MEDICAID

## 2024-11-15 ENCOUNTER — HOSPITAL ENCOUNTER (INPATIENT)
Facility: HOSPITAL | Age: 49
LOS: 1 days | Discharge: HOME | End: 2024-11-16
Attending: EMERGENCY MEDICINE | Admitting: NEUROLOGICAL SURGERY
Payer: MEDICAID

## 2024-11-15 DIAGNOSIS — G89.18 ACUTE POST-OPERATIVE PAIN: ICD-10-CM

## 2024-11-15 DIAGNOSIS — E11.9 TYPE 2 DIABETES MELLITUS WITHOUT COMPLICATION, UNSPECIFIED WHETHER LONG TERM INSULIN USE (MULTI): ICD-10-CM

## 2024-11-15 DIAGNOSIS — T81.31XA POSTOPERATIVE WOUND DEHISCENCE, INITIAL ENCOUNTER: Primary | ICD-10-CM

## 2024-11-15 LAB
ABO GROUP (TYPE) IN BLOOD: NORMAL
ALBUMIN SERPL BCP-MCNC: 3.8 G/DL (ref 3.4–5)
ALP SERPL-CCNC: 105 U/L (ref 33–110)
ALT SERPL W P-5'-P-CCNC: 24 U/L (ref 7–45)
ANION GAP SERPL CALC-SCNC: 16 MMOL/L (ref 10–20)
ANTIBODY SCREEN: NORMAL
APPEARANCE CSF: CLEAR
APPEARANCE UR: CLEAR
APTT PPP: 42 SECONDS (ref 27–38)
AST SERPL W P-5'-P-CCNC: 14 U/L (ref 9–39)
BASOPHILS # BLD AUTO: 0.03 X10*3/UL (ref 0–0.1)
BASOPHILS NFR BLD AUTO: 0.6 %
BASOPHILS NFR CSF MANUAL: 0 %
BILIRUB SERPL-MCNC: 0.3 MG/DL (ref 0–1.2)
BILIRUB UR STRIP.AUTO-MCNC: NEGATIVE MG/DL
BLASTS CSF MANUAL: 0 %
BUN SERPL-MCNC: 15 MG/DL (ref 6–23)
CALCIUM SERPL-MCNC: 9.1 MG/DL (ref 8.6–10.6)
CHLORIDE SERPL-SCNC: 103 MMOL/L (ref 98–107)
CO2 SERPL-SCNC: 27 MMOL/L (ref 21–32)
COLOR CSF: COLORLESS
COLOR SPUN CSF: COLORLESS
COLOR UR: NORMAL
CREAT SERPL-MCNC: 0.93 MG/DL (ref 0.5–1.05)
CRP SERPL-MCNC: 1.19 MG/DL
EGFRCR SERPLBLD CKD-EPI 2021: 77 ML/MIN/1.73M*2
EOSINOPHIL # BLD AUTO: 0.18 X10*3/UL (ref 0–0.7)
EOSINOPHIL NFR BLD AUTO: 3.4 %
EOSINOPHIL NFR CSF MANUAL: 0 %
ERYTHROCYTE [DISTWIDTH] IN BLOOD BY AUTOMATED COUNT: 15.1 % (ref 11.5–14.5)
ERYTHROCYTE [SEDIMENTATION RATE] IN BLOOD BY WESTERGREN METHOD: 21 MM/H (ref 0–20)
GLUCOSE BLD MANUAL STRIP-MCNC: 175 MG/DL (ref 74–99)
GLUCOSE BLD MANUAL STRIP-MCNC: 179 MG/DL (ref 74–99)
GLUCOSE BLD MANUAL STRIP-MCNC: 228 MG/DL (ref 74–99)
GLUCOSE CSF-MCNC: 92 MG/DL (ref 40–70)
GLUCOSE SERPL-MCNC: 198 MG/DL (ref 74–99)
GLUCOSE UR STRIP.AUTO-MCNC: NORMAL MG/DL
HCT VFR BLD AUTO: 34.2 % (ref 36–46)
HGB BLD-MCNC: 11.2 G/DL (ref 12–16)
HOLD SPECIMEN: NORMAL
IMM GRANULOCYTES # BLD AUTO: 0.02 X10*3/UL (ref 0–0.7)
IMM GRANULOCYTES NFR BLD AUTO: 0.4 % (ref 0–0.9)
IMM GRANULOCYTES NFR CSF: 0 %
INR PPP: 1 (ref 0.9–1.1)
KETONES UR STRIP.AUTO-MCNC: NEGATIVE MG/DL
LEUKOCYTE ESTERASE UR QL STRIP.AUTO: NEGATIVE
LYMPHOCYTES # BLD AUTO: 1.69 X10*3/UL (ref 1.2–4.8)
LYMPHOCYTES NFR BLD AUTO: 32.1 %
LYMPHOCYTES NFR CSF MANUAL: 100 % (ref 28–96)
MCH RBC QN AUTO: 28.4 PG (ref 26–34)
MCHC RBC AUTO-ENTMCNC: 32.7 G/DL (ref 32–36)
MCV RBC AUTO: 87 FL (ref 80–100)
MONOCYTES # BLD AUTO: 0.36 X10*3/UL (ref 0.1–1)
MONOCYTES NFR BLD AUTO: 6.8 %
MONOS+MACROS NFR CSF MANUAL: 0 % (ref 16–56)
NEUTROPHILS # BLD AUTO: 2.98 X10*3/UL (ref 1.2–7.7)
NEUTROPHILS NFR BLD AUTO: 56.7 %
NEUTS SEG NFR CSF MANUAL: 0 % (ref 0–5)
NITRITE UR QL STRIP.AUTO: NEGATIVE
NRBC BLD-RTO: 0 /100 WBCS (ref 0–0)
OTHER CELLS NFR CSF MANUAL: 0 %
PH UR STRIP.AUTO: 7.5 [PH]
PLASMA CELLS NFR CSF MICRO: 0 %
PLATELET # BLD AUTO: 313 X10*3/UL (ref 150–450)
POTASSIUM SERPL-SCNC: 3.7 MMOL/L (ref 3.5–5.3)
PROT CSF-MCNC: 69 MG/DL (ref 15–45)
PROT SERPL-MCNC: 6.6 G/DL (ref 6.4–8.2)
PROT UR STRIP.AUTO-MCNC: NEGATIVE MG/DL
PROTHROMBIN TIME: 11.5 SECONDS (ref 9.8–12.8)
RBC # BLD AUTO: 3.95 X10*6/UL (ref 4–5.2)
RBC # CSF AUTO: 3 /UL (ref 0–5)
RBC # UR STRIP.AUTO: NEGATIVE /UL
RH FACTOR (ANTIGEN D): NORMAL
SODIUM SERPL-SCNC: 142 MMOL/L (ref 136–145)
SP GR UR STRIP.AUTO: 1.01
TOTAL CELLS COUNTED CSF: 6
TUBE # CSF: NORMAL
UROBILINOGEN UR STRIP.AUTO-MCNC: NORMAL MG/DL
WBC # BLD AUTO: 5.3 X10*3/UL (ref 4.4–11.3)
WBC # CSF AUTO: 1 /UL (ref 1–5)

## 2024-11-15 PROCEDURE — 85610 PROTHROMBIN TIME: CPT

## 2024-11-15 PROCEDURE — 3600000009 HC OR TIME - EACH INCREMENTAL 1 MINUTE - PROCEDURE LEVEL FOUR: Performed by: NEUROLOGICAL SURGERY

## 2024-11-15 PROCEDURE — 70450 CT HEAD/BRAIN W/O DYE: CPT

## 2024-11-15 PROCEDURE — 2500000004 HC RX 250 GENERAL PHARMACY W/ HCPCS (ALT 636 FOR OP/ED): Mod: SE | Performed by: STUDENT IN AN ORGANIZED HEALTH CARE EDUCATION/TRAINING PROGRAM

## 2024-11-15 PROCEDURE — 00W60JZ REVISION OF SYNTHETIC SUBSTITUTE IN CEREBRAL VENTRICLE, OPEN APPROACH: ICD-10-PCS | Performed by: NEUROLOGICAL SURGERY

## 2024-11-15 PROCEDURE — 82947 ASSAY GLUCOSE BLOOD QUANT: CPT

## 2024-11-15 PROCEDURE — 85025 COMPLETE CBC W/AUTO DIFF WBC: CPT

## 2024-11-15 PROCEDURE — 84157 ASSAY OF PROTEIN OTHER: CPT

## 2024-11-15 PROCEDURE — 99285 EMERGENCY DEPT VISIT HI MDM: CPT | Mod: 25

## 2024-11-15 PROCEDURE — 2500000002 HC RX 250 W HCPCS SELF ADMINISTERED DRUGS (ALT 637 FOR MEDICARE OP, ALT 636 FOR OP/ED): Performed by: STUDENT IN AN ORGANIZED HEALTH CARE EDUCATION/TRAINING PROGRAM

## 2024-11-15 PROCEDURE — 2500000001 HC RX 250 WO HCPCS SELF ADMINISTERED DRUGS (ALT 637 FOR MEDICARE OP): Performed by: STUDENT IN AN ORGANIZED HEALTH CARE EDUCATION/TRAINING PROGRAM

## 2024-11-15 PROCEDURE — 2500000005 HC RX 250 GENERAL PHARMACY W/O HCPCS: Mod: SE | Performed by: NEUROLOGICAL SURGERY

## 2024-11-15 PROCEDURE — 86900 BLOOD TYPING SEROLOGIC ABO: CPT

## 2024-11-15 PROCEDURE — C1894 INTRO/SHEATH, NON-LASER: HCPCS | Performed by: NEUROLOGICAL SURGERY

## 2024-11-15 PROCEDURE — 81003 URINALYSIS AUTO W/O SCOPE: CPT

## 2024-11-15 PROCEDURE — 87205 SMEAR GRAM STAIN: CPT

## 2024-11-15 PROCEDURE — 2500000002 HC RX 250 W HCPCS SELF ADMINISTERED DRUGS (ALT 637 FOR MEDICARE OP, ALT 636 FOR OP/ED)

## 2024-11-15 PROCEDURE — 2720000007 HC OR 272 NO HCPCS: Performed by: NEUROLOGICAL SURGERY

## 2024-11-15 PROCEDURE — G0378 HOSPITAL OBSERVATION PER HR: HCPCS

## 2024-11-15 PROCEDURE — 2500000005 HC RX 250 GENERAL PHARMACY W/O HCPCS: Mod: SE

## 2024-11-15 PROCEDURE — 3700000002 HC GENERAL ANESTHESIA TIME - EACH INCREMENTAL 1 MINUTE: Performed by: NEUROLOGICAL SURGERY

## 2024-11-15 PROCEDURE — 2500000001 HC RX 250 WO HCPCS SELF ADMINISTERED DRUGS (ALT 637 FOR MEDICARE OP): Mod: SE

## 2024-11-15 PROCEDURE — 61020 REMOVE BRAIN CAVITY FLUID: CPT

## 2024-11-15 PROCEDURE — 3600000004 HC OR TIME - INITIAL BASE CHARGE - PROCEDURE LEVEL FOUR: Performed by: NEUROLOGICAL SURGERY

## 2024-11-15 PROCEDURE — 87070 CULTURE OTHR SPECIMN AEROBIC: CPT

## 2024-11-15 PROCEDURE — 99222 1ST HOSP IP/OBS MODERATE 55: CPT

## 2024-11-15 PROCEDURE — 99285 EMERGENCY DEPT VISIT HI MDM: CPT | Performed by: EMERGENCY MEDICINE

## 2024-11-15 PROCEDURE — 85652 RBC SED RATE AUTOMATED: CPT

## 2024-11-15 PROCEDURE — 2500000004 HC RX 250 GENERAL PHARMACY W/ HCPCS (ALT 636 FOR OP/ED): Mod: SE

## 2024-11-15 PROCEDURE — 89051 BODY FLUID CELL COUNT: CPT

## 2024-11-15 PROCEDURE — 2500000004 HC RX 250 GENERAL PHARMACY W/ HCPCS (ALT 636 FOR OP/ED): Mod: SE | Performed by: ANESTHESIOLOGIST ASSISTANT

## 2024-11-15 PROCEDURE — 93005 ELECTROCARDIOGRAM TRACING: CPT

## 2024-11-15 PROCEDURE — 2500000005 HC RX 250 GENERAL PHARMACY W/O HCPCS: Mod: SE | Performed by: ANESTHESIOLOGIST ASSISTANT

## 2024-11-15 PROCEDURE — 80053 COMPREHEN METABOLIC PANEL: CPT

## 2024-11-15 PROCEDURE — 70450 CT HEAD/BRAIN W/O DYE: CPT | Performed by: RADIOLOGY

## 2024-11-15 PROCEDURE — 7100000002 HC RECOVERY ROOM TIME - EACH INCREMENTAL 1 MINUTE: Performed by: NEUROLOGICAL SURGERY

## 2024-11-15 PROCEDURE — C1889 IMPLANT/INSERT DEVICE, NOC: HCPCS | Performed by: NEUROLOGICAL SURGERY

## 2024-11-15 PROCEDURE — 1100000001 HC PRIVATE ROOM DAILY

## 2024-11-15 PROCEDURE — 86140 C-REACTIVE PROTEIN: CPT

## 2024-11-15 PROCEDURE — 71045 X-RAY EXAM CHEST 1 VIEW: CPT

## 2024-11-15 PROCEDURE — 86901 BLOOD TYPING SEROLOGIC RH(D): CPT

## 2024-11-15 PROCEDURE — 96374 THER/PROPH/DIAG INJ IV PUSH: CPT

## 2024-11-15 PROCEDURE — 2500000002 HC RX 250 W HCPCS SELF ADMINISTERED DRUGS (ALT 637 FOR MEDICARE OP, ALT 636 FOR OP/ED): Mod: SE | Performed by: STUDENT IN AN ORGANIZED HEALTH CARE EDUCATION/TRAINING PROGRAM

## 2024-11-15 PROCEDURE — 2500000004 HC RX 250 GENERAL PHARMACY W/ HCPCS (ALT 636 FOR OP/ED): Mod: SE | Performed by: NEUROLOGICAL SURGERY

## 2024-11-15 PROCEDURE — 82945 GLUCOSE OTHER FLUID: CPT

## 2024-11-15 PROCEDURE — 2500000001 HC RX 250 WO HCPCS SELF ADMINISTERED DRUGS (ALT 637 FOR MEDICARE OP)

## 2024-11-15 PROCEDURE — 2780000003 HC OR 278 NO HCPCS: Performed by: NEUROLOGICAL SURGERY

## 2024-11-15 PROCEDURE — 96375 TX/PRO/DX INJ NEW DRUG ADDON: CPT

## 2024-11-15 PROCEDURE — 62230 REPLACE/REVISE BRAIN SHUNT: CPT | Performed by: NEUROLOGICAL SURGERY

## 2024-11-15 PROCEDURE — 86923 COMPATIBILITY TEST ELECTRIC: CPT

## 2024-11-15 PROCEDURE — 3700000001 HC GENERAL ANESTHESIA TIME - INITIAL BASE CHARGE: Performed by: NEUROLOGICAL SURGERY

## 2024-11-15 PROCEDURE — 7100000001 HC RECOVERY ROOM TIME - INITIAL BASE CHARGE: Performed by: NEUROLOGICAL SURGERY

## 2024-11-15 DEVICE — VALVE, CERTAS PLUS, W/ SIPHONGUARD: Type: IMPLANTABLE DEVICE | Site: BRAIN | Status: FUNCTIONAL

## 2024-11-15 RX ORDER — VASOPRESSIN 20 U/ML
INJECTION PARENTERAL AS NEEDED
Status: DISCONTINUED | OUTPATIENT
Start: 2024-11-15 | End: 2024-11-15

## 2024-11-15 RX ORDER — DROPERIDOL 2.5 MG/ML
0.62 INJECTION, SOLUTION INTRAMUSCULAR; INTRAVENOUS ONCE AS NEEDED
Status: COMPLETED | OUTPATIENT
Start: 2024-11-15 | End: 2024-11-15

## 2024-11-15 RX ORDER — CEFAZOLIN 1 G/1
INJECTION, POWDER, FOR SOLUTION INTRAVENOUS AS NEEDED
Status: DISCONTINUED | OUTPATIENT
Start: 2024-11-15 | End: 2024-11-15

## 2024-11-15 RX ORDER — DIPHENHYDRAMINE HYDROCHLORIDE 50 MG/ML
25 INJECTION INTRAMUSCULAR; INTRAVENOUS ONCE
Status: COMPLETED | OUTPATIENT
Start: 2024-11-15 | End: 2024-11-15

## 2024-11-15 RX ORDER — PROPRANOLOL HYDROCHLORIDE 60 MG/1
60 CAPSULE, EXTENDED RELEASE ORAL DAILY
Status: DISCONTINUED | OUTPATIENT
Start: 2024-11-15 | End: 2024-11-16 | Stop reason: HOSPADM

## 2024-11-15 RX ORDER — ONDANSETRON 4 MG/1
4 TABLET, ORALLY DISINTEGRATING ORAL EVERY 8 HOURS PRN
Status: DISCONTINUED | OUTPATIENT
Start: 2024-11-15 | End: 2024-11-16 | Stop reason: HOSPADM

## 2024-11-15 RX ORDER — LIDOCAINE HYDROCHLORIDE 20 MG/ML
INJECTION, SOLUTION INFILTRATION; PERINEURAL AS NEEDED
Status: DISCONTINUED | OUTPATIENT
Start: 2024-11-15 | End: 2024-11-15

## 2024-11-15 RX ORDER — LIDOCAINE HYDROCHLORIDE AND EPINEPHRINE 5; 5 MG/ML; UG/ML
INJECTION, SOLUTION INFILTRATION; PERINEURAL AS NEEDED
Status: DISCONTINUED | OUTPATIENT
Start: 2024-11-15 | End: 2024-11-15 | Stop reason: HOSPADM

## 2024-11-15 RX ORDER — ESMOLOL HYDROCHLORIDE 10 MG/ML
INJECTION INTRAVENOUS AS NEEDED
Status: DISCONTINUED | OUTPATIENT
Start: 2024-11-15 | End: 2024-11-15

## 2024-11-15 RX ORDER — PANTOPRAZOLE SODIUM 40 MG/1
40 TABLET, DELAYED RELEASE ORAL
Status: DISCONTINUED | OUTPATIENT
Start: 2024-11-15 | End: 2024-11-16 | Stop reason: HOSPADM

## 2024-11-15 RX ORDER — OXYCODONE HYDROCHLORIDE 10 MG/1
10 TABLET ORAL EVERY 6 HOURS PRN
Status: DISCONTINUED | OUTPATIENT
Start: 2024-11-15 | End: 2024-11-16 | Stop reason: HOSPADM

## 2024-11-15 RX ORDER — FENTANYL CITRATE 50 UG/ML
25 INJECTION, SOLUTION INTRAMUSCULAR; INTRAVENOUS EVERY 5 MIN PRN
Status: DISCONTINUED | OUTPATIENT
Start: 2024-11-15 | End: 2024-11-15 | Stop reason: HOSPADM

## 2024-11-15 RX ORDER — GLYCOPYRROLATE 0.2 MG/ML
INJECTION INTRAMUSCULAR; INTRAVENOUS AS NEEDED
Status: DISCONTINUED | OUTPATIENT
Start: 2024-11-15 | End: 2024-11-15

## 2024-11-15 RX ORDER — FLUTICASONE PROPIONATE 50 MCG
1 SPRAY, SUSPENSION (ML) NASAL 2 TIMES DAILY
Status: DISCONTINUED | OUTPATIENT
Start: 2024-11-15 | End: 2024-11-16 | Stop reason: HOSPADM

## 2024-11-15 RX ORDER — FENTANYL CITRATE 50 UG/ML
INJECTION, SOLUTION INTRAMUSCULAR; INTRAVENOUS AS NEEDED
Status: DISCONTINUED | OUTPATIENT
Start: 2024-11-15 | End: 2024-11-15

## 2024-11-15 RX ORDER — OXYCODONE HYDROCHLORIDE 5 MG/1
2.5 TABLET ORAL EVERY 6 HOURS PRN
Status: DISCONTINUED | OUTPATIENT
Start: 2024-11-15 | End: 2024-11-16 | Stop reason: HOSPADM

## 2024-11-15 RX ORDER — EZETIMIBE 10 MG/1
10 TABLET ORAL DAILY
Status: DISCONTINUED | OUTPATIENT
Start: 2024-11-15 | End: 2024-11-16 | Stop reason: HOSPADM

## 2024-11-15 RX ORDER — SODIUM CHLORIDE 0.9 G/100ML
IRRIGANT IRRIGATION AS NEEDED
Status: DISCONTINUED | OUTPATIENT
Start: 2024-11-15 | End: 2024-11-15 | Stop reason: HOSPADM

## 2024-11-15 RX ORDER — FENTANYL CITRATE 50 UG/ML
12.5 INJECTION, SOLUTION INTRAMUSCULAR; INTRAVENOUS EVERY 5 MIN PRN
Status: DISCONTINUED | OUTPATIENT
Start: 2024-11-15 | End: 2024-11-15 | Stop reason: HOSPADM

## 2024-11-15 RX ORDER — LEVOTHYROXINE SODIUM 75 UG/1
75 TABLET ORAL
Status: DISCONTINUED | OUTPATIENT
Start: 2024-11-16 | End: 2024-11-16 | Stop reason: HOSPADM

## 2024-11-15 RX ORDER — AMITRIPTYLINE HYDROCHLORIDE 50 MG/1
100 TABLET, FILM COATED ORAL NIGHTLY
Status: DISCONTINUED | OUTPATIENT
Start: 2024-11-15 | End: 2024-11-16 | Stop reason: HOSPADM

## 2024-11-15 RX ORDER — PROPOFOL 10 MG/ML
INJECTION, EMULSION INTRAVENOUS AS NEEDED
Status: DISCONTINUED | OUTPATIENT
Start: 2024-11-15 | End: 2024-11-15

## 2024-11-15 RX ORDER — ROPINIROLE 1 MG/1
1 TABLET, FILM COATED ORAL NIGHTLY
Status: DISCONTINUED | OUTPATIENT
Start: 2024-11-15 | End: 2024-11-16 | Stop reason: HOSPADM

## 2024-11-15 RX ORDER — METOPROLOL TARTRATE 1 MG/ML
INJECTION, SOLUTION INTRAVENOUS AS NEEDED
Status: DISCONTINUED | OUTPATIENT
Start: 2024-11-15 | End: 2024-11-15

## 2024-11-15 RX ORDER — ROPINIROLE 0.5 MG/1
0.5 TABLET, FILM COATED ORAL EVERY MORNING
Status: DISCONTINUED | OUTPATIENT
Start: 2024-11-15 | End: 2024-11-16 | Stop reason: HOSPADM

## 2024-11-15 RX ORDER — ALBUTEROL SULFATE 0.83 MG/ML
2.5 SOLUTION RESPIRATORY (INHALATION) ONCE AS NEEDED
Status: COMPLETED | OUTPATIENT
Start: 2024-11-15 | End: 2024-11-15

## 2024-11-15 RX ORDER — SODIUM CHLORIDE, SODIUM LACTATE, POTASSIUM CHLORIDE, CALCIUM CHLORIDE 600; 310; 30; 20 MG/100ML; MG/100ML; MG/100ML; MG/100ML
INJECTION, SOLUTION INTRAVENOUS CONTINUOUS PRN
Status: DISCONTINUED | OUTPATIENT
Start: 2024-11-15 | End: 2024-11-15

## 2024-11-15 RX ORDER — FOLIC ACID 1 MG/1
1 TABLET ORAL DAILY
Status: DISCONTINUED | OUTPATIENT
Start: 2024-11-15 | End: 2024-11-16 | Stop reason: HOSPADM

## 2024-11-15 RX ORDER — ACETAMINOPHEN 325 MG/1
650 TABLET ORAL EVERY 6 HOURS PRN
Status: DISCONTINUED | OUTPATIENT
Start: 2024-11-15 | End: 2024-11-16 | Stop reason: HOSPADM

## 2024-11-15 RX ORDER — ONDANSETRON HYDROCHLORIDE 2 MG/ML
4 INJECTION, SOLUTION INTRAVENOUS ONCE AS NEEDED
Status: COMPLETED | OUTPATIENT
Start: 2024-11-15 | End: 2024-11-15

## 2024-11-15 RX ORDER — ONDANSETRON HYDROCHLORIDE 2 MG/ML
INJECTION, SOLUTION INTRAVENOUS AS NEEDED
Status: DISCONTINUED | OUTPATIENT
Start: 2024-11-15 | End: 2024-11-15

## 2024-11-15 RX ORDER — LACOSAMIDE 100 MG/1
50 TABLET ORAL EVERY 12 HOURS
Status: DISCONTINUED | OUTPATIENT
Start: 2024-11-15 | End: 2024-11-15

## 2024-11-15 RX ORDER — LACOSAMIDE 100 MG/1
200 TABLET ORAL 2 TIMES DAILY
Status: DISCONTINUED | OUTPATIENT
Start: 2024-11-15 | End: 2024-11-15

## 2024-11-15 RX ORDER — LEVETIRACETAM 250 MG/1
1500 TABLET ORAL 2 TIMES DAILY
Status: DISCONTINUED | OUTPATIENT
Start: 2024-11-15 | End: 2024-11-16 | Stop reason: HOSPADM

## 2024-11-15 RX ORDER — METOCLOPRAMIDE HYDROCHLORIDE 5 MG/ML
10 INJECTION INTRAMUSCULAR; INTRAVENOUS ONCE
Status: COMPLETED | OUTPATIENT
Start: 2024-11-15 | End: 2024-11-15

## 2024-11-15 RX ORDER — ONDANSETRON HYDROCHLORIDE 2 MG/ML
4 INJECTION, SOLUTION INTRAVENOUS EVERY 8 HOURS PRN
Status: DISCONTINUED | OUTPATIENT
Start: 2024-11-15 | End: 2024-11-16 | Stop reason: HOSPADM

## 2024-11-15 RX ORDER — DEXTROSE 50 % IN WATER (D50W) INTRAVENOUS SYRINGE
12.5
Status: DISCONTINUED | OUTPATIENT
Start: 2024-11-15 | End: 2024-11-16 | Stop reason: HOSPADM

## 2024-11-15 RX ORDER — DIVALPROEX SODIUM 500 MG/1
500 TABLET, FILM COATED, EXTENDED RELEASE ORAL 2 TIMES DAILY
Status: DISCONTINUED | OUTPATIENT
Start: 2024-11-15 | End: 2024-11-16 | Stop reason: HOSPADM

## 2024-11-15 RX ORDER — POLYMYXIN B 500000 [USP'U]/1
INJECTION, POWDER, LYOPHILIZED, FOR SOLUTION INTRAMUSCULAR; INTRATHECAL; INTRAVENOUS; OPHTHALMIC AS NEEDED
Status: DISCONTINUED | OUTPATIENT
Start: 2024-11-15 | End: 2024-11-15 | Stop reason: HOSPADM

## 2024-11-15 RX ORDER — OXYCODONE HYDROCHLORIDE 5 MG/1
5 TABLET ORAL EVERY 6 HOURS PRN
Status: DISCONTINUED | OUTPATIENT
Start: 2024-11-15 | End: 2024-11-16 | Stop reason: HOSPADM

## 2024-11-15 RX ORDER — FUROSEMIDE 20 MG/1
20 TABLET ORAL 2 TIMES DAILY
Status: DISCONTINUED | OUTPATIENT
Start: 2024-11-15 | End: 2024-11-16 | Stop reason: HOSPADM

## 2024-11-15 RX ORDER — ACETAMINOPHEN 325 MG/1
975 TABLET ORAL ONCE
Status: CANCELLED | OUTPATIENT
Start: 2024-11-15 | End: 2024-11-15

## 2024-11-15 RX ORDER — POLYETHYLENE GLYCOL 3350 17 G/17G
17 POWDER, FOR SOLUTION ORAL DAILY
Status: DISCONTINUED | OUTPATIENT
Start: 2024-11-15 | End: 2024-11-16 | Stop reason: HOSPADM

## 2024-11-15 RX ORDER — FLUTICASONE FUROATE AND VILANTEROL 200; 25 UG/1; UG/1
1 POWDER RESPIRATORY (INHALATION)
Status: DISCONTINUED | OUTPATIENT
Start: 2024-11-15 | End: 2024-11-16 | Stop reason: HOSPADM

## 2024-11-15 RX ORDER — POLYETHYLENE GLYCOL 3350 17 G/17G
17 POWDER, FOR SOLUTION ORAL DAILY PRN
Status: DISCONTINUED | OUTPATIENT
Start: 2024-11-15 | End: 2024-11-15 | Stop reason: SDUPTHER

## 2024-11-15 RX ORDER — DEXTROSE 50 % IN WATER (D50W) INTRAVENOUS SYRINGE
25
Status: DISCONTINUED | OUTPATIENT
Start: 2024-11-15 | End: 2024-11-16 | Stop reason: HOSPADM

## 2024-11-15 RX ORDER — PHENYLEPHRINE HCL IN 0.9% NACL 0.4MG/10ML
SYRINGE (ML) INTRAVENOUS AS NEEDED
Status: DISCONTINUED | OUTPATIENT
Start: 2024-11-15 | End: 2024-11-15

## 2024-11-15 RX ORDER — MIDAZOLAM HYDROCHLORIDE 1 MG/ML
INJECTION INTRAMUSCULAR; INTRAVENOUS AS NEEDED
Status: DISCONTINUED | OUTPATIENT
Start: 2024-11-15 | End: 2024-11-15

## 2024-11-15 RX ORDER — IPRATROPIUM BROMIDE AND ALBUTEROL SULFATE 2.5; .5 MG/3ML; MG/3ML
3 SOLUTION RESPIRATORY (INHALATION) 4 TIMES DAILY PRN
Status: DISCONTINUED | OUTPATIENT
Start: 2024-11-15 | End: 2024-11-16 | Stop reason: HOSPADM

## 2024-11-15 RX ADMIN — EZETIMIBE 10 MG: 10 TABLET ORAL at 10:59

## 2024-11-15 RX ADMIN — DROPERIDOL 0.62 MG: 2.5 INJECTION, SOLUTION INTRAMUSCULAR; INTRAVENOUS at 15:32

## 2024-11-15 RX ADMIN — LACOSAMIDE 250 MG: 100 TABLET, FILM COATED ORAL at 20:34

## 2024-11-15 RX ADMIN — OXYCODONE HYDROCHLORIDE 10 MG: 10 TABLET ORAL at 08:52

## 2024-11-15 RX ADMIN — FOLIC ACID 1 MG: 1 TABLET ORAL at 10:59

## 2024-11-15 RX ADMIN — ONDANSETRON 4 MG: 4 TABLET, ORALLY DISINTEGRATING ORAL at 08:52

## 2024-11-15 RX ADMIN — ONDANSETRON 4 MG: 2 INJECTION INTRAMUSCULAR; INTRAVENOUS at 15:07

## 2024-11-15 RX ADMIN — DIVALPROEX SODIUM 500 MG: 500 TABLET, FILM COATED, EXTENDED RELEASE ORAL at 10:58

## 2024-11-15 RX ADMIN — AMITRIPTYLINE HYDROCHLORIDE 100 MG: 100 TABLET ORAL at 20:34

## 2024-11-15 RX ADMIN — OXYCODONE HYDROCHLORIDE 10 MG: 10 TABLET ORAL at 20:00

## 2024-11-15 RX ADMIN — DIPHENHYDRAMINE HYDROCHLORIDE 25 MG: 50 INJECTION INTRAMUSCULAR; INTRAVENOUS at 05:58

## 2024-11-15 RX ADMIN — LACOSAMIDE 250 MG: 100 TABLET, FILM COATED ORAL at 11:14

## 2024-11-15 RX ADMIN — LEVETIRACETAM 1500 MG: 500 TABLET, FILM COATED ORAL at 21:37

## 2024-11-15 RX ADMIN — ROPINIROLE HYDROCHLORIDE 1 MG: 1 TABLET, FILM COATED ORAL at 21:36

## 2024-11-15 RX ADMIN — LEVETIRACETAM 1500 MG: 500 TABLET, FILM COATED ORAL at 10:58

## 2024-11-15 RX ADMIN — FLUTICASONE PROPIONATE 1 SPRAY: 50 SPRAY, METERED NASAL at 10:58

## 2024-11-15 RX ADMIN — METOCLOPRAMIDE 10 MG: 5 INJECTION, SOLUTION INTRAMUSCULAR; INTRAVENOUS at 05:58

## 2024-11-15 RX ADMIN — DIVALPROEX SODIUM 500 MG: 500 TABLET, FILM COATED, EXTENDED RELEASE ORAL at 20:34

## 2024-11-15 RX ADMIN — FLUTICASONE PROPIONATE 1 SPRAY: 50 SPRAY, METERED NASAL at 21:36

## 2024-11-15 ASSESSMENT — ENCOUNTER SYMPTOMS
DIZZINESS: 0
NECK STIFFNESS: 0
AGITATION: 0
CHEST TIGHTNESS: 0
SPEECH DIFFICULTY: 0
CHILLS: 0
SEIZURES: 0
LIGHT-HEADEDNESS: 0
FEVER: 0
HEADACHES: 1
SHORTNESS OF BREATH: 0
VOMITING: 0
CONFUSION: 0
WHEEZING: 0
NECK PAIN: 0
WOUND: 1
ABDOMINAL PAIN: 0
NAUSEA: 0
DIFFICULTY URINATING: 0
WEAKNESS: 0

## 2024-11-15 ASSESSMENT — PAIN - FUNCTIONAL ASSESSMENT
PAIN_FUNCTIONAL_ASSESSMENT: 0-10

## 2024-11-15 ASSESSMENT — COGNITIVE AND FUNCTIONAL STATUS - GENERAL
MOBILITY SCORE: 24
DAILY ACTIVITIY SCORE: 24

## 2024-11-15 ASSESSMENT — PAIN SCALES - GENERAL
PAINLEVEL_OUTOF10: 8
PAINLEVEL_OUTOF10: 5 - MODERATE PAIN
PAINLEVEL_OUTOF10: 7
PAINLEVEL_OUTOF10: 6
PAINLEVEL_OUTOF10: 7
PAINLEVEL_OUTOF10: 8
PAINLEVEL_OUTOF10: 8
PAINLEVEL_OUTOF10: 7
PAINLEVEL_OUTOF10: 8
PAINLEVEL_OUTOF10: 9

## 2024-11-15 ASSESSMENT — PAIN DESCRIPTION - LOCATION
LOCATION: HEAD
LOCATION: HEAD

## 2024-11-15 ASSESSMENT — COLUMBIA-SUICIDE SEVERITY RATING SCALE - C-SSRS
1. IN THE PAST MONTH, HAVE YOU WISHED YOU WERE DEAD OR WISHED YOU COULD GO TO SLEEP AND NOT WAKE UP?: NO
1. IN THE PAST MONTH, HAVE YOU WISHED YOU WERE DEAD OR WISHED YOU COULD GO TO SLEEP AND NOT WAKE UP?: NO
6. HAVE YOU EVER DONE ANYTHING, STARTED TO DO ANYTHING, OR PREPARED TO DO ANYTHING TO END YOUR LIFE?: NO
2. HAVE YOU ACTUALLY HAD ANY THOUGHTS OF KILLING YOURSELF?: NO
2. HAVE YOU ACTUALLY HAD ANY THOUGHTS OF KILLING YOURSELF?: NO
6. HAVE YOU EVER DONE ANYTHING, STARTED TO DO ANYTHING, OR PREPARED TO DO ANYTHING TO END YOUR LIFE?: NO

## 2024-11-15 ASSESSMENT — PAIN DESCRIPTION - PAIN TYPE: TYPE: ACUTE PAIN

## 2024-11-15 NOTE — H&P (VIEW-ONLY)
Department of Neurosurgery  History and Physical    Patient Name: Jonathan Ayala  MRN: [id number]  Date:  11/15/24     Subjective  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) s/p R frontal bolt c/b tract hemorrhage and focal epilepsy, right hemiplegic migraines, CVID on monthly IVIG infusions, Sjogren's syndrome/scleroderma, POTS, T2DM, HTN, hypothyroidism, BRENT on CPAP, anxiety/depression, 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 9/17 p/w 1 mo R eye blurry vision, MRI brain RF encephalomalaxcia, MR orbit L optic nerve STIR signal, MRV negative, 9/18 s/p LP (OP 52), 9/21 s/p BAT for L hemiplegia, CTH/CTA stable RF encephalomalacia, no LVO, MRI neg, 9/24/2024 s/p RF  shunt (certas at 5), 10/23 presenting after ophtho clinic with Dr. Mederos and found to 10 days of total right eye blindness, SS intact, 10/23 MRI brain/orbit neg, 10/25 s/p LP (OP28, CP9), 10/29 p/w HA and visual changes, 10/30 s/p R VPS exploration w abdominal catheter repositioning w improvement in distal runnoff, 11/6 p/w min clear incisional drainage, min decr in vision, incision oversewn with no further leakage noted.   11/15 she presented to the ED with fluid leaking from incision site and headache. She denies any CP/SOB, fever/chills or n/v/d.       Past Surgical History:   Procedure Laterality Date    APPENDECTOMY  2017    BRAIN SURGERY  [place] placement to measure pressure    CARDIAC CATHETERIZATION      CHOLECYSTECTOMY      ENDOMETRIAL ABLATION      FRACTURE SURGERY  12/2023    HERNIA REPAIR Right 03/01/2024    with mesh    HYSTERECTOMY  2017    MR HEAD ANGIO WO IV CONTRAST  02/08/2021    MR HEAD ANGIO WO IV CONTRAST 2/8/2021 [facility] CLINICAL LEGACY    MR NECK ANGIO WO IV CONTRAST  02/08/2021    MR NECK ANGIO WO IV CONTRAST 2/8/2021 [facility] CLINICAL LEGACY    OTHER SURGICAL HISTORY  08/22/2019    Carpal tunnel surgery    OTHER SURGICAL HISTORY  08/22/2019    Hysterectomy     "OTHER SURGICAL HISTORY  08/22/2019    Venous access port placement    OTHER SURGICAL HISTORY  08/22/2019    Cholecystectomy    OTHER SURGICAL HISTORY  08/22/2019    Appendectomy    OTHER SURGICAL HISTORY  08/22/2019    Pyloroplasty    WISDOM TOOTH EXTRACTION  2004         Social History     Tobacco Use    Smoking status: Never    Smokeless tobacco: Never   Vaping Use    Vaping status: Never Used   Substance Use Topics    Alcohol use: Not Currently     Comment: Socially in College    Drug use: Yes     Types: Benzodiazepines     Review of Systems   Constitutional:  Negative for chills and fever.   HENT:  Negative for tinnitus.    Eyes:         B/L vision loss   Respiratory:  Negative for chest tightness, shortness of breath and wheezing.    Cardiovascular:  Negative for chest pain and leg swelling.   Gastrointestinal:  Negative for abdominal pain, nausea and vomiting.   Genitourinary:  Negative for difficulty urinating.   Musculoskeletal:  Negative for neck pain and neck stiffness.   Skin:  Positive for wound.        Cranial incision with clear drainage   Neurological:  Positive for headaches. Negative for dizziness, seizures, speech difficulty, weakness and light-headedness.        BUE 5/5  BLE 5/5  AOx3   Psychiatric/Behavioral:  Negative for agitation and confusion.         Objective    Vital Signs  /87 (BP Location: Right arm, Patient Position: Lying)   Pulse (!) 104   Temp 37 °C (98.6 °F) (Temporal)   Resp 16   Ht 1.575 m (5' 2\")   Wt 93 kg (205 lb)   SpO2 97%   BMI 37.49 kg/m²      Physical Exam  Constitutional: A&Ox3, calm and cooperative, NAD.  Eyes: PERRL, clear sclera, impaired vision  ENMT: Moist mucous membranes, no apparent injuries or lesions.  Head/Neck: Neck supple, no JVD. NC/AT.   Cardiovascular: Normal rate and regular rhythm. 2+ equal pulses of the distal extremities.  Respiratory/Thorax: CTAB, regular respirations on RA. Good symmetric chest expansion.   Gastrointestinal: Abdomen " soft, non tender.   Genitourinary: Voiding independently.   Extremities: No peripheral edema. Moving all 4 extremities actively.   Neurological: A&Ox3, FC x4, 5/5x4  Psychological: Appropriate mood and behavior.   Skin: cranial incision with clear drainage     Diagnostics   Results for orders placed or performed during the hospital encounter of 11/15/24 (from the past 24 hours)   CBC and Auto Differential   Result Value Ref Range    WBC 5.3 4.4 - 11.3 x10*3/uL    nRBC 0.0 0.0 - 0.0 /100 WBCs    RBC 3.95 (L) 4.00 - 5.20 x10*6/uL    Hemoglobin 11.2 (L) 12.0 - 16.0 g/dL    Hematocrit 34.2 (L) 36.0 - 46.0 %    MCV 87 80 - 100 fL    MCH 28.4 26.0 - 34.0 pg    MCHC 32.7 32.0 - 36.0 g/dL    RDW 15.1 (H) 11.5 - 14.5 %    Platelets 313 150 - 450 x10*3/uL    Neutrophils % 56.7 40.0 - 80.0 %    Immature Granulocytes %, Automated 0.4 0.0 - 0.9 %    Lymphocytes % 32.1 13.0 - 44.0 %    Monocytes % 6.8 2.0 - 10.0 %    Eosinophils % 3.4 0.0 - 6.0 %    Basophils % 0.6 0.0 - 2.0 %    Neutrophils Absolute 2.98 1.20 - 7.70 x10*3/uL    Immature Granulocytes Absolute, Automated 0.02 0.00 - 0.70 x10*3/uL    Lymphocytes Absolute 1.69 1.20 - 4.80 x10*3/uL    Monocytes Absolute 0.36 0.10 - 1.00 x10*3/uL    Eosinophils Absolute 0.18 0.00 - 0.70 x10*3/uL    Basophils Absolute 0.03 0.00 - 0.10 x10*3/uL   Comprehensive metabolic panel   Result Value Ref Range    Glucose 198 (H) 74 - 99 mg/dL    Sodium 142 136 - 145 mmol/L    Potassium 3.7 3.5 - 5.3 mmol/L    Chloride 103 98 - 107 mmol/L    Bicarbonate 27 21 - 32 mmol/L    Anion Gap 16 10 - 20 mmol/L    Urea Nitrogen 15 6 - 23 mg/dL    Creatinine 0.93 0.50 - 1.05 mg/dL    eGFR 77 >60 mL/min/1.73m*2    Calcium 9.1 8.6 - 10.6 mg/dL    Albumin 3.8 3.4 - 5.0 g/dL    Alkaline Phosphatase 105 33 - 110 U/L    Total Protein 6.6 6.4 - 8.2 g/dL    AST 14 9 - 39 U/L    Bilirubin, Total 0.3 0.0 - 1.2 mg/dL    ALT 24 7 - 45 U/L   Sedimentation rate, automated   Result Value Ref Range    Sedimentation Rate 21  (H) 0 - 20 mm/h   C-reactive protein   Result Value Ref Range    C-Reactive Protein 1.19 (H) <1.00 mg/dL   Coagulation Screen   Result Value Ref Range    Protime 11.5 9.8 - 12.8 seconds    INR 1.0 0.9 - 1.1    aPTT 42 (H) 27 - 38 seconds   Type and screen   Result Value Ref Range    ABO TYPE A     Rh TYPE POS     ANTIBODY SCREEN NEG    Prepare RBC: 1 Units   Result Value Ref Range    PRODUCT CODE P8244O78     Unit Number [id number]     Unit ABO A     Unit RH POS     XM INTEP COMP     Dispense Status XM     Blood Expiration Date 12/5/2024 11:59:00 PM EST     PRODUCT BLOOD TYPE 6200     UNIT VOLUME 350    Urinalysis with Reflex Culture and Microscopic   Result Value Ref Range    Color, Urine Light-Yellow Light-Yellow, Yellow, Dark-Yellow    Appearance, Urine Clear Clear    Specific Gravity, Urine 1.013 1.005 - 1.035    pH, Urine 7.5 5.0, 5.5, 6.0, 6.5, 7.0, 7.5, 8.0    Protein, Urine NEGATIVE NEGATIVE, 10 (TRACE), 20 (TRACE) mg/dL    Glucose, Urine Normal Normal mg/dL    Blood, Urine NEGATIVE NEGATIVE    Ketones, Urine NEGATIVE NEGATIVE mg/dL    Bilirubin, Urine NEGATIVE NEGATIVE    Urobilinogen, Urine Normal Normal mg/dL    Nitrite, Urine NEGATIVE NEGATIVE    Leukocyte Esterase, Urine NEGATIVE NEGATIVE   Glucose, CSF   Result Value Ref Range    Glucose, CSF 92 (H) 40 - 70 mg/dL   Protein, CSF   Result Value Ref Range    Total Protein, CSF 69 (H) 15 - 45 mg/dL   CSF Cell Count   Result Value Ref Range    Tube Number, CSF Unspecified       Color, CSF Colorless Colorless    Clarity, CSF Clear Clear    Color, Supernatant CSF Colorless      WBC, CSF 1 1 - 5 /uL    RBC, CSF 3 0 - 5 /uL   CSF Differential   Result Value Ref Range    Neutrophils %, Manual, CSF 0 0 - 5 %    Lymphocytes %, Manual,  (H) 28 - 96 %    Mono/Macrophages %, Manual, CSF 0 (L) 16 - 56 %    Eosinophils %, Manual, CSF 0 Rare %    Basophils %, Manual, CSF 0 Not Established %    Immature Granulocytes %, Manual, CSF 0 Not Established %     Blasts %, Manual, CSF 0 Not Established %    Unclassified Cells %, Manual, CSF 0 Not Established %    Plasma Cells %, Manual, CSF 0 Not Established %    Total Cells Counted, CSF 6      *Note: Due to a large number of results and/or encounters for the requested time period, some results have not been displayed. A complete set of results can be found in Results Review.     CT head wo IV contrast volumetric surgical planning    Result Date: 11/15/2024  Interpreted By:  Jasen Antoine, STUDY: CT HEAD WITHOUT CONTRAST VOLUMETRIC SURGICAL PLANNING;  11/15/2024 8:31 am   INDICATION: Signs/Symptoms:preop.   COMPARISON: 11/07/2024 head CT.   ACCESSION NUMBER(S): [id number]   ORDERING CLINICIAN: DAYANARA CHRIS   TECHNIQUE: Axial noncontrast head CT   FINDINGS: Right frontal ventriculostomy tube terminates near the foramen of [place] without significant change compared to the prior exam with hypodensity surrounding the parenchymal tract perhaps due to gliosis and/or minimal edema. No hydrocephalus. No other territorial loss of gray-white differentiation. Visualized paranasal sinuses and mastoid air cells are clear.       Stable examination compared with 11/07/2024   Signed by: Jasen Antoine 11/15/2024 8:44 AM Dictation workstation:   [id number]    Current Medications  Scheduled medications  amitriptyline, 100 mg, oral, Nightly  divalproex, 500 mg, oral, BID  ezetimibe, 10 mg, oral, Daily  fluticasone, 1 spray, Each Nostril, BID  fluticasone furoate-vilanteroL, 1 puff, inhalation, Daily  folic acid, 1 mg, oral, Daily  [Held by provider] furosemide, 20 mg, oral, BID  lacosamide, 250 mg, oral, q12h OLESYA  levETIRAcetam, 1,500 mg, oral, BID  [START ON 11/16/2024] levothyroxine, 75 mcg, oral, Daily before breakfast  pantoprazole, 40 mg, oral, BID AC  propranolol LA, 60 mg, oral, Daily  rOPINIRole, 0.5 mg, oral, q AM  rOPINIRole, 1 mg, oral, Nightly         Assessment/Plan  Jonathan Ayala is a 48 year old female with PMH of IIH (dx 2/2022 w/ OP  25) s/p R frontal bolt c/b tract hemorrhage and focal epilepsy, right hemiplegic migraines, CVID on monthly IVIG infusions, Sjogren's syndrome/scleroderma, POTS, T2DM, HTN, hypothyroidism, BRENT on CPAP, anxiety/depression, 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 9/17 p/w 1 mo R eye blurry vision, MRI brain RF encephalomalaxcia, MR orbit L optic nerve STIR signal, MRV negative, 9/18 s/p LP (OP 52), 9/21 s/p BAT for L hemiplegia, CTH/CTA stable RF encephalomalacia, no LVO, MRI neg, 9/24/2024 s/p RF  shunt (certas at 5), 10/23 presenting after ophtho clinic with Dr. Mederos and found to 10 days of total right eye blindness, SS intact, 10/23 MRI brain/orbit neg, 10/25 s/p LP (OP28, CP9), 10/29 p/w HA and visual changes, 10/30 s/p R VPS exploration w abdominal catheter repositioning w improvement in distal runnoff, 11/6 p/w min clear incisional drainage, min decr in vision, incision oversewn with no further leakage noted.   11/15 she presented to the ED with fluid leaking from incision site and positional headaches. Nontoxic appearing and noted to have clear drainage from cranial incision.      ## Incisional drainage  - Admit to Neurosurgery Service  - Possible OR for wound revision washout/shunt revision   - Keep NPO   - Preop labs (CBC, RFP, Coags, UA, Upreg, T&S), CXR, EKG  - Infectious workup   - ESR 21, CRP 1.19, CSF cultures obtained  - Monitor VS/pulse ox [id number]   - Monitor AM CBC/RFP    ## Headache  - Hx of IIH (2022)   - S/p RF  shunt (certas at 5)  - Shunt tap with CSF culture/gram stain  - Continue pain control     # Hx optic neuropathy and B/L vision loss  - Optho consult for baseline exam    # Hx of Seizures  - Currently stable  - Seizure precautions  - Continue home Depakote 500mg BID, Vimpat 250mg BID, and Keppra 1,500mg BID    # Hx HTN  - Continue home Propranolol 60mg    # Hx HypoT  - Continue home Levothyroxine 75mcg daily    # Hx DM2  - Monitor  blood glucose, ACHS  - SSI  - Continue home Lantus  - Diabetic diet when not NPO    # Hx anxiety/depression  - Continue home Elavil 100mg nightly, Requip 0.5mg in AM and 1mg nightly     # Pain control  - Well controlled per patient, continue current regimen       ·  Tylenol 650mg PO [id number] PRN mild pain        ·  Oxycodone 5/10mg PO X4hpeef PRN mod/severe pain   - Bowel regimen with Colace 100 mg PO BID while using narcotics  - IV Zofran PRN nausea due to narcotics   - Pain assessments S3xrect     Prophylaxis:   - DVT: SCDs, encourage ambulation   - Encourage IS x10 every hour while awake     FEN/GI:  - Monitor/replete electrolytes PRN   - NPO for possible OR  - mIVF while NPO  - GI protection with Home dose Protonix 40mg BID  - Bowel regimen: Scheduled Miralax and pericolace daily        Disposition: Admit to Neurosurgery    Patient and plan discussed with Chief Resident MD Maria Del Rosario Mc, APRN-[facility]  Neurologic Surgery   [place]  Team Pager #[phone]     Total face to face time spent with patient/family of > 60 minutes, with >50% of the time spent discussing plan of care/management, counseling/educating on disease processes, explaining results of diagnostic testing.

## 2024-11-15 NOTE — ANESTHESIA PROCEDURE NOTES
Airway  Date/Time: 11/15/2024 12:58 PM  Urgency: elective    Airway not difficult    Staffing  Performed: MARY ANN   Authorized by: Rakan Canales DO    Performed by: PATI Adams  Patient location during procedure: OR    Indications and Patient Condition  Indications for airway management: anesthesia  Spontaneous Ventilation: absent  Sedation level: deep  Preoxygenated: yes  Patient position: sniffing  Mask difficulty assessment: 0 - not attempted    Final Airway Details  Final airway type: supraglottic airway      Successful airway: Size 4      Additional Comments  Easy iGel by student

## 2024-11-15 NOTE — HOSPITAL COURSE
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) s/p R frontal bolt c/b tract hemorrhage and focal epilepsy, right hemiplegic migraines, CVID on monthly IVIG infusions, Sjogren's syndrome/scleroderma, POTS, T2DM, HTN, hypothyroidism, BRENT on CPAP, anxiety/depression, left non-arteritic anterior ischemic optic neruopathy with bilateral sequential vision loss 9/17 p/w 1 mo R eye blurry vision, MRI brain RF encephalomalaxcia, MR orbit L optic nerve STIR signal, MRV negative, 9/18 s/p LP (OP 52), 9/21 s/p BAT for L hemiplegia, CTH/CTA stable RF encephalomalacia, no LVO, MRI neg, 9/24/2024 s/p RF  shunt (certas at 5), 10/23 presenting after ophtho clinic with Dr. Mederos and found to 10 days of total right eye blindness, SS intact, 10/23 MRI brain/orbit neg, 10/25 s/p LP (OP28, CP9), 10/29 p/w HA and visual changes, 10/30 s/p R VPS exploration w abdominal catheter repositioning w improvement in distal runnoff, 11/6 p/w min clear incisional drainage, min decr in vision, incision oversewn with no further leakage noted.   11/15 she presented to the ED with fluid leaking from incision site. Admitted to Neurosurgery service for further evaluation and treatment.  Shunt tap and CSF sent    11/16 s/p VPS exploration, replacement of fractured valve (Certas with antisiphon at 5)    PLAN    Floor, suture removal on POD21 (FUV messaged), dispo in AM

## 2024-11-15 NOTE — OP NOTE
Revision Shunt Ventricular Peritoneal (R) Operative Note     Date: 11/15/2024  OR Location: [facility] OR    Name: Jonathan Ayala, : 1975, Age: 48 y.o., MRN: [id number], Sex: female    Diagnosis  Pre-op Diagnosis      * Postoperative wound dehiscence, initial encounter [T81.31XA] Post-op Diagnosis     * Postoperative wound dehiscence, initial encounter [T81.31XA]     Procedures  Revision Shunt Ventricular Peritoneal  51248 - NV RPLCMT/REVJ CSF SHUNT VALVE/CATH SHUNT SYS    Exploration of ventriculo-peritoneal shunt  Replacement of fractured valve (Certas MRI-SAFE valve with antisiphon set to 5)  Revision of cranial incision    Surgeons      * Avelino Tafoya - Primary    Resident/Fellow/Other Assistant:  Surgeons and Role:     * Norm Monson MD - Resident - Assisting     * Eder Melissa MD - Resident - Assisting    Staff:   Circulator: Mabel Guadalupe Person: Beatriz Puri Scrub: Edilia  Circulator: Dimitry Guadalupe Person: Felicia Puri Circulator: Edilia    Anesthesia Staff: Anesthesiologist: Rakan Canales DO  C-AA: PATI Adams; PATI Lazaro    Procedure Summary  Anesthesia: Anesthesia type not filed in the log.  ASA: ASA status not filed in the log.  Estimated Blood Loss: 25mL  Intra-op Medications:   Administrations occurring from 1159 to 1514 on 11/15/24:   Medication Name Total Dose   lidocaine-epinephrine (Xylocaine W/EPI) 0.5 %-1:200,000 injection 16 mL   gelatin absorbable (Gelfoam) 100 sponge 1 each   polymyxin B injection 500,000 Units   sodium chloride 0.9 % irrigation solution 1,000 mL   acetaminophen (Tylenol) tablet 650 mg Cannot be calculated   ceFAZolin (Ancef) vial 1 g 2 g   esmolol (Brevibloc) injection 100 mg   fentaNYL (Sublimaze) injection 50 mcg/mL 100 mcg   glycopyrrolate (Robinul) injection 0.3 mg   lacosamide (Vimpat) tablet 250 mg Cannot be calculated   lactated Ringer's infusion Cannot be calculated   lidocaine (Xylocaine) injection 2 % 100 mg   metoprolol  tartrate (Lopressor) injection 5 mg   midazolam PF (Versed) injection 1 mg/mL 2 mg   ondansetron (Zofran) 2 mg/mL injection 4 mg   oxyCODONE (Roxicodone) immediate release tablet 10 mg Cannot be calculated   oxyCODONE (Roxicodone) immediate release tablet 2.5 mg Cannot be calculated   oxyCODONE (Roxicodone) immediate release tablet 5 mg Cannot be calculated   phenylephrine 40 mcg/mL syringe 10 mL 400 mcg   propofol (Diprivan) injection 10 mg/mL 200 mg   remifentanil (Ultiva) 1,000 mcg in sodium chloride 0.9% 50 mL (20 mcg/mL) infusion 1.07 mg   vasopressin (Vasostrict) injection 20 units 2 Units              Anesthesia Record               Intraprocedure I/O Totals          Intake    Remifentanil Drip 0.00 mL    The total shown is the total volume documented since Anesthesia Start was filed.    Total Intake 0 mL          Specimen: No specimens collected              Drains and/or Catheters:   Intracranial Pressure/Ventriculostomy Ventricular drainage catheter Right Occipital region (Active)       Tourniquet Times:         Implants:  Implants       Type Name Action Serial No.      Neuro Interventional Implant VALVE, CERTAS PLUS, W/ DESHAWN - [id number] - [id number] Implanted [id number]              Findings: fractured valve    Indications: Jonathan Ayala is an 48 y.o. female who is having surgery for Postoperative wound dehiscence, initial encounter [T81.31XA]. The patient presented with persistent leaking from her cranial shunt incision, for which we offered surgical exploration and possible revision of her shunt.    The patient was seen in the preoperative area. The risks, benefits, complications, treatment options, non-operative alternatives, expected recovery and outcomes were discussed with the patient. The possibilities of reaction to medication, pulmonary aspiration, injury to surrounding structures, bleeding, recurrent infection, the need for additional procedures, failure to diagnose a condition, and  creating a complication requiring transfusion or operation were discussed with the patient. The patient concurred with the proposed plan, giving informed consent.  The site of surgery was properly noted/marked if necessary per policy. The patient has been actively warmed in preoperative area. Preoperative antibiotics have been ordered and given within 1 hours of incision. Venous thrombosis prophylaxis have been ordered including bilateral sequential compression devices    Procedure Details:     The patient was brought back from preop to OR. A safety huddle was performed and anesthesia was induced. The right side of the head was cleansed, prepped and draped in usual sterile fashion. Local anesthetic was used to infiltrate skin over incision.     The patient's prior C-shaped incision was reopened in its entirety. Upon adequate exposure of the [place] reservoir and shunt valve, it was discovered that the valve was fractured and was leaking CSF.     Therefore, we disconnected the valve from both the [place] reservoir and distal A-tubing. We confirmed proximal flow from the [place]. A new valve was dialed to 5, soaked in antibiotic-impregnated irrigation, and primed appropriately. The valve was then attached to the [place] reservoir as well as the distal A-tubing. Both connection sites were secured with 2-0 silk ties.     Hemostasis was obtained. The incision was copiously irrigated with antibiotic impregnated saline. Scalp incision was closed with 2-0 vicryls in the galea, 3-0 deep dermal vicryls, followed by 3-0 nylon horizontal mattress sutures along the entirety of the incision. A sterile dressing was applied.     All counts were correct at end of case and patient was turned over to anesthesia for extubation.      Complications:  None; patient tolerated the procedure well.    Disposition: PACU - hemodynamically stable.  Condition: stable                 Additional Details: none    Attending Attestation:     Avelino OROPEZA  Lott[place]  Phone Number: [phone]

## 2024-11-15 NOTE — CONSULTS
History of Present Illness:  Jonathan Ayala is a 48 y.o. female with PMH of left non-arteritic anterior ischemic optic neuropathy, bilateral sequential vision loss with right eye improvement 11/13/2019, idiopathic intracranial hypertension status post ventriculoperitoneal shunt , seizures, scleroderma, Sjogren, CVID on monthly IVIG, POTS, HTN, DM2, hypothyroidism, BRENT on CPAP, migraine recently s/p  shunt revision on 10/30 presenting for clear incisional drainage.   Ophthalmology consulted for baseline exam. Neurosurgery considering VPS exploration.     Patient reports no vision changes since last examined by Ophthalmology last week when she was admitted for a similar presentation. No ocular concerns today.       ROS: All other systems have been reviewed and are negative.    PMHx: please refer to admission HPI  Medications: please refer to medication reconciliation  Allergies: please refer to patient allergy list  Past Ocular History: as per above HPI  Family History: reviewed and noncontributory to chief ophthalmic complaint  Orientation: Alert and oriented x3, appropriate mood and behavior    Examination:     Base Eye Exam       Visual Acuity (Snellen - Linear)         Right Left    Near sc 20/30 PHNI 20/50 PH 20/30              Tonometry (Tonopen, 11:47 AM)         Right Left    Pressure 9 10              Pupils         Pupils Dark Light Shape React APD    Right PERRL, No APD 3 2 Round Sluggish None    Left PERRL, No APD 3 2 Round Sluggish None              Visual Fields         Left Right                                Extraocular Movement         Right Left     Full, Ortho Full, Ortho              Neuro/Psych       Oriented x3: Yes    Mood/Affect: Normal                  Additional Tests       Color         Right Left    Ishihara 4/11 5/11                  Slit Lamp and Fundus Exam       External Exam         Right Left    External Normal Normal              Slit Lamp Exam         Right Left    Lids/Lashes  Normal Normal    Conjunctiva/Sclera White and quiet White and quiet    Cornea Clear Clear    Anterior Chamber Deep and quiet Deep and quiet    Iris Round and reactive Round and reactive    Lens Clear Clear    Anterior Vitreous Normal Normal              Fundus Exam         Right Left    Disc pallor temporally Pallor    C/D Ratio 0.15 0.15    Macula Normal Normal    Vessels Normal Normal    Periphery Normal Normal                    Assessment and Plan:  #Idiopathic intracranial hypertension  #NAION, both eyes   #S/p  shunt  - Jonathan Ayala is a 48 y.o. female with PMH of left non-arteritic anterior ischemic optic neuropathy, bilateral sequential vision loss with right eye improvement 11/13/2019, idiopathic intracranial hypertension status post ventriculoperitoneal shunt , seizures, scleroderma, Sjogren, CVID on monthly IVIG, POTS, HTN, DM2, hypothyroidism, BRENT on CPAP, migraine  recently s/p  shunt revision on 10/30 presenting for clear incisional drainage  - Ophthalmology exam stable from prior 2 weeks of admissions  - Defer to Neurosurgery regarding wound leak  - Ophthalmology will continue to follow        Andry Bailey MD, PhD  Ophthalmology, PGY3     Ophthalmology Adult Pager - [phone]  Ophthalmology Pediatrics Pager (M-F 8:00am-5:00pm) - [phone]     For adult follow-up appointments, call: [phone]  For pediatric follow-up appointments, call: [phone]       NOTE: This note is not finalized until attending reviews and signs.

## 2024-11-15 NOTE — ED PROVIDER NOTES
Emergency Department Provider Note        History of Present Illness     History provided by: Patient  Limitations to History: None  External Records Reviewed with Brief Summary:  ED provider note from Dr. Gunter which showed PMHx    HPI:  Jonathan Ayala is a 48 y.o. female with PMHx of s/p  shunt revision, idiopathic intracranial hypertension with focal epilepsy and hemiplegic migraines, CVID on IVIG, hypothyroidism hypertension diabetes and left-sided ischemic optic neuropathy presents with complaint of wound check. Patient states yesterday she felt something dripping down her face on her R side. Patient is blind in her left eye, so her  looked and stated her wound had clear fluid leaking. Denies malodor. States she has incisional pain. States she saw her neurosurgeon Dr. Tafoya on 11/13 and he elected to leave the sutures in until the incision was fully healed. Denies being on any current Abx. Patients states around this time she developed nausea, vomiting, and headaches. States she took her migraine medication without relief. Denies F/C/D.  Per chart review patient's V/P shunt was tapped on 11/6 after presenting with wound drainage and concerns of shunt leaking. Was subsequently sutured into place on 11/7.       Physical Exam   Triage vitals:  T 37 °C (98.6 °F)  HR (!) 110  /87  RR 16  O2 96 % None (Room air)    Physical Exam  HENT:      Head:        Comments: Incision noted to R posterior-lateral skull, sutures in tact. Slight dehiscence to posterior aspect with discharge present, white/clear. No malodor. Erythema, edema, increased warmth present. Slight pain on palpation. No fluctuance or crepitation   Eyes:      Extraocular Movements: Extraocular movements intact.      Pupils: Pupils are equal, round, and reactive to light.   Cardiovascular:      Rate and Rhythm: Normal rate and regular rhythm.   Pulmonary:      Effort: Pulmonary effort is normal.      Breath sounds: Normal breath sounds.    Abdominal:      Palpations: Abdomen is soft.   Musculoskeletal:         General: Normal range of motion.   Skin:     General: Skin is warm.      Capillary Refill: Capillary refill takes less than 2 seconds.   Neurological:      General: No focal deficit present.      Mental Status: She is alert and oriented to person, place, and time.   Psychiatric:         Behavior: Behavior normal.          Medical Decision Making & ED Course   Medical Decision Makin y.o. female with PMHx stated above presenting with complaints of wound check. Per chart review patient was seen on  with concerns of wound infection and subsequently admitted. Concern for wound infection.    Ordered lab work    Ordered Reglan and benadryl for headache    Consult placed for NSGY    ----  Scoring Tools Utilized: NA    Differential diagnoses considered include but are not limited to:   -post-op wound infection     Social Determinants of Health which Significantly Impact Care: None identified     EKG Independent Interpretation: EKG not obtained    Independent Result Review and Interpretation: Relevant laboratory and radiographic results were reviewed and independently interpreted by myself.  As necessary, they are commented on in the ED Course.    Chronic conditions affecting the patient's care: As documented above in [facility]    The patient was discussed with the following consultants/services: None    Care Considerations: As documented above in [facility]    ED Course:  Diagnoses as of 11/15/24 0652   Postoperative wound dehiscence, initial encounter     Disposition   Patient was signed out to Dr. Cobian at 7:00 pending completion of their work-up.  Please see the next provider's transition of care note for the remainder of the patient's care.     Procedures   Procedures    Patient seen and discussed with ED attending physician.    Marce Veloz DPM  Emergency Medicine     Marce Veloz DPM  Resident  11/15/24 0657

## 2024-11-15 NOTE — ANESTHESIA PREPROCEDURE EVALUATION
Patient: Jonathan Ayala    Procedure Information       Anesthesia Start Date/Time: 11/15/24 1242    Procedure: Revision Shunt Ventricular Peritoneal (Right: Head)    Location: [facility] OR 25 / [facility] OR    Surgeons: Avelino Tafoya MD            Relevant Problems   Anesthesia   (+) Difficult intubation      Cardiac   (+) Benign essential hypertension   (+) Dyslipidemia, goal LDL below 100      Pulmonary   (+) Moderate persistent allergic asthma (HHS-HCC)      Neuro   (+) Idiopathic intracranial hypertension   (+) Lumbar radiculopathy   (+) Major depressive disorder, recurrent, in full remission with anxious distress (CMS-HCC)   (+) Seizure disorder (Multi)      GI   (+) Irritable bowel syndrome with diarrhea      Endocrine   (+) Class 3 severe obesity due to excess calories with serious comorbidity and body mass index (BMI) of 40.0 to 44.9 in adult   (+) Diabetic gastroparesis associated with type 2 diabetes mellitus (Multi)   (+) Hypothyroidism   (+) Type 2 diabetes mellitus with hyperglycemia, with long-term current use of insulin      HEENT   (+) Vision loss      ID   (+) Vaginal moniliasis       Clinical information reviewed:   Tobacco  Allergies  Meds   Med Hx  Surg Hx  OB Status  Fam Hx  Soc   Hx        NPO Detail:  NPO/Void Status  Date of Last Liquid: 11/14/24  Time of Last Liquid: 1059 (sips with meds)  Date of Last Solid: 11/14/24  Time of Last Solid: 2100  Last Intake Type: Food         Physical Exam    Airway  Mallampati: IV  TM distance: <3 FB  Neck ROM: full     Cardiovascular - normal exam     Dental - normal exam     Pulmonary - normal exam  Breath sounds clear to auscultation     Abdominal - normal exam             Anesthesia Plan    History of general anesthesia?: yes  History of complications of general anesthesia?: no    ASA 3     general     intravenous induction   Postoperative administration of opioids is intended.  Trial extubation is planned.  Anesthetic plan and risks  discussed with patient.    Plan discussed with CAA.

## 2024-11-15 NOTE — H&P
Department of Neurosurgery  History and Physical    Patient Name: Jonathan Ayala  MRN: [id number]  Date:  11/15/24     Subjective  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) s/p R frontal bolt c/b tract hemorrhage and focal epilepsy, right hemiplegic migraines, CVID on monthly IVIG infusions, Sjogren's syndrome/scleroderma, POTS, T2DM, HTN, hypothyroidism, BRENT on CPAP, anxiety/depression, 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 9/17 p/w 1 mo R eye blurry vision, MRI brain RF encephalomalaxcia, MR orbit L optic nerve STIR signal, MRV negative, 9/18 s/p LP (OP 52), 9/21 s/p BAT for L hemiplegia, CTH/CTA stable RF encephalomalacia, no LVO, MRI neg, 9/24/2024 s/p RF  shunt (certas at 5), 10/23 presenting after ophtho clinic with Dr. Mederos and found to 10 days of total right eye blindness, SS intact, 10/23 MRI brain/orbit neg, 10/25 s/p LP (OP28, CP9), 10/29 p/w HA and visual changes, 10/30 s/p R VPS exploration w abdominal catheter repositioning w improvement in distal runnoff, 11/6 p/w min clear incisional drainage, min decr in vision, incision oversewn with no further leakage noted.   11/15 she presented to the ED with fluid leaking from incision site and headache. She denies any CP/SOB, fever/chills or n/v/d.       Past Surgical History:   Procedure Laterality Date    APPENDECTOMY  2017    BRAIN SURGERY  Morven placement to measure pressure    CARDIAC CATHETERIZATION      CHOLECYSTECTOMY      ENDOMETRIAL ABLATION      FRACTURE SURGERY  12/2023    HERNIA REPAIR Right 03/01/2024    with mesh    HYSTERECTOMY  2017    MR HEAD ANGIO WO IV CONTRAST  02/08/2021    MR HEAD ANGIO WO IV CONTRAST 2/8/2021 [facility]ICAL [facility]    MR NECK ANGIO WO IV CONTRAST  02/08/2021    MR NECK ANGIO WO IV CONTRAST 2/8/2021 [facility] CLINICAL [facility]GAC[facility]    OTHER SURGICAL HISTORY  08/22/2019    Carpal tunnel surgery    OTHER SURGICAL HISTORY  08/22/2019    Hysterectomy     "OTHER SURGICAL HISTORY  08/22/2019    Venous access port placement    OTHER SURGICAL HISTORY  08/22/2019    Cholecystectomy    OTHER SURGICAL HISTORY  08/22/2019    Appendectomy    OTHER SURGICAL HISTORY  08/22/2019    Pyloroplasty    WISDOM TOOTH EXTRACTION  2004         Social History     Tobacco Use    Smoking status: Never    Smokeless tobacco: Never   Vaping Use    Vaping status: Never Used   Substance Use Topics    Alcohol use: Not Currently     Comment: Socially in College    Drug use: Yes     Types: Benzodiazepines     Review of Systems   Constitutional:  Negative for chills and fever.   HENT:  Negative for tinnitus.    Eyes:         B/L vision loss   Respiratory:  Negative for chest tightness, shortness of breath and wheezing.    Cardiovascular:  Negative for chest pain and leg swelling.   Gastrointestinal:  Negative for abdominal pain, nausea and vomiting.   Genitourinary:  Negative for difficulty urinating.   Musculoskeletal:  Negative for neck pain and neck stiffness.   Skin:  Positive for wound.        Cranial incision with clear drainage   Neurological:  Positive for headaches. Negative for dizziness, seizures, speech difficulty, weakness and light-headedness.        BUE 5/5  BLE 5/5  AOx3   Psychiatric/Behavioral:  Negative for agitation and confusion.         Objective    Vital Signs  /87 (BP Location: Right arm, Patient Position: Lying)   Pulse (!) 104   Temp 37 °C (98.6 °F) (Temporal)   Resp 16   Ht 1.575 m (5' 2\")   Wt 93 kg (205 lb)   SpO2 97%   BMI 37.49 kg/m²      Physical Exam  Constitutional: A&Ox3, calm and cooperative, NAD.  Eyes: PERRL, clear sclera, impaired vision  ENMT: Moist mucous membranes, no apparent injuries or lesions.  Head/Neck: Neck supple, no JVD. NC/AT.   Cardiovascular: Normal rate and regular rhythm. 2+ equal pulses of the distal extremities.  Respiratory/Thorax: CTAB, regular respirations on RA. Good symmetric chest expansion.   Gastrointestinal: Abdomen " soft, non tender.   Genitourinary: Voiding independently.   Extremities: No peripheral edema. Moving all 4 extremities actively.   Neurological: A&Ox3, FC x4, 5/5x4  Psychological: Appropriate mood and behavior.   Skin: cranial incision with clear drainage     Diagnostics   Results for orders placed or performed during the hospital encounter of 11/15/24 (from the past 24 hours)   CBC and Auto Differential   Result Value Ref Range    WBC 5.3 4.4 - 11.3 x10*3/uL    nRBC 0.0 0.0 - 0.0 /100 WBCs    RBC 3.95 (L) 4.00 - 5.20 x10*6/uL    Hemoglobin 11.2 (L) 12.0 - 16.0 g/dL    Hematocrit 34.2 (L) 36.0 - 46.0 %    MCV 87 80 - 100 fL    MCH 28.4 26.0 - 34.0 pg    MCHC 32.7 32.0 - 36.0 g/dL    RDW 15.1 (H) 11.5 - 14.5 %    Platelets 313 150 - 450 x10*3/uL    Neutrophils % 56.7 40.0 - 80.0 %    Immature Granulocytes %, Automated 0.4 0.0 - 0.9 %    Lymphocytes % 32.1 13.0 - 44.0 %    Monocytes % 6.8 2.0 - 10.0 %    Eosinophils % 3.4 0.0 - 6.0 %    Basophils % 0.6 0.0 - 2.0 %    Neutrophils Absolute 2.98 1.20 - 7.70 x10*3/uL    Immature Granulocytes Absolute, Automated 0.02 0.00 - 0.70 x10*3/uL    Lymphocytes Absolute 1.69 1.20 - 4.80 x10*3/uL    Monocytes Absolute 0.36 0.10 - 1.00 x10*3/uL    Eosinophils Absolute 0.18 0.00 - 0.70 x10*3/uL    Basophils Absolute 0.03 0.00 - 0.10 x10*3/uL   Comprehensive metabolic panel   Result Value Ref Range    Glucose 198 (H) 74 - 99 mg/dL    Sodium 142 136 - 145 mmol/L    Potassium 3.7 3.5 - 5.3 mmol/L    Chloride 103 98 - 107 mmol/L    Bicarbonate 27 21 - 32 mmol/L    Anion Gap 16 10 - 20 mmol/L    Urea Nitrogen 15 6 - 23 mg/dL    Creatinine 0.93 0.50 - 1.05 mg/dL    eGFR 77 >60 mL/min/1.73m*2    Calcium 9.1 8.6 - 10.6 mg/dL    Albumin 3.8 3.4 - 5.0 g/dL    Alkaline Phosphatase 105 33 - 110 U/L    Total Protein 6.6 6.4 - 8.2 g/dL    AST 14 9 - 39 U/L    Bilirubin, Total 0.3 0.0 - 1.2 mg/dL    ALT 24 7 - 45 U/L   Sedimentation rate, automated   Result Value Ref Range    Sedimentation Rate 21  (H) 0 - 20 mm/h   C-reactive protein   Result Value Ref Range    C-Reactive Protein 1.19 (H) <1.00 mg/dL   Coagulation Screen   Result Value Ref Range    Protime 11.5 9.8 - 12.8 seconds    INR 1.0 0.9 - 1.1    aPTT 42 (H) 27 - 38 seconds   Type and screen   Result Value Ref Range    ABO TYPE A     Rh TYPE POS     ANTIBODY SCREEN NEG    Prepare RBC: 1 Units   Result Value Ref Range    PRODUCT CODE Q5350W06     Unit Number [id number]     Unit ABO A     Unit RH POS     XM INTEP COMP     Dispense Status XM     Blood Expiration Date 12/5/2024 11:59:00 PM EST     PRODUCT BLOOD TYPE 6200     UNIT VOLUME 350    Urinalysis with Reflex Culture and Microscopic   Result Value Ref Range    Color, Urine Light-Yellow Light-Yellow, Yellow, Dark-Yellow    Appearance, Urine Clear Clear    Specific Gravity, Urine 1.013 1.005 - 1.035    pH, Urine 7.5 5.0, 5.5, 6.0, 6.5, 7.0, 7.5, 8.0    Protein, Urine NEGATIVE NEGATIVE, 10 (TRACE), 20 (TRACE) mg/dL    Glucose, Urine Normal Normal mg/dL    Blood, Urine NEGATIVE NEGATIVE    Ketones, Urine NEGATIVE NEGATIVE mg/dL    Bilirubin, Urine NEGATIVE NEGATIVE    Urobilinogen, Urine Normal Normal mg/dL    Nitrite, Urine NEGATIVE NEGATIVE    Leukocyte Esterase, Urine NEGATIVE NEGATIVE   Glucose, CSF   Result Value Ref Range    Glucose, CSF 92 (H) 40 - 70 mg/dL   Protein, CSF   Result Value Ref Range    Total Protein, CSF 69 (H) 15 - 45 mg/dL   CSF Cell Count   Result Value Ref Range    Tube Number, CSF Unspecified       Color, CSF Colorless Colorless    Clarity, CSF Clear Clear    Color, Supernatant CSF Colorless      WBC, CSF 1 1 - 5 /uL    RBC, CSF 3 0 - 5 /uL   CSF Differential   Result Value Ref Range    Neutrophils %, Manual, CSF 0 0 - 5 %    Lymphocytes %, Manual,  (H) 28 - 96 %    Mono/Macrophages %, Manual, CSF 0 (L) 16 - 56 %    Eosinophils %, Manual, CSF 0 Rare %    Basophils %, Manual, CSF 0 Not Established %    Immature Granulocytes %, Manual, CSF 0 Not Established %     Blasts %, Manual, CSF 0 Not Established %    Unclassified Cells %, Manual, CSF 0 Not Established %    Plasma Cells %, Manual, CSF 0 Not Established %    Total Cells Counted, CSF 6      *Note: Due to a large number of results and/or encounters for the requested time period, some results have not been displayed. A complete set of results can be found in Results Review.     CT head wo IV contrast volumetric surgical planning    Result Date: 11/15/2024  Interpreted By:  Jasen Antoine, STUDY: CT HEAD WITHOUT CONTRAST VOLUMETRIC SURGICAL PLANNING;  11/15/2024 8:31 am   INDICATION: Signs/Symptoms:preop.   COMPARISON: 11/07/2024 head CT.   ACCESSION NUMBER(S): [id number]   ORDERING CLINICIAN: DAYANARA CHRIS   TECHNIQUE: Axial noncontrast head CT   FINDINGS: Right frontal ventriculostomy tube terminates near the foramen of Monro without significant change compared to the prior exam with hypodensity surrounding the parenchymal tract perhaps due to gliosis and/or minimal edema. No hydrocephalus. No other territorial loss of gray-white differentiation. Visualized paranasal sinuses and mastoid air cells are clear.       Stable examination compared with 11/07/2024   Signed by: Jasen Antoine 11/15/2024 8:44 AM Dictation workstation:   [id number]    Current Medications  Scheduled medications  amitriptyline, 100 mg, oral, Nightly  divalproex, 500 mg, oral, BID  ezetimibe, 10 mg, oral, Daily  fluticasone, 1 spray, Each Nostril, BID  fluticasone furoate-vilanteroL, 1 puff, inhalation, Daily  folic acid, 1 mg, oral, Daily  [Held by provider] furosemide, 20 mg, oral, BID  lacosamide, 250 mg, oral, q12h OLESYA  levETIRAcetam, 1,500 mg, oral, BID  [START ON 11/16/2024] levothyroxine, 75 mcg, oral, Daily before breakfast  pantoprazole, 40 mg, oral, BID AC  propranolol LA, 60 mg, oral, Daily  rOPINIRole, 0.5 mg, oral, q AM  rOPINIRole, 1 mg, oral, Nightly         Assessment/Plan  Jonathan Ayala is a 48 year old female with PMH of IIH (dx 2/2022 w/ OP  25) s/p R frontal bolt c/b tract hemorrhage and focal epilepsy, right hemiplegic migraines, CVID on monthly IVIG infusions, Sjogren's syndrome/scleroderma, POTS, T2DM, HTN, hypothyroidism, BRENT on CPAP, anxiety/depression, 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 9/17 p/w 1 mo R eye blurry vision, MRI brain RF encephalomalaxcia, MR orbit L optic nerve STIR signal, MRV negative, 9/18 s/p LP (OP 52), 9/21 s/p BAT for L hemiplegia, CTH/CTA stable RF encephalomalacia, no LVO, MRI neg, 9/24/2024 s/p RF  shunt (certas at 5), 10/23 presenting after ophtho clinic with Dr. Mederos and found to 10 days of total right eye blindness, SS intact, 10/23 MRI brain/orbit neg, 10/25 s/p LP (OP28, CP9), 10/29 p/w HA and visual changes, 10/30 s/p R VPS exploration w abdominal catheter repositioning w improvement in distal runnoff, 11/6 p/w min clear incisional drainage, min decr in vision, incision oversewn with no further leakage noted.   11/15 she presented to the ED with fluid leaking from incision site and positional headaches. Nontoxic appearing and noted to have clear drainage from cranial incision.      ## Incisional drainage  - Admit to Neurosurgery Service  - Possible OR for wound revision washout/shunt revision   - Keep NPO   - Preop labs (CBC, RFP, Coags, UA, Upreg, T&S), CXR, EKG  - Infectious workup   - ESR 21, CRP 1.19, CSF cultures obtained  - Monitor VS/pulse ox [id number]   - Monitor AM CBC/RFP    ## Headache  - Hx of IIH (2022)   - S/p RF  shunt (certas at 5)  - Shunt tap with CSF culture/gram stain  - Continue pain control     # Hx optic neuropathy and B/L vision loss  - Optho consult for baseline exam    # Hx of Seizures  - Currently stable  - Seizure precautions  - Continue home Depakote 500mg BID, Vimpat 250mg BID, and Keppra 1,500mg BID    # Hx HTN  - Continue home Propranolol 60mg    # Hx HypoT  - Continue home Levothyroxine 75mcg daily    # Hx DM2  - Monitor  blood glucose, ACHS  - SSI  - Continue home Lantus  - Diabetic diet when not NPO    # Hx anxiety/depression  - Continue home Elavil 100mg nightly, Requip 0.5mg in AM and 1mg nightly     # Pain control  - Well controlled per patient, continue current regimen       ·  Tylenol 650mg PO [id number] PRN mild pain        ·  Oxycodone 5/10mg PO [id number] PRN mod/severe pain   - Bowel regimen with Colace 100 mg PO BID while using narcotics  - IV Zofran PRN nausea due to narcotics   - Pain assessments [id number]     Prophylaxis:   - DVT: SCDs, encourage ambulation   - Encourage IS x10 every hour while awake     FEN/GI:  - Monitor/replete electrolytes PRN   - NPO for possible OR  - mIVF while NPO  - GI protection with Home dose Protonix 40mg BID  - Bowel regimen: Scheduled Miralax and pericolace daily        Disposition: Admit to Neurosurgery    Patient and plan discussed with Chief Resident MD Maria Del Rosario Mc, APRN-[facility]  Neurologic Surgery   [place]  Team Pager #[phone]     Total face to face time spent with patient/family of > 60 minutes, with >50% of the time spent discussing plan of care/management, counseling/educating on disease processes, explaining results of diagnostic testing.

## 2024-11-15 NOTE — ED TRIAGE NOTES
Pt states that she has fluid leaking from her incision site. Pt states that she had her shunt tapped last week.

## 2024-11-15 NOTE — PROGRESS NOTES
Emergency Department Transition of Care Note       Signout   I received Jonathan Ayala in signout from Dr. Veloz.  Please see the ED Provider Note for all HPI, PE and MDM up to the time of signout at 0700.  This is in addition to the primary record.    In brief Jonathan Ayala is an 48 y.o. female presenting for concerns of leakage from incision in her head from neurosurgery    At the time of signout we were awaiting:  Neurosurgery recommendation    ED Course & Medical Decision Making   Medical Decision Making:  Under my care, patient remained hemodynamically stable.  Neurosurgery came by and evaluated the patient and ultimately agreed to admit them to their service for further management, they tapped the shunt while in the emergency department.  Patient was agreeable with plan, patient admitted to neurosurgery for further management    ED Course:  Diagnoses as of 11/15/24 1159   Postoperative wound dehiscence, initial encounter       Disposition   As a result of their workup, the patient will require admission to the hospital.  The patient was informed of her diagnosis.  The patient was given the opportunity to ask questions and I answered them. The patient agreed to be admitted to the hospital.    Procedures   Procedures    Woodrow Cobian DO  Emergency Medicine

## 2024-11-16 VITALS
RESPIRATION RATE: 18 BRPM | DIASTOLIC BLOOD PRESSURE: 74 MMHG | TEMPERATURE: 97.5 F | OXYGEN SATURATION: 95 % | HEIGHT: 62 IN | SYSTOLIC BLOOD PRESSURE: 126 MMHG | BODY MASS INDEX: 37.73 KG/M2 | HEART RATE: 104 BPM | WEIGHT: 205.03 LBS

## 2024-11-16 LAB
GLUCOSE BLD MANUAL STRIP-MCNC: 232 MG/DL (ref 74–99)
GLUCOSE BLD MANUAL STRIP-MCNC: 285 MG/DL (ref 74–99)

## 2024-11-16 PROCEDURE — 2500000002 HC RX 250 W HCPCS SELF ADMINISTERED DRUGS (ALT 637 FOR MEDICARE OP, ALT 636 FOR OP/ED): Performed by: STUDENT IN AN ORGANIZED HEALTH CARE EDUCATION/TRAINING PROGRAM

## 2024-11-16 PROCEDURE — 2500000001 HC RX 250 WO HCPCS SELF ADMINISTERED DRUGS (ALT 637 FOR MEDICARE OP): Performed by: STUDENT IN AN ORGANIZED HEALTH CARE EDUCATION/TRAINING PROGRAM

## 2024-11-16 PROCEDURE — 2500000005 HC RX 250 GENERAL PHARMACY W/O HCPCS: Performed by: STUDENT IN AN ORGANIZED HEALTH CARE EDUCATION/TRAINING PROGRAM

## 2024-11-16 PROCEDURE — 2500000004 HC RX 250 GENERAL PHARMACY W/ HCPCS (ALT 636 FOR OP/ED): Performed by: STUDENT IN AN ORGANIZED HEALTH CARE EDUCATION/TRAINING PROGRAM

## 2024-11-16 PROCEDURE — G0378 HOSPITAL OBSERVATION PER HR: HCPCS

## 2024-11-16 PROCEDURE — 82947 ASSAY GLUCOSE BLOOD QUANT: CPT

## 2024-11-16 PROCEDURE — 2500000004 HC RX 250 GENERAL PHARMACY W/ HCPCS (ALT 636 FOR OP/ED)

## 2024-11-16 RX ORDER — OXYCODONE HYDROCHLORIDE 5 MG/1
5 TABLET ORAL EVERY 6 HOURS PRN
Qty: 20 TABLET | Refills: 0 | Status: SHIPPED | OUTPATIENT
Start: 2024-11-16 | End: 2024-11-21

## 2024-11-16 RX ORDER — HEPARIN 100 UNIT/ML
1 SYRINGE INTRAVENOUS AS NEEDED
Status: DISCONTINUED | OUTPATIENT
Start: 2024-11-16 | End: 2024-11-16

## 2024-11-16 RX ORDER — HEPARIN 100 UNIT/ML
5 SYRINGE INTRAVENOUS AS NEEDED
Status: DISCONTINUED | OUTPATIENT
Start: 2024-11-16 | End: 2024-11-16 | Stop reason: HOSPADM

## 2024-11-16 RX ORDER — HEPARIN SODIUM 5000 [USP'U]/ML
5000 INJECTION, SOLUTION INTRAVENOUS; SUBCUTANEOUS EVERY 8 HOURS SCHEDULED
Status: DISCONTINUED | OUTPATIENT
Start: 2024-11-16 | End: 2024-11-16 | Stop reason: HOSPADM

## 2024-11-16 RX ADMIN — ROPINIROLE HYDROCHLORIDE 0.5 MG: 0.5 TABLET, FILM COATED ORAL at 08:10

## 2024-11-16 RX ADMIN — HEPARIN SODIUM 5000 UNITS: 5000 INJECTION, SOLUTION INTRAVENOUS; SUBCUTANEOUS at 14:49

## 2024-11-16 RX ADMIN — ONDANSETRON 4 MG: 4 TABLET, ORALLY DISINTEGRATING ORAL at 08:09

## 2024-11-16 RX ADMIN — PANTOPRAZOLE SODIUM 40 MG: 40 TABLET, DELAYED RELEASE ORAL at 06:34

## 2024-11-16 RX ADMIN — PROPRANOLOL HYDROCHLORIDE 60 MG: 60 CAPSULE, EXTENDED RELEASE ORAL at 08:10

## 2024-11-16 RX ADMIN — POLYETHYLENE GLYCOL 3350 17 G: 17 POWDER, FOR SOLUTION ORAL at 00:34

## 2024-11-16 RX ADMIN — DIVALPROEX SODIUM 500 MG: 500 TABLET, FILM COATED, EXTENDED RELEASE ORAL at 08:09

## 2024-11-16 RX ADMIN — LEVOTHYROXINE SODIUM 75 MCG: 75 TABLET ORAL at 06:34

## 2024-11-16 RX ADMIN — OXYCODONE HYDROCHLORIDE 10 MG: 10 TABLET ORAL at 08:09

## 2024-11-16 RX ADMIN — HEPARIN SODIUM 5000 UNITS: 5000 INJECTION, SOLUTION INTRAVENOUS; SUBCUTANEOUS at 00:30

## 2024-11-16 RX ADMIN — EZETIMIBE 10 MG: 10 TABLET ORAL at 08:10

## 2024-11-16 RX ADMIN — HEPARIN 500 UNITS: 100 SYRINGE at 17:10

## 2024-11-16 RX ADMIN — HEPARIN SODIUM 5000 UNITS: 5000 INJECTION, SOLUTION INTRAVENOUS; SUBCUTANEOUS at 06:34

## 2024-11-16 RX ADMIN — FLUTICASONE PROPIONATE 1 SPRAY: 50 SPRAY, METERED NASAL at 08:13

## 2024-11-16 RX ADMIN — LEVETIRACETAM 1500 MG: 500 TABLET, FILM COATED ORAL at 08:10

## 2024-11-16 RX ADMIN — PANTOPRAZOLE SODIUM 40 MG: 40 TABLET, DELAYED RELEASE ORAL at 15:21

## 2024-11-16 RX ADMIN — POLYETHYLENE GLYCOL 3350 17 G: 17 POWDER, FOR SOLUTION ORAL at 08:13

## 2024-11-16 RX ADMIN — FOLIC ACID 1 MG: 1 TABLET ORAL at 08:10

## 2024-11-16 RX ADMIN — LACOSAMIDE 250 MG: 100 TABLET, FILM COATED ORAL at 08:09

## 2024-11-16 ASSESSMENT — PAIN - FUNCTIONAL ASSESSMENT
PAIN_FUNCTIONAL_ASSESSMENT: 0-10

## 2024-11-16 ASSESSMENT — PAIN DESCRIPTION - LOCATION: LOCATION: HEAD

## 2024-11-16 ASSESSMENT — PAIN SCALES - GENERAL
PAINLEVEL_OUTOF10: 0 - NO PAIN
PAINLEVEL_OUTOF10: 3
PAINLEVEL_OUTOF10: 8

## 2024-11-16 NOTE — PROGRESS NOTES
11/16/24 1130   Discharge Planning   Living Arrangements Spouse/significant other   Support Systems Spouse/significant other   Type of Residence Private residence   Number of Stairs to Enter Residence 2   Number of Stairs Within Residence 2   Do you have animals or pets at home? Yes   Type of Animals or Pets 1Dog   Home or Post Acute Services None   Expected Discharge Disposition Home   Does the patient need discharge transport arranged? No     Met with patient to introduce myself, role and discuss discharge planning.  Patient lives with spouse.  Patient has a cane/walker to assist with mobility if needed.  Patient does feel safe at home and denies any issues with making follow up appointments or getting her medications.   Patient has a CPAP.  Patient denies active home care or home care needs.  Patient denies any issues with making follow up appointments or getting her medications.  Patient expressed no questions and no social work concerns.  PCP:  Isidoro Mensah  Pharmacy:  [facility]  sunitha Care:  N/A  DME: Medical Services

## 2024-11-16 NOTE — PROGRESS NOTES
"Jonahtan Ayala is a 48 y.o. female on day 1 of admission presenting with Wound dehiscence, surgical.      Subjective   Jonathan Ayala is a 48 y.o. female with PMH of left non-arteritic anterior ischemic optic neuropathy, bilateral sequential vision loss with right eye improvement 11/13/2019, idiopathic intracranial hypertension status post ventriculoperitoneal shunt , seizures, scleroderma, Sjogren, CVID on monthly IVIG, POTS, HTN, DM2, hypothyroidism, BRENT on CPAP, migraine recently s/p  shunt revision on 10/30 presenting for clear incisional drainage. She is now status post (s/p) exploration of  shunt, replacement of fractured valve and revision of cranial incision 11/15/24 with NSGY.      Patient reports that she thinks she is starting to see slightly better in dim lighting. She denies any sudden vision loss, diplopia or pain with eye movements.        ROS: All other systems have been reviewed and are negative.     PMHx: please refer to admission HPI  Medications: please refer to medication reconciliation  Allergies: please refer to patient allergy list  Past Ocular History: as per above HPI  Family History: reviewed and noncontributory to chief ophthalmic complaint  Orientation: Alert and oriented x3, appropriate mood and behavior       Objective     Last Recorded Vitals  Blood pressure 136/82, pulse (!) 112, temperature 36 °C (96.8 °F), resp. rate 18, height 1.575 m (5' 2\"), weight 93 kg (205 lb 0.4 oz), SpO2 92%.    Physical Exam  Base Eye Exam       Visual Acuity (Snellen - Linear)         Right Left    Near sc 20/30-2 ph 20/20-2 20/50-2 ph 20/30-1              Tonometry (Tonopen, 8:22 AM)         Right Left    Pressure 10 11              Pupils         Dark Light Shape React APD    Right 4 3 Round Brisk None    Left 4 3 Round Brisk None              Visual Fields         Left Right                                Extraocular Movement         Right Left     Full Full              Neuro/Psych       Oriented " x3: Yes    Mood/Affect: Normal                  Additional Tests       Color         Right Left    Ishihara 6/14 3/14                  Slit Lamp and Fundus Exam       External Exam         Right Left    External Normal Normal              Slit Lamp Exam         Right Left    Lids/Lashes Normal Normal    Conjunctiva/Sclera White and quiet White and quiet    Cornea Clear Clear    Anterior Chamber Deep and quiet Deep and quiet    Iris Round and reactive Round and reactive    Lens Clear Clear    Anterior Vitreous Normal Normal                      Assessment/Plan   Assessment & Plan  Wound dehiscence, surgical    Postoperative wound dehiscence, initial encounter      Update 11/16/24:  - Entrance testing is stable status post (s/p)  shunt revision yesterday, 11/15/24  - Ophthalmology will continue to follow peripherally while she remains admitted  - She has outpatient follow up with neuro-ophthalmology 12/5/24     Assessment and Plan:  #Idiopathic intracranial hypertension  #NAION, both eyes   #S/p  shunt  - Jonathan Ayala is a 48 y.o. female with PMH of left non-arteritic anterior ischemic optic neuropathy, bilateral sequential vision loss with right eye improvement 11/13/2019, idiopathic intracranial hypertension status post ventriculoperitoneal shunt , seizures, scleroderma, Sjogren, CVID on monthly IVIG, POTS, HTN, DM2, hypothyroidism, BRENT on CPAP, migraine  recently s/p  shunt revision on 10/30 presenting for clear incisional drainage  - now status post (s/p) exploration of  shunt, replacement of fractured valve and revision of cranial incision 11/15/24 with NSGY.   - Ophthalmology exam stable from prior 2 weeks of admissions  - Ophthalmology will continue to follow while admitted  - Patient already has outpatient follow up with Dr. Mederos scheduled for 12/5/24.        Jay Barron MD  Ophthalmology, PGY3     Ophthalmology Adult Pager - [phone]  Ophthalmology Pediatrics Pager (M-F 8:00am-5:00pm) - [phone]      For adult follow-up appointments, call: [phone]  For pediatric follow-up appointments, call: [phone]         NOTE: This note is not finalized until attending reviews and signs.

## 2024-11-16 NOTE — PROGRESS NOTES
"Jonathan Ayala is a 48 y.o. female on day 1 of admission presenting with Wound dehiscence, surgical.    Subjective   NAEON       Objective     Physical Exam   BUE 5/5  BLE 5/5  AOx3   Last Recorded Vitals  Blood pressure 98/59, pulse (!) 113, temperature 36.3 °C (97.3 °F), temperature source Temporal, resp. rate 18, height 1.575 m (5' 2\"), weight 93 kg (205 lb 0.4 oz), SpO2 97%.  Intake/Output last 3 Shifts:  I/O last 3 completed shifts:  In: 900 (9.7 mL/kg) [I.V.:900 (9.7 mL/kg)]  Out: 20 (0.2 mL/kg) [Blood:20]  Weight: 93 kg     Relevant Results                              Assessment/Plan   Assessment & Plan  Wound dehiscence, surgical    Postoperative wound dehiscence, initial encounter    seda Ayala is a 48 year old female with PMH of IIH (dx 2/2022 w/ OP 25) s/p R frontal bolt c/b tract hemorrhage and focal epilepsy, right hemiplegic migraines, CVID on monthly IVIG infusions, Sjogren's syndrome/scleroderma, POTS, T2DM, HTN, hypothyroidism, BRENT on CPAP, anxiety/depression, 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 9/17 p/w 1 mo R eye blurry vision, MRI brain RF encephalomalaxcia, MR orbit L optic nerve STIR signal, MRV negative, 9/18 s/p LP (OP 52), 9/21 s/p BAT for L hemiplegia, CTH/CTA stable RF encephalomalacia, no LVO, MRI neg, 9/24/2024 s/p RF  shunt (certas at 5), 10/23 presenting after ophtho clinic with Dr. Mederos and found to 10 days of total right eye blindness, SS intact, 10/23 MRI brain/orbit neg, 10/25 s/p LP (OP28, CP9), 10/29 p/w HA and visual changes, 10/30 s/p R VPS exploration w abdominal catheter repositioning w improvement in distal runnoff, 11/6 p/w min clear incisional drainage, min decr in vision, incision oversewn with no further leakage noted.   11/15 she presented to the ED with fluid leaking from incision site and headache    11/15 s/p RF shunt wound revision and fractured valve replacement    Floor  Dispo when able           Albert S " MD [place]rie

## 2024-11-16 NOTE — DISCHARGE SUMMARY
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 Mederos and found to 10 days of total right eye blindness, SS intact, 10/23 MRI brain/orbit neg, 10/25 s/p LP (OP28, CP9), 10/29 p/w HA and visual changes, 10/30 s/p R VPS exploration w abdominal catheter repositioning w improvement in distal runnoff, 11/6 p/w min clear incisional drainage, min decr in vision, incision oversewn with no further leakage noted.   11/15 she presented to the ED with fluid leaking from incision site. Admitted to Neurosurgery service for further evaluation and treatment.  Shunt tap and CSF sent    11/16 s/p VPS exploration, replacement of fractured valve (Certas with antisiphon at 5)    On the day of discharge, the patient was seen and evaluated by the neurosurgery team and deemed suitable for discharge to home.    There were no significant events overnight. Vitals were reviewed and within normal limits. Labs were stable at discharge. On day of discharge the patient was tolerating a diet, pain was controlled on PO pain medication, was ambulating well  "and voiding spontaneously. The patient was given detailed discharge instructions and were scheduled to follow up as an outpatient.       Pertinent Physical Exam At Time of Discharge  Physical Exam  Awake, Ox3  Breathing comfortably on room air  BUE 5/5  BLE 5/5  SILT   Incison c/d/I  SILT    Home Medications     Medication List      START taking these medications     oxyCODONE 5 mg immediate release tablet; Commonly known as: Roxicodone;   Take 1 tablet (5 mg) by mouth every 6 hours if needed for moderate pain (4   - 6) for up to 5 days.     CHANGE how you take these medications     fluticasone 50 mcg/actuation nasal spray; Commonly known as: Flonase;   USE 1 SPRAY INTO EACH NOSTRIL ONCE DAILY.; What changed: See the new   instructions.   insulin glargine 300 unit/mL (3 mL) injection; Commonly known as: Toujeo   Max Solostar- 2 unit dial; Inject 52 Units under the skin once daily in   the morning. Inject 50 units under skin once daily; What changed:   additional instructions     CONTINUE taking these medications     acetaminophen 325 mg tablet; Commonly known as: Tylenol   Advair -21 mcg/actuation inhaler; Generic drug: fluticasone   propion-salmeteroL   amitriptyline 100 mg tablet; Commonly known as: Elavil; Take 1 tablet   (100 mg) by mouth once daily at bedtime.   azelastine 137 mcg (0.1 %) nasal spray; Commonly known as: Astelin   Baqsimi 3 mg/actuation spray,non-aerosol; Generic drug: glucagon; USE   ONE SPRAY IN THE NOSE AS NEEDED FOR LOW BLOOD SUGAR  MAY REPEAT AFTER 15   MINUTES USING A NEW DEVICE IF NO RESPONSE   BD Ultra-Fine Mini Pen Needle 31 gauge x 3/16\" needle; Generic drug: pen   needle, diabetic   carboxymethylcellulose 1 % ophthalmic solution dropperette; Commonly   known as: Refresh Celluvisc   cholecalciferol 5,000 Units tablet; Commonly known as: Vitamin D-3   Dexcom G7  misc; Generic drug: blood-glucose meter,continuous   DEXCOM G7 SENSOR MISC   disposable gloves misc; Commonly " known as: Disposable Latex-Free Gloves;   Use as needed   divalproex 500 mg 24 hr tablet; Commonly known as: Depakote ER   EPINEPHrine 0.3 mg/0.3 mL injection syringe; Commonly known as: Epipen;   INJECT INTRAMUSCULARLY ONCE AS NEEDED FOR ANAPHYLAXIS   ezetimibe 10 mg tablet; Commonly known as: Zetia; Take 1 tablet (10 mg)   by mouth once daily.   folic acid 1 mg tablet; Commonly known as: Folvite; Take 1 tablet (1 mg)   by mouth once daily.   furosemide 20 mg tablet; Commonly known as: Lasix; Take 1 tablet (20 mg)   by mouth 2 times a day.   gloves, latex with aloe vera misc; Large size gloves   immune globulin (human) infusion; Commonly known as: Gammagard   insulin lispro 100 unit/mL injection; Commonly known as: HumaLOG KwikPen   Insulin; Use as directed to give up to 100 units a day   ipratropium-albuteroL 0.5-2.5 mg/3 mL nebulizer solution; Commonly known   as: Duo-Neb   KlonoPIN 0.5 mg tablet; Generic drug: clonazePAM   * lacosamide 200 mg tablet tablet; Commonly known as: Vimpat   * lacosamide 50 mg tablet; Commonly known as: Vimpat   levETIRAcetam 750 mg tablet; Commonly known as: Keppra   levothyroxine 50 mcg tablet; Commonly known as: Synthroid, Levoxyl; Take   1.5 tablets (75 mcg) by mouth once daily in the morning. Take before   meals.   * miconazole 2 % powder; Commonly known as: Micotin   * miconazole 2 % cream; Commonly known as: Micotin   miscellaneous medical supply misc; Bath Wipes  fragrance free   moisturizing mouth solution; Commonly known as: Biotene Oral Dry Mouth   montelukast 10 mg tablet; Commonly known as: Singulair; Take 1 tablet   (10 mg) by mouth once daily at bedtime.   Motegrity 2 mg tablet; Generic drug: prucalopride   nasal spray Nayzilam 5 mg/spray (0.1 mL) spray,non-aerosol; Generic   drug: midazolam   ondansetron ODT 4 mg disintegrating tablet; Commonly known as:   Zofran-ODT; Take 1 tablet (4 mg) by mouth every 8 hours if needed for   nausea or vomiting.   OneTouch Delica Plus  Lancet 33 gauge misc; Generic drug: lancets   OneTouch Verio test strips strip; Generic drug: blood sugar diagnostic   polyethylene glycol 17 gram/dose powder; Commonly known as: Glycolax,   Miralax   propranolol LA 60 mg 24 hr capsule; Commonly known as: Inderal LA; Take   1 capsule (60 mg) by mouth once daily. Do not crush, chew, or split.   Protonix 40 mg EC tablet; Generic drug: pantoprazole   Reyvow 100 mg tablet; Generic drug: lasmiditan; Take 100 mg by mouth if   needed (migraine). Do not drive in 8 hours of taking this medicine.   Maximum one dose per 24 hours.   rOPINIRole 0.5 mg tablet; Commonly known as: Requip   senna 8.6 mg tablet; Generic drug: sennosides   tiZANidine 2 mg tablet; Commonly known as: Zanaflex   Ubrelvy 100 mg tablet tablet; Generic drug: ubrogepant; Take 1 tablet   (100 mg) by mouth if needed (migraine). May repeat in 2 hours for max of   200mg per 24 hours.   Viactiv 650 mg-12.5 mcg-40 mcg chewable tablet; Generic drug:   calcium-vitamin D3-vitamin K   ZINC ORAL  * This list has 4 medication(s) that are the same as other medications   prescribed for you. Read the directions carefully, and ask your doctor or   other care provider to review them with you.     STOP taking these medications     diphenhydrAMINE 12.5 mg/5 mL liquid; Commonly known as: BENADryl   promethazine 12.5 mg tablet; Commonly known as: Phenergan       Outpatient Follow-Up  Future Appointments   Date Time Provider Department [place]   11/20/2024 10:00 AM NEUROSURGERY [facility] NURSE [id number] [facility]   11/26/2024  2:00 PM POR ULTRASOUND 3 [facility]   11/26/2024  3:15 PM POR CT 1 [facility]   11/27/2024  8:15 AM KUMAR PAIN PROCEDURE RM1 [facility]   12/5/2024  3:45 PM Rob Mederos MD PhD [id number] [facility]   12/16/2024  2:30 PM Avelino Tafoya MD [id number] [facility]   12/20/2024 11:30 AM Loyda FitzpatrickD [id number] [facility]   1/7/2025  4:00 PM Isidoro Mensah DO [id number] [facility]   4/3/2025 12:00  PM Marah Felix MD [id number] [facility]       Eder Melissa MD

## 2024-11-16 NOTE — SIGNIFICANT EVENT
Rapid Response Nurse Note: RADAR alert: 6    Pager time:   Arrival time:   Event end time:   Location: [place] [id number]  [x] Triage by phone or secure messaging    Rapid response initiated by:  [] Rapid response RN [] Family [] Nursing Supervisor [] Physician   [x] RADAR auto page [] Sepsis auto-page [] RN [] RT   [] NP/PA [] Other:     Primary reason for call:   [] BAT [] New CPAP/BiPAP [] Bleeding [] Change in mental status   [] Chest pain [] Code blue [] FiO2 >/= 50% [] HR </= 40 bpm   [] HR >/= 130 bpm [] Hyperglycemia [] Hypoglycemia [x] RADAR    [] RR </= 8 bpm [] RR >/= 30 bpm [] SBP </= 90 mmHg [] SpO2 < 90%   [] Seizure [] Sepsis [] Shortness of breath  [] Staff concern: see comments     Initial VS and/or RADAR VS: T 36.3 °C; ; RR 18; BP 98/59; SPO2 97% on supplemental oxygen.    Interventions:  [x] None [] ABG/VBG [] Assist w/ICU transfer [] BAT paged    [] Bag mask [] Blood [] Cardioversion [] Code Blue   [] Code blue for intubation [] Code status changed [] Chest x-ray [] EKG   [] IV fluid/bolus [] KUB x-ray [] Labs/cultures [] Medication   [] Nebulizer treatment [] NIPPV (CPAP/BiPAP) [] Oxygen [] Oral airway   [] Peripheral IV [] Palliative care consult [] CT/MRI [] Sepsis protocol    [] Suctioned [] Other:     Outcome:  [] Coded and  [] Code blue for intubation [] Coded and transferred to ICU []  on division   [x] Remained on division (no change) [] Remained on division + additional monitoring [] Remained in ED [] Transferred to ED   [] Transferred to ICU [] Transferred to inpatient status [] Transferred for interventions (procedure) [] Transferred to ICU stepdown    [] Transferred to surgery [] Transferred to telemetry [] Sepsis protocol [] STEMI protocol   [] Stroke protocol [x] Bedside nurse instructed to page rapid response for any concerns or acute change in condition/VS     Additional Comments: Reviewed above VS with bedside RN via secure messaging platform.  Per report,  patient drowsy after surgery earlier today.  However, patient has been up ambulating since then.  Currently, no concerns per bedside RN.  Staff to page rapid response for any concerns or acute change in condition or VS.

## 2024-11-16 NOTE — NURSING NOTE
Patient discharged to home with . Discharge instructions reviewed with patient and all questions answered. All belongings are with patient. IV R chest port de-accessed.

## 2024-11-17 LAB
BACTERIA CSF CULT: NORMAL
BLOOD EXPIRATION DATE: NORMAL
DISPENSE STATUS: NORMAL
GRAM STN SPEC: NORMAL
GRAM STN SPEC: NORMAL
PRODUCT BLOOD TYPE: 6200
PRODUCT CODE: NORMAL
UNIT ABO: NORMAL
UNIT NUMBER: NORMAL
UNIT RH: NORMAL
UNIT VOLUME: 350
XM INTEP: NORMAL

## 2024-11-17 NOTE — ANESTHESIA POSTPROCEDURE EVALUATION
Patient: Jonathan Ayala    Procedure Summary       Date: 11/15/24 Room / Location: [facility] OR 25 / [facility] [place]    Anesthesia Start: 1242 Anesthesia Stop: 1506    Procedure: Revision Shunt Ventricular Peritoneal (Right: Head) Diagnosis:       Postoperative wound dehiscence, initial encounter      (Postoperative wound dehiscence, initial encounter [T81.31XA])    Surgeons: Avelino Tafoya MD Responsible Provider: Rakan Canales DO    Anesthesia Type: general ASA Status: 3            Anesthesia Type: general    Vitals Value Taken Time   /60 11/15/24 1615   Temp 36.6 °C (97.9 °F) 11/15/24 1615   Pulse 107 11/15/24 1620   Resp 18 11/15/24 1620   SpO2 95 % 11/15/24 1620   Vitals shown include unfiled device data.    Anesthesia Post Evaluation    Patient location during evaluation: PACU  Patient participation: complete - patient participated  Level of consciousness: awake  Pain management: adequate  Multimodal analgesia pain management approach  Airway patency: patent  Two or more strategies used to mitigate risk of obstructive sleep apnea  Cardiovascular status: acceptable  Respiratory status: face mask  Hydration status: acceptable  Postoperative Nausea and Vomiting: none        There were no known notable events for this encounter.

## 2024-11-18 ENCOUNTER — PATIENT OUTREACH (OUTPATIENT)
Dept: PRIMARY CARE | Facility: CLINIC | Age: 49
End: 2024-11-18
Payer: MEDICAID

## 2024-11-18 NOTE — PROGRESS NOTES
Discharge Facility: [facility]   Discharge Diagnosis: Postoperative wound dehiscence, initial encounter; Type 2 diabetes mellitus without complication, unspecified whether long term insulin use; Acute post-operative pain   Admission Date: 11/15/2024   Discharge Date: 11/16/2024     PCP Appointment Date: TBD-office tasked to arrange fup with PCP as needed  Specialist Appointment Date: 11/20 neurosurgery; 12/16 neurosurgery  Hospital Encounter and Summary Linked: Yes  See discharge assessment below for further details  Engagement  Call Start Time: 1327 (11/18/2024  1:51 PM)    Medications  Medications reviewed with patient/caregiver?: Yes (discussed meds with patient) (11/18/2024  1:51 PM)  Is the patient having any side effects they believe may be caused by any medication additions or changes?: No (11/18/2024  1:51 PM)  Does the patient have all medications ordered at discharge?: Yes (11/18/2024  1:51 PM)  Care Management Interventions: No intervention needed (11/18/2024  1:51 PM)  Prescription Comments: see med list (oxycodone; insulin glargine; fluticasone) (11/18/2024  1:51 PM)  Is the patient taking all medications as directed (includes completed medication regime)?: Yes (11/18/2024  1:51 PM)  Care Management Interventions: Provided patient education (11/18/2024  1:51 PM)    Appointments  Does the patient have a primary care provider?: Yes (11/18/2024  1:51 PM)  Care Management Interventions: Educated patient on importance of making appointment; Advised patient to make appointment (11/18/2024  1:51 PM)  Has the patient kept scheduled appointments due by today?: Yes (11/18/2024  1:51 PM)  Care Management Interventions: Advised to schedule with specialist (11/18/2024  1:51 PM)    Self Management  What is the home health agency?: denies need- there to help support (11/18/2024  1:51 PM)  What Durable Medical Equipment (DME) was ordered?: n/a (11/18/2024  1:51 PM)    Patient Teaching  Does the  patient have access to their discharge instructions?: Yes (11/18/2024  1:51 PM)  Care Management Interventions: Reviewed instructions with patient (11/18/2024  1:51 PM)  What is the patient's perception of their health status since discharge?: Improving (11/18/2024  1:51 PM)  Is the patient/caregiver able to teach back the hierarchy of who to call/visit for symptoms/problems? PCP, Specialist, Home Health nurse, Urgent Care, ED, 911: Yes (11/18/2024  1:51 PM)  Patient/Caregiver Education Comments: Successful transition of care outreach with the patient. The patient reports doing well at home since discharge. New meds/changes were reviewed with the patient during outreach. The patient denies CP/SOB. States she does feel like she has been more confused this time around after her revision and more lethargic. Did note that these can be common symptoms after a revision but instructed patient to fup with surgeon on her symptoms. Also informed patient that if she has any worsening symptoms to return to the ER. Patient verbalized understanding but states overall she does feel like she is improving. Patient denies further discharge questions/concerns/needs during outreach call. Emphasized that follow-up appts are needed after discharge with PCP and reviewed needed follow-ups with any specialties to assess response to treatment from hospitalization. The patient is aware of my availability for non-emergent concerns. Contact information was provided to the patient. (11/18/2024  1:51 PM)

## 2024-11-20 ENCOUNTER — APPOINTMENT (OUTPATIENT)
Dept: NEUROSURGERY | Facility: HOSPITAL | Age: 49
End: 2024-11-20
Payer: MEDICAID

## 2024-11-22 ENCOUNTER — TELEPHONE (OUTPATIENT)
Dept: PRIMARY CARE | Facility: CLINIC | Age: 49
End: 2024-11-22
Payer: MEDICAID

## 2024-11-22 LAB
BACTERIA CSF CULT: NORMAL
GRAM STN SPEC: NORMAL
GRAM STN SPEC: NORMAL

## 2024-11-22 NOTE — TELEPHONE ENCOUNTER
[facility], needs paperwork for new mattress for her hospital bed as well as a new manual wheelchair. Please fax to Viktoria from [facility] at for [phone]

## 2024-11-25 ENCOUNTER — APPOINTMENT (OUTPATIENT)
Dept: PRIMARY CARE | Facility: CLINIC | Age: 49
End: 2024-11-25
Payer: MEDICAID

## 2024-11-26 ENCOUNTER — APPOINTMENT (OUTPATIENT)
Dept: RADIOLOGY | Facility: HOSPITAL | Age: 49
End: 2024-11-26
Payer: MEDICAID

## 2024-11-26 ENCOUNTER — PATIENT MESSAGE (OUTPATIENT)
Dept: ENDOCRINOLOGY | Facility: CLINIC | Age: 49
End: 2024-11-26
Payer: MEDICAID

## 2024-11-27 ENCOUNTER — APPOINTMENT (OUTPATIENT)
Facility: HOSPITAL | Age: 49
End: 2024-11-27
Payer: MEDICAID

## 2024-11-27 ENCOUNTER — CLINICAL SUPPORT (OUTPATIENT)
Dept: NEUROSURGERY | Facility: HOSPITAL | Age: 49
End: 2024-11-27
Payer: MEDICAID

## 2024-11-27 NOTE — PROGRESS NOTES
Saw Pt. in clinic for wound check her shunt incision was well approximated with out redness, swelling or drainage. I removed sutures in a clean fashion. I discussed wound care and pt and family verbalized understanding. She will see Dr. Tafoya in  few weeks.

## 2024-11-29 ENCOUNTER — PATIENT MESSAGE (OUTPATIENT)
Dept: ENDOCRINOLOGY | Facility: CLINIC | Age: 49
End: 2024-11-29
Payer: MEDICAID

## 2024-11-29 DIAGNOSIS — E11.65 TYPE 2 DIABETES MELLITUS WITH HYPERGLYCEMIA, WITH LONG-TERM CURRENT USE OF INSULIN: ICD-10-CM

## 2024-11-29 DIAGNOSIS — Z79.4 TYPE 2 DIABETES MELLITUS WITH HYPERGLYCEMIA, WITH LONG-TERM CURRENT USE OF INSULIN: ICD-10-CM

## 2024-11-29 DIAGNOSIS — Z79.4 TYPE 2 DIABETES MELLITUS WITH HYPERGLYCEMIA, WITH LONG-TERM CURRENT USE OF INSULIN: Primary | ICD-10-CM

## 2024-11-29 DIAGNOSIS — E11.65 TYPE 2 DIABETES MELLITUS WITH HYPERGLYCEMIA, WITH LONG-TERM CURRENT USE OF INSULIN: Primary | ICD-10-CM

## 2024-11-29 RX ORDER — PEN NEEDLE, DIABETIC 30 GX3/16"
NEEDLE, DISPOSABLE MISCELLANEOUS
Qty: 100 EACH | Refills: 11 | Status: SHIPPED | OUTPATIENT
Start: 2024-11-29

## 2024-11-29 RX ORDER — PEN NEEDLE, DIABETIC 31 GX5/16"
NEEDLE, DISPOSABLE MISCELLANEOUS
Qty: 100 EACH | Refills: 1 | Status: SHIPPED | OUTPATIENT
Start: 2024-11-29

## 2024-12-01 DIAGNOSIS — J45.40 MODERATE PERSISTENT ALLERGIC ASTHMA (HHS-HCC): ICD-10-CM

## 2024-12-02 RX ORDER — MONTELUKAST SODIUM 10 MG/1
10 TABLET ORAL NIGHTLY
Qty: 90 TABLET | Refills: 0 | Status: SHIPPED | OUTPATIENT
Start: 2024-12-02

## 2024-12-04 ENCOUNTER — APPOINTMENT (OUTPATIENT)
Dept: OPHTHALMOLOGY | Facility: CLINIC | Age: 49
End: 2024-12-04
Payer: MEDICAID

## 2024-12-05 ENCOUNTER — PATIENT OUTREACH (OUTPATIENT)
Dept: PRIMARY CARE | Facility: CLINIC | Age: 49
End: 2024-12-05

## 2024-12-05 ENCOUNTER — APPOINTMENT (OUTPATIENT)
Dept: OPHTHALMOLOGY | Facility: CLINIC | Age: 49
End: 2024-12-05
Payer: MEDICAID

## 2024-12-05 DIAGNOSIS — R56.9 FOCAL SEIZURES (MULTI): ICD-10-CM

## 2024-12-05 DIAGNOSIS — D83.9 CVID (COMMON VARIABLE IMMUNODEFICIENCY): ICD-10-CM

## 2024-12-05 DIAGNOSIS — M35.01 SJOGREN'S SYNDROME WITH KERATOCONJUNCTIVITIS SICCA (MULTI): ICD-10-CM

## 2024-12-05 PROCEDURE — RXMED WILLOW AMBULATORY MEDICATION CHARGE

## 2024-12-06 ENCOUNTER — TELEMEDICINE (OUTPATIENT)
Dept: PRIMARY CARE | Facility: CLINIC | Age: 49
End: 2024-12-06
Payer: MEDICAID

## 2024-12-06 VITALS
WEIGHT: 250 LBS | HEART RATE: 95 BPM | BODY MASS INDEX: 46.01 KG/M2 | DIASTOLIC BLOOD PRESSURE: 83 MMHG | HEIGHT: 62 IN | SYSTOLIC BLOOD PRESSURE: 137 MMHG

## 2024-12-06 DIAGNOSIS — E11.65 TYPE 2 DIABETES MELLITUS WITH HYPERGLYCEMIA, WITH LONG-TERM CURRENT USE OF INSULIN: Primary | ICD-10-CM

## 2024-12-06 DIAGNOSIS — G43.109 COMPLICATED MIGRAINE: ICD-10-CM

## 2024-12-06 DIAGNOSIS — K31.84 DIABETIC GASTROPARESIS ASSOCIATED WITH TYPE 2 DIABETES MELLITUS (MULTI): ICD-10-CM

## 2024-12-06 DIAGNOSIS — E66.01 CLASS 3 SEVERE OBESITY DUE TO EXCESS CALORIES WITH SERIOUS COMORBIDITY AND BODY MASS INDEX (BMI) OF 40.0 TO 44.9 IN ADULT: ICD-10-CM

## 2024-12-06 DIAGNOSIS — T38.0X5A ADRENAL INSUFFICIENCY DUE TO STEROID WITHDRAWAL (MULTI): ICD-10-CM

## 2024-12-06 DIAGNOSIS — M35.01 SJOGREN'S SYNDROME WITH KERATOCONJUNCTIVITIS SICCA (MULTI): ICD-10-CM

## 2024-12-06 DIAGNOSIS — F33.42 MAJOR DEPRESSIVE DISORDER, RECURRENT, IN FULL REMISSION WITH ANXIOUS DISTRESS (CMS-HCC): ICD-10-CM

## 2024-12-06 DIAGNOSIS — G25.81 RESTLESS LEGS SYNDROME: ICD-10-CM

## 2024-12-06 DIAGNOSIS — G40.909 SEIZURE DISORDER (MULTI): ICD-10-CM

## 2024-12-06 DIAGNOSIS — G47.33 OSA ON CPAP: ICD-10-CM

## 2024-12-06 DIAGNOSIS — D83.9 CVID (COMMON VARIABLE IMMUNODEFICIENCY): ICD-10-CM

## 2024-12-06 DIAGNOSIS — G93.2 BENIGN INTRACRANIAL HYPERTENSION: ICD-10-CM

## 2024-12-06 DIAGNOSIS — J45.40 MODERATE PERSISTENT ALLERGIC ASTHMA (HHS-HCC): ICD-10-CM

## 2024-12-06 DIAGNOSIS — E27.3 ADRENAL INSUFFICIENCY DUE TO STEROID WITHDRAWAL (MULTI): ICD-10-CM

## 2024-12-06 DIAGNOSIS — Z79.4 TYPE 2 DIABETES MELLITUS WITH HYPERGLYCEMIA, WITH LONG-TERM CURRENT USE OF INSULIN: Primary | ICD-10-CM

## 2024-12-06 DIAGNOSIS — E11.43 DIABETIC GASTROPARESIS ASSOCIATED WITH TYPE 2 DIABETES MELLITUS (MULTI): ICD-10-CM

## 2024-12-06 DIAGNOSIS — E66.813 CLASS 3 SEVERE OBESITY DUE TO EXCESS CALORIES WITH SERIOUS COMORBIDITY AND BODY MASS INDEX (BMI) OF 40.0 TO 44.9 IN ADULT: ICD-10-CM

## 2024-12-06 DIAGNOSIS — K58.0 IRRITABLE BOWEL SYNDROME WITH DIARRHEA: ICD-10-CM

## 2024-12-06 PROBLEM — H53.9 VISION CHANGES: Status: RESOLVED | Noted: 2024-09-17 | Resolved: 2024-12-06

## 2024-12-06 PROBLEM — T81.30XA WOUND DEHISCENCE: Status: RESOLVED | Noted: 2024-11-07 | Resolved: 2024-12-06

## 2024-12-06 PROBLEM — T88.4XXA DIFFICULT INTUBATION: Status: RESOLVED | Noted: 2024-09-23 | Resolved: 2024-12-06

## 2024-12-06 PROBLEM — E88.9: Status: RESOLVED | Noted: 2024-06-18 | Resolved: 2024-12-06

## 2024-12-06 PROBLEM — H54.7 VISION LOSS: Status: RESOLVED | Noted: 2024-10-26 | Resolved: 2024-12-06

## 2024-12-06 PROBLEM — S82.51XA AVULSION FRACTURE OF MEDIAL MALLEOLUS OF RIGHT TIBIA: Status: RESOLVED | Noted: 2024-06-18 | Resolved: 2024-12-06

## 2024-12-06 PROBLEM — T81.31XA WOUND DEHISCENCE, SURGICAL: Status: RESOLVED | Noted: 2024-11-15 | Resolved: 2024-12-06

## 2024-12-06 PROBLEM — G44.209 TENSION HEADACHE: Status: RESOLVED | Noted: 2023-08-29 | Resolved: 2024-12-06

## 2024-12-06 PROBLEM — T81.31XA POSTOPERATIVE WOUND DEHISCENCE, INITIAL ENCOUNTER: Status: RESOLVED | Noted: 2024-11-15 | Resolved: 2024-12-06

## 2024-12-06 PROCEDURE — 3079F DIAST BP 80-89 MM HG: CPT | Performed by: INTERNAL MEDICINE

## 2024-12-06 PROCEDURE — 99417 PROLNG OP E/M EACH 15 MIN: CPT | Performed by: INTERNAL MEDICINE

## 2024-12-06 PROCEDURE — 3051F HG A1C>EQUAL 7.0%<8.0%: CPT | Performed by: INTERNAL MEDICINE

## 2024-12-06 PROCEDURE — 99215 OFFICE O/P EST HI 40 MIN: CPT | Performed by: INTERNAL MEDICINE

## 2024-12-06 PROCEDURE — 3049F LDL-C 100-129 MG/DL: CPT | Performed by: INTERNAL MEDICINE

## 2024-12-06 PROCEDURE — 3008F BODY MASS INDEX DOCD: CPT | Performed by: INTERNAL MEDICINE

## 2024-12-06 PROCEDURE — 3075F SYST BP GE 130 - 139MM HG: CPT | Performed by: INTERNAL MEDICINE

## 2024-12-06 RX ORDER — FUROSEMIDE 20 MG/1
20 TABLET ORAL 2 TIMES DAILY
Qty: 180 TABLET | Refills: 3 | Status: SHIPPED | OUTPATIENT
Start: 2024-12-06

## 2024-12-06 RX ORDER — FOLIC ACID 1 MG/1
1 TABLET ORAL DAILY
Qty: 90 TABLET | Refills: 3 | Status: SHIPPED | OUTPATIENT
Start: 2024-12-06

## 2024-12-06 ASSESSMENT — ENCOUNTER SYMPTOMS
PHOTOPHOBIA: 1
WEAKNESS: 1
HEADACHES: 1
SEIZURES: 1

## 2024-12-06 NOTE — ASSESSMENT & PLAN NOTE
Continue Motegrity 2 mg tablet 1 tablet daily continue polyethylene glycol 17 g daily  Orders:    folic acid (Folvite) 1 mg tablet; Take 1 tablet (1 mg) by mouth once daily.

## 2024-12-06 NOTE — ASSESSMENT & PLAN NOTE
Stable continue amitriptyline 100 mg nightly with clonazepam 0.5 mg twice a day managed by psychiatrist

## 2024-12-06 NOTE — ASSESSMENT & PLAN NOTE
Reevaluate when she returns continue to encourage diet as tolerated.  Patient with disability with gait instability due to multiple causes along with diabetic neuropathy written for new mattress for hospital bed and manual use wheelchair at home to be covered by [facility] medical fax [phone] patient will also meet qualifications for walk-in shower due to her comorbid disability and high risk of injuries and falls

## 2024-12-06 NOTE — PROGRESS NOTES
"Subjective   Patient ID: Jonathan Ayala is a 48 y.o. female who presents for Virtual Visit (Patient is in the need of documentation for wheelchair and a hospital mattress, and walk in shower./Walker in shower goes home care program./[facility] ).    HPI     Review of Systems    Objective   /83   Pulse 95   Ht 1.575 m (5' 2\")   Wt 113 kg (250 lb)   BMI 45.73 kg/m²     Physical Exam    Assessment/Plan          "

## 2024-12-06 NOTE — PROGRESS NOTES
Subjective   Reason for Visit: Jonathan Ayala is an 48 y.o. female here for a fu  visit.   Virtual or Telephone Consent    An interactive audio and video telecommunication system which permits real time communications between the patient (at the originating site) and provider (at the distant site) was utilized to provide this telehealth service.   Time spent 58 minutes  Verbal consent was requested and obtained from Jonathan Ayala on this date, 12/06/24 for a telehealth visit.   Past Medical, Surgical, and Family History reviewed and updated in chart.    Reviewed all medications by prescribing practitioner or clinical pharmacist (such as prescriptions, OTCs, herbal therapies and supplements) and documented in the medical record.    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 Mederos and found to 10 days of total right eye blindness, SS intact, 10/23 MRI brain/orbit neg, 10/25 s/p LP (OP28, CP9), 10/29 p/w HA and visual changes, 10/30 s/p R VPS exploration w abdominal catheter repositioning w improvement in distal runnoff, 11/6 p/w min clear incisional drainage, min decr in vision, incision oversewn with no further leakage noted.   11/15 she presented to the ED with fluid leaking from incision site. Admitted to Neurosurgery service for further evaluation and  "treatment.  Shunt tap and CSF sent     11/16 s/p VPS exploration, replacement of fractured valve (Certas with antisiphon at 5)     On the day of discharge, the patient was seen and evaluated by the neurosurgery team and deemed suitable for discharge to home.            Patient Care Team:  Isidoro Mensah DO as PCP - General  RAUL Retana-CNP as PCP - [facility] Medicaid PCP  Zari Jaquez RN as Care Manager (Case Management)  Sanket Leach RN as Care Manager (Case Management)     Review of Systems   Eyes:  Positive for photophobia and visual disturbance.   Neurological:  Positive for seizures, weakness and headaches.       Objective   Vitals:  /83   Pulse 95   Ht 1.575 m (5' 2\")   Wt 113 kg (250 lb)   BMI 45.73 kg/m²       Physical Exam  Constitutional:       General: She is not in acute distress.     Appearance: Normal appearance. She is not ill-appearing, toxic-appearing or diaphoretic.   HENT:      Head: Normocephalic and atraumatic.   Neurological:      General: No focal deficit present.      Mental Status: She is alert and oriented to person, place, and time. Mental status is at baseline.         Assessment & Plan  Irritable bowel syndrome with diarrhea  Continue Motegrity 2 mg tablet 1 tablet daily continue polyethylene glycol 17 g daily  Orders:    folic acid (Folvite) 1 mg tablet; Take 1 tablet (1 mg) by mouth once daily.    Benign intracranial hypertension  Status post  shunt stable healing well continue to follow with neurosurgery refilled furosemide 20 mg twice a day reevaluate with blood work next month  Orders:    furosemide (Lasix) 20 mg tablet; Take 1 tablet (20 mg) by mouth 2 times a day.    Diabetic gastroparesis associated with type 2 diabetes mellitus (Multi)  Continue with Motegrity 2 mg daily       CVID (common variable immunodeficiency)  Continues to receive Gammagard 60 mg every 14 days with Solu-Cortef IV and Benadryl continue to follow immunoglobin levels to reduce " risk of infection       Major depressive disorder, recurrent, in full remission with anxious distress (CMS-HCC)  Stable continue amitriptyline 100 mg nightly with clonazepam 0.5 mg twice a day managed by psychiatrist       Class 3 severe obesity due to excess calories with serious comorbidity and body mass index (BMI) of 40.0 to 44.9 in adult  Reevaluate when she returns continue to encourage diet as tolerated.  Patient with disability with gait instability due to multiple causes along with diabetic neuropathy written for new mattress for hospital bed and manual use wheelchair at home to be covered by [facility] medical fax [phone] patient will also meet qualifications for walk-in shower due to her comorbid disability and high risk of injuries and falls       Type 2 diabetes mellitus with hyperglycemia, with long-term current use of insulin  Follows with endocrine specialist on Toujeo insulin 52 units daily using carbohydrate counting scale for meals 3 times a day averaging 20 units per meal monitor early afternoon hypoglycemia written for glucagon emergency kit and continue use nasal spray reevaluate A1c with next visit  Orders:    Disability Placard    glucagon (Glucagen) 1 mg injection; Inject 1 mg under the skin 1 time if needed for low blood sugar - see comments (hypoglycemia).    Adrenal insufficiency due to steroid withdrawal (Multi)  Continues on hydrocortisone 10 mg daily taken in the morning.       Sjogren's syndrome with keratoconjunctivitis sicca (Multi)  Stable at this time continue to monitor       Restless legs syndrome  Continue ropinirole 0.5 mg in the morning and 1 mg in the evening       Complicated migraine  Stable continues on Ubrelvy 100 mg as needed.  Continue lasmiditan 100 mg as needed refill folic acid continue propranolol LA 60 mg daily       Moderate persistent allergic asthma ([facility])  Symptoms controlled on Advair /21Micrograms 2 puffs twice a day with DuoNeb       BRENT on  CPAP  Compliant with regular use of AutoPap on a nightly basis refraining from using it until surgical wounds from  shunt have completely healed reevaluate 1 month       Seizure disorder (Multi)  Written for home health as well as [facility] medical for wheelchair and new mattress for hospital bed.  Continue levetiracetam 750 mg tablet 2 tablets daily Depakote  mg 1 tablet twice a day and lacosamide 250 mg twice a day

## 2024-12-06 NOTE — ASSESSMENT & PLAN NOTE
Continues to receive Gammagard 60 mg every 14 days with Solu-Cortef IV and Benadryl continue to follow immunoglobin levels to reduce risk of infection

## 2024-12-06 NOTE — ASSESSMENT & PLAN NOTE
[facility]  [id number] [facility] [address]  [phone]      Mouna Santamaria :2001 MRN:[id number]    2018 Time Session Began: 1100  Time Session Ended: 1150    Session Type:Family Therapy (72488)    Others Present: mother    Intervention: Behavioral, Cognitive    Suicide/Homicide/Violence Ideation: No    If Yes, explain: n/a    Current Outpatient Prescriptions   Medication Sig   • sertraline (ZOLOFT) 100 MG tablet Take 1 pill daily, PLUS an extra 1/2 pill, a total of  150 mg daily, the week before menses   • traZODone (DESYREL) 50 MG tablet Take 1 or 1 and 1/2 tablets by mouth about 30 minutes before bed for insomnia   • diclofenac (VOLTAREN) 25 MG EC tablet Take 1 tablet by mouth 3 times daily as needed (pain).   • diclofenac 2 % cream Apply 1-2 pumps to left leg and knee up to 4 times daily.     No current facility-administered medications for this visit.        Change in Medication(s) Reported: No  If Yes, explain: n/a    Patient/Family Education Provided: N/A  Patient/Family Displays Understanding: N/A    If No, explain: n/a    Chief complaint in patient's own words: \"I worried about seeing people I didn't want to see.\"    Progress Note containing chief complaint and symptoms/problems related to the complaint:    (Data/Action/Response/Plan)    D: Pt's mood euthymic, affect full and appropriate.  States mood has been \"good\" and stable.  Mother asked pt to talk about a recent family outing, prior to pt worried about running into people she didn't want to see. Pt described being anxious prior to the outing and when she did see the girl whom there was a cease and desist order against last school year, pt states she did acknowledge and talk to her and the girls' mother, along with pt's mother when they approached them.  Mother provided her praise and positive feedback for confronting instead of avoiding her fears.   Compliant with regular use of AutoPap on a nightly basis refraining from using it until surgical wounds from  shunt have completely healed reevaluate 1 month            A: She was able to identify her thoughts prior to the outing and the type of thinking errors she was making.  She also brought examples of other thinking errors she has engaged in since last seen.  Common patterns all black/white thinking and awfulizing.  Today pt, along with assistance from mother completed an exercise in session where she was asked to identify rationale from irrational thoughts and then change/challenge the thinking errors to create more rational thoughts.  She struggled with this exercise, having a difficult time identifying the thinking errors being made, but once done, she did a fair job being able to challenge and change them.  Mother provided good assistance and when pt was struggling, helped pt by presenting a situation pt has personally engaged in to help her identify the thinking errors on the worksheet.      P: She is asked to go over the worksheet again and this time not only identify the thinking error type, but why it is that type to increase her ability to hear her own thinking errors.  Mother is asked to allow pt to try to figure out on her own first.  She is to continue documenting her own thinking errors and bring to next session, and she will be taught how to use a thought log to challenge and change her unhelpful thoughts.      Need for Community Resources Assessed: No    Resources Needed: N/A    If Yes, what resources: n/a    Primary Diagnosis: Major Depressive Disorder, single, mild (improved); Social Anxiety Disorder (improving); Unspecified Anxiety Disorder; r/o Generalized Anxiety Disorder     Treatment Plan: See Treatment Plan    Discharge Plan: N/A    Next Appointment: 8/14/18  Lauren Orellana PSYD

## 2024-12-06 NOTE — ASSESSMENT & PLAN NOTE
Written for home health as well as [facility] medical for wheelchair and new mattress for hospital bed.  Continue levetiracetam 750 mg tablet 2 tablets daily Depakote  mg 1 tablet twice a day and lacosamide 250 mg twice a day

## 2024-12-06 NOTE — ASSESSMENT & PLAN NOTE
Follows with endocrine specialist on Toujeo insulin 52 units daily using carbohydrate counting scale for meals 3 times a day averaging 20 units per meal monitor early afternoon hypoglycemia written for glucagon emergency kit and continue use nasal spray reevaluate A1c with next visit  Orders:    Disability Placard    glucagon (Glucagen) 1 mg injection; Inject 1 mg under the skin 1 time if needed for low blood sugar - see comments (hypoglycemia).

## 2024-12-06 NOTE — ASSESSMENT & PLAN NOTE
Stable continues on Ubrelvy 100 mg as needed.  Continue lasmiditan 100 mg as needed refill folic acid continue propranolol LA 60 mg daily

## 2024-12-09 ENCOUNTER — HOSPITAL ENCOUNTER (INPATIENT)
Facility: HOSPITAL | Age: 49
LOS: 2 days | Discharge: HOME | End: 2024-12-11
Attending: EMERGENCY MEDICINE | Admitting: NEUROLOGICAL SURGERY
Payer: MEDICAID

## 2024-12-09 ENCOUNTER — APPOINTMENT (OUTPATIENT)
Dept: RADIOLOGY | Facility: HOSPITAL | Age: 49
End: 2024-12-09
Payer: MEDICAID

## 2024-12-09 DIAGNOSIS — T81.89XA PROBLEM INVOLVING SURGICAL INCISION: Primary | ICD-10-CM

## 2024-12-09 DIAGNOSIS — G93.2 IDIOPATHIC INTRACRANIAL HYPERTENSION: ICD-10-CM

## 2024-12-09 LAB
ABO GROUP (TYPE) IN BLOOD: NORMAL
ALBUMIN SERPL BCP-MCNC: 4 G/DL (ref 3.4–5)
ALP SERPL-CCNC: 94 U/L (ref 33–110)
ALT SERPL W P-5'-P-CCNC: 18 U/L (ref 7–45)
ANION GAP SERPL CALC-SCNC: 13 MMOL/L (ref 10–20)
ANTIBODY SCREEN: NORMAL
AST SERPL W P-5'-P-CCNC: 13 U/L (ref 9–39)
BASOPHILS # BLD AUTO: 0.02 X10*3/UL (ref 0–0.1)
BASOPHILS NFR BLD AUTO: 0.5 %
BILIRUB SERPL-MCNC: 0.2 MG/DL (ref 0–1.2)
BUN SERPL-MCNC: 19 MG/DL (ref 6–23)
CALCIUM SERPL-MCNC: 9.3 MG/DL (ref 8.6–10.6)
CHLORIDE SERPL-SCNC: 102 MMOL/L (ref 98–107)
CO2 SERPL-SCNC: 29 MMOL/L (ref 21–32)
CREAT SERPL-MCNC: 1.13 MG/DL (ref 0.5–1.05)
CRP SERPL-MCNC: 1 MG/DL
EGFRCR SERPLBLD CKD-EPI 2021: 61 ML/MIN/1.73M*2
EOSINOPHIL # BLD AUTO: 0.05 X10*3/UL (ref 0–0.7)
EOSINOPHIL NFR BLD AUTO: 1.2 %
ERYTHROCYTE [DISTWIDTH] IN BLOOD BY AUTOMATED COUNT: 15 % (ref 11.5–14.5)
GLUCOSE SERPL-MCNC: 253 MG/DL (ref 74–99)
HCT VFR BLD AUTO: 35.2 % (ref 36–46)
HGB BLD-MCNC: 11.4 G/DL (ref 12–16)
IMM GRANULOCYTES # BLD AUTO: 0.01 X10*3/UL (ref 0–0.7)
IMM GRANULOCYTES NFR BLD AUTO: 0.2 % (ref 0–0.9)
LYMPHOCYTES # BLD AUTO: 1.53 X10*3/UL (ref 1.2–4.8)
LYMPHOCYTES NFR BLD AUTO: 37 %
MAGNESIUM SERPL-MCNC: 1.97 MG/DL (ref 1.6–2.4)
MCH RBC QN AUTO: 27.5 PG (ref 26–34)
MCHC RBC AUTO-ENTMCNC: 32.4 G/DL (ref 32–36)
MCV RBC AUTO: 85 FL (ref 80–100)
MONOCYTES # BLD AUTO: 0.31 X10*3/UL (ref 0.1–1)
MONOCYTES NFR BLD AUTO: 7.5 %
NEUTROPHILS # BLD AUTO: 2.21 X10*3/UL (ref 1.2–7.7)
NEUTROPHILS NFR BLD AUTO: 53.6 %
NRBC BLD-RTO: 0 /100 WBCS (ref 0–0)
PLATELET # BLD AUTO: 285 X10*3/UL (ref 150–450)
POTASSIUM SERPL-SCNC: 3.8 MMOL/L (ref 3.5–5.3)
PROT SERPL-MCNC: 8 G/DL (ref 6.4–8.2)
RBC # BLD AUTO: 4.15 X10*6/UL (ref 4–5.2)
RH FACTOR (ANTIGEN D): NORMAL
SODIUM SERPL-SCNC: 140 MMOL/L (ref 136–145)
WBC # BLD AUTO: 4.1 X10*3/UL (ref 4.4–11.3)

## 2024-12-09 PROCEDURE — 85025 COMPLETE CBC W/AUTO DIFF WBC: CPT | Performed by: PHYSICIAN ASSISTANT

## 2024-12-09 PROCEDURE — 70250 X-RAY EXAM OF SKULL: CPT | Performed by: STUDENT IN AN ORGANIZED HEALTH CARE EDUCATION/TRAINING PROGRAM

## 2024-12-09 PROCEDURE — 71045 X-RAY EXAM CHEST 1 VIEW: CPT | Performed by: STUDENT IN AN ORGANIZED HEALTH CARE EDUCATION/TRAINING PROGRAM

## 2024-12-09 PROCEDURE — 86850 RBC ANTIBODY SCREEN: CPT

## 2024-12-09 PROCEDURE — 87040 BLOOD CULTURE FOR BACTERIA: CPT | Performed by: EMERGENCY MEDICINE

## 2024-12-09 PROCEDURE — 87040 BLOOD CULTURE FOR BACTERIA: CPT | Performed by: PHYSICIAN ASSISTANT

## 2024-12-09 PROCEDURE — 2500000004 HC RX 250 GENERAL PHARMACY W/ HCPCS (ALT 636 FOR OP/ED)

## 2024-12-09 PROCEDURE — 99285 EMERGENCY DEPT VISIT HI MDM: CPT | Mod: 25 | Performed by: EMERGENCY MEDICINE

## 2024-12-09 PROCEDURE — 74018 RADEX ABDOMEN 1 VIEW: CPT | Performed by: STUDENT IN AN ORGANIZED HEALTH CARE EDUCATION/TRAINING PROGRAM

## 2024-12-09 PROCEDURE — 83735 ASSAY OF MAGNESIUM: CPT | Performed by: PHYSICIAN ASSISTANT

## 2024-12-09 PROCEDURE — 85652 RBC SED RATE AUTOMATED: CPT | Performed by: STUDENT IN AN ORGANIZED HEALTH CARE EDUCATION/TRAINING PROGRAM

## 2024-12-09 PROCEDURE — 70450 CT HEAD/BRAIN W/O DYE: CPT

## 2024-12-09 PROCEDURE — 86140 C-REACTIVE PROTEIN: CPT | Performed by: EMERGENCY MEDICINE

## 2024-12-09 PROCEDURE — 2500000001 HC RX 250 WO HCPCS SELF ADMINISTERED DRUGS (ALT 637 FOR MEDICARE OP)

## 2024-12-09 PROCEDURE — 84702 CHORIONIC GONADOTROPIN TEST: CPT | Performed by: STUDENT IN AN ORGANIZED HEALTH CARE EDUCATION/TRAINING PROGRAM

## 2024-12-09 PROCEDURE — 80053 COMPREHEN METABOLIC PANEL: CPT | Performed by: PHYSICIAN ASSISTANT

## 2024-12-09 PROCEDURE — 85730 THROMBOPLASTIN TIME PARTIAL: CPT

## 2024-12-09 PROCEDURE — 99285 EMERGENCY DEPT VISIT HI MDM: CPT | Performed by: PHYSICIAN ASSISTANT

## 2024-12-09 PROCEDURE — 71045 X-RAY EXAM CHEST 1 VIEW: CPT

## 2024-12-09 PROCEDURE — 70450 CT HEAD/BRAIN W/O DYE: CPT | Performed by: RADIOLOGY

## 2024-12-09 PROCEDURE — 36415 COLL VENOUS BLD VENIPUNCTURE: CPT | Performed by: EMERGENCY MEDICINE

## 2024-12-09 PROCEDURE — RXMED WILLOW AMBULATORY MEDICATION CHARGE

## 2024-12-09 PROCEDURE — 1210000001 HC SEMI-PRIVATE ROOM DAILY

## 2024-12-09 RX ORDER — ONDANSETRON 4 MG/1
4 TABLET, FILM COATED ORAL EVERY 8 HOURS PRN
Status: DISCONTINUED | OUTPATIENT
Start: 2024-12-09 | End: 2024-12-11 | Stop reason: HOSPADM

## 2024-12-09 RX ORDER — POLYETHYLENE GLYCOL 3350 17 G/17G
17 POWDER, FOR SOLUTION ORAL 2 TIMES DAILY
Status: DISCONTINUED | OUTPATIENT
Start: 2024-12-09 | End: 2024-12-11 | Stop reason: HOSPADM

## 2024-12-09 RX ORDER — DIVALPROEX SODIUM 500 MG/1
500 TABLET, FILM COATED, EXTENDED RELEASE ORAL 2 TIMES DAILY
Status: DISCONTINUED | OUTPATIENT
Start: 2024-12-09 | End: 2024-12-11 | Stop reason: HOSPADM

## 2024-12-09 RX ORDER — FUROSEMIDE 20 MG/1
20 TABLET ORAL 2 TIMES DAILY
Status: DISCONTINUED | OUTPATIENT
Start: 2024-12-09 | End: 2024-12-11 | Stop reason: HOSPADM

## 2024-12-09 RX ORDER — NALOXONE HYDROCHLORIDE 0.4 MG/ML
0.2 INJECTION, SOLUTION INTRAMUSCULAR; INTRAVENOUS; SUBCUTANEOUS EVERY 5 MIN PRN
Status: DISCONTINUED | OUTPATIENT
Start: 2024-12-09 | End: 2024-12-11 | Stop reason: HOSPADM

## 2024-12-09 RX ORDER — FLUTICASONE PROPIONATE 50 MCG
2 SPRAY, SUSPENSION (ML) NASAL DAILY
Status: DISCONTINUED | OUTPATIENT
Start: 2024-12-10 | End: 2024-12-11 | Stop reason: HOSPADM

## 2024-12-09 RX ORDER — ACETAMINOPHEN 325 MG/1
650 TABLET ORAL EVERY 6 HOURS SCHEDULED
Status: DISCONTINUED | OUTPATIENT
Start: 2024-12-09 | End: 2024-12-10

## 2024-12-09 RX ORDER — FLUTICASONE FUROATE AND VILANTEROL 200; 25 UG/1; UG/1
1 POWDER RESPIRATORY (INHALATION)
Status: DISCONTINUED | OUTPATIENT
Start: 2024-12-10 | End: 2024-12-11

## 2024-12-09 RX ORDER — EPINEPHRINE 1 MG/ML
0.3 INJECTION, SOLUTION, CONCENTRATE INTRAVENOUS ONCE AS NEEDED
Status: DISCONTINUED | OUTPATIENT
Start: 2024-12-09 | End: 2024-12-11 | Stop reason: HOSPADM

## 2024-12-09 RX ORDER — AMITRIPTYLINE HYDROCHLORIDE 100 MG/1
100 TABLET ORAL NIGHTLY
Status: DISCONTINUED | OUTPATIENT
Start: 2024-12-09 | End: 2024-12-11 | Stop reason: HOSPADM

## 2024-12-09 RX ORDER — LEVETIRACETAM 250 MG/1
1500 TABLET ORAL 2 TIMES DAILY
Status: DISCONTINUED | OUTPATIENT
Start: 2024-12-09 | End: 2024-12-11 | Stop reason: HOSPADM

## 2024-12-09 RX ORDER — OXYCODONE HYDROCHLORIDE 5 MG/1
5 TABLET ORAL EVERY 4 HOURS PRN
Status: DISCONTINUED | OUTPATIENT
Start: 2024-12-09 | End: 2024-12-11 | Stop reason: HOSPADM

## 2024-12-09 RX ORDER — AMOXICILLIN 250 MG
2 CAPSULE ORAL 2 TIMES DAILY
Status: DISCONTINUED | OUTPATIENT
Start: 2024-12-09 | End: 2024-12-11 | Stop reason: HOSPADM

## 2024-12-09 RX ORDER — LACOSAMIDE 50 MG/1
50 TABLET ORAL 2 TIMES DAILY
Status: DISCONTINUED | OUTPATIENT
Start: 2024-12-09 | End: 2024-12-11 | Stop reason: HOSPADM

## 2024-12-09 RX ORDER — IPRATROPIUM BROMIDE AND ALBUTEROL SULFATE 2.5; .5 MG/3ML; MG/3ML
3 SOLUTION RESPIRATORY (INHALATION)
Status: DISCONTINUED | OUTPATIENT
Start: 2024-12-10 | End: 2024-12-11 | Stop reason: HOSPADM

## 2024-12-09 RX ORDER — CLONAZEPAM 0.5 MG/1
0.5 TABLET ORAL 2 TIMES DAILY
Status: DISCONTINUED | OUTPATIENT
Start: 2024-12-09 | End: 2024-12-11 | Stop reason: HOSPADM

## 2024-12-09 RX ORDER — LEVOTHYROXINE SODIUM 75 UG/1
75 TABLET ORAL
Status: DISCONTINUED | OUTPATIENT
Start: 2024-12-10 | End: 2024-12-11 | Stop reason: HOSPADM

## 2024-12-09 RX ORDER — LACOSAMIDE 100 MG/1
200 TABLET ORAL 2 TIMES DAILY
Status: DISCONTINUED | OUTPATIENT
Start: 2024-12-09 | End: 2024-12-11 | Stop reason: HOSPADM

## 2024-12-09 RX ORDER — EZETIMIBE 10 MG/1
10 TABLET ORAL DAILY
Status: DISCONTINUED | OUTPATIENT
Start: 2024-12-10 | End: 2024-12-11 | Stop reason: HOSPADM

## 2024-12-09 RX ORDER — OXYCODONE HYDROCHLORIDE 10 MG/1
10 TABLET ORAL EVERY 4 HOURS PRN
Status: DISCONTINUED | OUTPATIENT
Start: 2024-12-09 | End: 2024-12-11 | Stop reason: HOSPADM

## 2024-12-09 RX ORDER — PANTOPRAZOLE SODIUM 40 MG/1
40 TABLET, DELAYED RELEASE ORAL
Status: DISCONTINUED | OUTPATIENT
Start: 2024-12-10 | End: 2024-12-11 | Stop reason: HOSPADM

## 2024-12-09 RX ORDER — PROPRANOLOL HYDROCHLORIDE 60 MG/1
60 CAPSULE, EXTENDED RELEASE ORAL DAILY
Status: DISCONTINUED | OUTPATIENT
Start: 2024-12-10 | End: 2024-12-11 | Stop reason: HOSPADM

## 2024-12-09 RX ORDER — ONDANSETRON HYDROCHLORIDE 2 MG/ML
4 INJECTION, SOLUTION INTRAVENOUS EVERY 8 HOURS PRN
Status: DISCONTINUED | OUTPATIENT
Start: 2024-12-09 | End: 2024-12-11 | Stop reason: HOSPADM

## 2024-12-09 RX ORDER — SODIUM CHLORIDE 9 MG/ML
75 INJECTION, SOLUTION INTRAVENOUS CONTINUOUS
Status: ACTIVE | OUTPATIENT
Start: 2024-12-09 | End: 2024-12-10

## 2024-12-09 RX ORDER — MONTELUKAST SODIUM 10 MG/1
10 TABLET ORAL NIGHTLY
Status: DISCONTINUED | OUTPATIENT
Start: 2024-12-09 | End: 2024-12-11 | Stop reason: HOSPADM

## 2024-12-09 ASSESSMENT — PAIN SCALES - GENERAL: PAINLEVEL_OUTOF10: 7

## 2024-12-09 NOTE — ED PROVIDER NOTES
"Emergency Department Provider Note        History of Present Illness     History provided by: Patient  Limitations to History: None  External Records Reviewed with Brief Summary:  notes from recent ER visits and readmissions    HPI:  Jonathan Ayala is a 48 y.o. 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 Patient initially had her  shunt revision with Dr. Tafoya on October 30, she then represented to the ER on November 6 through 8 with concern for leakage from the incision site, patient then Nneka presented on November 15 through the 16th due to concerns for site infection.  Patient presents today because yesterday she was scratching her scalp and thinks she pulled out one of the stitches with her fingernail, she states that her  looked at her scalp and thought they saw exposed skull.  Patient states that they put a dressing over it and there was some pink blood as well as yellow discharge on it.  Patient states she has been having some increased headaches.  She also states she been having \"neurological symptoms\".  I asked patient what she means by this and she pulled a stack of papers out of her bag which is a list of things written by her nephew, pictures attached below.                        Physical Exam   Triage vitals:  T 36.6 °C (97.9 °F)  HR 90  /87  RR " 16  O2 98 %      Physical Exam  Vitals and nursing note reviewed.   Constitutional:       General: She is not in acute distress.     Appearance: Normal appearance. She is not toxic-appearing.   HENT:      Head: Normocephalic and atraumatic.      Nose: Nose normal.   Eyes:      Extraocular Movements: Extraocular movements intact.   Cardiovascular:      Rate and Rhythm: Normal rate and regular rhythm.   Pulmonary:      Effort: Pulmonary effort is normal.   Abdominal:      Palpations: Abdomen is soft.   Musculoskeletal:         General: Normal range of motion.      Cervical back: Normal range of motion and neck supple.   Skin:     General: Skin is warm and dry.      Comments: Surgical incision to the right frontal scalp appears to be healing well, there is a yellow scab anteriorly without exposed skull, no surrounding erythema or drainage.   Neurological:      General: No focal deficit present.      Mental Status: She is alert and oriented to person, place, and time.      GCS: GCS eye subscore is 4. GCS verbal subscore is 5. GCS motor subscore is 6.      Cranial Nerves: No cranial nerve deficit, dysarthria or facial asymmetry.      Sensory: Sensation is intact. No sensory deficit.      Motor: Motor function is intact. No weakness, tremor or seizure activity.   Psychiatric:         Mood and Affect: Mood normal.         Thought Content: Thought content normal.        CT head wo IV contrast   Final Result   * There is no measurable change compared to the previous exam.        MACRO:   none        Signed by: Elkin Joyce 12/9/2024 5:03 PM   Dictation workstation:   [id number]      XR shunt series   Final Result   1.  Right parietal ventriculostomy shunt catheter, as described   above. There is mild tortuosity of the catheter in the left abdomen   and pelvis, kinking in this region can not be excluded.   2.  Allowing for low lung volumes, no focal infiltrate or   pneumothorax.   3.  Nonobstructive bowel gas pattern.         Signed by: Boo Davis 12/9/2024 4:48 PM   Dictation workstation:   [id number]        Labs Reviewed   CBC WITH AUTO DIFFERENTIAL - Abnormal       Result Value    WBC 4.1 (*)     nRBC 0.0      RBC 4.15      Hemoglobin 11.4 (*)     Hematocrit 35.2 (*)     MCV 85      MCH 27.5      MCHC 32.4      RDW 15.0 (*)     Platelets 285      Neutrophils % 53.6      Immature Granulocytes %, Automated 0.2      Lymphocytes % 37.0      Monocytes % 7.5      Eosinophils % 1.2      Basophils % 0.5      Neutrophils Absolute 2.21      Immature Granulocytes Absolute, Automated 0.01      Lymphocytes Absolute 1.53      Monocytes Absolute 0.31      Eosinophils Absolute 0.05      Basophils Absolute 0.02     COMPREHENSIVE METABOLIC PANEL - Abnormal    Glucose 253 (*)     Sodium 140      Potassium 3.8      Chloride 102      Bicarbonate 29      Anion Gap 13      Urea Nitrogen 19      Creatinine 1.13 (*)     eGFR 61      Calcium 9.3      Albumin 4.0      Alkaline Phosphatase 94      Total Protein 8.0      AST 13      Bilirubin, Total 0.2      ALT 18     C-REACTIVE PROTEIN - Abnormal    C-Reactive Protein 1.00 (*)    MAGNESIUM - Normal    Magnesium 1.97     BLOOD CULTURE   URINALYSIS WITH REFLEX CULTURE AND MICROSCOPIC    Narrative:     The following orders were created for panel order Urinalysis with Reflex Culture and Microscopic.  Procedure                               Abnormality         Status                     ---------                               -----------         ------                     Urinalysis with Reflex C...[[id number]]                                                 Extra Urine Gray Tube[[id number]]                                                         Please view results for these tests on the individual orders.   TYPE AND SCREEN   COAGULATION SCREEN   URINALYSIS WITH REFLEX CULTURE AND MICROSCOPIC   EXTRA URINE GRAY TUBE     Diagnoses as of 12/09/24 1915   Problem involving surgical incision         Medical  "Decision Making & ED Course   Medical Decision Makin y.o. female Presents today for evaluation of believing she pulled her stitches out however when speaking with her further she does endorse some increased headaches and \"neurological symptoms\" as documented above.  Given that she has now been readmitted twice after her  shunt revision, I did consult neurosurgery, I did CT her head to evaluate for ventriculomegaly as well as obtain an x-ray of the shunt series, patient states she been more fatigued so I did obtain basic labs including CBC CMP magnesium. Patient is noted to be hyperglycemic with a glucose of 253, normal anion gap of 13, her CRP is 1, magnesium 1.97, CBC without leukocytosis or anemia, CT head unchanged from previous exam, x-ray of the shunts showed right parietal ventriculostomy shunt with mild tortuosity of the catheter in the left abdomen and pelvis kinking unable to be excluded.  Patient was seen and evaluated by neurosurgery who will admit patient to their service.  ----      Differential diagnoses considered include but are not limited to: infection, leak, shunt not working, etc.     Social Determinants of Health which Significantly Impact Care: None identified     EKG Independent Interpretation: EKG not obtained    Independent Result Review and Interpretation: Relevant laboratory and radiographic results were reviewed and independently interpreted by myself.  As necessary, they are commented on in the ED Course.    Chronic conditions affecting the patient's care: As documented above in [facility]    The patient was discussed with the following consultants/services:  neurosurgery team    Care Considerations: As documented above in [facility]    ED Course:  Diagnoses as of 24   Problem involving surgical incision     Disposition   As a result of their workup, the patient will require admission to the hospital.  The patient was informed of her diagnosis.  The patient was given the opportunity " to ask questions and I answered them. The patient agreed to be admitted to the hospital.    Procedures   Procedures    This was a shared visit with an ED attending.  The patient was seen and discussed with the ED attending    Natalie Corrales PA-C  Emergency Medicine       Natalie Corrales PA-C  12/09/24 1915

## 2024-12-09 NOTE — H&P
History Of Present Illness  Jonathan Ayala is a 48 y.o. 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 Mederos and found to 10 days of total right eye blindness, SS intact, 10/23 MRI brain/orbit neg, 10/25 s/p LP (OP28, CP9), 10/29 p/w HA and visual changes, 10/30 s/p R VPS exploration w abdominal catheter repositioning w improvement in distal runnoff, 11/6 p/w min clear incisional drainage, min decr in vision, incision oversewn with no further leakage noted, 11/15 she presented to the ED with fluid leaking from incision site and headache, 11/15 s/p RF shunt wound revision and fractured valve replacement, 12/9 p/w wound dehiscence/incisional leakage, CTH stable vents, SS is intact       The patient presented with concerns after accidentally removing stitches last night. The patient reported itching at the site and believes a fingernail caught it, causing it to pop out. The patient's spouse noted pink fluid on the dressing, along with yellow-clear drainage. The patient changed the dressing around lunchtime on the day of the appointment. Additionally, the patient experienced a severe migraine two days prior, characterized by the need to stay in the dark and episodes of vomiting, which were unresponsive to migraine medications. No headache now, vision has been stable. No fever.     Imaging is personally reviewed and [facility]  is notable. SS is intact        Past Medical History  Past Medical History:   Diagnosis Date    Abnormal findings on diagnostic imaging of other abdominal regions, including retroperitoneum 10/14/2020    Abnormal CT of the abdomen    Acquired deformity of nose 03/24/2022    Nasal deformity    Acute upper respiratory infection, unspecified 10/16/2019    Acute URI    Adrenal disease (Multi)     Allergic     Allergy status to unspecified drugs, medicaments and biological substances 05/22/2020    History of drug allergy    Allergy status to unspecified drugs, medicaments and biological substances 11/13/2020    History of adverse drug reaction    Anemia     Anxiety 2005    Asthma     Benign intracranial hypertension 01/27/2022    Pseudotumor cerebri    Bipolar disorder, unspecified (Multi)     Bipolar disease, chronic    Breast calcification, right 08/21/2018    Cellulitis of abdominal wall 09/28/2022    Cellulitis of right abdominal wall    Cervicalgia 07/01/2020    Cervicalgia of ftjcrdrj-cqqjvwv-elqfw region    Chronic maxillary sinusitis 01/04/2022    Chronic maxillary sinusitis    Chronic sialoadenitis 03/16/2020    Chronic sialoadenitis    COVID-19 01/06/2022    COVID-19 with multiple comorbidities    Decreased white blood cell count, unspecified 11/04/2019    Leukopenia    Disease of thyroid gland     Disturbances of salivary secretion 03/16/2020    Xerostomia    Dry eye syndrome of bilateral lacrimal glands 10/07/2022    Dry eyes, bilateral    Encounter for preprocedural cardiovascular examination 02/01/2022    Preoperative cardiovascular examination    Food additives allergy status 06/11/2020    Allergy to food dye    Fracture of nasal bones, initial encounter for closed fracture 03/03/2022    Closed fracture of nasal bone, initial encounter    GERD (gastroesophageal reflux disease) 15 years old    Granuloma of right orbit 10/07/2021    Inflammatory pseudotumor of right orbit    History of endometrial ablation  11/09/2017    Hyperlipidemia     Hypertension     Hyperthyroidism     Hypoglycemia     Hypothyroidism     Immunocompromised     Localized swelling, mass and lump, head 03/24/2022    Swollen nose    Major depressive disorder, recurrent, in full remission (CMS-[facility]) 10/07/2021    Depression, major, recurrent, in complete remission    Mammary duct ectasia of left breast 08/24/2022    Periductal mastitis of left breast    Meningitis ([facility]) 2008    Migraine     Nipple discharge 08/24/2022    Bloody discharge from left nipple    Ocular pain, right eye 10/07/2022    Pain in right eye    Optic atrophy     Other abnormal and inconclusive findings on diagnostic imaging of breast 07/06/2020    Other abnormal and inconclusive findings on diagnostic imaging of breast    Other anomalies of pupillary function 05/31/2019    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    Other chest pain 05/18/2020    Chest discomfort    Other conditions influencing health status 08/01/2022    History of cough    Other conditions influencing health status 08/03/2021    Chronic migraine    Other specified disorders of eustachian tube, left ear 11/18/2019    ETD (Eustachian tube dysfunction), left    Other specified disorders of nose and nasal sinuses 03/24/2022    Nasal dryness    Other specified disorders of nose and nasal sinuses 03/24/2022    Nasal crusting    Pelvic and perineal pain 07/06/2020    Pelvic pain    Personal history of other diseases of the circulatory system 04/07/2020    History of sinus tachycardia    Personal history of other diseases of the circulatory system 04/07/2020    History of abnormal electrocardiography    Personal history of other diseases of the circulatory system     History of Raynaud's syndrome    Personal history of other diseases of the digestive system     History of irritable bowel syndrome    Personal history of other diseases of the digestive system 03/02/2020    History of oral pain    Personal history  of other diseases of the musculoskeletal system and connective tissue 01/19/2022    History of neck pain    Personal history of other diseases of the musculoskeletal system and connective tissue 03/02/2021    History of scleroderma    Personal history of other diseases of the musculoskeletal system and connective tissue 06/16/2020    History of muscle weakness    Personal history of other diseases of the nervous system and sense organs 11/18/2019    History of hearing loss    Personal history of other diseases of the nervous system and sense organs 09/21/2022    History of partial seizures    Personal history of other diseases of the respiratory system 04/14/2021    History of asthma    Personal history of other endocrine, nutritional and metabolic disease 02/17/2021    History of diabetes mellitus    Personal history of other mental and behavioral disorders 05/27/2021    History of anxiety    Personal history of other specified conditions 09/07/2022    History of nipple discharge    Personal history of other specified conditions 10/16/2019    History of headache    Personal history of other specified conditions 09/28/2022    History of lump of left breast    Personal history of other specified conditions 09/16/2021    History of persistent cough    Personal history of other specified conditions 02/01/2022    History of palpitations    Personal history of other specified conditions 03/09/2022    History of headache    Personal history of other specified conditions 02/12/2014    History of chest pain    Personal history of other specified conditions 10/27/2021    History of nausea and vomiting    Personal history of other specified conditions 10/16/2019    History of fatigue    Personal history of other specified conditions 02/26/2021    History of orthopnea    Personal history of other specified conditions 02/22/2021    History of shortness of breath    Personal history of urinary calculi     H/O renal calculi     Polycystic ovary syndrome     Postural orthostatic tachycardia syndrome (POTS)     POTS (postural orthostatic tachycardia syndrome)    Rash and other nonspecific skin eruption 03/15/2022    Rash    Repeated falls 06/23/2021    Recurrent falls    Right lower quadrant pain 10/14/2020    Abdominal pain, RLQ (right lower quadrant)    Seizures (Multi)     Sjogren syndrome, unspecified (Multi)     History of Sjogren's disease    Sjogren's syndrome     Sleep apnea     Slow transit constipation 07/09/2020    Slow transit constipation    Subarachnoid hemorrhage, traumatic (Multi) 04/19/2023    Thyroid nodule     Traumatic subarachnoid hemorrhage with loss of consciousness of unspecified duration, subsequent encounter 03/15/2022    Subarachnoid hemorrhage following injury, with loss of consciousness, subsequent encounter    Traumatic subarachnoid hemorrhage without loss of consciousness, subsequent encounter     Subarachnoid hemorrhage following injury, no loss of consciousness, subsequent encounter    Type 2 diabetes mellitus     Unspecified disorder of refraction 10/07/2022    Refractive error    Unspecified optic neuritis 11/06/2020    Right optic neuritis    Unspecified optic neuritis 11/06/2020    Optic neuritis, right    Unspecified visual loss 09/25/2019    Vision loss    Varicella As a child    Venous insufficiency (chronic) (peripheral) 10/18/2021    Chronic venous insufficiency of lower extremity    Viral infection, unspecified 01/11/2022    Nonspecific syndrome suggestive of viral illness    Vitamin D deficiency     Vitamin D deficiency, unspecified 09/28/2022    Vitamin D deficiency       Surgical History  Past Surgical History:   Procedure Laterality Date    APPENDECTOMY  2017    BRAIN SURGERY  [place] placement to measure pressure    CARDIAC CATHETERIZATION      CHOLECYSTECTOMY      ENDOMETRIAL ABLATION      FRACTURE SURGERY  12/2023    HERNIA REPAIR Right 03/01/2024    with mesh    HYSTERECTOMY  2017    MR HEAD  ANGIO WO IV CONTRAST  02/08/2021    MR HEAD ANGIO WO IV CONTRAST 2/8/2021 [facility] CLINICAL LEGAC[facility]    MR NECK ANGIO WO IV CONTRAST  02/08/2021    MR NECK ANGIO WO IV CONTRAST 2/8/2021 [facility] CLINICAL LEGACY    OTHER SURGICAL HISTORY  08/22/2019    Carpal tunnel surgery    OTHER SURGICAL HISTORY  08/22/2019    Hysterectomy    OTHER SURGICAL HISTORY  08/22/2019    Venous access port placement    OTHER SURGICAL HISTORY  08/22/2019    Cholecystectomy    OTHER SURGICAL HISTORY  08/22/2019    Appendectomy    OTHER SURGICAL HISTORY  08/22/2019    Pyloroplasty    WISDOM TOOTH EXTRACTION  2004        Social History  She reports that she has never smoked. She has never used smokeless tobacco. She reports that she does not currently use alcohol. She reports current drug use. Drug: Benzodiazepines.    Family History  Family History   Problem Relation Name Age of Onset    Other (Perforated bowel) Mother Radha     Hypertension Mother Radha     Macular degeneration Mother Radha     Depression Mother Radha     Hyperlipidemia Mother Radha     Mental illness Mother Radha     Hyperlipidemia Father Mac     Hypertension Father Mac     Heart attack Father Mac     Other (S/P CABG) Father Mac     Diabetes Father Mac     Prostate cancer Father Mac     Coronary artery disease Father Mac     Heart failure Father Mac     Stroke Father Mac     Cancer Father Mac     Macular degeneration Father Mac     Retinal detachment Father Mac     Asthma Father Mac     Hearing loss Father Mac     Heart disease Father Mac     Hernia Father Mac     Stroke Sister Jazmin     Breast cancer Sister Jazmin     Lupus Sister Jazmin     Ovarian cancer Sister Jazmin     Astigmatism Sister Jazmin     Arthritis Sister Jazmin     Asthma Sister Jazmin     Depression Sister Jazmin     Mental illness Sister Jazmin     Miscarriages / Stillbirths Sister Jazmin     Vision loss Sister Jazmin     Hyperlipidemia Brother Sanchez     Hypertension Brother Sanchez      Astigmatism Brother Sanchez     Arthritis Brother Sanchez     Asthma Brother Sanchez     Diabetes Father's Sister Linda     Blindness Father's Sister Linda     Vision loss Father's Sister Linda     Thyroid cancer Father's Sister Bekah     Thyroid disease Father's Sister Jessica     Colon cancer Father's Brother ?     Cancer Father's Brother Kian     Anesthesia related problems Father's Brother Kian     Depression Father's Brother Kian     Hernia Father's Brother Kian     Mental illness Father's Brother Kian     Cancer Maternal Grandmother Brenda     Ovarian cancer Maternal Grandmother Brenda     Blindness Other Multiple     Melanoma Other      Asthma Other      Other (hay fever) Other      Allergies Other      Alcohol abuse Paternal Grandfather Elkin     Arthritis Sister Isha     Asthma Sister Isha     Cancer Sister Isha     Depression Sister Isha     Mental illness Sister Isha     Miscarriages / Stillbirths Sister Isha     Ovarian cancer Sister Isha     Glaucoma Neg Hx          Allergies  Acetazolamide, Atorvastatin, Cefdinir, Ceftriaxone, Doxepin, Duloxetine, Fd and c red no.40, Levofloxacin, Levofloxacin in d5w, Nutritional supplements, Ozempic [semaglutide], Prochlorperazine, Red dye, Rosemary, Rosemary oil, Strawberry, Sulfa (sulfonamide antibiotics), Topiramate, Tree pollen-black walnut, Tree pollen-pecan, Jber, Aripiprazole, Aspartame, Aspartame (bulk), Fenofibrate, Gluten, Hydrochlorothiazide, Hydromorphone, Iron, Meclizine, Metformin, Propoxyphene, Statins-hmg-coa reductase inhibitors, Tetracyclines, Thiazides, Venlafaxine, Wheat, Adhesive tape-silicones, Barbiturates, Ciprofloxacin, Dhe, Diet foods, Farxiga [dapagliflozin], Ferrous sulfate, L.acidoph-ladriane-b.bif-s.therm, Nsaids (non-steroidal anti-inflammatory drug), Other, Sulfonylureas, Tobramycin, Vancomycin, Adhesive, Azithromycin, Betamethasone, House dust, Metoclopramide hcl, Prednisone, Propoxyphene-acetaminophen, Sulfacetamide sodium, Wknxkkzr-0-kb3  antimigraine agents, and Zolpidem    Review of Systems  Review of systems was reviewed and otherwise negative other than what was listed in the HPI.      Physical Exam  AOX3  Vision stable (can count fingers)  FS  BUE 5  BLE 5  Incision with focal dehiscence     Last Recorded Vitals  Blood pressure 161/87, pulse 90, temperature 36.6 °C (97.9 °F), resp. rate 16, SpO2 98%.    Relevant Results        Results for orders placed or performed during the hospital encounter of 12/09/24 (from the past 24 hours)   CBC and Auto Differential   Result Value Ref Range    WBC 4.1 (L) 4.4 - 11.3 x10*3/uL    nRBC 0.0 0.0 - 0.0 /100 WBCs    RBC 4.15 4.00 - 5.20 x10*6/uL    Hemoglobin 11.4 (L) 12.0 - 16.0 g/dL    Hematocrit 35.2 (L) 36.0 - 46.0 %    MCV 85 80 - 100 fL    MCH 27.5 26.0 - 34.0 pg    MCHC 32.4 32.0 - 36.0 g/dL    RDW 15.0 (H) 11.5 - 14.5 %    Platelets 285 150 - 450 x10*3/uL    Neutrophils % 53.6 40.0 - 80.0 %    Immature Granulocytes %, Automated 0.2 0.0 - 0.9 %    Lymphocytes % 37.0 13.0 - 44.0 %    Monocytes % 7.5 2.0 - 10.0 %    Eosinophils % 1.2 0.0 - 6.0 %    Basophils % 0.5 0.0 - 2.0 %    Neutrophils Absolute 2.21 1.20 - 7.70 x10*3/uL    Immature Granulocytes Absolute, Automated 0.01 0.00 - 0.70 x10*3/uL    Lymphocytes Absolute 1.53 1.20 - 4.80 x10*3/uL    Monocytes Absolute 0.31 0.10 - 1.00 x10*3/uL    Eosinophils Absolute 0.05 0.00 - 0.70 x10*3/uL    Basophils Absolute 0.02 0.00 - 0.10 x10*3/uL   Comprehensive metabolic panel   Result Value Ref Range    Glucose 253 (H) 74 - 99 mg/dL    Sodium 140 136 - 145 mmol/L    Potassium 3.8 3.5 - 5.3 mmol/L    Chloride 102 98 - 107 mmol/L    Bicarbonate 29 21 - 32 mmol/L    Anion Gap 13 10 - 20 mmol/L    Urea Nitrogen 19 6 - 23 mg/dL    Creatinine 1.13 (H) 0.50 - 1.05 mg/dL    eGFR 61 >60 mL/min/1.73m*2    Calcium 9.3 8.6 - 10.6 mg/dL    Albumin 4.0 3.4 - 5.0 g/dL    Alkaline Phosphatase 94 33 - 110 U/L    Total Protein 8.0 6.4 - 8.2 g/dL    AST 13 9 - 39 U/L    Bilirubin,  Total 0.2 0.0 - 1.2 mg/dL    ALT 18 7 - 45 U/L   Magnesium   Result Value Ref Range    Magnesium 1.97 1.60 - 2.40 mg/dL   C-reactive protein   Result Value Ref Range    C-Reactive Protein 1.00 (H) <1.00 mg/dL     *Note: Due to a large number of results and/or encounters for the requested time period, some results have not been displayed. A complete set of results can be found in Results Review.          Assessment/Plan   Assessment & Plan  Idiopathic intracranial hypertension      Jonathan Ayala is a 48 y.o. 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 Mederos and found to 10 days of total right eye blindness, SS intact, 10/23 MRI brain/orbit neg, 10/25 s/p LP (OP28, CP9), 10/29 p/w HA and visual changes, 10/30 s/p R VPS exploration w abdominal catheter repositioning w improvement in distal runnoff, 11/6 p/w min clear incisional drainage, min decr in vision, incision oversewn with no further leakage noted, 11/15 she presented to the ED with fluid leaking from incision site and headache, 11/15 s/p RF shunt wound revision and fractured valve replacement, 12/9 p/w wound dehiscence/incisional leakage, CTH stable vents, SS is intact        Patient is presenting with focal wound dehiscence and incisional leakage.  No overt infection.  Patient appears nontoxic and no leukocytosis.  CRP is within normal limits.  However,  given history of multiple revisionsand immune system, patient will need admission for wound revision.    Plan  Floor  OR for right cranial wound revision Tues 12/10  CBC/RFP/coag/T&S/UA/EKG/CXR  NPO at [facility] meds        Tha Hart MD

## 2024-12-10 ENCOUNTER — PHARMACY VISIT (OUTPATIENT)
Dept: PHARMACY | Facility: CLINIC | Age: 49
End: 2024-12-10
Payer: MEDICAID

## 2024-12-10 ENCOUNTER — ANESTHESIA (OUTPATIENT)
Dept: OPERATING ROOM | Facility: HOSPITAL | Age: 49
End: 2024-12-10
Payer: MEDICAID

## 2024-12-10 ENCOUNTER — ANESTHESIA EVENT (OUTPATIENT)
Dept: OPERATING ROOM | Facility: HOSPITAL | Age: 49
End: 2024-12-10
Payer: MEDICAID

## 2024-12-10 ENCOUNTER — SPECIALTY PHARMACY (OUTPATIENT)
Dept: PHARMACY | Facility: CLINIC | Age: 49
End: 2024-12-10

## 2024-12-10 LAB
APTT PPP: 31 SECONDS (ref 27–38)
B-HCG SERPL-ACNC: 3 MIU/ML
ERYTHROCYTE [SEDIMENTATION RATE] IN BLOOD BY WESTERGREN METHOD: 40 MM/H (ref 0–20)
GLUCOSE BLD MANUAL STRIP-MCNC: 161 MG/DL (ref 74–99)
GLUCOSE BLD MANUAL STRIP-MCNC: 239 MG/DL (ref 74–99)
GLUCOSE BLD MANUAL STRIP-MCNC: 271 MG/DL (ref 74–99)
INR PPP: 1 (ref 0.9–1.1)
PROTHROMBIN TIME: 10.9 SECONDS (ref 9.8–12.8)

## 2024-12-10 PROCEDURE — A61320 PR OPEN SKULL DRAIN SUPRATENT ABSC

## 2024-12-10 PROCEDURE — 2500000004 HC RX 250 GENERAL PHARMACY W/ HCPCS (ALT 636 FOR OP/ED)

## 2024-12-10 PROCEDURE — 1100000001 HC PRIVATE ROOM DAILY

## 2024-12-10 PROCEDURE — 2500000002 HC RX 250 W HCPCS SELF ADMINISTERED DRUGS (ALT 637 FOR MEDICARE OP, ALT 636 FOR OP/ED): Performed by: STUDENT IN AN ORGANIZED HEALTH CARE EDUCATION/TRAINING PROGRAM

## 2024-12-10 PROCEDURE — 2500000004 HC RX 250 GENERAL PHARMACY W/ HCPCS (ALT 636 FOR OP/ED): Mod: SE

## 2024-12-10 PROCEDURE — 2500000001 HC RX 250 WO HCPCS SELF ADMINISTERED DRUGS (ALT 637 FOR MEDICARE OP): Performed by: STUDENT IN AN ORGANIZED HEALTH CARE EDUCATION/TRAINING PROGRAM

## 2024-12-10 PROCEDURE — 3600000005 HC OR TIME - INITIAL BASE CHARGE - PROCEDURE LEVEL FIVE: Performed by: NEUROLOGICAL SURGERY

## 2024-12-10 PROCEDURE — 7100000001 HC RECOVERY ROOM TIME - INITIAL BASE CHARGE: Performed by: NEUROLOGICAL SURGERY

## 2024-12-10 PROCEDURE — 99232 SBSQ HOSP IP/OBS MODERATE 35: CPT

## 2024-12-10 PROCEDURE — 2500000002 HC RX 250 W HCPCS SELF ADMINISTERED DRUGS (ALT 637 FOR MEDICARE OP, ALT 636 FOR OP/ED)

## 2024-12-10 PROCEDURE — 82947 ASSAY GLUCOSE BLOOD QUANT: CPT

## 2024-12-10 PROCEDURE — 2500000004 HC RX 250 GENERAL PHARMACY W/ HCPCS (ALT 636 FOR OP/ED): Mod: SE | Performed by: ANESTHESIOLOGY

## 2024-12-10 PROCEDURE — 3700000002 HC GENERAL ANESTHESIA TIME - EACH INCREMENTAL 1 MINUTE: Performed by: NEUROLOGICAL SURGERY

## 2024-12-10 PROCEDURE — 3600000010 HC OR TIME - EACH INCREMENTAL 1 MINUTE - PROCEDURE LEVEL FIVE: Performed by: NEUROLOGICAL SURGERY

## 2024-12-10 PROCEDURE — 2500000001 HC RX 250 WO HCPCS SELF ADMINISTERED DRUGS (ALT 637 FOR MEDICARE OP)

## 2024-12-10 PROCEDURE — 2500000005 HC RX 250 GENERAL PHARMACY W/O HCPCS: Mod: SE | Performed by: NEUROLOGICAL SURGERY

## 2024-12-10 PROCEDURE — 12020 TX SUPFC WND DEHSN SMPL CLSR: CPT | Performed by: NEUROLOGICAL SURGERY

## 2024-12-10 PROCEDURE — 2720000007 HC OR 272 NO HCPCS: Performed by: NEUROLOGICAL SURGERY

## 2024-12-10 PROCEDURE — 2780000003 HC OR 278 NO HCPCS: Performed by: NEUROLOGICAL SURGERY

## 2024-12-10 PROCEDURE — 0NT10ZZ RESECTION OF FRONTAL BONE, OPEN APPROACH: ICD-10-PCS | Performed by: NEUROLOGICAL SURGERY

## 2024-12-10 PROCEDURE — A61320 PR OPEN SKULL DRAIN SUPRATENT ABSC: Performed by: ANESTHESIOLOGY

## 2024-12-10 PROCEDURE — 7100000002 HC RECOVERY ROOM TIME - EACH INCREMENTAL 1 MINUTE: Performed by: NEUROLOGICAL SURGERY

## 2024-12-10 PROCEDURE — 2500000004 HC RX 250 GENERAL PHARMACY W/ HCPCS (ALT 636 FOR OP/ED): Performed by: STUDENT IN AN ORGANIZED HEALTH CARE EDUCATION/TRAINING PROGRAM

## 2024-12-10 PROCEDURE — 2500000005 HC RX 250 GENERAL PHARMACY W/O HCPCS: Mod: SE | Performed by: ANESTHESIOLOGY

## 2024-12-10 PROCEDURE — 94640 AIRWAY INHALATION TREATMENT: CPT

## 2024-12-10 PROCEDURE — 3700000001 HC GENERAL ANESTHESIA TIME - INITIAL BASE CHARGE: Performed by: NEUROLOGICAL SURGERY

## 2024-12-10 RX ORDER — CEFAZOLIN 1 G/1
INJECTION, POWDER, FOR SOLUTION INTRAVENOUS AS NEEDED
Status: DISCONTINUED | OUTPATIENT
Start: 2024-12-10 | End: 2024-12-10

## 2024-12-10 RX ORDER — FENTANYL CITRATE 50 UG/ML
INJECTION, SOLUTION INTRAMUSCULAR; INTRAVENOUS AS NEEDED
Status: DISCONTINUED | OUTPATIENT
Start: 2024-12-10 | End: 2024-12-10

## 2024-12-10 RX ORDER — FENTANYL CITRATE 50 UG/ML
25 INJECTION, SOLUTION INTRAMUSCULAR; INTRAVENOUS ONCE
Status: COMPLETED | OUTPATIENT
Start: 2024-12-10 | End: 2024-12-10

## 2024-12-10 RX ORDER — OXYCODONE HYDROCHLORIDE 5 MG/1
5 TABLET ORAL EVERY 4 HOURS PRN
Status: DISCONTINUED | OUTPATIENT
Start: 2024-12-10 | End: 2024-12-10 | Stop reason: HOSPADM

## 2024-12-10 RX ORDER — SUCCINYLCHOLINE CHLORIDE 100 MG/5ML
SYRINGE (ML) INTRAVENOUS AS NEEDED
Status: DISCONTINUED | OUTPATIENT
Start: 2024-12-10 | End: 2024-12-10

## 2024-12-10 RX ORDER — MIDAZOLAM HYDROCHLORIDE 1 MG/ML
INJECTION INTRAMUSCULAR; INTRAVENOUS AS NEEDED
Status: DISCONTINUED | OUTPATIENT
Start: 2024-12-10 | End: 2024-12-10

## 2024-12-10 RX ORDER — ONDANSETRON HYDROCHLORIDE 2 MG/ML
4 INJECTION, SOLUTION INTRAVENOUS ONCE AS NEEDED
Status: DISCONTINUED | OUTPATIENT
Start: 2024-12-10 | End: 2024-12-10 | Stop reason: HOSPADM

## 2024-12-10 RX ORDER — CEPHALEXIN 500 MG/1
500 CAPSULE ORAL EVERY 6 HOURS SCHEDULED
Status: DISCONTINUED | OUTPATIENT
Start: 2024-12-10 | End: 2024-12-11

## 2024-12-10 RX ORDER — INSULIN LISPRO 100 [IU]/ML
0-5 INJECTION, SOLUTION INTRAVENOUS; SUBCUTANEOUS
Status: DISCONTINUED | OUTPATIENT
Start: 2024-12-10 | End: 2024-12-11 | Stop reason: HOSPADM

## 2024-12-10 RX ORDER — ONDANSETRON HYDROCHLORIDE 2 MG/ML
INJECTION, SOLUTION INTRAVENOUS AS NEEDED
Status: DISCONTINUED | OUTPATIENT
Start: 2024-12-10 | End: 2024-12-10

## 2024-12-10 RX ORDER — PHENYLEPHRINE HCL IN 0.9% NACL 0.4MG/10ML
SYRINGE (ML) INTRAVENOUS AS NEEDED
Status: DISCONTINUED | OUTPATIENT
Start: 2024-12-10 | End: 2024-12-10

## 2024-12-10 RX ORDER — HYDROMORPHONE HYDROCHLORIDE 1 MG/ML
0.5 INJECTION, SOLUTION INTRAMUSCULAR; INTRAVENOUS; SUBCUTANEOUS EVERY 5 MIN PRN
Status: CANCELLED | OUTPATIENT
Start: 2024-12-10

## 2024-12-10 RX ORDER — LIDOCAINE HCL/PF 100 MG/5ML
SYRINGE (ML) INTRAVENOUS AS NEEDED
Status: DISCONTINUED | OUTPATIENT
Start: 2024-12-10 | End: 2024-12-10

## 2024-12-10 RX ORDER — SODIUM CHLORIDE, SODIUM LACTATE, POTASSIUM CHLORIDE, CALCIUM CHLORIDE 600; 310; 30; 20 MG/100ML; MG/100ML; MG/100ML; MG/100ML
100 INJECTION, SOLUTION INTRAVENOUS CONTINUOUS
Status: ACTIVE | OUTPATIENT
Start: 2024-12-10 | End: 2024-12-10

## 2024-12-10 RX ORDER — PROPOFOL 10 MG/ML
INJECTION, EMULSION INTRAVENOUS AS NEEDED
Status: DISCONTINUED | OUTPATIENT
Start: 2024-12-10 | End: 2024-12-10

## 2024-12-10 RX ORDER — OXYCODONE HYDROCHLORIDE 10 MG/1
10 TABLET ORAL EVERY 6 HOURS PRN
Status: DISCONTINUED | OUTPATIENT
Start: 2024-12-10 | End: 2024-12-11 | Stop reason: HOSPADM

## 2024-12-10 RX ORDER — SODIUM CHLORIDE 0.9 G/100ML
IRRIGANT IRRIGATION AS NEEDED
Status: DISCONTINUED | OUTPATIENT
Start: 2024-12-10 | End: 2024-12-10 | Stop reason: HOSPADM

## 2024-12-10 RX ORDER — LIDOCAINE HYDROCHLORIDE 10 MG/ML
0.1 INJECTION, SOLUTION INFILTRATION; PERINEURAL ONCE
Status: DISCONTINUED | OUTPATIENT
Start: 2024-12-10 | End: 2024-12-10 | Stop reason: HOSPADM

## 2024-12-10 RX ORDER — ROCURONIUM BROMIDE 10 MG/ML
INJECTION, SOLUTION INTRAVENOUS AS NEEDED
Status: DISCONTINUED | OUTPATIENT
Start: 2024-12-10 | End: 2024-12-10

## 2024-12-10 RX ORDER — HYDROMORPHONE HYDROCHLORIDE 1 MG/ML
0.2 INJECTION, SOLUTION INTRAMUSCULAR; INTRAVENOUS; SUBCUTANEOUS EVERY 5 MIN PRN
Status: CANCELLED | OUTPATIENT
Start: 2024-12-10

## 2024-12-10 SDOH — HEALTH STABILITY: MENTAL HEALTH: CURRENT SMOKER: 0

## 2024-12-10 ASSESSMENT — PAIN SCALES - GENERAL
PAINLEVEL_OUTOF10: 3
PAINLEVEL_OUTOF10: 0 - NO PAIN
PAINLEVEL_OUTOF10: 3
PAINLEVEL_OUTOF10: 6
PAINLEVEL_OUTOF10: 2
PAINLEVEL_OUTOF10: 5 - MODERATE PAIN
PAINLEVEL_OUTOF10: 5 - MODERATE PAIN
PAINLEVEL_OUTOF10: 3
PAINLEVEL_OUTOF10: 3
PAINLEVEL_OUTOF10: 7
PAINLEVEL_OUTOF10: 3
PAINLEVEL_OUTOF10: 3

## 2024-12-10 ASSESSMENT — PAIN DESCRIPTION - LOCATION: LOCATION: HEAD

## 2024-12-10 ASSESSMENT — COGNITIVE AND FUNCTIONAL STATUS - GENERAL
MOVING TO AND FROM BED TO CHAIR: A LOT
DAILY ACTIVITIY SCORE: 19
TOILETING: A LITTLE
DRESSING REGULAR LOWER BODY CLOTHING: A LITTLE
STANDING UP FROM CHAIR USING ARMS: A LOT
DRESSING REGULAR UPPER BODY CLOTHING: A LITTLE
PERSONAL GROOMING: A LITTLE
TURNING FROM BACK TO SIDE WHILE IN FLAT BAD: A LITTLE
CLIMB 3 TO 5 STEPS WITH RAILING: TOTAL
HELP NEEDED FOR BATHING: A LITTLE
MOBILITY SCORE: 12
MOVING FROM LYING ON BACK TO SITTING ON SIDE OF FLAT BED WITH BEDRAILS: A LITTLE
WALKING IN HOSPITAL ROOM: TOTAL

## 2024-12-10 ASSESSMENT — PAIN SCALES - WONG BAKER: WONGBAKER_NUMERICALRESPONSE: HURTS LITTLE BIT

## 2024-12-10 NOTE — HOSPITAL COURSE
Jonathan Ayala is a 48 y.o. 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 Mederos and found to 10 days of total right eye blindness, SS intact, 10/23 MRI brain/orbit neg, 10/25 s/p LP (OP28, CP9), 10/29 p/w HA and visual changes, 10/30 s/p R VPS exploration w abdominal catheter repositioning w improvement in distal runnoff, 11/6 p/w min clear incisional drainage, min decr in vision, incision oversewn with no further leakage noted, 11/15 she presented to the ED with fluid leaking from incision site and headache, 11/15 s/p RF shunt wound revision and fractured valve replacement, 12/9 p/w wound dehiscence/incisional leakage, CTH stable vents, SS is intact.   12/9 she presented to the ED with concerns for accidentally removing sutures and incisional drainage.  She was then admitted to neurosurgery for further evaluation and management.    12/10 s/p R crani wound exploration, washout and revision    PT/OT evaluation and recommend ***  On the day of discharge, the patient was seen and evaluated by the neurosurgery team and deemed suitable for discharge. The patient was given detailed discharge instructions and were scheduled to follow up as an outpatient.

## 2024-12-10 NOTE — ANESTHESIA PREPROCEDURE EVALUATION
Patient: Jonathan Ayala    Procedure Information       Date/Time: 12/10/24 1015    Procedure: Right cranial wound exploration, washout, and revision (Right)    Location: [facility] OR 10 / [facility] OR    Surgeons: Avelino Tafoya MD            Relevant Problems   Cardiac   (+) Benign essential hypertension   (+) Dyslipidemia, goal LDL below 100      Pulmonary   (+) Moderate persistent allergic asthma (HHS-HCC)      Neuro   (+) Idiopathic intracranial hypertension   (+) Lumbar radiculopathy   (+) Major depressive disorder, recurrent, in full remission with anxious distress (CMS-HCC)   (+) Seizure disorder (Multi)      GI   (+) Irritable bowel syndrome with diarrhea      Endocrine   (+) Class 3 severe obesity due to excess calories with serious comorbidity and body mass index (BMI) of 40.0 to 44.9 in adult   (+) Diabetic gastroparesis associated with type 2 diabetes mellitus (Multi)   (+) Hypothyroidism   (+) Type 2 diabetes mellitus with hyperglycemia, with long-term current use of insulin      ID   (+) Vaginal moniliasis       Clinical information reviewed:   Tobacco  Allergies  Meds   Med Hx  Surg Hx  OB Status  Fam Hx  Soc   Hx        NPO Detail:  No data recorded     Physical Exam    Airway  Mallampati: I  TM distance: >3 FB  Neck ROM: full     Cardiovascular - normal exam     Dental - normal exam     Pulmonary - normal exam     Abdominal - normal exam         Anesthesia Plan    History of general anesthesia?: yes  History of complications of general anesthesia?: no    ASA 3     general     The patient is not a current smoker.  Patient was not previously instructed to abstain from smoking on day of procedure.  Patient did not smoke on day of procedure.    intravenous induction   Postoperative administration of opioids is intended.  Anesthetic plan and risks discussed with patient.  Use of blood products discussed with patient who.

## 2024-12-10 NOTE — ANESTHESIA PROCEDURE NOTES
Airway  Date/Time: 12/10/2024 11:29 AM  Urgency: elective    Airway not difficult    Staffing  Performed: MARY ANN   Authorized by: Jaspal Pace MD    Performed by: PATI Platt  Patient location during procedure: OR    Indications and Patient Condition  Indications for airway management: anesthesia  Spontaneous ventilation: present  Sedation level: deep  Preoxygenated: yes  Patient position: sniffing  Mask difficulty assessment: 2 - vent by mask + OA or adjuvant +/- NMBA    Final Airway Details  Final airway type: endotracheal airway      Successful airway: ETT  Cuffed: yes   Successful intubation technique: video laryngoscopy (Glidescope)  Facilitating devices/methods: cricoid pressure and intubating stylet  Endotracheal tube insertion site: oral  Blade size: #3  ETT size (mm): 7.0  Cormack-Lehane Classification: grade I - full view of glottis  Placement verified by: capnometry and palpation of cuff   Measured from: lips  ETT to lips (cm): 22  Number of attempts at approach: 1

## 2024-12-10 NOTE — PROGRESS NOTES
Department of Neurosurgery  Daily Progress Note    Patient Name: Jonathan Ayala  MRN: [id number]  Date:  12/10/24     Subjective  Patient states she was scratching her head because it was itching and felt a pop then drainage. Denies any fever, chills, night sweats, CP, SOB, palpitations, nausea, vomiting, or abdominal pain/discomfort.    Overnight Events  Admitted to ED overnight for cranial incision drainage    Procedures  OR today     Objective    Vital Signs  /75   Pulse 91   Temp 36.6 °C (97.9 °F)   Resp 18   SpO2 96%      Physical Exam  Constitutional: A&Ox3, calm and cooperative, NAD.  Eyes: PERRL, clear sclera .  ENMT: Moist mucous membranes, no apparent injuries or lesions.  Head/Neck: Neck supple, no JVD. NC/AT.   Cardiovascular: Normal rate and regular rhythm. 2+ equal pulses of the distal extremities.  Respiratory/Thorax: CTAB, regular respirations on RA. Good symmetric chest expansion.   Gastrointestinal: Abdomen soft, non tender.   Genitourinary: Voiding independently.   Extremities: No peripheral edema. Moving all 4 extremities actively.   Psychological: Appropriate mood and behavior.   Neurological:   A&Ox3  OU3R, EOMI   Face symmetric, Facial SILT   Tongue midline and symmetric  RUE D5 / B5 / T5 / HG 5/ IO 5  LUE D5 / B5 / T5 / HG 5/ IO 5  RLE HF5 / KE 5/ DF 5/ PF 5  LLE HF5 / KE 5/ DF 5/ PF 5  No clonus, neg Gonzalez  Skin: right crani incision with drainage    Diagnostics   Results for orders placed or performed during the hospital encounter of 12/09/24 (from the past 24 hours)   CBC and Auto Differential   Result Value Ref Range    WBC 4.1 (L) 4.4 - 11.3 x10*3/uL    nRBC 0.0 0.0 - 0.0 /100 WBCs    RBC 4.15 4.00 - 5.20 x10*6/uL    Hemoglobin 11.4 (L) 12.0 - 16.0 g/dL    Hematocrit 35.2 (L) 36.0 - 46.0 %    MCV 85 80 - 100 fL    MCH 27.5 26.0 - 34.0 pg    MCHC 32.4 32.0 - 36.0 g/dL    RDW 15.0 (H) 11.5 - 14.5 %    Platelets 285 150 - 450 x10*3/uL    Neutrophils % 53.6 40.0 - 80.0 %     Immature Granulocytes %, Automated 0.2 0.0 - 0.9 %    Lymphocytes % 37.0 13.0 - 44.0 %    Monocytes % 7.5 2.0 - 10.0 %    Eosinophils % 1.2 0.0 - 6.0 %    Basophils % 0.5 0.0 - 2.0 %    Neutrophils Absolute 2.21 1.20 - 7.70 x10*3/uL    Immature Granulocytes Absolute, Automated 0.01 0.00 - 0.70 x10*3/uL    Lymphocytes Absolute 1.53 1.20 - 4.80 x10*3/uL    Monocytes Absolute 0.31 0.10 - 1.00 x10*3/uL    Eosinophils Absolute 0.05 0.00 - 0.70 x10*3/uL    Basophils Absolute 0.02 0.00 - 0.10 x10*3/uL   Comprehensive metabolic panel   Result Value Ref Range    Glucose 253 (H) 74 - 99 mg/dL    Sodium 140 136 - 145 mmol/L    Potassium 3.8 3.5 - 5.3 mmol/L    Chloride 102 98 - 107 mmol/L    Bicarbonate 29 21 - 32 mmol/L    Anion Gap 13 10 - 20 mmol/L    Urea Nitrogen 19 6 - 23 mg/dL    Creatinine 1.13 (H) 0.50 - 1.05 mg/dL    eGFR 61 >60 mL/min/1.73m*2    Calcium 9.3 8.6 - 10.6 mg/dL    Albumin 4.0 3.4 - 5.0 g/dL    Alkaline Phosphatase 94 33 - 110 U/L    Total Protein 8.0 6.4 - 8.2 g/dL    AST 13 9 - 39 U/L    Bilirubin, Total 0.2 0.0 - 1.2 mg/dL    ALT 18 7 - 45 U/L   Magnesium   Result Value Ref Range    Magnesium 1.97 1.60 - 2.40 mg/dL   C-reactive protein   Result Value Ref Range    C-Reactive Protein 1.00 (H) <1.00 mg/dL   Sedimentation rate, automated   Result Value Ref Range    Sedimentation Rate 40 (H) 0 - 20 mm/h   Type And Screen   Result Value Ref Range    ABO TYPE A     Rh TYPE POS     ANTIBODY SCREEN NEG    Blood Culture    Specimen: Peripheral Venipuncture; Blood culture   Result Value Ref Range    Blood Culture Loaded on Instrument - Culture in progress    Coagulation Screen   Result Value Ref Range    Protime 10.9 9.8 - 12.8 seconds    INR 1.0 0.9 - 1.1    aPTT 31 27 - 38 seconds   hCG, quantitative, pregnancy   Result Value Ref Range    HCG, Beta-Quantitative 3 <5 mIU/mL   Blood Culture    Specimen: Peripheral Venipuncture; Blood culture   Result Value Ref Range    Blood Culture Loaded on Instrument -  Culture in progress      *Note: Due to a large number of results and/or encounters for the requested time period, some results have not been displayed. A complete set of results can be found in Results Review.     CT head wo IV contrast    Result Date: 12/9/2024  Interpreted By:  Elkin Joyce, STUDY: CT HEAD WO IV CONTRAST;  12/9/2024 4:58 pm   INDICATION: Signs/Symptoms:headaches, had recent shunt revision, eval for ventriculomegaly.   COMPARISON: November 15   ACCESSION NUMBER(S): [id number]   ORDERING CLINICIAN: JIMY CONDON   TECHNIQUE: Noncontrast enhanced CT brain   FINDINGS: * Right frontal ventricular catheter unchanged in position within a non dilated ventricular system. *Unchanged gliosis or encephalomalacia surrounds the catheter within the right frontal lobe of the brain *No evidence of hemorrhage or extra-axial collection       * There is no measurable change compared to the previous exam.   MACRO: none   Signed by: Elkin Joyce 12/9/2024 5:03 PM Dictation workstation:   [id number]    XR shunt series    Result Date: 12/9/2024  Interpreted By:  Boo Davis, STUDY: XR SHUNT SERIES; 12/9/2024 4:40 pm   INDICATION: Signs/Symptoms:recent shunt revision, headaches.   COMPARISON: 11/07/2024.   ACCESSION NUMBER(S): [id number]   ORDERING CLINICIAN: JIMY CONDON   FINDINGS: Two views of the skull in AP and lateral projection, a single AP view of the chest and a single AP view of the abdomen were obtained. A right parietal ventriculostomy shunt catheter is present. Shunt tubing is seen extending over the  right lateral skull,  right neck, midline anterior chest and is seen to coil over the  left lower quadrant. The shunt catheter is mildly tortuous over the left abdomen and pelvis. No discontinuity. Allowing for low lung volumes, no focal infiltrate, pleural effusion or pneumothorax identified.  There is a nonobstructive bowel gas pattern present.  No evidence of acute fracture is  identified.  The visualized paranasal sinuses are clear. Right chest port with terminus at the right atrium.       1.  Right parietal ventriculostomy shunt catheter, as described above. There is mild tortuosity of the catheter in the left abdomen and pelvis, kinking in this region can not be excluded. 2.  Allowing for low lung volumes, no focal infiltrate or pneumothorax. 3.  Nonobstructive bowel gas pattern.   Signed by: Boo Davis 12/9/2024 4:48 PM Dictation workstation:   [id number]      Current Medications  Scheduled medications  acetaminophen, 650 mg, oral, q6h OLESYA  amitriptyline, 100 mg, oral, Nightly  clonazePAM, 0.5 mg, oral, BID  divalproex, 500 mg, oral, BID  ezetimibe, 10 mg, oral, Daily  fluticasone, 2 spray, Each Nostril, Daily  fluticasone furoate-vilanteroL, 1 puff, inhalation, Daily  furosemide, 20 mg, oral, BID  ipratropium-albuteroL, 3 mL, nebulization, q6h  lacosamide, 200 mg, oral, BID  lacosamide, 50 mg, oral, BID  levETIRAcetam, 1,500 mg, oral, BID  levothyroxine, 75 mcg, oral, Daily before breakfast  montelukast, 10 mg, oral, Nightly  pantoprazole, 40 mg, oral, BID AC  polyethylene glycol, 17 g, oral, BID  propranolol LA, 60 mg, oral, Daily  sennosides-docusate sodium, 2 tablet, oral, BID    Continuous medications  sodium chloride 0.9%, 75 mL/hr, Last Rate: 75 mL/hr (12/09/24 1931)    PRN medications  PRN medications: EPINEPHrine HCl (PF), naloxone, ondansetron **OR** ondansetron, oxyCODONE, oxyCODONE     Assessment/Plan  Jonathan Ayala is a 48 y.o. 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 Mederos and found to 10 days of total right eye blindness, SS intact, 10/23 MRI brain/orbit neg, 10/25 s/p LP (OP28, CP9), 10/29 p/w HA and visual changes, 10/30 s/p R VPS exploration w abdominal catheter repositioning w improvement in distal runnoff, 11/6 p/w min clear incisional drainage, min decr in vision, incision oversewn with no further leakage noted, 11/15 she presented to the ED with fluid leaking from incision site and headache, 11/15 s/p RF shunt wound revision and fractured valve replacement, 12/9 p/w wound dehiscence/incisional leakage, CTH stable vents, SS is intact.   12/9 she presented to the ED with concerns for accidentally removing sutures and incisional drainage.  She was then admitted to neurosurgery for further evaluation and management.    # Plan for OR 12/10 for Right cranial wound exploration and revision  - NPO, preop labs  - PT/OT eval post op  - Monitor Neuro checks Q4hrs  - Monitor VS/pulse ox Q4hrs   - Monitor AM CBC/RFP  # Acute pain  - Well controlled per patient, continue current regimen  -Scheduled Tylenol 650mg PO [id number]   -Oxycodone 5/10mg PO [id number] PRN mod/severe pain   - Bowel regimen while using narcotics  - IV Zofran PRN nausea due to narcotics   - Pain assessments [id number]     # Hx of IIH  - S/p RF  shunt (certas at 5), multiple explorations and wound revisions post op  # Hx of Seizures  - Currently stable  - Seizure precautions  - Continue home Depakote 500mg BID, Vimpat 250mg BID, and Keppra 1,500mg BID   # Hx HTN  - Continue home Propranolol 60mg  - Continue home Lasix 20mg BID   # Hx HypoT  - Continue home Levothyroxine 75mcg daily   # Hx DM2  - Monitor blood glucose, ACHS  - SSI  - Continue home Lantus  - Diabetic diet when not NPO  #  Hx of COPD  - Continue home CPAP  # Hx anxiety/depression  - Continue home Elavil 100mg nightly, Requip 0.5mg in AM and 1mg nightly, and clonazepam 0.5mg BID    Prophylaxis:   - DVT: SQH held preop, SCDs, encourage ambulation   - Encourage IS x10 every hour while awake     FEN/GI:  - Monitor/replete electrolytes PRN   - mIVF while NPO  - NPO for OR   - GI protection with Protonix 40mg daily   - Bowel regimen: Scheduled Miralax and pericolace daily        Disposition: admit to neurosurgery and OR for wound exploration    Patient and plan discussed with Dr. Tafoya and Chief resident Dr. Shakila Duggan.    RAUL Paulino-[facility]  Neurosurgery  [place]  Team Pager #[phone]    Total face to face time spent with patient/family of > 60 minutes, with >50% of the time spent discussing plan of care/management, counseling/educating on disease processes, explaining results of diagnostic testing.

## 2024-12-10 NOTE — INTERVAL H&P NOTE
H&P reviewed. Patient now with drainage from shunt incision; plan for exploration/revision of wound.

## 2024-12-10 NOTE — OP NOTE
Right cranial wound exploration, washout, and revision (R) Operative Note     Date: 12/10/2024  OR Location: [facility] OR    Name: Jonathan Ayala, : 1975, Age: 48 y.o., MRN: [id number], Sex: female    Diagnosis  Pre-op Diagnosis      * Idiopathic intracranial hypertension [G93.2] Post-op Diagnosis     * Idiopathic intracranial hypertension [G93.2]     Procedures  Right cranial wound exploration, washout, and revision  23834 - DE CRANIECTOMY/CRANIOTMY DRG ABSCESS SUPRATENTORIAL      Surgeons      * Avelino Tafoya - Primary    Resident/Fellow/Other Assistant:  Surgeons and Role:     * Norm Monson MD - Resident - Assisting    Staff:   Circulator: Lore  Scrub Person: Emmanuel Puri Scrub: Emilia Guadalupe Person: Barbie Puri Circulator: Edilia    Anesthesia Staff: Anesthesiologist: Jaspal Pace MD  C-AA: PATI Platt  Medical Student: Kelly Sy    Procedure Summary  Anesthesia: General  ASA: III  Estimated Blood Loss: 50mL  Intra-op Medications:   Administrations occurring from 1015 to 1310 on 12/10/24:   Medication Name Total Dose   sodium chloride 0.9 % irrigation solution 2,000 mL   acetaminophen (Tylenol) tablet 650 mg Cannot be calculated   insulin lispro injection 0-5 Units Cannot be calculated   lactated Ringer's infusion 25 mL   naloxone (Narcan) injection 0.2 mg Cannot be calculated   oxyCODONE (Roxicodone) immediate release tablet 10 mg Cannot be calculated   oxyCODONE (Roxicodone) immediate release tablet 5 mg Cannot be calculated   oxygen (O2) therapy 52 L   polyethylene glycol (Glycolax, Miralax) packet 17 g Cannot be calculated   sennosides-docusate sodium (Georgina-Colace) 8.6-50 mg per tablet 2 tablet Cannot be calculated   ceFAZolin (Ancef) vial 1 g 2 g   fentaNYL (Sublimaze) injection 50 mcg/mL 100 mcg   LR bolus Cannot be calculated   lidocaine (cardiac) injection 2% prefilled syringe 60 mg   midazolam PF (Versed) injection 1 mg/mL 2 mg   ondansetron 2 mg/mL 4 mg   phenylephrine 40  mcg/mL syringe 10 mL 120 mcg   propofol (Diprivan) injection 10 mg/mL 200 mg   rocuronium (ZeMuron) 50 mg/5 mL injection 70 mg   succinylcholine 100 mg/5 mL syringe 120 mg   sugammadex (Bridion) 200 mg/2 mL injection 200 mg              Anesthesia Record               Intraprocedure I/O Totals          Intake    LR bolus 800.00 mL    Total Intake 800 mL          Specimen: No specimens collected              Drains and/or Catheters: * None in log *    Tourniquet Times:         Implants:     Findings: no matthieu purulence; friable tissue    Indications: Jonathan Ayala is an 48 y.o. female who is having surgery for Idiopathic intracranial hypertension [G93.2].     The patient was seen in the preoperative area. The risks, benefits, complications, treatment options, non-operative alternatives, expected recovery and outcomes were discussed with the patient. The possibilities of reaction to medication, pulmonary aspiration, injury to surrounding structures, bleeding, recurrent infection, the need for additional procedures, failure to diagnose a condition, and creating a complication requiring transfusion or operation were discussed with the patient. The patient concurred with the proposed plan, giving informed consent.  The site of surgery was properly noted/marked if necessary per policy. The patient has been actively warmed in preoperative area. Preoperative antibiotics have been ordered and given within 1 hours of incision. Venous thrombosis prophylaxis have been ordered including bilateral sequential compression devices    Procedure Details:       The patient was brought back from preop to OR by the anesthesia and OR staff. A preoperative timeout was performed in order to confirm the correct procedure, patient, and laterality. The patient underwent induction and intubation by the anesthesia team.      The patient was placed in the supine position.  The arms were padded.  The patient's head was placed on a foam donut.  The  right scalp was cleansed, prepped and draped in usual sterile fashion, and the prior incision was marked.  Another timeout was performed. Local anesthetic was used to infiltrate skin over incision.     The prior incision was fully opened to expose the proximal portion of the shunt. Skin and underlying was noted to be extremely friable. All edges were cleaned of necrotic tissue with a #15 blade. The resection cavity was copiously irrigated and hemostasis was obtained. Incision was irrigated one last time and incision was closed with 3-0 vicryls and 2-0 PDS on galea, and prolene suture for skin. Patient was turned over to anesthesia without complication. All counts were correct at end of case.        Complications:  None; patient tolerated the procedure well.    Disposition: PACU - hemodynamically stable.  Condition: stable                 Additional Details: none    Attending Attestation:     Avelino Tafoya  Phone Number: [phone]

## 2024-12-11 ENCOUNTER — DOCUMENTATION (OUTPATIENT)
Dept: HOME HEALTH SERVICES | Facility: HOME HEALTH | Age: 49
End: 2024-12-11
Payer: MEDICAID

## 2024-12-11 ENCOUNTER — PATIENT MESSAGE (OUTPATIENT)
Dept: ENDOCRINOLOGY | Facility: CLINIC | Age: 49
End: 2024-12-11
Payer: MEDICAID

## 2024-12-11 VITALS
HEART RATE: 105 BPM | OXYGEN SATURATION: 94 % | DIASTOLIC BLOOD PRESSURE: 84 MMHG | TEMPERATURE: 97.7 F | SYSTOLIC BLOOD PRESSURE: 130 MMHG | RESPIRATION RATE: 18 BRPM

## 2024-12-11 DIAGNOSIS — E11.65 TYPE 2 DIABETES MELLITUS WITH HYPERGLYCEMIA, WITH LONG-TERM CURRENT USE OF INSULIN: ICD-10-CM

## 2024-12-11 DIAGNOSIS — Z79.4 TYPE 2 DIABETES MELLITUS WITH HYPERGLYCEMIA, WITH LONG-TERM CURRENT USE OF INSULIN: ICD-10-CM

## 2024-12-11 LAB
GLUCOSE BLD MANUAL STRIP-MCNC: 232 MG/DL (ref 74–99)
GLUCOSE BLD MANUAL STRIP-MCNC: 233 MG/DL (ref 74–99)
GLUCOSE BLD MANUAL STRIP-MCNC: 268 MG/DL (ref 74–99)

## 2024-12-11 PROCEDURE — 2500000004 HC RX 250 GENERAL PHARMACY W/ HCPCS (ALT 636 FOR OP/ED): Performed by: STUDENT IN AN ORGANIZED HEALTH CARE EDUCATION/TRAINING PROGRAM

## 2024-12-11 PROCEDURE — 94640 AIRWAY INHALATION TREATMENT: CPT

## 2024-12-11 PROCEDURE — 2500000004 HC RX 250 GENERAL PHARMACY W/ HCPCS (ALT 636 FOR OP/ED): Performed by: PHYSICIAN ASSISTANT

## 2024-12-11 PROCEDURE — 82947 ASSAY GLUCOSE BLOOD QUANT: CPT

## 2024-12-11 PROCEDURE — 2500000001 HC RX 250 WO HCPCS SELF ADMINISTERED DRUGS (ALT 637 FOR MEDICARE OP): Performed by: STUDENT IN AN ORGANIZED HEALTH CARE EDUCATION/TRAINING PROGRAM

## 2024-12-11 PROCEDURE — 2500000002 HC RX 250 W HCPCS SELF ADMINISTERED DRUGS (ALT 637 FOR MEDICARE OP, ALT 636 FOR OP/ED): Performed by: STUDENT IN AN ORGANIZED HEALTH CARE EDUCATION/TRAINING PROGRAM

## 2024-12-11 PROCEDURE — 2500000001 HC RX 250 WO HCPCS SELF ADMINISTERED DRUGS (ALT 637 FOR MEDICARE OP): Performed by: PHYSICIAN ASSISTANT

## 2024-12-11 RX ORDER — PEN NEEDLE, DIABETIC 30 GX3/16"
NEEDLE, DISPOSABLE MISCELLANEOUS
Qty: 100200 EACH | Refills: 9 | Status: SHIPPED | OUTPATIENT
Start: 2024-12-11

## 2024-12-11 RX ORDER — CLINDAMYCIN HYDROCHLORIDE 300 MG/1
300 CAPSULE ORAL EVERY 6 HOURS
Qty: 24 CAPSULE | Refills: 0 | Status: SHIPPED | OUTPATIENT
Start: 2024-12-11 | End: 2024-12-17

## 2024-12-11 RX ORDER — DIPHENHYDRAMINE HYDROCHLORIDE 50 MG/ML
10 INJECTION INTRAMUSCULAR; INTRAVENOUS ONCE
Status: COMPLETED | OUTPATIENT
Start: 2024-12-11 | End: 2024-12-11

## 2024-12-11 RX ORDER — CLINDAMYCIN HYDROCHLORIDE 300 MG/1
300 CAPSULE ORAL EVERY 6 HOURS
Status: DISCONTINUED | OUTPATIENT
Start: 2024-12-11 | End: 2024-12-11 | Stop reason: HOSPADM

## 2024-12-11 RX ORDER — CEPHALEXIN 500 MG/1
500 CAPSULE ORAL EVERY 6 HOURS SCHEDULED
Qty: 24 CAPSULE | Refills: 0 | Status: CANCELLED | OUTPATIENT
Start: 2024-12-11 | End: 2024-12-17

## 2024-12-11 RX ORDER — OXYCODONE HYDROCHLORIDE 5 MG/1
5 TABLET ORAL EVERY 6 HOURS PRN
Qty: 28 TABLET | Refills: 0 | Status: CANCELLED | OUTPATIENT
Start: 2024-12-11 | End: 2024-12-18

## 2024-12-11 RX ORDER — HEPARIN 100 UNIT/ML
5 SYRINGE INTRAVENOUS AS NEEDED
Status: DISCONTINUED | OUTPATIENT
Start: 2024-12-11 | End: 2024-12-11 | Stop reason: HOSPADM

## 2024-12-11 RX ORDER — CLINDAMYCIN HYDROCHLORIDE 300 MG/1
300 CAPSULE ORAL 3 TIMES DAILY
Qty: 18 CAPSULE | Refills: 0 | Status: SHIPPED | OUTPATIENT
Start: 2024-12-11 | End: 2024-12-11

## 2024-12-11 RX ORDER — OXYCODONE HYDROCHLORIDE 5 MG/1
5 TABLET ORAL EVERY 6 HOURS PRN
Qty: 15 TABLET | Refills: 0 | Status: SHIPPED | OUTPATIENT
Start: 2024-12-11 | End: 2024-12-15

## 2024-12-11 ASSESSMENT — COGNITIVE AND FUNCTIONAL STATUS - GENERAL
DRESSING REGULAR UPPER BODY CLOTHING: A LITTLE
CLIMB 3 TO 5 STEPS WITH RAILING: A LITTLE
MOBILITY SCORE: 18
WALKING IN HOSPITAL ROOM: A LITTLE
MOVING TO AND FROM BED TO CHAIR: A LITTLE
PERSONAL GROOMING: A LITTLE
DRESSING REGULAR UPPER BODY CLOTHING: A LITTLE
DRESSING REGULAR LOWER BODY CLOTHING: A LITTLE
MOBILITY SCORE: 18
DAILY ACTIVITIY SCORE: 18
EATING MEALS: A LITTLE
TURNING FROM BACK TO SIDE WHILE IN FLAT BAD: A LITTLE
WALKING IN HOSPITAL ROOM: A LITTLE
STANDING UP FROM CHAIR USING ARMS: A LITTLE
MOVING FROM LYING ON BACK TO SITTING ON SIDE OF FLAT BED WITH BEDRAILS: A LITTLE
TURNING FROM BACK TO SIDE WHILE IN FLAT BAD: A LITTLE
DRESSING REGULAR LOWER BODY CLOTHING: A LITTLE
PERSONAL GROOMING: A LITTLE
DAILY ACTIVITIY SCORE: 18
EATING MEALS: A LITTLE
HELP NEEDED FOR BATHING: A LITTLE
MOVING FROM LYING ON BACK TO SITTING ON SIDE OF FLAT BED WITH BEDRAILS: A LITTLE
HELP NEEDED FOR BATHING: A LITTLE
TOILETING: A LITTLE
STANDING UP FROM CHAIR USING ARMS: A LITTLE
MOVING TO AND FROM BED TO CHAIR: A LITTLE
TOILETING: A LITTLE
CLIMB 3 TO 5 STEPS WITH RAILING: A LITTLE

## 2024-12-11 ASSESSMENT — PAIN - FUNCTIONAL ASSESSMENT
PAIN_FUNCTIONAL_ASSESSMENT: 0-10

## 2024-12-11 ASSESSMENT — PAIN SCALES - GENERAL
PAINLEVEL_OUTOF10: 4
PAINLEVEL_OUTOF10: 6
PAIN_LEVEL: 4
PAINLEVEL_OUTOF10: 7
PAINLEVEL_OUTOF10: 3
PAINLEVEL_OUTOF10: 7

## 2024-12-11 ASSESSMENT — ACTIVITIES OF DAILY LIVING (ADL): LACK_OF_TRANSPORTATION: NO

## 2024-12-11 NOTE — HH CARE COORDINATION
Home Care received a referral for Nursing. Unfortunately, we are unable to accept and process the referral at this time due to being active with another HC Agency, lives out of our service area, and was a mistake for the referral to be sent to [facility]>     Patients, please reach out to the referring provider or your PCP to assist in obtaining an alternative home care agency and/or guidance to meet your needs.    Providers, please reach out to [facility] with any questions regarding the declined referral.

## 2024-12-11 NOTE — PROGRESS NOTES
12/11/24 1050   Discharge Planning   Living Arrangements Spouse/significant other   Support Systems Spouse/significant other   Assistance Needed Pt has a HHA that assists 3x/ week with all ADLs and iADLS, spouse assists.   Type of Residence Private residence   Number of Stairs to Enter Residence 2   Number of Stairs Within Residence 2   Do you have animals or pets at home? Yes   Who is requesting discharge planning? Provider   Home or Post Acute Services In home services   Type of Home Care Services Home health aide;Home nursing visits   Expected Discharge Disposition Home H   Does the patient need discharge transport arranged? No   Financial Resource Strain   How hard is it for you to pay for the very basics like food, housing, medical care, and heating? Not hard   Housing Stability   In the last 12 months, was there a time when you were not able to pay the mortgage or rent on time? N   At any time in the past 12 months, were you homeless or living in a shelter (including now)? N   Transportation Needs   In the past 12 months, has lack of transportation kept you from medical appointments or from getting medications? no   In the past 12 months, has lack of transportation kept you from meetings, work, or from getting things needed for daily living? No   Patient Choice   Provider Choice list and CMS website (https://medicare.gov/care-compare#search) for post-acute Quality and Resource Measure Data were provided and reviewed with: Other (Comment)  (Freedom of choice explained to pt. Pt agreeable to HC refferals being sent to agencies in her area. Pt to choose from an accepting HC agency.)   Patient / Family choosing to utilize agency / facility established prior to hospitalization Yes  (Resumed [facility] infusion and resumed HHA via [facility].)     Met with pt and introduced myself as Care Coordinator with Care Transitions Team for Discharge Planning. Pt stated she feels safe at home. Per pt she has a HHA via [facility] ([phone]) that assists with all daily activities 3x/ week 5 1/2 hours/day.   Active with [facility] for IVIG infusions every two weeks.    Discharge disposition:  Home with resumed HHA, RN for infusions. New wound care orders    DME: Quad cane, walker, rollator, wheelchair, hospital bed, bedside commode, CPAP machine, shower chair, shower bench.     PCP:  Isidoro Mensah    Pharmacy: [place], [place]     This RN advised pt that per MD she requires a HC RN for wound care. Pt advised that [facility] out of [facility] for HC services. Pt agreeable to this nurse submitting HC referrals to agencies within her zipcode. Agreeable to this nurse calling [facility] to confirm which HC agency they utilize for infusions, and to call  Network to confirm if they offer nursing as she is active with both.  Per pt if unable to obtain HHC she is agreeable to outpatient follow up for wound care.     Telephone call to [facility]: per  they offer skilled nursing will have to confirm If they can assist. Stated she has to confirm with nursing and update.     [facility] call to [facility] who confirmed that they only supply skilled nursing for infusions, not wound care.     Referrals [facility] via [facility] for [facility] RN.    Telephone call to [facility] advised that referral was submitted, admission nurse Shanta to review and update    Addendum 1436: Telephone call from [facility]  (Shanta [phone]) who confirmed they can accept pt for skilled nursing  Start of care confirmed for tomorrow, Thursday 12/12. PA updated. Agency requested that after visit summary be faxed to her at [phone].    Addendum 1445: This nurse met with pt and updated on accepting HC agency, the name and phone number of the agency provided to pt.     AVS faxed to the provided fax for HC

## 2024-12-11 NOTE — DISCHARGE SUMMARY
Discharge Diagnosis  Idiopathic intracranial hypertension    Issues Requiring Follow-Up  N/A    Test Results Pending At Discharge  Pending Labs       Order Current Status    Extra Urine Gray Tube Collected (12/09/24 2318)    Urinalysis with Reflex Culture and Microscopic Collected (12/09/24 2318)    Blood Culture Preliminary result    Blood Culture Preliminary result            Hospital Course  Jonathan Ayala is a 48 y.o. 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 Mederos and found to 10 days of total right eye blindness, SS intact, 10/23 MRI brain/orbit neg, 10/25 s/p LP (OP28, CP9), 10/29 p/w HA and visual changes, 10/30 s/p R VPS exploration w abdominal catheter repositioning w improvement in distal runnoff, 11/6 p/w min clear incisional drainage, min decr in vision, incision oversewn with no further leakage noted, 11/15 she presented to the ED with fluid leaking from incision site and headache, 11/15 s/p RF shunt wound revision and fractured valve replacement, 12/9 p/w wound dehiscence/incisional leakage, CTH stable vents, SS is intact.   12/9 she presented to the ED with concerns for accidentally removing sutures and incisional drainage.  She was then admitted to neurosurgery for further evaluation and management.    12/10 s/p R crani wound exploration, washout and revision  12/11 medically cleared for  "discharge    PT/OT evaluation and recommend discharge home  On the day of discharge, the patient was seen and evaluated by the neurosurgery team and deemed suitable for discharge. The patient was given detailed discharge instructions and were scheduled to follow up as an outpatient.       Pertinent Physical Exam At Time of Discharge  Physical Exam  Physical Exam  Constitutional: A&Ox3, calm and cooperative, NAD.  Head: incision visualized on right scalp, appears well approximated with suture, no active drainage noted  Eyes: PERRL, EOMI.   ENMT: Moist mucous membranes, no apparent injuries or lesions.  Neurological:   A&Ox3  Face symmetric, Facial SILT   Tongue midline and symmetric  RUE D5 / B5 / T5 / HG 5/ IO 5  LUE D5 / B5 / T5 / HG 5/ IO 5  RLE HF5 / KE 5/ DF 5/ PF 5  LLE HF5 / KE 5/ DF 5/ PF 5  No clonus, neg Gonzalez  SILT  Psychological: Appropriate mood and behavior.   Skin: Warm and dry.   Home Medications     Medication List      START taking these medications     clindamycin 300 mg capsule; Commonly known as: Cleocin; Take 1 capsule   (300 mg) by mouth 3 times a day for 6 days.     CHANGE how you take these medications     pen needle, diabetic 32 gauge x 5/32\" needle; Commonly known as: BD Lela   2nd Gen Pen Needle; Use as directed with insulin pen up to 5 times daily;   What changed: additional instructions, Another medication with the same   name was removed. Continue taking this medication, and follow the   directions you see here.     CONTINUE taking these medications     acetaminophen 325 mg tablet; Commonly known as: Tylenol   Advair -21 mcg/actuation inhaler; Generic drug: fluticasone   propion-salmeteroL   amitriptyline 100 mg tablet; Commonly known as: Elavil; Take 1 tablet   (100 mg) by mouth once daily at bedtime.   azelastine 137 mcg (0.1 %) nasal spray; Commonly known as: Astelin   * Baqsimi 3 mg/actuation spray,non-aerosol; Generic drug: glucagon; USE   ONE SPRAY IN THE NOSE AS NEEDED " FOR LOW BLOOD SUGAR  MAY REPEAT AFTER 15   MINUTES USING A NEW DEVICE IF NO RESPONSE   * glucagon 1 mg injection; Commonly known as: Glucagen; Inject 1 mg   under the skin 1 time if needed for low blood sugar - see comments   (hypoglycemia).   carboxymethylcellulose 1 % ophthalmic solution dropperette; Commonly   known as: Refresh Celluvisc   cholecalciferol 5,000 Units tablet; Commonly known as: Vitamin D-3   Dexcom G7  misc; Generic drug: blood-glucose meter,continuous   DEXCOM G7 SENSOR MISC   disposable gloves misc; Commonly known as: Disposable Latex-Free Gloves;   Use as needed   divalproex 500 mg 24 hr tablet; Commonly known as: Depakote ER   EPINEPHrine 0.3 mg/0.3 mL injection syringe; Commonly known as: Epipen;   INJECT INTRAMUSCULARLY ONCE AS NEEDED FOR ANAPHYLAXIS   ezetimibe 10 mg tablet; Commonly known as: Zetia; Take 1 tablet (10 mg)   by mouth once daily.   fluticasone 50 mcg/actuation nasal spray; Commonly known as: Flonase;   USE 1 SPRAY INTO EACH NOSTRIL ONCE DAILY.   folic acid 1 mg tablet; Commonly known as: Folvite; Take 1 tablet (1 mg)   by mouth once daily.   furosemide 20 mg tablet; Commonly known as: Lasix; Take 1 tablet (20 mg)   by mouth 2 times a day.   gloves, latex with aloe vera misc; Large size gloves   immune globulin (human) infusion; Commonly known as: Gammagard   insulin glargine 300 unit/mL (3 mL) injection; Commonly known as: Toujeo   Max Solostar- 2 unit dial; Inject 52 Units under the skin once daily in   the morning. Inject 50 units under skin once daily   insulin lispro 100 unit/mL injection; Commonly known as: HumaLOG KwikPen   Insulin; Use as directed to give up to 100 units a day   ipratropium-albuteroL 0.5-2.5 mg/3 mL nebulizer solution; Commonly known   as: Duo-Neb   KlonoPIN 0.5 mg tablet; Generic drug: clonazePAM   * lacosamide 200 mg tablet tablet; Commonly known as: Vimpat   * lacosamide 50 mg tablet; Commonly known as: Vimpat   levETIRAcetam 750 mg tablet;  Commonly known as: Keppra   levothyroxine 50 mcg tablet; Commonly known as: Synthroid, Levoxyl; Take   1.5 tablets (75 mcg) by mouth once daily in the morning. Take before   meals.   * miconazole 2 % powder; Commonly known as: Micotin   * miconazole 2 % cream; Commonly known as: Micotin   miscellaneous medical supply misc; Bath Wipes  fragrance free   moisturizing mouth solution; Commonly known as: Biotene Oral Dry Mouth   montelukast 10 mg tablet; Commonly known as: Singulair; TAKE ONE TABLET   BY MOUTH EVERY DAY AT BEDTIME   Motegrity 2 mg tablet; Generic drug: prucalopride   nasal spray Nayzilam 5 mg/spray (0.1 mL) spray,non-aerosol; Generic   drug: midazolam   ondansetron ODT 4 mg disintegrating tablet; Commonly known as:   Zofran-ODT; Take 1 tablet (4 mg) by mouth every 8 hours if needed for   nausea or vomiting.   OneTouch Delica Plus Lancet 33 gauge misc; Generic drug: lancets   OneTouch Verio test strips strip; Generic drug: blood sugar diagnostic   polyethylene glycol 17 gram/dose powder; Commonly known as: Glycolax,   Miralax   propranolol LA 60 mg 24 hr capsule; Commonly known as: Inderal LA; Take   1 capsule (60 mg) by mouth once daily. Do not crush, chew, or split.   Protonix 40 mg EC tablet; Generic drug: pantoprazole   Reyvow 100 mg tablet; Generic drug: lasmiditan; Take 100 mg by mouth if   needed (migraine). Do not drive in 8 hours of taking this medicine.   Maximum one dose per 24 hours.   rOPINIRole 0.5 mg tablet; Commonly known as: Requip   senna 8.6 mg tablet; Generic drug: sennosides   tiZANidine 2 mg tablet; Commonly known as: Zanaflex   Ubrelvy 100 mg tablet tablet; Generic drug: ubrogepant; Take 1 tablet   (100 mg) by mouth if needed (migraine). May repeat in 2 hours for max of   200mg per 24 hours.   Viactiv 650 mg-12.5 mcg-40 mcg chewable tablet; Generic drug:   calcium-vitamin D3-vitamin K   ZINC ORAL  * This list has 6 medication(s) that are the same as other medications   prescribed for  you. Read the directions carefully, and ask your doctor or   other care provider to review them with you.       Outpatient Follow-Up  Future Appointments   Date Time Provider Department [place]   12/20/2024 11:30 AM Loyda FitzpatrickD [id number] [facility]   12/23/2024  9:30 AM NEUROSURGERY [facility] NURSE [id number] [facility] [address]   1/7/2025  4:00 PM Isidoro Mensah DO [id number] [facility]   1/20/2025 12:45 PM Avelino Tafoya MD [id number] [facility]   2/6/2025  3:45 PM Rob Mederos MD PhD [id number] [facility] [address]   4/3/2025 12:00 PM Marah Felix MD [id number] [facility]       Saulo Hurtado PA-C

## 2024-12-11 NOTE — PROGRESS NOTES
Department of Neurosurgery  Daily Progress Note    Patient Name: Jonathan Ayala  MRN: [id number]  Date:  12/11/24     Subjective  Patient is a 48 year old female currently presenting POD#1 s/p right cranial wound revision with Dr. Tafoya. Patient states she is doing well. Some slight incisional pain though no headaches, nausea, vision changes, new numbness or weakness. She does state she had a small amount of blood drainage from her incision last evening. No other acute complaints or concerns at this time.    Objective    Vital Signs  /85   Pulse 95   Temp 35.5 °C (95.9 °F)   Resp 18   SpO2 100%      Physical Exam  Constitutional: A&Ox3, calm and cooperative, NAD.  Head: incision visualized on right scalp, appears well approximated with suture, no active drainage noted  Eyes: PERRL, EOMI.   ENMT: Moist mucous membranes, no apparent injuries or lesions.  Neurological:   A&Ox3  Face symmetric, Facial SILT   Tongue midline and symmetric  RUE D5 / B5 / T5 / HG 5/ IO 5  LUE D5 / B5 / T5 / HG 5/ IO 5  RLE HF5 / KE 5/ DF 5/ PF 5  LLE HF5 / KE 5/ DF 5/ PF 5  No clonus, neg Gonzalez  SILT  Psychological: Appropriate mood and behavior.   Skin: Warm and dry.     Diagnostics   Results for orders placed or performed during the hospital encounter of 12/09/24 (from the past 24 hours)   POCT GLUCOSE   Result Value Ref Range    POCT Glucose 161 (H) 74 - 99 mg/dL   POCT GLUCOSE   Result Value Ref Range    POCT Glucose 239 (H) 74 - 99 mg/dL   POCT GLUCOSE   Result Value Ref Range    POCT Glucose 271 (H) 74 - 99 mg/dL     *Note: Due to a large number of results and/or encounters for the requested time period, some results have not been displayed. A complete set of results can be found in Results Review.     CT head wo IV contrast    Result Date: 12/9/2024  Interpreted By:  Elkin Joyce, STUDY: CT HEAD WO IV CONTRAST;  12/9/2024 4:58 pm   INDICATION: Signs/Symptoms:headaches, had recent shunt revision, eval for  ventriculomegaly.   COMPARISON: November 15   ACCESSION NUMBER(S): [id number]   ORDERING CLINICIAN: JIMY CONDON   TECHNIQUE: Noncontrast enhanced CT brain   FINDINGS: * Right frontal ventricular catheter unchanged in position within a non dilated ventricular system. *Unchanged gliosis or encephalomalacia surrounds the catheter within the right frontal lobe of the brain *No evidence of hemorrhage or extra-axial collection       * There is no measurable change compared to the previous exam.   MACRO: none   Signed by: Elkin Joyce 12/9/2024 5:03 PM Dictation workstation:   [id number]    XR shunt series    Result Date: 12/9/2024  Interpreted By:  Boo Davis, STUDY: XR SHUNT SERIES; 12/9/2024 4:40 pm   INDICATION: Signs/Symptoms:recent shunt revision, headaches.   COMPARISON: 11/07/2024.   ACCESSION NUMBER(S): [id number]   ORDERING CLINICIAN: JIMY CONDON   FINDINGS: Two views of the skull in AP and lateral projection, a single AP view of the chest and a single AP view of the abdomen were obtained. A right parietal ventriculostomy shunt catheter is present. Shunt tubing is seen extending over the  right lateral skull,  right neck, midline anterior chest and is seen to coil over the  left lower quadrant. The shunt catheter is mildly tortuous over the left abdomen and pelvis. No discontinuity. Allowing for low lung volumes, no focal infiltrate, pleural effusion or pneumothorax identified.  There is a nonobstructive bowel gas pattern present.  No evidence of acute fracture is identified.  The visualized paranasal sinuses are clear. Right chest port with terminus at the right atrium.       1.  Right parietal ventriculostomy shunt catheter, as described above. There is mild tortuosity of the catheter in the left abdomen and pelvis, kinking in this region can not be excluded. 2.  Allowing for low lung volumes, no focal infiltrate or pneumothorax. 3.  Nonobstructive bowel gas pattern.   Signed by: Boo  Ryan 12/9/2024 4:48 PM Dictation workstation:   [id number]    CT head wo IV contrast    Result Date: 11/15/2024  Interpreted By:  Rob Mendes, STUDY: CT HEAD WO IV CONTRAST;  11/15/2024 4:59 pm   INDICATION: Signs/Symptoms:Shunt revision.   COMPARISON: 11/15/2024 time 8:19 a.m.   ACCESSION NUMBER(S): [id number]   ORDERING CLINICIAN: CHILANGO ALVAREZ   TECHNIQUE: Axial CT images of the head were obtained without intravenous contrast administration.   FINDINGS: There is again evidence of a right frontal  shunt catheter extending of the right lateral ventricle with the tip terminating near the region of the right foramen Monro similar in position when compared with the prior study dated 11/15/2024 time 8:19 a.m.. There is new extracranial air and soft tissue swelling surrounding the right frontal  shunt catheter in keeping with the patient's clinical history of shunt revision.   There is again evidence of similar hypodensity surrounding the shunt tract within the right frontal lobe compatible with a component of surrounding gliosis.   The ventricular system remains nondilated without evidence of hydrocephalus.   No hyperdense acute intracranial hemorrhage is noted.   There is no midline shift.   The visualized paranasal sinuses and mastoid air cells are clear.       There is again evidence of a right frontal  shunt catheter extending of the right lateral ventricle with the tip terminating near the region of the right foramen Monro similar in position when compared with the prior study dated 11/15/2024 time 8:19 a.m.. There is new extracranial air and soft tissue swelling surrounding the right frontal  shunt catheter in keeping with the patient's clinical history of shunt revision.   There is again evidence of similar hypodensity surrounding the shunt tract within the right frontal lobe compatible with a component of surrounding gliosis.   The ventricular system remains nondilated without evidence of  hydrocephalus.   No hyperdense acute intracranial hemorrhage is noted.   MACRO: None.   Signed by: Rob Mendes 11/15/2024 5:51 PM Dictation workstation:   [id number]    CT head wo IV contrast volumetric surgical planning    Result Date: 11/15/2024  Interpreted By:  Jasen Antoine, STUDY: CT HEAD WITHOUT CONTRAST VOLUMETRIC SURGICAL PLANNING;  11/15/2024 8:31 am   INDICATION: Signs/Symptoms:preop.   COMPARISON: 11/07/2024 head CT.   ACCESSION NUMBER(S): [id number]   ORDERING CLINICIAN: DAYANARA CHRIS   TECHNIQUE: Axial noncontrast head CT   FINDINGS: Right frontal ventriculostomy tube terminates near the foramen of Monro without significant change compared to the prior exam with hypodensity surrounding the parenchymal tract perhaps due to gliosis and/or minimal edema. No hydrocephalus. No other territorial loss of gray-white differentiation. Visualized paranasal sinuses and mastoid air cells are clear.       Stable examination compared with 11/07/2024   Signed by: Jasen Antoine 11/15/2024 8:44 AM Dictation workstation:   [id number]    XR chest 1 view    Result Date: 11/15/2024  Interpreted By:  Kia Blue and Bartolomei Aguilar Christopher STUDY: XR CHEST 1 VIEW;  11/15/2024 7:39 am   INDICATION: Signs/Symptoms:preop for OR today.   COMPARISON: Chest radiograph 11/07/2024   ACCESSION NUMBER(S): [id number]   ORDERING CLINICIAN: DAYANARA CHRIS   FINDINGS: Standing PA view of the chest was provided.   Right ventricular peritoneal shunt catheter overlies the right lateral neck, right hemithorax and crosses below into the abdomen extending beyond the field of view. Right chest wall MediPort with distal tip overlying the right atrium. Left subclavian approach central venous catheter with distal tip overlying the innominate vein. Surgical clip overlies the right upper quadrant.   CARDIOMEDIASTINAL SILHOUETTE: Cardiomediastinal silhouette is normal in size and configuration.   LUNGS: Mild interstitial prominence. No focal  parenchymal consolidation. No pleural effusion or pneumothorax.   ABDOMEN: No remarkable upper abdominal findings.   BONES: No acute osseous changes.       1.  No evidence of acute cardiopulmonary process. 2. Medical lines and devices as discussed above.   I personally reviewed the images/study and I agree with the findings as stated by Bernabe Nguyễn MD (Radiology Resident).   MACRO: None   Signed by: Kai Blue 11/15/2024 8:17 AM Dictation workstation:   [id number]      Current Medications  Scheduled medications  amitriptyline, 100 mg, oral, Nightly  cephalexin, 500 mg, oral, q6h OLESYA  clonazePAM, 0.5 mg, oral, BID  divalproex, 500 mg, oral, BID  ezetimibe, 10 mg, oral, Daily  fluticasone, 2 spray, Each Nostril, Daily  fluticasone furoate-vilanteroL, 1 puff, inhalation, Daily  furosemide, 20 mg, oral, BID  insulin lispro, 0-5 Units, subcutaneous, TID AC  ipratropium-albuteroL, 3 mL, nebulization, q6h  lacosamide, 200 mg, oral, BID  lacosamide, 50 mg, oral, BID  levETIRAcetam, 1,500 mg, oral, BID  levothyroxine, 75 mcg, oral, Daily before breakfast  montelukast, 10 mg, oral, Nightly  pantoprazole, 40 mg, oral, BID AC  polyethylene glycol, 17 g, oral, BID  propranolol LA, 60 mg, oral, Daily  sennosides-docusate sodium, 2 tablet, oral, BID      Continuous medications     PRN medications  PRN medications: EPINEPHrine HCl (PF), naloxone, ondansetron **OR** ondansetron, oxyCODONE, oxyCODONE, oxyCODONE     Assessment/Plan  Jonathan Ayala is a 48 y.o. female presenting POD#1 s/p right cranial wound revision.     -   - Encourage ambulation, pending PT/OT assessment post operatively   - Monitor VS/pulse ox [id number]   - Monitor AM CBC/RFP    # Acute postoperative pain  - Well controlled per patient, continue current regimen  - Pain assessments [id number]       FEN/GI:  - Monitor/replete electrolytes PRN   - IVF - discontinue today   - Continue diet   - GI protection with Protonix 40mg daily   - Bowel  regimen: Scheduled Miralax and pericolace daily        Disposition: likely home with wound care        Saulo Hurtado PA-C    Neurologic Surgery   [place]  Team Pager #[phone]

## 2024-12-11 NOTE — ANESTHESIA POSTPROCEDURE EVALUATION
Patient: Jonathan Ayala    Procedure Summary       Date: 12/10/24 Room / Location: [facility] OR 10 / [facility] OR    Anesthesia Start: 1116 Anesthesia Stop: 1305    Procedure: Right cranial wound exploration, washout, and revision (Right: Head) Diagnosis:       Idiopathic intracranial hypertension      (Idiopathic intracranial hypertension [G93.2])    Surgeons: Avelino Tafoya MD Responsible Provider: Jaspal Pace MD    Anesthesia Type: general ASA Status: 3            Anesthesia Type: general    Vitals Value Taken Time   /64 12/10/24 1530   Temp 37.1 °C (98.8 °F) 12/10/24 1530   Pulse 100 12/10/24 1531   Resp 16 12/10/24 1530   SpO2 94 % 12/10/24 1531   Vitals shown include unfiled device data.    Anesthesia Post Evaluation    Patient location during evaluation: PACU  Patient participation: complete - patient participated  Level of consciousness: awake and alert  Pain score: 4  Pain management: adequate  Airway patency: patent  Cardiovascular status: acceptable  Respiratory status: acceptable  Hydration status: acceptable  Postoperative Nausea and Vomiting: none    No notable events documented.

## 2024-12-12 ENCOUNTER — PATIENT OUTREACH (OUTPATIENT)
Dept: PRIMARY CARE | Facility: CLINIC | Age: 49
End: 2024-12-12
Payer: MEDICAID

## 2024-12-12 DIAGNOSIS — E11.9 TYPE 2 DIABETES MELLITUS WITHOUT COMPLICATION, UNSPECIFIED WHETHER LONG TERM INSULIN USE (MULTI): ICD-10-CM

## 2024-12-12 RX ORDER — INSULIN GLARGINE 300 [IU]/ML
INJECTION, SOLUTION SUBCUTANEOUS
Qty: 6 ML | Refills: 6 | Status: SHIPPED | OUTPATIENT
Start: 2024-12-12

## 2024-12-13 ENCOUNTER — PATIENT OUTREACH (OUTPATIENT)
Dept: PRIMARY CARE | Facility: CLINIC | Age: 49
End: 2024-12-13
Payer: MEDICAID

## 2024-12-13 ENCOUNTER — TELEPHONE (OUTPATIENT)
Dept: PRIMARY CARE | Facility: CLINIC | Age: 49
End: 2024-12-13
Payer: MEDICAID

## 2024-12-13 PROCEDURE — RXMED WILLOW AMBULATORY MEDICATION CHARGE

## 2024-12-13 NOTE — TELEPHONE ENCOUNTER
----- Message from [employer] sent at 12/13/2024 10:36 AM EST -----  Regarding: TCM/ No contact  Discharge Facility: [facility]   Discharge Diagnosis: Idiopathic Intracranial Hypertension   Admission Date: 12-9-24  Discharge Date: 12-11-24       Unsuccessful attempts x2 to reach patient for PCP Follow-up  Please have office staff reach out to patient and schedule an appointment within 14 days from discharge date.

## 2024-12-14 LAB
BACTERIA BLD CULT: NORMAL
BACTERIA BLD CULT: NORMAL

## 2024-12-16 ENCOUNTER — APPOINTMENT (OUTPATIENT)
Dept: NEUROSURGERY | Facility: CLINIC | Age: 49
End: 2024-12-16
Payer: MEDICAID

## 2024-12-16 ENCOUNTER — OFFICE VISIT (OUTPATIENT)
Dept: OPHTHALMOLOGY | Facility: CLINIC | Age: 49
End: 2024-12-16
Payer: MEDICAID

## 2024-12-16 ENCOUNTER — SPECIALTY PHARMACY (OUTPATIENT)
Dept: PHARMACY | Facility: CLINIC | Age: 49
End: 2024-12-16

## 2024-12-16 DIAGNOSIS — G93.2 BENIGN INTRACRANIAL HYPERTENSION: Primary | ICD-10-CM

## 2024-12-16 DIAGNOSIS — Z79.4 TYPE 2 DIABETES MELLITUS WITH HYPERGLYCEMIA, WITH LONG-TERM CURRENT USE OF INSULIN: ICD-10-CM

## 2024-12-16 DIAGNOSIS — H47.013 NAION (NON-ARTERITIC ANTERIOR ISCHEMIC OPTIC NEUROPATHY), BOTH EYES: ICD-10-CM

## 2024-12-16 DIAGNOSIS — E11.65 TYPE 2 DIABETES MELLITUS WITH HYPERGLYCEMIA, WITH LONG-TERM CURRENT USE OF INSULIN: ICD-10-CM

## 2024-12-16 PROCEDURE — 99214 OFFICE O/P EST MOD 30 MIN: CPT | Performed by: PSYCHIATRY & NEUROLOGY

## 2024-12-16 PROCEDURE — 92083 EXTENDED VISUAL FIELD XM: CPT | Performed by: PSYCHIATRY & NEUROLOGY

## 2024-12-16 PROCEDURE — 92133 CPTRZD OPH DX IMG PST SGM ON: CPT | Performed by: PSYCHIATRY & NEUROLOGY

## 2024-12-16 RX ORDER — PEN NEEDLE, DIABETIC 30 GX3/16"
NEEDLE, DISPOSABLE MISCELLANEOUS
Qty: 200 EACH | Refills: 9 | Status: SHIPPED | OUTPATIENT
Start: 2024-12-16 | End: 2024-12-17 | Stop reason: SDUPTHER

## 2024-12-16 ASSESSMENT — CUP TO DISC RATIO
OD_RATIO: 0.15
OS_RATIO: 0.15

## 2024-12-16 ASSESSMENT — ENCOUNTER SYMPTOMS
GASTROINTESTINAL NEGATIVE: 0
RESPIRATORY NEGATIVE: 0
HEMATOLOGIC/LYMPHATIC NEGATIVE: 0
CONSTITUTIONAL NEGATIVE: 0
NEUROLOGICAL NEGATIVE: 0
MUSCULOSKELETAL NEGATIVE: 0
EYES NEGATIVE: 1
CARDIOVASCULAR NEGATIVE: 0
PSYCHIATRIC NEGATIVE: 0
ENDOCRINE NEGATIVE: 0
ALLERGIC/IMMUNOLOGIC NEGATIVE: 0

## 2024-12-16 ASSESSMENT — CONF VISUAL FIELD
OS_INFERIOR_NASAL_RESTRICTION: 1
OS_INFERIOR_TEMPORAL_RESTRICTION: 1
OS_SUPERIOR_NASAL_RESTRICTION: 1
OD_SUPERIOR_NASAL_RESTRICTION: 1
OD_INFERIOR_NASAL_RESTRICTION: 1
OD_INFERIOR_TEMPORAL_RESTRICTION: 1

## 2024-12-16 ASSESSMENT — VISUAL ACUITY
OS_PH_SC: 20/200
OD_PH_SC: 20/200-2
METHOD: SNELLEN - LINEAR
OD_SC: HM
OS_SC: 20/400-1

## 2024-12-16 ASSESSMENT — SLIT LAMP EXAM - LIDS
COMMENTS: NORMAL
COMMENTS: NORMAL

## 2024-12-16 ASSESSMENT — EXTERNAL EXAM - LEFT EYE: OS_EXAM: NORMAL

## 2024-12-16 ASSESSMENT — TONOMETRY
OS_IOP_MMHG: 14
IOP_METHOD: GOLDMANN APPLANATION
OD_IOP_MMHG: 14

## 2024-12-16 ASSESSMENT — EXTERNAL EXAM - RIGHT EYE: OD_EXAM: NORMAL

## 2024-12-16 NOTE — PROGRESS NOTES
Assessment and Plan    06/08/2021 +OKN response OD  09/30/2019 +OKN response OD    12/09/2024 CT head without contrast, which I personally reviewed, shows the right frontal ventriculoperitoneal shunt catheter.  11/15/2024 CT head without contrast, which I personally reviewed, shows the right frontal ventriculoperitoneal shunt catheter.    Interval head imaging.    10/29/2024 CT head without contrast, which I personally reviewed, shows the right frontal ventriculoperitoneal shunt catheter.  10/23/2024 MRI brain & orbits with contrast, which I personally reviewed, shows the right frontal ventriculoperitoneal shunt catheter.  10/23/2024 CT head without contrast, which I personally reviewed, shows the right frontal ventriculoperitoneal shunt catheter.    09/24/2024 CT head without contrast, which I personally reviewed previously, shows interval placement of a right frontal approach ventriculoperitoneal shunt.  09/23/2024 CT head without contrast, which I personally reviewed previously, shows no lesion.  09/21/2024 MRI brain without contrast, which I personally reviewed previously, shows stable findings.  09/21/2024 CT head without contrast & CTA head & neck, which I personally reviewed previously, shows the right frontal encephalomalacia.  09/17/2024 MRI brain & orbits with contrast & MRV head, which I personally reviewed previously, show stable right frontal encephalomalacia.  09/02/2024 CT head without contrast, which I personally reviewed previously, shows right frontal encephalomalacia stable from prior.  08/28/2024 CTA head and neck & CT head without contrast, which I personally reviewed previously, show right frontal encephalomalacia stable from prior.  08/06/2024 CTA head & neck & CT head without contrast, which I personally reviewed previously, show right frontal encephalomalacia stable from prior.  07/09/2024 CT head without contrast, which I personally reviewed previously, shows stable right frontal  "encephalomalacia.  06/05/2024 MRI orbits with contrast, which I personally reviewed previously, shows right frontal encephalomalacia similar to prior imaging.  11/07/2023 MRI brain without contrast, by report from [facility], shows \"No acute intracranial abnormality including no evidence of an acute or recent brain parenchymal infarct. \"  11/07/2023 CTA head & neck & CT head without contrast, by report from [facility], show \"No acute abnormality of the cervical and intracranial vasculature.\" And \"No evidence of an acute intracranial process.  Focal RIGHT frontal lobe encephalomalacia deep to a sherley hole, new from 02/25/2020. \"  08/01/2023 MRV head, which I personally reviewed previously, shows no lesion.  07/29/2023 MRI brain & orbits with contrast, which I personally reviewed previously, shows right frontal encephalomalacia consistent with prior bolt.  Interval head imaging.  10/05/2022 CT head without contrast & CTA head & neck, by report from [place], show \" 1.   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  Overlying right-sided sherley hole.)  2.  Prominence of the ventricles and sulci for age.  \" and \"1. Similar appearing old areas of infarction involving the right and left frontal lobes extending to the convexities with underlying encephalomalacia at site of prior hemorrhage from 02/13/2022. Overlying right-sided sherley hole. Prominence of the ventricles and sulci for age. No evidence of acute infarction. No active hemorrhage. 2. No significant stenosis internal carotid arteries. 3. No significant stenosis of the intracranial vessels or evidence of aneurysm. 4. Aberrant right subclavian artery behind the esophagus with no dilation of the proximal esophagus.\"  09/25/2022 CT head without contrast, which I personally reviewed previously, shows no " lesion.  04/20/2022 CT head without contrast, which I personally reviewed previously, shows right frontal encephalomalacia judged stable by Radiology.  Interval head imaging.  09/10/2021 MRI brain with contrast, which I personally reviewed previously, shows no lesion.  09/09/2021 CTA head & neck, which I personally reviewed previously, shows no lesion.  09/09/2021 CT head without contrast, which I personally reviewed previously, shows no lesion.  08/11/2021 MRI brain & orbits with contrast & MRV head, which I personally reviewed previously, show left optic atrophy with Radiology noting “Punctate focus of enhancement seen along the left 7th-8th cranial nerve bundle at the level of the porus acusticus, indeterminate. Otherwise unremarkable MRI of the brain.”  Interval head imaging.  06/04/2021 MRI brain & orbits with contrast, which I personally reviewed previously, shows left optic atrophy.  Interval head imaging.  06/29/2017 MRI brain without contrast, which I personally reviewed previously, shows no lesion.  11/22/2007 CT head without contrast, which I personally reviewed previously, shows no lesion.    11/15/2024 RBC 3, WBC 1, protein 67 & glucose 92.  11/07/2024 , WBC 6, protein 69 & glucose 107.  09/18/2024 lumbar puncture tube 1: RBC 1000, WBC 6, tube 4:  & WBC 5, protein 79 & glucose 154. IgG studies with high albumin and albumin index with high cerebrospinal fluid (CSF) IgG/albumin ratio. Oligoclonal bands negative. Cytology negative. Flow cytometry negative. Encephalopathy autoimmune evaluation negative. Meningitis pathogen panel, HSV PCR, VZV PCR, EBV PCR, toxoplasma PCR, West Nile IgG & IgM, VDRL, fungal smear negative.  08/31/2023 lumbar puncture opening pressure 52 cm water, tube 1: , WBC 0, tube 4: RBC 6 & WBC 0, protein 91 & glucose 71.  08/11/2021 lumbar puncture opening pressure 18 cm water, tube 1: RBC 0, WBC 16 (86% L, 13% M), tube 4: RBC 0 & WBC 16 (92% L, 8% M), protein 71 &  glucose 61. Cytology negative. Flow cytometry negative. Oligoclonal bands 0. IgG studies with high CSF IgG & albumin.  08/05/2020 lumbar puncture opening pressure 20 cm water, protein 68, glucose 85. MBP wnl. Oligoclonal bands POSITIVE 4.  12/26/2019 lumbar puncture no opening pressure checked per patient) tube ?: RBC 39, WBC 6 (91% L, 9% M), tube ?: RBC 38, WBC 5, protein 89, glucose 79. (via [employer] from [employer])  11/13/2019 lumbar puncture opening pressure 22 cm water, tube 1: RBC 9, WBC 4, tube 4: RBC 1 & WBC 5, protein 82 & glucose 61. Oligoclonal bands 0. IgG studies with non-specific abnormalities. HSV PCR negative.  09/20/2019 lumbar puncture opening pressure 20 cm water, RBC 1, WBC 7 (96% L, 4% M), protein 94 & glucose 78.  01/31/2015 lumbar puncture RBC 2, WBC 0, protein 104 & glucose 74.    08/18/2024 Electrodiagnostic testing.  ffERG: Normal (OU).  Responses of L-/M-cones and S-cones: No abnormality can be detected (OU).  Responses of On- and Off-bipolar cells in cone pathway: Normal (OU).  PhNR (RGC function):  OD: Amplitude reduces mildly and implicit time prolongs mildly.  OS: Amplitude reduces moderately and implicit time prolongs mildly.  mfERG: No abnormality can be found (OU).  PVEP:  OD: Normal.  OS: Amplitude reduces severely.  Hemifield PVEP:  OD: Left-field PVEP amplitude is significantly lower than right-field PVEP.  OS: PVEP of both sides show no detectable components.  07/27/2019 multifocal ERG with mildly reduced central responses.  Pattern VEP with OD borderline latency at 0.25 degrees and OS with severely prolonged latencies and decreased amplitudes.    Lab Results   Component Value Date/Time    SEDRATE 40 (H) 12/09/2024 1615    SEDRATE 21 (H) 11/15/2024 0552    SEDRATE 36 (H) 11/06/2024 2258    SEDRATE 19 08/01/2023 1558    SEDRATE 33 (H) 08/07/2022 0322    SEDRATE 19 05/25/2022 1501    SEDRATE 3 06/05/2020 1110    SEDRATE 6 05/13/2020 1529    SEDRATE 3 03/28/2020 0629    SEDRATE 5  09/16/2019 0507    SEDRATE 8 08/19/2019 1314    CRP 1.00 (H) 12/09/2024 1615    CRP 1.19 (H) 11/15/2024 0552    CRP 1.36 (H) 11/06/2024 2258    CRP 0.90 08/07/2022 0322    CRP 0.35 05/25/2022 1501    CRP 0.34 09/23/2021 0618    CRP 0.71 09/21/2021 0648    CRP 0.14 07/16/2020 0944    CRP 0.10 06/05/2020 1110    CRP <0.10 05/13/2020 1529    CRP <0.10 03/28/2020 0629    CRP 3.64 (A) 11/21/2019 2157    CRP <0.10 08/19/2019 1314      Lab Results   Component Value Date/Time    BVIFNTPA78 383 09/17/2024 0242    ZSWSWDKD13 299 02/23/2023 0941    JWZZKIMS56 709 02/09/2021 0451    ZOOAZEYI95 508 12/10/2019 1349    FOLATE >24.0 09/17/2024 0242    RCFOL 951 12/10/2019 1349    KVAH5YJ 142 09/17/2024 0250    VTAI Normal 09/17/2024 0242    VITAMINA 0.53 09/17/2024 0242    VTAR 0.02 09/17/2024 0242      Lab Results   Component Value Date/Time    NMOAQP4 Negative 09/17/2024 0242    NMOAQP4 Negative 11/14/2019 1447    MOGFACS Negative 09/17/2024 0242    MOGFACS Negative 11/10/2020 1000    MOGFACS Negative 11/14/2019 1447    ACE 41 09/17/2024 0242      Tuberculosis studies  Lab Results   Component Value Date/Time    TBSIN Negative 09/17/2024 0250    TBSIN Negative 09/22/2021 0430    TBSIN Negative 11/10/2020 1000    TBSIN Negative 11/14/2019 0531      Lab Results   Component Value Date/Time    IOQKCCIC95 383 09/17/2024 0242    PUGWEIOL97 299 02/23/2023 0941    MDEUPJUQ56 709 02/09/2021 0451    AFYCDQRL91 508 12/10/2019 1349    FOLATE >24.0 09/17/2024 0242    RCFOL 951 12/10/2019 1349    RUAL3CY 142 09/17/2024 0250    VTAI Normal 09/17/2024 0242    VITAMINA 0.53 09/17/2024 0242    VTAR 0.02 09/17/2024 0242      09/17/2024 syphilis REACTIVE. Treponema confirm & RPR non-reactive (NR). T-SPOT TB negative. Bartonella abs, Lyme PCR, RMSF abs, Toxoplasma IgG & IgM negative.  12/04/2023 ESR 10. CRP <0.3 mg/dL.  10/01/2023 syphilis non-reactive (NR).  09/18/2020 ESR 1. CRP < 0.08 mg/dL (0.8 mg/L).  08/28/2020 HbA1c HIGH 7.0. Lipid panel with  LDL 64.  08/05/2020 B12 462. Folate wnl. AQP4 ab negative.  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 Suarez.  07/09/2019 BRETT > 1:640. Anti-centromere HIGH 101 (0-40). scl-70 negative. Chromatin ab IgG, anti-ribosomal ab, anti-Sarahy ab, anti-DNA ab negative.    12/16/2024 OCT RNFL OD 48 & OS 38.  11/05/2024 OCT RNFL OD 81 with T & I thinning & OS 37. (Lower OD & stable OS)  10/23/2024 OCT RNFL  & OS 41. (Lower OD & stable OS)  10/03/2024 OCT RNFL  & OS 39. (Lower OD & stable OS)  09/16/2024 OCT RNFL  & OS 36. (Interval new elevation OD & stable thinning OS)  07/25/2024 OCT RNFL OD 89 & OS 37. (Decrease, now at baseline of early 2024)  06/26/2024 OCT RNFL OD 95 & OS 41. (Stable)  06/05/2024 OCT RNFL OD 95 & OS 40. (Stable)  OCT macula OD normal foveal contour 264 & OS normal foveal contour 234.  03/15/2024 OCT RNFL OD 92 & OS 36. (Stable)  01/09/2024 OCT RNFL OD 89 & OS 36. (Stable)  02/06/2023 OCT RNFL OD 92 & OS 38. (stable)  OCT macula OD normal foveal contour 255 & OS thinning RNFL miscentered wrt fovea.  10/07/2022 OCT RNFL OD 92 & OS 40. (decreased OD & stable OS)  09/23/2022 OCT RNFL OD 98 & OS 38. (increased OD & stable OS)  01/27/2022 OCT RNFL OD 88 & OS 37. (stable)  10/06/2021 OCT RNFL OD 93 & OS 39 (stable)..  08/19/2021 OCT RNFL OD 92 & OS 38. (stable)  06/08/2021 OCT RNFL OD 87 & OS 37. (stable)  01/28/2021 OCT RNFL OD 85 & OS 37. (stable)  11/06/2020 OCT RNFL OD 89 & OS 39. (stable)  07/27/2020 OCT RNFL OD 89 & OS 38. (stable)  01/07/2020 OCT RNFL OD 93 & OS 38. (stable to mild increase OD, stable to mild decrease OS)  11/27/2019 OCT RNFL OD 84 & OS 42. (stable to mild OD decrease)  09/30/2019 OCT RNFL OD 89 & OS 41. (stable)  07/18/2019 OCT RNFL OD 90 & OS 39.  OCT macula OD normal foveal contour 256 & OS thinning of RNFL & GCL, but preserved foveal contour 238.    12/16/2024 HVF 24-2 OD stimulus V, fovea <0, FL 7/12, FL 0/6, FN 1/5, generalized depression  with some relative superior temporal sparing & OS stimulus V, fovea 9, FL 0/16, FP 0/10, FN 6/9, generalized depression.  11/05/2024 HVF 24-2 OD stimulus V, fovea <0, inferior altitudinal & superior arcuate (some superior temporal sparing) & OS stimulus V, fovea 22, central, superior nasal step & inferior arcuate defects.  10/03/2024 HVF 24-2 OD fovea 4, generalized depression MD -29.75 & OS stimulus V, fovea 12, central, superior nasal step & inferior arcuate defects.  09/16/2024 HVF 24-2 OD stimulus III, fovea 14, generalized depression MD -31.43 & OS stimulus V, fovea 1, generalized depression with some superior temporal sparing.  07/25/2024 HVF 24-2 OD stimulus V, fovea 39, superior > inferior nasal step & OS stimulus V, fovea 18, generalized depression with some superior temporal sparing (inferior > superior altitudinal.  06/26/2024 HVF 24-2 OD fovea 33, FL 3/15, FP 0%, FN 20%, superior arcuate MD -13.69 & OS stimulus V, fovea 27, generalized depression.  06/05/2024 HVF 24-2 OD fovea 34, FL 2/16, FP 0%, FN 40%, superior altitudinal MD -18.00 & OS stimulus V, fovea 25, generalized depression.  03/15/2024 HVF 24-2 OD fovea 36, wnl MD -1.05 & OS stimulus V, fovea 3, inferior arcuate > superior arcuate.  01/09/2024 HVF 24-2 OD wnl MD -0.94 & OS generalized depression MD -32.10.  02/06/2023 HVF 24-2 OD fovea 36, FL 1/17, FP 0%< FN 15%, scatter MD -7.58 & OS stimulus V, fovea 19, generalized depression.  10/07/2022 HVF 24-2 OD fovea 35, wnl MD -1.26 & OS fovea 5, generalized depression MD -30.62.  09/23/2022 HVF 24-2 OD fovea 38, scatter MD -2.67 & OS stimulus V, fovea 16, superior arcuate & inferior arcuate.  01/27/2022 HVF 24-2 OD fovea 36, wnl MD -1.67 & OS stimulus V, fovea 28, generalized reduction.  10/06/2021 HVF 24-2 OD fovea 36, scatter MD -4.76 & OS stimulus V, generalized reduction.  08/19/2021 HVF 24-2 OD fovea 39, wnl MD -2.31 & OS stimulus V, fovea 28, generalized depression.  06/08/2021 HVF Stimulus V  OD fovea 34, wnl & OS stimulus V, fovea 24, generalized depression.  01/28/2021 HVF 24-2 OD fovea 40, wnl MD -0.63 & OS stimulus V, fovea 28, superior nasal step & inferior arcuate.  11/06/2020 HVF 24-2 OD fovea 32, possible inferior > superior arcuate MD -14.71 & OS stimulus V, fovea 10, generalized depression.  07/27/2020 HVF 24-2 OD fovea 39, wnl MD -2.45 & OS stimulus V, fovea 27, superior & inferior altitudinal.    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    The near vision remains about the same as in the hospital. The optic disc edema that had resolved as evolved into pallor, the expected change after edema. The question remains about the relative contributions of non-arteritic anterior ischemic optic neuropathy and papilledema from intracranial pressure elevation related to idiopathic intracranial hypertension.     Plan    Shunt care with Dr. Tafoya.  Continue furosemide with possible tapering in a few months.  Vasculopathic risk factor control.  Low vision services.    Follow up in 2-3 months with [facility] visual field (HVF) & OCT. (dilated 6/5/2024)

## 2024-12-17 ENCOUNTER — APPOINTMENT (OUTPATIENT)
Dept: RADIOLOGY | Facility: HOSPITAL | Age: 49
End: 2024-12-17
Payer: MEDICAID

## 2024-12-17 ENCOUNTER — PHARMACY VISIT (OUTPATIENT)
Dept: PHARMACY | Facility: CLINIC | Age: 49
End: 2024-12-17
Payer: MEDICAID

## 2024-12-17 DIAGNOSIS — G25.81 RESTLESS LEGS SYNDROME: ICD-10-CM

## 2024-12-17 DIAGNOSIS — E11.65 TYPE 2 DIABETES MELLITUS WITH HYPERGLYCEMIA, WITH LONG-TERM CURRENT USE OF INSULIN: ICD-10-CM

## 2024-12-17 DIAGNOSIS — Z79.4 TYPE 2 DIABETES MELLITUS WITH HYPERGLYCEMIA, WITH LONG-TERM CURRENT USE OF INSULIN: ICD-10-CM

## 2024-12-17 DIAGNOSIS — M35.01 SJOGREN'S SYNDROME WITH KERATOCONJUNCTIVITIS SICCA (MULTI): ICD-10-CM

## 2024-12-17 DIAGNOSIS — D83.9 CVID (COMMON VARIABLE IMMUNODEFICIENCY): ICD-10-CM

## 2024-12-17 DIAGNOSIS — R56.9 FOCAL SEIZURES (MULTI): ICD-10-CM

## 2024-12-17 DIAGNOSIS — Z91.81 AT HIGH RISK FOR INJURY RELATED TO FALL: ICD-10-CM

## 2024-12-17 RX ORDER — PEN NEEDLE, DIABETIC 30 GX3/16"
NEEDLE, DISPOSABLE MISCELLANEOUS
Qty: 500 EACH | Refills: 2 | Status: SHIPPED | OUTPATIENT
Start: 2024-12-17

## 2024-12-19 DIAGNOSIS — E11.65 TYPE 2 DIABETES MELLITUS WITH HYPERGLYCEMIA, WITH LONG-TERM CURRENT USE OF INSULIN: Primary | ICD-10-CM

## 2024-12-19 DIAGNOSIS — Z79.4 TYPE 2 DIABETES MELLITUS WITH HYPERGLYCEMIA, WITH LONG-TERM CURRENT USE OF INSULIN: Primary | ICD-10-CM

## 2024-12-20 ENCOUNTER — APPOINTMENT (OUTPATIENT)
Dept: ENDOCRINOLOGY | Facility: CLINIC | Age: 49
End: 2024-12-20
Payer: MEDICAID

## 2024-12-20 DIAGNOSIS — Z79.4 TYPE 2 DIABETES MELLITUS WITH HYPERGLYCEMIA, WITH LONG-TERM CURRENT USE OF INSULIN: ICD-10-CM

## 2024-12-20 DIAGNOSIS — E11.65 TYPE 2 DIABETES MELLITUS WITH HYPERGLYCEMIA, WITH LONG-TERM CURRENT USE OF INSULIN: ICD-10-CM

## 2024-12-20 NOTE — PROGRESS NOTES
Clinical Pharmacy Team met with Jonathan Ayala regarding a consultation for diabetes management thanks to a referral from Dr. Marah Felix MD. Below is a summary of our conversation and recommendations:    ________________________________________________________________________      Allergies   Allergen Reactions    Acetazolamide Hives, Rash, Shortness of breath and Swelling     oral hives, redness, ABD pain    Atorvastatin Unknown     Causes muscle pain    Cefdinir Itching, Rash, Shortness of breath and Anaphylaxis     Itchy throat    Throat closing / difficulty breathing.  Took Benadryl at home.    Itchy throat      Throat closing / difficulty breathing.  Took Benadryl at home.    Ceftriaxone Anaphylaxis, Rash, Shortness of breath and Swelling     SOB    SOB hives turned red during gallbladder    Doxepin Anaphylaxis, Hives, Swelling, Angioedema and Rash     Itchy sore throat problems with swallowing    facial swelling    Itchy sore throat problems with swallowing      facial swelling    Duloxetine Anaphylaxis, Hallucinations and Rash     suicidal ideation    suicidal ideation     Other reaction(s): suicidal ideation    suicidal    Suicidal ideation    Levofloxacin Angioedema, Swelling and Rash     eyelid swelling    eyelid swelling. Patient tolerates Avelox    Levofloxacin In D5w Anaphylaxis     Moon face lips swelling just Levaquin tolerated D5W)    Nutritional Supplements Anaphylaxis     Grapes ( red dye    Prochlorperazine Anaphylaxis     HIGH Compazine involuntary muscle spasms low doses tolerated)    Red Dye Anaphylaxis    Becky Anaphylaxis and Swelling     Becky    SOB facial swelling    Rosemary Oil Anaphylaxis, Itching and Swelling     Becky  SOB facial swelling      SOB facial swelling    Strawberry Shortness of breath     White blisters in mouth      Other reaction(s): white blisters in mouth, shortness of breath    Strawberry Flavor Anaphylaxis     Allergy to strawberry fruit and juice     "Sulfa (Sulfonamide Antibiotics) Anaphylaxis, Rash, Shortness of breath and Swelling     SOB itchy hives    Other Reaction(s): rash, SOB      SOB itchy hives    Topiramate Anaphylaxis, Swelling and Angioedema     eyelid swelling    Throat swelling    eyelid swelling  Throat swelling      Throat swelling      eyelid swelling    Tree Nuts Shortness of breath     walnuts    Tree Pollen-Black [place] Shortness of breath     Other Reaction(s): Other: See Comments      White blisters in mouth      blisters in mouth-carries an EPIPEN-\"I have gotten short of breath\"    Tree Pollen-Pecan Shortness of breath     pecans    [id number] Antimigraine Agents Anaphylaxis and Nausea Only     \"[id number]- anti migraine agents and DHE: can cause stroke per pt \"    None due to Raynaud's  ( Triptans cause a stroke)    [place] Shortness of breath    Aripiprazole Unknown, Fever and Other     fever and tremors    Fevers and Tremors    Tremor    Aspartame Diarrhea     Blood sugar [place], Diarrhea    Aspartame (Bulk) Diarrhea, Nausea Only and Nausea/vomiting     Other Reaction(s): nausea, diarrhea, abdominal pain      Blood sugar [place], Diarrhea    Fenofibrate Other     Double vision    Gluten Itching, Other and Rash     Rashes constipation gastric discomfort    Hydrochlorothiazide Hives, Itching and Rash     hctz removed as free-text allergy and entered as [facility] allergy,1455 10/6/2007JO      itchy hives    Hydromorphone Unknown, GI intolerance and Nausea Only     Intolerance nausea instant    Iron Hives and Rash     ichy hive ( can tolerate ferrous gluconate)    Meclizine Angioedema, Other and Swelling     eyelid swelling    Swelling of the eyelids    Eyelids and face    Metformin Other     Other reaction(s): Dyspepsia, Dyspepsia    Propoxyphene Unknown and Hives     Darvocet itchy hives    Statins-Hmg-Coa Reductase Inhibitors Unknown     Double vision    Tetracyclines Hives, Itching and Rash     sulfa    Rash itching    Itchy  rash    " Thiazides Hives, Itching and Rash     itchy hives    Venlafaxine Unknown and Fever     Fevers chills    Wheat Unknown     oral blisters    Adhesive Tape-Silicones Itching and Other    Barbiturates Unknown    Dhe Unknown     Raynaud's disease    Diet Foods Diarrhea     Aspartame allergy only. Removes to many foods to interface.    Ferrous Sulfate Hives    Nsaids (Non-Steroidal Anti-Inflammatory Drug) Unknown and Nausea/vomiting    Other Unknown    Sulfonylureas Unknown    Tobramycin Unknown    Vancomycin Unknown and Hives     Niyah syndrome    Other reaction(s): Other    Red man syndrome if given fast, benadryl with.     Niyah syndrome  Other reaction(s): Other      Other reaction(s): Other      Red man syndrome if given fast, benadryl with.    Adhesive Rash    Azithromycin Hives, Itching and Rash    Betamethasone Nausea And Vomiting, Other, GI intolerance and Diarrhea     N/V/D    House Dust Rash    Metoclopramide Hcl Other    Milk Unknown, Itching and Rash     ABD pain    Prednisone Rash    Propoxyphene-Acetaminophen Hives and Rash    Sulfacetamide Sodium Rash and Swelling    Zolpidem Other and Hallucinations     Sleep walk    Other Reaction(s): insomnia and agitation      Sleep walk         Diabetes Pharmacotherapy:   Toujeo 54 units subcutaneous once daily  Humalog with an ICR of 1:2 with breakfast and  and ISF of 1:30    Patient reports tolerating this regimen well.      Lab Review  Lab Results   Component Value Date    BILITOT 0.3 08/06/2023    CALCIUM 9.5 08/06/2023    CO2 23 08/06/2023     08/06/2023    CREATININE 0.82 08/06/2023    GLUCOSE 135 (H) 08/06/2023    ALKPHOS 81 08/06/2023    K 4.2 08/06/2023    PROT 8.8 (H) 08/06/2023     08/06/2023    AST 21 08/06/2023    ALT 27 08/06/2023    BUN 17 08/06/2023    ANIONGAP 16 08/06/2023    MG 2.16 07/28/2023    PHOS 3.8 04/30/2023    LDH 95 07/17/2020    ALBUMIN 4.3 08/06/2023    LIPASE 20 04/30/2023    GFRF 89 08/06/2023    GFRMALE CANCELED  2023     Lab Results   Component Value Date    TRIG CANCELED 2023    CHOL CANCELED 2023    HDL CANCELED 2023     Lab Results   Component Value Date    HGBA1C 5.9 (H) 2023    HGBA1C 7.8 (A) 2023    HGBA1C 8.3 (A) 2022     The 10-year ASCVD risk score (Mamie SANDERSON, et al., 2019) is: 3.4%    Values used to calculate the score:      Age: 48 years      Sex: Female      Is Non- : No      Diabetic: Yes      Tobacco smoker: No      Systolic Blood Pressure: 146 mmHg      Is BP treated: Yes      HDL Cholesterol: 46.3 mg/dL      Total Cholesterol: 186 mg/dL          Assessment/Plan     The patient reports today for a diabetes consultation. Reviewed CGM report and discussed it with the patient. Today we did an omnipod 5 pump training. Discussed major training points. Reviewed points listed in omnipod 5 training check list. Answered all patient questions. Patient was able to successfully self apply her omnipod 5 with dexcom g6. Will follow up with patient.       PATIENT EDUCATION/GOALS    Goals  Fasting B - 130 mg/dL  Postprandial BG: less than 180 mg/dL  A1c: less than 7%    Type of encounter: [virtual]    Provided counseling on lifestyle modifications, medications, and self-monitoring. Patient has no additional questions at this time. Patient has scheduled endo visit in 1 month. Please reach out with any questions. Thank you.       Joanna Evans, PharmD    Provider on site: Jazz Mccall CNP  Continue all meds under the continuation of care with the referring provider and clinical pharmacy

## 2024-12-23 ENCOUNTER — APPOINTMENT (OUTPATIENT)
Dept: NEUROSURGERY | Facility: HOSPITAL | Age: 49
End: 2024-12-23
Payer: MEDICAID

## 2024-12-24 ENCOUNTER — APPOINTMENT (OUTPATIENT)
Dept: OPHTHALMOLOGY | Facility: CLINIC | Age: 49
End: 2024-12-24
Payer: MEDICAID

## 2024-12-27 ENCOUNTER — PATIENT OUTREACH (OUTPATIENT)
Dept: PRIMARY CARE | Facility: CLINIC | Age: 49
End: 2024-12-27
Payer: MEDICAID

## 2024-12-27 NOTE — PROGRESS NOTES
Unable to reach patient for call back after recent hospitalization.   [facility] with call back number for patient to call if needed   If no voicemail available call attempts x 2 were made to contact the patient to assist with any questions or concerns patient may have.    Patient continues to follow with [facility].     Next PCP visit is 1-7-25

## 2024-12-30 ENCOUNTER — APPOINTMENT (OUTPATIENT)
Dept: NEUROSURGERY | Facility: HOSPITAL | Age: 49
End: 2024-12-30
Payer: MEDICAID

## 2024-12-30 ENCOUNTER — APPOINTMENT (OUTPATIENT)
Dept: NEUROSURGERY | Facility: CLINIC | Age: 49
End: 2024-12-30
Payer: MEDICAID

## 2024-12-31 ENCOUNTER — CLINICAL SUPPORT (OUTPATIENT)
Dept: NEUROSURGERY | Facility: HOSPITAL | Age: 49
End: 2024-12-31
Payer: MEDICAID

## 2024-12-31 ENCOUNTER — APPOINTMENT (OUTPATIENT)
Dept: NEUROSURGERY | Facility: HOSPITAL | Age: 49
End: 2024-12-31
Payer: MEDICAID

## 2024-12-31 ENCOUNTER — TELEPHONE (OUTPATIENT)
Dept: ENDOCRINOLOGY | Facility: CLINIC | Age: 49
End: 2024-12-31

## 2024-12-31 NOTE — PROGRESS NOTES
Saw Pt. in clinic for wound check her Crani, shunt incision was well approximated with out redness, swelling or drainage. I removed sutures in a clean fashion. I discussed wound care and pt and family verbalized understanding. She will see Dr. Tafoya on 2/5/2025.

## 2025-01-04 ENCOUNTER — HOSPITAL ENCOUNTER (EMERGENCY)
Facility: HOSPITAL | Age: 50
Discharge: HOME | End: 2025-01-04
Attending: EMERGENCY MEDICINE
Payer: MEDICAID

## 2025-01-04 ENCOUNTER — APPOINTMENT (OUTPATIENT)
Dept: RADIOLOGY | Facility: HOSPITAL | Age: 50
End: 2025-01-04
Payer: MEDICAID

## 2025-01-04 VITALS
OXYGEN SATURATION: 97 % | HEIGHT: 62 IN | BODY MASS INDEX: 46.56 KG/M2 | TEMPERATURE: 97.3 F | SYSTOLIC BLOOD PRESSURE: 110 MMHG | DIASTOLIC BLOOD PRESSURE: 61 MMHG | RESPIRATION RATE: 15 BRPM | HEART RATE: 90 BPM | WEIGHT: 253 LBS

## 2025-01-04 DIAGNOSIS — R10.32 LEFT LOWER QUADRANT ABDOMINAL PAIN: Primary | ICD-10-CM

## 2025-01-04 LAB
ALBUMIN SERPL BCP-MCNC: 4 G/DL (ref 3.4–5)
ALP SERPL-CCNC: 103 U/L (ref 33–110)
ALT SERPL W P-5'-P-CCNC: 38 U/L (ref 7–45)
ANION GAP BLDV CALCULATED.4IONS-SCNC: 5 MMOL/L (ref 10–25)
ANION GAP SERPL CALC-SCNC: 14 MMOL/L (ref 10–20)
APPEARANCE UR: CLEAR
AST SERPL W P-5'-P-CCNC: 33 U/L (ref 9–39)
BASE EXCESS BLDV CALC-SCNC: 4.2 MMOL/L (ref -2–3)
BASOPHILS # BLD AUTO: 0.03 X10*3/UL (ref 0–0.1)
BASOPHILS NFR BLD AUTO: 0.6 %
BILIRUB DIRECT SERPL-MCNC: 0 MG/DL (ref 0–0.3)
BILIRUB SERPL-MCNC: 0.2 MG/DL (ref 0–1.2)
BILIRUB UR STRIP.AUTO-MCNC: NEGATIVE MG/DL
BODY TEMPERATURE: 37 DEGREES CELSIUS
BUN SERPL-MCNC: 21 MG/DL (ref 6–23)
CA-I BLDV-SCNC: 1.18 MMOL/L (ref 1.1–1.33)
CALCIUM SERPL-MCNC: 8.8 MG/DL (ref 8.6–10.3)
CHLORIDE BLDV-SCNC: 108 MMOL/L (ref 98–107)
CHLORIDE SERPL-SCNC: 102 MMOL/L (ref 98–107)
CO2 SERPL-SCNC: 28 MMOL/L (ref 21–32)
COLOR UR: COLORLESS
CREAT SERPL-MCNC: 0.99 MG/DL (ref 0.5–1.05)
EGFRCR SERPLBLD CKD-EPI 2021: 71 ML/MIN/1.73M*2
EOSINOPHIL # BLD AUTO: 0.05 X10*3/UL (ref 0–0.7)
EOSINOPHIL NFR BLD AUTO: 1.1 %
ERYTHROCYTE [DISTWIDTH] IN BLOOD BY AUTOMATED COUNT: 15.3 % (ref 11.5–14.5)
GLUCOSE BLDV-MCNC: 130 MG/DL (ref 74–99)
GLUCOSE SERPL-MCNC: 134 MG/DL (ref 74–99)
GLUCOSE UR STRIP.AUTO-MCNC: NORMAL MG/DL
HCG UR QL IA.RAPID: NEGATIVE
HCO3 BLDV-SCNC: 30.2 MMOL/L (ref 22–26)
HCT VFR BLD AUTO: 36.8 % (ref 36–46)
HCT VFR BLD EST: 34 % (ref 36–46)
HGB BLD-MCNC: 11.6 G/DL (ref 12–16)
HGB BLDV-MCNC: 11.2 G/DL (ref 12–16)
HOLD SPECIMEN: NORMAL
IMM GRANULOCYTES # BLD AUTO: 0.01 X10*3/UL (ref 0–0.7)
IMM GRANULOCYTES NFR BLD AUTO: 0.2 % (ref 0–0.9)
INHALED O2 CONCENTRATION: 21 %
KETONES UR STRIP.AUTO-MCNC: NEGATIVE MG/DL
LACTATE BLDV-SCNC: 1.2 MMOL/L (ref 0.4–2)
LEUKOCYTE ESTERASE UR QL STRIP.AUTO: NEGATIVE
LIPASE SERPL-CCNC: 76 U/L (ref 9–82)
LYMPHOCYTES # BLD AUTO: 1.86 X10*3/UL (ref 1.2–4.8)
LYMPHOCYTES NFR BLD AUTO: 39.3 %
MAGNESIUM SERPL-MCNC: 2.01 MG/DL (ref 1.6–2.4)
MCH RBC QN AUTO: 28 PG (ref 26–34)
MCHC RBC AUTO-ENTMCNC: 31.5 G/DL (ref 32–36)
MCV RBC AUTO: 89 FL (ref 80–100)
MONOCYTES # BLD AUTO: 0.31 X10*3/UL (ref 0.1–1)
MONOCYTES NFR BLD AUTO: 6.6 %
NEUTROPHILS # BLD AUTO: 2.47 X10*3/UL (ref 1.2–7.7)
NEUTROPHILS NFR BLD AUTO: 52.2 %
NITRITE UR QL STRIP.AUTO: NEGATIVE
NRBC BLD-RTO: 0 /100 WBCS (ref 0–0)
OXYHGB MFR BLDV: 69.1 % (ref 45–75)
PCO2 BLDV: 51 MM HG (ref 41–51)
PH BLDV: 7.38 PH (ref 7.33–7.43)
PH UR STRIP.AUTO: 7.5 [PH]
PLATELET # BLD AUTO: 281 X10*3/UL (ref 150–450)
PO2 BLDV: 46 MM HG (ref 35–45)
POTASSIUM BLDV-SCNC: 4.1 MMOL/L (ref 3.5–5.3)
POTASSIUM SERPL-SCNC: 3.9 MMOL/L (ref 3.5–5.3)
PROT SERPL-MCNC: 7.2 G/DL (ref 6.4–8.2)
PROT UR STRIP.AUTO-MCNC: NEGATIVE MG/DL
RBC # BLD AUTO: 4.15 X10*6/UL (ref 4–5.2)
RBC # UR STRIP.AUTO: NEGATIVE /UL
SAO2 % BLDV: 70 % (ref 45–75)
SODIUM BLDV-SCNC: 139 MMOL/L (ref 136–145)
SODIUM SERPL-SCNC: 140 MMOL/L (ref 136–145)
SP GR UR STRIP.AUTO: 1.01
UROBILINOGEN UR STRIP.AUTO-MCNC: NORMAL MG/DL
WBC # BLD AUTO: 4.7 X10*3/UL (ref 4.4–11.3)

## 2025-01-04 PROCEDURE — 84132 ASSAY OF SERUM POTASSIUM: CPT | Mod: 59 | Performed by: EMERGENCY MEDICINE

## 2025-01-04 PROCEDURE — 2550000001 HC RX 255 CONTRASTS: Performed by: EMERGENCY MEDICINE

## 2025-01-04 PROCEDURE — 96376 TX/PRO/DX INJ SAME DRUG ADON: CPT

## 2025-01-04 PROCEDURE — 96361 HYDRATE IV INFUSION ADD-ON: CPT

## 2025-01-04 PROCEDURE — 74177 CT ABD & PELVIS W/CONTRAST: CPT

## 2025-01-04 PROCEDURE — 70450 CT HEAD/BRAIN W/O DYE: CPT

## 2025-01-04 PROCEDURE — 81025 URINE PREGNANCY TEST: CPT | Performed by: EMERGENCY MEDICINE

## 2025-01-04 PROCEDURE — 96375 TX/PRO/DX INJ NEW DRUG ADDON: CPT

## 2025-01-04 PROCEDURE — 99285 EMERGENCY DEPT VISIT HI MDM: CPT | Mod: 25 | Performed by: EMERGENCY MEDICINE

## 2025-01-04 PROCEDURE — 71045 X-RAY EXAM CHEST 1 VIEW: CPT | Mod: FOREIGN READ | Performed by: RADIOLOGY

## 2025-01-04 PROCEDURE — 70450 CT HEAD/BRAIN W/O DYE: CPT | Performed by: RADIOLOGY

## 2025-01-04 PROCEDURE — 70250 X-RAY EXAM OF SKULL: CPT

## 2025-01-04 PROCEDURE — 82374 ASSAY BLOOD CARBON DIOXIDE: CPT | Performed by: EMERGENCY MEDICINE

## 2025-01-04 PROCEDURE — 74177 CT ABD & PELVIS W/CONTRAST: CPT | Mod: FOREIGN READ | Performed by: RADIOLOGY

## 2025-01-04 PROCEDURE — 96374 THER/PROPH/DIAG INJ IV PUSH: CPT | Mod: 59

## 2025-01-04 PROCEDURE — 85025 COMPLETE CBC W/AUTO DIFF WBC: CPT | Performed by: EMERGENCY MEDICINE

## 2025-01-04 PROCEDURE — 2500000004 HC RX 250 GENERAL PHARMACY W/ HCPCS (ALT 636 FOR OP/ED): Performed by: EMERGENCY MEDICINE

## 2025-01-04 PROCEDURE — 83735 ASSAY OF MAGNESIUM: CPT | Performed by: EMERGENCY MEDICINE

## 2025-01-04 PROCEDURE — 84155 ASSAY OF PROTEIN SERUM: CPT | Performed by: EMERGENCY MEDICINE

## 2025-01-04 PROCEDURE — 81003 URINALYSIS AUTO W/O SCOPE: CPT | Performed by: EMERGENCY MEDICINE

## 2025-01-04 PROCEDURE — 70250 X-RAY EXAM OF SKULL: CPT | Mod: FOREIGN READ | Performed by: RADIOLOGY

## 2025-01-04 PROCEDURE — 83690 ASSAY OF LIPASE: CPT | Performed by: EMERGENCY MEDICINE

## 2025-01-04 PROCEDURE — 74018 RADEX ABDOMEN 1 VIEW: CPT | Mod: FOREIGN READ | Performed by: RADIOLOGY

## 2025-01-04 RX ORDER — MORPHINE SULFATE 4 MG/ML
4 INJECTION INTRAVENOUS ONCE
Status: COMPLETED | OUTPATIENT
Start: 2025-01-04 | End: 2025-01-04

## 2025-01-04 RX ORDER — ONDANSETRON HYDROCHLORIDE 2 MG/ML
4 INJECTION, SOLUTION INTRAVENOUS ONCE
Status: COMPLETED | OUTPATIENT
Start: 2025-01-04 | End: 2025-01-04

## 2025-01-04 RX ORDER — HEPARIN 100 UNIT/ML
5 SYRINGE INTRAVENOUS ONCE
Status: COMPLETED | OUTPATIENT
Start: 2025-01-04 | End: 2025-01-04

## 2025-01-04 RX ORDER — DICYCLOMINE HYDROCHLORIDE 20 MG/1
20 TABLET ORAL 4 TIMES DAILY PRN
Qty: 20 TABLET | Refills: 0 | Status: SHIPPED | OUTPATIENT
Start: 2025-01-04

## 2025-01-04 RX ADMIN — IOHEXOL 75 ML: 350 INJECTION, SOLUTION INTRAVENOUS at 03:14

## 2025-01-04 RX ADMIN — MORPHINE SULFATE 4 MG: 4 INJECTION INTRAVENOUS at 02:24

## 2025-01-04 RX ADMIN — ONDANSETRON 4 MG: 2 INJECTION INTRAMUSCULAR; INTRAVENOUS at 02:24

## 2025-01-04 RX ADMIN — MORPHINE SULFATE 4 MG: 4 INJECTION, SOLUTION INTRAMUSCULAR; INTRAVENOUS at 06:18

## 2025-01-04 RX ADMIN — SODIUM CHLORIDE 1000 ML: 9 INJECTION, SOLUTION INTRAVENOUS at 02:24

## 2025-01-04 RX ADMIN — Medication 500 UNITS: at 07:21

## 2025-01-04 ASSESSMENT — LIFESTYLE VARIABLES
EVER FELT BAD OR GUILTY ABOUT YOUR DRINKING: NO
TOTAL SCORE: 0
HAVE YOU EVER FELT YOU SHOULD CUT DOWN ON YOUR DRINKING: NO
HAVE PEOPLE ANNOYED YOU BY CRITICIZING YOUR DRINKING: NO
EVER HAD A DRINK FIRST THING IN THE MORNING TO STEADY YOUR NERVES TO GET RID OF A HANGOVER: NO

## 2025-01-04 ASSESSMENT — PAIN - FUNCTIONAL ASSESSMENT: PAIN_FUNCTIONAL_ASSESSMENT: 0-10

## 2025-01-04 ASSESSMENT — PAIN SCALES - GENERAL
PAINLEVEL_OUTOF10: 5 - MODERATE PAIN
PAINLEVEL_OUTOF10: 8
PAINLEVEL_OUTOF10: 6

## 2025-01-04 ASSESSMENT — PAIN DESCRIPTION - LOCATION: LOCATION: ABDOMEN

## 2025-01-04 ASSESSMENT — PAIN DESCRIPTION - PROGRESSION: CLINICAL_PROGRESSION: GRADUALLY IMPROVING

## 2025-01-04 ASSESSMENT — PAIN DESCRIPTION - ORIENTATION: ORIENTATION: LEFT;LOWER

## 2025-01-04 ASSESSMENT — PAIN DESCRIPTION - PAIN TYPE: TYPE: ACUTE PAIN

## 2025-01-04 ASSESSMENT — PAIN DESCRIPTION - DESCRIPTORS: DESCRIPTORS: STABBING

## 2025-01-04 NOTE — ED NOTES
Pt given discharge paperwork. No questions at this time. Ambulated to private vehicle independently. Right chest port removed.      Martha Ribeiro RN  01/04/25 [id number]

## 2025-01-04 NOTE — ED PROVIDER NOTES
HPI   Chief Complaint   Patient presents with    Abdominal Pain     LLQ, vomiting. Started a couple days ago but getting worse. Has  shunt and states it feels like something similar that happened in the past that was related to the shunt       HPI:  49-year-old female with recent  shunt revision presents emergency department complaining of abdominal pain along with nausea and vomiting.  She says she has sharp left sided intermittent abdominal pain which now has become more constant and she has had nausea and several episodes of vomiting.  She denies any headache denies any chest discomfort no shortness of breath denies any obvious fevers or chills she did recently undergo antibiotic washout with neurosurgery at [facility] for her  shunt in December    Limitations to history: None  Independent Historians: None  External Records Reviewed: EMR records  ------------------------------------------------------------------------------------------------------------------------------------------  ROS: a ten point review of systems was performed and negative except as per HPI.  ------------------------------------------------------------------------------------------------------------------------------------------  PMH / PSH: as per HPI, reviewed in EMR and discussed with the patient []  MEDS:  reviewed in EMR and discussed with the patient []  ALLERGIES: reviewed in EMR[]  SocH:  as per HPI, otherwise reviewed in EMR []  FH:  as per HPI, otherwise reviewed in EMR []  ------------------------------------------------------------------------------------------------------------------------------------------  Physical Exam:  VS: As documented in the triage note and EMR flowsheet from this visit was reviewed  General: Well appearing. No acute distress.   Eyes:  Extraocular movements grossly intact. No scleral icterus.   HEENT: Healing  shunt surgical incision site at the right temporal area which is clean dry and intact  atraumatic. Normocephalic.    Neck: Supple. No gross masses  CV: RRR, audible S1/S2, 2+ symmetric peripheral pulses  Resp: Clear to auscultation bilaterally. No respiratory distress.  Non-labored respirations  GI: Soft, reducible tenderness to palpation in the left lower quadrant well-healing laparoscopic surgical abdominal scars, non-distended, no rebound or gaurding  MSK: Symmetric muscle bulk. No gross step offs or deformities.  Skin: Warm, dry, no obvious rash.  Neuro: Speech fluent. Awake. Alert. Appropriate conversation.  Psych: Appropriate mood and affect for situation  ------------------------------------------------------------------------------------------------------------------------------------------  Hospital Course / Medical Decision Making:  Patient well-appearing on my assessment abdomen without any point tenderness and was soft.  Patient was hooked up to the cardiac monitor peripheral IV access obtained labs are drawn blood work was reassuring white blood cell count was normal electrolytes were normal.  She underwent a CT of the head as well as CT of the abdomen and pelvis along with a shunt series x-ray which shows that the catheter seems to be intact ventricles were stable in size CT head was unremarkable CT abdomen and pelvis was negative for any acute intra-abdominal process appears that her  shunt is functioning appropriately.  No obvious cause as to the patient's cramping left lower quadrant abdominal pain.  She was treated with IV morphine which she tolerated well and was feeling better after ED medication she felt comfortable and well enough for discharge home she has antiemetics at home but prescription for Bentyl sent to her pharmacy of preference she was discharged in the care of her  in stable condition    Final diagnosis and disposition: Abdominal pain            Amanda Justin MD                          Patient History   Past Medical History:   Diagnosis Date    Abnormal  findings on diagnostic imaging of other abdominal regions, including retroperitoneum 10/14/2020    Abnormal CT of the abdomen    Acquired deformity of nose 03/24/2022    Nasal deformity    Acute upper respiratory infection, unspecified 10/16/2019    Acute URI    Adrenal disease (Multi)     Allergic     Allergy status to unspecified drugs, medicaments and biological substances 05/22/2020    History of drug allergy    Allergy status to unspecified drugs, medicaments and biological substances 11/13/2020    History of adverse drug reaction    Anemia     Anxiety 2005    Asthma     Benign intracranial hypertension 01/27/2022    Pseudotumor cerebri    Bipolar disorder, unspecified (Multi)     Bipolar disease, chronic    Breast calcification, right 08/21/2018    Cellulitis of abdominal wall 09/28/2022    Cellulitis of right abdominal wall    Cervicalgia 07/01/2020    Cervicalgia of [id number] region    Chronic maxillary sinusitis 01/04/2022    Chronic maxillary sinusitis    Chronic sialoadenitis 03/16/2020    Chronic sialoadenitis    COVID-19 01/06/2022    COVID-19 with multiple comorbidities    Decreased white blood cell count, unspecified 11/04/2019    Leukopenia    Disease of thyroid gland     Disturbances of salivary secretion 03/16/2020    Xerostomia    Dry eye syndrome of bilateral lacrimal glands 10/07/2022    Dry eyes, bilateral    Encounter for preprocedural cardiovascular examination 02/01/2022    Preoperative cardiovascular examination    Food additives allergy status 06/11/2020    Allergy to food dye    Fracture of nasal bones, initial encounter for closed fracture 03/03/2022    Closed fracture of nasal bone, initial encounter    GERD (gastroesophageal reflux disease) 15 years old    Granuloma of right orbit 10/07/2021    Inflammatory pseudotumor of right orbit    History of endometrial ablation 11/09/2017    Hyperlipidemia     Hypertension     Hyperthyroidism     Hypoglycemia     Hypothyroidism      Immunocompromised     Localized swelling, mass and lump, head 03/24/2022    Swollen nose    Major depressive disorder, recurrent, in full remission ([facility]) 10/07/2021    Depression, major, recurrent, in complete remission    Mammary duct ectasia of left breast 08/24/2022    Periductal mastitis of left breast    Meningitis ([facility]) 2008    Migraine     Nipple discharge 08/24/2022    Bloody discharge from left nipple    Ocular pain, right eye 10/07/2022    Pain in right eye    Optic atrophy     Other abnormal and inconclusive findings on diagnostic imaging of breast 07/06/2020    Other abnormal and inconclusive findings on diagnostic imaging of breast    Other anomalies of pupillary function 05/31/2019    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    Other chest pain 05/18/2020    Chest discomfort    Other conditions influencing health status 08/01/2022    History of cough    Other conditions influencing health status 08/03/2021    Chronic migraine    Other specified disorders of eustachian tube, left ear 11/18/2019    ETD (Eustachian tube dysfunction), left    Other specified disorders of nose and nasal sinuses 03/24/2022    Nasal dryness    Other specified disorders of nose and nasal sinuses 03/24/2022    Nasal crusting    Pelvic and perineal pain 07/06/2020    Pelvic pain    Personal history of other diseases of the circulatory system 04/07/2020    History of sinus tachycardia    Personal history of other diseases of the circulatory system 04/07/2020    History of abnormal electrocardiography    Personal history of other diseases of the circulatory system     History of Raynaud's syndrome    Personal history of other diseases of the digestive system     History of irritable bowel syndrome    Personal history of other diseases of the digestive system 03/02/2020    History of oral pain    Personal history of other diseases of the musculoskeletal system and connective tissue 01/19/2022    History of neck pain     Personal history of other diseases of the musculoskeletal system and connective tissue 03/02/2021    History of scleroderma    Personal history of other diseases of the musculoskeletal system and connective tissue 06/16/2020    History of muscle weakness    Personal history of other diseases of the nervous system and sense organs 11/18/2019    History of hearing loss    Personal history of other diseases of the nervous system and sense organs 09/21/2022    History of partial seizures    Personal history of other diseases of the respiratory system 04/14/2021    History of asthma    Personal history of other endocrine, nutritional and metabolic disease 02/17/2021    History of diabetes mellitus    Personal history of other mental and behavioral disorders 05/27/2021    History of anxiety    Personal history of other specified conditions 09/07/2022    History of nipple discharge    Personal history of other specified conditions 10/16/2019    History of headache    Personal history of other specified conditions 09/28/2022    History of lump of left breast    Personal history of other specified conditions 09/16/2021    History of persistent cough    Personal history of other specified conditions 02/01/2022    History of palpitations    Personal history of other specified conditions 03/09/2022    History of headache    Personal history of other specified conditions 02/12/2014    History of chest pain    Personal history of other specified conditions 10/27/2021    History of nausea and vomiting    Personal history of other specified conditions 10/16/2019    History of fatigue    Personal history of other specified conditions 02/26/2021    History of orthopnea    Personal history of other specified conditions 02/22/2021    History of shortness of breath    Personal history of urinary calculi     H/O renal calculi    Polycystic ovary syndrome     Postural orthostatic tachycardia syndrome (POTS)     POTS (postural  orthostatic tachycardia syndrome)    Rash and other nonspecific skin eruption 03/15/2022    Rash    Repeated falls 06/23/2021    Recurrent falls    Right lower quadrant pain 10/14/2020    Abdominal pain, RLQ (right lower quadrant)    Seizures (Multi)     Sjogren syndrome, unspecified (Multi)     History of Sjogren's disease    Sjogren's syndrome     Sleep apnea     Slow transit constipation 07/09/2020    Slow transit constipation    Subarachnoid hemorrhage, traumatic (Multi) 04/19/2023    Thyroid nodule     Traumatic subarachnoid hemorrhage with loss of consciousness of unspecified duration, subsequent encounter 03/15/2022    Subarachnoid hemorrhage following injury, with loss of consciousness, subsequent encounter    Traumatic subarachnoid hemorrhage without loss of consciousness, subsequent encounter     Subarachnoid hemorrhage following injury, no loss of consciousness, subsequent encounter    Type 2 diabetes mellitus     Unspecified disorder of refraction 10/07/2022    Refractive error    Unspecified optic neuritis 11/06/2020    Right optic neuritis    Unspecified optic neuritis 11/06/2020    Optic neuritis, right    Unspecified visual loss 09/25/2019    Vision loss    Varicella As a child    Venous insufficiency (chronic) (peripheral) 10/18/2021    Chronic venous insufficiency of lower extremity    Viral infection, unspecified 01/11/2022    Nonspecific syndrome suggestive of viral illness    Vitamin D deficiency     Vitamin D deficiency, unspecified 09/28/2022    Vitamin D deficiency     Past Surgical History:   Procedure Laterality Date    APPENDECTOMY  2017    BRAIN SURGERY  Warren placement to measure pressure    CARDIAC CATHETERIZATION      CHOLECYSTECTOMY      ENDOMETRIAL ABLATION      FRACTURE SURGERY  12/2023    HERNIA REPAIR Right 03/01/2024    with mesh    HYSTERECTOMY  2017    MR HEAD ANGIO WO IV CONTRAST  02/08/2021    MR HEAD ANGIO WO IV CONTRAST 2/8/2021 [facility]    MR NECK ANGIO WO IV  CONTRAST  02/08/2021    MR NECK ANGIO WO IV CONTRAST 2/8/2021 [facility] CLINICAL LEGACY    OTHER SURGICAL HISTORY  08/22/2019    Carpal tunnel surgery    OTHER SURGICAL HISTORY  08/22/2019    Hysterectomy    OTHER SURGICAL HISTORY  08/22/2019    Venous access port placement    OTHER SURGICAL HISTORY  08/22/2019    Cholecystectomy    OTHER SURGICAL HISTORY  08/22/2019    Appendectomy    OTHER SURGICAL HISTORY  08/22/2019    Pyloroplasty    WISDOM TOOTH EXTRACTION  2004     Family History   Problem Relation Name Age of Onset    Other (Perforated bowel) Mother Radha     Hypertension Mother Radha     Macular degeneration Mother Radha     Depression Mother Radha     Hyperlipidemia Mother Radha     Mental illness Mother Radha     Hyperlipidemia Father Mac     Hypertension Father Mac     Heart attack Father Mac     Other (S/P CABG) Father Mac     Diabetes Father Mac     Prostate cancer Father Mac     Coronary artery disease Father Mac     Heart failure Father Mac     Stroke Father Mac     Cancer Father Mac     Macular degeneration Father Mac     Retinal detachment Father Mac     Asthma Father Mac     Hearing loss Father Mac     Heart disease Father Mac     Hernia Father Mac     Stroke Sister Jazmin     Breast cancer Sister Jazmin     Lupus Sister Jazmin     Ovarian cancer Sister Jazmin     Astigmatism Sister Jazmin     Arthritis Sister Jazmin     Asthma Sister Jazmin     Depression Sister Jazmin     Mental illness Sister Jazmin     Miscarriages / Stillbirths Sister Jazmin     Vision loss Sister Jazmin     Hyperlipidemia Brother Sanchez     Hypertension Brother Sanchez     Astigmatism Brother Sanchez     Arthritis Brother Sanchez     Asthma Brother Sanchez     Diabetes Father's Sister Linda     Blindness Father's Sister Linda     Vision loss Father's Sister Linda     Thyroid cancer Father's Sister Bekah     Thyroid disease Father's Sister Jessica     Colon cancer Father's Brother ?     Cancer Father's Brother Kian      Anesthesia related problems Father's Brother Kian     Depression Father's Brother Kian     Hernia Father's Brother Kian     Mental illness Father's Brother Kian     Cancer Maternal Grandmother Brenda     Ovarian cancer Maternal Grandmother Brenda     Blindness Other Multiple     Melanoma Other      Asthma Other      Other (hay fever) Other      Allergies Other      Alcohol abuse Paternal Grandfather Elkin     Arthritis Sister Isha     Asthma Sister Isha     Cancer Sister Isha     Depression Sister Isha     Mental illness Sister Isha     Miscarriages / Stillbirths Sister Isha     Ovarian cancer Sister Isha     Glaucoma Neg Hx       Social History     Tobacco Use    Smoking status: Never    Smokeless tobacco: Never   Vaping Use    Vaping status: Never Used   Substance Use Topics    Alcohol use: Not Currently     Comment: Socially in College    Drug use: Yes     Types: Benzodiazepines       Physical Exam   ED Triage Vitals [01/04/25 0059]   Temperature Heart Rate Respirations BP   36.3 °C (97.3 °F) 98 18 138/80      Pulse Ox Temp Source Heart Rate Source Patient Position   97 % Tympanic -- --      BP Location FiO2 (%)     -- --       Physical Exam      ED Course & MDM   Diagnoses as of 01/04/25 0634   Left lower quadrant abdominal pain                 No data recorded     Jay Coma Scale Score: 15 (01/04/25 0059 : Reyna Leon, LESLIE)                           Medical Decision Making      Procedure  Procedures     Amanda Justin MD  01/04/25 0650

## 2025-01-07 ENCOUNTER — SPECIALTY PHARMACY (OUTPATIENT)
Dept: PHARMACY | Facility: CLINIC | Age: 50
End: 2025-01-07

## 2025-01-07 ENCOUNTER — APPOINTMENT (OUTPATIENT)
Dept: PRIMARY CARE | Facility: CLINIC | Age: 50
End: 2025-01-07
Payer: MEDICAID

## 2025-01-07 PROCEDURE — RXMED WILLOW AMBULATORY MEDICATION CHARGE

## 2025-01-08 ENCOUNTER — APPOINTMENT (OUTPATIENT)
Dept: RADIOLOGY | Facility: HOSPITAL | Age: 50
End: 2025-01-08
Payer: MEDICAID

## 2025-01-08 ENCOUNTER — HOSPITAL ENCOUNTER (OUTPATIENT)
Facility: HOSPITAL | Age: 50
Setting detail: OBSERVATION
Discharge: HOME | End: 2025-01-11
Attending: EMERGENCY MEDICINE | Admitting: NEUROLOGICAL SURGERY
Payer: MEDICAID

## 2025-01-08 DIAGNOSIS — Z98.2 VP (VENTRICULOPERITONEAL) SHUNT STATUS: ICD-10-CM

## 2025-01-08 DIAGNOSIS — R56.9 SEIZURE (MULTI): Primary | ICD-10-CM

## 2025-01-08 DIAGNOSIS — R51.9 NONINTRACTABLE HEADACHE, UNSPECIFIED CHRONICITY PATTERN, UNSPECIFIED HEADACHE TYPE: ICD-10-CM

## 2025-01-08 DIAGNOSIS — G93.2 IDIOPATHIC INTRACRANIAL HYPERTENSION: ICD-10-CM

## 2025-01-08 LAB
ABO GROUP (TYPE) IN BLOOD: NORMAL
ALBUMIN SERPL BCP-MCNC: 4.1 G/DL (ref 3.4–5)
ALP SERPL-CCNC: 103 U/L (ref 33–110)
ALT SERPL W P-5'-P-CCNC: 37 U/L (ref 7–45)
ANION GAP SERPL CALC-SCNC: 11 MMOL/L (ref 10–20)
ANTIBODY SCREEN: NORMAL
APPEARANCE CSF: ABNORMAL
APTT PPP: 34 SECONDS (ref 27–38)
AST SERPL W P-5'-P-CCNC: 15 U/L (ref 9–39)
B-HCG SERPL-ACNC: 3 MIU/ML
BASOPHILS # BLD AUTO: 0.03 X10*3/UL (ref 0–0.1)
BASOPHILS NFR BLD AUTO: 0.7 %
BASOPHILS NFR CSF MANUAL: 0 %
BILIRUB SERPL-MCNC: 0.2 MG/DL (ref 0–1.2)
BLASTS CSF MANUAL: 0 %
BUN SERPL-MCNC: 18 MG/DL (ref 6–23)
CALCIUM SERPL-MCNC: 9.5 MG/DL (ref 8.6–10.6)
CHLORIDE SERPL-SCNC: 102 MMOL/L (ref 98–107)
CO2 SERPL-SCNC: 29 MMOL/L (ref 21–32)
COLOR CSF: ABNORMAL
CREAT SERPL-MCNC: 0.88 MG/DL (ref 0.5–1.05)
CRP SERPL-MCNC: 0.54 MG/DL
EGFRCR SERPLBLD CKD-EPI 2021: 82 ML/MIN/1.73M*2
EOSINOPHIL # BLD AUTO: 0.02 X10*3/UL (ref 0–0.7)
EOSINOPHIL NFR BLD AUTO: 0.4 %
EOSINOPHIL NFR CSF MANUAL: 43 %
ERYTHROCYTE [DISTWIDTH] IN BLOOD BY AUTOMATED COUNT: 15.2 % (ref 11.5–14.5)
ERYTHROCYTE [SEDIMENTATION RATE] IN BLOOD BY WESTERGREN METHOD: 52 MM/H (ref 0–20)
GLUCOSE CSF-MCNC: 79 MG/DL (ref 40–70)
GLUCOSE SERPL-MCNC: 177 MG/DL (ref 74–99)
HCT VFR BLD AUTO: 35.8 % (ref 36–46)
HGB BLD-MCNC: 11.5 G/DL (ref 12–16)
HOLD SPECIMEN: NORMAL
IMM GRANULOCYTES # BLD AUTO: 0.01 X10*3/UL (ref 0–0.7)
IMM GRANULOCYTES NFR BLD AUTO: 0.2 % (ref 0–0.9)
IMM GRANULOCYTES NFR CSF: 0 %
INR PPP: 1 (ref 0.9–1.1)
LEVETIRACETAM SERPL-MCNC: 42 UG/ML (ref 10–40)
LYMPHOCYTES # BLD AUTO: 1.59 X10*3/UL (ref 1.2–4.8)
LYMPHOCYTES NFR BLD AUTO: 35.6 %
LYMPHOCYTES NFR CSF MANUAL: 14 % (ref 28–96)
MCH RBC QN AUTO: 26.9 PG (ref 26–34)
MCHC RBC AUTO-ENTMCNC: 32.1 G/DL (ref 32–36)
MCV RBC AUTO: 84 FL (ref 80–100)
MONOCYTES # BLD AUTO: 0.31 X10*3/UL (ref 0.1–1)
MONOCYTES NFR BLD AUTO: 6.9 %
MONOS+MACROS NFR CSF MANUAL: 43 % (ref 16–56)
NEUTROPHILS # BLD AUTO: 2.51 X10*3/UL (ref 1.2–7.7)
NEUTROPHILS NFR BLD AUTO: 56.2 %
NEUTS SEG NFR CSF MANUAL: 0 % (ref 0–5)
NRBC BLD-RTO: 0 /100 WBCS (ref 0–0)
OTHER CELLS NFR CSF MANUAL: 0 %
PLASMA CELLS NFR CSF MICRO: 0 %
PLATELET # BLD AUTO: 279 X10*3/UL (ref 150–450)
POTASSIUM SERPL-SCNC: 3.9 MMOL/L (ref 3.5–5.3)
PROT CSF-MCNC: 114 MG/DL (ref 15–45)
PROT SERPL-MCNC: 8.1 G/DL (ref 6.4–8.2)
PROTHROMBIN TIME: 10.9 SECONDS (ref 9.8–12.8)
RBC # BLD AUTO: 4.27 X10*6/UL (ref 4–5.2)
RBC # CSF AUTO: 3000 /UL (ref 0–5)
RH FACTOR (ANTIGEN D): NORMAL
SODIUM SERPL-SCNC: 138 MMOL/L (ref 136–145)
TOTAL CELLS COUNTED CSF: 14
TUBE # CSF: ABNORMAL
WBC # BLD AUTO: 4.5 X10*3/UL (ref 4.4–11.3)
WBC # CSF AUTO: 6 /UL (ref 1–5)

## 2025-01-08 PROCEDURE — G0378 HOSPITAL OBSERVATION PER HR: HCPCS

## 2025-01-08 PROCEDURE — 87070 CULTURE OTHR SPECIMN AEROBIC: CPT

## 2025-01-08 PROCEDURE — 71045 X-RAY EXAM CHEST 1 VIEW: CPT | Performed by: STUDENT IN AN ORGANIZED HEALTH CARE EDUCATION/TRAINING PROGRAM

## 2025-01-08 PROCEDURE — 84157 ASSAY OF PROTEIN OTHER: CPT

## 2025-01-08 PROCEDURE — 80165 DIPROPYLACETIC ACID FREE: CPT | Performed by: PHYSICIAN ASSISTANT

## 2025-01-08 PROCEDURE — 84702 CHORIONIC GONADOTROPIN TEST: CPT

## 2025-01-08 PROCEDURE — 70450 CT HEAD/BRAIN W/O DYE: CPT | Performed by: RADIOLOGY

## 2025-01-08 PROCEDURE — 86140 C-REACTIVE PROTEIN: CPT

## 2025-01-08 PROCEDURE — 82945 GLUCOSE OTHER FLUID: CPT

## 2025-01-08 PROCEDURE — 62270 DX LMBR SPI PNXR: CPT

## 2025-01-08 PROCEDURE — 96375 TX/PRO/DX INJ NEW DRUG ADDON: CPT

## 2025-01-08 PROCEDURE — 36415 COLL VENOUS BLD VENIPUNCTURE: CPT | Performed by: PHYSICIAN ASSISTANT

## 2025-01-08 PROCEDURE — 2500000004 HC RX 250 GENERAL PHARMACY W/ HCPCS (ALT 636 FOR OP/ED): Mod: SE

## 2025-01-08 PROCEDURE — 85652 RBC SED RATE AUTOMATED: CPT

## 2025-01-08 PROCEDURE — 89051 BODY FLUID CELL COUNT: CPT

## 2025-01-08 PROCEDURE — 80177 DRUG SCRN QUAN LEVETIRACETAM: CPT | Performed by: EMERGENCY MEDICINE

## 2025-01-08 PROCEDURE — 86901 BLOOD TYPING SEROLOGIC RH(D): CPT

## 2025-01-08 PROCEDURE — 80235 DRUG ASSAY LACOSAMIDE: CPT | Performed by: EMERGENCY MEDICINE

## 2025-01-08 PROCEDURE — 80165 DIPROPYLACETIC ACID FREE: CPT | Performed by: EMERGENCY MEDICINE

## 2025-01-08 PROCEDURE — 70450 CT HEAD/BRAIN W/O DYE: CPT

## 2025-01-08 PROCEDURE — 99285 EMERGENCY DEPT VISIT HI MDM: CPT | Mod: 25 | Performed by: EMERGENCY MEDICINE

## 2025-01-08 PROCEDURE — 99254 IP/OBS CNSLTJ NEW/EST MOD 60: CPT | Performed by: NEUROLOGICAL SURGERY

## 2025-01-08 PROCEDURE — 74018 RADEX ABDOMEN 1 VIEW: CPT | Performed by: STUDENT IN AN ORGANIZED HEALTH CARE EDUCATION/TRAINING PROGRAM

## 2025-01-08 PROCEDURE — 70250 X-RAY EXAM OF SKULL: CPT | Performed by: STUDENT IN AN ORGANIZED HEALTH CARE EDUCATION/TRAINING PROGRAM

## 2025-01-08 PROCEDURE — 2500000001 HC RX 250 WO HCPCS SELF ADMINISTERED DRUGS (ALT 637 FOR MEDICARE OP): Mod: SE

## 2025-01-08 PROCEDURE — 62270 DX LMBR SPI PNXR: CPT | Mod: GC

## 2025-01-08 PROCEDURE — 80053 COMPREHEN METABOLIC PANEL: CPT | Performed by: EMERGENCY MEDICINE

## 2025-01-08 PROCEDURE — 74177 CT ABD & PELVIS W/CONTRAST: CPT | Performed by: RADIOLOGY

## 2025-01-08 PROCEDURE — 70250 X-RAY EXAM OF SKULL: CPT

## 2025-01-08 PROCEDURE — 85610 PROTHROMBIN TIME: CPT

## 2025-01-08 PROCEDURE — 99252 IP/OBS CONSLTJ NEW/EST SF 35: CPT

## 2025-01-08 PROCEDURE — 99285 EMERGENCY DEPT VISIT HI MDM: CPT

## 2025-01-08 PROCEDURE — 96374 THER/PROPH/DIAG INJ IV PUSH: CPT

## 2025-01-08 PROCEDURE — 71045 X-RAY EXAM CHEST 1 VIEW: CPT

## 2025-01-08 PROCEDURE — 2550000001 HC RX 255 CONTRASTS: Mod: SE | Performed by: EMERGENCY MEDICINE

## 2025-01-08 PROCEDURE — 85025 COMPLETE CBC W/AUTO DIFF WBC: CPT | Performed by: EMERGENCY MEDICINE

## 2025-01-08 PROCEDURE — 80235 DRUG ASSAY LACOSAMIDE: CPT | Performed by: PHYSICIAN ASSISTANT

## 2025-01-08 PROCEDURE — 70250 X-RAY EXAM OF SKULL: CPT | Performed by: RADIOLOGY

## 2025-01-08 PROCEDURE — 74177 CT ABD & PELVIS W/CONTRAST: CPT

## 2025-01-08 RX ORDER — LACOSAMIDE 100 MG/1
250 TABLET ORAL ONCE
Status: COMPLETED | OUTPATIENT
Start: 2025-01-08 | End: 2025-01-08

## 2025-01-08 RX ORDER — ONDANSETRON 4 MG/1
4 TABLET, FILM COATED ORAL ONCE
Status: COMPLETED | OUTPATIENT
Start: 2025-01-08 | End: 2025-01-08

## 2025-01-08 RX ORDER — MIDAZOLAM HYDROCHLORIDE 5 MG/ML
5 INJECTION, SOLUTION INTRAMUSCULAR; INTRAVENOUS ONCE
Status: COMPLETED | OUTPATIENT
Start: 2025-01-08 | End: 2025-01-08

## 2025-01-08 RX ORDER — LEVETIRACETAM 500 MG/1
1500 TABLET ORAL ONCE
Status: COMPLETED | OUTPATIENT
Start: 2025-01-08 | End: 2025-01-08

## 2025-01-08 RX ADMIN — LACOSAMIDE 250 MG: 100 TABLET, FILM COATED ORAL at 21:02

## 2025-01-08 RX ADMIN — LEVETIRACETAM 1500 MG: 500 TABLET, FILM COATED ORAL at 21:02

## 2025-01-08 RX ADMIN — IOHEXOL 90 ML: 350 INJECTION, SOLUTION INTRAVENOUS at 20:15

## 2025-01-08 RX ADMIN — MIDAZOLAM HYDROCHLORIDE 2 MG: 5 INJECTION, SOLUTION INTRAMUSCULAR; INTRAVENOUS at 22:37

## 2025-01-08 RX ADMIN — ONDANSETRON HYDROCHLORIDE 4 MG: 4 TABLET, FILM COATED ORAL at 17:22

## 2025-01-08 ASSESSMENT — LIFESTYLE VARIABLES
HAVE YOU EVER FELT YOU SHOULD CUT DOWN ON YOUR DRINKING: NO
TOTAL SCORE: 0
EVER HAD A DRINK FIRST THING IN THE MORNING TO STEADY YOUR NERVES TO GET RID OF A HANGOVER: NO
EVER FELT BAD OR GUILTY ABOUT YOUR DRINKING: NO
HAVE PEOPLE ANNOYED YOU BY CRITICIZING YOUR DRINKING: NO

## 2025-01-08 ASSESSMENT — PAIN - FUNCTIONAL ASSESSMENT: PAIN_FUNCTIONAL_ASSESSMENT: 0-10

## 2025-01-08 NOTE — ED PROVIDER NOTES
HPI   Chief Complaint   Patient presents with    Headache           Seizures       Patient is a 49-year-old female with an extensive past medical history consisting of left anterior optic neuropathy, IIH s/p  shunt, HTN, DM2, hypothyroidism, seizure disorder, POTS, CVID receiving monthly IVIG, Sjogren's syndrome, scleroderma, BRENT on CPAP, and migraines presenting to the ED with headache and seizures.  Patient states she began experiencing a migraine as well as double vision from her right periphery yesterday.  She states she then experienced 2 witnessed seizures by her  back-to-back and each one lasting a couple minutes.  She does endorse urinary incontinence associated with the seizures.  Patient states she is compliant with her Keppra and Vimpat and has not missed any doses.  Prior to yesterday, she does not believe she has had any seizures since June.  She states she had a shunt placed a couple months ago and follows closely with neurosurgery as well as ophthalmology given her significant medical history.  Patient denies any current symptoms at this time including headache, lightheadedness, dizziness, vision changes, fevers, flulike symptoms, weakness, confusion, disorientation, or nausea/vomiting.              Patient History   Past Medical History:   Diagnosis Date    Abnormal findings on diagnostic imaging of other abdominal regions, including retroperitoneum 10/14/2020    Abnormal CT of the abdomen    Acquired deformity of nose 03/24/2022    Nasal deformity    Acute upper respiratory infection, unspecified 10/16/2019    Acute URI    Adrenal disease (Multi)     Allergic     Allergy status to unspecified drugs, medicaments and biological substances 05/22/2020    History of drug allergy    Allergy status to unspecified drugs, medicaments and biological substances 11/13/2020    History of adverse drug reaction    Anemia     Anxiety 2005    Asthma     Benign intracranial hypertension 01/27/2022     Pseudotumor cerebri    Bipolar disorder, unspecified (Multi)     Bipolar disease, chronic    Breast calcification, right 08/21/2018    Cellulitis of abdominal wall 09/28/2022    Cellulitis of right abdominal wall    Cervicalgia 07/01/2020    Cervicalgia of [id number]-[id number] region    Chronic maxillary sinusitis 01/04/2022    Chronic maxillary sinusitis    Chronic sialoadenitis 03/16/2020    Chronic sialoadenitis    COVID-19 01/06/2022    COVID-19 with multiple comorbidities    Decreased white blood cell count, unspecified 11/04/2019    Leukopenia    Disease of thyroid gland     Disturbances of salivary secretion 03/16/2020    Xerostomia    Dry eye syndrome of bilateral lacrimal glands 10/07/2022    Dry eyes, bilateral    Encounter for preprocedural cardiovascular examination 02/01/2022    Preoperative cardiovascular examination    Food additives allergy status 06/11/2020    Allergy to food dye    Fracture of nasal bones, initial encounter for closed fracture 03/03/2022    Closed fracture of nasal bone, initial encounter    GERD (gastroesophageal reflux disease) 15 years old    Granuloma of right orbit 10/07/2021    Inflammatory pseudotumor of right orbit    History of endometrial ablation 11/09/2017    Hyperlipidemia     Hypertension     Hyperthyroidism     Hypoglycemia     Hypothyroidism     Immunocompromised     Localized swelling, mass and lump, head 03/24/2022    Swollen nose    Major depressive disorder, recurrent, in full remission (CMS-HCC) 10/07/2021    Depression, major, recurrent, in complete remission    Mammary duct ectasia of left breast 08/24/2022    Periductal mastitis of left breast    Meningitis ([facility]) 2008    Migraine     Nipple discharge 08/24/2022    Bloody discharge from left nipple    Ocular pain, right eye 10/07/2022    Pain in right eye    Optic atrophy     Other abnormal and inconclusive findings on diagnostic imaging of breast 07/06/2020    Other abnormal and inconclusive findings  on diagnostic imaging of breast    Other anomalies of pupillary function 05/31/2019    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    Other chest pain 05/18/2020    Chest discomfort    Other conditions influencing health status 08/01/2022    History of cough    Other conditions influencing health status 08/03/2021    Chronic migraine    Other specified disorders of eustachian tube, left ear 11/18/2019    ETD (Eustachian tube dysfunction), left    Other specified disorders of nose and nasal sinuses 03/24/2022    Nasal dryness    Other specified disorders of nose and nasal sinuses 03/24/2022    Nasal crusting    Pelvic and perineal pain 07/06/2020    Pelvic pain    Personal history of other diseases of the circulatory system 04/07/2020    History of sinus tachycardia    Personal history of other diseases of the circulatory system 04/07/2020    History of abnormal electrocardiography    Personal history of other diseases of the circulatory system     History of Raynaud's syndrome    Personal history of other diseases of the digestive system     History of irritable bowel syndrome    Personal history of other diseases of the digestive system 03/02/2020    History of oral pain    Personal history of other diseases of the musculoskeletal system and connective tissue 01/19/2022    History of neck pain    Personal history of other diseases of the musculoskeletal system and connective tissue 03/02/2021    History of scleroderma    Personal history of other diseases of the musculoskeletal system and connective tissue 06/16/2020    History of muscle weakness    Personal history of other diseases of the nervous system and sense organs 11/18/2019    History of hearing loss    Personal history of other diseases of the nervous system and sense organs 09/21/2022    History of partial seizures    Personal history of other diseases of the respiratory system 04/14/2021    History of asthma    Personal history of other endocrine,  nutritional and metabolic disease 02/17/2021    History of diabetes mellitus    Personal history of other mental and behavioral disorders 05/27/2021    History of anxiety    Personal history of other specified conditions 09/07/2022    History of nipple discharge    Personal history of other specified conditions 10/16/2019    History of headache    Personal history of other specified conditions 09/28/2022    History of lump of left breast    Personal history of other specified conditions 09/16/2021    History of persistent cough    Personal history of other specified conditions 02/01/2022    History of palpitations    Personal history of other specified conditions 03/09/2022    History of headache    Personal history of other specified conditions 02/12/2014    History of chest pain    Personal history of other specified conditions 10/27/2021    History of nausea and vomiting    Personal history of other specified conditions 10/16/2019    History of fatigue    Personal history of other specified conditions 02/26/2021    History of orthopnea    Personal history of other specified conditions 02/22/2021    History of shortness of breath    Personal history of urinary calculi     H/O renal calculi    Polycystic ovary syndrome     Postural orthostatic tachycardia syndrome (POTS)     POTS (postural orthostatic tachycardia syndrome)    Rash and other nonspecific skin eruption 03/15/2022    Rash    Repeated falls 06/23/2021    Recurrent falls    Right lower quadrant pain 10/14/2020    Abdominal pain, RLQ (right lower quadrant)    Seizures (Multi)     Sjogren syndrome, unspecified (Multi)     History of Sjogren's disease    Sjogren's syndrome     Sleep apnea     Slow transit constipation 07/09/2020    Slow transit constipation    Subarachnoid hemorrhage, traumatic (Multi) 04/19/2023    Thyroid nodule     Traumatic subarachnoid hemorrhage with loss of consciousness of unspecified duration, subsequent encounter 03/15/2022     Subarachnoid hemorrhage following injury, with loss of consciousness, subsequent encounter    Traumatic subarachnoid hemorrhage without loss of consciousness, subsequent encounter     Subarachnoid hemorrhage following injury, no loss of consciousness, subsequent encounter    Type 2 diabetes mellitus     Unspecified disorder of refraction 10/07/2022    Refractive error    Unspecified optic neuritis 11/06/2020    Right optic neuritis    Unspecified optic neuritis 11/06/2020    Optic neuritis, right    Unspecified visual loss 09/25/2019    Vision loss    Varicella As a child    Venous insufficiency (chronic) (peripheral) 10/18/2021    Chronic venous insufficiency of lower extremity    Viral infection, unspecified 01/11/2022    Nonspecific syndrome suggestive of viral illness    Vitamin D deficiency     Vitamin D deficiency, unspecified 09/28/2022    Vitamin D deficiency     Past Surgical History:   Procedure Laterality Date    APPENDECTOMY  2017    BRAIN SURGERY  Trilla placement to measure pressure    CARDIAC CATHETERIZATION      CHOLECYSTECTOMY      ENDOMETRIAL ABLATION      FRACTURE SURGERY  12/2023    HERNIA REPAIR Right 03/01/2024    with mesh    HYSTERECTOMY  2017    MR HEAD ANGIO WO IV CONTRAST  02/08/2021    MR HEAD ANGIO WO IV CONTRAST 2/8/2021 [facility]ICAL [facility]GAC[facility]    MR NECK ANGIO WO IV CONTRAST  02/08/2021    MR NECK ANGIO WO IV CONTRAST 2/8/2021 [facility] CLINICAL LEGACY    OTHER SURGICAL HISTORY  08/22/2019    Carpal tunnel surgery    OTHER SURGICAL HISTORY  08/22/2019    Hysterectomy    OTHER SURGICAL HISTORY  08/22/2019    Venous access port placement    OTHER SURGICAL HISTORY  08/22/2019    Cholecystectomy    OTHER SURGICAL HISTORY  08/22/2019    Appendectomy    OTHER SURGICAL HISTORY  08/22/2019    Pyloroplasty    WISDOM TOOTH EXTRACTION  2004     Family History   Problem Relation Name Age of Onset    Other (Perforated bowel) Mother Radha     Hypertension Mother Radha     Macular degeneration Mother  Radha     Depression Mother Radha     Hyperlipidemia Mother Radha     Mental illness Mother Radha     Hyperlipidemia Father Mac     Hypertension Father Mac     Heart attack Father Mac     Other (S/P CABG) Father Mac     Diabetes Father Mac     Prostate cancer Father Mac     Coronary artery disease Father Mac     Heart failure Father Mac     Stroke Father Mac     Cancer Father Mac     Macular degeneration Father Mac     Retinal detachment Father Mac     Asthma Father Mac     Hearing loss Father Mac     Heart disease Father Mac     Hernia Father Mac     Stroke Sister Jazmin     Breast cancer Sister Jazmin     Lupus Sister Jazmin     Ovarian cancer Sister Jazmin     Astigmatism Sister Jazmin     Arthritis Sister Jazmin     Asthma Sister Jazmin     Depression Sister Jazmin     Mental illness Sister Jazmin     Miscarriages / Stillbirths Sister Jazmin     Vision loss Sister Jazmin     Hyperlipidemia Brother Sanchez     Hypertension Brother Sanchez     Astigmatism Brother Sanchez     Arthritis Brother Sanchez     Asthma Brother Sanchez     Diabetes Father's Sister Linda     Blindness Father's Sister Linda     Vision loss Father's Sister Linda     Thyroid cancer Father's Sister Bekah     Thyroid disease Father's Sister Jessica     Colon cancer Father's Brother ?     Cancer Father's Brother Kian     Anesthesia related problems Father's Brother Kian     Depression Father's Brother Kian     Hernia Father's Brother Kian     Mental illness Father's Brother Kian     Cancer Maternal Grandmother Brenda     Ovarian cancer Maternal Grandmother Brenda     Blindness Other Multiple     Melanoma Other      Asthma Other      Other (hay fever) Other      Allergies Other      Alcohol abuse Paternal Grandfather Elkin     Arthritis Sister Isha     Asthma Sister Isha     Cancer Sister Isha     Depression Sister Isha     Mental illness Sister Isha     Miscarriages / Stillbirths Sister Isha     Ovarian cancer Sister Isha     Glaucoma Neg Hx        Social History     Tobacco Use    Smoking status: Never    Smokeless tobacco: Never   Vaping Use    Vaping status: Never Used   Substance Use Topics    Alcohol use: Not Currently     Comment: Socially in College    Drug use: Yes     Types: Benzodiazepines       Physical Exam   ED Triage Vitals [01/08/25 1435]   Temperature Heart Rate Respirations BP   36.5 °C (97.7 °F) 98 16 148/72      Pulse Ox Temp Source Heart Rate Source Patient Position   98 % Temporal -- --      BP Location FiO2 (%)     -- --       Physical Exam  Vitals reviewed.   Constitutional:       General: She is not in acute distress.     Appearance: She is not ill-appearing.   HENT:      Head: Normocephalic and atraumatic.   Eyes:      Extraocular Movements: Extraocular movements intact.      Conjunctiva/sclera: Conjunctivae normal.      Comments: Dilated from ophthalmology exam.   Cardiovascular:      Rate and Rhythm: Normal rate.   Pulmonary:      Effort: Pulmonary effort is normal. No respiratory distress.      Breath sounds: Normal breath sounds.   Abdominal:      General: Bowel sounds are normal.      Palpations: Abdomen is soft.      Tenderness: There is no abdominal tenderness.   Musculoskeletal:      Cervical back: Normal range of motion and neck supple.   Skin:     General: Skin is warm and dry.   Neurological:      General: No focal deficit present.      Mental Status: She is alert and oriented to person, place, and time.      Cranial Nerves: Cranial nerves 2-12 are intact.      Sensory: Sensation is intact.      Motor: Motor function is intact.      Coordination: Coordination is intact.      Gait: Gait is intact.           ED Course & MDM   Diagnoses as of 01/09/25 1432   Seizure (Multi)   Nonintractable headache, unspecified chronicity pattern, unspecified headache type     Labs Reviewed   CBC WITH AUTO DIFFERENTIAL - Abnormal       Result Value    WBC 4.5      nRBC 0.0      RBC 4.27      Hemoglobin 11.5 (*)     Hematocrit 35.8 (*)      MCV 84      MCH 26.9      MCHC 32.1      RDW 15.2 (*)     Platelets 279      Neutrophils % 56.2      Immature Granulocytes %, Automated 0.2      Lymphocytes % 35.6      Monocytes % 6.9      Eosinophils % 0.4      Basophils % 0.7      Neutrophils Absolute 2.51      Immature Granulocytes Absolute, Automated 0.01      Lymphocytes Absolute 1.59      Monocytes Absolute 0.31      Eosinophils Absolute 0.02      Basophils Absolute 0.03     COMPREHENSIVE METABOLIC PANEL - Abnormal    Glucose 177 (*)     Sodium 138      Potassium 3.9      Chloride 102      Bicarbonate 29      Anion Gap 11      Urea Nitrogen 18      Creatinine 0.88      eGFR 82      Calcium 9.5      Albumin 4.1      Alkaline Phosphatase 103      Total Protein 8.1      AST 15      Bilirubin, Total 0.2      ALT 37     LEVETIRACETAM - Abnormal    Keppra 42 (*)     Narrative:     Brivaracetam may falsely increase the amount of Levetiracetam measured by this method. Serum levels should be confirmed by a valid chromatographic method  for patients with these drugs co-present in circulation.   SEDIMENTATION RATE, AUTOMATED - Abnormal    Sedimentation Rate 52 (*)    GLUCOSE, CSF - Abnormal    Glucose, CSF 79 (*)    PROTEIN, TOTAL CSF - Abnormal    Total Protein,  (*)    CSF CELL COUNT - Abnormal    Tube Number, CSF Tube 1      Color, CSF Pink (*)     Clarity, CSF Hazy (*)     WBC, CSF 6 (*)     RBC, CSF 3,000 (*)     Narrative:     CSF cell count reference ranges have not been established by [facility]. Based on published references.   CSF DIFFERENTIAL - Abnormal    Neutrophils %, Manual, CSF 0      Lymphocytes %, Manual, CSF 14 (*)     Mono/Macrophages %, Manual, CSF 43      Eosinophils %, Manual, CSF 43      Basophils %, Manual, CSF 0      Immature Granulocytes %, Manual, CSF 0      Blasts %, Manual, CSF 0      Unclassified Cells %, Manual, CSF 0      Plasma Cells %, Manual, CSF 0      Total Cells Counted, CSF 14      Narrative:      CSF cell differential reference ranges have not been established by [facility]. Based on published references.   POCT GLUCOSE - Abnormal    POCT Glucose 124 (*)    POCT GLUCOSE - Abnormal    POCT Glucose 153 (*)    POCT GLUCOSE - Abnormal    POCT Glucose 169 (*)    COAGULATION SCREEN - Normal    Protime 10.9      INR 1.0      aPTT 34      Narrative:     The APTT is no longer used for monitoring Unfractionated Heparin Therapy. For monitoring Heparin Therapy, use the Heparin Assay.   HUMAN CHORIONIC GONADOTROPIN, SERUM QUANTITATIVE - Normal    HCG, Beta-Quantitative 3      Narrative:     Total HCG measurement is performed using the Siemens How do you roll? immunoassay which detects intact HCG and free beta HCG subunit.  This test is not indicated for use as a tumor marker.  HCG testing is performed using a different test methodology at [facility] than other [facility]. Direct result comparison  should only be made within the same method.          C-REACTIVE PROTEIN - Normal    C-Reactive Protein 0.54     CSF CULTURE/SMEAR    CSF Culture/Smear No growth to date      Gram Stain No polymorphonuclear leukocytes seen      Gram Stain No organisms seen     TYPE AND SCREEN    ABO TYPE A      Rh TYPE POS      ANTIBODY SCREEN NEG     CSF CELL COUNT WITH DIFFERENTIAL    Narrative:     The following orders were created for panel order CSF cell count with differential.  Procedure                               Abnormality         Status                     ---------                               -----------         ------                     CSF Cell Count[[id number]]               Abnormal            Final result               CSF Differential[[id number]]             Abnormal            Final result                 Please view results for these tests on the individual orders.   VALPROIC ACID, FREE, SERUM   LACOSAMIDE   POCT GLUCOSE METER   POCT GLUCOSE METER   POCT GLUCOSE METER   POCT GLUCOSE  METER     XR skull 1-3 views   Final Result   1. Lateral view of the skull obtained for purposes of neurosurgery   shunt setting check.   2. No kinking or discontinuity along the visualized portions of the   catheter.        I personally reviewed the images/study and I agree with the findings   as stated by Chester Coffman MD.        MACRO:   None        Signed by: Ernie Fiore 1/8/2025 10:48 PM   Dictation workstation:   [id number]      CT abdomen pelvis w IV contrast   Final Result   1. No acute abnormality within the abdomen or pelvis.   2.  shunt tubing terminates along the left pericolic gutter just   anterior to the mid descending colon. No appreciable cyst about the   tip of the tube.        I personally reviewed the images/study and I agree with the findings   as stated by Chester Coffman MD.        MACRO:   None        Signed by: Ernie Fiore 1/8/2025 9:21 PM   Dictation workstation:   [id number]      CT head wo IV contrast   Final Result   1. No acute intracranial abnormality.   2. Similar positioning of the right frontal approach ventriculostomy   catheter and stable appearance of the ventricles.        I personally reviewed the images/study and I agree with the findings   as stated by Chester Coffman MD.        MACRO:   None.        Signed by: Ernie Fiore 1/8/2025 7:25 PM   Dictation workstation:   [id number]      XR shunt series   Final Result   1.  Right frontal ventriculostomy shunt catheter, as described above.   2.  Low lung volumes with mild bibasilar atelectasis.   3.  Nonobstructive bowel gas pattern.        Signed by: Boo Davis 1/8/2025 6:43 PM   Dictation workstation:   [id number]                  No data recorded                                 Medical Decision Making  Patient is a 49-year-old female with an extensive past medical history consisting of left anterior optic neuropathy, IIH s/p  shunt, HTN, DM2, hypothyroidism, seizure disorder, POTS, CVID receiving monthly  IVIG, Sjogren's syndrome, scleroderma, BRENT on CPAP, and migraines presenting to the ED with headache and seizures.  History was obtained from the patient appear to be experiencing a right sided migraine and double vision from right periphery yesterday.  Then had 2 witnessed seizures by her head and back-to-back lasting a few minutes.  Had urinary incontinence associated with the seizures.  Compliant with Keppra and Vimpat and has not missed any doses.  Does not believe she has had any seizures since June before yesterday.  States she had a  shunt placed a few months ago and follows closely with many outpatient providers.  States she is completely asymptomatic at this time and denies any current symptoms, as noted in HPI.  On physical exam, patient is sitting comfortably and in no apparent distress.  EOMs intact and conventional conjunctiva normal.  Bilateral pupils are still dilated from ophthalmology exam.  Neurologically intact in terms of sensation, motor function, coordination, and gait without obvious deficit.  Remainder of exam as noted above.  Vital signs are stable.    The ophthalmology team was aware about this patient's arrival and they follow her closely as an outpatient.  They pulled her from the waiting room and brought her back into our consult room to do their exam.  Please see their consult note for more details.  They did not find any gross abnormalities associated with the patient's eyes.  They recommended we get the patient into a room soon as possible and put in a consult for the neurosurgery team.  NIPP orders were placed in triage including CBC, CMP, valproic acid level, Keppra level, and Vimpat level.  Also ordered head CT and XR shunt imaging.   Neurosurgery consult placed and they came to evaluate the patient at bedside.  They recommended head x-ray, consulting neurology for seizures, ophthalmology recommendations, preop labs, inflammatory markers, and they will plan to perform a LP to  assess opening pressure.  They also discussed with me they were concerned about a pseudocyst in her abdomen and recommended obtaining CT abdomen/pelvis with and without contrast.  It does appear the patient presented to a surrounding ED with abdominal pain 4 days ago and had an unremarkable CT scan performed at that time.  I shared this information with the neurosurgery team who told me they still wanted this performed again today.  Therefore, abdomen/pelvis CT order placed along with a consult to the neurology team.  Please see their note also for more details.  They recommended continuing the patient's home doses of AEDs.  Wanted her to follow-up with her [facility] epilepsy provider after discharge.  CBC without leukocytosis.  H&H stable at 11.5 and 35.8.  CMP with slight hyperglycemia of 177 and otherwise grossly unremarkable.  Keppra level very mildly elevated at 42.  Vimpat level pending.  Head CT without intracranial abnormality and similar positioning of right frontal  shunt.  Shunt skull x-ray did not identify any abnormalities associated with the shunt.  Additional labs and imaging recommended by the neurosurgery team pending.  They did come down to perform a lumbar puncture and the patient was moved into one of the ED rooms.  They message me they would admit the patient to their service for observation.  Therefore, patient was admitted to the neurosurgery team under the care team of Dr. Tafoya.  Please refer to the admitting team for further workup and care.  Patient otherwise remains stable and asymptomatic here in the ED awaiting transport to the floor.        Procedure  Procedures     Raquel Pro PA-C  01/09/25 [id number]

## 2025-01-08 NOTE — CONSULTS
Inpatient consult to Neurosurgery  Consult performed by: Jesus Lambert MD  Consult ordered by: Raquel Pro PA-C      Date of Service:  1/8/2025 Attending Provider:  Deborah Johns DO     Reason for Consultation:  Jonathan Ayala is being seen today for a consult requested by Deborah Johns DO for  shunt eval.    Subjective   History of Present Illness:  Jonathan is a 49 y.o. female with No Principal Problem: There is no principal problem currently on the Problem List. Please update the Problem List and refresh.    Patient with couple weeks of nausea and vomiting and previously seen at OSH, CT AP at that time negative. However yesterday evening had two back to back episodes of LUE shaking which is consistent with her seizure semiology. Reported that she has been taking her AEDs as usual. Otherwise also developed double vision and increased blurry vision to her remaining vision.     Patient denied any weakness, numbness, vision changes, fevers or chills.    Review of Systems negative other than listed in HPI.    Objective   Vitals:  Vitals:    01/08/25 1435   BP: 148/72   Pulse: 98   Resp: 16   Temp: 36.5 °C (97.7 °F)   SpO2: 98%         Exam:  Constitutional: No acute distress  Resp: breathing comfortably  Cardio: well perfused  GI: nondistended  MSK: full range of motion  Neuro: Awake, Ox3  face symmetric  RUE 5/5  LUE 5/5  RLE 5/5  LLE 5/5  sensation intact to light touch  Psych: appropriate  Skin: incisions healed and intact    Medical History  Past Medical History:   Diagnosis Date    Abnormal findings on diagnostic imaging of other abdominal regions, including retroperitoneum 10/14/2020    Abnormal CT of the abdomen    Acquired deformity of nose 03/24/2022    Nasal deformity    Acute upper respiratory infection, unspecified 10/16/2019    Acute URI    Adrenal disease (Multi)     Allergic     Allergy status to unspecified drugs, medicaments and biological substances 05/22/2020    History of drug allergy     Allergy status to unspecified drugs, medicaments and biological substances 11/13/2020    History of adverse drug reaction    Anemia     Anxiety 2005    Asthma     Benign intracranial hypertension 01/27/2022    Pseudotumor cerebri    Bipolar disorder, unspecified (Multi)     Bipolar disease, chronic    Breast calcification, right 08/21/2018    Cellulitis of abdominal wall 09/28/2022    Cellulitis of right abdominal wall    Cervicalgia 07/01/2020    Cervicalgia of wnxymybz-zkvvuxb-geipd region    Chronic maxillary sinusitis 01/04/2022    Chronic maxillary sinusitis    Chronic sialoadenitis 03/16/2020    Chronic sialoadenitis    COVID-19 01/06/2022    COVID-19 with multiple comorbidities    Decreased white blood cell count, unspecified 11/04/2019    Leukopenia    Disease of thyroid gland     Disturbances of salivary secretion 03/16/2020    Xerostomia    Dry eye syndrome of bilateral lacrimal glands 10/07/2022    Dry eyes, bilateral    Encounter for preprocedural cardiovascular examination 02/01/2022    Preoperative cardiovascular examination    Food additives allergy status 06/11/2020    Allergy to food dye    Fracture of nasal bones, initial encounter for closed fracture 03/03/2022    Closed fracture of nasal bone, initial encounter    GERD (gastroesophageal reflux disease) 15 years old    Granuloma of right orbit 10/07/2021    Inflammatory pseudotumor of right orbit    History of endometrial ablation 11/09/2017    Hyperlipidemia     Hypertension     Hyperthyroidism     Hypoglycemia     Hypothyroidism     Immunocompromised     Localized swelling, mass and lump, head 03/24/2022    Swollen nose    Major depressive disorder, recurrent, in full remission (CMS-HCC) 10/07/2021    Depression, major, recurrent, in complete remission    Mammary duct ectasia of left breast 08/24/2022    Periductal mastitis of left breast    Meningitis ([facility]) 2008    Migraine     Nipple discharge 08/24/2022    Bloody discharge from left  nipple    Ocular pain, right eye 10/07/2022    Pain in right eye    Optic atrophy     Other abnormal and inconclusive findings on diagnostic imaging of breast 07/06/2020    Other abnormal and inconclusive findings on diagnostic imaging of breast    Other anomalies of pupillary function 05/31/2019    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    Other chest pain 05/18/2020    Chest discomfort    Other conditions influencing health status 08/01/2022    History of cough    Other conditions influencing health status 08/03/2021    Chronic migraine    Other specified disorders of eustachian tube, left ear 11/18/2019    ETD (Eustachian tube dysfunction), left    Other specified disorders of nose and nasal sinuses 03/24/2022    Nasal dryness    Other specified disorders of nose and nasal sinuses 03/24/2022    Nasal crusting    Pelvic and perineal pain 07/06/2020    Pelvic pain    Personal history of other diseases of the circulatory system 04/07/2020    History of sinus tachycardia    Personal history of other diseases of the circulatory system 04/07/2020    History of abnormal electrocardiography    Personal history of other diseases of the circulatory system     History of Raynaud's syndrome    Personal history of other diseases of the digestive system     History of irritable bowel syndrome    Personal history of other diseases of the digestive system 03/02/2020    History of oral pain    Personal history of other diseases of the musculoskeletal system and connective tissue 01/19/2022    History of neck pain    Personal history of other diseases of the musculoskeletal system and connective tissue 03/02/2021    History of scleroderma    Personal history of other diseases of the musculoskeletal system and connective tissue 06/16/2020    History of muscle weakness    Personal history of other diseases of the nervous system and sense organs 11/18/2019    History of hearing loss    Personal history of other diseases of  the nervous system and sense organs 09/21/2022    History of partial seizures    Personal history of other diseases of the respiratory system 04/14/2021    History of asthma    Personal history of other endocrine, nutritional and metabolic disease 02/17/2021    History of diabetes mellitus    Personal history of other mental and behavioral disorders 05/27/2021    History of anxiety    Personal history of other specified conditions 09/07/2022    History of nipple discharge    Personal history of other specified conditions 10/16/2019    History of headache    Personal history of other specified conditions 09/28/2022    History of lump of left breast    Personal history of other specified conditions 09/16/2021    History of persistent cough    Personal history of other specified conditions 02/01/2022    History of palpitations    Personal history of other specified conditions 03/09/2022    History of headache    Personal history of other specified conditions 02/12/2014    History of chest pain    Personal history of other specified conditions 10/27/2021    History of nausea and vomiting    Personal history of other specified conditions 10/16/2019    History of fatigue    Personal history of other specified conditions 02/26/2021    History of orthopnea    Personal history of other specified conditions 02/22/2021    History of shortness of breath    Personal history of urinary calculi     H/O renal calculi    Polycystic ovary syndrome     Postural orthostatic tachycardia syndrome (POTS)     POTS (postural orthostatic tachycardia syndrome)    Rash and other nonspecific skin eruption 03/15/2022    Rash    Repeated falls 06/23/2021    Recurrent falls    Right lower quadrant pain 10/14/2020    Abdominal pain, RLQ (right lower quadrant)    Seizures (Multi)     Sjogren syndrome, unspecified (Multi)     History of Sjogren's disease    Sjogren's syndrome     Sleep apnea     Slow transit constipation 07/09/2020    Slow transit  constipation    Subarachnoid hemorrhage, traumatic (Multi) 04/19/2023    Thyroid nodule     Traumatic subarachnoid hemorrhage with loss of consciousness of unspecified duration, subsequent encounter 03/15/2022    Subarachnoid hemorrhage following injury, with loss of consciousness, subsequent encounter    Traumatic subarachnoid hemorrhage without loss of consciousness, subsequent encounter     Subarachnoid hemorrhage following injury, no loss of consciousness, subsequent encounter    Type 2 diabetes mellitus     Unspecified disorder of refraction 10/07/2022    Refractive error    Unspecified optic neuritis 11/06/2020    Right optic neuritis    Unspecified optic neuritis 11/06/2020    Optic neuritis, right    Unspecified visual loss 09/25/2019    Vision loss    Varicella As a child    Venous insufficiency (chronic) (peripheral) 10/18/2021    Chronic venous insufficiency of lower extremity    Viral infection, unspecified 01/11/2022    Nonspecific syndrome suggestive of viral illness    Vitamin D deficiency     Vitamin D deficiency, unspecified 09/28/2022    Vitamin D deficiency       Surgical History  Past Surgical History:   Procedure Laterality Date    APPENDECTOMY  2017    BRAIN SURGERY  [place] placement to measure pressure    CARDIAC CATHETERIZATION      CHOLECYSTECTOMY      ENDOMETRIAL ABLATION      FRACTURE SURGERY  12/2023    HERNIA REPAIR Right 03/01/2024    with mesh    HYSTERECTOMY  2017    MR HEAD ANGIO WO IV CONTRAST  02/08/2021    MR HEAD ANGIO WO IV CONTRAST 2/8/2021 [facility]    MR NECK ANGIO WO IV CONTRAST  02/08/2021    MR NECK ANGIO WO IV CONTRAST 2/8/2021 [facility]    OTHER SURGICAL HISTORY  08/22/2019    Carpal tunnel surgery    OTHER SURGICAL HISTORY  08/22/2019    Hysterectomy    OTHER SURGICAL HISTORY  08/22/2019    Venous access port placement    OTHER SURGICAL HISTORY  08/22/2019    Cholecystectomy    OTHER SURGICAL HISTORY  08/22/2019    Appendectomy    OTHER SURGICAL  HISTORY  08/22/2019    Pyloroplasty    WISDOM TOOTH EXTRACTION  2004        Medications  Current Outpatient Medications   Medication Instructions    acetaminophen (TYLENOL) 325-650 mg, Every 6 hours PRN    Advair -21 mcg/actuation inhaler INHALE TWO PUFFS BY MOUTH AS INSTRUCTED TWO TIMES A DAY.    amitriptyline (ELAVIL) 100 mg, oral, Nightly    azelastine (Astelin) 137 mcg (0.1 %) nasal spray 1 spray, 2 times daily    blood-glucose sensor (DEXCOM G7 SENSOR MISC) Use to check blood sugar continuously throughout the day as directed. Change sensor every 10 days.    calcium-vitamin D3-vitamin K (Viactiv) 650 mg-12.5 mcg-40 mcg chewable tablet 2 tablets, Daily    carboxymethylcellulose (Refresh Celluvisc) 1 % ophthalmic solution dropperette 2 drops, 3 times daily PRN    cholecalciferol (Vitamin D-3) 5,000 Units tablet 1 tablet, Daily    clonazePAM (KlonoPIN) 0.5 mg tablet 1 tablet, 2 times daily    Dexcom G7  misc Use as instructed to check blood sugars continuously throughout the day    dicyclomine (BENTYL) 20 mg, oral, 4 times daily PRN    divalproex (Depakote ER) 500 mg 24 hr tablet 1 tablet, 2 times daily    EPINEPHrine 0.3 mg/0.3 mL injection syringe INJECT INTRAMUSCULARLY ONCE AS NEEDED FOR ANAPHYLAXIS    ezetimibe (ZETIA) 10 mg, oral, Daily    fluticasone (Flonase) 50 mcg/actuation nasal spray USE 1 SPRAY INTO EACH NOSTRIL ONCE DAILY.    folic acid (FOLVITE) 1 mg, oral, Daily    furosemide (LASIX) 20 mg, oral, 2 times daily    gloves, latex with aloe vera misc Large size gloves    glucagon (Baqsimi) 3 mg/actuation spray,non-aerosol USE ONE SPRAY IN THE NOSE AS NEEDED FOR LOW BLOOD SUGAR  MAY REPEAT AFTER 15 MINUTES USING A NEW DEVICE IF NO RESPONSE    glucagon (GLUCAGEN) 1 mg, subcutaneous, Once as needed    hydrocortisone (CORTEF) 10 mg, oral, 2 times daily, Double the dose if sick for three days    immune globulin, human, (Gammagard) infusion Infuse 600 mL (60 g) into a venous catheter every 14  "(fourteen) days.    insulin glargine (Toujeo Max Solostar- 2 unit dial) 300 unit/mL (3 mL) injection Inject 54 units under the skin once daily    insulin lispro (HumaLOG KwikPen Insulin) 100 unit/mL injection Use as directed to give up to 100 units a day    ipratropium-albuteroL (Duo-Neb) 0.5-2.5 mg/3 mL nebulizer solution 3 mL, 4 times daily PRN    lacosamide (Vimpat) 200 mg tablet tablet 1 tablet, 2 times daily    lacosamide (Vimpat) 50 mg tablet Take 1 tablet (50 mg) by mouth every 12 hours.    levETIRAcetam (KEPPRA) 1,500 mg, 2 times daily    levothyroxine (SYNTHROID, LEVOXYL) 75 mcg, oral, Daily before breakfast    miconazole (Micotin) 2 % cream 1 Application, Daily PRN    miconazole (Micotin) 2 % powder Apply to groin , under the breast and skin folds daily    miscellaneous medical supply [facility] [employer]  fragrance free    moisturizing mouth (Biotene Oral Dry Mouth) solution 1 spray, 4 times daily PRN    montelukast (SINGULAIR) 10 mg, oral, Nightly    Motegrity 2 mg tablet 1 tablet, Daily    nasal spray Nayzilam 5 mg/spray (0.1 mL) spray,non-aerosol Administer into affected nostril(s).    ondansetron ODT (ZOFRAN-ODT) 4 mg, oral, Every 8 hours PRN    OneTouch Delica Plus Lancet 33 gauge misc USE 1 LANCET TO CHECK GLUCOSE ONCE DAILY AS DIRECTED    OneTouch Verio test strips strip USE 1 STRIP TO CHECK GLUCOSE ONCE DAILY AS DIRECTED    pantoprazole (PROTONIX) 40 mg, 2 times daily before meals    pen needle, diabetic (BD Lela 2nd Gen Pen Needle) 32 gauge x 5/32\" needle Use as directed with insulin pen up to 5 times daily    polyethylene glycol (GLYCOLAX, MIRALAX) 17 g, Daily PRN    propranolol LA (INDERAL LA) 60 mg, oral, Daily, Do not crush, chew, or split.    Reyvow 100 mg, oral, As needed, Do not drive in 8 hours of taking this medicine. Maximum one dose per 24 hours.    rOPINIRole (Requip) 0.5 mg tablet Take 1 tablet by mouth (0.5mg) in the morning and 2 tablets (1mg) by mouth in the evening    senna 8.6 mg " tablet 1-2 tablets, Nightly PRN    tiZANidine (ZANAFLEX) 2 mg, Every 8 hours PRN    Ubrelvy 100 mg, oral, As needed, May repeat in 2 hours for max of 200mg per 24 hours.    ZINC ORAL 50 mg, Daily        Diagnostic Results:    Lab Results   Component Value Date    WBC 4.5 01/08/2025    HGB 11.5 (L) 01/08/2025    HCT 35.8 (L) 01/08/2025    MCV 84 01/08/2025     01/08/2025     Lab Results   Component Value Date    CREATININE 0.88 01/08/2025    BUN 18 01/08/2025     01/08/2025    K 3.9 01/08/2025     01/08/2025    CO2 29 01/08/2025     Lab Results   Component Value Date    INR 1.0 12/09/2024    INR 1.0 11/15/2024    INR 1.0 11/06/2024    PROTIME 10.9 12/09/2024    PROTIME 11.5 11/15/2024    PROTIME 10.7 11/06/2024       === 01/04/25 ===    CT HEAD WO IV CONTRAST    - Impression -  1. No acute intracranial hemorrhage or acute territorial infarct.  2. Right frontal approach ventriculostomy shunt catheter. Stable  appearance of the ventricles.          MACRO:  None.    Signed by: Lawanda Elias 1/4/2025 3:48 AM  Dictation workstation:   [id number]  === 10/23/24 ===    MR ORBIT W AND WO IV CONTRAST    - Impression -  MR brain:    Limited evaluation of the right frontoparietal region due to  susceptibility artifact.  1. Interval placement of right frontal approach ventriculostomy  catheter with tip terminating within the foramen of Monro.  Unremarkable size and appearance of the ventricular system. Gliosis  within the right superior frontal lobe and anterior frontal white  matter along the course of the ventriculostomy catheter.  2. No evidence of acute infarct, intracranial mass effect, or midline  shift.      MR orbit:    Redemonstration of asymmetric atrophy of the left optic nerve. No  evidence of abnormal enhancement or intraorbital mass. The right  optic nerve is normal in size.      I personally reviewed the images/study and I agree with the findings  as stated by Dr. Brody Daley M.D. This  study was interpreted at  [facility], [place], [place].    MACRO:  None    Signed by: John Mcnamara 10/23/2024 9:37 PM  Dictation workstation:   [id number]    Assessment/Plan   Assessment:  48 y.o. 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 9/17/2024 p/w 1 mo R eye blurry vision, 9/18 s/p LP (OP 52), 9/24/2024 s/p RF  shunt (certas at 5), 10/23 p/w 10 days of total right eye blindness, 10/25 s/p LP (OP28, CP9), 10/29 p/w HA and visual changes, 10/30 s/p R VPS exploration w abdominal catheter repositioning w improvement in distal runnoff, 11/15 p/w incisional leakage and headache, 11/15 s/p RF shunt wound revision and fractured valve replacement, 12/9 p/w wound dehiscence/incisional leakage, 12/10 s/p R crani wound exploration, washout and revision, 1/8 p/w double vision, nausea and vomiting, and 2 seizure events, CTH stable vents, SS intact, CT AP no pseudocyst    Patient nausea, vomiting, vision changes (although no discrete visual acuity changes per optho or optic disc edema), seizures, and some headaches. Possibly related to shunt malfunction however with no optic disc edema and CT AP without pseudocyst concern for shunt failure is decreased.     Plan:  -head xray to evaluate for shunt setting  -recommend neurology evaluation for seizures  -appreciate ophthalmology recs  -CBC/RFP/coag/T&S/UA/UPT (if applicable)/EKG/CXR  -ESR, CRP  -plan for LP for opening pressure    Jesus Lambert MD

## 2025-01-08 NOTE — CONSULTS
Reason For Consult  Right eye vision changes    History Of Present Illness  Jonathan Ayala is a 49 y.o. female with history of left non-arteritic anterior ischemic optic neuropathy,  bilateral sequential vision loss with right eye improvement 11/13/2019, idiopathic intracranial hypertension status post ventriculoperitoneal shunt last revised 11/15/2024, seizures, scleroderma, Sjogren, CVID on monthly IVIG, POTS, HTN, DM2, hypothyroidism, BRENT on CPAP, migraine presenting with right eye vision changes.    She presents today with right eye double vision/blurry vision which began yesterday evening. She states symptoms first started with headache. She then had two seizure events (left arm shaking, left head turn). She also had nausea and vomiting. She denies current pulsatile tinnitus, is unsure about transient visual obscurations, and denies flashes/floaters/eye pain.      Past Medical History  She has a past medical history of Abnormal findings on diagnostic imaging of other abdominal regions, including retroperitoneum (10/14/2020), Acquired deformity of nose (03/24/2022), Acute upper respiratory infection, unspecified (10/16/2019), Adrenal disease (Multi), Allergic, Allergy status to unspecified drugs, medicaments and biological substances (05/22/2020), Allergy status to unspecified drugs, medicaments and biological substances (11/13/2020), Anemia, Anxiety (2005), Asthma, Benign intracranial hypertension (01/27/2022), Bipolar disorder, unspecified (Multi), Breast calcification, right (08/21/2018), Cellulitis of abdominal wall (09/28/2022), Cervicalgia (07/01/2020), Chronic maxillary sinusitis (01/04/2022), Chronic sialoadenitis (03/16/2020), COVID-19 (01/06/2022), Decreased white blood cell count, unspecified (11/04/2019), Disease of thyroid gland, Disturbances of salivary secretion (03/16/2020), Dry eye syndrome of bilateral lacrimal glands (10/07/2022), Encounter for preprocedural cardiovascular examination  (02/01/2022), Food additives allergy status (06/11/2020), Fracture of nasal bones, initial encounter for closed fracture (03/03/2022), GERD (gastroesophageal reflux disease) (15 years old), Granuloma of right orbit (10/07/2021), History of endometrial ablation (11/09/2017), Hyperlipidemia, Hypertension, Hyperthyroidism, Hypoglycemia, Hypothyroidism, Immunocompromised, Localized swelling, mass and lump, head (03/24/2022), Major depressive disorder, recurrent, in full remission (CMS-HCC) (10/07/2021), 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), Meningitis ([facility]) (2008), Migraine, Nipple discharge (08/24/2022), Ocular pain, right eye (10/07/2022), Optic atrophy, Other abnormal and inconclusive findings on diagnostic imaging of breast (07/06/2020), Other anomalies of pupillary function (05/31/2019), Other chest pain (05/18/2020), Other conditions influencing health status (08/01/2022), Other conditions influencing health status (08/03/2021), Other specified disorders of eustachian tube, left ear (11/18/2019), Other specified disorders of nose and nasal sinuses (03/24/2022), Other specified disorders of nose and nasal sinuses (03/24/2022), Pelvic and perineal pain (07/06/2020), Personal history of other diseases of the circulatory system (04/07/2020), Personal history of other diseases of the circulatory system (04/07/2020), Personal history of other diseases of the circulatory system, Personal history of other diseases of the digestive system, Personal history of other diseases of the digestive system (03/02/2020), Personal history of other diseases of the musculoskeletal system and connective tissue (01/19/2022), Personal history of other diseases of the musculoskeletal system and connective tissue (03/02/2021), Personal history of other diseases of the musculoskeletal system and connective tissue (06/16/2020), Personal history of other diseases of the nervous system and sense organs (11/18/2019), Personal  history of other diseases of the nervous system and sense organs (09/21/2022), Personal history of other diseases of the respiratory system (04/14/2021), Personal history of other endocrine, nutritional and metabolic disease (02/17/2021), Personal history of other mental and behavioral disorders (05/27/2021), Personal history of other specified conditions (09/07/2022), Personal history of other specified conditions (10/16/2019), Personal history of other specified conditions (09/28/2022), Personal history of other specified conditions (09/16/2021), Personal history of other specified conditions (02/01/2022), Personal history of other specified conditions (03/09/2022), Personal history of other specified conditions (02/12/2014), Personal history of other specified conditions (10/27/2021), Personal history of other specified conditions (10/16/2019), Personal history of other specified conditions (02/26/2021), Personal history of other specified conditions (02/22/2021), Personal history of urinary calculi, Polycystic ovary syndrome, Postural orthostatic tachycardia syndrome (POTS), Rash and other nonspecific skin eruption (03/15/2022), Repeated falls (06/23/2021), Right lower quadrant pain (10/14/2020), Seizures (Multi), Sjogren syndrome, unspecified (Multi), Sjogren's syndrome, Sleep apnea, Slow transit constipation (07/09/2020), Subarachnoid hemorrhage, traumatic (Multi) (04/19/2023), Thyroid nodule, Traumatic subarachnoid hemorrhage with loss of consciousness of unspecified duration, subsequent encounter (03/15/2022), Traumatic subarachnoid hemorrhage without loss of consciousness, subsequent encounter, Type 2 diabetes mellitus, Unspecified disorder of refraction (10/07/2022), Unspecified optic neuritis (11/06/2020), Unspecified optic neuritis (11/06/2020), Unspecified visual loss (09/25/2019), Varicella (As a child), Venous insufficiency (chronic) (peripheral) (10/18/2021), Viral infection, unspecified  (01/11/2022), Vitamin D deficiency, and Vitamin D deficiency, unspecified (09/28/2022).    Surgical History  She has a past surgical history that includes Other surgical history (08/22/2019); Other surgical history (08/22/2019); Other surgical history (08/22/2019); Other surgical history (08/22/2019); Other surgical history (08/22/2019); Other surgical history (08/22/2019); MR angio neck wo IV contrast (02/08/2021); MR angio head wo IV contrast (02/08/2021); [place] tooth extraction (2004); Appendectomy (2017); Hysterectomy (2017); Hernia repair (Right, 03/01/2024); Cardiac catheterization; Cholecystectomy; Fracture surgery (12/2023); Endometrial ablation; and Brain surgery ([place] placement to measure pressure).    Social History  She reports that she has never smoked. She has never used smokeless tobacco. She reports that she does not currently use alcohol. She reports current drug use. Drug: Benzodiazepines.    Family History  Family History   Problem Relation Name Age of Onset    Other (Perforated bowel) Mother Radha     Hypertension Mother Radha     Macular degeneration Mother Radha     Depression Mother Radha     Hyperlipidemia Mother Radha     Mental illness Mother Radha     Hyperlipidemia Father Mac     Hypertension Father Mac     Heart attack Father Mac     Other (S/P CABG) Father Mac     Diabetes Father Mac     Prostate cancer Father Mac     Coronary artery disease Father Mac     Heart failure Father Mac     Stroke Father Mac     Cancer Father Mac     Macular degeneration Father Mac     Retinal detachment Father Mac     Asthma Father Mac     Hearing loss Father Mac     Heart disease Father Mac     Hernia Father Mac     Stroke Sister Jazmin     Breast cancer Sister Jazmin     Lupus Sister Jazmin     Ovarian cancer Sister Jazmin     Astigmatism Sister Jazmin     Arthritis Sister Jazmin     Asthma Sister Jazmin     Depression Sister Jazmin     Mental illness Sister Jazmin      Miscarriages / Stillbirths Sister Jazmin     Vision loss Sister Jazmin     Hyperlipidemia Brother Sanchez     Hypertension Brother Sanchez     Astigmatism Brother Sanchez     Arthritis Brother Sanchez     Asthma Brother Sanchez     Diabetes Father's Sister Linda     Blindness Father's Sister Linda     Vision loss Father's Sister Linda     Thyroid cancer Father's Sister Bekah     Thyroid disease Father's Sister Jessica     Colon cancer Father's Brother ?     Cancer Father's Brother Kian     Anesthesia related problems Father's Brother Kian     Depression Father's Brother Kian     Hernia Father's Brother Kian     Mental illness Father's Brother Kian     Cancer Maternal Grandmother Brenda     Ovarian cancer Maternal Grandmother Brenda     Blindness Other Multiple     Melanoma Other      Asthma Other      Other (hay fever) Other      Allergies Other      Alcohol abuse Paternal Grandfather Elkin     Arthritis Sister Isha     Asthma Sister Isha     Cancer Sister Isha     Depression Sister Isha     Mental illness Sister Isha     Miscarriages / Stillbirths Sister Isha     Ovarian cancer Sister Isha     Glaucoma Neg Hx          Allergies  Acetazolamide, Atorvastatin, Cefdinir, Ceftriaxone, Doxepin, Duloxetine, Fd and c red no.40, Levofloxacin, Levofloxacin in d5w, Nutritional supplements, Ozempic [semaglutide], Prochlorperazine, Red dye, Rosemary, Rosemary oil, Strawberry, Sulfa (sulfonamide antibiotics), Topiramate, Tree pollen-black walnut, Tree pollen-pecan, [place], Aripiprazole, Aspartame, Aspartame (bulk), Fenofibrate, Gluten, Hydrochlorothiazide, Hydromorphone, Iron, Meclizine, Metformin, Propoxyphene, Statins-hmg-coa reductase inhibitors, Tetracyclines, Thiazides, Venlafaxine, Wheat, Adhesive tape-silicones, Barbiturates, Ciprofloxacin, Dhe, Diet foods, Farxiga [dapagliflozin], Ferrous sulfate, Keflex [cephalexin], L.acidoph-l.zulayg-b.bif-s.therm, Nsaids (non-steroidal anti-inflammatory drug), Other, Sulfonylureas, Tobramycin,  "Vancomycin, Adhesive, Azithromycin, Betamethasone, House dust, Metoclopramide hcl, Prednisone, Propoxyphene-acetaminophen, Sulfacetamide sodium, Xvcbmxvo-4-rb9 antimigraine agents, and Zolpidem    Review of Systems  As above     Physical Exam  Base Eye Exam       Visual Acuity (+2.50 readers)         Right Left    Near cc 20/50-2 ph 20/30-1+2 20/50-1+2 ph 20/40-3      Correction: Glasses              Tonometry ([employer], 4:11 PM)         Right Left    Pressure 22 20              Pupils         Dark Light Shape React APD    Right 6 6 Round Bare reactivity None    Left 6 6 Round Bare reactivity None              Visual Fields         Left Right                                Extraocular Movement         Right Left     Full Full              Neuro/Psych       Oriented x3: Yes    Mood/Affect: Normal                  Additional Tests       Color         Right Left    [place] 0/14 0/14                  Slit Lamp and Fundus Exam       External Exam         Right Left    External Normal Normal              Slit Lamp Exam         Right Left    Lids/Lashes Normal Normal    Conjunctiva/Sclera White and quiet White and quiet    Cornea Clear Clear    Anterior Chamber Deep and quiet Deep and quiet    Iris Round and reactive Round and reactive    Lens Clear Clear    Anterior Vitreous Normal Normal              Fundus Exam         Right Left    Disc pallor temporally Pallor    C/D Ratio 0.15 0.15    Macula Normal Normal    Vessels Normal Normal    Periphery Normal Normal                     Last Recorded Vitals  Blood pressure 148/72, pulse 98, temperature 36.5 °C (97.7 °F), temperature source Temporal, resp. rate 16, height 1.575 m (5' 2\"), weight 115 kg (253 lb), SpO2 98%.    Relevant Results         Assessment/Plan     #Idiopathic intracranial hypertension  #NAION, both eyes   #S/p  shunt  #Monocular diplopia, right eye   - Jonathankaren Ayala is a 49 y.o. female with PMH of left non-arteritic anterior ischemic optic " neuropathy, bilateral sequential vision loss with right eye improvement 11/13/2019, idiopathic intracranial hypertension status post ventriculoperitoneal shunt , seizures, scleroderma, Sjogren, CVID on monthly IVIG, POTS, HTN, DM2, hypothyroidism, BRENT on CPAP, migraine  recently s/p  shunt revision on 10/30, s/p  shunt explantation and replacement of fractured valve and revision of cranial incision 11/15/24, presenting for headache, nausea, vomiting, seizures, and vision changes of right eye.     - Today, 1/8/25, pt reports monocular diplopia of right eye. Visual acuity is stable from prior exam. Optic nerves are pale with no evidence of disc edema.  - Absence of disc edema does not necessarily rule out elevated ICP and in this patient who presented with nausea, vomiting, recommend evaluation of shunt by neurosurgery.     Recommendations:   - Neurosurgery consultation   - Ophthalmology will continue to follow      Herminia Marie MD  Ophthalmology, PGY3    Ophthalmology Adult Pager - [phone]  Ophthalmology Pediatrics Pager (M-F 8:00am-5:00pm) - [phone]    For adult follow-up appointments, call: [phone]  For pediatric follow-up appointments, call: [phone]

## 2025-01-09 ENCOUNTER — PHARMACY VISIT (OUTPATIENT)
Dept: PHARMACY | Facility: CLINIC | Age: 50
End: 2025-01-09
Payer: MEDICAID

## 2025-01-09 ENCOUNTER — APPOINTMENT (OUTPATIENT)
Dept: NEUROLOGY | Facility: HOSPITAL | Age: 50
End: 2025-01-09
Payer: MEDICAID

## 2025-01-09 LAB
GLUCOSE BLD MANUAL STRIP-MCNC: 124 MG/DL (ref 74–99)
GLUCOSE BLD MANUAL STRIP-MCNC: 153 MG/DL (ref 74–99)
GLUCOSE BLD MANUAL STRIP-MCNC: 169 MG/DL (ref 74–99)
GLUCOSE BLD MANUAL STRIP-MCNC: 176 MG/DL (ref 74–99)
GLUCOSE BLD MANUAL STRIP-MCNC: 191 MG/DL (ref 74–99)

## 2025-01-09 PROCEDURE — 2500000004 HC RX 250 GENERAL PHARMACY W/ HCPCS (ALT 636 FOR OP/ED): Mod: SE

## 2025-01-09 PROCEDURE — 2500000001 HC RX 250 WO HCPCS SELF ADMINISTERED DRUGS (ALT 637 FOR MEDICARE OP): Mod: SE

## 2025-01-09 PROCEDURE — 96366 THER/PROPH/DIAG IV INF ADDON: CPT

## 2025-01-09 PROCEDURE — 95819 EEG AWAKE AND ASLEEP: CPT | Performed by: STUDENT IN AN ORGANIZED HEALTH CARE EDUCATION/TRAINING PROGRAM

## 2025-01-09 PROCEDURE — G0378 HOSPITAL OBSERVATION PER HR: HCPCS

## 2025-01-09 PROCEDURE — 96365 THER/PROPH/DIAG IV INF INIT: CPT

## 2025-01-09 PROCEDURE — 96372 THER/PROPH/DIAG INJ SC/IM: CPT

## 2025-01-09 PROCEDURE — 96367 TX/PROPH/DG ADDL SEQ IV INF: CPT

## 2025-01-09 PROCEDURE — 2500000001 HC RX 250 WO HCPCS SELF ADMINISTERED DRUGS (ALT 637 FOR MEDICARE OP): Mod: SE | Performed by: NEUROLOGICAL SURGERY

## 2025-01-09 PROCEDURE — 2500000004 HC RX 250 GENERAL PHARMACY W/ HCPCS (ALT 636 FOR OP/ED): Mod: SE | Performed by: NURSE PRACTITIONER

## 2025-01-09 PROCEDURE — 82947 ASSAY GLUCOSE BLOOD QUANT: CPT

## 2025-01-09 PROCEDURE — 94640 AIRWAY INHALATION TREATMENT: CPT

## 2025-01-09 PROCEDURE — 2500000002 HC RX 250 W HCPCS SELF ADMINISTERED DRUGS (ALT 637 FOR MEDICARE OP, ALT 636 FOR OP/ED): Mod: SE

## 2025-01-09 PROCEDURE — 95819 EEG AWAKE AND ASLEEP: CPT

## 2025-01-09 PROCEDURE — 96361 HYDRATE IV INFUSION ADD-ON: CPT

## 2025-01-09 RX ORDER — MONTELUKAST SODIUM 10 MG/1
10 TABLET ORAL NIGHTLY
Status: DISCONTINUED | OUTPATIENT
Start: 2025-01-09 | End: 2025-01-11 | Stop reason: HOSPADM

## 2025-01-09 RX ORDER — PROPRANOLOL HYDROCHLORIDE 60 MG/1
60 CAPSULE, EXTENDED RELEASE ORAL DAILY
Status: DISCONTINUED | OUTPATIENT
Start: 2025-01-09 | End: 2025-01-11 | Stop reason: HOSPADM

## 2025-01-09 RX ORDER — FLUTICASONE PROPIONATE AND SALMETEROL XINAFOATE 230; 21 UG/1; UG/1
2 AEROSOL, METERED RESPIRATORY (INHALATION)
Status: DISCONTINUED | OUTPATIENT
Start: 2025-01-09 | End: 2025-01-11 | Stop reason: HOSPADM

## 2025-01-09 RX ORDER — MAGNESIUM SULFATE 1 G/100ML
1 INJECTION INTRAVENOUS ONCE
Status: COMPLETED | OUTPATIENT
Start: 2025-01-09 | End: 2025-01-09

## 2025-01-09 RX ORDER — INSULIN LISPRO 100 [IU]/ML
0-10 INJECTION, SOLUTION INTRAVENOUS; SUBCUTANEOUS
Status: DISCONTINUED | OUTPATIENT
Start: 2025-01-09 | End: 2025-01-11 | Stop reason: HOSPADM

## 2025-01-09 RX ORDER — TIZANIDINE 4 MG/1
2 TABLET ORAL EVERY 8 HOURS PRN
Status: DISCONTINUED | OUTPATIENT
Start: 2025-01-09 | End: 2025-01-11 | Stop reason: HOSPADM

## 2025-01-09 RX ORDER — FUROSEMIDE 20 MG/1
20 TABLET ORAL 2 TIMES DAILY
Status: DISCONTINUED | OUTPATIENT
Start: 2025-01-09 | End: 2025-01-11 | Stop reason: HOSPADM

## 2025-01-09 RX ORDER — PANTOPRAZOLE SODIUM 40 MG/1
40 TABLET, DELAYED RELEASE ORAL
Status: DISCONTINUED | OUTPATIENT
Start: 2025-01-09 | End: 2025-01-11 | Stop reason: HOSPADM

## 2025-01-09 RX ORDER — LEVETIRACETAM 750 MG/1
1500 TABLET ORAL 2 TIMES DAILY
Status: DISCONTINUED | OUTPATIENT
Start: 2025-01-09 | End: 2025-01-11 | Stop reason: HOSPADM

## 2025-01-09 RX ORDER — SENNOSIDES 8.6 MG/1
1-2 TABLET ORAL NIGHTLY PRN
Status: DISCONTINUED | OUTPATIENT
Start: 2025-01-09 | End: 2025-01-11 | Stop reason: HOSPADM

## 2025-01-09 RX ORDER — ACETAMINOPHEN 325 MG/1
650 TABLET ORAL EVERY 6 HOURS PRN
Status: DISCONTINUED | OUTPATIENT
Start: 2025-01-09 | End: 2025-01-11 | Stop reason: HOSPADM

## 2025-01-09 RX ORDER — FOLIC ACID 1 MG/1
1 TABLET ORAL DAILY
Status: DISCONTINUED | OUTPATIENT
Start: 2025-01-09 | End: 2025-01-11 | Stop reason: HOSPADM

## 2025-01-09 RX ORDER — DEXTROSE 50 % IN WATER (D50W) INTRAVENOUS SYRINGE
12.5
Status: DISCONTINUED | OUTPATIENT
Start: 2025-01-09 | End: 2025-01-11 | Stop reason: HOSPADM

## 2025-01-09 RX ORDER — FLUTICASONE PROPIONATE 50 MCG
1 SPRAY, SUSPENSION (ML) NASAL DAILY
Status: DISCONTINUED | OUTPATIENT
Start: 2025-01-09 | End: 2025-01-11 | Stop reason: HOSPADM

## 2025-01-09 RX ORDER — DIVALPROEX SODIUM 500 MG/1
500 TABLET, FILM COATED, EXTENDED RELEASE ORAL 2 TIMES DAILY
Status: DISCONTINUED | OUTPATIENT
Start: 2025-01-09 | End: 2025-01-11 | Stop reason: HOSPADM

## 2025-01-09 RX ORDER — POLYETHYLENE GLYCOL 3350 17 G/17G
17 POWDER, FOR SOLUTION ORAL DAILY
Status: DISCONTINUED | OUTPATIENT
Start: 2025-01-09 | End: 2025-01-11 | Stop reason: HOSPADM

## 2025-01-09 RX ORDER — OXYCODONE HYDROCHLORIDE 5 MG/1
5 TABLET ORAL EVERY 4 HOURS PRN
Status: DISCONTINUED | OUTPATIENT
Start: 2025-01-09 | End: 2025-01-11 | Stop reason: HOSPADM

## 2025-01-09 RX ORDER — AMITRIPTYLINE HYDROCHLORIDE 100 MG/1
100 TABLET ORAL NIGHTLY
Status: DISCONTINUED | OUTPATIENT
Start: 2025-01-09 | End: 2025-01-11 | Stop reason: HOSPADM

## 2025-01-09 RX ORDER — DICLOFENAC SODIUM 50 MG/1
50 TABLET, DELAYED RELEASE ORAL 3 TIMES DAILY PRN
COMMUNITY
Start: 2024-12-01

## 2025-01-09 RX ORDER — DEXTROSE 50 % IN WATER (D50W) INTRAVENOUS SYRINGE
25
Status: DISCONTINUED | OUTPATIENT
Start: 2025-01-09 | End: 2025-01-11 | Stop reason: HOSPADM

## 2025-01-09 RX ORDER — DICYCLOMINE HYDROCHLORIDE 20 MG/1
20 TABLET ORAL 4 TIMES DAILY PRN
Status: DISCONTINUED | OUTPATIENT
Start: 2025-01-09 | End: 2025-01-11 | Stop reason: HOSPADM

## 2025-01-09 RX ORDER — LACOSAMIDE 100 MG/1
300 TABLET ORAL 2 TIMES DAILY
Status: DISCONTINUED | OUTPATIENT
Start: 2025-01-09 | End: 2025-01-11 | Stop reason: HOSPADM

## 2025-01-09 RX ORDER — LACOSAMIDE 50 MG/1
50 TABLET ORAL EVERY 12 HOURS
Status: DISCONTINUED | OUTPATIENT
Start: 2025-01-09 | End: 2025-01-09

## 2025-01-09 RX ORDER — CLONAZEPAM 0.5 MG/1
0.5 TABLET ORAL 2 TIMES DAILY
Status: DISCONTINUED | OUTPATIENT
Start: 2025-01-09 | End: 2025-01-11 | Stop reason: HOSPADM

## 2025-01-09 RX ORDER — HYDROCORTISONE 10 MG/1
10 TABLET ORAL 2 TIMES DAILY
Status: DISCONTINUED | OUTPATIENT
Start: 2025-01-09 | End: 2025-01-11 | Stop reason: HOSPADM

## 2025-01-09 RX ORDER — AZELASTINE 1 MG/ML
1 SPRAY, METERED NASAL 2 TIMES DAILY
Status: DISCONTINUED | OUTPATIENT
Start: 2025-01-09 | End: 2025-01-11 | Stop reason: HOSPADM

## 2025-01-09 RX ORDER — POLYETHYLENE GLYCOL 3350 17 G/17G
17 POWDER, FOR SOLUTION ORAL DAILY PRN
Status: DISCONTINUED | OUTPATIENT
Start: 2025-01-09 | End: 2025-01-09

## 2025-01-09 RX ORDER — EZETIMIBE 10 MG/1
10 TABLET ORAL DAILY
Status: DISCONTINUED | OUTPATIENT
Start: 2025-01-09 | End: 2025-01-11 | Stop reason: HOSPADM

## 2025-01-09 RX ORDER — ONDANSETRON HYDROCHLORIDE 2 MG/ML
4 INJECTION, SOLUTION INTRAVENOUS EVERY 8 HOURS PRN
Status: DISCONTINUED | OUTPATIENT
Start: 2025-01-09 | End: 2025-01-10

## 2025-01-09 RX ORDER — LEVOTHYROXINE SODIUM 75 UG/1
75 TABLET ORAL
Status: DISCONTINUED | OUTPATIENT
Start: 2025-01-09 | End: 2025-01-11 | Stop reason: HOSPADM

## 2025-01-09 RX ORDER — LEVOCETIRIZINE DIHYDROCHLORIDE 5 MG/1
1 TABLET, FILM COATED ORAL
COMMUNITY
Start: 2024-12-01

## 2025-01-09 RX ORDER — ONDANSETRON 4 MG/1
4 TABLET, FILM COATED ORAL EVERY 8 HOURS PRN
Status: DISCONTINUED | OUTPATIENT
Start: 2025-01-09 | End: 2025-01-10

## 2025-01-09 RX ORDER — ROPINIROLE 1 MG/1
1 TABLET, FILM COATED ORAL NIGHTLY
Status: DISCONTINUED | OUTPATIENT
Start: 2025-01-09 | End: 2025-01-11 | Stop reason: HOSPADM

## 2025-01-09 RX ORDER — FLUTICASONE FUROATE AND VILANTEROL 200; 25 UG/1; UG/1
1 POWDER RESPIRATORY (INHALATION)
Status: DISCONTINUED | OUTPATIENT
Start: 2025-01-09 | End: 2025-01-09

## 2025-01-09 RX ORDER — OXYCODONE HYDROCHLORIDE 5 MG/1
2.5 TABLET ORAL EVERY 4 HOURS PRN
Status: DISCONTINUED | OUTPATIENT
Start: 2025-01-09 | End: 2025-01-11 | Stop reason: HOSPADM

## 2025-01-09 RX ORDER — HEPARIN SODIUM 5000 [USP'U]/ML
7500 INJECTION, SOLUTION INTRAVENOUS; SUBCUTANEOUS EVERY 8 HOURS SCHEDULED
Status: DISCONTINUED | OUTPATIENT
Start: 2025-01-09 | End: 2025-01-11 | Stop reason: HOSPADM

## 2025-01-09 RX ORDER — CHOLECALCIFEROL (VITAMIN D3) 25 MCG
5000 TABLET ORAL DAILY
Status: DISCONTINUED | OUTPATIENT
Start: 2025-01-09 | End: 2025-01-11 | Stop reason: HOSPADM

## 2025-01-09 RX ORDER — MAGNESIUM SULFATE 1 G/100ML
1 INJECTION INTRAVENOUS ONCE
Status: CANCELLED | OUTPATIENT
Start: 2025-01-09 | End: 2025-01-09

## 2025-01-09 RX ORDER — INSULIN GLARGINE 100 [IU]/ML
54 INJECTION, SOLUTION SUBCUTANEOUS EVERY 24 HOURS
Status: DISCONTINUED | OUTPATIENT
Start: 2025-01-09 | End: 2025-01-11 | Stop reason: HOSPADM

## 2025-01-09 RX ORDER — HEPARIN SODIUM 5000 [USP'U]/ML
7500 INJECTION, SOLUTION INTRAVENOUS; SUBCUTANEOUS EVERY 8 HOURS SCHEDULED
Status: DISCONTINUED | OUTPATIENT
Start: 2025-01-09 | End: 2025-01-09

## 2025-01-09 RX ORDER — LACOSAMIDE 100 MG/1
200 TABLET ORAL 2 TIMES DAILY
Status: DISCONTINUED | OUTPATIENT
Start: 2025-01-09 | End: 2025-01-09

## 2025-01-09 RX ORDER — IPRATROPIUM BROMIDE AND ALBUTEROL SULFATE 2.5; .5 MG/3ML; MG/3ML
3 SOLUTION RESPIRATORY (INHALATION) 4 TIMES DAILY PRN
Status: DISCONTINUED | OUTPATIENT
Start: 2025-01-09 | End: 2025-01-11 | Stop reason: HOSPADM

## 2025-01-09 RX ORDER — CAFFEINE 200 MG
100 TABLET ORAL ONCE
Status: COMPLETED | OUTPATIENT
Start: 2025-01-09 | End: 2025-01-09

## 2025-01-09 RX ORDER — NALOXONE HYDROCHLORIDE 0.4 MG/ML
0.2 INJECTION, SOLUTION INTRAMUSCULAR; INTRAVENOUS; SUBCUTANEOUS EVERY 5 MIN PRN
Status: DISCONTINUED | OUTPATIENT
Start: 2025-01-09 | End: 2025-01-11 | Stop reason: HOSPADM

## 2025-01-09 RX ADMIN — MAGNESIUM SULFATE HEPTAHYDRATE 1 G: 1 INJECTION, SOLUTION INTRAVENOUS at 10:44

## 2025-01-09 RX ADMIN — INSULIN LISPRO 2 UNITS: 100 INJECTION, SOLUTION INTRAVENOUS; SUBCUTANEOUS at 17:57

## 2025-01-09 RX ADMIN — FOLIC ACID 1 MG: 1 TABLET ORAL at 08:44

## 2025-01-09 RX ADMIN — ROPINIROLE HYDROCHLORIDE 1 MG: 1 TABLET, FILM COATED ORAL at 02:54

## 2025-01-09 RX ADMIN — INSULIN GLARGINE 54 UNITS: 100 INJECTION, SOLUTION SUBCUTANEOUS at 10:44

## 2025-01-09 RX ADMIN — PRUCALOPRIDE 2 MG: 2 TABLET, FILM COATED ORAL at 15:05

## 2025-01-09 RX ADMIN — LACOSAMIDE 50 MG: 50 TABLET, FILM COATED ORAL at 08:42

## 2025-01-09 RX ADMIN — SODIUM CHLORIDE 1000 ML: 9 INJECTION, SOLUTION INTRAVENOUS at 08:36

## 2025-01-09 RX ADMIN — AZELASTINE HYDROCHLORIDE 1 SPRAY: 137 SPRAY, METERED NASAL at 08:51

## 2025-01-09 RX ADMIN — AMITRIPTYLINE HYDROCHLORIDE 100 MG: 100 TABLET ORAL at 20:38

## 2025-01-09 RX ADMIN — HYDROCORTISONE 10 MG: 10 TABLET ORAL at 08:44

## 2025-01-09 RX ADMIN — POLYETHYLENE GLYCOL 3350 17 G: 17 POWDER, FOR SOLUTION ORAL at 08:40

## 2025-01-09 RX ADMIN — DIVALPROEX SODIUM 500 MG: 500 TABLET, FILM COATED, EXTENDED RELEASE ORAL at 08:43

## 2025-01-09 RX ADMIN — HEPARIN SODIUM 7500 UNITS: 5000 INJECTION INTRAVENOUS; SUBCUTANEOUS at 01:50

## 2025-01-09 RX ADMIN — MONTELUKAST 10 MG: 10 TABLET, FILM COATED ORAL at 01:50

## 2025-01-09 RX ADMIN — INSULIN LISPRO 2 UNITS: 100 INJECTION, SOLUTION INTRAVENOUS; SUBCUTANEOUS at 13:02

## 2025-01-09 RX ADMIN — EZETIMIBE 10 MG: 10 TABLET ORAL at 08:45

## 2025-01-09 RX ADMIN — DIVALPROEX SODIUM 500 MG: 500 TABLET, FILM COATED, EXTENDED RELEASE ORAL at 20:38

## 2025-01-09 RX ADMIN — PANTOPRAZOLE SODIUM 40 MG: 40 TABLET, DELAYED RELEASE ORAL at 17:56

## 2025-01-09 RX ADMIN — ONDANSETRON HYDROCHLORIDE 4 MG: 4 TABLET, FILM COATED ORAL at 06:51

## 2025-01-09 RX ADMIN — MONTELUKAST 10 MG: 10 TABLET, FILM COATED ORAL at 20:37

## 2025-01-09 RX ADMIN — HEPARIN SODIUM 7500 UNITS: 5000 INJECTION INTRAVENOUS; SUBCUTANEOUS at 21:36

## 2025-01-09 RX ADMIN — FUROSEMIDE 20 MG: 20 TABLET ORAL at 20:37

## 2025-01-09 RX ADMIN — CLONAZEPAM 0.5 MG: 0.5 TABLET ORAL at 08:44

## 2025-01-09 RX ADMIN — AZELASTINE HYDROCHLORIDE 1 SPRAY: 137 SPRAY, METERED NASAL at 20:38

## 2025-01-09 RX ADMIN — FLUTICASONE PROPIONATE AND SALMETEROL XINAFOATE 2 PUFF: 230; 21 AEROSOL, METERED RESPIRATORY (INHALATION) at 13:04

## 2025-01-09 RX ADMIN — ROPINIROLE HYDROCHLORIDE 1 MG: 1 TABLET, FILM COATED ORAL at 20:37

## 2025-01-09 RX ADMIN — CLONAZEPAM 0.5 MG: 0.5 TABLET ORAL at 20:41

## 2025-01-09 RX ADMIN — HEPARIN SODIUM 7500 UNITS: 5000 INJECTION INTRAVENOUS; SUBCUTANEOUS at 15:15

## 2025-01-09 RX ADMIN — OXYCODONE 5 MG: 5 TABLET ORAL at 21:41

## 2025-01-09 RX ADMIN — LEVOTHYROXINE SODIUM 75 MCG: 75 TABLET ORAL at 06:19

## 2025-01-09 RX ADMIN — CAFFEINE 100 MG: 200 TABLET ORAL at 08:41

## 2025-01-09 RX ADMIN — Medication 5000 UNITS: at 08:45

## 2025-01-09 RX ADMIN — LEVETIRACETAM 1500 MG: 750 TABLET, FILM COATED ORAL at 08:42

## 2025-01-09 RX ADMIN — HYDROCORTISONE 10 MG: 10 TABLET ORAL at 20:37

## 2025-01-09 RX ADMIN — PROPRANOLOL HYDROCHLORIDE 60 MG: 60 CAPSULE, EXTENDED RELEASE ORAL at 08:41

## 2025-01-09 RX ADMIN — FLUTICASONE PROPIONATE AND SALMETEROL XINAFOATE 2 PUFF: 230; 21 AEROSOL, METERED RESPIRATORY (INHALATION) at 19:26

## 2025-01-09 RX ADMIN — FUROSEMIDE 20 MG: 20 TABLET ORAL at 08:44

## 2025-01-09 RX ADMIN — ACETAMINOPHEN 650 MG: 325 TABLET ORAL at 06:23

## 2025-01-09 RX ADMIN — TIZANIDINE 2 MG: 4 TABLET ORAL at 21:41

## 2025-01-09 RX ADMIN — LACOSAMIDE 200 MG: 100 TABLET, FILM COATED ORAL at 08:41

## 2025-01-09 RX ADMIN — FLUTICASONE PROPIONATE 1 SPRAY: 50 SPRAY, METERED NASAL at 06:20

## 2025-01-09 RX ADMIN — LEVETIRACETAM 1500 MG: 750 TABLET, FILM COATED ORAL at 20:37

## 2025-01-09 RX ADMIN — PANTOPRAZOLE SODIUM 40 MG: 40 TABLET, DELAYED RELEASE ORAL at 06:16

## 2025-01-09 RX ADMIN — LACOSAMIDE 300 MG: 100 TABLET, FILM COATED ORAL at 20:41

## 2025-01-09 RX ADMIN — PROMETHAZINE HYDROCHLORIDE 25 MG: 25 INJECTION INTRAMUSCULAR; INTRAVENOUS at 15:04

## 2025-01-09 SDOH — ECONOMIC STABILITY: INCOME INSECURITY: IN THE PAST 12 MONTHS HAS THE ELECTRIC, GAS, OIL, OR WATER COMPANY THREATENED TO SHUT OFF SERVICES IN YOUR HOME?: NO

## 2025-01-09 SDOH — SOCIAL STABILITY: SOCIAL INSECURITY: HAS ANYONE EVER THREATENED TO HURT YOUR FAMILY OR YOUR PETS?: NO

## 2025-01-09 SDOH — SOCIAL STABILITY: SOCIAL INSECURITY: WITHIN THE LAST YEAR, HAVE YOU BEEN HUMILIATED OR EMOTIONALLY ABUSED IN OTHER WAYS BY YOUR PARTNER OR EX-PARTNER?: NO

## 2025-01-09 SDOH — ECONOMIC STABILITY: FOOD INSECURITY: WITHIN THE PAST 12 MONTHS, YOU WORRIED THAT YOUR FOOD WOULD RUN OUT BEFORE YOU GOT THE MONEY TO BUY MORE.: NEVER TRUE

## 2025-01-09 SDOH — SOCIAL STABILITY: SOCIAL INSECURITY: DO YOU FEEL ANYONE HAS EXPLOITED OR TAKEN ADVANTAGE OF YOU FINANCIALLY OR OF YOUR PERSONAL PROPERTY?: NO

## 2025-01-09 SDOH — SOCIAL STABILITY: SOCIAL INSECURITY: HAVE YOU HAD THOUGHTS OF HARMING ANYONE ELSE?: NO

## 2025-01-09 SDOH — ECONOMIC STABILITY: HOUSING INSECURITY: IN THE LAST 12 MONTHS, WAS THERE A TIME WHEN YOU WERE NOT ABLE TO PAY THE MORTGAGE OR RENT ON TIME?: NO

## 2025-01-09 SDOH — ECONOMIC STABILITY: FOOD INSECURITY: WITHIN THE PAST 12 MONTHS, THE FOOD YOU BOUGHT JUST DIDN'T LAST AND YOU DIDN'T HAVE MONEY TO GET MORE.: NEVER TRUE

## 2025-01-09 SDOH — SOCIAL STABILITY: SOCIAL INSECURITY: ARE THERE ANY APPARENT SIGNS OF INJURIES/BEHAVIORS THAT COULD BE RELATED TO ABUSE/NEGLECT?: NO

## 2025-01-09 SDOH — SOCIAL STABILITY: SOCIAL INSECURITY
WITHIN THE LAST YEAR, HAVE YOU BEEN RAPED OR FORCED TO HAVE ANY KIND OF SEXUAL ACTIVITY BY YOUR PARTNER OR EX-PARTNER?: NO

## 2025-01-09 SDOH — SOCIAL STABILITY: SOCIAL INSECURITY: DO YOU FEEL UNSAFE GOING BACK TO THE PLACE WHERE YOU ARE LIVING?: NO

## 2025-01-09 SDOH — ECONOMIC STABILITY: HOUSING INSECURITY: AT ANY TIME IN THE PAST 12 MONTHS, WERE YOU HOMELESS OR LIVING IN A SHELTER (INCLUDING NOW)?: NO

## 2025-01-09 SDOH — SOCIAL STABILITY: SOCIAL INSECURITY: ABUSE: ADULT

## 2025-01-09 SDOH — SOCIAL STABILITY: SOCIAL INSECURITY: WITHIN THE LAST YEAR, HAVE YOU BEEN AFRAID OF YOUR PARTNER OR EX-PARTNER?: NO

## 2025-01-09 SDOH — ECONOMIC STABILITY: HOUSING INSECURITY: IN THE PAST 12 MONTHS, HOW MANY TIMES HAVE YOU MOVED WHERE YOU WERE LIVING?: 1

## 2025-01-09 SDOH — ECONOMIC STABILITY: TRANSPORTATION INSECURITY: IN THE PAST 12 MONTHS, HAS LACK OF TRANSPORTATION KEPT YOU FROM MEDICAL APPOINTMENTS OR FROM GETTING MEDICATIONS?: NO

## 2025-01-09 SDOH — ECONOMIC STABILITY: FOOD INSECURITY: HOW HARD IS IT FOR YOU TO PAY FOR THE VERY BASICS LIKE FOOD, HOUSING, MEDICAL CARE, AND HEATING?: NOT HARD AT ALL

## 2025-01-09 SDOH — SOCIAL STABILITY: SOCIAL INSECURITY: ARE YOU OR HAVE YOU BEEN THREATENED OR ABUSED PHYSICALLY, EMOTIONALLY, OR SEXUALLY BY ANYONE?: NO

## 2025-01-09 SDOH — SOCIAL STABILITY: SOCIAL INSECURITY: WERE YOU ABLE TO COMPLETE ALL THE BEHAVIORAL HEALTH SCREENINGS?: YES

## 2025-01-09 SDOH — SOCIAL STABILITY: SOCIAL INSECURITY: DOES ANYONE TRY TO KEEP YOU FROM HAVING/CONTACTING OTHER FRIENDS OR DOING THINGS OUTSIDE YOUR HOME?: NO

## 2025-01-09 ASSESSMENT — PAIN SCALES - GENERAL
PAINLEVEL_OUTOF10: 8
PAINLEVEL_OUTOF10: 3
PAINLEVEL_OUTOF10: 8

## 2025-01-09 ASSESSMENT — ACTIVITIES OF DAILY LIVING (ADL)
BATHING: INDEPENDENT
PATIENT'S MEMORY ADEQUATE TO SAFELY COMPLETE DAILY ACTIVITIES?: YES
JUDGMENT_ADEQUATE_SAFELY_COMPLETE_DAILY_ACTIVITIES: YES
TOILETING: INDEPENDENT
WALKS IN HOME: NEEDS ASSISTANCE
HEARING - LEFT EAR: FUNCTIONAL
HEARING - RIGHT EAR: FUNCTIONAL
DRESSING YOURSELF: INDEPENDENT
LACK_OF_TRANSPORTATION: NO
FEEDING YOURSELF: INDEPENDENT
GROOMING: INDEPENDENT
ADEQUATE_TO_COMPLETE_ADL: YES
LACK_OF_TRANSPORTATION: NO

## 2025-01-09 ASSESSMENT — COGNITIVE AND FUNCTIONAL STATUS - GENERAL
DAILY ACTIVITIY SCORE: 24
MOBILITY SCORE: 24
DAILY ACTIVITIY SCORE: 24
PATIENT BASELINE BEDBOUND: NO
MOBILITY SCORE: 24

## 2025-01-09 ASSESSMENT — LIFESTYLE VARIABLES
AUDIT-C TOTAL SCORE: 0
HOW OFTEN DO YOU HAVE A DRINK CONTAINING ALCOHOL: NEVER
HOW MANY STANDARD DRINKS CONTAINING ALCOHOL DO YOU HAVE ON A TYPICAL DAY: PATIENT DOES NOT DRINK
HOW OFTEN DO YOU HAVE 6 OR MORE DRINKS ON ONE OCCASION: NEVER
AUDIT-C TOTAL SCORE: 0
SKIP TO QUESTIONS 9-10: 1

## 2025-01-09 ASSESSMENT — PAIN DESCRIPTION - LOCATION
LOCATION: HEAD
LOCATION: HEAD

## 2025-01-09 ASSESSMENT — PATIENT HEALTH QUESTIONNAIRE - PHQ9
1. LITTLE INTEREST OR PLEASURE IN DOING THINGS: NOT AT ALL
SUM OF ALL RESPONSES TO PHQ9 QUESTIONS 1 & 2: 0
2. FEELING DOWN, DEPRESSED OR HOPELESS: NOT AT ALL

## 2025-01-09 ASSESSMENT — PAIN - FUNCTIONAL ASSESSMENT: PAIN_FUNCTIONAL_ASSESSMENT: 0-10

## 2025-01-09 NOTE — PROGRESS NOTES
Pharmacy Medication History Review    Jonathan Ayala is a 49 y.o. female admitted for  (ventriculoperitoneal) shunt status. Pharmacy reviewed the patient's smwot-vj-rijhydcak medications and allergies for accuracy.    The list below reflects the updated PTA list.   Prior to Admission Medications   Prescriptions Last Dose Informant   Advair -21 mcg/actuation inhaler  Self   Sig: INHALE TWO PUFFS BY MOUTH AS INSTRUCTED TWO TIMES A DAY.   Dexcom G7  misc  Self   Sig: Use as instructed to check blood sugars continuously throughout the day   EPINEPHrine 0.3 mg/0.3 mL injection syringe  Self   Sig: INJECT INTRAMUSCULARLY ONCE AS NEEDED FOR ANAPHYLAXIS   Motegrity 2 mg tablet  Self   Sig: Take 1 tablet (2 mg) by mouth once daily.   OneTouch Delica Plus Lancet 33 gauge misc  Self   Sig: USE 1 LANCET TO CHECK GLUCOSE ONCE DAILY AS DIRECTED   OneTouch Verio test strips strip  Self   Sig: USE 1 STRIP TO CHECK GLUCOSE ONCE DAILY AS DIRECTED   ZINC ORAL  Self   Sig: Take 50 mg by mouth once daily.   acetaminophen (Tylenol) 325 mg tablet  Self   Sig: Take 1-2 tablets (325-650 mg) by mouth every 6 hours if needed for mild pain (1 - 3). Days of infusions   amitriptyline (Elavil) 100 mg tablet  Self   Sig: Take 1 tablet (100 mg) by mouth once daily at bedtime.   azelastine (Astelin) 137 mcg (0.1 %) nasal spray  Self   Sig: Administer 1 spray into each nostril 2 times a day. Use in each nostril as directed   blood-glucose sensor (DEXCOM G7 SENSOR MISC)  Self   Sig: Use to check blood sugar continuously throughout the day as directed. Change sensor every 10 days.   calcium-vitamin D3-vitamin K (Viactiv) 650 mg-12.5 mcg-40 mcg chewable tablet  Self   Sig: Chew 2 tablets once daily. At lunch   carboxymethylcellulose (Refresh Celluvisc) 1 % ophthalmic solution dropperette  Self   Sig: Administer 2 drops into both eyes 3 times a day as needed for dry eyes.   cholecalciferol (Vitamin D-3) 5,000 Units tablet  Self   Sig:  "Take 1 tablet (5,000 Units) by mouth once daily.   clonazePAM (KlonoPIN) 0.5 mg tablet  Self   Sig: Take 1 tablet (0.5 mg) by mouth 2 times a day.   diclofenac (Voltaren) 50 mg EC tablet  Self   Sig: Take 1 tablet (50 mg) by mouth 3 times a day as needed (migraines).   dicyclomine (Bentyl) 20 mg tablet  Self   Sig: Take 1 tablet (20 mg) by mouth 4 times a day as needed (spasm pain).  \"Not helping\" per patient    divalproex (Depakote ER) 500 mg 24 hr tablet  Self   Sig: Take 1 tablet (500 mg) by mouth 2 times a day.   ezetimibe (Zetia) 10 mg tablet  Self   Sig: Take 1 tablet (10 mg) by mouth once daily.   fluticasone (Flonase) 50 mcg/actuation nasal spray  Self   Sig: USE 1 SPRAY INTO EACH NOSTRIL ONCE DAILY.   folic acid (Folvite) 1 mg tablet  Self   Sig: Take 1 tablet (1 mg) by mouth once daily.   furosemide (Lasix) 20 mg tablet  Self   Sig: Take 1 tablet (20 mg) by mouth 2 times a day.   gloves, latex with aloe vera misc  Self   Sig: Large size gloves   glucagon (Baqsimi) 3 mg/actuation spray,non-aerosol  Self   Sig: USE ONE SPRAY IN THE NOSE AS NEEDED FOR LOW BLOOD SUGAR  MAY REPEAT AFTER 15 MINUTES USING A NEW DEVICE IF NO RESPONSE   glucagon (Glucagen) 1 mg injection  Self   Sig: Inject 1 mg under the skin 1 time if needed for low blood sugar - see comments (hypoglycemia).   hydrocortisone (Cortef) 10 mg tablet  Self   Sig: Take 1 tablet (10 mg) by mouth 2 times a day. Double the dose if sick for three days   immune globulin, human, (Gammagard) infusion 1/6/2025 Self   Sig: Infuse 600 mL (60 g) into a venous catheter every 14 (fourteen) days.   insulin glargine (Toujeo Max Solostar- 2 unit dial) 300 unit/mL (3 mL) injection  Self   Sig: Inject 54 units under the skin once daily   insulin lispro (HumaLOG KwikPen Insulin) 100 unit/mL injection  Self   Sig: Use as directed to give up to 100 units a day   ipratropium-albuteroL (Duo-Neb) 0.5-2.5 mg/3 mL nebulizer solution  Self   Sig: Inhale 3 mL 4 times a day as " "needed.   lacosamide (Vimpat) 200 mg tablet tablet  Self   Sig: Take 1 tablet (200 mg) by mouth 2 times a day.   lacosamide (Vimpat) 50 mg tablet  Self   Sig: Take 1 tablet (50 mg) by mouth every 12 hours.  Total dose 250 mg twice daily    lasmiditan 100 mg tablet  Self   Sig: Take 100 mg by mouth if needed (migraine). Do not drive in 8 hours of taking this medicine. Maximum one dose per 24 hours.   levETIRAcetam (Keppra) 750 mg tablet  Self   Sig: Take 2 tablets (1,500 mg) by mouth 2 times a day.   levocetirizine (Xyzal) 5 mg tablet  Self   Sig: Take 1 tablet (5 mg) by mouth early in the morning..   levothyroxine (Synthroid, Levoxyl) 50 mcg tablet  Self   Sig: Take 1.5 tablets (75 mcg) by mouth once daily in the morning. Take before meals.   miconazole (Micotin) 2 % cream  Self   Sig: Apply 1 Application topically once daily as needed (rash).   miconazole (Micotin) 2 % powder  Self   Sig: Apply to groin , under the breast and skin folds daily   miscellaneous medical supply misc  Self   Sig: Bath Wipes  fragrance free   moisturizing mouth (Biotene Oral Dry Mouth) solution  Self   Sig: Take 1 spray by mouth 4 times a day as needed.   montelukast (Singulair) 10 mg tablet  Self   Sig: TAKE ONE TABLET BY MOUTH EVERY DAY AT BEDTIME   nasal spray Nayzilam 5 mg/spray (0.1 mL) spray,non-aerosol  Self   Sig: Administer into affected nostril(s) 1 time.   ondansetron ODT (Zofran-ODT) 4 mg disintegrating tablet  Self   Sig: Take 1 tablet (4 mg) by mouth every 8 hours if needed for nausea or vomiting.   pantoprazole (Protonix) 40 mg EC tablet  Self   Sig: Take 1 tablet (40 mg) by mouth 2 times a day before meals.   pen needle, diabetic (BD Lela 2nd Gen Pen Needle) 32 gauge x 5/32\" needle  Self   Sig: Use as directed with insulin pen up to 5 times daily   polyethylene glycol (Glycolax, Miralax) 17 gram/dose powder  Self   Sig: Mix 17 g of powder and drink 3 times a day as needed for constipation.  Using 1-3 times daily  "   propranolol LA (Inderal LA) 60 mg 24 hr capsule  Self   Sig: Take 1 capsule (60 mg) by mouth once daily. Do not crush, chew, or split.   rOPINIRole (Requip) 0.5 mg tablet  Self   Sig: Take 1 tablet by mouth (0.5mg) in the morning and 2 tablets (1mg) by mouth in the evening   senna 8.6 mg tablet  Self   Sig: Take 1-2 tablets (8.6-17.2 mg) by mouth as needed at bedtime for constipation.   tiZANidine (Zanaflex) 2 mg tablet  Self   Sig: Take 1 tablet (2 mg) by mouth every 8 hours if needed for muscle spasms.   ubrogepant (Ubrelvy) 100 mg tablet tablet  Self   Sig: Take 1 tablet (100 mg) by mouth if needed (migraine). May repeat in 2 hours for max of 200mg per 24 hours.      Facility-Administered Medications: None        The list below reflects the updated allergy list. Please review each documented allergy for additional clarification and justification.  Allergies  Reviewed by Sy Zarate RN on 1/8/2025        Severity Reactions Comments    Acetazolamide High Hives, Rash, Shortness of breath, Swelling oral hives, redness, ABD pain    Atorvastatin High Unknown Causes muscle pain    Cefdinir High Itching, Rash, Shortness of breath, Anaphylaxis Itchy throat Throat closing / difficulty breathing.  Took Benadryl at home. Itchy throat      Throat closing / difficulty breathing.  Took Benadryl at home.    Ceftriaxone High Anaphylaxis, Rash, Shortness of breath, Swelling SOB SOB hives turned red during gallbladder    Doxepin High Anaphylaxis, Hives, Swelling, Angioedema, Rash Itchy sore throat problems with swallowing facial swelling Itchy sore throat problems with swallowing      facial swelling    Duloxetine High Anaphylaxis, Hallucinations, Rash suicidal ideation suicidal ideation  Other reaction(s): suicidal ideation suicidal Suicidal ideation    Fd And C Red No.40 High Anaphylaxis     Levofloxacin High Angioedema, Swelling, Rash eyelid swelling eyelid swelling. Patient tolerates Avelox    Levofloxacin In D5w High  "Anaphylaxis [place] face lips swelling just Levaquin tolerated D5W)    Nutritional Supplements High Anaphylaxis Grapes ( red dye    Ozempic [semaglutide] High Nausea/vomiting Severe gastroparesis worsening     Prochlorperazine High Anaphylaxis HIGH Compazine involuntary muscle spasms low doses tolerated)    Red Dye High Anaphylaxis     Becky High Anaphylaxis, Swelling Rosemary SOB facial swelling    Becky Oil High Anaphylaxis, Itching, Swelling Rosemary  SOB facial swelling      SOB facial swelling    Strawberry High Shortness of breath White blisters in mouth      Other reaction(s): white blisters in mouth, shortness of breath    Sulfa (sulfonamide Antibiotics) High Anaphylaxis, Rash, Shortness of breath, Swelling SOB itchy hives Other Reaction(s): rash, SOB      SOB itchy hives    Topiramate High Anaphylaxis, Swelling, Angioedema eyelid swelling Throat swelling eyelid swelling  Throat swelling      Throat swelling      eyelid swelling    Tree Pollen-black Struthers High Shortness of breath Other Reaction(s): Other: See Comments      White blisters in mouth      blisters in mouth-carries an EPIPEN-\"I have gotten short of breath\"    Tree Pollen-pecan High Shortness of breath pecans    Struthers High Shortness of breath     Aripiprazole Medium Unknown, Fever, Other fever and tremors Fevers and Tremors Tremor    Aspartame Medium Diarrhea Blood sugar [place], Diarrhea    Aspartame (bulk) Medium Diarrhea, Nausea Only, Nausea/vomiting Other Reaction(s): nausea, diarrhea, abdominal pain      Blood sugar [place], Diarrhea    Fenofibrate Medium Other Double vision    Gluten Medium Itching, Other, Rash Rashes constipation gastric discomfort    Hydrochlorothiazide Medium Hives, Itching, Rash hctz removed as free-text allergy and entered as [facility] allergy,1455 10/6/2007JO      itchy hives    Hydromorphone Medium Unknown, GI intolerance, Nausea Only Intolerance nausea instant    Iron Medium Hives, Rash ichy hive ( can tolerate ferrous " gluconate)    Meclizine Medium Angioedema, Other, Swelling eyelid swelling Swelling of the eyelids Eyelids and face    Metformin Medium Other Other reaction(s): Dyspepsia, Dyspepsia    Propoxyphene Medium Unknown, Hives Darvocet itchy hives    Statins-hmg-coa Reductase Inhibitors Medium Unknown Double vision    Tetracyclines Medium Hives, Itching, Rash sulfa Rash itching Itchy  rash    Thiazides Medium Hives, Itching, Rash itchy hives    Venlafaxine Medium Unknown, Fever Fevers chills    Wheat Medium Unknown oral blisters    Adhesive Tape-silicones Not Specified Itching, Other     Barbiturates Not Specified Unknown     Ciprofloxacin Not Specified Hives     Dhe Not Specified Unknown Raynaud's disease    Diet Foods Not Specified Diarrhea Aspartame allergy only. Removes to many foods to interface.    Farxiga [dapagliflozin] Not Specified Other Frequent yeast infections and UTI    Ferrous Sulfate Not Specified Hives     Keflex [cephalexin] Not Specified Itching     L.acidoph-l.bulg-b.bif-s.therm Not Specified GI intolerance     Nsaids (non-steroidal Anti-inflammatory Drug) Not Specified Unknown, Nausea/vomiting     Other Not Specified Unknown     Sulfonylureas Not Specified Unknown     Tobramycin Not Specified Unknown     Vancomycin Not Specified Unknown, Hives Niyah syndrome Other reaction(s): Other Red man syndrome if given fast, benadryl with.  Niyah syndrome  Other reaction(s): Other      Other reaction(s): Other      Red man syndrome if given fast, benadryl with.    Adhesive Low Rash     Azithromycin Low Hives, Itching, Rash     Betamethasone Low Nausea And Vomiting, Other, GI intolerance, Diarrhea N/V/D    House Dust Low Rash     Metoclopramide Hcl Low Other     Prednisone Low Rash     Propoxyphene-acetaminophen Low Hives, Rash     Sulfacetamide Sodium Low Rash, Swelling     [id number] Antimigraine Agents Low Nausea Only None due to Raynaud's  ( Triptans cause a stroke)    Zolpidem Low Other, Hallucinations  Sleep walk Other Reaction(s): insomnia and agitation      Sleep walk            Patient accepts M2B at discharge. Pharmacy has been updated to [facility].    Sources used to complete the med history include:   Pharmacy dispense history - [facility]  Patient interview Good historian  Chart Review  [facility]        Medications ADDED:  Diclofenac  Levocetirizine   Medications CHANGED:  Miralax  Nayzilam   Medications REMOVED:   None     Davida Hansen PharmD  Transitions of Care Pharmacist  [facility]  Please reach out via Secure Chat for questions, or if no response call [employer] or [facility]

## 2025-01-09 NOTE — CONSULTS
Inpatient consult to Neurology  Consult performed by: Addison Quezada MD  Consult ordered by: Raquel Pro PA-C          History Of Present Illness  Jonathan Ayala is a 49 y.o. 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    He was taken from the patient and the  who witnessed the event:  Patient reported having a severe headache at 7 PM on 1/7/2025 that was 10 out of 10 thunderclap-like, holocephalic, squeezing-like in character, associated with double vision and emesis.  She tried her migraine medications (promethazine, diclofenac, ubervely) which did not help.  She then started having shaking of her left arm and leg patient with head deviation to the right side.  It lasted for 1 minute with complete loss of awareness.  And then she started having another event that lasted for around 4 minutes.  Per the  her eyes were closed during these events.  She then had an episode of vomiting and she slept till the next day.  She then contacted her ophthalmologist and neurosurgeon who recommended visiting the ED for further evaluation.    Patient denies any recent fever, upper respiratory tract infection, cough chest pain, burning sensation while urinating.  She reports having good compliance to her medications she.  She takes Keppra 1500 twice daily and lacosamide 250 mg twice daily.  She never missed any doses.      In the ED the patient was seen by ophthalmology and neurosurgery team.  Her CBC showed white blood cell of 4.5 hemoglobin of 11.5 platelets of 279.  Glucose of 177.  Keppra level of 42.  ESR 52 with normal CRP of 0.54.  CT head showed no acute intracranial  abnormality.  Similar positioning of right frontal  shunt.  Patient was assessed by ophthalmology team and given the symptoms including nausea vomiting, it could still be related to high ICP even in the absence of disc edema.  Neurosurgery evaluated the patient and recommended LP for opening pressure as shunt failure might be the etiology here.    The patient was recently discharged on 9/25/2024 after having optic disc edema and optic neuritis.  Her lumbar puncture was notable for elevated protein and opening pressure of 52, no pleocytosis.  Headache improved after LP and vision continues to improve.  She was then diagnosed with IIH and  shunt was placed by neurosurgery on 9/24.  Her hospital admission was complicated by episode of left-sided paresthesia with weakness and headache.  CT scans and MRI were negative for stroke and her headache was treated with IV fluids, dexamethasone, and Zofran.      Epilepsy History:   Patient described her events as head deviation to the left, left hemibody shaking, walking in circles with retained awareness.  She was admitted for video EEG evaluation from 3/14/2023 to 3/20/2023.  Interictal EEG showed no epileptiform discharges. One typical event (1E) was captured which consisted of asymmetric (L>R) jerking of   the shoulder/torso and body shaking consistent with a paroxysmal non epileptic event. EEG showed no changes. 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 She was provided with resources for CBT . In February 2022 she had left arm clonic and left head versive seizure captured from the right parietal lobe.          Classification of the Paroxysmal Episodes:   Epileptic  Episodes semiology: Left arm clonic -> left head version.   Frequency: unknown   Non-epileptic psychogenic:   Episodes semiology: Generalized clonic event (D)   Frequency: Once per month  History of Status Epilepticus: No   Localization: Right parietal   Etiology: Traumatic SAH   Co-morbidities:  Hypothyroidism, BRENT, depression anxiety, Sjogren's syndrome, common variable immunodeficiency, hypertension, diabetes.    EEG (, 11/30/2022):  This awake and sleep EEG is normal. There was one nonepileptic event without EEG correlate. There were no epileptiform discharges or lateralizing signs seen.    VEEG (, 5/12/2022-5/15/2022):  This is a normal awake and sleep vEEG. There are no lateralizing signs, epileptiform discharges or seizures recorded.     VEEG (, 2/14/2022-2/18/2022):  This EEG is indicative of a right hemispheric structural lesion. On 2/15/2022, the EEG was indicative of a highly active right parietal epileptogenic structural lesion. Multiple seizures were captured from the right parietal lobe with left arm clonic and left head version. No electroclinical seizures were noted the rest of the recording.     MRI brain wo/w contrast (, 11/11/2022):  1. Changes of prior right frontal approach bolt placement are again demonstrated, with small amount of hemosiderin staining present within the sulci of the right frontal lobe, and small amount of FLAIR hyperintense gliosis present in the adjacent white matter  2. Otherwise, no acute intracranial abnormality is evident. No evidence of new infarct or acute hemorrhage. No area of cystic encephalomalacia suggestive of previous infarcts are identifiedmpression:       Past Medical History  Past Medical History:   Diagnosis Date    Abnormal findings on diagnostic imaging of other abdominal regions, including retroperitoneum 10/14/2020    Abnormal CT of the abdomen    Acquired deformity of nose 03/24/2022    Nasal deformity    Acute upper respiratory infection, unspecified 10/16/2019    Acute URI    Adrenal disease (Multi)     Allergic     Allergy status to unspecified drugs, medicaments and biological substances 05/22/2020    History of drug allergy    Allergy status to unspecified drugs, medicaments and biological substances 11/13/2020    History of adverse  drug reaction    Anemia     Anxiety 2005    Asthma     Benign intracranial hypertension 01/27/2022    Pseudotumor cerebri    Bipolar disorder, unspecified (Multi)     Bipolar disease, chronic    Breast calcification, right 08/21/2018    Cellulitis of abdominal wall 09/28/2022    Cellulitis of right abdominal wall    Cervicalgia 07/01/2020    Cervicalgia of rwzapkuu-yapykek-zbkti region    Chronic maxillary sinusitis 01/04/2022    Chronic maxillary sinusitis    Chronic sialoadenitis 03/16/2020    Chronic sialoadenitis    COVID-19 01/06/2022    COVID-19 with multiple comorbidities    Decreased white blood cell count, unspecified 11/04/2019    Leukopenia    Disease of thyroid gland     Disturbances of salivary secretion 03/16/2020    Xerostomia    Dry eye syndrome of bilateral lacrimal glands 10/07/2022    Dry eyes, bilateral    Encounter for preprocedural cardiovascular examination 02/01/2022    Preoperative cardiovascular examination    Food additives allergy status 06/11/2020    Allergy to food dye    Fracture of nasal bones, initial encounter for closed fracture 03/03/2022    Closed fracture of nasal bone, initial encounter    GERD (gastroesophageal reflux disease) 15 years old    Granuloma of right orbit 10/07/2021    Inflammatory pseudotumor of right orbit    History of endometrial ablation 11/09/2017    Hyperlipidemia     Hypertension     Hyperthyroidism     Hypoglycemia     Hypothyroidism     Immunocompromised     Localized swelling, mass and lump, head 03/24/2022    Swollen nose    Major depressive disorder, recurrent, in full remission (CMS-HCC) 10/07/2021    Depression, major, recurrent, in complete remission    Mammary duct ectasia of left breast 08/24/2022    Periductal mastitis of left breast    Meningitis ([facility]) 2008    Migraine     Nipple discharge 08/24/2022    Bloody discharge from left nipple    Ocular pain, right eye 10/07/2022    Pain in right eye    Optic atrophy     Other abnormal and  inconclusive findings on diagnostic imaging of breast 07/06/2020    Other abnormal and inconclusive findings on diagnostic imaging of breast    Other anomalies of pupillary function 05/31/2019    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    Other chest pain 05/18/2020    Chest discomfort    Other conditions influencing health status 08/01/2022    History of cough    Other conditions influencing health status 08/03/2021    Chronic migraine    Other specified disorders of eustachian tube, left ear 11/18/2019    ETD (Eustachian tube dysfunction), left    Other specified disorders of nose and nasal sinuses 03/24/2022    Nasal dryness    Other specified disorders of nose and nasal sinuses 03/24/2022    Nasal crusting    Pelvic and perineal pain 07/06/2020    Pelvic pain    Personal history of other diseases of the circulatory system 04/07/2020    History of sinus tachycardia    Personal history of other diseases of the circulatory system 04/07/2020    History of abnormal electrocardiography    Personal history of other diseases of the circulatory system     History of Raynaud's syndrome    Personal history of other diseases of the digestive system     History of irritable bowel syndrome    Personal history of other diseases of the digestive system 03/02/2020    History of oral pain    Personal history of other diseases of the musculoskeletal system and connective tissue 01/19/2022    History of neck pain    Personal history of other diseases of the musculoskeletal system and connective tissue 03/02/2021    History of scleroderma    Personal history of other diseases of the musculoskeletal system and connective tissue 06/16/2020    History of muscle weakness    Personal history of other diseases of the nervous system and sense organs 11/18/2019    History of hearing loss    Personal history of other diseases of the nervous system and sense organs 09/21/2022    History of partial seizures    Personal history of other  diseases of the respiratory system 04/14/2021    History of asthma    Personal history of other endocrine, nutritional and metabolic disease 02/17/2021    History of diabetes mellitus    Personal history of other mental and behavioral disorders 05/27/2021    History of anxiety    Personal history of other specified conditions 09/07/2022    History of nipple discharge    Personal history of other specified conditions 10/16/2019    History of headache    Personal history of other specified conditions 09/28/2022    History of lump of left breast    Personal history of other specified conditions 09/16/2021    History of persistent cough    Personal history of other specified conditions 02/01/2022    History of palpitations    Personal history of other specified conditions 03/09/2022    History of headache    Personal history of other specified conditions 02/12/2014    History of chest pain    Personal history of other specified conditions 10/27/2021    History of nausea and vomiting    Personal history of other specified conditions 10/16/2019    History of fatigue    Personal history of other specified conditions 02/26/2021    History of orthopnea    Personal history of other specified conditions 02/22/2021    History of shortness of breath    Personal history of urinary calculi     H/O renal calculi    Polycystic ovary syndrome     Postural orthostatic tachycardia syndrome (POTS)     POTS (postural orthostatic tachycardia syndrome)    Rash and other nonspecific skin eruption 03/15/2022    Rash    Repeated falls 06/23/2021    Recurrent falls    Right lower quadrant pain 10/14/2020    Abdominal pain, RLQ (right lower quadrant)    Seizures (Multi)     Sjogren syndrome, unspecified (Multi)     History of Sjogren's disease    Sjogren's syndrome     Sleep apnea     Slow transit constipation 07/09/2020    Slow transit constipation    Subarachnoid hemorrhage, traumatic (Multi) 04/19/2023    Thyroid nodule     Traumatic  subarachnoid hemorrhage with loss of consciousness of unspecified duration, subsequent encounter 03/15/2022    Subarachnoid hemorrhage following injury, with loss of consciousness, subsequent encounter    Traumatic subarachnoid hemorrhage without loss of consciousness, subsequent encounter     Subarachnoid hemorrhage following injury, no loss of consciousness, subsequent encounter    Type 2 diabetes mellitus     Unspecified disorder of refraction 10/07/2022    Refractive error    Unspecified optic neuritis 11/06/2020    Right optic neuritis    Unspecified optic neuritis 11/06/2020    Optic neuritis, right    Unspecified visual loss 09/25/2019    Vision loss    Varicella As a child    Venous insufficiency (chronic) (peripheral) 10/18/2021    Chronic venous insufficiency of lower extremity    Viral infection, unspecified 01/11/2022    Nonspecific syndrome suggestive of viral illness    Vitamin D deficiency     Vitamin D deficiency, unspecified 09/28/2022    Vitamin D deficiency     Surgical History  Past Surgical History:   Procedure Laterality Date    APPENDECTOMY  2017    BRAIN SURGERY  [place] placement to measure pressure    CARDIAC CATHETERIZATION      CHOLECYSTECTOMY      ENDOMETRIAL ABLATION      FRACTURE SURGERY  12/2023    HERNIA REPAIR Right 03/01/2024    with mesh    HYSTERECTOMY  2017    MR HEAD ANGIO WO IV CONTRAST  02/08/2021    MR HEAD ANGIO WO IV CONTRAST 2/8/2021 [facility]ICAL [facility]    MR NECK ANGIO WO IV CONTRAST  02/08/2021    MR NECK ANGIO WO IV CONTRAST 2/8/2021 [facility] CLINICAL [facility]GAC[facility]    OTHER SURGICAL HISTORY  08/22/2019    Carpal tunnel surgery    OTHER SURGICAL HISTORY  08/22/2019    Hysterectomy    OTHER SURGICAL HISTORY  08/22/2019    Venous access port placement    OTHER SURGICAL HISTORY  08/22/2019    Cholecystectomy    OTHER SURGICAL HISTORY  08/22/2019    Appendectomy    OTHER SURGICAL HISTORY  08/22/2019    Pyloroplasty    WISDOM TOOTH EXTRACTION  2004     Social History  Social History      Tobacco Use    Smoking status: Never    Smokeless tobacco: Never   Vaping Use    Vaping status: Never Used   Substance Use Topics    Alcohol use: Not Currently     Comment: Socially in College    Drug use: Yes     Types: Benzodiazepines     Allergies  Acetazolamide, Atorvastatin, Cefdinir, Ceftriaxone, Doxepin, Duloxetine, Fd and c red no.40, Levofloxacin, Levofloxacin in d5w, Nutritional supplements, Ozempic [semaglutide], Prochlorperazine, Red dye, Rosemary, Rosemary oil, Strawberry, Sulfa (sulfonamide antibiotics), Topiramate, Tree pollen-black walnut, Tree pollen-pecan, Robertson, Aripiprazole, Aspartame, Aspartame (bulk), Fenofibrate, Gluten, Hydrochlorothiazide, Hydromorphone, Iron, Meclizine, Metformin, Propoxyphene, Statins-hmg-coa reductase inhibitors, Tetracyclines, Thiazides, Venlafaxine, Wheat, Adhesive tape-silicones, Barbiturates, Ciprofloxacin, Dhe, Diet foods, Farxiga [dapagliflozin], Ferrous sulfate, Keflex [cephalexin], L.acidoph-l.bulg-b.bif-s.therm, Nsaids (non-steroidal anti-inflammatory drug), Other, Sulfonylureas, Tobramycin, Vancomycin, Adhesive, Azithromycin, Betamethasone, House dust, Metoclopramide hcl, Prednisone, Propoxyphene-acetaminophen, Sulfacetamide sodium, Yxbhnfkx-0-sa8 antimigraine agents, and Zolpidem  (Not in a hospital admission)      Review of Systems  Neurological Exam  Physical Exam  General Appearance: Not on any distress.  Was sitting on wheelchair after getting back from radiology department.        Mental State: Orientation was normal to time, place and person. Recent and remote memory was intact.  Attention span and concentration were normal.      Ophthalmoscopic: Patient was wearing dark shades due to light sensitivity.  Preferred not to have lights in her eyes.    Cranial Nerves:   CN: Visual fields full to confrontation.   CN 3, 4, 6: Extraocular movements are full.  Catch-up saccadic eye movements on smooth pursuit.  No ptosis.  No nystagmus.  Visual field was  "examined in isolation, decrease vision mainly on the right, inferior temporal and nasal field.  Decreased visual field on the left temporal visual field (patient was not consistent during that examination)  CN 5: Facial sensation intact bilaterally.   CN 7: Normal and symmetric facial strength. Nasolabial folds symmetric.   CN 8: Hearing intact to voice  CN 9: Palate elevates symmetrically.   CN 11: Normal strength of shoulder shrug and neck turning.   CN 12: Tongue midline, with normal bulk and strength; no fasciculations.     Motor: Muscle bulk and tone were normal in both upper and lower extremities. No fasciculations, tremor or other abnormal movements were present. Muscle strength was 5/5 in distal and proximal muscles in both upper and lower extremities. No fasciculations, tremor or other abnormal movements were present.         Strength    R L  Shoulder abduction 5 5  Shoulder adduction 5 5  Elbow extension 5 5  Elbow flexion  5 5     5 5    Hip flexion      5 5  Hip flexion      5 5  Knee flexion       5 5  Knee extension    5 5  DorsiFlex     5      5  PlantarFlex          5       5    Reflexes: Right/ Left:  Biceps 2/2, brachioradialis 2/2, triceps 2/2, patellar 2/2, ankle 2/2  toes downgoing to plantar stimulation. No clonus, frontal release signs or other pathologic reflexes present.     Sensory: In both upper and lower extremities, sensation was intact to light touch    Coordination: In both upper extremities, finger-nose-finger was intact without dysmetria or overshoot. In both lower extremities, heel-to-shin was intact. DEENA were intact in both upper and lower extremities.      Gait: Deferred for patient's safety         Last Recorded Vitals  Blood pressure 148/72, pulse 98, temperature 36.5 °C (97.7 °F), temperature source Temporal, resp. rate 16, height 1.575 m (5' 2\"), weight 115 kg (253 lb), SpO2 98%.    Relevant Results  Results for orders placed or performed during the hospital encounter of " 01/08/25 (from the past 24 hours)   CBC with Differential   Result Value Ref Range    WBC 4.5 4.4 - 11.3 x10*3/uL    nRBC 0.0 0.0 - 0.0 /100 WBCs    RBC 4.27 4.00 - 5.20 x10*6/uL    Hemoglobin 11.5 (L) 12.0 - 16.0 g/dL    Hematocrit 35.8 (L) 36.0 - 46.0 %    MCV 84 80 - 100 fL    MCH 26.9 26.0 - 34.0 pg    MCHC 32.1 32.0 - 36.0 g/dL    RDW 15.2 (H) 11.5 - 14.5 %    Platelets 279 150 - 450 x10*3/uL    Neutrophils % 56.2 40.0 - 80.0 %    Immature Granulocytes %, Automated 0.2 0.0 - 0.9 %    Lymphocytes % 35.6 13.0 - 44.0 %    Monocytes % 6.9 2.0 - 10.0 %    Eosinophils % 0.4 0.0 - 6.0 %    Basophils % 0.7 0.0 - 2.0 %    Neutrophils Absolute 2.51 1.20 - 7.70 x10*3/uL    Immature Granulocytes Absolute, Automated 0.01 0.00 - 0.70 x10*3/uL    Lymphocytes Absolute 1.59 1.20 - 4.80 x10*3/uL    Monocytes Absolute 0.31 0.10 - 1.00 x10*3/uL    Eosinophils Absolute 0.02 0.00 - 0.70 x10*3/uL    Basophils Absolute 0.03 0.00 - 0.10 x10*3/uL   Comprehensive Metabolic Panel   Result Value Ref Range    Glucose 177 (H) 74 - 99 mg/dL    Sodium 138 136 - 145 mmol/L    Potassium 3.9 3.5 - 5.3 mmol/L    Chloride 102 98 - 107 mmol/L    Bicarbonate 29 21 - 32 mmol/L    Anion Gap 11 10 - 20 mmol/L    Urea Nitrogen 18 6 - 23 mg/dL    Creatinine 0.88 0.50 - 1.05 mg/dL    eGFR 82 >60 mL/min/1.73m*2    Calcium 9.5 8.6 - 10.6 mg/dL    Albumin 4.1 3.4 - 5.0 g/dL    Alkaline Phosphatase 103 33 - 110 U/L    Total Protein 8.1 6.4 - 8.2 g/dL    AST 15 9 - 39 U/L    Bilirubin, Total 0.2 0.0 - 1.2 mg/dL    ALT 37 7 - 45 U/L   Levetiracetam   Result Value Ref Range    Keppra 42 (H) 10 - 40 ug/mL   Sedimentation rate, automated   Result Value Ref Range    Sedimentation Rate 52 (H) 0 - 20 mm/h   C-reactive protein   Result Value Ref Range    C-Reactive Protein 0.54 <1.00 mg/dL   SST TOP   Result Value Ref Range    Extra Tube Hold for add-ons.    hCG, quantitative, pregnancy   Result Value Ref Range    HCG, Beta-Quantitative 3 <5 mIU/mL   Type and Screen    Result Value Ref Range    ABO TYPE A     Rh TYPE POS     ANTIBODY SCREEN NEG    Coagulation Screen   Result Value Ref Range    Protime 10.9 9.8 - 12.8 seconds    INR 1.0 0.9 - 1.1    aPTT 34 27 - 38 seconds     *Note: Due to a large number of results and/or encounters for the requested time period, some results have not been displayed. A complete set of results can be found in Results Review.                                    I have personally reviewed the following imaging results CT head wo IV contrast    Result Date: 1/8/2025  Interpreted By:  Ernie Fiore  and Meghna Briggs STUDY: CT HEAD WO IV CONTRAST;  1/8/2025 7:03 pm   INDICATION: Signs/Symptoms:shunt, HA, Szs.   COMPARISON: CT head 01/04/2025   ACCESSION NUMBER(S): [id number]   ORDERING CLINICIAN: DERRICK LEVI   TECHNIQUE: Noncontrast axial CT images of head were obtained with coronal and sagittal reconstructed images.   FINDINGS: BRAIN PARENCHYMA:  No acute intraparenchymal hemorrhage or parenchymal evidence of acute large territory ischemic infarct. No mass-effect. Gray-white matter distinction is preserved. There is similar encephalomalacia along the course of the right frontal approach ventriculostomy catheter within the right frontal lobe. No kinking or discontinuity along the visualized course of the catheter.   VENTRICLES and EXTRA-AXIAL SPACES: Stable positioning of the right frontal approach ventriculostomy catheter with the tip terminating near the foramen of [place]ro. No acute extra-axial or intraventricular hemorrhage. No effacement of cerebral sulci. Ventricles and sulci are similar compared to prior exam.   PARANASAL SINUSES/MASTOIDS:  No hemorrhage or air-fluid levels within the visualized paranasal sinuses. The mastoids are well aerated.   CALVARIUM/ORBITS: Redemonstrated right frontal sherley hole. No skull fracture. The orbits and globes are intact to the extent visualized.   EXTRACRANIAL SOFT TISSUES: No discernible abnormality.        1. No acute intracranial abnormality. 2. Similar positioning of the right frontal approach ventriculostomy catheter and stable appearance of the ventricles.   I personally reviewed the images/study and I agree with the findings as stated by Chester Coffman MD.   MACRO: None.   Signed by: Ernie Fiore 1/8/2025 7:25 PM Dictation workstation:   [id number]    XR shunt series    Result Date: 1/8/2025  Interpreted By:  Boo Davis, STUDY: XR SHUNT SERIES; 1/8/2025 6:38 pm   INDICATION: Signs/Symptoms:check  shunt.   COMPARISON: 01/04/2025.   ACCESSION NUMBER(S): [id number]   ORDERING CLINICIAN: DERRICK LEVI   FINDINGS: Two views of the skull in AP and lateral projection, a single AP view of the chest and a single AP view of the abdomen were obtained. A frontal ventriculostomy shunt catheter is present. Shunt tubing is seen extending over the  right lateral skull,  right neck,  right anterior chest and is seen to coil over the  midline/left abdomen. Low lung volumes with mild bibasilar atelectasis. There is a nonobstructive bowel gas pattern present.  No evidence of acute fracture is identified.  The visualized paranasal sinuses are clear. Right chest port with terminus at the right atrium.       1.  Right frontal ventriculostomy shunt catheter, as described above. 2.  Low lung volumes with mild bibasilar atelectasis. 3.  Nonobstructive bowel gas pattern.   Signed by: Boo Davis 1/8/2025 6:43 PM Dictation workstation:   [id number]    CT abdomen pelvis w IV contrast    Result Date: 1/4/2025  STUDY: CT Abdomen and Pelvis with IV Contrast; [01/04/2025 3:39 AM INDICATION: Left-sided abdominal pain. COMPARISON: CT abdomen pelvis 02/22/2024, 02/06/2024, 04/30/2023. ACCESSION NUMBER(S): [id number] ORDERING CLINICIAN: LESLIE MANCINI TECHNIQUE: CT of the abdomen and pelvis was performed.  Contiguous axial images were obtained at 3 mm slice thickness through the abdomen and pelvis. Coronal and sagittal  reconstructions at 3 mm slice thickness were performed.  75 mL Omnipaque-350 was administered intravenously.  FINDINGS: LOWER CHEST: Catheter tip is seen at the cavoatrial junction.  Cardiac size is normal.  No pericardial effusion.  Minimal atelectasis is present in the lung bases.  ABDOMEN:  LIVER: No hepatomegaly.  Smooth surface contour.  Normal attenuation.  BILE DUCTS: No intrahepatic or extrahepatic biliary ductal dilatation.  GALLBLADDER: The patient is status post cholecystectomy.  STOMACH: No abnormalities identified.  PANCREAS: No masses or ductal dilatation.  SPLEEN: No splenomegaly or focal splenic lesion.  ADRENAL GLANDS: No thickening or nodules.  KIDNEYS AND URETERS: Kidneys are normal in size and location.  No renal or ureteral calculi.  PELVIS:   shunt catheter is in place entering the left midabdomen, extending to the pelvis, and terminating in the left midabdomen.   BLADDER: No abnormalities identified.  REPRODUCTIVE ORGANS: The patient is status post hysterectomy.  No abnormalities identified.  BOWEL: Appendix appears to be absent.  Terminal ileum is unremarkable.  No abnormalities identified.  VESSELS: Small pelvic phleboliths are noted.  Abdominal aorta is normal in caliber.  PERITONEUM/RETROPERITONEUM/LYMPH NODES: No pneumoperitoneum.  No lymphadenopathy. Trace simple fluid is seen in the pelvis which is likely related to the  shunt catheter.   ABDOMINAL WALL: No abnormalities identified. SOFT TISSUES: No abnormalities identified.  BONES: Mild disc disease and vacuum phenomenon is seen at L2-3.  No acute fracture or aggressive osseous lesion.    No definite acute intra-abdominal pathology identified.   shunt catheter is seen extending through the left side of the abdomen without evidence of complication. Signed by Vahid MARTINES shunt series    Result Date: 1/4/2025  INDICATION:  Vomiting.  Shunt evaluation. TECHNIQUE:  Two views of the skull and one view of the abdomen (2 total  abdominal images). COMPARISON:  CT AP 1/4/2025.  XR shunt evaluation 11/7/2024, 10/23/2024. FINDINGS:  shunt catheter is seen along a right frontoparietal approach. Catheter tubing appears intact with a catheter extending to the left side of the abdomen and pelvis.  There is no evidence of discontinuity.  Excreted contrast is seen in the renal collecting systems and ureters.  Cholecystectomy clips are noted. Cardiac size is normal.  Lungs are clear.     shunt catheter tubing appears intact. Signed by Vhaid Arrington    CT head wo IV contrast    Result Date: 1/4/2025  Interpreted By:  Lawanda Elias, STUDY: CT HEAD WO IV CONTRAST;  1/4/2025 3:38 am   INDICATION: Signs/Symptoms:vomiting.   COMPARISON: CT head 12/09/2024, 11/15/2024   ACCESSION NUMBER(S): [id number]   ORDERING CLINICIAN: LESLIE MANCINI   TECHNIQUE: Noncontrast CT axial images were obtained through the brain.  Coronal and sagittal reformats were performed.   FINDINGS: Stable appearance of the right frontal approach ventriculostomy catheter with the tip terminating near the region of the right foramen of [place]. Redemonstration of gliosis or encephalomalacia along the catheter at the right frontal lobe. The ventricles are not dilated, stable. The grey-white matter differentiation is intact. No acute territorial infarct.  There is no mass effect or midline shift. There is no extraaxial fluid collection.  There is no intracranial hemorrhage. No depressed calvarial fracture. No air-fluid levels at the visualized paranasal sinuses. The mastoid air cells are clear.       1. No acute intracranial hemorrhage or acute territorial infarct. 2. Right frontal approach ventriculostomy shunt catheter. Stable appearance of the ventricles.         MACRO: None.   Signed by: Lawanda Elias 1/4/2025 3:48 AM Dictation workstation:   [id number]    OCT, Optic Nerve - OU - Both Eyes    Result Date: 12/16/2024  Retinal multimodal imaging including photography was  completed, and the findings are described in the examination.  .      Assessment/Plan   Assessment & Plan    Jonathan Ayala is a 49 y.o. 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  Patient presentation started with severe headache that is suggestive of high ICP with LP for OP that is pending. This could be related to shunt failure. The patient seizure description of right head version and left arm twitching with eyes closed is more suggestive of PNES rather than actual seizure.  Patient was noted to have a mix of PNES and epileptic events based on her last EMU admission at [facility].  Patient is compliant to her medications (Keppra 1500 twice daily and lacosamide 250 mg twice daily)    Recommendations:  -Continue on Keppra 1500 twice daily and lacosamide 250 mg twice daily.   -Patient should be instructed to follow with her [facility] epilepsy provider after discharge.     Plans are not final until signed by the attending physician in the morning.    Addison Quezada MD  Neurology, PGY-2

## 2025-01-09 NOTE — PROGRESS NOTES
"OPHTHALMOLOGY PROGRESS NOTE      Jonathan Ayala is a 49 y.o. 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     Subjective   Today, pt reports stable blurry vision of the right eye, but feels her field of vision has improved. She denies diplopia. Does report headache.   S/p lumbar puncture yesterday which showed opening pressure of 27. Shunt dialed to 4 from 5 this morning.      Objective     Last Recorded Vitals  Blood pressure 119/78, pulse 78, temperature 35.7 °C (96.3 °F), temperature source Temporal, resp. rate 16, height 1.575 m (5' 2.01\"), weight 116 kg (256 lb 13.4 oz), SpO2 94%.    Physical Exam  Base Eye Exam       Visual Acuity (+2.50 readers)         Right Left    Near cc 20/50+2 ph 20/30- 20/50-1 ph 20/50+2      Correction: Glasses              Tonometry (Tonopen, 12:03 PM)         Right Left    Pressure 11 11              Pupils         Dark Light Shape React APD    Right 6 5 Round Brisk None    Left 6 Flicker of constriction Round Bare tr APD              Visual Fields         Left Right                                Extraocular Movement         Right Left     Full Full              Neuro/Psych       Oriented x3: Yes    Mood/Affect: Normal                  Additional Tests       Color         Right Left    Ishihara Unable (did not read test plate) Unable (did not read test plate)                  Slit Lamp and Fundus Exam       External Exam         Right Left    External Normal Normal         "      Slit Lamp Exam         Right Left    Lids/Lashes Normal Normal    Conjunctiva/Sclera White and quiet White and quiet    Cornea Clear Clear    Anterior Chamber Deep and quiet Deep and quiet    Iris Round and reactive Round and reactive    Lens Clear Clear    Anterior Vitreous Normal Normal              Fundus Exam         Right Left    Disc pallor temporally Pallor    C/D Ratio 0.15 0.15                      Assessment/Plan     #Idiopathic intracranial hypertension  #NAION, both eyes   #S/p  shunt  #Monocular diplopia, right eye   - Jonathan Ayala is a 49 y.o. 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      - Upon initial evaluation on 1/8, pt's symptoms were consistent with monocular diplopia, likely due to refractive error vs dry eye,  with stable visual acuity and absence of optic nerve edema.   - Neurosurgery consultation was recommended. Shunt studies normal and CTH without evidence of ventriculomegaly.   - 1/8/25 LP performed, opening pressure 27, WBC 6, RBC 3000, protein 114, glucose 79. CSF cultures pending. Certas shunt dialed from 5 to 4.     - Today, 1/9/25, patient's visual acuity continues to be stable. There is fluctuating color vision and visual field deficit compared to yesterday, however optic nerves continue to be flat, which is reassuring, though the ICP was elevated on lumbar puncture.     Recommendations:   - Appreciate neurosurgery recommendations and management of elevated  ICP.  - Ophthalmology will continue to follow       Discussed with Dr. Rob Mederos, neuro-ophthalmology attending.      Herminia Marie MD  Ophthalmology, PGY3    Ophthalmology Adult Pager - [phone]  Ophthalmology Pediatrics Pager (M-F 8:00am-5:00pm) - [phone]    For adult follow-up appointments, call: [phone]  For pediatric follow-up appointments, call: [phone]     Herminia Marie MD

## 2025-01-09 NOTE — CARE PLAN
The patient's goals for the shift include      The clinical goals for the shift include  HDS through the shift.      Problem: Respiratory  Goal: Minimize anxiety/maximize coping throughout shift  Outcome: Progressing     Problem: Pain - Adult  Goal: Verbalizes/displays adequate comfort level or baseline comfort level  Outcome: Progressing     Problem: Safety - Adult  Goal: Free from fall injury  Outcome: Progressing     Problem: Discharge Planning  Goal: Discharge to home or other facility with appropriate resources  Outcome: Progressing     Problem: Chronic Conditions and Co-morbidities  Goal: Patient's chronic conditions and co-morbidity symptoms are monitored and maintained or improved  Outcome: Progressing

## 2025-01-09 NOTE — SIGNIFICANT EVENT
Epilepsy post staffing update    -Please order routine EEG  -Increase home lacosamide to 300 twice daily  -Continue Keppra 1500 mg twice daily  -Will follow routine EEG  -Seizure precautions on discharge as below  -Follow-up with outpatient [facility] epileptologist      Do not to drive, use power tools or operate heavy machinery, and should not be on ladders. Use the shower and not the bath. Likewise, refrain from any activity which could result in injury to themselves or others if they had a seizure or lost consciousness. These restrictions should continue until instructed by a doctor to do otherwise.        Marco Arana MD PhD  PGY-4 Neurology

## 2025-01-09 NOTE — H&P
History Of Present Illness  Jonathan Ayala is a 49 y.o. female presenting with shunt evaluation.    Patient with couple weeks of nausea and vomiting and previously seen at OSH, CT AP at that time negative. However yesterday evening had two back to back episodes of LUE shaking which is consistent with her seizure semiology. Reported that she has been taking her AEDs as usual. Otherwise also developed double vision and increased blurry vision to her remaining vision.      Patient denied any weakness, numbness, vision changes, fevers or chills.     Patient's imaging was personally reviewed and notable for CTH stable vents.    Past Medical History  She has a past medical history of Abnormal findings on diagnostic imaging of other abdominal regions, including retroperitoneum (10/14/2020), Acquired deformity of nose (03/24/2022), Acute upper respiratory infection, unspecified (10/16/2019), Adrenal disease (Multi), Allergic, Allergy status to unspecified drugs, medicaments and biological substances (05/22/2020), Allergy status to unspecified drugs, medicaments and biological substances (11/13/2020), Anemia, Anxiety (2005), Asthma, Benign intracranial hypertension (01/27/2022), Bipolar disorder, unspecified (Multi), Breast calcification, right (08/21/2018), Cellulitis of abdominal wall (09/28/2022), Cervicalgia (07/01/2020), Chronic maxillary sinusitis (01/04/2022), Chronic sialoadenitis (03/16/2020), COVID-19 (01/06/2022), Decreased white blood cell count, unspecified (11/04/2019), Disease of thyroid gland, Disturbances of salivary secretion (03/16/2020), Dry eye syndrome of bilateral lacrimal glands (10/07/2022), Encounter for preprocedural cardiovascular examination (02/01/2022), Food additives allergy status (06/11/2020), Fracture of nasal bones, initial encounter for closed fracture (03/03/2022), GERD (gastroesophageal reflux disease) (15 years old), Granuloma of right orbit (10/07/2021), History of endometrial ablation  (11/09/2017), Hyperlipidemia, Hypertension, Hyperthyroidism, Hypoglycemia, Hypothyroidism, Immunocompromised, Localized swelling, mass and lump, head (03/24/2022), Major depressive disorder, recurrent, in full remission ([facility]) (10/07/2021), 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), Meningitis ([facility]) (2008), Migraine, Nipple discharge (08/24/2022), Ocular pain, right eye (10/07/2022), Optic atrophy, Other abnormal and inconclusive findings on diagnostic imaging of breast (07/06/2020), Other anomalies of pupillary function (05/31/2019), Other chest pain (05/18/2020), Other conditions influencing health status (08/01/2022), Other conditions influencing health status (08/03/2021), Other specified disorders of eustachian tube, left ear (11/18/2019), Other specified disorders of nose and nasal sinuses (03/24/2022), Other specified disorders of nose and nasal sinuses (03/24/2022), Pelvic and perineal pain (07/06/2020), Personal history of other diseases of the circulatory system (04/07/2020), Personal history of other diseases of the circulatory system (04/07/2020), Personal history of other diseases of the circulatory system, Personal history of other diseases of the digestive system, Personal history of other diseases of the digestive system (03/02/2020), Personal history of other diseases of the musculoskeletal system and connective tissue (01/19/2022), Personal history of other diseases of the musculoskeletal system and connective tissue (03/02/2021), Personal history of other diseases of the musculoskeletal system and connective tissue (06/16/2020), Personal history of other diseases of the nervous system and sense organs (11/18/2019), Personal history of other diseases of the nervous system and sense organs (09/21/2022), Personal history of other diseases of the respiratory system (04/14/2021), Personal history of other endocrine, nutritional and metabolic disease (02/17/2021), Personal history of other  mental and behavioral disorders (05/27/2021), Personal history of other specified conditions (09/07/2022), Personal history of other specified conditions (10/16/2019), Personal history of other specified conditions (09/28/2022), Personal history of other specified conditions (09/16/2021), Personal history of other specified conditions (02/01/2022), Personal history of other specified conditions (03/09/2022), Personal history of other specified conditions (02/12/2014), Personal history of other specified conditions (10/27/2021), Personal history of other specified conditions (10/16/2019), Personal history of other specified conditions (02/26/2021), Personal history of other specified conditions (02/22/2021), Personal history of urinary calculi, Polycystic ovary syndrome, Postural orthostatic tachycardia syndrome (POTS), Rash and other nonspecific skin eruption (03/15/2022), Repeated falls (06/23/2021), Right lower quadrant pain (10/14/2020), Seizures (Multi), Sjogren syndrome, unspecified (Multi), Sjogren's syndrome, Sleep apnea, Slow transit constipation (07/09/2020), Subarachnoid hemorrhage, traumatic (Multi) (04/19/2023), Thyroid nodule, Traumatic subarachnoid hemorrhage with loss of consciousness of unspecified duration, subsequent encounter (03/15/2022), Traumatic subarachnoid hemorrhage without loss of consciousness, subsequent encounter, Type 2 diabetes mellitus, Unspecified disorder of refraction (10/07/2022), Unspecified optic neuritis (11/06/2020), Unspecified optic neuritis (11/06/2020), Unspecified visual loss (09/25/2019), Varicella (As a child), Venous insufficiency (chronic) (peripheral) (10/18/2021), Viral infection, unspecified (01/11/2022), Vitamin D deficiency, and Vitamin D deficiency, unspecified (09/28/2022).    Surgical History  She has a past surgical history that includes Other surgical history (08/22/2019); Other surgical history (08/22/2019); Other surgical history (08/22/2019); Other  surgical history (08/22/2019); Other surgical history (08/22/2019); Other surgical history (08/22/2019); MR angio neck wo IV contrast (02/08/2021); MR angio head wo IV contrast (02/08/2021); [place] tooth extraction (2004); Appendectomy (2017); Hysterectomy (2017); Hernia repair (Right, 03/01/2024); Cardiac catheterization; Cholecystectomy; Fracture surgery (12/2023); Endometrial ablation; and Brain surgery ([place] placement to measure pressure).     Social History  She reports that she has never smoked. She has never used smokeless tobacco. She reports that she does not currently use alcohol. She reports current drug use. Drug: Benzodiazepines.     Allergies  Acetazolamide, Atorvastatin, Cefdinir, Ceftriaxone, Doxepin, Duloxetine, Fd and c red no.40, Levofloxacin, Levofloxacin in d5w, Nutritional supplements, Ozempic [semaglutide], Prochlorperazine, Red dye, Rosemary, Rosemary oil, Strawberry, Sulfa (sulfonamide antibiotics), Topiramate, Tree pollen-black walnut, Tree pollen-pecan, Allison, Aripiprazole, Aspartame, Aspartame (bulk), Fenofibrate, Gluten, Hydrochlorothiazide, Hydromorphone, Iron, Meclizine, Metformin, Propoxyphene, Statins-hmg-coa reductase inhibitors, Tetracyclines, Thiazides, Venlafaxine, Wheat, Adhesive tape-silicones, Barbiturates, Ciprofloxacin, Dhe, Diet foods, Farxiga [dapagliflozin], Ferrous sulfate, Keflex [cephalexin], L.acidoph-l.bulg-b.bif-s.therm, Nsaids (non-steroidal anti-inflammatory drug), Other, Sulfonylureas, Tobramycin, Vancomycin, Adhesive, Azithromycin, Betamethasone, House dust, Metoclopramide hcl, Prednisone, Propoxyphene-acetaminophen, Sulfacetamide sodium, Zikftnjy-0-[id number] antimigraine agents, and Zolpidem    Medications  Medications Prior to Admission   Medication Sig Dispense Refill Last Dose/Taking    acetaminophen (Tylenol) 325 mg tablet Take 1-2 tablets (325-650 mg) by mouth every 6 hours if needed for mild pain (1 - 3). Days of infusions       Advair -21 mcg/actuation  inhaler INHALE TWO PUFFS BY MOUTH AS INSTRUCTED TWO TIMES A DAY.       amitriptyline (Elavil) 100 mg tablet Take 1 tablet (100 mg) by mouth once daily at bedtime. 30 tablet 11     azelastine (Astelin) 137 mcg (0.1 %) nasal spray Administer 1 spray into each nostril 2 times a day. Use in each nostril as directed       blood-glucose sensor (DEXCOM G7 SENSOR MISC) Use to check blood sugar continuously throughout the day as directed. Change sensor every 10 days.       calcium-vitamin D3-vitamin K (Viactiv) 650 mg-12.5 mcg-40 mcg chewable tablet Chew 2 tablets once daily. At lunch       carboxymethylcellulose (Refresh Celluvisc) 1 % ophthalmic solution dropperette Administer 2 drops into both eyes 3 times a day as needed for dry eyes.       cholecalciferol (Vitamin D-3) 5,000 Units tablet Take 1 tablet (5,000 Units) by mouth once daily.       clonazePAM (KlonoPIN) 0.5 mg tablet Take 1 tablet (0.5 mg) by mouth 2 times a day.       Dexcom G7  misc Use as instructed to check blood sugars continuously throughout the day       dicyclomine (Bentyl) 20 mg tablet Take 1 tablet (20 mg) by mouth 4 times a day as needed (spasm pain). 20 tablet 0     divalproex (Depakote ER) 500 mg 24 hr tablet Take 1 tablet (500 mg) by mouth 2 times a day.       EPINEPHrine 0.3 mg/0.3 mL injection syringe INJECT INTRAMUSCULARLY ONCE AS NEEDED FOR ANAPHYLAXIS 2 each 0     ezetimibe (Zetia) 10 mg tablet Take 1 tablet (10 mg) by mouth once daily. 30 tablet 11     fluticasone (Flonase) 50 mcg/actuation nasal spray USE 1 SPRAY INTO EACH NOSTRIL ONCE DAILY. 16 g 3     folic acid (Folvite) 1 mg tablet Take 1 tablet (1 mg) by mouth once daily. 90 tablet 3     furosemide (Lasix) 20 mg tablet Take 1 tablet (20 mg) by mouth 2 times a day. 180 tablet 3     gloves, latex with aloe vera misc Large size gloves 200 each 3     glucagon (Baqsimi) 3 mg/actuation spray,non-aerosol USE ONE SPRAY IN THE NOSE AS NEEDED FOR LOW BLOOD SUGAR  MAY REPEAT AFTER 15  MINUTES USING A NEW DEVICE IF NO RESPONSE 1 each 3     glucagon (Glucagen) 1 mg injection Inject 1 mg under the skin 1 time if needed for low blood sugar - see comments (hypoglycemia). 1 each 12     hydrocortisone (Cortef) 10 mg tablet Take 1 tablet (10 mg) by mouth 2 times a day. Double the dose if sick for three days 70 tablet 11     immune globulin, human, (Gammagard) infusion Infuse 600 mL (60 g) into a venous catheter every 14 (fourteen) days.       insulin glargine (Toujeo Max Solostar- 2 unit dial) 300 unit/mL (3 mL) injection Inject 54 units under the skin once daily 6 mL 6     insulin lispro (HumaLOG KwikPen Insulin) 100 unit/mL injection Use as directed to give up to 100 units a day 90 mL 3     ipratropium-albuteroL (Duo-Neb) 0.5-2.5 mg/3 mL nebulizer solution Inhale 3 mL 4 times a day as needed.       lacosamide (Vimpat) 200 mg tablet tablet Take 1 tablet (200 mg) by mouth 2 times a day.       lacosamide (Vimpat) 50 mg tablet Take 1 tablet (50 mg) by mouth every 12 hours.       lasmiditan 100 mg tablet Take 100 mg by mouth if needed (migraine). Do not drive in 8 hours of taking this medicine. Maximum one dose per 24 hours. 9 tablet 3     levETIRAcetam (Keppra) 750 mg tablet Take 2 tablets (1,500 mg) by mouth 2 times a day.       levothyroxine (Synthroid, Levoxyl) 50 mcg tablet Take 1.5 tablets (75 mcg) by mouth once daily in the morning. Take before meals. 45 tablet 11     miconazole (Micotin) 2 % cream Apply 1 Application topically once daily as needed (rash).       miconazole (Micotin) 2 % powder Apply to groin , under the breast and skin folds daily       miscellaneous medical supply [facility] Wipes  fragrance free 200 each 3     moisturizing mouth (Biotene Oral Dry Mouth) solution Take 1 spray by mouth 4 times a day as needed.       montelukast (Singulair) 10 mg tablet TAKE ONE TABLET BY MOUTH EVERY DAY AT BEDTIME 90 tablet 0     Motegrity 2 mg tablet Take 1 tablet (2 mg) by mouth once daily.        "nasal spray Nayzilam 5 mg/spray (0.1 mL) spray,non-aerosol Administer into affected nostril(s).       ondansetron ODT (Zofran-ODT) 4 mg disintegrating tablet Take 1 tablet (4 mg) by mouth every 8 hours if needed for nausea or vomiting. 20 tablet 2     OneTouch Delica Plus Lancet 33 gauge misc USE 1 LANCET TO CHECK GLUCOSE ONCE DAILY AS DIRECTED       OneTouch Verio test strips strip USE 1 STRIP TO CHECK GLUCOSE ONCE DAILY AS DIRECTED       pantoprazole (Protonix) 40 mg EC tablet Take 1 tablet (40 mg) by mouth 2 times a day before meals.       pen needle, diabetic (BD Lela 2nd Gen Pen Needle) 32 gauge x 5/32\" needle Use as directed with insulin pen up to 5 times daily 500 each 2     polyethylene glycol (Glycolax, Miralax) 17 gram/dose powder Mix 17 g of powder and drink once daily as needed for constipation.       propranolol LA (Inderal LA) 60 mg 24 hr capsule Take 1 capsule (60 mg) by mouth once daily. Do not crush, chew, or split. 30 capsule 11     rOPINIRole (Requip) 0.5 mg tablet Take 1 tablet by mouth (0.5mg) in the morning and 2 tablets (1mg) by mouth in the evening       senna 8.6 mg tablet Take 1-2 tablets (8.6-17.2 mg) by mouth as needed at bedtime for constipation.       tiZANidine (Zanaflex) 2 mg tablet Take 1 tablet (2 mg) by mouth every 8 hours if needed for muscle spasms.       ubrogepant (Ubrelvy) 100 mg tablet tablet Take 1 tablet (100 mg) by mouth if needed (migraine). May repeat in 2 hours for max of 200mg per 24 hours. 16 tablet 3     ZINC ORAL Take 50 mg by mouth once daily.          Review of Systems negative other than listed HPI.     Physical Exam  A&Ox3  Face symmetric  RUE 5/5  LUE 5/5  RLE 5/5  LLE 5/5  Sensation intact to light touch throughout all extremities    Last Recorded Vitals  Blood pressure 167/85, pulse 94, temperature 35.2 °C (95.4 °F), temperature source Temporal, resp. rate 16, height 1.575 m (5' 2\"), weight 115 kg (253 lb), SpO2 95%.    Relevant Results    Assessment/Plan "   Assessment & Plan   (ventriculoperitoneal) shunt status      49yF h/o IIH (dx 2/2022 w/ OP 25) s/p R frontal bolt c/b tract hemorrhage and focal epilepsy, right hemiplegic migraines, CVID on monthly IVIG infusions, Sjogren's syndrome/scleroderma, POTS, T2DM, HTN, hypothyroidism, BRENT on CPAP, anxiety/depression, 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 10/30 s/p R VPS exploration w abdominal catheter repositioning w improvement in distal runnoff, 11/6 p/w min clear incisional drainage, min decr in vision, incision oversewn with no further leakage noted, 11/15 she presented to the ED with fluid leaking from incision site and headache, 11/15 s/p RF shunt wound revision and fractured valve replacement, 12/9 p/w wound dehiscence/incisional leakage, CTH stable vents, SS is intact, 12/10 s/p R cranial wound exploration, washout, revision, 1/8 p/w double vision, nausea and vomiting, and 2 seizure events, CTH stable vents, SS intact certas at 5, CT AP no pseudocyst, s/p LP (OP 27)    PLAN  Admit for observation  Follow up CSF culture  Will re-evaluate symptoms    Jesus Lambert MD  Note authored by resident on neurosurgery team, with all questions or to contact team please page at [phone]

## 2025-01-09 NOTE — SIGNIFICANT EVENT
Patient's shunt was set at certas 5 on admission. Shunt was dialed down to certas 4 this morning.    Rob Durham MD  Neurosurgery

## 2025-01-09 NOTE — PROCEDURES
Lumbar Puncture    Date/Time: 1/8/2025 10:58 PM    Performed by: Jesus Lambert MD  Authorized by: Deborah Johns DO    Consent:     Consent obtained:  Written    Risks, benefits, and alternatives were discussed: yes      Risks discussed:  Infection, bleeding, pain, repeat procedure, nerve damage and headache    Alternatives discussed:  No treatment  Universal protocol:     Procedure explained and questions answered to patient or proxy's satisfaction: yes      Relevant documents present and verified: yes      Test results available: yes      Imaging studies available: yes      Immediately prior to procedure a time out was called: yes      Site/side marked: yes      Patient identity confirmed:  Verbally with patient  Pre-procedure details:     Procedure purpose:  Diagnostic    Preparation: Patient was prepped and draped in usual sterile fashion    Procedure details:     Lumbar space:  L3-L4 interspace    Number of attempts:  1    Opening pressure (cm H2O):  27    Tubes of fluid:  3  Post-procedure details:     Procedure completion:  Tolerated

## 2025-01-09 NOTE — HOSPITAL COURSE
48 yo female with Hx of IIH (dx 2/2022 w/ OP 25) s/p R frontal bolt c/b tract hemorrhage and focal epilepsy, right hemiplegic migraines, CVID on monthly IVIG infusions, Sjogren's syndrome/scleroderma, POTS, T2DM, HTN, hypothyroidism, BRENT on CPAP, anxiety/depression, 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 10/30 s/p R VPS exploration w abdominal catheter repositioning w improvement in distal runnoff, 11/6 p/w min clear incisional drainage, min decr in vision, incision oversewn with no further leakage noted, 11/15 she presented to the ED with fluid leaking from incision site and headache, 11/15 s/p RF shunt wound revision and fractured valve replacement, 12/9 p/w wound dehiscence/incisional leakage, CTH stable vents, SS is intact, 12/10 s/p R cranial wound exploration, washout, revision.  On 1/8/25 she presented to the ED with double vision, nausea and vomiting, and 2 seizure events.   CTH stable vents, SS intact certas at 5, CT AP no pseudocyst, s/p LP (OP 27). She was then admitted to Neurosurgery for observation. 1/9 shunt dialed to certas 4, routine EEG neg. Ophthalmology consulted--> visual acuity continues to be stable. There is fluctuating color vision and visual field deficit compared to yesterday, however optic nerves continue to be flat.  Neurology consulted--> EEG placed; Lacosemide increased to 300 mg twice a day, continues on home Keppra. Will need to follow up outpatient with [facility] neurologist.   1/10 optho eval with visual acuity improvement with stable visual field cuts and color deficit, will need to follow up outpatient in 1 month with Dr. Mederos.     On the day of discharge, the patient was seen and evaluated by the neurosurgery team and deemed suitable for discharge. The patient was given detailed discharge instructions and were scheduled to follow up as an outpatient.

## 2025-01-10 LAB
GLUCOSE BLD MANUAL STRIP-MCNC: 117 MG/DL (ref 74–99)
GLUCOSE BLD MANUAL STRIP-MCNC: 129 MG/DL (ref 74–99)
GLUCOSE BLD MANUAL STRIP-MCNC: 148 MG/DL (ref 74–99)
GLUCOSE BLD MANUAL STRIP-MCNC: 87 MG/DL (ref 74–99)
LACOSAMIDE SERPL-MCNC: 12.7 UG/ML (ref 1–10)
SCAN RESULT: NORMAL
VALPROATE FREE SERPL-MCNC: 4.5 UG/ML (ref 4–30)

## 2025-01-10 PROCEDURE — 2500000001 HC RX 250 WO HCPCS SELF ADMINISTERED DRUGS (ALT 637 FOR MEDICARE OP): Mod: SE

## 2025-01-10 PROCEDURE — G0378 HOSPITAL OBSERVATION PER HR: HCPCS

## 2025-01-10 PROCEDURE — 82947 ASSAY GLUCOSE BLOOD QUANT: CPT

## 2025-01-10 PROCEDURE — 2500000002 HC RX 250 W HCPCS SELF ADMINISTERED DRUGS (ALT 637 FOR MEDICARE OP, ALT 636 FOR OP/ED): Mod: SE

## 2025-01-10 PROCEDURE — 2500000004 HC RX 250 GENERAL PHARMACY W/ HCPCS (ALT 636 FOR OP/ED): Mod: SE

## 2025-01-10 PROCEDURE — 2500000001 HC RX 250 WO HCPCS SELF ADMINISTERED DRUGS (ALT 637 FOR MEDICARE OP): Mod: SE | Performed by: NEUROLOGICAL SURGERY

## 2025-01-10 PROCEDURE — 96372 THER/PROPH/DIAG INJ SC/IM: CPT

## 2025-01-10 RX ORDER — ONDANSETRON HYDROCHLORIDE 2 MG/ML
4 INJECTION, SOLUTION INTRAVENOUS EVERY 8 HOURS PRN
Status: DISCONTINUED | OUTPATIENT
Start: 2025-01-10 | End: 2025-01-11 | Stop reason: HOSPADM

## 2025-01-10 RX ORDER — LEVETIRACETAM 750 MG/1
1500 TABLET ORAL 2 TIMES DAILY
Qty: 120 TABLET | Refills: 1 | Status: SHIPPED | OUTPATIENT
Start: 2025-01-10 | End: 2025-03-11

## 2025-01-10 RX ORDER — ONDANSETRON 4 MG/1
4 TABLET, ORALLY DISINTEGRATING ORAL EVERY 8 HOURS PRN
Status: DISCONTINUED | OUTPATIENT
Start: 2025-01-10 | End: 2025-01-11 | Stop reason: HOSPADM

## 2025-01-10 RX ORDER — LACOSAMIDE 150 MG/1
300 TABLET ORAL 2 TIMES DAILY
Qty: 120 TABLET | Refills: 1 | Status: SHIPPED | OUTPATIENT
Start: 2025-01-10 | End: 2025-03-11

## 2025-01-10 RX ADMIN — ONDANSETRON 4 MG: 4 TABLET, ORALLY DISINTEGRATING ORAL at 14:07

## 2025-01-10 RX ADMIN — LACOSAMIDE 300 MG: 100 TABLET, FILM COATED ORAL at 20:55

## 2025-01-10 RX ADMIN — PANTOPRAZOLE SODIUM 40 MG: 40 TABLET, DELAYED RELEASE ORAL at 16:41

## 2025-01-10 RX ADMIN — EZETIMIBE 10 MG: 10 TABLET ORAL at 08:44

## 2025-01-10 RX ADMIN — HYDROCORTISONE 10 MG: 10 TABLET ORAL at 08:44

## 2025-01-10 RX ADMIN — OXYCODONE 5 MG: 5 TABLET ORAL at 12:37

## 2025-01-10 RX ADMIN — HEPARIN SODIUM 7500 UNITS: 5000 INJECTION INTRAVENOUS; SUBCUTANEOUS at 22:27

## 2025-01-10 RX ADMIN — LEVOTHYROXINE SODIUM 75 MCG: 75 TABLET ORAL at 06:15

## 2025-01-10 RX ADMIN — AZELASTINE HYDROCHLORIDE 1 SPRAY: 137 SPRAY, METERED NASAL at 20:56

## 2025-01-10 RX ADMIN — FLUTICASONE PROPIONATE AND SALMETEROL XINAFOATE 2 PUFF: 230; 21 AEROSOL, METERED RESPIRATORY (INHALATION) at 18:23

## 2025-01-10 RX ADMIN — DIVALPROEX SODIUM 500 MG: 500 TABLET, FILM COATED, EXTENDED RELEASE ORAL at 08:43

## 2025-01-10 RX ADMIN — FUROSEMIDE 20 MG: 20 TABLET ORAL at 08:43

## 2025-01-10 RX ADMIN — LACOSAMIDE 300 MG: 100 TABLET, FILM COATED ORAL at 08:45

## 2025-01-10 RX ADMIN — FLUTICASONE PROPIONATE 1 SPRAY: 50 SPRAY, METERED NASAL at 10:15

## 2025-01-10 RX ADMIN — AZELASTINE HYDROCHLORIDE 1 SPRAY: 137 SPRAY, METERED NASAL at 08:46

## 2025-01-10 RX ADMIN — PRUCALOPRIDE 2 MG: 2 TABLET, FILM COATED ORAL at 08:50

## 2025-01-10 RX ADMIN — CLONAZEPAM 0.5 MG: 0.5 TABLET ORAL at 20:55

## 2025-01-10 RX ADMIN — FUROSEMIDE 20 MG: 20 TABLET ORAL at 20:55

## 2025-01-10 RX ADMIN — HYDROCORTISONE 10 MG: 10 TABLET ORAL at 20:55

## 2025-01-10 RX ADMIN — LEVETIRACETAM 1500 MG: 750 TABLET, FILM COATED ORAL at 20:55

## 2025-01-10 RX ADMIN — DIVALPROEX SODIUM 500 MG: 500 TABLET, FILM COATED, EXTENDED RELEASE ORAL at 20:55

## 2025-01-10 RX ADMIN — FOLIC ACID 1 MG: 1 TABLET ORAL at 08:43

## 2025-01-10 RX ADMIN — POLYETHYLENE GLYCOL 3350 17 G: 17 POWDER, FOR SOLUTION ORAL at 08:45

## 2025-01-10 RX ADMIN — ROPINIROLE HYDROCHLORIDE 1 MG: 1 TABLET, FILM COATED ORAL at 20:55

## 2025-01-10 RX ADMIN — INSULIN GLARGINE 54 UNITS: 100 INJECTION, SOLUTION SUBCUTANEOUS at 10:29

## 2025-01-10 RX ADMIN — PROPRANOLOL HYDROCHLORIDE 60 MG: 60 CAPSULE, EXTENDED RELEASE ORAL at 08:44

## 2025-01-10 RX ADMIN — MONTELUKAST 10 MG: 10 TABLET, FILM COATED ORAL at 20:55

## 2025-01-10 RX ADMIN — HEPARIN SODIUM 7500 UNITS: 5000 INJECTION INTRAVENOUS; SUBCUTANEOUS at 06:15

## 2025-01-10 RX ADMIN — LEVETIRACETAM 1500 MG: 750 TABLET, FILM COATED ORAL at 08:43

## 2025-01-10 RX ADMIN — PANTOPRAZOLE SODIUM 40 MG: 40 TABLET, DELAYED RELEASE ORAL at 06:15

## 2025-01-10 RX ADMIN — HEPARIN SODIUM 7500 UNITS: 5000 INJECTION INTRAVENOUS; SUBCUTANEOUS at 14:07

## 2025-01-10 RX ADMIN — Medication 5000 UNITS: at 08:43

## 2025-01-10 RX ADMIN — CLONAZEPAM 0.5 MG: 0.5 TABLET ORAL at 08:45

## 2025-01-10 RX ADMIN — FLUTICASONE PROPIONATE AND SALMETEROL XINAFOATE 2 PUFF: 230; 21 AEROSOL, METERED RESPIRATORY (INHALATION) at 08:57

## 2025-01-10 RX ADMIN — AMITRIPTYLINE HYDROCHLORIDE 100 MG: 100 TABLET ORAL at 20:55

## 2025-01-10 SDOH — ECONOMIC STABILITY: FOOD INSECURITY: WITHIN THE PAST 12 MONTHS, YOU WORRIED THAT YOUR FOOD WOULD RUN OUT BEFORE YOU GOT MONEY TO BUY MORE.: OFTEN TRUE

## 2025-01-10 SDOH — ECONOMIC STABILITY: HOUSING INSECURITY: IN THE LAST 12 MONTHS, WAS THERE A TIME WHEN YOU WERE NOT ABLE TO PAY THE MORTGAGE OR RENT ON TIME?: YES

## 2025-01-10 SDOH — ECONOMIC STABILITY: FOOD INSECURITY

## 2025-01-10 SDOH — ECONOMIC STABILITY: FOOD INSECURITY: WITHIN THE PAST 12 MONTHS, YOU WORRIED THAT YOUR FOOD WOULD RUN OUT BEFORE YOU GOT THE MONEY TO BUY MORE.: OFTEN TRUE

## 2025-01-10 SDOH — ECONOMIC STABILITY: HOUSING INSECURITY

## 2025-01-10 SDOH — ECONOMIC STABILITY: FOOD INSECURITY: WITHIN THE PAST 12 MONTHS, THE FOOD YOU BOUGHT JUST DIDN'T LAST AND YOU DIDN'T HAVE MONEY TO GET MORE.: PATIENT DECLINED

## 2025-01-10 SDOH — ECONOMIC STABILITY: HOUSING INSECURITY: IN THE PAST 12 MONTHS HAS THE ELECTRIC, GAS, OIL, OR WATER COMPANY THREATENED TO SHUT OFF SERVICES IN YOUR HOME?: NO

## 2025-01-10 SDOH — ECONOMIC STABILITY: INCOME INSECURITY: IN THE LAST 12 MONTHS, WAS THERE A TIME WHEN YOU WERE NOT ABLE TO PAY THE MORTGAGE OR RENT ON TIME?: YES

## 2025-01-10 SDOH — ECONOMIC STABILITY: HOUSING INSECURITY: IN THE LAST 12 MONTHS, HOW MANY PLACES HAVE YOU LIVED?: 1

## 2025-01-10 SDOH — ECONOMIC STABILITY: TRANSPORTATION INSECURITY
IN THE PAST 12 MONTHS, HAS THE LACK OF TRANSPORTATION KEPT YOU FROM MEDICAL APPOINTMENTS OR FROM GETTING MEDICATIONS?: PATIENT DECLINED

## 2025-01-10 SDOH — ECONOMIC STABILITY: GENERAL

## 2025-01-10 SDOH — ECONOMIC STABILITY: TRANSPORTATION INSECURITY
IN THE PAST 12 MONTHS, HAS LACK OF TRANSPORTATION KEPT YOU FROM MEDICAL APPOINTMENTS OR FROM GETTING MEDICATIONS?: PATIENT DECLINED

## 2025-01-10 SDOH — ECONOMIC STABILITY: TRANSPORTATION INSECURITY

## 2025-01-10 ASSESSMENT — SOCIAL DETERMINANTS OF HEALTH (SDOH): IN THE PAST 12 MONTHS, HAS THE ELECTRIC, GAS, OIL, OR WATER COMPANY THREATENED TO SHUT OFF SERVICE IN YOUR HOME?: NO

## 2025-01-10 ASSESSMENT — PAIN DESCRIPTION - LOCATION: LOCATION: HEAD

## 2025-01-10 ASSESSMENT — COGNITIVE AND FUNCTIONAL STATUS - GENERAL
TOILETING: A LITTLE
MOBILITY SCORE: 22
CLIMB 3 TO 5 STEPS WITH RAILING: A LITTLE
DAILY ACTIVITIY SCORE: 23
WALKING IN HOSPITAL ROOM: A LITTLE

## 2025-01-10 ASSESSMENT — PAIN SCALES - WONG BAKER: WONGBAKER_NUMERICALRESPONSE: HURTS EVEN MORE

## 2025-01-10 ASSESSMENT — PAIN - FUNCTIONAL ASSESSMENT: PAIN_FUNCTIONAL_ASSESSMENT: 0-10

## 2025-01-10 ASSESSMENT — PAIN SCALES - GENERAL: PAINLEVEL_OUTOF10: 0 - NO PAIN

## 2025-01-10 ASSESSMENT — ACTIVITIES OF DAILY LIVING (ADL): LACK_OF_TRANSPORTATION: YES

## 2025-01-10 NOTE — PROGRESS NOTES
"Jonathan Ayala is a 49 y.o. female on day 0 of admission presenting with  (ventriculoperitoneal) shunt status.    Subjective   Feels like her vision is better, able to see facial features this AM    Objective     Physical Exam  A&Ox3  Face symmetric  RUE 5/5  LUE 5/5  RLE 5/5  LLE 5/5  Sensation intact to light touch throughout all extremities    Last Recorded Vitals  Blood pressure 116/64, pulse 81, temperature 35.6 °C (96.1 °F), resp. rate 20, height 1.575 m (5' 2.01\"), weight 116 kg (256 lb 13.4 oz), SpO2 93%.  Intake/Output last 3 Shifts:  I/O last 3 completed shifts:  In: 1270 (10.9 mL/kg) [NG/GT:270; IV Piggyback:1000]  Out: 1900 (16.3 mL/kg) [Urine:1900 (0.5 mL/kg/hr)]  Weight: 116.5 kg     Relevant Results      Assessment/Plan   Assessment & Plan   (ventriculoperitoneal) shunt status    49yF h/o IIH (dx 2/2022 w/ OP 25) s/p R frontal bolt c/b tract hemorrhage and focal epilepsy, right hemiplegic migraines, CVID on monthly IVIG infusions, Sjogren's syndrome/scleroderma, POTS, T2DM, HTN, hypothyroidism, BRENT on CPAP, anxiety/depression, 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 10/30 s/p R VPS exploration w abdominal catheter repositioning w improvement in distal runnoff, 11/6 p/w min clear incisional drainage, min decr in vision, incision oversewn with no further leakage noted, 11/15 she presented to the ED with fluid leaking from incision site and headache, 11/15 s/p RF shunt wound revision and fractured valve replacement, 12/9 p/w wound dehiscence/incisional leakage, CTH stable vents, SS is intact, 12/10 s/p R cranial wound exploration, washout, revision, 1/8 p/w double vision, nausea and vomiting, and 2 seizure events, CTH stable vents, SS intact certas at 5, CT AP no pseudocyst, s/p LP (OP 27), certas dialed to 4, routine EEG neg, vimpat increased.     Patient with improving vision and no headaches this AM.    PLAN  Observation   Possible discharge today " with close follow up     Jesus Lambert MD

## 2025-01-10 NOTE — CARE PLAN
The patient's goals for the shift include      The clinical goals for the shift include patient will be fall free and safely ambulated    Over the shift, the patient did not make progress toward the following goals. Barriers to progression include partial loss vision.. Recommendations to address these barriers include assist patient for ambulation

## 2025-01-10 NOTE — PROGRESS NOTES
"OPHTHALMOLOGY PROGRESS NOTE      Jonathan Ayala is a 49 y.o. female with PMH of left non-arteritic anterior ischemic optic neuropathy, bilateral sequential vision loss with right eye improvement 11/13/2019, idiopathic intracranial hypertension status post ventriculoperitoneal shunt , seizures, scleroderma, Sjogren, CVID on monthly IVIG, POTS, HTN, DM2, hypothyroidism, BRENT on CPAP, migraine  recently s/p  shunt revision on 10/30, s/p  shunt explantation and replacement of fractured valve and revision of cranial incision 11/15/24, admitted 1/8/2025 for headache, nausea, vomiting, seizures, and vision changes of right eye.     Subjective   Patient reports improved vision and one headache overnight. Still endorses intermittent monocular diplopia. Stable chronic tinnitus. Overall no new vision concerns.       Objective     Last Recorded Vitals  Blood pressure 128/84, pulse 87, temperature 35.8 °C (96.4 °F), temperature source Temporal, resp. rate 18, height 1.575 m (5' 2.01\"), weight 116 kg (256 lb 13.4 oz), SpO2 97%.    Physical Exam  Base Eye Exam       Visual Acuity (Abdelrahman Cards)         Right Left    Near cc 20/25-1 PH 20/20 20/40-1 PH 20/30-2      Correction: Glasses   +2.50 readers             Pupils         Dark Light Shape React APD    Right 6 5 Round Slow None    Left 6 5 Round Slow Trace APD              Visual Fields (Counting fingers)         Left Right                                Extraocular Movement         Right Left     Full Full              Neuro/Psych       Oriented x3: Yes    Mood/Affect: Normal                  Additional Tests       Color         Right Left    Ishihara 1/11 1/11                    Assessment/Plan     #Idiopathic intracranial hypertension  #NAION, both eyes   #S/p  shunt  #Monocular diplopia, right eye   - Jonathan Ayala is a 49 y.o. female with PMH of left non-arteritic anterior ischemic optic neuropathy, bilateral sequential vision loss with right eye improvement 11/13/2019, " idiopathic intracranial hypertension status post ventriculoperitoneal shunt , seizures, scleroderma, Sjogren, CVID on monthly IVIG, POTS, HTN, DM2, hypothyroidism, BRENT on CPAP, migraine  recently s/p  shunt revision on 10/30, s/p  shunt explantation and replacement of fractured valve and revision of cranial incision 11/15/24, presenting for headache, nausea, vomiting, seizures, and vision changes of right eye.      - Upon initial evaluation on 1/8, pt's symptoms were consistent with monocular diplopia, likely due to refractive error vs dry eye,  with stable visual acuity and absence of optic nerve edema.   - Neurosurgery consultation was recommended. Shunt studies normal and CTH without evidence of ventriculomegaly.   - Neurology on board for seizure management  - 1/8/25 LP performed, opening pressure 27, WBC 6, RBC 3000, protein 114, glucose 79. CSF cultures pending. Certas shunt dialed from 5 to 4.     Today patient's visual acuity continues to improve with stable visual fields and color deficit.     Recommend follow-up with neuro-ophthalmology Dr. Mederos in 1 month. Ophthalmology will sign off.     Discussed with Dr. Rob Mederos, neuro-ophthalmology attending.      Tobias Regalado MD  Ophthalmology PGY-2    Ophthalmology Adult Pager - [phone]  Ophthalmology Pediatrics Pager - [phone]     For adult follow-up appointments, call: [phone]  For pediatric follow-up appointments, call: [phone]    Note finalized upon attending signature.

## 2025-01-10 NOTE — SIGNIFICANT EVENT
Neurology sign off note    Jonathan Ayala is a 49 y.o. 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 The patient seizure description of right head version and left arm twitching with eyes closed, possibly epileptic, routine eeg normal without discharges.      Recommendations:  - Continue on Keppra 1500 twice daily   - continue lacosamide 300 mg twice daily.   -Patient should be instructed to follow with her [facility] epilepsy provider after discharge  - neurology will sign off  - seizure precautions on discharge as below  - pt discussed with attending     Do not to drive, use power tools or operate heavy machinery, and should not be on ladders. Use the shower and not the bath. Likewise, refrain from any activity which could result in injury to themselves or others if they had a seizure or lost consciousness. These restrictions should continue until instructed by a doctor to do otherwise.        Marco Arana MD PhD  PGY-4 Neurology

## 2025-01-10 NOTE — CARE PLAN
The patient's goals for the shift include  get some rest through the night.    The clinical goals for the shift include HDS through out the s  Problem: Respiratory  Goal: Minimize anxiety/maximize coping throughout shift  Outcome: Progressing     Problem: Pain - Adult  Goal: Verbalizes/displays adequate comfort level or baseline comfort level  Outcome: Progressing     Problem: Safety - Adult  Goal: Free from fall injury  Outcome: Progressing     Problem: Discharge Planning  Goal: Discharge to home or other facility with appropriate resources  Outcome: Progressing     Problem: Chronic Conditions and Co-morbidities  Goal: Patient's chronic conditions and co-morbidity symptoms are monitored and maintained or improved  Outcome: Progressing

## 2025-01-11 ENCOUNTER — PHARMACY VISIT (OUTPATIENT)
Dept: PHARMACY | Facility: CLINIC | Age: 50
End: 2025-01-11
Payer: MEDICAID

## 2025-01-11 VITALS
RESPIRATION RATE: 18 BRPM | HEART RATE: 81 BPM | DIASTOLIC BLOOD PRESSURE: 79 MMHG | HEIGHT: 62 IN | BODY MASS INDEX: 47.26 KG/M2 | WEIGHT: 256.84 LBS | OXYGEN SATURATION: 91 % | TEMPERATURE: 97.7 F | SYSTOLIC BLOOD PRESSURE: 127 MMHG

## 2025-01-11 LAB
BACTERIA CSF CULT: NORMAL
GLUCOSE BLD MANUAL STRIP-MCNC: 117 MG/DL (ref 74–99)
GRAM STN SPEC: NORMAL
GRAM STN SPEC: NORMAL

## 2025-01-11 PROCEDURE — 82947 ASSAY GLUCOSE BLOOD QUANT: CPT

## 2025-01-11 PROCEDURE — RXMED WILLOW AMBULATORY MEDICATION CHARGE

## 2025-01-11 PROCEDURE — 2500000001 HC RX 250 WO HCPCS SELF ADMINISTERED DRUGS (ALT 637 FOR MEDICARE OP): Mod: SE | Performed by: NEUROLOGICAL SURGERY

## 2025-01-11 PROCEDURE — 2500000001 HC RX 250 WO HCPCS SELF ADMINISTERED DRUGS (ALT 637 FOR MEDICARE OP): Mod: SE

## 2025-01-11 PROCEDURE — 2500000004 HC RX 250 GENERAL PHARMACY W/ HCPCS (ALT 636 FOR OP/ED): Mod: SE

## 2025-01-11 PROCEDURE — G0378 HOSPITAL OBSERVATION PER HR: HCPCS

## 2025-01-11 PROCEDURE — 2500000002 HC RX 250 W HCPCS SELF ADMINISTERED DRUGS (ALT 637 FOR MEDICARE OP, ALT 636 FOR OP/ED): Mod: SE

## 2025-01-11 PROCEDURE — 96372 THER/PROPH/DIAG INJ SC/IM: CPT

## 2025-01-11 PROCEDURE — 96375 TX/PRO/DX INJ NEW DRUG ADDON: CPT

## 2025-01-11 RX ORDER — HEPARIN 100 UNIT/ML
5 SYRINGE INTRAVENOUS AS NEEDED
Status: DISCONTINUED | OUTPATIENT
Start: 2025-01-11 | End: 2025-01-11 | Stop reason: HOSPADM

## 2025-01-11 RX ADMIN — POLYETHYLENE GLYCOL 3350 17 G: 17 POWDER, FOR SOLUTION ORAL at 08:38

## 2025-01-11 RX ADMIN — INSULIN GLARGINE 54 UNITS: 100 INJECTION, SOLUTION SUBCUTANEOUS at 08:47

## 2025-01-11 RX ADMIN — CLONAZEPAM 0.5 MG: 0.5 TABLET ORAL at 08:37

## 2025-01-11 RX ADMIN — LEVOTHYROXINE SODIUM 75 MCG: 75 TABLET ORAL at 06:21

## 2025-01-11 RX ADMIN — PRUCALOPRIDE 2 MG: 2 TABLET, FILM COATED ORAL at 08:46

## 2025-01-11 RX ADMIN — LEVETIRACETAM 1500 MG: 750 TABLET, FILM COATED ORAL at 08:38

## 2025-01-11 RX ADMIN — AZELASTINE HYDROCHLORIDE 1 SPRAY: 137 SPRAY, METERED NASAL at 08:38

## 2025-01-11 RX ADMIN — DIVALPROEX SODIUM 500 MG: 500 TABLET, FILM COATED, EXTENDED RELEASE ORAL at 08:37

## 2025-01-11 RX ADMIN — FUROSEMIDE 20 MG: 20 TABLET ORAL at 08:37

## 2025-01-11 RX ADMIN — EZETIMIBE 10 MG: 10 TABLET ORAL at 08:42

## 2025-01-11 RX ADMIN — ONDANSETRON 4 MG: 2 INJECTION INTRAMUSCULAR; INTRAVENOUS at 00:40

## 2025-01-11 RX ADMIN — HYDROCORTISONE 10 MG: 10 TABLET ORAL at 08:38

## 2025-01-11 RX ADMIN — FOLIC ACID 1 MG: 1 TABLET ORAL at 08:45

## 2025-01-11 RX ADMIN — Medication 5000 UNITS: at 08:38

## 2025-01-11 RX ADMIN — OXYCODONE 5 MG: 5 TABLET ORAL at 00:50

## 2025-01-11 RX ADMIN — PROPRANOLOL HYDROCHLORIDE 60 MG: 60 CAPSULE, EXTENDED RELEASE ORAL at 08:37

## 2025-01-11 RX ADMIN — FLUTICASONE PROPIONATE AND SALMETEROL XINAFOATE 2 PUFF: 230; 21 AEROSOL, METERED RESPIRATORY (INHALATION) at 07:00

## 2025-01-11 RX ADMIN — PANTOPRAZOLE SODIUM 40 MG: 40 TABLET, DELAYED RELEASE ORAL at 06:21

## 2025-01-11 RX ADMIN — HEPARIN 500 UNITS: 100 SYRINGE at 11:43

## 2025-01-11 RX ADMIN — LACOSAMIDE 300 MG: 100 TABLET, FILM COATED ORAL at 08:37

## 2025-01-11 RX ADMIN — HEPARIN SODIUM 7500 UNITS: 5000 INJECTION INTRAVENOUS; SUBCUTANEOUS at 06:21

## 2025-01-11 ASSESSMENT — PAIN SCALES - GENERAL
PAINLEVEL_OUTOF10: 8
PAINLEVEL_OUTOF10: 2

## 2025-01-11 ASSESSMENT — PAIN - FUNCTIONAL ASSESSMENT
PAIN_FUNCTIONAL_ASSESSMENT: 0-10
PAIN_FUNCTIONAL_ASSESSMENT: 0-10

## 2025-01-11 ASSESSMENT — PAIN SCALES - WONG BAKER: WONGBAKER_NUMERICALRESPONSE: HURTS LITTLE BIT

## 2025-01-11 ASSESSMENT — ACTIVITIES OF DAILY LIVING (ADL): LACK_OF_TRANSPORTATION: NO

## 2025-01-11 NOTE — DISCHARGE SUMMARY
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  On 1/8/25 she presented to the ED with double vision, nausea and vomiting, and 2 seizure events.   CTH stable vents, SS intact certas at 5, CT AP no pseudocyst, s/p LP (OP 27). She was then admitted to Neurosurgery for observation. 1/9 shunt dialed to certas 4, routine EEG neg. Ophthalmology consulted--> visual acuity continues to be stable. There is fluctuating color vision and visual field deficit compared to yesterday, however optic nerves continue to be flat.  Neurology consulted--> EEG placed; Lacosemide increased to 300 mg twice a day, continues on home Keppra. Will need to follow up outpatient with [facility] neurologist.   1/10 optho eval with visual acuity improvement with stable visual field cuts and color deficit, will need to follow up outpatient in 1 month with Dr. Mederos.     On the day of discharge, the patient was seen and evaluated by  the neurosurgery team and deemed suitable for discharge. The patient was given detailed discharge instructions and were scheduled to follow up as an outpatient.       Pertinent Physical Exam At Time of Discharge  Physical Exam  A&Ox3  Face symmetric  RUE 5/5  LUE 5/5  RLE 5/5  LLE 5/5  Sensation intact to light touch throughout all extremities  Sees shapes and light figures    Home Medications     Medication List      CHANGE how you take these medications     * lacosamide 50 mg tablet; Commonly known as: Vimpat; What changed:   Another medication with the same name was changed. Make sure you   understand how and when to take each.   * lacosamide 150 mg tablet tablet; Commonly known as: Vimpat; Take 2   tablets (300 mg) by mouth 2 times a day.; What changed: medication   strength, how much to take  * This list has 2 medication(s) that are the same as other medications   prescribed for you. Read the directions carefully, and ask your doctor or   other care provider to review them with you.     CONTINUE taking these medications     acetaminophen 325 mg tablet; Commonly known as: Tylenol   Advair -21 mcg/actuation inhaler; Generic drug: fluticasone   propion-salmeteroL   amitriptyline 100 mg tablet; Commonly known as: Elavil; Take 1 tablet   (100 mg) by mouth once daily at bedtime.   azelastine 137 mcg (0.1 %) nasal spray; Commonly known as: Astelin   * Baqsimi 3 mg/actuation spray,non-aerosol; Generic drug: glucagon; USE   ONE SPRAY IN THE NOSE AS NEEDED FOR LOW BLOOD SUGAR  MAY REPEAT AFTER 15   MINUTES USING A NEW DEVICE IF NO RESPONSE   * glucagon 1 mg injection; Commonly known as: Glucagen; Inject 1 mg   under the skin 1 time if needed for low blood sugar - see comments   (hypoglycemia).   carboxymethylcellulose 1 % ophthalmic solution dropperette; Commonly   known as: Refresh Celluvisc   cholecalciferol 5,000 Units tablet; Commonly known as: Vitamin D-3   Dexcom G7  misc; Generic drug: blood-glucose  meter,continuous   DEXCOM G7 SENSOR MISC   diclofenac 50 mg EC tablet; Commonly known as: Voltaren   dicyclomine 20 mg tablet; Commonly known as: Bentyl; Take 1 tablet (20   mg) by mouth 4 times a day as needed (spasm pain).   divalproex 500 mg 24 hr tablet; Commonly known as: Depakote ER   EPINEPHrine 0.3 mg/0.3 mL injection syringe; Commonly known as: Epipen;   INJECT INTRAMUSCULARLY ONCE AS NEEDED FOR ANAPHYLAXIS   ezetimibe 10 mg tablet; Commonly known as: Zetia; Take 1 tablet (10 mg)   by mouth once daily.   fluticasone 50 mcg/actuation nasal spray; Commonly known as: Flonase;   USE 1 SPRAY INTO EACH NOSTRIL ONCE DAILY.   folic acid 1 mg tablet; Commonly known as: Folvite; Take 1 tablet (1 mg)   by mouth once daily.   furosemide 20 mg tablet; Commonly known as: Lasix; Take 1 tablet (20 mg)   by mouth 2 times a day.   gloves, latex with aloe vera misc; Large size gloves   hydrocortisone 10 mg tablet; Commonly known as: Cortef; Take 1 tablet   (10 mg) by mouth 2 times a day. Double the dose if sick for three days   immune globulin (human) infusion; Commonly known as: Gammagard   insulin lispro 100 unit/mL injection; Commonly known as: HumaLOG KwikPen   Insulin; Use as directed to give up to 100 units a day   ipratropium-albuteroL 0.5-2.5 mg/3 mL nebulizer solution; Commonly known   as: Duo-Neb   KlonoPIN 0.5 mg tablet; Generic drug: clonazePAM   levETIRAcetam 750 mg tablet; Commonly known as: Keppra; Take 2 tablets   (1,500 mg) by mouth 2 times a day.   levocetirizine 5 mg tablet; Commonly known as: Xyzal   levothyroxine 50 mcg tablet; Commonly known as: Synthroid, Levoxyl; Take   1.5 tablets (75 mcg) by mouth once daily in the morning. Take before   meals.   * miconazole 2 % powder; Commonly known as: Micotin   * miconazole 2 % cream; Commonly known as: Micotin   miscellaneous medical supply misc; Bath Wipes  fragrance free   moisturizing mouth solution; Commonly known as: Biotene Oral Dry Mouth   montelukast 10  "mg tablet; Commonly known as: Singulair; TAKE ONE TABLET   BY MOUTH EVERY DAY AT BEDTIME   Motegrity 2 mg tablet; Generic drug: prucalopride   nasal spray Nayzilam 5 mg/spray (0.1 mL) spray,non-aerosol; Generic   drug: midazolam   ondansetron ODT 4 mg disintegrating tablet; Commonly known as:   Zofran-ODT; Take 1 tablet (4 mg) by mouth every 8 hours if needed for   nausea or vomiting.   OneTouch Delica Plus Lancet 33 gauge misc; Generic drug: lancets   OneTouch Verio test strips strip; Generic drug: blood sugar diagnostic   pen needle, diabetic 32 gauge x 5/32\" needle; Commonly known as: BD Lela   2nd Gen Pen Needle; Use as directed with insulin pen up to 5 times daily   polyethylene glycol 17 gram/dose powder; Commonly known as: Glycolax,   Miralax   propranolol LA 60 mg 24 hr capsule; Commonly known as: Inderal LA; Take   1 capsule (60 mg) by mouth once daily. Do not crush, chew, or split.   Protonix 40 mg EC tablet; Generic drug: pantoprazole   Reyvow 100 mg tablet; Generic drug: lasmiditan; Take 100 mg by mouth if   needed (migraine). Do not drive in 8 hours of taking this medicine.   Maximum one dose per 24 hours.   rOPINIRole 0.5 mg tablet; Commonly known as: Requip   senna 8.6 mg tablet; Generic drug: sennosides   tiZANidine 2 mg tablet; Commonly known as: Zanaflex   Toujeo Max U-300 SoloStar 300 unit/mL (3 mL) injection; Generic drug:   insulin glargine; Inject 54 units under the skin once daily   Ubrelvy 100 mg tablet tablet; Generic drug: ubrogepant; Take 1 tablet   (100 mg) by mouth if needed (migraine). May repeat in 2 hours for max of   200mg per 24 hours.   Viactiv 650 mg-12.5 mcg-40 mcg chewable tablet; Generic drug:   calcium-vitamin D3-vitamin K   ZINC ORAL  * This list has 4 medication(s) that are the same as other medications   prescribed for you. Read the directions carefully, and ask your doctor or   other care provider to review them with you.       Outpatient Follow-Up  Future Appointments "   Date Time Provider Department [place]   2/3/2025 11:30 AM Joanna Evans, LoydaD [id number] [facility]   2/5/2025  3:00 PM Avelino Tafoya MD [id number] [facility]   2/6/2025  3:45 PM Rob Mederos MD PhD [id number] [facility]   4/3/2025 12:00 PM Marah Felix MD [id number] [facility]   4/4/2025  3:15 PM Rob Mederos MD PhD [id number] [facility]       Jesus Lambert MD

## 2025-01-11 NOTE — PROGRESS NOTES
"Jonathan Ayala is a 49 y.o. female on day 0 of admission presenting with  (ventriculoperitoneal) shunt status.    Subjective   Improved headaches but continues to have vision issues    Objective     Physical Exam  A&Ox3  Face symmetric  RUE 5/5  LUE 5/5  RLE 5/5  LLE 5/5  Sensation intact to light touch throughout all extremities    Last Recorded Vitals  Blood pressure 127/79, pulse 81, temperature 36.5 °C (97.7 °F), resp. rate 18, height 1.575 m (5' 2.01\"), weight 116 kg (256 lb 13.4 oz), SpO2 91%.  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 10/30 s/p R VPS exploration w abdominal catheter repositioning w improvement in distal runnoff, 11/6 p/w min clear incisional drainage, min decr in vision, incision oversewn with no further leakage noted, 11/15 she presented to the ED with fluid leaking from incision site and headache, 11/15 s/p RF shunt wound revision and fractured valve replacement, 12/9 p/w wound dehiscence/incisional leakage, CTH stable vents, SS is intact, 12/10 s/p R cranial wound exploration, washout, revision, 1/8 p/w double vision, nausea and vomiting, and 2 seizure events, CTH stable vents, SS intact certas at 5, CT AP no pseudocyst, s/p LP (OP 27), certas dialed to 4, routine EEG neg, vimpat increased.    PLAN  Ok for discharge per neurology and ophtho, patient with improved headaches this morning. Will plan to discharge and follow up as " needed.    Jesus Lambert MD

## 2025-01-11 NOTE — CARE PLAN
The patient's goals for the shift include      The clinical goals for the shift include Pt's neuro exam will remain stable overnight    Problem: Respiratory  Goal: Minimize anxiety/maximize coping throughout shift  Outcome: Progressing     Problem: Pain - Adult  Goal: Verbalizes/displays adequate comfort level or baseline comfort level  Outcome: Progressing     Problem: Safety - Adult  Goal: Free from fall injury  Outcome: Progressing     Problem: Discharge Planning  Goal: Discharge to home or other facility with appropriate resources  Outcome: Progressing     Problem: Chronic Conditions and Co-morbidities  Goal: Patient's chronic conditions and co-morbidity symptoms are monitored and maintained or improved  Outcome: Progressing

## 2025-01-11 NOTE — PROGRESS NOTES
01/11/25 1028   Discharge Planning   Living Arrangements Spouse/significant other   Support Systems Spouse/significant other   Assistance Needed HHA and  assist with ADLs iADLs.   Type of Residence Private residence   Number of Stairs to Enter Residence 2   Number of Stairs Within Residence 0   Do you have animals or pets at home? Yes   Type of Animals or Pets 1 dog   Who is requesting discharge planning? Provider   Home or Post Acute Services In home services   Type of Home Care Services Home health aide   Expected Discharge Disposition Home Health   Does the patient need discharge transport arranged? No   Financial Resource Strain   How hard is it for you to pay for the very basics like food, housing, medical care, and heating? Somewhat   Transportation Needs   In the past 12 months, has lack of transportation kept you from medical appointments or from getting medications? no   In the past 12 months, has lack of transportation kept you from meetings, work, or from getting things needed for daily living? No     Met with pt and introduced myself as Care Coordinator with Care Transitions Team for Discharge Planning. Pt stated she feels safe at home. Pts  drives to drs appts. Pt denies need for assistance with Sikh or cultural practices. Pt's address, phone number and contact information was verified. Pt requesting to speak with SW regarding assistance with food and bills. SW assigned notified.  [facility]  DME: cane, walker, shower chair, quad cane, bedside commode  PCP:  MD Manning  Pharmacy: [facility]  Per pt she has a shower chair at home that's old, requesting a new one, this nurse requested that MD Melissa provide pt with script for shower chair.     Addendum: Shower chair script provided to pt. MD Faria aware that she has questions regarding her discharge.

## 2025-01-13 ENCOUNTER — PATIENT OUTREACH (OUTPATIENT)
Dept: PRIMARY CARE | Facility: CLINIC | Age: 50
End: 2025-01-13
Payer: MEDICAID

## 2025-01-13 ENCOUNTER — TELEPHONE (OUTPATIENT)
Dept: PRIMARY CARE | Facility: CLINIC | Age: 50
End: 2025-01-13
Payer: MEDICAID

## 2025-01-13 NOTE — TELEPHONE ENCOUNTER
[facility] called to let you know that she was discharged and she was out to see her at home visit and the patient ate a salad and and then she get sick and they visiting nurse was not sure if it was food poisoning or something bad in the salad just wanted to give up dated

## 2025-01-14 ENCOUNTER — PATIENT OUTREACH (OUTPATIENT)
Dept: PRIMARY CARE | Facility: CLINIC | Age: 50
End: 2025-01-14
Payer: MEDICAID

## 2025-01-14 ENCOUNTER — TELEPHONE (OUTPATIENT)
Dept: PRIMARY CARE | Facility: CLINIC | Age: 50
End: 2025-01-14
Payer: MEDICAID

## 2025-01-14 NOTE — TELEPHONE ENCOUNTER
----- Message from Forest Fuentes sent at 1/14/2025  8:43 AM EST -----  Regarding: No contact on discharge outreach after 2 attempts.  Discharge facility: [facility]  Discharge diagnosis:  (ventriculoperitoneal) shunt status/Seizure     Date of discharge: 11 Jan 25        Unsuccessful attempts x2 to reach patient for PCP Follow-up  Please have office staff reach out to patient and schedule an appointment within 14 days from discharge date.

## 2025-01-14 NOTE — PROGRESS NOTES
Discharge Facility: [facility]  Discharge Diagnosis:  (ventriculoperitoneal) shunt status/Seizure  Admission Date: 8 Jan 25  Discharge Date: 11 Jan 25    PCP Appointment Date: Tasked to office  Specialist Appointment Date: 22 Jan 25 (Neurology, Garo uZleta @ [facility])  Hospital Encounter and Summary Linked: Yes    No contact on discharge outreach after 2 attempts. Tasked to office for scheduling. Will attempt outreach again in 2 wks.

## 2025-01-15 LAB
BACTERIA CSF CULT: NORMAL
GRAM STN SPEC: NORMAL
GRAM STN SPEC: NORMAL

## 2025-01-15 PROCEDURE — RXMED WILLOW AMBULATORY MEDICATION CHARGE

## 2025-01-16 ENCOUNTER — CLINICAL SUPPORT (OUTPATIENT)
Dept: EMERGENCY MEDICINE | Facility: HOSPITAL | Age: 50
End: 2025-01-16
Payer: MEDICAID

## 2025-01-16 ENCOUNTER — HOSPITAL ENCOUNTER (EMERGENCY)
Facility: HOSPITAL | Age: 50
Discharge: HOME | End: 2025-01-16
Payer: MEDICAID

## 2025-01-16 ENCOUNTER — HOSPITAL ENCOUNTER (EMERGENCY)
Facility: HOSPITAL | Age: 50
Discharge: HOME | End: 2025-01-16
Attending: EMERGENCY MEDICINE
Payer: MEDICAID

## 2025-01-16 ENCOUNTER — HOSPITAL ENCOUNTER (EMERGENCY)
Facility: HOSPITAL | Age: 50
End: 2025-01-16
Payer: MEDICAID

## 2025-01-16 ENCOUNTER — PHARMACY VISIT (OUTPATIENT)
Dept: PHARMACY | Facility: CLINIC | Age: 50
End: 2025-01-16
Payer: MEDICAID

## 2025-01-16 ENCOUNTER — APPOINTMENT (OUTPATIENT)
Dept: RADIOLOGY | Facility: HOSPITAL | Age: 50
End: 2025-01-16
Payer: MEDICAID

## 2025-01-16 VITALS
SYSTOLIC BLOOD PRESSURE: 121 MMHG | HEART RATE: 99 BPM | DIASTOLIC BLOOD PRESSURE: 69 MMHG | BODY MASS INDEX: 46.01 KG/M2 | WEIGHT: 250 LBS | HEIGHT: 62 IN | TEMPERATURE: 98.1 F | OXYGEN SATURATION: 94 % | RESPIRATION RATE: 14 BRPM

## 2025-01-16 VITALS
BODY MASS INDEX: 46.01 KG/M2 | TEMPERATURE: 98 F | HEIGHT: 62 IN | HEART RATE: 97 BPM | SYSTOLIC BLOOD PRESSURE: 138 MMHG | DIASTOLIC BLOOD PRESSURE: 85 MMHG | OXYGEN SATURATION: 96 % | RESPIRATION RATE: 18 BRPM | WEIGHT: 250 LBS

## 2025-01-16 DIAGNOSIS — R51.9 INTRACTABLE HEADACHE, UNSPECIFIED CHRONICITY PATTERN, UNSPECIFIED HEADACHE TYPE: Primary | ICD-10-CM

## 2025-01-16 LAB
ALBUMIN SERPL BCP-MCNC: 3.6 G/DL (ref 3.4–5)
ALP SERPL-CCNC: 91 U/L (ref 33–110)
ALT SERPL W P-5'-P-CCNC: 25 U/L (ref 7–45)
ANION GAP SERPL CALC-SCNC: 12 MMOL/L (ref 10–20)
AST SERPL W P-5'-P-CCNC: 17 U/L (ref 9–39)
B-HCG SERPL-ACNC: 3 MIU/ML
BASOPHILS # BLD AUTO: 0.02 X10*3/UL (ref 0–0.1)
BASOPHILS NFR BLD AUTO: 0.7 %
BILIRUB SERPL-MCNC: 0.3 MG/DL (ref 0–1.2)
BUN SERPL-MCNC: 11 MG/DL (ref 6–23)
CALCIUM SERPL-MCNC: 8.1 MG/DL (ref 8.6–10.6)
CHLORIDE SERPL-SCNC: 106 MMOL/L (ref 98–107)
CO2 SERPL-SCNC: 25 MMOL/L (ref 21–32)
CREAT SERPL-MCNC: 0.73 MG/DL (ref 0.5–1.05)
EGFRCR SERPLBLD CKD-EPI 2021: >90 ML/MIN/1.73M*2
EOSINOPHIL # BLD AUTO: 0.04 X10*3/UL (ref 0–0.7)
EOSINOPHIL NFR BLD AUTO: 1.4 %
ERYTHROCYTE [DISTWIDTH] IN BLOOD BY AUTOMATED COUNT: 14.9 % (ref 11.5–14.5)
GLUCOSE BLD MANUAL STRIP-MCNC: 181 MG/DL (ref 74–99)
GLUCOSE SERPL-MCNC: 160 MG/DL (ref 74–99)
HCT VFR BLD AUTO: 33.1 % (ref 36–46)
HGB BLD-MCNC: 11 G/DL (ref 12–16)
IMM GRANULOCYTES # BLD AUTO: 0 X10*3/UL (ref 0–0.7)
IMM GRANULOCYTES NFR BLD AUTO: 0 % (ref 0–0.9)
LIPASE SERPL-CCNC: 26 U/L (ref 9–82)
LYMPHOCYTES # BLD AUTO: 1.43 X10*3/UL (ref 1.2–4.8)
LYMPHOCYTES NFR BLD AUTO: 49.5 %
MCH RBC QN AUTO: 27.2 PG (ref 26–34)
MCHC RBC AUTO-ENTMCNC: 33.2 G/DL (ref 32–36)
MCV RBC AUTO: 82 FL (ref 80–100)
MONOCYTES # BLD AUTO: 0.26 X10*3/UL (ref 0.1–1)
MONOCYTES NFR BLD AUTO: 9 %
NEUTROPHILS # BLD AUTO: 1.14 X10*3/UL (ref 1.2–7.7)
NEUTROPHILS NFR BLD AUTO: 39.4 %
NRBC BLD-RTO: 0 /100 WBCS (ref 0–0)
PLATELET # BLD AUTO: 242 X10*3/UL (ref 150–450)
POTASSIUM SERPL-SCNC: 3.3 MMOL/L (ref 3.5–5.3)
PROT SERPL-MCNC: 6.8 G/DL (ref 6.4–8.2)
RBC # BLD AUTO: 4.04 X10*6/UL (ref 4–5.2)
SODIUM SERPL-SCNC: 140 MMOL/L (ref 136–145)
WBC # BLD AUTO: 2.9 X10*3/UL (ref 4.4–11.3)

## 2025-01-16 PROCEDURE — 74018 RADEX ABDOMEN 1 VIEW: CPT | Mod: FOREIGN READ | Performed by: RADIOLOGY

## 2025-01-16 PROCEDURE — 93005 ELECTROCARDIOGRAM TRACING: CPT

## 2025-01-16 PROCEDURE — 2500000004 HC RX 250 GENERAL PHARMACY W/ HCPCS (ALT 636 FOR OP/ED): Mod: SE

## 2025-01-16 PROCEDURE — 96365 THER/PROPH/DIAG IV INF INIT: CPT

## 2025-01-16 PROCEDURE — 4500999001 HC ED NO CHARGE

## 2025-01-16 PROCEDURE — 70450 CT HEAD/BRAIN W/O DYE: CPT

## 2025-01-16 PROCEDURE — 84702 CHORIONIC GONADOTROPIN TEST: CPT

## 2025-01-16 PROCEDURE — 80053 COMPREHEN METABOLIC PANEL: CPT | Performed by: STUDENT IN AN ORGANIZED HEALTH CARE EDUCATION/TRAINING PROGRAM

## 2025-01-16 PROCEDURE — 96366 THER/PROPH/DIAG IV INF ADDON: CPT

## 2025-01-16 PROCEDURE — 70250 X-RAY EXAM OF SKULL: CPT | Mod: FOREIGN READ | Performed by: RADIOLOGY

## 2025-01-16 PROCEDURE — 70450 CT HEAD/BRAIN W/O DYE: CPT | Performed by: RADIOLOGY

## 2025-01-16 PROCEDURE — 85025 COMPLETE CBC W/AUTO DIFF WBC: CPT | Performed by: STUDENT IN AN ORGANIZED HEALTH CARE EDUCATION/TRAINING PROGRAM

## 2025-01-16 PROCEDURE — 96367 TX/PROPH/DG ADDL SEQ IV INF: CPT

## 2025-01-16 PROCEDURE — 70250 X-RAY EXAM OF SKULL: CPT

## 2025-01-16 PROCEDURE — 83690 ASSAY OF LIPASE: CPT

## 2025-01-16 PROCEDURE — 71045 X-RAY EXAM CHEST 1 VIEW: CPT | Mod: FOREIGN READ | Performed by: RADIOLOGY

## 2025-01-16 PROCEDURE — 99281 EMR DPT VST MAYX REQ PHY/QHP: CPT

## 2025-01-16 PROCEDURE — 99284 EMERGENCY DEPT VISIT MOD MDM: CPT

## 2025-01-16 PROCEDURE — 99285 EMERGENCY DEPT VISIT HI MDM: CPT | Performed by: EMERGENCY MEDICINE

## 2025-01-16 PROCEDURE — 96375 TX/PRO/DX INJ NEW DRUG ADDON: CPT

## 2025-01-16 PROCEDURE — 2500000005 HC RX 250 GENERAL PHARMACY W/O HCPCS: Mod: SE

## 2025-01-16 PROCEDURE — 99285 EMERGENCY DEPT VISIT HI MDM: CPT | Mod: 25 | Performed by: EMERGENCY MEDICINE

## 2025-01-16 PROCEDURE — 82947 ASSAY GLUCOSE BLOOD QUANT: CPT

## 2025-01-16 RX ORDER — DEXAMETHASONE SODIUM PHOSPHATE 10 MG/ML
10 INJECTION INTRAMUSCULAR; INTRAVENOUS ONCE
Status: COMPLETED | OUTPATIENT
Start: 2025-01-16 | End: 2025-01-16

## 2025-01-16 RX ORDER — MAGNESIUM SULFATE 1 G/100ML
1 INJECTION INTRAVENOUS ONCE
Status: COMPLETED | OUTPATIENT
Start: 2025-01-16 | End: 2025-01-16

## 2025-01-16 RX ORDER — KETOROLAC TROMETHAMINE 15 MG/ML
15 INJECTION, SOLUTION INTRAMUSCULAR; INTRAVENOUS ONCE
Status: COMPLETED | OUTPATIENT
Start: 2025-01-16 | End: 2025-01-16

## 2025-01-16 RX ORDER — HEPARIN 100 UNIT/ML
SYRINGE INTRAVENOUS
Status: COMPLETED
Start: 2025-01-16 | End: 2025-01-16

## 2025-01-16 RX ORDER — HEPARIN 100 UNIT/ML
5 SYRINGE INTRAVENOUS AS NEEDED
Status: DISCONTINUED | OUTPATIENT
Start: 2025-01-16 | End: 2025-01-16 | Stop reason: HOSPADM

## 2025-01-16 RX ORDER — DIPHENHYDRAMINE HYDROCHLORIDE 50 MG/ML
50 INJECTION INTRAMUSCULAR; INTRAVENOUS ONCE
Status: COMPLETED | OUTPATIENT
Start: 2025-01-16 | End: 2025-01-16

## 2025-01-16 RX ADMIN — MAGNESIUM SULFATE HEPTAHYDRATE 1 G: 1 INJECTION, SOLUTION INTRAVENOUS at 12:49

## 2025-01-16 RX ADMIN — KETOROLAC TROMETHAMINE 15 MG: 15 INJECTION, SOLUTION INTRAMUSCULAR; INTRAVENOUS at 12:54

## 2025-01-16 RX ADMIN — DEXTROSE MONOHYDRATE 1000 MG: 50 INJECTION, SOLUTION INTRAVENOUS at 15:20

## 2025-01-16 RX ADMIN — HEPARIN 500 UNITS: 100 SYRINGE at 20:20

## 2025-01-16 RX ADMIN — DIPHENHYDRAMINE HYDROCHLORIDE 50 MG: 50 INJECTION INTRAMUSCULAR; INTRAVENOUS at 12:54

## 2025-01-16 RX ADMIN — DEXAMETHASONE SODIUM PHOSPHATE 10 MG: 10 INJECTION INTRAMUSCULAR; INTRAVENOUS at 15:15

## 2025-01-16 RX ADMIN — SODIUM CHLORIDE 1000 ML: 9 INJECTION, SOLUTION INTRAVENOUS at 12:49

## 2025-01-16 ASSESSMENT — PAIN - FUNCTIONAL ASSESSMENT
PAIN_FUNCTIONAL_ASSESSMENT: 0-10
PAIN_FUNCTIONAL_ASSESSMENT: 0-10

## 2025-01-16 ASSESSMENT — LIFESTYLE VARIABLES
EVER HAD A DRINK FIRST THING IN THE MORNING TO STEADY YOUR NERVES TO GET RID OF A HANGOVER: NO
TOTAL SCORE: 0
HAVE YOU EVER FELT YOU SHOULD CUT DOWN ON YOUR DRINKING: NO
HAVE PEOPLE ANNOYED YOU BY CRITICIZING YOUR DRINKING: NO
EVER FELT BAD OR GUILTY ABOUT YOUR DRINKING: NO

## 2025-01-16 ASSESSMENT — COLUMBIA-SUICIDE SEVERITY RATING SCALE - C-SSRS
2. HAVE YOU ACTUALLY HAD ANY THOUGHTS OF KILLING YOURSELF?: NO
2. HAVE YOU ACTUALLY HAD ANY THOUGHTS OF KILLING YOURSELF?: NO
1. IN THE PAST MONTH, HAVE YOU WISHED YOU WERE DEAD OR WISHED YOU COULD GO TO SLEEP AND NOT WAKE UP?: NO
6. HAVE YOU EVER DONE ANYTHING, STARTED TO DO ANYTHING, OR PREPARED TO DO ANYTHING TO END YOUR LIFE?: NO
6. HAVE YOU EVER DONE ANYTHING, STARTED TO DO ANYTHING, OR PREPARED TO DO ANYTHING TO END YOUR LIFE?: NO
1. IN THE PAST MONTH, HAVE YOU WISHED YOU WERE DEAD OR WISHED YOU COULD GO TO SLEEP AND NOT WAKE UP?: NO

## 2025-01-16 ASSESSMENT — PAIN SCALES - GENERAL
PAINLEVEL_OUTOF10: 8
PAINLEVEL_OUTOF10: 8

## 2025-01-16 ASSESSMENT — PAIN DESCRIPTION - PAIN TYPE: TYPE: ACUTE PAIN

## 2025-01-16 ASSESSMENT — PAIN DESCRIPTION - LOCATION: LOCATION: OTHER (COMMENT)

## 2025-01-16 NOTE — ED TRIAGE NOTES
See previous note. Pt was mid work up and was removed from the system. Pt put back into system. Port accessed, revitalized, and labs resulted.

## 2025-01-16 NOTE — ED TRIAGE NOTES
Patient reports nausea, vomiting, diarrhea since Sunday. Today she was getting up out of her bed to use the bathroom and fell out of bed. Patient is unsure if she hit her head, not on blood thinners. She is reporting a headache, right shoulder and hip pain. She is ambulatory in the lobby. 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

## 2025-01-16 NOTE — ED PROVIDER NOTES
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  She reports symptoms started on Sunday.  Since then she has had intermittent episodes of nausea, vomiting and diarrhea.  She thinks she ate something wrong.  Given her shunt, she decided to come in today to get checked out.  Did have a fall last evening in which she rolled out of bed.  Probably hit her head, does not remember the incident.  Unknown loss of consciousness.  Is not taking any blood thinners.  On arrival to emergency department she is mentating appropriately and oriented x 4.  Denies fevers, chills, cough, congestion, runny nose, chest pain, shortness of breath, or changes in urination/stool.  Does have right lower quadrant pain that she reports baseline and not worse than usual.    Records Reviewed: Recent available ED and inpatient notes reviewed in EMR.    PMHx/PSHx:  Per HPI.   - has a past medical history of Abnormal findings on diagnostic imaging of other abdominal regions, including retroperitoneum, Acquired deformity of nose, Acute upper respiratory infection, unspecified, Adrenal disease (Multi), Allergic, Allergy status to unspecified drugs, medicaments and biological substances, Allergy status to unspecified drugs, medicaments and biological substances, Anemia, Anxiety, Asthma, Benign intracranial hypertension, Bipolar disorder, unspecified (Multi), Breast calcification,  right, Cellulitis of abdominal wall, Cervicalgia, Chronic maxillary sinusitis, Chronic sialoadenitis, COVID-19, Decreased white blood cell count, unspecified, Disease of thyroid gland, Disturbances of salivary secretion, Dry eye syndrome of bilateral lacrimal glands, Encounter for preprocedural cardiovascular examination, Food additives allergy status, Fracture of nasal bones, initial encounter for closed fracture, GERD (gastroesophageal reflux disease), Granuloma of right orbit, History of endometrial ablation, Hyperlipidemia, Hypertension, Hyperthyroidism, Hypoglycemia, Hypothyroidism, Immunocompromised, Localized swelling, mass and lump, head, Major depressive disorder, recurrent, in full remission (CMS-HCC), Mammary duct ectasia of left breast, Meningitis ([facility]), Migraine, Nipple discharge, Ocular pain, right eye, Optic atrophy, Other abnormal and inconclusive findings on diagnostic imaging of breast, Other anomalies of pupillary function, Other chest pain, Other conditions influencing health status, Other conditions influencing health status, Other specified disorders of eustachian tube, left ear, Other specified disorders of nose and nasal sinuses, Other specified disorders of nose and nasal sinuses, Pelvic and perineal pain, Personal history of other diseases of the circulatory system, Personal history of other diseases of the circulatory system, Personal history of other diseases of the circulatory system, Personal history of other diseases of the digestive system, Personal history of other diseases of the digestive system, Personal history of other diseases of the musculoskeletal system and connective tissue, Personal history of other diseases of the musculoskeletal system and connective tissue, Personal history of other diseases of the musculoskeletal system and connective tissue, Personal history of other diseases of the nervous system and sense organs, Personal history of other diseases of the nervous  system and sense organs, Personal history of other diseases of the respiratory system, Personal history of other endocrine, nutritional and metabolic disease, Personal history of other mental and behavioral disorders, Personal history of other specified conditions, Personal history of other specified conditions, Personal history of other specified conditions, Personal history of other specified conditions, Personal history of other specified conditions, Personal history of other specified conditions, Personal history of other specified conditions, Personal history of other specified conditions, Personal history of other specified conditions, Personal history of other specified conditions, Personal history of other specified conditions, Personal history of urinary calculi, Polycystic ovary syndrome, Postural orthostatic tachycardia syndrome (POTS), Rash and other nonspecific skin eruption, Repeated falls, Right lower quadrant pain, Seizures (Multi), Sjogren syndrome, unspecified (Multi), Sjogren's syndrome, Sleep apnea, Slow transit constipation, Subarachnoid hemorrhage, traumatic (Multi), Thyroid nodule, Traumatic subarachnoid hemorrhage with loss of consciousness of unspecified duration, subsequent encounter, Traumatic subarachnoid hemorrhage without loss of consciousness, subsequent encounter, Type 2 diabetes mellitus, Unspecified disorder of refraction, Unspecified optic neuritis, Unspecified optic neuritis, Unspecified visual loss, Varicella, Venous insufficiency (chronic) (peripheral), Viral infection, unspecified, Vitamin D deficiency, and Vitamin D deficiency, unspecified.  - has a past surgical history that includes Other surgical history (08/22/2019); Other surgical history (08/22/2019); Other surgical history (08/22/2019); Other surgical history (08/22/2019); Other surgical history (08/22/2019); Other surgical history (08/22/2019); MR angio neck wo IV contrast (02/08/2021); MR angio head wo IV contrast  (02/08/2021); [place] tooth extraction (2004); Appendectomy (2017); Hysterectomy (2017); Hernia repair (Right, 03/01/2024); Cardiac catheterization; Cholecystectomy; Fracture surgery (12/2023); Endometrial ablation; and Brain surgery ([place] placement to measure pressure).  - has CVID (common variable immunodeficiency); Major depressive disorder, recurrent, in full remission with anxious distress (CMS-HCC); Benign essential hypertension; Complicated migraine; Diabetic gastroparesis associated with type 2 diabetes mellitus (Multi); S/P balloon dilatation of esophageal stricture; Dyslipidemia, goal LDL below 100; Insomnia; Irritable bowel syndrome with diarrhea; Hypothyroidism; Lumbar radiculopathy; Thyroid nodule; Lung nodule; Moderate persistent allergic asthma (HHS-HCC); Class 3 severe obesity due to excess calories with serious comorbidity and body mass index (BMI) of 40.0 to 44.9 in adult; NAION (non-arteritic anterior ischemic optic neuropathy), left eye; BRENT on CPAP; Raynaud's phenomenon without gangrene; 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    Medications:  Current Outpatient Medications   Medication Instructions    acetaminophen (TYLENOL) 325-650 mg, Every 6 hours PRN    Advair -21 mcg/actuation inhaler INHALE TWO PUFFS BY MOUTH AS INSTRUCTED TWO TIMES A DAY.    amitriptyline (ELAVIL) 100 mg, oral, Nightly    azelastine (Astelin) 137 mcg  (0.1 %) nasal spray 1 spray, 2 times daily    blood-glucose sensor (DEXCOM G7 SENSOR MISC) Use to check blood sugar continuously throughout the day as directed. Change sensor every 10 days.    calcium-vitamin D3-vitamin K (Viactiv) 650 mg-12.5 mcg-40 mcg chewable tablet 2 tablets, Daily    carboxymethylcellulose (Refresh Celluvisc) 1 % ophthalmic solution dropperette 2 drops, 3 times daily PRN    cholecalciferol (Vitamin D-3) 5,000 Units tablet 1 tablet, Daily    clonazePAM (KlonoPIN) 0.5 mg tablet 1 tablet, 2 times daily    Dexcom G7  misc Use as instructed to check blood sugars continuously throughout the day    diclofenac (VOLTAREN) 50 mg, oral, 3 times daily PRN    dicyclomine (BENTYL) 20 mg, oral, 4 times daily PRN    divalproex (Depakote ER) 500 mg 24 hr tablet 1 tablet, 2 times daily    EPINEPHrine 0.3 mg/0.3 mL injection syringe INJECT INTRAMUSCULARLY ONCE AS NEEDED FOR ANAPHYLAXIS    ezetimibe (ZETIA) 10 mg, oral, Daily    fluticasone (Flonase) 50 mcg/actuation nasal spray USE 1 SPRAY INTO EACH NOSTRIL ONCE DAILY.    folic acid (FOLVITE) 1 mg, oral, Daily    furosemide (LASIX) 20 mg, oral, 2 times daily    gloves, latex with aloe vera misc Large size gloves    glucagon (Baqsimi) 3 mg/actuation spray,non-aerosol USE ONE SPRAY IN THE NOSE AS NEEDED FOR LOW BLOOD SUGAR  MAY REPEAT AFTER 15 MINUTES USING A NEW DEVICE IF NO RESPONSE    glucagon (GLUCAGEN) 1 mg, subcutaneous, Once as needed    hydrocortisone (CORTEF) 10 mg, oral, 2 times daily, Double the dose if sick for three days    immune globulin, human, (Gammagard) infusion Infuse 600 mL (60 g) into a venous catheter every 14 (fourteen) days.    insulin glargine (Toujeo Max Solostar- 2 unit dial) 300 unit/mL (3 mL) injection Inject 54 units under the skin once daily    insulin lispro (HumaLOG KwikPen Insulin) 100 unit/mL injection Use as directed to give up to 100 units a day    ipratropium-albuteroL (Duo-Neb) 0.5-2.5 mg/3 mL nebulizer solution 3  "mL, 4 times daily PRN    lacosamide (Vimpat) 50 mg tablet Take 1 tablet (50 mg) by mouth every 12 hours.    lacosamide (VIMPAT) 300 mg, oral, 2 times daily    levETIRAcetam (KEPPRA) 1,500 mg, oral, 2 times daily    levocetirizine (Xyzal) 5 mg tablet 1 tablet, oral, Daily (0630)    levothyroxine (SYNTHROID, LEVOXYL) 75 mcg, oral, Daily before breakfast    miconazole (Micotin) 2 % cream 1 Application, Daily PRN    miconazole (Micotin) 2 % powder Apply to groin , under the breast and skin folds daily    miscellaneous medical supply [facility]  fragrance free    moisturizing mouth (Biotene Oral Dry Mouth) solution 1 spray, 4 times daily PRN    montelukast (SINGULAIR) 10 mg, oral, Nightly    Motegrity 2 mg tablet 1 tablet, Daily    nasal spray Nayzilam 5 mg/spray (0.1 mL) spray,non-aerosol nasal, Once    ondansetron ODT (ZOFRAN-ODT) 4 mg, oral, Every 8 hours PRN    OneTouch Delica Plus Lancet 33 gauge misc USE 1 LANCET TO CHECK GLUCOSE ONCE DAILY AS DIRECTED    OneTouch Verio test strips strip USE 1 STRIP TO CHECK GLUCOSE ONCE DAILY AS DIRECTED    pantoprazole (PROTONIX) 40 mg, 2 times daily before meals    pen needle, diabetic (BD Lela 2nd Gen Pen Needle) 32 gauge x 5/32\" needle Use as directed with insulin pen up to 5 times daily    polyethylene glycol (GLYCOLAX, MIRALAX) 17 g, oral, 3 times daily PRN    propranolol LA (INDERAL LA) 60 mg, oral, Daily, Do not crush, chew, or split.    Reyvow 100 mg, oral, As needed, Do not drive in 8 hours of taking this medicine. Maximum one dose per 24 hours.    rOPINIRole (Requip) 0.5 mg tablet Take 1 tablet by mouth (0.5mg) in the morning and 2 tablets (1mg) by mouth in the evening    senna 8.6 mg tablet 1-2 tablets, Nightly PRN    tiZANidine (ZANAFLEX) 2 mg, Every 8 hours PRN    Ubrelvy 100 mg, oral, As needed, May repeat in 2 hours for max of 200mg per 24 hours.    ZINC ORAL 50 mg, Daily        Allergies:  Acetazolamide, Atorvastatin, Cefdinir, Ceftriaxone, Doxepin, " Duloxetine, Fd and c red no.40, Levofloxacin, Levofloxacin in d5w, Nutritional supplements, Ozempic [semaglutide], Prochlorperazine, Red dye, Rosemary, Rosemary oil, Strawberry, Sulfa (sulfonamide antibiotics), Topiramate, Tree pollen-black walnut, Tree pollen-pecan, [place], Aripiprazole, Aspartame, Aspartame (bulk), Fenofibrate, Gluten, Hydrochlorothiazide, Hydromorphone, Iron, Meclizine, Metformin, Propoxyphene, Statins-hmg-coa reductase inhibitors, Tetracyclines, Thiazides, Venlafaxine, Wheat, Adhesive tape-silicones, Barbiturates, Ciprofloxacin, Dhe, Diet foods, Farxiga [dapagliflozin], Ferrous sulfate, Keflex [cephalexin], L.acidoph-l.bulg-b.bif-s.therm, Nsaids (non-steroidal anti-inflammatory drug), Other, Sulfonylureas, Tobramycin, Vancomycin, Adhesive, Azithromycin, Betamethasone, House dust, Metoclopramide hcl, Prednisone, Propoxyphene-acetaminophen, Sulfacetamide sodium, Oaxedgkz-6-jx1 antimigraine agents, and Zolpidem    Social History:  - Tobacco:  reports that she has never smoked. She has never used smokeless tobacco.   - Alcohol:  reports that she does not currently use alcohol.   - Illicit Drugs:  reports current drug use. Drug: Benzodiazepines.     ROS:  Per HPI.       Physical Exam     Triage Vitals:  T 36.9 °C (98.4 °F)  HR 97  /84  RR 18  O2 94 % None (Room air)    General: Awake, alert, in no acute distress  Eyes: Blind  HENT: Normo-cephalic, atraumatic. No stridor  CV: Regular rate, regular rhythm. Radial pulses 2+ bilaterally  Resp: Breathing non-labored, speaking in full sentences.  Clear to auscultation bilaterally  GI: Soft, non-distended, non-tender. No rebound or guarding.Mild RLQ pain, no rebound or guarding.  MSK/Extremities: No gross bony deformities. Moving all extremities  Skin: Warm. Appropriate color  Neuro: Alert. Oriented. Face symmetric. Speech is fluent.  Gross strength and sensation intact in b/l UE and LEs  Psych: Appropriate mood and affect          Jay Coma  Scale Score: 15                    Medical Decision Making & ED Course     EKG: EKG interpreted by myself. Please see ED Course for full interpretation.    Medical Decision Making   Jonathan Ayala is a 49 y.o.female presenting to the Emergency Department for headache, nausea, vomiting, diarrhea.  On arrival, blood pressure 126/84, rest of vital signs within normal limits.  Afebrile for us.  On examination, patient appears otherwise clinical well.  Clear lung sounds bilaterally.  Mild right lower quadrant of patient without rebound or guarding.  Lower concern for acute peritonitis today.  Patient reports her abdominal pain is at her baseline.  Given her symptoms today we will get basic labs, lipase, and symptomatically treat with headache cocktail including fluids, Benadryl, magnesium, and Toradol.  Patient does have an allergy to NSAIDs in her chart however she reports that she has previously tolerated Toradol without issue.  Will also get CT of the head and x-ray shunt series for further assessment.    Update: Labs notable for white count of 2.9, lower concern for systemic infectious process.  Hemoglobin stable 11.0, lower concern for acute blood loss anemia.  Chemistries relatively unremarkable.  CT of the head showed  shunt in place with minimal decrease in size of shunted ventricles compared to prior.  X-ray shunt series showed no continuity or significant kinking.  On reassessment, patient feels moderately improved with pain score of 6 out of 10.  Added on Decadron and valproic acid for symptomatic control.  Able to ambulate for us without issue no focal neurologic deficits today.  Remains hemodynamically stable during my shift.  Signed out to oncoming provider with pain control and final disposition pending.         Independent Result Review and Interpretation: Relevant laboratory and radiographic results were reviewed and independently interpreted by myself.  As necessary, they are commented on in the ED  Course.    Chronic conditions affecting the patient's care: As documented above in MDM.      Disposition   Sign Out    Carlos Ramirez MD  Emergency Medicine PGY3      Procedures     Procedures ? SmartLinks last updated 1/16/2025 2:42 PM        Carlos Ramirez MD  Resident  01/16/25 0671

## 2025-01-17 DIAGNOSIS — G43.711 CHRONIC MIGRAINE WITHOUT AURA, INTRACTABLE, WITH STATUS MIGRAINOSUS: ICD-10-CM

## 2025-01-17 LAB
ATRIAL RATE: 95 BPM
P AXIS: 33 DEGREES
P OFFSET: 189 MS
P ONSET: 119 MS
PR INTERVAL: 188 MS
Q ONSET: 213 MS
QRS COUNT: 16 BEATS
QRS DURATION: 94 MS
QT INTERVAL: 364 MS
QTC CALCULATION(BAZETT): 457 MS
QTC FREDERICIA: 424 MS
R AXIS: -30 DEGREES
T AXIS: 95 DEGREES
T OFFSET: 395 MS
VENTRICULAR RATE: 95 BPM

## 2025-01-17 NOTE — DISCHARGE INSTRUCTIONS
Follow-up with your primary care doctor as needed.  If you do not have a primary care doctor you may call [phone] to make an appointment.

## 2025-01-17 NOTE — PROGRESS NOTES
I received this patient during signout at 1500.   Please see previous provider's note for detailed H&P, labs and imaging.      Under my care, patient reassessed and remains clinically stable. See ED course below.    @  ED Course as of 01/16/25 2016   Thu Jan 16, 2025 2002 Patient received Depacon, Decadron, magnesium, normal saline bolus, Benadryl and Toradol prior to my shift for headache.  She notes improvement in symptoms.  Patient is ambulatory. [SA]      ED Course User Index  [SA] Tiffany Alvarez DO         Diagnoses as of 01/16/25 2016   Intractable headache, unspecified chronicity pattern, unspecified headache type   @    Disposition:     Discharged in stable condition. Patient will follow-up with the primary physician in the next 2-3 days. Return if worse. They understand return precautions and discharge instructions. Patient and family/friend/caregiver are in agreement with this plan.     Patient seen and staffed with attending physician.     Tiffany Alvarez DO   EM PGY3

## 2025-01-20 ENCOUNTER — APPOINTMENT (OUTPATIENT)
Dept: NEUROSURGERY | Facility: CLINIC | Age: 50
End: 2025-01-20
Payer: MEDICAID

## 2025-01-20 RX ORDER — LASMIDITAN 100 MG/1
100 TABLET ORAL AS NEEDED
Qty: 9 TABLET | Refills: 3 | OUTPATIENT
Start: 2025-01-20 | End: 2026-01-20

## 2025-01-22 ENCOUNTER — APPOINTMENT (OUTPATIENT)
Dept: RADIOLOGY | Facility: HOSPITAL | Age: 50
End: 2025-01-22
Payer: MEDICAID

## 2025-01-22 ENCOUNTER — HOSPITAL ENCOUNTER (EMERGENCY)
Facility: HOSPITAL | Age: 50
Discharge: HOME | End: 2025-01-23
Attending: STUDENT IN AN ORGANIZED HEALTH CARE EDUCATION/TRAINING PROGRAM
Payer: MEDICAID

## 2025-01-22 DIAGNOSIS — R51.9 NONINTRACTABLE HEADACHE, UNSPECIFIED CHRONICITY PATTERN, UNSPECIFIED HEADACHE TYPE: ICD-10-CM

## 2025-01-22 DIAGNOSIS — Z98.2 VP (VENTRICULOPERITONEAL) SHUNT STATUS: Primary | ICD-10-CM

## 2025-01-22 DIAGNOSIS — N39.0 URINARY TRACT INFECTION WITHOUT HEMATURIA, SITE UNSPECIFIED: ICD-10-CM

## 2025-01-22 PROCEDURE — 71045 X-RAY EXAM CHEST 1 VIEW: CPT

## 2025-01-22 PROCEDURE — 84075 ASSAY ALKALINE PHOSPHATASE: CPT

## 2025-01-22 PROCEDURE — 71045 X-RAY EXAM CHEST 1 VIEW: CPT | Performed by: RADIOLOGY

## 2025-01-22 PROCEDURE — 62270 DX LMBR SPI PNXR: CPT

## 2025-01-22 PROCEDURE — 99285 EMERGENCY DEPT VISIT HI MDM: CPT | Performed by: STUDENT IN AN ORGANIZED HEALTH CARE EDUCATION/TRAINING PROGRAM

## 2025-01-22 PROCEDURE — 83690 ASSAY OF LIPASE: CPT

## 2025-01-22 PROCEDURE — 70450 CT HEAD/BRAIN W/O DYE: CPT | Performed by: RADIOLOGY

## 2025-01-22 PROCEDURE — 74018 RADEX ABDOMEN 1 VIEW: CPT

## 2025-01-22 PROCEDURE — 85652 RBC SED RATE AUTOMATED: CPT

## 2025-01-22 PROCEDURE — 85025 COMPLETE CBC W/AUTO DIFF WBC: CPT

## 2025-01-22 PROCEDURE — 83735 ASSAY OF MAGNESIUM: CPT

## 2025-01-22 PROCEDURE — 70450 CT HEAD/BRAIN W/O DYE: CPT

## 2025-01-22 PROCEDURE — 99285 EMERGENCY DEPT VISIT HI MDM: CPT | Mod: 25 | Performed by: STUDENT IN AN ORGANIZED HEALTH CARE EDUCATION/TRAINING PROGRAM

## 2025-01-22 PROCEDURE — 70250 X-RAY EXAM OF SKULL: CPT | Performed by: RADIOLOGY

## 2025-01-22 PROCEDURE — 74018 RADEX ABDOMEN 1 VIEW: CPT | Performed by: RADIOLOGY

## 2025-01-22 PROCEDURE — 85610 PROTHROMBIN TIME: CPT

## 2025-01-22 PROCEDURE — 86140 C-REACTIVE PROTEIN: CPT

## 2025-01-22 ASSESSMENT — PAIN - FUNCTIONAL ASSESSMENT: PAIN_FUNCTIONAL_ASSESSMENT: 0-10

## 2025-01-22 ASSESSMENT — PAIN SCALES - GENERAL: PAINLEVEL_OUTOF10: 7

## 2025-01-22 ASSESSMENT — PAIN DESCRIPTION - LOCATION: LOCATION: HEAD

## 2025-01-22 ASSESSMENT — PAIN DESCRIPTION - PAIN TYPE: TYPE: ACUTE PAIN;CHRONIC PAIN

## 2025-01-22 NOTE — ED TRIAGE NOTES
Pt presented to ED for c/o dizziness, HA, L abd pain. Pt has a  shunt. Sent in by neurosurgery for evaluation.

## 2025-01-23 ENCOUNTER — APPOINTMENT (OUTPATIENT)
Dept: RADIOLOGY | Facility: HOSPITAL | Age: 50
End: 2025-01-23
Payer: MEDICAID

## 2025-01-23 ENCOUNTER — TELEPHONE (OUTPATIENT)
Dept: NEUROLOGY | Facility: CLINIC | Age: 50
End: 2025-01-23
Payer: MEDICAID

## 2025-01-23 ENCOUNTER — TELEPHONE (OUTPATIENT)
Dept: PRIMARY CARE | Facility: CLINIC | Age: 50
End: 2025-01-23
Payer: MEDICAID

## 2025-01-23 VITALS
BODY MASS INDEX: 46.56 KG/M2 | WEIGHT: 253 LBS | SYSTOLIC BLOOD PRESSURE: 138 MMHG | HEIGHT: 62 IN | RESPIRATION RATE: 16 BRPM | OXYGEN SATURATION: 91 % | TEMPERATURE: 98 F | DIASTOLIC BLOOD PRESSURE: 95 MMHG | HEART RATE: 87 BPM

## 2025-01-23 LAB
ALBUMIN SERPL BCP-MCNC: 4.1 G/DL (ref 3.4–5)
ALP SERPL-CCNC: 90 U/L (ref 33–110)
ALT SERPL W P-5'-P-CCNC: 21 U/L (ref 7–45)
ANION GAP SERPL CALC-SCNC: 16 MMOL/L (ref 10–20)
APPEARANCE CSF: CLEAR
APPEARANCE UR: ABNORMAL
APTT PPP: 44 SECONDS (ref 27–38)
AST SERPL W P-5'-P-CCNC: 15 U/L (ref 9–39)
BACTERIA #/AREA URNS AUTO: ABNORMAL /HPF
BASOPHILS # BLD AUTO: 0.03 X10*3/UL (ref 0–0.1)
BASOPHILS NFR BLD AUTO: 0.6 %
BASOPHILS NFR CSF MANUAL: 0 %
BILIRUB SERPL-MCNC: 0.3 MG/DL (ref 0–1.2)
BILIRUB UR STRIP.AUTO-MCNC: NEGATIVE MG/DL
BLASTS CSF MANUAL: 0 %
BUN SERPL-MCNC: 13 MG/DL (ref 6–23)
CALCIUM SERPL-MCNC: 9.8 MG/DL (ref 8.6–10.6)
CHLORIDE SERPL-SCNC: 101 MMOL/L (ref 98–107)
CO2 SERPL-SCNC: 29 MMOL/L (ref 21–32)
COLOR CSF: COLORLESS
COLOR SPUN CSF: COLORLESS
COLOR UR: YELLOW
CREAT SERPL-MCNC: 0.89 MG/DL (ref 0.5–1.05)
CRP SERPL-MCNC: 0.43 MG/DL
EGFRCR SERPLBLD CKD-EPI 2021: 81 ML/MIN/1.73M*2
EOSINOPHIL # BLD AUTO: 0.08 X10*3/UL (ref 0–0.7)
EOSINOPHIL NFR BLD AUTO: 1.6 %
EOSINOPHIL NFR CSF MANUAL: 0 %
ERYTHROCYTE [DISTWIDTH] IN BLOOD BY AUTOMATED COUNT: 14.9 % (ref 11.5–14.5)
ERYTHROCYTE [SEDIMENTATION RATE] IN BLOOD BY WESTERGREN METHOD: 35 MM/H (ref 0–20)
GLUCOSE CSF-MCNC: 64 MG/DL (ref 40–70)
GLUCOSE SERPL-MCNC: 148 MG/DL (ref 74–99)
GLUCOSE UR STRIP.AUTO-MCNC: NORMAL MG/DL
HCT VFR BLD AUTO: 37.8 % (ref 36–46)
HGB BLD-MCNC: 12.3 G/DL (ref 12–16)
HOLD SPECIMEN: NORMAL
IMM GRANULOCYTES # BLD AUTO: 0.02 X10*3/UL (ref 0–0.7)
IMM GRANULOCYTES NFR BLD AUTO: 0.4 % (ref 0–0.9)
IMM GRANULOCYTES NFR CSF: 0 %
INR PPP: 1 (ref 0.9–1.1)
KETONES UR STRIP.AUTO-MCNC: NEGATIVE MG/DL
LEUKOCYTE ESTERASE UR QL STRIP.AUTO: ABNORMAL
LIPASE SERPL-CCNC: 56 U/L (ref 9–82)
LYMPHOCYTES # BLD AUTO: 1.9 X10*3/UL (ref 1.2–4.8)
LYMPHOCYTES NFR BLD AUTO: 38.9 %
LYMPHOCYTES NFR CSF MANUAL: 73 % (ref 28–96)
MAGNESIUM SERPL-MCNC: 2.09 MG/DL (ref 1.6–2.4)
MCH RBC QN AUTO: 26.8 PG (ref 26–34)
MCHC RBC AUTO-ENTMCNC: 32.5 G/DL (ref 32–36)
MCV RBC AUTO: 82 FL (ref 80–100)
MONOCYTES # BLD AUTO: 0.38 X10*3/UL (ref 0.1–1)
MONOCYTES NFR BLD AUTO: 7.8 %
MONOS+MACROS NFR CSF MANUAL: 15 % (ref 16–56)
NEUTROPHILS # BLD AUTO: 2.47 X10*3/UL (ref 1.2–7.7)
NEUTROPHILS NFR BLD AUTO: 50.7 %
NEUTS SEG NFR CSF MANUAL: 13 % (ref 0–5)
NITRITE UR QL STRIP.AUTO: ABNORMAL
NRBC BLD-RTO: 0 /100 WBCS (ref 0–0)
OTHER CELLS NFR CSF MANUAL: 0 %
PH UR STRIP.AUTO: 7.5 [PH]
PLASMA CELLS NFR CSF MICRO: 0 %
PLATELET # BLD AUTO: 332 X10*3/UL (ref 150–450)
POTASSIUM SERPL-SCNC: 3.6 MMOL/L (ref 3.5–5.3)
PROT CSF-MCNC: 125 MG/DL (ref 15–45)
PROT SERPL-MCNC: 7.5 G/DL (ref 6.4–8.2)
PROT UR STRIP.AUTO-MCNC: ABNORMAL MG/DL
PROTHROMBIN TIME: 11.1 SECONDS (ref 9.8–12.8)
RBC # BLD AUTO: 4.59 X10*6/UL (ref 4–5.2)
RBC # CSF AUTO: 143 /UL (ref 0–5)
RBC # UR STRIP.AUTO: NEGATIVE /UL
RBC #/AREA URNS AUTO: ABNORMAL /HPF
SODIUM SERPL-SCNC: 142 MMOL/L (ref 136–145)
SP GR UR STRIP.AUTO: 1.02
SQUAMOUS #/AREA URNS AUTO: ABNORMAL /HPF
TOTAL CELLS COUNTED CSF: 55
TUBE # CSF: ABNORMAL
UROBILINOGEN UR STRIP.AUTO-MCNC: NORMAL MG/DL
WBC # BLD AUTO: 4.9 X10*3/UL (ref 4.4–11.3)
WBC # CSF AUTO: 3 /UL (ref 1–5)
WBC #/AREA URNS AUTO: >50 /HPF

## 2025-01-23 PROCEDURE — 2500000001 HC RX 250 WO HCPCS SELF ADMINISTERED DRUGS (ALT 637 FOR MEDICARE OP): Mod: SE

## 2025-01-23 PROCEDURE — 87086 URINE CULTURE/COLONY COUNT: CPT

## 2025-01-23 PROCEDURE — 87070 CULTURE OTHR SPECIMN AEROBIC: CPT | Mod: 59 | Performed by: STUDENT IN AN ORGANIZED HEALTH CARE EDUCATION/TRAINING PROGRAM

## 2025-01-23 PROCEDURE — 2550000001 HC RX 255 CONTRASTS: Mod: SE | Performed by: STUDENT IN AN ORGANIZED HEALTH CARE EDUCATION/TRAINING PROGRAM

## 2025-01-23 PROCEDURE — 2500000002 HC RX 250 W HCPCS SELF ADMINISTERED DRUGS (ALT 637 FOR MEDICARE OP, ALT 636 FOR OP/ED): Mod: SE

## 2025-01-23 PROCEDURE — 2500000004 HC RX 250 GENERAL PHARMACY W/ HCPCS (ALT 636 FOR OP/ED): Mod: SE

## 2025-01-23 PROCEDURE — 81001 URINALYSIS AUTO W/SCOPE: CPT

## 2025-01-23 PROCEDURE — 84157 ASSAY OF PROTEIN OTHER: CPT | Performed by: STUDENT IN AN ORGANIZED HEALTH CARE EDUCATION/TRAINING PROGRAM

## 2025-01-23 PROCEDURE — 96365 THER/PROPH/DIAG IV INF INIT: CPT

## 2025-01-23 PROCEDURE — 96375 TX/PRO/DX INJ NEW DRUG ADDON: CPT

## 2025-01-23 PROCEDURE — 74177 CT ABD & PELVIS W/CONTRAST: CPT | Performed by: RADIOLOGY

## 2025-01-23 PROCEDURE — 89051 BODY FLUID CELL COUNT: CPT | Performed by: STUDENT IN AN ORGANIZED HEALTH CARE EDUCATION/TRAINING PROGRAM

## 2025-01-23 PROCEDURE — 74177 CT ABD & PELVIS W/CONTRAST: CPT

## 2025-01-23 PROCEDURE — 82945 GLUCOSE OTHER FLUID: CPT | Performed by: STUDENT IN AN ORGANIZED HEALTH CARE EDUCATION/TRAINING PROGRAM

## 2025-01-23 PROCEDURE — 87205 SMEAR GRAM STAIN: CPT | Performed by: STUDENT IN AN ORGANIZED HEALTH CARE EDUCATION/TRAINING PROGRAM

## 2025-01-23 PROCEDURE — 87075 CULTR BACTERIA EXCEPT BLOOD: CPT | Mod: 59 | Performed by: STUDENT IN AN ORGANIZED HEALTH CARE EDUCATION/TRAINING PROGRAM

## 2025-01-23 RX ORDER — NITROFURANTOIN (MACROCRYSTALS) 100 MG/1
100 CAPSULE ORAL ONCE
Status: DISCONTINUED | OUTPATIENT
Start: 2025-01-23 | End: 2025-01-23 | Stop reason: HOSPADM

## 2025-01-23 RX ORDER — TIZANIDINE 4 MG/1
2 TABLET ORAL EVERY 6 HOURS PRN
Status: DISCONTINUED | OUTPATIENT
Start: 2025-01-23 | End: 2025-01-23 | Stop reason: HOSPADM

## 2025-01-23 RX ORDER — AMOXICILLIN AND CLAVULANATE POTASSIUM 875; 125 MG/1; MG/1
1 TABLET, FILM COATED ORAL EVERY 12 HOURS
Qty: 14 TABLET | Refills: 0 | Status: SHIPPED | OUTPATIENT
Start: 2025-01-23 | End: 2025-01-31 | Stop reason: HOSPADM

## 2025-01-23 RX ORDER — LACOSAMIDE 100 MG/1
300 TABLET ORAL ONCE
Status: COMPLETED | OUTPATIENT
Start: 2025-01-23 | End: 2025-01-23

## 2025-01-23 RX ORDER — ACETAMINOPHEN 325 MG/1
975 TABLET ORAL ONCE
Status: COMPLETED | OUTPATIENT
Start: 2025-01-23 | End: 2025-01-23

## 2025-01-23 RX ORDER — NITROFURANTOIN 25; 75 MG/1; MG/1
100 CAPSULE ORAL 2 TIMES DAILY
Qty: 10 CAPSULE | Refills: 0 | Status: SHIPPED | OUTPATIENT
Start: 2025-01-23 | End: 2025-01-23 | Stop reason: WASHOUT

## 2025-01-23 RX ORDER — MAGNESIUM SULFATE 1 G/100ML
1 INJECTION INTRAVENOUS ONCE
Status: COMPLETED | OUTPATIENT
Start: 2025-01-23 | End: 2025-01-23

## 2025-01-23 RX ORDER — MIDAZOLAM HYDROCHLORIDE 1 MG/ML
2 INJECTION INTRAMUSCULAR; INTRAVENOUS ONCE
Status: COMPLETED | OUTPATIENT
Start: 2025-01-23 | End: 2025-01-23

## 2025-01-23 RX ORDER — LEVETIRACETAM 500 MG/1
1500 TABLET ORAL ONCE
Status: COMPLETED | OUTPATIENT
Start: 2025-01-23 | End: 2025-01-23

## 2025-01-23 RX ORDER — HYDROCORTISONE 20 MG/1
10 TABLET ORAL ONCE
Status: COMPLETED | OUTPATIENT
Start: 2025-01-23 | End: 2025-01-23

## 2025-01-23 RX ORDER — HEPARIN 100 UNIT/ML
5 SYRINGE INTRAVENOUS ONCE
Status: COMPLETED | OUTPATIENT
Start: 2025-01-23 | End: 2025-01-23

## 2025-01-23 RX ADMIN — TIZANIDINE 2 MG: 4 TABLET ORAL at 08:45

## 2025-01-23 RX ADMIN — ACETAMINOPHEN 975 MG: 325 TABLET ORAL at 03:07

## 2025-01-23 RX ADMIN — IOHEXOL 90 ML: 350 INJECTION, SOLUTION INTRAVENOUS at 01:45

## 2025-01-23 RX ADMIN — HEPARIN 500 UNITS: 100 SYRINGE at 08:54

## 2025-01-23 RX ADMIN — SODIUM CHLORIDE 1000 ML: 9 INJECTION, SOLUTION INTRAVENOUS at 03:08

## 2025-01-23 RX ADMIN — HYDROCORTISONE 10 MG: 20 TABLET ORAL at 08:46

## 2025-01-23 RX ADMIN — LEVETIRACETAM 1500 MG: 500 TABLET, FILM COATED ORAL at 08:45

## 2025-01-23 RX ADMIN — LACOSAMIDE 300 MG: 100 TABLET, FILM COATED ORAL at 08:45

## 2025-01-23 RX ADMIN — MAGNESIUM SULFATE HEPTAHYDRATE 1 G: 1 INJECTION, SOLUTION INTRAVENOUS at 03:08

## 2025-01-23 RX ADMIN — MIDAZOLAM HYDROCHLORIDE 2 MG: 1 INJECTION, SOLUTION INTRAMUSCULAR; INTRAVENOUS at 05:28

## 2025-01-23 ASSESSMENT — PAIN - FUNCTIONAL ASSESSMENT: PAIN_FUNCTIONAL_ASSESSMENT: 0-10

## 2025-01-23 ASSESSMENT — PAIN SCALES - GENERAL: PAINLEVEL_OUTOF10: 0 - NO PAIN

## 2025-01-23 ASSESSMENT — PAIN DESCRIPTION - PROGRESSION: CLINICAL_PROGRESSION: GRADUALLY IMPROVING

## 2025-01-23 NOTE — PROGRESS NOTES
Emergency Department Transition of Care Note       Signout   I received Jonathan Ayala in signout from Dr. Josefina Gunter.  Please see the ED Provider Note for all HPI, PE and MDM up to the time of signout at 0700.  This is in addition to the primary record.    In brief Jonathan Ayala is an 49 y.o. female presenting for headache in the setting of a  shunt for her history of IIH.  She is also having abdominal pain in addition to her headache.  X-ray shunt series was unremarkable and the patient did have a CT abdomen pelvis which did not show any acute findings.  Labs were notable for urinary tract infection for which she was started on nitrofurantoin but there was no evidence of leukocytosis, electrolyte derangements or other concerning laboratory findings.  Neurosurgery was consulted and recommended an LP which they performed at bedside.  We are awaiting LP results and final neurosurgery recommendations at the time of signout.    At the time of signout we were awaiting:  LP results and neurosurgery recommendations.    ED Course & Medical Decision Making   Medical Decision Making:  Under my care, the patient was reevaluated and felt significantly improved with no headache or residual dizziness.  I did speak with neurosurgery who reviewed the patient's LP studies and is comfortable with her discharging home.  The patient was treated for her urinary tract infection and provided with her morning antiepileptic medication as well as a dose of tizanidine for her chronic back pain.  LP studies did show some mildly elevated protein but no other concerning findings.  I did discuss this with neurosurgery who felt comfortable with the patient returning home given these results and her improvement in symptoms.  The patient is appropriate for discharge at this time and was provided with a prescription for nitrofurantoin which was sent to their pharmacy.  Strict return precautions were provided including severe back pain,  significant nausea/vomiting, inability to tolerate p.o. intake, uncontrolled fevers, severe/crushing chest pain, shortness of breath on exertion, syncope, severe/sudden onset headaches or strokelike symptoms.  The patient verbalized their understanding of this and was discharged in stable condition.  She was instructed to follow-up with her primary care provider within the next 2 weeks for reevaluation of her urinary symptoms and headaches.    ED Course:  ED Course as of 01/23/25 0840   Thu Jan 23, 2025   0312 Patient CMP now electrolyte abnormalities, no leukocytosis, patient CRP 0.43, ESR mildly elevated at 35.  Shunt series shows no kinks or discontinuity, CT head shows no acute enlarged ventricles, bleeds, ischemia.  CT abdomen negative for infection, bleed, acute abnormalities.  Per ophthalmology no papilledema on their exam.  Patient given Tylenol, fluids, magnesium for headache. [TS]   0338 Patient seen to have a UTI treated with nitrofurantoin due to her multiple allergies.  Per neurosurgery will plan for an LP [TS]   0619 Patient was signed out to the oncoming provider pending neurosurgery recommendations and LP results [TS]      ED Course User Index  [TS] Josefina Gunter MD         Diagnoses as of 01/23/25 0840   Nonintractable headache, unspecified chronicity pattern, unspecified headache type   Urinary tract infection without hematuria, site unspecified       Disposition   As a result of the work-up, the patient was discharged home.  she was informed of her diagnosis and instructed to come back with any   concerns or worsening of condition.  she and was agreeable to the plan as discussed above.  she was given the opportunity to ask questions.  All of the patient's questions were answered.    Procedures   Procedures    Patient seen and discussed with ED attending physician.    Emmanuel Wilkerson, DO  Emergency Medicine

## 2025-01-23 NOTE — TELEPHONE ENCOUNTER
Sanket called to check if she had an appointment with Dr Bai, She does not.  She needs to Follow Up regarding Migrains after surgery.  When would be a good time First available or squeez her in

## 2025-01-23 NOTE — PROCEDURES
"Lumbar puncture    Date/Time: 1/23/2025 5:43 AM    Performed by: Maria Luisa Arora MD  Authorized by: Avelino Tafoya MD  Consent: Verbal consent obtained.  Consent given by: patient  Patient consent: the patient's understanding of the procedure matches consent given  Procedure consent: procedure consent matches procedure scheduled  Relevant documents: relevant documents present and verified  Test results: test results available and properly labeled  Site marked: the operative site was marked  Imaging studies: imaging studies available  Required items: required blood products, implants, devices, and special equipment available  Patient identity confirmed: verbally with patient  Time out: Immediately prior to procedure a \"time out\" was called to verify the correct patient, procedure, equipment, support staff and site/side marked as required.  Preparation: Patient was prepped and draped in the usual sterile fashion.  Comments: A time-out was performed.  The patient was placed in the right lateral decubitus position in a semi-fetal position with help from the nursing staff.  The area was cleansed and draped in usual sterile fashion.  Anesthesia was achieved with 1% lidocaine.  A 20-gauge 3.5in spinal needle was placed in the L4-L5 interspace.  Clear cerebral spinal fluid was obtained.  Four tubes were filled with 4 mL of CSF.  These were sent for the usual tests, including 1 tube to be held for further analysis if needed.  The patient had no immediate complications and tolerated the procedure well. OP 21.                 "

## 2025-01-23 NOTE — ED PROVIDER NOTES
Emergency Department Provider Note        History of Present Illness     History provided by: Patient  Limitations to History: None    HPI:  Is a 49-year-old female presenting to the ED with a past medical history of IIH, focal epilepsy, migraine, POTS, type 2 diabetes, CVID, Sjogren disorder presenting to the ED for headache.  Patient states that starting yesterday she had a bandlike headache.  Patient states she has had these in the past patient states associated lightheadedness, dizziness and states that it is worse with her head movement.  Patient denying blurriness of vision, eye pain.  Patient states that she typically gets right hemiplegia during these episodes and states numbness on her right side of her face.  Patient also complaining of left lower quadrant pain.  Patient states is been constant for the past 3 days.  Patient states she has a history of IBS and states that she is not on a bowel regimen and states she had a bowel movement this morning.  Patient states she is able to pass gas.  Patient states that she also developed that left lower quadrant on and off for the past few weeks and states that not been cons for the past 3 days.  Patient states that after having a bowel movement the pain is relieved.  Patient states increased urinary frequency however denies any dysuria, history of STDs, vaginal discharge or bleeding.  Patient has a history of a hysterectomy.  Physical Exam   Triage vitals:  T 36.1 °C (96.9 °F)  HR 82  /77  RR 16  O2 96 % None (Room air)    Physical Exam  Constitutional:       Appearance: Normal appearance.   HENT:      Head: Normocephalic.   Eyes:      General:         Right eye: No discharge.         Left eye: No discharge.      Extraocular Movements: Extraocular movements intact.      Conjunctiva/sclera: Conjunctivae normal.      Pupils: Pupils are equal, round, and reactive to light.   Cardiovascular:      Rate and Rhythm: Normal rate.   Pulmonary:      Effort:  Pulmonary effort is normal.   Abdominal:      General: Abdomen is flat.      Tenderness: There is abdominal tenderness (Left lower quadrant, left flank). There is left CVA tenderness.   Musculoskeletal:         General: Normal range of motion.      Cervical back: Normal range of motion.   Skin:     General: Skin is warm.   Neurological:      Mental Status: She is alert and oriented to person, place, and time. Mental status is at baseline.      Sensory: Sensory deficit (Right facial numbness) present.      Motor: No weakness.      Coordination: Coordination normal.      Gait: Gait normal.   Psychiatric:         Mood and Affect: Mood normal.         Behavior: Behavior normal.          Medical Decision Making & ED Course   Medical Decision Making:  Patient is a 49-year-old female presenting to the ED for left lower quadrant pain and dizziness.  Regarding the dizziness patient has a history of a  shunt and states bandlike headache.  Patient states she has had these in the past with associated right hemiplegia.  Patient complaining of right facial numbness.  Patient is able to open and close her eyes without any issues, no facial droop.  Patient neuroexam is ANO x 4, normal coordination and normal strength in all her extremities.  Patient gait normal as well.  Due to patient's history of  shunt will obtain a  shunt series as well as a CT head due to concern for shunt malfunction, enlarged ventricles.  Will engage neurosurgery as patient's neurosurgeon told her to come in.  Patient also complaining of left lower quadrant pain.  It is constant for the past 3 days however patient states that she has had the pain on and off for the past few weeks patient had a CT abdomen on the eighth during that time she was having the pain which did not show small bowel obstruction, ovarian cyst that would indicate a possible ovarian torsion, masses or bleeds.  Patient states that the pain gets better after having a bowel movement  and she is able to have pass gas.  Patient denies any history of STDs, no discharge or vaginal bleeding.  At this time less likely PID, ovarian torsion, ovarian cyst rupture, ectopic pregnancy as patient also had a hysterectomy.  This is most likely IBS as patient states she has had this pain in the past and it gets better after having a bowel movement.  Will not obtain a CT abdomen as she previously had 1 with similar symptoms.  Encourage good bowel regimen as patient states she recently started medications to help with her bowel movements.  Please see ED course for further details.         EKG Independent Interpretation: EKG not obtained        The patient was discussed with the following consultants/services: Neurosurgery      ED Course as of 01/25/25 1114   Thu Jan 23, 2025   0312 Patient CMP now electrolyte abnormalities, no leukocytosis, patient CRP 0.43, ESR mildly elevated at 35.  Shunt series shows no kinks or discontinuity, CT head shows no acute enlarged ventricles, bleeds, ischemia.  CT abdomen negative for infection, bleed, acute abnormalities.  Per ophthalmology no papilledema on their exam.  Patient given Tylenol, fluids, magnesium for headache. [TS]   0338 Patient seen to have a UTI treated with nitrofurantoin due to her multiple allergies.  Per neurosurgery will plan for an LP [TS]   0619 Patient was signed out to the oncoming provider pending neurosurgery recommendations and LP results [TS]      ED Course User Index  [TS] Josefina Gunter MD         Diagnoses as of 01/25/25 1114   Nonintractable headache, unspecified chronicity pattern, unspecified headache type   Urinary tract infection without hematuria, site unspecified          Disposition   Patient was signed out to, oncoming provider pending completion of their work-up.  Please see the next provider's transition of care note for the remainder of the patient's care.     Procedures   Procedures    Patient seen and discussed with ED attending  physician.    Josefina Gunter MD  Emergency Medicine     Josefina Gunter MD  Resident  01/23/25 0619       Neo Ayon MD  01/25/25 9782

## 2025-01-23 NOTE — TELEPHONE ENCOUNTER
She went to ER for issues with her shunt & was also diagnosed with a UTI    She is calling to schedule an ER follow-up

## 2025-01-23 NOTE — CONSULTS
Consults    Reason For Consult  HERRERA, VPS    History Of Present Illness  Jonathan Ayala is a 49 y.o. 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 10/30 s/p R VPS exploration w abdominal catheter repositioning w improvement in distal runnoff, 11/6 p/w min clear incisional drainage, min decr in vision, incision oversewn with no further leakage noted, 11/15 p/w fluid leaking from incision site, HA 11/15 s/p RF shunt wound revision and fractured valve replacement, 12/9 p/w wound dehiscence/incisional leakage, 12/10 s/p R cranial wound exploration, washout, revision, 1/8 p/w double vision, nausea and vomiting, and 2 seizure events, CTH stable vents, SS intact certas at 5, CT AP no pseudocyst, s/p LP (OP 27), certas dialed to 4, routine EEG neg, vimpat increased, 1/23 pw/ 1d band-like HA, N/V, abdominal pain.    Patient with history of age and multiple shunt revisions who was recently admitted.  She is presenting with 1 day of pressure-like headache band like pattern on top of her head and bilateral temples.  Denies any changes to her vision.  Denies any focal neurologic deficit.  Has been also dealing with longstanding nausea and vomiting as well as recently worsening left lower quadrant abdominal pain.  Does not take any blood thinners.  Denies any issues with the incision or any drainage.  Denies any fevers, chills, night sweats or any other infectious signs.     Past Medical History  She has a past medical history of Abnormal findings on diagnostic imaging of other abdominal regions, including retroperitoneum (10/14/2020), Acquired deformity of nose (03/24/2022), Acute upper respiratory infection, unspecified (10/16/2019),  Adrenal disease (Multi), Allergic, Allergy status to unspecified drugs, medicaments and biological substances (05/22/2020), Allergy status to unspecified drugs, medicaments and biological substances (11/13/2020), Anemia, Anxiety (2005), Asthma, Benign intracranial hypertension (01/27/2022), Bipolar disorder, unspecified (Multi), Breast calcification, right (08/21/2018), Cellulitis of abdominal wall (09/28/2022), Cervicalgia (07/01/2020), Chronic maxillary sinusitis (01/04/2022), Chronic sialoadenitis (03/16/2020), COVID-19 (01/06/2022), Decreased white blood cell count, unspecified (11/04/2019), Disease of thyroid gland, Disturbances of salivary secretion (03/16/2020), Dry eye syndrome of bilateral lacrimal glands (10/07/2022), Encounter for preprocedural cardiovascular examination (02/01/2022), Food additives allergy status (06/11/2020), Fracture of nasal bones, initial encounter for closed fracture (03/03/2022), GERD (gastroesophageal reflux disease) (15 years old), Granuloma of right orbit (10/07/2021), History of endometrial ablation (11/09/2017), Hyperlipidemia, Hypertension, Hyperthyroidism, Hypoglycemia, Hypothyroidism, Immunocompromised, Localized swelling, mass and lump, head (03/24/2022), Major depressive disorder, recurrent, in full remission (CMS-HCC) (10/07/2021), 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), Meningitis ([facility]) (2008), Migraine, Nipple discharge (08/24/2022), Ocular pain, right eye (10/07/2022), Optic atrophy, Other abnormal and inconclusive findings on diagnostic imaging of breast (07/06/2020), Other anomalies of pupillary function (05/31/2019), Other chest pain (05/18/2020), Other conditions influencing health status (08/01/2022), Other conditions influencing health status (08/03/2021), Other specified disorders of eustachian tube, left ear (11/18/2019), Other specified disorders of nose and nasal sinuses (03/24/2022), Other specified disorders of nose and nasal sinuses  (03/24/2022), Pelvic and perineal pain (07/06/2020), Personal history of other diseases of the circulatory system (04/07/2020), Personal history of other diseases of the circulatory system (04/07/2020), Personal history of other diseases of the circulatory system, Personal history of other diseases of the digestive system, Personal history of other diseases of the digestive system (03/02/2020), Personal history of other diseases of the musculoskeletal system and connective tissue (01/19/2022), Personal history of other diseases of the musculoskeletal system and connective tissue (03/02/2021), Personal history of other diseases of the musculoskeletal system and connective tissue (06/16/2020), Personal history of other diseases of the nervous system and sense organs (11/18/2019), Personal history of other diseases of the nervous system and sense organs (09/21/2022), Personal history of other diseases of the respiratory system (04/14/2021), Personal history of other endocrine, nutritional and metabolic disease (02/17/2021), Personal history of other mental and behavioral disorders (05/27/2021), Personal history of other specified conditions (09/07/2022), Personal history of other specified conditions (10/16/2019), Personal history of other specified conditions (09/28/2022), Personal history of other specified conditions (09/16/2021), Personal history of other specified conditions (02/01/2022), Personal history of other specified conditions (03/09/2022), Personal history of other specified conditions (02/12/2014), Personal history of other specified conditions (10/27/2021), Personal history of other specified conditions (10/16/2019), Personal history of other specified conditions (02/26/2021), Personal history of other specified conditions (02/22/2021), Personal history of urinary calculi, Polycystic ovary syndrome, Postural orthostatic tachycardia syndrome (POTS), Rash and other nonspecific skin eruption (03/15/2022),  Repeated falls (06/23/2021), Right lower quadrant pain (10/14/2020), Seizures (Multi), Sjogren syndrome, unspecified (Multi), Sjogren's syndrome, Sleep apnea, Slow transit constipation (07/09/2020), Subarachnoid hemorrhage, traumatic (Multi) (04/19/2023), Thyroid nodule, Traumatic subarachnoid hemorrhage with loss of consciousness of unspecified duration, subsequent encounter (03/15/2022), Traumatic subarachnoid hemorrhage without loss of consciousness, subsequent encounter, Type 2 diabetes mellitus, Unspecified disorder of refraction (10/07/2022), Unspecified optic neuritis (11/06/2020), Unspecified optic neuritis (11/06/2020), Unspecified visual loss (09/25/2019), Varicella (As a child), Venous insufficiency (chronic) (peripheral) (10/18/2021), Viral infection, unspecified (01/11/2022), Vitamin D deficiency, and Vitamin D deficiency, unspecified (09/28/2022).    Surgical History  She has a past surgical history that includes Other surgical history (08/22/2019); Other surgical history (08/22/2019); Other surgical history (08/22/2019); Other surgical history (08/22/2019); Other surgical history (08/22/2019); Other surgical history (08/22/2019); MR angio neck wo IV contrast (02/08/2021); MR angio head wo IV contrast (02/08/2021); [place] tooth extraction (2004); Appendectomy (2017); Hysterectomy (2017); Hernia repair (Right, 03/01/2024); Cardiac catheterization; Cholecystectomy; Fracture surgery (12/2023); Endometrial ablation; and Brain surgery ([place] placement to measure pressure).     Social History  She reports that she has never smoked. She has never used smokeless tobacco. She reports that she does not currently use alcohol. She reports current drug use. Drug: Benzodiazepines.    Family History  Family History   Problem Relation Name Age of Onset    Other (Perforated bowel) Mother Radha     Hypertension Mother Radha     Macular degeneration Mother Radha     Depression Mother Radha     Hyperlipidemia  Mother Radha     Mental illness Mother Radha     Hyperlipidemia Father Mac     Hypertension Father Mac     Heart attack Father Mac     Other (S/P CABG) Father Mac     Diabetes Father Mac     Prostate cancer Father Mac     Coronary artery disease Father Mac     Heart failure Father Mac     Stroke Father Mac     Cancer Father Mac     Macular degeneration Father Mac     Retinal detachment Father Mac     Asthma Father Mac     Hearing loss Father Mac     Heart disease Father Mac     Hernia Father Mac     Stroke Sister Jazmin     Breast cancer Sister Jazmin     Lupus Sister Jazmin     Ovarian cancer Sister Jazmin     Astigmatism Sister Jazmin     Arthritis Sister Jazmin     Asthma Sister Jazmin     Depression Sister Jazmin     Mental illness Sister Jazmin     Miscarriages / Stillbirths Sister Jazmin     Vision loss Sister Jazmin     Hyperlipidemia Brother Sanchez     Hypertension Brother Sanchez     Astigmatism Brother Sanchez     Arthritis Brother Sanchez     Asthma Brother Sanchez     Diabetes Father's Sister Linda     Blindness Father's Sister Linda     Vision loss Father's Sister Linda     Thyroid cancer Father's Sister Bekah     Thyroid disease Father's Sister Jessica     Colon cancer Father's Brother ?     Cancer Father's Brother Kian     Anesthesia related problems Father's Brother Kian     Depression Father's Brother Kian     Hernia Father's Brother Kian     Mental illness Father's Brother Kian     Cancer Maternal Grandmother Brenda     Ovarian cancer Maternal Grandmother Brenda     Blindness Other Multiple     Melanoma Other      Asthma Other      Other (hay fever) Other      Allergies Other      Alcohol abuse Paternal Grandfather Elkin     Arthritis Sister Isha     Asthma Sister Isha     Cancer Sister Isha     Depression Sister Isha     Mental illness Sister Isha     Miscarriages / Stillbirths Sister Isha     Ovarian cancer Sister Isha     Glaucoma Neg Hx          Allergies  Acetazolamide, Atorvastatin,  Cefdinir, Ceftriaxone, Doxepin, Duloxetine, Fd and c red no.40, Levofloxacin, Levofloxacin in d5w, Nutritional supplements, Ozempic [semaglutide], Prochlorperazine, Red dye, Rosemary, Rosemary oil, Strawberry, Sulfa (sulfonamide antibiotics), Topiramate, Tree pollen-black walnut, Tree pollen-pecan, [place], Aripiprazole, Aspartame, Aspartame (bulk), Fenofibrate, Gluten, Hydrochlorothiazide, Hydromorphone, Iron, Meclizine, Metformin, Propoxyphene, Statins-hmg-coa reductase inhibitors, Tetracyclines, Thiazides, Venlafaxine, Wheat, Adhesive tape-silicones, Barbiturates, Ciprofloxacin, Dhe, Diet foods, Farxiga [dapagliflozin], Ferrous sulfate, Keflex [cephalexin], L.acidoph-l.bulg-b.bif-s.therm, Nsaids (non-steroidal anti-inflammatory drug), Other, Sulfonylureas, Tobramycin, Vancomycin, Adhesive, Azithromycin, Betamethasone, House dust, Metoclopramide hcl, Prednisone, Propoxyphene-acetaminophen, Sulfacetamide sodium, Rddgucrp-6-li6 antimigraine agents, and Zolpidem    Review of Systems   10 point ROS is obtained and negative except the ones mentioned in the HPI    Physical Exam  Constitutional:       Appearance: She is obese.   HENT:      Head:      Comments: Cranial incisions well-healed     Right Ear: External ear normal.      Left Ear: External ear normal.      Nose: Nose normal.      Mouth/Throat:      Mouth: Mucous membranes are moist.   Eyes:      Pupils: Pupils are equal, round, and reactive to light.   Cardiovascular:      Rate and Rhythm: Normal rate.      Pulses: Normal pulses.   Pulmonary:      Effort: Pulmonary effort is normal.   Abdominal:      General: Abdomen is flat.   Musculoskeletal:      Cervical back: Neck supple.   Skin:     General: Skin is warm.   Neurological:      Comments: Alert and oriented x 3  Face symmetric  Tongue midline  Bilateral upper and lower extremities follows command 5/5          Last Recorded Vitals  Blood pressure 157/86, pulse 96, temperature 36.7 °C (98 °F), temperature source  "Temporal, resp. rate 17, height 1.575 m (5' 2\"), weight 115 kg (253 lb), SpO2 98%.    Relevant Results  None available to review     Assessment/Plan     Jonathan Ayala is a 49 y.o. 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 10/30 s/p R VPS exploration w abdominal catheter repositioning w improvement in distal runnoff, 11/6 p/w min clear incisional drainage, min decr in vision, incision oversewn with no further leakage noted, 11/15 p/w fluid leaking from incision site, HA 11/15 s/p RF shunt wound revision and fractured valve replacement, 12/9 p/w wound dehiscence/incisional leakage, 12/10 s/p R cranial wound exploration, washout, revision, 1/8 p/w double vision, nausea and vomiting, and 2 seizure events, CTH stable vents, SS intact certas at 5, CT AP no pseudocyst, s/p LP (OP 27), certas dialed to 4, routine EEG neg, vimpat increased, 1/23 pw/ 1d band-like HA, N/V, abdominal pain.    Recs:  Recommend obtaining non-contrast CT head, CT abdomen/pelvis, shunt series  Ophthalmology evaluation for optic disc edema evaluation  Labs: CBC, RFP, coags, T/S, pregnancy              "

## 2025-01-23 NOTE — DISCHARGE INSTRUCTIONS
You have been diagnosed with a headache and urinary tract infection in the emergency department today.  Your labs did show evidence of a urinary tract infection and a prescription for nitrofurantoin has been sent to your pharmacy.  Please use this medication as instructed by your pharmacist.  You should follow-up with your primary care provider within 5 days for reevaluation of your symptoms.  Please return to the emergency department if you begin experiencing any severe back pain, uncontrolled fevers with Tylenol/ibuprofen, severe crushing chest pain, shortness of breath on exertion, passout, severe/sudden onset headache, or strokelike symptoms.

## 2025-01-24 ENCOUNTER — TELEPHONE (OUTPATIENT)
Dept: PRIMARY CARE | Facility: CLINIC | Age: 50
End: 2025-01-24
Payer: MEDICAID

## 2025-01-24 ENCOUNTER — TELEPHONE (OUTPATIENT)
Dept: ENDOCRINOLOGY | Facility: CLINIC | Age: 50
End: 2025-01-24
Payer: MEDICAID

## 2025-01-24 DIAGNOSIS — G43.711 CHRONIC MIGRAINE WITHOUT AURA, INTRACTABLE, WITH STATUS MIGRAINOSUS: ICD-10-CM

## 2025-01-24 NOTE — TELEPHONE ENCOUNTER
Received/Initiated a CMN from/for [facility] on 1/24  For Dexcom  CGM    Faxed/emailed to:  [phone] on 1/24

## 2025-01-24 NOTE — TELEPHONE ENCOUNTER
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  Will need new DX codes     [phone] nick

## 2025-01-25 LAB — BACTERIA UR CULT: ABNORMAL

## 2025-01-26 ENCOUNTER — HOSPITAL ENCOUNTER (EMERGENCY)
Facility: HOSPITAL | Age: 50
Discharge: HOME | End: 2025-01-26
Attending: EMERGENCY MEDICINE
Payer: MEDICAID

## 2025-01-26 VITALS
OXYGEN SATURATION: 98 % | HEART RATE: 88 BPM | HEIGHT: 62 IN | WEIGHT: 253 LBS | TEMPERATURE: 98.4 F | DIASTOLIC BLOOD PRESSURE: 85 MMHG | RESPIRATION RATE: 16 BRPM | SYSTOLIC BLOOD PRESSURE: 129 MMHG | BODY MASS INDEX: 46.56 KG/M2

## 2025-01-26 DIAGNOSIS — R51.9 ACUTE NONINTRACTABLE HEADACHE, UNSPECIFIED HEADACHE TYPE: Primary | ICD-10-CM

## 2025-01-26 DIAGNOSIS — R10.84 GENERALIZED ABDOMINAL PAIN: ICD-10-CM

## 2025-01-26 LAB
ALBUMIN SERPL BCP-MCNC: 3.6 G/DL (ref 3.4–5)
ALP SERPL-CCNC: 78 U/L (ref 33–110)
ALT SERPL W P-5'-P-CCNC: 19 U/L (ref 7–45)
ANION GAP SERPL CALC-SCNC: 12 MMOL/L (ref 10–20)
APPEARANCE UR: CLEAR
AST SERPL W P-5'-P-CCNC: 16 U/L (ref 9–39)
BACTERIA CSF CULT: NORMAL
BASOPHILS # BLD AUTO: 0.03 X10*3/UL (ref 0–0.1)
BASOPHILS NFR BLD AUTO: 0.7 %
BILIRUB SERPL-MCNC: 0.2 MG/DL (ref 0–1.2)
BILIRUB UR STRIP.AUTO-MCNC: NEGATIVE MG/DL
BUN SERPL-MCNC: 12 MG/DL (ref 6–23)
CALCIUM SERPL-MCNC: 8 MG/DL (ref 8.6–10.3)
CHLORIDE SERPL-SCNC: 106 MMOL/L (ref 98–107)
CO2 SERPL-SCNC: 27 MMOL/L (ref 21–32)
COLOR UR: COLORLESS
CREAT SERPL-MCNC: 0.81 MG/DL (ref 0.5–1.05)
EGFRCR SERPLBLD CKD-EPI 2021: 90 ML/MIN/1.73M*2
EOSINOPHIL # BLD AUTO: 0.05 X10*3/UL (ref 0–0.7)
EOSINOPHIL NFR BLD AUTO: 1.2 %
ERYTHROCYTE [DISTWIDTH] IN BLOOD BY AUTOMATED COUNT: 15.1 % (ref 11.5–14.5)
GLUCOSE SERPL-MCNC: 162 MG/DL (ref 74–99)
GLUCOSE UR STRIP.AUTO-MCNC: NORMAL MG/DL
GRAM STN SPEC: NORMAL
GRAM STN SPEC: NORMAL
HCT VFR BLD AUTO: 37.8 % (ref 36–46)
HGB BLD-MCNC: 11.7 G/DL (ref 12–16)
IMM GRANULOCYTES # BLD AUTO: 0 X10*3/UL (ref 0–0.7)
IMM GRANULOCYTES NFR BLD AUTO: 0 % (ref 0–0.9)
KETONES UR STRIP.AUTO-MCNC: NEGATIVE MG/DL
LEUKOCYTE ESTERASE UR QL STRIP.AUTO: NEGATIVE
LYMPHOCYTES # BLD AUTO: 1.63 X10*3/UL (ref 1.2–4.8)
LYMPHOCYTES NFR BLD AUTO: 38.8 %
MCH RBC QN AUTO: 26.5 PG (ref 26–34)
MCHC RBC AUTO-ENTMCNC: 31 G/DL (ref 32–36)
MCV RBC AUTO: 86 FL (ref 80–100)
MONOCYTES # BLD AUTO: 0.31 X10*3/UL (ref 0.1–1)
MONOCYTES NFR BLD AUTO: 7.4 %
NEUTROPHILS # BLD AUTO: 2.18 X10*3/UL (ref 1.2–7.7)
NEUTROPHILS NFR BLD AUTO: 51.9 %
NITRITE UR QL STRIP.AUTO: NEGATIVE
NRBC BLD-RTO: 0 /100 WBCS (ref 0–0)
PH UR STRIP.AUTO: 6.5 [PH]
PLATELET # BLD AUTO: 262 X10*3/UL (ref 150–450)
POTASSIUM SERPL-SCNC: 3.7 MMOL/L (ref 3.5–5.3)
PROT SERPL-MCNC: 6.3 G/DL (ref 6.4–8.2)
PROT UR STRIP.AUTO-MCNC: NEGATIVE MG/DL
RBC # BLD AUTO: 4.41 X10*6/UL (ref 4–5.2)
RBC # UR STRIP.AUTO: NEGATIVE /UL
SODIUM SERPL-SCNC: 141 MMOL/L (ref 136–145)
SP GR UR STRIP.AUTO: 1.01
UROBILINOGEN UR STRIP.AUTO-MCNC: NORMAL MG/DL
WBC # BLD AUTO: 4.2 X10*3/UL (ref 4.4–11.3)

## 2025-01-26 PROCEDURE — 99284 EMERGENCY DEPT VISIT MOD MDM: CPT | Mod: 25 | Performed by: EMERGENCY MEDICINE

## 2025-01-26 PROCEDURE — 96375 TX/PRO/DX INJ NEW DRUG ADDON: CPT

## 2025-01-26 PROCEDURE — 80053 COMPREHEN METABOLIC PANEL: CPT | Performed by: EMERGENCY MEDICINE

## 2025-01-26 PROCEDURE — 96361 HYDRATE IV INFUSION ADD-ON: CPT

## 2025-01-26 PROCEDURE — 96365 THER/PROPH/DIAG IV INF INIT: CPT

## 2025-01-26 PROCEDURE — 2500000004 HC RX 250 GENERAL PHARMACY W/ HCPCS (ALT 636 FOR OP/ED): Performed by: EMERGENCY MEDICINE

## 2025-01-26 PROCEDURE — 81003 URINALYSIS AUTO W/O SCOPE: CPT | Performed by: EMERGENCY MEDICINE

## 2025-01-26 PROCEDURE — 2500000001 HC RX 250 WO HCPCS SELF ADMINISTERED DRUGS (ALT 637 FOR MEDICARE OP): Performed by: EMERGENCY MEDICINE

## 2025-01-26 PROCEDURE — 2500000004 HC RX 250 GENERAL PHARMACY W/ HCPCS (ALT 636 FOR OP/ED)

## 2025-01-26 PROCEDURE — 96376 TX/PRO/DX INJ SAME DRUG ADON: CPT

## 2025-01-26 PROCEDURE — 85025 COMPLETE CBC W/AUTO DIFF WBC: CPT | Performed by: EMERGENCY MEDICINE

## 2025-01-26 RX ORDER — ONDANSETRON HYDROCHLORIDE 2 MG/ML
INJECTION, SOLUTION INTRAVENOUS
Status: COMPLETED
Start: 2025-01-26 | End: 2025-01-26

## 2025-01-26 RX ORDER — HEPARIN 100 UNIT/ML
5 SYRINGE INTRAVENOUS ONCE
Status: COMPLETED | OUTPATIENT
Start: 2025-01-26 | End: 2025-01-26

## 2025-01-26 RX ORDER — MAGNESIUM SULFATE 1 G/100ML
1 INJECTION INTRAVENOUS ONCE
Status: COMPLETED | OUTPATIENT
Start: 2025-01-26 | End: 2025-01-26

## 2025-01-26 RX ORDER — ONDANSETRON HYDROCHLORIDE 2 MG/ML
4 INJECTION, SOLUTION INTRAVENOUS ONCE
Status: COMPLETED | OUTPATIENT
Start: 2025-01-26 | End: 2025-01-26

## 2025-01-26 RX ORDER — ACETAMINOPHEN 325 MG/1
650 TABLET ORAL ONCE
Status: COMPLETED | OUTPATIENT
Start: 2025-01-26 | End: 2025-01-26

## 2025-01-26 RX ADMIN — SODIUM CHLORIDE 500 ML: 9 INJECTION, SOLUTION INTRAVENOUS at 16:51

## 2025-01-26 RX ADMIN — MAGNESIUM SULFATE HEPTAHYDRATE 1 G: 1 INJECTION, SOLUTION INTRAVENOUS at 18:43

## 2025-01-26 RX ADMIN — ONDANSETRON 4 MG: 2 INJECTION INTRAMUSCULAR; INTRAVENOUS at 17:36

## 2025-01-26 RX ADMIN — Medication 500 UNITS: at 20:00

## 2025-01-26 RX ADMIN — ONDANSETRON 4 MG: 2 INJECTION INTRAMUSCULAR; INTRAVENOUS at 19:29

## 2025-01-26 RX ADMIN — ONDANSETRON HYDROCHLORIDE 4 MG: 2 INJECTION, SOLUTION INTRAVENOUS at 17:36

## 2025-01-26 RX ADMIN — ACETAMINOPHEN 650 MG: 325 TABLET ORAL at 17:05

## 2025-01-26 ASSESSMENT — PAIN DESCRIPTION - DESCRIPTORS
DESCRIPTORS: ACHING
DESCRIPTORS: ACHING
DESCRIPTORS_2: ACHING
DESCRIPTORS: ACHING
DESCRIPTORS_3: ACHING

## 2025-01-26 ASSESSMENT — LIFESTYLE VARIABLES
HAVE PEOPLE ANNOYED YOU BY CRITICIZING YOUR DRINKING: NO
HAVE YOU EVER FELT YOU SHOULD CUT DOWN ON YOUR DRINKING: NO
EVER FELT BAD OR GUILTY ABOUT YOUR DRINKING: NO
EVER HAD A DRINK FIRST THING IN THE MORNING TO STEADY YOUR NERVES TO GET RID OF A HANGOVER: NO
TOTAL SCORE: 0

## 2025-01-26 ASSESSMENT — PAIN DESCRIPTION - PAIN TYPE: TYPE: ACUTE PAIN

## 2025-01-26 ASSESSMENT — PAIN SCALES - GENERAL
PAINLEVEL_OUTOF10: 7
PAINLEVEL_OUTOF10: 6
PAINLEVEL_OUTOF10: 6

## 2025-01-26 ASSESSMENT — PAIN DESCRIPTION - LOCATION
LOCATION_3: BACK
LOCATION_2: ABDOMEN
LOCATION: HEAD
LOCATION: HEAD

## 2025-01-26 ASSESSMENT — PAIN DESCRIPTION - ORIENTATION
ORIENTATION_3: LOWER
ORIENTATION_2: LOWER

## 2025-01-26 ASSESSMENT — PAIN - FUNCTIONAL ASSESSMENT
PAIN_FUNCTIONAL_ASSESSMENT: 0-10
PAIN_FUNCTIONAL_ASSESSMENT: 0-10

## 2025-01-26 NOTE — ED PROVIDER NOTES
HPI   Chief Complaint   Patient presents with    Abdominal Pain     Diagnosed with UTI and started on Augmentin Thursday.  Worsening abd/back pain.         Patient presents to the emergency department secondary to abdominal pain, headache and nausea and vomiting.  Patient was seen earlier in the week at [facility] because of concern for possible shunt malfunction.  At that time she was diagnosed with a urinary tract infection but had negative workup for shunt malfunction.  Patient presents because of persistent abdominal pain nausea and vomiting.  She has been taking the antibiotics as prescribed.  No fever.  No change in bowel movements.  No other complaints at this time.                          Jay Coma Scale Score: 15                  Patient History   Past Medical History:   Diagnosis Date    Abnormal findings on diagnostic imaging of other abdominal regions, including retroperitoneum 10/14/2020    Abnormal CT of the abdomen    Acquired deformity of nose 03/24/2022    Nasal deformity    Acute upper respiratory infection, unspecified 10/16/2019    Acute URI    Adrenal disease (Multi)     Allergic     Allergy status to unspecified drugs, medicaments and biological substances 05/22/2020    History of drug allergy    Allergy status to unspecified drugs, medicaments and biological substances 11/13/2020    History of adverse drug reaction    Anemia     Anxiety 2005    Asthma     Benign intracranial hypertension 01/27/2022    Pseudotumor cerebri    Bipolar disorder, unspecified (Multi)     Bipolar disease, chronic    Breast calcification, right 08/21/2018    Cellulitis of abdominal wall 09/28/2022    Cellulitis of right abdominal wall    Cervicalgia 07/01/2020    Cervicalgia of [id number]-[id number]-ughaz region    Chronic maxillary sinusitis 01/04/2022    Chronic maxillary sinusitis    Chronic sialoadenitis 03/16/2020    Chronic sialoadenitis    COVID-19 01/06/2022    COVID-19 with multiple  comorbidities    Decreased white blood cell count, unspecified 11/04/2019    Leukopenia    Disease of thyroid gland     Disturbances of salivary secretion 03/16/2020    Xerostomia    Dry eye syndrome of bilateral lacrimal glands 10/07/2022    Dry eyes, bilateral    Encounter for preprocedural cardiovascular examination 02/01/2022    Preoperative cardiovascular examination    Food additives allergy status 06/11/2020    Allergy to food dye    Fracture of nasal bones, initial encounter for closed fracture 03/03/2022    Closed fracture of nasal bone, initial encounter    GERD (gastroesophageal reflux disease) 15 years old    Granuloma of right orbit 10/07/2021    Inflammatory pseudotumor of right orbit    History of endometrial ablation 11/09/2017    Hyperlipidemia     Hypertension     Hyperthyroidism     Hypoglycemia     Hypothyroidism     Immunocompromised     Localized swelling, mass and lump, head 03/24/2022    Swollen nose    Major depressive disorder, recurrent, in full remission (CMS-HCC) 10/07/2021    Depression, major, recurrent, in complete remission    Mammary duct ectasia of left breast 08/24/2022    Periductal mastitis of left breast    Meningitis ([facility]) 2008    Migraine     Nipple discharge 08/24/2022    Bloody discharge from left nipple    Ocular pain, right eye 10/07/2022    Pain in right eye    Optic atrophy     Other abnormal and inconclusive findings on diagnostic imaging of breast 07/06/2020    Other abnormal and inconclusive findings on diagnostic imaging of breast    Other anomalies of pupillary function 05/31/2019    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    Other chest pain 05/18/2020    Chest discomfort    Other conditions influencing health status 08/01/2022    History of cough    Other conditions influencing health status 08/03/2021    Chronic migraine    Other specified disorders of eustachian tube, left ear 11/18/2019    ETD (Eustachian tube dysfunction), left    Other specified  disorders of nose and nasal sinuses 03/24/2022    Nasal dryness    Other specified disorders of nose and nasal sinuses 03/24/2022    Nasal crusting    Pelvic and perineal pain 07/06/2020    Pelvic pain    Personal history of other diseases of the circulatory system 04/07/2020    History of sinus tachycardia    Personal history of other diseases of the circulatory system 04/07/2020    History of abnormal electrocardiography    Personal history of other diseases of the circulatory system     History of Raynaud's syndrome    Personal history of other diseases of the digestive system     History of irritable bowel syndrome    Personal history of other diseases of the digestive system 03/02/2020    History of oral pain    Personal history of other diseases of the musculoskeletal system and connective tissue 01/19/2022    History of neck pain    Personal history of other diseases of the musculoskeletal system and connective tissue 03/02/2021    History of scleroderma    Personal history of other diseases of the musculoskeletal system and connective tissue 06/16/2020    History of muscle weakness    Personal history of other diseases of the nervous system and sense organs 11/18/2019    History of hearing loss    Personal history of other diseases of the nervous system and sense organs 09/21/2022    History of partial seizures    Personal history of other diseases of the respiratory system 04/14/2021    History of asthma    Personal history of other endocrine, nutritional and metabolic disease 02/17/2021    History of diabetes mellitus    Personal history of other mental and behavioral disorders 05/27/2021    History of anxiety    Personal history of other specified conditions 09/07/2022    History of nipple discharge    Personal history of other specified conditions 10/16/2019    History of headache    Personal history of other specified conditions 09/28/2022    History of lump of left breast    Personal history of other  specified conditions 09/16/2021    History of persistent cough    Personal history of other specified conditions 02/01/2022    History of palpitations    Personal history of other specified conditions 03/09/2022    History of headache    Personal history of other specified conditions 02/12/2014    History of chest pain    Personal history of other specified conditions 10/27/2021    History of nausea and vomiting    Personal history of other specified conditions 10/16/2019    History of fatigue    Personal history of other specified conditions 02/26/2021    History of orthopnea    Personal history of other specified conditions 02/22/2021    History of shortness of breath    Personal history of urinary calculi     H/O renal calculi    Polycystic ovary syndrome     Postural orthostatic tachycardia syndrome (POTS)     POTS (postural orthostatic tachycardia syndrome)    Rash and other nonspecific skin eruption 03/15/2022    Rash    Repeated falls 06/23/2021    Recurrent falls    Right lower quadrant pain 10/14/2020    Abdominal pain, RLQ (right lower quadrant)    Seizures (Multi)     Sjogren syndrome, unspecified (Multi)     History of Sjogren's disease    Sjogren's syndrome     Sleep apnea     Slow transit constipation 07/09/2020    Slow transit constipation    Subarachnoid hemorrhage, traumatic (Multi) 04/19/2023    Thyroid nodule     Traumatic subarachnoid hemorrhage with loss of consciousness of unspecified duration, subsequent encounter 03/15/2022    Subarachnoid hemorrhage following injury, with loss of consciousness, subsequent encounter    Traumatic subarachnoid hemorrhage without loss of consciousness, subsequent encounter     Subarachnoid hemorrhage following injury, no loss of consciousness, subsequent encounter    Type 2 diabetes mellitus     Unspecified disorder of refraction 10/07/2022    Refractive error    Unspecified optic neuritis 11/06/2020    Right optic neuritis    Unspecified optic neuritis  11/06/2020    Optic neuritis, right    Unspecified visual loss 09/25/2019    Vision loss    Varicella As a child    Venous insufficiency (chronic) (peripheral) 10/18/2021    Chronic venous insufficiency of lower extremity    Viral infection, unspecified 01/11/2022    Nonspecific syndrome suggestive of viral illness    Vitamin D deficiency     Vitamin D deficiency, unspecified 09/28/2022    Vitamin D deficiency     Past Surgical History:   Procedure Laterality Date    APPENDECTOMY  2017    BRAIN SURGERY  [place] placement to measure pressure    CARDIAC CATHETERIZATION      CHOLECYSTECTOMY      ENDOMETRIAL ABLATION      FRACTURE SURGERY  12/2023    HERNIA REPAIR Right 03/01/2024    with mesh    HYSTERECTOMY  2017    MR HEAD ANGIO WO IV CONTRAST  02/08/2021    MR HEAD ANGIO WO IV CONTRAST 2/8/2021 [facility]ICAL [facility]    MR NECK ANGIO WO IV CONTRAST  02/08/2021    MR NECK ANGIO WO IV CONTRAST 2/8/2021 [facility] CLINICAL LEGAC[facility]    OTHER SURGICAL HISTORY  08/22/2019    Carpal tunnel surgery    OTHER SURGICAL HISTORY  08/22/2019    Hysterectomy    OTHER SURGICAL HISTORY  08/22/2019    Venous access port placement    OTHER SURGICAL HISTORY  08/22/2019    Cholecystectomy    OTHER SURGICAL HISTORY  08/22/2019    Appendectomy    OTHER SURGICAL HISTORY  08/22/2019    Pyloroplasty    WISDOM TOOTH EXTRACTION  2004     Family History   Problem Relation Name Age of Onset    Other (Perforated bowel) Mother Radha     Hypertension Mother Radha     Macular degeneration Mother Radha     Depression Mother Radha     Hyperlipidemia Mother Radha     Mental illness Mother Radha     Hyperlipidemia Father Mac     Hypertension Father Mac     Heart attack Father Mac     Other (S/P CABG) Father Mac     Diabetes Father Mac     Prostate cancer Father Mac     Coronary artery disease Father Mac     Heart failure Father Mac     Stroke Father Mac     Cancer Father Mac     Macular degeneration Father Mac     Retinal detachment  Father Mac     Asthma Father Mac     Hearing loss Father Mac     Heart disease Father Mac     Hernia Father Mac     Stroke Sister Jazmin     Breast cancer Sister Jazmin     Lupus Sister Jazmin     Ovarian cancer Sister Jazmin     Astigmatism Sister Jazmin     Arthritis Sister Jazmin     Asthma Sister Jazmin     Depression Sister Jazmin     Mental illness Sister Jazmin     Miscarriages / Stillbirths Sister Jazmin     Vision loss Sister Jazmin     Hyperlipidemia Brother Sanchez     Hypertension Brother Sanchez     Astigmatism Brother Sanchez     Arthritis Brother Sanchez     Asthma Brother Sanchez     Diabetes Father's Sister Linda     Blindness Father's Sister Linda     Vision loss Father's Sister Linda     Thyroid cancer Father's Sister Bekah     Thyroid disease Father's Sister Jessica     Colon cancer Father's Brother ?     Cancer Father's Brother Kian     Anesthesia related problems Father's Brother Kian     Depression Father's Brother Kian     Hernia Father's Brother Kian     Mental illness Father's Brother Kian     Cancer Maternal Grandmother Brenda     Ovarian cancer Maternal Grandmother Brenda     Blindness Other Multiple     Melanoma Other      Asthma Other      Other (hay fever) Other      Allergies Other      Alcohol abuse Paternal Grandfather Elkin     Arthritis Sister Isha     Asthma Sister Isha     Cancer Sister Isha     Depression Sister Isha     Mental illness Sister Isha     Miscarriages / Stillbirths Sister Isha     Ovarian cancer Sister Isha     Glaucoma Neg Hx       Social History     Tobacco Use    Smoking status: Never    Smokeless tobacco: Never   Vaping Use    Vaping status: Never Used   Substance Use Topics    Alcohol use: Not Currently     Comment: Socially in College    Drug use: Yes     Types: Benzodiazepines       Physical Exam   ED Triage Vitals [01/26/25 1558]   Temperature Heart Rate Respirations BP   36.9 °C (98.4 °F) 91 16 --      Pulse Ox Temp Source Heart Rate Source Patient Position   95 % Temporal -- --       BP Location FiO2 (%)     -- --       Physical Exam  Constitutional:       Appearance: Normal appearance. She is well-developed.   HENT:      Head: Normocephalic and atraumatic.      Right Ear: Tympanic membrane normal.      Left Ear: Tympanic membrane normal.      Nose: Nose normal.      Mouth/Throat:      Mouth: Mucous membranes are moist.   Eyes:      Extraocular Movements: Extraocular movements intact.   Cardiovascular:      Rate and Rhythm: Normal rate and regular rhythm.      Pulses: Normal pulses.      Heart sounds: Normal heart sounds.   Pulmonary:      Effort: Pulmonary effort is normal.      Breath sounds: Normal breath sounds. No wheezing, rhonchi or rales.   Abdominal:      General: Abdomen is flat. Bowel sounds are normal.      Palpations: Abdomen is soft.      Tenderness: There is generalized abdominal tenderness. There is no guarding or rebound.   Musculoskeletal:         General: Normal range of motion.   Skin:     General: Skin is warm and dry.      Capillary Refill: Capillary refill takes less than 2 seconds.   Neurological:      General: No focal deficit present.      Mental Status: She is alert and oriented to person, place, and time.   Psychiatric:         Mood and Affect: Mood normal.         Behavior: Behavior normal.       Labs Reviewed - No data to display  Pain Management Panel  More data exists         Latest Ref Rng & Units 4/30/2023 2/15/2023   Pain Management Panel   Amphetamine Screen, Urine NEGATIVE PRESUMPTIVE NEGATIVE  PRESUMPTIVE NEGATIVE    Barbiturate Screen, Urine NEGATIVE PRESUMPTIVE NEGATIVE  PRESUMPTIVE NEGATIVE    Fentanyl Screen, Urine NEGATIVE PRESUMPTIVE NEGATIVE  PRESUMPTIVE NEGATIVE    Methadone Screen, Urine NEGATIVE PRESUMPTIVE NEGATIVE  PRESUMPTIVE NEGATIVE      No orders to display     ED Course & MDM   Diagnoses as of 01/28/25 1526   Acute nonintractable headache, unspecified headache type   Generalized abdominal pain       Medical Decision Making  Patient  presents secondary to persistent abdominal pain nausea and vomiting.  I reviewed the patient's CT scans from her most recent visit.  These were negative for acute disease.  No evidence of shunt malfunction.  No evidence of intra-abdominal infection.  Repeat lab work was obtained along with urinalysis.  Lab work shows no evidence of kidney dysfunction or electrolyte abnormality.  Urinalysis at this time shows no evidence of infection so it is apparent that the urine antibiotic is likely clearing the infection.  At this time patient is hemodynamically stable.  She does not meet criteria for admission to the hospital.  Plan is for her to follow-up with her physicians or return here for new or worsening symptoms.        Procedure  Procedures     Xin Templeton MD  01/28/25 [id number]

## 2025-01-27 ENCOUNTER — TELEPHONE (OUTPATIENT)
Dept: PHARMACY | Facility: HOSPITAL | Age: 50
End: 2025-01-27
Payer: MEDICAID

## 2025-01-27 LAB — HOLD SPECIMEN: NORMAL

## 2025-01-27 NOTE — PROGRESS NOTES
EDPD Note: Antibiotics Reviewed and Warranted    Contacted Mr./Mrs./Ms. Jonathan Ayala regarding a positive urine culture/result that was taken during their recent emergency room visit. I completed education with patient . The patient is being treated appropriately with Augmentin 875-125 mg BID x 7days.     Pt was seen in ED for UTI and was started on Augmentin on Thursday. Today pt reported she was experiencing dizziness and pelvic. Pt reported she is immunodeficient and historically when she has colonization in her urine, she is always treated with abx. Pt has 2 days left of abx, advised to complete full course.     Susceptibility data from last 90 days.  Collected Specimen Info Organism Amoxicillin/Clavulanate Ampicillin Ampicillin/Sulbactam Cefazolin Cefazolin (uncomplicated UTIs only) Ciprofloxacin Gentamicin Nitrofurantoin Piperacillin/Tazobactam Trimethoprim/Sulfamethoxazole   01/23/25 Urine from Clean Catch/Voided Escherichia coli  S  R  S  S  S  S  S  S  S  R        No further follow up needed from EDPD Team.     Sasha Duggan [facility]

## 2025-01-28 ENCOUNTER — APPOINTMENT (OUTPATIENT)
Dept: RADIOLOGY | Facility: HOSPITAL | Age: 50
End: 2025-01-28
Payer: MEDICAID

## 2025-01-28 ENCOUNTER — HOSPITAL ENCOUNTER (INPATIENT)
Facility: HOSPITAL | Age: 50
LOS: 2 days | Discharge: HOME | End: 2025-01-31
Attending: EMERGENCY MEDICINE | Admitting: NEUROLOGICAL SURGERY
Payer: MEDICAID

## 2025-01-28 DIAGNOSIS — Z74.09 IMPAIRED FUNCTIONAL MOBILITY, BALANCE, GAIT, AND ENDURANCE: ICD-10-CM

## 2025-01-28 DIAGNOSIS — Z98.890 H/O VP SHUNT REVISION: ICD-10-CM

## 2025-01-28 DIAGNOSIS — Z98.2 VP (VENTRICULOPERITONEAL) SHUNT STATUS: ICD-10-CM

## 2025-01-28 DIAGNOSIS — G89.18 ACUTE POST-OPERATIVE PAIN: ICD-10-CM

## 2025-01-28 DIAGNOSIS — G93.2 IIH (IDIOPATHIC INTRACRANIAL HYPERTENSION): ICD-10-CM

## 2025-01-28 DIAGNOSIS — R51.9 NONINTRACTABLE HEADACHE, UNSPECIFIED CHRONICITY PATTERN, UNSPECIFIED HEADACHE TYPE: Primary | ICD-10-CM

## 2025-01-28 LAB
ALBUMIN SERPL BCP-MCNC: 4.4 G/DL (ref 3.4–5)
ALP SERPL-CCNC: 95 U/L (ref 33–110)
ALT SERPL W P-5'-P-CCNC: 22 U/L (ref 7–45)
ANION GAP SERPL CALC-SCNC: 13 MMOL/L (ref 10–20)
APTT PPP: 34 SECONDS (ref 27–38)
AST SERPL W P-5'-P-CCNC: 16 U/L (ref 9–39)
BASOPHILS # BLD AUTO: 0.04 X10*3/UL (ref 0–0.1)
BASOPHILS NFR BLD AUTO: 0.6 %
BILIRUB SERPL-MCNC: 0.4 MG/DL (ref 0–1.2)
BUN SERPL-MCNC: 15 MG/DL (ref 6–23)
CALCIUM SERPL-MCNC: 9.7 MG/DL (ref 8.6–10.6)
CHLORIDE SERPL-SCNC: 102 MMOL/L (ref 98–107)
CO2 SERPL-SCNC: 30 MMOL/L (ref 21–32)
CREAT SERPL-MCNC: 0.92 MG/DL (ref 0.5–1.05)
CRP SERPL-MCNC: 0.34 MG/DL
EGFRCR SERPLBLD CKD-EPI 2021: 77 ML/MIN/1.73M*2
EOSINOPHIL # BLD AUTO: 0.05 X10*3/UL (ref 0–0.7)
EOSINOPHIL NFR BLD AUTO: 0.8 %
ERYTHROCYTE [DISTWIDTH] IN BLOOD BY AUTOMATED COUNT: 15.2 % (ref 11.5–14.5)
ERYTHROCYTE [SEDIMENTATION RATE] IN BLOOD BY WESTERGREN METHOD: 24 MM/H (ref 0–20)
FLUAV RNA RESP QL NAA+PROBE: NOT DETECTED
FLUBV RNA RESP QL NAA+PROBE: NOT DETECTED
GLUCOSE CSF-MCNC: 72 MG/DL (ref 40–70)
GLUCOSE SERPL-MCNC: 112 MG/DL (ref 74–99)
HCT VFR BLD AUTO: 37.5 % (ref 36–46)
HGB BLD-MCNC: 11.7 G/DL (ref 12–16)
IMM GRANULOCYTES # BLD AUTO: 0.02 X10*3/UL (ref 0–0.7)
IMM GRANULOCYTES NFR BLD AUTO: 0.3 % (ref 0–0.9)
INR PPP: 1 (ref 0.9–1.1)
LYMPHOCYTES # BLD AUTO: 1.63 X10*3/UL (ref 1.2–4.8)
LYMPHOCYTES NFR BLD AUTO: 25.7 %
MCH RBC QN AUTO: 26.5 PG (ref 26–34)
MCHC RBC AUTO-ENTMCNC: 31.2 G/DL (ref 32–36)
MCV RBC AUTO: 85 FL (ref 80–100)
MONOCYTES # BLD AUTO: 0.45 X10*3/UL (ref 0.1–1)
MONOCYTES NFR BLD AUTO: 7.1 %
NEUTROPHILS # BLD AUTO: 4.15 X10*3/UL (ref 1.2–7.7)
NEUTROPHILS NFR BLD AUTO: 65.5 %
NRBC BLD-RTO: 0 /100 WBCS (ref 0–0)
PLATELET # BLD AUTO: 297 X10*3/UL (ref 150–450)
POTASSIUM SERPL-SCNC: 4.1 MMOL/L (ref 3.5–5.3)
PROT CSF-MCNC: 142 MG/DL (ref 15–45)
PROT SERPL-MCNC: 7.5 G/DL (ref 6.4–8.2)
PROTHROMBIN TIME: 11.4 SECONDS (ref 9.8–12.8)
RBC # BLD AUTO: 4.41 X10*6/UL (ref 4–5.2)
RSV RNA RESP QL NAA+PROBE: NOT DETECTED
SARS-COV-2 RNA RESP QL NAA+PROBE: NOT DETECTED
SODIUM SERPL-SCNC: 141 MMOL/L (ref 136–145)
WBC # BLD AUTO: 6.3 X10*3/UL (ref 4.4–11.3)

## 2025-01-28 PROCEDURE — 71045 X-RAY EXAM CHEST 1 VIEW: CPT

## 2025-01-28 PROCEDURE — G0378 HOSPITAL OBSERVATION PER HR: HCPCS

## 2025-01-28 PROCEDURE — 99285 EMERGENCY DEPT VISIT HI MDM: CPT | Mod: 25 | Performed by: EMERGENCY MEDICINE

## 2025-01-28 PROCEDURE — 2500000001 HC RX 250 WO HCPCS SELF ADMINISTERED DRUGS (ALT 637 FOR MEDICARE OP): Mod: SE

## 2025-01-28 PROCEDURE — 2500000004 HC RX 250 GENERAL PHARMACY W/ HCPCS (ALT 636 FOR OP/ED): Mod: SE

## 2025-01-28 PROCEDURE — 70250 X-RAY EXAM OF SKULL: CPT | Performed by: RADIOLOGY

## 2025-01-28 PROCEDURE — 85025 COMPLETE CBC W/AUTO DIFF WBC: CPT | Performed by: PHYSICIAN ASSISTANT

## 2025-01-28 PROCEDURE — 96372 THER/PROPH/DIAG INJ SC/IM: CPT

## 2025-01-28 PROCEDURE — 87637 SARSCOV2&INF A&B&RSV AMP PRB: CPT | Performed by: PHYSICIAN ASSISTANT

## 2025-01-28 PROCEDURE — 84157 ASSAY OF PROTEIN OTHER: CPT

## 2025-01-28 PROCEDURE — 74018 RADEX ABDOMEN 1 VIEW: CPT | Performed by: RADIOLOGY

## 2025-01-28 PROCEDURE — 89051 BODY FLUID CELL COUNT: CPT

## 2025-01-28 PROCEDURE — 86140 C-REACTIVE PROTEIN: CPT

## 2025-01-28 PROCEDURE — 80053 COMPREHEN METABOLIC PANEL: CPT | Performed by: PHYSICIAN ASSISTANT

## 2025-01-28 PROCEDURE — 36415 COLL VENOUS BLD VENIPUNCTURE: CPT | Performed by: PHYSICIAN ASSISTANT

## 2025-01-28 PROCEDURE — 009U3ZX DRAINAGE OF SPINAL CANAL, PERCUTANEOUS APPROACH, DIAGNOSTIC: ICD-10-PCS

## 2025-01-28 PROCEDURE — 36415 COLL VENOUS BLD VENIPUNCTURE: CPT

## 2025-01-28 PROCEDURE — 96365 THER/PROPH/DIAG IV INF INIT: CPT | Mod: 59

## 2025-01-28 PROCEDURE — 70450 CT HEAD/BRAIN W/O DYE: CPT

## 2025-01-28 PROCEDURE — 82945 GLUCOSE OTHER FLUID: CPT

## 2025-01-28 PROCEDURE — 71045 X-RAY EXAM CHEST 1 VIEW: CPT | Performed by: RADIOLOGY

## 2025-01-28 PROCEDURE — 85652 RBC SED RATE AUTOMATED: CPT

## 2025-01-28 PROCEDURE — 87070 CULTURE OTHR SPECIMN AEROBIC: CPT

## 2025-01-28 PROCEDURE — 70450 CT HEAD/BRAIN W/O DYE: CPT | Performed by: RADIOLOGY

## 2025-01-28 PROCEDURE — 85610 PROTHROMBIN TIME: CPT

## 2025-01-28 PROCEDURE — 99285 EMERGENCY DEPT VISIT HI MDM: CPT | Performed by: EMERGENCY MEDICINE

## 2025-01-28 RX ORDER — FLUTICASONE FUROATE AND VILANTEROL 200; 25 UG/1; UG/1
1 POWDER RESPIRATORY (INHALATION)
Status: DISCONTINUED | OUTPATIENT
Start: 2025-01-29 | End: 2025-01-31 | Stop reason: HOSPADM

## 2025-01-28 RX ORDER — IPRATROPIUM BROMIDE AND ALBUTEROL SULFATE 2.5; .5 MG/3ML; MG/3ML
3 SOLUTION RESPIRATORY (INHALATION) 4 TIMES DAILY PRN
Status: DISCONTINUED | OUTPATIENT
Start: 2025-01-28 | End: 2025-01-30

## 2025-01-28 RX ORDER — AZELASTINE 1 MG/ML
1 SPRAY, METERED NASAL 2 TIMES DAILY
Status: DISCONTINUED | OUTPATIENT
Start: 2025-01-28 | End: 2025-01-31 | Stop reason: HOSPADM

## 2025-01-28 RX ORDER — LACOSAMIDE 100 MG/1
300 TABLET ORAL 2 TIMES DAILY
Status: DISCONTINUED | OUTPATIENT
Start: 2025-01-28 | End: 2025-01-31 | Stop reason: HOSPADM

## 2025-01-28 RX ORDER — PROPRANOLOL HYDROCHLORIDE 60 MG/1
60 CAPSULE, EXTENDED RELEASE ORAL DAILY
Status: DISCONTINUED | OUTPATIENT
Start: 2025-01-29 | End: 2025-01-31 | Stop reason: HOSPADM

## 2025-01-28 RX ORDER — ONDANSETRON 4 MG/1
4 TABLET, FILM COATED ORAL EVERY 8 HOURS PRN
Status: DISCONTINUED | OUTPATIENT
Start: 2025-01-28 | End: 2025-01-30

## 2025-01-28 RX ORDER — ACETAMINOPHEN 325 MG/1
650 TABLET ORAL EVERY 6 HOURS SCHEDULED
Status: DISCONTINUED | OUTPATIENT
Start: 2025-01-28 | End: 2025-01-30

## 2025-01-28 RX ORDER — CALCIUM CARB/VITAMIN D3/VIT K1 650MG-12.5
2 TABLET,CHEWABLE ORAL DAILY
Status: DISCONTINUED | OUTPATIENT
Start: 2025-01-29 | End: 2025-01-28

## 2025-01-28 RX ORDER — NALOXONE HYDROCHLORIDE 0.4 MG/ML
0.2 INJECTION, SOLUTION INTRAMUSCULAR; INTRAVENOUS; SUBCUTANEOUS EVERY 5 MIN PRN
Status: DISCONTINUED | OUTPATIENT
Start: 2025-01-28 | End: 2025-01-30

## 2025-01-28 RX ORDER — FUROSEMIDE 20 MG/1
20 TABLET ORAL 2 TIMES DAILY
Status: DISCONTINUED | OUTPATIENT
Start: 2025-01-28 | End: 2025-01-31 | Stop reason: HOSPADM

## 2025-01-28 RX ORDER — PSYLLIUM HUSK 0.4 G
1 CAPSULE ORAL DAILY
Status: DISCONTINUED | OUTPATIENT
Start: 2025-01-29 | End: 2025-01-31 | Stop reason: HOSPADM

## 2025-01-28 RX ORDER — SENNOSIDES 8.6 MG/1
1 TABLET ORAL NIGHTLY
Status: DISCONTINUED | OUTPATIENT
Start: 2025-01-28 | End: 2025-01-29

## 2025-01-28 RX ORDER — PANTOPRAZOLE SODIUM 40 MG/1
40 TABLET, DELAYED RELEASE ORAL
Status: DISCONTINUED | OUTPATIENT
Start: 2025-01-29 | End: 2025-01-31 | Stop reason: HOSPADM

## 2025-01-28 RX ORDER — CLONAZEPAM 0.5 MG/1
0.5 TABLET ORAL 2 TIMES DAILY
Status: DISCONTINUED | OUTPATIENT
Start: 2025-01-28 | End: 2025-01-31 | Stop reason: HOSPADM

## 2025-01-28 RX ORDER — LIDOCAINE HYDROCHLORIDE 10 MG/ML
10 INJECTION, SOLUTION EPIDURAL; INFILTRATION; INTRACAUDAL; PERINEURAL ONCE
Status: DISCONTINUED | OUTPATIENT
Start: 2025-01-28 | End: 2025-01-30

## 2025-01-28 RX ORDER — AMITRIPTYLINE HYDROCHLORIDE 100 MG/1
100 TABLET ORAL NIGHTLY
Status: DISCONTINUED | OUTPATIENT
Start: 2025-01-28 | End: 2025-01-31 | Stop reason: HOSPADM

## 2025-01-28 RX ORDER — DIVALPROEX SODIUM 500 MG/1
500 TABLET, FILM COATED, EXTENDED RELEASE ORAL 2 TIMES DAILY
Status: DISCONTINUED | OUTPATIENT
Start: 2025-01-28 | End: 2025-01-31 | Stop reason: HOSPADM

## 2025-01-28 RX ORDER — ONDANSETRON HYDROCHLORIDE 2 MG/ML
4 INJECTION, SOLUTION INTRAVENOUS EVERY 8 HOURS PRN
Status: DISCONTINUED | OUTPATIENT
Start: 2025-01-28 | End: 2025-01-30

## 2025-01-28 RX ORDER — HYDROCORTISONE 10 MG/1
10 TABLET ORAL DAILY
Status: DISCONTINUED | OUTPATIENT
Start: 2025-01-29 | End: 2025-01-31 | Stop reason: HOSPADM

## 2025-01-28 RX ORDER — OXYCODONE HYDROCHLORIDE 5 MG/1
5 TABLET ORAL EVERY 4 HOURS PRN
Status: DISCONTINUED | OUTPATIENT
Start: 2025-01-28 | End: 2025-01-30

## 2025-01-28 RX ORDER — OXYCODONE HYDROCHLORIDE 10 MG/1
10 TABLET ORAL EVERY 4 HOURS PRN
Status: DISCONTINUED | OUTPATIENT
Start: 2025-01-28 | End: 2025-01-30

## 2025-01-28 RX ORDER — LEVOTHYROXINE SODIUM 50 UG/1
75 TABLET ORAL
Status: DISCONTINUED | OUTPATIENT
Start: 2025-01-29 | End: 2025-01-31 | Stop reason: HOSPADM

## 2025-01-28 RX ORDER — TIZANIDINE 4 MG/1
2 TABLET ORAL 2 TIMES DAILY
Status: DISCONTINUED | OUTPATIENT
Start: 2025-01-28 | End: 2025-01-31 | Stop reason: HOSPADM

## 2025-01-28 RX ORDER — MAGNESIUM SULFATE 1 G/100ML
1 INJECTION INTRAVENOUS ONCE
Status: COMPLETED | OUTPATIENT
Start: 2025-01-28 | End: 2025-01-28

## 2025-01-28 RX ORDER — DICLOFENAC SODIUM 50 MG/1
50 TABLET, DELAYED RELEASE ORAL 3 TIMES DAILY PRN
Status: DISCONTINUED | OUTPATIENT
Start: 2025-01-28 | End: 2025-01-30

## 2025-01-28 RX ORDER — MIDAZOLAM HYDROCHLORIDE 1 MG/ML
2 INJECTION INTRAMUSCULAR; INTRAVENOUS ONCE
Status: DISCONTINUED | OUTPATIENT
Start: 2025-01-28 | End: 2025-01-28

## 2025-01-28 RX ORDER — POLYETHYLENE GLYCOL 3350 17 G/17G
17 POWDER, FOR SOLUTION ORAL 2 TIMES DAILY
Status: DISCONTINUED | OUTPATIENT
Start: 2025-01-28 | End: 2025-01-30

## 2025-01-28 RX ORDER — FLUTICASONE PROPIONATE 50 MCG
1 SPRAY, SUSPENSION (ML) NASAL DAILY
Status: DISCONTINUED | OUTPATIENT
Start: 2025-01-29 | End: 2025-01-31 | Stop reason: HOSPADM

## 2025-01-28 RX ORDER — ACETAMINOPHEN 325 MG/1
975 TABLET ORAL ONCE
Status: COMPLETED | OUTPATIENT
Start: 2025-01-28 | End: 2025-01-28

## 2025-01-28 RX ORDER — CARBOXYMETHYLCELLULOSE SODIUM 10 MG/ML
2 GEL OPHTHALMIC 3 TIMES DAILY PRN
Status: DISCONTINUED | OUTPATIENT
Start: 2025-01-28 | End: 2025-01-28

## 2025-01-28 RX ORDER — LORAZEPAM 2 MG/ML
2 INJECTION INTRAMUSCULAR ONCE
Status: COMPLETED | OUTPATIENT
Start: 2025-01-28 | End: 2025-01-28

## 2025-01-28 RX ORDER — CHOLECALCIFEROL (VITAMIN D3) 25 MCG
5000 TABLET ORAL DAILY
Status: DISCONTINUED | OUTPATIENT
Start: 2025-01-29 | End: 2025-01-31 | Stop reason: HOSPADM

## 2025-01-28 RX ORDER — FOLIC ACID 1 MG/1
1 TABLET ORAL DAILY
Status: DISCONTINUED | OUTPATIENT
Start: 2025-01-29 | End: 2025-01-31 | Stop reason: HOSPADM

## 2025-01-28 RX ORDER — ROPINIROLE 0.5 MG/1
0.5 TABLET, FILM COATED ORAL 2 TIMES DAILY
Status: DISCONTINUED | OUTPATIENT
Start: 2025-01-28 | End: 2025-01-31 | Stop reason: HOSPADM

## 2025-01-28 RX ORDER — HEPARIN SODIUM 5000 [USP'U]/ML
7500 INJECTION, SOLUTION INTRAVENOUS; SUBCUTANEOUS EVERY 8 HOURS SCHEDULED
Status: DISCONTINUED | OUTPATIENT
Start: 2025-01-28 | End: 2025-01-30

## 2025-01-28 RX ORDER — LIDOCAINE HYDROCHLORIDE 10 MG/ML
INJECTION, SOLUTION INFILTRATION; PERINEURAL
Status: COMPLETED
Start: 2025-01-28 | End: 2025-01-28

## 2025-01-28 RX ORDER — HYDROMORPHONE HYDROCHLORIDE 1 MG/ML
0.4 INJECTION, SOLUTION INTRAMUSCULAR; INTRAVENOUS; SUBCUTANEOUS ONCE
Status: DISCONTINUED | OUTPATIENT
Start: 2025-01-28 | End: 2025-01-28

## 2025-01-28 RX ORDER — LEVETIRACETAM 750 MG/1
1500 TABLET ORAL 2 TIMES DAILY
Status: DISCONTINUED | OUTPATIENT
Start: 2025-01-28 | End: 2025-01-31 | Stop reason: HOSPADM

## 2025-01-28 RX ORDER — AMOXICILLIN AND CLAVULANATE POTASSIUM 875; 125 MG/1; MG/1
1 TABLET, FILM COATED ORAL EVERY 12 HOURS
Status: DISCONTINUED | OUTPATIENT
Start: 2025-01-28 | End: 2025-01-30

## 2025-01-28 RX ORDER — LORAZEPAM 2 MG/ML
INJECTION INTRAMUSCULAR
Status: COMPLETED
Start: 2025-01-28 | End: 2025-01-28

## 2025-01-28 RX ORDER — EZETIMIBE 10 MG/1
10 TABLET ORAL DAILY
Status: DISCONTINUED | OUTPATIENT
Start: 2025-01-29 | End: 2025-01-31 | Stop reason: HOSPADM

## 2025-01-28 RX ORDER — MONTELUKAST SODIUM 10 MG/1
10 TABLET ORAL NIGHTLY
Status: DISCONTINUED | OUTPATIENT
Start: 2025-01-28 | End: 2025-01-31 | Stop reason: HOSPADM

## 2025-01-28 RX ORDER — AMOXICILLIN 250 MG
2 CAPSULE ORAL 2 TIMES DAILY
Status: DISCONTINUED | OUTPATIENT
Start: 2025-01-28 | End: 2025-01-31 | Stop reason: HOSPADM

## 2025-01-28 RX ADMIN — HEPARIN SODIUM 7500 UNITS: 5000 INJECTION INTRAVENOUS; SUBCUTANEOUS at 19:18

## 2025-01-28 RX ADMIN — MONTELUKAST 10 MG: 10 TABLET, FILM COATED ORAL at 23:02

## 2025-01-28 RX ADMIN — FUROSEMIDE 20 MG: 20 TABLET ORAL at 23:02

## 2025-01-28 RX ADMIN — CLONAZEPAM 0.5 MG: 0.5 TABLET ORAL at 23:02

## 2025-01-28 RX ADMIN — LORAZEPAM 2 MG: 2 INJECTION INTRAMUSCULAR; INTRAVENOUS at 22:48

## 2025-01-28 RX ADMIN — MAGNESIUM SULFATE HEPTAHYDRATE 1 G: 1 INJECTION, SOLUTION INTRAVENOUS at 11:59

## 2025-01-28 RX ADMIN — LACOSAMIDE 300 MG: 100 TABLET, FILM COATED ORAL at 23:02

## 2025-01-28 RX ADMIN — LEVETIRACETAM 1500 MG: 750 TABLET, FILM COATED ORAL at 23:02

## 2025-01-28 RX ADMIN — LORAZEPAM 2 MG: 2 INJECTION INTRAMUSCULAR at 22:48

## 2025-01-28 RX ADMIN — POLYETHYLENE GLYCOL 3350 17 G: 17 POWDER, FOR SOLUTION ORAL at 22:45

## 2025-01-28 RX ADMIN — LIDOCAINE HYDROCHLORIDE: 10 INJECTION, SOLUTION INFILTRATION; PERINEURAL at 22:47

## 2025-01-28 RX ADMIN — ACETAMINOPHEN 975 MG: 325 TABLET ORAL at 12:00

## 2025-01-28 RX ADMIN — SODIUM CHLORIDE 1000 ML: 9 INJECTION, SOLUTION INTRAVENOUS at 12:00

## 2025-01-28 SDOH — SOCIAL STABILITY: SOCIAL INSECURITY: HAVE YOU HAD ANY THOUGHTS OF HARMING ANYONE ELSE?: NO

## 2025-01-28 SDOH — SOCIAL STABILITY: SOCIAL INSECURITY: HAVE YOU HAD THOUGHTS OF HARMING ANYONE ELSE?: NO

## 2025-01-28 SDOH — SOCIAL STABILITY: SOCIAL INSECURITY: ARE YOU OR HAVE YOU BEEN THREATENED OR ABUSED PHYSICALLY, EMOTIONALLY, OR SEXUALLY BY ANYONE?: NO

## 2025-01-28 SDOH — SOCIAL STABILITY: SOCIAL INSECURITY: DOES ANYONE TRY TO KEEP YOU FROM HAVING/CONTACTING OTHER FRIENDS OR DOING THINGS OUTSIDE YOUR HOME?: NO

## 2025-01-28 SDOH — SOCIAL STABILITY: SOCIAL INSECURITY: DO YOU FEEL ANYONE HAS EXPLOITED OR TAKEN ADVANTAGE OF YOU FINANCIALLY OR OF YOUR PERSONAL PROPERTY?: NO

## 2025-01-28 SDOH — SOCIAL STABILITY: SOCIAL INSECURITY: WERE YOU ABLE TO COMPLETE ALL THE BEHAVIORAL HEALTH SCREENINGS?: YES

## 2025-01-28 SDOH — SOCIAL STABILITY: SOCIAL INSECURITY: HAS ANYONE EVER THREATENED TO HURT YOUR FAMILY OR YOUR PETS?: NO

## 2025-01-28 SDOH — SOCIAL STABILITY: SOCIAL INSECURITY: DO YOU FEEL UNSAFE GOING BACK TO THE PLACE WHERE YOU ARE LIVING?: NO

## 2025-01-28 SDOH — SOCIAL STABILITY: SOCIAL INSECURITY: ABUSE: ADULT

## 2025-01-28 SDOH — SOCIAL STABILITY: SOCIAL INSECURITY: ARE THERE ANY APPARENT SIGNS OF INJURIES/BEHAVIORS THAT COULD BE RELATED TO ABUSE/NEGLECT?: NO

## 2025-01-28 ASSESSMENT — ACTIVITIES OF DAILY LIVING (ADL)
JUDGMENT_ADEQUATE_SAFELY_COMPLETE_DAILY_ACTIVITIES: YES
ADEQUATE_TO_COMPLETE_ADL: YES
BATHING: INDEPENDENT
DRESSING YOURSELF: INDEPENDENT
FEEDING YOURSELF: INDEPENDENT
HEARING - LEFT EAR: FUNCTIONAL
LACK_OF_TRANSPORTATION: NO
TOILETING: INDEPENDENT
GROOMING: INDEPENDENT
HEARING - RIGHT EAR: FUNCTIONAL
PATIENT'S MEMORY ADEQUATE TO SAFELY COMPLETE DAILY ACTIVITIES?: YES
WALKS IN HOME: NEEDS ASSISTANCE

## 2025-01-28 ASSESSMENT — LIFESTYLE VARIABLES
HOW OFTEN DO YOU HAVE A DRINK CONTAINING ALCOHOL: NEVER
AUDIT-C TOTAL SCORE: 0
AUDIT-C TOTAL SCORE: 0
HOW MANY STANDARD DRINKS CONTAINING ALCOHOL DO YOU HAVE ON A TYPICAL DAY: PATIENT DOES NOT DRINK
SKIP TO QUESTIONS 9-10: 1
HOW OFTEN DO YOU HAVE 6 OR MORE DRINKS ON ONE OCCASION: NEVER

## 2025-01-28 ASSESSMENT — PAIN DESCRIPTION - DESCRIPTORS: DESCRIPTORS: PRESSURE

## 2025-01-28 ASSESSMENT — COGNITIVE AND FUNCTIONAL STATUS - GENERAL
MOBILITY SCORE: 24
DAILY ACTIVITIY SCORE: 24
PATIENT BASELINE BEDBOUND: NO

## 2025-01-28 ASSESSMENT — PAIN DESCRIPTION - LOCATION: LOCATION: HEAD

## 2025-01-28 ASSESSMENT — PAIN - FUNCTIONAL ASSESSMENT
PAIN_FUNCTIONAL_ASSESSMENT: 0-10
PAIN_FUNCTIONAL_ASSESSMENT: 0-10

## 2025-01-28 ASSESSMENT — PAIN SCALES - GENERAL
PAINLEVEL_OUTOF10: 6
PAINLEVEL_OUTOF10: 6

## 2025-01-28 NOTE — ED PROVIDER NOTES
"Emergency Department Provider Note        History of Present Illness     History provided by: Patient  Limitations to History: None    HPI:  Jonathan Ayala is a 49 y.o. 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  She presented to  ED 6 days ago for a headache and was given Tylenol, fluids, and magnesium which resolved her symptoms. Shunt studies were normal, CTH without ventriculomegaly. Once she returned home, her headache returned to she went to the [place] ED and given more medications, although they did not help with her headache. She has been taking her home migraine medications which have not been helping. Last night, she noticed that her vision went completely dark while she was trying to eat and she had a \"whooshing\" sound in her right ear. At baseline, she has minimal vision out of her left eye and is able to differentiate shapes with her right eye. Her  later noticed yellow drainage from her  shunt site. Her vision and hearing changes, and headache resolved after the drainage from her scalp. She reports that her scalp is \"stinging\" and that it felt \"soft\" yesterday.  She endorses cough and fatigue for the last few days, but denies other URI symptoms. She presented to the ED 2 days ago for worsening abdominal pain iso UTI and was given tylenol and zofran. She reports her pain has resolved.    Physical Exam   Triage vitals:  T 36.6 °C (97.9 °F)    /88  RR 16  O2 97 % None (Room air)    Physical Exam  Constitutional:       General: She is not in acute distress.     Appearance: Normal appearance.   HENT:      Head: Normocephalic.      Comments:  shunt scar without erythema, tenderness     Nose: No congestion or rhinorrhea. "   Eyes:      General:         Right eye: No discharge.         Left eye: No discharge.      Pupils: Pupils are equal, round, and reactive to light.   Pulmonary:      Effort: Pulmonary effort is normal.   Musculoskeletal:      Cervical back: No rigidity.   Neurological:      Mental Status: She is alert and oriented to person, place, and time.   Psychiatric:         Mood and Affect: Mood normal.         Behavior: Behavior normal.          Medical Decision Making & ED Course   Medical Decision Making:  Jonathan Ayala is a 49 y.o. 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 Given history of  shunt and symptoms associated with elevated ICP,  shunt series and CT head obtained due to concern for shunt malfunction. Imaging demonstrated no hydrocephalus and no evidence of  catheter malfunction. Neurosurgery and ophthalmology consulted for further recommendations. Headache managed with IV fluids, Magnesium, and Tylenol. CBC notable for mild anemia (Hgb 11.7), CMP unremarkable.  PCR for RSV, COVID, and flu were obtained due to recent history of cough and fatigue and were all negative.    Differential diagnoses considered include but are not limited to:  shunt malfunction, migraine, acute infectious process including meningitis, and ICH    EKG Independent Interpretation: EKG not obtained    Chronic conditions affecting the patient's care: As documented above in MDM    The patient was discussed with the following consultants/services: Neurosurgery and ophthalmology     ED Course:  ED Course as of 01/30/25 1728   Tue Jan 28, 2025   1123 XR shunt series  IMPRESSION:  Right sided ventriculostomy catheter without kinking or discontinuity.   [MH]   1512 Sedimentation rate,  automated(!)  ESR elevated at 24. [MH]   1512 C-reactive protein  CRP normal at 0.34. [MH]   1513 Comprehensive metabolic panel(!)  Metabolic panel unremarkable. [MH]   1513 CBC and Auto Differential(!)  CBC without leukocytosis and mild anemia with a hemoglobin 11.7. [MH]      ED Course User Index  [MH] Ben Cartagena MD         Diagnoses as of 01/30/25 1728   Nonintractable headache, unspecified chronicity pattern, unspecified headache type     Disposition   CT head did not display an acute intracranial process. Neurosurgery note they will admit the patient to their service to undergo LP with IR.  Discussed ED findings and admission with the patient.  Patient stated understanding and agreement with the plan.  As a result of their workup, the patient will require admission to the hospital.  The patient was informed of her diagnosis.  The patient was given the opportunity to ask questions and I answered them. The patient agreed to be admitted to the hospital.    Procedures   Procedures    Patient seen and discussed with ED attending physician.    Monica Garcia, MS-3        In agreement with above.  Edited as needed.  Ben Cartagena MD  Emergency medicine PGY 3     Ben Cartagena MD  Resident  01/30/25 2913

## 2025-01-28 NOTE — CONSULTS
Reason For Consult  Vision changes, leaking from shunt     History Of Present Illness  Jonathan Ayala is a 49 y.o. female with history of left non-arteritic anterior ischemic optic neuropathy, bilateral sequential vision loss with right eye improvement 11/13/2019, idiopathic intracranial hypertension status post ventriculoperitoneal shunt last revised 11/15/2024, seizures, scleroderma, Sjogren, CVID on monthly IVIG, POTS, HTN, DM2, hypothyroidism, BRENT on CPAP, migraine presenting with vision changes and shunt leak. Ophthalmology consulted for optic nerve evaluation. She follows with Dr. Mederos (neuro-ophthalmology) and was last seen on 12/16/24. Last evaluated by ophthalmology (consult service) on 1/23.     She reports that yesterday, vision went dark in both eyes, and she had a headache and pulsatile tinnitus. This morning, she noticed leaking from her scalp, and with the leaking, her vision symptoms, headache, and tinnitus have improved.      Past Medical History  She has a past medical history of Abnormal findings on diagnostic imaging of other abdominal regions, including retroperitoneum (10/14/2020), Acquired deformity of nose (03/24/2022), Acute upper respiratory infection, unspecified (10/16/2019), Adrenal disease (Multi), Allergic, Allergy status to unspecified drugs, medicaments and biological substances (05/22/2020), Allergy status to unspecified drugs, medicaments and biological substances (11/13/2020), Anemia, Anxiety (2005), Asthma, Benign intracranial hypertension (01/27/2022), Bipolar disorder, unspecified (Multi), Breast calcification, right (08/21/2018), Cellulitis of abdominal wall (09/28/2022), Cervicalgia (07/01/2020), Chronic maxillary sinusitis (01/04/2022), Chronic sialoadenitis (03/16/2020), COVID-19 (01/06/2022), Decreased white blood cell count, unspecified (11/04/2019), Disease of thyroid gland, Disturbances of salivary secretion (03/16/2020), Dry eye syndrome of bilateral lacrimal glands  (10/07/2022), Encounter for preprocedural cardiovascular examination (02/01/2022), Food additives allergy status (06/11/2020), Fracture of nasal bones, initial encounter for closed fracture (03/03/2022), GERD (gastroesophageal reflux disease) (15 years old), Granuloma of right orbit (10/07/2021), History of endometrial ablation (11/09/2017), Hyperlipidemia, Hypertension, Hyperthyroidism, Hypoglycemia, Hypothyroidism, Immunocompromised, Localized swelling, mass and lump, head (03/24/2022), Major depressive disorder, recurrent, in full remission (CMS-HCC) (10/07/2021), 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), Meningitis ([facility]) (2008), Migraine, Nipple discharge (08/24/2022), Ocular pain, right eye (10/07/2022), Optic atrophy, Other abnormal and inconclusive findings on diagnostic imaging of breast (07/06/2020), Other anomalies of pupillary function (05/31/2019), Other chest pain (05/18/2020), Other conditions influencing health status (08/01/2022), Other conditions influencing health status (08/03/2021), Other specified disorders of eustachian tube, left ear (11/18/2019), Other specified disorders of nose and nasal sinuses (03/24/2022), Other specified disorders of nose and nasal sinuses (03/24/2022), Pelvic and perineal pain (07/06/2020), Personal history of other diseases of the circulatory system (04/07/2020), Personal history of other diseases of the circulatory system (04/07/2020), Personal history of other diseases of the circulatory system, Personal history of other diseases of the digestive system, Personal history of other diseases of the digestive system (03/02/2020), Personal history of other diseases of the musculoskeletal system and connective tissue (01/19/2022), Personal history of other diseases of the musculoskeletal system and connective tissue (03/02/2021), Personal history of other diseases of the musculoskeletal system and connective tissue (06/16/2020), Personal history of other  diseases of the nervous system and sense organs (11/18/2019), Personal history of other diseases of the nervous system and sense organs (09/21/2022), Personal history of other diseases of the respiratory system (04/14/2021), Personal history of other endocrine, nutritional and metabolic disease (02/17/2021), Personal history of other mental and behavioral disorders (05/27/2021), Personal history of other specified conditions (09/07/2022), Personal history of other specified conditions (10/16/2019), Personal history of other specified conditions (09/28/2022), Personal history of other specified conditions (09/16/2021), Personal history of other specified conditions (02/01/2022), Personal history of other specified conditions (03/09/2022), Personal history of other specified conditions (02/12/2014), Personal history of other specified conditions (10/27/2021), Personal history of other specified conditions (10/16/2019), Personal history of other specified conditions (02/26/2021), Personal history of other specified conditions (02/22/2021), Personal history of urinary calculi, Polycystic ovary syndrome, Postural orthostatic tachycardia syndrome (POTS), Rash and other nonspecific skin eruption (03/15/2022), Repeated falls (06/23/2021), Right lower quadrant pain (10/14/2020), Seizures (Multi), Sjogren syndrome, unspecified (Multi), Sjogren's syndrome, Sleep apnea, Slow transit constipation (07/09/2020), Subarachnoid hemorrhage, traumatic (Multi) (04/19/2023), Thyroid nodule, Traumatic subarachnoid hemorrhage with loss of consciousness of unspecified duration, subsequent encounter (03/15/2022), Traumatic subarachnoid hemorrhage without loss of consciousness, subsequent encounter, Type 2 diabetes mellitus, Unspecified disorder of refraction (10/07/2022), Unspecified optic neuritis (11/06/2020), Unspecified optic neuritis (11/06/2020), Unspecified visual loss (09/25/2019), Varicella (As a child), Venous insufficiency  (chronic) (peripheral) (10/18/2021), Viral infection, unspecified (01/11/2022), Vitamin D deficiency, and Vitamin D deficiency, unspecified (09/28/2022).    Surgical History  She has a past surgical history that includes Other surgical history (08/22/2019); Other surgical history (08/22/2019); Other surgical history (08/22/2019); Other surgical history (08/22/2019); Other surgical history (08/22/2019); Other surgical history (08/22/2019); MR angio neck wo IV contrast (02/08/2021); MR angio head wo IV contrast (02/08/2021); [place] tooth extraction (2004); Appendectomy (2017); Hysterectomy (2017); Hernia repair (Right, 03/01/2024); Cardiac catheterization; Cholecystectomy; Fracture surgery (12/2023); Endometrial ablation; and Brain surgery (Silt placement to measure pressure).    Social History  She reports that she has never smoked. She has never used smokeless tobacco. She reports that she does not currently use alcohol. She reports current drug use. Drug: Benzodiazepines.    Family History  Family History   Problem Relation Name Age of Onset    Other (Perforated bowel) Mother Radha     Hypertension Mother Radha     Macular degeneration Mother Radha     Depression Mother Radha     Hyperlipidemia Mother Radha     Mental illness Mother Radha     Hyperlipidemia Father Mac     Hypertension Father Mac     Heart attack Father Mac     Other (S/P CABG) Father Mac     Diabetes Father Mac     Prostate cancer Father Mac     Coronary artery disease Father Mac     Heart failure Father Mac     Stroke Father Mac     Cancer Father Mac     Macular degeneration Father Mac     Retinal detachment Father Mac     Asthma Father Mac     Hearing loss Father Mac     Heart disease Father Mac     Hernia Father Mac     Stroke Sister Jazmin     Breast cancer Sister Jazmin     Lupus Sister Jazmin     Ovarian cancer Sister Jazmin     Astigmatism Sister Jazmin     Arthritis Sister Jazmin     Asthma Sister Jazmin      Depression Sister Jazmin     Mental illness Sister Jazmin     Miscarriages / Stillbirths Sister Jazmin     Vision loss Sister Jazmin     Hyperlipidemia Brother Sanchez     Hypertension Brother Sanchez     Astigmatism Brother Sanchez     Arthritis Brother Sanchez     Asthma Brother Sanchez     Diabetes Father's Sister Linda     Blindness Father's Sister Linda     Vision loss Father's Sister Linda     Thyroid cancer Father's Sister Bekah     Thyroid disease Father's Sister Jessica     Colon cancer Father's Brother ?     Cancer Father's Brother Kian     Anesthesia related problems Father's Brother Kian     Depression Father's Brother Kian     Hernia Father's Brother Kian     Mental illness Father's Brother Kian     Cancer Maternal Grandmother Brenda     Ovarian cancer Maternal Grandmother Brenda     Blindness Other Multiple     Melanoma Other      Asthma Other      Other (hay fever) Other      Allergies Other      Alcohol abuse Paternal Grandfather Elkin     Arthritis Sister Isha     Asthma Sister Isha     Cancer Sister Isha     Depression Sister Isha     Mental illness Sister Isha     Miscarriages / Stillbirths Sister Isha     Ovarian cancer Sister Isha     Glaucoma Neg Hx          Allergies  Acetazolamide, Atorvastatin, Cefdinir, Ceftriaxone, Doxepin, Duloxetine, Fd and c red no.40, Levofloxacin, Levofloxacin in d5w, Nutritional supplements, Ozempic [semaglutide], Prochlorperazine, Red dye, Rosemary, Rosemary oil, Strawberry, Sulfa (sulfonamide antibiotics), Topiramate, Tree pollen-black walnut, Tree pollen-pecan, [place], Aripiprazole, Aspartame, Aspartame (bulk), Fenofibrate, Gluten, Hydrochlorothiazide, Hydromorphone, Iron, Meclizine, Metformin, Propoxyphene, Statins-hmg-coa reductase inhibitors, Tetracyclines, Thiazides, Venlafaxine, Wheat, Adhesive tape-silicones, Barbiturates, Ciprofloxacin, Dhe, Diet foods, Farxiga [dapagliflozin], Ferrous sulfate, Keflex [cephalexin], L.acidrody-l.bulg-bzi-scheo, Nsaids (non-steroidal  "anti-inflammatory drug), Other, Sulfonylureas, Tobramycin, Vancomycin, Adhesive, Azithromycin, Betamethasone, House dust, Metoclopramide hcl, Prednisone, Propoxyphene-acetaminophen, Sulfacetamide sodium, Mzeofvrk-7-rg9 antimigraine agents, and Zolpidem    Review of Systems  As above     Physical Exam  Base Eye Exam       Visual Acuity (+2.50 readers)         Right Left    Near cc 20/40-3 ph 20/40-1+1 20/50-2 ph 20/40-2              Tonometry (Tonopen, 4:03 PM)         Right Left    Pressure 18 17              Pupils         Dark Light Shape React APD    Right 6 5 Round Sluggish None    Left 6 5 Round Sluggish Trace rAPD              Visual Fields         Left Right                                Extraocular Movement         Right Left     Full Full              Neuro/Psych       Oriented x3: Yes    Mood/Affect: Normal                  Additional Tests       Color         Right Left    [place] 4.5/11 2/11                  Slit Lamp and Fundus Exam       External Exam         Right Left    External Normal Normal              Slit Lamp Exam         Right Left    Lids/Lashes Normal Normal    Conjunctiva/Sclera White and quiet White and quiet    Cornea Clear Clear    Anterior Chamber Deep and quiet Deep and quiet    Iris Round and reactive Round and reactive    Lens Clear Clear    Anterior Vitreous Normal Normal              Fundus Exam         Right Left    Disc pallor temporally Pallor    C/D Ratio 0.15 0.15    Macula Normal Normal    Vessels Normal Normal    Periphery Normal Normal                     Last Recorded Vitals  Blood pressure 112/77, pulse 89, temperature 35.8 °C (96.4 °F), temperature source Temporal, resp. rate 18, height 1.575 m (5' 2\"), weight 115 kg (253 lb), SpO2 96%.    Relevant Results         Assessment/Plan     #Idiopathic intracranial hypertension  #NAION, both eyes   #S/p  shunt  - 49 y.o. female with PMH of left non-arteritic anterior ischemic optic neuropathy, bilateral sequential vision " loss with right eye improvement 11/13/2019, idiopathic intracranial hypertension status post ventriculoperitoneal shunt , seizures, scleroderma, Sjogren, CVID on monthly IVIG, POTS, HTN, DM2, hypothyroidism, BRENT on CPAP, migraine recently s/p  shunt revision on 10/30, s/p  shunt explantation and replacement of fractured valve and revision of cranial incision 11/15/24, presenting for headache, vision changes, and possible shunt leakage.   - 1/28 Shunt studies normal and CTH without evidence of ventriculomegaly.   - Visual acuity reduced by 1 line OD and 2 lines OS. Color vision slightly reduced OU, but overall stable. No optic disc edema appreciated on exam.   - Absence of optic disc edema does not rule out elevated ICP. If clinical suspicion remains high, further workup including but not limited to imaging, lumbar puncture with opening pressure potentially indicated.  - Defer remainder of management to primary team.  - Patient has upcoming appt with Dr. Mederos (neuro-ophthalmology) on 2/6/25.    Discussed with Dr. Rob Mederos, neuro-ophthalmology attending.      Herminia Marie MD  Ophthalmology, PGY3    Ophthalmology Adult Pager - [phone]  Ophthalmology Pediatrics Pager (M-F 8:00am-5:00pm) - [phone]    For adult follow-up appointments, call: [phone]  For pediatric follow-up appointments, call: [phone]

## 2025-01-28 NOTE — ED TRIAGE NOTES
TRIAGE NOTE   I saw the patient as the Clinician in Triage and performed a brief history and physical exam, established acuity, and ordered appropriate tests to develop basic plan of care. Patient will be seen by an [facility], resident and/or physician who will independently evaluate the patient. Please see subsequent provider notes for further details and disposition.     Brief HPI: In brief, Jonathan Ayala is a 49 y.o. female that presents for headache as well as leaking from her  shunt site.  She also endorses a cough and feeling unwell for the past 2 days.  She states that she noticed the leaking following a coughing fit..     Focused Physical exam:   Ambulatory with steady gait.  Full strength and sensation to bilateral upper and lower extremities.  No facial droop.  Small wound present to the right parietal region of patient's scalp with no active drainage at this time.  No surrounding erythema or excess warmth to the scalp.  Lungs clear to auscultation bilaterally.      Plan/MDM:   CT head as well as shunt series.  Viral swab obtained.  Basic labs ordered.      Please see subsequent provider note for further details and disposition

## 2025-01-28 NOTE — CONSULTS
Date of Service:  1/28/2025 Attending Provider:  Saman Webb DO     Reason for Consultation:  Jonathan Ayala is being seen today for a consult requested by Saman Webb DO for cough, concern for leakage from shunt incision.    Subjective   History of Present Illness:  Jonathan Ayala is a 49 year old with h/o IIH (dx 2/2022 w/ OP 25) s/p R frontal bolt c/b tract hemorrhage and focal epilepsy, right hemiplegic migraines, CVID on monthly IVIG infusions, Sjogren's syndrome/scleroderma, POTS, T2DM, HTN, hypothyroidism, BRENT on CPAP, anxiety/depression, 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 10/30 s/p R VPS exploration w abdominal catheter repositioning w improvement in distal runnoff, 11/6 p/w min clear incisional drainage, min decr in vision, incision oversewn with no further leakage noted, 11/15 p/w fluid leaking from incision site, HA 11/15 s/p RF shunt wound revision and fractured valve replacement, 12/9 p/w wound dehiscence/incisional leakage, 12/10 s/p R cranial wound exploration, washout, revision, 1/8 p/w double vision, nausea and vomiting, and 2 seizure events, s/p LP (OP 27), certas dialed to 4, routine EEG neg, vimpat increased, 1/28 presenting with concern for wound leakage, vision changes and mild headache. Patient notes that she has experienced a headache for 4 days. She reports that her vision has had intermittent worsening. Last night, she notes that some fluid leakage from the incision occurred, which was described as yellow with bubbles, which was followed by an improvement in vision and headache symptoms. Patient is concerned about possible infection, and wanted to come in ensure that she was not developing anything that would require neurosurgical intervention. She denied any fevers, chills, nausea, vomiting, pain along catheter.      Review of Systems negative other than listed in HPI.    Objective   Vitals:  Vitals:    01/28/25 0856    BP: 147/88   Pulse: 100   Resp: 16   Temp: 36.6 °C (97.9 °F)   SpO2: 97%         Exam:  Constitutional: No acute distress  Resp: breathing comfortably  Neuro: Awake, Ox3  face symmetric  RUE 5/5  LUE 5/5  RLE 5/5  LLE 5/5  sensation intact to light touch  Psych: appropriate  Skin: no obvious lesions, right frontal cranial shunt incision is intact, minimally erythematous, scabbed over the anterior portion    Medical History  Past Medical History:   Diagnosis Date    Abnormal findings on diagnostic imaging of other abdominal regions, including retroperitoneum 10/14/2020    Abnormal CT of the abdomen    Acquired deformity of nose 03/24/2022    Nasal deformity    Acute upper respiratory infection, unspecified 10/16/2019    Acute URI    Adrenal disease (Multi)     Allergic     Allergy status to unspecified drugs, medicaments and biological substances 05/22/2020    History of drug allergy    Allergy status to unspecified drugs, medicaments and biological substances 11/13/2020    History of adverse drug reaction    Anemia     Anxiety 2005    Asthma     Benign intracranial hypertension 01/27/2022    Pseudotumor cerebri    Bipolar disorder, unspecified (Multi)     Bipolar disease, chronic    Breast calcification, right 08/21/2018    Cellulitis of abdominal wall 09/28/2022    Cellulitis of right abdominal wall    Cervicalgia 07/01/2020    Cervicalgia of vxxtnjdi-whrylar-vtjgi region    Chronic maxillary sinusitis 01/04/2022    Chronic maxillary sinusitis    Chronic sialoadenitis 03/16/2020    Chronic sialoadenitis    COVID-19 01/06/2022    COVID-19 with multiple comorbidities    Decreased white blood cell count, unspecified 11/04/2019    Leukopenia    Disease of thyroid gland     Disturbances of salivary secretion 03/16/2020    Xerostomia    Dry eye syndrome of bilateral lacrimal glands 10/07/2022    Dry eyes, bilateral    Encounter for preprocedural cardiovascular examination 02/01/2022    Preoperative cardiovascular  examination    Food additives allergy status 06/11/2020    Allergy to food dye    Fracture of nasal bones, initial encounter for closed fracture 03/03/2022    Closed fracture of nasal bone, initial encounter    GERD (gastroesophageal reflux disease) 15 years old    Granuloma of right orbit 10/07/2021    Inflammatory pseudotumor of right orbit    History of endometrial ablation 11/09/2017    Hyperlipidemia     Hypertension     Hyperthyroidism     Hypoglycemia     Hypothyroidism     Immunocompromised     Localized swelling, mass and lump, head 03/24/2022    Swollen nose    Major depressive disorder, recurrent, in full remission ([facility]) 10/07/2021    Depression, major, recurrent, in complete remission    Mammary duct ectasia of left breast 08/24/2022    Periductal mastitis of left breast    Meningitis ([facility]) 2008    Migraine     Nipple discharge 08/24/2022    Bloody discharge from left nipple    Ocular pain, right eye 10/07/2022    Pain in right eye    Optic atrophy     Other abnormal and inconclusive findings on diagnostic imaging of breast 07/06/2020    Other abnormal and inconclusive findings on diagnostic imaging of breast    Other anomalies of pupillary function 05/31/2019    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    Other chest pain 05/18/2020    Chest discomfort    Other conditions influencing health status 08/01/2022    History of cough    Other conditions influencing health status 08/03/2021    Chronic migraine    Other specified disorders of eustachian tube, left ear 11/18/2019    ETD (Eustachian tube dysfunction), left    Other specified disorders of nose and nasal sinuses 03/24/2022    Nasal dryness    Other specified disorders of nose and nasal sinuses 03/24/2022    Nasal crusting    Pelvic and perineal pain 07/06/2020    Pelvic pain    Personal history of other diseases of the circulatory system 04/07/2020    History of sinus tachycardia    Personal history of other diseases of the  circulatory system 04/07/2020    History of abnormal electrocardiography    Personal history of other diseases of the circulatory system     History of Raynaud's syndrome    Personal history of other diseases of the digestive system     History of irritable bowel syndrome    Personal history of other diseases of the digestive system 03/02/2020    History of oral pain    Personal history of other diseases of the musculoskeletal system and connective tissue 01/19/2022    History of neck pain    Personal history of other diseases of the musculoskeletal system and connective tissue 03/02/2021    History of scleroderma    Personal history of other diseases of the musculoskeletal system and connective tissue 06/16/2020    History of muscle weakness    Personal history of other diseases of the nervous system and sense organs 11/18/2019    History of hearing loss    Personal history of other diseases of the nervous system and sense organs 09/21/2022    History of partial seizures    Personal history of other diseases of the respiratory system 04/14/2021    History of asthma    Personal history of other endocrine, nutritional and metabolic disease 02/17/2021    History of diabetes mellitus    Personal history of other mental and behavioral disorders 05/27/2021    History of anxiety    Personal history of other specified conditions 09/07/2022    History of nipple discharge    Personal history of other specified conditions 10/16/2019    History of headache    Personal history of other specified conditions 09/28/2022    History of lump of left breast    Personal history of other specified conditions 09/16/2021    History of persistent cough    Personal history of other specified conditions 02/01/2022    History of palpitations    Personal history of other specified conditions 03/09/2022    History of headache    Personal history of other specified conditions 02/12/2014    History of chest pain    Personal history of other  specified conditions 10/27/2021    History of nausea and vomiting    Personal history of other specified conditions 10/16/2019    History of fatigue    Personal history of other specified conditions 02/26/2021    History of orthopnea    Personal history of other specified conditions 02/22/2021    History of shortness of breath    Personal history of urinary calculi     H/O renal calculi    Polycystic ovary syndrome     Postural orthostatic tachycardia syndrome (POTS)     POTS (postural orthostatic tachycardia syndrome)    Rash and other nonspecific skin eruption 03/15/2022    Rash    Repeated falls 06/23/2021    Recurrent falls    Right lower quadrant pain 10/14/2020    Abdominal pain, RLQ (right lower quadrant)    Seizures (Multi)     Sjogren syndrome, unspecified (Multi)     History of Sjogren's disease    Sjogren's syndrome     Sleep apnea     Slow transit constipation 07/09/2020    Slow transit constipation    Subarachnoid hemorrhage, traumatic (Multi) 04/19/2023    Thyroid nodule     Traumatic subarachnoid hemorrhage with loss of consciousness of unspecified duration, subsequent encounter 03/15/2022    Subarachnoid hemorrhage following injury, with loss of consciousness, subsequent encounter    Traumatic subarachnoid hemorrhage without loss of consciousness, subsequent encounter     Subarachnoid hemorrhage following injury, no loss of consciousness, subsequent encounter    Type 2 diabetes mellitus     Unspecified disorder of refraction 10/07/2022    Refractive error    Unspecified optic neuritis 11/06/2020    Right optic neuritis    Unspecified optic neuritis 11/06/2020    Optic neuritis, right    Unspecified visual loss 09/25/2019    Vision loss    Varicella As a child    Venous insufficiency (chronic) (peripheral) 10/18/2021    Chronic venous insufficiency of lower extremity    Viral infection, unspecified 01/11/2022    Nonspecific syndrome suggestive of viral illness    Vitamin D deficiency     Vitamin D  deficiency, unspecified 09/28/2022    Vitamin D deficiency       Surgical History  Past Surgical History:   Procedure Laterality Date    APPENDECTOMY  2017    BRAIN SURGERY  [place] placement to measure pressure    CARDIAC CATHETERIZATION      CHOLECYSTECTOMY      ENDOMETRIAL ABLATION      FRACTURE SURGERY  12/2023    HERNIA REPAIR Right 03/01/2024    with mesh    HYSTERECTOMY  2017    MR HEAD ANGIO WO IV CONTRAST  02/08/2021    MR HEAD ANGIO WO IV CONTRAST 2/8/2021 [facility]ICAL [facility]GAC[facility]    MR NECK ANGIO WO IV CONTRAST  02/08/2021    MR NECK ANGIO WO IV CONTRAST 2/8/2021 [facility] CLINICAL LEGACY    OTHER SURGICAL HISTORY  08/22/2019    Carpal tunnel surgery    OTHER SURGICAL HISTORY  08/22/2019    Hysterectomy    OTHER SURGICAL HISTORY  08/22/2019    Venous access port placement    OTHER SURGICAL HISTORY  08/22/2019    Cholecystectomy    OTHER SURGICAL HISTORY  08/22/2019    Appendectomy    OTHER SURGICAL HISTORY  08/22/2019    Pyloroplasty    WISDOM TOOTH EXTRACTION  2004        Medications  Current Outpatient Medications   Medication Instructions    acetaminophen (TYLENOL) 325-650 mg, Every 6 hours PRN    Advair -21 mcg/actuation inhaler INHALE TWO PUFFS BY MOUTH AS INSTRUCTED TWO TIMES A DAY.    amitriptyline (ELAVIL) 100 mg, oral, Nightly    amoxicillin-pot clavulanate (Augmentin) 875-125 mg tablet 1 tablet, oral, Every 12 hours    azelastine (Astelin) 137 mcg (0.1 %) nasal spray 1 spray, 2 times daily    blood-glucose sensor (DEXCOM G7 SENSOR MISC) Use to check blood sugar continuously throughout the day as directed. Change sensor every 10 days.    calcium-vitamin D3-vitamin K (Viactiv) 650 mg-12.5 mcg-40 mcg chewable tablet 2 tablets, Daily    carboxymethylcellulose (Refresh Celluvisc) 1 % ophthalmic solution dropperette 2 drops, 3 times daily PRN    cholecalciferol (Vitamin D-3) 5,000 Units tablet 1 tablet, Daily    clonazePAM (KlonoPIN) 0.5 mg tablet 1 tablet, 2 times daily    Dexcom G7  misc Use as  instructed to check blood sugars continuously throughout the day    diclofenac (VOLTAREN) 50 mg, oral, 3 times daily PRN    dicyclomine (BENTYL) 20 mg, oral, 4 times daily PRN    divalproex (Depakote ER) 500 mg 24 hr tablet 1 tablet, 2 times daily    EPINEPHrine 0.3 mg/0.3 mL injection syringe INJECT INTRAMUSCULARLY ONCE AS NEEDED FOR ANAPHYLAXIS    ezetimibe (ZETIA) 10 mg, oral, Daily    fluticasone (Flonase) 50 mcg/actuation nasal spray USE 1 SPRAY INTO EACH NOSTRIL ONCE DAILY.    folic acid (FOLVITE) 1 mg, oral, Daily    furosemide (LASIX) 20 mg, oral, 2 times daily    gloves, latex with aloe vera misc Large size gloves    glucagon (Baqsimi) 3 mg/actuation spray,non-aerosol USE ONE SPRAY IN THE NOSE AS NEEDED FOR LOW BLOOD SUGAR  MAY REPEAT AFTER 15 MINUTES USING A NEW DEVICE IF NO RESPONSE    glucagon (GLUCAGEN) 1 mg, subcutaneous, Once as needed    hydrocortisone (CORTEF) 10 mg, oral, 2 times daily, Double the dose if sick for three days    immune globulin, human, (Gammagard) infusion Infuse 600 mL (60 g) into a venous catheter every 14 (fourteen) days.    insulin glargine (Toujeo Max Solostar- 2 unit dial) 300 unit/mL (3 mL) injection Inject 54 units under the skin once daily    insulin lispro (HumaLOG KwikPen Insulin) 100 unit/mL injection Use as directed to give up to 100 units a day    ipratropium-albuteroL (Duo-Neb) 0.5-2.5 mg/3 mL nebulizer solution 3 mL, 4 times daily PRN    lacosamide (Vimpat) 50 mg tablet Take 1 tablet (50 mg) by mouth every 12 hours.    lacosamide (VIMPAT) 300 mg, oral, 2 times daily    levETIRAcetam (KEPPRA) 1,500 mg, oral, 2 times daily    levocetirizine (Xyzal) 5 mg tablet 1 tablet, oral, Daily (0630)    levothyroxine (SYNTHROID, LEVOXYL) 75 mcg, oral, Daily before breakfast    miconazole (Micotin) 2 % cream 1 Application, Daily PRN    miconazole (Micotin) 2 % powder Apply to groin , under the breast and skin folds daily    miscellaneous medical supply misc Bath Wipes  fragrance  "free    moisturizing mouth (Biotene Oral Dry Mouth) solution 1 spray, 4 times daily PRN    montelukast (SINGULAIR) 10 mg, oral, Nightly    Motegrity 2 mg tablet 1 tablet, Daily    nasal spray Nayzilam 5 mg/spray (0.1 mL) spray,non-aerosol nasal, Once    ondansetron ODT (ZOFRAN-ODT) 4 mg, oral, Every 8 hours PRN    OneTouch Delica Plus Lancet 33 gauge misc USE 1 LANCET TO CHECK GLUCOSE ONCE DAILY AS DIRECTED    OneTouch Verio test strips strip USE 1 STRIP TO CHECK GLUCOSE ONCE DAILY AS DIRECTED    pantoprazole (PROTONIX) 40 mg, 2 times daily before meals    pen needle, diabetic (BD Lela 2nd Gen Pen Needle) 32 gauge x 5/32\" needle Use as directed with insulin pen up to 5 times daily    polyethylene glycol (GLYCOLAX, MIRALAX) 17 g, oral, 3 times daily PRN    propranolol LA (INDERAL LA) 60 mg, oral, Daily, Do not crush, chew, or split.    Reyvow 100 mg, oral, As needed, Do not drive in 8 hours of taking this medicine. Maximum one dose per 24 hours.    rOPINIRole (Requip) 0.5 mg tablet Take 1 tablet by mouth (0.5mg) in the morning and 2 tablets (1mg) by mouth in the evening    senna 8.6 mg tablet 1-2 tablets, Nightly PRN    tiZANidine (ZANAFLEX) 2 mg, Every 8 hours PRN    ubrogepant (UBRELVY) 100 mg, oral, As needed, May repeat in 2 hours for max of 200mg per 24 hours.    ZINC ORAL 50 mg, Daily        Diagnostic Results:    Lab Results   Component Value Date    WBC 6.3 01/28/2025    HGB 11.7 (L) 01/28/2025    HCT 37.5 01/28/2025    MCV 85 01/28/2025     01/28/2025     Lab Results   Component Value Date    CREATININE 0.81 01/26/2025    BUN 12 01/26/2025     01/26/2025    K 3.7 01/26/2025     01/26/2025    CO2 27 01/26/2025     Lab Results   Component Value Date    INR 1.0 01/22/2025    INR 1.0 01/08/2025    INR 1.0 12/09/2024    PROTIME 11.1 01/22/2025    PROTIME 10.9 01/08/2025    PROTIME 10.9 12/09/2024       === 01/28/25 ===    CT HEAD WO IV CONTRAST    - Impression -  Stable CT head including stable " right frontal approach  ventriculostomy catheter and stable appearance of the ventricles. No  hydrocephalus.    This study was interpreted at [facility].    MACRO:  None    Signed by: Zafar Haley 1/28/2025 11:52 AM  Dictation workstation:   [id number]  === 10/23/24 ===    MR ORBIT W AND WO IV CONTRAST    - Impression -  MR brain:    Limited evaluation of the right frontoparietal region due to  susceptibility artifact.  1. Interval placement of right frontal approach ventriculostomy  catheter with tip terminating within the foramen of Monro.  Unremarkable size and appearance of the ventricular system. Gliosis  within the right superior frontal lobe and anterior frontal white  matter along the course of the ventriculostomy catheter.  2. No evidence of acute infarct, intracranial mass effect, or midline  shift.      MR orbit:    Redemonstration of asymmetric atrophy of the left optic nerve. No  evidence of abnormal enhancement or intraorbital mass. The right  optic nerve is normal in size.      I personally reviewed the images/study and I agree with the findings  as stated by Dr. Brody Daley M.D. This study was interpreted at  [place], [place].    MACRO:  None    Signed by: John Mcnamara 10/23/2024 9:37 PM  Dictation workstation:   [id number]      Assessment/Plan   Assessment:  Jonathan Ayala is a 49 year old with h/o IIH (dx 2/2022 w/ OP 25) s/p R frontal bolt c/b tract hemorrhage and focal epilepsy, right hemiplegic migraines, CVID on monthly IVIG infusions, Sjogren's syndrome/scleroderma, POTS, T2DM, HTN, hypothyroidism, BRENT on CPAP, anxiety/depression, 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 10/30 s/p R VPS exploration w abdominal catheter repositioning w improvement in distal runnoff, 11/6 p/w min clear incisional drainage, min decr in vision, incision oversewn with no further  leakage noted, 11/15 p/w fluid leaking from incision site, HA 11/15 s/p RF shunt wound revision and fractured valve replacement, 12/9 p/w wound dehiscence/incisional leakage, 12/10 s/p R cranial wound exploration, washout, revision, 1/8 p/w double vision, nausea and vomiting, and 2 seizure events, s/p LP (OP 27), certas dialed to 4, routine EEG neg, vimpat increased, 1/28 p/w concern for wound leakage, vision changes and mild headache. SS intact, CTH stable    Patient presenting with some concern for leakage at the shunt incision, with some intermittent vision changes and headache. On exam, there is no obvious leakage from the incision, and the patient has a mostly resolved headache. CTH was stable, and shunt series xrays show an intact shunt series. Recommend ophtho evaluation to determine if there is any papilledema or vision changes, as her shunt failures in the past have presented this way.    Plan:  Appreciate ophtho recs for determining presence of papilledema or vision changes  Further recs pending ophtho evaluation    Rob Durham MD

## 2025-01-28 NOTE — ED TRIAGE NOTES
PT had shunt placed in Sept, has had several surgeries since, last mid December. PT states area opened up and is draining. Pt also having head aches. Pt blind,  states drainage is clearish yellow

## 2025-01-28 NOTE — H&P
History Of Present Illness  Jonathan Ayala is a 49 year old with h/o IIH (dx 2/2022 w/ OP 25) s/p R frontal bolt c/b tract hemorrhage and focal epilepsy, right hemiplegic migraines, CVID on monthly IVIG infusions, Sjogren's syndrome/scleroderma, POTS, T2DM, HTN, hypothyroidism, BRENT on CPAP, anxiety/depression, 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 10/30 s/p R VPS exploration w abdominal catheter repositioning w improvement in distal runnoff, 11/6 p/w min clear incisional drainage, min decr in vision, incision oversewn with no further leakage noted, 11/15 p/w fluid leaking from incision site, HA 11/15 s/p RF shunt wound revision and fractured valve replacement, 12/9 p/w wound dehiscence/incisional leakage, 12/10 s/p R cranial wound exploration, washout, revision, 1/8 p/w double vision, nausea and vomiting, and 2 seizure events, s/p LP (OP 27), certas dialed to 4, routine EEG neg, vimpat increased, 1/28 presenting with concern for wound leakage, vision changes and mild headache. Patient notes that she has experienced a headache for 4 days. She reports that her vision has had intermittent worsening. Last night, she notes that some fluid leakage from the incision occurred, which was described as yellow with bubbles, which was followed by an improvement in vision and headache symptoms. Patient is concerned about possible infection, and wanted to come in ensure that she was not developing anything that would require neurosurgical intervention. She denied any fevers, chills, nausea, vomiting, pain along catheter.     Past Medical History  She has a past medical history of Abnormal findings on diagnostic imaging of other abdominal regions, including retroperitoneum (10/14/2020), Acquired deformity of nose (03/24/2022), Acute upper respiratory infection, unspecified (10/16/2019), Adrenal disease (Multi), Allergic, Allergy status to unspecified drugs, medicaments and  biological substances (05/22/2020), Allergy status to unspecified drugs, medicaments and biological substances (11/13/2020), Anemia, Anxiety (2005), Asthma, Benign intracranial hypertension (01/27/2022), Bipolar disorder, unspecified (Multi), Breast calcification, right (08/21/2018), Cellulitis of abdominal wall (09/28/2022), Cervicalgia (07/01/2020), Chronic maxillary sinusitis (01/04/2022), Chronic sialoadenitis (03/16/2020), COVID-19 (01/06/2022), Decreased white blood cell count, unspecified (11/04/2019), Disease of thyroid gland, Disturbances of salivary secretion (03/16/2020), Dry eye syndrome of bilateral lacrimal glands (10/07/2022), Encounter for preprocedural cardiovascular examination (02/01/2022), Food additives allergy status (06/11/2020), Fracture of nasal bones, initial encounter for closed fracture (03/03/2022), GERD (gastroesophageal reflux disease) (15 years old), Granuloma of right orbit (10/07/2021), History of endometrial ablation (11/09/2017), Hyperlipidemia, Hypertension, Hyperthyroidism, Hypoglycemia, Hypothyroidism, Immunocompromised, Localized swelling, mass and lump, head (03/24/2022), Major depressive disorder, recurrent, in full remission (CMS-HCC) (10/07/2021), 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), Meningitis ([facility]) (2008), Migraine, Nipple discharge (08/24/2022), Ocular pain, right eye (10/07/2022), Optic atrophy, Other abnormal and inconclusive findings on diagnostic imaging of breast (07/06/2020), Other anomalies of pupillary function (05/31/2019), Other chest pain (05/18/2020), Other conditions influencing health status (08/01/2022), Other conditions influencing health status (08/03/2021), Other specified disorders of eustachian tube, left ear (11/18/2019), Other specified disorders of nose and nasal sinuses (03/24/2022), Other specified disorders of nose and nasal sinuses (03/24/2022), Pelvic and perineal pain (07/06/2020), Personal history of other diseases of the  circulatory system (04/07/2020), Personal history of other diseases of the circulatory system (04/07/2020), Personal history of other diseases of the circulatory system, Personal history of other diseases of the digestive system, Personal history of other diseases of the digestive system (03/02/2020), Personal history of other diseases of the musculoskeletal system and connective tissue (01/19/2022), Personal history of other diseases of the musculoskeletal system and connective tissue (03/02/2021), Personal history of other diseases of the musculoskeletal system and connective tissue (06/16/2020), Personal history of other diseases of the nervous system and sense organs (11/18/2019), Personal history of other diseases of the nervous system and sense organs (09/21/2022), Personal history of other diseases of the respiratory system (04/14/2021), Personal history of other endocrine, nutritional and metabolic disease (02/17/2021), Personal history of other mental and behavioral disorders (05/27/2021), Personal history of other specified conditions (09/07/2022), Personal history of other specified conditions (10/16/2019), Personal history of other specified conditions (09/28/2022), Personal history of other specified conditions (09/16/2021), Personal history of other specified conditions (02/01/2022), Personal history of other specified conditions (03/09/2022), Personal history of other specified conditions (02/12/2014), Personal history of other specified conditions (10/27/2021), Personal history of other specified conditions (10/16/2019), Personal history of other specified conditions (02/26/2021), Personal history of other specified conditions (02/22/2021), Personal history of urinary calculi, Polycystic ovary syndrome, Postural orthostatic tachycardia syndrome (POTS), Rash and other nonspecific skin eruption (03/15/2022), Repeated falls (06/23/2021), Right lower quadrant pain (10/14/2020), Seizures (Multi), Sjogren  syndrome, unspecified (Multi), Sjogren's syndrome, Sleep apnea, Slow transit constipation (07/09/2020), Subarachnoid hemorrhage, traumatic (Multi) (04/19/2023), Thyroid nodule, Traumatic subarachnoid hemorrhage with loss of consciousness of unspecified duration, subsequent encounter (03/15/2022), Traumatic subarachnoid hemorrhage without loss of consciousness, subsequent encounter, Type 2 diabetes mellitus, Unspecified disorder of refraction (10/07/2022), Unspecified optic neuritis (11/06/2020), Unspecified optic neuritis (11/06/2020), Unspecified visual loss (09/25/2019), Varicella (As a child), Venous insufficiency (chronic) (peripheral) (10/18/2021), Viral infection, unspecified (01/11/2022), Vitamin D deficiency, and Vitamin D deficiency, unspecified (09/28/2022).    Surgical History  She has a past surgical history that includes Other surgical history (08/22/2019); Other surgical history (08/22/2019); Other surgical history (08/22/2019); Other surgical history (08/22/2019); Other surgical history (08/22/2019); Other surgical history (08/22/2019); MR angio neck wo IV contrast (02/08/2021); MR angio head wo IV contrast (02/08/2021); [place] tooth extraction (2004); Appendectomy (2017); Hysterectomy (2017); Hernia repair (Right, 03/01/2024); Cardiac catheterization; Cholecystectomy; Fracture surgery (12/2023); Endometrial ablation; and Brain surgery (Purling placement to measure pressure).     Social History  She reports that she has never smoked. She has never used smokeless tobacco. She reports that she does not currently use alcohol. She reports current drug use. Drug: Benzodiazepines.     Allergies  Acetazolamide, Atorvastatin, Cefdinir, Ceftriaxone, Doxepin, Duloxetine, Fd and c red no.40, Levofloxacin, Levofloxacin in d5w, Nutritional supplements, Ozempic [semaglutide], Prochlorperazine, Red dye, Rosemary, Rosemary oil, Strawberry, Sulfa (sulfonamide antibiotics), Topiramate, Tree pollen-black walnut, Tree  "pollen-pecan, [place], Aripiprazole, Aspartame, Aspartame (bulk), Fenofibrate, Gluten, Hydrochlorothiazide, Hydromorphone, Iron, Meclizine, Metformin, Propoxyphene, Statins-hmg-coa reductase inhibitors, Tetracyclines, Thiazides, Venlafaxine, Wheat, Adhesive tape-silicones, Barbiturates, Ciprofloxacin, Dhe, Diet foods, Farxiga [dapagliflozin], Ferrous sulfate, Keflex [cephalexin], L.acidoph-l.bulg-b.bif-s.therm, Nsaids (non-steroidal anti-inflammatory drug), Other, Sulfonylureas, Tobramycin, Vancomycin, Adhesive, Azithromycin, Betamethasone, House dust, Metoclopramide hcl, Prednisone, Propoxyphene-acetaminophen, Sulfacetamide sodium, Xlovfsld-9-cx7 antimigraine agents, and Zolpidem    Medications  (Not in a hospital admission)      Review of Systems     Physical Exam  Constitutional: No acute distress  Resp: breathing comfortably  Neuro: Awake, Ox3  face symmetric  RUE 5/5  LUE 5/5  RLE 5/5  LLE 5/5  sensation intact to light touch  Psych: appropriate  Skin: no obvious lesions, right frontal cranial shunt incision is intact, minimally erythematous, scabbed over the anterior portion    Last Recorded Vitals  Blood pressure 112/77, pulse 89, temperature 35.8 °C (96.4 °F), temperature source Temporal, resp. rate 18, height 1.575 m (5' 2\"), weight 115 kg (253 lb), SpO2 96%.      Assessment/Plan   Assessment & Plan  IIH (idiopathic intracranial hypertension)      Jonathan Ayala is a 49 year old with h/o IIH (dx 2/2022 w/ OP 25) s/p R frontal bolt c/b tract hemorrhage and focal epilepsy, right hemiplegic migraines, CVID on monthly IVIG infusions, Sjogren's syndrome/scleroderma, POTS, T2DM, HTN, hypothyroidism, BRENT on CPAP, anxiety/depression, 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 10/30 s/p R VPS exploration w abdominal catheter repositioning w improvement in distal runnoff, 11/6 p/w min clear incisional drainage, min decr in vision, incision oversewn with no further leakage " noted, 11/15 p/w fluid leaking from incision site, HA 11/15 s/p RF shunt wound revision and fractured valve replacement, 12/9 p/w wound dehiscence/incisional leakage, 12/10 s/p R cranial wound exploration, washout, revision, 1/8 p/w double vision, nausea and vomiting, and 2 seizure events, s/p LP (OP 27), certas dialed to 4, routine EEG neg, vimpat increased, 1/28 p/w concern for wound leakage, vision changes and mild headache. SS intact, CTH stable     Patient presenting with some concern for leakage at the shunt incision, with some intermittent vision changes and headache. On exam, there is no obvious leakage from the incision, and the patient has a mostly resolved headache. CTH was stable, and shunt series xrays show an intact shunt series. Ophtho found that vision in both eyes has worsened from prior exam, and color differentiation is also worse, raising concern for possible elevated ICPs, necessitating IR-guided LP.    Plan  Admit for observation under neurosurgery  Will plan on IR-guided LP  Obtain CBC, RFP, coag, T&S, UA, EKG, CXR      Rob Durham MD  Note authored by resident on neurosurgery team, with all questions or to contact team please page at [phone]

## 2025-01-29 ENCOUNTER — ANESTHESIA EVENT (OUTPATIENT)
Dept: OPERATING ROOM | Facility: HOSPITAL | Age: 50
End: 2025-01-29
Payer: MEDICAID

## 2025-01-29 ENCOUNTER — APPOINTMENT (OUTPATIENT)
Dept: NEUROSURGERY | Facility: HOSPITAL | Age: 50
End: 2025-01-29
Payer: MEDICAID

## 2025-01-29 ENCOUNTER — APPOINTMENT (OUTPATIENT)
Dept: CARDIOLOGY | Facility: HOSPITAL | Age: 50
End: 2025-01-29
Payer: MEDICAID

## 2025-01-29 ENCOUNTER — APPOINTMENT (OUTPATIENT)
Dept: RADIOLOGY | Facility: HOSPITAL | Age: 50
End: 2025-01-29
Payer: MEDICAID

## 2025-01-29 PROBLEM — Z98.890: Status: ACTIVE | Noted: 2025-01-28

## 2025-01-29 PROBLEM — R51.9 NONINTRACTABLE HEADACHE, UNSPECIFIED CHRONICITY PATTERN, UNSPECIFIED HEADACHE TYPE: Status: ACTIVE | Noted: 2025-01-29

## 2025-01-29 LAB
ABO GROUP (TYPE) IN BLOOD: NORMAL
ANTIBODY SCREEN: NORMAL
APPEARANCE CSF: CLEAR
APPEARANCE UR: CLEAR
B-HCG SERPL-ACNC: <3 MIU/ML
BASOPHILS NFR CSF MANUAL: 0 %
BILIRUB UR STRIP.AUTO-MCNC: NEGATIVE MG/DL
BLASTS CSF MANUAL: 0 %
COLOR CSF: COLORLESS
COLOR UR: NORMAL
EOSINOPHIL NFR CSF MANUAL: 0 %
GLUCOSE BLD MANUAL STRIP-MCNC: 215 MG/DL (ref 74–99)
GLUCOSE BLD MANUAL STRIP-MCNC: 259 MG/DL (ref 74–99)
GLUCOSE UR STRIP.AUTO-MCNC: NORMAL MG/DL
HCG UR QL IA.RAPID: NEGATIVE
IMM GRANULOCYTES NFR CSF: 0 %
KETONES UR STRIP.AUTO-MCNC: NEGATIVE MG/DL
LEUKOCYTE ESTERASE UR QL STRIP.AUTO: NEGATIVE
LYMPHOCYTES NFR CSF MANUAL: 54 % (ref 28–96)
MONOS+MACROS NFR CSF MANUAL: 46 % (ref 16–56)
NEUTS SEG NFR CSF MANUAL: 0 % (ref 0–5)
NITRITE UR QL STRIP.AUTO: NEGATIVE
OTHER CELLS NFR CSF MANUAL: 0 %
PH UR STRIP.AUTO: 5 [PH]
PLASMA CELLS NFR CSF MICRO: 0 %
PROT UR STRIP.AUTO-MCNC: NEGATIVE MG/DL
RBC # CSF AUTO: 15 /UL (ref 0–5)
RBC # UR STRIP.AUTO: NEGATIVE /UL
RH FACTOR (ANTIGEN D): NORMAL
SP GR UR STRIP.AUTO: 1.01
TOTAL CELLS COUNTED CSF: 50
TUBE # CSF: ABNORMAL
UROBILINOGEN UR STRIP.AUTO-MCNC: NORMAL MG/DL
WBC # CSF AUTO: 4 /UL (ref 1–5)

## 2025-01-29 PROCEDURE — 71045 X-RAY EXAM CHEST 1 VIEW: CPT

## 2025-01-29 PROCEDURE — 93005 ELECTROCARDIOGRAM TRACING: CPT

## 2025-01-29 PROCEDURE — 70450 CT HEAD/BRAIN W/O DYE: CPT

## 2025-01-29 PROCEDURE — 2500000002 HC RX 250 W HCPCS SELF ADMINISTERED DRUGS (ALT 637 FOR MEDICARE OP, ALT 636 FOR OP/ED): Mod: SE

## 2025-01-29 PROCEDURE — 36415 COLL VENOUS BLD VENIPUNCTURE: CPT

## 2025-01-29 PROCEDURE — 2500000005 HC RX 250 GENERAL PHARMACY W/O HCPCS: Mod: SE

## 2025-01-29 PROCEDURE — 81003 URINALYSIS AUTO W/O SCOPE: CPT

## 2025-01-29 PROCEDURE — 2500000001 HC RX 250 WO HCPCS SELF ADMINISTERED DRUGS (ALT 637 FOR MEDICARE OP): Mod: SE

## 2025-01-29 PROCEDURE — 93010 ELECTROCARDIOGRAM REPORT: CPT | Performed by: STUDENT IN AN ORGANIZED HEALTH CARE EDUCATION/TRAINING PROGRAM

## 2025-01-29 PROCEDURE — 70450 CT HEAD/BRAIN W/O DYE: CPT | Performed by: RADIOLOGY

## 2025-01-29 PROCEDURE — 82947 ASSAY GLUCOSE BLOOD QUANT: CPT

## 2025-01-29 PROCEDURE — 96372 THER/PROPH/DIAG INJ SC/IM: CPT

## 2025-01-29 PROCEDURE — 62270 DX LMBR SPI PNXR: CPT | Mod: GC

## 2025-01-29 PROCEDURE — 1100000001 HC PRIVATE ROOM DAILY

## 2025-01-29 PROCEDURE — 81025 URINE PREGNANCY TEST: CPT

## 2025-01-29 PROCEDURE — 84702 CHORIONIC GONADOTROPIN TEST: CPT

## 2025-01-29 PROCEDURE — 2500000004 HC RX 250 GENERAL PHARMACY W/ HCPCS (ALT 636 FOR OP/ED): Mod: SE

## 2025-01-29 PROCEDURE — 71046 X-RAY EXAM CHEST 2 VIEWS: CPT | Performed by: RADIOLOGY

## 2025-01-29 PROCEDURE — 86850 RBC ANTIBODY SCREEN: CPT

## 2025-01-29 RX ORDER — NYSTATIN 100000 [USP'U]/ML
4 SUSPENSION ORAL 2 TIMES DAILY
Status: DISCONTINUED | OUTPATIENT
Start: 2025-01-29 | End: 2025-01-29

## 2025-01-29 RX ORDER — BISACODYL 10 MG/1
10 SUPPOSITORY RECTAL DAILY
Status: DISCONTINUED | OUTPATIENT
Start: 2025-01-29 | End: 2025-01-30

## 2025-01-29 RX ORDER — ADHESIVE BANDAGE
30 BANDAGE TOPICAL DAILY PRN
Status: DISCONTINUED | OUTPATIENT
Start: 2025-01-29 | End: 2025-01-29

## 2025-01-29 RX ORDER — LASMIDITAN 100 MG/1
100 TABLET ORAL AS NEEDED
Qty: 9 TABLET | Refills: 5 | Status: SHIPPED | OUTPATIENT
Start: 2025-01-29 | End: 2026-01-29

## 2025-01-29 RX ORDER — BACITRACIN 500 [USP'U]/G
OINTMENT TOPICAL DAILY PRN
Status: DISCONTINUED | OUTPATIENT
Start: 2025-01-29 | End: 2025-01-30

## 2025-01-29 RX ORDER — INSULIN LISPRO 100 [IU]/ML
0-5 INJECTION, SOLUTION INTRAVENOUS; SUBCUTANEOUS
Status: DISCONTINUED | OUTPATIENT
Start: 2025-01-30 | End: 2025-01-30

## 2025-01-29 RX ORDER — CLOTRIMAZOLE 10 MG/1
10 LOZENGE ORAL 3 TIMES DAILY
Status: DISCONTINUED | OUTPATIENT
Start: 2025-01-29 | End: 2025-01-31 | Stop reason: HOSPADM

## 2025-01-29 RX ADMIN — ROPINIROLE 0.5 MG: 0.5 TABLET, FILM COATED ORAL at 00:48

## 2025-01-29 RX ADMIN — PROPRANOLOL HYDROCHLORIDE 60 MG: 60 CAPSULE, EXTENDED RELEASE ORAL at 09:12

## 2025-01-29 RX ADMIN — PANTOPRAZOLE SODIUM 40 MG: 40 TABLET, DELAYED RELEASE ORAL at 17:13

## 2025-01-29 RX ADMIN — CLONAZEPAM 0.5 MG: 0.5 TABLET ORAL at 09:12

## 2025-01-29 RX ADMIN — LEVETIRACETAM 1500 MG: 750 TABLET, FILM COATED ORAL at 20:49

## 2025-01-29 RX ADMIN — BACITRACIN: 500 OINTMENT TOPICAL at 20:47

## 2025-01-29 RX ADMIN — AMOXICILLIN AND CLAVULANATE POTASSIUM 1 TABLET: 875; 125 TABLET, FILM COATED ORAL at 00:47

## 2025-01-29 RX ADMIN — FUROSEMIDE 20 MG: 20 TABLET ORAL at 09:12

## 2025-01-29 RX ADMIN — DIVALPROEX SODIUM 500 MG: 500 TABLET, FILM COATED, EXTENDED RELEASE ORAL at 20:49

## 2025-01-29 RX ADMIN — POLYETHYLENE GLYCOL 3350 17 G: 17 POWDER, FOR SOLUTION ORAL at 09:13

## 2025-01-29 RX ADMIN — AMITRIPTYLINE HYDROCHLORIDE 100 MG: 100 TABLET ORAL at 20:48

## 2025-01-29 RX ADMIN — HEPARIN SODIUM 7500 UNITS: 5000 INJECTION INTRAVENOUS; SUBCUTANEOUS at 03:57

## 2025-01-29 RX ADMIN — AMITRIPTYLINE HYDROCHLORIDE 100 MG: 100 TABLET ORAL at 00:48

## 2025-01-29 RX ADMIN — Medication 1 TABLET: at 09:12

## 2025-01-29 RX ADMIN — ROPINIROLE 0.5 MG: 0.5 TABLET, FILM COATED ORAL at 09:13

## 2025-01-29 RX ADMIN — HEPARIN SODIUM 7500 UNITS: 5000 INJECTION INTRAVENOUS; SUBCUTANEOUS at 11:16

## 2025-01-29 RX ADMIN — HEPARIN SODIUM 7500 UNITS: 5000 INJECTION INTRAVENOUS; SUBCUTANEOUS at 20:50

## 2025-01-29 RX ADMIN — BACITRACIN ZINC, NEOMYCIN, POLYMYXIN B: 400; 3.5; 5 OINTMENT TOPICAL at 20:52

## 2025-01-29 RX ADMIN — HYDROCORTISONE 10 MG: 10 TABLET ORAL at 09:12

## 2025-01-29 RX ADMIN — LACOSAMIDE 300 MG: 100 TABLET, FILM COATED ORAL at 09:12

## 2025-01-29 RX ADMIN — AZELASTINE HYDROCHLORIDE 1 SPRAY: 137 SPRAY, METERED NASAL at 00:46

## 2025-01-29 RX ADMIN — BACITRACIN ZINC, NEOMYCIN, POLYMYXIN B: 400; 3.5; 5 OINTMENT TOPICAL at 14:45

## 2025-01-29 RX ADMIN — CLONAZEPAM 0.5 MG: 0.5 TABLET ORAL at 20:48

## 2025-01-29 RX ADMIN — TIZANIDINE 2 MG: 4 TABLET ORAL at 09:11

## 2025-01-29 RX ADMIN — Medication 5000 UNITS: at 09:13

## 2025-01-29 RX ADMIN — TIZANIDINE 2 MG: 4 TABLET ORAL at 20:50

## 2025-01-29 RX ADMIN — TIZANIDINE 2 MG: 4 TABLET ORAL at 00:46

## 2025-01-29 RX ADMIN — LACOSAMIDE 300 MG: 100 TABLET, FILM COATED ORAL at 20:46

## 2025-01-29 RX ADMIN — AZELASTINE HYDROCHLORIDE 1 SPRAY: 137 SPRAY, METERED NASAL at 09:14

## 2025-01-29 RX ADMIN — DIVALPROEX SODIUM 500 MG: 500 TABLET, FILM COATED, EXTENDED RELEASE ORAL at 09:12

## 2025-01-29 RX ADMIN — FOLIC ACID 1 MG: 1 TABLET ORAL at 09:12

## 2025-01-29 RX ADMIN — CLOTRIMAZOLE 10 MG: 10 LOZENGE ORAL at 20:48

## 2025-01-29 RX ADMIN — LEVETIRACETAM 1500 MG: 750 TABLET, FILM COATED ORAL at 09:12

## 2025-01-29 RX ADMIN — ROPINIROLE 0.5 MG: 0.5 TABLET, FILM COATED ORAL at 20:48

## 2025-01-29 RX ADMIN — CLOTRIMAZOLE 10 MG: 10 LOZENGE ORAL at 14:44

## 2025-01-29 RX ADMIN — LEVOTHYROXINE SODIUM 75 MCG: 0.05 TABLET ORAL at 07:01

## 2025-01-29 RX ADMIN — AZELASTINE HYDROCHLORIDE 1 SPRAY: 137 SPRAY, METERED NASAL at 20:47

## 2025-01-29 RX ADMIN — FLUTICASONE PROPIONATE 1 SPRAY: 50 SPRAY, METERED NASAL at 09:14

## 2025-01-29 RX ADMIN — PANTOPRAZOLE SODIUM 40 MG: 40 TABLET, DELAYED RELEASE ORAL at 07:01

## 2025-01-29 RX ADMIN — FUROSEMIDE 20 MG: 20 TABLET ORAL at 20:49

## 2025-01-29 RX ADMIN — MONTELUKAST 10 MG: 10 TABLET, FILM COATED ORAL at 20:49

## 2025-01-29 RX ADMIN — AMOXICILLIN AND CLAVULANATE POTASSIUM 1 TABLET: 875; 125 TABLET, FILM COATED ORAL at 11:15

## 2025-01-29 RX ADMIN — AMOXICILLIN AND CLAVULANATE POTASSIUM 1 TABLET: 875; 125 TABLET, FILM COATED ORAL at 22:44

## 2025-01-29 RX ADMIN — EZETIMIBE 10 MG: 10 TABLET ORAL at 09:12

## 2025-01-29 RX ADMIN — DIVALPROEX SODIUM 500 MG: 500 TABLET, FILM COATED, EXTENDED RELEASE ORAL at 00:47

## 2025-01-29 SDOH — ECONOMIC STABILITY: FOOD INSECURITY
WITHIN THE PAST 12 MONTHS, YOU WORRIED THAT YOUR FOOD WOULD RUN OUT BEFORE YOU GOT THE MONEY TO BUY MORE.: SOMETIMES TRUE

## 2025-01-29 SDOH — ECONOMIC STABILITY: INCOME INSECURITY: IN THE PAST 12 MONTHS HAS THE ELECTRIC, GAS, OIL, OR WATER COMPANY THREATENED TO SHUT OFF SERVICES IN YOUR HOME?: NO

## 2025-01-29 SDOH — ECONOMIC STABILITY: HOUSING INSECURITY

## 2025-01-29 SDOH — ECONOMIC STABILITY: TRANSPORTATION INSECURITY: IN THE PAST 12 MONTHS, HAS LACK OF TRANSPORTATION KEPT YOU FROM MEDICAL APPOINTMENTS OR FROM GETTING MEDICATIONS?: NO

## 2025-01-29 SDOH — ECONOMIC STABILITY: HOUSING INSECURITY: IN THE LAST 12 MONTHS, WAS THERE A TIME WHEN YOU WERE NOT ABLE TO PAY THE MORTGAGE OR RENT ON TIME?: YES

## 2025-01-29 SDOH — ECONOMIC STABILITY: INCOME INSECURITY: IN THE LAST 12 MONTHS, WAS THERE A TIME WHEN YOU WERE NOT ABLE TO PAY THE MORTGAGE OR RENT ON TIME?: YES

## 2025-01-29 SDOH — ECONOMIC STABILITY: HOUSING INSECURITY: IN THE PAST 12 MONTHS HAS THE ELECTRIC, GAS, OIL, OR WATER COMPANY THREATENED TO SHUT OFF SERVICES IN YOUR HOME?: NO

## 2025-01-29 SDOH — SOCIAL STABILITY: SOCIAL INSECURITY: WITHIN THE LAST YEAR, HAVE YOU BEEN AFRAID OF YOUR PARTNER OR EX-PARTNER?: NO

## 2025-01-29 SDOH — SOCIAL STABILITY: SOCIAL INSECURITY: WITHIN THE LAST YEAR, HAVE YOU BEEN HUMILIATED OR EMOTIONALLY ABUSED IN OTHER WAYS BY YOUR PARTNER OR EX-PARTNER?: NO

## 2025-01-29 SDOH — ECONOMIC STABILITY: FOOD INSECURITY: WITHIN THE PAST 12 MONTHS, YOU WORRIED THAT YOUR FOOD WOULD RUN OUT BEFORE YOU GOT THE MONEY TO BUY MORE.: NEVER TRUE

## 2025-01-29 SDOH — ECONOMIC STABILITY: FOOD INSECURITY: WITHIN THE PAST 12 MONTHS, YOU WORRIED THAT YOUR FOOD WOULD RUN OUT BEFORE YOU GOT MONEY TO BUY MORE.: SOMETIMES TRUE

## 2025-01-29 SDOH — ECONOMIC STABILITY: HOUSING INSECURITY: IN THE LAST 12 MONTHS, HOW MANY PLACES HAVE YOU LIVED?: 1

## 2025-01-29 SDOH — ECONOMIC STABILITY: FOOD INSECURITY: WITHIN THE PAST 12 MONTHS, THE FOOD YOU BOUGHT JUST DIDN'T LAST AND YOU DIDN'T HAVE MONEY TO GET MORE.: NEVER TRUE

## 2025-01-29 SDOH — ECONOMIC STABILITY: FOOD INSECURITY

## 2025-01-29 SDOH — ECONOMIC STABILITY: TRANSPORTATION INSECURITY

## 2025-01-29 SDOH — ECONOMIC STABILITY: GENERAL

## 2025-01-29 ASSESSMENT — COGNITIVE AND FUNCTIONAL STATUS - GENERAL
MOBILITY SCORE: 24
DAILY ACTIVITIY SCORE: 24

## 2025-01-29 ASSESSMENT — ACTIVITIES OF DAILY LIVING (ADL)
WALKS_IN_HOME: NEED SOME HELP
ASSISTIVE_DEVICES: WHEELCHAIR
HEARING_RIGHT_EAR: DIFFICULTY WITH NOISE
HOW_WELL_CAN_YOU_BATHE_YOURSELF: NEED SOME HELP
HOW_WELL_CAN_YOU_DRESS_YOURSELF: NEED SOME HELP
TOILETING: NEEDS ASSISTANCE
ASSISTIVE_DEVICES: SHOWER CHAIR
HOW_WELL_CAN_YOU_COMPLETE_GROOMING_TASKS: NEED SOME HELP
ADL_BEFORE_ADMISSION: LEFT
ASSISTIVE_DEVICES: WALKER
ADEQUATE_TO_COMPLETE_ADL: NO
ADL_BEFORE_ADMISSION: RIGHT
ADL_BEFORE_ADMISSION: NEED SOME HELP
BATHING: NEEDS ASSISTANCE
ADEQUATE_TO_COMPLETE_ADL: NO
ASSISTIVE_DEVICES: REACHER
HOW_WELL_CAN_YOU_FEED_YOURSELF: NEED SOME HELP
LACK_OF_TRANSPORTATION: NO
ADL_BEFORE_ADMISSION: LEFT
FEEDING: NEEDS ASSISTANCE
HOW_WELL_CAN_YOU_USE_BATHROOM_BY_YOURSELF: NEED SOME HELP
LACK_OF_TRANSPORTATION: NO
ASSISTIVE_DEVICES: COMMODE
DRESSING: NEEDS ASSISTANCE
HEARING_LEFT_EAR: NO PROBLEMS
ASSISTIVE_DEVICES: MAGNIFYING GLASS

## 2025-01-29 ASSESSMENT — PAIN SCALES - GENERAL
PAINLEVEL_OUTOF10: 0 - NO PAIN
PAINLEVEL_OUTOF10: 5 - MODERATE PAIN

## 2025-01-29 ASSESSMENT — SOCIAL DETERMINANTS OF HEALTH (SDOH): IN THE PAST 12 MONTHS, HAS THE ELECTRIC, GAS, OIL, OR WATER COMPANY THREATENED TO SHUT OFF SERVICE IN YOUR HOME?: NO

## 2025-01-29 ASSESSMENT — PAIN - FUNCTIONAL ASSESSMENT: PAIN_FUNCTIONAL_ASSESSMENT: 0-10

## 2025-01-29 NOTE — CARE PLAN
The patient's goals for the shift include  rest.    The clinical goals for the shift include Patient will remain HDS overnight.

## 2025-01-29 NOTE — PROGRESS NOTES
"Jonathan Ayala is a 49 y.o. female on day 0 of admission presenting with IIH (idiopathic intracranial hypertension).    Subjective   Improved headache after LP    Objective     Physical Exam  A&Ox3  Face symmetric  RUE 5/5  LUE 5/5  RLE 5/5  LLE 5/5  Sensation intact to light touch throughout all extremities  Counts fingers  Incision with scabbing at anterior portion, reported leakage    Last Recorded Vitals  Blood pressure 149/86, pulse 80, temperature 36.9 °C (98.4 °F), temperature source Temporal, resp. rate 20, height 1.575 m (5' 2\"), weight 115 kg (253 lb), SpO2 99%.  Intake/Output last 3 Shifts:  No intake/output data recorded.    Relevant Results         Assessment/Plan   Assessment & Plan  IIH (idiopathic intracranial hypertension)    h/o IIH (dx 2/2022 w/ OP 25) s/p R frontal bolt c/b tract hemorrhage and focal epilepsy, right hemiplegic migraines, CVID on monthly IVIG infusions, Sjogren's syndrome/scleroderma, POTS, T2DM, HTN, hypothyroidism, BRENT on CPAP, anxiety/depression, 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 10/30 s/p R VPS exploration w abdominal catheter repositioning w improvement in distal runnoff, 11/6 p/w min clear incisional drainage, min decr in vision, incision oversewn with no further leakage noted, 11/15 p/w fluid leaking from incision site, HA 11/15 s/p RF shunt wound revision and fractured valve replacement, 12/9 p/w wound dehiscence/incisional leakage, 12/10 s/p R cranial wound exploration, washout, revision, 1/8 p/w double vision, nausea and vomiting, and 2 seizure events, s/p LP (OP 27), certas dialed to 4, p/w drainage from shunt site, cough, temporal vision changes, CTH stable, SS certas 4, 1/28 s/p LP (OP31)    PLAN  Obs  OR planning for shunt exploration/revision  Ophtho recs  CSF Cx  ENT c/s for assistance in OR for wound closure  PTOT  SCDs    Jesus Lambert MD      "

## 2025-01-29 NOTE — PROGRESS NOTES
Readmission to hospital: Patient discharged from the hospital on 1/11 with discharge diagnosis: seizure. Readmitted to hospital on 1/28 with admission diagnosis: IIH. Medical team aware of readmission.       01/29/25 1100   Discharge Planning   Living Arrangements Spouse/significant other   Support Systems Spouse/significant other   Assistance Needed HHA and spouse   Type of Residence Private residence   Home or Post Acute Services In home services   Type of Home Care Services Home health aide   Expected Discharge Disposition Home Health   Does the patient need discharge transport arranged? No   Financial Resource Strain   How hard is it for you to pay for the very basics like food, housing, medical care, and heating? Somewhat   Housing Stability   In the last 12 months, was there a time when you were not able to pay the mortgage or rent on time? N   At any time in the past 12 months, were you homeless or living in a shelter (including now)? N   Transportation Needs   In the past 12 months, has lack of transportation kept you from medical appointments or from getting medications? no   In the past 12 months, has lack of transportation kept you from meetings, work, or from getting things needed for daily living? No     TCC admission assessment completed. Explained role of TCC - verbalized understanding.     Demographics/Insurance: Confirmed in chart.   Living Environment: Lives at home with spouse who assists with ADLS.   Primary Support Person: Spouse, Micha, [phone]  PCP: Dr. Mensah  Assistive Devices/DME: cane, walker, shower chair, quad cane, bedside commode   Home Care Agency: [facility] - has private HHA  Transportation at Discharge: Spouse transports  Pharmacy: [facility] in [facility]  Concerns about discharge: None at this time.     Patient is obs status. OR planning for shunt exploration/revision.     Kayla Love RN, TCC

## 2025-01-29 NOTE — ANESTHESIA PREPROCEDURE EVALUATION
Patient: Jonathan Ayala    Procedure Information       Date/Time: 01/30/25 1010    Procedure: Exploration and revision of ventriculoperitoneal shunt; revision and washout of cranial wound (Right: Head) - Please schedule to follow case in room 23    Location: [facility] OR 23 / [facility] OR    Surgeons: Avelino Tafoya MD            ALLERGIES:  Allergies   Allergen Reactions   • Acetazolamide Hives, Rash, Shortness of breath and Swelling     oral hives, redness, ABD pain   • Atorvastatin Unknown     Causes muscle pain   • Cefdinir Itching, Rash, Shortness of breath and Anaphylaxis     Itchy throat    Throat closing / difficulty breathing.  Took Benadryl at home.    Itchy throat      Throat closing / difficulty breathing.  Took Benadryl at home.   • Ceftriaxone Anaphylaxis, Rash, Shortness of breath and Swelling     SOB    SOB hives turned red during gallbladder   • Doxepin Anaphylaxis, Hives, Swelling, Angioedema and Rash     Itchy sore throat problems with swallowing    facial swelling    Itchy sore throat problems with swallowing      facial swelling   • Duloxetine Anaphylaxis, Hallucinations and Rash     suicidal ideation    suicidal ideation     Other reaction(s): suicidal ideation    suicidal    Suicidal ideation   • Fd And C Red No.40 Anaphylaxis   • Levofloxacin Angioedema, Swelling and Rash     eyelid swelling    eyelid swelling. Patient tolerates Avelox   • Levofloxacin In D5w Anaphylaxis     Moon face lips swelling just Levaquin tolerated D5W)   • Nutritional Supplements Anaphylaxis     Grapes ( red dye   • Ozempic [Semaglutide] Nausea/vomiting     Severe gastroparesis worsening    • Prochlorperazine Anaphylaxis     HIGH Compazine involuntary muscle spasms low doses tolerated)   • Red Dye Anaphylaxis   • Becky Anaphylaxis and Swelling     Becky    SOB facial swelling   • Rosemary Oil Anaphylaxis, Itching and Swelling     Becky  SOB facial swelling      SOB facial swelling   • Strawberry  "Shortness of breath     White blisters in mouth      Other reaction(s): white blisters in mouth, shortness of breath   • Sulfa (Sulfonamide Antibiotics) Anaphylaxis, Rash, Shortness of breath and Swelling     SOB itchy hives    Other Reaction(s): rash, SOB      SOB itchy hives   • Topiramate Anaphylaxis, Swelling and Angioedema     eyelid swelling    Throat swelling    eyelid swelling  Throat swelling      Throat swelling      eyelid swelling   • Tree Pollen-Black Picacho Shortness of breath     Other Reaction(s): Other: See Comments      White blisters in mouth      blisters in mouth-carries an EPIPEN-\"I have gotten short of breath\"   • Tree Pollen-Pecan Shortness of breath     pecans   • Picacho Shortness of breath   • Aripiprazole Unknown, Fever and Other     fever and tremors    Fevers and Tremors    Tremor   • Aspartame Diarrhea     Blood sugar [place], Diarrhea   • Aspartame (Bulk) Diarrhea, Nausea Only and Nausea/vomiting     Other Reaction(s): nausea, diarrhea, abdominal pain      Blood sugar [place], Diarrhea   • Fenofibrate Other     Double vision   • Gluten Itching, Other and Rash     Rashes constipation gastric discomfort   • Hydrochlorothiazide Hives, Itching and Rash     hctz removed as free-text allergy and entered as [facility] allergy,1455 10/6/2007JO      itchy hives   • Hydromorphone Unknown, GI intolerance and Nausea Only     Intolerance nausea instant   • Iron Hives and Rash     ichy hive ( can tolerate ferrous gluconate)   • Meclizine Angioedema, Other and Swelling     eyelid swelling    Swelling of the eyelids    Eyelids and face   • Metformin Other     Other reaction(s): Dyspepsia, Dyspepsia   • Propoxyphene Unknown and Hives     Darvocet itchy hives   • Statins-Hmg-Coa Reductase Inhibitors Unknown     Double vision   • Tetracyclines Hives, Itching and Rash     sulfa    Rash itching    Itchy  rash   • Thiazides Hives, Itching and Rash     itchy hives   • Venlafaxine Unknown and Fever     Fevers chills "   • Wheat Unknown     oral blisters   • Adhesive Tape-Silicones Itching and Other   • Barbiturates Unknown   • Ciprofloxacin Hives   • Dhe Unknown     Raynaud's disease   • Diet Foods Diarrhea     Aspartame allergy only. Removes to many foods to interface.   • Farxiga [Dapagliflozin] Other     Frequent yeast infections and UTI   • Ferrous Sulfate Hives   • Keflex [Cephalexin] Itching   • L.Acidoph-L.Bulg-B.Bif-S.Therm GI intolerance   • Milk Containing Products (Dairy) GI Upset   • Nsaids (Non-Steroidal Anti-Inflammatory Drug) Unknown and Nausea/vomiting   • Other Unknown   • Sulfonylureas Unknown   • Tobramycin Unknown   • Vancomycin Unknown and Hives     Niyah syndrome    Other reaction(s): Other    Red man syndrome if given fast, benadryl with.     Niyah syndrome  Other reaction(s): Other      Other reaction(s): Other      Red man syndrome if given fast, benadryl with.   • Adhesive Rash   • Azithromycin Hives, Itching and Rash   • Betamethasone Nausea And Vomiting, Other, GI intolerance and Diarrhea     N/V/D   • House Dust Rash   • Metoclopramide Hcl Other   • Prednisone Rash   • Propoxyphene-Acetaminophen Hives and Rash   • Sulfacetamide Sodium Rash and Swelling   • Igpypuxd-9-Fy7 Antimigraine Agents Nausea Only         None due to Raynaud's  ( Triptans cause a stroke)   • Zolpidem Other and Hallucinations     Sleep walk    Other Reaction(s): insomnia and agitation      Sleep walk        MEDICAL HISTORY:  Past Medical History:   Diagnosis Date   • Abnormal findings on diagnostic imaging of other abdominal regions, including retroperitoneum 10/14/2020    Abnormal CT of the abdomen   • Acquired deformity of nose 03/24/2022    Nasal deformity   • Acute upper respiratory infection, unspecified 10/16/2019    Acute URI   • Adrenal disease (Multi)    • Allergic    • Allergy status to unspecified drugs, medicaments and biological substances 05/22/2020    History of drug allergy   • Allergy status to unspecified  drugs, medicaments and biological substances 11/13/2020    History of adverse drug reaction   • Anemia    • Anxiety 2005   • Asthma    • Benign intracranial hypertension 01/27/2022    Pseudotumor cerebri   • Bipolar disorder, unspecified (Multi)     Bipolar disease, chronic   • Breast calcification, right 08/21/2018   • Cellulitis of abdominal wall 09/28/2022    Cellulitis of right abdominal wall   • Cervicalgia 07/01/2020    Cervicalgia of akqdivnh-ftxrzdg-ncewa region   • Chronic maxillary sinusitis 01/04/2022    Chronic maxillary sinusitis   • Chronic sialoadenitis 03/16/2020    Chronic sialoadenitis   • COVID-19 01/06/2022    COVID-19 with multiple comorbidities   • Decreased white blood cell count, unspecified 11/04/2019    Leukopenia   • Disease of thyroid gland    • Disturbances of salivary secretion 03/16/2020    Xerostomia   • Dry eye syndrome of bilateral lacrimal glands 10/07/2022    Dry eyes, bilateral   • Encounter for preprocedural cardiovascular examination 02/01/2022    Preoperative cardiovascular examination   • Food additives allergy status 06/11/2020    Allergy to food dye   • Fracture of nasal bones, initial encounter for closed fracture 03/03/2022    Closed fracture of nasal bone, initial encounter   • GERD (gastroesophageal reflux disease) 15 years old   • Granuloma of right orbit 10/07/2021    Inflammatory pseudotumor of right orbit   • History of endometrial ablation 11/09/2017   • Hyperlipidemia    • Hypertension    • Hyperthyroidism    • Hypoglycemia    • Hypothyroidism    • Immunocompromised    • Localized swelling, mass and lump, head 03/24/2022    Swollen nose   • Major depressive disorder, recurrent, in full remission (CMS-HCC) 10/07/2021    Depression, major, recurrent, in complete remission   • Mammary duct ectasia of left breast 08/24/2022    Periductal mastitis of left breast   • Meningitis ([facility]) 2008   • Migraine    • Nipple discharge 08/24/2022    Bloody discharge from left  nipple   • Ocular pain, right eye 10/07/2022    Pain in right eye   • Optic atrophy    • Other abnormal and inconclusive findings on diagnostic imaging of breast 07/06/2020    Other abnormal and inconclusive findings on diagnostic imaging of breast   • Other anomalies of pupillary function 05/31/2019    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   • Other chest pain 05/18/2020    Chest discomfort   • Other conditions influencing health status 08/01/2022    History of cough   • Other conditions influencing health status 08/03/2021    Chronic migraine   • Other specified disorders of eustachian tube, left ear 11/18/2019    ETD (Eustachian tube dysfunction), left   • Other specified disorders of nose and nasal sinuses 03/24/2022    Nasal dryness   • Other specified disorders of nose and nasal sinuses 03/24/2022    Nasal crusting   • Pelvic and perineal pain 07/06/2020    Pelvic pain   • Personal history of other diseases of the circulatory system 04/07/2020    History of sinus tachycardia   • Personal history of other diseases of the circulatory system 04/07/2020    History of abnormal electrocardiography   • Personal history of other diseases of the circulatory system     History of Raynaud's syndrome   • Personal history of other diseases of the digestive system     History of irritable bowel syndrome   • Personal history of other diseases of the digestive system 03/02/2020    History of oral pain   • Personal history of other diseases of the musculoskeletal system and connective tissue 01/19/2022    History of neck pain   • Personal history of other diseases of the musculoskeletal system and connective tissue 03/02/2021    History of scleroderma   • Personal history of other diseases of the musculoskeletal system and connective tissue 06/16/2020    History of muscle weakness   • Personal history of other diseases of the nervous system and sense organs 11/18/2019    History of hearing loss   • Personal history of  other diseases of the nervous system and sense organs 09/21/2022    History of partial seizures   • Personal history of other diseases of the respiratory system 04/14/2021    History of asthma   • Personal history of other endocrine, nutritional and metabolic disease 02/17/2021    History of diabetes mellitus   • Personal history of other mental and behavioral disorders 05/27/2021    History of anxiety   • Personal history of other specified conditions 09/07/2022    History of nipple discharge   • Personal history of other specified conditions 10/16/2019    History of headache   • Personal history of other specified conditions 09/28/2022    History of lump of left breast   • Personal history of other specified conditions 09/16/2021    History of persistent cough   • Personal history of other specified conditions 02/01/2022    History of palpitations   • Personal history of other specified conditions 03/09/2022    History of headache   • Personal history of other specified conditions 02/12/2014    History of chest pain   • Personal history of other specified conditions 10/27/2021    History of nausea and vomiting   • Personal history of other specified conditions 10/16/2019    History of fatigue   • Personal history of other specified conditions 02/26/2021    History of orthopnea   • Personal history of other specified conditions 02/22/2021    History of shortness of breath   • Personal history of urinary calculi     H/O renal calculi   • Polycystic ovary syndrome    • Postural orthostatic tachycardia syndrome (POTS)     POTS (postural orthostatic tachycardia syndrome)   • Rash and other nonspecific skin eruption 03/15/2022    Rash   • Repeated falls 06/23/2021    Recurrent falls   • Right lower quadrant pain 10/14/2020    Abdominal pain, RLQ (right lower quadrant)   • Seizures (Multi)    • Sjogren syndrome, unspecified (Multi)     History of Sjogren's disease   • Sjogren's syndrome    • Sleep apnea    • Slow transit  constipation 07/09/2020    Slow transit constipation   • Subarachnoid hemorrhage, traumatic (Multi) 04/19/2023   • Thyroid nodule    • Traumatic subarachnoid hemorrhage with loss of consciousness of unspecified duration, subsequent encounter 03/15/2022    Subarachnoid hemorrhage following injury, with loss of consciousness, subsequent encounter   • Traumatic subarachnoid hemorrhage without loss of consciousness, subsequent encounter     Subarachnoid hemorrhage following injury, no loss of consciousness, subsequent encounter   • Type 2 diabetes mellitus    • Unspecified disorder of refraction 10/07/2022    Refractive error   • Unspecified optic neuritis 11/06/2020    Right optic neuritis   • Unspecified optic neuritis 11/06/2020    Optic neuritis, right   • Unspecified visual loss 09/25/2019    Vision loss   • Varicella As a child   • Venous insufficiency (chronic) (peripheral) 10/18/2021    Chronic venous insufficiency of lower extremity   • Viral infection, unspecified 01/11/2022    Nonspecific syndrome suggestive of viral illness   • Vitamin D deficiency    • Vitamin D deficiency, unspecified 09/28/2022    Vitamin D deficiency        Relevant Problems   Cardiac   (+) Benign essential hypertension   (+) Dyslipidemia, goal LDL below 100      Pulmonary   (+) Moderate persistent allergic asthma ([facility])      Neuro   (+) IIH (idiopathic intracranial hypertension)   (+) Idiopathic intracranial hypertension   (+) Lumbar radiculopathy   (+) Major depressive disorder, recurrent, in full remission with anxious distress ([facility])   (+) Seizure disorder (Multi)      GI   (+) Irritable bowel syndrome with diarrhea      Endocrine   (+) Class 3 severe obesity due to excess calories with serious comorbidity and body mass index (BMI) of 40.0 to 44.9 in adult   (+) Diabetic gastroparesis associated with type 2 diabetes mellitus (Multi)   (+) Hypothyroidism   (+) Type 2 diabetes mellitus with hyperglycemia, with long-term current  use of insulin      ID   (+) Vaginal moniliasis        SURGICAL HISTORY:  Past Surgical History:   Procedure Laterality Date   • APPENDECTOMY  2017   • BRAIN SURGERY  [place] placement to measure pressure   • CARDIAC CATHETERIZATION     • CHOLECYSTECTOMY     • ENDOMETRIAL ABLATION     • FRACTURE SURGERY  12/2023   • HERNIA REPAIR Right 03/01/2024    with mesh   • HYSTERECTOMY  2017   • MR HEAD ANGIO WO IV CONTRAST  02/08/2021    MR HEAD ANGIO WO IV CONTRAST [facility]2021 [facility]   • MR NECK ANGIO WO IV CONTRAST  02/08/2021    MR NECK ANGIO WO IV CONTRAST 2/8/2021 [facility]   • OTHER SURGICAL HISTORY  08/22/2019    Carpal tunnel surgery   • OTHER SURGICAL HISTORY  08/22/2019    Hysterectomy   • OTHER SURGICAL HISTORY  08/22/2019    Venous access port placement   • OTHER SURGICAL HISTORY  08/22/2019    Cholecystectomy   • OTHER SURGICAL HISTORY  08/22/2019    Appendectomy   • OTHER SURGICAL HISTORY  08/22/2019    Pyloroplasty   • WISDOM TOOTH EXTRACTION  2004        VITALS:      1/29/2025    11:30 AM 1/29/2025     8:02 AM 1/29/2025     4:33 AM   Vitals   Systolic 110 108 109   Diastolic 73 62 74   BP Location Right arm Left arm    Heart Rate 90 88 91   Temp 35.1 °C (95.2 °F) 35.5 °C (95.9 °F) 35.9 °C (96.6 °F)   Resp 18 18 20       LABS:     Lab Results   Component Value Date    GLUCOSE 112 (H) 01/28/2025    CALCIUM 9.7 01/28/2025     01/28/2025    K 4.1 01/28/2025    CO2 30 01/28/2025     01/28/2025    BUN 15 01/28/2025    CREATININE 0.92 01/28/2025     Lab Results   Component Value Date    ALT 22 01/28/2025    AST 16 01/28/2025    ALKPHOS 95 01/28/2025    BILITOT 0.4 01/28/2025   ,  Lab Results   Component Value Date    HGBA1C 7.2 (H) 09/20/2024       IMAGES:      SOCIAL:  Social History     Tobacco Use   Smoking Status Never   Smokeless Tobacco Never      Social History     Substance and Sexual Activity   Alcohol Use Not Currently    Comment: Socially in College      Social History      Substance and Sexual Activity   Drug Use Yes   • Types: Benzodiazepines        NPO STATUS:  No data recorded    Clinical Areas Reviewed:   Tobacco  Allergies  Meds   Med Hx  Surg Hx   Fam Hx  Soc Hx      Physical Exam    Airway  Mallampati: III  TM distance: <3 FB  Neck ROM: full     Cardiovascular    Dental    Pulmonary    Abdominal        Anesthesia Plan    History of general anesthesia?: yes  History of complications of general anesthesia?: no    ASA 3     general     Anesthetic plan and risks discussed with patient.  Use of blood products discussed with patient who consented to blood products.    Plan discussed with CRNA.

## 2025-01-29 NOTE — PROGRESS NOTES
Pharmacy Medication History Review    Jonathan Ayala is a 49 y.o. female admitted for IIH (idiopathic intracranial hypertension). Pharmacy reviewed the patient's ulots-[id number] medications for accuracy.    Medications ADDED:  none  Medications CHANGED:  Tizanidine, senna, hydrocortisone  Medications REMOVED:   Dicyclomine, lacosamide 50mg tablet, xzyzal     The list below reflects the updated PTA list.   Prior to Admission Medications   Prescriptions Last Dose Informant   Advair -21 mcg/actuation inhaler  Self   Sig: INHALE TWO PUFFS BY MOUTH AS INSTRUCTED TWO TIMES A DAY.   Dexcom G7  misc  Self   Sig: Use as instructed to check blood sugars continuously throughout the day   EPINEPHrine 0.3 mg/0.3 mL injection syringe  Self   Sig: INJECT INTRAMUSCULARLY ONCE AS NEEDED FOR ANAPHYLAXIS   Motegrity 2 mg tablet  Self   Sig: Take 1 tablet (2 mg) by mouth once daily.   OneTouch Delica Plus Lancet 33 gauge misc  Self   Sig: USE 1 LANCET TO CHECK GLUCOSE ONCE DAILY AS DIRECTED   OneTouch Verio test strips strip  Self   Sig: USE 1 STRIP TO CHECK GLUCOSE ONCE DAILY AS DIRECTED   ZINC ORAL  Self   Sig: Take 50 mg by mouth once daily.   acetaminophen (Tylenol) 325 mg tablet  Self   Sig: Take 1-2 tablets (325-650 mg) by mouth every 6 hours if needed for mild pain (1 - 3). Days of infusions   amitriptyline (Elavil) 100 mg tablet  Self   Sig: Take 1 tablet (100 mg) by mouth once daily at bedtime.   amoxicillin-pot clavulanate (Augmentin) 875-125 mg tablet  Self   Sig: Take 1 tablet by mouth every 12 hours for 7 days.   azelastine (Astelin) 137 mcg (0.1 %) nasal spray  Self   Sig: Administer 1 spray into each nostril 2 times a day. Use in each nostril as directed   blood-glucose sensor (DEXCOM G7 SENSOR MISC)  Self   Sig: Use to check blood sugar continuously throughout the day as directed. Change sensor every 10 days.   calcium-vitamin D3-vitamin K (Viactiv) 650 mg-12.5 mcg-40 mcg chewable tablet  Self   Sig:  Chew 2 tablets once daily. At lunch   carboxymethylcellulose (Refresh Celluvisc) 1 % ophthalmic solution dropperette  Self   Sig: Administer 2 drops into both eyes 3 times a day as needed for dry eyes.   cholecalciferol (Vitamin D-3) 5,000 Units tablet  Self   Sig: Take 1 tablet (5,000 Units) by mouth once daily.   clonazePAM (KlonoPIN) 0.5 mg tablet  Self   Sig: Take 1 tablet (0.5 mg) by mouth 2 times a day.   diclofenac (Voltaren) 50 mg EC tablet  Self   Sig: Take 1 tablet (50 mg) by mouth 3 times a day as needed (migraines).   divalproex (Depakote ER) 500 mg 24 hr tablet  Self   Sig: Take 1 tablet (500 mg) by mouth 2 times a day.   ezetimibe (Zetia) 10 mg tablet  Self   Sig: Take 1 tablet (10 mg) by mouth once daily.   fluticasone (Flonase) 50 mcg/actuation nasal spray  Self   Sig: USE 1 SPRAY INTO EACH NOSTRIL ONCE DAILY.   folic acid (Folvite) 1 mg tablet  Self   Sig: Take 1 tablet (1 mg) by mouth once daily.   furosemide (Lasix) 20 mg tablet  Self   Sig: Take 1 tablet (20 mg) by mouth 2 times a day.   gloves, latex with aloe vera misc  Self   Sig: Large size gloves   glucagon (Baqsimi) 3 mg/actuation spray,non-aerosol  Self   Sig: USE ONE SPRAY IN THE NOSE AS NEEDED FOR LOW BLOOD SUGAR  MAY REPEAT AFTER 15 MINUTES USING A NEW DEVICE IF NO RESPONSE   glucagon (Glucagen) 1 mg injection  Self   Sig: Inject 1 mg under the skin 1 time if needed for low blood sugar - see comments (hypoglycemia).   hydrocortisone (Cortef) 10 mg tablet  Self   Sig: Take 1 tablet (10 mg) by mouth 2 times a day. Double the dose if sick for three days   Patient taking differently: Take 1 tablet (10 mg) by mouth once daily. Double the dose if sick for three days   immune globulin, human, (Gammagard) infusion 1/17/2025 Self   Sig: Infuse 600 mL (60 g) into a venous catheter every 14 (fourteen) days.   insulin glargine (Toujeo Max Solostar- 2 unit dial) 300 unit/mL (3 mL) injection  Self   Sig: Inject 54 units under the skin once daily  "  Patient taking differently: Inject 54 Units under the skin once daily in the morning. Inject 54 units under the skin once daily   insulin lispro (HumaLOG KwikPen Insulin) 100 unit/mL injection  Self   Sig: Use as directed to give up to 100 units a day   ipratropium-albuteroL (Duo-Neb) 0.5-2.5 mg/3 mL nebulizer solution  Self   Sig: Inhale 3 mL 4 times a day as needed.   lacosamide (Vimpat) 150 mg tablet tablet  Self   Sig: Take 2 tablets (300 mg) by mouth 2 times a day.   lasmiditan 100 mg tablet  Self   Sig: Take 100 mg by mouth if needed (migraine). Do not drive in 8 hours of taking this medicine. Maximum one dose per 24 hours.   levETIRAcetam (Keppra) 750 mg tablet  Self   Sig: Take 2 tablets (1,500 mg) by mouth 2 times a day.   levothyroxine (Synthroid, Levoxyl) 50 mcg tablet  Self   Sig: Take 1.5 tablets (75 mcg) by mouth once daily in the morning. Take before meals.   miconazole (Micotin) 2 % cream  Self   Sig: Apply 1 Application topically once daily as needed (rash).   miconazole (Micotin) 2 % powder  Self   Sig: Apply to groin , under the breast and skin folds daily   miscellaneous medical supply misc  Self   Sig: Bath Wipes  fragrance free   moisturizing mouth (Biotene Oral Dry Mouth) solution  Self   Sig: Take 1 spray by mouth 4 times a day as needed.   montelukast (Singulair) 10 mg tablet  Self   Sig: TAKE ONE TABLET BY MOUTH EVERY DAY AT BEDTIME   nasal spray Nayzilam 5 mg/spray (0.1 mL) spray,non-aerosol  Self   Sig: Administer into affected nostril(s) 1 time.   ondansetron ODT (Zofran-ODT) 4 mg disintegrating tablet  Self   Sig: Take 1 tablet (4 mg) by mouth every 8 hours if needed for nausea or vomiting.   pantoprazole (Protonix) 40 mg EC tablet  Self   Sig: Take 1 tablet (40 mg) by mouth 2 times a day before meals.   pen needle, diabetic (BD Lela 2nd Gen Pen Needle) 32 gauge x 5/32\" needle  Self   Sig: Use as directed with insulin pen up to 5 times daily   polyethylene glycol (Glycolax, Miralax) " "17 gram/dose powder  Self   Sig: Mix 17 g of powder and drink 3 times a day as needed for constipation.   propranolol LA (Inderal LA) 60 mg 24 hr capsule  Self   Sig: Take 1 capsule (60 mg) by mouth once daily. Do not crush, chew, or split.   rOPINIRole (Requip) 0.5 mg tablet  Self   Sig: Take 1 tablet by mouth (0.5mg) in the morning and 2 tablets (1mg) by mouth in the evening   senna 8.6 mg tablet  Self   Sig: Take 1 tablet (8.6 mg) by mouth once daily at bedtime.   tiZANidine (Zanaflex) 2 mg tablet  Self   Sig: Take 1 tablet (2 mg) by mouth 2 times a day.   ubrogepant (Ubrelvy) 100 mg tablet tablet  Self   Sig: Take 1 tablet (100 mg) by mouth if needed (migraine). May repeat in 2 hours for max of 200mg per 24 hours.      Facility-Administered Medications: None          Patient declines M2B at discharge.     Sources:   [facility]  Pharmacy dispense history  Patient interview very good historian, when drug name read off to her she knew all medication strengths and current directions.  All listed meds filled recently at [facility]  Chart Review  Care Everywhere    Additional Comments:  none      Doug Regalado, PharmD  Transitions of Care Pharmacist  01/28/25     Secure Chat preferred   If no response call [phone] or [email or web address] \"Med Rec\"    "

## 2025-01-29 NOTE — PROCEDURES
Lumbar Puncture    Date/Time: 1/29/2025 8:55 AM    Performed by: Eder Melissa MD  Authorized by: Avelino Tafoya MD    Consent:     Consent obtained:  Written    Consent given by:  Patient    Risks, benefits, and alternatives were discussed: yes      Risks discussed:  Bleeding, infection and nerve damage  Universal protocol:     Procedure explained and questions answered to patient or proxy's satisfaction: yes      Relevant documents present and verified: yes      Test results available: yes      Imaging studies available: yes      Required blood products, implants, devices, and special equipment available: yes      Immediately prior to procedure a time out was called: yes      Site/side marked: yes      Patient identity confirmed:  Arm band  Post-procedure details:     Procedure completion:  Tolerated well, no immediate complications  Comments:      A time-out was performed.  The patient was placed in the LEFT lateral decubitus position in a semi-fetal position with help from the nursing staff.  The area was cleansed and draped in usual sterile fashion.  Anesthesia was achieved with 1% lidocaine.  A 20-gauge 3.5in spinal needle was placed in the L4-L5 interspace.  Clear cerebral spinal fluid was obtained. Opening pressure was 31. Closing pressure was 18. These were sent for the usual tests. The patient had no immediate complications and tolerated the procedure well.

## 2025-01-29 NOTE — HOSPITAL COURSE
Jonathan Ayala is a 49 y.o. 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 Patient has required several recent admissions for shunt workup and revision including 10/30 s/p R VPS exploration with abdominal catheter repositioning with improvement in distal runnoff; 11/6 presenting with minimal clear incisional drainage, decrease in vision, incision oversewn with no further leakage noted; 11/15 presenting with fluid leaking from incision site, HA s/p RF shunt wound revision and fractured valve replacement; 12/9 presenting with wound dehiscence/incisional leakage s/p R cranial wound exploration, washout, revision; 1/8 presenting with double vision, nausea and vomiting, and 2 seizure events, s/p LP (OP 27), certas dialed to 4. Patient returned to the [facility] ED on 1/28 with drainage from shunt site, cough and temporal vision changes. CT head stable and SS certas 4. Patient underwent LP with opening pressure of 31. Ophthalmology evaluation with decreased visual acuity, but no noted papilledema. She was admitted to neurosurgery service for further evaluation and management.     1/29 vCTH complete.   1/30 s/p valve exchange (certas at 4)   1/31 Minimal increase in sCre (1.22) s/p fluid bolus with appropriate downtrend. Staple ophthalmology exam.     On the day of discharge, the patient was seen and evaluated by the neurosurgery team and deemed suitable for discharge. The patient was given detailed discharge instructions and were scheduled to follow up as an outpatient.

## 2025-01-30 ENCOUNTER — ANESTHESIA (OUTPATIENT)
Dept: OPERATING ROOM | Facility: HOSPITAL | Age: 50
End: 2025-01-30
Payer: MEDICAID

## 2025-01-30 LAB
ATRIAL RATE: 80 BPM
BACTERIA CSF CULT: NORMAL
GLUCOSE BLD MANUAL STRIP-MCNC: 138 MG/DL (ref 74–99)
GLUCOSE BLD MANUAL STRIP-MCNC: 154 MG/DL (ref 74–99)
GLUCOSE BLD MANUAL STRIP-MCNC: 188 MG/DL (ref 74–99)
GLUCOSE BLD MANUAL STRIP-MCNC: 197 MG/DL (ref 74–99)
GLUCOSE BLD MANUAL STRIP-MCNC: 201 MG/DL (ref 74–99)
GRAM STN SPEC: NORMAL
GRAM STN SPEC: NORMAL
P AXIS: 53 DEGREES
P OFFSET: 188 MS
P ONSET: 125 MS
PR INTERVAL: 188 MS
Q ONSET: 219 MS
QRS COUNT: 13 BEATS
QRS DURATION: 94 MS
QT INTERVAL: 418 MS
QTC CALCULATION(BAZETT): 482 MS
QTC FREDERICIA: 460 MS
R AXIS: 20 DEGREES
T AXIS: 88 DEGREES
T OFFSET: 428 MS
VENTRICULAR RATE: 80 BPM

## 2025-01-30 PROCEDURE — 3700000001 HC GENERAL ANESTHESIA TIME - INITIAL BASE CHARGE: Performed by: NEUROLOGICAL SURGERY

## 2025-01-30 PROCEDURE — 0JWS0JZ REVISION OF SYNTHETIC SUBSTITUTE IN HEAD AND NECK SUBCUTANEOUS TISSUE AND FASCIA, OPEN APPROACH: ICD-10-PCS | Performed by: NEUROLOGICAL SURGERY

## 2025-01-30 PROCEDURE — 2720000007 HC OR 272 NO HCPCS: Performed by: NEUROLOGICAL SURGERY

## 2025-01-30 PROCEDURE — 2500000005 HC RX 250 GENERAL PHARMACY W/O HCPCS: Mod: SE | Performed by: NEUROLOGICAL SURGERY

## 2025-01-30 PROCEDURE — 1200000002 HC GENERAL ROOM WITH TELEMETRY DAILY

## 2025-01-30 PROCEDURE — 2500000001 HC RX 250 WO HCPCS SELF ADMINISTERED DRUGS (ALT 637 FOR MEDICARE OP): Mod: SE

## 2025-01-30 PROCEDURE — 2500000004 HC RX 250 GENERAL PHARMACY W/ HCPCS (ALT 636 FOR OP/ED): Mod: SE

## 2025-01-30 PROCEDURE — 2500000001 HC RX 250 WO HCPCS SELF ADMINISTERED DRUGS (ALT 637 FOR MEDICARE OP): Mod: SE | Performed by: NEUROLOGICAL SURGERY

## 2025-01-30 PROCEDURE — 2500000002 HC RX 250 W HCPCS SELF ADMINISTERED DRUGS (ALT 637 FOR MEDICARE OP, ALT 636 FOR OP/ED): Mod: SE

## 2025-01-30 PROCEDURE — 2500000004 HC RX 250 GENERAL PHARMACY W/ HCPCS (ALT 636 FOR OP/ED): Mod: SE | Performed by: PHYSICIAN ASSISTANT

## 2025-01-30 PROCEDURE — A62230 PR REPLACEMENT/REVISION,CSF SHUNT: Performed by: ANESTHESIOLOGY

## 2025-01-30 PROCEDURE — 2500000002 HC RX 250 W HCPCS SELF ADMINISTERED DRUGS (ALT 637 FOR MEDICARE OP, ALT 636 FOR OP/ED): Mod: SE | Performed by: NEUROLOGICAL SURGERY

## 2025-01-30 PROCEDURE — C1752 CATH,HEMODIALYSIS,SHORT-TERM: HCPCS | Performed by: NEUROLOGICAL SURGERY

## 2025-01-30 PROCEDURE — 7100000002 HC RECOVERY ROOM TIME - EACH INCREMENTAL 1 MINUTE: Performed by: NEUROLOGICAL SURGERY

## 2025-01-30 PROCEDURE — 2500000004 HC RX 250 GENERAL PHARMACY W/ HCPCS (ALT 636 FOR OP/ED): Mod: SE | Performed by: NEUROLOGICAL SURGERY

## 2025-01-30 PROCEDURE — A62230 PR REPLACEMENT/REVISION,CSF SHUNT

## 2025-01-30 PROCEDURE — 3600000009 HC OR TIME - EACH INCREMENTAL 1 MINUTE - PROCEDURE LEVEL FOUR: Performed by: NEUROLOGICAL SURGERY

## 2025-01-30 PROCEDURE — 2780000003 HC OR 278 NO HCPCS: Performed by: NEUROLOGICAL SURGERY

## 2025-01-30 PROCEDURE — C1729 CATH, DRAINAGE: HCPCS | Performed by: NEUROLOGICAL SURGERY

## 2025-01-30 PROCEDURE — 2500000004 HC RX 250 GENERAL PHARMACY W/ HCPCS (ALT 636 FOR OP/ED): Mod: SE | Performed by: ANESTHESIOLOGY

## 2025-01-30 PROCEDURE — 3700000002 HC GENERAL ANESTHESIA TIME - EACH INCREMENTAL 1 MINUTE: Performed by: NEUROLOGICAL SURGERY

## 2025-01-30 PROCEDURE — 62230 REPLACE/REVISE BRAIN SHUNT: CPT | Performed by: NEUROLOGICAL SURGERY

## 2025-01-30 PROCEDURE — 7100000001 HC RECOVERY ROOM TIME - INITIAL BASE CHARGE: Performed by: NEUROLOGICAL SURGERY

## 2025-01-30 PROCEDURE — 82947 ASSAY GLUCOSE BLOOD QUANT: CPT

## 2025-01-30 PROCEDURE — C1894 INTRO/SHEATH, NON-LASER: HCPCS | Performed by: NEUROLOGICAL SURGERY

## 2025-01-30 PROCEDURE — 3600000004 HC OR TIME - INITIAL BASE CHARGE - PROCEDURE LEVEL FOUR: Performed by: NEUROLOGICAL SURGERY

## 2025-01-30 PROCEDURE — 2500000005 HC RX 250 GENERAL PHARMACY W/O HCPCS: Mod: SE

## 2025-01-30 DEVICE — VALVE, CERTAS PLUS, PROGRAMMABLE: Type: IMPLANTABLE DEVICE | Site: CRANIAL | Status: FUNCTIONAL

## 2025-01-30 DEVICE — IMPLANTABLE DEVICE: Type: IMPLANTABLE DEVICE | Site: CRANIAL | Status: FUNCTIONAL

## 2025-01-30 RX ORDER — MORPHINE SULFATE 4 MG/ML
2 INJECTION INTRAVENOUS EVERY 2 HOUR PRN
Status: DISCONTINUED | OUTPATIENT
Start: 2025-01-30 | End: 2025-01-31 | Stop reason: HOSPADM

## 2025-01-30 RX ORDER — HYDROMORPHONE HYDROCHLORIDE 0.2 MG/ML
0.2 INJECTION INTRAMUSCULAR; INTRAVENOUS; SUBCUTANEOUS EVERY 5 MIN PRN
Status: DISCONTINUED | OUTPATIENT
Start: 2025-01-30 | End: 2025-01-30 | Stop reason: HOSPADM

## 2025-01-30 RX ORDER — MIDAZOLAM HYDROCHLORIDE 1 MG/ML
INJECTION INTRAMUSCULAR; INTRAVENOUS CONTINUOUS PRN
Status: DISCONTINUED | OUTPATIENT
Start: 2025-01-30 | End: 2025-01-30

## 2025-01-30 RX ORDER — ONDANSETRON HYDROCHLORIDE 2 MG/ML
4 INJECTION, SOLUTION INTRAVENOUS EVERY 6 HOURS
Status: DISCONTINUED | OUTPATIENT
Start: 2025-01-30 | End: 2025-01-30

## 2025-01-30 RX ORDER — HYDRALAZINE HYDROCHLORIDE 10 MG/1
10 TABLET, FILM COATED ORAL EVERY 4 HOURS PRN
Status: DISCONTINUED | OUTPATIENT
Start: 2025-01-30 | End: 2025-01-30

## 2025-01-30 RX ORDER — ROCURONIUM BROMIDE 10 MG/ML
INJECTION, SOLUTION INTRAVENOUS AS NEEDED
Status: DISCONTINUED | OUTPATIENT
Start: 2025-01-30 | End: 2025-01-30

## 2025-01-30 RX ORDER — HYDRALAZINE HYDROCHLORIDE 20 MG/ML
10 INJECTION INTRAMUSCULAR; INTRAVENOUS
Status: DISCONTINUED | OUTPATIENT
Start: 2025-01-30 | End: 2025-01-30

## 2025-01-30 RX ORDER — PROMETHAZINE HYDROCHLORIDE 25 MG/1
12.5 TABLET ORAL EVERY 4 HOURS PRN
Status: DISCONTINUED | OUTPATIENT
Start: 2025-01-30 | End: 2025-01-30

## 2025-01-30 RX ORDER — LIDOCAINE HYDROCHLORIDE 10 MG/ML
0.1 INJECTION, SOLUTION INFILTRATION; PERINEURAL ONCE
Status: DISCONTINUED | OUTPATIENT
Start: 2025-01-30 | End: 2025-01-30 | Stop reason: HOSPADM

## 2025-01-30 RX ORDER — ESMOLOL HYDROCHLORIDE 10 MG/ML
INJECTION INTRAVENOUS AS NEEDED
Status: DISCONTINUED | OUTPATIENT
Start: 2025-01-30 | End: 2025-01-30

## 2025-01-30 RX ORDER — ACETAMINOPHEN 10 MG/ML
INJECTION, SOLUTION INTRAVENOUS AS NEEDED
Status: DISCONTINUED | OUTPATIENT
Start: 2025-01-30 | End: 2025-01-30

## 2025-01-30 RX ORDER — CEFAZOLIN 1 G/1
INJECTION, POWDER, FOR SOLUTION INTRAVENOUS AS NEEDED
Status: DISCONTINUED | OUTPATIENT
Start: 2025-01-30 | End: 2025-01-30

## 2025-01-30 RX ORDER — ONDANSETRON HYDROCHLORIDE 2 MG/ML
4 INJECTION, SOLUTION INTRAVENOUS EVERY 6 HOURS PRN
Status: DISCONTINUED | OUTPATIENT
Start: 2025-01-30 | End: 2025-01-31 | Stop reason: HOSPADM

## 2025-01-30 RX ORDER — LEVETIRACETAM 500 MG/1
1500 TABLET ORAL ONCE
Status: COMPLETED | OUTPATIENT
Start: 2025-01-30 | End: 2025-01-30

## 2025-01-30 RX ORDER — ONDANSETRON HYDROCHLORIDE 2 MG/ML
4 INJECTION, SOLUTION INTRAVENOUS EVERY 8 HOURS PRN
Status: DISCONTINUED | OUTPATIENT
Start: 2025-01-30 | End: 2025-01-30

## 2025-01-30 RX ORDER — NITROGLYCERIN 40 MG/100ML
INJECTION INTRAVENOUS AS NEEDED
Status: DISCONTINUED | OUTPATIENT
Start: 2025-01-30 | End: 2025-01-30

## 2025-01-30 RX ORDER — PROPOFOL 10 MG/ML
INJECTION, EMULSION INTRAVENOUS AS NEEDED
Status: DISCONTINUED | OUTPATIENT
Start: 2025-01-30 | End: 2025-01-30

## 2025-01-30 RX ORDER — SODIUM CHLORIDE, SODIUM LACTATE, POTASSIUM CHLORIDE, CALCIUM CHLORIDE 600; 310; 30; 20 MG/100ML; MG/100ML; MG/100ML; MG/100ML
INJECTION, SOLUTION INTRAVENOUS CONTINUOUS PRN
Status: DISCONTINUED | OUTPATIENT
Start: 2025-01-30 | End: 2025-01-30

## 2025-01-30 RX ORDER — FENTANYL CITRATE 50 UG/ML
INJECTION, SOLUTION INTRAMUSCULAR; INTRAVENOUS AS NEEDED
Status: DISCONTINUED | OUTPATIENT
Start: 2025-01-30 | End: 2025-01-30

## 2025-01-30 RX ORDER — ONDANSETRON 4 MG/1
4 TABLET, FILM COATED ORAL EVERY 6 HOURS
Status: DISCONTINUED | OUTPATIENT
Start: 2025-01-30 | End: 2025-01-30

## 2025-01-30 RX ORDER — LABETALOL HYDROCHLORIDE 5 MG/ML
10 INJECTION, SOLUTION INTRAVENOUS EVERY 10 MIN PRN
Status: DISCONTINUED | OUTPATIENT
Start: 2025-01-30 | End: 2025-01-31 | Stop reason: HOSPADM

## 2025-01-30 RX ORDER — LIDOCAINE HYDROCHLORIDE 20 MG/ML
INJECTION, SOLUTION INFILTRATION; PERINEURAL AS NEEDED
Status: DISCONTINUED | OUTPATIENT
Start: 2025-01-30 | End: 2025-01-30

## 2025-01-30 RX ORDER — POLYETHYLENE GLYCOL 3350 17 G/17G
17 POWDER, FOR SOLUTION ORAL 2 TIMES DAILY
Status: DISCONTINUED | OUTPATIENT
Start: 2025-01-30 | End: 2025-01-31 | Stop reason: HOSPADM

## 2025-01-30 RX ORDER — ONDANSETRON 4 MG/1
4 TABLET, FILM COATED ORAL EVERY 6 HOURS PRN
Status: DISCONTINUED | OUTPATIENT
Start: 2025-01-30 | End: 2025-01-31 | Stop reason: HOSPADM

## 2025-01-30 RX ORDER — HYDROMORPHONE HYDROCHLORIDE 1 MG/ML
INJECTION, SOLUTION INTRAMUSCULAR; INTRAVENOUS; SUBCUTANEOUS AS NEEDED
Status: DISCONTINUED | OUTPATIENT
Start: 2025-01-30 | End: 2025-01-30

## 2025-01-30 RX ORDER — ONDANSETRON HYDROCHLORIDE 2 MG/ML
4 INJECTION, SOLUTION INTRAVENOUS ONCE AS NEEDED
Status: DISCONTINUED | OUTPATIENT
Start: 2025-01-30 | End: 2025-01-30 | Stop reason: HOSPADM

## 2025-01-30 RX ORDER — BACITRACIN 500 [USP'U]/G
OINTMENT TOPICAL AS NEEDED
Status: DISCONTINUED | OUTPATIENT
Start: 2025-01-30 | End: 2025-01-30 | Stop reason: HOSPADM

## 2025-01-30 RX ORDER — HEPARIN SODIUM 5000 [USP'U]/ML
7500 INJECTION, SOLUTION INTRAVENOUS; SUBCUTANEOUS EVERY 8 HOURS SCHEDULED
Status: DISCONTINUED | OUTPATIENT
Start: 2025-01-31 | End: 2025-01-31 | Stop reason: HOSPADM

## 2025-01-30 RX ORDER — INSULIN LISPRO 100 [IU]/ML
0-5 INJECTION, SOLUTION INTRAVENOUS; SUBCUTANEOUS
Status: DISCONTINUED | OUTPATIENT
Start: 2025-01-30 | End: 2025-01-31 | Stop reason: HOSPADM

## 2025-01-30 RX ORDER — SODIUM CHLORIDE 0.9 G/100ML
INJECTION, SOLUTION IRRIGATION AS NEEDED
Status: DISCONTINUED | OUTPATIENT
Start: 2025-01-30 | End: 2025-01-30 | Stop reason: HOSPADM

## 2025-01-30 RX ORDER — NORETHINDRONE AND ETHINYL ESTRADIOL 0.5-0.035
KIT ORAL AS NEEDED
Status: DISCONTINUED | OUTPATIENT
Start: 2025-01-30 | End: 2025-01-30

## 2025-01-30 RX ORDER — NALOXONE HYDROCHLORIDE 0.4 MG/ML
0.2 INJECTION, SOLUTION INTRAMUSCULAR; INTRAVENOUS; SUBCUTANEOUS EVERY 5 MIN PRN
Status: DISCONTINUED | OUTPATIENT
Start: 2025-01-30 | End: 2025-01-31 | Stop reason: HOSPADM

## 2025-01-30 RX ORDER — ONDANSETRON 4 MG/1
4 TABLET, FILM COATED ORAL EVERY 8 HOURS PRN
Qty: 20 TABLET | Refills: 0 | Status: CANCELLED | OUTPATIENT
Start: 2025-01-30 | End: 2025-02-06

## 2025-01-30 RX ORDER — FENTANYL CITRATE 50 UG/ML
50 INJECTION, SOLUTION INTRAMUSCULAR; INTRAVENOUS EVERY 5 MIN PRN
Status: DISCONTINUED | OUTPATIENT
Start: 2025-01-30 | End: 2025-01-30 | Stop reason: HOSPADM

## 2025-01-30 RX ORDER — SODIUM CHLORIDE 9 MG/ML
75 INJECTION, SOLUTION INTRAVENOUS CONTINUOUS
Status: ACTIVE | OUTPATIENT
Start: 2025-01-30 | End: 2025-01-31

## 2025-01-30 RX ORDER — OXYCODONE HYDROCHLORIDE 5 MG/1
2.5 TABLET ORAL EVERY 4 HOURS PRN
Status: DISCONTINUED | OUTPATIENT
Start: 2025-01-30 | End: 2025-01-31 | Stop reason: HOSPADM

## 2025-01-30 RX ORDER — OXYCODONE HYDROCHLORIDE 5 MG/1
5 TABLET ORAL EVERY 6 HOURS PRN
Qty: 21 TABLET | Refills: 0 | Status: CANCELLED | OUTPATIENT
Start: 2025-01-30 | End: 2025-02-04

## 2025-01-30 RX ORDER — OXYCODONE HYDROCHLORIDE 10 MG/1
10 TABLET ORAL EVERY 4 HOURS PRN
Status: DISCONTINUED | OUTPATIENT
Start: 2025-01-30 | End: 2025-01-31 | Stop reason: HOSPADM

## 2025-01-30 RX ORDER — LIDOCAINE HYDROCHLORIDE AND EPINEPHRINE 5; 5 MG/ML; UG/ML
INJECTION, SOLUTION INFILTRATION; PERINEURAL AS NEEDED
Status: DISCONTINUED | OUTPATIENT
Start: 2025-01-30 | End: 2025-01-30 | Stop reason: HOSPADM

## 2025-01-30 RX ORDER — PHENYLEPHRINE HCL IN 0.9% NACL 0.4MG/10ML
SYRINGE (ML) INTRAVENOUS AS NEEDED
Status: DISCONTINUED | OUTPATIENT
Start: 2025-01-30 | End: 2025-01-30

## 2025-01-30 RX ORDER — ONDANSETRON 4 MG/1
4 TABLET, FILM COATED ORAL EVERY 8 HOURS PRN
Status: DISCONTINUED | OUTPATIENT
Start: 2025-01-30 | End: 2025-01-30

## 2025-01-30 RX ORDER — SODIUM CHLORIDE 9 MG/ML
INJECTION, SOLUTION INTRAVENOUS CONTINUOUS PRN
Status: DISCONTINUED | OUTPATIENT
Start: 2025-01-30 | End: 2025-01-30

## 2025-01-30 RX ORDER — OXYCODONE HYDROCHLORIDE 5 MG/1
5 TABLET ORAL EVERY 4 HOURS PRN
Status: DISCONTINUED | OUTPATIENT
Start: 2025-01-30 | End: 2025-01-31 | Stop reason: HOSPADM

## 2025-01-30 RX ORDER — SODIUM CHLORIDE, SODIUM LACTATE, POTASSIUM CHLORIDE, CALCIUM CHLORIDE 600; 310; 30; 20 MG/100ML; MG/100ML; MG/100ML; MG/100ML
50 INJECTION, SOLUTION INTRAVENOUS CONTINUOUS
Status: DISCONTINUED | OUTPATIENT
Start: 2025-01-30 | End: 2025-01-30 | Stop reason: HOSPADM

## 2025-01-30 RX ORDER — LABETALOL HYDROCHLORIDE 5 MG/ML
10 INJECTION, SOLUTION INTRAVENOUS ONCE
Status: DISCONTINUED | OUTPATIENT
Start: 2025-01-30 | End: 2025-01-30 | Stop reason: HOSPADM

## 2025-01-30 RX ADMIN — MONTELUKAST 10 MG: 10 TABLET, FILM COATED ORAL at 20:39

## 2025-01-30 RX ADMIN — PANTOPRAZOLE SODIUM 40 MG: 40 TABLET, DELAYED RELEASE ORAL at 05:19

## 2025-01-30 RX ADMIN — LEVETIRACETAM 1500 MG: 750 TABLET, FILM COATED ORAL at 20:38

## 2025-01-30 RX ADMIN — INSULIN LISPRO 2 UNITS: 100 INJECTION, SOLUTION INTRAVENOUS; SUBCUTANEOUS at 17:10

## 2025-01-30 RX ADMIN — CEFAZOLIN 2 G: 1 INJECTION, POWDER, FOR SOLUTION INTRAMUSCULAR; INTRAVENOUS at 08:16

## 2025-01-30 RX ADMIN — FENTANYL CITRATE 100 MCG: 50 INJECTION, SOLUTION INTRAMUSCULAR; INTRAVENOUS at 07:58

## 2025-01-30 RX ADMIN — TIZANIDINE 2 MG: 4 TABLET ORAL at 20:37

## 2025-01-30 RX ADMIN — Medication 160 MCG: at 08:50

## 2025-01-30 RX ADMIN — Medication 200 MCG: at 09:18

## 2025-01-30 RX ADMIN — LEVETIRACETAM 1500 MG: 500 TABLET, FILM COATED ORAL at 11:56

## 2025-01-30 RX ADMIN — ONDANSETRON 4 MG: 2 INJECTION INTRAMUSCULAR; INTRAVENOUS at 05:10

## 2025-01-30 RX ADMIN — Medication 160 MCG: at 08:35

## 2025-01-30 RX ADMIN — PROPOFOL 80 MG: 10 INJECTION, EMULSION INTRAVENOUS at 07:58

## 2025-01-30 RX ADMIN — ROPINIROLE 0.5 MG: 0.5 TABLET, FILM COATED ORAL at 20:39

## 2025-01-30 RX ADMIN — FUROSEMIDE 20 MG: 20 TABLET ORAL at 20:38

## 2025-01-30 RX ADMIN — AZELASTINE HYDROCHLORIDE 1 SPRAY: 137 SPRAY, METERED NASAL at 20:40

## 2025-01-30 RX ADMIN — OXYCODONE HYDROCHLORIDE 10 MG: 10 TABLET ORAL at 20:49

## 2025-01-30 RX ADMIN — CLOTRIMAZOLE 10 MG: 10 LOZENGE ORAL at 20:39

## 2025-01-30 RX ADMIN — Medication 160 MCG: at 08:40

## 2025-01-30 RX ADMIN — LACOSAMIDE 300 MG: 100 TABLET, FILM COATED ORAL at 20:37

## 2025-01-30 RX ADMIN — ESMOLOL HYDROCHLORIDE 40 MG: 10 INJECTION, SOLUTION INTRAVENOUS at 08:02

## 2025-01-30 RX ADMIN — ONDANSETRON 4 MG: 2 INJECTION INTRAMUSCULAR; INTRAVENOUS at 14:38

## 2025-01-30 RX ADMIN — ROCURONIUM BROMIDE 20 MG: 10 INJECTION INTRAVENOUS at 08:40

## 2025-01-30 RX ADMIN — FENTANYL CITRATE 50 MCG: 50 INJECTION INTRAMUSCULAR; INTRAVENOUS at 10:48

## 2025-01-30 RX ADMIN — AMITRIPTYLINE HYDROCHLORIDE 100 MG: 100 TABLET ORAL at 20:40

## 2025-01-30 RX ADMIN — EPHEDRINE SULFATE 5 MG: 50 INJECTION, SOLUTION INTRAVENOUS at 09:18

## 2025-01-30 RX ADMIN — SENNOSIDES AND DOCUSATE SODIUM 2 TABLET: 50; 8.6 TABLET ORAL at 20:40

## 2025-01-30 RX ADMIN — Medication 160 MCG: at 08:45

## 2025-01-30 RX ADMIN — ONDANSETRON 4 MG: 2 INJECTION INTRAMUSCULAR; INTRAVENOUS at 09:20

## 2025-01-30 RX ADMIN — NITROGLYCERIN 200 MCG: 10 INJECTION INTRAVENOUS at 10:14

## 2025-01-30 RX ADMIN — PROPOFOL 50 MG: 10 INJECTION, EMULSION INTRAVENOUS at 08:04

## 2025-01-30 RX ADMIN — NITROGLYCERIN 100 MCG: 10 INJECTION INTRAVENOUS at 10:09

## 2025-01-30 RX ADMIN — DIVALPROEX SODIUM 500 MG: 500 TABLET, FILM COATED, EXTENDED RELEASE ORAL at 20:40

## 2025-01-30 RX ADMIN — ROCURONIUM BROMIDE 50 MG: 10 INJECTION INTRAVENOUS at 07:59

## 2025-01-30 RX ADMIN — ESMOLOL HYDROCHLORIDE 40 MG: 10 INJECTION, SOLUTION INTRAVENOUS at 09:49

## 2025-01-30 RX ADMIN — Medication 160 MCG: at 09:02

## 2025-01-30 RX ADMIN — SODIUM CHLORIDE, POTASSIUM CHLORIDE, SODIUM LACTATE AND CALCIUM CHLORIDE: 600; 310; 30; 20 INJECTION, SOLUTION INTRAVENOUS at 07:58

## 2025-01-30 RX ADMIN — Medication 300 MCG: at 09:11

## 2025-01-30 RX ADMIN — POLYETHYLENE GLYCOL 3350 17 G: 17 POWDER, FOR SOLUTION ORAL at 20:40

## 2025-01-30 RX ADMIN — CLOTRIMAZOLE 10 MG: 10 LOZENGE ORAL at 14:41

## 2025-01-30 RX ADMIN — PROPOFOL 70 MG: 10 INJECTION, EMULSION INTRAVENOUS at 08:01

## 2025-01-30 RX ADMIN — EPHEDRINE SULFATE 5 MG: 50 INJECTION, SOLUTION INTRAVENOUS at 09:02

## 2025-01-30 RX ADMIN — SODIUM CHLORIDE: 0.9 INJECTION, SOLUTION INTRAVENOUS at 08:30

## 2025-01-30 RX ADMIN — HYDROMORPHONE HYDROCHLORIDE 0.6 MG: 1 INJECTION, SOLUTION INTRAMUSCULAR; INTRAVENOUS; SUBCUTANEOUS at 10:28

## 2025-01-30 RX ADMIN — HYDROMORPHONE HYDROCHLORIDE 0.2 MG: 1 INJECTION, SOLUTION INTRAMUSCULAR; INTRAVENOUS; SUBCUTANEOUS at 10:21

## 2025-01-30 RX ADMIN — LIDOCAINE HYDROCHLORIDE 100 MG: 20 INJECTION, SOLUTION INFILTRATION; PERINEURAL at 07:58

## 2025-01-30 RX ADMIN — OXYCODONE HYDROCHLORIDE 10 MG: 10 TABLET ORAL at 15:56

## 2025-01-30 RX ADMIN — PANTOPRAZOLE SODIUM 40 MG: 40 TABLET, DELAYED RELEASE ORAL at 17:10

## 2025-01-30 RX ADMIN — CLONAZEPAM 0.5 MG: 0.5 TABLET ORAL at 20:37

## 2025-01-30 RX ADMIN — EPHEDRINE SULFATE 10 MG: 50 INJECTION, SOLUTION INTRAVENOUS at 09:08

## 2025-01-30 RX ADMIN — LEVOTHYROXINE SODIUM 75 MCG: 0.05 TABLET ORAL at 05:19

## 2025-01-30 RX ADMIN — ACETAMINOPHEN 1000 MG: 10 INJECTION, SOLUTION INTRAVENOUS at 09:49

## 2025-01-30 RX ADMIN — MORPHINE SULFATE 2 MG: 4 INJECTION INTRAVENOUS at 18:57

## 2025-01-30 RX ADMIN — Medication 80 MCG: at 08:18

## 2025-01-30 RX ADMIN — Medication 200 MCG: at 09:21

## 2025-01-30 RX ADMIN — SUGAMMADEX 400 MG: 100 INJECTION, SOLUTION INTRAVENOUS at 10:13

## 2025-01-30 RX ADMIN — ONDANSETRON 4 MG: 2 INJECTION INTRAMUSCULAR; INTRAVENOUS at 12:56

## 2025-01-30 RX ADMIN — PROPOFOL 50 MG: 10 INJECTION, EMULSION INTRAVENOUS at 09:54

## 2025-01-30 RX ADMIN — Medication 120 MCG: at 08:25

## 2025-01-30 ASSESSMENT — PAIN SCALES - GENERAL
PAINLEVEL_OUTOF10: 4
PAINLEVEL_OUTOF10: 8
PAINLEVEL_OUTOF10: 8
PAINLEVEL_OUTOF10: 6
PAINLEVEL_OUTOF10: 0 - NO PAIN
PAINLEVEL_OUTOF10: 8
PAINLEVEL_OUTOF10: 7
PAINLEVEL_OUTOF10: 8
PAINLEVEL_OUTOF10: 6
PAIN_LEVEL: 3
PAINLEVEL_OUTOF10: 7
PAINLEVEL_OUTOF10: 6
PAINLEVEL_OUTOF10: 6

## 2025-01-30 ASSESSMENT — PAIN DESCRIPTION - DESCRIPTORS: DESCRIPTORS: ACHING

## 2025-01-30 ASSESSMENT — PAIN SCALES - PAIN ASSESSMENT IN ADVANCED DEMENTIA (PAINAD): TOTALSCORE: MEDICATION (SEE MAR)

## 2025-01-30 NOTE — OP NOTE
Exploration and revision of ventriculoperitoneal shunt (R) Operative Note     Date: 2025 - 2025  OR Location: [facility] OR    Name: Jonathan Ayala, : 1975, Age: 49 y.o., MRN: [id number], Sex: female    Diagnosis  Pre-op Diagnosis      * H/O  shunt revision [Z98.890] Post-op Diagnosis     * H/O  shunt revision [Z98.890]     Procedures  Exploration and revision of ventriculoperitoneal shunt; revision and washout of cranial wound  55689 - [place] RPLCMT/REVJ CSF SHUNT VALVE/CATH SHUNT SYS    Exploration and revision of ventriculoperitoneal shunt; revision and washout of cranial wound  45428 - [place] RPLCMT/REVJ CSF SHUNT VALVE/CATH SHUNT SYS      Surgeons      * Avelino Tafoya - Primary    Resident/Fellow/Other Assistant:  Surgeons and Role:     * Alan Osei MD - Resident - Assisting    Staff:   Circulator: Licha Guadalupe Person: Emmanuel    Anesthesia Staff: Anesthesiologist: Joanna Mariscal MD  CRNA: RAUL Noyola-CRNA  SRNA: Amanda Sanchez    Procedure Summary  Anesthesia: General  ASA: III  Estimated Blood Loss: 50 mL  Intra-op Medications:   Administrations occurring from 0700 to 1010 on 25:   Medication Name Total Dose   lidocaine-epinephrine (Xylocaine W/EPI) 0.5 %-1:200,000 injection 10 mL   bacitracin ointment 1 Application   sodium chloride 0.9 % irrigation solution 1,000 mL   acetaminophen (Ofirmev) injection 1,000 mg   azelastine (Astelin) 137 mcg (0.1 %) nasal spray 1 spray Cannot be calculated   bisacodyl (Dulcolax) suppository 10 mg Cannot be calculated   calcium carbonate-vitamin D3 500 mg-5 mcg (200 unit) per tablet 1 tablet Cannot be calculated   ceFAZolin (Ancef) vial 1 g 2 g   cholecalciferol (Vitamin D-3) tablet 5,000 Units Cannot be calculated   clonazePAM (KlonoPIN) tablet 0.5 mg Cannot be calculated   clotrimazole (Mycelex) polly 10 mg Cannot be calculated   divalproex (Depakote ER) 24 hr tablet 500 mg Cannot be calculated   ePHEDrine injection 20 mg   esmolol 10  mg/mL 80 mg   ezetimibe (Zetia) tablet 10 mg Cannot be calculated   fentaNYL (Sublimaze) injection 50 mcg/mL 100 mcg   fluticasone (Flonase) nasal spray 1 spray Cannot be calculated   fluticasone furoate-vilanteroL (Breo Ellipta) 200-25 mcg/dose inhaler 1 puff Cannot be calculated   folic acid (Folvite) tablet 1 mg Cannot be calculated   furosemide (Lasix) tablet 20 mg Cannot be calculated   hydrocortisone (Cortef) tablet 10 mg Cannot be calculated   insulin lispro injection 0-5 Units Cannot be calculated   lacosamide (Vimpat) tablet 300 mg Cannot be calculated   lactated Ringer's infusion Cannot be calculated   levETIRAcetam (Keppra) tablet 1,500 mg Cannot be calculated   lidocaine (Xylocaine) injection 2 % 100 mg   neomycin-bacitracnZn-polymyxnB (Neosporin Original) ointment Cannot be calculated   nitroglycerin 1 mg in 10 mL D5W IV bolus 100 mcg   phenylephrine 40 mcg/mL syringe 10 mL 1,700 mcg   polyethylene glycol (Glycolax, Miralax) packet 17 g Cannot be calculated   propofol (Diprivan) injection 10 mg/mL 200 mg   propranolol LA (Inderal LA) 24 hr capsule 60 mg Cannot be calculated   rocuronium (ZeMuron) 50 mg/5 mL injection 70 mg   rOPINIRole (Requip) tablet 0.5 mg Cannot be calculated   sennosides-docusate sodium (Georgina-Colace) 8.6-50 mg per tablet 2 tablet Cannot be calculated   tiZANidine (Zanaflex) tablet 2 mg Cannot be calculated              Anesthesia Record               Intraprocedure I/O Totals       None           Specimen: No specimens collected        Drains and/or Catheters: * None in log *          Implants:  Implants       Type Name Action Serial No.      Neuro Interventional Implant VALVE, CERTAS PLUS, PROGRAMMABLE - [id number] Implanted      Catheter PERITONEAL CATHETER Implanted               Findings: sluggish flow through valve concerning for valve failure, good distal runoff and proximal flow    Indications: Jonathan Ayala is an 49 y.o. female who is having surgery for H/O  shunt revision  [Z98.890].     The patient was seen in the preoperative area. The risks, benefits, complications, treatment options, non-operative alternatives, expected recovery and outcomes were discussed with the patient. The possibilities of reaction to medication, pulmonary aspiration, injury to surrounding structures, bleeding, recurrent infection, the need for additional procedures, failure to diagnose a condition, and creating a complication requiring transfusion or operation were discussed with the patient. The patient concurred with the proposed plan, giving informed consent.  The site of surgery was properly noted/marked if necessary per policy. The patient has been actively warmed in preoperative area. Preoperative antibiotics have been ordered and given within 1 hours of incision. Venous thrombosis prophylaxis have been ordered including bilateral sequential compression devices    Procedure Details: The patient was brought back from preop to OR. A safety huddle was performed and anesthesia was induced. The right side of the head and abdomen were cleansed, prepped and draped in usual sterile fashion. Local anesthetic was used to infiltrate skin over incision.     The prior C-shaped incision on the right side of the head was opened with a 10 blade and cautery to expose the rickham, valve, and distal tubing. First, the valve was disconnected from the rickham where brisk proximal flow was noted. With the rickham clamped off temporarily, a mannometer was connected to the valve where sluggish flow that stopped at 25 mmH20 was noted. The distal tubing was then tested in isolation where distal runoff was normal, with a slowing at around 10 mmH20 which was considered expected given her intraabdominal pressure. A new certas with siphon guard (set at 4) was connected to the rickham and distal tubing and was secured into place with 2-0 silk ties. The incision was then irrigated copiously with antibiotic impregnated normal saline.  A small area of stitch granuloma was debrided until healthy tissue was seen. The incision was then closed in a layered fashion with 2-0 vicryls for the galea and vertical mattress 3-0 nylons for the skin. All counts were correct at end of case and patient was turned over to anesthesia for extubation.    Complications:  None; patient tolerated the procedure well.    Disposition: PACU - hemodynamically stable.  Condition: stable     Additional Details: none    Attending Attestation:     Avelino Tafoya  Phone Number: [phone]

## 2025-01-30 NOTE — PROGRESS NOTES
"Jonathan Ayala is a 49 y.o. female on day 1 of admission presenting with IIH (idiopathic intracranial hypertension).    Subjective   No acute events    Objective     Physical Exam  A&Ox3  Face symmetric  RUE 5/5  LUE 5/5  RLE 5/5  LLE 5/5  Sensation intact to light touch throughout all extremities  Counts fingers  Incision with scabbing at anterior portion, reported leakage    Last Recorded Vitals  Blood pressure 106/70, pulse 86, temperature 36 °C (96.8 °F), resp. rate 20, height 1.575 m (5' 2\"), weight 115 kg (253 lb), SpO2 98%.  Intake/Output last 3 Shifts:  I/O last 3 completed shifts:  In: 440 (3.8 mL/kg) [P.O.:440]  Out: - (0 mL/kg)   Weight: 114.8 kg     Relevant Results         Assessment/Plan   Assessment & Plan  IIH (idiopathic intracranial hypertension)    H/O  shunt revision    Nonintractable headache, unspecified chronicity pattern, unspecified headache type    h/o IIH (dx 2/2022 w/ OP 25) s/p R frontal bolt c/b tract hemorrhage and focal epilepsy, right hemiplegic migraines, CVID on monthly IVIG infusions, Sjogren's syndrome/scleroderma, POTS, T2DM, HTN, hypothyroidism, BRENT on CPAP, anxiety/depression, 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 10/30 s/p R VPS exploration w abdominal catheter repositioning w improvement in distal runnoff, 11/6 p/w min clear incisional drainage, min decr in vision, incision oversewn with no further leakage noted, 11/15 p/w fluid leaking from incision site, HA 11/15 s/p RF shunt wound revision and fractured valve replacement, 12/9 p/w wound dehiscence/incisional leakage, 12/10 s/p R cranial wound exploration, washout, revision, 1/8 p/w double vision, nausea and vomiting, and 2 seizure events, s/p LP (OP 27), certas dialed to 4, p/w drainage from shunt site, cough, temporal vision changes, CTH stable, SS certas 4, 1/28 s/p LP (OP31)    PLAN  Obs  OR Thurs 1/30 for shunt exploration/revision  Ophtho recs  CSF " Cx  PTOT  SCDs    I have referenced the HP dated 1/28 and any changes are noted in progress note.        Jesus Lambert MD

## 2025-01-30 NOTE — ANESTHESIA PROCEDURE NOTES
Airway  Date/Time: 1/30/2025 8:05 AM  Urgency: elective    Airway not difficult    Staffing  Performed: SRNA   Authorized by: Joanna Mariscal MD    Performed by: Amanda Sanchez  Patient location during procedure: OR    Indications and Patient Condition  Indications for airway management: anesthesia  Spontaneous ventilation: present  Sedation level: deep  Preoxygenated: yes  Patient position: sniffing  Mask difficulty assessment: 2 - vent by mask + OA or adjuvant +/- NMBA  Planned trial extubation    Final Airway Details  Final airway type: endotracheal airway      Successful airway: ETT  Cuffed: yes   Successful intubation technique: video laryngoscopy  Facilitating devices/methods: intubating stylet  Endotracheal tube insertion site: oral  Blade: Jeremy  Blade size: #3  ETT size (mm): 7.0  Cormack-Lehane Classification: grade I - full view of glottis  Placement verified by: capnometry   Measured from: teeth  ETT to teeth (cm): 21  Number of attempts at approach: 1

## 2025-01-30 NOTE — SIGNIFICANT EVENT
Called report to T4 RN. Waiting on Keppra from pharmacy then patient will be able to transfer to floor.

## 2025-01-30 NOTE — ANESTHESIA POSTPROCEDURE EVALUATION
Patient: Jonathan Ayala    Procedure Summary       Date: 01/30/25 Room / Location: [facility] OR 24 / [facility] OR    Anesthesia Start: 0741 Anesthesia Stop: 1046    Procedure: Exploration and revision of ventriculoperitoneal shunt; revision and washout of cranial wound (Right: Head) Diagnosis:       H/O  shunt revision      (H/O  shunt revision [Z98.890])    Surgeons: Avelino Tafoya MD Responsible Provider: Joanna Mariscal MD    Anesthesia Type: general ASA Status: 3            Anesthesia Type: general    Vitals Value Taken Time   /97 01/30/25 1101   Temp 36 01/30/25 1116   Pulse 80 01/30/25 1112   Resp 11 01/30/25 1112   SpO2 96 % 01/30/25 1112   Vitals shown include unfiled device data.    Anesthesia Post Evaluation    Patient location during evaluation: PACU  Patient participation: complete - patient participated  Level of consciousness: awake  Pain score: 3  Pain management: adequate  Airway patency: patent  Cardiovascular status: hypertensive  Respiratory status: nasal cannula  Hydration status: acceptable  Postoperative Nausea and Vomiting: none      No notable events documented.

## 2025-01-31 ENCOUNTER — SPECIALTY PHARMACY (OUTPATIENT)
Dept: PHARMACY | Facility: CLINIC | Age: 50
End: 2025-01-31

## 2025-01-31 ENCOUNTER — PHARMACY VISIT (OUTPATIENT)
Dept: PHARMACY | Facility: CLINIC | Age: 50
End: 2025-01-31
Payer: MEDICAID

## 2025-01-31 VITALS
BODY MASS INDEX: 46.56 KG/M2 | WEIGHT: 253 LBS | OXYGEN SATURATION: 95 % | HEART RATE: 98 BPM | HEIGHT: 62 IN | SYSTOLIC BLOOD PRESSURE: 118 MMHG | RESPIRATION RATE: 16 BRPM | TEMPERATURE: 97 F | DIASTOLIC BLOOD PRESSURE: 77 MMHG

## 2025-01-31 LAB
ALBUMIN SERPL BCP-MCNC: 3.8 G/DL (ref 3.4–5)
ALBUMIN SERPL BCP-MCNC: 4 G/DL (ref 3.4–5)
ANION GAP SERPL CALC-SCNC: 12 MMOL/L (ref 10–20)
ANION GAP SERPL CALC-SCNC: 12 MMOL/L (ref 10–20)
BUN SERPL-MCNC: 13 MG/DL (ref 6–23)
BUN SERPL-MCNC: 14 MG/DL (ref 6–23)
CALCIUM SERPL-MCNC: 8.9 MG/DL (ref 8.6–10.6)
CALCIUM SERPL-MCNC: 8.9 MG/DL (ref 8.6–10.6)
CHLORIDE SERPL-SCNC: 102 MMOL/L (ref 98–107)
CHLORIDE SERPL-SCNC: 102 MMOL/L (ref 98–107)
CO2 SERPL-SCNC: 27 MMOL/L (ref 21–32)
CO2 SERPL-SCNC: 30 MMOL/L (ref 21–32)
CREAT SERPL-MCNC: 1.19 MG/DL (ref 0.5–1.05)
CREAT SERPL-MCNC: 1.22 MG/DL (ref 0.5–1.05)
EGFRCR SERPLBLD CKD-EPI 2021: 55 ML/MIN/1.73M*2
EGFRCR SERPLBLD CKD-EPI 2021: 57 ML/MIN/1.73M*2
ERYTHROCYTE [DISTWIDTH] IN BLOOD BY AUTOMATED COUNT: 15.9 % (ref 11.5–14.5)
GLUCOSE BLD MANUAL STRIP-MCNC: 187 MG/DL (ref 74–99)
GLUCOSE BLD MANUAL STRIP-MCNC: 204 MG/DL (ref 74–99)
GLUCOSE BLD MANUAL STRIP-MCNC: 212 MG/DL (ref 74–99)
GLUCOSE BLD MANUAL STRIP-MCNC: 229 MG/DL (ref 74–99)
GLUCOSE SERPL-MCNC: 198 MG/DL (ref 74–99)
GLUCOSE SERPL-MCNC: 204 MG/DL (ref 74–99)
HCT VFR BLD AUTO: 34 % (ref 36–46)
HGB BLD-MCNC: 10.1 G/DL (ref 12–16)
MCH RBC QN AUTO: 26.8 PG (ref 26–34)
MCHC RBC AUTO-ENTMCNC: 29.7 G/DL (ref 32–36)
MCV RBC AUTO: 90 FL (ref 80–100)
NRBC BLD-RTO: 0 /100 WBCS (ref 0–0)
PHOSPHATE SERPL-MCNC: 3.2 MG/DL (ref 2.5–4.9)
PHOSPHATE SERPL-MCNC: 4.7 MG/DL (ref 2.5–4.9)
PLATELET # BLD AUTO: 225 X10*3/UL (ref 150–450)
POTASSIUM SERPL-SCNC: 4.3 MMOL/L (ref 3.5–5.3)
POTASSIUM SERPL-SCNC: 4.3 MMOL/L (ref 3.5–5.3)
RBC # BLD AUTO: 3.77 X10*6/UL (ref 4–5.2)
SODIUM SERPL-SCNC: 137 MMOL/L (ref 136–145)
SODIUM SERPL-SCNC: 140 MMOL/L (ref 136–145)
WBC # BLD AUTO: 5.1 X10*3/UL (ref 4.4–11.3)

## 2025-01-31 PROCEDURE — 85027 COMPLETE CBC AUTOMATED: CPT | Performed by: NEUROLOGICAL SURGERY

## 2025-01-31 PROCEDURE — 2500000002 HC RX 250 W HCPCS SELF ADMINISTERED DRUGS (ALT 637 FOR MEDICARE OP, ALT 636 FOR OP/ED): Mod: SE | Performed by: NEUROLOGICAL SURGERY

## 2025-01-31 PROCEDURE — 2500000001 HC RX 250 WO HCPCS SELF ADMINISTERED DRUGS (ALT 637 FOR MEDICARE OP): Mod: SE | Performed by: NEUROLOGICAL SURGERY

## 2025-01-31 PROCEDURE — 97535 SELF CARE MNGMENT TRAINING: CPT | Mod: GO

## 2025-01-31 PROCEDURE — 2500000005 HC RX 250 GENERAL PHARMACY W/O HCPCS: Mod: SE | Performed by: NEUROLOGICAL SURGERY

## 2025-01-31 PROCEDURE — 2500000004 HC RX 250 GENERAL PHARMACY W/ HCPCS (ALT 636 FOR OP/ED): Mod: SE

## 2025-01-31 PROCEDURE — 80069 RENAL FUNCTION PANEL: CPT | Performed by: PHYSICIAN ASSISTANT

## 2025-01-31 PROCEDURE — 97530 THERAPEUTIC ACTIVITIES: CPT | Mod: GP

## 2025-01-31 PROCEDURE — RXMED WILLOW AMBULATORY MEDICATION CHARGE

## 2025-01-31 PROCEDURE — 2500000004 HC RX 250 GENERAL PHARMACY W/ HCPCS (ALT 636 FOR OP/ED): Mod: SE | Performed by: PHYSICIAN ASSISTANT

## 2025-01-31 PROCEDURE — 97161 PT EVAL LOW COMPLEX 20 MIN: CPT | Mod: GP

## 2025-01-31 PROCEDURE — 82947 ASSAY GLUCOSE BLOOD QUANT: CPT

## 2025-01-31 PROCEDURE — 97530 THERAPEUTIC ACTIVITIES: CPT | Mod: GO

## 2025-01-31 PROCEDURE — 2500000004 HC RX 250 GENERAL PHARMACY W/ HCPCS (ALT 636 FOR OP/ED): Mod: SE | Performed by: NEUROLOGICAL SURGERY

## 2025-01-31 PROCEDURE — 97165 OT EVAL LOW COMPLEX 30 MIN: CPT | Mod: GO

## 2025-01-31 PROCEDURE — 80069 RENAL FUNCTION PANEL: CPT | Performed by: NEUROLOGICAL SURGERY

## 2025-01-31 RX ORDER — OXYCODONE HYDROCHLORIDE 5 MG/1
5 TABLET ORAL EVERY 6 HOURS PRN
Qty: 21 TABLET | Refills: 0 | Status: SHIPPED | OUTPATIENT
Start: 2025-01-31 | End: 2025-02-06

## 2025-01-31 RX ORDER — AMOXICILLIN 250 MG
2 CAPSULE ORAL 2 TIMES DAILY
Qty: 20 TABLET | Refills: 0 | Status: SHIPPED | OUTPATIENT
Start: 2025-01-31 | End: 2025-02-05

## 2025-01-31 RX ORDER — HEPARIN 100 UNIT/ML
5 SYRINGE INTRAVENOUS ONCE
Qty: 5 ML | Refills: 0 | Status: SHIPPED | OUTPATIENT
Start: 2025-01-31 | End: 2025-01-31

## 2025-01-31 RX ORDER — HEPARIN 100 UNIT/ML
5 SYRINGE INTRAVENOUS ONCE
Status: COMPLETED | OUTPATIENT
Start: 2025-01-31 | End: 2025-01-31

## 2025-01-31 RX ADMIN — FLUTICASONE PROPIONATE 1 SPRAY: 50 SPRAY, METERED NASAL at 09:25

## 2025-01-31 RX ADMIN — LACOSAMIDE 300 MG: 100 TABLET, FILM COATED ORAL at 09:30

## 2025-01-31 RX ADMIN — FUROSEMIDE 20 MG: 20 TABLET ORAL at 09:26

## 2025-01-31 RX ADMIN — OXYCODONE HYDROCHLORIDE 10 MG: 10 TABLET ORAL at 18:12

## 2025-01-31 RX ADMIN — EZETIMIBE 10 MG: 10 TABLET ORAL at 09:24

## 2025-01-31 RX ADMIN — OXYCODONE HYDROCHLORIDE 10 MG: 10 TABLET ORAL at 09:30

## 2025-01-31 RX ADMIN — INSULIN LISPRO 2 UNITS: 100 INJECTION, SOLUTION INTRAVENOUS; SUBCUTANEOUS at 12:51

## 2025-01-31 RX ADMIN — PANTOPRAZOLE SODIUM 40 MG: 40 TABLET, DELAYED RELEASE ORAL at 15:59

## 2025-01-31 RX ADMIN — CLONAZEPAM 0.5 MG: 0.5 TABLET ORAL at 09:30

## 2025-01-31 RX ADMIN — CLOTRIMAZOLE 10 MG: 10 LOZENGE ORAL at 09:25

## 2025-01-31 RX ADMIN — INSULIN LISPRO 1 UNITS: 100 INJECTION, SOLUTION INTRAVENOUS; SUBCUTANEOUS at 15:57

## 2025-01-31 RX ADMIN — LEVOTHYROXINE SODIUM 75 MCG: 0.05 TABLET ORAL at 05:29

## 2025-01-31 RX ADMIN — LEVETIRACETAM 1500 MG: 750 TABLET, FILM COATED ORAL at 09:23

## 2025-01-31 RX ADMIN — INSULIN LISPRO 2 UNITS: 100 INJECTION, SOLUTION INTRAVENOUS; SUBCUTANEOUS at 09:19

## 2025-01-31 RX ADMIN — PANTOPRAZOLE SODIUM 40 MG: 40 TABLET, DELAYED RELEASE ORAL at 05:29

## 2025-01-31 RX ADMIN — OXYCODONE HYDROCHLORIDE 5 MG: 5 TABLET ORAL at 05:48

## 2025-01-31 RX ADMIN — FOLIC ACID 1 MG: 1 TABLET ORAL at 09:24

## 2025-01-31 RX ADMIN — ROPINIROLE 0.5 MG: 0.5 TABLET, FILM COATED ORAL at 09:24

## 2025-01-31 RX ADMIN — Medication 5000 UNITS: at 09:24

## 2025-01-31 RX ADMIN — CARBOXYMETHYLCELLULOSE SODIUM 2 DROP: 5 SOLUTION/ DROPS OPHTHALMIC at 09:24

## 2025-01-31 RX ADMIN — PROPRANOLOL HYDROCHLORIDE 60 MG: 60 CAPSULE, EXTENDED RELEASE ORAL at 09:25

## 2025-01-31 RX ADMIN — AZELASTINE HYDROCHLORIDE 1 SPRAY: 137 SPRAY, METERED NASAL at 09:26

## 2025-01-31 RX ADMIN — CLOTRIMAZOLE 10 MG: 10 LOZENGE ORAL at 15:56

## 2025-01-31 RX ADMIN — TIZANIDINE 2 MG: 4 TABLET ORAL at 09:24

## 2025-01-31 RX ADMIN — HEPARIN 500 UNITS: 100 SYRINGE at 18:11

## 2025-01-31 RX ADMIN — SODIUM CHLORIDE 1000 ML: 9 INJECTION, SOLUTION INTRAVENOUS at 05:21

## 2025-01-31 RX ADMIN — Medication 1 TABLET: at 09:24

## 2025-01-31 RX ADMIN — HYDROCORTISONE 10 MG: 10 TABLET ORAL at 09:24

## 2025-01-31 RX ADMIN — DIVALPROEX SODIUM 500 MG: 500 TABLET, FILM COATED, EXTENDED RELEASE ORAL at 09:25

## 2025-01-31 ASSESSMENT — ACTIVITIES OF DAILY LIVING (ADL)
HOME_MANAGEMENT_TIME_ENTRY: 15
BATHING_ASSISTANCE: MODERATE
BATHING_ASSISTANCE: MODERATE
ADL_ASSISTANCE: NEEDS ASSISTANCE

## 2025-01-31 ASSESSMENT — PAIN - FUNCTIONAL ASSESSMENT
PAIN_FUNCTIONAL_ASSESSMENT: 0-10
PAIN_FUNCTIONAL_ASSESSMENT: 0-10

## 2025-01-31 ASSESSMENT — COGNITIVE AND FUNCTIONAL STATUS - GENERAL
DRESSING REGULAR LOWER BODY CLOTHING: A LITTLE
DAILY ACTIVITIY SCORE: 24
MOBILITY SCORE: 24
CLIMB 3 TO 5 STEPS WITH RAILING: A LITTLE
EATING MEALS: A LITTLE
MOVING TO AND FROM BED TO CHAIR: A LITTLE
TOILETING: A LITTLE
WALKING IN HOSPITAL ROOM: A LITTLE
MOBILITY SCORE: 20
STANDING UP FROM CHAIR USING ARMS: A LITTLE
DRESSING REGULAR UPPER BODY CLOTHING: A LITTLE
PERSONAL GROOMING: A LITTLE
HELP NEEDED FOR BATHING: A LOT
DAILY ACTIVITIY SCORE: 17

## 2025-01-31 ASSESSMENT — PAIN SCALES - GENERAL
PAINLEVEL_OUTOF10: 3
PAINLEVEL_OUTOF10: 4
PAINLEVEL_OUTOF10: 7
PAINLEVEL_OUTOF10: 7
PAINLEVEL_OUTOF10: 5 - MODERATE PAIN

## 2025-01-31 ASSESSMENT — PAIN DESCRIPTION - LOCATION: LOCATION: HEAD

## 2025-01-31 NOTE — PROGRESS NOTES
Occupational Therapy    Evaluation and Treatment    Patient Name: Jonathan Ayala  MRN: [id number]  Today's Date: 1/31/2025  Room: [address] [place] [address]  Time Calculation  Start Time: 0909  Stop Time: 0954  Time Calculation (min): 45 min    Assessment  IP OT Assessment  OT Assessment: Pt demonstrates ability to participate in ADL/IADL activties but requires SBA-Mod A assistance in activities due to decreased strength, balance, and safety. Pt would continue to benefit from OT services to increase progression towards PLOF in ADL/IADL's.  Prognosis: Good  Barriers to Discharge Home: No anticipated barriers  Evaluation/Treatment Tolerance: Patient tolerated treatment well  Medical Staff Made Aware: Yes  End of Session Communication: Bedside nurse  End of Session Patient Position: Bed, 3 rail up, Alarm off, not on at start of session  Plan:  Inpatient Plan  Treatment Interventions: ADL retraining, Patient/family training, Equipment evaluation/education, Compensatory technique education  OT Frequency: 2 times per week  OT Discharge Recommendations: Low intensity level of continued care  OT Recommended Transfer Status: Stand by assist  OT - OK to Discharge: Yes  OT Assessment  OT Assessment Results: Decreased ADL status, Decreased IADLs  Prognosis: Good  Barriers to Discharge: None  Evaluation/Treatment Tolerance: Patient tolerated treatment well  Medical Staff Made Aware: Yes  Strengths: Attitude of self, Leisure activity, Support of Caregivers, Housing layout  Barriers to Participation: Comorbidities    Subjective   Current Problem:  1. Nonintractable headache, unspecified chronicity pattern, unspecified headache type        2. H/O  shunt revision  Case Request Operating Room: Exploration and revision of ventriculoperitoneal shunt; revision and washout of cranial wound    Case Request Operating Room: Exploration and revision of ventriculoperitoneal shunt; revision and washout of cranial wound    Referral to Physical Therapy     Referral to Occupational Therapy      3. IIH (idiopathic intracranial hypertension)  Lumbar Puncture    Lumbar Puncture      4.  (ventriculoperitoneal) shunt status  sennosides-docusate sodium (Georgina-Colace) 8.6-50 mg tablet    oxyCODONE (Roxicodone) 5 mg immediate release tablet    Referral to Physical Therapy    Referral to Occupational Therapy      5. Acute post-operative pain  sennosides-docusate sodium (Georgina-Colace) 8.6-50 mg tablet    oxyCODONE (Roxicodone) 5 mg immediate release tablet      6. 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 10/30 s/p R VPS exploration w abdominal catheter repositioning w improvement in distal runnoff, 11/6 p/w min clear incisional drainage, min decr in vision, incision oversewn with no further leakage noted, 11/15 p/w fluid leaking from incision site, HA 11/15 s/p RF shunt wound revision and fractured valve replacement, 12/9 p/w wound dehiscence/incisional leakage, 12/10 s/p R cranial wound exploration, washout, revision, 1/8 p/w double vision, nausea and vomiting, and 2 seizure events, s/p LP (OP 27), certas dialed to 4, p/w drainage from shunt site, cough, temporal vision changes, CTH stable, SS certas 4, 1/28 s/p LP (OP31)  Prior to Session Communication: Bedside nurse  Patient Position Received: Bed, 3 rail up, Alarm off, not on at start of session  Family/Caregiver Present: No  General Comment: Pt supine in bed upon arrival. Pt pleasant and  agreeable to OT session   Precautions:  Medical Precautions: Fall precautions  Pain:  Pain Assessment  Pain Assessment: 0-10  0-10 (Numeric) Pain Score: 5 - Moderate pain  Pain Type: Surgical pain  Pain Location: Face  Pain Interventions: Repositioned  Response to Interventions: Resting quietly  Lines/Tubes/Drains:  Implantable Port 01/29/25 Right Chest Single lumen port (Active)   Number of days: 2     Objective   Cognition:  Overall Cognitive Status: Within Functional Limits  Orientation Level: Oriented X4  Insight: Within function limits  Impulsive: Within functional limits  Processing Speed: Within funtional limits           Home Living:  Type of Home: House  Lives With: Spouse  Home Adaptive Equipment: Cane  Home Layout: One level  Bathroom Shower/Tub: Tub/shower unit  Bathroom Toilet: Adaptive toilet seating  Bathroom Equipment: Shower chair with back   Prior Function:  Level of [place]: Independent with homemaking with ambulation, Needs assistance with ADLs  Receives Help From: Family, Home health  ADL Assistance: Needs assistance  Bath: Moderate  Homemaking Assistance: Independent  Ambulatory Assistance: Independent  Vocational: Unemployed  Leisure: Hanging out with family/dogs, listening to TV, and coloring  Hand Dominance: Right  Prior Function Comments: Pt reports 6 falls within the past 6 months  IADL History:  Homemaking Responsibilities: Yes  Meal Prep Responsibility: Primary  Laundry Responsibility: Primary  Cleaning Responsibility: Primary  Bill Paying/Finance Responsibility: Primary  Shopping Responsibility: Primary  Current License: No  Occupation: Unemployed  Leisure and Hobbies: Hanging out with family/dogs, listening to TV, and coloring  ADL:  Eating Assistance: Independent  Grooming Assistance: Stand by  Grooming Deficit: Supervision/safety, Increased time to complete, Verbal cueing  Bathing Assistance: Moderate (Anticipated)  Bathing Deficit: Perineal area, Buttocks, Right upper leg, Left  upper leg, Right lower leg including foot, Left lower leg including foot, Supervision/safety, Increased time to complete   UE Dressing Assistance: Minimal (Anticipated)  UE Dressing Deficit: Pull down in back, Pull around back, Increased time to complete  LE Dressing Assistance: Stand by  LE Dressing Deficit: Supervision/safety, Increased time to complete, Verbal cueing  Toileting Assistance with Device: Stand by  Toileting Deficit: Supervison/safety, Increased time to complete, Verbal cueing  Activity Tolerance:  Endurance: Endurance does not limit participation in activity  Balance:  Dynamic Standing Balance  Dynamic Standing-Balance Support: No upper extremity supported  Dynamic Standing-Level of Assistance: Close supervision  Static Sitting Balance  Static Sitting-Balance Support: Feet supported, Bilateral upper extremity supported  Static Sitting-Level of Assistance: Distant supervision  Static Standing Balance  Static Standing-Balance Support: No upper extremity supported  Static Standing-Level of Assistance: Close supervision  Bed Mobility/Transfers: Bed Mobility/Transfers: Bed Mobility  Bed Mobility: Yes  Bed Mobility 1  Bed Mobility 1: Supine to sitting, Sitting to supine  Level of Assistance 1: Close supervision  Bed Mobility Comments 1: HOB elevated   and    IADL's:   Homemaking Responsibilities: Yes  Meal Prep Responsibility: Primary  Laundry Responsibility: Primary  Cleaning Responsibility: Primary  Bill Paying/Finance Responsibility: Primary  Shopping Responsibility: Primary  Current License: No  Occupation: Unemployed  Leisure and Hobbies: Hanging out with family/dogs, listening to TV, and coloring  Vision: Vision - Basic Assessment  Current Vision:  (Limited vision in L and R eye)   and    Sensation:  Light Touch: No apparent deficits  Strength:  Strength Comments: BUE WFL  Perception:  Inattention/Neglect: Appears intact  Coordination:  Movements are Fluid and Coordinated: Yes  Finger to Nose:  Intact   Hand Function:  Hand Function  Gross Grasp: Functional  Coordination: Functional  Extremities:   RUE   RUE : Within Functional Limits, LUE   LUE: Within Functional Limits,  , and        Outcome Measures: [facility] Daily Activity  Putting on and taking off regular lower body clothing: A little  Bathing (including washing, rinsing, drying): A lot  Putting on and taking off regular upper body clothing: A little  Toileting, which includes using toilet, bedpan or urinal: A little  Taking care of personal grooming such as brushing teeth: A little  Eating Meals: A little  Daily Activity - Total Score: 17         ,     OT Adult Other Outcome Measures  4AT: Negative    Education Documentation  Body Mechanics, taught by APRIL Mccormick at 1/31/2025 11:18 AM.  Learner: Patient  Readiness: Acceptance  Method: Explanation  Response: Verbalizes Understanding    Precautions, taught by APRIL Mccormick at 1/31/2025 11:18 AM.  Learner: Patient  Readiness: Acceptance  Method: Explanation  Response: Verbalizes Understanding    ADL Training, taught by APRIL Mccormick at 1/31/2025 11:18 AM.  Learner: Patient  Readiness: Acceptance  Method: Explanation  Response: Verbalizes Understanding    Education Comments  No comments found.      Goals:   Encounter Problems       Encounter Problems (Active)       ADLs       Patient will perform UB and LB bathing with modified independent level of assistance and PRN AE.       Start:  01/31/25    Expected End:  02/21/25            Patient with complete upper body dressing with independent level of assistance donning and doffing all UE clothes with PRN adaptive equipment while edge of bed        Start:  01/31/25    Expected End:  02/21/25               BALANCE       Pt will maintain dynamic standing balance during ADL task with modified independent level of assistance in order to demonstrate decreased risk of falling and improved postural control.       Start:  01/31/25     Expected End:  02/21/25               MOBILITY       Patient will perform Functional mobility max Household distances/Community Distances with modified independent level of assistance and least restrictive device in order to improve safety and functional mobility.       Start:  01/31/25    Expected End:  02/21/25                   Treatment Completed on Evaluation  Activities of Daily Living: Toilet Transfers  Toilet Transfer From: Bed  Toilet Transfer Type: To and from  Toilet Transfer to: Standard toilet  Toilet Transfer Technique: Ambulating  Toilet Transfers: Supervision  Toilet Transfers Comments: Pt requires supervision with cueing due to safety and decreased vision, endurance, balance, and safety. Pt requires cueing and education for proper hand and body placement on toilet before sitting.  Grooming  Grooming Level of Assistance: Close supervision  Grooming Where Assessed: Standing sinkside  Grooming Comments: Pt requires supervision and cueing during hand washing activity at sinkside due to decreased endurance and safety. After handwashing activity, pt applies deoderant under armpits but requires assistance and cueing to manage hospital gown.           LE Dressing  LE Dressing: Yes  Sock Level of Assistance: Close supervision  LE Dressing Where Assessed: Edge of bed  LE Dressing Comments: Pt requires supervision during donning/doffing socks at EOB. Pt requires cueing to pull LE closer to body for proper body mechanics. Pt prefers to use the bed for assistance rather than Figure 4 technique.  Toileting  Toileting Level of Assistance: Close supervision  Where Assessed: Toilet  Toileting Comments: Pt completes voiding at toilet with supervision and cueing for toileting hygiene. Pt then able to perform hygiene while seated.    Therapy/Activity:     Therapeutic Activity  Therapeutic Activity Performed: Yes  Therapeutic Activity 1: Pt performs ~10 minutes of dynamic standing in bathroom to challenge functional  mobility and balance while performing ADL's to progress to PLOF. Pt tolerates dynamic activity well and experiences no LOB.          01/31/25 at 12:26 PM   APRIL FERRELL   Rehab Office: [phone]

## 2025-01-31 NOTE — DISCHARGE SUMMARY
Discharge Diagnosis  IIH (idiopathic intracranial hypertension)    Test Results Pending At Discharge  Pending Labs       Order Current Status    CSF Culture/Smear Preliminary result          Hospital Course  Jonathan Ayala is a 49 y.o. 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 Patient has required several recent admissions for shunt workup and revision including 10/30 s/p R VPS exploration with abdominal catheter repositioning with improvement in distal runnoff; 11/6 presenting with minimal clear incisional drainage, decrease in vision, incision oversewn with no further leakage noted; 11/15 presenting with fluid leaking from incision site, HA s/p RF shunt wound revision and fractured valve replacement; 12/9 presenting with wound dehiscence/incisional leakage s/p R cranial wound exploration, washout, revision; 1/8 presenting with double vision, nausea and vomiting, and 2 seizure events, s/p LP (OP 27), certas dialed to 4. Patient returned to the [facility] ED on 1/28 with drainage from shunt site, cough and temporal vision changes. CT head stable and SS certas 4. Patient underwent LP with opening pressure of 31. Ophthalmology evaluation with decreased visual acuity, but no noted papilledema. She was admitted to neurosurgery service for further evaluation and management.     1/29 vCTH complete.   1/30 s/p valve exchange (certas at 4)   1/31 Minimal increase in sCre (1.22) s/p fluid bolus with appropriate downtrend. Staple ophthalmology exam.     On the day of discharge, the patient was seen and evaluated by the neurosurgery team and deemed suitable for discharge.  The patient was given detailed discharge instructions and were scheduled to follow up as an outpatient.    Pertinent Physical Exam At Time of Discharge  Constitutional: A&Ox3, calm and cooperative, NAD.  Eyes: PERRL, EOMI.   ENMT: Moist mucous membranes, no apparent injuries or lesions.  Cardiovascular: Normal rate and regular rhythm. 2+ equal pulses of the distal extremities.  Respiratory/Thorax: CTAB, regular respirations on RA. Good symmetric chest expansion.   Gastrointestinal: Abdomen soft, non tender.   Neurological:   A&Ox3  Face symmetric, Facial SILT   Tongue midline and symmetric  RUE D5 / B5 / T5 / HG 5/ IO 5  LUE D5 / B5 / T5 / HG 5/ IO 5  RLE HF5 / KE 5/ DF 5/ PF 5  LLE HF5 / KE 5/ DF 5/ PF 5  No clonus, neg Gonzalez  SILT  Psychological: Appropriate mood and behavior.   Skin: Warm and dry. Cranial incision C/D/I with nylon sutures intact.     Home Medications     Medication List      START taking these medications     oxyCODONE 5 mg immediate release tablet; Commonly known as: Roxicodone;   Take 1 tablet (5 mg) by mouth every 6 hours if needed for severe pain (7 -   10) for up to 5 days.   Senexon-S 8.6-50 mg tablet; Generic drug: sennosides-docusate sodium;   Take 2 tablets by mouth 2 times a day for 5 days.     CONTINUE taking these medications     acetaminophen 325 mg tablet; Commonly known as: Tylenol   Advair -21 mcg/actuation inhaler; Generic drug: fluticasone   propion-salmeteroL   amitriptyline 100 mg tablet; Commonly known as: Elavil; Take 1 tablet   (100 mg) by mouth once daily at bedtime.   azelastine 137 mcg (0.1 %) nasal spray; Commonly known as: Astelin   * Baqsimi 3 mg/actuation spray,non-aerosol; Generic drug: glucagon; USE   ONE SPRAY IN THE NOSE AS NEEDED FOR LOW BLOOD SUGAR  MAY REPEAT AFTER 15   MINUTES USING A NEW DEVICE IF NO RESPONSE   * glucagon 1 mg injection; Commonly known as: Glucagen; Inject 1 mg   under the skin 1 time if needed for low blood sugar - see comments    (hypoglycemia).   carboxymethylcellulose 1 % ophthalmic solution dropperette; Commonly   known as: Refresh Celluvisc   cholecalciferol 5,000 Units tablet; Commonly known as: Vitamin D-3   Dexcom G7  misc; Generic drug: blood-glucose meter,continuous   DEXCOM G7 SENSOR MISC   divalproex 500 mg 24 hr tablet; Commonly known as: Depakote ER   EPINEPHrine 0.3 mg/0.3 mL injection syringe; Commonly known as: Epipen;   INJECT INTRAMUSCULARLY ONCE AS NEEDED FOR ANAPHYLAXIS   ezetimibe 10 mg tablet; Commonly known as: Zetia; Take 1 tablet (10 mg)   by mouth once daily.   fluticasone 50 mcg/actuation nasal spray; Commonly known as: Flonase;   USE 1 SPRAY INTO EACH NOSTRIL ONCE DAILY.   folic acid 1 mg tablet; Commonly known as: Folvite; Take 1 tablet (1 mg)   by mouth once daily.   furosemide 20 mg tablet; Commonly known as: Lasix; Take 1 tablet (20 mg)   by mouth 2 times a day.   gloves, latex with aloe vera misc; Large size gloves   hydrocortisone 10 mg tablet; Commonly known as: Cortef; Take 1 tablet   (10 mg) by mouth 2 times a day. Double the dose if sick for three days   immune globulin (human) infusion; Commonly known as: Gammagard   insulin lispro 100 unit/mL injection; Commonly known as: HumaLOG KwikPen   Insulin; Use as directed to give up to 100 units a day   ipratropium-albuteroL 0.5-2.5 mg/3 mL nebulizer solution; Commonly known   as: Duo-Neb   KlonoPIN 0.5 mg tablet; Generic drug: clonazePAM   lacosamide 150 mg tablet tablet; Commonly known as: Vimpat; Take 2   tablets (300 mg) by mouth 2 times a day.   levETIRAcetam 750 mg tablet; Commonly known as: Keppra; Take 2 tablets   (1,500 mg) by mouth 2 times a day.   levothyroxine 50 mcg tablet; Commonly known as: Synthroid, Levoxyl; Take   1.5 tablets (75 mcg) by mouth once daily in the morning. Take before   meals.   * miconazole 2 % powder; Commonly known as: Micotin   * miconazole 2 % cream; Commonly known as: Micotin   miscellaneous medical supply misc;  "Bath Wipes  fragrance free   moisturizing mouth solution; Commonly known as: Biotene Oral Dry Mouth   montelukast 10 mg tablet; Commonly known as: Singulair; TAKE ONE TABLET   BY MOUTH EVERY DAY AT BEDTIME   Motegrity 2 mg tablet; Generic drug: prucalopride   nasal spray Nayzilam 5 mg/spray (0.1 mL) spray,non-aerosol; Generic   drug: midazolam   ondansetron ODT 4 mg disintegrating tablet; Commonly known as:   Zofran-ODT; Take 1 tablet (4 mg) by mouth every 8 hours if needed for   nausea or vomiting.   OneTouch Delica Plus Lancet 33 gauge misc; Generic drug: lancets   OneTouch Verio test strips strip; Generic drug: blood sugar diagnostic   pen needle, diabetic 32 gauge x 5/32\" needle; Commonly known as: BD Lela   2nd Gen Pen Needle; Use as directed with insulin pen up to 5 times daily   polyethylene glycol 17 gram/dose powder; Commonly known as: Glycolax,   Miralax   propranolol LA 60 mg 24 hr capsule; Commonly known as: Inderal LA; Take   1 capsule (60 mg) by mouth once daily. Do not crush, chew, or split.   Protonix 40 mg EC tablet; Generic drug: pantoprazole   Reyvow 100 mg tablet; Generic drug: lasmiditan; Take 100 mg by mouth if   needed (migraine). Do not drive in 8 hours of taking this medicine.   Maximum one dose per 24 hours.   rOPINIRole 0.5 mg tablet; Commonly known as: Requip   senna 8.6 mg tablet; Generic drug: sennosides   tiZANidine 2 mg tablet; Commonly known as: Zanaflex   Toujeo Max U-300 SoloStar 300 unit/mL (3 mL) injection; Generic drug:   insulin glargine; Inject 54 units under the skin once daily   ubrogepant 100 mg tablet tablet; Commonly known as: Ubrelvy; Take 1   tablet (100 mg) by mouth if needed (migraine). May repeat in 2 hours for   max of 200mg per 24 hours.   Viactiv 650 mg-12.5 mcg-40 mcg chewable tablet; Generic drug:   calcium-vitamin D3-vitamin K   ZINC ORAL  * This list has 4 medication(s) that are the same as other medications   prescribed for you. Read the directions carefully, " and ask your doctor or   other care provider to review them with you.     STOP taking these medications     amoxicillin-pot clavulanate 875-125 mg tablet; Commonly known as:   Augmentin   diclofenac 50 mg EC tablet; Commonly known as: Voltaren     Outpatient Follow-Up  Future Appointments   Date Time Provider Department [place]   2/3/2025  9:00 AM Isidoro Mensah DO [id number] [facility]   2/3/2025 11:30 AM Loyda FitzpatrickD [id number] [facility]   2/6/2025  3:45 PM Rob Mederos MD PhD [id number] [facility]   2/26/2025 12:30 PM Avelion Tafoya MD [id number] [facility]   4/3/2025 12:00 PM Marah Felix MD [id number] [facility]   4/4/2025  3:15 PM Rob Mederos MD PhD [id number] [facility]   5/20/2025  4:00 PM Isidoro Mensah DO [id number] [facility]       Mckenzie Montenegro PA-C    Total face to face time spent with patient/family of >30 minutes, with >50% of the time spent discussing plan of care/management, counseling/educating on disease processes, explaining results of diagnostic testing.

## 2025-01-31 NOTE — PROGRESS NOTES
Physical Therapy    Physical Therapy Evaluation & Treatment    Patient Name: Jonathan Ayala  MRN: [id number]  Department: [place] [address]  Room: [address] [place] [address]  Today's Date: 1/31/2025   Time Calculation  Start Time: 1510  Stop Time: 1534  Time Calculation (min): 24 min    Assessment/Plan   PT Assessment  PT Assessment Results: Impaired balance, Decreased mobility  Barriers to Discharge Home: No anticipated barriers  Evaluation/Treatment Tolerance: Patient tolerated treatment well  Medical Staff Made Aware: Yes  Strengths: Housing layout, Support of Caregivers  End of Session Communication: Bedside nurse  Assessment Comment: 50 yo female, typ Ind with no AD (PRN use of SC).  Pt presenting with shunt site drainage and accompanying symptoms, s/p VPS valve exchange 1/30.  Pt currently limited by deficits in balance, requiring close supv to transfer and ambulate. Would benefit from cont PT while in hosp to address deficits and facilitate return to PLOF.  End of Session Patient Position: Alarm off, not on at start of session (Sitting EOB for meal)   IP OR SWING BED PT PLAN  Inpatient or Swing Bed: Inpatient  PT Plan  Treatment/Interventions: Bed mobility, Transfer training, Gait training, Stair training, Balance training, Therapeutic activity, Therapeutic exercise  PT Plan: Ongoing PT  PT Frequency: 3 times per week  PT Discharge Recommendations: Low intensity level of continued care  PT Recommended Transfer Status: Stand by assist, Assist x1  PT - OK to Discharge: Yes    Subjective     General Visit Information:  General  Reason for Referral: shunt site drainage, vision changes, HA, s/p VPS valve exchange 1/30  Past Medical History Relevant to Rehab: 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  Pt very pleasant, cooperative and agreeable to PT. Pt able to perform transfers and ambulate in [place] with close supv.  Tolerated well.  Home Living:  Home Living  Type of Home: House  Lives With: Spouse  Home Adaptive Equipment: Cane  Home Layout: One level  Home Access: Stairs to enter with rails  Entrance Stairs-Number of Steps: 4 (reports stairs are short height and deep, \"my  constructed them for me\")  Bathroom Shower/Tub: Tub/shower unit  Prior Level of Function:  Prior Function Per Pt/Caregiver Report  Level of [place]: Independent with ADLs and functional transfers  Ambulatory Assistance: Independent  Precautions:  Precautions  Hearing/Visual Limitations: Limited vision, but able to see shapes and outlines claribel closer up  Medical Precautions: Fall precautions    Objective   Pain:  Pain Assessment  Pain Assessment:  (No reports of pain during visit)  Cognition:  Cognition  Overall Cognitive Status: Within Functional Limits  Arousal/Alertness: Appropriate responses to stimuli  Orientation Level: Oriented X4  Following Commands: Follows all commands and directions without difficulty    General Assessments:     Activity Tolerance  Endurance: Endurance does not limit participation in activity    Sensation  Light Touch: No apparent deficits    Coordination  Movements are Fluid and Coordinated: Yes    Postural Control  Postural Control: Within Functional Limits    Static Sitting Balance  Static Sitting-Level of Assistance: Independent  Dynamic Sitting Balance  Dynamic Sitting-Level of Assistance: Independent    Static Standing Balance  Static Standing-Level of " Assistance: Close supervision  Dynamic Standing Balance  Dynamic Standing-Level of Assistance: Close supervision  Functional Assessments:     Bed Mobility 1  Bed Mobility 1: Supine to sitting  Level of Assistance 1: Independent    Transfers  Transfer: Yes  Transfer 1  Transfer From 1: Sit to, Stand to  Transfer to 1: Sit  Transfer Level of Assistance 1: Close supervision, Minimal verbal cues    Ambulation/Gait Training  Ambulation/Gait Training Performed: Yes  Ambulation/Gait Training 1  Surface 1: Level tile, Carpet  Device 1: No device  Assistance 1: Close supervision  Quality of Gait 1: Decreased step length (Mildly increased lateral sway and path deviation to start, but minimized after first 25 feet)  Comments/Distance (ft) 1: 200 feet    Extremity/Trunk Assessments:    RLE   RLE : Within Functional Limits  LLE   LLE : Within Functional Limits  Treatments:    Bed Mobility 1  Bed Mobility 1: Supine to sitting  Level of Assistance 1: Independent    Ambulation/Gait Training  Ambulation/Gait Training Performed: Yes  Ambulation/Gait Training 1  Surface 1: Level tile, Carpet  Device 1: No device  Assistance 1: Close supervision  Quality of Gait 1: Decreased step length (Mildly increased lateral sway and path deviation to start, but minimized after first 25 feet)  Comments/Distance (ft) 1: 200 feet  Transfers  Transfer: Yes  Transfer 1  Transfer From 1: Sit to, Stand to  Transfer to 1: Sit  Transfer Level of Assistance 1: Close supervision, Minimal verbal cues    Outcome Measures:    [facility] Basic Mobility  Turning from your back to your side while in a flat bed without using bedrails: None  Moving from lying on your back to sitting on the side of a flat bed without using bedrails: None  Moving to and from bed to chair (including a wheelchair): A little  Standing up from a chair using your arms (e.g. wheelchair or bedside chair): A little  To walk in hospital room: A little  Climbing 3-5 steps with railing: A  elia  Basic Mobility - Total Score: 20    Encounter Problems       Encounter Problems (Active)       Balance       Tinetti>24 to reflect improvement in balance and decreased falls risk  (Progressing)       Start:  01/31/25    Expected End:  02/14/25               Mobility       Ambulate >300 feet, no device, Ind  (Progressing)       Start:  01/31/25    Expected End:  02/14/25            Up/dn 4 stairs, Ind (Progressing)       Start:  01/31/25    Expected End:  02/14/25               PT Transfers       sit<>stand, bed<>chair, no device, Ind  (Progressing)       Start:  01/31/25    Expected End:  02/14/25               Pain - Adult              Education Documentation  Body Mechanics, taught by Rob Mckeon, PT at 1/31/2025  4:50 PM.  Learner: Patient  Readiness: Eager  Method: Explanation  Response: Verbalizes Understanding  Comment: Role of PT, POC, progression of mobility    Mobility Training, taught by Rob Mckeon, PT at 1/31/2025  4:50 PM.  Learner: Patient  Readiness: Eager  Method: Explanation  Response: Verbalizes Understanding  Comment: Role of PT, POC, progression of mobility    Education Comments  No comments found.

## 2025-01-31 NOTE — PROGRESS NOTES
Care Transitions Progress Note:  Plan per medical team: IIH; s/p  shunt revision  Team Members Present: NP: MD: AMARA: JUDY  Dispo: home with home health if agency can accept.  Patient's choice is [facility]. OP rx.s provided if no agency can accept.    Status: inpatient  Payor:[facility] comm plan  Barriers:none  Adod: today

## 2025-01-31 NOTE — PROGRESS NOTES
"Jonathan Ayala is a 49 y.o. female on day 2 of admission presenting with IIH (idiopathic intracranial hypertension).      Subjective   She is status post (s/p) exploration and revision of  shunt with neurosurgery (1/30/25) with findings concerning for valve failure. She reports her vision in both eyes is slightly more clear and feels her peripheral in the left eye is somewhat improved.        Objective     Last Recorded Vitals  Blood pressure 120/76, pulse 108, temperature 36 °C (96.8 °F), temperature source Temporal, resp. rate 16, height 1.575 m (5' 2\"), weight 115 kg (253 lb), SpO2 93%.    Physical Exam  Base Eye Exam       Visual Acuity (Near card, +2.50 readers)         Right Left    Near cc 20/30+2 20/40+2              Pupils         Dark Light Shape React APD    Right 5 4 Round Sluggish None    Left 5 4 Round Sluggish Trace              Visual Fields (Counting fingers)         Left Right                                Extraocular Movement         Right Left     Full, Ortho Full, Ortho              Neuro/Psych       Oriented x3: Yes                  Additional Tests       Color         Right Left    [place] 7.5/11 4/11                      Assessment/Plan   Assessment & Plan  IIH (idiopathic intracranial hypertension)    Nonintractable headache, unspecified chronicity pattern, unspecified headache type    H/O  shunt revision    #Idiopathic intracranial hypertension  #NAION, both eyes   #S/p  shunt  - 49 y.o. female with PMH of left non-arteritic anterior ischemic optic neuropathy, bilateral sequential vision loss with right eye improvement 11/13/2019, idiopathic intracranial hypertension status post ventriculoperitoneal shunt , seizures, scleroderma, Sjogren, CVID on monthly IVIG, POTS, HTN, DM2, hypothyroidism, BRENT on CPAP, migraine recently s/p  shunt revision on 10/30, s/p  shunt explantation and replacement of fractured valve and revision of cranial incision 11/15/24, presenting for headache, " vision changes, and possible shunt leakage.   - 1/28 Shunt studies normal and CTH without evidence of ventriculomegaly. LP performed with elevated opening pressure of 31  - Now status post (s/p) shunt exploration and revision (1/30/25) with concern for valve failure causing elevated intracranial pressure (ICP)    - 1/31 with stable exam findings. No further intervention from ophthalmology standpoint  - Patient has upcoming appt with Dr. Mederos (neuro-ophthalmology) on 2/6/25.     Discussed with Dr. Rob Mederos, neuro-ophthalmology attending.       Rob Estrada MD  Ophthalmology, PGY3     Ophthalmology Adult Pager - [phone]     For adult follow-up appointments, call: [phone]

## 2025-01-31 NOTE — SIGNIFICANT EVENT
Rapid Response Nurse Note: RADAR alert: 6    Pager time: 20  Arrival time: 20  Event end time: 30  Location: [id number]  [] Triage by phone or secure messaging    Rapid response initiated by:  [] Rapid response RN [] Family [] Nursing Supervisor [] Physician   [x] RADAR auto page [] Sepsis auto-page [] RN [] RT   [] NP/PA [] Other:     Primary reason for call:   [] BAT [] New CPAP/BiPAP [] Bleeding [] Change in mental status   [] Chest pain [] Code blue [] FiO2 >/= 50% [] HR </= 40 bpm   [] HR >/= 130 bpm [] Hyperglycemia [] Hypoglycemia [x] RADAR    [] RR </= 8 bpm [] RR >/= 30 bpm [] SBP </= 90 mmHg [] SpO2 < 90%   [] Seizure [] Sepsis [] Shortness of breath  [] Staff concern: see comments     Initial VS and/or RADAR VS: T 36 °C; HR 93; RR 13; BP 94/66; SPO2 95%.    Providers present at bedside (if applicable): na    Name of ICU Provider contacted (if applicable): na    Interventions:  [x] None [] ABG/VBG [] Assist w/ICU transfer [] BAT paged    [] Bag mask [] Blood [] Cardioversion [] Code Blue   [] Code blue for intubation [] Code status changed [] Chest x-ray [] EKG   [] IV fluid/bolus [] KUB x-ray [] Labs/cultures [] Medication   [] Nebulizer treatment [] NIPPV (CPAP/BiPAP) [] Oxygen [] Oral airway   [] Peripheral IV [] Palliative care consult [] CT/MRI [] Sepsis protocol    [] Suctioned [] Other:     Outcome:  [] Coded and  [] Code blue for intubation [] Coded and transferred to ICU []  on division   [x] Remained on division (no change) [] Remained on division + additional monitoring [] Remained in ED [] Transferred to ED   [] Transferred to ICU [] Transferred to inpatient status [] Transferred for interventions (procedure) [] Transferred to ICU stepdown    [] Transferred to surgery [] Transferred to telemetry [] Sepsis protocol [] STEMI protocol   [] Stroke protocol [x] Bedside nurse instructed to page rapid response for any concerns or acute change in condition/VS     Additional Comments:  Reviewed above RADAR VS with bedside RN.  VS within patient's current trends.  Patient denied pain, shortness of breath, dizziness or lightheadedness.  No interventions by rapid response team indicated at this time.  Staff to page rapid response for any concerns or acute change in condition/VS.

## 2025-02-01 LAB
BACTERIA CSF CULT: NORMAL
GRAM STN SPEC: NORMAL
GRAM STN SPEC: NORMAL

## 2025-02-03 ENCOUNTER — PATIENT OUTREACH (OUTPATIENT)
Dept: PRIMARY CARE | Facility: CLINIC | Age: 50
End: 2025-02-03

## 2025-02-03 ENCOUNTER — OFFICE VISIT (OUTPATIENT)
Dept: PRIMARY CARE | Facility: CLINIC | Age: 50
End: 2025-02-03
Payer: MEDICAID

## 2025-02-03 ENCOUNTER — APPOINTMENT (OUTPATIENT)
Dept: ENDOCRINOLOGY | Facility: CLINIC | Age: 50
End: 2025-02-03
Payer: MEDICAID

## 2025-02-03 VITALS
SYSTOLIC BLOOD PRESSURE: 110 MMHG | HEART RATE: 97 BPM | BODY MASS INDEX: 48.21 KG/M2 | DIASTOLIC BLOOD PRESSURE: 70 MMHG | WEIGHT: 262 LBS | HEIGHT: 62 IN

## 2025-02-03 DIAGNOSIS — M35.01 SJOGREN'S SYNDROME WITH KERATOCONJUNCTIVITIS SICCA (MULTI): ICD-10-CM

## 2025-02-03 DIAGNOSIS — J45.40 MODERATE PERSISTENT ALLERGIC ASTHMA (HHS-HCC): ICD-10-CM

## 2025-02-03 DIAGNOSIS — E11.43 DIABETIC GASTROPARESIS ASSOCIATED WITH TYPE 2 DIABETES MELLITUS (MULTI): Primary | ICD-10-CM

## 2025-02-03 DIAGNOSIS — Z79.4 TYPE 2 DIABETES MELLITUS WITH DIABETIC AUTONOMIC NEUROPATHY, WITH LONG-TERM CURRENT USE OF INSULIN: ICD-10-CM

## 2025-02-03 DIAGNOSIS — G93.2 IDIOPATHIC INTRACRANIAL HYPERTENSION: ICD-10-CM

## 2025-02-03 DIAGNOSIS — E11.65 TYPE 2 DIABETES MELLITUS WITH HYPERGLYCEMIA, WITH LONG-TERM CURRENT USE OF INSULIN: ICD-10-CM

## 2025-02-03 DIAGNOSIS — E11.43 TYPE 2 DIABETES MELLITUS WITH DIABETIC AUTONOMIC NEUROPATHY, WITH LONG-TERM CURRENT USE OF INSULIN: ICD-10-CM

## 2025-02-03 DIAGNOSIS — Z98.2 VP (VENTRICULOPERITONEAL) SHUNT STATUS: ICD-10-CM

## 2025-02-03 DIAGNOSIS — G40.909 SEIZURE DISORDER (MULTI): ICD-10-CM

## 2025-02-03 DIAGNOSIS — F33.42 MAJOR DEPRESSIVE DISORDER, RECURRENT, IN FULL REMISSION WITH ANXIOUS DISTRESS (CMS-HCC): ICD-10-CM

## 2025-02-03 DIAGNOSIS — I47.10 SVT (SUPRAVENTRICULAR TACHYCARDIA) (CMS-HCC): ICD-10-CM

## 2025-02-03 DIAGNOSIS — D83.9 CVID (COMMON VARIABLE IMMUNODEFICIENCY): ICD-10-CM

## 2025-02-03 DIAGNOSIS — Z79.4 TYPE 2 DIABETES MELLITUS WITH HYPERGLYCEMIA, WITH LONG-TERM CURRENT USE OF INSULIN: ICD-10-CM

## 2025-02-03 DIAGNOSIS — J45.50 SEVERE PERSISTENT ASTHMA WITHOUT COMPLICATION (MULTI): ICD-10-CM

## 2025-02-03 DIAGNOSIS — K31.84 DIABETIC GASTROPARESIS ASSOCIATED WITH TYPE 2 DIABETES MELLITUS (MULTI): Primary | ICD-10-CM

## 2025-02-03 PROBLEM — T38.0X5A: Status: RESOLVED | Noted: 2024-06-25 | Resolved: 2025-02-03

## 2025-02-03 PROBLEM — R51.9 NONINTRACTABLE HEADACHE, UNSPECIFIED CHRONICITY PATTERN, UNSPECIFIED HEADACHE TYPE: Status: RESOLVED | Noted: 2025-01-29 | Resolved: 2025-02-03

## 2025-02-03 PROBLEM — E27.3: Status: RESOLVED | Noted: 2024-06-25 | Resolved: 2025-02-03

## 2025-02-03 PROCEDURE — 99496 TRANSJ CARE MGMT HIGH F2F 7D: CPT | Performed by: INTERNAL MEDICINE

## 2025-02-03 PROCEDURE — 3074F SYST BP LT 130 MM HG: CPT | Performed by: INTERNAL MEDICINE

## 2025-02-03 PROCEDURE — 3008F BODY MASS INDEX DOCD: CPT | Performed by: INTERNAL MEDICINE

## 2025-02-03 PROCEDURE — RXMED WILLOW AMBULATORY MEDICATION CHARGE

## 2025-02-03 PROCEDURE — 3078F DIAST BP <80 MM HG: CPT | Performed by: INTERNAL MEDICINE

## 2025-02-03 PROCEDURE — 1036F TOBACCO NON-USER: CPT | Performed by: INTERNAL MEDICINE

## 2025-02-03 RX ORDER — INSULIN GLARGINE 300 [IU]/ML
INJECTION, SOLUTION SUBCUTANEOUS
Qty: 6 ML | Refills: 6 | Status: SHIPPED | OUTPATIENT
Start: 2025-02-03

## 2025-02-03 ASSESSMENT — ENCOUNTER SYMPTOMS
PHOTOPHOBIA: 1
FATIGUE: 1
OCCASIONAL FEELINGS OF UNSTEADINESS: 0
HEADACHES: 1
DEPRESSION: 0
LOSS OF SENSATION IN FEET: 0

## 2025-02-03 NOTE — PROGRESS NOTES
Patient  and  re introduced to Chronic Care Management Services   Patient opted into services on 2/3  Services will be performed under the direction of PCP: Dr Mensah  Patient has planned May 2025  I have discussed the nature and availability of the service with the patient, the patient's responsibility for potential cost sharing, only one practitioner furnishing services during a calendar month, and the right to stop services at any time.     Will reach out to pt and wife later this afternoon to get idea of what they need help with. Pt stated has been trying to get a walk in shower, working with waiver but nothing done yet.   RISHABH Herron . Will try to get in touch to see what we need  Pt is going to get the codes she needs for any supplies and then update me so we can order.     Left message for stella madison at [facility]   Outreach to pt but had to leave a message.

## 2025-02-03 NOTE — PROGRESS NOTES
"Patient: Jonathan Ayala  : 1975  PCP: Isidoro Mensah DO  MRN: [id number]  Program: [employer] Non-Core  Status: Enrolled  Effective Dates: 10/27/2021 - present  Responsible Staff: No information to display  Social Drivers to be Addressed: No information to display    M[employer]raine Core  Status: Enrolled  Effective Dates: 2023 - present  Responsible Staff: No information to display  Social Drivers to be Addressed: No information to display    Miscellaneous Non-Core  Status: Enrolled  Effective Dates: 10/15/2023 - present  Responsible Staff: [facility]  Social Drivers to be Addressed: No information to display    Chronic Care Management (CMS)  Status: Enrolled  Effective Dates: 2/3/2025 - present  Responsible Staff: Zari Jaquez RN  Social Drivers to be Addressed: No information to display         Jonathan Ayala is a 49 y.o. female presenting today for follow-up after being discharged from the hospital 3 days ago. The main problem requiring admission was shunt replacement for intracranial htn . The discharge summary and/or Transitional Care Management documentation was reviewed. Medication reconciliation was performed as indicated via the \"Kulwant as Reviewed\" timestamp.     Jonathan Ayala was contacted by Transitional Care Management services two days after her discharge. This encounter and supporting documentation was reviewed.    Review of Systems   Constitutional:  Positive for fatigue.   Eyes:  Positive for photophobia and visual disturbance.   Neurological:  Positive for headaches.       /70   Pulse 97   Ht 1.575 m (5' 2\")   Wt 119 kg (262 lb)   BMI 47.92 kg/m²     Physical Exam  Vitals and nursing note reviewed.   Constitutional:       General: She is not in acute distress.     Appearance: Normal appearance. She is well-developed. She is obese. She is not toxic-appearing.   HENT:      Head: Normocephalic and atraumatic.      Right Ear: Tympanic membrane and external ear normal. "      Left Ear: Tympanic membrane and external ear normal.      Nose: Nose normal.      Mouth/Throat:      Mouth: Mucous membranes are moist.      Pharynx: Oropharynx is clear. No oropharyngeal exudate or posterior oropharyngeal erythema.      Tonsils: No tonsillar exudate. 2+ on the right. 2+ on the left.   Eyes:      Extraocular Movements: Extraocular movements intact.      Conjunctiva/sclera: Conjunctivae normal.   Cardiovascular:      Rate and Rhythm: Normal rate and regular rhythm.      Pulses: Normal pulses.      Heart sounds: Normal heart sounds. No murmur heard.  Pulmonary:      Effort: Pulmonary effort is normal.      Breath sounds: Normal breath sounds.   Abdominal:      General: Abdomen is flat. Bowel sounds are normal.      Palpations: Abdomen is soft.   Musculoskeletal:      Cervical back: Neck supple.   Lymphadenopathy:      Cervical: No cervical adenopathy.   Skin:     General: Skin is warm and dry.      Findings: No rash.   Neurological:      Mental Status: She is alert. Mental status is at baseline.   Psychiatric:         Mood and Affect: Mood normal.         Behavior: Behavior normal.         Thought Content: Thought content normal.         Judgment: Judgment normal.         The complexity of medical decision making for this patient's transitional care is high.    Assessment/Plan   Jonathan was seen today for follow-up.  Diagnoses and all orders for this visit:  Diabetic gastroparesis associated with type 2 diabetes mellitus (Multi) (Primary)  Continue current Toujeo insulin 54 units in the morningWith NovoLog insulin using sliding scale for meal coverage reevaluate A1c next visit manages with endocrinologist  -     Follow Up In Advanced [facility] - Care Manager; Future  -     Comprehensive Metabolic Panel; Future  -     Hemoglobin A1C; Future  -     Lipid Panel; Future  -     Albumin-Creatinine Ratio, Urine Random; Future  -     Comprehensive Metabolic Panel  -     Hemoglobin A1C  -     Lipid  Panel  -     Albumin-Creatinine Ratio, Urine Random  Type 2 diabetes mellitus with hyperglycemia, with long-term current use of insulin  Class 3 severe obesity due to excess calories with serious comorbidity and body mass index (BMI) of 45.0 to 49.9 in adult  The patient received Current weight: 119 kg (262 lb)  Weight change since last visit (-) denotes wt loss 9 lbs   Weight loss needed to achieve BMI 25: 125.6 Lbs  Weight loss needed to achieve BMI 30: 98.3 Lbs    Provided instructions on dietary changes  Provided instructions on exercise  Advised to Increase physical activity because they have an above normal BMI.   Sjogren's syndrome with keratoconjunctivitis sicca (Multi)  Major depressive disorder, recurrent, in full remission with anxious distress (CMS-HCC)  Continue clonazepam 0.5 mg twice a day with amitriptyline 100 mg nightly  CVID (common variable immunodeficiency)  Continue treatment with rheumatologist with Gammagard infusion  Complicated migraine  Follows with neurologist complex started on lasmiditan 100 mg as needed and Ubrelvy 100 mg  Idiopathic intracranial hypertension  Shunt required replacement appears to be functioning at this time continue tomorrow with neurosurgery  Seizure disorder (Multi)  No further seizures noted continue on levetiracetam 1500 mg twice a day with lacosamide 300 mg twice a day managed by neurologist   (ventriculoperitoneal) shunt status  Patient with severe gait instability would benefit from walk-in shower replacement at home due to severe multiple comorbid risks of falls related to intracranial hypertension severe diabetic autonomic polyneuropathy placing her at high risk for falls and injury which have already occurred at home occupational and physical therapy should be maximized at home with home care is much as possible to prevent further injury and trauma  Moderate persistent allergic asthma ([facility])  SVT (supraventricular tachycardia) (CMS-HCC)  Continue on  propranolol LA 60 mg daily  Severe persistent asthma without complication (Multi)  Type 2 diabetes mellitus without complication, unspecified whether long term insulin use (Multi)  -     insulin glargine (Toujeo Max Solostar- 2 unit dial) 300 unit/mL (3 mL) injection; Inject 54 units every morning     Patient was identified as a fall risk. Risk prevention instructions provided.

## 2025-02-03 NOTE — PROGRESS NOTES
"Subjective   Patient ID: Jonathan Ayala is a 49 y.o. female who presents for Follow-up.    HPI     Review of Systems    Objective   /70   Pulse 97   Ht 1.575 m (5' 2\")   Wt 119 kg (262 lb)   BMI 47.92 kg/m²     Physical Exam    Assessment/Plan          "

## 2025-02-04 ENCOUNTER — PATIENT MESSAGE (OUTPATIENT)
Dept: PRIMARY CARE | Facility: CLINIC | Age: 50
End: 2025-02-04
Payer: MEDICAID

## 2025-02-04 ENCOUNTER — PATIENT OUTREACH (OUTPATIENT)
Dept: PRIMARY CARE | Facility: CLINIC | Age: 50
End: 2025-02-04
Payer: COMMERCIAL

## 2025-02-04 DIAGNOSIS — Z98.2 VP (VENTRICULOPERITONEAL) SHUNT STATUS: ICD-10-CM

## 2025-02-04 DIAGNOSIS — E11.65 TYPE 2 DIABETES MELLITUS WITH HYPERGLYCEMIA, WITH LONG-TERM CURRENT USE OF INSULIN: ICD-10-CM

## 2025-02-04 DIAGNOSIS — B37.31 VAGINAL MONILIASIS: Primary | ICD-10-CM

## 2025-02-04 DIAGNOSIS — D83.9 CVID (COMMON VARIABLE IMMUNODEFICIENCY): ICD-10-CM

## 2025-02-04 DIAGNOSIS — Z79.4 TYPE 2 DIABETES MELLITUS WITH HYPERGLYCEMIA, WITH LONG-TERM CURRENT USE OF INSULIN: ICD-10-CM

## 2025-02-04 DIAGNOSIS — E66.813 CLASS 3 SEVERE OBESITY DUE TO EXCESS CALORIES WITH SERIOUS COMORBIDITY AND BODY MASS INDEX (BMI) OF 45.0 TO 49.9 IN ADULT: ICD-10-CM

## 2025-02-04 DIAGNOSIS — E66.01 CLASS 3 SEVERE OBESITY DUE TO EXCESS CALORIES WITH SERIOUS COMORBIDITY AND BODY MASS INDEX (BMI) OF 45.0 TO 49.9 IN ADULT: ICD-10-CM

## 2025-02-04 PROBLEM — E11.9 TYPE 2 DIABETES MELLITUS WITHOUT COMPLICATION, WITH LONG-TERM CURRENT USE OF INSULIN (MULTI): Status: ACTIVE | Noted: 2025-02-04

## 2025-02-05 ENCOUNTER — APPOINTMENT (OUTPATIENT)
Dept: NEUROSURGERY | Facility: HOSPITAL | Age: 50
End: 2025-02-05
Payer: MEDICAID

## 2025-02-05 LAB
ATRIAL RATE: 80 BPM
BACTERIA CSF CULT: NORMAL
GRAM STN SPEC: NORMAL
GRAM STN SPEC: NORMAL
P AXIS: 53 DEGREES
P OFFSET: 188 MS
P ONSET: 125 MS
PR INTERVAL: 188 MS
Q ONSET: 219 MS
QRS COUNT: 13 BEATS
QRS DURATION: 94 MS
QT INTERVAL: 418 MS
QTC CALCULATION(BAZETT): 482 MS
QTC FREDERICIA: 460 MS
R AXIS: 20 DEGREES
T AXIS: 88 DEGREES
T OFFSET: 428 MS
VENTRICULAR RATE: 80 BPM

## 2025-02-05 RX ORDER — FLUCONAZOLE 150 MG/1
TABLET ORAL
Qty: 2 TABLET | Refills: 6 | Status: SHIPPED | OUTPATIENT
Start: 2025-02-05

## 2025-02-06 ENCOUNTER — APPOINTMENT (OUTPATIENT)
Dept: OPHTHALMOLOGY | Facility: CLINIC | Age: 50
End: 2025-02-06
Payer: MEDICAID

## 2025-02-06 ENCOUNTER — PATIENT OUTREACH (OUTPATIENT)
Dept: PRIMARY CARE | Facility: CLINIC | Age: 50
End: 2025-02-06

## 2025-02-11 ENCOUNTER — TELEMEDICINE (OUTPATIENT)
Dept: NEUROLOGY | Facility: CLINIC | Age: 50
End: 2025-02-11
Payer: MEDICAID

## 2025-02-11 DIAGNOSIS — G40.909 SEIZURE DISORDER (MULTI): ICD-10-CM

## 2025-02-11 DIAGNOSIS — R41.0 CONFUSION: ICD-10-CM

## 2025-02-11 DIAGNOSIS — G43.711 CHRONIC MIGRAINE WITHOUT AURA, INTRACTABLE, WITH STATUS MIGRAINOSUS: Primary | ICD-10-CM

## 2025-02-11 DIAGNOSIS — H47.012 NAION (NON-ARTERITIC ANTERIOR ISCHEMIC OPTIC NEUROPATHY), LEFT EYE: ICD-10-CM

## 2025-02-11 DIAGNOSIS — Z98.890 H/O VP SHUNT REVISION: ICD-10-CM

## 2025-02-11 DIAGNOSIS — G43.109 COMPLICATED MIGRAINE: ICD-10-CM

## 2025-02-11 DIAGNOSIS — G47.00 INSOMNIA, UNSPECIFIED TYPE: ICD-10-CM

## 2025-02-11 PROCEDURE — 99215 OFFICE O/P EST HI 40 MIN: CPT

## 2025-02-11 NOTE — PROGRESS NOTES
"Virtual or Telephone Consent    An interactive audio and video telecommunication system which permits real time communications between the patient (at the originating site) and provider (at the distant site) was utilized to provide this telehealth service.   Verbal consent was requested and obtained from Jonathan Ayala on this date, 2/11/25 for a telehealth visit.      Subjective   HPI  Jonathan Ayala is a 49 y.o. year old female who presents with chief complaint Chronic migraine without aura, intractable, with status migrainosus [G43.711] PMH significant for IIH, optic neuritis, focal seizures, and chronic back pain. Patient of Dr. Bai, last visit 7/2024. Concern for issues with memory.    Optic neuritis of both eyes June 2024. 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 Also had onset of IIH- dx. 2022, LP OP 25. \"Several surgeries since 9/2024, opening pressure was johnathan-high.\" Is also S/P shunt revision. Follows with Dr. Mederos/ neuro-ophthalmology.    Jonathan is experiencing  2 to 3 migraine HA days per month. Has used special glasses from the [facility] to decrease light.   Triggers include sun, bright/ flashing lights, barometric pressure changes, excessive sodium, strong odors \"generic sharpies\" and \"old lady perfume.\" Does experience an aura of  flashing white lights before the onset of migraines.   The headaches are usually unilateral, sometimes holocephalic. The pain feels like pressure, severity between a 4 and a 10.   Associated photophobia, phonophobia, and nausea- ondansetron sometimes helpful.  Has had several headaches that she could not abort with medication, but believes it was due to needing adjustments for her shunt.   Sleep: Dr. Tyler/ [facility] for sleep issues night terrors and \"sleep seizures\". Has not had since on amitriptyline 100 mg- recent increase has helped. Averages 7 to 8 hours per night. Only waking up to go to the bathroom.   Stressors: Current health issues.   Mood has been well-managed " "with amitriptyline. Not currently seeing counselor (current, established counselor is taking a break). Will re-establish if needed.   Dr. Wyman/[facility] for seizures. Seizures got worse after increase of vimpat to 300 mg (continues on Kepprea 1500 mg BID). Would like her to go inpatient for 3 to 5 days for further testing.  Issues with the right eye- sees Dr. Mederos   Complaint of short-term memory issues, would like testing.  Sister-intracerebral hematoma as a child- \"is really different now\"  The patient denies the presence of any new-onset double vision, speech problems, facial or extremity weakness or numbness or problems with coordination.     Current/ past HA treatments:  Preventive:  Amitriptyline- current  Aimovig- stopped working  Qulipta  Amitriptyline  flexeril  Tizanidine (for neck/back pain- not working well)  Ajovy ineffective  Botox worked for 3 to 4 years, stopped   Rescue:  Nurtec- did not start  Ubrelvy- stopped working; hives when she was taking  Reyvow- uses sparingly  Triptans & DHE- cannot use due to raynaud's/ night terrors  Zofran in past- stopped working, switched to Phenergan.    Current Outpatient Medications:     acetaminophen (Tylenol) 325 mg tablet, Take 1-2 tablets (325-650 mg) by mouth every 6 hours if needed for mild pain (1 - 3). Days of infusions, Disp: , Rfl:     Advair -21 mcg/actuation inhaler, INHALE TWO PUFFS BY MOUTH AS INSTRUCTED TWO TIMES A DAY., Disp: , Rfl:     amitriptyline (Elavil) 100 mg tablet, Take 1 tablet (100 mg) by mouth once daily at bedtime., Disp: 30 tablet, Rfl: 11    azelastine (Astelin) 137 mcg (0.1 %) nasal spray, Administer 1 spray into each nostril 2 times a day. Use in each nostril as directed, Disp: , Rfl:     blood-glucose sensor (DEXCOM G7 SENSOR MISC), Use to check blood sugar continuously throughout the day as directed. Change sensor every 10 days., Disp: , Rfl:     calcium-vitamin D3-vitamin K (Viactiv) 650 mg-12.5 mcg-40 mcg chewable tablet, " Chew 2 tablets once daily. At lunch, Disp: , Rfl:     carboxymethylcellulose (Refresh Celluvisc) 1 % ophthalmic solution dropperette, Administer 2 drops into both eyes 3 times a day as needed for dry eyes., Disp: , Rfl:     cholecalciferol (Vitamin D-3) 5,000 Units tablet, Take 1 tablet (5,000 Units) by mouth once daily., Disp: , Rfl:     clonazePAM (KlonoPIN) 0.5 mg tablet, Take 1 tablet (0.5 mg) by mouth 2 times a day., Disp: , Rfl:     Dexcom G7  misc, Use as instructed to check blood sugars continuously throughout the day, Disp: , Rfl:     divalproex (Depakote ER) 500 mg 24 hr tablet, Take 1 tablet (500 mg) by mouth 2 times a day., Disp: , Rfl:     EPINEPHrine 0.3 mg/0.3 mL injection syringe, INJECT INTRAMUSCULARLY ONCE AS NEEDED FOR ANAPHYLAXIS, Disp: 2 each, Rfl: 0    ezetimibe (Zetia) 10 mg tablet, Take 1 tablet (10 mg) by mouth once daily., Disp: 30 tablet, Rfl: 11    fluconazole (Diflucan) 150 mg tablet, 1 tablet by mouth daily spread 72 hours apart, Disp: 2 tablet, Rfl: 6    fluticasone (Flonase) 50 mcg/actuation nasal spray, USE 1 SPRAY INTO EACH NOSTRIL ONCE DAILY., Disp: 16 g, Rfl: 3    folic acid (Folvite) 1 mg tablet, Take 1 tablet (1 mg) by mouth once daily., Disp: 90 tablet, Rfl: 3    furosemide (Lasix) 20 mg tablet, Take 1 tablet (20 mg) by mouth 2 times a day., Disp: 180 tablet, Rfl: 3    gloves, latex with aloe vera misc, Large size gloves, Disp: 200 each, Rfl: 3    glucagon (Baqsimi) 3 mg/actuation spray,non-aerosol, USE ONE SPRAY IN THE NOSE AS NEEDED FOR LOW BLOOD SUGAR  MAY REPEAT AFTER 15 MINUTES USING A NEW DEVICE IF NO RESPONSE, Disp: 1 each, Rfl: 3    glucagon (Glucagen) 1 mg injection, Inject 1 mg under the skin 1 time if needed for low blood sugar - see comments (hypoglycemia)., Disp: 1 each, Rfl: 12    hydrocortisone (Cortef) 10 mg tablet, Take 1 tablet (10 mg) by mouth 2 times a day. Double the dose if sick for three days, Disp: 70 tablet, Rfl: 11    immune globulin, human,  (Gammagard) infusion, Infuse 600 mL (60 g) into a venous catheter every 14 (fourteen) days., Disp: , Rfl:     insulin glargine (Toujeo Max Solostar- 2 unit dial) 300 unit/mL (3 mL) injection, Inject 54 units every morning, Disp: 6 mL, Rfl: 6    insulin lispro (HumaLOG KwikPen Insulin) 100 unit/mL injection, Use as directed to give up to 100 units a day, Disp: 90 mL, Rfl: 3    ipratropium-albuteroL (Duo-Neb) 0.5-2.5 mg/3 mL nebulizer solution, Inhale 3 mL 4 times a day as needed., Disp: , Rfl:     lacosamide (Vimpat) 150 mg tablet tablet, Take 2 tablets (300 mg) by mouth 2 times a day., Disp: 120 tablet, Rfl: 1    lasmiditan (Reyvow) 100 mg tablet, Take 1 tablet (100 mg) by mouth if needed for migraine. Do not drive in 8 hours of taking this medicine. Maximum 1 dose per 24 hours., Disp: 9 tablet, Rfl: 5    levETIRAcetam (Keppra) 750 mg tablet, Take 2 tablets (1,500 mg) by mouth 2 times a day., Disp: 120 tablet, Rfl: 1    levothyroxine (Synthroid, Levoxyl) 50 mcg tablet, Take 1.5 tablets (75 mcg) by mouth once daily in the morning. Take before meals., Disp: 45 tablet, Rfl: 11    miconazole (Micotin) 2 % cream, Apply 1 Application topically once daily as needed (rash)., Disp: , Rfl:     miconazole (Micotin) 2 % powder, Apply to groin , under the breast and skin folds daily, Disp: , Rfl:     miscellaneous medical supply mis, Bath Wipes  fragrance free, Disp: 200 each, Rfl: 3    moisturizing mouth (Biotene Oral Dry Mouth) solution, Take 1 spray by mouth 4 times a day as needed., Disp: , Rfl:     montelukast (Singulair) 10 mg tablet, TAKE ONE TABLET BY MOUTH EVERY DAY AT BEDTIME, Disp: 90 tablet, Rfl: 0    Motegrity 2 mg tablet, Take 1 tablet (2 mg) by mouth once daily., Disp: , Rfl:     nasal spray Nayzilam 5 mg/spray (0.1 mL) spray,non-aerosol, Administer into affected nostril(s) 1 time., Disp: , Rfl:     ondansetron ODT (Zofran-ODT) 4 mg disintegrating tablet, Take 1 tablet (4 mg) by mouth every 8 hours if needed for  "nausea or vomiting., Disp: 20 tablet, Rfl: 2    OneTouch Delica Plus Lancet 33 gauge misc, USE 1 LANCET TO CHECK GLUCOSE ONCE DAILY AS DIRECTED, Disp: , Rfl:     OneTouch Verio test strips strip, USE 1 STRIP TO CHECK GLUCOSE ONCE DAILY AS DIRECTED, Disp: , Rfl:     pantoprazole (Protonix) 40 mg EC tablet, Take 1 tablet (40 mg) by mouth 2 times a day before meals., Disp: , Rfl:     pen needle, diabetic (BD Lela 2nd Gen Pen Needle) 32 gauge x 5/32\" needle, Use as directed with insulin pen up to 5 times daily, Disp: 500 each, Rfl: 2    polyethylene glycol (Glycolax, Miralax) 17 gram/dose powder, Mix 17 g of powder and drink 3 times a day as needed for constipation., Disp: , Rfl:     propranolol LA (Inderal LA) 60 mg 24 hr capsule, Take 1 capsule (60 mg) by mouth once daily. Do not crush, chew, or split., Disp: 30 capsule, Rfl: 11    rOPINIRole (Requip) 0.5 mg tablet, Take 1 tablet by mouth (0.5mg) in the morning and 2 tablets (1mg) by mouth in the evening, Disp: , Rfl:     senna 8.6 mg tablet, Take 1 tablet (8.6 mg) by mouth once daily at bedtime., Disp: , Rfl:     tiZANidine (Zanaflex) 2 mg tablet, Take 1 tablet (2 mg) by mouth 2 times a day., Disp: , Rfl:     ubrogepant (Ubrelvy) 100 mg tablet tablet, Take 1 tablet (100 mg) by mouth if needed (migraine). May repeat in 2 hours for max of 200mg per 24 hours., Disp: 16 tablet, Rfl: 3    ZINC ORAL, Take 50 mg by mouth once daily., Disp: , Rfl:     Allergies   Allergen Reactions    Acetazolamide Hives, Rash, Shortness of breath and Swelling     oral hives, redness, ABD pain    Atorvastatin Unknown     Causes muscle pain    Cefdinir Itching, Rash, Shortness of breath and Anaphylaxis     Itchy throat    Throat closing / difficulty breathing.  Took Benadryl at home.    Itchy throat      Throat closing / difficulty breathing.  Took Benadryl at home.    Ceftriaxone Anaphylaxis, Rash, Shortness of breath and Swelling     SOB    SOB hives turned red during gallbladder    Doxepin " "Anaphylaxis, Hives, Swelling, Angioedema and Rash     Itchy sore throat problems with swallowing    facial swelling    Itchy sore throat problems with swallowing      facial swelling    Duloxetine Anaphylaxis, Hallucinations and Rash     suicidal ideation    suicidal ideation     Other reaction(s): suicidal ideation    suicidal    Suicidal ideation    Fd And C Red No.40 Anaphylaxis    Levofloxacin Angioedema, Swelling and Rash     eyelid swelling    eyelid swelling. Patient tolerates Avelox    Levofloxacin In D5w Anaphylaxis     Moon face lips swelling just Levaquin tolerated D5W)    Nutritional Supplements Anaphylaxis     Grapes ( red dye    Ozempic [Semaglutide] Nausea/vomiting     Severe gastroparesis worsening     Prochlorperazine Anaphylaxis     HIGH Compazine involuntary muscle spasms low doses tolerated)    Red Dye Anaphylaxis    Becky Anaphylaxis and Swelling     Becky    SOB facial swelling    Rosemary Oil Anaphylaxis, Itching and Swelling     Becky  SOB facial swelling      SOB facial swelling    Strawberry Shortness of breath     White blisters in mouth      Other reaction(s): white blisters in mouth, shortness of breath    Sulfa (Sulfonamide Antibiotics) Anaphylaxis, Rash, Shortness of breath and Swelling     SOB itchy hives    Other Reaction(s): rash, SOB      SOB itchy hives    Topiramate Anaphylaxis, Swelling and Angioedema     eyelid swelling    Throat swelling    eyelid swelling  Throat swelling      Throat swelling      eyelid swelling    Tree Pollen-Black [place] Shortness of breath     Other Reaction(s): Other: See Comments      White blisters in mouth      blisters in mouth-carries an EPIPEN-\"I have gotten short of breath\"    Tree Pollen-Pecan Shortness of breath     pecans    [place] Shortness of breath    Aripiprazole Unknown, Fever and Other     fever and tremors    Fevers and Tremors    Tremor    Aspartame Diarrhea     Blood sugar [place], Diarrhea    Aspartame (Bulk) Diarrhea, Nausea " Only and Nausea/vomiting     Other Reaction(s): nausea, diarrhea, abdominal pain      Blood sugar [place], Diarrhea    Fenofibrate Other     Double vision    Gluten Itching, Other and Rash     Rashes constipation gastric discomfort    Hydrochlorothiazide Hives, Itching and Rash     hctz removed as free-text allergy and entered as [facility] allergy,1455 10/6/2007JO      itchy hives    Hydromorphone Unknown, GI intolerance and Nausea Only     Intolerance nausea instant    Iron Hives and Rash     ichy hive ( can tolerate ferrous gluconate)    Meclizine Angioedema, Other and Swelling     eyelid swelling    Swelling of the eyelids    Eyelids and face    Metformin Other     Other reaction(s): Dyspepsia, Dyspepsia    Propoxyphene Unknown and Hives     Darvocet itchy hives    Statins-Hmg-Coa Reductase Inhibitors Unknown     Double vision    Tetracyclines Hives, Itching and Rash     sulfa    Rash itching    Itchy  rash    Thiazides Hives, Itching and Rash     itchy hives    Venlafaxine Unknown and Fever     Fevers chills    Wheat Unknown     oral blisters    Adhesive Tape-Silicones Itching and Other    Barbiturates Unknown    Ciprofloxacin Hives    Dhe Unknown     Raynaud's disease    Diet Foods Diarrhea     Aspartame allergy only. Removes to many foods to interface.    Farxiga [Dapagliflozin] Other     Frequent yeast infections and UTI    Ferrous Sulfate Hives    Keflex [Cephalexin] Itching    L.Acidoph-L.Bulg-B.Bif-S.Therm GI intolerance    Milk Containing Products (Dairy) GI Upset    Nsaids (Non-Steroidal Anti-Inflammatory Drug) Unknown and Nausea/vomiting    Other Unknown    Sulfonylureas Unknown    Tobramycin Unknown    Vancomycin Unknown and Hives     Niyah syndrome    Other reaction(s): Other    Red man syndrome if given fast, benadryl with.     Niyah syndrome  Other reaction(s): Other      Other reaction(s): Other      Red man syndrome if given fast, benadryl with.    Adhesive Rash    Azithromycin Hives, Itching and  Rash    Betamethasone Nausea And Vomiting, Other, GI intolerance and Diarrhea     N/V/D    House Dust Rash    Metoclopramide Hcl Other    Prednisone Rash    Propoxyphene-Acetaminophen Hives and Rash    Sulfacetamide Sodium Rash and Swelling    [id number] Antimigraine Agents Nausea Only         None due to Raynaud's  ( Triptans cause a stroke)    Zolpidem Other and Hallucinations     Sleep walk    Other Reaction(s): insomnia and agitation      Sleep walk     Social History     Tobacco Use    Smoking status: Never    Smokeless tobacco: Never   Substance Use Topics    Alcohol use: Not Currently     Comment: Socially in College     Social History     Substance and Sexual Activity   Drug Use Yes    Types: Benzodiazepines      ROS  As noted in HPI, otherwise all other systems have been reviewed are negative for complaint.     Objective   General Appearance: Jonathan is well-developed, well-nourished, 49 y.o. year old female, in no acute distress. Makes good eye contact, is alert, interactive, and cooperative. Demonstrates recent & remote memory recall. Subjective information consistent with objective assessment. *Assessment limited due to virtual visit.    Lab Results   Component Value Date    WBC 5.1 01/31/2025    RBC 3.77 (L) 01/31/2025    HGB 10.1 (L) 01/31/2025    HCT 34.0 (L) 01/31/2025     01/31/2025     01/31/2025    K 4.3 01/31/2025     01/31/2025    BUN 13 01/31/2025    CREATININE 1.19 (H) 01/31/2025    EGFR 57 (L) 01/31/2025    CALCIUM 8.9 01/31/2025    ALKPHOS 95 01/28/2025    AST 16 01/28/2025    ALT 22 01/28/2025    MG 2.09 01/22/2025    ZSHIVVUS48 383 09/17/2024    VITD25 26 (A) 02/23/2023    HGBA1C 7.2 (H) 09/20/2024    LDLCALC 103 (H) 04/18/2024    CHOL 204 (H) 04/18/2024    HDL 46.5 04/18/2024    TRIG 272 (H) 04/18/2024    TSH 1.29 08/28/2024      Neurological Exam  Cranial Nerves  CN III, IV, VI: Extraocular movements intact bilaterally. Normal lids and orbits bilaterally.  CN VII:  Full and symmetric facial movement.  CN VIII: Hearing is normal.  Assessment & Plan  Chronic migraine without aura, intractable, with status migrainosus         Complicated migraine         H/O  shunt revision         NAION (non-arteritic anterior ischemic optic neuropathy), left eye         Seizure disorder (Multi)         Insomnia, unspecified type         Confusion    Orders:    Referral to Adult Neuropsychology; Future       ASSESSMENT/PLAN:  Neuropsychiatry for issues with memory- was impaired, got better for a bit, and worse after each surgery. Cognitive testing.   Would like pre-medicated to prevent migraines prior to IVIG infusions, questioning use of diclofenac or promethazine.  Follow up 6 months    I personally spent 48 minutes today, exclusive of procedures, providing care for this patient, including preparation, face to face time, documentation and other services such as review of medical records, diagnostic result, patient education, counseling, coordination of care as specified in the encounter.     Maria Del Rosario Alatorre, APRN-CNP

## 2025-02-12 ENCOUNTER — APPOINTMENT (OUTPATIENT)
Dept: NEUROSURGERY | Facility: HOSPITAL | Age: 50
End: 2025-02-12
Payer: MEDICAID

## 2025-02-13 NOTE — PATIENT INSTRUCTIONS
Dear Jonathan,    Thank you for coming to [facility]. It was a pleasure caring for you today.  The best way to contact me for medication refills or questions about your care is through [email or web address].  Otherwise, you can contact the office by phone: [phone].    RAUL Yanes-CNP      PLAN:  Neuropsychiatry for issues with memory- was impaired, got better for a bit, and worse after each surgery. Cognitive testing.   You should be prompted set up the appointment in [facility]. However, if easier, you can call the  to assist you with scheduling. [facility] [employer] phone number is [phone].  Would like pre-medicated to prevent migraines prior to IVIG infusions, questioning use of diclofenac or promethazine.  Follow up 6 months

## 2025-02-14 ENCOUNTER — SPECIALTY PHARMACY (OUTPATIENT)
Dept: PHARMACY | Facility: CLINIC | Age: 50
End: 2025-02-14

## 2025-02-14 PROCEDURE — RXMED WILLOW AMBULATORY MEDICATION CHARGE

## 2025-02-15 ENCOUNTER — APPOINTMENT (OUTPATIENT)
Dept: RADIOLOGY | Facility: HOSPITAL | Age: 50
End: 2025-02-15
Payer: MEDICAID

## 2025-02-15 ENCOUNTER — HOSPITAL ENCOUNTER (INPATIENT)
Facility: HOSPITAL | Age: 50
LOS: 2 days | Discharge: HOME | End: 2025-02-18
Attending: EMERGENCY MEDICINE | Admitting: NEUROLOGICAL SURGERY
Payer: MEDICAID

## 2025-02-15 DIAGNOSIS — H53.9 VISION CHANGES: ICD-10-CM

## 2025-02-15 DIAGNOSIS — Z98.2 HISTORY OF BRAIN SHUNT: ICD-10-CM

## 2025-02-15 DIAGNOSIS — G89.29 CHRONIC NONINTRACTABLE HEADACHE, UNSPECIFIED HEADACHE TYPE: Primary | ICD-10-CM

## 2025-02-15 DIAGNOSIS — R51.9 CHRONIC NONINTRACTABLE HEADACHE, UNSPECIFIED HEADACHE TYPE: Primary | ICD-10-CM

## 2025-02-15 DIAGNOSIS — G93.2 IIH (IDIOPATHIC INTRACRANIAL HYPERTENSION): ICD-10-CM

## 2025-02-15 DIAGNOSIS — R11.0 NAUSEA: ICD-10-CM

## 2025-02-15 LAB
ANION GAP SERPL CALC-SCNC: 15 MMOL/L (ref 10–20)
APPEARANCE UR: CLEAR
APTT PPP: 34 SECONDS (ref 27–38)
BASOPHILS # BLD AUTO: 0.02 X10*3/UL (ref 0–0.1)
BASOPHILS NFR BLD AUTO: 0.3 %
BILIRUB UR STRIP.AUTO-MCNC: NEGATIVE MG/DL
BUN SERPL-MCNC: 25 MG/DL (ref 6–23)
CALCIUM SERPL-MCNC: 9.5 MG/DL (ref 8.6–10.6)
CHLORIDE SERPL-SCNC: 100 MMOL/L (ref 98–107)
CO2 SERPL-SCNC: 29 MMOL/L (ref 21–32)
COLOR UR: ABNORMAL
CREAT SERPL-MCNC: 1.03 MG/DL (ref 0.5–1.05)
CRP SERPL-MCNC: 0.16 MG/DL
EGFRCR SERPLBLD CKD-EPI 2021: 68 ML/MIN/1.73M*2
EOSINOPHIL # BLD AUTO: 0 X10*3/UL (ref 0–0.7)
EOSINOPHIL NFR BLD AUTO: 0 %
ERYTHROCYTE [DISTWIDTH] IN BLOOD BY AUTOMATED COUNT: 15.9 % (ref 11.5–14.5)
ERYTHROCYTE [SEDIMENTATION RATE] IN BLOOD BY WESTERGREN METHOD: 31 MM/H (ref 0–20)
FLUAV RNA RESP QL NAA+PROBE: NOT DETECTED
FLUBV RNA RESP QL NAA+PROBE: NOT DETECTED
GLUCOSE SERPL-MCNC: 191 MG/DL (ref 74–99)
GLUCOSE UR STRIP.AUTO-MCNC: NORMAL MG/DL
HCT VFR BLD AUTO: 39.6 % (ref 36–46)
HGB BLD-MCNC: 12.6 G/DL (ref 12–16)
IMM GRANULOCYTES # BLD AUTO: 0.06 X10*3/UL (ref 0–0.7)
IMM GRANULOCYTES NFR BLD AUTO: 0.9 % (ref 0–0.9)
INR PPP: 1 (ref 0.9–1.1)
KETONES UR STRIP.AUTO-MCNC: ABNORMAL MG/DL
LEUKOCYTE ESTERASE UR QL STRIP.AUTO: NEGATIVE
LEVETIRACETAM SERPL-MCNC: 24 UG/ML (ref 10–40)
LYMPHOCYTES # BLD AUTO: 1.13 X10*3/UL (ref 1.2–4.8)
LYMPHOCYTES NFR BLD AUTO: 16.7 %
MAGNESIUM SERPL-MCNC: 2.24 MG/DL (ref 1.6–2.4)
MCH RBC QN AUTO: 26.5 PG (ref 26–34)
MCHC RBC AUTO-ENTMCNC: 31.8 G/DL (ref 32–36)
MCV RBC AUTO: 83 FL (ref 80–100)
MONOCYTES # BLD AUTO: 0.32 X10*3/UL (ref 0.1–1)
MONOCYTES NFR BLD AUTO: 4.7 %
NEUTROPHILS # BLD AUTO: 5.23 X10*3/UL (ref 1.2–7.7)
NEUTROPHILS NFR BLD AUTO: 77.4 %
NITRITE UR QL STRIP.AUTO: NEGATIVE
NRBC BLD-RTO: 0 /100 WBCS (ref 0–0)
PH UR STRIP.AUTO: 7 [PH]
PLATELET # BLD AUTO: 378 X10*3/UL (ref 150–450)
POTASSIUM SERPL-SCNC: 4.3 MMOL/L (ref 3.5–5.3)
PREGNANCY TEST URINE, POC: NEGATIVE
PROT UR STRIP.AUTO-MCNC: NEGATIVE MG/DL
PROTHROMBIN TIME: 11.3 SECONDS (ref 9.8–12.8)
RBC # BLD AUTO: 4.76 X10*6/UL (ref 4–5.2)
RBC # UR STRIP.AUTO: NEGATIVE MG/DL
SARS-COV-2 RNA RESP QL NAA+PROBE: NOT DETECTED
SODIUM SERPL-SCNC: 140 MMOL/L (ref 136–145)
SP GR UR STRIP.AUTO: 1.03
UROBILINOGEN UR STRIP.AUTO-MCNC: NORMAL MG/DL
WBC # BLD AUTO: 6.8 X10*3/UL (ref 4.4–11.3)

## 2025-02-15 PROCEDURE — 81025 URINE PREGNANCY TEST: CPT

## 2025-02-15 PROCEDURE — 2500000005 HC RX 250 GENERAL PHARMACY W/O HCPCS: Performed by: EMERGENCY MEDICINE

## 2025-02-15 PROCEDURE — 2500000004 HC RX 250 GENERAL PHARMACY W/ HCPCS (ALT 636 FOR OP/ED): Performed by: EMERGENCY MEDICINE

## 2025-02-15 PROCEDURE — 96367 TX/PROPH/DG ADDL SEQ IV INF: CPT

## 2025-02-15 PROCEDURE — 85652 RBC SED RATE AUTOMATED: CPT | Performed by: EMERGENCY MEDICINE

## 2025-02-15 PROCEDURE — 71045 X-RAY EXAM CHEST 1 VIEW: CPT | Performed by: STUDENT IN AN ORGANIZED HEALTH CARE EDUCATION/TRAINING PROGRAM

## 2025-02-15 PROCEDURE — 74018 RADEX ABDOMEN 1 VIEW: CPT | Performed by: STUDENT IN AN ORGANIZED HEALTH CARE EDUCATION/TRAINING PROGRAM

## 2025-02-15 PROCEDURE — 83735 ASSAY OF MAGNESIUM: CPT | Performed by: EMERGENCY MEDICINE

## 2025-02-15 PROCEDURE — 80048 BASIC METABOLIC PNL TOTAL CA: CPT | Performed by: EMERGENCY MEDICINE

## 2025-02-15 PROCEDURE — 2500000004 HC RX 250 GENERAL PHARMACY W/ HCPCS (ALT 636 FOR OP/ED)

## 2025-02-15 PROCEDURE — 86850 RBC ANTIBODY SCREEN: CPT

## 2025-02-15 PROCEDURE — 87636 SARSCOV2 & INF A&B AMP PRB: CPT | Performed by: EMERGENCY MEDICINE

## 2025-02-15 PROCEDURE — 81003 URINALYSIS AUTO W/O SCOPE: CPT

## 2025-02-15 PROCEDURE — 80177 DRUG SCRN QUAN LEVETIRACETAM: CPT

## 2025-02-15 PROCEDURE — 70250 X-RAY EXAM OF SKULL: CPT | Performed by: STUDENT IN AN ORGANIZED HEALTH CARE EDUCATION/TRAINING PROGRAM

## 2025-02-15 PROCEDURE — 96365 THER/PROPH/DIAG IV INF INIT: CPT

## 2025-02-15 PROCEDURE — 70450 CT HEAD/BRAIN W/O DYE: CPT | Performed by: STUDENT IN AN ORGANIZED HEALTH CARE EDUCATION/TRAINING PROGRAM

## 2025-02-15 PROCEDURE — 96375 TX/PRO/DX INJ NEW DRUG ADDON: CPT

## 2025-02-15 PROCEDURE — 84145 PROCALCITONIN (PCT): CPT

## 2025-02-15 PROCEDURE — 71045 X-RAY EXAM CHEST 1 VIEW: CPT

## 2025-02-15 PROCEDURE — 96366 THER/PROPH/DIAG IV INF ADDON: CPT

## 2025-02-15 PROCEDURE — 86901 BLOOD TYPING SEROLOGIC RH(D): CPT

## 2025-02-15 PROCEDURE — 99285 EMERGENCY DEPT VISIT HI MDM: CPT | Mod: 25 | Performed by: EMERGENCY MEDICINE

## 2025-02-15 PROCEDURE — 85610 PROTHROMBIN TIME: CPT

## 2025-02-15 PROCEDURE — 86140 C-REACTIVE PROTEIN: CPT | Performed by: EMERGENCY MEDICINE

## 2025-02-15 PROCEDURE — 99285 EMERGENCY DEPT VISIT HI MDM: CPT | Performed by: EMERGENCY MEDICINE

## 2025-02-15 PROCEDURE — 70450 CT HEAD/BRAIN W/O DYE: CPT

## 2025-02-15 PROCEDURE — 80235 DRUG ASSAY LACOSAMIDE: CPT

## 2025-02-15 PROCEDURE — 85025 COMPLETE CBC W/AUTO DIFF WBC: CPT | Performed by: EMERGENCY MEDICINE

## 2025-02-15 RX ORDER — KETOROLAC TROMETHAMINE 30 MG/ML
30 INJECTION, SOLUTION INTRAMUSCULAR; INTRAVENOUS ONCE
Status: DISCONTINUED | OUTPATIENT
Start: 2025-02-15 | End: 2025-02-15

## 2025-02-15 RX ORDER — ONDANSETRON HYDROCHLORIDE 2 MG/ML
4 INJECTION, SOLUTION INTRAVENOUS ONCE
Status: COMPLETED | OUTPATIENT
Start: 2025-02-15 | End: 2025-02-15

## 2025-02-15 RX ORDER — KETOROLAC TROMETHAMINE 15 MG/ML
15 INJECTION, SOLUTION INTRAMUSCULAR; INTRAVENOUS ONCE
Status: COMPLETED | OUTPATIENT
Start: 2025-02-15 | End: 2025-02-15

## 2025-02-15 RX ORDER — ACETAMINOPHEN 10 MG/ML
1000 INJECTION, SOLUTION INTRAVENOUS ONCE
Status: COMPLETED | OUTPATIENT
Start: 2025-02-15 | End: 2025-02-15

## 2025-02-15 RX ORDER — MAGNESIUM SULFATE HEPTAHYDRATE 40 MG/ML
2 INJECTION, SOLUTION INTRAVENOUS ONCE
Status: COMPLETED | OUTPATIENT
Start: 2025-02-15 | End: 2025-02-16

## 2025-02-15 RX ADMIN — MAGNESIUM SULFATE HEPTAHYDRATE 2 G: 40 INJECTION, SOLUTION INTRAVENOUS at 21:42

## 2025-02-15 RX ADMIN — KETOROLAC TROMETHAMINE 15 MG: 15 INJECTION, SOLUTION INTRAMUSCULAR; INTRAVENOUS at 22:53

## 2025-02-15 RX ADMIN — DEXTROSE MONOHYDRATE 1000 MG: 50 INJECTION, SOLUTION INTRAVENOUS at 20:38

## 2025-02-15 RX ADMIN — ACETAMINOPHEN 1000 MG: 1000 INJECTION, SOLUTION INTRAVENOUS at 20:05

## 2025-02-15 RX ADMIN — ONDANSETRON 4 MG: 2 INJECTION INTRAMUSCULAR; INTRAVENOUS at 20:09

## 2025-02-15 RX ADMIN — SODIUM CHLORIDE 1000 ML: 9 INJECTION, SOLUTION INTRAVENOUS at 20:04

## 2025-02-15 ASSESSMENT — PAIN - FUNCTIONAL ASSESSMENT: PAIN_FUNCTIONAL_ASSESSMENT: 0-10

## 2025-02-15 ASSESSMENT — PAIN DESCRIPTION - LOCATION: LOCATION: HEAD

## 2025-02-15 ASSESSMENT — LIFESTYLE VARIABLES
TOTAL SCORE: 0
EVER HAD A DRINK FIRST THING IN THE MORNING TO STEADY YOUR NERVES TO GET RID OF A HANGOVER: NO
HAVE PEOPLE ANNOYED YOU BY CRITICIZING YOUR DRINKING: NO
HAVE YOU EVER FELT YOU SHOULD CUT DOWN ON YOUR DRINKING: NO
EVER FELT BAD OR GUILTY ABOUT YOUR DRINKING: NO

## 2025-02-15 ASSESSMENT — PAIN SCALES - GENERAL
PAINLEVEL_OUTOF10: 4
PAINLEVEL_OUTOF10: 7

## 2025-02-15 NOTE — ED TRIAGE NOTES
Pt came in for headache and nausea, hx of shunt placement in 09/24. Had shunt replaced x 3 weeks ago.

## 2025-02-16 ENCOUNTER — APPOINTMENT (OUTPATIENT)
Dept: RADIOLOGY | Facility: HOSPITAL | Age: 50
End: 2025-02-16
Payer: MEDICAID

## 2025-02-16 PROBLEM — R51.9 CHRONIC NONINTRACTABLE HEADACHE, UNSPECIFIED HEADACHE TYPE: Status: ACTIVE | Noted: 2025-02-16

## 2025-02-16 PROBLEM — G89.29 CHRONIC NONINTRACTABLE HEADACHE, UNSPECIFIED HEADACHE TYPE: Status: ACTIVE | Noted: 2025-02-16

## 2025-02-16 LAB
ABO GROUP (TYPE) IN BLOOD: NORMAL
ANTIBODY SCREEN: NORMAL
APPEARANCE CSF: ABNORMAL
APPEARANCE CSF: ABNORMAL
BASOPHILS NFR CSF MANUAL: 0 %
BASOPHILS NFR CSF MANUAL: 0 %
BLASTS CSF MANUAL: 0 %
BLASTS CSF MANUAL: 0 %
COLOR CSF: ABNORMAL
COLOR CSF: ABNORMAL
COLOR SPUN CSF: COLORLESS
COLOR SPUN CSF: COLORLESS
EOSINOPHIL NFR CSF MANUAL: 0 %
EOSINOPHIL NFR CSF MANUAL: 1 %
GLUCOSE BLD MANUAL STRIP-MCNC: 130 MG/DL (ref 74–99)
GLUCOSE BLD MANUAL STRIP-MCNC: 144 MG/DL (ref 74–99)
GLUCOSE BLD MANUAL STRIP-MCNC: 172 MG/DL (ref 74–99)
GLUCOSE BLD MANUAL STRIP-MCNC: 56 MG/DL (ref 74–99)
GLUCOSE CSF-MCNC: 87 MG/DL (ref 40–70)
HOLD SPECIMEN: NORMAL
IMM GRANULOCYTES NFR CSF: 0 %
IMM GRANULOCYTES NFR CSF: 0 %
LYMPHOCYTES NFR CSF MANUAL: 26 % (ref 28–96)
LYMPHOCYTES NFR CSF MANUAL: 33 % (ref 28–96)
MONOS+MACROS NFR CSF MANUAL: 10 % (ref 16–56)
MONOS+MACROS NFR CSF MANUAL: 8 % (ref 16–56)
NEUTS SEG NFR CSF MANUAL: 57 % (ref 0–5)
NEUTS SEG NFR CSF MANUAL: 65 % (ref 0–5)
OTHER CELLS NFR CSF MANUAL: 0 %
OTHER CELLS NFR CSF MANUAL: 0 %
PLASMA CELLS NFR CSF MICRO: 0 %
PLASMA CELLS NFR CSF MICRO: 0 %
PROCALCITONIN SERPL-MCNC: 0.16 NG/ML
PROT CSF-MCNC: 143 MG/DL (ref 15–45)
RBC # CSF AUTO: ABNORMAL /UL (ref 0–5)
RBC # CSF AUTO: ABNORMAL /UL (ref 0–5)
RH FACTOR (ANTIGEN D): NORMAL
TOTAL CELLS COUNTED CSF: 100
TOTAL CELLS COUNTED CSF: 30
TUBE # CSF: ABNORMAL
TUBE # CSF: ABNORMAL
WBC # CSF AUTO: 10 /UL (ref 1–5)
WBC # CSF AUTO: 17 /UL (ref 1–5)

## 2025-02-16 PROCEDURE — 62270 DX LMBR SPI PNXR: CPT

## 2025-02-16 PROCEDURE — 87070 CULTURE OTHR SPECIMN AEROBIC: CPT

## 2025-02-16 PROCEDURE — 71260 CT THORAX DX C+: CPT | Performed by: STUDENT IN AN ORGANIZED HEALTH CARE EDUCATION/TRAINING PROGRAM

## 2025-02-16 PROCEDURE — 2500000004 HC RX 250 GENERAL PHARMACY W/ HCPCS (ALT 636 FOR OP/ED): Performed by: NEUROLOGICAL SURGERY

## 2025-02-16 PROCEDURE — 62270 DX LMBR SPI PNXR: CPT | Mod: GC

## 2025-02-16 PROCEDURE — 2500000004 HC RX 250 GENERAL PHARMACY W/ HCPCS (ALT 636 FOR OP/ED)

## 2025-02-16 PROCEDURE — 74177 CT ABD & PELVIS W/CONTRAST: CPT

## 2025-02-16 PROCEDURE — 82945 GLUCOSE OTHER FLUID: CPT

## 2025-02-16 PROCEDURE — 84157 ASSAY OF PROTEIN OTHER: CPT

## 2025-02-16 PROCEDURE — 96366 THER/PROPH/DIAG IV INF ADDON: CPT | Mod: 59

## 2025-02-16 PROCEDURE — 2500000002 HC RX 250 W HCPCS SELF ADMINISTERED DRUGS (ALT 637 FOR MEDICARE OP, ALT 636 FOR OP/ED)

## 2025-02-16 PROCEDURE — 1210000001 HC SEMI-PRIVATE ROOM DAILY

## 2025-02-16 PROCEDURE — 2500000001 HC RX 250 WO HCPCS SELF ADMINISTERED DRUGS (ALT 637 FOR MEDICARE OP)

## 2025-02-16 PROCEDURE — 96376 TX/PRO/DX INJ SAME DRUG ADON: CPT | Mod: 59

## 2025-02-16 PROCEDURE — 96375 TX/PRO/DX INJ NEW DRUG ADDON: CPT | Mod: 59

## 2025-02-16 PROCEDURE — 82947 ASSAY GLUCOSE BLOOD QUANT: CPT

## 2025-02-16 PROCEDURE — 2500000005 HC RX 250 GENERAL PHARMACY W/O HCPCS

## 2025-02-16 PROCEDURE — 87075 CULTR BACTERIA EXCEPT BLOOD: CPT

## 2025-02-16 PROCEDURE — 74177 CT ABD & PELVIS W/CONTRAST: CPT | Performed by: STUDENT IN AN ORGANIZED HEALTH CARE EDUCATION/TRAINING PROGRAM

## 2025-02-16 PROCEDURE — 89051 BODY FLUID CELL COUNT: CPT

## 2025-02-16 PROCEDURE — 009U3ZX DRAINAGE OF SPINAL CANAL, PERCUTANEOUS APPROACH, DIAGNOSTIC: ICD-10-PCS

## 2025-02-16 PROCEDURE — 2550000001 HC RX 255 CONTRASTS: Performed by: EMERGENCY MEDICINE

## 2025-02-16 RX ORDER — KETOROLAC TROMETHAMINE 30 MG/ML
30 INJECTION, SOLUTION INTRAMUSCULAR; INTRAVENOUS EVERY 6 HOURS PRN
Status: DISCONTINUED | OUTPATIENT
Start: 2025-02-16 | End: 2025-02-18 | Stop reason: HOSPADM

## 2025-02-16 RX ORDER — FUROSEMIDE 20 MG/1
20 TABLET ORAL 2 TIMES DAILY
Status: DISCONTINUED | OUTPATIENT
Start: 2025-02-16 | End: 2025-02-18 | Stop reason: HOSPADM

## 2025-02-16 RX ORDER — ONDANSETRON 4 MG/1
4 TABLET, FILM COATED ORAL EVERY 8 HOURS PRN
Status: DISCONTINUED | OUTPATIENT
Start: 2025-02-16 | End: 2025-02-17

## 2025-02-16 RX ORDER — PROPRANOLOL HYDROCHLORIDE 60 MG/1
60 CAPSULE, EXTENDED RELEASE ORAL DAILY
Status: DISCONTINUED | OUTPATIENT
Start: 2025-02-17 | End: 2025-02-18 | Stop reason: HOSPADM

## 2025-02-16 RX ORDER — LEVOTHYROXINE SODIUM 75 UG/1
75 TABLET ORAL
Status: DISCONTINUED | OUTPATIENT
Start: 2025-02-17 | End: 2025-02-18 | Stop reason: HOSPADM

## 2025-02-16 RX ORDER — DIPHENHYDRAMINE HCL 25 MG
25 CAPSULE ORAL ONCE
Status: DISCONTINUED | OUTPATIENT
Start: 2025-02-16 | End: 2025-02-16

## 2025-02-16 RX ORDER — DIPHENHYDRAMINE HYDROCHLORIDE 50 MG/ML
10 INJECTION INTRAMUSCULAR; INTRAVENOUS ONCE
Status: COMPLETED | OUTPATIENT
Start: 2025-02-16 | End: 2025-02-16

## 2025-02-16 RX ORDER — DEXTROSE 50 % IN WATER (D50W) INTRAVENOUS SYRINGE
Status: COMPLETED
Start: 2025-02-16 | End: 2025-02-16

## 2025-02-16 RX ORDER — EZETIMIBE 10 MG/1
10 TABLET ORAL DAILY
Status: DISCONTINUED | OUTPATIENT
Start: 2025-02-16 | End: 2025-02-18 | Stop reason: HOSPADM

## 2025-02-16 RX ORDER — MONTELUKAST SODIUM 10 MG/1
10 TABLET ORAL NIGHTLY
Status: DISCONTINUED | OUTPATIENT
Start: 2025-02-16 | End: 2025-02-18 | Stop reason: HOSPADM

## 2025-02-16 RX ORDER — POLYETHYLENE GLYCOL 3350 17 G/17G
17 POWDER, FOR SOLUTION ORAL DAILY
Status: DISCONTINUED | OUTPATIENT
Start: 2025-02-16 | End: 2025-02-18 | Stop reason: HOSPADM

## 2025-02-16 RX ORDER — ROPINIROLE 1 MG/1
1 TABLET, FILM COATED ORAL EVERY EVENING
Status: DISCONTINUED | OUTPATIENT
Start: 2025-02-16 | End: 2025-02-18 | Stop reason: HOSPADM

## 2025-02-16 RX ORDER — HYDROCORTISONE 10 MG/1
10 TABLET ORAL 2 TIMES DAILY
Status: DISCONTINUED | OUTPATIENT
Start: 2025-02-16 | End: 2025-02-17

## 2025-02-16 RX ORDER — LORAZEPAM 2 MG/ML
2 INJECTION INTRAMUSCULAR ONCE
Status: COMPLETED | OUTPATIENT
Start: 2025-02-16 | End: 2025-02-16

## 2025-02-16 RX ORDER — HEPARIN SODIUM 5000 [USP'U]/ML
7500 INJECTION, SOLUTION INTRAVENOUS; SUBCUTANEOUS EVERY 8 HOURS SCHEDULED
Status: DISCONTINUED | OUTPATIENT
Start: 2025-02-16 | End: 2025-02-18 | Stop reason: HOSPADM

## 2025-02-16 RX ORDER — ONDANSETRON HYDROCHLORIDE 2 MG/ML
4 INJECTION, SOLUTION INTRAVENOUS ONCE
Status: COMPLETED | OUTPATIENT
Start: 2025-02-16 | End: 2025-02-16

## 2025-02-16 RX ORDER — ROPINIROLE 0.5 MG/1
0.5 TABLET, FILM COATED ORAL EVERY MORNING
Status: DISCONTINUED | OUTPATIENT
Start: 2025-02-17 | End: 2025-02-18 | Stop reason: HOSPADM

## 2025-02-16 RX ORDER — DOXYLAMINE SUCCINATE 25 MG
1 TABLET ORAL DAILY PRN
Status: DISCONTINUED | OUTPATIENT
Start: 2025-02-16 | End: 2025-02-18 | Stop reason: HOSPADM

## 2025-02-16 RX ORDER — DEXTROSE 50 % IN WATER (D50W) INTRAVENOUS SYRINGE
25
Status: DISCONTINUED | OUTPATIENT
Start: 2025-02-16 | End: 2025-02-18 | Stop reason: HOSPADM

## 2025-02-16 RX ORDER — AZELASTINE 1 MG/ML
1 SPRAY, METERED NASAL 2 TIMES DAILY
Status: DISCONTINUED | OUTPATIENT
Start: 2025-02-16 | End: 2025-02-18 | Stop reason: HOSPADM

## 2025-02-16 RX ORDER — DEXTROSE 50 % IN WATER (D50W) INTRAVENOUS SYRINGE
12.5
Status: DISCONTINUED | OUTPATIENT
Start: 2025-02-16 | End: 2025-02-18 | Stop reason: HOSPADM

## 2025-02-16 RX ORDER — CLONAZEPAM 0.5 MG/1
0.5 TABLET ORAL 2 TIMES DAILY
Status: DISCONTINUED | OUTPATIENT
Start: 2025-02-16 | End: 2025-02-18 | Stop reason: HOSPADM

## 2025-02-16 RX ORDER — AMITRIPTYLINE HYDROCHLORIDE 25 MG/1
100 TABLET, FILM COATED ORAL NIGHTLY
Status: DISCONTINUED | OUTPATIENT
Start: 2025-02-16 | End: 2025-02-18 | Stop reason: HOSPADM

## 2025-02-16 RX ORDER — ONDANSETRON HYDROCHLORIDE 2 MG/ML
4 INJECTION, SOLUTION INTRAVENOUS EVERY 8 HOURS PRN
Status: DISCONTINUED | OUTPATIENT
Start: 2025-02-16 | End: 2025-02-17

## 2025-02-16 RX ORDER — MIDAZOLAM HYDROCHLORIDE 1 MG/ML
2 INJECTION INTRAMUSCULAR; INTRAVENOUS ONCE
Status: DISCONTINUED | OUTPATIENT
Start: 2025-02-16 | End: 2025-02-16

## 2025-02-16 RX ORDER — DOXYLAMINE SUCCINATE 25 MG
TABLET ORAL 2 TIMES DAILY
Status: DISCONTINUED | OUTPATIENT
Start: 2025-02-16 | End: 2025-02-18 | Stop reason: HOSPADM

## 2025-02-16 RX ORDER — FLUTICASONE FUROATE AND VILANTEROL 200; 25 UG/1; UG/1
1 POWDER RESPIRATORY (INHALATION)
Status: DISCONTINUED | OUTPATIENT
Start: 2025-02-16 | End: 2025-02-18 | Stop reason: HOSPADM

## 2025-02-16 RX ORDER — PANTOPRAZOLE SODIUM 40 MG/1
40 TABLET, DELAYED RELEASE ORAL
Status: DISCONTINUED | OUTPATIENT
Start: 2025-02-16 | End: 2025-02-18 | Stop reason: HOSPADM

## 2025-02-16 RX ORDER — LACOSAMIDE 100 MG/1
300 TABLET ORAL 2 TIMES DAILY
Status: DISCONTINUED | OUTPATIENT
Start: 2025-02-16 | End: 2025-02-18 | Stop reason: HOSPADM

## 2025-02-16 RX ORDER — DIVALPROEX SODIUM 500 MG/1
500 TABLET, FILM COATED, EXTENDED RELEASE ORAL 2 TIMES DAILY
Status: DISCONTINUED | OUTPATIENT
Start: 2025-02-16 | End: 2025-02-18 | Stop reason: HOSPADM

## 2025-02-16 RX ORDER — TIZANIDINE 4 MG/1
2 TABLET ORAL 2 TIMES DAILY
Status: DISCONTINUED | OUTPATIENT
Start: 2025-02-16 | End: 2025-02-18 | Stop reason: HOSPADM

## 2025-02-16 RX ORDER — DIPHENHYDRAMINE HYDROCHLORIDE 50 MG/ML
INJECTION INTRAMUSCULAR; INTRAVENOUS
Status: COMPLETED
Start: 2025-02-16 | End: 2025-02-16

## 2025-02-16 RX ORDER — INSULIN LISPRO 100 [IU]/ML
0-5 INJECTION, SOLUTION INTRAVENOUS; SUBCUTANEOUS
Status: DISCONTINUED | OUTPATIENT
Start: 2025-02-16 | End: 2025-02-18 | Stop reason: HOSPADM

## 2025-02-16 RX ORDER — FOLIC ACID 1 MG/1
1 TABLET ORAL DAILY
Status: DISCONTINUED | OUTPATIENT
Start: 2025-02-16 | End: 2025-02-18 | Stop reason: HOSPADM

## 2025-02-16 RX ORDER — LEVETIRACETAM 250 MG/1
1500 TABLET ORAL 2 TIMES DAILY
Status: DISCONTINUED | OUTPATIENT
Start: 2025-02-16 | End: 2025-02-18 | Stop reason: HOSPADM

## 2025-02-16 RX ORDER — IPRATROPIUM BROMIDE AND ALBUTEROL SULFATE 2.5; .5 MG/3ML; MG/3ML
3 SOLUTION RESPIRATORY (INHALATION) 4 TIMES DAILY PRN
Status: DISCONTINUED | OUTPATIENT
Start: 2025-02-16 | End: 2025-02-18 | Stop reason: HOSPADM

## 2025-02-16 RX ORDER — FLUCONAZOLE 150 MG/1
150 TABLET ORAL DAILY
Status: DISCONTINUED | OUTPATIENT
Start: 2025-02-16 | End: 2025-02-17

## 2025-02-16 RX ADMIN — CLONAZEPAM 0.5 MG: 0.5 TABLET ORAL at 22:43

## 2025-02-16 RX ADMIN — AZELASTINE HYDROCHLORIDE 1 SPRAY: 137 SPRAY, METERED NASAL at 22:43

## 2025-02-16 RX ADMIN — DIPHENHYDRAMINE HYDROCHLORIDE 10 MG: 50 INJECTION INTRAMUSCULAR; INTRAVENOUS at 18:48

## 2025-02-16 RX ADMIN — LEVETIRACETAM 1500 MG: 500 TABLET, FILM COATED ORAL at 17:13

## 2025-02-16 RX ADMIN — MICONAZOLE NITRATE: 20 CREAM TOPICAL at 23:10

## 2025-02-16 RX ADMIN — INSULIN LISPRO 1 UNITS: 100 INJECTION, SOLUTION INTRAVENOUS; SUBCUTANEOUS at 17:55

## 2025-02-16 RX ADMIN — TIZANIDINE 2 MG: 4 TABLET ORAL at 22:41

## 2025-02-16 RX ADMIN — DIVALPROEX SODIUM 500 MG: 500 TABLET, FILM COATED, EXTENDED RELEASE ORAL at 17:13

## 2025-02-16 RX ADMIN — LACOSAMIDE 300 MG: 100 TABLET, FILM COATED ORAL at 17:13

## 2025-02-16 RX ADMIN — DEXTROSE MONOHYDRATE 50 ML: 25 INJECTION, SOLUTION INTRAVENOUS at 06:20

## 2025-02-16 RX ADMIN — FUROSEMIDE 20 MG: 40 TABLET ORAL at 17:12

## 2025-02-16 RX ADMIN — ROPINIROLE HYDROCHLORIDE 1 MG: 1 TABLET, FILM COATED ORAL at 23:08

## 2025-02-16 RX ADMIN — LORAZEPAM 2 MG: 2 INJECTION INTRAMUSCULAR; INTRAVENOUS at 01:54

## 2025-02-16 RX ADMIN — SODIUM CHLORIDE 1000 ML: 9 INJECTION, SOLUTION INTRAVENOUS at 17:55

## 2025-02-16 RX ADMIN — LACOSAMIDE 300 MG: 100 TABLET, FILM COATED ORAL at 23:07

## 2025-02-16 RX ADMIN — TIZANIDINE 2 MG: 4 TABLET ORAL at 17:12

## 2025-02-16 RX ADMIN — PANTOPRAZOLE SODIUM 40 MG: 40 TABLET, DELAYED RELEASE ORAL at 17:12

## 2025-02-16 RX ADMIN — IOHEXOL 80 ML: 350 INJECTION, SOLUTION INTRAVENOUS at 00:09

## 2025-02-16 RX ADMIN — FOLIC ACID 1 MG: 1 TABLET ORAL at 17:13

## 2025-02-16 RX ADMIN — DIVALPROEX SODIUM 500 MG: 500 TABLET, FILM COATED, EXTENDED RELEASE ORAL at 23:05

## 2025-02-16 RX ADMIN — EZETIMIBE 10 MG: 10 TABLET ORAL at 17:12

## 2025-02-16 RX ADMIN — AMITRIPTYLINE HYDROCHLORIDE 100 MG: 25 TABLET, FILM COATED ORAL at 22:42

## 2025-02-16 RX ADMIN — ONDANSETRON 4 MG: 2 INJECTION INTRAMUSCULAR; INTRAVENOUS at 07:52

## 2025-02-16 RX ADMIN — LEVETIRACETAM 1500 MG: 500 TABLET, FILM COATED ORAL at 23:08

## 2025-02-16 RX ADMIN — MONTELUKAST 10 MG: 10 TABLET, FILM COATED ORAL at 23:09

## 2025-02-16 RX ADMIN — HEPARIN SODIUM 7500 UNITS: 5000 INJECTION, SOLUTION INTRAVENOUS; SUBCUTANEOUS at 23:10

## 2025-02-16 RX ADMIN — HYDROCORTISONE 10 MG: 10 TABLET ORAL at 23:07

## 2025-02-16 RX ADMIN — FUROSEMIDE 20 MG: 40 TABLET ORAL at 23:06

## 2025-02-16 ASSESSMENT — PAIN SCALES - GENERAL: PAINLEVEL_OUTOF10: 0 - NO PAIN

## 2025-02-16 NOTE — H&P
History Of Present Illness  Jonathan is a 49 y.o. female with No Principal Problem: There is no principal problem currently on the Problem List. Please update the Problem List and refresh.     Patient reported that for about two days now she's been having worsening headaches, vision changes (floaters), chills and sweats, pain at her incision site, difficulty turning her head because she feels like it catches on her neck. Otherwise also endorses progressively worsened tremors and instability for a longer amount of time.      Patient also reported that her primary care doctor started her on moxifloxacin for concern for pneumonia for her prolonged cough.  Otherwise, patient denied any weakness, numbness or tingling. No recent falls. Has been having normal bowel movements.     Past Medical History  She has a past medical history of Abnormal findings on diagnostic imaging of other abdominal regions, including retroperitoneum (10/14/2020), Acquired deformity of nose (03/24/2022), Acute upper respiratory infection, unspecified (10/16/2019), Adrenal disease (Multi), Allergic, Allergy status to unspecified drugs, medicaments and biological substances (05/22/2020), Allergy status to unspecified drugs, medicaments and biological substances (11/13/2020), Anemia, Anxiety (2005), Asthma, Benign intracranial hypertension (01/27/2022), Bipolar disorder, unspecified (Multi), Breast calcification, right (08/21/2018), Cellulitis of abdominal wall (09/28/2022), Cervicalgia (07/01/2020), Chronic maxillary sinusitis (01/04/2022), Chronic sialoadenitis (03/16/2020), COVID-19 (01/06/2022), Decreased white blood cell count, unspecified (11/04/2019), Disease of thyroid gland, Disturbances of salivary secretion (03/16/2020), Dry eye syndrome of bilateral lacrimal glands (10/07/2022), Encounter for preprocedural cardiovascular examination (02/01/2022), Food additives allergy status (06/11/2020), Fracture of nasal bones, initial encounter for  closed fracture (03/03/2022), GERD (gastroesophageal reflux disease) (15 years old), Granuloma of right orbit (10/07/2021), History of endometrial ablation (11/09/2017), Hyperlipidemia, Hypertension, Hyperthyroidism, Hypoglycemia, Hypothyroidism, Immunocompromised, Localized swelling, mass and lump, head (03/24/2022), Major depressive disorder, recurrent, in full remission (CMS-HCC) (10/07/2021), 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), Meningitis ([facility]) (2008), Migraine, Nipple discharge (08/24/2022), Ocular pain, right eye (10/07/2022), Optic atrophy, Other abnormal and inconclusive findings on diagnostic imaging of breast (07/06/2020), Other anomalies of pupillary function (05/31/2019), Other chest pain (05/18/2020), Other conditions influencing health status (08/01/2022), Other conditions influencing health status (08/03/2021), Other specified disorders of eustachian tube, left ear (11/18/2019), Other specified disorders of nose and nasal sinuses (03/24/2022), Other specified disorders of nose and nasal sinuses (03/24/2022), Pelvic and perineal pain (07/06/2020), Personal history of other diseases of the circulatory system (04/07/2020), Personal history of other diseases of the circulatory system (04/07/2020), Personal history of other diseases of the circulatory system, Personal history of other diseases of the digestive system, Personal history of other diseases of the digestive system (03/02/2020), Personal history of other diseases of the musculoskeletal system and connective tissue (01/19/2022), Personal history of other diseases of the musculoskeletal system and connective tissue (03/02/2021), Personal history of other diseases of the musculoskeletal system and connective tissue (06/16/2020), Personal history of other diseases of the nervous system and sense organs (11/18/2019), Personal history of other diseases of the nervous system and sense organs (09/21/2022), Personal history of other  diseases of the respiratory system (04/14/2021), Personal history of other endocrine, nutritional and metabolic disease (02/17/2021), Personal history of other mental and behavioral disorders (05/27/2021), Personal history of other specified conditions (09/07/2022), Personal history of other specified conditions (10/16/2019), Personal history of other specified conditions (09/28/2022), Personal history of other specified conditions (09/16/2021), Personal history of other specified conditions (02/01/2022), Personal history of other specified conditions (03/09/2022), Personal history of other specified conditions (02/12/2014), Personal history of other specified conditions (10/27/2021), Personal history of other specified conditions (10/16/2019), Personal history of other specified conditions (02/26/2021), Personal history of other specified conditions (02/22/2021), Personal history of urinary calculi, Polycystic ovary syndrome, Postural orthostatic tachycardia syndrome (POTS), Rash and other nonspecific skin eruption (03/15/2022), Repeated falls (06/23/2021), Right lower quadrant pain (10/14/2020), Seizures (Multi), Sjogren syndrome, unspecified (Multi), Sjogren's syndrome, Sleep apnea, Slow transit constipation (07/09/2020), Subarachnoid hemorrhage, traumatic (Multi) (04/19/2023), Thyroid nodule, Traumatic subarachnoid hemorrhage with loss of consciousness of unspecified duration, subsequent encounter (03/15/2022), Traumatic subarachnoid hemorrhage without loss of consciousness, subsequent encounter, Type 2 diabetes mellitus, Unspecified disorder of refraction (10/07/2022), Unspecified optic neuritis (11/06/2020), Unspecified optic neuritis (11/06/2020), Unspecified visual loss (09/25/2019), Varicella (As a child), Venous insufficiency (chronic) (peripheral) (10/18/2021), Viral infection, unspecified (01/11/2022), Vitamin D deficiency, and Vitamin D deficiency, unspecified (09/28/2022).    Surgical History  She has  a past surgical history that includes Other surgical history (08/22/2019); Other surgical history (08/22/2019); Other surgical history (08/22/2019); Other surgical history (08/22/2019); Other surgical history (08/22/2019); Other surgical history (08/22/2019); MR angio neck wo IV contrast (02/08/2021); MR angio head wo IV contrast (02/08/2021); [place] tooth extraction (2004); Appendectomy (2017); Hysterectomy (2017); Hernia repair (Right, 03/01/2024); Cardiac catheterization; Cholecystectomy; Fracture surgery (12/2023); Endometrial ablation; and Brain surgery ([place] placement to measure pressure).     Social History  She reports that she has never smoked. She has never used smokeless tobacco. She reports that she does not currently use alcohol. She reports current drug use. Drug: Benzodiazepines.     Allergies  Acetazolamide, Atorvastatin, Cefdinir, Ceftriaxone, Doxepin, Duloxetine, Fd and c red no.40, Levofloxacin, Levofloxacin in d5w, Nutritional supplements, Ozempic [semaglutide], Prochlorperazine, Red dye, Rosemary, Rosemary oil, Strawberry, Sulfa (sulfonamide antibiotics), Topiramate, Tree pollen-black walnut, Tree pollen-pecan, Arley, Aripiprazole, Aspartame, Aspartame (bulk), Fenofibrate, Gluten, Hydrochlorothiazide, Hydromorphone, Iron, Meclizine, Metformin, Propoxyphene, Statins-hmg-coa reductase inhibitors, Tetracyclines, Thiazides, Venlafaxine, Wheat, Adhesive tape-silicones, Barbiturates, Ciprofloxacin, Dhe, Diet foods, Farxiga [dapagliflozin], Ferrous sulfate, Keflex [cephalexin], L.acidoph-l.bulg-b.bif-s.therm, Milk containing products (dairy), Nsaids (non-steroidal anti-inflammatory drug), Other, Sulfonylureas, Tobramycin, Vancomycin, Adhesive, Azithromycin, Betamethasone, House dust, Metoclopramide hcl, Prednisone, Propoxyphene-acetaminophen, Sulfacetamide sodium, Hzkmjoqq-3-oo9 antimigraine agents, and Zolpidem    Medications  (Not in a hospital admission)      Review of Systems   10 point ROS is  "obtained and negative except the ones mentioned in the HPI    Vitals:  Vitals:    02/16/25 0755   BP: (!) 148/92   Pulse: 88   Resp: 18   Temp:    SpO2: 99%         Exam:  Constitutional: No acute distress, wearing sunglassess  Resp: breathing comfortably  Cardio: well perfused  GI: nondistended  MSK: full range of motion  Neuro: Awake, Ox3  Difficulty counting fingers  face symmetric  RUE 5/5  LUE 5/5  RLE 5/5  LLE 5/5  sensation intact to light touch  No uriarte's, no clonus  Psych: appropriate  Skin: incision healing well, sutures in place, no fluctuance, erythema, or leakage    Neurological Exam  Physical Exam  Spine Musculoskeletal Exam    Last Recorded Vitals  Blood pressure (!) 148/92, pulse 88, temperature 36.3 °C (97.4 °F), temperature source Oral, resp. rate 18, height 1.575 m (5' 2\"), weight 109 kg (240 lb), SpO2 99%.    Relevant Results  CT chest abdomen pelvis w IV contrast   Final Result   1. No acute abnormality in the chest, abdomen, or pelvis.   2. The ventriculoperitoneal shunt catheter is intact throughout its   course from the chest to the pelvis without discontinuity, kinking,   or surrounding fluid collection/abscess, terminating in the left   upper perirectal fat.                  I personally reviewed the image(s)/study and resident interpretation.   I agree with the findings as stated by resident Martin Weston.   Data analyzed and images interpreted at [facility], [place], [place].        MACRO:   None.        Signed by: Fabio Mcclelland 2/16/2025 1:16 AM   Dictation workstation:   [id number]      XR shunt series   Final Result   1.  Right parietal ventriculostomy shunt catheter without kinking or   discontinuity, as described above.   2. No acute cardiopulmonary process or bowel obstruction.        I personally reviewed the image(s)/study and resident interpretation.   I agree with the findings as stated by resident Martin Weston.   Data analyzed " and images interpreted at [facility], [place], [place].        MACRO:   None        Signed by: Fabio Mcclelland 2/15/2025 9:11 PM   Dictation workstation:   [id number]      CT head wo IV contrast   Final Result   1. No acute intracranial abnormality or calvarial fracture.   2. No significant interval change in appearance of the head compared   to prior examination with unchanged position of right frontal   approach ventriculostomy catheter.        I personally reviewed the images/study and I agree with the findings   as stated above by resident physician, Fabio Dupont MD. This study   was interpreted at [facility],   [place], [place].        MACRO:   None.        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 Neurosurgery consulted for shunt evaluation.      In the setting of headaches, vision changes and nausea patient requires shunt work up. Patient's main previous failure symptoms involve vision changes. So far work up with  "stable CT imaging and SS demonstrating intact system at the correct setting, no optic disc edema on ophthalmalgic exam, vision changes were primarily described as floaters and visual acuity stable, overall low concern for shunt failure at this time.     In the setting of chills and sweats however, must rule out post op infection in the setting of recent surgery. So far infectious markers benign, and incision is healing well with sutures in place. Patient with neck \"tightness\" when she turns her head from side to side but not characteristic of meningismus, more likely feeling traction of shunt tubing. Notably patient also with concern for pneumonia, recently started on antibiotics by PCP.      Tremors and unsteadiness likely unrelated to shunt, as patient reported that this has been the case since her AED's were increased.     Plan:  Admit to floor under NSGY  Possible OR planning for LF VPS   Appreciate ophtho recs - no papilledema   Will continue to follow infectious work up, blood cultures pending  Please ensure patient has CBC/RFP/coag/T&S/UA/UPT (if applicable)/EKG/CXR  Obtain keppra and vimpat levels for concerns, will need follow up with her outpatient epilepsy doctor  Further recs pending work up    Rom Hicks MD  Note authored by resident on neurosurgery team, with all questions or to contact team please page at [phone]   "

## 2025-02-16 NOTE — CONSULTS
Reason For Consult  Disc edema eval    History Of Present Illness  Jonathan Ayala is a 49 y.o. female w a history of left NAION with bilateral sequential vision loss with right eye improvement 11/2019, IIH s/p  shunt last revised 1/30/25, seizures, scleroderma, Sjogrens, CVID on IVIG, POTS, HTN, DM2, hypothyroidism, BRENT on CPAP, migraines presenting with increased lethargy, dizziness, confusion, word finding difficulty, headaches and nausea. Ophthalmology consulted for disc edema evaluation.     Patient reports floaters in both eyes that are grayish and  sometimes blackish. There are about 10-20 of them in each eye, she can see past them. Her vision has not really worsened and her peripheral vision loss hasn't changed.  Denies any other visual complaints including eye pain, diplopia, new flashes or floaters, or sudden vision loss.     Patient's main complaints are her nausea that isn't resolved with Zofran, w 1 episode of vomiting yesterday, as well as her confusion. She says she has been talking to people and changing topics mid sentence without realizing it.     Patient last seen by ophthalmology 1/31/25 and has follow up with neuro-ophthalmologist Dr. Mederos 2/17/25.         Past Medical History  She has a past medical history of Abnormal findings on diagnostic imaging of other abdominal regions, including retroperitoneum (10/14/2020), Acquired deformity of nose (03/24/2022), Acute upper respiratory infection, unspecified (10/16/2019), Adrenal disease (Multi), Allergic, Allergy status to unspecified drugs, medicaments and biological substances (05/22/2020), Allergy status to unspecified drugs, medicaments and biological substances (11/13/2020), Anemia, Anxiety (2005), Asthma, Benign intracranial hypertension (01/27/2022), Bipolar disorder, unspecified (Multi), Breast calcification, right (08/21/2018), Cellulitis of abdominal wall (09/28/2022), Cervicalgia (07/01/2020), Chronic maxillary sinusitis (01/04/2022),  Chronic sialoadenitis (03/16/2020), COVID-19 (01/06/2022), Decreased white blood cell count, unspecified (11/04/2019), Disease of thyroid gland, Disturbances of salivary secretion (03/16/2020), Dry eye syndrome of bilateral lacrimal glands (10/07/2022), Encounter for preprocedural cardiovascular examination (02/01/2022), Food additives allergy status (06/11/2020), Fracture of nasal bones, initial encounter for closed fracture (03/03/2022), GERD (gastroesophageal reflux disease) (15 years old), Granuloma of right orbit (10/07/2021), History of endometrial ablation (11/09/2017), Hyperlipidemia, Hypertension, Hyperthyroidism, Hypoglycemia, Hypothyroidism, Immunocompromised, Localized swelling, mass and lump, head (03/24/2022), Major depressive disorder, recurrent, in full remission (CMS-HCC) (10/07/2021), 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), Meningitis ([facility]) (2008), Migraine, Nipple discharge (08/24/2022), Ocular pain, right eye (10/07/2022), Optic atrophy, Other abnormal and inconclusive findings on diagnostic imaging of breast (07/06/2020), Other anomalies of pupillary function (05/31/2019), Other chest pain (05/18/2020), Other conditions influencing health status (08/01/2022), Other conditions influencing health status (08/03/2021), Other specified disorders of eustachian tube, left ear (11/18/2019), Other specified disorders of nose and nasal sinuses (03/24/2022), Other specified disorders of nose and nasal sinuses (03/24/2022), Pelvic and perineal pain (07/06/2020), Personal history of other diseases of the circulatory system (04/07/2020), Personal history of other diseases of the circulatory system (04/07/2020), Personal history of other diseases of the circulatory system, Personal history of other diseases of the digestive system, Personal history of other diseases of the digestive system (03/02/2020), Personal history of other diseases of the musculoskeletal system and connective tissue  (01/19/2022), Personal history of other diseases of the musculoskeletal system and connective tissue (03/02/2021), Personal history of other diseases of the musculoskeletal system and connective tissue (06/16/2020), Personal history of other diseases of the nervous system and sense organs (11/18/2019), Personal history of other diseases of the nervous system and sense organs (09/21/2022), Personal history of other diseases of the respiratory system (04/14/2021), Personal history of other endocrine, nutritional and metabolic disease (02/17/2021), Personal history of other mental and behavioral disorders (05/27/2021), Personal history of other specified conditions (09/07/2022), Personal history of other specified conditions (10/16/2019), Personal history of other specified conditions (09/28/2022), Personal history of other specified conditions (09/16/2021), Personal history of other specified conditions (02/01/2022), Personal history of other specified conditions (03/09/2022), Personal history of other specified conditions (02/12/2014), Personal history of other specified conditions (10/27/2021), Personal history of other specified conditions (10/16/2019), Personal history of other specified conditions (02/26/2021), Personal history of other specified conditions (02/22/2021), Personal history of urinary calculi, Polycystic ovary syndrome, Postural orthostatic tachycardia syndrome (POTS), Rash and other nonspecific skin eruption (03/15/2022), Repeated falls (06/23/2021), Right lower quadrant pain (10/14/2020), Seizures (Multi), Sjogren syndrome, unspecified (Multi), Sjogren's syndrome, Sleep apnea, Slow transit constipation (07/09/2020), Subarachnoid hemorrhage, traumatic (Multi) (04/19/2023), Thyroid nodule, Traumatic subarachnoid hemorrhage with loss of consciousness of unspecified duration, subsequent encounter (03/15/2022), Traumatic subarachnoid hemorrhage without loss of consciousness, subsequent encounter, Type  "2 diabetes mellitus, Unspecified disorder of refraction (10/07/2022), Unspecified optic neuritis (11/06/2020), Unspecified optic neuritis (11/06/2020), Unspecified visual loss (09/25/2019), Varicella (As a child), Venous insufficiency (chronic) (peripheral) (10/18/2021), Viral infection, unspecified (01/11/2022), Vitamin D deficiency, and Vitamin D deficiency, unspecified (09/28/2022).    Exam  Base Eye Exam       Visual Acuity (Snellen - Linear)         Right Left    Near sc 20/30-2 phni 20/50+2              Tonometry (Tonopen, 9:56 PM)         Right Left    Pressure 16 14              Pupils         Dark Light Shape React APD    Right 6 5 Round Sluggish None    Left 6 5 Round Sluggish Trace              Visual Fields (Counting fingers)         Left Right                                Neuro/Psych       Oriented x3: Yes    Mood/Affect: Normal              Dilation       Both eyes: 1% Tropic 2.5% Phen @ 9:45 PM                  Slit Lamp and Fundus Exam       External Exam         Right Left    External Normal Normal              Slit Lamp Exam         Right Left    Lids/Lashes Normal Normal    Conjunctiva/Sclera White and quiet White and quiet    Cornea Clear Clear    Anterior Chamber Deep and quiet Deep and quiet    Iris Round and reactive Round and reactive    Lens Clear Clear    Anterior Vitreous Normal Normal              Fundus Exam         Right Left    Disc pallor temporally, no disc edema Pallor, no disc edema    C/D Ratio 0.15 0.15    Macula Normal Normal    Vessels Normal Normal    Periphery Normal Normal                       Last Recorded Vitals  Blood pressure 163/88, pulse 95, temperature 36.7 °C (98 °F), temperature source Tympanic, resp. rate 16, height 1.575 m (5' 2\"), weight 109 kg (240 lb), SpO2 98%.    Relevant Results    CT head wo contrast which I reviewed with right sided  shunt without evidence of ventriculomegaly or acute intracranial process      Assessment/Plan     #Idiopathic " intracranial hypertension  #NAION, both eyes   #S/p  shunt  -49yoF w PMHx of left NAION w bl sequential vision loss w right eye improvement 11/13/2019, IIH s/p  shunt, seizures, scleroderma, Sjogren, CVID on monthly IVIG, POTS, HTN, DM2, hypothyroidism, BRENT on CPAP, migraine recently s/p  shunt revision on 1/30/25 presenting with headaches, nausea, dizziness, lethargy, and floaters both eyes.   -2/15 shunt studies normal and CTH without evidence of ventriculomegaly  -Exam stable compared to prior exam 1/31/25, no evidence of disc edema on dilated exam   - Absence of optic disc edema does not rule out elevated ICP. If clinical suspicion remains high, further workup including but not limited to imaging, lumbar puncture with opening pressure potentially indicated.          Thank you for the consult. Please contact the Ophthalmology service with further questions or concerns.          Kia Robins MD  Ophthalmology, PGY-2    Ophthalmology Adult Pager - [phone]  Ophthalmology Pediatrics Pager (8am- 5pm) - [phone]    For adult follow-up appointments, call: [phone]  For pediatric follow-up appointments, call: [phone]      NOTE: This note is not finalized until attending reviews and signs.
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
## 2025-02-16 NOTE — PROGRESS NOTES
Emergency Department Transition of Care Note       Signout   I received Jonathan Ayala in signout from Dr. Moreira.  Please see the ED Provider Note for all HPI, PE and MDM up to the time of signout at 700.  This is in addition to the primary record.    In brief Jonathan Ayala is an 49 y.o. female presenting for IIH, CVID, sjogren's, right hemiplegic migraines here for VPS complications, multiple symptoms along with headache. She had a prior  shunt adjustment. Neurosurgery following. Underwent LP.     At the time of signout we were awaiting:  Neurosurgery recommendations    ED Course & Medical Decision Making   Medical Decision Making:  Under my care, per [facility] recs, admit to neurosurgery. Patient was admitted and plan was discussed with patient who is amenable to the plan.    ED Course:  Diagnoses as of 02/16/25 0831   Chronic nonintractable headache, unspecified headache type   History of brain shunt       Disposition   As a result of their workup, the patient will require admission to the hospital.  The patient was informed of her diagnosis.  The patient was given the opportunity to ask questions and I answered them. The patient agreed to be admitted to the hospital.    Procedures   Procedures    Patient seen and discussed with ED attending physician.    Luz Patel MD  Emergency Medicine

## 2025-02-16 NOTE — PROCEDURES
Lumbar Puncture    Date/Time: 2/16/2025 5:58 AM    Performed by: Jesus Lambert MD  Authorized by: Ronnie Moreira DO    Consent:     Consent obtained:  Written    Consent given by:  Patient    Risks, benefits, and alternatives were discussed: yes      Risks discussed:  Bleeding, infection, pain, nerve damage and headache  Universal protocol:     Immediately prior to procedure a time out was called: yes      Patient identity confirmed:  Arm band  Pre-procedure details:     Procedure purpose:  Diagnostic    Preparation: Patient was prepped and draped in usual sterile fashion    Procedure details:     Lumbar space:  L3-L4 interspace    Number of attempts:  1    Fluid appearance:  Blood-tinged  Comments:      Opening pressure 27, closing 16

## 2025-02-16 NOTE — CONSULTS
Inpatient consult to Neurosurgery  Consult performed by: Jesus Lambert MD  Consult ordered by: iLnda Matos MD      Date of Service:  2/15/2025 Attending Provider:  Linda Matos MD     Reason for Consultation:  Jonathan Ayala is being seen today for a consult requested by Linda Matos MD for shunt evaluation.    Subjective   History of Present Illness:  Jonathan is a 49 y.o. female with No Principal Problem: There is no principal problem currently on the Problem List. Please update the Problem List and refresh.    Patient reported that for about two days now she's been having worsening headaches, vision changes (floaters), chills and sweats, pain at her incision site, difficulty turning her head because she feels like it catches on her neck. Otherwise also endorses progressively worsened tremors and instability for a longer amount of time.     Patient also reported that her primary care doctor started her on moxifloxacin for concern for pneumonia for her prolonged cough.  Otherwise, patient denied any weakness, numbness or tingling. No recent falls. Has been having normal bowel movements.     Review of Systems negative other than listed in HPI.    Objective   Vitals:  Vitals:    02/15/25 1834   BP: 163/88   Pulse: 95   Resp: 16   Temp:    SpO2: 98%         Exam:  Constitutional: No acute distress, wearing sunglassess  Resp: breathing comfortably  Cardio: well perfused  GI: nondistended  MSK: full range of motion  Neuro: Awake, Ox3  Difficulty counting fingers  face symmetric  RUE 5/5  LUE 5/5  RLE 5/5  LLE 5/5  sensation intact to light touch  No uriarte's, no clonus  Psych: appropriate  Skin: incision healing well, sutures in place, no fluctuance, erythema, or leakage    Medical History  Past Medical History:   Diagnosis Date    Abnormal findings on diagnostic imaging of other abdominal regions, including retroperitoneum 10/14/2020    Abnormal CT of the abdomen    Acquired deformity of nose  03/24/2022    Nasal deformity    Acute upper respiratory infection, unspecified 10/16/2019    Acute URI    Adrenal disease (Multi)     Allergic     Allergy status to unspecified drugs, medicaments and biological substances 05/22/2020    History of drug allergy    Allergy status to unspecified drugs, medicaments and biological substances 11/13/2020    History of adverse drug reaction    Anemia     Anxiety 2005    Asthma     Benign intracranial hypertension 01/27/2022    Pseudotumor cerebri    Bipolar disorder, unspecified (Multi)     Bipolar disease, chronic    Breast calcification, right 08/21/2018    Cellulitis of abdominal wall 09/28/2022    Cellulitis of right abdominal wall    Cervicalgia 07/01/2020    Cervicalgia of mwaowkgr-whvbewn-ceszz region    Chronic maxillary sinusitis 01/04/2022    Chronic maxillary sinusitis    Chronic sialoadenitis 03/16/2020    Chronic sialoadenitis    COVID-19 01/06/2022    COVID-19 with multiple comorbidities    Decreased white blood cell count, unspecified 11/04/2019    Leukopenia    Disease of thyroid gland     Disturbances of salivary secretion 03/16/2020    Xerostomia    Dry eye syndrome of bilateral lacrimal glands 10/07/2022    Dry eyes, bilateral    Encounter for preprocedural cardiovascular examination 02/01/2022    Preoperative cardiovascular examination    Food additives allergy status 06/11/2020    Allergy to food dye    Fracture of nasal bones, initial encounter for closed fracture 03/03/2022    Closed fracture of nasal bone, initial encounter    GERD (gastroesophageal reflux disease) 15 years old    Granuloma of right orbit 10/07/2021    Inflammatory pseudotumor of right orbit    History of endometrial ablation 11/09/2017    Hyperlipidemia     Hypertension     Hyperthyroidism     Hypoglycemia     Hypothyroidism     Immunocompromised     Localized swelling, mass and lump, head 03/24/2022    Swollen nose    Major depressive disorder, recurrent, in full remission  (CMS-HCC) 10/07/2021    Depression, major, recurrent, in complete remission    Mammary duct ectasia of left breast 08/24/2022    Periductal mastitis of left breast    Meningitis ([facility]) 2008    Migraine     Nipple discharge 08/24/2022    Bloody discharge from left nipple    Ocular pain, right eye 10/07/2022    Pain in right eye    Optic atrophy     Other abnormal and inconclusive findings on diagnostic imaging of breast 07/06/2020    Other abnormal and inconclusive findings on diagnostic imaging of breast    Other anomalies of pupillary function 05/31/2019    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    Other chest pain 05/18/2020    Chest discomfort    Other conditions influencing health status 08/01/2022    History of cough    Other conditions influencing health status 08/03/2021    Chronic migraine    Other specified disorders of eustachian tube, left ear 11/18/2019    ETD (Eustachian tube dysfunction), left    Other specified disorders of nose and nasal sinuses 03/24/2022    Nasal dryness    Other specified disorders of nose and nasal sinuses 03/24/2022    Nasal crusting    Pelvic and perineal pain 07/06/2020    Pelvic pain    Personal history of other diseases of the circulatory system 04/07/2020    History of sinus tachycardia    Personal history of other diseases of the circulatory system 04/07/2020    History of abnormal electrocardiography    Personal history of other diseases of the circulatory system     History of Raynaud's syndrome    Personal history of other diseases of the digestive system     History of irritable bowel syndrome    Personal history of other diseases of the digestive system 03/02/2020    History of oral pain    Personal history of other diseases of the musculoskeletal system and connective tissue 01/19/2022    History of neck pain    Personal history of other diseases of the musculoskeletal system and connective tissue 03/02/2021    History of scleroderma    Personal history of  other diseases of the musculoskeletal system and connective tissue 06/16/2020    History of muscle weakness    Personal history of other diseases of the nervous system and sense organs 11/18/2019    History of hearing loss    Personal history of other diseases of the nervous system and sense organs 09/21/2022    History of partial seizures    Personal history of other diseases of the respiratory system 04/14/2021    History of asthma    Personal history of other endocrine, nutritional and metabolic disease 02/17/2021    History of diabetes mellitus    Personal history of other mental and behavioral disorders 05/27/2021    History of anxiety    Personal history of other specified conditions 09/07/2022    History of nipple discharge    Personal history of other specified conditions 10/16/2019    History of headache    Personal history of other specified conditions 09/28/2022    History of lump of left breast    Personal history of other specified conditions 09/16/2021    History of persistent cough    Personal history of other specified conditions 02/01/2022    History of palpitations    Personal history of other specified conditions 03/09/2022    History of headache    Personal history of other specified conditions 02/12/2014    History of chest pain    Personal history of other specified conditions 10/27/2021    History of nausea and vomiting    Personal history of other specified conditions 10/16/2019    History of fatigue    Personal history of other specified conditions 02/26/2021    History of orthopnea    Personal history of other specified conditions 02/22/2021    History of shortness of breath    Personal history of urinary calculi     H/O renal calculi    Polycystic ovary syndrome     Postural orthostatic tachycardia syndrome (POTS)     POTS (postural orthostatic tachycardia syndrome)    Rash and other nonspecific skin eruption 03/15/2022    Rash    Repeated falls 06/23/2021    Recurrent falls    Right lower  quadrant pain 10/14/2020    Abdominal pain, RLQ (right lower quadrant)    Seizures (Multi)     Sjogren syndrome, unspecified (Multi)     History of Sjogren's disease    Sjogren's syndrome     Sleep apnea     Slow transit constipation 07/09/2020    Slow transit constipation    Subarachnoid hemorrhage, traumatic (Multi) 04/19/2023    Thyroid nodule     Traumatic subarachnoid hemorrhage with loss of consciousness of unspecified duration, subsequent encounter 03/15/2022    Subarachnoid hemorrhage following injury, with loss of consciousness, subsequent encounter    Traumatic subarachnoid hemorrhage without loss of consciousness, subsequent encounter     Subarachnoid hemorrhage following injury, no loss of consciousness, subsequent encounter    Type 2 diabetes mellitus     Unspecified disorder of refraction 10/07/2022    Refractive error    Unspecified optic neuritis 11/06/2020    Right optic neuritis    Unspecified optic neuritis 11/06/2020    Optic neuritis, right    Unspecified visual loss 09/25/2019    Vision loss    Varicella As a child    Venous insufficiency (chronic) (peripheral) 10/18/2021    Chronic venous insufficiency of lower extremity    Viral infection, unspecified 01/11/2022    Nonspecific syndrome suggestive of viral illness    Vitamin D deficiency     Vitamin D deficiency, unspecified 09/28/2022    Vitamin D deficiency       Surgical History  Past Surgical History:   Procedure Laterality Date    APPENDECTOMY  2017    BRAIN SURGERY  [place] placement to measure pressure    CARDIAC CATHETERIZATION      CHOLECYSTECTOMY      ENDOMETRIAL ABLATION      FRACTURE SURGERY  12/2023    HERNIA REPAIR Right 03/01/2024    with mesh    HYSTERECTOMY  2017    MR HEAD ANGIO WO IV CONTRAST  02/08/2021    MR HEAD ANGIO WO IV CONTRAST 2/8/2021 [facility]    MR NECK ANGIO WO IV CONTRAST  02/08/2021    MR NECK ANGIO WO IV CONTRAST 2/8/2021 [facility] CLINICAL LEGAC[facility]    OTHER SURGICAL HISTORY  08/22/2019    Carpal tunnel  surgery    OTHER SURGICAL HISTORY  08/22/2019    Hysterectomy    OTHER SURGICAL HISTORY  08/22/2019    Venous access port placement    OTHER SURGICAL HISTORY  08/22/2019    Cholecystectomy    OTHER SURGICAL HISTORY  08/22/2019    Appendectomy    OTHER SURGICAL HISTORY  08/22/2019    Pyloroplasty    WISDOM TOOTH EXTRACTION  2004        Medications  Current Outpatient Medications   Medication Instructions    acetaminophen (TYLENOL) 325-650 mg, Every 6 hours PRN    Advair -21 mcg/actuation inhaler INHALE TWO PUFFS BY MOUTH AS INSTRUCTED TWO TIMES A DAY.    amitriptyline (ELAVIL) 100 mg, oral, Nightly    azelastine (Astelin) 137 mcg (0.1 %) nasal spray 1 spray, 2 times daily    blood-glucose sensor (DEXCOM G7 SENSOR MISC) Use to check blood sugar continuously throughout the day as directed. Change sensor every 10 days.    calcium-vitamin D3-vitamin K (Viactiv) 650 mg-12.5 mcg-40 mcg chewable tablet 2 tablets, Daily    carboxymethylcellulose (Refresh Celluvisc) 1 % ophthalmic solution dropperette 2 drops, 3 times daily PRN    cholecalciferol (Vitamin D-3) 5,000 Units tablet 1 tablet, Daily    clonazePAM (KlonoPIN) 0.5 mg tablet 1 tablet, 2 times daily    Dexcom G7  misc Use as instructed to check blood sugars continuously throughout the day    divalproex (Depakote ER) 500 mg 24 hr tablet 1 tablet, 2 times daily    EPINEPHrine 0.3 mg/0.3 mL injection syringe INJECT INTRAMUSCULARLY ONCE AS NEEDED FOR ANAPHYLAXIS    ezetimibe (ZETIA) 10 mg, oral, Daily    fluconazole (Diflucan) 150 mg tablet 1 tablet by mouth daily spread 72 hours apart    fluticasone (Flonase) 50 mcg/actuation nasal spray USE 1 SPRAY INTO EACH NOSTRIL ONCE DAILY.    folic acid (FOLVITE) 1 mg, oral, Daily    furosemide (LASIX) 20 mg, oral, 2 times daily    gloves, latex with aloe vera misc Large size gloves    glucagon (Baqsimi) 3 mg/actuation spray,non-aerosol USE ONE SPRAY IN THE NOSE AS NEEDED FOR LOW BLOOD SUGAR  MAY REPEAT AFTER 15  "MINUTES USING A NEW DEVICE IF NO RESPONSE    glucagon (GLUCAGEN) 1 mg, subcutaneous, Once as needed    hydrocortisone (CORTEF) 10 mg, oral, 2 times daily, Double the dose if sick for three days    immune globulin, human, (Gammagard) infusion Infuse 600 mL (60 g) into a venous catheter every 14 (fourteen) days.    insulin glargine (Toujeo Max Solostar- 2 unit dial) 300 unit/mL (3 mL) injection Inject 54 units under the skin every morning    insulin lispro (HumaLOG KwikPen Insulin) 100 unit/mL injection Use as directed to give up to 100 units a day    ipratropium-albuteroL (Duo-Neb) 0.5-2.5 mg/3 mL nebulizer solution 3 mL, 4 times daily PRN    lacosamide (VIMPAT) 300 mg, oral, 2 times daily    lasmiditan (Reyvow) 100 mg tablet Take 1 tablet (100 mg) by mouth if needed for migraine. Do not drive in 8 hours of taking this medicine. Maximum 1 dose per 24 hours.    levETIRAcetam (KEPPRA) 1,500 mg, oral, 2 times daily    levothyroxine (SYNTHROID, LEVOXYL) 75 mcg, oral, Daily before breakfast    miconazole (Micotin) 2 % cream 1 Application, Daily PRN    miconazole (Micotin) 2 % powder Apply to groin , under the breast and skin folds daily    miscellaneous medical supply [facility]  fragrance free    moisturizing mouth (Biotene Oral Dry Mouth) solution 1 spray, 4 times daily PRN    montelukast (SINGULAIR) 10 mg, oral, Nightly    Motegrity 2 mg tablet 1 tablet, Daily    nasal spray Nayzilam 5 mg/spray (0.1 mL) spray,non-aerosol Once    ondansetron ODT (ZOFRAN-ODT) 4 mg, oral, Every 8 hours PRN    OneTouch Delica Plus Lancet 33 gauge misc USE 1 LANCET TO CHECK GLUCOSE ONCE DAILY AS DIRECTED    OneTouch Verio test strips strip USE 1 STRIP TO CHECK GLUCOSE ONCE DAILY AS DIRECTED    pantoprazole (PROTONIX) 40 mg, 2 times daily before meals    pen needle, diabetic (BD Lela 2nd Gen Pen Needle) 32 gauge x 5/32\" needle Use as directed with insulin pen up to 5 times daily    polyethylene glycol (GLYCOLAX, MIRALAX) 17 g, 3 times " daily PRN    propranolol LA (INDERAL LA) 60 mg, oral, Daily, Do not crush, chew, or split.    rOPINIRole (Requip) 0.5 mg tablet Take 1 tablet by mouth (0.5mg) in the morning and 2 tablets (1mg) by mouth in the evening    senna 8.6 mg tablet 1 tablet, Nightly    tiZANidine (ZANAFLEX) 2 mg, 2 times daily    ubrogepant (UBRELVY) 100 mg, oral, As needed, May repeat in 2 hours for max of 200mg per 24 hours.    ZINC ORAL 50 mg, Daily        Diagnostic Results:    Lab Results   Component Value Date    WBC 6.8 02/15/2025    HGB 12.6 02/15/2025    HCT 39.6 02/15/2025    MCV 83 02/15/2025     02/15/2025     Lab Results   Component Value Date    CREATININE 1.03 02/15/2025    BUN 25 (H) 02/15/2025     02/15/2025    K 4.3 02/15/2025     02/15/2025    CO2 29 02/15/2025     Lab Results   Component Value Date    INR 1.0 01/28/2025    INR 1.0 01/22/2025    INR 1.0 01/08/2025    PROTIME 11.4 01/28/2025    PROTIME 11.1 01/22/2025    PROTIME 10.9 01/08/2025       === 02/15/25 ===    CT HEAD WO IV CONTRAST    - Impression -  1. No acute intracranial abnormality or calvarial fracture.  2. No significant interval change in appearance of the head compared  to prior examination with unchanged position of right frontal  approach ventriculostomy catheter.    I personally reviewed the images/study and I agree with the findings  as stated above by resident physician, Fabio Dupont MD. This study  was interpreted at [facility],  [place], [place].    MACRO:  None.    Signed by: Fabio Mcclelland 2/15/2025 9:10 PM  Dictation workstation:   [id number]  === 10/23/24 ===    MR ORBIT W AND WO IV CONTRAST    - Impression -  MR brain:    Limited evaluation of the right frontoparietal region due to  susceptibility artifact.  1. Interval placement of right frontal approach ventriculostomy  catheter with tip terminating within the foramen of Monro.  Unremarkable size and appearance of the ventricular  system. Gliosis  within the right superior frontal lobe and anterior frontal white  matter along the course of the ventriculostomy catheter.  2. No evidence of acute infarct, intracranial mass effect, or midline  shift.      MR orbit:    Redemonstration of asymmetric atrophy of the left optic nerve. No  evidence of abnormal enhancement or intraorbital mass. The right  optic nerve is normal in size.      I personally reviewed the images/study and I agree with the findings  as stated by Dr. Brody Daley M.D. This study was interpreted at  [place], [place].    MACRO:  None    Signed by: John Mcnamara 10/23/2024 9:37 PM  Dictation workstation:   [id number]    Assessment/Plan   Assessment:  49yF h/o IIH (dx 2/2022 w/ OP 25) s/p RF bolt c/b tract hemorrhage and focal epilepsy, right hemiplegic migraines, CVID on monthly IVIG infusions, Sjogren's syndrome/scleroderma, POTS, T2DM, HTN, hypothyroidism, BRENT on CPAP, anxiety/depression, left non-arteritic anterior ischemic optic neuropathy with b/l sequential vision loss 10/30/2024 s/p R VPS exploration w abdominal catheter repositioning w improvement in distal runnoff, 11/15/2024 s/p RF shunt wound revision and fractured valve replacement, 12/10 s/p R cranial wound exploration, washout, revision, 1/28 p/w drainage from shunt site, cough, vision changes, 1/28 s/p LP (OP31), 1/30 s/p valve exchange (certas at 4), 2/15 p/w HA, vision changes, shunt pain, nausea, sweats/chills, tremors, unsteadiness for 2d, CTH stable, SS intact    Patient with many nonspecific complaints. Neurosurgery consulted for shunt evaluation.     In the setting of headaches, vision changes and nausea patient requires shunt work up. Patient's main previous failure symptoms involve vision changes. So far work up with stable CT imaging and SS demonstrating intact system at the correct setting, no optic disc edema on ophthalmalgic exam, vision changes were  "primarily described as floaters and visual acuity stable, overall low concern for shunt failure at this time.    In the setting of chills and sweats however, must rule out post op infection in the setting of recent surgery. So far infectious markers benign, and incision is healing well with sutures in place. Patient with neck \"tightness\" when she turns her head from side to side but not characteristic of meningismus, more likely feeling traction of shunt tubing. Notably patient also with concern for pneumonia, recently started on antibiotics by PCP.     Tremors and unsteadiness likely unrelated to shunt, as patient reported that this has been the case since her AED's were increased.    Plan:  Recommend CT chest, abdomen and pelvis with and without contrast for infectious work up and to rule out pseudocyst  Appreciate ophtho recs  Will continue to follow infectious work up, blood cultures pending  Please ensure patient has CBC/RFP/coag/T&S/UA/UPT (if applicable)/EKG/CXR  Obtain keppra and vimpat levels for concerns, will need follow up with her outpatient epilepsy doctor  Further recs pending work up    Jesus Lambert MD    "

## 2025-02-17 ENCOUNTER — PHARMACY VISIT (OUTPATIENT)
Dept: PHARMACY | Facility: CLINIC | Age: 50
End: 2025-02-17
Payer: MEDICAID

## 2025-02-17 ENCOUNTER — APPOINTMENT (OUTPATIENT)
Dept: RADIOLOGY | Facility: HOSPITAL | Age: 50
End: 2025-02-17
Payer: MEDICAID

## 2025-02-17 ENCOUNTER — APPOINTMENT (OUTPATIENT)
Dept: OPHTHALMOLOGY | Facility: CLINIC | Age: 50
End: 2025-02-17
Payer: MEDICAID

## 2025-02-17 LAB
GLUCOSE BLD MANUAL STRIP-MCNC: 159 MG/DL (ref 74–99)
GLUCOSE BLD MANUAL STRIP-MCNC: 182 MG/DL (ref 74–99)
GLUCOSE BLD MANUAL STRIP-MCNC: 202 MG/DL (ref 74–99)
GLUCOSE BLD MANUAL STRIP-MCNC: 207 MG/DL (ref 74–99)
GLUCOSE BLD MANUAL STRIP-MCNC: 82 MG/DL (ref 74–99)

## 2025-02-17 PROCEDURE — 2550000001 HC RX 255 CONTRASTS: Mod: SE | Performed by: NEUROLOGICAL SURGERY

## 2025-02-17 PROCEDURE — 36415 COLL VENOUS BLD VENIPUNCTURE: CPT

## 2025-02-17 PROCEDURE — 87040 BLOOD CULTURE FOR BACTERIA: CPT

## 2025-02-17 PROCEDURE — 87075 CULTR BACTERIA EXCEPT BLOOD: CPT

## 2025-02-17 PROCEDURE — 2500000001 HC RX 250 WO HCPCS SELF ADMINISTERED DRUGS (ALT 637 FOR MEDICARE OP): Mod: SE

## 2025-02-17 PROCEDURE — 2500000004 HC RX 250 GENERAL PHARMACY W/ HCPCS (ALT 636 FOR OP/ED): Mod: SE | Performed by: NEUROLOGICAL SURGERY

## 2025-02-17 PROCEDURE — 82947 ASSAY GLUCOSE BLOOD QUANT: CPT

## 2025-02-17 PROCEDURE — A9585 GADOBUTROL INJECTION: HCPCS | Mod: SE | Performed by: NEUROLOGICAL SURGERY

## 2025-02-17 PROCEDURE — 2500000004 HC RX 250 GENERAL PHARMACY W/ HCPCS (ALT 636 FOR OP/ED): Mod: SE

## 2025-02-17 PROCEDURE — 70553 MRI BRAIN STEM W/O & W/DYE: CPT

## 2025-02-17 PROCEDURE — 1200000002 HC GENERAL ROOM WITH TELEMETRY DAILY

## 2025-02-17 PROCEDURE — 70553 MRI BRAIN STEM W/O & W/DYE: CPT | Performed by: RADIOLOGY

## 2025-02-17 PROCEDURE — 2500000002 HC RX 250 W HCPCS SELF ADMINISTERED DRUGS (ALT 637 FOR MEDICARE OP, ALT 636 FOR OP/ED): Mod: SE

## 2025-02-17 RX ORDER — HYDROCORTISONE 10 MG/1
10 TABLET ORAL DAILY
Status: DISCONTINUED | OUTPATIENT
Start: 2025-02-18 | End: 2025-02-18 | Stop reason: HOSPADM

## 2025-02-17 RX ORDER — FLUTICASONE PROPIONATE 50 MCG
1 SPRAY, SUSPENSION (ML) NASAL DAILY
Status: DISCONTINUED | OUTPATIENT
Start: 2025-02-17 | End: 2025-02-18 | Stop reason: HOSPADM

## 2025-02-17 RX ORDER — SENNOSIDES 8.6 MG/1
1 TABLET ORAL NIGHTLY
Status: DISCONTINUED | OUTPATIENT
Start: 2025-02-17 | End: 2025-02-18 | Stop reason: HOSPADM

## 2025-02-17 RX ORDER — METHYLPREDNISOLONE 4 MG/1
TABLET ORAL
COMMUNITY
Start: 2025-02-08

## 2025-02-17 RX ORDER — GADOBUTROL 604.72 MG/ML
10 INJECTION INTRAVENOUS ONCE
Status: COMPLETED | OUTPATIENT
Start: 2025-02-17 | End: 2025-02-17

## 2025-02-17 RX ORDER — LORAZEPAM 2 MG/ML
1 INJECTION INTRAMUSCULAR ONCE
Status: COMPLETED | OUTPATIENT
Start: 2025-02-17 | End: 2025-02-17

## 2025-02-17 RX ORDER — ONDANSETRON HYDROCHLORIDE 2 MG/ML
8 INJECTION, SOLUTION INTRAVENOUS EVERY 8 HOURS PRN
Status: DISCONTINUED | OUTPATIENT
Start: 2025-02-17 | End: 2025-02-18

## 2025-02-17 RX ORDER — MOXIFLOXACIN HYDROCHLORIDE 400 MG/1
1 TABLET ORAL
COMMUNITY
Start: 2025-02-07

## 2025-02-17 RX ORDER — ONDANSETRON 8 MG/1
8 TABLET, ORALLY DISINTEGRATING ORAL EVERY 8 HOURS PRN
Status: DISCONTINUED | OUTPATIENT
Start: 2025-02-17 | End: 2025-02-18

## 2025-02-17 RX ADMIN — INSULIN LISPRO 1 UNITS: 100 INJECTION, SOLUTION INTRAVENOUS; SUBCUTANEOUS at 12:02

## 2025-02-17 RX ADMIN — ONDANSETRON 8 MG: 2 INJECTION INTRAMUSCULAR; INTRAVENOUS at 11:50

## 2025-02-17 RX ADMIN — INSULIN LISPRO 1 UNITS: 100 INJECTION, SOLUTION INTRAVENOUS; SUBCUTANEOUS at 09:35

## 2025-02-17 RX ADMIN — LEVETIRACETAM 1500 MG: 500 TABLET, FILM COATED ORAL at 09:28

## 2025-02-17 RX ADMIN — CLONAZEPAM 0.5 MG: 0.5 TABLET ORAL at 20:37

## 2025-02-17 RX ADMIN — FUROSEMIDE 20 MG: 40 TABLET ORAL at 09:28

## 2025-02-17 RX ADMIN — ROPINIROLE HYDROCHLORIDE 1 MG: 1 TABLET, FILM COATED ORAL at 22:03

## 2025-02-17 RX ADMIN — LEVOTHYROXINE SODIUM 75 MCG: 0.07 TABLET ORAL at 09:29

## 2025-02-17 RX ADMIN — LORAZEPAM 1 MG: 2 INJECTION INTRAMUSCULAR; INTRAVENOUS at 16:14

## 2025-02-17 RX ADMIN — INSULIN LISPRO 2 UNITS: 100 INJECTION, SOLUTION INTRAVENOUS; SUBCUTANEOUS at 16:14

## 2025-02-17 RX ADMIN — TIZANIDINE 2 MG: 4 TABLET ORAL at 20:37

## 2025-02-17 RX ADMIN — HEPARIN SODIUM 7500 UNITS: 5000 INJECTION, SOLUTION INTRAVENOUS; SUBCUTANEOUS at 15:12

## 2025-02-17 RX ADMIN — POLYETHYLENE GLYCOL 3350 17 G: 17 POWDER, FOR SOLUTION ORAL at 09:28

## 2025-02-17 RX ADMIN — DIVALPROEX SODIUM 500 MG: 500 TABLET, FILM COATED, EXTENDED RELEASE ORAL at 09:30

## 2025-02-17 RX ADMIN — MONTELUKAST 10 MG: 10 TABLET, FILM COATED ORAL at 20:38

## 2025-02-17 RX ADMIN — HYDROCORTISONE 10 MG: 10 TABLET ORAL at 09:29

## 2025-02-17 RX ADMIN — PANTOPRAZOLE SODIUM 40 MG: 40 TABLET, DELAYED RELEASE ORAL at 16:14

## 2025-02-17 RX ADMIN — FOLIC ACID 1 MG: 1 TABLET ORAL at 09:29

## 2025-02-17 RX ADMIN — LEVETIRACETAM 1500 MG: 500 TABLET, FILM COATED ORAL at 20:37

## 2025-02-17 RX ADMIN — SENNOSIDES 8.6 MG: 8.6 TABLET, FILM COATED ORAL at 20:38

## 2025-02-17 RX ADMIN — ONDANSETRON 8 MG: 2 INJECTION INTRAMUSCULAR; INTRAVENOUS at 19:50

## 2025-02-17 RX ADMIN — LACOSAMIDE 300 MG: 100 TABLET, FILM COATED ORAL at 09:28

## 2025-02-17 RX ADMIN — HEPARIN SODIUM 7500 UNITS: 5000 INJECTION, SOLUTION INTRAVENOUS; SUBCUTANEOUS at 22:21

## 2025-02-17 RX ADMIN — GADOBUTROL 10 MMOL: 604.72 INJECTION INTRAVENOUS at 17:15

## 2025-02-17 RX ADMIN — PROPRANOLOL HYDROCHLORIDE 60 MG: 60 CAPSULE, EXTENDED RELEASE ORAL at 09:43

## 2025-02-17 RX ADMIN — FUROSEMIDE 20 MG: 40 TABLET ORAL at 20:37

## 2025-02-17 RX ADMIN — LACOSAMIDE 300 MG: 100 TABLET, FILM COATED ORAL at 20:37

## 2025-02-17 RX ADMIN — PANTOPRAZOLE SODIUM 40 MG: 40 TABLET, DELAYED RELEASE ORAL at 09:29

## 2025-02-17 RX ADMIN — TIZANIDINE 2 MG: 4 TABLET ORAL at 09:29

## 2025-02-17 RX ADMIN — KETOROLAC TROMETHAMINE 30 MG: 30 INJECTION, SOLUTION INTRAMUSCULAR; INTRAVENOUS at 11:49

## 2025-02-17 RX ADMIN — CLONAZEPAM 0.5 MG: 0.5 TABLET ORAL at 09:28

## 2025-02-17 RX ADMIN — DIVALPROEX SODIUM 500 MG: 500 TABLET, FILM COATED, EXTENDED RELEASE ORAL at 20:37

## 2025-02-17 RX ADMIN — EZETIMIBE 10 MG: 10 TABLET ORAL at 09:29

## 2025-02-17 RX ADMIN — ONDANSETRON 4 MG: 2 INJECTION INTRAMUSCULAR; INTRAVENOUS at 01:19

## 2025-02-17 RX ADMIN — AMITRIPTYLINE HYDROCHLORIDE 100 MG: 25 TABLET, FILM COATED ORAL at 20:37

## 2025-02-17 RX ADMIN — ROPINIROLE 0.5 MG: 0.5 TABLET, FILM COATED ORAL at 09:30

## 2025-02-17 RX ADMIN — FLUTICASONE PROPIONATE 1 SPRAY: 50 SPRAY, METERED NASAL at 11:49

## 2025-02-17 SDOH — ECONOMIC STABILITY: FOOD INSECURITY: WITHIN THE PAST 12 MONTHS, THE FOOD YOU BOUGHT JUST DIDN'T LAST AND YOU DIDN'T HAVE MONEY TO GET MORE.: PATIENT DECLINED

## 2025-02-17 SDOH — ECONOMIC STABILITY: HOUSING INSECURITY: AT ANY TIME IN THE PAST 12 MONTHS, WERE YOU HOMELESS OR LIVING IN A SHELTER (INCLUDING NOW)?: NO

## 2025-02-17 SDOH — SOCIAL STABILITY: SOCIAL INSECURITY: ARE YOU OR HAVE YOU BEEN THREATENED OR ABUSED PHYSICALLY, EMOTIONALLY, OR SEXUALLY BY ANYONE?: NO

## 2025-02-17 SDOH — ECONOMIC STABILITY: HOUSING INSECURITY: IN THE LAST 12 MONTHS, WAS THERE A TIME WHEN YOU WERE NOT ABLE TO PAY THE MORTGAGE OR RENT ON TIME?: YES

## 2025-02-17 SDOH — ECONOMIC STABILITY: FOOD INSECURITY: WITHIN THE PAST 12 MONTHS, YOU WORRIED THAT YOUR FOOD WOULD RUN OUT BEFORE YOU GOT MONEY TO BUY MORE.: PATIENT DECLINED

## 2025-02-17 SDOH — SOCIAL STABILITY: SOCIAL INSECURITY: WITHIN THE LAST YEAR, HAVE YOU BEEN HUMILIATED OR EMOTIONALLY ABUSED IN OTHER WAYS BY YOUR PARTNER OR EX-PARTNER?: NO

## 2025-02-17 SDOH — ECONOMIC STABILITY: TRANSPORTATION INSECURITY: IN THE PAST 12 MONTHS, HAS LACK OF TRANSPORTATION KEPT YOU FROM MEDICAL APPOINTMENTS OR FROM GETTING MEDICATIONS?: YES

## 2025-02-17 SDOH — SOCIAL STABILITY: SOCIAL INSECURITY: WITHIN THE LAST YEAR, HAVE YOU BEEN AFRAID OF YOUR PARTNER OR EX-PARTNER?: NO

## 2025-02-17 SDOH — ECONOMIC STABILITY: INCOME INSECURITY: IN THE PAST 12 MONTHS HAS THE ELECTRIC, GAS, OIL, OR WATER COMPANY THREATENED TO SHUT OFF SERVICES IN YOUR HOME?: NO

## 2025-02-17 SDOH — ECONOMIC STABILITY: HOUSING INSECURITY: IN THE PAST 12 MONTHS, HOW MANY TIMES HAVE YOU MOVED WHERE YOU WERE LIVING?: 0

## 2025-02-17 SDOH — ECONOMIC STABILITY: FOOD INSECURITY: WITHIN THE PAST 12 MONTHS, THE FOOD YOU BOUGHT JUST DIDN'T LAST AND YOU DIDN'T HAVE MONEY TO GET MORE.: SOMETIMES TRUE

## 2025-02-17 SDOH — ECONOMIC STABILITY: FOOD INSECURITY: WITHIN THE PAST 12 MONTHS, YOU WORRIED THAT YOUR FOOD WOULD RUN OUT BEFORE YOU GOT THE MONEY TO BUY MORE.: OFTEN TRUE

## 2025-02-17 SDOH — ECONOMIC STABILITY: FOOD INSECURITY: HOW HARD IS IT FOR YOU TO PAY FOR THE VERY BASICS LIKE FOOD, HOUSING, MEDICAL CARE, AND HEATING?: SOMEWHAT HARD

## 2025-02-17 SDOH — ECONOMIC STABILITY: INCOME INSECURITY: IN THE LAST 12 MONTHS, WAS THERE A TIME WHEN YOU WERE NOT ABLE TO PAY THE MORTGAGE OR RENT ON TIME?: YES

## 2025-02-17 SDOH — SOCIAL STABILITY: SOCIAL INSECURITY: ARE THERE ANY APPARENT SIGNS OF INJURIES/BEHAVIORS THAT COULD BE RELATED TO ABUSE/NEGLECT?: NO

## 2025-02-17 SDOH — ECONOMIC STABILITY: TRANSPORTATION INSECURITY

## 2025-02-17 SDOH — ECONOMIC STABILITY: FOOD INSECURITY
WITHIN THE PAST 12 MONTHS, YOU WORRIED THAT YOUR FOOD WOULD RUN OUT BEFORE YOU GOT THE MONEY TO BUY MORE.: PATIENT DECLINED

## 2025-02-17 SDOH — ECONOMIC STABILITY: TRANSPORTATION INSECURITY: IN THE PAST 12 MONTHS, HAS LACK OF TRANSPORTATION KEPT YOU FROM MEDICAL APPOINTMENTS OR FROM GETTING MEDICATIONS?: NO

## 2025-02-17 SDOH — SOCIAL STABILITY: SOCIAL INSECURITY: WERE YOU ABLE TO COMPLETE ALL THE BEHAVIORAL HEALTH SCREENINGS?: YES

## 2025-02-17 SDOH — SOCIAL STABILITY: SOCIAL INSECURITY: HAS ANYONE EVER THREATENED TO HURT YOUR FAMILY OR YOUR PETS?: NO

## 2025-02-17 SDOH — SOCIAL STABILITY: SOCIAL INSECURITY: ABUSE: ADULT

## 2025-02-17 SDOH — ECONOMIC STABILITY: HOUSING INSECURITY
IN THE PAST 12 MONTHS HAS THE ELECTRIC, GAS, OIL, OR WATER COMPANY THREATENED TO SHUT OFF SERVICES IN YOUR HOME?: PATIENT DECLINED

## 2025-02-17 SDOH — SOCIAL STABILITY: SOCIAL INSECURITY: DO YOU FEEL ANYONE HAS EXPLOITED OR TAKEN ADVANTAGE OF YOU FINANCIALLY OR OF YOUR PERSONAL PROPERTY?: NO

## 2025-02-17 SDOH — ECONOMIC STABILITY: HOUSING INSECURITY: IN THE LAST 12 MONTHS, HOW MANY PLACES HAVE YOU LIVED?: 1

## 2025-02-17 SDOH — SOCIAL STABILITY: SOCIAL INSECURITY: HAVE YOU HAD THOUGHTS OF HARMING ANYONE ELSE?: NO

## 2025-02-17 SDOH — ECONOMIC STABILITY: HOUSING INSECURITY

## 2025-02-17 SDOH — SOCIAL STABILITY: SOCIAL INSECURITY: DO YOU FEEL UNSAFE GOING BACK TO THE PLACE WHERE YOU ARE LIVING?: NO

## 2025-02-17 SDOH — ECONOMIC STABILITY: GENERAL

## 2025-02-17 SDOH — SOCIAL STABILITY: SOCIAL INSECURITY: DOES ANYONE TRY TO KEEP YOU FROM HAVING/CONTACTING OTHER FRIENDS OR DOING THINGS OUTSIDE YOUR HOME?: NO

## 2025-02-17 SDOH — ECONOMIC STABILITY: FOOD INSECURITY

## 2025-02-17 ASSESSMENT — COGNITIVE AND FUNCTIONAL STATUS - GENERAL
MOBILITY SCORE: 24
DAILY ACTIVITIY SCORE: 24
MOBILITY SCORE: 24
PATIENT BASELINE BEDBOUND: NO

## 2025-02-17 ASSESSMENT — ACTIVITIES OF DAILY LIVING (ADL)
FEEDING YOURSELF: INDEPENDENT
HEARING - LEFT EAR: FUNCTIONAL
PATIENT'S MEMORY ADEQUATE TO SAFELY COMPLETE DAILY ACTIVITIES?: YES
HEARING - RIGHT EAR: FUNCTIONAL
DRESSING YOURSELF: INDEPENDENT
LACK_OF_TRANSPORTATION: NO
LACK_OF_TRANSPORTATION: YES
LACK_OF_TRANSPORTATION: NO
WALKS IN HOME: INDEPENDENT
TOILETING: INDEPENDENT
JUDGMENT_ADEQUATE_SAFELY_COMPLETE_DAILY_ACTIVITIES: YES
GROOMING: INDEPENDENT
ADEQUATE_TO_COMPLETE_ADL: YES
BATHING: INDEPENDENT

## 2025-02-17 ASSESSMENT — PATIENT HEALTH QUESTIONNAIRE - PHQ9
SUM OF ALL RESPONSES TO PHQ9 QUESTIONS 1 & 2: 2
2. FEELING DOWN, DEPRESSED OR HOPELESS: SEVERAL DAYS
1. LITTLE INTEREST OR PLEASURE IN DOING THINGS: SEVERAL DAYS

## 2025-02-17 ASSESSMENT — PAIN SCALES - GENERAL
PAINLEVEL_OUTOF10: 0 - NO PAIN
PAINLEVEL_OUTOF10: 5 - MODERATE PAIN
PAINLEVEL_OUTOF10: 10 - WORST POSSIBLE PAIN
PAINLEVEL_OUTOF10: 3
PAINLEVEL_OUTOF10: 3

## 2025-02-17 ASSESSMENT — PAIN - FUNCTIONAL ASSESSMENT
PAIN_FUNCTIONAL_ASSESSMENT: 0-10

## 2025-02-17 ASSESSMENT — LIFESTYLE VARIABLES
AUDIT-C TOTAL SCORE: 0
AUDIT-C TOTAL SCORE: 0
HOW OFTEN DO YOU HAVE 6 OR MORE DRINKS ON ONE OCCASION: NEVER
HOW OFTEN DO YOU HAVE A DRINK CONTAINING ALCOHOL: NEVER
HOW MANY STANDARD DRINKS CONTAINING ALCOHOL DO YOU HAVE ON A TYPICAL DAY: PATIENT DOES NOT DRINK
SKIP TO QUESTIONS 9-10: 1

## 2025-02-17 ASSESSMENT — SOCIAL DETERMINANTS OF HEALTH (SDOH)
IN THE PAST 12 MONTHS, HAS THE ELECTRIC, GAS, OIL, OR WATER COMPANY THREATENED TO SHUT OFF SERVICE IN YOUR HOME?: PATIENT DECLINED

## 2025-02-17 ASSESSMENT — PAIN DESCRIPTION - DESCRIPTORS: DESCRIPTORS: HEADACHE

## 2025-02-17 NOTE — PROGRESS NOTES
02/17/25 1157   Discharge Planning   Living Arrangements Spouse/significant other   Support Systems Spouse/significant other   Assistance Needed Pts  and HHA assists with ADLs, iADLS.   Type of Residence Private residence   Number of Stairs to Enter Residence 5   Number of Stairs Within Residence 0   Who is requesting discharge planning? Provider   Home or Post Acute Services In home services   Type of Home Care Services Home health aide   Expected Discharge Disposition Home Health   Does the patient need discharge transport arranged? No  ( to provide transport home.)   Financial Resource Strain   How hard is it for you to pay for the very basics like food, housing, medical care, and heating? Somewhat  (Per pt she has resourced for food assistance.)   Housing Stability   In the last 12 months, was there a time when you were not able to pay the mortgage or rent on time? N   At any time in the past 12 months, were you homeless or living in a shelter (including now)? N   Transportation Needs   In the past 12 months, has lack of transportation kept you from medical appointments or from getting medications? no   In the past 12 months, has lack of transportation kept you from meetings, work, or from getting things needed for daily living? No     Met with pt and introduced myself as Care Coordinator with Care Transitions Team for Discharge Planning. Pt stated she feels safe at home. [place].  Pt's address, phone number and contact information was verified. Pt denies any social or financial concerns at this time.  Discharge disposition: Home with resumed HHA ([facility], [facility] duty [facility]).  DME: cane, walker, shower chair, quad cane, bedside commode.  PCP: Dr. Mensah  Pharmacy: [facility] [place] [facility]     Marisol Mario, RN, BSN  Transitional Care Coordinator   Office: [phone]  Secure chat via [employer]iku

## 2025-02-17 NOTE — HOSPITAL COURSE
Jonathan Ayala is a 49 y.o. 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 10/30/2024 s/p R VPS exploration w abdominal catheter repositioning w improvement in distal runnoff, 11/15/2024 s/p RF shunt wound revision and fractured valve replacement, 12/10 s/p R cranial wound exploration, washout, revision, 1/28 p/w drainage from shunt site, cough, vision changes, 1/28 s/p LP (OP31), 1/30 s/p valve exchange (certas at 4), 2/15 p/w HA, vision changes, shunt pain, nausea, sweats/chills, tremors, unsteadiness for 2d, CTH stable, SS intact      2/16 s/p LP (OP 26), certas dialed down to 3  2/17 MRI blitz protocol patent  2/18 neurology consulted for visual hallucinations, will need to follow up outpatient    On the day of discharge, the patient was seen and evaluated by the neurosurgery team and deemed suitable for discharge. The patient was given detailed discharge instructions and were scheduled to follow up as an outpatient.

## 2025-02-17 NOTE — CARE PLAN
The patient's goals for the shift include Pain management    The clinical goals for the shift include Patient will remain safe by the end of the shfit    Over the shift, the patient did not make progress toward the following goals. Barriers to progression include uncontrolled pain. Recommendations to address these barriers include continuing with plan of care.      Problem: Fall/Injury  Goal: Not fall by end of shift  Outcome: Progressing  Goal: Be free from injury by end of the shift  Outcome: Progressing  Goal: Verbalize understanding of personal risk factors for fall in the hospital  Outcome: Progressing  Goal: Verbalize understanding of risk factor reduction measures to prevent injury from fall in the home  Outcome: Progressing

## 2025-02-17 NOTE — PROGRESS NOTES
Assessment and Plan    06/08/2021 +OKN response OD  09/30/2019 +OKN response OD    02/15/2025 CT head without contrast, which I personally reviewed, shows right frontal approach ventriculoperitoneal shunt catheter.    Interval head imaging.    12/09/2024 CT head without contrast, which I personally reviewed previously, shows the right frontal ventriculoperitoneal shunt catheter.  11/15/2024 CT head without contrast, which I personally reviewed previously, shows the right frontal ventriculoperitoneal shunt catheter.  Interval head imaging.  10/29/2024 CT head without contrast, which I personally reviewed previously, shows the right frontal ventriculoperitoneal shunt catheter.  10/23/2024 MRI brain & orbits with contrast, which I personally reviewed previously, shows the right frontal ventriculoperitoneal shunt catheter.  10/23/2024 CT head without contrast, which I personally reviewed previously, shows the right frontal ventriculoperitoneal shunt catheter.  09/24/2024 CT head without contrast, which I personally reviewed previously, shows interval placement of a right frontal approach ventriculoperitoneal shunt.  09/23/2024 CT head without contrast, which I personally reviewed previously, shows no lesion.  09/21/2024 MRI brain without contrast, which I personally reviewed previously, shows stable findings.  09/21/2024 CT head without contrast & CTA head & neck, which I personally reviewed previously, shows the right frontal encephalomalacia.  09/17/2024 MRI brain & orbits with contrast & MRV head, which I personally reviewed previously, show stable right frontal encephalomalacia.  09/02/2024 CT head without contrast, which I personally reviewed previously, shows right frontal encephalomalacia stable from prior.  08/28/2024 CTA head and neck & CT head without contrast, which I personally reviewed previously, show right frontal encephalomalacia stable from prior.  08/06/2024 CTA head & neck & CT head without contrast,  "which I personally reviewed previously, show right frontal encephalomalacia stable from prior.  07/09/2024 CT head without contrast, which I personally reviewed previously, shows stable right frontal encephalomalacia.  06/05/2024 MRI orbits with contrast, which I personally reviewed previously, shows right frontal encephalomalacia similar to prior imaging.  11/07/2023 MRI brain without contrast, by report from [facility], shows \"No acute intracranial abnormality including no evidence of an acute or recent brain parenchymal infarct. \"  11/07/2023 CTA head & neck & CT head without contrast, by report from [facility], show \"No acute abnormality of the cervical and intracranial vasculature.\" And \"No evidence of an acute intracranial process.  Focal RIGHT frontal lobe encephalomalacia deep to a sherley hole, new from 02/25/2020. \"  08/01/2023 MRV head, which I personally reviewed previously, shows no lesion.  07/29/2023 MRI brain & orbits with contrast, which I personally reviewed previously, shows right frontal encephalomalacia consistent with prior bolt.  Interval head imaging.  10/05/2022 CT head without contrast & CTA head & neck, by report from [place], show \" 1.   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  Overlying right-sided sherley hole.)  2.  Prominence of the ventricles and sulci for age.  \" and \"1. Similar appearing old areas of infarction involving the right and left frontal lobes extending to the convexities with underlying encephalomalacia at site of prior hemorrhage from 02/13/2022. Overlying right-sided sherley hole. Prominence of the ventricles and sulci for age. No evidence of acute infarction. No active hemorrhage. 2. No significant stenosis internal carotid arteries. 3. No significant stenosis of the intracranial vessels or evidence of " "aneurysm. 4. Aberrant right subclavian artery behind the esophagus with no dilation of the proximal esophagus.\"  09/25/2022 CT head without contrast, which I personally reviewed previously, shows no lesion.  04/20/2022 CT head without contrast, which I personally reviewed previously, shows right frontal encephalomalacia judged stable by Radiology.  Interval head imaging.  09/10/2021 MRI brain with contrast, which I personally reviewed previously, shows no lesion.  09/09/2021 CTA head & neck, which I personally reviewed previously, shows no lesion.  09/09/2021 CT head without contrast, which I personally reviewed previously, shows no lesion.  08/11/2021 MRI brain & orbits with contrast & MRV head, which I personally reviewed previously, show left optic atrophy with Radiology noting “Punctate focus of enhancement seen along the left 7th-8th cranial nerve bundle at the level of the porus acusticus, indeterminate. Otherwise unremarkable MRI of the brain.”  Interval head imaging.  06/04/2021 MRI brain & orbits with contrast, which I personally reviewed previously, shows left optic atrophy.  Interval head imaging.  06/29/2017 MRI brain without contrast, which I personally reviewed previously, shows no lesion.  11/22/2007 CT head without contrast, which I personally reviewed previously, shows no lesion.    02/16/2025 tube 1: RBC 51233, WBC 10, tube 4: RBC 24500 & WBC 17, protein 143 & glucose 87.  Interval cerebrospinal fluid (CSF) studies.  11/15/2024 RBC 3, WBC 1, protein 67 & glucose 92.  11/07/2024 , WBC 6, protein 69 & glucose 107.  09/18/2024 lumbar puncture tube 1: RBC 1000, WBC 6, tube 4:  & WBC 5, protein 79 & glucose 154. IgG studies with high albumin and albumin index with high cerebrospinal fluid (CSF) IgG/albumin ratio. Oligoclonal bands negative. Cytology negative. Flow cytometry negative. Encephalopathy autoimmune evaluation negative. Meningitis pathogen panel, HSV PCR, VZV PCR, EBV PCR, " toxoplasma PCR, West Nile IgG & IgM, VDRL, fungal smear negative.  08/31/2023 lumbar puncture opening pressure 52 cm water, tube 1: , WBC 0, tube 4: RBC 6 & WBC 0, protein 91 & glucose 71.  08/11/2021 lumbar puncture opening pressure 18 cm water, tube 1: RBC 0, WBC 16 (86% L, 13% M), tube 4: RBC 0 & WBC 16 (92% L, 8% M), protein 71 & glucose 61. Cytology negative. Flow cytometry negative. Oligoclonal bands 0. IgG studies with high CSF IgG & albumin.  08/05/2020 lumbar puncture opening pressure 20 cm water, protein 68, glucose 85. MBP wnl. Oligoclonal bands POSITIVE 4.  12/26/2019 lumbar puncture no opening pressure checked per patient) tube ?: RBC 39, WBC 6 (91% L, 9% M), tube ?: RBC 38, WBC 5, protein 89, glucose 79. (via [employer] from [employer]  11/13/2019 lumbar puncture opening pressure 22 cm water, tube 1: RBC 9, WBC 4, tube 4: RBC 1 & WBC 5, protein 82 & glucose 61. Oligoclonal bands 0. IgG studies with non-specific abnormalities. HSV PCR negative.  09/20/2019 lumbar puncture opening pressure 20 cm water, RBC 1, WBC 7 (96% L, 4% M), protein 94 & glucose 78.  01/31/2015 lumbar puncture RBC 2, WBC 0, protein 104 & glucose 74.    08/18/2024 Electrodiagnostic testing.  ffERG: Normal (OU).  Responses of L-/M-cones and S-cones: No abnormality can be detected (OU).  Responses of On- and Off-bipolar cells in cone pathway: Normal (OU).  PhNR (RGC function):  OD: Amplitude reduces mildly and implicit time prolongs mildly.  OS: Amplitude reduces moderately and implicit time prolongs mildly.  mfERG: No abnormality can be found (OU).  PVEP:  OD: Normal.  OS: Amplitude reduces severely.  H[place] PVEP:  OD: Left-field PVEP amplitude is significantly lower than right-field PVEP.  OS: PVEP of both sides show no detectable components.  07/27/2019 multifocal ERG with mildly reduced central responses.  Pattern VEP with OD borderline latency at 0.25 degrees and OS with severely prolonged latencies and decreased  amplitudes.    Lab Results   Component Value Date/Time    SEDRATE 31 (H) 02/15/2025 2014    SEDRATE 24 (H) 01/28/2025 1108    SEDRATE 35 (H) 01/22/2025 2357    SEDRATE 52 (H) 01/08/2025 1707    SEDRATE 40 (H) 12/09/2024 1615    SEDRATE 21 (H) 11/15/2024 0552    SEDRATE 36 (H) 11/06/2024 2258    SEDRATE 19 08/01/2023 1558    SEDRATE 33 (H) 08/07/2022 0322    SEDRATE 19 05/25/2022 1501    SEDRATE 3 06/05/2020 1110    SEDRATE 6 05/13/2020 1529    SEDRATE 3 03/28/2020 0629    SEDRATE 5 09/16/2019 0507    SEDRATE 8 08/19/2019 1314    CRP 0.16 02/15/2025 2014    CRP 0.34 01/28/2025 1108    CRP 0.43 01/22/2025 2357    CRP 0.54 01/08/2025 1707    CRP 1.00 (H) 12/09/2024 1615    CRP 1.19 (H) 11/15/2024 0552    CRP 1.36 (H) 11/06/2024 2258    CRP 0.90 08/07/2022 0322    CRP 0.35 05/25/2022 1501    CRP 0.34 09/23/2021 0618    CRP 0.71 09/21/2021 0648    CRP 0.14 07/16/2020 0944    CRP 0.10 06/05/2020 1110    CRP <0.10 05/13/2020 1529    CRP <0.10 03/28/2020 0629    CRP 3.64 (A) 11/21/2019 2157    CRP <0.10 08/19/2019 1314      Lab Results   Component Value Date/Time    WLWVNNQW73 383 09/17/2024 0242    QJQTKCKY07 299 02/23/2023 0941    PWKCGQQF57 709 02/09/2021 0451    CGDFRTER31 508 12/10/2019 1349    FOLATE >24.0 09/17/2024 0242    RCFOL 951 12/10/2019 1349    KUUT4CA 142 09/17/2024 0250    VTAI Normal 09/17/2024 0242    VITAMINA 0.53 09/17/2024 0242    VTAR 0.02 09/17/2024 0242      Lab Results   Component Value Date/Time    NMOAQP4 Negative 09/17/2024 0242    NMOAQP4 Negative 11/14/2019 1447    MOGFACS Negative 09/17/2024 0242    MOGFACS Negative 11/10/2020 1000    MOGFACS Negative 11/14/2019 1447    ACE 41 09/17/2024 0242      Tuberculosis studies  Lab Results   Component Value Date/Time    TBSIN Negative 09/17/2024 0250    TBSIN Negative 09/22/2021 0430    TBSIN Negative 11/10/2020 1000    TBSIN Negative 11/14/2019 0531      Lab Results   Component Value Date/Time    ZQUICUOA75 383 09/17/2024 0242    KNNSUMAA13 299  02/23/2023 0941    ZMKDYTVC75 709 02/09/2021 0451    LWEIUQSQ79 508 12/10/2019 1349    FOLATE >24.0 09/17/2024 0242    RCFOL 951 12/10/2019 1349    UXXC7HU 142 09/17/2024 0250    VTAI Normal 09/17/2024 0242    VITAMINA 0.53 09/17/2024 0242    VTAR 0.02 09/17/2024 0242      09/17/2024 syphilis REACTIVE. Treponema confirm & RPR non-reactive (NR). T-SPOT TB negative. Bartonella abs, Lyme PCR, RMSF abs, Toxoplasma IgG & IgM negative.  12/04/2023 ESR 10. CRP <0.3 mg/dL.  10/01/2023 syphilis non-reactive (NR).  09/18/2020 ESR 1. CRP < 0.08 mg/dL (0.8 mg/L).  08/28/2020 HbA1c HIGH 7.0. Lipid panel with LDL 64.  08/05/2020 B12 462. Folate wnl. AQP4 ab negative.  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 Suarez.  07/09/2019 BRETT > 1:640. Anti-centromere HIGH 101 (0-40). scl-70 negative. Chromatin ab IgG, anti-ribosomal ab, anti-Sarahy ab, anti-DNA ab negative.    12/16/2024 OCT RNFL OD 48 & OS 38.  11/05/2024 OCT RNFL OD 81 with T & I thinning & OS 37. (Lower OD & stable OS)  10/23/2024 OCT RNFL  & OS 41. (Lower OD & stable OS)  10/03/2024 OCT RNFL  & OS 39. (Lower OD & stable OS)  09/16/2024 OCT RNFL  & OS 36. (Interval new elevation OD & stable thinning OS)  07/25/2024 OCT RNFL OD 89 & OS 37. (Decrease, now at baseline of early 2024)  06/26/2024 OCT RNFL OD 95 & OS 41. (Stable)  06/05/2024 OCT RNFL OD 95 & OS 40. (Stable)  OCT macula OD normal foveal contour 264 & OS normal foveal contour 234.  03/15/2024 OCT RNFL OD 92 & OS 36. (Stable)  01/09/2024 OCT RNFL OD 89 & OS 36. (Stable)  02/06/2023 OCT RNFL OD 92 & OS 38. (stable)  OCT macula OD normal foveal contour 255 & OS thinning RNFL miscentered wrt fovea.  10/07/2022 OCT RNFL OD 92 & OS 40. (decreased OD & stable OS)  09/23/2022 OCT RNFL OD 98 & OS 38. (increased OD & stable OS)  01/27/2022 OCT RNFL OD 88 & OS 37. (stable)  10/06/2021 OCT RNFL OD 93 & OS 39 (stable)..  08/19/2021 OCT RNFL OD 92 & OS 38. (stable)  06/08/2021 OCT  RNFL OD 87 & OS 37. (stable)  01/28/2021 OCT RNFL OD 85 & OS 37. (stable)  11/06/2020 OCT RNFL OD 89 & OS 39. (stable)  07/27/2020 OCT RNFL OD 89 & OS 38. (stable)  01/07/2020 OCT RNFL OD 93 & OS 38. (stable to mild increase OD, stable to mild decrease OS)  11/27/2019 OCT RNFL OD 84 & OS 42. (stable to mild OD decrease)  09/30/2019 OCT RNFL OD 89 & OS 41. (stable)  07/18/2019 OCT RNFL OD 90 & OS 39.  OCT macula OD normal foveal contour 256 & OS thinning of RNFL & GCL, but preserved foveal contour 238.    12/16/2024 HVF 24-2 OD stimulus V, fovea <0, FL 7/12, FL 0/6, FN 1/5, generalized depression with some relative superior temporal sparing & OS stimulus V, fovea 9, FL 0/16, FP 0/10, FN 6/9, generalized depression.  11/05/2024 HVF 24-2 OD stimulus V, fovea <0, inferior altitudinal & superior arcuate (some superior temporal sparing) & OS stimulus V, fovea 22, central, superior nasal step & inferior arcuate defects.  10/03/2024 HVF 24-2 OD fovea 4, generalized depression MD -29.75 & OS stimulus V, fovea 12, central, superior nasal step & inferior arcuate defects.  09/16/2024 HVF 24-2 OD stimulus III, fovea 14, generalized depression MD -31.43 & OS stimulus V, fovea 1, generalized depression with some superior temporal sparing.  07/25/2024 HVF 24-2 OD stimulus V, fovea 39, superior > inferior nasal step & OS stimulus V, fovea 18, generalized depression with some superior temporal sparing (inferior > superior altitudinal.  06/26/2024 HVF 24-2 OD fovea 33, FL 3/15, FP 0%, FN 20%, superior arcuate MD -13.69 & OS stimulus V, fovea 27, generalized depression.  06/05/2024 HVF 24-2 OD fovea 34, FL 2/16, FP 0%, FN 40%, superior altitudinal MD -18.00 & OS stimulus V, fovea 25, generalized depression.  03/15/2024 HVF 24-2 OD fovea 36, wnl MD -1.05 & OS stimulus V, fovea 3, inferior arcuate > superior arcuate.  01/09/2024 HVF 24-2 OD wnl MD -0.94 & OS generalized depression MD -32.10.  02/06/2023 HVF 24-2 OD fovea 36, FL 1/17, FP  0%< FN 15%, scatter MD -7.58 & OS stimulus V, fovea 19, generalized depression.  10/07/2022 HVF 24-2 OD fovea 35, wnl MD -1.26 & OS fovea 5, generalized depression MD -30.62.  09/23/2022 HVF 24-2 OD fovea 38, scatter MD -2.67 & OS stimulus V, fovea 16, superior arcuate & inferior arcuate.  01/27/2022 HVF 24-2 OD fovea 36, wnl MD -1.67 & OS stimulus V, fovea 28, generalized reduction.  10/06/2021 HVF 24-2 OD fovea 36, scatter MD -4.76 & OS stimulus V, generalized reduction.  08/19/2021 HVF 24-2 OD fovea 39, wnl MD -2.31 & OS stimulus V, fovea 28, generalized depression.  06/08/2021 HVF Stimulus V OD fovea 34, wnl & OS stimulus V, fovea 24, generalized depression.  01/28/2021 HVF 24-2 OD fovea 40, wnl MD -0.63 & OS stimulus V, fovea 28, superior nasal step & inferior arcuate.  11/06/2020 HVF 24-2 OD fovea 32, possible inferior > superior arcuate MD -14.71 & OS stimulus V, fovea 10, generalized depression.  07/27/2020 HVF 24-2 OD fovea 39, wnl MD -2.45 & OS stimulus V, fovea 27, superior & inferior altitudinal.    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    She presented to the [facility] ED [facility]/15/2025 with concern for infection.    The near vision remains about the same as in the hospital. The optic disc edema that had resolved as evolved into pallor, the expected change after edema. The question remains about the relative contributions of non-arteritic anterior ischemic optic neuropathy and papilledema from intracranial pressure elevation related to idiopathic intracranial hypertension.      Plan    Shunt care with Dr. Tafoya.  Continue furosemide with possible tapering in a few months.  Vasculopathic risk factor control.  Low vision services.    Follow up in 2-3 months with [facility]pe visual field (HVF) & OCT. (dilated 6/5/2024)

## 2025-02-17 NOTE — PROGRESS NOTES
"Jonathan Ayala is a 49 y.o. female on day 1 of admission presenting with Chronic nonintractable headache, unspecified headache type.    Subjective   Patient feels headache and pressure behind eyes is better with shunt dial down, neck pain along shunt tract is still present       Objective     Physical Exam  Aox3  FC x 4 5/5  Incision c/d/I    Last Recorded Vitals  Blood pressure 133/76, pulse 96, temperature 36.6 °C (97.9 °F), temperature source Oral, resp. rate 16, height 1.575 m (5' 2\"), weight 109 kg (240 lb), SpO2 94%.  Intake/Output last 3 Shifts:  No intake/output data recorded.    Relevant Results                              Assessment/Plan   Assessment & Plan  Chronic nonintractable headache, unspecified headache type    49yF h/o IIH (dx 2/2022 w/ OP 25) s/p RF bolt c/b tract hemorrhage and focal epilepsy, right hemiplegic migraines, CVID on monthly IVIG infusions, Sjogren's syndrome/scleroderma, POTS, T2DM, HTN, hypothyroidism, BRENT on CPAP, anxiety/depression, left non-arteritic anterior ischemic optic neuropathy with b/l sequential vision loss 10/30/2024 s/p R VPS exploration w abdominal catheter repositioning w improvement in distal runnoff, 11/15/2024 s/p RF shunt wound revision and fractured valve replacement, 12/10 s/p R cranial wound exploration, washout, revision, 1/28 p/w drainage from shunt site, cough, vision changes, 1/28 s/p LP (OP31), 1/30 s/p valve exchange (certas at 4), 2/15 p/w HA, vision changes, shunt pain, nausea, sweats/chills, tremors, unsteadiness for 2d, CTH stable, SS intact     2/16 s/p LP (OP 26), certas dialed down to 3    Plan:  Floor  MRI blitz protocol to assess for shunt obstruction (ordered)  Appreciate ophtho recs - no papilledema   Fu cultures   Continue moxifloxacin for PNA  Further plans pending imaging           Lauryn Rashid MD      "

## 2025-02-17 NOTE — PROGRESS NOTES
Pharmacy Medication History Review    Jonathan Ayaal is a 49 y.o. female admitted for Chronic nonintractable headache, unspecified headache type. Pharmacy reviewed the patient's ffycf-oj-npwrxlyta medications and allergies for accuracy.    The list below reflects the updated PTA list.   Prior to Admission Medications   Prior to Admission Medications   Prescriptions Last Dose Informant   Advair -21 mcg/actuation inhaler  Self   Sig: INHALE TWO PUFFS BY MOUTH AS INSTRUCTED TWO TIMES A DAY.   Dexcom G7  misc  Self   Sig: Use as instructed to check blood sugars continuously throughout the day   EPINEPHrine 0.3 mg/0.3 mL injection syringe  Self   Sig: INJECT INTRAMUSCULARLY ONCE AS NEEDED FOR ANAPHYLAXIS   Motegrity 2 mg tablet  Self   Sig: Take 1 tablet (2 mg) by mouth once daily.   OneTouch Delica Plus Lancet 33 gauge misc  Self   Sig: USE 1 LANCET TO CHECK GLUCOSE ONCE DAILY AS DIRECTED   OneTouch Verio test strips strip  Self   Sig: USE 1 STRIP TO CHECK GLUCOSE ONCE DAILY AS DIRECTED   ZINC ORAL  Self   Sig: Take 50 mg by mouth once daily.   acetaminophen (Tylenol) 325 mg tablet  Self   Sig: Take 1-2 tablets (325-650 mg) by mouth every 6 hours if needed for mild pain (1 - 3). Days of infusions   amitriptyline (Elavil) 100 mg tablet  Self   Sig: Take 1 tablet (100 mg) by mouth once daily at bedtime.   azelastine (Astelin) 137 mcg (0.1 %) nasal spray  Self   Sig: Administer 1 spray into each nostril 2 times a day. Use in each nostril as directed   blood-glucose sensor (DEXCOM G7 SENSOR MISC)  Self   Sig: Use to check blood sugar continuously throughout the day as directed. Change sensor every 10 days.   calcium-vitamin D3-vitamin K (Viactiv) 650 mg-12.5 mcg-40 mcg chewable tablet  Self   Sig: Chew 2 tablets once daily. At lunch   carboxymethylcellulose (Refresh Celluvisc) 1 % ophthalmic solution dropperette  Self   Sig: Administer 2 drops into both eyes 3 times a day as needed for dry eyes.    cholecalciferol (Vitamin D-3) 5,000 Units tablet  Self   Sig: Take 1 tablet (5,000 Units) by mouth once daily.   clonazePAM (KlonoPIN) 0.5 mg tablet  Self   Sig: Take 1 tablet (0.5 mg) by mouth 2 times a day.   divalproex (Depakote ER) 500 mg 24 hr tablet  Self   Sig: Take 1 tablet (500 mg) by mouth 2 times a day.   ezetimibe (Zetia) 10 mg tablet  Self   Sig: Take 1 tablet (10 mg) by mouth once daily.   fluconazole (Diflucan) 150 mg tablet  Self   Si tablet by mouth daily spread 72 hours apart   fluticasone (Flonase) 50 mcg/actuation nasal spray  Self   Sig: USE 1 SPRAY INTO EACH NOSTRIL ONCE DAILY.   folic acid (Folvite) 1 mg tablet  Self   Sig: Take 1 tablet (1 mg) by mouth once daily.   furosemide (Lasix) 20 mg tablet  Self   Sig: Take 1 tablet (20 mg) by mouth 2 times a day.   gloves, latex with aloe vera misc  Self   Sig: Large size gloves   glucagon (Baqsimi) 3 mg/actuation spray,non-aerosol  Self   Sig: USE ONE SPRAY IN THE NOSE AS NEEDED FOR LOW BLOOD SUGAR  MAY REPEAT AFTER 15 MINUTES USING A NEW DEVICE IF NO RESPONSE   glucagon (Glucagen) 1 mg injection  Self   Sig: Inject 1 mg under the skin 1 time if needed for low blood sugar - see comments (hypoglycemia).   hydrocortisone (Cortef) 10 mg tablet  Self   Sig: Take 1 tablet (10 mg) by mouth 2 times a day. Double the dose if sick for three days   immune globulin, human, (Gammagard) infusion 25 Self   Sig: Infuse 600 mL (60 g) into a venous catheter every 14 (fourteen) days.   insulin glargine (Toujeo Max Solostar- 2 unit dial) 300 unit/mL (3 mL) injection  Self   Sig: Inject 54 units under the skin every morning   insulin lispro (HumaLOG KwikPen Insulin) 100 unit/mL injection  Self   Sig: Use as directed to give up to 100 units a day   ipratropium-albuteroL (Duo-Neb) 0.5-2.5 mg/3 mL nebulizer solution  Self   Sig: Inhale 3 mL 4 times a day as needed.   lacosamide (Vimpat) 150 mg tablet tablet  Self   Sig: Take 2 tablets (300 mg) by mouth 2 times  "a day.   lasmiditan (Reyvow) 100 mg tablet  Self   Sig: Take 1 tablet (100 mg) by mouth if needed for migraine. Do not drive in 8 hours of taking this medicine. Maximum 1 dose per 24 hours.   levETIRAcetam (Keppra) 750 mg tablet  Self   Sig: Take 2 tablets (1,500 mg) by mouth 2 times a day.   levothyroxine (Synthroid, Levoxyl) 50 mcg tablet  Self   Sig: Take 1.5 tablets (75 mcg) by mouth once daily in the morning. Take before meals.   methylPREDNISolone (Medrol) 4 mg tablet  Self   Si tabs daily 5 days , 7 tabs daily 5 days , 6 tabs daily 5 days , 5 tabs daily 5 days , 4 tabs daily 5 days , 3 tablets daily 5 days 2 tabs daily 5 days , 1 tab daily 5 days per Nay PharmD [employer] [place].    Patient current dose in taper is 4 tablets daily 1st day per patient    miconazole (Micotin) 2 % cream  Self   Sig: Apply 1 Application topically once daily as needed (rash).   miconazole (Micotin) 2 % powder  Self   Sig: Apply to groin , under the breast and skin folds daily   moisturizing mouth (Biotene Oral Dry Mouth) solution  Self   Sig: Take 1 spray by mouth 4 times a day as needed.   montelukast (Singulair) 10 mg tablet  Self   Sig: TAKE ONE TABLET BY MOUTH EVERY DAY AT BEDTIME   moxifloxacin (Avelox) 400 mg tablet  Self   Sig: Take 1 tablet (400 mg) by mouth early in the morning..   nasal spray Nayzilam 5 mg/spray (0.1 mL) spray,non-aerosol  Self   Sig: Administer into affected nostril(s) 1 time.   ondansetron ODT (Zofran-ODT) 4 mg disintegrating tablet  Self   Sig: Take 1 tablet (4 mg) by mouth every 8 hours if needed for nausea or vomiting.   pantoprazole (Protonix) 40 mg EC tablet  Self   Sig: Take 1 tablet (40 mg) by mouth 2 times a day before meals.   pen needle, diabetic (BD Lela 2nd Gen Pen Needle) 32 gauge x \" needle  Self   Sig: Use as directed with insulin pen up to 5 times daily   polyethylene glycol (Glycolax, Miralax) 17 gram/dose powder  Self   Sig: Mix 17 g of powder and drink 3 times a day as " needed for constipation.   propranolol LA (Inderal LA) 60 mg 24 hr capsule  Self   Sig: Take 1 capsule (60 mg) by mouth once daily. Do not crush, chew, or split.   rOPINIRole (Requip) 0.5 mg tablet  Self   Sig: Take 1 tablet by mouth (0.5mg) in the morning and 2 tablets (1mg) by mouth in the evening   senna 8.6 mg tablet  Self   Sig: Take 1 tablet (8.6 mg) by mouth once daily at bedtime.   tiZANidine (Zanaflex) 2 mg tablet  Self   Sig: Take 1 tablet (2 mg) by mouth 2 times a day.   ubrogepant (Ubrelvy) 100 mg tablet tablet  Self   Sig: Take 1 tablet (100 mg) by mouth if needed (migraine). May repeat in 2 hours for max of 200mg per 24 hours.      Facility-Administered Medications: None       Facility-Administered Medications: None        The list below reflects the updated allergy list. Please review each documented allergy for additional clarification and justification.  Allergies  Reviewed by Nicole Parra on 2/15/2025        Severity Reactions Comments    Acetazolamide High Hives, Rash, Shortness of breath, Swelling oral hives, redness, ABD pain    Atorvastatin High Unknown Causes muscle pain    Cefdinir High Itching, Rash, Shortness of breath, Anaphylaxis Itchy throat Throat closing / difficulty breathing.  Took Benadryl at home. Itchy throat      Throat closing / difficulty breathing.  Took Benadryl at home.    Ceftriaxone High Anaphylaxis, Rash, Shortness of breath, Swelling SOB SOB hives turned red during gallbladder    Doxepin High Anaphylaxis, Hives, Swelling, Angioedema, Rash Itchy sore throat problems with swallowing facial swelling Itchy sore throat problems with swallowing      facial swelling    Duloxetine High Anaphylaxis, Hallucinations, Rash suicidal ideation suicidal ideation  Other reaction(s): suicidal ideation suicidal Suicidal ideation    Fd And C Red No.40 High Anaphylaxis     Levofloxacin High Angioedema, Swelling, Rash eyelid swelling eyelid swelling. Patient tolerates Avelox    Levofloxacin  "In D5w High Anaphylaxis [place] face lips swelling just Levaquin tolerated D5W)    Nutritional Supplements High Anaphylaxis Grapes ( red dye    Ozempic [semaglutide] High Nausea/vomiting Severe gastroparesis worsening     Prochlorperazine High Anaphylaxis HIGH Compazine involuntary muscle spasms low doses tolerated)    Red Dye High Anaphylaxis     Becky High Anaphylaxis, Swelling Rosemary SOB facial swelling    Becky Oil High Anaphylaxis, Itching, Swelling Rosemary  SOB facial swelling      SOB facial swelling    Strawberry High Shortness of breath White blisters in mouth      Other reaction(s): white blisters in mouth, shortness of breath    Sulfa (sulfonamide Antibiotics) High Anaphylaxis, Rash, Shortness of breath, Swelling SOB itchy hives Other Reaction(s): rash, SOB      SOB itchy hives    Topiramate High Anaphylaxis, Swelling, Angioedema eyelid swelling Throat swelling eyelid swelling  Throat swelling      Throat swelling      eyelid swelling    Tree Pollen-black [place] High Shortness of breath Other Reaction(s): Other: See Comments      White blisters in mouth      blisters in mouth-carries an EPIPEN-\"I have gotten short of breath\"    Tree Pollen-pecan High Shortness of breath pecans    [place]Carley High Shortness of breath     Aripiprazole Medium Unknown, Fever, Other fever and tremors Fevers and Tremors Tremor    Aspartame Medium Diarrhea Blood sugar [place], Diarrhea    Aspartame (bulk) Medium Diarrhea, Nausea Only, Nausea/vomiting Other Reaction(s): nausea, diarrhea, abdominal pain      Blood sugar [place], Diarrhea    Fenofibrate Medium Other Double vision    Gluten Medium Itching, Other, Rash Rashes constipation gastric discomfort    Hydrochlorothiazide Medium Hives, Itching, Rash hctz removed as free-text allergy and entered as [facility] allergy,1455 10/6/2007JO      itchy hives    Hydromorphone Medium Unknown, GI intolerance, Nausea Only Intolerance nausea instant    Iron Medium Hives, Rash ichy hive ( can " tolerate ferrous gluconate)    Meclizine Medium Angioedema, Other, Swelling eyelid swelling Swelling of the eyelids Eyelids and face    Metformin Medium Other Other reaction(s): Dyspepsia, Dyspepsia    Propoxyphene Medium Unknown, Hives Darvocet itchy hives    Statins-hmg-coa Reductase Inhibitors Medium Unknown Double vision    Tetracyclines Medium Hives, Itching, Rash sulfa Rash itching Itchy  rash    Thiazides Medium Hives, Itching, Rash itchy hives    Venlafaxine Medium Unknown, Fever Fevers chills    Wheat Medium Unknown oral blisters    Adhesive Tape-silicones Not Specified Itching, Other     Barbiturates Not Specified Unknown     Ciprofloxacin Not Specified Hives     Dhe Not Specified Unknown Raynaud's disease    Diet Foods Not Specified Diarrhea Aspartame allergy only. Removes to many foods to interface.    Farxiga [dapagliflozin] Not Specified Other Frequent yeast infections and UTI    Ferrous Sulfate Not Specified Hives     Keflex [cephalexin] Not Specified Itching     L.acidoph-l.bulg-b.bif-s.therm Not Specified GI intolerance     Milk Containing Products (dairy) Not Specified GI Upset     Nsaids (non-steroidal Anti-inflammatory Drug) Not Specified Unknown, Nausea/vomiting     Other Not Specified Unknown     Sulfonylureas Not Specified Unknown     Tobramycin Not Specified Unknown     Vancomycin Not Specified Unknown, Hives Niyah syndrome Other reaction(s): Other Red man syndrome if given fast, benadryl with.  Niyah syndrome  Other reaction(s): Other      Other reaction(s): Other      Red man syndrome if given fast, benadryl with.    Adhesive Low Rash     Azithromycin Low Hives, Itching, Rash     Betamethasone Low Nausea And Vomiting, Other, GI intolerance, Diarrhea N/V/D    House Dust Low Rash     Metoclopramide Hcl Low Other     Prednisone Low Rash     Propoxyphene-acetaminophen Low Hives, Rash     Sulfacetamide Sodium Low Rash, Swelling     [id number] Antimigraine Agents Low Nausea Only None due to  Raynaud's  ( Triptans cause a stroke)    Zolpidem Low Other, Hallucinations Sleep walk Other Reaction(s): insomnia and agitation      Sleep walk            Patient accepts M2B at discharge. [facility]    Sources used to complete the med history include:    [facility]  Pharmacy dispense history  Patient interview Good historian  Chart Review  Care Everywhere     Below are additional concerns with the patient's PTA list.  Patient said using both hydrocortisone and methylprednisolone together.         Medications ADDED:  Methylprednisolone   Moxifloxacin    Medications CHANGED:  None  Medications REMOVED:   None     Jimmy Awad [facility].   Transitions of Care Pharmacist  [facility]  Please reach out via Secure Chat for questions, or if no response call Lalina or [facility]

## 2025-02-18 VITALS
TEMPERATURE: 97.2 F | HEIGHT: 62 IN | SYSTOLIC BLOOD PRESSURE: 128 MMHG | OXYGEN SATURATION: 91 % | WEIGHT: 240 LBS | BODY MASS INDEX: 44.16 KG/M2 | DIASTOLIC BLOOD PRESSURE: 72 MMHG | HEART RATE: 88 BPM | RESPIRATION RATE: 15 BRPM

## 2025-02-18 LAB
GLUCOSE BLD MANUAL STRIP-MCNC: 167 MG/DL (ref 74–99)
LACOSAMIDE SERPL-MCNC: 14.4 UG/ML (ref 1–10)

## 2025-02-18 PROCEDURE — 99255 IP/OBS CONSLTJ NEW/EST HI 80: CPT

## 2025-02-18 PROCEDURE — 2500000001 HC RX 250 WO HCPCS SELF ADMINISTERED DRUGS (ALT 637 FOR MEDICARE OP): Mod: SE | Performed by: STUDENT IN AN ORGANIZED HEALTH CARE EDUCATION/TRAINING PROGRAM

## 2025-02-18 PROCEDURE — 2500000004 HC RX 250 GENERAL PHARMACY W/ HCPCS (ALT 636 FOR OP/ED): Mod: SE | Performed by: NEUROLOGICAL SURGERY

## 2025-02-18 PROCEDURE — 99239 HOSP IP/OBS DSCHRG MGMT >30: CPT

## 2025-02-18 PROCEDURE — 2500000001 HC RX 250 WO HCPCS SELF ADMINISTERED DRUGS (ALT 637 FOR MEDICARE OP): Mod: SE

## 2025-02-18 PROCEDURE — 82947 ASSAY GLUCOSE BLOOD QUANT: CPT

## 2025-02-18 PROCEDURE — 2500000002 HC RX 250 W HCPCS SELF ADMINISTERED DRUGS (ALT 637 FOR MEDICARE OP, ALT 636 FOR OP/ED): Mod: SE

## 2025-02-18 PROCEDURE — 2500000004 HC RX 250 GENERAL PHARMACY W/ HCPCS (ALT 636 FOR OP/ED): Mod: SE

## 2025-02-18 RX ORDER — SCOPOLAMINE 1 MG/3D
1 PATCH, EXTENDED RELEASE TRANSDERMAL
Status: DISCONTINUED | OUTPATIENT
Start: 2025-02-18 | End: 2025-02-18 | Stop reason: HOSPADM

## 2025-02-18 RX ORDER — METHYLPREDNISOLONE 4 MG/1
16 TABLET ORAL DAILY
Status: DISCONTINUED | OUTPATIENT
Start: 2025-02-18 | End: 2025-02-18 | Stop reason: HOSPADM

## 2025-02-18 RX ORDER — ONDANSETRON HYDROCHLORIDE 2 MG/ML
8 INJECTION, SOLUTION INTRAVENOUS EVERY 6 HOURS PRN
Status: DISCONTINUED | OUTPATIENT
Start: 2025-02-18 | End: 2025-02-18 | Stop reason: HOSPADM

## 2025-02-18 RX ORDER — HEPARIN 100 UNIT/ML
5 SYRINGE INTRAVENOUS ONCE
Status: COMPLETED | OUTPATIENT
Start: 2025-02-18 | End: 2025-02-18

## 2025-02-18 RX ORDER — METHYLPREDNISOLONE 4 MG/1
8 TABLET ORAL DAILY
Status: DISCONTINUED | OUTPATIENT
Start: 2025-02-28 | End: 2025-02-18 | Stop reason: HOSPADM

## 2025-02-18 RX ORDER — ONDANSETRON 8 MG/1
8 TABLET, ORALLY DISINTEGRATING ORAL EVERY 6 HOURS PRN
Status: DISCONTINUED | OUTPATIENT
Start: 2025-02-18 | End: 2025-02-18 | Stop reason: HOSPADM

## 2025-02-18 RX ORDER — METHYLPREDNISOLONE 4 MG/1
4 TABLET ORAL DAILY
Status: DISCONTINUED | OUTPATIENT
Start: 2025-03-05 | End: 2025-02-18 | Stop reason: HOSPADM

## 2025-02-18 RX ORDER — ONDANSETRON 8 MG/1
8 TABLET, ORALLY DISINTEGRATING ORAL EVERY 8 HOURS PRN
Qty: 20 TABLET | Refills: 0 | Status: SHIPPED | OUTPATIENT
Start: 2025-02-18

## 2025-02-18 RX ORDER — METHYLPREDNISOLONE 4 MG/1
12 TABLET ORAL DAILY
Status: DISCONTINUED | OUTPATIENT
Start: 2025-02-23 | End: 2025-02-18 | Stop reason: HOSPADM

## 2025-02-18 RX ADMIN — LACOSAMIDE 300 MG: 100 TABLET, FILM COATED ORAL at 08:53

## 2025-02-18 RX ADMIN — AZELASTINE HYDROCHLORIDE 1 SPRAY: 137 SPRAY, METERED NASAL at 08:52

## 2025-02-18 RX ADMIN — EZETIMIBE 10 MG: 10 TABLET ORAL at 09:03

## 2025-02-18 RX ADMIN — SCOPOLAMINE 1 PATCH: 1.5 PATCH, EXTENDED RELEASE TRANSDERMAL at 11:51

## 2025-02-18 RX ADMIN — METHYLPREDNISOLONE 16 MG: 4 TABLET ORAL at 11:51

## 2025-02-18 RX ADMIN — LEVETIRACETAM 1500 MG: 500 TABLET, FILM COATED ORAL at 08:53

## 2025-02-18 RX ADMIN — LEVOTHYROXINE SODIUM 75 MCG: 0.07 TABLET ORAL at 07:53

## 2025-02-18 RX ADMIN — PROPRANOLOL HYDROCHLORIDE 60 MG: 60 CAPSULE, EXTENDED RELEASE ORAL at 08:54

## 2025-02-18 RX ADMIN — ROPINIROLE 0.5 MG: 0.5 TABLET, FILM COATED ORAL at 08:54

## 2025-02-18 RX ADMIN — HEPARIN 500 UNITS: 100 SYRINGE at 14:08

## 2025-02-18 RX ADMIN — INSULIN LISPRO 1 UNITS: 100 INJECTION, SOLUTION INTRAVENOUS; SUBCUTANEOUS at 08:52

## 2025-02-18 RX ADMIN — TIZANIDINE 2 MG: 4 TABLET ORAL at 08:53

## 2025-02-18 RX ADMIN — HEPARIN SODIUM 7500 UNITS: 5000 INJECTION, SOLUTION INTRAVENOUS; SUBCUTANEOUS at 06:16

## 2025-02-18 RX ADMIN — HYDROCORTISONE 10 MG: 10 TABLET ORAL at 08:53

## 2025-02-18 RX ADMIN — KETOROLAC TROMETHAMINE 30 MG: 30 INJECTION, SOLUTION INTRAMUSCULAR; INTRAVENOUS at 03:43

## 2025-02-18 RX ADMIN — FOLIC ACID 1 MG: 1 TABLET ORAL at 08:53

## 2025-02-18 RX ADMIN — ONDANSETRON 8 MG: 2 INJECTION INTRAMUSCULAR; INTRAVENOUS at 03:50

## 2025-02-18 RX ADMIN — KETOROLAC TROMETHAMINE 30 MG: 30 INJECTION, SOLUTION INTRAMUSCULAR; INTRAVENOUS at 09:55

## 2025-02-18 RX ADMIN — FUROSEMIDE 20 MG: 40 TABLET ORAL at 08:54

## 2025-02-18 RX ADMIN — HEPARIN SODIUM 7500 UNITS: 5000 INJECTION, SOLUTION INTRAVENOUS; SUBCUTANEOUS at 14:08

## 2025-02-18 RX ADMIN — ONDANSETRON 8 MG: 2 INJECTION INTRAMUSCULAR; INTRAVENOUS at 09:55

## 2025-02-18 RX ADMIN — MICONAZOLE NITRATE: 20 CREAM TOPICAL at 08:55

## 2025-02-18 RX ADMIN — FLUTICASONE PROPIONATE 1 SPRAY: 50 SPRAY, METERED NASAL at 08:53

## 2025-02-18 RX ADMIN — CLONAZEPAM 0.5 MG: 0.5 TABLET ORAL at 08:54

## 2025-02-18 RX ADMIN — DIVALPROEX SODIUM 500 MG: 500 TABLET, FILM COATED, EXTENDED RELEASE ORAL at 08:53

## 2025-02-18 RX ADMIN — POLYETHYLENE GLYCOL 3350 17 G: 17 POWDER, FOR SOLUTION ORAL at 09:04

## 2025-02-18 RX ADMIN — PANTOPRAZOLE SODIUM 40 MG: 40 TABLET, DELAYED RELEASE ORAL at 06:16

## 2025-02-18 ASSESSMENT — COGNITIVE AND FUNCTIONAL STATUS - GENERAL
DAILY ACTIVITIY SCORE: 24
MOBILITY SCORE: 24

## 2025-02-18 ASSESSMENT — PAIN - FUNCTIONAL ASSESSMENT
PAIN_FUNCTIONAL_ASSESSMENT: 0-10
PAIN_FUNCTIONAL_ASSESSMENT: 0-10

## 2025-02-18 ASSESSMENT — PAIN DESCRIPTION - LOCATION: LOCATION: HEAD

## 2025-02-18 ASSESSMENT — PAIN SCALES - GENERAL
PAINLEVEL_OUTOF10: 2
PAINLEVEL_OUTOF10: 5 - MODERATE PAIN
PAINLEVEL_OUTOF10: 6

## 2025-02-18 ASSESSMENT — PAIN DESCRIPTION - DESCRIPTORS: DESCRIPTORS: ACHING

## 2025-02-18 NOTE — PROGRESS NOTES
Transitional Care Coordination Progress Note:  Plan per Medical/Surgical team: Pt admitted with headache and visual changes, nausea, and shunt pain. CT of head (-). 2/16 s/p LP, samantha dialed down to 3. Neurology consulted for vision changed.  Discharge Disposition: Resumed private duty [facility]. Home with spouse.  Potential Barriers: none  ADOD: 2/19    Marisol Mario, RN, BSN  Transitional Care Coordinator  Office: [phone]  Secure chat via Haiku

## 2025-02-18 NOTE — CONSULTS
"History Of Present Illness  Jonathan Ayala is a 49 y.o. 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 c/b tract hemorrhage and SAH and focal epilepsy, right hemiplegic migraines, and other PMHx of CVID on monthly IVIG infusions, Sjogren's syndrome/scleroderma, POTS, T2DM, HTN, hypothyroidism, BRENT on CPAP, anxiety/depression, 1/28 p/w drainage from shunt site, cough, vision changes, 1/28 s/p LP (OP31), 1/30 s/p valve exchange (certas at 4), admitted under Neurosurgery for HA, vision changes, shunt pain, Neurology consulted \"visual hallucinations after increase in Vimpat previous admission.\"    History obtained speaking the patient.  She is a fair albeit inconsistent historian.  We first began by discussing her visual complaints.  She states that upon return home from her January admission at [facility] she knows that her lacosamide was increased.  She found that at the same time after a few days of being on the higher dose of the lacosamide she noticed that she would have augmentations of her reality.  Specifically she first noticed that when she laid out all of her home medications on the lid of a Tupperware container she imagined that the pills lined transformed before her eyes into a centipede that appeared to crawl across the Tupperware lid.  After a few moments of adjusting lights in the room and telling herself that this is not real this illusion spontaneously resolved.  She found it mildly alarming as she does not like bugs.  She then noticed throughout the week that she would also have an illusion of the center screw in the power outlets around her home appear to jiggle and wiggle.  She found that again turning up the light in the room caused this issue to resolve.  Lastly when she is in her bathroom which is  which is in the same home that she grew up in she found that the wallpaper that normally consists of flowers to again transform briefly into dragon's.  Again when she turns on the lights " in her bathroom this again resolves.  She does recall begging God to have an illusion that was more pleasant to look at then the dragon.  She vehemently denies ever seeing a caterpillar during any of these delusions.    We then discussed if she was having any other symptoms of concern.  She first brought up that she does briefly experience what she believes to be a tremor in both of her hands since increasing her medication.  She finds that this is worse both in the morning and in the evening.  She denies a fast movement causing her to jerk and drop objects but rather slight difficulty in certain positions holding objects with a slight accompanying shake.    She also endorses an additional symptom of issues with her memory.  She finds that she will often meandering during casual conversation with her  forgetting to finish 1 point but then later circling back to the same point.  She denies an exact decline in her memory but rather feels that sometimes she will miss a point only to recall what weeks later in an argument with her .    Lastly she has felt imbalanced prior to coming in for evaluation during this hospitalization.  She has been feeling a fullness in her right ear which she experienced before adjustments to her shunt.  This fullness in her ear then evolved into issues when she would go to walk across the room as feeling imbalance like she needs to reach out for various objects.  Her  has been having to assist her in terms of walking across the room and sometimes she uses her dog for help.  She denies any issues with her current headaches as sometimes she also has walking difficulty during her migraine attacks.  She denies any weakness from any of these issues.    She has voiced all of these concerns to her outpatient epilepsy doctor at [facility] and she is actually planned to come into their epilepsy monitoring unit in order to better classify these issues and possibly  "to down titrate her medication.    Current hospital course:    Pt's course includes LP 2/16, opening pressure of 26, shunt pressure settings dialed down to 3. Per NSGY note, pt was reporting visual hallucinations, increased word finding difficulty since increase of vimpat by outpatient neurology.     Of note, ophthalmology has assessed patient while inpatient 2/15 for disc edema evaluation. Per documentation, pt reporting floaters both eyes (grayish/blackish) about 10-20 in each eye. Acuity exam was R 20/30-2, L 20/50+2, no disc edema appreciated.     Per chart review:      Neurology Hx/Admissions:  Admitted under Neurology  9/17-9/25/2024 for 1 month history of R eye blurred vision, found to have unilateral optic disk edema, ddx atypical optic neuritis vs neuro-retinitis. LP then done showing with elevated protein, OP 52, no pleocytosis. Headache improved after LP, diagnosed with IIH, s/p VPS with NSGY 9/24/24.     Recent consult to Neurology 1/8/2025 for \"possible seizure.\" Pt had severe headache, holocephalic, double vision, then shaking of left arm and leg, with deviation of head to right side, 1 minute, loss of awareness, then another event, 4 minutes, eyes closed. Given presentation started with severe headache, suggestive of high ICP related to shunt failure. 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 Vimpat was increased from 250 BID to 300 BID, Keppra continued on 1500mg BID. Routine EEG w/o epileptiform activity.    O/P Neurology note (2/11/25):  -2-3 migraine days/month, usually unilateral, sometimes holocephalic. The pain feels like pressure, severity between a 4 and a 10, associated photophobia, phonophobia, and nausea- ondansetron sometimes helpful. on amitriptyline 100 daily, Qulipta, Reyvow (abortive)    Notable per further chart review, patient has reported hallucinations with higher doses of VPA in the past.     Epilepsy History:   Patient described her events " as head deviation to the left, left hemibody shaking, walking in circles with retained awareness.  She was admitted for video EEG evaluation from 3/14/2023 to 3/20/2023.  Interictal EEG showed no epileptiform discharges. One typical event (1E) was captured which consisted of asymmetric (L>R) jerking of   the shoulder/torso and body shaking consistent with a paroxysmal non epileptic event. EEG showed no changes. 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 She was provided with resources for CBT . In February 2022 she had left arm clonic and left head versive seizure captured from the right parietal lobe.     Classification of the Paroxysmal Episodes:   Epileptic  Episodes semiology: Left arm clonic -> left head version.   Frequency: unknown   Non-epileptic psychogenic:   Episodes semiology: Generalized clonic event (D)   Frequency: Once per month  History of Status Epilepticus: No   Localization: Right parietal   Etiology: Traumatic SAH   Co-morbidities: Hypothyroidism, BRENT, depression anxiety, Sjogren's syndrome, common variable immunodeficiency, hypertension, diabetes.     EEG (, 11/30/2022):  This awake and sleep EEG is normal. There was one nonepileptic event without EEG correlate. There were no epileptiform discharges or lateralizing signs seen.    VEEG (, 5/12/2022-5/15/2022):  This is a normal awake and sleep vEEG. There are no lateralizing signs, epileptiform discharges or seizures recorded.     VEEG (, 2/14/2022-2/18/2022):  This EEG is indicative of a right hemispheric structural lesion. On 2/15/2022, the EEG was indicative of a highly active right parietal epileptogenic structural lesion. Multiple seizures were captured from the right parietal lobe with left arm clonic and left head version. No electroclinical seizures were noted the rest of the recording.     MRI brain wo/w contrast (, 11/11/2022):  1. Changes of prior right frontal approach bolt placement are again demonstrated, with  small amount of hemosiderin staining present within the sulci of the right frontal lobe, and small amount of FLAIR hyperintense gliosis present in the adjacent white matter  2. Otherwise, no acute intracranial abnormality is evident. No evidence of new infarct or acute hemorrhage. No area of cystic encephalomalacia suggestive of previous infarcts are identifiedmpression:     Current Admission Imaging:  [facility] 2/15/25:  IMPRESSION:  1. No acute intracranial abnormality or calvarial fracture.  2. No significant interval change in appearance of the head compared  to prior examination with unchanged position of right frontal  approach ventriculostomy catheter.    XR Shunt Series 2/15/25:  IMPRESSION:  1.  Right parietal ventriculostomy shunt catheter without kinking or  discontinuity, as described above.  2. No acute cardiopulmonary process or bowel obstruction.    Component  Ref Range & Units 1 mo ago  (1/8/25) 2 yr ago  (2/14/23) 2 yr ago  (6/13/22) 2 yr ago  (5/11/22) 2 yr ago  (4/13/22) 2 yr ago  (4/1/22)   Lacosamide  1.0 - 10.0 ug/mL 12.7 High  16.3 High  R, CM 14.3 High  R, CM 8.8 R, CM 8.0 R, CM 7.5 R, CM     Component  Ref Range & Units 3 d ago 1 mo ago   Keppra  10 - 40 ug/mL 24 42 High        Past Medical History  Past Medical History:   Diagnosis Date    Abnormal findings on diagnostic imaging of other abdominal regions, including retroperitoneum 10/14/2020    Abnormal CT of the abdomen    Acquired deformity of nose 03/24/2022    Nasal deformity    Acute upper respiratory infection, unspecified 10/16/2019    Acute URI    Adrenal disease (Multi)     Allergic     Allergy status to unspecified drugs, medicaments and biological substances 05/22/2020    History of drug allergy    Allergy status to unspecified drugs, medicaments and biological substances 11/13/2020    History of adverse drug reaction    Anemia     Anxiety 2005    Asthma     Benign intracranial hypertension 01/27/2022    Pseudotumor cerebri    Bipolar  disorder, unspecified (Multi)     Bipolar disease, chronic    Breast calcification, right 08/21/2018    Cellulitis of abdominal wall 09/28/2022    Cellulitis of right abdominal wall    Cervicalgia 07/01/2020    Cervicalgia of mkhbhvzt-cmztpsa-sqgzn region    Chronic maxillary sinusitis 01/04/2022    Chronic maxillary sinusitis    Chronic sialoadenitis 03/16/2020    Chronic sialoadenitis    COVID-19 01/06/2022    COVID-19 with multiple comorbidities    Decreased white blood cell count, unspecified 11/04/2019    Leukopenia    Disease of thyroid gland     Disturbances of salivary secretion 03/16/2020    Xerostomia    Dry eye syndrome of bilateral lacrimal glands 10/07/2022    Dry eyes, bilateral    Encounter for preprocedural cardiovascular examination 02/01/2022    Preoperative cardiovascular examination    Food additives allergy status 06/11/2020    Allergy to food dye    Fracture of nasal bones, initial encounter for closed fracture 03/03/2022    Closed fracture of nasal bone, initial encounter    GERD (gastroesophageal reflux disease) 15 years old    Granuloma of right orbit 10/07/2021    Inflammatory pseudotumor of right orbit    History of endometrial ablation 11/09/2017    Hyperlipidemia     Hypertension     Hyperthyroidism     Hypoglycemia     Hypothyroidism     Immunocompromised     Localized swelling, mass and lump, head 03/24/2022    Swollen nose    Major depressive disorder, recurrent, in full remission (CMS-HCC) 10/07/2021    Depression, major, recurrent, in complete remission    Mammary duct ectasia of left breast 08/24/2022    Periductal mastitis of left breast    Meningitis ([facility]) 2008    Migraine     Nipple discharge 08/24/2022    Bloody discharge from left nipple    Ocular pain, right eye 10/07/2022    Pain in right eye    Optic atrophy     Other abnormal and inconclusive findings on diagnostic imaging of breast 07/06/2020    Other abnormal and inconclusive findings on diagnostic imaging of breast     Other anomalies of pupillary function 05/31/2019    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    Other chest pain 05/18/2020    Chest discomfort    Other conditions influencing health status 08/01/2022    History of cough    Other conditions influencing health status 08/03/2021    Chronic migraine    Other specified disorders of eustachian tube, left ear 11/18/2019    ETD (Eustachian tube dysfunction), left    Other specified disorders of nose and nasal sinuses 03/24/2022    Nasal dryness    Other specified disorders of nose and nasal sinuses 03/24/2022    Nasal crusting    Pelvic and perineal pain 07/06/2020    Pelvic pain    Personal history of other diseases of the circulatory system 04/07/2020    History of sinus tachycardia    Personal history of other diseases of the circulatory system 04/07/2020    History of abnormal electrocardiography    Personal history of other diseases of the circulatory system     History of Raynaud's syndrome    Personal history of other diseases of the digestive system     History of irritable bowel syndrome    Personal history of other diseases of the digestive system 03/02/2020    History of oral pain    Personal history of other diseases of the musculoskeletal system and connective tissue 01/19/2022    History of neck pain    Personal history of other diseases of the musculoskeletal system and connective tissue 03/02/2021    History of scleroderma    Personal history of other diseases of the musculoskeletal system and connective tissue 06/16/2020    History of muscle weakness    Personal history of other diseases of the nervous system and sense organs 11/18/2019    History of hearing loss    Personal history of other diseases of the nervous system and sense organs 09/21/2022    History of partial seizures    Personal history of other diseases of the respiratory system 04/14/2021    History of asthma    Personal history of other endocrine, nutritional and metabolic disease  02/17/2021    History of diabetes mellitus    Personal history of other mental and behavioral disorders 05/27/2021    History of anxiety    Personal history of other specified conditions 09/07/2022    History of nipple discharge    Personal history of other specified conditions 10/16/2019    History of headache    Personal history of other specified conditions 09/28/2022    History of lump of left breast    Personal history of other specified conditions 09/16/2021    History of persistent cough    Personal history of other specified conditions 02/01/2022    History of palpitations    Personal history of other specified conditions 03/09/2022    History of headache    Personal history of other specified conditions 02/12/2014    History of chest pain    Personal history of other specified conditions 10/27/2021    History of nausea and vomiting    Personal history of other specified conditions 10/16/2019    History of fatigue    Personal history of other specified conditions 02/26/2021    History of orthopnea    Personal history of other specified conditions 02/22/2021    History of shortness of breath    Personal history of urinary calculi     H/O renal calculi    Polycystic ovary syndrome     Postural orthostatic tachycardia syndrome (POTS)     POTS (postural orthostatic tachycardia syndrome)    Rash and other nonspecific skin eruption 03/15/2022    Rash    Repeated falls 06/23/2021    Recurrent falls    Right lower quadrant pain 10/14/2020    Abdominal pain, RLQ (right lower quadrant)    Seizures (Multi)     Sjogren syndrome, unspecified (Multi)     History of Sjogren's disease    Sjogren's syndrome     Sleep apnea     Slow transit constipation 07/09/2020    Slow transit constipation    Subarachnoid hemorrhage, traumatic (Multi) 04/19/2023    Thyroid nodule     Traumatic subarachnoid hemorrhage with loss of consciousness of unspecified duration, subsequent encounter 03/15/2022    Subarachnoid hemorrhage following  injury, with loss of consciousness, subsequent encounter    Traumatic subarachnoid hemorrhage without loss of consciousness, subsequent encounter     Subarachnoid hemorrhage following injury, no loss of consciousness, subsequent encounter    Type 2 diabetes mellitus     Unspecified disorder of refraction 10/07/2022    Refractive error    Unspecified optic neuritis 11/06/2020    Right optic neuritis    Unspecified optic neuritis 11/06/2020    Optic neuritis, right    Unspecified visual loss 09/25/2019    Vision loss    Varicella As a child    Venous insufficiency (chronic) (peripheral) 10/18/2021    Chronic venous insufficiency of lower extremity    Viral infection, unspecified 01/11/2022    Nonspecific syndrome suggestive of viral illness    Vitamin D deficiency     Vitamin D deficiency, unspecified 09/28/2022    Vitamin D deficiency     Surgical History  Past Surgical History:   Procedure Laterality Date    APPENDECTOMY  2017    BRAIN SURGERY  [place] placement to measure pressure    CARDIAC CATHETERIZATION      CHOLECYSTECTOMY      ENDOMETRIAL ABLATION      FRACTURE SURGERY  12/2023    HERNIA REPAIR Right 03/01/2024    with mesh    HYSTERECTOMY  2017    MR HEAD ANGIO WO IV CONTRAST  02/08/2021    MR HEAD ANGIO WO IV CONTRAST 2/8/2021 [facility]ICAL [facility]    MR NECK ANGIO WO IV CONTRAST  02/08/2021    MR NECK ANGIO WO IV CONTRAST 2/8/2021 [facility] CLINICAL LEGAC[facility]    OTHER SURGICAL HISTORY  08/22/2019    Carpal tunnel surgery    OTHER SURGICAL HISTORY  08/22/2019    Hysterectomy    OTHER SURGICAL HISTORY  08/22/2019    Venous access port placement    OTHER SURGICAL HISTORY  08/22/2019    Cholecystectomy    OTHER SURGICAL HISTORY  08/22/2019    Appendectomy    OTHER SURGICAL HISTORY  08/22/2019    Pyloroplasty    WISDOM TOOTH EXTRACTION  2004     Social History  Social History     Tobacco Use    Smoking status: Never    Smokeless tobacco: Never   Vaping Use    Vaping status: Never Used   Substance Use Topics    Alcohol use:  "Not Currently     Comment: Socially in College    Drug use: Yes     Types: Benzodiazepines     Allergies  Acetazolamide, Atorvastatin, Cefdinir, Ceftriaxone, Doxepin, Duloxetine, Fd and c red no.40, Levofloxacin, Levofloxacin in d5w, Nutritional supplements, Ozempic [semaglutide], Prochlorperazine, Red dye, Rosemary, Rosemary oil, Strawberry, Sulfa (sulfonamide antibiotics), Topiramate, Tree pollen-black walnut, Tree pollen-pecan, Manson, Aripiprazole, Aspartame, Aspartame (bulk), Fenofibrate, Gluten, Hydrochlorothiazide, Hydromorphone, Iron, Meclizine, Metformin, Propoxyphene, Statins-hmg-coa reductase inhibitors, Tetracyclines, Thiazides, Venlafaxine, Wheat, Adhesive tape-silicones, Barbiturates, Ciprofloxacin, Dhe, Diet foods, Farxiga [dapagliflozin], Ferrous sulfate, Keflex [cephalexin], L.acidoph-l.bulg-b.bif-s.therm, Milk containing products (dairy), Nsaids (non-steroidal anti-inflammatory drug), Other, Sulfonylureas, Tobramycin, Vancomycin, Adhesive, Azithromycin, Betamethasone, House dust, Metoclopramide hcl, Prednisone, Propoxyphene-acetaminophen, Sulfacetamide sodium, Yxvgzjon-4-nh9 antimigraine agents, and Zolpidem    Review of Systems  Neurological Exam  Physical Exam    Last Recorded Vitals  Blood pressure 127/73, pulse 52, temperature 35.4 °C (95.7 °F), resp. rate 18, height 1.575 m (5' 2\"), weight 109 kg (240 lb), SpO2 94%.    Physical Exam:  General: Obese  woman dressed in hospital gown; no acute distress      Neurological Exam:  MENTAL STATUS:  Orientation: Recognizes writer as neurologist and gives good medical history.  Knows location and date as well as medication regimen.  Language: Expression, repetition, naming, comprehension intact  Thought processes: Logical, organized    CRANIAL NERVES:  - Fundoscopic exam: Deferred as patient actively follows with ophthalmology in [place]  - II/III: PERRL  - II: Loss of superior visual fields bilaterally  - III, IV, VI: Ratchey saccades with choppy " pursuits.  Range of movements intact.  - V: V1-V3 subjective decrement of sensation in left face  - VII: Face muscles symmetric with smile and eye closure  - VIII: Intact to interview  - IX, X: Palate elevated symmetrically bilaterally, no hoarseness  - XI: 5/5 strength on shoulder shrugging bilaterally  - XII: Tongue midline without atrophy or fasciculation    MOTOR: No tremor, no abnormal movements.    STRENGTH: R L  Deltoid  5 5  Biceps  5 5  Triceps  5 5    5 5    Hip flexion 5 5  Quadriceps 5 5  Hamstrings 5 5  DorsiFlex 5 5  PlantarFlex 5 5    REFLEXES: R L  Biceps  +2 +2  Triceps  1 1  Brachioradialis +2 +2  Patellar  +2 +2  Achilles 1+ 1+    No Gonzalez, crossed adductors, Babinski, or clonus    COORDINATION: Intact on finger to nose bl, intact on heel to shin bl  SENSORY: Objective decreased to light touch in left hemibody versus right  ROMBERG: Positive consistent sway to the left  GAIT: Normal standard gait; tandem intact      Relevant Results      Current Facility-Administered Medications:     amitriptyline (Elavil) tablet 100 mg, 100 mg, oral, Nightly, Rom Hicks MD, 100 mg at 02/17/25 2037    azelastine (Astelin) 137 mcg (0.1 %) nasal spray 1 spray, 1 spray, Each Nostril, BID, Rom Hicks MD, 1 spray at 02/16/25 2243    clonazePAM (KlonoPIN) tablet 0.5 mg, 0.5 mg, oral, BID, Rom Hicks MD, 0.5 mg at 02/17/25 2037    dextrose 50 % injection 12.5 g, 12.5 g, intravenous, q15 min PRN, Rom Hicks MD    dextrose 50 % injection 25 g, 25 g, intravenous, q15 min PRN, Rom Hicks MD    divalproex (Depakote ER) 24 hr tablet 500 mg, 500 mg, oral, BID, Rom Hicks MD, 500 mg at 02/17/25 2037    ezetimibe (Zetia) tablet 10 mg, 10 mg, oral, Daily, Rom Hicks MD, 10 mg at 02/17/25 0929    fluticasone (Flonase) nasal spray 1 spray, 1 spray, Each Nostril, Daily, Maria Del Rosario Staples, APRN-CNP, 1 spray at 02/17/25 1149    fluticasone furoate-vilanteroL (Breo Ellipta) 200-25 mcg/dose inhaler 1 puff, 1  puff, inhalation, Daily, Rom Hicks MD    folic acid (Folvite) tablet 1 mg, 1 mg, oral, Daily, Rom Hicks MD, 1 mg at 02/17/25 0929    furosemide (Lasix) tablet 20 mg, 20 mg, oral, BID, Rom Hicks MD, 20 mg at 02/17/25 2037    glucagon (Glucagen) injection 1 mg, 1 mg, intramuscular, q15 min PRN, Rom Hicks MD    glucagon (Glucagen) injection 1 mg, 1 mg, intramuscular, q15 min PRN, Rom Hicks MD    heparin (porcine) injection 7,500 Units, 7,500 Units, subcutaneous, q8h OLESYA, Avelino Tafoya MD, 7,500 Units at 02/18/25 0616    hydrocortisone (Cortef) tablet 10 mg, 10 mg, oral, Daily, Maria Luisa Arora MD    immune globulin (human) (Gammagard) infusion 60 g, 60 g, intravenous, q14 days, Rom Hicks MD    insulin lispro injection 0-5 Units, 0-5 Units, subcutaneous, TID AC, Rom Hicks MD, 2 Units at 02/17/25 1614    ipratropium-albuteroL (Duo-Neb) 0.5-2.5 mg/3 mL nebulizer solution 3 mL, 3 mL, nebulization, 4x daily PRN, Rom Hicks MD    ketorolac (Toradol) injection 30 mg, 30 mg, intravenous, q6h PRN, Rom Hicks MD, 30 mg at 02/18/25 0343    lacosamide (Vimpat) tablet 300 mg, 300 mg, oral, BID, Rom Hicks MD, 300 mg at 02/17/25 2037    levETIRAcetam (Keppra) tablet 1,500 mg, 1,500 mg, oral, BID, Rom Hicks MD, 1,500 mg at 02/17/25 2037    levothyroxine (Synthroid, Levoxyl) tablet 75 mcg, 75 mcg, oral, Daily before breakfast, Rom Hicks MD, 75 mcg at 02/17/25 0929    miconazole (Micotin) 2 % cream 1 Application, 1 Application, Topical, Daily PRN, Rom Hicks MD    miconazole (Micotin) 2 % cream, , Topical, BID, Rom Hicks MD, Given at 02/16/25 2310    moisturizing mouth (Biotene Dry Mouth) solution 5 mL, 5 mL, Swish & Spit, 4x daily PRN, Rom Hicks MD    montelukast (Singulair) tablet 10 mg, 10 mg, oral, Nightly, Rom Hicks MD, 10 mg at 02/17/25 2038    ondansetron ODT (Zofran-ODT) disintegrating tablet 8 mg, 8 mg, oral, q8h PRN **OR** ondansetron (Zofran) injection 8 mg, 8 mg,  "intravenous, q8h PRN, HEMAL Francois, 8 mg at 02/18/25 0350    oxygen (O2) therapy, , inhalation, Continuous PRN - O2/gases, Avelino Tafoya MD    pantoprazole (ProtoNix) EC tablet 40 mg, 40 mg, oral, BID AC, Rom Hicks MD, 40 mg at 02/18/25 0616    polyethylene glycol (Glycolax, Miralax) packet 17 g, 17 g, oral, Daily, Avelino Tafoya MD, 17 g at 02/17/25 0928    propranolol LA (Inderal LA) 24 hr capsule 60 mg, 60 mg, oral, Daily, Rom Hicks MD, 60 mg at 02/17/25 0943    rOPINIRole (Requip) tablet 0.5 mg, 0.5 mg, oral, q AM, Rom Hicks MD, 0.5 mg at 02/17/25 0930    rOPINIRole (Requip) tablet 1 mg, 1 mg, oral, q PM, Rom Hicks MD, 1 mg at 02/17/25 2203    sennosides (Senokot) tablet 8.6 mg, 1 tablet, oral, Nightly, HEMAL Francois, 8.6 mg at 02/17/25 2038    tiZANidine (Zanaflex) tablet 2 mg, 2 mg, oral, BID, Rom Hicks MD, 2 mg at 02/17/25 2037  Assessment & Plan      Jonathan Ayala is a 49 y.o. 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 c/b tract hemorrhage and SAH and focal epilepsy, right hemiplegic migraines, and other PMHx of CVID on monthly IVIG infusions, Sjogren's syndrome/scleroderma, POTS, T2DM, HTN, hypothyroidism, BRENT on CPAP, anxiety/depression, 1/28 p/w drainage from shunt site, cough, vision changes, 1/28 s/p LP (OP31), 1/30 s/p valve exchange (certas at 4), admitted under Neurosurgery for HA, vision changes, shunt pain, Neurology consulted \"visual hallucinations after increase in Vimpat previous admission.\"    First and foremost the patient does not suffer from hallucinations but rather illusions these episodes appear to occur as augmentations of existent reality.  Additionally she has good insight into these and they seem to resolve whenever a room is brightly lit.  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  What is atypical is that the patient likely has had longstanding " vision loss and that this appears to have occurred in isolation in her home in the setting of an increase of lacosamide.  This does not fit anything related to her seizure semiology or any release phenomenon that could be happening from subclinical seizures and subsequent Rick's paralysis.  That being said this can be further explored as the patient is set for a epilepsy monitoring unit admission for medication titration at [facility].    Recommendations:    - Continue all home medications    - Continue to follow with ophthalmology and neurosurgery on outpatient basis    -Patient will unfortunately not be following with neurology at [facility]; encourage patient to keep [facility] follow-up for her epilepsy and possible medication titration in  EMU    Hoang Valentino DO  PGY-2 Neurology    Zachary Aguillon MD  PGY-4 Neurology  Epilepsy [id number]  [facility] [id number]      ===================================  Post rounds updates:   Patient was seen at bedside with Epilepsy team.     Regarding her visual symptoms, this is consistent with visual hallucinations, which from her description could suggest visual release phenomenon related to her history of visual loss, or possibly parietal seizures. It would be atypical for lacosamide to be associated with this side effect. Keppra is not expected to have visual hallucinations without mood symptoms. Other considerations include some other medications side effects (e.g. ropinirole) and she can discuss this with her perscriber.     These symptoms are not severe or frequent to warrant adjusting AEDs at this time(wither increasing or decreasing) and we would not recommend changing her AEDs at this time. We discussed that this should be addressed at her next planned EMU admission at [facility] which she is planning to schedule at [facility].     No further neurologic work-up or interventions. Neurology will sign off    Bryan Durbin  PGY3 Neurology  Epilepsy Pager: [phone]      The patient was seen and discussed  with Epilepsy attending Dr. Irvin.

## 2025-02-18 NOTE — PROGRESS NOTES
"Jonathan Ayala is a 49 y.o. female on day 2 of admission presenting with Chronic nonintractable headache, unspecified headache type.    Subjective   Reports improved headache. Feels a little bit off balance and has R eye pressure. Reports visual hallucinations, incr word finding difficulty since increase of vimpat by neurologist    Objective     Physical Exam  Awake, Ox3  R cranial incision c/d/I, healing well, sutures in place  BUE 5/5  BLE 5/5  SILT     Last Recorded Vitals  Blood pressure 127/73, pulse 52, temperature 35.4 °C (95.7 °F), resp. rate 18, height 1.575 m (5' 2\"), weight 109 kg (240 lb), SpO2 94%.  Intake/Output last 3 Shifts:  No intake/output data recorded.    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 10/30/2024 s/p R VPS exploration w abdominal catheter repositioning w improvement in distal runnoff, 11/15/2024 s/p RF shunt wound revision and fractured valve replacement, 12/10 s/p R cranial wound exploration, washout, revision, 1/28 p/w drainage from shunt site, cough, vision changes, 1/28 s/p LP (OP31), 1/30 s/p valve exchange (certas at 4), 2/15 p/w HA, vision changes, shunt pain, nausea, sweats/chills, tremors, unsteadiness for 2d, CTH stable, SS intact     2/16 s/p LP (OP 26), certas dialed down to 3  2/18 MRI brain blitz protocol patent shunt     Plan:  Floor  Follow up final read of MRI brain  Appreciate ophtho recs - no papilledema   Follow up " blood and CSF cultures (no growth to date)  Continue moxifloxacin for PNA  Con't home AEDs  Will arrange neurology follow up to discuss AEDs given side effects after recent increase  Will arrange outpatient follow up  Dispo pending MRI            Eder Melissa MD

## 2025-02-18 NOTE — DISCHARGE SUMMARY
Discharge Diagnosis  Chronic nonintractable headache, unspecified headache type    Issues Requiring Follow-Up  NA    Test Results Pending At Discharge  Pending Labs       Order Current Status    Lacosamide In process    Blood Culture Preliminary result    CSF Culture/Smear Preliminary result            Hospital Course  Jonathan Ayala is a 49 y.o. 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 10/30/2024 s/p R VPS exploration w abdominal catheter repositioning w improvement in distal runnoff, 11/15/2024 s/p RF shunt wound revision and fractured valve replacement, 12/10 s/p R cranial wound exploration, washout, revision, 1/28 p/w drainage from shunt site, cough, vision changes, 1/28 s/p LP (OP31), 1/30 s/p valve exchange (certas at 4), 2/15 p/w HA, vision changes, shunt pain, nausea, sweats/chills, tremors, unsteadiness for 2d, CTH stable, SS intact      2/16 s/p LP (OP 26), certas dialed down to 3  2/17 MRI blitz protocol patent  2/18 neurology consulted for visual hallucinations, will need to follow up outpatient    On the day of discharge, the patient was seen and evaluated by the neurosurgery team and deemed suitable for discharge. The patient was given detailed discharge instructions and were scheduled to follow up as an outpatient.      Pertinent Physical Exam At Time of Discharge  Physical Exam  HENT:      Head: Normocephalic.   Eyes:      Pupils: Pupils are equal, round, and reactive to light.   Cardiovascular:      Pulses: Normal pulses.   Pulmonary:      Effort: Pulmonary effort is normal.   Abdominal:      Palpations: Abdomen is soft.   Skin:     General: Skin is warm.      Comments: Incision C/D/I    Neurological:      Mental Status: She is alert and oriented to person, place, and time.      Comments: 5/5x4         Home Medications     Medication List      CHANGE how you take these medications     ondansetron ODT 8 mg disintegrating tablet; Commonly known as:   Zofran-ODT; Dissolve 1 tablet (8 mg) in the mouth every 8 hours if needed   for nausea or vomiting.; What changed: medication strength, how much to   take     CONTINUE taking these medications     acetaminophen 325 mg tablet; Commonly known as: Tylenol   Advair -21 mcg/actuation inhaler; Generic drug: fluticasone   propion-salmeteroL   amitriptyline 100 mg tablet; Commonly known as: Elavil; Take 1 tablet   (100 mg) by mouth once daily at bedtime.   azelastine 137 mcg (0.1 %) nasal spray; Commonly known as: Astelin   * Baqsimi 3 mg/actuation spray,non-aerosol; Generic drug: glucagon; USE   ONE SPRAY IN THE NOSE AS NEEDED FOR LOW BLOOD SUGAR  MAY REPEAT AFTER 15   MINUTES USING A NEW DEVICE IF NO RESPONSE   * glucagon 1 mg injection; Commonly known as: Glucagen; Inject 1 mg   under the skin 1 time if needed for low blood sugar - see comments   (hypoglycemia).   carboxymethylcellulose 1 % ophthalmic solution dropperette; Commonly   known as: Refresh Celluvisc   cholecalciferol 5,000 Units tablet; Commonly known as: Vitamin D-3   Dexcom G7  misc; Generic drug: blood-glucose meter,continuous   DEXCOM G7 SENSOR MISC   divalproex 500 mg 24 hr tablet; Commonly known as: Depakote ER   EPINEPHrine 0.3 mg/0.3 mL injection syringe; Commonly known as: Epipen;   INJECT INTRAMUSCULARLY ONCE AS NEEDED FOR ANAPHYLAXIS   ezetimibe 10 mg tablet; Commonly known as: Zetia; Take 1 tablet (10 mg)   by mouth once daily.   fluconazole 150 mg tablet; Commonly known as: Diflucan; 1 tablet by   mouth daily spread 72 hours apart   fluticasone 50 mcg/actuation nasal spray; Commonly known as: Flonase;   USE 1 SPRAY INTO EACH NOSTRIL ONCE DAILY.   folic acid 1 mg  tablet; Commonly known as: Folvite; Take 1 tablet (1 mg)   by mouth once daily.   furosemide 20 mg tablet; Commonly known as: Lasix; Take 1 tablet (20 mg)   by mouth 2 times a day.   gloves, latex with aloe vera misc; Large size gloves   hydrocortisone 10 mg tablet; Commonly known as: Cortef; Take 1 tablet   (10 mg) by mouth 2 times a day. Double the dose if sick for three days   immune globulin (human) infusion; Commonly known as: Gammagard   insulin glargine 300 unit/mL (3 mL) injection; Commonly known as: Toujeo   Max Solostar- 2 unit dial; Inject 54 units under the skin every morning   insulin lispro 100 unit/mL injection; Commonly known as: HumaLOG KwikPen   Insulin; Use as directed to give up to 100 units a day   ipratropium-albuteroL 0.5-2.5 mg/3 mL nebulizer solution; Commonly known   as: Duo-Neb   KlonoPIN 0.5 mg tablet; Generic drug: clonazePAM   lacosamide 150 mg tablet tablet; Commonly known as: Vimpat; Take 2   tablets (300 mg) by mouth 2 times a day.   levETIRAcetam 750 mg tablet; Commonly known as: Keppra; Take 2 tablets   (1,500 mg) by mouth 2 times a day.   levothyroxine 50 mcg tablet; Commonly known as: Synthroid, Levoxyl; Take   1.5 tablets (75 mcg) by mouth once daily in the morning. Take before   meals.   methylPREDNISolone 4 mg tablet; Commonly known as: Medrol   * miconazole 2 % powder; Commonly known as: Micotin   * miconazole 2 % cream; Commonly known as: Micotin   moisturizing mouth solution; Commonly known as: Biotene Oral Dry Mouth   montelukast 10 mg tablet; Commonly known as: Singulair; TAKE ONE TABLET   BY MOUTH EVERY DAY AT BEDTIME   Motegrity 2 mg tablet; Generic drug: prucalopride   moxifloxacin 400 mg tablet; Commonly known as: Avelox   nasal spray Nayzilam 5 mg/spray (0.1 mL) spray,non-aerosol; Generic   drug: midazolam   OneTouch Delica Plus Lancet 33 gauge misc; Generic drug: lancets   OneTouch Verio test strips strip; Generic drug: blood sugar diagnostic   pen needle, diabetic  "32 gauge x 5/32\" needle; Commonly known as: BD Lela   2nd Gen Pen Needle; Use as directed with insulin pen up to 5 times daily   polyethylene glycol 17 gram/dose powder; Commonly known as: Glycolax,   Miralax   propranolol LA 60 mg 24 hr capsule; Commonly known as: Inderal LA; Take   1 capsule (60 mg) by mouth once daily. Do not crush, chew, or split.   Protonix 40 mg EC tablet; Generic drug: pantoprazole   Reyvow 100 mg tablet; Generic drug: lasmiditan; Take 1 tablet (100 mg)   by mouth if needed for migraine. Do not drive in 8 hours of taking this   medicine. Maximum 1 dose per 24 hours.   rOPINIRole 0.5 mg tablet; Commonly known as: Requip   senna 8.6 mg tablet; Generic drug: sennosides   tiZANidine 2 mg tablet; Commonly known as: Zanaflex   ubrogepant 100 mg tablet tablet; Commonly known as: Ubrelvy; Take 1   tablet (100 mg) by mouth if needed (migraine). May repeat in 2 hours for   max of 200mg per 24 hours.   Viactiv 650 mg-12.5 mcg-40 mcg chewable tablet; Generic drug:   calcium-vitamin D3-vitamin K   ZINC ORAL  * This list has 4 medication(s) that are the same as other medications   prescribed for you. Read the directions carefully, and ask your doctor or   other care provider to review them with you.       Outpatient Follow-Up  Future Appointments   Date Time Provider Department Center   2/26/2025 12:30 PM Avelino Tafoya MD [id number] [facility]   4/3/2025 12:00 PM Marah Felix MD [id number] [facility]   4/4/2025  3:15 PM Rob Mederos MD PhD [id number] [facility]   5/20/2025  4:00 PM Isidoro Mensah DO [id number] [facility]   9/15/2025  8:00 AM Bernabe Romero, PhD [id number] Academic       Maria Del Rosario Staples, APRN-CNP  Total face to face time spent with patient/family of 30 minutes, with >50% of the time spent discussing plan of care/management, counseling/educating on disease processes, explaining results of diagnostic testing.    "

## 2025-02-18 NOTE — CARE PLAN
The patient's goals for the shift include  rest    The clinical goals for the shift include Patient will remain safe and free from injury overnight.    Over the shift, the patient was safe and free from fall or injury. Patient able to get rest overnight.  Problem: Pain - Adult  Goal: Verbalizes/displays adequate comfort level or baseline comfort level  Outcome: Progressing     Problem: Safety - Adult  Goal: Free from fall injury  Outcome: Progressing     Problem: Discharge Planning  Goal: Discharge to home or other facility with appropriate resources  Outcome: Progressing     Problem: Chronic Conditions and Co-morbidities  Goal: Patient's chronic conditions and co-morbidity symptoms are monitored and maintained or improved  Outcome: Progressing     Problem: Nutrition  Goal: Nutrient intake appropriate for maintaining nutritional needs  Outcome: Progressing     Problem: Fall/Injury  Goal: Not fall by end of shift  Outcome: Met  Goal: Be free from injury by end of the shift  Outcome: Met

## 2025-02-19 ENCOUNTER — PATIENT OUTREACH (OUTPATIENT)
Dept: PRIMARY CARE | Facility: CLINIC | Age: 50
End: 2025-02-19
Payer: COMMERCIAL

## 2025-02-19 DIAGNOSIS — Z98.2 VP (VENTRICULOPERITONEAL) SHUNT STATUS: ICD-10-CM

## 2025-02-19 DIAGNOSIS — E11.43 TYPE 2 DIABETES MELLITUS WITH DIABETIC AUTONOMIC NEUROPATHY, WITH LONG-TERM CURRENT USE OF INSULIN: ICD-10-CM

## 2025-02-19 DIAGNOSIS — Z79.4 TYPE 2 DIABETES MELLITUS WITH DIABETIC AUTONOMIC NEUROPATHY, WITH LONG-TERM CURRENT USE OF INSULIN: ICD-10-CM

## 2025-02-19 NOTE — PROGRESS NOTES
Talked with pt  Reviewed have script for home bathroom modification and wheel chair   She asked to send to her direction home      [email or web address]  Will follow up with her as Germaine is to meet with pt tomorrow. Pt will share that this was sent today so she can work on it    We reviewed pts instability with ambulating and weakness. Discussed falls precautions  Pt  stated she will send me an email with the other supplies/dme she needs.   She also mentioned that her [facility]  stated she does qualify for having her white cane sized and the associated education    The pt will get me her information      Her name is Christiana Isidorotruman THAKKAR her dexcom has been off since the MRI so she will replace today and monitor her sugars. Denied any hyper or hypo glycemia. Reviewed these.

## 2025-02-19 NOTE — PROGRESS NOTES
Pt is a readmission from dc 1/31     Discharge facility:[facility]  Discharge diagnosis:  chronic nonintractable headache  Admission date:2/16/25  Discharge date:  2/18/25    Discharge Summary by Maria Del Rosario Staples APRN-CNP (02/18/2025 12:58)     PCP Appointment Date:  just saw provider 2/3     Next appt may  Specialist Appointment Date:   2/26 dr Tafoya  Hospital Encounter and Summary: Linked also  See Discharge assessment below for further details     Medications  Medications reviewed with patient/caregiver?: Yes (2/19/2025  4:19 PM)  Is the patient having any side effects they believe may be caused by any medication additions or changes?: No (denied any) (2/19/2025  4:19 PM)  Does the patient have all medications ordered at discharge?: Yes (2/19/2025  4:19 PM)  Care Management Interventions: No intervention needed (2/19/2025  4:19 PM)  Prescription Comments: reviewed the change to zofran (2/19/2025  4:19 PM)  Is the patient taking all medications as directed (includes completed medication regime)?: Yes (2/19/2025  4:19 PM)  Medication Comments: pt had gotten a call from [facility] pharmacy today and she tried to return call and the number did not work. attempted to call for her  and number was not working. encouraged her to call tomorrow and i will also try (2/19/2025  4:19 PM)    Appointments  Does the patient have a primary care provider?: Yes (2/19/2025  4:19 PM)  Care Management Interventions: Verified appointment date/time/provider (2/19/2025  4:19 PM)  Has the patient kept scheduled appointments due by today?: Yes (had mamogram today and also IGG provider) (2/19/2025  4:19 PM)  Care Management Interventions: -- (pcp just saw pt and pt does not think she needs to come here again, sent message to make sure, sees dr Tafoya next week) (2/19/2025  4:19 PM)    Self Management  What is the home health agency?: na, has a home health aid (2/19/2025  4:19 PM)  Has home health visited the patient within 72  hours of discharge?: Not applicable (2/19/2025  4:19 PM)  What Durable Medical Equipment (DME) was ordered?: na (2/19/2025  4:19 PM)    Patient Teaching  Does the patient have access to their discharge instructions?: Yes (2/19/2025  4:19 PM)  Care Management Interventions: Reviewed instructions with patient (2/19/2025  4:19 PM)  What is the patient's perception of their health status since discharge?: Improving (less headaches, await glasses to darken room that fit properly, she does have some she is wearing to prevent the headaches, she avoids artificial and sun light.) (2/19/2025  4:19 PM)  Is the patient/caregiver able to teach back the hierarchy of who to call/visit for symptoms/problems? PCP, Specialist, Home Health nurse, Urgent Care, ED, 911: Yes (2/19/2025  4:19 PM)  Patient/Caregiver Education Comments: pt stated she did not have any new surgery. they adjusted the shunt with a magnet. she stated her headaches are better. she is avoiding sun and artificial light. she stated if her headaches get worse or her hand tremors get worse she is to go back to the ER. she has neuro follow up next week. has not had dexcom but will place today and monitor sugars as she has been on steroids.  she stated her halluciniations better since vimpat adjusted last admit. (2/19/2025  4:19 PM)

## 2025-02-20 ENCOUNTER — OFFICE VISIT (OUTPATIENT)
Dept: OPHTHALMOLOGY | Facility: CLINIC | Age: 50
End: 2025-02-20
Payer: COMMERCIAL

## 2025-02-20 ENCOUNTER — TELEPHONE (OUTPATIENT)
Dept: NEUROLOGY | Facility: CLINIC | Age: 50
End: 2025-02-20
Payer: COMMERCIAL

## 2025-02-20 DIAGNOSIS — H47.013 NAION (NON-ARTERITIC ANTERIOR ISCHEMIC OPTIC NEUROPATHY), BILATERAL: ICD-10-CM

## 2025-02-20 DIAGNOSIS — G93.2 IIH (IDIOPATHIC INTRACRANIAL HYPERTENSION): Primary | ICD-10-CM

## 2025-02-20 DIAGNOSIS — H47.20 OPTIC ATROPHY: ICD-10-CM

## 2025-02-20 PROCEDURE — 99214 OFFICE O/P EST MOD 30 MIN: CPT | Performed by: PSYCHIATRY & NEUROLOGY

## 2025-02-20 PROCEDURE — 92133 CPTRZD OPH DX IMG PST SGM ON: CPT | Performed by: PSYCHIATRY & NEUROLOGY

## 2025-02-20 ASSESSMENT — ENCOUNTER SYMPTOMS
EYES NEGATIVE: 1
RESPIRATORY NEGATIVE: 0
HEMATOLOGIC/LYMPHATIC NEGATIVE: 0
ENDOCRINE NEGATIVE: 0
PSYCHIATRIC NEGATIVE: 0
CARDIOVASCULAR NEGATIVE: 0
ALLERGIC/IMMUNOLOGIC NEGATIVE: 0
MUSCULOSKELETAL NEGATIVE: 0
NEUROLOGICAL NEGATIVE: 1
CONSTITUTIONAL NEGATIVE: 0
GASTROINTESTINAL NEGATIVE: 0

## 2025-02-20 ASSESSMENT — VISUAL ACUITY
OS_SC: CF @FACE
OS_SC: PH 20/30
OD_SC: CF @FACE
METHOD: SNELLEN - LINEAR
OD_SC: PH 20/25

## 2025-02-20 ASSESSMENT — TONOMETRY
OD_IOP_MMHG: 20
IOP_METHOD: TONOPEN
OS_IOP_MMHG: 19

## 2025-02-20 ASSESSMENT — SLIT LAMP EXAM - LIDS
COMMENTS: NORMAL
COMMENTS: NORMAL

## 2025-02-20 ASSESSMENT — CONF VISUAL FIELD
OS_INFERIOR_NASAL_RESTRICTION: 3
OD_INFERIOR_NASAL_RESTRICTION: 3
OS_SUPERIOR_NASAL_RESTRICTION: 3
OD_SUPERIOR_TEMPORAL_RESTRICTION: 3
OS_INFERIOR_TEMPORAL_RESTRICTION: 3
OD_INFERIOR_TEMPORAL_RESTRICTION: 3
OD_SUPERIOR_NASAL_RESTRICTION: 3
OS_SUPERIOR_TEMPORAL_RESTRICTION: 3

## 2025-02-20 ASSESSMENT — CUP TO DISC RATIO
OD_RATIO: 0.15
OS_RATIO: 0.15

## 2025-02-20 ASSESSMENT — EXTERNAL EXAM - RIGHT EYE: OD_EXAM: NORMAL

## 2025-02-20 ASSESSMENT — EXTERNAL EXAM - LEFT EYE: OS_EXAM: NORMAL

## 2025-02-20 NOTE — TELEPHONE ENCOUNTER
Noted and discussed with patient who verbalized understanding and had no questions at this time. Confirmed upcoming appointment on 5/25/21, at 2:05, but should plan to arrive 15 minutes prior to allow for door screening and check in process.    Sanket had thought that she had an allergic reaction to Ubrelvy at one point. She later determined that Ubrelvy was not the cause of her reaction. Ubrelvy is  a helpful treatment for her migraines.   She recently had a shunt placed.   Instructed that both Ubrelvy and Reyvow are safe treatments for her migraines after the shunt. She states understanding.

## 2025-02-20 NOTE — LETTER
"February 20, 2025     Avelino Tafoya MD  [id number]  Department Of Neurological Surgery  [facility] [id number]    Patient: Jonathan Ayala   YOB: 1975   Date of Visit: 2/20/2025     Dear Dr. Avelino Tafoya MD:    I am writing to share my findings regarding our shared patient Jonathan Ayala from her visit with me on 2/20/2025.    HPI    This 49 year-old woman with a history of left non-arteritic anterior ischemic optic neuropathy,  bilateral sequential vision loss with right eye improvement 11/13/2019, idiopathic intracranial hypertension status post ventriculoperitoneal shunt last revised 11/15/2024, seizures, scleroderma, Sjogren, CVID on monthly IVIG, POTS, HTN, DM2, hypothyroidism, BRENT on CPAP, migraine presents in follow up for evaluation of an interval visual disturbance.    She presented to the [facility] ED 2/15/2025 with concern for infection. The ventriculoperitoneal shunt valve setting was lowered 2/16/2025. She was discharged 2/18/2025.    She awoke with worse vision and headache. The right eye vision is worse. The problem started last night and is worse today. She took ubrogepant x 2, but she still has headache.  Last edited by Rob Mederos MD PhD on 2/20/2025  5:15 PM.        Diagnoses    Diagnoses and all orders for this visit:  IIH (idiopathic intracranial hypertension) (Primary)  -     OCT, Optic Nerve - OU - Both Eyes  NAION (non-arteritic anterior ischemic optic neuropathy), bilateral  Optic atrophy    Assessment and Plan    06/08/2021 +OKN response OD  09/30/2019 +OKN response OD    02/17/2025 MRI brain with contrast, which I personally reviewed, shows the right frontal approach ventriculoperitoneal shunt catheter with gliotic changes along the tract and pituitary flattening. Radiology also reported \"Postsurgical changes with stable positioning of right frontal approach ventricular catheter and findings suggestive of patency of the catheter. However, CSF flow/cine " "images demonstrate no definite flow within the shunt catheter. Stable size and configuration of the ventricular system when compared to prior exam from 02/17/2025 with no evidence of hydrocephalus.\"    02/15/2025 CT head without contrast, which I personally reviewed, shows right frontal approach ventriculoperitoneal shunt catheter.    Interval head imaging.    12/09/2024 CT head without contrast, which I personally reviewed previously, shows the right frontal ventriculoperitoneal shunt catheter.  11/15/2024 CT head without contrast, which I personally reviewed previously, shows the right frontal ventriculoperitoneal shunt catheter.  Interval head imaging.  10/29/2024 CT head without contrast, which I personally reviewed previously, shows the right frontal ventriculoperitoneal shunt catheter.  10/23/2024 MRI brain & orbits with contrast, which I personally reviewed previously, shows the right frontal ventriculoperitoneal shunt catheter.  10/23/2024 CT head without contrast, which I personally reviewed previously, shows the right frontal ventriculoperitoneal shunt catheter.  09/24/2024 CT head without contrast, which I personally reviewed previously, shows interval placement of a right frontal approach ventriculoperitoneal shunt.  09/23/2024 CT head without contrast, which I personally reviewed previously, shows no lesion.  09/21/2024 MRI brain without contrast, which I personally reviewed previously, shows stable findings.  09/21/2024 CT head without contrast & CTA head & neck, which I personally reviewed previously, shows the right frontal encephalomalacia.  09/17/2024 MRI brain & orbits with contrast & MRV head, which I personally reviewed previously, show stable right frontal encephalomalacia.  09/02/2024 CT head without contrast, which I personally reviewed previously, shows right frontal encephalomalacia stable from prior.  08/28/2024 CTA head and neck & CT head without contrast, which I personally reviewed " "previously, show right frontal encephalomalacia stable from prior.  08/06/2024 CTA head & neck & CT head without contrast, which I personally reviewed previously, show right frontal encephalomalacia stable from prior.  07/09/2024 CT head without contrast, which I personally reviewed previously, shows stable right frontal encephalomalacia.  06/05/2024 MRI orbits with contrast, which I personally reviewed previously, shows right frontal encephalomalacia similar to prior imaging.  11/07/2023 MRI brain without contrast, by report from [facility], shows \"No acute intracranial abnormality including no evidence of an acute or recent brain parenchymal infarct. \"  11/07/2023 CTA head & neck & CT head without contrast, by report from [facility], show \"No acute abnormality of the cervical and intracranial vasculature.\" And \"No evidence of an acute intracranial process.  Focal RIGHT frontal lobe encephalomalacia deep to a sherley hole, new from 02/25/2020. \"  08/01/2023 MRV head, which I personally reviewed previously, shows no lesion.  07/29/2023 MRI brain & orbits with contrast, which I personally reviewed previously, shows right frontal encephalomalacia consistent with prior bolt.  Interval head imaging.  10/05/2022 CT head without contrast & CTA head & neck, by report from [place], show \" 1.   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  Overlying right-sided sherley hole.)  2.  Prominence of the ventricles and sulci for age.  \" and \"1. Similar appearing old areas of infarction involving the right and left frontal lobes extending to the convexities with underlying encephalomalacia at site of prior hemorrhage from 02/13/2022. Overlying right-sided sherley hole. Prominence of the ventricles and sulci for age. No evidence of acute infarction. No active hemorrhage. 2. No " "significant stenosis internal carotid arteries. 3. No significant stenosis of the intracranial vessels or evidence of aneurysm. 4. Aberrant right subclavian artery behind the esophagus with no dilation of the proximal esophagus.\"  09/25/2022 CT head without contrast, which I personally reviewed previously, shows no lesion.  04/20/2022 CT head without contrast, which I personally reviewed previously, shows right frontal encephalomalacia judged stable by Radiology.  Interval head imaging.  09/10/2021 MRI brain with contrast, which I personally reviewed previously, shows no lesion.  09/09/2021 CTA head & neck, which I personally reviewed previously, shows no lesion.  09/09/2021 CT head without contrast, which I personally reviewed previously, shows no lesion.  08/11/2021 MRI brain & orbits with contrast & MRV head, which I personally reviewed previously, show left optic atrophy with Radiology noting “Punctate focus of enhancement seen along the left 7th-8th cranial nerve bundle at the level of the porus acusticus, indeterminate. Otherwise unremarkable MRI of the brain.”  Interval head imaging.  06/04/2021 MRI brain & orbits with contrast, which I personally reviewed previously, shows left optic atrophy.  Interval head imaging.  06/29/2017 MRI brain without contrast, which I personally reviewed previously, shows no lesion.  11/22/2007 CT head without contrast, which I personally reviewed previously, shows no lesion.    02/16/2025 lumbar puncture opening pressure 27 cm water, tube 1: RBC 40356, WBC 10, tube 4: RBC 14318 & WBC 17, protein 143 & glucose 87.  Interval cerebrospinal fluid (CSF) studies.  11/15/2024 RBC 3, WBC 1, protein 67 & glucose 92.  11/07/2024 , WBC 6, protein 69 & glucose 107.  09/18/2024 lumbar puncture tube 1: RBC 1000, WBC 6, tube 4:  & WBC 5, protein 79 & glucose 154. IgG studies with high albumin and albumin index with high cerebrospinal fluid (CSF) IgG/albumin ratio. Oligoclonal " bands negative. Cytology negative. Flow cytometry negative. Encephalopathy autoimmune evaluation negative. Meningitis pathogen panel, HSV PCR, VZV PCR, EBV PCR, toxoplasma PCR, West Nile IgG & IgM, VDRL, fungal smear negative.  08/31/2023 lumbar puncture opening pressure 52 cm water, tube 1: , WBC 0, tube 4: RBC 6 & WBC 0, protein 91 & glucose 71.  08/11/2021 lumbar puncture opening pressure 18 cm water, tube 1: RBC 0, WBC 16 (86% L, 13% M), tube 4: RBC 0 & WBC 16 (92% L, 8% M), protein 71 & glucose 61. Cytology negative. Flow cytometry negative. Oligoclonal bands 0. IgG studies with high CSF IgG & albumin.  08/05/2020 lumbar puncture opening pressure 20 cm water, protein 68, glucose 85. MBP wnl. Oligoclonal bands POSITIVE 4.  12/26/2019 lumbar puncture no opening pressure checked per patient) tube ?: RBC 39, WBC 6 (91% L, 9% M), tube ?: RBC 38, WBC 5, protein 89, glucose 79. (via [employer] from [employer])  11/13/2019 lumbar puncture opening pressure 22 cm water, tube 1: RBC 9, WBC 4, tube 4: RBC 1 & WBC 5, protein 82 & glucose 61. Oligoclonal bands 0. IgG studies with non-specific abnormalities. HSV PCR negative.  09/20/2019 lumbar puncture opening pressure 20 cm water, RBC 1, WBC 7 (96% L, 4% M), protein 94 & glucose 78.  01/31/2015 lumbar puncture RBC 2, WBC 0, protein 104 & glucose 74.    08/18/2024 Electrodiagnostic testing.  ffERG: Normal (OU).  Responses of L-/M-cones and S-cones: No abnormality can be detected (OU).  Responses of On- and Off-bipolar cells in cone pathway: Normal (OU).  PhNR (RGC function):  OD: Amplitude reduces mildly and implicit time prolongs mildly.  OS: Amplitude reduces moderately and implicit time prolongs mildly.  mfERG: No abnormality can be found (OU).  PVEP:  OD: Normal.  OS: Amplitude reduces severely.  Hemifield PVEP:  OD: Left-field PVEP amplitude is significantly lower than right-field PVEP.  OS: PVEP of both sides show no detectable components.  07/27/2019 multifocal ERG  with mildly reduced central responses.  Pattern VEP with OD borderline latency at 0.25 degrees and OS with severely prolonged latencies and decreased amplitudes.    Lab Results   Component Value Date/Time    SEDRATE 31 (H) 02/15/2025 2014    SEDRATE 24 (H) 01/28/2025 1108    SEDRATE 35 (H) 01/22/2025 2357    SEDRATE 52 (H) 01/08/2025 1707    SEDRATE 40 (H) 12/09/2024 1615    SEDRATE 21 (H) 11/15/2024 0552    SEDRATE 36 (H) 11/06/2024 2258    SEDRATE 19 08/01/2023 1558    SEDRATE 33 (H) 08/07/2022 0322    SEDRATE 19 05/25/2022 1501    SEDRATE 3 06/05/2020 1110    SEDRATE 6 05/13/2020 1529    SEDRATE 3 03/28/2020 0629    SEDRATE 5 09/16/2019 0507    SEDRATE 8 08/19/2019 1314    CRP 0.16 02/15/2025 2014    CRP 0.34 01/28/2025 1108    CRP 0.43 01/22/2025 2357    CRP 0.54 01/08/2025 1707    CRP 1.00 (H) 12/09/2024 1615    CRP 1.19 (H) 11/15/2024 0552    CRP 1.36 (H) 11/06/2024 2258    CRP 0.90 08/07/2022 0322    CRP 0.35 05/25/2022 1501    CRP 0.34 09/23/2021 0618    CRP 0.71 09/21/2021 0648    CRP 0.14 07/16/2020 0944    CRP 0.10 06/05/2020 1110    CRP <0.10 05/13/2020 1529    CRP <0.10 03/28/2020 0629    CRP 3.64 (A) 11/21/2019 2157    CRP <0.10 08/19/2019 1314      Lab Results   Component Value Date/Time    XDEDZMWB18 383 09/17/2024 0242    RGEBYYPA94 299 02/23/2023 0941    PXXRIHQN72 709 02/09/2021 0451    SKJAFVCF08 508 12/10/2019 1349    FOLATE >24.0 09/17/2024 0242    RCFOL 951 12/10/2019 1349    CQNK2FG 142 09/17/2024 0250    VTAI Normal 09/17/2024 0242    VITAMINA 0.53 09/17/2024 0242    VTAR 0.02 09/17/2024 0242      Lab Results   Component Value Date/Time    NMOAQP4 Negative 09/17/2024 0242    NMOAQP4 Negative 11/14/2019 1447    MOGFACS Negative 09/17/2024 0242    MOGFACS Negative 11/10/2020 1000    MOGFACS Negative 11/14/2019 1447    ACE 41 09/17/2024 0242      Tuberculosis studies  Lab Results   Component Value Date/Time    TBSIN Negative 09/17/2024 0250    TBSIN Negative 09/22/2021 0430    TBSIN Negative  11/10/2020 1000    TBSIN Negative 11/14/2019 0531      Lab Results   Component Value Date/Time    UEXNQZPO51 383 09/17/2024 0242    LBPYZSFO23 299 02/23/2023 0941    PTLRHOTG68 709 02/09/2021 0451    MNPZNSFT02 508 12/10/2019 1349    FOLATE >24.0 09/17/2024 0242    RCFOL 951 12/10/2019 1349    AFJB8DG 142 09/17/2024 0250    VTAI Normal 09/17/2024 0242    VITAMINA 0.53 09/17/2024 0242    VTAR 0.02 09/17/2024 0242      09/17/2024 syphilis REACTIVE. Treponema confirm & RPR non-reactive (NR). T-SPOT TB negative. Bartonella abs, Lyme PCR, RMSF abs, Toxoplasma IgG & IgM negative.  12/04/2023 ESR 10. CRP <0.3 mg/dL.  10/01/2023 syphilis non-reactive (NR).  09/18/2020 ESR 1. CRP < 0.08 mg/dL (0.8 mg/L).  08/28/2020 HbA1c HIGH 7.0. Lipid panel with LDL 64.  08/05/2020 B12 462. Folate wnl. AQP4 ab negative.  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 Suarez.  07/09/2019 BRETT > 1:640. Anti-centromere HIGH 101 (0-40). scl-70 negative. Chromatin ab IgG, anti-ribosomal ab, anti-Sarahy ab, anti-DNA ab negative.    02/20/2025 OCT RNFL OD 46 & OS 41. (Stable)  12/16/2024 OCT RNFL OD 48 & OS 38.  11/05/2024 OCT RNFL OD 81 with T & I thinning & OS 37. (Lower OD & stable OS)  10/23/2024 OCT RNFL  & OS 41. (Lower OD & stable OS)  10/03/2024 OCT RNFL  & OS 39. (Lower OD & stable OS)  09/16/2024 OCT RNFL  & OS 36. (Interval new elevation OD & stable thinning OS)  07/25/2024 OCT RNFL OD 89 & OS 37. (Decrease, now at baseline of early 2024)  06/26/2024 OCT RNFL OD 95 & OS 41. (Stable)  06/05/2024 OCT RNFL OD 95 & OS 40. (Stable)  OCT macula OD normal foveal contour 264 & OS normal foveal contour 234.  03/15/2024 OCT RNFL OD 92 & OS 36. (Stable)  01/09/2024 OCT RNFL OD 89 & OS 36. (Stable)  02/06/2023 OCT RNFL OD 92 & OS 38. (stable)  OCT macula OD normal foveal contour 255 & OS thinning RNFL miscentered wrt fovea.  10/07/2022 OCT RNFL OD 92 & OS 40. (decreased OD & stable OS)  09/23/2022 OCT RNFL OD  98 & OS 38. (increased OD & stable OS)  01/27/2022 OCT RNFL OD 88 & OS 37. (stable)  10/06/2021 OCT RNFL OD 93 & OS 39 (stable)..  08/19/2021 OCT RNFL OD 92 & OS 38. (stable)  06/08/2021 OCT RNFL OD 87 & OS 37. (stable)  01/28/2021 OCT RNFL OD 85 & OS 37. (stable)  11/06/2020 OCT RNFL OD 89 & OS 39. (stable)  07/27/2020 OCT RNFL OD 89 & OS 38. (stable)  01/07/2020 OCT RNFL OD 93 & OS 38. (stable to mild increase OD, stable to mild decrease OS)  11/27/2019 OCT RNFL OD 84 & OS 42. (stable to mild OD decrease)  09/30/2019 OCT RNFL OD 89 & OS 41. (stable)  07/18/2019 OCT RNFL OD 90 & OS 39.  OCT macula OD normal foveal contour 256 & OS thinning of RNFL & GCL, but preserved foveal contour 238.    12/16/2024 HVF 24-2 OD stimulus V, fovea <0, FL 7/12, FL 0/6, FN 1/5, generalized depression with some relative superior temporal sparing & OS stimulus V, fovea 9, FL 0/16, FP 0/10, FN 6/9, generalized depression.  11/05/2024 HVF 24-2 OD stimulus V, fovea <0, inferior altitudinal & superior arcuate (some superior temporal sparing) & OS stimulus V, fovea 22, central, superior nasal step & inferior arcuate defects.  10/03/2024 HVF 24-2 OD fovea 4, generalized depression MD -29.75 & OS stimulus V, fovea 12, central, superior nasal step & inferior arcuate defects.  09/16/2024 HVF 24-2 OD stimulus III, fovea 14, generalized depression MD -31.43 & OS stimulus V, fovea 1, generalized depression with some superior temporal sparing.  07/25/2024 HVF 24-2 OD stimulus V, fovea 39, superior > inferior nasal step & OS stimulus V, fovea 18, generalized depression with some superior temporal sparing (inferior > superior altitudinal.  06/26/2024 HVF 24-2 OD fovea 33, FL 3/15, FP 0%, FN 20%, superior arcuate MD -13.69 & OS stimulus V, fovea 27, generalized depression.  06/05/2024 HVF 24-2 OD fovea 34, FL 2/16, FP 0%, FN 40%, superior altitudinal MD -18.00 & OS stimulus V, fovea 25, generalized depression.  03/15/2024 HVF 24-2 OD fovea 36, wnl MD  -1.05 & OS stimulus V, fovea 3, inferior arcuate > superior arcuate.  01/09/2024 HVF 24-2 OD wnl MD -0.94 & OS generalized depression MD -32.10.  02/06/2023 HVF 24-2 OD fovea 36, FL 1/17, FP 0%< FN 15%, scatter MD -7.58 & OS stimulus V, fovea 19, generalized depression.  10/07/2022 HVF 24-2 OD fovea 35, wnl MD -1.26 & OS fovea 5, generalized depression MD -30.62.  09/23/2022 HVF 24-2 OD fovea 38, scatter MD -2.67 & OS stimulus V, fovea 16, superior arcuate & inferior arcuate.  01/27/2022 HVF 24-2 OD fovea 36, wnl MD -1.67 & OS stimulus V, fovea 28, generalized reduction.  10/06/2021 HVF 24-2 OD fovea 36, scatter MD -4.76 & OS stimulus V, generalized reduction.  08/19/2021 HVF 24-2 OD fovea 39, wnl MD -2.31 & OS stimulus V, fovea 28, generalized depression.  06/08/2021 HVF Stimulus V OD fovea 34, wnl & OS stimulus V, fovea 24, generalized depression.  01/28/2021 HVF 24-2 OD fovea 40, wnl MD -0.63 & OS stimulus V, fovea 28, superior nasal step & inferior arcuate.  11/06/2020 HVF 24-2 OD fovea 32, possible inferior > superior arcuate MD -14.71 & OS stimulus V, fovea 10, generalized depression.  07/27/2020 HVF 24-2 OD fovea 39, wnl MD -2.45 & OS stimulus V, fovea 27, superior & inferior altitudinal.    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    Near vision remains stable with limited peripheral vision and continued optic atrophy. I do not see optic disc edema to indicate recurrent papilledema. As before, her optic atrophy reflects a combination of prior non-arteritic anterior ischemic optic neuropathy and  post-papilledema effects of idiopathic intracranial hypertension.    At this point, I think headache is attributable to migraine without evidence of papilledema.    Plan    Shunt care with Dr. Tafoya.  Continue furosemide with possible tapering in a few months.  Vasculopathic risk factor control.  Low vision services.    Follow up in 2-3 months with near vision including pinhole, Sharpe visual field (HVF) & OCT. (dilated 6/5/2024)      Below you will find my full examination. I appreciate the opportunity to see Jonathan Ayala today and to share in her care with you. Please contact me if you have questions for me regarding this visit or if I can be of assistance to another of your patients with neuro-ophthalmological problems.    Sincerely,    Rob Mederos MD PhD    CC:   No Recipients      Base Eye Exam       Visual Acuity (Snellen - Linear)         Right Left    Dist sc CF @face CF @face    Near sc ph 20/25 ph 20/30              Tonometry (Tonopen, 3:53 PM)         Right Left    Pressure 20 19              Pupils         Dark Light Shape React APD    Right 7 6 Round Minimal None    Left 7 6 Round Minimal None              Visual Fields         Left Right    Restrictions Partial outer superior temporal, inferior temporal, superior nasal, inferior nasal deficiencies Partial outer superior temporal, inferior temporal, superior nasal, inferior nasal deficiencies              Extraocular Movement         Right Left     Full, Ortho Full, Ortho              Neuro/Psych       Oriented x3: Yes    Mood/Affect: Normal                  Slit Lamp and Fundus Exam       External Exam         Right Left    External Normal Normal              Slit Lamp Exam         Right Left    Lids/Lashes Normal Normal    Conjunctiva/Sclera White and quiet White and quiet    Cornea Clear Clear    Anterior Chamber Deep and quiet Deep and quiet    Iris Round and reactive Round and reactive    Lens Clear Clear    Anterior Vitreous Normal Normal               Fundus Exam         Right Left    Disc pallor temporally, no disc edema Pallor, no disc edema    C/D Ratio 0.15 0.15    Macula Normal Normal    Vessels Normal Normal    Periphery Normal Normal

## 2025-02-20 NOTE — PROGRESS NOTES
"Assessment and Plan    06/08/2021 +OKN response OD  09/30/2019 +OKN response OD    02/17/2025 MRI brain with contrast, which I personally reviewed, shows the right frontal approach ventriculoperitoneal shunt catheter with gliotic changes along the tract and pituitary flattening. Radiology also reported \"Postsurgical changes with stable positioning of right frontal approach ventricular catheter and findings suggestive of patency of the catheter. However, CSF flow/cine images demonstrate no definite flow within the shunt catheter. Stable size and configuration of the ventricular system when compared to prior exam from 02/17/2025 with no evidence of hydrocephalus.\"    02/15/2025 CT head without contrast, which I personally reviewed, shows right frontal approach ventriculoperitoneal shunt catheter.    Interval head imaging.    12/09/2024 CT head without contrast, which I personally reviewed previously, shows the right frontal ventriculoperitoneal shunt catheter.  11/15/2024 CT head without contrast, which I personally reviewed previously, shows the right frontal ventriculoperitoneal shunt catheter.  Interval head imaging.  10/29/2024 CT head without contrast, which I personally reviewed previously, shows the right frontal ventriculoperitoneal shunt catheter.  10/23/2024 MRI brain & orbits with contrast, which I personally reviewed previously, shows the right frontal ventriculoperitoneal shunt catheter.  10/23/2024 CT head without contrast, which I personally reviewed previously, shows the right frontal ventriculoperitoneal shunt catheter.  09/24/2024 CT head without contrast, which I personally reviewed previously, shows interval placement of a right frontal approach ventriculoperitoneal shunt.  09/23/2024 CT head without contrast, which I personally reviewed previously, shows no lesion.  09/21/2024 MRI brain without contrast, which I personally reviewed previously, shows stable findings.  09/21/2024 CT head without " "contrast & CTA head & neck, which I personally reviewed previously, shows the right frontal encephalomalacia.  09/17/2024 MRI brain & orbits with contrast & MRV head, which I personally reviewed previously, show stable right frontal encephalomalacia.  09/02/2024 CT head without contrast, which I personally reviewed previously, shows right frontal encephalomalacia stable from prior.  08/28/2024 CTA head and neck & CT head without contrast, which I personally reviewed previously, show right frontal encephalomalacia stable from prior.  08/06/2024 CTA head & neck & CT head without contrast, which I personally reviewed previously, show right frontal encephalomalacia stable from prior.  07/09/2024 CT head without contrast, which I personally reviewed previously, shows stable right frontal encephalomalacia.  06/05/2024 MRI orbits with contrast, which I personally reviewed previously, shows right frontal encephalomalacia similar to prior imaging.  11/07/2023 MRI brain without contrast, by report from [facility], shows \"No acute intracranial abnormality including no evidence of an acute or recent brain parenchymal infarct. \"  11/07/2023 CTA head & neck & CT head without contrast, by report from [facility], show \"No acute abnormality of the cervical and intracranial vasculature.\" And \"No evidence of an acute intracranial process.  Focal RIGHT frontal lobe encephalomalacia deep to a sherley hole, new from 02/25/2020. \"  08/01/2023 MRV head, which I personally reviewed previously, shows no lesion.  07/29/2023 MRI brain & orbits with contrast, which I personally reviewed previously, shows right frontal encephalomalacia consistent with prior bolt.  Interval head imaging.  10/05/2022 CT head without contrast & CTA head & neck, by report from [place], show \" 1.   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  Overlying right-sided sherley hole.)  2.  Prominence of the ventricles and sulci for age.  \" and \"1. Similar appearing old areas of infarction involving the right and left frontal lobes extending to the convexities with underlying encephalomalacia at site of prior hemorrhage from 02/13/2022. Overlying right-sided sherley hole. Prominence of the ventricles and sulci for age. No evidence of acute infarction. No active hemorrhage. 2. No significant stenosis internal carotid arteries. 3. No significant stenosis of the intracranial vessels or evidence of aneurysm. 4. Aberrant right subclavian artery behind the esophagus with no dilation of the proximal esophagus.\"  09/25/2022 CT head without contrast, which I personally reviewed previously, shows no lesion.  04/20/2022 CT head without contrast, which I personally reviewed previously, shows right frontal encephalomalacia judged stable by Radiology.  Interval head imaging.  09/10/2021 MRI brain with contrast, which I personally reviewed previously, shows no lesion.  09/09/2021 CTA head & neck, which I personally reviewed previously, shows no lesion.  09/09/2021 CT head without contrast, which I personally reviewed previously, shows no lesion.  08/11/2021 MRI brain & orbits with contrast & MRV head, which I personally reviewed previously, show left optic atrophy with Radiology noting “Punctate focus of enhancement seen along the left 7th-8th cranial nerve bundle at the level of the porus acusticus, indeterminate. Otherwise unremarkable MRI of the brain.”  Interval head imaging.  06/04/2021 MRI brain & orbits with contrast, which I personally reviewed previously, shows left optic atrophy.  Interval head imaging.  06/29/2017 MRI brain without contrast, which I personally reviewed previously, shows no lesion.  11/22/2007 CT head without contrast, which I personally reviewed previously, shows no lesion.    02/16/2025 lumbar puncture opening " pressure 27 cm water, tube 1: RBC 54636, WBC 10, tube 4: RBC 02391 & WBC 17, protein 143 & glucose 87.  Interval cerebrospinal fluid (CSF) studies.  11/15/2024 RBC 3, WBC 1, protein 67 & glucose 92.  11/07/2024 , WBC 6, protein 69 & glucose 107.  09/18/2024 lumbar puncture tube 1: RBC 1000, WBC 6, tube 4:  & WBC 5, protein 79 & glucose 154. IgG studies with high albumin and albumin index with high cerebrospinal fluid (CSF) IgG/albumin ratio. Oligoclonal bands negative. Cytology negative. Flow cytometry negative. Encephalopathy autoimmune evaluation negative. Meningitis pathogen panel, HSV PCR, VZV PCR, EBV PCR, toxoplasma PCR, West Nile IgG & IgM, VDRL, fungal smear negative.  08/31/2023 lumbar puncture opening pressure 52 cm water, tube 1: , WBC 0, tube 4: RBC 6 & WBC 0, protein 91 & glucose 71.  08/11/2021 lumbar puncture opening pressure 18 cm water, tube 1: RBC 0, WBC 16 (86% L, 13% M), tube 4: RBC 0 & WBC 16 (92% L, 8% M), protein 71 & glucose 61. Cytology negative. Flow cytometry negative. Oligoclonal bands 0. IgG studies with high CSF IgG & albumin.  08/05/2020 lumbar puncture opening pressure 20 cm water, protein 68, glucose 85. MBP wnl. Oligoclonal bands POSITIVE 4.  12/26/2019 lumbar puncture no opening pressure checked per patient) tube ?: RBC 39, WBC 6 (91% L, 9% M), tube ?: RBC 38, WBC 5, protein 89, glucose 79. (via [employer] from [employer])  11/13/2019 lumbar puncture opening pressure 22 cm water, tube 1: RBC 9, WBC 4, tube 4: RBC 1 & WBC 5, protein 82 & glucose 61. Oligoclonal bands 0. IgG studies with non-specific abnormalities. HSV PCR negative.  09/20/2019 lumbar puncture opening pressure 20 cm water, RBC 1, WBC 7 (96% L, 4% M), protein 94 & glucose 78.  01/31/2015 lumbar puncture RBC 2, WBC 0, protein 104 & glucose 74.    08/18/2024 Electrodiagnostic testing.  ffERG: Normal (OU).  Responses of L-/M-cones and S-cones: No abnormality can be detected (OU).  Responses of On- and  Off-bipolar cells in cone pathway: Normal (OU).  PhNR (RGC function):  OD: Amplitude reduces mildly and implicit time prolongs mildly.  OS: Amplitude reduces moderately and implicit time prolongs mildly.  mfERG: No abnormality can be found (OU).  PVEP:  OD: Normal.  OS: Amplitude reduces severely.  Hem[place]ield PVEP:  OD: Left-field PVEP amplitude is significantly lower than right-field PVEP.  OS: PVEP of both sides show no detectable components.  07/27/2019 multifocal ERG with mildly reduced central responses.  Pattern VEP with OD borderline latency at 0.25 degrees and OS with severely prolonged latencies and decreased amplitudes.    Lab Results   Component Value Date/Time    SEDRATE 31 (H) 02/15/2025 2014    SEDRATE 24 (H) 01/28/2025 1108    SEDRATE 35 (H) 01/22/2025 2357    SEDRATE 52 (H) 01/08/2025 1707    SEDRATE 40 (H) 12/09/2024 1615    SEDRATE 21 (H) 11/15/2024 0552    SEDRATE 36 (H) 11/06/2024 2258    SEDRATE 19 08/01/2023 1558    SEDRATE 33 (H) 08/07/2022 0322    SEDRATE 19 05/25/2022 1501    SEDRATE 3 06/05/2020 1110    SEDRATE 6 05/13/2020 1529    SEDRATE 3 03/28/2020 0629    SEDRATE 5 09/16/2019 0507    SEDRATE 8 08/19/2019 1314    CRP 0.16 02/15/2025 2014    CRP 0.34 01/28/2025 1108    CRP 0.43 01/22/2025 2357    CRP 0.54 01/08/2025 1707    CRP 1.00 (H) 12/09/2024 1615    CRP 1.19 (H) 11/15/2024 0552    CRP 1.36 (H) 11/06/2024 2258    CRP 0.90 08/07/2022 0322    CRP 0.35 05/25/2022 1501    CRP 0.34 09/23/2021 0618    CRP 0.71 09/21/2021 0648    CRP 0.14 07/16/2020 0944    CRP 0.10 06/05/2020 1110    CRP <0.10 05/13/2020 1529    CRP <0.10 03/28/2020 0629    CRP 3.64 (A) 11/21/2019 2157    CRP <0.10 08/19/2019 1314      Lab Results   Component Value Date/Time    NQRYJFIP71 383 09/17/2024 0242    YKWQCWAZ13 299 02/23/2023 0941    KWMCALBH35 709 02/09/2021 0451    MKFZGSUP48 508 12/10/2019 1349    FOLATE >24.0 09/17/2024 0242    RCFOL 951 12/10/2019 1349    HZLT5IA 142 09/17/2024 0250    VTAI Normal  09/17/2024 0242    VITAMINA 0.53 09/17/2024 0242    VTAR 0.02 09/17/2024 0242      Lab Results   Component Value Date/Time    NMOAQP4 Negative 09/17/2024 0242    NMOAQP4 Negative 11/14/2019 1447    MOGFACS Negative 09/17/2024 0242    MOGFACS Negative 11/10/2020 1000    MOGFACS Negative 11/14/2019 1447    ACE 41 09/17/2024 0242      Tuberculosis studies  Lab Results   Component Value Date/Time    TBSIN Negative 09/17/2024 0250    TBSIN Negative 09/22/2021 0430    TBSIN Negative 11/10/2020 1000    TBSIN Negative 11/14/2019 0531      Lab Results   Component Value Date/Time    RXYXQBSM86 383 09/17/2024 0242    WMTCIRXJ36 299 02/23/2023 0941    DRVDWIHR09 709 02/09/2021 0451    GTPNUELV73 508 12/10/2019 1349    FOLATE >24.0 09/17/2024 0242    RCFOL 951 12/10/2019 1349    CQFH5BB 142 09/17/2024 0250    VTAI Normal 09/17/2024 0242    VITAMINA 0.53 09/17/2024 0242    VTAR 0.02 09/17/2024 0242      09/17/2024 syphilis REACTIVE. Treponema confirm & RPR non-reactive (NR). T-SPOT TB negative. Bartonella abs, Lyme PCR, RMSF abs, Toxoplasma IgG & IgM negative.  12/04/2023 ESR 10. CRP <0.3 mg/dL.  10/01/2023 syphilis non-reactive (NR).  09/18/2020 ESR 1. CRP < 0.08 mg/dL (0.8 mg/L).  08/28/2020 HbA1c HIGH 7.0. Lipid panel with LDL 64.  08/05/2020 B12 462. Folate wnl. AQP4 ab negative.  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 Suarez.  07/09/2019 BRETT > 1:640. Anti-centromere HIGH 101 (0-40). scl-70 negative. Chromatin ab IgG, anti-ribosomal ab, anti-Sarahy ab, anti-DNA ab negative.    02/20/2025 OCT RNFL OD 46 & OS 41. (Stable)  12/16/2024 OCT RNFL OD 48 & OS 38.  11/05/2024 OCT RNFL OD 81 with T & I thinning & OS 37. (Lower OD & stable OS)  10/23/2024 OCT RNFL  & OS 41. (Lower OD & stable OS)  10/03/2024 OCT RNFL  & OS 39. (Lower OD & stable OS)  09/16/2024 OCT RNFL  & OS 36. (Interval new elevation OD & stable thinning OS)  07/25/2024 OCT RNFL OD 89 & OS 37. (Decrease, now at baseline of  early 2024)  06/26/2024 OCT RNFL OD 95 & OS 41. (Stable)  06/05/2024 OCT RNFL OD 95 & OS 40. (Stable)  OCT macula OD normal foveal contour 264 & OS normal foveal contour 234.  03/15/2024 OCT RNFL OD 92 & OS 36. (Stable)  01/09/2024 OCT RNFL OD 89 & OS 36. (Stable)  02/06/2023 OCT RNFL OD 92 & OS 38. (stable)  OCT macula OD normal foveal contour 255 & OS thinning RNFL miscentered wrt fovea.  10/07/2022 OCT RNFL OD 92 & OS 40. (decreased OD & stable OS)  09/23/2022 OCT RNFL OD 98 & OS 38. (increased OD & stable OS)  01/27/2022 OCT RNFL OD 88 & OS 37. (stable)  10/06/2021 OCT RNFL OD 93 & OS 39 (stable)..  08/19/2021 OCT RNFL OD 92 & OS 38. (stable)  06/08/2021 OCT RNFL OD 87 & OS 37. (stable)  01/28/2021 OCT RNFL OD 85 & OS 37. (stable)  11/06/2020 OCT RNFL OD 89 & OS 39. (stable)  07/27/2020 OCT RNFL OD 89 & OS 38. (stable)  01/07/2020 OCT RNFL OD 93 & OS 38. (stable to mild increase OD, stable to mild decrease OS)  11/27/2019 OCT RNFL OD 84 & OS 42. (stable to mild OD decrease)  09/30/2019 OCT RNFL OD 89 & OS 41. (stable)  07/18/2019 OCT RNFL OD 90 & OS 39.  OCT macula OD normal foveal contour 256 & OS thinning of RNFL & GCL, but preserved foveal contour 238.    12/16/2024 HVF 24-2 OD stimulus V, fovea <0, FL 7/12, FL 0/6, FN 1/5, generalized depression with some relative superior temporal sparing & OS stimulus V, fovea 9, FL 0/16, FP 0/10, FN 6/9, generalized depression.  11/05/2024 HVF 24-2 OD stimulus V, fovea <0, inferior altitudinal & superior arcuate (some superior temporal sparing) & OS stimulus V, fovea 22, central, superior nasal step & inferior arcuate defects.  10/03/2024 HVF 24-2 OD fovea 4, generalized depression MD -29.75 & OS stimulus V, fovea 12, central, superior nasal step & inferior arcuate defects.  09/16/2024 [facility] 24-2 OD stimulus III, fovea 14, generalized depression MD -31.43 & OS stimulus V, fovea 1, generalized depression with some superior temporal sparing.  07/25/2024 HV 24-2 OD stimulus  V, fovea 39, superior > inferior nasal step & OS stimulus V, fovea 18, generalized depression with some superior temporal sparing (inferior > superior altitudinal.  06/26/2024 HVF 24-2 OD fovea 33, FL 3/15, FP 0%, FN 20%, superior arcuate MD -13.69 & OS stimulus V, fovea 27, generalized depression.  06/05/2024 HVF 24-2 OD fovea 34, FL 2/16, FP 0%, FN 40%, superior altitudinal MD -18.00 & OS stimulus V, fovea 25, generalized depression.  03/15/2024 HVF 24-2 OD fovea 36, wnl MD -1.05 & OS stimulus V, fovea 3, inferior arcuate > superior arcuate.  01/09/2024 HVF 24-2 OD wnl MD -0.94 & OS generalized depression MD -32.10.  02/06/2023 HVF 24-2 OD fovea 36, FL 1/17, FP 0%< FN 15%, scatter MD -7.58 & OS stimulus V, fovea 19, generalized depression.  10/07/2022 HVF 24-2 OD fovea 35, wnl MD -1.26 & OS fovea 5, generalized depression MD -30.62.  09/23/2022 HVF 24-2 OD fovea 38, scatter MD -2.67 & OS stimulus V, fovea 16, superior arcuate & inferior arcuate.  01/27/2022 HVF 24-2 OD fovea 36, wnl MD -1.67 & OS stimulus V, fovea 28, generalized reduction.  10/06/2021 HVF 24-2 OD fovea 36, scatter MD -4.76 & OS stimulus V, generalized reduction.  08/19/2021 HVF 24-2 OD fovea 39, wnl MD -2.31 & OS stimulus V, fovea 28, generalized depression.  06/08/2021 HVF Stimulus V OD fovea 34, wnl & OS stimulus V, fovea 24, generalized depression.  01/28/2021 HVF 24-2 OD fovea 40, wnl MD -0.63 & OS stimulus V, fovea 28, superior nasal step & inferior arcuate.  11/06/2020 HVF 24-2 OD fovea 32, possible inferior > superior arcuate MD -14.71 & OS stimulus V, fovea 10, generalized depression.  07/27/2020 HVF 24-2 OD fovea 39, wnl MD -2.45 & OS stimulus V, fovea 27, superior & inferior altitudinal.    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    Near vision remains stable with limited peripheral vision and continued optic atrophy. I do not see optic disc edema to indicate recurrent papilledema. As before, her optic atrophy reflects a combination of prior non-arteritic anterior ischemic optic neuropathy and post-papilledema effects of idiopathic intracranial hypertension.    At this point, I think headache is attributable to migraine without evidence of papilledema.    Plan    Shunt care with Dr. Tafoya.  Continue furosemide with possible tapering in a few months.  Vasculopathic risk factor control.  Low vision services.    Follow up as currently scheduled 4/4/2025 with near vision including pinhole, Sharpe visual field (HVF) & OCT. (dilated 6/5/2024)

## 2025-02-21 ENCOUNTER — PHARMACY VISIT (OUTPATIENT)
Dept: PHARMACY | Facility: CLINIC | Age: 50
End: 2025-02-21
Payer: MEDICAID

## 2025-02-21 ENCOUNTER — PATIENT OUTREACH (OUTPATIENT)
Dept: PRIMARY CARE | Facility: CLINIC | Age: 50
End: 2025-02-21
Payer: COMMERCIAL

## 2025-02-21 DIAGNOSIS — Z98.2 VP (VENTRICULOPERITONEAL) SHUNT STATUS: ICD-10-CM

## 2025-02-21 DIAGNOSIS — E11.43 TYPE 2 DIABETES MELLITUS WITH DIABETIC AUTONOMIC NEUROPATHY, WITH LONG-TERM CURRENT USE OF INSULIN: ICD-10-CM

## 2025-02-21 DIAGNOSIS — Z79.4 TYPE 2 DIABETES MELLITUS WITH DIABETIC AUTONOMIC NEUROPATHY, WITH LONG-TERM CURRENT USE OF INSULIN: ICD-10-CM

## 2025-02-21 LAB — BACTERIA BLD CULT: NORMAL

## 2025-02-21 NOTE — PROGRESS NOTES
Returned call to pts cm at [facility]. Await a call back   Christiana Guillen RN at [facility]    , [email or web address]   She stated that [facility] would pay for the WC so we can send to a dme company      She stated [facility] covers [employer].   She stated that [employer] would likely help to cover the vision aids, so to update pts CM stella  Sent email to her ([email or web address]) to inquire on that process.   Verified that [facility] pays for her depends and incontinence supplies are through [employer]

## 2025-02-23 LAB
BACTERIA CSF CULT: NORMAL
GRAM STN SPEC: NORMAL
GRAM STN SPEC: NORMAL

## 2025-02-25 ENCOUNTER — SPECIALTY PHARMACY (OUTPATIENT)
Dept: PHARMACY | Facility: CLINIC | Age: 50
End: 2025-02-25

## 2025-02-26 ENCOUNTER — OFFICE VISIT (OUTPATIENT)
Dept: NEUROSURGERY | Facility: HOSPITAL | Age: 50
End: 2025-02-26
Payer: COMMERCIAL

## 2025-02-26 VITALS
BODY MASS INDEX: 44.22 KG/M2 | HEART RATE: 97 BPM | RESPIRATION RATE: 17 BRPM | DIASTOLIC BLOOD PRESSURE: 83 MMHG | WEIGHT: 240.3 LBS | HEIGHT: 62 IN | SYSTOLIC BLOOD PRESSURE: 153 MMHG

## 2025-02-26 DIAGNOSIS — G93.2 IDIOPATHIC INTRACRANIAL HYPERTENSION: Primary | ICD-10-CM

## 2025-02-26 PROCEDURE — 1036F TOBACCO NON-USER: CPT | Performed by: NEUROLOGICAL SURGERY

## 2025-02-26 PROCEDURE — 3079F DIAST BP 80-89 MM HG: CPT | Performed by: NEUROLOGICAL SURGERY

## 2025-02-26 PROCEDURE — 99211 OFF/OP EST MAY X REQ PHY/QHP: CPT | Mod: 25 | Performed by: NEUROLOGICAL SURGERY

## 2025-02-26 PROCEDURE — 3077F SYST BP >= 140 MM HG: CPT | Performed by: NEUROLOGICAL SURGERY

## 2025-02-26 PROCEDURE — 3008F BODY MASS INDEX DOCD: CPT | Performed by: NEUROLOGICAL SURGERY

## 2025-02-26 PROCEDURE — 62252 CSF SHUNT REPROGRAM: CPT | Performed by: NEUROLOGICAL SURGERY

## 2025-02-26 ASSESSMENT — PAIN SCALES - GENERAL: PAINLEVEL_OUTOF10: 6

## 2025-02-26 NOTE — PROGRESS NOTES
49-year-old woman with shunted idiopathic intracranial hypertension returns for follow-up.  She feels that the pressure sensation in her head has returned.  She was recently seen by ophthalmology who did not notice any evidence of recurrent papilledema.    Review of Systems    Visit Vitals  OB Status Hysterectomy   Smoking Status Never           Current Outpatient Medications:     acetaminophen (Tylenol) 325 mg tablet, Take 1-2 tablets (325-650 mg) by mouth every 6 hours if needed for mild pain (1 - 3). Days of infusions, Disp: , Rfl:     Advair -21 mcg/actuation inhaler, INHALE TWO PUFFS BY MOUTH AS INSTRUCTED TWO TIMES A DAY., Disp: , Rfl:     amitriptyline (Elavil) 100 mg tablet, Take 1 tablet (100 mg) by mouth once daily at bedtime., Disp: 30 tablet, Rfl: 11    azelastine (Astelin) 137 mcg (0.1 %) nasal spray, Administer 1 spray into each nostril 2 times a day. Use in each nostril as directed, Disp: , Rfl:     blood-glucose sensor (DEXCOM G7 SENSOR MISC), Use to check blood sugar continuously throughout the day as directed. Change sensor every 10 days., Disp: , Rfl:     calcium-vitamin D3-vitamin K (Viactiv) 650 mg-12.5 mcg-40 mcg chewable tablet, Chew 2 tablets once daily. At lunch, Disp: , Rfl:     carboxymethylcellulose (Refresh Celluvisc) 1 % ophthalmic solution dropperette, Administer 2 drops into both eyes 3 times a day as needed for dry eyes., Disp: , Rfl:     cholecalciferol (Vitamin D-3) 5,000 Units tablet, Take 1 tablet (5,000 Units) by mouth once daily., Disp: , Rfl:     clonazePAM (KlonoPIN) 0.5 mg tablet, Take 1 tablet (0.5 mg) by mouth 2 times a day., Disp: , Rfl:     Dexcom G7  misc, Use as instructed to check blood sugars continuously throughout the day, Disp: , Rfl:     divalproex (Depakote ER) 500 mg 24 hr tablet, Take 1 tablet (500 mg) by mouth 2 times a day., Disp: , Rfl:     EPINEPHrine 0.3 mg/0.3 mL injection syringe, INJECT INTRAMUSCULARLY ONCE AS NEEDED FOR ANAPHYLAXIS,  Disp: 2 each, Rfl: 0    ezetimibe (Zetia) 10 mg tablet, Take 1 tablet (10 mg) by mouth once daily., Disp: 30 tablet, Rfl: 11    fluconazole (Diflucan) 150 mg tablet, 1 tablet by mouth daily spread 72 hours apart, Disp: 2 tablet, Rfl: 6    fluticasone (Flonase) 50 mcg/actuation nasal spray, USE 1 SPRAY INTO EACH NOSTRIL ONCE DAILY., Disp: 16 g, Rfl: 3    folic acid (Folvite) 1 mg tablet, Take 1 tablet (1 mg) by mouth once daily., Disp: 90 tablet, Rfl: 3    furosemide (Lasix) 20 mg tablet, Take 1 tablet (20 mg) by mouth 2 times a day., Disp: 180 tablet, Rfl: 3    gloves, latex with aloe vera misc, Large size gloves, Disp: 200 each, Rfl: 3    glucagon (Baqsimi) 3 mg/actuation spray,non-aerosol, USE ONE SPRAY IN THE NOSE AS NEEDED FOR LOW BLOOD SUGAR  MAY REPEAT AFTER 15 MINUTES USING A NEW DEVICE IF NO RESPONSE, Disp: 1 each, Rfl: 3    glucagon (Glucagen) 1 mg injection, Inject 1 mg under the skin 1 time if needed for low blood sugar - see comments (hypoglycemia)., Disp: 1 each, Rfl: 12    hydrocortisone (Cortef) 10 mg tablet, Take 1 tablet (10 mg) by mouth 2 times a day. Double the dose if sick for three days, Disp: 70 tablet, Rfl: 11    immune globulin, human, (Gammagard) infusion, Infuse 600 mL (60 g) into a venous catheter every 14 (fourteen) days., Disp: , Rfl:     insulin glargine (Toujeo Max Solostar- 2 unit dial) 300 unit/mL (3 mL) injection, Inject 54 units under the skin every morning, Disp: 6 mL, Rfl: 6    insulin lispro (HumaLOG KwikPen Insulin) 100 unit/mL injection, Use as directed to give up to 100 units a day, Disp: 90 mL, Rfl: 3    ipratropium-albuteroL (Duo-Neb) 0.5-2.5 mg/3 mL nebulizer solution, Inhale 3 mL 4 times a day as needed., Disp: , Rfl:     lacosamide (Vimpat) 150 mg tablet tablet, Take 2 tablets (300 mg) by mouth 2 times a day., Disp: 120 tablet, Rfl: 1    lasmiditan (Reyvow) 100 mg tablet, Take 1 tablet (100 mg) by mouth if needed for migraine. Do not drive in 8 hours of taking this  "medicine. Maximum 1 dose per 24 hours., Disp: 9 tablet, Rfl: 5    levETIRAcetam (Keppra) 750 mg tablet, Take 2 tablets (1,500 mg) by mouth 2 times a day., Disp: 120 tablet, Rfl: 1    levothyroxine (Synthroid, Levoxyl) 50 mcg tablet, Take 1.5 tablets (75 mcg) by mouth once daily in the morning. Take before meals., Disp: 45 tablet, Rfl: 11    methylPREDNISolone (Medrol) 4 mg tablet, 8 tabs daily 5 days , 7 tabs daily 5 days , 6 tabs daily 5 days , 5 tabs daily 5 days , 4 tabs daily 5 days , 3 tablets daily 5 days 2 tabs daily 5 days , 1 tab daily 5 days per [facility]., Disp: , Rfl:     miconazole (Micotin) 2 % cream, Apply 1 Application topically once daily as needed (rash)., Disp: , Rfl:     miconazole (Micotin) 2 % powder, Apply to groin , under the breast and skin folds daily, Disp: , Rfl:     moisturizing mouth (Biotene Oral Dry Mouth) solution, Take 1 spray by mouth 4 times a day as needed., Disp: , Rfl:     montelukast (Singulair) 10 mg tablet, TAKE ONE TABLET BY MOUTH EVERY DAY AT BEDTIME, Disp: 90 tablet, Rfl: 0    Motegrity 2 mg tablet, Take 1 tablet (2 mg) by mouth once daily., Disp: , Rfl:     moxifloxacin (Avelox) 400 mg tablet, Take 1 tablet (400 mg) by mouth early in the morning.., Disp: , Rfl:     nasal spray Nayzilam 5 mg/spray (0.1 mL) spray,non-aerosol, Administer into affected nostril(s) 1 time., Disp: , Rfl:     ondansetron ODT (Zofran-ODT) 8 mg disintegrating tablet, Dissolve 1 tablet (8 mg) in the mouth every 8 hours if needed for nausea or vomiting., Disp: 20 tablet, Rfl: 0    OneTouch Delica Plus Lancet 33 gauge misc, USE 1 LANCET TO CHECK GLUCOSE ONCE DAILY AS DIRECTED, Disp: , Rfl:     OneTouch Verio test strips strip, USE 1 STRIP TO CHECK GLUCOSE ONCE DAILY AS DIRECTED, Disp: , Rfl:     pantoprazole (Protonix) 40 mg EC tablet, Take 1 tablet (40 mg) by mouth 2 times a day before meals., Disp: , Rfl:     pen needle, diabetic (BD Lela 2nd Gen Pen Needle) 32 gauge x 5/32\" " needle, Use as directed with insulin pen up to 5 times daily, Disp: 500 each, Rfl: 2    polyethylene glycol (Glycolax, Miralax) 17 gram/dose powder, Mix 17 g of powder and drink 3 times a day as needed for constipation., Disp: , Rfl:     propranolol LA (Inderal LA) 60 mg 24 hr capsule, Take 1 capsule (60 mg) by mouth once daily. Do not crush, chew, or split., Disp: 30 capsule, Rfl: 11    rOPINIRole (Requip) 0.5 mg tablet, Take 1 tablet by mouth (0.5mg) in the morning and 2 tablets (1mg) by mouth in the evening, Disp: , Rfl:     senna 8.6 mg tablet, Take 1 tablet (8.6 mg) by mouth once daily at bedtime., Disp: , Rfl:     tiZANidine (Zanaflex) 2 mg tablet, Take 1 tablet (2 mg) by mouth 2 times a day., Disp: , Rfl:     ubrogepant (Ubrelvy) 100 mg tablet tablet, Take 1 tablet (100 mg) by mouth if needed (migraine). May repeat in 2 hours for max of 200mg per 24 hours., Disp: 16 tablet, Rfl: 3    ZINC ORAL, Take 50 mg by mouth once daily., Disp: , Rfl:       Objective   Neurological Exam    On physical examination, the patient is alert and interactive.  Her incision is clean and intact.  We removed her sutures today.  She moves all extremities with good strength.  Interrogation of the shunt demonstrates that it is still at setting #3.    The patient continues to recover from her surgery though she has a recurrence of her headaches.  Shunt workup has not demonstrated any evidence of shunt blockage.  To see if further drainage will help her, I have opened the shunt to setting #2.  I do not think we should open it any further.  I counseled the patient on the importance of weight loss to both her idiopathic intracranial hypertension and her low back pain.

## 2025-02-27 ENCOUNTER — PATIENT MESSAGE (OUTPATIENT)
Dept: PRIMARY CARE | Facility: CLINIC | Age: 50
End: 2025-02-27
Payer: COMMERCIAL

## 2025-02-27 DIAGNOSIS — I10 BENIGN ESSENTIAL HYPERTENSION: Primary | ICD-10-CM

## 2025-02-27 RX ORDER — LOSARTAN POTASSIUM 25 MG/1
25 TABLET ORAL DAILY
Qty: 100 TABLET | Refills: 3 | Status: SHIPPED | OUTPATIENT
Start: 2025-02-27 | End: 2026-04-03

## 2025-02-28 DIAGNOSIS — E04.1 THYROID NODULE: ICD-10-CM

## 2025-03-03 ENCOUNTER — APPOINTMENT (OUTPATIENT)
Dept: OPHTHALMOLOGY | Facility: CLINIC | Age: 50
End: 2025-03-03
Payer: COMMERCIAL

## 2025-03-03 RX ORDER — LEVOTHYROXINE SODIUM 50 UG/1
TABLET ORAL
Qty: 45 TABLET | Refills: 1 | Status: SHIPPED | OUTPATIENT
Start: 2025-03-03

## 2025-03-04 ENCOUNTER — TELEPHONE (OUTPATIENT)
Dept: NEUROSURGERY | Facility: HOSPITAL | Age: 50
End: 2025-03-04
Payer: MEDICAID

## 2025-03-04 NOTE — TELEPHONE ENCOUNTER
Pt came in to office and had called feeling worse after adjustment to #2. I discussed with Dr. Tafoya and changed her back to #3.

## 2025-03-05 LAB
ALBUMIN SERPL-MCNC: 3.9 G/DL (ref 3.6–5.1)
ALBUMIN/CREAT UR: 117 MG/G CREAT
ALP SERPL-CCNC: 80 U/L (ref 31–125)
ALT SERPL-CCNC: 24 U/L (ref 6–29)
ANION GAP SERPL CALCULATED.4IONS-SCNC: 7 MMOL/L (CALC) (ref 7–17)
AST SERPL-CCNC: 13 U/L (ref 10–35)
BILIRUB SERPL-MCNC: 0.3 MG/DL (ref 0.2–1.2)
BUN SERPL-MCNC: 23 MG/DL (ref 7–25)
CALCIUM SERPL-MCNC: 9 MG/DL (ref 8.6–10.2)
CHLORIDE SERPL-SCNC: 102 MMOL/L (ref 98–110)
CHOLEST SERPL-MCNC: 226 MG/DL
CHOLEST/HDLC SERPL: 3.8 (CALC)
CO2 SERPL-SCNC: 32 MMOL/L (ref 20–32)
CREAT SERPL-MCNC: 0.79 MG/DL (ref 0.5–0.99)
CREAT UR-MCNC: 83 MG/DL (ref 20–275)
EGFRCR SERPLBLD CKD-EPI 2021: 93 ML/MIN/1.73M2
EST. AVERAGE GLUCOSE BLD GHB EST-MCNC: 203 MG/DL
EST. AVERAGE GLUCOSE BLD GHB EST-SCNC: 11.2 MMOL/L
GLUCOSE SERPL-MCNC: 150 MG/DL (ref 65–99)
HBA1C MFR BLD: 8.7 % OF TOTAL HGB
HDLC SERPL-MCNC: 60 MG/DL
LDLC SERPL CALC-MCNC: 111 MG/DL (CALC)
MICROALBUMIN UR-MCNC: 9.7 MG/DL
NONHDLC SERPL-MCNC: 166 MG/DL (CALC)
POTASSIUM SERPL-SCNC: 4.1 MMOL/L (ref 3.5–5.3)
PROT SERPL-MCNC: 7.4 G/DL (ref 6.1–8.1)
SODIUM SERPL-SCNC: 141 MMOL/L (ref 135–146)
TRIGL SERPL-MCNC: 385 MG/DL

## 2025-03-07 DIAGNOSIS — J30.2 PERENNIAL ALLERGIC RHINITIS WITH SEASONAL VARIATION: ICD-10-CM

## 2025-03-07 DIAGNOSIS — J30.89 PERENNIAL ALLERGIC RHINITIS WITH SEASONAL VARIATION: ICD-10-CM

## 2025-03-08 ENCOUNTER — HOSPITAL ENCOUNTER (OUTPATIENT)
Facility: HOSPITAL | Age: 50
Setting detail: OBSERVATION
Discharge: HOME | End: 2025-03-10
Attending: EMERGENCY MEDICINE
Payer: MEDICAID

## 2025-03-08 ENCOUNTER — CLINICAL SUPPORT (OUTPATIENT)
Dept: EMERGENCY MEDICINE | Facility: HOSPITAL | Age: 50
End: 2025-03-08
Payer: MEDICAID

## 2025-03-08 ENCOUNTER — APPOINTMENT (OUTPATIENT)
Dept: RADIOLOGY | Facility: HOSPITAL | Age: 50
End: 2025-03-08
Payer: MEDICAID

## 2025-03-08 DIAGNOSIS — J45.50 SEVERE PERSISTENT ASTHMA WITHOUT COMPLICATION (MULTI): ICD-10-CM

## 2025-03-08 DIAGNOSIS — R51.9 ACUTE INTRACTABLE HEADACHE, UNSPECIFIED HEADACHE TYPE: Primary | ICD-10-CM

## 2025-03-08 LAB
ACE SERPL-CCNC: 22 U/L (ref 9–67)
ALBUMIN SERPL BCP-MCNC: 3.3 G/DL (ref 3.4–5)
ALP SERPL-CCNC: 70 U/L (ref 33–110)
ALT SERPL W P-5'-P-CCNC: 27 U/L (ref 7–45)
ANION GAP SERPL CALC-SCNC: 12 MMOL/L (ref 10–20)
AST SERPL W P-5'-P-CCNC: 19 U/L (ref 9–39)
B BURGDOR IGG+IGM SER QL IA: <=0.9 INDEX
B HENSELAE IGG SER QL: NEGATIVE
B HENSELAE IGM SER QL: NEGATIVE
BASOPHILS # BLD AUTO: 0.02 X10*3/UL (ref 0–0.1)
BASOPHILS NFR BLD AUTO: 0.4 %
BILIRUB SERPL-MCNC: 0.2 MG/DL (ref 0–1.2)
BUN SERPL-MCNC: 19 MG/DL (ref 6–23)
CALCIUM SERPL-MCNC: 7.8 MG/DL (ref 8.6–10.6)
CHLORIDE SERPL-SCNC: 109 MMOL/L (ref 98–107)
CO2 SERPL-SCNC: 25 MMOL/L (ref 21–32)
CREAT SERPL-MCNC: 0.68 MG/DL (ref 0.5–1.05)
EGFRCR SERPLBLD CKD-EPI 2021: >90 ML/MIN/1.73M*2
EOSINOPHIL # BLD AUTO: 0.03 X10*3/UL (ref 0–0.7)
EOSINOPHIL NFR BLD AUTO: 0.6 %
ERYTHROCYTE [DISTWIDTH] IN BLOOD BY AUTOMATED COUNT: 16.5 % (ref 11–15)
ERYTHROCYTE [DISTWIDTH] IN BLOOD BY AUTOMATED COUNT: 17.4 % (ref 11.5–14.5)
FOLATE SERPL-MCNC: 20.8 NG/ML
GLUCOSE SERPL-MCNC: 123 MG/DL (ref 74–99)
HCT VFR BLD AUTO: 32.1 % (ref 36–46)
HCT VFR BLD AUTO: 38.1 % (ref 35–45)
HGB BLD-MCNC: 10.4 G/DL (ref 12–16)
HGB BLD-MCNC: 11.9 G/DL (ref 11.7–15.5)
IMM GRANULOCYTES # BLD AUTO: 0.02 X10*3/UL (ref 0–0.7)
IMM GRANULOCYTES NFR BLD AUTO: 0.4 % (ref 0–0.9)
LIPASE SERPL-CCNC: 40 U/L (ref 9–82)
LYMPHOCYTES # BLD AUTO: 1.76 X10*3/UL (ref 1.2–4.8)
LYMPHOCYTES NFR BLD AUTO: 34.5 %
M TB IFN-G BLD-IMP: NORMAL
MCH RBC QN AUTO: 26.7 PG (ref 27–33)
MCH RBC QN AUTO: 27.2 PG (ref 26–34)
MCHC RBC AUTO-ENTMCNC: 31.2 G/DL (ref 32–36)
MCHC RBC AUTO-ENTMCNC: 32.4 G/DL (ref 32–36)
MCV RBC AUTO: 84 FL (ref 80–100)
MCV RBC AUTO: 85.4 FL (ref 80–100)
MONOCYTES # BLD AUTO: 0.27 X10*3/UL (ref 0.1–1)
MONOCYTES NFR BLD AUTO: 5.3 %
NEUTROPHILS # BLD AUTO: 3 X10*3/UL (ref 1.2–7.7)
NEUTROPHILS NFR BLD AUTO: 58.8 %
NRBC BLD-RTO: 0 /100 WBCS (ref 0–0)
PLATELET # BLD AUTO: 244 X10*3/UL (ref 150–450)
PLATELET # BLD AUTO: 265 THOUSAND/UL (ref 140–400)
PMV BLD REES-ECKER: 10.7 FL (ref 7.5–12.5)
POTASSIUM SERPL-SCNC: 3.2 MMOL/L (ref 3.5–5.3)
PROT SERPL-MCNC: 6.3 G/DL (ref 6.4–8.2)
RBC # BLD AUTO: 3.82 X10*6/UL (ref 4–5.2)
RBC # BLD AUTO: 4.46 MILLION/UL (ref 3.8–5.1)
SODIUM SERPL-SCNC: 143 MMOL/L (ref 136–145)
T GONDII IGG SERPL IA-ACNC: <7.2 IU/ML
T GONDII IGM SERPL QL IA: <8 AU/ML
T PALLIDUM AB SER QL IA: NEGATIVE
VIT A SERPL-MCNC: 105 MCG/DL (ref 38–98)
VIT B1 BLD-SCNC: NORMAL NMOL/L
VIT B12 SERPL-MCNC: 328 PG/ML (ref 200–1100)
WBC # BLD AUTO: 5.1 X10*3/UL (ref 4.4–11.3)
WBC # BLD AUTO: 5.9 THOUSAND/UL (ref 3.8–10.8)

## 2025-03-08 PROCEDURE — 96374 THER/PROPH/DIAG INJ IV PUSH: CPT

## 2025-03-08 PROCEDURE — 99285 EMERGENCY DEPT VISIT HI MDM: CPT | Mod: 25 | Performed by: EMERGENCY MEDICINE

## 2025-03-08 PROCEDURE — 2500000004 HC RX 250 GENERAL PHARMACY W/ HCPCS (ALT 636 FOR OP/ED): Mod: SE

## 2025-03-08 PROCEDURE — 70450 CT HEAD/BRAIN W/O DYE: CPT

## 2025-03-08 PROCEDURE — 93005 ELECTROCARDIOGRAM TRACING: CPT

## 2025-03-08 PROCEDURE — 2500000002 HC RX 250 W HCPCS SELF ADMINISTERED DRUGS (ALT 637 FOR MEDICARE OP, ALT 636 FOR OP/ED): Mod: SE

## 2025-03-08 PROCEDURE — 85025 COMPLETE CBC W/AUTO DIFF WBC: CPT

## 2025-03-08 PROCEDURE — 94640 AIRWAY INHALATION TREATMENT: CPT

## 2025-03-08 PROCEDURE — 80053 COMPREHEN METABOLIC PANEL: CPT

## 2025-03-08 PROCEDURE — 71045 X-RAY EXAM CHEST 1 VIEW: CPT

## 2025-03-08 PROCEDURE — 2500000001 HC RX 250 WO HCPCS SELF ADMINISTERED DRUGS (ALT 637 FOR MEDICARE OP): Mod: SE

## 2025-03-08 PROCEDURE — 83690 ASSAY OF LIPASE: CPT

## 2025-03-08 RX ORDER — POTASSIUM CHLORIDE 20 MEQ/1
40 TABLET, EXTENDED RELEASE ORAL ONCE
Status: COMPLETED | OUTPATIENT
Start: 2025-03-08 | End: 2025-03-08

## 2025-03-08 RX ORDER — METOCLOPRAMIDE HYDROCHLORIDE 5 MG/ML
10 INJECTION INTRAMUSCULAR; INTRAVENOUS ONCE
Status: COMPLETED | OUTPATIENT
Start: 2025-03-09 | End: 2025-03-09

## 2025-03-08 RX ORDER — ACETAMINOPHEN 325 MG/1
650 TABLET ORAL ONCE
Status: COMPLETED | OUTPATIENT
Start: 2025-03-08 | End: 2025-03-08

## 2025-03-08 RX ORDER — IPRATROPIUM BROMIDE AND ALBUTEROL SULFATE 2.5; .5 MG/3ML; MG/3ML
3 SOLUTION RESPIRATORY (INHALATION) ONCE
Status: COMPLETED | OUTPATIENT
Start: 2025-03-08 | End: 2025-03-08

## 2025-03-08 RX ORDER — FENTANYL CITRATE 50 UG/ML
50 INJECTION, SOLUTION INTRAMUSCULAR; INTRAVENOUS ONCE
Status: DISCONTINUED | OUTPATIENT
Start: 2025-03-08 | End: 2025-03-08

## 2025-03-08 RX ORDER — IPRATROPIUM BROMIDE AND ALBUTEROL SULFATE 2.5; .5 MG/3ML; MG/3ML
3 SOLUTION RESPIRATORY (INHALATION)
Status: DISCONTINUED | OUTPATIENT
Start: 2025-03-09 | End: 2025-03-08

## 2025-03-08 RX ORDER — ONDANSETRON HYDROCHLORIDE 2 MG/ML
4 INJECTION, SOLUTION INTRAVENOUS ONCE
Status: COMPLETED | OUTPATIENT
Start: 2025-03-08 | End: 2025-03-08

## 2025-03-08 RX ADMIN — IPRATROPIUM BROMIDE AND ALBUTEROL SULFATE 3 ML: .5; 3 SOLUTION RESPIRATORY (INHALATION) at 22:32

## 2025-03-08 RX ADMIN — POTASSIUM CHLORIDE 40 MEQ: 1500 TABLET, EXTENDED RELEASE ORAL at 22:32

## 2025-03-08 RX ADMIN — ONDANSETRON 4 MG: 2 INJECTION INTRAMUSCULAR; INTRAVENOUS at 21:27

## 2025-03-08 RX ADMIN — ACETAMINOPHEN 650 MG: 325 TABLET ORAL at 21:27

## 2025-03-08 ASSESSMENT — PAIN DESCRIPTION - PROGRESSION: CLINICAL_PROGRESSION: OTHER (COMMENT)

## 2025-03-08 ASSESSMENT — PAIN SCALES - GENERAL
PAINLEVEL_OUTOF10: 5 - MODERATE PAIN
PAINLEVEL_OUTOF10: 7
PAINLEVEL_OUTOF10: 8

## 2025-03-08 ASSESSMENT — PAIN DESCRIPTION - LOCATION: LOCATION: HEAD

## 2025-03-08 ASSESSMENT — PAIN - FUNCTIONAL ASSESSMENT
PAIN_FUNCTIONAL_ASSESSMENT: 0-10
PAIN_FUNCTIONAL_ASSESSMENT: 0-10

## 2025-03-08 ASSESSMENT — PAIN DESCRIPTION - PAIN TYPE: TYPE: ACUTE PAIN

## 2025-03-08 ASSESSMENT — PAIN DESCRIPTION - DESCRIPTORS: DESCRIPTORS: ACHING

## 2025-03-08 ASSESSMENT — PAIN DESCRIPTION - FREQUENCY: FREQUENCY: CONSTANT/CONTINUOUS

## 2025-03-08 NOTE — ED TRIAGE NOTES
Patient states she is having pressure in her head, that started today.  She states that she went to pick something up off the floor and she bumped her head right where the shunt is placed.  She states that she started having pressure in her head after that event.  She is also having intermittant dizziness as a result of the event as well.  She has the AV shunt of idiopathic increase intracranial pressure.  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  Denies any chest pain, but she does endorse having some shortness of breath that she feels is more secondary to her asthma.  She states being diagnosed with a UTI yesterday and was started on an ATB yesterday as well.  Pmh of seizure, but is compliant with all meds, and last focal seizure was approx 1 week ago.

## 2025-03-09 PROBLEM — R51.9 HEADACHE, UNSPECIFIED: Status: ACTIVE | Noted: 2025-03-09

## 2025-03-09 LAB
GLUCOSE BLD MANUAL STRIP-MCNC: 143 MG/DL (ref 74–99)
GLUCOSE BLD MANUAL STRIP-MCNC: 204 MG/DL (ref 74–99)
GLUCOSE BLD MANUAL STRIP-MCNC: 232 MG/DL (ref 74–99)
GLUCOSE BLD MANUAL STRIP-MCNC: 249 MG/DL (ref 74–99)
GLUCOSE BLD MANUAL STRIP-MCNC: 302 MG/DL (ref 74–99)
MAGNESIUM SERPL-MCNC: 2.18 MG/DL (ref 1.6–2.4)

## 2025-03-09 PROCEDURE — 2500000005 HC RX 250 GENERAL PHARMACY W/O HCPCS: Mod: SE

## 2025-03-09 PROCEDURE — 82947 ASSAY GLUCOSE BLOOD QUANT: CPT

## 2025-03-09 PROCEDURE — 2500000004 HC RX 250 GENERAL PHARMACY W/ HCPCS (ALT 636 FOR OP/ED): Mod: SE

## 2025-03-09 PROCEDURE — G0378 HOSPITAL OBSERVATION PER HR: HCPCS

## 2025-03-09 PROCEDURE — 83735 ASSAY OF MAGNESIUM: CPT

## 2025-03-09 PROCEDURE — 2500000002 HC RX 250 W HCPCS SELF ADMINISTERED DRUGS (ALT 637 FOR MEDICARE OP, ALT 636 FOR OP/ED): Mod: SE | Performed by: NURSE PRACTITIONER

## 2025-03-09 PROCEDURE — 96375 TX/PRO/DX INJ NEW DRUG ADDON: CPT

## 2025-03-09 PROCEDURE — 2500000002 HC RX 250 W HCPCS SELF ADMINISTERED DRUGS (ALT 637 FOR MEDICARE OP, ALT 636 FOR OP/ED): Mod: SE

## 2025-03-09 PROCEDURE — 96376 TX/PRO/DX INJ SAME DRUG ADON: CPT

## 2025-03-09 PROCEDURE — 2500000001 HC RX 250 WO HCPCS SELF ADMINISTERED DRUGS (ALT 637 FOR MEDICARE OP): Mod: SE

## 2025-03-09 PROCEDURE — 2500000004 HC RX 250 GENERAL PHARMACY W/ HCPCS (ALT 636 FOR OP/ED): Mod: SE | Performed by: NURSE PRACTITIONER

## 2025-03-09 PROCEDURE — 96361 HYDRATE IV INFUSION ADD-ON: CPT

## 2025-03-09 RX ORDER — DIVALPROEX SODIUM 500 MG/1
500 TABLET, FILM COATED, EXTENDED RELEASE ORAL 2 TIMES DAILY
Status: DISCONTINUED | OUTPATIENT
Start: 2025-03-09 | End: 2025-03-10 | Stop reason: HOSPADM

## 2025-03-09 RX ORDER — DIPHENHYDRAMINE HYDROCHLORIDE 50 MG/ML
INJECTION INTRAMUSCULAR; INTRAVENOUS
Status: DISPENSED
Start: 2025-03-09 | End: 2025-03-09

## 2025-03-09 RX ORDER — PROPRANOLOL HYDROCHLORIDE 60 MG/1
60 CAPSULE, EXTENDED RELEASE ORAL DAILY
Status: DISCONTINUED | OUTPATIENT
Start: 2025-03-09 | End: 2025-03-10 | Stop reason: HOSPADM

## 2025-03-09 RX ORDER — DEXTROSE 50 % IN WATER (D50W) INTRAVENOUS SYRINGE
25
Status: DISCONTINUED | OUTPATIENT
Start: 2025-03-09 | End: 2025-03-10 | Stop reason: HOSPADM

## 2025-03-09 RX ORDER — ACETAMINOPHEN 325 MG/1
650 TABLET ORAL ONCE
Status: DISCONTINUED | OUTPATIENT
Start: 2025-03-09 | End: 2025-03-09

## 2025-03-09 RX ORDER — CIPROFLOXACIN 500 MG/1
500 TABLET ORAL EVERY 12 HOURS SCHEDULED
Status: DISCONTINUED | OUTPATIENT
Start: 2025-03-09 | End: 2025-03-10 | Stop reason: HOSPADM

## 2025-03-09 RX ORDER — AMITRIPTYLINE HYDROCHLORIDE 100 MG/1
100 TABLET ORAL NIGHTLY
Status: DISCONTINUED | OUTPATIENT
Start: 2025-03-09 | End: 2025-03-10 | Stop reason: HOSPADM

## 2025-03-09 RX ORDER — DIPHENHYDRAMINE HYDROCHLORIDE 50 MG/ML
25 INJECTION INTRAMUSCULAR; INTRAVENOUS ONCE
Status: COMPLETED | OUTPATIENT
Start: 2025-03-09 | End: 2025-03-09

## 2025-03-09 RX ORDER — POLYETHYLENE GLYCOL 3350 17 G/17G
17 POWDER, FOR SOLUTION ORAL 3 TIMES DAILY PRN
Status: DISCONTINUED | OUTPATIENT
Start: 2025-03-09 | End: 2025-03-10 | Stop reason: HOSPADM

## 2025-03-09 RX ORDER — METOCLOPRAMIDE HYDROCHLORIDE 5 MG/ML
10 INJECTION INTRAMUSCULAR; INTRAVENOUS ONCE
Status: COMPLETED | OUTPATIENT
Start: 2025-03-09 | End: 2025-03-09

## 2025-03-09 RX ORDER — ACETAMINOPHEN 325 MG/1
975 TABLET ORAL ONCE
Status: COMPLETED | OUTPATIENT
Start: 2025-03-09 | End: 2025-03-09

## 2025-03-09 RX ORDER — FUROSEMIDE 20 MG/1
20 TABLET ORAL 2 TIMES DAILY
Status: DISCONTINUED | OUTPATIENT
Start: 2025-03-09 | End: 2025-03-10 | Stop reason: HOSPADM

## 2025-03-09 RX ORDER — IPRATROPIUM BROMIDE AND ALBUTEROL SULFATE 2.5; .5 MG/3ML; MG/3ML
3 SOLUTION RESPIRATORY (INHALATION) 4 TIMES DAILY PRN
Status: DISCONTINUED | OUTPATIENT
Start: 2025-03-09 | End: 2025-03-10 | Stop reason: HOSPADM

## 2025-03-09 RX ORDER — HYDROCORTISONE 10 MG/1
10 TABLET ORAL 2 TIMES DAILY
Status: DISCONTINUED | OUTPATIENT
Start: 2025-03-09 | End: 2025-03-10 | Stop reason: HOSPADM

## 2025-03-09 RX ORDER — TIZANIDINE 2 MG/1
2 TABLET ORAL 2 TIMES DAILY
Status: DISCONTINUED | OUTPATIENT
Start: 2025-03-09 | End: 2025-03-10 | Stop reason: HOSPADM

## 2025-03-09 RX ORDER — IPRATROPIUM BROMIDE AND ALBUTEROL SULFATE 2.5; .5 MG/3ML; MG/3ML
3 SOLUTION RESPIRATORY (INHALATION) ONCE
Status: COMPLETED | OUTPATIENT
Start: 2025-03-09 | End: 2025-03-09

## 2025-03-09 RX ORDER — KETOROLAC TROMETHAMINE 15 MG/ML
15 INJECTION, SOLUTION INTRAMUSCULAR; INTRAVENOUS ONCE
Status: COMPLETED | OUTPATIENT
Start: 2025-03-09 | End: 2025-03-09

## 2025-03-09 RX ORDER — LACOSAMIDE 50 MG/1
300 TABLET ORAL 2 TIMES DAILY
Status: DISCONTINUED | OUTPATIENT
Start: 2025-03-09 | End: 2025-03-10 | Stop reason: HOSPADM

## 2025-03-09 RX ORDER — PANTOPRAZOLE SODIUM 40 MG/1
40 TABLET, DELAYED RELEASE ORAL
Status: DISCONTINUED | OUTPATIENT
Start: 2025-03-09 | End: 2025-03-10 | Stop reason: HOSPADM

## 2025-03-09 RX ORDER — LEVOTHYROXINE SODIUM 75 UG/1
75 TABLET ORAL DAILY
Status: DISCONTINUED | OUTPATIENT
Start: 2025-03-09 | End: 2025-03-10 | Stop reason: HOSPADM

## 2025-03-09 RX ORDER — ONDANSETRON 4 MG/1
8 TABLET, ORALLY DISINTEGRATING ORAL EVERY 8 HOURS PRN
Status: DISCONTINUED | OUTPATIENT
Start: 2025-03-09 | End: 2025-03-10 | Stop reason: HOSPADM

## 2025-03-09 RX ORDER — DEXTROSE 50 % IN WATER (D50W) INTRAVENOUS SYRINGE
12.5
Status: DISCONTINUED | OUTPATIENT
Start: 2025-03-09 | End: 2025-03-10 | Stop reason: HOSPADM

## 2025-03-09 RX ORDER — CLONAZEPAM 0.5 MG/1
0.5 TABLET ORAL 2 TIMES DAILY
Status: DISCONTINUED | OUTPATIENT
Start: 2025-03-09 | End: 2025-03-10 | Stop reason: HOSPADM

## 2025-03-09 RX ORDER — EZETIMIBE 10 MG/1
10 TABLET ORAL DAILY
Status: DISCONTINUED | OUTPATIENT
Start: 2025-03-09 | End: 2025-03-10 | Stop reason: HOSPADM

## 2025-03-09 RX ORDER — LEVETIRACETAM 500 MG/1
1500 TABLET ORAL 2 TIMES DAILY
Status: DISCONTINUED | OUTPATIENT
Start: 2025-03-09 | End: 2025-03-10 | Stop reason: HOSPADM

## 2025-03-09 RX ORDER — INSULIN LISPRO 100 [IU]/ML
0-10 INJECTION, SOLUTION INTRAVENOUS; SUBCUTANEOUS EVERY 4 HOURS
Status: DISCONTINUED | OUTPATIENT
Start: 2025-03-09 | End: 2025-03-10 | Stop reason: HOSPADM

## 2025-03-09 RX ORDER — MONTELUKAST SODIUM 10 MG/1
10 TABLET ORAL NIGHTLY
Status: DISCONTINUED | OUTPATIENT
Start: 2025-03-09 | End: 2025-03-10 | Stop reason: HOSPADM

## 2025-03-09 RX ORDER — LOSARTAN POTASSIUM 25 MG/1
25 TABLET ORAL DAILY
Status: DISCONTINUED | OUTPATIENT
Start: 2025-03-09 | End: 2025-03-10 | Stop reason: HOSPADM

## 2025-03-09 RX ORDER — SODIUM CHLORIDE, SODIUM LACTATE, POTASSIUM CHLORIDE, CALCIUM CHLORIDE 600; 310; 30; 20 MG/100ML; MG/100ML; MG/100ML; MG/100ML
125 INJECTION, SOLUTION INTRAVENOUS CONTINUOUS
Status: DISCONTINUED | OUTPATIENT
Start: 2025-03-09 | End: 2025-03-10 | Stop reason: HOSPADM

## 2025-03-09 RX ADMIN — TIZANIDINE 2 MG: 2 TABLET ORAL at 20:14

## 2025-03-09 RX ADMIN — TIZANIDINE 2 MG: 2 TABLET ORAL at 08:02

## 2025-03-09 RX ADMIN — SODIUM CHLORIDE, POTASSIUM CHLORIDE, SODIUM LACTATE AND CALCIUM CHLORIDE 125 ML/HR: 600; 310; 30; 20 INJECTION, SOLUTION INTRAVENOUS at 15:59

## 2025-03-09 RX ADMIN — INSULIN LISPRO 4 UNITS: 100 INJECTION, SOLUTION INTRAVENOUS; SUBCUTANEOUS at 16:10

## 2025-03-09 RX ADMIN — CARBOXYMETHYLCELLULOSE SODIUM 2 DROP: 5 SOLUTION/ DROPS OPHTHALMIC at 08:05

## 2025-03-09 RX ADMIN — INSULIN LISPRO 4 UNITS: 100 INJECTION, SOLUTION INTRAVENOUS; SUBCUTANEOUS at 11:58

## 2025-03-09 RX ADMIN — INSULIN LISPRO 4 UNITS: 100 INJECTION, SOLUTION INTRAVENOUS; SUBCUTANEOUS at 23:50

## 2025-03-09 RX ADMIN — ACETAMINOPHEN 975 MG: 325 TABLET, FILM COATED ORAL at 06:01

## 2025-03-09 RX ADMIN — DIVALPROEX SODIUM 500 MG: 500 TABLET, FILM COATED, EXTENDED RELEASE ORAL at 08:02

## 2025-03-09 RX ADMIN — LEVETIRACETAM 1500 MG: 500 TABLET, FILM COATED ORAL at 08:03

## 2025-03-09 RX ADMIN — METOCLOPRAMIDE 10 MG: 5 INJECTION, SOLUTION INTRAMUSCULAR; INTRAVENOUS at 00:02

## 2025-03-09 RX ADMIN — LEVETIRACETAM 1500 MG: 500 TABLET, FILM COATED ORAL at 20:13

## 2025-03-09 RX ADMIN — EZETIMIBE 10 MG: 10 TABLET ORAL at 08:04

## 2025-03-09 RX ADMIN — IPRATROPIUM BROMIDE AND ALBUTEROL SULFATE 3 ML: .5; 3 SOLUTION RESPIRATORY (INHALATION) at 20:11

## 2025-03-09 RX ADMIN — CLONAZEPAM 0.5 MG: 0.5 TABLET ORAL at 08:04

## 2025-03-09 RX ADMIN — PROPRANOLOL HYDROCHLORIDE 60 MG: 60 CAPSULE, EXTENDED RELEASE ORAL at 08:02

## 2025-03-09 RX ADMIN — DIVALPROEX SODIUM 500 MG: 500 TABLET, FILM COATED, EXTENDED RELEASE ORAL at 20:14

## 2025-03-09 RX ADMIN — CLONAZEPAM 0.5 MG: 0.5 TABLET ORAL at 20:13

## 2025-03-09 RX ADMIN — FUROSEMIDE 20 MG: 20 TABLET ORAL at 08:03

## 2025-03-09 RX ADMIN — INSULIN LISPRO 8 UNITS: 100 INJECTION, SOLUTION INTRAVENOUS; SUBCUTANEOUS at 20:08

## 2025-03-09 RX ADMIN — KETOROLAC TROMETHAMINE 15 MG: 15 INJECTION, SOLUTION INTRAMUSCULAR; INTRAVENOUS at 15:46

## 2025-03-09 RX ADMIN — METOCLOPRAMIDE 10 MG: 5 INJECTION, SOLUTION INTRAMUSCULAR; INTRAVENOUS at 15:45

## 2025-03-09 RX ADMIN — LACOSAMIDE 300 MG: 50 TABLET, FILM COATED ORAL at 20:13

## 2025-03-09 RX ADMIN — SODIUM CHLORIDE 1000 ML: 0.9 INJECTION, SOLUTION INTRAVENOUS at 06:02

## 2025-03-09 RX ADMIN — IPRATROPIUM BROMIDE AND ALBUTEROL SULFATE 3 ML: .5; 3 SOLUTION RESPIRATORY (INHALATION) at 06:01

## 2025-03-09 RX ADMIN — ONDANSETRON 8 MG: 4 TABLET, ORALLY DISINTEGRATING ORAL at 13:21

## 2025-03-09 RX ADMIN — LEVOTHYROXINE SODIUM 75 MCG: 75 TABLET ORAL at 06:02

## 2025-03-09 RX ADMIN — HYDROCORTISONE 10 MG: 10 TABLET ORAL at 08:02

## 2025-03-09 RX ADMIN — PANTOPRAZOLE SODIUM 40 MG: 40 TABLET, DELAYED RELEASE ORAL at 06:02

## 2025-03-09 RX ADMIN — LOSARTAN POTASSIUM 25 MG: 25 TABLET, FILM COATED ORAL at 08:03

## 2025-03-09 RX ADMIN — FUROSEMIDE 20 MG: 20 TABLET ORAL at 20:14

## 2025-03-09 RX ADMIN — CARBOXYMETHYLCELLULOSE SODIUM 2 DROP: 5 SOLUTION/ DROPS OPHTHALMIC at 14:07

## 2025-03-09 RX ADMIN — CARBOXYMETHYLCELLULOSE SODIUM 2 DROP: 5 SOLUTION/ DROPS OPHTHALMIC at 20:14

## 2025-03-09 RX ADMIN — PANTOPRAZOLE SODIUM 40 MG: 40 TABLET, DELAYED RELEASE ORAL at 15:49

## 2025-03-09 RX ADMIN — LACOSAMIDE 300 MG: 50 TABLET, FILM COATED ORAL at 08:04

## 2025-03-09 RX ADMIN — CIPROFOLXACIN 500 MG: 500 TABLET ORAL at 20:13

## 2025-03-09 RX ADMIN — DIPHENHYDRAMINE HYDROCHLORIDE 25 MG: 50 INJECTION INTRAMUSCULAR; INTRAVENOUS at 00:15

## 2025-03-09 RX ADMIN — CIPROFOLXACIN 500 MG: 500 TABLET ORAL at 08:04

## 2025-03-09 RX ADMIN — HYDROCORTISONE 10 MG: 10 TABLET ORAL at 20:13

## 2025-03-09 RX ADMIN — DIPHENHYDRAMINE HYDROCHLORIDE 25 MG: 50 INJECTION INTRAMUSCULAR; INTRAVENOUS at 15:47

## 2025-03-09 NOTE — CONSULTS
Date of Service:  3/9/2025 Attending Provider:  Deborah Ludwig MD;Ronnie Moreira DO     Reason for Consultation:  Jonathan Ayala is being seen today for a consult requested by Deborah Ludwig MD;Ronnie Moreira DO for concern for shunt.    Subjective   History of Present Illness:  Jonathan is a 49 y.o. 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 Patient reports that since then she has had worsening headache and pressure, also noting worsening vision in here right eye. She notes that the headache and discomfort that she feels is similar to the discomfort that she had prior to getting the shunt placed. She notes some recent chills and sweats, but was diagnosed with a UTI this weak, for which she has been taking antibiotics. She has had worsening nausea, which she has at baseline. She denies any new weakness, sensation changes. She is concerned that her shunt was damaged and that something is wrong.    The shunt valve was previously dialed to 3 from 2, which was helpful for her symptoms.     Review of Systems negative other than listed in HPI.    Objective   Vitals:  Vitals:    03/08/25 2140   BP: 158/90   Pulse: 88   Resp: 15   Temp:    SpO2: 97%         Exam:  Constitutional: No acute distress  Resp: breathing comfortably on room air  Neuro: Awake, Ox3  face symmetric  OU5R  RUE 5/5  LUE 5/5  RLE 5/5  LLE 5/5  sensation intact to light touch  Psych: appropriate  Skin: head incision clean, dry, intact, no fluid discharge    Medical History  Past Medical History:   Diagnosis Date    Abnormal findings on  diagnostic imaging of other abdominal regions, including retroperitoneum 10/14/2020    Abnormal CT of the abdomen    Acquired deformity of nose 03/24/2022    Nasal deformity    Acute upper respiratory infection, unspecified 10/16/2019    Acute URI    Adrenal disease (Multi)     Allergic     Allergy status to unspecified drugs, medicaments and biological substances 05/22/2020    History of drug allergy    Allergy status to unspecified drugs, medicaments and biological substances 11/13/2020    History of adverse drug reaction    Anemia     Anxiety 2005    Asthma     Benign intracranial hypertension 01/27/2022    Pseudotumor cerebri    Bipolar disorder, unspecified (Multi)     Bipolar disease, chronic    Breast calcification, right 08/21/2018    Cellulitis of abdominal wall 09/28/2022    Cellulitis of right abdominal wall    Cervicalgia 07/01/2020    Cervicalgia of [id number]-tknoqgo-bwhld region    Chronic maxillary sinusitis 01/04/2022    Chronic maxillary sinusitis    Chronic sialoadenitis 03/16/2020    Chronic sialoadenitis    COVID-19 01/06/2022    COVID-19 with multiple comorbidities    Decreased white blood cell count, unspecified 11/04/2019    Leukopenia    Disease of thyroid gland     Disturbances of salivary secretion 03/16/2020    Xerostomia    Dry eye syndrome of bilateral lacrimal glands 10/07/2022    Dry eyes, bilateral    Encounter for preprocedural cardiovascular examination 02/01/2022    Preoperative cardiovascular examination    Food additives allergy status 06/11/2020    Allergy to food dye    Fracture of nasal bones, initial encounter for closed fracture 03/03/2022    Closed fracture of nasal bone, initial encounter    GERD (gastroesophageal reflux disease) 15 years old    Granuloma of right orbit 10/07/2021    Inflammatory pseudotumor of right orbit    History of endometrial ablation 11/09/2017    Hyperlipidemia     Hypertension     Hyperthyroidism     Hypoglycemia     Hypothyroidism      Immunocompromised     Localized swelling, mass and lump, head 03/24/2022    Swollen nose    Major depressive disorder, recurrent, in full remission ([facility]) 10/07/2021    Depression, major, recurrent, in complete remission    Mammary duct ectasia of left breast 08/24/2022    Periductal mastitis of left breast    Meningitis ([facility]) 2008    Migraine     Nipple discharge 08/24/2022    Bloody discharge from left nipple    Ocular pain, right eye 10/07/2022    Pain in right eye    Optic atrophy     Other abnormal and inconclusive findings on diagnostic imaging of breast 07/06/2020    Other abnormal and inconclusive findings on diagnostic imaging of breast    Other anomalies of pupillary function 05/31/2019    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    Other chest pain 05/18/2020    Chest discomfort    Other conditions influencing health status 08/01/2022    History of cough    Other conditions influencing health status 08/03/2021    Chronic migraine    Other specified disorders of eustachian tube, left ear 11/18/2019    ETD (Eustachian tube dysfunction), left    Other specified disorders of nose and nasal sinuses 03/24/2022    Nasal dryness    Other specified disorders of nose and nasal sinuses 03/24/2022    Nasal crusting    Pelvic and perineal pain 07/06/2020    Pelvic pain    Personal history of other diseases of the circulatory system 04/07/2020    History of sinus tachycardia    Personal history of other diseases of the circulatory system 04/07/2020    History of abnormal electrocardiography    Personal history of other diseases of the circulatory system     History of Raynaud's syndrome    Personal history of other diseases of the digestive system     History of irritable bowel syndrome    Personal history of other diseases of the digestive system 03/02/2020    History of oral pain    Personal history of other diseases of the musculoskeletal system and connective tissue 01/19/2022    History of neck pain     Personal history of other diseases of the musculoskeletal system and connective tissue 03/02/2021    History of scleroderma    Personal history of other diseases of the musculoskeletal system and connective tissue 06/16/2020    History of muscle weakness    Personal history of other diseases of the nervous system and sense organs 11/18/2019    History of hearing loss    Personal history of other diseases of the nervous system and sense organs 09/21/2022    History of partial seizures    Personal history of other diseases of the respiratory system 04/14/2021    History of asthma    Personal history of other endocrine, nutritional and metabolic disease 02/17/2021    History of diabetes mellitus    Personal history of other mental and behavioral disorders 05/27/2021    History of anxiety    Personal history of other specified conditions 09/07/2022    History of nipple discharge    Personal history of other specified conditions 10/16/2019    History of headache    Personal history of other specified conditions 09/28/2022    History of lump of left breast    Personal history of other specified conditions 09/16/2021    History of persistent cough    Personal history of other specified conditions 02/01/2022    History of palpitations    Personal history of other specified conditions 03/09/2022    History of headache    Personal history of other specified conditions 02/12/2014    History of chest pain    Personal history of other specified conditions 10/27/2021    History of nausea and vomiting    Personal history of other specified conditions 10/16/2019    History of fatigue    Personal history of other specified conditions 02/26/2021    History of orthopnea    Personal history of other specified conditions 02/22/2021    History of shortness of breath    Personal history of urinary calculi     H/O renal calculi    Polycystic ovary syndrome     Postural orthostatic tachycardia syndrome (POTS)     POTS (postural  orthostatic tachycardia syndrome)    Rash and other nonspecific skin eruption 03/15/2022    Rash    Repeated falls 06/23/2021    Recurrent falls    Right lower quadrant pain 10/14/2020    Abdominal pain, RLQ (right lower quadrant)    Seizures (Multi)     Sjogren syndrome, unspecified (Multi)     History of Sjogren's disease    Sjogren's syndrome     Sleep apnea     Slow transit constipation 07/09/2020    Slow transit constipation    Subarachnoid hemorrhage, traumatic (Multi) 04/19/2023    Thyroid nodule     Traumatic subarachnoid hemorrhage with loss of consciousness of unspecified duration, subsequent encounter 03/15/2022    Subarachnoid hemorrhage following injury, with loss of consciousness, subsequent encounter    Traumatic subarachnoid hemorrhage without loss of consciousness, subsequent encounter     Subarachnoid hemorrhage following injury, no loss of consciousness, subsequent encounter    Type 2 diabetes mellitus     Unspecified disorder of refraction 10/07/2022    Refractive error    Unspecified optic neuritis 11/06/2020    Right optic neuritis    Unspecified optic neuritis 11/06/2020    Optic neuritis, right    Unspecified visual loss 09/25/2019    Vision loss    Varicella As a child    Venous insufficiency (chronic) (peripheral) 10/18/2021    Chronic venous insufficiency of lower extremity    Viral infection, unspecified 01/11/2022    Nonspecific syndrome suggestive of viral illness    Vitamin D deficiency     Vitamin D deficiency, unspecified 09/28/2022    Vitamin D deficiency       Surgical History  Past Surgical History:   Procedure Laterality Date    APPENDECTOMY  2017    BRAIN SURGERY  [place] placement to measure pressure    CARDIAC CATHETERIZATION      CHOLECYSTECTOMY      ENDOMETRIAL ABLATION      FRACTURE SURGERY  12/2023    HERNIA REPAIR Right 03/01/2024    with mesh    HYSTERECTOMY  2017    MR HEAD ANGIO WO IV CONTRAST  02/08/2021    MR HEAD ANGIO WO IV CONTRAST 2/8/2021 [facility] CLINICAL [facility]GAC[facility]      NECK ANGIO WO IV CONTRAST  02/08/2021    MR NECK ANGIO WO IV CONTRAST 2/8/2021 [facility] CLINICAL LEGAC[facility]    OTHER SURGICAL HISTORY  08/22/2019    Carpal tunnel surgery    OTHER SURGICAL HISTORY  08/22/2019    Hysterectomy    OTHER SURGICAL HISTORY  08/22/2019    Venous access port placement    OTHER SURGICAL HISTORY  08/22/2019    Cholecystectomy    OTHER SURGICAL HISTORY  08/22/2019    Appendectomy    OTHER SURGICAL HISTORY  08/22/2019    Pyloroplasty    WISDOM TOOTH EXTRACTION  2004        Medications  Current Outpatient Medications   Medication Instructions    acetaminophen (TYLENOL) 325-650 mg, Every 6 hours PRN    Advair -21 mcg/actuation inhaler INHALE TWO PUFFS BY MOUTH AS INSTRUCTED TWO TIMES A DAY.    amitriptyline (ELAVIL) 100 mg, oral, Nightly    azelastine (Astelin) 137 mcg (0.1 %) nasal spray 1 spray, 2 times daily    blood-glucose sensor (DEXCOM G7 SENSOR MISC) Use to check blood sugar continuously throughout the day as directed. Change sensor every 10 days.    calcium-vitamin D3-vitamin K (Viactiv) 650 mg-12.5 mcg-40 mcg chewable tablet 2 tablets, Daily    carboxymethylcellulose (Refresh Celluvisc) 1 % ophthalmic solution dropperette 2 drops, 3 times daily PRN    cholecalciferol (Vitamin D-3) 5,000 Units tablet 1 tablet, Daily    clonazePAM (KlonoPIN) 0.5 mg tablet 1 tablet, 2 times daily    Dexcom G7  misc Use as instructed to check blood sugars continuously throughout the day    divalproex (Depakote ER) 500 mg 24 hr tablet 1 tablet, 2 times daily    EPINEPHrine 0.3 mg/0.3 mL injection syringe INJECT INTRAMUSCULARLY ONCE AS NEEDED FOR ANAPHYLAXIS    ezetimibe (ZETIA) 10 mg, oral, Daily    fluconazole (Diflucan) 150 mg tablet 1 tablet by mouth daily spread 72 hours apart    fluticasone (Flonase) 50 mcg/actuation nasal spray USE 1 SPRAY INTO EACH NOSTRIL ONCE DAILY.    folic acid (FOLVITE) 1 mg, oral, Daily    furosemide (LASIX) 20 mg, oral, 2 times daily    gloves, latex with aloe vera misc  Large size gloves    glucagon (Baqsimi) 3 mg/actuation spray,non-aerosol USE ONE SPRAY IN THE NOSE AS NEEDED FOR LOW BLOOD SUGAR  MAY REPEAT AFTER 15 MINUTES USING A NEW DEVICE IF NO RESPONSE    glucagon (GLUCAGEN) 1 mg, subcutaneous, Once as needed    hydrocortisone (CORTEF) 10 mg, oral, 2 times daily, Double the dose if sick for three days    immune globulin, human, (Gammagard) infusion Infuse 600 mL (60 g) into a venous catheter every 14 (fourteen) days.    insulin glargine (Toujeo Max Solostar- 2 unit dial) 300 unit/mL (3 mL) injection Inject 54 units under the skin every morning    insulin lispro (HumaLOG KwikPen Insulin) 100 unit/mL injection Use as directed to give up to 100 units a day    ipratropium-albuteroL (Duo-Neb) 0.5-2.5 mg/3 mL nebulizer solution 3 mL, 4 times daily PRN    lacosamide (VIMPAT) 300 mg, oral, 2 times daily    lasmiditan (Reyvow) 100 mg tablet Take 1 tablet (100 mg) by mouth if needed for migraine. Do not drive in 8 hours of taking this medicine. Maximum 1 dose per 24 hours.    levETIRAcetam (KEPPRA) 1,500 mg, oral, 2 times daily    levothyroxine (Synthroid, Levoxyl) 50 mcg tablet TAKE 1 & 1/2 TABLETS (75 MCG) BY MOUTH ONCE DAILY IN THE MORNING. TAKE BEFORE MEALS.    losartan (COZAAR) 25 mg, oral, Daily    methylPREDNISolone (Medrol) 4 mg tablet 8 tabs daily 5 days , 7 tabs daily 5 days , 6 tabs daily 5 days , 5 tabs daily 5 days , 4 tabs daily 5 days , 3 tablets daily 5 days 2 tabs daily 5 days , 1 tab daily 5 days per [facility].    miconazole (Micotin) 2 % cream 1 Application, Daily PRN    miconazole (Micotin) 2 % powder Apply to groin , under the breast and skin folds daily    moisturizing mouth (Biotene Oral Dry Mouth) solution 1 spray, 4 times daily PRN    montelukast (SINGULAIR) 10 mg, oral, Nightly    Motegrity 2 mg tablet 1 tablet, Daily    nasal spray Nayzilam 5 mg/spray (0.1 mL) spray,non-aerosol Once    ondansetron ODT (ZOFRAN-ODT) 8 mg, oral, Every 8  "hours PRN    OneTouch Delica Plus Lancet 33 gauge misc USE 1 LANCET TO CHECK GLUCOSE ONCE DAILY AS DIRECTED    OneTouch Verio test strips strip USE 1 STRIP TO CHECK GLUCOSE ONCE DAILY AS DIRECTED    pantoprazole (PROTONIX) 40 mg, 2 times daily before meals    pen needle, diabetic (BD Lela 2nd Gen Pen Needle) 32 gauge x 5/32\" needle Use as directed with insulin pen up to 5 times daily    polyethylene glycol (GLYCOLAX, MIRALAX) 17 g, 3 times daily PRN    propranolol LA (INDERAL LA) 60 mg, oral, Daily, Do not crush, chew, or split.    rOPINIRole (Requip) 0.5 mg tablet Take 1 tablet by mouth (0.5mg) in the morning and 2 tablets (1mg) by mouth in the evening    senna 8.6 mg tablet 1 tablet, Nightly    tiZANidine (ZANAFLEX) 2 mg, 2 times daily    ubrogepant (UBRELVY) 100 mg, oral, As needed, May repeat in 2 hours for max of 200mg per 24 hours.    ZINC ORAL 50 mg, Daily        Diagnostic Results:    Lab Results   Component Value Date    WBC 5.1 03/08/2025    HGB 10.4 (L) 03/08/2025    HCT 32.1 (L) 03/08/2025    MCV 84 03/08/2025     03/08/2025     Lab Results   Component Value Date    CREATININE 0.68 03/08/2025    BUN 19 03/08/2025     03/08/2025    K 3.2 (L) 03/08/2025     (H) 03/08/2025    CO2 25 03/08/2025     Lab Results   Component Value Date    INR 1.0 02/15/2025    INR 1.0 01/28/2025    INR 1.0 01/22/2025    PROTIME 11.3 02/15/2025    PROTIME 11.4 01/28/2025    PROTIME 11.1 01/22/2025       === 03/08/25 ===    CT HEAD WO IV CONTRAST    - Impression -  1. No acute intracranial hemorrhage or mass effect.  2. Similar positioning of the right frontal approach ventriculostomy  catheter with associated gliosis/edema. Similar size and morphology  of the ventricles.    I personally reviewed the images/study and I agree with the findings  as stated by Chester Coffman MD. This study was interpreted at  [facility], [place], [place].    MACRO:  None.    Signed by: Stephanie Dockery " 3/8/2025 9:43 PM  Dictation workstation:   [id number]  === 02/15/25 ===    MR BRAIN W AND WO CONTRAST    - Impression -  1. Postsurgical changes with stable positioning of right frontal  approach ventricular catheter and findings suggestive of patency of  the catheter. However, CSF flow/cine images demonstrate no definite  flow within the shunt catheter. Stable size and configuration of the  ventricular system when compared to prior exam from 02/17/2025 with  no evidence of hydrocephalus.  2. Additional chronic findings as above.    I personally reviewed the images/study and I agree with the findings  as stated by Dr. Brody Daley M.D. This study was interpreted at  [place], [place].    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 Since then, patient endorses worsening headache and pressure, as well as worsening vision in her right eye. Despite reassuring  imaging, the clinical presentation warrants ophthalmology consult to determine if vision has worsened. Further recs will follow their assessment.    Plan:  Please consult ophthalmology to assess worsening vision and papilledema, for signs of worsening IIH  Further recs pending the ophtho assessment      Rob Durham MD

## 2025-03-09 NOTE — ED PROVIDER NOTES
Emergency Department Provider Note        History of Present Illness     History provided by: Patient  Limitations to History: None  External Records Reviewed with Brief Summary: Discharge Summary from 2/18 which showed patient admitted for chronic nonintraceable headache.    HPI:  Jonathan Ayala is a 49 y.o. female seda Ayala is a 49 y.o. 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 She has a h/o multiple admissions for c/f VPS complications, most recently 1/28 to 1/31 for similar concerns, which was worked up by NSG (see D/C summary for full details) and ophthalmology, and she was discharged following VPS valve 9exchange. She presents today with dizziness and pain around her shunt.  She states she was reaching down to pick something up in her bathroom when she bumped the area around her shunt.  She states since then, she endorses a bandaid like headache around the area and some dizziness.  Patient also states she has some upper left abdominal pain.  States this is been going on for around 3 or 4 days.  Patient also stated she went to urgent care yesterday where she was diagnosed with a UTI.  Patient received antibiotics yesterday for it. No other complaints.    Physical Exam   Triage vitals:  T 36.8 °C (98.2 °F)    BP (!) 170/106  RR 16  O2 96 % None (Room air)    onstitutional:       General: She is not in acute distress.     Appearance: She is obese. She is not ill-appearing.   HENT:      Head: Normocephalic.      Comments: Closed, well approximated, right scalp incision without signs of drainage and erythema  Eyes:      General: Visual field deficit  present. No scleral icterus.     Extraocular Movements: Extraocular movements intact.      Right eye: Normal extraocular motion and no nystagmus.      Left eye: Normal extraocular motion and no nystagmus.      Pupils: Pupils are equal, round, and reactive to light. Pupils are equal.      Right eye: Pupil is round and reactive.      Left eye: Pupil is round and reactive.   Neck:      Meningeal: Brudzinski's sign absent.        Cardiovascular:      Rate and Rhythm: Normal rate and regular rhythm.      Heart sounds: Normal heart sounds. No murmur heard.  Pulmonary:      Effort: Pulmonary effort is normal. No respiratory distress.      Breath sounds: No stridor. No wheezing, rhonchi or rales.   Musculoskeletal:         General: No swelling or tenderness. Normal range of motion.      Cervical back: Normal range of motion.   Lymphadenopathy:      Cervical: No cervical adenopathy.   Skin:     General: Skin is warm and dry.      Capillary Refill: Capillary refill takes less than 2 seconds.   Neurological:      Mental Status: She is alert and oriented to person, place, and time.   Psychiatric:         Mood and Affect: Mood is normal.         Speech: Speech normal.         Behavior: Behavior normal. Behavior is not agitated.         Cognition and Memory: Memory is not impaired    Medical Decision Making & ED Course   Medical Decision Makin y.o. female presenting for headache and dizziness.  DDx includes intracranial hypertension from obstructed or discontinuous VPS, infection 2/2 VPS manipulation.  CBC shown no leukocytosis.     CT showing no acute intracranial abnormalities and no interval changes to the VPS. XR shunt series shows no kinking or discontinuity of the VPS. Neurosurgery was consulted for evaluation and possible adjustment of the shunt.  On their exam, patient complained of changing vision.  As a request per neurosurgery, ophthalmology was consulted.  No change in vision and no papilledema.  Neurosurgery  adjusted the shunt back to two.  Patient to be transferred to CDU for observation.      Social Determinants of Health which Significantly Impact Care: None identified     EKG Independent Interpretation: EKG interpreted by myself. Please see ED Course for full interpretation.    Independent Result Review and Interpretation: Relevant laboratory and radiographic results were reviewed and independently interpreted by myself.  As necessary, they are commented on in the ED Course.    Chronic conditions affecting the patient's care: As documented above in [facility]    The patient was discussed with the following consultants/services: ophthalmology, neurosurgery    Care Considerations: As documented above in [facility]    ED Course:     Disposition   Patient was admitted to the CDU for observation.  Patient is okay with this.    Procedures   Procedures    Patient seen and discussed with ED attending physician.    Forest Blackwell DPM  Emergency Medicine      Forest Blackwell DPM  Resident  03/09/25 0438

## 2025-03-09 NOTE — H&P
History and Physical  [facility] CLINICAL DECISION UNIT      MRN:  [id number]   Date of Placement in CDU: 3/8/2025   Time Placed in Observation: 4:49 AM  ED Provider: Brittney Cantu PA-C     Limitations to history: None  Independent Historian: patient  External Records Reviewed: EMR, outside records, Care-everywhere      Patient History  Jonathan Ayala  is a 49 y.o. 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  Patient states she bumped her head over the region of her shunt on her bathroom sink, reports then she has had worsening headache and pressure, worsening vision in her right eye.  She stated that the headache and discomfort felt similar to her symptoms prior to the shunt being placed.  Also endorsed left sided abdominal pain ongoing for 3 to 4 days, is currently being treated for UTI with cipro 500 BID. The acute evaluation while in the ED included CBC which showed anemia with an H&H of 10.4, 32.1; CMP with a glucose of 123, potassium 3.2 and was given 40 p.o. in the ED.  Lipase WNL.  X-ray shunt series revealed right ventriculostomy shunt catheter, No discontinuity or kinking of the shunt catheter. Nonobstructive bowel gas pattern. Significant colonic stool burden suggestive of constipation.  CT head wo revealed no acute intracranial hemorrhage or mass effect.  Similar positioning of the right frontal approach ventriculostomy  catheter with associated gliosis/edema. Similar size and morphology  of the ventricles.   Neurosurgery and ophthalmology were consulted in the ED.  Neurosurgery dialed shunt to 2 from 3 given  the patient's symptoms and increase headache following a recent shunt dial where it was increased to 3.  Ophthalmology note, no papilledema on exam, vision is stable OD and slightly worse OS, recommended follow-up with neuro-ophthalmology scheduled 4/4/2025. No acute ophtho intervention.   It was elected that the patient be placed in the CDU for headache.    Upon admission to the Clinical Decision Unit, Jonathan Ayala is hemodynamically stable. States that her headache has remained consistent despite zofran, benadryl, reglan, tylenol. Otherwise is doing well, ambulating to the bathroom with minimal assistance aside from her cane.       Past Medical History: reviewed, see EMR and HPI  Past Surgical History: reviewed, see EMR  Social History: No tobacco use. Denies EtOH and illict substance use.  Family History: Non-contributory      Medications: reviewed      Scheduled medications      - acetaminophen, 975 mg, oral, Once  amitriptyline, 100 mg, oral, Nightly  ciprofloxacin, 500 mg, oral, q12h OLESYA  clonazePAM, 0.5 mg, oral, BID  divalproex, 500 mg, oral, BID  ezetimibe, 10 mg, oral, Daily  furosemide, 20 mg, oral, BID  hydrocortisone, 10 mg, oral, BID  ipratropium-albuteroL, 3 mL, nebulization, Once  lacosamide, 300 mg, oral, BID  levETIRAcetam, 1,500 mg, oral, BID  levothyroxine, 75 mcg, oral, Daily  losartan, 25 mg, oral, Daily  lubricating eye drops, 2 drop, Both Eyes, TID  montelukast, 10 mg, oral, Nightly  pantoprazole, 40 mg, oral, BID AC  propranolol LA, 60 mg, oral, Daily  sodium chloride, 1,000 mL, intravenous, Once  tiZANidine, 2 mg, oral, BID         Continuous medications      -       PRN medications      - PRN medications: ipratropium-albuteroL, ondansetron ODT, polyethylene glycol       Review of Systems: A 14 pt ROS was completed and is otherwise negative unless mentioned above.      Physical Examination  VS reviewed and noted NAD. Afebrile.   General: Patient is awake, conversant, well-nourished and  overall well-appearing.    Head: NC/AT. No apparent traumatic injuries. Right scalp incision, closed, without signs of drainage or wound dehiscence or trauma.    Eyes: EOMI, sclerae anicteric. Wearing light filtering glasses.    ENT: Hearing grossly intact. MMM without lesions. Posterior oropharynx unremarkable. Normal phonation without stridor, drooling or trismus.   Neck: Supple, full ROM without meningismus. No lymphadenopathy.   Cardiac: Regular rate & rhythm. No murmur, gallops or rubs.    Lungs: CTAB with normal respiratory effort and good aeration throughout. No accessory muscle usage or adventitious breath sounds. Chest wall is non-tender to palpation.    Abdomen: Soft, non-tender, nonsurgical. No masses. No rebound, rigidity or guarding. Normoactive BS   MSK: Full ROM intact. Symmetric muscle bulk without step-offs or gross deformity.   Vascular: Pulses full and equal bilaterally. No cyanosis, clubbing or pitting LE edema. Capillary refill < 2s   Skin: Warm and intact without rashes, lesions or discoloration. Turgor is good.   Neuro: CN II-XII grossly intact. A&Ox3. Speech is clear and fluent. No focal deficits identified.   Psychiatric: Mood and affect are appropriate for situation.      Diagnostic Evaluation  Diagnostic studies and ED interventions germane to this period of clinical observation will include: IV fluids, magnesium lab draw, continue symptom management   Labs Reviewed   CBC WITH AUTO DIFFERENTIAL - Abnormal       Result Value    WBC 5.1      nRBC 0.0      RBC 3.82 (*)     Hemoglobin 10.4 (*)     Hematocrit 32.1 (*)     MCV 84      MCH 27.2      MCHC 32.4      RDW 17.4 (*)     Platelets 244      Neutrophils % 58.8      Immature Granulocytes %, Automated 0.4      Lymphocytes % 34.5      Monocytes % 5.3      Eosinophils % 0.6      Basophils % 0.4      Neutrophils Absolute 3.00      Immature Granulocytes Absolute, Automated 0.02      Lymphocytes Absolute 1.76      Monocytes Absolute 0.27       Eosinophils Absolute 0.03      Basophils Absolute 0.02     COMPREHENSIVE METABOLIC PANEL - Abnormal    Glucose 123 (*)     Sodium 143      Potassium 3.2 (*)     Chloride 109 (*)     Bicarbonate 25      Anion Gap 12      Urea Nitrogen 19      Creatinine 0.68      eGFR >90      Calcium 7.8 (*)     Albumin 3.3 (*)     Alkaline Phosphatase 70      Total Protein 6.3 (*)     AST 19      Bilirubin, Total 0.2      ALT 27     LIPASE - Normal    Lipase 40      Narrative:     Venipuncture immediately after or during the administration of Metamizole may lead to falsely low results. Testing should be performed immediately prior to Metamizole dosing.   MAGNESIUM         Consultants (if applicable)  1) neurosurgery  2) ophthalmology      Impression and Plan  In summary, Jonathan Ayala was admitted to the [facility] Clinical Decision Unit for headache. Dr. Moreira is the CDU admission attending.    This patient has been risk-stratified based on available history, physical exam, and related study findings. Admission to the observation status for further diagnosis/treatment/monitoring of headache is warranted clinically. This extended period of observation is specifically required to determine the need for hospitalization.     The goals of this admission based on the patient´s clinical problem list are:  1) Headache, unspecified      -- IV fluids, tylenol, follow headache order set      -- allergic to NSAIDs    -- continue home meds     -- magnesium to be drawn, stated that in the past that has helped alongside decadron     We will observe the patient for the following endpoints:   1) symptom improvement  2) stable VS     When met, appropriate disposition will be arranged    Brittney Cantu PA-C  [facility]  Emergency Department

## 2025-03-09 NOTE — PROGRESS NOTES
"Jonathan Ayala is a 49 y.o. female on day 1 of admission presenting with a headache.  Her shunt setting was dialed down to certas 2 by neurosurgery and she was admitted to the CDU for observation and pain management.   She was doing better this morning and this afternoon had a burning headache for which she received Toradol, Benadryl and Reglan as well as IV fluid. She was seen by neurosurgery who told me that often after shunt adjustments may take a day for the patient to fully feel the benefits. We felt that since we still have time (she has been in the CDU for 11.5 hrs) that we can give her some more time and hopefully discharge tomorrow morning.   Neurosurgery mentioned reaching out to neurology at some point but also felt that we have time to let the adjustment take affect.   Either way, there is no surgical intervention needed by neurosurgery.   The patient is aware of the plan.     Physical Exam  Constitutional:       Appearance: She is obese.   HENT:      Head: Normocephalic.      Mouth/Throat:      Mouth: Mucous membranes are moist.   Eyes:      Extraocular Movements: Extraocular movements intact.      Pupils: Pupils are equal, round, and reactive to light.   Cardiovascular:      Rate and Rhythm: Normal rate and regular rhythm.   Pulmonary:      Effort: Pulmonary effort is normal.      Breath sounds: Normal breath sounds.   Abdominal:      Palpations: Abdomen is soft.   Musculoskeletal:      Cervical back: Normal range of motion and neck supple.   Skin:     General: Skin is warm and dry.   Neurological:      Mental Status: She is alert and oriented to person, place, and time.   Psychiatric:         Mood and Affect: Mood normal.         Behavior: Behavior normal.         Last Recorded Vitals  Blood pressure 118/57, pulse 67, temperature 36.8 °C (98.2 °F), temperature source Temporal, resp. rate 16, height 1.575 m (5' 2\"), weight 109 kg (240 lb 4.8 oz), SpO2 99%.  Intake/Output last 3 Shifts:  No intake/output " data recorded.    Relevant Results  CT head wo IV contrast    Result Date: 3/8/2025  Interpreted By:  Stephanie Dockery and Nakamoto Kent STUDY: CT HEAD WO IV CONTRAST;  3/8/2025 8:25 pm   INDICATION: Signs/Symptoms:Shunt series workup.   COMPARISON: MR brain 02/17/2025, CT head 02/15/2025   ACCESSION NUMBER(S): [id number]   ORDERING CLINICIAN: ALBERT GARCIA   TECHNIQUE: Noncontrast axial CT images of head were obtained with coronal and sagittal reconstructed images.   FINDINGS: BRAIN PARENCHYMA:  No acute intraparenchymal hemorrhage or parenchymal evidence of acute large territory ischemic infarct. No mass-effect. Gray-white matter distinction is preserved. Similar hypodensity along the path of the right frontal approach ventriculostomy catheter compared to prior exam, likely representing edema or gliosis.   VENTRICLES and EXTRA-AXIAL SPACES: Similar positioning of right frontal approach ventriculostomy catheter with tip terminating at the right foramen of Monro. No acute extra-axial or intraventricular hemorrhage. No effacement of cerebral sulci. Ventricles and sulci are stable in size compared to prior exam.   PARANASAL SINUSES/MASTOIDS:  No hemorrhage or air-fluid levels within the visualized paranasal sinuses. The mastoids are well aerated.   CALVARIUM/ORBITS:  No skull fracture. The orbits and globes are intact to the extent visualized.   EXTRACRANIAL SOFT TISSUES: No discernible abnormality.       1. No acute intracranial hemorrhage or mass effect. 2. Similar positioning of the right frontal approach ventriculostomy catheter with associated gliosis/edema. Similar size and morphology of the ventricles.   I personally reviewed the images/study and I agree with the findings as stated by Chester Coffman MD. This study was interpreted at [facility], [place], [place].   MACRO: None.   Signed by: Stephanie Dockery 3/8/2025 9:43 PM Dictation workstation:   [id number]    XR shunt  series    Result Date: 3/8/2025  Interpreted By:  Stephanie Dockery and Nakamoto Kent STUDY: XR SHUNT SERIES;  3/8/2025 8:15 pm   INDICATION: Signs/Symptoms:Patient bumped area, check integrity.   COMPARISON: Same day CT head, shunt series radiograph 02/15/2025, MR brain 02/17/2025   ACCESSION NUMBER(S): [id number]   ORDERING CLINICIAN: ALBERT GARCAI   FINDINGS: Two views of the skull in AP and lateral projection, a single AP view of the chest and a single AP view of the abdomen and pelvis were obtained. Evaluation partially limited due to patient body habitus with overlying soft tissues.   A  right ventriculostomy shunt catheter is present. Shunt tubing is seen extending over the  right lateral skull,  right neck,  right anterior chest and is seen to coil over the left lower abdomen and terminating within the pelvic inlet. The catheter appears to terminate within the right side of the pelvic inlet compared to the left side of the pelvic inlet on prior  images of CT abdomen and pelvis 02/16/2025. No discontinuity or kinking of the shunt catheter.   Right chest wall MediPort with tip in the region of the right atrium, similar to prior imaging. Cardiomediastinal silhouette is enlarged, similar to prior imaging. Mild hilar fullness. No pulmonary consolidation, pleural effusion or pneumothorax. There are decreased lung volumes with associated bronchovascular crowding. Nonobstructive bowel gas pattern. Significant colonic stool burden. Cholecystectomy clips. No acute osseous abnormality.       1.  Right ventriculostomy shunt catheter, as described above. No discontinuity or kinking of the shunt catheter. 2.  Enlarged cardiac silhouette with possible pulmonary vascular congestion, unchanged. No focal infiltrate or pneumothorax. 3.  Nonobstructive bowel gas pattern. Significant colonic stool burden suggestive of constipation.   I personally reviewed the images/study and I agree with the findings as stated by  Chester Coffman MD. This study was interpreted at [facility], [place], [place].   MACRO: None   Signed by: Stephanie Whalenaram 3/8/2025 9:37 PM Dictation workstation:   [id number]    OCT, Optic Nerve - OU - Both Eyes    Result Date: 2/20/2025  Retinal multimodal imaging including photography was completed, and the findings are described in the examination.    BI US breast limited right    Result Date: 2/19/2025  * * *Final Report* * * DATE OF EXAM: Feb 19 2025  2:30PM   BCW   [id number]  -  [facility] US BREAST LTD RT  / ACCESSION #  [id number] PROCEDURE REASON: Category 3 mammography result with short follow-up interval suggested for probab      * * * * Physician Interpretation * * * *  [place] [address] Phone: [phone] #[phone] - JENNIFER DIAG W IRASEMA JEFF #[id number] - [facility] US BREAST LTD RT HISTORY: 49 year-old patient seen for diagnostic evaluation of short term follow-up from a previous mammogram in the right breast. COMPARISON STUDIES: The present examination has been compared to prior imaging studies dated 09/13/2023 (MRI), 10/24/2023 (mammogram), 11/14/2023 (mammogram), 04/23/2024 (mammogram) and 04/23/2024 (ultrasound). MAMMOGRAM TECHNIQUE: The study was acquired using full field digital technology and interpreted from soft copy. Digital Breast Tomosynthesis (DBT) images were obtained and used to assist in the interpretation of this examination. MAMMOGRAM FINDINGS: The breasts are extremely dense, which lowers the sensitivity of mammography. There are no suspicious mammographic findings to correspond with the area of the mass previously identified on ultrasound. No suspicious masses, calcifications or other abnormalities are seen in either breast. Stable benign appearing calcifications scattered in both breasts. ULTRASOUND TECHNIQUE: Targeted ultrasound of the indicated area was performed. Gray scale images were saved.  ULTRASOUND FINDINGS: There is a mass measuring 0.3 x 0.3 x 0.3 cm in the right breast at 2 o'clock, 6 cm from the nipple. Internal echotexture is hypoechoic. There is posterior acoustic enhancement. Color flow imaging demonstrates vascularity is not present. This resembles a complicated cyst or an oil cyst. This is decreased in size compared to prior, compatible with benign etiology. There are no suspicious findings in the imaged area.    IMPRESSION: There is no mammographic or sonographic evidence of malignancy. Return to annual screening mammogram is recommended. Annual mammogram will be due in 1 year. BI-RADS Category 2: Benign RISK:  Based on the Tyrer-Cuzick (TC) risk assessment model, this patient has a 38.3% lifetime risk of developing breast cancer, meaning they are at high risk for developing breast cancer. However, this is only an estimate based on available history provided on the patient's questionnaire. Because patients with a lifetime risk of 20% or greater may benefit from additional supplemental screening, we encourage a full breast clinical evaluation and comprehensive breast cancer risk assessment to guide further decision making. For more information regarding the management of high-risk patients, the following is a link to the [facility] care path https://ccf.[employer] [place] [employer] [place]/dotNet/documents/?ruqvt=09096. Additionally, a referral to the [facility] is also appropriate. Interpreting Radiologist: Mary Anne Abdul M.D. Electronically signed on: 02/19/2025 : OSMAN Transcribe Date/Time: Feb 19 2025  2:17P Dictated by : MARY ANNE ABDUL MD This examination was interpreted and the report reviewed and electronically signed by: MARY ANNE ABDUL MD on Feb 19 2025  3:02PM  EST    BI mammo bilateral diagnostic tomosynthesis    Result Date: 2/19/2025  * * *Final Report* * * DATE OF EXAM: Feb 19 2025  1:53PM   NATALIA   [id number]  -  JENNIFER SARBJIT AGUILAR  / ACCESSION #   [id number] PROCEDURE REASON: multiple diagnoses      * * * * Physician Interpretation * * * *  [place] [address] Phone: [phone] #[phone] - XBI SARBJIT AGUILAR #[phone] - [facility] US BREAST LTD RT HISTORY: 49 year-old patient seen for diagnostic evaluation of short term follow-up from a previous mammogram in the right breast. COMPARISON STUDIES: The present examination has been compared to prior imaging studies dated 09/13/2023 (MRI), 10/24/2023 (mammogram), 11/14/2023 (mammogram), 04/23/2024 (mammogram) and 04/23/2024 (ultrasound). MAMMOGRAM TECHNIQUE: The study was acquired using full field digital technology and interpreted from soft copy. Digital Breast Tomosynthesis (DBT) images were obtained and used to assist in the interpretation of this examination. MAMMOGRAM FINDINGS: The breasts are extremely dense, which lowers the sensitivity of mammography. There are no suspicious mammographic findings to correspond with the area of the mass previously identified on ultrasound. No suspicious masses, calcifications or other abnormalities are seen in either breast. Stable benign appearing calcifications scattered in both breasts. ULTRASOUND TECHNIQUE: Targeted ultrasound of the indicated area was performed. Gray scale images were saved. ULTRASOUND FINDINGS: There is a mass measuring 0.3 x 0.3 x 0.3 cm in the right breast at 2 o'clock, 6 cm from the nipple. Internal echotexture is hypoechoic. There is posterior acoustic enhancement. Color flow imaging demonstrates vascularity is not present. This resembles a complicated cyst or an oil cyst. This is decreased in size compared to prior, compatible with benign etiology. There are no suspicious findings in the imaged area.    IMPRESSION: There is no mammographic or sonographic evidence of malignancy. Return to annual screening mammogram is recommended. Annual mammogram will be due in 1 year.  BI-RADS Category 2: Benign RISK:  Based on the Tyrer-Cuzick (TC) risk assessment model, this patient has a 38.3% lifetime risk of developing breast cancer, meaning they are at high risk for developing breast cancer. However, this is only an estimate based on available history provided on the patient's questionnaire. Because patients with a lifetime risk of 20% or greater may benefit from additional supplemental screening, we encourage a full breast clinical evaluation and comprehensive breast cancer risk assessment to guide further decision making. For more information regarding the management of high-risk patients, the following is a link to the [facility] care path https://ccf.[employer] Ring[employer]/documents/?skkyx=12895. Additionally, a referral to the [facility] is also appropriate. Interpreting Radiologist: Mary Anne Abdul M.D. Electronically signed on: 02/19/2025 : OSMAN Transcribe Date/Time: Feb 19 2025  1:20P Dictated by : MARY ANNE ABDUL MD This examination was interpreted and the report reviewed and electronically signed by: MARY ANNE ABDUL MD on Feb 19 2025  3:02PM  EST    MR brain w and wo IV contrast    Result Date: 2/18/2025  Interpreted By:  Erwin Barrett and Baker Zachary STUDY: MR BRAIN W AND WO IV CONTRAST;  2/17/2025 5:40 pm   INDICATION: Signs/Symptoms:shunt evaluation, Blitz protocol.   COMPARISON: MRI orbit/brain on 10/22/2024   ACCESSION NUMBER(S): [id number]   ORDERING CLINICIAN: CHILANGO ALVAREZ   TECHNIQUE: Multiplanar multisequence images of the brain were obtained before and after intravenous administration of 10 mmol Gadovist.   FINDINGS: Susceptibility artifact from ventricular catheter somewhat limits evaluation of the right frontoparietal region.   Postsurgical changes from right frontal approach ventricular catheter again noted with tip terminating in the region of the foramen Monro, stable in positioning compared to prior exam.  Similar encephalomalacia/gliosis within the right superior frontal lobe along the course of the ventriculostomy catheter. The ventricular system is similar in size and configuration compared to prior exam with no evidence of hydrocephalus. The basal cisterns are patent. No extra-axial fluid collection. Similar generalized cerebral parenchymal volume loss.   Coronal CISS images demonstrate relatively hyperintense signal throughout the course of the catheter, suggestive of patency of the shunt. However, CSF flow/cine images demonstrate no definite flow within the shunt.   Mild nonspecific T2/FLAIR hyperintense signal throughout the periventricular white matter, which may be sequelae of chronic small-vessel ischemic disease. No abnormal areas of enhancement. No diffusion restriction abnormality to suggest acute infarct. No mass effect or midline shift.   Visualized paranasal sinuses and mastoid air cells are predominantly clear.       1. Postsurgical changes with stable positioning of right frontal approach ventricular catheter and findings suggestive of patency of the catheter. However, CSF flow/cine images demonstrate no definite flow within the shunt catheter. Stable size and configuration of the ventricular system when compared to prior exam from 02/17/2025 with no evidence of hydrocephalus. 2. Additional chronic findings as above.   I personally reviewed the images/study and I agree with the findings as stated by Dr. Brody Daley M.D. This study was interpreted at [place], [place].   MACRO: None   Signed by: Erwin Barrett 2/18/2025 12:45 PM Dictation workstation:   [id number]    CT chest abdomen pelvis w IV contrast    Result Date: 2/16/2025  Interpreted By:  Fabio Mcclelland and Beyersdorf Conner STUDY: CT CHEST ABDOMEN PELVIS W IV CONTRAST;  2/16/2025 12:14 am   INDICATION: Signs/Symptoms:infectious work up, rule out pseudocyst.     COMPARISON: CT abdomen  pelvis 01/23/2025   ACCESSION NUMBER(S): [id number]   ORDERING CLINICIAN: ANDREAS WYATT   TECHNIQUE: Contiguous axial images of the chest, abdomen, and pelvis were obtained after the intravenous administration of iodinated contrast. Coronal and sagittal reformatted images were reconstructed from the axial data. CT chest was obtained using PE angiographic protocol with MIP images created on a separate independent workstation.   FINDINGS: CT CHEST:   MEDIASTINUM AND LYMPH NODES: The esophageal wall appears within normal limits. No enlarged intrathoracic or axillary lymph nodes by imaging criteria. No pneumomediastinum.   VESSELS: Right chest MediPort tip terminates in the right atrium. 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 Normal caliber thoracic aorta without dissection. No significant aortic atherosclerosis.   HEART: Normal size. No coronary artery calcifications. No significant pericardial effusion.   LUNG, AIRWAYS, PLEURA: The large airways are clear without evidence of the endobronchial mass or significant debris. Bandlike opacities in the lingula and marginating the posterior lung bases likely represent subsegmental atelectasis. No consolidation, pulmonary edema, effusion, or pneumothorax.   CHEST WALL SOFT TISSUES: No discernible acute abnormality. The ventriculoperitoneal shunt catheter can be seen in the right lateral neck, and tracking inferiorly through the subcutaneous tissue of the anterior chest. There is no evidence of kinking/discontinuity or surrounding fluid collection.   OSSEOUS STRUCTURES: No acute osseous abnormality.     CT ABDOMEN/PELVIS:   ABDOMINAL WALL: The ventriculoperitoneal shunt catheter contiguous through the subcutaneous tissues of the anterior abdominal wall, before entering the peritoneal cavity through the left rectus abdominis muscle. Catheter courses into the pelvis and terminates in the left perirectal fat. Again, there is no evidence of discontinuity,  kinking, or surrounding fluid collection.   LIVER: No significant parenchymal abnormality.   BILE DUCTS: No significant intrahepatic or extrahepatic dilatation.   GALLBLADDER: Surgically removed.   PANCREAS: No significant abnormality.   SPLEEN: No significant abnormality.   ADRENALS: No significant abnormality.   KIDNEYS, URETERS, BLADDER:  No significant abnormality.   REPRODUCTIVE ORGANS: The uterus is surgically removed.   VESSELS: No significant abnormality.   RETROPERITONEUM/LYMPH NODES: No acute retroperitoneal abnormality. No enlarged lymph nodes.   BOWEL/PERITONEUM: Small gastric diverticulum identified on series 201, image 75. No inflammatory bowel wall thickening or dilatation. The appendix has been surgically removed. Sigmoid diverticulosis without evidence to suggest diverticulitis.   Trace amount of simple attenuating fluid layering in the deep pelvis. No pneumoperitoneum or focal fluid collections. Specifically, no collection surrounding the tip of the ventriculoperitoneal shunt catheter.     MUSCULOSKELETAL: No acute osseous abnormality. No suspicious osseous lesion.       1. No acute abnormality in the chest, abdomen, or pelvis. 2. The ventriculoperitoneal shunt catheter is intact throughout its course from the chest to the pelvis without discontinuity, kinking, or surrounding fluid collection/abscess, terminating in the left upper perirectal fat.       I personally reviewed the image(s)/study and resident interpretation. I agree with the findings as stated by resident Martin Weston. Data analyzed and images interpreted at [facility], [place], [place].   MACRO: None.   Signed by: Fabio Mcclelland 2/16/2025 1:16 AM Dictation workstation:   [id number]    XR shunt series    Result Date: 2/15/2025  Interpreted By:  Fabio Mcclelland and Beyersdorf Conner STUDY: XR SHUNT SERIES; 2/15/2025 8:33 pm   INDICATION: Signs/Symptoms: shunt, HA.   COMPARISON: X-ray shunt  series 01/20/2025   ACCESSION NUMBER(S): [id number]   ORDERING CLINICIAN: ANDREAS WYATT   FINDINGS: Two views of the skull in AP and lateral projection, a single AP view of the chest and a 2 AP views of the abdomen were obtained. A  right parietal ventriculostomy shunt catheter is present. Shunt tubing is seen extending over the  right lateral skull,  right neck,  right anterior chest, midline of the abdomen, and terminates in the right lower quadrant of the pelvis. Additionally, there is a right chest MediPort with its tip terminating in the expected location of the right atrium.   There are low lung volumes with increased bronchovascular crowding and minimal bibasilar atelectasis.. There is a nonobstructive bowel gas pattern present. Cholecystectomy clips noted. No evidence of acute fracture is identified.  The visualized paranasal sinuses are clear.       1.  Right parietal ventriculostomy shunt catheter without kinking or discontinuity, as described above. 2. No acute cardiopulmonary process or bowel obstruction.   I personally reviewed the image(s)/study and resident interpretation. I agree with the findings as stated by resident Martin Weston. Data analyzed and images interpreted at [facility], [place], [place].   MACRO: None   Signed by: Fabio Mcclelland 2/15/2025 9:11 PM Dictation workstation:   [id number]    CT head wo IV contrast    Result Date: 2/15/2025  Interpreted By:  Fabio Mcclelland and Benza Andrew STUDY: CT HEAD WO IV CONTRAST;  2/15/2025 8:30 pm   INDICATION: Signs/Symptoms: shunt, HA, nausea.     COMPARISON: CT head dated 01/29/2025   ACCESSION NUMBER(S): [id number]   ORDERING CLINICIAN: ANDREAS WYATT   TECHNIQUE: Noncontrast axial CT images of head were obtained with coronal and sagittal reconstructed images.   FINDINGS: BRAIN PARENCHYMA: No evidence of acute intraparenchymal hemorrhage or parenchymal evidence of an acute large territory  ischemic infarct. No mass-effect, midline shift or effacement of cerebral sulci. Gray-white matter distinction is preserved. Similar appearance compared to prior of hypodensity along the path of the ventriculostomy catheter, representing edema or gliosis.   VENTRICLES and EXTRA-AXIAL SPACES: Stable positioning of right frontal approach ventriculostomy catheter with tip terminating near the right foramen of Monro. The shunt tubing is intact to the extent included in the study. No acute extra-axial or intraventricular hemorrhage. The ventricular system is unchanged in size and configuration compared to prior examination.   PARANASAL SINUSES/MASTOIDS: No hemorrhage or air-fluid levels within the visualized paranasal sinuses. The mastoids are well aerated.   CALVARIUM/ORBITS: No skull fracture. The orbits and globes are intact to the extent visualized.   EXTRACRANIAL SOFT TISSUES: No discernible abnormality.       1. No acute intracranial abnormality or calvarial fracture. 2. No significant interval change in appearance of the head compared to prior examination with unchanged position of right frontal approach ventriculostomy catheter.   I personally reviewed the images/study and I agree with the findings as stated above by resident physician, Fabio Dupont MD. This study was interpreted at [place], [place].   MACRO: None.   Signed by: Fabio Mcclelland 2/15/2025 9:10 PM Dictation workstation:   [id number]    Results for orders placed or performed during the hospital encounter of 03/08/25 (from the past 24 hours)   CBC and Auto Differential   Result Value Ref Range    WBC 5.1 4.4 - 11.3 x10*3/uL    nRBC 0.0 0.0 - 0.0 /100 WBCs    RBC 3.82 (L) 4.00 - 5.20 x10*6/uL    Hemoglobin 10.4 (L) 12.0 - 16.0 g/dL    Hematocrit 32.1 (L) 36.0 - 46.0 %    MCV 84 80 - 100 fL    MCH 27.2 26.0 - 34.0 pg    MCHC 32.4 32.0 - 36.0 g/dL    RDW 17.4 (H) 11.5 - 14.5 %    Platelets 244 150 - 450  x10*3/uL    Neutrophils % 58.8 40.0 - 80.0 %    Immature Granulocytes %, Automated 0.4 0.0 - 0.9 %    Lymphocytes % 34.5 13.0 - 44.0 %    Monocytes % 5.3 2.0 - 10.0 %    Eosinophils % 0.6 0.0 - 6.0 %    Basophils % 0.4 0.0 - 2.0 %    Neutrophils Absolute 3.00 1.20 - 7.70 x10*3/uL    Immature Granulocytes Absolute, Automated 0.02 0.00 - 0.70 x10*3/uL    Lymphocytes Absolute 1.76 1.20 - 4.80 x10*3/uL    Monocytes Absolute 0.27 0.10 - 1.00 x10*3/uL    Eosinophils Absolute 0.03 0.00 - 0.70 x10*3/uL    Basophils Absolute 0.02 0.00 - 0.10 x10*3/uL   Comprehensive metabolic panel   Result Value Ref Range    Glucose 123 (H) 74 - 99 mg/dL    Sodium 143 136 - 145 mmol/L    Potassium 3.2 (L) 3.5 - 5.3 mmol/L    Chloride 109 (H) 98 - 107 mmol/L    Bicarbonate 25 21 - 32 mmol/L    Anion Gap 12 10 - 20 mmol/L    Urea Nitrogen 19 6 - 23 mg/dL    Creatinine 0.68 0.50 - 1.05 mg/dL    eGFR >90 >60 mL/min/1.73m*2    Calcium 7.8 (L) 8.6 - 10.6 mg/dL    Albumin 3.3 (L) 3.4 - 5.0 g/dL    Alkaline Phosphatase 70 33 - 110 U/L    Total Protein 6.3 (L) 6.4 - 8.2 g/dL    AST 19 9 - 39 U/L    Bilirubin, Total 0.2 0.0 - 1.2 mg/dL    ALT 27 7 - 45 U/L   Lipase   Result Value Ref Range    Lipase 40 9 - 82 U/L   Magnesium   Result Value Ref Range    Magnesium 2.18 1.60 - 2.40 mg/dL   POCT GLUCOSE   Result Value Ref Range    POCT Glucose 143 (H) 74 - 99 mg/dL   POCT GLUCOSE   Result Value Ref Range    POCT Glucose 249 (H) 74 - 99 mg/dL   POCT GLUCOSE   Result Value Ref Range    POCT Glucose 204 (H) 74 - 99 mg/dL     *Note: Due to a large number of results and/or encounters for the requested time period, some results have not been displayed. A complete set of results can be found in Results Review.       Scheduled medications  amitriptyline, 100 mg, oral, Nightly  ciprofloxacin, 500 mg, oral, q12h OLESYA  clonazePAM, 0.5 mg, oral, BID  divalproex, 500 mg, oral, BID  ezetimibe, 10 mg, oral, Daily  furosemide, 20 mg, oral, BID  hydrocortisone, 10 mg,  oral, BID  insulin lispro, 0-10 Units, subcutaneous, q4h  lacosamide, 300 mg, oral, BID  levETIRAcetam, 1,500 mg, oral, BID  levothyroxine, 75 mcg, oral, Daily  losartan, 25 mg, oral, Daily  lubricating eye drops, 2 drop, Both Eyes, TID  montelukast, 10 mg, oral, Nightly  pantoprazole, 40 mg, oral, BID AC  propranolol LA, 60 mg, oral, Daily  tiZANidine, 2 mg, oral, BID      Continuous medications  lactated Ringer's, 125 mL/hr, Last Rate: 125 mL/hr (03/09/25 0135)      PRN medications  PRN medications: dextrose, dextrose, glucagon, glucagon, ipratropium-albuteroL, ondansetron ODT, polyethylene glycol    Assessment & Plan  Acute intractable headache, unspecified headache type      Plan:  Will give her time to adjust after dialing down her shunt.  Hopefully we can discharge her tomorrow morning. If no improvement - will consult neurology.     RAUL Ortiz-CNP, DNP  Available via Secure Chat.

## 2025-03-09 NOTE — CONSULTS
"Reason for consult  \"New vision change\"    History Of Present Illness  Jonathan Ayala is a 49 y.o. 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     Patient seen at bedside, very sleepy and requiring frequent redirectioning. Blurry vision started on Wednesday in both eyes (OD worse than OS), diffuse, constant, progressive worsening since onset. On Friday, patient bumped R head on bathroom sink and began having R-sided headaches that radiate to occipital. Tried Ubrelvy and Reyvow with minimal relief. Still takes furosemide 20 mg BID. She also reports hearing \"the ocean\" in her R ear and 3 episodes of TMVL in the right eye. These symptoms are similar to prior episodes of elevated ICP per patient. Denies diplopia, flashes, floaters, curtain in vision.    Last seen by neuro-ophthalmology Dr. Mederos on 2/20/2025.     Past Medical History  She has a past medical history of Abnormal findings on diagnostic imaging of other abdominal regions, including retroperitoneum (10/14/2020), Acquired deformity of nose (03/24/2022), Acute upper respiratory infection, unspecified (10/16/2019), Adrenal disease (Multi), Allergic, Allergy status to unspecified drugs, medicaments and biological substances (05/22/2020), Allergy status to unspecified drugs, medicaments and biological substances (11/13/2020), Anemia, Anxiety (2005), Asthma, Benign intracranial hypertension (01/27/2022), Bipolar disorder, unspecified (Multi), Breast calcification, right (08/21/2018), Cellulitis of abdominal wall (09/28/2022), Cervicalgia (07/01/2020), Chronic maxillary sinusitis (01/04/2022), Chronic sialoadenitis (03/16/2020), COVID-19 (01/06/2022), Decreased white blood cell count, unspecified (11/04/2019), Disease of " thyroid gland, Disturbances of salivary secretion (03/16/2020), Dry eye syndrome of bilateral lacrimal glands (10/07/2022), Encounter for preprocedural cardiovascular examination (02/01/2022), Food additives allergy status (06/11/2020), Fracture of nasal bones, initial encounter for closed fracture (03/03/2022), GERD (gastroesophageal reflux disease) (15 years old), Granuloma of right orbit (10/07/2021), History of endometrial ablation (11/09/2017), Hyperlipidemia, Hypertension, Hyperthyroidism, Hypoglycemia, Hypothyroidism, Immunocompromised, Localized swelling, mass and lump, head (03/24/2022), Major depressive disorder, recurrent, in full remission ([facility]) (10/07/2021), 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), Meningitis ([facility]) (2008), Migraine, Nipple discharge (08/24/2022), Ocular pain, right eye (10/07/2022), Optic atrophy, Other abnormal and inconclusive findings on diagnostic imaging of breast (07/06/2020), Other anomalies of pupillary function (05/31/2019), Other chest pain (05/18/2020), Other conditions influencing health status (08/01/2022), Other conditions influencing health status (08/03/2021), Other specified disorders of eustachian tube, left ear (11/18/2019), Other specified disorders of nose and nasal sinuses (03/24/2022), Other specified disorders of nose and nasal sinuses (03/24/2022), Pelvic and perineal pain (07/06/2020), Personal history of other diseases of the circulatory system (04/07/2020), Personal history of other diseases of the circulatory system (04/07/2020), Personal history of other diseases of the circulatory system, Personal history of other diseases of the digestive system, Personal history of other diseases of the digestive system (03/02/2020), Personal history of other diseases of the musculoskeletal system and connective tissue (01/19/2022), Personal history of other diseases of the musculoskeletal system and connective tissue (03/02/2021), Personal history of  other diseases of the musculoskeletal system and connective tissue (06/16/2020), Personal history of other diseases of the nervous system and sense organs (11/18/2019), Personal history of other diseases of the nervous system and sense organs (09/21/2022), Personal history of other diseases of the respiratory system (04/14/2021), Personal history of other endocrine, nutritional and metabolic disease (02/17/2021), Personal history of other mental and behavioral disorders (05/27/2021), Personal history of other specified conditions (09/07/2022), Personal history of other specified conditions (10/16/2019), Personal history of other specified conditions (09/28/2022), Personal history of other specified conditions (09/16/2021), Personal history of other specified conditions (02/01/2022), Personal history of other specified conditions (03/09/2022), Personal history of other specified conditions (02/12/2014), Personal history of other specified conditions (10/27/2021), Personal history of other specified conditions (10/16/2019), Personal history of other specified conditions (02/26/2021), Personal history of other specified conditions (02/22/2021), Personal history of urinary calculi, Polycystic ovary syndrome, Postural orthostatic tachycardia syndrome (POTS), Rash and other nonspecific skin eruption (03/15/2022), Repeated falls (06/23/2021), Right lower quadrant pain (10/14/2020), Seizures (Multi), Sjogren syndrome, unspecified (Multi), Sjogren's syndrome, Sleep apnea, Slow transit constipation (07/09/2020), Subarachnoid hemorrhage, traumatic (Multi) (04/19/2023), Thyroid nodule, Traumatic subarachnoid hemorrhage with loss of consciousness of unspecified duration, subsequent encounter (03/15/2022), Traumatic subarachnoid hemorrhage without loss of consciousness, subsequent encounter, Type 2 diabetes mellitus, Unspecified disorder of refraction (10/07/2022), Unspecified optic neuritis (11/06/2020), Unspecified optic  neuritis (11/06/2020), Unspecified visual loss (09/25/2019), Varicella (As a child), Venous insufficiency (chronic) (peripheral) (10/18/2021), Viral infection, unspecified (01/11/2022), Vitamin D deficiency, and Vitamin D deficiency, unspecified (09/28/2022).    Family History: reviewed and not pertinent to chief complaint  Medications: please refer to medication reconciliation  Allergies: please refer to patient allergy list    Physical Exam  Base Eye Exam       Visual Acuity (Abdelrahman Cards)         Right Left    Near sc 20/30-1 PHNI 20/60-2 PHNI      Correction: Glasses   +2.50 readers             Tonometry (Tonopen, 12:41 AM)         Right Left    Pressure 16 14              Pupils         Dark Light Shape React APD    Right 7 6 Round Sluggish None    Left 7 6 Round Sluggish None              Visual Fields         Left Right                                Extraocular Movement         Right Left     Full Full              Neuro/Psych       Oriented x3: Yes    Mood/Affect: Normal              Dilation       Both eyes: 1% Mydriacyl & 2.5% Cuco  @ 12:42 AM                  Additional Tests       Color         Right Left    Ishihara 2/11 6/11                  Slit Lamp and Fundus Exam       External Exam         Right Left    External Normal Normal              Slit Lamp Exam         Right Left    Lids/Lashes Normal Normal    Conjunctiva/Sclera White and quiet White and quiet    Cornea Clear Clear    Anterior Chamber Deep and quiet Deep and quiet    Iris Round and reactive Round and reactive    Lens Clear Clear    Anterior Vitreous Normal Normal              Fundus Exam         Right Left    Disc Pallor temporally, no disc edema Pallor, no disc edema    C/D Ratio 0.15 0.15    Macula Normal Normal    Vessels Normal Normal    Periphery Normal Normal                    Imaging  CT HEAD WO CONTRAST 3/8/2025  IMPRESSION:  1. No acute intracranial hemorrhage or mass effect.  2. Similar positioning of the right frontal approach  ventriculostomy  catheter with associated gliosis/edema. Similar size and morphology  of the ventricles.    XR SHUNT SERIES 3/8/2025  IMPRESSION:  1.  Right ventriculostomy shunt catheter, as described above. No  discontinuity or kinking of the shunt catheter.  2.  Enlarged cardiac silhouette with possible pulmonary vascular  congestion, unchanged. No focal infiltrate or pneumothorax.  3.  Nonobstructive bowel gas pattern. Significant colonic stool  burden suggestive of constipation.    Assessment/Plan    #IIH  #Papilledema rule-out  - No papilledema on today's exam, vision is stable OD and slightly worse OS (although with inconsistent effort OS on testing x 2); otherwise no APD, no motility deficits, color deficits stable OU  - CT head, which I personally reviewed, did not show any lesion or ventriculomegaly  - Please note that the absence of papilledema does not rule out elevated ICP  - If warranted, further workup should be performed to evaluate for elevated ICP  - Rest of workup per primary team    Outpatient follow-up with neuro-ophthalmology scheduled 4/4/2025.    Tobias Regalado MD  Ophthalmology PGY-2    Ophthalmology Adult Pager - [phone]  Ophthalmology Pediatrics Pager - [phone]     For adult follow-up appointments, call: [phone]  For pediatric follow-up appointments, call: [phone]    Note finalized upon attending signature.

## 2025-03-10 ENCOUNTER — PATIENT OUTREACH (OUTPATIENT)
Dept: PRIMARY CARE | Facility: CLINIC | Age: 50
End: 2025-03-10
Payer: MEDICAID

## 2025-03-10 VITALS
HEART RATE: 96 BPM | WEIGHT: 240.3 LBS | OXYGEN SATURATION: 97 % | HEIGHT: 62 IN | RESPIRATION RATE: 16 BRPM | SYSTOLIC BLOOD PRESSURE: 114 MMHG | DIASTOLIC BLOOD PRESSURE: 80 MMHG | BODY MASS INDEX: 44.22 KG/M2 | TEMPERATURE: 97.2 F

## 2025-03-10 LAB
GLUCOSE BLD MANUAL STRIP-MCNC: 130 MG/DL (ref 74–99)
GLUCOSE BLD MANUAL STRIP-MCNC: 147 MG/DL (ref 74–99)
GLUCOSE BLD MANUAL STRIP-MCNC: 239 MG/DL (ref 74–99)

## 2025-03-10 PROCEDURE — 82947 ASSAY GLUCOSE BLOOD QUANT: CPT

## 2025-03-10 PROCEDURE — G0378 HOSPITAL OBSERVATION PER HR: HCPCS

## 2025-03-10 PROCEDURE — 2500000005 HC RX 250 GENERAL PHARMACY W/O HCPCS: Mod: SE

## 2025-03-10 PROCEDURE — 2500000004 HC RX 250 GENERAL PHARMACY W/ HCPCS (ALT 636 FOR OP/ED): Mod: SE | Performed by: NURSE PRACTITIONER

## 2025-03-10 PROCEDURE — 2500000002 HC RX 250 W HCPCS SELF ADMINISTERED DRUGS (ALT 637 FOR MEDICARE OP, ALT 636 FOR OP/ED): Mod: SE | Performed by: NURSE PRACTITIONER

## 2025-03-10 PROCEDURE — 96375 TX/PRO/DX INJ NEW DRUG ADDON: CPT

## 2025-03-10 PROCEDURE — 2500000002 HC RX 250 W HCPCS SELF ADMINISTERED DRUGS (ALT 637 FOR MEDICARE OP, ALT 636 FOR OP/ED): Mod: SE

## 2025-03-10 PROCEDURE — 2500000001 HC RX 250 WO HCPCS SELF ADMINISTERED DRUGS (ALT 637 FOR MEDICARE OP): Mod: SE

## 2025-03-10 RX ORDER — IPRATROPIUM BROMIDE AND ALBUTEROL SULFATE 2.5; .5 MG/3ML; MG/3ML
3 SOLUTION RESPIRATORY (INHALATION) EVERY 6 HOURS PRN
Qty: 180 ML | Refills: 0 | Status: SHIPPED | OUTPATIENT
Start: 2025-03-10

## 2025-03-10 RX ORDER — FLUTICASONE PROPIONATE 50 MCG
SPRAY, SUSPENSION (ML) NASAL
Qty: 16 G | Refills: 0 | Status: SHIPPED | OUTPATIENT
Start: 2025-03-10

## 2025-03-10 RX ORDER — HEPARIN 100 UNIT/ML
5 SYRINGE INTRAVENOUS ONCE
Status: COMPLETED | OUTPATIENT
Start: 2025-03-10 | End: 2025-03-10

## 2025-03-10 RX ADMIN — IPRATROPIUM BROMIDE AND ALBUTEROL SULFATE 3 ML: .5; 3 SOLUTION RESPIRATORY (INHALATION) at 09:40

## 2025-03-10 RX ADMIN — INSULIN LISPRO 4 UNITS: 100 INJECTION, SOLUTION INTRAVENOUS; SUBCUTANEOUS at 11:40

## 2025-03-10 RX ADMIN — LEVOTHYROXINE SODIUM 75 MCG: 75 TABLET ORAL at 06:06

## 2025-03-10 RX ADMIN — LACOSAMIDE 300 MG: 50 TABLET, FILM COATED ORAL at 09:12

## 2025-03-10 RX ADMIN — FUROSEMIDE 20 MG: 20 TABLET ORAL at 09:14

## 2025-03-10 RX ADMIN — PROPRANOLOL HYDROCHLORIDE 60 MG: 60 CAPSULE, EXTENDED RELEASE ORAL at 09:15

## 2025-03-10 RX ADMIN — DIVALPROEX SODIUM 500 MG: 500 TABLET, FILM COATED, EXTENDED RELEASE ORAL at 09:17

## 2025-03-10 RX ADMIN — HEPARIN 500 UNITS: 100 SYRINGE at 13:02

## 2025-03-10 RX ADMIN — LOSARTAN POTASSIUM 25 MG: 25 TABLET, FILM COATED ORAL at 09:14

## 2025-03-10 RX ADMIN — LEVETIRACETAM 1500 MG: 500 TABLET, FILM COATED ORAL at 09:12

## 2025-03-10 RX ADMIN — CIPROFOLXACIN 500 MG: 500 TABLET ORAL at 09:13

## 2025-03-10 RX ADMIN — PANTOPRAZOLE SODIUM 40 MG: 40 TABLET, DELAYED RELEASE ORAL at 06:06

## 2025-03-10 RX ADMIN — EZETIMIBE 10 MG: 10 TABLET ORAL at 09:14

## 2025-03-10 RX ADMIN — CARBOXYMETHYLCELLULOSE SODIUM 2 DROP: 5 SOLUTION/ DROPS OPHTHALMIC at 09:14

## 2025-03-10 RX ADMIN — TIZANIDINE 2 MG: 2 TABLET ORAL at 09:16

## 2025-03-10 RX ADMIN — AMITRIPTYLINE HYDROCHLORIDE 100 MG: 100 TABLET ORAL at 03:55

## 2025-03-10 RX ADMIN — HYDROCORTISONE 10 MG: 10 TABLET ORAL at 09:16

## 2025-03-10 RX ADMIN — CLONAZEPAM 0.5 MG: 0.5 TABLET ORAL at 09:13

## 2025-03-10 ASSESSMENT — PAIN SCALES - GENERAL
PAINLEVEL_OUTOF10: 0 - NO PAIN
PAINLEVEL_OUTOF10: 0 - NO PAIN

## 2025-03-10 NOTE — SIGNIFICANT EVENT
No acute neurosurgical intervention or additional neuroimaging needed at this time.     HA is improving.    Patient will follow up with neurosurgery as needed. Thank you for allowing us to participate in the care of this patient. Will sign off at this time. Please page [phone] with any questions or concerns.    Tha Hart MD  PGY-2 Neurosurgery  10:07 AM

## 2025-03-10 NOTE — DISCHARGE SUMMARY
[facility] CLINICAL DECISION UNIT  DISCHARGE SUMMARY    Patient: Jonathan Ayala  MRN: [id number]  : 1975  Date of Evaluation: 3/10/2025  ED Provider: HEMAL Lu    Limitations to history: None  Independent Historian: Yes  External Records Reviewed: Yes    Subjective:  Sanket Ayala is a 49-year-old female who has undergone comprehensive diagnostic evaluation and therapeutic management in accordance with the CDU guidelines for headache. Based on Ms. Ayala's clinical response and diagnostic information during this period of observation, it has been determined that the patient will be discharged.    The acute evaluation included:  Orders Placed This Encounter   Procedures    XR shunt series    CT head wo IV contrast    CBC and Auto Differential    Comprehensive metabolic panel    Lipase    Magnesium    Adult diet Regular    Communication - Please obtain previous EKG if available    Glucose 10-70 mg/dL & CONSCIOUS- Give 15 Grams of Carbohydrates and repeat until blood glucose level reaches 100 mg/dL or greater.    Notify provider (specify parameters)    Notify provider (specify parameters)    Notify provider (specify parameters)    Inpatient consult to Neurosurgery    Inpatient consult to ophthalmology    POCT GLUCOSE    POCT GLUCOSE    POCT GLUCOSE    POCT GLUCOSE    POCT GLUCOSE    POCT GLUCOSE    POCT GLUCOSE    ECG 12 lead    Initiate Observation Send to CDU       Results for orders placed or performed during the hospital encounter of 25   CBC and Auto Differential    Collection Time: 25  9:22 PM   Result Value Ref Range    WBC 5.1 4.4 - 11.3 x10*3/uL    nRBC 0.0 0.0 - 0.0 /100 WBCs    RBC 3.82 (L) 4.00 - 5.20 x10*6/uL    Hemoglobin 10.4 (L) 12.0 - 16.0 g/dL    Hematocrit 32.1 (L) 36.0 - 46.0 %    MCV 84 80 - 100 fL    MCH 27.2 26.0 - 34.0 pg    MCHC 32.4 32.0 - 36.0 g/dL    RDW 17.4 (H) 11.5 - 14.5 %    Platelets 244 150 - 450 x10*3/uL    Neutrophils % 58.8 40.0 - 80.0 %    Immature  Granulocytes %, Automated 0.4 0.0 - 0.9 %    Lymphocytes % 34.5 13.0 - 44.0 %    Monocytes % 5.3 2.0 - 10.0 %    Eosinophils % 0.6 0.0 - 6.0 %    Basophils % 0.4 0.0 - 2.0 %    Neutrophils Absolute 3.00 1.20 - 7.70 x10*3/uL    Immature Granulocytes Absolute, Automated 0.02 0.00 - 0.70 x10*3/uL    Lymphocytes Absolute 1.76 1.20 - 4.80 x10*3/uL    Monocytes Absolute 0.27 0.10 - 1.00 x10*3/uL    Eosinophils Absolute 0.03 0.00 - 0.70 x10*3/uL    Basophils Absolute 0.02 0.00 - 0.10 x10*3/uL   Comprehensive metabolic panel    Collection Time: 03/08/25  9:22 PM   Result Value Ref Range    Glucose 123 (H) 74 - 99 mg/dL    Sodium 143 136 - 145 mmol/L    Potassium 3.2 (L) 3.5 - 5.3 mmol/L    Chloride 109 (H) 98 - 107 mmol/L    Bicarbonate 25 21 - 32 mmol/L    Anion Gap 12 10 - 20 mmol/L    Urea Nitrogen 19 6 - 23 mg/dL    Creatinine 0.68 0.50 - 1.05 mg/dL    eGFR >90 >60 mL/min/1.73m*2    Calcium 7.8 (L) 8.6 - 10.6 mg/dL    Albumin 3.3 (L) 3.4 - 5.0 g/dL    Alkaline Phosphatase 70 33 - 110 U/L    Total Protein 6.3 (L) 6.4 - 8.2 g/dL    AST 19 9 - 39 U/L    Bilirubin, Total 0.2 0.0 - 1.2 mg/dL    ALT 27 7 - 45 U/L   Lipase    Collection Time: 03/08/25  9:22 PM   Result Value Ref Range    Lipase 40 9 - 82 U/L   Magnesium    Collection Time: 03/09/25  5:49 AM   Result Value Ref Range    Magnesium 2.18 1.60 - 2.40 mg/dL   POCT GLUCOSE    Collection Time: 03/09/25  7:34 AM   Result Value Ref Range    POCT Glucose 143 (H) 74 - 99 mg/dL   POCT GLUCOSE    Collection Time: 03/09/25 11:08 AM   Result Value Ref Range    POCT Glucose 249 (H) 74 - 99 mg/dL   POCT GLUCOSE    Collection Time: 03/09/25  4:02 PM   Result Value Ref Range    POCT Glucose 204 (H) 74 - 99 mg/dL   POCT GLUCOSE    Collection Time: 03/09/25  7:52 PM   Result Value Ref Range    POCT Glucose 302 (H) 74 - 99 mg/dL   POCT GLUCOSE    Collection Time: 03/09/25 11:43 PM   Result Value Ref Range    POCT Glucose 232 (H) 74 - 99 mg/dL   POCT GLUCOSE    Collection Time:  03/10/25  3:54 AM   Result Value Ref Range    POCT Glucose 147 (H) 74 - 99 mg/dL   POCT GLUCOSE    Collection Time: 03/10/25  7:58 AM   Result Value Ref Range    POCT Glucose 130 (H) 74 - 99 mg/dL     *Note: Due to a large number of results and/or encounters for the requested time period, some results have not been displayed. A complete set of results can be found in Results Review.       Past History     Past Medical History:   Diagnosis Date    Abnormal findings on diagnostic imaging of other abdominal regions, including retroperitoneum 10/14/2020    Abnormal CT of the abdomen    Acquired deformity of nose 03/24/2022    Nasal deformity    Acute upper respiratory infection, unspecified 10/16/2019    Acute URI    Adrenal disease (Multi)     Allergic     Allergy status to unspecified drugs, medicaments and biological substances 05/22/2020    History of drug allergy    Allergy status to unspecified drugs, medicaments and biological substances 11/13/2020    History of adverse drug reaction    Anemia     Anxiety 2005    Asthma     Benign intracranial hypertension 01/27/2022    Pseudotumor cerebri    Bipolar disorder, unspecified (Multi)     Bipolar disease, chronic    Breast calcification, right 08/21/2018    Cellulitis of abdominal wall 09/28/2022    Cellulitis of right abdominal wall    Cervicalgia 07/01/2020    Cervicalgia of mokqshqr-[id number]-qdiim region    Chronic maxillary sinusitis 01/04/2022    Chronic maxillary sinusitis    Chronic sialoadenitis 03/16/2020    Chronic sialoadenitis    COVID-19 01/06/2022    COVID-19 with multiple comorbidities    Decreased white blood cell count, unspecified 11/04/2019    Leukopenia    Disease of thyroid gland     Disturbances of salivary secretion 03/16/2020    Xerostomia    Dry eye syndrome of bilateral lacrimal glands 10/07/2022    Dry eyes, bilateral    Encounter for preprocedural cardiovascular examination 02/01/2022    Preoperative cardiovascular examination    Food  additives allergy status 06/11/2020    Allergy to food dye    Fracture of nasal bones, initial encounter for closed fracture 03/03/2022    Closed fracture of nasal bone, initial encounter    GERD (gastroesophageal reflux disease) 15 years old    Granuloma of right orbit 10/07/2021    Inflammatory pseudotumor of right orbit    History of endometrial ablation 11/09/2017    Hyperlipidemia     Hypertension     Hyperthyroidism     Hypoglycemia     Hypothyroidism     Immunocompromised     Localized swelling, mass and lump, head 03/24/2022    Swollen nose    Major depressive disorder, recurrent, in full remission ([facility]) 10/07/2021    Depression, major, recurrent, in complete remission    Mammary duct ectasia of left breast 08/24/2022    Periductal mastitis of left breast    Meningitis ([facility]) 2008    Migraine     Nipple discharge 08/24/2022    Bloody discharge from left nipple    Ocular pain, right eye 10/07/2022    Pain in right eye    Optic atrophy     Other abnormal and inconclusive findings on diagnostic imaging of breast 07/06/2020    Other abnormal and inconclusive findings on diagnostic imaging of breast    Other anomalies of pupillary function 05/31/2019    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    Other chest pain 05/18/2020    Chest discomfort    Other conditions influencing health status 08/01/2022    History of cough    Other conditions influencing health status 08/03/2021    Chronic migraine    Other specified disorders of eustachian tube, left ear 11/18/2019    ETD (Eustachian tube dysfunction), left    Other specified disorders of nose and nasal sinuses 03/24/2022    Nasal dryness    Other specified disorders of nose and nasal sinuses 03/24/2022    Nasal crusting    Pelvic and perineal pain 07/06/2020    Pelvic pain    Personal history of other diseases of the circulatory system 04/07/2020    History of sinus tachycardia    Personal history of other diseases of the circulatory system 04/07/2020     History of abnormal electrocardiography    Personal history of other diseases of the circulatory system     History of Raynaud's syndrome    Personal history of other diseases of the digestive system     History of irritable bowel syndrome    Personal history of other diseases of the digestive system 03/02/2020    History of oral pain    Personal history of other diseases of the musculoskeletal system and connective tissue 01/19/2022    History of neck pain    Personal history of other diseases of the musculoskeletal system and connective tissue 03/02/2021    History of scleroderma    Personal history of other diseases of the musculoskeletal system and connective tissue 06/16/2020    History of muscle weakness    Personal history of other diseases of the nervous system and sense organs 11/18/2019    History of hearing loss    Personal history of other diseases of the nervous system and sense organs 09/21/2022    History of partial seizures    Personal history of other diseases of the respiratory system 04/14/2021    History of asthma    Personal history of other endocrine, nutritional and metabolic disease 02/17/2021    History of diabetes mellitus    Personal history of other mental and behavioral disorders 05/27/2021    History of anxiety    Personal history of other specified conditions 09/07/2022    History of nipple discharge    Personal history of other specified conditions 10/16/2019    History of headache    Personal history of other specified conditions 09/28/2022    History of lump of left breast    Personal history of other specified conditions 09/16/2021    History of persistent cough    Personal history of other specified conditions 02/01/2022    History of palpitations    Personal history of other specified conditions 03/09/2022    History of headache    Personal history of other specified conditions 02/12/2014    History of chest pain    Personal history of other specified conditions 10/27/2021     History of nausea and vomiting    Personal history of other specified conditions 10/16/2019    History of fatigue    Personal history of other specified conditions 02/26/2021    History of orthopnea    Personal history of other specified conditions 02/22/2021    History of shortness of breath    Personal history of urinary calculi     H/O renal calculi    Polycystic ovary syndrome     Postural orthostatic tachycardia syndrome (POTS)     POTS (postural orthostatic tachycardia syndrome)    Rash and other nonspecific skin eruption 03/15/2022    Rash    Repeated falls 06/23/2021    Recurrent falls    Right lower quadrant pain 10/14/2020    Abdominal pain, RLQ (right lower quadrant)    Seizures (Multi)     Sjogren syndrome, unspecified (Multi)     History of Sjogren's disease    Sjogren's syndrome     Sleep apnea     Slow transit constipation 07/09/2020    Slow transit constipation    Subarachnoid hemorrhage, traumatic (Multi) 04/19/2023    Thyroid nodule     Traumatic subarachnoid hemorrhage with loss of consciousness of unspecified duration, subsequent encounter 03/15/2022    Subarachnoid hemorrhage following injury, with loss of consciousness, subsequent encounter    Traumatic subarachnoid hemorrhage without loss of consciousness, subsequent encounter     Subarachnoid hemorrhage following injury, no loss of consciousness, subsequent encounter    Type 2 diabetes mellitus     Unspecified disorder of refraction 10/07/2022    Refractive error    Unspecified optic neuritis 11/06/2020    Right optic neuritis    Unspecified optic neuritis 11/06/2020    Optic neuritis, right    Unspecified visual loss 09/25/2019    Vision loss    Varicella As a child    Venous insufficiency (chronic) (peripheral) 10/18/2021    Chronic venous insufficiency of lower extremity    Viral infection, unspecified 01/11/2022    Nonspecific syndrome suggestive of viral illness    Vitamin D deficiency     Vitamin D deficiency, unspecified 09/28/2022     Vitamin D deficiency     Past Surgical History:   Procedure Laterality Date    APPENDECTOMY  2017    BRAIN SURGERY  [place] placement to measure pressure    CARDIAC CATHETERIZATION      CHOLECYSTECTOMY      ENDOMETRIAL ABLATION      FRACTURE SURGERY  12/2023    HERNIA REPAIR Right 03/01/2024    with mesh    HYSTERECTOMY  2017    MR HEAD ANGIO WO IV CONTRAST  02/08/2021    MR HEAD ANGIO WO IV CONTRAST 2/8/2021 [facility]ICAL [facility]GAC[facility]    MR NECK ANGIO WO IV CONTRAST  02/08/2021    MR NECK ANGIO WO IV CONTRAST 2/8/2021 [facility] CLINICAL LEGACY    OTHER SURGICAL HISTORY  08/22/2019    Carpal tunnel surgery    OTHER SURGICAL HISTORY  08/22/2019    Hysterectomy    OTHER SURGICAL HISTORY  08/22/2019    Venous access port placement    OTHER SURGICAL HISTORY  08/22/2019    Cholecystectomy    OTHER SURGICAL HISTORY  08/22/2019    Appendectomy    OTHER SURGICAL HISTORY  08/22/2019    Pyloroplasty    WISDOM TOOTH EXTRACTION  2004     Social History     Socioeconomic History    Marital status:    Tobacco Use    Smoking status: Never    Smokeless tobacco: Never   Vaping Use    Vaping status: Never Used   Substance and Sexual Activity    Alcohol use: Not Currently     Comment: Socially in College    Drug use: Yes     Types: Benzodiazepines    Sexual activity: Not Currently     Partners: Male     Birth control/protection: Abstinence, Surgical     Comment: Partial Hysterectomy     Social Drivers of Health     Financial Resource Strain: Medium Risk (2/17/2025)    Overall Financial Resource Strain (CARDIA)     Difficulty of Paying Living Expenses: Somewhat hard   Food Insecurity: Food Insecurity Present (2/17/2025)    Hunger Vital Sign     Worried About Running Out of Food in the Last Year: Often true     Ran Out of Food in the Last Year: Sometimes true   Transportation Needs: No Transportation Needs (2/17/2025)    PRAPARE - Transportation     Lack of Transportation (Medical): No     Lack of Transportation (Non-Medical): No   Recent  Concern: Transportation Needs - High Risk (12/19/2024)    Received from [facility] SDOH Screening     Do you have transportation to your doctor's appointment?: Yes   Physical Activity: Inactive (7/28/2021)    Received from [facility]    Exercise Vital Sign     Days of Exercise per Week: 0 days     Minutes of Exercise per Session: 0 min   Stress: Stress Concern Present (7/28/2021)    Received from [facility]    [employer] - Occupational Stress Questionnaire     Feeling of Stress : To some extent   Social Connections: Moderately Integrated (7/28/2021)    Received from [facility]    Social Connection and Isolation Panel [NHANES]     Frequency of Communication with Friends and Family: Three times a week     Frequency of Social Gatherings with Friends and Family: Twice a week     Attends [employer] Services: 1 to 4 times per year     Active Member of Clubs or Organizations: No     Attends Club or Organization Meetings: Never     Marital Status:    Intimate Partner Violence: Not At Risk (2/17/2025)    Humiliation, Afraid, Rape, and Kick questionnaire     Fear of Current or Ex-Partner: No     Emotionally Abused: No     Physically Abused: No     Sexually Abused: No   Housing Stability: High Risk (2/17/2025)    Housing Stability Vital Sign     Unable to Pay for Housing in the Last Year: Yes     Number of Times Moved in the Last Year: 0     Homeless in the Last Year: No         Medications/Allergies     Current Discharge Medication List        CONTINUE these medications which have NOT CHANGED    Details   acetaminophen (Tylenol) 325 mg tablet Take 1-2 tablets (325-650 mg) by mouth every 6 hours if needed for mild pain (1 - 3). Days of infusions      Advair -21 mcg/actuation inhaler INHALE TWO PUFFS  BY MOUTH AS INSTRUCTED TWO TIMES A DAY.      amitriptyline (Elavil) 100 mg tablet Take 1 tablet (100 mg) by mouth once daily at bedtime.  Qty: 30 tablet, Refills: 11    Associated Diagnoses: Insomnia due to medical condition      azelastine (Astelin) 137 mcg (0.1 %) nasal spray Administer 1 spray into each nostril 2 times a day. Use in each nostril as directed      blood-glucose sensor (DEXCOM G7 SENSOR MISC) Use to check blood sugar continuously throughout the day as directed. Change sensor every 10 days.      calcium-vitamin D3-vitamin K (Viactiv) 650 mg-12.5 mcg-40 mcg chewable tablet Chew 2 tablets once daily. At lunch      carboxymethylcellulose (Refresh Celluvisc) 1 % ophthalmic solution dropperette Administer 2 drops into both eyes 3 times a day as needed for dry eyes.      cholecalciferol (Vitamin D-3) 5,000 Units tablet Take 1 tablet (5,000 Units) by mouth once daily.      clonazePAM (KlonoPIN) 0.5 mg tablet Take 1 tablet (0.5 mg) by mouth 2 times a day.      Dexcom G7  misc Use as instructed to check blood sugars continuously throughout the day      divalproex (Depakote ER) 500 mg 24 hr tablet Take 1 tablet (500 mg) by mouth 2 times a day.      EPINEPHrine 0.3 mg/0.3 mL injection syringe INJECT INTRAMUSCULARLY ONCE AS NEEDED FOR ANAPHYLAXIS  Qty: 2 each, Refills: 0    Associated Diagnoses: CVID (common variable immunodeficiency)      ezetimibe (Zetia) 10 mg tablet Take 1 tablet (10 mg) by mouth once daily.  Qty: 30 tablet, Refills: 11    Associated Diagnoses: Type 2 diabetes mellitus with diabetic autonomic neuropathy, with long-term current use of insulin; Thyroid nodule; Adrenal nodule      fluconazole (Diflucan) 150 mg tablet 1 tablet by mouth daily spread 72 hours apart  Qty: 2 tablet, Refills: 6    Associated Diagnoses: Vaginal moniliasis      fluticasone (Flonase) 50 mcg/actuation nasal spray INSTILL ONE SPRAY INTO EACH NOSTRIL ONCE DAILY  Qty: 16 g, Refills: 0    Associated Diagnoses:  Perennial allergic rhinitis with seasonal variation      folic acid (Folvite) 1 mg tablet Take 1 tablet (1 mg) by mouth once daily.  Qty: 90 tablet, Refills: 3    Associated Diagnoses: Irritable bowel syndrome with diarrhea      furosemide (Lasix) 20 mg tablet Take 1 tablet (20 mg) by mouth 2 times a day.  Qty: 180 tablet, Refills: 3    Associated Diagnoses: Benign intracranial hypertension      gloves, latex with aloe vera misc Large size gloves  Qty: 200 each, Refills: 3    Associated Diagnoses: Female stress incontinence      glucagon (Baqsimi) 3 mg/actuation spray,non-aerosol USE ONE SPRAY IN THE NOSE AS NEEDED FOR LOW BLOOD SUGAR  MAY REPEAT AFTER 15 MINUTES USING A NEW DEVICE IF NO RESPONSE  Qty: 1 each, Refills: 3    Associated Diagnoses: Type 2 diabetes mellitus with diabetic autonomic neuropathy, with long-term current use of insulin      glucagon (Glucagen) 1 mg injection Inject 1 mg under the skin 1 time if needed for low blood sugar - see comments (hypoglycemia).  Qty: 1 each, Refills: 12    Associated Diagnoses: Type 2 diabetes mellitus with hyperglycemia, with long-term current use of insulin      hydrocortisone (Cortef) 10 mg tablet Take 1 tablet (10 mg) by mouth 2 times a day. Double the dose if sick for three days  Qty: 70 tablet, Refills: 11    Associated Diagnoses: Adrenal insufficiency (Multi)      immune globulin, human, (Gammagard) infusion Infuse 600 mL (60 g) into a venous catheter every 14 (fourteen) days.      insulin glargine (Toujeo Max Solostar- 2 unit dial) 300 unit/mL (3 mL) injection Inject 54 units under the skin every morning  Qty: 6 mL, Refills: 6    Associated Diagnoses: Type 2 diabetes mellitus with diabetic autonomic neuropathy, with long-term current use of insulin      insulin lispro (HumaLOG KwikPen Insulin) 100 unit/mL injection Use as directed to give up to 100 units a day  Qty: 90 mL, Refills: 3    Associated Diagnoses: Type 2 diabetes mellitus with diabetic autonomic  neuropathy, with long-term current use of insulin      lacosamide (Vimpat) 150 mg tablet tablet Take 2 tablets (300 mg) by mouth 2 times a day.  Qty: 120 tablet, Refills: 1    Associated Diagnoses: Idiopathic intracranial hypertension      lasmiditan (Reyvow) 100 mg tablet Take 1 tablet (100 mg) by mouth if needed for migraine. Do not drive in 8 hours of taking this medicine. Maximum 1 dose per 24 hours.  Qty: 9 tablet, Refills: 5    Comments: Failed naratriptan and sumatriptan times 1 month. Ineffective  Associated Diagnoses: Chronic migraine without aura, intractable, with status migrainosus      levETIRAcetam (Keppra) 750 mg tablet Take 2 tablets (1,500 mg) by mouth 2 times a day.  Qty: 120 tablet, Refills: 1    Associated Diagnoses: Idiopathic intracranial hypertension      levothyroxine (Synthroid, Levoxyl) 50 mcg tablet TAKE 1 & 1/2 TABLETS (75 MCG) BY MOUTH ONCE DAILY IN THE MORNING. TAKE BEFORE MEALS.  Qty: 45 tablet, Refills: 1    Associated Diagnoses: Thyroid nodule      losartan (Cozaar) 25 mg tablet Take 1 tablet (25 mg) by mouth once daily.  Qty: 100 tablet, Refills: 3    Associated Diagnoses: Benign essential hypertension      methylPREDNISolone (Medrol) 4 mg tablet 8 tabs daily 5 days , 7 tabs daily 5 days , 6 tabs daily 5 days , 5 tabs daily 5 days , 4 tabs daily 5 days , 3 tablets daily 5 days 2 tabs daily 5 days , 1 tab daily 5 days per [facility].      miconazole (Micotin) 2 % cream Apply 1 Application topically once daily as needed (rash).      miconazole (Micotin) 2 % powder Apply to groin , under the breast and skin folds daily      moisturizing mouth (Biotene Oral Dry Mouth) solution Take 1 spray by mouth 4 times a day as needed.      montelukast (Singulair) 10 mg tablet TAKE ONE TABLET BY MOUTH EVERY DAY AT BEDTIME  Qty: 90 tablet, Refills: 0    Associated Diagnoses: Moderate persistent allergic asthma ([facility])      Motegrity 2 mg tablet Take 1 tablet (2 mg) by mouth  "once daily.      nasal spray Nayzilam 5 mg/spray (0.1 mL) spray,non-aerosol Administer into affected nostril(s) 1 time.      ondansetron ODT (Zofran-ODT) 8 mg disintegrating tablet Dissolve 1 tablet (8 mg) in the mouth every 8 hours if needed for nausea or vomiting.  Qty: 20 tablet, Refills: 0    Comments: Meds to beds  Associated Diagnoses: Nausea      OneTouch Delica Plus Lancet 33 gauge misc USE 1 LANCET TO CHECK GLUCOSE ONCE DAILY AS DIRECTED      OneTouch Verio test strips strip USE 1 STRIP TO CHECK GLUCOSE ONCE DAILY AS DIRECTED      pantoprazole (Protonix) 40 mg EC tablet Take 1 tablet (40 mg) by mouth 2 times a day before meals.      pen needle, diabetic (BD Lela 2nd Gen Pen Needle) 32 gauge x 5/32\" needle Use as directed with insulin pen up to 5 times daily  Qty: 500 each, Refills: 2    Associated Diagnoses: Type 2 diabetes mellitus with hyperglycemia, with long-term current use of insulin      polyethylene glycol (Glycolax, Miralax) 17 gram/dose powder Mix 17 g of powder and drink 3 times a day as needed for constipation.      propranolol LA (Inderal LA) 60 mg 24 hr capsule Take 1 capsule (60 mg) by mouth once daily. Do not crush, chew, or split.  Qty: 30 capsule, Refills: 11    Associated Diagnoses: Analgesic rebound headache      rOPINIRole (Requip) 0.5 mg tablet Take 1 tablet by mouth (0.5mg) in the morning and 2 tablets (1mg) by mouth in the evening      senna 8.6 mg tablet Take 1 tablet (8.6 mg) by mouth once daily at bedtime.      tiZANidine (Zanaflex) 2 mg tablet Take 1 tablet (2 mg) by mouth 2 times a day.      ubrogepant (Ubrelvy) 100 mg tablet tablet Take 1 tablet (100 mg) by mouth if needed (migraine). May repeat in 2 hours for max of 200mg per 24 hours.  Qty: 16 tablet, Refills: 3    Comments: Please assist with PA. Has failed sumatriptan, naratriptan and Nurtec. See notes and My chart messages for evidence.  Associated Diagnoses: Chronic migraine without aura, intractable, with status " migrainosus      ZINC ORAL Take 50 mg by mouth once daily.           Allergies   Allergen Reactions    Acetazolamide Hives, Rash, Shortness of breath and Swelling     oral hives, redness, ABD pain    Atorvastatin Unknown     Causes muscle pain    Cefdinir Itching, Rash, Shortness of breath and Anaphylaxis     Itchy throat    Throat closing / difficulty breathing.  Took Benadryl at home.    Itchy throat      Throat closing / difficulty breathing.  Took Benadryl at home.    Ceftriaxone Anaphylaxis, Rash, Shortness of breath and Swelling     SOB    SOB hives turned red during gallbladder    Doxepin Anaphylaxis, Hives, Swelling, Angioedema and Rash     Itchy sore throat problems with swallowing    facial swelling    Itchy sore throat problems with swallowing      facial swelling    Duloxetine Anaphylaxis, Hallucinations and Rash     suicidal ideation    suicidal ideation     Other reaction(s): suicidal ideation    suicidal    Suicidal ideation    Fd And C Red No.40 Anaphylaxis    Levofloxacin Angioedema, Swelling and Rash     eyelid swelling    eyelid swelling. Patient tolerates Avelox    Levofloxacin In D5w Anaphylaxis     Moon face lips swelling just Levaquin tolerated D5W)    Nutritional Supplements Anaphylaxis     Grapes ( red dye    Ozempic [Semaglutide] Nausea/vomiting     Severe gastroparesis worsening     Prochlorperazine Anaphylaxis     HIGH Compazine involuntary muscle spasms low doses tolerated)    Red Dye Anaphylaxis    Becky Anaphylaxis and Swelling     Becky    SOB facial swelling    Rosemary Oil Anaphylaxis, Itching and Swelling     Becky  SOB facial swelling      SOB facial swelling    Strawberry Shortness of breath     White blisters in mouth      Other reaction(s): white blisters in mouth, shortness of breath    Sulfa (Sulfonamide Antibiotics) Anaphylaxis, Rash, Shortness of breath and Swelling     SOB itchy hives    Other Reaction(s): rash, SOB      SOB itchy hives    Topiramate Anaphylaxis,  "Swelling and Angioedema     eyelid swelling    Throat swelling    eyelid swelling  Throat swelling      Throat swelling      eyelid swelling    Tree Pollen-Black Reynolds Shortness of breath     Other Reaction(s): Other: See Comments      White blisters in mouth      blisters in mouth-carries an EPIPEN-\"I have gotten short of breath\"    Tree Pollen-Pecan Shortness of breath     pecans    Reynolds Shortness of breath    Aripiprazole Unknown, Fever and Other     fever and tremors    Fevers and Tremors    Tremor    Aspartame Diarrhea     Blood sugar Everett, Diarrhea    Aspartame (Bulk) Diarrhea, Nausea Only and Nausea/vomiting     Other Reaction(s): nausea, diarrhea, abdominal pain      Blood sugar Everett, Diarrhea    Fenofibrate Other     Double vision    Hydrochlorothiazide Hives, Itching and Rash     hctz removed as free-text allergy and entered as [facility] allergy,1455 10/6/2007JO      itchy hives    Hydromorphone Unknown, GI intolerance and Nausea Only     Intolerance nausea instant    Iron Hives and Rash     ichy hive ( can tolerate ferrous gluconate)    Meclizine Angioedema, Other and Swelling     eyelid swelling    Swelling of the eyelids    Eyelids and face    Metformin Other     Other reaction(s): Dyspepsia, Dyspepsia    Propoxyphene Unknown and Hives     Darvocet itchy hives    Statins-Hmg-Coa Reductase Inhibitors Unknown     Double vision    Tetracyclines Hives, Itching and Rash     sulfa    Rash itching    Itchy  rash    Thiazides Hives, Itching and Rash     itchy hives    Venlafaxine Unknown and Fever     Fevers chills    Adhesive Tape-Silicones Itching and Other    Barbiturates Unknown    Ciprofloxacin Hives    Dhe Unknown     Raynaud's disease    Diet Foods Diarrhea     Aspartame allergy only. Removes to many foods to interface.    Farxiga [Dapagliflozin] Other     Frequent yeast infections and UTI    Ferrous Sulfate Hives    Keflex [Cephalexin] Itching    L.Acidoph-L.Bulg-B.Bif-S.Therm GI intolerance    Nsaids " (Non-Steroidal Anti-Inflammatory Drug) Unknown and Nausea/vomiting    Other Unknown    Sulfonylureas Unknown    Tobramycin Unknown    Vancomycin Unknown and Hives     Niyah syndrome    Other reaction(s): Other    Red man syndrome if given fast, benadryl with.     Niyah syndrome  Other reaction(s): Other      Other reaction(s): Other      Red man syndrome if given fast, benadryl with.    Adhesive Rash    Azithromycin Hives, Itching and Rash    Betamethasone Nausea And Vomiting, Other, GI intolerance and Diarrhea     N/V/D    House Dust Rash    Metoclopramide Hcl Other    Prednisone Rash    Propoxyphene-Acetaminophen Hives and Rash    Sulfacetamide Sodium Rash and Swelling    Beyyibei-5-Cx7 Antimigraine Agents Nausea Only         None due to Raynaud's  ( Triptans cause a stroke)    Zolpidem Other and Hallucinations     Sleep walk    Other Reaction(s): insomnia and agitation      Sleep walk       Diagnostics reviewed by RAUL Lu-CNP     Labs:  Results for orders placed or performed during the hospital encounter of 03/08/25   CBC and Auto Differential    Collection Time: 03/08/25  9:22 PM   Result Value Ref Range    WBC 5.1 4.4 - 11.3 x10*3/uL    nRBC 0.0 0.0 - 0.0 /100 WBCs    RBC 3.82 (L) 4.00 - 5.20 x10*6/uL    Hemoglobin 10.4 (L) 12.0 - 16.0 g/dL    Hematocrit 32.1 (L) 36.0 - 46.0 %    MCV 84 80 - 100 fL    MCH 27.2 26.0 - 34.0 pg    MCHC 32.4 32.0 - 36.0 g/dL    RDW 17.4 (H) 11.5 - 14.5 %    Platelets 244 150 - 450 x10*3/uL    Neutrophils % 58.8 40.0 - 80.0 %    Immature Granulocytes %, Automated 0.4 0.0 - 0.9 %    Lymphocytes % 34.5 13.0 - 44.0 %    Monocytes % 5.3 2.0 - 10.0 %    Eosinophils % 0.6 0.0 - 6.0 %    Basophils % 0.4 0.0 - 2.0 %    Neutrophils Absolute 3.00 1.20 - 7.70 x10*3/uL    Immature Granulocytes Absolute, Automated 0.02 0.00 - 0.70 x10*3/uL    Lymphocytes Absolute 1.76 1.20 - 4.80 x10*3/uL    Monocytes Absolute 0.27 0.10 - 1.00 x10*3/uL    Eosinophils Absolute 0.03 0.00 - 0.70  x10*3/uL    Basophils Absolute 0.02 0.00 - 0.10 x10*3/uL   Comprehensive metabolic panel    Collection Time: 03/08/25  9:22 PM   Result Value Ref Range    Glucose 123 (H) 74 - 99 mg/dL    Sodium 143 136 - 145 mmol/L    Potassium 3.2 (L) 3.5 - 5.3 mmol/L    Chloride 109 (H) 98 - 107 mmol/L    Bicarbonate 25 21 - 32 mmol/L    Anion Gap 12 10 - 20 mmol/L    Urea Nitrogen 19 6 - 23 mg/dL    Creatinine 0.68 0.50 - 1.05 mg/dL    eGFR >90 >60 mL/min/1.73m*2    Calcium 7.8 (L) 8.6 - 10.6 mg/dL    Albumin 3.3 (L) 3.4 - 5.0 g/dL    Alkaline Phosphatase 70 33 - 110 U/L    Total Protein 6.3 (L) 6.4 - 8.2 g/dL    AST 19 9 - 39 U/L    Bilirubin, Total 0.2 0.0 - 1.2 mg/dL    ALT 27 7 - 45 U/L   Lipase    Collection Time: 03/08/25  9:22 PM   Result Value Ref Range    Lipase 40 9 - 82 U/L   Magnesium    Collection Time: 03/09/25  5:49 AM   Result Value Ref Range    Magnesium 2.18 1.60 - 2.40 mg/dL   POCT GLUCOSE    Collection Time: 03/09/25  7:34 AM   Result Value Ref Range    POCT Glucose 143 (H) 74 - 99 mg/dL   POCT GLUCOSE    Collection Time: 03/09/25 11:08 AM   Result Value Ref Range    POCT Glucose 249 (H) 74 - 99 mg/dL   POCT GLUCOSE    Collection Time: 03/09/25  4:02 PM   Result Value Ref Range    POCT Glucose 204 (H) 74 - 99 mg/dL   POCT GLUCOSE    Collection Time: 03/09/25  7:52 PM   Result Value Ref Range    POCT Glucose 302 (H) 74 - 99 mg/dL   POCT GLUCOSE    Collection Time: 03/09/25 11:43 PM   Result Value Ref Range    POCT Glucose 232 (H) 74 - 99 mg/dL   POCT GLUCOSE    Collection Time: 03/10/25  3:54 AM   Result Value Ref Range    POCT Glucose 147 (H) 74 - 99 mg/dL   POCT GLUCOSE    Collection Time: 03/10/25  7:58 AM   Result Value Ref Range    POCT Glucose 130 (H) 74 - 99 mg/dL     *Note: Due to a large number of results and/or encounters for the requested time period, some results have not been displayed. A complete set of results can be found in Results Review.     Radiographs:  CT head wo IV contrast   Final  "Result   1. No acute intracranial hemorrhage or mass effect.   2. Similar positioning of the right frontal approach ventriculostomy   catheter with associated gliosis/edema. Similar size and morphology   of the ventricles.        I personally reviewed the images/study and I agree with the findings   as stated by Chester Coffman MD. This study was interpreted at   [place], [place].        MACRO:   None.        Signed by: Stephanie Dockery 3/8/2025 9:43 PM   Dictation workstation:   [id number]      XR shunt series   Final Result   1.  Right ventriculostomy shunt catheter, as described above. No   discontinuity or kinking of the shunt catheter.   2.  Enlarged cardiac silhouette with possible pulmonary vascular   congestion, unchanged. No focal infiltrate or pneumothorax.   3.  Nonobstructive bowel gas pattern. Significant colonic stool   burden suggestive of constipation.        I personally reviewed the images/study and I agree with the findings   as stated by Chester Coffman MD. This study was interpreted at   [place], [place].        MACRO:   None        Signed by: Stephanie Dockery 3/8/2025 9:37 PM   Dictation workstation:   [id number]            Pertinent Physical Exam At Time of Discharge  Physical Exam     Visit Vitals  /63 (BP Location: Left arm, Patient Position: Lying)   Pulse 90   Temp 36.6 °C (97.9 °F) (Temporal)   Resp 16   Ht 1.575 m (5' 2\")   Wt 109 kg (240 lb 4.8 oz)   SpO2 96%   BMI 43.95 kg/m²   OB Status Hysterectomy   Smoking Status Never   BSA 2.18 m²     Physical Exam:  VS: As documented in the triage note and EMR flowsheet from this visit were reviewed.  GEN: Nontoxic-appearing white female in no acute distress.  HEENT: NCAT, PERRL. EOMI.   NECK: Supple, no lymphadenopathy  CHEST: Lungs clear to throughout, bilaterally. No wheezing, rhonchi, or rales  HEART: RRR, Normal S1, S2.  No " murmurs/rubs/gallops.  ABD: Soft, non-surgical. No guarding, rebound tenderness or palpable mass.   EXT : Moving BUE and BLE at baseline. No clubbing, cyanosis or edema.  NEURO: Alert, oriented, and appropriately interactive. No focal neurological deficits.   PSYCH: Calm and cooperative. Tyler       Consultants  1.  Neurosurgery dialed down  shunt to certas 2, able to take time for headache to improve.  No additional recommendations or interventions recommended from a neurosurgery standpoint.  If headache continues recommend consulting neurology.  See consult note for additional documentation.  2.  Ophthalmology consulted, no additional ophthalmology workup recommended follow-up with neuro-ophthalmology as scheduled. See consult note for additional documentation.       Impression and Plan  In summary, Jonathan Ayala has been cared for according to the standard [facility] Clinical Decision Unit observation protocol for Headache, unspecified.  Reviewed all documentation pertaining to encounter including laboratory studies, diagnostic results, and interventions reviewed via EMR.  Patient reevaluated, updated results and plan of care. Uneventful overnight stay.  Currently pain-free, voiced desire to go home and feels comfortable managing symptoms there.  Patient endorsed recently ran out of nebulized DuoNeb solution, refill provided and sent to preferred pharmacy. Reviewed vitals, stable and afebrile.  Physical exam as documented. Respiratory and cardiac exams unrevealing.  Reviewed this extended period of observation was specifically required to determine the need for hospitalization. Prior to discharge from observation, the final physical exam is documented above. I have reviewed the results of the labs and imaging that were performed in the ED as well as the CDU. Based on the patient's condition and test results, the patient will be discharged home with recommendations to follow-up with her  "PCP in 1 week, neurology as scheduled 3/24 at 10:40 AM, and neuro-ophthalmology as scheduled 4/4 at 3:15 PM, continue daily medication regimen as prescribed, strict precautions explained. Patient acknowledged and amenable to plan. Dr. Young is the CDU disposition attending.    Date and Time of Disposition.   Discharge: 3.10.2025 at 11:02 AM  Admit: 3.9.2025 at 4:40 AM    Discharge Diagnosis  Headache, unspecified    Issues Requiring Follow-Up  None    Discharge Meds     Your medication list        CONTINUE taking these medications        Instructions Last Dose Given Next Dose Due   Dexcom G7  misc  Generic drug: blood-glucose meter,continuous           DEXCOM G7 SENSOR MISC           gloves, latex with aloe vera misc      Large size gloves       OneTouch Delica Plus Lancet 33 gauge misc  Generic drug: lancets           pen needle, diabetic 32 gauge x 5/32\" needle  Commonly known as: BD Lela 2nd Gen Pen Needle      Use as directed with insulin pen up to 5 times daily              ASK your doctor about these medications        Instructions Last Dose Given Next Dose Due   acetaminophen 325 mg tablet  Commonly known as: Tylenol           Advair -21 mcg/actuation inhaler  Generic drug: fluticasone propion-salmeteroL           amitriptyline 100 mg tablet  Commonly known as: Elavil      Take 1 tablet (100 mg) by mouth once daily at bedtime.       azelastine 137 mcg (0.1 %) nasal spray  Commonly known as: Astelin           Baqsimi 3 mg/actuation spray,non-aerosol  Generic drug: glucagon      USE ONE SPRAY IN THE NOSE AS NEEDED FOR LOW BLOOD SUGAR  MAY REPEAT AFTER 15 MINUTES USING A NEW DEVICE IF NO RESPONSE       glucagon 1 mg injection  Commonly known as: Glucagen      Inject 1 mg under the skin 1 time if needed for low blood sugar - see comments (hypoglycemia).       carboxymethylcellulose 1 % ophthalmic solution dropperette  Commonly known as: Refresh Celluvisc           cholecalciferol 5,000 Units " tablet  Commonly known as: Vitamin D-3           divalproex 500 mg 24 hr tablet  Commonly known as: Depakote ER           EPINEPHrine 0.3 mg/0.3 mL injection syringe  Commonly known as: Epipen      INJECT INTRAMUSCULARLY ONCE AS NEEDED FOR ANAPHYLAXIS       ezetimibe 10 mg tablet  Commonly known as: Zetia      Take 1 tablet (10 mg) by mouth once daily.       fluconazole 150 mg tablet  Commonly known as: Diflucan      1 tablet by mouth daily spread 72 hours apart       fluticasone 50 mcg/actuation nasal spray  Commonly known as: Flonase      USE 1 SPRAY INTO EACH NOSTRIL ONCE DAILY.       folic acid 1 mg tablet  Commonly known as: Folvite      Take 1 tablet (1 mg) by mouth once daily.       furosemide 20 mg tablet  Commonly known as: Lasix      Take 1 tablet (20 mg) by mouth 2 times a day.       hydrocortisone 10 mg tablet  Commonly known as: Cortef      Take 1 tablet (10 mg) by mouth 2 times a day. Double the dose if sick for three days       immune globulin (human) infusion  Commonly known as: Gammagard           insulin lispro 100 unit/mL pen  Commonly known as: HumaLOG KwikPen Insulin      Use as directed to give up to 100 units a day       ipratropium-albuteroL 0.5-2.5 mg/3 mL nebulizer solution  Commonly known as: Duo-Neb           KlonoPIN 0.5 mg tablet  Generic drug: clonazePAM           lacosamide 150 mg tablet tablet  Commonly known as: Vimpat      Take 2 tablets (300 mg) by mouth 2 times a day.       levETIRAcetam 750 mg tablet  Commonly known as: Keppra      Take 2 tablets (1,500 mg) by mouth 2 times a day.       levothyroxine 50 mcg tablet  Commonly known as: Synthroid, Levoxyl      TAKE 1 & 1/2 TABLETS (75 MCG) BY MOUTH ONCE DAILY IN THE MORNING. TAKE BEFORE MEALS.       losartan 25 mg tablet  Commonly known as: Cozaar      Take 1 tablet (25 mg) by mouth once daily.       methylPREDNISolone 4 mg tablet  Commonly known as: Medrol           miconazole 2 % powder  Commonly known as: Micotin            miconazole 2 % cream  Commonly known as: Micotin           moisturizing mouth solution  Commonly known as: Biotene Oral Dry Mouth           montelukast 10 mg tablet  Commonly known as: Singulair      TAKE ONE TABLET BY MOUTH EVERY DAY AT BEDTIME       Motegrity 2 mg tablet  Generic drug: prucalopride           nasal spray Nayzilam 5 mg/spray (0.1 mL) spray,non-aerosol  Generic drug: midazolam           ondansetron ODT 8 mg disintegrating tablet  Commonly known as: Zofran-ODT      Dissolve 1 tablet (8 mg) in the mouth every 8 hours if needed for nausea or vomiting.       OneTouch Verio test strips strip  Generic drug: blood sugar diagnostic           polyethylene glycol 17 gram/dose powder  Commonly known as: Glycolax, Miralax           propranolol LA 60 mg 24 hr capsule  Commonly known as: Inderal LA      Take 1 capsule (60 mg) by mouth once daily. Do not crush, chew, or split.       Protonix 40 mg EC tablet  Generic drug: pantoprazole           Reyvow 100 mg tablet  Generic drug: lasmiditan      Take 1 tablet (100 mg) by mouth if needed for migraine. Do not drive in 8 hours of taking this medicine. Maximum 1 dose per 24 hours.       rOPINIRole 0.5 mg tablet  Commonly known as: Requip           senna 8.6 mg tablet  Generic drug: sennosides           tiZANidine 2 mg tablet  Commonly known as: Zanaflex           Toujeo Max U-300 SoloStar 300 unit/mL (3 mL) pen  Generic drug: insulin glargine      Inject 54 units under the skin every morning       ubrogepant 100 mg tablet tablet  Commonly known as: Ubrelvy      Take 1 tablet (100 mg) by mouth if needed (migraine). May repeat in 2 hours for max of 200mg per 24 hours.       Viactiv 650 mg-12.5 mcg-40 mcg chewable tablet  Generic drug: calcium-vitamin D3-vitamin K           ZINC ORAL                    Test Results Pending At Discharge  Pending Labs       No current pending labs.            Outpatient Follow-Up  Future Appointments   Date Time Provider Department Center    3/21/2025 12:30 PM [facility] ULTRASOUND [facility]   3/21/2025  1:15 PM [facility] CT 1 [facility]   4/3/2025 12:00 PM Marah Felix MD [id number] [facility]   4/4/2025  3:15 PM Rob Mederos MD PhD [id number] [facility]   5/20/2025  4:00 PM Isidoro Mensah DO [id number] [facility]   9/15/2025  8:00 AM Bernabe Romero, PhD [id number] [facility]         Jose Eduardo Garay III, MSN, CNP  ext [phone]  [facility]  Emergency Medicine/Clinical Decision Unit    Disclaimer: This note was dictated using a speech recognition program.  While an attempt was made at proof reading to minimize errors, minor errors in transcription may be present

## 2025-03-10 NOTE — PROGRESS NOTES
Discharge facility:  [facility]  Discharge diagnosis:    Admission date:  3/8/25  Discharge date: 3/9/25, after talking to pt she was dc'd on 3/10, will call pt tomorrow

## 2025-03-10 NOTE — PROGRESS NOTES
Subjective  Jonathan Ayala has been admitted to the CDU for 19.5 hours. Serial assessments of their clinical progress include:  1) Reports she has continued to have improvements in her pain, states the combo of reglan, fluids, and toradol have reduced her symptoms the most.   2) Will be ready for dc in the morning. Neurosurgery saw the patient and mentioned reaching out to neurology if headache does not improve, but state that the adjustment with the shunt will take time and likely continue to reduce her pain.     Objective  Vital signs reviewed and noted no distress. Afebrile.  Physical Exam:  General: Patient is conversant, well-appearing and well-nourished. NAD.  Head/Neck: Normocephalic, Atraumatic. Neck is supple, full ROM without lymphadenopathy.   Eyes: PERRL, EOMI without scleral icterus. Conjunctiva clear.  ENMT: Hearing grossly intact. MMM. Posterior oropharynx unremarkable. Normal phonation. No stridor, drooling or trismus.  Cardiac: Regular rate & rhythm. No MRG.   Pulmonary: CTAB with normal effort and good aeration throughout. No accessory muscle usage. No adventitious breath sounds.   Abdomen: Soft, non-tender, nonsurgical. No masses. No rebound, rigidity or guarding. Normoactive BS x 4 quadrants.  : Deferred.  MSK: Spontaneously moving all extremities without limitation.  Pulses full and equal.  Compartments soft and nontender.  Vascular: Distal pulses full and equal. No cyanosis, clubbing or pitting LE edema. Capillary refill < 2s  Skin: WWP. Intact without rashes, lesions or discoloration. Turgor is good.  Neuro: CN II-XII intact. Awake, A&Ox3. Speech is fluent. No focal deficits noted.  Psychiatric: Mood and affect appropriate for situation.    Diagnostic Evaluation:     Labs reviewed  Results for orders placed or performed during the hospital encounter of 03/08/25 (from the past 24 hours)   Magnesium   Result Value Ref Range    Magnesium 2.18 1.60 - 2.40 mg/dL   POCT GLUCOSE   Result Value Ref  Range    POCT Glucose 143 (H) 74 - 99 mg/dL   POCT GLUCOSE   Result Value Ref Range    POCT Glucose 249 (H) 74 - 99 mg/dL   POCT GLUCOSE   Result Value Ref Range    POCT Glucose 204 (H) 74 - 99 mg/dL   POCT GLUCOSE   Result Value Ref Range    POCT Glucose 302 (H) 74 - 99 mg/dL   POCT GLUCOSE   Result Value Ref Range    POCT Glucose 232 (H) 74 - 99 mg/dL     *Note: Due to a large number of results and/or encounters for the requested time period, some results have not been displayed. A complete set of results can be found in Results Review.           Imaging CT head wo IV contrast    Result Date: 3/8/2025  Interpreted By:  Stephanie Dockery  and Meghna Briggs STUDY: CT HEAD WO IV CONTRAST;  3/8/2025 8:25 pm   INDICATION: Signs/Symptoms:Shunt series workup.   COMPARISON: MR brain 02/17/2025, CT head 02/15/2025   ACCESSION NUMBER(S): [id number]   ORDERING CLINICIAN: ALBERT GARCIA   TECHNIQUE: Noncontrast axial CT images of head were obtained with coronal and sagittal reconstructed images.   FINDINGS: BRAIN PARENCHYMA:  No acute intraparenchymal hemorrhage or parenchymal evidence of acute large territory ischemic infarct. No mass-effect. Gray-white matter distinction is preserved. Similar hypodensity along the path of the right frontal approach ventriculostomy catheter compared to prior exam, likely representing edema or gliosis.   VENTRICLES and EXTRA-AXIAL SPACES: Similar positioning of right frontal approach ventriculostomy catheter with tip terminating at the right foramen of Monro. No acute extra-axial or intraventricular hemorrhage. No effacement of cerebral sulci. Ventricles and sulci are stable in size compared to prior exam.   PARANASAL SINUSES/MASTOIDS:  No hemorrhage or air-fluid levels within the visualized paranasal sinuses. The mastoids are well aerated.   CALVARIUM/ORBITS:  No skull fracture. The orbits and globes are intact to the extent visualized.   EXTRACRANIAL SOFT TISSUES: No discernible  abnormality.       1. No acute intracranial hemorrhage or mass effect. 2. Similar positioning of the right frontal approach ventriculostomy catheter with associated gliosis/edema. Similar size and morphology of the ventricles.   I personally reviewed the images/study and I agree with the findings as stated by Chester Coffman MD. This study was interpreted at [facility], [place], [place].   MACRO: None.   Signed by: Stephanie Dockery 3/8/2025 9:43 PM Dictation workstation:   [id number]    XR shunt series    Result Date: 3/8/2025  Interpreted By:  Stephanie Dockery  and Meghna Briggs STUDY: XR SHUNT SERIES;  3/8/2025 8:15 pm   INDICATION: Signs/Symptoms:Patient bumped area, check integrity.   COMPARISON: Same day CT head, shunt series radiograph 02/15/2025, MR brain 02/17/2025   ACCESSION NUMBER(S): [id number]   ORDERING CLINICIAN: ALBERT GARCIA   FINDINGS: Two views of the skull in AP and lateral projection, a single AP view of the chest and a single AP view of the abdomen and pelvis were obtained. Evaluation partially limited due to patient body habitus with overlying soft tissues.   A  right ventriculostomy shunt catheter is present. Shunt tubing is seen extending over the  right lateral skull,  right neck,  right anterior chest and is seen to coil over the left lower abdomen and terminating within the pelvic inlet. The catheter appears to terminate within the right side of the pelvic inlet compared to the left side of the pelvic inlet on prior  images of CT abdomen and pelvis 02/16/2025. No discontinuity or kinking of the shunt catheter.   Right chest wall MediPort with tip in the region of the right atrium, similar to prior imaging. Cardiomediastinal silhouette is enlarged, similar to prior imaging. Mild hilar fullness. No pulmonary consolidation, pleural effusion or pneumothorax. There are decreased lung volumes with associated bronchovascular crowding. Nonobstructive  bowel gas pattern. Significant colonic stool burden. Cholecystectomy clips. No acute osseous abnormality.       1.  Right ventriculostomy shunt catheter, as described above. No discontinuity or kinking of the shunt catheter. 2.  Enlarged cardiac silhouette with possible pulmonary vascular congestion, unchanged. No focal infiltrate or pneumothorax. 3.  Nonobstructive bowel gas pattern. Significant colonic stool burden suggestive of constipation.   I personally reviewed the images/study and I agree with the findings as stated by Chester Coffman MD. This study was interpreted at [facility], [place], [place].   MACRO: None   Signed by: Stephanie Dockery 3/8/2025 9:37 PM Dictation workstation:   [id number]    OCT, Optic Nerve - OU - Both Eyes    Result Date: 2/20/2025  Retinal multimodal imaging including photography was completed, and the findings are described in the examination.    BI US breast limited right    Result Date: 2/19/2025  * * *Final Report* * * DATE OF EXAM: Feb 19 2025  2:30PM   [facility]   [id number]  -  [facility] [employer] BREAST [employer] RT  / ACCESSION #  [id number] PROCEDURE REASON: Category 3 mammography result with short follow-up interval suggested for probab      * * * * Physician Interpretation * * * *  [place] [address] Phone: [phone] #[phone] - JENNIFER SARBJIT AGUILAR #[id number] - [facility] US BREAST [employer] RT HISTORY: 49 year-old patient seen for diagnostic evaluation of short term follow-up from a previous mammogram in the right breast. COMPARISON STUDIES: The present examination has been compared to prior imaging studies dated 09/13/2023 (MRI), 10/24/2023 (mammogram), 11/14/2023 (mammogram), 04/23/2024 (mammogram) and 04/23/2024 (ultrasound). MAMMOGRAM TECHNIQUE: The study was acquired using full field digital technology and interpreted from soft copy. Digital Breast Tomosynthesis (DBT) images were obtained and used to  assist in the interpretation of this examination. MAMMOGRAM FINDINGS: The breasts are extremely dense, which lowers the sensitivity of mammography. There are no suspicious mammographic findings to correspond with the area of the mass previously identified on ultrasound. No suspicious masses, calcifications or other abnormalities are seen in either breast. Stable benign appearing calcifications scattered in both breasts. ULTRASOUND TECHNIQUE: Targeted ultrasound of the indicated area was performed. Gray scale images were saved. ULTRASOUND FINDINGS: There is a mass measuring 0.3 x 0.3 x 0.3 cm in the right breast at 2 o'clock, 6 cm from the nipple. Internal echotexture is hypoechoic. There is posterior acoustic enhancement. Color flow imaging demonstrates vascularity is not present. This resembles a complicated cyst or an oil cyst. This is decreased in size compared to prior, compatible with benign etiology. There are no suspicious findings in the imaged area.    IMPRESSION: There is no mammographic or sonographic evidence of malignancy. Return to annual screening mammogram is recommended. Annual mammogram will be due in 1 year. BI-RADS Category 2: Benign RISK:  Based on the Tyrer-Cuzick (TC) risk assessment model, this patient has a 38.3% lifetime risk of developing breast cancer, meaning they are at high risk for developing breast cancer. However, this is only an estimate based on available history provided on the patient's questionnaire. Because patients with a lifetime risk of 20% or greater may benefit from additional supplemental screening, we encourage a full breast clinical evaluation and comprehensive breast cancer risk assessment to guide further decision making. For more information regarding the management of high-risk patients, the following is a link to the [facility] care path https://ccf.GMEX.Framebridge/dotNet/documents/?zwlyo=56715. Additionally, a referral to the [facility] Breast  [facility]inic is also appropriate. Interpreting Radiologist: Mary Anne Abdul M.D. Electronically signed on: 02/19/2025 : OSMAN Transcribe Date/Time: Feb 19 2025  2:17P Dictated by : MARY ANNE ABDUL MD This examination was interpreted and the report reviewed and electronically signed by: MARY ANNE ABDUL MD on Feb 19 2025  3:02PM  EST    BI mammo bilateral diagnostic tomosynthesis    Result Date: 2/19/2025  * * *Final Report* * * DATE OF EXAM: Feb 19 2025  1:53PM   [facility]   [id number]  -  [facility]ENN[facility]ER DIAG W IRASEMA JEFF  / ACCESSION #  [id number] PROCEDURE REASON: multiple diagnoses      * * * * Physician Interpretation * * * *  [place] [address] Phone: [phone] #[phone] - JENNIFER DIAG W IRASEMA JEFF #[id number] - JENN[facility]ER US BREAST LTD RT HISTORY: 49 year-old patient seen for diagnostic evaluation of short term follow-up from a previous mammogram in the right breast. COMPARISON STUDIES: The present examination has been compared to prior imaging studies dated 09/13/2023 (MRI), 10/24/2023 (mammogram), 11/14/2023 (mammogram), 04/23/2024 (mammogram) and 04/23/2024 (ultrasound). MAMMOGRAM TECHNIQUE: The study was acquired using full field digital technology and interpreted from soft copy. Digital Breast Tomosynthesis (DBT) images were obtained and used to assist in the interpretation of this examination. MAMMOGRAM FINDINGS: The breasts are extremely dense, which lowers the sensitivity of mammography. There are no suspicious mammographic findings to correspond with the area of the mass previously identified on ultrasound. No suspicious masses, calcifications or other abnormalities are seen in either breast. Stable benign appearing calcifications scattered in both breasts. ULTRASOUND TECHNIQUE: Targeted ultrasound of the indicated area was performed. Gray scale images were saved. ULTRASOUND FINDINGS: There is a mass measuring 0.3 x 0.3 x 0.3 cm in the right breast  at 2 o'clock, 6 cm from the nipple. Internal echotexture is hypoechoic. There is posterior acoustic enhancement. Color flow imaging demonstrates vascularity is not present. This resembles a complicated cyst or an oil cyst. This is decreased in size compared to prior, compatible with benign etiology. There are no suspicious findings in the imaged area.    IMPRESSION: There is no mammographic or sonographic evidence of malignancy. Return to annual screening mammogram is recommended. Annual mammogram will be due in 1 year. BI-RADS Category 2: Benign RISK:  Based on the Tyrer-Cuzick (TC) risk assessment model, this patient has a 38.3% lifetime risk of developing breast cancer, meaning they are at high risk for developing breast cancer. However, this is only an estimate based on available history provided on the patient's questionnaire. Because patients with a lifetime risk of 20% or greater may benefit from additional supplemental screening, we encourage a full breast clinical evaluation and comprehensive breast cancer risk assessment to guide further decision making. For more information regarding the management of high-risk patients, the following is a link to the [facility] care path https://"[employer]"f.Mazree/dotNet/documents/?ajxwc=15937. Additionally, a referral to the [facility] Breast Clinic is also appropriate. Interpreting Radiologist: Breanna Abdul M.D. Electronically signed on: 02/19/2025 : OSMAN Transcribe Date/Time: Feb 19 2025  1:20P Dictated by : BREANNA ABDUL MD This examination was interpreted and the report reviewed and electronically signed by: BREANNA ABDUL MD on Feb 19 2025  3:02PM  EST    MR brain w and wo IV contrast    Result Date: 2/18/2025  Interpreted By:  Erwin Barrett and Baker Zachary STUDY: MR BRAIN W AND WO IV CONTRAST;  2/17/2025 5:40 pm   INDICATION: Signs/Symptoms:shunt evaluation, Blitz protocol.   COMPARISON: MRI orbit/brain on  10/22/2024   ACCESSION NUMBER(S): [id number]   ORDERING CLINICIAN: CHILANGO ALVAREZ   TECHNIQUE: Multiplanar multisequence images of the brain were obtained before and after intravenous administration of 10 mmol Gadovist.   FINDINGS: Susceptibility artifact from ventricular catheter somewhat limits evaluation of the right frontoparietal region.   Postsurgical changes from right frontal approach ventricular catheter again noted with tip terminating in the region of the foramen Monro, stable in positioning compared to prior exam. Similar encephalomalacia/gliosis within the right superior frontal lobe along the course of the ventriculostomy catheter. The ventricular system is similar in size and configuration compared to prior exam with no evidence of hydrocephalus. The basal cisterns are patent. No extra-axial fluid collection. Similar generalized cerebral parenchymal volume loss.   Coronal CISS images demonstrate relatively hyperintense signal throughout the course of the catheter, suggestive of patency of the shunt. However, CSF flow/cine images demonstrate no definite flow within the shunt.   Mild nonspecific T2/FLAIR hyperintense signal throughout the periventricular white matter, which may be sequelae of chronic small-vessel ischemic disease. No abnormal areas of enhancement. No diffusion restriction abnormality to suggest acute infarct. No mass effect or midline shift.   Visualized paranasal sinuses and mastoid air cells are predominantly clear.       1. Postsurgical changes with stable positioning of right frontal approach ventricular catheter and findings suggestive of patency of the catheter. However, CSF flow/cine images demonstrate no definite flow within the shunt catheter. Stable size and configuration of the ventricular system when compared to prior exam from 02/17/2025 with no evidence of hydrocephalus. 2. Additional chronic findings as above.   I personally reviewed the images/study and I agree with the  findings as stated by Dr. Brody Daley M.D. This study was interpreted at [facility], [place], [place].   MACRO: None   Signed by: Erwin Barrett 2/18/2025 12:45 PM Dictation workstation:   [id number]    CT chest abdomen pelvis w IV contrast    Result Date: 2/16/2025  Interpreted By:  Fabio Mcclelland and Beyersdorf Conner STUDY: CT CHEST ABDOMEN PELVIS W IV CONTRAST;  2/16/2025 12:14 am   INDICATION: Signs/Symptoms:infectious work up, rule out pseudocyst.     COMPARISON: CT abdomen pelvis 01/23/2025   ACCESSION NUMBER(S): [id number]   ORDERING CLINICIAN: ANDREAS WYATT   TECHNIQUE: Contiguous axial images of the chest, abdomen, and pelvis were obtained after the intravenous administration of iodinated contrast. Coronal and sagittal reformatted images were reconstructed from the axial data. CT chest was obtained using PE angiographic protocol with MIP images created on a separate independent workstation.   FINDINGS: CT CHEST:   MEDIASTINUM AND LYMPH NODES: The esophageal wall appears within normal limits. No enlarged intrathoracic or axillary lymph nodes by imaging criteria. No pneumomediastinum.   VESSELS: Right chest MediPort tip terminates in the right atrium. 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 Normal caliber thoracic aorta without dissection. No significant aortic atherosclerosis.   HEART: Normal size. No coronary artery calcifications. No significant pericardial effusion.   LUNG, AIRWAYS, PLEURA: The large airways are clear without evidence of the endobronchial mass or significant debris. Bandlike opacities in the lingula and marginating the posterior lung bases likely represent subsegmental atelectasis. No consolidation, pulmonary edema, effusion, or pneumothorax.   CHEST WALL SOFT TISSUES: No discernible acute abnormality. The ventriculoperitoneal shunt catheter can be seen in the right lateral neck, and tracking inferiorly through the  subcutaneous tissue of the anterior chest. There is no evidence of kinking/discontinuity or surrounding fluid collection.   OSSEOUS STRUCTURES: No acute osseous abnormality.     CT ABDOMEN/PELVIS:   ABDOMINAL WALL: The ventriculoperitoneal shunt catheter contiguous through the subcutaneous tissues of the anterior abdominal wall, before entering the peritoneal cavity through the left rectus abdominis muscle. Catheter courses into the pelvis and terminates in the left perirectal fat. Again, there is no evidence of discontinuity, kinking, or surrounding fluid collection.   LIVER: No significant parenchymal abnormality.   BILE DUCTS: No significant intrahepatic or extrahepatic dilatation.   GALLBLADDER: Surgically removed.   PANCREAS: No significant abnormality.   SPLEEN: No significant abnormality.   ADRENALS: No significant abnormality.   KIDNEYS, URETERS, BLADDER:  No significant abnormality.   REPRODUCTIVE ORGANS: The uterus is surgically removed.   VESSELS: No significant abnormality.   RETROPERITONEUM/LYMPH NODES: No acute retroperitoneal abnormality. No enlarged lymph nodes.   BOWEL/PERITONEUM: Small gastric diverticulum identified on series 201, image 75. No inflammatory bowel wall thickening or dilatation. The appendix has been surgically removed. Sigmoid diverticulosis without evidence to suggest diverticulitis.   Trace amount of simple attenuating fluid layering in the deep pelvis. No pneumoperitoneum or focal fluid collections. Specifically, no collection surrounding the tip of the ventriculoperitoneal shunt catheter.     MUSCULOSKELETAL: No acute osseous abnormality. No suspicious osseous lesion.       1. No acute abnormality in the chest, abdomen, or pelvis. 2. The ventriculoperitoneal shunt catheter is intact throughout its course from the chest to the pelvis without discontinuity, kinking, or surrounding fluid collection/abscess, terminating in the left upper perirectal fat.       I personally reviewed  the image(s)/study and resident interpretation. I agree with the findings as stated by resident Martin Weston. Data analyzed and images interpreted at [place], [place].   MACRO: None.   Signed by: Fabio Mcclelland 2/16/2025 1:16 AM Dictation workstation:   [id number]    XR shunt series    Result Date: 2/15/2025  Interpreted By:  Fabio Mcclelland and Beyersdorf Conner STUDY: XR SHUNT SERIES; 2/15/2025 8:33 pm   INDICATION: Signs/Symptoms: shunt, HA.   COMPARISON: X-ray shunt series 01/20/2025   ACCESSION NUMBER(S): [id number]   ORDERING CLINICIAN: ANDREAS WYATT   FINDINGS: Two views of the skull in AP and lateral projection, a single AP view of the chest and a 2 AP views of the abdomen were obtained. A  right parietal ventriculostomy shunt catheter is present. Shunt tubing is seen extending over the  right lateral skull,  right neck,  right anterior chest, midline of the abdomen, and terminates in the right lower quadrant of the pelvis. Additionally, there is a right chest MediPort with its tip terminating in the expected location of the right atrium.   There are low lung volumes with increased bronchovascular crowding and minimal bibasilar atelectasis.. There is a nonobstructive bowel gas pattern present. Cholecystectomy clips noted. No evidence of acute fracture is identified.  The visualized paranasal sinuses are clear.       1.  Right parietal ventriculostomy shunt catheter without kinking or discontinuity, as described above. 2. No acute cardiopulmonary process or bowel obstruction.   I personally reviewed the image(s)/study and resident interpretation. I agree with the findings as stated by resident Martin Weston. Data analyzed and images interpreted at [place], [place].   MACRO: None   Signed by: Fabio Mcclelland 2/15/2025 9:11 PM Dictation workstation:   [id number]    CT head wo IV contrast    Result  Date: 2/15/2025  Interpreted By:  Fabio Mcclelland,  and Kiah Mccarthy STUDY: CT HEAD WO IV CONTRAST;  2/15/2025 8:30 pm   INDICATION: Signs/Symptoms: shunt, HA, nausea.     COMPARISON: CT head dated 01/29/2025   ACCESSION NUMBER(S): [id number]   ORDERING CLINICIAN: ANDREAS WYATT   TECHNIQUE: Noncontrast axial CT images of head were obtained with coronal and sagittal reconstructed images.   FINDINGS: BRAIN PARENCHYMA: No evidence of acute intraparenchymal hemorrhage or parenchymal evidence of an acute large territory ischemic infarct. No mass-effect, midline shift or effacement of cerebral sulci. Gray-white matter distinction is preserved. Similar appearance compared to prior of hypodensity along the path of the ventriculostomy catheter, representing edema or gliosis.   VENTRICLES and EXTRA-AXIAL SPACES: Stable positioning of right frontal approach ventriculostomy catheter with tip terminating near the right foramen of Monro. The shunt tubing is intact to the extent included in the study. No acute extra-axial or intraventricular hemorrhage. The ventricular system is unchanged in size and configuration compared to prior examination.   PARANASAL SINUSES/MASTOIDS: No hemorrhage or air-fluid levels within the visualized paranasal sinuses. The mastoids are well aerated.   CALVARIUM/ORBITS: No skull fracture. The orbits and globes are intact to the extent visualized.   EXTRACRANIAL SOFT TISSUES: No discernible abnormality.       1. No acute intracranial abnormality or calvarial fracture. 2. No significant interval change in appearance of the head compared to prior examination with unchanged position of right frontal approach ventriculostomy catheter.   I personally reviewed the images/study and I agree with the findings as stated above by resident physician, Fabio Dupont MD. This study was interpreted at [place], [place].   MACRO: None.   Signed by: Fabio Mcclelland  2/15/2025 9:10 PM Dictation workstation:   [id number]       Medications:     Scheduled medications      - amitriptyline, 100 mg, oral, Nightly  ciprofloxacin, 500 mg, oral, q12h OLESYA  clonazePAM, 0.5 mg, oral, BID  divalproex, 500 mg, oral, BID  ezetimibe, 10 mg, oral, Daily  furosemide, 20 mg, oral, BID  hydrocortisone, 10 mg, oral, BID  insulin lispro, 0-10 Units, subcutaneous, q4h  lacosamide, 300 mg, oral, BID  levETIRAcetam, 1,500 mg, oral, BID  levothyroxine, 75 mcg, oral, Daily  losartan, 25 mg, oral, Daily  lubricating eye drops, 2 drop, Both Eyes, TID  montelukast, 10 mg, oral, Nightly  pantoprazole, 40 mg, oral, BID AC  propranolol LA, 60 mg, oral, Daily  tiZANidine, 2 mg, oral, BID         Continuous medications      - lactated Ringer's, 125 mL/hr, Last Rate: 125 mL/hr (03/09/25 3449)         PRN medications      - PRN medications: dextrose, dextrose, glucagon, glucagon, ipratropium-albuteroL, ondansetron ODT, polyethylene glycol     Assessment / Plan  Jonathan SILVINA Jamie continues to be managed in accordance with the CDU clinical guidelines for headache. An update of their clinical problem list included:   1) headache   -- will continue with symptom management for headache. Likely discharge in the morning   -- consult neurology if symptoms do not improve or worsen   -- stable VS     Brittney Cnatu PA-C

## 2025-03-11 LAB
ACE SERPL-CCNC: 22 U/L (ref 9–67)
ATRIAL RATE: 100 BPM
B BURGDOR IGG+IGM SER QL IA: <=0.9 INDEX
B HENSELAE IGG SER QL: NEGATIVE
B HENSELAE IGM SER QL: NEGATIVE
ERYTHROCYTE [DISTWIDTH] IN BLOOD BY AUTOMATED COUNT: 16.5 % (ref 11–15)
FOLATE SERPL-MCNC: 20.8 NG/ML
HCT VFR BLD AUTO: 38.1 % (ref 35–45)
HGB BLD-MCNC: 11.9 G/DL (ref 11.7–15.5)
M TB IFN-G BLD-IMP: NORMAL
MCH RBC QN AUTO: 26.7 PG (ref 27–33)
MCHC RBC AUTO-ENTMCNC: 31.2 G/DL (ref 32–36)
MCV RBC AUTO: 85.4 FL (ref 80–100)
P AXIS: 41 DEGREES
P OFFSET: 192 MS
P ONSET: 133 MS
PLATELET # BLD AUTO: 265 THOUSAND/UL (ref 140–400)
PMV BLD REES-ECKER: 10.7 FL (ref 7.5–12.5)
PR INTERVAL: 180 MS
Q ONSET: 223 MS
QRS COUNT: 17 BEATS
QRS DURATION: 88 MS
QT INTERVAL: 356 MS
QTC CALCULATION(BAZETT): 459 MS
QTC FREDERICIA: 422 MS
R AXIS: -20 DEGREES
RBC # BLD AUTO: 4.46 MILLION/UL (ref 3.8–5.1)
T AXIS: 88 DEGREES
T GONDII IGG SERPL IA-ACNC: <7.2 IU/ML
T GONDII IGM SERPL QL IA: <8 AU/ML
T OFFSET: 401 MS
T PALLIDUM AB SER QL IA: NEGATIVE
VENTRICULAR RATE: 100 BPM
VIT A SERPL-MCNC: 105 MCG/DL (ref 38–98)
VIT B1 BLD-SCNC: 168 NMOL/L (ref 78–185)
VIT B12 SERPL-MCNC: 328 PG/ML (ref 200–1100)
WBC # BLD AUTO: 5.9 THOUSAND/UL (ref 3.8–10.8)

## 2025-03-12 ENCOUNTER — SPECIALTY PHARMACY (OUTPATIENT)
Dept: PHARMACY | Facility: CLINIC | Age: 50
End: 2025-03-12

## 2025-03-12 ENCOUNTER — PATIENT OUTREACH (OUTPATIENT)
Dept: PRIMARY CARE | Facility: CLINIC | Age: 50
End: 2025-03-12
Payer: MEDICAID

## 2025-03-12 ENCOUNTER — TELEPHONE (OUTPATIENT)
Dept: PRIMARY CARE | Facility: CLINIC | Age: 50
End: 2025-03-12
Payer: MEDICAID

## 2025-03-12 NOTE — PROGRESS NOTES
Discharge facility:  [facility]  Discharge diagnosis:    Admission date:  3/8/25  Discharge date: 3/10      Left message with pt to call back to complete post dc call.   No tcm appt slots in office so likely to not have appt in next 14 days. But await office if they can schedule.    Discharge Summary by Jose Eduardo Garay APRN-CNP (03/10/2025 11:02)

## 2025-03-12 NOTE — TELEPHONE ENCOUNTER
Patient case manger from [facility] called and wanted to get a verbal order from pcp for home health aid hours 16.5 hrs weekly and to inform they will be faxing over plan of care please advise

## 2025-03-17 PROCEDURE — RXMED WILLOW AMBULATORY MEDICATION CHARGE

## 2025-03-18 ENCOUNTER — PHARMACY VISIT (OUTPATIENT)
Dept: PHARMACY | Facility: CLINIC | Age: 50
End: 2025-03-18
Payer: MEDICAID

## 2025-03-21 ENCOUNTER — APPOINTMENT (OUTPATIENT)
Dept: RADIOLOGY | Facility: HOSPITAL | Age: 50
End: 2025-03-21
Payer: MEDICAID

## 2025-03-21 ENCOUNTER — PATIENT OUTREACH (OUTPATIENT)
Dept: PRIMARY CARE | Facility: CLINIC | Age: 50
End: 2025-03-21
Payer: MEDICAID

## 2025-03-21 DIAGNOSIS — Z79.4 TYPE 2 DIABETES MELLITUS WITH DIABETIC AUTONOMIC NEUROPATHY, WITH LONG-TERM CURRENT USE OF INSULIN: ICD-10-CM

## 2025-03-21 DIAGNOSIS — I10 BENIGN ESSENTIAL HYPERTENSION: ICD-10-CM

## 2025-03-21 DIAGNOSIS — E11.43 TYPE 2 DIABETES MELLITUS WITH DIABETIC AUTONOMIC NEUROPATHY, WITH LONG-TERM CURRENT USE OF INSULIN: ICD-10-CM

## 2025-03-21 NOTE — PROGRESS NOTES
Indy madison from [place] [facility] called and left a message   Need a letter of necessity for the home modification   Need last ov note and med list  Left message back as there is no fax number and to update we would need the letter of medical necessity faxed to us     Updated provider out of office next week

## 2025-03-26 ENCOUNTER — TELEPHONE (OUTPATIENT)
Dept: PRIMARY CARE | Facility: CLINIC | Age: 50
End: 2025-03-26
Payer: MEDICAID

## 2025-03-26 PROCEDURE — 99285 EMERGENCY DEPT VISIT HI MDM: CPT | Performed by: EMERGENCY MEDICINE

## 2025-03-26 ASSESSMENT — PAIN SCALES - GENERAL: PAINLEVEL_OUTOF10: 8

## 2025-03-26 ASSESSMENT — PAIN - FUNCTIONAL ASSESSMENT: PAIN_FUNCTIONAL_ASSESSMENT: 0-10

## 2025-03-26 ASSESSMENT — PAIN DESCRIPTION - LOCATION: LOCATION: HEAD

## 2025-03-26 ASSESSMENT — PAIN DESCRIPTION - PAIN TYPE: TYPE: ACUTE PAIN

## 2025-03-26 NOTE — TELEPHONE ENCOUNTER
[facility] calling to let you know patient would like to stop Home Health aid services immediately.  will take over care.

## 2025-03-27 ENCOUNTER — APPOINTMENT (OUTPATIENT)
Dept: RADIOLOGY | Facility: HOSPITAL | Age: 50
End: 2025-03-27
Payer: MEDICAID

## 2025-03-27 ENCOUNTER — HOSPITAL ENCOUNTER (EMERGENCY)
Facility: HOSPITAL | Age: 50
Discharge: HOME | End: 2025-03-27
Attending: EMERGENCY MEDICINE
Payer: MEDICAID

## 2025-03-27 VITALS
RESPIRATION RATE: 16 BRPM | TEMPERATURE: 98.6 F | OXYGEN SATURATION: 96 % | WEIGHT: 240 LBS | HEIGHT: 62 IN | BODY MASS INDEX: 44.16 KG/M2 | DIASTOLIC BLOOD PRESSURE: 83 MMHG | HEART RATE: 95 BPM | SYSTOLIC BLOOD PRESSURE: 132 MMHG

## 2025-03-27 DIAGNOSIS — G43.909 MIGRAINE WITHOUT STATUS MIGRAINOSUS, NOT INTRACTABLE, UNSPECIFIED MIGRAINE TYPE: Primary | ICD-10-CM

## 2025-03-27 LAB
ANION GAP SERPL CALC-SCNC: 13 MMOL/L (ref 10–20)
BASOPHILS # BLD AUTO: 0.03 X10*3/UL (ref 0–0.1)
BASOPHILS NFR BLD AUTO: 0.6 %
BUN SERPL-MCNC: 18 MG/DL (ref 6–23)
CALCIUM SERPL-MCNC: 9.6 MG/DL (ref 8.6–10.6)
CHLORIDE SERPL-SCNC: 101 MMOL/L (ref 98–107)
CO2 SERPL-SCNC: 27 MMOL/L (ref 21–32)
CREAT SERPL-MCNC: 0.82 MG/DL (ref 0.5–1.05)
CRP SERPL-MCNC: 0.82 MG/DL
EGFRCR SERPLBLD CKD-EPI 2021: 89 ML/MIN/1.73M*2
EOSINOPHIL # BLD AUTO: 0.04 X10*3/UL (ref 0–0.7)
EOSINOPHIL NFR BLD AUTO: 0.8 %
ERYTHROCYTE [DISTWIDTH] IN BLOOD BY AUTOMATED COUNT: 17.4 % (ref 11.5–14.5)
GLUCOSE SERPL-MCNC: 138 MG/DL (ref 74–99)
HCT VFR BLD AUTO: 37.6 % (ref 36–46)
HGB BLD-MCNC: 12.2 G/DL (ref 12–16)
IMM GRANULOCYTES # BLD AUTO: 0.03 X10*3/UL (ref 0–0.7)
IMM GRANULOCYTES NFR BLD AUTO: 0.6 % (ref 0–0.9)
LYMPHOCYTES # BLD AUTO: 2.03 X10*3/UL (ref 1.2–4.8)
LYMPHOCYTES NFR BLD AUTO: 38.7 %
MCH RBC QN AUTO: 27.3 PG (ref 26–34)
MCHC RBC AUTO-ENTMCNC: 32.4 G/DL (ref 32–36)
MCV RBC AUTO: 84 FL (ref 80–100)
MONOCYTES # BLD AUTO: 0.39 X10*3/UL (ref 0.1–1)
MONOCYTES NFR BLD AUTO: 7.4 %
NEUTROPHILS # BLD AUTO: 2.72 X10*3/UL (ref 1.2–7.7)
NEUTROPHILS NFR BLD AUTO: 51.9 %
NRBC BLD-RTO: 0 /100 WBCS (ref 0–0)
PLATELET # BLD AUTO: 302 X10*3/UL (ref 150–450)
POTASSIUM SERPL-SCNC: 3.8 MMOL/L (ref 3.5–5.3)
RBC # BLD AUTO: 4.47 X10*6/UL (ref 4–5.2)
SODIUM SERPL-SCNC: 137 MMOL/L (ref 136–145)
WBC # BLD AUTO: 5.2 X10*3/UL (ref 4.4–11.3)

## 2025-03-27 PROCEDURE — 96365 THER/PROPH/DIAG IV INF INIT: CPT

## 2025-03-27 PROCEDURE — 2500000004 HC RX 250 GENERAL PHARMACY W/ HCPCS (ALT 636 FOR OP/ED): Mod: SE

## 2025-03-27 PROCEDURE — 70450 CT HEAD/BRAIN W/O DYE: CPT | Performed by: RADIOLOGY

## 2025-03-27 PROCEDURE — 96375 TX/PRO/DX INJ NEW DRUG ADDON: CPT

## 2025-03-27 PROCEDURE — 85025 COMPLETE CBC W/AUTO DIFF WBC: CPT | Performed by: EMERGENCY MEDICINE

## 2025-03-27 PROCEDURE — 70450 CT HEAD/BRAIN W/O DYE: CPT

## 2025-03-27 PROCEDURE — 74018 RADEX ABDOMEN 1 VIEW: CPT | Mod: FOREIGN READ | Performed by: RADIOLOGY

## 2025-03-27 PROCEDURE — 86140 C-REACTIVE PROTEIN: CPT | Performed by: EMERGENCY MEDICINE

## 2025-03-27 PROCEDURE — 80048 BASIC METABOLIC PNL TOTAL CA: CPT | Performed by: EMERGENCY MEDICINE

## 2025-03-27 PROCEDURE — 70250 X-RAY EXAM OF SKULL: CPT

## 2025-03-27 PROCEDURE — 71045 X-RAY EXAM CHEST 1 VIEW: CPT | Mod: FOREIGN READ | Performed by: RADIOLOGY

## 2025-03-27 PROCEDURE — 70250 X-RAY EXAM OF SKULL: CPT | Mod: FOREIGN READ | Performed by: RADIOLOGY

## 2025-03-27 PROCEDURE — 2500000004 HC RX 250 GENERAL PHARMACY W/ HCPCS (ALT 636 FOR OP/ED): Mod: SE | Performed by: EMERGENCY MEDICINE

## 2025-03-27 PROCEDURE — 96376 TX/PRO/DX INJ SAME DRUG ADON: CPT

## 2025-03-27 RX ORDER — ONDANSETRON HYDROCHLORIDE 2 MG/ML
4 INJECTION, SOLUTION INTRAVENOUS ONCE
Status: COMPLETED | OUTPATIENT
Start: 2025-03-27 | End: 2025-03-27

## 2025-03-27 RX ORDER — KETOROLAC TROMETHAMINE 15 MG/ML
15 INJECTION, SOLUTION INTRAMUSCULAR; INTRAVENOUS ONCE
Status: COMPLETED | OUTPATIENT
Start: 2025-03-27 | End: 2025-03-27

## 2025-03-27 RX ORDER — MAGNESIUM SULFATE 1 G/100ML
1 INJECTION INTRAVENOUS ONCE
Status: COMPLETED | OUTPATIENT
Start: 2025-03-27 | End: 2025-03-27

## 2025-03-27 RX ORDER — DIPHENHYDRAMINE HYDROCHLORIDE 50 MG/ML
25 INJECTION, SOLUTION INTRAMUSCULAR; INTRAVENOUS ONCE
Status: COMPLETED | OUTPATIENT
Start: 2025-03-27 | End: 2025-03-27

## 2025-03-27 RX ORDER — HEPARIN 100 UNIT/ML
5 SYRINGE INTRAVENOUS ONCE
Status: DISCONTINUED | OUTPATIENT
Start: 2025-03-27 | End: 2025-03-27 | Stop reason: HOSPADM

## 2025-03-27 RX ORDER — HEPARIN 100 UNIT/ML
SYRINGE INTRAVENOUS
Status: COMPLETED
Start: 2025-03-27 | End: 2025-03-27

## 2025-03-27 RX ORDER — DEXAMETHASONE SODIUM PHOSPHATE 10 MG/ML
10 INJECTION INTRAMUSCULAR; INTRAVENOUS ONCE
Status: COMPLETED | OUTPATIENT
Start: 2025-03-27 | End: 2025-03-27

## 2025-03-27 RX ORDER — METOCLOPRAMIDE HYDROCHLORIDE 5 MG/ML
10 INJECTION INTRAMUSCULAR; INTRAVENOUS ONCE
Status: DISCONTINUED | OUTPATIENT
Start: 2025-03-27 | End: 2025-03-27

## 2025-03-27 RX ADMIN — ONDANSETRON 4 MG: 2 INJECTION INTRAMUSCULAR; INTRAVENOUS at 02:41

## 2025-03-27 RX ADMIN — MAGNESIUM SULFATE HEPTAHYDRATE 1 G: 1 INJECTION, SOLUTION INTRAVENOUS at 01:45

## 2025-03-27 RX ADMIN — HEPARIN 500 UNITS: 100 SYRINGE at 07:04

## 2025-03-27 RX ADMIN — KETOROLAC TROMETHAMINE 15 MG: 15 INJECTION, SOLUTION INTRAMUSCULAR; INTRAVENOUS at 05:03

## 2025-03-27 RX ADMIN — ONDANSETRON 4 MG: 2 INJECTION INTRAMUSCULAR; INTRAVENOUS at 05:58

## 2025-03-27 RX ADMIN — DIPHENHYDRAMINE HYDROCHLORIDE 25 MG: 50 INJECTION INTRAMUSCULAR; INTRAVENOUS at 06:58

## 2025-03-27 RX ADMIN — SODIUM CHLORIDE, SODIUM LACTATE, POTASSIUM CHLORIDE, AND CALCIUM CHLORIDE 1000 ML: .6; .31; .03; .02 INJECTION, SOLUTION INTRAVENOUS at 01:45

## 2025-03-27 RX ADMIN — DEXAMETHASONE SODIUM PHOSPHATE 10 MG: 10 INJECTION INTRAMUSCULAR; INTRAVENOUS at 05:03

## 2025-03-27 ASSESSMENT — PAIN SCALES - GENERAL: PAINLEVEL_OUTOF10: 9

## 2025-03-27 NOTE — ED PROVIDER NOTES
"Emergency Department Provider Note        History of Present Illness     History provided by: Patient  Limitations to History: None  External Records Reviewed with Brief Summary: Discharge Summary from [facility] ED on 3/8/2025 which showed that patient presented with a similar complaint but notably may have bumped her  mass at the time.  Neurosurgery consulted, who dialed the shunt up from 2-3.  Ophthalmology consulted for vision concerns but was cleared.  CT did not show any acute intracranial abnormalities, no changes to  shunt.  Shunt series unremarkable.  Transferred to CDU for observation.  Per ED documentation shunt was readjusted back to 2.    HPI:  Jonathan Ayala is a 49 y.o. 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  Patient believes that this was one of her typical migraines and attempted to use over-the-counter medications to treat it.  However she discovered that the headache did not respond to the medications and reached out to her outpatient neurosurgery and neurology offices who recommended escalation in migraine protocol.  Headache continued to gradually worsen and migraine medications had no effect.  Patient reports that she was beginning to lose her balance \"almost fell twice yesterday,\" nausea without vomiting, some numbness in her hands, a feeling of her head being went, pressure behind the eyes, \"pushing\" sensation in her head.  Denies fever/chills, diarrhea/constipation, trauma.    Physical Exam   Triage vitals:  T 37 °C (98.6 °F)  HR (!) 101  /84  RR 20  O2 97 % None (Room air)    General: Awake, alert, in no acute " distress  Eyes: Gaze conjugate.  No scleral icterus or injection. PERRL.  HENT: Normo-cephalic, atraumatic. No stridor  CV: Regular rate, regular rhythm.  No murmurs rubs or gallops.  Resp: Breathing non-labored, speaking in full sentences.  Clear to auscultation bilaterally  GI: Soft, non-distended, non-tender. No rebound or guarding.  MSK/Extremities: No gross bony deformities.   Skin: Warm. Appropriate color  Neuro: Alert. Oriented. Face symmetric. Speech is fluent.  Gross strength intact in right extremities but 4/5 in left extremities.  Sensation intact on right side of body, decreased on left side to light touch.  Cranial nerves grossly intact, but sensation is decreased on the left face.  Strength of cranial nerve XI is decreased on the left side.  Cranial nerve X and XII unaffected.    Psych: Appropriate mood and affect    Medical Decision Making & ED Course   Medical Decision Makin y.o. 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  Presented earlier this month with similar complaints, so metered prior initial workup which included CT head without contrast, shunt series and headache protocol.  Patient endorsed side effects to metoclopramide and normal saline, which were respectively withheld and exchange for LR.  Magnesium given because patient reported efficacy last admission to emergency department.  Further workup pending imaging, including consult to neurosurgery.    Patient was signed out to Dr. Yip at 0200 pending completion of their work-up.  Please see the next provider's transition of care note for the remainder of the patient's care.     ----    Differential diagnoses considered include  but are not limited to: IIH exacerbation d/t VPS malfunction, VPS infection, primary headache/migraine, intracranial hemorrhage    Independent Result Review and Interpretation: Relevant laboratory and radiographic results were reviewed and independently interpreted by myself.  As necessary, they are commented on in the ED Course.    Chronic conditions affecting the patient's care: As documented above in MDM    The patient was discussed with the following consultants/services:  Pending workup    Care Considerations: As documented above in [facility]    ED Course:  ED Course as of 03/27/25 0159   Thu Mar 27, 2025   0057 XR shunt series  No evidence of discontinuity or malfunction. Unremarkable. [KW]      ED Course User Index  [KW] Tate Grande MD     Disposition   Workup pending.    Procedures   None at time of writing    Patient seen and discussed with ED attending physician.    Tate Grande MD  Emergency Medicine       Tate Grande MD  Resident  03/27/25 0200     86

## 2025-03-27 NOTE — ED TRIAGE NOTES
Pt arrives to the ED with c/o severe headache. Pt advised by her neurosurgeon to come to the ED to get  shunt checked.

## 2025-03-28 DIAGNOSIS — E11.43 TYPE 2 DIABETES MELLITUS WITH DIABETIC AUTONOMIC NEUROPATHY, WITH LONG-TERM CURRENT USE OF INSULIN: ICD-10-CM

## 2025-03-28 DIAGNOSIS — E27.9 ADRENAL NODULE: ICD-10-CM

## 2025-03-28 DIAGNOSIS — Z79.4 TYPE 2 DIABETES MELLITUS WITH DIABETIC AUTONOMIC NEUROPATHY, WITH LONG-TERM CURRENT USE OF INSULIN: ICD-10-CM

## 2025-03-28 DIAGNOSIS — E04.1 THYROID NODULE: ICD-10-CM

## 2025-03-28 RX ORDER — EZETIMIBE 10 MG/1
10 TABLET ORAL DAILY
Qty: 30 TABLET | Refills: 0 | Status: SHIPPED | OUTPATIENT
Start: 2025-03-28

## 2025-03-31 ENCOUNTER — PATIENT OUTREACH (OUTPATIENT)
Dept: PRIMARY CARE | Facility: CLINIC | Age: 50
End: 2025-03-31
Payer: MEDICAID

## 2025-03-31 DIAGNOSIS — Z79.4 TYPE 2 DIABETES MELLITUS WITH DIABETIC AUTONOMIC NEUROPATHY, WITH LONG-TERM CURRENT USE OF INSULIN: ICD-10-CM

## 2025-03-31 DIAGNOSIS — I10 BENIGN ESSENTIAL HYPERTENSION: ICD-10-CM

## 2025-03-31 DIAGNOSIS — F33.42 MAJOR DEPRESSIVE DISORDER, RECURRENT, IN FULL REMISSION WITH ANXIOUS DISTRESS: ICD-10-CM

## 2025-03-31 DIAGNOSIS — E11.43 TYPE 2 DIABETES MELLITUS WITH DIABETIC AUTONOMIC NEUROPATHY, WITH LONG-TERM CURRENT USE OF INSULIN: ICD-10-CM

## 2025-03-31 NOTE — PROGRESS NOTES
Left message for Elina Naranjo [place] [facility] care manager at [facility]        Message was    Need a letter of necessity for the home modification   Need last ov note and med list  Left message back as there is no fax number and to update we would need the letter of medical necessity faxed to us     Await a call back.        Reviewed chart    In Edx2, one for migrane and dc home 3/27 and   Unable to view notes from visit on 3/28  Outreach to pt on 3/31  Late entry for outreach on 3/31   Talked with pt   Pt stated she is tired but she was able to break the migrane cycle after going to the Er x2. Reviewed migranes  She stated sleeping a lot.   Pt having issues with her hha. She stated that her  is workign on getting another hha. She had asked about her  doing hha. Reviewed there is a way and he would have to take a test but also discussed the benefits of having a non family member be the hha.   Sent wC script to [facility] per the manager from [facility],.   She is in pain at times. Reviewed stretching to help with pain.   Reviewed list of supplies the pt needs. Will work on and not sure it will all be covered. Pt had sent a list to the office with numbers and pictures.

## 2025-04-01 ENCOUNTER — TELEPHONE (OUTPATIENT)
Dept: ENDOCRINOLOGY | Facility: CLINIC | Age: 50
End: 2025-04-01
Payer: MEDICAID

## 2025-04-01 ENCOUNTER — HOSPITAL ENCOUNTER (OUTPATIENT)
Dept: RADIOLOGY | Facility: HOSPITAL | Age: 50
Discharge: HOME | End: 2025-04-01
Payer: MEDICAID

## 2025-04-01 ENCOUNTER — HOSPITAL ENCOUNTER (OUTPATIENT)
Dept: RADIOLOGY | Facility: HOSPITAL | Age: 50
End: 2025-04-01
Payer: MEDICAID

## 2025-04-01 ENCOUNTER — PATIENT OUTREACH (OUTPATIENT)
Dept: PRIMARY CARE | Facility: CLINIC | Age: 50
End: 2025-04-01
Payer: MEDICAID

## 2025-04-01 DIAGNOSIS — Z79.4 TYPE 2 DIABETES MELLITUS WITH DIABETIC AUTONOMIC NEUROPATHY, WITH LONG-TERM CURRENT USE OF INSULIN: ICD-10-CM

## 2025-04-01 DIAGNOSIS — F33.42 MAJOR DEPRESSIVE DISORDER, RECURRENT, IN FULL REMISSION WITH ANXIOUS DISTRESS: ICD-10-CM

## 2025-04-01 DIAGNOSIS — E11.43 TYPE 2 DIABETES MELLITUS WITH DIABETIC AUTONOMIC NEUROPATHY, WITH LONG-TERM CURRENT USE OF INSULIN: ICD-10-CM

## 2025-04-01 DIAGNOSIS — E04.1 THYROID NODULE: ICD-10-CM

## 2025-04-01 DIAGNOSIS — E27.9 ADRENAL NODULE: ICD-10-CM

## 2025-04-01 DIAGNOSIS — D83.9 CVID (COMMON VARIABLE IMMUNODEFICIENCY): ICD-10-CM

## 2025-04-01 DIAGNOSIS — I10 BENIGN ESSENTIAL HYPERTENSION: ICD-10-CM

## 2025-04-01 PROCEDURE — 74150 CT ABDOMEN W/O CONTRAST: CPT | Performed by: STUDENT IN AN ORGANIZED HEALTH CARE EDUCATION/TRAINING PROGRAM

## 2025-04-01 PROCEDURE — 76536 US EXAM OF HEAD AND NECK: CPT

## 2025-04-01 PROCEDURE — 74150 CT ABDOMEN W/O CONTRAST: CPT

## 2025-04-01 PROCEDURE — 76536 US EXAM OF HEAD AND NECK: CPT | Performed by: RADIOLOGY

## 2025-04-01 NOTE — TELEPHONE ENCOUNTER
Patient called stating she was informed on her way to her radiology scan that they could not do the the ct of her adrenal glands as ordered due to insurance issue. Patient states she was told they need an alternative diagnosis. Encouraged patient to reach out to insurance to find out what they need to cover the exam. This nurse will reach out to facility as well.

## 2025-04-01 NOTE — PROGRESS NOTES
Talked with Indy at [facility]  The  home modification request has been sent in and they are running estimates now.     She asked that I fax the WC script to [facility], sent OV notes and the script at this time.   Updated Indy via emial that it was sent

## 2025-04-02 ENCOUNTER — TELEPHONE (OUTPATIENT)
Dept: PRIMARY CARE | Facility: CLINIC | Age: 50
End: 2025-04-02
Payer: MEDICAID

## 2025-04-02 NOTE — TELEPHONE ENCOUNTER
[facility] called and said that they have received the OVN but they need that note to be addend and say that patient needs the wheelchair to complete daily ADL's and that patient cannot walk with a walker or cane.

## 2025-04-03 ENCOUNTER — APPOINTMENT (OUTPATIENT)
Dept: ENDOCRINOLOGY | Facility: CLINIC | Age: 50
End: 2025-04-03
Payer: MEDICAID

## 2025-04-03 VITALS — WEIGHT: 250 LBS | HEIGHT: 62 IN | BODY MASS INDEX: 46.01 KG/M2

## 2025-04-03 DIAGNOSIS — E11.65 UNCONTROLLED TYPE 2 DIABETES MELLITUS WITH HYPERGLYCEMIA: ICD-10-CM

## 2025-04-03 DIAGNOSIS — E04.1 THYROID NODULE: ICD-10-CM

## 2025-04-03 DIAGNOSIS — E06.3 HYPOTHYROIDISM DUE TO HASHIMOTO THYROIDITIS: ICD-10-CM

## 2025-04-03 DIAGNOSIS — Z79.4 TYPE 2 DIABETES MELLITUS WITH HYPERGLYCEMIA, WITH LONG-TERM CURRENT USE OF INSULIN: Primary | ICD-10-CM

## 2025-04-03 DIAGNOSIS — E11.65 TYPE 2 DIABETES MELLITUS WITH HYPERGLYCEMIA, WITH LONG-TERM CURRENT USE OF INSULIN: Primary | ICD-10-CM

## 2025-04-03 DIAGNOSIS — E27.9 ADRENAL NODULE: ICD-10-CM

## 2025-04-03 PROCEDURE — 4010F ACE/ARB THERAPY RXD/TAKEN: CPT | Performed by: INTERNAL MEDICINE

## 2025-04-03 PROCEDURE — 1036F TOBACCO NON-USER: CPT | Performed by: INTERNAL MEDICINE

## 2025-04-03 PROCEDURE — 99215 OFFICE O/P EST HI 40 MIN: CPT | Performed by: INTERNAL MEDICINE

## 2025-04-03 PROCEDURE — 3008F BODY MASS INDEX DOCD: CPT | Performed by: INTERNAL MEDICINE

## 2025-04-03 RX ORDER — EZETIMIBE 10 MG/1
TABLET ORAL
Start: 2025-04-03

## 2025-04-03 ASSESSMENT — ENCOUNTER SYMPTOMS
OCCASIONAL FEELINGS OF UNSTEADINESS: 0
LOSS OF SENSATION IN FEET: 0
DEPRESSION: 0

## 2025-04-03 NOTE — PROGRESS NOTES
Consults    Reason For Consult  Diabetes    History Of Present Illness  Jonathan Ayala is a 49 y.o. female presenting with diabetes and secondary adrenal insufficiency.    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 And had reaction in her skin.  T slim also did not work.with steel , but was not successful  Lispro 1 to 2 carb ratio, but she is having hypoglycemia   GLP-1 caused worsening of gastroparesis - so was hospitalized due to this   Statin intolerance as well  SGLT-2 she had yeast infection                Home Management    Results from Most Recent A1C  HEMOGLOBIN A1c   Date/Time Value Ref Range Status   03/04/2025 09:16 AM 8.7 (H) <5.7 % of total Hgb Final     Comment:     For someone without known diabetes, a hemoglobin A1c  value of 6.5% or greater indicates that they may have   diabetes and this should be confirmed with a follow-up   test.     For someone with known diabetes, a value <7% indicates   that their diabetes is well controlled and a value   greater than or equal to 7% indicates suboptimal   control. A1c targets should be individualized based on   duration of diabetes, age, comorbid conditions, and   other considerations.     Currently, no consensus exists regarding use of  hemoglobin A1c for diagnosis of diabetes for children.              Diabetes Problem List Entries with Dates  Problem List:  2023-07: Type 2 diabetes mellitus with hyperglycemia, with long-term   current use of insulin  2020-08: Diabetic gastroparesis associated with type 2 diabetes   mellitus           Any family history of thyroid cancer? no  Any personal history of radiation to your head/neck? no    Past Medical History  She has a past medical history of Abnormal findings on diagnostic imaging of other abdominal regions, including  retroperitoneum (10/14/2020), Acquired deformity of nose (03/24/2022), Acute upper respiratory infection, unspecified (10/16/2019), Adrenal disease (Multi), Allergic, Allergy status to unspecified drugs, medicaments and biological substances (05/22/2020), Allergy status to unspecified drugs, medicaments and biological substances (11/13/2020), Anemia, Anxiety (2005), Asthma, Benign intracranial hypertension (01/27/2022), Bipolar disorder, unspecified (Multi), Breast calcification, right (08/21/2018), Cellulitis of abdominal wall (09/28/2022), Cervicalgia (07/01/2020), Chronic maxillary sinusitis (01/04/2022), Chronic sialoadenitis (03/16/2020), COVID-19 (01/06/2022), Decreased white blood cell count, unspecified (11/04/2019), Disease of thyroid gland, Disturbances of salivary secretion (03/16/2020), Dry eye syndrome of bilateral lacrimal glands (10/07/2022), Encounter for preprocedural cardiovascular examination (02/01/2022), Food additives allergy status (06/11/2020), Fracture of nasal bones, initial encounter for closed fracture (03/03/2022), GERD (gastroesophageal reflux disease) (15 years old), Granuloma of right orbit (10/07/2021), History of endometrial ablation (11/09/2017), Hyperlipidemia, Hypertension, Hyperthyroidism, Hypoglycemia, Hypothyroidism, Immunocompromised, Localized swelling, mass and lump, head (03/24/2022), Major depressive disorder, recurrent, in full remission (10/07/2021), 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), Meningitis ([facility] (2008), Migraine, Nipple discharge (08/24/2022), Ocular pain, right eye (10/07/2022), Optic atrophy, Other abnormal and inconclusive findings on diagnostic imaging of breast (07/06/2020), Other anomalies of pupillary function (05/31/2019), Other chest pain (05/18/2020), Other conditions influencing health status (08/01/2022), Other conditions influencing health status (08/03/2021), Other specified disorders of eustachian tube, left ear (11/18/2019),  Other specified disorders of nose and nasal sinuses (03/24/2022), Other specified disorders of nose and nasal sinuses (03/24/2022), Pelvic and perineal pain (07/06/2020), Personal history of other diseases of the circulatory system (04/07/2020), Personal history of other diseases of the circulatory system (04/07/2020), Personal history of other diseases of the circulatory system, Personal history of other diseases of the digestive system, Personal history of other diseases of the digestive system (03/02/2020), Personal history of other diseases of the musculoskeletal system and connective tissue (01/19/2022), Personal history of other diseases of the musculoskeletal system and connective tissue (03/02/2021), Personal history of other diseases of the musculoskeletal system and connective tissue (06/16/2020), Personal history of other diseases of the nervous system and sense organs (11/18/2019), Personal history of other diseases of the nervous system and sense organs (09/21/2022), Personal history of other diseases of the respiratory system (04/14/2021), Personal history of other endocrine, nutritional and metabolic disease (02/17/2021), Personal history of other mental and behavioral disorders (05/27/2021), Personal history of other specified conditions (09/07/2022), Personal history of other specified conditions (10/16/2019), Personal history of other specified conditions (09/28/2022), Personal history of other specified conditions (09/16/2021), Personal history of other specified conditions (02/01/2022), Personal history of other specified conditions (03/09/2022), Personal history of other specified conditions (02/12/2014), Personal history of other specified conditions (10/27/2021), Personal history of other specified conditions (10/16/2019), Personal history of other specified conditions (02/26/2021), Personal history of other specified conditions (02/22/2021), Personal history of urinary calculi, Polycystic  ovary syndrome, Postural orthostatic tachycardia syndrome (POTS), Rash and other nonspecific skin eruption (03/15/2022), Repeated falls (06/23/2021), Right lower quadrant pain (10/14/2020), Seizures (Multi), Sjogren syndrome, unspecified (Multi), Sjogren's syndrome, Sleep apnea, Slow transit constipation (07/09/2020), Subarachnoid hemorrhage, traumatic (Multi) (04/19/2023), Thyroid nodule, Traumatic subarachnoid hemorrhage with loss of consciousness of unspecified duration, subsequent encounter (03/15/2022), Traumatic subarachnoid hemorrhage without loss of consciousness, subsequent encounter, Type 2 diabetes mellitus, Unspecified disorder of refraction (10/07/2022), Unspecified optic neuritis (11/06/2020), Unspecified optic neuritis (11/06/2020), Unspecified visual loss (09/25/2019), Varicella (As a child), Venous insufficiency (chronic) (peripheral) (10/18/2021), Viral infection, unspecified (01/11/2022), Vitamin D deficiency, and Vitamin D deficiency, unspecified (09/28/2022).    Surgical History  She has a past surgical history that includes Other surgical history (08/22/2019); Other surgical history (08/22/2019); Other surgical history (08/22/2019); Other surgical history (08/22/2019); Other surgical history (08/22/2019); Other surgical history (08/22/2019); MR angio neck wo IV contrast (02/08/2021); MR angio head wo IV contrast (02/08/2021); [place] tooth extraction (2004); Appendectomy (2017); Hysterectomy (2017); Hernia repair (Right, 03/01/2024); Cardiac catheterization; Cholecystectomy; Fracture surgery (12/2023); Endometrial ablation; and Brain surgery ([place] placement to measure pressure).     Social History  She reports that she has never smoked. She has never used smokeless tobacco. She reports that she does not currently use alcohol. She reports current drug use. Drug: Benzodiazepines.    Family History  Family History   Problem Relation Name Age of Onset    Other (Perforated bowel) Mother Radha      Hypertension Mother Radha     Macular degeneration Mother Radha     Depression Mother Radha     Hyperlipidemia Mother Radha     Mental illness Mother Radha     Hyperlipidemia Father Mac     Hypertension Father Mac     Heart attack Father Mac     Other (S/P CABG) Father Mac     Diabetes Father Mac     Prostate cancer Father Mac     Coronary artery disease Father Mac     Heart failure Father Mac     Stroke Father Mac     Cancer Father Mac     Macular degeneration Father Mac     Retinal detachment Father Mac     Asthma Father Mac     Hearing loss Father Mac     Heart disease Father Mac     Hernia Father Mac     Stroke Sister Jazmin     Breast cancer Sister Jazmin     Lupus Sister Jazmin     Ovarian cancer Sister Jazmin     Astigmatism Sister Jazmin     Arthritis Sister Jazmin     Asthma Sister Jazmin     Depression Sister Jazmin     Mental illness Sister Jazmin     Miscarriages / Stillbirths Sister Jazmin     Vision loss Sister Jazmin     Hyperlipidemia Brother Sanchez     Hypertension Brother Sanchez     Astigmatism Brother Sanchez     Arthritis Brother Sanchez     Asthma Brother Sanchez     Diabetes Father's Sister Linda     Blindness Father's Sister Linda     Vision loss Father's Sister Linda     Thyroid cancer Father's Sister Bekah     Thyroid disease Father's Sister Jessica     Colon cancer Father's Brother ?     Cancer Father's Brother Kian     Anesthesia related problems Father's Brother Kian     Depression Father's Brother Kian     Hernia Father's Brother Kian     Mental illness Father's Brother Kian     Cancer Maternal Grandmother Brenda     Ovarian cancer Maternal Grandmother Brenda     Blindness Other Multiple     Melanoma Other      Asthma Other      Other (hay fever) Other      Allergies Other      Alcohol abuse Paternal Grandfather Elkin     Arthritis Sister Isha     Asthma Sister Isha     Cancer Sister Isha     Depression Sister Isha     Mental illness Sister Isha     Miscarriages / Stillbirths  Sister Isha     Ovarian cancer Sister Isha     Glaucoma Neg Hx          Allergies  Acetazolamide, Atorvastatin, Cefdinir, Ceftriaxone, Doxepin, Duloxetine, Fd and c red no.40, Levofloxacin, Levofloxacin in d5w, Nutritional supplements, Ozempic [semaglutide], Prochlorperazine, Red dye, Rosemary, Rosemary oil, Strawberry, Sulfa (sulfonamide antibiotics), Topiramate, Tree pollen-black walnut, Tree pollen-pecan, [place], Aripiprazole, Aspartame, Aspartame (bulk), Fenofibrate, Hydrochlorothiazide, Hydromorphone, Iron, Meclizine, Metformin, Propoxyphene, Statins-hmg-coa reductase inhibitors, Tetracyclines, Thiazides, Venlafaxine, Adhesive tape-silicones, Barbiturates, Ciprofloxacin, Dhe, Diet foods, Farxiga [dapagliflozin], Ferrous sulfate, Keflex [cephalexin], L.acidoph-ladriane-b.bif-s.therm, Nsaids (non-steroidal anti-inflammatory drug), Other, Sulfonylureas, Tobramycin, Vancomycin, Adhesive, Azithromycin, Betamethasone, House dust, Metoclopramide hcl, Prednisone, Propoxyphene-acetaminophen, Sulfacetamide sodium, Ummjvobg-0-zb4 antimigraine agents, and Zolpidem    Review of Systems    Past Medical History:   Diagnosis Date    Abnormal findings on diagnostic imaging of other abdominal regions, including retroperitoneum 10/14/2020    Abnormal CT of the abdomen    Acquired deformity of nose 03/24/2022    Nasal deformity    Acute upper respiratory infection, unspecified 10/16/2019    Acute URI    Adrenal disease (Multi)     Allergic     Allergy status to unspecified drugs, medicaments and biological substances 05/22/2020    History of drug allergy    Allergy status to unspecified drugs, medicaments and biological substances 11/13/2020    History of adverse drug reaction    Anemia     Anxiety 2005    Asthma     Benign intracranial hypertension 01/27/2022    Pseudotumor cerebri    Bipolar disorder, unspecified (Multi)     Bipolar disease, chronic    Breast calcification, right 08/21/2018    Cellulitis of abdominal wall  09/28/2022    Cellulitis of right abdominal wall    Cervicalgia 07/01/2020    Cervicalgia of mfuqdipz-uivpqkk-itzeu region    Chronic maxillary sinusitis 01/04/2022    Chronic maxillary sinusitis    Chronic sialoadenitis 03/16/2020    Chronic sialoadenitis    COVID-19 01/06/2022    COVID-19 with multiple comorbidities    Decreased white blood cell count, unspecified 11/04/2019    Leukopenia    Disease of thyroid gland     Disturbances of salivary secretion 03/16/2020    Xerostomia    Dry eye syndrome of bilateral lacrimal glands 10/07/2022    Dry eyes, bilateral    Encounter for preprocedural cardiovascular examination 02/01/2022    Preoperative cardiovascular examination    Food additives allergy status 06/11/2020    Allergy to food dye    Fracture of nasal bones, initial encounter for closed fracture 03/03/2022    Closed fracture of nasal bone, initial encounter    GERD (gastroesophageal reflux disease) 15 years old    Granuloma of right orbit 10/07/2021    Inflammatory pseudotumor of right orbit    History of endometrial ablation 11/09/2017    Hyperlipidemia     Hypertension     Hyperthyroidism     Hypoglycemia     Hypothyroidism     Immunocompromised     Localized swelling, mass and lump, head 03/24/2022    Swollen nose    Major depressive disorder, recurrent, in full remission 10/07/2021    Depression, major, recurrent, in complete remission    Mammary duct ectasia of left breast 08/24/2022    Periductal mastitis of left breast    Meningitis ([facility] 2008    Migraine     Nipple discharge 08/24/2022    Bloody discharge from left nipple    Ocular pain, right eye 10/07/2022    Pain in right eye    Optic atrophy     Other abnormal and inconclusive findings on diagnostic imaging of breast 07/06/2020    Other abnormal and inconclusive findings on diagnostic imaging of breast    Other anomalies of pupillary function 05/31/2019    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    Other chest pain 05/18/2020    Chest  discomfort    Other conditions influencing health status 08/01/2022    History of cough    Other conditions influencing health status 08/03/2021    Chronic migraine    Other specified disorders of eustachian tube, left ear 11/18/2019    ETD (Eustachian tube dysfunction), left    Other specified disorders of nose and nasal sinuses 03/24/2022    Nasal dryness    Other specified disorders of nose and nasal sinuses 03/24/2022    Nasal crusting    Pelvic and perineal pain 07/06/2020    Pelvic pain    Personal history of other diseases of the circulatory system 04/07/2020    History of sinus tachycardia    Personal history of other diseases of the circulatory system 04/07/2020    History of abnormal electrocardiography    Personal history of other diseases of the circulatory system     History of Raynaud's syndrome    Personal history of other diseases of the digestive system     History of irritable bowel syndrome    Personal history of other diseases of the digestive system 03/02/2020    History of oral pain    Personal history of other diseases of the musculoskeletal system and connective tissue 01/19/2022    History of neck pain    Personal history of other diseases of the musculoskeletal system and connective tissue 03/02/2021    History of scleroderma    Personal history of other diseases of the musculoskeletal system and connective tissue 06/16/2020    History of muscle weakness    Personal history of other diseases of the nervous system and sense organs 11/18/2019    History of hearing loss    Personal history of other diseases of the nervous system and sense organs 09/21/2022    History of partial seizures    Personal history of other diseases of the respiratory system 04/14/2021    History of asthma    Personal history of other endocrine, nutritional and metabolic disease 02/17/2021    History of diabetes mellitus    Personal history of other mental and behavioral disorders 05/27/2021    History of anxiety     Personal history of other specified conditions 09/07/2022    History of nipple discharge    Personal history of other specified conditions 10/16/2019    History of headache    Personal history of other specified conditions 09/28/2022    History of lump of left breast    Personal history of other specified conditions 09/16/2021    History of persistent cough    Personal history of other specified conditions 02/01/2022    History of palpitations    Personal history of other specified conditions 03/09/2022    History of headache    Personal history of other specified conditions 02/12/2014    History of chest pain    Personal history of other specified conditions 10/27/2021    History of nausea and vomiting    Personal history of other specified conditions 10/16/2019    History of fatigue    Personal history of other specified conditions 02/26/2021    History of orthopnea    Personal history of other specified conditions 02/22/2021    History of shortness of breath    Personal history of urinary calculi     H/O renal calculi    Polycystic ovary syndrome     Postural orthostatic tachycardia syndrome (POTS)     POTS (postural orthostatic tachycardia syndrome)    Rash and other nonspecific skin eruption 03/15/2022    Rash    Repeated falls 06/23/2021    Recurrent falls    Right lower quadrant pain 10/14/2020    Abdominal pain, RLQ (right lower quadrant)    Seizures (Multi)     Sjogren syndrome, unspecified (Multi)     History of Sjogren's disease    Sjogren's syndrome     Sleep apnea     Slow transit constipation 07/09/2020    Slow transit constipation    Subarachnoid hemorrhage, traumatic (Multi) 04/19/2023    Thyroid nodule     Traumatic subarachnoid hemorrhage with loss of consciousness of unspecified duration, subsequent encounter 03/15/2022    Subarachnoid hemorrhage following injury, with loss of consciousness, subsequent encounter    Traumatic subarachnoid hemorrhage without loss of consciousness, subsequent  encounter     Subarachnoid hemorrhage following injury, no loss of consciousness, subsequent encounter    Type 2 diabetes mellitus     Unspecified disorder of refraction 10/07/2022    Refractive error    Unspecified optic neuritis 11/06/2020    Right optic neuritis    Unspecified optic neuritis 11/06/2020    Optic neuritis, right    Unspecified visual loss 09/25/2019    Vision loss    Varicella As a child    Venous insufficiency (chronic) (peripheral) 10/18/2021    Chronic venous insufficiency of lower extremity    Viral infection, unspecified 01/11/2022    Nonspecific syndrome suggestive of viral illness    Vitamin D deficiency     Vitamin D deficiency, unspecified 09/28/2022    Vitamin D deficiency       Past Surgical History:   Procedure Laterality Date    APPENDECTOMY  2017    BRAIN SURGERY  [place] placement to measure pressure    CARDIAC CATHETERIZATION      CHOLECYSTECTOMY      ENDOMETRIAL ABLATION      FRACTURE SURGERY  12/2023    HERNIA REPAIR Right 03/01/2024    with mesh    HYSTERECTOMY  2017    MR HEAD ANGIO WO IV CONTRAST  02/08/2021    MR HEAD ANGIO WO IV CONTRAST 2/8/2021 [facility]ICAL [facility]GAC[facility]    MR NECK ANGIO WO IV CONTRAST  02/08/2021    MR NECK ANGIO WO IV CONTRAST 2/8/2021 [facility] CLINICAL LEGAC[facility]    OTHER SURGICAL HISTORY  08/22/2019    Carpal tunnel surgery    OTHER SURGICAL HISTORY  08/22/2019    Hysterectomy    OTHER SURGICAL HISTORY  08/22/2019    Venous access port placement    OTHER SURGICAL HISTORY  08/22/2019    Cholecystectomy    OTHER SURGICAL HISTORY  08/22/2019    Appendectomy    OTHER SURGICAL HISTORY  08/22/2019    Pyloroplasty    WISDOM TOOTH EXTRACTION  2004       Social History     Socioeconomic History    Marital status:      Spouse name: Not on file    Number of children: Not on file    Years of education: Not on file    Highest education level: Not on file   Occupational History    Not on file   Tobacco Use    Smoking status: Never    Smokeless tobacco: Never   Vaping Use     Vaping status: Never Used   Substance and Sexual Activity    Alcohol use: Not Currently     Comment: Socially in College    Drug use: Yes     Types: Benzodiazepines    Sexual activity: Not Currently     Partners: Male     Birth control/protection: Abstinence, Surgical     Comment: Partial Hysterectomy   Other Topics Concern    Not on file   Social History Narrative    Not on file     Social Drivers of Health     Financial Resource Strain: Medium Risk (2/17/2025)    Overall Financial Resource Strain (CARDIA)     Difficulty of Paying Living Expenses: Somewhat hard   Food Insecurity: Food Insecurity Present (2/17/2025)    Hunger Vital Sign     Worried About Running Out of Food in the Last Year: Often true     Ran Out of Food in the Last Year: Sometimes true   Transportation Needs: No Transportation Needs (2/17/2025)    PRAPARE - Transportation     Lack of Transportation (Medical): No     Lack of Transportation (Non-Medical): No   Recent Concern: Transportation Needs - High Risk (12/19/2024)    Received from [facility] SDOH Screening     Do you have transportation to your doctor's appointment?: Yes   Physical Activity: Inactive (7/28/2021)    [facility]    Exercise Vital Sign     Days of Exercise per Week: 0 days     Minutes of Exercise per Session: 0 min   Stress: Stress Concern Present (7/28/2021)    Received from [facility]    [place] [employer] - Occupational Stress Questionnaire     Feeling of Stress : To some extent   Social Connections: Moderately Integrated (7/28/2021)    Received from [facility]    Social Connection and Isolation Panel [NHANES]     Frequency of Communication with Friends and Family: Three times a week     Frequency of Social Gatherings with Friends and Family: Twice a week     " Attends Anabaptism Services: 1 to 4 times per year     Active Member of Clubs or Organizations: No     Attends Club or Organization Meetings: Never     Marital Status:    Intimate Partner Violence: Not At Risk (2/17/2025)    Humiliation, Afraid, Rape, and Kick questionnaire     Fear of Current or Ex-Partner: No     Emotionally Abused: No     Physically Abused: No     Sexually Abused: No   Housing Stability: High Risk (2/17/2025)    Housing Stability Vital Sign     Unable to Pay for Housing in the Last Year: Yes     Number of Times Moved in the Last Year: 0     Homeless in the Last Year: No        Physical Exam     ROS, PMH, FH/SH, surgical history and allergies have been reviewed.    Last Recorded Vitals  Height 1.575 m (5' 2\"), weight 113 kg (250 lb).    Relevant Results         If you would like to pull in Medications, type .meds     If you would like to pull in Lab results for the last 24 hours, type .[id number]    If you would like to pull in specific Lab results, type .ll     If you would like to pull in Imaging results, type .imgrslt :99}  Lab Review  Lab Results   Component Value Date    BILITOT 0.2 03/08/2025    CALCIUM 9.6 03/27/2025    CO2 27 03/27/2025     03/27/2025    CREATININE 0.82 03/27/2025    GLUCOSE 138 (H) 03/27/2025    ALKPHOS 70 03/08/2025    K 3.8 03/27/2025    PROT 6.3 (L) 03/08/2025     03/27/2025    AST 19 03/08/2025    ALT 27 03/08/2025    BUN 18 03/27/2025    ANIONGAP 13 03/27/2025    MG 2.18 03/09/2025    PHOS 3.2 01/31/2025    LDH 95 07/17/2020    ALBUMIN 3.3 (L) 03/08/2025    LIPASE 40 03/08/2025    GFRF 89 08/06/2023    GFRMALE CANCELED 04/30/2023     Lab Results   Component Value Date    TRIG 385 (H) 03/04/2025    CHOL 226 (H) 03/04/2025    LDLCALC 111 (H) 03/04/2025    HDL 60 03/04/2025     Lab Results   Component Value Date    HGBA1C 8.7 (H) 03/04/2025    HGBA1C 7.2 (H) 09/20/2024    HGBA1C 7.9 (H) 04/18/2024     The ASCVD Risk score (Mamie SANDERSON, et al., 2019) " failed to calculate for the following reasons:    Risk score cannot be calculated because patient has a medical history suggesting prior/existing ASCVD       Assessment/Plan   Problem List Items Addressed This Visit             ICD-10-CM    Hypothyroidism E03.9    Relevant Orders    TSH with reflex to Free T4 if abnormal    Thyroid nodule E04.1    Type 2 diabetes mellitus with hyperglycemia, with long-term current use of insulin - Primary E11.65, Z79.4    Relevant Orders    Referral to Diabetes Education    Cortisol    Adrenocorticotropic Hormone (ACTH)    DHEA-Sulfate    TSH with reflex to Free T4 if abnormal     Other Visit Diagnoses         Codes    Adrenal nodule     E27.9    Uncontrolled type 2 diabetes mellitus with hyperglycemia     E11.65    Relevant Medications    ezetimibe (Zetia) 10 mg tablet            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  Continue     Uncontrolled diabetes i type II difficult to control at this time I am going to see if we can try insulin patch with U-500 as she had severe reaction to OmniPod and could not benefit due to pocketing of insulin below her skin from tandem   She is going to see our educator Alysha  ophthalmology 4/4/2025   CT - no adrenal nodules from recent CT reviewed by radiology and me     I spent a total of 40+ minutes on the date of the service which included preparing to see the patient, face-to-face patient care, completing clinical documentation, obtaining and / or reviewing separately obtained history, counseling and educating the  patient/family/caregiver, ordering medications, tests, or procedures, communicating with other healthcare providers (not separately reported), independently interpreting results, not separately reported, and communicating results to the patient/family/caregiver, real-time telemedicine visit using audiovisual technology with patient's verbal consent.  Name and date of birth verified.        Marah Felix MD

## 2025-04-04 ENCOUNTER — APPOINTMENT (OUTPATIENT)
Dept: OPHTHALMOLOGY | Facility: CLINIC | Age: 50
End: 2025-04-04
Payer: MEDICAID

## 2025-04-04 DIAGNOSIS — G93.2 IIH (IDIOPATHIC INTRACRANIAL HYPERTENSION): ICD-10-CM

## 2025-04-04 DIAGNOSIS — G93.2 BENIGN INTRACRANIAL HYPERTENSION: ICD-10-CM

## 2025-04-04 PROCEDURE — 92083 EXTENDED VISUAL FIELD XM: CPT | Performed by: PSYCHIATRY & NEUROLOGY

## 2025-04-04 PROCEDURE — 99214 OFFICE O/P EST MOD 30 MIN: CPT | Performed by: PSYCHIATRY & NEUROLOGY

## 2025-04-04 PROCEDURE — 92133 CPTRZD OPH DX IMG PST SGM ON: CPT | Performed by: PSYCHIATRY & NEUROLOGY

## 2025-04-04 RX ORDER — FUROSEMIDE 20 MG/1
10 TABLET ORAL 2 TIMES DAILY
Qty: 30 TABLET | Refills: 3 | Status: SHIPPED | OUTPATIENT
Start: 2025-04-04 | End: 2025-08-02

## 2025-04-04 ASSESSMENT — ENCOUNTER SYMPTOMS
CONSTITUTIONAL NEGATIVE: 0
HEMATOLOGIC/LYMPHATIC NEGATIVE: 0
ENDOCRINE NEGATIVE: 0
CARDIOVASCULAR NEGATIVE: 0
ALLERGIC/IMMUNOLOGIC NEGATIVE: 0
RESPIRATORY NEGATIVE: 0
EYES NEGATIVE: 1
PSYCHIATRIC NEGATIVE: 0
NEUROLOGICAL NEGATIVE: 1
GASTROINTESTINAL NEGATIVE: 0
MUSCULOSKELETAL NEGATIVE: 0

## 2025-04-04 ASSESSMENT — CONF VISUAL FIELD
OD_SUPERIOR_NASAL_RESTRICTION: 1
OD_INFERIOR_NASAL_RESTRICTION: 1
OS_INFERIOR_NASAL_RESTRICTION: 3
OD_INFERIOR_TEMPORAL_RESTRICTION: 3
OS_SUPERIOR_TEMPORAL_RESTRICTION: 1
OS_SUPERIOR_NASAL_RESTRICTION: 1
OS_INFERIOR_TEMPORAL_RESTRICTION: 3

## 2025-04-04 ASSESSMENT — CUP TO DISC RATIO
OS_RATIO: 0.15
OD_RATIO: 0.15

## 2025-04-04 ASSESSMENT — VISUAL ACUITY
OD_SC: CF @ 1FT
OS_SC: CF @1FT
OS_PH_SC: 20/400+1
METHOD: SNELLEN - LINEAR

## 2025-04-04 ASSESSMENT — EXTERNAL EXAM - RIGHT EYE: OD_EXAM: NORMAL

## 2025-04-04 ASSESSMENT — TONOMETRY
OS_IOP_MMHG: 20
OD_IOP_MMHG: 26
IOP_METHOD: TONOPEN APPLANATION

## 2025-04-04 ASSESSMENT — SLIT LAMP EXAM - LIDS
COMMENTS: NORMAL
COMMENTS: NORMAL

## 2025-04-04 ASSESSMENT — EXTERNAL EXAM - LEFT EYE: OS_EXAM: NORMAL

## 2025-04-04 NOTE — PROGRESS NOTES
"Assessment and Plan    06/08/2021 +OKN response OD  09/30/2019 +OKN response OD    03/27/2025 CT head without contrast, which I personally reviewed, shows the right frontal approach ventriculoperitoneal shunt catheter.  03/08/2025 CT head without contrast, which I personally reviewed, shows the right frontal approach ventriculoperitoneal shunt catheter.    02/17/2025 MRI brain with contrast, which I personally reviewed previously, shows the right frontal approach ventriculoperitoneal shunt catheter with gliotic changes along the tract and pituitary flattening. Radiology also reported \"Postsurgical changes with stable positioning of right frontal approach ventricular catheter and findings suggestive of patency of the catheter. However, CSF flow/cine images demonstrate no definite flow within the shunt catheter. Stable size and configuration of the ventricular system when compared to prior exam from 02/17/2025 with no evidence of hydrocephalus.\"  02/15/2025 CT head without contrast, which I personally reviewed previously, shows right frontal approach ventriculoperitoneal shunt catheter.  Interval head imaging.  12/09/2024 CT head without contrast, which I personally reviewed previously, shows the right frontal ventriculoperitoneal shunt catheter.  11/15/2024 CT head without contrast, which I personally reviewed previously, shows the right frontal ventriculoperitoneal shunt catheter.  Interval head imaging.  10/29/2024 CT head without contrast, which I personally reviewed previously, shows the right frontal ventriculoperitoneal shunt catheter.  10/23/2024 MRI brain & orbits with contrast, which I personally reviewed previously, shows the right frontal ventriculoperitoneal shunt catheter.  10/23/2024 CT head without contrast, which I personally reviewed previously, shows the right frontal ventriculoperitoneal shunt catheter.  09/24/2024 CT head without contrast, which I personally reviewed previously, shows interval " "placement of a right frontal approach ventriculoperitoneal shunt.  09/23/2024 CT head without contrast, which I personally reviewed previously, shows no lesion.  09/21/2024 MRI brain without contrast, which I personally reviewed previously, shows stable findings.  09/21/2024 CT head without contrast & CTA head & neck, which I personally reviewed previously, shows the right frontal encephalomalacia.  09/17/2024 MRI brain & orbits with contrast & MRV head, which I personally reviewed previously, show stable right frontal encephalomalacia.  09/02/2024 CT head without contrast, which I personally reviewed previously, shows right frontal encephalomalacia stable from prior.  08/28/2024 CTA head and neck & CT head without contrast, which I personally reviewed previously, show right frontal encephalomalacia stable from prior.  08/06/2024 CTA head & neck & CT head without contrast, which I personally reviewed previously, show right frontal encephalomalacia stable from prior.  07/09/2024 CT head without contrast, which I personally reviewed previously, shows stable right frontal encephalomalacia.  06/05/2024 MRI orbits with contrast, which I personally reviewed previously, shows right frontal encephalomalacia similar to prior imaging.  11/07/2023 MRI brain without contrast, by report from [facility], shows \"No acute intracranial abnormality including no evidence of an acute or recent brain parenchymal infarct. \"  11/07/2023 CTA head & neck & CT head without contrast, by report from [facility], show \"No acute abnormality of the cervical and intracranial vasculature.\" And \"No evidence of an acute intracranial process.  Focal RIGHT frontal lobe encephalomalacia deep to a sherley hole, new from 02/25/2020. \"  08/01/2023 MRV head, which I personally reviewed previously, shows no lesion.  07/29/2023 MRI brain & orbits with contrast, which I personally reviewed previously, shows right frontal encephalomalacia consistent " "with prior bolt.  Interval head imaging.  10/05/2022 CT head without contrast & CTA head & neck, by report from [place], show \" 1.   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  Overlying right-sided sherley hole.)  2.  Prominence of the ventricles and sulci for age.  \" and \"1. Similar appearing old areas of infarction involving the right and left frontal lobes extending to the convexities with underlying encephalomalacia at site of prior hemorrhage from 02/13/2022. Overlying right-sided sherley hole. Prominence of the ventricles and sulci for age. No evidence of acute infarction. No active hemorrhage. 2. No significant stenosis internal carotid arteries. 3. No significant stenosis of the intracranial vessels or evidence of aneurysm. 4. Aberrant right subclavian artery behind the esophagus with no dilation of the proximal esophagus.\"  09/25/2022 CT head without contrast, which I personally reviewed previously, shows no lesion.  04/20/2022 CT head without contrast, which I personally reviewed previously, shows right frontal encephalomalacia judged stable by Radiology.  Interval head imaging.  09/10/2021 MRI brain with contrast, which I personally reviewed previously, shows no lesion.  09/09/2021 CTA head & neck, which I personally reviewed previously, shows no lesion.  09/09/2021 CT head without contrast, which I personally reviewed previously, shows no lesion.  08/11/2021 MRI brain & orbits with contrast & MRV head, which I personally reviewed previously, show left optic atrophy with Radiology noting “Punctate focus of enhancement seen along the left 7th-8th cranial nerve bundle at the level of the porus acusticus, indeterminate. Otherwise unremarkable MRI of the brain.”  Interval head imaging.  06/04/2021 MRI brain & orbits with contrast, which I personally reviewed " previously, shows left optic atrophy.  Interval head imaging.  06/29/2017 MRI brain without contrast, which I personally reviewed previously, shows no lesion.  11/22/2007 CT head without contrast, which I personally reviewed previously, shows no lesion.    02/16/2025 lumbar puncture opening pressure 27 cm water, tube 1: RBC 33595, WBC 10, tube 4: RBC 84404 & WBC 17, protein 143 & glucose 87.  Interval cerebrospinal fluid (CSF) studies.  11/15/2024 RBC 3, WBC 1, protein 67 & glucose 92.  11/07/2024 , WBC 6, protein 69 & glucose 107.  09/18/2024 lumbar puncture tube 1: RBC 1000, WBC 6, tube 4:  & WBC 5, protein 79 & glucose 154. IgG studies with high albumin and albumin index with high cerebrospinal fluid (CSF) IgG/albumin ratio. Oligoclonal bands negative. Cytology negative. Flow cytometry negative. Encephalopathy autoimmune evaluation negative. Meningitis pathogen panel, HSV PCR, VZV PCR, EBV PCR, toxoplasma PCR, West Nile IgG & IgM, VDRL, fungal smear negative.  08/31/2023 lumbar puncture opening pressure 52 cm water, tube 1: , WBC 0, tube 4: RBC 6 & WBC 0, protein 91 & glucose 71.  08/11/2021 lumbar puncture opening pressure 18 cm water, tube 1: RBC 0, WBC 16 (86% L, 13% M), tube 4: RBC 0 & WBC 16 (92% L, 8% M), protein 71 & glucose 61. Cytology negative. Flow cytometry negative. Oligoclonal bands 0. IgG studies with high CSF IgG & albumin.  08/05/2020 lumbar puncture opening pressure 20 cm water, protein 68, glucose 85. MBP wnl. Oligoclonal bands POSITIVE 4.  12/26/2019 lumbar puncture no opening pressure checked per patient) tube ?: RBC 39, WBC 6 (91% L, 9% M), tube ?: RBC 38, WBC 5, protein 89, glucose 79. (via [employer] from [employer])  11/13/2019 lumbar puncture opening pressure 22 cm water, tube 1: RBC 9, WBC 4, tube 4: RBC 1 & WBC 5, protein 82 & glucose 61. Oligoclonal bands 0. IgG studies with non-specific abnormalities. HSV PCR negative.  09/20/2019 lumbar puncture opening pressure 20  cm water, RBC 1, WBC 7 (96% L, 4% M), protein 94 & glucose 78.  01/31/2015 lumbar puncture RBC 2, WBC 0, protein 104 & glucose 74.    08/18/2024 Electrodiagnostic testing.  ffERG: Normal (OU).  Responses of L-/M-cones and S-cones: No abnormality can be detected (OU).  Responses of On- and Off-bipolar cells in cone pathway: Normal (OU).  PhNR (RGC function):  OD: Amplitude reduces mildly and implicit time prolongs mildly.  OS: Amplitude reduces moderately and implicit time prolongs mildly.  mfERG: No abnormality can be found (OU).  PVEP:  OD: Normal.  OS: Amplitude reduces severely.  Hem[place]ield PVEP:  OD: Left-field PVEP amplitude is significantly lower than right-field PVEP.  OS: PVEP of both sides show no detectable components.  07/27/2019 multifocal ERG with mildly reduced central responses.  Pattern VEP with OD borderline latency at 0.25 degrees and OS with severely prolonged latencies and decreased amplitudes.    Lab Results   Component Value Date/Time    SEDRATE 31 (H) 02/15/2025 2014    SEDRATE 24 (H) 01/28/2025 1108    SEDRATE 35 (H) 01/22/2025 2357    SEDRATE 52 (H) 01/08/2025 1707    SEDRATE 40 (H) 12/09/2024 1615    SEDRATE 21 (H) 11/15/2024 0552    SEDRATE 36 (H) 11/06/2024 2258    SEDRATE 19 08/01/2023 1558    SEDRATE 33 (H) 08/07/2022 0322    SEDRATE 19 05/25/2022 1501    SEDRATE 3 06/05/2020 1110    SEDRATE 6 05/13/2020 1529    SEDRATE 3 03/28/2020 0629    SEDRATE 5 09/16/2019 0507    SEDRATE 8 08/19/2019 1314    CRP 0.82 03/27/2025 0258    CRP 0.16 02/15/2025 2014    CRP 0.34 01/28/2025 1108    CRP 0.43 01/22/2025 2357    CRP 0.54 01/08/2025 1707    CRP 1.00 (H) 12/09/2024 1615    CRP 1.19 (H) 11/15/2024 0552    CRP 1.36 (H) 11/06/2024 2258    CRP 0.90 08/07/2022 0322    CRP 0.35 05/25/2022 1501    CRP 0.34 09/23/2021 0618    CRP 0.71 09/21/2021 0648    CRP 0.14 07/16/2020 0944    CRP 0.10 06/05/2020 1110    CRP <0.10 05/13/2020 1529    CRP <0.10 03/28/2020 0629    CRP 3.64 (A) 11/21/2019 2157    CRP  <0.10 08/19/2019 1314      Lab Results   Component Value Date/Time    TKIEDUVY89 328 03/04/2025 0920    NKWVTUUS27 383 09/17/2024 0242    QJBMEHFT08 299 02/23/2023 0941    IAZCAUGH81 709 02/09/2021 0451    ZLFRDEWO40 508 12/10/2019 1349    FOLATE 20.8 03/04/2025 0920    FOLATE >24.0 09/17/2024 0242    RCFOL 951 12/10/2019 1349    GKLA2PL 168 03/04/2025 0920    IPWV3LV 142 09/17/2024 0250    VTAI Normal 09/17/2024 0242    VITAMINA 105 (H) 03/04/2025 0920    VITAMINA 0.53 09/17/2024 0242    VTAR 0.02 09/17/2024 0242      Lab Results   Component Value Date/Time    NMOAQP4 Negative 09/17/2024 0242    NMOAQP4 Negative 11/14/2019 1447    MOGFACS Negative 09/17/2024 0242    MOGFACS Negative 11/10/2020 1000    MOGFACS Negative 11/14/2019 1447    ACE 22 03/04/2025 0920      Tuberculosis studies  Lab Results   Component Value Date/Time    TBSIN Invalid 03/04/2025 0920    TBSIN Negative 09/17/2024 0250    TBSIN Negative 09/22/2021 0430    TBSIN Negative 11/10/2020 1000    TBSIN Negative 11/14/2019 0531      Lab Results   Component Value Date/Time    QZMUYZEY74 328 03/04/2025 0920    IMPIUWEM26 383 09/17/2024 0242    FNONCDQL35 299 02/23/2023 0941    CPANDLVA32 709 02/09/2021 0451    URDZYYBW18 508 12/10/2019 1349    FOLATE 20.8 03/04/2025 0920    FOLATE >24.0 09/17/2024 0242    RCFOL 951 12/10/2019 1349    CVAR3CH 168 03/04/2025 0920    SYAV5KN 142 09/17/2024 0250    VTAI Normal 09/17/2024 0242    VITAMINA 105 (H) 03/04/2025 0920    VITAMINA 0.53 09/17/2024 0242    VTAR 0.02 09/17/2024 0242      09/17/2024 syphilis REACTIVE. Treponema confirm & RPR non-reactive (NR). T-SPOT TB negative. Bartonella abs, Lyme PCR, RMSF abs, Toxoplasma IgG & IgM negative.  12/04/2023 ESR 10. CRP <0.3 mg/dL.  10/01/2023 syphilis non-reactive (NR).  09/18/2020 ESR 1. CRP < 0.08 mg/dL (0.8 mg/L).  08/28/2020 HbA1c HIGH 7.0. Lipid panel with LDL 64.  08/05/2020 B12 462. Folate wnl. AQP4 ab negative.  10/2019 Aure hereditary optic neuropathy testing  negative with Dr. Suarez.  07/09/2019 BRETT > 1:640. Anti-centromere HIGH 101 (0-40). scl-70 negative. Chromatin ab IgG, anti-ribosomal ab, anti-Sarahy ab, anti-DNA ab negative.    04/04/2025 OCT RNFL OD 41 & OS 38. (Stable to decrease)  02/20/2025 OCT RNFL OD 46 & OS 41. (Stable)  12/16/2024 OCT RNFL OD 48 & OS 38.  11/05/2024 OCT RNFL OD 81 with T & I thinning & OS 37. (Lower OD & stable OS)  10/23/2024 OCT RNFL  & OS 41. (Lower OD & stable OS)  10/03/2024 OCT RNFL  & OS 39. (Lower OD & stable OS)  09/16/2024 OCT RNFL  & OS 36. (Interval new elevation OD & stable thinning OS)  07/25/2024 OCT RNFL OD 89 & OS 37. (Decrease, now at baseline of early 2024)  06/26/2024 OCT RNFL OD 95 & OS 41. (Stable)  06/05/2024 OCT RNFL OD 95 & OS 40. (Stable)  OCT macula OD normal foveal contour 264 & OS normal foveal contour 234.  03/15/2024 OCT RNFL OD 92 & OS 36. (Stable)  01/09/2024 OCT RNFL OD 89 & OS 36. (Stable)  02/06/2023 OCT RNFL OD 92 & OS 38. (stable)  OCT macula OD normal foveal contour 255 & OS thinning RNFL miscentered wrt fovea.  10/07/2022 OCT RNFL OD 92 & OS 40. (decreased OD & stable OS)  09/23/2022 OCT RNFL OD 98 & OS 38. (increased OD & stable OS)  01/27/2022 OCT RNFL OD 88 & OS 37. (stable)  10/06/2021 OCT RNFL OD 93 & OS 39 (stable)..  08/19/2021 OCT RNFL OD 92 & OS 38. (stable)  06/08/2021 OCT RNFL OD 87 & OS 37. (stable)  01/28/2021 OCT RNFL OD 85 & OS 37. (stable)  11/06/2020 OCT RNFL OD 89 & OS 39. (stable)  07/27/2020 OCT RNFL OD 89 & OS 38. (stable)  01/07/2020 OCT RNFL OD 93 & OS 38. (stable to mild increase OD, stable to mild decrease OS)  11/27/2019 OCT RNFL OD 84 & OS 42. (stable to mild OD decrease)  09/30/2019 OCT RNFL OD 89 & OS 41. (stable)  07/18/2019 OCT RNFL OD 90 & OS 39.  OCT macula OD normal foveal contour 256 & OS thinning of RNFL & GCL, but preserved foveal contour 238.    04/04/2025 HVF 24-2 OD stimulus V, fovea <0, generalized depression with superior temporal relative  sparing & OS stimulus V, fovea <0, generalized depression.  12/16/2024 HVF 24-2 OD stimulus V, fovea <0, FL 7/12, FL 0/6, FN 1/5, generalized depression with some relative superior temporal sparing & OS stimulus V, fovea 9, FL 0/16, FP 0/10, FN 6/9, generalized depression.  11/05/2024 HVF 24-2 OD stimulus V, fovea <0, inferior altitudinal & superior arcuate (some superior temporal sparing) & OS stimulus V, fovea 22, central, superior nasal step & inferior arcuate defects.  10/03/2024 HVF 24-2 OD fovea 4, generalized depression MD -29.75 & OS stimulus V, fovea 12, central, superior nasal step & inferior arcuate defects.  09/16/2024 HVF 24-2 OD stimulus III, fovea 14, generalized depression MD -31.43 & OS stimulus V, fovea 1, generalized depression with some superior temporal sparing.  07/25/2024 HVF 24-2 OD stimulus V, fovea 39, superior > inferior nasal step & OS stimulus V, fovea 18, generalized depression with some superior temporal sparing (inferior > superior altitudinal.  06/26/2024 HVF 24-2 OD fovea 33, FL 3/15, FP 0%, FN 20%, superior arcuate MD -13.69 & OS stimulus V, fovea 27, generalized depression.  06/05/2024 HVF 24-2 OD fovea 34, FL 2/16, FP 0%, FN 40%, superior altitudinal MD -18.00 & OS stimulus V, fovea 25, generalized depression.  03/15/2024 HVF 24-2 OD fovea 36, wnl MD -1.05 & OS stimulus V, fovea 3, inferior arcuate > superior arcuate.  01/09/2024 HVF 24-2 OD wnl MD -0.94 & OS generalized depression MD -32.10.  02/06/2023 HVF 24-2 OD fovea 36, FL 1/17, FP 0%< FN 15%, scatter MD -7.58 & OS stimulus V, fovea 19, generalized depression.  10/07/2022 HVF 24-2 OD fovea 35, wnl MD -1.26 & OS fovea 5, generalized depression MD -30.62.  09/23/2022 HVF 24-2 OD fovea 38, scatter MD -2.67 & OS stimulus V, fovea 16, superior arcuate & inferior arcuate.  01/27/2022 HVF 24-2 OD fovea 36, wnl MD -1.67 & OS stimulus V, fovea 28, generalized reduction.  10/06/2021 HVF 24-2 OD fovea 36, scatter MD -4.76 & OS  stimulus V, generalized reduction.  08/19/2021 HVF 24-2 OD fovea 39, wnl MD -2.31 & OS stimulus V, fovea 28, generalized depression.  06/08/2021 HVF Stimulus V OD fovea 34, wnl & OS stimulus V, fovea 24, generalized depression.  01/28/2021 HVF 24-2 OD fovea 40, wnl MD -0.63 & OS stimulus V, fovea 28, superior nasal step & inferior arcuate.  11/06/2020 HVF 24-2 OD fovea 32, possible inferior > superior arcuate MD -14.71 & OS stimulus V, fovea 10, generalized depression.  07/27/2020 HVF 24-2 OD fovea 39, wnl MD -2.45 & OS stimulus V, fovea 27, superior & inferior altitudinal.    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    Near vision remains stable with limited peripheral vision and continued optic atrophy. I do not see papilledema to indicate ventriculoperitoneal shunt failure. Vision loss appears overall stable consistent with residual loss I attribute to non-arteritic anterior ischemic optic neuropathy and post-papilledema effects of idiopathic intracranial hypertension.    Iron deficiency anemia can worsen intracranial pressure (ICP) elevation. Treating with infusions is reasonable.    Plan    Shunt care with Dr. Tafoya.  From furosemide 20 mg PO BID, taper to 10 mg PO BID with possible further reduction.  Vasculopathic risk factor control.  Low vision services.    Follow up in about 3 months with near vision including pinhole, Sharpe visual field (HVF) & OCT. (dilated 6/5/2024)

## 2025-04-07 ENCOUNTER — PATIENT MESSAGE (OUTPATIENT)
Dept: ENDOCRINOLOGY | Facility: CLINIC | Age: 50
End: 2025-04-07
Payer: MEDICAID

## 2025-04-07 ENCOUNTER — TELEPHONE (OUTPATIENT)
Dept: ENDOCRINOLOGY | Facility: CLINIC | Age: 50
End: 2025-04-07
Payer: MEDICAID

## 2025-04-07 DIAGNOSIS — E11.65 UNCONTROLLED TYPE 2 DIABETES MELLITUS WITH HYPERGLYCEMIA: ICD-10-CM

## 2025-04-07 RX ORDER — ROPINIROLE 0.5 MG/1
TABLET, FILM COATED ORAL
OUTPATIENT
Start: 2025-04-07

## 2025-04-07 RX ORDER — EZETIMIBE 10 MG/1
TABLET ORAL
Qty: 90 TABLET | Refills: 2 | Status: SHIPPED | OUTPATIENT
Start: 2025-04-07

## 2025-04-07 NOTE — TELEPHONE ENCOUNTER
Left voicemail informing patient of refill denial for Ropinirole. Patient encouraged to reach out to specialist or primary care provider. Office number given for a call back in case of any questions or concerns.

## 2025-04-08 ENCOUNTER — SPECIALTY PHARMACY (OUTPATIENT)
Dept: PHARMACY | Facility: CLINIC | Age: 50
End: 2025-04-08

## 2025-04-08 PROCEDURE — RXMED WILLOW AMBULATORY MEDICATION CHARGE

## 2025-04-09 ENCOUNTER — PHARMACY VISIT (OUTPATIENT)
Dept: PHARMACY | Facility: CLINIC | Age: 50
End: 2025-04-09
Payer: MEDICAID

## 2025-04-10 ENCOUNTER — PATIENT OUTREACH (OUTPATIENT)
Dept: PRIMARY CARE | Facility: CLINIC | Age: 50
End: 2025-04-10
Payer: MEDICAID

## 2025-04-10 ENCOUNTER — DOCUMENTATION (OUTPATIENT)
Dept: PRIMARY CARE | Facility: CLINIC | Age: 50
End: 2025-04-10
Payer: MEDICAID

## 2025-04-17 ENCOUNTER — APPOINTMENT (OUTPATIENT)
Dept: NUTRITION | Facility: HOSPITAL | Age: 50
End: 2025-04-17
Payer: MEDICAID

## 2025-04-21 ENCOUNTER — APPOINTMENT (OUTPATIENT)
Dept: RADIOLOGY | Facility: HOSPITAL | Age: 50
End: 2025-04-21
Payer: MEDICAID

## 2025-04-21 ENCOUNTER — HOSPITAL ENCOUNTER (EMERGENCY)
Facility: HOSPITAL | Age: 50
Discharge: HOME | End: 2025-04-22
Payer: MEDICAID

## 2025-04-21 DIAGNOSIS — R51.9 ACUTE NONINTRACTABLE HEADACHE, UNSPECIFIED HEADACHE TYPE: Primary | ICD-10-CM

## 2025-04-21 LAB
ALBUMIN SERPL BCP-MCNC: 3.9 G/DL (ref 3.4–5)
ALP SERPL-CCNC: 93 U/L (ref 33–110)
ALT SERPL W P-5'-P-CCNC: 17 U/L (ref 7–45)
ANION GAP SERPL CALC-SCNC: 13 MMOL/L (ref 10–20)
APTT PPP: 41 SECONDS (ref 26–36)
AST SERPL W P-5'-P-CCNC: 15 U/L (ref 9–39)
BASOPHILS # BLD AUTO: 0.02 X10*3/UL (ref 0–0.1)
BASOPHILS NFR BLD AUTO: 0.3 %
BILIRUB SERPL-MCNC: 0.2 MG/DL (ref 0–1.2)
BUN SERPL-MCNC: 22 MG/DL (ref 6–23)
CALCIUM SERPL-MCNC: 9.1 MG/DL (ref 8.6–10.6)
CHLORIDE SERPL-SCNC: 103 MMOL/L (ref 98–107)
CO2 SERPL-SCNC: 26 MMOL/L (ref 21–32)
CREAT SERPL-MCNC: 0.95 MG/DL (ref 0.5–1.05)
EGFRCR SERPLBLD CKD-EPI 2021: 75 ML/MIN/1.73M*2
EOSINOPHIL # BLD AUTO: 0.03 X10*3/UL (ref 0–0.7)
EOSINOPHIL NFR BLD AUTO: 0.5 %
ERYTHROCYTE [DISTWIDTH] IN BLOOD BY AUTOMATED COUNT: 17.5 % (ref 11.5–14.5)
GLUCOSE BLD MANUAL STRIP-MCNC: 88 MG/DL (ref 74–99)
GLUCOSE SERPL-MCNC: 163 MG/DL (ref 74–99)
HCT VFR BLD AUTO: 34.4 % (ref 36–46)
HGB BLD-MCNC: 10.9 G/DL (ref 12–16)
HOLD SPECIMEN: NORMAL
HOLD SPECIMEN: NORMAL
IMM GRANULOCYTES # BLD AUTO: 0.03 X10*3/UL (ref 0–0.7)
IMM GRANULOCYTES NFR BLD AUTO: 0.5 % (ref 0–0.9)
INR PPP: 1 (ref 0.9–1.1)
LYMPHOCYTES # BLD AUTO: 1.54 X10*3/UL (ref 1.2–4.8)
LYMPHOCYTES NFR BLD AUTO: 23.4 %
MCH RBC QN AUTO: 27.6 PG (ref 26–34)
MCHC RBC AUTO-ENTMCNC: 31.7 G/DL (ref 32–36)
MCV RBC AUTO: 87 FL (ref 80–100)
MONOCYTES # BLD AUTO: 0.41 X10*3/UL (ref 0.1–1)
MONOCYTES NFR BLD AUTO: 6.2 %
NEUTROPHILS # BLD AUTO: 4.56 X10*3/UL (ref 1.2–7.7)
NEUTROPHILS NFR BLD AUTO: 69.1 %
NRBC BLD-RTO: 0 /100 WBCS (ref 0–0)
PLATELET # BLD AUTO: 284 X10*3/UL (ref 150–450)
POTASSIUM SERPL-SCNC: 3.9 MMOL/L (ref 3.5–5.3)
PROT SERPL-MCNC: 7.3 G/DL (ref 6.4–8.2)
PROTHROMBIN TIME: 10.7 SECONDS (ref 9.8–12.4)
RBC # BLD AUTO: 3.95 X10*6/UL (ref 4–5.2)
SODIUM SERPL-SCNC: 138 MMOL/L (ref 136–145)
WBC # BLD AUTO: 6.6 X10*3/UL (ref 4.4–11.3)

## 2025-04-21 PROCEDURE — 96375 TX/PRO/DX INJ NEW DRUG ADDON: CPT

## 2025-04-21 PROCEDURE — 82947 ASSAY GLUCOSE BLOOD QUANT: CPT

## 2025-04-21 PROCEDURE — 85730 THROMBOPLASTIN TIME PARTIAL: CPT | Performed by: EMERGENCY MEDICINE

## 2025-04-21 PROCEDURE — 74018 RADEX ABDOMEN 1 VIEW: CPT | Performed by: STUDENT IN AN ORGANIZED HEALTH CARE EDUCATION/TRAINING PROGRAM

## 2025-04-21 PROCEDURE — 71045 X-RAY EXAM CHEST 1 VIEW: CPT

## 2025-04-21 PROCEDURE — 99285 EMERGENCY DEPT VISIT HI MDM: CPT | Mod: 25

## 2025-04-21 PROCEDURE — 71045 X-RAY EXAM CHEST 1 VIEW: CPT | Performed by: STUDENT IN AN ORGANIZED HEALTH CARE EDUCATION/TRAINING PROGRAM

## 2025-04-21 PROCEDURE — 80053 COMPREHEN METABOLIC PANEL: CPT | Performed by: EMERGENCY MEDICINE

## 2025-04-21 PROCEDURE — 2500000001 HC RX 250 WO HCPCS SELF ADMINISTERED DRUGS (ALT 637 FOR MEDICARE OP): Mod: SE

## 2025-04-21 PROCEDURE — 85610 PROTHROMBIN TIME: CPT

## 2025-04-21 PROCEDURE — 2500000004 HC RX 250 GENERAL PHARMACY W/ HCPCS (ALT 636 FOR OP/ED): Mod: SE

## 2025-04-21 PROCEDURE — 85025 COMPLETE CBC W/AUTO DIFF WBC: CPT

## 2025-04-21 PROCEDURE — 96365 THER/PROPH/DIAG IV INF INIT: CPT

## 2025-04-21 PROCEDURE — 96361 HYDRATE IV INFUSION ADD-ON: CPT

## 2025-04-21 PROCEDURE — 70450 CT HEAD/BRAIN W/O DYE: CPT

## 2025-04-21 PROCEDURE — 70250 X-RAY EXAM OF SKULL: CPT | Performed by: STUDENT IN AN ORGANIZED HEALTH CARE EDUCATION/TRAINING PROGRAM

## 2025-04-21 PROCEDURE — 96374 THER/PROPH/DIAG INJ IV PUSH: CPT | Mod: 59

## 2025-04-21 PROCEDURE — 85025 COMPLETE CBC W/AUTO DIFF WBC: CPT | Performed by: EMERGENCY MEDICINE

## 2025-04-21 PROCEDURE — 70450 CT HEAD/BRAIN W/O DYE: CPT | Performed by: RADIOLOGY

## 2025-04-21 PROCEDURE — 80053 COMPREHEN METABOLIC PANEL: CPT

## 2025-04-21 RX ORDER — DIPHENHYDRAMINE HYDROCHLORIDE 50 MG/ML
25 INJECTION, SOLUTION INTRAMUSCULAR; INTRAVENOUS ONCE
Status: COMPLETED | OUTPATIENT
Start: 2025-04-21 | End: 2025-04-21

## 2025-04-21 RX ORDER — MAGNESIUM SULFATE 1 G/100ML
1 INJECTION INTRAVENOUS ONCE
Status: COMPLETED | OUTPATIENT
Start: 2025-04-21 | End: 2025-04-21

## 2025-04-21 RX ORDER — HEPARIN 100 UNIT/ML
5 SYRINGE INTRAVENOUS ONCE
Status: COMPLETED | OUTPATIENT
Start: 2025-04-21 | End: 2025-04-21

## 2025-04-21 RX ORDER — LEVETIRACETAM 500 MG/1
1500 TABLET ORAL ONCE
Status: COMPLETED | OUTPATIENT
Start: 2025-04-21 | End: 2025-04-21

## 2025-04-21 RX ORDER — DEXAMETHASONE SODIUM PHOSPHATE 10 MG/ML
10 INJECTION INTRAMUSCULAR; INTRAVENOUS ONCE
Status: COMPLETED | OUTPATIENT
Start: 2025-04-21 | End: 2025-04-21

## 2025-04-21 RX ORDER — KETOROLAC TROMETHAMINE 15 MG/ML
15 INJECTION, SOLUTION INTRAMUSCULAR; INTRAVENOUS ONCE
Status: COMPLETED | OUTPATIENT
Start: 2025-04-21 | End: 2025-04-21

## 2025-04-21 RX ORDER — ONDANSETRON HYDROCHLORIDE 2 MG/ML
4 INJECTION, SOLUTION INTRAVENOUS ONCE
Status: COMPLETED | OUTPATIENT
Start: 2025-04-21 | End: 2025-04-21

## 2025-04-21 RX ORDER — LACOSAMIDE 100 MG/1
300 TABLET ORAL ONCE
Status: COMPLETED | OUTPATIENT
Start: 2025-04-21 | End: 2025-04-21

## 2025-04-21 RX ADMIN — LEVETIRACETAM 1500 MG: 500 TABLET, FILM COATED ORAL at 21:40

## 2025-04-21 RX ADMIN — LACOSAMIDE 300 MG: 100 TABLET, FILM COATED ORAL at 21:40

## 2025-04-21 RX ADMIN — KETOROLAC TROMETHAMINE 15 MG: 15 INJECTION, SOLUTION INTRAMUSCULAR; INTRAVENOUS at 18:27

## 2025-04-21 RX ADMIN — MAGNESIUM SULFATE HEPTAHYDRATE 1 G: 1 INJECTION, SOLUTION INTRAVENOUS at 20:26

## 2025-04-21 RX ADMIN — SODIUM CHLORIDE 1000 ML: 0.9 INJECTION, SOLUTION INTRAVENOUS at 21:44

## 2025-04-21 RX ADMIN — ONDANSETRON 4 MG: 2 INJECTION INTRAMUSCULAR; INTRAVENOUS at 18:27

## 2025-04-21 RX ADMIN — DEXAMETHASONE SODIUM PHOSPHATE 10 MG: 10 INJECTION INTRAMUSCULAR; INTRAVENOUS at 20:26

## 2025-04-21 RX ADMIN — HEPARIN 500 UNITS: 100 SYRINGE at 22:53

## 2025-04-21 RX ADMIN — DIPHENHYDRAMINE HYDROCHLORIDE 25 MG: 50 INJECTION INTRAMUSCULAR; INTRAVENOUS at 18:27

## 2025-04-21 ASSESSMENT — PAIN SCALES - GENERAL
PAINLEVEL_OUTOF10: 8
PAINLEVEL_OUTOF10: 7
PAINLEVEL_OUTOF10: 8

## 2025-04-21 ASSESSMENT — PAIN DESCRIPTION - LOCATION: LOCATION: HEAD

## 2025-04-21 ASSESSMENT — PAIN - FUNCTIONAL ASSESSMENT: PAIN_FUNCTIONAL_ASSESSMENT: 0-10

## 2025-04-21 ASSESSMENT — PAIN DESCRIPTION - ORIENTATION: ORIENTATION: RIGHT

## 2025-04-21 NOTE — ED TRIAGE NOTES
Pt presents to ED stating she is having issues with both ears and pressure behind both eyes and nose bleeds. No thinners, bleeding controlled upon arrival. Pt has a  Shunt. Pt is visually impaired

## 2025-04-21 NOTE — ED PROVIDER NOTES
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  Since Thursday, patient has had an ongoing right-sided headache and pressure behind both of her eyes.  She has not had relief with over-the-counter medications at home.  She also has a full sensation in her ear with diminished hearing.  This is similar to previous complaints she has had when she is concerned about her shunt.  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  She denies fevers, runny nose, congestion, cough, chest pain, worsening shortness of breath, abdominal pain, black or bloody stools.  She has had mild nausea without vomiting.    Nursing Notes were reviewed.    PAST MEDICAL HISTORY   Medical History[1]      SURGICAL HISTORY     Surgical History[2]      CURRENT MEDICATIONS       Previous Medications    ACETAMINOPHEN (TYLENOL) 325 MG TABLET    Take 1-2 tablets (325-650 mg) by mouth every 6 hours if needed for mild pain (1 - 3). Days of infusions    ADVAIR -21 MCG/ACTUATION INHALER    INHALE TWO PUFFS BY MOUTH AS INSTRUCTED TWO TIMES A DAY.    AMITRIPTYLINE (ELAVIL) 100 MG TABLET    Take 1 tablet (100 mg) by mouth once daily at bedtime.    AZELASTINE (ASTELIN) 137 MCG (0.1 %) NASAL SPRAY    Administer 1 spray into each nostril 2 times a day. Use in each nostril as directed    BLOOD-GLUCOSE SENSOR (DEXCOM  G7 SENSOR MISC)    Use to check blood sugar continuously throughout the day as directed. Change sensor every 10 days.    CALCIUM-VITAMIN D3-VITAMIN K (VIACTIV) 650 MG-12.5 MCG-40 MCG CHEWABLE TABLET    Chew 2 tablets once daily. At lunch    CARBOXYMETHYLCELLULOSE (REFRESH CELLUVISC) 1 % OPHTHALMIC SOLUTION DROPPERETTE    Administer 2 drops into both eyes 3 times a day as needed for dry eyes.    CHOLECALCIFEROL (VITAMIN D-3) 5,000 UNITS TABLET    Take 1 tablet (5,000 Units) by mouth once daily.    CLONAZEPAM (KLONOPIN) 0.5 MG TABLET    Take 1 tablet (0.5 mg) by mouth 2 times a day.    DEXCOM G7  MISC    Use as instructed to check blood sugars continuously throughout the day    DIVALPROEX (DEPAKOTE ER) 500 MG 24 HR TABLET    Take 1 tablet (500 mg) by mouth 2 times a day.    EPINEPHRINE 0.3 MG/0.3 ML INJECTION SYRINGE    INJECT INTRAMUSCULARLY ONCE AS NEEDED FOR ANAPHYLAXIS    EZETIMIBE (ZETIA) 10 MG TABLET    TAKE ONE TABLET BY MOUTH ONCE DAILY    EZETIMIBE (ZETIA) 10 MG TABLET    Take 1 tablet once daily    FLUCONAZOLE (DIFLUCAN) 150 MG TABLET    1 tablet by mouth daily spread 72 hours apart    FLUTICASONE (FLONASE) 50 MCG/ACTUATION NASAL SPRAY    INSTILL ONE SPRAY INTO EACH NOSTRIL ONCE DAILY    FOLIC ACID (FOLVITE) 1 MG TABLET    Take 1 tablet (1 mg) by mouth once daily.    FUROSEMIDE (LASIX) 20 MG TABLET    Take 0.5 tablets (10 mg) by mouth 2 times a day.    GLOVES, LATEX WITH ALOE VERA MISC    Large size gloves    GLUCAGON (BAQSIMI) 3 MG/ACTUATION SPRAY,NON-AEROSOL    USE ONE SPRAY IN THE NOSE AS NEEDED FOR LOW BLOOD SUGAR  MAY REPEAT AFTER 15 MINUTES USING A NEW DEVICE IF NO RESPONSE    GLUCAGON (GLUCAGEN) 1 MG INJECTION    Inject 1 mg under the skin 1 time if needed for low blood sugar - see comments (hypoglycemia).    HYDROCORTISONE (CORTEF) 10 MG TABLET    Take 1 tablet (10 mg) by mouth 2 times a day. Double the dose if sick for three days    IMMUNE GLOBULIN, HUMAN, (GAMMAGARD) INFUSION    Infuse 600  mL (60 g) into a venous catheter every 14 (fourteen) days.    INSULIN GLARGINE (TOUJEO MAX SOLOSTAR- 2 UNIT DIAL) 300 UNIT/ML (3 ML) PEN    Inject 54 units under the skin every morning    INSULIN LISPRO (HUMALOG KWIKPEN INSULIN) 100 UNIT/ML INJECTION    Use as directed to give up to 100 units a day    IPRATROPIUM-ALBUTEROL (DUO-NEB) 0.5-2.5 MG/3 ML NEBULIZER SOLUTION    Take 3 mL by nebulization every 6 hours if needed for wheezing.    LACOSAMIDE (VIMPAT) 150 MG TABLET TABLET    Take 2 tablets (300 mg) by mouth 2 times a day.    LASMIDITAN (REYVOW) 100 MG TABLET    Take 1 tablet (100 mg) by mouth if needed for migraine. Do not drive in 8 hours of taking this medicine. Maximum 1 dose per 24 hours.    LEVETIRACETAM (KEPPRA) 750 MG TABLET    Take 2 tablets (1,500 mg) by mouth 2 times a day.    LEVOTHYROXINE (SYNTHROID, LEVOXYL) 50 MCG TABLET    TAKE 1 & 1/2 TABLETS (75 MCG) BY MOUTH ONCE DAILY IN THE MORNING. TAKE BEFORE MEALS.    LOSARTAN (COZAAR) 25 MG TABLET    Take 1 tablet (25 mg) by mouth once daily.    METHYLPREDNISOLONE (MEDROL) 4 MG TABLET    8 tabs daily 5 days , 7 tabs daily 5 days , 6 tabs daily 5 days , 5 tabs daily 5 days , 4 tabs daily 5 days , 3 tablets daily 5 days 2 tabs daily 5 days , 1 tab daily 5 days per [facility].    MICONAZOLE (MICOTIN) 2 % CREAM    Apply 1 Application topically once daily as needed (rash).    MICONAZOLE (MICOTIN) 2 % POWDER    Apply to groin , under the breast and skin folds daily    MOISTURIZING MOUTH (BIOTENE ORAL DRY MOUTH) SOLUTION    Take 1 spray by mouth 4 times a day as needed.    MONTELUKAST (SINGULAIR) 10 MG TABLET    TAKE ONE TABLET BY MOUTH EVERY DAY AT BEDTIME    MOTEGRITY 2 MG TABLET    Take 1 tablet (2 mg) by mouth once daily.    NASAL SPRAY NAYZILAM 5 MG/SPRAY (0.1 ML) SPRAY,NON-AEROSOL    Administer into affected nostril(s) 1 time.    ONDANSETRON ODT (ZOFRAN-ODT) 8 MG DISINTEGRATING TABLET    Dissolve 1 tablet (8 mg) in the mouth every 8  "hours if needed for nausea or vomiting.    ONETOUCH DELICA PLUS LANCET 33 GAUGE MISC    USE 1 LANCET TO CHECK GLUCOSE ONCE DAILY AS DIRECTED    ONETOUCH VERIO TEST STRIPS STRIP    USE 1 STRIP TO CHECK GLUCOSE ONCE DAILY AS DIRECTED    PANTOPRAZOLE (PROTONIX) 40 MG EC TABLET    Take 1 tablet (40 mg) by mouth 2 times a day before meals.    PEN NEEDLE, DIABETIC (BD ROSALEE 2ND GEN PEN NEEDLE) 32 GAUGE X 5/32\" NEEDLE    Use as directed with insulin pen up to 5 times daily    POLYETHYLENE GLYCOL (GLYCOLAX, MIRALAX) 17 GRAM/DOSE POWDER    Mix 17 g of powder and drink 3 times a day as needed for constipation.    PROPRANOLOL LA (INDERAL LA) 60 MG 24 HR CAPSULE    Take 1 capsule (60 mg) by mouth once daily. Do not crush, chew, or split.    ROPINIROLE (REQUIP) 0.5 MG TABLET    Take 1 tablet by mouth (0.5mg) in the morning and 2 tablets (1mg) by mouth in the evening    SENNA 8.6 MG TABLET    Take 1 tablet (8.6 mg) by mouth once daily at bedtime.    TIZANIDINE (ZANAFLEX) 2 MG TABLET    Take 1 tablet (2 mg) by mouth 2 times a day.    UBROGEPANT (UBRELVY) 100 MG TABLET    Take 1 tablet (100 mg) by mouth if needed (migraine). May repeat in 2 hours for max of 200mg per 24 hours.    ZINC ORAL    Take 50 mg by mouth once daily.       ALLERGIES     Acetazolamide, Atorvastatin, Cefdinir, Ceftriaxone, Doxepin, Duloxetine, Fd and c red no.40, Levofloxacin, Levofloxacin in d5w, Nutritional supplements, Ozempic [semaglutide], Prochlorperazine, Red dye, Rosemary, Rosemary oil, Strawberry, Sulfa (sulfonamide antibiotics), Topiramate, Tree pollen-black walnut, Tree pollen-pecan, [place], Aripiprazole, Aspartame, Aspartame (bulk), Fenofibrate, Hydrochlorothiazide, Hydromorphone, Iron, Meclizine, Metformin, Propoxyphene, Statins-hmg-coa reductase inhibitors, Tetracyclines, Thiazides, Venlafaxine, Adhesive tape-silicones, Barbiturates, Ciprofloxacin, Dhe, Diet foods, Farxiga [dapagliflozin], Ferrous sulfate, L.acidoph-l.bulg-b.bif-s.therm, Nsaids " (non-steroidal anti-inflammatory drug), Other, Sulfonylureas, Tobramycin, Vancomycin, Adhesive, Azithromycin, Betamethasone, Cephalexin, House dust, Metoclopramide hcl, Prednisone, Propoxyphene-acetaminophen, Sulfacetamide sodium, Kdihyrkz-3-ce8 antimigraine agents, and Zolpidem    FAMILY HISTORY     Family History[3]       SOCIAL HISTORY     Social History[4]    SCREENINGS                        PHYSICAL EXAM    (up to 7 for level 4, 8 or more for level 5)     ED Triage Vitals [04/21/25 1351]   Temperature Heart Rate Respirations BP   36.2 °C (97.2 °F) (!) 102 17 (!) 209/116      Pulse Ox Temp Source Heart Rate Source Patient Position   95 % Temporal Monitor Sitting      BP Location FiO2 (%)     Left arm --       Physical Exam  Constitutional:       General: She is not in acute distress.     Appearance: She is not ill-appearing.   HENT:      Head: Normocephalic and atraumatic.      Nose: Nose normal.      Mouth/Throat:      Mouth: Mucous membranes are moist.      Pharynx: Oropharynx is clear.   Eyes:      Extraocular Movements: Extraocular movements intact.      Conjunctiva/sclera: Conjunctivae normal.   Cardiovascular:      Rate and Rhythm: Normal rate and regular rhythm.      Heart sounds: Normal heart sounds.   Pulmonary:      Effort: Pulmonary effort is normal. No respiratory distress.      Breath sounds: Normal breath sounds.   Abdominal:      General: There is no distension.      Palpations: Abdomen is soft.      Tenderness: There is no abdominal tenderness.   Musculoskeletal:         General: No deformity or signs of injury.      Cervical back: Normal range of motion and neck supple.   Skin:     General: Skin is warm and dry.      Capillary Refill: Capillary refill takes less than 2 seconds.   Neurological:      General: No focal deficit present.      Comments: Reports mild diminished sensation to the left side of her face but otherwise no focal complaints.  She has 5/5 strength in the bilateral upper and  lower extremities.  No other sensation loss.  No aphasia or neglect.          DIAGNOSTIC RESULTS     LABS:  Labs Reviewed   COAGULATION SCREEN - Abnormal       Result Value    Protime 10.7      INR 1.0      aPTT 41 (*)     Narrative:     The APTT is no longer used for monitoring Unfractionated Heparin Therapy. For monitoring Heparin Therapy, use the Heparin Assay.   COMPREHENSIVE METABOLIC PANEL - Abnormal    Glucose 163 (*)     Sodium 138      Potassium 3.9      Chloride 103      Bicarbonate 26      Anion Gap 13      Urea Nitrogen 22      Creatinine 0.95      eGFR 75      Calcium 9.1      Albumin 3.9      Alkaline Phosphatase 93      Total Protein 7.3      AST 15      Bilirubin, Total 0.2      ALT 17     CBC WITH AUTO DIFFERENTIAL - Abnormal    WBC 6.6      nRBC 0.0      RBC 3.95 (*)     Hemoglobin 10.9 (*)     Hematocrit 34.4 (*)     MCV 87      MCH 27.6      MCHC 31.7 (*)     RDW 17.5 (*)     Platelets 284      Neutrophils % 69.1      Immature Granulocytes %, Automated 0.5      Lymphocytes % 23.4      Monocytes % 6.2      Eosinophils % 0.5      Basophils % 0.3      Neutrophils Absolute 4.56      Immature Granulocytes Absolute, Automated 0.03      Lymphocytes Absolute 1.54      Monocytes Absolute 0.41      Eosinophils Absolute 0.03      Basophils Absolute 0.02     POCT GLUCOSE - Normal    POCT Glucose 88         All other labs were within normal range or not returned as of this dictation.    Imaging  CT head wo IV contrast   Final Result   1. Negative head CT for acute change.   2. Very small, bilateral hygromas are suspected. These measure on the   order of at most 3 mm.        MACRO:   None        Signed by: Erwin Barrett 4/21/2025 6:38 PM   Dictation workstation:   [id number]      XR shunt series   Final Result   1.  Right frontal ventriculostomy shunt catheter, as described above.   2.  Mild central opacities, may represent pulmonary edema versus   compressive changes.   3.  Nonobstructive bowel gas pattern.         Signed by: Boo Davis 4/21/2025 6:16 PM   Dictation workstation:   [id number]           Procedures  Procedures     EMERGENCY DEPARTMENT COURSE/MDM:     Diagnoses as of 04/21/25 5034   Acute nonintractable headache, unspecified headache type        Medical Decision Making  History obtained from the patient.  Records including labs, imaging, notes independently reviewed by me.  Presentation concerning for possible trauma infection versus ongoing known headaches.  Basic labs had already been obtained in triage.  CMP with mild hyperglycemia of 163 thought to be clinically noncontributory.  CBC with stable chronic anemia of hemoglobin 10.9.  No leukocytosis.  INR within normal limits.  Shunt series unremarkable.  Patient initially given Zofran, Toradol, Benadryl with only mild improvement in her headache but with resolution of her nausea.  She was then given Decadron, magnesium, IV fluids with further improvement.  She was also given her scheduled nighttime Vimpat and Keppra.  CT scan of the head on 1 had said there was no acute change but then also said that there were multiple bilateral small hygromas.  On review of her chart, there is no mention of hygromas on her previous CT scans or anywhere else in the chart.  Case was discussed with neurosurgery who stated that they would come talk to the patient.  Their recommendations were pending at the time of signout to the oncoming provider.    Patient and or family in agreement and understanding of treatment plan.  All questions answered.      I reviewed the case with the attending ED physician. The attending ED physician agrees with the plan. Patient and/or patient´s representative was counseled regarding labs, imaging, likely diagnosis, and plan. All questions were answered.    ED Medications administered this visit:    Medications   ketorolac (Toradol) injection 15 mg (15 mg intravenous Given 4/21/25 1827)   diphenhydrAMINE (BENADryl) injection 25 mg (25 mg  intravenous Given 4/21/25 1827)   ondansetron (Zofran) injection 4 mg (4 mg intravenous Given 4/21/25 1827)   magnesium sulfate 1 g in dextrose 5%  mL (0 g intravenous Stopped 4/21/25 2144)   sodium chloride 0.9 % bolus 1,000 mL (0 mL intravenous Stopped 4/21/25 2246)   dexAMETHasone (Decadron) injection 10 mg (10 mg intravenous Given 4/21/25 2026)   lacosamide (Vimpat) tablet 300 mg (300 mg oral Given 4/21/25 2140)   levETIRAcetam (Keppra) tablet 1,500 mg (1,500 mg oral Given 4/21/25 2140)   heparin flush 100 unit/mL syringe 500 Units (500 Units intra-catheter Given 4/21/25 2253)       New Prescriptions from this visit:    New Prescriptions    No medications on file       Follow-up:  No follow-up provider specified.      Final Impression:   1. Acute nonintractable headache, unspecified headache type          (Please note that portions of this note were completed with a voice recognition program.  Efforts were made to edit the dictations but occasionally words are mis-transcribed.)         [1]   Past Medical History:  Diagnosis Date    Abnormal findings on diagnostic imaging of other abdominal regions, including retroperitoneum 10/14/2020    Abnormal CT of the abdomen    Acquired deformity of nose 03/24/2022    Nasal deformity    Acute upper respiratory infection, unspecified 10/16/2019    Acute URI    Adrenal disease (Multi)     Allergic     Allergy status to unspecified drugs, medicaments and biological substances 05/22/2020    History of drug allergy    Allergy status to unspecified drugs, medicaments and biological substances 11/13/2020    History of adverse drug reaction    Anemia     Anxiety 2005    Asthma     Benign intracranial hypertension 01/27/2022    Pseudotumor cerebri    Bipolar disorder, unspecified (Multi)     Bipolar disease, chronic    Breast calcification, right 08/21/2018    Cellulitis of abdominal wall 09/28/2022    Cellulitis of right abdominal wall    Cervicalgia 07/01/2020     Cervicalgia of awxqynga-osvccvd-jhnhz region    Chronic maxillary sinusitis 01/04/2022    Chronic maxillary sinusitis    Chronic sialoadenitis 03/16/2020    Chronic sialoadenitis    COVID-19 01/06/2022    COVID-19 with multiple comorbidities    Decreased white blood cell count, unspecified 11/04/2019    Leukopenia    Disease of thyroid gland     Disturbances of salivary secretion 03/16/2020    Xerostomia    Dry eye syndrome of bilateral lacrimal glands 10/07/2022    Dry eyes, bilateral    Encounter for preprocedural cardiovascular examination 02/01/2022    Preoperative cardiovascular examination    Food additives allergy status 06/11/2020    Allergy to food dye    Fracture of nasal bones, initial encounter for closed fracture 03/03/2022    Closed fracture of nasal bone, initial encounter    GERD (gastroesophageal reflux disease) 15 years old    Granuloma of right orbit 10/07/2021    Inflammatory pseudotumor of right orbit    History of endometrial ablation 11/09/2017    Hyperlipidemia     Hypertension     Hyperthyroidism     Hypoglycemia     Hypothyroidism     Immunocompromised     Localized swelling, mass and lump, head 03/24/2022    Swollen nose    Major depressive disorder, recurrent, in full remission 10/07/2021    Depression, major, recurrent, in complete remission    Mammary duct ectasia of left breast 08/24/2022    Periductal mastitis of left breast    Meningitis [facility] 2008    Migraine     Nipple discharge 08/24/2022    Bloody discharge from left nipple    Ocular pain, right eye 10/07/2022    Pain in right eye    Optic atrophy     Other abnormal and inconclusive findings on diagnostic imaging of breast 07/06/2020    Other abnormal and inconclusive findings on diagnostic imaging of breast    Other anomalies of pupillary function 05/31/2019    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    Other chest pain 05/18/2020    Chest discomfort    Other conditions influencing health status 08/01/2022    History  of cough    Other conditions influencing health status 08/03/2021    Chronic migraine    Other specified disorders of eustachian tube, left ear 11/18/2019    ETD (Eustachian tube dysfunction), left    Other specified disorders of nose and nasal sinuses 03/24/2022    Nasal dryness    Other specified disorders of nose and nasal sinuses 03/24/2022    Nasal crusting    Pelvic and perineal pain 07/06/2020    Pelvic pain    Personal history of other diseases of the circulatory system 04/07/2020    History of sinus tachycardia    Personal history of other diseases of the circulatory system 04/07/2020    History of abnormal electrocardiography    Personal history of other diseases of the circulatory system     History of Raynaud's syndrome    Personal history of other diseases of the digestive system     History of irritable bowel syndrome    Personal history of other diseases of the digestive system 03/02/2020    History of oral pain    Personal history of other diseases of the musculoskeletal system and connective tissue 01/19/2022    History of neck pain    Personal history of other diseases of the musculoskeletal system and connective tissue 03/02/2021    History of scleroderma    Personal history of other diseases of the musculoskeletal system and connective tissue 06/16/2020    History of muscle weakness    Personal history of other diseases of the nervous system and sense organs 11/18/2019    History of hearing loss    Personal history of other diseases of the nervous system and sense organs 09/21/2022    History of partial seizures    Personal history of other diseases of the respiratory system 04/14/2021    History of asthma    Personal history of other endocrine, nutritional and metabolic disease 02/17/2021    History of diabetes mellitus    Personal history of other mental and behavioral disorders 05/27/2021    History of anxiety    Personal history of other specified conditions 09/07/2022    History of nipple  discharge    Personal history of other specified conditions 10/16/2019    History of headache    Personal history of other specified conditions 09/28/2022    History of lump of left breast    Personal history of other specified conditions 09/16/2021    History of persistent cough    Personal history of other specified conditions 02/01/2022    History of palpitations    Personal history of other specified conditions 03/09/2022    History of headache    Personal history of other specified conditions 02/12/2014    History of chest pain    Personal history of other specified conditions 10/27/2021    History of nausea and vomiting    Personal history of other specified conditions 10/16/2019    History of fatigue    Personal history of other specified conditions 02/26/2021    History of orthopnea    Personal history of other specified conditions 02/22/2021    History of shortness of breath    Personal history of urinary calculi     H/O renal calculi    Polycystic ovary syndrome     Postural orthostatic tachycardia syndrome (POTS)     POTS (postural orthostatic tachycardia syndrome)    Rash and other nonspecific skin eruption 03/15/2022    Rash    Repeated falls 06/23/2021    Recurrent falls    Right lower quadrant pain 10/14/2020    Abdominal pain, RLQ (right lower quadrant)    Seizures (Multi)     Sjogren syndrome, unspecified (Multi)     History of Sjogren's disease    Sjogren's syndrome     Sleep apnea     Slow transit constipation 07/09/2020    Slow transit constipation    Subarachnoid hemorrhage, traumatic (Multi) 04/19/2023    Thyroid nodule     Traumatic subarachnoid hemorrhage with loss of consciousness of unspecified duration, subsequent encounter 03/15/2022    Subarachnoid hemorrhage following injury, with loss of consciousness, subsequent encounter    Traumatic subarachnoid hemorrhage without loss of consciousness, subsequent encounter     Subarachnoid hemorrhage following injury, no loss of consciousness,  subsequent encounter    Type 2 diabetes mellitus     Unspecified disorder of refraction 10/07/2022    Refractive error    Unspecified optic neuritis 11/06/2020    Right optic neuritis    Unspecified optic neuritis 11/06/2020    Optic neuritis, right    Unspecified visual loss 09/25/2019    Vision loss    Varicella As a child    Venous insufficiency (chronic) (peripheral) 10/18/2021    Chronic venous insufficiency of lower extremity    Viral infection, unspecified 01/11/2022    Nonspecific syndrome suggestive of viral illness    Vitamin D deficiency     Vitamin D deficiency, unspecified 09/28/2022    Vitamin D deficiency   [2]   Past Surgical History:  Procedure Laterality Date    APPENDECTOMY  2017    BRAIN SURGERY  Saugerties placement to measure pressure    CARDIAC CATHETERIZATION      CHOLECYSTECTOMY      ENDOMETRIAL ABLATION      FRACTURE SURGERY  12/2023    HERNIA REPAIR Right 03/01/2024    with mesh    HYSTERECTOMY  2017    MR HEAD ANGIO WO IV CONTRAST  02/08/2021    MR HEAD ANGIO WO IV CONTRAST 2/8/2021 [facility]    MR NECK ANGIO WO IV CONTRAST  02/08/2021    MR NECK ANGIO WO IV CONTRAST 2/8/2021 [facility]ICAL [facility]    OTHER SURGICAL HISTORY  08/22/2019    Carpal tunnel surgery    OTHER SURGICAL HISTORY  08/22/2019    Hysterectomy    OTHER SURGICAL HISTORY  08/22/2019    Venous access port placement    OTHER SURGICAL HISTORY  08/22/2019    Cholecystectomy    OTHER SURGICAL HISTORY  08/22/2019    Appendectomy    OTHER SURGICAL HISTORY  08/22/2019    Pyloroplasty    WISDOM TOOTH EXTRACTION  2004   [3]   Family History  Problem Relation Name Age of Onset    Other (Perforated bowel) Mother Radha     Hypertension Mother Radha     Macular degeneration Mother Radha     Depression Mother Radha     Hyperlipidemia Mother Radha     Mental illness Mother Radha     Hyperlipidemia Father Mac     Hypertension Father Mac     Heart attack Father Mac     Other (S/P CABG) Father Mac     Diabetes Father  Mac     Prostate cancer Father Mac     Coronary artery disease Father Mac     Heart failure Father Mac     Stroke Father Mac     Cancer Father Mac     Macular degeneration Father Mac     Retinal detachment Father Mac     Asthma Father Mac     Hearing loss Father Mac     Heart disease Father Mac     Hernia Father Mac     Stroke Sister Jazmin     Breast cancer Sister Jazmin     Lupus Sister Jazmin     Ovarian cancer Sister Jazmin     Astigmatism Sister Jazmin     Arthritis Sister Jazmin     Asthma Sister Jazmin     Depression Sister Jazmin     Mental illness Sister Jazmin     Miscarriages / Stillbirths Sister Jazmin     Vision loss Sister Jazmin     Hyperlipidemia Brother Sanchez     Hypertension Brother Sanchez     Astigmatism Brother Sanchez     Arthritis Brother Sanchez     Asthma Brother Sanchez     Diabetes Father's Sister Linda     Blindness Father's Sister Linda     Vision loss Father's Sister Linda     Thyroid cancer Father's Sister Bekah     Thyroid disease Father's Sister Jessica     Colon cancer Father's Brother ?     Cancer Father's Brother Kian     Anesthesia related problems Father's Brother Kian     Depression Father's Brother Kian     Hernia Father's Brother Kian     Mental illness Father's Brother Kian     Cancer Maternal Grandmother Brenda     Ovarian cancer Maternal Grandmother Brenda     Blindness Other Multiple     Melanoma Other      Asthma Other      Other (hay fever) Other      Allergies Other      Alcohol abuse Paternal Grandfather Elkin     Arthritis Sister Isha     Asthma Sister Isha     Cancer Sister Isha     Depression Sister Isha     Mental illness Sister Isha     Miscarriages / Stillbirths Sister Isha     Ovarian cancer Sister Isha     Glaucoma Neg Hx     [4]   Social History  Socioeconomic History    Marital status:    Tobacco Use    Smoking status: Never    Smokeless tobacco: Never   Vaping Use    Vaping status: Never Used   Substance and Sexual Activity    Alcohol use: Not Currently      Comment: Socially in College    Drug use: Yes     Types: Benzodiazepines    Sexual activity: Not Currently     Partners: Male     Birth control/protection: Abstinence, Surgical     Comment: Partial Hysterectomy     Social Drivers of Health     Financial Resource Strain: Medium Risk (2/17/2025)    Overall Financial Resource Strain (CARDIA)     Difficulty of Paying Living Expenses: Somewhat hard   Food Insecurity: Food Insecurity Present (2/17/2025)    Hunger Vital Sign     Worried About Running Out of Food in the Last Year: Often true     Ran Out of Food in the Last Year: Sometimes true   Transportation Needs: High Risk (3/21/2025)    Received from [facility] SDOH Screening     In the past year, have you had trouble getting to medical appointments or getting things you need because of transportation?: Yes   Physical Activity: Inactive (7/28/2021)    Received from [facility]    Exercise Vital Sign     Days of Exercise per Week: 0 days     Minutes of Exercise per Session: 0 min   Stress: Stress Concern Present (7/28/2021)    Received from [facility]    [employer] [place] [employer] - Occupational Stress Questionnaire     Feeling of Stress : To some extent   Social Connections: Moderately Integrated (7/28/2021)    Received from [facility]    Social Connection and Isolation Panel [NHANES]     Frequency of Communication with Friends and Family: Three times a week     Frequency of Social Gatherings with Friends and Family: Twice a week     Attends Yarsani Services: 1 to 4 times per year     Active Member of Clubs or Organizations: No     Attends Club or Organization Meetings: Never     Marital Status:    Intimate Partner Violence: Not At Risk (2/17/2025)    Humiliation, Afraid, Rape, and Kick questionnaire     Fear of Current or Ex-Partner: No     Emotionally Abused: No     Physically Abused: No     Sexually Abused: No    Housing Stability: High Risk (3/21/2025)    Received from [facility] SDOH Screening     Does your current living situation have any of the following problems?: Smoke detectors missing or not working     Does your current living situation have any of the following problems?: Water leaks     Describe your current living situation.: I have a place to live today, but i am worried about losing it in the future        Robert Yu MD  Resident  04/21/25 [id number]

## 2025-04-22 ENCOUNTER — APPOINTMENT (OUTPATIENT)
Dept: RADIOLOGY | Facility: HOSPITAL | Age: 50
End: 2025-04-22
Payer: MEDICAID

## 2025-04-22 VITALS
HEART RATE: 95 BPM | WEIGHT: 250 LBS | TEMPERATURE: 97.6 F | SYSTOLIC BLOOD PRESSURE: 132 MMHG | HEIGHT: 62 IN | RESPIRATION RATE: 18 BRPM | DIASTOLIC BLOOD PRESSURE: 84 MMHG | BODY MASS INDEX: 46.01 KG/M2 | OXYGEN SATURATION: 94 %

## 2025-04-22 PROCEDURE — 70250 X-RAY EXAM OF SKULL: CPT

## 2025-04-22 PROCEDURE — 70250 X-RAY EXAM OF SKULL: CPT | Performed by: RADIOLOGY

## 2025-04-22 ASSESSMENT — PAIN DESCRIPTION - PAIN TYPE: TYPE: ACUTE PAIN

## 2025-04-22 ASSESSMENT — PAIN SCALES - GENERAL: PAINLEVEL_OUTOF10: 0 - NO PAIN

## 2025-04-22 ASSESSMENT — PAIN - FUNCTIONAL ASSESSMENT: PAIN_FUNCTIONAL_ASSESSMENT: 0-10

## 2025-04-22 NOTE — DISCHARGE INSTRUCTIONS
Please continue to take your medications as prescribed.  Follow-up with neurosurgery in one week as they requested.  If you develop an uncontrollable headache, or other worrisome symptoms, return to the ER.     --   Neurosurgery Clinic  [facility]  Phone: [phone] or [phone]

## 2025-04-22 NOTE — CONSULTS
NEUROSURGERY CONSULT NOTE        Date of Service:  4/21/2025 Attending Provider:  Victoriano Carpenter MD     Reason for Consultation:  Jonathan Ayala is being seen today for a consult requested by Victoriano Carpenter MD for HA with shunt.      Subjective   History of Present Illness:  Jonathan is a 49 y.o. 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 c/b tract hemorrhage and focal epilepsy, right hemiplegic migraines, CVID on monthly IVIG infusions, Sjogren's syndrome/scleroderma, POTS, T2DM, HTN, hypothyroidism, BRENT on CPAP, anxiety/depression, left non-arteritic anterior ischemic optic neuropathy with b/l sequential vision loss 10/30/2024 s/p R VPS exploration w abdominal catheter repositioning w improvement in distal runnoff, 11/15/2024 s/p RF shunt wound revision and fractured valve replacement, 12/10 s/p R cranial wound exploration, washout, revision, 1/28 p/w drainage from shunt site, vision changes, 1/28 s/p LP (OP31), 1/30 s/p valve exchange (certas at 4),  2/26 shunt dialed to 2, 3/4 feeling worse, shunt dialed to certas 3, p/w HS to region of shunt, w subsequent pressure, CTH stable vents, SS intact shunt, certas dialed to 2, 4/21 p/w HA, CTH min decr vents, small b/l hygromas, SS intact    Patient states that last Wednesday 4/16 she had a seizure due to urinary/bowel incontinence during the night as reported by her . Subsequently she started to have increased headaches, pressure behind her b/l eyes and ears (R>L) and dizziness that has slowly worsened since Thursday 4/17. Otherwise she denies any acute vision changes, numbness, weakness, fever or chills.     Certas shunt was manually checked in ED and confirmed at 2. She also notes increased nose bleeds over the weekend.       Review of Systems   Psychiatric/Behavioral:  Negative for behavioral problems.       10 point ROS is obtained and negative except the ones mentioned in the HPI    Social History  She reports that she has never smoked.  She has never used smokeless tobacco. She reports that she does not currently use alcohol. She reports current drug use. Drug: Benzodiazepines.    Medical History  Medical History[1]    Surgical History  Surgical History[2]     Objective     Vitals:  Vitals:    04/21/25 1717   BP: 138/85   Pulse: 85   Resp: 16   Temp: 36 °C (96.8 °F)   SpO2: 97%         Exam:  Constitutional: No acute distress  Resp: breathing comfortably  Cardio: well perfused  GI: nondistended  MSK: full range of motion  Neuro: Awake, A&Ox3  EOMI, VFF, OU3R  Face symmetric   Motor:   BUE 5/5  BLE 5/5   No pronator drift  Sensation: SILT throughout all extremities  Psych: appropriate  Skin: shunt site clean, dry and intact, no fluid discharge       Medications  Current Outpatient Medications   Medication Instructions    acetaminophen (TYLENOL) 325-650 mg, Every 6 hours PRN    Advair -21 mcg/actuation inhaler INHALE TWO PUFFS BY MOUTH AS INSTRUCTED TWO TIMES A DAY.    amitriptyline (ELAVIL) 100 mg, oral, Nightly    azelastine (Astelin) 137 mcg (0.1 %) nasal spray 1 spray, 2 times daily    blood-glucose sensor (DEXCOM G7 SENSOR MISC) Use to check blood sugar continuously throughout the day as directed. Change sensor every 10 days.    calcium-vitamin D3-vitamin K (Viactiv) 650 mg-12.5 mcg-40 mcg chewable tablet 2 tablets, Daily    carboxymethylcellulose (Refresh Celluvisc) 1 % ophthalmic solution dropperette 2 drops, 3 times daily PRN    cholecalciferol (Vitamin D-3) 5,000 Units tablet 1 tablet, Daily    clonazePAM (KlonoPIN) 0.5 mg tablet 1 tablet, 2 times daily    Dexcom G7  misc Use as instructed to check blood sugars continuously throughout the day    divalproex (Depakote ER) 500 mg 24 hr tablet 1 tablet, 2 times daily    EPINEPHrine 0.3 mg/0.3 mL injection syringe INJECT INTRAMUSCULARLY ONCE AS NEEDED FOR ANAPHYLAXIS    ezetimibe (Zetia) 10 mg tablet Take 1 tablet once daily    ezetimibe (ZETIA) 10 mg, oral, Daily    fluconazole  (Diflucan) 150 mg tablet 1 tablet by mouth daily spread 72 hours apart    fluticasone (Flonase) 50 mcg/actuation nasal spray INSTILL ONE SPRAY INTO EACH NOSTRIL ONCE DAILY    folic acid (FOLVITE) 1 mg, oral, Daily    furosemide (LASIX) 10 mg, oral, 2 times daily    gloves, latex with aloe vera misc Large size gloves    glucagon (Baqsimi) 3 mg/actuation spray,non-aerosol USE ONE SPRAY IN THE NOSE AS NEEDED FOR LOW BLOOD SUGAR  MAY REPEAT AFTER 15 MINUTES USING A NEW DEVICE IF NO RESPONSE    glucagon (GLUCAGEN) 1 mg, subcutaneous, Once as needed    hydrocortisone (CORTEF) 10 mg, oral, 2 times daily, Double the dose if sick for three days    immune globulin, human, (Gammagard) infusion Infuse 600 mL (60 g) into a venous catheter every 14 (fourteen) days.    insulin glargine (Toujeo Max Solostar- 2 unit dial) 300 unit/mL (3 mL) pen Inject 54 units under the skin every morning    insulin lispro (HumaLOG KwikPen Insulin) 100 unit/mL injection Use as directed to give up to 100 units a day    ipratropium-albuteroL (Duo-Neb) 0.5-2.5 mg/3 mL nebulizer solution 3 mL, nebulization, Every 6 hours PRN    lacosamide (VIMPAT) 300 mg, oral, 2 times daily    lasmiditan (Reyvow) 100 mg tablet Take 1 tablet (100 mg) by mouth if needed for migraine. Do not drive in 8 hours of taking this medicine. Maximum 1 dose per 24 hours.    levETIRAcetam (KEPPRA) 1,500 mg, oral, 2 times daily    levothyroxine (Synthroid, Levoxyl) 50 mcg tablet TAKE 1 & 1/2 TABLETS (75 MCG) BY MOUTH ONCE DAILY IN THE MORNING. TAKE BEFORE MEALS.    losartan (COZAAR) 25 mg, oral, Daily    methylPREDNISolone (Medrol) 4 mg tablet 8 tabs daily 5 days , 7 tabs daily 5 days , 6 tabs daily 5 days , 5 tabs daily 5 days , 4 tabs daily 5 days , 3 tablets daily 5 days 2 tabs daily 5 days , 1 tab daily 5 days per [facility].    miconazole (Micotin) 2 % cream 1 Application, Daily PRN    miconazole (Micotin) 2 % powder Apply to groin , under the breast and  "skin folds daily    moisturizing mouth (Biotene Oral Dry Mouth) solution 1 spray, 4 times daily PRN    montelukast (SINGULAIR) 10 mg, oral, Nightly    Motegrity 2 mg tablet 1 tablet, Daily    nasal spray Nayzilam 5 mg/spray (0.1 mL) spray,non-aerosol Once    ondansetron ODT (ZOFRAN-ODT) 8 mg, oral, Every 8 hours PRN    OneTouch Delica Plus Lancet 33 gauge misc USE 1 LANCET TO CHECK GLUCOSE ONCE DAILY AS DIRECTED    OneTouch Verio test strips strip USE 1 STRIP TO CHECK GLUCOSE ONCE DAILY AS DIRECTED    pantoprazole (PROTONIX) 40 mg, 2 times daily before meals    pen needle, diabetic (BD Lela 2nd Gen Pen Needle) 32 gauge x 5/32\" needle Use as directed with insulin pen up to 5 times daily    polyethylene glycol (GLYCOLAX, MIRALAX) 17 g, 3 times daily PRN    propranolol LA (INDERAL LA) 60 mg, oral, Daily, Do not crush, chew, or split.    rOPINIRole (Requip) 0.5 mg tablet Take 1 tablet by mouth (0.5mg) in the morning and 2 tablets (1mg) by mouth in the evening    senna 8.6 mg tablet 1 tablet, Nightly    tiZANidine (ZANAFLEX) 2 mg, 2 times daily    ubrogepant (UBRELVY) 100 mg, oral, As needed, May repeat in 2 hours for max of 200mg per 24 hours.    ZINC ORAL 50 mg, Daily        Diagnostic Results:    Lab Results   Component Value Date    WBC 6.6 04/21/2025    HGB 10.9 (L) 04/21/2025    HCT 34.4 (L) 04/21/2025    MCV 87 04/21/2025     04/21/2025     Lab Results   Component Value Date    CREATININE 0.95 04/21/2025    BUN 22 04/21/2025     04/21/2025    K 3.9 04/21/2025     04/21/2025    CO2 26 04/21/2025     Lab Results   Component Value Date    INR 1.0 04/21/2025    INR 1.0 02/15/2025    INR 1.0 01/28/2025    PROTIME 10.7 04/21/2025    PROTIME 11.3 02/15/2025    PROTIME 11.4 01/28/2025       Imaging Results:    CT head wo IV contrast   Final Result   1. Negative head CT for acute change.   2. Very small, bilateral hygromas are suspected. These measure on the   order of at most 3 mm.        MACRO:   None "        Signed by: Erwin Barrett 4/21/2025 6:38 PM   Dictation workstation:   [id number]      XR shunt series   Final Result   1.  Right frontal ventriculostomy shunt catheter, as described above.   2.  Mild central opacities, may represent pulmonary edema versus   compressive changes.   3.  Nonobstructive bowel gas pattern.        Signed by: Boo Davis 4/21/2025 6:16 PM   Dictation workstation:   [id number]                Assessment/Plan   Assessment:  Jonathan is a 49 y.o. 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 c/b tract hemorrhage and focal epilepsy, right hemiplegic migraines, CVID on monthly IVIG infusions, Sjogren's syndrome/scleroderma, POTS, T2DM, HTN, hypothyroidism, BRENT on CPAP, anxiety/depression, left non-arteritic anterior ischemic optic neuropathy with b/l sequential vision loss 10/30/2024 s/p R VPS exploration w abdominal catheter repositioning w improvement in distal runnoff, 11/15/2024 s/p RF shunt wound revision and fractured valve replacement, 12/10 s/p R cranial wound exploration, washout, revision, 1/28 p/w drainage from shunt site, vision changes, 1/28 s/p LP (OP31), 1/30 s/p valve exchange (certas at 4),  2/26 shunt dialed to 2, 3/4 feeling worse, shunt dialed to certas 3, p/w HS to region of shunt, w subsequent pressure, CTH stable vents, SS intact shunt, certas dialed to 2, 4/21 p/w HA, CTH min decr vents, small b/l hygromas, SS intact    Recently saw optho on 4/4 with no papilledema. Will plan to dial shunt to 3 with short term outpatient follow up.     Plan:  Certas shunt dialed to 3  Obtain skull XR for confirmation of shunt setting  Will arrange 1 week outpatient follow up with Dr. Lottie Hicks MD  Department of Neurosurgery   [facility]   Note authored by resident on neurosurgery team, with all questions or to contact team please page at [phone]  Plan not finalized until note signed by attending         [1]   Past Medical  History:  Diagnosis Date    Abnormal findings on diagnostic imaging of other abdominal regions, including retroperitoneum 10/14/2020    Abnormal CT of the abdomen    Acquired deformity of nose 03/24/2022    Nasal deformity    Acute upper respiratory infection, unspecified 10/16/2019    Acute URI    Adrenal disease (Multi)     Allergic     Allergy status to unspecified drugs, medicaments and biological substances 05/22/2020    History of drug allergy    Allergy status to unspecified drugs, medicaments and biological substances 11/13/2020    History of adverse drug reaction    Anemia     Anxiety 2005    Asthma     Benign intracranial hypertension 01/27/2022    Pseudotumor cerebri    Bipolar disorder, unspecified (Multi)     Bipolar disease, chronic    Breast calcification, right 08/21/2018    Cellulitis of abdominal wall 09/28/2022    Cellulitis of right abdominal wall    Cervicalgia 07/01/2020    Cervicalgia of [id number] region    Chronic maxillary sinusitis 01/04/2022    Chronic maxillary sinusitis    Chronic sialoadenitis 03/16/2020    Chronic sialoadenitis    COVID-19 01/06/2022    COVID-19 with multiple comorbidities    Decreased white blood cell count, unspecified 11/04/2019    Leukopenia    Disease of thyroid gland     Disturbances of salivary secretion 03/16/2020    Xerostomia    Dry eye syndrome of bilateral lacrimal glands 10/07/2022    Dry eyes, bilateral    Encounter for preprocedural cardiovascular examination 02/01/2022    Preoperative cardiovascular examination    Food additives allergy status 06/11/2020    Allergy to food dye    Fracture of nasal bones, initial encounter for closed fracture 03/03/2022    Closed fracture of nasal bone, initial encounter    GERD (gastroesophageal reflux disease) 15 years old    Granuloma of right orbit 10/07/2021    Inflammatory pseudotumor of right orbit    History of endometrial ablation 11/09/2017    Hyperlipidemia     Hypertension     Hyperthyroidism      Hypoglycemia     Hypothyroidism     Immunocompromised     Localized swelling, mass and lump, head 03/24/2022    Swollen nose    Major depressive disorder, recurrent, in full remission 10/07/2021    Depression, major, recurrent, in complete remission    Mammary duct ectasia of left breast 08/24/2022    Periductal mastitis of left breast    Meningitis ([facility] 2008    Migraine     Nipple discharge 08/24/2022    Bloody discharge from left nipple    Ocular pain, right eye 10/07/2022    Pain in right eye    Optic atrophy     Other abnormal and inconclusive findings on diagnostic imaging of breast 07/06/2020    Other abnormal and inconclusive findings on diagnostic imaging of breast    Other anomalies of pupillary function 05/31/2019    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    Other chest pain 05/18/2020    Chest discomfort    Other conditions influencing health status 08/01/2022    History of cough    Other conditions influencing health status 08/03/2021    Chronic migraine    Other specified disorders of eustachian tube, left ear 11/18/2019    ETD (Eustachian tube dysfunction), left    Other specified disorders of nose and nasal sinuses 03/24/2022    Nasal dryness    Other specified disorders of nose and nasal sinuses 03/24/2022    Nasal crusting    Pelvic and perineal pain 07/06/2020    Pelvic pain    Personal history of other diseases of the circulatory system 04/07/2020    History of sinus tachycardia    Personal history of other diseases of the circulatory system 04/07/2020    History of abnormal electrocardiography    Personal history of other diseases of the circulatory system     History of Raynaud's syndrome    Personal history of other diseases of the digestive system     History of irritable bowel syndrome    Personal history of other diseases of the digestive system 03/02/2020    History of oral pain    Personal history of other diseases of the musculoskeletal system and connective tissue 01/19/2022     History of neck pain    Personal history of other diseases of the musculoskeletal system and connective tissue 03/02/2021    History of scleroderma    Personal history of other diseases of the musculoskeletal system and connective tissue 06/16/2020    History of muscle weakness    Personal history of other diseases of the nervous system and sense organs 11/18/2019    History of hearing loss    Personal history of other diseases of the nervous system and sense organs 09/21/2022    History of partial seizures    Personal history of other diseases of the respiratory system 04/14/2021    History of asthma    Personal history of other endocrine, nutritional and metabolic disease 02/17/2021    History of diabetes mellitus    Personal history of other mental and behavioral disorders 05/27/2021    History of anxiety    Personal history of other specified conditions 09/07/2022    History of nipple discharge    Personal history of other specified conditions 10/16/2019    History of headache    Personal history of other specified conditions 09/28/2022    History of lump of left breast    Personal history of other specified conditions 09/16/2021    History of persistent cough    Personal history of other specified conditions 02/01/2022    History of palpitations    Personal history of other specified conditions 03/09/2022    History of headache    Personal history of other specified conditions 02/12/2014    History of chest pain    Personal history of other specified conditions 10/27/2021    History of nausea and vomiting    Personal history of other specified conditions 10/16/2019    History of fatigue    Personal history of other specified conditions 02/26/2021    History of orthopnea    Personal history of other specified conditions 02/22/2021    History of shortness of breath    Personal history of urinary calculi     H/O renal calculi    Polycystic ovary syndrome     Postural orthostatic tachycardia syndrome (POTS)      POTS (postural orthostatic tachycardia syndrome)    Rash and other nonspecific skin eruption 03/15/2022    Rash    Repeated falls 06/23/2021    Recurrent falls    Right lower quadrant pain 10/14/2020    Abdominal pain, RLQ (right lower quadrant)    Seizures (Multi)     Sjogren syndrome, unspecified (Multi)     History of Sjogren's disease    Sjogren's syndrome     Sleep apnea     Slow transit constipation 07/09/2020    Slow transit constipation    Subarachnoid hemorrhage, traumatic (Multi) 04/19/2023    Thyroid nodule     Traumatic subarachnoid hemorrhage with loss of consciousness of unspecified duration, subsequent encounter 03/15/2022    Subarachnoid hemorrhage following injury, with loss of consciousness, subsequent encounter    Traumatic subarachnoid hemorrhage without loss of consciousness, subsequent encounter     Subarachnoid hemorrhage following injury, no loss of consciousness, subsequent encounter    Type 2 diabetes mellitus     Unspecified disorder of refraction 10/07/2022    Refractive error    Unspecified optic neuritis 11/06/2020    Right optic neuritis    Unspecified optic neuritis 11/06/2020    Optic neuritis, right    Unspecified visual loss 09/25/2019    Vision loss    Varicella As a child    Venous insufficiency (chronic) (peripheral) 10/18/2021    Chronic venous insufficiency of lower extremity    Viral infection, unspecified 01/11/2022    Nonspecific syndrome suggestive of viral illness    Vitamin D deficiency     Vitamin D deficiency, unspecified 09/28/2022    Vitamin D deficiency   [2]   Past Surgical History:  Procedure Laterality Date    APPENDECTOMY  2017    BRAIN SURGERY  [place] placement to measure pressure    CARDIAC CATHETERIZATION      CHOLECYSTECTOMY      ENDOMETRIAL ABLATION      FRACTURE SURGERY  12/2023    HERNIA REPAIR Right 03/01/2024    with mesh    HYSTERECTOMY  2017    MR HEAD ANGIO WO IV CONTRAST  02/08/2021    MR HEAD ANGIO WO IV CONTRAST 2/8/2021 [facility] CLINICAL LEGACY      NECK ANGIO WO IV CONTRAST  02/08/2021    MR NECK ANGIO WO IV CONTRAST 2/8/2021 [facility] CLINICAL LEGACY    OTHER SURGICAL HISTORY  08/22/2019    Carpal tunnel surgery    OTHER SURGICAL HISTORY  08/22/2019    Hysterectomy    OTHER SURGICAL HISTORY  08/22/2019    Venous access port placement    OTHER SURGICAL HISTORY  08/22/2019    Cholecystectomy    OTHER SURGICAL HISTORY  08/22/2019    Appendectomy    OTHER SURGICAL HISTORY  08/22/2019    Pyloroplasty    WISDOM TOOTH EXTRACTION  2004

## 2025-04-22 NOTE — PROGRESS NOTES
Emergency Department Transition of Care Note       Signout   I received Jonathan Ayala in signout from Dr. Yu.  Please see the ED Provider Note for all HPI, PE and MDM up to the time of signout at 2300.  This is in addition to the primary record.    In brief Jonathan Ayala is an 49 y.o. female presenting for evaluation of worsening headache over the past several day. Patient had a CT head done which showed hygromas. Shunt series within normal. Neurosurgery was consulted, will evaluate the patient at bedside for further recommendations.     At the time of signout we were awaiting:  Pending nsgy recommendations   Final disposition    ED Course & Medical Decision Making   Medical Decision Making:  Under my care, Ms. Ayala was pending neurosurgery evaluation and recommendation.  They did evaluate the patient at bedside and did not believe that they were going to provide any acute interventions, however they stated that they do need to discuss this with their attending.  They also note that the patient's settings for her shunt was recently changed on her recent visit, they are considering potentially increasing the settings.  I spoke with them just after midnight, and they still not spoke with their attending.  I then spoke with them at approximately 2 AM, and they stated that they would call me back shortly with further recommendations.    Neurosurgery provided final recommendations which include dialing up the patient's existing shunt, obtaining new skull XR, and following up outpatient with neurosurgery team in one week. Patient was updated on this plan and was in agreement. Patient instructed to return to the emergency department should her symptoms worsen, and was provided with contact information for neurosurgery clinic to arrange follow-up. Patient discharged home in stable condition.       ED Course:  Diagnoses as of 04/21/25 2331   Acute nonintractable headache, unspecified headache type       Disposition   As a  result of the work-up, the patient was discharged home.  she was informed of her diagnosis and instructed to come back with any concerns or worsening of condition.  she and was agreeable to the plan as discussed above.  she was given the opportunity to ask questions.  All of the patient's questions were answered.    Procedures   Procedures    Patient seen and discussed with ED attending physician.    Barbie Bliss, DO  Emergency Medicine

## 2025-04-23 DIAGNOSIS — J45.40 MODERATE PERSISTENT ALLERGIC ASTHMA (HHS-HCC): ICD-10-CM

## 2025-04-24 ENCOUNTER — SPECIALTY PHARMACY (OUTPATIENT)
Dept: PHARMACY | Facility: CLINIC | Age: 50
End: 2025-04-24

## 2025-04-24 RX ORDER — MONTELUKAST SODIUM 10 MG/1
10 TABLET ORAL NIGHTLY
Qty: 90 TABLET | Refills: 0 | Status: SHIPPED | OUTPATIENT
Start: 2025-04-24

## 2025-04-28 PROCEDURE — 99284 EMERGENCY DEPT VISIT MOD MDM: CPT | Performed by: EMERGENCY MEDICINE

## 2025-04-28 ASSESSMENT — PAIN DESCRIPTION - LOCATION: LOCATION: GROIN

## 2025-04-28 ASSESSMENT — PAIN SCALES - GENERAL: PAINLEVEL_OUTOF10: 7

## 2025-04-28 ASSESSMENT — PAIN DESCRIPTION - PAIN TYPE: TYPE: ACUTE PAIN

## 2025-04-28 ASSESSMENT — PAIN - FUNCTIONAL ASSESSMENT: PAIN_FUNCTIONAL_ASSESSMENT: 0-10

## 2025-04-29 ENCOUNTER — PATIENT OUTREACH (OUTPATIENT)
Dept: PRIMARY CARE | Facility: CLINIC | Age: 50
End: 2025-04-29
Payer: MEDICAID

## 2025-04-29 ENCOUNTER — APPOINTMENT (OUTPATIENT)
Dept: RADIOLOGY | Facility: HOSPITAL | Age: 50
End: 2025-04-29
Payer: MEDICAID

## 2025-04-29 ENCOUNTER — HOSPITAL ENCOUNTER (EMERGENCY)
Facility: HOSPITAL | Age: 50
Discharge: HOME | End: 2025-04-29
Attending: EMERGENCY MEDICINE
Payer: MEDICAID

## 2025-04-29 VITALS
DIASTOLIC BLOOD PRESSURE: 96 MMHG | HEIGHT: 62 IN | RESPIRATION RATE: 16 BRPM | OXYGEN SATURATION: 98 % | BODY MASS INDEX: 48.76 KG/M2 | WEIGHT: 265 LBS | HEART RATE: 80 BPM | SYSTOLIC BLOOD PRESSURE: 141 MMHG | TEMPERATURE: 97.8 F

## 2025-04-29 DIAGNOSIS — J30.2 PERENNIAL ALLERGIC RHINITIS WITH SEASONAL VARIATION: ICD-10-CM

## 2025-04-29 DIAGNOSIS — Z79.4 TYPE 2 DIABETES MELLITUS WITH DIABETIC AUTONOMIC NEUROPATHY, WITH LONG-TERM CURRENT USE OF INSULIN: ICD-10-CM

## 2025-04-29 DIAGNOSIS — R10.32 LEFT LOWER QUADRANT ABDOMINAL PAIN: Primary | ICD-10-CM

## 2025-04-29 DIAGNOSIS — J30.89 PERENNIAL ALLERGIC RHINITIS WITH SEASONAL VARIATION: ICD-10-CM

## 2025-04-29 DIAGNOSIS — F33.42 MAJOR DEPRESSIVE DISORDER, RECURRENT, IN FULL REMISSION WITH ANXIOUS DISTRESS: Primary | ICD-10-CM

## 2025-04-29 DIAGNOSIS — E11.43 TYPE 2 DIABETES MELLITUS WITH DIABETIC AUTONOMIC NEUROPATHY, WITH LONG-TERM CURRENT USE OF INSULIN: ICD-10-CM

## 2025-04-29 DIAGNOSIS — I10 BENIGN ESSENTIAL HYPERTENSION: ICD-10-CM

## 2025-04-29 LAB
ALBUMIN SERPL BCP-MCNC: 3.6 G/DL (ref 3.4–5)
ALP SERPL-CCNC: 76 U/L (ref 33–110)
ALT SERPL W P-5'-P-CCNC: 12 U/L (ref 7–45)
ANION GAP SERPL CALC-SCNC: 12 MMOL/L (ref 10–20)
AST SERPL W P-5'-P-CCNC: 12 U/L (ref 9–39)
BASOPHILS # BLD AUTO: 0.03 X10*3/UL (ref 0–0.1)
BASOPHILS NFR BLD AUTO: 0.6 %
BILIRUB SERPL-MCNC: 0.3 MG/DL (ref 0–1.2)
BUN SERPL-MCNC: 20 MG/DL (ref 6–23)
CALCIUM SERPL-MCNC: 8.6 MG/DL (ref 8.6–10.3)
CHLORIDE SERPL-SCNC: 103 MMOL/L (ref 98–107)
CO2 SERPL-SCNC: 26 MMOL/L (ref 21–32)
CREAT SERPL-MCNC: 0.84 MG/DL (ref 0.5–1.05)
EGFRCR SERPLBLD CKD-EPI 2021: 86 ML/MIN/1.73M*2
EOSINOPHIL # BLD AUTO: 0.04 X10*3/UL (ref 0–0.7)
EOSINOPHIL NFR BLD AUTO: 0.8 %
ERYTHROCYTE [DISTWIDTH] IN BLOOD BY AUTOMATED COUNT: 17.1 % (ref 11.5–14.5)
GLUCOSE SERPL-MCNC: 159 MG/DL (ref 74–99)
HCT VFR BLD AUTO: 37.1 % (ref 36–46)
HGB BLD-MCNC: 11.6 G/DL (ref 12–16)
IMM GRANULOCYTES # BLD AUTO: 0.01 X10*3/UL (ref 0–0.7)
IMM GRANULOCYTES NFR BLD AUTO: 0.2 % (ref 0–0.9)
LIPASE SERPL-CCNC: 51 U/L (ref 9–82)
LYMPHOCYTES # BLD AUTO: 1.64 X10*3/UL (ref 1.2–4.8)
LYMPHOCYTES NFR BLD AUTO: 32.7 %
MCH RBC QN AUTO: 27.4 PG (ref 26–34)
MCHC RBC AUTO-ENTMCNC: 31.3 G/DL (ref 32–36)
MCV RBC AUTO: 88 FL (ref 80–100)
MONOCYTES # BLD AUTO: 0.44 X10*3/UL (ref 0.1–1)
MONOCYTES NFR BLD AUTO: 8.8 %
NEUTROPHILS # BLD AUTO: 2.86 X10*3/UL (ref 1.2–7.7)
NEUTROPHILS NFR BLD AUTO: 56.9 %
NRBC BLD-RTO: 0 /100 WBCS (ref 0–0)
PLATELET # BLD AUTO: 251 X10*3/UL (ref 150–450)
POTASSIUM SERPL-SCNC: 3.7 MMOL/L (ref 3.5–5.3)
PROT SERPL-MCNC: 7.4 G/DL (ref 6.4–8.2)
RBC # BLD AUTO: 4.23 X10*6/UL (ref 4–5.2)
SODIUM SERPL-SCNC: 137 MMOL/L (ref 136–145)
WBC # BLD AUTO: 5 X10*3/UL (ref 4.4–11.3)

## 2025-04-29 PROCEDURE — 85025 COMPLETE CBC W/AUTO DIFF WBC: CPT | Performed by: EMERGENCY MEDICINE

## 2025-04-29 PROCEDURE — 96374 THER/PROPH/DIAG INJ IV PUSH: CPT

## 2025-04-29 PROCEDURE — 80053 COMPREHEN METABOLIC PANEL: CPT | Performed by: EMERGENCY MEDICINE

## 2025-04-29 PROCEDURE — 83690 ASSAY OF LIPASE: CPT | Performed by: EMERGENCY MEDICINE

## 2025-04-29 PROCEDURE — 74176 CT ABD & PELVIS W/O CONTRAST: CPT | Performed by: STUDENT IN AN ORGANIZED HEALTH CARE EDUCATION/TRAINING PROGRAM

## 2025-04-29 PROCEDURE — 74176 CT ABD & PELVIS W/O CONTRAST: CPT

## 2025-04-29 PROCEDURE — 2500000004 HC RX 250 GENERAL PHARMACY W/ HCPCS (ALT 636 FOR OP/ED): Mod: JZ | Performed by: EMERGENCY MEDICINE

## 2025-04-29 RX ORDER — HEPARIN 100 UNIT/ML
5 SYRINGE INTRAVENOUS ONCE
Status: COMPLETED | OUTPATIENT
Start: 2025-04-29 | End: 2025-04-29

## 2025-04-29 RX ORDER — DROPERIDOL 2.5 MG/ML
0.62 INJECTION, SOLUTION INTRAMUSCULAR; INTRAVENOUS ONCE
Status: COMPLETED | OUTPATIENT
Start: 2025-04-29 | End: 2025-04-29

## 2025-04-29 RX ADMIN — Medication 500 UNITS: at 03:14

## 2025-04-29 RX ADMIN — DROPERIDOL 0.62 MG: 2.5 INJECTION, SOLUTION INTRAMUSCULAR; INTRAVENOUS at 01:53

## 2025-04-29 ASSESSMENT — LIFESTYLE VARIABLES
HAVE YOU EVER FELT YOU SHOULD CUT DOWN ON YOUR DRINKING: NO
EVER FELT BAD OR GUILTY ABOUT YOUR DRINKING: NO
EVER HAD A DRINK FIRST THING IN THE MORNING TO STEADY YOUR NERVES TO GET RID OF A HANGOVER: NO
TOTAL SCORE: 0
HAVE PEOPLE ANNOYED YOU BY CRITICIZING YOUR DRINKING: NO

## 2025-04-29 NOTE — ED PROVIDER NOTES
HPI   No chief complaint on file.      Chief complaint: Left-sided groin pain    History of present illness: Patient is a 49-year-old female with history of seizure disorder CVID depression hypertension diabetes hyperlipidemia estrogens syndrome presenting to the emergency department with complaints of left-sided groin pain.  According to the patient, she has been experiencing this over the past day and a half.  The patient states the pain is coming and going.  The patient states that this feels similar to when she had a hernia on the right side this is status post repair approximately 2 years ago.  Patient denies any nausea vomiting or fever with this.  Concern The patient presents to the emergency department for further evaluation.      History provided by:  Patient   used: No            Patient History   Medical History[1]  Surgical History[2]  Family History[3]  Social History[4]    Physical Exam   ED Triage Vitals [04/28/25 2357]   Temperature Heart Rate Respirations BP   36.6 °C (97.8 °F) 98 18 (!) 173/97      Pulse Ox Temp src Heart Rate Source Patient Position   98 % -- -- --      BP Location FiO2 (%)     -- --       Physical Exam  Constitutional:       Appearance: Normal appearance.   HENT:      Head: Normocephalic and atraumatic.      Right Ear: External ear normal.      Left Ear: External ear normal.      Nose: Nose normal.      Mouth/Throat:      Mouth: Mucous membranes are moist.   Eyes:      General: Lids are normal.      Extraocular Movements: Extraocular movements intact.      Pupils: Pupils are equal, round, and reactive to light.   Cardiovascular:      Rate and Rhythm: Normal rate and regular rhythm.      Heart sounds: Normal heart sounds.   Pulmonary:      Effort: Pulmonary effort is normal.      Breath sounds: Normal breath sounds.   Abdominal:      General: Abdomen is flat.      Palpations: Abdomen is soft.      Tenderness: There is abdominal tenderness in the left lower  quadrant. There is no guarding or rebound.      Hernia: No hernia is present.   Musculoskeletal:         General: No deformity. Normal range of motion.      Cervical back: Normal range of motion and neck supple.   Skin:     General: Skin is warm.      Capillary Refill: Capillary refill takes less than 2 seconds.      Coloration: Skin is not jaundiced.   Neurological:      General: No focal deficit present.      Mental Status: She is alert and oriented to person, place, and time.   Psychiatric:         Mood and Affect: Mood normal.         Behavior: Behavior normal.           ED Course & MDM                  No data recorded     Jay Coma Scale Score: 15 (04/28/25 2358 : Reji Garcia RN)                           Medical Decision Making      Procedure  Procedures         [1]   Past Medical History:  Diagnosis Date    Abnormal findings on diagnostic imaging of other abdominal regions, including retroperitoneum 10/14/2020    Abnormal CT of the abdomen    Acquired deformity of nose 03/24/2022    Nasal deformity    Acute upper respiratory infection, unspecified 10/16/2019    Acute URI    Adrenal disease (Multi)     Allergic     Allergy status to unspecified drugs, medicaments and biological substances 05/22/2020    History of drug allergy    Allergy status to unspecified drugs, medicaments and biological substances 11/13/2020    History of adverse drug reaction    Anemia     Anxiety 2005    Asthma     Benign intracranial hypertension 01/27/2022    Pseudotumor cerebri    Bipolar disorder, unspecified (Multi)     Bipolar disease, chronic    Breast calcification, right 08/21/2018    Cellulitis of abdominal wall 09/28/2022    Cellulitis of right abdominal wall    Cervicalgia 07/01/2020    Cervicalgia of [id number]-jsadazq-bhheq region    Chronic maxillary sinusitis 01/04/2022    Chronic maxillary sinusitis    Chronic sialoadenitis 03/16/2020    Chronic sialoadenitis    COVID-19 01/06/2022    COVID-19 with multiple  comorbidities    Decreased white blood cell count, unspecified 11/04/2019    Leukopenia    Disease of thyroid gland     Disturbances of salivary secretion 03/16/2020    Xerostomia    Dry eye syndrome of bilateral lacrimal glands 10/07/2022    Dry eyes, bilateral    Encounter for preprocedural cardiovascular examination 02/01/2022    Preoperative cardiovascular examination    Food additives allergy status 06/11/2020    Allergy to food dye    Fracture of nasal bones, initial encounter for closed fracture 03/03/2022    Closed fracture of nasal bone, initial encounter    GERD (gastroesophageal reflux disease) 15 years old    Granuloma of right orbit 10/07/2021    Inflammatory pseudotumor of right orbit    History of endometrial ablation 11/09/2017    Hyperlipidemia     Hypertension     Hyperthyroidism     Hypoglycemia     Hypothyroidism     Immunocompromised     Localized swelling, mass and lump, head 03/24/2022    Swollen nose    Major depressive disorder, recurrent, in full remission 10/07/2021    Depression, major, recurrent, in complete remission    Mammary duct ectasia of left breast 08/24/2022    Periductal mastitis of left breast    Meningitis ([facility]) 2008    Migraine     Nipple discharge 08/24/2022    Bloody discharge from left nipple    Ocular pain, right eye 10/07/2022    Pain in right eye    Optic atrophy     Other abnormal and inconclusive findings on diagnostic imaging of breast 07/06/2020    Other abnormal and inconclusive findings on diagnostic imaging of breast    Other anomalies of pupillary function 05/31/2019    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    Other chest pain 05/18/2020    Chest discomfort    Other conditions influencing health status 08/01/2022    History of cough    Other conditions influencing health status 08/03/2021    Chronic migraine    Other specified disorders of eustachian tube, left ear 11/18/2019    ETD (Eustachian tube dysfunction), left    Other specified disorders  of nose and nasal sinuses 03/24/2022    Nasal dryness    Other specified disorders of nose and nasal sinuses 03/24/2022    Nasal crusting    Pelvic and perineal pain 07/06/2020    Pelvic pain    Personal history of other diseases of the circulatory system 04/07/2020    History of sinus tachycardia    Personal history of other diseases of the circulatory system 04/07/2020    History of abnormal electrocardiography    Personal history of other diseases of the circulatory system     History of Raynaud's syndrome    Personal history of other diseases of the digestive system     History of irritable bowel syndrome    Personal history of other diseases of the digestive system 03/02/2020    History of oral pain    Personal history of other diseases of the musculoskeletal system and connective tissue 01/19/2022    History of neck pain    Personal history of other diseases of the musculoskeletal system and connective tissue 03/02/2021    History of scleroderma    Personal history of other diseases of the musculoskeletal system and connective tissue 06/16/2020    History of muscle weakness    Personal history of other diseases of the nervous system and sense organs 11/18/2019    History of hearing loss    Personal history of other diseases of the nervous system and sense organs 09/21/2022    History of partial seizures    Personal history of other diseases of the respiratory system 04/14/2021    History of asthma    Personal history of other endocrine, nutritional and metabolic disease 02/17/2021    History of diabetes mellitus    Personal history of other mental and behavioral disorders 05/27/2021    History of anxiety    Personal history of other specified conditions 09/07/2022    History of nipple discharge    Personal history of other specified conditions 10/16/2019    History of headache    Personal history of other specified conditions 09/28/2022    History of lump of left breast    Personal history of other specified  conditions 09/16/2021    History of persistent cough    Personal history of other specified conditions 02/01/2022    History of palpitations    Personal history of other specified conditions 03/09/2022    History of headache    Personal history of other specified conditions 02/12/2014    History of chest pain    Personal history of other specified conditions 10/27/2021    History of nausea and vomiting    Personal history of other specified conditions 10/16/2019    History of fatigue    Personal history of other specified conditions 02/26/2021    History of orthopnea    Personal history of other specified conditions 02/22/2021    History of shortness of breath    Personal history of urinary calculi     H/O renal calculi    Polycystic ovary syndrome     Postural orthostatic tachycardia syndrome (POTS)     POTS (postural orthostatic tachycardia syndrome)    Rash and other nonspecific skin eruption 03/15/2022    Rash    Repeated falls 06/23/2021    Recurrent falls    Right lower quadrant pain 10/14/2020    Abdominal pain, RLQ (right lower quadrant)    Seizures (Multi)     Sjogren syndrome, unspecified (Multi)     History of Sjogren's disease    Sjogren's syndrome     Sleep apnea     Slow transit constipation 07/09/2020    Slow transit constipation    Subarachnoid hemorrhage, traumatic (Multi) 04/19/2023    Thyroid nodule     Traumatic subarachnoid hemorrhage with loss of consciousness of unspecified duration, subsequent encounter 03/15/2022    Subarachnoid hemorrhage following injury, with loss of consciousness, subsequent encounter    Traumatic subarachnoid hemorrhage without loss of consciousness, subsequent encounter     Subarachnoid hemorrhage following injury, no loss of consciousness, subsequent encounter    Type 2 diabetes mellitus     Unspecified disorder of refraction 10/07/2022    Refractive error    Unspecified optic neuritis 11/06/2020    Right optic neuritis    Unspecified optic neuritis 11/06/2020     Optic neuritis, right    Unspecified visual loss 09/25/2019    Vision loss    Varicella As a child    Venous insufficiency (chronic) (peripheral) 10/18/2021    Chronic venous insufficiency of lower extremity    Viral infection, unspecified 01/11/2022    Nonspecific syndrome suggestive of viral illness    Vitamin D deficiency     Vitamin D deficiency, unspecified 09/28/2022    Vitamin D deficiency   [2]   Past Surgical History:  Procedure Laterality Date    APPENDECTOMY  2017    BRAIN SURGERY  Union placement to measure pressure    CARDIAC CATHETERIZATION      CHOLECYSTECTOMY      ENDOMETRIAL ABLATION      FRACTURE SURGERY  12/2023    HERNIA REPAIR Right 03/01/2024    with mesh    HYSTERECTOMY  2017    MR HEAD ANGIO WO IV CONTRAST  02/08/2021    MR HEAD ANGIO WO IV CONTRAST 2/8/2021 [facility]    MR NECK ANGIO WO IV CONTRAST  02/08/2021    MR NECK ANGIO WO IV CONTRAST 2/8/2021 [facility]    OTHER SURGICAL HISTORY  08/22/2019    Carpal tunnel surgery    OTHER SURGICAL HISTORY  08/22/2019    Hysterectomy    OTHER SURGICAL HISTORY  08/22/2019    Venous access port placement    OTHER SURGICAL HISTORY  08/22/2019    Cholecystectomy    OTHER SURGICAL HISTORY  08/22/2019    Appendectomy    OTHER SURGICAL HISTORY  08/22/2019    Pyloroplasty    WISDOM TOOTH EXTRACTION  2004   [3]   Family History  Problem Relation Name Age of Onset    Other (Perforated bowel) Mother Radha     Hypertension Mother Radha     Macular degeneration Mother Radha     Depression Mother Radha     Hyperlipidemia Mother Radha     Mental illness Mother Radha     Hyperlipidemia Father Mac     Hypertension Father Mac     Heart attack Father Mac     Other (S/P CABG) Father Mac     Diabetes Father Mac     Prostate cancer Father Mac     Coronary artery disease Father Mac     Heart failure Father Mac     Stroke Father Mac     Cancer Father Mac     Macular degeneration Father Mac     Retinal detachment Father  Mac     Asthma Father Mac     Hearing loss Father Mac     Heart disease Father Mac     Hernia Father Mac     Stroke Sister Jazmin     Breast cancer Sister Jazmin     Lupus Sister Jazmin     Ovarian cancer Sister Jazmin     Astigmatism Sister Jazmin     Arthritis Sister Jazmin     Asthma Sister Jazmin     Depression Sister Jazmin     Mental illness Sister Jazmin     Miscarriages / Stillbirths Sister Jazmin     Vision loss Sister Jazmin     Hyperlipidemia Brother Sanchez     Hypertension Brother Sanchez     Astigmatism Brother Sanchez     Arthritis Brother Sanchez     Asthma Brother Sanchez     Diabetes Father's Sister Linda     Blindness Father's Sister Linda     Vision loss Father's Sister Linda     Thyroid cancer Father's Sister Bekah     Thyroid disease Father's Sister Jessica     Colon cancer Father's Brother ?     Cancer Father's Brother Kian     Anesthesia related problems Father's Brother Kian     Depression Father's Brother Kian     Hernia Father's Brother Kian     Mental illness Father's Brother Kian     Cancer Maternal Grandmother Brenda     Ovarian cancer Maternal Grandmother Brenda     Blindness Other Multiple     Melanoma Other      Asthma Other      Other (hay fever) Other      Allergies Other      Alcohol abuse Paternal Grandfather Elkin     Arthritis Sister Isha     Asthma Sister Isha     Cancer Sister Isha     Depression Sister Isha     Mental illness Sister Isha     Miscarriages / Stillbirths Sister Isha     Ovarian cancer Sister Isha     Glaucoma Neg Hx     [4]   Social History  Tobacco Use    Smoking status: Never    Smokeless tobacco: Never   Vaping Use    Vaping status: Never Used   Substance Use Topics    Alcohol use: Not Currently     Comment: Socially in College    Drug use: Yes     Types: Benzodiazepines         Thyroid disease Father's Sister Jessica     Colon cancer Father's Brother ?     Cancer Father's Brother Kian     Anesthesia related problems Father's Brother Kian     Depression Father's Brother Kian     Hernia Father's Brother Kian     Mental illness Father's Brother Kian     Cancer Maternal Grandmother Brenda     Ovarian cancer Maternal Grandmother Brenda     Blindness Other Multiple     Melanoma Other      Asthma Other      Other (hay fever) Other      Allergies Other      Alcohol abuse Paternal Grandfather Elkin     Arthritis Sister Isha     Asthma Sister Isha     Cancer Sister Isha     Depression Sister Isha     Mental illness Sister Isha     Miscarriages / Stillbirths Sister Isha     Ovarian cancer Sister Isha     Glaucoma Neg Hx     [4]   Social History  Tobacco Use    Smoking status: Never    Smokeless tobacco: Never   Vaping Use    Vaping status: Never Used   Substance Use Topics    Alcohol use: Not Currently     Comment: Socially in College    Drug use: Yes     Types: Benzodiazepines        Patrick Dean MD  05/05/25 1625

## 2025-04-29 NOTE — ED TRIAGE NOTES
Pt to ed for Left side groin pain. States that she has had a hernia on the R side and it was repaired. Also states having nausea

## 2025-04-30 ENCOUNTER — FOLLOW-UP (OUTPATIENT)
Dept: NEUROSURGERY | Facility: HOSPITAL | Age: 50
End: 2025-04-30
Payer: MEDICAID

## 2025-04-30 VITALS
WEIGHT: 265 LBS | DIASTOLIC BLOOD PRESSURE: 85 MMHG | HEART RATE: 90 BPM | SYSTOLIC BLOOD PRESSURE: 130 MMHG | BODY MASS INDEX: 48.47 KG/M2 | RESPIRATION RATE: 17 BRPM

## 2025-04-30 PROCEDURE — 99212 OFFICE O/P EST SF 10 MIN: CPT | Performed by: NEUROLOGICAL SURGERY

## 2025-04-30 RX ORDER — FLUTICASONE PROPIONATE 50 MCG
SPRAY, SUSPENSION (ML) NASAL
Qty: 16 G | Refills: 0 | Status: SHIPPED | OUTPATIENT
Start: 2025-04-30

## 2025-04-30 NOTE — PROGRESS NOTES
1-30-25 revision of shunt , at #3. Was in the ER for dizziness and light headedness, and could not stand up. Today is better.    49-year-old woman with shunt for idiopathic intracranial hypertension returns for follow-up.  She reports that since her shunt has been at setting #3 her headaches and the pressure in her head have been much better.  She notes that she had a bump on her stomach that is distant from the shunt insertion site.  This is also improved.    On exam, she is alert and interactive.  She moves all extremities with full strength.  Her incisions are well-healed.  Interrogation of the shunt demonstrates that it is still at setting #3.    The patient is improved symptomatically.  We discussed if we should further decrease flow through the shunt however she is content where it is for right now.  She will contact my office if anything changes.

## 2025-05-05 ENCOUNTER — APPOINTMENT (OUTPATIENT)
Dept: ENDOCRINOLOGY | Facility: CLINIC | Age: 50
End: 2025-05-05
Payer: MEDICAID

## 2025-05-06 ENCOUNTER — APPOINTMENT (OUTPATIENT)
Dept: ENDOCRINOLOGY | Facility: CLINIC | Age: 50
End: 2025-05-06
Payer: MEDICAID

## 2025-05-06 ENCOUNTER — SPECIALTY PHARMACY (OUTPATIENT)
Dept: PHARMACY | Facility: CLINIC | Age: 50
End: 2025-05-06

## 2025-05-06 PROCEDURE — RXMED WILLOW AMBULATORY MEDICATION CHARGE

## 2025-05-07 ENCOUNTER — TELEPHONE (OUTPATIENT)
Dept: PRIMARY CARE | Facility: CLINIC | Age: 50
End: 2025-05-07
Payer: MEDICAID

## 2025-05-07 NOTE — TELEPHONE ENCOUNTER
DME orders faxed over per . Orders faxed over to [facility].

## 2025-05-08 ENCOUNTER — PHARMACY VISIT (OUTPATIENT)
Dept: PHARMACY | Facility: CLINIC | Age: 50
End: 2025-05-08
Payer: MEDICAID

## 2025-05-12 ENCOUNTER — APPOINTMENT (OUTPATIENT)
Dept: ENDOCRINOLOGY | Facility: CLINIC | Age: 50
End: 2025-05-12
Payer: MEDICAID

## 2025-05-12 ENCOUNTER — APPOINTMENT (OUTPATIENT)
Dept: PAIN MEDICINE | Facility: HOSPITAL | Age: 50
End: 2025-05-12
Payer: MEDICAID

## 2025-05-15 ENCOUNTER — PATIENT OUTREACH (OUTPATIENT)
Dept: PRIMARY CARE | Facility: CLINIC | Age: 50
End: 2025-05-15
Payer: MEDICAID

## 2025-05-15 NOTE — PROGRESS NOTES
Pt called and stated that [facility] stated her insurance does not cover the bed bath wipes, creams and powders that were sent. They stated she may need a new dme company as they do not send for prior auths for this equipment.        She also asked about getting her air conditioner fixed that she got from insurance/waiver about 4 years ago  Left message for Indy Naranjo  and await if she can help

## 2025-05-16 ENCOUNTER — TELEMEDICINE (OUTPATIENT)
Dept: ENDOCRINOLOGY | Facility: CLINIC | Age: 50
End: 2025-05-16
Payer: MEDICAID

## 2025-05-16 NOTE — PROGRESS NOTES
Clinical Pharmacy Team met with Jonathan Ayala regarding a consultation for diabetes management thanks to a referral from Dr. Marah Felix MD. Below is a summary of our conversation and recommendations:    ________________________________________________________________________      Allergies   Allergen Reactions    Acetazolamide Hives, Rash, Shortness of breath and Swelling     oral hives, redness, ABD pain    Atorvastatin Unknown     Causes muscle pain    Cefdinir Itching, Rash, Shortness of breath and Anaphylaxis     Itchy throat    Throat closing / difficulty breathing.  Took Benadryl at home.    Itchy throat      Throat closing / difficulty breathing.  Took Benadryl at home.    Ceftriaxone Anaphylaxis, Rash, Shortness of breath and Swelling     SOB    SOB hives turned red during gallbladder    Doxepin Anaphylaxis, Hives, Swelling, Angioedema and Rash     Itchy sore throat problems with swallowing    facial swelling    Itchy sore throat problems with swallowing      facial swelling    Duloxetine Anaphylaxis, Hallucinations and Rash     suicidal ideation    suicidal ideation     Other reaction(s): suicidal ideation    suicidal    Suicidal ideation    Levofloxacin Angioedema, Swelling and Rash     eyelid swelling    eyelid swelling. Patient tolerates Avelox    Levofloxacin In D5w Anaphylaxis     Moon face lips swelling just Levaquin tolerated D5W)    Nutritional Supplements Anaphylaxis     Grapes ( red dye    Prochlorperazine Anaphylaxis     HIGH Compazine involuntary muscle spasms low doses tolerated)    Red Dye Anaphylaxis    Becky Anaphylaxis and Swelling     Becky    SOB facial swelling    Rosemary Oil Anaphylaxis, Itching and Swelling     Becky  SOB facial swelling      SOB facial swelling    Strawberry Shortness of breath     White blisters in mouth      Other reaction(s): white blisters in mouth, shortness of breath    Strawberry Flavor Anaphylaxis     Allergy to strawberry fruit and juice     "Sulfa (Sulfonamide Antibiotics) Anaphylaxis, Rash, Shortness of breath and Swelling     SOB itchy hives    Other Reaction(s): rash, SOB      SOB itchy hives    Topiramate Anaphylaxis, Swelling and Angioedema     eyelid swelling    Throat swelling    eyelid swelling  Throat swelling      Throat swelling      eyelid swelling    Tree Nuts Shortness of breath     walnuts    Tree Pollen-Black [place] Shortness of breath     Other Reaction(s): Other: See Comments      White blisters in mouth      blisters in mouth-carries an EPIPEN-\"I have gotten short of breath\"    Tree Pollen-Pecan Shortness of breath     pecans    [id number] Antimigraine Agents Anaphylaxis and Nausea Only     \"[id number]- anti migraine agents and DHE: can cause stroke per pt \"    None due to Raynaud's  ( Triptans cause a stroke)    [place] Shortness of breath    Aripiprazole Unknown, Fever and Other     fever and tremors    Fevers and Tremors    Tremor    Aspartame Diarrhea     Blood sugar [place], Diarrhea    Aspartame (Bulk) Diarrhea, Nausea Only and Nausea/vomiting     Other Reaction(s): nausea, diarrhea, abdominal pain      Blood sugar [place], Diarrhea    Fenofibrate Other     Double vision    Gluten Itching, Other and Rash     Rashes constipation gastric discomfort    Hydrochlorothiazide Hives, Itching and Rash     hctz removed as free-text allergy and entered as [facility] allergy,1455 10/6/2007JO      itchy hives    Hydromorphone Unknown, GI intolerance and Nausea Only     Intolerance nausea instant    Iron Hives and Rash     ichy hive ( can tolerate ferrous gluconate)    Meclizine Angioedema, Other and Swelling     eyelid swelling    Swelling of the eyelids    Eyelids and face    Metformin Other     Other reaction(s): Dyspepsia, Dyspepsia    Propoxyphene Unknown and Hives     Darvocet itchy hives    Statins-Hmg-Coa Reductase Inhibitors Unknown     Double vision    Tetracyclines Hives, Itching and Rash     sulfa    Rash itching    Itchy  rash    " Thiazides Hives, Itching and Rash     itchy hives    Venlafaxine Unknown and Fever     Fevers chills    Wheat Unknown     oral blisters    Adhesive Tape-Silicones Itching and Other    Barbiturates Unknown    Dhe Unknown     Raynaud's disease    Diet Foods Diarrhea     Aspartame allergy only. Removes to many foods to interface.    Ferrous Sulfate Hives    Nsaids (Non-Steroidal Anti-Inflammatory Drug) Unknown and Nausea/vomiting    Other Unknown    Sulfonylureas Unknown    Tobramycin Unknown    Vancomycin Unknown and Hives     Niyah syndrome    Other reaction(s): Other    Red man syndrome if given fast, benadryl with.     Niyah syndrome  Other reaction(s): Other      Other reaction(s): Other      Red man syndrome if given fast, benadryl with.    Adhesive Rash    Azithromycin Hives, Itching and Rash    Betamethasone Nausea And Vomiting, Other, GI intolerance and Diarrhea     N/V/D    House Dust Rash    Metoclopramide Hcl Other    Milk Unknown, Itching and Rash     ABD pain    Prednisone Rash    Propoxyphene-Acetaminophen Hives and Rash    Sulfacetamide Sodium Rash and Swelling    Zolpidem Other and Hallucinations     Sleep walk    Other Reaction(s): insomnia and agitation      Sleep walk         Diabetes Pharmacotherapy:   Toujeo 54 units subcutaneous once daily  Humalog with an ICR of 1:2 with breakfast and  and ISF of 1:30    Patient reports tolerating this regimen well.      Lab Review  Lab Results   Component Value Date    BILITOT 0.3 08/06/2023    CALCIUM 9.5 08/06/2023    CO2 23 08/06/2023     08/06/2023    CREATININE 0.82 08/06/2023    GLUCOSE 135 (H) 08/06/2023    ALKPHOS 81 08/06/2023    K 4.2 08/06/2023    PROT 8.8 (H) 08/06/2023     08/06/2023    AST 21 08/06/2023    ALT 27 08/06/2023    BUN 17 08/06/2023    ANIONGAP 16 08/06/2023    MG 2.16 07/28/2023    PHOS 3.8 04/30/2023    LDH 95 07/17/2020    ALBUMIN 4.3 08/06/2023    LIPASE 20 04/30/2023    GFRF 89 08/06/2023    GFRMALE CANCELED  2023     Lab Results   Component Value Date    TRIG CANCELED 2023    CHOL CANCELED 2023    HDL CANCELED 2023     Lab Results   Component Value Date    HGBA1C 5.9 (H) 2023    HGBA1C 7.8 (A) 2023    HGBA1C 8.3 (A) 2022     The 10-year ASCVD risk score (Mamie SANDERSON, et al., 2019) is: 3.4%    Values used to calculate the score:      Age: 48 years      Sex: Female      Is Non- : No      Diabetic: Yes      Tobacco smoker: No      Systolic Blood Pressure: 146 mmHg      Is BP treated: Yes      HDL Cholesterol: 46.3 mg/dL      Total Cholesterol: 186 mg/dL          Assessment/Plan     The patient reports today for a diabetes consultation. Reviewed CGM report and discussed it with the patient. Today we did an omnipod 5 pump training. Discussed major training points. Reviewed points listed in omnipod 5 training check list. Answered all patient questions. Patient was able to successfully self apply her omnipod 5 with dexcom g6. Will follow up with patient.       PATIENT EDUCATION/GOALS    Goals  Fasting B - 130 mg/dL  Postprandial BG: less than 180 mg/dL  A1c: less than 7%    Type of encounter: [virtual]    Provided counseling on lifestyle modifications, medications, and self-monitoring. Patient has no additional questions at this time. Patient has scheduled endo visit in 1 month. Please reach out with any questions. Thank you.       Joanna Evans, PharmD    Provider on site: Jazz Mccall CNP  Continue all meds under the continuation of care with the referring provider and clinical pharmacy      wall    Cervicalgia 07/01/2020    Cervicalgia of [id number] region    Chronic maxillary sinusitis 01/04/2022    Chronic maxillary sinusitis    Chronic sialoadenitis 03/16/2020    Chronic sialoadenitis    COVID-19 01/06/2022    COVID-19 with multiple comorbidities    Decreased white blood cell count, unspecified 11/04/2019    Leukopenia    Disease of thyroid gland     Disturbances of salivary secretion 03/16/2020    Xerostomia    Dry eye syndrome of bilateral lacrimal glands 10/07/2022    Dry eyes, bilateral    Encounter for preprocedural cardiovascular examination 02/01/2022    Preoperative cardiovascular examination    Food additives allergy status 06/11/2020    Allergy to food dye    Fracture of nasal bones, initial encounter for closed fracture 03/03/2022    Closed fracture of nasal bone, initial encounter    GERD (gastroesophageal reflux disease) 15 years old    Granuloma of right orbit 10/07/2021    Inflammatory pseudotumor of right orbit    History of endometrial ablation 11/09/2017    Hyperlipidemia     Hypertension     Hyperthyroidism     Hypoglycemia     Hypothyroidism     Immunocompromised     Localized swelling, mass and lump, head 03/24/2022    Swollen nose    Major depressive disorder, recurrent, in full remission 10/07/2021    Depression, major, recurrent, in complete remission    Mammary duct ectasia of left breast 08/24/2022    Periductal mastitis of left breast    Meningitis ([facility] 2008    Migraine     Nipple discharge 08/24/2022    Bloody discharge from left nipple    Ocular pain, right eye 10/07/2022    Pain in right eye    Optic atrophy     Other abnormal and inconclusive findings on diagnostic imaging of breast 07/06/2020    Other abnormal and inconclusive findings on diagnostic imaging of breast    Other anomalies of pupillary function 05/31/2019    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    Other chest pain 05/18/2020    Chest discomfort    Other conditions influencing  health status 08/01/2022    History of cough    Other conditions influencing health status 08/03/2021    Chronic migraine    Other specified disorders of eustachian tube, left ear 11/18/2019    ETD (Eustachian tube dysfunction), left    Other specified disorders of nose and nasal sinuses 03/24/2022    Nasal dryness    Other specified disorders of nose and nasal sinuses 03/24/2022    Nasal crusting    Pelvic and perineal pain 07/06/2020    Pelvic pain    Personal history of other diseases of the circulatory system 04/07/2020    History of sinus tachycardia    Personal history of other diseases of the circulatory system 04/07/2020    History of abnormal electrocardiography    Personal history of other diseases of the circulatory system     History of Raynaud's syndrome    Personal history of other diseases of the digestive system     History of irritable bowel syndrome    Personal history of other diseases of the digestive system 03/02/2020    History of oral pain    Personal history of other diseases of the musculoskeletal system and connective tissue 01/19/2022    History of neck pain    Personal history of other diseases of the musculoskeletal system and connective tissue 03/02/2021    History of scleroderma    Personal history of other diseases of the musculoskeletal system and connective tissue 06/16/2020    History of muscle weakness    Personal history of other diseases of the nervous system and sense organs 11/18/2019    History of hearing loss    Personal history of other diseases of the nervous system and sense organs 09/21/2022    History of partial seizures    Personal history of other diseases of the respiratory system 04/14/2021    History of asthma    Personal history of other endocrine, nutritional and metabolic disease 02/17/2021    History of diabetes mellitus    Personal history of other mental and behavioral disorders 05/27/2021    History of anxiety    Personal history of other specified  conditions 09/07/2022    History of nipple discharge    Personal history of other specified conditions 10/16/2019    History of headache    Personal history of other specified conditions 09/28/2022    History of lump of left breast    Personal history of other specified conditions 09/16/2021    History of persistent cough    Personal history of other specified conditions 02/01/2022    History of palpitations    Personal history of other specified conditions 03/09/2022    History of headache    Personal history of other specified conditions 02/12/2014    History of chest pain    Personal history of other specified conditions 10/27/2021    History of nausea and vomiting    Personal history of other specified conditions 10/16/2019    History of fatigue    Personal history of other specified conditions 02/26/2021    History of orthopnea    Personal history of other specified conditions 02/22/2021    History of shortness of breath    Personal history of urinary calculi     H/O renal calculi    Polycystic ovary syndrome     Postural orthostatic tachycardia syndrome (POTS)     POTS (postural orthostatic tachycardia syndrome)    Rash and other nonspecific skin eruption 03/15/2022    Rash    Repeated falls 06/23/2021    Recurrent falls    Right lower quadrant pain 10/14/2020    Abdominal pain, RLQ (right lower quadrant)    Seizures (Multi)     Sjogren syndrome, unspecified (Multi)     History of Sjogren's disease    Sjogren's syndrome     Sleep apnea     Slow transit constipation 07/09/2020    Slow transit constipation    Subarachnoid hemorrhage, traumatic (Multi) 04/19/2023    Thyroid nodule     Traumatic subarachnoid hemorrhage with loss of consciousness of unspecified duration, subsequent encounter 03/15/2022    Subarachnoid hemorrhage following injury, with loss of consciousness, subsequent encounter    Traumatic subarachnoid hemorrhage without loss of consciousness, subsequent encounter     Subarachnoid hemorrhage  following injury, no loss of consciousness, subsequent encounter    Type 2 diabetes mellitus     Unspecified disorder of refraction 10/07/2022    Refractive error    Unspecified optic neuritis 11/06/2020    Right optic neuritis    Unspecified optic neuritis 11/06/2020    Optic neuritis, right    Unspecified visual loss 09/25/2019    Vision loss    Varicella As a child    Venous insufficiency (chronic) (peripheral) 10/18/2021    Chronic venous insufficiency of lower extremity    Viral infection, unspecified 01/11/2022    Nonspecific syndrome suggestive of viral illness    Vitamin D deficiency     Vitamin D deficiency, unspecified 09/28/2022    Vitamin D deficiency   [2]   Current Outpatient Medications on File Prior to Visit   Medication Sig Dispense Refill    acetaminophen (Tylenol) 325 mg tablet Take 1-2 tablets (325-650 mg) by mouth every 6 hours if needed for mild pain (1 - 3). Days of infusions      Advair -21 mcg/actuation inhaler INHALE TWO PUFFS BY MOUTH AS INSTRUCTED TWO TIMES A DAY.      amitriptyline (Elavil) 100 mg tablet Take 1 tablet (100 mg) by mouth once daily at bedtime. 30 tablet 11    azelastine (Astelin) 137 mcg (0.1 %) nasal spray Administer 1 spray into each nostril 2 times a day. Use in each nostril as directed      blood-glucose sensor (DEXCOM G7 SENSOR MISC) Use to check blood sugar continuously throughout the day as directed. Change sensor every 10 days.      calcium-vitamin D3-vitamin K (Viactiv) 650 mg-12.5 mcg-40 mcg chewable tablet Chew 2 tablets once daily. At lunch      carboxymethylcellulose (Refresh Celluvisc) 1 % ophthalmic solution dropperette Administer 2 drops into both eyes 3 times a day as needed for dry eyes.      cholecalciferol (Vitamin D-3) 5,000 Units tablet Take 1 tablet (125 mcg) by mouth once daily.      clonazePAM (KlonoPIN) 0.5 mg tablet Take 1 tablet (0.5 mg) by mouth 2 times a day.      Dexcom G7  misc Use as instructed to check blood sugars  continuously throughout the day      divalproex (Depakote ER) 500 mg 24 hr tablet Take 1 tablet (500 mg) by mouth 2 times a day.      EPINEPHrine 0.3 mg/0.3 mL injection syringe INJECT INTRAMUSCULARLY ONCE AS NEEDED FOR ANAPHYLAXIS 2 each 0    ezetimibe (Zetia) 10 mg tablet Take 1 tablet once daily 90 tablet 2    fluconazole (Diflucan) 150 mg tablet 1 tablet by mouth daily spread 72 hours apart 2 tablet 6    folic acid (Folvite) 1 mg tablet Take 1 tablet (1 mg) by mouth once daily. 90 tablet 3    furosemide (Lasix) 20 mg tablet Take 0.5 tablets (10 mg) by mouth 2 times a day. 30 tablet 3    gloves, latex with aloe vera misc Large size gloves 200 each 3    glucagon (Baqsimi) 3 mg/actuation spray,non-aerosol USE ONE SPRAY IN THE NOSE AS NEEDED FOR LOW BLOOD SUGAR  MAY REPEAT AFTER 15 MINUTES USING A NEW DEVICE IF NO RESPONSE 1 each 3    glucagon (Glucagen) 1 mg injection Inject 1 mg under the skin 1 time if needed for low blood sugar - see comments (hypoglycemia). 1 each 12    hydrocortisone (Cortef) 10 mg tablet Take 1 tablet (10 mg) by mouth 2 times a day. Double the dose if sick for three days 70 tablet 11    immune globulin, human, (Gammagard) infusion Infuse 600 mL (60 g) into a venous catheter every 14 (fourteen) days.      insulin glargine (Toujeo Max Solostar- 2 unit dial) 300 unit/mL (3 mL) pen Inject 54 units under the skin every morning 6 mL 6    insulin lispro (HumaLOG KwikPen Insulin) 100 unit/mL injection Use as directed to give up to 100 units a day 90 mL 3    lacosamide (Vimpat) 150 mg tablet tablet Take 2 tablets (300 mg) by mouth 2 times a day. 120 tablet 1    levETIRAcetam (Keppra) 750 mg tablet Take 2 tablets (1,500 mg) by mouth 2 times a day. 120 tablet 1    levothyroxine (Synthroid, Levoxyl) 50 mcg tablet TAKE 1 & 1/2 TABLETS (75 MCG) BY MOUTH ONCE DAILY IN THE MORNING. TAKE BEFORE MEALS. 45 tablet 1    losartan (Cozaar) 25 mg tablet Take 1 tablet (25 mg) by mouth once daily. 100 tablet 3     "miconazole (Micotin) 2 % cream Apply 1 Application topically once daily as needed (rash).      miconazole (Micotin) 2 % powder Apply to groin , under the breast and skin folds daily      moisturizing mouth (Biotene Oral Dry Mouth) solution Take 1 spray by mouth 4 times a day as needed.      Motegrity 2 mg tablet Take 1 tablet (2 mg) by mouth once daily.      nasal spray Nayzilam 5 mg/spray (0.1 mL) spray,non-aerosol Administer into affected nostril(s) 1 time.      ondansetron ODT (Zofran-ODT) 8 mg disintegrating tablet Dissolve 1 tablet (8 mg) in the mouth every 8 hours if needed for nausea or vomiting. 20 tablet 0    OneTouch Delica Plus Lancet 33 gauge misc USE 1 LANCET TO CHECK GLUCOSE ONCE DAILY AS DIRECTED      OneTouch Verio test strips strip USE 1 STRIP TO CHECK GLUCOSE ONCE DAILY AS DIRECTED      pantoprazole (Protonix) 40 mg EC tablet Take 1 tablet (40 mg) by mouth 2 times a day before meals.      pen needle, diabetic (BD Lela 2nd Gen Pen Needle) 32 gauge x 5/32\" needle Use as directed with insulin pen up to 5 times daily 500 each 2    polyethylene glycol (Glycolax, Miralax) 17 gram/dose powder Mix 17 g of powder and drink 3 times a day as needed for constipation.      rOPINIRole (Requip) 0.5 mg tablet Take 1 tablet by mouth (0.5mg) in the morning and 2 tablets (1mg) by mouth in the evening      senna 8.6 mg tablet Take 1 tablet (8.6 mg) by mouth once daily at bedtime.      tiZANidine (Zanaflex) 2 mg tablet Take 1 tablet (2 mg) by mouth 2 times a day.      ubrogepant (Ubrelvy) 100 mg tablet Take 1 tablet (100 mg) by mouth if needed (migraine). May repeat in 2 hours for max of 200mg per 24 hours. 16 tablet 3    ZINC ORAL Take 50 mg by mouth once daily.       No current facility-administered medications on file prior to visit.     "

## 2025-05-19 ENCOUNTER — SPECIALTY PHARMACY (OUTPATIENT)
Dept: PHARMACY | Facility: CLINIC | Age: 50
End: 2025-05-19

## 2025-05-20 ENCOUNTER — APPOINTMENT (OUTPATIENT)
Dept: PRIMARY CARE | Facility: CLINIC | Age: 50
End: 2025-05-20
Payer: MEDICAID

## 2025-05-20 VITALS
DIASTOLIC BLOOD PRESSURE: 77 MMHG | SYSTOLIC BLOOD PRESSURE: 125 MMHG | OXYGEN SATURATION: 96 % | WEIGHT: 262.8 LBS | BODY MASS INDEX: 48.36 KG/M2 | HEART RATE: 93 BPM | HEIGHT: 62 IN

## 2025-05-20 DIAGNOSIS — E78.5 DYSLIPIDEMIA ASSOCIATED WITH TYPE 2 DIABETES MELLITUS: ICD-10-CM

## 2025-05-20 DIAGNOSIS — I47.10 SVT (SUPRAVENTRICULAR TACHYCARDIA): ICD-10-CM

## 2025-05-20 DIAGNOSIS — E66.813 CLASS 3 SEVERE OBESITY DUE TO EXCESS CALORIES WITH SERIOUS COMORBIDITY AND BODY MASS INDEX (BMI) OF 45.0 TO 49.9 IN ADULT: ICD-10-CM

## 2025-05-20 DIAGNOSIS — J45.50 SEVERE PERSISTENT ASTHMA WITHOUT COMPLICATION (MULTI): ICD-10-CM

## 2025-05-20 DIAGNOSIS — G61.9 INFLAMMATORY NEUROPATHY (MULTI): ICD-10-CM

## 2025-05-20 DIAGNOSIS — E06.3 HYPOTHYROIDISM DUE TO HASHIMOTO THYROIDITIS: ICD-10-CM

## 2025-05-20 DIAGNOSIS — D83.9 CVID (COMMON VARIABLE IMMUNODEFICIENCY): ICD-10-CM

## 2025-05-20 DIAGNOSIS — J84.9 INTERSTITIAL PULMONARY DISEASE (MULTI): ICD-10-CM

## 2025-05-20 DIAGNOSIS — Z79.4 TYPE 2 DIABETES MELLITUS WITH HYPERGLYCEMIA, WITH LONG-TERM CURRENT USE OF INSULIN: Primary | ICD-10-CM

## 2025-05-20 DIAGNOSIS — E11.69 DYSLIPIDEMIA ASSOCIATED WITH TYPE 2 DIABETES MELLITUS: ICD-10-CM

## 2025-05-20 DIAGNOSIS — E11.65 TYPE 2 DIABETES MELLITUS WITH HYPERGLYCEMIA, WITH LONG-TERM CURRENT USE OF INSULIN: Primary | ICD-10-CM

## 2025-05-20 DIAGNOSIS — G90.1 DYSAUTONOMIA (MULTI): ICD-10-CM

## 2025-05-20 DIAGNOSIS — M34.9 SYSTEMIC SCLEROSIS (MULTI): ICD-10-CM

## 2025-05-20 DIAGNOSIS — E27.40 ADRENAL INSUFFICIENCY (MULTI): ICD-10-CM

## 2025-05-20 DIAGNOSIS — G40.209 PARTIAL EPILEPSY WITH IMPAIRMENT OF CONSCIOUSNESS: ICD-10-CM

## 2025-05-20 DIAGNOSIS — D80.1 HYPOGAMMAGLOBULINEMIA (MULTI): ICD-10-CM

## 2025-05-20 PROBLEM — J45.40 MODERATE PERSISTENT ALLERGIC ASTHMA (HHS-HCC): Status: RESOLVED | Noted: 2023-04-19 | Resolved: 2025-05-20

## 2025-05-20 PROBLEM — G40.909 SEIZURE DISORDER (MULTI): Status: RESOLVED | Noted: 2024-06-18 | Resolved: 2025-05-20

## 2025-05-20 PROCEDURE — 1036F TOBACCO NON-USER: CPT | Performed by: INTERNAL MEDICINE

## 2025-05-20 PROCEDURE — 3008F BODY MASS INDEX DOCD: CPT | Performed by: INTERNAL MEDICINE

## 2025-05-20 PROCEDURE — 4010F ACE/ARB THERAPY RXD/TAKEN: CPT | Performed by: INTERNAL MEDICINE

## 2025-05-20 PROCEDURE — 99215 OFFICE O/P EST HI 40 MIN: CPT | Performed by: INTERNAL MEDICINE

## 2025-05-20 PROCEDURE — 3074F SYST BP LT 130 MM HG: CPT | Performed by: INTERNAL MEDICINE

## 2025-05-20 PROCEDURE — 3078F DIAST BP <80 MM HG: CPT | Performed by: INTERNAL MEDICINE

## 2025-05-20 RX ORDER — BUDESONIDE 0.5 MG/2ML
0.5 INHALANT ORAL
COMMUNITY

## 2025-05-20 RX ORDER — ARFORMOTEROL TARTRATE 15 UG/2ML
15 SOLUTION RESPIRATORY (INHALATION) 2 TIMES DAILY
COMMUNITY

## 2025-05-20 ASSESSMENT — PATIENT HEALTH QUESTIONNAIRE - PHQ9
1. LITTLE INTEREST OR PLEASURE IN DOING THINGS: SEVERAL DAYS
2. FEELING DOWN, DEPRESSED OR HOPELESS: NOT AT ALL
SUM OF ALL RESPONSES TO PHQ9 QUESTIONS 1 AND 2: 1
10. IF YOU CHECKED OFF ANY PROBLEMS, HOW DIFFICULT HAVE THESE PROBLEMS MADE IT FOR YOU TO DO YOUR WORK, TAKE CARE OF THINGS AT HOME, OR GET ALONG WITH OTHER PEOPLE: NOT DIFFICULT AT ALL

## 2025-05-20 NOTE — PROGRESS NOTES
Subjective   Jonathan Ayala is a 49 y.o. female who presents for Follow-up.  HPI  This is a diabetes follow up visit for Jonathan Ayala. The current treatment includes basal insulin, mealtime insulin, Sliding Scale Insulin, and insulin pump and she is compliant all of the time. Symptoms include none. Glucose is monitored: with a CGM device (continuous), and average sugars are 100 - 150. she reports good compliance with a low carbohydrate diet, and does not exercise.    Patient is prescribed an ACE/ARB/ARNI medication   Patient is not prescribed a STATIN medication  Patient is not prescribed a SGLT2/GLP1 medication    Last Flu Vaccine given:            12/16/2023   Last PCV Vaccine given:  10/08/2021     Lab Results   Component Value Date    HGBA1C 8.7 (H) 03/04/2025    CREATININE 0.84 04/29/2025    MICROALBCREA 117 (H) 03/04/2025    LDLCALC 111 (H) 03/04/2025        Last Eye Exam: 4/4/25     Liver Screening: FIB-4 Calculation: 0.65 at 4/29/2025  1:41 AM  Calculated from:  SGOT/AST: 12 U/L at 4/29/2025  1:41 AM  SGPT/ALT: 12 U/L at 4/29/2025  1:41 AM  Platelets: 251 x10*3/uL at 4/29/2025  1:41 AM  Age: 49 years    Interpretation: <1.45 Cirrhosis less likely, 1.45 - 3.25 Indeterminate, >3.25 Cirrhosis more likely    Review of Systems   All other systems reviewed and are negative.    Visit Vitals  /77   Pulse 93   Body mass index is 48.07 kg/m².   Objective   Physical Exam  Vitals and nursing note reviewed.   Constitutional:       General: She is not in acute distress.     Appearance: Normal appearance. She is well-developed. She is obese. She is not toxic-appearing.   HENT:      Head: Normocephalic and atraumatic.      Right Ear: Tympanic membrane and external ear normal.      Left Ear: Tympanic membrane and external ear normal.      Nose: Nose normal.      Mouth/Throat:      Mouth: Mucous membranes are moist.      Pharynx: Oropharynx is clear. No oropharyngeal exudate or posterior oropharyngeal erythema.       Tonsils: No tonsillar exudate. 2+ on the right. 2+ on the left.   Eyes:      Extraocular Movements: Extraocular movements intact.      Conjunctiva/sclera: Conjunctivae normal.   Cardiovascular:      Rate and Rhythm: Normal rate and regular rhythm.      Pulses: Normal pulses.      Heart sounds: Normal heart sounds. No murmur heard.  Pulmonary:      Effort: Pulmonary effort is normal.      Breath sounds: Normal breath sounds.   Abdominal:      General: Abdomen is flat. Bowel sounds are normal.      Palpations: Abdomen is soft.   Musculoskeletal:      Cervical back: Neck supple.   Lymphadenopathy:      Cervical: No cervical adenopathy.   Skin:     General: Skin is warm and dry.      Findings: No rash.   Neurological:      Mental Status: She is alert. Mental status is at baseline.   Psychiatric:         Mood and Affect: Mood normal.         Behavior: Behavior normal.         Thought Content: Thought content normal.         Judgment: Judgment normal.     Lifestyle Recommendations  I recommend a whole-food plant-based diet, an eating pattern that encourages the consumption of unrefined plant foods (such as fruits, vegetables, tubers, whole grains, legumes, nuts and seeds) and discourages meats, dairy products, eggs and processed foods.     The AHA/ACC recommends that the patient consume a dietary pattern that emphasizes intake of vegetables, fruits, and whole grains; includes low-fat dairy products, poultry, fish, legumes, non-tropical vegetable oils, and nuts; and limits intake of sodium, sweets, sugar-sweetened beverages, and red meats.  Adapt this dietary pattern to appropriate calorie requirements (a 500-750 kcal/day deficit to loose weight), personal and cultural food preferences, and nutrition therapy for other medical conditions (including diabetes).  Achieve this pattern by following plans such as the [place] Mediterranean, DASH dietary pattern, or AHA diet.     Engage in 2 hours and 30 minutes per week of  moderate-intensity physical activity, or 1 hour and 15 minutes (75 minutes) per week of vigorous-intensity aerobic physical activity, or an equivalent combination of moderate and vigorous-intensity aerobic physical activity. Aerobic activity should be performed in episodes of at least 10 minutes preferably spread throughout the week.     Adhering to a heart healthy diet, regular exercise habits, avoidance of tobacco products, and maintenance of a healthy weight are crucial components of their heart disease risk reduction.  This may not meet the criteria for a clinical depression diagnosis. Symptoms were reviewed with Jonathan.  Follow-up within the next 3 months is recommended to re-assess symptoms and monitor mental health status.     Normal BILATERAL diabetic foot exam: Pulses are palpable, sensation is intact, and there are no ulcers or lesions.     Assessment & Plan  Class 3 severe obesity due to excess calories with serious comorbidity and body mass index (BMI) of 45.0 to 49.9 in adult  The patient received Current weight: 119 kg (262 lb 12.8 oz)  Weight change since last visit (-) denotes wt loss -2.2 lbs   Weight loss needed to achieve BMI 25: 126.4 Lbs  Weight loss needed to achieve BMI 30: 99.1 Lbs    Provided instructions on dietary changes  Provided instructions on exercise  Advised to Increase physical activity because they have an above normal BMI.        Type 2 diabetes mellitus with hyperglycemia, with long-term current use of insulin  Working with endocrinologist to obtain insulin pump to improve overall blood sugar control difficult in the setting of chronic use of hydrocortisone given for adrenal insufficiency and also treatment with immunotherapy continues on Toujeo insulin 54 units in the morning with insulin lispro Humalog sliding scale with meals continue losartan 25 mg daily to reduce risk of microalbuminuria  Orders:    Comprehensive Metabolic Panel; Future    Hemoglobin A1C; Future    Lipid  Panel; Future    Albumin-Creatinine Ratio, Urine Random; Future    Hypothyroidism due to Hashimoto thyroiditis  Clinically euthyroid continue levothyroxine 50 mcg daily reevaluate 3 months  Orders:    Tsh With Reflex To Free T4 If Abnormal; Future    Dyslipidemia associated with type 2 diabetes mellitus  Reevaluate lipid profile see if patient would benefit from statin at this time without major side effects continue ezetimibe 10 mg daily  Orders:    Comprehensive Metabolic Panel; Future    Hemoglobin A1C; Future    Lipid Panel; Future    Albumin-Creatinine Ratio, Urine Random; Future    Inflammatory neuropathy (Multi)  Continue to mediate symptoms with neurologist       Dysautonomia (Multi)  Continue with management of diabetic dysautonomia complicated by 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       SVT (supraventricular tachycardia)  Stable on propranolol LA 60 mg daily       Partial epilepsy with impairment of consciousness  Seizures well-controlled on levetiracetam 1500 mg twice a day with lacosamide 300 mg twice a day and Depakote  mg twice a day follow with neurologist       CVID (common variable immunodeficiency)  Continue Gammagard infusions stable at this time       Hypogammaglobulinemia (Multi)  Continue Gammagard infusions per rheumatologist       Severe persistent asthma without complication (Multi)  Stable at this time optimize regimen with nebulizer treatments using budesonide and Brovana continue Singulair or montelukast 10 mg daily       Interstitial pulmonary disease (Multi)  Monitor with pulmonologist stable at this time       Adrenal insufficiency (Multi)  Continue hydrocortisone 10 mg twice a day       Systemic sclerosis (Multi)  Continues to follow with rheumatology stable at this time     Time spent coordination of care 45 minutes            Isidoro Mensah DO 05/20/25 7:29 PM            Patient was identified as a fall risk. Risk prevention instructions provided.

## 2025-05-20 NOTE — ASSESSMENT & PLAN NOTE
Stable at this time optimize regimen with nebulizer treatments using budesonide and Brovana continue Singulair or montelukast 10 mg daily

## 2025-05-20 NOTE — PROGRESS NOTES
"Subjective   Patient ID: Jonathan Ayala is a 49 y.o. female who presents for Follow-up.    HPI     Review of Systems    Objective   /77   Pulse 93   Ht 1.575 m (5' 2\")   Wt 119 kg (262 lb 12.8 oz)   SpO2 96%   BMI 48.07 kg/m²     Physical Exam    Assessment/Plan          "

## 2025-05-20 NOTE — ASSESSMENT & PLAN NOTE
Working with endocrinologist to obtain insulin pump to improve overall blood sugar control difficult in the setting of chronic use of hydrocortisone given for adrenal insufficiency and also treatment with immunotherapy continues on Toujeo insulin 54 units in the morning with insulin lispro Humalog sliding scale with meals continue losartan 25 mg daily to reduce risk of microalbuminuria  Orders:    Comprehensive Metabolic Panel; Future    Hemoglobin A1C; Future    Lipid Panel; Future    Albumin-Creatinine Ratio, Urine Random; Future

## 2025-05-20 NOTE — ASSESSMENT & PLAN NOTE
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

## 2025-05-20 NOTE — PATIENT INSTRUCTIONS

## 2025-05-20 NOTE — ASSESSMENT & PLAN NOTE
Clinically euthyroid continue levothyroxine 50 mcg daily reevaluate 3 months  Orders:    Tsh With Reflex To Free T4 If Abnormal; Future

## 2025-05-20 NOTE — ASSESSMENT & PLAN NOTE
Continues to follow with rheumatology stable at this time     Time spent coordination of care 45 minutes

## 2025-05-22 DIAGNOSIS — J30.89 PERENNIAL ALLERGIC RHINITIS WITH SEASONAL VARIATION: ICD-10-CM

## 2025-05-22 DIAGNOSIS — J30.2 PERENNIAL ALLERGIC RHINITIS WITH SEASONAL VARIATION: ICD-10-CM

## 2025-05-22 DIAGNOSIS — G44.40 ANALGESIC REBOUND HEADACHE: ICD-10-CM

## 2025-05-22 DIAGNOSIS — J45.40 MODERATE PERSISTENT ALLERGIC ASTHMA (HHS-HCC): ICD-10-CM

## 2025-05-22 DIAGNOSIS — T39.95XA ANALGESIC REBOUND HEADACHE: ICD-10-CM

## 2025-05-23 RX ORDER — PROPRANOLOL HYDROCHLORIDE 60 MG/1
CAPSULE, EXTENDED RELEASE ORAL
Qty: 30 CAPSULE | Refills: 0 | Status: SHIPPED | OUTPATIENT
Start: 2025-05-23

## 2025-05-23 RX ORDER — FLUTICASONE PROPIONATE 50 MCG
SPRAY, SUSPENSION (ML) NASAL
Qty: 16 G | Refills: 0 | Status: SHIPPED | OUTPATIENT
Start: 2025-05-23

## 2025-05-23 RX ORDER — MONTELUKAST SODIUM 10 MG/1
10 TABLET ORAL NIGHTLY
Qty: 90 TABLET | Refills: 0 | Status: SHIPPED | OUTPATIENT
Start: 2025-05-23

## 2025-05-28 ENCOUNTER — PATIENT OUTREACH (OUTPATIENT)
Dept: PRIMARY CARE | Facility: CLINIC | Age: 50
End: 2025-05-28
Payer: MEDICAID

## 2025-05-28 DIAGNOSIS — I10 BENIGN ESSENTIAL HYPERTENSION: ICD-10-CM

## 2025-05-28 DIAGNOSIS — F33.42 MAJOR DEPRESSIVE DISORDER, RECURRENT, IN FULL REMISSION WITH ANXIOUS DISTRESS: ICD-10-CM

## 2025-05-28 DIAGNOSIS — E66.813 CLASS 3 SEVERE OBESITY DUE TO EXCESS CALORIES WITH SERIOUS COMORBIDITY AND BODY MASS INDEX (BMI) OF 45.0 TO 49.9 IN ADULT: ICD-10-CM

## 2025-05-28 DIAGNOSIS — E11.65 TYPE 2 DIABETES MELLITUS WITH HYPERGLYCEMIA, WITH LONG-TERM CURRENT USE OF INSULIN: ICD-10-CM

## 2025-05-28 DIAGNOSIS — Z79.4 TYPE 2 DIABETES MELLITUS WITH HYPERGLYCEMIA, WITH LONG-TERM CURRENT USE OF INSULIN: ICD-10-CM

## 2025-05-28 NOTE — PROGRESS NOTES
"Care Management Monthly Outreach  Chart review completed  Confirmation of at least 2 patient identifiers  Change in insurance? No    Has patient been to ER/Urgent Care since last outreach? Yes - swollen finger and inc pain. Dc home from ER. Has surgery in early June. Also for abd pain in early may. Dc home with GI follow up    Last Office Visit with PCP: 5/20/2025   Next Office Visit with PCP: 8/20/2025   APC Collaboration: n/a does follow with pharmacy with endocrine    Chronic Conditions and Outreach Summary:   Class 3 severe obesity due to excess calories with serious comorbidity and body mass index (BMI) of 45.0 to 49.9 in adult    Type 2 diabetes mellitus with hyperglycemia, with long-term current use of insulin    Major depressive disorder, recurrent, in full remission with anxious distress    Benign essential hypertension    Left message with Christiana at [facility]   to see if she can give me names of any DME companies to help with the supplies the pt needs, wipes and creams and powders and getting her francisco conditioner fixed, but believe this is a waiver issues with [employer].     Left message with [employer] [facility]   also about bathroom remodel as she stated it is for walk in shower but pt needs It to be handicap accessible all around.  Reviewed she may be close to the cap on her waiver. Await updates.   DM stated doing ok with numbers under 150. Reviewed hypo and hyperglycemia. Pt stated she did have a low in the 60s yesterday. She stated she felt this once she was ready to \"pass out\" but she was able to get some food and blood sugar went up. She verbalized what to do with hypoglycemia.  Reviewed she has upcoming appt with endocrine for patch training. Reviewed this after looking this up and encouraged pt to ask questions about this.   Pt had some appts that she stated she thought were cancelled but I was able to see, she will call to cancel. She stated she is battling a bug, nausea, diarrhea and cough. Had " a slight fever for a few days. This started 5/22. Feels she is getting better. No hyperglycemia. No abd pain. Discussed having smaller more frequent meals. Sputum coming up after using nebulizer. Denied any sob.   HTN pt denied any issues. To have surgery on 6/9 for her finger. Reviewed appts with pt and to follow up after upcoming appts.   Asif: cpap new supplies ordered.            Medications:   Are there medication changes since last visit? No reviewed medications   Refills needed? No    Social Drivers of Health: Addressed in the last 6 months  Care Gaps Addressed? Addressed in the last 6 months  Care Plan addressed: Yes    Upcoming Appointments:   Future Appointments       Date / Time Provider Department Dept Phone    6/11/2025 11:30 AM Anna Walker MD PhD [facility] [phone]    6/12/2025 11:00 AM [facility] [phone]    7/7/2025 1:30 PM Tyler Lux PharmD [facility]  Arrive at: [facility] Home [phone]    7/21/2025 3:15 PM Rob Mederos MD PhD [facility] [phone]    8/20/2025 4:00 PM (Arrive by 3:45 PM) Isidoro Mensah,  [facility] [phone]    9/15/2025 8:00 AM Bernabe Romero, PhD [facility] [phone]    10/21/2025 11:30 AM Marah Felix MD [facility]  Arrive at: [facility] Home [phone]          Blood Pressures Reviewed  BP Readings from Last 3 Encounters:   05/20/25 125/77   04/30/25 130/85   04/29/25 (!) 141/96     Labs Reviewed:  Lab Results   Component Value Date    CREATININE 0.84 04/29/2025    GLUCOSE 159 (H) 04/29/2025    ALKPHOS 76 04/29/2025    K 3.7 04/29/2025    PROT 7.4 04/29/2025     04/29/2025    CALCIUM 8.6 04/29/2025    AST 12 04/29/2025    ALT 12 04/29/2025    BUN 20 04/29/2025    MG 2.18 03/09/2025    PHOS 3.2 01/31/2025    LDH 95 07/17/2020    GFRF 89 08/06/2023    GFRMALE CANCELED 04/30/2023     Lab Results   Component Value  Date    TRIG 385 (H) 03/04/2025    CHOL 226 (H) 03/04/2025    LDLCALC 111 (H) 03/04/2025    HDL 60 03/04/2025     Lab Results   Component Value Date    HGBA1C 8.7 (H) 03/04/2025    HGBA1C 7.2 (H) 09/20/2024    HGBA1C 7.9 (H) 04/18/2024     Lab Results   Component Value Date    WBC 5.0 04/29/2025    RBC 4.23 04/29/2025    HGB 11.6 (L) 04/29/2025     04/29/2025   No other concerns at this time.  Agreeable to continue monthly outreaches.  Encouraged to call if questions or concerns arise.    Zari Jaquez RN

## 2025-06-09 ENCOUNTER — APPOINTMENT (OUTPATIENT)
Dept: PAIN MEDICINE | Facility: HOSPITAL | Age: 50
End: 2025-06-09
Payer: MEDICAID

## 2025-06-09 ENCOUNTER — PATIENT MESSAGE (OUTPATIENT)
Dept: ENDOCRINOLOGY | Facility: CLINIC | Age: 50
End: 2025-06-09

## 2025-06-09 DIAGNOSIS — E04.1 THYROID NODULE: ICD-10-CM

## 2025-06-09 DIAGNOSIS — G61.9 INFLAMMATORY NEUROPATHY (MULTI): ICD-10-CM

## 2025-06-09 DIAGNOSIS — J30.89 PERENNIAL ALLERGIC RHINITIS WITH SEASONAL VARIATION: ICD-10-CM

## 2025-06-09 DIAGNOSIS — J30.2 PERENNIAL ALLERGIC RHINITIS WITH SEASONAL VARIATION: ICD-10-CM

## 2025-06-09 PROCEDURE — RXMED WILLOW AMBULATORY MEDICATION CHARGE

## 2025-06-09 RX ORDER — TIZANIDINE 2 MG/1
2 TABLET ORAL 2 TIMES DAILY
Qty: 30 TABLET | Refills: 0 | Status: SHIPPED | OUTPATIENT
Start: 2025-06-09

## 2025-06-09 RX ORDER — FLUTICASONE PROPIONATE 50 MCG
2 SPRAY, SUSPENSION (ML) NASAL DAILY
Qty: 16 G | Refills: 3 | Status: SHIPPED | OUTPATIENT
Start: 2025-06-09

## 2025-06-09 RX ORDER — LEVOTHYROXINE SODIUM 50 UG/1
50 TABLET ORAL DAILY
Qty: 135 TABLET | Refills: 1 | Status: SHIPPED | OUTPATIENT
Start: 2025-06-09 | End: 2025-06-12 | Stop reason: SDUPTHER

## 2025-06-09 RX ORDER — AZELASTINE 1 MG/ML
1 SPRAY, METERED NASAL 2 TIMES DAILY
Qty: 30 ML | Refills: 3 | Status: SHIPPED | OUTPATIENT
Start: 2025-06-09

## 2025-06-09 NOTE — TELEPHONE ENCOUNTER
Jonathan Ayala to P Do [id number] (supporting Isidoro Mensah DO) (Selected Message)        6/9/25  4:26 PM  I forgot to mention one of the nasal sprays. I also need azelastine nasal solution one percent     I take one spray each nostril twice a day. Thank you.  Jonathan Ayala to P Do [id number] (supporting Isidoro Mensah DO)        6/9/25  4:19 PM  Refills needed. Please send to [facility] in [place], [place].    Tizanidine 2mg every eight hours. Flonase one spray each nostril twice a day.  Thank you. Jonathan Ayala.

## 2025-06-10 ENCOUNTER — PHARMACY VISIT (OUTPATIENT)
Dept: PHARMACY | Facility: CLINIC | Age: 50
End: 2025-06-10
Payer: MEDICAID

## 2025-06-11 ENCOUNTER — APPOINTMENT (OUTPATIENT)
Dept: PAIN MEDICINE | Facility: HOSPITAL | Age: 50
End: 2025-06-11
Payer: MEDICAID

## 2025-06-12 ENCOUNTER — SPECIALTY PHARMACY (OUTPATIENT)
Dept: PHARMACY | Facility: CLINIC | Age: 50
End: 2025-06-12

## 2025-06-12 ENCOUNTER — APPOINTMENT (OUTPATIENT)
Dept: ENDOCRINOLOGY | Facility: CLINIC | Age: 50
End: 2025-06-12
Payer: MEDICAID

## 2025-06-12 ENCOUNTER — OFFICE VISIT (OUTPATIENT)
Dept: OPHTHALMOLOGY | Facility: CLINIC | Age: 50
End: 2025-06-12
Payer: MEDICAID

## 2025-06-12 DIAGNOSIS — G93.2 IIH (IDIOPATHIC INTRACRANIAL HYPERTENSION): ICD-10-CM

## 2025-06-12 DIAGNOSIS — H47.013 NAION (NON-ARTERITIC ANTERIOR ISCHEMIC OPTIC NEUROPATHY), BOTH EYES: Primary | ICD-10-CM

## 2025-06-12 DIAGNOSIS — E04.1 THYROID NODULE: ICD-10-CM

## 2025-06-12 PROCEDURE — 99214 OFFICE O/P EST MOD 30 MIN: CPT | Performed by: PSYCHIATRY & NEUROLOGY

## 2025-06-12 PROCEDURE — 92133 CPTRZD OPH DX IMG PST SGM ON: CPT | Performed by: PSYCHIATRY & NEUROLOGY

## 2025-06-12 RX ORDER — OXYCODONE HYDROCHLORIDE 5 MG/1
TABLET ORAL
COMMUNITY
Start: 2025-06-09

## 2025-06-12 RX ORDER — KETOROLAC TROMETHAMINE 10 MG/1
10 TABLET, FILM COATED ORAL 4 TIMES DAILY
COMMUNITY
Start: 2025-06-09

## 2025-06-12 RX ORDER — LEVOTHYROXINE SODIUM 50 UG/1
TABLET ORAL
Qty: 135 TABLET | Refills: 1 | Status: SHIPPED | OUTPATIENT
Start: 2025-06-12

## 2025-06-12 RX ORDER — MELOXICAM 15 MG/1
1 TABLET ORAL
COMMUNITY
Start: 2025-05-22

## 2025-06-12 ASSESSMENT — ENCOUNTER SYMPTOMS
CARDIOVASCULAR NEGATIVE: 0
HEMATOLOGIC/LYMPHATIC NEGATIVE: 0
EYES NEGATIVE: 1
MUSCULOSKELETAL NEGATIVE: 0
ALLERGIC/IMMUNOLOGIC NEGATIVE: 1
CONSTITUTIONAL NEGATIVE: 0
ENDOCRINE NEGATIVE: 1
PSYCHIATRIC NEGATIVE: 0
RESPIRATORY NEGATIVE: 0
NEUROLOGICAL NEGATIVE: 0
GASTROINTESTINAL NEGATIVE: 0

## 2025-06-12 ASSESSMENT — SLIT LAMP EXAM - LIDS
COMMENTS: NORMAL
COMMENTS: NORMAL

## 2025-06-12 ASSESSMENT — TONOMETRY
OS_IOP_MMHG: 16
IOP_METHOD: TONOPEN
OD_IOP_MMHG: 15

## 2025-06-12 ASSESSMENT — CONF VISUAL FIELD
OS_INFERIOR_TEMPORAL_RESTRICTION: 1
OS_SUPERIOR_NASAL_RESTRICTION: 1
OD_INFERIOR_NASAL_RESTRICTION: 1
OD_INFERIOR_TEMPORAL_RESTRICTION: 1
OS_INFERIOR_NASAL_RESTRICTION: 1
OD_SUPERIOR_TEMPORAL_RESTRICTION: 3
OD_SUPERIOR_NASAL_RESTRICTION: 1
OS_SUPERIOR_TEMPORAL_RESTRICTION: 3

## 2025-06-12 ASSESSMENT — VISUAL ACUITY
OS_SC: HM
METHOD: SNELLEN - LINEAR
OD_SC: HM

## 2025-06-12 ASSESSMENT — CUP TO DISC RATIO
OS_RATIO: 0.15
OD_RATIO: 0.15

## 2025-06-12 ASSESSMENT — EXTERNAL EXAM - LEFT EYE: OS_EXAM: NORMAL

## 2025-06-12 ASSESSMENT — EXTERNAL EXAM - RIGHT EYE: OD_EXAM: NORMAL

## 2025-06-12 NOTE — PROGRESS NOTES
"Assessment and Plan    06/08/2021 +OKN response OD  09/30/2019 +OKN response OD    04/21/2025 CT head without contrast, which I personally reviewed, shows right frontal approach ventriculoperitoneal shunt catheter and by report \"2. Very small, bilateral hygromas are suspected. These measure on the order of at most 3 mm.\"    03/27/2025 CT head without contrast, which I personally reviewed previously, shows the right frontal approach ventriculoperitoneal shunt catheter.  03/08/2025 CT head without contrast, which I personally reviewed previously, shows the right frontal approach ventriculoperitoneal shunt catheter.  02/17/2025 MRI brain with contrast, which I personally reviewed previously, shows the right frontal approach ventriculoperitoneal shunt catheter with gliotic changes along the tract and pituitary flattening. Radiology also reported \"Postsurgical changes with stable positioning of right frontal approach ventricular catheter and findings suggestive of patency of the catheter. However, CSF flow/cine images demonstrate no definite flow within the shunt catheter. Stable size and configuration of the ventricular system when compared to prior exam from 02/17/2025 with no evidence of hydrocephalus.\"  02/15/2025 CT head without contrast, which I personally reviewed previously, shows right frontal approach ventriculoperitoneal shunt catheter.  Interval head imaging.  12/09/2024 CT head without contrast, which I personally reviewed previously, shows the right frontal ventriculoperitoneal shunt catheter.  11/15/2024 CT head without contrast, which I personally reviewed previously, shows the right frontal ventriculoperitoneal shunt catheter.  Interval head imaging.  10/29/2024 CT head without contrast, which I personally reviewed previously, shows the right frontal ventriculoperitoneal shunt catheter.  10/23/2024 MRI brain & orbits with contrast, which I personally reviewed previously, shows the right frontal " "ventriculoperitoneal shunt catheter.  10/23/2024 CT head without contrast, which I personally reviewed previously, shows the right frontal ventriculoperitoneal shunt catheter.  09/24/2024 CT head without contrast, which I personally reviewed previously, shows interval placement of a right frontal approach ventriculoperitoneal shunt.  09/23/2024 CT head without contrast, which I personally reviewed previously, shows no lesion.  09/21/2024 MRI brain without contrast, which I personally reviewed previously, shows stable findings.  09/21/2024 CT head without contrast & CTA head & neck, which I personally reviewed previously, shows the right frontal encephalomalacia.  09/17/2024 MRI brain & orbits with contrast & MRV head, which I personally reviewed previously, show stable right frontal encephalomalacia.  09/02/2024 CT head without contrast, which I personally reviewed previously, shows right frontal encephalomalacia stable from prior.  08/28/2024 CTA head and neck & CT head without contrast, which I personally reviewed previously, show right frontal encephalomalacia stable from prior.  08/06/2024 CTA head & neck & CT head without contrast, which I personally reviewed previously, show right frontal encephalomalacia stable from prior.  07/09/2024 CT head without contrast, which I personally reviewed previously, shows stable right frontal encephalomalacia.  06/05/2024 MRI orbits with contrast, which I personally reviewed previously, shows right frontal encephalomalacia similar to prior imaging.  11/07/2023 MRI brain without contrast, by report from [facility], shows \"No acute intracranial abnormality including no evidence of an acute or recent brain parenchymal infarct. \"  11/07/2023 CTA head & neck & CT head without contrast, by report from [facility], show \"No acute abnormality of the cervical and intracranial vasculature.\" And \"No evidence of an acute intracranial process.  Focal RIGHT frontal lobe " "encephalomalacia deep to a sherley hole, new from 02/25/2020. \"  08/01/2023 MRV head, which I personally reviewed previously, shows no lesion.  07/29/2023 MRI brain & orbits with contrast, which I personally reviewed previously, shows right frontal encephalomalacia consistent with prior bolt.  Interval head imaging.  10/05/2022 CT head without contrast & CTA head & neck, by report from [place], show \" 1.   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  Overlying right-sided sherley hole.)  2.  Prominence of the ventricles and sulci for age.  \" and \"1. Similar appearing old areas of infarction involving the right and left frontal lobes extending to the convexities with underlying encephalomalacia at site of prior hemorrhage from 02/13/2022. Overlying right-sided sherley hole. Prominence of the ventricles and sulci for age. No evidence of acute infarction. No active hemorrhage. 2. No significant stenosis internal carotid arteries. 3. No significant stenosis of the intracranial vessels or evidence of aneurysm. 4. Aberrant right subclavian artery behind the esophagus with no dilation of the proximal esophagus.\"  09/25/2022 CT head without contrast, which I personally reviewed previously, shows no lesion.  04/20/2022 CT head without contrast, which I personally reviewed previously, shows right frontal encephalomalacia judged stable by Radiology.  Interval head imaging.  09/10/2021 MRI brain with contrast, which I personally reviewed previously, shows no lesion.  09/09/2021 CTA head & neck, which I personally reviewed previously, shows no lesion.  09/09/2021 CT head without contrast, which I personally reviewed previously, shows no lesion.  08/11/2021 MRI brain & orbits with contrast & MRV head, which I personally reviewed previously, show left optic atrophy with Radiology " noting “Punctate focus of enhancement seen along the left 7th-8th cranial nerve bundle at the level of the porus acusticus, indeterminate. Otherwise unremarkable MRI of the brain.”  Interval head imaging.  06/04/2021 MRI brain & orbits with contrast, which I personally reviewed previously, shows left optic atrophy.  Interval head imaging.  06/29/2017 MRI brain without contrast, which I personally reviewed previously, shows no lesion.  11/22/2007 CT head without contrast, which I personally reviewed previously, shows no lesion.    02/16/2025 lumbar puncture opening pressure 27 cm water, tube 1: RBC 06083, WBC 10, tube 4: RBC 61353 & WBC 17, protein 143 & glucose 87.  Interval cerebrospinal fluid (CSF) studies.  11/15/2024 RBC 3, WBC 1, protein 67 & glucose 92.  11/07/2024 , WBC 6, protein 69 & glucose 107.  09/18/2024 lumbar puncture tube 1: RBC 1000, WBC 6, tube 4:  & WBC 5, protein 79 & glucose 154. IgG studies with high albumin and albumin index with high cerebrospinal fluid (CSF) IgG/albumin ratio. Oligoclonal bands negative. Cytology negative. Flow cytometry negative. Encephalopathy autoimmune evaluation negative. Meningitis pathogen panel, HSV PCR, VZV PCR, EBV PCR, toxoplasma PCR, West Nile IgG & IgM, VDRL, fungal smear negative.  08/31/2023 lumbar puncture opening pressure 52 cm water, tube 1: , WBC 0, tube 4: RBC 6 & WBC 0, protein 91 & glucose 71.  08/11/2021 lumbar puncture opening pressure 18 cm water, tube 1: RBC 0, WBC 16 (86% L, 13% M), tube 4: RBC 0 & WBC 16 (92% L, 8% M), protein 71 & glucose 61. Cytology negative. Flow cytometry negative. Oligoclonal bands 0. IgG studies with high CSF IgG & albumin.  08/05/2020 lumbar puncture opening pressure 20 cm water, protein 68, glucose 85. MBP wnl. Oligoclonal bands POSITIVE 4.  12/26/2019 lumbar puncture no opening pressure checked per patient) tube ?: RBC 39, WBC 6 (91% L, 9% M), tube ?: RBC 38, WBC 5, protein 89, glucose 79. (via  [facility] from [place])  11/13/2019 lumbar puncture opening pressure 22 cm water, tube 1: RBC 9, WBC 4, tube 4: RBC 1 & WBC 5, protein 82 & glucose 61. Oligoclonal bands 0. IgG studies with non-specific abnormalities. HSV PCR negative.  09/20/2019 lumbar puncture opening pressure 20 cm water, RBC 1, WBC 7 (96% L, 4% M), protein 94 & glucose 78.  01/31/2015 lumbar puncture RBC 2, WBC 0, protein 104 & glucose 74.    08/18/2024 Electrodiagnostic testing.  ffERG: Normal (OU).  Responses of L-/M-cones and S-cones: No abnormality can be detected (OU).  Responses of On- and Off-bipolar cells in cone pathway: Normal (OU).  PhNR (RGC function):  OD: Amplitude reduces mildly and implicit time prolongs mildly.  OS: Amplitude reduces moderately and implicit time prolongs mildly.  mfERG: No abnormality can be found (OU).  PVEP:  OD: Normal.  OS: Amplitude reduces severely.  [place] PVEP:  OD: Left-field PVEP amplitude is significantly lower than right-field PVEP.  OS: PVEP of both sides show no detectable components.  07/27/2019 multifocal ERG with mildly reduced central responses.  Pattern VEP with OD borderline latency at 0.25 degrees and OS with severely prolonged latencies and decreased amplitudes.    Lab Results   Component Value Date/Time    SEDRATE 31 (H) 02/15/2025 2014    SEDRATE 24 (H) 01/28/2025 1108    SEDRATE 35 (H) 01/22/2025 2357    SEDRATE 52 (H) 01/08/2025 1707    SEDRATE 40 (H) 12/09/2024 1615    SEDRATE 21 (H) 11/15/2024 0552    SEDRATE 36 (H) 11/06/2024 2258    SEDRATE 19 08/01/2023 1558    SEDRATE 33 (H) 08/07/2022 0322    SEDRATE 19 05/25/2022 1501    SEDRATE 3 06/05/2020 1110    SEDRATE 6 05/13/2020 1529    SEDRATE 3 03/28/2020 0629    SEDRATE 5 09/16/2019 0507    SEDRATE 8 08/19/2019 1314    CRP 0.82 03/27/2025 0258    CRP 0.16 02/15/2025 2014    CRP 0.34 01/28/2025 1108    CRP 0.43 01/22/2025 2357    CRP 0.54 01/08/2025 1707    CRP 1.00 (H) 12/09/2024 1615    CRP 1.19 (H) 11/15/2024 0552    CRP 1.36 (H)  11/06/2024 2258    CRP 0.90 08/07/2022 0322    CRP 0.35 05/25/2022 1501    CRP 0.34 09/23/2021 0618    CRP 0.71 09/21/2021 0648    CRP 0.14 07/16/2020 0944    CRP 0.10 06/05/2020 1110    CRP <0.10 05/13/2020 1529    CRP <0.10 03/28/2020 0629    CRP 3.64 (A) 11/21/2019 2157    CRP <0.10 08/19/2019 1314      Lab Results   Component Value Date/Time    ZMTMTCNY00 328 03/04/2025 0920    UDAEHECI70 383 09/17/2024 0242    GWBETFBC31 299 02/23/2023 0941    PXACNJOG85 709 02/09/2021 0451    QQROQFLS85 508 12/10/2019 1349    FOLATE 20.8 03/04/2025 0920    FOLATE >24.0 09/17/2024 0242    RCFOL 951 12/10/2019 1349    DGDJ3JE 168 03/04/2025 0920    ILOU9TF 142 09/17/2024 0250    VTAI Normal 09/17/2024 0242    VITAMINA 105 (H) 03/04/2025 0920    VITAMINA 0.53 09/17/2024 0242    VTAR 0.02 09/17/2024 0242      Lab Results   Component Value Date/Time    NMOAQP4 Negative 09/17/2024 0242    NMOAQP4 Negative 11/14/2019 1447    MOGFACS Negative 09/17/2024 0242    MOGFACS Negative 11/10/2020 1000    MOGFACS Negative 11/14/2019 1447    ACE 22 03/04/2025 0920      Tuberculosis studies  Lab Results   Component Value Date/Time    TBSIN Invalid 03/04/2025 0920    TBSIN Negative 09/17/2024 0250    TBSIN Negative 09/22/2021 0430    TBSIN Negative 11/10/2020 1000    TBSIN Negative 11/14/2019 0531      Lab Results   Component Value Date/Time    YNSSJDIK99 328 03/04/2025 0920    WRVGAXVJ42 383 09/17/2024 0242    DQCPBGSY41 299 02/23/2023 0941    USMWRTWL82 709 02/09/2021 0451    ZWHQXKCT58 508 12/10/2019 1349    FOLATE 20.8 03/04/2025 0920    FOLATE >24.0 09/17/2024 0242    RCFOL 951 12/10/2019 1349    GKSR7QR 168 03/04/2025 0920    WASA5VH 142 09/17/2024 0250    VTAI Normal 09/17/2024 0242    VITAMINA 105 (H) 03/04/2025 0920    VITAMINA 0.53 09/17/2024 0242    VTAR 0.02 09/17/2024 0242      09/17/2024 syphilis REACTIVE. Treponema confirm & RPR non-reactive (NR). T-SPOT TB negative. Bartonella abs, Lyme PCR, RMSF abs, Toxoplasma IgG & IgM  negative.  12/04/2023 ESR 10. CRP <0.3 mg/dL.  10/01/2023 syphilis non-reactive (NR).  09/18/2020 ESR 1. CRP < 0.08 mg/dL (0.8 mg/L).  08/28/2020 HbA1c HIGH 7.0. Lipid panel with LDL 64.  08/05/2020 B12 462. Folate wnl. AQP4 ab negative.  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 Suarez.  07/09/2019 BRETT > 1:640. Anti-centromere HIGH 101 (0-40). scl-70 negative. Chromatin ab IgG, anti-ribosomal ab, anti-Sarahy ab, anti-DNA ab negative.    06/12/2025 OCT RNFL OD 45 & OS 38 (stable).   04/04/2025 OCT RNFL OD 41 & OS 38. (Stable to decrease)  02/20/2025 OCT RNFL OD 46 & OS 41. (Stable)  12/16/2024 OCT RNFL OD 48 & OS 38.  11/05/2024 OCT RNFL OD 81 with T & I thinning & OS 37. (Lower OD & stable OS)  10/23/2024 OCT RNFL  & OS 41. (Lower OD & stable OS)  10/03/2024 OCT RNFL  & OS 39. (Lower OD & stable OS)  09/16/2024 OCT RNFL  & OS 36. (Interval new elevation OD & stable thinning OS)  07/25/2024 OCT RNFL OD 89 & OS 37. (Decrease, now at baseline of early 2024)  06/26/2024 OCT RNFL OD 95 & OS 41. (Stable)  06/05/2024 OCT RNFL OD 95 & OS 40. (Stable)  OCT macula OD normal foveal contour 264 & OS normal foveal contour 234.  03/15/2024 OCT RNFL OD 92 & OS 36. (Stable)  01/09/2024 OCT RNFL OD 89 & OS 36. (Stable)  02/06/2023 OCT RNFL OD 92 & OS 38. (stable)  OCT macula OD normal foveal contour 255 & OS thinning RNFL miscentered wrt fovea.  10/07/2022 OCT RNFL OD 92 & OS 40. (decreased OD & stable OS)  09/23/2022 OCT RNFL OD 98 & OS 38. (increased OD & stable OS)  01/27/2022 OCT RNFL OD 88 & OS 37. (stable)  10/06/2021 OCT RNFL OD 93 & OS 39 (stable)..  08/19/2021 OCT RNFL OD 92 & OS 38. (stable)  06/08/2021 OCT RNFL OD 87 & OS 37. (stable)  01/28/2021 OCT RNFL OD 85 & OS 37. (stable)  11/06/2020 OCT RNFL OD 89 & OS 39. (stable)  07/27/2020 OCT RNFL OD 89 & OS 38. (stable)  01/07/2020 OCT RNFL OD 93 & OS 38. (stable to mild increase OD, stable to mild decrease OS)  11/27/2019 OCT RNFL  OD 84 & OS 42. (stable to mild OD decrease)  09/30/2019 OCT RNFL OD 89 & OS 41. (stable)  07/18/2019 OCT RNFL OD 90 & OS 39.  OCT macula OD normal foveal contour 256 & OS thinning of RNFL & GCL, but preserved foveal contour 238.    04/04/2025 HVF 24-2 OD stimulus V, fovea <0, generalized depression with superior temporal relative sparing & OS stimulus V, fovea <0, generalized depression.  12/16/2024 HVF 24-2 OD stimulus V, fovea <0, FL 7/12, FL 0/6, FN 1/5, generalized depression with some relative superior temporal sparing & OS stimulus V, fovea 9, FL 0/16, FP 0/10, FN 6/9, generalized depression.  11/05/2024 HVF 24-2 OD stimulus V, fovea <0, inferior altitudinal & superior arcuate (some superior temporal sparing) & OS stimulus V, fovea 22, central, superior nasal step & inferior arcuate defects.  10/03/2024 HVF 24-2 OD fovea 4, generalized depression MD -29.75 & OS stimulus V, fovea 12, central, superior nasal step & inferior arcuate defects.  09/16/2024 HVF 24-2 OD stimulus III, fovea 14, generalized depression MD -31.43 & OS stimulus V, fovea 1, generalized depression with some superior temporal sparing.  07/25/2024 HVF 24-2 OD stimulus V, fovea 39, superior > inferior nasal step & OS stimulus V, fovea 18, generalized depression with some superior temporal sparing (inferior > superior altitudinal.  06/26/2024 HVF 24-2 OD fovea 33, FL 3/15, FP 0%, FN 20%, superior arcuate MD -13.69 & OS stimulus V, fovea 27, generalized depression.  06/05/2024 HVF 24-2 OD fovea 34, FL 2/16, FP 0%, FN 40%, superior altitudinal MD -18.00 & OS stimulus V, fovea 25, generalized depression.  03/15/2024 HVF 24-2 OD fovea 36, wnl MD -1.05 & OS stimulus V, fovea 3, inferior arcuate > superior arcuate.  01/09/2024 HVF 24-2 OD wnl MD -0.94 & OS generalized depression MD -32.10.  02/06/2023 HVF 24-2 OD fovea 36, FL 1/17, FP 0%< FN 15%, scatter MD -7.58 & OS stimulus V, fovea 19, generalized depression.  10/07/2022 HVF 24-2 OD fovea 35, wnl  MD -1.26 & OS fovea 5, generalized depression MD -30.62.  09/23/2022 HVF 24-2 OD fovea 38, scatter MD -2.67 & OS stimulus V, fovea 16, superior arcuate & inferior arcuate.  01/27/2022 HVF 24-2 OD fovea 36, wnl MD -1.67 & OS stimulus V, fovea 28, generalized reduction.  10/06/2021 HVF 24-2 OD fovea 36, scatter MD -4.76 & OS stimulus V, generalized reduction.  08/19/2021 HVF 24-2 OD fovea 39, wnl MD -2.31 & OS stimulus V, fovea 28, generalized depression.  06/08/2021 HVF Stimulus V OD fovea 34, wnl & OS stimulus V, fovea 24, generalized depression.  01/28/2021 HVF 24-2 OD fovea 40, wnl MD -0.63 & OS stimulus V, fovea 28, superior nasal step & inferior arcuate.  11/06/2020 HVF 24-2 OD fovea 32, possible inferior > superior arcuate MD -14.71 & OS stimulus V, fovea 10, generalized depression.  07/27/2020 HVF 24-2 OD fovea 39, wnl MD -2.45 & OS stimulus V, fovea 27, superior & inferior altitudinal.    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    Polyopoia appears monocular right eye. I suspect dry eye or a tear film problem. I see no misalignment to suggest binocular diplopia. I recommend a trial of artificial tears. If persistent, she could have further cornea evaluation for higher order astigmatic changes.    Otherwise, she has stable near vision with limited peripheral vision and continued optic atrophy. Stable decrease in vision and stable funduscopic examination remain consistent with residual loss I attribute to non-arteritic anterior ischemic optic neuropathy and post-papilledema effects of idiopathic intracranial  hypertension.    Plan    Shunt care with Dr. Tafoya.  Continue furosemide 20 mg PO BID.  Vasculopathic risk factor control.  Low vision services.  Check subjectively in about 10 days for whether continued polyopia and consider cornea evaluation.    Follow up in about 3 months with near vision including pinhole, Sharpe visual field (HVF) & OCT. (dilated 6/5/2024)

## 2025-06-12 NOTE — TELEPHONE ENCOUNTER
Kristi Peterson called for a new script for levothyroxine. Conflicting directions. New script sent to provider for approval

## 2025-06-16 ENCOUNTER — PATIENT OUTREACH (OUTPATIENT)
Dept: PRIMARY CARE | Facility: CLINIC | Age: 50
End: 2025-06-16
Payer: MEDICAID

## 2025-06-16 DIAGNOSIS — I10 BENIGN ESSENTIAL HYPERTENSION: ICD-10-CM

## 2025-06-16 DIAGNOSIS — J18.9 PNEUMONIA DUE TO INFECTIOUS ORGANISM, UNSPECIFIED LATERALITY, UNSPECIFIED PART OF LUNG: ICD-10-CM

## 2025-06-16 DIAGNOSIS — E11.65 TYPE 2 DIABETES MELLITUS WITH HYPERGLYCEMIA, WITH LONG-TERM CURRENT USE OF INSULIN: ICD-10-CM

## 2025-06-16 DIAGNOSIS — Z79.4 TYPE 2 DIABETES MELLITUS WITH HYPERGLYCEMIA, WITH LONG-TERM CURRENT USE OF INSULIN: ICD-10-CM

## 2025-06-16 DIAGNOSIS — R06.02 SOB (SHORTNESS OF BREATH): ICD-10-CM

## 2025-06-16 NOTE — PROGRESS NOTES
Spoke to Ms. Ayala after her ED visit over the weekend. She says that she is ok. She is aware of her ECHO today and will schedule a FUV with her PCP. She will also check with her cardiologist for a Fuv. She has just received her notification to  her moxifloxacin. No questions or concerns at this time.

## 2025-06-18 ENCOUNTER — TELEMEDICINE (OUTPATIENT)
Dept: PRIMARY CARE | Facility: CLINIC | Age: 50
End: 2025-06-18
Payer: MEDICAID

## 2025-06-18 VITALS
SYSTOLIC BLOOD PRESSURE: 152 MMHG | HEART RATE: 90 BPM | BODY MASS INDEX: 48.07 KG/M2 | DIASTOLIC BLOOD PRESSURE: 68 MMHG | HEIGHT: 62 IN

## 2025-06-18 RX ORDER — MOXIFLOXACIN HYDROCHLORIDE 400 MG/1
1 TABLET ORAL
COMMUNITY
Start: 2025-06-16

## 2025-06-18 ASSESSMENT — PATIENT HEALTH QUESTIONNAIRE - PHQ9: 1. LITTLE INTEREST OR PLEASURE IN DOING THINGS: NOT AT ALL

## 2025-06-18 NOTE — PROGRESS NOTES
"Subjective   Patient ID: Jonathan Ayala is a 49 y.o. female who presents for Follow-up (Emergency room, SOB and swollen legs. Kidney levels has decreased ).    HPI     Review of Systems    Objective   /68 Comment: patient reported somewhere around these numbers  Pulse 90 Comment: somewhere around this number  Ht 1.575 m (5' 2\")   BMI 48.07 kg/m²     Physical Exam    Assessment/Plan   {Assess/PlanSmartLinks:69825}       "

## 2025-06-23 ENCOUNTER — APPOINTMENT (OUTPATIENT)
Dept: PRIMARY CARE | Facility: CLINIC | Age: 50
End: 2025-06-23
Payer: MEDICAID

## 2025-06-23 VITALS — BODY MASS INDEX: 48.03 KG/M2 | HEIGHT: 62 IN | WEIGHT: 261 LBS

## 2025-06-23 DIAGNOSIS — E27.40 ADRENAL INSUFFICIENCY (MULTI): ICD-10-CM

## 2025-06-23 DIAGNOSIS — Z98.2 VP (VENTRICULOPERITONEAL) SHUNT STATUS: ICD-10-CM

## 2025-06-23 DIAGNOSIS — D83.9 CVID (COMMON VARIABLE IMMUNODEFICIENCY): ICD-10-CM

## 2025-06-23 DIAGNOSIS — G44.40 ANALGESIC REBOUND HEADACHE: ICD-10-CM

## 2025-06-23 DIAGNOSIS — J45.50 SEVERE PERSISTENT ASTHMA WITHOUT COMPLICATION (MULTI): Primary | ICD-10-CM

## 2025-06-23 DIAGNOSIS — T39.95XA ANALGESIC REBOUND HEADACHE: ICD-10-CM

## 2025-06-23 DIAGNOSIS — E11.43 TYPE 2 DIABETES MELLITUS WITH DIABETIC AUTONOMIC NEUROPATHY, WITH LONG-TERM CURRENT USE OF INSULIN: ICD-10-CM

## 2025-06-23 DIAGNOSIS — G93.2 IDIOPATHIC INTRACRANIAL HYPERTENSION: ICD-10-CM

## 2025-06-23 DIAGNOSIS — Z79.4 TYPE 2 DIABETES MELLITUS WITH DIABETIC AUTONOMIC NEUROPATHY, WITH LONG-TERM CURRENT USE OF INSULIN: ICD-10-CM

## 2025-06-23 PROBLEM — J84.9 INTERSTITIAL PULMONARY DISEASE (MULTI): Status: RESOLVED | Noted: 2023-04-19 | Resolved: 2025-06-23

## 2025-06-23 PROBLEM — M34.9 SYSTEMIC SCLEROSIS (MULTI): Status: RESOLVED | Noted: 2018-04-13 | Resolved: 2025-06-23

## 2025-06-23 PROBLEM — R51.9 HEADACHE, UNSPECIFIED: Status: RESOLVED | Noted: 2025-03-09 | Resolved: 2025-06-23

## 2025-06-23 PROBLEM — G61.9 INFLAMMATORY NEUROPATHY (MULTI): Status: RESOLVED | Noted: 2025-05-20 | Resolved: 2025-06-23

## 2025-06-23 PROBLEM — G47.00 INSOMNIA: Status: RESOLVED | Noted: 2020-08-11 | Resolved: 2025-06-23

## 2025-06-23 PROCEDURE — 3008F BODY MASS INDEX DOCD: CPT | Performed by: INTERNAL MEDICINE

## 2025-06-23 PROCEDURE — 1036F TOBACCO NON-USER: CPT | Performed by: INTERNAL MEDICINE

## 2025-06-23 PROCEDURE — 99214 OFFICE O/P EST MOD 30 MIN: CPT | Performed by: INTERNAL MEDICINE

## 2025-06-23 PROCEDURE — 4010F ACE/ARB THERAPY RXD/TAKEN: CPT | Performed by: INTERNAL MEDICINE

## 2025-06-23 RX ORDER — PROPRANOLOL HYDROCHLORIDE 60 MG/1
60 CAPSULE, EXTENDED RELEASE ORAL DAILY
Qty: 30 CAPSULE | Refills: 3 | Status: SHIPPED | OUTPATIENT
Start: 2025-06-23

## 2025-06-23 NOTE — ASSESSMENT & PLAN NOTE
Follows with rheumatology specialist current inflammatory levels stable continues on immunoglobin therapy with Gammagard infusion 60 mg every 14 days

## 2025-06-23 NOTE — PROGRESS NOTES
Subjective   Jonathan Ayala is a 49 y.o. female who presents for Follow-up (Er follow up ).  Virtual or Telephone Consent    An interactive audio and video telecommunication system which permits real time communications between the patient (at the originating site) and provider (at the distant site) was utilized to provide this telehealth service.   Time spent 33 minutes with coordination of care  Verbal consent was requested and obtained from Jonathan Ayala on this date, 06/23/25 for a telehealth visit and the patient's location was confirmed at the time of the visit.  HPI  This is a diabetes follow up visit for Jonathan Ayala. The current treatment includes basal insulin and mealtime insulin and she is compliant all of the time. Symptoms include none. Glucose is monitored: 4 times daily, and average sugars are 150 - 200. she reports fair compliance with a low carbohydrate diet, and does not exercise.    Patient is prescribed an ACE/ARB/ARNI medication   Patient is not prescribed a STATIN medication  Patient is not prescribed a SGLT2/GLP1 medication    Last Flu Vaccine given:            12/16/2023   Last PCV Vaccine given:  10/08/2021     Lab Results   Component Value Date    HGBA1C 8.7 (H) 03/04/2025    CREATININE 0.84 04/29/2025    MICROALBCREA 117 (H) 03/04/2025    LDLCALC 111 (H) 03/04/2025        Last Eye Exam: patient reports annual screening by optometry/opthalmology     Liver Screening: FIB-4 Calculation: 0.65 at 4/29/2025  1:41 AM  Calculated from:  SGOT/AST: 12 U/L at 4/29/2025  1:41 AM  SGPT/ALT: 12 U/L at 4/29/2025  1:41 AM  Platelets: 251 x10*3/uL at 4/29/2025  1:41 AM  Age: 49 years    Interpretation: <1.45 Cirrhosis less likely, 1.45 - 3.25 Indeterminate, >3.25 Cirrhosis more likely    Review of Systems   All other systems reviewed and are negative.    There were no vitals taken for this visit.Body mass index is 47.74 kg/m².   Objective   Physical Exam  Constitutional:       General: She is not in acute  distress.     Appearance: Normal appearance.   HENT:      Head: Normocephalic and atraumatic.   Neurological:      Mental Status: She is alert.         Normal BILATERAL diabetic foot exam: Pulses are palpable, sensation is intact, and there are no ulcers or lesions.     Assessment & Plan  Analgesic rebound headache  Refill propranolol 60 mg 1 capsule daily improvement in chronic headaches with adjustment in  shunt continue  Orders:    propranolol LA (Inderal LA) 60 mg 24 hr capsule; Take 1 capsule (60 mg) by mouth once daily. Do not crush, chew, or split.    Severe persistent asthma without complication (Multi)  Recently evaluated and treated on [facility] emergency department for exacerbation.  Given Levaquin in the setting of CVID and immunosuppression stabilized CT of chest revealed no evidence of pulmonary fibrosis and no evidence of worsening infiltrate or mass continue use of Advair at home along with Pulmicort nebulizer and Brovana nebulizer long-acting due to autoimmune status and respiratory status immunocompromised and risk of subsequent respiratory failure patient meets criteria for home air conditioner prescription written today based on necessity to reduce risk of further ER visits and hospitalization related to severity of humidity        (ventriculoperitoneal) shunt status  Continue to follow with neurosurgery with adjustments with improvements in overall headaches       Idiopathic intracranial hypertension  Significant improvement in headaches and vision changes associated with  shunt follow closely with neurosurgery       Type 2 diabetes mellitus with diabetic autonomic neuropathy, with long-term current use of insulin  Patient using CGM monitor monitoring for episodes of hypoglycemia continues on basal bolus insulin question will be transitioning to insulin pump with assistance from endocrinologist reevaluate lab work with next appointment follow-up       Adrenal insufficiency (Multi)  Continue  hydrocortisone 10 mg twice a day       CVID (common variable immunodeficiency)  Follows with rheumatology specialist current inflammatory levels stable continues on immunoglobin therapy with Gammagard infusion 60 mg every 14 days                 Isidoro Mensah DO 06/23/25 4:55 PM          Patient was identified as a fall risk. Risk prevention instructions provided.

## 2025-06-23 NOTE — ASSESSMENT & PLAN NOTE
Recently evaluated and treated on [facility] emergency department for exacerbation.  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

## 2025-06-23 NOTE — ASSESSMENT & PLAN NOTE
Refill propranolol 60 mg 1 capsule daily improvement in chronic headaches with adjustment in  shunt continue  Orders:    propranolol LA (Inderal LA) 60 mg 24 hr capsule; Take 1 capsule (60 mg) by mouth once daily. Do not crush, chew, or split.

## 2025-06-23 NOTE — PROGRESS NOTES
"Subjective   Patient ID: Jonathan Ayala is a 49 y.o. female who presents for Follow-up (Er follow up ).    HPI     Review of Systems    Objective   Ht 1.575 m (5' 2\")   Wt 118 kg (261 lb)   BMI 47.74 kg/m²     Physical Exam    Assessment/Plan          "

## 2025-06-23 NOTE — ASSESSMENT & PLAN NOTE
Patient using CGM monitor monitoring for episodes of hypoglycemia continues on basal bolus insulin question will be transitioning to insulin pump with assistance from endocrinologist reevaluate lab work with next appointment follow-up

## 2025-06-23 NOTE — PATIENT INSTRUCTIONS

## 2025-06-23 NOTE — ASSESSMENT & PLAN NOTE
Significant improvement in headaches and vision changes associated with  shunt follow closely with neurosurgery

## 2025-06-25 ENCOUNTER — PATIENT OUTREACH (OUTPATIENT)
Dept: PRIMARY CARE | Facility: CLINIC | Age: 50
End: 2025-06-25
Payer: MEDICAID

## 2025-06-25 DIAGNOSIS — E66.813 CLASS 3 SEVERE OBESITY DUE TO EXCESS CALORIES WITH SERIOUS COMORBIDITY AND BODY MASS INDEX (BMI) OF 45.0 TO 49.9 IN ADULT: ICD-10-CM

## 2025-06-25 DIAGNOSIS — Z79.4 TYPE 2 DIABETES MELLITUS WITH DIABETIC AUTONOMIC NEUROPATHY, WITH LONG-TERM CURRENT USE OF INSULIN: ICD-10-CM

## 2025-06-25 DIAGNOSIS — G40.909 SEIZURE DISORDER (MULTI): ICD-10-CM

## 2025-06-25 DIAGNOSIS — I10 BENIGN ESSENTIAL HYPERTENSION: ICD-10-CM

## 2025-06-25 DIAGNOSIS — E11.43 TYPE 2 DIABETES MELLITUS WITH DIABETIC AUTONOMIC NEUROPATHY, WITH LONG-TERM CURRENT USE OF INSULIN: ICD-10-CM

## 2025-06-25 NOTE — PROGRESS NOTES
Care Management Monthly Outreach  Chart review completed  Confirmation of at least 2 patient identifiers  Change in insurance? No    Has patient been to ER/Urgent Care since last outreach? Yes - see 6/21 and 6/15    Last Office Visit with PCP: 5/20/2025   Next Office Visit with PCP: 8/20/2025   APC Collaboration: Pharmacy endo    Chronic Conditions and Outreach Summary:   Type 2 diabetes mellitus with diabetic autonomic neuropathy, with long-term current use of insulin    Benign essential hypertension    Class 3 severe obesity due to excess calories with serious comorbidity and body mass index (BMI) of 45.0 to 49.9 in adult    Seizure disorder (Multi)    FINISH NOTE     Medications:   Are there medication changes since last visit? Yes - double lasix for a week  Refills needed? No    Social Drivers of Health: Addressed in the last 6 months  Care Gaps Addressed? Addressed in the last 6 months  Care Plan addressed: Yes    Upcoming Appointments:   Future Appointments       Date / Time Provider Department Dept Phone    7/10/2025 11:00 AM [facility] [phone]    7/21/2025 10:00 AM Tyler Lux, [facility]  Arrive at: Your Home [phone]    7/21/2025 1:00 PM Anna Walker MD PhD [facility] [phone]    8/20/2025 4:00 PM (Arrive by 3:45 PM) Isidoro Mensah DO [facility] [phone]    9/9/2025 3:15 PM Rob Mederos MD PhD [facility] [phone]    9/15/2025 8:00 AM Bernabe Romero, PhD [facility] [phone]    10/21/2025 11:30 AM Mraah Felix MD [facility]  Arrive at: Your Home [phone]          Blood Pressures Reviewed  BP Readings from Last 3 Encounters:   06/18/25 152/68   05/20/25 125/77   04/30/25 130/85     Labs Reviewed:  Lab Results   Component Value Date    CREATININE 0.84 04/29/2025    GLUCOSE 159 (H) 04/29/2025    ALKPHOS 76 04/29/2025    K 3.7  04/29/2025    PROT 7.4 04/29/2025     04/29/2025    CALCIUM 8.6 04/29/2025    AST 12 04/29/2025    ALT 12 04/29/2025    BUN 20 04/29/2025    MG 2.18 03/09/2025    PHOS 3.2 01/31/2025    LDH 95 07/17/2020    GFRF 89 08/06/2023    GFRMALE CANCELED 04/30/2023     Lab Results   Component Value Date    TRIG 385 (H) 03/04/2025    CHOL 226 (H) 03/04/2025    LDLCALC 111 (H) 03/04/2025    HDL 60 03/04/2025     Lab Results   Component Value Date    HGBA1C 8.7 (H) 03/04/2025    HGBA1C 7.2 (H) 09/20/2024    HGBA1C 7.9 (H) 04/18/2024     Lab Results   Component Value Date    WBC 5.0 04/29/2025    RBC 4.23 04/29/2025    HGB 11.6 (L) 04/29/2025     04/29/2025   No other concerns at this time.  Agreeable to continue monthly outreaches.  Encouraged to call if questions or concerns arise.    Zari Jaquez RN

## 2025-06-26 LAB
ACTH PLAS-MCNC: NORMAL PG/ML
CORTIS SERPL-MCNC: 0.5 MCG/DL
DHEA-S SERPL-MCNC: 4 MCG/DL (ref 15–205)
TSH SERPL-ACNC: 3.19 MIU/L

## 2025-06-27 ENCOUNTER — TELEPHONE (OUTPATIENT)
Dept: ENDOCRINOLOGY | Facility: CLINIC | Age: 50
End: 2025-06-27
Payer: MEDICAID

## 2025-06-27 ENCOUNTER — PATIENT MESSAGE (OUTPATIENT)
Dept: PRIMARY CARE | Facility: CLINIC | Age: 50
End: 2025-06-27
Payer: MEDICAID

## 2025-06-27 DIAGNOSIS — G61.9 INFLAMMATORY NEUROPATHY (MULTI): ICD-10-CM

## 2025-06-27 DIAGNOSIS — G93.2 BENIGN INTRACRANIAL HYPERTENSION: ICD-10-CM

## 2025-06-27 PROCEDURE — RXMED WILLOW AMBULATORY MEDICATION CHARGE

## 2025-06-27 RX ORDER — FUROSEMIDE 20 MG/1
20 TABLET ORAL
Qty: 180 TABLET | Refills: 3 | Status: SHIPPED | OUTPATIENT
Start: 2025-06-27 | End: 2026-06-27

## 2025-06-27 RX ORDER — TIZANIDINE 2 MG/1
2 TABLET ORAL 2 TIMES DAILY
Qty: 180 TABLET | Refills: 3 | Status: SHIPPED | OUTPATIENT
Start: 2025-06-27

## 2025-06-27 NOTE — TELEPHONE ENCOUNTER
Patient left MyChart message stating that she received her lab results and needs for someone to explain them to her because she don't understand the results.

## 2025-06-30 LAB
ACTH PLAS-MCNC: <5 PG/ML (ref 6–50)
CORTIS SERPL-MCNC: 0.5 MCG/DL
DHEA-S SERPL-MCNC: 4 MCG/DL (ref 15–205)
TSH SERPL-ACNC: 3.19 MIU/L

## 2025-07-01 ENCOUNTER — PATIENT MESSAGE (OUTPATIENT)
Dept: ENDOCRINOLOGY | Facility: CLINIC | Age: 50
End: 2025-07-01
Payer: MEDICAID

## 2025-07-01 DIAGNOSIS — Z79.4 TYPE 2 DIABETES MELLITUS WITH HYPERGLYCEMIA, WITH LONG-TERM CURRENT USE OF INSULIN: ICD-10-CM

## 2025-07-01 DIAGNOSIS — E11.65 TYPE 2 DIABETES MELLITUS WITH HYPERGLYCEMIA, WITH LONG-TERM CURRENT USE OF INSULIN: ICD-10-CM

## 2025-07-02 RX ORDER — LANCETS 33 GAUGE
EACH MISCELLANEOUS
Qty: 400 EACH | Refills: 3 | Status: SHIPPED | OUTPATIENT
Start: 2025-07-02

## 2025-07-02 RX ORDER — BLOOD-GLUCOSE METER
EACH MISCELLANEOUS
Qty: 400 STRIP | Refills: 3 | Status: SHIPPED | OUTPATIENT
Start: 2025-07-02

## 2025-07-03 DIAGNOSIS — Z79.4 TYPE 2 DIABETES MELLITUS WITH HYPERGLYCEMIA, WITH LONG-TERM CURRENT USE OF INSULIN: ICD-10-CM

## 2025-07-03 DIAGNOSIS — E11.65 TYPE 2 DIABETES MELLITUS WITH HYPERGLYCEMIA, WITH LONG-TERM CURRENT USE OF INSULIN: ICD-10-CM

## 2025-07-03 RX ORDER — LANCETS
EACH MISCELLANEOUS
Qty: 400 EACH | Refills: 2 | Status: SHIPPED | OUTPATIENT
Start: 2025-07-03

## 2025-07-03 RX ORDER — INSULIN PUMP SYRINGE, 3 ML
EACH MISCELLANEOUS
Qty: 1 EACH | Refills: 0 | Status: SHIPPED | OUTPATIENT
Start: 2025-07-03 | End: 2026-07-03

## 2025-07-03 NOTE — TELEPHONE ENCOUNTER
Pharmacy called stating patient is out of supplies and patients insurance no longer covers one touch products.

## 2025-07-07 ENCOUNTER — APPOINTMENT (OUTPATIENT)
Dept: ENDOCRINOLOGY | Facility: CLINIC | Age: 50
End: 2025-07-07
Payer: MEDICAID

## 2025-07-09 ENCOUNTER — TELEPHONE (OUTPATIENT)
Dept: ENDOCRINOLOGY | Facility: CLINIC | Age: 50
End: 2025-07-09
Payer: MEDICAID

## 2025-07-09 PROCEDURE — RXMED WILLOW AMBULATORY MEDICATION CHARGE

## 2025-07-09 NOTE — TELEPHONE ENCOUNTER
Patient called after canceling appointment to have patch training with diabetic educator. She canceled due to problems with her dexcom g7 reciever and sensor. Patient called dexcom who stated her receivers have been recalled. Patient currently using accu chek meter to monitor blood glucose. Would still like to come for patch training and possibly switch cgm's. Provider notified.

## 2025-07-10 ENCOUNTER — APPOINTMENT (OUTPATIENT)
Dept: ENDOCRINOLOGY | Facility: CLINIC | Age: 50
End: 2025-07-10
Payer: MEDICAID

## 2025-07-10 ENCOUNTER — PHARMACY VISIT (OUTPATIENT)
Dept: PHARMACY | Facility: CLINIC | Age: 50
End: 2025-07-10
Payer: MEDICAID

## 2025-07-17 ENCOUNTER — TELEPHONE (OUTPATIENT)
Dept: ENDOCRINOLOGY | Facility: CLINIC | Age: 50
End: 2025-07-17
Payer: MEDICAID

## 2025-07-17 DIAGNOSIS — Z79.4 TYPE 2 DIABETES MELLITUS WITH HYPERGLYCEMIA, WITH LONG-TERM CURRENT USE OF INSULIN: Primary | ICD-10-CM

## 2025-07-17 DIAGNOSIS — E11.65 TYPE 2 DIABETES MELLITUS WITH HYPERGLYCEMIA, WITH LONG-TERM CURRENT USE OF INSULIN: Primary | ICD-10-CM

## 2025-07-17 RX ORDER — BLOOD-GLUCOSE SENSOR
EACH MISCELLANEOUS
Qty: 1 EACH | Refills: 0 | Status: SHIPPED | OUTPATIENT
Start: 2025-07-17

## 2025-07-17 NOTE — TELEPHONE ENCOUNTER
Last visit 5-12-25  Next visit 10-21-25  Patient called stating that she was told by Louisa that she needs a new script for Dexcom G7.

## 2025-07-18 DIAGNOSIS — G44.40 ANALGESIC REBOUND HEADACHE: ICD-10-CM

## 2025-07-18 DIAGNOSIS — T39.95XA ANALGESIC REBOUND HEADACHE: ICD-10-CM

## 2025-07-18 RX ORDER — PROPRANOLOL HYDROCHLORIDE 60 MG/1
60 CAPSULE, EXTENDED RELEASE ORAL DAILY
Qty: 90 CAPSULE | Refills: 3 | Status: SHIPPED | OUTPATIENT
Start: 2025-07-18

## 2025-07-18 NOTE — TELEPHONE ENCOUNTER
Jonathan COHEN Do [id number] [facility] Clinical Support Staff (supporting Isidoro Mensah DO) (Selected Message)        7/17/25 10:33 PM  I need refill sent for propanolol 60 mg capsule once a day sent to [facility] in [place]. Thanks. Jonathan

## 2025-07-21 ENCOUNTER — APPOINTMENT (OUTPATIENT)
Dept: OPHTHALMOLOGY | Facility: CLINIC | Age: 50
End: 2025-07-21
Payer: MEDICAID

## 2025-07-23 DIAGNOSIS — G47.01 INSOMNIA DUE TO MEDICAL CONDITION: ICD-10-CM

## 2025-07-24 RX ORDER — AMITRIPTYLINE HYDROCHLORIDE 100 MG/1
100 TABLET ORAL NIGHTLY
Qty: 90 TABLET | Refills: 3 | Status: SHIPPED | OUTPATIENT
Start: 2025-07-24

## 2025-07-25 ENCOUNTER — PATIENT MESSAGE (OUTPATIENT)
Dept: PRIMARY CARE | Facility: CLINIC | Age: 50
End: 2025-07-25
Payer: MEDICAID

## 2025-07-25 DIAGNOSIS — I10 BENIGN ESSENTIAL HYPERTENSION: Primary | ICD-10-CM

## 2025-07-25 RX ORDER — LOSARTAN POTASSIUM 25 MG/1
25 TABLET ORAL 2 TIMES DAILY
Qty: 180 TABLET | Refills: 4 | Status: SHIPPED | OUTPATIENT
Start: 2025-07-25 | End: 2026-08-29

## 2025-07-28 ENCOUNTER — HOSPITAL ENCOUNTER (EMERGENCY)
Facility: HOSPITAL | Age: 50
Discharge: HOME | End: 2025-07-29
Attending: STUDENT IN AN ORGANIZED HEALTH CARE EDUCATION/TRAINING PROGRAM
Payer: MEDICAID

## 2025-07-28 DIAGNOSIS — J20.9 ACUTE BRONCHITIS, UNSPECIFIED ORGANISM: Primary | ICD-10-CM

## 2025-07-28 PROCEDURE — 99284 EMERGENCY DEPT VISIT MOD MDM: CPT | Performed by: STUDENT IN AN ORGANIZED HEALTH CARE EDUCATION/TRAINING PROGRAM

## 2025-07-28 ASSESSMENT — LIFESTYLE VARIABLES
HAVE PEOPLE ANNOYED YOU BY CRITICIZING YOUR DRINKING: NO
HAVE YOU EVER FELT YOU SHOULD CUT DOWN ON YOUR DRINKING: NO
TOTAL SCORE: 0
EVER HAD A DRINK FIRST THING IN THE MORNING TO STEADY YOUR NERVES TO GET RID OF A HANGOVER: NO
EVER FELT BAD OR GUILTY ABOUT YOUR DRINKING: NO

## 2025-07-28 ASSESSMENT — PAIN - FUNCTIONAL ASSESSMENT: PAIN_FUNCTIONAL_ASSESSMENT: 0-10

## 2025-07-28 ASSESSMENT — PAIN SCALES - GENERAL: PAINLEVEL_OUTOF10: 0 - NO PAIN

## 2025-07-29 ENCOUNTER — APPOINTMENT (OUTPATIENT)
Dept: RADIOLOGY | Facility: HOSPITAL | Age: 50
End: 2025-07-29
Payer: MEDICAID

## 2025-07-29 VITALS
SYSTOLIC BLOOD PRESSURE: 134 MMHG | WEIGHT: 266 LBS | TEMPERATURE: 98.2 F | RESPIRATION RATE: 20 BRPM | HEART RATE: 85 BPM | DIASTOLIC BLOOD PRESSURE: 73 MMHG | BODY MASS INDEX: 48.95 KG/M2 | HEIGHT: 62 IN | OXYGEN SATURATION: 97 %

## 2025-07-29 LAB
ALBUMIN SERPL BCP-MCNC: 3.9 G/DL (ref 3.4–5)
ALP SERPL-CCNC: 84 U/L (ref 33–110)
ALT SERPL W P-5'-P-CCNC: 30 U/L (ref 7–45)
ANION GAP BLDV CALCULATED.4IONS-SCNC: -2 MMOL/L (ref 10–25)
ANION GAP SERPL CALC-SCNC: 11 MMOL/L (ref 10–20)
AST SERPL W P-5'-P-CCNC: 28 U/L (ref 9–39)
BASE EXCESS BLDV CALC-SCNC: 9.9 MMOL/L (ref -2–3)
BASOPHILS # BLD AUTO: 0.03 X10*3/UL (ref 0–0.1)
BASOPHILS NFR BLD AUTO: 0.6 %
BILIRUB SERPL-MCNC: 0.3 MG/DL (ref 0–1.2)
BNP SERPL-MCNC: 17 PG/ML (ref 0–99)
BODY TEMPERATURE: 37 DEGREES CELSIUS
BUN SERPL-MCNC: 21 MG/DL (ref 6–23)
CA-I BLDV-SCNC: 1.15 MMOL/L (ref 1.1–1.33)
CALCIUM SERPL-MCNC: 9 MG/DL (ref 8.6–10.3)
CARDIAC TROPONIN I PNL SERPL HS: <3 NG/L (ref 0–13)
CARDIAC TROPONIN I PNL SERPL HS: <3 NG/L (ref 0–13)
CHLORIDE BLDV-SCNC: 106 MMOL/L (ref 98–107)
CHLORIDE SERPL-SCNC: 102 MMOL/L (ref 98–107)
CO2 SERPL-SCNC: 29 MMOL/L (ref 21–32)
CREAT SERPL-MCNC: 0.98 MG/DL (ref 0.5–1.05)
EGFRCR SERPLBLD CKD-EPI 2021: 72 ML/MIN/1.73M*2
EOSINOPHIL # BLD AUTO: 0.11 X10*3/UL (ref 0–0.7)
EOSINOPHIL NFR BLD AUTO: 2.1 %
ERYTHROCYTE [DISTWIDTH] IN BLOOD BY AUTOMATED COUNT: 14.6 % (ref 11.5–14.5)
FLUAV RNA RESP QL NAA+PROBE: NOT DETECTED
FLUBV RNA RESP QL NAA+PROBE: NOT DETECTED
GLUCOSE BLDV-MCNC: 147 MG/DL (ref 74–99)
GLUCOSE SERPL-MCNC: 135 MG/DL (ref 74–99)
HCO3 BLDV-SCNC: 34.3 MMOL/L (ref 22–26)
HCT VFR BLD AUTO: 38.6 % (ref 36–46)
HCT VFR BLD EST: 39 % (ref 36–46)
HGB BLD-MCNC: 12.5 G/DL (ref 12–16)
HGB BLDV-MCNC: 13 G/DL (ref 12–16)
IMM GRANULOCYTES # BLD AUTO: 0.01 X10*3/UL (ref 0–0.7)
IMM GRANULOCYTES NFR BLD AUTO: 0.2 % (ref 0–0.9)
INHALED O2 CONCENTRATION: 21 %
LACTATE BLDV-SCNC: 1.1 MMOL/L (ref 0.4–2)
LYMPHOCYTES # BLD AUTO: 1.76 X10*3/UL (ref 1.2–4.8)
LYMPHOCYTES NFR BLD AUTO: 33.4 %
MAGNESIUM SERPL-MCNC: 1.94 MG/DL (ref 1.6–2.4)
MCH RBC QN AUTO: 28.8 PG (ref 26–34)
MCHC RBC AUTO-ENTMCNC: 32.4 G/DL (ref 32–36)
MCV RBC AUTO: 89 FL (ref 80–100)
MONOCYTES # BLD AUTO: 0.37 X10*3/UL (ref 0.1–1)
MONOCYTES NFR BLD AUTO: 7 %
NEUTROPHILS # BLD AUTO: 2.99 X10*3/UL (ref 1.2–7.7)
NEUTROPHILS NFR BLD AUTO: 56.7 %
NRBC BLD-RTO: 0 /100 WBCS (ref 0–0)
OXYHGB MFR BLDV: 79.7 % (ref 45–75)
PCO2 BLDV: 44 MM HG (ref 41–51)
PH BLDV: 7.5 PH (ref 7.33–7.43)
PLATELET # BLD AUTO: 258 X10*3/UL (ref 150–450)
PO2 BLDV: 50 MM HG (ref 35–45)
POTASSIUM BLDV-SCNC: 4 MMOL/L (ref 3.5–5.3)
POTASSIUM SERPL-SCNC: 4.2 MMOL/L (ref 3.5–5.3)
PROT SERPL-MCNC: 7.4 G/DL (ref 6.4–8.2)
RBC # BLD AUTO: 4.34 X10*6/UL (ref 4–5.2)
RSV RNA RESP QL NAA+PROBE: NOT DETECTED
SAO2 % BLDV: 82 % (ref 45–75)
SARS-COV-2 RNA RESP QL NAA+PROBE: NOT DETECTED
SODIUM BLDV-SCNC: 134 MMOL/L (ref 136–145)
SODIUM SERPL-SCNC: 138 MMOL/L (ref 136–145)
WBC # BLD AUTO: 5.3 X10*3/UL (ref 4.4–11.3)

## 2025-07-29 PROCEDURE — 36415 COLL VENOUS BLD VENIPUNCTURE: CPT | Performed by: STUDENT IN AN ORGANIZED HEALTH CARE EDUCATION/TRAINING PROGRAM

## 2025-07-29 PROCEDURE — 71046 X-RAY EXAM CHEST 2 VIEWS: CPT | Performed by: STUDENT IN AN ORGANIZED HEALTH CARE EDUCATION/TRAINING PROGRAM

## 2025-07-29 PROCEDURE — 84484 ASSAY OF TROPONIN QUANT: CPT | Performed by: STUDENT IN AN ORGANIZED HEALTH CARE EDUCATION/TRAINING PROGRAM

## 2025-07-29 PROCEDURE — 84132 ASSAY OF SERUM POTASSIUM: CPT | Mod: 59 | Performed by: STUDENT IN AN ORGANIZED HEALTH CARE EDUCATION/TRAINING PROGRAM

## 2025-07-29 PROCEDURE — 87637 SARSCOV2&INF A&B&RSV AMP PRB: CPT | Performed by: STUDENT IN AN ORGANIZED HEALTH CARE EDUCATION/TRAINING PROGRAM

## 2025-07-29 PROCEDURE — 83735 ASSAY OF MAGNESIUM: CPT | Performed by: STUDENT IN AN ORGANIZED HEALTH CARE EDUCATION/TRAINING PROGRAM

## 2025-07-29 PROCEDURE — 2500000004 HC RX 250 GENERAL PHARMACY W/ HCPCS (ALT 636 FOR OP/ED): Mod: JW | Performed by: STUDENT IN AN ORGANIZED HEALTH CARE EDUCATION/TRAINING PROGRAM

## 2025-07-29 PROCEDURE — 96374 THER/PROPH/DIAG INJ IV PUSH: CPT

## 2025-07-29 PROCEDURE — 85025 COMPLETE CBC W/AUTO DIFF WBC: CPT | Performed by: STUDENT IN AN ORGANIZED HEALTH CARE EDUCATION/TRAINING PROGRAM

## 2025-07-29 PROCEDURE — 83880 ASSAY OF NATRIURETIC PEPTIDE: CPT | Performed by: STUDENT IN AN ORGANIZED HEALTH CARE EDUCATION/TRAINING PROGRAM

## 2025-07-29 PROCEDURE — 96375 TX/PRO/DX INJ NEW DRUG ADDON: CPT

## 2025-07-29 PROCEDURE — 84132 ASSAY OF SERUM POTASSIUM: CPT | Performed by: STUDENT IN AN ORGANIZED HEALTH CARE EDUCATION/TRAINING PROGRAM

## 2025-07-29 PROCEDURE — 71046 X-RAY EXAM CHEST 2 VIEWS: CPT

## 2025-07-29 RX ORDER — BENZONATATE 100 MG/1
100 CAPSULE ORAL 3 TIMES DAILY PRN
Qty: 21 CAPSULE | Refills: 0 | Status: SHIPPED | OUTPATIENT
Start: 2025-07-29 | End: 2025-08-05

## 2025-07-29 RX ORDER — HEPARIN 100 UNIT/ML
5 SYRINGE INTRAVENOUS ONCE
Status: COMPLETED | OUTPATIENT
Start: 2025-07-29 | End: 2025-07-29

## 2025-07-29 RX ORDER — ONDANSETRON HYDROCHLORIDE 2 MG/ML
4 INJECTION, SOLUTION INTRAVENOUS ONCE
Status: COMPLETED | OUTPATIENT
Start: 2025-07-29 | End: 2025-07-29

## 2025-07-29 RX ORDER — GUAIFENESIN 600 MG/1
600 TABLET, EXTENDED RELEASE ORAL 2 TIMES DAILY
Qty: 14 TABLET | Refills: 0 | Status: SHIPPED | OUTPATIENT
Start: 2025-07-29 | End: 2025-08-05

## 2025-07-29 RX ORDER — KETOROLAC TROMETHAMINE 30 MG/ML
15 INJECTION, SOLUTION INTRAMUSCULAR; INTRAVENOUS ONCE
Status: COMPLETED | OUTPATIENT
Start: 2025-07-29 | End: 2025-07-29

## 2025-07-29 RX ADMIN — KETOROLAC TROMETHAMINE 15 MG: 30 INJECTION, SOLUTION INTRAMUSCULAR at 02:19

## 2025-07-29 RX ADMIN — ONDANSETRON 4 MG: 2 INJECTION, SOLUTION INTRAMUSCULAR; INTRAVENOUS at 02:19

## 2025-07-29 RX ADMIN — Medication 500 UNITS: at 02:49

## 2025-07-29 NOTE — ED PROVIDER NOTES
"    HPI   Chief Complaint   Patient presents with    Fatigue     X1 week     Cough     States 4 days ago she started to have a productive cough    Chills    Shortness of Breath     More with exertion        HPI: Patient is a 49-year-old female with a history of SVT, limited scleroderma, idiopathic intracranial hypertension, CVID on IVIG, persistent asthma, GERD, seizure disorder, hypothyroidism, BRENT on CPAP, migraines, hypertension, type 2 diabetes, and left nonarteritic anterior ischemic optic neuropathy, who is presenting to the emergency department today for concern for shortness of breath and a cough.  Symptoms have been ongoing for about a week associated with fatigue, cough that has become productive, and shortness of breath that is worse with exertion.  She also occasionally getting subjective fevers and chills, nausea, and a mild headache.  No sick contacts, no recent travel, denies any chest pain associated with the symptoms, denies any diarrhea.      ROS: Complete 12 point review of systems performed, otherwise negative except as noted in the history of present illness    PMH: Reviewed, documented below in note. Pertinents in HPI  PSH: Reviewed and documented below in note. Pertinents in HPI  SH: No illicits.  Not homeless.  Fam: Reviewed, noncontributory to patients current complaint  MEDS: Reviewed and documented below in note. Pertinents in HPI  ALLERGIES: Reviewed and documented below in note.            History provided by:  Patient and medical records   used: No                          Jay Coma Scale Score: 15                  Patient History   Medical History[1]  Surgical History[2]  Family History[3]  Social History[4]    Physical Exam   Visit Vitals  /73   Pulse 85   Temp 36.8 °C (98.2 °F) (Temporal)   Resp 20   Ht 1.575 m (5' 2\")   Wt 121 kg (266 lb)   SpO2 97%   BMI 48.65 kg/m²   OB Status Hysterectomy   Smoking Status Never   BSA 2.3 m²      Physical Exam  Vitals " and nursing note reviewed.   Constitutional:       General: She is not in acute distress.     Appearance: Normal appearance. She is well-developed. She is obese.   HENT:      Head: Normocephalic and atraumatic.   Neck:      Vascular: No carotid bruit.     Cardiovascular:      Rate and Rhythm: Normal rate and regular rhythm.      Pulses: Normal pulses.      Heart sounds: Normal heart sounds.   Pulmonary:      Effort: Pulmonary effort is normal.      Breath sounds: Normal breath sounds.   Abdominal:      General: There is no distension.      Palpations: Abdomen is soft.      Tenderness: There is no abdominal tenderness. There is no guarding or rebound.     Musculoskeletal:         General: No tenderness, deformity or signs of injury.      Cervical back: Normal range of motion. No rigidity.      Right lower leg: No tenderness. No edema.      Left lower leg: No tenderness. No edema.     Skin:     General: Skin is warm and dry.      Capillary Refill: Capillary refill takes less than 2 seconds.     Neurological:      General: No focal deficit present.      Mental Status: She is alert and oriented to person, place, and time.      Sensory: No sensory deficit.      Motor: No weakness.     Psychiatric:         Mood and Affect: Mood normal.         Behavior: Behavior normal.         XR chest 2 views   Final Result   No acute cardiopulmonary process.             MACRO:   None.        Signed by: Fabio Mcclelland 7/29/2025 1:45 AM   Dictation workstation:   [id number]          Labs Reviewed   CBC WITH AUTO DIFFERENTIAL - Abnormal       Result Value    WBC 5.3      nRBC 0.0      RBC 4.34      Hemoglobin 12.5      Hematocrit 38.6      MCV 89      MCH 28.8      MCHC 32.4      RDW 14.6 (*)     Platelets 258      Neutrophils % 56.7      Immature Granulocytes %, Automated 0.2      Lymphocytes % 33.4      Monocytes % 7.0      Eosinophils % 2.1      Basophils % 0.6      Neutrophils Absolute 2.99      Immature Granulocytes Absolute,  Automated 0.01      Lymphocytes Absolute 1.76      Monocytes Absolute 0.37      Eosinophils Absolute 0.11      Basophils Absolute 0.03     COMPREHENSIVE METABOLIC PANEL - Abnormal    Glucose 135 (*)     Sodium 138      Potassium 4.2      Chloride 102      Bicarbonate 29      Anion Gap 11      Urea Nitrogen 21      Creatinine 0.98      eGFR 72      Calcium 9.0      Albumin 3.9      Alkaline Phosphatase 84      Total Protein 7.4      AST 28      Bilirubin, Total 0.3      ALT 30     BLOOD GAS VENOUS FULL PANEL - Abnormal    POCT pH, Venous 7.50 (*)     POCT pCO2, Venous 44      POCT pO2, Venous 50 (*)     POCT SO2, Venous 82 (*)     POCT Oxy Hemoglobin, Venous 79.7 (*)     POCT Hematocrit Calculated, Venous 39.0      POCT Sodium, Venous 134 (*)     POCT Potassium, Venous 4.0      POCT Chloride, Venous 106      POCT Ionized Calicum, Venous 1.15      POCT Glucose, Venous 147 (*)     POCT Lactate, Venous 1.1      POCT Base Excess, Venous 9.9 (*)     POCT HCO3 Calculated, Venous 34.3 (*)     POCT Hemoglobin, Venous 13.0      POCT Anion Gap, Venous -2.0 (*)     Patient Temperature 37.0      FiO2 21     MAGNESIUM - Normal    Magnesium 1.94     B-TYPE NATRIURETIC PEPTIDE - Normal    BNP 17      Narrative:        <100 pg/mL - Heart failure unlikely  100-299 pg/mL - Intermediate probability of acute heart                  failure exacerbation. Correlate with clinical                  context and patient history.    >=300 pg/mL - Heart Failure likely. Correlate with clinical                  context and patient history.    BNP testing is performed using different testing methodology at [facility] than at other [facility]. Direct result comparisons should only be made within the same method.      SARS-COV-2, INFLUENZA A/B AND RSV PCR - Normal    Coronavirus 2019, PCR Not Detected      Flu A Result Not Detected      Flu B Result Not Detected      RSV PCR Not Detected      Narrative:     This assay is an  FDA-cleared, in vitro diagnostic nucleic acid amplification test for the qualitative detection and differentiation of SARS CoV-2/ Influenza A/B/ RSV from nasopharyngeal specimens collected from individuals with signs and symptoms of respiratory tract infections, and has been validated for use at [facility]. Negative results do not preclude COVID-19/ Influenza A/B/ RSV infections and should not be used as the sole basis for diagnosis, treatment, or other management decisions. Testing for SARS CoV-2 is recommended only for patients who meet current clinical and/or epidemiological criteria defined by federal, state, or local public health directives.   SERIAL TROPONIN-INITIAL - Normal    Troponin I, High Sensitivity <3      Narrative:     Less than 99th percentile of normal range cutoff-  Female and children under 18 years old <14 ng/L; Male <21 ng/L: Negative  Repeat testing should be performed if clinically indicated.     Female and children under 18 years old 14-50 ng/L; Male 21-50 ng/L:  Consistent with possible cardiac damage and possible increased clinical   risk. Serial measurements may help to assess extent of myocardial damage.     >50 ng/L: Consistent with cardiac damage, increased clinical risk and  myocardial infarction. Serial measurements may help assess extent of   myocardial damage.      NOTE: Children less than 1 year old may have higher baseline troponin   levels and results should be interpreted in conjunction with the overall   clinical context.     NOTE: Troponin I testing is performed using a different   testing methodology at [facility] than at other   [facility]. Direct result comparisons should only   be made within the same method.   SERIAL TROPONIN, 1 HOUR - Normal    Troponin I, High Sensitivity <3      Narrative:     Less than 99th percentile of normal range cutoff-  Female and children under 18 years old <14 ng/L; Male <21 ng/L: Negative  Repeat  testing should be performed if clinically indicated.     Female and children under 18 years old 14-50 ng/L; Male 21-50 ng/L:  Consistent with possible cardiac damage and possible increased clinical   risk. Serial measurements may help to assess extent of myocardial damage.     >50 ng/L: Consistent with cardiac damage, increased clinical risk and  myocardial infarction. Serial measurements may help assess extent of   myocardial damage.      NOTE: Children less than 1 year old may have higher baseline troponin   levels and results should be interpreted in conjunction with the overall   clinical context.     NOTE: Troponin I testing is performed using a different   testing methodology at [facility] than at other   [facility]. Direct result comparisons should only   be made within the same method.   TROPONIN SERIES- (INITIAL, 1 HR)    Narrative:     The following orders were created for panel order Troponin I Series, High Sensitivity (0, 1 HR).  Procedure                               Abnormality         Status                     ---------                               -----------         ------                     Troponin I, High Sensiti...[[id number]]  Normal              Final result               Troponin, High Sensitivi...[[id number]]  Normal              Final result                 Please view results for these tests on the individual orders.         ED Course & MDM   Diagnoses as of 07/29/25 0242   Acute bronchitis, unspecified organism           Medical Decision Making  All mentioned lab results, ECGs, and imaging were independently reviewed by myself  - Patient evaluated. Patient is presenting to the emergency department for shortness of breath, cough, and URI-like symptoms.  Viral versus bacterial infection are considered highest on the differential.  With the shortness of breath worse with exertion, I also want to rule out cardiac pathology as a potential cause.  Labs and imaging were  ordered.  Patient received Toradol and Zofran for her symptoms.  Labs are reassuring with normal cardiac enzymes, normal white count, normal lactate, normal viral swabs, chest x-ray without evidence of pneumonia.  I spoke with the patient about her results.  While antibiotics were considered I do not believe are clinically indicated.  Will discharge the patient on supportive care therapies and have her follow-up with her primary care physician.  Strict return precautions were given and discussed.    - Monitored for any changes in stability or symptomatology. Patient remained stable.   - Counseled regarding labs, imaging, diagnosis, and plan. Patient was agreeable. All questions were answered. The patient was receptive and agreeable to the plan of care.   -The patient was instructed to return to the emergency department if any symptoms recurred, worsened, or if there were any additional concerns.    *Disclaimer: This note was dictated by speech recognition. Minor errors in transcription may be present. Please call with questions.    Vamsi Gonzales MD             Your medication list        START taking these medications        Instructions Last Dose Given Next Dose Due   benzonatate 100 mg capsule  Commonly known as: Tessalon      Take 1 capsule (100 mg) by mouth 3 times a day as needed for cough for up to 7 days. Do not crush or chew.       guaiFENesin 600 mg 12 hr tablet  Commonly known as: Mucinex      Take 1 tablet (600 mg) by mouth 2 times a day for 7 days. Do not crush, chew, or split.              CONTINUE taking these medications        Instructions Last Dose Given Next Dose Due   Blood glucose monitoring meter      Use to test blood sugars daily       Dexcom G7  misc  Generic drug: blood-glucose,,cont           Dexcom G7 Sensor device  Generic drug: blood-glucose sensor      Use to check blood sugar continuously throughout the day as directed. Change sensor every 10 days.       gloves,  "latex with aloe vera misc      Large size gloves       lancets 33 gauge misc  Commonly known as: OneTouch Delica Plus Lancet      Use to check blood sugar 4 times per day in case of CGM failure       lancets misc      Use to test 4 times daily       pen needle, diabetic 32 gauge x 5/32\" needle  Commonly known as: BD Lela 2nd Gen Pen Needle      Use as directed with insulin pen up to 5 times daily              ASK your doctor about these medications        Instructions Last Dose Given Next Dose Due   acetaminophen 325 mg tablet  Commonly known as: Tylenol           Advair -21 mcg/actuation inhaler  Generic drug: fluticasone propion-salmeteroL           amitriptyline 100 mg tablet  Commonly known as: Elavil      TAKE ONE TABLET BY MOUTH ONCE DAILY AT BEDTIME       azelastine 137 mcg (0.1 %) nasal spray  Commonly known as: Astelin      Administer 1 spray into each nostril 2 times a day. Use in each nostril as directed       Baqsimi 3 mg/actuation spray,non-aerosol  Generic drug: glucagon      USE ONE SPRAY IN THE NOSE AS NEEDED FOR LOW BLOOD SUGAR  MAY REPEAT AFTER 15 MINUTES USING A NEW DEVICE IF NO RESPONSE       glucagon 1 mg injection  Commonly known as: Glucagen      Inject 1 mg under the skin 1 time if needed for low blood sugar - see comments (hypoglycemia).       Brovana 15 mcg/2 mL nebulizer solution  Generic drug: arformoterol           budesonide 0.5 mg/2 mL nebulizer solution  Commonly known as: Pulmicort           carboxymethylcellulose 1 % ophthalmic solution dropperette  Commonly known as: Refresh Celluvisc           cholecalciferol 125 mcg (5,000 units) tablet  Commonly known as: Vitamin D-3           divalproex 500 mg 24 hr tablet  Commonly known as: Depakote ER           EPINEPHrine 0.3 mg/0.3 mL injection syringe  Commonly known as: Epipen      INJECT INTRAMUSCULARLY ONCE AS NEEDED FOR ANAPHYLAXIS       ezetimibe 10 mg tablet  Commonly known as: Zetia      Take 1 tablet once daily     "   fluconazole 150 mg tablet  Commonly known as: Diflucan      1 tablet by mouth daily spread 72 hours apart       fluticasone 50 mcg/actuation nasal spray  Commonly known as: Flonase      Administer 2 sprays into each nostril once daily. Shake gently. Before first use, prime pump. After use, clean tip and replace cap.       folic acid 1 mg tablet  Commonly known as: Folvite      Take 1 tablet (1 mg) by mouth once daily.       furosemide 20 mg tablet  Commonly known as: Lasix      Take 1 tablet (20 mg) by mouth 2 times daily (morning and late afternoon).       hydrocortisone 10 mg tablet  Commonly known as: Cortef      Take 1 tablet (10 mg) by mouth 2 times a day. Double the dose if sick for three days       immune globulin (human) infusion  Commonly known as: Gammagard           insulin lispro 100 unit/mL pen  Commonly known as: HumaLOG KwikPen Insulin      Use as directed to give up to 100 units a day       KlonoPIN 0.5 mg tablet  Generic drug: clonazePAM           lacosamide 150 mg tablet tablet  Commonly known as: Vimpat      Take 2 tablets (300 mg) by mouth 2 times a day.       levETIRAcetam 750 mg tablet  Commonly known as: Keppra      Take 2 tablets (1,500 mg) by mouth 2 times a day.       levothyroxine 50 mcg tablet  Commonly known as: Synthroid, Levoxyl      Take 1.5 tabs daily on empty stomach at same time each morning 30-60 mins prior to breakfast       losartan 25 mg tablet  Commonly known as: Cozaar      Take 1 tablet (25 mg) by mouth once daily.       losartan 25 mg tablet  Commonly known as: Cozaar      Take 1 tablet (25 mg) by mouth 2 times a day.       meloxicam 15 mg tablet  Commonly known as: Mobic           miconazole 2 % powder  Commonly known as: Micotin           miconazole 2 % cream  Commonly known as: Micotin           moisturizing mouth solution  Commonly known as: Biotene Oral Dry Mouth           montelukast 10 mg tablet  Commonly known as: Singulair      TAKE ONE TABLET BY MOUTH EVERY DAY  AT BEDTIME       Motegrity 2 mg tablet  Generic drug: prucalopride           ondansetron ODT 8 mg disintegrating tablet  Commonly known as: Zofran-ODT      Dissolve 1 tablet (8 mg) in the mouth every 8 hours if needed for nausea or vomiting.       OneTouch Verio test strips  Generic drug: blood sugar diagnostic      Use to check blood sugar 4 times per day in case of CGM failure       blood sugar diagnostic      Use to test blood sugars 4 times daily       polyethylene glycol 17 gram/dose powder  Commonly known as: Glycolax, Miralax           propranolol LA 60 mg 24 hr capsule  Commonly known as: Inderal LA      Take 1 capsule (60 mg) by mouth once daily. Do not crush, chew, or split.       Protonix 40 mg EC tablet  Generic drug: pantoprazole           rOPINIRole 0.5 mg tablet  Commonly known as: Requip           senna 8.6 mg tablet  Generic drug: sennosides           tiZANidine 2 mg tablet  Commonly known as: Zanaflex      take 1 tablet by mouth twice daily       Toujeo Max U-300 SoloStar 300 unit/mL (3 mL) pen  Generic drug: insulin glargine      Inject 54 units under the skin every morning       Ubrelvy 100 mg tablet  Generic drug: ubrogepant      Take 1 tablet (100 mg) by mouth if needed (migraine). May repeat in 2 hours for max of 200mg per 24 hours.       Viactiv 650 mg-12.5 mcg-40 mcg chewable tablet  Generic drug: calcium-vitamin D3-vitamin K           ZINC ORAL                     Where to Get Your Medications        These medications were sent to [id number] - [place], [place] - [address] [facility]  [address] [place] [address]      Hours: 24-hours Phone: [phone]   benzonatate 100 mg capsule  guaiFENesin 600 mg 12 hr tablet         Procedure  Procedures     *This report was transcribed using voice recognition software.  Every effort was made to ensure accuracy; however, inadvertent computerized transcription errors may be present.*  Tulio  MD Christian  07/29/25           [1]   Past Medical History:  Diagnosis Date    Abnormal findings on diagnostic imaging of other abdominal regions, including retroperitoneum 10/14/2020    Abnormal CT of the abdomen    Acquired deformity of nose 03/24/2022    Nasal deformity    Acute upper respiratory infection, unspecified 10/16/2019    Acute URI    Adrenal disease (Multi)     Allergic     Allergy status to unspecified drugs, medicaments and biological substances 05/22/2020    History of drug allergy    Allergy status to unspecified drugs, medicaments and biological substances 11/13/2020    History of adverse drug reaction    Anemia     Anxiety 2005    Asthma     Benign intracranial hypertension 01/27/2022    Pseudotumor cerebri    Bipolar disorder, unspecified (Multi)     Bipolar disease, chronic    Breast calcification, right 08/21/2018    Cellulitis of abdominal wall 09/28/2022    Cellulitis of right abdominal wall    Cervicalgia 07/01/2020    Cervicalgia of apiywsdg-lqfrqzb-toglx region    Chronic maxillary sinusitis 01/04/2022    Chronic maxillary sinusitis    Chronic sialoadenitis 03/16/2020    Chronic sialoadenitis    COVID-19 01/06/2022    COVID-19 with multiple comorbidities    Decreased white blood cell count, unspecified 11/04/2019    Leukopenia    Disease of thyroid gland     Disturbances of salivary secretion 03/16/2020    Xerostomia    Dry eye syndrome of bilateral lacrimal glands 10/07/2022    Dry eyes, bilateral    Encounter for preprocedural cardiovascular examination 02/01/2022    Preoperative cardiovascular examination    Food additives allergy status 06/11/2020    Allergy to food dye    Fracture of nasal bones, initial encounter for closed fracture 03/03/2022    Closed fracture of nasal bone, initial encounter    GERD (gastroesophageal reflux disease) 15 years old    Granuloma of right orbit 10/07/2021    Inflammatory pseudotumor of right orbit    History of endometrial ablation 11/09/2017     Hyperlipidemia     Hypertension     Hyperthyroidism     Hypoglycemia     Hypothyroidism     Immunocompromised     Localized swelling, mass and lump, head 03/24/2022    Swollen nose    Major depressive disorder, recurrent, in full remission 10/07/2021    Depression, major, recurrent, in complete remission    Mammary duct ectasia of left breast 08/24/2022    Periductal mastitis of left breast    Meningitis ([facility] 2008    Migraine     Nipple discharge 08/24/2022    Bloody discharge from left nipple    Ocular pain, right eye 10/07/2022    Pain in right eye    Optic atrophy     Other abnormal and inconclusive findings on diagnostic imaging of breast 07/06/2020    Other abnormal and inconclusive findings on diagnostic imaging of breast    Other anomalies of pupillary function 05/31/2019    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    Other chest pain 05/18/2020    Chest discomfort    Other conditions influencing health status 08/01/2022    History of cough    Other conditions influencing health status 08/03/2021    Chronic migraine    Other specified disorders of eustachian tube, left ear 11/18/2019    ETD (Eustachian tube dysfunction), left    Other specified disorders of nose and nasal sinuses 03/24/2022    Nasal dryness    Other specified disorders of nose and nasal sinuses 03/24/2022    Nasal crusting    Pelvic and perineal pain 07/06/2020    Pelvic pain    Personal history of other diseases of the circulatory system 04/07/2020    History of sinus tachycardia    Personal history of other diseases of the circulatory system 04/07/2020    History of abnormal electrocardiography    Personal history of other diseases of the circulatory system     History of Raynaud's syndrome    Personal history of other diseases of the digestive system     History of irritable bowel syndrome    Personal history of other diseases of the digestive system 03/02/2020    History of oral pain    Personal history of other diseases of  the musculoskeletal system and connective tissue 01/19/2022    History of neck pain    Personal history of other diseases of the musculoskeletal system and connective tissue 03/02/2021    History of scleroderma    Personal history of other diseases of the musculoskeletal system and connective tissue 06/16/2020    History of muscle weakness    Personal history of other diseases of the nervous system and sense organs 11/18/2019    History of hearing loss    Personal history of other diseases of the nervous system and sense organs 09/21/2022    History of partial seizures    Personal history of other diseases of the respiratory system 04/14/2021    History of asthma    Personal history of other endocrine, nutritional and metabolic disease 02/17/2021    History of diabetes mellitus    Personal history of other mental and behavioral disorders 05/27/2021    History of anxiety    Personal history of other specified conditions 09/07/2022    History of nipple discharge    Personal history of other specified conditions 10/16/2019    History of headache    Personal history of other specified conditions 09/28/2022    History of lump of left breast    Personal history of other specified conditions 09/16/2021    History of persistent cough    Personal history of other specified conditions 02/01/2022    History of palpitations    Personal history of other specified conditions 03/09/2022    History of headache    Personal history of other specified conditions 02/12/2014    History of chest pain    Personal history of other specified conditions 10/27/2021    History of nausea and vomiting    Personal history of other specified conditions 10/16/2019    History of fatigue    Personal history of other specified conditions 02/26/2021    History of orthopnea    Personal history of other specified conditions 02/22/2021    History of shortness of breath    Personal history of urinary calculi     H/O renal calculi    Polycystic ovary  syndrome     Postural orthostatic tachycardia syndrome (POTS)     POTS (postural orthostatic tachycardia syndrome)    Rash and other nonspecific skin eruption 03/15/2022    Rash    Repeated falls 06/23/2021    Recurrent falls    Right lower quadrant pain 10/14/2020    Abdominal pain, RLQ (right lower quadrant)    Seizures (Multi)     Sjogren syndrome, unspecified (Multi)     History of Sjogren's disease    Sjogren's syndrome     Sleep apnea     Slow transit constipation 07/09/2020    Slow transit constipation    Subarachnoid hemorrhage, traumatic (Multi) 04/19/2023    Thyroid nodule     Traumatic subarachnoid hemorrhage with loss of consciousness of unspecified duration, subsequent encounter 03/15/2022    Subarachnoid hemorrhage following injury, with loss of consciousness, subsequent encounter    Traumatic subarachnoid hemorrhage without loss of consciousness, subsequent encounter     Subarachnoid hemorrhage following injury, no loss of consciousness, subsequent encounter    Type 2 diabetes mellitus     Unspecified disorder of refraction 10/07/2022    Refractive error    Unspecified optic neuritis 11/06/2020    Right optic neuritis    Unspecified optic neuritis 11/06/2020    Optic neuritis, right    Unspecified visual loss 09/25/2019    Vision loss    Varicella As a child    Venous insufficiency (chronic) (peripheral) 10/18/2021    Chronic venous insufficiency of lower extremity    Viral infection, unspecified 01/11/2022    Nonspecific syndrome suggestive of viral illness    Vitamin D deficiency     Vitamin D deficiency, unspecified 09/28/2022    Vitamin D deficiency   [2]   Past Surgical History:  Procedure Laterality Date    APPENDECTOMY  2017    BRAIN SURGERY  [place] placement to measure pressure    CARDIAC CATHETERIZATION      CHOLECYSTECTOMY      ENDOMETRIAL ABLATION      FRACTURE SURGERY  12/2023    HERNIA REPAIR Right 03/01/2024    with mesh    HYSTERECTOMY  2017    MR HEAD ANGIO WO IV CONTRAST  02/08/2021     MR HEAD ANGIO WO IV CONTRAST 2/8/2021 [facility]    MR NECK ANGIO WO IV CONTRAST  02/08/2021    MR NECK ANGIO WO IV CONTRAST 2/8/2021 [facility]GAC[facility]    OTHER SURGICAL HISTORY  08/22/2019    Carpal tunnel surgery    OTHER SURGICAL HISTORY  08/22/2019    Hysterectomy    OTHER SURGICAL HISTORY  08/22/2019    Venous access port placement    OTHER SURGICAL HISTORY  08/22/2019    Cholecystectomy    OTHER SURGICAL HISTORY  08/22/2019    Appendectomy    OTHER SURGICAL HISTORY  08/22/2019    Pyloroplasty    WISDOM TOOTH EXTRACTION  2004   [3]   Family History  Problem Relation Name Age of Onset    Other (Perforated bowel) Mother Radha     Hypertension Mother Radha     Macular degeneration Mother Radha     Depression Mother Radha     Hyperlipidemia Mother Radha     Mental illness Mother Radha     Hyperlipidemia Father Mac     Hypertension Father Mac     Heart attack Father Mac     Other (S/P CABG) Father Mac     Diabetes Father Mac     Prostate cancer Father Mac     Coronary artery disease Father Mac     Heart failure Father Mac     Stroke Father Mac     Cancer Father Mac     Macular degeneration Father Mac     Retinal detachment Father Mac     Asthma Father Mac     Hearing loss Father Mac     Heart disease Father Mac     Hernia Father Mac     Stroke Sister Jazmin     Breast cancer Sister Jazmin     Lupus Sister Jazmin     Ovarian cancer Sister Jazmin     Astigmatism Sister Jazmin     Arthritis Sister Jazmin     Asthma Sister Jazmin     Depression Sister Jazmin     Mental illness Sister Jazmin     Miscarriages / Stillbirths Sister Jazmin     Vision loss Sister Jazmin     Hyperlipidemia Brother Sanchez     Hypertension Brother Sanchez     Astigmatism Brother Sanchez     Arthritis Brother Sanchez     Asthma Brother Sanchez     Diabetes Father's Sister Linda     Blindness Father's Sister Linda     Vision loss Father's Sister Linda     Thyroid cancer Father's Sister Bekah     Thyroid disease Father's Sister Jessica      Colon cancer Father's Brother ?     Cancer Father's Brother Kian     Anesthesia related problems Father's Brother Kian     Depression Father's Brother Kian     Hernia Father's Brother Kian     Mental illness Father's Brother Kian     Cancer Maternal Grandmother Brenda     Ovarian cancer Maternal Grandmother Brenda     Blindness Other Multiple     Melanoma Other      Asthma Other      Other (hay fever) Other      Allergies Other      Alcohol abuse Paternal Grandfather Elkin     Arthritis Sister Isha     Asthma Sister Isha     Cancer Sister Isha     Depression Sister Isha     Mental illness Sister Isha     Miscarriages / Stillbirths Sister Isha     Ovarian cancer Sister Isha     Glaucoma Neg Hx      Alcohol abuse Paternal Grandfather Elkin     Arthritis Sister Isha     Asthma Sister Isha     Cancer Sister Isha     Depression Sister Isha     Mental illness Sister Isha     Miscarriages / Stillbirths Sister Isha     Ovarian cancer Sister Isha     Osteoporosis Mother Radha     Alzheimer's disease Mother Radha     Neuropathy Father Mac     Dementia Father Mac     Osteoporosis Father Mac     Alzheimer's disease Father Mac     Migraines Sister Jazmin     Osteoporosis Sister Jazmin     Memory loss Sister Jazmin     Thyroid disease Father's Sister Linda     Thyroid cancer Father's Sister Linda     Anxiety disorder Father's Brother Kian     Depression Sister Isha     Cancer Sister Isha     Ovarian cancer Sister Isha     Asthma Sister Isha     Arthritis Sister Isha     Mental illness Sister Isha     Miscarriages / Stillbirths Sister Isha     Migraines Sister Suzy(half sister)     Depression Sister Suzy(half sister)     Migraines Sister Suzy(half sister)     Depression Sister Isha     Cancer Sister Isha     Ovarian cancer Sister Isha     Asthma Sister Isha     Arthritis Sister Isha     Mental illness Sister Isha     Miscarriages / Stillbirths Sister Isha     Migraines Sister Suzy(half sister)      Depression Sister Suzy(half sister)     Breast cancer Sister Suzy(half sister)     Stroke Sister Suzy(half sister)     Asthma Sister Suzy(half sister)     Migraines Sister Suzy(half sister)     Arthritis Sister Suzy(half sister)     Mental illness Sister Suzy(half sister)     Miscarriages / Stillbirths Sister Suzy(half sister)     Vision loss Sister Suzy(half sister)     GI problems Sister Suzy(half sister)     Depression Sister Isha     Cancer Sister Isha     Ovarian cancer Sister Isha     Asthma Sister Isha     Arthritis Sister Isha     Mental illness Sister Isha     Miscarriages / Stillbirths Sister Isha     GI problems Sister Isha    [4]   Social History  Tobacco Use    Smoking status: Never    Smokeless tobacco: Never   Vaping Use    Vaping status: Never Used   Substance Use Topics    Alcohol use: Not Currently     Comment: Socially in College    Drug use: Yes     Types: Benzodiazepines        Tulio Gonzales MD  07/29/25 [id number]

## 2025-08-05 ENCOUNTER — SPECIALTY PHARMACY (OUTPATIENT)
Dept: PHARMACY | Facility: CLINIC | Age: 50
End: 2025-08-05

## 2025-08-05 PROCEDURE — RXMED WILLOW AMBULATORY MEDICATION CHARGE

## 2025-08-07 ENCOUNTER — APPOINTMENT (OUTPATIENT)
Dept: ENDOCRINOLOGY | Facility: CLINIC | Age: 50
End: 2025-08-07
Payer: MEDICAID

## 2025-08-08 ENCOUNTER — PHARMACY VISIT (OUTPATIENT)
Dept: PHARMACY | Facility: CLINIC | Age: 50
End: 2025-08-08
Payer: MEDICAID

## 2025-08-10 ENCOUNTER — PATIENT MESSAGE (OUTPATIENT)
Dept: ENDOCRINOLOGY | Facility: CLINIC | Age: 50
End: 2025-08-10
Payer: MEDICAID

## 2025-08-12 ENCOUNTER — PATIENT OUTREACH (OUTPATIENT)
Dept: PRIMARY CARE | Facility: CLINIC | Age: 50
End: 2025-08-12
Payer: MEDICAID

## 2025-08-12 DIAGNOSIS — E11.43 TYPE 2 DIABETES MELLITUS WITH DIABETIC AUTONOMIC NEUROPATHY, WITH LONG-TERM CURRENT USE OF INSULIN: ICD-10-CM

## 2025-08-12 DIAGNOSIS — I10 BENIGN ESSENTIAL HYPERTENSION: ICD-10-CM

## 2025-08-12 DIAGNOSIS — G40.909 SEIZURE DISORDER (MULTI): ICD-10-CM

## 2025-08-12 DIAGNOSIS — E66.813 CLASS 3 SEVERE OBESITY DUE TO EXCESS CALORIES WITH SERIOUS COMORBIDITY AND BODY MASS INDEX (BMI) OF 45.0 TO 49.9 IN ADULT: ICD-10-CM

## 2025-08-12 DIAGNOSIS — Z79.4 TYPE 2 DIABETES MELLITUS WITH DIABETIC AUTONOMIC NEUROPATHY, WITH LONG-TERM CURRENT USE OF INSULIN: ICD-10-CM

## 2025-08-12 SDOH — HEALTH STABILITY: PHYSICAL HEALTH: ON AVERAGE, HOW MANY DAYS PER WEEK DO YOU ENGAGE IN MODERATE TO STRENUOUS EXERCISE (LIKE A BRISK WALK)?: 0 DAYS

## 2025-08-12 SDOH — HEALTH STABILITY: PHYSICAL HEALTH: ON AVERAGE, HOW MANY MINUTES DO YOU ENGAGE IN EXERCISE AT THIS LEVEL?: 0 MIN

## 2025-08-18 ENCOUNTER — HOSPITAL ENCOUNTER (EMERGENCY)
Facility: HOSPITAL | Age: 50
Discharge: HOME | End: 2025-08-19
Attending: EMERGENCY MEDICINE
Payer: MEDICAID

## 2025-08-18 DIAGNOSIS — R51.9 ACUTE NONINTRACTABLE HEADACHE, UNSPECIFIED HEADACHE TYPE: Primary | ICD-10-CM

## 2025-08-18 PROCEDURE — 99285 EMERGENCY DEPT VISIT HI MDM: CPT | Performed by: EMERGENCY MEDICINE

## 2025-08-18 RX ORDER — KETOROLAC TROMETHAMINE 30 MG/ML
15 INJECTION, SOLUTION INTRAMUSCULAR; INTRAVENOUS ONCE
Status: COMPLETED | OUTPATIENT
Start: 2025-08-18 | End: 2025-08-19

## 2025-08-18 RX ORDER — ONDANSETRON HYDROCHLORIDE 2 MG/ML
4 INJECTION, SOLUTION INTRAVENOUS ONCE
Status: COMPLETED | OUTPATIENT
Start: 2025-08-18 | End: 2025-08-19

## 2025-08-18 ASSESSMENT — PAIN - FUNCTIONAL ASSESSMENT: PAIN_FUNCTIONAL_ASSESSMENT: 0-10

## 2025-08-18 ASSESSMENT — PAIN SCALES - GENERAL: PAINLEVEL_OUTOF10: 7

## 2025-08-19 ENCOUNTER — APPOINTMENT (OUTPATIENT)
Dept: RADIOLOGY | Facility: HOSPITAL | Age: 50
End: 2025-08-19
Payer: MEDICAID

## 2025-08-19 VITALS
RESPIRATION RATE: 27 BRPM | OXYGEN SATURATION: 96 % | DIASTOLIC BLOOD PRESSURE: 69 MMHG | BODY MASS INDEX: 47.66 KG/M2 | WEIGHT: 259 LBS | HEART RATE: 80 BPM | SYSTOLIC BLOOD PRESSURE: 122 MMHG | HEIGHT: 62 IN | TEMPERATURE: 97.8 F

## 2025-08-19 LAB
ALBUMIN SERPL BCP-MCNC: 3.8 G/DL (ref 3.4–5)
ALBUMIN SERPL-MCNC: 4 G/DL (ref 3.6–5.1)
ALBUMIN/CREAT UR: 2 MG/G CREAT
ALP SERPL-CCNC: 75 U/L (ref 33–110)
ALP SERPL-CCNC: 78 U/L (ref 31–125)
ALT SERPL W P-5'-P-CCNC: 26 U/L (ref 7–45)
ALT SERPL-CCNC: 28 U/L (ref 6–29)
ANION GAP SERPL CALC-SCNC: 9 MMOL/L (ref 10–20)
ANION GAP SERPL CALCULATED.4IONS-SCNC: 7 MMOL/L (CALC) (ref 7–17)
AST SERPL W P-5'-P-CCNC: 17 U/L (ref 9–39)
AST SERPL-CCNC: 14 U/L (ref 10–35)
BASOPHILS # BLD AUTO: 0.03 X10*3/UL (ref 0–0.1)
BASOPHILS NFR BLD AUTO: 0.6 %
BILIRUB SERPL-MCNC: 0.3 MG/DL (ref 0.2–1.2)
BILIRUB SERPL-MCNC: 0.3 MG/DL (ref 0–1.2)
BUN SERPL-MCNC: 16 MG/DL (ref 6–23)
BUN SERPL-MCNC: 20 MG/DL (ref 7–25)
CALCIUM SERPL-MCNC: 9 MG/DL (ref 8.6–10.3)
CALCIUM SERPL-MCNC: 9.1 MG/DL (ref 8.6–10.2)
CHLORIDE SERPL-SCNC: 101 MMOL/L (ref 98–107)
CHLORIDE SERPL-SCNC: 101 MMOL/L (ref 98–110)
CHOLEST SERPL-MCNC: 166 MG/DL
CHOLEST/HDLC SERPL: 3.4 (CALC)
CO2 SERPL-SCNC: 31 MMOL/L (ref 21–32)
CO2 SERPL-SCNC: 33 MMOL/L (ref 20–32)
CREAT SERPL-MCNC: 0.98 MG/DL (ref 0.5–0.99)
CREAT SERPL-MCNC: 1.04 MG/DL (ref 0.5–1.05)
CREAT UR-MCNC: 93 MG/DL (ref 20–275)
EGFRCR SERPLBLD CKD-EPI 2021: 67 ML/MIN/1.73M*2
EGFRCR SERPLBLD CKD-EPI 2021: 72 ML/MIN/1.73M2
EOSINOPHIL # BLD AUTO: 0.06 X10*3/UL (ref 0–0.7)
EOSINOPHIL NFR BLD AUTO: 1.3 %
ERYTHROCYTE [DISTWIDTH] IN BLOOD BY AUTOMATED COUNT: 14.7 % (ref 11.5–14.5)
EST. AVERAGE GLUCOSE BLD GHB EST-MCNC: 174 MG/DL
EST. AVERAGE GLUCOSE BLD GHB EST-SCNC: 9.7 MMOL/L
GLUCOSE SERPL-MCNC: 135 MG/DL (ref 65–99)
GLUCOSE SERPL-MCNC: 174 MG/DL (ref 74–99)
HBA1C MFR BLD: 7.7 %
HCT VFR BLD AUTO: 35.8 % (ref 36–46)
HDLC SERPL-MCNC: 49 MG/DL
HGB BLD-MCNC: 11.6 G/DL (ref 12–16)
IMM GRANULOCYTES # BLD AUTO: 0.01 X10*3/UL (ref 0–0.7)
IMM GRANULOCYTES NFR BLD AUTO: 0.2 % (ref 0–0.9)
LDLC SERPL CALC-MCNC: 86 MG/DL (CALC)
LYMPHOCYTES # BLD AUTO: 1.63 X10*3/UL (ref 1.2–4.8)
LYMPHOCYTES NFR BLD AUTO: 34 %
MCH RBC QN AUTO: 29.4 PG (ref 26–34)
MCHC RBC AUTO-ENTMCNC: 32.4 G/DL (ref 32–36)
MCV RBC AUTO: 91 FL (ref 80–100)
MICROALBUMIN UR-MCNC: 0.2 MG/DL
MONOCYTES # BLD AUTO: 0.34 X10*3/UL (ref 0.1–1)
MONOCYTES NFR BLD AUTO: 7.1 %
NEUTROPHILS # BLD AUTO: 2.73 X10*3/UL (ref 1.2–7.7)
NEUTROPHILS NFR BLD AUTO: 56.8 %
NONHDLC SERPL-MCNC: 117 MG/DL (CALC)
NRBC BLD-RTO: 0 /100 WBCS (ref 0–0)
PLATELET # BLD AUTO: 233 X10*3/UL (ref 150–450)
POTASSIUM SERPL-SCNC: 4 MMOL/L (ref 3.5–5.3)
POTASSIUM SERPL-SCNC: 4.3 MMOL/L (ref 3.5–5.3)
PROT SERPL-MCNC: 7.5 G/DL (ref 6.4–8.2)
PROT SERPL-MCNC: 7.8 G/DL (ref 6.1–8.1)
RBC # BLD AUTO: 3.95 X10*6/UL (ref 4–5.2)
SODIUM SERPL-SCNC: 137 MMOL/L (ref 136–145)
SODIUM SERPL-SCNC: 141 MMOL/L (ref 135–146)
TRIGL SERPL-MCNC: 216 MG/DL
TSH SERPL-ACNC: 3.45 MIU/L
WBC # BLD AUTO: 4.8 X10*3/UL (ref 4.4–11.3)

## 2025-08-19 PROCEDURE — 70250 X-RAY EXAM OF SKULL: CPT

## 2025-08-19 PROCEDURE — 85025 COMPLETE CBC W/AUTO DIFF WBC: CPT | Performed by: EMERGENCY MEDICINE

## 2025-08-19 PROCEDURE — 2500000004 HC RX 250 GENERAL PHARMACY W/ HCPCS (ALT 636 FOR OP/ED): Mod: JZ | Performed by: EMERGENCY MEDICINE

## 2025-08-19 PROCEDURE — 70360 X-RAY EXAM OF NECK: CPT | Performed by: RADIOLOGY

## 2025-08-19 PROCEDURE — 96374 THER/PROPH/DIAG INJ IV PUSH: CPT

## 2025-08-19 PROCEDURE — 74022 RADEX COMPL AQT ABD SERIES: CPT | Performed by: RADIOLOGY

## 2025-08-19 PROCEDURE — 70250 X-RAY EXAM OF SKULL: CPT | Performed by: RADIOLOGY

## 2025-08-19 PROCEDURE — 2500000004 HC RX 250 GENERAL PHARMACY W/ HCPCS (ALT 636 FOR OP/ED): Mod: JZ

## 2025-08-19 PROCEDURE — 80053 COMPREHEN METABOLIC PANEL: CPT | Performed by: EMERGENCY MEDICINE

## 2025-08-19 PROCEDURE — 96375 TX/PRO/DX INJ NEW DRUG ADDON: CPT

## 2025-08-19 PROCEDURE — 70450 CT HEAD/BRAIN W/O DYE: CPT | Performed by: RADIOLOGY

## 2025-08-19 PROCEDURE — 70450 CT HEAD/BRAIN W/O DYE: CPT

## 2025-08-19 PROCEDURE — 96361 HYDRATE IV INFUSION ADD-ON: CPT

## 2025-08-19 RX ORDER — HEPARIN 100 UNIT/ML
5 SYRINGE INTRAVENOUS ONCE
Status: COMPLETED | OUTPATIENT
Start: 2025-08-19 | End: 2025-08-19

## 2025-08-19 RX ORDER — HEPARIN 100 UNIT/ML
SYRINGE INTRAVENOUS
Status: COMPLETED
Start: 2025-08-19 | End: 2025-08-19

## 2025-08-19 RX ADMIN — KETOROLAC TROMETHAMINE 15 MG: 30 INJECTION, SOLUTION INTRAMUSCULAR at 03:07

## 2025-08-19 RX ADMIN — ONDANSETRON 4 MG: 2 INJECTION INTRAMUSCULAR; INTRAVENOUS at 00:14

## 2025-08-19 RX ADMIN — HEPARIN 500 UNITS: 100 SYRINGE at 03:13

## 2025-08-19 RX ADMIN — SODIUM CHLORIDE, SODIUM LACTATE, POTASSIUM CHLORIDE, AND CALCIUM CHLORIDE 500 ML: .6; .31; .03; .02 INJECTION, SOLUTION INTRAVENOUS at 00:20

## 2025-08-20 ENCOUNTER — APPOINTMENT (OUTPATIENT)
Dept: PRIMARY CARE | Facility: CLINIC | Age: 50
End: 2025-08-20
Payer: MEDICAID

## 2025-08-20 VITALS
HEIGHT: 62 IN | HEART RATE: 91 BPM | WEIGHT: 260 LBS | SYSTOLIC BLOOD PRESSURE: 122 MMHG | DIASTOLIC BLOOD PRESSURE: 77 MMHG | BODY MASS INDEX: 47.84 KG/M2

## 2025-08-20 DIAGNOSIS — E66.813 CLASS 3 SEVERE OBESITY DUE TO EXCESS CALORIES WITH SERIOUS COMORBIDITY AND BODY MASS INDEX (BMI) OF 45.0 TO 49.9 IN ADULT: ICD-10-CM

## 2025-08-20 DIAGNOSIS — E27.40 ADRENAL INSUFFICIENCY (MULTI): ICD-10-CM

## 2025-08-20 DIAGNOSIS — I10 BENIGN ESSENTIAL HYPERTENSION: ICD-10-CM

## 2025-08-20 DIAGNOSIS — G93.2 IDIOPATHIC INTRACRANIAL HYPERTENSION: ICD-10-CM

## 2025-08-20 DIAGNOSIS — D83.9 CVID (COMMON VARIABLE IMMUNODEFICIENCY): ICD-10-CM

## 2025-08-20 DIAGNOSIS — J45.50 SEVERE PERSISTENT ASTHMA WITHOUT COMPLICATION (MULTI): ICD-10-CM

## 2025-08-20 DIAGNOSIS — E11.43 TYPE 2 DIABETES MELLITUS WITH DIABETIC AUTONOMIC NEUROPATHY, WITH LONG-TERM CURRENT USE OF INSULIN: Primary | ICD-10-CM

## 2025-08-20 DIAGNOSIS — G47.33 OSA ON CPAP: ICD-10-CM

## 2025-08-20 DIAGNOSIS — E78.5 DYSLIPIDEMIA, GOAL LDL BELOW 100: ICD-10-CM

## 2025-08-20 DIAGNOSIS — E06.3 HYPOTHYROIDISM DUE TO HASHIMOTO THYROIDITIS: ICD-10-CM

## 2025-08-20 DIAGNOSIS — Z79.4 TYPE 2 DIABETES MELLITUS WITH DIABETIC AUTONOMIC NEUROPATHY, WITH LONG-TERM CURRENT USE OF INSULIN: Primary | ICD-10-CM

## 2025-08-20 DIAGNOSIS — G43.109 COMPLICATED MIGRAINE: ICD-10-CM

## 2025-08-20 PROCEDURE — 3078F DIAST BP <80 MM HG: CPT | Performed by: INTERNAL MEDICINE

## 2025-08-20 PROCEDURE — 3074F SYST BP LT 130 MM HG: CPT | Performed by: INTERNAL MEDICINE

## 2025-08-20 PROCEDURE — 4010F ACE/ARB THERAPY RXD/TAKEN: CPT | Performed by: INTERNAL MEDICINE

## 2025-08-20 PROCEDURE — 1036F TOBACCO NON-USER: CPT | Performed by: INTERNAL MEDICINE

## 2025-08-20 PROCEDURE — 3008F BODY MASS INDEX DOCD: CPT | Performed by: INTERNAL MEDICINE

## 2025-08-20 PROCEDURE — 99215 OFFICE O/P EST HI 40 MIN: CPT | Performed by: INTERNAL MEDICINE

## 2025-08-22 ENCOUNTER — CLINICAL SUPPORT (OUTPATIENT)
Dept: NUTRITION | Facility: HOSPITAL | Age: 50
End: 2025-08-22
Payer: MEDICAID

## 2025-08-22 ENCOUNTER — OFFICE VISIT (OUTPATIENT)
Dept: OPHTHALMOLOGY | Facility: CLINIC | Age: 50
End: 2025-08-22
Payer: MEDICAID

## 2025-08-22 DIAGNOSIS — H47.10 EDEMA OF OPTIC DISC OF RIGHT EYE: ICD-10-CM

## 2025-08-22 DIAGNOSIS — G93.2 IIH (IDIOPATHIC INTRACRANIAL HYPERTENSION): Primary | ICD-10-CM

## 2025-08-22 DIAGNOSIS — H47.013 NAION (NON-ARTERITIC ANTERIOR ISCHEMIC OPTIC NEUROPATHY), BILATERAL: ICD-10-CM

## 2025-08-22 DIAGNOSIS — H47.20 OPTIC ATROPHY: ICD-10-CM

## 2025-08-22 DIAGNOSIS — H47.012 NAION (NON-ARTERITIC ANTERIOR ISCHEMIC OPTIC NEUROPATHY), LEFT EYE: ICD-10-CM

## 2025-08-22 DIAGNOSIS — H47.013 NAION (NON-ARTERITIC ANTERIOR ISCHEMIC OPTIC NEUROPATHY), BOTH EYES: ICD-10-CM

## 2025-08-22 PROCEDURE — 92083 EXTENDED VISUAL FIELD XM: CPT | Performed by: PSYCHIATRY & NEUROLOGY

## 2025-08-22 PROCEDURE — 92133 CPTRZD OPH DX IMG PST SGM ON: CPT | Performed by: PSYCHIATRY & NEUROLOGY

## 2025-08-22 PROCEDURE — 99214 OFFICE O/P EST MOD 30 MIN: CPT | Performed by: PSYCHIATRY & NEUROLOGY

## 2025-08-22 ASSESSMENT — CUP TO DISC RATIO
OS_RATIO: 0.15
OD_RATIO: 0.15

## 2025-08-22 ASSESSMENT — EXTERNAL EXAM - RIGHT EYE: OD_EXAM: NORMAL

## 2025-08-22 ASSESSMENT — ENCOUNTER SYMPTOMS
CARDIOVASCULAR NEGATIVE: 0
CONSTITUTIONAL NEGATIVE: 0
RESPIRATORY NEGATIVE: 0
GASTROINTESTINAL NEGATIVE: 0
NEUROLOGICAL NEGATIVE: 1
MUSCULOSKELETAL NEGATIVE: 0
HEMATOLOGIC/LYMPHATIC NEGATIVE: 0
ALLERGIC/IMMUNOLOGIC NEGATIVE: 0
EYES NEGATIVE: 1
ENDOCRINE NEGATIVE: 0
PSYCHIATRIC NEGATIVE: 0

## 2025-08-22 ASSESSMENT — VISUAL ACUITY
OD_CC: 20/400
OS_CC: 20/HM
OS_CC: 20/800
METHOD: SNELLEN - LINEAR
OD_CC: 20/CF@1FT
OS_SC: 20/HM

## 2025-08-22 ASSESSMENT — CONF VISUAL FIELD
OD_INFERIOR_NASAL_RESTRICTION: 1
OD_INFERIOR_TEMPORAL_RESTRICTION: 1
OS_INFERIOR_NASAL_RESTRICTION: 1
OS_SUPERIOR_TEMPORAL_RESTRICTION: 3
OS_SUPERIOR_NASAL_RESTRICTION: 1
OD_SUPERIOR_TEMPORAL_RESTRICTION: 3
OD_SUPERIOR_NASAL_RESTRICTION: 1
OS_INFERIOR_TEMPORAL_RESTRICTION: 1

## 2025-08-22 ASSESSMENT — REFRACTION_WEARINGRX
OD_SPHERE: -1.50
OS_CYLINDER: -3.00
OS_AXIS: 170
OS_SPHERE: -1.25
OD_AXIS: 015
SPECS_TYPE: SVL
OD_CYLINDER: -3.00

## 2025-08-22 ASSESSMENT — SLIT LAMP EXAM - LIDS
COMMENTS: NORMAL
COMMENTS: NORMAL

## 2025-08-22 ASSESSMENT — TONOMETRY
OS_IOP_MMHG: 23
IOP_METHOD: TONOPEN
OD_IOP_MMHG: 21

## 2025-08-22 ASSESSMENT — EXTERNAL EXAM - LEFT EYE: OS_EXAM: NORMAL

## 2025-08-25 ENCOUNTER — PATIENT MESSAGE (OUTPATIENT)
Dept: OPHTHALMOLOGY | Facility: CLINIC | Age: 50
End: 2025-08-25
Payer: MEDICAID

## 2025-08-25 DIAGNOSIS — H47.20 OPTIC ATROPHY: ICD-10-CM

## 2025-08-25 DIAGNOSIS — G93.2 IDIOPATHIC INTRACRANIAL HYPERTENSION: Primary | ICD-10-CM

## 2025-08-26 RX ORDER — LORAZEPAM 1 MG/1
1 TABLET ORAL
Qty: 2 TABLET | Refills: 0 | Status: SHIPPED | OUTPATIENT
Start: 2025-08-26 | End: 2025-08-26

## 2025-08-28 LAB
IGA SERPL-MCNC: 110 MG/DL (ref 47–310)
IGG SERPL-MCNC: 1803 MG/DL (ref 600–1640)
IGM SERPL-MCNC: 74 MG/DL (ref 50–300)

## 2025-08-30 LAB
ACE SERPL-CCNC: 33 U/L (ref 9–67)
COPPER BLD-MCNC: NORMAL UG/DL
CREAT SERPL-MCNC: 0.97 MG/DL (ref 0.5–0.99)
EGFRCR SERPLBLD CKD-EPI 2021: 73 ML/MIN/1.73M2
FOLATE SERPL-MCNC: >24 NG/ML
IGNF NEG CNTRL BLD: NORMAL
M TB IFN-G BLD-IMP: NEGATIVE
MITOGEN IGNF.SPOT COUNT BLD: NORMAL
QUEST PANEL A SPOT COUNT: 0
QUEST PANEL B SPOT COUNT: 0
T PALLIDUM AB SER QL IA: NORMAL
VIT A SERPL-MCNC: 77 MCG/DL (ref 38–98)
VIT B1 BLD-SCNC: NORMAL NMOL/L
VIT B12 SERPL-MCNC: 529 PG/ML (ref 200–1100)

## 2025-08-31 ENCOUNTER — HOSPITAL ENCOUNTER (EMERGENCY)
Facility: HOSPITAL | Age: 50
Discharge: HOME | End: 2025-09-01
Attending: STUDENT IN AN ORGANIZED HEALTH CARE EDUCATION/TRAINING PROGRAM
Payer: MEDICAID

## 2025-08-31 ENCOUNTER — APPOINTMENT (OUTPATIENT)
Dept: RADIOLOGY | Facility: HOSPITAL | Age: 50
End: 2025-08-31
Payer: MEDICAID

## 2025-08-31 DIAGNOSIS — H54.61 VISION LOSS OF RIGHT EYE: Primary | ICD-10-CM

## 2025-08-31 PROCEDURE — 99285 EMERGENCY DEPT VISIT HI MDM: CPT | Performed by: STUDENT IN AN ORGANIZED HEALTH CARE EDUCATION/TRAINING PROGRAM

## 2025-08-31 PROCEDURE — 99285 EMERGENCY DEPT VISIT HI MDM: CPT | Mod: 25 | Performed by: STUDENT IN AN ORGANIZED HEALTH CARE EDUCATION/TRAINING PROGRAM

## 2025-08-31 PROCEDURE — 70250 X-RAY EXAM OF SKULL: CPT

## 2025-08-31 RX ORDER — ONDANSETRON 4 MG/1
4 TABLET, FILM COATED ORAL ONCE
Status: COMPLETED | OUTPATIENT
Start: 2025-08-31 | End: 2025-09-01

## 2025-08-31 ASSESSMENT — LIFESTYLE VARIABLES
EVER HAD A DRINK FIRST THING IN THE MORNING TO STEADY YOUR NERVES TO GET RID OF A HANGOVER: NO
HAVE YOU EVER FELT YOU SHOULD CUT DOWN ON YOUR DRINKING: NO
TOTAL SCORE: 0
EVER FELT BAD OR GUILTY ABOUT YOUR DRINKING: NO
HAVE PEOPLE ANNOYED YOU BY CRITICIZING YOUR DRINKING: NO

## 2025-08-31 ASSESSMENT — PAIN - FUNCTIONAL ASSESSMENT: PAIN_FUNCTIONAL_ASSESSMENT: 0-10

## 2025-08-31 ASSESSMENT — PAIN DESCRIPTION - PAIN TYPE: TYPE: ACUTE PAIN

## 2025-08-31 ASSESSMENT — PAIN SCALES - GENERAL: PAINLEVEL_OUTOF10: 6

## 2025-09-01 ENCOUNTER — APPOINTMENT (OUTPATIENT)
Dept: RADIOLOGY | Facility: HOSPITAL | Age: 50
End: 2025-09-01
Payer: MEDICAID

## 2025-09-01 VITALS
HEIGHT: 62 IN | BODY MASS INDEX: 47.84 KG/M2 | RESPIRATION RATE: 16 BRPM | SYSTOLIC BLOOD PRESSURE: 110 MMHG | TEMPERATURE: 98.6 F | DIASTOLIC BLOOD PRESSURE: 54 MMHG | WEIGHT: 260 LBS | HEART RATE: 84 BPM | OXYGEN SATURATION: 99 %

## 2025-09-01 LAB
ABO GROUP (TYPE) IN BLOOD: NORMAL
ANION GAP SERPL CALC-SCNC: 12 MMOL/L (ref 10–20)
ANTIBODY SCREEN: NORMAL
BASOPHILS # BLD AUTO: 0.03 X10*3/UL (ref 0–0.1)
BASOPHILS NFR BLD AUTO: 0.6 %
BUN SERPL-MCNC: 18 MG/DL (ref 6–23)
CALCIUM SERPL-MCNC: 9.1 MG/DL (ref 8.6–10.6)
CHLORIDE SERPL-SCNC: 100 MMOL/L (ref 98–107)
CO2 SERPL-SCNC: 30 MMOL/L (ref 21–32)
CREAT SERPL-MCNC: 0.9 MG/DL (ref 0.5–1.05)
CRP SERPL-MCNC: 0.67 MG/DL
EGFRCR SERPLBLD CKD-EPI 2021: 80 ML/MIN/1.73M*2
EOSINOPHIL # BLD AUTO: 0.04 X10*3/UL (ref 0–0.7)
EOSINOPHIL NFR BLD AUTO: 0.8 %
ERYTHROCYTE [DISTWIDTH] IN BLOOD BY AUTOMATED COUNT: 14.5 % (ref 11.5–14.5)
ERYTHROCYTE [SEDIMENTATION RATE] IN BLOOD BY WESTERGREN METHOD: 31 MM/H (ref 0–20)
GLUCOSE SERPL-MCNC: 193 MG/DL (ref 74–99)
HCT VFR BLD AUTO: 35.7 % (ref 36–46)
HGB BLD-MCNC: 11.7 G/DL (ref 12–16)
IMM GRANULOCYTES # BLD AUTO: 0.02 X10*3/UL (ref 0–0.7)
IMM GRANULOCYTES NFR BLD AUTO: 0.4 % (ref 0–0.9)
INR PPP: 1 (ref 0.9–1.1)
LYMPHOCYTES # BLD AUTO: 1.56 X10*3/UL (ref 1.2–4.8)
LYMPHOCYTES NFR BLD AUTO: 30.1 %
MCH RBC QN AUTO: 29.3 PG (ref 26–34)
MCHC RBC AUTO-ENTMCNC: 32.8 G/DL (ref 32–36)
MCV RBC AUTO: 90 FL (ref 80–100)
MONOCYTES # BLD AUTO: 0.4 X10*3/UL (ref 0.1–1)
MONOCYTES NFR BLD AUTO: 7.7 %
NEUTROPHILS # BLD AUTO: 3.14 X10*3/UL (ref 1.2–7.7)
NEUTROPHILS NFR BLD AUTO: 60.4 %
NRBC BLD-RTO: 0 /100 WBCS (ref 0–0)
PLATELET # BLD AUTO: 252 X10*3/UL (ref 150–450)
POTASSIUM SERPL-SCNC: 3.8 MMOL/L (ref 3.5–5.3)
PROTHROMBIN TIME: 10.8 SECONDS (ref 9.8–12.4)
RBC # BLD AUTO: 3.99 X10*6/UL (ref 4–5.2)
RH FACTOR (ANTIGEN D): NORMAL
SODIUM SERPL-SCNC: 138 MMOL/L (ref 136–145)
WBC # BLD AUTO: 5.2 X10*3/UL (ref 4.4–11.3)

## 2025-09-01 PROCEDURE — 70450 CT HEAD/BRAIN W/O DYE: CPT | Performed by: SURGERY

## 2025-09-01 PROCEDURE — 71045 X-RAY EXAM CHEST 1 VIEW: CPT | Performed by: SURGERY

## 2025-09-01 PROCEDURE — 2550000001 HC RX 255 CONTRASTS: Mod: JZ,SE | Performed by: STUDENT IN AN ORGANIZED HEALTH CARE EDUCATION/TRAINING PROGRAM

## 2025-09-01 PROCEDURE — 74018 RADEX ABDOMEN 1 VIEW: CPT | Performed by: SURGERY

## 2025-09-01 PROCEDURE — 85025 COMPLETE CBC W/AUTO DIFF WBC: CPT | Performed by: STUDENT IN AN ORGANIZED HEALTH CARE EDUCATION/TRAINING PROGRAM

## 2025-09-01 PROCEDURE — 70450 CT HEAD/BRAIN W/O DYE: CPT

## 2025-09-01 PROCEDURE — 2500000004 HC RX 250 GENERAL PHARMACY W/ HCPCS (ALT 636 FOR OP/ED): Mod: SE

## 2025-09-01 PROCEDURE — 70250 X-RAY EXAM OF SKULL: CPT | Performed by: SURGERY

## 2025-09-01 PROCEDURE — 85610 PROTHROMBIN TIME: CPT | Performed by: STUDENT IN AN ORGANIZED HEALTH CARE EDUCATION/TRAINING PROGRAM

## 2025-09-01 PROCEDURE — A9575 INJ GADOTERATE MEGLUMI 0.1ML: HCPCS | Mod: JZ,SE | Performed by: STUDENT IN AN ORGANIZED HEALTH CARE EDUCATION/TRAINING PROGRAM

## 2025-09-01 PROCEDURE — 96374 THER/PROPH/DIAG INJ IV PUSH: CPT | Mod: 59

## 2025-09-01 PROCEDURE — 2500000004 HC RX 250 GENERAL PHARMACY W/ HCPCS (ALT 636 FOR OP/ED): Mod: JZ,SE

## 2025-09-01 PROCEDURE — 86850 RBC ANTIBODY SCREEN: CPT | Performed by: STUDENT IN AN ORGANIZED HEALTH CARE EDUCATION/TRAINING PROGRAM

## 2025-09-01 PROCEDURE — 70543 MRI ORBT/FAC/NCK W/O &W/DYE: CPT

## 2025-09-01 PROCEDURE — 86140 C-REACTIVE PROTEIN: CPT

## 2025-09-01 PROCEDURE — 80048 BASIC METABOLIC PNL TOTAL CA: CPT | Performed by: STUDENT IN AN ORGANIZED HEALTH CARE EDUCATION/TRAINING PROGRAM

## 2025-09-01 PROCEDURE — 70553 MRI BRAIN STEM W/O & W/DYE: CPT

## 2025-09-01 PROCEDURE — 70543 MRI ORBT/FAC/NCK W/O &W/DYE: CPT | Performed by: RADIOLOGY

## 2025-09-01 PROCEDURE — 70553 MRI BRAIN STEM W/O & W/DYE: CPT | Performed by: RADIOLOGY

## 2025-09-01 PROCEDURE — 2500000001 HC RX 250 WO HCPCS SELF ADMINISTERED DRUGS (ALT 637 FOR MEDICARE OP): Mod: SE

## 2025-09-01 PROCEDURE — 85652 RBC SED RATE AUTOMATED: CPT

## 2025-09-01 RX ORDER — GADOTERATE MEGLUMINE 376.9 MG/ML
20 INJECTION INTRAVENOUS
Status: COMPLETED | OUTPATIENT
Start: 2025-09-01 | End: 2025-09-01

## 2025-09-01 RX ORDER — HEPARIN 100 UNIT/ML
5 SYRINGE INTRAVENOUS ONCE
Status: COMPLETED | OUTPATIENT
Start: 2025-09-01 | End: 2025-09-01

## 2025-09-01 RX ORDER — LORAZEPAM 2 MG/ML
2 INJECTION INTRAMUSCULAR ONCE
Status: COMPLETED | OUTPATIENT
Start: 2025-09-01 | End: 2025-09-01

## 2025-09-01 RX ORDER — OXYCODONE HYDROCHLORIDE 5 MG/1
5 TABLET ORAL ONCE
Refills: 0 | Status: COMPLETED | OUTPATIENT
Start: 2025-09-01 | End: 2025-09-01

## 2025-09-01 RX ADMIN — ONDANSETRON HYDROCHLORIDE 4 MG: 4 TABLET, FILM COATED ORAL at 00:23

## 2025-09-01 RX ADMIN — GADOTERATE MEGLUMINE 20 ML: 376.9 INJECTION INTRAVENOUS at 04:52

## 2025-09-01 RX ADMIN — HEPARIN 500 UNITS: 100 SYRINGE at 09:17

## 2025-09-01 RX ADMIN — LORAZEPAM 2 MG: 2 INJECTION INTRAMUSCULAR; INTRAVENOUS at 03:51

## 2025-09-01 RX ADMIN — OXYCODONE HYDROCHLORIDE 5 MG: 5 TABLET ORAL at 02:32

## 2025-09-01 ASSESSMENT — PAIN SCALES - GENERAL
PAINLEVEL_OUTOF10: 0 - NO PAIN
PAINLEVEL_OUTOF10: 0 - NO PAIN
PAINLEVEL_OUTOF10: 6

## 2025-09-01 ASSESSMENT — PAIN DESCRIPTION - LOCATION: LOCATION: HEAD

## 2025-09-01 ASSESSMENT — PAIN - FUNCTIONAL ASSESSMENT
PAIN_FUNCTIONAL_ASSESSMENT: 0-10
PAIN_FUNCTIONAL_ASSESSMENT: 0-10

## 2025-09-02 ENCOUNTER — APPOINTMENT (OUTPATIENT)
Dept: ENDOCRINOLOGY | Facility: CLINIC | Age: 50
End: 2025-09-02
Payer: MEDICAID

## 2025-09-02 ENCOUNTER — SPECIALTY PHARMACY (OUTPATIENT)
Dept: PHARMACY | Facility: CLINIC | Age: 50
End: 2025-09-02

## 2025-09-02 ENCOUNTER — PHARMACY VISIT (OUTPATIENT)
Dept: PHARMACY | Facility: CLINIC | Age: 50
End: 2025-09-02
Payer: MEDICAID

## 2025-09-02 ENCOUNTER — APPOINTMENT (OUTPATIENT)
Dept: RADIOLOGY | Facility: HOSPITAL | Age: 50
End: 2025-09-02
Payer: MEDICAID

## 2025-09-02 PROCEDURE — RXMED WILLOW AMBULATORY MEDICATION CHARGE

## 2025-09-03 ENCOUNTER — APPOINTMENT (OUTPATIENT)
Dept: RADIOLOGY | Facility: HOSPITAL | Age: 50
End: 2025-09-03
Payer: MEDICAID

## 2025-09-03 ENCOUNTER — PHARMACY VISIT (OUTPATIENT)
Dept: PHARMACY | Facility: CLINIC | Age: 50
End: 2025-09-03

## 2025-09-03 ENCOUNTER — HOSPITAL ENCOUNTER (OUTPATIENT)
Facility: HOSPITAL | Age: 50
Setting detail: OBSERVATION
Discharge: HOME | End: 2025-09-05
Attending: EMERGENCY MEDICINE | Admitting: STUDENT IN AN ORGANIZED HEALTH CARE EDUCATION/TRAINING PROGRAM
Payer: MEDICAID

## 2025-09-03 DIAGNOSIS — R51.9 NONINTRACTABLE HEADACHE, UNSPECIFIED CHRONICITY PATTERN, UNSPECIFIED HEADACHE TYPE: Primary | ICD-10-CM

## 2025-09-03 DIAGNOSIS — R04.0 EPISTAXIS: ICD-10-CM

## 2025-09-03 LAB
ABO GROUP (TYPE) IN BLOOD: NORMAL
ALBUMIN SERPL BCP-MCNC: 4 G/DL (ref 3.4–5)
ALP SERPL-CCNC: 102 U/L (ref 33–110)
ALT SERPL W P-5'-P-CCNC: 63 U/L (ref 7–45)
ANION GAP SERPL CALC-SCNC: 12 MMOL/L (ref 10–20)
ANTIBODY SCREEN: NORMAL
APPEARANCE UR: CLEAR
APTT PPP: 30 SECONDS (ref 26–36)
AST SERPL W P-5'-P-CCNC: 43 U/L (ref 9–39)
BASOPHILS # BLD AUTO: 0.04 X10*3/UL (ref 0–0.1)
BASOPHILS NFR BLD AUTO: 0.7 %
BILIRUB SERPL-MCNC: 0.3 MG/DL (ref 0–1.2)
BILIRUB UR STRIP.AUTO-MCNC: NEGATIVE MG/DL
BUN SERPL-MCNC: 16 MG/DL (ref 6–23)
CALCIUM SERPL-MCNC: 9.6 MG/DL (ref 8.6–10.6)
CHLORIDE SERPL-SCNC: 100 MMOL/L (ref 98–107)
CO2 SERPL-SCNC: 32 MMOL/L (ref 21–32)
COLOR UR: NORMAL
CREAT SERPL-MCNC: 0.8 MG/DL (ref 0.5–1.05)
CRP SERPL-MCNC: 0.83 MG/DL
EGFRCR SERPLBLD CKD-EPI 2021: >90 ML/MIN/1.73M*2
EOSINOPHIL # BLD AUTO: 0.03 X10*3/UL (ref 0–0.7)
EOSINOPHIL NFR BLD AUTO: 0.5 %
ERYTHROCYTE [DISTWIDTH] IN BLOOD BY AUTOMATED COUNT: 14.6 % (ref 11.5–14.5)
ERYTHROCYTE [SEDIMENTATION RATE] IN BLOOD BY WESTERGREN METHOD: 32 MM/H (ref 0–20)
GLUCOSE SERPL-MCNC: 185 MG/DL (ref 74–99)
GLUCOSE UR STRIP.AUTO-MCNC: NORMAL MG/DL
HCT VFR BLD AUTO: 36.8 % (ref 36–46)
HGB BLD-MCNC: 11.9 G/DL (ref 12–16)
IMM GRANULOCYTES # BLD AUTO: 0.02 X10*3/UL (ref 0–0.7)
IMM GRANULOCYTES NFR BLD AUTO: 0.3 % (ref 0–0.9)
INR PPP: 1 (ref 0.9–1.1)
KETONES UR STRIP.AUTO-MCNC: NEGATIVE MG/DL
LEUKOCYTE ESTERASE UR QL STRIP.AUTO: NEGATIVE
LYMPHOCYTES # BLD AUTO: 1.74 X10*3/UL (ref 1.2–4.8)
LYMPHOCYTES NFR BLD AUTO: 30.4 %
MAGNESIUM SERPL-MCNC: 1.98 MG/DL (ref 1.6–2.4)
MCH RBC QN AUTO: 29 PG (ref 26–34)
MCHC RBC AUTO-ENTMCNC: 32.3 G/DL (ref 32–36)
MCV RBC AUTO: 90 FL (ref 80–100)
MONOCYTES # BLD AUTO: 0.41 X10*3/UL (ref 0.1–1)
MONOCYTES NFR BLD AUTO: 7.2 %
NEUTROPHILS # BLD AUTO: 3.49 X10*3/UL (ref 1.2–7.7)
NEUTROPHILS NFR BLD AUTO: 60.9 %
NITRITE UR QL STRIP.AUTO: NEGATIVE
NRBC BLD-RTO: 0 /100 WBCS (ref 0–0)
PH UR STRIP.AUTO: 8 [PH]
PLATELET # BLD AUTO: 272 X10*3/UL (ref 150–450)
POTASSIUM SERPL-SCNC: 4 MMOL/L (ref 3.5–5.3)
PROT SERPL-MCNC: 7.8 G/DL (ref 6.4–8.2)
PROT UR STRIP.AUTO-MCNC: NEGATIVE MG/DL
PROTHROMBIN TIME: 10.5 SECONDS (ref 9.8–12.4)
RBC # BLD AUTO: 4.1 X10*6/UL (ref 4–5.2)
RBC # UR STRIP.AUTO: NEGATIVE MG/DL
RH FACTOR (ANTIGEN D): NORMAL
SODIUM SERPL-SCNC: 140 MMOL/L (ref 136–145)
SP GR UR STRIP.AUTO: 1.01
UROBILINOGEN UR STRIP.AUTO-MCNC: NORMAL MG/DL
WBC # BLD AUTO: 5.7 X10*3/UL (ref 4.4–11.3)

## 2025-09-03 PROCEDURE — 81003 URINALYSIS AUTO W/O SCOPE: CPT

## 2025-09-03 PROCEDURE — 85652 RBC SED RATE AUTOMATED: CPT

## 2025-09-03 PROCEDURE — 85025 COMPLETE CBC W/AUTO DIFF WBC: CPT | Performed by: EMERGENCY MEDICINE

## 2025-09-03 PROCEDURE — 87075 CULTR BACTERIA EXCEPT BLOOD: CPT

## 2025-09-03 PROCEDURE — 86140 C-REACTIVE PROTEIN: CPT

## 2025-09-03 PROCEDURE — 70450 CT HEAD/BRAIN W/O DYE: CPT

## 2025-09-03 PROCEDURE — 83735 ASSAY OF MAGNESIUM: CPT | Performed by: EMERGENCY MEDICINE

## 2025-09-03 PROCEDURE — 71045 X-RAY EXAM CHEST 1 VIEW: CPT

## 2025-09-03 PROCEDURE — 84702 CHORIONIC GONADOTROPIN TEST: CPT

## 2025-09-03 PROCEDURE — 86900 BLOOD TYPING SEROLOGIC ABO: CPT

## 2025-09-03 PROCEDURE — 74018 RADEX ABDOMEN 1 VIEW: CPT | Performed by: RADIOLOGY

## 2025-09-03 PROCEDURE — 99285 EMERGENCY DEPT VISIT HI MDM: CPT | Mod: 25 | Performed by: EMERGENCY MEDICINE

## 2025-09-03 PROCEDURE — 85610 PROTHROMBIN TIME: CPT

## 2025-09-03 PROCEDURE — 70450 CT HEAD/BRAIN W/O DYE: CPT | Performed by: RADIOLOGY

## 2025-09-03 PROCEDURE — 36415 COLL VENOUS BLD VENIPUNCTURE: CPT

## 2025-09-03 PROCEDURE — 71045 X-RAY EXAM CHEST 1 VIEW: CPT | Performed by: RADIOLOGY

## 2025-09-03 PROCEDURE — 70250 X-RAY EXAM OF SKULL: CPT | Performed by: RADIOLOGY

## 2025-09-03 PROCEDURE — 80053 COMPREHEN METABOLIC PANEL: CPT | Performed by: EMERGENCY MEDICINE

## 2025-09-03 ASSESSMENT — PAIN DESCRIPTION - PAIN TYPE: TYPE: ACUTE PAIN

## 2025-09-03 ASSESSMENT — PAIN SCALES - GENERAL: PAINLEVEL_OUTOF10: 6

## 2025-09-03 ASSESSMENT — PAIN DESCRIPTION - LOCATION: LOCATION: HEAD

## 2025-09-03 ASSESSMENT — PAIN - FUNCTIONAL ASSESSMENT: PAIN_FUNCTIONAL_ASSESSMENT: 0-10

## 2025-09-03 ASSESSMENT — PAIN DESCRIPTION - DESCRIPTORS: DESCRIPTORS: ACHING

## 2025-09-04 ENCOUNTER — APPOINTMENT (OUTPATIENT)
Dept: PHARMACY | Facility: HOSPITAL | Age: 50
End: 2025-09-04
Payer: MEDICAID

## 2025-09-04 PROBLEM — R51.9 HEADACHE: Status: ACTIVE | Noted: 2025-09-04

## 2025-09-04 LAB
ACE SERPL-CCNC: 33 U/L (ref 9–67)
B-HCG SERPL-ACNC: <3 MIU/ML
COPPER BLD-MCNC: 110 MCG/DL
CREAT SERPL-MCNC: 0.97 MG/DL (ref 0.5–0.99)
EGFRCR SERPLBLD CKD-EPI 2021: 73 ML/MIN/1.73M2
FOLATE SERPL-MCNC: >24 NG/ML
GLUCOSE BLD MANUAL STRIP-MCNC: 139 MG/DL (ref 74–99)
GLUCOSE BLD MANUAL STRIP-MCNC: 181 MG/DL (ref 74–99)
GLUCOSE BLD MANUAL STRIP-MCNC: 216 MG/DL (ref 74–99)
HOLD SPECIMEN: NORMAL
IGNF NEG CNTRL BLD: NORMAL
M TB IFN-G BLD-IMP: NEGATIVE
MITOGEN IGNF.SPOT COUNT BLD: NORMAL
QUEST PANEL A SPOT COUNT: 0
QUEST PANEL B SPOT COUNT: 0
T PALLIDUM AB SER QL IA: NEGATIVE
VIT A SERPL-MCNC: 77 MCG/DL (ref 38–98)
VIT B1 BLD-SCNC: 183 NMOL/L (ref 78–185)
VIT B12 SERPL-MCNC: 529 PG/ML (ref 200–1100)

## 2025-09-04 PROCEDURE — 2500000004 HC RX 250 GENERAL PHARMACY W/ HCPCS (ALT 636 FOR OP/ED): Mod: JZ

## 2025-09-04 PROCEDURE — 82947 ASSAY GLUCOSE BLOOD QUANT: CPT

## 2025-09-04 PROCEDURE — G0378 HOSPITAL OBSERVATION PER HR: HCPCS

## 2025-09-04 PROCEDURE — 2500000002 HC RX 250 W HCPCS SELF ADMINISTERED DRUGS (ALT 637 FOR MEDICARE OP, ALT 636 FOR OP/ED): Mod: SE | Performed by: PHYSICIAN ASSISTANT

## 2025-09-04 PROCEDURE — 2500000001 HC RX 250 WO HCPCS SELF ADMINISTERED DRUGS (ALT 637 FOR MEDICARE OP): Mod: SE | Performed by: PHYSICIAN ASSISTANT

## 2025-09-04 PROCEDURE — 2500000004 HC RX 250 GENERAL PHARMACY W/ HCPCS (ALT 636 FOR OP/ED): Mod: JZ,SE | Performed by: PHYSICIAN ASSISTANT

## 2025-09-04 PROCEDURE — 99254 IP/OBS CNSLTJ NEW/EST MOD 60: CPT | Performed by: STUDENT IN AN ORGANIZED HEALTH CARE EDUCATION/TRAINING PROGRAM

## 2025-09-04 RX ORDER — LOSARTAN POTASSIUM 25 MG/1
25 TABLET ORAL 2 TIMES DAILY
Status: DISCONTINUED | OUTPATIENT
Start: 2025-09-04 | End: 2025-09-05 | Stop reason: HOSPADM

## 2025-09-04 RX ORDER — EZETIMIBE 10 MG/1
10 TABLET ORAL NIGHTLY
Status: DISCONTINUED | OUTPATIENT
Start: 2025-09-04 | End: 2025-09-05 | Stop reason: HOSPADM

## 2025-09-04 RX ORDER — PROMETHAZINE HYDROCHLORIDE 12.5 MG/1
12.5 TABLET ORAL EVERY 6 HOURS PRN
COMMUNITY

## 2025-09-04 RX ORDER — FUROSEMIDE 20 MG/1
20 TABLET ORAL
Status: DISCONTINUED | OUTPATIENT
Start: 2025-09-04 | End: 2025-09-05 | Stop reason: HOSPADM

## 2025-09-04 RX ORDER — KETOROLAC TROMETHAMINE 15 MG/ML
15 INJECTION, SOLUTION INTRAMUSCULAR; INTRAVENOUS ONCE
Status: COMPLETED | OUTPATIENT
Start: 2025-09-04 | End: 2025-09-04

## 2025-09-04 RX ORDER — INSULIN GLARGINE 100 [IU]/ML
50 INJECTION, SOLUTION SUBCUTANEOUS EVERY MORNING
Status: DISCONTINUED | OUTPATIENT
Start: 2025-09-05 | End: 2025-09-05 | Stop reason: HOSPADM

## 2025-09-04 RX ORDER — DIPHENHYDRAMINE HYDROCHLORIDE 50 MG/ML
25 INJECTION, SOLUTION INTRAMUSCULAR; INTRAVENOUS ONCE
Status: COMPLETED | OUTPATIENT
Start: 2025-09-04 | End: 2025-09-04

## 2025-09-04 RX ORDER — LEVOTHYROXINE SODIUM 50 UG/1
75 TABLET ORAL DAILY
Status: COMPLETED | OUTPATIENT
Start: 2025-09-05 | End: 2025-09-05

## 2025-09-04 RX ORDER — DEXTROSE 50 % IN WATER (D50W) INTRAVENOUS SYRINGE
12.5
Status: DISCONTINUED | OUTPATIENT
Start: 2025-09-04 | End: 2025-09-05 | Stop reason: HOSPADM

## 2025-09-04 RX ORDER — IPRATROPIUM BROMIDE AND ALBUTEROL SULFATE 2.5; .5 MG/3ML; MG/3ML
3 SOLUTION RESPIRATORY (INHALATION) EVERY 4 HOURS PRN
Status: DISCONTINUED | OUTPATIENT
Start: 2025-09-04 | End: 2025-09-05 | Stop reason: HOSPADM

## 2025-09-04 RX ORDER — DIPHENHYDRAMINE HCL 25 MG
25 CAPSULE ORAL ONCE
Status: DISCONTINUED | OUTPATIENT
Start: 2025-09-04 | End: 2025-09-04

## 2025-09-04 RX ORDER — AMITRIPTYLINE HYDROCHLORIDE 50 MG/1
100 TABLET, FILM COATED ORAL NIGHTLY
Status: DISCONTINUED | OUTPATIENT
Start: 2025-09-04 | End: 2025-09-05 | Stop reason: HOSPADM

## 2025-09-04 RX ORDER — OXYMETAZOLINE HCL 0.05 %
2 SPRAY, NON-AEROSOL (ML) NASAL EVERY 8 HOURS PRN
Status: DISCONTINUED | OUTPATIENT
Start: 2025-09-04 | End: 2025-09-05 | Stop reason: HOSPADM

## 2025-09-04 RX ORDER — INSULIN LISPRO 100 [IU]/ML
0-10 INJECTION, SOLUTION INTRAVENOUS; SUBCUTANEOUS
Status: DISCONTINUED | OUTPATIENT
Start: 2025-09-05 | End: 2025-09-05 | Stop reason: HOSPADM

## 2025-09-04 RX ORDER — ASCORBIC ACID 500 MG
500 TABLET,CHEWABLE ORAL DAILY
COMMUNITY

## 2025-09-04 RX ORDER — PROPRANOLOL HYDROCHLORIDE 60 MG/1
60 CAPSULE, EXTENDED RELEASE ORAL DAILY
Status: DISCONTINUED | OUTPATIENT
Start: 2025-09-04 | End: 2025-09-05 | Stop reason: HOSPADM

## 2025-09-04 RX ORDER — ONDANSETRON HYDROCHLORIDE 2 MG/ML
4 INJECTION, SOLUTION INTRAVENOUS ONCE
Status: COMPLETED | OUTPATIENT
Start: 2025-09-04 | End: 2025-09-04

## 2025-09-04 RX ORDER — MONTELUKAST SODIUM 10 MG/1
10 TABLET ORAL NIGHTLY
Status: DISCONTINUED | OUTPATIENT
Start: 2025-09-04 | End: 2025-09-05 | Stop reason: HOSPADM

## 2025-09-04 RX ORDER — LEVETIRACETAM 500 MG/1
750 TABLET ORAL 2 TIMES DAILY
Status: DISCONTINUED | OUTPATIENT
Start: 2025-09-04 | End: 2025-09-04

## 2025-09-04 RX ORDER — DIVALPROEX SODIUM 250 MG/1
500 TABLET, FILM COATED, EXTENDED RELEASE ORAL 2 TIMES DAILY
Status: DISCONTINUED | OUTPATIENT
Start: 2025-09-04 | End: 2025-09-05 | Stop reason: HOSPADM

## 2025-09-04 RX ORDER — FLUTICASONE PROPIONATE 50 MCG
2 SPRAY, SUSPENSION (ML) NASAL DAILY
Status: DISCONTINUED | OUTPATIENT
Start: 2025-09-04 | End: 2025-09-05 | Stop reason: HOSPADM

## 2025-09-04 RX ORDER — INSULIN LISPRO 100 [IU]/ML
0-10 INJECTION, SOLUTION INTRAVENOUS; SUBCUTANEOUS EVERY 4 HOURS
Status: DISCONTINUED | OUTPATIENT
Start: 2025-09-04 | End: 2025-09-04

## 2025-09-04 RX ORDER — CLONAZEPAM 0.5 MG/1
0.5 TABLET ORAL 2 TIMES DAILY
Status: DISCONTINUED | OUTPATIENT
Start: 2025-09-04 | End: 2025-09-05 | Stop reason: HOSPADM

## 2025-09-04 RX ORDER — DEXTROSE 50 % IN WATER (D50W) INTRAVENOUS SYRINGE
25
Status: DISCONTINUED | OUTPATIENT
Start: 2025-09-04 | End: 2025-09-05 | Stop reason: HOSPADM

## 2025-09-04 RX ORDER — ACETAMINOPHEN 325 MG/1
650 TABLET ORAL 4 TIMES DAILY PRN
Status: DISCONTINUED | OUTPATIENT
Start: 2025-09-04 | End: 2025-09-05 | Stop reason: HOSPADM

## 2025-09-04 RX ORDER — BUDESONIDE 0.5 MG/2ML
0.5 INHALANT ORAL
Status: DISCONTINUED | OUTPATIENT
Start: 2025-09-04 | End: 2025-09-05 | Stop reason: HOSPADM

## 2025-09-04 RX ORDER — AZELASTINE 1 MG/ML
1 SPRAY, METERED NASAL 2 TIMES DAILY
Status: DISCONTINUED | OUTPATIENT
Start: 2025-09-04 | End: 2025-09-05 | Stop reason: HOSPADM

## 2025-09-04 RX ORDER — LEVETIRACETAM 500 MG/1
1500 TABLET ORAL 2 TIMES DAILY
Status: DISCONTINUED | OUTPATIENT
Start: 2025-09-04 | End: 2025-09-05 | Stop reason: HOSPADM

## 2025-09-04 RX ADMIN — ONDANSETRON 4 MG: 2 INJECTION INTRAMUSCULAR; INTRAVENOUS at 02:46

## 2025-09-04 RX ADMIN — FUROSEMIDE 20 MG: 20 TABLET ORAL at 17:03

## 2025-09-04 RX ADMIN — KETOROLAC TROMETHAMINE 15 MG: 15 INJECTION, SOLUTION INTRAMUSCULAR; INTRAVENOUS at 18:13

## 2025-09-04 RX ADMIN — DIPHENHYDRAMINE HYDROCHLORIDE 25 MG: 50 INJECTION INTRAMUSCULAR; INTRAVENOUS at 18:14

## 2025-09-04 RX ADMIN — BUDESONIDE 0.5 MG: 0.5 INHALANT RESPIRATORY (INHALATION) at 14:39

## 2025-09-04 RX ADMIN — KETOROLAC TROMETHAMINE 15 MG: 15 INJECTION, SOLUTION INTRAMUSCULAR; INTRAVENOUS at 08:39

## 2025-09-04 RX ADMIN — ONDANSETRON 4 MG: 2 INJECTION INTRAMUSCULAR; INTRAVENOUS at 18:14

## 2025-09-04 RX ADMIN — FLUTICASONE PROPIONATE 2 SPRAY: 50 SPRAY, METERED NASAL at 14:39

## 2025-09-04 RX ADMIN — CLONAZEPAM 0.5 MG: 0.5 TABLET ORAL at 14:40

## 2025-09-04 RX ADMIN — INSULIN LISPRO 4 UNITS: 100 INJECTION, SOLUTION INTRAVENOUS; SUBCUTANEOUS at 17:03

## 2025-09-04 RX ADMIN — ACETAMINOPHEN 650 MG: 325 TABLET ORAL at 14:39

## 2025-09-04 RX ADMIN — DIVALPROEX SODIUM 500 MG: 250 TABLET, EXTENDED RELEASE ORAL at 14:39

## 2025-09-04 RX ADMIN — KETOROLAC TROMETHAMINE 15 MG: 15 INJECTION, SOLUTION INTRAMUSCULAR; INTRAVENOUS at 02:47

## 2025-09-04 RX ADMIN — ONDANSETRON 4 MG: 2 INJECTION, SOLUTION INTRAMUSCULAR; INTRAVENOUS at 05:58

## 2025-09-04 RX ADMIN — LOSARTAN POTASSIUM 25 MG: 25 TABLET, FILM COATED ORAL at 14:39

## 2025-09-04 ASSESSMENT — PAIN DESCRIPTION - PAIN TYPE: TYPE: ACUTE PAIN

## 2025-09-04 ASSESSMENT — PAIN SCALES - GENERAL
PAINLEVEL_OUTOF10: 3
PAINLEVEL_OUTOF10: 4

## 2025-09-04 ASSESSMENT — PAIN DESCRIPTION - LOCATION: LOCATION: HEAD

## 2025-09-04 ASSESSMENT — PAIN - FUNCTIONAL ASSESSMENT
PAIN_FUNCTIONAL_ASSESSMENT: 0-10
PAIN_FUNCTIONAL_ASSESSMENT: 0-10

## 2025-09-05 VITALS
TEMPERATURE: 97.4 F | HEART RATE: 82 BPM | SYSTOLIC BLOOD PRESSURE: 103 MMHG | DIASTOLIC BLOOD PRESSURE: 68 MMHG | HEIGHT: 62 IN | RESPIRATION RATE: 16 BRPM | OXYGEN SATURATION: 94 % | WEIGHT: 260 LBS | BODY MASS INDEX: 47.84 KG/M2

## 2025-09-05 LAB — GLUCOSE BLD MANUAL STRIP-MCNC: 137 MG/DL (ref 74–99)

## 2025-09-05 PROCEDURE — 82947 ASSAY GLUCOSE BLOOD QUANT: CPT

## 2025-09-05 PROCEDURE — 2500000002 HC RX 250 W HCPCS SELF ADMINISTERED DRUGS (ALT 637 FOR MEDICARE OP, ALT 636 FOR OP/ED): Mod: SE

## 2025-09-05 PROCEDURE — G0378 HOSPITAL OBSERVATION PER HR: HCPCS

## 2025-09-05 PROCEDURE — 2500000001 HC RX 250 WO HCPCS SELF ADMINISTERED DRUGS (ALT 637 FOR MEDICARE OP): Mod: SE

## 2025-09-05 PROCEDURE — 2500000002 HC RX 250 W HCPCS SELF ADMINISTERED DRUGS (ALT 637 FOR MEDICARE OP, ALT 636 FOR OP/ED): Mod: SE | Performed by: PHYSICIAN ASSISTANT

## 2025-09-05 PROCEDURE — 2500000001 HC RX 250 WO HCPCS SELF ADMINISTERED DRUGS (ALT 637 FOR MEDICARE OP): Mod: SE | Performed by: PHYSICIAN ASSISTANT

## 2025-09-05 RX ORDER — OXYMETAZOLINE HCL 0.05 %
2 SPRAY, NON-AEROSOL (ML) NASAL EVERY 8 HOURS PRN
Qty: 30 ML | Refills: 0 | Status: SHIPPED | OUTPATIENT
Start: 2025-09-05 | End: 2025-09-07

## 2025-09-05 RX ORDER — LACOSAMIDE 50 MG/1
300 TABLET ORAL 2 TIMES DAILY
Status: DISCONTINUED | OUTPATIENT
Start: 2025-09-05 | End: 2025-09-05

## 2025-09-05 RX ORDER — LACOSAMIDE 50 MG/1
300 TABLET ORAL 2 TIMES DAILY
Status: COMPLETED | OUTPATIENT
Start: 2025-09-05 | End: 2025-09-05

## 2025-09-05 RX ORDER — PANTOPRAZOLE SODIUM 40 MG/1
40 TABLET, DELAYED RELEASE ORAL
Status: DISCONTINUED | OUTPATIENT
Start: 2025-09-05 | End: 2025-09-05 | Stop reason: HOSPADM

## 2025-09-05 RX ORDER — TIZANIDINE 4 MG/1
2 TABLET ORAL 2 TIMES DAILY
Status: DISCONTINUED | OUTPATIENT
Start: 2025-09-05 | End: 2025-09-05 | Stop reason: HOSPADM

## 2025-09-05 RX ADMIN — LEVETIRACETAM 1500 MG: 500 TABLET, FILM COATED ORAL at 00:03

## 2025-09-05 RX ADMIN — AZELASTINE HYDROCHLORIDE 1 SPRAY: 137 SPRAY, METERED NASAL at 00:02

## 2025-09-05 RX ADMIN — MONTELUKAST 10 MG: 10 TABLET, FILM COATED ORAL at 00:03

## 2025-09-05 RX ADMIN — LOSARTAN POTASSIUM 25 MG: 25 TABLET, FILM COATED ORAL at 00:03

## 2025-09-05 RX ADMIN — CLONAZEPAM 0.5 MG: 0.5 TABLET ORAL at 00:03

## 2025-09-05 RX ADMIN — LEVOTHYROXINE SODIUM 75 MCG: 0.05 TABLET ORAL at 06:41

## 2025-09-05 RX ADMIN — LACOSAMIDE 300 MG: 50 TABLET, FILM COATED ORAL at 02:03

## 2025-09-05 RX ADMIN — DIVALPROEX SODIUM 500 MG: 250 TABLET, EXTENDED RELEASE ORAL at 00:03

## 2025-09-05 RX ADMIN — EZETIMIBE 10 MG: 10 TABLET ORAL at 00:03

## 2025-09-05 RX ADMIN — TIZANIDINE 2 MG: 4 TABLET ORAL at 02:02

## 2025-09-05 RX ADMIN — AMITRIPTYLINE HYDROCHLORIDE 100 MG: 50 TABLET, FILM COATED ORAL at 00:03

## 2025-09-07 LAB
BACTERIA BLD CULT: NORMAL
BACTERIA BLD CULT: NORMAL

## 2025-09-09 ENCOUNTER — APPOINTMENT (OUTPATIENT)
Dept: OPHTHALMOLOGY | Facility: CLINIC | Age: 50
End: 2025-09-09
Payer: MEDICAID

## 2025-09-24 ENCOUNTER — APPOINTMENT (OUTPATIENT)
Dept: NEUROSURGERY | Facility: HOSPITAL | Age: 50
End: 2025-09-24
Payer: MEDICAID

## 2025-10-07 ENCOUNTER — APPOINTMENT (OUTPATIENT)
Dept: OTOLARYNGOLOGY | Facility: CLINIC | Age: 50
End: 2025-10-07
Payer: MEDICAID

## 2025-10-21 ENCOUNTER — APPOINTMENT (OUTPATIENT)
Dept: ENDOCRINOLOGY | Facility: CLINIC | Age: 50
End: 2025-10-21
Payer: MEDICAID

## 2025-12-15 ENCOUNTER — APPOINTMENT (OUTPATIENT)
Dept: OPHTHALMOLOGY | Facility: CLINIC | Age: 50
End: 2025-12-15
Payer: MEDICAID

## 2025-12-16 ENCOUNTER — APPOINTMENT (OUTPATIENT)
Dept: PRIMARY CARE | Facility: CLINIC | Age: 50
End: 2025-12-16
Payer: MEDICAID

## (undated) DEVICE — DRAPE, SHEET, 17 X 23 IN

## (undated) DEVICE — ANTIFOG, SOLUTION, FOG-OUT

## (undated) DEVICE — STYLET, AXIEM STEALTH NAVIGATION

## (undated) DEVICE — Device

## (undated) DEVICE — DRESSING, GAUZE, 16 PLY, 4 X 4 IN, STERILE

## (undated) DEVICE — TUBE SET, PNEUMOCLEAR, SMOKE EVACU, HIGH-FLOW

## (undated) DEVICE — BAG, DECANTER

## (undated) DEVICE — ADHESIVE, SKIN, DERMABOND ADVANCED, 15CM, PEN-STYLE

## (undated) DEVICE — TROCAR, KII OPTICAL BLADELESS 5MM Z THREAD 100MM LNGTH

## (undated) DEVICE — TROCAR, OPTICAL, BLADELESS, 12MM, THREADED, 100MM LENGTH

## (undated) DEVICE — APPLICATOR, PREP, CHLORAPREP, W/ORANGE TINT, 10.5ML

## (undated) DEVICE — GOWN, SURGICAL, SMARTGOWN, XLARGE, STERILE

## (undated) DEVICE — SUTURE, SILK, 0 PERMA HAND, BR, SUTUPAK, 30IN, BLACK

## (undated) DEVICE — BUR, ACORN 6MM PRECISION

## (undated) DEVICE — PREP, SKIN, DURAPREP, 6 CC

## (undated) DEVICE — ADAPTER, LUER STUB, 16 G

## (undated) DEVICE — COVER, TABLE, UHC

## (undated) DEVICE — BUR, PERFORATOR, CRANIAL, ACRA-CUT 14/11MM, CDM

## (undated) DEVICE — MARKER, SKIN, RULER AND LABEL PACK, CUSTOM

## (undated) DEVICE — SUTURE, VICRYL, 0, 27 IN, UR-6, VIOLET

## (undated) DEVICE — DRAPE, INCISE, ANTIMICROBIAL, IOBAN 2, STERI DRAPE, 23 X 33 IN, DISPOSABLE, STERILE

## (undated) DEVICE — INTRODUCER KIT, PERCUTANEOUS, 10 FR

## (undated) DEVICE — PAD, GROUNDING, ELECTROSURGICAL, W/9 FT CABLE, POLYHESIVE II, ADULT, LF

## (undated) DEVICE — PREP, IODOPHOR, W/ALCOHOL, DURAPREP, W/APPLICATOR, 26 CC

## (undated) DEVICE — SPONGE GAUZE, XRAY SC+RFID, 4X4 16 PLY, STERILE

## (undated) DEVICE — SPONGE, LAP, XRAY DECT, SC+RFID, 12X12, STERILE

## (undated) DEVICE — SUTURE, NUROLON, 4-0, 18 IN, TF, CONTROL RELEASE, MULTIPACK, BLACK

## (undated) DEVICE — DRAPE, IRRIGATION, W/POUCH, ADHESIVE STRIP, STERI DRAPE, 19 X 23 IN, DISPOSABLE, STERILE

## (undated) DEVICE — ADHESIVE, SKIN, MASTISOL, 2/3 CC VIAL

## (undated) DEVICE — DRAPE, TIBURON, SPLIT SHEET, REINF ADH STRIP, 77X122

## (undated) DEVICE — TUBING, SUCTION, CONNECTING, STERILE 0.25 X 120 IN., LF

## (undated) DEVICE — MANIFOLD, 4 PORT NEPTUNE STANDARD

## (undated) DEVICE — SPONGE, HEMOSTATIC, CELLULOSE, SURGICEL, 2 X 14 IN

## (undated) DEVICE — BOOT, SUTURE-AID, YELLOW, STERILE, LF

## (undated) DEVICE — DRESSING, MEPILEX, BORDER, SACRUM, 8.7 X 9.8 IN

## (undated) DEVICE — CATHETER, RED RUBBER 14FR

## (undated) DEVICE — SUTURE, VICRYL, 4-0, 27IN, RB-1

## (undated) DEVICE — SOLUTION, INJECTION, USP, SODIUM CHLORIDE 0.9%, .9 NACL, 250 ML, BAG

## (undated) DEVICE — SUTURE, MONOCRYL, 4-0, 18 IN, PS2, UNDYED

## (undated) DEVICE — APPLICATOR, CHLORAPREP, W/ORANGE TINT, 26ML

## (undated) DEVICE — DRAPE PACK, CRANIOTOMY, CUSTOM, UHC

## (undated) DEVICE — CATHETER KIT, NEURO VENTRICULAR, 35 CML

## (undated) DEVICE — DRAINAGE, MONITORING SYSTEM, EXTERNAL CSF

## (undated) DEVICE — DRESSING, ISLAND, TELFA, 4 X 5 IN

## (undated) DEVICE — CATHETER TRAY, SURESTEP, 16FR, URINE METER W/STATLOCK

## (undated) DEVICE — TUBING, SMOKE EVAC, 3/8 X 10 FT

## (undated) DEVICE — CATHETER, URETHRAL, ROBNEL,  8 FR, 16 IN, LF, RED

## (undated) DEVICE — REST, HEAD, BAGEL, 9 IN

## (undated) DEVICE — DRESSING, ADHESIVE, ISLAND, TELFA, 2 X 3.75 IN, LF

## (undated) DEVICE — WOUND SYSTEM, DEBRIDEMENT & CLEANING, O.R DUOPAK

## (undated) DEVICE — SPONGE, HEMOSTATIC, COLLAGEN, AVITENE ULTRAFOAM, 3 -1/8X5X3/8

## (undated) DEVICE — COVER, CART, 45 X 27 X 48 IN, CLEAR

## (undated) DEVICE — DRESSING, GAUZE, PETROLATUM, PATCH, XEROFORM, 1 X 8 IN, STERILE

## (undated) DEVICE — TRACKER, STINGRAY, NON-INVASIVE, AXIEM STEALTH NAVIGATION, NEURO

## (undated) DEVICE — BUR, ACORN 9MM PRECISION

## (undated) DEVICE — SUTURE, SILK, 2-0, 18 IN, BLACK

## (undated) DEVICE — BUR, ROUTER BIT, FA2, TAPERED, 2.3MM

## (undated) DEVICE — ELECTRODE, ELECTROSURGICAL, BLADE, INSULATED, ENT/IMA, STERILE

## (undated) DEVICE — SEALANT, HEMOSTATIC, FLOSEAL, 10 ML

## (undated) DEVICE — SUTURE, PROLENE, 3-0, 30 IN, FSL, BLUE

## (undated) DEVICE — FLOSEAL, MATRIX, HEMOSTATIC, FULL STERILE PREP, 5ML

## (undated) DEVICE — DRESSING, MEPILEX, BORDER, HEEL, 8.7 X 9.1 IN